# Patient Record
Sex: MALE | Race: BLACK OR AFRICAN AMERICAN | HISPANIC OR LATINO | Employment: OTHER | URBAN - METROPOLITAN AREA
[De-identification: names, ages, dates, MRNs, and addresses within clinical notes are randomized per-mention and may not be internally consistent; named-entity substitution may affect disease eponyms.]

---

## 2017-01-01 ENCOUNTER — GENERIC CONVERSION - ENCOUNTER (OUTPATIENT)
Dept: OTHER | Facility: OTHER | Age: 57
End: 2017-01-01

## 2017-01-25 ENCOUNTER — ALLSCRIPTS OFFICE VISIT (OUTPATIENT)
Dept: WOUND CARE | Facility: HOSPITAL | Age: 57
End: 2017-01-25
Payer: MEDICARE

## 2017-01-25 ENCOUNTER — GENERIC CONVERSION - ENCOUNTER (OUTPATIENT)
Dept: OTHER | Facility: OTHER | Age: 57
End: 2017-01-25

## 2017-01-25 PROCEDURE — 97598 DBRDMT OPN WND ADDL 20CM/<: CPT | Performed by: PREVENTIVE MEDICINE

## 2017-01-25 PROCEDURE — 97597 DBRDMT OPN WND 1ST 20 CM/<: CPT | Performed by: PREVENTIVE MEDICINE

## 2017-02-22 ENCOUNTER — ALLSCRIPTS OFFICE VISIT (OUTPATIENT)
Dept: WOUND CARE | Facility: HOSPITAL | Age: 57
End: 2017-02-22
Payer: MEDICARE

## 2017-02-22 PROCEDURE — 97598 DBRDMT OPN WND ADDL 20CM/<: CPT | Performed by: PREVENTIVE MEDICINE

## 2017-02-22 PROCEDURE — 97597 DBRDMT OPN WND 1ST 20 CM/<: CPT | Performed by: PREVENTIVE MEDICINE

## 2017-02-23 ENCOUNTER — ALLSCRIPTS OFFICE VISIT (OUTPATIENT)
Dept: OTHER | Facility: OTHER | Age: 57
End: 2017-02-23

## 2017-02-26 ENCOUNTER — GENERIC CONVERSION - ENCOUNTER (OUTPATIENT)
Dept: OTHER | Facility: OTHER | Age: 57
End: 2017-02-26

## 2017-03-22 ENCOUNTER — ALLSCRIPTS OFFICE VISIT (OUTPATIENT)
Dept: WOUND CARE | Facility: HOSPITAL | Age: 57
End: 2017-03-22
Payer: MEDICARE

## 2017-03-22 PROCEDURE — 99213 OFFICE O/P EST LOW 20 MIN: CPT | Performed by: PREVENTIVE MEDICINE

## 2017-03-22 PROCEDURE — 97598 DBRDMT OPN WND ADDL 20CM/<: CPT | Performed by: PREVENTIVE MEDICINE

## 2017-03-22 PROCEDURE — 97597 DBRDMT OPN WND 1ST 20 CM/<: CPT | Performed by: PREVENTIVE MEDICINE

## 2017-03-23 ENCOUNTER — APPOINTMENT (OUTPATIENT)
Dept: LAB | Facility: CLINIC | Age: 57
End: 2017-03-23
Payer: MEDICARE

## 2017-03-23 ENCOUNTER — TRANSCRIBE ORDERS (OUTPATIENT)
Dept: LAB | Facility: CLINIC | Age: 57
End: 2017-03-23

## 2017-03-23 ENCOUNTER — ALLSCRIPTS OFFICE VISIT (OUTPATIENT)
Dept: OTHER | Facility: OTHER | Age: 57
End: 2017-03-23

## 2017-03-23 DIAGNOSIS — E11.9 TYPE 2 DIABETES MELLITUS WITHOUT COMPLICATIONS (HCC): ICD-10-CM

## 2017-03-23 DIAGNOSIS — E78.5 HYPERLIPIDEMIA: ICD-10-CM

## 2017-03-23 LAB
25(OH)D3 SERPL-MCNC: 30 NG/ML (ref 30–100)
ALBUMIN SERPL BCP-MCNC: 2.6 G/DL (ref 3.5–5)
ALP SERPL-CCNC: 102 U/L (ref 46–116)
ALT SERPL W P-5'-P-CCNC: 13 U/L (ref 12–78)
ANION GAP SERPL CALCULATED.3IONS-SCNC: 6 MMOL/L (ref 4–13)
AST SERPL W P-5'-P-CCNC: 6 U/L (ref 5–45)
BASOPHILS # BLD AUTO: 0.06 THOUSANDS/ΜL (ref 0–0.1)
BASOPHILS NFR BLD AUTO: 1 % (ref 0–1)
BILIRUB SERPL-MCNC: 0.44 MG/DL (ref 0.2–1)
BUN SERPL-MCNC: 13 MG/DL (ref 5–25)
CALCIUM SERPL-MCNC: 9.1 MG/DL (ref 8.3–10.1)
CHLORIDE SERPL-SCNC: 107 MMOL/L (ref 100–108)
CHOLEST SERPL-MCNC: 147 MG/DL (ref 50–200)
CO2 SERPL-SCNC: 27 MMOL/L (ref 21–32)
CREAT SERPL-MCNC: 0.43 MG/DL (ref 0.6–1.3)
EOSINOPHIL # BLD AUTO: 0.35 THOUSAND/ΜL (ref 0–0.61)
EOSINOPHIL NFR BLD AUTO: 4 % (ref 0–6)
ERYTHROCYTE [DISTWIDTH] IN BLOOD BY AUTOMATED COUNT: 19 % (ref 11.6–15.1)
EST. AVERAGE GLUCOSE BLD GHB EST-MCNC: 103 MG/DL
GFR SERPL CREATININE-BSD FRML MDRD: >60 ML/MIN/1.73SQ M
GLUCOSE P FAST SERPL-MCNC: 74 MG/DL (ref 65–99)
HBA1C MFR BLD: 5.2 % (ref 4.2–6.3)
HCT VFR BLD AUTO: 37.8 % (ref 36.5–49.3)
HDLC SERPL-MCNC: 43 MG/DL (ref 40–60)
HGB BLD-MCNC: 11.8 G/DL (ref 12–17)
LDLC SERPL CALC-MCNC: 85 MG/DL (ref 0–100)
LYMPHOCYTES # BLD AUTO: 1.7 THOUSANDS/ΜL (ref 0.6–4.47)
LYMPHOCYTES NFR BLD AUTO: 20 % (ref 14–44)
MCH RBC QN AUTO: 25.8 PG (ref 26.8–34.3)
MCHC RBC AUTO-ENTMCNC: 31.2 G/DL (ref 31.4–37.4)
MCV RBC AUTO: 83 FL (ref 82–98)
MONOCYTES # BLD AUTO: 0.6 THOUSAND/ΜL (ref 0.17–1.22)
MONOCYTES NFR BLD AUTO: 7 % (ref 4–12)
NEUTROPHILS # BLD AUTO: 5.7 THOUSANDS/ΜL (ref 1.85–7.62)
NEUTS SEG NFR BLD AUTO: 68 % (ref 43–75)
NRBC BLD AUTO-RTO: 0 /100 WBCS
PLATELET # BLD AUTO: 413 THOUSANDS/UL (ref 149–390)
PMV BLD AUTO: 10.1 FL (ref 8.9–12.7)
POTASSIUM SERPL-SCNC: 3.8 MMOL/L (ref 3.5–5.3)
PROT SERPL-MCNC: 8.2 G/DL (ref 6.4–8.2)
RBC # BLD AUTO: 4.57 MILLION/UL (ref 3.88–5.62)
SODIUM SERPL-SCNC: 140 MMOL/L (ref 136–145)
TRIGL SERPL-MCNC: 96 MG/DL
TSH SERPL DL<=0.05 MIU/L-ACNC: 0.7 UIU/ML (ref 0.36–3.74)
WBC # BLD AUTO: 8.42 THOUSAND/UL (ref 4.31–10.16)

## 2017-03-23 PROCEDURE — 80061 LIPID PANEL: CPT

## 2017-03-23 PROCEDURE — 85025 COMPLETE CBC W/AUTO DIFF WBC: CPT

## 2017-03-23 PROCEDURE — 82306 VITAMIN D 25 HYDROXY: CPT

## 2017-03-23 PROCEDURE — 36415 COLL VENOUS BLD VENIPUNCTURE: CPT

## 2017-03-23 PROCEDURE — 83036 HEMOGLOBIN GLYCOSYLATED A1C: CPT

## 2017-03-23 PROCEDURE — 80053 COMPREHEN METABOLIC PANEL: CPT

## 2017-03-23 PROCEDURE — 84443 ASSAY THYROID STIM HORMONE: CPT

## 2017-03-24 ENCOUNTER — LAB REQUISITION (OUTPATIENT)
Dept: LAB | Facility: HOSPITAL | Age: 57
End: 2017-03-24
Payer: MEDICARE

## 2017-03-24 DIAGNOSIS — E78.5 HYPERLIPIDEMIA: ICD-10-CM

## 2017-03-24 LAB
CREAT UR-MCNC: 64.1 MG/DL
MICROALBUMIN UR-MCNC: 91.2 MG/L (ref 0–20)
MICROALBUMIN/CREAT 24H UR: 142 MG/G CREATININE (ref 0–30)

## 2017-03-24 PROCEDURE — 82043 UR ALBUMIN QUANTITATIVE: CPT | Performed by: FAMILY MEDICINE

## 2017-03-24 PROCEDURE — 82570 ASSAY OF URINE CREATININE: CPT | Performed by: FAMILY MEDICINE

## 2017-04-19 ENCOUNTER — ALLSCRIPTS OFFICE VISIT (OUTPATIENT)
Dept: OTHER | Facility: OTHER | Age: 57
End: 2017-04-19

## 2017-04-19 ENCOUNTER — ALLSCRIPTS OFFICE VISIT (OUTPATIENT)
Dept: WOUND CARE | Facility: HOSPITAL | Age: 57
End: 2017-04-19
Payer: MEDICARE

## 2017-04-19 PROCEDURE — 97598 DBRDMT OPN WND ADDL 20CM/<: CPT | Performed by: PREVENTIVE MEDICINE

## 2017-04-19 PROCEDURE — 97597 DBRDMT OPN WND 1ST 20 CM/<: CPT | Performed by: PREVENTIVE MEDICINE

## 2017-04-30 ENCOUNTER — ANESTHESIA EVENT (OUTPATIENT)
Dept: PERIOP | Facility: HOSPITAL | Age: 57
DRG: 463 | End: 2017-04-30
Payer: MEDICARE

## 2017-05-01 ENCOUNTER — ANESTHESIA (OUTPATIENT)
Dept: PERIOP | Facility: HOSPITAL | Age: 57
DRG: 463 | End: 2017-05-01
Payer: MEDICARE

## 2017-05-01 ENCOUNTER — HOSPITAL ENCOUNTER (INPATIENT)
Facility: HOSPITAL | Age: 57
LOS: 6 days | Discharge: HOME HEALTH | DRG: 463 | End: 2017-05-08
Attending: SURGERY | Admitting: SURGERY
Payer: MEDICARE

## 2017-05-01 DIAGNOSIS — L98.499 NON-PRESSURE CHRONIC ULCER OF SKIN OF OTHER SITES WITH UNSPECIFIED SEVERITY (HCC): ICD-10-CM

## 2017-05-01 LAB
BACTERIA UR QL AUTO: ABNORMAL /HPF
BILIRUB UR QL STRIP: NEGATIVE
CLARITY UR: ABNORMAL
COLOR UR: YELLOW
GLUCOSE SERPL-MCNC: 151 MG/DL (ref 65–140)
GLUCOSE SERPL-MCNC: 82 MG/DL (ref 65–140)
GLUCOSE SERPL-MCNC: 90 MG/DL (ref 65–140)
GLUCOSE UR STRIP-MCNC: NEGATIVE MG/DL
HGB UR QL STRIP.AUTO: ABNORMAL
KETONES UR STRIP-MCNC: NEGATIVE MG/DL
LEUKOCYTE ESTERASE UR QL STRIP: ABNORMAL
NITRITE UR QL STRIP: NEGATIVE
NON-SQ EPI CELLS URNS QL MICRO: ABNORMAL /HPF
PH UR STRIP.AUTO: 6 [PH] (ref 4.5–8)
PLATELET # BLD AUTO: 367 THOUSANDS/UL (ref 149–390)
PMV BLD AUTO: 9.9 FL (ref 8.9–12.7)
PROT UR STRIP-MCNC: ABNORMAL MG/DL
RBC #/AREA URNS AUTO: ABNORMAL /HPF
SP GR UR STRIP.AUTO: 1.02 (ref 1–1.03)
UROBILINOGEN UR QL STRIP.AUTO: 0.2 E.U./DL
WBC #/AREA URNS AUTO: ABNORMAL /HPF

## 2017-05-01 PROCEDURE — 85049 AUTOMATED PLATELET COUNT: CPT | Performed by: SURGERY

## 2017-05-01 PROCEDURE — 87086 URINE CULTURE/COLONY COUNT: CPT | Performed by: SURGERY

## 2017-05-01 PROCEDURE — 0JXM0ZC TRANSFER LEFT UPPER LEG SUBCUTANEOUS TISSUE AND FASCIA WITH SKIN, SUBCUTANEOUS TISSUE AND FASCIA, OPEN APPROACH: ICD-10-PCS | Performed by: SURGERY

## 2017-05-01 PROCEDURE — 82948 REAGENT STRIP/BLOOD GLUCOSE: CPT

## 2017-05-01 PROCEDURE — 0KBN0ZZ EXCISION OF RIGHT HIP MUSCLE, OPEN APPROACH: ICD-10-PCS | Performed by: SURGERY

## 2017-05-01 PROCEDURE — 88304 TISSUE EXAM BY PATHOLOGIST: CPT | Performed by: SURGERY

## 2017-05-01 PROCEDURE — 0JBM0ZZ EXCISION OF LEFT UPPER LEG SUBCUTANEOUS TISSUE AND FASCIA, OPEN APPROACH: ICD-10-PCS | Performed by: SURGERY

## 2017-05-01 PROCEDURE — 81001 URINALYSIS AUTO W/SCOPE: CPT | Performed by: SURGERY

## 2017-05-01 PROCEDURE — 0YW907Z REVISION OF AUTOLOGOUS TISSUE SUBSTITUTE IN RIGHT LOWER EXTREMITY, OPEN APPROACH: ICD-10-PCS | Performed by: SURGERY

## 2017-05-01 RX ORDER — MAGNESIUM HYDROXIDE 1200 MG/15ML
LIQUID ORAL AS NEEDED
Status: DISCONTINUED | OUTPATIENT
Start: 2017-05-01 | End: 2017-05-01 | Stop reason: HOSPADM

## 2017-05-01 RX ORDER — PROPOFOL 10 MG/ML
INJECTION, EMULSION INTRAVENOUS AS NEEDED
Status: DISCONTINUED | OUTPATIENT
Start: 2017-05-01 | End: 2017-05-01 | Stop reason: SURG

## 2017-05-01 RX ORDER — ALBUMIN, HUMAN INJ 5% 5 %
12.5 SOLUTION INTRAVENOUS ONCE
Status: COMPLETED | OUTPATIENT
Start: 2017-05-01 | End: 2017-05-01

## 2017-05-01 RX ORDER — SENNOSIDES 8.6 MG
1 TABLET ORAL DAILY
Status: DISCONTINUED | OUTPATIENT
Start: 2017-05-02 | End: 2017-05-08 | Stop reason: HOSPADM

## 2017-05-01 RX ORDER — ASPIRIN 81 MG/1
81 TABLET, CHEWABLE ORAL DAILY
Status: DISCONTINUED | OUTPATIENT
Start: 2017-05-02 | End: 2017-05-08 | Stop reason: HOSPADM

## 2017-05-01 RX ORDER — ONDANSETRON 2 MG/ML
4 INJECTION INTRAMUSCULAR; INTRAVENOUS EVERY 4 HOURS PRN
Status: DISCONTINUED | OUTPATIENT
Start: 2017-05-01 | End: 2017-05-08 | Stop reason: HOSPADM

## 2017-05-01 RX ORDER — ACETAMINOPHEN 325 MG/1
975 TABLET ORAL EVERY 6 HOURS SCHEDULED
Status: DISCONTINUED | OUTPATIENT
Start: 2017-05-01 | End: 2017-05-08 | Stop reason: HOSPADM

## 2017-05-01 RX ORDER — ROCURONIUM BROMIDE 10 MG/ML
INJECTION, SOLUTION INTRAVENOUS AS NEEDED
Status: DISCONTINUED | OUTPATIENT
Start: 2017-05-01 | End: 2017-05-01 | Stop reason: SURG

## 2017-05-01 RX ORDER — PANTOPRAZOLE SODIUM 20 MG/1
20 TABLET, DELAYED RELEASE ORAL
Status: DISCONTINUED | OUTPATIENT
Start: 2017-05-02 | End: 2017-05-08 | Stop reason: HOSPADM

## 2017-05-01 RX ORDER — MIDAZOLAM HYDROCHLORIDE 1 MG/ML
INJECTION INTRAMUSCULAR; INTRAVENOUS AS NEEDED
Status: DISCONTINUED | OUTPATIENT
Start: 2017-05-01 | End: 2017-05-01 | Stop reason: SURG

## 2017-05-01 RX ORDER — ONDANSETRON 2 MG/ML
4 INJECTION INTRAMUSCULAR; INTRAVENOUS ONCE AS NEEDED
Status: DISCONTINUED | OUTPATIENT
Start: 2017-05-01 | End: 2017-05-01 | Stop reason: HOSPADM

## 2017-05-01 RX ORDER — FENTANYL CITRATE 50 UG/ML
INJECTION, SOLUTION INTRAMUSCULAR; INTRAVENOUS AS NEEDED
Status: DISCONTINUED | OUTPATIENT
Start: 2017-05-01 | End: 2017-05-01 | Stop reason: SURG

## 2017-05-01 RX ORDER — OXYCODONE HYDROCHLORIDE 10 MG/1
10 TABLET ORAL EVERY 4 HOURS PRN
Status: DISCONTINUED | OUTPATIENT
Start: 2017-05-01 | End: 2017-05-08 | Stop reason: HOSPADM

## 2017-05-01 RX ORDER — SODIUM CHLORIDE 9 MG/ML
100 INJECTION, SOLUTION INTRAVENOUS CONTINUOUS
Status: DISCONTINUED | OUTPATIENT
Start: 2017-05-01 | End: 2017-05-02

## 2017-05-01 RX ORDER — FERROUS SULFATE 325(65) MG
325 TABLET ORAL 2 TIMES DAILY
Status: DISCONTINUED | OUTPATIENT
Start: 2017-05-01 | End: 2017-05-08 | Stop reason: HOSPADM

## 2017-05-01 RX ORDER — HEPARIN SODIUM 5000 [USP'U]/ML
5000 INJECTION, SOLUTION INTRAVENOUS; SUBCUTANEOUS EVERY 8 HOURS SCHEDULED
Status: DISCONTINUED | OUTPATIENT
Start: 2017-05-01 | End: 2017-05-08 | Stop reason: HOSPADM

## 2017-05-01 RX ORDER — DOCUSATE SODIUM 100 MG/1
100 CAPSULE, LIQUID FILLED ORAL 2 TIMES DAILY
Status: DISCONTINUED | OUTPATIENT
Start: 2017-05-01 | End: 2017-05-08 | Stop reason: HOSPADM

## 2017-05-01 RX ORDER — ONDANSETRON 2 MG/ML
INJECTION INTRAMUSCULAR; INTRAVENOUS AS NEEDED
Status: DISCONTINUED | OUTPATIENT
Start: 2017-05-01 | End: 2017-05-01 | Stop reason: SURG

## 2017-05-01 RX ORDER — SODIUM CHLORIDE, SODIUM LACTATE, POTASSIUM CHLORIDE, CALCIUM CHLORIDE 600; 310; 30; 20 MG/100ML; MG/100ML; MG/100ML; MG/100ML
100 INJECTION, SOLUTION INTRAVENOUS CONTINUOUS
Status: DISCONTINUED | OUTPATIENT
Start: 2017-05-01 | End: 2017-05-01

## 2017-05-01 RX ORDER — LIDOCAINE HYDROCHLORIDE 10 MG/ML
INJECTION, SOLUTION INFILTRATION; PERINEURAL AS NEEDED
Status: DISCONTINUED | OUTPATIENT
Start: 2017-05-01 | End: 2017-05-01 | Stop reason: SURG

## 2017-05-01 RX ORDER — OXYCODONE HYDROCHLORIDE 5 MG/1
5 TABLET ORAL
Status: DISCONTINUED | OUTPATIENT
Start: 2017-05-01 | End: 2017-05-08 | Stop reason: HOSPADM

## 2017-05-01 RX ORDER — OXYCODONE HYDROCHLORIDE 5 MG/1
5 TABLET ORAL EVERY 4 HOURS PRN
Status: DISCONTINUED | OUTPATIENT
Start: 2017-05-01 | End: 2017-05-08 | Stop reason: HOSPADM

## 2017-05-01 RX ORDER — FENTANYL CITRATE/PF 50 MCG/ML
25 SYRINGE (ML) INJECTION
Status: DISCONTINUED | OUTPATIENT
Start: 2017-05-01 | End: 2017-05-01 | Stop reason: HOSPADM

## 2017-05-01 RX ADMIN — ACETAMINOPHEN 975 MG: 325 TABLET, FILM COATED ORAL at 18:18

## 2017-05-01 RX ADMIN — HYDROMORPHONE HYDROCHLORIDE 0.5 MG: 1 INJECTION, SOLUTION INTRAMUSCULAR; INTRAVENOUS; SUBCUTANEOUS at 18:19

## 2017-05-01 RX ADMIN — LIDOCAINE HYDROCHLORIDE 50 MG: 10 INJECTION, SOLUTION INFILTRATION; PERINEURAL at 09:47

## 2017-05-01 RX ADMIN — OXYCODONE HYDROCHLORIDE 5 MG: 5 TABLET ORAL at 23:40

## 2017-05-01 RX ADMIN — ACETAMINOPHEN 975 MG: 325 TABLET, FILM COATED ORAL at 23:40

## 2017-05-01 RX ADMIN — METOPROLOL TARTRATE 1 MG: 5 INJECTION, SOLUTION INTRAVENOUS at 12:46

## 2017-05-01 RX ADMIN — FENTANYL CITRATE 50 MCG: 50 INJECTION, SOLUTION INTRAMUSCULAR; INTRAVENOUS at 14:04

## 2017-05-01 RX ADMIN — ALBUMIN HUMAN 12.5 G: 0.05 INJECTION, SOLUTION INTRAVENOUS at 15:45

## 2017-05-01 RX ADMIN — PROPOFOL 200 MG: 10 INJECTION, EMULSION INTRAVENOUS at 09:47

## 2017-05-01 RX ADMIN — OXYCODONE HYDROCHLORIDE 5 MG: 5 TABLET ORAL at 19:55

## 2017-05-01 RX ADMIN — CEFAZOLIN SODIUM 1000 MG: 1 SOLUTION INTRAVENOUS at 21:32

## 2017-05-01 RX ADMIN — FENTANYL CITRATE 50 MCG: 50 INJECTION, SOLUTION INTRAMUSCULAR; INTRAVENOUS at 12:51

## 2017-05-01 RX ADMIN — ONDANSETRON 4 MG: 2 INJECTION INTRAMUSCULAR; INTRAVENOUS at 14:31

## 2017-05-01 RX ADMIN — CEFAZOLIN SODIUM 1000 MG: 1 SOLUTION INTRAVENOUS at 10:12

## 2017-05-01 RX ADMIN — PHENYLEPHRINE HYDROCHLORIDE 50 MCG/MIN: 10 INJECTION INTRAVENOUS at 09:50

## 2017-05-01 RX ADMIN — SODIUM CHLORIDE, SODIUM LACTATE, POTASSIUM CHLORIDE, AND CALCIUM CHLORIDE: .6; .31; .03; .02 INJECTION, SOLUTION INTRAVENOUS at 12:41

## 2017-05-01 RX ADMIN — ROCURONIUM BROMIDE 30 MG: 10 INJECTION, SOLUTION INTRAVENOUS at 09:47

## 2017-05-01 RX ADMIN — FENTANYL CITRATE 50 MCG: 50 INJECTION, SOLUTION INTRAMUSCULAR; INTRAVENOUS at 12:14

## 2017-05-01 RX ADMIN — SODIUM CHLORIDE, SODIUM LACTATE, POTASSIUM CHLORIDE, AND CALCIUM CHLORIDE 100 ML/HR: .6; .31; .03; .02 INJECTION, SOLUTION INTRAVENOUS at 08:18

## 2017-05-01 RX ADMIN — FENTANYL CITRATE 50 MCG: 50 INJECTION, SOLUTION INTRAMUSCULAR; INTRAVENOUS at 11:40

## 2017-05-01 RX ADMIN — MIDAZOLAM HYDROCHLORIDE 2 MG: 1 INJECTION, SOLUTION INTRAMUSCULAR; INTRAVENOUS at 09:40

## 2017-05-01 RX ADMIN — SODIUM CHLORIDE 100 ML/HR: 0.9 INJECTION, SOLUTION INTRAVENOUS at 18:19

## 2017-05-01 RX ADMIN — FENTANYL CITRATE 50 MCG: 50 INJECTION, SOLUTION INTRAMUSCULAR; INTRAVENOUS at 09:43

## 2017-05-01 RX ADMIN — HEPARIN SODIUM 5000 UNITS: 5000 INJECTION, SOLUTION INTRAVENOUS; SUBCUTANEOUS at 21:31

## 2017-05-01 RX ADMIN — DOCUSATE SODIUM 100 MG: 100 CAPSULE, LIQUID FILLED ORAL at 18:19

## 2017-05-01 RX ADMIN — SODIUM CHLORIDE, SODIUM LACTATE, POTASSIUM CHLORIDE, AND CALCIUM CHLORIDE 100 ML/HR: .6; .31; .03; .02 INJECTION, SOLUTION INTRAVENOUS at 16:40

## 2017-05-01 RX ADMIN — FERROUS SULFATE TAB 325 MG (65 MG ELEMENTAL FE) 325 MG: 325 (65 FE) TAB at 18:19

## 2017-05-02 LAB
BACTERIA UR CULT: NORMAL
GLUCOSE SERPL-MCNC: 156 MG/DL (ref 65–140)
GLUCOSE SERPL-MCNC: 80 MG/DL (ref 65–140)
GLUCOSE SERPL-MCNC: 91 MG/DL (ref 65–140)
GLUCOSE SERPL-MCNC: 99 MG/DL (ref 65–140)

## 2017-05-02 PROCEDURE — 82948 REAGENT STRIP/BLOOD GLUCOSE: CPT

## 2017-05-02 RX ADMIN — HEPARIN SODIUM 5000 UNITS: 5000 INJECTION, SOLUTION INTRAVENOUS; SUBCUTANEOUS at 21:07

## 2017-05-02 RX ADMIN — OXYCODONE HYDROCHLORIDE 5 MG: 5 TABLET ORAL at 04:17

## 2017-05-02 RX ADMIN — ACETAMINOPHEN 975 MG: 325 TABLET, FILM COATED ORAL at 11:47

## 2017-05-02 RX ADMIN — OXYCODONE HYDROCHLORIDE 5 MG: 5 TABLET ORAL at 17:33

## 2017-05-02 RX ADMIN — PANTOPRAZOLE SODIUM 20 MG: 20 TABLET, DELAYED RELEASE ORAL at 05:10

## 2017-05-02 RX ADMIN — FERROUS SULFATE TAB 325 MG (65 MG ELEMENTAL FE) 325 MG: 325 (65 FE) TAB at 08:04

## 2017-05-02 RX ADMIN — SODIUM CHLORIDE 100 ML/HR: 0.9 INJECTION, SOLUTION INTRAVENOUS at 04:19

## 2017-05-02 RX ADMIN — SENNOSIDES 8.6 MG: 8.6 TABLET, FILM COATED ORAL at 08:04

## 2017-05-02 RX ADMIN — CEFAZOLIN SODIUM 1000 MG: 1 SOLUTION INTRAVENOUS at 10:48

## 2017-05-02 RX ADMIN — HEPARIN SODIUM 5000 UNITS: 5000 INJECTION, SOLUTION INTRAVENOUS; SUBCUTANEOUS at 14:00

## 2017-05-02 RX ADMIN — ACETAMINOPHEN 975 MG: 325 TABLET, FILM COATED ORAL at 05:09

## 2017-05-02 RX ADMIN — CEFAZOLIN SODIUM 1000 MG: 1 SOLUTION INTRAVENOUS at 21:07

## 2017-05-02 RX ADMIN — OXYCODONE HYDROCHLORIDE 5 MG: 5 TABLET ORAL at 11:47

## 2017-05-02 RX ADMIN — OXYCODONE HYDROCHLORIDE 5 MG: 5 TABLET ORAL at 20:58

## 2017-05-02 RX ADMIN — ASPIRIN 81 MG: 81 TABLET, CHEWABLE ORAL at 08:04

## 2017-05-02 RX ADMIN — DOCUSATE SODIUM 100 MG: 100 CAPSULE, LIQUID FILLED ORAL at 08:04

## 2017-05-02 RX ADMIN — FERROUS SULFATE TAB 325 MG (65 MG ELEMENTAL FE) 325 MG: 325 (65 FE) TAB at 17:33

## 2017-05-02 RX ADMIN — ACETAMINOPHEN 975 MG: 325 TABLET, FILM COATED ORAL at 17:32

## 2017-05-02 RX ADMIN — DOCUSATE SODIUM 100 MG: 100 CAPSULE, LIQUID FILLED ORAL at 17:33

## 2017-05-02 RX ADMIN — OXYCODONE HYDROCHLORIDE 5 MG: 5 TABLET ORAL at 08:04

## 2017-05-02 RX ADMIN — HEPARIN SODIUM 5000 UNITS: 5000 INJECTION, SOLUTION INTRAVENOUS; SUBCUTANEOUS at 05:10

## 2017-05-03 LAB
GLUCOSE SERPL-MCNC: 117 MG/DL (ref 65–140)
GLUCOSE SERPL-MCNC: 200 MG/DL (ref 65–140)
GLUCOSE SERPL-MCNC: 79 MG/DL (ref 65–140)
GLUCOSE SERPL-MCNC: 85 MG/DL (ref 65–140)

## 2017-05-03 PROCEDURE — 82948 REAGENT STRIP/BLOOD GLUCOSE: CPT

## 2017-05-03 RX ADMIN — FERROUS SULFATE TAB 325 MG (65 MG ELEMENTAL FE) 325 MG: 325 (65 FE) TAB at 17:42

## 2017-05-03 RX ADMIN — FERROUS SULFATE TAB 325 MG (65 MG ELEMENTAL FE) 325 MG: 325 (65 FE) TAB at 09:03

## 2017-05-03 RX ADMIN — OXYCODONE HYDROCHLORIDE 5 MG: 5 TABLET ORAL at 23:27

## 2017-05-03 RX ADMIN — OXYCODONE HYDROCHLORIDE 5 MG: 5 TABLET ORAL at 09:03

## 2017-05-03 RX ADMIN — ACETAMINOPHEN 975 MG: 325 TABLET, FILM COATED ORAL at 17:41

## 2017-05-03 RX ADMIN — HEPARIN SODIUM 5000 UNITS: 5000 INJECTION, SOLUTION INTRAVENOUS; SUBCUTANEOUS at 14:21

## 2017-05-03 RX ADMIN — ACETAMINOPHEN 975 MG: 325 TABLET, FILM COATED ORAL at 23:27

## 2017-05-03 RX ADMIN — HYDROMORPHONE HYDROCHLORIDE 0.5 MG: 1 INJECTION, SOLUTION INTRAMUSCULAR; INTRAVENOUS; SUBCUTANEOUS at 07:46

## 2017-05-03 RX ADMIN — OXYCODONE HYDROCHLORIDE 10 MG: 10 TABLET ORAL at 20:15

## 2017-05-03 RX ADMIN — HEPARIN SODIUM 5000 UNITS: 5000 INJECTION, SOLUTION INTRAVENOUS; SUBCUTANEOUS at 05:58

## 2017-05-03 RX ADMIN — HYDROMORPHONE HYDROCHLORIDE 0.5 MG: 1 INJECTION, SOLUTION INTRAMUSCULAR; INTRAVENOUS; SUBCUTANEOUS at 21:34

## 2017-05-03 RX ADMIN — OXYCODONE HYDROCHLORIDE 5 MG: 5 TABLET ORAL at 04:49

## 2017-05-03 RX ADMIN — SENNOSIDES 8.6 MG: 8.6 TABLET, FILM COATED ORAL at 09:02

## 2017-05-03 RX ADMIN — PANTOPRAZOLE SODIUM 20 MG: 20 TABLET, DELAYED RELEASE ORAL at 05:59

## 2017-05-03 RX ADMIN — OXYCODONE HYDROCHLORIDE 5 MG: 5 TABLET ORAL at 11:36

## 2017-05-03 RX ADMIN — ACETAMINOPHEN 975 MG: 325 TABLET, FILM COATED ORAL at 11:37

## 2017-05-03 RX ADMIN — ACETAMINOPHEN 975 MG: 325 TABLET, FILM COATED ORAL at 05:58

## 2017-05-03 RX ADMIN — DOCUSATE SODIUM 100 MG: 100 CAPSULE, LIQUID FILLED ORAL at 17:41

## 2017-05-03 RX ADMIN — ASPIRIN 81 MG: 81 TABLET, CHEWABLE ORAL at 09:02

## 2017-05-03 RX ADMIN — DOCUSATE SODIUM 100 MG: 100 CAPSULE, LIQUID FILLED ORAL at 09:02

## 2017-05-03 RX ADMIN — OXYCODONE HYDROCHLORIDE 5 MG: 5 TABLET ORAL at 17:42

## 2017-05-03 RX ADMIN — CEFAZOLIN SODIUM 1000 MG: 1 SOLUTION INTRAVENOUS at 10:14

## 2017-05-03 RX ADMIN — OXYCODONE HYDROCHLORIDE 5 MG: 5 TABLET ORAL at 20:08

## 2017-05-03 RX ADMIN — ACETAMINOPHEN 975 MG: 325 TABLET, FILM COATED ORAL at 00:34

## 2017-05-03 RX ADMIN — HEPARIN SODIUM 5000 UNITS: 5000 INJECTION, SOLUTION INTRAVENOUS; SUBCUTANEOUS at 21:34

## 2017-05-03 RX ADMIN — OXYCODONE HYDROCHLORIDE 5 MG: 5 TABLET ORAL at 00:33

## 2017-05-04 LAB
GLUCOSE SERPL-MCNC: 111 MG/DL (ref 65–140)
GLUCOSE SERPL-MCNC: 120 MG/DL (ref 65–140)
GLUCOSE SERPL-MCNC: 51 MG/DL (ref 65–140)
GLUCOSE SERPL-MCNC: 90 MG/DL (ref 65–140)

## 2017-05-04 PROCEDURE — 82948 REAGENT STRIP/BLOOD GLUCOSE: CPT

## 2017-05-04 RX ADMIN — HEPARIN SODIUM 5000 UNITS: 5000 INJECTION, SOLUTION INTRAVENOUS; SUBCUTANEOUS at 05:42

## 2017-05-04 RX ADMIN — PANTOPRAZOLE SODIUM 20 MG: 20 TABLET, DELAYED RELEASE ORAL at 05:43

## 2017-05-04 RX ADMIN — OXYCODONE HYDROCHLORIDE 5 MG: 5 TABLET ORAL at 23:47

## 2017-05-04 RX ADMIN — HYDROMORPHONE HYDROCHLORIDE 0.5 MG: 1 INJECTION, SOLUTION INTRAMUSCULAR; INTRAVENOUS; SUBCUTANEOUS at 12:11

## 2017-05-04 RX ADMIN — OXYCODONE HYDROCHLORIDE 5 MG: 5 TABLET ORAL at 15:31

## 2017-05-04 RX ADMIN — OXYCODONE HYDROCHLORIDE 5 MG: 5 TABLET ORAL at 12:07

## 2017-05-04 RX ADMIN — OXYCODONE HYDROCHLORIDE 5 MG: 5 TABLET ORAL at 05:42

## 2017-05-04 RX ADMIN — OXYCODONE HYDROCHLORIDE 5 MG: 5 TABLET ORAL at 20:04

## 2017-05-04 RX ADMIN — OXYCODONE HYDROCHLORIDE 5 MG: 5 TABLET ORAL at 07:39

## 2017-05-04 RX ADMIN — HEPARIN SODIUM 5000 UNITS: 5000 INJECTION, SOLUTION INTRAVENOUS; SUBCUTANEOUS at 15:31

## 2017-05-04 RX ADMIN — ACETAMINOPHEN 975 MG: 325 TABLET, FILM COATED ORAL at 12:07

## 2017-05-04 RX ADMIN — FERROUS SULFATE TAB 325 MG (65 MG ELEMENTAL FE) 325 MG: 325 (65 FE) TAB at 09:02

## 2017-05-04 RX ADMIN — OXYCODONE HYDROCHLORIDE 10 MG: 10 TABLET ORAL at 21:44

## 2017-05-04 RX ADMIN — FERROUS SULFATE TAB 325 MG (65 MG ELEMENTAL FE) 325 MG: 325 (65 FE) TAB at 17:42

## 2017-05-04 RX ADMIN — ACETAMINOPHEN 975 MG: 325 TABLET, FILM COATED ORAL at 17:42

## 2017-05-04 RX ADMIN — ACETAMINOPHEN 975 MG: 325 TABLET, FILM COATED ORAL at 23:47

## 2017-05-04 RX ADMIN — SENNOSIDES 8.6 MG: 8.6 TABLET, FILM COATED ORAL at 09:02

## 2017-05-04 RX ADMIN — DOCUSATE SODIUM 100 MG: 100 CAPSULE, LIQUID FILLED ORAL at 17:42

## 2017-05-04 RX ADMIN — HEPARIN SODIUM 5000 UNITS: 5000 INJECTION, SOLUTION INTRAVENOUS; SUBCUTANEOUS at 21:45

## 2017-05-04 RX ADMIN — DOCUSATE SODIUM 100 MG: 100 CAPSULE, LIQUID FILLED ORAL at 09:02

## 2017-05-04 RX ADMIN — ASPIRIN 81 MG: 81 TABLET, CHEWABLE ORAL at 09:02

## 2017-05-05 LAB
GLUCOSE SERPL-MCNC: 125 MG/DL (ref 65–140)
GLUCOSE SERPL-MCNC: 128 MG/DL (ref 65–140)
GLUCOSE SERPL-MCNC: 80 MG/DL (ref 65–140)
GLUCOSE SERPL-MCNC: 96 MG/DL (ref 65–140)

## 2017-05-05 PROCEDURE — 82948 REAGENT STRIP/BLOOD GLUCOSE: CPT

## 2017-05-05 RX ADMIN — HYDROMORPHONE HYDROCHLORIDE 0.5 MG: 1 INJECTION, SOLUTION INTRAMUSCULAR; INTRAVENOUS; SUBCUTANEOUS at 05:54

## 2017-05-05 RX ADMIN — OXYCODONE HYDROCHLORIDE 5 MG: 5 TABLET ORAL at 08:03

## 2017-05-05 RX ADMIN — HEPARIN SODIUM 5000 UNITS: 5000 INJECTION, SOLUTION INTRAVENOUS; SUBCUTANEOUS at 06:00

## 2017-05-05 RX ADMIN — OXYCODONE HYDROCHLORIDE 5 MG: 5 TABLET ORAL at 12:06

## 2017-05-05 RX ADMIN — FERROUS SULFATE TAB 325 MG (65 MG ELEMENTAL FE) 325 MG: 325 (65 FE) TAB at 08:04

## 2017-05-05 RX ADMIN — OXYCODONE HYDROCHLORIDE 5 MG: 5 TABLET ORAL at 16:06

## 2017-05-05 RX ADMIN — ACETAMINOPHEN 975 MG: 325 TABLET, FILM COATED ORAL at 05:52

## 2017-05-05 RX ADMIN — OXYCODONE HYDROCHLORIDE 5 MG: 5 TABLET ORAL at 04:14

## 2017-05-05 RX ADMIN — HEPARIN SODIUM 5000 UNITS: 5000 INJECTION, SOLUTION INTRAVENOUS; SUBCUTANEOUS at 16:06

## 2017-05-05 RX ADMIN — ACETAMINOPHEN 975 MG: 325 TABLET, FILM COATED ORAL at 17:44

## 2017-05-05 RX ADMIN — ACETAMINOPHEN 975 MG: 325 TABLET, FILM COATED ORAL at 12:12

## 2017-05-05 RX ADMIN — FERROUS SULFATE TAB 325 MG (65 MG ELEMENTAL FE) 325 MG: 325 (65 FE) TAB at 17:44

## 2017-05-05 RX ADMIN — DOCUSATE SODIUM 100 MG: 100 CAPSULE, LIQUID FILLED ORAL at 08:04

## 2017-05-05 RX ADMIN — HEPARIN SODIUM 5000 UNITS: 5000 INJECTION, SOLUTION INTRAVENOUS; SUBCUTANEOUS at 21:11

## 2017-05-05 RX ADMIN — ASPIRIN 81 MG: 81 TABLET, CHEWABLE ORAL at 08:03

## 2017-05-05 RX ADMIN — OXYCODONE HYDROCHLORIDE 5 MG: 5 TABLET ORAL at 21:11

## 2017-05-05 RX ADMIN — SENNOSIDES 8.6 MG: 8.6 TABLET, FILM COATED ORAL at 08:04

## 2017-05-05 RX ADMIN — PANTOPRAZOLE SODIUM 20 MG: 20 TABLET, DELAYED RELEASE ORAL at 05:53

## 2017-05-05 RX ADMIN — DOCUSATE SODIUM 100 MG: 100 CAPSULE, LIQUID FILLED ORAL at 17:45

## 2017-05-06 LAB
GLUCOSE SERPL-MCNC: 108 MG/DL (ref 65–140)
GLUCOSE SERPL-MCNC: 84 MG/DL (ref 65–140)
GLUCOSE SERPL-MCNC: 93 MG/DL (ref 65–140)
GLUCOSE SERPL-MCNC: 97 MG/DL (ref 65–140)

## 2017-05-06 PROCEDURE — 82948 REAGENT STRIP/BLOOD GLUCOSE: CPT

## 2017-05-06 RX ADMIN — OXYCODONE HYDROCHLORIDE 5 MG: 5 TABLET ORAL at 16:54

## 2017-05-06 RX ADMIN — ASPIRIN 81 MG: 81 TABLET, CHEWABLE ORAL at 08:08

## 2017-05-06 RX ADMIN — HEPARIN SODIUM 5000 UNITS: 5000 INJECTION, SOLUTION INTRAVENOUS; SUBCUTANEOUS at 21:29

## 2017-05-06 RX ADMIN — HYDROMORPHONE HYDROCHLORIDE 0.5 MG: 1 INJECTION, SOLUTION INTRAMUSCULAR; INTRAVENOUS; SUBCUTANEOUS at 09:19

## 2017-05-06 RX ADMIN — HEPARIN SODIUM 5000 UNITS: 5000 INJECTION, SOLUTION INTRAVENOUS; SUBCUTANEOUS at 15:09

## 2017-05-06 RX ADMIN — ACETAMINOPHEN 975 MG: 325 TABLET, FILM COATED ORAL at 01:24

## 2017-05-06 RX ADMIN — OXYCODONE HYDROCHLORIDE 5 MG: 5 TABLET ORAL at 05:43

## 2017-05-06 RX ADMIN — DOCUSATE SODIUM 100 MG: 100 CAPSULE, LIQUID FILLED ORAL at 16:54

## 2017-05-06 RX ADMIN — OXYCODONE HYDROCHLORIDE 5 MG: 5 TABLET ORAL at 21:29

## 2017-05-06 RX ADMIN — SENNOSIDES 8.6 MG: 8.6 TABLET, FILM COATED ORAL at 08:08

## 2017-05-06 RX ADMIN — DOCUSATE SODIUM 100 MG: 100 CAPSULE, LIQUID FILLED ORAL at 08:08

## 2017-05-06 RX ADMIN — OXYCODONE HYDROCHLORIDE 5 MG: 5 TABLET ORAL at 08:09

## 2017-05-06 RX ADMIN — OXYCODONE HYDROCHLORIDE 5 MG: 5 TABLET ORAL at 11:57

## 2017-05-06 RX ADMIN — HYDROMORPHONE HYDROCHLORIDE 0.5 MG: 1 INJECTION, SOLUTION INTRAMUSCULAR; INTRAVENOUS; SUBCUTANEOUS at 15:10

## 2017-05-06 RX ADMIN — OXYCODONE HYDROCHLORIDE 5 MG: 5 TABLET ORAL at 01:24

## 2017-05-06 RX ADMIN — PANTOPRAZOLE SODIUM 20 MG: 20 TABLET, DELAYED RELEASE ORAL at 05:43

## 2017-05-06 RX ADMIN — HEPARIN SODIUM 5000 UNITS: 5000 INJECTION, SOLUTION INTRAVENOUS; SUBCUTANEOUS at 05:43

## 2017-05-07 LAB
GLUCOSE SERPL-MCNC: 108 MG/DL (ref 65–140)
GLUCOSE SERPL-MCNC: 110 MG/DL (ref 65–140)
GLUCOSE SERPL-MCNC: 156 MG/DL (ref 65–140)
GLUCOSE SERPL-MCNC: 69 MG/DL (ref 65–140)

## 2017-05-07 PROCEDURE — 82948 REAGENT STRIP/BLOOD GLUCOSE: CPT

## 2017-05-07 RX ADMIN — DOCUSATE SODIUM 100 MG: 100 CAPSULE, LIQUID FILLED ORAL at 08:24

## 2017-05-07 RX ADMIN — OXYCODONE HYDROCHLORIDE 5 MG: 5 TABLET ORAL at 08:24

## 2017-05-07 RX ADMIN — OXYCODONE HYDROCHLORIDE 5 MG: 5 TABLET ORAL at 00:01

## 2017-05-07 RX ADMIN — ACETAMINOPHEN 975 MG: 325 TABLET, FILM COATED ORAL at 00:01

## 2017-05-07 RX ADMIN — ACETAMINOPHEN 975 MG: 325 TABLET, FILM COATED ORAL at 17:54

## 2017-05-07 RX ADMIN — HYDROMORPHONE HYDROCHLORIDE 0.5 MG: 1 INJECTION, SOLUTION INTRAMUSCULAR; INTRAVENOUS; SUBCUTANEOUS at 16:39

## 2017-05-07 RX ADMIN — ACETAMINOPHEN 975 MG: 325 TABLET, FILM COATED ORAL at 05:19

## 2017-05-07 RX ADMIN — OXYCODONE HYDROCHLORIDE 5 MG: 5 TABLET ORAL at 21:49

## 2017-05-07 RX ADMIN — PANTOPRAZOLE SODIUM 20 MG: 20 TABLET, DELAYED RELEASE ORAL at 05:20

## 2017-05-07 RX ADMIN — ASPIRIN 81 MG: 81 TABLET, CHEWABLE ORAL at 08:24

## 2017-05-07 RX ADMIN — OXYCODONE HYDROCHLORIDE 5 MG: 5 TABLET ORAL at 05:19

## 2017-05-07 RX ADMIN — HEPARIN SODIUM 5000 UNITS: 5000 INJECTION, SOLUTION INTRAVENOUS; SUBCUTANEOUS at 13:30

## 2017-05-07 RX ADMIN — OXYCODONE HYDROCHLORIDE 5 MG: 5 TABLET ORAL at 13:30

## 2017-05-07 RX ADMIN — DOCUSATE SODIUM 100 MG: 100 CAPSULE, LIQUID FILLED ORAL at 16:33

## 2017-05-07 RX ADMIN — OXYCODONE HYDROCHLORIDE 5 MG: 5 TABLET ORAL at 17:54

## 2017-05-07 RX ADMIN — HYDROMORPHONE HYDROCHLORIDE 0.5 MG: 1 INJECTION, SOLUTION INTRAMUSCULAR; INTRAVENOUS; SUBCUTANEOUS at 23:27

## 2017-05-07 RX ADMIN — SENNOSIDES 8.6 MG: 8.6 TABLET, FILM COATED ORAL at 08:24

## 2017-05-07 RX ADMIN — HEPARIN SODIUM 5000 UNITS: 5000 INJECTION, SOLUTION INTRAVENOUS; SUBCUTANEOUS at 05:20

## 2017-05-07 RX ADMIN — HYDROMORPHONE HYDROCHLORIDE 0.5 MG: 1 INJECTION, SOLUTION INTRAMUSCULAR; INTRAVENOUS; SUBCUTANEOUS at 11:53

## 2017-05-07 RX ADMIN — ACETAMINOPHEN 975 MG: 325 TABLET, FILM COATED ORAL at 13:30

## 2017-05-07 RX ADMIN — HEPARIN SODIUM 5000 UNITS: 5000 INJECTION, SOLUTION INTRAVENOUS; SUBCUTANEOUS at 21:49

## 2017-05-08 VITALS
WEIGHT: 169.97 LBS | RESPIRATION RATE: 18 BRPM | BODY MASS INDEX: 26.68 KG/M2 | DIASTOLIC BLOOD PRESSURE: 66 MMHG | TEMPERATURE: 98.4 F | OXYGEN SATURATION: 99 % | HEART RATE: 75 BPM | SYSTOLIC BLOOD PRESSURE: 101 MMHG | HEIGHT: 67 IN

## 2017-05-08 LAB
GLUCOSE SERPL-MCNC: 72 MG/DL (ref 65–140)
GLUCOSE SERPL-MCNC: 95 MG/DL (ref 65–140)
GLUCOSE SERPL-MCNC: 97 MG/DL (ref 65–140)

## 2017-05-08 PROCEDURE — 82948 REAGENT STRIP/BLOOD GLUCOSE: CPT

## 2017-05-08 RX ADMIN — HYDROMORPHONE HYDROCHLORIDE 0.5 MG: 1 INJECTION, SOLUTION INTRAMUSCULAR; INTRAVENOUS; SUBCUTANEOUS at 16:36

## 2017-05-08 RX ADMIN — SENNOSIDES 8.6 MG: 8.6 TABLET, FILM COATED ORAL at 08:41

## 2017-05-08 RX ADMIN — DOCUSATE SODIUM 100 MG: 100 CAPSULE, LIQUID FILLED ORAL at 08:41

## 2017-05-08 RX ADMIN — HEPARIN SODIUM 5000 UNITS: 5000 INJECTION, SOLUTION INTRAVENOUS; SUBCUTANEOUS at 13:08

## 2017-05-08 RX ADMIN — OXYCODONE HYDROCHLORIDE 10 MG: 10 TABLET ORAL at 12:12

## 2017-05-08 RX ADMIN — ASPIRIN 81 MG: 81 TABLET, CHEWABLE ORAL at 08:41

## 2017-05-08 RX ADMIN — ACETAMINOPHEN 975 MG: 325 TABLET, FILM COATED ORAL at 05:42

## 2017-05-08 RX ADMIN — HYDROMORPHONE HYDROCHLORIDE 0.5 MG: 1 INJECTION, SOLUTION INTRAMUSCULAR; INTRAVENOUS; SUBCUTANEOUS at 08:42

## 2017-05-08 RX ADMIN — OXYCODONE HYDROCHLORIDE 5 MG: 5 TABLET ORAL at 12:13

## 2017-05-08 RX ADMIN — OXYCODONE HYDROCHLORIDE 5 MG: 5 TABLET ORAL at 05:41

## 2017-05-08 RX ADMIN — OXYCODONE HYDROCHLORIDE 5 MG: 5 TABLET ORAL at 08:41

## 2017-05-08 RX ADMIN — PANTOPRAZOLE SODIUM 20 MG: 20 TABLET, DELAYED RELEASE ORAL at 05:43

## 2017-05-09 ENCOUNTER — GENERIC CONVERSION - ENCOUNTER (OUTPATIENT)
Dept: OTHER | Facility: OTHER | Age: 57
End: 2017-05-09

## 2017-05-23 ENCOUNTER — GENERIC CONVERSION - ENCOUNTER (OUTPATIENT)
Dept: OTHER | Facility: OTHER | Age: 57
End: 2017-05-23

## 2017-05-30 ENCOUNTER — GENERIC CONVERSION - ENCOUNTER (OUTPATIENT)
Dept: OTHER | Facility: OTHER | Age: 57
End: 2017-05-30

## 2017-06-06 ENCOUNTER — ALLSCRIPTS OFFICE VISIT (OUTPATIENT)
Dept: OTHER | Facility: OTHER | Age: 57
End: 2017-06-06

## 2017-06-07 ENCOUNTER — ALLSCRIPTS OFFICE VISIT (OUTPATIENT)
Dept: WOUND CARE | Facility: HOSPITAL | Age: 57
End: 2017-06-07
Payer: MEDICARE

## 2017-06-07 PROCEDURE — 97597 DBRDMT OPN WND 1ST 20 CM/<: CPT | Performed by: PREVENTIVE MEDICINE

## 2017-06-07 PROCEDURE — 97598 DBRDMT OPN WND ADDL 20CM/<: CPT | Performed by: PREVENTIVE MEDICINE

## 2017-06-20 ENCOUNTER — APPOINTMENT (OUTPATIENT)
Dept: LAB | Facility: HOSPITAL | Age: 57
End: 2017-06-20
Payer: MEDICARE

## 2017-06-20 DIAGNOSIS — R30.0 DYSURIA: ICD-10-CM

## 2017-06-20 LAB
BACTERIA UR QL AUTO: ABNORMAL /HPF
BILIRUB UR QL STRIP: NEGATIVE
CLARITY UR: ABNORMAL
COLOR UR: ABNORMAL
GLUCOSE UR STRIP-MCNC: NEGATIVE MG/DL
HGB UR QL STRIP.AUTO: ABNORMAL
HYALINE CASTS #/AREA URNS LPF: ABNORMAL /LPF
KETONES UR STRIP-MCNC: NEGATIVE MG/DL
LEUKOCYTE ESTERASE UR QL STRIP: ABNORMAL
NITRITE UR QL STRIP: NEGATIVE
NON-SQ EPI CELLS URNS QL MICRO: ABNORMAL /HPF
PH UR STRIP.AUTO: 6 [PH] (ref 4.5–8)
PROT UR STRIP-MCNC: ABNORMAL MG/DL
RBC #/AREA URNS AUTO: ABNORMAL /HPF
SP GR UR STRIP.AUTO: 1.02 (ref 1–1.03)
UROBILINOGEN UR QL STRIP.AUTO: 1 E.U./DL
WBC #/AREA URNS AUTO: ABNORMAL /HPF

## 2017-06-20 PROCEDURE — 87086 URINE CULTURE/COLONY COUNT: CPT

## 2017-06-20 PROCEDURE — 81001 URINALYSIS AUTO W/SCOPE: CPT

## 2017-06-21 ENCOUNTER — ALLSCRIPTS OFFICE VISIT (OUTPATIENT)
Dept: WOUND CARE | Facility: HOSPITAL | Age: 57
End: 2017-06-21
Payer: MEDICARE

## 2017-06-21 LAB — BACTERIA UR CULT: NORMAL

## 2017-06-28 ENCOUNTER — APPOINTMENT (OUTPATIENT)
Dept: WOUND CARE | Facility: HOSPITAL | Age: 57
End: 2017-06-28
Payer: MEDICARE

## 2017-06-28 PROCEDURE — 97597 DBRDMT OPN WND 1ST 20 CM/<: CPT | Performed by: PREVENTIVE MEDICINE

## 2017-06-28 PROCEDURE — 97605 NEG PRS WND THER DME<=50SQCM: CPT | Performed by: PREVENTIVE MEDICINE

## 2017-06-28 PROCEDURE — 97598 DBRDMT OPN WND ADDL 20CM/<: CPT | Performed by: PREVENTIVE MEDICINE

## 2017-07-19 ENCOUNTER — APPOINTMENT (OUTPATIENT)
Dept: WOUND CARE | Facility: HOSPITAL | Age: 57
End: 2017-07-19
Payer: MEDICARE

## 2017-07-19 PROCEDURE — 97597 DBRDMT OPN WND 1ST 20 CM/<: CPT | Performed by: PREVENTIVE MEDICINE

## 2017-07-19 PROCEDURE — 97598 DBRDMT OPN WND ADDL 20CM/<: CPT | Performed by: PREVENTIVE MEDICINE

## 2017-07-19 PROCEDURE — 97605 NEG PRS WND THER DME<=50SQCM: CPT | Performed by: PREVENTIVE MEDICINE

## 2017-07-28 ENCOUNTER — APPOINTMENT (OUTPATIENT)
Dept: LAB | Facility: CLINIC | Age: 57
End: 2017-07-28
Payer: MEDICARE

## 2017-07-28 ENCOUNTER — TRANSCRIBE ORDERS (OUTPATIENT)
Dept: LAB | Facility: CLINIC | Age: 57
End: 2017-07-28

## 2017-07-28 DIAGNOSIS — N39.0 URINARY TRACT INFECTION: ICD-10-CM

## 2017-07-28 DIAGNOSIS — N31.9 NEUROMUSCULAR DYSFUNCTION OF BLADDER: ICD-10-CM

## 2017-07-28 DIAGNOSIS — E11.9 TYPE 2 DIABETES MELLITUS WITHOUT COMPLICATIONS (HCC): ICD-10-CM

## 2017-07-28 DIAGNOSIS — D64.9 ANEMIA: ICD-10-CM

## 2017-07-28 DIAGNOSIS — I10 ESSENTIAL (PRIMARY) HYPERTENSION: ICD-10-CM

## 2017-07-28 DIAGNOSIS — E78.5 HYPERLIPIDEMIA: ICD-10-CM

## 2017-07-28 LAB
BACTERIA UR QL AUTO: ABNORMAL /HPF
BILIRUB UR QL STRIP: NEGATIVE
CLARITY UR: ABNORMAL
COLOR UR: YELLOW
GLUCOSE UR STRIP-MCNC: NEGATIVE MG/DL
HGB UR QL STRIP.AUTO: ABNORMAL
HYALINE CASTS #/AREA URNS LPF: ABNORMAL /LPF
KETONES UR STRIP-MCNC: NEGATIVE MG/DL
LEUKOCYTE ESTERASE UR QL STRIP: ABNORMAL
NITRITE UR QL STRIP: POSITIVE
NON-SQ EPI CELLS URNS QL MICRO: ABNORMAL /HPF
PH UR STRIP.AUTO: 6 [PH] (ref 4.5–8)
PROT UR STRIP-MCNC: ABNORMAL MG/DL
RBC #/AREA URNS AUTO: ABNORMAL /HPF
SP GR UR STRIP.AUTO: 1.01 (ref 1–1.03)
UROBILINOGEN UR QL STRIP.AUTO: 1 E.U./DL
WBC #/AREA URNS AUTO: ABNORMAL /HPF

## 2017-07-28 PROCEDURE — 87086 URINE CULTURE/COLONY COUNT: CPT

## 2017-07-28 PROCEDURE — 87186 SC STD MICRODIL/AGAR DIL: CPT

## 2017-07-28 PROCEDURE — 81001 URINALYSIS AUTO W/SCOPE: CPT

## 2017-07-28 PROCEDURE — 87077 CULTURE AEROBIC IDENTIFY: CPT

## 2017-07-30 LAB — BACTERIA UR CULT: NORMAL

## 2017-07-31 ENCOUNTER — GENERIC CONVERSION - ENCOUNTER (OUTPATIENT)
Dept: OTHER | Facility: OTHER | Age: 57
End: 2017-07-31

## 2017-08-02 ENCOUNTER — HOSPITAL ENCOUNTER (OUTPATIENT)
Dept: RADIOLOGY | Facility: HOSPITAL | Age: 57
Discharge: HOME | End: 2017-08-02
Attending: PREVENTIVE MEDICINE
Payer: MEDICARE

## 2017-08-02 ENCOUNTER — TRANSCRIBE ORDERS (OUTPATIENT)
Dept: ADMINISTRATIVE | Facility: HOSPITAL | Age: 57
End: 2017-08-02

## 2017-08-02 ENCOUNTER — APPOINTMENT (OUTPATIENT)
Dept: WOUND CARE | Facility: HOSPITAL | Age: 57
End: 2017-08-02
Payer: MEDICARE

## 2017-08-02 DIAGNOSIS — L89.893: Primary | ICD-10-CM

## 2017-08-02 DIAGNOSIS — L89.893: ICD-10-CM

## 2017-08-02 PROCEDURE — 73562 X-RAY EXAM OF KNEE 3: CPT

## 2017-08-02 PROCEDURE — 99214 OFFICE O/P EST MOD 30 MIN: CPT | Performed by: PREVENTIVE MEDICINE

## 2017-08-02 PROCEDURE — 97597 DBRDMT OPN WND 1ST 20 CM/<: CPT | Performed by: PREVENTIVE MEDICINE

## 2017-08-03 ENCOUNTER — GENERIC CONVERSION - ENCOUNTER (OUTPATIENT)
Dept: OTHER | Facility: OTHER | Age: 57
End: 2017-08-03

## 2017-08-03 ENCOUNTER — ALLSCRIPTS OFFICE VISIT (OUTPATIENT)
Dept: OTHER | Facility: OTHER | Age: 57
End: 2017-08-03

## 2017-08-09 ENCOUNTER — APPOINTMENT (OUTPATIENT)
Dept: LAB | Facility: HOSPITAL | Age: 57
End: 2017-08-09
Payer: MEDICARE

## 2017-08-09 ENCOUNTER — HOSPITAL ENCOUNTER (OUTPATIENT)
Dept: RADIOLOGY | Facility: HOSPITAL | Age: 57
Discharge: HOME | End: 2017-08-09
Attending: PREVENTIVE MEDICINE
Payer: MEDICARE

## 2017-08-09 ENCOUNTER — APPOINTMENT (OUTPATIENT)
Dept: LAB | Facility: HOSPITAL | Age: 57
End: 2017-08-09
Attending: PREVENTIVE MEDICINE
Payer: MEDICARE

## 2017-08-09 ENCOUNTER — APPOINTMENT (OUTPATIENT)
Dept: WOUND CARE | Facility: HOSPITAL | Age: 57
End: 2017-08-09
Payer: MEDICARE

## 2017-08-09 ENCOUNTER — TRANSCRIBE ORDERS (OUTPATIENT)
Dept: ADMINISTRATIVE | Facility: HOSPITAL | Age: 57
End: 2017-08-09

## 2017-08-09 DIAGNOSIS — I10 ESSENTIAL (PRIMARY) HYPERTENSION: ICD-10-CM

## 2017-08-09 DIAGNOSIS — L89.324 STAGE IV PRESSURE ULCER OF LEFT BUTTOCK (HCC): Primary | ICD-10-CM

## 2017-08-09 DIAGNOSIS — E11.8 TYPE 2 DIABETES MELLITUS WITH COMPLICATION, WITHOUT LONG-TERM CURRENT USE OF INSULIN (HCC): ICD-10-CM

## 2017-08-09 DIAGNOSIS — E78.5 HYPERLIPIDEMIA: ICD-10-CM

## 2017-08-09 DIAGNOSIS — L89.324 STAGE IV PRESSURE ULCER OF LEFT BUTTOCK (HCC): ICD-10-CM

## 2017-08-09 DIAGNOSIS — E11.9 TYPE 2 DIABETES MELLITUS WITHOUT COMPLICATIONS (HCC): ICD-10-CM

## 2017-08-09 DIAGNOSIS — D64.9 ANEMIA: ICD-10-CM

## 2017-08-09 LAB
25(OH)D3 SERPL-MCNC: 28.5 NG/ML (ref 30–100)
ALBUMIN SERPL BCP-MCNC: 2.9 G/DL (ref 3.5–5)
ALP SERPL-CCNC: 105 U/L (ref 46–116)
ALT SERPL W P-5'-P-CCNC: 17 U/L (ref 12–78)
ANION GAP SERPL CALCULATED.3IONS-SCNC: 9 MMOL/L (ref 4–13)
AST SERPL W P-5'-P-CCNC: 16 U/L (ref 5–45)
BASOPHILS # BLD AUTO: 0.08 THOUSANDS/ΜL (ref 0–0.1)
BASOPHILS NFR BLD AUTO: 1 % (ref 0–1)
BILIRUB SERPL-MCNC: 0.12 MG/DL (ref 0.2–1)
BUN SERPL-MCNC: 15 MG/DL (ref 5–25)
CALCIUM SERPL-MCNC: 9 MG/DL (ref 8.3–10.1)
CHLORIDE SERPL-SCNC: 102 MMOL/L (ref 100–108)
CHOLEST SERPL-MCNC: 145 MG/DL (ref 50–200)
CO2 SERPL-SCNC: 27 MMOL/L (ref 21–32)
CREAT SERPL-MCNC: 0.67 MG/DL (ref 0.6–1.3)
EOSINOPHIL # BLD AUTO: 0.25 THOUSAND/ΜL (ref 0–0.61)
EOSINOPHIL NFR BLD AUTO: 4 % (ref 0–6)
ERYTHROCYTE [DISTWIDTH] IN BLOOD BY AUTOMATED COUNT: 18.8 % (ref 11.6–15.1)
EST. AVERAGE GLUCOSE BLD GHB EST-MCNC: 111 MG/DL
GFR SERPL CREATININE-BSD FRML MDRD: 107 ML/MIN/1.73SQ M
GLUCOSE P FAST SERPL-MCNC: 70 MG/DL (ref 65–99)
HBA1C MFR BLD: 5.5 % (ref 4.2–6.3)
HCT VFR BLD AUTO: 39.5 % (ref 36.5–49.3)
HDLC SERPL-MCNC: 33 MG/DL (ref 40–60)
HGB BLD-MCNC: 12 G/DL (ref 12–17)
LDLC SERPL CALC-MCNC: 73 MG/DL (ref 0–100)
LYMPHOCYTES # BLD AUTO: 2.35 THOUSANDS/ΜL (ref 0.6–4.47)
LYMPHOCYTES NFR BLD AUTO: 42 % (ref 14–44)
MCH RBC QN AUTO: 24.9 PG (ref 26.8–34.3)
MCHC RBC AUTO-ENTMCNC: 30.4 G/DL (ref 31.4–37.4)
MCV RBC AUTO: 82 FL (ref 82–98)
MONOCYTES # BLD AUTO: 0.26 THOUSAND/ΜL (ref 0.17–1.22)
MONOCYTES NFR BLD AUTO: 5 % (ref 4–12)
NEUTROPHILS # BLD AUTO: 2.69 THOUSANDS/ΜL (ref 1.85–7.62)
NEUTS SEG NFR BLD AUTO: 48 % (ref 43–75)
PLATELET # BLD AUTO: 478 THOUSANDS/UL (ref 149–390)
PMV BLD AUTO: 9.7 FL (ref 8.9–12.7)
POTASSIUM SERPL-SCNC: 3.8 MMOL/L (ref 3.5–5.3)
PREALB SERPL-MCNC: 35.2 MG/DL (ref 18–40)
PROT SERPL-MCNC: 9.3 G/DL (ref 6.4–8.2)
RBC # BLD AUTO: 4.81 MILLION/UL (ref 3.88–5.62)
SODIUM SERPL-SCNC: 138 MMOL/L (ref 136–145)
TRIGL SERPL-MCNC: 197 MG/DL
TSH SERPL DL<=0.05 MIU/L-ACNC: 1.66 UIU/ML (ref 0.36–3.74)
WBC # BLD AUTO: 5.63 THOUSAND/UL (ref 4.31–10.16)

## 2017-08-09 PROCEDURE — 83036 HEMOGLOBIN GLYCOSYLATED A1C: CPT

## 2017-08-09 PROCEDURE — 84443 ASSAY THYROID STIM HORMONE: CPT

## 2017-08-09 PROCEDURE — 97597 DBRDMT OPN WND 1ST 20 CM/<: CPT | Performed by: PREVENTIVE MEDICINE

## 2017-08-09 PROCEDURE — 99213 OFFICE O/P EST LOW 20 MIN: CPT | Performed by: PREVENTIVE MEDICINE

## 2017-08-09 PROCEDURE — 11042 DBRDMT SUBQ TIS 1ST 20SQCM/<: CPT | Performed by: PREVENTIVE MEDICINE

## 2017-08-09 PROCEDURE — 80053 COMPREHEN METABOLIC PANEL: CPT

## 2017-08-09 PROCEDURE — 82306 VITAMIN D 25 HYDROXY: CPT

## 2017-08-09 PROCEDURE — 11045 DBRDMT SUBQ TISS EACH ADDL: CPT | Performed by: PREVENTIVE MEDICINE

## 2017-08-09 PROCEDURE — 72170 X-RAY EXAM OF PELVIS: CPT

## 2017-08-09 PROCEDURE — 36415 COLL VENOUS BLD VENIPUNCTURE: CPT

## 2017-08-09 PROCEDURE — 84134 ASSAY OF PREALBUMIN: CPT

## 2017-08-09 PROCEDURE — 80061 LIPID PANEL: CPT

## 2017-08-09 PROCEDURE — 85025 COMPLETE CBC W/AUTO DIFF WBC: CPT

## 2017-08-15 ENCOUNTER — LAB REQUISITION (OUTPATIENT)
Dept: LAB | Facility: HOSPITAL | Age: 57
End: 2017-08-15
Payer: MEDICARE

## 2017-08-15 DIAGNOSIS — E78.5 HYPERLIPIDEMIA: ICD-10-CM

## 2017-08-15 DIAGNOSIS — E11.9 TYPE 2 DIABETES MELLITUS WITHOUT COMPLICATIONS (HCC): ICD-10-CM

## 2017-08-15 DIAGNOSIS — Z93.3 COLOSTOMY STATUS (HCC): ICD-10-CM

## 2017-08-15 DIAGNOSIS — I10 ESSENTIAL (PRIMARY) HYPERTENSION: ICD-10-CM

## 2017-08-15 DIAGNOSIS — N31.9 NEUROMUSCULAR DYSFUNCTION OF BLADDER: ICD-10-CM

## 2017-08-15 DIAGNOSIS — D64.9 ANEMIA: ICD-10-CM

## 2017-08-15 LAB
CREAT UR-MCNC: 71.5 MG/DL
MICROALBUMIN UR-MCNC: 44.4 MG/L (ref 0–20)
MICROALBUMIN/CREAT 24H UR: 62 MG/G CREATININE (ref 0–30)

## 2017-08-15 PROCEDURE — 82043 UR ALBUMIN QUANTITATIVE: CPT | Performed by: FAMILY MEDICINE

## 2017-08-15 PROCEDURE — 82570 ASSAY OF URINE CREATININE: CPT | Performed by: FAMILY MEDICINE

## 2017-08-16 ENCOUNTER — APPOINTMENT (OUTPATIENT)
Dept: WOUND CARE | Facility: HOSPITAL | Age: 57
End: 2017-08-16
Payer: MEDICARE

## 2017-08-16 PROCEDURE — 11042 DBRDMT SUBQ TIS 1ST 20SQCM/<: CPT | Performed by: PREVENTIVE MEDICINE

## 2017-08-16 PROCEDURE — 11045 DBRDMT SUBQ TISS EACH ADDL: CPT | Performed by: PREVENTIVE MEDICINE

## 2017-08-16 PROCEDURE — 97597 DBRDMT OPN WND 1ST 20 CM/<: CPT | Performed by: PREVENTIVE MEDICINE

## 2017-08-23 ENCOUNTER — ALLSCRIPTS OFFICE VISIT (OUTPATIENT)
Dept: OTHER | Facility: OTHER | Age: 57
End: 2017-08-23

## 2017-09-06 ENCOUNTER — APPOINTMENT (OUTPATIENT)
Dept: WOUND CARE | Facility: HOSPITAL | Age: 57
End: 2017-09-06
Payer: MEDICARE

## 2017-09-06 PROCEDURE — 11042 DBRDMT SUBQ TIS 1ST 20SQCM/<: CPT | Performed by: PREVENTIVE MEDICINE

## 2017-09-06 PROCEDURE — 97597 DBRDMT OPN WND 1ST 20 CM/<: CPT | Performed by: PREVENTIVE MEDICINE

## 2017-09-20 ENCOUNTER — APPOINTMENT (OUTPATIENT)
Dept: WOUND CARE | Facility: HOSPITAL | Age: 57
End: 2017-09-20
Payer: MEDICARE

## 2017-09-20 ENCOUNTER — APPOINTMENT (OUTPATIENT)
Dept: LAB | Facility: HOSPITAL | Age: 57
End: 2017-09-20
Attending: SURGERY
Payer: MEDICARE

## 2017-09-20 ENCOUNTER — TRANSCRIBE ORDERS (OUTPATIENT)
Dept: ADMINISTRATIVE | Facility: HOSPITAL | Age: 57
End: 2017-09-20

## 2017-09-20 DIAGNOSIS — L98.499 ISCHEMIC ULCER, WITH UNSPECIFIED SEVERITY (HCC): ICD-10-CM

## 2017-09-20 DIAGNOSIS — L98.499 ISCHEMIC ULCER, WITH UNSPECIFIED SEVERITY (HCC): Primary | ICD-10-CM

## 2017-09-20 LAB
ANION GAP SERPL CALCULATED.3IONS-SCNC: 7 MMOL/L (ref 4–13)
BASOPHILS # BLD AUTO: 0.04 THOUSANDS/ΜL (ref 0–0.1)
BASOPHILS NFR BLD AUTO: 1 % (ref 0–1)
BUN SERPL-MCNC: 16 MG/DL (ref 5–25)
CALCIUM SERPL-MCNC: 9.4 MG/DL (ref 8.3–10.1)
CHLORIDE SERPL-SCNC: 103 MMOL/L (ref 100–108)
CO2 SERPL-SCNC: 28 MMOL/L (ref 21–32)
CREAT SERPL-MCNC: 0.48 MG/DL (ref 0.6–1.3)
EOSINOPHIL # BLD AUTO: 0.2 THOUSAND/ΜL (ref 0–0.61)
EOSINOPHIL NFR BLD AUTO: 3 % (ref 0–6)
ERYTHROCYTE [DISTWIDTH] IN BLOOD BY AUTOMATED COUNT: 17.8 % (ref 11.6–15.1)
GFR SERPL CREATININE-BSD FRML MDRD: 123 ML/MIN/1.73SQ M
GLUCOSE P FAST SERPL-MCNC: 80 MG/DL (ref 65–99)
HCT VFR BLD AUTO: 37.8 % (ref 36.5–49.3)
HGB BLD-MCNC: 11.9 G/DL (ref 12–17)
LYMPHOCYTES # BLD AUTO: 2.08 THOUSANDS/ΜL (ref 0.6–4.47)
LYMPHOCYTES NFR BLD AUTO: 27 % (ref 14–44)
MCH RBC QN AUTO: 26.3 PG (ref 26.8–34.3)
MCHC RBC AUTO-ENTMCNC: 31.5 G/DL (ref 31.4–37.4)
MCV RBC AUTO: 84 FL (ref 82–98)
MONOCYTES # BLD AUTO: 0.56 THOUSAND/ΜL (ref 0.17–1.22)
MONOCYTES NFR BLD AUTO: 7 % (ref 4–12)
NEUTROPHILS # BLD AUTO: 4.8 THOUSANDS/ΜL (ref 1.85–7.62)
NEUTS SEG NFR BLD AUTO: 62 % (ref 43–75)
NRBC BLD AUTO-RTO: 0 /100 WBCS
PLATELET # BLD AUTO: 373 THOUSANDS/UL (ref 149–390)
PMV BLD AUTO: 9.9 FL (ref 8.9–12.7)
POTASSIUM SERPL-SCNC: 3.6 MMOL/L (ref 3.5–5.3)
RBC # BLD AUTO: 4.52 MILLION/UL (ref 3.88–5.62)
SODIUM SERPL-SCNC: 138 MMOL/L (ref 136–145)
WBC # BLD AUTO: 7.68 THOUSAND/UL (ref 4.31–10.16)

## 2017-09-20 PROCEDURE — 80048 BASIC METABOLIC PNL TOTAL CA: CPT

## 2017-09-20 PROCEDURE — 36415 COLL VENOUS BLD VENIPUNCTURE: CPT

## 2017-09-20 PROCEDURE — 97605 NEG PRS WND THER DME<=50SQCM: CPT | Performed by: PREVENTIVE MEDICINE

## 2017-09-20 PROCEDURE — 97597 DBRDMT OPN WND 1ST 20 CM/<: CPT | Performed by: PREVENTIVE MEDICINE

## 2017-09-20 PROCEDURE — 11042 DBRDMT SUBQ TIS 1ST 20SQCM/<: CPT | Performed by: PREVENTIVE MEDICINE

## 2017-09-20 PROCEDURE — 85025 COMPLETE CBC W/AUTO DIFF WBC: CPT

## 2017-09-20 PROCEDURE — 11045 DBRDMT SUBQ TISS EACH ADDL: CPT | Performed by: PREVENTIVE MEDICINE

## 2017-09-21 ENCOUNTER — ANESTHESIA EVENT (OUTPATIENT)
Dept: PERIOP | Facility: HOSPITAL | Age: 57
DRG: 981 | End: 2017-09-21
Payer: MEDICARE

## 2017-09-26 NOTE — H&P
H&P Exam - General Surgery   Christopher Farooqcat 65 y o  male MRN: [id number]  Unit/Bed#:  Encounter: [id number]    Assessment/Plan     Assessment:  72-year-old male status post re-advancement of right anterior thigh flap and left medial wide advancement flap closure of left ischial wound  He had a partial dehiscence and necrosis of the flap of his left ischial  This was debrided in the office 2 weeks ago and reclosed  The wound has unfortunately dehisced again  It measures approximately 3 cm  This was dressed this with alginate  There are also some superficial wounds along the incision of the flap which I've also dressed with alginate  The right anterior thigh flap has healed well with the exception of the very distal aspect in the sacral region which has  Dr Magaña Rajesh had superficially debrided this and also dressed it with alginate  Unfortunately, the wounds were not able to be closed with wound care  Plan:  Advancement flap    History of Present Illness   HPI:  Tyler Brumfield is a 65 y o  male who presents status post re-advancement of right anterior thigh flap and left medial wide advancement flap closure of left ischial wound  He had a partial dehiscence and necrosis of the flap of his left ischial  This was debrided in the office 2 weeks ago and reclosed  The wound has unfortunately dehisced again  It measures approximately 3 cm  The wounds have not responded sufficiently with wound care,and he is now scheduled for advancement flap  Review of Systems   Constitutional: Negative for activity change  HENT: Negative for congestion  Respiratory: Negative for shortness of breath  Cardiovascular: Negative for chest pain  Skin: Positive for wound  Neurological: Positive for numbness  All other systems reviewed and are negative        Historical Information   Past Medical History:   Diagnosis Date    Anemia     Blind     Colostomy in place     Diabetes mellitus     GERD (gastroesophageal reflux disease)     History of diabetes mellitus     History of osteomyelitis     Hx of leg amputation     Hyperlipidemia     Neurogenic bladder     Paralysis     Spinal cord cysts     Ulcer of sacral region      Past Surgical History:   Procedure Laterality Date    AMPUTATION      BLADDER SURGERY      COLON SURGERY      COLOSTOMY      LEG AMPUTATION      DC ADJ TISS XFER SCALP,EXTREM 10 1-30 SQCM Left 5/1/2017    Procedure: POSTERIOR THIGH V-Y ADMANCEMENT;  Surgeon: Heather Mak MD;  Location: [facility];  Service: Plastics    DC MUSCLE-SKIN FLAP,TRUNK Left 5/1/2017    Procedure: FLAP CLOSURE LEFT ISCHIAL WOUND and "RIGHT" ISCHIAL FLAP ADVANCEMENT * DEBRIDEMENT, VAC PLACEMENT ;  Surgeon: Heather Mak MD;  Location: [facility];  Service: Plastics    SPINE SURGERY       Social History   History   Alcohol Use No     History   Drug Use No     History   Smoking Status    Former Smoker   Smokeless Tobacco    Never Used     Family History: non-contributory    Meds/Allergies   all medications and allergies reviewed  No Known Allergies    Objective   First Vitals:        Current Vitals:        No intake or output data in the 24 hours ending 09/26/17 1944    Invasive Devices     Drain            Colostomy Descending/sigmoid LLQ 97864 days    Closed/Suction Drain Right;Posterior Buttock Bulb 15 Fr  148 days    Negative Pressure Wound Therapy (V A C ) Other (Comment) Posterior; Lower 148 days                Physical Exam   Constitutional: He is oriented to person, place, and time  Neck: No JVD present  No tracheal deviation present  Cardiovascular: Regular rhythm  Pulmonary/Chest: No respiratory distress  Neurological: He is alert and oriented to person, place, and time  Skin:   Status post re-advancement of right anterior thigh flap and left medial wide advancement flap closure of left ischial wound   He had a partial dehiscence and necrosis of the flap of his left ischial  This was debrided in the office 2 weeks ago and reclosed  The wound has unfortunately dehisced again  Lab Results:   Lab Results   Component Value Date    WBC 7 68 09/20/2017    HGB 11 9 (L) 09/20/2017    HCT 37 8 09/20/2017     09/20/2017    CHOL 145 08/09/2017    TRIG 197 (H) 08/09/2017    HDL 33 (L) 08/09/2017    ALT 17 08/09/2017    AST 16 08/09/2017     09/20/2017    K 3 6 09/20/2017     09/20/2017    CREATININE 0 48 (L) 09/20/2017    BUN 16 09/20/2017    CO2 28 09/20/2017    INR 1 15 12/03/2016    GLUF 80 09/20/2017    HGBA1C 5 5 08/09/2017     Code Status: Full  Advance Directive and Living Will:      Power of :    POLST:      Counseling / Coordination of Care  Total floor / unit time spent today  minutes  Greater than 50% of total time was spent with the patient and / or family counseling and / or coordination of care  A description of the counseling / coordination of care: Meg Landa

## 2017-09-27 ENCOUNTER — ANESTHESIA (OUTPATIENT)
Dept: PERIOP | Facility: HOSPITAL | Age: 57
DRG: 981 | End: 2017-09-27
Payer: MEDICARE

## 2017-09-27 ENCOUNTER — HOSPITAL ENCOUNTER (INPATIENT)
Facility: HOSPITAL | Age: 57
LOS: 7 days | Discharge: HOME HEALTH | DRG: 981 | End: 2017-10-04
Attending: SURGERY | Admitting: SURGERY
Payer: MEDICARE

## 2017-09-27 DIAGNOSIS — L89.323: Primary | Chronic | ICD-10-CM

## 2017-09-27 DIAGNOSIS — S71.009A: ICD-10-CM

## 2017-09-27 DIAGNOSIS — L98.499 NON-PRESSURE CHRONIC ULCER OF SKIN OF OTHER SITES WITH UNSPECIFIED SEVERITY (HCC): ICD-10-CM

## 2017-09-27 DIAGNOSIS — S71.109A: ICD-10-CM

## 2017-09-27 LAB
ABO GROUP BLD: NORMAL
ATRIAL RATE: 103 BPM
BLD GP AB SCN SERPL QL: NEGATIVE
GLUCOSE SERPL-MCNC: 116 MG/DL (ref 65–140)
HCT VFR BLD AUTO: 34.5 % (ref 36.5–49.3)
HGB BLD-MCNC: 11 G/DL (ref 12–17)
P AXIS: 50 DEGREES
PR INTERVAL: 142 MS
QRS AXIS: 28 DEGREES
QRSD INTERVAL: 74 MS
QT INTERVAL: 330 MS
QTC INTERVAL: 432 MS
RH BLD: POSITIVE
SPECIMEN EXPIRATION DATE: NORMAL
T WAVE AXIS: 35 DEGREES
VENTRICULAR RATE: 103 BPM

## 2017-09-27 PROCEDURE — 0KXP0Z0 TRANSFER LEFT HIP MUSCLE WITH SKIN, OPEN APPROACH: ICD-10-PCS | Performed by: SURGERY

## 2017-09-27 PROCEDURE — 85014 HEMATOCRIT: CPT | Performed by: ANESTHESIOLOGY

## 2017-09-27 PROCEDURE — 82948 REAGENT STRIP/BLOOD GLUCOSE: CPT

## 2017-09-27 PROCEDURE — 86850 RBC ANTIBODY SCREEN: CPT | Performed by: ANESTHESIOLOGY

## 2017-09-27 PROCEDURE — 88304 TISSUE EXAM BY PATHOLOGIST: CPT | Performed by: SURGERY

## 2017-09-27 PROCEDURE — 93005 ELECTROCARDIOGRAM TRACING: CPT | Performed by: STUDENT IN AN ORGANIZED HEALTH CARE EDUCATION/TRAINING PROGRAM

## 2017-09-27 PROCEDURE — 86901 BLOOD TYPING SEROLOGIC RH(D): CPT | Performed by: ANESTHESIOLOGY

## 2017-09-27 PROCEDURE — 85018 HEMOGLOBIN: CPT | Performed by: ANESTHESIOLOGY

## 2017-09-27 PROCEDURE — 86900 BLOOD TYPING SEROLOGIC ABO: CPT | Performed by: ANESTHESIOLOGY

## 2017-09-27 RX ORDER — OXYCODONE HCL 20 MG/1
40 TABLET, FILM COATED, EXTENDED RELEASE ORAL EVERY 12 HOURS
Status: DISCONTINUED | OUTPATIENT
Start: 2017-09-27 | End: 2017-10-04 | Stop reason: HOSPADM

## 2017-09-27 RX ORDER — PROPOFOL 10 MG/ML
INJECTION, EMULSION INTRAVENOUS AS NEEDED
Status: DISCONTINUED | OUTPATIENT
Start: 2017-09-27 | End: 2017-09-27 | Stop reason: SURG

## 2017-09-27 RX ORDER — MEPERIDINE HYDROCHLORIDE 25 MG/ML
25 INJECTION INTRAMUSCULAR; INTRAVENOUS; SUBCUTANEOUS AS NEEDED
Status: DISCONTINUED | OUTPATIENT
Start: 2017-09-27 | End: 2017-09-27 | Stop reason: HOSPADM

## 2017-09-27 RX ORDER — ASPIRIN 81 MG/1
81 TABLET, CHEWABLE ORAL DAILY
Status: DISCONTINUED | OUTPATIENT
Start: 2017-09-28 | End: 2017-10-04 | Stop reason: HOSPADM

## 2017-09-27 RX ORDER — SODIUM CHLORIDE 9 MG/ML
75 INJECTION, SOLUTION INTRAVENOUS CONTINUOUS
Status: DISCONTINUED | OUTPATIENT
Start: 2017-09-27 | End: 2017-09-28

## 2017-09-27 RX ORDER — DOCUSATE SODIUM 100 MG/1
100 CAPSULE, LIQUID FILLED ORAL 2 TIMES DAILY
Status: DISCONTINUED | OUTPATIENT
Start: 2017-09-27 | End: 2017-10-04 | Stop reason: HOSPADM

## 2017-09-27 RX ORDER — ONDANSETRON 2 MG/ML
INJECTION INTRAMUSCULAR; INTRAVENOUS AS NEEDED
Status: DISCONTINUED | OUTPATIENT
Start: 2017-09-27 | End: 2017-09-27 | Stop reason: SURG

## 2017-09-27 RX ORDER — OXYCODONE HYDROCHLORIDE 10 MG/1
10 TABLET ORAL EVERY 4 HOURS PRN
Status: DISCONTINUED | OUTPATIENT
Start: 2017-09-27 | End: 2017-10-04 | Stop reason: HOSPADM

## 2017-09-27 RX ORDER — MAGNESIUM HYDROXIDE 1200 MG/15ML
LIQUID ORAL AS NEEDED
Status: DISCONTINUED | OUTPATIENT
Start: 2017-09-27 | End: 2017-09-27 | Stop reason: HOSPADM

## 2017-09-27 RX ORDER — SODIUM CHLORIDE, SODIUM LACTATE, POTASSIUM CHLORIDE, CALCIUM CHLORIDE 600; 310; 30; 20 MG/100ML; MG/100ML; MG/100ML; MG/100ML
125 INJECTION, SOLUTION INTRAVENOUS CONTINUOUS
Status: DISCONTINUED | OUTPATIENT
Start: 2017-09-27 | End: 2017-09-28

## 2017-09-27 RX ORDER — ACETAMINOPHEN 325 MG/1
975 TABLET ORAL EVERY 6 HOURS SCHEDULED
Status: DISCONTINUED | OUTPATIENT
Start: 2017-09-27 | End: 2017-10-02

## 2017-09-27 RX ORDER — MIDAZOLAM HYDROCHLORIDE 1 MG/ML
INJECTION INTRAMUSCULAR; INTRAVENOUS AS NEEDED
Status: DISCONTINUED | OUTPATIENT
Start: 2017-09-27 | End: 2017-09-27 | Stop reason: SURG

## 2017-09-27 RX ORDER — ONDANSETRON 2 MG/ML
4 INJECTION INTRAMUSCULAR; INTRAVENOUS EVERY 6 HOURS PRN
Status: DISCONTINUED | OUTPATIENT
Start: 2017-09-27 | End: 2017-10-04 | Stop reason: HOSPADM

## 2017-09-27 RX ORDER — GLYCOPYRROLATE 0.2 MG/ML
INJECTION INTRAMUSCULAR; INTRAVENOUS AS NEEDED
Status: DISCONTINUED | OUTPATIENT
Start: 2017-09-27 | End: 2017-09-27 | Stop reason: SURG

## 2017-09-27 RX ORDER — CEFAZOLIN SODIUM 1 G/3ML
INJECTION, POWDER, FOR SOLUTION INTRAMUSCULAR; INTRAVENOUS AS NEEDED
Status: DISCONTINUED | OUTPATIENT
Start: 2017-09-27 | End: 2017-09-27 | Stop reason: SURG

## 2017-09-27 RX ORDER — OXYCODONE HYDROCHLORIDE 5 MG/1
5 TABLET ORAL EVERY 4 HOURS PRN
Status: DISCONTINUED | OUTPATIENT
Start: 2017-09-27 | End: 2017-10-04 | Stop reason: HOSPADM

## 2017-09-27 RX ORDER — FENTANYL CITRATE 50 UG/ML
INJECTION, SOLUTION INTRAMUSCULAR; INTRAVENOUS AS NEEDED
Status: DISCONTINUED | OUTPATIENT
Start: 2017-09-27 | End: 2017-09-27 | Stop reason: SURG

## 2017-09-27 RX ORDER — ROCURONIUM BROMIDE 10 MG/ML
INJECTION, SOLUTION INTRAVENOUS AS NEEDED
Status: DISCONTINUED | OUTPATIENT
Start: 2017-09-27 | End: 2017-09-27 | Stop reason: SURG

## 2017-09-27 RX ORDER — SENNOSIDES 8.6 MG
1 TABLET ORAL DAILY
Status: DISCONTINUED | OUTPATIENT
Start: 2017-09-28 | End: 2017-10-04 | Stop reason: HOSPADM

## 2017-09-27 RX ORDER — GINSENG 100 MG
CAPSULE ORAL AS NEEDED
Status: DISCONTINUED | OUTPATIENT
Start: 2017-09-27 | End: 2017-09-27 | Stop reason: HOSPADM

## 2017-09-27 RX ADMIN — SODIUM CHLORIDE, SODIUM LACTATE, POTASSIUM CHLORIDE, AND CALCIUM CHLORIDE 1000 ML: .6; .31; .03; .02 INJECTION, SOLUTION INTRAVENOUS at 14:46

## 2017-09-27 RX ADMIN — SODIUM CHLORIDE 75 ML/HR: 0.9 INJECTION, SOLUTION INTRAVENOUS at 12:57

## 2017-09-27 RX ADMIN — ROCURONIUM BROMIDE 60 MG: 10 INJECTION, SOLUTION INTRAVENOUS at 08:39

## 2017-09-27 RX ADMIN — OXYCODONE HYDROCHLORIDE 10 MG: 10 TABLET ORAL at 23:44

## 2017-09-27 RX ADMIN — GLYCOPYRROLATE 0.4 MG: 0.2 INJECTION, SOLUTION INTRAMUSCULAR; INTRAVENOUS at 12:20

## 2017-09-27 RX ADMIN — FENTANYL CITRATE 50 MCG: 50 INJECTION, SOLUTION INTRAMUSCULAR; INTRAVENOUS at 09:07

## 2017-09-27 RX ADMIN — DEXAMETHASONE SODIUM PHOSPHATE 10 MG: 10 INJECTION, SOLUTION INTRAMUSCULAR; INTRAVENOUS at 09:09

## 2017-09-27 RX ADMIN — NEOSTIGMINE METHYLSULFATE 2 MG: 1 INJECTION, SOLUTION INTRAMUSCULAR; INTRAVENOUS; SUBCUTANEOUS at 12:20

## 2017-09-27 RX ADMIN — ACETAMINOPHEN 975 MG: 325 TABLET, FILM COATED ORAL at 17:59

## 2017-09-27 RX ADMIN — HYDROMORPHONE HYDROCHLORIDE 0.5 MG: 1 INJECTION, SOLUTION INTRAMUSCULAR; INTRAVENOUS; SUBCUTANEOUS at 21:02

## 2017-09-27 RX ADMIN — HYDROMORPHONE HYDROCHLORIDE 0.5 MG: 1 INJECTION, SOLUTION INTRAMUSCULAR; INTRAVENOUS; SUBCUTANEOUS at 11:59

## 2017-09-27 RX ADMIN — ONDANSETRON 4 MG: 2 INJECTION INTRAMUSCULAR; INTRAVENOUS at 11:25

## 2017-09-27 RX ADMIN — FENTANYL CITRATE 50 MCG: 50 INJECTION, SOLUTION INTRAMUSCULAR; INTRAVENOUS at 10:29

## 2017-09-27 RX ADMIN — OXYCODONE HYDROCHLORIDE 40 MG: 20 TABLET, FILM COATED, EXTENDED RELEASE ORAL at 18:07

## 2017-09-27 RX ADMIN — CEFAZOLIN SODIUM 1000 MG: 1 SOLUTION INTRAVENOUS at 18:00

## 2017-09-27 RX ADMIN — ACETAMINOPHEN 975 MG: 325 TABLET, FILM COATED ORAL at 23:44

## 2017-09-27 RX ADMIN — MIDAZOLAM HYDROCHLORIDE 2 MG: 1 INJECTION, SOLUTION INTRAMUSCULAR; INTRAVENOUS at 08:27

## 2017-09-27 RX ADMIN — DOCUSATE SODIUM 100 MG: 100 CAPSULE, LIQUID FILLED ORAL at 18:00

## 2017-09-27 RX ADMIN — OXYCODONE HYDROCHLORIDE 10 MG: 10 TABLET ORAL at 17:59

## 2017-09-27 RX ADMIN — SODIUM CHLORIDE 75 ML/HR: 0.9 INJECTION, SOLUTION INTRAVENOUS at 16:50

## 2017-09-27 RX ADMIN — ROCURONIUM BROMIDE 20 MG: 10 INJECTION, SOLUTION INTRAVENOUS at 10:29

## 2017-09-27 RX ADMIN — SODIUM CHLORIDE, SODIUM LACTATE, POTASSIUM CHLORIDE, AND CALCIUM CHLORIDE: .6; .31; .03; .02 INJECTION, SOLUTION INTRAVENOUS at 11:03

## 2017-09-27 RX ADMIN — PROPOFOL 150 MG: 10 INJECTION, EMULSION INTRAVENOUS at 08:38

## 2017-09-27 RX ADMIN — LIDOCAINE HYDROCHLORIDE 3 ML: 20 INJECTION, SOLUTION INTRAVENOUS at 08:37

## 2017-09-27 RX ADMIN — CEFAZOLIN SODIUM 2000 MG: 1 INJECTION, POWDER, FOR SOLUTION INTRAMUSCULAR; INTRAVENOUS at 09:05

## 2017-09-27 RX ADMIN — SODIUM CHLORIDE, SODIUM LACTATE, POTASSIUM CHLORIDE, AND CALCIUM CHLORIDE: .6; .31; .03; .02 INJECTION, SOLUTION INTRAVENOUS at 08:14
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
## 2017-09-28 LAB
ANION GAP SERPL CALCULATED.3IONS-SCNC: 7 MMOL/L (ref 4–13)
BASOPHILS # BLD AUTO: 0.03 THOUSANDS/ΜL (ref 0–0.1)
BASOPHILS NFR BLD AUTO: 0 % (ref 0–1)
BUN SERPL-MCNC: 11 MG/DL (ref 5–25)
CALCIUM SERPL-MCNC: 8.3 MG/DL (ref 8.3–10.1)
CHLORIDE SERPL-SCNC: 109 MMOL/L (ref 100–108)
CO2 SERPL-SCNC: 25 MMOL/L (ref 21–32)
CREAT SERPL-MCNC: 0.42 MG/DL (ref 0.6–1.3)
EOSINOPHIL # BLD AUTO: 0.03 THOUSAND/ΜL (ref 0–0.61)
EOSINOPHIL NFR BLD AUTO: 0 % (ref 0–6)
ERYTHROCYTE [DISTWIDTH] IN BLOOD BY AUTOMATED COUNT: 18.2 % (ref 11.6–15.1)
GFR SERPL CREATININE-BSD FRML MDRD: 130 ML/MIN/1.73SQ M
GLUCOSE SERPL-MCNC: 85 MG/DL (ref 65–140)
HCT VFR BLD AUTO: 33.6 % (ref 36.5–49.3)
HGB BLD-MCNC: 10.6 G/DL (ref 12–17)
LYMPHOCYTES # BLD AUTO: 2.76 THOUSANDS/ΜL (ref 0.6–4.47)
LYMPHOCYTES NFR BLD AUTO: 26 % (ref 14–44)
MCH RBC QN AUTO: 26.2 PG (ref 26.8–34.3)
MCHC RBC AUTO-ENTMCNC: 31.5 G/DL (ref 31.4–37.4)
MCV RBC AUTO: 83 FL (ref 82–98)
MONOCYTES # BLD AUTO: 0.99 THOUSAND/ΜL (ref 0.17–1.22)
MONOCYTES NFR BLD AUTO: 9 % (ref 4–12)
NEUTROPHILS # BLD AUTO: 6.93 THOUSANDS/ΜL (ref 1.85–7.62)
NEUTS SEG NFR BLD AUTO: 65 % (ref 43–75)
NRBC BLD AUTO-RTO: 0 /100 WBCS
PLATELET # BLD AUTO: 510 THOUSANDS/UL (ref 149–390)
PMV BLD AUTO: 9.9 FL (ref 8.9–12.7)
POTASSIUM SERPL-SCNC: 3.4 MMOL/L (ref 3.5–5.3)
RBC # BLD AUTO: 4.04 MILLION/UL (ref 3.88–5.62)
SODIUM SERPL-SCNC: 141 MMOL/L (ref 136–145)
WBC # BLD AUTO: 10.77 THOUSAND/UL (ref 4.31–10.16)

## 2017-09-28 PROCEDURE — 85025 COMPLETE CBC W/AUTO DIFF WBC: CPT | Performed by: SURGERY

## 2017-09-28 PROCEDURE — 80048 BASIC METABOLIC PNL TOTAL CA: CPT | Performed by: SURGERY

## 2017-09-28 RX ORDER — POTASSIUM CHLORIDE 14.9 MG/ML
20 INJECTION INTRAVENOUS ONCE
Status: COMPLETED | OUTPATIENT
Start: 2017-09-28 | End: 2017-09-28

## 2017-09-28 RX ADMIN — ACETAMINOPHEN 975 MG: 325 TABLET, FILM COATED ORAL at 23:45

## 2017-09-28 RX ADMIN — CEFAZOLIN SODIUM 1000 MG: 1 SOLUTION INTRAVENOUS at 01:08

## 2017-09-28 RX ADMIN — ACETAMINOPHEN 975 MG: 325 TABLET, FILM COATED ORAL at 11:18

## 2017-09-28 RX ADMIN — ACETAMINOPHEN 975 MG: 325 TABLET, FILM COATED ORAL at 06:13

## 2017-09-28 RX ADMIN — HYDROMORPHONE HYDROCHLORIDE 0.5 MG: 1 INJECTION, SOLUTION INTRAMUSCULAR; INTRAVENOUS; SUBCUTANEOUS at 08:09

## 2017-09-28 RX ADMIN — ASPIRIN 81 MG 81 MG: 81 TABLET ORAL at 08:09

## 2017-09-28 RX ADMIN — ENOXAPARIN SODIUM 40 MG: 40 INJECTION SUBCUTANEOUS at 08:09

## 2017-09-28 RX ADMIN — POTASSIUM CHLORIDE 20 MEQ: 200 INJECTION, SOLUTION INTRAVENOUS at 09:55

## 2017-09-28 RX ADMIN — DOCUSATE SODIUM 100 MG: 100 CAPSULE, LIQUID FILLED ORAL at 08:09

## 2017-09-28 RX ADMIN — HYDROMORPHONE HYDROCHLORIDE 0.5 MG: 1 INJECTION, SOLUTION INTRAMUSCULAR; INTRAVENOUS; SUBCUTANEOUS at 13:16

## 2017-09-28 RX ADMIN — ACETAMINOPHEN 975 MG: 325 TABLET, FILM COATED ORAL at 17:53

## 2017-09-28 RX ADMIN — CEFAZOLIN SODIUM 1000 MG: 1 SOLUTION INTRAVENOUS at 17:53

## 2017-09-28 RX ADMIN — CEFAZOLIN SODIUM 1000 MG: 1 SOLUTION INTRAVENOUS at 08:09

## 2017-09-28 RX ADMIN — OXYCODONE HYDROCHLORIDE 10 MG: 10 TABLET ORAL at 15:44

## 2017-09-28 RX ADMIN — OXYCODONE HYDROCHLORIDE 40 MG: 20 TABLET, FILM COATED, EXTENDED RELEASE ORAL at 17:54

## 2017-09-28 RX ADMIN — DOCUSATE SODIUM 100 MG: 100 CAPSULE, LIQUID FILLED ORAL at 17:54

## 2017-09-28 RX ADMIN — SODIUM CHLORIDE 75 ML/HR: 0.9 INJECTION, SOLUTION INTRAVENOUS at 06:43

## 2017-09-28 RX ADMIN — HYDROMORPHONE HYDROCHLORIDE 0.5 MG: 1 INJECTION, SOLUTION INTRAMUSCULAR; INTRAVENOUS; SUBCUTANEOUS at 22:00

## 2017-09-28 RX ADMIN — HYDROMORPHONE HYDROCHLORIDE 0.5 MG: 1 INJECTION, SOLUTION INTRAMUSCULAR; INTRAVENOUS; SUBCUTANEOUS at 01:07

## 2017-09-28 RX ADMIN — OXYCODONE HYDROCHLORIDE 40 MG: 20 TABLET, FILM COATED, EXTENDED RELEASE ORAL at 06:13

## 2017-09-28 RX ADMIN — OXYCODONE HYDROCHLORIDE 10 MG: 10 TABLET ORAL at 20:48

## 2017-09-28 RX ADMIN — SENNOSIDES 8.6 MG: 8.6 TABLET, FILM COATED ORAL at 08:09
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
## 2017-09-28 NOTE — CASE MANAGEMENT
Initial Clinical Review    Age/Sex: 65 y o  male admitted on 9/27 for elective surgery - OR    Surgery Date: 9/27    Procedure: S/P gluteal myocutaneous rotational flap, posterior thigh v to y advancement- wound 5 x 2 5 x 8 (Left Chest)    Anesthesia: General    Admission Orders: Date/Time/Statement: Inpatient 9/27/17 @ 200 Med Surg     Orders Placed This Encounter   Procedures    Inpatient Admission     Standing Status:   Standing     Number of Occurrences:   1     Order Specific Question:   Admitting Physician     Answer:   Calvin Fletcher [[id number]]     Order Specific Question:   Level of Care     Answer:   Med Surg [16]     Order Specific Question:   Estimated length of stay     Answer:   More than 2 Midnights     Order Specific Question:   Certification     Answer:   I certify that inpatient services are medically necessary for this patient for a duration of greater than two midnights  See H&P and MD Progress Notes for additional information about the patient's course of treatment  Vital Signs: /72   Pulse 87   Temp 98 8 °F (37 1 °C) (Oral)   Resp 18   Ht 5' 7" (1 702 m)   Wt 78 5 kg (173 lb)   SpO2 95%   BMI 27 10 kg/m²     Diet:        Diet Orders            Start     Ordered    09/27/17 1311  Dietary nutrition supplements  Once     Question Answer Comment   Select Supplement: Brave Bolus    Select Supplement: Glucerna-Chocolate    Frequency Breakfast, Lunch, Dinner, HS        09/27/17 1310    09/27/17 1309  Diet Zeus/CHO Controlled;  Cons Carb 3: 22-2400Kcals  Diet effective now     Question Answer Comment   Diet Type Zeus/CHO Controlled    Zeus/CHO Controlled Cons Carb 3: 22-2400Kcals        09/27/17 1309        Mobility: Bed rest    DVT Prophylaxis: Sequential compression device    Scheduled Meds:  acetaminophen 975 mg Oral Q6H WALTER   aspirin 81 mg Oral Daily   cefazolin 1,000 mg Intravenous Q8H   docusate sodium 100 mg Oral BID   enoxaparin 40 mg Subcutaneous Daily   oxyCODONE 40 mg Oral Q12H potassium chloride 20 mEq Intravenous Once   senna 1 tablet Oral Daily     Continuous Infusions:    sodium chloride 0 9 % infusion  Rate: 75 mL/hr Freq: Continuous Route: IV    PRN Meds:  HYDROmorphone Iv x3    ondansetron    oxyCODONE po x2

## 2017-09-28 NOTE — PROGRESS NOTES
Asked patient about removing dangelo catheter  Pt refused, states he does not want it taken out at this time  Will try again later

## 2017-09-28 NOTE — PROGRESS NOTES
Progress Note - General Surgery   Kike Arguello 65 y o  male MRN: [id number]  Unit/Bed#: [facility] 908-01 Encounter: [id number]    Assessment:  65 M with left ischial wound and sacral wound who underwent ischial wound rotation flap advancement and sacral wound closure on 9/27  Plan:  Continue diet  DC IVF  Dressings will be changed POD 3  FU labs  Paincontrol PRN  FU Drain output  SCDs, Lovenox for DVT Ppx    Subjective/Objective     Subjective: Patient has no complaints and pain well controlled  No N/V/fevers/sweats/chills  He has passed gas and has output in colostomy  Objective:    Blood pressure 117/72, pulse 87, temperature 98 8 °F (37 1 °C), temperature source Oral, resp  rate 18, height 5' 7" (1 702 m), weight 78 5 kg (173 lb), SpO2 95 %  ,Body mass index is 27 1 kg/m²  Intake/Output Summary (Last 24 hours) at 09/28/17 0934  Last data filed at 09/28/17 0843   Gross per 24 hour   Intake             3530 ml   Output             1315 ml   Net             2215 ml       Invasive Devices     Peripheral Intravenous Line            Peripheral IV 09/27/17 Left Arm 1 day          Drain            Colostomy Descending/sigmoid LLQ 54189 days    Negative Pressure Wound Therapy (V A C ) Other (Comment) Posterior; Lower 149 days    Urethral Catheter Latex 16 Fr  1 day    Closed/Suction Drain Left;Posterior; Other (Comment) Buttock Bulb 15 Fr  less than 1 day                Physical Exam:   General: NAD  Heart: No audible murmurs  Lungs: No audible wheezing  Abdomen: Soft, NTND  Ext: Dressings in place with no drainage seen      Results from last 7 days  Lab Units 09/28/17  0457 09/27/17  1151   WBC Thousand/uL 10 77*  --    HEMOGLOBIN g/dL 10 6* 11 0*   HEMATOCRIT % 33 6* 34 5*   PLATELETS Thousands/uL 510*  --        Results from last 7 days  Lab Units 09/28/17  0457   SODIUM mmol/L 141   POTASSIUM mmol/L 3 4*   CHLORIDE mmol/L 109*   CO2 mmol/L 25   BUN mg/dL 11   CREATININE mg/dL 0 42*   GLUCOSE RANDOM mg/dL 85 CALCIUM mg/dL 8 3

## 2017-09-28 NOTE — PROGRESS NOTES
Post Op Note    S: Doing well, pain controlled, tolerating PO intake    O:   Vitals:    09/27/17 1910   BP: 117/60   Pulse: 60   Resp: 18   Temp: 99 °F (37 2 °C)   SpO2: 95%     I/O       09/26 0701 - 09/27 0700 09/27 0701 - 09/28 0700    P  O   480    I V  (mL/kg)  2000 (25 5)    Total Intake(mL/kg)  2480 (31 6)    Urine (mL/kg/hr)  300 (0 2)    Drains  30 (0)    Stool  0 (0)    Blood  500 (0 4)    Total Output   830    Net   +1650               NAD  CV RRR, HDS  Pulm CTAB, normal effort  Abdomen soft, NTND  Wound dressings c/d/i    A/P  65 y o  male s/p gluteal myocutaneous rotational flap, posterior thigh V to Y advancement    Initially hypotensive post op, this improved w/ fluids, patient is now stable    Plan for dressings down POD3  PRN pain control  PT/OT  Pulm torosey Ascencio MD

## 2017-09-29 LAB
BASOPHILS # BLD AUTO: 0.05 THOUSANDS/ΜL (ref 0–0.1)
BASOPHILS NFR BLD AUTO: 1 % (ref 0–1)
EOSINOPHIL # BLD AUTO: 0.29 THOUSAND/ΜL (ref 0–0.61)
EOSINOPHIL NFR BLD AUTO: 4 % (ref 0–6)
ERYTHROCYTE [DISTWIDTH] IN BLOOD BY AUTOMATED COUNT: 18.5 % (ref 11.6–15.1)
HCT VFR BLD AUTO: 30.8 % (ref 36.5–49.3)
HGB BLD-MCNC: 9.7 G/DL (ref 12–17)
LYMPHOCYTES # BLD AUTO: 2.35 THOUSANDS/ΜL (ref 0.6–4.47)
LYMPHOCYTES NFR BLD AUTO: 29 % (ref 14–44)
MCH RBC QN AUTO: 26.3 PG (ref 26.8–34.3)
MCHC RBC AUTO-ENTMCNC: 31.5 G/DL (ref 31.4–37.4)
MCV RBC AUTO: 84 FL (ref 82–98)
MONOCYTES # BLD AUTO: 0.51 THOUSAND/ΜL (ref 0.17–1.22)
MONOCYTES NFR BLD AUTO: 6 % (ref 4–12)
NEUTROPHILS # BLD AUTO: 4.81 THOUSANDS/ΜL (ref 1.85–7.62)
NEUTS SEG NFR BLD AUTO: 60 % (ref 43–75)
NRBC BLD AUTO-RTO: 0 /100 WBCS
PLATELET # BLD AUTO: 363 THOUSANDS/UL (ref 149–390)
PMV BLD AUTO: 10.1 FL (ref 8.9–12.7)
RBC # BLD AUTO: 3.69 MILLION/UL (ref 3.88–5.62)
WBC # BLD AUTO: 8.02 THOUSAND/UL (ref 4.31–10.16)

## 2017-09-29 PROCEDURE — 85025 COMPLETE CBC W/AUTO DIFF WBC: CPT | Performed by: SURGERY

## 2017-09-29 RX ADMIN — OXYCODONE HYDROCHLORIDE 10 MG: 10 TABLET ORAL at 01:36

## 2017-09-29 RX ADMIN — SENNOSIDES 8.6 MG: 8.6 TABLET, FILM COATED ORAL at 08:40

## 2017-09-29 RX ADMIN — CEFAZOLIN SODIUM 1000 MG: 1 SOLUTION INTRAVENOUS at 08:44

## 2017-09-29 RX ADMIN — ACETAMINOPHEN 975 MG: 325 TABLET, FILM COATED ORAL at 17:54

## 2017-09-29 RX ADMIN — OXYCODONE HYDROCHLORIDE 10 MG: 10 TABLET ORAL at 05:42

## 2017-09-29 RX ADMIN — OXYCODONE HYDROCHLORIDE 10 MG: 10 TABLET ORAL at 16:23

## 2017-09-29 RX ADMIN — CEFAZOLIN SODIUM 1000 MG: 1 SOLUTION INTRAVENOUS at 17:55

## 2017-09-29 RX ADMIN — HYDROMORPHONE HYDROCHLORIDE 0.5 MG: 1 INJECTION, SOLUTION INTRAMUSCULAR; INTRAVENOUS; SUBCUTANEOUS at 07:47

## 2017-09-29 RX ADMIN — ACETAMINOPHEN 975 MG: 325 TABLET, FILM COATED ORAL at 05:41

## 2017-09-29 RX ADMIN — DOCUSATE SODIUM 100 MG: 100 CAPSULE, LIQUID FILLED ORAL at 08:40

## 2017-09-29 RX ADMIN — HYDROMORPHONE HYDROCHLORIDE 0.5 MG: 1 INJECTION, SOLUTION INTRAMUSCULAR; INTRAVENOUS; SUBCUTANEOUS at 18:07

## 2017-09-29 RX ADMIN — ACETAMINOPHEN 975 MG: 325 TABLET, FILM COATED ORAL at 11:45

## 2017-09-29 RX ADMIN — CEFAZOLIN SODIUM 1000 MG: 1 SOLUTION INTRAVENOUS at 01:38

## 2017-09-29 RX ADMIN — ENOXAPARIN SODIUM 40 MG: 40 INJECTION SUBCUTANEOUS at 08:40

## 2017-09-29 RX ADMIN — OXYCODONE HYDROCHLORIDE 10 MG: 10 TABLET ORAL at 22:41

## 2017-09-29 RX ADMIN — ASPIRIN 81 MG 81 MG: 81 TABLET ORAL at 08:40

## 2017-09-29 RX ADMIN — OXYCODONE HYDROCHLORIDE 40 MG: 20 TABLET, FILM COATED, EXTENDED RELEASE ORAL at 17:54

## 2017-09-29 RX ADMIN — OXYCODONE HYDROCHLORIDE 40 MG: 20 TABLET, FILM COATED, EXTENDED RELEASE ORAL at 05:40

## 2017-09-29 RX ADMIN — DOCUSATE SODIUM 100 MG: 100 CAPSULE, LIQUID FILLED ORAL at 17:54

## 2017-09-29 NOTE — PROGRESS NOTES
Notified Roberto Rodriguez MD about patient's low blood pressures  He said to continue to monitor blood pressures and new new orders at this time  Will continue to monitor patient

## 2017-09-29 NOTE — PLAN OF CARE

## 2017-09-29 NOTE — PROGRESS NOTES
Progress Note - General Surgery   Allen Isaac Moscat 65 y o  male MRN: [id number]  Unit/Bed#: [facility] 908-01 Encounter: [id number]    Assessment:  65 M with left ischial wound and sacral wound who underwent ischial wound rotation flap advancement and sacral wound closure on 9/27  Plan:  Dressings will be changed POD 3 (9/30)  Daily dressing changes my nurses with maxorb to knee  Continue diet  FU labs  Pain control PRN  FU Drain output  SCDs, Lovenox for DVT Ppx    Subjective/Objective     Subjective: Patient has no complaints and pain well controlled  No acute events overnight  No N/V/fevers/sweats/chills  He has passed gas and has output in colostomy  Objective:    Blood pressure 91/59, pulse 97, temperature 98 6 °F (37 °C), temperature source Oral, resp  rate 19, height 5' 7" (1 702 m), weight 78 5 kg (173 lb), SpO2 98 %  ,Body mass index is 27 1 kg/m²  Intake/Output Summary (Last 24 hours) at 09/29/17 0611  Last data filed at 09/29/17 0545   Gross per 24 hour   Intake          2531 25 ml   Output             1770 ml   Net           761 25 ml       Invasive Devices     Peripheral Intravenous Line            Peripheral IV 09/27/17 Left Arm 1 day          Drain            Colostomy Descending/sigmoid LLQ 51773 days    Negative Pressure Wound Therapy (V A C ) Other (Comment) Posterior; Lower 150 days    Closed/Suction Drain Left;Posterior; Other (Comment) Buttock Bulb 15 Fr  1 day                Physical Exam:   General: NAD  Heart: No audible murmurs  Lungs: No audible wheezing  Abdomen: Soft, NTND, Colostomy site CDI  Ext: Dressings in place with no drainage seen      Results from last 7 days  Lab Units 09/28/17  0457 09/27/17  1151   WBC Thousand/uL 10 77*  --    HEMOGLOBIN g/dL 10 6* 11 0*   HEMATOCRIT % 33 6* 34 5*   PLATELETS Thousands/uL 510*  --        Results from last 7 days  Lab Units 09/28/17  0457   SODIUM mmol/L 141   POTASSIUM mmol/L 3 4*   CHLORIDE mmol/L 109*   CO2 mmol/L 25   BUN mg/dL 11 CREATININE mg/dL 0 42*   GLUCOSE RANDOM mg/dL 85   CALCIUM mg/dL 8 3

## 2017-09-30 RX ADMIN — OXYCODONE HYDROCHLORIDE 40 MG: 20 TABLET, FILM COATED, EXTENDED RELEASE ORAL at 19:12

## 2017-09-30 RX ADMIN — CEFAZOLIN SODIUM 1000 MG: 1 SOLUTION INTRAVENOUS at 19:13

## 2017-09-30 RX ADMIN — ACETAMINOPHEN 975 MG: 325 TABLET, FILM COATED ORAL at 00:57

## 2017-09-30 RX ADMIN — ASPIRIN 81 MG 81 MG: 81 TABLET ORAL at 08:54

## 2017-09-30 RX ADMIN — SENNOSIDES 8.6 MG: 8.6 TABLET, FILM COATED ORAL at 08:54

## 2017-09-30 RX ADMIN — OXYCODONE HYDROCHLORIDE 10 MG: 10 TABLET ORAL at 13:58

## 2017-09-30 RX ADMIN — HYDROMORPHONE HYDROCHLORIDE 0.5 MG: 1 INJECTION, SOLUTION INTRAMUSCULAR; INTRAVENOUS; SUBCUTANEOUS at 08:54

## 2017-09-30 RX ADMIN — CEFAZOLIN SODIUM 1000 MG: 1 SOLUTION INTRAVENOUS at 01:03

## 2017-09-30 RX ADMIN — HYDROMORPHONE HYDROCHLORIDE 0.5 MG: 1 INJECTION, SOLUTION INTRAMUSCULAR; INTRAVENOUS; SUBCUTANEOUS at 16:48

## 2017-09-30 RX ADMIN — HYDROMORPHONE HYDROCHLORIDE 0.5 MG: 1 INJECTION, SOLUTION INTRAMUSCULAR; INTRAVENOUS; SUBCUTANEOUS at 00:56

## 2017-09-30 RX ADMIN — HYDROMORPHONE HYDROCHLORIDE 0.5 MG: 1 INJECTION, SOLUTION INTRAMUSCULAR; INTRAVENOUS; SUBCUTANEOUS at 21:59

## 2017-09-30 RX ADMIN — OXYCODONE HYDROCHLORIDE 40 MG: 20 TABLET, FILM COATED, EXTENDED RELEASE ORAL at 05:21

## 2017-09-30 RX ADMIN — ACETAMINOPHEN 975 MG: 325 TABLET, FILM COATED ORAL at 11:12

## 2017-09-30 RX ADMIN — OXYCODONE HYDROCHLORIDE 10 MG: 10 TABLET ORAL at 05:22

## 2017-09-30 RX ADMIN — ENOXAPARIN SODIUM 40 MG: 40 INJECTION SUBCUTANEOUS at 08:55

## 2017-09-30 RX ADMIN — CEFAZOLIN SODIUM 1000 MG: 1 SOLUTION INTRAVENOUS at 08:55

## 2017-09-30 RX ADMIN — ACETAMINOPHEN 975 MG: 325 TABLET, FILM COATED ORAL at 19:14

## 2017-09-30 RX ADMIN — ACETAMINOPHEN 975 MG: 325 TABLET, FILM COATED ORAL at 05:21

## 2017-09-30 RX ADMIN — DOCUSATE SODIUM 100 MG: 100 CAPSULE, LIQUID FILLED ORAL at 08:54

## 2017-09-30 RX ADMIN — DOCUSATE SODIUM 100 MG: 100 CAPSULE, LIQUID FILLED ORAL at 19:11

## 2017-09-30 NOTE — PROGRESS NOTES
Progress Note - General Surgery   Jesus Morales Moscat 65 y o  male MRN: [id number]  [facility] Encounter: [id number]    Assessment:  65 M with left ischial wound and sacral wound who underwent ischial wound rotation flap advancement and sacral wound closure on 9/27  Plan:  Dressing change today  Daily dressing changes my nurses with maxorb to knee  Diet as tolerated  FU Drain output  SCDs, Lovenox for DVT Ppx    Subjective/Objective     Subjective: Denies complaint    Objective:    Blood pressure 130/79, pulse 63, temperature 98 7 °F (37 1 °C), temperature source Oral, resp  rate 16, height 5' 7" (1 702 m), weight 78 5 kg (173 lb), SpO2 98 %  ,Body mass index is 27 1 kg/m²  Intake/Output Summary (Last 24 hours) at 09/30/17 0659  Last data filed at 09/30/17 2428   Gross per 24 hour   Intake             1470 ml   Output             1480 ml   Net              -10 ml       Invasive Devices     Peripheral Intravenous Line            Peripheral IV 09/27/17 Left Arm 2 days          Drain            Colostomy Descending/sigmoid LLQ 67598 days    Negative Pressure Wound Therapy (V A C ) Other (Comment) Posterior; Lower 151 days    Closed/Suction Drain Left;Posterior; Other (Comment) Buttock Bulb 15 Fr  2 days                Physical Exam:   General: NAD  Heart: No audible murmurs  Lungs: No audible wheezing  Abdomen: Soft, NTND, Colostomy site CDI  Ext: Dressings in place with no drainage seen      Results from last 7 days  Lab Units 09/29/17  0447 09/28/17  0457 09/27/17  1151   WBC Thousand/uL 8 02 10 77*  --    HEMOGLOBIN g/dL 9 7* 10 6* 11 0*   HEMATOCRIT % 30 8* 33 6* 34 5*   PLATELETS Thousands/uL 363 510*  --        Results from last 7 days  Lab Units 09/28/17  0457   SODIUM mmol/L 141   POTASSIUM mmol/L 3 4*   CHLORIDE mmol/L 109*   CO2 mmol/L 25   BUN mg/dL 11   CREATININE mg/dL 0 42*   GLUCOSE RANDOM mg/dL 85   CALCIUM mg/dL 8 3

## 2017-09-30 NOTE — PLAN OF CARE

## 2017-09-30 NOTE — PROGRESS NOTES
Colostomy bag emptyed by nurse assistant  Stool hard  Cleaned and rinsed by nurse  Reapplied  Dressing around reinforced    New set up ordered from [address]

## 2017-10-01 RX ORDER — GINSENG 100 MG
1 CAPSULE ORAL ONCE
Status: COMPLETED | OUTPATIENT
Start: 2017-10-01 | End: 2017-10-01

## 2017-10-01 RX ADMIN — ACETAMINOPHEN 975 MG: 325 TABLET, FILM COATED ORAL at 12:24

## 2017-10-01 RX ADMIN — CEFAZOLIN SODIUM 1000 MG: 1 SOLUTION INTRAVENOUS at 00:33

## 2017-10-01 RX ADMIN — ACETAMINOPHEN 975 MG: 325 TABLET, FILM COATED ORAL at 17:30

## 2017-10-01 RX ADMIN — ENOXAPARIN SODIUM 40 MG: 40 INJECTION SUBCUTANEOUS at 09:09

## 2017-10-01 RX ADMIN — OXYCODONE HYDROCHLORIDE 40 MG: 20 TABLET, FILM COATED, EXTENDED RELEASE ORAL at 06:15

## 2017-10-01 RX ADMIN — DOCUSATE SODIUM 100 MG: 100 CAPSULE, LIQUID FILLED ORAL at 09:09

## 2017-10-01 RX ADMIN — CEFAZOLIN SODIUM 1000 MG: 1 SOLUTION INTRAVENOUS at 17:31

## 2017-10-01 RX ADMIN — HYDROMORPHONE HYDROCHLORIDE 0.5 MG: 1 INJECTION, SOLUTION INTRAMUSCULAR; INTRAVENOUS; SUBCUTANEOUS at 01:55

## 2017-10-01 RX ADMIN — OXYCODONE HYDROCHLORIDE 40 MG: 20 TABLET, FILM COATED, EXTENDED RELEASE ORAL at 17:31

## 2017-10-01 RX ADMIN — OXYCODONE HYDROCHLORIDE 10 MG: 10 TABLET ORAL at 00:32

## 2017-10-01 RX ADMIN — SENNOSIDES 8.6 MG: 8.6 TABLET, FILM COATED ORAL at 09:09

## 2017-10-01 RX ADMIN — CEFAZOLIN SODIUM 1000 MG: 1 SOLUTION INTRAVENOUS at 09:09

## 2017-10-01 RX ADMIN — DOCUSATE SODIUM 100 MG: 100 CAPSULE, LIQUID FILLED ORAL at 17:30

## 2017-10-01 RX ADMIN — BACITRACIN ZINC 1 LARGE APPLICATION: 500 OINTMENT TOPICAL at 19:14

## 2017-10-01 RX ADMIN — ACETAMINOPHEN 975 MG: 325 TABLET, FILM COATED ORAL at 06:15

## 2017-10-01 RX ADMIN — OXYCODONE HYDROCHLORIDE 10 MG: 10 TABLET ORAL at 20:35

## 2017-10-01 RX ADMIN — HYDROMORPHONE HYDROCHLORIDE 0.5 MG: 1 INJECTION, SOLUTION INTRAMUSCULAR; INTRAVENOUS; SUBCUTANEOUS at 12:32

## 2017-10-01 RX ADMIN — HYDROMORPHONE HYDROCHLORIDE 0.5 MG: 1 INJECTION, SOLUTION INTRAMUSCULAR; INTRAVENOUS; SUBCUTANEOUS at 09:09

## 2017-10-01 RX ADMIN — ASPIRIN 81 MG 81 MG: 81 TABLET ORAL at 09:10

## 2017-10-01 RX ADMIN — HYDROMORPHONE HYDROCHLORIDE 0.5 MG: 1 INJECTION, SOLUTION INTRAMUSCULAR; INTRAVENOUS; SUBCUTANEOUS at 18:41

## 2017-10-01 RX ADMIN — HYDROMORPHONE HYDROCHLORIDE 0.5 MG: 1 INJECTION, SOLUTION INTRAMUSCULAR; INTRAVENOUS; SUBCUTANEOUS at 22:04

## 2017-10-01 NOTE — PROGRESS NOTES
Progress Note - General Surgery   Gabriela Arguello 65 y o  male MRN: [id number]  Unit/Bed#: [facility] 908-01 Encounter: [id number]    Assessment:  65 M with left ischial wound and sacral wound who underwent ischial wound rotation flap advancement and sacral wound closure on 9/27  Plan:  - dressing removal today  - bacitracin at bedside  - analgesia  - L knee dressing changes QD per nursing  - DVT ppx -- lovenox    Subjective/Objective     Subjective: Patient denies pain this AM     Objective:     Vitals: Blood pressure 98/58, pulse 85, temperature 98 4 °F (36 9 °C), temperature source Oral, resp  rate 18, height 5' 7" (1 702 m), weight 78 5 kg (173 lb), SpO2 97 %  ,Body mass index is 27 1 kg/m²  I/O       09/29 0701 - 09/30 0700 09/30 0701 - 10/01 0700    P  O  1420 630    IV Piggyback 50     Total Intake(mL/kg) 1470 (18 7) 630 (8)    Urine (mL/kg/hr) 1400 (0 7) 1850 (1)    Drains 80 (0) 85 (0)    Stool 0 (0) 0 (0)    Total Output 1480 1935    Net -10 -1305          Unmeasured Urine Occurrence  2 x          Physical Exam:  GEN: NAD  HEENT: MMM  CV: RRR  Lung: Normal effort  Ab: Soft, NT/ND  Extrem: L hip dressing C/D/I  Neuro:  A+Ox3    Lab, Imaging and other studies: CBC with diff: No results found for: WBC, HGB, HCT, MCV, PLT, ADJUSTEDWBC, MCH, MCHC, RDW, MPV, NRBC, BMP/CMP: No results found for: NA, K, CL, CO2, ANIONGAP, BUN, CREATININE, GLUCOSE, CALCIUM, AST, ALT, ALKPHOS, PROT, ALBUMIN, BILITOT, EGFR, Magnesium: No components found for: MAG  VTE Pharmacologic Prophylaxis: Enoxaparin (Lovenox)  VTE Mechanical Prophylaxis: sequential compression device

## 2017-10-02 LAB — PREALB SERPL-MCNC: 22.4 MG/DL (ref 18–40)

## 2017-10-02 PROCEDURE — 84134 ASSAY OF PREALBUMIN: CPT | Performed by: SURGERY

## 2017-10-02 RX ORDER — ACETAMINOPHEN 325 MG/1
650 TABLET ORAL EVERY 6 HOURS SCHEDULED
Status: DISCONTINUED | OUTPATIENT
Start: 2017-10-03 | End: 2017-10-04 | Stop reason: HOSPADM

## 2017-10-02 RX ADMIN — HYDROMORPHONE HYDROCHLORIDE 0.5 MG: 1 INJECTION, SOLUTION INTRAMUSCULAR; INTRAVENOUS; SUBCUTANEOUS at 23:15

## 2017-10-02 RX ADMIN — HYDROMORPHONE HYDROCHLORIDE 0.5 MG: 1 INJECTION, SOLUTION INTRAMUSCULAR; INTRAVENOUS; SUBCUTANEOUS at 08:54

## 2017-10-02 RX ADMIN — OXYCODONE HYDROCHLORIDE 40 MG: 20 TABLET, FILM COATED, EXTENDED RELEASE ORAL at 17:08

## 2017-10-02 RX ADMIN — HYDROMORPHONE HYDROCHLORIDE 0.5 MG: 1 INJECTION, SOLUTION INTRAMUSCULAR; INTRAVENOUS; SUBCUTANEOUS at 18:51

## 2017-10-02 RX ADMIN — OXYCODONE HYDROCHLORIDE 10 MG: 10 TABLET ORAL at 12:45

## 2017-10-02 RX ADMIN — SENNOSIDES 8.6 MG: 8.6 TABLET, FILM COATED ORAL at 08:54

## 2017-10-02 RX ADMIN — ACETAMINOPHEN 975 MG: 325 TABLET, FILM COATED ORAL at 06:21

## 2017-10-02 RX ADMIN — OXYCODONE HYDROCHLORIDE 40 MG: 20 TABLET, FILM COATED, EXTENDED RELEASE ORAL at 06:21

## 2017-10-02 RX ADMIN — ENOXAPARIN SODIUM 40 MG: 40 INJECTION SUBCUTANEOUS at 08:54

## 2017-10-02 RX ADMIN — HYDROMORPHONE HYDROCHLORIDE 0.5 MG: 1 INJECTION, SOLUTION INTRAMUSCULAR; INTRAVENOUS; SUBCUTANEOUS at 02:26

## 2017-10-02 RX ADMIN — CEFAZOLIN SODIUM 1000 MG: 1 SOLUTION INTRAVENOUS at 01:03

## 2017-10-02 RX ADMIN — CEFAZOLIN SODIUM 1000 MG: 1 SOLUTION INTRAVENOUS at 08:54

## 2017-10-02 RX ADMIN — HYDROMORPHONE HYDROCHLORIDE 0.5 MG: 1 INJECTION, SOLUTION INTRAMUSCULAR; INTRAVENOUS; SUBCUTANEOUS at 13:41

## 2017-10-02 RX ADMIN — OXYCODONE HYDROCHLORIDE 10 MG: 10 TABLET ORAL at 17:09

## 2017-10-02 RX ADMIN — ASPIRIN 81 MG 81 MG: 81 TABLET ORAL at 08:54

## 2017-10-02 RX ADMIN — ACETAMINOPHEN 975 MG: 325 TABLET, FILM COATED ORAL at 12:45

## 2017-10-02 RX ADMIN — ACETAMINOPHEN 975 MG: 325 TABLET, FILM COATED ORAL at 01:03

## 2017-10-02 RX ADMIN — DOCUSATE SODIUM 100 MG: 100 CAPSULE, LIQUID FILLED ORAL at 17:09

## 2017-10-02 RX ADMIN — OXYCODONE HYDROCHLORIDE 10 MG: 10 TABLET ORAL at 01:02

## 2017-10-02 RX ADMIN — DOCUSATE SODIUM 100 MG: 100 CAPSULE, LIQUID FILLED ORAL at 08:54

## 2017-10-02 RX ADMIN — CEFAZOLIN SODIUM 1000 MG: 1 SOLUTION INTRAVENOUS at 17:08

## 2017-10-02 NOTE — PROGRESS NOTES
L posterior thigh and sacral dressings taken down at bedside  L thigh sutures C/D/I without drainage  Sacral wound slightly macerated and moist  Wounds redressed with bacitracin, xeroform, 4x4s, and ioban  [facility]argav [facility]Con[facility] Surgery  [id number]

## 2017-10-02 NOTE — CASE MANAGEMENT
Continued Stay Review    Date: 10/1    Vital Signs: /63   Pulse 76   Temp 98 2 °F (36 8 °C) (Oral)   Resp 18   Ht 5' 7" (1 702 m)   Wt 78 5 kg (173 lb)   SpO2 98%   BMI 27 10 kg/m²     Medications:   Scheduled Meds:   acetaminophen 975 mg Oral Q6H WALTER   aspirin 81 mg Oral Daily   cefazolin 1,000 mg Intravenous Q8H   docusate sodium 100 mg Oral BID   enoxaparin 40 mg Subcutaneous Daily   oxyCODONE 40 mg Oral Q12H   senna 1 tablet Oral Daily     Continuous Infusions:    PRN Meds:   HYDROmorphone    ondansetron    oxyCODONE    Abnormal Labs/Diagnostic Results:    No new    Age/Sex: 65 y o  male     Assessment/Plan:   Patient Active Problem List   Diagnosis    E  coli UTI (urinary tract infection)    Stage IV pressure ulcer of sacral region    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, stage 3    Stage IV pressure ulcer of left heel    Cyst of joint of shoulder    Anemia    Paraplegic spinal paralysis    Sinus tachycardia    GERD (gastroesophageal reflux disease)    History of osteomyelitis    History of diabetes mellitus       Assessment:  65 M with left ischial wound and sacral wound who underwent ischial wound rotation flap advancement and sacral wound closure on 9/27      Plan:  Dressing change yesterday - will do dressing changes q2days   Daily dressing changes by nurses with maxorb to knee  Diet as tolerated  Continue to monitor BALTAZAR Drain output  SCDs, Lovenox for DVT Ppx  PT/OT   Follow up with CM on dispo plan       Discharge Plan: Home when medically cleared

## 2017-10-02 NOTE — PROGRESS NOTES
Progress Note - Plastic Surgery   Marshall Arguello 65 y o  male MRN: [id number]  Unit/Bed#: [facility] 908-01 Encounter: [id number]    Assessment and Plan:  Patient Active Problem List   Diagnosis    E  coli UTI (urinary tract infection)    Stage IV pressure ulcer of sacral region    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, stage 3    Stage IV pressure ulcer of left heel    Cyst of joint of shoulder    Anemia    Paraplegic spinal paralysis    Sinus tachycardia    GERD (gastroesophageal reflux disease)    History of osteomyelitis    History of diabetes mellitus       Assessment:  65 M with left ischial wound and sacral wound who underwent ischial wound rotation flap advancement and sacral wound closure on 9/27      Plan:  Dressing change yesterday - will do dressing changes q2days   Daily dressing changes by nurses with maxorb to knee  Diet as tolerated  Continue to monitor BALTAZAR Drain output  SCDs, Lovenox for DVT Ppx  PT/OT   Follow up with CM on dispo plan    Subjective: No complaints  Pt states his pain is well controlled  Dressing was changed yesterday at bedside without difficulties - see image / note 10/1/17  Drain with 75cc over past 24hours (from 85cc)    Objective:      Vitals   Vitals:    10/01/17 0734 10/01/17 0820 10/01/17 1525 10/02/17 0011   BP: 94/58  102/56 104/63   Pulse: 71  102 80   Resp: 16  18 18   Temp: 98 3 °F (36 8 °C)  99 1 °F (37 3 °C) 98 7 °F (37 1 °C)   TempSrc:   Oral    SpO2: 94% 95% 95% 96%   Weight:       Height:         Temp  Min: 97 8 °F (36 6 °C)  Max: 100 5 °F (38 1 °C)  IBW: 66 1 kg   Body mass index is 27 1 kg/m²  I/O   I/O       09/30 0701 - 10/01 0700 10/01 0701 - 10/02 0700    P  O  630 1200    IV Piggyback 150 100    Total Intake(mL/kg) 780 (9 9) 1300 (16 6)    Urine (mL/kg/hr) 1850 (1) 3000 (1 6)    Drains 85 (0) 50 (0)    Stool 0 (0) 0 (0)    Total Output 1935 3050    Net -1155 -1750          Unmeasured Urine Occurrence 2 x           Intake/Output Summary (Last 24 hours) at 10/02/17 4309  Last data filed at 10/01/17 2250   Gross per 24 hour   Intake             1450 ml   Output             3050 ml   Net            -1600 ml       Invasive  Devices  Invasive Devices     Peripheral Intravenous Line            Peripheral IV 09/27/17 Left Arm 4 days          Drain            Colostomy Descending/sigmoid LLQ 80447 days    Closed/Suction Drain Left;Posterior; Other (Comment) Buttock Bulb 15 Fr  4 days                Active medications  Scheduled Meds:  acetaminophen 975 mg Oral Q6H WALTER   aspirin 81 mg Oral Daily   cefazolin 1,000 mg Intravenous Q8H   docusate sodium 100 mg Oral BID   enoxaparin 40 mg Subcutaneous Daily   oxyCODONE 40 mg Oral Q12H   senna 1 tablet Oral Daily     Continuous Infusions:     PRN Meds:     HYDROmorphone 0 5 mg Q2H PRN   ondansetron 4 mg Q6H PRN   oxyCODONE 10 mg Q4H PRN   oxyCODONE 5 mg Q4H PRN       Physical Exam: /63   Pulse 80   Temp 98 7 °F (37 1 °C)   Resp 18   Ht 5' 7" (1 702 m)   Wt 78 5 kg (173 lb)   SpO2 96%   BMI 27 10 kg/m²     Physical Exam:  General Appearance: Alert, cooperative, no distress, appears stated age  Lungs: Clear to auscultation bilaterally, respirations unlablored   Heart: Regular rate and rhythm, S1 and S2 normal, no murmur, rub or gallop  Abdomen: Soft, non-tender, non distended, bowel sounds active in all four quadrants,   No masses or organomegaly  Sacral region - large wound presenting CDI with Ioban dressing - no drainage noted     Laboratory and Diagnostics:    Results from last 7 days  Lab Units 09/29/17  0447 09/28/17  0457 09/27/17  1151   WBC Thousand/uL 8 02 10 77*  --    HEMOGLOBIN g/dL 9 7* 10 6* 11 0*   HEMATOCRIT % 30 8* 33 6* 34 5*   PLATELETS Thousands/uL 363 510*  --    NEUTROS PCT % 60 65  --    MONOS PCT % 6 9  --        Results from last 7 days  Lab Units 09/28/17  0457   SODIUM mmol/L 141   POTASSIUM mmol/L 3 4*   CHLORIDE mmol/L 109*   CO2 mmol/L 25   BUN mg/dL 11   CREATININE mg/dL 0 42* CALCIUM mg/dL 8 3   GLUCOSE RANDOM mg/dL 85         Lab Results   Component Value Date    PHOS 2 1 (L) 10/17/2016    PHOS 3 5 01/05/2014          No results found for: TROPONINT  ABG:  Lab Results   Component Value Date    PHART 7 439 10/17/2016    EAA2KBY 31 7 (L) 10/17/2016    PO2ART 65 4 (L) 10/17/2016    PRU4PRY 21 0 (L) 10/17/2016    BEART -2 8 10/17/2016    SOURCE Line, Arterial 10/17/2016       Blood Culture:   Lab Results   Component Value Date    BLOODCX No Growth After 5 Days  12/03/2016    BLOODCX No Growth After 5 Days  12/03/2016    BLOODCX No Growth After 5 Days  10/15/2016    BLOODCX No Growth After 5 Days   10/15/2016     Urine Culture:   Lab Results   Component Value Date    URINECX >100,000 cfu/ml Escherichia coli 07/28/2017    URINECX <10,000 cfu/ml Mixed Contaminants X2 06/20/2017    URINECX <10,000 cfu/ml Gram Negative Jackson 05/01/2017    URINECX >100,000 cfu/ml Klebsiella pneumoniae 11/23/2016    URINECX >100,000 cfu/ml Escherichia coli 10/15/2016    URINECX >100,000 cfu/ml Escherichia coli 10/15/2016       VTE Pharmacologic Prophylaxis: Enoxaparin (Lovenox)  VTE Mechanical Prophylaxis: sequential compression device

## 2017-10-03 RX ADMIN — DOCUSATE SODIUM 100 MG: 100 CAPSULE, LIQUID FILLED ORAL at 17:36

## 2017-10-03 RX ADMIN — OXYCODONE HYDROCHLORIDE 10 MG: 10 TABLET ORAL at 20:50

## 2017-10-03 RX ADMIN — ACETAMINOPHEN 650 MG: 325 TABLET, FILM COATED ORAL at 06:43

## 2017-10-03 RX ADMIN — DOCUSATE SODIUM 100 MG: 100 CAPSULE, LIQUID FILLED ORAL at 08:03

## 2017-10-03 RX ADMIN — OXYCODONE HYDROCHLORIDE 40 MG: 20 TABLET, FILM COATED, EXTENDED RELEASE ORAL at 16:49

## 2017-10-03 RX ADMIN — HYDROMORPHONE HYDROCHLORIDE 0.5 MG: 1 INJECTION, SOLUTION INTRAMUSCULAR; INTRAVENOUS; SUBCUTANEOUS at 08:03

## 2017-10-03 RX ADMIN — OXYCODONE HYDROCHLORIDE 10 MG: 10 TABLET ORAL at 16:48

## 2017-10-03 RX ADMIN — CEFAZOLIN SODIUM 1000 MG: 1 SOLUTION INTRAVENOUS at 09:44

## 2017-10-03 RX ADMIN — CEFAZOLIN SODIUM 1000 MG: 1 SOLUTION INTRAVENOUS at 16:49

## 2017-10-03 RX ADMIN — CEFAZOLIN SODIUM 1000 MG: 1 SOLUTION INTRAVENOUS at 00:42

## 2017-10-03 RX ADMIN — HYDROMORPHONE HYDROCHLORIDE 0.5 MG: 1 INJECTION, SOLUTION INTRAMUSCULAR; INTRAVENOUS; SUBCUTANEOUS at 12:26

## 2017-10-03 RX ADMIN — ACETAMINOPHEN 650 MG: 325 TABLET, FILM COATED ORAL at 00:42

## 2017-10-03 RX ADMIN — HYDROMORPHONE HYDROCHLORIDE 0.5 MG: 1 INJECTION, SOLUTION INTRAMUSCULAR; INTRAVENOUS; SUBCUTANEOUS at 22:45

## 2017-10-03 RX ADMIN — ASPIRIN 81 MG 81 MG: 81 TABLET ORAL at 08:03

## 2017-10-03 RX ADMIN — HYDROMORPHONE HYDROCHLORIDE 0.5 MG: 1 INJECTION, SOLUTION INTRAMUSCULAR; INTRAVENOUS; SUBCUTANEOUS at 17:35

## 2017-10-03 RX ADMIN — OXYCODONE HYDROCHLORIDE 40 MG: 20 TABLET, FILM COATED, EXTENDED RELEASE ORAL at 06:42

## 2017-10-03 RX ADMIN — ACETAMINOPHEN 650 MG: 325 TABLET, FILM COATED ORAL at 17:36

## 2017-10-03 RX ADMIN — ENOXAPARIN SODIUM 40 MG: 40 INJECTION SUBCUTANEOUS at 08:03

## 2017-10-03 RX ADMIN — ACETAMINOPHEN 650 MG: 325 TABLET, FILM COATED ORAL at 12:26

## 2017-10-03 RX ADMIN — OXYCODONE HYDROCHLORIDE 10 MG: 10 TABLET ORAL at 00:42

## 2017-10-03 RX ADMIN — SENNOSIDES 8.6 MG: 8.6 TABLET, FILM COATED ORAL at 08:03

## 2017-10-03 NOTE — PROGRESS NOTES
Clinical documentation called to question a stage 4 ulcer on patient's heel  Patient does not currently have a stage 4 ulcer  Evidence of old ulcer that has healed

## 2017-10-03 NOTE — PHYSICAL THERAPY NOTE
Physical Therapy Cancellation Note:  Pt has orders for PT evaluation   Pt also has bedrest orders and is s/p sacral wound rotation advancement falp and complex closure surgery 9/27/17  Placed a call to Dr Candace Lieberman re: activity orders, rec's for appropriate activities  Left a message on Plastic Surgeons' phone  RN also in the process of contacting [facility] for recommendations  Plan to complete PT eval once MD's recommendations have been received      Lawyer Pacheco, PT

## 2017-10-03 NOTE — OCCUPATIONAL THERAPY NOTE
Occupational Therapy         Patient Name: David Steward  MZTFO'R Date: 10/3/2017      OT ORDERS RECEIVED AND CHART REVIEWED - PT WITH BASELINE PARAPLEGIA - REQUIRES TOTAL ASSIST AT BASELINE FOR ALL SELF CARE - [address] 16HRS/DAY TO ASSIST WITH SAME - NO ACUTE OT NEEDS INDICATED AT THIS TIME - D/C FROM [facility]    Jason Simmonds, OT

## 2017-10-03 NOTE — PROGRESS NOTES
Progress Note - Plastic Surgery   Maribell Anali Moscat 65 y o  male MRN: [id number]  Unit/Bed#: [facility] 908-01 Encounter: [id number]    Assessment:  65 M with ischial and sacral wound, s/p rotation advancement flap and complex closure    Plan:  Dressing change today  Continue Iglesia Riggs  PT/OT    Subjective/Objective     Subjective: No acute events  Denies fevers/chills  Objective:    Blood pressure 128/75, pulse 55, temperature 99 2 °F (37 3 °C), temperature source Oral, resp  rate 18, height 5' 7" (1 702 m), weight 78 5 kg (173 lb), SpO2 98 %  ,Body mass index is 27 1 kg/m²  Intake/Output Summary (Last 24 hours) at 10/03/17 0655  Last data filed at 10/03/17 4982   Gross per 24 hour   Intake             1820 ml   Output             3276 ml   Net            -1456 ml       Invasive Devices     Peripheral Intravenous Line            Peripheral IV 10/02/17 Right Antecubital less than 1 day          Drain            Colostomy Descending/sigmoid LLQ 78741 days    Closed/Suction Drain Left;Posterior; Other (Comment) Buttock Bulb 15 Fr  5 days                Physical Exam:   General: NAD, AAOx3  CV: RRR +S1/S2  Chest: breath sounds bilaterally  Abdomen: soft, NT ND  Left sacreal /ischial wound dressing c/d/i  Extremities: atraumatic, no edema        Results from last 7 days  Lab Units 09/29/17  0447 09/28/17  0457 09/27/17  1151   WBC Thousand/uL 8 02 10 77*  --    HEMOGLOBIN g/dL 9 7* 10 6* 11 0*   HEMATOCRIT % 30 8* 33 6* 34 5*   PLATELETS Thousands/uL 363 510*  --        Results from last 7 days  Lab Units 09/28/17  0457   SODIUM mmol/L 141   POTASSIUM mmol/L 3 4*   CHLORIDE mmol/L 109*   CO2 mmol/L 25   BUN mg/dL 11   CREATININE mg/dL 0 42*   GLUCOSE RANDOM mg/dL 85   CALCIUM mg/dL 8 3

## 2017-10-04 VITALS
RESPIRATION RATE: 16 BRPM | DIASTOLIC BLOOD PRESSURE: 59 MMHG | SYSTOLIC BLOOD PRESSURE: 101 MMHG | BODY MASS INDEX: 27.15 KG/M2 | HEART RATE: 90 BPM | OXYGEN SATURATION: 96 % | HEIGHT: 67 IN | TEMPERATURE: 98.5 F | WEIGHT: 173 LBS

## 2017-10-04 RX ORDER — OXYCODONE HYDROCHLORIDE 5 MG/1
5 TABLET ORAL EVERY 4 HOURS PRN
Qty: 15 TABLET | Refills: 0
Start: 2017-10-04 | End: 2019-03-19 | Stop reason: ALTCHOICE

## 2017-10-04 RX ADMIN — ACETAMINOPHEN 650 MG: 325 TABLET, FILM COATED ORAL at 05:48

## 2017-10-04 RX ADMIN — HYDROMORPHONE HYDROCHLORIDE 0.5 MG: 1 INJECTION, SOLUTION INTRAMUSCULAR; INTRAVENOUS; SUBCUTANEOUS at 08:49

## 2017-10-04 RX ADMIN — CEFAZOLIN SODIUM 1000 MG: 1 SOLUTION INTRAVENOUS at 08:49

## 2017-10-04 RX ADMIN — OXYCODONE HYDROCHLORIDE 40 MG: 20 TABLET, FILM COATED, EXTENDED RELEASE ORAL at 05:48

## 2017-10-04 RX ADMIN — CEFAZOLIN SODIUM 1000 MG: 1 SOLUTION INTRAVENOUS at 00:31

## 2017-10-04 RX ADMIN — ASPIRIN 81 MG 81 MG: 81 TABLET ORAL at 08:14

## 2017-10-04 RX ADMIN — ENOXAPARIN SODIUM 40 MG: 40 INJECTION SUBCUTANEOUS at 08:14

## 2017-10-04 RX ADMIN — ACETAMINOPHEN 650 MG: 325 TABLET, FILM COATED ORAL at 00:31

## 2017-10-04 RX ADMIN — OXYCODONE HYDROCHLORIDE 10 MG: 10 TABLET ORAL at 00:34

## 2017-10-04 RX ADMIN — ACETAMINOPHEN 650 MG: 325 TABLET, FILM COATED ORAL at 11:24

## 2017-10-04 RX ADMIN — SENNOSIDES 8.6 MG: 8.6 TABLET, FILM COATED ORAL at 08:14

## 2017-10-04 RX ADMIN — DOCUSATE SODIUM 100 MG: 100 CAPSULE, LIQUID FILLED ORAL at 08:14

## 2017-10-04 RX ADMIN — HYDROMORPHONE HYDROCHLORIDE 0.5 MG: 1 INJECTION, SOLUTION INTRAMUSCULAR; INTRAVENOUS; SUBCUTANEOUS at 11:24

## 2017-10-04 RX ADMIN — OXYCODONE HYDROCHLORIDE 10 MG: 10 TABLET ORAL at 07:40

## 2017-10-04 NOTE — SOCIAL WORK
Per review of chart, Pt is medically ready for D/C today and will require [facility] for wound care  Pt is current with [facility], Referral sent for resumption of services  Pt has 16hrs of home health aide care daily, Pt will contact agencies to resume services  Pt will require BLS transportation, scheduled with [facility] at 4:30PM to Pt's home  Pt has automatic W/c at bedside that will need to be transported to Pt's home separately  Per Roger Rodriguez with [facility]  services, Elisa Castorena can provide the transport service tomorrow 10/05  CM will contact Judith Carranza in AM to confirm transportation time  RN and CC aware

## 2017-10-04 NOTE — PROGRESS NOTES
Progress Note - Plastic Surgery   Rneo Shane Arguello 65 y o  male MRN: [id number]  Unit/Bed#: [facility] 908-01 Encounter: [id number]    Assessment:  65 M with ischial and sacral wounds, s/p rotation flap and closure    Plan:  Dressings changed yesterday, will change every other day with bacitracin/xeroform/4x4/ioban  Continue BALTAZAR drain  Lovenox  CM to set up [facility] for home dressing changes/drain care  Dispo planning    Subjective/Objective     Subjective: No acute events  Feels well  Slept well  Tolerating diet  Voiding on own, ostomy functioning  Objective:    Blood pressure 106/62, pulse 78, temperature 99 2 °F (37 3 °C), temperature source Oral, resp  rate 16, height 5' 7" (1 702 m), weight 78 5 kg (173 lb), SpO2 97 %  ,Body mass index is 27 1 kg/m²  Intake/Output Summary (Last 24 hours) at 10/04/17 0539  Last data filed at 10/04/17 0037   Gross per 24 hour   Intake              805 ml   Output             2140 ml   Net            -1335 ml       Invasive Devices     Peripheral Intravenous Line            Peripheral IV 10/02/17 Right Antecubital 1 day          Drain            Colostomy Descending/sigmoid LLQ 09151 days    Closed/Suction Drain Left;Posterior; Other (Comment) Buttock Bulb 15 Fr  6 days                Physical Exam:   General: NAD, AAOx3  CV: RRR +S1/S2  Chest: breath sounds bilaterally  Abdomen: soft, NT ND  Ostomy in place with brown stool output  Extremities: Sacral/ischial wounds dressed with xeroform/4x4/ioban, dressings c/d/i          Results from last 7 days  Lab Units 09/29/17  0447 09/28/17  0457 09/27/17  1151   WBC Thousand/uL 8 02 10 77*  --    HEMOGLOBIN g/dL 9 7* 10 6* 11 0*   HEMATOCRIT % 30 8* 33 6* 34 5*   PLATELETS Thousands/uL 363 510*  --        Results from last 7 days  Lab Units 09/28/17  0457   SODIUM mmol/L 141   POTASSIUM mmol/L 3 4*   CHLORIDE mmol/L 109*   CO2 mmol/L 25   BUN mg/dL 11   CREATININE mg/dL 0 42*   GLUCOSE RANDOM mg/dL 85   CALCIUM mg/dL 8 3

## 2017-10-04 NOTE — DISCHARGE INSTRUCTIONS
Wound Care Instructions:    Dressing change to be completed every other day by visiting nursing   Bacitracin, Xeroform, 4x4 and cover with Ranelle Sanes  Patient to be on bedrest,     Keep BALTAZAR, record output every other day   Please bring this list of outputs to your next follow up appointment     Follow up in office with Dr Hank Lechuga in 2 weeks

## 2017-10-05 ENCOUNTER — GENERIC CONVERSION - ENCOUNTER (OUTPATIENT)
Dept: OTHER | Facility: OTHER | Age: 57
End: 2017-10-05

## 2017-10-05 NOTE — SOCIAL WORK
10/05/2017 12:10PM DENIS delivered Pt's W/c to [address] [place] [address] for [employer] services to  and deliver to Pt's home
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
## 2017-10-18 ENCOUNTER — GENERIC CONVERSION - ENCOUNTER (OUTPATIENT)
Dept: OTHER | Facility: OTHER | Age: 57
End: 2017-10-18

## 2017-10-23 ENCOUNTER — GENERIC CONVERSION - ENCOUNTER (OUTPATIENT)
Dept: OTHER | Facility: OTHER | Age: 57
End: 2017-10-23

## 2017-10-25 ENCOUNTER — ALLSCRIPTS OFFICE VISIT (OUTPATIENT)
Dept: OTHER | Facility: OTHER | Age: 57
End: 2017-10-25

## 2017-10-26 NOTE — PROGRESS NOTES
Assessment  1  Decubitus ulcer of left ischium, stage 4 (058 37,545 58) (L89 324)   2  Decubitus ulcer of sacral region, stage 4 (849 97,445 81) (Z43 582)    Discussion/Summary  Discussion Summary:   60-year-old male with left ischial and sacral wounds presents for evaluation for closure  I discussed the risks, benefits and alternatives of redo advancement of the anterior thigh flap, re-advancement of the left posterior thigh flap and left gluteal rotation flap  I discussed risks of bleeding, infection, scar and dehiscence  He understands this and wishes to proceed  I personally obtained his informed consent  Chief Complaint  Chief Complaint Free Text Note Form: recurrent left ischial and sacral wounds      History of Present Illness  HPI: 60-year-old quadriplegic male status post right hip disarticulation with anterior thigh flap and rotational flap closure of left ischial wound who subsequently developed ischial and sacral ulcers that was closed with VY advancement flap from the left posterior thigh and advancement of the anterior thigh flap  Unfortunately these wounds have recurred  He presents today for reevaluation for surgery  Review of Systems  Complete-Male:   Constitutional: No fever or chills, feels well, no tiredness, no recent weight gain or weight loss  Eyes: No complaints of eye pain, no red eyes, no discharge from eyes, no itchy eyes  ENT: no complaints of earache, no hearing loss, no nosebleeds, no nasal discharge, no sore throat, no hoarseness  Cardiovascular: No complaints of slow heart rate, no fast heart rate, no chest pain, no palpitations, no leg claudication, no lower extremity  Respiratory: No complaints of shortness of breath, no wheezing, no cough, no SOB on exertion, no orthopnea or PND  Gastrointestinal: No complaints of abdominal pain, no constipation, no nausea or vomiting, no diarrhea or bloody stools     Genitourinary: No complaints of dysuria, no incontinence, no hesitancy, no nocturia, no genital lesion, no testicular pain  Musculoskeletal: No complaints of arthralgia, no myalgias, no joint swelling or stiffness, no limb pain or swelling  Integumentary: No complaints of skin rash or skin lesions, no itching, no skin wound, no dry skin  Neurological: as noted in HPI  Psychiatric: Is not suicidal, no sleep disturbances, no anxiety or depression, no change in personality, no emotional problems  Endocrine: No complaints of proptosis, no hot flashes, no muscle weakness, no erectile dysfunction, no deepening of the voice, no feelings of weakness  Hematologic/Lymphatic: No complaints of swollen glands, no swollen glands in the neck, does not bleed easily, no easy bruising  ROS Reviewed:   ROS reviewed  Active Problems  1  Anemia (285 9) (D64 9)   2  Benign essential hypertension (401 1) (I10)   3  Cellulitis of right lower extremity (682 6) (L03 115)   4  Cellulitis of upper arm (682 3) (L03 119)   5  Chronic ulcer of sacral region (707 8) (L98 429)   6  Colon cancer screening (V76 51) (Z12 11)   7  Constipation (564 00) (K59 00)   8  Decubitus ulcer of left heel, stage 3 (102 85,761 33) (S75 772)   9  Decubitus ulcer of left heel, unstageable (707 07,707 25) (L89 620)   10  Decubitus ulcer of left ischium, stage 4 (859 37,274 74) (L89 324)   11  Decubitus ulcer of left knee, stage 3 (707 09,707 23) (F50 896)   12  Decubitus ulcer of sacral region, stage 4 (707 03,707 24) (L89 154)   13  Dehiscence of surgical wound (998 32) (T81 31XA)   14  Depression screening (V79 0) (Z13 89)   15  Diabetic eye exam (V72 0,250 00) (E11 9,Z01 00)   16  DM type 2 (diabetes mellitus, type 2) (250 00) (E11 9)   17  Dyslipidemia (272 4) (E78 5)   18  Dysuria (788 1) (R30 0)   19  Edema (782 3) (R60 9)   20  Encounter for diabetic foot exam (250 00) (E11 9)   21  Mass of right upper extremity (782 2) (R22 31)   22  Need for immunization against influenza (V04 81) (Z23)   23   Need for prophylactic vaccination and inoculation against influenza (V04 81) (Z23)   24  Need for Tdap vaccination (V06 1) (Z23)   25  Neurogenic bladder (596 54) (N31 9)   26  Open wound of hip and thigh (890 0) (S71 009A,S71 109A)   27  Open wound of left knee without complication (912 9) (H34 118V)   28  Open wound of right upper arm, initial encounter (880 03) (S41 101A)   29  Other insomnia (780 52) (G47 09)   30  Pain in joint of right shoulder (719 41) (M25 511)   31  Paraplegic spinal paralysis (344 1) (G82 20)   32  Recurrent UTI (urinary tract infection) (599 0) (N39 0)   33  Screening for depression (V79 0) (Z13 89)   34  Status post flap graft (V45 89) (Z98 890)   35  Thrombocytosis (238 71) (D47 3)   36  Unspecified open wound, left ankle, sequela (906 1) (S91 002S)   37  Urinary tract infection (599 0) (N39 0)   38  Visit for pre-operative examination (V72 84) (K07 023)    Past Medical History  1  History of Clostridium difficile infection (V12 09) (Z86 19)   2  Denied: History of drug abuse   3  History of fever (V13 89) (Z87 898)   4  Denied: History of mental disorder   5  History of Meningitis (V12 42)   6  History of Poorly controlled type 2 diabetes mellitus (250 00) (E11 65)    Surgical History  1  History of Amputation At Hip   2  History of Bladder Surgery   3  History of Lower Back Surgery  Surgical History Reviewed: The surgical history was reviewed and updated today  Family History  Mother    1  Denied: Family history of Drug abuse   2  No family history of mental disorder   3  Patient's father is  (V19 8) (Z80 80)   4  Patient's mother is  (V24 11) (Z80 80)  Father    5  Denied: Family history of Drug abuse   6  No family history of mental disorder   7  Patient's father is  (V19 8) (Z80 80)   6  Patient's mother is  (V24 11) (Z80 80)  Family History    9  Family history of No Significant Family History  Family History Reviewed:    The family history was reviewed and updated today  Social History   · Being A Social Drinker   · Former smoker (M32 22) (E33 549)   · Native Language South Korean   · No drug use   · [employer]   · Social history reviewed, unchanged  Social History Reviewed: The social history was reviewed and updated today  The social history was reviewed and is unchanged  Current Meds   1  Accu-Chek FastClix Lancets Miscellaneous; TEST TWICE DAILY AS DIRECTED; Therapy: 22KEO0579 to ([phone] [phone])  Requested for: 79ZJX4375; Last   Rx:08Nov2016 Ordered   2  Accu-Chek SmartView In [employer]; test twice daily; Therapy: 20AVA2020 to (96 [phone])  Requested for: 37ZSV6476; Last   Rx:07Nov2016 Ordered   3  Aspirin EC Low Dose 81 MG Oral Tablet Delayed Release; take 1 tablet by mouth every   day; Therapy: 96OAC1524 to (Evaluate:67Hpy2662)  Requested for: 23Mar2017; Last   Rx:23Mar2017 Ordered   4  Cephalexin 500 MG Oral Capsule; TAKE 1 CAPSULE 3 TIMES DAILY; Therapy: 58NPC0347 to (Evaluate:30Jun2017)  Requested for: 35SAH7969; Last   FL:83HFK9894 Ordered   5  Disposable Underpads 30x36 Miscellaneous; change upto 8 times a day; Therapy: 49OKK5013 to (Last Rx:17May2017)  Requested for: 36ILK8820 Ordered   6  Ferrous Sulfate 325 (65 Fe) MG Oral Tablet; TAKE 1 TABLET 2 TIMES DAILY AFTER   MEALS; Therapy: 51IJT5906 to (Evaluate:53Vhu5124)  Requested for: 28Gec4561; Last   Rx:08Nov2016 Ordered   7  Gauze Pads 4X4 Pad; use to cover wound change daily; Therapy: 19HSP6311 to (Last Rx:00Tag3959)  Requested for: 28SWC1940 Ordered   8  Glucerna Oral Liquid; drink 3 cans per day; Therapy: 46PYU3724 to (Last Rx:17May2017)  Requested for: 76Noa2952 Ordered   9  Glucerna Oral Liquid; drink 3 cans per day; Therapy: 30WXW4073 to (Last Rx:05Jan2017)  Requested for: 69FJA4110 Ordered   10  Ibuprofen 800 MG Oral Tablet; TAKE ONE TABLET BY MOUTH EVERY DAY AS NEEDED;     Therapy: 36LBE3171 to (Evaluate:93Fjo0607)  Requested for: 97JKK8702; Last    Rx:14Uio0346 Ordered   11  Lidocaine HCl GEL; 2%gel applied to wound/s prior to debridement  at the [place] [address] for pain; Therapy: (Melania Reyes) to Recorded   12  Morphine Sulfate ER 60 MG Oral Tablet Extended Release; take 1 po every 12 hours; Therapy: 87UOT8132 to (Last Rx:08Nov2016) Ordered   13  Omeprazole 20 MG Oral Capsule Delayed Release; TAKE ONE CAPSULE BY MOUTH    EVERY NIGHT AT BEDTIME; Therapy: 40AOH3399 to (Evaluate:59Axv1485)  Requested for: 23Mar2017; Last    Rx:23Mar2017 Ordered   14  OxyCODONE HCl - 20 MG Oral Tablet; Take 1 pill every 6 hours if needed for pain; Therapy: 73TAE8701 to (Evaluate:09Sep2017); Last Rx:11Aug2017 Ordered   15  Premier Urostomy 2-1/2 Pouch Miscellaneous; USE AS DIRECTED; Therapy: 99PPQ9101 to (Last Rx:13May2016)  Requested for: 55ABQ4440 Ordered   16  Premium Skin Barrier Miscellaneous; USE AS DIRECTED; Therapy: 64JTN8710 to (Last Rx:13May2016)  Requested for: 24WEM8977 Ordered   17  Santyl 250 UNIT/GM External Ointment; APPLY TO AFFECTED AREA(S) ONCE DAILY AS    DIRECTED; Therapy: 50Gvh2327 to (Last Yetta Del Angel)  Requested for: 16DBW0262 Ordered   18  Skin Prep Wipes Miscellaneous; USE AS DIRECTED; Therapy: 23HIQ6921 to (Last Rx:13May2016)  Requested for: 37OVF0746 Ordered   19  SM Rolled Gauze 3x4 1yd Miscellaneous; USE AS DIRECTED; Therapy: 88QDE6441 to (Last Rx:13May2016)  Requested for: 79JBZ6959 Ordered   20  Surgical Dressing 5X9 Pad; USE AS DIRECTED; Therapy: 67BIN4950 to (Last Rx:13May2016)  Requested for: 13KLE0802 Ordered   21  Tegaderm + Pad 2x2-3/4 Miscellaneous; USE AS DIRECTED; Therapy: 44EMA1376 to (Last Rx:13May2016)  Requested for: 05CRU2432 Ordered   22  Underpads Extra Large Miscellaneous; change twice daily; Therapy: 11SWQ1699 to (Last Rx:07Jun2017)  Requested for: 07Jun2017 Ordered   23  Underpads Large MISC; 4 pads per month;     Therapy: 80CGG9839 to (Last OU:10UTB5602)  Requested for: 58PMY9123 Ordered   24  Underpads Miscellaneous; change twice weekly; Therapy: 41CBA7065 to (Last Rx:84Imw9594)  Requested for: 22EKP7707 Ordered   25  Vitamin C 500 MG Oral Tablet; take 1 tablet by mouth twice daily; Therapy: 42DUM4614 to (Evaluate:21Mar2017)  Requested for: 56FRC6895; Last    Rx:57Vqa9702 Ordered   26  Vitamin D3 5000 UNIT Oral Tablet; 1 Tab daily; Therapy: 41BLB8616 to (Last Rx:57Gje7347)  Requested for: 15TWR8432 Ordered  Medication List Reviewed: The medication list was reviewed and updated today  Allergies  1  No Known Drug Allergies    Vitals  Vital Signs    Recorded: 84Pcf8082 10:42AM   BP Comments unobtainable   Height Unobtainable Yes   Weight Unobtainable Yes     Physical Exam    Constitutional   General appearance: No acute distress, well appearing and well nourished  Eyes   Conjunctiva and lids: No swelling, erythema, or discharge  Ears, Nose, Mouth, and Throat   External inspection of ears and nose: Normal     Pulmonary   Auscultation of lungs: Clear to auscultation, equal breath sounds bilaterally, no wheezes, no rales, no rhonci  Cardiovascular   Auscultation of heart: Normal rate and rhythm, normal S1 and S2, without murmurs  Skin 4 x 5 cm x 3 cm deep ischial wound, stage IV  2 x 3 cm sacral wound, stage IV          Future Appointments    Date/Time Provider Specialty Site   11/10/2017 01:00 PM Jackye Hashimoto, M [facility]     Signatures   Electronically signed by : GIUSEPPE Montaño ; Oct 25 2017 11:45AM EST                       (Author)

## 2017-10-26 NOTE — PROGRESS NOTES
Post-Op  Post Op [facility]: 71-year-old quadriplegic male status post advancement of his posterior thigh flap for a sacral wound and re-advancement of his left gluteal rotation flap and left posterior thigh flap for his ischial wound  He has been on bedrest for the past 3 weeks  His incisions have healed well  There is a small area of superficial skin slough at the superior aspect of the gluteal rotation flap as well as the inferior medial portion of his gluteal rotational flap  I have gently debrided these areas and there is punctate bleeding underneath  I have applied a Dermagran dressing to both  He has some dehiscence once again at the sacrum  There is a 2 x 2 cm wound therapy  there is no fibrinous exudate in this region  I have placed an alginate dressing  I will see him back in 2 weeks to reevaluate  Active Problems   1  Anemia (285 9) (D64 9)   2  Benign essential hypertension (401 1) (I10)   3  Cellulitis of right lower extremity (682 6) (L03 115)   4  Cellulitis of upper arm (682 3) (L03 119)   5  Chronic ulcer of sacral region (707 8) (L98 429)   6  Colon cancer screening (V76 51) (Z12 11)   7  Constipation (564 00) (K59 00)   8  Decubitus ulcer of left heel, stage 3 (397 66,171 76) (O54 220)   9  Decubitus ulcer of left heel, unstageable (707 07,707 25) (L89 620)   10  Decubitus ulcer of left knee, stage 3 (707 09,707 23) (L89 893)   11  Dehiscence of surgical wound (998 32) (T81 31XA)   12  Depression screening (V79 0) (Z13 89)   13  Diabetic eye exam (V72 0,250 00) (E11 9,Z01 00)   14  DM type 2 (diabetes mellitus, type 2) (250 00) (E11 9)   15  Dyslipidemia (272 4) (E78 5)   16  Dysuria (788 1) (R30 0)   17  Edema (782 3) (R60 9)   18  Encounter for diabetic foot exam (250 00) (E11 9)   19  Mass of right upper extremity (782 2) (R22 31)   20  Need for immunization against influenza (V04 81) (Z23)   21  Need for prophylactic vaccination and inoculation against influenza (V04 81) (Z23)   22   Need for Tdap vaccination (V06 1) (Z23)   23  Neurogenic bladder (596 54) (N31 9)   24  Open wound of hip and thigh (890 0) (S71 009A,S71 109A)   25  Open wound of left knee without complication (818 6) (S09 499J)   26  Open wound of right upper arm, initial encounter (880 03) (S41 101A)   27  Other insomnia (780 52) (G47 09)   28  Pain in joint of right shoulder (719 41) (M25 511)   29  Paraplegic spinal paralysis (344 1) (G82 20)   30  Recurrent UTI (urinary tract infection) (599 0) (N39 0)   31  Screening for depression (V79 0) (Z13 89)   32  Status post flap graft (V45 89) (Z98 890)   33  Thrombocytosis (238 71) (D47 3)   34  Unspecified open wound, left ankle, sequela (906 1) (S91 002S)   35  Urinary tract infection (599 0) (N39 0)   36  Visit for pre-operative examination (V72 84) (Z01 818)    Decubitus ulcer of left ischium, stage 4 (707 05) (D30 931)       Decubitus ulcer of sacral region, stage 4 (707 03) (V21 222)          Social History   · Being A Social Drinker   · Former smoker (V15 82) (G79 360)   · Native Language Bolivian   · No drug use   · [employer]   · Social history reviewed, unchanged    Current Meds   1  Accu-Chek FastClix Lancets Miscellaneous; TEST TWICE DAILY AS DIRECTED; Therapy: 51QLW3717 to [phone])  Requested for: 35RMP5886; Last   Rx:08Nov2016 Ordered   2  Accu-Chek SmartView In [employer]; test twice daily; Therapy: 96PNZ6486 to [phone])  Requested for: 05RHK9575; Last   Rx:07Nov2016 Ordered   3  Aspirin EC Low Dose 81 MG Oral Tablet Delayed Release; take 1 tablet by mouth every   day; Therapy: 90WZD7069 to (Evaluate:16Xji9941)  Requested for: 23Mar2017; Last   Rx:23Mar2017 Ordered   4  Cephalexin 500 MG Oral Capsule; TAKE 1 CAPSULE 3 TIMES DAILY; Therapy: 21CGL3334 to (Evaluate:30Jun2017)  Requested for: 50IDR6137; Last   RL:35LAH2617 Ordered   5  Disposable Underpads 30x36 Miscellaneous; change upto 8 times a day;    Therapy: 73BHT7196 to (Last DV:35IHK8139)  Requested for: 10VHN4202 Ordered   6  Ferrous Sulfate 325 (65 Fe) MG Oral Tablet; TAKE 1 TABLET 2 TIMES DAILY AFTER   MEALS; Therapy: 67BSF5829 to (Evaluate:70Xxy3297)  Requested for: 03Aug2017; Last   Rx:08Nov2016 Ordered   7  Gauze Pads 4X4 Pad; use to cover wound change daily; Therapy: 57WMF3165 to (Last Rx:90Iqa1353)  Requested for: 83JUJ4484 Ordered   8  Glucerna Oral Liquid; drink 3 cans per day; Therapy: 18DMC1454 to (Last Rx:17May2017)  Requested for: 60Kxk6650 Ordered   9  Glucerna Oral Liquid; drink 3 cans per day; Therapy: 53IRW3740 to (Last Rx:05Jan2017)  Requested for: 18BHO1951 Ordered   10  Ibuprofen 800 MG Oral Tablet; TAKE ONE TABLET BY MOUTH EVERY DAY AS NEEDED; Therapy: 14XCB9377 to (Lucille Hughes)  Requested for: 25Sep2017; Last    Rx:25Sep2017 Ordered   11  Lidocaine HCl GEL; 2%gel applied to wound/s prior to debridement  at the [place] [address] for pain; Therapy: ([phone]) to Recorded   12  Morphine Sulfate ER 60 MG Oral Tablet Extended Release; take 1 po every 12 hours; Therapy: 88KHD8508 to (Last Rx:08Nov2016) Ordered   13  Omeprazole 20 MG Oral Capsule Delayed Release; TAKE ONE CAPSULE BY MOUTH    EVERY NIGHT AT BEDTIME; Therapy: 33FSB5800 to (Evaluate:19Sep2017)  Requested for: 23Mar2017; Last    Rx:23Mar2017 Ordered   14  OxyCODONE HCl - 20 MG Oral Tablet; Take 1 pill every 6 hours if needed for pain; Therapy: 82ZGT5758 to (Evaluate:12Nov2017); Last Rx:13Oct2017 Ordered   15  Premier Urostomy 2-1/2 Pouch Miscellaneous; USE AS DIRECTED; Therapy: 64FYI3473 to (Last Rx:13May2016)  Requested for: 09GZX4091 Ordered   16  Premium Skin Barrier Miscellaneous; USE AS DIRECTED; Therapy: 84DVK9226 to (Last Rx:13May2016)  Requested for: 38EJU5987 Ordered   17  Santyl 250 UNIT/GM External Ointment; APPLY TO AFFECTED AREA(S) ONCE DAILY AS    DIRECTED; Therapy: 67Hvp0726 to (Last [facility])  Requested for: 10IDT3710 Ordered   18  Skin Prep Wipes Miscellaneous; USE AS DIRECTED; Therapy: 91ZBK6561 to (Last Rx:13May2016)  Requested for: 84WFF6224 Ordered   19  SM Rolled Gauze 3x4 1yd Miscellaneous; USE AS DIRECTED; Therapy: 92UKF7247 to (Last Rx:13May2016)  Requested for: 14TDU1204 Ordered   20  Surgical Dressing 5X9 Pad; USE AS DIRECTED; Therapy: 59QMU1268 to (Last Rx:13May2016)  Requested for: 11UDU0485 Ordered   21  Tegaderm + Pad 2x2-3/4 Miscellaneous; USE AS DIRECTED; Therapy: 56HLE5566 to (Last Rx:13May2016)  Requested for: 25EYT0242 Ordered   22  Underpads Extra Large Miscellaneous; change twice daily; Therapy: 07PRL1760 to (Last Rx:07Jun2017)  Requested for: 35Tvo1814 Ordered   23  Underpads Large MISC; 4 pads per month; Therapy: 78CTA3440 to (Last IJ:77NFY5793)  Requested for: 59AZS9004 Ordered   24  Underpads Miscellaneous; change twice weekly; Therapy: 61WRV1184 to (Last Rx:17May2017)  Requested for: 51POP7599 Ordered   25  Vitamin C 500 MG Oral Tablet; take 1 tablet by mouth twice daily; Therapy: 65IFE6509 to (Evaluate:21Mar2017)  Requested for: 79TSF7628; Last    Rx:02Obl6212 Ordered   26  Vitamin D3 5000 UNIT Oral Tablet; 1 Tab daily; Therapy: 48WWQ6194 to (Last Rx:22Sep2016)  Requested for: 01VNE0792 Ordered    Allergies  1   No Known Drug Allergies    Vitals   Recorded: 14GKA1503 10:18AM   BP Comments unobtainable   Height Unobtainable Yes   Weight Unobtainable Yes     Future Appointments    Date/Time Provider Specialty Site   11/10/2017 01:00 PM GIUSEPPE [place]     Signatures   Electronically signed by : GIUSEPPE Dominguez ; Oct 25 2017 11:50AM EST                       (Author)

## 2017-10-31 ENCOUNTER — GENERIC CONVERSION - ENCOUNTER (OUTPATIENT)
Dept: OTHER | Facility: OTHER | Age: 57
End: 2017-10-31

## 2017-11-02 ENCOUNTER — GENERIC CONVERSION - ENCOUNTER (OUTPATIENT)
Dept: OTHER | Facility: OTHER | Age: 57
End: 2017-11-02

## 2017-11-06 ENCOUNTER — GENERIC CONVERSION - ENCOUNTER (OUTPATIENT)
Dept: OTHER | Facility: OTHER | Age: 57
End: 2017-11-06

## 2017-11-08 ENCOUNTER — GENERIC CONVERSION - ENCOUNTER (OUTPATIENT)
Dept: OTHER | Facility: OTHER | Age: 57
End: 2017-11-08

## 2017-11-10 ENCOUNTER — GENERIC CONVERSION - ENCOUNTER (OUTPATIENT)
Dept: OTHER | Facility: OTHER | Age: 57
End: 2017-11-10

## 2017-11-10 ENCOUNTER — ALLSCRIPTS OFFICE VISIT (OUTPATIENT)
Dept: OTHER | Facility: OTHER | Age: 57
End: 2017-11-10

## 2017-11-10 DIAGNOSIS — Z98.890 OTHER SPECIFIED POSTPROCEDURAL STATES: ICD-10-CM

## 2017-11-10 DIAGNOSIS — L89.324 STAGE IV PRESSURE ULCER OF LEFT BUTTOCK (HCC): ICD-10-CM

## 2017-11-10 DIAGNOSIS — L89.154 STAGE IV PRESSURE ULCER OF SACRAL REGION (HCC): ICD-10-CM

## 2017-11-10 LAB — HBA1C MFR BLD HPLC: 5 %

## 2017-11-13 ENCOUNTER — GENERIC CONVERSION - ENCOUNTER (OUTPATIENT)
Dept: OTHER | Facility: OTHER | Age: 57
End: 2017-11-13

## 2017-11-30 ENCOUNTER — APPOINTMENT (OUTPATIENT)
Dept: WOUND CARE | Facility: HOSPITAL | Age: 57
End: 2017-11-30
Payer: MEDICARE

## 2017-11-30 PROCEDURE — 99213 OFFICE O/P EST LOW 20 MIN: CPT | Performed by: FAMILY MEDICINE

## 2017-11-30 PROCEDURE — 11042 DBRDMT SUBQ TIS 1ST 20SQCM/<: CPT | Performed by: FAMILY MEDICINE

## 2017-12-06 ENCOUNTER — GENERIC CONVERSION - ENCOUNTER (OUTPATIENT)
Dept: OTHER | Facility: OTHER | Age: 57
End: 2017-12-06

## 2017-12-06 ENCOUNTER — HOSPITAL ENCOUNTER (OUTPATIENT)
Dept: RADIOLOGY | Facility: HOSPITAL | Age: 57
Discharge: HOME | End: 2017-12-06
Attending: FAMILY MEDICINE
Payer: MEDICARE

## 2017-12-06 ENCOUNTER — TRANSCRIBE ORDERS (OUTPATIENT)
Dept: ADMINISTRATIVE | Facility: HOSPITAL | Age: 57
End: 2017-12-06

## 2017-12-06 DIAGNOSIS — L89.893 DECUBITUS ULCER OF LEFT FOOT, STAGE 3 (HCC): ICD-10-CM

## 2017-12-06 DIAGNOSIS — L89.893 DECUBITUS ULCER OF LEFT FOOT, STAGE 3 (HCC): Primary | ICD-10-CM

## 2017-12-06 PROCEDURE — 73560 X-RAY EXAM OF KNEE 1 OR 2: CPT

## 2017-12-07 ENCOUNTER — APPOINTMENT (OUTPATIENT)
Dept: WOUND CARE | Facility: HOSPITAL | Age: 57
End: 2017-12-07
Payer: MEDICARE

## 2017-12-07 ENCOUNTER — GENERIC CONVERSION - ENCOUNTER (OUTPATIENT)
Dept: OTHER | Facility: OTHER | Age: 57
End: 2017-12-07

## 2017-12-07 PROCEDURE — 11045 DBRDMT SUBQ TISS EACH ADDL: CPT | Performed by: FAMILY MEDICINE

## 2017-12-07 PROCEDURE — 11043 DBRDMT MUSC&/FSCA 1ST 20/<: CPT | Performed by: FAMILY MEDICINE

## 2017-12-07 PROCEDURE — 99214 OFFICE O/P EST MOD 30 MIN: CPT | Performed by: FAMILY MEDICINE

## 2017-12-07 PROCEDURE — 11042 DBRDMT SUBQ TIS 1ST 20SQCM/<: CPT | Performed by: FAMILY MEDICINE

## 2017-12-21 ENCOUNTER — APPOINTMENT (OUTPATIENT)
Dept: WOUND CARE | Facility: HOSPITAL | Age: 57
End: 2017-12-21
Payer: MEDICARE

## 2017-12-21 PROCEDURE — 11042 DBRDMT SUBQ TIS 1ST 20SQCM/<: CPT | Performed by: FAMILY MEDICINE

## 2018-01-10 ENCOUNTER — APPOINTMENT (OUTPATIENT)
Dept: WOUND CARE | Facility: HOSPITAL | Age: 58
End: 2018-01-10
Payer: MEDICARE

## 2018-01-10 PROCEDURE — 99214 OFFICE O/P EST MOD 30 MIN: CPT | Performed by: FAMILY MEDICINE

## 2018-01-10 NOTE — MISCELLANEOUS
Dear Ashley Burger,  The Physicians and staff of [facility]  wish to remind and invite you to take full advantage of the new preventative service offered by Medicare AT NO COST TO YOU! The name of the service is the [address]    This new service is NOT to replace  any routine of scheduled visits for acute illness, chronic disease management or office check-ups  **The Annual Wellness Visit is NOT a physical**  These new free to you annual visits are designed to address any  PREVENTATIVE SERVICES that you may require and DESERVE as well as identify any other specific risk factors that may exist relating to your overall health and well-being  Additionally, you will receive a [address] to address any needed  preventative services or health services developed around your own specific needs  At the time of this visit you may receive referrals for any needed services in regards to specific problems, social/medical concerns or health risk factors  You are entitled  to the free Annual Wellness Visit every 12 months with no out of pocket cost to you for the services performed in our office  If you participate with Medicare Advantage Plan (Medicare Blue, [employer], [employer] or other [employer]) you ARE eligible for this service  CALL TODAY AND SCHEDULE YOUR APPOINTMENT!  [phone]       Sincerely,            Corine Dorinda,    [address] y personal [facility] sydnee recordarle e invitarle a aprovechar al jayme el nuevo servicio preventivo ofrecido  por Medicare SIN COSTO ALGUNO PARA USTED  [place] del servicio es la "[place]"  Dominga nuevo servicio NO debe reemplazar ninguna rutina de visitas programadas por enfermedad graves, manejo de enfermedades  crónicas o chequeos de oficina      ** La visita anual de bienestar NO es un examen físico **    Estas nuevas visitas anuales "gratuitas para usted"  están diseñadas para atender cualquier SERVICIO PREVENTATIVO que usted pueda requerir y [employer] identificar otros factores de riesgo específicos que puedan existir relacionados con shen sophie y bienestar general   [place]  un PLAN DE ACCIÓN DE SOPHIE PERSONALIZADA para atender cualquier servicio preventivo o servicios de sophie necesarios desarrollados en torno a jaime propias necesidades específicas  En el momento de esta visita, puede recibir referencias para cualquier  servicio necesario en relación con problemas específicos, preocupaciones sociales / médicas o factores de riesgo para la sophie  Usted tiene derecho a la Visita Anual de Bienestar gratuita cada 12 meses sin ningún costo de bolsillo para los  servicios que se realizan en nuestra oficina  Si participa con el Plan Medicare Advantage (Medicare Blue, [employer], [employer] u otros planes de [employer]),  es elegible para ion servicio  ALEXANDER PEREZ PARA PROGRAMAR SHEN LEA! !  [phone]       Sinceramente,

## 2018-01-11 NOTE — RESULT NOTES
Verified Results  (1) MICROALBUMIN CREATININE RATIO, RANDOM URINE 20Nzi1374 04:42PM Jose Alfredo Minus     Test Name Result Flag Reference   MICROALBUMIN/ CREAT R 123 mg/g creatinine H 0-30   MICROALBUMIN,URINE 114 0 mg/L H 0 0-20 0   CREATININE URINE 92 7 mg/dL         Discussion/Summary   please let pt know his urine shows he is spilling protein in his urine, which should not be happening  Once he is better from this last infection he had we will need to repeat and recheck his kidney   Nothing else for him to do at this point      Signatures   Electronically signed by : GIUSEPPE Ellison ; Jan 1 2017  7:52PM EST                       (Author)

## 2018-01-11 NOTE — MISCELLANEOUS
Message   Date: 02 Nov 2017 8:37 AM EST, Recorded By: David Morales For: Acosta Vasquez: Denisha Howe, Care Giver   Phone: [phone] (Work)   Reason: Care Coordination   Denisha Howe with [facility] calling to report greenish tinged discharge on dressing over sacral wound  States drainage also has foul "pseudomonas smell"  Denies that patient has fever, body aches/chills, or any other signs or symptoms of infection  Explained that greenish tint is most likely from calcium alginate  Requesting that they be able to switch to calcium alginate with silver  an reinforce dressing with ABD to help with drainage  Verbal ok given to switch to calcium alginate with silver and reinforce dressing as needed  Will discuss with Dr Payton Waldron and call back with any new or additional orders  Active Problems    1  Anemia (285 9) (D64 9)   2  Benign essential hypertension (401 1) (I10)   3  Cellulitis of right lower extremity (682 6) (L03 115)   4  Cellulitis of upper arm (682 3) (L03 119)   5  Chronic ulcer of sacral region (707 8) (L98 429)   6  Colon cancer screening (V76 51) (Z12 11)   7  Constipation (564 00) (K59 00)   8  Decubitus ulcer of left heel, stage 3 (382 95,634 21) (F57 711)   9  Decubitus ulcer of left heel, unstageable (707 07,707 25) (L89 620)   10  Decubitus ulcer of left ischium, stage 4 (148 51,034 83) (L89 324)   11  Decubitus ulcer of left knee, stage 3 (707 09,707 23) (G57 307)   12  Decubitus ulcer of sacral region, stage 4 (707 03,707 24) (L89 154)   13  Dehiscence of surgical wound (998 32) (T81 31XA)   14  Depression screening (V79 0) (Z13 89)   15  Diabetic eye exam (V72 0,250 00) (E11 9,Z01 00)   16  DM type 2 (diabetes mellitus, type 2) (250 00) (E11 9)   17  Dyslipidemia (272 4) (E78 5)   18  Dysuria (788 1) (R30 0)   19  Edema (782 3) (R60 9)   20  Encounter for diabetic foot exam (250 00) (E11 9)   21  Mass of right upper extremity (782 2) (R22 31)   22   Need for immunization against influenza (V04 81) (Z23)   23  Need for prophylactic vaccination and inoculation against influenza (V04 81) (Z23)   24  Need for Tdap vaccination (V06 1) (Z23)   25  Neurogenic bladder (596 54) (N31 9)   26  Open wound of hip and thigh (890 0) (S71 009A,S71 109A)   27  Open wound of left knee without complication (626 3) (K97 458F)   28  Open wound of right upper arm, initial encounter (880 03) (S41 101A)   29  Other insomnia (780 52) (G47 09)   30  Pain in joint of right shoulder (719 41) (M25 511)   31  Paraplegic spinal paralysis (344 1) (G82 20)   32  Recurrent UTI (urinary tract infection) (599 0) (N39 0)   33  Screening for depression (V79 0) (Z13 89)   34  Status post flap graft (V45 89) (Z98 890)   35  Thrombocytosis (238 71) (D47 3)   36  Unspecified open wound, left ankle, sequela (906 1) (S91 002S)   37  Urinary tract infection (599 0) (N39 0)   38  Visit for pre-operative examination (V72 84) (Z01 818)    Current Meds   1  Accu-Chek FastClix Lancets Miscellaneous; TEST TWICE DAILY AS DIRECTED; Therapy: 06HEE8991 to ([phone])  Requested for: 06NJD3345; Last   Rx:08Nov2016 Ordered   2  Accu-Chek SmartView In [employer]; test twice daily; Therapy: 58PSB9857 to ([phone])  Requested for: 23LWV7944; Last   Rx:07Nov2016 Ordered   3  Aspirin EC Low Dose 81 MG Oral Tablet Delayed Release; take 1 tablet by mouth every   day; Therapy: 06XWS4414 to (Evaluate:41Fer6245)  Requested for: 23Mar2017; Last   Rx:23Mar2017 Ordered   4  Cephalexin 500 MG Oral Capsule (Keflex); TAKE 1 CAPSULE 3 TIMES DAILY; Therapy: 13RTH9344 to (Evaluate:30Jun2017)  Requested for: 11DJR0477; Last   KT:67SFP2204 Ordered   5  Disposable Underpads 30"x36" Miscellaneous; change upto 8 times a day; Therapy: 15QNB8870 to (Last Rx:25Ian0632)  Requested for: 35BSN8330 Ordered   6  Ferrous Sulfate 325 (65 Fe) MG Oral Tablet; TAKE 1 TABLET 2 TIMES DAILY AFTER   MEALS;    Therapy: 56XOF8779 to (Evaluate:03Fno0218) Requested for: 58Qfv3637; Last   Rx:08Nov2016 Ordered   7  Gauze Pads 4"X4" Pad; use to cover wound change daily; Therapy: 79PFV1558 to (Last Rx:54Vug9127)  Requested for: 49BYN8906 Ordered   8  Glucerna Oral Liquid; drink 3 cans per day; Therapy: 51MRH9353 to (Last Rx:17May2017)  Requested for: 81Axj8814 Ordered   9  Glucerna Oral Liquid; drink 3 cans per day; Therapy: 76EAO2197 to (Last Rx:05Jan2017)  Requested for: 89DBP2028 Ordered   10  Ibuprofen 800 MG Oral Tablet; TAKE ONE TABLET BY MOUTH EVERY DAY AS    NEEDED; Therapy: 98TZI2224 to (Marcelina Warren)  Requested for: 60Ulp1744; Last    Rx:25Sep2017 Ordered   11  Lidocaine HCl GEL; 2%gel applied to wound/s prior to debridement  at the [address] [place] [address] for pain; Therapy: (Topher Bernal) to Recorded   12  Morphine Sulfate ER 60 MG Oral Tablet Extended Release (MS Contin); take 1 po every    12 hours; Therapy: 93HMB4600 to (Last Rx:08Nov2016) Ordered   13  Omeprazole 20 MG Oral Capsule Delayed Release; TAKE ONE CAPSULE BY MOUTH    EVERY NIGHT AT BEDTIME; Therapy: 55BXD6050 to (Evaluate:27Vqw1896)  Requested for: 23Mar2017; Last    Rx:23Mar2017 Ordered   14  OxyCODONE HCl - 20 MG Oral Tablet; Take 1 pill every 6 hours if needed for pain; Therapy: 42DWG9907 to (Evaluate:12Nov2017); Last Rx:83Hrg3420 Ordered   15  Premier Urostomy 2-1/2" Pouch Miscellaneous; USE AS DIRECTED; Therapy: 36IFG3645 to (Last Rx:13May2016)  Requested for: 02XXS0870 Ordered   16  Premium Skin Barrier Miscellaneous; USE AS DIRECTED; Therapy: 00CDD8171 to (Last Rx:13May2016)  Requested for: 29UQC8912 Ordered   17  Santyl 250 UNIT/GM External Ointment; APPLY TO AFFECTED AREA(S) ONCE DAILY    AS DIRECTED; Therapy: 08Otz6206 to (Last Steff Garcia)  Requested for: 09PGX2937 Ordered   18  Skin Prep Wipes Miscellaneous; USE AS DIRECTED; Therapy: 32VIT5038 to (Last Rx:42Znj1034)  Requested for: 36NRQ8109 Ordered   19  SM Rolled Gauze 3"x4  1yd Miscellaneous; USE AS DIRECTED; Therapy: 29CRN6206 to (Last Rx:13May2016)  Requested for: 88VXK3356 Ordered   20  Surgical Dressing 5"X9" Pad; USE AS DIRECTED; Therapy: 78QBX8209 to (Last Rx:13May2016)  Requested for: 75HIV5980 Ordered   21  Tegaderm + Pad 2"x2-3/4" Miscellaneous; USE AS DIRECTED; Therapy: 26ESH7599 to (Last Rx:13May2016)  Requested for: 63KXA5899 Ordered   22  Underpads Extra Large Miscellaneous; change twice daily; Therapy: 44IUP9636 to (Last Rx:07Jun2017)  Requested for: 07Jun2017 Ordered   23  Underpads Large MISC; 4 pads per month; Therapy: 48UYH2869 to (Last SE:23HVK2931)  Requested for: 38AQU5296 Ordered   24  Underpads Miscellaneous; change twice weekly; Therapy: 67PYP7622 to (Last Rx:17May2017)  Requested for: 80WBB8994 Ordered   25  Vitamin C 500 MG Oral Tablet; take 1 tablet by mouth twice daily; Therapy: 99MKZ9023 to (Evaluate:21Mar2017)  Requested for: 83TMU9563; Last    Rx:11Pqu1527 Ordered   26  Vitamin D3 5000 UNIT Oral Tablet; 1 Tab daily; Therapy: 56FEO7462 to (Last Rx:22Sep2016)  Requested for: 34JAD3291 Ordered    Allergies    1   No Known Drug Allergies    Signatures   Electronically signed by : Ramin Rosas RN; Nov 2 2017  8:43AM EST                       (Author)

## 2018-01-11 NOTE — MISCELLANEOUS
Message     Date: 30 May 2017 1:52 PM EST, Recorded By: Brian Whitman   Calling For: Zeinab Reveal: Deion   Phone: [phone] (Work)   Reason: Care Coordination   Deion with  [facility] calling to "make sure Dr Jeanie Jacobsen is aware of surgical wound dehiscense"  Robbie Shaffer made aware that wound was addressed with Molly Smith RN with [facility] on Friday  Per Dr Jeanie Jacobsen, they are to continue with calcium alginate dressings daily to wound  Verbalized understanding  Active Problems    1  Anemia (285 9) (D64 9)   2  Benign essential hypertension (401 1) (I10)   3  Cellulitis of right lower extremity (682 6) (L03 115)   4  Cellulitis of upper arm (682 3) (L03 119)   5  Chronic ulcer of sacral region (707 8) (L98 499)   6  Colon cancer screening (V76 51) (Z12 11)   7  Constipation (564 00) (K59 00)   8  Decubitus ulcer of left heel, stage 3 (569 97,047 78) (H57 496)   9  Decubitus ulcer of left heel, unstageable (707 07,707 25) (L89 620)   10  Decubitus ulcer of left ischium, stage 4 (878 30,572 10) (L89 324)   11  Decubitus ulcer of sacral region, stage 4 (707 03,707 24) (L89 154)   12  Depression screening (V79 0) (Z13 89)   13  Diabetic eye exam (V72 0,250 00) (E11 9,Z01 00)   14  DM type 2 (diabetes mellitus, type 2) (250 00) (E11 9)   15  Dyslipidemia (272 4) (E78 5)   16  Edema (782 3) (R60 9)   17  Encounter for diabetic foot exam (250 00) (E11 9)   18  Mass of right upper extremity (782 2) (R22 31)   19  Need for immunization against influenza (V04 81) (Z23)   20  Need for prophylactic vaccination and inoculation against influenza (V04 81) (Z23)   21  Need for Tdap vaccination (V06 1) (Z23)   22  Neurogenic bladder (596 54) (N31 9)   23  Open wound of hip and thigh (890 0) (S71 009A,S71 109A)   24  Open wound of left knee without complication (542 5) (P91 289D)   25  Open wound of right upper arm, initial encounter (880 03) (S41 101A)   26  Other insomnia (780 52) (G47 09)   27   Pain in joint of right shoulder (319 41) (M25 511)   28  Paraplegic spinal paralysis (344 1) (G82 20)   29  Screening for depression (V79 0) (Z13 89)   30  Thrombocytosis (238 71) (D47 3)   31  Unspecified open wound, left ankle, sequela (906 1) (S91 002S)   32  Urinary tract infection (599 0) (N39 0)   33  Visit for pre-operative examination (V72 84) (Z01 818)    Current Meds   1  Accu-Chek FastClix Lancets Miscellaneous; TEST TWICE DAILY AS DIRECTED; Therapy: 53LBB7059 to [phone]  Requested for: 85ZGN1423; Last   Rx:08Nov2016 Ordered   2  Accu-Chek SmartView In [employer]; test twice daily; Therapy: 06DQN4324 to [phone]  Requested for: 55MTN0786; Last   Rx:07Nov2016 Ordered   3  Aspirin EC Low Dose 81 MG Oral Tablet Delayed Release; take 1 tablet by mouth every   day; Therapy: 29XBO1569 to (Evaluate:97Pys7658)  Requested for: 23Mar2017; Last   Rx:23Mar2017 Ordered   4  Disposable Underpads 30"x36" Miscellaneous; change upto 8 times a day; Therapy: 71ODY6325 to (Last Rx:17May2017)  Requested for: 08VBW8396 Ordered   5  Ferrous Sulfate 325 (65 Fe) MG Oral Tablet; TAKE 1 TABLET 2 TIMES DAILY AFTER   MEALS; Therapy: 11ZYF4447 to (Evaluate:02Ycu1756)  Requested for: 41ZMT8700; Last   Rx:08Nov2016 Ordered   6  Gauze Pads 4"X4" Pad; use to cover wound change daily; Therapy: 78CMI7945 to (Last Rx:88Jwk3288)  Requested for: 03RAV7747 Ordered   7  Glucerna Oral Liquid; drink 3 cans per day; Therapy: 87SAJ1258 to (Last Rx:17May2017)  Requested for: 58FHV5252 Ordered   8  Glucerna Oral Liquid; drink 3 cans per day; Therapy: 90VKH1887 to (Last Rx:05Jan2017)  Requested for: 64ORJ4752 Ordered   9  Ibuprofen 800 MG Oral Tablet; TAKE 1 TABLET BY MOUTH EVERY DAY AS NEEDED; Therapy: 39FSC0120 to (Evaluate:25Apr2017)  Requested for: 46TZD0146; Last   Rx:79Ksz0036 Ordered   10  Lidocaine HCl GEL; 2%gel applied to wound/s prior to debridement  at the [place] [address] for pain;     Therapy: (Giat Velasco) to Recorded   11  Morphine Sulfate ER 60 MG Oral Tablet Extended Release (MS Contin); take 1 po every    12 hours; Therapy: 52TGT3915 to (Last Rx:08Nov2016) Ordered   12  Omeprazole 20 MG Oral Capsule Delayed Release; TAKE ONE CAPSULE BY MOUTH    EVERY NIGHT AT BEDTIME; Therapy: 12MDW6146 to (Evaluate:19Sep2017)  Requested for: 23Mar2017; Last    Rx:23Mar2017 Ordered   13  OxyCODONE HCl - 20 MG Oral Tablet; Take 1 pill every 6 hours if needed for pain; Therapy: 80OSM4280 to (Evaluate:09Jun2017); Last Rx:37Rdw2438 Ordered   14  Premier Urostomy 2-1/2" Pouch Miscellaneous; USE AS DIRECTED; Therapy: 14LUH7043 to (Last Rx:13May2016)  Requested for: 87HRP5657 Ordered   15  Premium Skin Barrier Miscellaneous; USE AS DIRECTED; Therapy: 79UNF2400 to (Last Rx:13May2016)  Requested for: 15ZOW0469 Ordered   16  Santyl 250 UNIT/GM External Ointment; APPLY TO AFFECTED AREA(S) ONCE DAILY    AS DIRECTED; Therapy: 34Ari1459 to (Last Sabas Al)  Requested for: 00DUP4751 Ordered   17  Skin Prep Wipes Miscellaneous; USE AS DIRECTED; Therapy: 49SHN4404 to (Last Rx:13May2016)  Requested for: 50VAN3445 Ordered   18  SM Rolled Gauze 3"x4 1yd Miscellaneous; USE AS DIRECTED; Therapy: 66DQH1243 to (Last Rx:13May2016)  Requested for: 54FUQ1615 Ordered   19  Surgical Dressing 5"X9" Pad; USE AS DIRECTED; Therapy: 52AWX5040 to (Last Rx:13May2016)  Requested for: 09MPZ1967 Ordered   20  Tegaderm + Pad 2"x2-3/4" Miscellaneous; USE AS DIRECTED; Therapy: 73VJG4014 to (Last Rx:13May2016)  Requested for: 48NWA4367 Ordered   21  Underpads Large MISC; 4 pads per month; Therapy: 17FUG0886 to (Last YB:25KCL2976)  Requested for: 41LHB3991 Ordered   22  Underpads Miscellaneous; change twice weekly; Therapy: 03IEB0269 to (Last Rx:40Xap7649)  Requested for: 91BAT1115 Ordered   23  Vitamin C 500 MG Oral Tablet; take 1 tablet by mouth twice daily;     Therapy: 48YSC3548 to (Evaluate:21Mar2017)  Requested for: 74SOZ6610; Last Rx: 98NON5046 Ordered   24  Vitamin D3 5000 UNIT Oral Tablet; 1 Tab daily; Therapy: 12KEL0484 to (Last Rx:23Pic9967)  Requested for: 74GYW8102 Ordered    Allergies    1   No Known Drug Allergies    Signatures   Electronically signed by : Zackery Greene RN; May 30 2017  1:59PM EST                       (Author)

## 2018-01-12 VITALS — BODY MASS INDEX: 28.25 KG/M2 | HEIGHT: 67 IN | WEIGHT: 180 LBS

## 2018-01-12 NOTE — MISCELLANEOUS
Message   Date: 18 Oct 2017 3:12 PM EST, Recorded By: Hermelindo Davila For: Mei Miguel Angel: Simarosy Sangeetha, Care Giver   Phone: [phone]   Visitin Nurse, Tram Blankenship called and stated that patient had would the were open and needed to be packed  Tram Blankenship stated that the wound in the sacral area measures 2cm width, 1 5cm length, 1 5cm depth  She also stated that there is an area with the sutures are keep the sided of the incision together, but all other areas look to be intact  Spoke with Dr Braulio Linares and he stated to pack it with calcium alginate  Informed Nicol and she stated understanding of using calcium alginate to pack the wound  Will schedule patient for Wed  10/25/2017 @ 9 am for appointment  Active Problems    1  Anemia (285 9) (D64 9)   2  Benign essential hypertension (401 1) (I10)   3  Cellulitis of right lower extremity (682 6) (L03 115)   4  Cellulitis of upper arm (682 3) (L03 119)   5  Chronic ulcer of sacral region (707 8) (L98 429)   6  Colon cancer screening (V76 51) (Z12 11)   7  Constipation (564 00) (K59 00)   8  Decubitus ulcer of left heel, stage 3 (343 21,796 68) (N09 173)   9  Decubitus ulcer of left heel, unstageable (707 07,707 25) (L89 620)   10  Decubitus ulcer of left ischium, stage 4 (359 78,888 63) (L89 324)   11  Decubitus ulcer of left knee, stage 3 (707 09,707 23) (B60 915)   12  Decubitus ulcer of sacral region, stage 4 (707 03,707 24) (L89 154)   13  Dehiscence of surgical wound (998 32) (T81 31XA)   14  Depression screening (V79 0) (Z13 89)   15  Diabetic eye exam (V72 0,250 00) (E11 9,Z01 00)   16  DM type 2 (diabetes mellitus, type 2) (250 00) (E11 9)   17  Dyslipidemia (272 4) (E78 5)   18  Dysuria (788 1) (R30 0)   19  Edema (782 3) (R60 9)   20  Encounter for diabetic foot exam (250 00) (E11 9)   21  Mass of right upper extremity (782 2) (R22 31)   22  Need for immunization against influenza (V04 81) (Z23)   23   Need for prophylactic vaccination and inoculation against influenza (V04 81) (Z23)   24  Need for Tdap vaccination (V06 1) (Z23)   25  Neurogenic bladder (596 54) (N31 9)   26  Open wound of hip and thigh (890 0) (S71 009A,S71 109A)   27  Open wound of left knee without complication (686 4) (E39 822K)   28  Open wound of right upper arm, initial encounter (880 03) (S41 101A)   29  Other insomnia (780 52) (G47 09)   30  Pain in joint of right shoulder (719 41) (M25 511)   31  Paraplegic spinal paralysis (344 1) (G82 20)   32  Recurrent UTI (urinary tract infection) (599 0) (N39 0)   33  Screening for depression (V79 0) (Z13 89)   34  Status post flap graft (V45 89) (Z98 890)   35  Thrombocytosis (238 71) (D47 3)   36  Unspecified open wound, left ankle, sequela (906 1) (S91 002S)   37  Urinary tract infection (599 0) (N39 0)   38  Visit for pre-operative examination (V72 84) (Z01 818)    Current Meds   1  Accu-Chek FastClix Lancets Miscellaneous; TEST TWICE DAILY AS DIRECTED; Therapy: 60UHQ4077 to (22 829038)  Requested for: 64KAZ0247; Last   Rx:08Nov2016 Ordered   2  Accu-Chek SmartView In [employer]; test twice daily; Therapy: 52JSZ1142 to (22 829038)  Requested for: 04ALY4938; Last   Rx:07Nov2016 Ordered   3  Aspirin EC Low Dose 81 MG Oral Tablet Delayed Release; take 1 tablet by mouth every   day; Therapy: 82TDN9700 to (Evaluate:06Rht8053)  Requested for: 23Mar2017; Last   Rx:23Mar2017 Ordered   4  Cephalexin 500 MG Oral Capsule (Keflex); TAKE 1 CAPSULE 3 TIMES DAILY; Therapy: 46QWT6588 to (Evaluate:30Jun2017)  Requested for: 48YIZ8850; Last   RW:90ETL7123 Ordered   5  Disposable Underpads 30"x36" Miscellaneous; change upto 8 times a day; Therapy: 15OYS5403 to (Last Rx:03Nvs4846)  Requested for: 31XZB2477 Ordered   6  Ferrous Sulfate 325 (65 Fe) MG Oral Tablet; TAKE 1 TABLET 2 TIMES DAILY AFTER   MEALS; Therapy: 32UQA1640 to (Evaluate:66Twp4316)  Requested for: 72Fid7358; Last   Rx:24Jao7656 Ordered   7   Gauze Pads 4"X4" Pad; use to cover wound change daily; Therapy: 76NTB5484 to (Last Rx:73Abh7084)  Requested for: 80UNI3912 Ordered   8  Glucerna Oral Liquid; drink 3 cans per day; Therapy: 21ZEX1055 to (Last Rx:17May2017)  Requested for: 24Sac9321 Ordered   9  Glucerna Oral Liquid; drink 3 cans per day; Therapy: 79TBA6022 to (Last Rx:05Jan2017)  Requested for: 34NDY5364 Ordered   10  Ibuprofen 800 MG Oral Tablet; TAKE ONE TABLET BY MOUTH EVERY DAY AS    NEEDED; Therapy: 42INY4131 to (Carnella Chroman)  Requested for: 96Niw4969; Last    Rx:34Wwv3235 Ordered   11  Lidocaine HCl GEL; 2%gel applied to wound/s prior to debridement  at the [place] [address] for pain; Therapy: (Adri Moran) to Recorded   12  Morphine Sulfate ER 60 MG Oral Tablet Extended Release (MS Contin); take 1 po every    12 hours; Therapy: 60ZSS7895 to (Last Rx:08Nov2016) Ordered   13  Omeprazole 20 MG Oral Capsule Delayed Release; TAKE ONE CAPSULE BY MOUTH    EVERY NIGHT AT BEDTIME; Therapy: 03MMA5113 to (Evaluate:19Sep2017)  Requested for: 23Mar2017; Last    Rx:23Mar2017 Ordered   14  OxyCODONE HCl - 20 MG Oral Tablet; Take 1 pill every 6 hours if needed for pain; Therapy: 15TQK8042 to (Evaluate:12Nov2017); Last Rx:52Sdt6353 Ordered   15  Premier Urostomy 2-1/2" Pouch Miscellaneous; USE AS DIRECTED; Therapy: 53ASP8397 to (Last Rx:13May2016)  Requested for: 93RNN0647 Ordered   16  Premium Skin Barrier Miscellaneous; USE AS DIRECTED; Therapy: 17HZM9618 to (Last Rx:13May2016)  Requested for: 68SXV3210 Ordered   17  Santyl 250 UNIT/GM External Ointment; APPLY TO AFFECTED AREA(S) ONCE DAILY    AS DIRECTED; Therapy: 12Ziq6512 to (Last Susi Krbrenton)  Requested for: 94XXY7031 Ordered   18  Skin Prep Wipes Miscellaneous; USE AS DIRECTED; Therapy: 36STN4632 to (Last Rx:13May2016)  Requested for: 79SBR2513 Ordered   19  SM Rolled Gauze 3"x4 1yd Miscellaneous; USE AS DIRECTED;     Therapy: 20TSS3539 to (Last Rx:13May2016) Requested for: 21HAF3851 Ordered   20  Surgical Dressing 5"X9" Pad; USE AS DIRECTED; Therapy: 75OZM3583 to (Last Rx:13May2016)  Requested for: 47TIJ6283 Ordered   21  Tegaderm + Pad 2"x2-3/4" Miscellaneous; USE AS DIRECTED; Therapy: 45VHX9047 to (Last Rx:13May2016)  Requested for: 66EKB3970 Ordered   22  Underpads Extra Large Miscellaneous; change twice daily; Therapy: 53BPF9681 to (Last Rx:07Jun2017)  Requested for: 07Jun2017 Ordered   23  Underpads Large MISC; 4 pads per month; Therapy: 22WTB4241 to (Last [place]:08VDG3107)  Requested for: 07ETX0723 Ordered   24  Underpads Miscellaneous; change twice weekly; Therapy: 18IQR5109 to (Last Rx:23Kyc4537)  Requested for: 01OID7841 Ordered   25  Vitamin C 500 MG Oral Tablet; take 1 tablet by mouth twice daily; Therapy: 94THD8780 to (Evaluate:21Mar2017)  Requested for: 10HIJ2638; Last    Rx:87Owj0165 Ordered   26  Vitamin D3 5000 UNIT Oral Tablet; 1 Tab daily; Therapy: 71HLK8547 to (Last Rx:35Qyj7992)  Requested for: 48YBM3623 Ordered    Allergies    1   No Known Drug Allergies    Signatures   Electronically signed by : Reena Meza, ; Oct 18 2017  3:18PM EST                       (Author)

## 2018-01-12 NOTE — RESULT NOTES
Verified Results  (1) CBC/PLT/DIFF 70Vcf8496 11:38AM Fritz Sheffield   TW Order Number: [id number]     Test Name Result Flag Reference   WBC COUNT 9 62 Thousand/uL  4 31-10 16   RBC COUNT 3 47 Million/uL L 3 88-5 62   HEMOGLOBIN 8 6 g/dL L 12 0-17 0   HEMATOCRIT 29 0 % L 36 5-49 3   MCV 84 fL  82-98   MCH 24 8 pg L 26 8-34 3   MCHC 29 7 g/dL L 31 4-37 4   RDW 16 9 % H 11 6-15 1   MPV 9 4 fL  8 9-12 7   PLATELET COUNT 344 Thousands/uL H 149-390   nRBC AUTOMATED 0 /100 WBCs     NEUTROPHILS RELATIVE PERCENT 74 %  43-75   LYMPHOCYTES RELATIVE PERCENT 17 %  14-44   MONOCYTES RELATIVE PERCENT 7 %  4-12   EOSINOPHILS RELATIVE PERCENT 2 %  0-6   BASOPHILS RELATIVE PERCENT 0 %  0-1   NEUTROPHILS ABSOLUTE COUNT 7 13 Thousands/?L  1 85-7 62   LYMPHOCYTES ABSOLUTE COUNT 1 61 Thousands/?L  0 60-4 47   MONOCYTES ABSOLUTE COUNT 0 64 Thousand/?L  0 17-1 22   EOSINOPHILS ABSOLUTE COUNT 0 17 Thousand/?L  0 00-0 61   BASOPHILS ABSOLUTE COUNT 0 04 Thousands/?L  0 00-0 10   - Patient Instructions: This bloodwork is non-fasting  Please drink two glasses of water morning of bloodwork  - Patient Instructions: This bloodwork is non-fasting  Please drink two glasses of water morning of bloodwork  Discussion/Summary   please let pt know his BW shows his hemoglobin is low, it dropped from 12 5 to 8 6 in 2 months  His platelets, a type of cell in his blood is high at 656 should be no more than 400  I am concerned about these findings  His hemoglobin, is very low  Has he had a colonoscopy done ever? How is he feeling, is he more tired than usual, any SOB or CP, if so please go to ED  Also would like him to have an xray of his R arm, and repeat BW on Monday        Signatures   Electronically signed by : GIUSEPPE Dodson ; Sep 30 2016  3:31PM EST                       (Author)

## 2018-01-12 NOTE — MISCELLANEOUS
Discussion/Summary  Discussion Summary:   Due to miscommunication about scheduled patient did not come, and thus was not seen  History of Present Illness  TCM Communication [facility]: The patient is being contacted for follow-up after hospitalization  He was hospitalized at [facility]  The date of admission: 10/15/2016, date of discharge: 10/22/2016  Diagnosis: E  COLI UTI, STAGE IV SACRAL PRESSURE ULCER  He was discharged with home health services, [facility]  Medications were not reviewed today  He scheduled a follow up appointment  Follow-up appointments with other specialists: PCP F/U APPT 11/01/16 10AM  The patient is currently asymptomatic  Communication performed and completed by Miriam Lobato   HPI: ADVISED PT TO BRING UPDATED MED LIST WITH HIM TO APPT      Active Problems    1  Anemia (285 9) (D64 9)   2  Benign essential hypertension (401 1) (I10)   3  Cellulitis of right lower extremity (682 6) (L03 115)   4  Cellulitis of upper arm (682 3) (L03 119)   5  Chronic ulcer of sacral region (707 8) (L98 499)   6  Colon cancer screening (V76 51) (Z12 11)   7  Constipation (564 00) (K59 00)   8  Decubitus ulcer of left heel, stage 3 (984 91,646 29) (T92 016)   9  Decubitus ulcer of left ischium, stage 4 (046 35,641 88) (L89 324)   10  Decubitus ulcer of sacral region, stage 4 (707 03,707 24) (L89 154)   11  Diabetic eye exam (V72 0,250 00) (E11 9,Z01 00)   12  DM type 2 (diabetes mellitus, type 2) (250 00) (E11 9)   13  Dyslipidemia (272 4) (E78 5)   14  Edema (782 3) (R60 9)   15  Encounter for diabetic foot exam (250 00) (E11 9)   16  Need for immunization against influenza (V04 81) (Z23)   17  Need for prophylactic vaccination and inoculation against influenza (V04 81) (Z23)   18  Need for Tdap vaccination (V06 1) (Z23)   19  Neurogenic bladder (596 54) (N31 9)   20  Open wound of hip and thigh (890 0) (S71 009A,S71 109A)   21  Open wound of right upper arm, initial encounter (880 03) (S41 101A)   22   Other insomnia (780 52) (G47 09)   23  Paraplegic spinal paralysis (344 1) (G82 20)   24  Screening for depression (V79 0) (Z13 89)   25  Thrombocytosis (238 71) (D47 3)   26  Urinary tract infection (599 0) (N39 0)    Past Medical History    1  History of Clostridium difficile infection (V12 09) (Z86 19)   2  Denied: History of drug abuse   3  History of fever (V13 89) (Z87 898)   4  Denied: History of mental disorder   5  History of Meningitis (V12 42)   6  History of Poorly controlled type 2 diabetes mellitus (250 00) (E11 65)    Surgical History    1  History of Amputation At Hip   2  History of Bladder Surgery   3  History of Lower Back Surgery    Family History  Mother    1  Denied: Family history of Drug abuse   2  No family history of mental disorder   3  Patient's father is  (V19 8) (Z80 80)   4  Patient's mother is  (V24 11) (Z80 80)  Father    5  Denied: Family history of Drug abuse   6  No family history of mental disorder   7  Patient's father is  (V19 8) (Z80 80)   6  Patient's mother is  (V24 11) (Z80 80)  Family History    9  Family history of No Significant Family History    Social History    · Being A Social Drinker   · Former smoker (N31 63) (Z57 916)   · Native Language Turkmen   · No drug use   · [facility]   · Social history reviewed, unchanged    Current Meds   1  Aspirin EC Low Dose 81 MG Oral Tablet Delayed Release; take 1 tablet by mouth every   day; Therapy: 25JKP6171 to (Evaluate:41Don5868)  Requested for: 2016; Last   Rx:2016 Ordered   2  Cephalexin 500 MG Oral Capsule; TAKE 1 CAPSULE 4 TIMES DAILY UNTIL GONE;   Therapy: 59QIX0537 to (Evaluate:2016)  Requested for: 59NFJ4938; Last   Rx:70Sle7205 Ordered   3  Disposable Underpads 30"x36" Miscellaneous; change upto 8 times a day; Therapy: 29USK1358 to (Last Rx:34Jxy8388)  Requested for: 90WVD5883 Ordered   4  Gauze Pads 4"X4" Pad; use to cover wound change daily;    Therapy: 52KXV1633 to (Last Rx: 56DPF4556)  Requested for: 76BWT1208 Ordered   5  Glucerna Oral Liquid; 1 can prn daily when no appetite; Therapy: 82ZZR7500 to (Last Rx:11May2015)  Requested for: 52ZMT8338 Ordered   6  Ibuprofen 800 MG Oral Tablet; TAKE 1 TABLET BY MOUTH EVERY DAY AS NEEDED; Therapy: 13YEZ3244 to (96 746180)  Requested for: 48Ihq9413; Last   Rx:37Znx7170 Ordered   7  Iron Supplement 325 (65 Fe) MG TABS; TAKE 1 TABLET TWICE DAILY WITH MEALS; Therapy: 63HJX3864 to (Evaluate:18May2016)  Requested for: 13HAO8963; Last   Rx:19Jan2016 Ordered   8  Medipore Surgical 2"x2yd TAPE; USE AS DIRECTED; Therapy: 19AUZ7392 to (Last Rx:92Nmr3516)  Requested for: 98WGI6552 Ordered   9  Omeprazole 20 MG Oral Capsule Delayed Release; TAKE ONE CAPSULE BY MOUTH   EVERY NIGHT AT BEDTIME; Therapy: 14HBB2422 to (Evaluate:21Mar2017)  Requested for: 96NNP6361; Last   Rx:94Qmz8430 Ordered   10  OxyCODONE HCl - 30 MG Oral Tablet; take 1 tablet every twelve hours; Therapy: 79WSZ0959 to (Evaluate:15Oct2016); Last Rx:79Hpw3995 Ordered   11  Premier Urostomy 2-1/2" Pouch Miscellaneous; USE AS DIRECTED; Therapy: 90GNJ1529 to (Last Rx:13May2016)  Requested for: 76BOL9968 Ordered   12  Premium Skin Barrier Miscellaneous; USE AS DIRECTED; Therapy: 50YEE0297 to (Last Rx:13May2016)  Requested for: 76XXD1513 Ordered   13  Santyl 250 UNIT/GM External Ointment; APPLY TO AFFECTED AREA(S) ONCE DAILY AS    DIRECTED; Therapy: 83Hop4587 to (Last Tj Slimmer)  Requested for: 80NUE5111 Ordered   14  Skin Prep Wipes Miscellaneous; USE AS DIRECTED; Therapy: 10NER4854 to (Last Rx:13May2016)  Requested for: 57DIZ2883 Ordered   15  SM Rolled Gauze 3"x4 1yd Miscellaneous; USE AS DIRECTED; Therapy: 77VJT8843 to (Last Rx:13May2016)  Requested for: 12EJG4530 Ordered   16  Surgical Dressing 5"X9" Pad; USE AS DIRECTED; Therapy: 65GYW2304 to (Last Rx:13May2016)  Requested for: 67YLD2478 Ordered   17   Tegaderm + Pad 2"x2-3/4" Miscellaneous; USE AS DIRECTED; Therapy: 79MKX0861 to (Last Rx:90Oee3083)  Requested for: 86GXA5593 Ordered   18  Underpads Large Miscellaneous; 4 pads per month; Therapy: 39COK3864 to (Last VT:51TTQ1915)  Requested for: 45UHH1696 Ordered   19  Vitamin C 500 MG Oral Tablet; take 1 tablet by mouth twice daily; Therapy: 42YKR9656 to (Evaluate:21Mar2017)  Requested for: 38FWH0124; Last    Rx:89Spz5862 Ordered   20  Vitamin D3 5000 UNIT Oral Tablet; 1 Tab daily; Therapy: 78XBU3521 to (Last Rx:09Jkd3001)  Requested for: 33YQS2139 Ordered   21  Zolpidem Tartrate 5 MG Oral Tablet; TAKE 1 TABLET AT BEDTIME AS NEEDED; Therapy: 90YHI6646 to (Evaluate:18Apr2016); Last Rx:19Jan2016 Ordered    Allergies    1  No Known Drug Allergies    Future Appointments    Date/Time Provider Specialty Site   11/16/2016 10:00 AM Sapphire Cardenas, [address] [facility]   11/08/2016 10:00 AM GIUSEPPE Anand   [address] [place] [address]     Signatures   Electronically signed by : GIUSEPPE Mac ; Nov 2 2016  2:34PM EST                       (Author)

## 2018-01-13 VITALS
SYSTOLIC BLOOD PRESSURE: 116 MMHG | DIASTOLIC BLOOD PRESSURE: 70 MMHG | HEART RATE: 99 BPM | RESPIRATION RATE: 20 BRPM | TEMPERATURE: 96.8 F | OXYGEN SATURATION: 100 %

## 2018-01-13 VITALS
HEIGHT: 67 IN | DIASTOLIC BLOOD PRESSURE: 80 MMHG | SYSTOLIC BLOOD PRESSURE: 110 MMHG | BODY MASS INDEX: 28.25 KG/M2 | HEART RATE: 84 BPM | TEMPERATURE: 97.7 F | RESPIRATION RATE: 16 BRPM | WEIGHT: 180 LBS

## 2018-01-13 NOTE — MISCELLANEOUS
Message   Date: 09 May 2017 1:52 PM EST, Recorded By: REMOTV Party For: Beatriz Cardozo   Caller: Ruth Olivera, Care Giver   Phone: [phone] (Work)   Reason: Care Coordination   [facility] hospitals calling to follow up on Santyl order in [facility] from yesterday  States patient does not have any Santyl left from previous wound but wound has healed  Also following up to make sure pt is scheduled for 2 week POONAM appt with Dr Chun Dorado  Aware that our  will be calling patient to schedule  Active Problems    1  Anemia (285 9) (D64 9)   2  Benign essential hypertension (401 1) (I10)   3  Cellulitis of right lower extremity (682 6) (L03 115)   4  Cellulitis of upper arm (682 3) (L03 119)   5  Chronic ulcer of sacral region (707 8) (L98 499)   6  Colon cancer screening (V76 51) (Z12 11)   7  Constipation (564 00) (K59 00)   8  Decubitus ulcer of left heel, stage 3 (475 05,858 98) (F50 848)   9  Decubitus ulcer of left heel, unstageable (707 07,707 25) (L89 620)   10  Decubitus ulcer of left ischium, stage 4 (109 36,092 49) (L89 324)   11  Decubitus ulcer of sacral region, stage 4 (707 03,707 24) (L89 154)   12  Depression screening (V79 0) (Z13 89)   13  Diabetic eye exam (V72 0,250 00) (E11 9,Z01 00)   14  DM type 2 (diabetes mellitus, type 2) (250 00) (E11 9)   15  Dyslipidemia (272 4) (E78 5)   16  Edema (782 3) (R60 9)   17  Encounter for diabetic foot exam (250 00) (E11 9)   18  Mass of right upper extremity (782 2) (R22 31)   19  Need for immunization against influenza (V04 81) (Z23)   20  Need for prophylactic vaccination and inoculation against influenza (V04 81) (Z23)   21  Need for Tdap vaccination (V06 1) (Z23)   22  Neurogenic bladder (596 54) (N31 9)   23  Open wound of hip and thigh (890 0) (S71 009A,S71 109A)   24  Open wound of left knee without complication (076 5) (H08 506N)   25  Open wound of right upper arm, initial encounter (880 03) (S41 101A)   26   Other insomnia (780 52) (G47 09)   27  Pain in joint of right shoulder (719 41) (M25 511)   28  Paraplegic spinal paralysis (344 1) (G82 20)   29  Screening for depression (V79 0) (Z13 89)   30  Thrombocytosis (238 71) (D47 3)   31  Unspecified open wound, left ankle, sequela (906 1) (S91 002S)   32  Urinary tract infection (599 0) (N39 0)   33  Visit for pre-operative examination (V72 84) (Z01 818)    Current Meds   1  Accu-Chek FastClix Lancets Miscellaneous; TEST TWICE DAILY AS DIRECTED; Therapy: 82EJD5521 to [phone]  Requested for: 88JQR5098; Last   Rx:08Nov2016 Ordered   2  Accu-Chek SmartView In [employer]; test twice daily; Therapy: 58BSZ7497 to [phone]  Requested for: 94DRZ5959; Last   Rx:07Nov2016 Ordered   3  Aspirin EC Low Dose 81 MG Oral Tablet Delayed Release; take 1 tablet by mouth every   day; Therapy: 80JVF1494 to (Evaluate:12Kit0352)  Requested for: 23Mar2017; Last   Rx:23Mar2017 Ordered   4  Disposable Underpads 30"x36" Miscellaneous; change upto 8 times a day; Therapy: 21HIK9956 to (Last Rx:95Kxy4841)  Requested for: 27FSS1381 Ordered   5  Ferrous Sulfate 325 (65 Fe) MG Oral Tablet; TAKE 1 TABLET 2 TIMES DAILY AFTER   MEALS; Therapy: 45GGY8530 to (Evaluate:99Xsb7842)  Requested for: 50PJU9308; Last   Rx:08Nov2016 Ordered   6  Gauze Pads 4"X4" Pad; use to cover wound change daily; Therapy: 26KZR2078 to (Last Rx:71Dwa0575)  Requested for: 75RBG4024 Ordered   7  Glucerna Oral Liquid; 1 can prn daily when no appetite; Therapy: 45ILV5096 to (Last PS:09VTE6076)  Requested for: 50PFW0102 Ordered   8  Glucerna Oral Liquid; drink 3 cans per day; Therapy: 58YRJ3768 to (Last Rx:05Jan2017)  Requested for: 61NDW0529 Ordered   9  Ibuprofen 800 MG Oral Tablet; TAKE 1 TABLET BY MOUTH EVERY DAY AS NEEDED; Therapy: 21YUZ4043 to (Evaluate:25Apr2017)  Requested for: 35EFC6779; Last   Rx:57Zea4723 Ordered   10   Lidocaine HCl GEL; 2%gel applied to wound/s prior [facility]; Therapy: (Delroy Lopez) to Recorded   11  Morphine Sulfate ER 60 MG Oral Tablet Extended Release (MS Contin); take 1 po every    12 hours; Therapy: 39TBI9714 to (Last Rx:08Nov2016) Ordered   12  Omeprazole 20 MG Oral Capsule Delayed Release; TAKE ONE CAPSULE BY MOUTH    EVERY NIGHT AT BEDTIME; Therapy: 96APS8890 to (Evaluate:65Oka7920)  Requested for: 23Mar2017; Last    Rx:23Mar2017 Ordered   13  OxyCODONE HCl - 20 MG Oral Tablet; Take 1 pill every 6 hours if needed for pain; Therapy: 79AJB3478 to (Evaluate:22Apr2017); Last Rx:23Mar2017 Ordered   14  Premier Urostomy 2-1/2" Pouch Miscellaneous; USE AS DIRECTED; Therapy: 41QPB0235 to (Last Rx:13May2016)  Requested for: 02ERQ8523 Ordered   15  Premium Skin Barrier Miscellaneous; USE AS DIRECTED; Therapy: 11KDM7463 to (Last Rx:13May2016)  Requested for: 59WRB6557 Ordered   16  Santyl 250 UNIT/GM External Ointment; APPLY TO AFFECTED AREA(S) ONCE DAILY    AS DIRECTED; Therapy: 87Nmh9191 to (Last Acquanetta Alek)  Requested for: 00UYY1736 Ordered   17  Skin Prep Wipes Miscellaneous; USE AS DIRECTED; Therapy: 96BTO4736 to (Last Rx:13May2016)  Requested for: 60KUU9942 Ordered   18  SM Rolled Gauze 3"x4 1yd Miscellaneous; USE AS DIRECTED; Therapy: 99MHX9638 to (Last Rx:13May2016)  Requested for: 82HFA8262 Ordered   19  Surgical Dressing 5"X9" Pad; USE AS DIRECTED; Therapy: 88WMZ9941 to (Last Rx:13May2016)  Requested for: 61UMI4121 Ordered   20  Tegaderm + Pad 2"x2-3/4" Miscellaneous; USE AS DIRECTED; Therapy: 62NQC4967 to (Last Rx:13May2016)  Requested for: 24BVN8277 Ordered   21  Underpads Large Miscellaneous; 4 pads per month; Therapy: 51BZC5736 to (Last KN:47ZNT5732)  Requested for: 53REO4616 Ordered   22  Vitamin C 500 MG Oral Tablet; take 1 tablet by mouth twice daily; Therapy: 13ENC2062 to (Evaluate:21Mar2017)  Requested for: 15PCP9780; Last    Rx:51Wpq8832 Ordered   23   Vitamin D3 5000 UNIT Oral Tablet; 1 Tab daily; Therapy: 42YTF2666 to (Last Rx:11Shi7155)  Requested for: 87GOV1140 Ordered    Allergies    1   No Known Drug Allergies    Signatures   Electronically signed by : Vern Danielle RN; May  9 2017  2:01PM EST                       (Author)

## 2018-01-13 NOTE — RESULT NOTES
Discussion/Summary   please let pt know his urine shows he has bacteria in the urine  Sent in Nitrofurantoin for him for 5 days   Do NOT repeat urine unless he has symptoms; fever, chills, change in color of urine     Verified Results  (1) URINE MICROSCOPIC 34Jbz6854 10:49AM Azucena Lemus    Order Number: [id number]     Test Name Result Flag Reference   CLINICAL REPORT (Report)     Test:        Urine culture  Specimen Type:   Urine  Specimen Date:   7/28/2017 10:49 AM  Result Date:    7/30/2017 10:39 AM  Result Status:   Final result  Resulting Lab:   [facility] [address] [address]            Tel: [phone]      CULTURE                                       ------------------                                   >100,000 cfu/ml Escherichia coli      SUSCEPTIBILITY                                   ------------------                                                       Escherichia coli  METHOD                 BALBINA  -------------------------------------  --------------------------  AMPICILLIN ($$)             >16 00 ug/ml Resistant  AMPICILLIN + SULBACTAM ($)       16/8 ug/ml  Intermediate  AZTREONAM ($$$)             <=8 ug/ml   Susceptible  CEFAZOLIN ($)              <=8 00 ug/ml Susceptible  CIPROFLOXACIN ($)            >2 00 ug/ml  Resistant  GENTAMICIN ($$)             <=4 ug/ml   Susceptible  LEVOFLOXACIN ($)            >4 00 ug/ml  Resistant  NITROFURANTOIN             <=32 ug/ml  Susceptible  PIPERACILLIN + TAZOBACTAM ($$$)     <=16 ug/ml  Susceptible  TETRACYCLINE              >8 ug/ml   Resistant  TOBRAMYCIN ($)             >8 ug/ml   Resistant  TRIMETHOPRIM + SULFAMETHOXAZOLE ($$$)  >2/38 ug/ml  Resistant       Signatures   Electronically signed by : GIUSEPPE Cox ; Jul 31 2017  4:30PM EST                       (Author)

## 2018-01-13 NOTE — MISCELLANEOUS
History of Present Illness  TCM Communication [facility]: The patient is being contacted for follow-up after hospitalization  He was hospitalized at [address]  The date of admission: 09/27/17, date of discharge: 10/04/17  Diagnosis: PROCEDURE GLUTEAL MYOCUTANEOUS ROATTIONAL FLAP, POSTERIOR THIGH  He was discharged to home  Medications reviewed and updated today  He refused a follow up appointment due to PT MUST HAVE REST IN BED  Follow-up appointments with other specialists: TCMPR/NC  The patient is currently asymptomatic  Counseling was provided to the patient  Communication performed and completed by Julianne Acosta CMA      Active Problems    1  Anemia (285 9) (D64 9)   2  Benign essential hypertension (401 1) (I10)   3  Cellulitis of right lower extremity (682 6) (L03 115)   4  Cellulitis of upper arm (682 3) (L03 119)   5  Chronic ulcer of sacral region (707 8) (L98 429)   6  Colon cancer screening (V76 51) (Z12 11)   7  Constipation (564 00) (K59 00)   8  Decubitus ulcer of left heel, stage 3 (801 19,277 23) (Z90 210)   9  Decubitus ulcer of left heel, unstageable (707 07,707 25) (L89 620)   10  Decubitus ulcer of left ischium, stage 4 (696 43,921 89) (L89 324)   11  Decubitus ulcer of left knee, stage 3 (707 09,707 23) (D50 565)   12  Decubitus ulcer of sacral region, stage 4 (707 03,707 24) (L89 154)   13  Dehiscence of surgical wound (998 32) (T81 31XA)   14  Depression screening (V79 0) (Z13 89)   15  Diabetic eye exam (V72 0,250 00) (E11 9,Z01 00)   16  DM type 2 (diabetes mellitus, type 2) (250 00) (E11 9)   17  Dyslipidemia (272 4) (E78 5)   18  Dysuria (788 1) (R30 0)   19  Edema (782 3) (R60 9)   20  Encounter for diabetic foot exam (250 00) (E11 9)   21  Mass of right upper extremity (782 2) (R22 31)   22  Need for immunization against influenza (V04 81) (Z23)   23  Need for prophylactic vaccination and inoculation against influenza (V04 81) (Z23)   24  Need for Tdap vaccination (V06 1) (Z23)   25  Neurogenic bladder (596 54) (N31 9)   26  Open wound of hip and thigh (890 0) (S71 009A,S71 109A)   27  Open wound of left knee without complication (695 9) (X93 750R)   28  Open wound of right upper arm, initial encounter (880 03) (S41 101A)   29  Other insomnia (780 52) (G47 09)   30  Pain in joint of right shoulder (719 41) (M25 511)   31  Paraplegic spinal paralysis (344 1) (G82 20)   32  Recurrent UTI (urinary tract infection) (599 0) (N39 0)   33  Screening for depression (V79 0) (Z13 89)   34  Status post flap graft (V45 89) (Z98 890)   35  Thrombocytosis (238 71) (D47 3)   36  Unspecified open wound, left ankle, sequela (906 1) (S91 002S)   37  Urinary tract infection (599 0) (N39 0)   38  Visit for pre-operative examination (V72 84) (J77 593)    Past Medical History    1  History of Clostridium difficile infection (V12 09) (Z86 19)   2  Denied: History of drug abuse   3  History of fever (V13 89) (Z87 898)   4  Denied: History of mental disorder   5  History of Meningitis (V12 42)   6  History of Poorly controlled type 2 diabetes mellitus (250 00) (E11 65)    Surgical History    1  History of Amputation At Hip   2  History of Bladder Surgery   3  History of Lower Back Surgery    Family History  Mother    1  Denied: Family history of Drug abuse   2  No family history of mental disorder   3  Patient's father is  (V19 8) (Z80 80)   4  Patient's mother is  (V24 11) (Z80 80)  Father    5  Denied: Family history of Drug abuse   6  No family history of mental disorder   7  Patient's father is  (V19 8) (Z80 80)   6  Patient's mother is  (V24 11) (Z80 80)  Family History    9  Family history of No Significant Family History    Social History    · Being A Social Drinker   · Former smoker (G20 41) (I27 225)   · Native Language Italian   · No drug use   · [employer]   · Social history reviewed, unchanged    Current Meds   1   Accu-Chek FastClix Lancets Miscellaneous; TEST TWICE DAILY AS DIRECTED; Therapy: 01YEA9889 to (21 )  Requested for: 62NHT0723; Last   Rx:08Nov2016 Ordered   2  Accu-Chek SmartView In [employer]; test twice daily; Therapy: 57RID0858 to (21 )  Requested for: 52MLA8401; Last   Rx:07Nov2016 Ordered   3  Aspirin EC Low Dose 81 MG Oral Tablet Delayed Release; take 1 tablet by mouth every   day; Therapy: 84SWZ6039 to (Evaluate:47Oxt1564)  Requested for: 23Mar2017; Last   Rx:23Mar2017 Ordered   4  Cephalexin 500 MG Oral Capsule; TAKE 1 CAPSULE 3 TIMES DAILY; Therapy: 96LUC4010 to (Evaluate:30Jun2017)  Requested for: 14IEV1644; Last   AI:06COM1264 Ordered   5  Disposable Underpads 30"x36" Miscellaneous; change upto 8 times a day; Therapy: 07OXF0650 to (Last Rx:17May2017)  Requested for: 55SEP4301 Ordered   6  Ferrous Sulfate 325 (65 Fe) MG Oral Tablet; TAKE 1 TABLET 2 TIMES DAILY AFTER   MEALS; Therapy: 31VXL1106 to (Evaluate:52Fjy3562)  Requested for: 30Nlu5750; Last   Rx:08Nov2016 Ordered   7  Gauze Pads 4"X4" Pad; use to cover wound change daily; Therapy: 14NUY3090 to (Last Rx:75Tbb9818)  Requested for: 74USM0434 Ordered   8  Glucerna Oral Liquid; drink 3 cans per day; Therapy: 88CSI1385 to (Last Rx:17May2017)  Requested for: 71Jlb4747 Ordered   9  Glucerna Oral Liquid; drink 3 cans per day; Therapy: 29RPY0828 to (Last Rx:05Jan2017)  Requested for: 02HJN6948 Ordered   10  Ibuprofen 800 MG Oral Tablet; TAKE ONE TABLET BY MOUTH EVERY DAY AS NEEDED; Therapy: 29RFN1032 to (Erven Halim)  Requested for: 82Umw4714; Last    Rx:25Sep2017 Ordered   11  Lidocaine HCl GEL; 2%gel applied to wound/s prior to debridement  at the [place] [address] for pain; Therapy: (Marie English) to Recorded   12  Morphine Sulfate ER 60 MG Oral Tablet Extended Release; take 1 po every 12 hours; Therapy: 42HMY6868 to (Last Rx:08Nov2016) Ordered   13   Omeprazole 20 MG Oral Capsule Delayed Release; TAKE ONE CAPSULE BY MOUTH    EVERY NIGHT AT BEDTIME; Therapy: 88YCE2670 to (Evaluate:19Sep2017)  Requested for: 23Mar2017; Last    Rx:23Mar2017 Ordered   14  OxyCODONE HCl - 20 MG Oral Tablet; Take 1 pill every 6 hours if needed for pain; Therapy: 54ZKM9160 to (Evaluate:06Oct2017); Last Rx:03Pmr4702 Ordered   15  Premier Urostomy 2-1/2" Pouch Miscellaneous; USE AS DIRECTED; Therapy: 63NBF6414 to (Last Rx:13May2016)  Requested for: 10IQP0565 Ordered   16  Premium Skin Barrier Miscellaneous; USE AS DIRECTED; Therapy: 51ZVO7082 to (Last Rx:13May2016)  Requested for: 22JAQ2084 Ordered   17  Santyl 250 UNIT/GM External Ointment; APPLY TO AFFECTED AREA(S) ONCE DAILY AS    DIRECTED; Therapy: 42Awr8762 to (Last Everardo Maria Del Carmen)  Requested for: 10OJV4319 Ordered   18  Skin Prep Wipes Miscellaneous; USE AS DIRECTED; Therapy: 82FHQ5993 to (Last Rx:13May2016)  Requested for: 21PHN6502 Ordered   19  SM Rolled Gauze 3"x4 1yd Miscellaneous; USE AS DIRECTED; Therapy: 06DTD5556 to (Last Rx:13May2016)  Requested for: 91FSL4806 Ordered   20  Surgical Dressing 5"X9" Pad; USE AS DIRECTED; Therapy: 03IRI2150 to (Last Rx:13May2016)  Requested for: 29JAG5705 Ordered   21  Tegaderm + Pad 2"x2-3/4" Miscellaneous; USE AS DIRECTED; Therapy: 09MCI2154 to (Last Rx:13May2016)  Requested for: 20HPG8729 Ordered   22  Underpads Extra Large Miscellaneous; change twice daily; Therapy: 93QLZ8986 to (Last Rx:07Jun2017)  Requested for: 07Jun2017 Ordered   23  Underpads Large MISC; 4 pads per month; Therapy: 08IYI4665 to (Last DC:24XLH2797)  Requested for: 21DHL3353 Ordered   24  Underpads Miscellaneous; change twice weekly; Therapy: 58AMI3370 to (Last Rx:17May2017)  Requested for: 84RAQ8258 Ordered   25  Vitamin C 500 MG Oral Tablet; take 1 tablet by mouth twice daily; Therapy: 99XIB2449 to (Evaluate:21Mar2017)  Requested for: 50PFC2735; Last    Rx:42Ksv8839 Ordered   26  Vitamin D3 5000 UNIT Oral Tablet; 1 Tab daily;     Therapy: 72WKO4500 to (Last Rx: 85WOB5345)  Requested for: 63GMT9141 Ordered    Allergies    1  No Known Drug Allergies    Message   Recorded as Task   Date: 10/04/2017 04:27 PM, Created By: System   Task Name: Hospital POONAM   Assigned To: [facility],CLINICAL TEAM   Regarding Patient: Min Freitas, Status: Active   Comment:    System - 04 Oct 2017 4:27 PM     Patient discharged from hospital   Patient Name: Sim [place]  Patient YOB: 1960  Discharge Date: 10/4/2017  Facility: [facility] - 05 Oct 2017 7:21 AM     TASK REASSIGNED: Previously Assigned To Latha Corona - 05 Oct 2017 11:35 AM     TASK REASSIGNED: Previously Assigned To [facility],CLINICAL TEAM  I spoke with pt and pt states has to rest in bed x 3 weeks, can't leave the house  Pt is doing fine, pt will call us back when he is ready for f/u    TCMPR/NC     Signatures   Electronically signed by : Meaghan Weaver, ; Oct  5 2017 11:37AM EST                       (Author)    Electronically signed by : GIUSEPPE Chinchilla ; Oct  5 2017  1:07PM EST

## 2018-01-14 VITALS
HEIGHT: 67 IN | HEART RATE: 68 BPM | WEIGHT: 180 LBS | BODY MASS INDEX: 28.25 KG/M2 | TEMPERATURE: 96.7 F | SYSTOLIC BLOOD PRESSURE: 100 MMHG | DIASTOLIC BLOOD PRESSURE: 64 MMHG | RESPIRATION RATE: 18 BRPM

## 2018-01-14 VITALS
BODY MASS INDEX: 28.25 KG/M2 | HEART RATE: 80 BPM | WEIGHT: 180 LBS | SYSTOLIC BLOOD PRESSURE: 100 MMHG | RESPIRATION RATE: 16 BRPM | HEIGHT: 67 IN | DIASTOLIC BLOOD PRESSURE: 80 MMHG | TEMPERATURE: 98.9 F

## 2018-01-14 VITALS
TEMPERATURE: 98 F | WEIGHT: 180 LBS | DIASTOLIC BLOOD PRESSURE: 78 MMHG | HEART RATE: 78 BPM | BODY MASS INDEX: 20.83 KG/M2 | RESPIRATION RATE: 20 BRPM | HEIGHT: 78 IN | SYSTOLIC BLOOD PRESSURE: 118 MMHG

## 2018-01-14 VITALS
TEMPERATURE: 96 F | SYSTOLIC BLOOD PRESSURE: 102 MMHG | DIASTOLIC BLOOD PRESSURE: 68 MMHG | OXYGEN SATURATION: 99 % | HEART RATE: 94 BPM | RESPIRATION RATE: 18 BRPM

## 2018-01-14 VITALS
TEMPERATURE: 97 F | DIASTOLIC BLOOD PRESSURE: 70 MMHG | HEART RATE: 99 BPM | RESPIRATION RATE: 18 BRPM | SYSTOLIC BLOOD PRESSURE: 110 MMHG | OXYGEN SATURATION: 100 %

## 2018-01-14 NOTE — MISCELLANEOUS
Re: Letter of medical necessity    To whom it may concern,    Narcisa Matute utilizes his power wheelchair as his primary means of mobility and depends on it for all his MRADLs  Elis Oakes still uses his wheelchair and  it requires repairs  Attached please find the list of replacement parts that Rikki's power wheelchair requires  The repairs will require 26 units of labor to remove the old parts and to install the new parts, and to perform diagnosis and  safety inspections  All these repairs are necessary to keep the wheelchair safe and operational for Elis Oakes  As the treating physician following Rikki's care I have read and agree with the information in the attached report  I concur that  the mentioned items are necessary  Electronically signed Jake GONZALES    Jan 26 2017  3:37PM EST

## 2018-01-15 NOTE — PROGRESS NOTES
Assessment    1  Other insomnia (780 52) (G47 09)   2  History of Amputation At Hip   3  Chronic ulcer of sacral region (707 8) (L98 499)   4  Neurogenic bladder (596 54) (N31 9)    Plan  Colon cancer screening    · 1 - Zafar DIAZ, Kristy Mas  (Gastroenterology) Physician Referral  Consult  Status: Unauthorized -  Requires Signature  Requested for: 43BOA8005  Care Summary provided  : Yes   · COLONOSCOPY; Status:Voided;   DM type 2 (diabetes mellitus, type 2)    · Iron Supplement 325 (65 Fe) MG Oral Tablet; TAKE 1 TABLET TWICE DAILY  WITH MEALS   · *VB - Eye Exam; Status:Unauthorized - Requires Signature; Requested QYI:45ZQH7655;    · *VB-Foot Exam; Status:Unauthorized - Requires Signature; Requested ITN:[id number]; Health Maintenance    · Omeprazole 20 MG Oral Capsule Delayed Release; TAKE ONE CAPSULE BY  MOUTH EVERY NIGHT AT BEDTIME   · Vitamin D3 5000 UNIT Oral Tablet; 1 Tab daily  Open wound of hip and thigh    · Vitamin C 500 MG Oral Tablet; take 1 tablet by mouth twice daily  Other insomnia    · Zolpidem Tartrate 5 MG Oral Tablet; TAKE 1 TABLET AT BEDTIME AS NEEDED  Paraplegic spinal paralysis    · Ibuprofen 800 MG Oral Tablet; TAKE 1 TABLET BY MOUTH EVERY DAY AS  NEEDED   · Oxycodone-Acetaminophen 5-325 MG Oral Tablet (Percocet); TAKE 1 TABLET  EVERY 8 HOURS AS NEEDED    Chief Complaint  Follow up from [facility] in [facility]      History of Present Illness  54 yo  patient, paraplegic, with long standing history of non healing ulcer and osteomyelitis recently inpatient at [facility] in [place], s/p R leg amputation at the hip as per patient to treat the osteomyelitis  No records available at the time of encounter  Patient states he is receiving wound care from a wound care nurse at home from orders from [address]  [facility]  He self caths every 5 hours  He checks his blood sugar at home and states fasting blood sugar is around 60, so he has stopped using insulin       Pain Assessment the patient states they do not have pain  Abuse And Domestic Violence Screen   Domestic violence screen not done today  Reason DV Screen not done: language barrier    Depression And Suicide Screen  Suicide screen not done today  Reason suicide screen not done: language barrier  Review of Systems    Constitutional: No fever or chills, feels well, no tiredness, no recent weight gain or weight loss  Eyes: No complaints of eye pain, no red eyes, no discharge from eyes, no itchy eyes  ENT: no complaints of earache, no hearing loss, no nosebleeds, no nasal discharge, no sore throat, no hoarseness  Cardiovascular: No complaints of slow heart rate, no fast heart rate, no chest pain, no palpitations, no leg claudication, no lower extremity  Respiratory: No complaints of shortness of breath, no wheezing, no cough, no SOB on exertion, no orthopnea or PND  Gastrointestinal: No complaints of abdominal pain, no constipation, no nausea or vomiting, no diarrhea or bloody stools  Genitourinary: No complaints of dysuria, no incontinence, no hesitancy, no nocturia, no genital lesion, no testicular pain  Musculoskeletal: limb pain  Integumentary: as noted in HPI  Neurological: No compliants of headache, no confusion, no convulsions, no numbness or tingling, no dizziness or fainting, no limb weakness, no difficulty walking  Psychiatric: sleep disturbances and depression, but not suicidal, no anxiety, no personality change and no emotional problems  Endocrine: No complaints of proptosis, no hot flashes, no muscle weakness, no erectile dysfunction, no deepening of the voice, no feelings of weakness  Active Problems    1  Benign essential hypertension (401 1) (I10)   2  Clostridium difficile infection (041 84) (B96 89)   3  Colon cancer screening (V76 51) (Z12 11)   4  Constipation (564 00) (K59 00)   5  DM type 2 (diabetes mellitus, type 2) (250 00) (E11 9)   6  Dyslipidemia (272 4) (E78 5)   7  Edema (782 3) (R60 9)   8  Fever (780 60) (R50 9)   9  Need for prophylactic vaccination and inoculation against influenza (V04 81) (Z23)   10  Open wound of hip and thigh (890 0) (S71 009A,S71 109A)   11  Paraplegic spinal paralysis (344 1) (G82 20)   12  Poorly controlled type 2 diabetes mellitus (250 00) (E11 65)   13  Urinary tract infection (599 0) (N39 0)    Past Medical History    1  History of Meningitis (V12 42)    Surgical History    1  History of Amputation At Hip   2  History of Bladder Surgery   3  History of Lower Back Surgery    Family History    1  Family history of No Significant Family History    Social History    · Being A Social Drinker   · Former smoker (C21 50) (T00 717)   · Native Language Monegasque   · Never A Smoker    Current Meds   1  Accu-Chek FastClix Lancets Miscellaneous; TEST TWICE DAILY AS DIRECTED; Therapy: 23XXZ5800 to (Dirk Jana)  Requested for: 69OBU8905; Last   Rx:08Oct2015 Ordered   2  Accu-Chek SmartView In [employer]; test twice daily; Therapy: 00ROP2221 to (Dirk Jana)  Requested for: 30XZD8563; Last   Rx:08Oct2015 Ordered   3  Blood Glucose Test In Vitro Strip; TEST THREE TIMES DAILY AS DIRECTED; Therapy: 88DYR3690 to (Last Rx:75Ajp4184)  Requested for: 08YHV3220 Ordered   4  Glucerna Oral Liquid; 1 can prn daily when no appetite; Therapy: 32KZI0018 to (Last Rx:36Utl1851)  Requested for: 29PDD1360 Ordered   5  Hydrochlorothiazide 25 MG Oral Tablet; take 0 5 tablet daily; Therapy: 31CVG7086 to (Evaluate:21Apr2015)  Requested for: 77CRX3868; Last   Rx:62Foh0835 Ordered   6  Ibuprofen 800 MG Oral Tablet; TAKE 1 TABLET BY MOUTH EVERY DAY AS NEEDED; Therapy: 32VGR9046 to (Evaluate:17Jan2016)  Requested for: 82LKZ6408; Last   Rx:53Eag9326 Ordered   7  Iron Supplement 325 (65 Fe) MG Oral Tablet; TAKE 1 TABLET TWICE DAILY WITH   MEALS; Therapy: 44HZO7722 to (Evaluate:36Cgl5283)  Requested for: 62ABP2762; Last   Rx:65Pof5023 Ordered   8   Lancets Miscellaneous; Test 3 times daily; Therapy: 29DIC9553 to (Last Rx:77Yjq8314)  Requested for: 42WOZ4183 Ordered   9  Lantus SoloStar 100 UNIT/ML Subcutaneous Solution Pen-injector; INJECT 25 UNITS   SUBCUTANOUSLY EVERY 12 HOURS; Therapy: 98TQE9749 to (Evaluate:17Mar2016)  Requested for: 70LNM4521; Last   Rx:77Xxb1183 Ordered   10  Omeprazole 20 MG Oral Capsule Delayed Release; TAKE ONE CAPSULE BY MOUTH    EVERY NIGHT AT BEDTIME; Therapy: 71VXJ5705 to (Evaluate:20Jan2016)  Requested for: 17Ptd1015; Last    Rx:54Lvb5637 Ordered   11  Oxycodone-Acetaminophen 5-325 MG Oral Tablet; TAKE 1 TABLET EVERY 8 HOURS AS    NEEDED; Therapy: 86Icb3432 to (Evaluate:12Jgv8708); Last GM:88ZZJ0416 Ordered   12  Vitamin C 500 MG Oral Tablet; take 1 tablet by mouth twice daily; Therapy: 47EXG8145 to (Evaluate:14Nov2015)  Requested for: 00BQK9414; Last    Rx:34Dmx6584 Ordered   13  Vitamin D3 5000 UNIT Oral Tablet; 1 Tab daily; Therapy: 91TRE3840 to (Last Rx:07Lrn2267)  Requested for: 81MSZ6695 Ordered    Allergies    1  No Known Drug Allergies    Vitals  Vital Signs [Data Includes: Current Encounter]    Recorded: 46HTX4579 10:51AM   Temperature 98 6 F    Heart Rate 84    Respiration 18    Systolic 123    Diastolic 72    Patient Refused Height No No   Patient Refused Weight No No   Height Unobtainable Yes    Weight Unobtainable Yes      Physical Exam    Constitutional   General appearance: No acute distress, well appearing and well nourished  Eyes   Conjunctiva and lids: Abnormal     Pupils and irises: Abnormal     Pulmonary   Respiratory effort: No increased work of breathing or signs of respiratory distress  Auscultation of lungs: Clear to auscultation, equal breath sounds bilaterally, no wheezes, no rales, no rhonci  Cardiovascular   Auscultation of heart: Normal rate and rhythm, normal S1 and S2, without murmurs  Abdomen   Abdomen: Non-tender, no masses      Lymphatic   Palpation of lymph nodes in neck: No lymphadenopathy  Musculoskeletal   Gait and station: Abnormal   Gait evaluation demonstrated non-weight bearing bilaterally  Digits and nails: Normal without clubbing or cyanosis      Psychiatric   Orientation to person, place and time: Normal     Mood and affect: Normal          Signatures   Electronically signed by : GIUSEPPE Sánchez ; Jan 19 2016  3:03PM EST                       (Author)

## 2018-01-15 NOTE — MISCELLANEOUS
Provider Comments  Provider Comments:   Pt didn't show to his appointment        Signatures   Electronically signed by : GIUSEPPE Irene ; Dec 19 2016  1:47PM EST

## 2018-01-15 NOTE — MISCELLANEOUS
Message     Date: 13 Nov 2017 8:45 AM EST, Recorded By: Shun Moreland For: Bam File   Phone: [phone], [phone] Rehab to follow up pressure mapping and spoke with Emily Campoverdeers, she stated the Therapists schedule the appointment and she would call me back tomorrow(11/14/17)  ([phone])      Called [facility]'s [facility] and spoke with Mariangel Soares to state that patient's wound care has stayed the same  ([phone])        Active Problems    1  Anemia (285 9) (D64 9)   2  Benign essential hypertension (401 1) (I10)   3  Cellulitis of right lower extremity (682 6) (L03 115)   4  Cellulitis of upper arm (682 3) (L03 119)   5  Chronic ulcer of sacral region (707 8) (L98 429)   6  Colon cancer screening (V76 51) (Z12 11)   7  Constipation (564 00) (K59 00)   8  Decubitus ulcer of left heel, stage 3 (756 63,437 06) (W91 813)   9  Decubitus ulcer of left heel, unstageable (707 07,707 25) (L89 620)   10  Decubitus ulcer of left ischium, stage 4 (323 45,484 90) (L89 324)   11  Decubitus ulcer of left knee, stage 3 (707 09,707 23) (U77 032)   12  Decubitus ulcer of sacral region, stage 4 (707 03,707 24) (L89 154)   13  Dehiscence of surgical wound (998 32) (T81 31XA)   14  Depression screening (V79 0) (Z13 89)   15  Diabetic eye exam (V72 0,250 00) (E11 9,Z01 00)   16  DM type 2 (diabetes mellitus, type 2) (250 00) (E11 9)   17  Dyslipidemia (272 4) (E78 5)   18  Dysuria (788 1) (R30 0)   19  Edema (782 3) (R60 9)   20  Encounter for diabetic foot exam (250 00) (E11 9)   21  Mass of right upper extremity (782 2) (R22 31)   22  Need for immunization against influenza (V04 81) (Z23)   23  Need for prophylactic vaccination and inoculation against influenza (V04 81) (Z23)   24  Need for Tdap vaccination (V06 1) (Z23)   25  Neurogenic bladder (596 54) (N31 9)   26  Open wound of hip and thigh (890 0) (S71 009A,S71 109A)   27  Open wound of left knee without complication (774 0) (J60 241U)   28   Open wound of right upper arm, initial encounter (880 03) (S41 101A)   29  Other insomnia (780 52) (G47 09)   30  Pain in joint of right shoulder (719 41) (M25 511)   31  Paraplegic spinal paralysis (344 1) (G82 20)   32  Recurrent UTI (urinary tract infection) (599 0) (N39 0)   33  Screening for depression (V79 0) (Z13 89)   34  Status post flap graft (V45 89) (Z98 890)   35  Thrombocytosis (238 71) (D47 3)   36  Unspecified open wound, left ankle, sequela (906 1) (S91 002S)   37  Urinary tract infection (599 0) (N39 0)   38  Visit for pre-operative examination (V72 84) (Z01 818)    Current Meds   1  Accu-Chek FastClix Lancets Miscellaneous; TEST TWICE DAILY AS DIRECTED; Therapy: 77XVM3350 to (01 72 64 30 83)  Requested for: 99ZIF6227; Last   Rx:08Nov2016 Ordered   2  Accu-Chek SmartView In [employer]; test twice daily; Therapy: 97ZUJ2484 to (01 72 64 30 83)  Requested for: 97NNC1299; Last   Rx:07Nov2016 Ordered   3  Aspirin EC Low Dose 81 MG Oral Tablet Delayed Release; take 1 tablet by mouth every   day; Therapy: 72SSM5105 to (Evaluate:67Cyb2150)  Requested for: 23Mar2017; Last   Rx:23Mar2017 Ordered   4  Cephalexin 500 MG Oral Capsule (Keflex); TAKE 1 CAPSULE 3 TIMES DAILY; Therapy: 58AOH5676 to (Evaluate:30Jun2017)  Requested for: 38MYB6832; Last   RE:70ABV4987 Ordered   5  Disposable Underpads 30"x36" Miscellaneous; change upto 8 times a day; Therapy: 98POO8971 to (Last Rx:71Uwk6162)  Requested for: 80JNX1909 Ordered   6  Ferrous Sulfate 325 (65 Fe) MG Oral Tablet; TAKE 1 TABLET 2 TIMES DAILY AFTER   MEALS; Therapy: 10VNX0306 to (Evaluate:16Gfv9576)  Requested for: 07Quo7071; Last   Rx:08Nov2016 Ordered   7  Gauze Pads 4"X4" Pad; use to cover wound change daily; Therapy: 27KRX9987 to (Last Rx:57Fjt1045)  Requested for: 78WMQ2650 Ordered   8  Glucerna Oral Liquid; drink 3 cans per day; Therapy: 43DUH1663 to (Last Rx:17Tkk1272)  Requested for: 99Bmg4062 Ordered   9   Glucerna Oral Liquid; drink 3 cans per day; Therapy: 98RFU7649 to (Last Rx:05Jan2017)  Requested for: 05RBI4622 Ordered   10  Ibuprofen 800 MG Oral Tablet; TAKE ONE TABLET BY MOUTH EVERY DAY AS    NEEDED; Therapy: 69RNP9904 to (Radha Ochoa)  Requested for: 78Spz4196; Last    Rx:25Sep2017 Ordered   11  Lidocaine HCl GEL; 2%gel applied to wound/s prior to debridement  at the [place] [address] for pain; Therapy: (Edgard Davalos) to Recorded   12  Morphine Sulfate ER 60 MG Oral Tablet Extended Release (MS Contin); take 1 po every    12 hours; Therapy: 39HNF8263 to (Last Rx:08Nov2016) Ordered   13  Omeprazole 20 MG Oral Capsule Delayed Release; TAKE ONE CAPSULE BY MOUTH    EVERY NIGHT AT BEDTIME; Therapy: 42GUF3281 to (Evaluate:19Sep2017)  Requested for: 23Mar2017; Last    Rx:23Mar2017 Ordered   14  OxyCODONE HCl - 20 MG Oral Tablet; Take 1 pill every 6 hours if needed for pain; Therapy: 55JFU0249 to (Evaluate:85Qkh2564); Last Rx:10Nov2017 Ordered   15  Premier Urostomy 2-1/2" Pouch Miscellaneous; USE AS DIRECTED; Therapy: 89FMP0064 to (Last Rx:13May2016)  Requested for: 74LXE8930 Ordered   16  Premium Skin Barrier Miscellaneous; USE AS DIRECTED; Therapy: 50OWM9687 to (Last Rx:13May2016)  Requested for: 48NJQ3704 Ordered   17  Santyl 250 UNIT/GM External Ointment; APPLY TO AFFECTED AREA(S) ONCE DAILY    AS DIRECTED; Therapy: 76Qqe1644 to (Lane Isaac)  Requested for: 58KUT5403 Ordered   18  Skin Prep Wipes Miscellaneous; USE AS DIRECTED; Therapy: 44OCF1459 to (Last Rx:13May2016)  Requested for: 77IPE7432 Ordered   19  SM Rolled Gauze 3"x4 1yd Miscellaneous; USE AS DIRECTED; Therapy: 94FNE0462 to (Last Rx:13May2016)  Requested for: 46LDQ8040 Ordered   20  Surgical Dressing 5"X9" Pad; USE AS DIRECTED; Therapy: 03MSI7853 to (Last Rx:13May2016)  Requested for: 86UZM7981 Ordered   21  Tegaderm + Pad 2"x2-3/4" Miscellaneous; USE AS DIRECTED;     Therapy: 56MPP2786 to (Last Rx:13May2016)  Requested for: 25XQN2795 Ordered   22  Underpads Extra Large Miscellaneous; change twice daily; Therapy: 13YMA6953 to (Last Rx:07Jun2017)  Requested for: 07Jun2017 Ordered   23  Underpads Large MISC; 4 pads per month; Therapy: 75BRD6299 to (Last TW:95QGT7024)  Requested for: 16XYR9013 Ordered   24  Underpads Miscellaneous; change twice weekly; Therapy: 39JOF5773 to (Last Rx:47Sxa0378)  Requested for: 19BWS2083 Ordered   25  Vitamin C 500 MG Oral Tablet; take 1 tablet by mouth twice daily; Therapy: 22IKG8831 to (Evaluate:21Mar2017)  Requested for: 53SGV9147; Last    Rx:20Zyt8823 Ordered   26  Vitamin D3 5000 UNIT Oral Tablet; 1 Tab daily; Therapy: 18CRE4799 to (Last Rx:63Zdj1100)  Requested for: 53VAV4872 Ordered    Allergies    1   No Known Drug Allergies    Signatures   Electronically signed by : Rema Mccoy, ; Nov 13 2017  8:50AM EST                       (Author)

## 2018-01-15 NOTE — MISCELLANEOUS
History of Present Illness  TCM Communication [facility]: The patient is being contacted for follow-up after hospitalization  He was hospitalized at [facility]  The date of admission: 5/01/17, date of discharge: 5/08/17  Diagnosis: NON PRESSURE CHRONIC ULCER OF SKIN SURGERY  He was discharged to home, with home health services, VNA  Medications were not reviewed today  He did not schedule a follow up appointment  He refused a follow up appointment due to POST OP D/C INSTRUCTIONS  The patient is currently asymptomatic  Communication performed and completed by Lelo Webber   HPI: CALLED PT FOR [facility] APPT  PT STATED THAT HE WILL NOT BE MAKING AN APPOINTMENT UNTIL AFTER APPROX  3 WEEKS  STATES THAT BECAUSE LOCATION OF SX BEING ON HIP HE CANNOT SIT UP, AND WAS TOLD BEDREST FOR 3 WEEKS POST OP  PT HAS NO QUESTIONS, CONCERNS, OR NEEDS AT THIS MOMENT  WAS ADVISED TO CALL US SHOULD HE NEED ANYTHING  Active Problems    1  Anemia (285 9) (D64 9)   2  Benign essential hypertension (401 1) (I10)   3  Cellulitis of right lower extremity (682 6) (L03 115)   4  Cellulitis of upper arm (682 3) (L03 119)   5  Chronic ulcer of sacral region (707 8) (L98 499)   6  Colon cancer screening (V76 51) (Z12 11)   7  Constipation (564 00) (K59 00)   8  Decubitus ulcer of left heel, stage 3 (874 65,506 41) (G23 294)   9  Decubitus ulcer of left heel, unstageable (707 07,707 25) (L89 620)   10  Decubitus ulcer of left ischium, stage 4 (059 82,795 62) (L89 324)   11  Decubitus ulcer of sacral region, stage 4 (707 03,707 24) (L89 154)   12  Depression screening (V79 0) (Z13 89)   13  Diabetic eye exam (V72 0,250 00) (E11 9,Z01 00)   14  DM type 2 (diabetes mellitus, type 2) (250 00) (E11 9)   15  Dyslipidemia (272 4) (E78 5)   16  Edema (782 3) (R60 9)   17  Encounter for diabetic foot exam (250 00) (E11 9)   18  Mass of right upper extremity (782 2) (R22 31)   19  Need for immunization against influenza (V04 81) (Z23)   20   Need for prophylactic vaccination and inoculation against influenza (V04 81) (Z23)   21  Need for Tdap vaccination (V06 1) (Z23)   22  Neurogenic bladder (596 54) (N31 9)   23  Open wound of hip and thigh (890 0) (S71 009A,S71 109A)   24  Open wound of left knee without complication (026 6) (Y99 949H)   25  Open wound of right upper arm, initial encounter (880 03) (S41 101A)   26  Other insomnia (780 52) (G47 09)   27  Pain in joint of right shoulder (719 41) (M25 511)   28  Paraplegic spinal paralysis (344 1) (G82 20)   29  Screening for depression (V79 0) (Z13 89)   30  Thrombocytosis (238 71) (D47 3)   31  Unspecified open wound, left ankle, sequela (906 1) (S91 002S)   32  Urinary tract infection (599 0) (N39 0)   33  Visit for pre-operative examination (V72 84) (A34 430)    Past Medical History    1  History of Clostridium difficile infection (V12 09) (Z86 19)   2  Denied: History of drug abuse   3  History of fever (V13 89) (Z87 898)   4  Denied: History of mental disorder   5  History of Meningitis (V12 42)   6  History of Poorly controlled type 2 diabetes mellitus (250 00) (E11 65)    Surgical History    1  History of Amputation At Hip   2  History of Bladder Surgery   3  History of Lower Back Surgery    Family History  Mother    1  Denied: Family history of Drug abuse   2  No family history of mental disorder   3  Patient's father is  (V19 8) (Z80 80)   4  Patient's mother is  (V24 11) (Z80 80)  Father    5  Denied: Family history of Drug abuse   6  No family history of mental disorder   7  Patient's father is  (V19 8) (Z80 80)   6  Patient's mother is  (V24 11) (Z80 80)  Family History    9  Family history of No Significant Family History    Social History    · Being A Social Drinker   · Former smoker (Q56 62) (A30 786)   · Native Language Icelandic   · No drug use   · [employer]   · Social history reviewed, unchanged    Current Meds   1   Accu-Chek FastClix Lancets Miscellaneous; TEST TWICE DAILY AS DIRECTED; Therapy: 76NHH9676 to (77 873 135)  Requested for: 90TFG5093; Last   Rx:08Nov2016 Ordered   2  Accu-Chek SmartView In [employer]; test twice daily; Therapy: 23UVH3470 to (77 873 135)  Requested for: 52VHO6607; Last   Rx:07Nov2016 Ordered   3  Aspirin EC Low Dose 81 MG Oral Tablet Delayed Release; take 1 tablet by mouth every   day; Therapy: 07SDU0043 to (Evaluate:23Pnc2715)  Requested for: 23Mar2017; Last   Rx:23Mar2017 Ordered   4  Disposable Underpads 30"x36" Miscellaneous; change upto 8 times a day; Therapy: 64EBM1616 to (Last Rx:72Erl4146)  Requested for: 36VJO4424 Ordered   5  Ferrous Sulfate 325 (65 Fe) MG Oral Tablet; TAKE 1 TABLET 2 TIMES DAILY AFTER   MEALS; Therapy: 52CRL6728 to (Evaluate:09Yqb5738)  Requested for: 91UEK2684; Last   Rx:08Nov2016 Ordered   6  Gauze Pads 4"X4" Pad; use to cover wound change daily; Therapy: 61WZT0216 to (Last Rx:65Fqa0456)  Requested for: 34MUF0011 Ordered   7  Glucerna Oral Liquid; 1 can prn daily when no appetite; Therapy: 84AAV4596 to (Last PW:39CUX7639)  Requested for: 24RDT1569 Ordered   8  Glucerna Oral Liquid; drink 3 cans per day; Therapy: 61SSO6126 to (Last Rx:05Jan2017)  Requested for: 28AIJ4900 Ordered   9  Ibuprofen 800 MG Oral Tablet; TAKE 1 TABLET BY MOUTH EVERY DAY AS NEEDED; Therapy: 51OQZ1472 to (Evaluate:25Apr2017)  Requested for: 61OCM5801; Last   Rx:34Ose6079 Ordered   10  Lidocaine HCl GEL; 2%gel applied to wound/s prior to debridement  at the [place] [address] for pain; Therapy: (Gina Reyes) to Recorded   11  Morphine Sulfate ER 60 MG Oral Tablet Extended Release (MS Contin); take 1 po every    12 hours; Therapy: 34QEW5632 to (Last Rx:08Nov2016) Ordered   12  Omeprazole 20 MG Oral Capsule Delayed Release; TAKE ONE CAPSULE BY MOUTH    EVERY NIGHT AT BEDTIME; Therapy: 71OND7323 to (Evaluate:74Zap2907)  Requested for: 23Mar2017; Last    Rx:23Mar2017 Ordered   13  OxyCODONE HCl - 20 MG Oral Tablet; Take 1 pill every 6 hours if needed for pain; Therapy: 11DQX9099 to (Evaluate:22Apr2017); Last Rx:23Mar2017 Ordered   14  Premier Urostomy 2-1/2" Pouch Miscellaneous; USE AS DIRECTED; Therapy: 72POI7904 to (Last Rx:13May2016)  Requested for: 50YPZ6689 Ordered   15  Premium Skin Barrier Miscellaneous; USE AS DIRECTED; Therapy: 46HPI2204 to (Last Rx:13May2016)  Requested for: 24WDL5173 Ordered   16  Santyl 250 UNIT/GM External Ointment; APPLY TO AFFECTED AREA(S) ONCE DAILY AS    DIRECTED; Therapy: 70Jlb1361 to ([facility]  Requested for: 45DBR0175 Ordered   17  Skin Prep Wipes Miscellaneous; USE AS DIRECTED; Therapy: 09LFF6670 to (Last Rx:13May2016)  Requested for: 38AON9549 Ordered   18  SM Rolled Gauze 3"x4 1yd Miscellaneous; USE AS DIRECTED; Therapy: 66MSP3174 to (Last Rx:13May2016)  Requested for: 75VMQ1295 Ordered   19  Surgical Dressing 5"X9" Pad; USE AS DIRECTED; Therapy: 86KAU2482 to (Last Rx:13May2016)  Requested for: 21LPM3455 Ordered   20  Tegaderm + Pad 2"x2-3/4" Miscellaneous; USE AS DIRECTED; Therapy: 23NVZ8042 to (Last Rx:13May2016)  Requested for: 16YIS1698 Ordered   21  Underpads Large Miscellaneous; 4 pads per month; Therapy: 07XDK6508 to (Last YX:76XQJ8176)  Requested for: 57HPE0382 Ordered   22  Vitamin C 500 MG Oral Tablet; take 1 tablet by mouth twice daily; Therapy: 93XAS8598 to (Evaluate:21Mar2017)  Requested for: 34YPR4101; Last    Rx:76Hez2402 Ordered   23  Vitamin D3 5000 UNIT Oral Tablet; 1 Tab daily; Therapy: 94IEE0523 to (Last Rx:22Sep2016)  Requested for: 63FGC1601 Ordered    Allergies    1   No Known Drug Allergies    Signatures   Electronically signed by : GIUSEPPE Webster ; May  9 2017  4:04PM EST

## 2018-01-15 NOTE — MISCELLANEOUS
Message  We have attempted to get in contact with the patient for several days and have been unsuccessful  We sent the patient a letter to contact our office at his earliest convenience for his lab results  [id number]      Active Problems    1  Benign essential hypertension (401 1) (I10)   2  Chronic ulcer of sacral region (707 8) (L98 499)   3  Colon cancer screening (V76 51) (Z12 11)   4  Constipation (564 00) (K59 00)   5  Diabetic eye exam (V72 0,250 00) (E11 9,Z01 00)   6  DM type 2 (diabetes mellitus, type 2) (250 00) (E11 9)   7  Dyslipidemia (272 4) (E78 5)   8  Edema (782 3) (R60 9)   9  Encounter for diabetic foot exam (250 00) (E11 9)   10  Need for immunization against influenza (V04 81) (Z23)   11  Need for prophylactic vaccination and inoculation against influenza (V04 81) (Z23)   12  Need for Tdap vaccination (V06 1) (Z23)   13  Neurogenic bladder (596 54) (N31 9)   14  Open wound of hip and thigh (890 0) (S71 009A,S71 109A)   15  Other insomnia (780 52) (G47 09)   16  Paraplegic spinal paralysis (344 1) (G82 20)   17  Screening for depression (V79 0) (Z13 89)   18  Urinary tract infection (599 0) (N39 0)    Current Meds   1  Accu-Chek FastClix Lancets Miscellaneous; TEST TWICE DAILY AS DIRECTED; Therapy: 31YWG4563 to (Conchita Appiah)  Requested for: 48AIG1278; Last   Rx:08Oct2015 Ordered   2  Accu-Chek SmartView In [employer]; test twice daily; Therapy: 31PWQ4781 to (Conchita Appiah)  Requested for: 32TKN4343; Last   Rx:08Oct2015 Ordered   3  Aspirin EC Low Dose 81 MG Oral Tablet Delayed Release; take 1 tablet by mouth every   day; Therapy: 38HSR1507 to (Evaluate:81Yso1446)  Requested for: 14NEA9781; Last   Rx:29Jun2016 Ordered   4  Blood Glucose Test In Vitro Strip; TEST THREE TIMES DAILY AS DIRECTED; Therapy: 30TBH1681 to (Last Rx:24Wng7399)  Requested for: 90GVR9149 Ordered   5  Disposable Underpads 30"x36" Miscellaneous; change upto 8 times a day;    Therapy: 04WTX1295 to (Last Rx: 09VEW5580)  Requested for: 55EVQ3913 Ordered   6  Gauze Pads 4"X4" Pad; use to cover wound change daily; Therapy: 86VME4617 to (Last Rx:77Yzn8495)  Requested for: 44CLF9298 Ordered   7  Glucerna Oral Liquid; 1 can prn daily when no appetite; Therapy: 46OSB1311 to (Last Rx:57Wgf2903)  Requested for: 21DYM4112 Ordered   8  Ibuprofen 800 MG Oral Tablet; TAKE ONE TABLET BY MOUTH EVERY DAY AS   NEEDED; Therapy: 00AWH9178 to (Evaluate:91Ztu6556)  Requested for: 83YVX3517; Last   WX:04ZMS9839 Ordered   9  Iron Supplement 325 (65 Fe) MG Oral Tablet; TAKE 1 TABLET TWICE DAILY WITH   MEALS; Therapy: 15UFL9793 to (Evaluate:56Nbs4738)  Requested for: 61OMC0328; Last   Rx:19Jan2016 Ordered   10  Lancets Miscellaneous; Test 3 times daily; Therapy: 99EGX3533 to (Last Rx:87Tgq5940)  Requested for: 29FFX6253 Ordered   11  Lantus SoloStar 100 UNIT/ML Subcutaneous Solution Pen-injector; INJECT 25 UNITS    SUBCUTANOUSLY EVERY 12 HOURS; Therapy: 71GOH6730 to (Evaluate:17Mar2016)  Requested for: 95CAJ5045; Last    Rx:87Tee8353 Ordered   12  Medipore Surgical 2"x2yd TAPE; USE AS DIRECTED; Therapy: 05DSS8779 to (Last Rx:90Dwe2715)  Requested for: 12OJB0046 Ordered   13  Omeprazole 20 MG Oral Capsule Delayed Release; TAKE ONE CAPSULE BY MOUTH    EVERY NIGHT AT BEDTIME; Therapy: 48WSM9806 to (Evaluate:00Icr0164)  Requested for: 17HFW8071; Last    Rx:19Jan2016 Ordered   14  OxyCODONE HCl - 30 MG Oral Tablet; take 1 tablet every twelve hours; Therapy: 67RII1133 to (Evaluate:24Ksl6421); Last Rx:75Ypu6295 Ordered   15  Oxycodone-Acetaminophen 5-325 MG Oral Tablet; TAKE 1 TABLET EVERY 8 HOURS    AS NEEDED; Therapy: 22Dhz5342 to (Evaluate:19Kgb3014); Last Rx:19Jan2016 Ordered   16  Premier Urostomy 2-1/2" Pouch Miscellaneous; USE AS DIRECTED; Therapy: 76UXN7704 to (Last Rx:78Qkg3435)  Requested for: 91BRP4645 Ordered   17  Premium Skin Barrier Miscellaneous; USE AS DIRECTED;     Therapy: 69LRG2235 to (Last XJ:15PVQ3302)  Requested for: 10PAZ2116 Ordered   18  Skin Prep Wipes Miscellaneous; USE AS DIRECTED; Therapy: 51IGM9568 to (Last Rx:13May2016)  Requested for: 39KUH5199 Ordered   19  SM Rolled Gauze 3"x4 1yd Miscellaneous; USE AS DIRECTED; Therapy: 12BNT5434 to (Last Rx:13May2016)  Requested for: 06SLP5539 Ordered   20  Surgical Dressing 5"X9" Pad; USE AS DIRECTED; Therapy: 63EGA0713 to (Last Rx:13May2016)  Requested for: 03CGO9808 Ordered   21  Tegaderm + Pad 2"x2-3/4" Miscellaneous; USE AS DIRECTED; Therapy: 00QZL0762 to (Last Rx:13May2016)  Requested for: 45DCX7027 Ordered   22  TraZODone HCl - 50 MG Oral Tablet; take 1 tablet by mouth every NIGHT at bedtime if    needed; Therapy: 74TUC7127 to (Evaluate:11Jun2016)  Requested for: 39RZZ1481; Last    Rx:12May2016 Ordered   23  Underpads Large Miscellaneous; 4 pads per month; Therapy: 81WLY0991 to (Last QD:53DYY6360)  Requested for: 29WQE4965 Ordered   24  Vitamin C 500 MG Oral Tablet; take 1 tablet by mouth twice daily; Therapy: 57JFV0341 to (Evaluate:12Srq3010)  Requested for: 29QNX4451; Last    Rx:19Jan2016 Ordered   25  Vitamin D3 5000 UNIT Oral Tablet; 1 Tab daily; Therapy: 24QND8490 to (Last Rx:19Jan2016)  Requested for: 81GDR0497 Ordered   26  Zolpidem Tartrate 5 MG Oral Tablet; TAKE 1 TABLET AT BEDTIME AS NEEDED; Therapy: 69JNH6930 to (Evaluate:18Apr2016); Last Rx:19Jan2016 Ordered    Allergies    1   No Known Drug Allergies    Signatures   Electronically signed by : GIUSEPPE Jackson ; Aug  8 2016  1:57PM EST                       (Author)

## 2018-01-16 NOTE — RESULT NOTES
Verified Results  (1) CBC/PLT/DIFF 12Oct2016 10:04AM Brigette ELLER Order Number: [id number]     Test Name Result Flag Reference   WBC COUNT 8 47 Thousand/uL  4 31-10 16   RBC COUNT 3 61 Million/uL L 3 88-5 62   HEMOGLOBIN 8 5 g/dL L 12 0-17 0   HEMATOCRIT 29 5 % L 36 5-49 3   MCV 82 fL  82-98   MCH 23 5 pg L 26 8-34 3   MCHC 28 8 g/dL L 31 4-37 4   RDW 16 7 % H 11 6-15 1   MPV 9 7 fL  8 9-12 7   PLATELET COUNT 907 Thousands/uL H 149-390   nRBC AUTOMATED 0 /100 WBCs     NEUTROPHILS RELATIVE PERCENT 70 %  43-75   LYMPHOCYTES RELATIVE PERCENT 18 %  14-44   MONOCYTES RELATIVE PERCENT 10 %  4-12   EOSINOPHILS RELATIVE PERCENT 1 %  0-6   BASOPHILS RELATIVE PERCENT 1 %  0-1   NEUTROPHILS ABSOLUTE COUNT 5 91 Thousands/?L  1 85-7 62   LYMPHOCYTES ABSOLUTE COUNT 1 56 Thousands/?L  0 60-4 47   MONOCYTES ABSOLUTE COUNT 0 81 Thousand/?L  0 17-1 22   EOSINOPHILS ABSOLUTE COUNT 0 09 Thousand/?L  0 00-0 61   BASOPHILS ABSOLUTE COUNT 0 06 Thousands/?L  0 00-0 10   - Patient Instructions: This bloodwork is non-fasting  Please drink two glasses of water morning of bloodwork  - Patient Instructions: This bloodwork is non-fasting  Please drink two glasses of water morning of bloodwork         Discussion/Summary   please let pt know BW shows he is very anemic and platelets are high would like him to follow up with the blood specialist      Signatures   Electronically signed by : GIUSEPPE Black ; Oct 13 2016  4:44PM EST                       (Author)

## 2018-01-16 NOTE — RESULT NOTES
Verified Results  (1) URINALYSIS w URINE C/S REFLEX (will reflex a microscopy if leukocytes, occult blood, or nitrites are not within normal limits) 64BBU4920 04:38PM Purnima Haq    Order Number: [id number]     Test Name Result Flag Reference   COLOR Dk Yellow     CLARITY Turbid     PH UA 5 5  4 5-8 0   LEUKOCYTE ESTERASE UA Large A Negative   NITRITE UA Negative  Negative   PROTEIN UA 30 (1+) mg/dl A Negative   GLUCOSE UA Negative mg/dl  Negative   KETONES UA Negative mg/dl  Negative   UROBILINOGEN UA 1 0 E U /dl  0 2, 1 0 E U /dl   BILIRUBIN UA Negative  Negative   BLOOD UA Moderate A Negative   SPECIFIC GRAVITY UA 1 014  1 003-1 030   BACTERIA Innumerable /hpf A None Seen, Occasional   EPITHELIAL CELLS None Seen /hpf  None Seen, Occasional   HYALINE CASTS 10-25 /lpf A None Seen   RBC UA 2-4 /hpf A None Seen   WBC UA Innumerable /hpf A None Seen   COLOR Dk Yellow     CLARITY Turbid     PH UA 5 5  4 5-8 0   LEUKOCYTE ESTERASE UA Large A Negative   NITRITE UA Negative  Negative   PROTEIN UA 30 (1+) mg/dl A Negative   GLUCOSE UA Negative mg/dl  Negative   KETONES UA Negative mg/dl  Negative   UROBILINOGEN UA 1 0 E U /dl  0 2, 1 0 E U /dl   BILIRUBIN UA Negative  Negative   BLOOD UA Moderate A Negative   SPECIFIC GRAVITY UA 1 014  1 003-1 030   BACTERIA Innumerable /hpf A None Seen, Occasional   EPITHELIAL CELLS None Seen /hpf  None Seen, Occasional   HYALINE CASTS 10-25 /lpf A None Seen   RBC UA 2-4 /hpf A None Seen   WBC UA Innumerable /hpf A None Seen                   Discussion/Summary   please let pt know urine shows he has infection   Sent in CIprofloxacin 250 mg BID for 5 days     Signatures   Electronically signed by : GIUSEPPE Perez ; Nov 29 2016  1:42PM EST                       (Author)

## 2018-01-17 NOTE — RESULT NOTES
Verified Results  * XR HUMERUS RIGHT 87YOA1653 12:08PM Stacy Silva   TW Order Number: [id number]     Test Name Result Flag Reference   XR HUMERUS RIGHT (Report)     RIGHT HUMERUS     INDICATION: Right arm pain, swelling, erythema     COMPARISON: 9/14/2016     VIEWS: 2; 2 images     FINDINGS:     There is persistent dislocation of the right glenohumeral joint  Positioning is limited by the patient's clinical condition, however with suspicion of potential subacutely fractured glenoid  The acromioclavicular joint is intact     No lytic or blastic lesions are seen  Diffuse soft tissue swelling  There is no soft tissue air  IMPRESSION:     Persistent dislocation of the right glenohumeral joint with suspicion of subacute glenoid fracture        ##sigslh##sigslh       Workstation performed: [id number]     Signed by:   Jax Hughes MD   10/13/16       Signatures   Electronically signed by : GIUSEPPE Briseno ; Oct 14 2016  2:15PM EST                       (Author)

## 2018-01-18 NOTE — MISCELLANEOUS
Message   Date: 06 Nov 2017 1:32 PM EST, Recorded By: Mazin Person   Calling For: Karlie Fent: Mariella Miranda, Other   Phone: [phone] (Work)   Reason: Nigel Lanier, [facility], reports that patient has a [id number] area of separation on left buttock with tunneling and undermining noted  Next home care visit 11/8/17  Next visit in office Friday 11/10/17  Mariella Miranda made aware that Dr Leona Yang will evaluate patient at [facility] on Friday but at this time no new orders  Nicol verbalized understanding  Active Problems    1  Anemia (285 9) (D64 9)   2  Benign essential hypertension (401 1) (I10)   3  Cellulitis of right lower extremity (682 6) (L03 115)   4  Cellulitis of upper arm (682 3) (L03 119)   5  Chronic ulcer of sacral region (707 8) (L98 429)   6  Colon cancer screening (V76 51) (Z12 11)   7  Constipation (564 00) (K59 00)   8  Decubitus ulcer of left heel, stage 3 (084 46,587 07) (U48 077)   9  Decubitus ulcer of left heel, unstageable (707 07,707 25) (L89 620)   10  Decubitus ulcer of left ischium, stage 4 (236 09,842 81) (L89 324)   11  Decubitus ulcer of left knee, stage 3 (707 09,707 23) (B44 069)   12  Decubitus ulcer of sacral region, stage 4 (707 03,707 24) (L89 154)   13  Dehiscence of surgical wound (998 32) (T81 31XA)   14  Depression screening (V79 0) (Z13 89)   15  Diabetic eye exam (V72 0,250 00) (E11 9,Z01 00)   16  DM type 2 (diabetes mellitus, type 2) (250 00) (E11 9)   17  Dyslipidemia (272 4) (E78 5)   18  Dysuria (788 1) (R30 0)   19  Edema (782 3) (R60 9)   20  Encounter for diabetic foot exam (250 00) (E11 9)   21  Mass of right upper extremity (782 2) (R22 31)   22  Need for immunization against influenza (V04 81) (Z23)   23  Need for prophylactic vaccination and inoculation against influenza (V04 81) (Z23)   24  Need for Tdap vaccination (V06 1) (Z23)   25   Neurogenic bladder (596 54) (N31 9)   26  Open wound of hip and thigh (890 0) (S71 009A,S71 109A)   27  Open wound of left knee without complication (212 5) (A08 164O)   28  Open wound of right upper arm, initial encounter (880 03) (S41 101A)   29  Other insomnia (780 52) (G47 09)   30  Pain in joint of right shoulder (719 41) (M25 511)   31  Paraplegic spinal paralysis (344 1) (G82 20)   32  Recurrent UTI (urinary tract infection) (599 0) (N39 0)   33  Screening for depression (V79 0) (Z13 89)   34  Status post flap graft (V45 89) (Z98 890)   35  Thrombocytosis (238 71) (D47 3)   36  Unspecified open wound, left ankle, sequela (906 1) (S91 002S)   37  Urinary tract infection (599 0) (N39 0)   38  Visit for pre-operative examination (V72 84) (Z01 818)    Current Meds   1  Accu-Chek FastClix Lancets Miscellaneous; TEST TWICE DAILY AS DIRECTED; Therapy: 10FZZ3473 to (03 17 74 30 53)  Requested for: 82VUM4132; Last   Rx:08Nov2016 Ordered   2  Accu-Chek SmartView In [employer]; test twice daily; Therapy: 48HBZ3784 to (03 17 74 30 53)  Requested for: 89YNB3493; Last   Rx:07Nov2016 Ordered   3  Aspirin EC Low Dose 81 MG Oral Tablet Delayed Release; take 1 tablet by mouth every   day; Therapy: 84OMY1452 to (Evaluate:55Ete4852)  Requested for: 23Mar2017; Last   Rx:23Mar2017 Ordered   4  Cephalexin 500 MG Oral Capsule (Keflex); TAKE 1 CAPSULE 3 TIMES DAILY; Therapy: 96UZR7923 to (Evaluate:30Jun2017)  Requested for: 21VIU0232; Last   UL:50QFN3139 Ordered   5  Disposable Underpads 30"x36" Miscellaneous; change upto 8 times a day; Therapy: 99SDG0074 to (Last Rx:35Zng6194)  Requested for: 55SEP9756 Ordered   6  Ferrous Sulfate 325 (65 Fe) MG Oral Tablet; TAKE 1 TABLET 2 TIMES DAILY AFTER   MEALS; Therapy: 60QHQ4955 to (Evaluate:31Qnw5894)  Requested for: 35Xin0366; Last   Rx:08Nov2016 Ordered   7  Gauze Pads 4"X4" Pad; use to cover wound change daily; Therapy: 39DXM0643 to (Last Rx:01Ehe5509)  Requested for: 52LCI5508 Ordered   8  Glucerna Oral Liquid; drink 3 cans per day;    Therapy: 05CND2780 to (Last Rx:17May2017)  Requested for: 47Sko8818 Ordered   9  Glucerna Oral Liquid; drink 3 cans per day; Therapy: 93CCG8224 to (Last Rx:05Jan2017)  Requested for: 56QUQ9213 Ordered   10  Ibuprofen 800 MG Oral Tablet; TAKE ONE TABLET BY MOUTH EVERY DAY AS    NEEDED; Therapy: 71AXV0172 to (Deon Castro)  Requested for: 37Kqx0944; Last    Rx:87Cro3164 Ordered   11  Lidocaine HCl GEL; 2%gel applied to wound/s prior to debridement  at the [place] [address] for pain; Therapy: (Chelsey Moya) to Recorded   12  Morphine Sulfate ER 60 MG Oral Tablet Extended Release (MS Contin); take 1 po every    12 hours; Therapy: 75NXR2798 to (Last Rx:08Nov2016) Ordered   13  Omeprazole 20 MG Oral Capsule Delayed Release; TAKE ONE CAPSULE BY MOUTH    EVERY NIGHT AT BEDTIME; Therapy: 24BBD9483 to (Evaluate:19Sep2017)  Requested for: 23Mar2017; Last    Rx:23Mar2017 Ordered   14  OxyCODONE HCl - 20 MG Oral Tablet; Take 1 pill every 6 hours if needed for pain; Therapy: 81TQY3531 to (Evaluate:12Nov2017); Last Rx:71Igk8846 Ordered   15  Premier Urostomy 2-1/2" Pouch Miscellaneous; USE AS DIRECTED; Therapy: 61JHT4821 to (Last Rx:13May2016)  Requested for: 94EDJ0862 Ordered   16  Premium Skin Barrier Miscellaneous; USE AS DIRECTED; Therapy: 65FRX1579 to (Last Rx:13May2016)  Requested for: 29SPE9063 Ordered   17  Santyl 250 UNIT/GM External Ointment; APPLY TO AFFECTED AREA(S) ONCE DAILY    AS DIRECTED; Therapy: 25Cru4442 to (Last Lalo Moe)  Requested for: 34MNT5140 Ordered   18  Skin Prep Wipes Miscellaneous; USE AS DIRECTED; Therapy: 34HEH3594 to (Last Rx:13May2016)  Requested for: 63TNS5345 Ordered   19  SM Rolled Gauze 3"x4 1yd Miscellaneous; USE AS DIRECTED; Therapy: 41LOR4952 to (Last Rx:13May2016)  Requested for: 48HGR6062 Ordered   20  Surgical Dressing 5"X9" Pad; USE AS DIRECTED; Therapy: 01CEZ3140 to (Last Rx:12Zpc4254)  Requested for: 70JUO7931 Ordered   21   Tegaderm + Pad 2"x2-3/4" Miscellaneous; USE AS DIRECTED; Therapy: 80UEH0942 to (Last Rx:38Pqa0058)  Requested for: 47ELG9292 Ordered   22  Underpads Extra Large Miscellaneous; change twice daily; Therapy: 10ZWC5840 to (Last Rx:07Jun2017)  Requested for: 07Jun2017 Ordered   23  Underpads Large MISC; 4 pads per month; Therapy: 47JVF9683 to (Last II:78ESX6805)  Requested for: 52WSF3541 Ordered   24  Underpads Miscellaneous; change twice weekly; Therapy: 85WGE8935 to (Last Rx:17May2017)  Requested for: 80JOZ8376 Ordered   25  Vitamin C 500 MG Oral Tablet; take 1 tablet by mouth twice daily; Therapy: 96PUB2309 to (Evaluate:21Mar2017)  Requested for: 31MUH7974; Last    Rx:47Dcz3732 Ordered   26  Vitamin D3 5000 UNIT Oral Tablet; 1 Tab daily; Therapy: 30FRQ3807 to (Last Rx:62Pty4433)  Requested for: 08AQK9728 Ordered    Allergies    1   No Known Drug Allergies    Signatures   Electronically signed by : Agusto Cornelius RN; Nov 6 2017  1:35PM EST                       (Author)

## 2018-01-18 NOTE — MISCELLANEOUS
Message  She has been having dysuria  As per Dr Alejandro Arriola disorder he came in to do a urinalysis  His was positive for leukocytes  I'm would have him start on Keflex and if there is no change in 3 days needs to return here for a visit        Plan  Urinary tract infection    · Cephalexin 500 MG Oral Capsule (Keflex); TAKE 1 CAPSULE 3 TIMES DAILY    Signatures   Electronically signed by : JOSE Dominguez; Jun 23 2017  1:08PM EST                       (Author)

## 2018-01-22 VITALS
DIASTOLIC BLOOD PRESSURE: 60 MMHG | TEMPERATURE: 97.5 F | SYSTOLIC BLOOD PRESSURE: 102 MMHG | RESPIRATION RATE: 20 BRPM | OXYGEN SATURATION: 100 % | HEART RATE: 79 BPM

## 2018-01-23 NOTE — MISCELLANEOUS
Message     Date: 07 Dec 2017 11:45 AM EST, Recorded By: Meme Lowery For: Hadley : Reyes Libman, Other   Reason: Care Coordination   Received call from Reyes Libman with wound care center  Reports that patient has increased drainage from sacral wound including 11 cm of tunneling at 1 o'clock and is wondering if we would like to see patient sooner than scheduled 1/9/18 appt  Discussed above with Dr Corazon Em, who states there is nothing more we can do from a plastic/reconstructive standpoint and does not feel a sooner appt is necessary  Information reviewed with Reyes Libman who verbalized understanding  Active Problems    1  Anemia (285 9) (D64 9)   2  Benign essential hypertension (401 1) (I10)   3  Cellulitis of right lower extremity (682 6) (L03 115)   4  Cellulitis of upper arm (682 3) (L03 119)   5  Chronic ulcer of sacral region (707 8) (L98 429)   6  Colon cancer screening (V76 51) (Z12 11)   7  Constipation (564 00) (K59 00)   8  Decubitus ulcer of left heel, stage 3 (524 61,280 97) (C90 771)   9  Decubitus ulcer of left heel, unstageable (707 07,707 25) (L89 620)   10  Decubitus ulcer of left ischium, stage 4 (624 12,232 39) (L89 324)   11  Decubitus ulcer of left knee, stage 3 (707 09,707 23) (Q63 979)   12  Decubitus ulcer of sacral region, stage 4 (707 03,707 24) (L89 154)   13  Dehiscence of surgical wound (998 32) (T81 31XA)   14  Depression screening (V79 0) (Z13 89)   15  Diabetic eye exam (V72 0,250 00) (E11 9,Z01 00)   16  DM type 2 (diabetes mellitus, type 2) (250 00) (E11 9)   17  Dyslipidemia (272 4) (E78 5)   18  Dysuria (788 1) (R30 0)   19  Edema (782 3) (R60 9)   20  Encounter for diabetic foot exam (250 00) (E11 9)   21  Mass of right upper extremity (782 2) (R22 31)   22  Need for immunization against influenza (V04 81) (Z23)   23  Need for prophylactic vaccination and inoculation against influenza (V04 81) (Z23)   24  Need for Tdap vaccination (V06 1) (Z23)   25  Neurogenic bladder (596 54) (N31 9)   26  Open wound of hip and thigh (890 0) (S71 009A,S71 109A)   27  Open wound of left knee without complication (993 1) (T96 612O)   28  Open wound of right upper arm, initial encounter (880 03) (S41 101A)   29  Other insomnia (780 52) (G47 09)   30  Pain in joint of right shoulder (719 41) (M25 511)   31  Paraplegic spinal paralysis (344 1) (G82 20)   32  Recurrent UTI (urinary tract infection) (599 0) (N39 0)   33  Screening for depression (V79 0) (Z13 89)   34  Status post flap graft (V45 89) (Z98 890)   35  Thrombocytosis (238 71) (D47 3)   36  Unspecified open wound, left ankle, sequela (906 1) (S91 002S)   37  Urinary tract infection (599 0) (N39 0)   38  Visit for pre-operative examination (V72 84) (Z01 818)    Current Meds   1  Accu-Chek FastClix Lancets Miscellaneous; TEST TWICE DAILY AS DIRECTED; Therapy: 30GKW9923 to (Evaluate:65Lnb8202)  Requested for: 78HXH0327; Last   Rx:16Nov2017 Ordered   2  Accu-Chek SmartView In [employer]; test twice daily; Therapy: 61UNU9015 to (Evaluate:21Hzs1423)  Requested for: 73FLI5465; Last   Rx:16Nov2017 Ordered   3  Aspirin EC Low Dose 81 MG Oral Tablet Delayed Release; take 1 tablet by mouth every   day; [facility]WKU1438 [facility]  Requested for: 16TPJ6615; Last   Rx:30Nov2017 Ordered   4  Cephalexin 500 MG Oral Capsule (Keflex); TAKE 1 CAPSULE 3 TIMES DAILY; Therapy: 69PEI2444 to (Evaluate:30Jun2017)  Requested for: 12JOA4909; Last   RJ:05ZAZ3664 Ordered   5  Disposable Underpads 30"x36" Miscellaneous; change upto 8 times a day; Therapy: 18NZS0143 to (Last Rx:03Nnv0355)  Requested for: 18MJZ0487 Ordered   6  Ferrous Sulfate 325 (65 Fe) MG Oral Tablet; TAKE 1 TABLET 2 TIMES DAILY AFTER   MEALS; Therapy: 35WKL4437 to (Evaluate:16Jfn5144)  Requested for: 24Wco2779; Last   Rx:08Nov2016 Ordered   7  Gauze Pads 4"X4" Pad; use to cover wound change daily;    Therapy: 92FOQ7653 to (Last Rx:55Gco0797)  Requested for: 50DGW7558 Ordered   8  Glucerna Oral Liquid; drink 3 cans per day; Therapy: 69GZA5700 to (Last Rx:17May2017)  Requested for: 86Psg7611 Ordered   9  Glucerna Oral Liquid; drink 3 cans per day; Therapy: 84KFH6065 to (Last Rx:05Jan2017)  Requested for: 16IQI6577 Ordered   10  Ibuprofen 800 MG Oral Tablet; TAKE ONE TABLET BY MOUTH EVERY DAY AS    NEEDED; Therapy: 65AIJ3085 to ([phone])  Requested for: 18PAK6702; Last    Rx:30Nov2017 Ordered   11  Lidocaine HCl GEL; 2%gel applied to wound/s prior to debridement  at the [place] [address] for pain; Therapy: (Aurora Heller) to Recorded   12  Morphine Sulfate ER 60 MG Oral Tablet Extended Release (MS Contin); take 1 po every    12 hours; Therapy: 31JXO5272 to (Last Rx:08Nov2016) Ordered   13  Omeprazole 20 MG Oral Capsule Delayed Release; TAKE ONE CAPSULE BY MOUTH    EVERY NIGHT AT BEDTIME; Therapy: 37MMU8033 to (Evaluate:18Nlj5675)  Requested for: 23Mar2017; Last    Rx:23Mar2017 Ordered   14  OxyCODONE HCl - 20 MG Oral Tablet; Take 1 pill every 6 hours if needed for pain; Therapy: 40MFW5259 to (Evaluate:20Ggo5880); Last Rx:10Nov2017 Ordered   15  Premier Urostomy 2-1/2" Pouch Miscellaneous; USE AS DIRECTED; Therapy: 10CID3465 to (Last Rx:13May2016)  Requested for: 89YGG9923 Ordered   16  Premium Skin Barrier Miscellaneous; USE AS DIRECTED; Therapy: 11TOC4932 to (Last Rx:13May2016)  Requested for: 00IKW7469 Ordered   17  Santyl 250 UNIT/GM External Ointment; APPLY TO AFFECTED AREA(S) ONCE DAILY    AS DIRECTED; Therapy: 48Hhs2651 to (Last Apolinar Boyle)  Requested for: 07XJL5952 Ordered   18  Skin Prep Wipes Miscellaneous; USE AS DIRECTED; Therapy: 48GOT4289 to (Last Rx:13May2016)  Requested for: 97ZNM6908 Ordered   19  SM Rolled Gauze 3"x4 1yd Miscellaneous; USE AS DIRECTED; Therapy: 52NNW0821 to (Last Rx:89Uhs5478)  Requested for: 33CCW8025 Ordered   20  Surgical Dressing 5"X9" Pad; USE AS DIRECTED;     Therapy: 87LVG5294 to (Last Rx:13May2016)  Requested for: 75SAG1855 Ordered   21  Tegaderm + Pad 2"x2-3/4" Miscellaneous; USE AS DIRECTED; Therapy: 32RWI9158 to (Last Rx:45Oat7658)  Requested for: 58AXW5300 Ordered   22  Underpads Extra Large Miscellaneous; change twice daily; Therapy: 47OGO1057 to (Last Rx:07Jun2017)  Requested for: 07Jun2017 Ordered   23  Underpads Large MISC; 4 pads per month; Therapy: 41EMT9251 to (Last KS:30IMW1690)  Requested for: 26UGU6501 Ordered   24  Underpads Miscellaneous; change twice weekly; Therapy: 38ZXE9402 to (Last Rx:17May2017)  Requested for: 73BNO9137 Ordered   25  Vitamin C 500 MG Oral Tablet; take 1 tablet by mouth twice daily; Therapy: 81ZXF6486 to (Evaluate:21Mar2017)  Requested for: 54LOF2287; Last    Rx:05Iqd7167 Ordered   26  Vitamin D3 5000 UNIT Oral Tablet; 1 Tab daily; Therapy: 36WGM0880 to (Last Rx:97Efx0411)  Requested for: 30ZNH1047 Ordered    Allergies    1   No Known Drug Allergies    Signatures   Electronically signed by : Salvador Reyes RN; Dec  7 2017 11:50AM EST                       (Author)

## 2018-01-24 ENCOUNTER — APPOINTMENT (OUTPATIENT)
Dept: WOUND CARE | Facility: HOSPITAL | Age: 58
End: 2018-01-24
Payer: MEDICARE

## 2018-01-24 PROCEDURE — 99214 OFFICE O/P EST MOD 30 MIN: CPT | Performed by: FAMILY MEDICINE

## 2018-01-24 NOTE — PROGRESS NOTES
Assessment    1  Decubitus ulcer of left ischium, stage 4 (438 49,847 42) (L89 324)   2  Decubitus ulcer of sacral region, stage 4 (707 03,707 24) (L89 154)   3  Decubitus ulcer of left heel, unstageable (566 52,188 80) (L89 620)   4  Unspecified open wound, left ankle, sequela (906 1) (S91 002S)    Plan  Decubitus ulcer of left ischium, stage 4, Decubitus ulcer of sacral region, stage 4    · * XR HIPS BILATERAL 2 VW W PELVIS IF PERFORMED; Status:Active; Requested  for:26Vmo7422;    Perform:[facility]; Order Comments:r/o osteo; KGT:[id number];SBNTUZI; For:Decubitus ulcer of left ischium, stage 4, Decubitus ulcer of sacral region, stage 4; Ordered By: Yrn Matson;   · * XR SACRUM AND COCCYX; Status:Active; Requested for:64Gsu9977;    Perform:[facility]; MYI:[id number];OFEQXBR; For:Decubitus ulcer of left ischium, stage 4, Decubitus ulcer of sacral region, stage 4; Ordered By: Yrn Matson;   · 1 - Cas DIAZ, [facility] Physician Referral  Consult Only:  the expectation is that the referring provider will communicate back to the patient on  treatment options  Evaluation and Treatment: the expectation is that the referred to  provider will communicate back to the patient on treatment options  Status: Active   Requested for: 15Yvu3014   Ordered; For: Decubitus ulcer of left ischium, stage 4, Decubitus ulcer of sacral region, stage 4; Ordered By: Yrn Matson Performed:  Due: 35NUK3527  Care Summary provided  : Yes   · Decubitus Bed; Status:Complete;   Done: 07IBF5252   Perform:Not Applicable; Order Comments:needs group 3 mattress; GONSALO:[id number];WTCPKKM; For:Decubitus ulcer of left ischium, stage 4, Decubitus ulcer of sacral region, stage 4; Ordered By: Yrn Matson; Wound Care Orders/Instructions    Wound Identification and Instructions   Wound #1: Left Ischium    Wound Care Instructions  Discussed with Patient/Caregiver  Shane Monroy     Dressing Type: Hydrogel  Wash with mild soap and water, normal saline, wound cleanser  As specified, use: NSS  Apply 4% Topical Lidocaine anesthetic solution PRN to wound/ulcer prior to debridement for pain control  Apply specified dressing to wound base/bed  Secondary dressing apply: Gauze, juan, ABD  Secure with: Tape, medfix  Dressing change frequency: Daily, and top dressing PRN  Pressure redistribution: Group II mattress, w/c cushion  Comments/Other:   coat juan with hydrogel and loosely pack into wound    cover with gauze and ABDs  Pt given orders to have xrays performed to hips bilaterally, sacrum and coccyx  Pt given orders to see a plastic surgeon to discuss if a flap can be performed  Pt also given ordered for a decubitus bed  Wound #2: sacrum    Wound Care Instructions  Discussed with Patient/Caregiver  Dressing Type: Hydrogel  Wash with mild soap and water, normal saline, wound cleanser  As specified, use: NSS  Apply 4% Topical Lidocaine anesthetic solution PRN to wound/ulcer prior to debridement for pain control  Apply specified dressing to wound base/bed  Secondary dressing apply: Gauze, ABD, juan  Secure with: Tape, medfix  Dressing change frequency: Daily, and PRN  Pressure redistribution: Group II mattress, no pressure to sacrum  Comments/Other:   coat juan with hydrogel and loosely pack into wound    cover with gauze and ABDs   Wound #3: L Heel   Wound Care Instructions  Discussed with Patient/Caregiver  Shane Monroy  Dressing Type: Collagenase (Santyl)  Wash with mild soap and water, normal saline, wound cleanser  As specified, use: NSS  Apply 4% Topical Lidocaine anesthetic solution PRN to wound/ulcer prior to debridement for pain control  Apply specified dressing to wound base/bed     Secondary dressing apply: Meplex border  Secure with: Juan  Dressing change frequency: Daily  Offloading: Surgical shoeto-- L heel; no pressure to L heel   Wound #4: R arm- healed 9/28/16   Wound Care Instructions  Discussed with Patient/Caregiver  Comments/Other:   wound is healed no dressing needed   Wound #5: L med ankle   Wound Care Instructions  Discussed with Patient/Caregiver  Shane Eliana  Wash with mild soap and water, normal saline, wound cleanser  Apply 4% Topical Lidocaine anesthetic solution PRN to wound/ulcer prior to debridement for pain control  Apply specified dressing to wound base/bed  Secondary dressing apply: Meplex border  Comments/Other:   Pt given orders to have xrays performed to hips bilaterally, sacrum and coccyx  Pt given orders to see a plastic surgeon to discuss if a flap can be performed  Pt also given ordered for a decubitus bed  Wound Goals  Wound Goals:   Healing Goals:   Guarded wound healing potential due to lack of wound care adherence and comorbidities  Wound depth will decrease by 25%   Patient will achieve appropriate pressure redistribution for wound prevention       Discussion/Summary    Has extensive wounds to ischium/sacrum although very clean  recommend group 3 mattress  The patient is currently on a Group 2, pressure relieving support surface and their wound is continuing to deteriorate  The air fluidized bed and wound therapy discussed with the patient and is in agreement  Nutrition has been optimized over the course of wound treatment  A regular re-positioning schedule has been followed to alleviate pressure on the affected areas  This patient must be placed on an air fluidized bed to begin healing and prevent new wounds from developing  In the absence of an air-fluidized bed, the patient would require institutionalization  patient interested in seeing plastics for any further surgical options  L heel/medial ankle wounds - no signs of infection  recommend prevalon boots and foam dressing to both wounds  f/u 4 weeks  Chief Complaint  Follow up visit for patient at [facility] for L ischium, sacrum  L heel and R arm wounds  History of Present Illness    Wound Identification HPI   Wound #1: L Ischium    Wound #2: Sacrum (2 areas)    Wound #3: L Heel    Wound #4: R arm (2 areas- upper and antecubital)          The patient came to Wound Care via wheelchair  The patient is being seen for a follow-up with MD at the [address] [place] [address]  The patient's identification was verified  A secondary verification process was completed  Orientation: oriented to person, oriented to place and oriented to time  Blood Glucose:  Not applicable  8/24/16 Patient arrives in motorized chair and self transfers to exam bed  Patient has been treated for the past year by Angelia robertson plastic surgeon at [facility]   L heel probes to bone    Tissue red at ischial and sacral wound  Patient is a paraplegic T 5,T6 with partial sensation and use of both hands,Skin grafting done 9/15  Sacral wound probes to bone Patient is a right BKA  Wet to dry dressings to sacral and ischial wounds   A and D ointment to L heel  New ROHO cushion and drinks Glucerna 1to 2 x daily  9-14-16 Arrived today with drerssings intact to sacrum, ischium and L heel wounds  Presents with new wounds (2) on the R arm (upper and antecubital)  There are no dressings in place to the arm wounds but upper wound is draining clear fluid  Patient reports that R arm wounds began about 2 weeks ago after he heard a "cracking" in the bone  The arm swelled and he felt discomfort in the bone  The wounds opened up about 1 week ago  The upper arm is erythematous, painful and edematous  He reports feeling like he has had "fever" off an on at home but has not checked temperature  Denies chills but reports "sweating" on occasion  Temp today tympanically is 99 1   9-28-16 Patient arrived today with dressings intact to L heel, L ischium and sacral wounds  The wounds on the R are are now healed and covered with dry eipthlium  The upper arm remains erythematous, however   The patient was seen in the ED following his last visit here for the R arm pain and erythema  He reports the R shoulder was reduced and injected  He was instructed to see how it is "in a month" and if not OK, then to go to orthopedics  He followed up with his PCP for the R upper arm swelling and erythema and was started on cephalexin, which he will complete today  He offers no complaints today   12/28/16 Pt arrives in wheelchair with dressing on L ischium, and sacrum, no dressing on L heel  History of Falling: Yes = 25   Secondary Diagnosis: Yes = 15   Ambulatory Aid None, Bedrest, Nurse Assist = 0   No = 0   Gait: Impaired = 20 -- Short steps with shuffle, may have difficulty arising from chair, head down, significantly impaired balance, requiring furniture, support person, or walking aid to walk  Mental Status: Oriented to own ability = 0   Total Score:     > 45 = High Risk         Fredi Scale:   Sensory Perception: Slightly limited = 3   Moisture: Occasionally moist = 3   Activity: Walks occasionally = 3   Mobility: Slightly limited = 3   Nutrition: Adequate = 3   Friction and Shear: Potential problem = 2   Total Fredi Score: 17       The most recent fall occurred Denies falls since 12/2/16  Number of falls in the last year were 0  Provider Wound Care HPI: f/u for multiple wounds  no new issues  has home care  is on "air mattress"  has cushion on wheelchair  is interested in seeing plastics for possible flap         Pain Assessment   the patient states they have pain  The pain is located in the sacrum area  (on a scale of 0 to 10, the patient rates the pain at 8 )   Abuse And Domestic Violence Screen    Yes, the patient is safe at home  The patient states no one is hurting them  Depression And Suicide Screen  No, the patient has not had thoughts of hurting themself  No, the patient has not felt depressed in the past 7 days  Prefered Language is  [place]  Primary Language is  Albanian  Readiness To Learn: Receptive  Barriers To Learning: none  Preferred Learning: verbal   Education Completed: treatment/procedure   Teaching Method: verbal   Person Taught: patient   Evaluation Of Learning: verbalized/demonstrated understanding      Review of Systems    Constitutional: no fever or chills, feels well, no tiredness, no recent weight loss or weight gain  Cardiovascular: no complaints of slow or fast heart rate, no chest pain, no palpitations, no leg claudication or lower extremity edema  Respiratory: no complaints of shortness of breath, no wheezing or cough, no dyspnea on exertion, no orthopnea or PND  Active Problems    1  Anemia (285 9) (D64 9)   2  Benign essential hypertension (401 1) (I10)   3  Cellulitis of right lower extremity (682 6) (L03 115)   4  Cellulitis of upper arm (682 3) (L03 119)   5  Chronic ulcer of sacral region (707 8) (L98 499)   6  Colon cancer screening (V76 51) (Z12 11)   7  Constipation (564 00) (K59 00)   8  Decubitus ulcer of left heel, stage 3 (161 38,239 35) (M31 695)   9  Decubitus ulcer of left ischium, stage 4 (760 63,273 42) (L89 324)   10  Decubitus ulcer of sacral region, stage 4 (707 03,707 24) (L89 154)   11  Diabetic eye exam (V72 0,250 00) (E11 9,Z01 00)   12  DM type 2 (diabetes mellitus, type 2) (250 00) (E11 9)   13  Dyslipidemia (272 4) (E78 5)   14  Edema (782 3) (R60 9)   15  Encounter for diabetic foot exam (250 00) (E11 9)   16  Mass of right upper extremity (782 2) (R22 31)   17  Need for immunization against influenza (V04 81) (Z23)   18  Need for prophylactic vaccination and inoculation against influenza (V04 81) (Z23)   19  Need for Tdap vaccination (V06 1) (Z23)   20  Neurogenic bladder (596 54) (N31 9)   21  Open wound of hip and thigh (890 0) (S71 009A,S71 109A)   22  Open wound of right upper arm, initial encounter (880 03) (S41 101A)   23  Other insomnia (780 52) (G47 09)   24  Pain in joint of right shoulder (719 41) (M25 511)   25   Paraplegic spinal paralysis (344  1) (G82 20)   26  Screening for depression (V79 0) (Z13 89)   27  Thrombocytosis (238 71) (D47 3)   28  Urinary tract infection (599 0) (N39 0)    Past Medical History    1  History of Clostridium difficile infection (V12 09) (Z86 19)   2  Denied: History of drug abuse   3  History of fever (V13 89) (Z87 898)   4  Denied: History of mental disorder   5  History of Meningitis (V12 42)   6  History of Poorly controlled type 2 diabetes mellitus (250 00) (E11 65)    The active problems and past medical history were reviewed and updated today  Surgical History    1  History of Amputation At Hip   2  History of Bladder Surgery   3  History of Lower Back Surgery    The surgical history was reviewed and updated today  Family History    1  Denied: Family history of Drug abuse   2  No family history of mental disorder   3  Patient's father is  (V19 8) (Z80 80)   4  Patient's mother is  (V19 8) (Z80 80)    5  Denied: Family history of Drug abuse   6  No family history of mental disorder   7  Patient's father is  (V19 8) (Z80 80)   6  Patient's mother is  (V19 8) (Z80 80)    5  Family history of No Significant Family History    Social History    · Being A Social Drinker   · Former smoker (P04 89) (T17 206)   · Native Language Ukrainian   · No drug use   [place]   · Social history reviewed, unchanged  The social history was reviewed and updated today  The social history was reviewed and is unchanged  Current Meds   1  Accu-Chek FastClix Lancets Miscellaneous; TEST TWICE DAILY AS DIRECTED; Therapy: 06CDM8279 to [phone]  Requested for: 96DLI2597; Last   Rx:2016 Ordered   2  Accu-Chek SmartView In [employer]; test twice daily; Therapy: 55PWA8078 to [phone]  Requested for: 18CEF5952; Last   Rx:2016 Ordered   3  Aspirin EC Low Dose 81 MG Oral Tablet Delayed Release; take 1 tablet by mouth every   day;    Therapy: 23RVX6120 to (Evaluate:72Toq0795)  Requested for: 55BZV5726; Last   Rx:29Jun2016 Ordered   4  Cephalexin 500 MG Oral Capsule; TAKE 1 CAPSULE 4 TIMES DAILY UNTIL GONE;   Therapy: 28EVJ1573 to (Evaluate:09Oct2016)  Requested for: 02GMW1996; Last   Rx:29Sep2016 Ordered   5  Ciprofloxacin HCl - 250 MG Oral Tablet; take 1 tablet every twelve hours; Therapy: 62WKU1226 to (Evaluate:13Tly3491)  Requested for: 52BSK3620; Last   Rx:29Nov2016 Ordered   6  Disposable Underpads 30"x36" Miscellaneous; change upto 8 times a day; Therapy: 81LJL3781 to (Last Rx:73Jqc8960)  Requested for: 19LPE5272 Ordered   7  Ferrous Sulfate 325 (65 Fe) MG Oral Tablet; TAKE 1 TABLET 2 TIMES DAILY AFTER   MEALS; Therapy: 35PZX2378 to (Evaluate:72Qfe4083)  Requested for: 62DEI4882; Last   Rx:08Nov2016 Ordered   8  Gauze Pads 4"X4" Pad; use to cover wound change daily; Therapy: 23CME4943 to (Last Rx:30Ugm7206)  Requested for: 93AFD4345 Ordered   9  Glucerna Oral Liquid; 1 can prn daily when no appetite; Therapy: 76TLS4841 to (Last Rx:87Hpa1403)  Requested for: 81HRW6962 Ordered   10  Ibuprofen 800 MG Oral Tablet; TAKE 1 TABLET BY MOUTH EVERY DAY AS NEEDED; Therapy: 89GEH0023 to (Nirav Cordova)  Requested for: 27Xdx7344; Last    Rx:18Hxe3267 Ordered   11  Medipore Surgical 2"x2yd TAPE; USE AS DIRECTED; Therapy: 39VII3828 to (Last Rx:96Wkt3962)  Requested for: 84FJO4756 Ordered   12  Morphine Sulfate ER 60 MG Oral Tablet Extended Release; take 1 po every 12 hours; Therapy: 76IRE5893 to (Last Rx:08Nov2016) Ordered   13  Omeprazole 20 MG Oral Capsule Delayed Release; TAKE ONE CAPSULE BY MOUTH    EVERY NIGHT AT BEDTIME; Therapy: 76OKN5882 to (Evaluate:21Mar2017)  Requested for: 08FNN3765; Last    Rx:67Vqy0447 Ordered   14  OxyCODONE HCl - 20 MG Oral Tablet; Take 1 pill every 6 hours if needed for pain; Therapy: 26SRS3415 to (Evaluate:14Jan2017); Last Rx:43Aca1437 Ordered   15   Premier Urostomy 2-1/2" Pouch Miscellaneous; USE AS DIRECTED; Therapy: 65QQD3618 to (Last Rx:13May2016)  Requested for: 90CHR0969 Ordered   16  Premium Skin Barrier Miscellaneous; USE AS DIRECTED; Therapy: 61VDK8104 to (Last Rx:13May2016)  Requested for: 64LBY7251 Ordered   17  Santyl 250 UNIT/GM External Ointment; APPLY TO AFFECTED AREA(S) ONCE DAILY AS    DIRECTED; Therapy: 52Fva2919 to (Last Georgiamatty Britney)  Requested for: 55DVM7972 Ordered   18  Skin Prep Wipes Miscellaneous; USE AS DIRECTED; Therapy: 19YUN3833 to (Last Rx:13May2016)  Requested for: 53OQG8077 Ordered   19  SM Rolled Gauze 3"x4 1yd Miscellaneous; USE AS DIRECTED; Therapy: 36LDN7937 to (Last Rx:13May2016)  Requested for: 99TAI4799 Ordered   20  Surgical Dressing 5"X9" Pad; USE AS DIRECTED; Therapy: 87ZSP2961 to (Last Rx:13May2016)  Requested for: 83AFJ2256 Ordered   21  Tegaderm + Pad 2"x2-3/4" Miscellaneous; USE AS DIRECTED; Therapy: 85KYH4036 to (Last Rx:13May2016)  Requested for: 91SAL1290 Ordered   22  Underpads Large Miscellaneous; 4 pads per month; Therapy: 56FEP6666 to (Last NS:80GKF0534)  Requested for: 78RTY1392 Ordered   23  Vitamin C 500 MG Oral Tablet; take 1 tablet by mouth twice daily; Therapy: 37KWI9141 to (Evaluate:21Mar2017)  Requested for: 26ARA7887; Last    Rx:37Tgp1963 Ordered   24  Vitamin D3 5000 UNIT Oral Tablet; 1 Tab daily; Therapy: 36XUN4601 to (Last Rx:68Ukk8666)  Requested for: 33DPZ5992 Ordered   25  Zolpidem Tartrate 5 MG Oral Tablet; TAKE 1 TABLET AT BEDTIME AS NEEDED; Therapy: 90BWA3109 to (Evaluate:18Apr2016); Last Rx:19Jan2016 Ordered    The medication list was reviewed and updated today  Allergies    1  No Known Drug Allergies    Vitals  Vital Signs    Recorded: 27Ssb2586 09:08AM   Temperature 98 4 F   Heart Rate 108   Respiration 16   Systolic 460   Diastolic 68   Height 5 ft 7 in   Weight 180 lb    BMI Calculated 28 19   BSA Calculated 1 93     Physical Exam    Wound #1 Assessment wound #1 Location:, L ischium  Wound Status: not healed  Pressure Ulcer Grade: Stage IV  Length: 5 5cm x Width: 6 5cm x Depth: 2 5cm   Total: 35 75sq cm   Wound Volume: 89 375cm3       Undermining 3 3cm from 6-3 o'clock to     probes to bone   Tissue type: Subcutaneous and probes to bone   Color of Wound: Yellow - 5% and Pink - 95%   Exudate Amount: Large   Exudate Type: Serosangiunous   Odor: None   Exudate Color: pink   Wound Edges: Intact and detached   Periwound Skin Condition: Intact, Hemosiderin pigmentation, scar tissue   Comments: wound probes to bone, undermining is almost circumferential (6-3 o"clock)  Physician/Provider Wound #1 Exam   I agree with the nursing assessment and documentation  Wound #2 Assessment wound #2 Location:, Sacrum, Care for this wound started on 8/24/16  Wound Status: not healed  Length: 16cm x Width: 6 5cm x Depth: 5 2cm   Total: 104sq cm   Wound Volume: 540 8cm3   Tunneling 2 6cm at 6 o'clock          Tissue type: Subcutaneous and probes to bone   Color of Wound: Yellow - 5%   Exudate Amount: Large   Exudate Type: Serous   Odor: None   Exudate Color: pink   Wound Edges: Intact, Rolled (epibolized) and irregular   Periwound Skin Condition: Intact   Comments: (two areas)  Physician/Provider Wound #2 Exam   I agree with the nursing assessment and documentation  Wound #3 Assessment wound #3 Location:, L heel  Care for this wound started on 8/24/16   Wound Status: not healed  PressureUlcer Grade: Unstageable  Length: 1 4cm x Width: 1 5cm x Depth: 0cm   Total: 2 1sq cm   Wound Volume: 0cm3           Tissue type: Granulation, Slough and thickened edges   Color of Wound: Yellow - and Black - 100%   Exudate Amount: None   Odor: None   Exudate Color: Yellow   Wound Edges: Intact and thickened   Periwound Skin Condition: Intact, Hemosiderin pigmentation, Scaly   Comments: wound has improved  Physician/Provider Wound #3 Exam   I agree with the nursing assessment and documentation         Wound #5 Assessment wound #5 Location:, L med ankle, Care for this wound started on 12/28/16  Wound Status: not healed  Length: 0 4cm x Width: 0 8cm x Depth: 0 1cm   Total: 0 32sq cm   Wound Volume: 0 032cm3           Tissue type: Slough   Color of Wound: Yellow - 10% and Pink - 90%   Exudate Amount: Scant   Exudate Type: Serosangiunous   Exudate Color: Jolena Irish   Wound Edges: Intact   Periwound Skin Condition: Intact            Physician/Provider Wound #5 Exam   I agree with the nursing assessment and documentation  Abbey Jerome 1: Wound Nursing Care Plan   Problem: multiple pressure ulcers   Impaired Tissue Integrity related to:   Knowledge Deficit Related To:   Risk for Infection related to open wound:   Imbalanced Nutrition, less than body requirements related to inadequate intake and/or disease process:   Impaired Mobility related to: paraplegia   Self Care Deficit related to wound dressing changes:   Ineffective Adherence to the Plan of Care related to:   Goals   Patient will demonstrate and verbalize knowledge of their disease process and management:  Patient will verbalize signs and symptoms of infection and when to notify physician or [facility]:    Patient will verbalize knowledge of and demonstrate adherence to interventions to achieve optimal nutrition required for wound healing:  Patient or caregiver will demonstrate ability to perform wound dressing changes:  Patient will maintain or achieve adequate hydration:  Other Goals: patient will achieve optimal offloading and positioning for adequate pressure redistribution for all wounds  Wound Nursing Care Interventions:   Provide moist wound healing:  Evaluate current medication regime for medications which may slow/impede wound healing:  Implement offloading measures (turn & reposition schedule/method, ortho shoes/boots, floating heels, crutches, specialty surfaces:     Teach and evaluate effectiveness of offloading measures:  Teach patient and/or family about disease process and management methods:  Assess patient's nutrition on each wound care visit (including protein and fluid intake): Yola Bush Assess mobility and how it may affect wound healing:  Teach patient and caregiver how to change wound dressing:  Assess for patient compliance to established plan of care: Yola Bush Other:  Care plan initiated 8/24/16, reviewed and updated 9-14-16      Procedure      Wound #1: L ischium     Nurse Dressing Change:   Wound #1  The wound located on the L ischium  Applied 4% topical Lidocaine solution to wound/ulcer prior to debridement for pain control  Wound care rendered as per Physician/Advanced Practitioner order/plan  Order sent to Home Care  Return to [address] [place] [address] 4 weeks  Comments:    Removal Devitalized Tissue: Prior to the procedure, the patient was identified using two identifiers, the general consent was signed, the proper site of procedure was identified, and a time out was taken  Due to the presence of senescent cells and/or biofilm in the wound, a medical decision was made to perform a selective debridement  Anesthesia: Local anesthesia with 4% topical lidocaine was utilized prior to the procedure for pain control  A curette was utilized to debride non-viable tissue  The non-viable tissue was removed  There was no bleeding  The wound measurements remained unchanged  The total surface area that was selectively debrided for the above site was 35sq cm  Codes:   83254 Removal Of Devitalized Tissue First 20 sq cm or less   81548 Removal Of Devitalized Tissue Each Additional 20 sq cm Or Part Thereof - # of Units 1  Wound #2: Sacrum     Nurse Dressing Change:   Wound #2 The wound located on the Sacrum (two areas)  Applied 4% topical Lidocaine solution to wound/ulcer prior to debridement for pain control  Wound care rendered as per Physician/Advanced Practitioner order/plan      Order sent to Home Care  Return to [address] [place] [address] 4 weeks  Comments:    Removal Devitalized Tissue: Prior to the procedure, the patient was identified using two identifiers, the general consent was signed, the proper site of procedure was identified, and a time out was taken  Due to the presence of senescent cells and/or biofilm in the wound, a medical decision was made to perform a selective debridement  Anesthesia: Local anesthesia with 4% topical lidocaine was utilized prior to the procedure for pain control  A curette was utilized to debride non-viable tissue  The non-viable tissue was removed  There was no bleeding  The wound measurements remained unchanged  The total surface area that was selectively debrided for the above site was 104sq cm  Codes:   56428 Removal Of Devitalized Tissue First 20 sq cm or less   [address] Removal Of Devitalized Tissue Each Additional 20 sq cm Or Part Thereof - # of Units 5  Wound #3: L heel     Nurse Dressing Change:   Wound #3 The wound located on the L heel  Applied 4% topical Lidocaine solution to wound/ulcer prior to debridement for pain control  Wound care rendered as per Physician/Advanced Practitioner order/plan  Order sent to Home Care  Return to [address] [place] [address] 4 weeks  Comments:   Wound #5: L med ankle     Nurse Dressing Change:   Wound #5 The wound located on the L med ankle  Applied 4% topical Lidocaine solution to wound/ulcer prior to debridement for pain control  Wound care rendered as per Physician/Advanced Practitioner order/plan  Order sent to Home Care  Return to [address] [place] [address] 4 weeks    Comments:      Future Appointments    Date/Time Provider Specialty Site   01/25/2017 09:30 AM Kanu Amato DO [facility]     Signatures   Electronically signed by : Yumiko Win DO; Dec 28 2016 12:06PM EST                       (Author)    Electronically signed by : Yumiko Win DO; Dec 29 2016 8:55AM EST                       (Author)    Electronically signed by : Jason Garza DO; Jan 4 2017  1:03PM EST                       (Author)

## 2018-02-02 DIAGNOSIS — M54.50 CHRONIC LOW BACK PAIN WITHOUT SCIATICA, UNSPECIFIED BACK PAIN LATERALITY: Primary | ICD-10-CM

## 2018-02-02 DIAGNOSIS — G89.29 CHRONIC LOW BACK PAIN WITHOUT SCIATICA, UNSPECIFIED BACK PAIN LATERALITY: Primary | ICD-10-CM

## 2018-02-02 RX ORDER — IBUPROFEN 800 MG/1
TABLET ORAL
Qty: 60 TABLET | Refills: 0 | Status: SHIPPED | OUTPATIENT
Start: 2018-02-02 | End: 2018-04-03 | Stop reason: SDUPTHER

## 2018-02-06 ENCOUNTER — TELEPHONE (OUTPATIENT)
Dept: FAMILY MEDICINE CLINIC | Facility: CLINIC | Age: 58
End: 2018-02-06

## 2018-02-07 DIAGNOSIS — G82.20 PARAPLEGIC SPINAL PARALYSIS (HCC): Primary | ICD-10-CM

## 2018-02-07 RX ORDER — OXYCODONE HYDROCHLORIDE 20 MG/1
20 TABLET ORAL EVERY 6 HOURS PRN
Qty: 120 TABLET | Refills: 0 | Status: SHIPPED | OUTPATIENT
Start: 2018-02-07 | End: 2018-03-08 | Stop reason: SDUPTHER

## 2018-03-08 DIAGNOSIS — G82.20 PARAPLEGIC SPINAL PARALYSIS (HCC): ICD-10-CM

## 2018-03-08 RX ORDER — OXYCODONE HYDROCHLORIDE 20 MG/1
20 TABLET ORAL EVERY 6 HOURS PRN
Qty: 120 TABLET | Refills: 0 | Status: SHIPPED | OUTPATIENT
Start: 2018-03-08 | End: 2018-04-23 | Stop reason: SDUPTHER

## 2018-03-13 ENCOUNTER — TELEPHONE (OUTPATIENT)
Dept: FAMILY MEDICINE CLINIC | Facility: CLINIC | Age: 58
End: 2018-03-13

## 2018-03-23 ENCOUNTER — TELEPHONE (OUTPATIENT)
Dept: FAMILY MEDICINE CLINIC | Facility: CLINIC | Age: 58
End: 2018-03-23

## 2018-04-03 DIAGNOSIS — G89.29 CHRONIC LOW BACK PAIN WITHOUT SCIATICA, UNSPECIFIED BACK PAIN LATERALITY: ICD-10-CM

## 2018-04-03 DIAGNOSIS — M54.50 CHRONIC LOW BACK PAIN WITHOUT SCIATICA, UNSPECIFIED BACK PAIN LATERALITY: ICD-10-CM

## 2018-04-03 RX ORDER — IBUPROFEN 800 MG/1
TABLET ORAL
Qty: 60 TABLET | Refills: 0 | Status: SHIPPED | OUTPATIENT
Start: 2018-04-03 | End: 2018-05-24 | Stop reason: SDUPTHER

## 2018-04-10 ENCOUNTER — TELEPHONE (OUTPATIENT)
Dept: FAMILY MEDICINE CLINIC | Facility: CLINIC | Age: 58
End: 2018-04-10

## 2018-04-10 NOTE — TELEPHONE ENCOUNTER
I called patient and let him know that he is past due for his f/u pain med  Pt is aware of that , Pt also states has issues with [facility], He went for an evaluation 2 weeks ago and waiting for their approval, pt would liike to come but can't for now  Are you able to help the patient?

## 2018-04-19 ENCOUNTER — APPOINTMENT (OUTPATIENT)
Dept: WOUND CARE | Facility: HOSPITAL | Age: 58
End: 2018-04-19
Payer: MEDICARE

## 2018-04-19 PROCEDURE — 11042 DBRDMT SUBQ TIS 1ST 20SQCM/<: CPT | Performed by: FAMILY MEDICINE

## 2018-04-19 PROCEDURE — 99214 OFFICE O/P EST MOD 30 MIN: CPT | Performed by: FAMILY MEDICINE

## 2018-04-20 ENCOUNTER — TELEPHONE (OUTPATIENT)
Dept: FAMILY MEDICINE CLINIC | Facility: CLINIC | Age: 58
End: 2018-04-20

## 2018-04-20 NOTE — TELEPHONE ENCOUNTER
visiting nurses is calling stating pt had a elevated heart rate of 120 and 90/56 blood pressure    She is concerns since this isn't the first time its been elevated      4/16/18  112/60 blood pressure heart rate 74  4/14/18   106/80 blood pressure heart rate 120    Pt will be here Monday with you at 10am

## 2018-04-23 ENCOUNTER — OFFICE VISIT (OUTPATIENT)
Dept: FAMILY MEDICINE CLINIC | Facility: CLINIC | Age: 58
End: 2018-04-23
Payer: MEDICARE

## 2018-04-23 ENCOUNTER — TRANSCRIBE ORDERS (OUTPATIENT)
Dept: LAB | Facility: CLINIC | Age: 58
End: 2018-04-23

## 2018-04-23 VITALS
DIASTOLIC BLOOD PRESSURE: 68 MMHG | OXYGEN SATURATION: 99 % | RESPIRATION RATE: 16 BRPM | SYSTOLIC BLOOD PRESSURE: 106 MMHG | HEART RATE: 99 BPM | TEMPERATURE: 98.9 F

## 2018-04-23 DIAGNOSIS — F41.9 ANXIETY: ICD-10-CM

## 2018-04-23 DIAGNOSIS — G82.20 PARAPLEGIC SPINAL PARALYSIS (HCC): ICD-10-CM

## 2018-04-23 DIAGNOSIS — Z86.39 HISTORY OF DIABETES MELLITUS: ICD-10-CM

## 2018-04-23 DIAGNOSIS — D64.9 ANEMIA, UNSPECIFIED TYPE: ICD-10-CM

## 2018-04-23 DIAGNOSIS — Z12.11 COLON CANCER SCREENING: ICD-10-CM

## 2018-04-23 DIAGNOSIS — R30.0 DYSURIA: ICD-10-CM

## 2018-04-23 LAB
CREAT UR-MCNC: 76.8 MG/DL
MICROALBUMIN UR-MCNC: 43.3 MG/L (ref 0–20)
MICROALBUMIN/CREAT 24H UR: 56 MG/G CREATININE (ref 0–30)
SL AMB  POCT GLUCOSE, UA: NEGATIVE
SL AMB LEUKOCYTE ESTERASE,UA: ABNORMAL
SL AMB POCT BILIRUBIN,UA: NEGATIVE
SL AMB POCT BLOOD,UA: ABNORMAL
SL AMB POCT CLARITY,UA: ABNORMAL
SL AMB POCT COLOR,UA: ABNORMAL
SL AMB POCT KETONES,UA: NEGATIVE
SL AMB POCT NITRITE,UA: POSITIVE
SL AMB POCT PH,UA: 5
SL AMB POCT SPECIFIC GRAVITY,UA: 1.01
SL AMB POCT URINE PROTEIN: NEGATIVE
SL AMB POCT UROBILINOGEN: 0.2

## 2018-04-23 PROCEDURE — 36415 COLL VENOUS BLD VENIPUNCTURE: CPT | Performed by: FAMILY MEDICINE

## 2018-04-23 PROCEDURE — 87086 URINE CULTURE/COLONY COUNT: CPT | Performed by: FAMILY MEDICINE

## 2018-04-23 PROCEDURE — 82570 ASSAY OF URINE CREATININE: CPT | Performed by: FAMILY MEDICINE

## 2018-04-23 PROCEDURE — 82043 UR ALBUMIN QUANTITATIVE: CPT | Performed by: FAMILY MEDICINE

## 2018-04-23 PROCEDURE — 87186 SC STD MICRODIL/AGAR DIL: CPT | Performed by: FAMILY MEDICINE

## 2018-04-23 PROCEDURE — 81002 URINALYSIS NONAUTO W/O SCOPE: CPT | Performed by: FAMILY MEDICINE

## 2018-04-23 PROCEDURE — 87077 CULTURE AEROBIC IDENTIFY: CPT | Performed by: FAMILY MEDICINE

## 2018-04-23 PROCEDURE — 99213 OFFICE O/P EST LOW 20 MIN: CPT | Performed by: FAMILY MEDICINE

## 2018-04-23 RX ORDER — OXYCODONE HYDROCHLORIDE 20 MG/1
20 TABLET ORAL EVERY 6 HOURS PRN
Qty: 120 TABLET | Refills: 0 | Status: SHIPPED | OUTPATIENT
Start: 2018-04-23 | End: 2018-05-25 | Stop reason: SDUPTHER

## 2018-04-23 NOTE — PROGRESS NOTES
Assessment/Plan:    Blood work was to be done on site today however phlebotomist was unable to draw patient's blood, and she cancelled all orders  Will try to set patient up for home draw   Reviewed narcotic contract with patient  Will start to wean down patient on Oxycodone      Problem List Items Addressed This Visit        Nervous and Auditory    Paraplegic spinal paralysis (HCC)    Relevant Medications    oxyCODONE (ROXICODONE) 20 MG TABS       Other    Anemia    Relevant Orders    Microalbumin / creatinine urine ratio    Comprehensive metabolic panel    CBC and differential    TSH, 3rd generation with T4 reflex    History of diabetes mellitus    Relevant Orders    Microalbumin / creatinine urine ratio    HEMOGLOBIN A1C W/ EAG ESTIMATION    Anxiety    Relevant Orders    Microalbumin / creatinine urine ratio    TSH, 3rd generation with T4 reflex      Other Visit Diagnoses     Colon cancer screening        Relevant Orders    Ambulatory Referral to GI Endoscopy    Dysuria        Relevant Orders    POCT urine dip (Completed)    Microalbumin / creatinine urine ratio    Urine culture            Subjective:      Patient ID: Fidel Campo is a 63 y o  male  81-year-old quadriplegic male status post right hip disarticulation status post multiple plastic surgeries to help with closure of reoccurring wounds  He appears to be healing well  His main concern today is refill of his narcotic medication  He ran out of pain medications over 2 weeks ago, received a partial fill from wound care  LBP increased while off medication   Concerned with foul odor to his urine   Denies any new symptoms            The following portions of the patient's history were reviewed and updated as appropriate:   He  has a past medical history of Anemia; Blind; Colostomy in place [facility]); Diabetes mellitus ([facility] ); GERD (gastroesophageal reflux disease); History of diabetes mellitus;  History of osteomyelitis; leg amputation ([facility] ); Hyperlipidemia; Neurogenic bladder; Paralysis ([facility] ); Spinal cord cysts; and Ulcer of sacral region [facility])  He   Patient Active Problem List    Diagnosis Date Noted    Anxiety 04/23/2018    GERD (gastroesophageal reflux disease)     History of osteomyelitis     History of diabetes mellitus     Cyst of joint of shoulder 10/20/2016    Anemia 10/20/2016    Paraplegic spinal paralysis ([facility] ) 10/20/2016    Sinus tachycardia 10/20/2016    E  coli UTI (urinary tract infection) 10/18/2016    Stage IV pressure ulcer of sacral region ([facility] ) 10/15/2016    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, stage 3 ([facility] ) 10/15/2016    Stage IV pressure ulcer of left heel ([facility] ) 10/15/2016     He  has a past surgical history that includes Spine surgery; Leg amputation; Bladder surgery; Colostomy; Amputation; pr adj tiss xfer scalp,extrem 10 1-30 sqcm (Left, 5/1/2017); pr muscle-skin flap,trunk (Left, 5/1/2017); Colon surgery; and pr muscle-skin flap,trunk (Left, 9/27/2017)  His family history is not on file  He  reports that he has quit smoking  He has never used smokeless tobacco  He reports that he does not drink alcohol or use drugs  Current Outpatient Prescriptions   Medication Sig Dispense Refill    aspirin 81 MG tablet Take 81 mg by mouth daily      collagenase (SANTYL) ointment Apply 1 application topically daily      ibuprofen (MOTRIN) 800 mg tablet TAKE ONE TABLET BY MOUTH EVERY DAY AS NEEDED 60 tablet 0    oxyCODONE (ROXICODONE) 20 MG TABS Take 1 tablet (20 mg total) by mouth every 6 (six) hours as needed for moderate pain Max Daily Amount: 80 mg 120 tablet 0    oxyCODONE (ROXICODONE) 5 mg immediate release tablet Take 1 tablet by mouth every 4 (four) hours as needed for moderate pain for up to 15 doses Earliest Fill Date: 10/4/17 Max Daily Amount: 30 mg 15 tablet 0     No current facility-administered medications for this visit        Current Outpatient Prescriptions on File Prior to Visit   Medication Sig  aspirin 81 MG tablet Take 81 mg by mouth daily    collagenase (SANTYL) ointment Apply 1 application topically daily    ibuprofen (MOTRIN) 800 mg tablet TAKE ONE TABLET BY MOUTH EVERY DAY AS NEEDED    oxyCODONE (ROXICODONE) 5 mg immediate release tablet Take 1 tablet by mouth every 4 (four) hours as needed for moderate pain for up to 15 doses Earliest Fill Date: 10/4/17 Max Daily Amount: 30 mg    [DISCONTINUED] oxyCODONE (ROXICODONE) 20 MG TABS Take 1 tablet (20 mg total) by mouth every 6 (six) hours as needed for moderate pain Earliest Fill Date: 3/8/18 Max Daily Amount: 80 mg     No current facility-administered medications on file prior to visit       Review of Systems   Constitutional: Negative  HENT: Negative  Eyes: Negative  Respiratory: Negative  Cardiovascular: Negative  Gastrointestinal: Negative  Genitourinary: Negative  Musculoskeletal: Positive for arthralgias, back pain and joint swelling  Skin: Positive for wound  Psychiatric/Behavioral: Negative  All other systems reviewed and are negative  Objective:      /68 (BP Location: Left arm, Patient Position: Sitting, Cuff Size: Standard)   Pulse 99   Temp 98 9 °F (37 2 °C) (Tympanic)   Resp 16   SpO2 99%          Physical Exam   Constitutional: He is oriented to person, place, and time  He appears well-developed  HENT:   Head: Normocephalic  Right Ear: External ear normal    Left Ear: External ear normal    Nose: Nose normal    Mouth/Throat: Oropharynx is clear and moist    Eyes: Conjunctivae and EOM are normal  Pupils are equal, round, and reactive to light  Neck: Normal range of motion  Neck supple  No thyromegaly present  Cardiovascular: Normal rate, regular rhythm and normal heart sounds  Pulmonary/Chest: Effort normal and breath sounds normal    Abdominal: Soft  There is no tenderness  There is no rebound and no guarding     Neurological: He is alert and oriented to person, place, and time  He has normal reflexes  Psychiatric: He has a normal mood and affect  Nursing note and vitals reviewed

## 2018-04-25 DIAGNOSIS — N39.0 URINARY TRACT INFECTION ASSOCIATED WITH CATHETERIZATION OF URINARY TRACT, UNSPECIFIED INDWELLING URINARY CATHETER TYPE, INITIAL ENCOUNTER (HCC): Primary | ICD-10-CM

## 2018-04-25 DIAGNOSIS — T83.511A URINARY TRACT INFECTION ASSOCIATED WITH CATHETERIZATION OF URINARY TRACT, UNSPECIFIED INDWELLING URINARY CATHETER TYPE, INITIAL ENCOUNTER (HCC): Primary | ICD-10-CM

## 2018-04-25 LAB — BACTERIA UR CULT: ABNORMAL

## 2018-04-25 RX ORDER — CIPROFLOXACIN 250 MG/1
250 TABLET, FILM COATED ORAL EVERY 12 HOURS SCHEDULED
Qty: 14 TABLET | Refills: 0 | Status: SHIPPED | OUTPATIENT
Start: 2018-04-25 | End: 2018-05-02

## 2018-04-26 ENCOUNTER — APPOINTMENT (OUTPATIENT)
Dept: WOUND CARE | Facility: HOSPITAL | Age: 58
End: 2018-04-26
Payer: MEDICARE

## 2018-04-26 PROCEDURE — 11042 DBRDMT SUBQ TIS 1ST 20SQCM/<: CPT | Performed by: PREVENTIVE MEDICINE

## 2018-04-26 PROCEDURE — 11045 DBRDMT SUBQ TISS EACH ADDL: CPT | Performed by: PREVENTIVE MEDICINE

## 2018-05-02 ENCOUNTER — APPOINTMENT (OUTPATIENT)
Dept: WOUND CARE | Facility: HOSPITAL | Age: 58
End: 2018-05-02
Payer: MEDICARE

## 2018-05-02 PROCEDURE — 11042 DBRDMT SUBQ TIS 1ST 20SQCM/<: CPT | Performed by: FAMILY MEDICINE

## 2018-05-04 ENCOUNTER — TELEPHONE (OUTPATIENT)
Dept: FAMILY MEDICINE CLINIC | Facility: CLINIC | Age: 58
End: 2018-05-04

## 2018-05-07 LAB — HBA1C MFR BLD HPLC: 5.7 %

## 2018-05-09 ENCOUNTER — APPOINTMENT (OUTPATIENT)
Dept: WOUND CARE | Facility: HOSPITAL | Age: 58
End: 2018-05-09
Payer: MEDICARE

## 2018-05-09 ENCOUNTER — TRANSCRIBE ORDERS (OUTPATIENT)
Dept: WOUND CARE | Facility: HOSPITAL | Age: 58
End: 2018-05-09

## 2018-05-09 DIAGNOSIS — L89.324 DECUBITUS ULCER OF LEFT ISCHIAL AREA, STAGE IV (HCC): Primary | ICD-10-CM

## 2018-05-09 PROCEDURE — 11045 DBRDMT SUBQ TISS EACH ADDL: CPT | Performed by: FAMILY MEDICINE

## 2018-05-09 PROCEDURE — 87205 SMEAR GRAM STAIN: CPT | Performed by: FAMILY MEDICINE

## 2018-05-09 PROCEDURE — 87147 CULTURE TYPE IMMUNOLOGIC: CPT | Performed by: FAMILY MEDICINE

## 2018-05-09 PROCEDURE — 87070 CULTURE OTHR SPECIMN AEROBIC: CPT | Performed by: FAMILY MEDICINE

## 2018-05-09 PROCEDURE — 11042 DBRDMT SUBQ TIS 1ST 20SQCM/<: CPT | Performed by: FAMILY MEDICINE

## 2018-05-09 PROCEDURE — 87186 SC STD MICRODIL/AGAR DIL: CPT | Performed by: FAMILY MEDICINE

## 2018-05-12 LAB
BACTERIA WND AEROBE CULT: ABNORMAL
GRAM STN SPEC: ABNORMAL

## 2018-05-14 ENCOUNTER — APPOINTMENT (OUTPATIENT)
Dept: WOUND CARE | Facility: HOSPITAL | Age: 58
End: 2018-05-14
Payer: MEDICARE

## 2018-05-14 DIAGNOSIS — L89.154 STAGE IV PRESSURE ULCER OF SACRAL REGION (HCC): Chronic | ICD-10-CM

## 2018-05-14 DIAGNOSIS — G82.20 PARAPLEGIC SPINAL PARALYSIS (HCC): ICD-10-CM

## 2018-05-14 DIAGNOSIS — L89.323: Chronic | ICD-10-CM

## 2018-05-14 DIAGNOSIS — L89.624 STAGE IV PRESSURE ULCER OF LEFT HEEL (HCC): Chronic | ICD-10-CM

## 2018-05-14 DIAGNOSIS — L89.320: Primary | ICD-10-CM

## 2018-05-14 PROCEDURE — 99212 OFFICE O/P EST SF 10 MIN: CPT

## 2018-05-15 NOTE — PROGRESS NOTES
H&P Exam - Wound Care   Reno Arguello 63 y o  male MRN: [id number]  Unit/Bed#:  Encounter: [id number]    Assessment/Plan     Assessment:  ***  Plan:  ***    History of Present Illness   HPI:  Fidel Campo is a 63 y o  male who presents with a wound located at left ischium  Patient has history of quadriplegia with prolonged history of left ischium ulcer recurrences despite multiple surgery interventions  Patient had a right hip disarticulation and left ischium ulcer reconstruction with gluteal rotational flap performed at \[facility]\"" by Dr Perry Reyes, which unfortunately had a recurrence as well  Patient was evaluated by Plastic surgery at [facility] with Dr Wade Henning, local wound care was provided until surgery was attempted with  left V-Y posterior thigh flap and re-advancement of his previous gluteal rotational flap as well on 5/01/2017  Patient did well initially, discharge home with bedrest at his own [place] bed  Patient then developed superficial dehiscence and re-advancement of previous flap were performed on 9/27/2017  He went to [facility] for seat mapping and is been followed at the wound center for local wound care  Patient over the last several months lost transportation and has missed several wound care appointments, PCP and also not seen at plastic surgery office  During last several visits at Wound center he manifest that was putting a lot of pressure while transferring due to old mattress and local wound care was started with [facility]  Today, we are asked to evaluate and provide recommendation given that progress has plateaus       Review of Systems   Constitutional: Negative  HENT: Negative  Eyes: Negative  Genitourinary: Positive for difficulty urinating  Skin: Positive for wound         Historical Information   Past Medical History:   Diagnosis Date    Anemia     Blind     r eye    [facility] )     Diabetes mellitus ([facility] )     Poorly controlled type 2; Last Assessed:  3/18/14    GERD (gastroesophageal reflux disease)     History of diabetes mellitus     History of osteomyelitis     Hx of leg amputation (HCC)     r high upper leg    Hyperlipidemia     Neurogenic bladder     Paralysis (HCC)     Spinal cord cysts     Ulcer of sacral region [facility])      Past Surgical History:   Procedure Laterality Date    AMPUTATION      At hip; Last Assessed:  1/19/16    BLADDER SURGERY      COLON SURGERY      llq ostomy pouch    COLOSTOMY      LEG AMPUTATION      WA ADJ TISS XFER SCALP,EXTREM 10 1-30 SQCM Left 5/1/2017    Procedure: POSTERIOR THIGH V-Y ADMANCEMENT;  Surgeon: Son Bustillo MD;  Location: [facility];  Service: Plastics    WA MUSCLE-SKIN FLAP,TRUNK Left 5/1/2017    Procedure: FLAP CLOSURE LEFT ISCHIAL WOUND and "RIGHT" ISCHIAL FLAP ADVANCEMENT * DEBRIDEMENT, [place] ;  Surgeon: Son Bustillo MD;  Location[facility];  Service: Plastics    WA MUSCLE-SKIN FLAP,TRUNK Left 9/27/2017    Procedure: gluteal myocutaneous rotational flap, posterior thigh v to y advancement- wound 5 x 2 5 x 8;  Surgeon: Son uBstillo MD;  Location: [facility];  Service: Plastics    SPINE SURGERY      Lower back     Social History   History   Alcohol Use No     Comment: Per [employer]:  Social drinker (Onset date:  11/10/17)     History   Drug Use No     History   Smoking Status    Former Smoker   Smokeless Tobacco    Never Used     Comment: Onset date:  11/10/17     Family History: {ALEX PEOPLES FAM HISTORY SMARTLIST:479087136}    Meds/Allergies   { RICHELLE SSPX:[id number]}  No Known Allergies    Objective   Vitals: There were no vitals taken for this visit  Wounds:     Pressure Ulcer 10/15/16 Sacrum Mid packing in place; no reddness noted around wound; no puruent drainage noted;  Pt states visiting nurses manage wound (Active)       Pressure Ulcer 10/16/16 Sacrum Proximal Beefy red Stage 3  (Active)       Incision 05/01/17 Buttocks Left (Active)       Other Ulcers 09/27/17 Knee Left dsd in place (Active)           Physical Exam    Lab Results: {Results Review Statement:92671}  Imaging: {Results Review Statement:73213}  EKG, Pathology, and Other Studies: {Results Review Statement:44906}    Code Status: [unfilled]  Advance Directive and Living Will:      Power of :    POLST:      Counseling / Coordination of Care  Total floor / unit time spent today *** minutes  Greater than 50% of total time was spent with the patient and / or family counseling and / or coordination of care  A description of the counseling / coordination of care: ***

## 2018-05-18 ENCOUNTER — TELEPHONE (OUTPATIENT)
Dept: FAMILY MEDICINE CLINIC | Facility: CLINIC | Age: 58
End: 2018-05-18

## 2018-05-18 DIAGNOSIS — N31.9 NEUROGENIC DYSFUNCTION OF THE URINARY BLADDER: Primary | ICD-10-CM

## 2018-05-18 DIAGNOSIS — G82.20 PARAPLEGIA (HCC): ICD-10-CM

## 2018-05-18 DIAGNOSIS — L89.324 STAGE IV PRESSURE ULCER OF LEFT BUTTOCK (HCC): ICD-10-CM

## 2018-05-18 DIAGNOSIS — E11.9 TYPE 2 DIABETES MELLITUS WITHOUT COMPLICATION, WITHOUT LONG-TERM CURRENT USE OF INSULIN (HCC): ICD-10-CM

## 2018-05-18 RX ORDER — NUT.TX.IMPAIRED DIGESTIVE FXN 0.035-1/ML
1 LIQUID (ML) ORAL 3 TIMES DAILY
Qty: 237 ML | Refills: 11 | Status: SHIPPED | OUTPATIENT
Start: 2018-05-18 | End: 2019-09-25 | Stop reason: HOSPADM

## 2018-05-18 RX ORDER — MEDICAL SUPPLY, MISCELLANEOUS
EACH MISCELLANEOUS
Qty: 2 EACH | Refills: 11 | Status: SHIPPED | OUTPATIENT
Start: 2018-05-18 | End: 2019-05-22 | Stop reason: SDUPTHER

## 2018-05-23 ENCOUNTER — TELEPHONE (OUTPATIENT)
Dept: PSYCHIATRY | Facility: CLINIC | Age: 58
End: 2018-05-23

## 2018-05-24 DIAGNOSIS — K21.9 GASTROESOPHAGEAL REFLUX DISEASE WITHOUT ESOPHAGITIS: Primary | ICD-10-CM

## 2018-05-24 DIAGNOSIS — M54.50 CHRONIC LOW BACK PAIN WITHOUT SCIATICA, UNSPECIFIED BACK PAIN LATERALITY: ICD-10-CM

## 2018-05-24 DIAGNOSIS — G89.29 CHRONIC LOW BACK PAIN WITHOUT SCIATICA, UNSPECIFIED BACK PAIN LATERALITY: ICD-10-CM

## 2018-05-24 RX ORDER — OMEPRAZOLE 20 MG/1
CAPSULE, DELAYED RELEASE ORAL
Qty: 90 CAPSULE | Refills: 0 | Status: SHIPPED | OUTPATIENT
Start: 2018-05-24 | End: 2018-08-10 | Stop reason: SDUPTHER

## 2018-05-24 RX ORDER — IBUPROFEN 800 MG/1
TABLET ORAL
Qty: 60 TABLET | Refills: 0 | Status: SHIPPED | OUTPATIENT
Start: 2018-05-24 | End: 2018-08-10 | Stop reason: SDUPTHER

## 2018-05-25 DIAGNOSIS — G82.20 PARAPLEGIC SPINAL PARALYSIS (HCC): ICD-10-CM

## 2018-05-25 RX ORDER — OXYCODONE HYDROCHLORIDE 20 MG/1
20 TABLET ORAL EVERY 6 HOURS PRN
Qty: 120 TABLET | Refills: 0 | Status: SHIPPED | OUTPATIENT
Start: 2018-05-25 | End: 2018-07-03 | Stop reason: SDUPTHER

## 2018-05-31 ENCOUNTER — APPOINTMENT (OUTPATIENT)
Dept: WOUND CARE | Facility: HOSPITAL | Age: 58
End: 2018-05-31
Payer: MEDICARE

## 2018-05-31 PROCEDURE — 11042 DBRDMT SUBQ TIS 1ST 20SQCM/<: CPT | Performed by: FAMILY MEDICINE

## 2018-05-31 PROCEDURE — 11045 DBRDMT SUBQ TISS EACH ADDL: CPT | Performed by: FAMILY MEDICINE

## 2018-06-08 ENCOUNTER — APPOINTMENT (OUTPATIENT)
Dept: WOUND CARE | Facility: HOSPITAL | Age: 58
End: 2018-06-08
Payer: MEDICARE

## 2018-06-08 PROCEDURE — 99214 OFFICE O/P EST MOD 30 MIN: CPT | Performed by: FAMILY MEDICINE

## 2018-06-08 PROCEDURE — 11042 DBRDMT SUBQ TIS 1ST 20SQCM/<: CPT | Performed by: FAMILY MEDICINE

## 2018-06-11 ENCOUNTER — TELEPHONE (OUTPATIENT)
Dept: PLASTIC SURGERY | Facility: CLINIC | Age: 58
End: 2018-06-11

## 2018-06-11 DIAGNOSIS — F41.9 ANXIETY: Primary | ICD-10-CM

## 2018-06-11 RX ORDER — LORAZEPAM 1 MG/1
1 TABLET ORAL DAILY
Qty: 1 TABLET | Refills: 0 | Status: ON HOLD | OUTPATIENT
Start: 2018-06-11 | End: 2019-09-15 | Stop reason: ALTCHOICE

## 2018-06-11 NOTE — TELEPHONE ENCOUNTER
Nicol with [facility] calling to request sedation be ordered for MRI  Patient states he is claustrophobic so has not yet completed ordered MRI  Requests that prescription be sent to [address] [place] [address] [address]          Dr Ishaan Sanchez - please advise  Thanks!

## 2018-06-11 NOTE — TELEPHONE ENCOUNTER
Verbally discussed with Dr Delilah Jeans who recommends Ativan 1 mg 30 minutes prior to procedure  Script printed and available for  at [place] office  Reviewed this with Faye Askew, visiting nurse, who verbalized understanding  States she will address with patient and wife and help figure out a plan to  prescription

## 2018-06-14 ENCOUNTER — APPOINTMENT (OUTPATIENT)
Dept: WOUND CARE | Facility: HOSPITAL | Age: 58
End: 2018-06-14
Payer: MEDICARE

## 2018-06-14 PROCEDURE — 11042 DBRDMT SUBQ TIS 1ST 20SQCM/<: CPT | Performed by: FAMILY MEDICINE

## 2018-07-03 DIAGNOSIS — G82.20 PARAPLEGIC SPINAL PARALYSIS (HCC): ICD-10-CM

## 2018-07-06 RX ORDER — OXYCODONE HYDROCHLORIDE 20 MG/1
20 TABLET ORAL EVERY 8 HOURS PRN
Qty: 90 TABLET | Refills: 0 | Status: SHIPPED | OUTPATIENT
Start: 2018-07-06 | End: 2018-08-06 | Stop reason: SDUPTHER

## 2018-07-18 ENCOUNTER — OFFICE VISIT (OUTPATIENT)
Dept: FAMILY MEDICINE CLINIC | Facility: CLINIC | Age: 58
End: 2018-07-18
Payer: MEDICARE

## 2018-07-18 VITALS
DIASTOLIC BLOOD PRESSURE: 72 MMHG | TEMPERATURE: 97.6 F | RESPIRATION RATE: 18 BRPM | OXYGEN SATURATION: 100 % | SYSTOLIC BLOOD PRESSURE: 116 MMHG | HEART RATE: 68 BPM

## 2018-07-18 DIAGNOSIS — Z12.11 SCREENING FOR COLON CANCER: Primary | ICD-10-CM

## 2018-07-18 DIAGNOSIS — E11.9 TYPE 2 DIABETES MELLITUS WITHOUT COMPLICATION, WITHOUT LONG-TERM CURRENT USE OF INSULIN (HCC): ICD-10-CM

## 2018-07-18 DIAGNOSIS — G82.20 PARAPLEGIC SPINAL PARALYSIS (HCC): ICD-10-CM

## 2018-07-18 PROBLEM — S88.911A AMPUTATED RIGHT LEG (HCC): Status: ACTIVE | Noted: 2018-07-18

## 2018-07-18 PROCEDURE — 99213 OFFICE O/P EST LOW 20 MIN: CPT | Performed by: FAMILY MEDICINE

## 2018-07-18 NOTE — PROGRESS NOTES
Done  Sent to [address] [place] [address],  [address] Assessment/Plan:    Will continue to wean off/decrease narcotics over the next 6 months        Problem List Items Addressed This Visit        Endocrine    DM type 2 (diabetes mellitus, type 2) ([facility] )       Nervous and Auditory    Paraplegic spinal paralysis ([facility] )      Other Visit Diagnoses     Screening for colon cancer    -  Primary            Subjective:      Patient ID: Jabari Almonte is a 63 y o  male  64 yo  male with well controlled DM, HTN, paraplegic spinal paralysis, recurrent UTI, here today for follow up  States he feels he is doing well  States he has realized he is becoming dependent on his oxycodone and has started weaning himself off it  He is requesting a letter for Ángel Sue that states he should be allowed to travel both with or without a            The following portions of the patient's history were reviewed and updated as appropriate:   He  has a past medical history of Anemia; Blind; Colostomy in place [facility]); Diabetes mellitus ([facility] ); GERD (gastroesophageal reflux disease); History of diabetes mellitus; History of osteomyelitis; leg amputation ([facility] ); Hyperlipidemia; Neurogenic bladder; Paralysis ([facility] ); Spinal cord cysts; and Ulcer of sacral region [facility])    He   Patient Active Problem List    Diagnosis Date Noted    Amputated right leg ([facility] ) 07/18/2018    Anxiety 04/23/2018    GERD (gastroesophageal reflux disease)     History of osteomyelitis     History of diabetes mellitus     Cyst of joint of shoulder 10/20/2016    Anemia 10/20/2016    Paraplegic spinal paralysis ([facility] ) 10/20/2016    Sinus tachycardia 10/20/2016    E  coli UTI (urinary tract infection) 10/18/2016    Stage IV pressure ulcer of sacral region ([address] ) 10/15/2016    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, stage 3 ([address] ) 10/15/2016    Stage IV pressure ulcer of left heel ([address] ) 10/15/2016    DM type 2 (diabetes mellitus, type 2) ([address] ) 03/07/2014    Benign essential hypertension 07/31/2013     He  has a past surgical history that includes Spine surgery; Leg amputation; Bladder surgery; Colostomy; Amputation; pr adj tiss xfer scalp,extrem 10 1-30 sqcm (Left, 5/1/2017); pr muscle-skin flap,trunk (Left, 5/1/2017); Colon surgery; and pr muscle-skin flap,trunk (Left, 9/27/2017)  His family history is not on file  He  reports that he has quit smoking  He has never used smokeless tobacco  He reports that he does not drink alcohol or use drugs  Current Outpatient Prescriptions   Medication Sig Dispense Refill    aspirin 81 MG tablet Take 81 mg by mouth daily      collagenase (SANTYL) ointment Apply 1 application topically daily      Disposable Gloves (LATEX GLOVES LARGE) MISC Use as needed up to 3 times a day dispo 2 boxes 2 each 11    ibuprofen (MOTRIN) 800 mg tablet TAKE ONE TABLET BY MOUTH EVERY DAY AS NEEDED 60 tablet 0    Incontinence Supply Disposable (SUNBEAM INCONTINENT UNDER PAD) MISC Dispense 4 reusable under pads 4 each 11    Incontinence Supply Disposable MISC by Does not apply route 2 (two) times a day 60 each 11    LORazepam (ATIVAN) 1 mg tablet Take 1 tablet (1 mg total) by mouth daily 30 minutes prior to MRI 1 tablet 0    Nutritional Supplements (GLUCERNA 1 0 JOHANN/CARBSTEADY) LIQD Take 1 Can by mouth 3 (three) times a day Dispense 90 cans per month 237 mL 11    omeprazole (PriLOSEC) 20 mg delayed release capsule TAKE 1 CAPSULE BY MOUTH EVERY NIGHT AT BEDTIME 90 capsule 0    oxyCODONE (ROXICODONE) 20 MG TABS Take 1 tablet (20 mg total) by mouth every 8 (eight) hours as needed for moderate pain Max Daily Amount: 60 mg 90 tablet 0    oxyCODONE (ROXICODONE) 5 mg immediate release tablet Take 1 tablet by mouth every 4 (four) hours as needed for moderate pain for up to 15 doses Earliest Fill Date: 10/4/17 Max Daily Amount: 30 mg 15 tablet 0     No current facility-administered medications for this visit        Current Outpatient Prescriptions on File Prior to Visit   Medication Sig    aspirin 81 MG tablet Take 81 mg by mouth daily    collagenase (SANTYL) ointment Apply 1 application topically daily    Disposable Gloves (LATEX GLOVES LARGE) MISC Use as needed up to 3 times a day dispo 2 boxes    ibuprofen (MOTRIN) 800 mg tablet TAKE ONE TABLET BY MOUTH EVERY DAY AS NEEDED    Incontinence Supply Disposable (SUNBEAM INCONTINENT UNDER PAD) MISC Dispense 4 reusable under pads    Incontinence Supply Disposable MISC by Does not apply route 2 (two) times a day    LORazepam (ATIVAN) 1 mg tablet Take 1 tablet (1 mg total) by mouth daily 30 minutes prior to MRI    Nutritional Supplements (GLUCERNA 1 0 JOHANN/CARBSTEADY) LIQD Take 1 Can by mouth 3 (three) times a day Dispense 90 cans per month    omeprazole (PriLOSEC) 20 mg delayed release capsule TAKE 1 CAPSULE BY MOUTH EVERY NIGHT AT BEDTIME    oxyCODONE (ROXICODONE) 20 MG TABS Take 1 tablet (20 mg total) by mouth every 8 (eight) hours as needed for moderate pain Max Daily Amount: 60 mg    oxyCODONE (ROXICODONE) 5 mg immediate release tablet Take 1 tablet by mouth every 4 (four) hours as needed for moderate pain for up to 15 doses Earliest Fill Date: 10/4/17 Max Daily Amount: 30 mg     No current facility-administered medications on file prior to visit       Review of Systems   Musculoskeletal: Positive for back pain  All other systems reviewed and are negative  Objective:      /72 (BP Location: Left arm, Patient Position: Sitting, Cuff Size: Standard)   Pulse 68   Temp 97 6 °F (36 4 °C) (Tympanic)   Resp 18   SpO2 100%          Physical Exam   Constitutional: He is oriented to person, place, and time  He appears well-developed and well-nourished  HENT:   Head: Normocephalic  Right Ear: External ear normal    Left Ear: External ear normal    Nose: Nose normal    Mouth/Throat: Oropharynx is clear and moist    Eyes: Conjunctivae and EOM are normal  Pupils are equal, round, and reactive to light  Neck: Normal range of motion  Neck supple  No thyromegaly present  Cardiovascular: Normal rate, regular rhythm and normal heart sounds  Pulmonary/Chest: Effort normal and breath sounds normal    Abdominal: Soft  There is no tenderness  There is no rebound and no guarding  Neurological: He is alert and oriented to person, place, and time  He has normal reflexes  Skin: Skin is dry  Psychiatric: He has a normal mood and affect  Nursing note and vitals reviewed

## 2018-07-25 ENCOUNTER — APPOINTMENT (OUTPATIENT)
Dept: WOUND CARE | Facility: HOSPITAL | Age: 58
End: 2018-07-25
Payer: MEDICARE

## 2018-07-25 PROCEDURE — 11043 DBRDMT MUSC&/FSCA 1ST 20/<: CPT | Performed by: FAMILY MEDICINE

## 2018-07-25 PROCEDURE — 11042 DBRDMT SUBQ TIS 1ST 20SQCM/<: CPT | Performed by: FAMILY MEDICINE

## 2018-07-25 PROCEDURE — 99214 OFFICE O/P EST MOD 30 MIN: CPT | Performed by: FAMILY MEDICINE

## 2018-07-25 PROCEDURE — 97597 DBRDMT OPN WND 1ST 20 CM/<: CPT | Performed by: FAMILY MEDICINE

## 2018-07-25 PROCEDURE — 11046 DBRDMT MUSC&/FSCA EA ADDL: CPT | Performed by: FAMILY MEDICINE

## 2018-08-03 ENCOUNTER — TELEPHONE (OUTPATIENT)
Dept: FAMILY MEDICINE CLINIC | Facility: CLINIC | Age: 58
End: 2018-08-03

## 2018-08-03 NOTE — TELEPHONE ENCOUNTER
Pt states he left a message on the med line on Monday for a refill on oxy    I don't see a task for it        Requesting refill for oxy 20mg once daily     Pharmacy correct on file     [facility] [address]

## 2018-08-06 DIAGNOSIS — G82.20 PARAPLEGIC SPINAL PARALYSIS (HCC): ICD-10-CM

## 2018-08-07 RX ORDER — OXYCODONE HYDROCHLORIDE 20 MG/1
20 TABLET ORAL EVERY 8 HOURS PRN
Qty: 90 TABLET | Refills: 0 | Status: SHIPPED | OUTPATIENT
Start: 2018-08-07 | End: 2018-09-05 | Stop reason: SDUPTHER

## 2018-08-10 DIAGNOSIS — G89.29 CHRONIC LOW BACK PAIN WITHOUT SCIATICA, UNSPECIFIED BACK PAIN LATERALITY: ICD-10-CM

## 2018-08-10 DIAGNOSIS — K21.9 GASTROESOPHAGEAL REFLUX DISEASE WITHOUT ESOPHAGITIS: ICD-10-CM

## 2018-08-10 DIAGNOSIS — M54.50 CHRONIC LOW BACK PAIN WITHOUT SCIATICA, UNSPECIFIED BACK PAIN LATERALITY: ICD-10-CM

## 2018-08-13 RX ORDER — IBUPROFEN 800 MG/1
TABLET ORAL
Qty: 60 TABLET | Refills: 0 | Status: SHIPPED | OUTPATIENT
Start: 2018-08-13 | End: 2018-11-13 | Stop reason: SDUPTHER

## 2018-08-13 RX ORDER — OMEPRAZOLE 20 MG/1
CAPSULE, DELAYED RELEASE ORAL
Qty: 90 CAPSULE | Refills: 0 | Status: SHIPPED | OUTPATIENT
Start: 2018-08-13 | End: 2019-01-15 | Stop reason: SDUPTHER

## 2018-08-23 ENCOUNTER — APPOINTMENT (OUTPATIENT)
Dept: WOUND CARE | Facility: HOSPITAL | Age: 58
End: 2018-08-23
Payer: MEDICARE

## 2018-08-23 PROCEDURE — 11042 DBRDMT SUBQ TIS 1ST 20SQCM/<: CPT | Performed by: FAMILY MEDICINE

## 2018-08-23 PROCEDURE — 11045 DBRDMT SUBQ TISS EACH ADDL: CPT | Performed by: FAMILY MEDICINE

## 2018-09-04 DIAGNOSIS — E11.9 TYPE 2 DIABETES MELLITUS WITHOUT COMPLICATION, WITHOUT LONG-TERM CURRENT USE OF INSULIN (HCC): Primary | ICD-10-CM

## 2018-09-04 RX ORDER — BLOOD SUGAR DIAGNOSTIC
STRIP MISCELLANEOUS
Qty: 100 EACH | Refills: 1 | Status: SHIPPED | OUTPATIENT
Start: 2018-09-04 | End: 2019-01-18 | Stop reason: SDUPTHER

## 2018-09-04 RX ORDER — LANCETS
EACH MISCELLANEOUS
Qty: 102 EACH | Refills: 0 | Status: SHIPPED | OUTPATIENT
Start: 2018-09-04 | End: 2018-11-13 | Stop reason: SDUPTHER

## 2018-09-05 DIAGNOSIS — G82.20 PARAPLEGIC SPINAL PARALYSIS (HCC): ICD-10-CM

## 2018-09-06 RX ORDER — OXYCODONE HYDROCHLORIDE 20 MG/1
20 TABLET ORAL EVERY 8 HOURS PRN
Qty: 90 TABLET | Refills: 0 | Status: SHIPPED | OUTPATIENT
Start: 2018-09-06 | End: 2018-10-09 | Stop reason: SDUPTHER

## 2018-09-18 ENCOUNTER — TELEPHONE (OUTPATIENT)
Dept: FAMILY MEDICINE CLINIC | Facility: CLINIC | Age: 58
End: 2018-09-18

## 2018-09-18 NOTE — TELEPHONE ENCOUNTER
Pt stated he has diarrhea and feels his intestines are swollen/inflamed   (no fever, no other symptoms)

## 2018-09-18 NOTE — TELEPHONE ENCOUNTER
What are his symptoms  Why does he not feel well  What is he feeling  I need more information in order to make a decision

## 2018-09-20 ENCOUNTER — APPOINTMENT (OUTPATIENT)
Dept: WOUND CARE | Facility: HOSPITAL | Age: 58
End: 2018-09-20
Payer: MEDICARE

## 2018-09-20 PROCEDURE — 11042 DBRDMT SUBQ TIS 1ST 20SQCM/<: CPT | Performed by: FAMILY MEDICINE

## 2018-09-20 PROCEDURE — 99214 OFFICE O/P EST MOD 30 MIN: CPT | Performed by: FAMILY MEDICINE

## 2018-09-20 NOTE — TELEPHONE ENCOUNTER
Pt  Calling tomorrow for same day for symptoms of diarrhea & stomach swollen with GAVIOTA Krishnamurthy- Yadi Giles -same day is taken)

## 2018-09-21 ENCOUNTER — TRANSCRIBE ORDERS (OUTPATIENT)
Dept: LAB | Facility: CLINIC | Age: 58
End: 2018-09-21

## 2018-09-21 ENCOUNTER — OFFICE VISIT (OUTPATIENT)
Dept: FAMILY MEDICINE CLINIC | Facility: CLINIC | Age: 58
End: 2018-09-21
Payer: MEDICARE

## 2018-09-21 VITALS
DIASTOLIC BLOOD PRESSURE: 70 MMHG | HEART RATE: 75 BPM | OXYGEN SATURATION: 97 % | SYSTOLIC BLOOD PRESSURE: 108 MMHG | RESPIRATION RATE: 18 BRPM | TEMPERATURE: 97.7 F

## 2018-09-21 DIAGNOSIS — I10 BENIGN ESSENTIAL HYPERTENSION: Primary | ICD-10-CM

## 2018-09-21 DIAGNOSIS — Z86.39 HISTORY OF DIABETES MELLITUS: ICD-10-CM

## 2018-09-21 DIAGNOSIS — R19.7 DIARRHEA, UNSPECIFIED TYPE: ICD-10-CM

## 2018-09-21 PROCEDURE — 99213 OFFICE O/P EST LOW 20 MIN: CPT | Performed by: FAMILY MEDICINE

## 2018-09-21 NOTE — ASSESSMENT & PLAN NOTE
Since it is improving on its own and seems to be self limited, will hold off on stool studies  BRAT diet  Increase fluids   If not resolved within 7 days will send out for culture, C diff, WBCs

## 2018-09-21 NOTE — PROGRESS NOTES
Assessment/Plan:    Diarrhea  Since it is improving on its own and seems to be self limited, will hold off on stool studies  BRAT diet  Increase fluids   If not resolved within 7 days will send out for culture, C diff, WBCs     Unable to have BW done today as phlebotomist was not able to find a vein  He will return next week for BW      Problem List Items Addressed This Visit        Cardiovascular and Mediastinum    Benign essential hypertension - Primary    Relevant Orders    Lipid panel       Other    History of diabetes mellitus    Relevant Orders    Lipid panel    Diarrhea     Since it is improving on its own and seems to be self limited, will hold off on stool studies  BRAT diet  Increase fluids   If not resolved within 7 days will send out for culture, C diff, WBCs                  Subjective:      Patient ID: Jabari Almonte is a 63 y o  male  62 yo  male paraplegic with history of C diff infection multiple times over the last 2 years complains of 3 day history of watery diarrhea  Patient states he has been having 6 to8 watery BMs a day for 3 days  However this am had a solid BM  Denies fever, chills, bloating, abdominal pain  The following portions of the patient's history were reviewed and updated as appropriate:   He  has a past medical history of Anemia; Blind; Colostomy in place [facility]); Diabetes mellitus ([facility] ); GERD (gastroesophageal reflux disease); History of diabetes mellitus; History of osteomyelitis; leg amputation ([facility] ); Hyperlipidemia; Neurogenic bladder; Paralysis ([facility] ); Spinal cord cysts; and Ulcer of sacral region [facility])    He   Patient Active Problem List    Diagnosis Date Noted    Diarrhea 09/21/2018    Amputated right leg ([facility] ) 07/18/2018    Anxiety 04/23/2018    GERD (gastroesophageal reflux disease)     History of osteomyelitis     History of diabetes mellitus     Cyst of joint of shoulder 10/20/2016    Anemia 10/20/2016    Paraplegic spinal paralysis ([facility] ) 10/20/2016    Sinus tachycardia 10/20/2016    E  coli UTI (urinary tract infection) 10/18/2016    Stage IV pressure ulcer of sacral region ([facility] ) 10/15/2016    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, stage 3 ([facility] ) 10/15/2016    Stage IV [facility] of left heel ([facility] ) 10/15/2016    DM type 2 (diabetes mellitus, type 2) ([place] [address] ) 03/07/2014    Benign essential hypertension 07/31/2013     He  has a past surgical history that includes Spine surgery; Leg amputation; Bladder surgery; Colostomy; Amputation; pr adj tiss xfer scalp,extrem 10 1-30 sqcm (Left, 5/1/2017); pr muscle-skin flap,trunk (Left, 5/1/2017); Colon surgery; and pr muscle-skin flap,trunk (Left, 9/27/2017)  His family history is not on file  He  reports that he has quit smoking  He has never used smokeless tobacco  He reports that he does not drink alcohol or use drugs    Current Outpatient Prescriptions   Medication Sig Dispense Refill    ACCU-CHEK FASTCLIX LANCETS MISC TEST TWICE DAILY AS DIRECTED 102 each 0    ACCU-CHEK SMARTVIEW test strip TEST TWICE DAILY 100 each 1    aspirin 81 MG tablet Take 81 mg by mouth daily      collagenase (SANTYL) ointment Apply 1 application topically daily      Disposable Gloves (LATEX GLOVES LARGE) MISC Use as needed up to 3 times a day dispo 2 boxes 2 each 11    ibuprofen (MOTRIN) 800 mg tablet TAKE ONE TABLET BY MOUTH EVERY DAY AS NEEDED 60 tablet 0    Incontinence Supply Disposable (SUNBEAM INCONTINENT UNDER PAD) MISC Dispense 4 reusable under pads 4 each 11    Incontinence Supply Disposable MISC by Does not apply route 2 (two) times a day 60 each 11    LORazepam (ATIVAN) 1 mg tablet Take 1 tablet (1 mg total) by mouth daily 30 minutes prior to MRI 1 tablet 0    Nutritional Supplements (GLUCERNA 1 0 JOHANN/CARBSTEADY) LIQD Take 1 Can by mouth 3 (three) times a day Dispense 90 cans per month 237 mL 11    omeprazole (PriLOSEC) 20 mg delayed release capsule TAKE 1 CAPSULE BY MOUTH EVERY NIGHT AT BEDTIME 90 capsule 0    oxyCODONE (ROXICODONE) 20 MG TABS Take 1 tablet (20 mg total) by mouth every 8 (eight) hours as needed for moderate pain Max Daily Amount: 60 mg 90 tablet 0    oxyCODONE (ROXICODONE) 5 mg immediate release tablet Take 1 tablet by mouth every 4 (four) hours as needed for moderate pain for up to 15 doses Earliest Fill Date: 10/4/17 Max Daily Amount: 30 mg 15 tablet 0     No current facility-administered medications for this visit  Current Outpatient Prescriptions on File Prior to Visit   Medication Sig    ACCU-CHEK FASTCLIX LANCETS MISC TEST TWICE DAILY AS DIRECTED    ACCU-CHEK SMARTVIEW test strip TEST TWICE DAILY    aspirin 81 MG tablet Take 81 mg by mouth daily    collagenase (SANTYL) ointment Apply 1 application topically daily    Disposable Gloves (LATEX GLOVES LARGE) MISC Use as needed up to 3 times a day dispo 2 boxes    ibuprofen (MOTRIN) 800 mg tablet TAKE ONE TABLET BY MOUTH EVERY DAY AS NEEDED    Incontinence Supply Disposable (SUNBEAM INCONTINENT UNDER PAD) MISC Dispense 4 reusable under pads    Incontinence Supply Disposable MISC by Does not apply route 2 (two) times a day    LORazepam (ATIVAN) 1 mg tablet Take 1 tablet (1 mg total) by mouth daily 30 minutes prior to MRI    Nutritional Supplements (GLUCERNA 1 0 JOHANN/CARBSTEADY) LIQD Take 1 Can by mouth 3 (three) times a day Dispense 90 cans per month    omeprazole (PriLOSEC) 20 mg delayed release capsule TAKE 1 CAPSULE BY MOUTH EVERY NIGHT AT BEDTIME    oxyCODONE (ROXICODONE) 20 MG TABS Take 1 tablet (20 mg total) by mouth every 8 (eight) hours as needed for moderate pain Max Daily Amount: 60 mg    oxyCODONE (ROXICODONE) 5 mg immediate release tablet Take 1 tablet by mouth every 4 (four) hours as needed for moderate pain for up to 15 doses Earliest Fill Date: 10/4/17 Max Daily Amount: 30 mg     No current facility-administered medications on file prior to visit           Review of Systems   Constitutional: Negative for fatigue and fever  Gastrointestinal: Positive for diarrhea  Negative for nausea and vomiting  All other systems reviewed and are negative  Objective:      /70 (BP Location: Left arm, Patient Position: Sitting, Cuff Size: Standard)   Pulse 75   Temp 97 7 °F (36 5 °C) (Tympanic)   Resp 18   SpO2 97%          Physical Exam   Constitutional: He is oriented to person, place, and time  He appears well-developed  HENT:   Head: Normocephalic  Right Ear: External ear normal    Left Ear: External ear normal    Nose: Nose normal    Mouth/Throat: Oropharynx is clear and moist    Eyes: Conjunctivae and EOM are normal  Pupils are equal, round, and reactive to light  Neck: Normal range of motion  Neck supple  No thyromegaly present  Cardiovascular: Normal rate, regular rhythm and normal heart sounds  Pulmonary/Chest: Effort normal and breath sounds normal    Abdominal: Soft  There is no tenderness  There is no rebound and no guarding  Musculoskeletal: Normal range of motion  Neurological: He is alert and oriented to person, place, and time  He has normal reflexes  Skin: Skin is dry  Psychiatric: He has a normal mood and affect  Nursing note and vitals reviewed

## 2018-09-25 ENCOUNTER — TRANSCRIBE ORDERS (OUTPATIENT)
Dept: ADMINISTRATIVE | Facility: HOSPITAL | Age: 58
End: 2018-09-25

## 2018-09-25 ENCOUNTER — APPOINTMENT (OUTPATIENT)
Dept: LAB | Facility: HOSPITAL | Age: 58
End: 2018-09-25
Attending: FAMILY MEDICINE
Payer: MEDICARE

## 2018-09-25 ENCOUNTER — HOSPITAL ENCOUNTER (OUTPATIENT)
Dept: RADIOLOGY | Facility: HOSPITAL | Age: 58
Discharge: HOME | End: 2018-09-25
Attending: FAMILY MEDICINE
Payer: MEDICARE

## 2018-09-25 DIAGNOSIS — I10 ESSENTIAL HYPERTENSION, BENIGN: Primary | ICD-10-CM

## 2018-09-25 DIAGNOSIS — L89.324 STAGE IV PRESSURE ULCER OF LEFT BUTTOCK (HCC): Primary | ICD-10-CM

## 2018-09-25 DIAGNOSIS — Z86.39 HISTORY OF DIABETES MELLITUS: ICD-10-CM

## 2018-09-25 DIAGNOSIS — I10 ESSENTIAL HYPERTENSION, BENIGN: ICD-10-CM

## 2018-09-25 DIAGNOSIS — L89.324 STAGE IV PRESSURE ULCER OF LEFT BUTTOCK (HCC): ICD-10-CM

## 2018-09-25 LAB
ALBUMIN SERPL BCP-MCNC: 2.2 G/DL (ref 3.5–5)
ALP SERPL-CCNC: 78 U/L (ref 46–116)
ALT SERPL W P-5'-P-CCNC: 25 U/L (ref 12–78)
ANION GAP SERPL CALCULATED.3IONS-SCNC: 6 MMOL/L (ref 4–13)
AST SERPL W P-5'-P-CCNC: 19 U/L (ref 5–45)
BASOPHILS # BLD AUTO: 0.07 THOUSANDS/ΜL (ref 0–0.1)
BASOPHILS NFR BLD AUTO: 1 % (ref 0–1)
BILIRUB SERPL-MCNC: 0.13 MG/DL (ref 0.2–1)
BUN SERPL-MCNC: 13 MG/DL (ref 5–25)
CALCIUM SERPL-MCNC: 8.8 MG/DL (ref 8.3–10.1)
CHLORIDE SERPL-SCNC: 105 MMOL/L (ref 100–108)
CHOLEST SERPL-MCNC: 144 MG/DL (ref 50–200)
CO2 SERPL-SCNC: 29 MMOL/L (ref 21–32)
CREAT SERPL-MCNC: 0.5 MG/DL (ref 0.6–1.3)
EOSINOPHIL # BLD AUTO: 0.09 THOUSAND/ΜL (ref 0–0.61)
EOSINOPHIL NFR BLD AUTO: 1 % (ref 0–6)
ERYTHROCYTE [DISTWIDTH] IN BLOOD BY AUTOMATED COUNT: 16.5 % (ref 11.6–15.1)
EST. AVERAGE GLUCOSE BLD GHB EST-MCNC: 114 MG/DL
GFR SERPL CREATININE-BSD FRML MDRD: 120 ML/MIN/1.73SQ M
GLUCOSE P FAST SERPL-MCNC: 76 MG/DL (ref 65–99)
HBA1C MFR BLD: 5.6 % (ref 4.2–6.3)
HCT VFR BLD AUTO: 35.1 % (ref 36.5–49.3)
HDLC SERPL-MCNC: 37 MG/DL (ref 40–60)
HGB BLD-MCNC: 10.7 G/DL (ref 12–17)
IMM GRANULOCYTES # BLD AUTO: 0.02 THOUSAND/UL (ref 0–0.2)
IMM GRANULOCYTES NFR BLD AUTO: 0 % (ref 0–2)
LDLC SERPL CALC-MCNC: 87 MG/DL (ref 0–100)
LYMPHOCYTES # BLD AUTO: 1.7 THOUSANDS/ΜL (ref 0.6–4.47)
LYMPHOCYTES NFR BLD AUTO: 20 % (ref 14–44)
MCH RBC QN AUTO: 25.2 PG (ref 26.8–34.3)
MCHC RBC AUTO-ENTMCNC: 30.5 G/DL (ref 31.4–37.4)
MCV RBC AUTO: 83 FL (ref 82–98)
MONOCYTES # BLD AUTO: 0.52 THOUSAND/ΜL (ref 0.17–1.22)
MONOCYTES NFR BLD AUTO: 6 % (ref 4–12)
NEUTROPHILS # BLD AUTO: 6.13 THOUSANDS/ΜL (ref 1.85–7.62)
NEUTS SEG NFR BLD AUTO: 72 % (ref 43–75)
NONHDLC SERPL-MCNC: 107 MG/DL
NRBC BLD AUTO-RTO: 0 /100 WBCS
PLATELET # BLD AUTO: 466 THOUSANDS/UL (ref 149–390)
PMV BLD AUTO: 9.4 FL (ref 8.9–12.7)
POTASSIUM SERPL-SCNC: 3.7 MMOL/L (ref 3.5–5.3)
PROT SERPL-MCNC: 8.2 G/DL (ref 6.4–8.2)
RBC # BLD AUTO: 4.24 MILLION/UL (ref 3.88–5.62)
SODIUM SERPL-SCNC: 140 MMOL/L (ref 136–145)
TRIGL SERPL-MCNC: 100 MG/DL
TSH SERPL DL<=0.05 MIU/L-ACNC: 1.95 UIU/ML (ref 0.36–3.74)
WBC # BLD AUTO: 8.53 THOUSAND/UL (ref 4.31–10.16)

## 2018-09-25 PROCEDURE — 80061 LIPID PANEL: CPT

## 2018-09-25 PROCEDURE — 83036 HEMOGLOBIN GLYCOSYLATED A1C: CPT

## 2018-09-25 PROCEDURE — 84443 ASSAY THYROID STIM HORMONE: CPT

## 2018-09-25 PROCEDURE — 80053 COMPREHEN METABOLIC PANEL: CPT

## 2018-09-25 PROCEDURE — 72220 X-RAY EXAM SACRUM TAILBONE: CPT

## 2018-09-25 PROCEDURE — 36415 COLL VENOUS BLD VENIPUNCTURE: CPT

## 2018-09-25 PROCEDURE — 85025 COMPLETE CBC W/AUTO DIFF WBC: CPT

## 2018-10-09 DIAGNOSIS — G82.20 PARAPLEGIC SPINAL PARALYSIS (HCC): ICD-10-CM

## 2018-10-09 RX ORDER — OXYCODONE HYDROCHLORIDE 20 MG/1
20 TABLET ORAL EVERY 8 HOURS PRN
Qty: 90 TABLET | Refills: 0 | Status: SHIPPED | OUTPATIENT
Start: 2018-10-09 | End: 2018-11-14 | Stop reason: SDUPTHER

## 2018-10-11 ENCOUNTER — APPOINTMENT (OUTPATIENT)
Dept: WOUND CARE | Facility: HOSPITAL | Age: 58
End: 2018-10-11
Payer: MEDICARE

## 2018-10-11 PROCEDURE — 11045 DBRDMT SUBQ TISS EACH ADDL: CPT | Performed by: FAMILY MEDICINE

## 2018-10-11 PROCEDURE — 11042 DBRDMT SUBQ TIS 1ST 20SQCM/<: CPT | Performed by: FAMILY MEDICINE

## 2018-10-22 ENCOUNTER — APPOINTMENT (OUTPATIENT)
Dept: WOUND CARE | Facility: HOSPITAL | Age: 58
End: 2018-10-22
Payer: MEDICARE

## 2018-10-22 PROCEDURE — 11042 DBRDMT SUBQ TIS 1ST 20SQCM/<: CPT | Performed by: SURGERY

## 2018-10-30 ENCOUNTER — OFFICE VISIT (OUTPATIENT)
Dept: INFECTIOUS DISEASES | Facility: CLINIC | Age: 58
End: 2018-10-30
Payer: MEDICARE

## 2018-10-30 VITALS
TEMPERATURE: 97.9 F | BODY MASS INDEX: 27.1 KG/M2 | SYSTOLIC BLOOD PRESSURE: 98 MMHG | HEIGHT: 67 IN | DIASTOLIC BLOOD PRESSURE: 54 MMHG

## 2018-10-30 DIAGNOSIS — Z86.19 HISTORY OF CLOSTRIDIUM DIFFICILE COLITIS: ICD-10-CM

## 2018-10-30 DIAGNOSIS — L89.154 STAGE IV PRESSURE ULCER OF SACRAL REGION (HCC): Primary | Chronic | ICD-10-CM

## 2018-10-30 DIAGNOSIS — G82.20 PARAPLEGIC SPINAL PARALYSIS (HCC): ICD-10-CM

## 2018-10-30 PROCEDURE — 99204 OFFICE O/P NEW MOD 45 MIN: CPT | Performed by: INTERNAL MEDICINE

## 2018-10-30 NOTE — PROGRESS NOTES
Consultation - Infectious Disease   Fara Preciado Moscat 63 y o  male MRN: [id number]  Unit/Bed#:  Encounter: [id number]    History and physical performed with a     IMPRESSION & RECOMMENDATIONS:   1    Stage IV sacral ulcer: On exam patient's would is clearly deep but does not appear acutely infected  Patient has had previous closure of this wound which has failed given ongoing issues of pressure and offloading given his difficulty home situation  Otherwise the patient is not systemically ill   -at this time there is no role for ongoing or suppressive antibiotic therapy in the setting of his chronic wound unless there is plan for closure   -there are currently no studies that show a proven benefit for chronic suppressive therapy in this situation   -additionally given the patient's documented history of prior C diff would avoid antibiotics unless absolutely necessary  -patient is planned to be seen by plastics this month  -would recommend continued aggressive wound care  -would re-evaluate the patient's home situation for additional risk factor modification  -if there is a plan for potential closure, would discussed bone biopsy and culture at that time  Despite bone being exposed the patient may not in fact have osteomyelitis hence bone biopsy would be helpful as it would change duration of antibiotic therapy after potential closure  (Reference:Osteomyelitis complicating sacral pressure ulcers: whether or not to treat with antibiotic therapy  Clin Infect Dis  2018 Jul 7  doi: 10 [id number]        2   Prior history of C diff:   History noted in the patient's PCPs note from 09/21/2018  No formal testing noted on results review   -again as above would avoid antibiotics unless absolutely necessary   -additional testing for C diff only based on patient's symptoms as per his primary  3   Paraplegia: wheelchair bound attempts off loading at home       The above plan was discussed in detail with the patient  Who understands is in agreement about using antibiotics judiciously and particularly if there is a plan for closure  Patient can follow up otherwise with the ID clinic as needed  HISTORY OF PRESENT ILLNESS:  Reason for Consult: sacral wound     HPI: Sarah Harding is a 63y o  year old male with past medical history significant for anemia, blindness, prior colostomy, diabetes, GERD, right leg amputation, neurogenic bladder and paraplegia with chronic wounds  Wound care visit notes reviewed  The patient has had multiple procedures in the past for potential closure of the sacral wound  Most recent procedure noted in the chart is from October of last year where he had really advancement flap and posterior thigh flap  The patient ultimately suffered reopening of these wounds due to ongoing pressure  He was recommended by plastics to have MRI but has been unable to get it to issues with claustrophobia  He continues to follow with wound care closely  The concern at this time is for poor closure of his sacral defect and reopening with recent x-ray concerning for ongoing osteomyelitis and role for antibiotic therapy  Patient currently reports that he has visiting nurse 3 times a week  There has been no reports him about progression or worsening of the wounds  Wounds on his knees are healing  He reports that at home he cares for himself has been trying to offload pressure on the sacral ulcer  He reports though that with transfers pressure persist and now he is on a [employer] lift  He denies having any other care or support at home  Currently he denies having any fevers, chills, nausea, vomiting, chest pain, shortness of breath  He denies having ongoing or increasing pain at the ulcer site  He reports that he is planned to be seen by plastics November  His vitals are otherwise stable he has no significant complaints at this time    We are consulted to discuss the role of potential antibiotic therapy given his poorly closed wound  REVIEW OF SYSTEMS:  A complete 12 point system-based review of systems is otherwise negative  PAST MEDICAL HISTORY:  Past Medical History:   Diagnosis Date    Anemia     Blind     r eye    [facility] ([facility] )     Diabetes mellitus ([place] [address] )     Poorly controlled type 2; Last Assessed:  3/18/14    GERD (gastroesophageal reflux disease)     History of diabetes mellitus     History of osteomyelitis     [facility]     r high upper leg    Hyperlipidemia     Neurogenic bladder     Paralysis ([facility] )     Spinal cord cysts     Ulcer of sacral region [facility])      Past Surgical History:   Procedure Laterality Date    AMPUTATION      At hip; Last Assessed:  1/19/16    BLADDER SURGERY      COLON SURGERY      llq ostomy pouch    COLOSTOMY      LEG AMPUTATION      OR ADJ TISS XFER SCALP,EXTREM 10 1-30 SQCM Left 5/1/2017    Procedure: POSTERIOR THIGH V-Y ADMANCEMENT;  Surgeon: Keli Lovett MD;  [facility];  Service: Plastics    OR MUSCLE-SKIN FLAP,TRUNK Left 5/1/2017    Procedure: FLAP CLOSURE LEFT ISCHIAL WOUND and "RIGHT" ISCHIAL FLAP ADVANCEMENT * DEBRIDEMENT, [place] ;  Surgeon: Keli Lovett MD;  [facility];  Service: Plastics    OR MUSCLE-SKIN FLAP,TRUNK Left 9/27/2017    Procedure: gluteal myocutaneous rotational flap, posterior thigh v to y advancement- wound 5 x 2 5 x 8;  Surgeon: Keli Lovett MD;  [facility];  Service: Plastics    SPINE SURGERY      Lower back       FAMILY HISTORY:  Non-contributory    SOCIAL HISTORY:  Social History   History   Alcohol Use No     Comment: Per Allscripts:  Social drinker (Onset date:  11/10/17)     History   Drug Use No     History   Smoking Status    Former Smoker   Smokeless Tobacco    Never Used     Comment: Onset date:  11/10/17       ALLERGIES:  No Known Allergies    MEDICATIONS:  All current active medications have been reviewed      PHYSICAL EXAM:  Vitals: 10/30/18 0859   BP: 98/54   Temp: 97 9 °F (36 6 °C)   Height: 5' 7" (1 702 m)         General Appearance:  Appearing well, nontoxic, and in no distress   Head:  Normocephalic, without obvious abnormality, atraumatic   Eyes:  Conjunctiva pink and sclera anicteric, both eyes   Nose: Nares normal, mucosa normal, no drainage   Throat: Oropharynx moist without lesions   Neck: Supple, symmetrical, no adenopathy, no tenderness/mass/nodules   Back:   Bandaging opened on the patient's large sacral defect and packing removed  There is no induration or erythema around the tissues no fluctuance noted  There is no foul smell coming from the  The inner aspect of the wound itself is pink with good granulation tissue  Multiple tracts noted within the wound itself  Lungs:   Clear to auscultation bilaterally, respirations unlabored   Chest Wall:  No tenderness or deformity   Heart:  RRR; no murmur, rub or gallop   Abdomen:   Soft, non-tender, non-distended, positive bowel sounds, ostomy in place  Extremities: No cyanosis, clubbing or edema in the left lower extremity   Skin: No rashes or lesions  No draining wounds noted  Additional wounds noted along the left knee which all have epithelial tissue along the and no drainage  Lymph nodes: Cervical, supraclavicular nodes normal   Neurologic: Alert and oriented times 3, patient is able to freely move his arms against gravity  LABS, IMAGING, & OTHER STUDIES:  Lab Results:  I have personally reviewed pertinent labs    Lab Results   Component Value Date     09/25/2018    K 3 7 09/25/2018     09/25/2018    CO2 29 09/25/2018    ANIONGAP 8 02/17/2014    BUN 13 09/25/2018    CREATININE 0 50 (L) 09/25/2018    GLUCOSE 92 02/17/2014    GLUF 76 09/25/2018    CALCIUM 8 8 09/25/2018    AST 19 09/25/2018    ALT 25 09/25/2018    ALKPHOS 78 09/25/2018    PROT 8 0 02/17/2014    BILITOT 0 3 02/17/2014    EGFR 120 09/25/2018     Lab Results   Component Value Date    WBC 8 53 09/25/2018    HGB 10 7 (L) 09/25/2018    HCT 35 1 (L) 09/25/2018    MCV 83 09/25/2018     (H) 09/25/2018   No results found for: SEDRATE      Imaging Studies:   I have personally reviewed pertinent imaging study reports and images in PACS  Other Studies:   I have personally reviewed pertinent reports

## 2018-11-12 ENCOUNTER — APPOINTMENT (OUTPATIENT)
Dept: WOUND CARE | Facility: HOSPITAL | Age: 58
End: 2018-11-12
Payer: MEDICARE

## 2018-11-12 DIAGNOSIS — S31.000A WOUND OF SACRAL REGION, INITIAL ENCOUNTER: Primary | ICD-10-CM

## 2018-11-12 PROBLEM — L89.324 DECUBITUS ULCER OF ISCHIUM, LEFT, STAGE IV (HCC): Status: ACTIVE | Noted: 2018-11-12

## 2018-11-12 PROCEDURE — 99214 OFFICE O/P EST MOD 30 MIN: CPT

## 2018-11-13 DIAGNOSIS — E11.9 TYPE 2 DIABETES MELLITUS WITHOUT COMPLICATION, WITHOUT LONG-TERM CURRENT USE OF INSULIN (HCC): ICD-10-CM

## 2018-11-13 DIAGNOSIS — M54.50 CHRONIC LOW BACK PAIN WITHOUT SCIATICA, UNSPECIFIED BACK PAIN LATERALITY: ICD-10-CM

## 2018-11-13 DIAGNOSIS — K21.9 GASTROESOPHAGEAL REFLUX DISEASE WITHOUT ESOPHAGITIS: ICD-10-CM

## 2018-11-13 DIAGNOSIS — G89.29 CHRONIC LOW BACK PAIN WITHOUT SCIATICA, UNSPECIFIED BACK PAIN LATERALITY: ICD-10-CM

## 2018-11-14 DIAGNOSIS — G82.20 PARAPLEGIC SPINAL PARALYSIS (HCC): ICD-10-CM

## 2018-11-14 RX ORDER — LANCETS
EACH MISCELLANEOUS
Qty: 102 EACH | Refills: 0 | Status: SHIPPED | OUTPATIENT
Start: 2018-11-14 | End: 2019-01-18 | Stop reason: SDUPTHER

## 2018-11-14 RX ORDER — OMEPRAZOLE 20 MG/1
CAPSULE, DELAYED RELEASE ORAL
Qty: 90 CAPSULE | Refills: 0 | Status: SHIPPED | OUTPATIENT
Start: 2018-11-14 | End: 2019-04-23

## 2018-11-14 RX ORDER — OXYCODONE HYDROCHLORIDE 20 MG/1
20 TABLET ORAL EVERY 8 HOURS PRN
Qty: 90 TABLET | Refills: 0 | Status: SHIPPED | OUTPATIENT
Start: 2018-11-14 | End: 2018-12-18 | Stop reason: SDUPTHER

## 2018-11-14 RX ORDER — IBUPROFEN 800 MG/1
TABLET ORAL
Qty: 60 TABLET | Refills: 0 | Status: SHIPPED | OUTPATIENT
Start: 2018-11-14 | End: 2019-01-15 | Stop reason: SDUPTHER

## 2018-11-19 ENCOUNTER — APPOINTMENT (OUTPATIENT)
Dept: WOUND CARE | Facility: HOSPITAL | Age: 58
End: 2018-11-19
Payer: MEDICARE

## 2018-11-19 PROCEDURE — 99214 OFFICE O/P EST MOD 30 MIN: CPT | Performed by: FAMILY MEDICINE

## 2018-11-28 ENCOUNTER — TELEPHONE (OUTPATIENT)
Dept: FAMILY MEDICINE CLINIC | Facility: CLINIC | Age: 58
End: 2018-11-28

## 2018-12-18 ENCOUNTER — TELEPHONE (OUTPATIENT)
Dept: FAMILY MEDICINE CLINIC | Facility: CLINIC | Age: 58
End: 2018-12-18

## 2018-12-18 DIAGNOSIS — G82.20 PARAPLEGIC SPINAL PARALYSIS (HCC): ICD-10-CM

## 2018-12-18 RX ORDER — OXYCODONE HYDROCHLORIDE 20 MG/1
20 TABLET ORAL EVERY 8 HOURS PRN
Qty: 90 TABLET | Refills: 0 | Status: SHIPPED | OUTPATIENT
Start: 2018-12-18 | End: 2019-01-18 | Stop reason: SDUPTHER

## 2018-12-21 ENCOUNTER — OFFICE VISIT (OUTPATIENT)
Dept: PLASTIC SURGERY | Facility: CLINIC | Age: 58
End: 2018-12-21
Payer: MEDICARE

## 2018-12-21 VITALS — HEIGHT: 67 IN | WEIGHT: 185 LBS | BODY MASS INDEX: 29.03 KG/M2

## 2018-12-21 DIAGNOSIS — S88.911A AMPUTATED RIGHT LEG (HCC): Primary | ICD-10-CM

## 2018-12-21 DIAGNOSIS — Z87.39 HISTORY OF OSTEOMYELITIS: ICD-10-CM

## 2018-12-21 DIAGNOSIS — Z86.39 HISTORY OF DIABETES MELLITUS: ICD-10-CM

## 2018-12-21 DIAGNOSIS — L89.324 DECUBITUS ULCER OF ISCHIUM, LEFT, STAGE IV (HCC): ICD-10-CM

## 2018-12-21 PROCEDURE — 99214 OFFICE O/P EST MOD 30 MIN: CPT | Performed by: PLASTIC SURGERY

## 2018-12-21 NOTE — PROGRESS NOTES
Plastic & Reconstructive Surgery  Office Visit      Sarita Linn Moscat 63 y o  male   MRN: [id number]  Unit/Bed#:    Encounter: [id number]      Assessment:   Bolivar Villalobos is a 63 y o  male well known to the plastic surgery service, following up w/ chronic L ischium pressure ulcer, x2 prior failed attempts     Patient Active Problem List   Diagnosis    E  coli UTI (urinary tract infection)    Stage IV pressure ulcer of sacral region ([address] )    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, stage 3 (HCC)    Stage IV pressure ulcer of left heel (HCC)    Cyst of joint of shoulder    Anemia    Paraplegic spinal paralysis ([address] )    Sinus tachycardia    GERD (gastroesophageal reflux disease)    History of osteomyelitis    History of diabetes mellitus    Anxiety    Amputated right leg ([address] )    Benign essential hypertension    DM type 2 (diabetes mellitus, type 2) ([address] )    Diarrhea    Decubitus ulcer of ischium, left, stage IV ([address] )     Plan:   1  Mr Amanda Eaton returns today in follow up for L ischial wound-he is undergoing daily dressing changes   Long discussion once again regarding his overall options for this challenging problem which included   · Palliative wound care: continue local wound care at the center with the understanding that healing by secondary intention will not happen and he is at risk for sepsis and infection of this space   · Re-advancement of the V-Y posterior thigh flaps, together with biceps femoris and potential TFL flap as well for obliteration of dead space, otherwise recurrence is certain, which he is interested in exploring further   · Hip disarticulation, amputation and anterior thigh flap as he had done to the contralateral extremity-this is essentially our last resort option given this would destabalize his pelvis to a degree and impact his overall ability to mobilize   2   We discussed his overall poor state of nutrition, last albumin was only 2 2 and per pt this is despite 3x daily glucernas and eggs, would like to have him f/u w/ nutrition consult in order to optimize his intake and improve his overall state, at his current level impaired wound healing is certain   3  Once again discussed the need for CT imaging in order to ascertain extent of cavity, he will attempt to schedule this  4  We will plan to see him back in 2 months hopefully w/ imaging in order to further pursue operative plans      Subjective/Objective   Chief Complaint: L ischial wound    Subjective:   Pt reports intermittent soreness around the area with dressing changes, otherwise denies any recent infections, hospitalizations, or illness   He reports that he drinks x3 glucerna daily, and eats eggs for breakfast   He has never seen a nutritionist for a nutrition plan     HPI:  Mr Bakari Salinas  Is a 63years old male with history of quadriplegic who presents for evaluation of left ischium wound  Patient has had this wound for many years and is recently s/p left posterior thigh V-Y advancement flap and right ischial flap in 5/01/2017 by Dr Ely Fajardo and unfortunately sustained a heating pad burn and wound dehiscence that require surgery on 9/27/2017 with gluteal myocutaneous rotational flap, posterior V-Y re-advancement  Therafter patient did well but developed recurrence of wound and is here for evaluation  Patient currently lays on his stomach mostly to avoid pressure to the area  He was ordered a MRI on prior encounter to evaluate for osteomyelitis extend but he is claustrophobic, and was therefore ordered a CT but he has not obtained this  Of notice, patient had prior decubitus ulcers on his right trunk for which underwent multiple operations, including a hip disarticulation, amputation and anterior thigh flap at outside facility  Height 5' 7" (1 702 m), weight 83 9 kg (185 lb)  ,Body mass index is 28 98 kg/m²    Ht 5' 7" (1 702 m)   Wt 83 9 kg (185 lb)   BMI 28 98 kg/m²       Invasive Devices     Drain Colostomy Descending/sigmoid LLQ -- days    Closed/Suction Drain Left;Posterior; Other (Comment) Buttock Bulb 15 Fr  449 days              Review of Systems   HENT: Negative  Eyes: Positive for visual disturbance  Respiratory: Negative  Cardiovascular: Negative  Gastrointestinal: Negative  Endocrine: Negative  Genitourinary: Positive for difficulty urinating  Musculoskeletal: Positive for gait problem  Skin: Positive for wound  Neurological: Positive for weakness  Hematological: Negative  Psychiatric/Behavioral: Negative  Physical Exam:  Constitutional: He appears well-developed  HENT:   Head: Normocephalic and atraumatic  Cardiovascular: Normal rate  Pulmonary/Chest: Effort normal    Abdominal: Soft     Ostomy viable and functional     Musculoskeletal:   S/p right lower extremity amputation    L ischial wound w/ significant undermining in the cephalad direction, clean granulation bed, some fibrinous exudate, but no vianca drainage   Overall appears slightly smaller vs prior visit   Lab, Imaging and other studies:Last albumin 2 2 indicative of malnutrition     Gary Raya [id number]  Plastic & Reconstructive Surgery   [phone]  12/21/2018 II 10:13 AM

## 2019-01-04 ENCOUNTER — APPOINTMENT (OUTPATIENT)
Dept: WOUND CARE | Facility: HOSPITAL | Age: 59
End: 2019-01-04
Payer: MEDICARE

## 2019-01-04 PROCEDURE — 99213 OFFICE O/P EST LOW 20 MIN: CPT | Performed by: FAMILY MEDICINE

## 2019-01-10 ENCOUNTER — TELEPHONE (OUTPATIENT)
Dept: FAMILY MEDICINE CLINIC | Facility: CLINIC | Age: 59
End: 2019-01-10

## 2019-01-10 DIAGNOSIS — G82.20 PARAPLEGIC SPINAL PARALYSIS (HCC): ICD-10-CM

## 2019-01-10 RX ORDER — OXYCODONE HYDROCHLORIDE 20 MG/1
20 TABLET ORAL EVERY 8 HOURS PRN
Qty: 90 TABLET | Refills: 0 | Status: CANCELLED | OUTPATIENT
Start: 2019-01-10

## 2019-01-15 ENCOUNTER — OFFICE VISIT (OUTPATIENT)
Dept: FAMILY MEDICINE CLINIC | Facility: CLINIC | Age: 59
End: 2019-01-15

## 2019-01-15 ENCOUNTER — APPOINTMENT (OUTPATIENT)
Dept: LAB | Facility: CLINIC | Age: 59
End: 2019-01-15
Payer: MEDICARE

## 2019-01-15 ENCOUNTER — TRANSCRIBE ORDERS (OUTPATIENT)
Dept: LAB | Facility: CLINIC | Age: 59
End: 2019-01-15

## 2019-01-15 VITALS
DIASTOLIC BLOOD PRESSURE: 68 MMHG | TEMPERATURE: 96 F | RESPIRATION RATE: 18 BRPM | SYSTOLIC BLOOD PRESSURE: 110 MMHG | OXYGEN SATURATION: 98 % | HEART RATE: 75 BPM

## 2019-01-15 DIAGNOSIS — Z12.11 COLON CANCER SCREENING: ICD-10-CM

## 2019-01-15 DIAGNOSIS — F11.90 OPIOID USE: ICD-10-CM

## 2019-01-15 DIAGNOSIS — F11.90 CHRONIC NARCOTIC USE: ICD-10-CM

## 2019-01-15 DIAGNOSIS — M54.50 CHRONIC LOW BACK PAIN WITHOUT SCIATICA, UNSPECIFIED BACK PAIN LATERALITY: Primary | ICD-10-CM

## 2019-01-15 DIAGNOSIS — G82.20 PARAPLEGIC SPINAL PARALYSIS (HCC): ICD-10-CM

## 2019-01-15 DIAGNOSIS — G89.29 CHRONIC LOW BACK PAIN WITHOUT SCIATICA, UNSPECIFIED BACK PAIN LATERALITY: Primary | ICD-10-CM

## 2019-01-15 DIAGNOSIS — K21.9 GASTROESOPHAGEAL REFLUX DISEASE WITHOUT ESOPHAGITIS: ICD-10-CM

## 2019-01-15 PROCEDURE — 80307 DRUG TEST PRSMV CHEM ANLYZR: CPT

## 2019-01-15 PROCEDURE — 80365 DRUG SCREENING OXYCODONE: CPT

## 2019-01-15 PROCEDURE — 36415 COLL VENOUS BLD VENIPUNCTURE: CPT

## 2019-01-15 PROCEDURE — 99214 OFFICE O/P EST MOD 30 MIN: CPT | Performed by: FAMILY MEDICINE

## 2019-01-15 RX ORDER — OMEPRAZOLE 20 MG/1
20 CAPSULE, DELAYED RELEASE ORAL
Qty: 90 CAPSULE | Refills: 1 | Status: SHIPPED | OUTPATIENT
Start: 2019-01-15 | End: 2019-04-23

## 2019-01-15 RX ORDER — IBUPROFEN 800 MG/1
800 TABLET ORAL DAILY PRN
Qty: 60 TABLET | Refills: 0 | Status: SHIPPED | OUTPATIENT
Start: 2019-01-15 | End: 2019-02-19 | Stop reason: SDUPTHER

## 2019-01-15 NOTE — PROGRESS NOTES
Assessment/Plan:    Paraplegic spinal paralysis ([facility] )  Needs new updated motorized wheelchair     Opioid use  Patient self caths so unable to give urine sample  Blood drug screen done today  Dicussed potentially addicting effects of narcotics  Discussed Naloxone  Reviewed narcotic contract today     Oxycodone is being used for sacral ulcer, LBP as well phantom limb pain          Problem List Items Addressed This Visit        Digestive    GERD (gastroesophageal reflux disease)    Relevant Medications    omeprazole (PriLOSEC) 20 mg delayed release capsule       Nervous and Auditory    Paraplegic spinal paralysis ([facility] )     Needs new updated motorized wheelchair             Other    Opioid use     Patient self caths so unable to give urine sample  Blood drug screen done today  Dicussed potentially addicting effects of narcotics  Discussed Naloxone  Reviewed narcotic contract today     Oxycodone is being used for sacral ulcer, LBP as well phantom limb pain          Relevant Orders    Drug screen panel 1, whole blood      Other Visit Diagnoses     Chronic low back pain without sciatica, unspecified back pain laterality    -  Primary    Relevant Medications    ibuprofen (MOTRIN) 800 mg tablet    Colon cancer screening        Relevant Orders    Ambulatory referral to Gastroenterology    Chronic narcotic use        Relevant Orders    Drug screen panel 1, whole blood            Subjective:      Patient ID: Magdadalia Rodriguez is a 63 y o  male  62 yo  male with paraplegic spinal paralysis with R leg disarticulation at the hip here today for wheelchair evaluation  He has been using a motorized wheel chair for several years given his paraplegic spinal paralysis and requires a new motorized wheelchair, given Medicare requirements he must come in for an evaluation in order to obtain it  Patient is completely dependent on his motorized wheel chair to be able to move both inside his home and outside   He is unable to use a crutch, cane or manual wheelchair given R elbow surgery and R arm and hand weakness  Patient requires the motorized chair in order to complete activities of daily living and be independent  He is able to sit upright in an appropriate motorized wheelchair and maneuver it on his own  He has been doing so for over 5 years  He requires Oxycodone to control his pain  Last Oxycodone was last night   Denies illegal drug use            The following portions of the patient's history were reviewed and updated as appropriate:   He  has a past medical history of Anemia; Blind; Colostomy in place [facility]); Diabetes mellitus ([facility] ); GERD (gastroesophageal reflux disease); History of diabetes mellitus; History of osteomyelitis; leg amputation ([facility] ); Hyperlipidemia; Neurogenic bladder; Paralysis ([place] [address] ); Spinal cord cysts; and Ulcer of sacral region [facility])  He   Patient Active Problem List    Diagnosis Date Noted    Opioid use 01/15/2019    Decubitus ulcer of ischium, left, stage IV ([facility] ) 11/12/2018    Diarrhea 09/21/2018    [facility] leg ([facility] ) 07/18/2018    Anxiety 04/23/2018    GERD (gastroesophageal reflux disease)     History of osteomyelitis     History of diabetes mellitus     Cyst of joint of shoulder 10/20/2016    Anemia 10/20/2016    Paraplegic spinal paralysis ([facility] ) 10/20/2016    Sinus tachycardia 10/20/2016    E  coli UTI (urinary tract infection) 10/18/2016    Stage IV pressure ulcer of sacral region ([facility] ) 10/15/2016    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, stage 3 ([facility] ) 10/15/2016    Stage IV pressure ulcer of left heel ([facility] ) 10/15/2016    DM type 2 (diabetes mellitus, type 2) ([facility] ) 03/07/2014    Benign essential hypertension 07/31/2013     He  has a past surgical history that includes Spine surgery; Leg amputation; Bladder surgery; Colostomy; Amputation; pr adj tiss xfer scalp,extrem 10 1-30 sqcm (Left, 5/1/2017); pr muscle-skin flap,trunk (Left, 5/1/2017);  Colon surgery; and pr muscle-skin flap,trunk (Left, 9/27/2017)  His family history includes No Known Problems in his father and mother  He  reports that he has quit smoking  He has never used smokeless tobacco  He reports that he does not drink alcohol or use drugs  Current Outpatient Prescriptions   Medication Sig Dispense Refill    ACCU-CHEK FASTCLIX LANCETS MISC TEST TWICE DAILY AS DIRECTED 102 each 0    ACCU-CHEK SMARTVIEW test strip TEST TWICE DAILY 100 each 1    aspirin 81 MG tablet Take 81 mg by mouth daily      collagenase (SANTYL) ointment Apply 1 application topically daily      Disposable Gloves (LATEX GLOVES LARGE) MISC Use as needed up to 3 times a day dispo 2 boxes 2 each 11    ibuprofen (MOTRIN) 800 mg tablet Take 1 tablet (800 mg total) by mouth daily as needed for moderate pain 60 tablet 0    Incontinence Supply Disposable (SUNBEAM INCONTINENT UNDER PAD) MISC Dispense 4 reusable under pads 4 each 11    Incontinence Supply Disposable MISC by Does not apply route 2 (two) times a day 60 each 11    LORazepam (ATIVAN) 1 mg tablet Take 1 tablet (1 mg total) by mouth daily 30 minutes prior to MRI 1 tablet 0    Nutritional Supplements (GLUCERNA 1 0 JOHANN/CARBSTEADY) LIQD Take 1 Can by mouth 3 (three) times a day Dispense 90 cans per month 237 mL 11    omeprazole (PriLOSEC) 20 mg delayed release capsule TAKE 1 CAPSULE BY MOUTH EVERY NIGHT AT BEDTIME 90 capsule 0    omeprazole (PriLOSEC) 20 mg delayed release capsule Take 1 capsule (20 mg total) by mouth daily at bedtime 90 capsule 1    oxyCODONE (ROXICODONE) 20 MG TABS Take 1 tablet (20 mg total) by mouth every 8 (eight) hours as needed for moderate pain Max Daily Amount: 60 mg 90 tablet 0    oxyCODONE (ROXICODONE) 5 mg immediate release tablet Take 1 tablet by mouth every 4 (four) hours as needed for moderate pain for up to 15 doses Earliest Fill Date: 10/4/17 Max Daily Amount: 30 mg 15 tablet 0     No current facility-administered medications for this visit  Current Outpatient Prescriptions on File Prior to Visit   Medication Sig    ACCU-CHEK FASTCLIX LANCETS MISC TEST TWICE DAILY AS DIRECTED    ACCU-CHEK SMARTVIEW test strip TEST TWICE DAILY    aspirin 81 MG tablet Take 81 mg by mouth daily    collagenase (SANTYL) ointment Apply 1 application topically daily    Disposable Gloves (LATEX GLOVES LARGE) MISC Use as needed up to 3 times a day dispo 2 boxes    Incontinence Supply Disposable (SUNBEAM INCONTINENT UNDER PAD) MISC Dispense 4 reusable under pads    Incontinence Supply Disposable MISC by Does not apply route 2 (two) times a day    LORazepam (ATIVAN) 1 mg tablet Take 1 tablet (1 mg total) by mouth daily 30 minutes prior to MRI    Nutritional Supplements (GLUCERNA 1 0 JOHANN/CARBSTEADY) LIQD Take 1 Can by mouth 3 (three) times a day Dispense 90 cans per month    omeprazole (PriLOSEC) 20 mg delayed release capsule TAKE 1 CAPSULE BY MOUTH EVERY NIGHT AT BEDTIME    oxyCODONE (ROXICODONE) 20 MG TABS Take 1 tablet (20 mg total) by mouth every 8 (eight) hours as needed for moderate pain Max Daily Amount: 60 mg    oxyCODONE (ROXICODONE) 5 mg immediate release tablet Take 1 tablet by mouth every 4 (four) hours as needed for moderate pain for up to 15 doses Earliest Fill Date: 10/4/17 Max Daily Amount: 30 mg    [DISCONTINUED] ibuprofen (MOTRIN) 800 mg tablet TAKE ONE TABLET BY MOUTH EVERY DAY AS NEEDED    [DISCONTINUED] omeprazole (PriLOSEC) 20 mg delayed release capsule TAKE 1 CAPSULE BY MOUTH EVERY NIGHT AT BEDTIME     No current facility-administered medications on file prior to visit       Review of Systems   Musculoskeletal: Positive for arthralgias and back pain  Complains of stiffness of L hand    Skin: Positive for wound  All other systems reviewed and are negative          Objective:      /68 (BP Location: Left arm, Patient Position: Sitting, Cuff Size: Large)   Pulse 75   Temp (!) 96 °F (35 6 °C) (Tympanic)   Resp 18   SpO2 98% Physical Exam   Constitutional: He is oriented to person, place, and time  He appears well-developed  HENT:   Head: Normocephalic  Right Ear: External ear normal    Left Ear: External ear normal    Nose: Nose normal    Mouth/Throat: Oropharynx is clear and moist    Eyes: Pupils are equal, round, and reactive to light  Conjunctivae and EOM are normal    Neck: Normal range of motion  Neck supple  No thyromegaly present  Cardiovascular: Normal rate, regular rhythm and normal heart sounds  Pulmonary/Chest: Effort normal and breath sounds normal    Abdominal: Soft  There is no tenderness  There is no rebound and no guarding  Musculoskeletal: Normal range of motion  He exhibits tenderness and deformity  Neurological: He is alert and oriented to person, place, and time  Skin: Skin is dry  Psychiatric: He has a normal mood and affect  Nursing note and vitals reviewed

## 2019-01-15 NOTE — ASSESSMENT & PLAN NOTE
Patient self caths so unable to give urine sample   Blood drug screen done today  Dicussed potentially addicting effects of narcotics  Discussed Naloxone  Reviewed narcotic contract today     Oxycodone is being used for sacral ulcer, LBP as well phantom limb pain

## 2019-01-18 ENCOUNTER — TELEPHONE (OUTPATIENT)
Dept: FAMILY MEDICINE CLINIC | Facility: CLINIC | Age: 59
End: 2019-01-18

## 2019-01-18 DIAGNOSIS — E11.9 TYPE 2 DIABETES MELLITUS WITHOUT COMPLICATION, WITHOUT LONG-TERM CURRENT USE OF INSULIN (HCC): ICD-10-CM

## 2019-01-18 DIAGNOSIS — G82.20 PARAPLEGIC SPINAL PARALYSIS (HCC): ICD-10-CM

## 2019-01-18 RX ORDER — LANCETS
EACH MISCELLANEOUS 2 TIMES DAILY
Qty: 102 EACH | Refills: 5 | Status: SHIPPED | OUTPATIENT
Start: 2019-01-18 | End: 2019-10-08 | Stop reason: SDUPTHER

## 2019-01-18 RX ORDER — OXYCODONE HYDROCHLORIDE 20 MG/1
20 TABLET ORAL EVERY 8 HOURS PRN
Qty: 90 TABLET | Refills: 0 | Status: SHIPPED | OUTPATIENT
Start: 2019-01-18 | End: 2019-02-18 | Stop reason: SDUPTHER

## 2019-01-23 LAB
AMPHETAMINES SERPL QL SCN: NEGATIVE NG/ML
BARBITURATES SERPL QL SCN: NEGATIVE UG/ML
BENZODIAZ SPEC QL: NEGATIVE NG/ML
CANNABINOIDS SERPL QL SCN: NEGATIVE NG/ML
COCAINE+BZE SERPL QL SCN: NEGATIVE NG/ML
OPIATES SERPL QL SCN: NEGATIVE NG/ML
OXYCODONE UR CFM-MCNC: POSITIVE NG/ML
OXYCODONE+OXYMORPHONE SERPLBLD QL SCN: ABNORMAL NG/ML
OXYMORPHONE FREE SERPL CFM-MCNC: NEGATIVE NG/ML
PCP SERPL QL SCN: NEGATIVE NG/ML
SL AMB OXYCODONE: 18.9 NG/ML

## 2019-02-15 ENCOUNTER — TELEPHONE (OUTPATIENT)
Dept: FAMILY MEDICINE CLINIC | Facility: CLINIC | Age: 59
End: 2019-02-15

## 2019-02-18 DIAGNOSIS — G82.20 PARAPLEGIC SPINAL PARALYSIS (HCC): ICD-10-CM

## 2019-02-19 DIAGNOSIS — M54.50 CHRONIC LOW BACK PAIN WITHOUT SCIATICA, UNSPECIFIED BACK PAIN LATERALITY: ICD-10-CM

## 2019-02-19 DIAGNOSIS — G89.29 CHRONIC LOW BACK PAIN WITHOUT SCIATICA, UNSPECIFIED BACK PAIN LATERALITY: ICD-10-CM

## 2019-02-19 RX ORDER — IBUPROFEN 800 MG/1
TABLET ORAL
Qty: 60 TABLET | Refills: 0 | Status: SHIPPED | OUTPATIENT
Start: 2019-02-19 | End: 2019-05-17 | Stop reason: SDUPTHER

## 2019-02-19 RX ORDER — OXYCODONE HYDROCHLORIDE 20 MG/1
20 TABLET ORAL EVERY 8 HOURS PRN
Qty: 90 TABLET | Refills: 0 | Status: SHIPPED | OUTPATIENT
Start: 2019-02-19 | End: 2019-03-19 | Stop reason: SDUPTHER

## 2019-03-04 ENCOUNTER — TELEPHONE (OUTPATIENT)
Dept: FAMILY MEDICINE CLINIC | Facility: CLINIC | Age: 59
End: 2019-03-04

## 2019-03-04 NOTE — TELEPHONE ENCOUNTER
Ana stated she faxed over an prescription for detail for wheel chair repair  She wants to know if you received such document

## 2019-03-06 ENCOUNTER — APPOINTMENT (OUTPATIENT)
Dept: WOUND CARE | Facility: HOSPITAL | Age: 59
End: 2019-03-06
Payer: MEDICARE

## 2019-03-06 PROCEDURE — 99213 OFFICE O/P EST LOW 20 MIN: CPT | Performed by: FAMILY MEDICINE

## 2019-03-13 ENCOUNTER — APPOINTMENT (OUTPATIENT)
Dept: LAB | Facility: CLINIC | Age: 59
End: 2019-03-13
Payer: MEDICARE

## 2019-03-13 ENCOUNTER — TRANSCRIBE ORDERS (OUTPATIENT)
Dept: LAB | Facility: CLINIC | Age: 59
End: 2019-03-13

## 2019-03-13 DIAGNOSIS — I10 BENIGN ESSENTIAL HYPERTENSION: Primary | ICD-10-CM

## 2019-03-13 DIAGNOSIS — I10 BENIGN ESSENTIAL HYPERTENSION: ICD-10-CM

## 2019-03-13 DIAGNOSIS — N39.0 E. COLI UTI (URINARY TRACT INFECTION): ICD-10-CM

## 2019-03-13 DIAGNOSIS — B96.20 E. COLI UTI (URINARY TRACT INFECTION): ICD-10-CM

## 2019-03-13 LAB
BACTERIA UR QL AUTO: ABNORMAL /HPF
BILIRUB UR QL STRIP: NEGATIVE
CLARITY UR: ABNORMAL
COLOR UR: YELLOW
CREAT UR-MCNC: 57 MG/DL
GLUCOSE UR STRIP-MCNC: NEGATIVE MG/DL
HGB UR QL STRIP.AUTO: ABNORMAL
KETONES UR STRIP-MCNC: NEGATIVE MG/DL
LEUKOCYTE ESTERASE UR QL STRIP: ABNORMAL
MICROALBUMIN UR-MCNC: 136 MG/L (ref 0–20)
MICROALBUMIN/CREAT 24H UR: 239 MG/G CREATININE (ref 0–30)
MUCOUS THREADS UR QL AUTO: ABNORMAL
NITRITE UR QL STRIP: NEGATIVE
NON-SQ EPI CELLS URNS QL MICRO: ABNORMAL /HPF
OTHER CASTS: ABNORMAL
PH UR STRIP.AUTO: 6 [PH]
PROT UR STRIP-MCNC: ABNORMAL MG/DL
RBC #/AREA URNS AUTO: ABNORMAL /HPF
SP GR UR STRIP.AUTO: 1.02 (ref 1–1.03)
UROBILINOGEN UR QL STRIP.AUTO: 0.2 E.U./DL
WBC #/AREA URNS AUTO: ABNORMAL /HPF

## 2019-03-13 PROCEDURE — 82043 UR ALBUMIN QUANTITATIVE: CPT

## 2019-03-13 PROCEDURE — 81001 URINALYSIS AUTO W/SCOPE: CPT | Performed by: FAMILY MEDICINE

## 2019-03-13 PROCEDURE — 87186 SC STD MICRODIL/AGAR DIL: CPT | Performed by: FAMILY MEDICINE

## 2019-03-13 PROCEDURE — 82570 ASSAY OF URINE CREATININE: CPT

## 2019-03-13 PROCEDURE — 87077 CULTURE AEROBIC IDENTIFY: CPT | Performed by: FAMILY MEDICINE

## 2019-03-13 PROCEDURE — 87086 URINE CULTURE/COLONY COUNT: CPT | Performed by: FAMILY MEDICINE

## 2019-03-15 LAB — BACTERIA UR CULT: ABNORMAL

## 2019-03-19 DIAGNOSIS — G82.20 PARAPLEGIC SPINAL PARALYSIS (HCC): ICD-10-CM

## 2019-03-19 RX ORDER — OXYCODONE HYDROCHLORIDE 20 MG/1
20 TABLET ORAL EVERY 8 HOURS PRN
Qty: 90 TABLET | Refills: 0 | Status: SHIPPED | OUTPATIENT
Start: 2019-03-19 | End: 2019-04-23 | Stop reason: SDUPTHER

## 2019-03-21 DIAGNOSIS — N39.0 URINARY TRACT INFECTION ASSOCIATED WITH CATHETERIZATION OF URINARY TRACT, UNSPECIFIED INDWELLING URINARY CATHETER TYPE, INITIAL ENCOUNTER (HCC): Primary | ICD-10-CM

## 2019-03-21 DIAGNOSIS — T83.511A URINARY TRACT INFECTION ASSOCIATED WITH CATHETERIZATION OF URINARY TRACT, UNSPECIFIED INDWELLING URINARY CATHETER TYPE, INITIAL ENCOUNTER (HCC): Primary | ICD-10-CM

## 2019-03-21 RX ORDER — CIPROFLOXACIN 500 MG/1
500 TABLET, FILM COATED ORAL EVERY 12 HOURS SCHEDULED
Qty: 20 TABLET | Refills: 0 | Status: SHIPPED | OUTPATIENT
Start: 2019-03-21 | End: 2019-03-28 | Stop reason: SDUPTHER

## 2019-03-28 ENCOUNTER — TELEPHONE (OUTPATIENT)
Dept: FAMILY MEDICINE CLINIC | Facility: CLINIC | Age: 59
End: 2019-03-28

## 2019-03-28 DIAGNOSIS — T83.511A URINARY TRACT INFECTION ASSOCIATED WITH CATHETERIZATION OF URINARY TRACT, UNSPECIFIED INDWELLING URINARY CATHETER TYPE, INITIAL ENCOUNTER (HCC): ICD-10-CM

## 2019-03-28 DIAGNOSIS — N39.0 URINARY TRACT INFECTION ASSOCIATED WITH CATHETERIZATION OF URINARY TRACT, UNSPECIFIED INDWELLING URINARY CATHETER TYPE, INITIAL ENCOUNTER (HCC): ICD-10-CM

## 2019-03-28 RX ORDER — CIPROFLOXACIN 500 MG/1
500 TABLET, FILM COATED ORAL EVERY 12 HOURS SCHEDULED
Qty: 20 TABLET | Refills: 0 | Status: SHIPPED | OUTPATIENT
Start: 2019-03-28 | End: 2019-04-07

## 2019-03-28 NOTE — TELEPHONE ENCOUNTER
Patient called stating he got antibiotics Cipro 500 mg and he went to open the container in the bathroom and he dropped them in toilet  He needs a new prescription   He is aware that he is going to have to pay for them  Please send it to [facility]

## 2019-04-03 ENCOUNTER — APPOINTMENT (OUTPATIENT)
Dept: WOUND CARE | Facility: HOSPITAL | Age: 59
End: 2019-04-03
Payer: MEDICARE

## 2019-04-03 PROCEDURE — 11042 DBRDMT SUBQ TIS 1ST 20SQCM/<: CPT | Performed by: FAMILY MEDICINE

## 2019-04-18 ENCOUNTER — OFFICE VISIT (OUTPATIENT)
Dept: PLASTIC SURGERY | Facility: CLINIC | Age: 59
End: 2019-04-18
Payer: MEDICARE

## 2019-04-18 VITALS — BODY MASS INDEX: 30.61 KG/M2 | HEIGHT: 67 IN | WEIGHT: 195 LBS

## 2019-04-18 DIAGNOSIS — L89.154 STAGE IV PRESSURE ULCER OF SACRAL REGION (HCC): Primary | Chronic | ICD-10-CM

## 2019-04-18 PROCEDURE — 99213 OFFICE O/P EST LOW 20 MIN: CPT | Performed by: PLASTIC SURGERY

## 2019-04-23 ENCOUNTER — CONSULT (OUTPATIENT)
Dept: GASTROENTEROLOGY | Facility: MEDICAL CENTER | Age: 59
End: 2019-04-23
Payer: MEDICARE

## 2019-04-23 ENCOUNTER — TELEPHONE (OUTPATIENT)
Dept: FAMILY MEDICINE CLINIC | Facility: CLINIC | Age: 59
End: 2019-04-23

## 2019-04-23 VITALS — TEMPERATURE: 97.8 F | HEART RATE: 88 BPM | SYSTOLIC BLOOD PRESSURE: 106 MMHG | DIASTOLIC BLOOD PRESSURE: 60 MMHG

## 2019-04-23 DIAGNOSIS — G82.20 PARAPLEGIC SPINAL PARALYSIS (HCC): ICD-10-CM

## 2019-04-23 DIAGNOSIS — K21.9 GASTROESOPHAGEAL REFLUX DISEASE, ESOPHAGITIS PRESENCE NOT SPECIFIED: ICD-10-CM

## 2019-04-23 DIAGNOSIS — R10.13 DYSPEPSIA: ICD-10-CM

## 2019-04-23 DIAGNOSIS — Z93.3 COLOSTOMY IN PLACE (HCC): ICD-10-CM

## 2019-04-23 DIAGNOSIS — R10.13 EPIGASTRIC PAIN: ICD-10-CM

## 2019-04-23 DIAGNOSIS — Z12.11 COLON CANCER SCREENING: ICD-10-CM

## 2019-04-23 DIAGNOSIS — K21.9 GASTROESOPHAGEAL REFLUX DISEASE, ESOPHAGITIS PRESENCE NOT SPECIFIED: Primary | ICD-10-CM

## 2019-04-23 PROCEDURE — 99204 OFFICE O/P NEW MOD 45 MIN: CPT | Performed by: INTERNAL MEDICINE

## 2019-04-23 RX ORDER — ZINC OXIDE 13 %
1 CREAM (GRAM) TOPICAL DAILY
Qty: 30 CAPSULE | Refills: 0 | Status: SHIPPED | OUTPATIENT
Start: 2019-04-23 | End: 2019-05-23

## 2019-04-23 RX ORDER — SODIUM, POTASSIUM,MAG SULFATES 17.5-3.13G
1 SOLUTION, RECONSTITUTED, ORAL ORAL ONCE
Qty: 2 BOTTLE | Refills: 0 | Status: SHIPPED | OUTPATIENT
Start: 2019-04-23 | End: 2019-09-25 | Stop reason: HOSPADM

## 2019-04-23 RX ORDER — OMEPRAZOLE 40 MG/1
40 CAPSULE, DELAYED RELEASE ORAL DAILY
Qty: 30 CAPSULE | Refills: 2 | Status: SHIPPED | OUTPATIENT
Start: 2019-04-23 | End: 2019-04-23 | Stop reason: SDUPTHER

## 2019-04-23 RX ORDER — OMEPRAZOLE 40 MG/1
CAPSULE, DELAYED RELEASE ORAL
Qty: 90 CAPSULE | Refills: 2 | Status: ON HOLD | OUTPATIENT
Start: 2019-04-23 | End: 2019-09-15 | Stop reason: ALTCHOICE

## 2019-04-24 RX ORDER — OXYCODONE HYDROCHLORIDE 20 MG/1
20 TABLET ORAL EVERY 8 HOURS PRN
Qty: 90 TABLET | Refills: 0 | Status: SHIPPED | OUTPATIENT
Start: 2019-04-24 | End: 2019-05-17 | Stop reason: SDUPTHER

## 2019-04-26 ENCOUNTER — HOSPITAL ENCOUNTER (OUTPATIENT)
Dept: CT IMAGING | Facility: HOSPITAL | Age: 59
Discharge: HOME | End: 2019-04-26
Attending: PLASTIC SURGERY
Payer: MEDICARE

## 2019-04-26 DIAGNOSIS — S31.000A WOUND OF SACRAL REGION, INITIAL ENCOUNTER: ICD-10-CM

## 2019-04-26 PROCEDURE — 72193 CT PELVIS W/DYE: CPT

## 2019-04-26 RX ADMIN — IOHEXOL 100 ML: 350 INJECTION, SOLUTION INTRAVENOUS at 12:15

## 2019-04-29 ENCOUNTER — HOSPITAL ENCOUNTER (OUTPATIENT)
Dept: ULTRASOUND IMAGING | Facility: HOSPITAL | Age: 59
Discharge: HOME | End: 2019-04-29
Attending: INTERNAL MEDICINE
Payer: MEDICARE

## 2019-04-29 DIAGNOSIS — R10.13 EPIGASTRIC PAIN: ICD-10-CM

## 2019-04-29 PROCEDURE — 76705 ECHO EXAM OF ABDOMEN: CPT

## 2019-05-06 ENCOUNTER — OFFICE VISIT (OUTPATIENT)
Dept: FAMILY MEDICINE CLINIC | Facility: CLINIC | Age: 59
End: 2019-05-06

## 2019-05-06 ENCOUNTER — APPOINTMENT (OUTPATIENT)
Dept: LAB | Facility: CLINIC | Age: 59
End: 2019-05-06
Payer: MEDICARE

## 2019-05-06 VITALS
TEMPERATURE: 97.5 F | HEART RATE: 63 BPM | DIASTOLIC BLOOD PRESSURE: 70 MMHG | SYSTOLIC BLOOD PRESSURE: 110 MMHG | RESPIRATION RATE: 18 BRPM | OXYGEN SATURATION: 98 %

## 2019-05-06 DIAGNOSIS — B96.20 E. COLI UTI (URINARY TRACT INFECTION): ICD-10-CM

## 2019-05-06 DIAGNOSIS — G82.20 PARAPLEGIC SPINAL PARALYSIS (HCC): ICD-10-CM

## 2019-05-06 DIAGNOSIS — N39.0 E. COLI UTI (URINARY TRACT INFECTION): ICD-10-CM

## 2019-05-06 DIAGNOSIS — E11.49 CONTROLLED TYPE 2 DIABETES MELLITUS WITH OTHER NEUROLOGIC COMPLICATION, WITHOUT LONG-TERM CURRENT USE OF INSULIN (HCC): Primary | ICD-10-CM

## 2019-05-06 DIAGNOSIS — D64.9 ANEMIA, UNSPECIFIED TYPE: ICD-10-CM

## 2019-05-06 DIAGNOSIS — I10 BENIGN ESSENTIAL HYPERTENSION: ICD-10-CM

## 2019-05-06 DIAGNOSIS — E11.9 CONTROLLED TYPE 2 DIABETES MELLITUS WITHOUT COMPLICATION, WITHOUT LONG-TERM CURRENT USE OF INSULIN (HCC): ICD-10-CM

## 2019-05-06 DIAGNOSIS — S88.911A AMPUTATED RIGHT LEG (HCC): ICD-10-CM

## 2019-05-06 PROBLEM — M86.9 OSTEOMYELITIS OF PELVIC REGION (HCC): Status: ACTIVE | Noted: 2019-05-06

## 2019-05-06 PROBLEM — K55.069 MESENTERIC THROMBOSIS (HCC): Status: ACTIVE | Noted: 2019-05-06

## 2019-05-06 LAB
ALBUMIN SERPL BCP-MCNC: 3 G/DL (ref 3.5–5)
ALP SERPL-CCNC: 77 U/L (ref 46–116)
ALT SERPL W P-5'-P-CCNC: 11 U/L (ref 12–78)
ANION GAP SERPL CALCULATED.3IONS-SCNC: 4 MMOL/L (ref 4–13)
AST SERPL W P-5'-P-CCNC: 9 U/L (ref 5–45)
BACTERIA UR QL AUTO: ABNORMAL /HPF
BASOPHILS # BLD AUTO: 0.05 THOUSANDS/ΜL (ref 0–0.1)
BASOPHILS NFR BLD AUTO: 1 % (ref 0–1)
BILIRUB SERPL-MCNC: 0.37 MG/DL (ref 0.2–1)
BILIRUB UR QL STRIP: NEGATIVE
BUN SERPL-MCNC: 14 MG/DL (ref 5–25)
CALCIUM SERPL-MCNC: 8.6 MG/DL (ref 8.3–10.1)
CHLORIDE SERPL-SCNC: 106 MMOL/L (ref 100–108)
CHOLEST SERPL-MCNC: 161 MG/DL (ref 50–200)
CLARITY UR: ABNORMAL
CO2 SERPL-SCNC: 28 MMOL/L (ref 21–32)
COLOR UR: YELLOW
CREAT SERPL-MCNC: 0.48 MG/DL (ref 0.6–1.3)
CREAT UR-MCNC: 81.4 MG/DL
EOSINOPHIL # BLD AUTO: 0.16 THOUSAND/ΜL (ref 0–0.61)
EOSINOPHIL NFR BLD AUTO: 3 % (ref 0–6)
ERYTHROCYTE [DISTWIDTH] IN BLOOD BY AUTOMATED COUNT: 16.8 % (ref 11.6–15.1)
EST. AVERAGE GLUCOSE BLD GHB EST-MCNC: 103 MG/DL
GFR SERPL CREATININE-BSD FRML MDRD: 121 ML/MIN/1.73SQ M
GLUCOSE P FAST SERPL-MCNC: 67 MG/DL (ref 65–99)
GLUCOSE UR STRIP-MCNC: NEGATIVE MG/DL
HBA1C MFR BLD: 5.2 % (ref 4.2–6.3)
HCT VFR BLD AUTO: 41 % (ref 36.5–49.3)
HDLC SERPL-MCNC: 40 MG/DL (ref 40–60)
HGB BLD-MCNC: 12.2 G/DL (ref 12–17)
HGB UR QL STRIP.AUTO: ABNORMAL
IMM GRANULOCYTES # BLD AUTO: 0.02 THOUSAND/UL (ref 0–0.2)
IMM GRANULOCYTES NFR BLD AUTO: 0 % (ref 0–2)
KETONES UR STRIP-MCNC: NEGATIVE MG/DL
LDLC SERPL CALC-MCNC: 99 MG/DL (ref 0–100)
LEUKOCYTE ESTERASE UR QL STRIP: ABNORMAL
LYMPHOCYTES # BLD AUTO: 1.62 THOUSANDS/ΜL (ref 0.6–4.47)
LYMPHOCYTES NFR BLD AUTO: 27 % (ref 14–44)
MCH RBC QN AUTO: 25 PG (ref 26.8–34.3)
MCHC RBC AUTO-ENTMCNC: 29.8 G/DL (ref 31.4–37.4)
MCV RBC AUTO: 84 FL (ref 82–98)
MICROALBUMIN UR-MCNC: 64 MG/L (ref 0–20)
MICROALBUMIN/CREAT 24H UR: 79 MG/G CREATININE (ref 0–30)
MONOCYTES # BLD AUTO: 0.41 THOUSAND/ΜL (ref 0.17–1.22)
MONOCYTES NFR BLD AUTO: 7 % (ref 4–12)
NEUTROPHILS # BLD AUTO: 3.77 THOUSANDS/ΜL (ref 1.85–7.62)
NEUTS SEG NFR BLD AUTO: 62 % (ref 43–75)
NITRITE UR QL STRIP: POSITIVE
NON-SQ EPI CELLS URNS QL MICRO: ABNORMAL /HPF
NONHDLC SERPL-MCNC: 121 MG/DL
NRBC BLD AUTO-RTO: 0 /100 WBCS
PH UR STRIP.AUTO: 6 [PH]
PLATELET # BLD AUTO: 342 THOUSANDS/UL (ref 149–390)
PMV BLD AUTO: 10.8 FL (ref 8.9–12.7)
POTASSIUM SERPL-SCNC: 4.1 MMOL/L (ref 3.5–5.3)
PROT SERPL-MCNC: 8.1 G/DL (ref 6.4–8.2)
PROT UR STRIP-MCNC: ABNORMAL MG/DL
RBC # BLD AUTO: 4.88 MILLION/UL (ref 3.88–5.62)
RBC #/AREA URNS AUTO: ABNORMAL /HPF
SL AMB POCT HEMOGLOBIN AIC: 5.3 (ref ?–6.5)
SODIUM SERPL-SCNC: 138 MMOL/L (ref 136–145)
SP GR UR STRIP.AUTO: 1.02 (ref 1–1.03)
TRIGL SERPL-MCNC: 111 MG/DL
TSH SERPL DL<=0.05 MIU/L-ACNC: 1.02 UIU/ML (ref 0.36–3.74)
UROBILINOGEN UR QL STRIP.AUTO: 0.2 E.U./DL
WBC # BLD AUTO: 6.03 THOUSAND/UL (ref 4.31–10.16)
WBC #/AREA URNS AUTO: ABNORMAL /HPF

## 2019-05-06 PROCEDURE — 85025 COMPLETE CBC W/AUTO DIFF WBC: CPT | Performed by: FAMILY MEDICINE

## 2019-05-06 PROCEDURE — 87086 URINE CULTURE/COLONY COUNT: CPT | Performed by: FAMILY MEDICINE

## 2019-05-06 PROCEDURE — 84443 ASSAY THYROID STIM HORMONE: CPT | Performed by: FAMILY MEDICINE

## 2019-05-06 PROCEDURE — 80061 LIPID PANEL: CPT | Performed by: FAMILY MEDICINE

## 2019-05-06 PROCEDURE — 36415 COLL VENOUS BLD VENIPUNCTURE: CPT | Performed by: FAMILY MEDICINE

## 2019-05-06 PROCEDURE — 83036 HEMOGLOBIN GLYCOSYLATED A1C: CPT | Performed by: FAMILY MEDICINE

## 2019-05-06 PROCEDURE — 81001 URINALYSIS AUTO W/SCOPE: CPT | Performed by: FAMILY MEDICINE

## 2019-05-06 PROCEDURE — 80053 COMPREHEN METABOLIC PANEL: CPT | Performed by: FAMILY MEDICINE

## 2019-05-06 PROCEDURE — 82043 UR ALBUMIN QUANTITATIVE: CPT

## 2019-05-06 PROCEDURE — 82570 ASSAY OF URINE CREATININE: CPT

## 2019-05-06 PROCEDURE — 99213 OFFICE O/P EST LOW 20 MIN: CPT | Performed by: FAMILY MEDICINE

## 2019-05-06 PROCEDURE — 87077 CULTURE AEROBIC IDENTIFY: CPT | Performed by: FAMILY MEDICINE

## 2019-05-06 PROCEDURE — 87186 SC STD MICRODIL/AGAR DIL: CPT | Performed by: FAMILY MEDICINE

## 2019-05-07 DIAGNOSIS — T83.511A URINARY TRACT INFECTION ASSOCIATED WITH CATHETERIZATION OF URINARY TRACT, UNSPECIFIED INDWELLING URINARY CATHETER TYPE, INITIAL ENCOUNTER (HCC): Primary | ICD-10-CM

## 2019-05-07 DIAGNOSIS — N39.0 URINARY TRACT INFECTION ASSOCIATED WITH CATHETERIZATION OF URINARY TRACT, UNSPECIFIED INDWELLING URINARY CATHETER TYPE, INITIAL ENCOUNTER (HCC): Primary | ICD-10-CM

## 2019-05-07 RX ORDER — CIPROFLOXACIN 250 MG/1
250 TABLET, FILM COATED ORAL EVERY 12 HOURS SCHEDULED
Qty: 14 TABLET | Refills: 0 | Status: SHIPPED | OUTPATIENT
Start: 2019-05-07 | End: 2019-05-14

## 2019-05-08 ENCOUNTER — APPOINTMENT (OUTPATIENT)
Dept: WOUND CARE | Facility: HOSPITAL | Age: 59
End: 2019-05-08
Payer: MEDICARE

## 2019-05-08 LAB — BACTERIA UR CULT: ABNORMAL

## 2019-05-08 PROCEDURE — 11042 DBRDMT SUBQ TIS 1ST 20SQCM/<: CPT | Performed by: FAMILY MEDICINE

## 2019-05-09 DIAGNOSIS — N39.0 URINARY TRACT INFECTION ASSOCIATED WITH CATHETERIZATION OF URINARY TRACT, UNSPECIFIED INDWELLING URINARY CATHETER TYPE, INITIAL ENCOUNTER (HCC): Primary | ICD-10-CM

## 2019-05-09 DIAGNOSIS — T83.511A URINARY TRACT INFECTION ASSOCIATED WITH CATHETERIZATION OF URINARY TRACT, UNSPECIFIED INDWELLING URINARY CATHETER TYPE, INITIAL ENCOUNTER (HCC): Primary | ICD-10-CM

## 2019-05-09 RX ORDER — NITROFURANTOIN 25; 75 MG/1; MG/1
100 CAPSULE ORAL 2 TIMES DAILY
Qty: 10 CAPSULE | Refills: 0 | Status: SHIPPED | OUTPATIENT
Start: 2019-05-09 | End: 2019-05-14

## 2019-05-15 ENCOUNTER — TELEPHONE (OUTPATIENT)
Dept: UROLOGY | Facility: AMBULATORY SURGERY CENTER | Age: 59
End: 2019-05-15

## 2019-05-17 DIAGNOSIS — G82.20 PARAPLEGIC SPINAL PARALYSIS (HCC): ICD-10-CM

## 2019-05-17 DIAGNOSIS — G89.29 CHRONIC LOW BACK PAIN WITHOUT SCIATICA, UNSPECIFIED BACK PAIN LATERALITY: ICD-10-CM

## 2019-05-17 DIAGNOSIS — M54.50 CHRONIC LOW BACK PAIN WITHOUT SCIATICA, UNSPECIFIED BACK PAIN LATERALITY: ICD-10-CM

## 2019-05-17 RX ORDER — IBUPROFEN 800 MG/1
TABLET ORAL
Qty: 60 TABLET | Refills: 0 | Status: SHIPPED | OUTPATIENT
Start: 2019-05-17 | End: 2019-07-27 | Stop reason: SDUPTHER

## 2019-05-20 ENCOUNTER — TELEPHONE (OUTPATIENT)
Dept: FAMILY MEDICINE CLINIC | Facility: CLINIC | Age: 59
End: 2019-05-20

## 2019-05-22 DIAGNOSIS — G82.20 PARAPLEGIA (HCC): ICD-10-CM

## 2019-05-22 DIAGNOSIS — E11.9 TYPE 2 DIABETES MELLITUS WITHOUT COMPLICATION, WITHOUT LONG-TERM CURRENT USE OF INSULIN (HCC): ICD-10-CM

## 2019-05-22 DIAGNOSIS — L89.324 STAGE IV PRESSURE ULCER OF LEFT BUTTOCK (HCC): ICD-10-CM

## 2019-05-22 DIAGNOSIS — N31.9 NEUROGENIC DYSFUNCTION OF THE URINARY BLADDER: ICD-10-CM

## 2019-05-22 RX ORDER — MEDICAL SUPPLY, MISCELLANEOUS
EACH MISCELLANEOUS
Qty: 2 EACH | Refills: 11 | Status: SHIPPED | OUTPATIENT
Start: 2019-05-22 | End: 2020-02-10 | Stop reason: SDUPTHER

## 2019-05-23 RX ORDER — OXYCODONE HYDROCHLORIDE 20 MG/1
20 TABLET ORAL EVERY 8 HOURS PRN
Qty: 90 TABLET | Refills: 0 | Status: SHIPPED | OUTPATIENT
Start: 2019-05-23 | End: 2019-06-21 | Stop reason: SDUPTHER

## 2019-05-31 DIAGNOSIS — L89.324 STAGE IV PRESSURE ULCER OF LEFT BUTTOCK (HCC): ICD-10-CM

## 2019-05-31 DIAGNOSIS — N31.9 NEUROGENIC DYSFUNCTION OF THE URINARY BLADDER: ICD-10-CM

## 2019-05-31 DIAGNOSIS — G82.20 PARAPLEGIA (HCC): ICD-10-CM

## 2019-06-03 ENCOUNTER — OFFICE VISIT (OUTPATIENT)
Dept: UROLOGY | Facility: MEDICAL CENTER | Age: 59
End: 2019-06-03
Payer: MEDICARE

## 2019-06-03 VITALS
BODY MASS INDEX: 29.03 KG/M2 | DIASTOLIC BLOOD PRESSURE: 84 MMHG | WEIGHT: 185 LBS | HEIGHT: 67 IN | HEART RATE: 57 BPM | SYSTOLIC BLOOD PRESSURE: 128 MMHG

## 2019-06-03 DIAGNOSIS — N31.9 NEUROGENIC BLADDER: ICD-10-CM

## 2019-06-03 DIAGNOSIS — Z12.5 ENCOUNTER FOR PROSTATE CANCER SCREENING: ICD-10-CM

## 2019-06-03 DIAGNOSIS — N28.1 RENAL CYST: Primary | ICD-10-CM

## 2019-06-03 PROCEDURE — 99204 OFFICE O/P NEW MOD 45 MIN: CPT | Performed by: UROLOGY

## 2019-06-12 ENCOUNTER — APPOINTMENT (OUTPATIENT)
Dept: WOUND CARE | Facility: HOSPITAL | Age: 59
End: 2019-06-12
Payer: MEDICARE

## 2019-06-12 PROCEDURE — 11042 DBRDMT SUBQ TIS 1ST 20SQCM/<: CPT | Performed by: FAMILY MEDICINE

## 2019-06-21 DIAGNOSIS — G82.20 PARAPLEGIC SPINAL PARALYSIS (HCC): ICD-10-CM

## 2019-06-24 RX ORDER — OXYCODONE HYDROCHLORIDE 20 MG/1
20 TABLET ORAL EVERY 8 HOURS PRN
Qty: 90 TABLET | Refills: 0 | Status: SHIPPED | OUTPATIENT
Start: 2019-06-24 | End: 2019-07-23 | Stop reason: SDUPTHER

## 2019-07-10 ENCOUNTER — APPOINTMENT (OUTPATIENT)
Dept: WOUND CARE | Facility: HOSPITAL | Age: 59
End: 2019-07-10
Payer: MEDICARE

## 2019-07-10 PROCEDURE — 11042 DBRDMT SUBQ TIS 1ST 20SQCM/<: CPT | Performed by: FAMILY MEDICINE

## 2019-07-14 DIAGNOSIS — G89.29 CHRONIC LOW BACK PAIN WITHOUT SCIATICA, UNSPECIFIED BACK PAIN LATERALITY: ICD-10-CM

## 2019-07-14 DIAGNOSIS — M54.50 CHRONIC LOW BACK PAIN WITHOUT SCIATICA, UNSPECIFIED BACK PAIN LATERALITY: ICD-10-CM

## 2019-07-15 RX ORDER — IBUPROFEN 800 MG/1
TABLET ORAL
Qty: 60 TABLET | Refills: 0 | OUTPATIENT
Start: 2019-07-15

## 2019-07-16 ENCOUNTER — OFFICE VISIT (OUTPATIENT)
Dept: FAMILY MEDICINE CLINIC | Facility: CLINIC | Age: 59
End: 2019-07-16

## 2019-07-16 VITALS
OXYGEN SATURATION: 96 % | DIASTOLIC BLOOD PRESSURE: 62 MMHG | RESPIRATION RATE: 18 BRPM | TEMPERATURE: 98 F | SYSTOLIC BLOOD PRESSURE: 110 MMHG | HEART RATE: 77 BPM

## 2019-07-16 DIAGNOSIS — K21.9 GASTROESOPHAGEAL REFLUX DISEASE, ESOPHAGITIS PRESENCE NOT SPECIFIED: ICD-10-CM

## 2019-07-16 DIAGNOSIS — G82.20 PARAPLEGIC SPINAL PARALYSIS (HCC): Primary | ICD-10-CM

## 2019-07-16 DIAGNOSIS — E11.9 CONTROLLED TYPE 2 DIABETES MELLITUS WITHOUT COMPLICATION, WITHOUT LONG-TERM CURRENT USE OF INSULIN (HCC): ICD-10-CM

## 2019-07-16 PROCEDURE — 99213 OFFICE O/P EST LOW 20 MIN: CPT | Performed by: FAMILY MEDICINE

## 2019-07-16 NOTE — PROGRESS NOTES
Assessment/Plan:    I did not appreciate any mass on exam, discussed it could probably be dependent edema due to the way he sits on his chair and holds his arm  Patient will try to shift arm position during the day and observe if mass improves      BW ordered to be done prior to next visit   Problem List Items Addressed This Visit        Digestive    GERD (gastroesophageal reflux disease)       Endocrine    Diabetes mellitus type II, controlled ([facility] )     Lab Results   Component Value Date    HGBA1C 5 2 05/06/2019       No results for input(s): POCGLU in the last 72 hours  Blood Sugar Average: Last 72 hrs:  Continue low carb diet             Nervous and Auditory    Paraplegic spinal paralysis (HCC) - Primary            Subjective:      Patient ID: Oxana Luther is a 63 y o  male  64 yo  male with paraplegic spinal paralysis with R leg disarticulation at the hip, well controlled DM, HTN, with neurogenic bladder and recurrent E  Coli UTIs, here today for follow up  Patient is doing well, complains of a lump on his R underarm  Non painful, improves when he lies in bed  Sacral ulcer is followed by plastic surgeon and is improving  Patient seen and evaluated by Urology for renal cyst seen on CT scan  Patient is concerned about sludge in gall bladder, but denies RUQ pain, nause, change in appetite  The following portions of the patient's history were reviewed and updated as appropriate: He  has a past medical history of Anemia, Blind, Chronic cystitis, Colostomy in place [facility]), Detrusor sphincter dyssynergia, Diabetes mellitus ([facility] ), Erectile dysfunction, Frequency of urination, GERD (gastroesophageal reflux disease), History of diabetes mellitus, History of osteomyelitis, leg amputation ([facility] ), Hyperlipidemia, Hypertension, Hypospadias, Incomplete bladder emptying, Neurogenic bladder, Paralysis ([facility] ), Paraplegia ([facility] ), Spinal cord cysts, Ulcer of sacral region [facility]), and Urge incontinence    He Patient Active Problem List    Diagnosis Date Noted    Osteomyelitis of pelvic region [facility]) 05/06/2019    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    Colostomy in place [facility]) 04/23/2019    Colon cancer screening 04/23/2019    Dyspepsia 04/23/2019    Epigastric pain 04/23/2019    Opioid use 01/15/2019    Decubitus ulcer of ischium, left, stage IV ([facility] ) 11/12/2018    Diarrhea 09/21/2018    Amputated right leg ([facility] ) 07/18/2018    Anxiety 04/23/2018    GERD (gastroesophageal reflux disease)     History of osteomyelitis     History of diabetes mellitus     Cyst of joint of shoulder 10/20/2016    Anemia 10/20/2016    Paraplegic spinal paralysis ([facility] ) 10/20/2016    Sinus tachycardia 10/20/2016    E  coli UTI (urinary tract infection) 10/18/2016    Stage IV pressure ulcer of sacral region ([facility] ) 10/15/2016    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, stage 3 ([facility] ) 10/15/2016    Stage IV pressure ulcer of left heel ([facility] ) 10/15/2016    Diabetes mellitus type II, controlled ([facility] ) 03/07/2014    Benign essential hypertension 07/31/2013     He  has a past surgical history that includes Spine surgery; Leg amputation; Bladder surgery; Colostomy; Amputation; pr adj tiss xfer scalp,extrem 10 1-30 sqcm (Left, 5/1/2017); pr muscle-skin flap,trunk (Left, 5/1/2017); Colon surgery; pr muscle-skin flap,trunk (Left, 9/27/2017); Complex cystometrogram (2014); Uroflowmetry simple / complex (2014); Cystoscopy (2014); Penile prosthesis implant (2011); and Meatotomy  His family history includes No Known Problems in his father and mother  He  reports that he quit smoking about 32 years ago  He has a 5 00 pack-year smoking history  He has never used smokeless tobacco  He reports that he does not drink alcohol or use drugs    Current Outpatient Medications   Medication Sig Dispense Refill    ACCU-CHEK FASTCLIX LANCETS MISC by Other route 2 (two) times a day Test as directed 102 each 5    aspirin 81 MG tablet Take 81 mg by mouth daily      collagenase (SANTYL) ointment Apply 1 application topically daily      Disposable Gloves (LATEX GLOVES LARGE) MISC Use as needed up to 3 times a day dispo 2 boxes 2 each 11    glucose blood (ACCU-CHEK SMARTVIEW) test strip 1 each by Other route 2 (two) times a day Test 100 each 3    ibuprofen (MOTRIN) 800 mg tablet TAKE 1 TABLET(800 MG) BY MOUTH DAILY AS NEEDED FOR MODERATE PAIN 60 tablet 0    Incontinence Supply Disposable (SUNBEAM INCONTINENT UNDER PAD) MISC Use 1 per week, dispense 4 reusable underpads 4 each 11    Incontinence Supply Disposable MISC by Does not apply route 2 (two) times a day 60 each 11    Nutritional Supplements (GLUCERNA 1 0 JOHANN/CARBSTEADY) LIQD Take 1 Can by mouth 3 (three) times a day Dispense 90 cans per month 237 mL 11    omeprazole (PriLOSEC) 40 MG capsule TAKE 1 CAPSULE(40 MG) BY MOUTH DAILY 90 capsule 2    oxyCODONE (ROXICODONE) 20 MG TABS Take 1 tablet (20 mg total) by mouth every 8 (eight) hours as needed for moderate painMax Daily Amount: 60 mg 90 tablet 0    LORazepam (ATIVAN) 1 mg tablet Take 1 tablet (1 mg total) by mouth daily 30 minutes prior to MRI (Patient not taking: Reported on 5/6/2019) 1 tablet 0    SUPREP BOWEL PREP KIT 17 5-3 13-1 6 GM/177ML SOLN Take 1 kit by mouth once for 1 dose Please follow instructions as per the office  2 Bottle 0     No current facility-administered medications for this visit        Current Outpatient Medications on File Prior to Visit   Medication Sig    ACCU-CHEK FASTCLIX LANCETS MISC by Other route 2 (two) times a day Test as directed    aspirin 81 MG tablet Take 81 mg by mouth daily    collagenase (SANTYL) ointment Apply 1 application topically daily    Disposable Gloves (LATEX GLOVES LARGE) MISC Use as needed up to 3 times a day dispo 2 boxes    glucose blood (ACCU-CHEK SMARTVIEW) test strip 1 each by Other route 2 (two) times a day Test    ibuprofen (MOTRIN) 800 mg tablet TAKE 1 TABLET(800 MG) BY MOUTH DAILY AS NEEDED FOR MODERATE PAIN    Incontinence Supply Disposable (SUNBEAM INCONTINENT UNDER PAD) MISC Use 1 per week, dispense 4 reusable underpads    Incontinence Supply Disposable MISC by Does not apply route 2 (two) times a day    Nutritional Supplements (GLUCERNA 1 0 JOHANN/CARBSTEADY) LIQD Take 1 Can by mouth 3 (three) times a day Dispense 90 cans per month    omeprazole (PriLOSEC) 40 MG capsule TAKE 1 CAPSULE(40 MG) BY MOUTH DAILY    oxyCODONE (ROXICODONE) 20 MG TABS Take 1 tablet (20 mg total) by mouth every 8 (eight) hours as needed for moderate painMax Daily Amount: 60 mg    LORazepam (ATIVAN) 1 mg tablet Take 1 tablet (1 mg total) by mouth daily 30 minutes prior to MRI (Patient not taking: Reported on 5/6/2019)    SUPREP BOWEL PREP KIT 17 5-3 13-1 6 GM/177ML SOLN Take 1 kit by mouth once for 1 dose Please follow instructions as per the office  No current facility-administered medications on file prior to visit       Review of Systems   Musculoskeletal: Positive for arthralgias and back pain  All other systems reviewed and are negative  Objective:      /62 (BP Location: Left arm, Patient Position: Sitting, Cuff Size: Standard)   Pulse 77   Temp 98 °F (36 7 °C) (Temporal)   Resp 18   SpO2 96%          Physical Exam   Constitutional: He is oriented to person, place, and time  He appears well-developed  HENT:   Head: Normocephalic  Right Ear: External ear normal    Left Ear: External ear normal    Nose: Nose normal    Mouth/Throat: Oropharynx is clear and moist    Eyes: Pupils are equal, round, and reactive to light  Conjunctivae and EOM are normal    Neck: Normal range of motion  Neck supple  No thyromegaly present  Cardiovascular: Normal rate, regular rhythm and normal heart sounds  Pulmonary/Chest: Effort normal and breath sounds normal    Abdominal: Soft  There is no tenderness  There is no rebound and no guarding     Neurological: He is alert and oriented to person, place, and time  He has normal reflexes  Skin: Skin is dry  Psychiatric: He has a normal mood and affect  Nursing note and vitals reviewed

## 2019-07-16 NOTE — ASSESSMENT & PLAN NOTE
Lab Results   Component Value Date    HGBA1C 5 2 05/06/2019       No results for input(s): POCGLU in the last 72 hours      Blood Sugar Average: Last 72 hrs:  Continue low carb diet

## 2019-07-23 ENCOUNTER — TELEPHONE (OUTPATIENT)
Dept: FAMILY MEDICINE CLINIC | Facility: CLINIC | Age: 59
End: 2019-07-23

## 2019-07-23 DIAGNOSIS — G82.20 PARAPLEGIC SPINAL PARALYSIS (HCC): ICD-10-CM

## 2019-07-24 RX ORDER — OXYCODONE HYDROCHLORIDE 20 MG/1
20 TABLET ORAL EVERY 8 HOURS PRN
Qty: 90 TABLET | Refills: 0 | Status: SHIPPED | OUTPATIENT
Start: 2019-07-24 | End: 2019-09-03 | Stop reason: SDUPTHER

## 2019-07-25 DIAGNOSIS — E11.9 TYPE 2 DIABETES MELLITUS WITHOUT COMPLICATION, WITHOUT LONG-TERM CURRENT USE OF INSULIN (HCC): ICD-10-CM

## 2019-07-25 RX ORDER — BLOOD SUGAR DIAGNOSTIC
STRIP MISCELLANEOUS
Qty: 100 EACH | Refills: 3 | Status: SHIPPED | OUTPATIENT
Start: 2019-07-25 | End: 2019-12-27 | Stop reason: SDUPTHER

## 2019-07-27 DIAGNOSIS — M54.50 CHRONIC LOW BACK PAIN WITHOUT SCIATICA, UNSPECIFIED BACK PAIN LATERALITY: ICD-10-CM

## 2019-07-27 DIAGNOSIS — G89.29 CHRONIC LOW BACK PAIN WITHOUT SCIATICA, UNSPECIFIED BACK PAIN LATERALITY: ICD-10-CM

## 2019-07-29 RX ORDER — IBUPROFEN 800 MG/1
TABLET ORAL
Qty: 60 TABLET | Refills: 0 | Status: SHIPPED | OUTPATIENT
Start: 2019-07-29 | End: 2019-09-25 | Stop reason: HOSPADM

## 2019-07-29 RX ORDER — ASPIRIN 81 MG
TABLET, DELAYED RELEASE (ENTERIC COATED) ORAL
Qty: 30 TABLET | Refills: 0 | Status: SHIPPED | OUTPATIENT
Start: 2019-07-29 | End: 2019-10-04 | Stop reason: SDUPTHER

## 2019-08-07 ENCOUNTER — APPOINTMENT (OUTPATIENT)
Dept: WOUND CARE | Facility: HOSPITAL | Age: 59
End: 2019-08-07
Payer: MEDICARE

## 2019-08-07 PROCEDURE — 11042 DBRDMT SUBQ TIS 1ST 20SQCM/<: CPT | Performed by: FAMILY MEDICINE

## 2019-08-13 DIAGNOSIS — K21.9 GASTROESOPHAGEAL REFLUX DISEASE WITHOUT ESOPHAGITIS: ICD-10-CM

## 2019-08-13 RX ORDER — OMEPRAZOLE 20 MG/1
CAPSULE, DELAYED RELEASE ORAL
Qty: 90 CAPSULE | Refills: 0 | OUTPATIENT
Start: 2019-08-13

## 2019-08-21 ENCOUNTER — APPOINTMENT (OUTPATIENT)
Dept: LAB | Facility: CLINIC | Age: 59
End: 2019-08-21
Payer: MEDICARE

## 2019-08-21 ENCOUNTER — TELEPHONE (OUTPATIENT)
Dept: FAMILY MEDICINE CLINIC | Facility: CLINIC | Age: 59
End: 2019-08-21

## 2019-08-21 ENCOUNTER — TRANSCRIBE ORDERS (OUTPATIENT)
Dept: LAB | Facility: CLINIC | Age: 59
End: 2019-08-21

## 2019-08-21 DIAGNOSIS — T83.511A URINARY TRACT INFECTION ASSOCIATED WITH CATHETERIZATION OF URINARY TRACT, UNSPECIFIED INDWELLING URINARY CATHETER TYPE, INITIAL ENCOUNTER (HCC): Primary | ICD-10-CM

## 2019-08-21 DIAGNOSIS — N39.0 URINARY TRACT INFECTION ASSOCIATED WITH CATHETERIZATION OF URINARY TRACT, UNSPECIFIED INDWELLING URINARY CATHETER TYPE, INITIAL ENCOUNTER (HCC): Primary | ICD-10-CM

## 2019-08-21 LAB
BACTERIA UR QL AUTO: ABNORMAL /HPF
BILIRUB UR QL STRIP: ABNORMAL
CLARITY UR: ABNORMAL
COLOR UR: ABNORMAL
GLUCOSE UR STRIP-MCNC: NEGATIVE MG/DL
HGB UR QL STRIP.AUTO: NEGATIVE
KETONES UR STRIP-MCNC: NEGATIVE MG/DL
LEUKOCYTE ESTERASE UR QL STRIP: NEGATIVE
MUCOUS THREADS UR QL AUTO: ABNORMAL
NITRITE UR QL STRIP: NEGATIVE
NON-SQ EPI CELLS URNS QL MICRO: ABNORMAL /HPF
PH UR STRIP.AUTO: 6 [PH]
PROT UR STRIP-MCNC: ABNORMAL MG/DL
RBC #/AREA URNS AUTO: ABNORMAL /HPF
SP GR UR STRIP.AUTO: 1.02 (ref 1–1.03)
UROBILINOGEN UR QL STRIP.AUTO: 1 E.U./DL
WBC #/AREA URNS AUTO: ABNORMAL /HPF

## 2019-08-21 PROCEDURE — 81001 URINALYSIS AUTO W/SCOPE: CPT | Performed by: FAMILY MEDICINE

## 2019-08-21 NOTE — TELEPHONE ENCOUNTER
Patient called stating that if someone can please call him once the results are in from his urine because he is experiencing fevers  Please advice patient

## 2019-08-21 NOTE — TELEPHONE ENCOUNTER
The results from the urine are NOT reported, and will not be reported till much later today or tomorrow   Outpatient labs are not as fast as hospital?ED labs which are done and reported within a few hours  If he is having fevers should go to ED

## 2019-08-23 ENCOUNTER — APPOINTMENT (EMERGENCY)
Dept: CT IMAGING | Facility: HOSPITAL | Age: 59
DRG: 871 | End: 2019-08-23
Payer: MEDICARE

## 2019-08-23 ENCOUNTER — TELEPHONE (OUTPATIENT)
Dept: FAMILY MEDICINE CLINIC | Facility: CLINIC | Age: 59
End: 2019-08-23

## 2019-08-23 ENCOUNTER — HOSPITAL ENCOUNTER (INPATIENT)
Facility: HOSPITAL | Age: 59
LOS: 6 days | Discharge: HOME HEALTH | DRG: 871 | End: 2019-08-29
Attending: EMERGENCY MEDICINE | Admitting: HOSPITALIST
Payer: MEDICARE

## 2019-08-23 DIAGNOSIS — A41.9 SEPSIS DUE TO URINARY TRACT INFECTION (HCC): ICD-10-CM

## 2019-08-23 DIAGNOSIS — N49.3 FOURNIER'S GANGRENE IN MALE: ICD-10-CM

## 2019-08-23 DIAGNOSIS — N39.0 UTI (URINARY TRACT INFECTION): ICD-10-CM

## 2019-08-23 DIAGNOSIS — N31.9 NEUROGENIC BLADDER: Primary | ICD-10-CM

## 2019-08-23 DIAGNOSIS — S31.000A WOUND OF SACRAL REGION, INITIAL ENCOUNTER: ICD-10-CM

## 2019-08-23 DIAGNOSIS — L89.154 STAGE IV PRESSURE ULCER OF SACRAL REGION (HCC): Chronic | ICD-10-CM

## 2019-08-23 DIAGNOSIS — N39.0 COMPLICATED URINARY TRACT INFECTION: ICD-10-CM

## 2019-08-23 DIAGNOSIS — N39.0 SEPSIS DUE TO URINARY TRACT INFECTION (HCC): ICD-10-CM

## 2019-08-23 PROBLEM — I95.9 SEPSIS WITH HYPOTENSION (HCC): Status: ACTIVE | Noted: 2019-08-23

## 2019-08-23 LAB
ALBUMIN SERPL BCP-MCNC: 2.2 G/DL (ref 3.5–5)
ALP SERPL-CCNC: 166 U/L (ref 46–116)
ALT SERPL W P-5'-P-CCNC: 20 U/L (ref 12–78)
ANION GAP SERPL CALCULATED.3IONS-SCNC: 12 MMOL/L (ref 4–13)
ANION GAP SERPL CALCULATED.3IONS-SCNC: 12 MMOL/L (ref 4–13)
ANISOCYTOSIS BLD QL SMEAR: PRESENT
APTT PPP: 42 SECONDS (ref 23–37)
AST SERPL W P-5'-P-CCNC: 32 U/L (ref 5–45)
BACTERIA UR QL AUTO: ABNORMAL /HPF
BASOPHILS # BLD MANUAL: 0 THOUSAND/UL (ref 0–0.1)
BASOPHILS NFR MAR MANUAL: 0 % (ref 0–1)
BILIRUB SERPL-MCNC: 0.75 MG/DL (ref 0.2–1)
BILIRUB UR QL STRIP: ABNORMAL
BUN SERPL-MCNC: 6 MG/DL (ref 5–25)
BUN SERPL-MCNC: 8 MG/DL (ref 5–25)
CALCIUM SERPL-MCNC: 8 MG/DL (ref 8.3–10.1)
CALCIUM SERPL-MCNC: 8.6 MG/DL (ref 8.3–10.1)
CHLORIDE SERPL-SCNC: 104 MMOL/L (ref 100–108)
CHLORIDE SERPL-SCNC: 94 MMOL/L (ref 100–108)
CLARITY UR: ABNORMAL
CO2 SERPL-SCNC: 22 MMOL/L (ref 21–32)
CO2 SERPL-SCNC: 24 MMOL/L (ref 21–32)
COLOR UR: YELLOW
CREAT SERPL-MCNC: 0.56 MG/DL (ref 0.6–1.3)
CREAT SERPL-MCNC: 0.7 MG/DL (ref 0.6–1.3)
EOSINOPHIL # BLD MANUAL: 0 THOUSAND/UL (ref 0–0.4)
EOSINOPHIL NFR BLD MANUAL: 0 % (ref 0–6)
ERYTHROCYTE [DISTWIDTH] IN BLOOD BY AUTOMATED COUNT: 17.2 % (ref 11.6–15.1)
GFR SERPL CREATININE-BSD FRML MDRD: 104 ML/MIN/1.73SQ M
GFR SERPL CREATININE-BSD FRML MDRD: 114 ML/MIN/1.73SQ M
GLUCOSE SERPL-MCNC: 67 MG/DL (ref 65–140)
GLUCOSE SERPL-MCNC: 79 MG/DL (ref 65–140)
GLUCOSE UR STRIP-MCNC: NEGATIVE MG/DL
HCT VFR BLD AUTO: 33.6 % (ref 36.5–49.3)
HGB BLD-MCNC: 10.1 G/DL (ref 12–17)
HGB UR QL STRIP.AUTO: ABNORMAL
INR PPP: 1.22 (ref 0.84–1.19)
KETONES UR STRIP-MCNC: ABNORMAL MG/DL
LACTATE SERPL-SCNC: 1.3 MMOL/L (ref 0.5–2)
LACTATE SERPL-SCNC: 2.4 MMOL/L (ref 0.5–2)
LEUKOCYTE ESTERASE UR QL STRIP: ABNORMAL
LYMPHOCYTES # BLD AUTO: 0.37 THOUSAND/UL (ref 0.6–4.47)
LYMPHOCYTES # BLD AUTO: 6 % (ref 14–44)
MCH RBC QN AUTO: 24.5 PG (ref 26.8–34.3)
MCHC RBC AUTO-ENTMCNC: 30.1 G/DL (ref 31.4–37.4)
MCV RBC AUTO: 81 FL (ref 82–98)
MONOCYTES # BLD AUTO: 0.06 THOUSAND/UL (ref 0–1.22)
MONOCYTES NFR BLD: 1 % (ref 4–12)
NEUTROPHILS # BLD MANUAL: 5.75 THOUSAND/UL (ref 1.85–7.62)
NEUTS BAND NFR BLD MANUAL: 6 % (ref 0–8)
NEUTS SEG NFR BLD AUTO: 87 % (ref 43–75)
NITRITE UR QL STRIP: POSITIVE
NON-SQ EPI CELLS URNS QL MICRO: ABNORMAL /HPF
NRBC BLD AUTO-RTO: 0 /100 WBCS
PH UR STRIP.AUTO: 6 [PH]
PLATELET # BLD AUTO: 283 THOUSANDS/UL (ref 149–390)
PLATELET BLD QL SMEAR: ADEQUATE
PMV BLD AUTO: 9.5 FL (ref 8.9–12.7)
POTASSIUM SERPL-SCNC: 3 MMOL/L (ref 3.5–5.3)
POTASSIUM SERPL-SCNC: 3.1 MMOL/L (ref 3.5–5.3)
PROT SERPL-MCNC: 8.1 G/DL (ref 6.4–8.2)
PROT UR STRIP-MCNC: ABNORMAL MG/DL
PROTHROMBIN TIME: 15.6 SECONDS (ref 11.6–14.5)
RBC # BLD AUTO: 4.13 MILLION/UL (ref 3.88–5.62)
RBC #/AREA URNS AUTO: ABNORMAL /HPF
RBC MORPH BLD: PRESENT
SODIUM SERPL-SCNC: 130 MMOL/L (ref 136–145)
SODIUM SERPL-SCNC: 138 MMOL/L (ref 136–145)
SP GR UR STRIP.AUTO: 1.01 (ref 1–1.03)
TOTAL CELLS COUNTED SPEC: 100
UROBILINOGEN UR QL STRIP.AUTO: 1 E.U./DL
WBC # BLD AUTO: 6.18 THOUSAND/UL (ref 4.31–10.16)
WBC #/AREA URNS AUTO: ABNORMAL /HPF

## 2019-08-23 PROCEDURE — 87186 SC STD MICRODIL/AGAR DIL: CPT | Performed by: EMERGENCY MEDICINE

## 2019-08-23 PROCEDURE — 80048 BASIC METABOLIC PNL TOTAL CA: CPT | Performed by: PHYSICIAN ASSISTANT

## 2019-08-23 PROCEDURE — 51703 INSERT BLADDER CATH COMPLEX: CPT | Performed by: PHYSICIAN ASSISTANT

## 2019-08-23 PROCEDURE — 85610 PROTHROMBIN TIME: CPT | Performed by: EMERGENCY MEDICINE

## 2019-08-23 PROCEDURE — 87077 CULTURE AEROBIC IDENTIFY: CPT | Performed by: EMERGENCY MEDICINE

## 2019-08-23 PROCEDURE — 85007 BL SMEAR W/DIFF WBC COUNT: CPT | Performed by: EMERGENCY MEDICINE

## 2019-08-23 PROCEDURE — 99222 1ST HOSP IP/OBS MODERATE 55: CPT | Performed by: UROLOGY

## 2019-08-23 PROCEDURE — 87040 BLOOD CULTURE FOR BACTERIA: CPT | Performed by: EMERGENCY MEDICINE

## 2019-08-23 PROCEDURE — 85027 COMPLETE CBC AUTOMATED: CPT | Performed by: EMERGENCY MEDICINE

## 2019-08-23 PROCEDURE — 80053 COMPREHEN METABOLIC PANEL: CPT | Performed by: EMERGENCY MEDICINE

## 2019-08-23 PROCEDURE — 99285 EMERGENCY DEPT VISIT HI MDM: CPT | Performed by: PHYSICIAN ASSISTANT

## 2019-08-23 PROCEDURE — 96365 THER/PROPH/DIAG IV INF INIT: CPT

## 2019-08-23 PROCEDURE — 99285 EMERGENCY DEPT VISIT HI MDM: CPT

## 2019-08-23 PROCEDURE — 94762 N-INVAS EAR/PLS OXIMTRY CONT: CPT

## 2019-08-23 PROCEDURE — 36415 COLL VENOUS BLD VENIPUNCTURE: CPT

## 2019-08-23 PROCEDURE — 83605 ASSAY OF LACTIC ACID: CPT | Performed by: EMERGENCY MEDICINE

## 2019-08-23 PROCEDURE — 74177 CT ABD & PELVIS W/CONTRAST: CPT

## 2019-08-23 PROCEDURE — 81001 URINALYSIS AUTO W/SCOPE: CPT | Performed by: EMERGENCY MEDICINE

## 2019-08-23 PROCEDURE — 99223 1ST HOSP IP/OBS HIGH 75: CPT | Performed by: INTERNAL MEDICINE

## 2019-08-23 PROCEDURE — 85730 THROMBOPLASTIN TIME PARTIAL: CPT | Performed by: EMERGENCY MEDICINE

## 2019-08-23 PROCEDURE — NC001 PR NO CHARGE: Performed by: PHYSICIAN ASSISTANT

## 2019-08-23 PROCEDURE — 87086 URINE CULTURE/COLONY COUNT: CPT | Performed by: EMERGENCY MEDICINE

## 2019-08-23 PROCEDURE — 96361 HYDRATE IV INFUSION ADD-ON: CPT

## 2019-08-23 PROCEDURE — 0T9B70Z DRAINAGE OF BLADDER WITH DRAINAGE DEVICE, VIA NATURAL OR ARTIFICIAL OPENING: ICD-10-PCS | Performed by: UROLOGY

## 2019-08-23 RX ORDER — PANTOPRAZOLE SODIUM 40 MG/1
40 INJECTION, POWDER, FOR SOLUTION INTRAVENOUS
Status: DISCONTINUED | OUTPATIENT
Start: 2019-08-24 | End: 2019-08-25

## 2019-08-23 RX ORDER — POTASSIUM CHLORIDE AND SODIUM CHLORIDE 900; 300 MG/100ML; MG/100ML
75 INJECTION, SOLUTION INTRAVENOUS CONTINUOUS
Status: DISCONTINUED | OUTPATIENT
Start: 2019-08-23 | End: 2019-08-25

## 2019-08-23 RX ORDER — POTASSIUM CHLORIDE 14.9 MG/ML
20 INJECTION INTRAVENOUS ONCE
Status: DISCONTINUED | OUTPATIENT
Start: 2019-08-24 | End: 2019-08-23

## 2019-08-23 RX ORDER — ACETAMINOPHEN 325 MG/1
650 TABLET ORAL ONCE
Status: COMPLETED | OUTPATIENT
Start: 2019-08-23 | End: 2019-08-23

## 2019-08-23 RX ORDER — OXYCODONE HYDROCHLORIDE 10 MG/1
20 TABLET ORAL EVERY 8 HOURS PRN
Status: DISCONTINUED | OUTPATIENT
Start: 2019-08-23 | End: 2019-08-29 | Stop reason: HOSPADM

## 2019-08-23 RX ORDER — SODIUM CHLORIDE 9 MG/ML
75 INJECTION, SOLUTION INTRAVENOUS CONTINUOUS
Status: DISCONTINUED | OUTPATIENT
Start: 2019-08-23 | End: 2019-08-23

## 2019-08-23 RX ORDER — POTASSIUM CHLORIDE 20 MEQ/1
20 TABLET, EXTENDED RELEASE ORAL ONCE
Status: COMPLETED | OUTPATIENT
Start: 2019-08-23 | End: 2019-08-23

## 2019-08-23 RX ORDER — POTASSIUM CHLORIDE 14.9 MG/ML
20 INJECTION INTRAVENOUS ONCE
Status: DISCONTINUED | OUTPATIENT
Start: 2019-08-23 | End: 2019-08-23

## 2019-08-23 RX ADMIN — POTASSIUM CHLORIDE AND SODIUM CHLORIDE 75 ML/HR: 900; 300 INJECTION, SOLUTION INTRAVENOUS at 23:16

## 2019-08-23 RX ADMIN — ACETAMINOPHEN 650 MG: 325 TABLET ORAL at 16:35

## 2019-08-23 RX ADMIN — PIPERACILLIN SODIUM AND TAZOBACTAM SODIUM 3.38 G: 36; 4.5 INJECTION, POWDER, FOR SOLUTION INTRAVENOUS at 19:15

## 2019-08-23 RX ADMIN — IOHEXOL 100 ML: 350 INJECTION, SOLUTION INTRAVENOUS at 16:12

## 2019-08-23 RX ADMIN — SODIUM CHLORIDE 1000 ML: 0.9 INJECTION, SOLUTION INTRAVENOUS at 15:06

## 2019-08-23 RX ADMIN — SODIUM CHLORIDE 1000 ML: 0.9 INJECTION, SOLUTION INTRAVENOUS at 17:04

## 2019-08-23 RX ADMIN — VANCOMYCIN HYDROCHLORIDE 1250 MG: 5 INJECTION, POWDER, LYOPHILIZED, FOR SOLUTION INTRAVENOUS at 20:57

## 2019-08-23 RX ADMIN — CEFTRIAXONE SODIUM 2000 MG: 2 INJECTION, POWDER, FOR SOLUTION INTRAMUSCULAR; INTRAVENOUS at 13:47

## 2019-08-23 RX ADMIN — PIPERACILLIN SODIUM AND TAZOBACTAM SODIUM 3.38 G: 36; 4.5 INJECTION, POWDER, FOR SOLUTION INTRAVENOUS at 23:14

## 2019-08-23 RX ADMIN — POTASSIUM CHLORIDE 20 MEQ: 1500 TABLET, EXTENDED RELEASE ORAL at 22:32

## 2019-08-23 RX ADMIN — SODIUM CHLORIDE 1000 ML: 0.9 INJECTION, SOLUTION INTRAVENOUS at 13:00

## 2019-08-23 NOTE — ED PROVIDER NOTES
History  Chief Complaint   Patient presents with    Fever - 9 weeks to 74 years     states with fever x2 days highes 103 last medicated self with 1000 mg of tylenol at 11 this am  Pt straight caths at home last cathed at 8 pm states this am was getting resistance unabel to cath self  Patient presents to the emergency department fevers for the past 2 days of 102 at home  This morning he was unable to self cath himself  He has a neurogenic bladder and he always self catheterization  History of having UTIs  He states yesterday his urine seemed fine  He also has a sacral ulcer and the visiting nurse changed the dressing for him this morning before he came in  He has no chest pain or difficulty breathing no abdominal pain or GI upset  Patient has a colostomy states the stool coming from it has been normal   Patient denies any new rashes  However is later noted patient had a is a large sacral wound to the left buttocks wound was evaluated patient had it re-dressed this morning by visiting nurse  He states he has had it for years  - no pain, no CP or SOB, no abdominal pain or testicular pain  Prior to Admission Medications   Prescriptions Last Dose Informant Patient Reported? Taking?    ACCU-CHEK FASTCLIX LANCETS MISC  Self No No   Sig: by Other route 2 (two) times a day Test as directed   ACCU-CHEK SMARTVIEW test strip   No No   Sig: TEST TWICE DAILY   ASPIRIN LOW DOSE 81 MG EC tablet   No No   Sig: TAKE 1 TABLET BY MOUTH EVERY DAY   Disposable Gloves (LATEX GLOVES LARGE) MISC  Self No No   Sig: Use as needed up to 3 times a day dispo 2 boxes   Incontinence Supply Disposable (SUNBEAM INCONTINENT UNDER PAD) MISC  Self No No   Sig: Use 1 per week, dispense 4 reusable underpads   Incontinence Supply Disposable MISC  Self No No   Sig: by Does not apply route 2 (two) times a day   LORazepam (ATIVAN) 1 mg tablet  Self No No   Sig: Take 1 tablet (1 mg total) by mouth daily 30 minutes prior to MRI Patient not taking: Reported on 5/6/2019   Nutritional Supplements (GLUCERNA 1 0 JOHANN/CARBSTEADY) LIQD  Self No No   Sig: Take 1 Can by mouth 3 (three) times a day Dispense 90 cans per month   SUPREP BOWEL PREP KIT 17 5-3 13-1 6 GM/177ML SOLN   No No   Sig: Take 1 kit by mouth once for 1 dose Please follow instructions as per the office     collagenase (SANTYL) ointment  Self Yes No   Sig: Apply 1 application topically daily   ibuprofen (MOTRIN) 800 mg tablet   No No   Sig: TAKE 1 TABLET(800 MG) BY MOUTH DAILY AS NEEDED FOR MODERATE PAIN   omeprazole (PriLOSEC) 40 MG capsule  Self No No   Sig: TAKE 1 CAPSULE(40 MG) BY MOUTH DAILY   oxyCODONE (ROXICODONE) 20 MG TABS   No No   Sig: Take 1 tablet (20 mg total) by mouth every 8 (eight) hours as needed for moderate painMax Daily Amount: 60 mg      Facility-Administered Medications: None       Past Medical History:   Diagnosis Date    Anemia     Blind     r eye    Chronic cystitis     Colostomy in place [facility])     Detrusor sphincter dyssynergia     Diabetes mellitus ([facility] [address] )     Poorly controlled type 2; Last Assessed:  3/18/14    Erectile dysfunction     Frequency of urination     GERD (gastroesophageal reflux disease)     History of diabetes mellitus     History of osteomyelitis     Hx of leg amputation (HCC)     r high upper leg    Hyperlipidemia     Hypertension     Hypospadias     Incomplete bladder emptying     Neurogenic bladder     Paralysis (HCC)     Paraplegia (HCC)     Spinal cord cysts     Ulcer of sacral region ([address] )     Urge incontinence        Past Surgical History:   Procedure Laterality Date    AMPUTATION      At hip; Last Assessed:  1/19/16    BLADDER SURGERY      COLON SURGERY      llq ostomy pouch    COLOSTOMY      COMPLEX CYSTOMETROGRAM  2014    CYSTOSCOPY  2014    LEG AMPUTATION      MEATOTOMY      PENILE PROSTHESIS IMPLANT  2011    NJ ADJ TISS XFER SCALP,EXTREM 10 1-30 SQCM Left 5/1/2017    Procedure: POSTERIOR THIGH V-Y ADMANCEMENT;  Surgeon: Merlinda Lieu, MD;  [facility];  Service: Plastics    IN MUSCLE-SKIN FLAP,TRUNK Left 2017    Procedure: FLAP CLOSURE LEFT ISCHIAL WOUND and "RIGHT" ISCHIAL FLAP ADVANCEMENT * DEBRIDEMENT, VAC PLACEMENT ;  Surgeon: Merlinda Lieu, MD;  [facility];  Service: Plastics    IN MUSCLE-SKIN FLAP,TRUNK Left 2017    Procedure: gluteal myocutaneous rotational flap, posterior thigh v to y advancement- wound 5 x 2 5 x 8;  Surgeon: Merlinda Lieu, MD;  [facility];  Service: Plastics    SPINE SURGERY      Lower back    UROFLOWMETRY SIMPLE / COMPLEX         Family History   Problem Relation Age of Onset    No Known Problems Mother     No Known Problems Father      I have reviewed and agree with the history as documented  Social History     Tobacco Use    Smoking status: Former Smoker     Packs/day: 0 50     Years: 10 00     Pack years: 5 00     Last attempt to quit:      Years since quittin 6    Smokeless tobacco: Never Used    Tobacco comment: Onset date:  11/10/17   Substance Use Topics    Alcohol use: Never     Frequency: Never     Comment: Per [employer]:  Social drinker (Onset date:  11/10/17)    Drug use: No        Review of Systems   All other systems reviewed and are negative  Physical Exam  Physical Exam   Constitutional: He is oriented to person, place, and time  He appears well-developed and well-nourished  HENT:   Head: Normocephalic and atraumatic  Right Ear: Tympanic membrane, external ear and ear canal normal    Left Ear: Tympanic membrane, external ear and ear canal normal    Mouth/Throat: Oropharynx is clear and moist    Eyes: Conjunctivae and EOM are normal    Neck: Normal range of motion  Neck supple  Cardiovascular: Normal rate, regular rhythm, normal heart sounds and intact distal pulses  Pulmonary/Chest: Effort normal and breath sounds normal    Abdominal: Soft   Bowel sounds are normal    Genitourinary: Right testis shows no swelling and no tenderness  Left testis shows no swelling and no tenderness  Genitourinary Comments: Patient has blood at the meatus from attempting to self cath  No cellulitic changs or tenderness to scrotum or penis  Musculoskeletal:   Amputee - right leg abscent  Multiple back surgerioes/deformity - chronic to back  Lymphadenopathy:     He has no cervical adenopathy  Neurological: He is alert and oriented to person, place, and time  He exhibits normal muscle tone  Coordination normal    Skin: Skin is warm  Left sacral ulceration - very large with packing in place - lots of foul smelling drainage coming from area  Psychiatric: He has a normal mood and affect  His behavior is normal    Nursing note and vitals reviewed        Vital Signs  ED Triage Vitals   Temperature Pulse Respirations Blood Pressure SpO2   08/23/19 1152 08/23/19 1152 08/23/19 1152 08/23/19 1152 08/23/19 1152   100 2 °F (37 9 °C) (!) 121 18 104/58 99 %      Temp Source Heart Rate Source Patient Position - Orthostatic VS BP Location FiO2 (%)   08/23/19 1152 08/23/19 1152 08/23/19 1152 08/23/19 1152 --   Oral Monitor Lying Left arm       Pain Score       08/23/19 1655       No Pain           Vitals:    08/24/19 2343 08/25/19 0330 08/25/19 0500 08/25/19 0744   BP: 101/56 112/69  124/57   Pulse: 72 65 65 88   Patient Position - Orthostatic VS: Lying Lying  Lying         Visual Acuity  Visual Acuity      Most Recent Value   L Pupil Size (mm)  3   L Pupil Shape  Round   R Pupil Shape  Other (Comment) [Cloudy]          ED Medications  Medications   oxyCODONE (ROXICODONE) immediate release tablet 20 mg (20 mg Oral Given 8/24/19 2022)   pantoprazole (PROTONIX) injection 40 mg (40 mg Intravenous Given 8/24/19 0821)   sodium chloride 0 9 % with KCl 40 mEq/L infusion (premix) (75 mL/hr Intravenous New Bag 8/25/19 0528)   enoxaparin (LOVENOX) subcutaneous injection 40 mg (40 mg Subcutaneous Given 8/24/19 1152)   cefTRIAXone (ROCEPHIN) 1,000 mg in dextrose 5 % 50 mL IVPB (1,000 mg Intravenous New Bag 8/24/19 1359)   vancomycin (VANCOCIN) oral solution 125 mg (125 mg Oral Given 8/24/19 2023)   acetaminophen (TYLENOL) tablet 650 mg (has no administration in time range)   morphine (PF) 4 mg/mL injection 4 mg (has no administration in time range)   sodium chloride 0 9 % bolus 1,000 mL (0 mL Intravenous Stopped 8/23/19 1658)     Followed by   sodium chloride 0 9 % bolus 1,000 mL (0 mL Intravenous Stopped 8/23/19 1658)     Followed by   sodium chloride 0 9 % bolus 1,000 mL (0 mL Intravenous Stopped 8/23/19 2310)   ceftriaxone (ROCEPHIN) 2 g/50 mL in dextrose IVPB (0 mg Intravenous Stopped 8/23/19 1439)   iohexol (OMNIPAQUE) 350 MG/ML injection (MULTI-DOSE) 100 mL (100 mL Intravenous Given 8/23/19 1612)   acetaminophen (TYLENOL) tablet 650 mg (650 mg Oral Given 8/23/19 1635)   potassium chloride (K-DUR,KLOR-CON) CR tablet 20 mEq (20 mEq Oral Given 8/23/19 2232)       Diagnostic Studies  Results Reviewed     Procedure Component Value Units Date/Time    Blood culture #1 [[id number]] Collected:  08/23/19 1226    Lab Status:  Preliminary result Specimen:  Blood from Arm, Left Updated:  08/24/19 1901     Blood Culture No Growth at 24 hrs  Blood culture #2 [[id number]] Collected:  08/23/19 1211    Lab Status:  Preliminary result Specimen:  Blood from Arm, Left Updated:  08/24/19 1901     Blood Culture No Growth at 24 hrs  Urine culture [[id number]] Collected:  08/23/19 1450    Lab Status:  Preliminary result Specimen:  Urine, Indwelling Solomon Catheter Updated:  08/24/19 1900     Urine Culture Culture results to follow      Urine Microscopic [[id number]]  (Abnormal) Collected:  08/23/19 1450    Lab Status:  Final result Specimen:  Urine, Indwelling Solomon Catheter Updated:  08/23/19 1547     RBC, UA       Field obscured, unable to enumerate     /hpf     WBC, UA       Field obscured, unable to enumerate     /hpf     Epithelial Cells       Vanessa Khan obscured, unable to enumerate     /hpf     Bacteria, UA       Field obscured, unable to enumerate     /hpf    UA w Reflex to Microscopic w Reflex to Culture [[id number]]  (Abnormal) Collected:  08/23/19 1450    Lab Status:  Final result Specimen:  Urine, Indwelling Solomon Catheter Updated:  08/23/19 1541     Color, UA Yellow     Clarity, UA Slightly Cloudy     Specific [place], UA 1 010     pH, UA 6 0     Leukocytes, UA Large     Nitrite, UA Positive     Protein, UA 30 (1+) mg/dl      Glucose, UA Negative mg/dl      Ketones, UA 15 (1+) mg/dl      Urobilinogen, UA 1 0 E U /dl      Bilirubin, UA Interference- unable to analyze     Blood, UA Large    Lactic acid x2 [[id number]]  (Normal) Collected:  08/23/19 1445    Lab Status:  Final result Specimen:  Blood from Arm, Left Updated:  08/23/19 1509     LACTIC ACID 1 3 mmol/L     Narrative:       Result may be elevated if tourniquet was used during collection  CBC and differential [[id number]]  (Abnormal) Collected:  08/23/19 1211    Lab Status:  Final result Specimen:  Blood from Arm, Left Updated:  08/23/19 1259     WBC 6 18 Thousand/uL      RBC 4 13 Million/uL      Hemoglobin 10 1 g/dL      Hematocrit 33 6 %      MCV 81 fL      MCH 24 5 pg      MCHC 30 1 g/dL      RDW 17 2 %      MPV 9 5 fL      Platelets 339 Thousands/uL      nRBC 0 /100 WBCs     Narrative: This is an appended report  These results have been appended to a previously verified report  Lactic acid x2 [[id number]]  (Abnormal) Collected:  08/23/19 1211    Lab Status:  Final result Specimen:  Blood from Arm, Left Updated:  08/23/19 1257     LACTIC ACID 2 4 mmol/L     Narrative:       Result may be elevated if tourniquet was used during collection      Comprehensive metabolic panel [[id number]]  (Abnormal) Collected:  08/23/19 1211    Lab Status:  Final result Specimen:  Blood from Arm, Left Updated:  08/23/19 1255     Sodium 130 mmol/L      Potassium 3 1 mmol/L      Chloride 94 mmol/L      CO2 24 mmol/L      ANION GAP 12 mmol/L      BUN 8 mg/dL      Creatinine 0 70 mg/dL      Glucose 79 mg/dL      Calcium 8 6 mg/dL      AST 32 U/L      ALT 20 U/L      Alkaline Phosphatase 166 U/L      Total Protein 8 1 g/dL      Albumin 2 2 g/dL      Total Bilirubin 0 75 mg/dL      eGFR 104 ml/min/1 73sq m     Narrative:       [place] guidelines for Chronic Kidney Disease (CKD):     Stage 1 with normal or high GFR (GFR > 90 mL/min/1 73 square meters)    Stage 2 Mild CKD (GFR = 60-89 mL/min/1 73 square meters)    Stage 3A Moderate CKD (GFR = 45-59 mL/min/1 73 square meters)    Stage 3B Moderate CKD (GFR = 30-44 mL/min/1 73 square meters)    Stage 4 Severe CKD (GFR = 15-29 mL/min/1 73 square meters)    Stage 5 End Stage CKD (GFR <15 mL/min/1 73 square meters)  Note: GFR calculation is accurate only with a steady state creatinine    Protime-INR [[id number]]  (Abnormal) Collected:  08/23/19 1211    Lab Status:  Final result Specimen:  Blood from Arm, Left Updated:  08/23/19 1243     Protime 15 6 seconds      INR 1 22    APTT [[id number]]  (Abnormal) Collected:  08/23/19 1211    Lab Status:  Final result Specimen:  Blood from Arm, Left Updated:  08/23/19 1243     PTT 42 seconds                  CT abdomen pelvis with contrast   Final Result by Luis Del Cid MD (08/23 1731)         1  Multiple foci of soft tissue gas in the penis, perineum and scrotal region with soft tissue gas extending through the left perirectal space to the upper pelvis to the level of the sigmoid colon  The findings are suspicious for 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  2   There is soft tissue thickening with several foci of air adjacent to the penile implant, suggestive of infection of the penile implant        3  Multiple sites of sacral and ischial decubitus ulcers with chronic osteomyelitis of the ischial, pubic bones, and sacrum, similar to the prior exam          4   Small volume perihepatic ascites  5   Moderate gallbladder distention  No calcified gallstones  I personally discussed this study with Dr Janessa Lewis  on 8/23/2019 at 5:29 PM                Workstation performed: [id number]                    Procedures  Procedures       ED Course  ED Course as of Aug 25 0823   Fri Aug 23, 2019   1342 Spoke w gaye pac w urology - coming to see pt        0 Spoke with ED from Critical care - agree w sepsis eval and tx  We are also going to further eval the sacral wound - appears very deep will CT      1553 Urology able to get dangelo cath                      Initial Sepsis Screening     [address] Name 08/23/19 1315                Is the patient's history suggestive of a new or worsening infection?         Suspected source of infection  urinary tract infection  -AH        Are two or more of the following signs & symptoms of infection both present and new to the patient?         Indicate SIRS criteria  Hyperthemia > 38 3C (100 9F)  -AH        If the answer is yes to both questions, suspicion of sepsis is present          If severe sepsis is present AND tissue hypoperfusion perists in the hour after fluid resuscitation or lactate > 4, the patient meets criteria for SEPTIC SHOCK          Are any of the following organ dysfunction criteria present within 6 hours of suspected infection and SIRS criteria that are NOT considered to be chronic conditions?         Organ dysfunction  Lactate > 2 0 mmol/L  -AH        Date of presentation of severe sepsis          Time of presentation of severe sepsis          Tissue hypoperfusion persists in the hour after crystalloid fluid administration, evidenced, by either:          Was hypotension present within one hour of the conclusion of crystalloid fluid administration?   No  -AH        Date of presentation of septic shock          Time of presentation of septic shock            User Key  (r) = Recorded By, (t) = Taken By, (c) = Cosigned By    [address] Name Provider Type     Stephanie Johnson PA-C Physician Assistant           Default Flowsheet Data (last 720 hours)      Sepsis Reassess     Row Name 08/23/19 1525                   Repeat Volume Status and Tissue Perfusion Assessment Performed    Repeat Volume Status and Tissue Perfusion Assessment Performed  Yes  -           Volume Status and Tissue Perfusion Post Fluid Resuscitation * Must Document All *    Vital Signs Reviewed (HR, RR, BP, T)  Yes  -AH        Shock Index Reviewed          Arterial Oxygen Saturation Reviewed (POx, SaO2 or SpO2)          Cardio  Regular rate and rhythm  -        Pulmonary  Normal effort  -AH        Capillary Refill  Brisk  -        Peripheral Pulses  Radial  -        Peripheral Pulse  +3  -AH        Skin  Warm  -AH        Urine output assessed  Adequate  -AH           *OR*   Intensive Monitoring- Must Document One of the Following Four *:    Vital Signs Reviewed          * Central Venous Pressure (CVP or RAP)          * Central Venous Oxygen (SVO2, ScvO2 or Oxygen saturation via central catheter)          * Bedside Cardiovascular US in IVC diameter and % collapse          * Passive Leg Raise OR Crystalloid Challenge            User Key  (r) = Recorded By, (t) = Taken By, (c) = Cosigned By    Initials Name Provider Type     Stephanie Johnson PAHarisC Physician Assistant                MDM  Number of Diagnoses or Management Options  Sepsis due to urinary tract infection ([facility] [address] ): new and requires workup  UTI (urinary tract infection): new and requires workup  Wound of sacral region, initial encounter: established and worsening     Amount and/or Complexity of Data Reviewed  Clinical lab tests: reviewed  Tests in the radiology section of CPT®: (Signed out awaiting CT )  Review and summarize past medical records: yes  Discuss the patient with other providers: yes Eugenia Valdez [facility] - Urology, Ed - Critical care    Signed out to Demetrius Vergara)    Risk of Complications, Morbidity, and/or Mortality  General comments: Admitted to SLIM   Signed out to DR Demetrius Vergara      Patient Progress  Patient progress: stable      Disposition  Final diagnoses:   UTI (urinary tract infection)   Sepsis due to urinary tract infection ([facility] [address] )   Wound of sacral region, initial encounter     Time reflects when diagnosis was documented in both MDM as applicable and the Disposition within this note     Time User Action Codes Description Comment    8/23/2019  3:03 PM Renita Sermons Add [N31 9] Neurogenic bladder     8/23/2019  3:03 PM Rayna Britney [N31 9] Neurogenic bladder     8/23/2019  4:12 PM Stephanie Mulligan Add [N39 0] UTI (urinary tract infection)     8/23/2019  4:13 PM Stephanie Mulligan Add [A41 9,  N39 0] Sepsis due to urinary tract infection ([facility] [address] )     8/23/2019  4:14 PM Stephanie Mulligan Add [S31 000A] Wound of sacral region, initial encounter     8/23/2019  5:57 PM Myrl Dural Add [G79 295] Stage IV pressure ulcer of sacral region ([facility] [address] )     8/23/2019  5:57 PM Myrl Dural Modify [H38 322] Stage IV pressure ulcer of sacral region ([facility] [address] )     8/23/2019 10:16 PM Krystian Quiles 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       ED Disposition     ED Disposition Condition Date/Time Comment    Admit Stable Fri Aug 23, 2019  4:11 PM Case was discussed with  Juanita Fritz  and the patient's admission status was agreed to be admission to the service of Dr Juanita Fritz           Follow-up Information    None         Current Discharge Medication List      CONTINUE these medications which have NOT CHANGED    Details   ACCU-CHEK FASTCLIX LANCETS MISC by Other route 2 (two) times a day Test as directed  Qty: 102 each, Refills: 5    Associated Diagnoses: Type 2 diabetes mellitus without complication, without long-term current use of insulin ([facility])      ACCU-CHEK SMARTVIEW test strip TEST TWICE DAILY  Qty: 100 each, Refills: 3    Associated Diagnoses: Type 2 diabetes mellitus without complication, without long-term current use of insulin ([facility])      ASPIRIN LOW DOSE 81 MG EC tablet TAKE 1 TABLET BY MOUTH EVERY DAY  Qty: 30 tablet, Refills: 0    Associated Diagnoses: Chronic low back pain without sciatica, unspecified back pain laterality      collagenase (SANTYL) ointment Apply 1 application topically daily      Disposable Gloves (LATEX GLOVES LARGE) MISC Use as needed up to 3 times a day dispo 2 boxes  Qty: 2 each, Refills: 11    Associated Diagnoses: Neurogenic dysfunction of the urinary bladder; Paraplegia ([address] ); Stage IV pressure ulcer of left buttock ([address] ); Type 2 diabetes mellitus without complication, without long-term current use of insulin ([facility])      ibuprofen (MOTRIN) 800 mg tablet TAKE 1 TABLET(800 MG) BY MOUTH DAILY AS NEEDED FOR MODERATE PAIN  Qty: 60 tablet, Refills: 0    Associated Diagnoses: Chronic low back pain without sciatica, unspecified back pain laterality      !! Incontinence Supply Disposable (SUNBEAM INCONTINENT UNDER PAD) MISC Use 1 per week, dispense 4 reusable underpads  Qty: 4 each, Refills: 11    Associated Diagnoses: Neurogenic dysfunction of the urinary bladder; Paraplegia ([address] ); Stage IV pressure ulcer of left buttock ([address] )      !! Incontinence Supply Disposable MISC by Does not apply route 2 (two) times a day  Qty: 60 each, Refills: 11    Associated Diagnoses: Neurogenic dysfunction of the urinary bladder; Paraplegia ([address] ); Stage IV pressure ulcer of left buttock (HCC)      LORazepam (ATIVAN) 1 mg tablet Take 1 tablet (1 mg total) by mouth daily 30 minutes prior to MRI  Qty: 1 tablet, Refills: 0    Comments: To be given 30 minutes prior to MRI  Associated Diagnoses: Anxiety      Nutritional Supplements (GLUCERNA 1 0 JOHANN/CARBSTEADY) LIQD Take 1 Can by mouth 3 (three) times a day Dispense 90 cans per month  Qty: 237 mL, Refills: 11    Associated Diagnoses: Neurogenic dysfunction of the urinary bladder; Paraplegia ([address] ); Stage IV pressure ulcer of left buttock ([address] );  Type 2 diabetes mellitus without complication, without long-term current use of insulin (HCC)      omeprazole (PriLOSEC) 40 MG capsule TAKE 1 CAPSULE(40 MG) BY MOUTH DAILY  Qty: 90 capsule, Refills: 2    Comments: **Patient requests 90 days supply**  Associated Diagnoses: Gastroesophageal reflux disease, esophagitis presence not specified      oxyCODONE (ROXICODONE) 20 MG TABS Take 1 tablet (20 mg total) by mouth every 8 (eight) hours as needed for moderate painMax Daily Amount: 60 mg  Qty: 90 tablet, Refills: 0    Comments: Continuation of therapy, do not fill before 3/22/2019  Associated Diagnoses: Paraplegic spinal paralysis ([facility])      SUPREP BOWEL PREP KIT 17 5-3 13-1 6 GM/177ML SOLN Take 1 kit by mouth once for 1 dose Please follow instructions as per the office  Qty: 2 Bottle, Refills: 0    Associated Diagnoses: Colon cancer screening       !! - Potential duplicate medications found  Please discuss with provider  No discharge procedures on file      ED Provider  Electronically Signed by           Radha Stanton PA-C  08/25/19 4521       Stephanie Mulligan PA-C  08/25/19 5677

## 2019-08-23 NOTE — ASSESSMENT & PLAN NOTE
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

## 2019-08-23 NOTE — H&P
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 versus large stage IV decubitus ulcer with chronic osteomyelitis  · Continue 3rd L of normal saline bolus  · Start Zosyn and vancomycin  · Discussed with Urology regarding critical findings on CT imaging  And they will come to evaluate the patient soon and will take him to the operating room  · Follow-up culture result  · Transfer to step-down unit  Discussed with critical care team   If he continues to deteriorate he will be transferred to critical care  · Consult Infectious Disease    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  Assessment & Plan  · Maintain NPO status  · Anticipate urgent surgery tonight by Urology  · Start empiric vancomycin and Zosyn    Opioid use  Assessment & Plan  · PD MP website reviewed  Medications confirmed  GERD (gastroesophageal reflux disease)  Assessment & Plan  · Switch omeprazole to IV Protonix while NPO  Stage IV pressure ulcer of sacral region [facility])  Assessment & Plan  · Present on admission  · With chronic osteomyelitis  · Await surgical intervention tonight  · Consult wound care  · If more debridement is required, he may need acute care surgery  VTE Prophylaxis:  The patient will be going urgently to the OR so will hold prophylaxis at this time   Code Status:  Full code  POLST: There is no POLST form on file for this patient (pre-hospital)  Discussion with family:  Tried to call wife and left a voicemail    Anticipated Length of Stay:  Patient will be admitted on an Inpatient basis with an anticipated length of stay of  At least 2 midnights     Justification for Hospital Stay: sepsis    Total Time for Visit, including Counseling / Coordination of Care: 45 minutes  Greater than 50% of this total time spent on direct patient counseling and coordination of care  Chief Complaint:   "could not pee"    History of Present Illness:    Colletta Bossier is a 63 y o  male who presents with fever and difficulty urinating  He has the known history of neurogenic bladder and paraplegia secondary to spinal cysts  He has a complicated urologic history which included placement of a penile implant many years ago which eroded into the corpora cavernosum  He also has a large stage IV sacral decubitus ulcer with chronic osteomyelitis  He usually self catheterizes at home 3 times daily  He came to the hospital today due to fever and difficulty urinating  He was unable to successfully catheterized himself  When he did it he clifford blood instead of urine  He also reported having fever of 101-102 at home  Due to his history of spinal injury he has baseline paralysis with deformities of the limbs including right lower extremity amputation  He is unable to walk and is bed-bound  He received 2 L of IV fluid in the ER and was started on empiric antibiotics  A CT of the abdomen was done prior to transfer to the medical floor  Upon arrival to this floor, CT of the abdomen and pelvis was read by Radiology which revealed deep soft tissue infection of the perineal area suspicious for Vega gangrene  At the time of my examination he had no pain or discomfort, however his blood pressure went down to 85/54 while going through his 3rd L of IV fluid  Review of Systems:    Review of Systems   Unable to perform ROS: Acuity of condition   Constitutional: Positive for chills and fever  Respiratory: Negative  Cardiovascular: Negative  Gastrointestinal: Negative  Genitourinary: Positive for difficulty urinating  All other systems reviewed and are negative        Past Medical and Surgical History:     Past Medical History: Diagnosis Date    Anemia     Blind     r eye    Chronic cystitis     Colostomy in place [facility])     Detrusor sphincter dyssynergia     Diabetes mellitus ([facility] )     Poorly controlled type 2; Last Assessed:  3/18/14    Erectile dysfunction     Frequency of urination     GERD (gastroesophageal reflux disease)     History of diabetes mellitus     History of osteomyelitis     Hx of leg amputation (HCC)     r high upper leg    Hyperlipidemia     Hypertension     Hypospadias     Incomplete bladder emptying     Neurogenic bladder     Paralysis (HCC)     [facility]aplegia ([facility])     Spinal cord cysts     Ulcer of sacral region ([facility] )     Urge incontinence        Past Surgical History:   Procedure Laterality Date    AMPUTATION      At hip; Last Assessed:  1/19/16    BLADDER SURGERY      COLON SURGERY      llq ostomy pouch    COLOSTOMY      COMPLEX CYSTOMETROGRAM  2014    CYSTOSCOPY  2014    LEG AMPUTATION      MEATOTOMY      PENILE PROSTHESIS IMPLANT  2011    SD ADJ TISS XFER SCALP,EXTREM 10 1-30 SQCM Left 5/1/2017    Procedure: POSTERIOR THIGH V-Y ADMANCEMENT;  Surgeon: Heather Mak MD;  [facility];  Service: Plastics    SD MUSCLE-SKIN FLAP,TRUNK Left 5/1/2017    Procedure: FLAP CLOSURE LEFT ISCHIAL WOUND and "RIGHT" ISCHIAL FLAP ADVANCEMENT * DEBRIDEMENT, VAC PLACEMENT ;  Surgeon: Heather Mak MD;  Location: [facility];  Service: Plastics    SD MUSCLE-SKIN FLAP,TRUNK Left 9/27/2017    Procedure: gluteal myocutaneous rotational flap, posterior thigh v to y advancement- wound 5 x 2 5 x 8;  Surgeon: Heather Mak MD;  Location: [facility];  Service: Plastics    SPINE SURGERY      Lower back    UROFLOWMETRY SIMPLE / COMPLEX  2014       Meds/Allergies:    Prior to Admission medications    Medication Sig Start Date End Date Taking?  Authorizing Provider   ACCU-CHEK FASTCLIX LANCETS MISC by Other route 2 (two) times a day Test as directed 1/18/19   Telly Williamson MD   ACCU-CHEK SMARTVIEW test strip TEST TWICE DAILY 7/25/19   Jesús German MD   ASPIRIN LOW DOSE 81 MG EC tablet TAKE 1 TABLET BY MOUTH EVERY DAY 7/29/19   Jesús German MD   collagenase (SANTYL) ointment Apply 1 application topically daily    Historical Provider, MD   Disposable Gloves (LATEX GLOVES LARGE) MISC Use as needed up to 3 times a day dispo 2 boxes 5/22/19   Jesús German MD   ibuprofen (MOTRIN) 800 mg tablet TAKE 1 TABLET(800 MG) BY MOUTH DAILY AS NEEDED FOR MODERATE PAIN 7/29/19   Jesús German MD   Incontinence Supply Disposable (SUNBEAM INCONTINENT UNDER PAD) MISC Use 1 per week, dispense 4 reusable underpads 5/31/19   Jesús German MD   Incontinence Supply Disposable MISC by Does not apply route 2 (two) times a day 5/22/19   Jesús German MD   LORazepam (ATIVAN) 1 mg tablet Take 1 tablet (1 mg total) by mouth daily 30 minutes prior to MRI  Patient not taking: Reported on 5/6/2019 6/11/18 6/11/19  Arlin Bradley MD   Nutritional Supplements (GLUCERNA 1 0 JOHANN/CARBSTEADY) LIQD Take 1 Can by mouth 3 (three) times a day Dispense 90 cans per month 5/18/18   Jesús German MD   omeprazole (PriLOSEC) 40 MG capsule TAKE 1 CAPSULE(40 MG) BY MOUTH DAILY 4/23/19   Jorge uDrham MD   oxyCODONE (ROXICODONE) 20 MG TABS Take 1 tablet (20 mg total) by mouth every 8 (eight) hours as needed for moderate painMax Daily Amount: 60 mg 7/24/19   Jesús German MD   SUPREP BOWEL PREP KIT 17 5-3 13-1 6 GM/177ML SOLN Take 1 kit by mouth once for 1 dose Please follow instructions as per the office  4/23/19 4/23/19  Jorge Durham MD   aspirin 81 MG tablet Take 81 mg by mouth daily  8/23/19  Historical Provider, MD     I have reviewed home medications with a medical source (PCP, Pharmacy, other)  Allergies: No Known Allergies    Social History:     Marital Status: /Civil Union   Occupation:  None  Patient Pre-hospital Living Situation:   This is a family  Patient Pre-hospital Level of Mobility:  Bed-bound  Patient Pre-hospital Diet Restrictions:  None  Substance Use History:   Social History     Substance and Sexual Activity   Alcohol Use No    Comment: Per Allscripts:  Social drinker (Onset date:  11/10/17)     Social History     Tobacco Use   Smoking Status Former Smoker    Packs/day: 0 50    Years: 10 00    Pack years: 5 00    Last attempt to quit: Raul Phalen Years since quittin 6   Smokeless Tobacco Never Used   Tobacco Comment    Onset date:  11/10/17     Social History     Substance and Sexual Activity   Drug Use No       Family History:    Family History   Problem Relation Age of Onset    No Known Problems Mother     No Known Problems Father     Patient could not recall any family history of disease in his parents  Physical Exam:     Vitals:   Blood Pressure: (!) 85/54 (19 174)  Pulse: (!) 116 (19 174)  Temperature: (!) 100 8 °F (38 2 °C) (19)  Temp Source: Tympanic (19)  Respirations: 22 (19)  Weight - Scale: 83 9 kg (184 lb 15 5 oz) (19 1152)  SpO2: 96 % (19 174)    Physical Exam   Constitutional: No distress  HENT:   Head: Normocephalic and atraumatic  Eyes: No scleral icterus  Right eye corneal opacity   Neck: No JVD present  Cardiovascular:   Tachycardic   Pulmonary/Chest: Effort normal  No stridor  No respiratory distress  He has no wheezes  Abdominal:   Left lower quadrant colostomy   Genitourinary:   Genitourinary Comments: Scrotal edema  Hypogastric edema  Hematuria  Solomon in place   Musculoskeletal: He exhibits deformity  Status post right lower extremity amputation  Contracture deformity of the arms   Neurological: He is alert  Skin: Skin is warm and dry  He is not diaphoretic  Psychiatric: He has a normal mood and affect  Vitals reviewed  Additional Data:     Lab Results: I have personally reviewed pertinent reports        Results from last 7 days   Lab Units 19  1211   WBC Thousand/uL 6 18   HEMOGLOBIN g/dL 10 1*   HEMATOCRIT % 33 6*   PLATELETS Thousands/uL 283   BANDS PCT % 6   LYMPHO PCT % 6*   MONO PCT % 1*   EOS PCT % 0     Results from last 7 days   Lab Units 08/23/19  1211   SODIUM mmol/L 130*   POTASSIUM mmol/L 3 1*   CHLORIDE mmol/L 94*   CO2 mmol/L 24   BUN mg/dL 8   CREATININE mg/dL 0 70   ANION GAP mmol/L 12   CALCIUM mg/dL 8 6   ALBUMIN g/dL 2 2*   TOTAL BILIRUBIN mg/dL 0 75   ALK PHOS U/L 166*   ALT U/L 20   AST U/L 32   GLUCOSE RANDOM mg/dL 79     Results from last 7 days   Lab Units 08/23/19  1211   INR  1 22*             Results from last 7 days   Lab Units 08/23/19  1445 08/23/19  1211   LACTIC ACID mmol/L 1 3 2 4*       Imaging: I have personally reviewed pertinent reports   , I have personally reviewed pertinent films in PACS and I have personally reviewed pertinent films in PACS with a Radiologist     CT abdomen pelvis with contrast   Final Result by John Mendez MD (08/23 1731)         1  Multiple foci of soft tissue gas in the penis, perineum and scrotal region with soft tissue gas extending through the left perirectal space to the upper pelvis to the level of the sigmoid colon  The findings are suspicious for 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  2   There is soft tissue thickening with several foci of air adjacent to the penile implant, suggestive of infection of the penile implant  3  Multiple sites of sacral and ischial decubitus ulcers with chronic osteomyelitis of the ischial, pubic bones, and sacrum, similar to the prior exam          4   Small volume perihepatic ascites  5   Moderate gallbladder distention  No calcified gallstones  I personally discussed this study with Dr Velna Goodell   on 8/23/2019 at 5:29 PM                Workstation performed: [id number]             EKG, Pathology, and Other Studies Reviewed on Admission:   · EKG:  No recent EKG    Allscripts / Epic Records Reviewed: Yes     ** Please Note: This note has been constructed using a voice recognition system   **

## 2019-08-23 NOTE — PROCEDURES
2019    Geovany James [facility]  1960  [id number]    Diagnosis  Chief Complaint     Fever - 9 weeks to 74 years          Pre-operative Diagnosis: NGB with inability to CIC at home, RN unable to place, Fever with UA + for UTI at home  Post-operative Diagnosis: same    Time Out: Verbal timeout performed, patient confirmed name, , procedure  No laterality applicable  Consent: Patient was agreeable and gave verbal consent before start of procedure  Procedure: Dangelo Catheter Placement  Bladder catheterization  Date/Time: 2019 3:00 PM  Performed by: Sumaya Bermudez PA-C  Authorized by: Sumaya Bermudez PA-C     Consent:     Consent obtained:  Verbal    Consent given by:  Patient    Risks discussed:  False passage, infection, pain, incomplete procedure and urethral injury  Universal protocol:     Procedure explained and questions answered to patient or proxy's satisfaction: yes      Patient identity confirmed:  Verbally with patient  Pre-procedure details:     Procedure purpose:  Therapeutic    Preparation: Patient was prepped and draped in usual sterile fashion    Procedure details:     Bladder irrigation: yes      Catheter insertion:  Indwelling    Approach: natural orifice      Catheter type:  Coude    Catheter size:  18 Fr    Number of attempts:  3    Successful placement: yes      Urine characteristics:  Dark, cloudy and yellow    Provider performed due to: Altered anatomy, complicated insertion and nurse unable to complete    Altered anatomy details: hypospadius   Post-procedure details:     Patient tolerance of procedure: Tolerated well, no immediate complications        Patient placed in the supine position  Area prepped and draped with Betadine in the normal sterile fashion  Sterile lubricating Jelly was introduced into the urethral meatus for lubrication  22Fr 3-way dangelo attempted insertion without success   Significan resistance and clot/blood upon removal  Jelly was re-introduced into the meatus  18F Coude dangelo was inserted to the hub with minimal resistance  Cloudy yellow urine urine returned and 10 mL was placed in the balloon  Dangelo drained 1400 cc of cloudy yellow/yareli urine  Patient tolerated the procedure well  Attached to drainage bag  Complications:  None; patient tolerated the procedure well  Condition: stable      Plan  Maintain dangelo to gravity until 8/30/2019  May return to CIC 3x daily at that time       Sherry Ellsworth PA-C

## 2019-08-23 NOTE — ASSESSMENT & PLAN NOTE
· Maintain NPO status  · Anticipate urgent surgery tonight by Urology  · Start empiric vancomycin and Zosyn

## 2019-08-23 NOTE — CONSULTS
Consult - Urology   David Steward 1960, 63 y o  male MRN: [id number]    Unit/Bed#: ED 24 Encounter: [id number]    Neurogenic bladder  Assessment & Plan  With history of ileal cystoplasty augmentation in 2014  Maintained at home on CIC C3 times daily  Had difficulty catheter rising at home  RN and ER unable to place catheter  Moderately difficult Dangelo placement with 18 [employer] [place] coude  Given suspicion of false passage on multiple attempts to place catheter, recommend maintaining dangelo to gravity drainage for 7 days  Do not remove before 8/30/2019, not nursing managed  Treatment of UTI by primary team      Bedside rounds performed with RN  Discussed with Dr Susan Dickey  Urology will sign off but remain available for any further inpatient needs  Please feel free to call with questions or concerns  Subjective/Objective     Subjective: This 52-year-old male has a complex medical history and a complex  history  He is status post penile prosthesis placement 3+ years ago  He then had erosion of the posterior aspect of 1 of the cylinders secondary to decubitus ulcer  He was taken to the operating room with partial removal of the prosthesis through the decubitus ulcer, and the remainder of the prosthesis was preserved  Is currently not functioning  He also has a history of neurogenic bladder secondary to spinal cyst in 1989 causing paraplegia  He has a history of small contracted noncompliant bladder with ileal cysto plasty augmentation of the bladder in 2014 by Dr Val Barrientos  He is maintained on home on clean intermittent catheterization  He caths himself 3 times daily  He was able to perform straight catheterization yesterday without issue  This morning, he was unable and got blood return  Came to the ER because he was experiencing fevers  In the ER, multiple attempts were made by nursing to place catheter without success    Urology was relative to difficult Dangelo placement in this  complex male  He reports no blood in his urine yesterday when he was able to catheterize without issue  Review of Systems   Constitutional: Positive for fever  Negative for activity change and appetite change  HENT: Negative for congestion and ear pain  Eyes: Negative for pain  Respiratory: Negative for cough and shortness of breath  Cardiovascular: Negative for chest pain and palpitations  Gastrointestinal: Negative for abdominal distention, abdominal pain, blood in stool, constipation, diarrhea and nausea  Genitourinary: Positive for hematuria (Blood return on attempted by RN for placement of urethral Solomon)  Negative for difficulty urinating and dysuria  Musculoskeletal: Negative for arthralgias and myalgias  Skin: Negative for rash  Allergic/Immunologic: Negative for immunocompromised state  Neurological: Negative for dizziness and headaches  Hematological: Negative for adenopathy  Does not bruise/bleed easily  Psychiatric/Behavioral: Negative for agitation  The patient is not nervous/anxious  Objective:  Vitals: Blood pressure 134/81, pulse (!) 120, temperature 100 2 °F (37 9 °C), temperature source Oral, resp  rate 20, weight 83 9 kg (184 lb 15 5 oz), SpO2 96 %  ,Body mass index is 28 97 kg/m²  No intake or output data in the 24 hours ending 08/23/19 1458    Invasive Devices     Peripheral Intravenous Line            Peripheral IV 08/23/19 Left Antecubital less than 1 day          Drain            Colostomy Descending/sigmoid LLQ -- days    Closed/Suction Drain Left;Posterior; Other (Comment) Buttock Bulb 15 Fr  695 days    Urethral Catheter Coude less than 1 day                Physical Exam   Constitutional: He is oriented to person, place, and time  He appears well-developed and well-nourished  He is cooperative  He does not appear ill  No distress     60-year-old male with upper extremities there is scars and right lower extremity status post hip disarticulation resting comfortably in bed in the emergency department  HENT:   Head: Normocephalic and atraumatic  Moist mucous membranes  Eyes:   Right eye with visible cataract   Neck: Normal range of motion  Neck supple  No tracheal deviation present  Cardiovascular: Regular rhythm and normal heart sounds  No murmur heard  Tachycardic   Pulmonary/Chest: Effort normal and breath sounds normal  No respiratory distress  He has no wheezes  Good airflow bilaterally on deep inspiration  Abdominal: Soft  Bowel sounds are normal  He exhibits no distension and no mass  There is no tenderness  Ostomy with soft liquid stool in the bag  Midline laparotomy scar noted  Abdomen rotund  Deep palpation limited secondary to body habitus  Genitourinary: Uncircumcised  Genitourinary Comments: Significant hypospadias possibly secondary to chronic catheter erosion  Neurological: He is alert and oriented to person, place, and time  Skin: Skin is warm and dry  No rash noted  He is not diaphoretic  No erythema  No pallor  Psychiatric: He has a normal mood and affect  His behavior is normal  Judgment and thought content normal    Nursing note and vitals reviewed        History:    Past Medical History:   Diagnosis Date    Anemia     Blind     r eye    Chronic cystitis     Colostomy in place [facility])     Detrusor sphincter dyssynergia     Diabetes mellitus ([facility] [address] )     Poorly controlled type 2; Last Assessed:  3/18/14    Erectile dysfunction     Frequency of urination     GERD (gastroesophageal reflux disease)     History of diabetes mellitus     History of osteomyelitis     Hx of leg [facility]putation [facility])     r high upper leg    Hyperlipidemia     Hypertension     Hypospadias     Incomplete bladder emptying     Neurogenic bladder     Paralysis (HCC)     [facility]aplegia ([facility])     Spinal cord cysts     Ulcer of sacral region [facility])     Urge incontinence      Past Surgical History:   Procedure Laterality Date    AMPUTATION      At hip; Last Assessed:  16    BLADDER SURGERY      COLON SURGERY      llq ostomy pouch    COLOSTOMY      COMPLEX CYSTOMETROGRAM  2014    CYSTOSCOPY  2014    LEG AMPUTATION      MEATOTOMY      PENILE PROSTHESIS IMPLANT      MA ADJ TISS XFER SCALP,EXTREM 10 1-30 SQCM Left 2017    Procedure: POSTERIOR THIGH V-Y ADMANCEMENT;  Surgeon: Shade Espinosa MD;  [facility];  Service: Plastics    MA MUSCLE-SKIN FLAP,TRUNK Left 2017    Procedure: FLAP CLOSURE LEFT ISCHIAL WOUND and "RIGHT" ISCHIAL FLAP ADVANCEMENT * DEBRIDEMENT, VAC PLACEMENT ;  Surgeon: Shade Espinosa MD;  Location[facility];  Service: Plastics    MA MUSCLE-SKIN FLAP,TRUNK Left 2017    Procedure: gluteal myocutaneous rotational flap, posterior thigh v to y advancement- wound 5 x 2 5 x 8;  Surgeon: Shade Espinosa MD;  [facility];  Service: Plastics    SPINE SURGERY      Lower back    UROFLOWMETRY SIMPLE / COMPLEX       Family History   Problem Relation Age of Onset    No Known Problems Mother     No Known Problems Father      Social History     Socioeconomic History    Marital status: /Civil Union     Spouse name: None    Number of children: None    Years of education: None    Highest education level: None   Occupational History    None   Social Needs    Financial resource strain: None    Food insecurity:     Worry: None     Inability: None    Transportation needs:     Medical: None     Non-medical: None   Tobacco Use    Smoking status: Former Smoker     Packs/day: 0 50     Years: 10 00     Pack years: 5 00     Last attempt to quit: [address] [place] [address]     Years since quittin 6    Smokeless tobacco: Never Used    Tobacco comment: Onset date:  11/10/17   Substance and Sexual Activity    Alcohol use: No     Comment: Per Allscripts:  Social drinker (Onset date:  11/10/17)    Drug use: No    Sexual activity: None   Lifestyle    Physical activity:     Days per week: None     Minutes per session: None    Stress: None   Relationships    Social connections:     Talks on phone: None     Gets together: None     Attends Yazdanism service: None     Active member of club or organization: None     Attends meetings of clubs or organizations: None     Relationship status: None    Intimate partner violence:     Fear of current or ex partner: None     Emotionally abused: None     Physically abused: None     Forced sexual activity: None   Other Topics Concern    None   Social History Narrative    Native language Mongolian    Mandaeism    Social history reviewed, unchanged     Labs:  Recent Labs     08/23/19  1211   WBC 6 18     Recent Labs     08/23/19  1211   HGB 10 1*       Recent Labs     08/23/19  1211   CREATININE 0 70       Microbiology:  Urinalysis performed outpatient by Family Medicine 2 days ago 8/21/2019 with RBC, innumerable WBC, innumerable bacteria      Walt Barrios PA-C  Date: 8/23/2019 Time: 2:58 PM

## 2019-08-23 NOTE — ED NOTES
Multiple attempts at dangelo placement by self and Lisa Hacking unsuccessful Stephanie Gordon made aware        Kasia Almonte RN  08/23/19 0419

## 2019-08-23 NOTE — ED NOTES
Urology at bedside unable to obtain lactic acid at this time        Stephen Mcknight, RN  08/23/19 7331

## 2019-08-23 NOTE — PROGRESS NOTES
Dr Mae Zuniga made aware of patients BP 85/54, Pulse 116, and Temp 100 8   Per Dr Mae Zuniga continue with fluid bolus and keep patient NPO

## 2019-08-23 NOTE — TELEPHONE ENCOUNTER
Patients visiting nurse Aaron Bush is calling regarding pt urine from 08/21/19 she states they saw the results in eoic and the urine is Innumerable which is a sign of infection  She states pt is still having blood in his urine with fever she would like a call back asap [phone]

## 2019-08-23 NOTE — ASSESSMENT & PLAN NOTE
With history of ileal cystoplasty augmentation in 2014  Maintained at home on CIC C3 times daily  Had difficulty catheterizing at home  RN and ER unable to place catheter  Bedside Dangelo placement with 18 Hebrew coude by attending 8/23/19  Ventral penile erosion noted and chronic, no actively infected or inflamed appearing tissue about the penis scrotum or perineum  Given suspicion of false passage on multiple attempts to place catheter, recommend maintaining dangelo to gravity drainage for 7 days  Do not remove before 8/30/2019, not nursing managed  At that point patient may resume CIC  Clinically improving, afebrile, leukocytosis improving  Treatment of UTI by primary team, appreciate ID input for antibiotic coverage- current coverage de-escalated to cefazolin with plans for PO transition today

## 2019-08-23 NOTE — ASSESSMENT & PLAN NOTE
· Present on admission  · With chronic osteomyelitis  · Await surgical intervention tonight  · Consult wound care  · If more debridement is required, he may need acute care surgery

## 2019-08-24 PROBLEM — T83.61XD: Status: ACTIVE | Noted: 2019-08-23

## 2019-08-24 LAB
ANION GAP SERPL CALCULATED.3IONS-SCNC: 15 MMOL/L (ref 4–13)
BASOPHILS # BLD AUTO: 0.04 THOUSANDS/ΜL (ref 0–0.1)
BASOPHILS NFR BLD AUTO: 1 % (ref 0–1)
BUN SERPL-MCNC: 6 MG/DL (ref 5–25)
CALCIUM SERPL-MCNC: 8.3 MG/DL (ref 8.3–10.1)
CHLORIDE SERPL-SCNC: 107 MMOL/L (ref 100–108)
CO2 SERPL-SCNC: 18 MMOL/L (ref 21–32)
CREAT SERPL-MCNC: 0.51 MG/DL (ref 0.6–1.3)
EOSINOPHIL # BLD AUTO: 0.01 THOUSAND/ΜL (ref 0–0.61)
EOSINOPHIL NFR BLD AUTO: 0 % (ref 0–6)
ERYTHROCYTE [DISTWIDTH] IN BLOOD BY AUTOMATED COUNT: 17.6 % (ref 11.6–15.1)
GFR SERPL CREATININE-BSD FRML MDRD: 118 ML/MIN/1.73SQ M
GLUCOSE SERPL-MCNC: 128 MG/DL (ref 65–140)
GLUCOSE SERPL-MCNC: 48 MG/DL (ref 65–140)
GLUCOSE SERPL-MCNC: 95 MG/DL (ref 65–140)
HCT VFR BLD AUTO: 29.6 % (ref 36.5–49.3)
HGB BLD-MCNC: 8.8 G/DL (ref 12–17)
IMM GRANULOCYTES # BLD AUTO: 0.03 THOUSAND/UL (ref 0–0.2)
IMM GRANULOCYTES NFR BLD AUTO: 0 % (ref 0–2)
LYMPHOCYTES # BLD AUTO: 0.76 THOUSANDS/ΜL (ref 0.6–4.47)
LYMPHOCYTES NFR BLD AUTO: 9 % (ref 14–44)
MCH RBC QN AUTO: 24.4 PG (ref 26.8–34.3)
MCHC RBC AUTO-ENTMCNC: 29.7 G/DL (ref 31.4–37.4)
MCV RBC AUTO: 82 FL (ref 82–98)
MONOCYTES # BLD AUTO: 0.63 THOUSAND/ΜL (ref 0.17–1.22)
MONOCYTES NFR BLD AUTO: 8 % (ref 4–12)
NEUTROPHILS # BLD AUTO: 6.81 THOUSANDS/ΜL (ref 1.85–7.62)
NEUTS SEG NFR BLD AUTO: 82 % (ref 43–75)
NRBC BLD AUTO-RTO: 0 /100 WBCS
PLATELET # BLD AUTO: 285 THOUSANDS/UL (ref 149–390)
PMV BLD AUTO: 10.3 FL (ref 8.9–12.7)
POTASSIUM SERPL-SCNC: 3.5 MMOL/L (ref 3.5–5.3)
RBC # BLD AUTO: 3.61 MILLION/UL (ref 3.88–5.62)
SODIUM SERPL-SCNC: 140 MMOL/L (ref 136–145)
WBC # BLD AUTO: 8.28 THOUSAND/UL (ref 4.31–10.16)

## 2019-08-24 PROCEDURE — C9113 INJ PANTOPRAZOLE SODIUM, VIA: HCPCS | Performed by: INTERNAL MEDICINE

## 2019-08-24 PROCEDURE — 99223 1ST HOSP IP/OBS HIGH 75: CPT | Performed by: INTERNAL MEDICINE

## 2019-08-24 PROCEDURE — NC001 PR NO CHARGE: Performed by: UROLOGY

## 2019-08-24 PROCEDURE — 99232 SBSQ HOSP IP/OBS MODERATE 35: CPT | Performed by: INTERNAL MEDICINE

## 2019-08-24 PROCEDURE — 80048 BASIC METABOLIC PNL TOTAL CA: CPT | Performed by: INTERNAL MEDICINE

## 2019-08-24 PROCEDURE — 99222 1ST HOSP IP/OBS MODERATE 55: CPT | Performed by: SURGERY

## 2019-08-24 PROCEDURE — 85025 COMPLETE CBC W/AUTO DIFF WBC: CPT | Performed by: INTERNAL MEDICINE

## 2019-08-24 PROCEDURE — 99232 SBSQ HOSP IP/OBS MODERATE 35: CPT | Performed by: UROLOGY

## 2019-08-24 PROCEDURE — 82948 REAGENT STRIP/BLOOD GLUCOSE: CPT

## 2019-08-24 RX ORDER — MORPHINE SULFATE 4 MG/ML
4 INJECTION, SOLUTION INTRAMUSCULAR; INTRAVENOUS EVERY 4 HOURS PRN
Status: DISCONTINUED | OUTPATIENT
Start: 2019-08-24 | End: 2019-08-29 | Stop reason: HOSPADM

## 2019-08-24 RX ORDER — ACETAMINOPHEN 325 MG/1
650 TABLET ORAL EVERY 6 HOURS PRN
Status: DISCONTINUED | OUTPATIENT
Start: 2019-08-24 | End: 2019-08-29 | Stop reason: HOSPADM

## 2019-08-24 RX ADMIN — VANCOMYCIN HYDROCHLORIDE 125 MG: 5 INJECTION, POWDER, LYOPHILIZED, FOR SOLUTION INTRAVENOUS at 13:59

## 2019-08-24 RX ADMIN — VANCOMYCIN HYDROCHLORIDE 125 MG: 5 INJECTION, POWDER, LYOPHILIZED, FOR SOLUTION INTRAVENOUS at 20:23

## 2019-08-24 RX ADMIN — PIPERACILLIN SODIUM AND TAZOBACTAM SODIUM 3.38 G: 36; 4.5 INJECTION, POWDER, FOR SOLUTION INTRAVENOUS at 11:52

## 2019-08-24 RX ADMIN — PANTOPRAZOLE SODIUM 40 MG: 40 INJECTION, POWDER, FOR SOLUTION INTRAVENOUS at 08:21

## 2019-08-24 RX ADMIN — CEFTRIAXONE 1000 MG: 1 INJECTION, POWDER, FOR SOLUTION INTRAMUSCULAR; INTRAVENOUS at 13:59

## 2019-08-24 RX ADMIN — ENOXAPARIN SODIUM 40 MG: 40 INJECTION SUBCUTANEOUS at 11:52

## 2019-08-24 RX ADMIN — OXYCODONE HYDROCHLORIDE 20 MG: 10 TABLET ORAL at 20:22

## 2019-08-24 RX ADMIN — PIPERACILLIN SODIUM AND TAZOBACTAM SODIUM 3.38 G: 36; 4.5 INJECTION, POWDER, FOR SOLUTION INTRAVENOUS at 05:08

## 2019-08-24 RX ADMIN — POTASSIUM CHLORIDE AND SODIUM CHLORIDE 75 ML/HR: 900; 300 INJECTION, SOLUTION INTRAVENOUS at 14:32

## 2019-08-24 RX ADMIN — VANCOMYCIN HYDROCHLORIDE 1250 MG: 5 INJECTION, POWDER, LYOPHILIZED, FOR SOLUTION INTRAVENOUS at 05:57

## 2019-08-24 NOTE — CONSULTS
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  Examination is clinically without evidence of gangrenous changes  The large sacral decubitus ulcer probes to bone and extends cephalad as well as inferiorly and I believe that the air in the tissues is chronic due to this large open wound and communicate to all the spaces  I agree with Urology as planned to monitor the patient clinically is a believe his presentation is more related to UTI then to his chronic wounds  He will likely need a prosthetic removed but again will defer to Urology  With regards to the large stage IV sacral decubitus ulcer the wound itself is clean with minimal drainage and no evidence of active infection  Will continue with daily packing while in the hospital         History of Present Illness     HPI:  Sherwin Zhang is a 63 y o  male who presents with fever and difficulty with urination  He has a history of paraplegia with neurogenic bladder the often self-catheterize at home  Is unable to catheterize and have fevers came for evaluation  He has a long history of a chronic stage IV decubitus ulcer with chronic osteomyelitis       Inpatient consult to Acute Care Surgery  Consult performed by: Diego Brown MD  Consult ordered by: Asher Nunez MD          Review of Systems   Constitutional: Positive for chills and fever  Negative for unexpected weight change  HENT: Negative for congestion, sore throat and trouble swallowing  Eyes: Negative for discharge and itching  Respiratory: Negative for cough, shortness of breath and wheezing  Cardiovascular: Negative for chest pain and leg swelling  Gastrointestinal: Negative for abdominal distention and abdominal pain  Endocrine: Negative for cold intolerance and heat intolerance  Genitourinary: Positive for difficulty urinating and hematuria  Musculoskeletal: Positive for gait problem  Negative for arthralgias  Paraplegia   Skin: Positive for wound  Negative for color change  Neurological: Positive for weakness and numbness  Negative for dizziness and headaches  Paraplegia   Psychiatric/Behavioral: Negative for agitation and confusion  The patient is not nervous/anxious          Historical Information   Past Medical History:   Diagnosis Date    Anemia     Blind     r eye    Chronic cystitis     Colostomy in place [facility])     Detrusor sphincter dyssynergia     Diabetes mellitus ([facility] [address] )     Poorly controlled type 2; Last Assessed:  3/18/14    Erectile dysfunction     Frequency of urination     GERD (gastroesophageal reflux disease)     History of diabetes mellitus     History of osteomyelitis     Hx of leg amputation (HCC)     r high upper leg    Hyperlipidemia     Hypertension     Hypospadias     Incomplete bladder emptying     Neurogenic bladder     Paralysis (HCC)     Paraplegia (HCC)     Spinal cord cysts     Ulcer of sacral region ([facility] [address] )     Urge incontinence      Past Surgical History:   Procedure Laterality Date    AMPUTATION      At hip; Last Assessed:  1/19/16    BLADDER SURGERY      COLON SURGERY      llq ostomy pouch    COLOSTOMY      COMPLEX CYSTOMETROGRAM  2014    CYSTOSCOPY  2014    LEG AMPUTATION      MEATOTOMY      PENILE PROSTHESIS IMPLANT  2011    OK ADJ TISS XFER SCALP,EXTREM 10 1-30 SQCM Left 5/1/2017    Procedure: POSTERIOR THIGH V-Y ADMANCEMENT; Surgeon: Frankie Galeano MD;  [facility];  Service: Plastics    CT MUSCLE-SKIN FLAP,TRUNK Left 2017    Procedure: FLAP CLOSURE LEFT ISCHIAL WOUND and "RIGHT" ISCHIAL FLAP ADVANCEMENT * DEBRIDEMENT, VAC PLACEMENT ;  Surgeon: Frankie Galeano MD;  [facility];  Service: Plastics    CT MUSCLE-SKIN FLAP,TRUNK Left 2017    Procedure: gluteal myocutaneous rotational flap, posterior thigh v to y advancement- wound 5 x 2 5 x 8;  Surgeon: Frankie Galeano MD;  [facility];  Service: Plastics    SPINE SURGERY      Lower back    UROFLOWMETRY SIMPLE / COMPLEX       Social History   Social History     Substance and Sexual Activity   Alcohol Use Never    Frequency: Never    Comment: Per [employer]:  Social drinker (Onset date:  11/10/17)     Social History     Substance and Sexual Activity   Drug Use No     Social History     Tobacco Use   Smoking Status Former Smoker    Packs/day: 0 50    Years: 10 00    Pack years: 5 00    Last attempt to quit:     Years since quittin 6   Smokeless Tobacco Never Used   Tobacco Comment    Onset date:  11/10/17     Family History: non-contributory    Meds/Allergies   all current active meds have been reviewed  No Known Allergies    Objective   First Vitals:   Blood Pressure: 104/58 (19 1152)  Pulse: (!) 121 (19 1152)  Temperature: 100 2 °F (37 9 °C) (19 1152)  Temp Source: Oral (19 1152)  Respirations: 18 (19 1152)  Height: 5' 4" (162 6 cm) (19 2305)  Weight - Scale: 83 9 kg (184 lb 15 5 oz) (19 1152)  SpO2: 99 % (19 1152)    Current Vitals:   Blood Pressure: 120/64 (19 0750)  Pulse: 91 (19 0750)  Temperature: 99 4 °F (37 4 °C) (19 0750)  Temp Source: Temporal (19 0750)  Respirations: 18 (19 0750)  Height: 5' 4" (162 6 cm) (19 2305)  Weight - Scale: 83 9 kg (184 lb 15 5 oz) (19 1152)  SpO2: 100 % (19 0750)      Intake/Output Summary (Last 24 hours) at 8/24/2019 1000  Last data filed at 8/24/2019 2477  Gross per 24 hour   Intake 2490 ml   Output 4000 ml   Net -1510 ml       Invasive Devices     Peripheral Intravenous Line            Peripheral IV 08/23/19 Left Antecubital less than 1 day    Peripheral IV 08/23/19 Left Forearm less than 1 day          Drain            Colostomy Descending/sigmoid LLQ -- days    Urethral Catheter Coude less than 1 day                Physical Exam   Constitutional: He is oriented to person, place, and time  He appears well-developed and well-nourished  HENT:   Head: Normocephalic and atraumatic  Neck: Normal range of motion  Neck supple  Cardiovascular: Normal rate and regular rhythm  Pulmonary/Chest: Effort normal and breath sounds normal    Abdominal: Soft  He exhibits no distension  There is no tenderness  Ostomy   Musculoskeletal: He exhibits deformity  Paraplegia  Right leg amputation   Neurological: He is alert and oriented to person, place, and time  Paraplegia   Skin: Skin is warm and dry  Large stage IV decubitus ulcer on the left sacrum  The area has undermining cephalad as well as an caudal   There is obvious bone exposed with connections deep into the pelvis  There is no purulent drainage  The there is mostly good healthy granulation tissue  Psychiatric: He has a normal mood and affect  His behavior is normal        Lab Results: I have personally reviewed pertinent lab results  Imaging: I have personally reviewed pertinent films in PACS  EKG, Pathology, and Other Studies: I have personally reviewed pertinent reports

## 2019-08-24 NOTE — PROGRESS NOTES
UROLOGY PROGRESS NOTE   Patient Identifiers: Kathy Carlos (MRN [id number])  Date of Service: 8/24/2019    Subjective:     24 HR EVENTS:   CT scan performed yesterday demonstrated subcutaneous gas in the perirectal and perineal region as well as around components of his penile prosthesis  I was contacted by Internal Medicine to further evaluate for possible Vega's gangrene or other infection  Patient has  no complaints  Objective:     VITALS:    Vitals:    08/24/19 0750   BP: 120/64   Pulse: 91   Resp: 18   Temp: 99 4 °F (37 4 °C)   SpO2: 100%       INS & OUTS:  I/O last 24 hours:   In: 5182 [I V :440; IV Piggyback:2050]  Out: 3400 [PNWVM:0731]    LABS:  Lab Results   Component Value Date    HGB 8 8 (L) 08/24/2019    HCT 29 6 (L) 08/24/2019    WBC 8 28 08/24/2019     08/24/2019   ]    Lab Results   Component Value Date     02/17/2014    K 3 5 08/24/2019     08/24/2019    CO2 18 (L) 08/24/2019    BUN 6 08/24/2019    CREATININE 0 51 (L) 08/24/2019    CALCIUM 8 3 08/24/2019    GLUCOSE 92 02/17/2014   ]    INPATIENT MEDS:    Current Facility-Administered Medications:     oxyCODONE (ROXICODONE) immediate release tablet 20 mg, 20 mg, Oral, Q8H PRN, Cynthia Toledo MD    pantoprazole (PROTONIX) injection 40 mg, 40 mg, Intravenous, Q24H [address], Cynthia Toledo MD    piperacillin-tazobactam (ZOSYN) 3 375 g in sodium chloride 0 9 % 50 mL IVPB, 3 375 g, Intravenous, Q6H, Cynthia Toledo MD, Last Rate: 100 mL/hr at 08/24/19 0508, 3 375 g at 08/24/19 0508    sodium chloride 0 9 % with KCl 40 mEq/L infusion (premix), 75 mL/hr, Intravenous, Continuous, Odalis Walker PA-C, Last Rate: 75 mL/hr at 08/23/19 2316, 75 mL/hr at 08/23/19 2316    vancomycin (VANCOCIN) 1,250 mg in sodium chloride 0 9 % 250 mL IVPB, 15 mg/kg, Intravenous, Q12H, Cynthia Toledo MD, Last Rate: 166 7 mL/hr at 08/24/19 0557, 1,250 mg at 08/24/19 0557      Physical Exam:     GEN: alert and oriented x 3    RESP: breathing comfortably with no accessory muscle use    ABD: soft, non-tender, non-distended   There is no significant swelling, erythema, or necrosis of the scrotum or penis  There is no crepitus on palpation  It is nontender but the patient does not have normal sensation  His Solomon catheter is in place and draining clear yellow urine  Assessment:   Nonfunctional penile prosthesis with surrounding gas  Likely UTI  Plan:     The patient's tachycardia and fevers have improved with antibiotics and fluids  His exam is very under well min  There is no concern for 4 needs gangrene at this time  Blood in urine cultures are pending  Continue broad-spectrum antibiotics  Will cancel his surgery for today as I do not think he needs any urgent explant of his prosthetic  He may need eventual removal of this but this can be done in a less urgent fashion once the patient has been optimized for surgery  If the patient gets worse clinically then we would proceed with more urgent removal and debridement  Please do not hesitate to contact me with any questions  We will continue to follow

## 2019-08-24 NOTE — PROGRESS NOTES
Progress Note - Sidonie Mask 1960, 63 y o  male MRN: [id number]    Unit/Bed#: E4 -01 Encounter: [id number]    Primary Care Provider: Carl Jiménez MD   Date and time admitted to hospital: 8/23/2019 11:55 AM        * Sepsis with hypotension ([facility] [address] )  Assessment & Plan  · Secondary to deep tissue infection of the perineal area related to infected penile implant versus large stage IV decubitus ulcer with chronic osteomyelitis  · Discussed with urology Dr Robert Cisse felt that this was not Vega's gangrene and surgery is canceled for now  If he deteriorates, then the implant needs to be removed sooner  · Continue Zosyn and vancomycin  · Follow-up culture result  · Consult Infectious Disease  · Maintain on level 2 step down     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, subsequent encounter  Assessment & Plan  · Re-evaluation by urology today and felt that this was not developing Vega's gangrene  · Restart diet  · Continue empiric vancomycin and Zosyn  · Implant removal by urology if does not improve    Opioid use  Assessment & Plan  · PD [facility] website reviewed  Medications confirmed  GERD (gastroesophageal reflux disease)  Assessment & Plan  Switch Protonix to p o  Stage IV pressure ulcer of sacral region [facility])  Assessment & Plan  · Present on admission  · With chronic osteomyelitis  · Consult General surgery/wound care team for ongoing wound care  · Reposition Q 2  VTE Pharmacologic Prophylaxis:   Pharmacologic: Enoxaparin (Lovenox)  Mechanical VTE Prophylaxis in Place: No    Patient Centered Rounds: I have performed bedside rounds with nursing staff today  Discussions with Specialists or Other Care Team Provider:  Urology    Education and Discussions with Family / Patient:  Spoke briefly with his wife who is Maori-speaking  We will talk more when she visits him to the hospital using a     Time Spent for Care: 25 minutes    More than 50% of total time spent on counseling and coordination of care as described above  Current Length of Stay: 1 day(s)    Current Patient Status: Inpatient   Certification Statement: The patient will continue to require additional inpatient hospital stay due to Sepsis    Discharge Plan:  Patient will be here for the weekend    Code Status: Level 1 - Full Code      Subjective:   Patient denies any pain or discomfort  He has no sensation in the pelvic area  Fever has subsided    Objective:     Vitals:   Temp (24hrs), Av 9 °F (37 7 °C), Min:98 2 °F (36 8 °C), Max:101 °F (38 3 °C)    Temp:  [98 2 °F (36 8 °C)-101 °F (38 3 °C)] 99 4 °F (37 4 °C)  HR:  [] 91  Resp:  [17-22] 18  BP: ()/(47-81) 120/64  SpO2:  [95 %-100 %] 100 %  Body mass index is 31 75 kg/m²  Input and Output Summary (last 24 hours): Intake/Output Summary (Last 24 hours) at 2019 0955  Last data filed at 2019 5239  Gross per 24 hour   Intake 2490 ml   Output 4000 ml   Net -1510 ml       Physical Exam:     Physical Exam   Constitutional: No distress  HENT:   Head: Normocephalic and atraumatic  Eyes: No scleral icterus  Right corneal opacity   Neck: No JVD present  Cardiovascular: Normal rate  Exam reveals no gallop and no friction rub  No murmur heard  Pulmonary/Chest: Effort normal  No stridor  No respiratory distress  He has no wheezes  Abdominal: Soft  Genitourinary:   Genitourinary Comments: Scrotal edema  Hypogastric edema  Solomon catheter in place     Musculoskeletal: He exhibits deformity  Large stage IV sacral ulcer  Right lower extremity amputation  Contracture deformity right arm   Neurological: He is alert  Skin: Skin is warm and dry  He is not diaphoretic  Psychiatric: He has a normal mood and affect   His behavior is normal        Additional Data:     Labs:    Results from last 7 days   Lab Units 19  0434 19  1211   WBC Thousand/uL 8 28 6 18   HEMOGLOBIN g/dL 8 8* 10 1*   HEMATOCRIT % 29 6* 33 6*   PLATELETS Thousands/uL 285 283   BANDS PCT %  --  6   NEUTROS PCT % 82*  --    LYMPHS PCT % 9*  --    LYMPHO PCT %  --  6*   MONOS PCT % 8  --    MONO PCT %  --  1*   EOS PCT % 0 0     Results from last 7 days   Lab Units 08/24/19  0434  08/23/19  1211   SODIUM mmol/L 140   < > 130*   POTASSIUM mmol/L 3 5   < > 3 1*   CHLORIDE mmol/L 107   < > 94*   CO2 mmol/L 18*   < > 24   BUN mg/dL 6   < > 8   CREATININE mg/dL 0 51*   < > 0 70   ANION GAP mmol/L 15*   < > 12   CALCIUM mg/dL 8 3   < > 8 6   ALBUMIN g/dL  --   --  2 2*   TOTAL BILIRUBIN mg/dL  --   --  0 75   ALK PHOS U/L  --   --  166*   ALT U/L  --   --  20   AST U/L  --   --  32   GLUCOSE RANDOM mg/dL 48*   < > 79    < > = values in this interval not displayed  Results from last 7 days   Lab Units 08/23/19  1211   INR  1 22*     Results from last 7 days   Lab Units 08/24/19  0857   POC GLUCOSE mg/dl 95         Results from last 7 days   Lab Units 08/23/19  1445 08/23/19  1211   LACTIC ACID mmol/L 1 3 2 4*           * I Have Reviewed All Lab Data Listed Above  * Additional Pertinent Lab Tests Reviewed:  All Labs Within Last 24 Hours Reviewed    Imaging:    Imaging Reports Reviewed Today Include:  CT abdomen and pelvis  Imaging Personally Reviewed by Myself Includes:  None    Recent Cultures (last 7 days):           Last 24 Hours Medication List:     Current Facility-Administered Medications:  enoxaparin 40 mg Subcutaneous Q24H [facility] Sara Ruiz MD    oxyCODONE 20 mg Oral Q8H PRN Sara Ruiz MD    pantoprazole 40 mg Intravenous Q24H [facility] Sara Ruiz MD    piperacillin-tazobactam 3 375 g Intravenous Q6H Sara Ruiz MD Last Rate: 3 375 g (08/24/19 0508)   sodium chloride 0 9 % with KCl 40 mEq/L 75 mL/hr Intravenous Continuous Odalis Walker PA-C Last Rate: 75 mL/hr (08/23/19 4510)   vancomycin 15 mg/kg Intravenous Q12H Sara Ruiz MD Last Rate: 1,250 mg (08/24/19 8581)        Today, Patient Was Seen By: Sara Ruiz MD    ** Please Note: Dictation voice to text software may have been used in the creation of this document   **

## 2019-08-24 NOTE — ASSESSMENT & PLAN NOTE
· Secondary to deep tissue infection of the perineal area related to infected penile implant versus large stage IV decubitus ulcer with chronic osteomyelitis  · Discussed with urology Dr Pola Randolph felt that this was not Vega's gangrene and surgery is canceled for now  If he deteriorates, then the implant needs to be removed sooner    · Continue Zosyn and vancomycin  · Follow-up culture result  · Consult Infectious Disease  · Maintain on level 2 step down

## 2019-08-24 NOTE — CONSULTS
UROLOGY CONSULTATION NOTE     Patient Identifiers: Kellie Briscoe (MRN [id number])  Service Requesting Consultation:  Medicine  Service Providing Consultation:  Urology, Aram Ibarra MD    Date of Service: 8/24/2019  Inpatient consult to Urology  Consult performed by: Aram Ibarra MD  Consult ordered by: Rafy Gan MD          Please see other urology notes written on this admission

## 2019-08-24 NOTE — ASSESSMENT & PLAN NOTE
· Re-evaluation by urology today and felt that this was not developing Vega's gangrene  · Restart diet  · Continue empiric vancomycin and Zosyn  · Implant removal by urology if does not improve

## 2019-08-24 NOTE — PLAN OF CARE
Problem: Potential for Falls  Goal: Patient will remain free of falls  Description  INTERVENTIONS:  - Assess patient frequently for physical needs  -  Identify cognitive and physical deficits and behaviors that affect risk of falls  -  [place] fall precautions as indicated by assessment   - Educate patient/family on patient safety including physical limitations  - Instruct patient to call for assistance with activity based on assessment  - Modify environment to reduce risk of injury  - Consider OT/PT consult to assist with strengthening/mobility  Outcome: Progressing     Problem: Prexisting or High Potential for Compromised Skin Integrity  Goal: Skin integrity is maintained or improved  Description  INTERVENTIONS:  - Identify patients at risk for skin breakdown  - Assess and monitor skin integrity  - Assess and monitor nutrition and hydration status  - Monitor labs   - Assess for incontinence   - Turn and reposition patient  - Assist with mobility/ambulation  - Relieve pressure over bony prominences  - Avoid friction and shearing  - Provide appropriate hygiene as needed including keeping skin clean and dry  - Evaluate need for skin moisturizer/barrier cream  - Collaborate with interdisciplinary team   - Patient/family teaching  - Consider wound care consult   Outcome: Progressing     Problem: Nutrition/Hydration-ADULT  Goal: Nutrient/Hydration intake appropriate for improving, restoring or maintaining nutritional needs  Description  Monitor and assess patient's nutrition/hydration status for malnutrition  Collaborate with interdisciplinary team and initiate plan and interventions as ordered  Monitor patient's weight and dietary intake as ordered or per policy  Utilize nutrition screening tool and intervene as necessary  Determine patient's food preferences and provide high-protein, high-caloric foods as appropriate       INTERVENTIONS:  - Monitor oral intake, urinary output, labs, and treatment plans  - Assess nutrition and hydration status and recommend course of action  - Evaluate amount of meals eaten  - Assist patient with eating if necessary   - Allow adequate time for meals  - Recommend/ encourage appropriate diets, oral nutritional supplements, and vitamin/mineral supplements  - Order, calculate, and assess calorie counts as needed  - Recommend, monitor, and adjust tube feedings and TPN/PPN based on assessed needs  - Assess need for intravenous fluids  - Provide specific nutrition/hydration education as appropriate  - Include patient/family/caregiver in decisions related to nutrition  Outcome: Progressing     Problem: PAIN - ADULT  Goal: Verbalizes/displays adequate comfort level or baseline comfort level  Description  Interventions:  - Encourage patient to monitor pain and request assistance  - Assess pain using appropriate pain scale  - Administer analgesics based on type and severity of pain and evaluate response  - Implement non-pharmacological measures as appropriate and evaluate response  - Consider cultural and social influences on pain and pain management  - Notify physician/advanced practitioner if interventions unsuccessful or patient reports new pain  Outcome: Progressing     Problem: INFECTION - ADULT  Goal: Absence or prevention of progression during hospitalization  Description  INTERVENTIONS:  - Assess and monitor for signs and symptoms of infection  - Monitor lab/diagnostic results  - Monitor all insertion sites, i e  indwelling lines, tubes, and drains  - [place] appropriate cooling/warming therapies per order  - Administer medications as ordered  - Instruct and encourage patient and family to use good hand hygiene technique  - Identify and instruct in appropriate isolation precautions for identified infection/condition   Outcome: Progressing     Problem: SAFETY ADULT  Goal: Maintain or return to baseline ADL function  Description  INTERVENTIONS:  -  Assess patient's ability to carry out ADLs; assess patient's baseline for ADL function and identify physical deficits which impact ability to perform ADLs (bathing, care of mouth/teeth, toileting, grooming, dressing, etc )  - Assess/evaluate cause of self-care deficits   - Assess range of motion  - Assess patient's mobility; develop plan if impaired  - Assess patient's need for assistive devices and provide as appropriate  - Encourage maximum independence but intervene and supervise when necessary  - Involve family in performance of ADLs  - Assess for home care needs following discharge   - Consider OT consult to assist with ADL evaluation and planning for discharge  - Provide patient education as appropriate  Outcome: Progressing  Goal: Maintain or return mobility status to optimal level  Description  INTERVENTIONS:  - Assess patient's baseline mobility status (ambulation, transfers, stairs, etc )    - Identify cognitive and physical deficits and behaviors that affect mobility  - Identify mobility aids required to assist with transfers and/or ambulation (gait belt, sit-to-stand, lift, walker, cane, etc )  - [place] fall precautions as indicated by assessment  - Record patient progress and toleration of activity level on Mobility SBAR; progress patient to next Phase/Stage  - Instruct patient to call for assistance with activity based on assessment  - Consider rehabilitation consult to assist with strengthening/weightbearing, etc   Outcome: Progressing     Problem: DISCHARGE PLANNING  Goal: Discharge to home or other facility with appropriate resources  Description  INTERVENTIONS:  - Identify barriers to discharge w/patient and caregiver  - Arrange for needed discharge resources and transportation as appropriate  - Identify discharge learning needs (meds, wound care, etc )  - Refer to Case Management Department for coordinating discharge planning if the patient needs post-hospital services based on physician/advanced practitioner order or complex needs related to functional status, cognitive ability, or social support system   Outcome: Progressing     Problem: Knowledge Deficit  Goal: Patient/family/caregiver demonstrates understanding of disease process, treatment plan, medications, and discharge instructions  Description  Complete learning assessment and assess knowledge base    Interventions:  - Provide teaching at level of understanding  - Provide teaching via preferred learning methods  Outcome: Progressing     Problem: CARDIOVASCULAR - ADULT  Goal: Maintains optimal cardiac output and hemodynamic stability  Description  INTERVENTIONS:  - Monitor I/O, vital signs and rhythm  - Monitor for S/S and trends of decreased cardiac output  - Administer and titrate ordered vasoactive medications to optimize hemodynamic stability  - Assess quality of pulses, skin color and temperature  - Assess for signs of decreased coronary artery perfusion  - Instruct patient to report change in severity of symptoms  Outcome: Progressing  Goal: Absence of cardiac dysrhythmias or at baseline rhythm  Description  INTERVENTIONS:  - Continuous cardiac monitoring, vital signs, obtain 12 lead EKG if ordered  - Administer antiarrhythmic and heart rate control medications as ordered  - Monitor electrolytes and administer replacement therapy as ordered  Outcome: Progressing

## 2019-08-24 NOTE — ASSESSMENT & PLAN NOTE
· Present on admission  · With chronic osteomyelitis  · Consult General surgery/wound care team for ongoing wound care    · Reposition Q 2

## 2019-08-24 NOTE — CONSULTS
Consultation - Infectious Disease   Katty Farooqcat 63 y o  male MRN: [id number]  Unit/Bed#: E4 -01 Encounter: [id number]      IMPRESSION & RECOMMENDATIONS:   1  Sepsis, POA  Patient presented on admission with hypotension along with tachycardia and low-grade fever  This is likely due to problem 2  Extensive surgical evaluations noted  CT imaging noted  Prior culture data reviewed  Current cultures pending  Continue antibiotics as below  Continue to trend fever curve/vitals   Repeat CBC/chemistry tomorrow  Additional care as per primary  Additional interventions pending clinical course    2  Complicated urinary tract infection  Patient likely has complicated urinary tract infection given history of difficulty with straight catheterization  He is now status post Solomon catheter placement  Cultures pending  Prior cultures reviewed  Imaging without findings of pyelonephritis or obstruction  Narrow patient to ceftriaxone  Continue to trend fever curve/vitals  Repeat CBC/chemistry tomorrow  Follow up pending culture data  Solomon catheter as per Urology  Additional care as per primary  Will likely treat for total of 5 days pending culture data    3  Abnormal CT of the pelvis  Patient noted to have an abnormal CT mentioning air within the soft tissues within the perineum and potentially involving his penile prosthesis  His exam is not consistent with necrotizing infection  Urology and surgical evaluations appreciated  No current issues clinically at penile prosthesis  Continue antibiotics as above  Recommend evaluation as outpatient for prosthesis removed  Ongoing follow-up by urology  Additional care as per primary    4  Chronic sacral ulcers and penile implant  Ulcerations possibly contributed to abnormal CT imaging  Surgical evaluation without signs of infection at ulcer sites  Urology evaluations with the signs of infection at prosthesis  Patient otherwise clinically improving    Continue local wound care as per surgery  Evaluation for prosthesis removal as outpatient  Maintain contact isolation--prior MRSA isolation  Recommend wound care/surgical follow-up as outpatient  Ongoing follow-up by urology  Additional care as per primary    5  Paraplegia  Additional supportive care as per primary      6  History of C diff  Previously documented testing in our system  This however was documented on the patient's previous primary care visits  Currently no changes stool output ostomy  Will start on vancomycin prophylaxis  Monitor stool output into ostomy  Continue to trend fever curve/vitals  Additional care as per primary    Above plan discussed in detail with the patient  Primary service updated of the above plan  ID consult service will continue to follow  HISTORY OF PRESENT ILLNESS:  Reason for Consult:  [place] Gangrene, UTI, sepsis     HPI: Leandro Jain is a 63y o  year old male with complicated past medical history which includes paraplegia, colostomy, poorly controlled diabetes, history of osteomyelitis and leg amputation, erectile dysfunction with penile implant and chronic stage IV ulcer in the sacral with chronic osteomyelitis  Patient presented to the hospital this time with fever and difficulty with urination  Patient has had previous complications with his penile implant  Patient noted blood when he was attempting to self-catheterize at home and then reported fevers to 101 and 102  Patient was started on IV antibiotics  CT was done concern from the CT was for a deep perineal infection concerning for gangrene  Patient was noted to be hypotensive at that time  Was also noted with low-grade fever and tachycardia  He was without leukocytosis  Creatinine unremarkable  Lactate at 2 4  Soft tissue thickening was noted with foci of air adjacent to the penile implant on CT again suggestive of infection  Imaging also commented on chronic osteomyelitis of the sacral wound  Gallbladder was also noted to be distended but there was no stones  Patient was started on broad-spectrum antibiotics with vancomycin and Zosyn  Urology was consulted to evaluate the patient immediately  Wound care was also consulted as well as acute care surgery  Patient was evaluated by Urology on admission as well as again today  Their exam was not concerning for necrotizing infection progressive infection at the penile implant  The implant itself is nonfunctional   Patient was not recommended for removal on last further complications occur  Suspicion at this time is for urinary tract infection leading his presentation  Similar exam findings by acute care surgery and the belief is that the patient has air in his tissues is likely due to his chronic/large ulcerations that are in communication with this site  The wound reportedly is clean minimal drainage and without signs of active infection  Patient with low-grade fever this morning  White blood cell count 8 2  Blood and urine cultures are pending  No new imaging overnight  Patient's other vitals have improved and are stable  Chemistry is stable and CBC with differential is unremarkable  UA noted to be grossly abnormal   Chart reviewed and patient was seen by myself an outpatient in October  Recent and prior urinary cultures reviewed with relatively susceptible E coli  Wound cultures in the past with MRSA and group B strep  We are consulted at this time for further assistance in management given this patient's presentation of sepsis and improvement on antibiotic therapy  On evaluation, patient is a very pleasant 28-year-old gentleman  He is able to provide adequate history  He reports to me that he last successfully self catheterized on Thursday  Then at Friday at 4:30 a m  In the morning he was unable to drain urine  Again he attempted drainage at 8 o'clock in the morning and was unsuccessful    After that he started to developed fevers and presented to the hospital   Patient has not had difficulty with catheter rising in the past   He also noted that when he attempted to catheterize he noted blood  He has difficulty in terms of describing pain and he has he has no sensation  He denies seeing any changes at his wounds including on dressing changes which she performs with his wife at home along with home health aide  He had no other symptoms to report prior to this  He is aware of the nonfunctional status of his implant in the need for removal   He otherwise denies having any other prosthetic material in his body and denies any allergies to antibiotics  He does recall his previous history of C diff but denies any changes in his ostomy output at this time  He has no other acute issues to report  REVIEW OF SYSTEMS:  A complete 12 point system-based review of systems is negative other than that noted in the HPI      PAST MEDICAL HISTORY:  Past Medical History:   Diagnosis Date    Anemia     Blind     r eye    Chronic cystitis     Col[facility] in place [facility])     Detrusor sphincter dyssynergia     Diabetes mellitus ([facility] )     Poorly controlled type 2; Last Assessed:  3/18/14    Erectile dysfunction     Frequency of urination     GERD (gastroesophageal reflux disease)     History of diabetes mellitus     History of osteomyelitis     Hx of leg amputation (HCC)     r high upper leg    Hyperlipidemia     Hypertension     Hypospadias     Incomplete bladder emptying     Neurogenic bladder     Paralysis ([address] )     [facility]     Spinal cord cysts     Ulcer of sacral region ([facility] )     Urge incontinence      Past Surgical History:   Procedure Laterality Date    AMPUTATION      At hip; Last Assessed:  1/19/16    BLADDER SURGERY      COLON SURGERY      llq ostomy pouch    COLOSTOMY      COMPLEX CYSTOMETROGRAM  2014    CYSTOSCOPY  2014    LEG AMPUTATION      MEATOTOMY      PENILE PROSTHESIS IMPLANT  2011    NC ADJ TISS Karina Win SCALP,EXTREM 10 1-30 SQCM Left 2017    Procedure: POSTERIOR THIGH V-Y ADMANCEMENT;  Surgeon: Nikos Vu MD;  [facility];  Service: Plastics    IN MUSCLE-SKIN FLAP,TRUNK Left 2017    Procedure: FLAP CLOSURE LEFT ISCHIAL WOUND and "RIGHT" ISCHIAL FLAP ADVANCEMENT * DEBRIDEMENT, VAC PLACEMENT ;  Surgeon: Nikos Vu MD;  [facility];  Service: Plastics    IN MUSCLE-SKIN FLAP,TRUNK Left 2017    Procedure: gluteal myocutaneous rotational flap, posterior thigh v to y advancement- wound 5 x 2 5 x 8;  Surgeon: Nikos Vu MD;  [facility];  Service: Plastics    SPINE SURGERY      Lower back    UROFLOWMETRY SIMPLE / COMPLEX         FAMILY HISTORY:  Non-contributory    SOCIAL HISTORY:  Social History   Social History     Substance and Sexual Activity   Alcohol Use Never    Frequency: Never    Comment: Per [employer]:  Social drinker (Onset date:  11/10/17)     Social History     Substance and Sexual Activity   Drug Use No     Social History     Tobacco Use   Smoking Status Former Smoker    Packs/day: 0 50    Years: 10 00    Pack years: 5 00    Last attempt to quit: [employer] Years since quittin 6   Smokeless Tobacco Never Used   Tobacco Comment    Onset date:  11/10/17       ALLERGIES:  No Known Allergies    MEDICATIONS:  All current active medications have been reviewed    PHYSICAL EXAM:  Temp:  [98 2 °F (36 8 °C)-101 °F (38 3 °C)] 100 3 °F (37 9 °C)  HR:  [] 68  Resp:  [17-22] 18  BP: ()/(47-81) 107/55  SpO2:  [95 %-100 %] 100 %  Temp (24hrs), Av 9 °F (37 7 °C), Min:98 2 °F (36 8 °C), Max:101 °F (38 3 °C)  Current: Temperature: 100 3 °F (37 9 °C)    Intake/Output Summary (Last 24 hours) at 2019 1154  Last data filed at 2019 4719  Gross per 24 hour   Intake 2490 ml   Output 4000 ml   Net -1510 ml       General Appearance:  Chronically ill-appearing, nontoxic, and in no distress   Head:  Normocephalic, without obvious abnormality, atraumatic Eyes:  Conjunctiva pink and sclera anicteric, both eyes; patient with corneal opacity in the right eye, chronic  Nose: Nares normal, mucosa normal, no drainage   Throat: Oropharynx moist without lesions; poor dentition noted   Neck: Supple, symmetrical, no adenopathy, no tenderness/mass/nodules   Back:   Symmetric, no curvature, ROM normal, unable to assess for spinal tenderness or CVA tenderness as patient has poor sensation throughout his body  Lungs:   Clear to auscultation bilaterally, respirations unlabored on room air   Chest Wall:  No tenderness or deformity   Heart:  RRR; no murmur, rub or gallop   Abdomen:   Soft, discomfort elicited, non-distended, positive bowel sounds; ostomy in place draining brown pasty stool   Extremities: No cyanosis, clubbing or edema; patient has had amputation on the right leg  Skin: Patient has large sacral ulceration which has some murky brown drainage which is chronic on the left sacrum  Wound bed itself appears without any acute signs of infection  There is no pain or crepitus appreciated on palpation of the perineum  No changes noted to the penis or the scrotum  Lymph nodes: Cervical, supraclavicular nodes normal   Neurologic: Alert and oriented times 3, patient is able to freely move his upper extremities  He has no range of motion in his lower extremities  LABS, IMAGING, & OTHER STUDIES:  Lab Results:  I have personally reviewed pertinent labs    Results from last 7 days   Lab Units 08/24/19  0434 08/23/19  1211   WBC Thousand/uL 8 28 6 18   HEMOGLOBIN g/dL 8 8* 10 1*   PLATELETS Thousands/uL 285 283     Results from last 7 days   Lab Units 08/24/19  0434  08/23/19  1211   POTASSIUM mmol/L 3 5   < > 3 1*   CHLORIDE mmol/L 107   < > 94*   CO2 mmol/L 18*   < > 24   BUN mg/dL 6   < > 8   CREATININE mg/dL 0 51*   < > 0 70   EGFR ml/min/1 73sq m 118   < > 104   CALCIUM mg/dL 8 3   < > 8 6   AST U/L  --   --  32   ALT U/L  --   --  20   ALK PHOS U/L  --   -- 166*    < > = values in this interval not displayed  Imaging Studies:   I have personally reviewed pertinent imaging study reports and images in PACS  Other Studies:   I have personally reviewed pertinent reports

## 2019-08-25 PROBLEM — Z86.19 HISTORY OF CLOSTRIDIUM DIFFICILE COLITIS: Status: ACTIVE | Noted: 2019-08-25

## 2019-08-25 LAB
ANION GAP SERPL CALCULATED.3IONS-SCNC: 9 MMOL/L (ref 4–13)
BACTERIA UR CULT: ABNORMAL
BASOPHILS # BLD AUTO: 0.04 THOUSANDS/ΜL (ref 0–0.1)
BASOPHILS NFR BLD AUTO: 0 % (ref 0–1)
BUN SERPL-MCNC: 7 MG/DL (ref 5–25)
CALCIUM SERPL-MCNC: 8.8 MG/DL (ref 8.3–10.1)
CHLORIDE SERPL-SCNC: 109 MMOL/L (ref 100–108)
CO2 SERPL-SCNC: 22 MMOL/L (ref 21–32)
CREAT SERPL-MCNC: 0.44 MG/DL (ref 0.6–1.3)
EOSINOPHIL # BLD AUTO: 0.06 THOUSAND/ΜL (ref 0–0.61)
EOSINOPHIL NFR BLD AUTO: 1 % (ref 0–6)
ERYTHROCYTE [DISTWIDTH] IN BLOOD BY AUTOMATED COUNT: 18 % (ref 11.6–15.1)
GFR SERPL CREATININE-BSD FRML MDRD: 126 ML/MIN/1.73SQ M
GLUCOSE SERPL-MCNC: 90 MG/DL (ref 65–140)
HCT VFR BLD AUTO: 28.6 % (ref 36.5–49.3)
HGB BLD-MCNC: 8.9 G/DL (ref 12–17)
IMM GRANULOCYTES # BLD AUTO: 0.05 THOUSAND/UL (ref 0–0.2)
IMM GRANULOCYTES NFR BLD AUTO: 1 % (ref 0–2)
LYMPHOCYTES # BLD AUTO: 1.25 THOUSANDS/ΜL (ref 0.6–4.47)
LYMPHOCYTES NFR BLD AUTO: 12 % (ref 14–44)
MCH RBC QN AUTO: 25.1 PG (ref 26.8–34.3)
MCHC RBC AUTO-ENTMCNC: 31.1 G/DL (ref 31.4–37.4)
MCV RBC AUTO: 81 FL (ref 82–98)
MONOCYTES # BLD AUTO: 0.79 THOUSAND/ΜL (ref 0.17–1.22)
MONOCYTES NFR BLD AUTO: 7 % (ref 4–12)
NEUTROPHILS # BLD AUTO: 8.55 THOUSANDS/ΜL (ref 1.85–7.62)
NEUTS SEG NFR BLD AUTO: 79 % (ref 43–75)
NRBC BLD AUTO-RTO: 0 /100 WBCS
PLATELET # BLD AUTO: 327 THOUSANDS/UL (ref 149–390)
PMV BLD AUTO: 9.8 FL (ref 8.9–12.7)
POTASSIUM SERPL-SCNC: 3.6 MMOL/L (ref 3.5–5.3)
RBC # BLD AUTO: 3.54 MILLION/UL (ref 3.88–5.62)
SODIUM SERPL-SCNC: 140 MMOL/L (ref 136–145)
WBC # BLD AUTO: 10.74 THOUSAND/UL (ref 4.31–10.16)

## 2019-08-25 PROCEDURE — 99233 SBSQ HOSP IP/OBS HIGH 50: CPT | Performed by: INTERNAL MEDICINE

## 2019-08-25 PROCEDURE — 99232 SBSQ HOSP IP/OBS MODERATE 35: CPT | Performed by: INTERNAL MEDICINE

## 2019-08-25 PROCEDURE — 85025 COMPLETE CBC W/AUTO DIFF WBC: CPT | Performed by: INTERNAL MEDICINE

## 2019-08-25 PROCEDURE — NC001 PR NO CHARGE: Performed by: UROLOGY

## 2019-08-25 PROCEDURE — 80048 BASIC METABOLIC PNL TOTAL CA: CPT | Performed by: INTERNAL MEDICINE

## 2019-08-25 RX ORDER — PANTOPRAZOLE SODIUM 40 MG/1
40 TABLET, DELAYED RELEASE ORAL
Status: DISCONTINUED | OUTPATIENT
Start: 2019-08-25 | End: 2019-08-29 | Stop reason: HOSPADM

## 2019-08-25 RX ADMIN — OXYCODONE HYDROCHLORIDE 20 MG: 10 TABLET ORAL at 17:09

## 2019-08-25 RX ADMIN — POTASSIUM CHLORIDE AND SODIUM CHLORIDE 75 ML/HR: 900; 300 INJECTION, SOLUTION INTRAVENOUS at 05:28

## 2019-08-25 RX ADMIN — VANCOMYCIN HYDROCHLORIDE 125 MG: 5 INJECTION, POWDER, LYOPHILIZED, FOR SOLUTION INTRAVENOUS at 09:53

## 2019-08-25 RX ADMIN — VANCOMYCIN HYDROCHLORIDE 125 MG: 5 INJECTION, POWDER, LYOPHILIZED, FOR SOLUTION INTRAVENOUS at 20:27

## 2019-08-25 RX ADMIN — CEFTRIAXONE 1000 MG: 1 INJECTION, POWDER, FOR SOLUTION INTRAMUSCULAR; INTRAVENOUS at 13:21

## 2019-08-25 RX ADMIN — ENOXAPARIN SODIUM 40 MG: 40 INJECTION SUBCUTANEOUS at 09:53

## 2019-08-25 RX ADMIN — PANTOPRAZOLE SODIUM 40 MG: 40 TABLET, DELAYED RELEASE ORAL at 09:53

## 2019-08-25 NOTE — ASSESSMENT & PLAN NOTE
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

## 2019-08-25 NOTE — ASSESSMENT & PLAN NOTE
· Present on admission  · With chronic osteomyelitis    · Daily packing and wound care per General surgery  · Reposition Q 2

## 2019-08-25 NOTE — SEPSIS NOTE
Sepsis Note   Maribell Briones Moscat 63 y o  male MRN: [id number]  Unit/Bed#: E4 -01 Encounter: [id number]      qSOFA     Row Name 08/25/19 0550 08/25/19 0500 08/25/19 0330 08/24/19 2343 08/24/19 2243    Altered mental status GCS < 15  0  0          Respiratory Rate > / =22    0  0  1  0    Systolic BP < / =446      0  0      Q Sofa Score  0  0  0  1  1    Row Name 08/24/19 2052 08/24/19 2020 08/24/19 1630 08/24/19 1142 08/24/19 0750    Altered mental status GCS < 15    0          Respiratory Rate > / =22  0    0  0  0    Systolic BP < / =403  1    0  0  0    Q Sofa Score  1  0  0  0  0    Row Name 08/24/19 0340 08/24/19 0029 08/23/19 2305 08/23/19 2100 08/23/19 2044    Altered mental status GCS < 15  0  0    0      Respiratory Rate > / =22      0    0    Systolic BP < / =143      1    1    Q Sofa Score    1  1  1  1    Row Name 08/23/19 1747 08/23/19 1655 08/23/19 1624 08/23/19 1545 08/23/19 1515    Altered mental status GCS < 15              Respiratory Rate > / =22  1  1  0  0  0    Systolic BP < / =945  1  0  0  1  1    Q Sofa Score  2  1  0  1  1    Row Name 08/23/19 1445 08/23/19 1415 08/23/19 1345 08/23/19 1315 08/23/19 1245    Altered mental status GCS < 15              Respiratory Rate > / =22  0  1  0  0  0    Systolic BP < / =782  0  1  1  0  0    Q Sofa Score  0  2  1  0  0    Row Name 08/23/19 1152                Altered mental status GCS < 15          Respiratory Rate > / =90  0        Systolic BP < / =203  0        Q Sofa Score  0            Initial Sepsis Screening     Row Name 08/23/19 1315                Is the patient's history suggestive of a new or worsening infection?         Suspected source of infection  urinary tract infection  -AH        Are two or more of the following signs & symptoms of infection both present and new to the patient?           Indicate SIRS criteria  Hyperthemia > 38 3C (100 9F)  -AH        If the answer is yes to both questions, suspicion of sepsis is present          If severe sepsis is present AND tissue hypoperfusion perists in the hour after fluid resuscitation or lactate > 4, the patient meets criteria for SEPTIC SHOCK          Are any of the following organ dysfunction criteria present within 6 hours of suspected infection and SIRS criteria that are NOT considered to be chronic conditions?         Organ dysfunction  Lactate > 2 0 mmol/L  -        Date of presentation of severe sepsis          Time of presentation of severe sepsis          Tissue hypoperfusion persists in the hour after crystalloid fluid administration, evidenced, by either:          Was hypotension present within one hour of the conclusion of crystalloid fluid administration?   No  -AH        Date of presentation of septic shock          Time of presentation of septic shock            User Key  (r) = Recorded By, (t) = Taken By, (c) = Cosigned By    Initials Name Provider Type     Stephanie Vickers PA-C Physician Assistant               Default Flowsheet Data (last 720 hours)      Sepsis Reassess     Row Name 08/23/19 1523                   Repeat Volume Status and Tissue Perfusion Assessment Performed    Repeat Volume Status and Tissue Perfusion Assessment Performed  Yes  -AH           Volume Status and Tissue Perfusion Post Fluid Resuscitation * Must Document All *    Vital Signs Reviewed (HR, RR, BP, T)  Yes  -AH        Shock Index Reviewed          Arterial Oxygen Saturation Reviewed (POx, SaO2 or SpO2)          Cardio  Regular rate and rhythm  -AH        Pulmonary  Normal effort  -AH        Capillary Refill  Brisk  -        Peripheral Pulses  Radial  -        Peripheral Pulse  +3  -AH        Skin  Warm  -AH        Urine output assessed  Adequate  -           *OR*   Intensive Monitoring- Must Document One of the Following Four *:    Vital Signs Reviewed          * Central Venous Pressure (CVP or RAP)          * Central Venous Oxygen (SVO2, ScvO2 or Oxygen saturation via central catheter)          * Bedside Cardiovascular US in IVC diameter and % collapse          * Passive Leg Raise OR Crystalloid Challenge            User Key  (r) = Recorded By, (t) = Taken By, (c) = Cosigned By    Initials Name Provider Type    CHRISSY Mccord, PAHarisC Physician Assistant

## 2019-08-25 NOTE — NURSING NOTE
Paged on call urology ref to catheter not draining  Bladder scanned patient for 856  MARYCHUY DIAZ aware   Waiting for call back

## 2019-08-25 NOTE — SEPSIS NOTE
Sepsis Note   Lula Srivastava Moscat 63 y o  male MRN: [id number]  Unit/Bed#: E4 -01 Encounter: [id number]      qSOFA     Row Name 08/25/19 0550 08/25/19 0500 08/25/19 0330 08/24/19 2343 08/24/19 2243    Altered mental status GCS < 15  0  0          Respiratory Rate > / =22    0  0  1  0    Systolic BP < / =378      0  0      Q Sofa Score  0  0  0  1  1    Row Name 08/24/19 2052 08/24/19 2020 08/24/19 1630 08/24/19 1142 08/24/19 0750    Altered mental status GCS < 15    0          Respiratory Rate > / =22  0    0  0  0    Systolic BP < / =639  1    0  0  0    Q Sofa Score  1  0  0  0  0    Row Name 08/24/19 0340 08/24/19 0029 08/23/19 2305 08/23/19 2100 08/23/19 2044    Altered mental status GCS < 15  0  0    0      Respiratory Rate > / =22      0    0    Systolic BP < / =199      1    1    Q Sofa Score    1  1  1  1    Row Name 08/23/19 1747 08/23/19 1655 08/23/19 1624 08/23/19 1545 08/23/19 1515    Altered mental status GCS < 15              Respiratory Rate > / =22  1  1  0  0  0    Systolic BP < / =202  1  0  0  1  1    Q Sofa Score  2  1  0  1  1    Row Name 08/23/19 1445 08/23/19 1415 08/23/19 1345 08/23/19 1315 08/23/19 1245    Altered mental status GCS < 15              Respiratory Rate > / =22  0  1  0  0  0    Systolic BP < / =474  0  1  1  0  0    Q Sofa Score  0  2  1  0  0    Row Name 08/23/19 1152                Altered mental status GCS < 15          Respiratory Rate > / =26  0        Systolic BP < / =713  0        Q Sofa Score  0            Initial Sepsis Screening     Row Name 08/23/19 1315                Is the patient's history suggestive of a new or worsening infection?         Suspected source of infection  urinary tract infection  -AH        Are two or more of the following signs & symptoms of infection both present and new to the patient?           Indicate SIRS criteria  Hyperthemia > 38 3C (100 9F)  -AH        If the answer is yes to both questions, suspicion of sepsis is present          If severe sepsis is present AND tissue hypoperfusion perists in the hour after fluid resuscitation or lactate > 4, the patient meets criteria for SEPTIC SHOCK          Are any of the following organ dysfunction criteria present within 6 hours of suspected infection and SIRS criteria that are NOT considered to be chronic conditions?         Organ dysfunction  Lactate > 2 0 mmol/L  -        Date of presentation of severe sepsis          Time of presentation of severe sepsis          Tissue hypoperfusion persists in the hour after crystalloid fluid administration, evidenced, by either:          Was hypotension present within one hour of the conclusion of crystalloid fluid administration?   No  -        Date of presentation of septic shock          Time of presentation of septic shock            User Key  (r) = Recorded By, (t) = Taken By, (c) = Cosigned By    [address] Name Provider Type     Stephanie Gonsales PA-C Physician Assistant

## 2019-08-25 NOTE — PROGRESS NOTES
Progress Note - Infectious Disease   Gabriela Cloud Moscat 63 y o  male MRN: [id number]  Unit/Bed#: E4 -01 Encounter: [id number]      Impression/Plan:  1  Sepsis, POA  Patient presented on admission with hypotension along with tachycardia and low-grade fever  This is likely due to problem 2  Extensive surgical evaluations noted  CT imaging noted  Prior culture data reviewed  Current cultures pending  Continue antibiotics as below  Continue to trend fever curve/vitals   Repeat CBC/chemistry tomorrow  Additional care as per primary  Additional interventions pending clinical course     2  Complicated urinary tract infection  Patient likely has complicated urinary tract infection given history of difficulty with straight catheterization  He is now status post Solomon catheter placement  Cultures pending  Prior cultures reviewed  Imaging without findings of pyelonephritis or obstruction  Patient noted with low-grade fevers overnight now he is no longer having any urine output through catheter, suspect obstruction leading to low-grade fever despite antibiotics  Continue ceftriaxone  Re-evaluation by Urology  Continue to trend fever curve/vitals  Repeat CBC/chemistry tomorrow  Follow up pending culture data  Additional care as per primary  Will likely treat for total of 5 days pending culture data  Low threshold for repeat imaging with CT     3  Abnormal CT of the pelvis  Patient noted to have an abnormal CT mentioning air within the soft tissues within the perineum and potentially involving his penile prosthesis  His exam is not consistent with necrotizing infection  Urology and surgical evaluations appreciated  No current issues clinically at penile prosthesis  Continue antibiotics as above  Prosthesis removal as outpatient  Ongoing follow-up by urology  Additional care as per primary     4  Chronic sacral ulcers and penile implant  Ulcerations possibly contributed to abnormal CT imaging    Surgical evaluation without signs of infection at ulcer sites  Urology evaluations with the signs of infection at prosthesis  Continue local wound care as per surgery  Maintain contact isolation--prior MRSA isolation  Surgical follow-up as outpatient  Additional care as per primary     5  Paraplegia  Additional supportive care as per primary      6  History of C diff  Previously documented testing in our system  This however was documented on the patient's previous primary care visits  Currently no changes to stool output ostomy  Continue oral vancomycin prophylaxis  Monitor stool output into ostomy  Continue to trend fever curve/vitals  Additional care as per primary     Above plan discussed in detail with the patient and with nursing      ID consult service will continue to follow  Antibiotics:  Ceftriaxone 2  Oral vancomycin 2  Total antibiotic 3    24 hours events:  Patient's white blood cell count increased to 10 7  Noted to have high fever yesterday and then no further fevers overnight  Urine cultures are pending  No new imaging  Patient's other vitals are stable  Patient's other labs are stable  No other acute events noted overnight on chart review    Subjective:  Patient currently denies having any nausea, vomiting, chest pain or shortness of breath he is no longer feeling any subjective fevers  He does feel abdominal distention  On review with nursing the patient seems to have leakage of urine around his Solomon catheter and is essentially not been putting out urine in his Solomon catheter possibly since early this morning  Bladder scan with 900 cc of urine  Urology has been paged  Patient again feels no pain due to his underlying neurologic deficits      Objective:  Vitals:  Temp:  [98 4 °F (36 9 °C)-101 3 °F (38 5 °C)] 99 7 °F (37 6 °C)  HR:  [] 68  Resp:  [16-22] 18  BP: ()/(56-69) 121/61  SpO2:  [94 %-98 %] 97 %  Temp (24hrs), Av 9 °F (37 7 °C), Min:98 4 °F (36 9 °C), Max:101 3 °F (38 5 °C)  Current: Temperature: 99 7 °F (37 6 °C)    Physical Exam:   General Appearance:  Alert, interactive, nontoxic, no acute distress  Patient is pleasant and able to provide history  Throat: Oropharynx moist without lesions  Poor dentition noted  Lungs:   Clear to auscultation bilaterally; no wheezes, rhonchi or rales; respirations unlabored on room air   Heart:  RRR; no murmur, rub or gallop   Abdomen:   Soft, non-tender, distended, positive bowel sounds  Ostomy bag with liquid stools  Solomon catheter bag with 0 urine     Extremities: No clubbing, cyanosis or edema   Skin: No new rashes or lesions  No New draining wounds noted  Sacral wounds are currently packed without any acute changes  Patient does seem to have urine soaked bed sheets and leakage around his Solomon catheter  Labs, Imaging, & Other studies:   All pertinent labs and imaging studies were personally reviewed  Results from last 7 days   Lab Units 08/25/19  0519 08/24/19  0434 08/23/19  1211   WBC Thousand/uL 10 74* 8 28 6 18   HEMOGLOBIN g/dL 8 9* 8 8* 10 1*   PLATELETS Thousands/uL 327 285 283     Results from last 7 days   Lab Units 08/25/19  0519  08/23/19  1211   POTASSIUM mmol/L 3 6   < > 3 1*   CHLORIDE mmol/L 109*   < > 94*   CO2 mmol/L 22   < > 24   BUN mg/dL 7   < > 8   CREATININE mg/dL 0 44*   < > 0 70   EGFR ml/min/1 73sq m 126   < > 104   CALCIUM mg/dL 8 8   < > 8 6   AST U/L  --   --  32   ALT U/L  --   --  20   ALK PHOS U/L  --   --  166*    < > = values in this interval not displayed  Results from last 7 days   Lab Units 08/23/19  1450 08/23/19  1226 08/23/19  1211   BLOOD CULTURE   --  No Growth at 24 hrs  No Growth at 24 hrs  URINE CULTURE  Culture results to follow    --   --

## 2019-08-25 NOTE — ASSESSMENT & PLAN NOTE
· Related to urinary retention/neurogenic bladder/penile implant    · Continue ceftriaxone  · Urology recommendations reviewed  · No plan for implant removal at this time unless he deteriorates

## 2019-08-25 NOTE — PROGRESS NOTES
UROLOGY PROGRESS NOTE   Patient Identifiers: Kathy Carlos (MRN [id number])  Date of Service: 8/25/2019    Subjective:     24 HR EVENTS:   Fever last night  catheter was not draining this morning  I asked the nursing staff to flush it and they immediately had 700 mL of urine return  Patient has  no complaints  Objective:     VITALS:    Vitals:    08/25/19 1538   BP: 144/86   Pulse: 70   Resp: 18   Temp: 100 2 °F (37 9 °C)   SpO2: 97%       INS & OUTS:  I/O last 24 hours:   In: 1823 8 [I V :1823 8]  Out: 2650 [Urine:1950; Stool:700]    LABS:  Lab Results   Component Value Date    HGB 8 9 (L) 08/25/2019    HCT 28 6 (L) 08/25/2019    WBC 10 74 (H) 08/25/2019     08/25/2019   ]    Lab Results   Component Value Date     02/17/2014    K 3 6 08/25/2019     (H) 08/25/2019    CO2 22 08/25/2019    BUN 7 08/25/2019    CREATININE 0 44 (L) 08/25/2019    CALCIUM 8 8 08/25/2019    GLUCOSE 92 02/17/2014   ]    INPATIENT MEDS:    Current Facility-Administered Medications:     acetaminophen (TYLENOL) tablet 650 mg, 650 mg, Oral, Q6H PRN, Odalis Walker PA-C    cefTRIAXone (ROCEPHIN) 1,000 mg in dextrose 5 % 50 mL IVPB, 1,000 mg, Intravenous, Q24H, Ele Alicia MD, Last Rate: 100 mL/hr at 08/25/19 1321, 1,000 mg at 08/25/19 1321    enoxaparin (LOVENOX) subcutaneous injection 40 mg, 40 mg, Subcutaneous, Q24H [address], Cynthia Toledo MD, 40 mg at 08/25/19 0953    morphine (PF) 4 mg/mL injection 4 mg, 4 mg, Intravenous, Q4H PRN, Tonya Walker PA-C    oxyCODONE (ROXICODONE) immediate release tablet 20 mg, 20 mg, Oral, Q8H PRN, Cynthia Toledo MD, 20 mg at 08/24/19 2022    pantoprazole (PROTONIX) EC tablet 40 mg, 40 mg, Oral, Early Morning, Cynthia Toledo MD, 40 mg at 08/25/19 0953    vancomycin (VANCOCIN) oral solution 125 mg, 125 mg, Oral, Q12H [address], Ele Alicia MD, 125 mg at 08/25/19 0953      Physical Exam:     GEN: alert and oriented x 3    RESP: breathing comfortably with no accessory muscle use    ABD: soft, non-tender, non-distended   There is no significant swelling, erythema, or necrosis of the scrotum or penis  There is no crepitus on palpation  It is nontender but the patient does not have normal sensation  His Solomon catheter is in place and draining clear yellow urine  Assessment:     :   Nonfunctional penile prosthesis with surrounding gas  Likely UTI  Plan:     His urine culture looks like it will grow something  Continue antibiotics  I discussed with the nursing staff that he does produce mucus from his bladder and that he will need regular flushing of his catheter  Order placed  We will continue to follow

## 2019-08-25 NOTE — ASSESSMENT & PLAN NOTE
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

## 2019-08-25 NOTE — PLAN OF CARE
Problem: Potential for Falls  Goal: Patient will remain free of falls  Description  INTERVENTIONS:  - Assess patient frequently for physical needs  -  Identify cognitive and physical deficits and behaviors that affect risk of falls  -  [place] fall precautions as indicated by assessment   - Educate patient/family on patient safety including physical limitations  - Instruct patient to call for assistance with activity based on assessment  - Modify environment to reduce risk of injury  - Consider OT/PT consult to assist with strengthening/mobility  Outcome: Progressing     Problem: Prexisting or High Potential for Compromised Skin Integrity  Goal: Skin integrity is maintained or improved  Description  INTERVENTIONS:  - Identify patients at risk for skin breakdown  - Assess and monitor skin integrity  - Assess and monitor nutrition and hydration status  - Monitor labs   - Assess for incontinence   - Turn and reposition patient  - Assist with mobility/ambulation  - Relieve pressure over bony prominences  - Avoid friction and shearing  - Provide appropriate hygiene as needed including keeping skin clean and dry  - Evaluate need for skin moisturizer/barrier cream  - Collaborate with interdisciplinary team   - Patient/family teaching  - Consider wound care consult   Outcome: Progressing     Problem: Nutrition/Hydration-ADULT  Goal: Nutrient/Hydration intake appropriate for improving, restoring or maintaining nutritional needs  Description  Monitor and assess patient's nutrition/hydration status for malnutrition  Collaborate with interdisciplinary team and initiate plan and interventions as ordered  Monitor patient's weight and dietary intake as ordered or per policy  Utilize nutrition screening tool and intervene as necessary  Determine patient's food preferences and provide high-protein, high-caloric foods as appropriate       INTERVENTIONS:  - Monitor oral intake, urinary output, labs, and treatment plans  - Assess nutrition and hydration status and recommend course of action  - Evaluate amount of meals eaten  - Assist patient with eating if necessary   - Allow adequate time for meals  - Recommend/ encourage appropriate diets, oral nutritional supplements, and vitamin/mineral supplements  - Order, calculate, and assess calorie counts as needed  - Recommend, monitor, and adjust tube feedings and TPN/PPN based on assessed needs  - Assess need for intravenous fluids  - Provide specific nutrition/hydration education as appropriate  - Include patient/family/caregiver in decisions related to nutrition  Outcome: Progressing     Problem: PAIN - ADULT  Goal: Verbalizes/displays adequate comfort level or baseline comfort level  Description  Interventions:  - Encourage patient to monitor pain and request assistance  - Assess pain using appropriate pain scale  - Administer analgesics based on type and severity of pain and evaluate response  - Implement non-pharmacological measures as appropriate and evaluate response  - Consider cultural and social influences on pain and pain management  - Notify physician/advanced practitioner if interventions unsuccessful or patient reports new pain  Outcome: Progressing     Problem: INFECTION - ADULT  Goal: Absence or prevention of progression during hospitalization  Description  INTERVENTIONS:  - Assess and monitor for signs and symptoms of infection  - Monitor lab/diagnostic results  - Monitor all insertion sites, i e  indwelling lines, tubes, and drains  - Raymond appropriate cooling/warming therapies per order  - Administer medications as ordered  - Instruct and encourage patient and family to use good hand hygiene technique  - Identify and instruct in appropriate isolation precautions for identified infection/condition   Outcome: Progressing     Problem: SAFETY ADULT  Goal: Maintain or return to baseline ADL function  Description  INTERVENTIONS:  -  Assess patient's ability to carry out ADLs; assess patient's baseline for ADL function and identify physical deficits which impact ability to perform ADLs (bathing, care of mouth/teeth, toileting, grooming, dressing, etc )  - Assess/evaluate cause of self-care deficits   - Assess range of motion  - Assess patient's mobility; develop plan if impaired  - Assess patient's need for assistive devices and provide as appropriate  - Encourage maximum independence but intervene and supervise when necessary  - Involve family in performance of ADLs  - Assess for home care needs following discharge   - Consider OT consult to assist with ADL evaluation and planning for discharge  - Provide patient education as appropriate  Outcome: Progressing  Goal: Maintain or return mobility status to optimal level  Description  INTERVENTIONS:  - Assess patient's baseline mobility status (ambulation, transfers, stairs, etc )    - Identify cognitive and physical deficits and behaviors that affect mobility  - Identify mobility aids required to assist with transfers and/or ambulation (gait belt, sit-to-stand, lift, walker, cane, etc )  - [place] fall precautions as indicated by assessment  - Record patient progress and toleration of activity level on Mobility SBAR; progress patient to next Phase/Stage  - Instruct patient to call for assistance with activity based on assessment  - Consider rehabilitation consult to assist with strengthening/weightbearing, etc   Outcome: Progressing     Problem: DISCHARGE PLANNING  Goal: Discharge to home or other facility with appropriate resources  Description  INTERVENTIONS:  - Identify barriers to discharge w/patient and caregiver  - Arrange for needed discharge resources and transportation as appropriate  - Identify discharge learning needs (meds, wound care, etc )  - Refer to Case Management Department for coordinating discharge planning if the patient needs post-hospital services based on physician/advanced practitioner order or complex needs related to functional status, cognitive ability, or social support system   Outcome: Progressing     Problem: Knowledge Deficit  Goal: Patient/family/caregiver demonstrates understanding of disease process, treatment plan, medications, and discharge instructions  Description  Complete learning assessment and assess knowledge base    Interventions:  - Provide teaching at level of understanding  - Provide teaching via preferred learning methods  Outcome: Progressing     Problem: CARDIOVASCULAR - ADULT  Goal: Maintains optimal cardiac output and hemodynamic stability  Description  INTERVENTIONS:  - Monitor I/O, vital signs and rhythm  - Monitor for S/S and trends of decreased cardiac output  - Administer and titrate ordered vasoactive medications to optimize hemodynamic stability  - Assess quality of pulses, skin color and temperature  - Assess for signs of decreased coronary artery perfusion  - Instruct patient to report change in severity of symptoms  Outcome: Progressing  Goal: Absence of cardiac dysrhythmias or at baseline rhythm  Description  INTERVENTIONS:  - Continuous cardiac monitoring, vital signs, obtain 12 lead EKG if ordered  - Administer antiarrhythmic and heart rate control medications as ordered  - Monitor electrolytes and administer replacement therapy as ordered  Outcome: Progressing     Problem: GASTROINTESTINAL - ADULT  Goal: Maintains adequate nutritional intake  Description  INTERVENTIONS:  - Monitor percentage of each meal consumed  - Identify factors contributing to decreased intake, treat as appropriate  - Assist with meals as needed  - Monitor I&O, weight, and lab values if indicated  - Obtain nutrition services referral as needed  Outcome: Progressing  Goal: Establish and maintain optimal ostomy function  Description  INTERVENTIONS:  - Assess bowel function  - Encourage oral fluids to ensure adequate hydration  - Administer IV fluids if ordered to ensure adequate hydration   - Administer ordered medications as needed  - Encourage mobilization and activity  - Nutrition services referral to assist patient with appropriate food choices  - Assess stoma site  - Consider wound care consult   Outcome: Progressing     Problem: GENITOURINARY - ADULT  Goal: Maintains or returns to baseline urinary function  Description  INTERVENTIONS:  - Assess urinary function  - Encourage oral fluids to ensure adequate hydration if ordered  - Administer IV fluids as ordered to ensure adequate hydration  - Administer ordered medications as needed     Outcome: Progressing  Goal: Absence of urinary retention  Description  INTERVENTIONS:  - Assess patient's ability to void and empty bladder  - Monitor I/O  - Bladder scan as needed  - Discuss with physician/AP medications to alleviate retention as needed  - Discuss catheterization for long term situations as appropriate   Outcome: Progressing  Goal: Urinary catheter remains patent  Description  INTERVENTIONS:  - Assess patency of urinary catheter  - Follow guidelines for intermittent irrigation of non-functioning urinary catheter   Outcome: Progressing     Problem: METABOLIC, FLUID AND ELECTROLYTES - ADULT  Goal: Electrolytes maintained within normal limits  Description  INTERVENTIONS:  - Monitor labs and assess patient for signs and symptoms of electrolyte imbalances  - Administer electrolyte replacement as ordered  - Monitor response to electrolyte replacements, including repeat lab results as appropriate  - Instruct patient on fluid and nutrition as appropriate  Outcome: Progressing  Goal: Fluid balance maintained  Description  INTERVENTIONS:  - Monitor labs   - Monitor I/O and WT  - Instruct patient on fluid and nutrition as appropriate  - Assess for signs & symptoms of volume excess or deficit  Outcome: Progressing     Problem: SKIN/TISSUE INTEGRITY - ADULT  Goal: Skin integrity remains intact  Description  INTERVENTIONS  - Identify patients at risk for skin breakdown  - Assess and monitor skin integrity  - Assess and monitor nutrition and hydration status  - Monitor labs (i e  albumin)  - Assess for incontinence   - Turn and reposition patient  - Assist with mobility/ambulation  - Relieve pressure over bony prominences  - Avoid friction and shearing  - Provide appropriate hygiene as needed including keeping skin clean and dry  - Evaluate need for skin moisturizer/barrier cream  - Collaborate with interdisciplinary team (i e  Nutrition, Rehabilitation, etc )   - Patient/family teaching  Outcome: Progressing  Goal: Incision(s), wounds(s) or drain site(s) healing without S/S of infection  Description  INTERVENTIONS  - Assess and document risk factors for skin impairment   - Assess and document dressing, incision, wound bed, drain sites and surrounding tissue  - Consider nutrition services referral as needed  - Oral mucous membranes remain intact  - Provide patient/ family education  Outcome: Progressing  Goal: Oral mucous membranes remain intact  Description  INTERVENTIONS  - Assess oral mucosa and hygiene practices  - Implement preventative oral hygiene regimen  - Implement oral medicated treatments as ordered  - Initiate Nutrition services referral as needed  Outcome: Progressing     Problem: HEMATOLOGIC - ADULT  Goal: Maintains hematologic stability  Description  INTERVENTIONS  - Assess for signs and symptoms of bleeding or hemorrhage  - Monitor labs  - Administer supportive blood products/factors as ordered and appropriate  Outcome: Progressing     Problem: MUSCULOSKELETAL - ADULT  Goal: Maintain or return mobility to safest level of function  Description  INTERVENTIONS:  - Assess patient's ability to carry out ADLs; assess patient's baseline for ADL function and identify physical deficits which impact ability to perform ADLs (bathing, care of mouth/teeth, toileting, grooming, dressing, etc )  - Assess/evaluate cause of self-care deficits   - Assess range of motion  - Assess patient's mobility  - Assess patient's need for assistive devices and provide as appropriate  - Encourage maximum independence but intervene and supervise when necessary  - Involve family in performance of ADLs  - Assess for home care needs following discharge   - Consider OT consult to assist with ADL evaluation and planning for discharge  - Provide patient education as appropriate  Outcome: Progressing  Goal: Maintain proper alignment of affected body part  Description  INTERVENTIONS:  - Support, maintain and protect limb and body alignment  - Provide patient/ family with appropriate education  Outcome: Progressing

## 2019-08-25 NOTE — PROGRESS NOTES
Progress Note - Oxana Luther 1960, 63 y o  male MRN: [id number]    Unit/Bed#: E4 -01 Encounter: [id number]    Primary Care Provider: Danielle Rivera MD   Date and time admitted to hospital: 8/23/2019 11:55 AM        * Sepsis with hypotension 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, subsequent encounter  Assessment & Plan  · 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 after thorough evaluation by Urology/surgery/id  · Continue ceftriaxone  · No plan for implant removal at this time unless he deteriorates  · Solomon catheter placed    Complicated urinary tract infection  Assessment & Plan  · Related to urinary retention/neurogenic bladder/penile implant  · Continue ceftriaxone  · Urology recommendations reviewed  · No plan for implant removal at this time unless he deteriorates    Opioid use  Assessment & Plan  · PD [facility] website reviewed  Medications confirmed  GERD (gastroesophageal reflux disease)  Assessment & Plan  Switch Protonix to p o  Stage IV pressure ulcer of sacral region [facility])  Assessment & Plan  · Present on admission  · With chronic osteomyelitis  · Daily packing and wound care per General surgery  · Reposition Q 2    History of C  Difficile Infection  Assessment and Plan  On prophylactic C   Diff    VTE Pharmacologic Prophylaxis:   Pharmacologic: Enoxaparin (Lovenox)  Mechanical VTE Prophylaxis in Place: No    Patient Centered Rounds: I have performed bedside rounds with nursing staff today     Time Spent for Care: 20 minutes  More than 50% of total time spent on counseling and coordination of care as described above  Current Length of Stay: 2 day(s)    Current Patient Status: Inpatient   Certification Statement: The patient will continue to require additional inpatient hospital stay due to Complicated UTI with sepsis on admission    Discharge Plan: To be determined  Consult PT OT    Code Status: Level 1 - Full Code      Subjective:   Patient denies any pain or discomfort  He is very pleasant  No events overnight    Objective:     Vitals:   Temp (24hrs), Av °F (37 8 °C), Min:98 4 °F (36 9 °C), Max:101 3 °F (38 5 °C)    Temp:  [98 4 °F (36 9 °C)-101 3 °F (38 5 °C)] 100 1 °F (37 8 °C)  HR:  [] 88  Resp:  [16-22] 18  BP: ()/(55-69) 124/57  SpO2:  [94 %-100 %] 94 %  Body mass index is 31 75 kg/m²  Input and Output Summary (last 24 hours): Intake/Output Summary (Last 24 hours) at 2019 0919  Last data filed at 2019 2478  Gross per 24 hour   Intake 1823 75 ml   Output 1300 ml   Net 523 75 ml       Physical Exam:     Physical Exam   Constitutional: He appears well-developed  No distress  HENT:   Head: Normocephalic and atraumatic  Eyes: No scleral icterus  Right corneal opacity   Neck: No JVD present  Cardiovascular: Normal rate  Exam reveals no gallop and no friction rub  No murmur heard  Pulmonary/Chest: Effort normal  No stridor  No respiratory distress  He has no wheezes  Abdominal:   Left lower quadrant colostomy  Solomon catheter in place   Genitourinary:   Genitourinary Comments: Decreased edema of the penis and scrotum   Musculoskeletal: He exhibits deformity  Right lower extremity amputation  Contracture deformity of the upper extremity  Large stage IV sacral decubitus ulcer with packing present on admission   Skin: Skin is warm  He is not diaphoretic  Psychiatric: He has a normal mood and affect         Additional Data: Labs:    Results from last 7 days   Lab Units 08/25/19  0519  08/23/19  1211   WBC Thousand/uL 10 74*   < > 6 18   HEMOGLOBIN g/dL 8 9*   < > 10 1*   HEMATOCRIT % 28 6*   < > 33 6*   PLATELETS Thousands/uL 327   < > 283   BANDS PCT %  --   --  6   NEUTROS PCT % 79*   < >  --    LYMPHS PCT % 12*   < >  --    LYMPHO PCT %  --   --  6*   MONOS PCT % 7   < >  --    MONO PCT %  --   --  1*   EOS PCT % 1   < > 0    < > = values in this interval not displayed  Results from last 7 days   Lab Units 08/25/19  0519  08/23/19  1211   SODIUM mmol/L 140   < > 130*   POTASSIUM mmol/L 3 6   < > 3 1*   CHLORIDE mmol/L 109*   < > 94*   CO2 mmol/L 22   < > 24   BUN mg/dL 7   < > 8   CREATININE mg/dL 0 44*   < > 0 70   ANION GAP mmol/L 9   < > 12   CALCIUM mg/dL 8 8   < > 8 6   ALBUMIN g/dL  --   --  2 2*   TOTAL BILIRUBIN mg/dL  --   --  0 75   ALK PHOS U/L  --   --  166*   ALT U/L  --   --  20   AST U/L  --   --  32   GLUCOSE RANDOM mg/dL 90   < > 79    < > = values in this interval not displayed  Results from last 7 days   Lab Units 08/23/19  1211   INR  1 22*     Results from last 7 days   Lab Units 08/24/19  1705 08/24/19  0857   POC GLUCOSE mg/dl 128 95         Results from last 7 days   Lab Units 08/23/19  1445 08/23/19  1211   LACTIC ACID mmol/L 1 3 2 4*           * I Have Reviewed All Lab Data Listed Above  * Additional Pertinent Lab Tests Reviewed: All Labs Within Last 24 Hours Reviewed    Imaging:    Imaging Reports Reviewed Today Include:  None      Recent Cultures (last 7 days):     Results from last 7 days   Lab Units 08/23/19  1450 08/23/19  1226 08/23/19  1211   BLOOD CULTURE   --  No Growth at 24 hrs  No Growth at 24 hrs  URINE CULTURE  Culture results to follow    --   --        Last 24 Hours Medication List:     Current Facility-Administered Medications:  acetaminophen 650 mg Oral Q6H PRN Odalis Smudde, PA-C    cefTRIAXone 1,000 mg Intravenous Q24H Cyndie De La Fuente MD Last Rate: 1,000 mg (08/24/19 3656) enoxaparin 40 mg Subcutaneous Q24H [address] Flip Mora MD    morphine injection 4 mg Intravenous Q4H PRN Ann Dubon PA-C    oxyCODONE 20 mg Oral Q8H PRN Flip Mora MD    pantoprazole 40 mg Intravenous Q24H [address] Flip Mora MD    sodium chloride 0 9 % with KCl 40 mEq/L 75 mL/hr Intravenous Continuous Odalis Walker PA-C Last Rate: 75 mL/hr (08/25/19 0528)   vancomycin 125 mg Oral Q12H Sinai Cruz MD         Today, Patient Was Seen By: Flip Mora MD    ** Please Note: Dictation voice to text software may have been used in the creation of this document   **

## 2019-08-26 LAB
ANION GAP SERPL CALCULATED.3IONS-SCNC: 8 MMOL/L (ref 4–13)
BASOPHILS # BLD AUTO: 0.04 THOUSANDS/ΜL (ref 0–0.1)
BASOPHILS NFR BLD AUTO: 0 % (ref 0–1)
BUN SERPL-MCNC: 8 MG/DL (ref 5–25)
CALCIUM SERPL-MCNC: 8.9 MG/DL (ref 8.3–10.1)
CHLORIDE SERPL-SCNC: 105 MMOL/L (ref 100–108)
CO2 SERPL-SCNC: 24 MMOL/L (ref 21–32)
CREAT SERPL-MCNC: 0.52 MG/DL (ref 0.6–1.3)
EOSINOPHIL # BLD AUTO: 0.12 THOUSAND/ΜL (ref 0–0.61)
EOSINOPHIL NFR BLD AUTO: 1 % (ref 0–6)
ERYTHROCYTE [DISTWIDTH] IN BLOOD BY AUTOMATED COUNT: 17.9 % (ref 11.6–15.1)
GFR SERPL CREATININE-BSD FRML MDRD: 118 ML/MIN/1.73SQ M
GLUCOSE SERPL-MCNC: 88 MG/DL (ref 65–140)
HCT VFR BLD AUTO: 29.2 % (ref 36.5–49.3)
HGB BLD-MCNC: 9.2 G/DL (ref 12–17)
IMM GRANULOCYTES # BLD AUTO: 0.08 THOUSAND/UL (ref 0–0.2)
IMM GRANULOCYTES NFR BLD AUTO: 1 % (ref 0–2)
LYMPHOCYTES # BLD AUTO: 1.72 THOUSANDS/ΜL (ref 0.6–4.47)
LYMPHOCYTES NFR BLD AUTO: 17 % (ref 14–44)
MCH RBC QN AUTO: 25.1 PG (ref 26.8–34.3)
MCHC RBC AUTO-ENTMCNC: 31.5 G/DL (ref 31.4–37.4)
MCV RBC AUTO: 80 FL (ref 82–98)
MONOCYTES # BLD AUTO: 0.63 THOUSAND/ΜL (ref 0.17–1.22)
MONOCYTES NFR BLD AUTO: 6 % (ref 4–12)
NEUTROPHILS # BLD AUTO: 7.48 THOUSANDS/ΜL (ref 1.85–7.62)
NEUTS SEG NFR BLD AUTO: 75 % (ref 43–75)
NRBC BLD AUTO-RTO: 0 /100 WBCS
PLATELET # BLD AUTO: 385 THOUSANDS/UL (ref 149–390)
PMV BLD AUTO: 9.8 FL (ref 8.9–12.7)
POTASSIUM SERPL-SCNC: 3 MMOL/L (ref 3.5–5.3)
RBC # BLD AUTO: 3.66 MILLION/UL (ref 3.88–5.62)
SODIUM SERPL-SCNC: 137 MMOL/L (ref 136–145)
WBC # BLD AUTO: 10.07 THOUSAND/UL (ref 4.31–10.16)

## 2019-08-26 PROCEDURE — 99232 SBSQ HOSP IP/OBS MODERATE 35: CPT | Performed by: PHYSICIAN ASSISTANT

## 2019-08-26 PROCEDURE — 99233 SBSQ HOSP IP/OBS HIGH 50: CPT | Performed by: INTERNAL MEDICINE

## 2019-08-26 PROCEDURE — 80048 BASIC METABOLIC PNL TOTAL CA: CPT | Performed by: INTERNAL MEDICINE

## 2019-08-26 PROCEDURE — 99232 SBSQ HOSP IP/OBS MODERATE 35: CPT | Performed by: HOSPITALIST

## 2019-08-26 PROCEDURE — 85025 COMPLETE CBC W/AUTO DIFF WBC: CPT | Performed by: INTERNAL MEDICINE

## 2019-08-26 RX ORDER — POTASSIUM CHLORIDE 20 MEQ/1
40 TABLET, EXTENDED RELEASE ORAL 2 TIMES DAILY
Status: COMPLETED | OUTPATIENT
Start: 2019-08-26 | End: 2019-08-28

## 2019-08-26 RX ORDER — CEFAZOLIN SODIUM 2 G/50ML
2000 SOLUTION INTRAVENOUS EVERY 8 HOURS
Status: DISCONTINUED | OUTPATIENT
Start: 2019-08-26 | End: 2019-08-28

## 2019-08-26 RX ADMIN — VANCOMYCIN HYDROCHLORIDE 125 MG: 5 INJECTION, POWDER, LYOPHILIZED, FOR SOLUTION INTRAVENOUS at 20:33

## 2019-08-26 RX ADMIN — CEFAZOLIN SODIUM 2000 MG: 2 SOLUTION INTRAVENOUS at 09:14

## 2019-08-26 RX ADMIN — OXYCODONE HYDROCHLORIDE 20 MG: 10 TABLET ORAL at 09:32

## 2019-08-26 RX ADMIN — CEFAZOLIN SODIUM 2000 MG: 2 SOLUTION INTRAVENOUS at 18:01

## 2019-08-26 RX ADMIN — OXYCODONE HYDROCHLORIDE 20 MG: 10 TABLET ORAL at 19:54

## 2019-08-26 RX ADMIN — PANTOPRAZOLE SODIUM 40 MG: 40 TABLET, DELAYED RELEASE ORAL at 06:00

## 2019-08-26 RX ADMIN — ENOXAPARIN SODIUM 40 MG: 40 INJECTION SUBCUTANEOUS at 09:12

## 2019-08-26 RX ADMIN — POTASSIUM CHLORIDE 40 MEQ: 1500 TABLET, EXTENDED RELEASE ORAL at 18:01

## 2019-08-26 RX ADMIN — VANCOMYCIN HYDROCHLORIDE 125 MG: 5 INJECTION, POWDER, LYOPHILIZED, FOR SOLUTION INTRAVENOUS at 09:11

## 2019-08-26 NOTE — CONSULTS
Consult Note - Wound   [place] Ch[place] Farooqcat 63 y o  male MRN: [id number]  Unit/Bed#: E4 -01 Encounter: [id number]      History and Present Illness:  63year old male presented to the hospital with fever and inability to self catheterize  Patient's history significant for neurogenic bladder, paraplegia, colostomy, sacral wound with chronic osteomyelitis, right high AKA  Patient states he has visiting nurses three times per week for wound care  He has motorized wheelchair with roho cushion and air mattress  Assessment Findings:   Patient seen per physician consult  Denies pain  Able to turn in bed independently for assessment  Nutrition team following, supplements ordered  Patient's colostomy leaking at time of assessment with stool contamination of sacral wound  1 piece leaking pouching system removed, replaced with 2 piece 2 3/4 moldable convatec pouching system per patient's request (so he can release gas as needed)  Scar tissue to midline abdomen  Colostomy stoma with pink, round, moist, budded stoma  Peristomal skin and mucocutaneous junction intact (hair clipped prior to pouch application)  Brown/green loose effluent from stoma  Right buttock intact  Left heel intact  Skin folds intact  Scrotum/penis tender--urology following  1   Left lateral buttock partial thickness wounds x 3 (pressure injury vs from tape removal) present on admission--patient states he is aware of open areas to left lateral buttocks, states "they come and go from tape removal "  However, wounds are round/oval and resemble pressure injury more than skin tear from tape  2   Present on admission stage 4 pressure injury to left medial buttocks--wound bed with pale pink, shiny wound bed  Probes to bone  Tunneling and undermining present  Difficult to assess drainage due to stool contamination from colostomy leakage  Wound cleansed/irrigated thoroughly with normal saline  [place]by-wound fragile, hyperpigmented        No evidence of wound infection  See flowsheet for wound details  Plan:   1-Apply Allevyn Life foam dressing to left heel for prevention  Lupillo with P   Peel back at least daily for skin assessment and re-apply  Change dressing every 3 days and PRN  2-Elevate left heel to offload pressure  3-EHOB offloading cushion in chair when out of bed  4-Moisturize skin daily with skin nourishing cream   5-Turn/reposition q2h or when medically stable for pressure re-distribution on skin--use positioning wedges  6-P500 low air-loss mattress  7-Left medial buttock/sacral wound--saline wet-to-dry daily per surgical team   8-Left lateral buttock wounds--cleanse soap and water, pat dry  Apply Allevyn Life foam dressing to left lateral buttock wounds  Lupillo with T   Change dressing every other day and PRN with soilage  9-Ostomy care--empty pouch when 1/3 full  Check pouch every 2-4 hours for gas, release as needed  Use 2 piece 2 3/4 (blue label) pouching system  10-Wound care team will follow  Plan of care reviewed with primary RN  Patient should be discharged with [facility] for wound care      Wound 08/23/19 Pressure Injury Buttocks Medial;Left (Active)   Wound Image   8/26/2019 11:31 AM   Wound Description Pink;Clean;Pale 8/26/2019 11:31 AM   Staging Stage IV 8/26/2019 11:31 AM   [place]helby-wound Assessment Hyperpigmented 8/26/2019 11:31 AM   Wound Length (cm) 7 2 cm 8/26/2019 11:31 AM   Wound Width (cm) 3 cm 8/26/2019 11:31 AM   Wound Depth (cm) 2 4 8/26/2019 11:31 AM   Calculated Wound Area (cm^2) 21 6 cm^2 8/26/2019 11:31 AM   Calculated Wound Volume (cm^3) 51 84 cm^3 8/26/2019 11:31 AM   Tunneling 6 5 cm 8/26/2019 11:31 AM   Tunneling in depth located at 11-12 o'clock 8/26/2019 11:31 AM   Undermining is depth extending from from 9-4 o'clock 8/26/2019 11:31 AM   Drainage Amount DISHA 8/26/2019  9:33 AM   Drainage Description DISHA 8/26/2019  9:33 AM   Non-staged Wound Description Full thickness 8/26/2019 11:31 AM   Treatments Cleansed;Irrigation with NSS;Site care 8/26/2019 11:31 AM   Dressing ABD;Calcium Alginate;Gauze 8/26/2019 11:31 AM   Wound packed? Yes 8/25/2019  9:01 PM   Packing- # removed 1 8/26/2019 11:31 AM   Packing- # inserted 0 8/26/2019 11:31 AM   Dressing Changed Changed 8/26/2019 11:31 AM   Patient Tolerance Tolerated well 8/26/2019 11:31 AM   Dressing Status Clean;Dry; Intact 8/26/2019 11:31 AM       Wound 08/26/19 Pressure Injury Buttocks Left;Proximal;Lateral (Active)   Wound Image   8/26/2019 11:35 AM   Wound Description Clean;Pale;Pink 8/26/2019 11:35 AM   Staging Stage II 8/26/2019 11:35 AM   Shelby-wound Assessment Fragile;Erythema 8/26/2019 11:35 AM   Wound Length (cm) 0 8 cm 8/26/2019 11:35 AM   Wound Width (cm) 2 cm 8/26/2019 11:35 AM   Wound Depth (cm) 0 1 8/26/2019 11:35 AM   Calculated Wound Area (cm^2) 1 6 cm^2 8/26/2019 11:35 AM   Calculated Wound Volume (cm^3) 0 16 cm^3 8/26/2019 11:35 AM   Tunneling 0 cm 8/26/2019 11:35 AM   Undermining 0 8/26/2019 11:35 AM   Drainage Amount Scant 8/26/2019 11:35 AM   Drainage Description Serosanguineous 8/26/2019 11:35 AM   Non-staged Wound Description Partial thickness 8/26/2019 11:35 AM   Treatments Cleansed 8/26/2019 11:35 AM   Dressing Foam, Silicon (eg  Allevyn, etc) 8/26/2019 11:35 AM   Dressing Changed New 8/26/2019 11:35 AM   Patient Tolerance Tolerated well 8/26/2019 11:35 AM   Dressing Status Clean;Dry; Intact 8/26/2019 11:35 AM       Wound 08/26/19 Pressure Injury Buttocks Left;Mid;Lateral (Active)   Wound Description Pale;Pink 8/26/2019 11:35 AM   Staging Stage II 8/26/2019 11:35 AM   Shelby-wound Assessment Fragile;Erythema 8/26/2019 11:35 AM   Wound Length (cm) 0 3 cm 8/26/2019 11:35 AM   Wound Width (cm) 0 3 cm 8/26/2019 11:35 AM   Wound Depth (cm) 0 1 8/26/2019 11:35 AM   Calculated Wound Area (cm^2) 0 09 cm^2 8/26/2019 11:35 AM   Calculated Wound Volume (cm^3) 0 01 cm^3 8/26/2019 11:35 AM   Tunneling 0 cm 8/26/2019 11:35 AM   Undermining 0 8/26/2019 11:35 AM Drainage Amount None 8/26/2019 11:35 AM   Non-staged Wound Description Partial thickness 8/26/2019 11:35 AM   Treatments Cleansed 8/26/2019 11:35 AM   Dressing Foam, Silicon (eg  Allevyn, etc) 8/26/2019 11:35 AM   Dressing Changed New 8/26/2019 11:35 AM   Patient Tolerance Tolerated well 8/26/2019 11:35 AM   Dressing Status Clean;Dry; Intact 8/26/2019 11:35 AM       Wound 08/26/19 Pressure Injury Buttocks Left;Distal;Lateral (Active)   Wound Description Pale;Pink 8/26/2019 11:35 AM   Staging Stage II 8/26/2019 11:35 AM   Shelby-wound Assessment Erythema;Fragile 8/26/2019 11:35 AM   Wound Length (cm) 0 5 cm 8/26/2019 11:35 AM   Wound Width (cm) 1 5 cm 8/26/2019 11:35 AM   Wound Depth (cm) 0 1 8/26/2019 11:35 AM   Calculated Wound Area (cm^2) 0 75 cm^2 8/26/2019 11:35 AM   Calculated Wound Volume (cm^3) 0 08 cm^3 8/26/2019 11:35 AM   Tunneling 0 cm 8/26/2019 11:35 AM   Undermining 0 8/26/2019 11:35 AM   Drainage Amount None 8/26/2019 11:35 AM   Non-staged Wound Description Partial thickness 8/26/2019 11:35 AM   Treatments Cleansed 8/26/2019 11:35 AM   Dressing Foam, Silicon (eg  Allevyn, etc) 8/26/2019 11:35 AM   Dressing Changed New 8/26/2019 11:35 AM   Patient Tolerance Tolerated well 8/26/2019 11:35 AM   Dressing Status Clean;Dry; Intact 8/26/2019 11:35 AM     Coral CLINTONN, RN, Leonel Ruiz

## 2019-08-26 NOTE — PLAN OF CARE
Problem: Potential for Falls  Goal: Patient will remain free of falls  Description  INTERVENTIONS:  - Assess patient frequently for physical needs  -  Identify cognitive and physical deficits and behaviors that affect risk of falls  -  [place] fall precautions as indicated by assessment   - Educate patient/family on patient safety including physical limitations  - Instruct patient to call for assistance with activity based on assessment  - Modify environment to reduce risk of injury  - Consider OT/PT consult to assist with strengthening/mobility  Outcome: Progressing     Problem: Prexisting or High Potential for Compromised Skin Integrity  Goal: Skin integrity is maintained or improved  Description  INTERVENTIONS:  - Identify patients at risk for skin breakdown  - Assess and monitor skin integrity  - Assess and monitor nutrition and hydration status  - Monitor labs   - Assess for incontinence   - Turn and reposition patient  - Assist with mobility/ambulation  - Relieve pressure over bony prominences  - Avoid friction and shearing  - Provide appropriate hygiene as needed including keeping skin clean and dry  - Evaluate need for skin moisturizer/barrier cream  - Collaborate with interdisciplinary team   - Patient/family teaching  - Consider wound care consult   Outcome: Progressing     Problem: Nutrition/Hydration-ADULT  Goal: Nutrient/Hydration intake appropriate for improving, restoring or maintaining nutritional needs  Description  Monitor and assess patient's nutrition/hydration status for malnutrition  Collaborate with interdisciplinary team and initiate plan and interventions as ordered  Monitor patient's weight and dietary intake as ordered or per policy  Utilize nutrition screening tool and intervene as necessary  Determine patient's food preferences and provide high-protein, high-caloric foods as appropriate       INTERVENTIONS:  - Monitor oral intake, urinary output, labs, and treatment plans  - Assess nutrition and hydration status and recommend course of action  - Evaluate amount of meals eaten  - Assist patient with eating if necessary   - Allow adequate time for meals  - Recommend/ encourage appropriate diets, oral nutritional supplements, and vitamin/mineral supplements  - Order, calculate, and assess calorie counts as needed  - Recommend, monitor, and adjust tube feedings and TPN/PPN based on assessed needs  - Assess need for intravenous fluids  - Provide specific nutrition/hydration education as appropriate  - Include patient/family/caregiver in decisions related to nutrition  Outcome: Progressing     Problem: PAIN - ADULT  Goal: Verbalizes/displays adequate comfort level or baseline comfort level  Description  Interventions:  - Encourage patient to monitor pain and request assistance  - Assess pain using appropriate pain scale  - Administer analgesics based on type and severity of pain and evaluate response  - Implement non-pharmacological measures as appropriate and evaluate response  - Consider cultural and social influences on pain and pain management  - Notify physician/advanced practitioner if interventions unsuccessful or patient reports new pain  Outcome: Progressing     Problem: INFECTION - ADULT  Goal: Absence or prevention of progression during hospitalization  Description  INTERVENTIONS:  - Assess and monitor for signs and symptoms of infection  - Monitor lab/diagnostic results  - Monitor all insertion sites, i e  indwelling lines, tubes, and drains  - [place] appropriate cooling/warming therapies per order  - Administer medications as ordered  - Instruct and encourage patient and family to use good hand hygiene technique  - Identify and instruct in appropriate isolation precautions for identified infection/condition   Outcome: Progressing     Problem: SAFETY ADULT  Goal: Maintain or return to baseline ADL function  Description  INTERVENTIONS:  -  Assess patient's ability to carry out ADLs; assess patient's baseline for ADL function and identify physical deficits which impact ability to perform ADLs (bathing, care of mouth/teeth, toileting, grooming, dressing, etc )  - Assess/evaluate cause of self-care deficits   - Assess range of motion  - Assess patient's mobility; develop plan if impaired  - Assess patient's need for assistive devices and provide as appropriate  - Encourage maximum independence but intervene and supervise when necessary  - Involve family in performance of ADLs  - Assess for home care needs following discharge   - Consider OT consult to assist with ADL evaluation and planning for discharge  - Provide patient education as appropriate  Outcome: Progressing  Goal: Maintain or return mobility status to optimal level  Description  INTERVENTIONS:  - Assess patient's baseline mobility status (ambulation, transfers, stairs, etc )    - Identify cognitive and physical deficits and behaviors that affect mobility  - Identify mobility aids required to assist with transfers and/or ambulation (gait belt, sit-to-stand, lift, walker, cane, etc )  - [place] fall precautions as indicated by assessment  - Record patient progress and toleration of activity level on Mobility SBAR; progress patient to next Phase/Stage  - Instruct patient to call for assistance with activity based on assessment  - Consider rehabilitation consult to assist with strengthening/weightbearing, etc   Outcome: Progressing     Problem: DISCHARGE PLANNING  Goal: Discharge to home or other facility with appropriate resources  Description  INTERVENTIONS:  - Identify barriers to discharge w/patient and caregiver  - Arrange for needed discharge resources and transportation as appropriate  - Identify discharge learning needs (meds, wound care, etc )  - Refer to Case Management Department for coordinating discharge planning if the patient needs post-hospital services based on physician/advanced practitioner order or complex needs related to functional status, cognitive ability, or social support system   Outcome: Progressing     Problem: Knowledge Deficit  Goal: Patient/family/caregiver demonstrates understanding of disease process, treatment plan, medications, and discharge instructions  Description  Complete learning assessment and assess knowledge base    Interventions:  - Provide teaching at level of understanding  - Provide teaching via preferred learning methods  Outcome: Progressing     Problem: CARDIOVASCULAR - ADULT  Goal: Maintains optimal cardiac output and hemodynamic stability  Description  INTERVENTIONS:  - Monitor I/O, vital signs and rhythm  - Monitor for S/S and trends of decreased cardiac output  - Administer and titrate ordered vasoactive medications to optimize hemodynamic stability  - Assess quality of pulses, skin color and temperature  - Assess for signs of decreased coronary artery perfusion  - Instruct patient to report change in severity of symptoms  Outcome: Progressing  Goal: Absence of cardiac dysrhythmias or at baseline rhythm  Description  INTERVENTIONS:  - Continuous cardiac monitoring, vital signs, obtain 12 lead EKG if ordered  - Administer antiarrhythmic and heart rate control medications as ordered  - Monitor electrolytes and administer replacement therapy as ordered  Outcome: Progressing     Problem: GASTROINTESTINAL - ADULT  Goal: Maintains adequate nutritional intake  Description  INTERVENTIONS:  - Monitor percentage of each meal consumed  - Identify factors contributing to decreased intake, treat as appropriate  - Assist with meals as needed  - Monitor I&O, weight, and lab values if indicated  - Obtain nutrition services referral as needed  Outcome: Progressing  Goal: Establish and maintain optimal ostomy function  Description  INTERVENTIONS:  - Assess bowel function  - Encourage oral fluids to ensure adequate hydration  - Administer IV fluids if ordered to ensure adequate hydration   - Administer ordered medications as needed  - Encourage mobilization and activity  - Nutrition services referral to assist patient with appropriate food choices  - Assess stoma site  - Consider wound care consult   Outcome: Progressing     Problem: GENITOURINARY - ADULT  Goal: Maintains or returns to baseline urinary function  Description  INTERVENTIONS:  - Assess urinary function  - Encourage oral fluids to ensure adequate hydration if ordered  - Administer IV fluids as ordered to ensure adequate hydration  - Administer ordered medications as needed     Outcome: Progressing  Goal: Absence of urinary retention  Description  INTERVENTIONS:  - Assess patient's ability to void and empty bladder  - Monitor I/O  - Bladder scan as needed  - Discuss with physician/AP medications to alleviate retention as needed  - Discuss catheterization for long term situations as appropriate   Outcome: Progressing  Goal: Urinary catheter remains patent  Description  INTERVENTIONS:  - Assess patency of urinary catheter  - Follow guidelines for intermittent irrigation of non-functioning urinary catheter   Outcome: Progressing     Problem: METABOLIC, FLUID AND ELECTROLYTES - ADULT  Goal: Electrolytes maintained within normal limits  Description  INTERVENTIONS:  - Monitor labs and assess patient for signs and symptoms of electrolyte imbalances  - Administer electrolyte replacement as ordered  - Monitor response to electrolyte replacements, including repeat lab results as appropriate  - Instruct patient on fluid and nutrition as appropriate  Outcome: Progressing  Goal: Fluid balance maintained  Description  INTERVENTIONS:  - Monitor labs   - Monitor I/O and WT  - Instruct patient on fluid and nutrition as appropriate  - Assess for signs & symptoms of volume excess or deficit  Outcome: Progressing     Problem: SKIN/TISSUE INTEGRITY - ADULT  Goal: Skin integrity remains intact  Description  INTERVENTIONS  - Identify patients at risk for skin breakdown  - Assess and monitor skin integrity  - Assess and monitor nutrition and hydration status  - Monitor labs (i e  albumin)  - Assess for incontinence   - Turn and reposition patient  - Assist with mobility/ambulation  - Relieve pressure over bony prominences  - Avoid friction and shearing  - Provide appropriate hygiene as needed including keeping skin clean and dry  - Evaluate need for skin moisturizer/barrier cream  - Collaborate with interdisciplinary team (i e  Nutrition, Rehabilitation, etc )   - Patient/family teaching  Outcome: Progressing  Goal: Incision(s), wounds(s) or drain site(s) healing without S/S of infection  Description  INTERVENTIONS  - Assess and document risk factors for skin impairment   - Assess and document dressing, incision, wound bed, drain sites and surrounding tissue  - Consider nutrition services referral as needed  - Oral mucous membranes remain intact  - Provide patient/ family education  Outcome: Progressing  Goal: Oral mucous membranes remain intact  Description  INTERVENTIONS  - Assess oral mucosa and hygiene practices  - Implement preventative oral hygiene regimen  - Implement oral medicated treatments as ordered  - Initiate Nutrition services referral as needed  Outcome: Progressing     Problem: HEMATOLOGIC - ADULT  Goal: Maintains hematologic stability  Description  INTERVENTIONS  - Assess for signs and symptoms of bleeding or hemorrhage  - Monitor labs  - Administer supportive blood products/factors as ordered and appropriate  Outcome: Progressing     Problem: MUSCULOSKELETAL - ADULT  Goal: Maintain or return mobility to safest level of function  Description  INTERVENTIONS:  - Assess patient's ability to carry out ADLs; assess patient's baseline for ADL function and identify physical deficits which impact ability to perform ADLs (bathing, care of mouth/teeth, toileting, grooming, dressing, etc )  - Assess/evaluate cause of self-care deficits   - Assess range of motion  - Assess patient's mobility  - Assess patient's need for assistive devices and provide as appropriate  - Encourage maximum independence but intervene and supervise when necessary  - Involve family in performance of ADLs  - Assess for home care needs following discharge   - Consider OT consult to assist with ADL evaluation and planning for discharge  - Provide patient education as appropriate  Outcome: Progressing  Goal: Maintain proper alignment of affected body part  Description  INTERVENTIONS:  - Support, maintain and protect limb and body alignment  - Provide patient/ family with appropriate education  Outcome: Progressing

## 2019-08-26 NOTE — DISCHARGE INSTR - OTHER ORDERS
Wound Care:  1-Left lateral partial thickness buttock wounds--cleanse soap and water, pat dry  Apply Allevyn Life foam dressing to left lateral buttock wounds  Change dressing three times per week and PRN with soilage  2-Elevate left heel to offload pressure  3-Moisturize skin daily with skin nourishing cream   4-Turn/reposition every 2 hours for pressure re-distribution on skin--use positioning wedges  5-Low air-loss mattress

## 2019-08-26 NOTE — NURSING NOTE
Colostomy bag exploded  Site cleaned, bag changed  Catheter flushed w/ 60ml sterile water per order  60ml put in, 60ml foggy white liquid with white strands returned  Stool present on IV dressing  Dressing changed  Wounds cleaned and dressed by wound care

## 2019-08-26 NOTE — PROGRESS NOTES
Progress Note - Infectious Disease   Adam Zhang Moscat 63 y o  male MRN: [id number]  Unit/Bed#: E4 -01 Encounter: [id number]      Impression/Plan:  1  Sepsis  POA   Hypotension, tachycardia, and low-grade fever  Likely secondary to complicated E coli urinary tract infection  Blood cultures are negative after 48 hours  Fortunately the patient is clinically stable and nontoxic  Today he is afebrile and his WBC count is trending down   -antibiotics as below  -monitor CBC and BMP  -follow up blood cultures  -monitor vitals  -supportive care     2  Complicated E coli urinary tract infection   Patient with history of difficulty with straight catheterization  Uri Cote is now status post Solomon catheter placement  He regularly produces bladder mucus and must have the Solomon catheter flushed regularly to ensure it continues to drain  Fortunately there is no evidence of pyelonephritis on his imaging  He has been receiving IV ceftriaxone and is tolerating without difficulty  Given his final culture results I will deescalate the patient to IV cefazolin today   -stop IV ceftriaxone  -start IV cefazolin, 2 g q 8 hours  -anticipate transition to oral antibiotic regimen tomorrow if patient continues to clinically improve  -monitor CBC and BMP  -monitor vitals  -serial Solomon exams and care  -routine Solomon flushing per Urology  -continue close follow-up with Urology     3  Abnormal CT of the pelvis   Patient noted to have an abnormal CT showing air within the soft tissues within the perineum and potentially involving his penile prosthesis   His exam is not consistent with necrotizing infection  Patient has clinically improved since placement of Solomon and initiation of antibiotics   Urology and surgical evaluations appreciated   -antibiotics as above  -recommend eventual penile prosthesis removal  -continue close follow-up with Urology     4   Chronic sacral/buttock ulcers  Surgical evaluation without signs of infection at ulcer sites  He is now following closely with wound management   -serial sacral and buttock exams  -continue close follow-up with wound management     5  Paraplegia   Additional supportive care as per primary     6  History of C diff   Patient will require PO vancomycin prophylaxis while on additional antibiotics above   -continue p o  vancomycin prophylaxis  -monitor GI symptoms  -monitor stool output in ostomy pouch    Antibiotics:  Ceftriaxone - stop  Cefazolin 1  PO vancomycin 3  Antibiotics 4    Subjective:  Patient reports he is feeling well today  States his Solomon catheter has been much better since it is being flushed on a regular basis  He has no suprapubic or penile pain today  He denies fever, chills, sweats; no nausea, abdominal pain, or vomiting; reports he has seen a slight increase in his stool output in the past 24 hours; no cough, shortness of breath, or chest pain  No new symptoms  Objective:  Vitals:  Temp:  [97 8 °F (36 6 °C)-100 2 °F (37 9 °C)] 97 8 °F (36 6 °C)  HR:  [] 101  Resp:  [18] 18  BP: ()/(65-86) 104/66  SpO2:  [95 %-97 %] 97 %  Temp (24hrs), Av 8 °F (37 1 °C), Min:97 8 °F (36 6 °C), Max:100 2 °F (37 9 °C)  Current: Temperature: 97 8 °F (36 6 °C)    Physical Exam:   General Appearance:  Alert, interactive, nontoxic, no acute distress  Throat: Oropharynx moist without lesions  Poor dentition  Lungs:   Clear to auscultation bilaterally; no wheezes, rhonchi or rales; respirations unlabored   Heart:  RRR; no murmur, rub or gallop   Abdomen:   Soft, non-tender, non-distended, positive bowel sounds throughout  Ostomy pouch intact, stool is liquid and brown  Genitourinary: Fully intact, urine output is small, yellow, no sediment   Extremities: No clubbing or cyanosis, no edema   Skin: No new rashes or lesions noted on exposed skin  Foam dressings intact to the L buttock  Dressing intact to the sacrum has no breakthrough drainage    Foam dressing intact on patient's left heel  Labs, Imaging, & Other studies:   All pertinent labs and imaging studies were personally reviewed  Results from last 7 days   Lab Units 08/26/19  0540 08/25/19  0519 08/24/19  0434   WBC Thousand/uL 10 07 10 74* 8 28   HEMOGLOBIN g/dL 9 2* 8 9* 8 8*   PLATELETS Thousands/uL 385 327 285     Results from last 7 days   Lab Units 08/26/19  0540  08/23/19  1211   POTASSIUM mmol/L 3 0*   < > 3 1*   CHLORIDE mmol/L 105   < > 94*   CO2 mmol/L 24   < > 24   BUN mg/dL 8   < > 8   CREATININE mg/dL 0 52*   < > 0 70   EGFR ml/min/1 73sq m 118   < > 104   CALCIUM mg/dL 8 9   < > 8 6   AST U/L  --   --  32   ALT U/L  --   --  20   ALK PHOS U/L  --   --  166*    < > = values in this interval not displayed  Results from last 7 days   Lab Units 08/23/19  1450 08/23/19  1226 08/23/19  1211   BLOOD CULTURE   --  No Growth at 48 hrs  No Growth at 48 hrs     URINE CULTURE  30,000-39,000 cfu/ml Escherichia coli*  --   --

## 2019-08-26 NOTE — PLAN OF CARE
Problem: Potential for Falls  Goal: Patient will remain free of falls  Description  INTERVENTIONS:  - Assess patient frequently for physical needs  -  Identify cognitive and physical deficits and behaviors that affect risk of falls  -  [place] fall precautions as indicated by assessment   - Educate patient/family on patient safety including physical limitations  - Instruct patient to call for assistance with activity based on assessment  - Modify environment to reduce risk of injury  - Consider OT/PT consult to assist with strengthening/mobility  Outcome: Progressing     Problem: Prexisting or High Potential for Compromised Skin Integrity  Goal: Skin integrity is maintained or improved  Description  INTERVENTIONS:  - Identify patients at risk for skin breakdown  - Assess and monitor skin integrity  - Assess and monitor nutrition and hydration status  - Monitor labs   - Assess for incontinence   - Turn and reposition patient  - Assist with mobility/ambulation  - Relieve pressure over bony prominences  - Avoid friction and shearing  - Provide appropriate hygiene as needed including keeping skin clean and dry  - Evaluate need for skin moisturizer/barrier cream  - Collaborate with interdisciplinary team   - Patient/family teaching  - Consider wound care consult   Outcome: Progressing     Problem: Nutrition/Hydration-ADULT  Goal: Nutrient/Hydration intake appropriate for improving, restoring or maintaining nutritional needs  Description  Monitor and assess patient's nutrition/hydration status for malnutrition  Collaborate with interdisciplinary team and initiate plan and interventions as ordered  Monitor patient's weight and dietary intake as ordered or per policy  Utilize nutrition screening tool and intervene as necessary  Determine patient's food preferences and provide high-protein, high-caloric foods as appropriate       INTERVENTIONS:  - Monitor oral intake, urinary output, labs, and treatment plans  - Assess nutrition and hydration status and recommend course of action  - Evaluate amount of meals eaten  - Assist patient with eating if necessary   - Allow adequate time for meals  - Recommend/ encourage appropriate diets, oral nutritional supplements, and vitamin/mineral supplements  - Order, calculate, and assess calorie counts as needed  - Recommend, monitor, and adjust tube feedings and TPN/PPN based on assessed needs  - Assess need for intravenous fluids  - Provide specific nutrition/hydration education as appropriate  - Include patient/family/caregiver in decisions related to nutrition  Outcome: Progressing     Problem: PAIN - ADULT  Goal: Verbalizes/displays adequate comfort level or baseline comfort level  Description  Interventions:  - Encourage patient to monitor pain and request assistance  - Assess pain using appropriate pain scale  - Administer analgesics based on type and severity of pain and evaluate response  - Implement non-pharmacological measures as appropriate and evaluate response  - Consider cultural and social influences on pain and pain management  - Notify physician/advanced practitioner if interventions unsuccessful or patient reports new pain  Outcome: Progressing     Problem: INFECTION - ADULT  Goal: Absence or prevention of progression during hospitalization  Description  INTERVENTIONS:  - Assess and monitor for signs and symptoms of infection  - Monitor lab/diagnostic results  - Monitor all insertion sites, i e  indwelling lines, tubes, and drains  - [place] appropriate cooling/warming therapies per order  - Administer medications as ordered  - Instruct and encourage patient and family to use good hand hygiene technique  - Identify and instruct in appropriate isolation precautions for identified infection/condition   Outcome: Progressing     Problem: SAFETY ADULT  Goal: Maintain or return to baseline ADL function  Description  INTERVENTIONS:  -  Assess patient's ability to carry out ADLs; assess patient's baseline for ADL function and identify physical deficits which impact ability to perform ADLs (bathing, care of mouth/teeth, toileting, grooming, dressing, etc )  - Assess/evaluate cause of self-care deficits   - Assess range of motion  - Assess patient's mobility; develop plan if impaired  - Assess patient's need for assistive devices and provide as appropriate  - Encourage maximum independence but intervene and supervise when necessary  - Involve family in performance of ADLs  - Assess for home care needs following discharge   - Consider OT consult to assist with ADL evaluation and planning for discharge  - Provide patient education as appropriate  Outcome: Progressing  Goal: Maintain or return mobility status to optimal level  Description  INTERVENTIONS:  - Assess patient's baseline mobility status (ambulation, transfers, stairs, etc )    - Identify cognitive and physical deficits and behaviors that affect mobility  - Identify mobility aids required to assist with transfers and/or ambulation (gait belt, sit-to-stand, lift, walker, cane, etc )  - [place] fall precautions as indicated by assessment  - Record patient progress and toleration of activity level on Mobility SBAR; progress patient to next Phase/Stage  - Instruct patient to call for assistance with activity based on assessment  - Consider rehabilitation consult to assist with strengthening/weightbearing, etc   Outcome: Progressing     Problem: DISCHARGE PLANNING  Goal: Discharge to home or other facility with appropriate resources  Description  INTERVENTIONS:  - Identify barriers to discharge w/patient and caregiver  - Arrange for needed discharge resources and transportation as appropriate  - Identify discharge learning needs (meds, wound care, etc )  - Refer to Case Management Department for coordinating discharge planning if the patient needs post-hospital services based on physician/advanced practitioner order or complex needs related to functional status, cognitive ability, or social support system   Outcome: Progressing     Problem: Knowledge Deficit  Goal: Patient/family/caregiver demonstrates understanding of disease process, treatment plan, medications, and discharge instructions  Description  Complete learning assessment and assess knowledge base    Interventions:  - Provide teaching at level of understanding  - Provide teaching via preferred learning methods  Outcome: Progressing     Problem: CARDIOVASCULAR - ADULT  Goal: Maintains optimal cardiac output and hemodynamic stability  Description  INTERVENTIONS:  - Monitor I/O, vital signs and rhythm  - Monitor for S/S and trends of decreased cardiac output  - Administer and titrate ordered vasoactive medications to optimize hemodynamic stability  - Assess quality of pulses, skin color and temperature  - Assess for signs of decreased coronary artery perfusion  - Instruct patient to report change in severity of symptoms  Outcome: Progressing  Goal: Absence of cardiac dysrhythmias or at baseline rhythm  Description  INTERVENTIONS:  - Continuous cardiac monitoring, vital signs, obtain 12 lead EKG if ordered  - Administer antiarrhythmic and heart rate control medications as ordered  - Monitor electrolytes and administer replacement therapy as ordered  Outcome: Progressing     Problem: GASTROINTESTINAL - ADULT  Goal: Maintains adequate nutritional intake  Description  INTERVENTIONS:  - Monitor percentage of each meal consumed  - Identify factors contributing to decreased intake, treat as appropriate  - Assist with meals as needed  - Monitor I&O, weight, and lab values if indicated  - Obtain nutrition services referral as needed  Outcome: Progressing  Goal: Establish and maintain optimal ostomy function  Description  INTERVENTIONS:  - Assess bowel function  - Encourage oral fluids to ensure adequate hydration  - Administer IV fluids if ordered to ensure adequate hydration   - Administer ordered medications as needed  - Encourage mobilization and activity  - Nutrition services referral to assist patient with appropriate food choices  - Assess stoma site  - Consider wound care consult   Outcome: Progressing     Problem: GENITOURINARY - ADULT  Goal: Maintains or returns to baseline urinary function  Description  INTERVENTIONS:  - Assess urinary function  - Encourage oral fluids to ensure adequate hydration if ordered  - Administer IV fluids as ordered to ensure adequate hydration  - Administer ordered medications as needed     Outcome: Progressing  Goal: Absence of urinary retention  Description  INTERVENTIONS:  - Assess patient's ability to void and empty bladder  - Monitor I/O  - Bladder scan as needed  - Discuss with physician/AP medications to alleviate retention as needed  - Discuss catheterization for long term situations as appropriate   Outcome: Progressing  Goal: Urinary catheter remains patent  Description  INTERVENTIONS:  - Assess patency of urinary catheter  - Follow guidelines for intermittent irrigation of non-functioning urinary catheter   Outcome: Progressing     Problem: METABOLIC, FLUID AND ELECTROLYTES - ADULT  Goal: Electrolytes maintained within normal limits  Description  INTERVENTIONS:  - Monitor labs and assess patient for signs and symptoms of electrolyte imbalances  - Administer electrolyte replacement as ordered  - Monitor response to electrolyte replacements, including repeat lab results as appropriate  - Instruct patient on fluid and nutrition as appropriate  Outcome: Progressing  Goal: Fluid balance maintained  Description  INTERVENTIONS:  - Monitor labs   - Monitor I/O and WT  - Instruct patient on fluid and nutrition as appropriate  - Assess for signs & symptoms of volume excess or deficit  Outcome: Progressing     Problem: SKIN/TISSUE INTEGRITY - ADULT  Goal: Skin integrity remains intact  Description  INTERVENTIONS  - Identify patients at risk for skin breakdown  - Assess and monitor skin integrity  - Assess and monitor nutrition and hydration status  - Monitor labs (i e  albumin)  - Assess for incontinence   - Turn and reposition patient  - Assist with mobility/ambulation  - Relieve pressure over bony prominences  - Avoid friction and shearing  - Provide appropriate hygiene as needed including keeping skin clean and dry  - Evaluate need for skin moisturizer/barrier cream  - Collaborate with interdisciplinary team (i e  Nutrition, Rehabilitation, etc )   - Patient/family teaching  Outcome: Progressing  Goal: Incision(s), wounds(s) or drain site(s) healing without S/S of infection  Description  INTERVENTIONS  - Assess and document risk factors for skin impairment   - Assess and document dressing, incision, wound bed, drain sites and surrounding tissue  - Consider nutrition services referral as needed  - Oral mucous membranes remain intact  - Provide patient/ family education  Outcome: Progressing  Goal: Oral mucous membranes remain intact  Description  INTERVENTIONS  - Assess oral mucosa and hygiene practices  - Implement preventative oral hygiene regimen  - Implement oral medicated treatments as ordered  - Initiate Nutrition services referral as needed  Outcome: Progressing     Problem: HEMATOLOGIC - ADULT  Goal: Maintains hematologic stability  Description  INTERVENTIONS  - Assess for signs and symptoms of bleeding or hemorrhage  - Monitor labs  - Administer supportive blood products/factors as ordered and appropriate  Outcome: Progressing     Problem: MUSCULOSKELETAL - ADULT  Goal: Maintain or return mobility to safest level of function  Description  INTERVENTIONS:  - Assess patient's ability to carry out ADLs; assess patient's baseline for ADL function and identify physical deficits which impact ability to perform ADLs (bathing, care of mouth/teeth, toileting, grooming, dressing, etc )  - Assess/evaluate cause of self-care deficits   - Assess range of motion  - Assess patient's mobility  - Assess patient's need for assistive devices and provide as appropriate  - Encourage maximum independence but intervene and supervise when necessary  - Involve family in performance of ADLs  - Assess for home care needs following discharge   - Consider OT consult to assist with ADL evaluation and planning for discharge  - Provide patient education as appropriate  Outcome: Progressing  Goal: Maintain proper alignment of affected body part  Description  INTERVENTIONS:  - Support, maintain and protect limb and body alignment  - Provide patient/ family with appropriate education  Outcome: Progressing

## 2019-08-26 NOTE — PROGRESS NOTES
Progress Note Domenica Blackwell 1960, 63 y o  male MRN: [id number]    Unit/Bed#: E4 -01 Encounter: [id number]    Primary Care Provider: Carl Jiménez MD   Date and time admitted to hospital: 2019 11:55 AM        History of Clostridium difficile colitis  Assessment & Plan  On prophylactic vancomycin    * Sepsis with hypotension [facility])  Assessment & Plan  Due to E  Coli UTI  Defer abx to ID, likely changing to po soon          Subjective:   Feels better  No fever/chills  No abdominal pain      Objective:     Vitals:   Temp (24hrs), Av 1 °F (36 7 °C), Min:97 8 °F (36 6 °C), Max:98 3 °F (36 8 °C)    Temp:  [97 8 °F (36 6 °C)-98 3 °F (36 8 °C)] 98 2 °F (36 8 °C)  HR:  [] 99  Resp:  [18] 18  BP: ()/(62-66) 107/62  SpO2:  [95 %-99 %] 99 %  Body mass index is 31 75 kg/m²  Input and Output Summary (last 24 hours): Intake/Output Summary (Last 24 hours) at 2019 1617  Last data filed at 2019 1516  Gross per 24 hour   Intake 120 ml   Output 3315 ml   Net -3195 ml       Physical Exam:     Physical Exam   HENT:   Head: Normocephalic and atraumatic  Eyes: Pupils are equal, round, and reactive to light  EOM are normal    Cardiovascular: Normal rate and regular rhythm  Exam reveals no gallop and no friction rub  No murmur heard  Pulmonary/Chest: Effort normal and breath sounds normal  He has no wheezes  He has no rales  Abdominal: Soft  Bowel sounds are normal  There is no tenderness  Musculoskeletal: He exhibits no edema  Nursing note and vitals reviewed              Additional Data:     Labs:    Results from last 7 days   Lab Units 19  0540   WBC Thousand/uL 10 07   HEMOGLOBIN g/dL 9 2*   HEMATOCRIT % 29 2*   PLATELETS Thousands/uL 385   NEUTROS PCT % 75   LYMPHS PCT % 17   MONOS PCT % 6   EOS PCT % 1     Results from last 7 days   Lab Units 19  0540  19  1211   POTASSIUM mmol/L 3 0*   < > 3 1*   CHLORIDE mmol/L 105   < > 94*   CO2 mmol/L 24   < > 24   BUN mg/dL 8   < > 8   CREATININE mg/dL 0 52*   < > 0 70   CALCIUM mg/dL 8 9   < > 8 6   ALK PHOS U/L  --   --  166*   ALT U/L  --   --  20   AST U/L  --   --  32    < > = values in this interval not displayed  Results from last 7 days   Lab Units 08/23/19  1211   INR  1 22*     Results from last 7 days   Lab Units 08/24/19  1705 08/24/19  0857   POC GLUCOSE mg/dl 128 95               * I Have Reviewed All Lab Data     Recent Cultures (last 7 days):     Results from last 7 days   Lab Units 08/23/19  1450 08/23/19  1226 08/23/19  1211   BLOOD CULTURE   --  No Growth at 48 hrs  No Growth at 48 hrs  URINE CULTURE  30,000-39,000 cfu/ml Escherichia coli*  --   --          Last 24 Hours Medication List:     Current Facility-Administered Medications:  acetaminophen 650 mg Oral Q6H PRN Odalis Walker PA-C    cefazolin 2,000 mg Intravenous KALPANA [place] Last Rate: 2,000 mg (08/26/19 0914)   enoxaparin 40 mg Subcutaneous Q24H [address] Swathi Thomas MD    morphine injection 4 mg Intravenous Q4H PRN Natalia Summers PA-C    oxyCODONE 20 mg Oral Q8H PRN Swathi Thomas MD    pantoprazole 40 mg Oral Early Morning Swathi Thomas MD    vancomycin 125 mg Oral Q12H [address] Becky Lloyd MD          VTE Pharmacologic Prophylaxis:   Pharmacologic: Enoxaparin (Lovenox)      Current Length of Stay: 3 day(s)    Current Patient Status: Inpatient       Discharge Plan: hopefully discharge in 1=2 days    Code Status: Level 1 - Full Code           Today, Patient Was Seen By: Corin David DO    ** Please Note: Dictation voice to text software may have been used in the creation of this document   **

## 2019-08-26 NOTE — PHYSICIAN ADVISOR
Current patient class: Inpatient  The patient is currently on Hospital Day: 4 at [facility]        The patient is  documented to require at least a 2nd midnight in the hospital  As such the patient is  expected to satisfy the 2 midnight benchmark and is therefore eligible for inpatient admission  After review of the relevant documentation, labs, vital signs and test results, the patient is appropriate for INPATIENT ADMISSION  Admission to the hospital as an inpatient is a complex decision making process which requires the practitioner to consider the patients presenting complaint, history and physical examination and all relevant testing  With this in mind, in this case, the patient was deemed appropriate for INPATIENT ADMISSION  After review of the documentation and testing available at the time of the admission I concur with this clinical determination of medical necessity  Rationale is as follows:     The patient is a 63 yrs Male who presented to the ED at 8/23/2019 11:55 AM with a chief complaint of Fever - 9 weeks to 74 years (states with fever x2 days highes 103 last medicated self with 1000 mg of tylenol at 11 this am  Pt straight caths at home last cathed at 8 pm states this am was getting resistance unabel to cath self  )  Patient admitted for sepsis with hypotension sec to deep tissue infection of the perineal area and 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 large stage IV decubitis ulcer -CT scan showed SQ gas in perirectal and perineal region - he was treated with IV fluids and IV antibiotics , made NPO and seen in consultation by urology and felt to have nonfunctional penile prosthesis with surrounding gas likely UTI - IV antibiotics continued for complicated UTI   Patient spiked a temp 101 4 on 8/24 in the evening - also noted to have leukocytosis - IV antibiotics continued   He is appropriate for inpatient admission   The patients vitals on arrival were ED Triage Vitals Temperature Pulse Respirations Blood Pressure SpO2   08/23/19 1152 08/23/19 1152 08/23/19 1152 08/23/19 1152 08/23/19 1152   100 2 °F (37 9 °C) (!) 121 18 104/58 99 %      Temp Source Heart Rate Source Patient Position - Orthostatic VS BP Location FiO2 (%)   08/23/19 1152 08/23/19 1152 08/23/19 1152 08/23/19 1152 --   Oral Monitor Lying Left arm       Pain Score       08/23/19 1655       No Pain           Past Medical History:   Diagnosis Date    Anemia     Blind     r eye    Chronic cystitis     Col[facility] in place [facility])     Detrusor sphincter dyssynergia     Diabetes mellitus ([facility] [address] )     Poorly controlled type 2; Last Assessed:  3/18/14    Erectile dysfunction     Frequency of urination     GERD (gastroesophageal reflux disease)     History of diabetes mellitus     History of osteomyelitis     Hx of leg amputation (HCC)     r high upper leg    Hyperlipidemia     Hypertension     Hypospadias     Incomplete bladder emptying     Neurogenic bladder     [facility])     [facility]aplegia ([facility])     Spinal cord cysts     Ulcer of sacral region ([facility] [address] )     Urge incontinence      Past Surgical History:   Procedure Laterality Date    AMPUTATION      At hip; Last Assessed:  1/19/16    BLADDER SURGERY      COLON SURGERY      llq ostomy pouch    COLOSTOMY      COMPLEX CYSTOMETROGRAM  2014    CYSTOSCOPY  2014    LEG AMPUTATION      MEATOTOMY      PENILE PROSTHESIS IMPLANT  2011    AR ADJ TISS XFER SCALP,EXTREM 10 1-30 SQCM Left 5/1/2017    Procedure: POSTERIOR THIGH V-Y ADMANCEMENT;  Surgeon: Cong Tse MD;  [facility];  Service: Plastics    AR MUSCLE-SKIN FLAP,TRUNK Left 5/1/2017    Procedure: FLAP CLOSURE LEFT ISCHIAL WOUND and "RIGHT" ISCHIAL FLAP ADVANCEMENT * [place]ID[place], [place] ;  Surgeon: Cong Tse MD;  [facility];  Service: Plastics    AR MUSCLE-SKIN FLAP,TRUNK Left 9/27/2017    Procedure: gluteal myocutaneous rotational flap, posterior thigh v to y advancement- wound 5 x 2 5 x 8;  Surgeon: Marshall Jaimes MD;  [facility];  Service: Plastics    SPINE SURGERY      Lower back    UROFLOWMETRY SIMPLE / COMPLEX  2014       The patient was admitted to the hospital at  [address] PM on 8/23/19 for the following diagnosis:  UTI (urinary tract infection) [N39 0]  Neurogenic bladder [N31 9]  Fever [R50 9]  Wound of sacral region, initial encounter [S31 000A]  Sepsis due to urinary tract infection ([address] ) [A41 9, N39 0]    Consults have been placed to:   IP CONSULT TO UROLOGY  IP CONSULT TO UROLOGY  IP CONSULT TO INFECTIOUS DISEASES  IP CONSULT TO WOUND CARE  IP CONSULT TO ACUTE CARE SURGERY    Vitals:    08/25/19 1538 08/25/19 2332 08/26/19 0700 08/26/19 1516   BP: 144/86 96/65 104/66 107/62   BP Location: Left arm Left arm Left arm Left arm   Pulse: 70 (!) 111 101 99   Resp: 18 18 18 18   Temp: 100 2 °F (37 9 °C) 98 3 °F (36 8 °C) 97 8 °F (36 6 °C) 98 2 °F (36 8 °C)   TempSrc: Tympanic Temporal Tympanic Temporal   SpO2: 97% 95% 97% 99%   Weight:       Height:           Most recent labs:    Recent Labs     08/26/19  0540   WBC 10 07   HGB 9 2*   HCT 29 2*      K 3 0*   CALCIUM 8 9   BUN 8   CREATININE 0 52*       Scheduled Meds:  Current Facility-Administered Medications:  acetaminophen 650 mg Oral Q6H PRN Odalis Walker PA-C    cefazolin 2,000 mg Intravenous Q8H [place]NA El Last Rate: 2,000 mg (08/26/19 0914)   enoxaparin 40 mg Subcutaneous Q24H [facility] Mik Carrillo MD    morphine injection 4 mg Intravenous Q4H PRN Tena Johnston PA-C    oxyCODONE 20 mg Oral Q8H PRN Mik Carrillo MD    pantoprazole 40 mg Oral Early Morning Mik Carrillo MD    potassium chloride 40 mEq Oral BID Andrew Cain DO    vancomycin 125 mg Oral Q12H [facility] Yamilex Barkley MD      Continuous Infusions:   PRN Meds:   acetaminophen    morphine injection    oxyCODONE    Surgical procedures (if appropriate):  Procedure(s):  REMOVAL PROSTHESIS PENILE

## 2019-08-26 NOTE — PROGRESS NOTES
Progress Note - Urology  David Steward 1960, 63 y o  male MRN: [id number]    Unit/Bed#: E4 -01 Encounter: [id number]    Neurogenic bladder  Assessment & Plan  With history of ileal cystoplasty augmentation in 2014  Maintained at home on CIC C3 times daily  Had difficulty catheterizing at home  RN and ER unable to place catheter  Bedside Dangelo placement with 18 Romansh coude by attending 8/23/19  Ventral penile erosion noted and chronic, no actively infected or inflamed appearing tissue about the penis scrotum or perineum  Given suspicion of false passage on multiple attempts to place catheter, recommend maintaining dangelo to gravity drainage for 7 days  Do not remove before 8/30/2019, not nursing managed  At that point patient may resume CIC  Office follow-up to discuss future IPP explant  Clinically improving, afebrile, leukocytosis improving  Treatment of UTI by primary team, appreciate ID input for antibiotic coverage- current coverage de-escalated to cefazolin today with plans for PO transition tomorrow  Bedside rounds performed with  Dalila Moore primary RN and Olvin Gaston wound care RN  Discussed with Dr Joel Situ  Urology will continue to follow  Subjective:   HPI:  Feeling good today reports feeling better each day he has been here  He is afebrile  He has chronic pain in his backside, which he states is unchanged from his daily baseline pain  He denies any pain in the penis or scrotum  He is tolerating Dangelo catheter well  He has no spasm or discomfort related to the catheter  Plan for tentative Dangelo catheter removal 08/30/2019, which point he will resume CIC    Ceftriaxone IV de-escalated to Cefazolin today (complicated UTI)  Vancomycin PO (c diff proph)    Review of Systems   Constitutional: Negative  Negative for activity change, appetite change, chills, fever and unexpected weight change  HENT: Negative  Respiratory: Negative  Negative for shortness of breath  Cardiovascular: Negative  Negative for chest pain  Gastrointestinal: Negative for abdominal pain, diarrhea, nausea and vomiting  Endocrine: Negative  Genitourinary: Positive for difficulty urinating (CIC at baseline)  Negative for decreased urine volume, dysuria, flank pain, frequency, hematuria and urgency  Musculoskeletal: Positive for back pain and gait problem (right AKA)  Skin: Positive for wound (large sacral decubitus wound)  Allergic/Immunologic: Negative  Hematological: Negative for adenopathy  Does not bruise/bleed easily  Objective:  Nursing Rounds: no overnight issues, Solomon draining well, flushing q shift to prevent mucous buildup; afebrile  Vitals: Blood pressure 104/66, pulse 101, temperature 97 8 °F (36 6 °C), temperature source Tympanic, resp  rate 18, height 5' 4" (1 626 m), weight 83 9 kg (184 lb 15 5 oz), SpO2 97 %  ,Body mass index is 31 75 kg/m²  Intake/Output Summary (Last 24 hours) at 8/26/2019 1418  Last data filed at 8/26/2019 1201  Gross per 24 hour   Intake 120 ml   Output 2515 ml   Net -2395 ml     Invasive Devices     Peripheral Intravenous Line            Peripheral IV 08/23/19 Left Antecubital 3 days    Peripheral IV 08/23/19 Left Forearm 2 days          Drain            Colostomy Descending/sigmoid LLQ -- days    Urethral Catheter Coude 2 days                Physical Exam   Constitutional: He is oriented to person, place, and time  He appears well-developed and well-nourished  No distress  HENT:   Head: Normocephalic and atraumatic  Cardiovascular: Normal rate, regular rhythm and normal heart sounds  No murmur heard  Pulmonary/Chest: Effort normal and breath sounds normal    Abdominal: Soft  Bowel sounds are normal  He exhibits no distension  There is no tenderness  Genitourinary:   Genitourinary Comments: Solomon catheter per urethra draining clear yellow urine  Ventral penile urethral erosion- chronic  IPP pump in left hemiscrotum   No edema or erythema of the penis scrotum or perineum  Genitalia nontender  Musculoskeletal: He exhibits no edema  Right AKA   Neurological: He is alert and oriented to person, place, and time  Skin: Skin is warm and dry  Capillary refill takes less than 2 seconds  He is not diaphoretic  Large advanced stage sacral decubitus wound without purulence or cellulitis   Nursing note and vitals reviewed        History:    Past Medical History:   Diagnosis Date    Anemia     Blind     r eye    Chronic cystitis     Colostomy in place [facility])     Detrusor sphincter dyssynergia     Diabetes mellitus ([facility] [address] )     Poorly controlled type 2; Last Assessed:  3/18/14    Erectile dysfunction     Frequency of urination     GERD (gastroesophageal reflux disease)     History of diabetes mellitus     History of osteomyelitis     Hx of leg amputation (HCC)     r high upper leg    Hyperlipidemia     Hypertension     Hypospadias     Incomplete bladder emptying     Neurogenic bladder     Paralysis (HCC)     [facility])     Spinal cord cysts     [facility]     Urge incontinence      Past Surgical History:   Procedure Laterality Date    AMPUTATION      At hip; Last Assessed:  1/19/16    BLADDER SURGERY      COLON SURGERY      llq ostomy pouch    COLOSTOMY      COMPLEX CYSTOMETROGRAM  2014    CYSTOSCOPY  2014    LEG AMPUTATION      MEATOTOMY      PENILE PROSTHESIS IMPLANT  2011    NV ADJ TISS XFER SCALP,EXTREM 10 1-30 SQCM Left 5/1/2017    Procedure: POSTERIOR THIGH V-Y ADMANCEMENT;  Surgeon: Zaid Bennett MD;  [facility];  Service: Plastics    NV MUSCLE-SKIN FLAP,TRUNK Left 5/1/2017    Procedure: FLAP CLOSURE LEFT ISCHIAL WOUND and "RIGHT" ISCHIAL FLAP ADVANCEMENT * [place]IDEMENT, [place] ;  Surgeon: Zaid Bennett MD;  [facility];  Service: Plastics    NV MUSCLE-SKIN FLAP,TRUNK Left 9/27/2017    Procedure: gluteal myocutaneous rotational flap, posterior thigh v to y advancement- wound 5 x 2 5 x 8;  Surgeon: Shade Espinosa MD;  [facility];  Service: Plastics    SPINE SURGERY      Lower back    UROFLOWMETRY SIMPLE / COMPLEX       Family History   Problem Relation Age of Onset    No Known Problems Mother     No Known Problems Father      Social History     Socioeconomic History    Marital status: /Civil Union     Spouse name: None    Number of children: None    Years of education: None    Highest education level: None   Occupational History    None   Social Needs    Financial resource strain: None    Food insecurity:     Worry: None     Inability: None    Transportation needs:     Medical: None     Non-medical: None   Tobacco Use    Smoking status: Former Smoker     Packs/day: 0 50     Years: 10 00     Pack years: 5 00     Last attempt to quit:      Years since quittin 6    Smokeless tobacco: Never Used    Tobacco comment: Onset date:  11/10/17   Substance and Sexual Activity    Alcohol use: Never     Frequency: Never     Comment: Per [employer]:  Social drinker (Onset date:  11/10/17)    Drug use: No    Sexual activity: None   Lifestyle    Physical activity:     Days per week: None     Minutes per session: None    Stress: None   Relationships    Social connections:     Talks on phone: None     Gets together: None     Attends Religion service: None     Active member of club or organization: None     Attends meetings of clubs or organizations: None     Relationship status: None    Intimate partner violence:     Fear of current or ex partner: None     Emotionally abused: None     Physically abused: None     Forced sexual activity: None   Other Topics Concern    None   Social History Narrative    [facility] language [address] [place] [address] Dr Ontiveros    Social history reviewed, unchanged         Labs:  Recent Labs     19  0434 19  0519 19  0540   WBC 8 28 10 74* 10 07       Recent Labs     19  0434 19  0519 08/26/19  0540   HGB 8 8* 8 9* 9 2*     Recent Labs     08/24/19  0434 08/25/19  0519 08/26/19  0540   HCT 29 6* 28 6* 29 2*     Recent Labs     08/23/19  2136 08/24/19  0434 08/25/19  0519 08/26/19  0540   CREATININE 0 56* 0 51* 0 44* 0 52*           Urine culture [[id number]] (Abnormal)  Collected: 08/23/19 1450   Lab Status: Final result Specimen: Urine, Indwelling Solomon Catheter Updated: 08/25/19 5910    Urine Culture 30,000-39,000 cfu/ml Escherichia coli   Susceptibility     Escherichia coli (1)     Antibiotic Interpretation Microscan Method Status    Ampicillin ($$) Susceptible <=8 00 ug/ml [place] Final    Aztreonam ($$$)  Susceptible <=4 ug/ml [place] Final    Cefazolin ($) Susceptible <=2 00 ug/ml [place] Final    Ciprofloxacin ($)  Resistant >2 00 ug/ml [place] Final    Gentamicin ($$) Susceptible <=1 ug/ml [place] Final    Levofloxacin ($) Resistant >4 00 ug/ml [place]BINA Final    Nitrofurantoin Susceptible <=32 ug/ml [place] Final    Tetracycline Resistant >8 ug/ml [place]BINA Final    Tobramycin ($) Susceptible <=1 ug/ml [place] Final    Trimethoprim + Sulfamethoxazole ($$$) Susceptible <=2/38 ug/ml BALBINA Final            Blood culture 2/2 no growth at 48 hrs      Perla Browne PA-C  Date: 8/26/2019 Time: 2:18 PM

## 2019-08-26 NOTE — UTILIZATION REVIEW
Initial Clinical Review    Admission: Date/Time/Statement: Inpatient Admission Orders (From admission, onward)     Ordered        08/23/19 1612  Inpatient Admission (expected length of stay for this patient Order details is greater than two midnights)  Once                   Orders Placed This Encounter   Procedures    Inpatient Admission (expected length of stay for this patient Order details is greater than two midnights)     Standing Status:   Standing     Number of Occurrences:   1     Order Specific Question:   Admitting Physician     Answer:   Peter Carlos [[id number]]     Order Specific Question:   Level of Care     Answer:   Med Surg [[id number]]     Order Specific Question:   Estimated length of stay     Answer:   More than 2 Midnights     Order Specific Question:   Certification     Answer:   I certify that inpatient services are medically necessary for this patient for a duration of greater than two midnights  See H&P and MD Progress Notes for additional information about the patient's course of treatment  ED Arrival Information     Expected Arrival Acuity Means of Arrival Escorted By Service Admission Type    - 8/23/2019 11:48 Emergent Ambulance [facility] ([facility]) Hospitalist Emergency    Arrival Complaint    fever        Chief Complaint   Patient presents with    Fever - 9 weeks to 74 years     states with fever x2 days highes 103 last medicated self with 1000 mg of tylenol at 11 this am  Pt straight caths at home last cathed at 8 pm states this am was getting resistance unabel to cath self  Assessment/Plan: 62 yo male presents to ED from home via EMS with fevers x 2 days  He has a neurogenic bladder, self caths; hx of UTI's  Unable to self cath this am  Also with a large Sacral wound left buttocks followed by VN's  very large with packing in place - lots of foul smelling drainage coming from area  He received 2 L of IV fluid in the ER and was started on empiric antibiotics    A CT of the abdomen was done prior to transfer to the medical floor  Upon arrival to this floor, CT of the abdomen and pelvis was read by Radiology which revealed deep soft tissue infection of the perineal area suspicious for Vega gangrene  Admitted to IP:  Sepsis with hypotension ([address] )  Assessment & Plan  · Secondary to deep tissue infection of the perineal area and suspected Vega gangrene versus large stage IV decubitus ulcer with chronic osteomyelitis  · Continue 3rd L of normal saline bolus  · Start Zosyn and vancomycin  · Discussed with Urology regarding critical findings on CT imaging  And they will come to evaluate the patient soon and will take him to the operating room  · Follow-up culture result  · Transfer to step-down unit  Discussed with critical care team   If he continues to deteriorate he will be transferred to critical care  · Consult Infectious Disease     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  Assessment & Plan  · Maintain NPO status  · Anticipate urgent surgery tonight by Urology  · Start empiric vancomycin and Zosyn     Opioid use  Assessment & Plan  · PD MP website reviewed  Medications confirmed      GERD (gastroesophageal reflux disease)  Assessment & Plan  · Switch omeprazole to IV Protonix while NPO      Stage IV pressure ulcer of sacral region [facility])  Assessment & Plan  · Present on admission  · With chronic osteomyelitis  · Await surgical intervention tonight  · Consult wound care  · If more debridement is required, he may need acute care surgery      Anticipated Length of Stay:  Patient will be admitted on an Inpatient basis with an anticipated length of stay of  At least 2 midnights  Justification for Hospital Stay: sepsis    Per ID: Sepsis  With hypotension  Likely due to Complicated UTI  Imaging without findings of pyelonephritis or obstruction  Chronic sacral ulcers and penile implant  No current issues clinically at penile prosthesis  Surgical evaluation without signs of infection at ulcer sites       ED Triage Vitals   Temperature Pulse Respirations Blood Pressure SpO2   08/23/19 1152 08/23/19 1152 08/23/19 1152 08/23/19 1152 08/23/19 1152   100 2 °F (37 9 °C) (!) 121 18 104/58 99 %      Temp Source Heart Rate Source Patient Position - Orthostatic VS BP Location FiO2 (%)   08/23/19 1152 08/23/19 1152 08/23/19 1152 08/23/19 1152 --   Oral Monitor Lying Left arm       Pain Score       08/23/19 1655       No Pain        Wt Readings from Last 1 Encounters:   08/23/19 83 9 kg (184 lb 15 5 oz)     Additional Vital Signs:   08/24/19 2052  101 3 °F (38 5 °C)Abnormal   120Abnormal   18  80/56Abnormal   98 %  None (Room air)  Lying   08/24/19 1630  100 6 °F (38 1 °C)Abnormal   66  18  118/56  98 %  None (Room air)  Lying   08/24/19 1142  100 3 °F (37 9 °C)  68  18  107/55  100 %  None (Room air)  Lying       Pertinent Labs/Diagnostic Test Results:   Results from last 7 days   Lab Units 08/26/19  0540 08/25/19  0519 08/24/19  0434 08/23/19  1211   WBC Thousand/uL 10 07 10 74* 8 28 6 18   HEMOGLOBIN g/dL 9 2* 8 9* 8 8* 10 1*   HEMATOCRIT % 29 2* 28 6* 29 6* 33 6*   PLATELETS Thousands/uL 385 327 285 283   NEUTROS ABS Thousands/µL 7 48 8 55* 6 81  --    BANDS PCT %  --   --   --  6     Results from last 7 days   Lab Units 08/26/19  0540 08/25/19  0519 08/24/19  0434 08/23/19  2136 08/23/19  1211   SODIUM mmol/L 137 140 140 138 130*   POTASSIUM mmol/L 3 0* 3 6 3 5 3 0* 3 1*   CHLORIDE mmol/L 105 109* 107 104 94*   CO2 mmol/L 24 22 18* 22 24   ANION GAP mmol/L 8 9 15* 12 12   BUN mg/dL 8 7 6 6 8   CREATININE mg/dL 0 52* 0 44* 0 51* 0 56* 0 70   EGFR ml/min/1 73sq m 118 126 118 114 104   CALCIUM mg/dL 8 9 8 8 8 3 8 0* 8 6     Results from last 7 days   Lab Units 08/23/19  1211   AST U/L 32   ALT U/L 20   ALK PHOS U/L 166*   TOTAL PROTEIN g/dL 8 1   ALBUMIN g/dL 2 2*   TOTAL BILIRUBIN mg/dL 0 75     Results from last 7 days   Lab Units 08/24/19  1705 08/24/19  0857   POC GLUCOSE mg/dl 128 95     Results from last 7 days   Lab Units 08/26/19  0540 08/25/19  0519 08/24/19  0434 08/23/19  2136 08/23/19  1211   GLUCOSE RANDOM mg/dL 88 90 48* 67 79     Results from last 7 days   Lab Units 08/23/19  1211   PROTIME seconds 15 6*   INR  1 22*   PTT seconds 42*     Results from last 7 days   Lab Units 08/23/19  1445 08/23/19  1211   LACTIC ACID mmol/L 1 3 2 4*     Results from last 7 days   Lab Units 08/23/19  1450 08/21/19  1003   CLARITY UA  Slightly Cloudy Turbid   COLOR UA  Yellow Orange   SPEC GRAV UA  1 010 1 019   PH UA  6 0 6 0   GLUCOSE UA mg/dl Negative Negative   KETONES UA mg/dl 15 (1+)* Negative   BLOOD UA  Large* Negative   PROTEIN UA mg/dl 30 (1+)* 30 (1+)*   NITRITE UA  Positive* Negative   BILIRUBIN UA  Interference- unable to analyze* Small*   UROBILINOGEN UA E U /dl 1 0 1 0   LEUKOCYTES UA  Large* Negative   WBC UA /hpf Field obscured, unable to enumerate* Innumerable*   RBC UA /hpf Field obscured, unable to enumerateExpress Scripts obscured, unable to enumerate*   BACTERIA UA /hpf Field obscured, unable to enumerate* Innumerable*   EPITHELIAL CELLS WET PREP /hpf Field obscured, unable to enumerate* Field obscured, unable to enumerate*   MUCUS THREADS   --  Innumerable*     Results from last 7 days   Lab Units 08/23/19  1450 08/23/19  1226 08/23/19  1211   BLOOD CULTURE   --  No Growth at 48 hrs  No Growth at 48 hrs  URINE CULTURE  30,000-39,000 cfu/ml Escherichia coli*  --   --      8/23 CT A&P:   1   Multiple foci of soft tissue gas in the penis, perineum and scrotal region with soft tissue gas extending through the left perirectal space to the upper pelvis to the level of the sigmoid colon   The findings are suspicious for deep pelvic and perineal soft tissue infection with gas-forming organisms/Vega's gangrene  2  Elidia Paddock is soft tissue thickening with several foci of air adjacent to the penile implant, suggestive of infection of the penile implant    3  Multiple sites of sacral and ischial decubitus ulcers with chronic osteomyelitis of the ischial, pubic bones, and sacrum, similar to the prior exam   4   Small volume perihepatic ascites  5   Moderate gallbladder distention   No calcified gallstones      ED Treatment:   Medication Administration from 08/23/2019 1148 to 08/23/2019 1732       Date/Time Order Dose Route Action     08/23/2019 1300 sodium chloride 0 9 % bolus 1,000 mL 1,000 mL Intravenous New Bag     08/23/2019 1506 sodium chloride 0 9 % bolus 1,000 mL 1,000 mL Intravenous New Bag     08/23/2019 1704 sodium chloride 0 9 % bolus 1,000 mL 1,000 mL Intravenous New Bag     08/23/2019 1347 ceftriaxone (ROCEPHIN) 2 g/50 mL in dextrose IVPB 2,000 mg Intravenous New Bag     08/23/2019 1612 iohexol (OMNIPAQUE) 350 MG/ML injection (MULTI-DOSE) 100 mL 100 mL Intravenous Given     08/23/2019 1635 acetaminophen (TYLENOL) tablet 650 mg 650 mg Oral Given        Past Medical History:   Diagnosis Date    Anemia     Blind     r eye    Chronic cystitis     Colostomy in place [facility])     Detrusor sphincter dyssynergia     Diabetes mellitus ([address] )     Poorly controlled type 2; Last Assessed:  3/18/14    Erectile dysfunction     Frequency of urination     GERD (gastroesophageal reflux disease)     History of diabetes mellitus     History of osteomyelitis     Hx of leg amputation (HCC)     r high upper leg    Hyperlipidemia     Hypertension     Hypospadias     Incomplete bladder emptying     Neurogenic bladder     Paralysis (HCC)     Paraplegia (HCC)     Spinal cord cysts     Ulcer of sacral region ([address] )     Urge incontinence      Present on Admission:   Stage IV pressure ulcer of sacral region ([address] )   GERD (gastroesophageal reflux disease)   Opioid use      Admitting Diagnosis: UTI (urinary tract infection) [N39 0]  Neurogenic bladder [N31 9]  Fever [R50 9]  Wound of sacral region, initial encounter [S31 000A]  Sepsis due to urinary tract infection ([address] ) [A41 9, N39 0]  Age/Sex: 63 y o  male  Admission Orders:    Current Facility-Administered Medications:  acetaminophen 650 mg Oral Q6H PRN    cefazolin 2,000 mg Intravenous Q8H   Started 8/26   enoxaparin 40 mg Subcutaneous Q24H WALTER    morphine injection 4 mg Intravenous Q4H PRN    oxyCODONE 20 mg Oral Q8H PRN    pantoprazole 40 mg Oral Early Morning    vancomycin 125 mg Oral Q12H [address]  started po 8/24     IVF's @ 75 / hr  Rocephin IV qd  Zosyn IV q 6h  Vanco IV q 12h        IP CONSULT TO UROLOGY  IP CONSULT TO INFECTIOUS DISEASES  IP CONSULT TO WOUND CARE  IP CONSULT TO ACUTE CARE SURGERY    Network Utilization Review Department  Phone: [phone]; Fax [phone]  [email or web address]  ATTENTION: Please call with any questions or concerns to [phone]  and carefully listen to the prompts so that you are directed to the right person  Send all requests for admission clinical reviews, approved or denied determinations and any other requests to fax [phone]   All voicemails are confidential

## 2019-08-27 LAB
ANION GAP SERPL CALCULATED.3IONS-SCNC: 10 MMOL/L (ref 4–13)
BASOPHILS # BLD AUTO: 0.05 THOUSANDS/ΜL (ref 0–0.1)
BASOPHILS NFR BLD AUTO: 1 % (ref 0–1)
BUN SERPL-MCNC: 11 MG/DL (ref 5–25)
CALCIUM SERPL-MCNC: 9.2 MG/DL (ref 8.3–10.1)
CHLORIDE SERPL-SCNC: 105 MMOL/L (ref 100–108)
CO2 SERPL-SCNC: 26 MMOL/L (ref 21–32)
CREAT SERPL-MCNC: 0.56 MG/DL (ref 0.6–1.3)
EOSINOPHIL # BLD AUTO: 0.21 THOUSAND/ΜL (ref 0–0.61)
EOSINOPHIL NFR BLD AUTO: 2 % (ref 0–6)
ERYTHROCYTE [DISTWIDTH] IN BLOOD BY AUTOMATED COUNT: 17.8 % (ref 11.6–15.1)
GFR SERPL CREATININE-BSD FRML MDRD: 114 ML/MIN/1.73SQ M
GLUCOSE SERPL-MCNC: 100 MG/DL (ref 65–140)
HCT VFR BLD AUTO: 32.7 % (ref 36.5–49.3)
HGB BLD-MCNC: 10 G/DL (ref 12–17)
IMM GRANULOCYTES # BLD AUTO: 0.07 THOUSAND/UL (ref 0–0.2)
IMM GRANULOCYTES NFR BLD AUTO: 1 % (ref 0–2)
LYMPHOCYTES # BLD AUTO: 1.86 THOUSANDS/ΜL (ref 0.6–4.47)
LYMPHOCYTES NFR BLD AUTO: 20 % (ref 14–44)
MAGNESIUM SERPL-MCNC: 1.7 MG/DL (ref 1.6–2.6)
MCH RBC QN AUTO: 24.6 PG (ref 26.8–34.3)
MCHC RBC AUTO-ENTMCNC: 30.6 G/DL (ref 31.4–37.4)
MCV RBC AUTO: 80 FL (ref 82–98)
MONOCYTES # BLD AUTO: 0.54 THOUSAND/ΜL (ref 0.17–1.22)
MONOCYTES NFR BLD AUTO: 6 % (ref 4–12)
NEUTROPHILS # BLD AUTO: 6.46 THOUSANDS/ΜL (ref 1.85–7.62)
NEUTS SEG NFR BLD AUTO: 70 % (ref 43–75)
NRBC BLD AUTO-RTO: 0 /100 WBCS
PLATELET # BLD AUTO: 479 THOUSANDS/UL (ref 149–390)
PMV BLD AUTO: 9.6 FL (ref 8.9–12.7)
POTASSIUM SERPL-SCNC: 3.2 MMOL/L (ref 3.5–5.3)
RBC # BLD AUTO: 4.07 MILLION/UL (ref 3.88–5.62)
SODIUM SERPL-SCNC: 141 MMOL/L (ref 136–145)
WBC # BLD AUTO: 9.19 THOUSAND/UL (ref 4.31–10.16)

## 2019-08-27 PROCEDURE — 99233 SBSQ HOSP IP/OBS HIGH 50: CPT | Performed by: PHYSICIAN ASSISTANT

## 2019-08-27 PROCEDURE — 99232 SBSQ HOSP IP/OBS MODERATE 35: CPT | Performed by: HOSPITALIST

## 2019-08-27 PROCEDURE — 85025 COMPLETE CBC W/AUTO DIFF WBC: CPT | Performed by: HOSPITALIST

## 2019-08-27 PROCEDURE — 83735 ASSAY OF MAGNESIUM: CPT | Performed by: HOSPITALIST

## 2019-08-27 PROCEDURE — 80048 BASIC METABOLIC PNL TOTAL CA: CPT | Performed by: HOSPITALIST

## 2019-08-27 PROCEDURE — 99232 SBSQ HOSP IP/OBS MODERATE 35: CPT | Performed by: INTERNAL MEDICINE

## 2019-08-27 RX ORDER — POTASSIUM CHLORIDE 20 MEQ/1
40 TABLET, EXTENDED RELEASE ORAL ONCE
Status: COMPLETED | OUTPATIENT
Start: 2019-08-27 | End: 2019-08-27

## 2019-08-27 RX ADMIN — VANCOMYCIN HYDROCHLORIDE 125 MG: 5 INJECTION, POWDER, LYOPHILIZED, FOR SOLUTION INTRAVENOUS at 11:13

## 2019-08-27 RX ADMIN — POTASSIUM CHLORIDE 40 MEQ: 1500 TABLET, EXTENDED RELEASE ORAL at 11:12

## 2019-08-27 RX ADMIN — POTASSIUM CHLORIDE 40 MEQ: 1500 TABLET, EXTENDED RELEASE ORAL at 14:09

## 2019-08-27 RX ADMIN — MORPHINE SULFATE 4 MG: 4 INJECTION INTRAVENOUS at 18:40

## 2019-08-27 RX ADMIN — VANCOMYCIN HYDROCHLORIDE 125 MG: 5 INJECTION, POWDER, LYOPHILIZED, FOR SOLUTION INTRAVENOUS at 20:15

## 2019-08-27 RX ADMIN — OXYCODONE HYDROCHLORIDE 20 MG: 10 TABLET ORAL at 20:14

## 2019-08-27 RX ADMIN — CEFAZOLIN SODIUM 2000 MG: 2 SOLUTION INTRAVENOUS at 02:06

## 2019-08-27 RX ADMIN — PANTOPRAZOLE SODIUM 40 MG: 40 TABLET, DELAYED RELEASE ORAL at 05:18

## 2019-08-27 RX ADMIN — OXYCODONE HYDROCHLORIDE 20 MG: 10 TABLET ORAL at 11:12

## 2019-08-27 RX ADMIN — CEFAZOLIN SODIUM 2000 MG: 2 SOLUTION INTRAVENOUS at 17:56

## 2019-08-27 RX ADMIN — POTASSIUM CHLORIDE 40 MEQ: 1500 TABLET, EXTENDED RELEASE ORAL at 17:55

## 2019-08-27 RX ADMIN — CEFAZOLIN SODIUM 2000 MG: 2 SOLUTION INTRAVENOUS at 11:14

## 2019-08-27 RX ADMIN — ENOXAPARIN SODIUM 40 MG: 40 INJECTION SUBCUTANEOUS at 11:13

## 2019-08-27 NOTE — ASSESSMENT & PLAN NOTE
IPP in place, nonfunctioning  I again reviewed with him today that given his sacral decubitus ulcer, he is at extremely high risk for erosion and infection of this prosthesis  We reviewed that if this becomes truly infected, he could become septic and have a life-threatening illness secondary to that  Fortunately he has improved with IV antibiotics this hospitalization but moving forward may have more complications  I offered him removal of penile prosthesis during this hospitalization, once transition to p o  Antibiotics  He today again refused, stating at this time he is not wanting to have surgery  We reviewed the risks of not explanting the prosthetic  He clearly verbalized understanding of this  I discussed with Dr Yonatan España of [facility]  He will continue to discuss with patient throughout hospitalization

## 2019-08-27 NOTE — NURSING NOTE
Dr Nilda Brown made aware of patient's potassium level of 3 2  40mEq administered as previously ordered

## 2019-08-27 NOTE — ASSESSMENT & PLAN NOTE
· Related to urinary retention/neurogenic bladder/penile implant    · Continue ceftriaxone  · Spoke with urology, recommend removal of nonfunctioning penile prosthesis, but patient is currently refusing Primary Defect Length In Cm (Final Defect Size - Required For Flaps/Grafts): 1.6

## 2019-08-27 NOTE — PROGRESS NOTES
Progress Note - Urology  Colletta Bossier 1960, 63 y o  male MRN: [id number]    Unit/Bed#: E4 -01 Encounter: [id number]    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, subsequent encounter  Assessment & Plan  IPP in place, nonfunctioning  I reviewed with him today pat, given his sacral decubitus ulcer, he is at extremely high risk for erosion and infection of this prosthesis  We reviewed that if this becomes truly infected, he could become septic and have a life-threatening illness secondary to that  I offered him removal of penile prosthesis during this hospitalization, once transition to p o  Antibiotics  He refused, stating at this time he is not wanting to have surgery  We reviewed the risks of not explanting the prosthetic  He clearly verbalized understanding of this  I discussed with Dr Roberto Escobar of [address] [place] [address]  He will continue to discuss with patient throughout hospitalization  Neurogenic bladder  Assessment & Plan  With history of ileal cystoplasty augmentation in 2014  Maintained at home on CIC C3 times daily  Had difficulty catheterizing at home  RN and ER unable to place catheter  Bedside Dangelo placement with 18 Turkmen coude by attending 8/23/19  Ventral penile erosion noted and chronic, no actively infected or inflamed appearing tissue about the penis scrotum or perineum  Given suspicion of false passage on multiple attempts to place catheter, recommend maintaining dangelo to gravity drainage for 7 days  Do not remove before 8/30/2019, not nursing managed  At that point patient may resume CIC  Clinically improving, afebrile, leukocytosis improving  Treatment of UTI by primary team, appreciate ID input for antibiotic coverage- current coverage de-escalated to cefazolin with plans for PO transition today  Bedside rounds performed with RN  Discussed with Dr Roberto Escobar and Dr Lavern Ang   Urology remains available to perform explant when patient agreeable  Will continue to follow  Subjective/Objective     Subjective:   Griselda Seal feels well  No complaints relative to the dangelo catheter  No hematuria  No nausea or vomiting  Feeling and eating well  Offers no other complaints this morning  Review of Systems   Constitutional: Negative for activity change and appetite change  HENT: Negative for congestion and ear pain  Eyes: Negative for pain  Respiratory: Negative for cough and shortness of breath  Cardiovascular: Negative for chest pain and palpitations  Gastrointestinal: Negative for abdominal distention, abdominal pain, blood in stool, constipation, diarrhea and nausea  Genitourinary: Negative for difficulty urinating and dysuria  Musculoskeletal: Negative for arthralgias and myalgias  Skin: Negative for rash  Allergic/Immunologic: Negative for immunocompromised state  Neurological: Negative for dizziness and headaches  Hematological: Negative for adenopathy  Does not bruise/bleed easily  Psychiatric/Behavioral: Negative for agitation  The patient is not nervous/anxious  Objective:  Vitals: Blood pressure 120/72, pulse 98, temperature 99 4 °F (37 4 °C), temperature source Temporal, resp  rate 18, height 5' 4" (1 626 m), weight 83 9 kg (184 lb 15 5 oz), SpO2 94 %  ,Body mass index is 31 75 kg/m²  Intake/Output Summary (Last 24 hours) at 8/27/2019 1048  Last data filed at 8/27/2019 0534  Gross per 24 hour   Intake    Output 2225 ml   Net -2225 ml     Invasive Devices     Peripheral Intravenous Line            Peripheral IV 08/26/19 Left;Ventral (anterior) Hand less than 1 day          Drain            Colostomy Descending/sigmoid LLQ -- days    Urethral Catheter Coude 3 days                Physical Exam   Constitutional: He is oriented to person, place, and time  He appears well-developed and well-nourished  He is cooperative  He does not appear ill  No distress  63year old male, alert and oriented      HENT: Head: Normocephalic and atraumatic  Moist mucous membranes  Eyes: Conjunctivae and EOM are normal    Neck: Normal range of motion  Neck supple  No tracheal deviation present  Cardiovascular: Normal rate, regular rhythm and normal heart sounds  No murmur heard  Pulmonary/Chest: Effort normal and breath sounds normal  No respiratory distress  He has no wheezes  Good airflow bilaterally on deep inspiration  Abdominal: Soft  Bowel sounds are normal  He exhibits no distension and no mass  There is no tenderness  Abdomen, soft, left sided ostomy pink and viable with gas and stool in bag  Genitourinary:   Genitourinary Comments: Severe hypospadius erosion, dangelo draining clear yellow urine  Penile implant in place, nonfunctioning  Musculoskeletal:   R leg s/p disarticulation at hip    Neurological: He is alert and oriented to person, place, and time  Skin: He is not diaphoretic  Nursing note and vitals reviewed        History:    Past Medical History:   Diagnosis Date    Anemia     Blind     r eye    Chronic cystitis     Colostomy in place [facility])     Detrusor sphincter dyssynergia     Diabetes mellitus [address] )     Poorly controlled type 2; Last Assessed:  3/18/14    Erectile dysfunction     Frequency of urination     GERD (gastroesophageal reflux disease)     History of diabetes mellitus     History of osteomyelitis     Hx of leg amputation ([facility])     r high upper leg    Hyperlipidemia     Hypertension     Hypospadias     Incomplete bladder emptying     Neurogenic bladder     Paralysis ([facility])     Paraplegia ([facility])     Spinal cord cysts     Ulcer of sacral region ([address] )     Urge incontinence      Past Surgical History:   Procedure Laterality Date    AMPUTATION      At hip; Last Assessed:  1/19/16    BLADDER SURGERY      COLON SURGERY      llq ostomy pouch    COLOSTOMY      COMPLEX CYSTOMETROGRAM  2014    CYSTOSCOPY  2014    LEG AMPUTATION      MEATOTOMY      PENILE PROSTHESIS IMPLANT      VA ADJ TISS XFER SCALP,EXTREM 10 1-30 SQCM Left 2017    Procedure: POSTERIOR THIGH V-Y ADMANCEMENT;  Surgeon: Gurinder Goldman MD;  [facility];  Service: Plastics    VA MUSCLE-SKIN FLAP,TRUNK Left 2017    Procedure: FLAP CLOSURE LEFT ISCHIAL WOUND and "RIGHT" ISCHIAL FLAP ADVANCEMENT * DEBRIDEMENT, VAC PLACEMENT ;  Surgeon: Gurinder Goldman MD;  [facility];  Service: Plastics    VA MUSCLE-SKIN FLAP,TRUNK Left 2017    Procedure: gluteal myocutaneous rotational flap, posterior thigh v to y advancement- wound 5 x 2 5 x 8;  Surgeon: Gurinder Goldman MD;  [facility];  Service: Plastics    SPINE SURGERY      Lower back    UROFLOWMETRY SIMPLE / COMPLEX       Family History   Problem Relation Age of Onset    No Known Problems Mother     No Known Problems Father      Social History     Socioeconomic History    Marital status: /Civil Union     Spouse name: None    Number of children: None    Years of education: None    Highest education level: None   Occupational History    None   Social Needs    Financial resource strain: None    Food insecurity:     Worry: None     Inability: None    Transportation needs:     Medical: None     Non-medical: None   Tobacco Use    Smoking status: Former Smoker     Packs/day: 0 50     Years: 10 00     Pack years: 5 00     Last attempt to quit:      Years since quittin 6    Smokeless tobacco: Never Used    Tobacco comment: Onset date:  11/10/17   Substance and Sexual Activity    Alcohol use: Never     Frequency: Never     Comment: Per [employer]:  Social drinker (Onset date:  11/10/17)    Drug use: No    Sexual activity: None   Lifestyle    Physical activity:     Days per week: None     Minutes per session: None    Stress: None   Relationships    Social connections:     Talks on phone: None     Gets together: None     Attends Yarsani service: None     Active member of club or organization: None Attends meetings of clubs or organizations: None     Relationship status: None    Intimate partner violence:     Fear of current or ex partner: None     Emotionally abused: None     Physically abused: None     Forced sexual activity: None   Other Topics Concern    None   Social History Narrative    Native language Hungarian    Buddhism    Social history reviewed, unchanged       Labs:  Recent Labs     08/25/19  0519 08/26/19  0540 08/27/19  0558   WBC 10 74* 10 07 9 19     Recent Labs     08/25/19  0519 08/26/19  0540 08/27/19  0558   HGB 8 9* 9 2* 10 0*       Recent Labs     08/25/19  0519 08/26/19  0540 08/27/19  0558   CREATININE 0 44* 0 52* 0 56*     Walt Barrios PA-C  Date: 8/27/2019 Time: 10:48 AM

## 2019-08-27 NOTE — SOCIAL WORK
Met with pt to complete assessment and explained role  PCP is Dr Amando Belrtan  Pt lives in [address] with spouse in apartment with no steps to enter  Pt has [facility] 16 hours a day to assist with ADLs and pt uses electric w/c to get around  Pt has hx of leg amputation (higher upper leg), paralysis and paraplegia  DME:  Hospital bed, electric w/c, and shower chair  Pt is open with [facility] for RN and referral made back to them  [facility] for 16 hours a day to assist with ADLs  Pt uses [facility]  No hx of MH or drugs/alcohol   is spouse Shanna  Pt will need BLS and medical necessity form completed when discharge

## 2019-08-27 NOTE — PROGRESS NOTES
Progress Note - Infectious Disease   Larwencandrew Model Moscat 63 y o  male MRN: [id number]  Unit/Bed#: E4 -01 Encounter: [id number]      Impression/Plan:  1  Sepsis  POA   Hypotension, tachycardia, and low-grade fever  Likely secondary to complicated E coli urinary tract infection  Blood cultures are negative after 72 hours  Fortunately the patient is clinically stable and nontoxic  Today he is afebrile and his WBC count is trending down   -antibiotics as below  -monitor CBC and BMP  -follow up blood cultures  -monitor vitals  -supportive care     2  Complicated E coli urinary tract infection   Patient with history of difficulty with straight catheterization  [facility] is now status post Solomon catheter placement  He regularly produces bladder mucus and must have the Solomon catheter flushed regularly to ensure it continues to drain  Fortunately there is no evidence of pyelonephritis on his imaging  He was de-escalated to IV cefazolin and has tolerated without difficulty  Given patient's continued clinical improvement I recommend transitioning him to an oral antibiotic regimen today   -stop IV cefazolin  -transition to PO Keflex, 500mg q6 hours through 09/05/2019 for total 14 days of antibiotic treatment  -monitor CBC and BMP  -monitor vitals  -serial Solomon exams and care  -routine Solomon flushing per Urology  -continue close follow-up with Urology     3  Abnormal CT of the pelvis   Patient noted to have an abnormal CT showing air within the soft tissues within the perineum and potentially involving his penile prosthesis   His exam is not consistent with necrotizing infection  Patient has clinically improved since placement of Solomon and initiation of antibiotics   Urology and surgical evaluations appreciated  Urology is considering removing the penile prosthesis this week   -antibiotics as above  -recommend eventual penile prosthesis removal  -continue close follow-up with Urology     4   Chronic sacral/buttock ulcers  Surgical evaluation without signs of infection at ulcer sites  He is now following closely with wound management   -serial sacral and buttock exams  -continue close follow-up with wound management     5  Paraplegia   Additional supportive care as per primary     6  History of C diff   Patient will require PO vancomycin prophylaxis while on additional antibiotics above   -continue PO vancomycin prophylaxis through 2019 which is 72 hours after completion of antibiotics  -monitor GI symptoms  -monitor stool output in ostomy pouch    Discussed above plan in detail with Urology Mike BACA  Antibiotics:  Cefazolin - stop  PO Vancomycin 4  PO Keflex 1  Antibiotics 5    Subjective:  Patient reports he is feeling much better today  He has no acute complaints other than ongoing chronic pain in his buttock/sacral wounds  Has no concerns this Solomon catheter  He has no fever, chills, sweats, shakes; no nausea, vomiting, abdominal pain, diarrhea; no cough, shortness of breath, or chest pain  No new symptoms  Objective:  Vitals:  Temp:  [98 2 °F (36 8 °C)-99 5 °F (37 5 °C)] 99 4 °F (37 4 °C)  HR:  [98-99] 98  Resp:  [17-18] 18  BP: (107-120)/(62-72) 120/72  SpO2:  [94 %-99 %] 94 %  Temp (24hrs), Av °F (37 2 °C), Min:98 2 °F (36 8 °C), Max:99 5 °F (37 5 °C)  Current: Temperature: 99 4 °F (37 4 °C)    Physical Exam:   General Appearance:  Alert, interactive, nontoxic, no acute distress  Throat: Oropharynx moist without lesions  Poor dentition  Lungs:   Clear to auscultation bilaterally; no wheezes, rhonchi or rales; respirations unlabored   Heart:  RRR; no murmur, rub or gallop   Abdomen:   Soft, non-tender, non-distended, positive bowel sounds  Ostomy intact, stool is liquid and brown  Extremities: No clubbing or cyanosis, no edema   Skin: No new rashes, lesions, or draining wounds on exposed skin  Did not roll patient to assess his sacral/buttock dressings today       Labs, Imaging, & Other studies:   All pertinent labs and imaging studies were personally reviewed  Results from last 7 days   Lab Units 08/27/19  0558 08/26/19  0540 08/25/19  0519   WBC Thousand/uL 9 19 10 07 10 74*   HEMOGLOBIN g/dL 10 0* 9 2* 8 9*   PLATELETS Thousands/uL 479* 385 327     Results from last 7 days   Lab Units 08/27/19  0558  08/23/19  1211   POTASSIUM mmol/L 3 2*   < > 3 1*   CHLORIDE mmol/L 105   < > 94*   CO2 mmol/L 26   < > 24   BUN mg/dL 11   < > 8   CREATININE mg/dL 0 56*   < > 0 70   EGFR ml/min/1 73sq m 114   < > 104   CALCIUM mg/dL 9 2   < > 8 6   AST U/L  --   --  32   ALT U/L  --   --  20   ALK PHOS U/L  --   --  166*    < > = values in this interval not displayed  Results from last 7 days   Lab Units 08/23/19  1450 08/23/19  1226 08/23/19  1211   BLOOD CULTURE   --  No Growth at 72 hrs  No Growth at 72 hrs     URINE CULTURE  30,000-39,000 cfu/ml Escherichia coli*  --   --

## 2019-08-27 NOTE — PROGRESS NOTES
Progress Note Monty Naik 1960, 63 y o  male MRN: [id number]    Unit/Bed#: E4 -01 Encounter: [id number]    Primary Care Provider: Luis Elliott MD   Date and time admitted to hospital: 2019 11:55 AM        * Sepsis with hypotension [facility])  Assessment & Plan  Due to E  Coli UTI  Defer abx to ID, likely changing to po soon    Complicated urinary tract infection  Assessment & Plan  · Related to urinary retention/neurogenic bladder/penile implant  · Continue ceftriaxone  · Spoke with urology, recommend removal of nonfunctioning penile prosthesis, but patient is currently refusing    Stage IV pressure ulcer of sacral region [facility])  Assessment & Plan  · Present on admission  · With chronic osteomyelitis  · Daily packing and wound care per General surgery  · Reposition Q 2          Subjective:   Feels better today  No fever or chills  No abdominal pain  Objective:     Vitals:   Temp (24hrs), Av °F (37 2 °C), Min:98 2 °F (36 8 °C), Max:99 5 °F (37 5 °C)    Temp:  [98 2 °F (36 8 °C)-99 5 °F (37 5 °C)] 99 4 °F (37 4 °C)  HR:  [98-99] 98  Resp:  [17-18] 18  BP: (107-120)/(62-72) 120/72  SpO2:  [94 %-99 %] 94 %  Body mass index is 31 75 kg/m²  Input and Output Summary (last 24 hours): Intake/Output Summary (Last 24 hours) at 2019 1157  Last data filed at 2019 1030  Gross per 24 hour   Intake    Output 2100 ml   Net -2100 ml       Physical Exam:     Physical Exam   HENT:   Head: Normocephalic and atraumatic  Eyes: Pupils are equal, round, and reactive to light  EOM are normal    Cardiovascular: Normal rate and regular rhythm  Exam reveals no gallop and no friction rub  No murmur heard  Pulmonary/Chest: Effort normal and breath sounds normal  He has no wheezes  He has no rales  Abdominal: Soft  Bowel sounds are normal  There is no tenderness  Musculoskeletal: He exhibits no edema  Nursing note and vitals reviewed              Additional Data:     Labs:    Results from last 7 days   Lab Units 08/27/19  0558   WBC Thousand/uL 9 19   HEMOGLOBIN g/dL 10 0*   HEMATOCRIT % 32 7*   PLATELETS Thousands/uL 479*   NEUTROS PCT % 70   LYMPHS PCT % 20   MONOS PCT % 6   EOS PCT % 2     Results from last 7 days   Lab Units 08/27/19  0558  08/23/19  1211   POTASSIUM mmol/L 3 2*   < > 3 1*   CHLORIDE mmol/L 105   < > 94*   CO2 mmol/L 26   < > 24   BUN mg/dL 11   < > 8   CREATININE mg/dL 0 56*   < > 0 70   CALCIUM mg/dL 9 2   < > 8 6   ALK PHOS U/L  --   --  166*   ALT U/L  --   --  20   AST U/L  --   --  32    < > = values in this interval not displayed  Results from last 7 days   Lab Units 08/23/19  1211   INR  1 22*     Results from last 7 days   Lab Units 08/24/19  1705 08/24/19  0857   POC GLUCOSE mg/dl 128 95               * I Have Reviewed All Lab Data     Recent Cultures (last 7 days):     Results from last 7 days   Lab Units 08/23/19  1450 08/23/19  1226 08/23/19  1211   BLOOD CULTURE   --  No Growth at 72 hrs  No Growth at 72 hrs  URINE CULTURE  30,000-39,000 cfu/ml Escherichia coli*  --   --          Last 24 Hours Medication List:     Current Facility-Administered Medications:  acetaminophen 650 mg Oral Q6H PRN Odalis Walker PA-C    cefazolin 2,000 mg Intravenous KALPANA Triana Last Rate: 2,000 mg (08/27/19 1114)   enoxaparin 40 mg Subcutaneous Q24H [address] Jayde Jane MD    morphine injection 4 mg Intravenous Q4H PRN Cheyenne Jerome PA-C    oxyCODONE 20 mg Oral Q8H PRN Jayde Jane MD    pantoprazole 40 mg Oral Early Morning Jayde Jane MD    potassium chloride 40 mEq Oral BID Andrew Cain,     vancomycin 125 mg Oral Q12H [address] Anika Mane MD          VTE Pharmacologic Prophylaxis:   Pharmacologic: Enoxaparin (Lovenox)      Current Length of Stay: 4 day(s)    Current Patient Status: Inpatient       Discharge Plan:       Code Status: Level 1 - Full Code           Today, Patient Was Seen By: Marianne Garcia DO    ** Please Note: Dictation voice to text software may have been used in the creation of this document   **

## 2019-08-27 NOTE — PLAN OF CARE
Problem: Potential for Falls  Goal: Patient will remain free of falls  Description  INTERVENTIONS:  - Assess patient frequently for physical needs  -  Identify cognitive and physical deficits and behaviors that affect risk of falls  -  [place] fall precautions as indicated by assessment   - Educate patient/family on patient safety including physical limitations  - Instruct patient to call for assistance with activity based on assessment  - Modify environment to reduce risk of injury  - Consider OT/PT consult to assist with strengthening/mobility  Outcome: Progressing     Problem: Prexisting or High Potential for Compromised Skin Integrity  Goal: Skin integrity is maintained or improved  Description  INTERVENTIONS:  - Identify patients at risk for skin breakdown  - Assess and monitor skin integrity  - Assess and monitor nutrition and hydration status  - Monitor labs   - Assess for incontinence   - Turn and reposition patient  - Assist with mobility/ambulation  - Relieve pressure over bony prominences  - Avoid friction and shearing  - Provide appropriate hygiene as needed including keeping skin clean and dry  - Evaluate need for skin moisturizer/barrier cream  - Collaborate with interdisciplinary team   - Patient/family teaching  - Consider wound care consult   Outcome: Progressing     Problem: Nutrition/Hydration-ADULT  Goal: Nutrient/Hydration intake appropriate for improving, restoring or maintaining nutritional needs  Description  Monitor and assess patient's nutrition/hydration status for malnutrition  Collaborate with interdisciplinary team and initiate plan and interventions as ordered  Monitor patient's weight and dietary intake as ordered or per policy  Utilize nutrition screening tool and intervene as necessary  Determine patient's food preferences and provide high-protein, high-caloric foods as appropriate       INTERVENTIONS:  - Monitor oral intake, urinary output, labs, and treatment plans  - Assess nutrition and hydration status and recommend course of action  - Evaluate amount of meals eaten  - Assist patient with eating if necessary   - Allow adequate time for meals  - Recommend/ encourage appropriate diets, oral nutritional supplements, and vitamin/mineral supplements  - Order, calculate, and assess calorie counts as needed  - Recommend, monitor, and adjust tube feedings and TPN/PPN based on assessed needs  - Assess need for intravenous fluids  - Provide specific nutrition/hydration education as appropriate  - Include patient/family/caregiver in decisions related to nutrition  Outcome: Progressing     Problem: PAIN - ADULT  Goal: Verbalizes/displays adequate comfort level or baseline comfort level  Description  Interventions:  - Encourage patient to monitor pain and request assistance  - Assess pain using appropriate pain scale  - Administer analgesics based on type and severity of pain and evaluate response  - Implement non-pharmacological measures as appropriate and evaluate response  - Consider cultural and social influences on pain and pain management  - Notify physician/advanced practitioner if interventions unsuccessful or patient reports new pain  Outcome: Progressing     Problem: INFECTION - ADULT  Goal: Absence or prevention of progression during hospitalization  Description  INTERVENTIONS:  - Assess and monitor for signs and symptoms of infection  - Monitor lab/diagnostic results  - Monitor all insertion sites, i e  indwelling lines, tubes, and drains  - [place] appropriate cooling/warming therapies per order  - Administer medications as ordered  - Instruct and encourage patient and family to use good hand hygiene technique  - Identify and instruct in appropriate isolation precautions for identified infection/condition   Outcome: Progressing     Problem: SAFETY ADULT  Goal: Maintain or return to baseline ADL function  Description  INTERVENTIONS:  -  Assess patient's ability to carry out ADLs; assess patient's baseline for ADL function and identify physical deficits which impact ability to perform ADLs (bathing, care of mouth/teeth, toileting, grooming, dressing, etc )  - Assess/evaluate cause of self-care deficits   - Assess range of motion  - Assess patient's mobility; develop plan if impaired  - Assess patient's need for assistive devices and provide as appropriate  - Encourage maximum independence but intervene and supervise when necessary  - Involve family in performance of ADLs  - Assess for home care needs following discharge   - Consider OT consult to assist with ADL evaluation and planning for discharge  - Provide patient education as appropriate  Outcome: Progressing  Goal: Maintain or return mobility status to optimal level  Description  INTERVENTIONS:  - Assess patient's baseline mobility status (ambulation, transfers, stairs, etc )    - Identify cognitive and physical deficits and behaviors that affect mobility  - Identify mobility aids required to assist with transfers and/or ambulation (gait belt, sit-to-stand, lift, walker, cane, etc )  - [place] fall precautions as indicated by assessment  - Record patient progress and toleration of activity level on Mobility SBAR; progress patient to next Phase/Stage  - Instruct patient to call for assistance with activity based on assessment  - Consider rehabilitation consult to assist with strengthening/weightbearing, etc   Outcome: Progressing     Problem: DISCHARGE PLANNING  Goal: Discharge to home or other facility with appropriate resources  Description  INTERVENTIONS:  - Identify barriers to discharge w/patient and caregiver  - Arrange for needed discharge resources and transportation as appropriate  - Identify discharge learning needs (meds, wound care, etc )  - Refer to Case Management Department for coordinating discharge planning if the patient needs post-hospital services based on physician/advanced practitioner order or complex needs related to functional status, cognitive ability, or social support system   Outcome: Progressing     Problem: Knowledge Deficit  Goal: Patient/family/caregiver demonstrates understanding of disease process, treatment plan, medications, and discharge instructions  Description  Complete learning assessment and assess knowledge base    Interventions:  - Provide teaching at level of understanding  - Provide teaching via preferred learning methods  Outcome: Progressing     Problem: CARDIOVASCULAR - ADULT  Goal: Maintains optimal cardiac output and hemodynamic stability  Description  INTERVENTIONS:  - Monitor I/O, vital signs and rhythm  - Monitor for S/S and trends of decreased cardiac output  - Administer and titrate ordered vasoactive medications to optimize hemodynamic stability  - Assess quality of pulses, skin color and temperature  - Assess for signs of decreased coronary artery perfusion  - Instruct patient to report change in severity of symptoms  Outcome: Progressing  Goal: Absence of cardiac dysrhythmias or at baseline rhythm  Description  INTERVENTIONS:  - Continuous cardiac monitoring, vital signs, obtain 12 lead EKG if ordered  - Administer antiarrhythmic and heart rate control medications as ordered  - Monitor electrolytes and administer replacement therapy as ordered  Outcome: Progressing     Problem: GASTROINTESTINAL - ADULT  Goal: Maintains adequate nutritional intake  Description  INTERVENTIONS:  - Monitor percentage of each meal consumed  - Identify factors contributing to decreased intake, treat as appropriate  - Assist with meals as needed  - Monitor I&O, weight, and lab values if indicated  - Obtain nutrition services referral as needed  Outcome: Progressing  Goal: Establish and maintain optimal ostomy function  Description  INTERVENTIONS:  - Assess bowel function  - Encourage oral fluids to ensure adequate hydration  - Administer IV fluids if ordered to ensure adequate hydration   - Administer ordered medications as needed  - Encourage mobilization and activity  - Nutrition services referral to assist patient with appropriate food choices  - Assess stoma site  - Consider wound care consult   Outcome: Progressing     Problem: GENITOURINARY - ADULT  Goal: Maintains or returns to baseline urinary function  Description  INTERVENTIONS:  - Assess urinary function  - Encourage oral fluids to ensure adequate hydration if ordered  - Administer IV fluids as ordered to ensure adequate hydration  - Administer ordered medications as needed     Outcome: Progressing  Goal: Absence of urinary retention  Description  INTERVENTIONS:  - Assess patient's ability to void and empty bladder  - Monitor I/O  - Bladder scan as needed  - Discuss with physician/AP medications to alleviate retention as needed  - Discuss catheterization for long term situations as appropriate   Outcome: Progressing  Goal: Urinary catheter remains patent  Description  INTERVENTIONS:  - Assess patency of urinary catheter  - Follow guidelines for intermittent irrigation of non-functioning urinary catheter   Outcome: Progressing     Problem: METABOLIC, FLUID AND ELECTROLYTES - ADULT  Goal: Electrolytes maintained within normal limits  Description  INTERVENTIONS:  - Monitor labs and assess patient for signs and symptoms of electrolyte imbalances  - Administer electrolyte replacement as ordered  - Monitor response to electrolyte replacements, including repeat lab results as appropriate  - Instruct patient on fluid and nutrition as appropriate  Outcome: Progressing  Goal: Fluid balance maintained  Description  INTERVENTIONS:  - Monitor labs   - Monitor I/O and WT  - Instruct patient on fluid and nutrition as appropriate  - Assess for signs & symptoms of volume excess or deficit  Outcome: Progressing     Problem: SKIN/TISSUE INTEGRITY - ADULT  Goal: Skin integrity remains intact  Description  INTERVENTIONS  - Identify patients at risk for skin breakdown  - Assess and monitor skin integrity  - Assess and monitor nutrition and hydration status  - Monitor labs (i e  albumin)  - Assess for incontinence   - Turn and reposition patient  - Assist with mobility/ambulation  - Relieve pressure over bony prominences  - Avoid friction and shearing  - Provide appropriate hygiene as needed including keeping skin clean and dry  - Evaluate need for skin moisturizer/barrier cream  - Collaborate with interdisciplinary team (i e  Nutrition, Rehabilitation, etc )   - Patient/family teaching  Outcome: Progressing  Goal: Incision(s), wounds(s) or drain site(s) healing without S/S of infection  Description  INTERVENTIONS  - Assess and document risk factors for skin impairment   - Assess and document dressing, incision, wound bed, drain sites and surrounding tissue  - Consider nutrition services referral as needed  - Oral mucous membranes remain intact  - Provide patient/ family education  Outcome: Progressing  Goal: Oral mucous membranes remain intact  Description  INTERVENTIONS  - Assess oral mucosa and hygiene practices  - Implement preventative oral hygiene regimen  - Implement oral medicated treatments as ordered  - Initiate Nutrition services referral as needed  Outcome: Progressing     Problem: HEMATOLOGIC - ADULT  Goal: Maintains hematologic stability  Description  INTERVENTIONS  - Assess for signs and symptoms of bleeding or hemorrhage  - Monitor labs  - Administer supportive blood products/factors as ordered and appropriate  Outcome: Progressing     Problem: MUSCULOSKELETAL - ADULT  Goal: Maintain or return mobility to safest level of function  Description  INTERVENTIONS:  - Assess patient's ability to carry out ADLs; assess patient's baseline for ADL function and identify physical deficits which impact ability to perform ADLs (bathing, care of mouth/teeth, toileting, grooming, dressing, etc )  - Assess/evaluate cause of self-care deficits   - Assess range of motion  - Assess patient's mobility  - Assess patient's need for assistive devices and provide as appropriate  - Encourage maximum independence but intervene and supervise when necessary  - Involve family in performance of ADLs  - Assess for home care needs following discharge   - Consider OT consult to assist with ADL evaluation and planning for discharge  - Provide patient education as appropriate  Outcome: Progressing  Goal: Maintain proper alignment of affected body part  Description  INTERVENTIONS:  - Support, maintain and protect limb and body alignment  - Provide patient/ family with appropriate education  Outcome: Progressing

## 2019-08-28 LAB
ANION GAP SERPL CALCULATED.3IONS-SCNC: 9 MMOL/L (ref 4–13)
BACTERIA BLD CULT: NORMAL
BACTERIA BLD CULT: NORMAL
BUN SERPL-MCNC: 19 MG/DL (ref 5–25)
CALCIUM SERPL-MCNC: 9.6 MG/DL (ref 8.3–10.1)
CHLORIDE SERPL-SCNC: 104 MMOL/L (ref 100–108)
CO2 SERPL-SCNC: 26 MMOL/L (ref 21–32)
CREAT SERPL-MCNC: 0.56 MG/DL (ref 0.6–1.3)
ERYTHROCYTE [DISTWIDTH] IN BLOOD BY AUTOMATED COUNT: 17.9 % (ref 11.6–15.1)
GFR SERPL CREATININE-BSD FRML MDRD: 114 ML/MIN/1.73SQ M
GLUCOSE SERPL-MCNC: 93 MG/DL (ref 65–140)
HCT VFR BLD AUTO: 32.8 % (ref 36.5–49.3)
HGB BLD-MCNC: 9.9 G/DL (ref 12–17)
MAGNESIUM SERPL-MCNC: 1.8 MG/DL (ref 1.6–2.6)
MCH RBC QN AUTO: 24.4 PG (ref 26.8–34.3)
MCHC RBC AUTO-ENTMCNC: 30.2 G/DL (ref 31.4–37.4)
MCV RBC AUTO: 81 FL (ref 82–98)
PLATELET # BLD AUTO: 590 THOUSANDS/UL (ref 149–390)
PMV BLD AUTO: 9.9 FL (ref 8.9–12.7)
POTASSIUM SERPL-SCNC: 4.1 MMOL/L (ref 3.5–5.3)
RBC # BLD AUTO: 4.06 MILLION/UL (ref 3.88–5.62)
SODIUM SERPL-SCNC: 139 MMOL/L (ref 136–145)
WBC # BLD AUTO: 13.49 THOUSAND/UL (ref 4.31–10.16)

## 2019-08-28 PROCEDURE — 99232 SBSQ HOSP IP/OBS MODERATE 35: CPT | Performed by: PHYSICIAN ASSISTANT

## 2019-08-28 PROCEDURE — 85027 COMPLETE CBC AUTOMATED: CPT | Performed by: HOSPITALIST

## 2019-08-28 PROCEDURE — 80048 BASIC METABOLIC PNL TOTAL CA: CPT | Performed by: HOSPITALIST

## 2019-08-28 PROCEDURE — 99232 SBSQ HOSP IP/OBS MODERATE 35: CPT | Performed by: INTERNAL MEDICINE

## 2019-08-28 PROCEDURE — 83735 ASSAY OF MAGNESIUM: CPT | Performed by: HOSPITALIST

## 2019-08-28 PROCEDURE — 99232 SBSQ HOSP IP/OBS MODERATE 35: CPT | Performed by: HOSPITALIST

## 2019-08-28 RX ORDER — CEPHALEXIN 500 MG/1
500 CAPSULE ORAL EVERY 6 HOURS SCHEDULED
Status: DISCONTINUED | OUTPATIENT
Start: 2019-08-28 | End: 2019-08-29 | Stop reason: HOSPADM

## 2019-08-28 RX ADMIN — OXYCODONE HYDROCHLORIDE 20 MG: 10 TABLET ORAL at 06:05

## 2019-08-28 RX ADMIN — CEFAZOLIN SODIUM 2000 MG: 2 SOLUTION INTRAVENOUS at 01:58

## 2019-08-28 RX ADMIN — CEPHALEXIN 500 MG: 500 CAPSULE ORAL at 11:34

## 2019-08-28 RX ADMIN — VANCOMYCIN HYDROCHLORIDE 125 MG: 5 INJECTION, POWDER, LYOPHILIZED, FOR SOLUTION INTRAVENOUS at 11:34

## 2019-08-28 RX ADMIN — OXYCODONE HYDROCHLORIDE 20 MG: 10 TABLET ORAL at 14:53

## 2019-08-28 RX ADMIN — CEPHALEXIN 500 MG: 500 CAPSULE ORAL at 17:33

## 2019-08-28 RX ADMIN — CEPHALEXIN 500 MG: 500 CAPSULE ORAL at 23:02

## 2019-08-28 RX ADMIN — VANCOMYCIN HYDROCHLORIDE 125 MG: 5 INJECTION, POWDER, LYOPHILIZED, FOR SOLUTION INTRAVENOUS at 21:53

## 2019-08-28 RX ADMIN — SODIUM CHLORIDE 1000 ML: 0.9 INJECTION, SOLUTION INTRAVENOUS at 17:04

## 2019-08-28 RX ADMIN — POTASSIUM CHLORIDE 40 MEQ: 1500 TABLET, EXTENDED RELEASE ORAL at 11:34

## 2019-08-28 RX ADMIN — PANTOPRAZOLE SODIUM 40 MG: 40 TABLET, DELAYED RELEASE ORAL at 06:05

## 2019-08-28 RX ADMIN — OXYCODONE HYDROCHLORIDE 20 MG: 10 TABLET ORAL at 23:03

## 2019-08-28 RX ADMIN — ENOXAPARIN SODIUM 40 MG: 40 INJECTION SUBCUTANEOUS at 11:34

## 2019-08-28 NOTE — SOCIAL WORK
Pt was going to be discharge home with [facility] today, but discharge canc  CM completed the medical necessity form, but will need date updated and transport set up once pt is ready  RN reports BMAT level is 3  Patient plan of care for post acute placement discussed with multidisciplinary team and  leadership  MD is planning on discharging pt tomorrow  SLETS will  pt at 1030 am  MD and RN aware of  time  Tc to spouse to give  time and pt answer the phone and he will tell his spouse the  time as she only speaks [place]  The medical necessity form dates were changed to 8/29/19 and copy was put in binder to be given to GEOVANNI

## 2019-08-28 NOTE — PROGRESS NOTES
Progress Note - Infectious Disease   Patience Gordillo Moscat 63 y o  male MRN: [id number]  Unit/Bed#: E4 -01 Encounter: [id number]      Impression/Plan:  1  Sepsis  POA   Hypotension, tachycardia, and low-grade fever   Likely secondary to complicated E coli urinary tract infection   Blood cultures are negative after four days  Fortunately the patient is clinically stable and nontoxic   Today he is afebrile but his WBC count has increased slightly  Will recheck CBC tomorrow am   -antibiotics as below  -check CBC and monitor BMP tomorrow a m   -follow up blood cultures  -monitor vitals  -supportive care     2  Complicated E coli urinary tract infection   Patient with history of difficulty with straight catheterization  Jose Robin is now status post Solomon catheter placement   He regularly produces bladder mucus and must have the Solomon catheter flushed regularly to ensure it continues to drain   Fortunately there is no evidence of pyelonephritis on his imaging  He was transitioned to p o  Keflex and has been tolerating without difficulty  -continue  PO Keflex through 09/05/2019 for a total 14 days of antibiotic treatment  -monitor CBC and BMP  -monitor vitals  -serial Solomon exams and care  -routine Solomon flushing per Urology  -continue close follow-up with Urology     3  Abnormal CT of the pelvis   Patient noted to have an abnormal CT showing air within the soft tissues within the perineum and potentially involving his penile prosthesis   His exam is not consistent with necrotizing infection   Patient has clinically improved since placement of Solomon and initiation of antibiotics   Urology and surgical evaluations appreciated  Urology is considering removing the penile prosthesis this week   -antibiotics as above  -recommend eventual penile prosthesis removal  -continue close follow-up with Urology     4  Chronic sacral/buttock ulcers  Surgical evaluation without signs of infection at ulcer sites    He has ongoing mild pain in and around the wounds  [facility] is following closely with wound management   -serial sacral and buttock exams  -continue close follow-up with wound management     5  Paraplegia   Additional supportive care as per primary     6  History of C diff   Patient will require PO vancomycin prophylaxis while on additional antibiotics above  No abdominal pain or cramping today   -continue PO vancomycin prophylaxis through 2019 which is 72 hours after completion of antibiotics  -monitor GI symptoms  -monitor stool output in ostomy pouch    Antibiotics:  Keflex 2  Antibiotics 6    Subjective:  Patient reports he is feeling well today  States this is the best he has felt in a long time  He has no acute complaints  He denies fever, chills, sweats, shakes; no nausea, vomiting, abdominal pain; no cough, shortness of breath, or chest pain  No new symptoms  No concerns with his ostomy pouch  No concerns with his Solomon catheter  Objective:  Vitals:  Temp:  [97 2 °F (36 2 °C)-99 2 °F (37 3 °C)] 97 2 °F (36 2 °C)  HR:  [101-110] 110  Resp:  [18] 18  BP: (100-124)/(60-78) 100/60  SpO2:  [95 %-99 %] 95 %  Temp (24hrs), Av 5 °F (36 9 °C), Min:97 2 °F (36 2 °C), Max:99 2 °F (37 3 °C)  Current: Temperature: (!) 97 2 °F (36 2 °C)    Physical Exam:   General Appearance:  Alert, interactive, nontoxic, no acute distress  Throat: Oropharynx moist without lesions  Poor dentition  Lungs:   Clear to auscultation bilaterally; no wheezes, rhonchi or rales; respirations unlabored   Heart:  Tachycardic; no murmur, rub or gallop   Abdomen:   Soft, non-tender, non-distended, positive bowel sounds  Ostomy is intact, stool is liquid and brown  Extremities: No clubbing or cyanosis, no edema   Skin: No new rashes, lesions, or draining wounds on exposed skin  Did not roll patient to assess his sacral/buttock dressings today as he had recently been repositioned       Labs, Imaging, & Other studies:   All pertinent labs and imaging studies were personally reviewed  Results from last 7 days   Lab Units 08/28/19  0506 08/27/19  0558 08/26/19  0540   WBC Thousand/uL 13 49* 9 19 10 07   HEMOGLOBIN g/dL 9 9* 10 0* 9 2*   PLATELETS Thousands/uL 590* 479* 385     Results from last 7 days   Lab Units 08/28/19  0506  08/23/19  1211   POTASSIUM mmol/L 4 1   < > 3 1*   CHLORIDE mmol/L 104   < > 94*   CO2 mmol/L 26   < > 24   BUN mg/dL 19   < > 8   CREATININE mg/dL 0 56*   < > 0 70   EGFR ml/min/1 73sq m 114   < > 104   CALCIUM mg/dL 9 6   < > 8 6   AST U/L  --   --  32   ALT U/L  --   --  20   ALK PHOS U/L  --   --  166*    < > = values in this interval not displayed  Results from last 7 days   Lab Units 08/23/19  1450 08/23/19  1226 08/23/19  1211   BLOOD CULTURE   --  No Growth After 4 Days  No Growth After 4 Days     URINE CULTURE  30,000-39,000 cfu/ml Escherichia coli*  --   --

## 2019-08-28 NOTE — NURSING NOTE
Pt's vitals taken, per automatic BP, SBP was in 80s and HR was 130s  Dr Ayanna Klein made aware are manually vitals taken  NSS bolus ordered  Vitals taken manually  /60,   Dr Ayanna Klein made aware  Bolus to still be given per Dr Ayanna Klein

## 2019-08-28 NOTE — PLAN OF CARE
Problem: Potential for Falls  Goal: Patient will remain free of falls  Description  INTERVENTIONS:  - Assess patient frequently for physical needs  -  Identify cognitive and physical deficits and behaviors that affect risk of falls  -  [place] fall precautions as indicated by assessment   - Educate patient/family on patient safety including physical limitations  - Instruct patient to call for assistance with activity based on assessment  - Modify environment to reduce risk of injury  - Consider OT/PT consult to assist with strengthening/mobility  Outcome: Progressing     Problem: Prexisting or High Potential for Compromised Skin Integrity  Goal: Skin integrity is maintained or improved  Description  INTERVENTIONS:  - Identify patients at risk for skin breakdown  - Assess and monitor skin integrity  - Assess and monitor nutrition and hydration status  - Monitor labs   - Assess for incontinence   - Turn and reposition patient  - Assist with mobility/ambulation  - Relieve pressure over bony prominences  - Avoid friction and shearing  - Provide appropriate hygiene as needed including keeping skin clean and dry  - Evaluate need for skin moisturizer/barrier cream  - Collaborate with interdisciplinary team   - Patient/family teaching  - Consider wound care consult   Outcome: Progressing     Problem: Nutrition/Hydration-ADULT  Goal: Nutrient/Hydration intake appropriate for improving, restoring or maintaining nutritional needs  Description  Monitor and assess patient's nutrition/hydration status for malnutrition  Collaborate with interdisciplinary team and initiate plan and interventions as ordered  Monitor patient's weight and dietary intake as ordered or per policy  Utilize nutrition screening tool and intervene as necessary  Determine patient's food preferences and provide high-protein, high-caloric foods as appropriate       INTERVENTIONS:  - Monitor oral intake, urinary output, labs, and treatment plans  - Assess nutrition and hydration status and recommend course of action  - Evaluate amount of meals eaten  - Assist patient with eating if necessary   - Allow adequate time for meals  - Recommend/ encourage appropriate diets, oral nutritional supplements, and vitamin/mineral supplements  - Order, calculate, and assess calorie counts as needed  - Recommend, monitor, and adjust tube feedings and TPN/PPN based on assessed needs  - Assess need for intravenous fluids  - Provide specific nutrition/hydration education as appropriate  - Include patient/family/caregiver in decisions related to nutrition  Outcome: Progressing     Problem: PAIN - ADULT  Goal: Verbalizes/displays adequate comfort level or baseline comfort level  Description  Interventions:  - Encourage patient to monitor pain and request assistance  - Assess pain using appropriate pain scale  - Administer analgesics based on type and severity of pain and evaluate response  - Implement non-pharmacological measures as appropriate and evaluate response  - Consider cultural and social influences on pain and pain management  - Notify physician/advanced practitioner if interventions unsuccessful or patient reports new pain  Outcome: Progressing     Problem: INFECTION - ADULT  Goal: Absence or prevention of progression during hospitalization  Description  INTERVENTIONS:  - Assess and monitor for signs and symptoms of infection  - Monitor lab/diagnostic results  - Monitor all insertion sites, i e  indwelling lines, tubes, and drains  - [place] appropriate cooling/warming therapies per order  - Administer medications as ordered  - Instruct and encourage patient and family to use good hand hygiene technique  - Identify and instruct in appropriate isolation precautions for identified infection/condition   Outcome: Progressing     Problem: SAFETY ADULT  Goal: Maintain or return to baseline ADL function  Description  INTERVENTIONS:  -  Assess patient's ability to carry out ADLs; assess patient's baseline for ADL function and identify physical deficits which impact ability to perform ADLs (bathing, care of mouth/teeth, toileting, grooming, dressing, etc )  - Assess/evaluate cause of self-care deficits   - Assess range of motion  - Assess patient's mobility; develop plan if impaired  - Assess patient's need for assistive devices and provide as appropriate  - Encourage maximum independence but intervene and supervise when necessary  - Involve family in performance of ADLs  - Assess for home care needs following discharge   - Consider OT consult to assist with ADL evaluation and planning for discharge  - Provide patient education as appropriate  Outcome: Progressing  Goal: Maintain or return mobility status to optimal level  Description  INTERVENTIONS:  - Assess patient's baseline mobility status (ambulation, transfers, stairs, etc )    - Identify cognitive and physical deficits and behaviors that affect mobility  - Identify mobility aids required to assist with transfers and/or ambulation (gait belt, sit-to-stand, lift, walker, cane, etc )  - [place] fall precautions as indicated by assessment  - Record patient progress and toleration of activity level on Mobility SBAR; progress patient to next Phase/Stage  - Instruct patient to call for assistance with activity based on assessment  - Consider rehabilitation consult to assist with strengthening/weightbearing, etc   Outcome: Progressing     Problem: DISCHARGE PLANNING  Goal: Discharge to home or other facility with appropriate resources  Description  INTERVENTIONS:  - Identify barriers to discharge w/patient and caregiver  - Arrange for needed discharge resources and transportation as appropriate  - Identify discharge learning needs (meds, wound care, etc )  - Refer to Case Management Department for coordinating discharge planning if the patient needs post-hospital services based on physician/advanced practitioner order or complex needs related to functional status, cognitive ability, or social support system   Outcome: Progressing     Problem: Knowledge Deficit  Goal: Patient/family/caregiver demonstrates understanding of disease process, treatment plan, medications, and discharge instructions  Description  Complete learning assessment and assess knowledge base    Interventions:  - Provide teaching at level of understanding  - Provide teaching via preferred learning methods  Outcome: Progressing     Problem: CARDIOVASCULAR - ADULT  Goal: Maintains optimal cardiac output and hemodynamic stability  Description  INTERVENTIONS:  - Monitor I/O, vital signs and rhythm  - Monitor for S/S and trends of decreased cardiac output  - Administer and titrate ordered vasoactive medications to optimize hemodynamic stability  - Assess quality of pulses, skin color and temperature  - Assess for signs of decreased coronary artery perfusion  - Instruct patient to report change in severity of symptoms  Outcome: Progressing  Goal: Absence of cardiac dysrhythmias or at baseline rhythm  Description  INTERVENTIONS:  - Continuous cardiac monitoring, vital signs, obtain 12 lead EKG if ordered  - Administer antiarrhythmic and heart rate control medications as ordered  - Monitor electrolytes and administer replacement therapy as ordered  Outcome: Progressing     Problem: GASTROINTESTINAL - ADULT  Goal: Maintains adequate nutritional intake  Description  INTERVENTIONS:  - Monitor percentage of each meal consumed  - Identify factors contributing to decreased intake, treat as appropriate  - Assist with meals as needed  - Monitor I&O, weight, and lab values if indicated  - Obtain nutrition services referral as needed  Outcome: Progressing  Goal: Establish and maintain optimal ostomy function  Description  INTERVENTIONS:  - Assess bowel function  - Encourage oral fluids to ensure adequate hydration  - Administer IV fluids if ordered to ensure adequate hydration   - Administer ordered medications as needed  - Encourage mobilization and activity  - Nutrition services referral to assist patient with appropriate food choices  - Assess stoma site  - Consider wound care consult   Outcome: Progressing     Problem: GENITOURINARY - ADULT  Goal: Maintains or returns to baseline urinary function  Description  INTERVENTIONS:  - Assess urinary function  - Encourage oral fluids to ensure adequate hydration if ordered  - Administer IV fluids as ordered to ensure adequate hydration  - Administer ordered medications as needed     Outcome: Progressing  Goal: Absence of urinary retention  Description  INTERVENTIONS:  - Assess patient's ability to void and empty bladder  - Monitor I/O  - Bladder scan as needed  - Discuss with physician/AP medications to alleviate retention as needed  - Discuss catheterization for long term situations as appropriate   Outcome: Progressing  Goal: Urinary catheter remains patent  Description  INTERVENTIONS:  - Assess patency of urinary catheter  - Follow guidelines for intermittent irrigation of non-functioning urinary catheter   Outcome: Progressing     Problem: METABOLIC, FLUID AND ELECTROLYTES - ADULT  Goal: Electrolytes maintained within normal limits  Description  INTERVENTIONS:  - Monitor labs and assess patient for signs and symptoms of electrolyte imbalances  - Administer electrolyte replacement as ordered  - Monitor response to electrolyte replacements, including repeat lab results as appropriate  - Instruct patient on fluid and nutrition as appropriate  Outcome: Progressing  Goal: Fluid balance maintained  Description  INTERVENTIONS:  - Monitor labs   - Monitor I/O and WT  - Instruct patient on fluid and nutrition as appropriate  - Assess for signs & symptoms of volume excess or deficit  Outcome: Progressing     Problem: SKIN/TISSUE INTEGRITY - ADULT  Goal: Skin integrity remains intact  Description  INTERVENTIONS  - Identify patients at risk for skin breakdown  - Assess and monitor skin integrity  - Assess and monitor nutrition and hydration status  - Monitor labs (i e  albumin)  - Assess for incontinence   - Turn and reposition patient  - Assist with mobility/ambulation  - Relieve pressure over bony prominences  - Avoid friction and shearing  - Provide appropriate hygiene as needed including keeping skin clean and dry  - Evaluate need for skin moisturizer/barrier cream  - Collaborate with interdisciplinary team (i e  Nutrition, Rehabilitation, etc )   - Patient/family teaching  Outcome: Progressing  Goal: Incision(s), wounds(s) or drain site(s) healing without S/S of infection  Description  INTERVENTIONS  - Assess and document risk factors for skin impairment   - Assess and document dressing, incision, wound bed, drain sites and surrounding tissue  - Consider nutrition services referral as needed  - Oral mucous membranes remain intact  - Provide patient/ family education  Outcome: Progressing  Goal: Oral mucous membranes remain intact  Description  INTERVENTIONS  - Assess oral mucosa and hygiene practices  - Implement preventative oral hygiene regimen  - Implement oral medicated treatments as ordered  - Initiate Nutrition services referral as needed  Outcome: Progressing     Problem: HEMATOLOGIC - ADULT  Goal: Maintains hematologic stability  Description  INTERVENTIONS  - Assess for signs and symptoms of bleeding or hemorrhage  - Monitor labs  - Administer supportive blood products/factors as ordered and appropriate  Outcome: Progressing     Problem: MUSCULOSKELETAL - ADULT  Goal: Maintain or return mobility to safest level of function  Description  INTERVENTIONS:  - Assess patient's ability to carry out ADLs; assess patient's baseline for ADL function and identify physical deficits which impact ability to perform ADLs (bathing, care of mouth/teeth, toileting, grooming, dressing, etc )  - Assess/evaluate cause of self-care deficits   - Assess range of motion  - Assess patient's mobility  - Assess patient's need for assistive devices and provide as appropriate  - Encourage maximum independence but intervene and supervise when necessary  - Involve family in performance of ADLs  - Assess for home care needs following discharge   - Consider OT consult to assist with ADL evaluation and planning for discharge  - Provide patient education as appropriate  Outcome: Progressing  Goal: Maintain proper alignment of affected body part  Description  INTERVENTIONS:  - Support, maintain and protect limb and body alignment  - Provide patient/ family with appropriate education  Outcome: Progressing

## 2019-08-28 NOTE — PROGRESS NOTES
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, subsequent encounter  Assessment & Plan  IPP in place, nonfunctioning  I again reviewed with him today that given his sacral decubitus ulcer, he is at extremely high risk for erosion and infection of this prosthesis  We reviewed that if this becomes truly infected, he could become septic and have a life-threatening illness secondary to that  Fortunately he has improved with IV antibiotics this hospitalization but moving forward may have more complications  I offered him removal of penile prosthesis during this hospitalization, once transition to p o  Antibiotics  He today again refused, stating at this time he is not wanting to have surgery  We reviewed the risks of not explanting the prosthetic  He clearly verbalized understanding of this  I discussed with Dr Nilda Brown of [address]  He will continue to discuss with patient throughout hospitalization  Neurogenic bladder  Assessment & Plan  With history of ileal cystoplasty augmentation in 2014  Maintained at home on CIC C3 times daily  Had difficulty catheterizing at home  RN and ER unable to place catheter  Bedside Dangelo placement with [address] [place] coude by attending 8/23/19  Ventral penile erosion noted and chronic, no actively infected or inflamed appearing tissue about the penis scrotum or perineum  Given suspicion of false passage on multiple attempts to place catheter, recommend maintaining dangelo to gravity drainage for 7 days  Do not remove before 8/30/2019, not nursing managed  At that point patient may resume CIC  Clinically improving, afebrile, leukocytosis improving  Treatment of UTI by primary team, appreciate ID input for antibiotic coverage- current coverage de-escalated to cefazolin with plans for PO transition today      Bedside rounds performed with [facility] wound care  Discussed with Dr Christofer Huber  Urology will continue to follow  Subjective:   HPI:  Feeling good today  He has no current complaints aside from his chronic back pain  No discomfort in the groin  Tolerating Solomon catheter okay  He feels comfortable resuming CIC on Friday after Solomon removal   He again declines multiple offers for surgical explantation of his IPP  I have encouraged him to continue to consider this weather inpatient or outpatient, will continue to follow him  Review of Systems   Constitutional: Negative  Respiratory: Negative  Cardiovascular: Negative  Genitourinary: Positive for difficulty urinating  Negative for decreased urine volume, dysuria, flank pain, frequency, hematuria and urgency  Musculoskeletal: Positive for back pain and gait problem  Skin: Positive for wound  Objective:  Nursing Rounds: No overnight issues  Solomon draining clear yellow  Wound care unchanged  Vitals: Blood pressure 100/60, pulse (!) 110, temperature (!) 97 2 °F (36 2 °C), temperature source Tympanic, resp  rate 18, height 5' 4" (1 626 m), weight 83 9 kg (184 lb 15 5 oz), SpO2 95 %  ,Body mass index is 31 75 kg/m²  Intake/Output Summary (Last 24 hours) at 8/28/2019 1404  Last data filed at 8/28/2019 0036  Gross per 24 hour   Intake    Output 1900 ml   Net -1900 ml     Invasive Devices     Peripheral Intravenous Line            Peripheral IV 08/26/19 Left;Ventral (anterior) Hand 1 day          Drain            Colostomy Descending/sigmoid LLQ -- days    Urethral Catheter Coude 4 days                Physical Exam   Constitutional: He is oriented to person, place, and time  He appears well-developed and well-nourished  No distress  HENT:   Head: Normocephalic and atraumatic  Cardiovascular: Normal rate, regular rhythm and normal heart sounds  No murmur heard  Pulmonary/Chest: Effort normal and breath sounds normal    Abdominal: Soft   Bowel sounds are normal  He exhibits no distension  There is no tenderness  Genitourinary:   Genitourinary Comments: Penis circumcised, ventral penile erosion noted, Solomon catheter per urethra draining clear yellow urine  IPP pump in left hemiscrotum  No erythema edema, crepitus or tenderness to the penis, scrotum, perineal area   Musculoskeletal: He exhibits no edema  Right AKA   Neurological: He is alert and oriented to person, place, and time  Skin: Skin is warm  Capillary refill takes less than 2 seconds  He is not diaphoretic  Large/deep sacral decubitus ulcer- wound is dressed   Nursing note and vitals reviewed        History:    Past Medical History:   Diagnosis Date    Anemia     Blind     r eye    Chronic cystitis     Colostomy in place [facility])     Detrusor sphincter dyssynergia     Diabetes mellitus ([facility] )     Poorly controlled type 2; Last Assessed:  3/18/14    Erectile dysfunction     Frequency of urination     GERD (gastroesophageal reflux disease)     History of diabetes mellitus     History of osteomyelitis     Hx of leg amputation [facility])     r high upper leg    Hyperlipidemia     Hypertension     Hypospadias     Incomplete bladder emptying     Neurogenic bladder     Paralysis ([facility])     Paraplegia ([facility])     Spinal cord cysts     Ulcer of sacral region ([facility] )     Urge incontinence      Past Surgical History:   Procedure Laterality Date    AMPUTATION      At hip; Last Assessed:  1/19/16    BLADDER SURGERY      COLON SURGERY      llq ostomy pouch    COLOSTOMY      COMPLEX CYSTOMETROGRAM  2014    CYSTOSCOPY  2014    LEG AMPUTATION      MEATOTOMY      PENILE PROSTHESIS IMPLANT  2011    ND ADJ TISS XFER SCALP,EXTREM 10 1-30 SQCM Left 5/1/2017    Procedure: POSTERIOR THIGH V-Y ADMANCEMENT;  Surgeon: Merlinda Lieu, MD;  [facility];  Service: Plastics    ND MUSCLE-SKIN FLAP,TRUNK Left 5/1/2017    Procedure: FLAP CLOSURE LEFT ISCHIAL WOUND and "RIGHT" ISCHIAL FLAP ADVANCEMENT * DEBRIDEMENT, [place] ;  Surgeon: Kirsty Cat MD;  [facility];  Service: Plastics    AL MUSCLE-SKIN FLAP,TRUNK Left 2017    Procedure: gluteal myocutaneous rotational flap, posterior thigh v to y advancement- wound 5 x 2 5 x 8;  Surgeon: Kirsty Cat MD;  [facility];  Service: Plastics    SPINE SURGERY      Lower back    UROFLOWMETRY SIMPLE / COMPLEX       Family History   Problem Relation Age of Onset    No Known Problems Mother     No Known Problems Father      Social History     Socioeconomic History    Marital status: /Civil Union     Spouse name: None    Number of children: None    Years of education: None    Highest education level: None   Occupational History    None   Social Needs    Financial resource strain: None    Food insecurity:     Worry: None     Inability: None    Transportation needs:     Medical: None     Non-medical: None   Tobacco Use    Smoking status: Former Smoker     Packs/day: 0 50     Years: 10 00     Pack years: 5 00     Last attempt to quit: [address] [place] [address]     Years since quittin 6    Smokeless tobacco: Never Used    Tobacco comment: Onset date:  11/10/17   Substance and Sexual Activity    Alcohol use: Never     Frequency: Never     Comment: Per [employer]:  Social drinker (Onset date:  11/10/17)    Drug use: No    Sexual activity: None   Lifestyle    Physical activity:     Days per week: None     Minutes per session: None    Stress: None   Relationships    Social connections:     Talks on phone: None     Gets together: None     Attends Jainism service: None     Active member of club or organization: None     Attends meetings of clubs or organizations: None     Relationship status: None    Intimate partner violence:     Fear of current or ex partner: None     Emotionally abused: None     Physically abused: None     Forced sexual activity: None   Other Topics Concern    None   Social History Narrative    Native language [address] [facility] history reviewed, unchanged       Labs:  Recent Labs     08/26/19  0540 08/27/19  0558 08/28/19  0506   WBC 10 07 9 19 13 49*       Recent Labs     08/26/19  0540 08/27/19  0558 08/28/19  0506   HGB 9 2* 10 0* 9 9*     Recent Labs     08/26/19  0540 08/27/19  0558 08/28/19  0506   HCT 29 2* 32 7* 32 8*     Recent Labs     08/26/19  0540 08/27/19  0558 08/28/19  0506   CREATININE 0 52* 0 56* 0 56*         Tj Segundo PA-C  Date: 8/28/2019 Time: 2:04 PM

## 2019-08-28 NOTE — PROGRESS NOTES
Progress Note Jonatan Up 1960, 63 y o  male MRN: [id number]    Unit/Bed#: E4 -01 Encounter: [id number]    Primary Care Provider: Nigel Plascencia MD   Date and time admitted to hospital: 2019 11:55 AM        * Sepsis with hypotension [facility])  Assessment & Plan  Due to E  Coli UTI  Changing to oral abx    Complicated urinary tract infection  Assessment & Plan  Change to oral abx          Subjective:   Feels ok, but his blood pressure is running low  No light headedness  No fever or chills  Objective:     Vitals:   Temp (24hrs), Av 3 °F (36 8 °C), Min:97 2 °F (36 2 °C), Max:99 1 °F (37 3 °C)    Temp:  [97 2 °F (36 2 °C)-99 1 °F (37 3 °C)] 98 5 °F (36 9 °C)  HR:  [106-110] 106  Resp:  [18] 18  BP: (100-106)/(60-70) 102/60  SpO2:  [95 %-99 %] 96 %  Body mass index is 31 75 kg/m²  Input and Output Summary (last 24 hours): Intake/Output Summary (Last 24 hours) at 2019 1645  Last data filed at 2019 0036  Gross per 24 hour   Intake    Output 900 ml   Net -900 ml       Physical Exam:     Physical Exam   HENT:   Head: Normocephalic and atraumatic  Eyes: Pupils are equal, round, and reactive to light  EOM are normal    Cardiovascular: Normal rate and regular rhythm  Exam reveals no gallop and no friction rub  No murmur heard  Pulmonary/Chest: Effort normal and breath sounds normal  He has no wheezes  He has no rales  Abdominal: Soft  Bowel sounds are normal  There is no tenderness  Musculoskeletal: He exhibits no edema  Nursing note and vitals reviewed              Additional Data:     Labs:    Results from last 7 days   Lab Units 19  0506 19  0558   WBC Thousand/uL 13 49* 9 19   HEMOGLOBIN g/dL 9 9* 10 0*   HEMATOCRIT % 32 8* 32 7*   PLATELETS Thousands/uL 590* 479*   NEUTROS PCT %  --  70   LYMPHS PCT %  --  20   MONOS PCT %  --  6   EOS PCT %  --  2     Results from last 7 days   Lab Units 19  0506  19  1211   POTASSIUM mmol/L 4 1   < > 3 1*   CHLORIDE mmol/L 104   < > 94*   CO2 mmol/L 26   < > 24   BUN mg/dL 19   < > 8   CREATININE mg/dL 0 56*   < > 0 70   CALCIUM mg/dL 9 6   < > 8 6   ALK PHOS U/L  --   --  166*   ALT U/L  --   --  20   AST U/L  --   --  32    < > = values in this interval not displayed  Results from last 7 days   Lab Units 08/23/19  1211   INR  1 22*     Results from last 7 days   Lab Units 08/24/19  1705 08/24/19  0857   POC GLUCOSE mg/dl 128 95               * I Have Reviewed All Lab Data     Recent Cultures (last 7 days):     Results from last 7 days   Lab Units 08/23/19  1450 08/23/19  1226 08/23/19  1211   BLOOD CULTURE   --  No Growth After 4 Days  No Growth After 4 Days  URINE CULTURE  30,000-39,000 cfu/ml Escherichia coli*  --   --          Last 24 Hours Medication List:     Current Facility-Administered Medications:  acetaminophen 650 mg Oral Q6H PRN Debbi Nascimento PA-C   cephalexin 500 mg Oral Q6H [facility] HOME Malathi Villalobos MD   enoxaparin 40 mg Subcutaneous Q24H [facility] Jena Engle MD   morphine injection 4 mg Intravenous Q4H PRN Debbi Nascimento PA-C   oxyCODONE 20 mg Oral Q8H PRN Jena Engle MD   pantoprazole 40 mg Oral Early Morning Jena Engle MD   sodium chloride 1,000 mL Intravenous Once Andrew Cain DO   vancomycin 125 mg Oral Q12H [facility] Shellie Banda MD         VTE Pharmacologic Prophylaxis:   Pharmacologic: Enoxaparin (Lovenox)      Current Length of Stay: 5 day(s)    Current Patient Status: Inpatient       Discharge Plan: home tomorrow  Setting up transport    Code Status: Level 1 - Full Code           Today, Patient Was Seen By: Lizzie Eller DO    ** Please Note: Dictation voice to text software may have been used in the creation of this document   **

## 2019-08-28 NOTE — TRANSPORTATION MEDICAL NECESSITY
Section I - General Information    Name of Patient: Edith Figueroa                 : 1960    Medicare #: [id number]  Transport Date: 19 (PCS is valid for round trips on this date and for all repetitive trips in the 60-day range as noted below )  Origin: [address]                                                         Destination:  [address] [place] [address] [address], [address] [place]  Is the pt's stay covered under Medicare Part A (PPS/DRG)   [x]     Closest appropriate facility? If no, why is transport to more distant facility required? Yes  If hospice pt, is this transport related to pt's terminal illness? NA       Section II - Medical Necessity Questionnaire  Ambulance transportation is medically necessary only if other means of transport are contraindicated or would be potentially harmful to the patient  To meet this requirement, the patient must either be "bed confined" or suffer from a condition such that transport by means other than ambulance is contraindicated by the patient's condition  The following questions must be answered by the medical professional signing below for this form to be valid:    1)  Describe the MEDICAL CONDITION (physical and/or mental) of this patient AT [address] [place] [address] that requires the patient to be transported in an ambulance and why transport by other means is contraindicated by the patient's condition: Patient has stage 4 pressure ulcer of sacral region, deep tissue infection of the perineal area, paraplegia secondary to spinal cysts, paralysis with deformities of the limbs including right lower extremity amputation, MRSA, pain is 6 out of 10, and he is unable to walk  2) Is the patient "bed confined" as defined below?      No   To be "be confined" the patient must satisfy all three of the following conditions: (1) unable to get up from bed without Assistance; AND (2) unable to ambulate; AND (3) unable to sit in a chair or wheelchair  3) Can this patient safely be transported by car or wheelchair van (i e , seated during transport without a medical attendant or monitoring)? No    4) In addition to completing questions 1-3 above, please check any of the following conditions that apply*:   *Note: supporting documentation for any boxes checked must be maintained in the patient's medical records  If hosp-hosp transfer, describe services needed at 2nd facility not available at 1st facility? Moderate/severe pain on movement   Medical attendant required   Special handling/isolation/infection control precautions required   Unable to tolerate seated position for time needed to transport   Unable to sit in a chair or wheelchair due to decubitus ulcers or other wounds       Section III - Signature of Physician or Healthcare Professional  I certify that the above information is true and correct based on my evaluation of this patient, and represent that the patient requires transport by ambulance and that other forms of transport are contraindicated  I understand that this information will be used by the Centers for Medicare and Medicaid Services (CMS) to support the determination of medical necessity for ambulance services, and I represent that I have personal knowledge of the patient's condition at time of transport  []  If this box is checked, I also certify that the patient is physically or mentally incapable of signing the ambulance service's claim and that the institution with which I am affiliated has furnished care, services, or assistance to the patient  My signature below is made on behalf of the patient pursuant to 42 CFR §424 36(b)(4)  In accordance with 42 CFR §424 37, the specific reason(s) that the patient is physically or mentally incapable of signing the claim form is as follows        Signature of Physician* or Healthcare Professional___CEHT Luo, MILY___________________________________________________________  Signature Date 8/29/19 (For scheduled repetitive transports, this form is not valid for transports performed more than 60 days after this date)    Printed Name & Credentials of Physician or Healthcare Professional (MD, DO, RN, etc )____Susan Cheril Mortimer, MSW, MILY____________________________  *Form must be signed by patient's attending physician for scheduled, repetitive transports   For non-repetitive, unscheduled ambulance transports, if unable to obtain the signature of the attending physician, any of the following may sign (choose appropriate option below)  [] Physician Assistant []  Clinical Nurse Specialist []  Registered Nurse  []  Nurse Practitioner  [x] Discharge Planner

## 2019-08-29 ENCOUNTER — TELEPHONE (OUTPATIENT)
Dept: OTHER | Facility: HOSPITAL | Age: 59
End: 2019-08-29

## 2019-08-29 VITALS
RESPIRATION RATE: 18 BRPM | SYSTOLIC BLOOD PRESSURE: 124 MMHG | BODY MASS INDEX: 31.58 KG/M2 | HEIGHT: 64 IN | OXYGEN SATURATION: 98 % | DIASTOLIC BLOOD PRESSURE: 74 MMHG | WEIGHT: 184.97 LBS | HEART RATE: 109 BPM | TEMPERATURE: 99 F

## 2019-08-29 LAB
ANION GAP SERPL CALCULATED.3IONS-SCNC: 9 MMOL/L (ref 4–13)
BASOPHILS # BLD AUTO: 0.06 THOUSANDS/ΜL (ref 0–0.1)
BASOPHILS NFR BLD AUTO: 0 % (ref 0–1)
BUN SERPL-MCNC: 16 MG/DL (ref 5–25)
CALCIUM SERPL-MCNC: 9.8 MG/DL (ref 8.3–10.1)
CHLORIDE SERPL-SCNC: 103 MMOL/L (ref 100–108)
CO2 SERPL-SCNC: 27 MMOL/L (ref 21–32)
CREAT SERPL-MCNC: 0.65 MG/DL (ref 0.6–1.3)
EOSINOPHIL # BLD AUTO: 0.41 THOUSAND/ΜL (ref 0–0.61)
EOSINOPHIL NFR BLD AUTO: 3 % (ref 0–6)
ERYTHROCYTE [DISTWIDTH] IN BLOOD BY AUTOMATED COUNT: 18.5 % (ref 11.6–15.1)
GFR SERPL CREATININE-BSD FRML MDRD: 107 ML/MIN/1.73SQ M
GLUCOSE SERPL-MCNC: 113 MG/DL (ref 65–140)
HCT VFR BLD AUTO: 39.8 % (ref 36.5–49.3)
HGB BLD-MCNC: 12 G/DL (ref 12–17)
IMM GRANULOCYTES # BLD AUTO: 0.15 THOUSAND/UL (ref 0–0.2)
IMM GRANULOCYTES NFR BLD AUTO: 1 % (ref 0–2)
LYMPHOCYTES # BLD AUTO: 2.15 THOUSANDS/ΜL (ref 0.6–4.47)
LYMPHOCYTES NFR BLD AUTO: 15 % (ref 14–44)
MCH RBC QN AUTO: 24.5 PG (ref 26.8–34.3)
MCHC RBC AUTO-ENTMCNC: 30.2 G/DL (ref 31.4–37.4)
MCV RBC AUTO: 81 FL (ref 82–98)
MONOCYTES # BLD AUTO: 0.61 THOUSAND/ΜL (ref 0.17–1.22)
MONOCYTES NFR BLD AUTO: 4 % (ref 4–12)
NEUTROPHILS # BLD AUTO: 11.47 THOUSANDS/ΜL (ref 1.85–7.62)
NEUTS SEG NFR BLD AUTO: 77 % (ref 43–75)
NRBC BLD AUTO-RTO: 0 /100 WBCS
PLATELET # BLD AUTO: 756 THOUSANDS/UL (ref 149–390)
PMV BLD AUTO: 9.4 FL (ref 8.9–12.7)
POTASSIUM SERPL-SCNC: 4.1 MMOL/L (ref 3.5–5.3)
RBC # BLD AUTO: 4.89 MILLION/UL (ref 3.88–5.62)
SODIUM SERPL-SCNC: 139 MMOL/L (ref 136–145)
WBC # BLD AUTO: 14.85 THOUSAND/UL (ref 4.31–10.16)

## 2019-08-29 PROCEDURE — 99232 SBSQ HOSP IP/OBS MODERATE 35: CPT | Performed by: INTERNAL MEDICINE

## 2019-08-29 PROCEDURE — 99239 HOSP IP/OBS DSCHRG MGMT >30: CPT | Performed by: HOSPITALIST

## 2019-08-29 PROCEDURE — 85025 COMPLETE CBC W/AUTO DIFF WBC: CPT | Performed by: NURSE PRACTITIONER

## 2019-08-29 PROCEDURE — 80048 BASIC METABOLIC PNL TOTAL CA: CPT | Performed by: HOSPITALIST

## 2019-08-29 RX ORDER — CEPHALEXIN 500 MG/1
500 CAPSULE ORAL EVERY 6 HOURS SCHEDULED
Qty: 32 CAPSULE | Refills: 0 | Status: SHIPPED | OUTPATIENT
Start: 2019-08-29 | End: 2019-09-06

## 2019-08-29 RX ADMIN — OXYCODONE HYDROCHLORIDE 20 MG: 10 TABLET ORAL at 07:16

## 2019-08-29 RX ADMIN — CEPHALEXIN 500 MG: 500 CAPSULE ORAL at 05:29

## 2019-08-29 RX ADMIN — PANTOPRAZOLE SODIUM 40 MG: 40 TABLET, DELAYED RELEASE ORAL at 05:29

## 2019-08-29 RX ADMIN — VANCOMYCIN HYDROCHLORIDE 125 MG: 5 INJECTION, POWDER, LYOPHILIZED, FOR SOLUTION INTRAVENOUS at 09:36

## 2019-08-29 NOTE — ASSESSMENT & PLAN NOTE
Due to E   Coli UTI  Will send home on 8 more days of po Keflex to complete a 14 day antibiotic course  I would like PCP to check a CBC in a week to make sure that the WBC is normalizing

## 2019-08-29 NOTE — ASSESSMENT & PLAN NOTE
Patient refused to have this surgically removed by urology   He understands the ongoing risk of infection  He will go home with a dangelo catheter and follow up with urology in the office

## 2019-08-29 NOTE — PLAN OF CARE
Problem: Potential for Falls  Goal: Patient will remain free of falls  Description  INTERVENTIONS:  - Assess patient frequently for physical needs  -  Identify cognitive and physical deficits and behaviors that affect risk of falls  -  [place] fall precautions as indicated by assessment   - Educate patient/family on patient safety including physical limitations  - Instruct patient to call for assistance with activity based on assessment  - Modify environment to reduce risk of injury  - Consider OT/PT consult to assist with strengthening/mobility  Outcome: Adequate for Discharge     Problem: Prexisting or High Potential for Compromised Skin Integrity  Goal: Skin integrity is maintained or improved  Description  INTERVENTIONS:  - Identify patients at risk for skin breakdown  - Assess and monitor skin integrity  - Assess and monitor nutrition and hydration status  - Monitor labs   - Assess for incontinence   - Turn and reposition patient  - Assist with mobility/ambulation  - Relieve pressure over bony prominences  - Avoid friction and shearing  - Provide appropriate hygiene as needed including keeping skin clean and dry  - Evaluate need for skin moisturizer/barrier cream  - Collaborate with interdisciplinary team   - Patient/family teaching  - Consider wound care consult   Outcome: Adequate for Discharge     Problem: Nutrition/Hydration-ADULT  Goal: Nutrient/Hydration intake appropriate for improving, restoring or maintaining nutritional needs  Description  Monitor and assess patient's nutrition/hydration status for malnutrition  Collaborate with interdisciplinary team and initiate plan and interventions as ordered  Monitor patient's weight and dietary intake as ordered or per policy  Utilize nutrition screening tool and intervene as necessary  Determine patient's food preferences and provide high-protein, high-caloric foods as appropriate       INTERVENTIONS:  - Monitor oral intake, urinary output, labs, and treatment plans  - Assess nutrition and hydration status and recommend course of action  - Evaluate amount of meals eaten  - Assist patient with eating if necessary   - Allow adequate time for meals  - Recommend/ encourage appropriate diets, oral nutritional supplements, and vitamin/mineral supplements  - Order, calculate, and assess calorie counts as needed  - Recommend, monitor, and adjust tube feedings and TPN/PPN based on assessed needs  - Assess need for intravenous fluids  - Provide specific nutrition/hydration education as appropriate  - Include patient/family/caregiver in decisions related to nutrition  Outcome: Adequate for Discharge     Problem: PAIN - ADULT  Goal: Verbalizes/displays adequate comfort level or baseline comfort level  Description  Interventions:  - Encourage patient to monitor pain and request assistance  - Assess pain using appropriate pain scale  - Administer analgesics based on type and severity of pain and evaluate response  - Implement non-pharmacological measures as appropriate and evaluate response  - Consider cultural and social influences on pain and pain management  - Notify physician/advanced practitioner if interventions unsuccessful or patient reports new pain  Outcome: Adequate for Discharge     Problem: INFECTION - ADULT  Goal: Absence or prevention of progression during hospitalization  Description  INTERVENTIONS:  - Assess and monitor for signs and symptoms of infection  - Monitor lab/diagnostic results  - Monitor all insertion sites, i e  indwelling lines, tubes, and drains  - [place] appropriate cooling/warming therapies per order  - Administer medications as ordered  - Instruct and encourage patient and family to use good hand hygiene technique  - Identify and instruct in appropriate isolation precautions for identified infection/condition   Outcome: Adequate for Discharge     Problem: SAFETY ADULT  Goal: Maintain or return to baseline ADL function  Description  INTERVENTIONS:  -  Assess patient's ability to carry out ADLs; assess patient's baseline for ADL function and identify physical deficits which impact ability to perform ADLs (bathing, care of mouth/teeth, toileting, grooming, dressing, etc )  - Assess/evaluate cause of self-care deficits   - Assess range of motion  - Assess patient's mobility; develop plan if impaired  - Assess patient's need for assistive devices and provide as appropriate  - Encourage maximum independence but intervene and supervise when necessary  - Involve family in performance of ADLs  - Assess for home care needs following discharge   - Consider OT consult to assist with ADL evaluation and planning for discharge  - Provide patient education as appropriate  Outcome: Adequate for Discharge  Goal: Maintain or return mobility status to optimal level  Description  INTERVENTIONS:  - Assess patient's baseline mobility status (ambulation, transfers, stairs, etc )    - Identify cognitive and physical deficits and behaviors that affect mobility  - Identify mobility aids required to assist with transfers and/or ambulation (gait belt, sit-to-stand, lift, walker, cane, etc )  - [place] fall precautions as indicated by assessment  - Record patient progress and toleration of activity level on Mobility SBAR; progress patient to next Phase/Stage  - Instruct patient to call for assistance with activity based on assessment  - Consider rehabilitation consult to assist with strengthening/weightbearing, etc   Outcome: Adequate for Discharge     Problem: DISCHARGE PLANNING  Goal: Discharge to home or other facility with appropriate resources  Description  INTERVENTIONS:  - Identify barriers to discharge w/patient and caregiver  - Arrange for needed discharge resources and transportation as appropriate  - Identify discharge learning needs (meds, wound care, etc )  - Refer to Case Management Department for coordinating discharge planning if the patient needs post-hospital services based on physician/advanced practitioner order or complex needs related to functional status, cognitive ability, or social support system   Outcome: Adequate for Discharge     Problem: Knowledge Deficit  Goal: Patient/family/caregiver demonstrates understanding of disease process, treatment plan, medications, and discharge instructions  Description  Complete learning assessment and assess knowledge base    Interventions:  - Provide teaching at level of understanding  - Provide teaching via preferred learning methods  Outcome: Adequate for Discharge     Problem: CARDIOVASCULAR - ADULT  Goal: Maintains optimal cardiac output and hemodynamic stability  Description  INTERVENTIONS:  - Monitor I/O, vital signs and rhythm  - Monitor for S/S and trends of decreased cardiac output  - Administer and titrate ordered vasoactive medications to optimize hemodynamic stability  - Assess quality of pulses, skin color and temperature  - Assess for signs of decreased coronary artery perfusion  - Instruct patient to report change in severity of symptoms  Outcome: Adequate for Discharge  Goal: Absence of cardiac dysrhythmias or at baseline rhythm  Description  INTERVENTIONS:  - Continuous cardiac monitoring, vital signs, obtain 12 lead EKG if ordered  - Administer antiarrhythmic and heart rate control medications as ordered  - Monitor electrolytes and administer replacement therapy as ordered  Outcome: Adequate for Discharge     Problem: GASTROINTESTINAL - ADULT  Goal: Maintains adequate nutritional intake  Description  INTERVENTIONS:  - Monitor percentage of each meal consumed  - Identify factors contributing to decreased intake, treat as appropriate  - Assist with meals as needed  - Monitor I&O, weight, and lab values if indicated  - Obtain nutrition services referral as needed  Outcome: Adequate for Discharge  Goal: Establish and maintain optimal ostomy function  Description  INTERVENTIONS:  - Assess bowel function  - Encourage oral fluids to ensure adequate hydration  - Administer IV fluids if ordered to ensure adequate hydration   - Administer ordered medications as needed  - Encourage mobilization and activity  - Nutrition services referral to assist patient with appropriate food choices  - Assess stoma site  - Consider wound care consult   Outcome: Adequate for Discharge     Problem: GENITOURINARY - ADULT  Goal: Maintains or returns to baseline urinary function  Description  INTERVENTIONS:  - Assess urinary function  - Encourage oral fluids to ensure adequate hydration if ordered  - Administer IV fluids as ordered to ensure adequate hydration  - Administer ordered medications as needed     Outcome: Adequate for Discharge  Goal: Absence of urinary retention  Description  INTERVENTIONS:  - Assess patient's ability to void and empty bladder  - Monitor I/O  - Bladder scan as needed  - Discuss with physician/AP medications to alleviate retention as needed  - Discuss catheterization for long term situations as appropriate   Outcome: Adequate for Discharge  Goal: Urinary catheter remains patent  Description  INTERVENTIONS:  - Assess patency of urinary catheter  - Follow guidelines for intermittent irrigation of non-functioning urinary catheter   Outcome: Adequate for Discharge     Problem: METABOLIC, FLUID AND ELECTROLYTES - ADULT  Goal: Electrolytes maintained within normal limits  Description  INTERVENTIONS:  - Monitor labs and assess patient for signs and symptoms of electrolyte imbalances  - Administer electrolyte replacement as ordered  - Monitor response to electrolyte replacements, including repeat lab results as appropriate  - Instruct patient on fluid and nutrition as appropriate  Outcome: Adequate for Discharge  Goal: Fluid balance maintained  Description  INTERVENTIONS:  - Monitor labs   - Monitor I/O and WT  - Instruct patient on fluid and nutrition as appropriate  - Assess for signs & symptoms of volume excess or deficit  Outcome: Adequate for Discharge     Problem: SKIN/TISSUE INTEGRITY - ADULT  Goal: Skin integrity remains intact  Description  INTERVENTIONS  - Identify patients at risk for skin breakdown  - Assess and monitor skin integrity  - Assess and monitor nutrition and hydration status  - Monitor labs (i e  albumin)  - Assess for incontinence   - Turn and reposition patient  - Assist with mobility/ambulation  - Relieve pressure over bony prominences  - Avoid friction and shearing  - Provide appropriate hygiene as needed including keeping skin clean and dry  - Evaluate need for skin moisturizer/barrier cream  - Collaborate with interdisciplinary team (i e  Nutrition, Rehabilitation, etc )   - Patient/family teaching  Outcome: Adequate for Discharge  Goal: Incision(s), wounds(s) or drain site(s) healing without S/S of infection  Description  INTERVENTIONS  - Assess and document risk factors for skin impairment   - Assess and document dressing, incision, wound bed, drain sites and surrounding tissue  - Consider nutrition services referral as needed  - Oral mucous membranes remain intact  - Provide patient/ family education  Outcome: Adequate for Discharge  Goal: Oral mucous membranes remain intact  Description  INTERVENTIONS  - Assess oral mucosa and hygiene practices  - Implement preventative oral hygiene regimen  - Implement oral medicated treatments as ordered  - Initiate Nutrition services referral as needed  Outcome: Adequate for Discharge     Problem: HEMATOLOGIC - ADULT  Goal: Maintains hematologic stability  Description  INTERVENTIONS  - Assess for signs and symptoms of bleeding or hemorrhage  - Monitor labs  - Administer supportive blood products/factors as ordered and appropriate  Outcome: Adequate for Discharge     Problem: MUSCULOSKELETAL - ADULT  Goal: Maintain or return mobility to safest level of function  Description  INTERVENTIONS:  - Assess patient's ability to carry out ADLs; assess patient's baseline for ADL function and identify physical deficits which impact ability to perform ADLs (bathing, care of mouth/teeth, toileting, grooming, dressing, etc )  - Assess/evaluate cause of self-care deficits   - Assess range of motion  - Assess patient's mobility  - Assess patient's need for assistive devices and provide as appropriate  - Encourage maximum independence but intervene and supervise when necessary  - Involve family in performance of ADLs  - Assess for home care needs following discharge   - Consider OT consult to assist with ADL evaluation and planning for discharge  - Provide patient education as appropriate  Outcome: Adequate for Discharge  Goal: Maintain proper alignment of affected body part  Description  INTERVENTIONS:  - Support, maintain and protect limb and body alignment  - Provide patient/ family with appropriate education  Outcome: Adequate for Discharge

## 2019-08-29 NOTE — TELEPHONE ENCOUNTER
Seen in consultation for difficult Solomon through normally does clean intermittent catheterization 3 times daily at home  Solomon placed in the ER on 8/23/2019  Please schedule for Solomon removal in the office late next week  We should also schedule him with an appointment with Dr Deirdre Browning to discuss removal of his penile implant, given his significant sacral decubitus ulcer      Sherry Ellsworth PA-C  Date: 8/29/2019 Time: 9:20 AM

## 2019-08-29 NOTE — TELEPHONE ENCOUNTER
Left detailed message on VM,  appt made for Thursday, 9/5/19 @ 8:30 with nurse to remove dangelo catheter  Asked him to call back and confirm appt    He also needs an appt with Dr Rigoberto Puentes to discuss implant removal

## 2019-08-29 NOTE — DISCHARGE SUMMARY
Discharge- Prateek Flint Moscat 1960, 63 y o  male MRN: [id number]    Unit/Bed#: E4 -01 Encounter: [id number]    Primary Care Provider: Sherley Esparza MD   Date and time admitted to hospital: 8/23/2019 11:55 AM        * Sepsis with hypotension [facility])  Assessment & Plan  Due to E  Coli UTI  Will send home on 8 more days of po Keflex to complete a 14 day antibiotic course  I would like PCP to check a CBC in a week to make sure that the WBC is normalizing      Complicated urinary tract infection  Assessment & Plan  Change to oral abx    History of Clostridium difficile colitis  Assessment & Plan  On prophylactic vancomycin    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, subsequent encounter  Assessment & Plan  Patient refused to have this surgically removed by urology   He understands the ongoing risk of infection  He will go home with a dangelo catheter and follow up with urology in the office      Discharging Physician / Practitioner: Flaca Arroyo DO  PCP: Sherley Esparza MD  Admission Date:   Admission Orders (From admission, onward)     Ordered        08/23/19 1612  Inpatient Admission (expected length of stay for this patient Order details is greater than two midnights)  Once                   Discharge Date: 08/29/19    Resolved Problems  Date Reviewed: 8/29/2019    None            Consultations During Hospital Stay:  · Urology  · ID      ·       Reason for Admission: difficulty [facility] Course: Margoth Blancas is a 63 y o  male patient who originally presented to the hospital on 8/23/2019 due to difficulty urinating  He was found to have sepsis with hypotension due to an E  Coli UTI  He was seen by urology and ID  He steadily improved with antbiotics  Urology recommended removal of his remaining penile prosthesis as is unonfunctional and will be an ongoing area that is at risk for infection  However, the patient adimantly refuses     He will go home with a chronic dangelo catheter and 8 more days of oral Keflex to complete a 14 day antibiotic course  He will follow up with urology in the office  His WBC is mildly elevated on the day of discharge  However, he feels well and is afebrile  I will send him home, but I would like his PCP to check a CBC in a week to make sure that the WBC has normalized  Please see above list of diagnoses and related plan for additional information  Condition at Discharge: good       Discharge Day Visit / Exam:     Subjective:  Feels well  No fever  No abdominal pain  He is anxious to go home  Vitals: Blood Pressure: 124/74 (08/29/19 0824)  Pulse: (!) 109 (08/29/19 0824)  Temperature: 99 °F (37 2 °C) (08/29/19 0824)  Temp Source: Tympanic (08/29/19 0824)  Respirations: 18 (08/29/19 0824)  Height: 5' 4" (162 6 cm) (08/23/19 2305)  Weight - Scale: 83 9 kg (184 lb 15 5 oz) (08/23/19 1152)  SpO2: 98 % (08/29/19 0824)    Exam:     Physical Exam   HENT:   Head: Normocephalic and atraumatic  Eyes: Pupils are equal, round, and reactive to light  EOM are normal    Cardiovascular: Normal rate and regular rhythm  Exam reveals no gallop and no friction rub  No murmur heard  Pulmonary/Chest: Effort normal and breath sounds normal  He has no wheezes  He has no rales  Abdominal: Soft  Bowel sounds are normal  There is no tenderness  Musculoskeletal: He exhibits no edema  Nursing note and vitals reviewed            Discharge instructions/Information to patient and family:   See after visit summary for information provided to patient and family  Provisions for Follow-Up Care:  See after visit summary for information related to follow-up care and any pertinent home health orders  Disposition:     Home       Discharge Statement:  I spent 41 minutes discharging the patient  This time was spent on the day of discharge  I had direct contact with the patient on the day of discharge   Greater than 50% of the total time was spent examining patient, answering all patient questions, arranging and discussing plan of care with patient as well as directly providing post-discharge instructions  Additional time then spent on discharge activities  Discharge Medications:  See after visit summary for reconciled discharge medications provided to patient and family        ** Please Note: This note has been constructed using a voice recognition system **

## 2019-08-29 NOTE — PROGRESS NOTES
Progress Note - Infectious Disease   Allen Isaac Moscat 63 y o  male MRN: [id number]  Unit/Bed#: E4 -01 Encounter: [id number]      Impression/Recommendations:  1  Sepsis, POA, likely secondary to UTI   Patient is clinically much improved   He remains hemodynamically stable   Admission blood cultures are negative so far  Antibiotic plan as in below  Follow-up on blood cultures        2  Complicated E coli UTI   Patient is clinically improved   He will need a more prolonged course of antibiotic, given presence of Solomon catheter  Continue p o  Keflex  Treat x 14 days total, another 7 days      3  Abnormal pelvic CT, showing air in perineal soft tissue   Clinically, patient does not have perineal cellulitis or abscess  4  Leukocytosis  Patient has no fever  He is clinically well  There is concern regarding infected penile prosthesis  Patient has refused removal of prosthesis  If WBC remains elevated, patient may need reimaging of pelvis as an outpatient and prosthesis may need to be removed  This can be followed as an outpatient      Discussed with patient in detail regarding the above plan  Discussed with Dr Yanni Giles from [facility] for discharge from ID viewpoint  Patient to follow-up with PCP after discharge  Antibiotics:  Keflex  Antibiotic # 7    Subjective:  Patient feels well  No abdominal pain  No urinary symptoms  Temperature stays down  No chills  He is tolerating antibiotic well  No nausea, vomiting or diarrhea  Objective:  Vitals:  Temp:  [98 4 °F (36 9 °C)-99 °F (37 2 °C)] 99 °F (37 2 °C)  HR:  [] 109  Resp:  [18] 18  BP: (102-124)/(60-76) 124/74  SpO2:  [96 %-99 %] 98 %  Temp (24hrs), Av 6 °F (37 °C), Min:98 4 °F (36 9 °C), Max:99 °F (37 2 °C)  Current: Temperature: 99 °F (37 2 °C)    Physical Exam:     General: Awake, alert, cooperative, no distress  Neck:  Supple  No mass  No lymphadenopathy     Lungs: Expansion symmetric, no rales, no wheezing, respirations unlabored  Heart:  Regular rate and rhythm, S1 and S2 normal, no murmur  Abdomen: Soft, nondistended, non-tender, bowel sounds active all four quadrants,        no masses, no organomegaly  Extremities: No edema  No erythema/warmth  No ulcer  Nontender to palpation  Skin:  No rash  Invasive Devices     Drain            Colostomy Descending/sigmoid LLQ -- days    Urethral Catheter Coude 5 days                Labs studies:   I have personally reviewed pertinent labs  Results from last 7 days   Lab Units 08/29/19  0526 08/28/19  0506 08/27/19  0558  08/23/19  1211   POTASSIUM mmol/L 4 1 4 1 3 2*   < > 3 1*   CHLORIDE mmol/L 103 104 105   < > 94*   CO2 mmol/L 27 26 26   < > 24   BUN mg/dL 16 19 11   < > 8   CREATININE mg/dL 0 65 0 56* 0 56*   < > 0 70   EGFR ml/min/1 73sq m 107 114 114   < > 104   CALCIUM mg/dL 9 8 9 6 9 2   < > 8 6   AST U/L  --   --   --   --  32   ALT U/L  --   --   --   --  20   ALK PHOS U/L  --   --   --   --  166*    < > = values in this interval not displayed  Results from last 7 days   Lab Units 08/29/19  0526 08/28/19  0506 08/27/19  0558   WBC Thousand/uL 14 85* 13 49* 9 19   HEMOGLOBIN g/dL 12 0 9 9* 10 0*   PLATELETS Thousands/uL 756* 590* 479*     Results from last 7 days   Lab Units 08/23/19  1450 08/23/19  1226 08/23/19  1211   BLOOD CULTURE   --  No Growth After 5 Days  No Growth After 5 Days  URINE CULTURE  30,000-39,000 cfu/ml Escherichia coli*  --   --        Imaging Studies:   I have personally reviewed pertinent imaging study reports and images in PACS  EKG, Pathology, and Other Studies:   I have personally reviewed pertinent reports

## 2019-08-30 ENCOUNTER — TRANSITIONAL CARE MANAGEMENT (OUTPATIENT)
Dept: FAMILY MEDICINE CLINIC | Facility: CLINIC | Age: 59
End: 2019-08-30

## 2019-09-03 DIAGNOSIS — G82.20 PARAPLEGIC SPINAL PARALYSIS (HCC): ICD-10-CM

## 2019-09-04 ENCOUNTER — APPOINTMENT (OUTPATIENT)
Dept: WOUND CARE | Facility: HOSPITAL | Age: 59
End: 2019-09-04
Payer: MEDICARE

## 2019-09-04 PROCEDURE — 11042 DBRDMT SUBQ TIS 1ST 20SQCM/<: CPT

## 2019-09-04 RX ORDER — OXYCODONE HYDROCHLORIDE 20 MG/1
20 TABLET ORAL EVERY 8 HOURS PRN
Qty: 90 TABLET | Refills: 0 | Status: ON HOLD | OUTPATIENT
Start: 2019-09-04 | End: 2019-09-25 | Stop reason: SDUPTHER

## 2019-09-05 ENCOUNTER — TELEPHONE (OUTPATIENT)
Dept: FAMILY MEDICINE CLINIC | Facility: CLINIC | Age: 59
End: 2019-09-05

## 2019-09-05 ENCOUNTER — CLINICAL SUPPORT (OUTPATIENT)
Dept: UROLOGY | Facility: MEDICAL CENTER | Age: 59
End: 2019-09-05
Payer: MEDICARE

## 2019-09-05 VITALS — TEMPERATURE: 97.9 F | HEART RATE: 85 BPM | BODY MASS INDEX: 31.58 KG/M2 | WEIGHT: 185 LBS | HEIGHT: 64 IN

## 2019-09-05 DIAGNOSIS — R33.9 RETENTION OF URINE: Primary | ICD-10-CM

## 2019-09-05 PROCEDURE — 99211 OFF/OP EST MAY X REQ PHY/QHP: CPT

## 2019-09-05 NOTE — PROGRESS NOTES
Pt in office for catheter removal  Catheter removed without difficulty  Pt knows resume self-catheterizations  He would like to discuss removal penile implant with Dr Spencer Alston, so appt given for that in 3 weeks in office

## 2019-09-06 NOTE — TELEPHONE ENCOUNTER
[address] calling after leaving messages since the beginning of the week  They need some sort documentation faxed to them with the patient's diagnosis or reason as to why he is taking Oxycodone   Fax number

## 2019-09-06 NOTE — TELEPHONE ENCOUNTER
Please be aware that what is actually required is a prior auth  As per new insurance guidelines narcotic modifications are requiring prior auth, not just a note and or treatment plans, this process takes a few days to complete   Nadia can you please start prior auth for this patient

## 2019-09-06 NOTE — TELEPHONE ENCOUNTER
This is the third patient from the same [facility]  Prior [facility] is not needed  New policy with [employer]  Sent them a letter with the information they need and nothing more

## 2019-09-09 NOTE — TELEPHONE ENCOUNTER
Patient called and said pharmacy did not receive anything  I called the pharmacy and they also said they did not receive anything  Would you please be able to refax?

## 2019-09-13 ENCOUNTER — OFFICE VISIT (OUTPATIENT)
Dept: FAMILY MEDICINE CLINIC | Facility: CLINIC | Age: 59
End: 2019-09-13

## 2019-09-13 VITALS
TEMPERATURE: 98.7 F | HEART RATE: 106 BPM | SYSTOLIC BLOOD PRESSURE: 100 MMHG | DIASTOLIC BLOOD PRESSURE: 70 MMHG | RESPIRATION RATE: 18 BRPM

## 2019-09-13 DIAGNOSIS — I95.9 SEPSIS WITH HYPOTENSION (HCC): Primary | ICD-10-CM

## 2019-09-13 DIAGNOSIS — A41.9 SEPSIS WITH HYPOTENSION (HCC): Primary | ICD-10-CM

## 2019-09-13 DIAGNOSIS — N39.0 URINARY TRACT INFECTION ASSOCIATED WITH CATHETERIZATION OF URINARY TRACT, UNSPECIFIED INDWELLING URINARY CATHETER TYPE, INITIAL ENCOUNTER (HCC): ICD-10-CM

## 2019-09-13 DIAGNOSIS — T83.511A URINARY TRACT INFECTION ASSOCIATED WITH CATHETERIZATION OF URINARY TRACT, UNSPECIFIED INDWELLING URINARY CATHETER TYPE, INITIAL ENCOUNTER (HCC): ICD-10-CM

## 2019-09-13 LAB
SL AMB  POCT GLUCOSE, UA: ABNORMAL
SL AMB LEUKOCYTE ESTERASE,UA: ABNORMAL
SL AMB POCT BILIRUBIN,UA: ABNORMAL
SL AMB POCT BLOOD,UA: ABNORMAL
SL AMB POCT CLARITY,UA: ABNORMAL
SL AMB POCT COLOR,UA: ABNORMAL
SL AMB POCT KETONES,UA: ABNORMAL
SL AMB POCT NITRITE,UA: ABNORMAL
SL AMB POCT PH,UA: 6
SL AMB POCT SPECIFIC GRAVITY,UA: 1.01
SL AMB POCT URINE PROTEIN: 30
SL AMB POCT UROBILINOGEN: NEGATIVE

## 2019-09-13 PROCEDURE — 87181 SC STD AGAR DILUTION PER AGT: CPT | Performed by: FAMILY MEDICINE

## 2019-09-13 PROCEDURE — 87077 CULTURE AEROBIC IDENTIFY: CPT | Performed by: FAMILY MEDICINE

## 2019-09-13 PROCEDURE — 87086 URINE CULTURE/COLONY COUNT: CPT | Performed by: FAMILY MEDICINE

## 2019-09-13 PROCEDURE — 81002 URINALYSIS NONAUTO W/O SCOPE: CPT | Performed by: FAMILY MEDICINE

## 2019-09-13 PROCEDURE — 87186 SC STD MICRODIL/AGAR DIL: CPT | Performed by: FAMILY MEDICINE

## 2019-09-13 PROCEDURE — 99495 TRANSJ CARE MGMT MOD F2F 14D: CPT | Performed by: FAMILY MEDICINE

## 2019-09-13 NOTE — PROGRESS NOTES
Assessment/Plan:     Sepsis with hypotension ([facility] )  I discussed with patient at length I am concerned he may still have an infection given the results of his urine and the fact he feels tired and is tachycardic   Patient states he would like to await results of blood work prior to proceeding with any treatment  Discussed with patient if he develops fever, chills, increased fatigue, nausea should go straight to ED          Problem List Items Addressed This Visit        Other    Sepsis with hypotension ([facility] ) - Primary     I discussed with patient at length I am concerned he may still have an infection given the results of his urine and the fact he feels tired and is tachycardic   Patient states he would like to await results of blood work prior to proceeding with any treatment  Discussed with patient if he develops fever, chills, increased fatigue, nausea should go straight to ED          Relevant Orders    CBC and differential      Other Visit Diagnoses     Urinary tract infection associated with catheterization of urinary tract, unspecified indwelling urinary catheter type, initial encounter ([facility] )        Relevant Orders    POCT urine dip (Completed)    Urine culture    CBC and differential           Subjective:     Patient ID: Tyler Brumfield is a 63 y o  male  62 yo  male patient admitted to [place] [address] on 8/23/2019 due to difficulty urinating,  found to have sepsis with hypotension due to an E  Coli UTI  He was seen by urology and ID  He improved with IV antbiotics  He was seen and evaluated by ID and Urology while inpatient  Urology recommended removal of his remaining penile prosthesis since it is unonfunctional and is at risk for infection  The patient refused  He was discharged home with a chronic dangelo catheter and 8 days of oral Keflex to complete a 14 day antibiotic course  He is here today for a transition of care   Patient states he finished his course of antibiotic, Keflex, and was doing well for 3 to 4 days  Started feeling increasingly tired and with decreased appetite  Denies nausea, vomiting, fever, chills  Reports night sweats last night  Brings with him a urine specimen from earlier this am         [facility] [address]        Review of Systems   Constitutional: Positive for appetite change, diaphoresis and fatigue  Negative for fever  All other systems reviewed and are negative  Objective:     Physical Exam   Constitutional: He is oriented to person, place, and time  He appears well-developed  HENT:   Head: Normocephalic  Right Ear: External ear normal    Left Ear: External ear normal    Nose: Nose normal    Mouth/Throat: Oropharynx is clear and moist    Eyes: Pupils are equal, round, and reactive to light  Conjunctivae and EOM are normal    Neck: Normal range of motion  Neck supple  No thyromegaly present  Cardiovascular: Normal rate, regular rhythm and normal heart sounds  Pulmonary/Chest: Effort normal and breath sounds normal    Abdominal: Soft  There is no tenderness  There is no rebound and no guarding  Musculoskeletal: Normal range of motion  Neurological: He is alert and oriented to person, place, and time  He has normal reflexes  Skin: Skin is dry  Psychiatric: He has a normal mood and affect  Nursing note and vitals reviewed  Vitals:    09/13/19 1029   BP: 100/70   BP Location: Left arm   Patient Position: Sitting   Cuff Size: Adult   Pulse: (!) 106   Resp: 18   Temp: 98 7 °F (37 1 °C)   TempSrc: Temporal       Transitional Care Management Review:  Krista Espinosa is a 63 y o  male here for TCM follow up       During the TCM phone call patient stated:    TCM Call (since 8/13/2019)     Date and time call was made  8/30/2019 11:29 AM    Hospital care reviewed  Records reviewed    Patient was hospitialized at  [facility]     Date of Admission  08/23/19    Date of discharge  08/29/19    Diagnosis  sepsis with hypotension    Disposition  Home Were the patients medications reviewed and updated  Yes    Current Symptoms  None      TCM Call (since 8/13/2019)     Post hospital issues  None    Scheduled for follow up?   Yes    Did you obtain your prescribed medications  Yes    I have advised the patient to call PCP with any new or worsening symptoms  SHELL GAY    Living Arrangements  Family members    Counseling  Patient    Comments  D/C on 8/29/2019 from [facility] for Sepsis with Hypotension          Mariah Opitz, MD

## 2019-09-13 NOTE — ASSESSMENT & PLAN NOTE
I discussed with patient at length I am concerned he may still have an infection given the results of his urine and the fact he feels tired and is tachycardic   Patient states he would like to await results of blood work prior to proceeding with any treatment  Discussed with patient if he develops fever, chills, increased fatigue, nausea should go straight to ED

## 2019-09-14 ENCOUNTER — APPOINTMENT (EMERGENCY)
Dept: RADIOLOGY | Facility: HOSPITAL | Age: 59
DRG: 871 | End: 2019-09-14
Payer: MEDICARE

## 2019-09-14 ENCOUNTER — HOSPITAL ENCOUNTER (INPATIENT)
Facility: HOSPITAL | Age: 59
LOS: 2 days | DRG: 871 | End: 2019-09-16
Attending: EMERGENCY MEDICINE | Admitting: FAMILY MEDICINE
Payer: MEDICARE

## 2019-09-14 ENCOUNTER — APPOINTMENT (EMERGENCY)
Dept: CT IMAGING | Facility: HOSPITAL | Age: 59
DRG: 871 | End: 2019-09-14
Payer: MEDICARE

## 2019-09-14 DIAGNOSIS — A41.9 SEPSIS (HCC): Primary | ICD-10-CM

## 2019-09-14 DIAGNOSIS — T83.61XD INFECTION AND INFLAMMATORY REACTION DUE TO IMPLANTED PENILE PROSTHESIS, SUBSEQUENT ENCOUNTER: ICD-10-CM

## 2019-09-14 DIAGNOSIS — L89.323: Chronic | ICD-10-CM

## 2019-09-14 DIAGNOSIS — N31.9 NEUROGENIC BLADDER: ICD-10-CM

## 2019-09-14 DIAGNOSIS — N49.3: ICD-10-CM

## 2019-09-14 DIAGNOSIS — N39.0 COMPLICATED URINARY TRACT INFECTION: ICD-10-CM

## 2019-09-14 DIAGNOSIS — L89.154 STAGE IV PRESSURE ULCER OF SACRAL REGION (HCC): Chronic | ICD-10-CM

## 2019-09-14 DIAGNOSIS — N39.0 UTI (URINARY TRACT INFECTION): ICD-10-CM

## 2019-09-14 PROBLEM — E87.5 HYPERKALEMIA: Status: ACTIVE | Noted: 2019-09-14

## 2019-09-14 LAB
ALBUMIN SERPL BCP-MCNC: 3.3 G/DL (ref 3–5.2)
ALP SERPL-CCNC: 95 U/L (ref 43–122)
ALT SERPL W P-5'-P-CCNC: 8 U/L (ref 9–52)
ANION GAP SERPL CALCULATED.3IONS-SCNC: 8 MMOL/L (ref 5–14)
ANISOCYTOSIS BLD QL SMEAR: PRESENT
APTT PPP: 27 SECONDS (ref 25–32)
AST SERPL W P-5'-P-CCNC: 19 U/L (ref 17–59)
BACTERIA UR QL AUTO: ABNORMAL /HPF
BASOPHILS # BLD AUTO: 0.1 THOUSANDS/ΜL (ref 0–0.1)
BASOPHILS NFR BLD AUTO: 1 % (ref 0–1)
BILIRUB SERPL-MCNC: 0.4 MG/DL
BILIRUB UR QL STRIP: NEGATIVE
BUN SERPL-MCNC: 18 MG/DL (ref 5–25)
CALCIUM SERPL-MCNC: 8.4 MG/DL (ref 8.4–10.2)
CHLORIDE SERPL-SCNC: 98 MMOL/L (ref 97–108)
CLARITY UR: ABNORMAL
CO2 SERPL-SCNC: 27 MMOL/L (ref 22–30)
COLOR UR: YELLOW
CREAT SERPL-MCNC: 0.5 MG/DL (ref 0.7–1.5)
EOSINOPHIL # BLD AUTO: 0.1 THOUSAND/ΜL (ref 0–0.4)
EOSINOPHIL NFR BLD AUTO: 1 % (ref 0–6)
ERYTHROCYTE [DISTWIDTH] IN BLOOD BY AUTOMATED COUNT: 17 %
GFR SERPL CREATININE-BSD FRML MDRD: 119 ML/MIN/1.73SQ M
GLUCOSE SERPL-MCNC: 105 MG/DL (ref 70–99)
GLUCOSE UR STRIP-MCNC: NEGATIVE MG/DL
HCT VFR BLD AUTO: 27 % (ref 41–53)
HGB BLD-MCNC: 8.7 G/DL (ref 13.5–17.5)
HGB UR QL STRIP.AUTO: 250
HYPERCHROMIA BLD QL SMEAR: PRESENT
INR PPP: 1.15 (ref 0.91–1.09)
KETONES UR STRIP-MCNC: NEGATIVE MG/DL
LACTATE SERPL-SCNC: 0.8 MMOL/L (ref 0.7–2)
LEUKOCYTE ESTERASE UR QL STRIP: 500
LG PLATELETS BLD QL SMEAR: PRESENT
LYMPHOCYTES # BLD AUTO: 1.1 THOUSANDS/ΜL (ref 0.5–4)
LYMPHOCYTES NFR BLD AUTO: 8 % (ref 25–45)
MCH RBC QN AUTO: 23.8 PG (ref 26–34)
MCHC RBC AUTO-ENTMCNC: 32 G/DL (ref 31–36)
MCV RBC AUTO: 74 FL (ref 80–100)
MONOCYTES # BLD AUTO: 1.1 THOUSAND/ΜL (ref 0.2–0.9)
MONOCYTES NFR BLD AUTO: 8 % (ref 1–10)
NEUTROPHILS # BLD AUTO: 11.5 THOUSANDS/ΜL (ref 1.8–7.8)
NEUTS SEG NFR BLD AUTO: 83 % (ref 45–65)
NITRITE UR QL STRIP: NEGATIVE
NON-SQ EPI CELLS URNS QL MICRO: ABNORMAL /HPF
PH UR STRIP.AUTO: 6 [PH]
PLATELET # BLD AUTO: 436 THOUSANDS/UL (ref 150–450)
PLATELET BLD QL SMEAR: ABNORMAL
PMV BLD AUTO: 7.9 FL (ref 8.9–12.7)
POTASSIUM SERPL-SCNC: 3.3 MMOL/L (ref 3.6–5)
PROT SERPL-MCNC: 8.2 G/DL (ref 5.9–8.4)
PROT UR STRIP-MCNC: ABNORMAL MG/DL
PROTHROMBIN TIME: 12.6 SECONDS (ref 9.8–12)
RBC # BLD AUTO: 3.65 MILLION/UL (ref 4.5–5.9)
RBC #/AREA URNS AUTO: ABNORMAL /HPF
RBC MORPH BLD: ABNORMAL
SODIUM SERPL-SCNC: 133 MMOL/L (ref 137–147)
SP GR UR STRIP.AUTO: 1.01 (ref 1–1.04)
UROBILINOGEN UA: NEGATIVE MG/DL
WBC # BLD AUTO: 13.9 THOUSAND/UL (ref 4.5–11)
WBC #/AREA URNS AUTO: ABNORMAL /HPF

## 2019-09-14 PROCEDURE — 36556 INSERT NON-TUNNEL CV CATH: CPT | Performed by: NURSE PRACTITIONER

## 2019-09-14 PROCEDURE — 96365 THER/PROPH/DIAG IV INF INIT: CPT

## 2019-09-14 PROCEDURE — 87186 SC STD MICRODIL/AGAR DIL: CPT | Performed by: PHYSICIAN ASSISTANT

## 2019-09-14 PROCEDURE — 36415 COLL VENOUS BLD VENIPUNCTURE: CPT | Performed by: PHYSICIAN ASSISTANT

## 2019-09-14 PROCEDURE — 85025 COMPLETE CBC W/AUTO DIFF WBC: CPT | Performed by: PHYSICIAN ASSISTANT

## 2019-09-14 PROCEDURE — 83605 ASSAY OF LACTIC ACID: CPT | Performed by: PHYSICIAN ASSISTANT

## 2019-09-14 PROCEDURE — 02HV33Z INSERTION OF INFUSION DEVICE INTO SUPERIOR VENA CAVA, PERCUTANEOUS APPROACH: ICD-10-PCS | Performed by: EMERGENCY MEDICINE

## 2019-09-14 PROCEDURE — 74176 CT ABD & PELVIS W/O CONTRAST: CPT

## 2019-09-14 PROCEDURE — 96374 THER/PROPH/DIAG INJ IV PUSH: CPT

## 2019-09-14 PROCEDURE — 83540 ASSAY OF IRON: CPT | Performed by: NURSE PRACTITIONER

## 2019-09-14 PROCEDURE — 93005 ELECTROCARDIOGRAM TRACING: CPT

## 2019-09-14 PROCEDURE — 83550 IRON BINDING TEST: CPT | Performed by: NURSE PRACTITIONER

## 2019-09-14 PROCEDURE — 87147 CULTURE TYPE IMMUNOLOGIC: CPT | Performed by: PHYSICIAN ASSISTANT

## 2019-09-14 PROCEDURE — 96361 HYDRATE IV INFUSION ADD-ON: CPT

## 2019-09-14 PROCEDURE — 87086 URINE CULTURE/COLONY COUNT: CPT | Performed by: PHYSICIAN ASSISTANT

## 2019-09-14 PROCEDURE — 85610 PROTHROMBIN TIME: CPT | Performed by: PHYSICIAN ASSISTANT

## 2019-09-14 PROCEDURE — 81001 URINALYSIS AUTO W/SCOPE: CPT | Performed by: PHYSICIAN ASSISTANT

## 2019-09-14 PROCEDURE — 99284 EMERGENCY DEPT VISIT MOD MDM: CPT | Performed by: PHYSICIAN ASSISTANT

## 2019-09-14 PROCEDURE — 81003 URINALYSIS AUTO W/O SCOPE: CPT | Performed by: PHYSICIAN ASSISTANT

## 2019-09-14 PROCEDURE — 99291 CRITICAL CARE FIRST HOUR: CPT

## 2019-09-14 PROCEDURE — 87077 CULTURE AEROBIC IDENTIFY: CPT | Performed by: PHYSICIAN ASSISTANT

## 2019-09-14 PROCEDURE — 80053 COMPREHEN METABOLIC PANEL: CPT | Performed by: PHYSICIAN ASSISTANT

## 2019-09-14 PROCEDURE — 82728 ASSAY OF FERRITIN: CPT | Performed by: NURSE PRACTITIONER

## 2019-09-14 PROCEDURE — 71045 X-RAY EXAM CHEST 1 VIEW: CPT

## 2019-09-14 PROCEDURE — 84145 PROCALCITONIN (PCT): CPT | Performed by: PHYSICIAN ASSISTANT

## 2019-09-14 PROCEDURE — 87040 BLOOD CULTURE FOR BACTERIA: CPT | Performed by: PHYSICIAN ASSISTANT

## 2019-09-14 PROCEDURE — 85730 THROMBOPLASTIN TIME PARTIAL: CPT | Performed by: PHYSICIAN ASSISTANT

## 2019-09-14 RX ORDER — FENTANYL CITRATE 50 UG/ML
50 INJECTION, SOLUTION INTRAMUSCULAR; INTRAVENOUS ONCE
Status: DISCONTINUED | OUTPATIENT
Start: 2019-09-14 | End: 2019-09-14

## 2019-09-14 RX ORDER — IBUPROFEN 400 MG/1
800 TABLET ORAL ONCE
Status: COMPLETED | OUTPATIENT
Start: 2019-09-14 | End: 2019-09-14

## 2019-09-14 RX ORDER — OXYCODONE HYDROCHLORIDE 5 MG/1
20 TABLET ORAL EVERY 8 HOURS PRN
Status: DISCONTINUED | OUTPATIENT
Start: 2019-09-14 | End: 2019-09-16 | Stop reason: HOSPADM

## 2019-09-14 RX ORDER — NUT.TX.IMPAIRED DIGESTIVE FXN 0.035-1/ML
1 LIQUID (ML) ORAL 3 TIMES DAILY
Status: DISCONTINUED | OUTPATIENT
Start: 2019-09-15 | End: 2019-09-15

## 2019-09-14 RX ORDER — PANTOPRAZOLE SODIUM 40 MG/1
40 TABLET, DELAYED RELEASE ORAL
Status: DISCONTINUED | OUTPATIENT
Start: 2019-09-15 | End: 2019-09-15

## 2019-09-14 RX ORDER — ASPIRIN 81 MG/1
81 TABLET ORAL DAILY
Status: DISCONTINUED | OUTPATIENT
Start: 2019-09-15 | End: 2019-09-16 | Stop reason: HOSPADM

## 2019-09-14 RX ORDER — POTASSIUM CHLORIDE 20 MEQ/1
40 TABLET, EXTENDED RELEASE ORAL ONCE
Status: COMPLETED | OUTPATIENT
Start: 2019-09-15 | End: 2019-09-15

## 2019-09-14 RX ORDER — CEFEPIME HYDROCHLORIDE 2 G/50ML
2000 INJECTION, SOLUTION INTRAVENOUS EVERY 12 HOURS
Status: DISCONTINUED | OUTPATIENT
Start: 2019-09-15 | End: 2019-09-15

## 2019-09-14 RX ORDER — SODIUM CHLORIDE 9 MG/ML
1000 INJECTION, SOLUTION INTRAVENOUS CONTINUOUS
Status: DISCONTINUED | OUTPATIENT
Start: 2019-09-14 | End: 2019-09-15

## 2019-09-14 RX ORDER — CEFEPIME HYDROCHLORIDE 2 G/50ML
2000 INJECTION, SOLUTION INTRAVENOUS ONCE
Status: COMPLETED | OUTPATIENT
Start: 2019-09-14 | End: 2019-09-14

## 2019-09-14 RX ORDER — VANCOMYCIN HYDROCHLORIDE 1 G/200ML
15 INJECTION, SOLUTION INTRAVENOUS ONCE
Status: COMPLETED | OUTPATIENT
Start: 2019-09-14 | End: 2019-09-15

## 2019-09-14 RX ORDER — SODIUM CHLORIDE, SODIUM LACTATE, POTASSIUM CHLORIDE, CALCIUM CHLORIDE 600; 310; 30; 20 MG/100ML; MG/100ML; MG/100ML; MG/100ML
75 INJECTION, SOLUTION INTRAVENOUS CONTINUOUS
Status: DISCONTINUED | OUTPATIENT
Start: 2019-09-15 | End: 2019-09-15

## 2019-09-14 RX ORDER — FENTANYL CITRATE 50 UG/ML
25 INJECTION, SOLUTION INTRAMUSCULAR; INTRAVENOUS ONCE
Status: COMPLETED | OUTPATIENT
Start: 2019-09-14 | End: 2019-09-14

## 2019-09-14 RX ADMIN — CEFEPIME HYDROCHLORIDE 2000 MG: 2 INJECTION, SOLUTION INTRAVENOUS at 22:06

## 2019-09-14 RX ADMIN — VANCOMYCIN HYDROCHLORIDE 1000 MG: 1 INJECTION, SOLUTION INTRAVENOUS at 23:12

## 2019-09-14 RX ADMIN — FENTANYL CITRATE 25 MCG: 50 INJECTION INTRAMUSCULAR; INTRAVENOUS at 21:39

## 2019-09-14 RX ADMIN — SODIUM CHLORIDE 1000 ML/HR: 0.9 INJECTION, SOLUTION INTRAVENOUS at 18:51

## 2019-09-14 RX ADMIN — IBUPROFEN 800 MG: 400 TABLET ORAL at 18:41

## 2019-09-14 NOTE — ED NOTES
Attempt times for IV unsuccessful     Carmelita Thomson RN  09/14/19 401 [facility], RN  09/14/19 8089

## 2019-09-15 LAB
ANION GAP SERPL CALCULATED.3IONS-SCNC: 6 MMOL/L (ref 5–14)
BUN SERPL-MCNC: 13 MG/DL (ref 5–25)
CALCIUM SERPL-MCNC: 8.5 MG/DL (ref 8.4–10.2)
CHLORIDE SERPL-SCNC: 101 MMOL/L (ref 97–108)
CO2 SERPL-SCNC: 28 MMOL/L (ref 22–30)
CREAT SERPL-MCNC: 0.42 MG/DL (ref 0.7–1.5)
ERYTHROCYTE [DISTWIDTH] IN BLOOD BY AUTOMATED COUNT: 16.7 %
FERRITIN SERPL-MCNC: 278 NG/ML (ref 8–388)
FOLATE SERPL-MCNC: 3.4 NG/ML (ref 3.1–17.5)
GFR SERPL CREATININE-BSD FRML MDRD: 128 ML/MIN/1.73SQ M
GLUCOSE SERPL-MCNC: 157 MG/DL (ref 65–140)
GLUCOSE SERPL-MCNC: 95 MG/DL (ref 65–140)
GLUCOSE SERPL-MCNC: 96 MG/DL (ref 70–99)
GLUCOSE SERPL-MCNC: 97 MG/DL (ref 65–140)
HCT VFR BLD AUTO: 25.8 % (ref 41–53)
HEMOCCULT STL QL: NEGATIVE
HEMOCCULT STL QL: NEGATIVE
HGB BLD-MCNC: 8.3 G/DL (ref 13.5–17.5)
IRON SATN MFR SERPL: 8 %
IRON SERPL-MCNC: 15 UG/DL (ref 65–175)
MCH RBC QN AUTO: 24.4 PG (ref 26–34)
MCHC RBC AUTO-ENTMCNC: 32.1 G/DL (ref 31–36)
MCV RBC AUTO: 76 FL (ref 80–100)
PLATELET # BLD AUTO: 400 THOUSANDS/UL (ref 150–450)
PMV BLD AUTO: 7.8 FL (ref 8.9–12.7)
POTASSIUM SERPL-SCNC: 3.6 MMOL/L (ref 3.6–5)
PROCALCITONIN SERPL-MCNC: 0.72 NG/ML
PROCALCITONIN SERPL-MCNC: 0.76 NG/ML
RBC # BLD AUTO: 3.4 MILLION/UL (ref 4.5–5.9)
SODIUM SERPL-SCNC: 135 MMOL/L (ref 137–147)
TIBC SERPL-MCNC: 186 UG/DL (ref 250–450)
VIT B12 SERPL-MCNC: 527 PG/ML (ref 100–900)
WBC # BLD AUTO: 10.3 THOUSAND/UL (ref 4.5–11)

## 2019-09-15 PROCEDURE — 82948 REAGENT STRIP/BLOOD GLUCOSE: CPT

## 2019-09-15 PROCEDURE — 99223 1ST HOSP IP/OBS HIGH 75: CPT | Performed by: NURSE PRACTITIONER

## 2019-09-15 PROCEDURE — NC001 PR NO CHARGE: Performed by: FAMILY MEDICINE

## 2019-09-15 PROCEDURE — 82607 VITAMIN B-12: CPT | Performed by: FAMILY MEDICINE

## 2019-09-15 PROCEDURE — 85027 COMPLETE CBC AUTOMATED: CPT | Performed by: NURSE PRACTITIONER

## 2019-09-15 PROCEDURE — 80048 BASIC METABOLIC PNL TOTAL CA: CPT | Performed by: NURSE PRACTITIONER

## 2019-09-15 PROCEDURE — 82272 OCCULT BLD FECES 1-3 TESTS: CPT | Performed by: NURSE PRACTITIONER

## 2019-09-15 PROCEDURE — 82272 OCCULT BLD FECES 1-3 TESTS: CPT | Performed by: FAMILY MEDICINE

## 2019-09-15 PROCEDURE — 82746 ASSAY OF FOLIC ACID SERUM: CPT | Performed by: FAMILY MEDICINE

## 2019-09-15 PROCEDURE — 84145 PROCALCITONIN (PCT): CPT | Performed by: NURSE PRACTITIONER

## 2019-09-15 RX ORDER — LANOLIN ALCOHOL/MO/W.PET/CERES
400 CREAM (GRAM) TOPICAL DAILY
Status: DISCONTINUED | OUTPATIENT
Start: 2019-09-15 | End: 2019-09-15

## 2019-09-15 RX ORDER — SODIUM CHLORIDE 9 MG/ML
75 INJECTION, SOLUTION INTRAVENOUS CONTINUOUS
Status: DISCONTINUED | OUTPATIENT
Start: 2019-09-15 | End: 2019-09-15

## 2019-09-15 RX ORDER — FERROUS SULFATE 325(65) MG
325 TABLET ORAL
Status: DISCONTINUED | OUTPATIENT
Start: 2019-09-15 | End: 2019-09-16 | Stop reason: HOSPADM

## 2019-09-15 RX ORDER — CEFEPIME HYDROCHLORIDE 1 G/50ML
1000 INJECTION, SOLUTION INTRAVENOUS EVERY 12 HOURS
Status: DISCONTINUED | OUTPATIENT
Start: 2019-09-15 | End: 2019-09-15

## 2019-09-15 RX ORDER — FOLIC ACID 1 MG/1
500 TABLET ORAL DAILY
Status: DISCONTINUED | OUTPATIENT
Start: 2019-09-16 | End: 2019-09-16 | Stop reason: HOSPADM

## 2019-09-15 RX ORDER — SACCHAROMYCES BOULARDII 250 MG
250 CAPSULE ORAL 2 TIMES DAILY
Status: DISCONTINUED | OUTPATIENT
Start: 2019-09-15 | End: 2019-09-16 | Stop reason: HOSPADM

## 2019-09-15 RX ORDER — CEFEPIME HYDROCHLORIDE 2 G/50ML
2000 INJECTION, SOLUTION INTRAVENOUS EVERY 12 HOURS
Status: DISCONTINUED | OUTPATIENT
Start: 2019-09-15 | End: 2019-09-16

## 2019-09-15 RX ORDER — ASCORBIC ACID 500 MG
500 TABLET ORAL
Status: DISCONTINUED | OUTPATIENT
Start: 2019-09-15 | End: 2019-09-16 | Stop reason: HOSPADM

## 2019-09-15 RX ADMIN — ENOXAPARIN SODIUM 40 MG: 40 INJECTION SUBCUTANEOUS at 09:29

## 2019-09-15 RX ADMIN — Medication 250 MG: at 12:30

## 2019-09-15 RX ADMIN — SODIUM CHLORIDE, SODIUM LACTATE, POTASSIUM CHLORIDE, AND CALCIUM CHLORIDE 125 ML/HR: .6; .31; .03; .02 INJECTION, SOLUTION INTRAVENOUS at 01:06

## 2019-09-15 RX ADMIN — CEFEPIME HYDROCHLORIDE 2000 MG: 2 INJECTION, SOLUTION INTRAVENOUS at 20:00

## 2019-09-15 RX ADMIN — POTASSIUM CHLORIDE 40 MEQ: 20 TABLET, EXTENDED RELEASE ORAL at 01:00

## 2019-09-15 RX ADMIN — OXYCODONE HYDROCHLORIDE AND ACETAMINOPHEN 500 MG: 500 TABLET ORAL at 09:32

## 2019-09-15 RX ADMIN — SODIUM CHLORIDE 75 ML/HR: 0.9 INJECTION, SOLUTION INTRAVENOUS at 09:26

## 2019-09-15 RX ADMIN — OXYCODONE HYDROCHLORIDE 20 MG: 5 TABLET ORAL at 09:30

## 2019-09-15 RX ADMIN — Medication 250 MG: at 17:29

## 2019-09-15 RX ADMIN — OXYCODONE HYDROCHLORIDE 20 MG: 5 TABLET ORAL at 17:30

## 2019-09-15 RX ADMIN — ASPIRIN 81 MG: 81 TABLET ORAL at 09:30

## 2019-09-15 RX ADMIN — FERROUS SULFATE TAB 325 MG (65 MG ELEMENTAL FE) 325 MG: 325 (65 FE) TAB at 09:32

## 2019-09-15 NOTE — PROCEDURES
Central Line Insertion  Date/Time: 9/14/2019 9:46 PM  Performed by: KALPANA Godinez  Authorized by: KALPANA Godinez     Patient location:  Bedside  Consent:     Consent obtained:  Written    Consent given by:  Patient    Risks discussed:  Arterial puncture, bleeding, incorrect placement, infection, nerve damage and pneumothorax    Alternatives discussed:  Delayed treatment, no treatment, alternative treatment and observation  Universal protocol:     Immediately prior to procedure, a time out was called: yes      Patient identity confirmed:  Verbally with patient  Pre-procedure details:     Hand hygiene: Hand hygiene performed prior to insertion      Sterile barrier technique: All elements of maximal sterile technique followed      Skin preparation:  2% chlorhexidine    Skin preparation agent: Skin preparation agent completely dried prior to procedure    Indications:     Central line indications: medications requiring central line and no peripheral vascular access    Anesthesia (see MAR for exact dosages): Anesthesia method:  Local infiltration    Local anesthetic:  Lidocaine 1% w/o epi  Procedure details:     Location:  Right internal jugular    Vessel type: vein      Laterality:  Right    Approach: percutaneous technique used      Patient position:  Trendelenburg    Catheter type:  Triple lumen 16cm    Catheter size:  7 5 Fr    Landmarks identified: yes      Ultrasound guidance: yes      Sterile ultrasound techniques: Sterile gel and sterile probe covers were used      Number of attempts:  1    Successful placement: yes      Vessel of catheter tip end:  Right SVC  Post-procedure details:     Post-procedure:  Dressing applied and line sutured    Assessment:  Blood return through all ports and free fluid flow    Patient tolerance of procedure:   Tolerated well, no immediate complications

## 2019-09-15 NOTE — ASSESSMENT & PLAN NOTE
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

## 2019-09-15 NOTE — PROGRESS NOTES
Acceptance/Transfer Note - [facility] [address]    Assessment and plan:    Patient Active Problem List   Diagnosis    Sepsis ([facility] )    Complicated urinary tract infection    Stage IV pressure ulcer of sacral region ([facility] )    Stage IV pressure ulcer of left heel (HCC)    Cyst of joint of shoulder    Anemia    Paraplegic spinal paralysis ([facility] )    Sinus tachycardia    GERD (gastroesophageal reflux disease)    History of osteomyelitis    Anxiety    Amputated right leg (HCC)    Benign essential hypertension    Diabetes mellitus type II, controlled ([facility] )    Diarrhea    Decubitus ulcer of ischium, left, stage IV (HCC)    Opioid use    Colostomy in place [facility])    Colon cancer screening    Dyspepsia    Epigastric pain    Osteomyelitis of pelvic region ([facility] )    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[facility])    Neurogenic bladder    Sepsis with hypotension ([facility] )    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, subsequent encounter    History of Clostridium difficile colitis    Hyperkalemia     Sepsis has resolved, afebrile and vital signs have normalized, decreased cefepime to 1 g b i d  and will consider discontinuing antibiotics given that his UA is not suggestive of urinary origin  No renal involvement noted on the most recent laboratories and has been tolerating p o  intake very well, will discontinue IV hydration  Patient was also on vancomycin p o  for C diff prophylaxis, will discontinue vancomycin p o   Diabetes mellitus has been diet controlled and with the fact of no noted hyperglycemic episodes on laboratory or fingersticks, will discontinue the insulin sliding scale, will continue with diabetic diet  Patient has also noted that he would like to see his regular urologist and will decline any procedure recommended by urology if seen in the hospital, therefore will cancel the Urology consult    Patient is less likely to be hypertensive as his blood pressure ranges from about 100-120/60-80 which has been consistent during his office visits as well  Net urine output of -580 cc of urine since admission, averaging about 1 cc/kilogram per hour  Hemoglobin has been stable since admission, previous iron panel about 2 years ago showed a significantly depressed iron levels  Patient's hemoglobin levels fluctuates tremendously but tend to normalize between 8 5-9 5  Asymptomatic and no reported signs of active bleeding and negative Hemoccult therefore most likely secondary to iron deficiency versus anemia of chronic disease  Will order vitamin B12 and folate levels  Will place the patient on iron supplements with vitamin-C and will consider the addition of a multivitamin tablet  Sacral decubitus ulcer stable, wound care consult in place  Physical Exam:  Blood Pressure: 99/62 (09/15/19 0733)  Pulse: 89 (09/15/19 0733)  Temperature: 98 1 °F (36 7 °C) (09/15/19 0733)  Temp Source: Temporal (09/15/19 0733)  Respirations: 16 (09/15/19 0733)  Weight - Scale: 68 5 kg (151 lb) (09/14/19 1816)  SpO2: 100 % (09/15/19 0733)      Physical Exam   Constitutional: He is oriented to person, place, and time  He appears well-developed and well-nourished  No distress  HENT:   Head: Normocephalic and atraumatic  Nose: Nose normal    A right eye corneal clouding suggestive of cataract   Eyes: Pupils are equal, round, and reactive to light  Conjunctivae are normal    Neck: Normal range of motion  Neck supple  Cardiovascular: Normal rate, regular rhythm and normal heart sounds  Exam reveals no gallop and no friction rub  No murmur heard  Pulmonary/Chest: Effort normal and breath sounds normal  No respiratory distress  He has no wheezes  He has no rales  Abdominal: Soft  Bowel sounds are normal  There is no tenderness     Obese abdomen with noted multiple abdominal scars  Left lower quadrant ostomy bag, intact with no surrounding erythema or leakage   Genitourinary:   Genitourinary Comments: No penile discharge noted at  Indwelling dangelo catheter noted with no noted erythema  Slightly swollen scrotum but no tenderness warmth or erythema, no foul odor  Noted urine in the Dangelo catheter bag the did not seem cloudy   Musculoskeletal: Normal range of motion  1+ pitting edema on the left lower extremity  A right BKA with intact stump   Neurological: He is alert and oriented to person, place, and time  Tetraplegia, left upper extremity full range of motion and grossly intact strength   Skin: Skin is warm and dry  He is not diaphoretic  No erythema  A large sacral ulcer with noted tunneling, no noted purulent discharge or surrounding erythema       Transfer Summary:  Sheree England is a 63 y o  male with recurrent urinary tract infections, chronic Dangelo catheter, penile implant, and who was also paraplegic who was admitted for sepsis  The patient was seen in the clinic prior to his admission for hospitalization follow-up  At that time, the patient reported feeling fatigued and tired and difficulty passing his Dangelo catheter  In the office, he was found to be hypotensive and tachycardic but afebrile  On admission, the patient was tachycardic, febrile with a T-max of 102 4 and was found to have leukocytosis  At the emergency department and upon admission, the patient was given 1 dose Vanco, was started on cefepime 2 g twice daily and vancomycin p o  for C diff prophylaxis  He denied any diarrhea or GI symptoms aside from abdominal pain and distension  Patient reports significant improvement since admission  He denied any feeling of warmth, chills, shortness of breath, chest pain, nausea, abdominal pain, feeling of penile burning or discomfort  He did note that his scrotum is not swollen        Alexx Marmolejo MD  09/15/19  7:42 AM

## 2019-09-15 NOTE — SEPSIS NOTE
Sepsis Note   Ruben Castro Moscat 63 y o  male MRN: [id number]  Unit/Bed#: ED 03 Encounter: [id number]      qSOFA     Row Name 09/14/19 1944 09/14/19 1816             Altered mental status GCS < 15  0         Respiratory Rate > / =22    0       Systolic BP < / =097    0       Q Sofa Score  0  0           Initial Sepsis Screening     Row Name 09/14/19 2142                Is the patient's history suggestive of a new or worsening infection? (!) Yes (Proceed)  -AW        Suspected source of infection  implant or prosthesis infection;urinary tract infection  -AW        Are two or more of the following signs & symptoms of infection both present and new to the patient? (!) Yes (Proceed)  -AW        Indicate SIRS criteria  Hyperthemia > 38 3C (100 9F); Tachycardia > 90 bpm  -AW        If the answer is yes to both questions, suspicion of sepsis is present          If severe sepsis is present AND tissue hypoperfusion perists in the hour after fluid resuscitation or lactate > 4, the patient meets criteria for SEPTIC SHOCK          Are any of the following organ dysfunction criteria present within 6 hours of suspected infection and SIRS criteria that are NOT considered to be chronic conditions? (!) Yes  -AW        Organ dysfunction          Date of presentation of severe sepsis  09/14/19  -AW        Time of presentation of severe sepsis  1900  -AW        Tissue hypoperfusion persists in the hour after crystalloid fluid administration, evidenced, by either:          Was hypotension present within one hour of the conclusion of crystalloid fluid administration?         Date of presentation of septic shock  09/14/19  -AW        Time of presentation of septic shock  1900  -AW          User Key  (r) = Recorded By, (t) = Taken By, (c) = Cosigned By    [address] Name Provider Type    AW Joel Rai PA-C Physician Assistant            Labs and intervention were delayed as patient refused IJ Central access   We attempted peripheral access and were unsuccessful  Patient was finally agreeable to IJ central access  BC x2 obtained from central line  Awaiting labs  Attending visualized patient and was aware of scrotal swelling and ulcerations  Patient reports no pain and this is chronic

## 2019-09-15 NOTE — NURSING NOTE
Patient medicated for pain on his sacrum  Turns self from side to side  Alert and oriented x4  Lungs clear bilaterally but diminished  Belly soft with + BS  Colostomy intact  Solomon draining but may require additional flushes  Will monitor  Vitals stable  Call light within reach

## 2019-09-15 NOTE — PLAN OF CARE
Problem: Prexisting or High Potential for Compromised Skin Integrity  Goal: Skin integrity is maintained or improved  Description  INTERVENTIONS:  - Identify patients at risk for skin breakdown  - Assess and monitor skin integrity  - Assess and monitor nutrition and hydration status  - Monitor labs   - Assess for incontinence   - Turn and reposition patient  - Assist with mobility/ambulation  - Relieve pressure over bony prominences  - Avoid friction and shearing  - Provide appropriate hygiene as needed including keeping skin clean and dry  - Evaluate need for skin moisturizer/barrier cream  - Collaborate with interdisciplinary team   - Patient/family teaching  - Consider wound care consult   Outcome: Progressing     Problem: Nutrition/Hydration-ADULT  Goal: Nutrient/Hydration intake appropriate for improving, restoring or maintaining nutritional needs  Description  Monitor and assess patient's nutrition/hydration status for malnutrition  Collaborate with interdisciplinary team and initiate plan and interventions as ordered  Monitor patient's weight and dietary intake as ordered or per policy  Utilize nutrition screening tool and intervene as necessary  Determine patient's food preferences and provide high-protein, high-caloric foods as appropriate       INTERVENTIONS:  - Monitor oral intake, urinary output, labs, and treatment plans  - Assess nutrition and hydration status and recommend course of action  - Evaluate amount of meals eaten  - Assist patient with eating if necessary   - Allow adequate time for meals  - Recommend/ encourage appropriate diets, oral nutritional supplements, and vitamin/mineral supplements  - Order, calculate, and assess calorie counts as needed  - Recommend, monitor, and adjust tube feedings and TPN/PPN based on assessed needs  - Assess need for intravenous fluids  - Provide specific nutrition/hydration education as appropriate  - Include patient/family/caregiver in decisions related to nutrition  Outcome: Progressing     Problem: PAIN - ADULT  Goal: Verbalizes/displays adequate comfort level or baseline comfort level  Description  Interventions:  - Encourage patient to monitor pain and request assistance  - Assess pain using appropriate pain scale  - Administer analgesics based on type and severity of pain and evaluate response  - Implement non-pharmacological measures as appropriate and evaluate response  - Consider cultural and social influences on pain and pain management  - Notify physician/advanced practitioner if interventions unsuccessful or patient reports new pain  Outcome: Progressing     Problem: INFECTION - ADULT  Goal: Absence or prevention of progression during hospitalization  Description  INTERVENTIONS:  - Assess and monitor for signs and symptoms of infection  - Monitor lab/diagnostic results  - Monitor all insertion sites, i e  indwelling lines, tubes, and drains  - Monitor endotracheal if appropriate and nasal secretions for changes in amount and color  - [place] appropriate cooling/warming therapies per order  - Administer medications as ordered  - Instruct and encourage patient and family to use good hand hygiene technique  - Identify and instruct in appropriate isolation precautions for identified infection/condition  Outcome: Progressing  Goal: Absence of fever/infection during neutropenic period  Description  INTERVENTIONS:  - Monitor WBC    Outcome: Progressing     Problem: SAFETY ADULT  Goal: Patient will remain free of falls  Description  INTERVENTIONS:  - Assess patient frequently for physical needs  -  Identify cognitive and physical deficits and behaviors that affect risk of falls    -  [place] fall precautions as indicated by assessment   - Educate patient/family on patient safety including physical limitations  - Instruct patient to call for assistance with activity based on assessment  - Modify environment to reduce risk of injury  - Consider OT/PT consult to assist with strengthening/mobility  Outcome: Progressing  Goal: Maintain or return to baseline ADL function  Description  INTERVENTIONS:  -  Assess patient's ability to carry out ADLs; assess patient's baseline for ADL function and identify physical deficits which impact ability to perform ADLs (bathing, care of mouth/teeth, toileting, grooming, dressing, etc )  - Assess/evaluate cause of self-care deficits   - Assess range of motion  - Assess patient's mobility; develop plan if impaired  - Assess patient's need for assistive devices and provide as appropriate  - Encourage maximum independence but intervene and supervise when necessary  - Involve family in performance of ADLs  - Assess for home care needs following discharge   - Consider OT consult to assist with ADL evaluation and planning for discharge  - Provide patient education as appropriate  Outcome: Progressing  Goal: Maintain or return mobility status to optimal level  Description  INTERVENTIONS:  - Assess patient's baseline mobility status (ambulation, transfers, stairs, etc )    - Identify cognitive and physical deficits and behaviors that affect mobility  - Identify mobility aids required to assist with transfers and/or ambulation (gait belt, sit-to-stand, lift, walker, cane, etc )  - Spring fall precautions as indicated by assessment  - Record patient progress and toleration of activity level on Mobility SBAR; progress patient to next Phase/Stage  - Instruct patient to call for assistance with activity based on assessment  - Consider rehabilitation consult to assist with strengthening/weightbearing, etc   Outcome: Progressing     Problem: DISCHARGE PLANNING  Goal: Discharge to home or other facility with appropriate resources  Description  INTERVENTIONS:  - Identify barriers to discharge w/patient and caregiver  - Arrange for needed discharge resources and transportation as appropriate  - Identify discharge learning needs (meds, wound care, etc )  - Arrange for interpretive services to assist at discharge as needed  - Refer to Case Management Department for coordinating discharge planning if the patient needs post-hospital services based on physician/advanced practitioner order or complex needs related to functional status, cognitive ability, or social support system  Outcome: Progressing     Problem: Knowledge Deficit  Goal: Patient/family/caregiver demonstrates understanding of disease process, treatment plan, medications, and discharge instructions  Description  Complete learning assessment and assess knowledge base    Interventions:  - Provide teaching at level of understanding  - Provide teaching via preferred learning methods  Outcome: Progressing

## 2019-09-15 NOTE — NURSING NOTE
Pt received to room 505 from ed via stretcher  Pt transferred to bed by staff due to pt being paraplegic and having right AKA  Pt is aox3 pleasant and cooperative  C/o pain 8/10 on bottom  Pt has stg IV wound to buttocks  Dressing changed and wound charted  Pt receives wound care at home  Pt refusing to take insulin, states he does not take it at home and does not want it here, NP aware  Pt has right upper extremity weakness, can lift up but no  strength  +2 radial pulse  Left lower extremity paralysis +1 pedal pulse, right lower extremity amputated at high hip, +2 femoral pulse  Pt has colostomy bag, no redness or irritation  Pt has dangelo, draining cloudy yellow urine  Pt oriented to room, call bell within reach, will continue to monitor

## 2019-09-15 NOTE — ASSESSMENT & PLAN NOTE
Lab Results   Component Value Date    HGBA1C 5 2 05/06/2019       No results for input(s): POCGLU in the last 72 hours      Blood Sugar Average: Last 72 hrs:     Carb controlled diet  Accuchecks QAC and HS with SSI coverage

## 2019-09-15 NOTE — NURSING NOTE
Flushed dangelo with 20 mls of sterile saline and returned yellow/yareli urine which was initially slow to start but quickly released and towards the end white mucous threads drained  Patient tolerated well   No pain r/t no sensation below the waist

## 2019-09-15 NOTE — ASSESSMENT & PLAN NOTE
POA  Patient reports history of since 2015  With chronic osteomyelitis  Wound nurse consult  Local wound care

## 2019-09-15 NOTE — H&P
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  He was evaluated by general surgery and urology at that time  Patient was recommended to have penile implant removed urgently but patient refused  He plans to have it removed by his Urologist, Dr Ricky Johnson he returns from vacation in about 3 weeks  Patient given cefepime and vancomycin in the ED  Will continue cefepime and discuss need for additional vancomycin   Will re-consult urology for recommendations   Blood cultures, urine cultures sent        Complicated urinary tract infection  Assessment & Plan  Assessment/Plan as above  Urine culture pending    Hyperkalemia  Assessment & Plan  Will replete with Kdur 40 meq  Continue to monitor and replenish as necessary    History of Clostridium difficile colitis  Assessment & Plan  Will place on vancomycin prophylactically  Monitor for diarrhea      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 [address] )  Assessment & Plan  Lab Results   Component Value Date    HGBA1C 5 2 05/06/2019       No results for input(s): POCGLU in the last 72 hours      Blood Sugar Average: Last 72 hrs:     Carb controlled diet  Accuchecks QAC and HS with SSI coverage    Benign essential hypertension  Assessment & Plan  Normotensive  Does not appear to be on meds as an outpatient    Paraplegic spinal paralysis [facility])  Assessment & Plan  Secondary to spinal cysts     Anemia  Assessment & Plan  Microcytic anemia with 3 gm drop since 8/29  Will hemoccult stool  Check iron panel  Transfuse for hemoglobin <7    Stage IV pressure ulcer of sacral region [facility])  Assessment & Plan  POA  Patient reports history of since 2015  With chronic osteomyelitis  Wound nurse consult  Local wound care      VTE Prophylaxis: Enoxaparin (Lovenox)  / sequential compression device   Code Status: Full code  POLST: POLST is not applicable to this patient  Discussion with family: patient reports, it is not necessary, they already know    Anticipated Length of Stay:  Patient will be admitted on an Inpatient basis with an anticipated length of stay of  Greater than 2 midnights  Justification for Hospital Stay: work up, monitor and treat sepsis    Total Time for Visit, including Counseling / Coordination of Care: 45 minutes  Greater than 50% of this total time spent on direct patient counseling and coordination of care  Chief Complaint:   Unable to pass dangelo catheter for urine drainage    History of Present Illness:    Zachery Ramey is a 63 y o  male who presents to the ED due to the inability to pass a dangelo catheter through his penis as he usually does 3x a day  Patient reports the last time he was hospitalized in August, it was because he was unable to pass the catheter due to urosepsis, +UTI (+ecoli)  He completed a 14 day course of antibiotics, it completed on 9/6  He went to his PCP on 9/13 because he states he was not feeling well  A urine culture was obtained at that time  On patient's last admission, his penile implant was infected and suggested to be surgically removed  Patient refused during that hospitalization and states that he will see his urologist, Dr Vear Favre, in about 3 weeks when he returns from vacation   He plans to have it removed by him  An abd/pelvic CT scan on this admission reveals a small amount of fluid with the left perirectal/mesorectal space, likely residual to prior infection, without significant evidence of a deep pelvic and perineal soft tissue infection as seen on prior exam  Patient will be admitted for sepsis with broad spectrum antibiotics and evaluated by urology with the hope of expediting his urologic consultation with Dr Tere Couch  Review of Systems:    Review of Systems   Constitutional: Positive for fever  HENT: Negative  Eyes: Negative  Respiratory: Negative  Cardiovascular: Positive for leg swelling  Gastrointestinal: Positive for abdominal distention and abdominal pain  Endocrine: Negative  Genitourinary: Positive for difficulty urinating, penile swelling and scrotal swelling  Unable to pass dangelo catheter   Musculoskeletal: Positive for back pain  Paraplegia   Skin: Positive for wound  Large sacral decub with tunneling   Allergic/Immunologic: Negative  Neurological: Negative  Hematological: Negative  Psychiatric/Behavioral: Negative          Past Medical and Surgical History:     Past Medical History:   Diagnosis Date    Anemia     Blind     r eye    Chronic cystitis     Colostomy in place [facility])     Detrusor sphincter dyssynergia     Diabetes mellitus ([facility] [address] )     Poorly controlled type 2; Last Assessed:  3/18/14    Erectile dysfunction     Frequency of urination     GERD (gastroesophageal reflux disease)     History of diabetes mellitus     History of osteomyelitis     Hx of leg amputation (HCC)     r high upper leg    Hyperlipidemia     Hypertension     Hypospadias     Incomplete bladder emptying     Neurogenic bladder     Paralysis (HCC)     Paraplegia (HCC)     Spinal cord cysts     Ulcer of sacral region ([address] )     Urge incontinence        Past Surgical History:   Procedure Laterality Date    AMPUTATION      At hip; Last Assessed: 1/19/16    BLADDER SURGERY      COLON SURGERY      llq ostomy pouch    COLOSTOMY      COMPLEX CYSTOMETROGRAM  2014    CYSTOSCOPY  2014    LEG AMPUTATION      MEATOTOMY      PENILE PROSTHESIS IMPLANT  2011    OR ADJ TISS XFER SCALP,EXTREM 10 1-30 SQCM Left 5/1/2017    Procedure: POSTERIOR THIGH V-Y ADMANCEMENT;  Surgeon: Minoo Marcelino MD;  [facility];  Service: Plastics    OR MUSCLE-SKIN FLAP,TRUNK Left 5/1/2017    Procedure: FLAP CLOSURE LEFT ISCHIAL WOUND and "RIGHT" ISCHIAL FLAP ADVANCEMENT * DEBRIDEMENT, VAC PLACEMENT ;  Surgeon: Minoo Marcelino MD;  [facility];  Service: Plastics    OR MUSCLE-SKIN FLAP,TRUNK Left 9/27/2017    Procedure: gluteal myocutaneous rotational flap, posterior thigh v to y advancement- wound 5 x 2 5 x 8;  Surgeon: Minoo Marcelino MD;  [facility];  Service: Plastics    SPINE SURGERY      Lower back    UROFLOWMETRY SIMPLE / COMPLEX  2014       Meds/Allergies:    Prior to Admission medications    Medication Sig Start Date End Date Taking?  Authorizing Provider   ACCU-CHEK FASTCLIX LANCETS MISC by Other route 2 (two) times a day Test as directed 1/18/19  Yes Zarina Seo MD   ACCU-CHEK SMARTVIEW test strip TEST TWICE DAILY 7/25/19  Yes Zarina Seo MD   ASPIRIN LOW DOSE 81 MG EC tablet TAKE 1 TABLET BY MOUTH EVERY DAY 7/29/19  Yes Zarina Seo MD   Disposable Gloves (LATEX GLOVES LARGE) MISC Use as needed up to 3 times a day dispo 2 boxes 5/22/19  Yes Zarina Seo MD   ibuprofen (MOTRIN) 800 mg tablet TAKE 1 TABLET(800 MG) BY MOUTH DAILY AS NEEDED FOR MODERATE PAIN 7/29/19  Yes Zarina Seo MD   Incontinence Supply Disposable (SUNBEAM INCONTINENT UNDER PAD) MISC Use 1 per week, dispense 4 reusable underpads 5/31/19  Yes Zarina Seo MD   Incontinence Supply Disposable MISC by Does not apply route 2 (two) times a day 5/22/19  Yes Zarina Seo MD   Nutritional Supplements (GLUCERNA 1 0 JOHANN/CARBSTEADY) LIQD Take 1 Can by mouth 3 (three) times a day Dispense 90 cans per month 18  Yes Nigel Plascencia MD   oxyCODONE (ROXICODONE) 20 MG TABS Take 1 tablet (20 mg total) by mouth every 8 (eight) hours as needed for moderate painMax Daily Amount: 60 mg 19  Yes Nigel Plascencia MD   SUPREP BOWEL PREP KIT 17 5-3 13-1 6 GM/177ML SOLN Take 1 kit by mouth once for 1 dose Please follow instructions as per the office  19  Radha Morgan MD   collagenase (SANTYL) ointment Apply 1 application topically daily  9/15/19  Historical Provider, MD   LORazepam (ATIVAN) 1 mg tablet Take 1 tablet (1 mg total) by mouth daily 30 minutes prior to MRI  Patient not taking: Reported on 2019 6/11/18 9/15/19  Aureliano Torres MD   omeprazole (PriLOSEC) 40 MG capsule TAKE 1 CAPSULE(40 MG) BY MOUTH DAILY 4/23/19 9/15/19  Radha Morgan MD     I have reviewed home medications with patient personally      Allergies: No Known Allergies    Social History:     Marital Status: /Civil Union   Occupation: disabled  Patient Pre-hospital Living Situation: home  Patient Pre-hospital Level of Mobility: paraplegic  Patient Pre-hospital Diet Restrictions: none  Substance Use History:   Social History     Substance and Sexual Activity   Alcohol Use Never    Frequency: Never    Comment: Per Allscripts:  Social drinker (Onset date:  11/10/17)     Social History     Tobacco Use   Smoking Status Former Smoker    Packs/day: 0 50    Years: 10 00    Pack years: 5 00    Last attempt to quit: Floresita Hargrove Years since quittin 7   Smokeless Tobacco Never Used   Tobacco Comment    Onset date:  11/10/17     Social History     Substance and Sexual Activity   Drug Use No       Family History:    Family History   Problem Relation Age of Onset    No Known Problems Mother     No Known Problems Father        Physical Exam:     Vitals:   Blood Pressure: 161/67 (19 2300)  Pulse: 100 (19 2300)  Temperature: 98 9 °F (37 2 °C) (190)  Temp Source: Temporal (19 2300)  Respirations: 20 (09/14/19 2300)  Weight - Scale: 68 5 kg (151 lb) (09/14/19 1816)  SpO2: 97 % (09/14/19 2300)    Physical Exam   Constitutional: He is oriented to person, place, and time  Poor physical health with multiple scars and wounds, pleasant affect, in control of his care   HENT:   Head: Normocephalic and atraumatic  Right Ear: External ear normal    Left Ear: External ear normal    Nose: Nose normal    Mouth/Throat: Oropharynx is clear and moist    Eyes:   Right eye with calcification, blind   Neck: Normal range of motion  Neck supple  No JVD present  No tracheal deviation present  No thyromegaly present  Cardiovascular: Normal rate, regular rhythm, normal heart sounds and intact distal pulses  Exam reveals no gallop and no friction rub  No murmur heard  Pulmonary/Chest: Effort normal and breath sounds normal  No stridor  No respiratory distress  He has no wheezes  He has no rales  He exhibits no tenderness  Abdominal: Soft  Bowel sounds are normal  He exhibits distension  He exhibits no mass  There is tenderness  There is no rebound and no guarding  Genitourinary:   Genitourinary Comments: Excoriation, edema of scrotum with purulent drainage from penis   Musculoskeletal: He exhibits edema and deformity  He exhibits no tenderness  Left arm with full ROM, right arm with significant scarring, deformity, right AKA, limited ROM, paraplegia  Left leg +2 pitting edema   Lymphadenopathy:     He has no cervical adenopathy  Neurological: He is alert and oriented to person, place, and time  No cranial nerve deficit  Skin: Skin is warm and dry  Capillary refill takes 2 to 3 seconds  Large sacral decub with tunneling   Psychiatric: He has a normal mood and affect  His behavior is normal  Judgment and thought content normal    Nursing note and vitals reviewed  Additional Data:     Lab Results: I have personally reviewed pertinent reports        Results from last 7 days   Lab Units 09/14/19  2201   WBC Thousand/uL 13 90*   HEMOGLOBIN g/dL 8 7*   HEMATOCRIT % 27 0*   PLATELETS Thousands/uL 436   NEUTROS PCT % 83*   LYMPHS PCT % 8*   MONOS PCT % 8   EOS PCT % 1     Results from last 7 days   Lab Units 09/14/19  2201   SODIUM mmol/L 133*   POTASSIUM mmol/L 3 3*   CHLORIDE mmol/L 98   CO2 mmol/L 27   BUN mg/dL 18   CREATININE mg/dL 0 50*   ANION GAP mmol/L 8   CALCIUM mg/dL 8 4   ALBUMIN g/dL 3 3   TOTAL BILIRUBIN mg/dL 0 40   ALK PHOS U/L 95   ALT U/L 8*   AST U/L 19   GLUCOSE RANDOM mg/dL 105*     Results from last 7 days   Lab Units 09/14/19  2201   INR  1 15*     Results from last 7 days   Lab Units 09/15/19  0114   POC GLUCOSE mg/dl 157*         Results from last 7 days   Lab Units 09/14/19  2201   LACTIC ACID mmol/L 0 8       Imaging: I have personally reviewed pertinent reports  CT abdomen pelvis wo contrast   Final Result by Krissy Ernandez MD (09/14 2338)      Unremarkable left lower quadrant colostomy  No evidence of obstruction      Small amount of fluid within the left perirectal/mesorectal space, likely residual to prior infection, without significant evidence of a deep pelvic and perineal soft tissue infection as seen on prior exam of 8/23/2019  Residual focus of air seen along    the right inferior pubic rami, which may be ulcer related               Workstation performed: [id number]         XR chest portable - 1 view    (Results Pending)       EKG, Pathology, and Other Studies Reviewed on Admission:   · EKG: Sinus tachycardia    Allscripts / Epic Records Reviewed: Yes     ** Please Note: This note has been constructed using a voice recognition system   **

## 2019-09-15 NOTE — DISCHARGE SUMMARY
Discharge Summary - Scooter Wolf    Patient Information: Rafy Cabrera 63 y o  male MRN: [id number]  Unit/Bed#: 5T -01 Encounter: [id number]    Discharging Physician / Practitioner: Elio Herndon    PCP: Thor Siu MD  Admission Date:   Admission Orders (From admission, onward)     Ordered        09/14/19 2258  Inpatient Admission (expected length of stay for this patient Order details is greater than two midnights)  Once                   Discharge Date:09/16/2019  Reason for Admission:   Sepsis of unknown source    Discharge Diagnoses:     Principal Problem:    Sepsis ([address] )  Active Problems:    Complicated urinary tract infection    Stage IV pressure ulcer of sacral region ([address] )    Anemia    Paraplegic spinal paralysis ([address] )    Benign essential hypertension    Diabetes mellitus type II, controlled ([address] )    Opioid use    Colostomy in place ([address] )    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, subsequent encounter    History of Clostridium difficile colitis    Hyperkalemia  Resolved Problems:    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, stage 3 ([address] )      Consultations During Hospital Stay:  · Infectious disease    Procedures Performed:   · none    Significant Findings / Test Results:   · none    Incidental Findings:   · none    Test Results Pending at Discharge (will require follow up):   · none     Outpatient Tests Requested:  · none    Complications: none    Hospital Course: Rafy Cabrera is a 63 y o  male patient with multiple complex medical illnesses most pertinent are recurrent complex UTIs, chronic Solomon catheter usage, and penile implant who originally presented to the hospital on 9/14/2019 due to sepsis with the possible sources of urinary origin  Urinalysis on admission was negative for nitrites but positive leukocyte estrase with no complains of urinary symptoms    The patient was given 1 dose of Vanco as well as 2 g of cefepime which was later continued to just 2 g of cefepime twice daily  Patient was also placed on p o  vancomycin for C diff prophylaxis  All antibiotics were discontinued given the less likelihood of a UTI and no current source of infection  The patient clinical picture was also improving with no noted fever  However his procalcitonin was elevated at 0 72  Of note there was a question of possible complicating infection in the penile prosthesis  Patient a was evaluated by both Urology and surgery and was not felt to have a necrotizing infection at this site  He was treated for urinary tract infection due to obstruction  with oral Keflex for 14 day course which he would have completed on 09/04  The patient was ultimately recommended for penile implant removal as an outpatient  He also reports that after his discharge he was seen by Urology as an outpatient and was plan for elective removal of his penile implant after his urologist returns from vacation  He reports that when he was discharged from [facility] he was sent home with a catheter however he had that catheter removed on 09/06  He reports that on 09/14 he attempted to straight catheterize himself at home and was not able to access his bladder for drainage and was reaching resistance  Patient was seen by ID today who recommends  Antibiotic coverage with ceftriaxone and Urology follow up as outpatient  Patient reports that he plans to have prosthesis removed by his urologist  Pe    Patient was examined today, no acute distress,  Denies any pain, fever, chills, chest pain and SOB   Patient would like to follow up with Urology as outpatient      Condition at Discharge: good     Discharge Day Visit / Exam:     Vitals: Blood Pressure: 99/62 (09/15/19 0733)  Pulse: 89 (09/15/19 0733)  Temperature: 98 1 °F (36 7 °C) (09/15/19 0733)  Temp Source: Temporal (09/15/19 0733)  Respirations: 16 (09/15/19 0733)  Weight - Scale: 68 5 kg (151 lb) (09/14/19 1816)  SpO2: 100 % (09/15/19 0996)  Exam:   Physical Exam   Constitutional: No distress  HENT:   Head: Normocephalic and atraumatic  Eyes: Pupils are equal, round, and reactive to light  Blindness in right eye   Neck: Normal range of motion  Cardiovascular: Normal rate and regular rhythm  No murmur heard  Pulmonary/Chest: Effort normal and breath sounds normal  No respiratory distress  He has no wheezes  Abdominal: Soft  Bowel sounds are normal    Colostomy bag in place   Genitourinary: No penile tenderness  Genitourinary Comments: Purulent Discharge coming from prosthesis  Scrotum edema   Musculoskeletal: He exhibits deformity  Left arm with full ROM, Right arm with scarring and some deformity  Right AKA  Limited ROM  Paraplegic   Skin: Skin is warm  Capillary refill takes less than 2 seconds  Large sacral decubitus ulcer   Psychiatric: He has a normal mood and affect  Discussion with Family: none    Discharge instructions/Information to patient and family:   See after visit summary for information provided to patient and family        Discharge Medications:    ACCU-CHEK SMARTVIEW test strip   Generic drug: glucose blood   TEST TWICE DAILY   Refills: 3           ASPIRIN LOW DOSE 81 mg EC tablet   Generic drug: aspirin   TAKE 1 TABLET BY MOUTH EVERY DAY   Refills: 0   Next Dose Due: 9/17 0900          GLUCERNA 1 0 JOHANN/CARBSTEADY Liqd   For: dispense 90 cans per month   Take 1 Can by mouth 3 (three) times a day Dispense 90 cans per month   Refills: 11          ibuprofen 800 mg tablet   Commonly known as: MOTRIN   TAKE 1 TABLET(800 MG) BY MOUTH DAILY AS NEEDED FOR MODERATE PAIN   Refills: 0          oxyCODONE 20 MG Tabs   Commonly known as: ROXICODONE   Take 1 tablet (20 mg total) by mouth every 8 (eight) hours as needed for moderate painMax Daily Amount: 60 mg   Refills: 0   Doctor's comments: Continuation of therapy, do not fill before 3/22/2019   Next Dose Due: Every 8 hours as needed for moderate pain SUPREP BOWEL PREP KIT 17 5-3 13-1 6 GM/177ML Soln   Generic drug: Na Sulfate-K Sulfate-Mg Sulf   Take 1 kit by mouth once for 1 dose Please follow instructions as per the office  Refills: 0                             Provisions for Follow-Up Care:  See after visit summary for information related to follow-up care and any pertinent home health orders  Disposition:     Transferred to [facility]    For Discharges to [facility] SNF:   · Not Applicable to this Patient - Not Applicable to this Patient    Planned Readmission: none     Discharge Statement:  I spent 30 minutes discharging the patient  This time was spent on the day of discharge  I had direct contact with the patient on the day of discharge  Greater than 50% of the total time was spent examining patient, answering all patient questions, arranging and discussing plan of care with patient as well as directly providing post-discharge instructions  Additional time then spent on discharge activities      ** Please Note: This note has been constructed using a voice recognition system **    William Bush MD  9/16/2019  11:35

## 2019-09-15 NOTE — SEPSIS NOTE
Sepsis Note   Valentino Harvest Moscat 63 y o  male MRN: [id number]  Unit/Bed#: 5T -01 Encounter: [id number]      qSOFA     Row Name 09/14/19 2300 09/14/19 2230 09/14/19 2215 09/14/19 2200 09/14/19 2148    Altered mental status GCS < 15              Respiratory Rate > / =22  0  0  0  0  0    Systolic BP < / =695  0      0  1    Q Sofa Score  0  0  0  0  1    Row Name 09/14/19 2145 09/14/19 2130 09/14/19 2115 09/14/19 2100 09/14/19 2045    Altered mental status GCS < 15              Respiratory Rate > / =22  0  0  0  0  1    Systolic BP < / =161              Q Sofa Score  0  0  0  0  1    Row Name 09/14/19 1944 09/14/19 1816             Altered mental status GCS < 15  0         Respiratory Rate > / =22    0       Systolic BP < / =905    0       Q Sofa Score  0  0           Initial Sepsis Screening     Row Name 09/14/19 2142                Is the patient's history suggestive of a new or worsening infection? (!) Yes (Proceed)  -AW        Suspected source of infection  implant or prosthesis infection;urinary tract infection  -AW        Are two or more of the following signs & symptoms of infection both present and new to the patient? (!) Yes (Proceed)  -AW        Indicate SIRS criteria  Hyperthemia > 38 3C (100 9F); Tachycardia > 90 bpm  -AW        If the answer is yes to both questions, suspicion of sepsis is present          If severe sepsis is present AND tissue hypoperfusion perists in the hour after fluid resuscitation or lactate > 4, the patient meets criteria for SEPTIC SHOCK          Are any of the following organ dysfunction criteria present within 6 hours of suspected infection and SIRS criteria that are NOT considered to be chronic conditions?   (!) Yes  -AW        Organ dysfunction          Date of presentation of severe sepsis  09/14/19  -AW        Time of presentation of severe sepsis  1900  -AW        Tissue hypoperfusion persists in the hour after crystalloid fluid administration, evidenced, by either:          Was hypotension present within one hour of the conclusion of crystalloid fluid administration?         Date of presentation of septic shock  09/14/19  -AW        Time of presentation of septic shock  1900  -AW          User Key  (r) = Recorded By, (t) = Taken By, (c) = Cosigned By    Initials Name Provider Type    AW Marice Phalen, PA-C Physician Assistant              Delay in cultures sent due to inability to access a line  Central line placed and all cultures sent at that time, from [facility]

## 2019-09-15 NOTE — ED PROVIDER NOTES
History  Chief Complaint   Patient presents with    Fever - 9 weeks to 76 years     brought in by EMS - has had a fever since wedensday - was seen by FMD yesterday and was to have blood work done -  did not get blood drawn - took a urine sample with him  - was told he has infection in urine - did not want medication till tests returned - continues with fever - at 1700 had difficullt straight cathing self         This is a 60-year-old male with an extensive past medical history of right-sided amputation, hypertension, dyslipidemia, neurogenic bladder, blindness of the right eye, status post colostomy, sacral ulcer, status post penile prosthesis implant with 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, and chronic cystitis who presents today for a fever that started today  The patient reports he went to his primary care provider yesterday and was told to get blood work  When he presented to the lab to get blood work they could not get any specimen  Patient reports he has had a fever for the past day  He reports no chest pain or shortness of breath or cough  He denies any abdominal pain  He reports he could not straight cath himself today  The patient has an extensive history of a chronic penile prosthesis implant with known infection  He straight caths himself 3 times daily  He reports he has not been able to straight cath himself since noon today  He reports abdominal distension, but no significant pain  He has a colostomy bag in place and reports that the stool has been normal   There has been no blood  He has not taken Motrin or Tylenol today for his symptoms  Patient was admitted in May of 2019 and seen by Urology and General surgery  He has chronic scrotal edema and swelling and erythema with multiple ulcers  Per General surgery, he does not have Vega gangrene despite gas noted on CT in May of 2019   The patient reports that he has no pain in his scrotum and reports that his swelling and ulcers are chronic  Per Urology, patient is refusing explant of his penile implant  He is aware of the consequences of this  Attempted to get peripheral access in the patient  Patient refused IJ central line initially  After multiple attempts for multiple providers and nursing staff to get peripheral access, the patient was agreeable to IJ central line  Critical Care Medicine performed this procedure in the emergency department  Blood cultures were obtained from the central line  Prior to Admission Medications   Prescriptions Last Dose Informant Patient Reported? Taking? ACCU-CHEK FASTCLIX LANCETS [facility] 9/15/2019 at Unknown time Self No Yes   Sig: by Other route 2 (two) times a day Test as directed   ACCU-CHEK SMARTVIEW test strip 9/15/2019 at Unknown time  No Yes   Sig: TEST TWICE DAILY   ASPIRIN LOW DOSE 81 MG EC tablet 9/15/2019 at Unknown time  No Yes   Sig: TAKE 1 TABLET BY MOUTH EVERY DAY   Disposable Gloves (LATEX GLOVES LARGE) MISC 9/15/2019 at Unknown time Self No Yes   Sig: Use as needed up to 3 times a day dispo 2 boxes   Incontinence Supply Disposable (SUNBEAM INCONTINENT UNDER PAD) MISC 9/15/2019 at Unknown time Self No Yes   Sig: Use 1 per week, dispense 4 reusable underpads   Incontinence Supply Disposable MISC 9/15/2019 at Unknown time Self No Yes   Sig: by Does not apply route 2 (two) times a day   Nutritional Supplements (GLUCERNA 1 0 JOHANN/CARBSTEADY) LIQD 9/15/2019 at Unknown time Self No Yes   Sig: Take 1 Can by mouth 3 (three) times a day Dispense 90 cans per month   SUPREP BOWEL PREP KIT 17 5-3 13-1 6 GM/177ML SOLN   No No   Sig: Take 1 kit by mouth once for 1 dose Please follow instructions as per the office     ibuprofen (MOTRIN) 800 mg tablet 9/14/2019 at Unknown time  No Yes   Sig: TAKE 1 TABLET(800 MG) BY MOUTH DAILY AS NEEDED FOR MODERATE PAIN   oxyCODONE (ROXICODONE) 20 MG TABS 9/15/2019 at Unknown time  No Yes   Sig: Take 1 tablet (20 mg total) by mouth every 8 (eight) hours as needed for moderate painMax Daily Amount: 60 mg      Facility-Administered Medications: None       Past Medical History:   Diagnosis Date    Anemia     Blind     r eye    Chronic cystitis     Colostomy in place [facility])     Detrusor sphincter dyssynergia     Diabetes mellitus ([facility] [address] )     Poorly controlled type 2; Last Assessed:  3/18/14    Erectile dysfunction     Frequency of urination     GERD (gastroesophageal reflux disease)     History of diabetes mellitus     History of osteomyelitis     Hx of leg amputation (HCC)     r high upper leg    Hyperlipidemia     Hypertension     Hypospadias     Incomplete bladder emptying     Neurogenic bladder     Paralysis (HCC)     [facility])     Spinal cord cysts     Ulcer of sacral region ([facility] [address] )     Urge incontinence        Past Surgical History:   Procedure Laterality Date    AMPUTATION      At hip; Last Assessed:  1/19/16    BLADDER SURGERY      COLON SURGERY      llq ostomy pouch    COLOSTOMY      COMPLEX CYSTOMETROGRAM  2014    CYSTOSCOPY  2014    LEG AMPUTATION      MEATOTOMY      PENILE PROSTHESIS IMPLANT  2011    NV ADJ TISS XFER SCALP,EXTREM 10 1-30 SQCM Left 5/1/2017    Procedure: POSTERIOR THIGH V-Y ADMANCEMENT;  Surgeon: Cassidy Chun MD;  [facility];  Service: Plastics    NV MUSCLE-SKIN FLAP,TRUNK Left 5/1/2017    Procedure: FLAP CLOSURE LEFT ISCHIAL WOUND and "RIGHT" ISCHIAL FLAP ADVANCEMENT * DEBRIDEMENT, [place] ;  Surgeon: Cassidy Chun MD;  [facility];  Service: Plastics    NV MUSCLE-SKIN FLAP,TRUNK Left 9/27/2017    Procedure: gluteal myocutaneous rotational flap, posterior thigh v to y advancement- wound 5 x 2 5 x 8;  Surgeon: Cassidy Chun MD;  [facility];  Service: Plastics    SPINE SURGERY      Lower back    UROFLOWMETRY SIMPLE / COMPLEX  2014       Family History   Problem Relation Age of Onset    No Known Problems Mother     No Known Problems Father      I have reviewed and agree with the history as documented  Social History     Tobacco Use    Smoking status: Former Smoker     Packs/day: 0 50     Years: 10 00     Pack years: 5 00     Last attempt to quit:      Years since quittin 7    Smokeless tobacco: Never Used    Tobacco comment: Onset date:  11/10/17   Substance Use Topics    Alcohol use: Never     Frequency: Never     Comment: Per [employer]:  Social drinker (Onset date:  11/10/17)    Drug use: No        Review of Systems   Constitutional: Positive for fever  HENT: Negative  Eyes: Negative  Respiratory: Negative  Cardiovascular: Negative  Gastrointestinal: Positive for abdominal distention  Endocrine: Negative  Genitourinary: Positive for decreased urine volume, difficulty urinating, penile swelling and scrotal swelling  Negative for testicular pain and urgency  Musculoskeletal: Negative  Allergic/Immunologic: Negative  Neurological: Negative  Hematological: Negative  Psychiatric/Behavioral: Negative  Physical Exam  Physical Exam   Constitutional: He is oriented to person, place, and time  No distress  Patient appears chronically ill  HENT:   Head: Normocephalic and atraumatic  Eyes: Pupils are equal, round, and reactive to light  Patient is blind in right eye  Neck: Normal range of motion  Cardiovascular: Normal rate  No murmur heard  Patient is tachycardic  Patient has right leg amputation to hip  Pulmonary/Chest: Effort normal and breath sounds normal    Abdominal: Soft  He exhibits distension  There is no tenderness  There is no guarding  Colostomy bag in place  No gross abnormality or erythema surrounding site  Genitourinary:   Genitourinary Comments: Noted scrotal swelling with multiple ulcers present on scrotum  Noted posterior penis erosion as described by Urology in previous notes  Per patient, this is chronic and unchanged    Patient had Solomon placed in the emergency department  Neurological: He is alert and oriented to person, place, and time  Skin: Skin is warm  Capillary refill takes 2 to 3 seconds  He is not diaphoretic  Psychiatric: He has a normal mood and affect   His behavior is normal  Judgment and thought content normal        Vital Signs  ED Triage Vitals   Temperature Pulse Respirations Blood Pressure SpO2   09/14/19 1816 09/14/19 1816 09/14/19 1816 09/14/19 1816 09/14/19 1816   (!) 102 4 °F (39 1 °C) (!) 120 16 117/89 100 %      Temp Source Heart Rate Source Patient Position - Orthostatic VS BP Location FiO2 (%)   09/14/19 1816 09/14/19 1816 09/14/19 1816 09/14/19 1816 --   Tympanic Monitor Sitting Left arm       Pain Score       09/14/19 1841       No Pain           Vitals:    09/14/19 2215 09/14/19 2230 09/14/19 2300 09/15/19 0733   BP:   161/67 99/62   Pulse: 103 91 100 89   Patient Position - Orthostatic VS: Lying  Lying Lying         Visual Acuity      ED Medications  Medications   aspirin (ECOTRIN LOW STRENGTH) EC tablet 81 mg (81 mg Oral Given 9/15/19 0930)   oxyCODONE (ROXICODONE) IR tablet 20 mg (20 mg Oral Given 9/15/19 0930)   enoxaparin (LOVENOX) subcutaneous injection 40 mg (40 mg Subcutaneous Given 9/15/19 0929)   ferrous sulfate tablet 325 mg (325 mg Oral Given 9/15/19 0932)   ascorbic acid (VITAMIN C) tablet 500 mg (500 mg Oral Given 9/15/19 0932)   saccharomyces boulardii (FLORASTOR) capsule 250 mg (250 mg Oral Given 9/15/19 1230)   cefepime (MAXIPIME) IVPB (premix) 2,000 mg (0 mg Intravenous Stopped 9/14/19 2236)   ibuprofen (MOTRIN) tablet 800 mg (800 mg Oral Given 9/14/19 1841)   fentanyl citrate (PF) 100 MCG/2ML 25 mcg (25 mcg Intravenous Given 9/14/19 2139)   vancomycin (VANCOCIN) IVPB (premix) 1,000 mg (0 mg Intravenous Stopped 9/15/19 0118)   potassium chloride (K-DUR,KLOR-CON) CR tablet 40 mEq (40 mEq Oral Given 9/15/19 0100)       Diagnostic Studies  Results Reviewed     Procedure Component Value Units Date/Time    Procalcitonin [[id number]]  (Abnormal) Collected:  09/14/19 2201    Lab Status:  Final result Specimen:  Blood from Line, Venous Updated:  09/15/19 1404     Procalcitonin 0 76 ng/ml     Iron Saturation % [[id number]]  (Abnormal) Collected:  09/14/19 2201    Lab Status:  Final result Specimen:  Blood from Central Venous Line Updated:  09/15/19 1327     Iron Saturation 8 %      TIBC 186 ug/dL      Iron 15 ug/dL     Ferritin [[id number]]  (Normal) Collected:  09/14/19 2201    Lab Status:  Final result Specimen:  Blood from Central Venous Line Updated:  09/15/19 1327     Ferritin 278 ng/mL     Comprehensive metabolic panel [[id number]]  (Abnormal) Collected:  09/14/19 2201    Lab Status:  Final result Specimen:  Blood from Central Venous Line Updated:  09/14/19 2229     Sodium 133 mmol/L      Potassium 3 3 mmol/L      Chloride 98 mmol/L      CO2 27 mmol/L      ANION GAP 8 mmol/L      BUN 18 mg/dL      Creatinine 0 50 mg/dL      Glucose 105 mg/dL      Calcium 8 4 mg/dL      AST 19 U/L      ALT 8 U/L      Alkaline Phosphatase 95 U/L      Total Protein 8 2 g/dL      Albumin 3 3 g/dL      Total Bilirubin 0 40 mg/dL      eGFR 119 ml/min/1 73sq m     Narrative:       [place] guidelines for Chronic Kidney Disease (CKD):     Stage 1 with normal or high GFR (GFR > 90 mL/min/1 73 square meters)    Stage 2 Mild CKD (GFR = 60-89 mL/min/1 73 square meters)    Stage 3A Moderate CKD (GFR = 45-59 mL/min/1 73 square meters)    Stage 3B Moderate CKD (GFR = 30-44 mL/min/1 73 square meters)    Stage 4 Severe CKD (GFR = 15-29 mL/min/1 73 square meters)    Stage 5 End Stage CKD (GFR <15 mL/min/1 73 square meters)  Note: GFR calculation is accurate only with a steady state creatinine    Protime-INR [[id number]]  (Abnormal) Collected:  09/14/19 2201    Lab Status:  Final result Specimen:  Blood from Line, Venous Updated:  09/14/19 2227     Protime 12 6 seconds      INR 1 15    APTT [[id number]]  (Normal) Collected:  09/14/19 2201 Lab Status:  Final result Specimen:  Blood from Line, Venous Updated:  09/14/19 2227     PTT 27 seconds     Lactic acid x2 [[id number]]  (Normal) Collected:  09/14/19 2201    Lab Status:  Final result Specimen:  Blood from Central Venous Line Updated:  09/14/19 2225     LACTIC ACID 0 8 mmol/L     Narrative:       Result may be elevated if tourniquet was used during collection  CBC and differential [[id number]]  (Abnormal) Collected:  09/14/19 2201    Lab Status:  Final result Specimen:  Blood from Central Venous Line Updated:  09/14/19 2216     WBC 13 90 Thousand/uL      RBC 3 65 Million/uL      Hemoglobin 8 7 g/dL      Hematocrit 27 0 %      MCV 74 fL      MCH 23 8 pg      MCHC 32 0 g/dL      RDW 17 0 %      MPV 7 9 fL      Platelets 600 Thousands/uL      Neutrophils Relative 83 %      Lymphocytes Relative 8 %      Monocytes Relative 8 %      Eosinophils Relative 1 %      Basophils Relative 1 %      Neutrophils Absolute 11 50 Thousands/µL      Lymphocytes Absolute 1 10 Thousands/µL      Monocytes Absolute 1 10 Thousand/µL      Eosinophils Absolute 0 10 Thousand/µL      Basophils Absolute 0 10 Thousands/µL     Blood culture #2 [[id number]] Collected:  09/14/19 2202    Lab Status: In process Specimen:  Blood from Central Venous Line Updated:  09/14/19 2212    Blood culture #1 [[id number]] Collected:  09/14/19 2201    Lab Status: In process Specimen:  Blood from Central Venous Line Updated:  09/14/19 2211    Lactic acid x2 [[id number]] Collected:  09/14/19 2204    Lab Status:  No result Specimen:  Blood from Central Venous Line     Urine Microscopic [[id number]]  (Abnormal) Collected:  09/14/19 1943    Lab Status:  Final result Specimen:  Urine, Indwelling Solomon Catheter Updated:  09/14/19 1957     RBC, UA 2-4 /hpf      WBC, UA Innumerable /hpf      Epithelial Cells Occasional /hpf      Bacteria, UA Innumerable /hpf     Urine culture [[id number]] Collected:  09/14/19 1943    Lab Status:   In process Specimen:  Urine, Indwelling Solomon Catheter Updated:  09/14/19 1957    UA w Reflex to Microscopic w Reflex to Culture [[id number]]  (Abnormal) Collected:  09/14/19 1943    Lab Status:  Final result Specimen:  Urine, Indwelling Solomon Catheter Updated:  09/14/19 1949     Color, UA Yellow     Clarity, UA Cloudy     Specific [place], UA 1 010     pH, UA 6 0     Leukocytes,  0     Nitrite, UA Negative     Protein, UA 30 (1+) mg/dl      Glucose, UA Negative mg/dl      Ketones, UA Negative mg/dl      Bilirubin, UA Negative     Blood,  0     UROBILINOGEN UA Negative mg/dL                  CT abdomen pelvis wo contrast   Final Result by Sugar Marroquin MD (09/14 8269)      Unremarkable left lower quadrant colostomy  No evidence of obstruction      Small amount of fluid within the left perirectal/mesorectal space, likely residual to prior infection, without significant evidence of a deep pelvic and perineal soft tissue infection as seen on prior exam of 8/23/2019  Residual focus of air seen along    the right inferior pubic rami, which may be ulcer related               Workstation performed: [id number]         XR chest portable - 1 view   Final Result by Corinne Hatch MD (09/15 1342)      No acute cardiopulmonary disease  Workstation performed: [id number]                    Procedures  ECG 12 Lead Documentation Only  Date/Time: 9/14/2019 10:40 PM  Performed by: Charlee Ladd PA-C  Authorized by: Charlee Ladd PA-C     Indications / Diagnosis:  Tachycardia  Patient location:  ED  Interpretation:     Interpretation: normal    Rate:     ECG rate:  125    ECG rate assessment: tachycardic    Rhythm:     Rhythm: sinus rhythm    Ectopy:     Ectopy: none    QRS:     QRS axis:  Normal           ED Course  ED Course as of Sep 15 1503   Sat Sep 14, 2019   1918 Difficult to get peripheral access      2042 Patient refusing Central line  Attempting peripheral access         2141 After multiple attempts for a peripheral line, patient finally agreeable to central IJ line  BC x 2 obtained from CL given patient has poor peripheral vascularity and access  Initial Sepsis Screening     Row Name 09/14/19 2142                Is the patient's history suggestive of a new or worsening infection? (!) Yes (Proceed)  -AW        Suspected source of infection  implant or prosthesis infection;urinary tract infection  -AW        Are two or more of the following signs & symptoms of infection both present and new to the patient? (!) Yes (Proceed)  -AW        Indicate SIRS criteria  Hyperthemia > 38 3C (100 9F); Tachycardia > 90 bpm  -AW        If the answer is yes to both questions, suspicion of sepsis is present          If severe sepsis is present AND tissue hypoperfusion perists in the hour after fluid resuscitation or lactate > 4, the patient meets criteria for SEPTIC SHOCK          Are any of the following organ dysfunction criteria present within 6 hours of suspected infection and SIRS criteria that are NOT considered to be chronic conditions? (!) Yes  -AW        Organ dysfunction          Date of presentation of severe sepsis  09/14/19  -AW        Time of presentation of severe sepsis  1900  -AW        Tissue hypoperfusion persists in the hour after crystalloid fluid administration, evidenced, by either:          Was hypotension present within one hour of the conclusion of crystalloid fluid administration?           Date of presentation of septic shock  09/14/19  -AW        Time of presentation of septic shock  1900  -AW          User Key  (r) = Recorded By, (t) = Taken By, (c) = Cosigned By    Initials Name Provider Type    RALF Swenson PA-C Physician Assistant           Default Flowsheet Data (last 720 hours)      Sepsis Reassess     Row Name 09/15/19 0104                   Repeat Volume Status and Tissue Perfusion Assessment Performed    Repeat Volume Status and Tissue Perfusion Assessment Performed   Volume Status and Tissue Perfusion Post Fluid Resuscitation * Must Document All *    Vital Signs Reviewed (HR, RR, BP, T)  Yes  -EJ        Shock Index Reviewed  Yes  -EJ        Arterial Oxygen Saturation Reviewed (POx, SaO2 or SpO2)  Yes (comment %)  -EJ        Cardio  Normal S1/S2; Regular rate and rhythm; No murmor; No rub or gallop  -EJ        Pulmonary  Normal effort;Clear to auscultation  -EJ        Capillary Refill  Brisk  -EJ        Peripheral Pulses  Radial  -EJ        Peripheral Pulse  +2  -EJ        Skin  Warm;Dry  -EJ        Urine output assessed  Adequate  -EJ           *OR*   Intensive Monitoring- Must Document One of the Following Four *:    Vital Signs Reviewed          * Central Venous Pressure (CVP or RAP)          * Central Venous Oxygen (SVO2, ScvO2 or Oxygen saturation via central catheter)          * Bedside Cardiovascular US in IVC diameter and % collapse          * Passive Leg Raise OR Crystalloid Challenge            User Key  (r) = Recorded By, (t) = Taken By, (c) = Cosigned By    Initials Name Provider Type    EJ Valentina , [address]  Nurse Practitioner                MDM  Number of Diagnoses or Management Options  Vega's disease:   Sepsis [facility]):   UTI (urinary tract infection):   Diagnosis management comments: This is a 64-year-old male with multiple chronic medical issues  IV access peripherally was difficult  Initially, patient refused central line was agreeable when no peripheral access could be obtained  This prolonged obtaining labs and starting IV antibiotics  The patient was not in any acute distress and was fully competent and able to give consent for his care  Patient was admitted to the ICU under the service of Dr Neri Allred  Given the patient's reluctance for invasive surgical procedures, consider palliative medicine consult        Disposition  Final diagnoses:   Sepsis ([address] )   UTI (urinary tract infection)   Vega's disease     Time reflects when diagnosis was documented in both MDM as applicable and the Disposition within this note     Time User Action Codes Description Comment    9/14/2019 10:53 PM Cloretta Rodrigo R Add [A41 9] Sepsis ([facility] [address] )     9/14/2019 10:53 PM Cloretta Rodrigo R Add [N39 0] UTI (urinary tract infection)     9/14/2019 10:53 PM Cloretta Rodrigo R Add [N49 3] Vega's disease     9/14/2019 11:43 PM Weston List Modify [A41 9] Sepsis ([facility] [address] )     9/14/2019 11:43 PM Weston List Add [R85 0] Complicated urinary tract infection     9/14/2019 11:43 PM Weston List Modify [A41 9] Sepsis ([facility] [address] )     9/14/2019 11:44 PM Weston List Modify [A41 9] Sepsis ([facility] [address] )     9/14/2019 11:44 PM Weston List Modify [A41 9] Sepsis ([facility] [address] )     9/14/2019 11:44 PM Weston List Add [L89 154] Stage IV pressure ulcer of sacral region ([facility] [address] )     9/14/2019 11:44 PM Weston List Modify [A41 9] Sepsis ([facility] [address] )     9/14/2019 11:44 PM Weston List Modify [E17 614] Stage IV pressure ulcer of sacral region ([facility] [address] )     9/14/2019 11:44 PM Weston List Modify [A41 9] Sepsis ([facility] [address] )     9/14/2019 11:44 PM Weston List Modify [A41 9] Sepsis ([facility] [address] )     9/14/2019 11:44 PM Weston List Add [W91 331] 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, stage 3 ([facility] [address] )     9/14/2019 11:44 PM Weston List Modify [A41 9] Sepsis ([facility] [address] )     9/14/2019 11:44 PM Weston List Modify [P69 711] 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, stage 3 ([facility] [address] )     9/14/2019 11:44 PM Weston List Modify [A41 9] Sepsis ([facility] [address] )     9/14/2019 11:44 PM Pablo List Modify [A41 9] Sepsis [facility])       ED Disposition     ED Disposition Condition Date/Time Comment    Admit Stable Sat Sep 14, 2019 10:53 PM Case was discussed with Giuseppe Campbell and the patient's admission status was agreed to be Admission Status: inpatient status to the service of Dr Golden Johnson          Follow-up Information    None         Current Discharge Medication List      CONTINUE these medications which have NOT CHANGED    Details   ACCU-CHEK FASTCLIX LANCETS MISC by Other route 2 (two) times a day Test as directed  Qty: 102 each, Refills: 5    Associated Diagnoses: Type 2 diabetes mellitus without complication, without long-term current use of insulin ([facility])      ACCU-CHEK SMARTVIEW test strip TEST TWICE DAILY  Qty: 100 each, Refills: 3    Associated Diagnoses: Type 2 diabetes mellitus without complication, without long-term current use of insulin ([facility])      ASPIRIN LOW DOSE 81 MG EC tablet TAKE 1 TABLET BY MOUTH EVERY DAY  Qty: 30 tablet, Refills: 0    Associated Diagnoses: Chronic low back pain without sciatica, unspecified back pain laterality      Disposable Gloves (LATEX GLOVES LARGE) MISC Use as needed up to 3 times a day dispo 2 boxes  Qty: 2 each, Refills: 11    Associated Diagnoses: Neurogenic dysfunction of the urinary bladder; Paraplegia ([address] ); Stage IV pressure ulcer of left buttock ([address] ); Type 2 diabetes mellitus without complication, without long-term current use of insulin ([facility])      ibuprofen (MOTRIN) 800 mg tablet TAKE 1 TABLET(800 MG) BY MOUTH DAILY AS NEEDED FOR MODERATE PAIN  Qty: 60 tablet, Refills: 0    Associated Diagnoses: Chronic low back pain without sciatica, unspecified back pain laterality      !! Incontinence Supply Disposable (SUNBEAM INCONTINENT UNDER PAD) MISC Use 1 per week, dispense 4 reusable underpads  Qty: 4 each, Refills: 11    Associated Diagnoses: Neurogenic dysfunction of the urinary bladder; Paraplegia ([address] ); Stage IV pressure ulcer of left buttock ([address] )      !! Incontinence Supply Disposable MISC by Does not apply route 2 (two) times a day  Qty: 60 each, Refills: 11    Associated Diagnoses: Neurogenic dysfunction of the urinary bladder; Paraplegia ([address] ); Stage IV pressure ulcer of left buttock ([facility])      Nutritional Supplements (GLUCERNA 1 0 JOHANN/CARBSTEADY) LIQD Take 1 Can by mouth 3 (three) times a day Dispense 90 cans per month  Qty: 237 mL, Refills: 11    Associated Diagnoses: Neurogenic dysfunction of the urinary bladder; Paraplegia ([address] );  Stage IV pressure ulcer of left buttock ([facility] [address] ); Type 2 diabetes mellitus without complication, without long-term current use of insulin (HCC)      oxyCODONE (ROXICODONE) 20 MG TABS Take 1 tablet (20 mg total) by mouth every 8 (eight) hours as needed for moderate painMax Daily Amount: 60 mg  Qty: 90 tablet, Refills: 0    Comments: Continuation of therapy, do not fill before 3/22/2019  Associated Diagnoses: Paraplegic spinal paralysis (HCC)      SUPREP BOWEL PREP KIT 17 5-3 13-1 6 GM/177ML SOLN Take 1 kit by mouth once for 1 dose Please follow instructions as per the office  Qty: 2 Bottle, Refills: 0    Associated Diagnoses: Colon cancer screening       !! - Potential duplicate medications found  Please discuss with provider  No discharge procedures on file      ED Provider  Electronically Signed by           Bernardino Gross PA-C  09/15/19 0149

## 2019-09-15 NOTE — ED ATTENDING ATTESTATION
Jeff Seay MD, saw and evaluated the patient  I have discussed the patient with the resident/non-physician practitioner and agree with the non-physician practitioner's findings, Plan of Care, and MDM as documented in the non-physician practitioner's note, except where noted  All available labs and Radiology studies were reviewed  I was present for key portions of any procedure(s) performed by the non-physician practitioner and I was immediately available to provide assistance  At this point I agree with the current assessment done in the Emergency Department      Critical Care Time  Procedures

## 2019-09-15 NOTE — QUICK NOTE
Spoke with Dr Steve Trevino from [facility] team who recommended to continue with Cefepime 2g BID given that patient was septic, and the elevated number of WBC seen on UA  UC and BC are currently pending  Previous UC showed 30k-39k E coli that were resistant to flouroquinolone and tetracyclines only but susceptible to all other antibiotics  ID also advised highly to have urology see patient for intervention sooner than later as patient's urologist is currently on vacation and is not available for about 2 weeks  Currently patient is afebrile but tachycardic and BP of 120/80, no reported complaints by the patient or RN at the moment       Maykel Moon MD  09/15/19  7:02 PM

## 2019-09-15 NOTE — ASSESSMENT & PLAN NOTE
Microcytic anemia with 3 gm drop since 8/29  Will hemoccult stool  Check iron panel  Transfuse for hemoglobin <7

## 2019-09-15 NOTE — NURSING NOTE
Colostomy appliance changed to our equipment to be able to empty and measure stool  Stoma is pink with no visible breakdown  Stoma cleaned and skin prepped prior to putting on the colostomy  Old dressing removed from sacrum wound and new dressing, wet to dry applied   Patient tolerated well

## 2019-09-16 ENCOUNTER — HOSPITAL ENCOUNTER (INPATIENT)
Facility: HOSPITAL | Age: 59
LOS: 9 days | Discharge: NURSING, REHAB OR LONG-TERM CARE | DRG: 871 | End: 2019-09-25
Attending: INTERNAL MEDICINE | Admitting: INTERNAL MEDICINE
Payer: MEDICARE

## 2019-09-16 ENCOUNTER — TRANSITIONAL CARE MANAGEMENT (OUTPATIENT)
Dept: FAMILY MEDICINE CLINIC | Facility: CLINIC | Age: 59
End: 2019-09-16

## 2019-09-16 VITALS
BODY MASS INDEX: 25.92 KG/M2 | RESPIRATION RATE: 18 BRPM | OXYGEN SATURATION: 99 % | DIASTOLIC BLOOD PRESSURE: 67 MMHG | HEART RATE: 98 BPM | SYSTOLIC BLOOD PRESSURE: 111 MMHG | TEMPERATURE: 98.6 F | WEIGHT: 151 LBS

## 2019-09-16 DIAGNOSIS — L89.323: Chronic | ICD-10-CM

## 2019-09-16 DIAGNOSIS — A41.9 SEPSIS (HCC): ICD-10-CM

## 2019-09-16 DIAGNOSIS — L02.91 ABSCESS: ICD-10-CM

## 2019-09-16 DIAGNOSIS — Z86.19 HISTORY OF CLOSTRIDIUM DIFFICILE COLITIS: ICD-10-CM

## 2019-09-16 DIAGNOSIS — D64.9 ANEMIA: ICD-10-CM

## 2019-09-16 DIAGNOSIS — L89.154 STAGE IV PRESSURE ULCER OF SACRAL REGION (HCC): Primary | Chronic | ICD-10-CM

## 2019-09-16 DIAGNOSIS — T83.61XD: ICD-10-CM

## 2019-09-16 DIAGNOSIS — T83.61XD INFECTION AND INFLAMMATORY REACTION DUE TO IMPLANTED PENILE PROSTHESIS, SUBSEQUENT ENCOUNTER: ICD-10-CM

## 2019-09-16 DIAGNOSIS — N31.9 NEUROGENIC BLADDER: ICD-10-CM

## 2019-09-16 DIAGNOSIS — G82.20 PARAPLEGIC SPINAL PARALYSIS (HCC): ICD-10-CM

## 2019-09-16 DIAGNOSIS — N39.0 UTI (URINARY TRACT INFECTION): ICD-10-CM

## 2019-09-16 PROBLEM — R78.81 BACTEREMIA: Status: ACTIVE | Noted: 2019-09-16

## 2019-09-16 PROBLEM — T83.61XA INFECTED PENILE IMPLANT (HCC): Status: ACTIVE | Noted: 2019-09-16

## 2019-09-16 LAB
ANION GAP SERPL CALCULATED.3IONS-SCNC: 6 MMOL/L (ref 5–14)
BASOPHILS # BLD AUTO: 0.1 THOUSANDS/ΜL (ref 0–0.1)
BASOPHILS NFR BLD AUTO: 1 % (ref 0–1)
BUN SERPL-MCNC: 8 MG/DL (ref 5–25)
CALCIUM SERPL-MCNC: 8.4 MG/DL (ref 8.4–10.2)
CHLORIDE SERPL-SCNC: 100 MMOL/L (ref 97–108)
CO2 SERPL-SCNC: 28 MMOL/L (ref 22–30)
CREAT SERPL-MCNC: 0.42 MG/DL (ref 0.7–1.5)
EOSINOPHIL # BLD AUTO: 0.1 THOUSAND/ΜL (ref 0–0.4)
EOSINOPHIL NFR BLD AUTO: 1 % (ref 0–6)
ERYTHROCYTE [DISTWIDTH] IN BLOOD BY AUTOMATED COUNT: 16.8 %
GFR SERPL CREATININE-BSD FRML MDRD: 128 ML/MIN/1.73SQ M
GLUCOSE SERPL-MCNC: 91 MG/DL (ref 70–99)
HCT VFR BLD AUTO: 24.8 % (ref 41–53)
HGB BLD-MCNC: 7.9 G/DL (ref 13.5–17.5)
LYMPHOCYTES # BLD AUTO: 1.4 THOUSANDS/ΜL (ref 0.5–4)
LYMPHOCYTES NFR BLD AUTO: 15 % (ref 25–45)
MCH RBC QN AUTO: 23.9 PG (ref 26–34)
MCHC RBC AUTO-ENTMCNC: 31.7 G/DL (ref 31–36)
MCV RBC AUTO: 75 FL (ref 80–100)
MONOCYTES # BLD AUTO: 1 THOUSAND/ΜL (ref 0.2–0.9)
MONOCYTES NFR BLD AUTO: 11 % (ref 1–10)
NEUTROPHILS # BLD AUTO: 6.9 THOUSANDS/ΜL (ref 1.8–7.8)
NEUTS SEG NFR BLD AUTO: 73 % (ref 45–65)
PLATELET # BLD AUTO: 419 THOUSANDS/UL (ref 150–450)
PMV BLD AUTO: 7.9 FL (ref 8.9–12.7)
POTASSIUM SERPL-SCNC: 3.5 MMOL/L (ref 3.6–5)
RBC # BLD AUTO: 3.3 MILLION/UL (ref 4.5–5.9)
SODIUM SERPL-SCNC: 134 MMOL/L (ref 137–147)
WBC # BLD AUTO: 9.4 THOUSAND/UL (ref 4.5–11)

## 2019-09-16 PROCEDURE — 99238 HOSP IP/OBS DSCHRG MGMT 30/<: CPT | Performed by: FAMILY MEDICINE

## 2019-09-16 PROCEDURE — 99223 1ST HOSP IP/OBS HIGH 75: CPT | Performed by: INTERNAL MEDICINE

## 2019-09-16 PROCEDURE — 80048 BASIC METABOLIC PNL TOTAL CA: CPT | Performed by: FAMILY MEDICINE

## 2019-09-16 PROCEDURE — 87040 BLOOD CULTURE FOR BACTERIA: CPT | Performed by: INTERNAL MEDICINE

## 2019-09-16 PROCEDURE — 99222 1ST HOSP IP/OBS MODERATE 55: CPT | Performed by: UROLOGY

## 2019-09-16 PROCEDURE — 85025 COMPLETE CBC W/AUTO DIFF WBC: CPT | Performed by: FAMILY MEDICINE

## 2019-09-16 RX ORDER — CEFTRIAXONE 1 G/50ML
1000 INJECTION, SOLUTION INTRAVENOUS EVERY 24 HOURS
Status: DISCONTINUED | OUTPATIENT
Start: 2019-09-16 | End: 2019-09-16 | Stop reason: HOSPADM

## 2019-09-16 RX ORDER — ASCORBIC ACID 500 MG
500 TABLET ORAL
Status: CANCELLED | OUTPATIENT
Start: 2019-09-17

## 2019-09-16 RX ORDER — ASCORBIC ACID 500 MG
500 TABLET ORAL
Status: DISCONTINUED | OUTPATIENT
Start: 2019-09-17 | End: 2019-09-25 | Stop reason: HOSPADM

## 2019-09-16 RX ORDER — FOLIC ACID 1 MG/1
500 TABLET ORAL DAILY
Status: DISCONTINUED | OUTPATIENT
Start: 2019-09-17 | End: 2019-09-25 | Stop reason: HOSPADM

## 2019-09-16 RX ORDER — FERROUS SULFATE 325(65) MG
325 TABLET ORAL
Status: DISCONTINUED | OUTPATIENT
Start: 2019-09-17 | End: 2019-09-21

## 2019-09-16 RX ORDER — SACCHAROMYCES BOULARDII 250 MG
250 CAPSULE ORAL 2 TIMES DAILY
Status: CANCELLED | OUTPATIENT
Start: 2019-09-16

## 2019-09-16 RX ORDER — ASPIRIN 81 MG/1
81 TABLET ORAL DAILY
Status: CANCELLED | OUTPATIENT
Start: 2019-09-17

## 2019-09-16 RX ORDER — OXYCODONE HYDROCHLORIDE 10 MG/1
20 TABLET ORAL EVERY 8 HOURS PRN
Status: DISCONTINUED | OUTPATIENT
Start: 2019-09-16 | End: 2019-09-25 | Stop reason: HOSPADM

## 2019-09-16 RX ORDER — NITROFURANTOIN 25; 75 MG/1; MG/1
100 CAPSULE ORAL 2 TIMES DAILY WITH MEALS
Status: DISCONTINUED | OUTPATIENT
Start: 2019-09-16 | End: 2019-09-18

## 2019-09-16 RX ORDER — ASPIRIN 81 MG/1
81 TABLET ORAL DAILY
Status: DISCONTINUED | OUTPATIENT
Start: 2019-09-17 | End: 2019-09-25 | Stop reason: HOSPADM

## 2019-09-16 RX ORDER — SACCHAROMYCES BOULARDII 250 MG
250 CAPSULE ORAL 2 TIMES DAILY
Status: DISCONTINUED | OUTPATIENT
Start: 2019-09-16 | End: 2019-09-25 | Stop reason: HOSPADM

## 2019-09-16 RX ORDER — FOLIC ACID 1 MG/1
500 TABLET ORAL DAILY
Status: CANCELLED | OUTPATIENT
Start: 2019-09-17

## 2019-09-16 RX ORDER — OXYCODONE HYDROCHLORIDE 5 MG/1
20 TABLET ORAL EVERY 8 HOURS PRN
Status: CANCELLED | OUTPATIENT
Start: 2019-09-16

## 2019-09-16 RX ORDER — FERROUS SULFATE 325(65) MG
325 TABLET ORAL
Status: CANCELLED | OUTPATIENT
Start: 2019-09-17

## 2019-09-16 RX ORDER — POTASSIUM CHLORIDE 20 MEQ/1
20 TABLET, EXTENDED RELEASE ORAL ONCE
Status: COMPLETED | OUTPATIENT
Start: 2019-09-16 | End: 2019-09-16

## 2019-09-16 RX ORDER — CEFTRIAXONE 1 G/50ML
1000 INJECTION, SOLUTION INTRAVENOUS EVERY 24 HOURS
Status: CANCELLED | OUTPATIENT
Start: 2019-09-16

## 2019-09-16 RX ADMIN — Medication 125 MG: at 22:10

## 2019-09-16 RX ADMIN — ASPIRIN 81 MG: 81 TABLET ORAL at 08:29

## 2019-09-16 RX ADMIN — ENOXAPARIN SODIUM 40 MG: 40 INJECTION SUBCUTANEOUS at 08:31

## 2019-09-16 RX ADMIN — OXYCODONE HYDROCHLORIDE 20 MG: 5 TABLET ORAL at 01:25

## 2019-09-16 RX ADMIN — Medication 250 MG: at 08:29

## 2019-09-16 RX ADMIN — Medication 250 MG: at 17:48

## 2019-09-16 RX ADMIN — FERROUS SULFATE TAB 325 MG (65 MG ELEMENTAL FE) 325 MG: 325 (65 FE) TAB at 08:29

## 2019-09-16 RX ADMIN — NITROFURANTOIN (MONOHYDRATE/MACROCRYSTALS) 100 MG: 75; 25 CAPSULE ORAL at 17:48

## 2019-09-16 RX ADMIN — ALTEPLASE 2 MG: 2.2 INJECTION, POWDER, LYOPHILIZED, FOR SOLUTION INTRAVENOUS at 18:07

## 2019-09-16 RX ADMIN — CEFEPIME HYDROCHLORIDE 2000 MG: 2 INJECTION, SOLUTION INTRAVENOUS at 08:35

## 2019-09-16 RX ADMIN — POTASSIUM CHLORIDE 20 MEQ: 20 TABLET, EXTENDED RELEASE ORAL at 08:30

## 2019-09-16 RX ADMIN — OXYCODONE HYDROCHLORIDE AND ACETAMINOPHEN 500 MG: 500 TABLET ORAL at 08:30

## 2019-09-16 RX ADMIN — OXYCODONE HYDROCHLORIDE 20 MG: 10 TABLET ORAL at 17:53

## 2019-09-16 RX ADMIN — OXYCODONE HYDROCHLORIDE 20 MG: 5 TABLET ORAL at 09:36

## 2019-09-16 RX ADMIN — FOLIC ACID 500 MCG: 1 TABLET ORAL at 08:29

## 2019-09-16 NOTE — ASSESSMENT & PLAN NOTE
Patient has a nonfunctional penile prosthesis  Possible infection of the penile prosthesis  Evaluated by Urology on the previous admission 08/23/2019, it was recommended for it to be removed, patient refused, wanted to do it as outpatient  CT abd 9/14/19 "Small amount of fluid within the left perirectal/mesorectal space, likely residual to prior infection, without significant evidence of a deep pelvic and perineal soft tissue infection as seen on prior exam of 8/23/2019  Residual focus of air seen along   the right inferior pubic rami, which may be ulcer related"  Urology consult placed  Evaluated by Infectious Disease  Continue current antibiotics

## 2019-09-16 NOTE — ASSESSMENT & PLAN NOTE
Patient with a history of C diff    Per ID started on oral vancomycin prophylaxis  Monitor ostomy output

## 2019-09-16 NOTE — CONSULTS
Consult Note - Wound   Maribell Anali Moscat 63 y o  male MRN: [id number]  Unit/Bed#: E2 -01 Encounter: [id number]      History and Present Illness:  60-year-old male with an extensive past medical history of right-sided amputation, paraplegia, neurogenic bladder, blindness of the right eye, status post colostomy, sacral ulcer with chronic osteomyelitis, status post penile prosthesis implant with chronic infection who presented to the hospital with fever and inability to self catheterize  Assessment Findings:   Patient seen per physician consult  Denies pain  Able to turn independently in bed for assessment  Solomon catheter in place  Scar tissue to midline abdomen, left medial malleolus, left heel, left knee, left posterior thigh, and bilateral buttocks  Urethral erosion with cloudy yellow drainage via urethra noted--urology following  Colostomy one piece pouch coming off on assessment, soft brown stool and flatus noted in pouch--pouching system changed using 2 piece 2 3/4 Convatec pouching system per patient request   Stoma is pink, round, well budded  Shelby-stomal skin and mucocutaneous junction is intact  1   Present on admission partial thickness wound to left mid lateral buttock (pressure injury vs MARSI)  2   Present on admission stage 2 pressure injury to right distal buttock  3   Present on admission MASD with scattered partial thickness open areas to anterior and posterior scrotum  4   Present on admission stage 4 pressure injury to left distal buttock--wound bed pink, glossy, smooth  Probes to bone  Tunneling present at 12 and 4 o'clock  No odor appreciated  No evidence of acute wound infection  See flowsheet for wound details  Patient follows at the [facility] wound center  Plan:   1-Prevalon boot to left heel  2-Hydraguard lotion to left heel and scrotum BID and PRN  3-EHOB offloading cushion in chair when out of bed    4-Moisturize skin daily with skin nourishing cream   5-Turn/reposition q2h or when medically stable for pressure re-distribution on skin--use positioning wedges  6-P500 low air-loss mattress  7-Left distal buttock stage 4--cleanse with normal saline  3m cavilon no sting skin barrier film to gaby-wound skin  Pack wound with 2 inch jagjit coated in Silvasorb gel (ensure tunnels at 12 and 4 o'clock are loosely packed)  Cover with ABD  Change dressing daily and PRN with soilage  8-Left lateral buttock and right distal buttock wounds--cleanse soap and water, pat dry  Apply Allevyn Life foam dressings (large sacral to left covering open area and scar tissue & small shamrock to right distal buttock wound)  Lupillo with T   Change dressing every other day and PRN  9-Ostomy care--empty pouch when 1/3 full  Check pouch every 2-4 hours for gas, release as needed  Use 2 piece 2 3/4 (blue label) pouching system  10-Wound care team will follow      Plan of care reviewed with primary RN      Patient should be discharged with [facility] for wound care and follow-up at wound center  Wound 08/26/19 Pressure Injury Buttocks Left;Mid;Lateral (Active)   Wound Image   9/16/2019  5:07 PM   Wound Description Pink;Epithelialization 9/16/2019  5:07 PM   Staging Stage II 9/16/2019  5:07 PM   Gaby-wound Assessment Fragile;Erythema 9/16/2019  5:07 PM   Wound Length (cm) 0 5 cm 9/16/2019  5:07 PM   Wound Width (cm) 1 cm 9/16/2019  5:07 PM   Wound Depth (cm) 0 1 9/16/2019  5:07 PM   Calculated Wound Area (cm^2) 0 5 cm^2 9/16/2019  5:07 PM   Calculated Wound Volume (cm^3) 0 05 cm^3 9/16/2019  5:07 PM   Change in Wound Size % -400 9/16/2019  5:07 PM   Tunneling 0 cm 9/16/2019  5:07 PM   Undermining 0 9/16/2019  5:07 PM   Drainage Amount None 9/16/2019  5:07 PM   Non-staged Wound Description Partial thickness 9/16/2019  5:07 PM   Treatments Cleansed 9/16/2019  5:07 PM   Dressing Foam, Silicon (eg   Allevyn, etc) 9/16/2019  5:07 PM   Dressing Changed New 9/16/2019  5:07 PM   Patient Tolerance Tolerated well 9/16/2019  5:07 PM   Dressing Status Dry; Intact; Clean 9/16/2019  5:07 PM       Wound 08/26/19 Pressure Injury Buttocks Left;Distal;Lateral (Active)   Wound Image   9/16/2019  4:55 PM   Wound Description Pink;Pale 9/16/2019  4:55 PM   Staging Stage IV 9/16/2019  4:55 PM   Shelby-wound Assessment Erythema 9/16/2019  4:55 PM   Wound Length (cm) 7 6 cm 9/16/2019  4:55 PM   Wound Width (cm) 3 7 cm 9/16/2019  4:55 PM   Wound Depth (cm) 3 1 9/16/2019  4:55 PM   Calculated Wound Area (cm^2) 28 12 cm^2 9/16/2019  4:55 PM   Calculated Wound Volume (cm^3) 87 17 cm^3 9/16/2019  4:55 PM   Change in Wound Size % -[id number] 5 9/16/2019  4:55 PM   Tunneling 0 cm 9/16/2019  4:55 PM   Undermining 0 9/16/2019  4:55 PM   Undermining is depth extending from 12:00 9/15/2019 12:33 AM   Drainage Amount Moderate 9/16/2019  4:55 PM   Drainage Description Serosanguineous; Yellow 9/16/2019  4:55 PM   Non-staged Wound Description Full thickness 9/16/2019  4:55 PM   Treatments Cleansed;Irrigation with NSS 9/16/2019  4:55 PM   Dressing Packings;Silvasorb gel;Dry dressing 9/16/2019  4:55 PM   Wound packed? Yes 9/16/2019  4:55 PM   Packing- # removed 3 9/16/2019  4:55 PM   Packing- # inserted 1 9/16/2019  4:55 PM   Dressing Changed Changed 9/16/2019  4:55 PM   Patient Tolerance Tolerated well 9/16/2019  4:55 PM   Dressing Status Clean;Dry; Intact 9/16/2019  4:55 PM       Wound 09/16/19 Buttocks Right;Distal (Active)   Wound Image   9/16/2019  4:58 PM   Wound Description Beefy red 9/16/2019  4:58 PM   Staging Stage II 9/16/2019  4:58 PM   Shelby-wound Assessment Erythema; Intact 9/16/2019  4:58 PM   Wound Length (cm) 3 cm 9/16/2019  4:58 PM   Wound Width (cm) 1 cm 9/16/2019  4:58 PM   Wound Depth (cm) 0 1 9/16/2019  4:58 PM   Calculated Wound Area (cm^2) 3 cm^2 9/16/2019  4:58 PM   Calculated Wound Volume (cm^3) 0 3 cm^3 9/16/2019  4:58 PM   Tunneling 0 cm 9/16/2019  4:58 PM   Undermining 0 9/16/2019  4:58 PM   Drainage Amount Scant 9/16/2019  4:58 PM   Drainage Description Serosanguineous 9/16/2019  4:58 PM   Non-staged Wound Description Partial thickness 9/16/2019  4:58 PM   Treatments Cleansed 9/16/2019  4:58 PM   Dressing Foam, Silicon (eg  Allevyn, etc) 9/16/2019  4:58 PM   Dressing Changed New 9/16/2019  4:58 PM   Patient Tolerance Tolerated well 9/16/2019  4:58 PM   Dressing Status Clean;Dry; Intact 9/16/2019  4:58 PM       Wound 09/16/19 Moisture associated skin damage Scrotum Posterior (Active)   Wound Image   9/16/2019  4:59 PM   Wound Description [place] 9/16/2019  4:59 PM   Shelby-wound Assessment Erythema 9/16/2019  4:59 PM   Wound Length (cm) 4 7 cm 9/16/2019  4:59 PM   Wound Width (cm) 2 cm 9/16/2019  4:59 PM   Wound Depth (cm) 0 1 9/16/2019  4:59 PM   Calculated Wound Area (cm^2) 9 4 cm^2 9/16/2019  4:59 PM   Calculated Wound Volume (cm^3) 0 94 cm^3 9/16/2019  4:59 PM   Tunneling 0 cm 9/16/2019  4:59 PM   Undermining 0 9/16/2019  4:59 PM   Drainage Amount Scant 9/16/2019  4:59 PM   Drainage Description Serous 9/16/2019  4:59 PM   Non-staged Wound Description Partial thickness 9/16/2019  4:59 PM   Treatments Cleansed 9/16/2019  4:59 PM   Dressing Moisture barrier 9/16/2019  4:59 PM   Patient Tolerance Tolerated well 9/16/2019  4:59 PM       Wound 09/16/19 Moisture associated skin damage Scrotum Anterior (Active)   Wound Image   9/16/2019  5:15 PM   Wound Description Pink 9/16/2019  5:15 PM   Shelby-wound Assessment Erythema 9/16/2019  5:15 PM   Wound Length (cm) 4 cm 9/16/2019  5:15 PM   Wound Width (cm) 8 5 cm 9/16/2019  5:15 PM   Wound Depth (cm) 0 1 9/16/2019  5:15 PM   Calculated Wound Area (cm^2) 34 cm^2 9/16/2019  5:15 PM   Calculated Wound Volume (cm^3) 3 4 cm^3 9/16/2019  5:15 PM   Tunneling 0 cm 9/16/2019  5:15 PM   Undermining 0 9/16/2019  5:15 PM   Drainage Amount Scant 9/16/2019  5:15 PM   Drainage Description Serous 9/16/2019  5:15 PM   Non-staged Wound Description Partial thickness 9/16/2019  5:15 PM   Treatments Cleansed 9/16/2019  5:15 PM   Dressing Moisture barrier 9/16/2019  5:15 PM   Patient Tolerance Tolerated well 9/16/2019  5:15 PM     Lucas CLINTONN, RN, [employer]

## 2019-09-16 NOTE — ASSESSMENT & PLAN NOTE
Eventually this prosthesis will need to be removed  However at this point, on imaging and exam it appears not to be grossly infected but rather to be communication from the urinary tract from obliterated urethra with loss of domain communicating with this decubitus ulcer  For now, will divert urine via suprapubic catheter and follow clinical course  Plan for outpatient follow-up (scheduled 10/8)  with Dr Ramin Dwyer for discussion of explant at that time  No inpatient surgical plans at this time

## 2019-09-16 NOTE — CONSULTS
Consultation - Infectious Disease   Cynthia Sauer Moscat 63 y o  male MRN: [id number]  Unit/Bed#: 5T -01 Encounter: [id number]      IMPRESSION & RECOMMENDATIONS:   1  Sepsis, POA  Patient presented on admission with high fever along with leukocytosis  This is likely due to problem 2 and issues with self catheterization  Patient clinically improving  Cultures pending  CT imaging without acute findings  Continue antibiotics as below  Continue to trend fever curve/vitals  Repeat CBC/chemistry tomorrow  Repeat blood cultures today  Additional care as per primary    2  Complicated urinary tract infection  Patient likely developed lower urinary tract infection due to recent issues with self catheterization and likely retention  No acute changes noted on CT of the abdomen  This is unfortunately a 2nd episode for the patient  Prior cultures reviewed  Will narrow patient is ceftriaxone  Continue to trend fever curve/vitals  Repeat labs tomorrow  Urology evaluation pending  Additional care as per primary  Would consider maintenance of Solomon catheter chronically    3  Gram-positive bacteremia  One of 2 blood culture sets noted to be positive with gram-positive cocci in clusters  Patient reports that there was difficulty in obtaining blood cultures on admission  He otherwise remains hemodynamically stable  Continue antibiotics as above  Will repeat blood cultures today  Will follow up pending culture data  Will interventions pending clinical course    4  Chronic sacral ulcers and penile implant  Patient noted with chronic sacral ulcerations which appear stable  He continues with local wound care daily  He also has a nonfunctional penile implant in place  Patient was recommended for non emergent removal of this device as an outpatient  Would recommend wound care consult while admitted  Follow-up with Urology as outpatient  Additional care as per primary    5  Paraplegia  6  History of C diff    Patient noted with prior history of C diff which is not documented in our system  He denies having any increased to his ostomy output  Start on oral vancomycin prophylaxis  Monitor ostomy output  Serial abdominal exam  Additional care as per primary    Above plan discussed in detail with the patient and with primary service resident  ID consult service will continue to follow  HISTORY OF PRESENT ILLNESS:  Reason for Consult:  Urinary tract infection, penile prosthesis    HPI: Lino Oquendo is a 63y o  year old male with past medical history complicated by paraplegia, chronic colostomy, poorly controlled diabetes, history of osteomyelitis and leg amputation as well as erectile dysfunction with penile implant and chronic stage IV ulcer in the sacrum with chronic osteomyelitis  The patient is known to me from his last admission at [facility] on 08/24  At that time he was admitted for difficulty with straight catheterization which led to progressive retention along with high fever  Patient was started on broad-spectrum antibiotics  There is a question of possible complicating infection in the penile prosthesis  Patient at that time was evaluated by both Urology and surgery and was not felt to have a necrotizing infection at this site  He was primarily treated for urinary tract infection due to obstruction  He was ultimately transition to oral Keflex and plan for 14 day course which he would have completed on 09/04  The patient was ultimately recommended for penile implant removal as an outpatient  Cultures were sent and the patient was continued on cefepime  No other acute events were noted overnight on chart review  Patient remains afebrile and without leukocytosis  Urine cultures with E coli  Blood cultures with 1 of 2 sets with gram-positive cocci in clusters  CT of the abdomen without acute signs of infection compared to prior image from 8/23  Patient's other vitals are stable    Patient's other labs are unremarkable  Prior urine culture reviewed  We are consulted at this time for further assistance in management given this patient's presentation of sepsis on admission and concern for underlying urinary pathology  On evaluation, the patient is very pleasant  He reports that after his discharge he was seen by Urology as an outpatient and was plan for elective removal of his penile implant after his urologist returns from vacation  The patient reports that he was recommended for elective removal and non urgent surgery  He reports that when he was discharged from [facility] he was sent home with a catheter however he had that catheter removed on 09/06  He reports that on 09/14 he attempted to straight catheterize himself at home and was not able to access his bladder for drainage and was reaching resistance  After multiple attempts he began to develop fever and he presented to the emergency department for further evaluation  He reports that he continues with daily wound changes with visiting nurse and there have been no acute issues at his wound site  He is hopeful that he will not have to maintain catheter however he is aware that this is the 2nd time that this has happened  He has a no other new pain or symptoms to report  He has no other new issues at this time  REVIEW OF SYSTEMS:  A complete 12 point system-based review of systems is negative other than that noted in the HPI      PAST MEDICAL HISTORY:  Past Medical History:   Diagnosis Date    Anemia     Blind     r eye    Chronic cystitis     Col[facility])     Detrusor sphincter dyssynergia     Diabetes mellitus ([facility] [address] )     Poorly controlled type 2; Last Assessed:  3/18/14    Erectile dysfunction     Frequency of urination     GERD (gastroesophageal reflux disease)     History of diabetes mellitus     History of osteomyelitis     Hx of leg amputation (HCC)     r high upper leg    Hyperlipidemia     Hypertension     Hypospadias     Incomplete bladder emptying     Neurogenic bladder     Paralysis (HCC)     Paraplegia (HCC)     Spinal cord cysts     Ulcer of sacral region [facility])     Urge incontinence      Past Surgical History:   Procedure Laterality Date    AMPUTATION      At hip; Last Assessed:  16    BLADDER SURGERY      COLON SURGERY      llq ostomy pouch    COLOSTOMY      COMPLEX CYSTOMETROGRAM  2014    CYSTOSCOPY      LEG AMPUTATION      MEATOTOMY      PENILE PROSTHESIS IMPLANT      AR ADJ TISS XFER SCALP,EXTREM 10 1-30 SQCM Left 2017    Procedure: POSTERIOR THIGH V-Y ADMANCEMENT;  Surgeon: Wilfrid Thomson MD;  [facility];  Service: Plastics    AR MUSCLE-SKIN FLAP,TRUNK Left 2017    Procedure: FLAP CLOSURE LEFT ISCHIAL WOUND and "RIGHT" ISCHIAL FLAP ADVANCEMENT * DEBRIDEMENT, VAC PLACEMENT ;  Surgeon: Wilfrid Thomson MD;  [facility]: [facility];  Service: Plastics    AR MUSCLE-SKIN FLAP,TRUNK Left 2017    Procedure: gluteal myocutaneous rotational flap, posterior thigh v to y advancement- wound 5 x 2 5 x 8;  Surgeon: Wilfrid Thomson MD;  [facility];  Service: Plastics    SPINE SURGERY      Lower back    UROFLOWMETRY SIMPLE / COMPLEX         FAMILY HISTORY:  Non-contributory    SOCIAL HISTORY:  Social History   Social History     Substance and Sexual Activity   Alcohol Use Never    Frequency: Never    Comment: Per [employer]:  Social drinker (Onset date:  11/10/17)     Social History     Substance and Sexual Activity   Drug Use No     Social History     Tobacco Use   Smoking Status Former Smoker    Packs/day: 0 50    Years: 10 00    Pack years: 5 00    Last attempt to quit: Lucas Malave Years since quittin 7   Smokeless Tobacco Never Used   Tobacco Comment    Onset date:  11/10/17       ALLERGIES:  No Known Allergies    MEDICATIONS:  All current active medications have been reviewed    PHYSICAL EXAM:  Temp:  [98 6 °F (37 °C)-100 °F (37 8 °C)] 98 6 °F (37 °C)  HR: [] 98  Resp:  [18-20] 18  BP: ()/(56-80) 111/67  SpO2:  [99 %] 99 %  Temp (24hrs), Av 2 °F (37 3 °C), Min:98 6 °F (37 °C), Max:100 °F (37 8 °C)  Current: Temperature: 98 6 °F (37 °C)    Intake/Output Summary (Last 24 hours) at 2019 0842  Last data filed at 2019 7329  Gross per 24 hour   Intake 2190 42 ml   Output 1650 ml   Net 540 42 ml       General Appearance:  Appearing well, nontoxic, and in no distress; patient is  bed-bound but is pleasant and able to provide history   Head:  Normocephalic, without obvious abnormality, atraumatic   Eyes:  Conjunctiva pink and sclera anicteric, both eyes   Nose: Nares normal, mucosa normal, no drainage   Throat: Oropharynx moist without lesions; poor dentition noted   Neck: Supple, symmetrical, no adenopathy, no tenderness/mass/nodules   Back:   Symmetric, no curvature, ROM normal, no CVA tenderness; no spinal or paraspinal muscle tenderness to palpation  Patient's sacral wound evaluated and it is currently packed at this time and the dressings are soaked with some urine  Patient also seems to have some urinary leakage around his Solomon catheter  Lungs:   Clear to auscultation bilaterally, respirations unlabored   Chest Wall:  No tenderness or deformity   Heart:  RRR; no murmur, rub or gallop   Abdomen:   Soft, non-tender, non-distended, positive bowel sounds; no suprapubic discomfort on exam    Extremities: No cyanosis, clubbing or edema at the stump sites   Skin: No rashes or lesions otherwise  No draining wounds noted otherwise  Lymph nodes: Cervical, supraclavicular nodes normal   Neurologic: Alert and oriented times 3, patient has free range of motion of his upper extremities and is able to maneuver himself in bed  LABS, IMAGING, & OTHER STUDIES:  Lab Results:  I have personally reviewed pertinent labs    Results from last 7 days   Lab Units 19  0458 09/15/19  0505 19  2201   WBC Thousand/uL 9 40 10 30 13 90*   HEMOGLOBIN g/dL 7 9* 8 3* 8 7*   PLATELETS Thousands/uL 419 400 436     Results from last 7 days   Lab Units 09/16/19  0458  09/14/19  2201   POTASSIUM mmol/L 3 5*   < > 3 3*   CHLORIDE mmol/L 100   < > 98   CO2 mmol/L 28   < > 27   BUN mg/dL 8   < > 18   CREATININE mg/dL 0 42*   < > 0 50*   EGFR ml/min/1 73sq m 128   < > 119   CALCIUM mg/dL 8 4   < > 8 4   AST U/L  --   --  19   ALT U/L  --   --  8*   ALK PHOS U/L  --   --  95    < > = values in this interval not displayed  Results from last 7 days   Lab Units 09/14/19  2202 09/13/19  1107   GRAM STAIN RESULT  Gram positive cocci in clusters*  --    URINE CULTURE   --  >100,000 cfu/ml Escherichia coli*       Imaging Studies:   I have personally reviewed pertinent imaging study reports and images in PACS  Other Studies:   I have personally reviewed pertinent reports

## 2019-09-16 NOTE — H&P
H&P- Jesus Morales Moscat 1960, 63 y o  male MRN: [id number]    Unit/Bed#: E2 -01 Encounter: [id number]    Primary Care Provider: Amando Beltran MD   Date and time admitted to hospital: 9/16/2019 11:44 AM        * Complicated urinary tract infection  Assessment & Plan  Possible urinary tract infection secondary to neurogenic bladder/urinary retention/self catheterizations   Urine culture from 09/13/2019 growing E coli ESBL  Repeat urine culture from 09/14/2019 showed mixed contaminants  Evaluated by Infectious Disease, appreciate recommendations  Patient was transferred from [facility] on Ceftriaxone and Vanco   ID changed ceftriaxone to Macrobid today  Patient has a Solomon, urine is clean      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[facility])  Assessment & Plan  Patient has a nonfunctional penile prosthesis  Possible infection of the penile prosthesis  Evaluated by Urology on the previous admission 08/23/2019, it was recommended for it to be removed, patient refused, wanted to do it as outpatient  CT abd 9/14/19 "Small amount of fluid within the left perirectal/mesorectal space, likely residual to prior infection, without significant evidence of a deep pelvic and perineal soft tissue infection as seen on prior exam of 8/23/2019  Residual focus of air seen along   the right inferior pubic rami, which may be ulcer related"  Urology consult placed  Evaluated by Infectious Disease  Continue current antibiotics  Sepsis [facility])  Assessment & Plan  09/14/2019 patient was admitted to [facility] for sepsis with elevated white blood cell count, temperature of 102F  Received fluid resuscitation and antibiotics  Continue antibiotics per ID  Patient is afebrile with white blood cell count 13 9-->9 4  Monitor vitals    Patient was admitted 08/23/2019 with sepsis secondary to complicated UTI  Urine culture positive for E coli ESBL there was a concern for Fourniere's gangrene and infection of the penile implant  Gangrene was ruled out  Patient was recommended to have penile implant removed which he refused  Bacteremia  Assessment & Plan  One set of blood cultures taken 09/14/2019 growing GPC in clusters  Repeat blood cultures today    Paraplegic spinal paralysis [facility])  Assessment & Plan  Secondary to spinal cyst  Frequent repositioning      History of Clostridium difficile colitis  Assessment & Plan  Patient with a history of C diff  Per ID started on oral vancomycin prophylaxis  Monitor ostomy output    Stage IV pressure ulcer of sacral region [facility])  Assessment & Plan  Wound care consult placed    Colostomy in place [facility])  Assessment & Plan  Secondary to chronic sacral ulcers    Neurogenic bladder  Assessment & Plan  Solomon in place    Opioid use  Assessment & Plan  PDMP reviewed  Continue Oxy 20 mg q 8h p r n  Diabetes mellitus type II, controlled [facility])  Assessment & Plan  Lab Results   Component Value Date    HGBA1C 5 2 05/06/2019       Recent Labs     09/15/19  0114 09/15/19  0603 09/15/19  1132   POCGLU 157* 97 95       Blood Sugar Average: Last 72 hrs:   diet controlled  Last hemoglobin A1c 05/06/2019 was 5 2  Continue to monitor    Anemia  Assessment & Plan  Baseline around 8 5-9  Hemoglobin today 7 9  No signs of active bleed  Continue to monitor, transfuse if less than 7  Continue ferrous sulfate and vitamin-C      VTE Prophylaxis: Enoxaparin (Lovenox)  / sequential compression device   Code Status: Full code  POLST: There is no POLST form on file for this patient (pre-hospital)  Discussion with family: no family at bedside     Anticipated Length of Stay:  Patient will be admitted on an Inpatient basis with an anticipated length of stay of   2 midnights  Justification for Hospital Stay: possible need for penile implant removal, on IV antibiotics     Total Time for Visit, including Counseling / Coordination of Care: 45 minutes    Greater than 50% of this total time spent on direct patient counseling and coordination of care      Chief Complaint:   Patient unable to do self catheterization     History of Present Illness:     Kelly Warner is a 63 y o  male who presents as a transfer from [facility] for urology evaluation  Patient is a 41-year-old male with a past medical history of paraplegia secondary to spinal cysts, chronic sacral ulcer, status post colostomy due to sacral ulcer, neurogenic bladder, history of  osteoyelitis with right above the knee amputation, history of ED s/p penile implant who presented to [facility] because he was unable to self cath, started feeling chills 2 days prior to presentation  He was diagnosed with sepsis due to complicated UTI versus penile implant infection, received fluid resuscitation and IV antibiotics, evaluated by ID  Due to possible penile implant infection he was transferred to [facility] for urology evaluation  Patient is currently afebrile, his white blood cell count improved 13 9-->9 4  Patient has a Solomon  Urine culture from 09/13/2019 growing E coli ESBL  Blood cultures from 09/14/2019 1 set growing GPC in clusters  Transfered on ceftriaxone and vancomycin per ID  Patient was admitted 08/23/2019 for sepsis  He was unable to self cath at that time too  There was a possibility of Vega gangrene on the CT  He was evaluated by Urology and surgery and Vega gangrene was ruled out  He was treated for complicated urinary tract infection  He received 14 days of antibiotics, last dose 09/04/2019  Patient was recommended penile implant to be removed, he refused it on that admission  He wanted to do it as outpatient  Sent home with a Solomon which was removed 09/06/2019  He was seen by Urology as outpatient, he was supposed to have an elective removal after his urologist is back from vacation        Review of Systems:     Review of Systems   Constitutional: Negative for activity change, appetite change, chills, fatigue and fever  HENT: Negative for congestion, ear pain, facial swelling, hearing loss, rhinorrhea, sinus pain and sore throat  Eyes: Negative for pain, discharge and redness  Hx of right vision loss   Respiratory: Negative for apnea, cough, chest tightness, shortness of breath and wheezing  Cardiovascular: Negative for chest pain, palpitations and leg swelling  Gastrointestinal: Negative for abdominal distention, abdominal pain, blood in stool, diarrhea, nausea and vomiting  Colostomy   Genitourinary:        Self cathing   Musculoskeletal: Negative for arthralgias and back pain  Skin: Negative for pallor and rash  Sacral wound   Neurological: Negative for dizziness, seizures, syncope, facial asymmetry, light-headedness, numbness and headaches     Psychiatric/Behavioral: Negative for agitation, behavioral problems and confusion          Past Medical and Surgical History:      Medical History        Past Medical History:   Diagnosis Date    Anemia      Blind       r eye    Chronic cystitis      Colostomy in place [facility])      Detrusor sphincter dyssynergia      Diabetes mellitus ([facility] [address] )       Poorly controlled type 2; Last Assessed:  3/18/14    Erectile dysfunction      Frequency of urination      GERD (gastroesophageal reflux disease)      History of diabetes mellitus      History of osteomyelitis      Hx of leg amputation (HCC)       r high upper leg    Hyperlipidemia      Hypertension      Hypospadias      Incomplete bladder emptying      Neurogenic bladder      Paralysis (HCC)      Paraplegia (HCC)      Spinal cord cysts      Ulcer of sacral region ([facility] [address] )      Urge incontinence              Surgical History         Past Surgical History:   Procedure Laterality Date    AMPUTATION         At hip; Last Assessed:  1/19/16    BLADDER SURGERY        COLON SURGERY         llq ostomy pouch    COLOSTOMY        COMPLEX CYSTOMETROGRAM   2014    CYSTOSCOPY   2014    LEG AMPUTATION      MEATOTOMY        PENILE PROSTHESIS IMPLANT   2011    FL ADJ TISS XFER SCALP,EXTREM 10 1-30 SQCM Left 5/1/2017     Procedure: POSTERIOR THIGH V-Y ADMANCEMENT;  Surgeon: Jaylene Dubois MD;  [facility]: [facility];  Service: Plastics    FL MUSCLE-SKIN FLAP,TRUNK Left 5/1/2017     Procedure: FLAP CLOSURE LEFT ISCHIAL WOUND and "RIGHT" ISCHIAL FLAP ADVANCEMENT * DEBRIDEMENT, VAC PLACEMENT ;  Surgeon: Jaylene Dubois MD;  [facility];  Service: Plastics    FL MUSCLE-SKIN FLAP,TRUNK Left 9/27/2017     Procedure: gluteal myocutaneous rotational flap, posterior thigh v to y advancement- wound 5 x 2 5 x 8;  Surgeon: Jaylene Dubois MD;  [facility];  Service: Plastics    SPINE SURGERY         Lower back    UROFLOWMETRY SIMPLE / COMPLEX   2014            Meds/Allergies:             Prior to Admission medications    Medication Sig Start Date End Date Taking?  Authorizing Provider   ACCU-CHEK FASTCLIX LANCETS MISC by Other route 2 (two) times a day Test as directed 1/18/19     Lawrence Cortes MD   ACCU-CHEK SMARTVIEW test strip TEST TWICE DAILY 7/25/19     Lawrence Cortes MD   ASPIRIN LOW DOSE 81 MG EC tablet TAKE 1 TABLET BY MOUTH EVERY DAY 7/29/19     Lawrence Cortes MD   Disposable Gloves (LATEX GLOVES LARGE) MISC Use as needed up to 3 times a day dispo 2 boxes 5/22/19     Lawrence Cortes MD   ibuprofen (MOTRIN) 800 mg tablet TAKE 1 TABLET(800 MG) BY MOUTH DAILY AS NEEDED FOR MODERATE PAIN 7/29/19     Lawrence Cortes MD   Incontinence Supply Disposable (SUNBEAM INCONTINENT UNDER PAD) MISC Use 1 per week, dispense 4 reusable underpads 5/31/19     Lawrence Cortes MD   Incontinence Supply Disposable MISC by Does not apply route 2 (two) times a day 5/22/19     Lawrence Cortes MD   Nutritional Supplements (GLUCERNA 1 0 JOHANN/CARBSTEADY) LIQD Take 1 Can by mouth 3 (three) times a day Dispense 90 cans per month 5/18/18     Lawrence Cortes MD   oxyCODONE (ROXICODONE) 20 MG TABS Take 1 tablet (20 mg total) by mouth every 8 (eight) hours as needed for moderate painMax Daily Amount: 60 mg 19     Zeina Vickers MD   SUPREP BOWEL PREP KIT 17 5-3 13-1 6 GM/177ML SOLN Take 1 kit by mouth once for 1 dose Please follow instructions as per the office  19   Nickie Vallejo MD      I have reviewed home medications with patient personally      Allergies: No Known Allergies     Social History:     Marital Status: /Civil Union   Occupation:  Unemployed  Patient Pre-hospital Living Situation: living alone with aids helping him  Patient Pre-hospital Level of Mobility: bed bound  Patient Pre-hospital Diet Restrictions: carb consistent diet  Substance Use History:   Social History           Substance and Sexual Activity   Alcohol Use Never    Frequency: Never     Comment: Per Allscripts:  Social drinker (Onset date:  11/10/17)      Social History           Tobacco Use   Smoking Status Former Smoker    Packs/day: 0 50    Years: 10 00    Pack years: 5 00    Last attempt to quit: [place] Years since quittin 7   Smokeless Tobacco Never Used   Tobacco Comment     Onset date:  11/10/17      Social History          Substance and Sexual Activity   Drug Use No         Family History:           Family History   Problem Relation Age of Onset    No Known Problems Mother      No Known Problems Father           Physical Exam:      Vitals:   Blood Pressure: (!) 86/63 (19 1500)  Pulse: 96 (19 1500)  Temperature: 100 2 °F (37 9 °C) (19 1500)  Temp Source: Tympanic (19 1500)  Respirations: 18 (19 1500)  Height: 5' 7" (170 2 cm) (19 1145)  Weight - Scale: 68 3 kg (150 lb 9 2 oz) (19 1145)  SpO2: 96 % (19 1500)     Physical Exam   Constitutional: He is oriented to person, place, and time  He appears well-nourished  Seems chronically ill but in no acute distress   HENT:   Head: Normocephalic and atraumatic  Eyes:   Right corneal defect   Neck: Normal range of motion  Neck supple  Cardiovascular: Normal rate, regular rhythm and normal heart sounds  Exam reveals no gallop and no friction rub  No murmur heard  Pulmonary/Chest: Effort normal and breath sounds normal  No respiratory distress  He has no wheezes  Abdominal: Soft  Bowel sounds are normal  He exhibits no distension and no mass  There is no tenderness  There is no guarding  Colostomy in place, patent   Genitourinary:   Genitourinary Comments: Solomon in place  Scrotum with superficial wounds   Musculoskeletal:   Right above the knee amputation   Neurological: He is alert and oriented to person, place, and time  No cranial nerve deficit  Skin: Skin is warm and dry     Right IJ line  Mid line old surgical scar on his back  Multiple old surgical scars on the sacral area  Open wound on the sacral area   Psychiatric: He has a normal mood and affect             Additional Data:      Lab Results: I have personally reviewed pertinent reports             Results from last 7 days   Lab Units 09/16/19  0458   WBC Thousand/uL 9 40   HEMOGLOBIN g/dL 7 9*   HEMATOCRIT % 24 8*   PLATELETS Thousands/uL 419   NEUTROS PCT % 73*   LYMPHS PCT % 15*   MONOS PCT % 11*   EOS PCT % 1             Results from last 7 days   Lab Units 09/16/19  0458   09/14/19  2201   SODIUM mmol/L 134*   < > 133*   POTASSIUM mmol/L 3 5*   < > 3 3*   CHLORIDE mmol/L 100   < > 98   CO2 mmol/L 28   < > 27   BUN mg/dL 8   < > 18   CREATININE mg/dL 0 42*   < > 0 50*   ANION GAP mmol/L 6   < > 8   CALCIUM mg/dL 8 4   < > 8 4   ALBUMIN g/dL  --   --  3 3   TOTAL BILIRUBIN mg/dL  --   --  0 40   ALK PHOS U/L  --   --  95   ALT U/L  --   --  8*   AST U/L  --   --  19   GLUCOSE RANDOM mg/dL 91   < > 105*    < > = values in this interval not displayed            Results from last 7 days   Lab Units 09/14/19  2201   INR   1 15*             Results from last 7 days   Lab Units 09/15/19  1132 09/15/19  0603 09/15/19  0114   POC GLUCOSE mg/dl 95 97 157*                Results from last 7 days   Lab Units 09/15/19  0505 09/14/19  2201   LACTIC ACID mmol/L  --  0 8   PROCALCITONIN ng/ml 0 72* 0 76*         Imaging: I have personally reviewed pertinent reports        No orders to display         EKG, Pathology, and Other Studies Reviewed on Admission:   · EKG:      Allscripts / Epic Records Reviewed: Yes      ** Please Note: This note has been constructed using a voice recognition system   **

## 2019-09-16 NOTE — ASSESSMENT & PLAN NOTE
Baseline around 8 5-9  Hemoglobin today 7 9  No signs of active bleed  Continue to monitor, transfuse if less than 7  Continue ferrous sulfate and vitamin-C

## 2019-09-16 NOTE — UTILIZATION REVIEW
Initial Clinical Review    Admission: Date/Time/Statement: Inpatient Admission Orders (From admission, onward)     Ordered        09/14/19 2258  Inpatient Admission (expected length of stay for this patient Order details is greater than two midnights)  Once                   Orders Placed This Encounter   Procedures    Inpatient Admission (expected length of stay for this patient Order details is greater than two midnights)     Standing Status:   Standing     Number of Occurrences:   1     Order Specific Question:   Admitting Physician     Answer:   Gary Carter [[id number]]     Order Specific Question:   Level of Care     Answer:   Med Surg [16]     Order Specific Question:   Estimated length of stay     Answer:   More than 2 Midnights     Order Specific Question:   Certification     Answer:   I certify that inpatient services are medically necessary for this patient for a duration of greater than two midnights  See H&P and MD Progress Notes for additional information about the patient's course of treatment  ED Arrival Information     Expected Arrival Acuity Means of Arrival Escorted By Service Admission Type    - 9/14/2019 18:08 Urgent [address] [facility] ([facility]) Hospitalist Urgent    Arrival Complaint    Urinary Retention; Fever        Chief Complaint   Patient presents with    Fever - 9 weeks to 75 years     brought in by EMS - has had a fever since wedensday - was seen by FMD yesterday and was to have blood work done -  did not get blood drawn - took a urine sample with him  - was told he has infection in urine - did not want medication till tests returned - continues with fever - at 1700 had difficullt straight cathing self         Assessment/Plan: 63 y o  male who presents to the ED due to the inability to pass a dangelo catheter through his penis as he usually does 3x a day   Patient reports the last time he was hospitalized in August, it was because he was unable to pass the catheter due to urosepsis, +UTI (+ecoli)  He completed a 14 day course of antibiotics, it completed on 9/6  He went to his PCP on 9/13 because he states he was not feeling well  A urine culture was obtained at that time  On patient's last admission, his penile implant was infected and suggested to be surgically removed  Patient refused during that hospitalization and states that he will see his urologist, Dr Hyun Schmitt, in about 3 weeks when he returns from vacation  He plans to have it removed by him  An abd/pelvic CT scan on this admission reveals a small amount of fluid with the left perirectal/mesorectal space, likely residual to prior infection, without significant evidence of a deep pelvic and perineal soft tissue infection as seen on prior exam  Patient will be admitted for sepsis with broad spectrum antibiotics and evaluated by urology with the hope of expediting his urologic consultation with Dr Hyun Schmitt  Sepsis [facility])  Assessment & Plan  Patient admitted with temp-102, elevated WBC, +UTI  Patient reports he completed 14 day course of antibiotics last Friday, 9/6  He was admitted on 8/23 to [facility] with sepsis, complicated UTI, +Ecoli with 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  He was evaluated by general surgery and urology at that time  Patient was recommended to have penile implant removed urgently but patient refused  He plans to have it removed by his Urologist, Dr Mary Dasilva he returns from vacation in about 3 weeks     Patient given cefepime and vancomycin in the ED  Will continue cefepime and discuss need for additional vancomycin   Will re-consult urology for recommendations     Blood cultures, urine cultures sent        Complicated urinary tract infection  Assessment & Plan  Assessment/Plan as above  Urine culture pending     Hyperkalemia  Assessment & Plan  Will replete with Kdur 40 meq  Continue to monitor and replenish as necessary     History of Clostridium difficile colitis  Assessment & Plan  Will place on vancomycin prophylactically  Monitor for diarrhea    Patient will be admitted on an Inpatient basis with an anticipated length of stay of  Greater than 2 midnights  Justification for Hospital Stay: work up, monitor and treat sepsis    9/16 per ID:   1  Sepsis, POA  Patient presented on admission with high fever along with leukocytosis  This is likely due to problem 2 and issues with self catheterization  Patient clinically improving  Cultures pending  CT imaging without acute findings  Continue antibiotics as below  Continue to trend fever curve/vitals  Repeat CBC/chemistry tomorrow  Repeat blood cultures today  Additional care as per primary     2  Complicated urinary tract infection  Patient likely developed lower urinary tract infection due to recent issues with self catheterization and likely retention  No acute changes noted on CT of the abdomen  This is unfortunately a 2nd episode for the patient  Prior cultures reviewed  Will narrow patient is ceftriaxone  Continue to trend fever curve/vitals  Repeat labs tomorrow  Urology evaluation pending  Additional care as per primary  Would consider maintenance of Solomon catheter chronically     3  Gram-positive bacteremia  One of 2 blood culture sets noted to be positive with gram-positive cocci in clusters  Patient reports that there was difficulty in obtaining blood cultures on admission  He otherwise remains hemodynamically stable  Continue antibiotics as above  Will repeat blood cultures today  Will follow up pending culture data  Will interventions pending clinical course     4  Chronic sacral ulcers and penile implant  Patient noted with chronic sacral ulcerations which appear stable  He continues with local wound care daily  He also has a nonfunctional penile implant in place  Patient was recommended for non emergent removal of this device as an outpatient    Would recommend wound care consult while admitted  Follow-up with Urology as outpatient  Additional care as per primary     5  Paraplegia       6  History of C diff  Patient noted with prior history of C diff which is not documented in our system  He denies having any increased to his ostomy output    Start on oral vancomycin prophylaxis  Monitor ostomy output  Serial abdominal exam  Additional care as per primary     ED Triage Vitals   Temperature Pulse Respirations Blood Pressure SpO2   09/14/19 1816 09/14/19 1816 09/14/19 1816 09/14/19 1816 09/14/19 1816   (!) 102 4 °F (39 1 °C) (!) 120 16 117/89 100 %      Temp Source Heart Rate Source Patient Position - Orthostatic VS BP Location FiO2 (%)   09/14/19 1816 09/14/19 1816 09/14/19 1816 09/14/19 1816 --   Tympanic Monitor Sitting Left arm       Pain Score       09/14/19 1841       No Pain        Wt Readings from Last 1 Encounters:   09/14/19 68 5 kg (151 lb)     Additional Vital Signs:   Date/Time  Temp  Pulse  Resp  BP  SpO2  O2 Device  Patient Position - Orthostatic VS   09/16/19 0726  98 6 °F (37 °C)  98  18  111/67  99 %  None (Room air)  Lying   09/16/19 0000        97/56         09/15/19 2354  99 °F (37 2 °C)  110Abnormal   19  100/56  99 %    Lying   09/15/19 1515  100 °F (37 8 °C)  112Abnormal   20  120/80  99 %  None (Room air)  Lying   09/15/19 0733  98 1 °F (36 7 °C)  89  16  99/62  100 %  None (Room air)  Lying   09/14/19 2300  98 9 °F (37 2 °C)  100  20  161/67  97 %  Nasal cannula  Lying   09/14/19 2240  100 4 °F (38 °C)               09/14/19 2230    91  19    97 %       09/14/19 2215    103  21    97 %  Nasal cannula  Lying   09/14/19 2200    95  17  101/62  96 %       09/14/19 2148    101  20  98/56  97 %    Lying   09/14/19 2145    103  18    90 %       09/14/19 2130    122Abnormal   11Abnormal     96 %       09/14/19 2115    118Abnormal   17    94 %       09/14/19 2100    129Abnormal   17    93 %       09/14/19 2045    123Abnormal   22    98 %      09/14/19 1945            None (Room air)         Pertinent Labs/Diagnostic Test Results:     CT abdomen pelvis wo contrast   Final Result by Krissy Ernandez MD (09/14 2338)   Unremarkable left lower quadrant colostomy  No evidence of obstruction   Small amount of fluid within the left perirectal/mesorectal space, likely residual to prior infection, without significant evidence of a deep pelvic and perineal soft tissue infection as seen on prior exam of 8/23/2019  Residual focus of air seen along    the right inferior pubic rami, which may be ulcer related   XR chest portable - 1 view   Final Result by Mark Johnson MD (09/15 1342)   No acute cardiopulmonary disease           Results from last 7 days   Lab Units 09/16/19  0458 09/15/19  0505 09/14/19  2201   WBC Thousand/uL 9 40 10 30 13 90*   HEMOGLOBIN g/dL 7 9* 8 3* 8 7*   HEMATOCRIT % 24 8* 25 8* 27 0*   PLATELETS Thousands/uL 419 400 436   NEUTROS ABS Thousands/µL 6 90  --  11 50*         Results from last 7 days   Lab Units 09/16/19  0458 09/15/19  0505 09/14/19  2201   SODIUM mmol/L 134* 135* 133*   POTASSIUM mmol/L 3 5* 3 6 3 3*   CHLORIDE mmol/L 100 101 98   CO2 mmol/L 28 28 27   ANION GAP mmol/L 6 6 8   BUN mg/dL 8 13 18   CREATININE mg/dL 0 42* 0 42* 0 50*   EGFR ml/min/1 73sq m 128 128 119   CALCIUM mg/dL 8 4 8 5 8 4     Results from last 7 days   Lab Units 09/14/19  2201   AST U/L 19   ALT U/L 8*   ALK PHOS U/L 95   TOTAL PROTEIN g/dL 8 2   ALBUMIN g/dL 3 3   TOTAL BILIRUBIN mg/dL 0 40     Results from last 7 days   Lab Units 09/15/19  1132 09/15/19  0603 09/15/19  0114   POC GLUCOSE mg/dl 95 97 157*     Results from last 7 days   Lab Units 09/16/19  0458 09/15/19  0505 09/14/19  2201   GLUCOSE RANDOM mg/dL 91 96 105*     Results from last 7 days   Lab Units 09/14/19  2201   PROTIME seconds 12 6*   INR  1 15*   PTT seconds 27         Results from last 7 days   Lab Units 09/15/19  0505 09/14/19  2201   PROCALCITONIN ng/ml 0 72* 0 76*     Results from last 7 days   Lab Units 09/14/19  2201   LACTIC ACID mmol/L 0 8                 Results from last 7 days   Lab Units 09/14/19  2201   FERRITIN ng/mL 278                     Results from last 7 days   Lab Units 09/14/19  1943 09/13/19  1104   CLARITY UA  Cloudy* cloudy   COLOR UA  Yellow dark yellow   SPEC GRAV UA  1 010  --    PH UA  6 0  --    GLUCOSE UA mg/dl Negative neg   KETONES UA mg/dl Negative neg   BLOOD UA  250 0* moderate   PROTEIN UA mg/dl 30 (1+)* 30   NITRITE UA  Negative +   BILIRUBIN UA  Negative  --    BILIRUBIN UA POC   --  neg   UROBILINOGEN UA mg/dL Negative negative   LEUKOCYTES UA  500 0* ++   WBC UA /hpf Innumerable*  --    RBC UA /hpf 2-4*  --    BACTERIA UA /hpf Innumerable*  --    EPITHELIAL CELLS WET PREP /hpf Occasional  --      Results from last 7 days   Lab Units 09/14/19 2202 09/13/19  1107   GRAM STAIN RESULT  Gram positive cocci in clusters*  --    URINE CULTURE   --  >100,000 cfu/ml Escherichia coli*     ED Treatment:   Medication Administration from 09/14/2019 1808 to 09/14/2019 2340       Date/Time Order Dose Route Action Action by Comments     09/14/2019 1851 sodium chloride 0 9 % infusion 1,000 mL/hr Intravenous New Bag Bishop Liza RN      09/14/2019 2206 cefepime (MAXIPIME) IVPB (premix) 2,000 mg 2,000 mg Intravenous New Bag Amalia Neri RN      09/14/2019 1841 ibuprofen (MOTRIN) tablet 800 mg 800 mg Oral Given Bishop Liza RN      09/14/2019 2139 fentanyl citrate (PF) 100 MCG/2ML 25 mcg 25 mcg Intravenous Given Amalia Neri RN      09/14/2019 2312 vancomycin (VANCOCIN) IVPB (premix) 1,000 mg 1,000 mg Intravenous New Bag Amalia Neri RN         Past Medical History:   Diagnosis Date    Anemia     Blind     r eye    Chronic cystitis     Col[facility] in place [facility])     Detrusor sphincter dyssynergia     Diabetes mellitus ([address] )     Poorly controlled type 2; Last Assessed:  3/18/14    Erectile dysfunction     Frequency of urination     GERD (gastroesophageal reflux disease)     History of diabetes mellitus     History of osteomyelitis     Hx of leg amputation (HCC)     r high upper leg    Hyperlipidemia     Hypertension     Hypospadias     Incomplete bladder emptying     Neurogenic bladder     Paralysis (HCC)     Paraplegia (HCC)     Spinal cord cysts     Ulcer of sacral region ([facility] [address] )     Urge incontinence      Present on Admission:   Stage IV pressure ulcer of sacral region ([facility] [address] )   (Resolved) 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, stage 3 (HCC)   Complicated urinary tract infection   Benign essential hypertension   Diabetes mellitus type II, controlled ([facility] [address] )   Anemia   Paraplegic spinal paralysis ([facility] [address] )   Opioid use   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, subsequent encounter   (Resolved) Sepsis ([facility] [address] )   Hyperkalemia      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]  UTI (urinary tract infection) [N39 0]  Fever in adult [R50 9]  Sepsis ([facility] [address] ) [A41 9]  Age/Sex: 63 y o  male  Admission Orders:    Current Facility-Administered Medications:  ascorbic acid 500 mg Oral Daily With Breakfast Pearson Collet, MD   aspirin 81 mg Oral Daily KALPANA Lala   cefTRIAXone 1,000 mg Intravenous Q24H Liberty Rincon MD   enoxaparin 40 mg Subcutaneous Daily KALPANA Lala   ferrous sulfate 325 mg Oral Daily With Breakfast Pearson Collet, MD   folic acid 600 mcg Oral Daily Pearson Collet, MD   oxyCODONE 20 mg Oral Q8H PRN KALPANA Lala   saccharomyces boulardii 250 mg Oral BID Pearson Collet, MD   vancomycin 125 mg Oral Q12H [address] Liberty Rnicon MD     SCD's  Blood & urine cultures  Contact isolation-mrsa    IP CONSULT TO WOUND CARE  IP CONSULT TO INFECTIOUS DISEASES  IP CONSULT TO UROLOGY    Network Utilization Review Department  Phone: [phone]; Fax [phone]  [email or web address]  ATTENTION: Please call with any questions or concerns to [phone]  and carefully listen to the prompts so that you are directed to the right person  Send all requests for admission clinical reviews, approved or denied determinations and any other requests to fax [phone]   All voicemails are confidential

## 2019-09-16 NOTE — ASSESSMENT & PLAN NOTE
09/14/2019 patient was admitted to [facility] for sepsis with elevated white blood cell count, temperature of 102F  Received fluid resuscitation and antibiotics  Continue antibiotics per ID  Patient is afebrile with white blood cell count 13 9-->9 4  Monitor vitals    Patient was admitted 08/23/2019 with sepsis secondary to complicated UTI  Urine culture positive for E coli ESBL there was a concern for Fourniere's gangrene and infection of the penile implant  Gangrene was ruled out  Patient was recommended to have penile implant removed which he refused

## 2019-09-16 NOTE — NURSING NOTE
Pt having little output from dangelo  Flushed with 20 of NSS, about 50 ml of urine drained, sediment as well  Bladder scanned pt @ 24 ml  No distention  Will continue to monitor

## 2019-09-16 NOTE — ASSESSMENT & PLAN NOTE
Possible urinary tract infection secondary to neurogenic bladder/urinary retention/self catheterizations   Urine culture from 09/13/2019 growing E coli ESBL  Repeat urine culture from 09/14/2019 showed mixed contaminants  Evaluated by Infectious Disease, appreciate recommendations  Patient was transferred from [facility] on Ceftriaxone and Romeo XIE changed ceftriaxone to Macrobid today    Patient has a Solomon, urine is clean

## 2019-09-16 NOTE — ASSESSMENT & PLAN NOTE
Neurogenic bladder with bladder outlet obstruction, prior CIC at home, s/p Solomon catheter insertion now with discontinuous urethra, now s/p SPT insertion on 9/20/19  Postoperative day 3 status post IR placement of suprapubic catheter, [address]  Draining well with clear yellow urine overnight  Current coverage with Zosyn and vancomycin per Infectious Disease with oral vancomycin for C diff prophylaxis

## 2019-09-16 NOTE — CONSULTS
Consult - Urology   Margoth Blancas 1960, 63 y o  male MRN: [id number]    Unit/Bed#: E2 -01 Encounter: [id number]    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[facility])  Assessment & Plan  Currently implant in place, nonfunctioning  At high risk for a Maerlindata Buggy an active infection, secondary to decubitus ulcer and recurrent UTI  I did offer him explant of this prosthesis during this hospitalization, but I do not feel it is actively infected or life-threatening at this time, he continues to wish to see Dr Elizabeth Holman as an outpatient with scheduled elective removal of the implant  Will continue with serial exams  Will defer to wound Care for topical care of the scrotum  If any evidence of ulceration or clinical change in exam, or if his sepsis from UTI does not improve with appropriate antibiotic coverage, plan would be for explant of this prosthesis during this hospitalization  Reviewed that with the patient  * Complicated urinary tract infection  Assessment & Plan  Neurogenic bladder with bladder outlet obstruction, CIC at home  Difficulty passing catheter from CIC a home several days before presenting to the hospital   Solomon catheter decompression  Current coverage ceftriaxone  Gram-positive bacteremia  Infectious Disease following  Currently decompressed with Solomon catheter, recommend maintaining for now  Possibly long-term or indefinitely  Definitely until can be seen as an outpatient with plan for penile prosthesis explant at an interval     I did also offer him suprapubic catheter, but he is comfortable with the Solomon catheter, as such we should maintain Solomon catheter to gravity drainage upon discharge and through visit with Dr Elizabeth Holman  Consider exchanging Solomon catheter prior to discharge  Bedside rounds performed with ALFREDO Orantes  Discussed with Dr Tye Allen, [address] [place] [address] with ID  Subjective/Objective     Subjective:    This medically complex [id number] has a complicated  history, he is known to Dr Susan Dickey  He had inflatable penile prosthesis placed many years ago, subsequently he developed erosion of the posterior aspect of 1 of the cylinders from decubitus ulcer from his paraplegia, he was taken to the operating room and part of his prosthesis was removed, a continue to function but is now not functioning anymore  He has a history of neurogenic bladder from a spinal cyst in 1989 causing paraplegia, he has a history of a small contracted noncompliant bladder with history of ileal cystoplasty augmentation of the bladder in 2014 by Dr Val Barrientos, typically when he is well he undergoes clean intermittent catheterization at home without issue  He was recently hospitalized in late August, less than a month ago, with urinary tract infection after he had difficulty straight catheterizes himself at home  At that time, Urology place this catheter  He is currently admitted with sepsis secondary to complicated UTI  He reports that at home, he had his Solomon catheter removed from his prior hospitalization on 9/6/2019, and began doing CIC a home per his normal schedule  Two days ago he developed difficulty to perform CIC, and subsequently developed fevers and proceeded to the ER for evaluation  He continues to report that his penile prosthesis nonfunctioning  He was scheduled to see Dr Susan Dickey as an outpatient on 10/8/2019, at which point he was to discuss elective removal of the prosthesis  This was offered to him on prior hospitalization, but he did not want surgery at that time  Solomon catheter was placed earlier this hospitalization  He denies any pre-hospital hematuria, frequency  He reports resistance and inability to pass the catheter  He does have a sacral decubitus ulcer for which he is receiving regular wound care  Urology is asked to see him in the presence of this complicated UTI in combination with indwelling prosthesis       He has a history of C difficile colitis, on oral vancomycin per Infectious Disease  Infectious Disease following  He did have Gram-positive bacteremia on 1 of 2 blood cultures, with repeat blood cultures pending  Current antibiotic coverage with ceftriaxone  Currently he complains of sacral pain  Otherwise offers no complaints  Solomon catheter was placed previously during this admission is currently patent for yareli urine  Wound care consultation is pending for recommendations regarding sacral wound  Review of Systems   Constitutional: Positive for fatigue and fever  Negative for activity change and appetite change  HENT: Negative for congestion and ear pain  Eyes: Negative for pain  Respiratory: Negative for cough and shortness of breath  Cardiovascular: Negative for chest pain and palpitations  Gastrointestinal: Negative for abdominal distention, abdominal pain, blood in stool, constipation, diarrhea and nausea  Genitourinary: Negative for difficulty urinating, dysuria, flank pain, hematuria, penile pain, penile swelling, scrotal swelling and testicular pain  Musculoskeletal: Negative for arthralgias and myalgias  Skin: Negative for rash  Allergic/Immunologic: Negative for immunocompromised state  Neurological: Negative for dizziness and headaches  Hematological: Negative for adenopathy  Does not bruise/bleed easily  Psychiatric/Behavioral: Negative for agitation  The patient is not nervous/anxious  Objective:  Vitals: Blood pressure (!) 86/63, pulse 96, temperature 100 2 °F (37 9 °C), temperature source Tympanic, resp  rate 18, height 5' 7" (1 702 m), weight 68 3 kg (150 lb 9 2 oz), SpO2 96 %  ,Body mass index is 23 58 kg/m²      No intake or output data in the 24 hours ending 09/16/19 1556    Invasive Devices     Central Venous Catheter Line            CVC Central Lines 09/14/19 Triple Right Subclavian 1 day          Drain            Colostomy Descending/sigmoid LLQ -- days    Urethral Catheter Non-latex 16 Fr  1 day                Physical Exam   Constitutional: He is oriented to person, place, and time  He is cooperative  He does not appear ill  No distress  80-year-old male with right-sided cataract, resting comfortably in bed  No acute distress  Chatting with family member at the bedside  HENT:   Head: Normocephalic and atraumatic  Moist mucous membranes  Eyes: Conjunctivae and EOM are normal    Neck: Normal range of motion  Neck supple  No tracheal deviation present  Cardiovascular: Normal rate, regular rhythm and normal heart sounds  No murmur heard  Pulmonary/Chest: Effort normal and breath sounds normal  No respiratory distress  He has no wheezes  Good airflow bilaterally on deep inspiration  Abdominal: Soft  Bowel sounds are normal  He exhibits no distension and no mass  There is no tenderness  Abdomen obese, soft and benign  Ostomy intact draining soft brown stool  Genitourinary:   Genitourinary Comments:  evaluation reveals Solomon catheter pain for yareli urine at this time  No penile abnormalities or erosion noted  IPP pump palpable in the scrotum, with some superficial scrotal ulceration/pink skin  There is no part of the pump which is currently eroding or visible  There is no crepitus, cellulitis or drainage  No concern for active infection of this implant at this time  Musculoskeletal:   Status post amputation of the right lower extremity  Neurological: He is alert and oriented to person, place, and time  Skin: Skin is warm and dry  No rash noted  He is not diaphoretic  No erythema  No pallor  Psychiatric: He has a normal mood and affect  His behavior is normal  Judgment and thought content normal    Nursing note and vitals reviewed        History:    Past Medical History:   Diagnosis Date    Anemia     Blind     r eye    Chronic cystitis     Col[facility] in place [facility])     Detrusor sphincter dyssynergia     Diabetes mellitus ([facility] [address] )     Poorly controlled type 2; Last Assessed:  3/18/14    Erectile dysfunction     Frequency of urination     GERD (gastroesophageal reflux disease)     History of diabetes mellitus     History of osteomyelitis     Hx of leg amputation (HCC)     r high upper leg    Hyperlipidemia     Hypertension     Hypospadias     Incomplete bladder emptying     Neurogenic bladder     Paralysis (HCC)     Paraplegia (HCC)     Spinal cord cysts     Ulcer of sacral region [facility])     Urge incontinence      Past Surgical History:   Procedure Laterality Date    AMPUTATION      At hip; Last Assessed:  1/19/16    BLADDER SURGERY      COLON SURGERY      llq ostomy pouch    COLOSTOMY      COMPLEX CYSTOMETROGRAM  2014    CYSTOSCOPY  2014    LEG AMPUTATION      MEATOTOMY      PENILE PROSTHESIS IMPLANT  2011    OR ADJ TISS XFER SCALP,EXTREM 10 1-30 SQCM Left 5/1/2017    Procedure: POSTERIOR THIGH V-Y ADMANCEMENT;  Surgeon: Johnny Cannon MD;  [facility];  Service: Plastics    OR MUSCLE-SKIN FLAP,TRUNK Left 5/1/2017    Procedure: FLAP CLOSURE LEFT ISCHIAL WOUND and "RIGHT" ISCHIAL FLAP ADVANCEMENT * DEBRIDEMENT, [place] ;  Surgeon: Johnny Cannon MD;  [facility];  Service: Plastics    OR MUSCLE-SKIN FLAP,TRUNK Left 9/27/2017    Procedure: gluteal myocutaneous rotational flap, posterior thigh v to y advancement- wound 5 x 2 5 x 8;  Surgeon: Johnny Cannon MD;  [facility];  Service: Plastics    SPINE SURGERY      Lower back    UROFLOWMETRY SIMPLE / COMPLEX  2014     Family History   Problem Relation Age of Onset    No Known Problems Mother     No Known Problems Father      Social History     Socioeconomic History    Marital status: /Civil Union     Spouse name: Not on file    Number of children: Not on file    Years of education: Not on file    Highest education level: Not on file   Occupational History    Not on file   Social Needs    Financial resource strain: Not on file    Food insecurity:     Worry: Not on file     Inability: Not on file    Transportation needs:     Medical: Not on file     Non-medical: Not on file   Tobacco Use    Smoking status: Former Smoker     Packs/day: 0 50     Years: 10 00     Pack years: 5 00     Last attempt to quit:      Years since quittin 7    Smokeless tobacco: Never Used    Tobacco comment: Onset date:  11/10/17   Substance and Sexual Activity    Alcohol use: Never     Frequency: Never     Comment: Per [employer]:  Social drinker (Onset date:  11/10/17)    Drug use: No    Sexual activity: Not on file   Lifestyle    Physical activity:     Days per week: Not on file     Minutes per session: Not on file    Stress: Not on file   Relationships    Social connections:     Talks on phone: Not on file     Gets together: Not on file     Attends Scientologist service: Not on file     Active member of club or organization: Not on file     Attends meetings of clubs or organizations: Not on file     Relationship status: Not on file    Intimate partner violence:     Fear of current or ex partner: Not on file     Emotionally abused: Not on file     Physically abused: Not on file     Forced sexual activity: Not on file   Other Topics Concern    Not on file   Social History Narrative    [address]    Social history reviewed, unchanged     Imaging:  CT the abdomen pelvis with contrast on 2019,  Impression:       Unremarkable left lower quadrant colostomy   No evidence of obstruction    Small amount of fluid within the left perirectal/mesorectal space, likely residual to prior infection, without significant evidence of a deep pelvic and perineal soft tissue infection as seen on prior exam of 2019   Residual focus of air seen along   the right inferior pubic rami, which may be ulcer related  Penile implant in place  Imaging reviewed - both report and images personally reviewed       Labs:  Recent Labs     19  2201 09/15/19  7621 09/16/19  0458   WBC 13 90* 10 30 9 40     Recent Labs     09/14/19  2201 09/15/19  0505 09/16/19  0458   HGB 8 7* 8 3* 7 9*       Recent Labs     09/14/19  2201 09/15/19  0505 09/16/19  0458   CREATININE 0 50* 0 42* 0 42*     Microbiology:  Urine culture 9/13/2019 with ESBL, sensitivities reviewed  Gram-positive cocci in cultures on blood culture 9/14/2019, repeat cultures pending      Mike Real PA-C  Date: 9/16/2019 Time: 3:56 PM

## 2019-09-16 NOTE — ASSESSMENT & PLAN NOTE
09/14/2019 patient was admitted to [facility] for sepsis with elevated white blood cell count, temperature of 102F  Received fluid resuscitation and antibiotics  Continue antibiotics per ID  Patient is afebrile with white blood cell count 13 9-->9 4  Monitor vitals    Patient was admitted 08/23/2019 with sepsis secondary to complicated UTI  Urine culture positive for E coli ESBL there was a concern for Fourniere's gangrene and infection of the penile implant  Gangrene was ruled out  Patient was recommended to have penile implant removed which he refused

## 2019-09-16 NOTE — ASSESSMENT & PLAN NOTE
Lab Results   Component Value Date    HGBA1C 5 2 05/06/2019       Recent Labs     09/15/19  0114 09/15/19  0603 09/15/19  1132   POCGLU 157* 97 95       Blood Sugar Average: Last 72 hrs:   diet controlled  Last hemoglobin A1c 05/06/2019 was 5 2  Continue to monitor

## 2019-09-16 NOTE — NURSING NOTE
Pt discharged  Attempted report to number provided, awaiting return call back  Pt transported via stretcher with BLS to [address] [place] [address]

## 2019-09-16 NOTE — ASSESSMENT & PLAN NOTE
Possible urinary tract infection secondary to neurogenic bladder/urinary retention/self catheterizations   Urine culture from 09/13/2019 growing E coli ESBL  Repeat urine culture from 09/14/2019 showed mixed contaminants  Evaluated by Infectious Disease, appreciate recommendations  Patient was transferred from [facility] on Ceftriaxone and Romeo XIE changed ceftriaxone to Macrobid today    Patient has a Solomon, urine is clean

## 2019-09-16 NOTE — NURSING NOTE
Pt resting in bed comfortably, no signs of distress  IJ dressing to right subclavian is CDI  Colostomy in place, draining loose brown stool  Solomon draining clear yellow urine  Pts dressing to sacrum is CDI  All needs within reach, will continue to monitor

## 2019-09-16 NOTE — QUICK NOTE
Patient was seen for formal infectious disease consult this morning while admitted to [facility]  He has since been transferred to [facility] for ongoing care  Patient's urine culture from 9/13/19 was positive for ESBL e coli  Will change patient's ABX regimen from Ceftriaxone to Macrobid  New urine culture was obtained on 9/14/19 but resulted with mixed contaminants  I have spoken to the lab and requested they further speciate sample  Patient initial blood cultures are showing GPC in one set  Will need to get repeat blood cultures now  Will hold off completing formal ID re-consult for now as patient already had formal consult at [facility] this am  Patient will be reassessed by ID tomorrow morning  Discussed above plan with [facility] attending, Dr Yadi Porter  We are pleased to continue on the care team for this patient

## 2019-09-16 NOTE — DISCHARGE INSTR - OTHER ORDERS
Wound Care:  1-Prevalon boot to offload pressure on left heel  2-Hydraguard lotion to left heel and scrotum twice per day  3-Offloading cushion in chair when out of bed  4-Moisturize skin daily with lotion  5-Turn/reposition every 2 hours for pressure re-distribution on skin--use positioning wedges  6-Low air-loss mattress  7-Left distal buttock stage 4--cleanse with normal saline  3m cavilon no sting skin barrier film to gaby-wound skin  Pack wound with mesalt packing ribbon  Tape end of packing to gaby-wound skin  Terra Alek over mesal packing and cover with Allevyn Life foam dressing  Change dressing daily and PRN with soilage  8-Right distal buttock--cleanse with soap and water, pat dry  Apply Triad paste daily and PRN with soilage  Follow-up at wound center--[phone]

## 2019-09-16 NOTE — PROGRESS NOTES
Progress Note - Angelica Cabrera 1960, 63 y o  male MRN: [id number]    Unit/Bed#: E2 -01 Encounter: [id number]    Primary Care Provider: Tiara Wood MD   Date and time admitted to hospital: 9/16/2019 11:44 AM        * Complicated urinary tract infection  Assessment & Plan  Possible urinary tract infection secondary to neurogenic bladder/urinary retention/self catheterizations   Urine culture from 09/13/2019 growing E coli ESBL  Repeat urine culture from 09/14/2019 showed mixed contaminants  Evaluated by Infectious Disease, appreciate recommendations  Patient was transferred from [address] on Ceftriaxone and Vanco   ID changed ceftriaxone to Macrobid today  Patient has a Solomon, urine is clean      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[facility])  Assessment & Plan  Patient has a nonfunctional penile prosthesis  Possible infection of the penile prosthesis  Evaluated by Urology on the previous admission 08/23/2019, it was recommended for it to be removed, patient refused, wanted to do it as outpatient  CT abd 9/14/19 "Small amount of fluid within the left perirectal/mesorectal space, likely residual to prior infection, without significant evidence of a deep pelvic and perineal soft tissue infection as seen on prior exam of 8/23/2019  Residual focus of air seen along   the right inferior pubic rami, which may be ulcer related"  Urology consult placed  Evaluated by Infectious Disease  Continue current antibiotics  Sepsis [facility])  Assessment & Plan  09/14/2019 patient was admitted to [address] for sepsis with elevated white blood cell count, temperature of 102F  Received fluid resuscitation and antibiotics  Continue antibiotics per ID  Patient is afebrile with white blood cell count 13 9-->9 4  Monitor vitals    Patient was admitted 08/23/2019 with sepsis secondary to complicated UTI    Urine culture positive for E coli ESBL there was a concern for Fourniere's gangrene and infection of the penile implant  Gangrene was ruled out  Patient was recommended to have penile implant removed which he refused  Bacteremia  Assessment & Plan  One set of blood cultures taken 09/14/2019 growing GPC in clusters  Repeat blood cultures today    Paraplegic spinal paralysis [facility])  Assessment & Plan  Secondary to spinal cyst  Frequent repositioning      History of Clostridium difficile colitis  Assessment & Plan  Patient with a history of C diff  Per ID started on oral vancomycin prophylaxis  Monitor ostomy output    Stage IV pressure ulcer of sacral region [facility])  Assessment & Plan  Wound care consult placed    Colostomy in place [facility])  Assessment & Plan  Secondary to chronic sacral ulcers    Neurogenic bladder  Assessment & Plan  Solomon in place    Opioid use  Assessment & Plan  PDMP reviewed  Continue Oxy 20 mg q 8h p r n  Diabetes mellitus type II, controlled [facility])  Assessment & Plan  Lab Results   Component Value Date    HGBA1C 5 2 05/06/2019       Recent Labs     09/15/19  0114 09/15/19  0603 09/15/19  1132   POCGLU 157* 97 95       Blood Sugar Average: Last 72 hrs:   diet controlled  Last hemoglobin A1c 05/06/2019 was 5 2  Continue to monitor    Anemia  Assessment & Plan  Baseline around 8 5-9  Hemoglobin today 7 9  No signs of active bleed  Continue to monitor, transfuse if less than 7  Continue ferrous sulfate and vitamin-C    VTE Prophylaxis: Enoxaparin (Lovenox)  / sequential compression device   Code Status: Full code  POLST: There is no POLST form on file for this patient (pre-hospital)  Discussion with family: no family at bedside    Anticipated Length of Stay:  Patient will be admitted on an Inpatient basis with an anticipated length of stay of   2 midnights  Justification for Hospital Stay: possible need for penile implant removal, on IV antibiotics    Total Time for Visit, including Counseling / Coordination of Care: 45 minutes    Greater than 50% of this total time spent on direct patient counseling and coordination of care  Chief Complaint:   Patient unable to do self catheterization    History of Present Illness:    Lemuel Guillaume is a 63 y o  male who presents as a transfer from [facility] for urology evaluation  Patient is a 44-year-old male with a past medical history of paraplegia secondary to spinal cysts, chronic sacral ulcer, status post colostomy due to sacral ulcer, neurogenic bladder, history of  osteoyelitis with right above the knee amputation, history of ED s/p penile implant who presented to [address] [place] [address]  because he was unable to self cath, started feeling chills 2 days prior to presentation  He was diagnosed with sepsis due to complicated UTI versus penile implant infection, received fluid resuscitation and IV antibiotics, evaluated by ID  Due to possible penile implant infection he was transferred to [facility] for urology evaluation  Patient is currently afebrile, his white blood cell count improved 13 9-->9 4  Patient has a Solomon  Urine culture from 09/13/2019 growing E coli ESBL  Blood cultures from 09/14/2019 1 set growing GPC in clusters  Transfered on ceftriaxone and vancomycin per ID  Patient was admitted 08/23/2019 for sepsis  He was unable to self cath at that time too  There was a possibility of Vega gangrene on the CT  He was evaluated by Urology and surgery and Vega gangrene was ruled out  He was treated for complicated urinary tract infection  He received 14 days of antibiotics, last dose 09/04/2019  Patient was recommended penile implant to be removed, he refused it on that admission  He wanted to do it as outpatient  Sent home with a Solomon which was removed 09/06/2019  He was seen by Urology as outpatient, he was supposed to have an elective removal after his urologist is back from vacation  Review of Systems:    Review of Systems   Constitutional: Negative for activity change, appetite change, chills, fatigue and fever  HENT: Negative for congestion, ear pain, facial swelling, hearing loss, rhinorrhea, sinus pain and sore throat  Eyes: Negative for pain, discharge and redness  Hx of right vision loss   Respiratory: Negative for apnea, cough, chest tightness, shortness of breath and wheezing  Cardiovascular: Negative for chest pain, palpitations and leg swelling  Gastrointestinal: Negative for abdominal distention, abdominal pain, blood in stool, diarrhea, nausea and vomiting  Colostomy   Genitourinary:        Self cathing   Musculoskeletal: Negative for arthralgias and back pain  Skin: Negative for pallor and rash  Sacral wound   Neurological: Negative for dizziness, seizures, syncope, facial asymmetry, light-headedness, numbness and headaches  Psychiatric/Behavioral: Negative for agitation, behavioral problems and confusion         Past Medical and Surgical History:     Past Medical History:   Diagnosis Date    Anemia     Blind     r eye    Chronic cystitis     Col[facility] in [facility])     Detrusor sphincter dyssynergia     Diabetes mellitus ([facility] [address] )     Poorly controlled type 2; Last Assessed:  3/18/14    Erectile dysfunction     Frequency of urination     GERD (gastroesophageal reflux disease)     History of diabetes mellitus     History of osteomyelitis     Hx of leg amputation (HCC)     r high upper leg    Hyperlipidemia     Hypertension     Hypospadias     Incomplete bladder emptying     Neurogenic bladder     Paralysis ([address] )     Paraplegia (HCC)     Spinal cord cysts     Ulcer of sacral region ([facility] [address] )     Urge incontinence        Past Surgical History:   Procedure Laterality Date    AMPUTATION      At hip; Last Assessed:  1/19/16    BLADDER SURGERY      COLON SURGERY      llq ostomy pouch    COLOSTOMY      COMPLEX CYSTOMETROGRAM  2014    CYSTOSCOPY  2014    LEG AMPUTATION      MEATOTOMY      PENILE PROSTHESIS IMPLANT  2011    CO ADJ TISS XFER SCALP,EXTREM 10 1-30 SQCM Left 5/1/2017    Procedure: POSTERIOR THIGH V-Y ADMANCEMENT;  Surgeon: Zaid Bennett MD;  [facility];  Service: Plastics    NH MUSCLE-SKIN FLAP,TRUNK Left 5/1/2017    Procedure: FLAP CLOSURE LEFT ISCHIAL WOUND and "RIGHT" ISCHIAL FLAP ADVANCEMENT * DEBRIDEMENT, VAC PLACEMENT ;  Surgeon: Zaid Bennett MD;  [facility];  Service: Plastics    NH MUSCLE-SKIN FLAP,TRUNK Left 9/27/2017    Procedure: gluteal myocutaneous rotational flap, posterior thigh v to y advancement- wound 5 x 2 5 x 8;  Surgeon: Zaid Bennett MD;  [facility];  Service: Plastics    SPINE SURGERY      Lower back    UROFLOWMETRY SIMPLE / COMPLEX  2014       Meds/Allergies:    Prior to Admission medications    Medication Sig Start Date End Date Taking?  Authorizing Provider   ACCU-CHEK FASTCLIX LANCETS MISC by Other route 2 (two) times a day Test as directed 1/18/19   Lamar Ayala MD   ACCU-CHEJUAN SMARTVIEW test strip TEST TWICE DAILY 7/25/19   Lamar Ayala MD   ASPIRIN LOW DOSE 81 MG EC tablet TAKE 1 TABLET BY MOUTH EVERY DAY 7/29/19   Lamar Ayala MD   Disposable Gloves (LATEX GLOVES LARGE) MISC Use as needed up to 3 times a day dispo 2 boxes 5/22/19   Lamar Ayala MD   ibuprofen (MOTRIN) 800 mg tablet TAKE 1 TABLET(800 MG) BY MOUTH DAILY AS NEEDED FOR MODERATE PAIN 7/29/19   Lamar Ayala MD   Incontinence Supply Disposable (SUNBEAM INCONTINENT UNDER PAD) MISC Use 1 per week, dispense 4 reusable underpads 5/31/19   Lamar Ayala MD   Incontinence Supply Disposable MISC by Does not apply route 2 (two) times a day 5/22/19   Lamar Ayala MD   Nutritional Supplements (GLUCERNA 1 0 JOHANN/CARBSTEADY) LIQD Take 1 Can by mouth 3 (three) times a day Dispense 90 cans per month 5/18/18   Lamar Ayala MD   oxyCODONE (ROXICODONE) 20 MG TABS Take 1 tablet (20 mg total) by mouth every 8 (eight) hours as needed for moderate painMax Daily Amount: 60 mg 9/4/19   Lamar Ayala MD   SUPREP BOWEL PREP KIT 17 5-3 13-1 6 GM/177ML SOLN Take 1 kit by mouth once for 1 dose Please follow instructions as per the office  19  Mike De La Rosa MD     I have reviewed home medications with patient personally  Allergies: No Known Allergies    Social History:     Marital Status: /Civil Union   Occupation:  Unemployed  Patient Pre-hospital Living Situation: living alone with aids helping him  Patient Pre-hospital Level of Mobility: bed bound  Patient Pre-hospital Diet Restrictions: carb consistent diet  Substance Use History:   Social History     Substance and Sexual Activity   Alcohol Use Never    Frequency: Never    Comment: Per Allscripts:  Social drinker (Onset date:  11/10/17)     Social History     Tobacco Use   Smoking Status Former Smoker    Packs/day: 0 50    Years: 10 00    Pack years: 5 00    Last attempt to quit: F[place]ad Lora Years since quittin 7   Smokeless Tobacco Never Used   Tobacco Comment    Onset date:  11/10/17     Social History     Substance and Sexual Activity   Drug Use No       Family History:    Family History   Problem Relation Age of Onset    No Known Problems Mother     No Known Problems Father        Physical Exam:     Vitals:   Blood Pressure: (!) 86/63 (19 1500)  Pulse: 96 (19 1500)  Temperature: 100 2 °F (37 9 °C) (19 1500)  Temp Source: Tympanic (19 1500)  Respirations: 18 (19 1500)  Height: 5' 7" (170 2 cm) (19 1145)  Weight - Scale: 68 3 kg (150 lb 9 2 oz) (19 1145)  SpO2: 96 % (19 1500)    Physical Exam   Constitutional: He is oriented to person, place, and time  He appears well-nourished  Seems chronically ill but in no acute distress   HENT:   Head: Normocephalic and atraumatic  Eyes:   Right corneal defect   Neck: Normal range of motion  Neck supple  Cardiovascular: Normal rate, regular rhythm and normal heart sounds  Exam reveals no gallop and no friction rub  No murmur heard    Pulmonary/Chest: Effort normal and breath sounds normal  No respiratory distress  He has no wheezes  Abdominal: Soft  Bowel sounds are normal  He exhibits no distension and no mass  There is no tenderness  There is no guarding  Colostomy in place, patent   Genitourinary:   Genitourinary Comments: Solomon in place  Scrotum with superficial wounds   Musculoskeletal:   Right above the knee amputation   Neurological: He is alert and oriented to person, place, and time  No cranial nerve deficit  Skin: Skin is warm and dry  Right IJ line  Mid line old surgical scar on his back  Multiple old surgical scars on the sacral area  Open wound on the sacral area   Psychiatric: He has a normal mood and affect  Additional Data:     Lab Results: I have personally reviewed pertinent reports  Results from last 7 days   Lab Units 09/16/19  0458   WBC Thousand/uL 9 40   HEMOGLOBIN g/dL 7 9*   HEMATOCRIT % 24 8*   PLATELETS Thousands/uL 419   NEUTROS PCT % 73*   LYMPHS PCT % 15*   MONOS PCT % 11*   EOS PCT % 1     Results from last 7 days   Lab Units 09/16/19  0458  09/14/19  2201   SODIUM mmol/L 134*   < > 133*   POTASSIUM mmol/L 3 5*   < > 3 3*   CHLORIDE mmol/L 100   < > 98   CO2 mmol/L 28   < > 27   BUN mg/dL 8   < > 18   CREATININE mg/dL 0 42*   < > 0 50*   ANION GAP mmol/L 6   < > 8   CALCIUM mg/dL 8 4   < > 8 4   ALBUMIN g/dL  --   --  3 3   TOTAL BILIRUBIN mg/dL  --   --  0 40   ALK PHOS U/L  --   --  95   ALT U/L  --   --  8*   AST U/L  --   --  19   GLUCOSE RANDOM mg/dL 91   < > 105*    < > = values in this interval not displayed  Results from last 7 days   Lab Units 09/14/19  2201   INR  1 15*     Results from last 7 days   Lab Units 09/15/19  1132 09/15/19  0603 09/15/19  0114   POC GLUCOSE mg/dl 95 97 157*         Results from last 7 days   Lab Units 09/15/19  0505 09/14/19  2201   LACTIC ACID mmol/L  --  0 8   PROCALCITONIN ng/ml 0 72* 0 76*       Imaging: I have personally reviewed pertinent reports        No orders to display       EKG, Pathology, and Other Studies Reviewed on Admission:   · EKG:     Allscripts / Epic Records Reviewed: Yes     ** Please Note: This note has been constructed using a voice recognition system   **

## 2019-09-17 ENCOUNTER — APPOINTMENT (INPATIENT)
Dept: CT IMAGING | Facility: HOSPITAL | Age: 59
DRG: 871 | End: 2019-09-17
Payer: MEDICARE

## 2019-09-17 PROBLEM — L89.302 STAGE II PRESSURE ULCER OF BUTTOCK (HCC): Status: ACTIVE | Noted: 2019-09-17

## 2019-09-17 LAB
ALBUMIN SERPL BCP-MCNC: 1.6 G/DL (ref 3.5–5)
ALP SERPL-CCNC: 70 U/L (ref 46–116)
ALT SERPL W P-5'-P-CCNC: 18 U/L (ref 12–78)
ANION GAP SERPL CALCULATED.3IONS-SCNC: 8 MMOL/L (ref 4–13)
AST SERPL W P-5'-P-CCNC: 21 U/L (ref 5–45)
ATRIAL RATE: 125 BPM
BACTERIA UR CULT: ABNORMAL
BASOPHILS # BLD AUTO: 0.03 THOUSANDS/ΜL (ref 0–0.1)
BASOPHILS NFR BLD AUTO: 0 % (ref 0–1)
BILIRUB SERPL-MCNC: 0.21 MG/DL (ref 0.2–1)
BUN SERPL-MCNC: 7 MG/DL (ref 5–25)
CALCIUM SERPL-MCNC: 9 MG/DL (ref 8.3–10.1)
CHLORIDE SERPL-SCNC: 101 MMOL/L (ref 100–108)
CO2 SERPL-SCNC: 28 MMOL/L (ref 21–32)
CREAT SERPL-MCNC: 0.51 MG/DL (ref 0.6–1.3)
EOSINOPHIL # BLD AUTO: 0.16 THOUSAND/ΜL (ref 0–0.61)
EOSINOPHIL NFR BLD AUTO: 2 % (ref 0–6)
ERYTHROCYTE [DISTWIDTH] IN BLOOD BY AUTOMATED COUNT: 16.4 % (ref 11.6–15.1)
GFR SERPL CREATININE-BSD FRML MDRD: 118 ML/MIN/1.73SQ M
GLUCOSE SERPL-MCNC: 87 MG/DL (ref 65–140)
HCT VFR BLD AUTO: 26.1 % (ref 36.5–49.3)
HGB BLD-MCNC: 7.6 G/DL (ref 12–17)
IMM GRANULOCYTES # BLD AUTO: 0.03 THOUSAND/UL (ref 0–0.2)
IMM GRANULOCYTES NFR BLD AUTO: 0 % (ref 0–2)
LYMPHOCYTES # BLD AUTO: 1.55 THOUSANDS/ΜL (ref 0.6–4.47)
LYMPHOCYTES NFR BLD AUTO: 21 % (ref 14–44)
MCH RBC QN AUTO: 23.5 PG (ref 26.8–34.3)
MCHC RBC AUTO-ENTMCNC: 29.1 G/DL (ref 31.4–37.4)
MCV RBC AUTO: 81 FL (ref 82–98)
MONOCYTES # BLD AUTO: 0.64 THOUSAND/ΜL (ref 0.17–1.22)
MONOCYTES NFR BLD AUTO: 9 % (ref 4–12)
NEUTROPHILS # BLD AUTO: 5.08 THOUSANDS/ΜL (ref 1.85–7.62)
NEUTS SEG NFR BLD AUTO: 68 % (ref 43–75)
NRBC BLD AUTO-RTO: 0 /100 WBCS
P AXIS: 59 DEGREES
PLATELET # BLD AUTO: 412 THOUSANDS/UL (ref 149–390)
PMV BLD AUTO: 9.7 FL (ref 8.9–12.7)
POTASSIUM SERPL-SCNC: 3.6 MMOL/L (ref 3.5–5.3)
PR INTERVAL: 142 MS
PROCALCITONIN SERPL-MCNC: 0.22 NG/ML
PROT SERPL-MCNC: 8 G/DL (ref 6.4–8.2)
QRS AXIS: 28 DEGREES
QRSD INTERVAL: 74 MS
QT INTERVAL: 306 MS
QTC INTERVAL: 441 MS
RBC # BLD AUTO: 3.24 MILLION/UL (ref 3.88–5.62)
SODIUM SERPL-SCNC: 137 MMOL/L (ref 136–145)
T WAVE AXIS: 58 DEGREES
VENTRICULAR RATE: 125 BPM
WBC # BLD AUTO: 7.49 THOUSAND/UL (ref 4.31–10.16)

## 2019-09-17 PROCEDURE — 99232 SBSQ HOSP IP/OBS MODERATE 35: CPT | Performed by: UROLOGY

## 2019-09-17 PROCEDURE — 80053 COMPREHEN METABOLIC PANEL: CPT | Performed by: INTERNAL MEDICINE

## 2019-09-17 PROCEDURE — 87040 BLOOD CULTURE FOR BACTERIA: CPT | Performed by: INTERNAL MEDICINE

## 2019-09-17 PROCEDURE — 84145 PROCALCITONIN (PCT): CPT | Performed by: INTERNAL MEDICINE

## 2019-09-17 PROCEDURE — 99233 SBSQ HOSP IP/OBS HIGH 50: CPT | Performed by: INTERNAL MEDICINE

## 2019-09-17 PROCEDURE — 74177 CT ABD & PELVIS W/CONTRAST: CPT

## 2019-09-17 PROCEDURE — 85025 COMPLETE CBC W/AUTO DIFF WBC: CPT | Performed by: INTERNAL MEDICINE

## 2019-09-17 PROCEDURE — 93010 ELECTROCARDIOGRAM REPORT: CPT | Performed by: INTERNAL MEDICINE

## 2019-09-17 PROCEDURE — 99223 1ST HOSP IP/OBS HIGH 75: CPT | Performed by: INTERNAL MEDICINE

## 2019-09-17 RX ORDER — ACETAMINOPHEN 325 MG/1
650 TABLET ORAL EVERY 6 HOURS PRN
Status: DISCONTINUED | OUTPATIENT
Start: 2019-09-17 | End: 2019-09-25 | Stop reason: HOSPADM

## 2019-09-17 RX ADMIN — NITROFURANTOIN (MONOHYDRATE/MACROCRYSTALS) 100 MG: 75; 25 CAPSULE ORAL at 09:44

## 2019-09-17 RX ADMIN — OXYCODONE HYDROCHLORIDE 20 MG: 10 TABLET ORAL at 19:52

## 2019-09-17 RX ADMIN — NITROFURANTOIN (MONOHYDRATE/MACROCRYSTALS) 100 MG: 75; 25 CAPSULE ORAL at 17:56

## 2019-09-17 RX ADMIN — IOHEXOL 100 ML: 350 INJECTION, SOLUTION INTRAVENOUS at 16:33

## 2019-09-17 RX ADMIN — IOHEXOL 50 ML: 240 INJECTION, SOLUTION INTRATHECAL; INTRAVASCULAR; INTRAVENOUS; ORAL at 16:34

## 2019-09-17 RX ADMIN — FERROUS SULFATE TAB 325 MG (65 MG ELEMENTAL FE) 325 MG: 325 (65 FE) TAB at 09:45

## 2019-09-17 RX ADMIN — Medication 125 MG: at 09:46

## 2019-09-17 RX ADMIN — ASPIRIN 81 MG: 81 TABLET, COATED ORAL at 09:45

## 2019-09-17 RX ADMIN — OXYCODONE HYDROCHLORIDE 20 MG: 10 TABLET ORAL at 00:25

## 2019-09-17 RX ADMIN — Medication 250 MG: at 09:44

## 2019-09-17 RX ADMIN — ACETAMINOPHEN 650 MG: 325 TABLET ORAL at 02:01

## 2019-09-17 RX ADMIN — ENOXAPARIN SODIUM 40 MG: 40 INJECTION SUBCUTANEOUS at 09:45

## 2019-09-17 RX ADMIN — Medication 250 MG: at 17:56

## 2019-09-17 RX ADMIN — FOLIC ACID 500 MCG: 1 TABLET ORAL at 09:45

## 2019-09-17 RX ADMIN — OXYCODONE HYDROCHLORIDE AND ACETAMINOPHEN 500 MG: 500 TABLET ORAL at 09:45

## 2019-09-17 RX ADMIN — OXYCODONE HYDROCHLORIDE 20 MG: 10 TABLET ORAL at 09:46

## 2019-09-17 NOTE — CONSULTS
Consultation - Infectious Disease   Shoaib Vazquez Moscat 63 y o  male MRN: [id number]  Unit/Bed#: E2 -01 Encounter: [id number]    IMPRESSION & RECOMMENDATIONS:   1  Sepsis  POA  Fever, tachycardia, leukocytosis  Likely secondary to complicated urinary tract infection in the setting of patient with urinary retention  Urine culture is preliminarily polymicrobial showing oxidase positive Gram-negative rods, Gram-negative enteric like rods, and Staph aureus  A previous recent urine culture showed E coli ESBL  CT of the abdomen and pelvis showed no acute findings with patient's kidneys  There was a small fluid collection in the perirectal space without signs of deeper infection  There was also air along the right inferior pubic rami likely related to his chronic buttock wound  Blood cultures showed coag-negative Staphylococcus in one set which is likely representative of a skin contaminant and not in acute bacteremia  Repeat blood cultures are pending  Fortunately the patient has remained clinically stable and nontoxic  Today he is afebrile and his WBC count has normalized  -antibiotics as below  -monitor CBC and BMP  -follow up blood cultures  -follow up repeat blood cultures  -follow up urine culture  -monitor vitals  -his care     2  Complicated urinary tract infection  Likely related to patient having difficulty with self catheterization  No acute changes to the kidneys noted on CT of the abdomen  Also consider secondary to penile prosthesis which will likely need to be removed  Patient is now status post placement of a Solomon catheter  He has had good urine output this morning  I suspect patient would benefit from placement of a suprapubic catheter as his retention issues are recurring  He is following closely with Urology  Patient was transition from IV ceftriaxone to [address] yesterday  He is tolerating without difficulty    Will continue for now while we await final urine culture results   -continue p o  Macrobid  -monitor CBC and BMP  -follow up urine culture  -monitor vitals  -monitor urine output  -serial Solomon catheter care and exams  -continue close follow-up with Urology     3  Coag-negative Staphylococcus bacteremia  Showing in one set of his initial blood cultures  I suspect this is representative of skin contamination and not in active bacteremia  Patient is clinically stable without fever or leukocytosis  Repeat blood cultures are pending   -no indication for antibiotics for this issue  -monitor CBC and BMP  -follow up repeat blood cultures  -monitor vitals     4  Chronic sacral ulcer  Chronically probes to bone  The majority of the wound bed is pink and moist   Today there is a significant amount of foul-smelling tan mucus-like drainage coming from 6-7 o'clock  Wound palpated to something firm in that same region for ID attending  Wound was repacked with SilvaSorb gel on fluffed gauze and covered with an ABD  Unclear if this area communicates to the penile implant  Discussed with Urology, patient will need reimaging of the region, recommend CT with contrast   He is following with wound management  -recommend CT with contrast of the abdomen/pelvis  -serial wound exams  -local wound care per wound management team  -continue close follow-up with wound management     5  Penile implant  It is unlikely that this is a functioning implant at this time  It has been recommended that patient consider having it removed  He is agreeable but would prefer waiting for his outpatient urologist to return from vacation  Patient now with significant pus in his chronic buttock wound  It is unclear if this is communicating to the implant  He will go for a CT of the abdomen/pelvis  If a communication exists the penile implant will need to be removed  -follow-up CT of the abdomen/pelvis  -continue inpatient Urology follow-up  -continue outpatient follow-up with Urology    6   Scrotal excoriations  Patient with multiple superficial balta excoriations on the base of the scrotum as well as on long the penis  These areas have a base of pink fibrous tissue and are not weeping  Patient unable to feel pain due to his paraplegia  Scrotum has no palpable fluctuance  He is following closely with wound management   -serial scrotum exams  -continue close follow-up with wound management    7  Paraplegia  -supportive care     8  History of C diff  Patient noted with prior history of C diff which is not documented in our system  He denies having any increased to his ostomy output  He has no ongoing abdominal pain or cramping  He is receiving PO Vancomycin for prophylaxis and is tolerating without difficulty  -continue PO vancomycin prophylaxis  -monitor abdominal symptoms  -monitor stool output in ostomy bag    Thank you for the consult  We will continue to follow along  Above plan was discussed in detail with patient at the bedside and with his family who was on speaker on his cell phone  Above plan was discussed in detail with urology Renetta BACA  HISTORY OF PRESENT ILLNESS:  Reason for Consult: Sepsis, UTI, stage IV pressure ulcer of the sacrum, infection and inflammatory response to penile prosthesis  HPI: Kyleigh Au is a 63y o  year old male who presented to the [address] [place] [address] on 09/14/2019 after he was unable to perform self catheterization  Patient states he was meeting resistance as he attempted the catheterization  He also was developing fevers at home  Upon arrival to the ED the patient's temperature was 102 4° with a heart rate of 120  His white count was 13 9  Lactic acid was 0 8  The patient's creatinine was 0 5 with a GFR of 119  A procalcitonin was checked and was found to be elevated at 0 76  He was taken for chest x-ray which was negative for acute infectious cardiopulmonary findings    He was taken for a CT of the abdomen and pelvis which showed no abnormality with the kidneys and no evidence of hydronephrosis  He did have a small fluid collection in the perirectal space but no signs of deeper infection  There was also air along the right inferior pubic rami which was likely due to his chronic buttock wound  Patient was given a dose of cefepime and vancomycin  He was placed on prophylactic PO vancomycin due to his history of C diff  He was admitted for additional medical management  During his admission at [facility] the patient was seen by infectious disease provider, Dr Nicolás Hi   The patient showed [address] in one set of his initial blood cultures  She transition his antibiotic to IV ceftriaxone  Urology was consulted on the patient and requested he be transferred to [facility] for additional assessment  The patient arrived at [place] [address]  on 09/16/2019  His initial urine culture from 09/13/2019 finalized as E coli ESBL in his antibiotic regimen was transitioned to [address]  A repeat urine culture that showed mixed alonso was sent back to lab for further speciation  Final results are pending  Fortunately he has been clinically stable and nontoxic  Today he is afebrile and his white blood cell count has normalized  He is now having significant tan discharge from his buttock wound  We have been asked for formal consult for sepsis, UTI, stage IV pressure ulcer of the sacrum, and inflection/inflammatory response to penile prosthesis      The patient has a past medical and surgical history significant for spinal cord cyst, neurogenic bladder, osteomyelitis, anemia, urge incontinence, blindness, cystitis, erectile dysfunction, paralyses, sacral pressure ulcer, hyperlipidemia, hypertension, type 2 diabetes mellitus, GERD, urinary frequency, detrusor sphincter dyssynergia, colostomy, right BKA, hypospadias, incomplete bladder emptying, neurogenic bladder, former smoker, uroflowmetry, cystoscopy, colon surgery, bladder surgery, penile prosthesis implant, low back surgery, flap closure for nonhealing wound, and meatotomy  The patient has no known drug allergies  REVIEW OF SYSTEMS:  Patient reports he is feeling well today  States this morning he has good urine output in his Solomon  He is not having any pain in his wounds but states he normally does not feel that region  He denies nausea, vomiting, abdominal pain  States he is having normal output in his ostomy bag  No cough, shortness of breath, or chest pain  He denies headache and dizziness  A complete 12 point system-based review of systems is otherwise negative      PAST MEDICAL HISTORY:  Past Medical History:   Diagnosis Date    Anemia     Blind     r eye    Chronic cystitis     Colostomy in place [facility])     Detrusor sphincter dyssynergia     Diabetes mellitus ([facility] [address] )     Poorly controlled type 2; Last Assessed:  3/18/14    Erectile dysfunction     Frequency of urination     GERD (gastroesophageal reflux disease)     History of diabetes mellitus     History of osteomyelitis     Hx of leg amputation (HCC)     r high upper leg    Hyperlipidemia     Hypertension     Hypospadias     Incomplete bladder emptying     Neurogenic bladder     [facility])     Paraplegia ([facility])     Spinal cord cysts     Ulcer of sacral region ([facility] [address] )     Urge incontinence      Past Surgical History:   Procedure Laterality Date    AMPUTATION      At hip; Last Assessed:  1/19/16    BLADDER SURGERY      COLON SURGERY      llq ostomy pouch    COLOSTOMY      COMPLEX CYSTOMETROGRAM  2014    CYSTOSCOPY  2014    LEG AMPUTATION      MEATOTOMY      PENILE PROSTHESIS IMPLANT  2011    OR ADJ TISS XFER SCALP,EXTREM 10 1-30 SQCM Left 5/1/2017    Procedure: POSTERIOR THIGH V-Y ADMANCEMENT;  Surgeon: Zaid Bennett MD;  [facility];  Service: Plastics    OR MUSCLE-SKIN FLAP,TRUNK Left 5/1/2017    Procedure: FLAP CLOSURE LEFT ISCHIAL WOUND and "RIGHT" ISCHIAL FLAP ADVANCEMENT * DEBRIDEMENT, VAC PLACEMENT ;  Surgeon: Rose Vicnent MD;  [facility];  Service: Plastics    VT MUSCLE-SKIN FLAP,TRUNK Left 2017    Procedure: gluteal myocutaneous rotational flap, posterior thigh v to y advancement- wound 5 x 2 5 x 8;  Surgeon: Rose Vincent MD;  [facility];  Service: Plastics    SPINE SURGERY      Lower back    UROFLOWMETRY SIMPLE / COMPLEX       FAMILY HISTORY:  Non-contributory    SOCIAL HISTORY:  Social History   Social History     Substance and Sexual Activity   Alcohol Use Never    Frequency: Never    Comment: Per [employer]:  Social drinker (Onset date:  11/10/17)     Social History     Substance and Sexual Activity   Drug Use No     Social History     Tobacco Use   Smoking Status Former Smoker    Packs/day: 0 50    Years: 10 00    Pack years: 5 00    Last attempt to quit:     Years since quittin 7   Smokeless Tobacco Never Used   Tobacco Comment    Onset date:  11/10/17     ALLERGIES:  No Known Allergies    MEDICATIONS:  All current active medications have been reviewed  ANTIBIOTICS:  Macrobid 2  Antibiotics 4    PHYSICAL EXAM:  Temp:  [97 °F (36 1 °C)-100 9 °F (38 3 °C)] 97 °F (36 1 °C)  HR:  [] 101  Resp:  [16-18] 18  BP: ()/(53-73) 100/67  SpO2:  [95 %-98 %] 97 %  Temp (24hrs), Av 8 °F (37 1 °C), Min:97 °F (36 1 °C), Max:100 9 °F (38 3 °C)  Current: Temperature: (!) 97 °F (36 1 °C)    Intake/Output Summary (Last 24 hours) at 2019 0811  Last data filed at 2019 0701  Gross per 24 hour   Intake 0 ml   Output 650 ml   Net -650 ml     General Appearance:  Appearing well, nontoxic, and in no distress   Patient appears chronically debilitated   Head:  Normocephalic, without obvious abnormality, atraumatic   Eyes:  Conjunctiva pink and sclera anicteric, both eyes   Nose: Nares normal, mucosa normal, no drainage   Throat: Oropharynx moist without lesions   Neck: Supple, symmetrical, no adenopathy, no tenderness/mass/nodules   Back:   Symmetric; No CVA tenderness; Buttock draining has significant tan pus accumulating and is leaking through edge of dressing, wound is mostly pink and edges are rolled, tunneling noted in several directions but seems to probe to firm tissue (unclear if this is bone or the penile prosthesis)   Lungs:   Clear to auscultation bilaterally, respirations unlabored   Chest Wall:  No tenderness or deformity   Heart:  Tachycardic; no murmur, rub or gallop   Abdomen:   Soft, non-tender, non-distended, positive bowel sounds throughout; Ostomy pouch intact, stool is soft brown   Genitourinary: Solomon catheter intact, urine output is yellow   Extremities: No cyanosis or clubbing; R BKA, stump healed   Skin: No rashes or lesions  Lymph nodes: Cervical, supraclavicular nodes normal   Neurologic: Alert and oriented times 3, follows commands, symmetric facial movement     LABS, IMAGING, & OTHER STUDIES:  Lab Results:  I have personally reviewed pertinent labs  Results from last 7 days   Lab Units 09/17/19  0456 09/16/19  0458 09/15/19  0505   WBC Thousand/uL 7 49 9 40 10 30   HEMOGLOBIN g/dL 7 6* 7 9* 8 3*   PLATELETS Thousands/uL 412* 419 400     Results from last 7 days   Lab Units 09/17/19  0456  09/14/19  2201   POTASSIUM mmol/L 3 6   < > 3 3*   CHLORIDE mmol/L 101   < > 98   CO2 mmol/L 28   < > 27   BUN mg/dL 7   < > 18   CREATININE mg/dL 0 51*   < > 0 50*   EGFR ml/min/1 73sq m 118   < > 119   CALCIUM mg/dL 9 0   < > 8 4   AST U/L 21  --  19   ALT U/L 18  --  8*   ALK PHOS U/L 70  --  95    < > = values in this interval not displayed  Results from last 7 days   Lab Units 09/14/19  2202 09/14/19  2201 09/14/19  1943 09/13/19  1107   BLOOD CULTURE   --  No Growth at 24 hrs   --   --    Spurger Heller STAIN RESULT  Gram positive cocci in clusters*  --   --   --    URINE CULTURE   --   --  Culture results to follow   >100,000 cfu/ml Escherichia coli ESBL*     Imaging Studies:   No imaging to review so far during this hospitalization      Other Studies:   Blood cultures are pending

## 2019-09-17 NOTE — PLAN OF CARE
Problem: Potential for Falls  Goal: Patient will remain free of falls  Description  INTERVENTIONS:  - Assess patient frequently for physical needs  -  Identify cognitive and physical deficits and behaviors that affect risk of falls  -  [place] fall precautions as indicated by assessment   - Educate patient/family on patient safety including physical limitations  - Instruct patient to call for assistance with activity based on assessment  - Modify environment to reduce risk of injury  - Consider OT/PT consult to assist with strengthening/mobility  Outcome: Progressing     Problem: Prexisting or High Potential for Compromised Skin Integrity  Goal: Skin integrity is maintained or improved  Description  INTERVENTIONS:  - Identify patients at risk for skin breakdown  - Assess and monitor skin integrity  - Assess and monitor nutrition and hydration status  - Monitor labs   - Assess for incontinence   - Turn and reposition patient  - Assist with mobility/ambulation  - Relieve pressure over bony prominences  - Avoid friction and shearing  - Provide appropriate hygiene as needed including keeping skin clean and dry  - Evaluate need for skin moisturizer/barrier cream  - Collaborate with interdisciplinary team   - Patient/family teaching  - Consider wound care consult   Outcome: Progressing     Problem: Nutrition/Hydration-ADULT  Goal: Nutrient/Hydration intake appropriate for improving, restoring or maintaining nutritional needs  Description  Monitor and assess patient's nutrition/hydration status for malnutrition  Collaborate with interdisciplinary team and initiate plan and interventions as ordered  Monitor patient's weight and dietary intake as ordered or per policy  Utilize nutrition screening tool and intervene as necessary  Determine patient's food preferences and provide high-protein, high-caloric foods as appropriate       INTERVENTIONS:  - Monitor oral intake, urinary output, labs, and treatment plans  - Assess nutrition and hydration status and recommend course of action  - Evaluate amount of meals eaten  - Assist patient with eating if necessary   - Allow adequate time for meals  - Recommend/ encourage appropriate diets, oral nutritional supplements, and vitamin/mineral supplements  - Order, calculate, and assess calorie counts as needed  - Recommend, monitor, and adjust tube feedings and TPN/PPN based on assessed needs  - Assess need for intravenous fluids  - Provide specific nutrition/hydration education as appropriate  - Include patient/family/caregiver in decisions related to nutrition  Outcome: Progressing

## 2019-09-17 NOTE — ASSESSMENT & PLAN NOTE
Baseline around 8 5-9  Hemoglobin today 7 6  No signs of active bleed  Continue to monitor, transfuse if less than 7  Continue ferrous sulfate and vitamin-C

## 2019-09-17 NOTE — ASSESSMENT & PLAN NOTE
One set of blood cultures taken 09/14/2019 growing GPC in clusters  Repeated blood cultures 9/17 pending

## 2019-09-17 NOTE — ASSESSMENT & PLAN NOTE
Possible urinary tract infection secondary to neurogenic bladder/urinary retention/self catheterizations/bladder outlet obstruction   Urine culture from 09/13/2019 growing E coli ESBL  Repeat urine culture from 09/14/2019 showed mixed contaminants  Evaluated by Infectious Disease, appreciate recommendations  Patient was transferred from [facility] on Ceftriaxone  ID changed ceftriaxone to [facility] 9/16/19  Patient has a Solomon, urine is clean  Per urology, plan to keep the Solomon at least until he is evaluated by his Urologist Kings Wagner as outpatient if he does not wish to remove his implant earlier

## 2019-09-17 NOTE — UTILIZATION REVIEW
Continued Stay Review    Date:    9/17/2019                          Current Patient Class:    INPATIENT  Current Level of Care:   Med  Surg    HPI:59 y o  male initially admitted on      [address]   9/14/2019    TRANSFERRED TO [facility]  9/16/2019     Assessment/Plan: * Complicated urinary tract infection  Assessment & Plan  Possible urinary tract infection secondary to neurogenic bladder/urinary retention/self catheterizations   Urine culture from 09/13/2019 growing E coli ESBL  Repeat urine culture from 09/14/2019 showed mixed contaminants  Evaluated by Infectious Disease, appreciate recommendations  Patient was transferred from [facility] on Ceftriaxone and Vanco   ID changed ceftriaxone to Macrobid today  Patient has a Solomon, urine is clean        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[facility])  Assessment & Plan  Patient has a nonfunctional penile prosthesis  Possible infection of the penile prosthesis  Evaluated by Urology on the previous admission 08/23/2019, it was recommended for it to be removed, patient refused, wanted to do it as outpatient  CT abd 9/14/19 "Small amount of fluid within the left perirectal/mesorectal space, likely residual to prior infection, without significant evidence of a deep pelvic and perineal soft tissue infection as seen on prior exam of 8/23/2019   Residual focus of air seen along   the right inferior pubic rami, which may be ulcer related"  Urology consult placed  Evaluated by Infectious Disease  Continue current antibiotics      Sepsis [facility])  Assessment & Plan  09/14/2019 patient was admitted to [facility] for sepsis with elevated white blood cell count, temperature of 102F  Received fluid resuscitation and antibiotics  Continue antibiotics per ID  Patient is afebrile with white blood cell count 13 9-->9 4  Monitor vitals  Patient was admitted 08/23/2019 with sepsis secondary to complicated UTI    Urine culture positive for E coli ESBL there was a concern for Fourniere's gangrene and infection of the penile implant  Gangrene was ruled out  Patient was recommended to have penile implant removed which he refused      Bacteremia  Assessment & Plan  One set of blood cultures taken 09/14/2019 growing GPC in clusters  Repeat blood cultures today     Paraplegic spinal paralysis [facility])  Assessment & Plan  Secondary to spinal cyst  Frequent repositioning        History of Clostridium difficile colitis  Assessment & Plan  Patient with a history of C diff  Per ID started on oral vancomycin prophylaxis  Monitor ostomy output     Stage IV pressure ulcer of sacral region [facility])  Assessment & Plan  Wound care consult placed     Colostomy in place [facility])  Assessment & Plan  Secondary to chronic sacral ulcers     Neurogenic bladder  Assessment & Plan  Oslomon in place     Opioid use  Assessment & Plan  PDMP reviewed  Continue Oxy 20 mg q 8h p r n      Diabetes mellitus type II, controlled ([facility] [address] )  Anemia  Assessment & Plan  Baseline around 8 5-9  Hemoglobin today 7 9  No signs of active bleed  Continue to monitor, transfuse if less than 7  Continue ferrous sulfate and vitamin-C        VTE Prophylaxis: Enoxaparin (Lovenox)  / sequential compression device   Code Status: Full code  POLST: There is no POLST form on file for this patient (pre-hospital)  Discussion with family: no family at bedside     Anticipated Length of Stay: Vinny Lazaro will be admitted on an Inpatient basis with an anticipated length of stay of   2 midnights    Justification for Hospital Stay: possible need for penile implant removal, on IV antibiotics              Rikki Arguello is a 63 y  o  male who presents as a transfer from [facility] for urology evaluation   Patient is a 81-year-old male with a past medical history of paraplegia secondary to spinal cysts, chronic sacral ulcer, status post colostomy due to sacral ulcer, neurogenic bladder, 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, history of ED s/p penile implant who presented to [facility] because he was unable to self cath, started feeling chills 2 days prior to presentation  [facility] was diagnosed with sepsis due to complicated UTI versus penile implant infection, received fluid resuscitation and IV antibiotics, evaluated by ID   Due to possible penile implant infection he was transferred to [facility] for urology evaluation  Patient is currently afebrile, his white blood cell count improved 13 9-->9  4   Patient has a Solomon   Urine culture from 09/13/2019 growing E coli ESBL   Blood cultures from 09/14/2019 1 set growing GPC in clusters   Transfered on ceftriaxone and vancomycin per ID    Patient was admitted 08/23/2019 for sepsis   He was unable to self cath at that time too  Ruiz Schroeder a possibility of Vega gangrene on the [facility] was evaluated by Urology and surgery and Vega gangrene was ruled out  [facility] was treated for complicated urinary tract infection   He received 14 days of antibiotics, last dose 09/04/2019  Arnold Molina was recommended penile implant to be removed, he refused it on that admission  [facility] wanted to do it as outpatient   Sent home with a Solomon which was removed 09/06/2019  He was seen by Urology as outpatient, he was supposed to have an elective removal after his urologist is back from vacation  Per  Urology  Consult    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[facility])  Assessment & Plan  Currently implant in place, nonfunctioning  At high risk for a Vinayak Shelby an active infection, secondary to decubitus ulcer and recurrent UTI  I did offer him explant of this prosthesis during this hospitalization, but I do not feel it is actively infected or life-threatening at this time, he continues to wish to see Dr Mine Santamaria as an outpatient with scheduled elective removal of the implant      Will continue with serial exams  Will defer to wound Care for topical care of the scrotum    If any evidence of ulceration or clinical change in exam, or if his sepsis from UTI does not improve with appropriate antibiotic coverage, plan would be for explant of this prosthesis during this hospitalization  Reviewed that with the patient      * Complicated urinary tract infection  Assessment & Plan  Neurogenic bladder with bladder outlet obstruction, CIC at home  Difficulty passing catheter from CIC a home several days before presenting to the hospital   Solomon catheter decompression  Current coverage ceftriaxone  Gram-positive bacteremia  Infectious Disease following  Per  ID  Consult:     Patient was seen for formal infectious disease consult this morning while admitted to [address] [place] [address]   He has since been transferred to [facility] for ongoing care  Patient's urine culture from 9/13/19 was positive for ESBL e coli  Will change patient's ABX regimen from Ceftriaxone to Macrobid  New urine culture was obtained on 9/14/19 but resulted with mixed contaminants  I have spoken to the lab and requested they further speciate sample  Patient initial blood cultures are showing GPC in one set  Will need to get repeat blood cultures now  Will hold off completing formal ID re-consult for now as patient already had formal consult at [facility] this am  Patient will be reassessed by ID tomorrow morning  Discussed above plan with [facility] attending, Dr Benton Clarity  We are pleased to continue on the care team for this patient      Pertinent Labs/Diagnostic Results:   Results from last 7 days   Lab Units 09/17/19  0456 09/16/19  0458 09/15/19  0505 09/14/19  2201   WBC Thousand/uL 7 49 9 40 10 30 13 90*   HEMOGLOBIN g/dL 7 6* 7 9* 8 3* 8 7*   HEMATOCRIT % 26 1* 24 8* 25 8* 27 0*   PLATELETS Thousands/uL 412* 419 400 436   NEUTROS ABS Thousands/µL 5 08 6 90  --  11 50*         Results from last 7 days   Lab Units 09/17/19  0456 09/16/19  0458 09/15/19  0505 09/14/19  2201   SODIUM mmol/L 137 134* 135* 133*   POTASSIUM mmol/L 3 6 3 5* 3 6 3 3*   CHLORIDE mmol/L 101 100 101 98   CO2 mmol/L 28 28 28 27   ANION GAP mmol/L 8 6 6 8   BUN mg/dL 7 8 13 18   CREATININE mg/dL 0 51* 0 42* 0 42* 0 50*   EGFR ml/min/1 73sq m 118 128 128 119   CALCIUM mg/dL 9 0 8 4 8 5 8 4     Results from last 7 days   Lab Units 09/17/19  0456 09/14/19  2201   AST U/L 21 19   ALT U/L 18 8*   ALK PHOS U/L 70 95   TOTAL PROTEIN g/dL 8 0 8 2   ALBUMIN g/dL 1 6* 3 3   TOTAL BILIRUBIN mg/dL 0 21 0 40     Results from last 7 days   Lab Units 09/15/19  1132 09/15/19  0603 09/15/19  0114   POC GLUCOSE mg/dl 95 97 157*     Results from last 7 days   Lab Units 09/17/19  0456 09/16/19  0458 09/15/19  0505 09/14/19  2201   GLUCOSE RANDOM mg/dL 87 91 96 105*           Results from last 7 days   Lab Units 09/14/19  2201   PROTIME seconds 12 6*   INR  1 15*   PTT seconds 27         Results from last 7 days   Lab Units 09/17/19  0456 09/15/19  0505 09/14/19  2201   PROCALCITONIN ng/ml 0 22 0 72* 0 76*     Results from last 7 days   Lab Units 09/14/19  2201   LACTIC ACID mmol/L 0 8                 Results from last 7 days   Lab Units 09/14/19  2201   FERRITIN ng/mL 278           Results from last 7 days   Lab Units 09/14/19  1943 09/13/19  1104   CLARITY UA  Cloudy* cloudy   COLOR UA  Yellow dark yellow   SPEC GRAV UA  1 010  --    PH UA  6 0  --    GLUCOSE UA mg/dl Negative neg   KETONES UA mg/dl Negative neg   BLOOD UA  250 0* moderate   PROTEIN UA mg/dl 30 (1+)* 30   NITRITE UA  Negative +   BILIRUBIN UA  Negative  --    BILIRUBIN UA POC   --  neg   UROBILINOGEN UA mg/dL Negative negative   LEUKOCYTES UA  500 0* ++   WBC UA /hpf Innumerable*  --    RBC UA /hpf 2-4*  --    BACTERIA UA /hpf Innumerable*  --    EPITHELIAL CELLS WET PREP /hpf Occasional  --            Results from last 7 days   Lab Units 09/14/19 2202 09/14/19  2201 09/14/19 1943 09/13/19  1107   BLOOD CULTURE   --  No Growth at 24 hrs   --   --    [place] STAIN RESULT  Gram positive cocci in clusters*  --   --   --    URINE CULTURE   --   --  20,000-29,000 cfu/ml Oxidase Positive gram negative jackson*  10,000-19,000 cfu/ml Gram Negative Jackson Enteric Like*  <10,000 cfu/ml Staphylococcus aureus*  10,000-19,000 cfu/ml Gamma Hemolytic Streptococcus >100,000 cfu/ml Escherichia coli ESBL*               Vital Signs:   09/17/19 1136  97 9 °F (36 6 °C)  105  17  101/63  100 %    Lying   09/17/19 0800  97 °F (36 1 °C)Abnormal   101  18  100/67  97 %  None (Room air)  Lying   09/17/19 0330  98 °F (36 7 °C)  118Abnormal   18  101/58  95 %  None (Room air)  Lying   09/17/19 0137  98 3 °F (36 8 °C)  104  18  96/53  96 %  None (Room air)  Lying   09/16/19 2300  100 9 °F (38 3 °C)Abnormal   114Abnormal   16  104/73  97 %  None (Room air)  Lying         Medications:   Scheduled Meds:   Current Facility-Administered Medications:  acetaminophen 650 mg Oral Q6H PRN Odalis Walker PA-C   ascorbic acid 500 mg Oral Daily With Breakfast Lord Flip MD   aspirin 81 mg Oral Daily Maira Lazo MD   enoxaparin 40 mg Subcutaneous Daily Maira Lazo MD   ferrous sulfate 325 mg Oral Daily With Breakfast Lord Flip MD   folic acid 125 mcg Oral Daily Lord Flip MD   nitrofurantoin 100 mg Oral BID With Meals KALPANA Rodriges   oxyCODONE 20 mg Oral Q8H PRN Lord Flip MD   saccharomyces boulardii 250 mg Oral BID Lord Flip MD   vancomycin 125 mg Oral Q12H [address] Lord Flip MD       Discharge Plan: home    Network Utilization Review Department  Phone: [phone]; Fax [phone]  [email or web address]  ATTENTION: Please call with any questions or concerns to [phone]  and carefully listen to the prompts so that you are directed to the right person  Send all requests for admission clinical reviews, approved or denied determinations and any other requests to fax [phone]   All voicemails are confidential

## 2019-09-17 NOTE — ASSESSMENT & PLAN NOTE
Stage II pressure ulcer on the right distal buttock, present on admission  Frequent repositioning  Wound Care on board

## 2019-09-17 NOTE — ASSESSMENT & PLAN NOTE
Sepsis--RESOLVED  09/14/2019 patient was admitted to [facility] for sepsis with elevated white blood cell count, temperature of 102F  Received fluid resuscitation and antibiotics  Patient is afebrile with white blood cell count 13 9-->9 4-->7 49  Continue antibiotics per ID  Monitor vitals    Patient was admitted 08/23/2019 with sepsis secondary to complicated UTI  Urine culture positive for E coli ESBL there was a concern for Fourniere's gangrene and infection of the penile implant  Gangrene was ruled out  Patient was recommended to have penile implant removed which he refused

## 2019-09-17 NOTE — PROGRESS NOTES
Progress Note - Abimbola Vasquez 1960, 63 y o  male MRN: [id number]    Unit/Bed#: E2 -01 Encounter: [id number]    Primary Care Provider: Kyler Desai MD   Date and time admitted to hospital: 9/16/2019 11:44 AM        * Complicated urinary tract infection  Assessment & Plan  Possible urinary tract infection secondary to neurogenic bladder/urinary retention/self catheterizations/bladder outlet obstruction   Urine culture from 09/13/2019 growing E coli ESBL  Repeat urine culture from 09/14/2019 showed mixed contaminants  Evaluated by Infectious Disease, appreciate recommendations  Patient was transferred from [facility] on Ceftriaxone  ID changed ceftriaxone to [facility] 9/16/19  Patient has a Solomon, urine is clean  Per urology, plan to keep the Solomon at least until he is evaluated by his Urologist You Proctor as outpatient if he does not wish to remove his implant earlier  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[facility])  Assessment & Plan  Patient has a nonfunctional penile prosthesis  Possible infection of the penile prosthesis  Evaluated by Urology on the previous admission 08/23/2019, it was recommended for it to be removed, patient refused, wanted to do it as outpatient  CT abd 9/14/19 "Small amount of fluid within the left perirectal/mesorectal space, likely residual to prior infection, without significant evidence of a deep pelvic and perineal soft tissue infection as seen on prior exam of 8/23/2019  Residual focus of air seen along   the right inferior pubic rami, which may be ulcer related"  Evaluated by Urology and ID, apreciate recommendations  Scrotal ultrasound today to evaluate for infection, abscess around the implant  Patient is afebrile, WBC 7 4    Sepsis [facility])  Assessment & Plan  Sepsis--RESOLVED  09/14/2019 patient was admitted to [facility] for sepsis with elevated white blood cell count, temperature of 102F  Received fluid resuscitation and antibiotics    Patient is afebrile with white blood cell count 13 9-->9 4-->7 49  Continue antibiotics per ID  Monitor vitals    Patient was admitted 08/23/2019 with sepsis secondary to complicated UTI  Urine culture positive for E coli ESBL there was a concern for Fourniere's gangrene and infection of the penile implant  Gangrene was ruled out  Patient was recommended to have penile implant removed which he refused  Bacteremia  Assessment & Plan  One set of blood cultures taken 09/14/2019 growing GPC in clusters  Repeated blood cultures 9/17 pending    Paraplegic spinal paralysis [facility])  Assessment & Plan  Secondary to spinal cyst  Frequent repositioning      History of Clostridium difficile colitis  Assessment & Plan  Patient with a history of C diff  Per ID started on oral vancomycin prophylaxis  Monitor ostomy output    Stage IV pressure ulcer of sacral region [facility])  Assessment & Plan  Stage 4 pressure ulcer on the left distal buttock  Frequent repositioning  Wound Care on board    Colostomy in place [facility])  Assessment & Plan  Secondary to chronic sacral ulcers    Neurogenic bladder  Assessment & Plan  Solomon in place    Stage II pressure ulcer of buttock  Assessment & Plan  Stage II pressure ulcer on the right distal buttock, present on admission  Frequent repositioning  Wound Care on board    Opioid use  Assessment & Plan  PDMP reviewed  Continue Oxy 20 mg q 8h p r n      Diabetes mellitus type II, controlled [facility])  Assessment & Plan  Lab Results   Component Value Date    HGBA1C 5 2 05/06/2019       Recent Labs     09/15/19  0114 09/15/19  0603 09/15/19  1132   POCGLU 157* 97 95       Blood Sugar Average: Last 72 hrs:   diet controlled  Last hemoglobin A1c 05/06/2019 was 5 2  Continue to monitor    Anemia  Assessment & Plan  Baseline around 8 5-9  Hemoglobin today 7 6  No signs of active bleed  Continue to monitor, transfuse if less than 7  Continue ferrous sulfate and vitamin-C        VTE Pharmacologic Prophylaxis:   Pharmacologic: Enoxaparin (Lovenox)  Mechanical VTE Prophylaxis in Place: Yes    Patient Centered Rounds: I have performed bedside rounds with nursing staff today  Discussions with Specialists or Other Care Team Provider: Urology    Education and Discussions with Family / Patient: patient    Time Spent for Care: 30 minutes  More than 50% of total time spent on counseling and coordination of care as described above  Current Length of Stay: 1 day(s)    Current Patient Status: Inpatient   Certification Statement: The patient will continue to require additional inpatient hospital stay due to IV antibiotics and in further evaluation for penile implant infection    Discharge Plan:  Keep inpatient    Code Status: Level 1 - Full Code      Subjective:   Patient was seen and evaluated bedside this morning with nursing staff  Patient feeling well, denies any complaints  Objective:     Vitals:   Temp (24hrs), Av 7 °F (37 1 °C), Min:97 °F (36 1 °C), Max:100 9 °F (38 3 °C)    Temp:  [97 °F (36 1 °C)-100 9 °F (38 3 °C)] 97 9 °F (36 6 °C)  HR:  [] 105  Resp:  [16-18] 17  BP: ()/(53-73) 101/63  SpO2:  [95 %-100 %] 100 %  Body mass index is 23 58 kg/m²  Input and Output Summary (last 24 hours): Intake/Output Summary (Last 24 hours) at 2019 1314  Last data filed at 2019 0701  Gross per 24 hour   Intake 0 ml   Output 650 ml   Net -650 ml       Physical Exam:     Physical Exam   Constitutional: He is oriented to person, place, and time  No distress  Seems chronically ill   HENT:   Head: Normocephalic and atraumatic  Eyes:   Blind on the right eye   Neck: Normal range of motion  Neck supple  Right IJ   Cardiovascular: Normal rate, regular rhythm and normal heart sounds  Exam reveals no gallop and no friction rub  No murmur heard  Pulmonary/Chest: Breath sounds normal  No respiratory distress  He has no wheezes  Abdominal: Soft  Bowel sounds are normal  He exhibits no distension   There is no tenderness  Colostomy in place and patent   Genitourinary:   Genitourinary Comments: Solomon in place, superficial ulcerations on the scrotum   Musculoskeletal:   Right above-the-knee amputation   Neurological: He is alert and oriented to person, place, and time  No cranial nerve deficit  Skin: Skin is warm and dry  Midline back old surgical scar  Multiple old surgical scars in the sacrum and buttock area  Surgical scar in the right arm  Pressure ulcer in the left and right buttock   Psychiatric: He has a normal mood and affect  Additional Data:     Labs:    Results from last 7 days   Lab Units 09/17/19  0456   WBC Thousand/uL 7 49   HEMOGLOBIN g/dL 7 6*   HEMATOCRIT % 26 1*   PLATELETS Thousands/uL 412*   NEUTROS PCT % 68   LYMPHS PCT % 21   MONOS PCT % 9   EOS PCT % 2     Results from last 7 days   Lab Units 09/17/19  0456   SODIUM mmol/L 137   POTASSIUM mmol/L 3 6   CHLORIDE mmol/L 101   CO2 mmol/L 28   BUN mg/dL 7   CREATININE mg/dL 0 51*   ANION GAP mmol/L 8   CALCIUM mg/dL 9 0   ALBUMIN g/dL 1 6*   TOTAL BILIRUBIN mg/dL 0 21   ALK PHOS U/L 70   ALT U/L 18   AST U/L 21   GLUCOSE RANDOM mg/dL 87     Results from last 7 days   Lab Units 09/14/19  2201   INR  1 15*     Results from last 7 days   Lab Units 09/15/19  1132 09/15/19  0603 09/15/19  0114   POC GLUCOSE mg/dl 95 97 157*         Results from last 7 days   Lab Units 09/17/19  0456 09/15/19  0505 09/14/19  2201   LACTIC ACID mmol/L  --   --  0 8   PROCALCITONIN ng/ml 0 22 0 72* 0 76*           * I Have Reviewed All Lab Data Listed Above  * Additional Pertinent Lab Tests Reviewed: Olivia 66 Admission Reviewed    Imaging:    Imaging Reports Reviewed Today Include: all  Imaging Personally Reviewed by Myself Includes:  none    Recent Cultures (last 7 days):     Results from last 7 days   Lab Units 09/14/19 2202 09/14/19 2201 09/14/19  1943 09/13/19  1107   BLOOD CULTURE  Staphylococcus coagulase negative* No Growth at 24 hrs  --   --    GRAM STAIN RESULT  Gram positive cocci in clusters*  --   --   --    URINE CULTURE   --   --  20,000-29,000 cfu/ml Oxidase Positive gram negative jackson*  10,000-19,000 cfu/ml Gram Negative Jackson Enteric Like*  <10,000 cfu/ml Staphylococcus aureus*  10,000-19,000 cfu/ml Gamma Hemolytic Streptococcus >100,000 cfu/ml Escherichia coli ESBL*       Last 24 Hours Medication List:     Current Facility-Administered Medications:  acetaminophen 650 mg Oral Q6H PRN Odalis Walker PA-C   ascorbic acid 500 mg Oral Daily With Breakfast Raymundo Solano MD   aspirin 81 mg Oral Daily Maira Lazo MD   enoxaparin 40 mg Subcutaneous Daily Raymundo Solano MD   ferrous sulfate 325 mg Oral Daily With Breakfast Raymundo Solano MD   folic acid 916 mcg Oral Daily Raymundo Solano MD   nitrofurantoin 100 mg Oral BID With Meals KALPANA Rodriges   oxyCODONE 20 mg Oral Q8H PRN Raymundo Solano MD   saccharomyces boulardii 250 mg Oral BID Raymundo Solano MD   vancomycin 125 mg Oral Q12H William Camacho MD        Today, Patient Was Seen By: Raymundo Solano MD    ** Please Note: Dictation voice to text software may have been used in the creation of this document   **

## 2019-09-17 NOTE — ASSESSMENT & PLAN NOTE
Patient has a nonfunctional penile prosthesis  Possible infection of the penile prosthesis  Evaluated by Urology on the previous admission 08/23/2019, it was recommended for it to be removed, patient refused, wanted to do it as outpatient  CT abd 9/14/19 "Small amount of fluid within the left perirectal/mesorectal space, likely residual to prior infection, without significant evidence of a deep pelvic and perineal soft tissue infection as seen on prior exam of 8/23/2019  Residual focus of air seen along   the right inferior pubic rami, which may be ulcer related"  Evaluated by Urology and ID, apreciate recommendations  Scrotal ultrasound today to evaluate for infection, abscess around the implant    Patient is afebrile, WBC 7 4

## 2019-09-17 NOTE — ASSESSMENT & PLAN NOTE
Stage 4 pressure ulcer on the left distal buttock  Frequent repositioning  Wound Care on board normal...

## 2019-09-17 NOTE — PROGRESS NOTES
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)  Assessment & Plan  Currently implant in place, nonfunctioning  At high risk for erosion and an active infection, secondary to decubitus ulcer and recurrent UTI  I did offer him explant of this prosthesis during this hospitalization, but I do not feel it is actively infected or life-threatening at this time, he continues to wish to see Dr Dandre Cordova as an outpatient with scheduled elective removal of the implant  Today on exam, copious purulence noted  Discussed with ID - who examined and probed his sacral decubitus ulcer and found copious foul smelling purulence draining and probing deep to ?bone  In light of the above exam findings, recommend CT abd/pelvis for evaluation of fluid collection/abscess/sacral osteomyelitis and evaluation for infection of penile prosthesis  Continue to recommend explant to the patient - as I am very concerned that if this is a deep abscess, it could be communicating with and infecting his implant  * Complicated urinary tract infection  Assessment & Plan  Neurogenic bladder with bladder outlet obstruction, CIC at home  Difficulty passing catheter from CIC a home several days before presenting to the hospital   Solomon catheter decompression  Current coverage ceftriaxone  Gram-positive bacteremia  Infectious Disease following  Currently decompressed with Solomon catheter, recommend maintaining for now  Possibly long-term or indefinitely  Definitely until can be seen as an outpatient with plan for penile prosthesis explant at an interval     I did also offer him suprapubic catheter, but he is comfortable with the Solomon catheter, as such we should maintain Solomon catheter to gravity drainage upon discharge and through visit with Dr Dandre Cordova    Consider exchanging Solomon catheter prior to discharge  Bedside rounds performed with ALFREDO Chau  Discussed with [facility] with ID, Dr Ruby Preciado, Dr Beatriz Donnelly  Subjective/Objective     Subjective:   Arabella Archer feels well today, reports feeling much better than yesterday  Denies any problems with the Solomon catheter  Continues to complain of pain in his sacrum  Review of Systems   Constitutional: Negative for activity change and appetite change  HENT: Negative for congestion and ear pain  Eyes: Negative for pain  Respiratory: Negative for cough and shortness of breath  Cardiovascular: Negative for chest pain and palpitations  Gastrointestinal: Negative for abdominal distention, abdominal pain, blood in stool, constipation, diarrhea and nausea  Genitourinary: Negative for difficulty urinating, dysuria, flank pain, hematuria, penile pain, penile swelling, scrotal swelling and testicular pain  Musculoskeletal: Positive for back pain (Lower back/buttock/sacral pain)  Negative for arthralgias and myalgias  Skin: Negative for rash  Allergic/Immunologic: Negative for immunocompromised state  Neurological: Negative for dizziness and headaches  Hematological: Negative for adenopathy  Does not bruise/bleed easily  Psychiatric/Behavioral: Negative for agitation  The patient is not nervous/anxious  Objective:  Vitals: Blood pressure 101/63, pulse 105, temperature 97 9 °F (36 6 °C), temperature source Tympanic, resp  rate 17, height 5' 7" (1 702 m), weight 68 3 kg (150 lb 9 2 oz), SpO2 100 %  ,Body mass index is 23 58 kg/m²      Intake/Output Summary (Last 24 hours) at 9/17/2019 1401  Last data filed at 9/17/2019 0701  Gross per 24 hour   Intake 0 ml   Output 650 ml   Net -650 ml     Invasive Devices     Central Venous Catheter Line            CVC Central Lines 09/14/19 Triple Right Subclavian 2 days          Drain            Colostomy Descending/sigmoid LLQ -- days    Urethral Catheter Non-latex 16 Fr  2 days Physical Exam   Constitutional: He appears well-developed and well-nourished  He is cooperative  He does not appear ill  No distress  HENT:   Head: Normocephalic and atraumatic  Moist mucous membranes  Eyes: Conjunctivae and EOM are normal    Neck: Normal range of motion  Neck supple  No tracheal deviation present  Cardiovascular: Normal rate, regular rhythm and normal heart sounds  No murmur heard  Pulmonary/Chest: Effort normal and breath sounds normal  No respiratory distress  He has no wheezes  Good airflow bilaterally on deep inspiration  Abdominal: Soft  Bowel sounds are normal  He exhibits no distension and no mass  There is no tenderness  Genitourinary:   Genitourinary Comments: Scrotum found to be sitting in a pool of purulent draiange, still with superficial redness and wounds/skin breakdown on scrotum  Penile pump palpable without evidence of ulceration  I believe this purulence is likely from his sacral decubitus ulcer, but could be from scrotum or from urethra - less likely urethra, as urine is clear yellow  Sacral wounds found to have saturated dressings upon rolling patient  Musculoskeletal: He exhibits no edema  Right lower extremity status post disarticulation surgery   Skin: No rash noted  He is not diaphoretic  No erythema  No pallor  Marysue Jose Elias is garcia purulence foul-smelling drainage noted underneath scrotum   Psychiatric: He has a normal mood and affect  His behavior is normal  Judgment and thought content normal    Nursing note and vitals reviewed        History:    Past Medical History:   Diagnosis Date    Anemia     Blind     r eye    Chronic cystitis     Colostomy in place [facility])     Detrusor sphincter dyssynergia     Diabetes mellitus ([facility] [address] )     Poorly controlled type 2; Last Assessed:  3/18/14    Erectile dysfunction     Frequency of urination     GERD (gastroesophageal reflux disease)     History of diabetes mellitus     History of osteomyelitis  Hx of leg amputation (HCC)     r high upper leg    Hyperlipidemia     Hypertension     Hypospadias     Incomplete bladder emptying     Neurogenic bladder     Paralysis (HCC)     Paraplegia (HCC)     Spinal cord cysts     Ulcer of sacral region [facility])     Urge incontinence      Past Surgical History:   Procedure Laterality Date    AMPUTATION      At hip; Last Assessed:  1/19/16    BLADDER SURGERY      COLON SURGERY      llq ostomy pouch    COLOSTOMY      COMPLEX CYSTOMETROGRAM  2014    CYSTOSCOPY  2014    LEG AMPUTATION      MEATOTOMY      PENILE PROSTHESIS IMPLANT  2011    MA ADJ TISS XFER SCALP,EXTREM 10 1-30 SQCM Left 5/1/2017    Procedure: POSTERIOR THIGH V-Y ADMANCEMENT;  Surgeon: Lisa Ho MD;  [facility];  Service: Plastics    MA MUSCLE-SKIN FLAP,TRUNK Left 5/1/2017    Procedure: FLAP CLOSURE LEFT ISCHIAL WOUND and "RIGHT" ISCHIAL FLAP ADVANCEMENT * DEBRIDEMENT, [place] ;  Surgeon: Lisa Ho MD;  [facility];  Service: Plastics    MA MUSCLE-SKIN FLAP,TRUNK Left 9/27/2017    Procedure: gluteal myocutaneous rotational flap, posterior thigh v to y advancement- wound 5 x 2 5 x 8;  Surgeon: Lisa Ho MD;  [facility];  Service: Plastics    SPINE SURGERY      Lower back    UROFLOWMETRY SIMPLE / COMPLEX  2014     Family History   Problem Relation Age of Onset    No Known Problems Mother     No Known Problems Father      Social History     Socioeconomic History    Marital status: /Civil Union     Spouse name: Not on file    Number of children: Not on file    Years of education: Not on file    Highest education level: Not on file   Occupational History    Not on file   Social Needs    Financial resource strain: Not on file    Food insecurity:     Worry: Not on file     Inability: Not on file    Transportation needs:     Medical: Not on file     Non-medical: Not on file   Tobacco Use    Smoking status: Former Smoker     Packs/day: 0 50 Years: 10 00     Pack years: 5 00     Last attempt to quit:      Years since quittin 7    Smokeless tobacco: Never Used    Tobacco comment: Onset date:  11/10/17   Substance and Sexual Activity    Alcohol use: Never     Frequency: Never     Comment: Per [employer]:  Social drinker (Onset date:  11/10/17)    Drug use: No    Sexual activity: Not on file   Lifestyle    Physical activity:     Days per week: Not on file     Minutes per session: Not on file    Stress: Not on file   Relationships    Social connections:     Talks on phone: Not on file     Gets together: Not on file     Attends Orthodoxy service: Not on file     Active member of club or organization: Not on file     Attends meetings of clubs or organizations: Not on file     Relationship status: Not on file    Intimate partner violence:     Fear of current or ex partner: Not on file     Emotionally abused: Not on file     Physically abused: Not on file     Forced sexual activity: Not on file   Other Topics Concern    Not on file   Social History Narrative    [facility] language [address] [place] [address] Dr Ontiveros    Social history reviewed, unchanged       Labs:  Recent Labs     09/14/19  2201 09/15/19  0505 19  0458 19  0456   WBC 13 90* 10 30 9 40 7 49     Recent Labs     09/14/19  2201 09/15/19  0505 19  0458 19  0456   HGB 8 7* 8 3* 7 9* 7 6*       Recent Labs     09/14/19  2201 09/15/19  0505 19  0458 19  0456   CREATININE 0 50* 0 42* 0 42* 0 51*     Oksana Ladd PA-C  Date: 2019 Time: 2:01 PM

## 2019-09-18 PROBLEM — A41.9 SEPSIS (HCC): Status: RESOLVED | Noted: 2019-09-16 | Resolved: 2019-09-18

## 2019-09-18 PROBLEM — L89.323 DECUBITUS ULCER OF LEFT PERINEAL ISCHIAL REGION, STAGE 3 (HCC): Status: ACTIVE | Noted: 2019-09-16

## 2019-09-18 PROBLEM — L02.91 ABSCESS: Status: ACTIVE | Noted: 2019-09-18

## 2019-09-18 LAB
ANION GAP SERPL CALCULATED.3IONS-SCNC: 8 MMOL/L (ref 4–13)
BACTERIA BLD CULT: ABNORMAL
BACTERIA UR CULT: ABNORMAL
BASOPHILS # BLD AUTO: 0.04 THOUSANDS/ΜL (ref 0–0.1)
BASOPHILS NFR BLD AUTO: 0 % (ref 0–1)
BUN SERPL-MCNC: 9 MG/DL (ref 5–25)
CALCIUM SERPL-MCNC: 9.3 MG/DL (ref 8.3–10.1)
CHLORIDE SERPL-SCNC: 102 MMOL/L (ref 100–108)
CO2 SERPL-SCNC: 30 MMOL/L (ref 21–32)
CREAT SERPL-MCNC: 0.46 MG/DL (ref 0.6–1.3)
EOSINOPHIL # BLD AUTO: 0.21 THOUSAND/ΜL (ref 0–0.61)
EOSINOPHIL NFR BLD AUTO: 2 % (ref 0–6)
ERYTHROCYTE [DISTWIDTH] IN BLOOD BY AUTOMATED COUNT: 16.5 % (ref 11.6–15.1)
GFR SERPL CREATININE-BSD FRML MDRD: 124 ML/MIN/1.73SQ M
GLUCOSE SERPL-MCNC: 87 MG/DL (ref 65–140)
GRAM STN SPEC: ABNORMAL
HCT VFR BLD AUTO: 29.1 % (ref 36.5–49.3)
HCV AB SER QL: NORMAL
HGB BLD-MCNC: 8.6 G/DL (ref 12–17)
IMM GRANULOCYTES # BLD AUTO: 0.04 THOUSAND/UL (ref 0–0.2)
IMM GRANULOCYTES NFR BLD AUTO: 0 % (ref 0–2)
LYMPHOCYTES # BLD AUTO: 1.61 THOUSANDS/ΜL (ref 0.6–4.47)
LYMPHOCYTES NFR BLD AUTO: 18 % (ref 14–44)
MCH RBC QN AUTO: 23.7 PG (ref 26.8–34.3)
MCHC RBC AUTO-ENTMCNC: 29.6 G/DL (ref 31.4–37.4)
MCV RBC AUTO: 80 FL (ref 82–98)
MONOCYTES # BLD AUTO: 0.62 THOUSAND/ΜL (ref 0.17–1.22)
MONOCYTES NFR BLD AUTO: 7 % (ref 4–12)
NEUTROPHILS # BLD AUTO: 6.6 THOUSANDS/ΜL (ref 1.85–7.62)
NEUTS SEG NFR BLD AUTO: 73 % (ref 43–75)
NRBC BLD AUTO-RTO: 0 /100 WBCS
PLATELET # BLD AUTO: 499 THOUSANDS/UL (ref 149–390)
PMV BLD AUTO: 9.7 FL (ref 8.9–12.7)
POTASSIUM SERPL-SCNC: 3.6 MMOL/L (ref 3.5–5.3)
RBC # BLD AUTO: 3.63 MILLION/UL (ref 3.88–5.62)
SODIUM SERPL-SCNC: 140 MMOL/L (ref 136–145)
WBC # BLD AUTO: 9.12 THOUSAND/UL (ref 4.31–10.16)

## 2019-09-18 PROCEDURE — 80048 BASIC METABOLIC PNL TOTAL CA: CPT | Performed by: INTERNAL MEDICINE

## 2019-09-18 PROCEDURE — 85025 COMPLETE CBC W/AUTO DIFF WBC: CPT | Performed by: INTERNAL MEDICINE

## 2019-09-18 PROCEDURE — 86803 HEPATITIS C AB TEST: CPT | Performed by: INTERNAL MEDICINE

## 2019-09-18 PROCEDURE — NC001 PR NO CHARGE: Performed by: PHYSICIAN ASSISTANT

## 2019-09-18 PROCEDURE — 99233 SBSQ HOSP IP/OBS HIGH 50: CPT | Performed by: INTERNAL MEDICINE

## 2019-09-18 PROCEDURE — 0T9B70Z DRAINAGE OF BLADDER WITH DRAINAGE DEVICE, VIA NATURAL OR ARTIFICIAL OPENING: ICD-10-PCS | Performed by: INTERNAL MEDICINE

## 2019-09-18 PROCEDURE — 99232 SBSQ HOSP IP/OBS MODERATE 35: CPT | Performed by: UROLOGY

## 2019-09-18 PROCEDURE — 99222 1ST HOSP IP/OBS MODERATE 55: CPT | Performed by: PHYSICIAN ASSISTANT

## 2019-09-18 RX ORDER — VANCOMYCIN HYDROCHLORIDE 1 G/200ML
15 INJECTION, SOLUTION INTRAVENOUS EVERY 12 HOURS
Status: DISCONTINUED | OUTPATIENT
Start: 2019-09-18 | End: 2019-09-18 | Stop reason: DRUGHIGH

## 2019-09-18 RX ORDER — POTASSIUM CHLORIDE 20 MEQ/1
20 TABLET, EXTENDED RELEASE ORAL ONCE
Status: COMPLETED | OUTPATIENT
Start: 2019-09-18 | End: 2019-09-18

## 2019-09-18 RX ADMIN — POTASSIUM CHLORIDE 20 MEQ: 1500 TABLET, EXTENDED RELEASE ORAL at 09:38

## 2019-09-18 RX ADMIN — PIPERACILLIN AND TAZOBACTAM 4.5 G: 4; .5 INJECTION, POWDER, LYOPHILIZED, FOR SOLUTION INTRAVENOUS at 20:46

## 2019-09-18 RX ADMIN — Medication 250 MG: at 17:08

## 2019-09-18 RX ADMIN — ASPIRIN 81 MG: 81 TABLET, COATED ORAL at 09:38

## 2019-09-18 RX ADMIN — ENOXAPARIN SODIUM 40 MG: 40 INJECTION SUBCUTANEOUS at 09:38

## 2019-09-18 RX ADMIN — OXYCODONE HYDROCHLORIDE AND ACETAMINOPHEN 500 MG: 500 TABLET ORAL at 09:38

## 2019-09-18 RX ADMIN — Medication 125 MG: at 00:00

## 2019-09-18 RX ADMIN — ACETAMINOPHEN 650 MG: 325 TABLET ORAL at 00:57

## 2019-09-18 RX ADMIN — OXYCODONE HYDROCHLORIDE 20 MG: 10 TABLET ORAL at 06:24

## 2019-09-18 RX ADMIN — Medication 125 MG: at 20:40

## 2019-09-18 RX ADMIN — OXYCODONE HYDROCHLORIDE 20 MG: 10 TABLET ORAL at 17:25

## 2019-09-18 RX ADMIN — FERROUS SULFATE TAB 325 MG (65 MG ELEMENTAL FE) 325 MG: 325 (65 FE) TAB at 09:38

## 2019-09-18 RX ADMIN — FOLIC ACID 500 MCG: 1 TABLET ORAL at 09:39

## 2019-09-18 RX ADMIN — VANCOMYCIN HYDROCHLORIDE 1250 MG: 5 INJECTION, POWDER, LYOPHILIZED, FOR SOLUTION INTRAVENOUS at 13:17

## 2019-09-18 RX ADMIN — Medication 250 MG: at 09:38

## 2019-09-18 RX ADMIN — NITROFURANTOIN (MONOHYDRATE/MACROCRYSTALS) 100 MG: 75; 25 CAPSULE ORAL at 09:38

## 2019-09-18 RX ADMIN — Medication 125 MG: at 09:47

## 2019-09-18 NOTE — PROGRESS NOTES
Progress Note - Infectious Disease   Anna Farooqcat 63 y o  male MRN: [id number]  Unit/Bed#: E2 -01 Encounter: [id number]      Impression/Plan:  1  Sepsis  POA  Fever, tachycardia, leukocytosis  Likely secondary to complicated urinary tract infection in the setting of patient with urinary retention  Urine culture unfortunately showed fairly resistant polymicrobial results of Pseudomonas aeruginosa, E coli ESBL, MRSA, Enterococcus faecalis, and alpha hemolytic Streptococcus  Kidneys did not appear abnormal on patient's initial CT scan  Now patient with increased discharge from his chronic sacral ulcer  New CT of the abdomen and pelvis was concerning for a peritoneal abscess  There is also concern that his decubitus ulcer is extending anteriorly into the perineum and urethra around his penile prosthesis  Patient antibiotic coverage will be broadened  I suspect he will require surgical debridement of the wound, drainage of the abscess, and immediate removal of his penile prosthesis  Without source control the patient's bacterial infection will likely continue to progress and he may develop additional antibiotic resistance   -antibiotics as below  -check CBCD and BMP tomorrow  -follow up repeat blood cultures  -monitor vitals  -supportive care     2  Complicated urinary tract infection  Likely related to patient having difficulty with self catheterization  Also consider possible communication of his sacral wound and urethra  New CT imaging was concerning for an abscess and extending ulceration towards the perineum and urethra  Urine culture unfortunately showed fairly resistant polymicrobial results of Pseudomonas aeruginosa, E coli ESBL, MRSA, Enterococcus faecalis, and alpha hemolytic Streptococcus  Patient is now status post placement of a Solomon catheter but given new imaging I recommend he pursue a suprapubic tube  Patient has been tolerating PO Macrobid without difficulty    However, given his final urine culture findings we will need to broaden his antibiotic coverage now   -stop Macrobid  -start IV Zosyn  -start IV vancomycin  -consult pharmacy for guidance on vancomycin dosage  -check CBCD and BMP tomorrow  -monitor vitals  -monitor urine output  -serial Solomon catheter care and exams  -continue close follow-up with Urology     3  Coag-negative Staphylococcus bacteremia  Showing in one set of his initial blood cultures  I suspect this is representative of skin contamination and not in active bacteremia  Patient is clinically stable without fever or leukocytosis  Repeat blood cultures are negative after 24 hours   -no indication for antibiotics for this issue  -monitor CBC and BMP  -follow up repeat blood cultures  -monitor vitals     4  Chronic sacral ulcer  Chronically probes to bone  Now with development of an abscess and concern that wound may be communicating with perineum and urethra  Concern penile implant may be a source of bacteria as well  Patient will likely need debridement of his wound and drainage of the abscess  I recommend removing the penile implant at this time as his bacterial infection will likely progress if there is not adequate source control  A general surgery consult is pending   -serial wound exams  -local wound care per wound management team  -continue close follow-up with wound management  -follow-up general surgery consult     5  Penile implant  Implant is not functioning  It may be contributing to his aggressive bacterial infection above  I recommend removing the penile implant as soon as possible as he will likely have ongoing bacterial infections if there is not adequate source control   -continue inpatient Urology follow-up  -continue outpatient follow-up with Urology     6  Scrotal excoriations  Patient with multiple superficial erythematous excoriations on the base of the scrotum as well as along the penis    These areas have a base of pink fibrous tissue and are not weeping  Patient unable to feel pain due to his paraplegia  Scrotum has no palpable fluctuance  He is following closely with wound management   -serial scrotum exams  -continue close follow-up with wound management     7  Paraplegia    -supportive care      8  History of C diff   Patient noted with prior history of C diff which is not documented in our system   He denies having any increased to his ostomy output  He has no ongoing abdominal pain or cramping  He is receiving PO Vancomycin for prophylaxis and is tolerating without difficulty  -continue PO vancomycin prophylaxis  -monitor abdominal symptoms  -monitor stool output in ostomy bag    Above plan was discussed in detail with patient at the bedside  Above plan was discussed in detail with Urology Karrie BACA  Above plan was discussed in detail with surgical Negin BACA  Above plan was discussed in detail with wound care RN, Lucas Fisher  Antibiotics:  Macrobid - stop  Vancomycin 1  Zosyn 1  Antibiotics 5    Subjective:  Patient tells me that he is continuing to feel well  He has no acute complaints  Denies pain in and around his Solomon catheter  He does tell me that he notices urine leaking from the tip of the penis at times  He has no pain in his sacral wound and reports his dressings have remained intact overnight  He denies fever, chills, sweats, shakes; no nausea, vomiting, abdominal pain; no concerns with his ostomy pouch; no cough, shortness of breath, or chest pain  No new symptoms  Objective:  Vitals:  Temp:  [97 8 °F (36 6 °C)-100 8 °F (38 2 °C)] 98 6 °F (37 °C)  HR:  [] 86  Resp:  [18] 18  BP: ()/(61-77) 112/70  SpO2:  [95 %-100 %] 95 %  Temp (24hrs), Av 8 °F (37 1 °C), Min:97 8 °F (36 6 °C), Max:100 8 °F (38 2 °C)  Current: Temperature: 98 6 °F (37 °C)    Physical Exam:   General Appearance:  Alert, interactive, nontoxic, no acute distress  Patient appears chronically debilitated     Throat: Oropharynx moist without lesions  Lungs:   Clear to auscultation bilaterally; no wheezes, rhonchi or rales; respirations unlabored   Heart:  RRR; no murmur, rub or gallop   Abdomen:   Soft, non-tender, non-distended, positive bowel sounds; ostomy pouch intact, stool output is thin and brown     Back: Did not roll patient to examine as wound management was about to perform full assessment and dressing change   Genitourinary: Solomon catheter intact, urine output is yellow without sediment, no drainage noted tip of penis; patient's scrotum and penis have scattered open ulcerations with pink fibrous base, no noted purulence or palpable fluctuance   Extremities: No clubbing or cyanosis; no LLE edema; R BKA stump healed  Skin: No new rashes or lesions noted on exposed skin  Labs, Imaging, & Other studies:   All pertinent labs and imaging studies were personally reviewed  Results from last 7 days   Lab Units 09/18/19  0615 09/17/19  0456 09/16/19  0458   WBC Thousand/uL 9 12 7 49 9 40   HEMOGLOBIN g/dL 8 6* 7 6* 7 9*   PLATELETS Thousands/uL 499* 412* 419     Results from last 7 days   Lab Units 09/18/19  0615 09/17/19  0456  09/14/19  2201   POTASSIUM mmol/L 3 6 3 6   < > 3 3*   CHLORIDE mmol/L 102 101   < > 98   CO2 mmol/L 30 28   < > 27   BUN mg/dL 9 7   < > 18   CREATININE mg/dL 0 46* 0 51*   < > 0 50*   EGFR ml/min/1 73sq m 124 118   < > 119   CALCIUM mg/dL 9 3 9 0   < > 8 4   AST U/L  --  21  --  19   ALT U/L  --  18  --  8*   ALK PHOS U/L  --  70  --  95    < > = values in this interval not displayed  Results from last 7 days   Lab Units 09/17/19  0459 09/17/19  0349 09/16/19  1000 09/14/19  2202 09/14/19  2201 09/14/19  1943 09/13/19  1107   BLOOD CULTURE  No Growth at 24 hrs  No Growth at 24 hrs  No Growth at 24 hrs   Staphylococcus coagulase negative* No Growth at 48 hrs   --   --    GRAM STAIN RESULT   --   --   --  Gram positive cocci in clusters*  --   --   --    URINE CULTURE   --   --   --   -- --  20,000-29,000 cfu/ml Pseudomonas aeruginosa*  10,000-19,000 cfu/ml Escherichia coli ESBL*  <10,000 cfu/ml Methicillin Resistant Staphylococcus aureus*  10,000-19,000 cfu/ml Enterococcus faecalis*  40,000-49,000 cfu/ml Alpha Hemolytic Streptococcus NOT Enterococcus* >100,000 cfu/ml Escherichia coli ESBL*     IMAGING:  CT ABDOMEN PELVIS W CONTRAST 9/17/19 - I personally reviewed this study which was concerning for a fluid abscess to the left of the distal sigmoid colon near the rectum  There is a new perineal fluid collection with air in the perineum near the urethra  Unclear if this communicates with his penile implant  His large decubital ulcer is now extending anteriorly  Chronic osteomyelitis of the left pubic bone and right acetabulum is stable

## 2019-09-18 NOTE — ASSESSMENT & PLAN NOTE
Baseline around 8 5-9  Hemoglobin today 8 6  No signs of active bleed  Continue to monitor, transfuse if less than 7  Continue ferrous sulfate and vitamin-C

## 2019-09-18 NOTE — ASSESSMENT & PLAN NOTE
Possible urinary tract infection secondary to neurogenic bladder/urinary retention/self catheterizations/bladder outlet obstruction   Urine culture from 09/13/2019 growing E coli ESBL  Repeat urine culture from 09/14/2019 coagulase negative Staph  Evaluated by Infectious Disease, appreciate recommendations  Patient was transferred from [facility] on Ceftriaxone  ID changed ceftriaxone to [facility] 9/16/19  Due to new finding of abscess, antibiotics changed to Vancomycin and Zosyn    Continue Solomon

## 2019-09-18 NOTE — ASSESSMENT & PLAN NOTE
CT showed "Left-sided decubitus ulcer extending to the left pubic bone/fascia and into the peritoneum unchanged in size from the prior studies  There is an abscess adjacent to the peritoneal component measuring 4 8 x 2 4 x 4 8 cm "  General surgery on board, appreciate the recommendations  Plan for possible debridement and I&D  Also implant removal per Urology  Per ID broaden antibiotics to Vanc and Zosyn  White blood cell count 9 212  Past 24 hours he spiked fever once to 100 8F    Continue to monitor

## 2019-09-18 NOTE — PLAN OF CARE
Problem: Potential for Falls  Goal: Patient will remain free of falls  Description  INTERVENTIONS:  - Assess patient frequently for physical needs  -  Identify cognitive and physical deficits and behaviors that affect risk of falls  -  [place] fall precautions as indicated by assessment   - Educate patient/family on patient safety including physical limitations  - Instruct patient to call for assistance with activity based on assessment  - Modify environment to reduce risk of injury  - Consider OT/PT consult to assist with strengthening/mobility  Outcome: Progressing     Problem: Prexisting or High Potential for Compromised Skin Integrity  Goal: Skin integrity is maintained or improved  Description  INTERVENTIONS:  - Identify patients at risk for skin breakdown  - Assess and monitor skin integrity  - Assess and monitor nutrition and hydration status  - Monitor labs   - Assess for incontinence   - Turn and reposition patient  - Assist with mobility/ambulation  - Relieve pressure over bony prominences  - Avoid friction and shearing  - Provide appropriate hygiene as needed including keeping skin clean and dry  - Evaluate need for skin moisturizer/barrier cream  - Collaborate with interdisciplinary team   - Patient/family teaching  - Consider wound care consult   Outcome: Progressing     Problem: Nutrition/Hydration-ADULT  Goal: Nutrient/Hydration intake appropriate for improving, restoring or maintaining nutritional needs  Description  Monitor and assess patient's nutrition/hydration status for malnutrition  Collaborate with interdisciplinary team and initiate plan and interventions as ordered  Monitor patient's weight and dietary intake as ordered or per policy  Utilize nutrition screening tool and intervene as necessary  Determine patient's food preferences and provide high-protein, high-caloric foods as appropriate       INTERVENTIONS:  - Monitor oral intake, urinary output, labs, and treatment plans  - Assess nutrition and hydration status and recommend course of action  - Evaluate amount of meals eaten  - Assist patient with eating if necessary   - Allow adequate time for meals  - Recommend/ encourage appropriate diets, oral nutritional supplements, and vitamin/mineral supplements  - Order, calculate, and assess calorie counts as needed  - Recommend, monitor, and adjust tube feedings and TPN/PPN based on assessed needs  - Assess need for intravenous fluids  - Provide specific nutrition/hydration education as appropriate  - Include patient/family/caregiver in decisions related to nutrition  Outcome: Progressing

## 2019-09-18 NOTE — PROGRESS NOTES
Progress Note - Leandro Asa 1960, 63 y o  male MRN: [id number]    Unit/Bed#: E2 -01 Encounter: [id number]    Primary Care Provider: Bette Garcia MD   Date and time admitted to hospital: 9/16/2019 11:44 AM        * Complicated urinary tract infection  Assessment & Plan  Possible urinary tract infection secondary to neurogenic bladder/urinary retention/self catheterizations/bladder outlet obstruction   Urine culture from 09/13/2019 growing E coli ESBL  Repeat urine culture from 09/14/2019 coagulase negative Staph  Evaluated by Infectious Disease, appreciate recommendations  Patient was transferred from [facility] on Ceftriaxone  ID changed ceftriaxone to [address] 9/16/19  Due to new finding of abscess, antibiotics changed to Vancomycin and Zosyn  Continue Solomon      Abscess  Assessment & Plan  CT showed "Left-sided decubitus ulcer extending to the left pubic bone/fascia and into the peritoneum unchanged in size from the prior studies  There is an abscess adjacent to the peritoneal component measuring 4 8 x 2 4 x 4 8 cm "  General surgery on board, appreciate the recommendations  Plan for possible debridement and I&D  Also implant removal per Urology  Per ID broaden antibiotics to Vanc and Zosyn  White blood cell count 9 212  Past 24 hours he spiked fever once to 100 8F  Continue to monitor       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[facility])  Assessment & Plan  Patient has a nonfunctional penile prosthesis  Possible infection of the penile prosthesis  Evaluated by Urology on the previous admission 08/23/2019, it was recommended for it to be removed, patient refused, wanted to do it as outpatient  CT abd 9/14/19 "Small amount of fluid within the left perirectal/mesorectal space, likely residual to prior infection, without significant evidence of a deep pelvic and perineal soft tissue infection as seen on prior exam of 8/23/2019    Residual focus of air seen along the right inferior pubic rami, which may be ulcer related"  Repeat CT yesterday showed an abscess which is in close proximity to the penile implant  Plan is to be removed during this admission  Continue Vanc and Zosyn    Bacteremia  Assessment & Plan  One set of blood cultures taken 09/14/2019 growing coagulase-negative Staph, most likely contaminate  Repeated blood cultures 9/17 negative so far    Paraplegic spinal paralysis [facility])  Assessment & Plan  Secondary to spinal cyst  Frequent repositioning      History of Clostridium difficile colitis  Assessment & Plan  Patient with a history of C diff  Per ID continue oral vancomycin prophylaxis  Monitor ostomy output    Stage IV pressure ulcer of sacral region [facility])  Assessment & Plan  Stage 4 pressure ulcer on the left distal buttock  The packing saturated with purulent material  Frequent repositioning  Wound Care on board    Colostomy in place [facility])  Assessment & Plan  Secondary to chronic sacral ulcers    Neurogenic bladder  Assessment & Plan  Solomon in place    Stage II pressure ulcer of buttock  Assessment & Plan  Stage II pressure ulcer on the right distal buttock, present on admission  Frequent repositioning  Wound Care on board    Opioid use  Assessment & Plan  PDMP reviewed  Continue Oxy 20 mg q 8h p r n  Diabetes mellitus type II, controlled [facility])  Assessment & Plan  Lab Results   Component Value Date    HGBA1C 5 2 05/06/2019       No results for input(s): POCGLU in the last 72 hours      Blood Sugar Average: Last 72 hrs:   diet controlled  Last hemoglobin A1c 05/06/2019 was 5 2  Continue to monitor    Anemia  Assessment & Plan  Baseline around 8 5-9  Hemoglobin today 8 6  No signs of active bleed  Continue to monitor, transfuse if less than 7  Continue ferrous sulfate and vitamin-C      VTE Pharmacologic Prophylaxis:   Pharmacologic: Enoxaparin (Lovenox)  Mechanical VTE Prophylaxis in Place: Yes    Patient Centered Rounds: I have performed bedside rounds with nursing staff today     Discussions with Specialists or Other Care Team Provider: Urology, Surgery, ID    Education and Discussions with Family / Patient: patient    Time Spent for Care: 40  More than 50% of total time spent on counseling and coordination of care as described above  Current Length of Stay: 2 day(s)    Current Patient Status: Inpatient   Certification Statement: The patient will continue to require additional inpatient hospital stay due to pending debridement I and D per surgery and implant removal per Urology    Discharge Plan:  Keep inpatient    Code Status: Level 1 - Full Code      Subjective:   Patient was seen and examined this morning at bedside  He is feeling fine, has no concerns  Discussed the abscess finding on a CT  Patient agreed for surgical intervention if indicated and also agreed to remove the penile implant on this admission  Patient has no complaints  Objective:     Vitals:   Temp (24hrs), Av 8 °F (37 1 °C), Min:97 8 °F (36 6 °C), Max:100 8 °F (38 2 °C)    Temp:  [97 8 °F (36 6 °C)-100 8 °F (38 2 °C)] 98 6 °F (37 °C)  HR:  [] 86  Resp:  [18] 18  BP: ()/(61-77) 112/70  SpO2:  [95 %-100 %] 95 %  Body mass index is 23 58 kg/m²  Input and Output Summary (last 24 hours): Intake/Output Summary (Last 24 hours) at 2019 1440  Last data filed at 2019 0705  Gross per 24 hour   Intake    Output 2250 ml   Net -2250 ml       Physical Exam:     Physical Exam   Constitutional: He is oriented to person, place, and time  Seems chronically ill, no acute distress   HENT:   Head: Normocephalic and atraumatic  Eyes:   Blind in the right eye   Neck: Normal range of motion  Cardiovascular: Normal rate, regular rhythm and normal heart sounds  Exam reveals no gallop and no friction rub  No murmur heard  Pulmonary/Chest: Effort normal and breath sounds normal  No respiratory distress  He has no wheezes  Abdominal: Soft   Bowel sounds are normal  He exhibits no distension  There is no tenderness  There is no rebound and no guarding  Colostomy in place   Genitourinary:   Genitourinary Comments: Solomon in place   Musculoskeletal:   Right above-the-knee amputation   Neurological: He is alert and oriented to person, place, and time  Skin:   Multiple old scars in the sacral and buttock area as, midline vertebral scar  Right IJ  Sacral wound   Psychiatric: He has a normal mood and affect  Additional Data:     Labs:    Results from last 7 days   Lab Units 09/18/19  0615   WBC Thousand/uL 9 12   HEMOGLOBIN g/dL 8 6*   HEMATOCRIT % 29 1*   PLATELETS Thousands/uL 499*   NEUTROS PCT % 73   LYMPHS PCT % 18   MONOS PCT % 7   EOS PCT % 2     Results from last 7 days   Lab Units 09/18/19  0615 09/17/19  0456   SODIUM mmol/L 140 137   POTASSIUM mmol/L 3 6 3 6   CHLORIDE mmol/L 102 101   CO2 mmol/L 30 28   BUN mg/dL 9 7   CREATININE mg/dL 0 46* 0 51*   ANION GAP mmol/L 8 8   CALCIUM mg/dL 9 3 9 0   ALBUMIN g/dL  --  1 6*   TOTAL BILIRUBIN mg/dL  --  0 21   ALK PHOS U/L  --  70   ALT U/L  --  18   AST U/L  --  21   GLUCOSE RANDOM mg/dL 87 87     Results from last 7 days   Lab Units 09/14/19  2201   INR  1 15*     Results from last 7 days   Lab Units 09/15/19  1132 09/15/19  0603 09/15/19  0114   POC GLUCOSE mg/dl 95 97 157*         Results from last 7 days   Lab Units 09/17/19  0456 09/15/19  0505 09/14/19  2201   LACTIC ACID mmol/L  --   --  0 8   PROCALCITONIN ng/ml 0 22 0 72* 0 76*           * I Have Reviewed All Lab Data Listed Above  * Additional Pertinent Lab Tests Reviewed: Olivia 67 Admission Reviewed    Imaging:    Imaging Reports Reviewed Today Include: all  Imaging Personally Reviewed by Myself Includes:  none    Recent Cultures (last 7 days):     Results from last 7 days   Lab Units 09/17/19  0459 09/17/19  0349 09/16/19  1000 09/14/19  2202 09/14/19  2201 09/14/19  1943 09/13/19  1107   BLOOD CULTURE  No Growth at 24 hrs  No Growth at 24 hrs   No Growth at 24 hrs  Staphylococcus coagulase negative* No Growth at 72 hrs   --   --    GRAM STAIN RESULT   --   --   --  Gram positive cocci in clusters*  --   --   --    URINE CULTURE   --   --   --   --   --  20,000-29,000 cfu/ml Pseudomonas aeruginosa*  10,000-19,000 cfu/ml Escherichia coli ESBL*  <10,000 cfu/ml Methicillin Resistant Staphylococcus aureus*  10,000-19,000 cfu/ml Enterococcus faecalis*  40,000-49,000 cfu/ml Alpha Hemolytic Streptococcus NOT Enterococcus* >100,000 cfu/ml Escherichia coli ESBL*       Last 24 Hours Medication List:     Current Facility-Administered Medications:  acetaminophen 650 mg Oral Q6H PRN Odalis Walker PA-C    ascorbic acid 500 mg Oral Daily With Breakfast Geovanny Miller MD    aspirin 81 mg Oral Daily Maira Lazo MD    enoxaparin 40 mg Subcutaneous Daily Maira Lazo MD    ferrous sulfate 325 mg Oral Daily With Breakfast Geovanny Miller MD    folic acid 676 mcg Oral Daily Maira Lazo MD    oxyCODONE 20 mg Oral Q8H PRN Maira Lazo MD    piperacillin-tazobactam 4 5 g Intravenous Q6H Jennyfer Ball MD Last Rate: 4 5 g (09/18/19 1327)   saccharomyces boulardii 250 mg Oral BID Geovanny Miller MD    vancomycin 20 mg/kg Intravenous Q12H Jennyfer Ball MD Last Rate: 1,250 mg (09/18/19 1317)   vancomycin 125 mg Oral Q12H Cat aBker MD         Today, Patient Was Seen By: Geovanny Miller MD    ** Please Note: Dictation voice to text software may have been used in the creation of this document   **

## 2019-09-18 NOTE — ASSESSMENT & PLAN NOTE
Patient has a nonfunctional penile prosthesis  Possible infection of the penile prosthesis  Evaluated by Urology on the previous admission 08/23/2019, it was recommended for it to be removed, patient refused, wanted to do it as outpatient  CT abd 9/14/19 "Small amount of fluid within the left perirectal/mesorectal space, likely residual to prior infection, without significant evidence of a deep pelvic and perineal soft tissue infection as seen on prior exam of 8/23/2019  Residual focus of air seen along the right inferior pubic rami, which may be ulcer related"  Repeat CT yesterday showed an abscess which is in close proximity to the penile implant  Plan is to be removed during this admission    Chela Vivar

## 2019-09-18 NOTE — CONSULTS
Consultation - General Surgery   Kadeem Lloyd Moscat 63 y o  male MRN: [id number]  Unit/Bed#: E2 -01 Encounter: [id number]    Assessment/Plan     Assessment:  63year old male with hx of chronic sacral wounds, nonfunctioning penile prosthesis presenting with sepsis secondary to complicated UTI vs infected penile implant vs abscess     Plan:    1  Chronic wounds  -Pt is known to wound center for hx of chronic sacral wounds  -Nursing and other providers noting significant, copious amounts of purulent drainage from wound  -Pt is stable, he has no leukocytosis and remains afebrile  -CT scan obtained showing left-sided decubitus ulcer extending to the left pubic bone/fascia and into the peritoneum with an abscess adjacent to the peritoneal component measuring 4 8 x 2 4 x 4 8 cm  -On exam, the wound has been packed with kerlix  The kerlix is saturated with purulent material  The wound was probed and there is a tract at the superior and inferior aspects  There is a new tract noted medial/deep that was probed and deloculated  Purulent material was present in this new pocket  -Nursing was concerned for possible urine draining from the sacral wound  She reports that the underpad was saturated with "yellow-clear" drainage and when she removed the packing a copious amount of serous appearing liquid drained from the wound after flushing the dangelo which was not draining  This was investigated with Jean Velasquez PA-C of urology  The dangelo was flushed with roughly 180cc of saline  On exam there was no evidence of urine/saline draining from the wound    -Pt will likely require some form of debridement vs local wound care with packing   This will be discussed with Dr Artie Mosqueda this afternoon    -The pt would benefit from explantation of the penile implant   -Fluid collection near the sigmoid colon unlikely to be an abscess and therefore likely does not require intervention, however if drainage is required will consult IR as pt is diverted and has an ostomy in place  This was reviewed with radiology   -Continue abx per infectious dz- pt growing multiple highly resistant organisms  -Continue local wound care/packing daily   -This was discussed with urology, ID, wound care and SLIM     History of Present Illness     HPI:  Elmer Gregorio is a 63 y o  male who presents with chronic sacral wounds  He initially presented to [facility] with sepsis  He was transferred to [facility] for continued medical management  He has a hx of chronic sacral wounds that have undergone multiple procedures in the past  He reports that he has some pain to the area  He has a chronic indwelling dangelo catheter as well  He states that there has been increased drainage from the wounds over the past week  Inpatient consult to Acute Care Surgery  Consult performed by: Mike Toledo PA-C  Consult ordered by: Axel Mayo MD          Review of Systems   Constitutional: Negative for activity change, chills and fever  HENT: Negative  Eyes: Negative  Respiratory: Negative for cough, chest tightness and shortness of breath  Cardiovascular: Negative for chest pain, palpitations and leg swelling  Gastrointestinal: Negative for abdominal pain, nausea and vomiting  Ostomy    Endocrine: Negative  Genitourinary: Negative for flank pain and hematuria  Urinary catheter in place   Musculoskeletal: Negative  Skin: Positive for wound  Chronic sacral wounds    Allergic/Immunologic: Negative  Neurological: Negative  Hematological: Negative  Psychiatric/Behavioral: Negative          Historical Information   Past Medical History:   Diagnosis Date    Anemia     Blind     r eye    Chronic cystitis     Colostomy in place [facility])     Detrusor sphincter dyssynergia     Diabetes mellitus ([facility] [address] )     Poorly controlled type 2; Last Assessed:  3/18/14    Erectile dysfunction     Frequency of urination     GERD (gastroesophageal reflux disease)     History of diabetes mellitus     History of osteomyelitis     Hx of leg amputation (HCC)     r high upper leg    Hyperlipidemia     Hypertension     Hypospadias     Incomplete bladder emptying     Neurogenic bladder     Paralysis (HCC)     Paraplegia (HCC)     Spinal cord cysts     Ulcer of sacral region [facility])     Urge incontinence      Past Surgical History:   Procedure Laterality Date    AMPUTATION      At hip; Last Assessed:  16    BLADDER SURGERY      COLON SURGERY      llq ostomy pouch    COLOSTOMY      COMPLEX CYSTOMETROGRAM  2014    CYSTOSCOPY  2014    LEG AMPUTATION      MEATOTOMY      PENILE PROSTHESIS IMPLANT  2011    OR ADJ TISS XFER SCALP,EXTREM 10 1-30 SQCM Left 2017    Procedure: POSTERIOR THIGH V-Y ADMANCEMENT;  Surgeon: Arlin Webb MD;  [facility]: [facility];  Service: Plastics    OR MUSCLE-SKIN FLAP,TRUNK Left 2017    Procedure: FLAP CLOSURE LEFT ISCHIAL WOUND and "RIGHT" ISCHIAL FLAP ADVANCEMENT * DEBRIDEMENT, VAC PLACEMENT ;  Surgeon: Arlin Webb MD;  [facility];  Service: Plastics    OR MUSCLE-SKIN FLAP,TRUNK Left 2017    Procedure: gluteal myocutaneous rotational flap, posterior thigh v to y advancement- wound 5 x 2 5 x 8;  Surgeon: Arlin Webb MD;  [facility];  Service: Plastics    SPINE SURGERY      Lower back    UROFLOWMETRY SIMPLE / COMPLEX       Social History   Social History     Substance and Sexual Activity   Alcohol Use Never    Frequency: Never    Comment: Per [employer]:  Social drinker (Onset date:  11/10/17)     Social History     Substance and Sexual Activity   Drug Use No     Social History     Tobacco Use   Smoking Status Former Smoker    Packs/day: 0 50    Years: 10 00    Pack years: 5 00    Last attempt to quit: [place] Years since quittin 7   Smokeless Tobacco Never Used   Tobacco Comment    Onset date:  11/10/17     Family History:   Family History   Problem Relation Age of Onset    No Known Problems Mother     No Known Problems Father        Meds/Allergies   current meds:   Current Facility-Administered Medications   Medication Dose Route Frequency    acetaminophen (TYLENOL) tablet 650 mg  650 mg Oral Q6H PRN    ascorbic acid (VITAMIN C) tablet 500 mg  500 mg Oral Daily With Breakfast    aspirin (ECOTRIN LOW STRENGTH) EC tablet 81 mg  81 mg Oral Daily    enoxaparin (LOVENOX) subcutaneous injection 40 mg  40 mg Subcutaneous Daily    ferrous sulfate tablet 325 mg  325 mg Oral Daily With Breakfast    folic acid (FOLVITE) tablet 500 mcg  500 mcg Oral Daily    oxyCODONE (ROXICODONE) immediate release tablet 20 mg  20 mg Oral Q8H PRN    piperacillin-tazobactam (ZOSYN) 4 5 g in sodium chloride 0 9 % 100 mL IVPB  4 5 g Intravenous Q6H    saccharomyces boulardii (FLORASTOR) capsule 250 mg  250 mg Oral BID    vancomycin (VANCOCIN) 1,250 mg in sodium chloride 0 9 % 250 mL IVPB  20 mg/kg Intravenous Q12H    vancomycin (VANCOCIN) oral solution 125 mg  125 mg Oral Q12H [address]     No Known Allergies    Objective   First Vitals:   Blood Pressure: 99/58 (09/16/19 1145)  Pulse: 104 (09/16/19 1145)  Temperature: 98 6 °F (37 °C) (09/16/19 1145)  Temp Source: Tympanic (09/16/19 1145)  Respirations: 18 (09/16/19 1145)  Height: 5' 7" (170 2 cm) (09/16/19 1145)  Weight - Scale: 68 3 kg (150 lb 9 2 oz) (09/16/19 1145)  SpO2: 98 % (09/16/19 1145)    Current Vitals:   Blood Pressure: 112/70 (09/18/19 0705)  Pulse: 86 (09/18/19 0705)  Temperature: 98 6 °F (37 °C) (09/18/19 0705)  Temp Source: Tympanic (09/18/19 0001)  Respirations: 18 (09/18/19 0705)  Height: 5' 7" (170 2 cm) (09/16/19 1145)  Weight - Scale: 68 3 kg (150 lb 9 2 oz) (09/16/19 1145)  SpO2: 95 % (09/18/19 0705)      Intake/Output Summary (Last 24 hours) at 9/18/2019 1229  Last data filed at 9/18/2019 0705  Gross per 24 hour   Intake    Output 2250 ml   Net -2250 ml       Invasive Devices     Central Venous Catheter Line            CVC Central Lines 09/14/19 Triple Right Subclavian 3 days          Drain            Colostomy Descending/sigmoid LLQ -- days    Urethral Catheter Non-latex 16 Fr  3 days                Physical Exam   Constitutional: He is oriented to person, place, and time  He appears well-developed and well-nourished  No distress  HENT:   Head: Normocephalic and atraumatic  Eyes: EOM are normal    Neck: Normal range of motion  Neck supple  Cardiovascular: Normal rate, regular rhythm and normal heart sounds  Pulmonary/Chest: Effort normal and breath sounds normal  No respiratory distress  Abdominal: Soft  Bowel sounds are normal  He exhibits no distension  There is no tenderness  Colostomy healthy, functional   Genitourinary:   Genitourinary Comments: Catheter in place    Neurological: He is alert and oriented to person, place, and time  Skin: He is not diaphoretic  There are multiple sacral ulcers noted  Of importance, there is a large open wound to the left aspect of the gluteal cleft  Packing was removed which revealed purulent, foul smelling drainage  There are multiple areas of tracts and pockets  There is a tract superiorly, inferiorly, and a new tract deep/medial of the wound  When probed this resulted in purulent drainage  Lab Results:   CBC:   Lab Results   Component Value Date    WBC 9 12 09/18/2019    HGB 8 6 (L) 09/18/2019    HCT 29 1 (L) 09/18/2019    MCV 80 (L) 09/18/2019     (H) 09/18/2019    MCH 23 7 (L) 09/18/2019    MCHC 29 6 (L) 09/18/2019    RDW 16 5 (H) 09/18/2019    MPV 9 7 09/18/2019    NRBC 0 09/18/2019   , CMP:   Lab Results   Component Value Date    SODIUM 140 09/18/2019    K 3 6 09/18/2019     09/18/2019    CO2 30 09/18/2019    BUN 9 09/18/2019    CREATININE 0 46 (L) 09/18/2019    CALCIUM 9 3 09/18/2019    EGFR 124 09/18/2019     Imaging: I have personally reviewed pertinent reports  EKG, Pathology, and Other Studies: I have personally reviewed pertinent reports

## 2019-09-18 NOTE — ASSESSMENT & PLAN NOTE
One set of blood cultures taken 09/14/2019 growing coagulase-negative Staph, most likely contaminate  Repeated blood cultures 9/17 negative so far

## 2019-09-18 NOTE — ASSESSMENT & PLAN NOTE
Lab Results   Component Value Date    HGBA1C 5 2 05/06/2019       No results for input(s): POCGLU in the last 72 hours      Blood Sugar Average: Last 72 hrs:   diet controlled  Last hemoglobin A1c 05/06/2019 was 5 2  Continue to monitor

## 2019-09-18 NOTE — ASSESSMENT & PLAN NOTE
Stage 4 pressure ulcer on the left distal buttock  The packing saturated with purulent material  Frequent repositioning  Wound Care on board

## 2019-09-18 NOTE — PROGRESS NOTES
Progress Note - Urology  Oxana Luther 1960, 63 y o  male MRN: [id number]    Unit/Bed#: E2 -01 Encounter: [id number]    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([address] )  Assessment & Plan  Continue to recommend explant as above to the patient - as I am very concerned that if this is a deep abscess, it could be communicating with and infecting his implant  * Complicated urinary tract infection  Assessment & Plan  Neurogenic bladder with bladder outlet obstruction, CIC at home  Difficulty passing catheter from CIC a home several days before presenting to the hospital   Solomon catheter decompression  Urine culture with Pseudomonas, ESBL, MRSA, Enterococcus and alpha hemolytic strep  Blood culture was Staph coagulase negative, Gram-positive cocci in pairs  Repeat blood cultures with no growth at 24 hours  Current coverage with Zosyn and vancomycin per Infectious Disease with oral vancomycin for C diff prophylaxis  Currently decompressed with Solomon catheter, recommend maintaining for now  Possibly long-term or indefinitely  Definitely until can be seen as an outpatient with plan for penile prosthesis explant at an interval     Plan:  General surgical consultation pending  I did discuss with Particia Foots with General Surgery  Once timing and specifics of general surgical intervention are determined, will discuss with Dr Omar Correa the possibility of removing penile implant at the same time  Concern based on perineal collection and abscess with vianca purulence draining from sacral decubitus ulcer and decubitus ulcer extending to include urethra on imaging that implant is infected and patient will not recover without explant  Bedside rounds performed with ALFREDO Chau  Discussed with Dr Lucila Hernandez of ID, Dr Jasson Seo of [facility] Ardon RN from [facility] , Talia Ledezma PA-C of Gen Surg  Subjective/Objective     Subjective:   Flakito Kaye feels well today    He reports overnight he did have some drainage of urine leaking from his bladder as opposed to into the urethral Solomon  Discussed with ALFREDO Chau caring for the patient, who notes that upon moving him from side to side and changing his dressing in his decubitus ulcer, there was a large gush of clear fluid , possibly urine, maybe 250 cc  Review of Systems   Constitutional: Negative for activity change and appetite change  HENT: Negative for congestion and ear pain  Eyes: Negative for pain  Respiratory: Negative for cough and shortness of breath  Cardiovascular: Negative for chest pain and palpitations  Gastrointestinal: Negative for abdominal distention, abdominal pain, blood in stool, constipation, diarrhea and nausea  Genitourinary: Negative for difficulty urinating, dysuria, flank pain, hematuria, penile pain, penile swelling and scrotal swelling  Musculoskeletal: Negative for arthralgias and myalgias  Skin: Negative for rash  Allergic/Immunologic: Negative for immunocompromised state  Neurological: Negative for dizziness and headaches  Hematological: Negative for adenopathy  Does not bruise/bleed easily  Psychiatric/Behavioral: Negative for agitation  The patient is not nervous/anxious  Objective:  Vitals: Blood pressure 112/70, pulse 86, temperature 98 6 °F (37 °C), resp  rate 18, height 5' 7" (1 702 m), weight 68 3 kg (150 lb 9 2 oz), SpO2 95 %  ,Body mass index is 23 58 kg/m²  Intake/Output Summary (Last 24 hours) at 9/18/2019 1249  Last data filed at 9/18/2019 0705  Gross per 24 hour   Intake    Output 2250 ml   Net -2250 ml     Invasive Devices     Central Venous Catheter Line            CVC Central Lines 09/14/19 Triple Right Subclavian 3 days          Drain            Colostomy Descending/sigmoid LLQ -- days    Urethral Catheter Non-latex 16 Fr  3 days                Physical Exam   Constitutional: He is oriented to person, place, and time  He appears well-developed and well-nourished   He is cooperative  He does not appear ill  No distress  60-year-old male, right eye with cataract  Pleasant, chatty  Appears comfortable  No acute distress  HENT:   Head: Normocephalic and atraumatic  Moist mucous membranes  Eyes: Conjunctivae and EOM are normal    Neck: Normal range of motion  Neck supple  No tracheal deviation present  Cardiovascular: Normal rate, regular rhythm and normal heart sounds  No murmur heard  Pulmonary/Chest: Effort normal and breath sounds normal  No respiratory distress  He has no wheezes  Good airflow bilaterally on deep inspiration  Abdominal: Soft  Bowel sounds are normal  He exhibits no distension and no mass  There is no tenderness  Abdomen obese with multiple surgical scars, left lower quadrant bowel ostomy draining soft stool  No suprapubic tenderness  Genitourinary:   Genitourinary Comments: Scrotum still with superficial wounds and pink skin breakdown  Penile implant still in place without any visible ridge in from the scrotum  Musculoskeletal: Normal range of motion  He exhibits no edema  Neurological: He is alert and oriented to person, place, and time  Skin: He is not diaphoretic  Given concern for possible urinary communication with sacral decubitus ulcer, packing was removed, patient was rolled on his side and bladder was instilled with 240 cc sterile water  There was some purulence draining from sacral decubitus ulcer, probing deep in 3 directions  See wound care note for in-depth description of wound  There was not obvious urine draining from the sacral decubitus ulcer  Psychiatric: He has a normal mood and affect  His behavior is normal  Judgment and thought content normal    Nursing note and vitals reviewed        History:    Past Medical History:   Diagnosis Date    Anemia     Blind     r eye    Chronic cystitis     Col[facility] in place [facility])     Detrusor sphincter dyssynergia     Diabetes mellitus ([facility] [address] )     Poorly controlled type 2; Last Assessed:  3/18/14    Erectile dysfunction     Frequency of urination     GERD (gastroesophageal reflux disease)     History of diabetes mellitus     History of osteomyelitis     Hx of leg amputation (HCC)     r high upper leg    Hyperlipidemia     Hypertension     Hypospadias     Incomplete bladder emptying     Neurogenic bladder     Paralysis (HCC)     Paraplegia (HCC)     Spinal cord cysts     Ulcer of sacral region [address] [address] )     Urge incontinence      Past Surgical History:   Procedure Laterality Date    AMPUTATION      At hip; Last Assessed:  1/19/16    BLADDER SURGERY      COLON SURGERY      llq ostomy pouch    COLOSTOMY      COMPLEX CYSTOMETROGRAM  2014    CYSTOSCOPY  2014    LEG AMPUTATION      MEATOTOMY      PENILE PROSTHESIS IMPLANT  2011    DE ADJ TISS XFER SCALP,EXTREM 10 1-30 SQCM Left 5/1/2017    Procedure: POSTERIOR THIGH V-Y ADMANCEMENT;  Surgeon: Cassidy Chun MD;  [facility];  Service: Plastics    DE MUSCLE-SKIN FLAP,TRUNK Left 5/1/2017    Procedure: FLAP CLOSURE LEFT ISCHIAL WOUND and "RIGHT" ISCHIAL FLAP ADVANCEMENT * DEBRIDEMENT, Hansel Belcher ;  Surgeon: Cassidy Chun MD;  [facility];  Service: Plastics    DE MUSCLE-SKIN FLAP,TRUNK Left 9/27/2017    Procedure: gluteal myocutaneous rotational flap, posterior thigh v to y advancement- wound 5 x 2 5 x 8;  Surgeon: Cassidy Chun MD;  [facility];  Service: Plastics    SPINE SURGERY      Lower back    UROFLOWMETRY SIMPLE / COMPLEX  2014     Family History   Problem Relation Age of Onset    No Known Problems Mother     No Known Problems Father      Social History     Socioeconomic History    Marital status: /Civil Union     Spouse name: Not on file    Number of children: Not on file    Years of education: Not on file    Highest education level: Not on file   Occupational History    Not on file   Social Needs    Financial resource strain: Not on file    Food insecurity: Worry: Not on file     Inability: Not on file    Transportation needs:     Medical: Not on file     Non-medical: Not on file   Tobacco Use    Smoking status: Former Smoker     Packs/day: 0 50     Years: 10 00     Pack years: 5 00     Last attempt to quit:      Years since quittin 7    Smokeless tobacco: Never Used    Tobacco comment: Onset date:  11/10/17   Substance and Sexual Activity    Alcohol use: Never     Frequency: Never     Comment: Per [employer]:  Social drinker (Onset date:  11/10/17)    Drug use: No    Sexual activity: Not on file   Lifestyle    Physical activity:     Days per week: Not on file     Minutes per session: Not on file    Stress: Not on file   Relationships    Social connections:     Talks on phone: Not on file     Gets together: Not on file     Attends Bahai service: Not on file     Active member of club or organization: Not on file     Attends meetings of clubs or organizations: Not on file     Relationship status: Not on file    Intimate partner violence:     Fear of current or ex partner: Not on file     Emotionally abused: Not on file     Physically abused: Not on file     Forced sexual activity: Not on file   Other Topics Concern    Not on file   Social History Narrative    [address] [place]    Social history reviewed, unchanged       Labs:  Recent Labs     19  0458 19  0456 19  0615   WBC 9 40 7 49 9 12     Recent Labs     19  0458 19  0456 19  0615   HGB 7 9* 7 6* 8 6*       Recent Labs     19  0458 19  0456 19  0615   CREATININE 0 42* 0 51* 0 46*     Imaging:   CT abd/pelvis 2019   The ulcer extends anteriorly into the perineum and surrounds urethra  There is a perineal collection with extensive internal air measuring 4 8 x 2 4 x 4 8 cm in keeping with an abscess    Impression:       Left-sided decubitus ulcer extending to the left pubic bone/fascia and into the peritoneum unchanged in size from the prior studies  There is an abscess adjacent to the peritoneal component measuring 4 8 x 2 4 x 4 8 cm  Collection adjacent to the distal sigmoid/rectum, previously communicated with the decubitus ulcer on the 8/23/2019 study and is in keeping with an abscess  Dimensions of 4 4 x 2 9 cm  Extensive chronic osteomyelitis of portions of the left pubic bone, right pubic bone and right acetabulum unchanged from the prior studies       Jean Velasquez PA-C  Date: 9/18/2019 Time: 12:49 PM

## 2019-09-18 NOTE — ASSESSMENT & PLAN NOTE
Patient with a history of C diff    Per ID continue oral vancomycin prophylaxis  Monitor ostomy output

## 2019-09-18 NOTE — WOUND OSTOMY CARE
Progress Note - Wound   Ginger Hazel Moscat 63 y o  male MRN: [id number]  Unit/Bed#: E2 -01 Encounter: [id number]        Assessment:   Patient seen along with surgery and urology PAs for focused assessment of left distal buttock wound--nursing team concerned that left distal buttock wound had significant drainage output during assessment this AM (approximately 200+ ml)  Wound unpacked  New area of tunneling noted extending medially towards rectum  Alva, purulent drainage noted in this same area that was not present on previous assessment 9/16/19  Wound bed continues to be pink, shiny with visible muscle and probing to bone with tunneling at 12 and 4 o'clock  It is likely large drainage output associated with spontaneous abscess rupture  There was no increased drainage in wound bed when dangelo catheter was flushed by urology PA  Wound re-dressed with mesalt packing, Maxorb, and foam dressing  Patient tolerated well  Denies pain prior, during, and post assessment  Patient for possible wound debridement and washout this afternoon  CT of abdomen/pelvis on 9/17 showing chronic osteomyelitis and abscess  Plan:   1-Prevalon boot to left heel  2-Hydraguard lotion to left heel and anterior scrotum BID and PRN  3-EHOB offloading cushion in chair when out of bed  4-Moisturize skin daily with skin nourishing cream   5-Turn/reposition q2h or when medically stable for pressure re-distribution on skin--use positioning wedges    6-P500 low air-loss mattress  7-Left distal buttock stage 4--cleanse with normal saline  3m cavilon no sting skin barrier film to gaby-wound skin  Pack wound with mesalt packing ribbon  Tape end of packing to gaby-wound skin  Valla Rounds over mesal packing and cover with Allevyn Life foam dressing  Change dressing daily and PRN with soilage  8-Left lateral buttock--cleanse soap and water, pat dry   Apply Allevyn Life foam dressing (large sacral)    Lupillo with T   Change dressing every 3 days and PRN  9-Right distal buttock and posterior scrotum--cleanse with soap and water, pat dry  Apply Triad paste daily and PRN with soilage    10-Ostomy care--empty pouch when 1/3 full   Check pouch every 2-4 hours for gas, release as needed   Use 2 piece 2 3/4 (blue label) pouching system     11-Wound care team will follow      Plan of care reviewed with primary RN      Patient should be discharged with [facility] for wound care and follow-up at wound center

## 2019-09-19 ENCOUNTER — APPOINTMENT (INPATIENT)
Dept: RADIOLOGY | Facility: HOSPITAL | Age: 59
DRG: 871 | End: 2019-09-19
Payer: MEDICARE

## 2019-09-19 ENCOUNTER — APPOINTMENT (INPATIENT)
Dept: CT IMAGING | Facility: HOSPITAL | Age: 59
DRG: 871 | End: 2019-09-19
Payer: MEDICARE

## 2019-09-19 LAB
ABO GROUP BLD: NORMAL
ANION GAP SERPL CALCULATED.3IONS-SCNC: 8 MMOL/L (ref 4–13)
BASOPHILS # BLD AUTO: 0.04 THOUSANDS/ΜL (ref 0–0.1)
BASOPHILS NFR BLD AUTO: 0 % (ref 0–1)
BLD GP AB SCN SERPL QL: NEGATIVE
BUN SERPL-MCNC: 7 MG/DL (ref 5–25)
CALCIUM SERPL-MCNC: 8.6 MG/DL (ref 8.3–10.1)
CHLORIDE SERPL-SCNC: 103 MMOL/L (ref 100–108)
CO2 SERPL-SCNC: 28 MMOL/L (ref 21–32)
CREAT SERPL-MCNC: 0.48 MG/DL (ref 0.6–1.3)
EOSINOPHIL # BLD AUTO: 0.28 THOUSAND/ΜL (ref 0–0.61)
EOSINOPHIL NFR BLD AUTO: 3 % (ref 0–6)
ERYTHROCYTE [DISTWIDTH] IN BLOOD BY AUTOMATED COUNT: 16.7 % (ref 11.6–15.1)
GFR SERPL CREATININE-BSD FRML MDRD: 121 ML/MIN/1.73SQ M
GLUCOSE SERPL-MCNC: 84 MG/DL (ref 65–140)
HCT VFR BLD AUTO: 26.1 % (ref 36.5–49.3)
HGB BLD-MCNC: 7.6 G/DL (ref 12–17)
IMM GRANULOCYTES # BLD AUTO: 0.04 THOUSAND/UL (ref 0–0.2)
IMM GRANULOCYTES NFR BLD AUTO: 0 % (ref 0–2)
LYMPHOCYTES # BLD AUTO: 1.21 THOUSANDS/ΜL (ref 0.6–4.47)
LYMPHOCYTES NFR BLD AUTO: 14 % (ref 14–44)
MCH RBC QN AUTO: 23.5 PG (ref 26.8–34.3)
MCHC RBC AUTO-ENTMCNC: 29.1 G/DL (ref 31.4–37.4)
MCV RBC AUTO: 81 FL (ref 82–98)
MONOCYTES # BLD AUTO: 0.57 THOUSAND/ΜL (ref 0.17–1.22)
MONOCYTES NFR BLD AUTO: 6 % (ref 4–12)
NEUTROPHILS # BLD AUTO: 6.84 THOUSANDS/ΜL (ref 1.85–7.62)
NEUTS SEG NFR BLD AUTO: 77 % (ref 43–75)
NRBC BLD AUTO-RTO: 0 /100 WBCS
PLATELET # BLD AUTO: 497 THOUSANDS/UL (ref 149–390)
PMV BLD AUTO: 9.4 FL (ref 8.9–12.7)
POTASSIUM SERPL-SCNC: 3.5 MMOL/L (ref 3.5–5.3)
RBC # BLD AUTO: 3.23 MILLION/UL (ref 3.88–5.62)
RH BLD: POSITIVE
SODIUM SERPL-SCNC: 139 MMOL/L (ref 136–145)
SPECIMEN EXPIRATION DATE: NORMAL
WBC # BLD AUTO: 8.98 THOUSAND/UL (ref 4.31–10.16)

## 2019-09-19 PROCEDURE — 85025 COMPLETE CBC W/AUTO DIFF WBC: CPT | Performed by: INTERNAL MEDICINE

## 2019-09-19 PROCEDURE — 99233 SBSQ HOSP IP/OBS HIGH 50: CPT | Performed by: INTERNAL MEDICINE

## 2019-09-19 PROCEDURE — 0T9B30Z DRAINAGE OF BLADDER WITH DRAINAGE DEVICE, PERCUTANEOUS APPROACH: ICD-10-PCS | Performed by: RADIOLOGY

## 2019-09-19 PROCEDURE — 86900 BLOOD TYPING SEROLOGIC ABO: CPT | Performed by: ANESTHESIOLOGY

## 2019-09-19 PROCEDURE — 72194 CT PELVIS W/O & W/DYE: CPT

## 2019-09-19 PROCEDURE — C1725 CATH, TRANSLUMIN NON-LASER: HCPCS

## 2019-09-19 PROCEDURE — 99231 SBSQ HOSP IP/OBS SF/LOW 25: CPT | Performed by: SURGERY

## 2019-09-19 PROCEDURE — 74430 CONTRAST X-RAY BLADDER: CPT

## 2019-09-19 PROCEDURE — 99232 SBSQ HOSP IP/OBS MODERATE 35: CPT | Performed by: UROLOGY

## 2019-09-19 PROCEDURE — 99153 MOD SED SAME PHYS/QHP EA: CPT

## 2019-09-19 PROCEDURE — 51102 DRAIN BL W/CATH INSERTION: CPT | Performed by: RADIOLOGY

## 2019-09-19 PROCEDURE — 99152 MOD SED SAME PHYS/QHP 5/>YRS: CPT

## 2019-09-19 PROCEDURE — 02HV33Z INSERTION OF INFUSION DEVICE INTO SUPERIOR VENA CAVA, PERCUTANEOUS APPROACH: ICD-10-PCS | Performed by: RADIOLOGY

## 2019-09-19 PROCEDURE — 86901 BLOOD TYPING SEROLOGIC RH(D): CPT | Performed by: ANESTHESIOLOGY

## 2019-09-19 PROCEDURE — 51600 INJECTION FOR BLADDER X-RAY: CPT

## 2019-09-19 PROCEDURE — BT1B1ZZ FLUOROSCOPY OF BLADDER AND URETHRA USING LOW OSMOLAR CONTRAST: ICD-10-PCS | Performed by: RADIOLOGY

## 2019-09-19 PROCEDURE — 76942 ECHO GUIDE FOR BIOPSY: CPT | Performed by: RADIOLOGY

## 2019-09-19 PROCEDURE — 86850 RBC ANTIBODY SCREEN: CPT | Performed by: ANESTHESIOLOGY

## 2019-09-19 PROCEDURE — 80048 BASIC METABOLIC PNL TOTAL CA: CPT | Performed by: INTERNAL MEDICINE

## 2019-09-19 PROCEDURE — 99152 MOD SED SAME PHYS/QHP 5/>YRS: CPT | Performed by: RADIOLOGY

## 2019-09-19 RX ORDER — FENTANYL CITRATE 50 UG/ML
INJECTION, SOLUTION INTRAMUSCULAR; INTRAVENOUS CODE/TRAUMA/SEDATION MEDICATION
Status: COMPLETED | OUTPATIENT
Start: 2019-09-19 | End: 2019-09-19

## 2019-09-19 RX ORDER — MIDAZOLAM HYDROCHLORIDE 1 MG/ML
INJECTION INTRAMUSCULAR; INTRAVENOUS CODE/TRAUMA/SEDATION MEDICATION
Status: COMPLETED | OUTPATIENT
Start: 2019-09-19 | End: 2019-09-19

## 2019-09-19 RX ADMIN — FENTANYL CITRATE 25 MCG: 50 INJECTION, SOLUTION INTRAMUSCULAR; INTRAVENOUS at 16:47

## 2019-09-19 RX ADMIN — Medication 125 MG: at 20:37

## 2019-09-19 RX ADMIN — Medication 125 MG: at 09:07

## 2019-09-19 RX ADMIN — PIPERACILLIN AND TAZOBACTAM 4.5 G: 4; .5 INJECTION, POWDER, LYOPHILIZED, FOR SOLUTION INTRAVENOUS at 05:34

## 2019-09-19 RX ADMIN — FENTANYL CITRATE 25 MCG: 50 INJECTION, SOLUTION INTRAMUSCULAR; INTRAVENOUS at 16:40

## 2019-09-19 RX ADMIN — PIPERACILLIN AND TAZOBACTAM 4.5 G: 4; .5 INJECTION, POWDER, LYOPHILIZED, FOR SOLUTION INTRAVENOUS at 15:17

## 2019-09-19 RX ADMIN — OXYCODONE HYDROCHLORIDE 20 MG: 10 TABLET ORAL at 05:33

## 2019-09-19 RX ADMIN — MIDAZOLAM 0.5 MG: 1 INJECTION INTRAMUSCULAR; INTRAVENOUS at 16:40

## 2019-09-19 RX ADMIN — MIDAZOLAM 0.5 MG: 1 INJECTION INTRAMUSCULAR; INTRAVENOUS at 16:47

## 2019-09-19 RX ADMIN — VANCOMYCIN HYDROCHLORIDE 1250 MG: 5 INJECTION, POWDER, LYOPHILIZED, FOR SOLUTION INTRAVENOUS at 13:11

## 2019-09-19 RX ADMIN — VANCOMYCIN HYDROCHLORIDE 1250 MG: 5 INJECTION, POWDER, LYOPHILIZED, FOR SOLUTION INTRAVENOUS at 00:49

## 2019-09-19 RX ADMIN — FENTANYL CITRATE 50 MCG: 50 INJECTION, SOLUTION INTRAMUSCULAR; INTRAVENOUS at 16:31

## 2019-09-19 RX ADMIN — Medication 250 MG: at 17:58

## 2019-09-19 RX ADMIN — PIPERACILLIN AND TAZOBACTAM 4.5 G: 4; .5 INJECTION, POWDER, LYOPHILIZED, FOR SOLUTION INTRAVENOUS at 20:37

## 2019-09-19 RX ADMIN — OXYCODONE HYDROCHLORIDE 20 MG: 10 TABLET ORAL at 22:17

## 2019-09-19 RX ADMIN — IOHEXOL 20 ML: 300 INJECTION, SOLUTION INTRAVENOUS at 17:48

## 2019-09-19 RX ADMIN — MIDAZOLAM 1 MG: 1 INJECTION INTRAMUSCULAR; INTRAVENOUS at 16:31

## 2019-09-19 RX ADMIN — PIPERACILLIN AND TAZOBACTAM 4.5 G: 4; .5 INJECTION, POWDER, LYOPHILIZED, FOR SOLUTION INTRAVENOUS at 09:07

## 2019-09-19 NOTE — PROGRESS NOTES
Progress Note - Skeeter Gitelman 1960, 63 y o  male MRN: [id number]    Unit/Bed#: E2 -01 Encounter: [id number]    Primary Care Provider: Param Gordillo MD   Date and time admitted to hospital: 9/16/2019 11:44 AM        * Complicated urinary tract infection  Assessment & Plan  Urinary tract infection secondary to neurogenic bladder/urinary retention/self catheterizations/bladder outlet obstruction   Urine culture from 09/13/2019 growing E coli ESBL  Repeat urine culture from 09/14/2019 coagulase negative Staph  Evaluated by Infectious Disease, appreciate recommendations  Patient was transferred from [facility] on Ceftriaxone  ID changed ceftriaxone to [address] 9/16/19  Due to new finding of abscess, antibiotics changed to Vancomycin and Zosyn  Abscess  Assessment & Plan  CT 9/17/19 showed "Left-sided decubitus ulcer extending to the left pubic bone/fascia and into the peritoneum unchanged in size from the prior studies  There is an abscess adjacent to the peritoneal component measuring 4 8 x 2 4 x 4 8 cm "  General surgery on board, appreciate the recommendations  Wound was evaluated by them and a Solomon was visible through the sacral wound  The abscess is most likely draining  Per surgery there is not much necrotic tissue for debridement  CT cystogram showed no urine extravasation  Suprapubic catheter placed  Continue vanc and Zosyn       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[facility])  Assessment & Plan  Patient has a nonfunctional penile prosthesis  Possible infection of the penile prosthesis  Evaluated by Urology on the previous admission 08/23/2019, it was recommended for it to be removed, patient refused, wanted to do it as outpatient  CT abd 9/14/19 "Small amount of fluid within the left perirectal/mesorectal space, likely residual to prior infection, without significant evidence of a deep pelvic and perineal soft tissue infection as seen on prior exam of 8/23/2019    Residual focus of air seen along the right inferior pubic rami, which may be ulcer related"  Possible removal of the implant during this admission  Bacteremia  Assessment & Plan  One set of blood cultures taken 09/14/2019 growing coagulase-negative Staph, most likely contaminate  Repeated blood cultures 9/17 negative so far    Paraplegic spinal paralysis [facility])  Assessment & Plan  Secondary to spinal cyst  Frequent repositioning      History of Clostridium difficile colitis  Assessment & Plan  Patient with a history of C diff  Per ID continue oral vancomycin prophylaxis  Monitor ostomy output    Stage IV pressure ulcer of sacral region [facility])  Assessment & Plan  Stage 4 pressure ulcer on the left distal buttock  Frequent repositioning  Wound Care on board  See plan above    Colostomy in place [facility])  Assessment & Plan  Secondary to chronic sacral ulcers    Neurogenic bladder  Assessment & Plan  Suprapubic catheter placed today    Stage II pressure ulcer of buttock  Assessment & Plan  Stage II pressure ulcer on the right distal buttock, present on admission  Frequent repositioning  Wound Care on board    Opioid use  Assessment & Plan  PDMP reviewed  Continue Oxy 20 mg q 8h p r n  Diabetes mellitus type II, controlled [facility])  Assessment & Plan  Lab Results   Component Value Date    HGBA1C 5 2 05/06/2019       No results for input(s): POCGLU in the last 72 hours  Blood Sugar Average: Last 72 hrs:   diet controlled  Last hemoglobin A1c 05/06/2019 was 5 2  Continue to monitor    Anemia  Assessment & Plan  Baseline around 8 5-9  Hemoglobin today 7 6  No signs of active bleed  Continue to monitor, transfuse if less than 7  Continue ferrous sulfate and vitamin-C        VTE Pharmacologic Prophylaxis:   Pharmacologic: Enoxaparin (Lovenox)  Mechanical VTE Prophylaxis in Place: Yes    Patient Centered Rounds: I have performed bedside rounds with nursing staff today      Discussions with Specialists or Other Care Team Provider: Urology, ID, Surgery    Education and Discussions with Family / Patient: patient    Time Spent for Care: 30 minutes  More than 50% of total time spent on counseling and coordination of care as described above  Current Length of Stay: 3 day(s)    Current Patient Status: Inpatient   Certification Statement: The patient will continue to require additional inpatient hospital stay due to pending possible surgery    Discharge Plan: keep inpatient    Code Status: Level 1 - Full Code      Subjective:   Patient was seen and examined this morning at bedside  He is feeling well, having no issues  Objective:     Vitals:   Temp (24hrs), Av °F (37 2 °C), Min:98 5 °F (36 9 °C), Max:99 9 °F (37 7 °C)    Temp:  [98 5 °F (36 9 °C)-99 9 °F (37 7 °C)] 98 7 °F (37 1 °C)  HR:  [] 97  Resp:  [16-20] 16  BP: ()/(52-87) 124/73  SpO2:  [96 %-100 %] 98 %  Body mass index is 23 58 kg/m²  Input and Output Summary (last 24 hours): Intake/Output Summary (Last 24 hours) at 2019 1742  Last data filed at 2019 1522  Gross per 24 hour   Intake    Output 1825 ml   Net -1825 ml       Physical Exam:     Physical Exam   Constitutional: He is oriented to person, place, and time  Patient seems chronically ill but in no acute distress   HENT:   Head: Normocephalic and atraumatic  Eyes:   Blind in the right eye   Neck: Normal range of motion  Cardiovascular: Normal rate, regular rhythm and normal heart sounds  Exam reveals no gallop and no friction rub  No murmur heard  Pulmonary/Chest: Effort normal and breath sounds normal  No respiratory distress  He has no wheezes  Abdominal: Soft  Bowel sounds are normal  He exhibits no distension  There is no tenderness  Colostomy in place   Genitourinary:   Genitourinary Comments: Suprapubic cath   Musculoskeletal:   Right above the knee amputation   Neurological: He is alert and oriented to person, place, and time  No cranial nerve deficit     Skin:   Sacral ulcer Psychiatric: He has a normal mood and affect  Additional Data:     Labs:    Results from last 7 days   Lab Units 09/19/19  0627   WBC Thousand/uL 8 98   HEMOGLOBIN g/dL 7 6*   HEMATOCRIT % 26 1*   PLATELETS Thousands/uL 497*   NEUTROS PCT % 77*   LYMPHS PCT % 14   MONOS PCT % 6   EOS PCT % 3     Results from last 7 days   Lab Units 09/19/19  0627  09/17/19  0456   SODIUM mmol/L 139   < > 137   POTASSIUM mmol/L 3 5   < > 3 6   CHLORIDE mmol/L 103   < > 101   CO2 mmol/L 28   < > 28   BUN mg/dL 7   < > 7   CREATININE mg/dL 0 48*   < > 0 51*   ANION GAP mmol/L 8   < > 8   CALCIUM mg/dL 8 6   < > 9 0   ALBUMIN g/dL  --   --  1 6*   TOTAL BILIRUBIN mg/dL  --   --  0 21   ALK PHOS U/L  --   --  70   ALT U/L  --   --  18   AST U/L  --   --  21   GLUCOSE RANDOM mg/dL 84   < > 87    < > = values in this interval not displayed  Results from last 7 days   Lab Units 09/14/19  2201   INR  1 15*     Results from last 7 days   Lab Units 09/15/19  1132 09/15/19  0603 09/15/19  0114   POC GLUCOSE mg/dl 95 97 157*         Results from last 7 days   Lab Units 09/17/19  0456 09/15/19  0505 09/14/19  2201   LACTIC ACID mmol/L  --   --  0 8   PROCALCITONIN ng/ml 0 22 0 72* 0 76*           * I Have Reviewed All Lab Data Listed Above  * Additional Pertinent Lab Tests Reviewed: Olivia 66 Admission Reviewed    Imaging:    Imaging Reports Reviewed Today Include: all  Imaging Personally Reviewed by Myself Includes:      Recent Cultures (last 7 days):     Results from last 7 days   Lab Units 09/17/19  0459 09/17/19  0349 09/16/19  1000 09/14/19  2202 09/14/19  2201 09/14/19  1943 09/13/19  1107   BLOOD CULTURE  No Growth at 48 hrs  No Growth at 48 hrs  No Growth at 72 hrs  Staphylococcus coagulase negative* No Growth After 4 Days    --   --    GRAM STAIN RESULT   --   --   --  Gram positive cocci in clusters*  --   --   --    URINE CULTURE   --   --   --   --   --  20,000-29,000 cfu/ml Pseudomonas aeruginosa* 10,000-19,000 cfu/ml Escherichia coli ESBL*  <10,000 cfu/ml Methicillin Resistant Staphylococcus aureus*  10,000-19,000 cfu/ml Enterococcus faecalis*  40,000-49,000 cfu/ml Alpha Hemolytic Streptococcus NOT Enterococcus* >100,000 cfu/ml Escherichia coli ESBL*       Last 24 Hours Medication List:     Current Facility-Administered Medications:  acetaminophen 650 mg Oral Q6H PRN Odalis Walker PA-C    ascorbic acid 500 mg Oral Daily With Breakfast Norman Hall MD    aspirin 81 mg Oral Daily Maira Lazo MD    enoxaparin 40 mg Subcutaneous Daily Maira Lazo MD    ferrous sulfate 325 mg Oral Daily With Breakfast Norman Hall MD    folic acid 972 mcg Oral Daily Maira Lazo MD    iothalamate meglumine 15 mL Bladder Instillation Once in imaging Hans Grubbs PA-C    oxyCODONE 20 mg Oral Q8H PRN Norman Hall MD    piperacillin-tazobactam 4 5 g Intravenous Q6H Samara Munoz MD Last Rate: 4 5 g (09/19/19 1517)   saccharomyces boulardii 250 mg Oral BID Norman Hall MD    vancomycin 20 mg/kg Intravenous Q12H Samara Munoz MD Last Rate: Stopped (09/19/19 1500)   vancomycin 125 mg Oral Q12H Jeffry Latham MD         Today, Patient Was Seen By: Norman Hall MD    ** Please Note: Dictation voice to text software may have been used in the creation of this document   **

## 2019-09-19 NOTE — ASSESSMENT & PLAN NOTE
Patient has a nonfunctional penile prosthesis  Possible infection of the penile prosthesis  Evaluated by Urology on the previous admission 08/23/2019, it was recommended for it to be removed, patient refused, wanted to do it as outpatient  CT abd 9/14/19 "Small amount of fluid within the left perirectal/mesorectal space, likely residual to prior infection, without significant evidence of a deep pelvic and perineal soft tissue infection as seen on prior exam of 8/23/2019  Residual focus of air seen along the right inferior pubic rami, which may be ulcer related"  Possible removal of the implant during this admission

## 2019-09-19 NOTE — BRIEF OP NOTE (RAD/CATH)
Suprapubic catheter Procedure Note    PATIENT NAME: Elmer Gregorio  : 1960  MRN: [id number]     Pre-op Diagnosis:   1  Stage IV pressure ulcer of sacral region ([address] )    2  Sepsis ([address] )    3  UTI (urinary tract infection)    4  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, subsequent encounter    5  Neurogenic bladder    6  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, stage 3 ([address] )    7  Abscess    8  Infection of penile implant, subsequent encounter      Post-op Diagnosis:   1  Stage IV pressure ulcer of sacral region ([address] )    2  Sepsis ([address] )    3  UTI (urinary tract infection)    4  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, subsequent encounter    5  Neurogenic bladder    6  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, stage 3 ([address] )    7  Abscess    8  Infection of penile implant, subsequent encounter        Surgeon:   Sarah Schmitt MD  Assistants:     No qualified resident was available, Resident is only observing    Estimated Blood Loss: none  Findings: 16 Fr suprapubic catheter placed with ultrasound and fluoroscopic guidance      Specimens: none    Complications:  none    Anesthesia: Conscious sedation and Local    Sarah Schmitt MD     Date: 2019  Time: 4:55 PM

## 2019-09-19 NOTE — PROGRESS NOTES
Vancomycin IV Pharmacy-to-Dose Consultation    Nichol Gay is a 63 y o  male who is currently receiving Vancomycin IV with management by the Pharmacy Consult service  Assessment/Plan:  The patient was reviewed  Renal function is stable and no signs or symptoms of nephrotoxicity and/or infusion reactions were documented in the chart  Based on todays assessment, continue current vancomycin (day # 2) dosing of 1250 mg every 12 hours, with a plan for trough to be drawn at 1300 on 9/20/19  We will continue to follow the patients culture results and clinical progress daily      Apple Tineo, Pharmacist

## 2019-09-19 NOTE — PROGRESS NOTES
Progress Note - Urology  [facility] 1960, 63 y o  male MRN: [id number]    Unit/Bed#: E2 -01 Encounter: [id number]    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[facility])  Assessment & Plan  Eventually this prosthesis will need to be removed  However at this point, on imaging and exam it appears not to be grossly infected but rather to be communication from the urinary tract from obliterated urethra with loss of domain communicating with this decubitus ulcer  For now, will divert urine and follow clinical course  If does not improve, we will revisit explanting the prosthesis during this hospitalization  If he stabilizes, we will plan for outpatient follow-up with Dr Margarita Damian for explant at that time  * Complicated urinary tract infection  Assessment & Plan  Neurogenic bladder with bladder outlet obstruction, CIC at home  Difficulty passing catheter from CIC a home several days before presenting to the hospital   Solomon catheter decompression, however on palpation deep into the sacral decubitus ulcer, urethral Solomon is involved  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  Distal where the penile implant is is intact, and urethral Solomon is intact in the bladder  However, upon removal of this Solomon catheter repeat catheterization would result in Solomon catheter coming out into sacral decubitus ulcer  In an attempt to divert all urine away from draining into this decubitus ulcer, IR consultation is requested for suprapubic catheter placement  Urine culture with Pseudomonas, ESBL, MRSA, Enterococcus and alpha hemolytic strep  Blood culture was Staph coagulase negative, Gram-positive cocci in pairs  Repeat blood cultures with no growth  Current coverage with Zosyn and vancomycin per Infectious Disease with oral vancomycin for C diff prophylaxis  Currently decompressed with Solomon catheter, recommend maintaining for now  Possibly long-term or indefinitely    Definitely until can be seen as an outpatient with plan for penile prosthesis explant at an interval     Plan:  General surgical consultation pending  I did discuss with Korin Kirkland with General Surgery  Once timing and specifics of general surgical intervention are determined, will discuss with Dr Connie Douglass the possibility of removing penile implant at the same time  Concern based on perineal collection and abscess with vianca purulence draining from sacral decubitus ulcer and decubitus ulcer extending to include urethra on imaging that implant is infected and patient will not recover without explant  Bedside rounds performed with Ana Gray RN  Discussed with Dr Connie Douglass, who examined patient with me, Dr Ashley Brownlee, Dr Tressa Tafoya and Dr Shawn Del Cid of ID  Subjective/Objective     Subjective:   Lisa Berkowitz continues to feel about the same  He is hungry today  No nausea or vomiting  Continues to complain of some back pain, which is chronic and normal for him  Does not have much sensation therefore tolerate packing and wound care changes as well as repeat exams well  Solomon catheter continues to drain clear yellow urine  Review of Systems   Constitutional: Negative for activity change and appetite change  HENT: Negative for congestion and ear pain  Eyes: Negative for pain  Respiratory: Negative for cough and shortness of breath  Cardiovascular: Negative for chest pain and palpitations  Gastrointestinal: Negative for abdominal distention, abdominal pain, blood in stool, constipation, diarrhea and nausea  Genitourinary: Negative for difficulty urinating, dysuria and hematuria  Musculoskeletal: Negative for arthralgias and myalgias  Skin: Negative for rash  Allergic/Immunologic: Negative for immunocompromised state  Neurological: Negative for dizziness and headaches  Hematological: Negative for adenopathy  Does not bruise/bleed easily  Psychiatric/Behavioral: Negative for agitation   The patient is not nervous/anxious  Objective:  Vitals: Blood pressure 91/52, pulse (!) 107, temperature 98 5 °F (36 9 °C), temperature source Tympanic, resp  rate 18, height 5' 7" (1 702 m), weight 68 3 kg (150 lb 9 2 oz), SpO2 98 %  ,Body mass index is 23 58 kg/m²  Intake/Output Summary (Last 24 hours) at 9/19/2019 1429  Last data filed at 9/19/2019 1311  Gross per 24 hour   Intake 288 95 ml   Output 3300 ml   Net -3011 05 ml     Invasive Devices     Central Venous Catheter Line            CVC Central Lines 09/14/19 Triple Right Subclavian 4 days          Drain            Colostomy Descending/sigmoid LLQ -- days    Urethral Catheter Non-latex 16 Fr  4 days                Physical Exam   Constitutional: He is oriented to person, place, and time  He appears well-developed and well-nourished  He is cooperative  He does not appear ill  No distress  60-year-old male, right eye with cataract  No acute distress  Resting comfortably in bed  Pleasant, chatty  Able to participate in exam   HENT:   Head: Normocephalic and atraumatic  Moist mucous membranes  Eyes: Conjunctivae and EOM are normal    Neck: Normal range of motion  Neck supple  No tracheal deviation present  Cardiovascular: Normal rate, regular rhythm and normal heart sounds  No murmur heard  Pulmonary/Chest: Effort normal and breath sounds normal  No respiratory distress  He has no wheezes  Good airflow bilaterally on deep inspiration  Abdominal: Soft  Bowel sounds are normal  He exhibits no distension and no mass  There is no tenderness  Abdomen with distention, multiple midline scars, left-sided diverting ostomy with pink viable stoma, soft brown stool  Genitourinary:   Genitourinary Comments: Urethral Solomon draining clear yellow urine without significant hematuria      Sacral decubitus ulcer is examined in conjunction with Dr Small Cancer after speaking with Dr Mitra Schulz and re-examining, I urethral Solomon is palpable and can be manipulated through deep in this sacral decubitus ulcer  Probes deep to bone in multiple directions   Musculoskeletal: Normal range of motion  He exhibits no edema  Neurological: He is alert and oriented to person, place, and time  Skin: Skin is warm and dry  No rash noted  He is not diaphoretic  No erythema  No pallor  Psychiatric: He has a normal mood and affect  His behavior is normal  Judgment and thought content normal    Nursing note and vitals reviewed        History:    Past Medical History:   Diagnosis Date    Anemia     Blind     r eye    Chronic cystitis     Col[facility] in place [facility])     Detrusor sphincter dyssynergia     Diabetes mellitus ([facility] [address] )     Poorly controlled type 2; Last Assessed:  3/18/14    Erectile dysfunction     Frequency of urination     GERD (gastroesophageal reflux disease)     History of diabetes mellitus     History of osteomyelitis     Hx of leg amputation (HCC)     r high upper leg    Hyperlipidemia     Hypertension     Hypospadias     Incomplete bladder emptying     Neurogenic bladder     Paralysis (HCC)     Paraplegia (HCC)     Spinal cord cysts     [facility]     Urge incontinence      Past Surgical History:   Procedure Laterality Date    AMPUTATION      At hip; Last Assessed:  1/19/16    BLADDER SURGERY      COLON SURGERY      llq ostomy pouch    COLOSTOMY      COMPLEX CYSTOMETROGRAM  2014    CYSTOSCOPY  2014    LEG AMPUTATION      MEATOTOMY      PENILE PROSTHESIS IMPLANT  2011    PA ADJ TISS XFER SCALP,EXTREM 10 1-30 SQCM Left 5/1/2017    Procedure: POSTERIOR THIGH V-Y ADMANCEMENT;  Surgeon: Son Bustillo MD;  [facility];  Service: Plastics    PA MUSCLE-SKIN FLAP,TRUNK Left 5/1/2017    Procedure: FLAP CLOSURE LEFT ISCHIAL WOUND and "RIGHT" ISCHIAL FLAP ADVANCEMENT * DEBRIDEMENT, [place] ;  Surgeon: Son Bustillo MD;  Location[facility];  Service: Plastics    PA MUSCLE-SKIN FLAP,TRUNK Left 9/27/2017    Procedure: gluteal myocutaneous rotational flap, posterior thigh v to y advancement- wound 5 x 2 5 x 8;  Surgeon: Cong Tse MD;  [facility];  Service: Plastics    SPINE SURGERY      Lower back    UROFLOWMETRY SIMPLE / COMPLEX       Family History   Problem Relation Age of Onset    No Known Problems Mother     No Known Problems Father      Social History     Socioeconomic History    Marital status: /Civil Union     Spouse name: Not on file    Number of children: Not on file    Years of education: Not on file    Highest education level: Not on file   Occupational History    Not on file   Social Needs    Financial resource strain: Not on file    Food insecurity:     Worry: Not on file     Inability: Not on file    Transportation needs:     Medical: Not on file     Non-medical: Not on file   Tobacco Use    Smoking status: Former Smoker     Packs/day: 0 50     Years: 10 00     Pack years: 5 00     Last attempt to quit:      Years since quittin 7    Smokeless tobacco: Never Used    Tobacco comment: Onset date:  11/10/17   Substance and Sexual Activity    Alcohol use: Never     Frequency: Never     Comment: Per [employer]:  Social drinker (Onset date:  11/10/17)    Drug use: No    Sexual activity: Not on file   Lifestyle    Physical activity:     Days per week: Not on file     Minutes per session: Not on file    Stress: Not on file   Relationships    Social connections:     Talks on phone: Not on file     Gets together: Not on file     Attends Mosque service: Not on file     Active member of club or organization: Not on file     Attends meetings of clubs or organizations: Not on file     Relationship status: Not on file    Intimate partner violence:     Fear of current or ex partner: Not on file     Emotionally abused: Not on file     Physically abused: Not on file     Forced sexual activity: Not on file   Other Topics Concern    Not on file   Social History Narrative    Native language [address] [facility] history reviewed, unchanged       Labs:  Recent Labs     09/17/19  0456 09/18/19  0615 09/19/19  0627   WBC 7 49 9 12 8 98     Recent Labs     09/17/19  0456 09/18/19  0615 09/19/19  0627   HGB 7 6* 8 6* 7 6*       Recent Labs     09/17/19  0456 09/18/19  0615 09/19/19  0285   CREATININE 0 51* 0 46* 0 48*     Imaging:   Ct cystogram reviewed with Dr Jose Lane - formal radiology read pending  Urethral Solomon with contrast in the bladder, no active extravasation of contrast into the decubitus ulcer  Penile implant cylinders are visualized, without active extravasation around the area      Jean Velasquez PA-C  Date: 9/19/2019 Time: 2:29 PM

## 2019-09-19 NOTE — ASSESSMENT & PLAN NOTE
Urinary tract infection secondary to neurogenic bladder/urinary retention/self catheterizations/bladder outlet obstruction   Urine culture from 09/13/2019 growing E coli ESBL  Repeat urine culture from 09/14/2019 coagulase negative Staph  Evaluated by Infectious Disease, appreciate recommendations  Patient was [facility] from [facility] on Ceftri[facility]one  ID changed ceftriaxone to [address] 9/16/19  Due to new finding of abscess, antibiotics changed to Vancomycin and Zosyn

## 2019-09-19 NOTE — ASSESSMENT & PLAN NOTE
Stage 4 pressure ulcer on the left distal buttock  Frequent repositioning  Wound Care on board  See plan above

## 2019-09-19 NOTE — PROGRESS NOTES
Progress Note - Infectious Disease   [place] Shane Arguello 63 y o  male MRN: [id number]  Unit/Bed#: E2 -01 Encounter: [id number]      ADDENDUM 10:57am - I was notified that during general surgery exam the patient's Solomon catheter was present within the decubitus ulcer  He will require urinary diversion, likely a suprapubic catheter  General surgery and Urology continue to discuss surgical options  Impression/Plan:  1  Sepsis  POA  Fever, tachycardia, leukocytosis   Likely secondary to complicated urinary tract infection in the setting of patient with urinary retention   Urine culture unfortunately showed fairly resistant polymicrobial results of Pseudomonas aeruginosa, E coli ESBL, MRSA, Enterococcus faecalis, and alpha hemolytic Streptococcus  Kidneys did not appear abnormal on patient's initial CT scan  Now patient with increased discharge from his chronic decubitus ulcerations  New CT of the abdomen and pelvis was concerning for a peritoneal abscess  There is also concern that decubitus ulcer is extending anteriorly into the perineum and urethra around his penile prosthesis  I suspect patient would benefit from surgical debridement of the wound, drainage of the abscess, and immediate removal of his penile prosthesis  Without source control the patient's bacterial infection will likely continue to progress and he may develop additional antibiotic resistance   -antibiotics as below  -check CBCD and BMP tomorrow  -monitor vitals  -supportive care  -continue follow-up with general surgery  -continue follow-up with urology     2  Complicated urinary tract infection  Likely related to patient having difficulty with self catheterization  Also consider possible communication of his decubitus ulcers and urethra  Also consider penile implant as possible source  New CT imaging was concerning for an abscess and extending ulceration towards the perineum and urethra    Urine culture unfortunately showed fairly resistant polymicrobial results of Pseudomonas aeruginosa, E coli ESBL, MRSA, Enterococcus faecalis, and alpha hemolytic Streptococcus  Patient is now status post placement of a Solomon catheter  He may benefit from pursuing a suprapubic tube  Given updated culture results his antibiotic coverage was broadened yesterday  He is tolerating IV Zosyn and IV vancomycin without difficulty  -continue IV Zosyn  -continue IV vancomycin  -continue pharmacy consult for guidance on vancomycin dosage  -check CBCD and BMP tomorrow  -monitor vitals  -monitor urine output  -serial Solomon catheter care and exams  -continue close follow-up with urology     3  Coag-negative Staphylococcus bacteremia  Showing in one set of his initial blood cultures   I suspect this is representative of skin contamination and not in active bacteremia   Patient is clinically stable without fever or leukocytosis   Repeat blood cultures are negative after 48 hours   -no indication for antibiotics for this issue  -monitor CBC and BMP  -follow up repeat blood cultures  -monitor vitals     4  Chronic decubitus ulcers  POA  Now with development of an abscess and concern that wound may be communicating with perineum and urethra  Concern penile implant may be a source of bacteria as well  Patient will likely need debridement of his wound and drainage of the abscess  I recommend removing the penile implant at this time as his bacterial infection will likely progress if there is not adequate source control  He is following closely with General surgery and urology who are discussing surgical options   -serial wound exams  -local wound care per wound management team  -continue close follow-up with wound management  -continue close follow-up with general surgery  -continue close follow-up with urology     5  Penile implant  Implant is not functioning  It may be contributing to his aggressive bacterial infection above    I recommend removing the penile implant as soon as possible as he will likely have ongoing bacterial infections if there is not adequate source control   -continue close follow-up with urology     6  Scrotal excoriations   Patient with multiple superficial erythematous excoriations on the base of the scrotum as well as along the penis   These areas have a base of pink fibrous tissue and are not weeping  Patient unable to feel pain due to his paraplegia   Scrotum has no palpable fluctuance  He is following closely with wound management   -serial scrotum exams  -continue close follow-up with wound management     7  Paraplegia    -supportive care      8  History of C diff   Patient noted with prior history of C diff which is not documented in our system   He denies having any increased to his ostomy output   He has no ongoing abdominal pain or cramping   He is receiving PO Vancomycin for prophylaxis and is tolerating without difficulty  -continue PO vancomycin prophylaxis  -monitor abdominal symptoms  -monitor stool output in ostomy bag    Above plan was discussed in detail with patient at the bedside  Above plan was discussed in detail with Urology Tawnya BACA  Above plan was discussed in detail with [facility] attending, Dr Herbie Norris  Antibiotics:  Zosyn 2  Vancomycin 2  PO vancomycin 4  Antibiotics 6    Subjective:  Patient reports he is feeling fine today  He has no acute complaints  He denies fever, chills, sweats, shakes; no nausea, vomiting, abdominal pain; he has no concerns with his Solomon catheter but reports it continues to leak urine; he has no concerns with his ostomy pouch and believe his stool looks normal; no cough, shortness of breath, or chest pain  No new symptoms      Objective:  Vitals:  Temp:  [98 5 °F (36 9 °C)-100 4 °F (38 °C)] 98 5 °F (36 9 °C)  HR:  [] 107  Resp:  [16-20] 18  BP: ()/(52-74) 91/52  SpO2:  [98 %-99 %] 98 %  Temp (24hrs), Av 6 °F (37 6 °C), Min:98 5 °F (36 9 °C), Max:100 4 °F (38 °C)  Current: Temperature: 98 5 °F (36 9 °C)    Physical Exam:   General Appearance:  Alert, interactive, no acute distress  Patient appears chronically debilitated  Throat: Oropharynx moist without lesions  Lungs:   Clear to auscultation bilaterally; no wheezes, rhonchi or rales; respirations unlabored   Heart:  RRR; no murmur, rub or gallop   Abdomen:   Soft, non-tender, non-distended, positive bowel sounds  Ostomy intact, stool output is than in brown  Back: Allyven foam intact over sacrum/buttock excoriations, did not remove; packing in buttock ulcer is moist with yellow output, did not remove  Genitourinary: Solomon catheter intact, urine output is yellow, I did see leaking urine around the tip of the penis and on his bed pad   Extremities: No clubbing or cyanosis, no LLE edema; right BKA stump healed  Skin: No new rashes, lesions, or draining wounds noted on exposed skin  Labs, Imaging, & Other studies:   All pertinent labs and imaging studies were personally reviewed  Results from last 7 days   Lab Units 09/19/19  0627 09/18/19  0615 09/17/19  0456   WBC Thousand/uL 8 98 9 12 7 49   HEMOGLOBIN g/dL 7 6* 8 6* 7 6*   PLATELETS Thousands/uL 497* 499* 412*     Results from last 7 days   Lab Units 09/19/19  0627 09/17/19 0456  09/14/19  2201   POTASSIUM mmol/L 3 5   < > 3 6   < > 3 3*   CHLORIDE mmol/L 103   < > 101   < > 98   CO2 mmol/L 28   < > 28   < > 27   BUN mg/dL 7   < > 7   < > 18   CREATININE mg/dL 0 48*   < > 0 51*   < > 0 50*   EGFR ml/min/1 73sq m 121   < > 118   < > 119   CALCIUM mg/dL 8 6   < > 9 0   < > 8 4   AST U/L  --   --  21  --  19   ALT U/L  --   --  18  --  8*   ALK PHOS U/L  --   --  70  --  95    < > = values in this interval not displayed  Results from last 7 days   Lab Units 09/17/19  0459 09/17/19  0349 09/16/19  1000 09/14/19  2202 09/14/19  2201 09/14/19  1943 09/13/19  1107   BLOOD CULTURE  No Growth at 48 hrs  No Growth at 48 hrs  No Growth at 48 hrs   Staphylococcus coagulase negative* No Growth at 72 hrs   --   --    GRAM STAIN RESULT   --   --   --  Gram positive cocci in clusters*  --   --   --    URINE CULTURE   --   --   --   --   --  20,000-29,000 cfu/ml Pseudomonas aeruginosa*  10,000-19,000 cfu/ml Escherichia coli ESBL*  <10,000 cfu/ml Methicillin Resistant Staphylococcus aureus*  10,000-19,000 cfu/ml Enterococcus faecalis*  40,000-49,000 cfu/ml Alpha Hemolytic Streptococcus NOT Enterococcus* >100,000 cfu/ml Escherichia coli ESBL*

## 2019-09-20 LAB
ANION GAP SERPL CALCULATED.3IONS-SCNC: 7 MMOL/L (ref 4–13)
BACTERIA BLD CULT: NORMAL
BASOPHILS # BLD AUTO: 0.04 THOUSANDS/ΜL (ref 0–0.1)
BASOPHILS NFR BLD AUTO: 1 % (ref 0–1)
BUN SERPL-MCNC: 8 MG/DL (ref 5–25)
CALCIUM SERPL-MCNC: 8.5 MG/DL (ref 8.3–10.1)
CHLORIDE SERPL-SCNC: 105 MMOL/L (ref 100–108)
CO2 SERPL-SCNC: 28 MMOL/L (ref 21–32)
CREAT SERPL-MCNC: 0.52 MG/DL (ref 0.6–1.3)
EOSINOPHIL # BLD AUTO: 0.32 THOUSAND/ΜL (ref 0–0.61)
EOSINOPHIL NFR BLD AUTO: 5 % (ref 0–6)
ERYTHROCYTE [DISTWIDTH] IN BLOOD BY AUTOMATED COUNT: 16.8 % (ref 11.6–15.1)
GFR SERPL CREATININE-BSD FRML MDRD: 118 ML/MIN/1.73SQ M
GLUCOSE SERPL-MCNC: 72 MG/DL (ref 65–140)
HCT VFR BLD AUTO: 26.2 % (ref 36.5–49.3)
HGB BLD-MCNC: 7.6 G/DL (ref 12–17)
IMM GRANULOCYTES # BLD AUTO: 0.03 THOUSAND/UL (ref 0–0.2)
IMM GRANULOCYTES NFR BLD AUTO: 0 % (ref 0–2)
LYMPHOCYTES # BLD AUTO: 1.2 THOUSANDS/ΜL (ref 0.6–4.47)
LYMPHOCYTES NFR BLD AUTO: 18 % (ref 14–44)
MCH RBC QN AUTO: 23.8 PG (ref 26.8–34.3)
MCHC RBC AUTO-ENTMCNC: 29 G/DL (ref 31.4–37.4)
MCV RBC AUTO: 82 FL (ref 82–98)
MONOCYTES # BLD AUTO: 0.41 THOUSAND/ΜL (ref 0.17–1.22)
MONOCYTES NFR BLD AUTO: 6 % (ref 4–12)
NEUTROPHILS # BLD AUTO: 4.68 THOUSANDS/ΜL (ref 1.85–7.62)
NEUTS SEG NFR BLD AUTO: 70 % (ref 43–75)
NRBC BLD AUTO-RTO: 0 /100 WBCS
PLATELET # BLD AUTO: 540 THOUSANDS/UL (ref 149–390)
PMV BLD AUTO: 9.6 FL (ref 8.9–12.7)
POTASSIUM SERPL-SCNC: 3.5 MMOL/L (ref 3.5–5.3)
RBC # BLD AUTO: 3.2 MILLION/UL (ref 3.88–5.62)
SODIUM SERPL-SCNC: 140 MMOL/L (ref 136–145)
VANCOMYCIN TROUGH SERPL-MCNC: 13.7 UG/ML (ref 10–20)
WBC # BLD AUTO: 6.68 THOUSAND/UL (ref 4.31–10.16)

## 2019-09-20 PROCEDURE — 80202 ASSAY OF VANCOMYCIN: CPT | Performed by: INTERNAL MEDICINE

## 2019-09-20 PROCEDURE — 99233 SBSQ HOSP IP/OBS HIGH 50: CPT | Performed by: INTERNAL MEDICINE

## 2019-09-20 PROCEDURE — 99232 SBSQ HOSP IP/OBS MODERATE 35: CPT | Performed by: PHYSICIAN ASSISTANT

## 2019-09-20 PROCEDURE — 80048 BASIC METABOLIC PNL TOTAL CA: CPT | Performed by: NURSE PRACTITIONER

## 2019-09-20 PROCEDURE — 85025 COMPLETE CBC W/AUTO DIFF WBC: CPT | Performed by: NURSE PRACTITIONER

## 2019-09-20 RX ADMIN — Medication 250 MG: at 18:08

## 2019-09-20 RX ADMIN — VANCOMYCIN HYDROCHLORIDE 1250 MG: 5 INJECTION, POWDER, LYOPHILIZED, FOR SOLUTION INTRAVENOUS at 13:06

## 2019-09-20 RX ADMIN — OXYCODONE HYDROCHLORIDE AND ACETAMINOPHEN 500 MG: 500 TABLET ORAL at 08:11

## 2019-09-20 RX ADMIN — PIPERACILLIN AND TAZOBACTAM 4.5 G: 4; .5 INJECTION, POWDER, LYOPHILIZED, FOR SOLUTION INTRAVENOUS at 21:59

## 2019-09-20 RX ADMIN — ASPIRIN 81 MG: 81 TABLET, COATED ORAL at 09:02

## 2019-09-20 RX ADMIN — PIPERACILLIN AND TAZOBACTAM 4.5 G: 4; .5 INJECTION, POWDER, LYOPHILIZED, FOR SOLUTION INTRAVENOUS at 09:03

## 2019-09-20 RX ADMIN — FERROUS SULFATE TAB 325 MG (65 MG ELEMENTAL FE) 325 MG: 325 (65 FE) TAB at 08:11

## 2019-09-20 RX ADMIN — ENOXAPARIN SODIUM 40 MG: 40 INJECTION SUBCUTANEOUS at 09:02

## 2019-09-20 RX ADMIN — VANCOMYCIN HYDROCHLORIDE 1250 MG: 5 INJECTION, POWDER, LYOPHILIZED, FOR SOLUTION INTRAVENOUS at 00:38

## 2019-09-20 RX ADMIN — OXYCODONE HYDROCHLORIDE 20 MG: 10 TABLET ORAL at 23:35

## 2019-09-20 RX ADMIN — VANCOMYCIN HYDROCHLORIDE 1500 MG: 5 INJECTION, POWDER, LYOPHILIZED, FOR SOLUTION INTRAVENOUS at 23:38

## 2019-09-20 RX ADMIN — OXYCODONE HYDROCHLORIDE 20 MG: 10 TABLET ORAL at 08:17

## 2019-09-20 RX ADMIN — Medication 250 MG: at 09:02

## 2019-09-20 RX ADMIN — PIPERACILLIN AND TAZOBACTAM 4.5 G: 4; .5 INJECTION, POWDER, LYOPHILIZED, FOR SOLUTION INTRAVENOUS at 03:24

## 2019-09-20 RX ADMIN — Medication 125 MG: at 09:02

## 2019-09-20 RX ADMIN — OXYCODONE HYDROCHLORIDE 20 MG: 10 TABLET ORAL at 15:43

## 2019-09-20 RX ADMIN — PIPERACILLIN AND TAZOBACTAM 4.5 G: 4; .5 INJECTION, POWDER, LYOPHILIZED, FOR SOLUTION INTRAVENOUS at 15:44

## 2019-09-20 RX ADMIN — FOLIC ACID 500 MCG: 1 TABLET ORAL at 09:01

## 2019-09-20 RX ADMIN — Medication 125 MG: at 21:59

## 2019-09-20 NOTE — PROGRESS NOTES
Vancomycin Assessment    Carolina Saint is a 63 y o  male who is currently receiving vancomycin 1250 mg IV every 12 hours for osteomyelitis  Relevant clinical data and objective history reviewed:  Creatinine   Date Value Ref Range Status   09/20/2019 0 52 (L) 0 60 - 1 30 mg/dL Final     Comment:     Standardized to IDMS reference method   09/19/2019 0 48 (L) 0 60 - 1 30 mg/dL Final     Comment:     Standardized to IDMS reference method   09/18/2019 0 46 (L) 0 60 - 1 30 mg/dL Final     Comment:     Standardized to IDMS reference method   02/17/2014 0 59 (L) 0 60 - 1 30 mg/dL Final     Comment:     Standardized to IDMS reference method   01/08/2014 0 44 (L) 0 5 - 1 5 mg/dL Final     Comment:     Standardized to IDMS reference method   01/07/2014 0 61 0 60 - 1 30 mg/dL Final     Comment:     Standardized to IDMS reference method     /72 (BP Location: Left arm)   Pulse 76   Temp (!) 97 3 °F (36 3 °C) (Tympanic)   Resp 15   Ht 5' 7" (1 702 m)   Wt 68 3 kg (150 lb 9 2 oz)   SpO2 95%   BMI 23 58 kg/m²   I/O last 3 completed shifts:  In: -   Out: 3250 [Urine:3000; Stool:250]  Lab Results   Component Value Date/Time    BUN 8 09/20/2019 05:51 AM    BUN 17 02/17/2014 10:08 AM    WBC 6 68 09/20/2019 05:51 AM    WBC 4 78 02/17/2014 10:08 AM    HGB 7 6 (L) 09/20/2019 05:51 AM    HGB 14 5 02/17/2014 10:08 AM    HCT 26 2 (L) 09/20/2019 05:51 AM    HCT 45 8 02/17/2014 10:08 AM    MCV 82 09/20/2019 05:51 AM    MCV 90 02/17/2014 10:08 AM     (H) 09/20/2019 05:51 AM     02/17/2014 10:08 AM     Temp Readings from Last 3 Encounters:   09/20/19 (!) 97 3 °F (36 3 °C) (Tympanic)   09/16/19 98 6 °F (37 °C) (Temporal)   09/13/19 98 7 °F (37 1 °C) (Temporal)     Vancomycin Days of Therapy: 3    Assessment/Plan  The patient is currently on vancomycin utilizing scheduled dosing   The patient is receiving 1250 mg IV every 12 hours with the most recent vancomycin level being at steady-state and sub-therapeutic (13 7) based on a goal of 15-20 (appropriate for most indications); therefore, after clinical evaluation will be changed to 1500 mg IV every 12 hours   Pharmacy will continue to follow closely for s/sx of nephrotoxicity, infusion reactions and appropriateness of therapy  BMP and CBC will be ordered per protocol  Plan for trough as patient approaches steady state, prior to the 4th  dose at approximately 1030 on 9/22/2019  Pharmacy will continue to follow the patients culture results and clinical progress daily      Ese Parks, Pharmacist

## 2019-09-20 NOTE — PROGRESS NOTES
Progress Note - Urology  Oxana Luther 1960, 63 y o  male MRN: [id number]    Unit/Bed#: E2 -01 Encounter: [id number]    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[facility])  Assessment & Plan  Eventually this prosthesis will need to be removed  However at this point, on imaging and exam it appears not to be grossly infected but rather to be communication from the urinary tract from obliterated urethra with loss of domain communicating with this decubitus ulcer  For now, will divert urine and follow clinical course  If does not improve, we will revisit explanting the prosthesis during this hospitalization  If he stabilizes, we will plan for outpatient follow-up with Dr Oscar Gibbs for explant at that time  * Complicated urinary tract infection  Assessment & Plan  Neurogenic bladder with bladder outlet obstruction, CIC at home  Difficulty passing catheter from CIC a home several days before presenting to the hospital   Solomon catheter decompression, however on palpation deep into the sacral decubitus ulcer, urethral Solomon is involved  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  Distal where the penile implant is is intact, and urethral Solomon is intact in the bladder  However, upon removal of this Solomon catheter repeat catheterization would result in Solomon catheter coming out into sacral decubitus ulcer  Postoperative day 1 status post IR placement of suprapubic catheter, [address] [place]  Draining well with clear yellow urine overnight  Current coverage with Zosyn and vancomycin per Infectious Disease with oral vancomycin for C diff prophylaxis  I did discuss today with Flakito Kaye that this suprapubic tube is likely for life, given his loss of domain in his urethra  It is unlikely he will be able to go back to CIC or even chronic urethral Solomon in the future  Subjective/Objective     Subjective:   Flakito Frankrivka feels well today  No issues with the SPT overnight    He about the same offers no new complaints  Review of Systems   Constitutional: Negative for activity change and appetite change  HENT: Negative for congestion and ear pain  Eyes: Negative for pain  Respiratory: Negative for cough and shortness of breath  Cardiovascular: Negative for chest pain and palpitations  Gastrointestinal: Negative for abdominal distention, abdominal pain, blood in stool, constipation, diarrhea and nausea  Genitourinary: Negative for difficulty urinating and dysuria  Musculoskeletal: Negative for arthralgias and myalgias  Skin: Negative for rash  Allergic/Immunologic: Negative for immunocompromised state  Neurological: Negative for dizziness and headaches  Hematological: Negative for adenopathy  Does not bruise/bleed easily  Psychiatric/Behavioral: Negative for agitation  The patient is not nervous/anxious  Objective:  Vitals: Blood pressure 109/72, pulse 76, temperature (!) 97 3 °F (36 3 °C), temperature source Tympanic, resp  rate 15, height 5' 7" (1 702 m), weight 68 3 kg (150 lb 9 2 oz), SpO2 95 %  ,Body mass index is 23 58 kg/m²  Intake/Output Summary (Last 24 hours) at 9/20/2019 1312  Last data filed at 9/20/2019 0801  Gross per 24 hour   Intake    Output 1850 ml   Net -1850 ml     Invasive Devices     Central Venous Catheter Line            CVC Central Lines 09/14/19 Triple Right Subclavian 5 days          Drain            Colostomy Descending/sigmoid LLQ -- days    Suprapubic Catheter Non-latex 16 Fr  less than 1 day                Physical Exam   Constitutional: He is oriented to person, place, and time  He appears well-developed and well-nourished  He is cooperative  He does not appear ill  No distress  Pleasant 81-year-old paraplegic male with right cataract  HENT:   Head: Normocephalic and atraumatic  Moist mucous membranes  Eyes: Conjunctivae and EOM are normal    Neck: Normal range of motion  Neck supple  No tracheal deviation present     Cardiovascular: Normal rate, regular rhythm and normal heart sounds  No murmur heard  Pulmonary/Chest: Effort normal and breath sounds normal  No respiratory distress  He has no wheezes  Good airflow bilaterally on deep inspiration  Abdominal: Soft  Bowel sounds are normal  He exhibits no distension and no mass  There is no tenderness  Abdomen distended, ostomy pink and viable with soft liquid stool in the bag  Genitourinary:   Genitourinary Comments: Suprapubic catheter draining clear yellow urine   Musculoskeletal: Normal range of motion  He exhibits no edema  Neurological: He is alert and oriented to person, place, and time  Skin: No rash noted  He is not diaphoretic  No erythema  No pallor  Sacral dressings left intact   Psychiatric: He has a normal mood and affect  His behavior is normal  Judgment and thought content normal    Nursing note and vitals reviewed        History:    Past Medical History:   Diagnosis Date    Anemia     Blind     r eye    Chronic cystitis     Colostomy in place [facility])     Detrusor sphincter dyssynergia     Diabetes mellitus ([facility] [address] )     Poorly controlled type 2; Last Assessed:  3/18/14    Erectile dysfunction     Frequency of urination     GERD (gastroesophageal reflux disease)     History of diabetes mellitus     History of osteomyelitis     Hx of leg amputation (HCC)     r high upper leg    Hyperlipidemia     Hypertension     Hypospadias     Incomplete bladder emptying     Neurogenic bladder     Paralysis ([address] )     [facility]aplegia ([facility])     Spinal cord cysts     Ulcer of sacral region ([facility] [address] )     Urge incontinence      Past Surgical History:   Procedure Laterality Date    AMPUTATION      At hip; Last Assessed:  1/19/16    BLADDER SURGERY      COLON SURGERY      llq ostomy pouch    COLOSTOMY      COMPLEX CYSTOMETROGRAM  2014    CT CYSTOGRAM  9/19/2019    CYSTOSCOPY  2014    LEG AMPUTATION      MEATOTOMY      PENILE PROSTHESIS IMPLANT  2011    CA ADJ TODD Humphries SCALP,EXTREM 10 1-30 SQCM Left 2017    Procedure: POSTERIOR THIGH V-Y ADMANCEMENT;  Surgeon: Cassidy Chun MD;  [facility];  Service: Plastics    OH MUSCLE-SKIN FLAP,TRUNK Left 2017    Procedure: FLAP CLOSURE LEFT ISCHIAL WOUND and "RIGHT" ISCHIAL FLAP ADVANCEMENT * DEBRIDEMENT, VAC PLACEMENT ;  Surgeon: Cassidy Chun MD;  [facility];  Service: Plastics    OH MUSCLE-SKIN FLAP,TRUNK Left 2017    Procedure: gluteal myocutaneous rotational flap, posterior thigh v to y advancement- wound 5 x 2 5 x 8;  Surgeon: Cassidy Chun MD;  [facility];  Service: Plastics    SPINE SURGERY      Lower back    UROFLOWMETRY SIMPLE / COMPLEX       Family History   Problem Relation Age of Onset    No Known Problems Mother     No Known Problems Father      Social History     Socioeconomic History    Marital status: /Civil Union     Spouse name: Not on file    Number of children: Not on file    Years of education: Not on file    Highest education level: Not on file   Occupational History    Not on file   Social Needs    Financial resource strain: Not on file    Food insecurity:     Worry: Not on file     Inability: Not on file    Transportation needs:     Medical: Not on file     Non-medical: Not on file   Tobacco Use    Smoking status: Former Smoker     Packs/day: 0 50     Years: 10 00     Pack years: 5 00     Last attempt to quit:      Years since quittin 7    Smokeless tobacco: Never Used    Tobacco comment: Onset date:  11/10/17   Substance and Sexual Activity    Alcohol use: Never     Frequency: Never     Comment: Per [employer]:  Social drinker (Onset date:  11/10/17)    Drug use: No    Sexual activity: Not on file   Lifestyle    Physical activity:     Days per week: Not on file     Minutes per session: Not on file    Stress: Not on file   Relationships    Social connections:     Talks on phone: Not on file     Gets together: Not on file     Attends Shinto service: Not on file     Active member of club or organization: Not on file     Attends meetings of clubs or organizations: Not on file     Relationship status: Not on file    Intimate partner violence:     Fear of current or ex partner: Not on file     Emotionally abused: Not on file     Physically abused: Not on file     Forced sexual activity: Not on file   Other Topics Concern    Not on file   Social History Narrative    [place] [address] [place] [address] Dr Ontiveros    Social history reviewed, unchanged       Labs:  Recent Labs     09/18/19  0615 09/19/19  0627 09/20/19  0551   WBC 9 12 8 98 6 68     Recent Labs     09/18/19  0615 09/19/19  0627 09/20/19  0551   HGB 8 6* 7 6* 7 6*       Recent Labs     09/18/19  0615 09/19/19  0627 09/20/19  0551   CREATININE 0 46* 0 48* 0 52*         Jean Velasquez PA-C  Date: 9/20/2019 Time: 1:12 PM

## 2019-09-20 NOTE — PROGRESS NOTES
Progress Note - Infectious Disease   Kalina Carmona Moscat 63 y o  male MRN: [id number]  Unit/Bed#: E2 -01 Encounter: [id number]    Impression/Plan:  1  Sepsis  POA  Fever, tachycardia, leukocytosis   Likely secondary to complicated urinary tract infection in the setting of patient with urinary retention  [place] culture unfortunately showed fairly resistant polymicrobial results of Pseudomonas aeruginosa, E coli ESBL, MRSA, Enterococcus faecalis, and alpha hemolytic Streptococcus   Kidneys did not appear abnormal on patient's initial CT scan   Now patient with increased discharge from his chronic decubitus ulcerations   New CT of the abdomen and pelvis was concerning for a peritoneal abscess   There is also concern that decubitus ulcer is extending anteriorly into the perineum and urethra around his penile prosthesis  Solomon catheter has eroded out of urethra  I suspect patient would benefit from surgical debridement of the wound, drainage of the abscess, and immediate removal of his penile prosthesis   Without source control the patient's bacterial infection will likely continue to progress and he may develop additional antibiotic resistance   -antibiotics as below  -check CBCD and BMP tomorrow  -monitor vitals  -supportive care  -continue follow-up with general surgery  -continue follow-up with urology     2  Complicated urinary tract infection  Likely related to patient having difficulty with self catheterization   Also consider possible communication of his decubitus ulcers and urethra  Solomon has eroded through the urethra  Also consider penile implant as possible source  Urine culture unfortunately showed fairly resistant polymicrobial results of Pseudomonas aeruginosa, E coli ESBL, MRSA, Enterococcus faecalis, and alpha hemolytic Streptococcus  Patient is now status post urinary diversion with a suprapubic tube   Concern that he still doesn't have adequate source control given his penile implant, urethral erosion, and perineal abscess  He is tolerating IV Zosyn and IV vancomycin without difficulty  Will need to continue IV antibiotics as he has no oral options   -continue IV Zosyn  -continue IV vancomycin  -continue pharmacy consult for guidance on vancomycin dosage  -check CBCD and BMP tomorrow  -monitor vitals  -monitor urine output  -serial Solomon catheter care and exams  -continue close follow-up with urology     3  Coag-negative Staphylococcus bacteremia  Showing in one set of his initial blood cultures  I suspect this is representative of skin contamination and not in active bacteremia   Patient is clinically stable without fever or leukocytosis   Repeat blood cultures are negative after 48 hours   -no indication for antibiotics for this issue  -monitor CBC and BMP  -follow up repeat blood cultures  -monitor vitals     4  Chronic decubitus ulcers  POA  Now with development of an abscess and concern that wound may be communicating with perineum and urethra   Concern penile implant may be a source of bacteria as well  Patient will likely need debridement of his wound and drainage of the abscess   I recommend removing the penile implant at this time as his bacterial infection will likely progress if there is not adequate source control   He is following closely with General surgery and urology who are discussing surgical options   -serial wound exams  -local wound care per wound management team  -continue close follow-up with wound management  -continue close follow-up with general surgery  -continue close follow-up with urology     5  Penile implant   Implant is not functioning  Elin Brown may be contributing to his aggressive bacterial infection above   I recommend removing the penile implant as soon as possible as he will likely have ongoing bacterial infections if there is not adequate source control   -continue close follow-up with urology     6  Scrotal excoriations   Patient with multiple superficial erythematous excoriations on the base of the scrotum as well as along the penis   These areas have a base of pink fibrous tissue and are not weeping  Patient unable to feel pain due to his paraplegia   Scrotum has no palpable fluctuance  He is following closely with wound management   -serial scrotum exams  -continue close follow-up with wound management     7  Paraplegia    -supportive care      8  History of C diff   Patient noted with prior history of C diff which is not documented in our system   He denies having any increased to his ostomy output   He has no ongoing abdominal pain or cramping   He is receiving PO Vancomycin for prophylaxis and is tolerating without difficulty  -continue PO vancomycin prophylaxis  -monitor abdominal symptoms  -monitor stool output in ostomy bag    Above plan was discussed in detail with patient bedside  Above plan was discussed in detail with Urology Corrina BACA  Antibiotics:  Zosyn 3  Vancomycin 3  PO vancomycin 5  Antibiotics 7    Subjective:  Patient tells me he is feeling fine today  Has no new symptoms to report  He denies fever, chills, sweats, shakes; no nausea, vomiting, abdominal pain; no cough, shortness of breath, or chest pain  No concerns with his new suprapubic tube  He denies pain in his buttocks sacral wounds  Objective:  Vitals:  Temp:  [97 3 °F (36 3 °C)-99 °F (37 2 °C)] 97 3 °F (36 3 °C)  HR:  [57-97] 76  Resp:  [15-20] 15  BP: (106-146)/(61-87) 109/72  SpO2:  [95 %-100 %] 95 %  Temp (24hrs), Av 3 °F (36 8 °C), Min:97 3 °F (36 3 °C), Max:99 °F (37 2 °C)  Current: Temperature: (!) 97 3 °F (36 3 °C)    Physical Exam:   General Appearance:  Alert, interactive, nontoxic, no acute distress  Patient appears chronically debilitated  Throat: Oropharynx moist without lesions      Lungs:   Clear to auscultation bilaterally; no wheezes, rhonchi or rales; respirations unlabored   Heart:  RRR; no murmur, rub or gallop   Abdomen: Soft, non-tender, non-distended, positive bowel sounds  Ostomy pouch remains, stool output is thin and brown   (see assessment suprapubic catheter below)   Genitourinary: Suprapubic catheter now intact, site has no leakage or spreading erythema; extensive scrotal excoriation continues but there is no active bleeding/drainage   Extremities: No clubbing or cyanosis, no LLE edema; R stump healed   Skin: No new rashes, lesions, or draining wounds noted on exposed skin  Allyvn foam and buttock wound packing intact, did not remove to assess further     Labs, Imaging, & Other studies:   All pertinent labs and imaging studies were personally reviewed  Results from last 7 days   Lab Units 09/20/19  0551 09/19/19  0627 09/18/19  0615   WBC Thousand/uL 6 68 8 98 9 12   HEMOGLOBIN g/dL 7 6* 7 6* 8 6*   PLATELETS Thousands/uL 540* 497* 499*     Results from last 7 days   Lab Units 09/20/19  0551  09/17/19  0456  09/14/19  2201   POTASSIUM mmol/L 3 5   < > 3 6   < > 3 3*   CHLORIDE mmol/L 105   < > 101   < > 98   CO2 mmol/L 28   < > 28   < > 27   BUN mg/dL 8   < > 7   < > 18   CREATININE mg/dL 0 52*   < > 0 51*   < > 0 50*   EGFR ml/min/1 73sq m 118   < > 118   < > 119   CALCIUM mg/dL 8 5   < > 9 0   < > 8 4   AST U/L  --   --  21  --  19   ALT U/L  --   --  18  --  8*   ALK PHOS U/L  --   --  70  --  95    < > = values in this interval not displayed  Results from last 7 days   Lab Units 09/17/19  0459 09/17/19  0349 09/16/19  1000 09/14/19  2202 09/14/19  2201 09/14/19  1943   BLOOD CULTURE  No Growth at 72 hrs  No Growth at 72 hrs  No Growth at 72 hrs  Staphylococcus coagulase negative* No Growth After 4 Days    --    GRAM STAIN RESULT   --   --   --  Gram positive cocci in clusters*  --   --    URINE CULTURE   --   --   --   --   --  20,000-29,000 cfu/ml Pseudomonas aeruginosa*  10,000-19,000 cfu/ml Escherichia coli ESBL*  <10,000 cfu/ml Methicillin Resistant Staphylococcus aureus*  10,000-19,000 cfu/ml Enterococcus faecalis*  40,000-49,000 cfu/ml Alpha Hemolytic Streptococcus NOT Enterococcus*

## 2019-09-20 NOTE — ASSESSMENT & PLAN NOTE
CT 9/17/19 showed "Left-sided decubitus ulcer extending to the left pubic bone/fascia and into the peritoneum unchanged in size from the prior studies  There is an abscess adjacent to the peritoneal component measuring 4 8 x 2 4 x 4 8 cm "  General surgery on board, appreciate the recommendations  Wound was evaluated by them and a Solomon was visible through the sacral wound  The abscess is most likely draining  Per surgery there is not much necrotic tissue for debridement  CT cystogram showed no urine extravasation  Suprapubic catheter placed by IR 9/19/19     Continue vanc and Zosyn

## 2019-09-20 NOTE — ASSESSMENT & PLAN NOTE
Urinary tract infection secondary to neurogenic bladder/urinary retention/self catheterizations/bladder outlet obstruction   Urine culture from 09/13/2019 growing E coli ESBL  Repeat urine culture from 09/14/2019 coagulase negative Staph  Evaluated by Infectious Disease, appreciate recommendations  Patient was transferred from [address] on Ceftriaxone  ID changed ceftriaxone to [facility] 9/16/19  Due to new finding of abscess, antibiotics changed to Vancomycin and Zosyn

## 2019-09-20 NOTE — PROGRESS NOTES
Progress Note - Rafy Blare 1960, 63 y o  male MRN: [id number]    Unit/Bed#: E2 -01 Encounter: [id number]    Primary Care Provider: Thor Siu MD   Date and time admitted to hospital: 9/16/2019 11:44 AM        * Complicated urinary tract infection  Assessment & Plan  Urinary tract infection secondary to neurogenic bladder/urinary retention/self catheterizations/bladder outlet obstruction   Urine culture from 09/13/2019 growing E coli ESBL  Repeat urine culture from 09/14/2019 coagulase negative Staph  Evaluated by Infectious Disease, appreciate recommendations  Patient was transferred from [facility] on Ceftriaxone  ID changed ceftriaxone to [facility] 9/16/19  Due to new finding of abscess, antibiotics changed to Vancomycin and Zosyn  Abscess  Assessment & Plan  CT 9/17/19 showed "Left-sided decubitus ulcer extending to the left pubic bone/fascia and into the peritoneum unchanged in size from the prior studies  There is an abscess adjacent to the peritoneal component measuring 4 8 x 2 4 x 4 8 cm "  General surgery on board, appreciate the recommendations  Wound was evaluated by them and a Solomon was visible through the sacral wound  The abscess is most likely draining  Per surgery there is not much necrotic tissue for debridement  CT cystogram showed no urine extravasation  Suprapubic catheter placed by IR 9/19/19  Continue vanc and Zosyn       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[facility])  Assessment & Plan  Patient has a nonfunctional penile prosthesis  Possible infection of the penile prosthesis  Evaluated by Urology on the previous admission 08/23/2019, it was recommended for it to be removed, patient refused, wanted to do it as outpatient  CT abd 9/14/19 "Small amount of fluid within the left perirectal/mesorectal space, likely residual to prior infection, without significant evidence of a deep pelvic and perineal soft tissue infection as seen on prior exam of 8/23/2019    Residual focus of air seen along the right inferior pubic rami, which may be ulcer related"  Possible removal of the implant during this admission  Bacteremia  Assessment & Plan  One set of blood cultures taken 09/14/2019 growing coagulase-negative Staph, most likely contaminate  Repeated blood cultures 9/17 negative so far    Paraplegic spinal paralysis [facility])  Assessment & Plan  Secondary to spinal cyst  Frequent repositioning      History of Clostridium difficile colitis  Assessment & Plan  Patient with a history of C diff  Per ID continue oral vancomycin prophylaxis  Monitor ostomy output    Stage IV pressure ulcer of sacral region [facility])  Assessment & Plan  Stage 4 pressure ulcer on the left distal buttock  Frequent repositioning  Wound Care on board  See plan above    Colostomy in place [facility])  Assessment & Plan  Secondary to chronic sacral ulcers    Neurogenic bladder  Assessment & Plan  Suprapubic catheter placed 9/19/19    Stage II pressure ulcer of buttock  Assessment & Plan  Stage II pressure ulcer on the right distal buttock, present on admission  Frequent repositioning  Wound Care on board    Opioid use  Assessment & Plan  PDMP reviewed  Continue Oxy 20 mg q 8h p r n  Diabetes mellitus type II, controlled [facility])  Assessment & Plan  Lab Results   Component Value Date    HGBA1C 5 2 05/06/2019       No results for input(s): POCGLU in the last 72 hours  Blood Sugar Average: Last 72 hrs:   diet controlled  Last hemoglobin A1c 05/06/2019 was 5 2  Continue to monitor    Anemia  Assessment & Plan  Baseline around 8 5-9  Hemoglobin today 7 6  No signs of active bleed  Continue to monitor, transfuse if less than 7  Continue ferrous sulfate and vitamin-C        VTE Pharmacologic Prophylaxis:   Pharmacologic: Enoxaparin (Lovenox)  Mechanical VTE Prophylaxis in Place: No    Patient Centered Rounds: I have performed bedside rounds with nursing staff today      Discussions with Specialists or Other Care Team Provider: Urology, Surgery, ID    Education and Discussions with Family / Patient: patient    Time Spent for Care: 30 minutes  More than 50% of total time spent on counseling and coordination of care as described above  Current Length of Stay: 4 day(s)    Current Patient Status: Inpatient   Certification Statement: The patient will continue to require additional inpatient hospital stay due to Pending decision about debridement of the sacral wound, penile implant removal     Discharge Plan: keep inpatient    Code Status: Level 1 - Full Code      Subjective:   Patient was seen and examined this morning at bedside  With nursing staff  He denies any issues  Objective:     Vitals:   Temp (24hrs), Av 2 °F (36 8 °C), Min:97 3 °F (36 3 °C), Max:99 °F (37 2 °C)    Temp:  [97 3 °F (36 3 °C)-99 °F (37 2 °C)] 97 3 °F (36 3 °C)  HR:  [57-97] 76  Resp:  [15-20] 15  BP: (109-146)/(67-87) 109/72  SpO2:  [95 %-100 %] 95 %  Body mass index is 23 58 kg/m²  Input and Output Summary (last 24 hours): Intake/Output Summary (Last 24 hours) at 2019 1555  Last data filed at 2019 0801  Gross per 24 hour   Intake    Output 1725 ml   Net -1725 ml       Physical Exam:     Physical Exam   Constitutional: He is oriented to person, place, and time  Looks chronically ill, no acute distress   HENT:   Head: Normocephalic and atraumatic  Blind in the right eye   Neck: Normal range of motion  Cardiovascular: Normal rate, regular rhythm and normal heart sounds  Exam reveals no gallop and no friction rub  No murmur heard  Pulmonary/Chest: Effort normal and breath sounds normal  No respiratory distress  He has no wheezes  Abdominal: Soft  Bowel sounds are normal    Colostomy and suprapubic catheter in place   Musculoskeletal:   Right above-the-knee amputation   Neurological: He is alert and oriented to person, place, and time  No cranial nerve deficit  Skin:   Right IJ   Psychiatric: He has a normal mood and affect  Additional Data:     Labs:    Results from last 7 days   Lab Units 09/20/19  0551   WBC Thousand/uL 6 68   HEMOGLOBIN g/dL 7 6*   HEMATOCRIT % 26 2*   PLATELETS Thousands/uL 540*   NEUTROS PCT % 70   LYMPHS PCT % 18   MONOS PCT % 6   EOS PCT % 5     Results from last 7 days   Lab Units 09/20/19  0551  09/17/19  0456   SODIUM mmol/L 140   < > 137   POTASSIUM mmol/L 3 5   < > 3 6   CHLORIDE mmol/L 105   < > 101   CO2 mmol/L 28   < > 28   BUN mg/dL 8   < > 7   CREATININE mg/dL 0 52*   < > 0 51*   ANION GAP mmol/L 7   < > 8   CALCIUM mg/dL 8 5   < > 9 0   ALBUMIN g/dL  --   --  1 6*   TOTAL BILIRUBIN mg/dL  --   --  0 21   ALK PHOS U/L  --   --  70   ALT U/L  --   --  18   AST U/L  --   --  21   GLUCOSE RANDOM mg/dL 72   < > 87    < > = values in this interval not displayed  Results from last 7 days   Lab Units 09/14/19  2201   INR  1 15*     Results from last 7 days   Lab Units 09/15/19  1132 09/15/19  0603 09/15/19  0114   POC GLUCOSE mg/dl 95 97 157*         Results from last 7 days   Lab Units 09/17/19  0456 09/15/19  0505 09/14/19  2201   LACTIC ACID mmol/L  --   --  0 8   PROCALCITONIN ng/ml 0 22 0 72* 0 76*           * I Have Reviewed All Lab Data Listed Above  * Additional Pertinent Lab Tests Reviewed: Olivia 66 Admission Reviewed    Imaging:    Imaging Reports Reviewed Today Include: all  Imaging Personally Reviewed by Myself Includes:      Recent Cultures (last 7 days):     Results from last 7 days   Lab Units 09/17/19  0459 09/17/19  0349 09/16/19  1000 09/14/19  2202 09/14/19  2201 09/14/19  1943   BLOOD CULTURE  No Growth at 72 hrs  No Growth at 72 hrs  No Growth After 4 Days  Staphylococcus coagulase negative* No Growth After 5 Days    --    GRAM STAIN RESULT   --   --   --  Gram positive cocci in clusters*  --   --    URINE CULTURE   --   --   --   --   --  20,000-29,000 cfu/ml Pseudomonas aeruginosa*  10,000-19,000 cfu/ml Escherichia coli ESBL*  <10,000 cfu/ml Methicillin Resistant Staphylococcus aureus*  10,000-19,000 cfu/ml Enterococcus faecalis*  40,000-49,000 cfu/ml Alpha Hemolytic Streptococcus NOT Enterococcus*       Last 24 Hours Medication List:     Current Facility-Administered Medications:  acetaminophen 650 mg Oral Q6H PRN Odalis Walker PA-C    ascorbic acid 500 mg Oral Daily With Breakfast Shelton Roper MD    aspirin 81 mg Oral Daily Shelton Roper MD    enoxaparin 40 mg Subcutaneous Daily Shelton Roper MD    ferrous sulfate 325 mg Oral Daily With Breakfast Shelton Roper MD    folic acid 533 mcg Oral Daily Shelton Roper MD    iothalamate meglumine 15 mL Bladder Instillation Once in imaging Oksanamarcelo Ladd PA-C    oxyCODONE 20 mg Oral Q8H PRN Shelton Roper MD    piperacillin-tazobactam 4 5 g Intravenous Q6H Ele Alicia MD Last Rate: 4 5 g (09/20/19 1544)   saccharomyces boulardii 250 mg Oral BID Shelton Roper MD    vancomycin 1,500 mg Intravenous Q12H Ele Alicia MD    vancomycin 125 mg Oral Q12H Kenisha Kay MD         Today, Patient Was Seen By: Shelton Roper MD    ** Please Note: Dictation voice to text software may have been used in the creation of this document   **

## 2019-09-21 LAB
ANION GAP SERPL CALCULATED.3IONS-SCNC: 8 MMOL/L (ref 4–13)
BACTERIA BLD CULT: NORMAL
BASOPHILS # BLD AUTO: 0.04 THOUSANDS/ΜL (ref 0–0.1)
BASOPHILS NFR BLD AUTO: 1 % (ref 0–1)
BUN SERPL-MCNC: 12 MG/DL (ref 5–25)
CALCIUM SERPL-MCNC: 8.8 MG/DL (ref 8.3–10.1)
CHLORIDE SERPL-SCNC: 105 MMOL/L (ref 100–108)
CO2 SERPL-SCNC: 28 MMOL/L (ref 21–32)
CREAT SERPL-MCNC: 0.56 MG/DL (ref 0.6–1.3)
EOSINOPHIL # BLD AUTO: 0.37 THOUSAND/ΜL (ref 0–0.61)
EOSINOPHIL NFR BLD AUTO: 5 % (ref 0–6)
ERYTHROCYTE [DISTWIDTH] IN BLOOD BY AUTOMATED COUNT: 16.9 % (ref 11.6–15.1)
GFR SERPL CREATININE-BSD FRML MDRD: 114 ML/MIN/1.73SQ M
GLUCOSE SERPL-MCNC: 76 MG/DL (ref 65–140)
HCT VFR BLD AUTO: 26.7 % (ref 36.5–49.3)
HGB BLD-MCNC: 7.7 G/DL (ref 12–17)
IMM GRANULOCYTES # BLD AUTO: 0.02 THOUSAND/UL (ref 0–0.2)
IMM GRANULOCYTES NFR BLD AUTO: 0 % (ref 0–2)
LYMPHOCYTES # BLD AUTO: 1.66 THOUSANDS/ΜL (ref 0.6–4.47)
LYMPHOCYTES NFR BLD AUTO: 23 % (ref 14–44)
MCH RBC QN AUTO: 23.5 PG (ref 26.8–34.3)
MCHC RBC AUTO-ENTMCNC: 28.8 G/DL (ref 31.4–37.4)
MCV RBC AUTO: 82 FL (ref 82–98)
MONOCYTES # BLD AUTO: 0.47 THOUSAND/ΜL (ref 0.17–1.22)
MONOCYTES NFR BLD AUTO: 7 % (ref 4–12)
NEUTROPHILS # BLD AUTO: 4.68 THOUSANDS/ΜL (ref 1.85–7.62)
NEUTS SEG NFR BLD AUTO: 64 % (ref 43–75)
NRBC BLD AUTO-RTO: 0 /100 WBCS
PLATELET # BLD AUTO: 550 THOUSANDS/UL (ref 149–390)
PMV BLD AUTO: 9.6 FL (ref 8.9–12.7)
POTASSIUM SERPL-SCNC: 3.6 MMOL/L (ref 3.5–5.3)
RBC # BLD AUTO: 3.27 MILLION/UL (ref 3.88–5.62)
SODIUM SERPL-SCNC: 141 MMOL/L (ref 136–145)
WBC # BLD AUTO: 7.24 THOUSAND/UL (ref 4.31–10.16)

## 2019-09-21 PROCEDURE — 99232 SBSQ HOSP IP/OBS MODERATE 35: CPT | Performed by: SURGERY

## 2019-09-21 PROCEDURE — 85025 COMPLETE CBC W/AUTO DIFF WBC: CPT | Performed by: INTERNAL MEDICINE

## 2019-09-21 PROCEDURE — 99231 SBSQ HOSP IP/OBS SF/LOW 25: CPT | Performed by: UROLOGY

## 2019-09-21 PROCEDURE — 99232 SBSQ HOSP IP/OBS MODERATE 35: CPT | Performed by: INTERNAL MEDICINE

## 2019-09-21 PROCEDURE — 80048 BASIC METABOLIC PNL TOTAL CA: CPT | Performed by: INTERNAL MEDICINE

## 2019-09-21 PROCEDURE — 99233 SBSQ HOSP IP/OBS HIGH 50: CPT | Performed by: INTERNAL MEDICINE

## 2019-09-21 RX ORDER — DOXYCYCLINE HYCLATE 100 MG/1
100 CAPSULE ORAL EVERY 12 HOURS SCHEDULED
Status: DISCONTINUED | OUTPATIENT
Start: 2019-09-21 | End: 2019-09-25 | Stop reason: HOSPADM

## 2019-09-21 RX ADMIN — OXYCODONE HYDROCHLORIDE AND ACETAMINOPHEN 500 MG: 500 TABLET ORAL at 08:47

## 2019-09-21 RX ADMIN — ALTEPLASE 2 MG: 2.2 INJECTION, POWDER, LYOPHILIZED, FOR SOLUTION INTRAVENOUS at 14:02

## 2019-09-21 RX ADMIN — FERROUS SULFATE TAB 325 MG (65 MG ELEMENTAL FE) 325 MG: 325 (65 FE) TAB at 08:46

## 2019-09-21 RX ADMIN — FOLIC ACID 500 MCG: 1 TABLET ORAL at 08:47

## 2019-09-21 RX ADMIN — PIPERACILLIN AND TAZOBACTAM 4.5 G: 4; .5 INJECTION, POWDER, LYOPHILIZED, FOR SOLUTION INTRAVENOUS at 08:51

## 2019-09-21 RX ADMIN — Medication 250 MG: at 08:48

## 2019-09-21 RX ADMIN — OXYCODONE HYDROCHLORIDE 20 MG: 10 TABLET ORAL at 16:20

## 2019-09-21 RX ADMIN — OXYCODONE HYDROCHLORIDE 20 MG: 10 TABLET ORAL at 08:41

## 2019-09-21 RX ADMIN — VANCOMYCIN HYDROCHLORIDE 1500 MG: 5 INJECTION, POWDER, LYOPHILIZED, FOR SOLUTION INTRAVENOUS at 13:52

## 2019-09-21 RX ADMIN — DOXYCYCLINE 100 MG: 100 CAPSULE ORAL at 22:06

## 2019-09-21 RX ADMIN — MEROPENEM 1000 MG: 1 INJECTION, POWDER, FOR SOLUTION INTRAVENOUS at 22:06

## 2019-09-21 RX ADMIN — ASPIRIN 81 MG: 81 TABLET, COATED ORAL at 08:47

## 2019-09-21 RX ADMIN — MEROPENEM 1000 MG: 1 INJECTION, POWDER, FOR SOLUTION INTRAVENOUS at 16:25

## 2019-09-21 RX ADMIN — ALTEPLASE 2 MG: 2.2 INJECTION, POWDER, LYOPHILIZED, FOR SOLUTION INTRAVENOUS at 14:29

## 2019-09-21 RX ADMIN — DOXYCYCLINE 100 MG: 100 CAPSULE ORAL at 14:44

## 2019-09-21 RX ADMIN — Medication 125 MG: at 08:50

## 2019-09-21 RX ADMIN — ENOXAPARIN SODIUM 40 MG: 40 INJECTION SUBCUTANEOUS at 09:08

## 2019-09-21 RX ADMIN — PIPERACILLIN AND TAZOBACTAM 4.5 G: 4; .5 INJECTION, POWDER, LYOPHILIZED, FOR SOLUTION INTRAVENOUS at 03:48

## 2019-09-21 RX ADMIN — Medication 125 MG: at 22:06

## 2019-09-21 RX ADMIN — Medication 250 MG: at 17:35

## 2019-09-21 NOTE — PROGRESS NOTES
Progress Note Cristian Heller 1960, 63 y o  male MRN: [id number]    Unit/Bed#: E2 -01 Encounter: [id number]    Primary Care Provider: Zarina Seo MD   Date and time admitted to hospital: 9/16/2019 11:44 AM        Stage IV pressure ulcer of sacral region [facility])  Assessment & Plan  Stage 4 pressure ulcer on the left distal buttock  Evaluated by surgery and urology today  Per surgery, wound has no evidence of necrotic tissue or loculated collections, there was some drainage  Will most likely need plastic surgery for flap reconstruction  No urgent surgical intervention needed at this time  Suprapubic catheter in place  Wound was also examined by Urology  There is no connection from wounds to penile implant  Continue wound care  No indication for removal of prosthesis at present  Evaluated by ID  Suspicion remains high for penile implant involvement  Since there is no plan for any surgical intervention at this point, will treat it as complicated wound infection  Changed antibiotics to doxycycline and meropenem  for total of 2 weeks  Will need a PICC line  Frequent repositioning  Wound Care on board      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[facility])  Assessment & Plan  Patient has a nonfunctional penile prosthesis  Evaluated by Urology on the previous admission 08/23/2019, it was recommended for it to be removed, patient refused, wanted to do it as outpatient  At this point Urology does not think there is infection of the penile implant and no surgery planned at this point    * Complicated urinary tract infection  Assessment & Plan  Urinary tract infection secondary to neurogenic bladder/urinary retention/self catheterizations/bladder outlet obstruction   Urine culture from 09/13/2019 growing E coli ESBL  Repeat urine culture from 09/14/2019 coagulase negative Staph  Evaluated by Infectious Disease, appreciate recommendations  Patient was transferred from [facility] on Ceftriaxone    ID changed ceftriaxone to [facility] 9/16/19  Switched to Vancomycin and Zosyn  Today antibiotics changed to Doxy and Meropenem for treatment of complicated wound infection  Bacteremia  Assessment & Plan  One set of blood cultures taken 09/14/2019 growing coagulase-negative Staph, most likely contaminate  Repeated blood cultures 9/17 negative so far    Paraplegic spinal paralysis [facility])  Assessment & Plan  Secondary to spinal cyst  Frequent repositioning      History of Clostridium difficile colitis  Assessment & Plan  Patient with a history of C diff  Per ID continue oral vancomycin prophylaxis  Monitor ostomy output    Colostomy in place [facility])  Assessment & Plan  Secondary to chronic sacral ulcers    Neurogenic bladder  Assessment & Plan  Suprapubic catheter placed 9/19/19    Stage II pressure ulcer of buttock  Assessment & Plan  Stage II pressure ulcer on the right distal buttock, present on admission  Frequent repositioning  Wound Care on board    Opioid use  Assessment & Plan  PDMP reviewed  Continue Oxy 20 mg q 8h p r n  Diabetes mellitus type II, controlled [facility])  Assessment & Plan  Lab Results   Component Value Date    HGBA1C 5 2 05/06/2019       No results for input(s): POCGLU in the last 72 hours  Blood Sugar Average: Last 72 hrs:   diet controlled  Last hemoglobin A1c 05/06/2019 was 5 2  Continue to monitor    Anemia  Assessment & Plan  Baseline around 8 5-9  Hemoglobin today 7 7  No signs of active bleed  Continue to monitor, transfuse if less than 7  Continue ferrous sulfate and vitamin-C      VTE Pharmacologic Prophylaxis:   Pharmacologic: Enoxaparin (Lovenox)  Mechanical VTE Prophylaxis in Place: No    Patient Centered Rounds: I have performed bedside rounds with nursing staff today  Discussions with Specialists or Other Care Team Provider: ID    Education and Discussions with Family / Patient: patient    Time Spent for Care: 30 minutes    More than 50% of total time spent on counseling and coordination of care as described above  Current Length of Stay: 5 day(s)    Current Patient Status: Inpatient   Certification Statement: The patient will continue to require additional inpatient hospital stay due to Pending decision about wound treatment    Discharge Plan: keep inpatient    Code Status: Level 1 - Full Code      Subjective:   Patient was seen and evaluated at bedside this morning  He is feeling well, has no complain    Objective:     Vitals:   Temp (24hrs), Av 4 °F (36 9 °C), Min:97 9 °F (36 6 °C), Max:98 9 °F (37 2 °C)    Temp:  [97 9 °F (36 6 °C)-98 9 °F (37 2 °C)] 98 9 °F (37 2 °C)  HR:  [65-83] 83  Resp:  [18] 18  BP: (105-129)/(69-87) 105/69  SpO2:  [97 %-99 %] 99 %  Body mass index is 23 58 kg/m²  Input and Output Summary (last 24 hours): Intake/Output Summary (Last 24 hours) at 2019 1829  Last data filed at 2019 1812  Gross per 24 hour   Intake    Output 3300 ml   Net -3300 ml       Physical Exam:     Physical Exam   Constitutional: He is oriented to person, place, and time  Chronically ill, no acute distress   HENT:   Head: Normocephalic and atraumatic  Eyes:   Blind on the right dye   Neck: Normal range of motion  Cardiovascular: Normal rate, regular rhythm and normal heart sounds  Exam reveals no gallop and no friction rub  No murmur heard  Pulmonary/Chest: Effort normal and breath sounds normal  No respiratory distress  He has no wheezes  Abdominal: Soft  Bowel sounds are normal  He exhibits no distension  Suprapubic cath and colostomy in place   Musculoskeletal: Normal range of motion  Neurological: He is alert and oriented to person, place, and time  No cranial nerve deficit  Skin:   Multiple a healed surgical scars in the sacral area, sacral wound, packed   Psychiatric: He has a normal mood and affect         Additional Data:     Labs:    Results from last 7 days   Lab Units 19  0619   WBC Thousand/uL 7 24   HEMOGLOBIN g/dL 7 7* HEMATOCRIT % 26 7*   PLATELETS Thousands/uL 550*   NEUTROS PCT % 64   LYMPHS PCT % 23   MONOS PCT % 7   EOS PCT % 5     Results from last 7 days   Lab Units 09/21/19  0619  09/17/19  0456   SODIUM mmol/L 141   < > 137   POTASSIUM mmol/L 3 6   < > 3 6   CHLORIDE mmol/L 105   < > 101   CO2 mmol/L 28   < > 28   BUN mg/dL 12   < > 7   CREATININE mg/dL 0 56*   < > 0 51*   ANION GAP mmol/L 8   < > 8   CALCIUM mg/dL 8 8   < > 9 0   ALBUMIN g/dL  --   --  1 6*   TOTAL BILIRUBIN mg/dL  --   --  0 21   ALK PHOS U/L  --   --  70   ALT U/L  --   --  18   AST U/L  --   --  21   GLUCOSE RANDOM mg/dL 76   < > 87    < > = values in this interval not displayed  Results from last 7 days   Lab Units 09/14/19  2201   INR  1 15*     Results from last 7 days   Lab Units 09/15/19  1132 09/15/19  0603 09/15/19  0114   POC GLUCOSE mg/dl 95 97 157*         Results from last 7 days   Lab Units 09/17/19  0456 09/15/19  0505 09/14/19  2201   LACTIC ACID mmol/L  --   --  0 8   PROCALCITONIN ng/ml 0 22 0 72* 0 76*           * I Have Reviewed All Lab Data Listed Above  * Additional Pertinent Lab Tests Reviewed: Olivia 66 Admission Reviewed    Imaging:    Imaging Reports Reviewed Today Include: all  Imaging Personally Reviewed by Myself Includes:      Recent Cultures (last 7 days):     Results from last 7 days   Lab Units 09/17/19  0459 09/17/19  0349 09/16/19  1000 09/14/19  2202 09/14/19  2201 09/14/19  1943   BLOOD CULTURE  No Growth After 4 Days  No Growth After 4 Days  No Growth After 5 Days  Staphylococcus coagulase negative* No Growth After 5 Days    --    GRAM STAIN RESULT   --   --   --  Gram positive cocci in clusters*  --   --    URINE CULTURE   --   --   --   --   --  20,000-29,000 cfu/ml Pseudomonas aeruginosa*  10,000-19,000 cfu/ml Escherichia coli ESBL*  <10,000 cfu/ml Methicillin Resistant Staphylococcus aureus*  10,000-19,000 cfu/ml Enterococcus faecalis*  40,000-49,000 cfu/ml Alpha Hemolytic Streptococcus NOT Enterococcus*       Last 24 Hours Medication List:     Current Facility-Administered Medications:  acetaminophen 650 mg Oral Q6H PRN Odalis Walker PA-C    ascorbic acid 500 mg Oral Daily With Breakfast Mir Benoit MD    aspirin 81 mg Oral Daily Mir Benoit MD    doxycycline hyclate 100 mg Oral Q12H [facility] Yamilex Barkley MD    enoxaparin 40 mg Subcutaneous Daily Mir Benoit MD    folic acid 981 mcg Oral Daily Mir Benoit MD    iothalamate meglumine 15 mL Bladder Instillation Once in imaging Geronimo Shaver PA-C    meropenem 1,000 mg Intravenous Q8H Yamilex Barkley MD Last Rate: 1,000 mg (09/21/19 1625)   oxyCODONE 20 mg Oral Q8H PRN Mir Benoit MD    saccharomyces boulardii 250 mg Oral BID Mir Benoit MD    vancomycin 125 mg Oral Q12H Narciso Holden MD         Today, Patient Was Seen By: Mir Benoit MD    ** Please Note: Dictation voice to text software may have been used in the creation of this document   **

## 2019-09-21 NOTE — ASSESSMENT & PLAN NOTE
Baseline around 8 5-9  Hemoglobin today 7 7  No signs of active bleed  Continue to monitor, transfuse if less than 7  Continue ferrous sulfate and vitamin-C

## 2019-09-21 NOTE — ASSESSMENT & PLAN NOTE
Patient has a nonfunctional penile prosthesis  Evaluated by Urology on the previous admission 08/23/2019, it was recommended for it to be removed, patient refused, wanted to do it as outpatient    At this point Urology does not think there is infection of the penile implant and no surgery planned at this point

## 2019-09-21 NOTE — PROGRESS NOTES
Wound examined and probed with Dr Yuval Duran, general surgery, see her note  No indication of any connection from wounds to implant  Solomon catheter is out, urethral will likely retracted stricture off  Continue wound care  No indication for removal of prosthesis at present

## 2019-09-21 NOTE — ASSESSMENT & PLAN NOTE
Urinary tract infection secondary to neurogenic bladder/urinary retention/self catheterizations/bladder outlet obstruction   Urine culture from 09/13/2019 growing E coli ESBL  Repeat urine culture from 09/14/2019 coagulase negative Staph  Evaluated by Infectious Disease, appreciate recommendations  Patient was transferred from [facility] on Ceftriaxone  ID changed ceftriaxone to [facility] 9/16/19  Switched to Vancomycin and Zosyn  Today antibiotics changed to Doxy and Meropenem for treatment of complicated wound infection

## 2019-09-21 NOTE — ASSESSMENT & PLAN NOTE
Stage 4 pressure ulcer on the left distal buttock  Evaluated by surgery and urology today  Per surgery, wound has no evidence of necrotic tissue or loculated collections, there was some drainage  Will most likely need plastic surgery for flap reconstruction  No urgent surgical intervention needed at this time  Suprapubic catheter in place  Wound was also examined by Urology  There is no connection from wounds to penile implant  Continue wound care  No indication for removal of prosthesis at present  Evaluated by ID  Suspicion remains high for penile implant involvement  Since there is no plan for any surgical intervention at this point, will treat it as complicated wound infection  Changed antibiotics to doxycycline and meropenem  for total of 2 weeks    Will need a PICC line  Frequent repositioning  Wound Care on board

## 2019-09-21 NOTE — CONSULTS
Vancomycin Monitoring    Demographics    Rikki Mayer   Assessment    Vancomycin has been d/c by [address] d/ced  Thanks!      Lexi Mc, MauriceD

## 2019-09-21 NOTE — PROGRESS NOTES
Progress Note - General Surgery   Remington Cisneros Moscat 63 y o  male MRN: [id number]  Unit/Bed#: E2 -01 Encounter: [id number]    Assessment:  Sacral wound    Plan:  Wound has no evidence of necrotic tissue or loculated collections, though there is some drainage  There is epithelialization of the entire pocket  He will likely need plastic surgery for flap reconstruction  He was evaluated with Dr Ryan Poe of urology  No urgent surgical intervention warranted at this time  Subjective/Objective     Subjective: No complaints  Objective:     Blood pressure 119/76, pulse 65, temperature 97 9 °F (36 6 °C), temperature source Tympanic, resp  rate 18, height 5' 7" (1 702 m), weight 68 3 kg (150 lb 9 2 oz), SpO2 97 %  ,Body mass index is 23 58 kg/m²  Intake/Output Summary (Last 24 hours) at 9/21/2019 0930  Last data filed at 9/21/2019 0617  Gross per 24 hour   Intake    Output 2050 ml   Net -2050 ml       Invasive Devices     Central Venous Catheter Line            CVC Central Lines 09/14/19 Triple Right Subclavian 6 days          Drain            Colostomy Descending/sigmoid LLQ -- days    Suprapubic Catheter Non-latex 16 Fr  1 day                Physical Exam:    General: No acute distress  HEENT: atraumatic, normocephalic, no adenopathy  Pulm: No respiratory distress  Cv: Regular rate and rhythm  Abd: Soft, nontender, nondistended  Colostomy present  Sacral wound extensive, undermines at least 10 cm superiorly and deep to sacral bone  Undermines laterally several centimeters  No necrotic tissue but extensive epithelialization of the cavity  There is some tan drainage        Lab, Imaging and other studies:  I have personally reviewed pertinent lab results    , CBC:   Lab Results   Component Value Date    WBC 7 24 09/21/2019    HGB 7 7 (L) 09/21/2019    HCT 26 7 (L) 09/21/2019    MCV 82 09/21/2019     (H) 09/21/2019    MCH 23 5 (L) 09/21/2019    MCHC 28 8 (L) 09/21/2019    RDW 16 9 (H) 09/21/2019    MPV 9 6 09/21/2019    NRBC 0 09/21/2019   , CMP:   Lab Results   Component Value Date    SODIUM 141 09/21/2019    K 3 6 09/21/2019     09/21/2019    CO2 28 09/21/2019    BUN 12 09/21/2019    CREATININE 0 56 (L) 09/21/2019    CALCIUM 8 8 09/21/2019    EGFR 114 09/21/2019     VTE Pharmacologic Prophylaxis: Enoxaparin (Lovenox)  VTE Mechanical Prophylaxis: sequential compression device

## 2019-09-22 LAB
ALBUMIN SERPL BCP-MCNC: 1.8 G/DL (ref 3.5–5)
ALP SERPL-CCNC: 59 U/L (ref 46–116)
ALT SERPL W P-5'-P-CCNC: 18 U/L (ref 12–78)
ANION GAP SERPL CALCULATED.3IONS-SCNC: 7 MMOL/L (ref 4–13)
AST SERPL W P-5'-P-CCNC: 18 U/L (ref 5–45)
BACTERIA BLD CULT: NORMAL
BACTERIA BLD CULT: NORMAL
BASOPHILS # BLD AUTO: 0.05 THOUSANDS/ΜL (ref 0–0.1)
BASOPHILS NFR BLD AUTO: 1 % (ref 0–1)
BILIRUB SERPL-MCNC: 0.17 MG/DL (ref 0.2–1)
BUN SERPL-MCNC: 14 MG/DL (ref 5–25)
CALCIUM SERPL-MCNC: 8.9 MG/DL (ref 8.3–10.1)
CHLORIDE SERPL-SCNC: 105 MMOL/L (ref 100–108)
CO2 SERPL-SCNC: 28 MMOL/L (ref 21–32)
CREAT SERPL-MCNC: 0.51 MG/DL (ref 0.6–1.3)
EOSINOPHIL # BLD AUTO: 0.38 THOUSAND/ΜL (ref 0–0.61)
EOSINOPHIL NFR BLD AUTO: 5 % (ref 0–6)
ERYTHROCYTE [DISTWIDTH] IN BLOOD BY AUTOMATED COUNT: 17 % (ref 11.6–15.1)
GFR SERPL CREATININE-BSD FRML MDRD: 118 ML/MIN/1.73SQ M
GLUCOSE SERPL-MCNC: 81 MG/DL (ref 65–140)
HCT VFR BLD AUTO: 27.9 % (ref 36.5–49.3)
HGB BLD-MCNC: 8 G/DL (ref 12–17)
IMM GRANULOCYTES # BLD AUTO: 0.04 THOUSAND/UL (ref 0–0.2)
IMM GRANULOCYTES NFR BLD AUTO: 1 % (ref 0–2)
LYMPHOCYTES # BLD AUTO: 1.76 THOUSANDS/ΜL (ref 0.6–4.47)
LYMPHOCYTES NFR BLD AUTO: 22 % (ref 14–44)
MCH RBC QN AUTO: 23.5 PG (ref 26.8–34.3)
MCHC RBC AUTO-ENTMCNC: 28.7 G/DL (ref 31.4–37.4)
MCV RBC AUTO: 82 FL (ref 82–98)
MONOCYTES # BLD AUTO: 0.44 THOUSAND/ΜL (ref 0.17–1.22)
MONOCYTES NFR BLD AUTO: 5 % (ref 4–12)
NEUTROPHILS # BLD AUTO: 5.44 THOUSANDS/ΜL (ref 1.85–7.62)
NEUTS SEG NFR BLD AUTO: 66 % (ref 43–75)
NRBC BLD AUTO-RTO: 0 /100 WBCS
PLATELET # BLD AUTO: 550 THOUSANDS/UL (ref 149–390)
PMV BLD AUTO: 9.3 FL (ref 8.9–12.7)
POTASSIUM SERPL-SCNC: 3.7 MMOL/L (ref 3.5–5.3)
PROT SERPL-MCNC: 8.2 G/DL (ref 6.4–8.2)
RBC # BLD AUTO: 3.4 MILLION/UL (ref 3.88–5.62)
SODIUM SERPL-SCNC: 140 MMOL/L (ref 136–145)
WBC # BLD AUTO: 8.11 THOUSAND/UL (ref 4.31–10.16)

## 2019-09-22 PROCEDURE — 99233 SBSQ HOSP IP/OBS HIGH 50: CPT | Performed by: INTERNAL MEDICINE

## 2019-09-22 PROCEDURE — 85025 COMPLETE CBC W/AUTO DIFF WBC: CPT | Performed by: INTERNAL MEDICINE

## 2019-09-22 PROCEDURE — 80053 COMPREHEN METABOLIC PANEL: CPT | Performed by: INTERNAL MEDICINE

## 2019-09-22 PROCEDURE — 99232 SBSQ HOSP IP/OBS MODERATE 35: CPT | Performed by: INTERNAL MEDICINE

## 2019-09-22 RX ADMIN — DOXYCYCLINE 100 MG: 100 CAPSULE ORAL at 21:51

## 2019-09-22 RX ADMIN — MEROPENEM 1000 MG: 1 INJECTION, POWDER, FOR SOLUTION INTRAVENOUS at 22:33

## 2019-09-22 RX ADMIN — OXYCODONE HYDROCHLORIDE 20 MG: 10 TABLET ORAL at 16:50

## 2019-09-22 RX ADMIN — MEROPENEM 1000 MG: 1 INJECTION, POWDER, FOR SOLUTION INTRAVENOUS at 16:51

## 2019-09-22 RX ADMIN — Medication 125 MG: at 21:52

## 2019-09-22 RX ADMIN — OXYCODONE HYDROCHLORIDE AND ACETAMINOPHEN 500 MG: 500 TABLET ORAL at 09:31

## 2019-09-22 RX ADMIN — DOXYCYCLINE 100 MG: 100 CAPSULE ORAL at 09:30

## 2019-09-22 RX ADMIN — OXYCODONE HYDROCHLORIDE 20 MG: 10 TABLET ORAL at 05:43

## 2019-09-22 RX ADMIN — Medication 250 MG: at 18:38

## 2019-09-22 RX ADMIN — MEROPENEM 1000 MG: 1 INJECTION, POWDER, FOR SOLUTION INTRAVENOUS at 06:44

## 2019-09-22 RX ADMIN — FOLIC ACID 500 MCG: 1 TABLET ORAL at 09:36

## 2019-09-22 RX ADMIN — Medication 250 MG: at 09:32

## 2019-09-22 RX ADMIN — Medication 125 MG: at 09:32

## 2019-09-22 RX ADMIN — ENOXAPARIN SODIUM 40 MG: 40 INJECTION SUBCUTANEOUS at 09:33

## 2019-09-22 RX ADMIN — ASPIRIN 81 MG: 81 TABLET, COATED ORAL at 09:30

## 2019-09-22 NOTE — PROGRESS NOTES
Progress Note - Infectious Disease   Mahnaz Valadez Moscat 63 y o  male MRN: [id number]  Unit/Bed#: E2 -01 Encounter: [id number]      Impression/Plan:  1  Sepsis, POA  Patient presented with fever along with tachycardia and leukocytosis  This was initially thought to be due to a complicated urinary tract infection however multifactorial given problems 2 and 3  Patient remains clinically stable  Multiple surgical evaluations as below  Continue antibiotics as below  Continue to trend fever curve/vitals   Repeat CBC/chemistry tomorrow  Ongoing follow-up by wound care, general surgery and Urology  Additional care as per primary     2  Perineal abscess communicating with sacral ulcer and likely involving penile prosthesis  Repeat imaging was indicative of abscess which seems to continue to spontaneously drain from the patient's sacral ulcer  He has been evaluated by 3 different surgical attendings and his wound bed is not felt to be acutely infected and so he is not recommended for surgical debridement  My suspicion remains high for involvement of the patient's penile prosthesis given problem 3  At this time will plan to treat this patient has a complicated wound infection given the ongoing and spontaneous drainage providing some level of source control  Current culture data reviewed  Continue meropenem with doxycycline  Continue to trend fever curve/vitals while admitted  Repeat labs tomorrow  Ongoing follow-up by wound care, General surgery and Urology  Will plan to treat for a total of 2 weeks of therapy through 10/1  Patient will require a PICC line  He will need close follow-up with both Urology and General surgery  Additional supportive care as per primary  Patient to confirm follow-up date and time with his urologist     3  Urethral erosion  Patient over the course of this admission was found to have urethral erosion his Solomon catheter was palpable through his sacral ulcer     He is now status post suprapubic catheter placement  Given this finding my suspicion again is for involvement of the patient's penile prosthesis given its proximity to this defect  Patient has now been evaluated by 2 separate urology attendings and no surgical interventions are recommended at this time and it is not felt that his prosthesis is involved in this process  Unfortunately if it is the patient is at very high risk of relapse and significant comorbidity  Detailed discussion with the patient below  Plan for antibiotics as above  Continue to trend fever curve/vitals  Repeat labs tomorrow  Ongoing follow-up and interventions as per Urology  Additional care as per primary      4  Positive blood culture--likely contaminant  Repeat cultures remain without growth  Antibiotics as above  Repeat cultures if patient spikes fever again  Additional care as per primary     5  Chronic sacral ulcer  Patient with chronic sacral decubitus along with osteomyelitis  As above has been evaluated by surgery as well as wound care  Would continue local wound care as per their recommendations  Additional care as per primary service  6  Scrotal excoriations  This is likely related to the above along with patient remaining bed-bound  Would continue local wound care as per wound care recommendation  Additional care otherwise as per primary      7  Paraplegia     8  History of C diff  Patient has reported history of C diff  He is not having significant change in his ostomy output at this time  Will maintain on C diff prophylaxis  Plan to continue for 72 hours after broad-spectrum antibiotic therapy  Additional care as per primary     9  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  Patient unfortunately has isolated multiple different organisms from his urine and he has now developed urethral erosion as above  Suspect that these organisms are likely involved in his perineal process    Antibiotics as above  Maintain contact isolation while admitted  Additional care as per primary      Detailed discussion about the above plan as documented below with the patient  ID consult service will continue to follow closely  Antibiotics:  Meropenem/doxy 2  Total antibiotics days directed towards wound 5    24 hour events:  No acute events noted overnight on chart review  Patient remains afebrile  He remains without leukocytosis  Other labs are unremarkable  No new culture data  No new imaging  Patient's other vitals are stable    Subjective:  Patient seen and examined at bedside with [address]   Patient denies having any nausea, vomiting, chest pain or shortness of breath  Does report some mild cramping sensation in his lower lower groin  Has no other new complaints at this time  I had a detailed discussion of the above plan with the patient using  services  We reviewed his clinical course  Reviewed his current antibiotics, side effect, risk of recurrent C diff  We also reviewed my reasoning for his current antibiotic duration my concern for his relapse if his prosthesis is involved  We also reviewed current surgical opinions  The patient's questions were answered as well  We also discussed that the mainstay of treatment if his prosthesis is in fact infected is not antibiotics alone and that he would require surgical intervention  We also discussed the drug resistant nature of the organisms he has present and limited antibiotic options  Lastly we discussed the need for PICC line home infusion of antibiotics  The patient does mention that he is plan to follow up with his regular urologist soon and that he can confirm the appointment for me  He is also willing to stay on antibiotics until he is seen by his own urologist   We will plan to discuss this option further tomorrow      Objective:  Vitals:  Temp:  [97 2 °F (36 2 °C)-98 9 °F (37 2 °C)] 97 2 °F (36 2 °C)  HR:  [] 108  Resp:  [18] 18  BP: (100-119)/(67-84) 100/67  SpO2:  [97 %-99 %] 97 %  Temp (24hrs), Av 2 °F (36 8 °C), Min:97 2 °F (36 2 °C), Max:98 9 °F (37 2 °C)  Current: Temperature: (!) 97 2 °F (36 2 °C)    Physical Exam:   General Appearance:  Alert, interactive, nontoxic, no acute distress  Throat: Oropharynx moist without lesions  Lungs:   Clear to auscultation bilaterally; no wheezes, rhonchi or rales; respirations unlabored on room air   Heart:  RRR; no murmur, rub or gallop   Abdomen:   Soft, non-tender, non-distended, positive bowel sounds  Suprapubic catheter without acute issue  Extremities: No clubbing, cyanosis or edema   Skin: No new rashes or lesions  No new draining wounds noted  No further purulent drainage noted around the patient's penis  The scrotum remains enlarged there is no obvious fluctuance and the excoriations persist   On examination of the patient's sacral wound the dressings are completely saturated and there is pus noted on the bandages along with foul smell  Labs, Imaging, & Other studies:   All pertinent labs and imaging studies were personally reviewed  Results from last 7 days   Lab Units 19  0554 19  0619 19  0551   WBC Thousand/uL 8 11 7 24 6 68   HEMOGLOBIN g/dL 8 0* 7 7* 7 6*   PLATELETS Thousands/uL 550* 550* 540*     Results from last 7 days   Lab Units 19  0554  19  0456   POTASSIUM mmol/L 3 7   < > 3 6   CHLORIDE mmol/L 105   < > 101   CO2 mmol/L 28   < > 28   BUN mg/dL 14   < > 7   CREATININE mg/dL 0 51*   < > 0 51*   EGFR ml/min/1 73sq m 118   < > 118   CALCIUM mg/dL 8 9   < > 9 0   AST U/L 18  --  21   ALT U/L 18  --  18   ALK PHOS U/L 59  --  70    < > = values in this interval not displayed  Results from last 7 days   Lab Units 19  0459 19  0349 19  1000   BLOOD CULTURE  No Growth After 5 Days  No Growth After 5 Days  No Growth After 5 Days

## 2019-09-22 NOTE — ASSESSMENT & PLAN NOTE
Baseline around 8 5-9  Hemoglobin today 8 0  No signs of active bleed  Continue to monitor, transfuse if less than 7  Continue ferrous sulfate and vitamin-C

## 2019-09-22 NOTE — ASSESSMENT & PLAN NOTE
Patient was admitted to fully  There was suspicion for urethral erosion    CT revealed no fistulous tract or active extravasation from the bladder  Suprapubic catheter placed 9/19/19

## 2019-09-22 NOTE — ASSESSMENT & PLAN NOTE
Urinary tract infection secondary to neurogenic bladder/urinary retention/self catheterizations/bladder outlet obstruction   Urine culture from 09/13/2019 growing E coli ESBL  Repeat urine culture from 09/14/2019 coagulase negative Staph  Evaluated by Infectious Disease, appreciate recommendations  On antibiotics

## 2019-09-22 NOTE — PROGRESS NOTES
Progress Note Art Bond 1960, 63 y o  male MRN: [id number]    Unit/Bed#: E2 -01 Encounter: [id number]    Primary Care Provider: Evie Arredondo MD   Date and time admitted to hospital: 9/16/2019 11:44 AM        Stage IV pressure ulcer of sacral region [facility])  Assessment & Plan  Stage 4 pressure ulcer on the left distal buttock  Evaluated by surgery, urology and ID  Per surgery, wound has no evidence of necrotic tissue or loculated collections, there was some drainage  Will most likely need plastic surgery for flap reconstruction  No urgent surgical intervention needed at this time  Suprapubic catheter in place  Wound was also examined by Urology  There is no connection from wounds to penile implant  Continue wound care  No indication for removal of prosthesis at present  Evaluated by ID  Suspicion remains high for penile implant involvement  Since there is no plan for any surgical intervention at this point, will treat it as complicated wound infection  Changed antibiotics to doxycycline and meropenem  for total of 2 weeks  Will need a PICC line  Frequent repositioning  Wound Care on board      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[facility])  Assessment & Plan  Patient has a nonfunctional penile prosthesis  Evaluated by Urology on the previous admission 08/23/2019, it was recommended for it to be removed, patient refused, wanted to do it as outpatient    At this point Urology does not think there is infection of the penile implant and no surgery planned at this point    * Complicated urinary tract infection  Assessment & Plan  Urinary tract infection secondary to neurogenic bladder/urinary retention/self catheterizations/bladder outlet obstruction   Urine culture from 09/13/2019 growing E coli ESBL  Repeat urine culture from 09/14/2019 coagulase negative Staph  Evaluated by Infectious Disease, appreciate recommendations  On antibiotics      Bacteremia  Assessment & Plan  One set of blood cultures taken 09/14/2019 growing coagulase-negative Staph, most likely contaminate  Repeated blood cultures 9/17 negative so far    Paraplegic spinal paralysis [facility])  Assessment & Plan  Secondary to spinal cyst  Frequent repositioning      History of Clostridium difficile colitis  Assessment & Plan  Patient with a history of C diff  Per ID continue oral vancomycin prophylaxis  Monitor ostomy output    Colostomy in place [facility])  Assessment & Plan  Secondary to chronic sacral ulcers    Neurogenic bladder  Assessment & Plan  Patient was admitted to [facility]  There was suspicion for urethral erosion  CT revealed no fistulous tract or active extravasation from the bladder  Suprapubic catheter placed 9/19/19    Stage II pressure ulcer of buttock  Assessment & Plan  Stage II pressure ulcer on the right distal buttock, present on admission  Frequent repositioning  Wound Care on board    Opioid use  Assessment & Plan  PDMP reviewed  Continue Oxy 20 mg q 8h p r n  Diabetes mellitus type II, controlled [facility])  Assessment & Plan  Lab Results   Component Value Date    HGBA1C 5 2 05/06/2019       No results for input(s): POCGLU in the last 72 hours  Blood Sugar Average: Last 72 hrs:   diet controlled  Last hemoglobin A1c 05/06/2019 was 5 2  Continue to monitor    Anemia  Assessment & Plan  Baseline around 8 5-9  Hemoglobin today 8 0  No signs of active bleed  Continue to monitor, transfuse if less than 7  Continue ferrous sulfate and vitamin-C        VTE Pharmacologic Prophylaxis:   Pharmacologic: Enoxaparin (Lovenox)  Mechanical VTE Prophylaxis in Place: No    Patient Centered Rounds: I have performed bedside rounds with nursing staff today  Discussions with Specialists or Other Care Team Provider: ID    Education and Discussions with Family / Patient: patient    Time Spent for Care: 30 minutes  More than 50% of total time spent on counseling and coordination of care as described above      Current Length of Stay: 6 day(s)    Current Patient Status: Inpatient   Certification Statement: The patient will continue to require additional inpatient hospital stay due to Further workup evaluation if he needs surgery    Discharge Plan: keep inpatient    Code Status: Level 1 - Full Code      Subjective:   Patient was seen and evaluated bedside this morning, he is feeling well, offers no complaints  Objective:     Vitals:   Temp (24hrs), Av 2 °F (36 8 °C), Min:97 2 °F (36 2 °C), Max:98 9 °F (37 2 °C)    Temp:  [97 2 °F (36 2 °C)-98 9 °F (37 2 °C)] 97 2 °F (36 2 °C)  HR:  [] 108  Resp:  [18] 18  BP: (100-119)/(67-84) 100/67  SpO2:  [97 %-99 %] 97 %  Body mass index is 23 58 kg/m²  Input and Output Summary (last 24 hours): Intake/Output Summary (Last 24 hours) at 2019 1427  Last data filed at 2019 0601  Gross per 24 hour   Intake    Output 1825 ml   Net -1825 ml       Physical Exam:     Physical Exam   Constitutional: He is oriented to person, place, and time  Since chronically ill, no acute distress   HENT:   Blind in the right eye   Eyes: EOM are normal    Neck: Normal range of motion  Cardiovascular: Normal rate, regular rhythm and normal heart sounds  Exam reveals no gallop and no friction rub  No murmur heard  Pulmonary/Chest: Effort normal and breath sounds normal  No respiratory distress  He has no wheezes  Abdominal: Soft  Bowel sounds are normal  He exhibits no distension  There is no tenderness  Suprapubic cath in place, colostomy in place patent   Musculoskeletal:   Right above-the-knee amputation   Neurological: He is alert and oriented to person, place, and time  No cranial nerve deficit  Skin:   Multiple old surgical scars in the sacrum and back  Right IJ   Psychiatric: He has a normal mood and affect         Additional Data:     Labs:    Results from last 7 days   Lab Units 19  0554   WBC Thousand/uL 8 11   HEMOGLOBIN g/dL 8 0*   HEMATOCRIT % 27 9*   PLATELETS Thousands/uL 550*   NEUTROS PCT % 66   LYMPHS PCT % 22   MONOS PCT % 5   EOS PCT % 5     Results from last 7 days   Lab Units 09/22/19  0554   SODIUM mmol/L 140   POTASSIUM mmol/L 3 7   CHLORIDE mmol/L 105   CO2 mmol/L 28   BUN mg/dL 14   CREATININE mg/dL 0 51*   ANION GAP mmol/L 7   CALCIUM mg/dL 8 9   ALBUMIN g/dL 1 8*   TOTAL BILIRUBIN mg/dL 0 17*   ALK PHOS U/L 59   ALT U/L 18   AST U/L 18   GLUCOSE RANDOM mg/dL 81                 Results from last 7 days   Lab Units 09/17/19  0456   PROCALCITONIN ng/ml 0 22           * I Have Reviewed All Lab Data Listed Above  * Additional Pertinent Lab Tests Reviewed: Olivia 67 Admission Reviewed    Imaging:    Imaging Reports Reviewed Today Include: all  Imaging Personally Reviewed by Myself Includes:      Recent Cultures (last 7 days):     Results from last 7 days   Lab Units 09/17/19  0459 09/17/19  0349 09/16/19  1000   BLOOD CULTURE  No Growth After 5 Days  No Growth After 5 Days  No Growth After 5 Days  Last 24 Hours Medication List:     Current Facility-Administered Medications:  acetaminophen 650 mg Oral Q6H PRN Odalis Walker PA-C    ascorbic acid 500 mg Oral Daily With Breakfast Axel Mayo MD    aspirin 81 mg Oral Daily Axel Mayo MD    doxycycline hyclate 100 mg Oral Q12H [address] Missy Rousseau MD    enoxaparin 40 mg Subcutaneous Daily Axel Mayo MD    folic acid 801 mcg Oral Daily Axel Mayo MD    iothalamate meglumine 15 mL Bladder Instillation Once in imaging Kady Giles PA-C    meropenem 1,000 mg Intravenous Q8H Missy Rousseau MD Last Rate: 1,000 mg (09/22/19 0644)   oxyCODONE 20 mg Oral Q8H PRN Axel Mayo MD    saccharomyces boulardii 250 mg Oral BID Axel Mayo MD    vancomycin 125 mg Oral Q12H Anselmo Brennan MD         Today, Patient Was Seen By: Axel Mayo MD    ** Please Note: Dictation voice to text software may have been used in the creation of this document   **

## 2019-09-22 NOTE — PLAN OF CARE
Problem: Potential for Falls  Goal: Patient will remain free of falls  Description  INTERVENTIONS:  - Assess patient frequently for physical needs  -  Identify cognitive and physical deficits and behaviors that affect risk of falls  -  [place] fall precautions as indicated by assessment   - Educate patient/family on patient safety including physical limitations  - Instruct patient to call for assistance with activity based on assessment  - Modify environment to reduce risk of injury  - Consider OT/PT consult to assist with strengthening/mobility  Outcome: Progressing     Problem: Prexisting or High Potential for Compromised Skin Integrity  Goal: Skin integrity is maintained or improved  Description  INTERVENTIONS:  - Identify patients at risk for skin breakdown  - Assess and monitor skin integrity  - Assess and monitor nutrition and hydration status  - Monitor labs   - Turn and reposition patient  - Assist with mobility/ambulation  - Relieve pressure over bony prominences  - Avoid friction and shearing  - Provide appropriate hygiene as needed including keeping skin clean and dry  - Evaluate need for skin moisturizer/barrier cream  - Collaborate with interdisciplinary team   - Patient/family teaching  - Consider wound care consult    Outcome: Progressing     Problem: Nutrition/Hydration-ADULT  Goal: Nutrient/Hydration intake appropriate for improving, restoring or maintaining nutritional needs  Description  Monitor and assess patient's nutrition/hydration status for malnutrition  Collaborate with interdisciplinary team and initiate plan and interventions as ordered  Monitor patient's weight and dietary intake as ordered or per policy  Utilize nutrition screening tool and intervene as necessary  Determine patient's food preferences and provide high-protein, high-caloric foods as appropriate       INTERVENTIONS:  - Monitor oral intake, urinary output, labs, and treatment plans  - Assess nutrition and hydration status and recommend course of action  - Evaluate amount of meals eaten  - Assist patient with eating if necessary   - Allow adequate time for meals  - Recommend/ encourage appropriate diets, oral nutritional supplements, and vitamin/mineral supplements  - Order, calculate, and assess calorie counts as needed  - Recommend, monitor, and adjust tube feedings and TPN/PPN based on assessed needs  - Assess need for intravenous fluids  - Provide specific nutrition/hydration education as appropriate  - Include patient/family/caregiver in decisions related to nutrition  Outcome: Progressing

## 2019-09-22 NOTE — ASSESSMENT & PLAN NOTE
Stage 4 pressure ulcer on the left distal buttock  Evaluated by surgery, urology and ID  Per surgery, wound has no evidence of necrotic tissue or loculated collections, there was some drainage  Will most likely need plastic surgery for flap reconstruction  No urgent surgical intervention needed at this time  Suprapubic catheter in place  Wound was also examined by Urology  There is no connection from wounds to penile implant  Continue wound care  No indication for removal of prosthesis at present  Evaluated by ID  Suspicion remains high for penile implant involvement  Since there is no plan for any surgical intervention at this point, will treat it as complicated wound infection  Changed antibiotics to doxycycline and meropenem  for total of 2 weeks    Will need a PICC line  Frequent repositioning  Wound Care on board

## 2019-09-23 ENCOUNTER — APPOINTMENT (INPATIENT)
Dept: RADIOLOGY | Facility: HOSPITAL | Age: 59
DRG: 871 | End: 2019-09-23
Payer: MEDICARE

## 2019-09-23 ENCOUNTER — APPOINTMENT (INPATIENT)
Dept: RADIOLOGY | Facility: HOSPITAL | Age: 59
DRG: 871 | End: 2019-09-23
Attending: INTERNAL MEDICINE
Payer: MEDICARE

## 2019-09-23 DIAGNOSIS — L89.323: Primary | ICD-10-CM

## 2019-09-23 LAB
ANION GAP SERPL CALCULATED.3IONS-SCNC: 7 MMOL/L (ref 4–13)
BASOPHILS # BLD AUTO: 0.09 THOUSANDS/ΜL (ref 0–0.1)
BASOPHILS NFR BLD AUTO: 1 % (ref 0–1)
BUN SERPL-MCNC: 17 MG/DL (ref 5–25)
CALCIUM SERPL-MCNC: 9.2 MG/DL (ref 8.3–10.1)
CHLORIDE SERPL-SCNC: 103 MMOL/L (ref 100–108)
CO2 SERPL-SCNC: 28 MMOL/L (ref 21–32)
CREAT SERPL-MCNC: 0.54 MG/DL (ref 0.6–1.3)
EOSINOPHIL # BLD AUTO: 0.37 THOUSAND/ΜL (ref 0–0.61)
EOSINOPHIL NFR BLD AUTO: 5 % (ref 0–6)
ERYTHROCYTE [DISTWIDTH] IN BLOOD BY AUTOMATED COUNT: 17.2 % (ref 11.6–15.1)
GFR SERPL CREATININE-BSD FRML MDRD: 116 ML/MIN/1.73SQ M
GLUCOSE SERPL-MCNC: 79 MG/DL (ref 65–140)
HCT VFR BLD AUTO: 29.4 % (ref 36.5–49.3)
HGB BLD-MCNC: 8.6 G/DL (ref 12–17)
IMM GRANULOCYTES # BLD AUTO: 0.03 THOUSAND/UL (ref 0–0.2)
IMM GRANULOCYTES NFR BLD AUTO: 0 % (ref 0–2)
LYMPHOCYTES # BLD AUTO: 2.11 THOUSANDS/ΜL (ref 0.6–4.47)
LYMPHOCYTES NFR BLD AUTO: 26 % (ref 14–44)
MAGNESIUM SERPL-MCNC: 2 MG/DL (ref 1.6–2.6)
MCH RBC QN AUTO: 23.9 PG (ref 26.8–34.3)
MCHC RBC AUTO-ENTMCNC: 29.3 G/DL (ref 31.4–37.4)
MCV RBC AUTO: 82 FL (ref 82–98)
MONOCYTES # BLD AUTO: 0.42 THOUSAND/ΜL (ref 0.17–1.22)
MONOCYTES NFR BLD AUTO: 5 % (ref 4–12)
NEUTROPHILS # BLD AUTO: 5.17 THOUSANDS/ΜL (ref 1.85–7.62)
NEUTS SEG NFR BLD AUTO: 63 % (ref 43–75)
NRBC BLD AUTO-RTO: 0 /100 WBCS
PLATELET # BLD AUTO: 570 THOUSANDS/UL (ref 149–390)
PMV BLD AUTO: 8.9 FL (ref 8.9–12.7)
POTASSIUM SERPL-SCNC: 3.7 MMOL/L (ref 3.5–5.3)
RBC # BLD AUTO: 3.6 MILLION/UL (ref 3.88–5.62)
SODIUM SERPL-SCNC: 138 MMOL/L (ref 136–145)
WBC # BLD AUTO: 8.19 THOUSAND/UL (ref 4.31–10.16)

## 2019-09-23 PROCEDURE — 85025 COMPLETE CBC W/AUTO DIFF WBC: CPT | Performed by: INTERNAL MEDICINE

## 2019-09-23 PROCEDURE — 36569 INSJ PICC 5 YR+ W/O IMAGING: CPT

## 2019-09-23 PROCEDURE — 80048 BASIC METABOLIC PNL TOTAL CA: CPT | Performed by: INTERNAL MEDICINE

## 2019-09-23 PROCEDURE — 05HM33Z INSERTION OF INFUSION DEVICE INTO RIGHT INTERNAL JUGULAR VEIN, PERCUTANEOUS APPROACH: ICD-10-PCS | Performed by: INTERNAL MEDICINE

## 2019-09-23 PROCEDURE — C1751 CATH, INF, PER/CENT/MIDLINE: HCPCS

## 2019-09-23 PROCEDURE — 36597 REPOSITION VENOUS CATHETER: CPT

## 2019-09-23 PROCEDURE — 99232 SBSQ HOSP IP/OBS MODERATE 35: CPT | Performed by: PHYSICIAN ASSISTANT

## 2019-09-23 PROCEDURE — 99233 SBSQ HOSP IP/OBS HIGH 50: CPT | Performed by: INTERNAL MEDICINE

## 2019-09-23 PROCEDURE — 83735 ASSAY OF MAGNESIUM: CPT | Performed by: INTERNAL MEDICINE

## 2019-09-23 PROCEDURE — 71045 X-RAY EXAM CHEST 1 VIEW: CPT

## 2019-09-23 PROCEDURE — 99232 SBSQ HOSP IP/OBS MODERATE 35: CPT | Performed by: INTERNAL MEDICINE

## 2019-09-23 PROCEDURE — NC001 PR NO CHARGE: Performed by: RADIOLOGY

## 2019-09-23 RX ADMIN — MEROPENEM 1000 MG: 1 INJECTION, POWDER, FOR SOLUTION INTRAVENOUS at 06:26

## 2019-09-23 RX ADMIN — ASPIRIN 81 MG: 81 TABLET, COATED ORAL at 08:26

## 2019-09-23 RX ADMIN — OXYCODONE HYDROCHLORIDE 20 MG: 10 TABLET ORAL at 08:34

## 2019-09-23 RX ADMIN — ENOXAPARIN SODIUM 40 MG: 40 INJECTION SUBCUTANEOUS at 08:26

## 2019-09-23 RX ADMIN — Medication 250 MG: at 18:16

## 2019-09-23 RX ADMIN — FOLIC ACID 500 MCG: 1 TABLET ORAL at 08:25

## 2019-09-23 RX ADMIN — DOXYCYCLINE 100 MG: 100 CAPSULE ORAL at 21:59

## 2019-09-23 RX ADMIN — OXYCODONE HYDROCHLORIDE AND ACETAMINOPHEN 500 MG: 500 TABLET ORAL at 08:26

## 2019-09-23 RX ADMIN — DOXYCYCLINE 100 MG: 100 CAPSULE ORAL at 08:25

## 2019-09-23 RX ADMIN — Medication 250 MG: at 08:25

## 2019-09-23 RX ADMIN — MEROPENEM 1000 MG: 1 INJECTION, POWDER, FOR SOLUTION INTRAVENOUS at 23:48

## 2019-09-23 RX ADMIN — OXYCODONE HYDROCHLORIDE 20 MG: 10 TABLET ORAL at 00:53

## 2019-09-23 RX ADMIN — Medication 125 MG: at 21:59

## 2019-09-23 RX ADMIN — OXYCODONE HYDROCHLORIDE 20 MG: 10 TABLET ORAL at 16:40

## 2019-09-23 RX ADMIN — MEROPENEM 1000 MG: 1 INJECTION, POWDER, FOR SOLUTION INTRAVENOUS at 16:41

## 2019-09-23 RX ADMIN — Medication 125 MG: at 08:26

## 2019-09-23 NOTE — PROGRESS NOTES
Progress Note - Urology  Whitney Givens 1960, 63 y o  male MRN: [id number]    Unit/Bed#: E2 -01 Encounter: [id number]    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[facility])  Assessment & Plan  Eventually this prosthesis will need to be removed  However at this point, on imaging and exam it appears not to be grossly infected but rather to be communication from the urinary tract from obliterated urethra with loss of domain communicating with this decubitus ulcer  For now, will divert urine via suprapubic catheter and follow clinical course  Plan for outpatient follow-up (scheduled 10/8)  with Dr Eulogio Velásquez for discussion of explant at that time  No inpatient surgical plans at this time  * Complicated urinary tract infection  Assessment & Plan  Neurogenic bladder with bladder outlet obstruction, prior CIC at home, s/p Solomon catheter insertion now with discontinuous urethra, now s/p SPT insertion on 9/20/19  Postoperative day 3 status post IR placement of suprapubic catheter, [address] [place]  Draining well with clear yellow urine overnight  Current coverage with Zosyn and vancomycin per Infectious Disease with oral vancomycin for C diff prophylaxis  Bedside rounds performed with RN  Discussed with Dr Nicky Juan    Urology will sign off, but remain available for further inpatient questions  Please do not hesitate to contact us with questions or concerns  Subjective:   HPI:  Feeling well today  No fevers or chills  No issues with the suprapubic tube  He does not mind this  He is anticipating an office visit with us on October 8th  There are plans for IV antibiotics administered via PICC line roughly through that time  Review of Systems   Constitutional: Negative  Negative for chills and fever  Respiratory: Negative  Negative for shortness of breath  Cardiovascular: Negative  Negative for chest pain  Genitourinary: Positive for difficulty urinating (NGB)   Negative for decreased urine volume, dysuria, flank pain, frequency, hematuria and urgency  Musculoskeletal: Positive for gait problem (right AKA)  Skin: Positive for wound (sacral decubitus)  Objective:  Nursing Rounds: afebrile, no issues with SPT  Vitals: Blood pressure 105/65, pulse (!) 109, temperature 98 6 °F (37 °C), temperature source Temporal, resp  rate 18, height 5' 7" (1 702 m), weight 68 3 kg (150 lb 9 2 oz), SpO2 97 %  ,Body mass index is 23 58 kg/m²  Intake/Output Summary (Last 24 hours) at 9/23/2019 1205  Last data filed at 9/23/2019 1031  Gross per 24 hour   Intake    Output 3275 ml   Net -3275 ml     Invasive Devices     Central Venous Catheter Line            CVC Central Lines 09/14/19 Triple Right Subclavian 8 days          Drain            Colostomy Descending/sigmoid LLQ -- days    Suprapubic Catheter Non-latex 16 Fr  3 days                Physical Exam   Constitutional: He is oriented to person, place, and time  He appears well-developed and well-nourished  HENT:   Head: Normocephalic and atraumatic  Cardiovascular: Normal rate, regular rhythm and normal heart sounds  No murmur heard  Pulmonary/Chest: Effort normal and breath sounds normal    Abdominal: Soft  Bowel sounds are normal    llq ostomy with yellow brown stool  Suprapubic cystostomy catheter draining clear yellow urine   Genitourinary:   Genitourinary Comments: Uncircumcised penis easily retracted foreskin, ventral urethral erosion proximal shaft; scrotal skin firm, dry, without erythema or crepitus;  exam without focal tenderness   Musculoskeletal: Normal range of motion  He exhibits no edema  Neurological: He is alert and oriented to person, place, and time  Skin: Skin is warm and dry  Capillary refill takes less than 2 seconds  No pallor  Nursing note and vitals reviewed        History:    Past Medical History:   Diagnosis Date    Anemia     Blind     r eye    Chronic cystitis     Colostomy in place [facility])     Detrusor sphincter dyssynergia     Diabetes mellitus ([facility] [address] )     Poorly controlled type 2; Last Assessed:  3/18/14    Erectile dysfunction     Frequency of urination     GERD (gastroesophageal reflux disease)     History of diabetes mellitus     History of osteomyelitis     Hx of leg amputation (HCC)     r high upper leg    Hyperlipidemia     Hypertension     Hypospadias     Incomplete bladder emptying     Neurogenic bladder     Paralysis (HCC)     Paraplegia (HCC)     Spinal cord cysts     Ulcer of sacral region [facility])     Urge incontinence      Past Surgical History:   Procedure Laterality Date    AMPUTATION      At hip; Last Assessed:  1/19/16    BLADDER SURGERY      COLON SURGERY      llq ostomy pouch    COLOSTOMY      COMPLEX CYSTOMETROGRAM  2014    CT CYSTOGRAM  9/19/2019    CYSTOSCOPY  2014    IR SUPRAPUBIC TUBE  9/19/2019    LEG AMPUTATION      MEATOTOMY      PENILE PROSTHESIS IMPLANT  2011    WY ADJ TISS XFER SCALP,EXTREM 10 1-30 SQCM Left 5/1/2017    Procedure: POSTERIOR THIGH V-Y ADMANCEMENT;  Surgeon: Minoo Marcelino MD;  [facility];  Service: Plastics    WY MUSCLE-SKIN FLAP,TRUNK Left 5/1/2017    Procedure: FLAP CLOSURE LEFT ISCHIAL WOUND and "RIGHT" ISCHIAL FLAP ADVANCEMENT * DEBR[place]EMENT, [place] ;  Surgeon: Minoo Marcelino MD;  [facility];  Service: Plastics    WY MUSCLE-SKIN FLAP,TRUNK Left 9/27/2017    Procedure: gluteal myocutaneous rotational flap, posterior thigh v to y advancement- wound 5 x 2 5 x 8;  Surgeon: Minoo Marcelino MD;  [facility];  Service: Plastics    SPINE SURGERY      Lower back    UROFLOWMETRY SIMPLE / COMPLEX  2014     Family History   Problem Relation Age of Onset    No Known Problems Mother     No Known Problems Father      Social History     Socioeconomic History    Marital status: /Civil Union     Spouse name: Not on file    Number of children: Not on file    Years of education: Not on file    Highest education level: Not on file   Occupational History    Not on file   Social Needs    Financial resource strain: Not on file    Food insecurity:     Worry: Not on file     Inability: Not on file    Transportation needs:     Medical: Not on file     Non-medical: Not on file   Tobacco Use    Smoking status: Former Smoker     Packs/day: 0 50     Years: 10 00     Pack years: 5 00     Last attempt to quit:      Years since quittin 7    Smokeless tobacco: Never Used    Tobacco comment: Onset date:  11/10/17   Substance and Sexual Activity    Alcohol use: Never     Frequency: Never     Comment: Per [employer]:  Social drinker (Onset date:  11/10/17)    Drug use: No    Sexual activity: Not on file   Lifestyle    Physical activity:     Days per week: Not on file     Minutes per session: Not on file    Stress: Not on file   Relationships    Social connections:     Talks on phone: Not on file     Gets together: Not on file     Attends [employer] service: Not on file     Active member of club or organization: Not on file     Attends meetings of clubs or organizations: Not on file     Relationship status: Not on file    Intimate partner violence:     Fear of current or ex partner: Not on file     Emotionally abused: Not on file     Physically abused: Not on file     Forced sexual activity: Not on file   Other Topics Concern    Not on file   Social History Narrative    [facility] language [address] [place] [address] Dr Ontiveros    Social history reviewed, unchanged       Labs:  Recent Labs     19  0619 19  0554 19  0638   WBC 7 24 8 11 8 19       Recent Labs     19  0619 19  0554 19  0638   HGB 7 7* 8 0* 8 6*     Recent Labs     19  0619 19  0554 19  0638   HCT 26 7* 27 9* 29 4*     Recent Labs     19  0619 19  0554 19  0051   CREATININE 0 56* 0 51* 0 54*     Urine colonization Pseudomonas, ESBL E coli, MRSA  Blood culture one of to Staph coag-negative, repeat x3 cultures no growth at five lydia Land PA-C  Date: 9/23/2019 Time: 12:05 PM

## 2019-09-23 NOTE — PROGRESS NOTES
[facility] Internal Medicine Progress Note  Patient: Abimbola Vasquez 63 y o  male   MRN: [id number]  PCP: Kyler Desai MD  Unit/Bed#: E2 [place]01 Encounter: [id number]  Date Of Visit: 09/23/19      Assessment/plan  1  Stage IV pressure ulcer of the sacral region- evaluated by surgery, urology and ID due to perineal abscess that is communicating with sacral ulcer and likely penile prosthesis involvement- per surgery wound has no evidence of necrotic tissue or loculate collections  It did spontaneously drain  Pt will likely need plastic surgery for flap reconstruction but no urgent surgical intervention at this time  Urology stated that there was no connection from wound to penile implant  No indication for removal of prosthesis  However ID is highly suspicious for penile implant involvement  Pt will be treated as if this is a complicated wound infection  Antibiotics will consist of doxycycline and meropenem for total of 2 weeks (through 10/1/19)  Picc will be placed  2  Sepsis due to perineal abscess0 please see above    3  Colonization with esbl e coli, mrsa, and pseudomonas of bladder- antibiotics due to number 1    4  H/o c diff- continue c diff prophylaxis for 72 hours after broad spectrum antibiotic therapy    5  Positive blood culture- likely contaminant  Repeat blood cultures without growth  6  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      7  Neurogenic bladder- s/p suprapubic catheter    8  Acute/chronic normocytic anemia- likely due to chronic disease  Continue current treatment  H/h is stable  Subjective:   Pt seen and examined  Pt denies any problems today  Pain is controlled  No f/c no cp no sob no n/v/d no abd pain  Pt signed consent for picc yesterday  He is still agreeable to this  Objective:     Vitals: Blood pressure 105/65, pulse (!) 109, temperature 98 6 °F (37 °C), temperature source Temporal, resp   rate 18, height 5' 7" (1 702 m), weight 68 3 kg (150 lb 9 2 oz), SpO2 97 % ,Body mass index is 23 58 kg/m²  Lab, Imaging and other studies:  Results from last 7 days   Lab Units 09/23/19  0638   WBC Thousand/uL 8 19   HEMOGLOBIN g/dL 8 6*   HEMATOCRIT % 29 4*   PLATELETS Thousands/uL 570*     Results from last 7 days   Lab Units 09/23/19  0638 09/22/19  0554   POTASSIUM mmol/L 3 7 3 7   CHLORIDE mmol/L 103 105   CO2 mmol/L 28 28   BUN mg/dL 17 14   CREATININE mg/dL 0 54* 0 51*   CALCIUM mg/dL 9 2 8 9   ALK PHOS U/L  --  59   ALT U/L  --  18   AST U/L  --  18         Lab Results   Component Value Date    BLOODCX No Growth After 5 Days  09/17/2019    BLOODCX No Growth After 5 Days  09/17/2019    BLOODCX No Growth After 5 Days   09/16/2019    URINECX 20,000-29,000 cfu/ml Pseudomonas aeruginosa (A) 09/14/2019    URINECX 10,000-19,000 cfu/ml Escherichia coli ESBL (A) 09/14/2019    URINECX (A) 09/14/2019     <10,000 cfu/ml Methicillin Resistant Staphylococcus aureus    URINECX 10,000-19,000 cfu/ml Enterococcus faecalis (A) 09/14/2019    URINECX (A) 09/14/2019     40,000-49,000 cfu/ml Alpha Hemolytic Streptococcus NOT Enterococcus    WOUNDCULT (A) 05/09/2018     1+ Growth of Methicillin Resistant Staphylococcus aureus    WOUNDCULT 1+ Growth of Beta Hemolytic Streptococcus Group B (A) 05/09/2018    WOUNDCULT 1+ Growth of  05/09/2018     Scheduled Meds:   Current Facility-Administered Medications:  acetaminophen 650 mg Oral Q6H PRN Odalis Walker PA-C    ascorbic acid 500 mg Oral Daily With Breakfast Flakita Marrufo MD    aspirin 81 mg Oral Daily Flakita Marrufo MD    doxycycline hyclate 100 mg Oral Q12H [address] Trang Cotton MD    enoxaparin 40 mg Subcutaneous Daily Flakita Marrufo MD    folic acid 508 mcg Oral Daily Flakita Marrufo MD    iothalamate meglumine 15 mL Bladder Instillation Once in imaging Renetta Gates PA-C    meropenem 1,000 mg Intravenous Q8H Trang Cotton MD Last Rate: 1,000 mg (09/23/19 0626)   oxyCODONE 20 mg Oral Q8H PRN Flakita Marrufo MD    saccharomyces boulardii 250 mg Oral BID Luis Allan MD    vancomycin 125 mg Oral Q12H [facility] Luis Allan MD      Continuous Infusions:    PRN Meds:   acetaminophen    iothalamate meglumine    oxyCODONE      Physical exam:  Physical Exam  General appearance: alert and oriented, in no acute distress  Head: Normocephalic, without obvious abnormality, atraumatic  Eyes: conjunctivae/corneas clear  PERRL, EOM's intact  Fundi benign    Neck: no adenopathy, no carotid bruit, no JVD, supple, symmetrical, trachea midline and thyroid not enlarged, symmetric, no tenderness/mass/nodules  Lungs: clear to auscultation bilaterally  Heart: regular rate and rhythm, S1, S2 normal, no murmur, click, rub or gallop  Abdomen: soft, non-tender; bowel sounds normal; no masses,  no organomegaly  Extremities: extremities normal, warm and well-perfused; no cyanosis, clubbing, or edema  Neurologic: Mental status: Alert, oriented, thought content appropriate      VTE Pharmacologic Prophylaxis: Enoxaparin (Lovenox)  VTE Mechanical Prophylaxis: sequential compression device    Counseling / Coordination of Care  Total floor / unit time spent today 20 minutes    Current Length of Stay: 7 day(s)    Current Patient Status: Inpatient       Code Status: Level 1 - Full Code

## 2019-09-23 NOTE — PLAN OF CARE
Problem: Potential for Falls  Goal: Patient will remain free of falls  Description  INTERVENTIONS:  - Assess patient frequently for physical needs  -  Identify cognitive and physical deficits and behaviors that affect risk of falls  -  [place] fall precautions as indicated by assessment   - Educate patient/family on patient safety including physical limitations  - Instruct patient to call for assistance with activity based on assessment  - Modify environment to reduce risk of injury  - Consider OT/PT consult to assist with strengthening/mobility  Outcome: Progressing     Problem: Prexisting or High Potential for Compromised Skin Integrity  Goal: Skin integrity is maintained or improved  Description  INTERVENTIONS:  - Identify patients at risk for skin breakdown  - Assess and monitor skin integrity  - Assess and monitor nutrition and hydration status  - Monitor labs   - Turn and reposition patient  - Assist with mobility/ambulation  - Relieve pressure over bony prominences  - Avoid friction and shearing  - Provide appropriate hygiene as needed including keeping skin clean and dry  - Evaluate need for skin moisturizer/barrier cream  - Collaborate with interdisciplinary team   - Patient/family teaching  - Consider wound care consult    Outcome: Progressing     Problem: Nutrition/Hydration-ADULT  Goal: Nutrient/Hydration intake appropriate for improving, restoring or maintaining nutritional needs  Description  Monitor and assess patient's nutrition/hydration status for malnutrition  Collaborate with interdisciplinary team and initiate plan and interventions as ordered  Monitor patient's weight and dietary intake as ordered or per policy  Utilize nutrition screening tool and intervene as necessary  Determine patient's food preferences and provide high-protein, high-caloric foods as appropriate       INTERVENTIONS:  - Monitor oral intake, urinary output, labs, and treatment plans  - Assess nutrition and hydration status and recommend course of action  - Evaluate amount of meals eaten  - Assist patient with eating if necessary   - Allow adequate time for meals  - Recommend/ encourage appropriate diets, oral nutritional supplements, and vitamin/mineral supplements  - Order, calculate, and assess calorie counts as needed  - Recommend, monitor, and adjust tube feedings and TPN/PPN based on assessed needs  - Assess need for intravenous fluids  - Provide specific nutrition/hydration education as appropriate  - Include patient/family/caregiver in decisions related to nutrition  Outcome: Progressing

## 2019-09-23 NOTE — PROCEDURES
Insert PICC line  Date/Time: 9/23/2019 12:37 PM  Performed by: Ange Slater RN  Authorized by: Ernesto Henning DO     Patient location:  Bedside  Consent:     Consent obtained:  Written    Consent given by:  Patient    Procedural risks discussed: consent obtained by physician  [place] protocol:     Procedure explained and questions answered to patient or proxy's satisfaction: yes      Relevant documents present and verified: yes      Test results available and properly labeled: yes      Radiology Images displayed and confirmed  If images not available, report reviewed: yes      Required blood products, implants, devices, and special equipment available: yes      Site/side marked: yes      Immediately prior to procedure, a time out was called: yes      Patient identity confirmed:  Verbally with patient, arm band, provided demographic data and hospital-assigned identification number  Pre-procedure details:     Hand hygiene: Hand hygiene performed prior to insertion      Sterile barrier technique: All elements of maximal sterile technique followed      Skin preparation:  ChloraPrep    Skin preparation agent: Skin preparation agent completely dried prior to procedure    Indications:     PICC line indications: long term antibiotics    Anesthesia (see MAR for exact dosages):      Anesthesia method:  Local infiltration (3ml)    Local anesthetic:  Lidocaine 1% w/o epi  Procedure details:     Location:  [place]    Vessel type: vein      Laterality:  Left    Site selection rationale:  LROM RUE    Approach: percutaneous technique used      Patient position:  Flat    Procedural supplies:  Double lumen    Catheter size:  5 Fr    Landmarks identified: yes      Ultrasound guidance: yes      Sterile ultrasound techniques: Sterile gel and sterile probe covers were used      Number of attempts:  1    Successful placement: yes      Vessel of catheter tip end:  Chest Xray needed to confirm placement    Total catheter length (cm):  44    Catheter out on skin (cm):  0    Max flow rate:  999ml/hr    Arm circumference:  35cm  Post-procedure details:     Post-procedure:  Dressing applied and securement device placed    Assessment:  Blood return through all ports, free fluid flow and placement verification pending x-ray result    Post-procedure complications: none      Patient tolerance of procedure:   Tolerated well, no immediate complications  Comments:      [place] wire used for placement  BC negative 5 days, ID okay for picc placement  Lot#[id number] 2020-07-31  cxr picc coiled, Dr Kimmy Doe notified and IR consult ordered for repositioning, IR RN Fadi Lara notified, Primary Rn notified of above and pink stickers placed on leonard ports

## 2019-09-23 NOTE — PROGRESS NOTES
Progress Note - Infectious Disease   Yajaira Tello Moscat 63 y o  male MRN: [id number]  Unit/Bed#: E2 -01 Encounter: [id number]      Impression/Plan:  1  Sepsis  POA   Fever, tachycardia and leukocytosis   This was initially thought to be due to a complicated urinary tract infection however this is likely multifactorial given problems 2 and 3  Initial blood cultures were likely contaminated with coag-negative Staph coccus  All repeat blood cultures were negative  Fortunately the patient remains clinically stable  Today he is afebrile with a normal WBC count   -antibiotics as below  -monitor CBC and BMP  -monitor vitals  -supportive care     2  Perineal abscess communicating with sacral ulcer  Likely involving penile prosthesis   Repeat imaging was indicative of abscess which seems to continue to spontaneously drain from the patient's sacral ulcer  Alexei Lowry has been evaluated by 3 different surgical attendings and his wound bed is not felt to be acutely infected and therefore  he is not recommended for surgical debridement  I continue to suspect involvement of the patient's penile prosthesis given problem 3  At this time we will plan to treat this patient as a complicated wound infection given the ongoing and spontaneous drainage to provide some level of source control  He is receiving meropenem and doxycycline and is tolerating without difficulty  -continue IV meropenem through 10/01/2019 for a total of 14 days of antibiotic treatment  -continue p o   Doxycycline through 10/01/2019 for total 14 days of antibiotic treatment  -monitor CBC and BMP  -monitor vitals  -serial wound exam  -continue close follow-up with wound management  -continue close follow-up with Urology  -continue close follow-up with General surgery  -recommend outpatient follow-up with Urology and General surgery shortly after discharge  -okay to place PICC line  -patient should complete weekly CBCD and BMP while on IV antibiotics  -PICC line needs to be removed after final dose of IV antibiotics on 10/01/2019     3  Urethral erosion   Patient over the course of this admission was found to have urethral erosion  His Solomon catheter was palpable through his sacral ulcer    He is now status post suprapubic catheter placement  Given this finding I suspect involvement of the patient's penile prosthesis given its proximity to this defect  Patient has now been evaluated by 2 separate urology attendings and no surgical interventions are recommended at this time  They do not feel that his prosthesis is involved in this process   Unfortunately, if it is, the patient is at very high risk of relapse and significant comorbidity   -antibiotics as above  -monitor CBC and BMP  -monitor vitals  -continue follow-up with Urology     4  Coag-negative Staphylococcus bacteremia  Showing in one set of his initial blood cultures  I suspect this is representative of skin contamination and not in active bacteremia   Patient is clinically stable without fever or leukocytosis   Repeat blood cultures are negative after five days  -no indication for antibiotics for this issue  -monitor CBC and BMP  -monitor vitals     5  Chronic sacral ulcer   Patient with chronic sacral decubitus along with osteomyelitis   As above has been evaluated by surgery as well as wound care   Would continue local wound care as per their recommendations  Additional care as per primary service      6  Scrotal excoriations   This is likely related to the above along with patient remaining bed-bound   Would continue local wound care as per wound care recommendation  Additional care otherwise as per primary      7  Paraplegia     8  History of C diff   He has been receiving PO vancomycin prophylaxis and has been tolerating without difficulty   He is not having significant change in his ostomy output at this time    No abdominal complaints   -continue PO Vancomycin prophylaxis through 10/04/2019 which 72 hours after completion of antibiotic treatment above  -monitor stool output  -monitor abdominal symptoms     9  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   Patient unfortunately has isolated multiple different organisms from his urine and he has now developed urethral erosion as above   Suspect that these organisms are likely involved in his perineal process   -antibiotics as above    Above plan was discussed in detail with the patient at the bedside  Antibiotics:  Meropenem 3  Doxycycline 3  PO vancomycin 8  Antibiotics 10    Subjective:  Patient has no new complaints today  States he is unable to feel the wounds/scrotal excoriations  He tells me that the last time his dressing changed was yesterday  He has no complaints about his suprapubic tube today  He denies fever, chills, sweats, shakes; no nausea, vomiting, abdominal pain; no concerns with his ostomy pouch; no cough, shortness of breath, or chest pain  No new symptoms  Objective:  Vitals:  Temp:  [97 8 °F (36 6 °C)-99 °F (37 2 °C)] 98 6 °F (37 °C)  HR:  [] 109  Resp:  [18] 18  BP: (105-114)/(65-79) 105/65  SpO2:  [96 %-97 %] 97 %  Temp (24hrs), Av 5 °F (36 9 °C), Min:97 8 °F (36 6 °C), Max:99 °F (37 2 °C)  Current: Temperature: 98 6 °F (37 °C)    Physical Exam:   General Appearance:  Alert, interactive, nontoxic, no acute distress  Patient appears chronically ill and debilitated  Throat: Oropharynx moist without lesions  Lungs:   Clear to auscultation bilaterally; no wheezes, rhonchi or rales; respirations unlabored   Heart:  RRR; no murmur, rub or gallop   Abdomen:   Soft, non-tender, non-distended, positive bowel sounds  Ostomy pouch is intact, stool output is than brown  Suprapubic tube intact, no spreading erythema or leakage at site   Extremities: No clubbing or cyanosis, no LLE edema; R stump healed  Skin: No new rashes, lesions, or draining wounds noted on exposed skin    Extensive scrotal excoriations but no active bleeding or drainage; Allyven foam clean and intact over upper sacrum/buttocks, did not remove; sacral wound packing was moist and intact, I did not remove to examine further     Labs, Imaging, & Other studies:   All pertinent labs and imaging studies were personally reviewed  Results from last 7 days   Lab Units 09/23/19  0638 09/22/19  0554 09/21/19  0619   WBC Thousand/uL 8 19 8 11 7 24   HEMOGLOBIN g/dL 8 6* 8 0* 7 7*   PLATELETS Thousands/uL 570* 550* 550*     Results from last 7 days   Lab Units 09/23/19  0638 09/22/19  0554  09/17/19  0456   POTASSIUM mmol/L 3 7 3 7   < > 3 6   CHLORIDE mmol/L 103 105   < > 101   CO2 mmol/L 28 28   < > 28   BUN mg/dL 17 14   < > 7   CREATININE mg/dL 0 54* 0 51*   < > 0 51*   EGFR ml/min/1 73sq m 116 118   < > 118   CALCIUM mg/dL 9 2 8 9   < > 9 0   AST U/L  --  18  --  21   ALT U/L  --  18  --  18   ALK PHOS U/L  --  59  --  70    < > = values in this interval not displayed  Results from last 7 days   Lab Units 09/17/19  0459 09/17/19  0349   BLOOD CULTURE  No Growth After 5 Days  No Growth After 5 Days

## 2019-09-23 NOTE — SOCIAL WORK
CM met with pt at bedside to discuss discharge needs  Pt lives in an apartment with his spouse  ADL's are completed independently  Pt uses an electric w/c to get around  Pt has hx of leg amputation, paralysis and paraplegia  Pt also owns a hospital bed and a shower chair  PCP identified as Dr Kyler Desai  Pt uses [facility] for Rx needs  Pt was recently discharged with [facility] for SN  Pt also has a [facility] 16 hours a day to assist with ADL's  CM discussed IV abx with pt who would like to go home  Pt reports that he feels comfortable administering them himself  HHA will likely be able to learn and assist as well  CM will await for a script to run through insurance  CM advised pt if there is a high co-pay we can look into a SNF  Pt verbalized understanding; CM following for discharge needs

## 2019-09-23 NOTE — WOUND OSTOMY CARE
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 which has been removed  Scar tissue to midline abdomen, left medial malleolus, left heel, left knee, left posterior thigh, and bilateral buttocks  Established colostomy for stool diversion, stoma pink  Pouching system intact  1   Present on admission partial thickness wound to left mid lateral buttock (pressure injury vs MARSI)--RESOLVED  2  Present on admission stage 2 pressure injury to right distal buttock  3   Present on admission MASD with scattered partial thickness open areas to anterior and posterior scrotum--RESOLVED  4  Present on admission stage 4 pressure injury to left distal buttock--wound bed pink, glossy, smooth  Probes to bone, muscle visible  Tunneling present at 12 and 4 o'clock  Undermining from 6-12 o'clock  Gaby-wound macerated  No odor appreciated  Large amount of serosanguinous drainage       No evidence of acute wound infection  See flowsheet for wound details  Patient [facility] at the [facility]  Plan:   1-Prevalon boot to left heel  2-Hydraguard lotion to left heel and scrotum BID and PRN  3-EHOB offloading cushion in chair when out of bed  4-Moisturize skin daily with skin nourishing cream   5-Turn/reposition q2h or when medically stable for pressure re-distribution on skin--use positioning wedges    6-P500 low air-loss mattress  7-Left distal buttock stage 4--cleanse with normal saline   Sharingforce cavilon no sting skin barrier film to gaby-wound skin   Pack wound with mesalt packing ribbon  Tape end of packing to gaby-wound skin  McCarley Halo over mesal packing and cover with Allevyn Life foam dressing    Change dressing daily and PRN with soilage  8-Right distal buttock--cleanse with soap and water, pat dry  Apply Triad paste daily and PRN with soilage  9-Ostomy care--empty pouch when 1/3 full   Check pouch every 2-4 hours for gas, release as needed   Use 2 piece 2 3/4 (blue label) pouching system     10-Wound care team will follow      Plan of care reviewed with primary RN      Patient should be discharged with [facility] for wound care and follow-up at wound center  Wound 08/26/19 Pressure Injury Buttocks Left;Distal;Lateral (Active)   Wound Image   9/23/2019  1:47 PM   Wound Description Pink 9/23/2019  1:47 PM   Staging Stage IV 9/23/2019  1:47 PM   Shelby-wound Assessment Erythema;Fragile; Maceration 9/23/2019  1:47 PM   Wound Length (cm) 7 6 cm 9/23/2019  1:47 PM   Wound Width (cm) 3 5 cm 9/23/2019  1:47 PM   Wound Depth (cm) 3 9/23/2019  1:47 PM   Calculated Wound Area (cm^2) 26 6 cm^2 9/23/2019  1:47 PM   Calculated Wound Volume (cm^3) 79 8 cm^3 9/23/2019  1:47 PM   Change in Wound Size % -[id number] 9/23/2019  1:47 PM   Tunneling in depth located at 8 2 cm at 12 o'clock & 6 3 cm  at 4 o'clock 9/16/2019  4:55 PM   Undermining 0 9/16/2019  4:55 PM   Undermining is depth extending from 12:00 9/15/2019 12:33 AM   Drainage Amount Large 9/23/2019  1:47 PM   Drainage Description Serosanguineous 9/23/2019  1:47 PM   Non-staged Wound Description Full thickness 9/23/2019  1:47 PM   Treatments Cleansed;Irrigation with NSS;Site care 9/23/2019  1:47 PM   Dressing Mesalt;Calcium Alginate; Foam, Silicon (eg  Allevyn, etc) 9/23/2019  1:47 PM   Wound packed? Yes 9/23/2019  1:47 PM   Packing- # removed 1 9/23/2019  1:47 PM   Packing- # inserted 1 9/23/2019  1:47 PM   Dressing Changed Changed 9/23/2019  1:47 PM   Patient Tolerance Tolerated well 9/23/2019  1:47 PM   Dressing Status Clean;Dry; Intact 9/23/2019  1:47 PM       Wound 09/16/19 Buttocks Right;Distal (Active)   Wound Image   9/23/2019  1:51 PM   Wound Description Pink 9/23/2019  1:51 PM   Staging Stage II 9/23/2019  1:51 PM   Shelby-wound Assessment Erythema; Intact 9/23/2019  1:51 PM   Wound Length (cm) 3 cm 9/23/2019  1:51 PM   Wound Width (cm) 1 2 cm 9/23/2019  1:51 PM   Wound Depth (cm) 0 1 9/23/2019  1:51 PM   Calculated Wound Area (cm^2) 3 6 cm^2 9/23/2019  1:51 PM   Calculated Wound Volume (cm^3) 0 36 cm^3 9/23/2019  1:51 PM   Change in Wound Size % -20 9/23/2019  1:51 PM   Tunneling 0 cm 9/23/2019  1:51 PM   Undermining 0 9/23/2019  1:51 PM   Drainage Amount None 9/23/2019  1:51 PM   Drainage Description Bloody 9/22/2019  8:00 PM   Non-staged Wound Description Partial thickness 9/23/2019  1:51 PM   Treatments Cleansed 9/23/2019  1:51 PM   Dressing Other (Comment) 9/23/2019  1:51 PM   Dressing Changed Changed 9/23/2019  1:51 PM   Patient Tolerance Tolerated well 9/23/2019  1:51 PM   Dressing Status Intact 9/23/2019  8:00 AM     [address], RN, [facility]

## 2019-09-24 VITALS
TEMPERATURE: 98 F | HEART RATE: 81 BPM | SYSTOLIC BLOOD PRESSURE: 101 MMHG | RESPIRATION RATE: 18 BRPM | OXYGEN SATURATION: 94 % | BODY MASS INDEX: 23.63 KG/M2 | DIASTOLIC BLOOD PRESSURE: 76 MMHG | WEIGHT: 150.57 LBS | HEIGHT: 67 IN

## 2019-09-24 LAB
ERYTHROCYTE [DISTWIDTH] IN BLOOD BY AUTOMATED COUNT: 17.9 % (ref 11.6–15.1)
HCT VFR BLD AUTO: 29.9 % (ref 36.5–49.3)
HGB BLD-MCNC: 8.6 G/DL (ref 12–17)
MCH RBC QN AUTO: 23.6 PG (ref 26.8–34.3)
MCHC RBC AUTO-ENTMCNC: 28.8 G/DL (ref 31.4–37.4)
MCV RBC AUTO: 82 FL (ref 82–98)
PLATELET # BLD AUTO: 564 THOUSANDS/UL (ref 149–390)
PMV BLD AUTO: 9.3 FL (ref 8.9–12.7)
RBC # BLD AUTO: 3.64 MILLION/UL (ref 3.88–5.62)
WBC # BLD AUTO: 9.87 THOUSAND/UL (ref 4.31–10.16)

## 2019-09-24 PROCEDURE — 85027 COMPLETE CBC AUTOMATED: CPT | Performed by: INTERNAL MEDICINE

## 2019-09-24 PROCEDURE — 99232 SBSQ HOSP IP/OBS MODERATE 35: CPT | Performed by: INTERNAL MEDICINE

## 2019-09-24 PROCEDURE — 99233 SBSQ HOSP IP/OBS HIGH 50: CPT | Performed by: INTERNAL MEDICINE

## 2019-09-24 RX ADMIN — MEROPENEM 1000 MG: 1 INJECTION, POWDER, FOR SOLUTION INTRAVENOUS at 06:52

## 2019-09-24 RX ADMIN — DOXYCYCLINE 100 MG: 100 CAPSULE ORAL at 20:49

## 2019-09-24 RX ADMIN — OXYCODONE HYDROCHLORIDE AND ACETAMINOPHEN 500 MG: 500 TABLET ORAL at 08:22

## 2019-09-24 RX ADMIN — OXYCODONE HYDROCHLORIDE 20 MG: 10 TABLET ORAL at 09:38

## 2019-09-24 RX ADMIN — Medication 125 MG: at 20:49

## 2019-09-24 RX ADMIN — Medication 250 MG: at 18:24

## 2019-09-24 RX ADMIN — MEROPENEM 1000 MG: 1 INJECTION, POWDER, FOR SOLUTION INTRAVENOUS at 14:32

## 2019-09-24 RX ADMIN — OXYCODONE HYDROCHLORIDE 20 MG: 10 TABLET ORAL at 21:15

## 2019-09-24 RX ADMIN — ASPIRIN 81 MG: 81 TABLET, COATED ORAL at 08:21

## 2019-09-24 RX ADMIN — ENOXAPARIN SODIUM 40 MG: 40 INJECTION SUBCUTANEOUS at 08:21

## 2019-09-24 RX ADMIN — MEROPENEM 1000 MG: 1 INJECTION, POWDER, FOR SOLUTION INTRAVENOUS at 23:07

## 2019-09-24 RX ADMIN — Medication 125 MG: at 08:21

## 2019-09-24 RX ADMIN — DOXYCYCLINE 100 MG: 100 CAPSULE ORAL at 08:22

## 2019-09-24 RX ADMIN — FOLIC ACID 500 MCG: 1 TABLET ORAL at 08:21

## 2019-09-24 RX ADMIN — Medication 250 MG: at 08:21

## 2019-09-24 RX ADMIN — OXYCODONE HYDROCHLORIDE 20 MG: 10 TABLET ORAL at 00:29

## 2019-09-24 NOTE — PROGRESS NOTES
Progress Note - Infectious Disease   Allen Isaac Moscat 63 y o  male MRN: [id number]  Unit/Bed#: E2 -01 Encounter: [id number]      Impression/Plan:  1  Sepsis  POA   Fever, tachycardia and leukocytosis   This was initially thought to be due to a complicated urinary tract infection however this is likely multifactorial given problems 2 and 3  Initial blood cultures were likely contaminated with coag-negative Staph coccus  All repeat blood cultures were negative  Fortunately the patient remains clinically stable  His WBC count has normalized  -antibiotics as below  -monitor CBC and BMP  -monitor vitals  -supportive care     2  Perineal abscess communicating with sacral/buttock ulcer  Likely involving penile prosthesis   Repeat imaging was indicative of abscess which seems to continue to spontaneously drain from the patient's sacral ulcer   He has been evaluated by 3 different surgical attendings and his wound bed is not felt to be acutely infected and therefore  he is not recommended for surgical debridement  I continue to suspect involvement of the patient's penile prosthesis given problem 3  At this time we will plan to treat this patient as a complicated wound infection given the ongoing and spontaneous drainage to provide some level of source control  He is receiving meropenem and doxycycline and is tolerating without difficulty    -continue IV meropenem through 10/01/2019 for a total of 14 days of antibiotic treatment  -continue PO Doxycycline through 10/01/2019 for total 14 days of antibiotic treatment  -monitor CBC and BMP  -monitor vitals  -serial wound exam  -continue close follow-up with wound management  -continue close follow-up with Urology  -continue close follow-up with General surgery  -recommend outpatient follow-up with Urology and General surgery shortly after discharge  -patient should complete weekly CBCD and BMP while on IV antibiotics  -Meropenem script dropped off with case management  -PICC line needs to be removed after final dose of IV antibiotics on 10/01/2019     3  Urethral erosion   Patient over the course of this admission was found to have urethral erosion  His Solomon catheter was palpable through his sacral ulcer    He is now status post suprapubic catheter placement   Given this finding I suspect involvement of the patient's penile prosthesis given its proximity to this defect  Patient has now been evaluated by 2 separate urology attendings and no surgical interventions are recommended at this time  They do not feel that his prosthesis is involved in this process   Unfortunately, if it is, the patient is at very high risk of relapse and significant comorbidity   -antibiotics as above  -monitor CBC and BMP  -monitor vitals  -continue follow-up with Urology     4  Coag-negative Staphylococcus bacteremia  Showing in one set of his initial blood cultures  I suspect this is representative of skin contamination and not in active bacteremia   Patient is clinically stable without fever or leukocytosis   Repeat blood cultures are negative after five days  -no indication for antibiotics for this issue  -monitor CBC and BMP  -monitor vitals     5  Chronic sacral ulcer   Patient with chronic sacral decubitus along with osteomyelitis   As above has been evaluated by surgery as well as wound care   Would continue local wound care as per their recommendations  Additional care as per primary service      6  Scrotal excoriations   This is likely related to the above along with patient remaining bed-bound   Would continue local wound care as per wound care recommendation  Additional care otherwise as per primary      7  Paraplegia     8  History of C diff   He has been receiving PO vancomycin prophylaxis and has been tolerating without difficulty   He is not having significant change in his ostomy output at this time    No abdominal complaints   -continue PO Vancomycin prophylaxis through 10/04/2019 which 72 hours after completion of antibiotic treatment above  -monitor stool output  -monitor abdominal symptoms     9  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   Patient unfortunately has isolated multiple different organisms from his urine and he has now developed urethral erosion as above   Suspect that these organisms are likely involved in his perineal process   -antibiotics as above    Patient is stable for discharge from ID standpoint  Above plan was discussed in detail with the patient at the bedside  Script provided to , Meño Dacosta  Above plan was discussed in detail with [facility] attending, Dr Keiry Zuniga  Antibiotics:  Meropenem 4  Doxycycline 4  PO Vancomycin 9  Antibiotics 11    Subjective:  Patient has no acute complaints today  He has no concerns with his new left PICC  He is hoping to leave the hospital soon  He reports that he met with the wound care nurse yesterday and his sacrum/buttock dressing has been intact ever since  He denies fever, chills, sweats, shakes; no nausea, vomiting, abdominal pain; no concerns with his suprapubic catheter; no concerns with his ostomy pouch; no cough, shortness of breath, or chest pain  No new symptoms  Objective:  Vitals:  Temp:  [98 1 °F (36 7 °C)-98 6 °F (37 °C)] 98 1 °F (36 7 °C)  HR:  [78-99] 99  Resp:  [18] 18  BP: (86-97)/(68-75) 86/68  SpO2:  [97 %-99 %] 99 %  Temp (24hrs), Av 4 °F (36 9 °C), Min:98 1 °F (36 7 °C), Max:98 6 °F (37 °C)  Current: Temperature: 98 1 °F (36 7 °C)    Physical Exam:   General Appearance:  Alert, interactive, nontoxic, no acute distress  Patient appearing chronically ill and debilitated  Throat: Oropharynx moist without lesions  Lungs:   Clear to auscultation bilaterally; no wheezes, rhonchi or rales; respirations unlabored   Heart:  RRR; no murmur, rub or gallop   Abdomen:   Soft, non-tender, non-distended, positive bowel sounds    Ostomy pouch intact, stool output is thin and brown; suprapubic catheter intact, no leakage or spreading erythema from site   Extremities: No clubbing or cyanosis, no edema in LLE; R stump healed; L upper arm PICC intact, dressing is dry, no spreading erythema from site; wearing L off-loading boot   Skin: No new rashes or lesions noted on exposed skin    Sacral/buttock packing and overlying dressing saturated, I did not removed for exam; scattered scrotal excoriations without active bleeding or drainage     Labs, Imaging, & Other studies:   All pertinent labs and imaging studies were personally reviewed  Results from last 7 days   Lab Units 09/24/19  0653 09/23/19  0638 09/22/19  0554   WBC Thousand/uL 9 87 8 19 8 11   HEMOGLOBIN g/dL 8 6* 8 6* 8 0*   PLATELETS Thousands/uL 564* 570* 550*     Results from last 7 days   Lab Units 09/23/19  0638 09/22/19  0554   POTASSIUM mmol/L 3 7 3 7   CHLORIDE mmol/L 103 105   CO2 mmol/L 28 28   BUN mg/dL 17 14   CREATININE mg/dL 0 54* 0 51*   EGFR ml/min/1 73sq m 116 118   CALCIUM mg/dL 9 2 8 9   AST U/L  --  18   ALT U/L  --  18   ALK PHOS U/L  --  59

## 2019-09-24 NOTE — PROGRESS NOTES
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- per surgery wound has no evidence of necrotic tissue or loculate collections  It did spontaneously drain  Pt will likely need plastic surgery for flap reconstruction but no urgent surgical intervention at this time  Urology stated that there was no connection from wound to penile implant  No indication for removal of prosthesis at this time but he will removal of the implant eventually  He will follow up with urology on 10/8 with Dr Anjana Swain  ID is highly suspicious for penile implant involvement  Pt will be treated as if this is a complicated wound infection  Antibiotics will consist of doxycycline and meropenem for total of 2 weeks (through 10/1/19)  Picc has been placed  He will need weekly cbcd and bmp while on antibiotics  Will d/c iJ central line as pt now has picc  2  Sepsis due to perineal abscess- please see above     3  Colonization with esbl e coli, mrsa, and pseudomonas of bladder- antibiotics as described in number 1     4  H/o c diff- continue c diff prophylaxis for 72 hours after broad spectrum antibiotic therapy     5  Positive blood culture- likely contaminant  Repeat blood cultures without growth       6  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       7  Neurogenic bladder- s/p suprapubic catheter     8  Acute/chronic normocytic anemia- likely due to chronic disease  Continue current treatment  H/h is stable       Subjective:   Pt seen and examined  Pt doing well  He is asking if IJ central line can be removed  He had picc placed yesterday  No f/c no cp no sob no n/v/d no abd pain  Objective:     Vitals: Blood pressure 98/63, pulse 103, temperature 98 3 °F (36 8 °C), temperature source Tympanic, resp  rate 16, height 5' 7" (1 702 m), weight 68 3 kg (150 lb 9 2 oz), SpO2 98 %  ,Body mass index is 23 58 kg/m²  Lab, Imaging and other studies:  Results from last 7 days   Lab Units 09/24/19  0653   WBC Thousand/uL 9 87   HEMOGLOBIN g/dL 8 6*   HEMATOCRIT % 29 9*   PLATELETS Thousands/uL 564*     Results from last 7 days   Lab Units 09/23/19  0638 09/22/19  0554   POTASSIUM mmol/L 3 7 3 7   CHLORIDE mmol/L 103 105   CO2 mmol/L 28 28   BUN mg/dL 17 14   CREATININE mg/dL 0 54* 0 51*   CALCIUM mg/dL 9 2 8 9   ALK PHOS U/L  --  59   ALT U/L  --  18   AST U/L  --  18         Lab Results   Component Value Date    BLOODCX No Growth After 5 Days  09/17/2019    BLOODCX No Growth After 5 Days  09/17/2019    BLOODCX No Growth After 5 Days   09/16/2019    URINECX 20,000-29,000 cfu/ml Pseudomonas aeruginosa (A) 09/14/2019    URINECX 10,000-19,000 cfu/ml Escherichia coli ESBL (A) 09/14/2019    URINECX (A) 09/14/2019     <10,000 cfu/ml Methicillin Resistant Staphylococcus aureus    URINECX 10,000-19,000 cfu/ml Enterococcus faecalis (A) 09/14/2019    URINECX (A) 09/14/2019     40,000-49,000 cfu/ml Alpha Hemolytic Streptococcus NOT Enterococcus    WOUNDCULT (A) 05/09/2018     1+ Growth of Methicillin Resistant Staphylococcus aureus    WOUNDCULT 1+ Growth of Beta Hemolytic Streptococcus Group B (A) 05/09/2018    WOUNDCULT 1+ Growth of  05/09/2018     Scheduled Meds:   Current Facility-Administered Medications:  acetaminophen 650 mg Oral Q6H PRN Odalis Walker PA-C    ascorbic acid 500 mg Oral Daily With Breakfast Axel Mayo MD    aspirin 81 mg Oral Daily Axel Mayo MD    doxycycline hyclate 100 mg Oral Q12H [address] Missy Rousseau MD    enoxaparin 40 mg Subcutaneous Daily Axel Mayo MD    folic acid 132 mcg Oral Daily Maira Lazo MD    iothalamate meglumine 15 mL Bladder Instillation Once in imaging Moses Wade PA-C    meropenem 1,000 mg Intravenous Q8H Adrianne Valencia MD Last Rate: 1,000 mg (09/24/19 3018)   oxyCODONE 20 mg Oral Q8H PRN Pamela Ferreira MD    saccharomyces boulardii 250 mg Oral BID Pamela Ferreira MD    vancomycin 125 mg Oral Q12H [address] Pamela Ferreira MD      Continuous Infusions:    PRN Meds:   acetaminophen    iothalamate meglumine    oxyCODONE      Physical exam:  Physical Exam  General appearance: alert and oriented, in no acute distress  Head: Normocephalic, without obvious abnormality, atraumatic  Eyes: conjunctivae/corneas clear  PERRL, EOM's intact  Fundi benign    Neck: no adenopathy, no carotid bruit, no JVD, supple, symmetrical, trachea midline and thyroid not enlarged, symmetric, no tenderness/mass/nodules  Lungs: clear to auscultation bilaterally  Heart: regular rate and rhythm, S1, S2 normal, no murmur, click, rub or gallop  Abdomen: soft, non-tender; bowel sounds normal; no masses,  no organomegaly  Extremities: extremities normal, warm and well-perfused; no cyanosis, clubbing, or edema  Pulses: 2+ and symmetric  Skin: not assessed  Neurologic: Mental status: Alert, oriented, thought content appropriate      VTE Pharmacologic Prophylaxis: Enoxaparin (Lovenox)  VTE Mechanical Prophylaxis: sequential compression device    Counseling / Coordination of Care  Total floor / unit time spent today 20 minutes    Current Length of Stay: 8 day(s)    Current Patient Status: Inpatient       Code Status: Level 1 - Full Code

## 2019-09-24 NOTE — PLAN OF CARE
Problem: Potential for Falls  Goal: Patient will remain free of falls  Description  INTERVENTIONS:  - Assess patient frequently for physical needs  -  Identify cognitive and physical deficits and behaviors that affect risk of falls  -  [place] fall precautions as indicated by assessment   - Educate patient/family on patient safety including physical limitations  - Instruct patient to call for assistance with activity based on assessment  - Modify environment to reduce risk of injury  - Consider OT/PT consult to assist with strengthening/mobility  Outcome: Progressing     Problem: Prexisting or High Potential for Compromised Skin Integrity  Goal: Skin integrity is maintained or improved  Description  INTERVENTIONS:  - Identify patients at risk for skin breakdown  - Assess and monitor skin integrity  - Assess and monitor nutrition and hydration status  - Monitor labs   - Turn and reposition patient  - Assist with mobility/ambulation  - Relieve pressure over bony prominences  - Avoid friction and shearing  - Provide appropriate hygiene as needed including keeping skin clean and dry  - Evaluate need for skin moisturizer/barrier cream  - Collaborate with interdisciplinary team   - Patient/family teaching  - Consider wound care consult    Outcome: Progressing     Problem: Nutrition/Hydration-ADULT  Goal: Nutrient/Hydration intake appropriate for improving, restoring or maintaining nutritional needs  Description  Monitor and assess patient's nutrition/hydration status for malnutrition  Collaborate with interdisciplinary team and initiate plan and interventions as ordered  Monitor patient's weight and dietary intake as ordered or per policy  Utilize nutrition screening tool and intervene as necessary  Determine patient's food preferences and provide high-protein, high-caloric foods as appropriate       INTERVENTIONS:  - Monitor oral intake, urinary output, labs, and treatment plans  - Assess nutrition and hydration status and recommend course of action  - Evaluate amount of meals eaten  - Assist patient with eating if necessary   - Allow adequate time for meals  - Recommend/ encourage appropriate diets, oral nutritional supplements, and vitamin/mineral supplements  - Order, calculate, and assess calorie counts as needed  - Recommend, monitor, and adjust tube feedings and TPN/PPN based on assessed needs  - Assess need for intravenous fluids  - Provide specific nutrition/hydration education as appropriate  - Include patient/family/caregiver in decisions related to nutrition  Outcome: Progressing

## 2019-09-25 DIAGNOSIS — G89.29 CHRONIC LOW BACK PAIN WITHOUT SCIATICA, UNSPECIFIED BACK PAIN LATERALITY: ICD-10-CM

## 2019-09-25 DIAGNOSIS — M54.50 CHRONIC LOW BACK PAIN WITHOUT SCIATICA, UNSPECIFIED BACK PAIN LATERALITY: ICD-10-CM

## 2019-09-25 PROBLEM — R78.81 BACTEREMIA: Status: RESOLVED | Noted: 2019-09-16 | Resolved: 2019-09-25

## 2019-09-25 PROCEDURE — 99239 HOSP IP/OBS DSCHRG MGMT >30: CPT | Performed by: INTERNAL MEDICINE

## 2019-09-25 PROCEDURE — 99233 SBSQ HOSP IP/OBS HIGH 50: CPT | Performed by: INTERNAL MEDICINE

## 2019-09-25 RX ORDER — SACCHAROMYCES BOULARDII 250 MG
250 CAPSULE ORAL 2 TIMES DAILY
Status: CANCELLED | OUTPATIENT
Start: 2019-09-25

## 2019-09-25 RX ORDER — FOLIC ACID 1 MG/1
500 TABLET ORAL DAILY
Status: CANCELLED | OUTPATIENT
Start: 2019-09-26

## 2019-09-25 RX ORDER — FOLIC ACID 1 MG/1
500 TABLET ORAL DAILY
Refills: 0
Start: 2019-09-26 | End: 2019-11-20

## 2019-09-25 RX ORDER — ASCORBIC ACID 500 MG
500 TABLET ORAL
Refills: 0
Start: 2019-09-26 | End: 2019-11-20

## 2019-09-25 RX ORDER — ASPIRIN 81 MG/1
81 TABLET ORAL DAILY
Status: CANCELLED | OUTPATIENT
Start: 2019-09-26

## 2019-09-25 RX ORDER — ACETAMINOPHEN 325 MG/1
650 TABLET ORAL EVERY 6 HOURS PRN
Status: CANCELLED | OUTPATIENT
Start: 2019-09-25

## 2019-09-25 RX ORDER — SACCHAROMYCES BOULARDII 250 MG
250 CAPSULE ORAL 2 TIMES DAILY
Refills: 0
Start: 2019-09-25 | End: 2019-11-20

## 2019-09-25 RX ORDER — ACETAMINOPHEN 325 MG/1
650 TABLET ORAL EVERY 6 HOURS PRN
Qty: 30 TABLET | Refills: 0
Start: 2019-09-25

## 2019-09-25 RX ORDER — ASCORBIC ACID 500 MG
500 TABLET ORAL
Status: CANCELLED | OUTPATIENT
Start: 2019-09-26

## 2019-09-25 RX ORDER — OXYCODONE HYDROCHLORIDE 10 MG/1
20 TABLET ORAL EVERY 8 HOURS PRN
Status: CANCELLED | OUTPATIENT
Start: 2019-09-25

## 2019-09-25 RX ORDER — DOXYCYCLINE HYCLATE 100 MG/1
100 CAPSULE ORAL EVERY 12 HOURS SCHEDULED
Refills: 0
Start: 2019-09-25 | End: 2019-10-01

## 2019-09-25 RX ORDER — OXYCODONE HYDROCHLORIDE 20 MG/1
20 TABLET ORAL EVERY 8 HOURS PRN
Qty: 10 TABLET | Refills: 0 | Status: SHIPPED | OUTPATIENT
Start: 2019-09-25 | End: 2019-10-05

## 2019-09-25 RX ORDER — DOXYCYCLINE HYCLATE 100 MG/1
100 CAPSULE ORAL EVERY 12 HOURS SCHEDULED
Status: CANCELLED | OUTPATIENT
Start: 2019-09-25

## 2019-09-25 RX ADMIN — Medication 250 MG: at 08:19

## 2019-09-25 RX ADMIN — FOLIC ACID 500 MCG: 1 TABLET ORAL at 08:19

## 2019-09-25 RX ADMIN — DOXYCYCLINE 100 MG: 100 CAPSULE ORAL at 08:22

## 2019-09-25 RX ADMIN — OXYCODONE HYDROCHLORIDE 20 MG: 10 TABLET ORAL at 05:47

## 2019-09-25 RX ADMIN — OXYCODONE HYDROCHLORIDE AND ACETAMINOPHEN 500 MG: 500 TABLET ORAL at 08:20

## 2019-09-25 RX ADMIN — ASPIRIN 81 MG: 81 TABLET, COATED ORAL at 08:22

## 2019-09-25 RX ADMIN — Medication 125 MG: at 08:22

## 2019-09-25 RX ADMIN — MEROPENEM 1000 MG: 1 INJECTION, POWDER, FOR SOLUTION INTRAVENOUS at 06:34

## 2019-09-25 RX ADMIN — MEROPENEM 1000 MG: 1 INJECTION, POWDER, FOR SOLUTION INTRAVENOUS at 14:07

## 2019-09-25 RX ADMIN — OXYCODONE HYDROCHLORIDE 20 MG: 10 TABLET ORAL at 14:10

## 2019-09-25 RX ADMIN — ENOXAPARIN SODIUM 40 MG: 40 INJECTION SUBCUTANEOUS at 08:19

## 2019-09-25 NOTE — PROGRESS NOTES
Progress Note - Infectious Disease   Adam Zhang Moscat 63 y o  male MRN: [id number]  Unit/Bed#: E2 -01 Encounter: [id number]      Impression/Plan:  1  Sepsis  POA   Fever, tachycardia and leukocytosis   This was initially thought to be due to a complicated urinary tract infection however this is likely multifactorial given problems 2 and 3  Initial blood cultures were likely contaminated with coag-negative Staph coccus   All repeat blood cultures were negative   Fortunately the patient remains clinically stable  His WBC count has normalized  -antibiotics as below  -monitor CBC and BMP  -monitor vitals  -supportive care     2  Perineal abscess communicating with sacral/buttock ulcer  Likely involving penile prosthesis   Repeat imaging was indicative of abscess which seems to continue to spontaneously drain from the patient's sacral ulcer  Uri Cote has been evaluated by 3 different surgical attendings and his wound bed is not felt to be acutely infected and therefore  he is not recommended for surgical debridement   I continue to suspect involvement of the patient's penile prosthesis given problem 3  At this time we will plan to treat this patient as a complicated wound infection given the ongoing and spontaneous drainage to provide some level of source control   He is receiving meropenem and doxycycline and is tolerating without difficulty    -continue IV meropenem through 10/01/2019 for a total of 14 days of antibiotic treatment  -continue PO Doxycycline through 10/01/2019 for total 14 days of antibiotic treatment  -monitor CBC and BMP  -monitor vitals  -serial wound exam  -continue close follow-up with wound management  -continue close follow-up with Urology  -continue close follow-up with General surgery  -recommend outpatient follow-up with Urology and General surgery shortly after discharge  -patient should complete weekly CBCD and BMP while on IV antibiotics  -PICC line needs to be removed after final dose of IV antibiotics on 10/01/2019     3  Urethral erosion   Patient over the course of this admission was found to have urethral erosion  His Solomon catheter was palpable through his sacral ulcer    He is now status post suprapubic catheter placement   Given this finding I suspect involvement of the patient's penile prosthesis given its proximity to this defect  Patient has now been evaluated by 2 separate urology attendings and no surgical interventions are recommended at this time  They do not feel that his prosthesis is involved in this process   Unfortunately, if it is, the patient is at very high risk of relapse and significant comorbidity   -antibiotics as above  -monitor CBC and BMP  -monitor vitals  -continue follow-up with Urology     4  Coag-negative Staphylococcus bacteremia  Showing in one set of his initial blood cultures  I suspect this is representative of skin contamination and not in active bacteremia   Patient is clinically stable without fever or leukocytosis   Repeat blood cultures were negative after five days  -no indication for antibiotics for this issue  -monitor CBC and BMP  -monitor vitals     5  Chronic sacral ulcer   Patient with chronic sacral decubitus along with osteomyelitis   As above has been evaluated by surgery as well as wound care   Would continue local wound care as per their recommendations  Additional care as per primary service      6  Scrotal excoriations   This is likely related to the above along with patient remaining bed-bound   Would continue local wound care PRN  Additional care otherwise as per primary      7  Paraplegia      8   History of C diff   He has been receiving PO vancomycin prophylaxis and has been tolerating without difficulty   He is not having significant change in his ostomy output at this time   No abdominal complaints   -continue PO Vancomycin prophylaxis through 10/04/2019 which 72 hours after completion of antibiotic treatment above  -monitor stool output  -monitor abdominal symptoms     9  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   Patient unfortunately has isolated multiple different organisms from his urine and he has now developed urethral erosion as above   Suspect that these organisms are likely involved in his perineal process   -antibiotics as above    Home antibiotic therapy (meropenem) was not covered by patient's insurance  He is working closely with case management to find a facility which will accept him for both wound care as well as to continue his antibiotic treatment  The patient is stable for discharge once a facility is secured  Above plan was discussed in detail with patient at the bedside  Above plan was discussed in detail with , Meño Dacosta  Antibiotics:  Meropenem 5  Doxycycline 5  PO Vancomycin 10  Antibiotics 12    Subjective:  Patient has no acute complaints  States he has been doing well this morning  He is disappointed that his insurance will not allow him to go home on the IV antibiotic but understands that he needs to finish the recommended treatment course  He has no concerns with his buttock/sacral wound today  He has no concerns with his suprapubic tube  He has no concerns with his ostomy pouch  He has no concerns with his PICC  He no fever, chills, sweats, shakes; no nausea, vomiting, cough, shortness of breath, or chest pain  Objective:  Vitals:  Temp:  [98 °F (36 7 °C)-98 8 °F (37 1 °C)] 98 °F (36 7 °C)  HR:  [] 81  Resp:  [18] 18  BP: ()/(63-76) 101/76  SpO2:  [94 %] 94 %  Temp (24hrs), Av 4 °F (36 9 °C), Min:98 °F (36 7 °C), Max:98 8 °F (37 1 °C)  Current: Temperature: 98 °F (36 7 °C)    Physical Exam:   General Appearance:  Alert, interactive, nontoxic, no acute distress  Patient appears chronically ill and debilitated  Throat: Oropharynx moist without lesions      Lungs:   Clear to auscultation bilaterally; no wheezes, rhonchi or rales; respirations unlabored   Heart:  RRR; no murmur, rub or gallop   Abdomen:   Soft, non-tender, non-distended, positive bowel sounds  Ostomy intact, stool output is small, thin, brown; suprapubic catheter intact, urine output is yellow without sediment, no leakage or spreading erythema from insertion site     Extremities: No clubbing or cyanosis, no left lower extremity edema; right stump healed; left upper arm PICC intact, no spreading erythema from site   Skin: No new rashes, lesions, or draining wounds noted on exposed skin    I did not have patient role for exam of his buttock/sacral wound today     Labs, Imaging, & Other studies:   All pertinent labs and imaging studies were personally reviewed  Results from last 7 days   Lab Units 09/24/19  0653 09/23/19  0638 09/22/19  0554   WBC Thousand/uL 9 87 8 19 8 11   HEMOGLOBIN g/dL 8 6* 8 6* 8 0*   PLATELETS Thousands/uL 564* 570* 550*     Results from last 7 days   Lab Units 09/23/19  0638 09/22/19  0554   POTASSIUM mmol/L 3 7 3 7   CHLORIDE mmol/L 103 105   CO2 mmol/L 28 28   BUN mg/dL 17 14   CREATININE mg/dL 0 54* 0 51*   EGFR ml/min/1 73sq m 116 118   CALCIUM mg/dL 9 2 8 9   AST U/L  --  18   ALT U/L  --  18   ALK PHOS U/L  --  59

## 2019-09-25 NOTE — DISCHARGE INSTRUCTIONS
DISCONTINUE PICC LINE AFTER FINAL DOSE OF IV ANTIBIOTICS ON 10/1/19  Weekly CBCD and BMP while on IV antibiotics   ---------------------------------------------------------------------------------------------------------------------------------------------------------------------------------    Suprapubic Cystostomy     WHAT YOU NEED TO KNOW:   Suprapubic cystostomy is surgery to create a stoma (opening) through your abdomen into your bladder  This opening is where a catheter is inserted to drain urine  You may need a cystostomy if your urine flow is blocked  DISCHARGE INSTRUCTIONS:   Resume your normal diet  Small sips of flat soda will help with mild nausea  Follow up with your healthcare provider as directed: You may need to return to have your suprapubic catheter changed or removed  Write down your questions so you remember to ask them during your visits  Empty your urine drainage bag: Empty your urine drainage bag when it is ½ to ? full, or every 8 hours  If you have a smaller leg bag, empty it every 3 to 4 hours  Do the following when you empty your urine drainage bag:  · Hold the urine bag over a toilet or large container  · Remove the drain spout from its sleeve at the bottom of the urine bag  Do not touch the tip of the drain spout  Open the slide valve on the spout  · Let the urine flow out of the urine bag into the toilet or container  Do not let the drainage tube touch anything  · Clean the end of the drain spout with alcohol when the bag is empty  Ask which cleaning solution is best to use  · Close the slide valve and put the drain spout into its sleeve at the bottom of the urine bag  Write down how much urine was in your bag if you were asked to keep a record  Prevent an infection:   · Clean the stoma and skin around it daily  Wash your hands before and after cystostomy care  Put on a new pair of clean medical gloves  · Change your urine bag or clean reusable bags   Ask how often you need to change or clean your urine drainage bag  You may need to change your reusable bag at least once a week  · Keep the bag below your waist  This will prevent urine from flowing back into your bladder and causing an infection or other problems  Also, keep the tube free of kinks so the urine will drain properly  Do not pull on the catheter  This can cause pain and bleeding and may cause the catheter to come out  Contact Interventional Radiology at [phone] Vivi PATIENTS: Contact Interventional Radiology at [phone]) Damir Alexandra PATIENTS: Contact Interventional Radiology at [phone]) if any of the following occur:  · You have a fever or chills  · Persistent nausea or vomiting  · You have severe pain  · You have a burning pain in your stoma  · Your stoma is red, swollen, or draining pus  · You have blood in your urine  · You have questions or concerns about your condition or care  Seek care immediately or call 911 if:   · No urine is draining into the urine bag  Wound care  Wound Care: (*Triad paste available on uru 2 in their ostomy drawer)  1-Prevalon boot to left heel  2-Hydraguard lotion to left heel and anterior scrotum BID and PRN  3-EHOB offloading cushion in chair when out of bed  4-Moisturize skin daily with skin nourishing cream   5-Turn/reposition q2h or when medically stable for pressure re-distribution on skin--use positioning wedges    6-P500 low air-loss mattress  7-Left distal buttock stage 4--cleanse with normal saline   3m cavilon no sting skin barrier film to gaby-wound skin   Pack wound with mesalt packing ribbon  Tape end of packing to gaby-wound skin   Lay Maxorb over mesal packing and cover with Allevyn Life foam dressing   Change dressing daily and PRN with soilage  8-Left lateral buttock--cleanse soap and water, pat dry   Apply Allevyn Life foam dressing (large sacral)   Lupillo with T   Change dressing every 3 days and PRN    9-Right distal buttock and posterior scrotum--cleanse with soap and water, pat dry   Apply Triad paste daily and PRN with soilage    10-Ostomy care--empty pouch when 1/3 full   Check pouch every 2-4 hours for gas, release as needed   Use 2 piece 2 3/4 (blue label) pouching system

## 2019-09-25 NOTE — SOCIAL WORK
CM had submitted a referral to [address] [place] [address] for pt is requiring IV abx as well as intensive wound care  [facility] is able to accept today  DENIS met with pt to discuss this option and after going over the benefits on this rehab pt decided it would be in his best interest to go  Pt stable for discharge today  DENIS arranged a BLS transport with [facility] for 1600       Numbers for report:   Phone: [phone]  Fax: [phone]

## 2019-09-25 NOTE — NURSING NOTE
Pt discharged at this time to [facility]  Report called in to Antwan Wheat  PICC d/c w pt for IV antibiotics  There are no further questions at this time

## 2019-09-25 NOTE — DISCHARGE SUMMARY
Discharge Summary - [facility]    Patient Information: Alisa Arguello 63 y o  male MRN: [id number]  Unit/Bed#: E2 -01 Encounter: [id number]    Discharging Physician / Practitioner: Josefina South DO  PCP: Kyler Desai MD  Admission Date: 9/16/2019  Discharge Date: 09/25/19    Disposition:     ltach at [facility]    Reason for Admission: sepsis     Discharge Diagnoses:     Principal Problem (Resolved): Complicated urinary tract infection  Active Problems:    Stage IV pressure ulcer of sacral region ([facility] [address] )    Anemia    Paraplegic spinal paralysis ([facility] [address] )    Diabetes mellitus type II, controlled ([facility] [address] )    Opioid use    Colostomy in place [facility])    Neurogenic bladder    History of Clostridium difficile colitis    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([facility] [address] )    Stage II pressure ulcer of buttock    Abscess    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, stage 3 ([facility] [address] )  Resolved Problems:    Bacteremia    Sepsis ([facility] [address] )      Consultations During Hospital Stay:  · Urology  · ID  · gen surgery    Procedures Performed:     None    Significant Findings / Test Results:   Ct Abdomen Pelvis Wo Contrast  Result Date: 9/14/2019  Impression: Unremarkable left lower quadrant colostomy  No evidence of obstruction Small amount of fluid within the left perirectal/mesorectal space, likely residual to prior infection, without significant evidence of a deep pelvic and perineal soft tissue infection as seen on prior exam of 8/23/2019  Residual focus of air seen along the right inferior pubic rami, which may be ulcer related     Xr Chest Portable - 1 View  Result Date: 9/15/2019  Impression: No acute cardiopulmonary disease  Ct Abdomen Pelvis W Contrast  Result Date: 9/17/2019  Impression: Left-sided decubitus ulcer extending to the left pubic bone/fascia and into the peritoneum unchanged in size from the prior studies  There is an abscess adjacent to the peritoneal component measuring 4 8 x 2 4 x 4 8 cm   Collection adjacent to the distal sigmoid/rectum, previously communicated with the decubitus ulcer on the 8/23/2019 study and is in keeping with an abscess  Dimensions of 4 4 x 2 9 cm  Extensive chronic osteomyelitis of portions of the left pubic bone, right pubic bone and right acetabulum unchanged from the prior studies  The study was marked in [facility] for immediate notification  Ct Cystogram  Result Date: 9/19/2019  Impression: 1  No fistulous tract or active extravasation from the bladder  2   Stable decubitus ulcer overlying the left ischium extending to the perineum and around the urethra/penile prosthesis  Stable underlying chronic osteomyelitis  Incidental Findings:   · none    Test Results Pending at Discharge (will require follow up): · None    Outpatient Tests Requested:  Cbc, bmp weekly while on antibiotics    Hospital Course: Sheree England is a 63 y o  male patient who originally presented to the hospital on 9/16/2019 due to stage IV pressure ulcer of the sacral region  He was evaluated by surgery, urology and ID due to sepsis from perineal abscess that is communicating with sacral ulcer and likely penile prosthesis involvement  Surgery reported that the wound has no evidence of necrotic tissue or loculate collections  It did spontaneously drain  Pt will likely need plastic surgery for flap reconstruction after acute infection is treated but no urgent surgical intervention at this time  Urology stated that there was no connection from wound to penile implant  No indication for removal of prosthesis at this time but he will eventually need removal of the implant  He will follow up with urology on 10/8 with Dr Cesario Yu  ID is highly suspicious for penile implant involvement  Pt will be treated as if this is a complicated wound infection  Antibiotics will consist of doxycycline and meropenem for total of 2 weeks (through 10/1/19)  Picc has been placed   He will need weekly cbcd and bmp while on antibiotics      He does have colonization with esbl e coli, mrsa, and pseudomonas of bladder  Pt has a H/o c diff and he will  continue c diff prophylaxis for 72 hours after broad spectrum antibiotic therapy   Pt had Positive blood culture which is likely contaminant  Repeat blood cultures were  without growth      He has a history of 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 and also had neurogenic bladder in which he is s/p suprapubic catheter  He was discharged to [facility]    Discharge Day Visit / Exam:     * Please refer to separate progress note for these details *    Discharge instructions/Information to patient and family:   See after visit summary for information provided to patient and family  Provisions for Follow-Up Care:  See after visit summary for information related to follow-up care and any pertinent home health orders  Discharge Statement:  I spent 37 minutes discharging the patient  This time was spent on the day of discharge  I had direct contact with the patient on the day of discharge  Greater than 50% of the total time was spent examining patient, answering all patient questions, arranging and discussing plan of care with patient as well as directly providing post-discharge instructions  Additional time then spent on discharge activities  Discharge Medications:  See after visit summary for reconciled discharge medications provided to patient and family

## 2019-09-25 NOTE — PROGRESS NOTES
[facility] Progress Note  Patient: Aline Alejandro 63 y o  male   MRN: [id number]  PCP: Gurinder Arambula MD  Unit/Bed#: E2 MS Culp-01 Encounter: [id number]  Date Of Visit: 09/25/19      Assessment/plan  1  Stage IV pressure ulcer of the sacral region- evaluated by surgery, urology and ID due to perineal abscess that is communicating with sacral ulcer and likely penile prosthesis involvement- per surgery wound has no evidence of necrotic tissue or loculate collections  It did spontaneously drain  Pt will likely need plastic surgery for flap reconstruction after acute infection is trated but no urgent surgical intervention at this time  Urology stated that there was no connection from wound to penile implant  No indication for removal of prosthesis at this time but he will eventually need removal of the implant  He will follow up with urology on 10/8 with Dr Parvin Marcelino  ID is highly suspicious for penile implant involvement  Pt will be treated as if this is a complicated wound infection  Antibiotics will consist of doxycycline and meropenem for total of 2 weeks (through 10/1/19)  Picc has been placed  He will need weekly cbcd and bmp while on antibiotics       2  Sepsis due to perineal abscess- please see above     3  Colonization with esbl e coli, mrsa, and pseudomonas of bladder- antibiotics as described in number 1     4  H/o c diff- continue c diff prophylaxis for 72 hours after broad spectrum antibiotic therapy     5  Positive blood culture- likely contaminant  Repeat blood cultures without growth       6  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       7  Neurogenic bladder- s/p suprapubic catheter     8  Acute/chronic normocytic anemia- likely due to chronic disease  Continue current treatment  H/h is stable      dispo- continue IV antibiotics until 10/1     Subjective:   Pt seen and examined  Pt denies f/c no cp no sob no n/v/d no abd pain   He states he feels a little better since IJ central line was removed  Objective:     Vitals: Blood pressure 101/76, pulse 81, temperature 98 °F (36 7 °C), temperature source Tympanic, resp  rate 18, height 5' 7" (1 702 m), weight 68 3 kg (150 lb 9 2 oz), SpO2 94 %  ,Body mass index is 23 58 kg/m²  Lab, Imaging and other studies:  Results from last 7 days   Lab Units 09/24/19  0653   WBC Thousand/uL 9 87   HEMOGLOBIN g/dL 8 6*   HEMATOCRIT % 29 9*   PLATELETS Thousands/uL 564*     Results from last 7 days   Lab Units 09/23/19  0638 09/22/19  0554   POTASSIUM mmol/L 3 7 3 7   CHLORIDE mmol/L 103 105   CO2 mmol/L 28 28   BUN mg/dL 17 14   CREATININE mg/dL 0 54* 0 51*   CALCIUM mg/dL 9 2 8 9   ALK PHOS U/L  --  59   ALT U/L  --  18   AST U/L  --  18         Lab Results   Component Value Date    BLOODCX No Growth After 5 Days  09/17/2019    BLOODCX No Growth After 5 Days  09/17/2019    BLOODCX No Growth After 5 Days   09/16/2019    URINECX 20,000-29,000 cfu/ml Pseudomonas aeruginosa (A) 09/14/2019    URINECX 10,000-19,000 cfu/ml Escherichia coli ESBL (A) 09/14/2019    URINECX (A) 09/14/2019     <10,000 cfu/ml Methicillin Resistant Staphylococcus aureus    URINECX 10,000-19,000 cfu/ml Enterococcus faecalis (A) 09/14/2019    URINECX (A) 09/14/2019     40,000-49,000 cfu/ml Alpha Hemolytic Streptococcus NOT Enterococcus    WOUNDCULT (A) 05/09/2018     1+ Growth of Methicillin Resistant Staphylococcus aureus    WOUNDCULT 1+ Growth of Beta Hemolytic Streptococcus Group B (A) 05/09/2018    WOUNDCULT 1+ Growth of  05/09/2018     Scheduled Meds:   Current Facility-Administered Medications:  acetaminophen 650 mg Oral Q6H PRN Odalis Walker PA-C    ascorbic acid 500 mg Oral Daily With Breakfast Jackeline Verduzco MD    aspirin 81 mg Oral Daily Jackeline Verduzco MD    doxycycline hyclate 100 mg Oral Q12H [address] Krupa Garcia MD    enoxaparin 40 mg Subcutaneous Daily Jackeline Verduzco MD    folic acid 836 mcg Oral Daily Jackeline Verduzco MD    iothalamate meglumine 15 mL Bladder Instillation Once in imaging Dominic Hubbard PA-C    meropenem 1,000 mg Intravenous Q8H Janessa Larkin MD Last Rate: 1,000 mg (09/25/19 3302)   oxyCODONE 20 mg Oral Q8H PRN Bhavin Middleton MD    saccharomyces boulardii 250 mg Oral BID Bhavin Middleton MD    vancomycin 125 mg Oral Q12H [address] Bhavin Middleton MD      Continuous Infusions:    PRN Meds:   acetaminophen    iothalamate meglumine    oxyCODONE      Physical exam:  Physical Exam   Constitutional: He is oriented to person, place, and time  No distress  HENT:   Head: Normocephalic and atraumatic  Eyes: Pupils are equal, round, and reactive to light  EOM are normal    Neck: Normal range of motion  Neck supple  Cardiovascular: Normal rate, regular rhythm and normal heart sounds  Exam reveals no friction rub  No murmur heard  Pulmonary/Chest: Effort normal and breath sounds normal  No stridor  No respiratory distress  He has no wheezes  Abdominal: Soft  Bowel sounds are normal  He exhibits no distension  There is no tenderness  There is no guarding  Musculoskeletal: Normal range of motion  Neurological: He is alert and oriented to person, place, and time  Skin: He is not diaphoretic       VTE Pharmacologic Prophylaxis: Enoxaparin (Lovenox)  VTE Mechanical Prophylaxis: sequential compression device    Counseling / Coordination of Care  Total floor / unit time spent today 20 minutes    Current Length of Stay: 9 day(s)    Current Patient Status: Inpatient       Code Status: Level 1 - Full Code

## 2019-09-26 RX ORDER — IBUPROFEN 800 MG/1
TABLET ORAL
Qty: 60 TABLET | Refills: 0 | OUTPATIENT
Start: 2019-09-26

## 2019-10-04 DIAGNOSIS — K21.9 GASTROESOPHAGEAL REFLUX DISEASE WITHOUT ESOPHAGITIS: ICD-10-CM

## 2019-10-04 DIAGNOSIS — G89.29 CHRONIC LOW BACK PAIN WITHOUT SCIATICA, UNSPECIFIED BACK PAIN LATERALITY: ICD-10-CM

## 2019-10-04 DIAGNOSIS — M54.50 CHRONIC LOW BACK PAIN WITHOUT SCIATICA, UNSPECIFIED BACK PAIN LATERALITY: ICD-10-CM

## 2019-10-04 RX ORDER — ASPIRIN 81 MG
TABLET, DELAYED RELEASE (ENTERIC COATED) ORAL
Qty: 30 TABLET | Refills: 0 | Status: SHIPPED | OUTPATIENT
Start: 2019-10-04 | End: 2020-01-31

## 2019-10-04 RX ORDER — IBUPROFEN 800 MG/1
TABLET ORAL
Qty: 60 TABLET | Refills: 0 | OUTPATIENT
Start: 2019-10-04

## 2019-10-04 RX ORDER — OMEPRAZOLE 20 MG/1
CAPSULE, DELAYED RELEASE ORAL
Qty: 90 CAPSULE | Refills: 0 | Status: SHIPPED | OUTPATIENT
Start: 2019-10-04 | End: 2019-12-27 | Stop reason: SDUPTHER

## 2019-10-08 ENCOUNTER — OFFICE VISIT (OUTPATIENT)
Dept: UROLOGY | Facility: MEDICAL CENTER | Age: 59
End: 2019-10-08
Payer: MEDICARE

## 2019-10-08 VITALS
DIASTOLIC BLOOD PRESSURE: 78 MMHG | SYSTOLIC BLOOD PRESSURE: 116 MMHG | BODY MASS INDEX: 23.54 KG/M2 | WEIGHT: 150 LBS | HEIGHT: 67 IN | HEART RATE: 70 BPM

## 2019-10-08 DIAGNOSIS — E11.9 TYPE 2 DIABETES MELLITUS WITHOUT COMPLICATION, WITHOUT LONG-TERM CURRENT USE OF INSULIN (HCC): ICD-10-CM

## 2019-10-08 DIAGNOSIS — T83.490A MALFUNCTION OF PENILE PROSTHESIS, INITIAL ENCOUNTER (HCC): Primary | ICD-10-CM

## 2019-10-08 PROCEDURE — 99214 OFFICE O/P EST MOD 30 MIN: CPT | Performed by: UROLOGY

## 2019-10-08 RX ORDER — OXYCODONE HCL 20 MG/1
20 TABLET, FILM COATED, EXTENDED RELEASE ORAL EVERY 12 HOURS SCHEDULED
COMMUNITY
End: 2019-10-10 | Stop reason: SDUPTHER

## 2019-10-08 RX ORDER — LANCETS
EACH MISCELLANEOUS
Qty: 102 EACH | Refills: 0 | Status: SHIPPED | OUTPATIENT
Start: 2019-10-08 | End: 2019-11-27 | Stop reason: SDUPTHER

## 2019-10-08 NOTE — PROGRESS NOTES
H&P Exam - Urology   Chino Arguello 63 y o  male MRN: [id number]  Unit/Bed#:  Encounter: [id number]    Assessment/Plan     Assessment:  Nonfunctioning penile prosthesis  Plan:  Explantation of all components of 3 part inflatable penile prosthesis    History of Present Illness   HPI:  Fortunato Rodriguez is a 63 y o  male who presents with a nonfunctioning penile prosthesis  The patient is well known to me with a longstanding history of a spinal cord cyst resulting in paraplegia  The patient initially was seen by me for treatment of erectile dysfunction and ultimately underwent implantation of a 3 part inflatable penile prosthesis  The prosthesis was utilized by the patient for many years without difficulty  Unfortunately because of the patient's debilitation he developed a sacral decubitus ulcer which ultimately eroded into the proximal right penile corpora  This resulted in explantation of the patient's right penile implant cylinder and associated rear tip extender with the pump left cylinder and rear tip extender as well as his reservoir being maintained in a functional status with the patient continuing to be sexually active with that  Recently the patient was admitted to [facility] with sepsis and at that time examination of the penile implant failed to reveal any erosion into the implant however the implant now is nonfunctional and very close to impending erosion  From a urologic standpoint the patient eroded into his urethra which now is obliterated with scar tissue with urinary drainage being achieved through the use of a suprapubic cystostomy tube  The patient now presents for explantation of all of the remaining components of his 3 part inflatable penile prosthesis  He is aware of the possibility of infection need for drainage possible need for future additional surgeries  Review of Systems   Constitutional: Positive for activity change, fatigue and unexpected weight change  Eyes: Positive for visual disturbance  Respiratory: Negative  Cardiovascular: Negative  Gastrointestinal: Negative  Colostomy   Genitourinary:        SP tube   Musculoskeletal: Negative  Neurological: Negative  Psychiatric/Behavioral: Negative          Historical Information   Past Medical History:   Diagnosis Date    Anemia     Blind     r eye    Chronic cystitis     Col[facility])     Detrusor sphincter dyssynergia     Diabetes mellitus ([facility] [address] )     Poorly controlled type 2; Last Assessed:  3/18/14    Erectile dysfunction     Frequency of urination     GERD (gastroesophageal reflux disease)     History of diabetes mellitus     History of osteomyelitis     Hx of leg amputation (HCC)     r high upper leg    Hyperlipidemia     Hypertension     Hypospadias     Incomplete bladder emptying     Neurogenic bladder     Paralysis (HCC)     [facility]     Spinal cord cysts     Ulcer of sacral region ([facility] [address] )     Urge incontinence      Past Surgical History:   Procedure Laterality Date    AMPUTATION      At hip; Last Assessed:  1/19/16    BLADDER SURGERY      COLON SURGERY      llq ostomy pouch    COLOSTOMY      COMPLEX CYSTOMETROGRAM  2014    CT CYSTOGRAM  9/19/2019    CYSTOSCOPY  2014    IR PICC REPO  9/23/2019    IR SUPRAPUBIC TUBE  9/19/2019    LEG AMPUTATION      MEATOTOMY      PENILE PROSTHESIS IMPLANT  2011    VA ADJ TISS XFER SCALP,EXTREM 10 1-30 SQCM Left 5/1/2017    Procedure: POSTERIOR THIGH V-Y ADMANCEMENT;  Surgeon: Stanislaw Kingsley MD;  [facility]: [facility];  Service: Plastics    VA MUSCLE-SKIN FLAP,TRUNK Left 5/1/2017    Procedure: FLAP CLOSURE LEFT ISCHIAL WOUND and "RIGHT" ISCHIAL FLAP ADVANCEMENT * DEBRIDEMENT, VAC PLACEMENT ;  Surgeon: Stanislaw Kingsley MD;  Location: [facility];  Service: Plastics    VA MUSCLE-SKIN FLAP,TRUNK Left 9/27/2017    Procedure: gluteal myocutaneous rotational flap, posterior thigh v to y advancement- wound 5 x 2 5 x 8; Surgeon: Cy Castelan MD;  Location: [facility];  Service: Plastics    SPINE SURGERY      Lower back    UROFLOWMETRY SIMPLE / COMPLEX       Social History   Social History     Substance and Sexual Activity   Alcohol Use Never    Frequency: Never    Comment: Per [employer]:  Social drinker (Onset date:  11/10/17)     Social History     Substance and Sexual Activity   Drug Use No     Social History     Tobacco Use   Smoking Status Former Smoker    Packs/day: 0 50    Years: 10 00    Pack years: 5 00    Last attempt to quit: Angelica Vega Years since quittin 7   Smokeless Tobacco Never Used   Tobacco Comment    Onset date:  11/10/17     Family History:   Family History   Problem Relation Age of Onset    No Known Problems Mother     No Known Problems Father        Meds/Allergies   all medications and allergies reviewed  No Known Allergies    Objective   Vitals: Blood pressure 116/78, pulse 70, height 5' 7" (1 702 m), weight 68 kg (150 lb)  [unfilled]    Invasive Devices     Peripherally Inserted Central Catheter Line            PICC Line  Left Basilic 14 days          Drain            Colostomy Descending/sigmoid LLQ -- days    Suprapubic Catheter Non-latex 16 Fr  18 days                Physical Exam   Constitutional: He is oriented to person, place, and time  No distress  HENT:   Head: Normocephalic  Blind right eye   Neck: Neck supple  Cardiovascular: Normal rate, regular rhythm and normal heart sounds  Pulmonary/Chest: Effort normal and breath sounds normal  No respiratory distress  Abdominal: Soft  Bowel sounds are normal  He exhibits no distension  There is no tenderness  There is no guarding  Left-sided colostomy with an SP tube in place   Genitourinary:   Genitourinary Comments: Penis with widely patent hypospadiac eroded meatus  Left penile cylinder in place and deflated  Scrotum normal with scrotal pump easily palpable     Neurological: He is alert and oriented to person, place, and time  Skin: He is not diaphoretic  Psychiatric: He has a normal mood and affect  His behavior is normal  Judgment and thought content normal    Vitals reviewed  Lab Results: I have personally reviewed pertinent reports  Imaging: I have personally reviewed pertinent reports  EKG, Pathology, and Other Studies: I have personally reviewed pertinent films in PACS  VTE Prophylaxis: Sequential compression device (Venodyne)  and Heparin    Code Status: [unfilled]  Advance Directive and Living Will:      Power of :    POLST:      Counseling / Coordination of Care  Total floor / unit time spent today 35 minutes  Greater than 50% of total time was spent with the patient and / or family counseling and / or coordination of care  A description of the counseling / coordination of care: Jayleen Press

## 2019-10-08 NOTE — TELEPHONE ENCOUNTER
I spoke to the patient and scheduled his IPP Explant with Dr Sandro Ellis at [facility] for 12/13/19  No sooner availability  Per Dr Sandro Ellis, patient does not have an active infection and his procedure would be fine in December

## 2019-10-08 NOTE — H&P
Dana Smith MD   Physician   Urology   Progress Notes   Sign at close encounter   Encounter Date:  10/8/2019            Progress Notes     Expand All Collapse All      Show:Clear all  [x]Manual[x]Template[]Copied    Added by:  [x]Cuong Ardon MD    []Emi for details  H&P Exam - Urology   Bakari Arguello 63 y o  male MRN: [id number]  Unit/Bed#:  Encounter: [id number]     Assessment/Plan      Assessment:  Nonfunctioning penile prosthesis  Plan:  Explantation of all components of 3 part inflatable penile prosthesis     History of Present Illness         HPI:  Veronica Vidales is a 63 y o  male who presents with a nonfunctioning penile prosthesis  The patient is well known to me with a longstanding history of a spinal cord cyst resulting in paraplegia  The patient initially was seen by me for treatment of erectile dysfunction and ultimately underwent implantation of a 3 part inflatable penile prosthesis  The prosthesis was utilized by the patient for many years without difficulty  Unfortunately because of the patient's debilitation he developed a sacral decubitus ulcer which ultimately eroded into the proximal right penile corpora  This resulted in explantation of the patient's right penile implant cylinder and associated rear tip extender with the pump left cylinder and rear tip extender as well as his reservoir being maintained in a functional status with the patient continuing to be sexually active with that  Recently the patient was admitted to [facility] with sepsis and at that time examination of the penile implant failed to reveal any erosion into the implant however the implant now is nonfunctional and very close to impending erosion  From a urologic standpoint the patient eroded into his urethra which now is obliterated with scar tissue with urinary drainage being achieved through the use of a suprapubic cystostomy tube    The patient now presents for explantation of all of the remaining components of his 3 part inflatable penile prosthesis  He is aware of the possibility of infection need for drainage possible need for future additional surgeries      Review of Systems   Constitutional: Positive for activity change, fatigue and unexpected weight change  Eyes: Positive for visual disturbance  Respiratory: Negative  Cardiovascular: Negative  Gastrointestinal: Negative  Colostomy   Genitourinary:        SP tube   Musculoskeletal: Negative  Neurological: Negative      Psychiatric/Behavioral: Negative           Historical Information         Medical History        Past Medical History:   Diagnosis Date    Anemia      Blind       r eye    Chronic cystitis      Colostomy in place [facility])      Detrusor sphincter dyssynergia      Diabetes mellitus ([facility] [address] )       Poorly controlled type 2; Last Assessed:  3/18/14    Erectile dysfunction      Frequency of urination      GERD (gastroesophageal reflux disease)      History of diabetes mellitus      History of osteomyelitis      Hx of leg amputation (HCC)       r high upper leg    Hyperlipidemia      Hypertension      Hypospadias      Incomplete bladder emptying      Neurogenic bladder      Paralysis (HCC)      Paraplegia (HCC)      Spinal cord cysts      Ulcer of sacral region [facility])      Urge incontinence           Surgical History         Past Surgical History:   Procedure Laterality Date    AMPUTATION         At hip; Last Assessed:  1/19/16    BLADDER SURGERY        COLON SURGERY         llq ostomy pouch    COLOSTOMY        COMPLEX CYSTOMETROGRAM   2014    CT CYSTOGRAM   9/19/2019    CYSTOSCOPY   2014    IR PICC REPO   9/23/2019    IR SUPRAPUBIC TUBE   9/19/2019    LEG AMPUTATION        MEATOTOMY        PENILE PROSTHESIS IMPLANT   2011    NM ADJ TISS XFER SCALP,EXTREM 10 1-30 SQCM Left 5/1/2017     Procedure: POSTERIOR THIGH V-Y ADMANCEMENT;  Surgeon: Janet Wooten MD; Location: [facility];  Service: Plastics    DC MUSCLE-SKIN FLAP,TRUNK Left 2017     Procedure: FLAP CLOSURE LEFT ISCHIAL WOUND and "RIGHT" ISCHIAL FLAP ADVANCEMENT * DEBRIDEMENT, VAC PLACEMENT ;  Surgeon: Francesca Ocampo MD;  [facility];  Service: Plastics    DC MUSCLE-SKIN FLAP,TRUNK Left 2017     Procedure: gluteal myocutaneous rotational flap, posterior thigh v to y advancement- wound 5 x 2 5 x 8;  Surgeon: Francesca Ocampo MD;  [facility];  Service: Plastics    SPINE SURGERY         Lower back    UROFLOWMETRY SIMPLE / COMPLEX            Social History         Social History           Substance and Sexual Activity   Alcohol Use Never    Frequency: Never     Comment: Per [employer]:  Social drinker (Onset date:  11/10/17)      Social History          Substance and Sexual Activity   Drug Use No      Social History           Tobacco Use   Smoking Status Former Smoker    Packs/day: 0 50    Years: 10 00    Pack years: 5 00    Last attempt to quit: Eulalia Kaylah Years since quittin 7   Smokeless Tobacco Never Used   Tobacco Comment     Onset date:  11/10/17      Family History:         Family History   Problem Relation Age of Onset    No Known Problems Mother      No Known Problems Father           Meds/Allergies   all medications and allergies reviewed  No Known Allergies     Objective   Vitals: Blood pressure 116/78, pulse 70, height 5' 7" (1 702 m), weight 68 kg (150 lb)     [unfilled]         Invasive Devices            Peripherally Inserted Central Catheter Line                     PICC Line  Left Basilic 14 days                   Drain                     Colostomy Descending/sigmoid LLQ -- days      Suprapubic Catheter Non-latex 16 Fr  18 days                     Physical Exam   Constitutional: He is oriented to person, place, and time  No distress  HENT:   Head: Normocephalic  Blind right eye   Neck: Neck supple     Cardiovascular: Normal rate, regular rhythm and normal heart sounds  Pulmonary/Chest: Effort normal and breath sounds normal  No respiratory distress  Abdominal: Soft  Bowel sounds are normal  He exhibits no distension  There is no tenderness  There is no guarding  Left-sided colostomy with an SP tube in place   Genitourinary:   Genitourinary Comments: Penis with widely patent hypospadiac eroded meatus  Left penile cylinder in place and deflated  Scrotum normal with scrotal pump easily palpable  Neurological: He is alert and oriented to person, place, and time  Skin: He is not diaphoretic  Psychiatric: He has a normal mood and affect  His behavior is normal  Judgment and thought content normal    Vitals reviewed         Lab Results: I have personally reviewed pertinent reports  Imaging: I have personally reviewed pertinent reports  EKG, Pathology, and Other Studies: I have personally reviewed pertinent films in PACS  VTE Prophylaxis: Sequential compression device (Venodyne)  and Heparin     Code Status: [unfilled]  Advance Directive and Living Will:      Power of :    POLST:       Counseling / Coordination of Care  Total floor / unit time spent today 35 minutes  Greater than 50% of total time was spent with the patient and / or family counseling and / or coordination of care  A description of the counseling / coordination of care: Hilda Acharya

## 2019-10-09 PROBLEM — T83.490A MALFUNCTION OF PENILE PROSTHESIS (HCC): Status: ACTIVE | Noted: 2019-10-09

## 2019-10-09 RX ORDER — CIPROFLOXACIN 500 MG/1
500 TABLET, FILM COATED ORAL EVERY 12 HOURS SCHEDULED
Qty: 6 TABLET | Refills: 0 | Status: SHIPPED | OUTPATIENT
Start: 2019-10-09 | End: 2019-10-12

## 2019-10-09 NOTE — TELEPHONE ENCOUNTER
-instructions given verbally and mailed  -patient aware to stop any potentially blood thinning meds 7 days prior  -patient aware PAT's are due approx   10 days prior  -patient aware to start ABX 3 days prior  -no clearance ordered  -no auth required

## 2019-10-10 ENCOUNTER — TELEPHONE (OUTPATIENT)
Dept: UROLOGY | Facility: MEDICAL CENTER | Age: 59
End: 2019-10-10

## 2019-10-10 DIAGNOSIS — G82.20 PARAPLEGIC SPINAL PARALYSIS (HCC): Primary | ICD-10-CM

## 2019-10-10 RX ORDER — OXYCODONE HCL 20 MG/1
20 TABLET, FILM COATED, EXTENDED RELEASE ORAL EVERY 12 HOURS SCHEDULED
Qty: 60 TABLET | Refills: 0 | Status: SHIPPED | OUTPATIENT
Start: 2019-10-10 | End: 2019-11-05 | Stop reason: HOSPADM

## 2019-10-10 NOTE — TELEPHONE ENCOUNTER
Spoke to Shabnam  SP tube was place in IR on 9/19  Questioning at what point it was changed and if they are to resume changes  If so orders to be faxed to [phone]  She believes it may have abad changed at [facility]  Pt does not remember  She will see if their office can review records from [facility] to see if it indicates SP tube change and call office back

## 2019-10-17 ENCOUNTER — APPOINTMENT (OUTPATIENT)
Dept: WOUND CARE | Facility: HOSPITAL | Age: 59
End: 2019-10-17
Payer: MEDICARE

## 2019-10-17 PROCEDURE — 99213 OFFICE O/P EST LOW 20 MIN: CPT

## 2019-10-17 PROCEDURE — 11042 DBRDMT SUBQ TIS 1ST 20SQCM/<: CPT

## 2019-10-17 PROCEDURE — 97597 DBRDMT OPN WND 1ST 20 CM/<: CPT

## 2019-10-18 ENCOUNTER — TELEPHONE (OUTPATIENT)
Dept: FAMILY MEDICINE CLINIC | Facility: CLINIC | Age: 59
End: 2019-10-18

## 2019-10-18 NOTE — TELEPHONE ENCOUNTER
Pt is having issues with medication and insurance, stating insurance has tried contacting us  He also states he's been trying to resolve for about two weeks now  Please have someone from clinical who is Romansh speaking reach out to pt

## 2019-10-21 ENCOUNTER — TELEPHONE (OUTPATIENT)
Dept: FAMILY MEDICINE CLINIC | Facility: CLINIC | Age: 59
End: 2019-10-21

## 2019-10-28 ENCOUNTER — APPOINTMENT (EMERGENCY)
Dept: CT IMAGING | Facility: HOSPITAL | Age: 59
DRG: 987 | End: 2019-10-28
Payer: MEDICARE

## 2019-10-28 ENCOUNTER — APPOINTMENT (EMERGENCY)
Dept: RADIOLOGY | Facility: HOSPITAL | Age: 59
DRG: 987 | End: 2019-10-28
Payer: MEDICARE

## 2019-10-28 ENCOUNTER — HOSPITAL ENCOUNTER (INPATIENT)
Facility: HOSPITAL | Age: 59
LOS: 8 days | Discharge: HOME HEALTH | DRG: 987 | End: 2019-11-05
Attending: EMERGENCY MEDICINE | Admitting: SURGERY
Payer: MEDICARE

## 2019-10-28 DIAGNOSIS — R50.9 FEVER: Primary | ICD-10-CM

## 2019-10-28 DIAGNOSIS — L89.624 STAGE IV PRESSURE ULCER OF LEFT HEEL (HCC): Chronic | ICD-10-CM

## 2019-10-28 DIAGNOSIS — T83.61XD INFECTION AND INFLAMMATORY REACTION DUE TO IMPLANTED PENILE PROSTHESIS, SUBSEQUENT ENCOUNTER: ICD-10-CM

## 2019-10-28 DIAGNOSIS — K21.9 GASTROESOPHAGEAL REFLUX DISEASE WITHOUT ESOPHAGITIS: ICD-10-CM

## 2019-10-28 DIAGNOSIS — T83.490D MALFUNCTION OF PENILE PROSTHESIS, SUBSEQUENT ENCOUNTER: ICD-10-CM

## 2019-10-28 DIAGNOSIS — E11.22 CONTROLLED TYPE 2 DIABETES MELLITUS WITH STAGE 1 CHRONIC KIDNEY DISEASE, UNSPECIFIED WHETHER LONG TERM INSULIN USE (HCC): ICD-10-CM

## 2019-10-28 DIAGNOSIS — K56.609 BOWEL OBSTRUCTION (HCC): ICD-10-CM

## 2019-10-28 DIAGNOSIS — I10 BENIGN ESSENTIAL HYPERTENSION: ICD-10-CM

## 2019-10-28 DIAGNOSIS — N39.0 UTI (URINARY TRACT INFECTION): ICD-10-CM

## 2019-10-28 DIAGNOSIS — I95.9 HYPOTENSION: ICD-10-CM

## 2019-10-28 DIAGNOSIS — F11.90 CHRONIC, CONTINUOUS USE OF OPIOIDS: Chronic | ICD-10-CM

## 2019-10-28 DIAGNOSIS — N18.1 CONTROLLED TYPE 2 DIABETES MELLITUS WITH STAGE 1 CHRONIC KIDNEY DISEASE, UNSPECIFIED WHETHER LONG TERM INSULIN USE (HCC): ICD-10-CM

## 2019-10-28 DIAGNOSIS — Z86.19 HISTORY OF CLOSTRIDIUM DIFFICILE COLITIS: ICD-10-CM

## 2019-10-28 DIAGNOSIS — A41.9 SEPSIS (HCC): ICD-10-CM

## 2019-10-28 DIAGNOSIS — L89.324 DECUBITUS ULCER OF ISCHIUM, LEFT, STAGE IV (HCC): ICD-10-CM

## 2019-10-28 LAB
ALBUMIN SERPL BCP-MCNC: 2.1 G/DL (ref 3.5–5)
ALBUMIN SERPL BCP-MCNC: 2.1 G/DL (ref 3.5–5)
ALP SERPL-CCNC: 80 U/L (ref 46–116)
ALP SERPL-CCNC: 86 U/L (ref 46–116)
ALT SERPL W P-5'-P-CCNC: 13 U/L (ref 12–78)
ALT SERPL W P-5'-P-CCNC: 14 U/L (ref 12–78)
AMORPH URATE CRY URNS QL MICRO: ABNORMAL /HPF
ANION GAP SERPL CALCULATED.3IONS-SCNC: 10 MMOL/L (ref 4–13)
AST SERPL W P-5'-P-CCNC: 51 U/L (ref 5–45)
AST SERPL W P-5'-P-CCNC: 54 U/L (ref 5–45)
ATRIAL RATE: 135 BPM
BACTERIA UR QL AUTO: ABNORMAL /HPF
BASOPHILS # BLD AUTO: 0.05 THOUSANDS/ΜL (ref 0–0.1)
BASOPHILS NFR BLD AUTO: 0 % (ref 0–1)
BILIRUB DIRECT SERPL-MCNC: 0.39 MG/DL (ref 0–0.2)
BILIRUB SERPL-MCNC: 0.87 MG/DL (ref 0.2–1)
BILIRUB SERPL-MCNC: 0.89 MG/DL (ref 0.2–1)
BILIRUB UR QL STRIP: ABNORMAL
BUN SERPL-MCNC: 23 MG/DL (ref 5–25)
CALCIUM SERPL-MCNC: 9 MG/DL (ref 8.3–10.1)
CHLORIDE SERPL-SCNC: 98 MMOL/L (ref 100–108)
CLARITY UR: ABNORMAL
CO2 SERPL-SCNC: 24 MMOL/L (ref 21–32)
COLOR UR: YELLOW
CREAT SERPL-MCNC: 0.86 MG/DL (ref 0.6–1.3)
EOSINOPHIL # BLD AUTO: 0 THOUSAND/ΜL (ref 0–0.61)
EOSINOPHIL NFR BLD AUTO: 0 % (ref 0–6)
ERYTHROCYTE [DISTWIDTH] IN BLOOD BY AUTOMATED COUNT: 18.7 % (ref 11.6–15.1)
GFR SERPL CREATININE-BSD FRML MDRD: 96 ML/MIN/1.73SQ M
GLUCOSE SERPL-MCNC: 106 MG/DL (ref 65–140)
GLUCOSE SERPL-MCNC: 82 MG/DL (ref 65–140)
GLUCOSE UR STRIP-MCNC: NEGATIVE MG/DL
HCT VFR BLD AUTO: 35.5 % (ref 36.5–49.3)
HGB BLD-MCNC: 10.7 G/DL (ref 12–17)
HGB UR QL STRIP.AUTO: ABNORMAL
IMM GRANULOCYTES # BLD AUTO: 0.1 THOUSAND/UL (ref 0–0.2)
IMM GRANULOCYTES NFR BLD AUTO: 1 % (ref 0–2)
KETONES UR STRIP-MCNC: NEGATIVE MG/DL
LACTATE SERPL-SCNC: 0.9 MMOL/L (ref 0.5–2)
LEUKOCYTE ESTERASE UR QL STRIP: ABNORMAL
LIPASE SERPL-CCNC: 36 U/L (ref 73–393)
LYMPHOCYTES # BLD AUTO: 1.15 THOUSANDS/ΜL (ref 0.6–4.47)
LYMPHOCYTES NFR BLD AUTO: 7 % (ref 14–44)
MCH RBC QN AUTO: 23.7 PG (ref 26.8–34.3)
MCHC RBC AUTO-ENTMCNC: 30.1 G/DL (ref 31.4–37.4)
MCV RBC AUTO: 79 FL (ref 82–98)
MONOCYTES # BLD AUTO: 0.51 THOUSAND/ΜL (ref 0.17–1.22)
MONOCYTES NFR BLD AUTO: 3 % (ref 4–12)
NEUTROPHILS # BLD AUTO: 14.58 THOUSANDS/ΜL (ref 1.85–7.62)
NEUTS SEG NFR BLD AUTO: 89 % (ref 43–75)
NITRITE UR QL STRIP: POSITIVE
NON-SQ EPI CELLS URNS QL MICRO: ABNORMAL /HPF
NRBC BLD AUTO-RTO: 0 /100 WBCS
OTHER STN SPEC: ABNORMAL
P AXIS: 74 DEGREES
PH UR STRIP.AUTO: 5.5 [PH]
PLATELET # BLD AUTO: 479 THOUSANDS/UL (ref 149–390)
PMV BLD AUTO: 10.1 FL (ref 8.9–12.7)
POTASSIUM SERPL-SCNC: 3.5 MMOL/L (ref 3.5–5.3)
PR INTERVAL: 132 MS
PROT SERPL-MCNC: 8.5 G/DL (ref 6.4–8.2)
PROT SERPL-MCNC: 8.5 G/DL (ref 6.4–8.2)
PROT UR STRIP-MCNC: ABNORMAL MG/DL
QRS AXIS: 42 DEGREES
QRSD INTERVAL: 74 MS
QT INTERVAL: 274 MS
QTC INTERVAL: 411 MS
RBC # BLD AUTO: 4.51 MILLION/UL (ref 3.88–5.62)
RBC #/AREA URNS AUTO: ABNORMAL /HPF
SODIUM SERPL-SCNC: 132 MMOL/L (ref 136–145)
SP GR UR STRIP.AUTO: 1.02 (ref 1–1.03)
T WAVE AXIS: 47 DEGREES
TROPONIN I SERPL-MCNC: <0.02 NG/ML
UROBILINOGEN UR QL STRIP.AUTO: 1 E.U./DL
VENTRICULAR RATE: 135 BPM
WBC # BLD AUTO: 16.39 THOUSAND/UL (ref 4.31–10.16)
WBC #/AREA URNS AUTO: ABNORMAL /HPF

## 2019-10-28 PROCEDURE — 74177 CT ABD & PELVIS W/CONTRAST: CPT

## 2019-10-28 PROCEDURE — G0008 ADMIN INFLUENZA VIRUS VAC: HCPCS | Performed by: SURGERY

## 2019-10-28 PROCEDURE — 83605 ASSAY OF LACTIC ACID: CPT | Performed by: EMERGENCY MEDICINE

## 2019-10-28 PROCEDURE — 96361 HYDRATE IV INFUSION ADD-ON: CPT

## 2019-10-28 PROCEDURE — 93005 ELECTROCARDIOGRAM TRACING: CPT

## 2019-10-28 PROCEDURE — 81001 URINALYSIS AUTO W/SCOPE: CPT | Performed by: EMERGENCY MEDICINE

## 2019-10-28 PROCEDURE — 99285 EMERGENCY DEPT VISIT HI MDM: CPT | Performed by: EMERGENCY MEDICINE

## 2019-10-28 PROCEDURE — 99285 EMERGENCY DEPT VISIT HI MDM: CPT

## 2019-10-28 PROCEDURE — 96374 THER/PROPH/DIAG INJ IV PUSH: CPT

## 2019-10-28 PROCEDURE — 82948 REAGENT STRIP/BLOOD GLUCOSE: CPT

## 2019-10-28 PROCEDURE — 87077 CULTURE AEROBIC IDENTIFY: CPT | Performed by: EMERGENCY MEDICINE

## 2019-10-28 PROCEDURE — 99223 1ST HOSP IP/OBS HIGH 75: CPT | Performed by: INTERNAL MEDICINE

## 2019-10-28 PROCEDURE — 84484 ASSAY OF TROPONIN QUANT: CPT | Performed by: EMERGENCY MEDICINE

## 2019-10-28 PROCEDURE — 87186 SC STD MICRODIL/AGAR DIL: CPT | Performed by: EMERGENCY MEDICINE

## 2019-10-28 PROCEDURE — 87086 URINE CULTURE/COLONY COUNT: CPT | Performed by: EMERGENCY MEDICINE

## 2019-10-28 PROCEDURE — 80053 COMPREHEN METABOLIC PANEL: CPT | Performed by: EMERGENCY MEDICINE

## 2019-10-28 PROCEDURE — 96365 THER/PROPH/DIAG IV INF INIT: CPT

## 2019-10-28 PROCEDURE — 80076 HEPATIC FUNCTION PANEL: CPT | Performed by: EMERGENCY MEDICINE

## 2019-10-28 PROCEDURE — 36415 COLL VENOUS BLD VENIPUNCTURE: CPT | Performed by: EMERGENCY MEDICINE

## 2019-10-28 PROCEDURE — 90682 RIV4 VACC RECOMBINANT DNA IM: CPT | Performed by: SURGERY

## 2019-10-28 PROCEDURE — 99221 1ST HOSP IP/OBS SF/LOW 40: CPT | Performed by: SURGERY

## 2019-10-28 PROCEDURE — 85025 COMPLETE CBC W/AUTO DIFF WBC: CPT | Performed by: EMERGENCY MEDICINE

## 2019-10-28 PROCEDURE — 96375 TX/PRO/DX INJ NEW DRUG ADDON: CPT

## 2019-10-28 PROCEDURE — 87040 BLOOD CULTURE FOR BACTERIA: CPT | Performed by: EMERGENCY MEDICINE

## 2019-10-28 PROCEDURE — 87181 SC STD AGAR DILUTION PER AGT: CPT | Performed by: EMERGENCY MEDICINE

## 2019-10-28 PROCEDURE — 71045 X-RAY EXAM CHEST 1 VIEW: CPT

## 2019-10-28 PROCEDURE — 83690 ASSAY OF LIPASE: CPT | Performed by: EMERGENCY MEDICINE

## 2019-10-28 PROCEDURE — 99221 1ST HOSP IP/OBS SF/LOW 40: CPT | Performed by: INTERNAL MEDICINE

## 2019-10-28 PROCEDURE — 93010 ELECTROCARDIOGRAM REPORT: CPT

## 2019-10-28 RX ORDER — ACETAMINOPHEN 325 MG/1
975 TABLET ORAL ONCE
Status: COMPLETED | OUTPATIENT
Start: 2019-10-28 | End: 2019-10-28

## 2019-10-28 RX ORDER — DOPAMINE HYDROCHLORIDE 160 MG/100ML
1-20 INJECTION, SOLUTION INTRAVENOUS CONTINUOUS
Status: DISCONTINUED | OUTPATIENT
Start: 2019-10-28 | End: 2019-10-28

## 2019-10-28 RX ORDER — HYDROMORPHONE HCL/PF 1 MG/ML
0.5 SYRINGE (ML) INJECTION EVERY 4 HOURS PRN
Status: DISCONTINUED | OUTPATIENT
Start: 2019-10-28 | End: 2019-10-29

## 2019-10-28 RX ORDER — ACETAMINOPHEN 325 MG/1
650 TABLET ORAL EVERY 6 HOURS PRN
Status: DISCONTINUED | OUTPATIENT
Start: 2019-10-28 | End: 2019-11-05 | Stop reason: HOSPADM

## 2019-10-28 RX ORDER — ONDANSETRON 2 MG/ML
4 INJECTION INTRAMUSCULAR; INTRAVENOUS EVERY 6 HOURS PRN
Status: DISCONTINUED | OUTPATIENT
Start: 2019-10-28 | End: 2019-11-05 | Stop reason: HOSPADM

## 2019-10-28 RX ORDER — MORPHINE SULFATE 4 MG/ML
4 INJECTION, SOLUTION INTRAMUSCULAR; INTRAVENOUS ONCE
Status: COMPLETED | OUTPATIENT
Start: 2019-10-28 | End: 2019-10-28

## 2019-10-28 RX ORDER — KETOROLAC TROMETHAMINE 30 MG/ML
15 INJECTION, SOLUTION INTRAMUSCULAR; INTRAVENOUS ONCE
Status: COMPLETED | OUTPATIENT
Start: 2019-10-28 | End: 2019-10-28

## 2019-10-28 RX ORDER — HEPARIN SODIUM 5000 [USP'U]/ML
5000 INJECTION, SOLUTION INTRAVENOUS; SUBCUTANEOUS EVERY 8 HOURS SCHEDULED
Status: DISPENSED | OUTPATIENT
Start: 2019-10-28 | End: 2019-10-31

## 2019-10-28 RX ORDER — ONDANSETRON 2 MG/ML
4 INJECTION INTRAMUSCULAR; INTRAVENOUS ONCE
Status: COMPLETED | OUTPATIENT
Start: 2019-10-28 | End: 2019-10-28

## 2019-10-28 RX ORDER — SODIUM CHLORIDE, SODIUM LACTATE, POTASSIUM CHLORIDE, CALCIUM CHLORIDE 600; 310; 30; 20 MG/100ML; MG/100ML; MG/100ML; MG/100ML
75 INJECTION, SOLUTION INTRAVENOUS CONTINUOUS
Status: DISCONTINUED | OUTPATIENT
Start: 2019-10-28 | End: 2019-11-02

## 2019-10-28 RX ADMIN — SODIUM CHLORIDE 1000 ML: 0.9 INJECTION, SOLUTION INTRAVENOUS at 06:10

## 2019-10-28 RX ADMIN — HEPARIN SODIUM 5000 UNITS: 5000 INJECTION INTRAVENOUS; SUBCUTANEOUS at 21:41

## 2019-10-28 RX ADMIN — MORPHINE SULFATE 4 MG: 4 INJECTION INTRAVENOUS at 04:09

## 2019-10-28 RX ADMIN — MORPHINE SULFATE 2 MG: 2 INJECTION, SOLUTION INTRAMUSCULAR; INTRAVENOUS at 17:11

## 2019-10-28 RX ADMIN — SODIUM CHLORIDE, SODIUM LACTATE, POTASSIUM CHLORIDE, AND CALCIUM CHLORIDE 125 ML/HR: .6; .31; .03; .02 INJECTION, SOLUTION INTRAVENOUS at 11:11

## 2019-10-28 RX ADMIN — IOHEXOL 100 ML: 350 INJECTION, SOLUTION INTRAVENOUS at 07:47

## 2019-10-28 RX ADMIN — SODIUM CHLORIDE, SODIUM LACTATE, POTASSIUM CHLORIDE, AND CALCIUM CHLORIDE 125 ML/HR: .6; .31; .03; .02 INJECTION, SOLUTION INTRAVENOUS at 19:53

## 2019-10-28 RX ADMIN — SODIUM CHLORIDE 1000 ML: 0.9 INJECTION, SOLUTION INTRAVENOUS at 04:50

## 2019-10-28 RX ADMIN — ACETAMINOPHEN 975 MG: 325 TABLET ORAL at 04:09

## 2019-10-28 RX ADMIN — VANCOMYCIN HYDROCHLORIDE 125 MG: 500 INJECTION, POWDER, LYOPHILIZED, FOR SOLUTION INTRAVENOUS at 15:33

## 2019-10-28 RX ADMIN — PIPERACILLIN SODIUM,TAZOBACTAM SODIUM 3.38 G: 3; .375 INJECTION, POWDER, FOR SOLUTION INTRAVENOUS at 16:58

## 2019-10-28 RX ADMIN — ONDANSETRON 4 MG: 2 INJECTION INTRAMUSCULAR; INTRAVENOUS at 04:09

## 2019-10-28 RX ADMIN — MORPHINE SULFATE 2 MG: 2 INJECTION, SOLUTION INTRAMUSCULAR; INTRAVENOUS at 23:16

## 2019-10-28 RX ADMIN — SODIUM CHLORIDE 1100 ML: 0.9 INJECTION, SOLUTION INTRAVENOUS at 04:50

## 2019-10-28 RX ADMIN — PIPERACILLIN SODIUM,TAZOBACTAM SODIUM 3.38 G: 3; .375 INJECTION, POWDER, FOR SOLUTION INTRAVENOUS at 05:15

## 2019-10-28 RX ADMIN — KETOROLAC TROMETHAMINE 15 MG: 30 INJECTION, SOLUTION INTRAMUSCULAR at 05:14

## 2019-10-28 RX ADMIN — PIPERACILLIN SODIUM,TAZOBACTAM SODIUM 3.38 G: 3; .375 INJECTION, POWDER, FOR SOLUTION INTRAVENOUS at 21:41

## 2019-10-28 RX ADMIN — INFLUENZA A VIRUS A/BRISBANE/02/2018 (H1N1) RECOMBINANT HEMAGGLUTININ ANTIGEN, INFLUENZA A VIRUS A/KANSAS/14/2017 (H3N2) RECOMBINANT HEMAGGLUTININ ANTIGEN, INFLUENZA B VIRUS B/PHUKET/3073/2013 RECOMBINANT HEMAGGLUTININ ANTIGEN, AND INFLUENZA B VIRUS B/MARYLAND/15/2016 RECOMBINANT HEMAGGLUTININ ANTIGEN 0.5 ML: 45; 45; 45; 45 INJECTION INTRAMUSCULAR at 11:11

## 2019-10-28 RX ADMIN — ACETAMINOPHEN 650 MG: 325 TABLET ORAL at 23:38

## 2019-10-28 RX ADMIN — HYDROMORPHONE HYDROCHLORIDE 0.5 MG: 1 INJECTION, SOLUTION INTRAMUSCULAR; INTRAVENOUS; SUBCUTANEOUS at 13:47

## 2019-10-28 RX ADMIN — HYDROMORPHONE HYDROCHLORIDE 0.5 MG: 1 INJECTION, SOLUTION INTRAMUSCULAR; INTRAVENOUS; SUBCUTANEOUS at 20:05

## 2019-10-28 RX ADMIN — VANCOMYCIN HYDROCHLORIDE 125 MG: 500 INJECTION, POWDER, LYOPHILIZED, FOR SOLUTION INTRAVENOUS at 21:41

## 2019-10-28 NOTE — SEPSIS NOTE
Sepsis Note   Ethyl Fuelling Moscat 63 y o  male MRN: [id number]  Unit/Bed#: ED 16 Encounter: [id number]      qSOFA     Row Name 10/28/19 0452 10/28/19 0341             Altered mental status GCS < 15           Respiratory Rate > / =22  0  0       Systolic BP < / =366  1  1       Q Sofa Score  1  1           Initial Sepsis Screening     Row Name 10/28/19 0458 10/28/19 0457 10/28/19 0400          Is the patient's history suggestive of a new or worsening infection?     (!) Yes (Proceed)  -ML      Suspected source of infection      urinary tract infection  -ML      Are two or more of the following signs & symptoms of infection both present and new to the patient?     (!) Yes (Proceed)  -ML      Indicate SIRS criteria  Tachypnea > 20 resp per min;Leukocytosis (WBC > 13428 IJL)  -ML  Leukocytosis (WBC > 44331 IJL); Tachycardia > 90 bpm  -ML  Tachycardia > 90 bpm  -ML      If the answer is yes to both questions, suspicion of sepsis is present            If severe sepsis is present AND tissue hypoperfusion perists in the hour after fluid resuscitation or lactate > 4, the patient meets criteria for SEPTIC SHOCK            Are any of the following organ dysfunction criteria present within 6 hours of suspected infection and SIRS criteria that are NOT considered to be chronic conditions?   (!) Yes  -ML        Organ dysfunction  SBP decrease > 40 mmHg from baseline;SBP < 90 mmHg  -ML          Date of presentation of severe sepsis            Time of presentation of severe sepsis            Tissue hypoperfusion persists in the hour after crystalloid fluid administration, evidenced, by either:  SBP < 90 mm/Hg ( ___ mm/Hg in comment field)  -ML          Was hypotension present within one hour of the conclusion of crystalloid fluid administration?   Yes  -ML          Date of presentation of septic shock            Time of presentation of septic shock              User Key  (r) = Recorded By, (t) = Taken By, (c) = Cosigned By    [address] Name Provider Fernando Sever, MD Physician

## 2019-10-28 NOTE — ASSESSMENT & PLAN NOTE
Lab Results   Component Value Date    HGBA1C 5 2 05/06/2019       Recent Labs     10/28/19  1350   POCGLU 82       Blood Sugar Average: Last 72 hrs:  (P) 82     Check blood glucose 4 times daily  Corrective insulin

## 2019-10-28 NOTE — ASSESSMENT & PLAN NOTE
Hemoglobin stable and looks at baseline  Iron studies previously done and showed probably iron-deficiency anemia  No report of EGD or colonoscopy in the past  Transfuse for hemoglobin less than 7

## 2019-10-28 NOTE — CONSULTS
Consult Note - Wound   Emma Hazel Moscat 63 y o  male MRN: [id number]  Unit/Bed#: [place] 2 -01 Encounter: [id number]      History and Present Illness:  63year old male presented to the hospital with no ostomy output since Saturday  CT scan consistent with bowel obstruction  Patient's history significant for right AKA, blindness of right eye, diverting colostomy,neurogenic bladder, suprapubic catheter, paraplegia, chronic osteomyelitis, penile prosthesis implant with chronic infection  Assessment Findings:   Patient seen per physician consult  Denies pain  Able to turn independently for assessment  Urinary catheter in place  Scar tissue to midline abdomen, left medial malleolus, left heel, left knee, left posterior thigh, and bilateral buttocks  Colostomy with pouching system intact  1   Present on admission stage 4 pressure injury to left distal buttock/ischium--wound bed clean with 100% pink tissue  Tunneling and undermining present  2   Present on admission traumatic wound with dry black eschar to left medial knee  3   Present on admission traumatic wound to left posterior thigh  4   Present on admission traumatic wound vs stage 2 pressure injury to left mid buttock--wound oval over area of scar tissue  5   Present on admission stage 2 pressure injury to right distal buttock  See flowsheet and media for wound details  Plan:   1-Hydraguard lotion to left heel BID and PRN  2-Elevate left heel to offload pressure  3-EHOB offloading cushion in chair when out of bed  4-Moisturize skin daily with skin nourishing cream   5-Turn/reposition q2h or when medically stable for pressure re-distribution on skin--use positioning wedges  6-P500 low air-loss mattress  7-Left distal buttock/ischial wound--cleanse with normal saline  3M Cavilon skin barrier film to gaby-wound skin  Moisten jagjit packing with Silvasorb gel (smear gel onto jagjit) and pack wound loosely    Cover with 4x4 and ABD   Secure with tape  8-Apply Allevyn Life foam dressings to left knee, buttock, and posterior thigh wounds  Lupillo with T   Change dressing every 3 days and PRN  9-Wound care team to follow  Plan of care reviewed with primary RN  Discussed wound dressing options with surgical PA--we do not have AMD kerlix packing the patient is using as outpatient  Patient should be discharged with VNA and follow-up at wound center  Wound 08/26/19 Pressure Injury Buttocks Left;Distal;Lateral (Active)   Wound Image   10/28/2019 11:04 AM   Wound Description Pink 10/28/2019 11:04 AM   Staging Stage IV 10/28/2019 11:04 AM   Shelby-wound Assessment Erythema; Intact 10/28/2019 11:04 AM   Wound Length (cm) 6 cm 10/28/2019 11:04 AM   Wound Width (cm) 3 cm 10/28/2019 11:04 AM   Wound Depth (cm) 2 2 10/28/2019 11:04 AM   Calculated Wound Area (cm^2) 18 cm^2 10/28/2019 11:04 AM   Calculated Wound Volume (cm^3) 39 6 cm^3 10/28/2019 11:04 AM   Change in Wound Size % -[id number] 10/28/2019 11:04 AM   Tunneling in depth located at 6 cm @ 3 oclock & 7 5 cm @ 12 oclock 10/28/2019 11:04 AM   Undermining is depth extending from 8-4 oclock 10/28/2019 11:04 AM   Drainage Amount Small 10/28/2019 11:04 AM   Drainage Description Serosanguineous 10/28/2019 11:04 AM   Non-staged Wound Description Full thickness 10/28/2019 11:04 AM   Treatments Cleansed;Irrigation with NSS;Site care 10/28/2019 11:04 AM   Dressing Packings;Dry dressing 10/28/2019 11:04 AM   Wound packed? Yes 10/28/2019 11:04 AM   Packing- # inserted 1 10/28/2019 11:04 AM   Dressing Changed Changed 10/28/2019 11:04 AM   Patient Tolerance Tolerated well 10/28/2019 11:04 AM   Dressing Status Clean;Dry; Intact 10/28/2019 11:04 AM       Wound 09/16/19 Pressure Injury Buttocks Right;Distal (Active)   Wound Image   10/28/2019 10:58 AM   Wound Description Pink 10/28/2019 10:58 AM   Staging Stage II 10/28/2019 10:58 AM   Shelby-wound Assessment Erythema; Intact 10/28/2019 10:58 AM   Wound Length (cm) 0 6 cm 10/28/2019 10:58 AM   Wound Width (cm) 1 cm 10/28/2019 10:58 AM   Wound Depth (cm) 0 1 10/28/2019 10:58 AM   Calculated Wound Area (cm^2) 0 6 cm^2 10/28/2019 10:58 AM   Calculated Wound Volume (cm^3) 0 06 cm^3 10/28/2019 10:58 AM   Change in Wound Size % 80 10/28/2019 10:58 AM   Drainage Amount Scant 10/28/2019 10:58 AM   Drainage Description Serosanguineous 10/28/2019 10:58 AM   Non-staged Wound Description Partial thickness 10/28/2019 10:58 AM   Treatments Cleansed 10/28/2019 10:58 AM   Dressing Protective barrier 10/28/2019 10:58 AM   Dressing Changed New 10/28/2019 10:58 AM   Patient Tolerance Tolerated well 10/28/2019 10:58 AM       Wound 10/28/19 Traumatic Knee Left (Active)   Wound Image   10/28/2019 11:18 AM   Wound Description Dry;Black 10/28/2019 11:18 AM   Shelby-wound Assessment Pink; Intact 10/28/2019 11:18 AM   Wound Length (cm) 1 5 cm 10/28/2019 11:18 AM   Wound Width (cm) 1 5 cm 10/28/2019 11:18 AM   Wound Depth (cm) 0 10/28/2019 11:18 AM   Calculated Wound Area (cm^2) 2 25 cm^2 10/28/2019 11:18 AM   Calculated Wound Volume (cm^3) 0 cm^3 10/28/2019 11:18 AM   Drainage Amount None 10/28/2019 11:18 AM   Non-staged Wound Description Not applicable 78/94/1338 52:06 AM   Treatments Cleansed 10/28/2019 11:18 AM   Dressing Foam, Silicon (eg  Allevyn, etc) 10/28/2019 11:18 AM   Dressing Changed New 10/28/2019 11:18 AM   Patient Tolerance Tolerated well 10/28/2019 11:18 AM   Dressing Status Clean;Dry; Intact 10/28/2019 11:18 AM       Wound 10/28/19 Pressure Injury Buttocks Left;Mid (Active)   Wound Image   10/28/2019 11:09 AM   Wound Description Beefy red 10/28/2019 11:09 AM   Staging Stage II 10/28/2019 11:09 AM   Shelby-wound Assessment Pink; Intact 10/28/2019 11:09 AM   Wound Length (cm) 0 5 cm 10/28/2019 11:09 AM   Wound Width (cm) 2 5 cm 10/28/2019 11:09 AM   Wound Depth (cm) 0 1 10/28/2019 11:09 AM   Calculated Wound Area (cm^2) 1 25 cm^2 10/28/2019 11:09 AM   Calculated Wound Volume (cm^3) 0 13 cm^3 10/28/2019 11:09 AM   Drainage Amount Scant 10/28/2019 11:09 AM   Drainage Description Serosanguineous 10/28/2019 11:09 AM   Non-staged Wound Description Partial thickness 10/28/2019 11:09 AM   Treatments Cleansed 10/28/2019 11:09 AM   Dressing Foam, Silicon (eg  Allevyn, etc) 10/28/2019 11:09 AM   Dressing Changed New 10/28/2019 11:09 AM   Patient Tolerance Tolerated well 10/28/2019 11:09 AM   Dressing Status Clean; Intact;Dry 10/28/2019 11:09 AM       Wound 10/28/19 Traumatic Thigh Left;Posterior (Active)   Wound Image   10/28/2019 11:10 AM   Wound Description Beefy red 10/28/2019 11:10 AM   Shelby-wound Assessment Clean;Dry; Intact 10/28/2019 11:10 AM   Wound Length (cm) 0 5 cm 10/28/2019 11:10 AM   Wound Width (cm) 1 5 cm 10/28/2019 11:10 AM   Wound Depth (cm) 0 1 10/28/2019 11:10 AM   Calculated Wound Area (cm^2) 0 75 cm^2 10/28/2019 11:10 AM   Calculated Wound Volume (cm^3) 0 08 cm^3 10/28/2019 11:10 AM   Drainage Amount Scant 10/28/2019 11:10 AM   Drainage Description Bloody 10/28/2019 11:10 AM   Non-staged Wound Description Partial thickness 10/28/2019 11:10 AM   Treatments Cleansed 10/28/2019 11:10 AM   Dressing Foam, Silicon (eg  Allevyn, etc) 10/28/2019 11:10 AM   Dressing Changed New 10/28/2019 11:10 AM   Patient Tolerance Tolerated well 10/28/2019 11:10 AM   Dressing Status Clean;Dry; Intact 10/28/2019 11:10 AM     Jean Claude Corbin BSN, RN, [facility]

## 2019-10-28 NOTE — ED NOTES
Flushed suprapubic catheter  No resistance noted  Urine returned          Cory Guerrero RN  10/28/19 6266

## 2019-10-28 NOTE — ED PROVIDER NOTES
History  Chief Complaint   Patient presents with    Fever - 9 weeks to 74 years     Patient presents via EMS complaining of fevers, abdominal pain, "wound to left knee", "dark colored urine"  Symptoms since Saturday  Pt  With a h/o paraplegic spinal paralysis (no feeling after T5/T6 , for >20years from a spinal cyst)  presents with a  fever that started today  He also has some epigastric abd  Pain that started yest   Pt   Has a chronic indwelling dangelo and the urine appears darker than usual   He is not eating or drinking much in the past day  +nausea, no vomiting or diarrhea  Prior to Admission Medications   Prescriptions Last Dose Informant Patient Reported? Taking?    ACCU-CHEK FASTCLIX LANCETS MISC   No No   Sig: USE TWICE DAILY   ACCU-CHEK SMARTVIEW test strip  Self No No   Sig: TEST TWICE DAILY   ASPIRIN LOW DOSE 81 MG EC tablet Past Week at Unknown time Self No Yes   Sig: TAKE 1 TABLET BY MOUTH EVERY DAY   Disposable Gloves (LATEX GLOVES LARGE) MISC  Self No No   Sig: Use as needed up to 3 times a day dispo 2 boxes   Incontinence Supply Disposable (SUNBEAM INCONTINENT UNDER PAD) MISC  Self No No   Sig: Use 1 per week, dispense 4 reusable underpads   Incontinence Supply Disposable MISC  Self No No   Sig: by Does not apply route 2 (two) times a day   acetaminophen (TYLENOL) 325 mg tablet Not Taking at Unknown time Self No No   Sig: Take 2 tablets (650 mg total) by mouth every 6 (six) hours as needed for mild pain, headaches or fever   Patient not taking: Reported on 10/28/2019   ascorbic acid (VITAMIN C) 500 MG tablet Not Taking at Unknown time Self No No   Sig: Take 1 tablet (500 mg total) by mouth daily with breakfast   Patient not taking: Reported on 10/28/2019   enoxaparin (LOVENOX) 40 mg/0 4 mL Not Taking at Unknown time Self No No   Sig: Inject 0 4 mL (40 mg total) under the skin daily   Patient not taking: Reported on 58/07/3900   folic acid (FOLVITE) 1 mg tablet Not Taking at Unknown time Self No No   Sig: Take 0 5 tablets (500 mcg total) by mouth daily   Patient not taking: Reported on 10/28/2019   omeprazole (PriLOSEC) 20 mg delayed release capsule Not Taking at Unknown time Self No No   Sig: TAKE 1 CAPSULE(20 MG) BY MOUTH DAILY AT BEDTIME   Patient not taking: Reported on 10/28/2019   oxyCODONE (OxyCONTIN) 20 mg 12 hr tablet Past Week at Unknown time  No Yes   Sig: Take 1 tablet (20 mg total) by mouth every 12 (twelve) hoursMax Daily Amount: 40 mg   saccharomyces boulardii (FLORASTOR) 250 mg capsule Not Taking at Unknown time Self No No   Sig: Take 1 capsule (250 mg total) by mouth 2 (two) times a day   Patient not taking: Reported on 10/28/2019      Facility-Administered Medications: None       Past Medical History:   Diagnosis Date    Anemia     Blind     r eye    Chronic cystitis     Col[facility] in place [facility])     Detrusor sphincter dyssynergia     Diabetes mellitus ([address] )     Poorly controlled type 2; Last Assessed:  3/18/14    Erectile dysfunction     Frequency of urination     GERD (gastroesophageal reflux disease)     History of diabetes mellitus     History of osteomyelitis     Hx of leg amputation (HCC)     r high upper leg    Hyperlipidemia     Hypertension     Hypospadias     Incomplete bladder emptying     Neurogenic bladder     Paralysis ([address] )     Paraplegia ([address] )     Spinal cord cysts     Ulcer of sacral region ([address] )     Urge incontinence        Past Surgical History:   Procedure Laterality Date    AMPUTATION      At hip; Last Assessed:  1/19/16    BLADDER SURGERY      COLON SURGERY      llq ostomy pouch    COLOSTOMY      COMPLEX CYSTOMETROGRAM  2014    CT CYSTOGRAM  9/19/2019    CYSTOSCOPY  2014    IR PICC REPO  9/23/2019    IR SUPRAPUBIC TUBE  9/19/2019    LEG AMPUTATION      MEATOTOMY      PENILE PROSTHESIS  REMOVAL N/A 11/1/2019    Procedure: REMOVAL PROSTHESIS PENILE;  Surgeon: Calista Desai MD;  Location: [facility];  Service: Urology  PENILE PROSTHESIS IMPLANT      MN ADJ TISS XFER SCALP,EXTREM 10 1-30 SQCM Left 2017    Procedure: POSTERIOR THIGH V-Y ADMANCEMENT;  Surgeon: Francesca Ocampo MD;  [facility];  Service: Plastics    MN MUSCLE-SKIN FLAP,TRUNK Left 2017    Procedure: FLAP CLOSURE LEFT ISCHIAL WOUND and "RIGHT" ISCHIAL FLAP ADVANCEMENT * DEBRIDEMENT, VAC PLACEMENT ;  Surgeon: Francesca Ocampo MD;  [facility];  Service: Plastics    MN MUSCLE-SKIN FLAP,TRUNK Left 2017    Procedure: gluteal myocutaneous rotational flap, posterior thigh v to y advancement- wound 5 x 2 5 x 8;  Surgeon: Francesca Ocampo MD;  [facility];  Service: Plastics    SPINE SURGERY      Lower back    UROFLOWMETRY SIMPLE / COMPLEX         Family History   Problem Relation Age of Onset    No Known Problems Mother     No Known Problems Father      I have reviewed and agree with the history as documented  Social History     Tobacco Use    Smoking status: Former Smoker     Packs/day: 0 50     Years: 10 00     Pack years: 5 00     Last attempt to quit:      Years since quittin 8    Smokeless tobacco: Never Used    Tobacco comment: Onset date:  11/10/17   Substance Use Topics    Alcohol use: Never     Frequency: Never     Comment: Per [employer]:  Social drinker (Onset date:  11/10/17)    Drug use: No        Review of Systems   Constitutional: Negative for appetite change, fatigue and fever  HENT: Negative for rhinorrhea and sore throat  Respiratory: Negative for cough, shortness of breath and wheezing  Cardiovascular: Negative for chest pain and leg swelling  Gastrointestinal: Positive for abdominal pain and nausea  Negative for diarrhea and vomiting  Genitourinary: Negative for flank pain  Musculoskeletal: Negative for back pain and neck pain  Skin: Negative for rash  Neurological: Negative for syncope and headaches     Psychiatric/Behavioral:        Mood normal       Physical Exam  Physical Exam   Constitutional: He is oriented to person, place, and time  He appears well-developed and well-nourished  HENT:   Head: Normocephalic and atraumatic  Neck: Normal range of motion  Neck supple  Cardiovascular: Normal rate and regular rhythm  Pulmonary/Chest: Effort normal and breath sounds normal  No respiratory distress  Abdominal: Soft  There is no tenderness  Epigastric abd  Tenderness, no r/g   Musculoskeletal: Normal range of motion  Neurological: He is alert and oriented to person, place, and time  Skin: Skin is warm and dry  Nursing note and vitals reviewed        Vital Signs  ED Triage Vitals   Temperature Pulse Respirations Blood Pressure SpO2   10/28/19 0343 10/28/19 0341 10/28/19 0341 10/28/19 0341 10/28/19 0341   100 2 °F (37 9 °C) (!) 137 20 100/67 95 %      Temp Source Heart Rate Source Patient Position - Orthostatic VS BP Location FiO2 (%)   10/28/19 0343 10/28/19 0341 10/28/19 0341 10/28/19 0341 --   Oral Monitor Lying Right arm       Pain Score       10/28/19 0341       Worst Possible Pain           Vitals:    11/04/19 0709 11/04/19 1536 11/04/19 2317 11/05/19 0749   BP: 125/89 112/77 111/80 134/84   Pulse: 75 85 99 67   Patient Position - Orthostatic VS:             Visual Acuity      ED Medications  Medications   heparin (porcine) subcutaneous injection 5,000 Units (5,000 Units Subcutaneous Given 10/31/19 2205)   sodium chloride 0 9 % bolus 1,000 mL (0 mL Intravenous Stopped 10/28/19 0512)   ondansetron (ZOFRAN) injection 4 mg (4 mg Intravenous Given 10/28/19 0409)   morphine (PF) 4 mg/mL injection 4 mg (4 mg Intravenous Given 10/28/19 0409)   acetaminophen (TYLENOL) tablet 975 mg (975 mg Oral Given 10/28/19 0409)   sodium chloride 0 9 % bolus 1,100 mL (0 mL Intravenous Stopped 10/28/19 0529)   piperacillin-tazobactam (ZOSYN) 3 375 g in sodium chloride 0 9 % 50 mL IVPB (0 g Intravenous Stopped 10/28/19 0556)   ketorolac (TORADOL) injection 15 mg (15 mg Intravenous Given 10/28/19 0514)   sodium chloride 0 9 % bolus 1,000 mL (0 mL Intravenous Stopped 10/28/19 0735)   iohexol (OMNIPAQUE) 350 MG/ML injection (SINGLE-DOSE) 100 mL (100 mL Intravenous Given 10/28/19 0747)   influenza vaccine, recombinant, quadrivalent (FLUBLOK) IM injection 0 5 mL (0 5 mL Intramuscular Given 10/28/19 1111)   potassium chloride 20 mEq IVPB (premix) (0 mEq Intravenous Stopped 10/29/19 1752)   acetaminophen (TYLENOL) tablet 325 mg (325 mg Oral Given 10/30/19 0323)   potassium chloride 20 mEq IVPB (premix) (20 mEq Intravenous New Bag 10/30/19 1330)   potassium chloride (K-DUR,KLOR-CON) CR tablet 40 mEq (40 mEq Oral Given 10/30/19 1012)   ertapenem (INVanz) 1,000 mg in sodium chloride 0 9 % 50 mL IVPB (0 mg Intravenous Stopped 11/5/19 1152)   potassium chloride (K-DUR,KLOR-CON) CR tablet 40 mEq (40 mEq Oral Given 11/2/19 1224)       Diagnostic Studies  Results Reviewed     Procedure Component Value Units Date/Time    Blood culture #1 [[id number]] Collected:  10/28/19 0400    Lab Status:  Final result Specimen:  Blood from Hand, Left Updated:  11/02/19 0801     Blood Culture No Growth After 5 Days  Blood culture #2 [[id number]] Collected:  10/28/19 0406    Lab Status:  Final result Specimen:  Blood from Arm, Left Updated:  11/02/19 0801     Blood Culture No Growth After 5 Days      Urine culture [[id number]]  (Abnormal)  (Susceptibility) Collected:  10/28/19 0608    Lab Status:  Final result Specimen:  Urine, Suprapubic catheter Updated:  10/31/19 0843     Urine Culture >100,000 cfu/ml Escherichia coli ESBL      10,000-19,000 cfu/ml Pseudomonas aeruginosa      <10,000 cfu/ml Enterococcus species    Susceptibility     Escherichia coli ESBL (1)     Antibiotic Interpretation Microscan Method Status    ZID Performed  Yes  BALBINA Final    Amikacin ($$) Susceptible <=16 ug/ml BALBINA Final    Amoxicillin + Clavulanate Resistant >16/8 ug/ml BALBINA Final    Ampicillin ($$) Resistant >16 00 ug/ml BALBINA Final    Ampicillin + Sulbactam ($) Resistant >16/8 ug/ml BALBINA Final    Aztreonam ($$$)  Resistant >16 ug/ml BALBINA Final    Cefazolin ($) Resistant >16 00 ug/ml BALBINA Final    Cefepime ($) Resistant >16 00 ug/ml BALBINA Final    Cefotaxime ($) Resistant >32 00 ug/ml BALBINA Final    Ceftazidime ($$) Resistant >16 ug/ml BALBINA Final    Ceftriaxone ($$) Resistant >32 00 ug/ml BALBINA Final    Cefuroxime ($$) Resistant >16 ug/ml BALBINA Final    Ciprofloxacin ($)  Resistant >2 00 ug/ml BALBINA Final    Ertapenem ($$$) Susceptible <=0 5 ug/ml BALBINA Final    Gentamicin ($$) Susceptible 2 ug/ml BALBINA Final    Levofloxacin ($) Resistant >4 00 ug/ml BALBINA Final    Nitrofurantoin Susceptible <=32 ug/ml BALBINA Final    Piperacillin + Tazobactam ($$$) Intermediate 32 ug/ml BALBINA Final    Tetracycline Resistant >8 ug/ml BALBINA Final    Tobramycin ($) Resistant >8 ug/ml BALBINA Final    Trimethoprim + Sulfamethoxazole ($$$) Resistant >2/38 ug/ml BALBINA Final    Fosfomycin   Susceptible 0 500 ug/ml BALBINA Final          Pseudomonas aeruginosa (3)     Antibiotic Interpretation Microscan Method Status    ZID Performed  Yes  BALBINA Final    Aztreonam ($$$)  Resistant >16 ug/ml BALBINA Final    Cefepime ($) Susceptible 8 00 ug/ml BALBINA Final    Ceftazidime ($$) Susceptible 4 ug/ml BALBINA Final    Ciprofloxacin ($)  Susceptible <=1 00 ug/ml BALBINA Final    Gentamicin ($$) Susceptible 2 ug/ml BALBINA Final    Imipenem Susceptible 2 ug/ml BALBINA Final    Levofloxacin ($) Intermediate 4 00 ug/ml BALBINA Final    Meropenem ($$) Susceptible <=1 00 ug/ml BALBINA Final    Piperacillin + Tazobactam ($$$) Susceptible 16 ug/ml BALBINA Final    Tobramycin ($) Susceptible <=1 ug/ml BALBINA Final                   Comprehensive metabolic panel [[id number]]  (Abnormal) Collected:  10/28/19 0451    Lab Status:  Final result Specimen:  Blood from Arm, Left Updated:  10/28/19 0710     Sodium 132 mmol/L      Potassium 3 5 mmol/L      Chloride 98 mmol/L      CO2 24 mmol/L      ANION GAP 10 mmol/L      BUN 23 mg/dL      Creatinine 0 86 mg/dL      Glucose 106 mg/dL Calcium 9 0 mg/dL      AST 54 U/L      ALT 13 U/L      Alkaline Phosphatase 80 U/L      Total Protein 8 5 g/dL      Albumin 2 1 g/dL      Total Bilirubin 0 87 mg/dL      eGFR 96 ml/min/1 73sq m     Narrative:       [place] guidelines for Chronic Kidney Disease (CKD):     Stage 1 with normal or high GFR (GFR > 90 mL/min/1 73 square meters)    Stage 2 Mild CKD (GFR = 60-89 mL/min/1 73 square meters)    Stage 3A Moderate CKD (GFR = 45-59 mL/min/1 73 square meters)    Stage 3B Moderate CKD (GFR = 30-44 mL/min/1 73 square meters)    Stage 4 Severe CKD (GFR = 15-29 mL/min/1 73 square meters)    Stage 5 End Stage CKD (GFR <15 mL/min/1 73 square meters)  Note: GFR calculation is accurate only with a steady state creatinine    Urine Microscopic [[id number]]  (Abnormal) Collected:  10/28/19 0608    Lab Status:  Final result Specimen:  Urine, Suprapubic catheter Updated:  10/28/19 0703     RBC, UA None Seen /hpf      WBC, UA Innumerable /hpf      Epithelial Cells None Seen /hpf      Bacteria, UA Moderate /hpf      AMORPH URATES       Field obscured, unable to enumerate     /hpf     OTHER OBSERVATIONS WBCs Clumped    UA w Reflex to Microscopic w Reflex to Culture [[id number]]  (Abnormal) Collected:  10/28/19 0608    Lab Status:  Final result Specimen:  Urine, Suprapubic catheter Updated:  10/28/19 0702     Color, UA Yellow     Clarity, UA Cloudy     Specific Gravity, UA 1 025     pH, UA 5 5     Leukocytes, UA Moderate     Nitrite, UA Positive     Protein,  (2+) mg/dl      Glucose, UA Negative mg/dl      Ketones, UA Negative mg/dl      Urobilinogen, UA 1 0 E U /dl      Bilirubin, UA Small     Blood, UA Large    Hepatic function panel [[id number]]  (Abnormal) Collected:  10/28/19 0451    Lab Status:  Final result Specimen:  Blood from Arm, Left Updated:  10/28/19 0529     Total Bilirubin 0 89 mg/dL      Bilirubin, Direct 0 39 mg/dL      Alkaline Phosphatase 86 U/L      AST 51 U/L      ALT 14 U/L      Total Protein 8 5 g/dL      Albumin 2 1 g/dL     Lipase [[id number]]  (Abnormal) Collected:  10/28/19 0400    Lab Status:  Final result Specimen:  Blood from Hand, Left Updated:  10/28/19 0438     Lipase 36 u/L     Troponin I [[id number]]  (Normal) Collected:  10/28/19 0400    Lab Status:  Final result Specimen:  Blood from Hand, Left Updated:  10/28/19 0431     Troponin I <0 02 ng/mL     Lactic acid, plasma x2 [[id number]]  (Normal) Collected:  10/28/19 0400    Lab Status:  Final result Specimen:  Blood from Hand, Left Updated:  10/28/19 0431     LACTIC ACID 0 9 mmol/L     Narrative:       Result may be elevated if tourniquet was used during collection  CBC and differential [[id number]]  (Abnormal) Collected:  10/28/19 0400    Lab Status:  Final result Specimen:  Blood from Hand, Left Updated:  10/28/19 0413     WBC 16 39 Thousand/uL      RBC 4 51 Million/uL      Hemoglobin 10 7 g/dL      Hematocrit 35 5 %      MCV 79 fL      MCH 23 7 pg      MCHC 30 1 g/dL      RDW 18 7 %      MPV 10 1 fL      Platelets 024 Thousands/uL      nRBC 0 /100 WBCs      Neutrophils Relative 89 %      Immat GRANS % 1 %      Lymphocytes Relative 7 %      Monocytes Relative 3 %      Eosinophils Relative 0 %      Basophils Relative 0 %      Neutrophils Absolute 14 58 Thousands/µL      Immature Grans Absolute 0 10 Thousand/uL      Lymphocytes Absolute 1 15 Thousands/µL      Monocytes Absolute 0 51 Thousand/µL      Eosinophils Absolute 0 00 Thousand/µL      Basophils Absolute 0 05 Thousands/µL                  XR abdomen obstruction series   Final Result by Haim Vinson MD (10/29 1412)      There is still significant dilatation of small bowel loops, and retention of some of the contrast agent within these bowel loops  However, the presence of contrast within nondistended colon suggests the findings are related to either a high-grade    partial small bowel obstruction, or demonstrating early improvement of small bowel obstruction  There is no free air         Workstation performed: [id number]         CT abdomen pelvis with contrast   Final Result by Pleasant Goodpasture, DO (10/28 9292)      There is a Solomon catheter within a mostly decompressed bladder  Portions of the bladder wall didn't appear thickened  Correlate with urinalysis  Small amount of air within the bladder is nonspecific and may be iatrogenic  Markedly dilated proximal and mid jejunum with air-fluid levels consistent with bowel obstruction  There is an abrupt transition of jejunum within the left upper quadrant seen on series 2 images 51 through 57   Wall thickening within this portion of the    jejunum  The obstruction is of unclear etiology and may be related to adhesions  Mid to distal small bowel demonstrates mild fluid distention is mostly decompressed  Mild abdominal and pelvic ascites  2 discrete fluid collections are identified within the abdomen, one anteriorly on the right seen on series 2 image 51 and one within the right paramedian pelvis on image 69  Both of these are also well seen on coronal    imaging, images 37 and 75  They do not demonstrate peripheral wall enhancement at this time  There is a large decubitus ulcer identified within the left posterior perineum with chronic osteomyelitis of the underlying bony pelvis  The ulceration now results in partial exposure of the posterior aspect of the penile implant, a new finding compared    to the prior examination  The study was marked in [facility] for immediate notification  Workstation performed: [id number]         XR chest 1 view portable   Final Result by Alban Marquez MD (10/28 2200)      No acute cardiopulmonary disease              Workstation performed: [id number]                    Procedures  ECG 12 Lead Documentation Only  Date/Time: 10/28/2019 3:45 AM  Performed by: Franky Manning MD  Authorized by: Franky Manning MD     Rate:     ECG rate:  135    ECG rate assessment: tachycardic    Rhythm:     Rhythm: sinus tachycardia    ST segments:     ST segments:  Non-specific           ED Course                   Initial Sepsis Screening     Row Name 10/28/19 0458 10/28/19 0457 10/28/19 0400          Is the patient's history suggestive of a new or worsening infection?     (!) Yes (Proceed)  -ML      Suspected source of infection      urinary tract infection  -ML      Are two or more of the following signs & symptoms of infection both present and new to the patient?     (!) Yes (Proceed)  -ML      Indicate SIRS criteria  Tachypnea > 20 resp per min;Leukocytosis (WBC > 95548 IJL)  -ML  Leukocytosis (WBC > 38661 IJL); Tachycardia > 90 bpm  -ML  Tachycardia > 90 bpm  -ML      If the answer is yes to both questions, suspicion of sepsis is present            If severe sepsis is present AND tissue hypoperfusion perists in the hour after fluid resuscitation or lactate > 4, the patient meets criteria for SEPTIC SHOCK            Are any of the following organ dysfunction criteria present within 6 hours of suspected infection and SIRS criteria that are NOT considered to be chronic conditions?   (!) Yes  -ML        Organ dysfunction  SBP decrease > 40 mmHg from baseline;SBP < 90 mmHg  -ML          Date of presentation of severe sepsis            Time of presentation of severe sepsis            Tissue hypoperfusion persists in the hour after crystalloid fluid administration, evidenced, by either:  SBP < 90 mm/Hg ( ___ mm/Hg in comment field)  -ML          Was hypotension present within one hour of the conclusion of crystalloid fluid administration?   Yes  -ML          Date of presentation of septic shock            Time of presentation of septic shock              User Key  (r) = Recorded By, (t) = Taken By, (c) = Cosigned By    [address] Name Provider Augustine Do MD Physician                  MDM  Number of Diagnoses or Management Options  Bowel obstruction [facility]):   Fever:   Hypotension:   Malfunction of penile prosthesis, subsequent encounter:   Sepsis [facility]):   UTI (urinary tract infection):      Amount and/or Complexity of Data Reviewed  Clinical lab tests: ordered and reviewed  Tests in the radiology section of CPT®: ordered and reviewed    Risk of Complications, Morbidity, and/or Mortality  Presenting problems: high  General comments: Aneglia Landrum from ICU evaluated the pt  And said he can go to stepdown under hospitalist's service  sbp is always around 100  Suspect uti - will cover with zosyn (urine clx susceptible to zosyn last time)           Disposition  Final diagnoses:   Fever   Sepsis ([facility] )   UTI (urinary tract infection)   Bowel obstruction (HCC)   Hypotension   Malfunction of penile prosthesis, subsequent encounter     Time reflects when diagnosis was documented in both MDM as applicable and the Disposition within this note     Time User Action Codes Description Comment    10/28/2019  6:29 AM Olivia Hawkins R Add [R50 9] Fever     10/28/2019  6:30 AM Olivia Hawkins R Add [A41 9] Sepsis ([facility] )     10/28/2019  9:13 AM Joylene Emms Add [N39 0] UTI (urinary tract infection)     10/28/2019  9:13 AM Joylene Emms Add [K56 766] Bowel obstruction ([facility] )     10/28/2019 10:02 AM Joylene Emms Add [I95 9] Hypotension     10/28/2019 10:49 AM Karthik Katrina Add [K21 9] Gastroesophageal reflux disease without esophagitis     10/28/2019 10:49 AM Karthik Katrina Add [I10] Benign essential hypertension     10/28/2019 10:49 AM Karthik Katrina Add [E11 22,  N18 1] Controlled type 2 diabetes mellitus with stage 1 chronic kidney disease, unspecified whether long term insulin use ([place] [address] )     10/28/2019 10:50 AM Karthik Katrina Add [L89 154] Stage IV pressure ulcer of sacral region ([facility] )     10/28/2019 10:50 AM Karthik Katrina Modify [L89 154] Stage IV pressure ulcer of sacral region ([facility] )     10/28/2019 10:50 AM Karthik Katrina Add [U22 442] Stage IV pressure ulcer of left heel ([facility] )     10/28/2019 10:50 AM Krystle Spire Modify [Z65 613] Stage IV pressure ulcer of left heel ([address] )     10/28/2019 10:50 AM Krystle Spire Add [B89 025] Decubitus ulcer of ischium, left, stage IV ([address] )     10/28/2019 10:50 AM Krystle Spire Remove [L89 154] Stage IV pressure ulcer of sacral region ([facility] )     10/28/2019 10:51 AM Krystle Spire Add [T83 61XD] 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, subsequent encounter     10/30/2019  8:53 AM Yessica, [address] [place] [address] [L92 387K] Malfunction of penile prosthesis, subsequent encounter     11/1/2019  4:53 PM Tugade Romar Modify [N39 0] UTI (urinary tract infection)     11/1/2019  4:53 PM TugadePlacidoar Modify [T83 61XD] 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, subsequent encounter     11/1/2019  4:53 PM Tugade Romar Modify [T83 490D] Malfunction of penile prosthesis, subsequent encounter     11/5/2019 10:07 AM PrechtelRadhas Modify [N39 0] UTI (urinary tract infection)     11/5/2019 10:07 AM PrecRadha de la cruz Masters Modify [T83 61XD] 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, subsequent encounter     11/5/2019 10:07 AM PrecRadha de la cruzs Modify [T83 490D] Malfunction of penile prosthesis, subsequent encounter     11/5/2019 10:07 AM PrecRadha de la cruzs Add [F11 90] Chronic, continuous use of opioids     11/5/2019 10:07 AM PrecRadha de la cruzs Add [Z86 19] History of Clostridium difficile colitis       ED Disposition     ED Disposition Condition Date/Time Comment    Admit Stable Mon Oct 28, 2019  5:53 PM Case was discussed with Dr Uzair Zabala and the patient's admission status was agreed to be inpatient          MD Documentation      Most Recent Value   Transported by (Company and Unit #)  [employer]      RN Documentation      Most Recent Value   Transported by [employer] and Unit #)  [employer]      Follow-up Information     Follow up With Specialties Details Why Aga  [facility]  Follow up They will call with date/time of next visit  You are receiving a nurse to perform wound care [address] [place] [address]  [phone]            Discharge Medication List as of 11/5/2019 11:41 AM      START taking these medications    Details   oxyCODONE (ROXICODONE) 20 MG TABS Take 1 tablet (20 mg total) by mouth 3 (three) times a day for 10 daysMax Daily Amount: 60 mg, Starting Tue 11/5/2019, Until Fri 11/15/2019, Normal      vancomycin (VANCOCIN) 50mg/mL SOLN Take 2 5 mL (125 mg total) by mouth every 12 (twelve) hours for 3 days, Starting Tue 11/5/2019, Until Fri 11/8/2019, Print         CONTINUE these medications which have NOT CHANGED    Details   ASPIRIN LOW DOSE 81 MG EC tablet TAKE 1 TABLET BY MOUTH EVERY DAY, Normal      ACCU-CHEK FASTCLIX LANCETS MISC USE TWICE DAILY, Normal      ACCU-CHEK SMARTVIEW test strip TEST TWICE DAILY, Normal      acetaminophen (TYLENOL) 325 mg tablet Take 2 tablets (650 mg total) by mouth every 6 (six) hours as needed for mild pain, headaches or fever, Starting Wed 9/25/2019, No Print      ascorbic acid (VITAMIN C) 500 MG tablet Take 1 tablet (500 mg total) by mouth daily with breakfast, Starting Thu 9/26/2019, No Print      Disposable Gloves (LATEX GLOVES LARGE) MISC Use as needed up to 3 times a day dispo 2 boxes, Normal      folic acid (FOLVITE) 1 mg tablet Take 0 5 tablets (500 mcg total) by mouth daily, Starting Thu 9/26/2019, No Print      !! Incontinence Supply Disposable (SUNBEAM INCONTINENT UNDER PAD) MISC Use 1 per week, dispense 4 reusable underpads, Normal      !!  Incontinence Supply Disposable MISC by Does not apply route 2 (two) times a day, Starting Wed 5/22/2019, Normal      omeprazole (PriLOSEC) 20 mg delayed release capsule TAKE 1 CAPSULE(20 MG) BY MOUTH DAILY AT BEDTIME, Normal      saccharomyces boulardii (FLORASTOR) 250 mg capsule Take 1 capsule (250 mg total) by mouth 2 (two) times a day, Starting Wed 9/25/2019, No Print       !! - Potential duplicate medications found  Please discuss with provider        STOP taking these medications       oxyCODONE (OxyCONTIN) 20 mg 12 hr tablet Comments:   Reason for Stopping:         enoxaparin (LOVENOX) 40 mg/0 4 mL Comments:   Reason for Stopping:             Outpatient Discharge Orders   Discharge Diet       ED Provider  Electronically Signed by           Williams Waldron MD  11/06/19 7685

## 2019-10-28 NOTE — ED NOTES
Patient transported to [facility] 05, [address]  10/28/19 7532

## 2019-10-28 NOTE — PROGRESS NOTES
Patient stating that dilaudid IV does not work for pain; only 20 mg oxycodone PO effective for pain, which is on patient's home medication regimen  RN attempted to contact Gen-Surg provider to ask whether patient OK to receive PO medications, as well as SLIM  Awaiting responses      Darinel Liu, RN 10/28/2019 3:21 PM

## 2019-10-28 NOTE — CONSULTS
Consult- Anne Coburn Moscat 1960, 63 y o  male MRN: [id number]    Unit/Bed#: [place] -01 Encounter: [id number]    Primary Care Provider: Bev Denise MD   Date and time admitted to hospital: 10/28/2019  3:48 AM      Inpatient consult to Internal Medicine  Consult performed by: Chary Palmer MD  Consult ordered by: Merlinda Noble, PA-C          Sepsis [facility])  Assessment & Plan  Sepsis probably secondary to UTI, penile prosthesis infection, sacral soft tissue infection  continue broad-spectrum IV antibiotics  Follow ID recommendation      * SBO (small bowel obstruction) ([facility] [address] )  Assessment & Plan  Patient presented with abdominal pain, distention and absent colostomy output for the last few days  Imaging suggestive of small-bowel obstruction  Currently on conservative management as per surgery team  Continue to follow    Diabetes mellitus type II, controlled [facility])  Assessment & Plan  Lab Results   Component Value Date    HGBA1C 5 2 05/06/2019       Recent Labs     10/28/19  1350   POCGLU 82       Blood Sugar Average: Last 72 hrs:  (P) 82     Check blood glucose 4 times daily  Corrective insulin    Anemia  Assessment & Plan  Hemoglobin stable and looks at baseline  Iron studies previously done and showed probably iron-deficiency anemia  No report of EGD or colonoscopy in the past  Transfuse for hemoglobin less than 7    VTE Prophylaxis: Sequential compression device (Venodyne)   / sequential compression device     Counseling / Coordination of Care Time: 45 minutes  Greater than 50% of total time spent on patient counseling and coordination of care  Collaboration of Care: Were Recommendations Directly Discussed with Primary Treatment Team? - No     History of Present Illness:    Bijal Mckinley is a 63 y o  male who is originally admitted to the surgical service due to suspected distal obstruction  We are consulted for management of chronic illness    Patient admitted after he presented with abdominal pain and distention with associated absence colostomy output for the last few days  Patient was found to have low-grade fever tachycardia and borderline blood pressure in ED for which sepsis was considered and management started  Past medical history significant for stage 5 sacral ulcer, left hip disarticulation, historical diabetes mellitus, anemia, paraplegia, GERD  Review of Systems:    Review of Systems   Constitutional: Negative for chills and fever  HENT: Negative for congestion and sore throat  Eyes: Negative for pain and discharge  Respiratory: Negative for cough, wheezing and stridor  Cardiovascular: Negative for chest pain and palpitations  Gastrointestinal: Positive for abdominal distention, abdominal pain and constipation  Negative for blood in stool, nausea and vomiting  Endocrine: Negative for cold intolerance and heat intolerance  Genitourinary: Negative for dysuria, frequency and hematuria  Musculoskeletal: Positive for back pain  Skin: Negative for pallor and rash  Neurological: Negative for dizziness, syncope and numbness  Psychiatric/Behavioral: Negative for agitation, confusion and hallucinations           Past Medical and Surgical History:     Past Medical History:   Diagnosis Date    Anemia     Blind     r eye    Chronic cystitis     Colostomy in place [facility])     Detrusor sphincter dyssynergia     Diabetes mellitus ([facility] [address] )     Poorly controlled type 2; Last Assessed:  3/18/14    Erectile dysfunction     Frequency of urination     GERD (gastroesophageal reflux disease)     History of diabetes mellitus     History of osteomyelitis     Hx of leg amputation (HCC)     r high upper leg    Hyperlipidemia     Hypertension     Hypospadias     Incomplete bladder emptying     Neurogenic bladder     Paralysis (HCC)     Paraplegia (HCC)     Spinal cord cysts     Ulcer of sacral region ([address] )     Urge incontinence        Past Surgical History:   Procedure Laterality Date    AMPUTATION      At hip; Last Assessed:  16    BLADDER SURGERY      COLON SURGERY      llq ostomy pouch    COLOSTOMY      COMPLEX CYSTOMETROGRAM      CT CYSTOGRAM  2019    CYSTOSCOPY      IR PICC REPO  2019    IR SUPRAPUBIC TUBE  2019    LEG AMPUTATION      MEATOTOMY      PENILE PROSTHESIS IMPLANT      IL ADJ TISS XFER SCALP,EXTREM 10 1-30 SQCM Left 2017    Procedure: POSTERIOR THIGH V-Y ADMANCEMENT;  Surgeon: Jessica Hutchins MD;  [facility];  Service: Plastics    IL MUSCLE-SKIN FLAP,TRUNK Left 2017    Procedure: FLAP CLOSURE LEFT ISCHIAL WOUND and "RIGHT" ISCHIAL FLAP ADVANCEMENT * DEBRIDEMENT, VAC PLACEMENT ;  Surgeon: Jessica Hutchins MD;  [facility];  Service: Plastics    IL MUSCLE-SKIN FLAP,TRUNK Left 2017    Procedure: gluteal myocutaneous rotational flap, posterior thigh v to y advancement- wound 5 x 2 5 x 8;  Surgeon: Jessica Hutchins MD;  [facility];  Service: Plastics    SPINE SURGERY      Lower back    UROFLOWMETRY SIMPLE / COMPLEX         Meds/Allergies:    all medications and allergies reviewed    Allergies: No Known Allergies    Social History:     Marital Status: /Civil Union    Substance Use History:   Social History     Substance and Sexual Activity   Alcohol Use Never    Frequency: Never    Comment: Per [employer]:  Social drinker (Onset date:  11/10/17)     Social History     Tobacco Use   Smoking Status Former Smoker    Packs/day: 0 50    Years: 10 00    Pack years: 5 00    Last attempt to quit:     Years since quittin 8   Smokeless Tobacco Never Used   Tobacco Comment    Onset date:  11/10/17     Social History     Substance and Sexual Activity   Drug Use No       Family History:    non-contributory    Physical Exam:     Vitals:   Blood Pressure: 93/55 (10/28/19 1546)  Pulse: 89 (10/28/19 1546)  Temperature: 97 9 °F (36 6 °C) (10/28/19 1546)  Temp Source: Oral (10/28/19 5072)  Respirations: 16 (10/28/19 1546)  Weight - Scale: 66 7 kg (147 lb 0 8 oz) (10/28/19 0343)  SpO2: 99 % (10/28/19 1546)    General appearance: alert, appears stated age and cooperative  Head: Normocephalic, without obvious abnormality, atraumatic  Lungs: clear to auscultation bilaterally  Heart: regular rate and rhythm  Abdomen: Abdomen is distended, mild diffuse tenderness, no rebound tenderness, no output in the colostomy bag, no gaby:  Stomach irritation or discharge  Back: Stage 5 sacral ulcer  Extremities: Left this articulated hip, no peripheral edema  Neurologic: Mental status: alertness: alert    Additional Data:     Lab Results: I have personally reviewed pertinent reports  Results from last 7 days   Lab Units 10/28/19  0400   WBC Thousand/uL 16 39*   HEMOGLOBIN g/dL 10 7*   HEMATOCRIT % 35 5*   PLATELETS Thousands/uL 479*   NEUTROS PCT % 89*   LYMPHS PCT % 7*   MONOS PCT % 3*   EOS PCT % 0     Results from last 7 days   Lab Units 10/28/19  0451   SODIUM mmol/L 132*   POTASSIUM mmol/L 3 5   CHLORIDE mmol/L 98*   CO2 mmol/L 24   BUN mg/dL 23   CREATININE mg/dL 0 86   ANION GAP mmol/L 10   CALCIUM mg/dL 9 0   ALBUMIN g/dL 2 1*  2 1*   TOTAL BILIRUBIN mg/dL 0 87  0 89   ALK PHOS U/L 80  86   ALT U/L 13  14   AST U/L 54*  51*   GLUCOSE RANDOM mg/dL 106         Results from last 7 days   Lab Units 10/28/19  0400   TROPONIN I ng/mL <0 02     Lab Results   Component Value Date/Time    HGBA1C 5 2 05/06/2019 10:11 AM    HGBA1C 5 3 05/06/2019 08:57 AM    HGBA1C 5 6 09/25/2018 10:17 AM    HGBA1C 5 7 05/04/2018    HGBA1C 5 0 11/10/2017 11:03 AM    HGBA1C 5 5 08/09/2017 10:09 AM    HGBA1C 5 6 07/19/2016 09:20 AM    HGBA1C 12 3 (H) 01/06/2014 04:53 AM     Results from last 7 days   Lab Units 10/28/19  1350   POC GLUCOSE mg/dl 82     Results from last 7 days   Lab Units 10/28/19  0400   LACTIC ACID mmol/L 0 9       Imaging: I have personally reviewed pertinent reports        CT abdomen pelvis with contrast Final Result by Rachele Cruz DO (10/28 9501)      There is a Solomon catheter within a mostly decompressed bladder  Portions of the bladder wall didn't appear thickened  Correlate with urinalysis  Small amount of air within the bladder is nonspecific and may be iatrogenic  Markedly dilated proximal and mid jejunum with air-fluid levels consistent with bowel obstruction  There is an abrupt transition of jejunum within the left upper quadrant seen on series 2 images 51 through 57   Wall thickening within this portion of the    jejunum  The obstruction is of unclear etiology and may be related to adhesions  Mid to distal small bowel demonstrates mild fluid distention is mostly decompressed  Mild abdominal and pelvic ascites  2 discrete fluid collections are identified within the abdomen, one anteriorly on the right seen on series 2 image 51 and one within the right paramedian pelvis on image 69  Both of these are also well seen on coronal    imaging, images 37 and 75  They do not demonstrate peripheral wall enhancement at this time  There is a large decubitus ulcer identified within the left posterior perineum with chronic osteomyelitis of the underlying bony pelvis  The ulceration now results in partial exposure of the posterior aspect of the penile implant, a new finding compared    to the prior examination  The study was marked in [facility] for immediate notification  Workstation performed: [id number]         XR chest 1 view portable   Final Result by Serenity Jacobs MD (10/28 1566)      No acute cardiopulmonary disease  Workstation performed: [id number]             EKG, Pathology, and Other Studies Reviewed on Admission:   · EKG:  Normal EKG    ** Please Note: This note has been constructed using a voice recognition system   **

## 2019-10-28 NOTE — ASSESSMENT & PLAN NOTE
Patient presented with abdominal pain, distention and absent colostomy output for the last few days  Imaging suggestive of small-bowel obstruction  Currently on conservative management as per surgery team  Continue to follow

## 2019-10-28 NOTE — ED CARE HANDOFF
Emergency Department Sign Out Note        Sign out and transfer of care from Dr Castillo Figures  See Separate Emergency Department note  The patient, Eric Nunez, was evaluated by the previous provider for Fever, SIRS/Sepsis  Workup Completed:  Labs, CT A/P     ED Course / Workup Pending (followup):  Please see note below  ED Course as of Oct 28 1755   Mon Oct 28, 2019   0752 WBC(!): 16 39   0753 Leukocytes, UA(!): Moderate   0753 Nitrite, UA(!): Positive   0753 WBC, UA(!): Innumerable   0753 Patient received in sign-out for follow-up of workup results including labs, CT scan abdomen pelvis; presentation consistent with UTI as patient history of UTIs in the past; has been running low blood pressures, responding to intravenous fluid; previous provider spoke to Leann Sellers, ICU nurse practitioner, was deemed stable for slim admission  Patient had abdominal pain, CT A/P pending  Bacteria, UA(!): Moderate   0904 CT shows bowel obstruction  Discussed with Dr Melanie Pelayo, will see CT and call back  1001 Patient evaluated by Dr Melanie Pelayo in ER, would be admitted under General Surgery  1005 Blood Pressure: 97/56   1005 Pulse: 92   1005 Respirations: 17   1005 Vitals reviewed, improved     SpO2: 95 %     Procedures  MDM    Disposition  Final diagnoses:   Fever   Sepsis ([facility] [address] )   UTI (urinary tract infection)   Bowel obstruction (HCC)   Hypotension     Time reflects when diagnosis was documented in both MDM as applicable and the Disposition within this note     Time User Action Codes Description Comment    10/28/2019  6:29 AM Nimesh Hawkins Regulus R Add [R50 9] Fever     10/28/2019  6:30 AM Nimesh Hawkins Regulus R Add [A41 9] Sepsis ([facility] [address] )     10/28/2019  9:13 AM Malika Ripa Add [N39 0] UTI (urinary tract infection)     10/28/2019  9:13 AM Malika Ripa Add [O27 850] Bowel obstruction ([facility] [address] )     10/28/2019 10:02 AM Malika Ripa Add [I95 9] Hypotension     10/28/2019 10:49 AM Jennifer Hoskins Add [K21 9] Gastroesophageal reflux disease without esophagitis     10/28/2019 10:49 AM Jennifer Ok Add [I10] Benign essential hypertension     10/28/2019 10:49 AM Jennifer Ok Add [E11 22,  N18 1] Controlled type 2 diabetes mellitus with stage 1 chronic kidney disease, unspecified whether long term insulin use ([facility] [address] )     10/28/2019 10:50 AM Jennifer Ok Add [L89 154] Stage IV pressure ulcer of sacral region ([facility] [address] )     10/28/2019 10:50 AM Jennifer Ok Modify [L89 154] Stage IV pressure ulcer of sacral region ([facility] [address] )     10/28/2019 10:50 AM Jennifer Ok Add [I87 424] Stage IV pressure ulcer of left heel ([facility] [address] )     10/28/2019 10:50 AM Jennifer Ok Modify [K28 878] Stage IV pressure ulcer of left heel ([facility] [address] )     10/28/2019 10:50 AM Jennifer Ok Add [O82 681] Decubitus ulcer of ischium, left, stage IV ([facility] [address] )     10/28/2019 10:50 AM Jennifer Ok Remove [L89 154] Stage IV pressure ulcer of sacral region ([facility] [address] )     10/28/2019 10:51 AM Jennifer Ok Add [T83 61XD] 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, subsequent encounter       ED Disposition     ED Disposition Condition Date/Time Comment    Admit Stable Mon Oct 28, 2019  5:53 PM Case was discussed with Dr Russ Manriquez and the patient's admission status was agreed to be inpatient          Follow-up Information    None       Current Discharge Medication List      CONTINUE these medications which have NOT CHANGED    Details   ASPIRIN LOW DOSE 81 MG EC tablet TAKE 1 TABLET BY MOUTH EVERY DAY  Qty: 30 tablet, Refills: 0    Associated Diagnoses: Chronic low back pain without sciatica, unspecified back pain laterality      oxyCODONE (OxyCONTIN) 20 mg 12 hr tablet Take 1 tablet (20 mg total) by mouth every 12 (twelve) hoursMax Daily Amount: 40 mg  Qty: 60 tablet, Refills: 0    Associated Diagnoses: Paraplegic spinal paralysis (HCC)      ACCU-CHEK FASTCLIX LANCETS MISC USE TWICE DAILY  Qty: 102 each, Refills: 0    Associated Diagnoses: Type 2 diabetes mellitus without complication, without long-term current use of insulin ([facility])      ACCU-CHEK SMARTVIEW test strip TEST TWICE DAILY  Qty: 100 each, Refills: 3    Associated Diagnoses: Type 2 diabetes mellitus without complication, without long-term current use of insulin ([facility])      acetaminophen (TYLENOL) 325 mg tablet Take 2 tablets (650 mg total) by mouth every 6 (six) hours as needed for mild pain, headaches or fever  Qty: 30 tablet, Refills: 0    Associated Diagnoses: Stage IV pressure ulcer of sacral region ([facility])      ascorbic acid (VITAMIN C) 500 MG tablet Take 1 tablet (500 mg total) by mouth daily with breakfast  Refills: 0    Associated Diagnoses: Anemia      Disposable Gloves (LATEX GLOVES LARGE) MISC Use as needed up to 3 times a day dispo 2 boxes  Qty: 2 each, Refills: 11    Associated Diagnoses: Neurogenic dysfunction of the urinary bladder; Paraplegia ([address] ); Stage IV pressure ulcer of left buttock ([address] ); Type 2 diabetes mellitus without complication, without long-term current use of insulin ([facility])      enoxaparin (LOVENOX) 40 mg/0 4 mL Inject 0 4 mL (40 mg total) under the skin daily  Refills: 0    Associated Diagnoses: Paraplegic spinal paralysis ([facility])      folic acid (FOLVITE) 1 mg tablet Take 0 5 tablets (500 mcg total) by mouth daily  Refills: 0    Associated Diagnoses: Anemia      !! Incontinence Supply Disposable (SUNBEAM INCONTINENT UNDER PAD) MISC Use 1 per week, dispense 4 reusable underpads  Qty: 4 each, Refills: 11    Associated Diagnoses: Neurogenic dysfunction of the urinary bladder; Paraplegia ([address] ); Stage IV pressure ulcer of left buttock ([address] )      !! Incontinence Supply Disposable MISC by Does not apply route 2 (two) times a day  Qty: 60 each, Refills: 11    Associated Diagnoses: Neurogenic dysfunction of the urinary bladder; Paraplegia ([address] );  Stage IV pressure ulcer of left buttock (HCC)      omeprazole (PriLOSEC) 20 mg delayed release capsule TAKE 1 CAPSULE(20 MG) BY MOUTH DAILY AT BEDTIME  Qty: 90 capsule, Refills: 0    Associated Diagnoses: Gastroesophageal reflux disease without esophagitis      saccharomyces boulardii (FLORASTOR) 250 mg capsule Take 1 capsule (250 mg total) by mouth 2 (two) times a day  Refills: 0    Associated Diagnoses: History of Clostridium difficile colitis       ! ! - Potential duplicate medications found  Please discuss with provider  No discharge procedures on file         ED Provider  Electronically Signed by     Dany Castillo MD  10/28/19 [phone]

## 2019-10-28 NOTE — ED NOTES
Called lab again  Informed lab CMP needs to be resulted as soon as possible        Uziel Jama RN  10/28/19 0296

## 2019-10-28 NOTE — PLAN OF CARE
Problem: Potential for Falls  Goal: Patient will remain free of falls  Description  INTERVENTIONS:  - Assess patient frequently for physical needs  -  Identify cognitive and physical deficits and behaviors that affect risk of falls  -  [place] fall precautions as indicated by assessment   - Educate patient/family on patient safety including physical limitations  - Instruct patient to call for assistance with activity based on assessment  - Modify environment to reduce risk of injury  - Consider OT/PT consult to assist with strengthening/mobility  Outcome: Progressing     Problem: Prexisting or High Potential for Compromised Skin Integrity  Goal: Skin integrity is maintained or improved  Description  INTERVENTIONS:  - Identify patients at risk for skin breakdown  - Assess and monitor skin integrity  - Assess and monitor nutrition and hydration status  - Monitor labs   - Assess for incontinence   - Turn and reposition patient  - Assist with mobility/ambulation  - Relieve pressure over bony prominences  - Avoid friction and shearing  - Provide appropriate hygiene as needed including keeping skin clean and dry  - Evaluate need for skin moisturizer/barrier cream  - Collaborate with interdisciplinary team   - Patient/family teaching  - Consider wound care consult   Outcome: Progressing     Problem: Nutrition/Hydration-ADULT  Goal: Nutrient/Hydration intake appropriate for improving, restoring or maintaining nutritional needs  Description  Monitor and assess patient's nutrition/hydration status for malnutrition  Collaborate with interdisciplinary team and initiate plan and interventions as ordered  Monitor patient's weight and dietary intake as ordered or per policy  Utilize nutrition screening tool and intervene as necessary  Determine patient's food preferences and provide high-protein, high-caloric foods as appropriate       INTERVENTIONS:  - Monitor oral intake, urinary output, labs, and treatment plans  - Assess nutrition and hydration status and recommend course of action  - Evaluate amount of meals eaten  - Assist patient with eating if necessary   - Allow adequate time for meals  - Recommend/ encourage appropriate diets, oral nutritional supplements, and vitamin/mineral supplements  - Order, calculate, and assess calorie counts as needed  - Recommend, monitor, and adjust tube feedings and TPN/PPN based on assessed needs  - Assess need for intravenous fluids  - Provide specific nutrition/hydration education as appropriate  - Include patient/family/caregiver in decisions related to nutrition  Outcome: Progressing     Problem: PAIN - ADULT  Goal: Verbalizes/displays adequate comfort level or baseline comfort level  Description  Interventions:  - Encourage patient to monitor pain and request assistance  - Assess pain using appropriate pain scale  - Administer analgesics based on type and severity of pain and evaluate response  - Implement non-pharmacological measures as appropriate and evaluate response  - Consider cultural and social influences on pain and pain management  - Notify physician/advanced practitioner if interventions unsuccessful or patient reports new pain  Outcome: Progressing     Problem: INFECTION - ADULT  Goal: Absence or prevention of progression during hospitalization  Description  INTERVENTIONS:  - Assess and monitor for signs and symptoms of infection  - Monitor lab/diagnostic results  - Monitor all insertion sites, i e  indwelling lines, tubes, and drains  - Monitor endotracheal if appropriate and nasal secretions for changes in amount and color  - [place] appropriate cooling/warming therapies per order  - Administer medications as ordered  - Instruct and encourage patient and family to use good hand hygiene technique  - Identify and instruct in appropriate isolation precautions for identified infection/condition  Outcome: Progressing     Problem: SAFETY ADULT  Goal: Maintain or return to baseline ADL function  Description  INTERVENTIONS:  -  Assess patient's ability to carry out ADLs; assess patient's baseline for ADL function and identify physical deficits which impact ability to perform ADLs (bathing, care of mouth/teeth, toileting, grooming, dressing, etc )  - Assess/evaluate cause of self-care deficits   - Assess range of motion  - Assess patient's mobility; develop plan if impaired  - Assess patient's need for assistive devices and provide as appropriate  - Encourage maximum independence but intervene and supervise when necessary  - Involve family in performance of ADLs  - Assess for home care needs following discharge   - Consider OT consult to assist with ADL evaluation and planning for discharge  - Provide patient education as appropriate  Outcome: Progressing  Goal: Maintain or return mobility status to optimal level  Description  INTERVENTIONS:  - Assess patient's baseline mobility status (ambulation, transfers, stairs, etc )    - Identify cognitive and physical deficits and behaviors that affect mobility  - Identify mobility aids required to assist with transfers and/or ambulation (gait belt, sit-to-stand, lift, walker, cane, etc )  - [place] fall precautions as indicated by assessment  - Record patient progress and toleration of activity level on Mobility SBAR; progress patient to next Phase/Stage  - Instruct patient to call for assistance with activity based on assessment  - Consider rehabilitation consult to assist with strengthening/weightbearing, etc   Outcome: Progressing     Problem: DISCHARGE PLANNING  Goal: Discharge to home or other facility with appropriate resources  Description  INTERVENTIONS:  - Identify barriers to discharge w/patient and caregiver  - Arrange for needed discharge resources and transportation as appropriate  - Identify discharge learning needs (meds, wound care, etc )  - Arrange for interpretive services to assist at discharge as needed  - Refer to Case Management Department for coordinating discharge planning if the patient needs post-hospital services based on physician/advanced practitioner order or complex needs related to functional status, cognitive ability, or social support system  Outcome: Progressing     Problem: Knowledge Deficit  Goal: Patient/family/caregiver demonstrates understanding of disease process, treatment plan, medications, and discharge instructions  Description  Complete learning assessment and assess knowledge base    Interventions:  - Provide teaching at level of understanding  - Provide teaching via preferred learning methods  Outcome: Progressing     Problem: GASTROINTESTINAL - ADULT  Goal: Minimal or absence of nausea and/or vomiting  Description  INTERVENTIONS:  - Administer IV fluids if ordered to ensure adequate hydration  - Maintain NPO status until nausea and vomiting are resolved  - Nasogastric tube if ordered  - Administer ordered antiemetic medications as needed  - Provide nonpharmacologic comfort measures as appropriate  - Advance diet as tolerated, if ordered  - Consider nutrition services referral to assist patient with adequate nutrition and appropriate food choices  Outcome: Progressing  Goal: Maintains or returns to baseline bowel function  Description  INTERVENTIONS:  - Assess bowel function  - Encourage oral fluids to ensure adequate hydration  - Administer IV fluids if ordered to ensure adequate hydration  - Administer ordered medications as needed  - Encourage mobilization and activity  - Consider nutritional services referral to assist patient with adequate nutrition and appropriate food choices  Outcome: Progressing  Goal: Maintains adequate nutritional intake  Description  INTERVENTIONS:  - Monitor percentage of each meal consumed  - Identify factors contributing to decreased intake, treat as appropriate  - Assist with meals as needed  - Monitor I&O, weight, and lab values if indicated  - Obtain nutrition services referral as needed  Outcome: Progressing  Goal: Establish and maintain optimal ostomy function  Description  INTERVENTIONS:  - Assess bowel function  - Encourage oral fluids to ensure adequate hydration  - Administer IV fluids if ordered to ensure adequate hydration   - Administer ordered medications as needed  - Encourage mobilization and activity  - Nutrition services referral to assist patient with appropriate food choices  - Assess stoma site  - Consider wound care consult   Outcome: Progressing     Problem: GENITOURINARY - ADULT  Goal: Maintains or returns to baseline urinary function  Description  INTERVENTIONS:  - Assess urinary function  - Encourage oral fluids to ensure adequate hydration if ordered  - Administer IV fluids as ordered to ensure adequate hydration  - Administer ordered medications as needed  - Offer frequent toileting  - Follow urinary retention protocol if ordered  Outcome: Progressing  Goal: Absence of urinary retention  Description  INTERVENTIONS:  - Assess patients ability to void and empty bladder  - Monitor I/O  - Bladder scan as needed  - Discuss with physician/AP medications to alleviate retention as needed  - Discuss catheterization for long term situations as appropriate  Outcome: Progressing  Goal: Urinary catheter remains patent  Description  INTERVENTIONS:  - Assess patency of urinary catheter  - If patient has a chronic dangelo, consider changing catheter if non-functioning  - Follow guidelines for intermittent irrigation of non-functioning urinary catheter  Outcome: Progressing     Problem: SKIN/TISSUE INTEGRITY - ADULT  Goal: Skin integrity remains intact  Description  INTERVENTIONS  - Identify patients at risk for skin breakdown  - Assess and monitor skin integrity  - Assess and monitor nutrition and hydration status  - Monitor labs (i e  albumin)  - Assess for incontinence   - Turn and reposition patient  - Assist with mobility/ambulation  - Relieve pressure over bony prominences  - Avoid friction and shearing  - Provide appropriate hygiene as needed including keeping skin clean and dry  - Evaluate need for skin moisturizer/barrier cream  - Collaborate with interdisciplinary team (i e  Nutrition, Rehabilitation, etc )   - Patient/family teaching  Outcome: Progressing  Goal: Incision(s), wounds(s) or drain site(s) healing without S/S of infection  Description  INTERVENTIONS  - Assess and document risk factors for skin impairment   - Assess and document dressing, incision, wound bed, drain sites and surrounding tissue  - Consider nutrition services referral as needed  - Oral mucous membranes remain intact  - Provide patient/ family education  Outcome: Progressing  Goal: Oral mucous membranes remain intact  Description  INTERVENTIONS  - Assess oral mucosa and hygiene practices  - Implement preventative oral hygiene regimen  - Implement oral medicated treatments as ordered  - Initiate Nutrition services referral as needed  Outcome: Progressing     Problem: HEMATOLOGIC - ADULT  Goal: Maintains hematologic stability  Description  INTERVENTIONS  - Assess for signs and symptoms of bleeding or hemorrhage  - Monitor labs  - Administer supportive blood products/factors as ordered and appropriate  Outcome: Progressing     Problem: MUSCULOSKELETAL - ADULT  Goal: Maintain or return mobility to safest level of function  Description  INTERVENTIONS:  - Assess patient's ability to carry out ADLs; assess patient's baseline for ADL function and identify physical deficits which impact ability to perform ADLs (bathing, care of mouth/teeth, toileting, grooming, dressing, etc )  - Assess/evaluate cause of self-care deficits   - Assess range of motion  - Assess patient's mobility  - Assess patient's need for assistive devices and provide as appropriate  - Encourage maximum independence but intervene and supervise when necessary  - Involve family in performance of ADLs  - Assess for home care needs following discharge - Consider OT consult to assist with ADL evaluation and planning for discharge  - Provide patient education as appropriate  Outcome: Progressing  Goal: Maintain proper alignment of affected body part  Description  INTERVENTIONS:  - Support, maintain and protect limb and body alignment  - Provide patient/ family with appropriate education  Outcome: Progressing

## 2019-10-28 NOTE — ASSESSMENT & PLAN NOTE
Sepsis probably secondary to UTI, penile prosthesis infection, sacral soft tissue infection  continue broad-spectrum IV antibiotics  Follow ID recommendation

## 2019-10-28 NOTE — H&P
History and Physical - General Surgery   Casimiro Chucho Arguello 63 y o  male MRN: [id number]  Unit/Bed#: [place] [id number] Encounter: [id number]    Assessment/Plan   64 yo male with significant medical and surgical history including paraplegia, neurogenic bladder, stage IV sacral ulcers, chronic osteomyelitis of pelvis, DM type 2, HTN, HLD, h/o UTIs with resistant microbes now with suprapubic catheter, right hip disarticulation, diverting colostomy p/w epigastric abdominal pain, nausea, subjective fevers, and no ostomy output since Saturday    CT findings significant for markedly dilated proximal and mid jejunum with air-fluid levels consistent with bowel obstruction and transition point  SBO vs ileus 2/2 UTI/pelvic infection    Patient with 100 7 fever early this am, has been afebrile since  BPs are hypotensive-low normotensive, which remain low despite multiple 1000mL boluses  Leukocytosis 16 39 with left shift  Hgb stable, 10 7  WBCs and bacteria seen on UA, sent for culture    Ostomy bag with no fecal or gaseous output, bowel sounds absent x4, belly is distended and tympanic, non tender, no peritoneal signs    Admit patient to Dr Diamond Lozano service and attempt conservative management at this time  Bowel rest, NPO, IVF  Analgesics and anti-emetics PRN  Place NGT if patient begins vomiting  Will consider CT with PO contrast and delayed imaging in the morning  Consult SLIM for management of blood pressures and other medical comorbidities  Consult wound care for their recommendations  Consult infectious disease for appropriate ABX recommendations      HPI:  Urbano Neal is a 63 y o  male who presents with significant medical and surgical history including paraplegia, neurogenic bladder, stage IV sacral ulcers, chronic osteomyelitis of pelvis, diabetes mellitus type 2, hypertension, history of UTIs with resistant microbes now with suprapubic catheter, right hip disarticulation, diverting colostomy, h/o SBO    Patient presents today epigastric abdominal pain with associated nausea without vomiting and decrease in ostomy output  Patient states the pain began Saturday when he ran out of his oxycodone solution  That day he also noted no stool or gaseous output in his ostomy bag  Since then he has continued to have this pain and nausea, he denies any vomiting  He also has no appetite, only planning to have eaten some soup over the last 2 days  He also endorses subjective fevers yesterday and this morning, no other associated symptoms  Patient with complex urological history including infected penile implantation 2/2 stage IV ischial ulcers, recurrent UTIs with suprapubic catheter now with UTI again and h/o MRSA, ESBL, and Pseudomonas  On exam, ostomy bag is devoid of fecal material or much gaseous output  Belly is obese, soft, not very tender to palpation, but is quite distended and tympanic  Will attempt conservative management at this time        Review of Systems  Review of Systems - History obtained from the patient  General ROS: positive for subjective fevers; negative for - chills  ENT ROS: negative for - hearing change or visual changes  Hematological and Lymphatic ROS: negative for - bleeding problems or bruising  Respiratory ROS: no cough, shortness of breath, or wheezing  Cardiovascular ROS: no chest pain or dyspnea on exertion  Gastrointestinal ROS: see above  Genito-Urinary ROS: see above     Historical Information   Past Medical History:   Diagnosis Date    Anemia     Blind     r eye    Chronic cystitis     Colostomy in place [facility])     Detrusor sphincter dyssynergia     Diabetes mellitus ([facility] [address] )     Poorly controlled type 2; Last Assessed:  3/18/14    Erectile dysfunction     Frequency of urination     GERD (gastroesophageal reflux disease)     History of diabetes mellitus     History of osteomyelitis     Hx of leg amputation (HCC)     r high upper leg    Hyperlipidemia     Hypertension     Hypospadias  Incomplete bladder emptying     Neurogenic bladder     Paralysis (HCC)     Paraplegia (HCC)     Spinal cord cysts     Ulcer of sacral region [facility])     Urge incontinence      Past Surgical History:   Procedure Laterality Date    AMPUTATION      At hip; Last Assessed:  16    BLADDER SURGERY      COLON SURGERY      llq ostomy pouch    COLOSTOMY      COMPLEX CYSTOMETROGRAM      CT CYSTOGRAM  2019    CYSTOSCOPY      IR PICC REPO  2019    IR SUPRAPUBIC TUBE  2019    LEG AMPUTATION      MEATOTOMY      PENILE PROSTHESIS IMPLANT      AK ADJ TISS XFER SCALP,EXTREM 10 1-30 SQCM Left 2017    Procedure: POSTERIOR THIGH V-Y ADMANCEMENT;  Surgeon: Pretty Maciel MD;  [facility];  Service: Plastics    AK MUSCLE-SKIN FLAP,TRUNK Left 2017    Procedure: FLAP CLOSURE LEFT ISCHIAL WOUND and "RIGHT" ISCHIAL FLAP ADVANCEMENT * DEBRIDEMENT, VAC PLACEMENT ;  Surgeon: Pretty Maciel MD;  [facility];  Service: Plastics    AK MUSCLE-SKIN FLAP,TRUNK Left 2017    Procedure: gluteal myocutaneous rotational flap, posterior thigh v to y advancement- wound 5 x 2 5 x 8;  Surgeon: Pretty Maciel MD;  [facility];  Service: Plastics    SPINE SURGERY      Lower back    UROFLOWMETRY SIMPLE / COMPLEX       Social History   Social History     Substance and Sexual Activity   Alcohol Use Never    Frequency: Never    Comment: Per [employer]:  Social drinker (Onset date:  11/10/17)     Social History     Substance and Sexual Activity   Drug Use No     Social History     Tobacco Use   Smoking Status Former Smoker    Packs/day: 0 50    Years: 10 00    Pack years: 5 00    Last attempt to quit: Jacky Walker Years since quittin 8   Smokeless Tobacco Never Used   Tobacco Comment    Onset date:  11/10/17     Family History:   Family History   Problem Relation Age of Onset    No Known Problems Mother     No Known Problems Father        Meds/Allergies   PTA meds: Prior to Admission Medications   Prescriptions Last Dose Informant Patient Reported? Taking?    ACCU-CHEK FASTCLIX LANCETS MISC   No No   Sig: USE TWICE DAILY   ACCU-CHEK SMARTVIEW test strip  Self No No   Sig: TEST TWICE DAILY   ASPIRIN LOW DOSE 81 MG EC tablet Past Week at Unknown time Self No Yes   Sig: TAKE 1 TABLET BY MOUTH EVERY DAY   Disposable Gloves (LATEX GLOVES LARGE) MISC  Self No No   Sig: Use as needed up to 3 times a day dispo 2 boxes   Incontinence Supply Disposable (SUNBEAM INCONTINENT UNDER PAD) MISC  Self No No   Sig: Use 1 per week, dispense 4 reusable underpads   Incontinence Supply Disposable MISC  Self No No   Sig: by Does not apply route 2 (two) times a day   acetaminophen (TYLENOL) 325 mg tablet Not Taking at Unknown time Self No No   Sig: Take 2 tablets (650 mg total) by mouth every 6 (six) hours as needed for mild pain, headaches or fever   Patient not taking: Reported on 10/28/2019   ascorbic acid (VITAMIN C) 500 MG tablet Not Taking at Unknown time Self No No   Sig: Take 1 tablet (500 mg total) by mouth daily with breakfast   Patient not taking: Reported on 10/28/2019   enoxaparin (LOVENOX) 40 mg/0 4 mL Not Taking at Unknown time Self No No   Sig: Inject 0 4 mL (40 mg total) under the skin daily   Patient not taking: Reported on 27/85/5732   folic acid (FOLVITE) 1 mg tablet Not Taking at Unknown time Self No No   Sig: Take 0 5 tablets (500 mcg total) by mouth daily   Patient not taking: Reported on 10/28/2019   omeprazole (PriLOSEC) 20 mg delayed release capsule Not Taking at Unknown time Self No No   Sig: TAKE 1 CAPSULE(20 MG) BY MOUTH DAILY AT BEDTIME   Patient not taking: Reported on 10/28/2019   oxyCODONE (OxyCONTIN) 20 mg 12 hr tablet Past Week at Unknown time  No Yes   Sig: Take 1 tablet (20 mg total) by mouth every 12 (twelve) hoursMax Daily Amount: 40 mg   saccharomyces boulardii (FLORASTOR) 250 mg capsule Not Taking at Unknown time Self No No   Sig: Take 1 capsule (250 mg total) by mouth 2 (two) times a day   Patient not taking: Reported on 10/28/2019      Facility-Administered Medications: None     No Known Allergies    Objective   First Vitals:   Blood Pressure: 100/67 (10/28/19 0341)  Pulse: (!) 137 (10/28/19 0341)  Temperature: 100 2 °F (37 9 °C) (10/28/19 0343)  Temp Source: Oral (10/28/19 0343)  Respirations: 20 (10/28/19 0341)  Weight - Scale: 66 7 kg (147 lb 0 8 oz) (10/28/19 0343)  SpO2: 95 % (10/28/19 0341)    Current Vitals:   Blood Pressure: (!) 80/50 (10/28/19 1047)  Pulse: 102 (10/28/19 1047)  Temperature: 98 1 °F (36 7 °C) (10/28/19 1047)  Temp Source: Oral (10/28/19 0707)  Respirations: 18 (10/28/19 1047)  Weight - Scale: 66 7 kg (147 lb 0 8 oz) (10/28/19 0343)  SpO2: 96 % (10/28/19 1047)      Intake/Output Summary (Last 24 hours) at 10/28/2019 1057  Last data filed at 10/28/2019 0735  Gross per 24 hour   Intake 3150 ml   Output    Net 3150 ml       Invasive Devices     Peripheral Intravenous Line            Peripheral IV 10/28/19 Left Hand less than 1 day    Peripheral IV 10/28/19 Right;Distal Forearm less than 1 day          Drain            Colostomy Descending/sigmoid LLQ -- days    Suprapubic Catheter Non-latex 16 Fr  38 days                Physical Exam  BP (!) 80/50   Pulse 102   Temp 98 1 °F (36 7 °C)   Resp 18   Wt 66 7 kg (147 lb 0 8 oz)   SpO2 96%   BMI 23 03 kg/m²   General appearance: alert and oriented, in no acute distress  Head: Normocephalic, without obvious abnormality, atraumatic  Eyes: cloudy right cornea; otherwise sclera anicteric  Neck: supple, symmetrical, trachea midline  Lungs: clear to auscultation bilaterally  Heart: regular rate and rhythm, S1, S2 normal, no murmur, click, rub or gallop  Abdomen: obese, well-healed scars from prior surgery noted, no fecal or gaseous output in ostomy bag, ostomy appears pink and healthy; bowel sounds absent; non-tender, but quite distended and tympanic  Male genitalia: suprapubic catheter present  Extremities: s/p right hip disarticulation    Lab Results:   I have personally reviewed pertinent lab results  , CBC:   Lab Results   Component Value Date    WBC 16 39 (H) 10/28/2019    HGB 10 7 (L) 10/28/2019    HCT 35 5 (L) 10/28/2019    MCV 79 (L) 10/28/2019     (H) 10/28/2019    MCH 23 7 (L) 10/28/2019    MCHC 30 1 (L) 10/28/2019    RDW 18 7 (H) 10/28/2019    MPV 10 1 10/28/2019    NRBC 0 10/28/2019   , CMP:   Lab Results   Component Value Date    SODIUM 132 (L) 10/28/2019    K 3 5 10/28/2019    CL 98 (L) 10/28/2019    CO2 24 10/28/2019    BUN 23 10/28/2019    CREATININE 0 86 10/28/2019    CALCIUM 9 0 10/28/2019    AST 51 (H) 10/28/2019    AST 54 (H) 10/28/2019    ALT 14 10/28/2019    ALT 13 10/28/2019    ALKPHOS 86 10/28/2019    ALKPHOS 80 10/28/2019    EGFR 96 10/28/2019     Imaging: I have personally reviewed pertinent films in PACS  Procedure: Xr Chest 1 View Portable    Result Date: 10/28/2019  Narrative: CHEST INDICATION:   fever, weak  COMPARISON:  9/23/2019 EXAM PERFORMED/VIEWS:  XR CHEST PORTABLE FINDINGS: Cardiomediastinal silhouette appears unremarkable  The lungs are clear  No pneumothorax or pleural effusion  Stable osseous structures  Chronic dislocation right shoulder  Impression: No acute cardiopulmonary disease  Workstation performed: [id number]     Procedure: Ct Abdomen Pelvis With Contrast    Result Date: 10/28/2019  Narrative: CT ABDOMEN AND PELVIS WITH IV CONTRAST INDICATION:   abd  pain, fever, nausea  COMPARISON:  9/19/2019 TECHNIQUE:  CT examination of the abdomen and pelvis was performed  Axial, sagittal, and coronal 2D reformatted images were created from the source data and submitted for interpretation  Radiation dose length product (DLP) for this visit:  437 mGy-cm     This examination, like all CT scans performed in the [facility], was performed utilizing techniques to minimize radiation dose exposure, including the use of iterative reconstruction and automated exposure control  IV Contrast:  100 mL of iohexol (OMNIPAQUE) Enteric Contrast:  Enteric contrast was not administered  FINDINGS: ABDOMEN LOWER CHEST:  Mild basilar atelectatic change  LIVER/BILIARY TREE:  Unremarkable  GALLBLADDER:  No calcified gallstones  No pericholecystic inflammatory change  SPLEEN:  Unremarkable  PANCREAS:  Unremarkable  ADRENAL GLANDS:  Unremarkable  KIDNEYS/URETERS:  No hydronephrosis or nephrolithiasis  Bilateral renal cysts are noted  STOMACH AND BOWEL:  Limited without oral contrast   There is some fluid layering within the dependent portion of the stomach  Normal duodenum  There is marked dilatation of the proximal jejunum within the upper abdomen with abrupt transition seen in the left mid abdomen, series 2 image 54  This portion of the jejunum where there is abrupt transition demonstrates circumferential thickening of the bowel wall  There is fluid distention of the mid to distal small bowel loops  Cecum is located within the midline of the mid abdomen  Mild fluid distention of the right colon without dilatation  Transverse colon and left colon are mostly decompressed  The left colon terminates within a left paramedian abdominal wall ostomy  The visualized sigmoid colon within the pelvis is unremarkable  Possible mass within the rectum incompletely evaluated on this examination  The appearance of the rectum is unchanged  APPENDIX:  No findings to suggest appendicitis  ABDOMINOPELVIC CAVITY:  There is no pneumoperitoneum  No pathologic adenopathy or discrete soft tissue mass identified  Small collections of fluid are scattered within the abdomen  There is a small collection anterior to the right colon within the right lower quadrant on series 2 image 51 measuring 4 7 x 1 4 cm  There is a small collection of fluid within the right paramedian pelvis on series 2 image 69 measuring 4 6 x 2 3 cm    Both of these are new compared to the prior examination  Small amount of ascites is present within the left pericolic gutter  VESSELS:  Unremarkable for patient's age  PELVIS REPRODUCTIVE ORGANS:  Penile implant present  The balloon within the anterior inferior pelvis is decompressed  URINARY BLADDER:  Suprapubic catheter within a mostly decompressed bladder, limiting evaluation  Portions of the lateral do demonstrate wall thickening  Small amount of air within the bladder is nonspecific and may be iatrogenic  ABDOMINAL WALL/INGUINAL REGIONS:  As described above there is a left paramedian lower abdominal/pelvic colostomy  Small fat-containing umbilical and periumbilical hernia  Inguinal regions are unremarkable  There is a large decubitus ulcer again present within the posterior inferior pelvis and perineum overlying the issue of the pelvis  A portion of the penile prosthesis posteriorly appears exposed at the level of the decubitus ulcer  This finding is new and indicates progression of ulceration compared to the prior examination  OSSEOUS STRUCTURES:  Chronic osteomyelitis is seen involving the bony pelvis including the left inferior pubic ramus, ischium and right aspect of the pelvis and hip  These findings appear unchanged  Impression: There is a Solomon catheter within a mostly decompressed bladder  Portions of the bladder wall didn't appear thickened  Correlate with urinalysis  Small amount of air within the bladder is nonspecific and may be iatrogenic  Markedly dilated proximal and mid jejunum with air-fluid levels consistent with bowel obstruction  There is an abrupt transition of jejunum within the left upper quadrant seen on series 2 images 51 through 57   Wall thickening within this portion of the  jejunum  The obstruction is of unclear etiology and may be related to adhesions  Mid to distal small bowel demonstrates mild fluid distention is mostly decompressed  Mild abdominal and pelvic ascites    2 discrete fluid collections are identified within the abdomen, one anteriorly on the right seen on series 2 image 51 and one within the right paramedian pelvis on image 69  Both of these are also well seen on coronal  imaging, images 37 and 75  They do not demonstrate peripheral wall enhancement at this time  There is a large decubitus ulcer identified within the left posterior perineum with chronic osteomyelitis of the underlying bony pelvis  The ulceration now results in partial exposure of the posterior aspect of the penile implant, a new finding compared  to the prior examination  The study was marked in [facility] for immediate notification   Workstation performed: Frankie Martinez PA-C   10/28/2019

## 2019-10-28 NOTE — PROGRESS NOTES
Patient not ordered home medications or insulin coverage/blood glucose checks  Dr Bull Farooq aware  Awaiting order placements  Patient resting in bed with call bell within reach  Will continue to monitor      Kay Montes RN 10/28/2019 1:17 PM

## 2019-10-28 NOTE — DISCHARGE INSTR - OTHER ORDERS
Wound Care:  Left Distal Buttock--Cleanse/Irrigate wound with Normal Saline  Please ensure the tunnel at 12 oclock and at 4 oclock are irrigated well with NSS  Apply primary dressing: -AMD Kerlix packing  Apply secondary dressing: - 4x4s, ABD and optilock  Secure with: - medfix tape  Daily dressing change & prn  - top dressing PRN ([facility] to do M-W-F, patient and family to do remaining days)  Avoid pressure at wound site - all wounds  Wheelchair Cushion - ROHO cushion  Do Not Sit for Long Periods of Time - 1 hr max for meals only, otherwise in bed and turn side to side at least every 3 hours or more frequently  Off Loading Mattress - air mattress  Left mid buttock and right distal buttock--cleanse with normal saline, pat dry  Apply silvasorb gel, 2x2, and secure with medfix tape  Change dressing daily and PRN with soilage  Follow-up at wound center--[phone]      Suprapubic Catheter Care:  [facility] to change tubing this week (Thursday or Friday) and then every 4-6 weeks

## 2019-10-29 ENCOUNTER — APPOINTMENT (INPATIENT)
Dept: RADIOLOGY | Facility: HOSPITAL | Age: 59
DRG: 987 | End: 2019-10-29
Payer: MEDICARE

## 2019-10-29 PROBLEM — E87.6 HYPOKALEMIA: Status: ACTIVE | Noted: 2019-10-29

## 2019-10-29 LAB
ALBUMIN SERPL BCP-MCNC: 1.6 G/DL (ref 3.5–5)
ALP SERPL-CCNC: 63 U/L (ref 46–116)
ALT SERPL W P-5'-P-CCNC: 7 U/L (ref 12–78)
ANION GAP SERPL CALCULATED.3IONS-SCNC: 12 MMOL/L (ref 4–13)
AST SERPL W P-5'-P-CCNC: 25 U/L (ref 5–45)
BASOPHILS # BLD AUTO: 0.02 THOUSANDS/ΜL (ref 0–0.1)
BASOPHILS NFR BLD AUTO: 0 % (ref 0–1)
BILIRUB SERPL-MCNC: 0.71 MG/DL (ref 0.2–1)
BUN SERPL-MCNC: 14 MG/DL (ref 5–25)
CALCIUM SERPL-MCNC: 8.4 MG/DL (ref 8.3–10.1)
CHLORIDE SERPL-SCNC: 104 MMOL/L (ref 100–108)
CO2 SERPL-SCNC: 20 MMOL/L (ref 21–32)
CREAT SERPL-MCNC: 0.57 MG/DL (ref 0.6–1.3)
EOSINOPHIL # BLD AUTO: 0.02 THOUSAND/ΜL (ref 0–0.61)
EOSINOPHIL NFR BLD AUTO: 0 % (ref 0–6)
ERYTHROCYTE [DISTWIDTH] IN BLOOD BY AUTOMATED COUNT: 18.7 % (ref 11.6–15.1)
GFR SERPL CREATININE-BSD FRML MDRD: 113 ML/MIN/1.73SQ M
GLUCOSE SERPL-MCNC: 64 MG/DL (ref 65–140)
HCT VFR BLD AUTO: 27.8 % (ref 36.5–49.3)
HGB BLD-MCNC: 8.1 G/DL (ref 12–17)
IMM GRANULOCYTES # BLD AUTO: 0.02 THOUSAND/UL (ref 0–0.2)
IMM GRANULOCYTES NFR BLD AUTO: 0 % (ref 0–2)
LYMPHOCYTES # BLD AUTO: 0.58 THOUSANDS/ΜL (ref 0.6–4.47)
LYMPHOCYTES NFR BLD AUTO: 8 % (ref 14–44)
MCH RBC QN AUTO: 23.9 PG (ref 26.8–34.3)
MCHC RBC AUTO-ENTMCNC: 29.1 G/DL (ref 31.4–37.4)
MCV RBC AUTO: 82 FL (ref 82–98)
MONOCYTES # BLD AUTO: 0.4 THOUSAND/ΜL (ref 0.17–1.22)
MONOCYTES NFR BLD AUTO: 6 % (ref 4–12)
NEUTROPHILS # BLD AUTO: 5.85 THOUSANDS/ΜL (ref 1.85–7.62)
NEUTS SEG NFR BLD AUTO: 86 % (ref 43–75)
NRBC BLD AUTO-RTO: 0 /100 WBCS
PLATELET # BLD AUTO: 334 THOUSANDS/UL (ref 149–390)
PMV BLD AUTO: 9.8 FL (ref 8.9–12.7)
POTASSIUM SERPL-SCNC: 3.2 MMOL/L (ref 3.5–5.3)
PROT SERPL-MCNC: 6.8 G/DL (ref 6.4–8.2)
RBC # BLD AUTO: 3.39 MILLION/UL (ref 3.88–5.62)
SODIUM SERPL-SCNC: 136 MMOL/L (ref 136–145)
WBC # BLD AUTO: 6.89 THOUSAND/UL (ref 4.31–10.16)

## 2019-10-29 PROCEDURE — 99232 SBSQ HOSP IP/OBS MODERATE 35: CPT | Performed by: SURGERY

## 2019-10-29 PROCEDURE — 74022 RADEX COMPL AQT ABD SERIES: CPT

## 2019-10-29 PROCEDURE — 85025 COMPLETE CBC W/AUTO DIFF WBC: CPT | Performed by: PHYSICIAN ASSISTANT

## 2019-10-29 PROCEDURE — 99232 SBSQ HOSP IP/OBS MODERATE 35: CPT | Performed by: PHYSICIAN ASSISTANT

## 2019-10-29 PROCEDURE — 99233 SBSQ HOSP IP/OBS HIGH 50: CPT | Performed by: INTERNAL MEDICINE

## 2019-10-29 PROCEDURE — 80053 COMPREHEN METABOLIC PANEL: CPT | Performed by: PHYSICIAN ASSISTANT

## 2019-10-29 RX ORDER — PANTOPRAZOLE SODIUM 20 MG/1
20 TABLET, DELAYED RELEASE ORAL
Status: DISCONTINUED | OUTPATIENT
Start: 2019-10-30 | End: 2019-11-05 | Stop reason: HOSPADM

## 2019-10-29 RX ORDER — POTASSIUM CHLORIDE 14.9 MG/ML
20 INJECTION INTRAVENOUS ONCE
Status: COMPLETED | OUTPATIENT
Start: 2019-10-29 | End: 2019-10-29

## 2019-10-29 RX ORDER — HYDROMORPHONE HCL/PF 1 MG/ML
0.5 SYRINGE (ML) INJECTION EVERY 4 HOURS PRN
Status: DISCONTINUED | OUTPATIENT
Start: 2019-10-29 | End: 2019-11-05 | Stop reason: HOSPADM

## 2019-10-29 RX ORDER — OXYCODONE HCL 20 MG/1
20 TABLET, FILM COATED, EXTENDED RELEASE ORAL EVERY 12 HOURS SCHEDULED
Status: DISCONTINUED | OUTPATIENT
Start: 2019-10-29 | End: 2019-10-30

## 2019-10-29 RX ORDER — OXYCODONE HCL 20 MG/1
20 TABLET, FILM COATED, EXTENDED RELEASE ORAL EVERY 12 HOURS SCHEDULED
Status: DISCONTINUED | OUTPATIENT
Start: 2019-10-29 | End: 2019-10-29

## 2019-10-29 RX ADMIN — HEPARIN SODIUM 5000 UNITS: 5000 INJECTION INTRAVENOUS; SUBCUTANEOUS at 21:47

## 2019-10-29 RX ADMIN — MORPHINE SULFATE 2 MG: 2 INJECTION, SOLUTION INTRAMUSCULAR; INTRAVENOUS at 12:50

## 2019-10-29 RX ADMIN — OXYCODONE HYDROCHLORIDE 20 MG: 20 TABLET, FILM COATED, EXTENDED RELEASE ORAL at 18:05

## 2019-10-29 RX ADMIN — VANCOMYCIN HYDROCHLORIDE 125 MG: 500 INJECTION, POWDER, LYOPHILIZED, FOR SOLUTION INTRAVENOUS at 21:47

## 2019-10-29 RX ADMIN — MORPHINE SULFATE 2 MG: 2 INJECTION, SOLUTION INTRAMUSCULAR; INTRAVENOUS at 09:25

## 2019-10-29 RX ADMIN — PIPERACILLIN SODIUM,TAZOBACTAM SODIUM 3.38 G: 3; .375 INJECTION, POWDER, FOR SOLUTION INTRAVENOUS at 22:57

## 2019-10-29 RX ADMIN — HYDROMORPHONE HYDROCHLORIDE 0.5 MG: 1 INJECTION, SOLUTION INTRAMUSCULAR; INTRAVENOUS; SUBCUTANEOUS at 01:43

## 2019-10-29 RX ADMIN — ACETAMINOPHEN 650 MG: 325 TABLET ORAL at 22:57

## 2019-10-29 RX ADMIN — HYDROMORPHONE HYDROCHLORIDE 0.5 MG: 1 INJECTION, SOLUTION INTRAMUSCULAR; INTRAVENOUS; SUBCUTANEOUS at 15:02

## 2019-10-29 RX ADMIN — MORPHINE SULFATE 2 MG: 2 INJECTION, SOLUTION INTRAMUSCULAR; INTRAVENOUS at 05:51

## 2019-10-29 RX ADMIN — SODIUM CHLORIDE, SODIUM LACTATE, POTASSIUM CHLORIDE, AND CALCIUM CHLORIDE 125 ML/HR: .6; .31; .03; .02 INJECTION, SOLUTION INTRAVENOUS at 17:46

## 2019-10-29 RX ADMIN — POTASSIUM CHLORIDE 20 MEQ: 14.9 INJECTION, SOLUTION INTRAVENOUS at 09:24

## 2019-10-29 RX ADMIN — PIPERACILLIN SODIUM,TAZOBACTAM SODIUM 3.38 G: 3; .375 INJECTION, POWDER, FOR SOLUTION INTRAVENOUS at 17:46

## 2019-10-29 RX ADMIN — PIPERACILLIN SODIUM,TAZOBACTAM SODIUM 3.38 G: 3; .375 INJECTION, POWDER, FOR SOLUTION INTRAVENOUS at 04:21

## 2019-10-29 RX ADMIN — HEPARIN SODIUM 5000 UNITS: 5000 INJECTION INTRAVENOUS; SUBCUTANEOUS at 14:58

## 2019-10-29 RX ADMIN — SODIUM CHLORIDE, SODIUM LACTATE, POTASSIUM CHLORIDE, AND CALCIUM CHLORIDE 125 ML/HR: .6; .31; .03; .02 INJECTION, SOLUTION INTRAVENOUS at 04:21

## 2019-10-29 RX ADMIN — VANCOMYCIN HYDROCHLORIDE 125 MG: 500 INJECTION, POWDER, LYOPHILIZED, FOR SOLUTION INTRAVENOUS at 09:25

## 2019-10-29 RX ADMIN — HEPARIN SODIUM 5000 UNITS: 5000 INJECTION INTRAVENOUS; SUBCUTANEOUS at 05:50

## 2019-10-29 RX ADMIN — PIPERACILLIN SODIUM,TAZOBACTAM SODIUM 3.38 G: 3; .375 INJECTION, POWDER, FOR SOLUTION INTRAVENOUS at 11:52

## 2019-10-29 NOTE — PLAN OF CARE
Problem: Potential for Falls  Goal: Patient will remain free of falls  Description  INTERVENTIONS:  - Assess patient frequently for physical needs  -  Identify cognitive and physical deficits and behaviors that affect risk of falls  -  [place] fall precautions as indicated by assessment   - Educate patient/family on patient safety including physical limitations  - Instruct patient to call for assistance with activity based on assessment  - Modify environment to reduce risk of injury  - Consider OT/PT consult to assist with strengthening/mobility  Outcome: Progressing     Problem: Prexisting or High Potential for Compromised Skin Integrity  Goal: Skin integrity is maintained or improved  Description  INTERVENTIONS:  - Identify patients at risk for skin breakdown  - Assess and monitor skin integrity  - Assess and monitor nutrition and hydration status  - Monitor labs   - Assess for incontinence   - Turn and reposition patient  - Assist with mobility/ambulation  - Relieve pressure over bony prominences  - Avoid friction and shearing  - Provide appropriate hygiene as needed including keeping skin clean and dry  - Evaluate need for skin moisturizer/barrier cream  - Collaborate with interdisciplinary team   - Patient/family teaching  - Consider wound care consult   Outcome: Progressing     Problem: Nutrition/Hydration-ADULT  Goal: Nutrient/Hydration intake appropriate for improving, restoring or maintaining nutritional needs  Description  Monitor and assess patient's nutrition/hydration status for malnutrition  Collaborate with interdisciplinary team and initiate plan and interventions as ordered  Monitor patient's weight and dietary intake as ordered or per policy  Utilize nutrition screening tool and intervene as necessary  Determine patient's food preferences and provide high-protein, high-caloric foods as appropriate       INTERVENTIONS:  - Monitor oral intake, urinary output, labs, and treatment plans  - Assess nutrition and hydration status and recommend course of action  - Evaluate amount of meals eaten  - Assist patient with eating if necessary   - Allow adequate time for meals  - Recommend/ encourage appropriate diets, oral nutritional supplements, and vitamin/mineral supplements  - Order, calculate, and assess calorie counts as needed  - Recommend, monitor, and adjust tube feedings and TPN/PPN based on assessed needs  - Assess need for intravenous fluids  - Provide specific nutrition/hydration education as appropriate  - Include patient/family/caregiver in decisions related to nutrition  Outcome: Progressing     Problem: PAIN - ADULT  Goal: Verbalizes/displays adequate comfort level or baseline comfort level  Description  Interventions:  - Encourage patient to monitor pain and request assistance  - Assess pain using appropriate pain scale  - Administer analgesics based on type and severity of pain and evaluate response  - Implement non-pharmacological measures as appropriate and evaluate response  - Consider cultural and social influences on pain and pain management  - Notify physician/advanced practitioner if interventions unsuccessful or patient reports new pain  Outcome: Progressing     Problem: INFECTION - ADULT  Goal: Absence or prevention of progression during hospitalization  Description  INTERVENTIONS:  - Assess and monitor for signs and symptoms of infection  - Monitor lab/diagnostic results  - Monitor all insertion sites, i e  indwelling lines, tubes, and drains  - Monitor endotracheal if appropriate and nasal secretions for changes in amount and color  - [place] appropriate cooling/warming therapies per order  - Administer medications as ordered  - Instruct and encourage patient and family to use good hand hygiene technique  - Identify and instruct in appropriate isolation precautions for identified infection/condition  Outcome: Progressing     Problem: SAFETY ADULT  Goal: Maintain or return to baseline ADL function  Description  INTERVENTIONS:  -  Assess patient's ability to carry out ADLs; assess patient's baseline for ADL function and identify physical deficits which impact ability to perform ADLs (bathing, care of mouth/teeth, toileting, grooming, dressing, etc )  - Assess/evaluate cause of self-care deficits   - Assess range of motion  - Assess patient's mobility; develop plan if impaired  - Assess patient's need for assistive devices and provide as appropriate  - Encourage maximum independence but intervene and supervise when necessary  - Involve family in performance of ADLs  - Assess for home care needs following discharge   - Consider OT consult to assist with ADL evaluation and planning for discharge  - Provide patient education as appropriate  Outcome: Progressing  Goal: Maintain or return mobility status to optimal level  Description  INTERVENTIONS:  - Assess patient's baseline mobility status (ambulation, transfers, stairs, etc )    - Identify cognitive and physical deficits and behaviors that affect mobility  - Identify mobility aids required to assist with transfers and/or ambulation (gait belt, sit-to-stand, lift, walker, cane, etc )  - [place] fall precautions as indicated by assessment  - Record patient progress and toleration of activity level on Mobility SBAR; progress patient to next Phase/Stage  - Instruct patient to call for assistance with activity based on assessment  - Consider rehabilitation consult to assist with strengthening/weightbearing, etc   Outcome: Progressing     Problem: DISCHARGE PLANNING  Goal: Discharge to home or other facility with appropriate resources  Description  INTERVENTIONS:  - Identify barriers to discharge w/patient and caregiver  - Arrange for needed discharge resources and transportation as appropriate  - Identify discharge learning needs (meds, wound care, etc )  - Arrange for interpretive services to assist at discharge as needed  - Refer to Case Management Department for coordinating discharge planning if the patient needs post-hospital services based on physician/advanced practitioner order or complex needs related to functional status, cognitive ability, or social support system  Outcome: Progressing     Problem: Knowledge Deficit  Goal: Patient/family/caregiver demonstrates understanding of disease process, treatment plan, medications, and discharge instructions  Description  Complete learning assessment and assess knowledge base    Interventions:  - Provide teaching at level of understanding  - Provide teaching via preferred learning methods  Outcome: Progressing     Problem: GASTROINTESTINAL - ADULT  Goal: Minimal or absence of nausea and/or vomiting  Description  INTERVENTIONS:  - Administer IV fluids if ordered to ensure adequate hydration  - Maintain NPO status until nausea and vomiting are resolved  - Nasogastric tube if ordered  - Administer ordered antiemetic medications as needed  - Provide nonpharmacologic comfort measures as appropriate  - Advance diet as tolerated, if ordered  - Consider nutrition services referral to assist patient with adequate nutrition and appropriate food choices  Outcome: Progressing  Goal: Maintains or returns to baseline bowel function  Description  INTERVENTIONS:  - Assess bowel function  - Encourage oral fluids to ensure adequate hydration  - Administer IV fluids if ordered to ensure adequate hydration  - Administer ordered medications as needed  - Encourage mobilization and activity  - Consider nutritional services referral to assist patient with adequate nutrition and appropriate food choices  Outcome: Progressing  Goal: Maintains adequate nutritional intake  Description  INTERVENTIONS:  - Monitor percentage of each meal consumed  - Identify factors contributing to decreased intake, treat as appropriate  - Assist with meals as needed  - Monitor I&O, weight, and lab values if indicated  - Obtain nutrition services referral as needed  Outcome: Progressing  Goal: Establish and maintain optimal ostomy function  Description  INTERVENTIONS:  - Assess bowel function  - Encourage oral fluids to ensure adequate hydration  - Administer IV fluids if ordered to ensure adequate hydration   - Administer ordered medications as needed  - Encourage mobilization and activity  - Nutrition services referral to assist patient with appropriate food choices  - Assess stoma site  - Consider wound care consult   Outcome: Progressing     Problem: GENITOURINARY - ADULT  Goal: Maintains or returns to baseline urinary function  Description  INTERVENTIONS:  - Assess urinary function  - Encourage oral fluids to ensure adequate hydration if ordered  - Administer IV fluids as ordered to ensure adequate hydration  - Administer ordered medications as needed  - Offer frequent toileting  - Follow urinary retention protocol if ordered  Outcome: Progressing  Goal: Absence of urinary retention  Description  INTERVENTIONS:  - Assess patients ability to void and empty bladder  - Monitor I/O  - Bladder scan as needed  - Discuss with physician/AP medications to alleviate retention as needed  - Discuss catheterization for long term situations as appropriate  Outcome: Progressing  Goal: Urinary catheter remains patent  Description  INTERVENTIONS:  - Assess patency of urinary catheter  - If patient has a chronic dangelo, consider changing catheter if non-functioning  - Follow guidelines for intermittent irrigation of non-functioning urinary catheter  Outcome: Progressing     Problem: SKIN/TISSUE INTEGRITY - ADULT  Goal: Skin integrity remains intact  Description  INTERVENTIONS  - Identify patients at risk for skin breakdown  - Assess and monitor skin integrity  - Assess and monitor nutrition and hydration status  - Monitor labs (i e  albumin)  - Assess for incontinence   - Turn and reposition patient  - Assist with mobility/ambulation  - Relieve pressure over bony prominences  - Avoid friction and shearing  - Provide appropriate hygiene as needed including keeping skin clean and dry  - Evaluate need for skin moisturizer/barrier cream  - Collaborate with interdisciplinary team (i e  Nutrition, Rehabilitation, etc )   - Patient/family teaching  Outcome: Progressing  Goal: Incision(s), wounds(s) or drain site(s) healing without S/S of infection  Description  INTERVENTIONS  - Assess and document risk factors for skin impairment   - Assess and document dressing, incision, wound bed, drain sites and surrounding tissue  - Consider nutrition services referral as needed  - Oral mucous membranes remain intact  - Provide patient/ family education  Outcome: Progressing  Goal: Oral mucous membranes remain intact  Description  INTERVENTIONS  - Assess oral mucosa and hygiene practices  - Implement preventative oral hygiene regimen  - Implement oral medicated treatments as ordered  - Initiate Nutrition services referral as needed  Outcome: Progressing     Problem: HEMATOLOGIC - ADULT  Goal: Maintains hematologic stability  Description  INTERVENTIONS  - Assess for signs and symptoms of bleeding or hemorrhage  - Monitor labs  - Administer supportive blood products/factors as ordered and appropriate  Outcome: Progressing     Problem: MUSCULOSKELETAL - ADULT  Goal: Maintain or return mobility to safest level of function  Description  INTERVENTIONS:  - Assess patient's ability to carry out ADLs; assess patient's baseline for ADL function and identify physical deficits which impact ability to perform ADLs (bathing, care of mouth/teeth, toileting, grooming, dressing, etc )  - Assess/evaluate cause of self-care deficits   - Assess range of motion  - Assess patient's mobility  - Assess patient's need for assistive devices and provide as appropriate  - Encourage maximum independence but intervene and supervise when necessary  - Involve family in performance of ADLs  - Assess for home care needs following discharge - Consider OT consult to assist with ADL evaluation and planning for discharge  - Provide patient education as appropriate  Outcome: Progressing  Goal: Maintain proper alignment of affected body part  Description  INTERVENTIONS:  - Support, maintain and protect limb and body alignment  - Provide patient/ family with appropriate education  Outcome: Progressing

## 2019-10-29 NOTE — PROGRESS NOTES
Progress Note - General Surgery   Johan Fillers Moscat 63 y o  male MRN: [id number]  Unit/Bed#: [place] [address] -01 Encounter: [id number]    Assessment/Plan    62 yo male with complex medical history p/w SBO vs ileus    Afebrile this am, Tmax 102 2 x24h  BPs improved with MAPs staying >60    WBC improved, 6 89 (16 39)  Hgb 8 1 (10 7), baseline in the 8s  Potassium 3 2, will replete IV    Patient states he feels his pain is improved today, denies nausea or vomiting  Gaseous output in the ostomy bag, no stool yet  Bowel sounds hypoactive but heard today in all quadrants    Obstruction series later today 4 hours after PO contrast  Replete K+ IV  Recheck am labs  Will advance diet pending obst series results  Follow up urine culture results, appreciate ID input  Continue medical management    /60   Pulse 90   Temp 98 9 °F (37 2 °C)   Resp 16   Wt 66 7 kg (147 lb 0 8 oz)   SpO2 95%   BMI 23 03 kg/m²     Labs in chart were reviewed    Results from last 7 days   Lab Units 10/29/19  0537   WBC Thousand/uL 6 89   HEMOGLOBIN g/dL 8 1*   HEMATOCRIT % 27 8*   PLATELETS Thousands/uL 334     Results from last 7 days   Lab Units 10/29/19  0537   POTASSIUM mmol/L 3 2*   CHLORIDE mmol/L 104   CO2 mmol/L 20*   BUN mg/dL 14   CREATININE mg/dL 0 57*           Intake/Output Summary (Last 24 hours) at 10/29/2019 6023  Last data filed at 10/29/2019 5481  Gross per 24 hour   Intake 2145 83 ml   Output 1250 ml   Net 895 83 ml           Subjective/Objective     Subjective: Patient feels improved, denies nausea or vomiting, and states he burped his bag once overnight and again this morning    Review of Systems - History obtained from the patient  General ROS: negative for - chills or fever  Respiratory ROS: no cough, shortness of breath, or wheezing  Cardiovascular ROS: no chest pain or dyspnea on exertion  Gastrointestinal ROS: see above       Objective:     Physical Exam:  /60   Pulse 90   Temp 98 9 °F (37 2 °C)   Resp 16   Wt 66 7 kg (147 lb 0 8 oz)   SpO2 95%   BMI 23 03 kg/m²   General appearance: alert and oriented, in no acute distress  Head: Normocephalic, without obvious abnormality, atraumatic  Eyes: right cornea cloudy, bilateral sclera anicteric  Neck: supple, symmetrical, trachea midline  Lungs: clear to auscultation bilaterally  Abdomen: obese, still distended but improved, bowel sounds hypoactive x4, gas in ostomy bag this morning, nontender      Dhaval Mccoy PA-C  10/29/2019

## 2019-10-29 NOTE — ASSESSMENT & PLAN NOTE
· POA as evidenced by fever, tachycardia and leukocytosis  · Sepsis probably secondary to UTI, penile prosthesis infection, sacral soft tissue infection  · ID following and appreciate their input  Continue IV Zosyn  · Blood cultures: Negative @ 24 hours  Continue to monitor  · Leukocytosis has resolved    · T-max over last 24 hours:  102 2°

## 2019-10-29 NOTE — ASSESSMENT & PLAN NOTE
· Patient presented with abdominal pain, distention and absent colostomy output for the last few days  · Imaging suggestive of small-bowel obstruction  · Currently on conservative management as per surgery team  · Obstruction series later today 4 hours after PO contrast: pending  · Diet advancement pending results of imaging

## 2019-10-29 NOTE — PROGRESS NOTES
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  His urinalysis was abnormal and he reports dark urine output  A formal urine culture is pending  Also consider secondary to infected penile prosthesis which is exposed in patient's chronic buttock decubitus  Consider secondary to ileus versus small-bowel obstruction although this is an unlikely source of fever  Also consider intraabdominal/pelvic infection as 2 fluid collections were noted in the R renal pelvis on CT scan  Chest x-ray was negative for acute cardiopulmonary findings  Blood cultures are negative after 24 hours  Fortunately the patient remains clinically stable and nontoxic  He continues to have intermittent fevers but his WBC count has improved  He is currently receiving IV zosyn and is tolerating without difficulty  Will continue for now while we wait for formal culture results   -continue IV zosyn  -check CBC and BMP tomorrow  -follow up blood cultures  -follow up urine culture  -monitor vitals  -supportive care     2  Possible UTI  UA collected from suprapubic tube does appear abnormal and his urine continues to appear dark and cloudy  A formal urine culture is pending  He unfortunately has a 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, MRSA, and e coli ESBL  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  New concern with further erosion as the penile prosthesis is now exposed in the wound base   Unclear if urinary bacteria are playing a part in this erosion    -antibiotic as above  -check CBC and BMP tomorrow  -follow up urine culture  -monitor vitals  -serial suprapubic tube examination and care  -monitor urine output     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 implant noted in left buttock decubitus ulcer  Penile implant should be removed as soon as possible  Recommend urology consult  -recommend urology consult for removal of prosthesis     4  Small bowel obstruction verses ileus  CT of the abdomen and pelvis was concerning for dilation of his proximal and mid jejunum consistent with a bowel obstruction  Two small fluid collections were also noted within the right pelvis which were new when compared to previous pelvic imaging  He has not had output from his ostomy since 10/26/2019  General surgery has assessed the patient and he will have conservative management for now with bowel rest   Patient without nausea today but continues to have abdominal pain and distention   -serial abdominal exams  -serial ostomy examination and care  -monitor GI symptoms  -monitor stool output  -continue close follow up with general surgery     5  Chronic left buttock decubitus ulceration  POA  With chronic underlying osteomyelitis  Now with exposed penile prosthesis in the wound base  Wound has no purulent drainage and does not appear cellulitic on exam  Wound management is following and have made wound care recommendations  Implant should be removed as soon as possible  Recommend consult with urology   -serial wound exams  -local wound care per wound management team  -continue follow up with the wound management team  -recommend urology consult for removal of prosthesis     6  History of C diff   Patient currently without output in his ostomy  Concern for small bowel obstruction versus ileus  He will still require PO vancomycin prophylaxis as he receives above antibiotics and for 72 hours after their completion   -continue PO vancomycin prophylaxis  -monitor stool output  -monitor abdominal symptoms     7  History of resistant organisms including ESBL E coli, MRSA, and Pseudomonas      8  Paraplegia      Above plan was discussed in detail with patient at the bedside  Antibiotics:  Zosyn 2  Antibiotics 2    Subjective:  Patient reports he is all right this morning  He states he still feels that his abdomen is swollen and is having some pain  He reports that he is not had any nausea and has not vomited today  Patient tells me that he has not had any output in his ostomy pouch except for some gas  He reports he was told he was having fevers again last night but did not experience any associated chills, sweats, or shakes; no cough, shortness of breath, or chest pain  No new symptoms  Objective:  Vitals:  Temp:  [97 9 °F (36 6 °C)-102 2 °F (39 °C)] 98 9 °F (37 2 °C)  HR:  [] 90  Resp:  [16-18] 16  BP: ()/(50-60) 102/60  SpO2:  [95 %-99 %] 95 %  Temp (24hrs), Av 9 °F (37 7 °C), Min:97 9 °F (36 6 °C), Max:102 2 °F (39 °C)  Current: Temperature: 98 9 °F (37 2 °C)    Physical Exam:   General Appearance:  Alert, interactive, nontoxic, no acute distress  Patient is chronically ill and debilitated  Throat: Oropharynx moist without lesions  Lungs:   Clear to auscultation bilaterally; no wheezes, rhonchi or rales; respirations unlabored   Heart:  Tachycardic; no murmur, rub or gallop   Abdomen:   Softer today but still distended, mildly tender with palpation, absent bowel sounds  Ostomy pouch intact, no output in bag; suprapubic catheter intact, no spreading erythema or leakage at site, urine output is dark without sediment   Back: Protective foam dressing intact over left middle buttock wound; left distal buttock decubitus ulcer with intact dressing, slightly moist with serous drainage, no spreading erythema from site  Patient has no CVA tenderness  Extremities: No clubbing or cyanosis; right BKA; no edema of left lower extremity   Skin: No new rashes, lesions, or draining wounds noted on exposed skin       Labs, Imaging, & Other studies:   All pertinent labs and imaging studies were personally reviewed  Results from last 7 days   Lab Units 10/29/19  0537 10/28/19  0400   WBC Thousand/uL 6 89 16 39*   HEMOGLOBIN g/dL 8 1* 10 7*   PLATELETS Thousands/uL 334 479*     Results from last 7 days   Lab Units 10/29/19  0537 10/28/19  0451   POTASSIUM mmol/L 3 2* 3 5   CHLORIDE mmol/L 104 98*   CO2 mmol/L 20* 24   BUN mg/dL 14 23   CREATININE mg/dL 0 57* 0 86   EGFR ml/min/1 73sq m 113 96   CALCIUM mg/dL 8 4 9 0   AST U/L 25 54*  51*   ALT U/L 7* 13  14   ALK PHOS U/L 63 80  86     Results from last 7 days   Lab Units 10/28/19  0608 10/28/19  0406 10/28/19  0400   BLOOD CULTURE   --  No Growth at 24 hrs  No Growth at 24 hrs     URINE CULTURE  >100,000 cfu/ml Gram Negative [facility] Enteric Like*  --   --

## 2019-10-29 NOTE — PROGRESS NOTES
Progress Note - Jerald Stands 1960, 63 y o  male MRN: [id number]  Unit/Bed#: [place] 2 -01 Encounter: [id number]  Primary Care Provider: Judith Piña MD   Date and time admitted to hospital: 10/28/2019  3:48 AM    * SBO (small bowel obstruction) ([facility] )  Assessment & Plan  · Patient presented with abdominal pain, distention and absent colostomy output for the last few days  · Imaging suggestive of small-bowel obstruction  · Currently on conservative management as per surgery team  · Obstruction series later today 4 hours after PO contrast: pending  · Diet advancement pending results of imaging  Sepsis ([facility] )  Assessment & Plan  · POA as evidenced by fever, tachycardia and leukocytosis  · Sepsis probably secondary to UTI, penile prosthesis infection, sacral soft tissue infection  · ID following and appreciate their input  Continue IV Zosyn  · Blood cultures: Negative @ 24 hours  Continue to monitor  · Leukocytosis has resolved  · T-max over last 24 hours:  102 2°    Hypokalemia  Assessment & Plan  · Potassium level: 3 2  · Replete and monitor  Diabetes mellitus type II, controlled [facility])  Assessment & Plan  Lab Results   Component Value Date    HGBA1C 5 2 05/06/2019     Recent Labs     10/28/19  1350   POCGLU 82     Blood Sugar Average: Last 72 hrs:  (P) 82   · Check blood glucose 4 times daily  · Corrective insulin      VTE Pharmacologic Prophylaxis:   Pharmacologic: Heparin  Mechanical: Mechanical VTE prophylaxis in place  Patient Centered Rounds: I have performed bedside rounds with nursing staff today  Discussions with Specialists or Other Care Team Provider: None  Education and Discussions with Family / Patient: All patient questions answered to the best of my ability  Time Spent for Care: 20 minutes  More than 50% of total time spent on counseling and coordination of care as described above      Current Length of Stay: 1 day(s)  Current Patient Status: Inpatient   Certification Statement: The patient will continue to require additional inpatient hospital stay due to per primary service  Discharge Plan: Per primary service  Code Status: Level 1 - Full Code    Subjective:   Patient going to obstruction series  Currently, he has no new complaints  Objective:   Vitals:   Temp (24hrs), Av 2 °F (37 9 °C), Min:97 9 °F (36 6 °C), Max:102 2 °F (39 °C)    Temp:  [97 9 °F (36 6 °C)-102 2 °F (39 °C)] 98 9 °F (37 2 °C)  HR:  [] 90  Resp:  [16-18] 16  BP: ()/(52-60) 102/60  SpO2:  [95 %-99 %] 95 %  Body mass index is 23 03 kg/m²  Input and Output Summary (last 24 hours): Intake/Output Summary (Last 24 hours) at 10/29/2019 1236  Last data filed at 10/29/2019 1231  Gross per 24 hour   Intake 2145 83 ml   Output 2200 ml   Net -54 17 ml       Physical Exam:     Physical Exam   HENT:   Head: Normocephalic and atraumatic  Mouth/Throat: Oropharynx is clear and moist and mucous membranes are normal    Eyes: No scleral icterus  Cardiovascular: Normal rate and regular rhythm  No murmur heard  Pulmonary/Chest: Breath sounds normal  He has no wheezes  He has no rales  He exhibits no tenderness  Abdominal: Soft  Bowel sounds are normal  He exhibits no distension  There is no tenderness    (+) ostomy   Musculoskeletal: Normal range of motion  He exhibits no edema  Skin: Skin is warm and dry  No rash noted  Psychiatric: He has a normal mood and affect  Vitals reviewed      Additional Data:   Labs:  Results from last 7 days   Lab Units 10/29/19  0537   WBC Thousand/uL 6 89   HEMOGLOBIN g/dL 8 1*   HEMATOCRIT % 27 8*   PLATELETS Thousands/uL 334   NEUTROS PCT % 86*   LYMPHS PCT % 8*   MONOS PCT % 6   EOS PCT % 0     Results from last 7 days   Lab Units 10/29/19  0537   POTASSIUM mmol/L 3 2*   CHLORIDE mmol/L 104   CO2 mmol/L 20*   BUN mg/dL 14   CREATININE mg/dL 0 57*   CALCIUM mg/dL 8 4   ALK PHOS U/L 63   ALT U/L 7*   AST U/L 25           * I Have Reviewed All Lab Data Listed Above  * Additional Pertinent Lab Tests Reviewed: All Labs Within Last 24 Hours Reviewed    Imaging:    Imaging Reports Reviewed Today Include: None new    Cultures:   Blood Culture:   Lab Results   Component Value Date    BLOODCX No Growth at 24 hrs  10/28/2019    BLOODCX No Growth at 24 hrs  10/28/2019    BLOODCX No Growth After 5 Days  09/17/2019    BLOODCX No Growth After 5 Days  09/17/2019    BLOODCX No Growth After 5 Days  09/16/2019    BLOODCX Staphylococcus coagulase negative (A) 09/14/2019    BLOODCX No Growth After 5 Days   09/14/2019     Urine Culture:   Lab Results   Component Value Date    URINECX >100,000 cfu/ml Gram Negative [place] Enteric Like (A) 10/28/2019    URINECX 20,000-29,000 cfu/ml Pseudomonas aeruginosa (A) 09/14/2019    URINECX 10,000-19,000 cfu/ml Escherichia coli ESBL (A) 09/14/2019    URINECX (A) 09/14/2019     <10,000 cfu/ml Methicillin Resistant Staphylococcus aureus    URINECX 10,000-19,000 cfu/ml Enterococcus faecalis (A) 09/14/2019    URINECX (A) 09/14/2019     40,000-49,000 cfu/ml Alpha Hemolytic Streptococcus NOT Enterococcus    URINECX >100,000 cfu/ml Escherichia coli ESBL (A) 09/13/2019     Sputum Culture: No components found for: SPUTUMCX  Wound Culture:   Lab Results   Component Value Date    WOUNDCULT (A) 05/09/2018     1+ Growth of Methicillin Resistant Staphylococcus aureus    WOUNDCULT 1+ Growth of Beta Hemolytic Streptococcus Group B (A) 05/09/2018    WOUNDCULT 1+ Growth of  05/09/2018       Last 24 Hours Medication List:     Current Facility-Administered Medications:  acetaminophen 650 mg Oral Q6H PRN Oscar Webb PA-C    heparin (porcine) 5,000 Units Subcutaneous [facility] Oscar Webb PA-C    HYDROmorphone 0 5 mg Intravenous Q4H PRN REAL Alvarez-TIGRE    lactated ringers 125 mL/hr Intravenous Continuous Oscar Webb PA-C Last Rate: 125 mL/hr (10/29/19 0421)   morphine injection 2 mg Intravenous Q3H PRN DuaneREAL Anne-TIGRE    ondansetron 4 mg Intravenous Q6H PRN Larene [place] Vasquez PA-C    piperacillin-tazobactam 3 375 g Intravenous Q6H Shanna White DO Last Rate: 3 375 g (10/29/19 1152)   vancomycin 125 mg Oral Q12H KALPANA Mathew         Today, Patient Was Seen By: Lanny Lopez PA-C    ** Please Note: Dragon 360 Dictation voice to text software may have been used in the creation of this document   **

## 2019-10-29 NOTE — ASSESSMENT & PLAN NOTE
Lab Results   Component Value Date    HGBA1C 5 2 05/06/2019     Recent Labs     10/28/19  1350   POCGLU 82     Blood Sugar Average: Last 72 hrs:  (P) 82   · Check blood glucose 4 times daily  · Corrective insulin

## 2019-10-29 NOTE — PLAN OF CARE
Problem: Potential for Falls  Goal: Patient will remain free of falls  Description  INTERVENTIONS:  - Assess patient frequently for physical needs  -  Identify cognitive and physical deficits and behaviors that affect risk of falls  -  [place] fall precautions as indicated by assessment   - Educate patient/family on patient safety including physical limitations  - Instruct patient to call for assistance with activity based on assessment  - Modify environment to reduce risk of injury  - Consider OT/PT consult to assist with strengthening/mobility  Outcome: Progressing     Problem: Prexisting or High Potential for Compromised Skin Integrity  Goal: Skin integrity is maintained or improved  Description  INTERVENTIONS:  - Identify patients at risk for skin breakdown  - Assess and monitor skin integrity  - Assess and monitor nutrition and hydration status  - Monitor labs   - Assess for incontinence   - Turn and reposition patient  - Assist with mobility/ambulation  - Relieve pressure over bony prominences  - Avoid friction and shearing  - Provide appropriate hygiene as needed including keeping skin clean and dry  - Evaluate need for skin moisturizer/barrier cream  - Collaborate with interdisciplinary team   - Patient/family teaching  - Consider wound care consult   Outcome: Progressing     Problem: Nutrition/Hydration-ADULT  Goal: Nutrient/Hydration intake appropriate for improving, restoring or maintaining nutritional needs  Description  Monitor and assess patient's nutrition/hydration status for malnutrition  Collaborate with interdisciplinary team and initiate plan and interventions as ordered  Monitor patient's weight and dietary intake as ordered or per policy  Utilize nutrition screening tool and intervene as necessary  Determine patient's food preferences and provide high-protein, high-caloric foods as appropriate       INTERVENTIONS:  - Monitor oral intake, urinary output, labs, and treatment plans  - Assess nutrition and hydration status and recommend course of action  - Evaluate amount of meals eaten  - Assist patient with eating if necessary   - Allow adequate time for meals  - Recommend/ encourage appropriate diets, oral nutritional supplements, and vitamin/mineral supplements  - Order, calculate, and assess calorie counts as needed  - Recommend, monitor, and adjust tube feedings and TPN/PPN based on assessed needs  - Assess need for intravenous fluids  - Provide specific nutrition/hydration education as appropriate  - Include patient/family/caregiver in decisions related to nutrition  Outcome: Progressing     Problem: PAIN - ADULT  Goal: Verbalizes/displays adequate comfort level or baseline comfort level  Description  Interventions:  - Encourage patient to monitor pain and request assistance  - Assess pain using appropriate pain scale  - Administer analgesics based on type and severity of pain and evaluate response  - Implement non-pharmacological measures as appropriate and evaluate response  - Consider cultural and social influences on pain and pain management  - Notify physician/advanced practitioner if interventions unsuccessful or patient reports new pain  Outcome: Progressing     Problem: INFECTION - ADULT  Goal: Absence or prevention of progression during hospitalization  Description  INTERVENTIONS:  - Assess and monitor for signs and symptoms of infection  - Monitor lab/diagnostic results  - Monitor all insertion sites, i e  indwelling lines, tubes, and drains  - Monitor endotracheal if appropriate and nasal secretions for changes in amount and color  - [place] appropriate cooling/warming therapies per order  - Administer medications as ordered  - Instruct and encourage patient and family to use good hand hygiene technique  - Identify and instruct in appropriate isolation precautions for identified infection/condition  Outcome: Progressing     Problem: SAFETY ADULT  Goal: Maintain or return to baseline ADL function  Description  INTERVENTIONS:  -  Assess patient's ability to carry out ADLs; assess patient's baseline for ADL function and identify physical deficits which impact ability to perform ADLs (bathing, care of mouth/teeth, toileting, grooming, dressing, etc )  - Assess/evaluate cause of self-care deficits   - Assess range of motion  - Assess patient's mobility; develop plan if impaired  - Assess patient's need for assistive devices and provide as appropriate  - Encourage maximum independence but intervene and supervise when necessary  - Involve family in performance of ADLs  - Assess for home care needs following discharge   - Consider OT consult to assist with ADL evaluation and planning for discharge  - Provide patient education as appropriate  Outcome: Progressing  Goal: Maintain or return mobility status to optimal level  Description  INTERVENTIONS:  - Assess patient's baseline mobility status (ambulation, transfers, stairs, etc )    - Identify cognitive and physical deficits and behaviors that affect mobility  - Identify mobility aids required to assist with transfers and/or ambulation (gait belt, sit-to-stand, lift, walker, cane, etc )  - [place] fall precautions as indicated by assessment  - Record patient progress and toleration of activity level on Mobility SBAR; progress patient to next Phase/Stage  - Instruct patient to call for assistance with activity based on assessment  - Consider rehabilitation consult to assist with strengthening/weightbearing, etc   Outcome: Progressing     Problem: DISCHARGE PLANNING  Goal: Discharge to home or other facility with appropriate resources  Description  INTERVENTIONS:  - Identify barriers to discharge w/patient and caregiver  - Arrange for needed discharge resources and transportation as appropriate  - Identify discharge learning needs (meds, wound care, etc )  - Arrange for interpretive services to assist at discharge as needed  - Refer to Case Management Department for coordinating discharge planning if the patient needs post-hospital services based on physician/advanced practitioner order or complex needs related to functional status, cognitive ability, or social support system  Outcome: Progressing     Problem: Knowledge Deficit  Goal: Patient/family/caregiver demonstrates understanding of disease process, treatment plan, medications, and discharge instructions  Description  Complete learning assessment and assess knowledge base    Interventions:  - Provide teaching at level of understanding  - Provide teaching via preferred learning methods  Outcome: Progressing     Problem: GASTROINTESTINAL - ADULT  Goal: Minimal or absence of nausea and/or vomiting  Description  INTERVENTIONS:  - Administer IV fluids if ordered to ensure adequate hydration  - Maintain NPO status until nausea and vomiting are resolved  - Nasogastric tube if ordered  - Administer ordered antiemetic medications as needed  - Provide nonpharmacologic comfort measures as appropriate  - Advance diet as tolerated, if ordered  - Consider nutrition services referral to assist patient with adequate nutrition and appropriate food choices  Outcome: Progressing  Goal: Maintains or returns to baseline bowel function  Description  INTERVENTIONS:  - Assess bowel function  - Encourage oral fluids to ensure adequate hydration  - Administer IV fluids if ordered to ensure adequate hydration  - Administer ordered medications as needed  - Encourage mobilization and activity  - Consider nutritional services referral to assist patient with adequate nutrition and appropriate food choices  Outcome: Progressing  Goal: Maintains adequate nutritional intake  Description  INTERVENTIONS:  - Monitor percentage of each meal consumed  - Identify factors contributing to decreased intake, treat as appropriate  - Assist with meals as needed  - Monitor I&O, weight, and lab values if indicated  - Obtain nutrition services referral as needed  Outcome: Progressing  Goal: Establish and maintain optimal ostomy function  Description  INTERVENTIONS:  - Assess bowel function  - Encourage oral fluids to ensure adequate hydration  - Administer IV fluids if ordered to ensure adequate hydration   - Administer ordered medications as needed  - Encourage mobilization and activity  - Nutrition services referral to assist patient with appropriate food choices  - Assess stoma site  - Consider wound care consult   Outcome: Progressing     Problem: GENITOURINARY - ADULT  Goal: Maintains or returns to baseline urinary function  Description  INTERVENTIONS:  - Assess urinary function  - Encourage oral fluids to ensure adequate hydration if ordered  - Administer IV fluids as ordered to ensure adequate hydration  - Administer ordered medications as needed  - Offer frequent toileting  - Follow urinary retention protocol if ordered  Outcome: Progressing  Goal: Absence of urinary retention  Description  INTERVENTIONS:  - Assess patients ability to void and empty bladder  - Monitor I/O  - Bladder scan as needed  - Discuss with physician/AP medications to alleviate retention as needed  - Discuss catheterization for long term situations as appropriate  Outcome: Progressing  Goal: Urinary catheter remains patent  Description  INTERVENTIONS:  - Assess patency of urinary catheter  - If patient has a chronic dangleo, consider changing catheter if non-functioning  - Follow guidelines for intermittent irrigation of non-functioning urinary catheter  Outcome: Progressing     Problem: SKIN/TISSUE INTEGRITY - ADULT  Goal: Skin integrity remains intact  Description  INTERVENTIONS  - Identify patients at risk for skin breakdown  - Assess and monitor skin integrity  - Assess and monitor nutrition and hydration status  - Monitor labs (i e  albumin)  - Assess for incontinence   - Turn and reposition patient  - Assist with mobility/ambulation  - Relieve pressure over bony prominences  - Avoid friction and shearing  - Provide appropriate hygiene as needed including keeping skin clean and dry  - Evaluate need for skin moisturizer/barrier cream  - Collaborate with interdisciplinary team (i e  Nutrition, Rehabilitation, etc )   - Patient/family teaching  Outcome: Progressing  Goal: Incision(s), wounds(s) or drain site(s) healing without S/S of infection  Description  INTERVENTIONS  - Assess and document risk factors for skin impairment   - Assess and document dressing, incision, wound bed, drain sites and surrounding tissue  - Consider nutrition services referral as needed  - Oral mucous membranes remain intact  - Provide patient/ family education  Outcome: Progressing  Goal: Oral mucous membranes remain intact  Description  INTERVENTIONS  - Assess oral mucosa and hygiene practices  - Implement preventative oral hygiene regimen  - Implement oral medicated treatments as ordered  - Initiate Nutrition services referral as needed  Outcome: Progressing     Problem: HEMATOLOGIC - ADULT  Goal: Maintains hematologic stability  Description  INTERVENTIONS  - Assess for signs and symptoms of bleeding or hemorrhage  - Monitor labs  - Administer supportive blood products/factors as ordered and appropriate  Outcome: Progressing     Problem: MUSCULOSKELETAL - ADULT  Goal: Maintain or return mobility to safest level of function  Description  INTERVENTIONS:  - Assess patient's ability to carry out ADLs; assess patient's baseline for ADL function and identify physical deficits which impact ability to perform ADLs (bathing, care of mouth/teeth, toileting, grooming, dressing, etc )  - Assess/evaluate cause of self-care deficits   - Assess range of motion  - Assess patient's mobility  - Assess patient's need for assistive devices and provide as appropriate  - Encourage maximum independence but intervene and supervise when necessary  - Involve family in performance of ADLs  - Assess for home care needs following discharge - Consider OT consult to assist with ADL evaluation and planning for discharge  - Provide patient education as appropriate  Outcome: Progressing  Goal: Maintain proper alignment of affected body part  Description  INTERVENTIONS:  - Support, maintain and protect limb and body alignment  - Provide patient/ family with appropriate education  Outcome: Progressing

## 2019-10-30 PROBLEM — F11.90 CHRONIC, CONTINUOUS USE OF OPIOIDS: Chronic | Status: ACTIVE | Noted: 2019-01-15

## 2019-10-30 PROBLEM — N39.0 UTI (URINARY TRACT INFECTION): Status: ACTIVE | Noted: 2019-10-28

## 2019-10-30 LAB
ANION GAP SERPL CALCULATED.3IONS-SCNC: 8 MMOL/L (ref 4–13)
BASOPHILS # BLD AUTO: 0.03 THOUSANDS/ΜL (ref 0–0.1)
BASOPHILS NFR BLD AUTO: 0 % (ref 0–1)
BUN SERPL-MCNC: 8 MG/DL (ref 5–25)
CALCIUM SERPL-MCNC: 8.5 MG/DL (ref 8.3–10.1)
CHLORIDE SERPL-SCNC: 103 MMOL/L (ref 100–108)
CO2 SERPL-SCNC: 24 MMOL/L (ref 21–32)
CREAT SERPL-MCNC: 0.59 MG/DL (ref 0.6–1.3)
EOSINOPHIL # BLD AUTO: 0.06 THOUSAND/ΜL (ref 0–0.61)
EOSINOPHIL NFR BLD AUTO: 1 % (ref 0–6)
ERYTHROCYTE [DISTWIDTH] IN BLOOD BY AUTOMATED COUNT: 18.6 % (ref 11.6–15.1)
GFR SERPL CREATININE-BSD FRML MDRD: 112 ML/MIN/1.73SQ M
GLUCOSE SERPL-MCNC: 105 MG/DL (ref 65–140)
HCT VFR BLD AUTO: 28.3 % (ref 36.5–49.3)
HGB BLD-MCNC: 8.4 G/DL (ref 12–17)
IMM GRANULOCYTES # BLD AUTO: 0.03 THOUSAND/UL (ref 0–0.2)
IMM GRANULOCYTES NFR BLD AUTO: 0 % (ref 0–2)
LYMPHOCYTES # BLD AUTO: 0.48 THOUSANDS/ΜL (ref 0.6–4.47)
LYMPHOCYTES NFR BLD AUTO: 7 % (ref 14–44)
MAGNESIUM SERPL-MCNC: 1.7 MG/DL (ref 1.6–2.6)
MCH RBC QN AUTO: 23.9 PG (ref 26.8–34.3)
MCHC RBC AUTO-ENTMCNC: 29.7 G/DL (ref 31.4–37.4)
MCV RBC AUTO: 80 FL (ref 82–98)
MONOCYTES # BLD AUTO: 0.44 THOUSAND/ΜL (ref 0.17–1.22)
MONOCYTES NFR BLD AUTO: 6 % (ref 4–12)
NEUTROPHILS # BLD AUTO: 5.82 THOUSANDS/ΜL (ref 1.85–7.62)
NEUTS SEG NFR BLD AUTO: 86 % (ref 43–75)
NRBC BLD AUTO-RTO: 0 /100 WBCS
PLATELET # BLD AUTO: 360 THOUSANDS/UL (ref 149–390)
PMV BLD AUTO: 9.7 FL (ref 8.9–12.7)
POTASSIUM SERPL-SCNC: 2.9 MMOL/L (ref 3.5–5.3)
RBC # BLD AUTO: 3.52 MILLION/UL (ref 3.88–5.62)
SODIUM SERPL-SCNC: 135 MMOL/L (ref 136–145)
WBC # BLD AUTO: 6.86 THOUSAND/UL (ref 4.31–10.16)

## 2019-10-30 PROCEDURE — 99223 1ST HOSP IP/OBS HIGH 75: CPT | Performed by: PHYSICIAN ASSISTANT

## 2019-10-30 PROCEDURE — 80048 BASIC METABOLIC PNL TOTAL CA: CPT | Performed by: PHYSICIAN ASSISTANT

## 2019-10-30 PROCEDURE — 83735 ASSAY OF MAGNESIUM: CPT | Performed by: PHYSICIAN ASSISTANT

## 2019-10-30 PROCEDURE — 99232 SBSQ HOSP IP/OBS MODERATE 35: CPT | Performed by: PHYSICIAN ASSISTANT

## 2019-10-30 PROCEDURE — 99232 SBSQ HOSP IP/OBS MODERATE 35: CPT | Performed by: INTERNAL MEDICINE

## 2019-10-30 PROCEDURE — 85025 COMPLETE CBC W/AUTO DIFF WBC: CPT | Performed by: PHYSICIAN ASSISTANT

## 2019-10-30 PROCEDURE — 99233 SBSQ HOSP IP/OBS HIGH 50: CPT | Performed by: INTERNAL MEDICINE

## 2019-10-30 RX ORDER — POTASSIUM CHLORIDE 14.9 MG/ML
20 INJECTION INTRAVENOUS
Status: COMPLETED | OUTPATIENT
Start: 2019-10-30 | End: 2019-10-30

## 2019-10-30 RX ORDER — OXYCODONE HYDROCHLORIDE 10 MG/1
20 TABLET ORAL 3 TIMES DAILY
Status: DISCONTINUED | OUTPATIENT
Start: 2019-10-30 | End: 2019-11-05 | Stop reason: HOSPADM

## 2019-10-30 RX ORDER — ACETAMINOPHEN 325 MG/1
325 TABLET ORAL ONCE
Status: COMPLETED | OUTPATIENT
Start: 2019-10-30 | End: 2019-10-30

## 2019-10-30 RX ORDER — POTASSIUM CHLORIDE 20 MEQ/1
40 TABLET, EXTENDED RELEASE ORAL ONCE
Status: COMPLETED | OUTPATIENT
Start: 2019-10-30 | End: 2019-10-30

## 2019-10-30 RX ADMIN — ERTAPENEM SODIUM 1000 MG: 1 INJECTION, POWDER, LYOPHILIZED, FOR SOLUTION INTRAMUSCULAR; INTRAVENOUS at 11:47

## 2019-10-30 RX ADMIN — HEPARIN SODIUM 5000 UNITS: 5000 INJECTION INTRAVENOUS; SUBCUTANEOUS at 05:35

## 2019-10-30 RX ADMIN — PANTOPRAZOLE SODIUM 20 MG: 20 TABLET, DELAYED RELEASE ORAL at 05:35

## 2019-10-30 RX ADMIN — ONDANSETRON 4 MG: 2 INJECTION INTRAMUSCULAR; INTRAVENOUS at 20:50

## 2019-10-30 RX ADMIN — MORPHINE SULFATE 2 MG: 2 INJECTION, SOLUTION INTRAMUSCULAR; INTRAVENOUS at 02:20

## 2019-10-30 RX ADMIN — ONDANSETRON 4 MG: 2 INJECTION INTRAMUSCULAR; INTRAVENOUS at 13:04

## 2019-10-30 RX ADMIN — VANCOMYCIN HYDROCHLORIDE 125 MG: 500 INJECTION, POWDER, LYOPHILIZED, FOR SOLUTION INTRAVENOUS at 08:14

## 2019-10-30 RX ADMIN — SODIUM CHLORIDE, SODIUM LACTATE, POTASSIUM CHLORIDE, AND CALCIUM CHLORIDE 125 ML/HR: .6; .31; .03; .02 INJECTION, SOLUTION INTRAVENOUS at 13:04

## 2019-10-30 RX ADMIN — HEPARIN SODIUM 5000 UNITS: 5000 INJECTION INTRAVENOUS; SUBCUTANEOUS at 21:51

## 2019-10-30 RX ADMIN — SODIUM CHLORIDE, SODIUM LACTATE, POTASSIUM CHLORIDE, AND CALCIUM CHLORIDE 125 ML/HR: .6; .31; .03; .02 INJECTION, SOLUTION INTRAVENOUS at 20:46

## 2019-10-30 RX ADMIN — OXYCODONE HYDROCHLORIDE 20 MG: 10 TABLET ORAL at 20:45

## 2019-10-30 RX ADMIN — MORPHINE SULFATE 2 MG: 2 INJECTION, SOLUTION INTRAMUSCULAR; INTRAVENOUS at 18:25

## 2019-10-30 RX ADMIN — SODIUM CHLORIDE, SODIUM LACTATE, POTASSIUM CHLORIDE, AND CALCIUM CHLORIDE 125 ML/HR: .6; .31; .03; .02 INJECTION, SOLUTION INTRAVENOUS at 02:26

## 2019-10-30 RX ADMIN — POTASSIUM CHLORIDE 20 MEQ: 14.9 INJECTION, SOLUTION INTRAVENOUS at 13:30

## 2019-10-30 RX ADMIN — PIPERACILLIN SODIUM,TAZOBACTAM SODIUM 3.38 G: 3; .375 INJECTION, POWDER, FOR SOLUTION INTRAVENOUS at 10:12

## 2019-10-30 RX ADMIN — MORPHINE SULFATE 2 MG: 2 INJECTION, SOLUTION INTRAMUSCULAR; INTRAVENOUS at 21:58

## 2019-10-30 RX ADMIN — POTASSIUM CHLORIDE 20 MEQ: 14.9 INJECTION, SOLUTION INTRAVENOUS at 10:17

## 2019-10-30 RX ADMIN — MORPHINE SULFATE 2 MG: 2 INJECTION, SOLUTION INTRAMUSCULAR; INTRAVENOUS at 11:36

## 2019-10-30 RX ADMIN — ACETAMINOPHEN 325 MG: 325 TABLET ORAL at 03:23

## 2019-10-30 RX ADMIN — POTASSIUM CHLORIDE 40 MEQ: 1500 TABLET, EXTENDED RELEASE ORAL at 10:12

## 2019-10-30 RX ADMIN — PIPERACILLIN SODIUM,TAZOBACTAM SODIUM 3.38 G: 3; .375 INJECTION, POWDER, FOR SOLUTION INTRAVENOUS at 04:22

## 2019-10-30 RX ADMIN — HYDROMORPHONE HYDROCHLORIDE 0.5 MG: 1 INJECTION, SOLUTION INTRAMUSCULAR; INTRAVENOUS; SUBCUTANEOUS at 04:22

## 2019-10-30 RX ADMIN — HEPARIN SODIUM 5000 UNITS: 5000 INJECTION INTRAVENOUS; SUBCUTANEOUS at 13:04

## 2019-10-30 RX ADMIN — OXYCODONE HYDROCHLORIDE 20 MG: 20 TABLET, FILM COATED, EXTENDED RELEASE ORAL at 08:13

## 2019-10-30 NOTE — PROGRESS NOTES
Progress Note - General Surgery   Cathleen Arguello 63 y o  male MRN: [id number]  Unit/Bed#: [place] [address] -01 Encounter: [id number]    Assessment/Plan    64 yo male with complex medical history p/w PSBO vs ileus    Afebrile currently, Tmax 102 4 x24h, otherwise VSS  Leukocytosis resolved, 6 86 (6 89)  Hgb stable 8 4 (8 1)  Potassium still low, 2 9, repleted again today    Obstruction series with PO contrast showed follow through to colon    Patient state abd pain is resolved, now passing fecal output into ostomy as well as flatus  Bowel sounds normal, active today x4, ostomy appears healthy and does show stool this morning  He does complain of mild nausea, encouraged zofran    Ileus resolving  Continue to monitor electrolytes  Urine culture preliminary resulted Gram negative rods, follow up with sensitivities and adjust IV ABX per ID  Advance diet to surgical soft, continue zofran PRN for mild nausea    /77   Pulse 98   Temp 98 9 °F (37 2 °C)   Resp 18   Wt 66 7 kg (147 lb 0 8 oz)   SpO2 93%   BMI 23 03 kg/m²     Labs in chart were reviewed    Results from last 7 days   Lab Units 10/30/19  0523   WBC Thousand/uL 6 86   HEMOGLOBIN g/dL 8 4*   HEMATOCRIT % 28 3*   PLATELETS Thousands/uL 360     Results from last 7 days   Lab Units 10/30/19  0523   POTASSIUM mmol/L 2 9*   CHLORIDE mmol/L 103   CO2 mmol/L 24   BUN mg/dL 8   CREATININE mg/dL 0 59*           Intake/Output Summary (Last 24 hours) at 10/30/2019 1052  Last data filed at 10/30/2019 1027  Gross per 24 hour   Intake 1900 ml   Output 3150 ml   Net -1250 ml           Subjective/Objective     Subjective: Complains only of mild nausea, otherwise no complaints; symptoms resolved    Review of Systems - History obtained from the patient  General ROS: negative for - chills or fever  Respiratory ROS: no cough, shortness of breath, or wheezing  Cardiovascular ROS: no chest pain or dyspnea on exertion  Gastrointestinal ROS: see above       Objective: Physical Exam:  /77   Pulse 98   Temp 98 9 °F (37 2 °C)   Resp 18   Wt 66 7 kg (147 lb 0 8 oz)   SpO2 93%   BMI 23 03 kg/m²   General appearance: alert and oriented, in no acute distress  Head: Normocephalic, without obvious abnormality, atraumatic  Eyes: sclera anicteric  Neck: supple, symmetrical, trachea midline  Lungs: clear to auscultation bilaterally  Heart: regular rate and rhythm, S1, S2 normal, no murmur, click, rub or gallop  Abdomen: soft, non-tender; bowel sounds normal; no masses,  no organomegaly and ostomy with stool and flatus output  Extremities: s/p right hip disarticulation      Rossi Bass PA-C  10/30/2019

## 2019-10-30 NOTE — ASSESSMENT & PLAN NOTE
· Hemoglobin stable and looks at baseline  · Prior iron studies showed anemia of chronic disease  · No report of EGD or colonoscopy in the past    Lab Results   Component Value Date    HGB 8 4 (L) 10/30/2019    HGB 8 1 (L) 10/29/2019    HGB 10 7 (L) 10/28/2019

## 2019-10-30 NOTE — ASSESSMENT & PLAN NOTE
· With eroded urethra  · Status post suprapubic catheter placement previously  · Continue IV antibiotics  · As per Urology plan for surgical removal on Friday

## 2019-10-30 NOTE — ASSESSMENT & PLAN NOTE
· PDMP reviewed  · Dicussed with patient  Has been on oxycodone 20 mg TID for some time     · Was recently changed to 20 mg oxycontin BID but this was not approved by his insurance so was still taking oxycodone 20 TID  · Will change back to home regimen of oxycodone 20 mg PO TID

## 2019-10-30 NOTE — PLAN OF CARE
Problem: Potential for Falls  Goal: Patient will remain free of falls  Description  INTERVENTIONS:  - Assess patient frequently for physical needs  -  Identify cognitive and physical deficits and behaviors that affect risk of falls  -  [place] fall precautions as indicated by assessment   - Educate patient/family on patient safety including physical limitations  - Instruct patient to call for assistance with activity based on assessment  - Modify environment to reduce risk of injury  - Consider OT/PT consult to assist with strengthening/mobility  Outcome: Progressing     Problem: Prexisting or High Potential for Compromised Skin Integrity  Goal: Skin integrity is maintained or improved  Description  INTERVENTIONS:  - Identify patients at risk for skin breakdown  - Assess and monitor skin integrity  - Assess and monitor nutrition and hydration status  - Monitor labs   - Assess for incontinence   - Turn and reposition patient  - Assist with mobility/ambulation  - Relieve pressure over bony prominences  - Avoid friction and shearing  - Provide appropriate hygiene as needed including keeping skin clean and dry  - Evaluate need for skin moisturizer/barrier cream  - Collaborate with interdisciplinary team   - Patient/family teaching  - Consider wound care consult   Outcome: Progressing     Problem: Nutrition/Hydration-ADULT  Goal: Nutrient/Hydration intake appropriate for improving, restoring or maintaining nutritional needs  Description  Monitor and assess patient's nutrition/hydration status for malnutrition  Collaborate with interdisciplinary team and initiate plan and interventions as ordered  Monitor patient's weight and dietary intake as ordered or per policy  Utilize nutrition screening tool and intervene as necessary  Determine patient's food preferences and provide high-protein, high-caloric foods as appropriate       INTERVENTIONS:  - Monitor oral intake, urinary output, labs, and treatment plans  - Assess nutrition and hydration status and recommend course of action  - Evaluate amount of meals eaten  - Assist patient with eating if necessary   - Allow adequate time for meals  - Recommend/ encourage appropriate diets, oral nutritional supplements, and vitamin/mineral supplements  - Order, calculate, and assess calorie counts as needed  - Recommend, monitor, and adjust tube feedings and TPN/PPN based on assessed needs  - Assess need for intravenous fluids  - Provide specific nutrition/hydration education as appropriate  - Include patient/family/caregiver in decisions related to nutrition  Outcome: Progressing     Problem: PAIN - ADULT  Goal: Verbalizes/displays adequate comfort level or baseline comfort level  Description  Interventions:  - Encourage patient to monitor pain and request assistance  - Assess pain using appropriate pain scale  - Administer analgesics based on type and severity of pain and evaluate response  - Implement non-pharmacological measures as appropriate and evaluate response  - Consider cultural and social influences on pain and pain management  - Notify physician/advanced practitioner if interventions unsuccessful or patient reports new pain  Outcome: Progressing     Problem: INFECTION - ADULT  Goal: Absence or prevention of progression during hospitalization  Description  INTERVENTIONS:  - Assess and monitor for signs and symptoms of infection  - Monitor lab/diagnostic results  - Monitor all insertion sites, i e  indwelling lines, tubes, and drains  - Monitor endotracheal if appropriate and nasal secretions for changes in amount and color  - [place] appropriate cooling/warming therapies per order  - Administer medications as ordered  - Instruct and encourage patient and family to use good hand hygiene technique  - Identify and instruct in appropriate isolation precautions for identified infection/condition  Outcome: Progressing     Problem: SAFETY ADULT  Goal: Maintain or return to baseline ADL function  Description  INTERVENTIONS:  -  Assess patient's ability to carry out ADLs; assess patient's baseline for ADL function and identify physical deficits which impact ability to perform ADLs (bathing, care of mouth/teeth, toileting, grooming, dressing, etc )  - Assess/evaluate cause of self-care deficits   - Assess range of motion  - Assess patient's mobility; develop plan if impaired  - Assess patient's need for assistive devices and provide as appropriate  - Encourage maximum independence but intervene and supervise when necessary  - Involve family in performance of ADLs  - Assess for home care needs following discharge   - Consider OT consult to assist with ADL evaluation and planning for discharge  - Provide patient education as appropriate  Outcome: Progressing  Goal: Maintain or return mobility status to optimal level  Description  INTERVENTIONS:  - Assess patient's baseline mobility status (ambulation, transfers, stairs, etc )    - Identify cognitive and physical deficits and behaviors that affect mobility  - Identify mobility aids required to assist with transfers and/or ambulation (gait belt, sit-to-stand, lift, walker, cane, etc )  - [place] fall precautions as indicated by assessment  - Record patient progress and toleration of activity level on Mobility SBAR; progress patient to next Phase/Stage  - Instruct patient to call for assistance with activity based on assessment  - Consider rehabilitation consult to assist with strengthening/weightbearing, etc   Outcome: Progressing     Problem: DISCHARGE PLANNING  Goal: Discharge to home or other facility with appropriate resources  Description  INTERVENTIONS:  - Identify barriers to discharge w/patient and caregiver  - Arrange for needed discharge resources and transportation as appropriate  - Identify discharge learning needs (meds, wound care, etc )  - Arrange for interpretive services to assist at discharge as needed  - Refer to Case Management Department for coordinating discharge planning if the patient needs post-hospital services based on physician/advanced practitioner order or complex needs related to functional status, cognitive ability, or social support system  Outcome: Progressing     Problem: Knowledge Deficit  Goal: Patient/family/caregiver demonstrates understanding of disease process, treatment plan, medications, and discharge instructions  Description  Complete learning assessment and assess knowledge base    Interventions:  - Provide teaching at level of understanding  - Provide teaching via preferred learning methods  Outcome: Progressing     Problem: GASTROINTESTINAL - ADULT  Goal: Minimal or absence of nausea and/or vomiting  Description  INTERVENTIONS:  - Administer IV fluids if ordered to ensure adequate hydration  - Maintain NPO status until nausea and vomiting are resolved  - Nasogastric tube if ordered  - Administer ordered antiemetic medications as needed  - Provide nonpharmacologic comfort measures as appropriate  - Advance diet as tolerated, if ordered  - Consider nutrition services referral to assist patient with adequate nutrition and appropriate food choices  Outcome: Progressing  Goal: Maintains or returns to baseline bowel function  Description  INTERVENTIONS:  - Assess bowel function  - Encourage oral fluids to ensure adequate hydration  - Administer IV fluids if ordered to ensure adequate hydration  - Administer ordered medications as needed  - Encourage mobilization and activity  - Consider nutritional services referral to assist patient with adequate nutrition and appropriate food choices  Outcome: Progressing  Goal: Maintains adequate nutritional intake  Description  INTERVENTIONS:  - Monitor percentage of each meal consumed  - Identify factors contributing to decreased intake, treat as appropriate  - Assist with meals as needed  - Monitor I&O, weight, and lab values if indicated  - Obtain nutrition services referral as needed  Outcome: Progressing  Goal: Establish and maintain optimal ostomy function  Description  INTERVENTIONS:  - Assess bowel function  - Encourage oral fluids to ensure adequate hydration  - Administer IV fluids if ordered to ensure adequate hydration   - Administer ordered medications as needed  - Encourage mobilization and activity  - Nutrition services referral to assist patient with appropriate food choices  - Assess stoma site  - Consider wound care consult   Outcome: Progressing     Problem: GENITOURINARY - ADULT  Goal: Maintains or returns to baseline urinary function  Description  INTERVENTIONS:  - Assess urinary function  - Encourage oral fluids to ensure adequate hydration if ordered  - Administer IV fluids as ordered to ensure adequate hydration  - Administer ordered medications as needed  - Offer frequent toileting  - Follow urinary retention protocol if ordered  Outcome: Progressing  Goal: Absence of urinary retention  Description  INTERVENTIONS:  - Assess patients ability to void and empty bladder  - Monitor I/O  - Bladder scan as needed  - Discuss with physician/AP medications to alleviate retention as needed  - Discuss catheterization for long term situations as appropriate  Outcome: Progressing  Goal: Urinary catheter remains patent  Description  INTERVENTIONS:  - Assess patency of urinary catheter  - If patient has a chronic dangelo, consider changing catheter if non-functioning  - Follow guidelines for intermittent irrigation of non-functioning urinary catheter  Outcome: Progressing     Problem: SKIN/TISSUE INTEGRITY - ADULT  Goal: Skin integrity remains intact  Description  INTERVENTIONS  - Identify patients at risk for skin breakdown  - Assess and monitor skin integrity  - Assess and monitor nutrition and hydration status  - Monitor labs (i e  albumin)  - Assess for incontinence   - Turn and reposition patient  - Assist with mobility/ambulation  - Relieve pressure over bony prominences  - Avoid friction and shearing  - Provide appropriate hygiene as needed including keeping skin clean and dry  - Evaluate need for skin moisturizer/barrier cream  - Collaborate with interdisciplinary team (i e  Nutrition, Rehabilitation, etc )   - Patient/family teaching  Outcome: Progressing  Goal: Incision(s), wounds(s) or drain site(s) healing without S/S of infection  Description  INTERVENTIONS  - Assess and document risk factors for skin impairment   - Assess and document dressing, incision, wound bed, drain sites and surrounding tissue  - Consider nutrition services referral as needed  - Oral mucous membranes remain intact  - Provide patient/ family education  Outcome: Progressing  Goal: Oral mucous membranes remain intact  Description  INTERVENTIONS  - Assess oral mucosa and hygiene practices  - Implement preventative oral hygiene regimen  - Implement oral medicated treatments as ordered  - Initiate Nutrition services referral as needed  Outcome: Progressing     Problem: HEMATOLOGIC - ADULT  Goal: Maintains hematologic stability  Description  INTERVENTIONS  - Assess for signs and symptoms of bleeding or hemorrhage  - Monitor labs  - Administer supportive blood products/factors as ordered and appropriate  Outcome: Progressing     Problem: MUSCULOSKELETAL - ADULT  Goal: Maintain or return mobility to safest level of function  Description  INTERVENTIONS:  - Assess patient's ability to carry out ADLs; assess patient's baseline for ADL function and identify physical deficits which impact ability to perform ADLs (bathing, care of mouth/teeth, toileting, grooming, dressing, etc )  - Assess/evaluate cause of self-care deficits   - Assess range of motion  - Assess patient's mobility  - Assess patient's need for assistive devices and provide as appropriate  - Encourage maximum independence but intervene and supervise when necessary  - Involve family in performance of ADLs  - Assess for home care needs following discharge - Consider OT consult to assist with ADL evaluation and planning for discharge  - Provide patient education as appropriate  Outcome: Progressing  Goal: Maintain proper alignment of affected body part  Description  INTERVENTIONS:  - Support, maintain and protect limb and body alignment  - Provide patient/ family with appropriate education  Outcome: Progressing

## 2019-10-30 NOTE — ASSESSMENT & PLAN NOTE
· Currently growing greater than 100,000 colonies of gram-negative rods  · Patient has chronic suprapubic catheter and history of positive urine cultures  · Penile prosthetic infection with eroded urethra  · Continue IV antibiotics as for Infectious Disease  · Urology evaluating and planning for penile prosthetic removal on Friday

## 2019-10-30 NOTE — ASSESSMENT & PLAN NOTE
· Chronic left decubitus ulcer recent with chronic osteomyelitis  · No evidence of acute infection  · P 500 bed ordered

## 2019-10-30 NOTE — PLAN OF CARE
Problem: Potential for Falls  Goal: Patient will remain free of falls  Description  INTERVENTIONS:  - Assess patient frequently for physical needs  -  Identify cognitive and physical deficits and behaviors that affect risk of falls  -  [place] fall precautions as indicated by assessment   - Educate patient/family on patient safety including physical limitations  - Instruct patient to call for assistance with activity based on assessment  - Modify environment to reduce risk of injury  - Consider OT/PT consult to assist with strengthening/mobility  Outcome: Progressing     Problem: Prexisting or High Potential for Compromised Skin Integrity  Goal: Skin integrity is maintained or improved  Description  INTERVENTIONS:  - Identify patients at risk for skin breakdown  - Assess and monitor skin integrity  - Assess and monitor nutrition and hydration status  - Monitor labs   - Assess for incontinence   - Turn and reposition patient  - Assist with mobility/ambulation  - Relieve pressure over bony prominences  - Avoid friction and shearing  - Provide appropriate hygiene as needed including keeping skin clean and dry  - Evaluate need for skin moisturizer/barrier cream  - Collaborate with interdisciplinary team   - Patient/family teaching  - Consider wound care consult   Outcome: Progressing     Problem: Nutrition/Hydration-ADULT  Goal: Nutrient/Hydration intake appropriate for improving, restoring or maintaining nutritional needs  Description  Monitor and assess patient's nutrition/hydration status for malnutrition  Collaborate with interdisciplinary team and initiate plan and interventions as ordered  Monitor patient's weight and dietary intake as ordered or per policy  Utilize nutrition screening tool and intervene as necessary  Determine patient's food preferences and provide high-protein, high-caloric foods as appropriate       INTERVENTIONS:  - Monitor oral intake, urinary output, labs, and treatment plans  - Assess nutrition and hydration status and recommend course of action  - Evaluate amount of meals eaten  - Assist patient with eating if necessary   - Allow adequate time for meals  - Recommend/ encourage appropriate diets, oral nutritional supplements, and vitamin/mineral supplements  - Order, calculate, and assess calorie counts as needed  - Recommend, monitor, and adjust tube feedings and TPN/PPN based on assessed needs  - Assess need for intravenous fluids  - Provide specific nutrition/hydration education as appropriate  - Include patient/family/caregiver in decisions related to nutrition  Outcome: Progressing     Problem: PAIN - ADULT  Goal: Verbalizes/displays adequate comfort level or baseline comfort level  Description  Interventions:  - Encourage patient to monitor pain and request assistance  - Assess pain using appropriate pain scale  - Administer analgesics based on type and severity of pain and evaluate response  - Implement non-pharmacological measures as appropriate and evaluate response  - Consider cultural and social influences on pain and pain management  - Notify physician/advanced practitioner if interventions unsuccessful or patient reports new pain  Outcome: Progressing     Problem: INFECTION - ADULT  Goal: Absence or prevention of progression during hospitalization  Description  INTERVENTIONS:  - Assess and monitor for signs and symptoms of infection  - Monitor lab/diagnostic results  - Monitor all insertion sites, i e  indwelling lines, tubes, and drains  - Monitor endotracheal if appropriate and nasal secretions for changes in amount and color  - [place] appropriate cooling/warming therapies per order  - Administer medications as ordered  - Instruct and encourage patient and family to use good hand hygiene technique  - Identify and instruct in appropriate isolation precautions for identified infection/condition  Outcome: Progressing     Problem: SAFETY ADULT  Goal: Maintain or return to baseline ADL function  Description  INTERVENTIONS:  -  Assess patient's ability to carry out ADLs; assess patient's baseline for ADL function and identify physical deficits which impact ability to perform ADLs (bathing, care of mouth/teeth, toileting, grooming, dressing, etc )  - Assess/evaluate cause of self-care deficits   - Assess range of motion  - Assess patient's mobility; develop plan if impaired  - Assess patient's need for assistive devices and provide as appropriate  - Encourage maximum independence but intervene and supervise when necessary  - Involve family in performance of ADLs  - Assess for home care needs following discharge   - Consider OT consult to assist with ADL evaluation and planning for discharge  - Provide patient education as appropriate  Outcome: Progressing  Goal: Maintain or return mobility status to optimal level  Description  INTERVENTIONS:  - Assess patient's baseline mobility status (ambulation, transfers, stairs, etc )    - Identify cognitive and physical deficits and behaviors that affect mobility  - Identify mobility aids required to assist with transfers and/or ambulation (gait belt, sit-to-stand, lift, walker, cane, etc )  - [place] fall precautions as indicated by assessment  - Record patient progress and toleration of activity level on Mobility SBAR; progress patient to next Phase/Stage  - Instruct patient to call for assistance with activity based on assessment  - Consider rehabilitation consult to assist with strengthening/weightbearing, etc   Outcome: Progressing     Problem: DISCHARGE PLANNING  Goal: Discharge to home or other facility with appropriate resources  Description  INTERVENTIONS:  - Identify barriers to discharge w/patient and caregiver  - Arrange for needed discharge resources and transportation as appropriate  - Identify discharge learning needs (meds, wound care, etc )  - Arrange for interpretive services to assist at discharge as needed  - Refer to Case Management Department for coordinating discharge planning if the patient needs post-hospital services based on physician/advanced practitioner order or complex needs related to functional status, cognitive ability, or social support system  Outcome: Progressing     Problem: Knowledge Deficit  Goal: Patient/family/caregiver demonstrates understanding of disease process, treatment plan, medications, and discharge instructions  Description  Complete learning assessment and assess knowledge base    Interventions:  - Provide teaching at level of understanding  - Provide teaching via preferred learning methods  Outcome: Progressing     Problem: GASTROINTESTINAL - ADULT  Goal: Minimal or absence of nausea and/or vomiting  Description  INTERVENTIONS:  - Administer IV fluids if ordered to ensure adequate hydration  - Maintain NPO status until nausea and vomiting are resolved  - Nasogastric tube if ordered  - Administer ordered antiemetic medications as needed  - Provide nonpharmacologic comfort measures as appropriate  - Advance diet as tolerated, if ordered  - Consider nutrition services referral to assist patient with adequate nutrition and appropriate food choices  Outcome: Progressing  Goal: Maintains or returns to baseline bowel function  Description  INTERVENTIONS:  - Assess bowel function  - Encourage oral fluids to ensure adequate hydration  - Administer IV fluids if ordered to ensure adequate hydration  - Administer ordered medications as needed  - Encourage mobilization and activity  - Consider nutritional services referral to assist patient with adequate nutrition and appropriate food choices  Outcome: Progressing  Goal: Maintains adequate nutritional intake  Description  INTERVENTIONS:  - Monitor percentage of each meal consumed  - Identify factors contributing to decreased intake, treat as appropriate  - Assist with meals as needed  - Monitor I&O, weight, and lab values if indicated  - Obtain nutrition services referral as needed  Outcome: Progressing  Goal: Establish and maintain optimal ostomy function  Description  INTERVENTIONS:  - Assess bowel function  - Encourage oral fluids to ensure adequate hydration  - Administer IV fluids if ordered to ensure adequate hydration   - Administer ordered medications as needed  - Encourage mobilization and activity  - Nutrition services referral to assist patient with appropriate food choices  - Assess stoma site  - Consider wound care consult   Outcome: Progressing     Problem: GENITOURINARY - ADULT  Goal: Maintains or returns to baseline urinary function  Description  INTERVENTIONS:  - Assess urinary function  - Encourage oral fluids to ensure adequate hydration if ordered  - Administer IV fluids as ordered to ensure adequate hydration  - Administer ordered medications as needed  - Offer frequent toileting  - Follow urinary retention protocol if ordered  Outcome: Progressing  Goal: Absence of urinary retention  Description  INTERVENTIONS:  - Assess patients ability to void and empty bladder  - Monitor I/O  - Bladder scan as needed  - Discuss with physician/AP medications to alleviate retention as needed  - Discuss catheterization for long term situations as appropriate  Outcome: Progressing  Goal: Urinary catheter remains patent  Description  INTERVENTIONS:  - Assess patency of urinary catheter  - If patient has a chronic dangelo, consider changing catheter if non-functioning  - Follow guidelines for intermittent irrigation of non-functioning urinary catheter  Outcome: Progressing     Problem: SKIN/TISSUE INTEGRITY - ADULT  Goal: Skin integrity remains intact  Description  INTERVENTIONS  - Identify patients at risk for skin breakdown  - Assess and monitor skin integrity  - Assess and monitor nutrition and hydration status  - Monitor labs (i e  albumin)  - Assess for incontinence   - Turn and reposition patient  - Assist with mobility/ambulation  - Relieve pressure over bony prominences  - Avoid friction and shearing  - Provide appropriate hygiene as needed including keeping skin clean and dry  - Evaluate need for skin moisturizer/barrier cream  - Collaborate with interdisciplinary team (i e  Nutrition, Rehabilitation, etc )   - Patient/family teaching  Outcome: Progressing  Goal: Incision(s), wounds(s) or drain site(s) healing without S/S of infection  Description  INTERVENTIONS  - Assess and document risk factors for skin impairment   - Assess and document dressing, incision, wound bed, drain sites and surrounding tissue  - Consider nutrition services referral as needed  - Oral mucous membranes remain intact  - Provide patient/ family education  Outcome: Progressing  Goal: Oral mucous membranes remain intact  Description  INTERVENTIONS  - Assess oral mucosa and hygiene practices  - Implement preventative oral hygiene regimen  - Implement oral medicated treatments as ordered  - Initiate Nutrition services referral as needed  Outcome: Progressing     Problem: HEMATOLOGIC - ADULT  Goal: Maintains hematologic stability  Description  INTERVENTIONS  - Assess for signs and symptoms of bleeding or hemorrhage  - Monitor labs  - Administer supportive blood products/factors as ordered and appropriate  Outcome: Progressing     Problem: MUSCULOSKELETAL - ADULT  Goal: Maintain or return mobility to safest level of function  Description  INTERVENTIONS:  - Assess patient's ability to carry out ADLs; assess patient's baseline for ADL function and identify physical deficits which impact ability to perform ADLs (bathing, care of mouth/teeth, toileting, grooming, dressing, etc )  - Assess/evaluate cause of self-care deficits   - Assess range of motion  - Assess patient's mobility  - Assess patient's need for assistive devices and provide as appropriate  - Encourage maximum independence but intervene and supervise when necessary  - Involve family in performance of ADLs  - Assess for home care needs following discharge - Consider OT consult to assist with ADL evaluation and planning for discharge  - Provide patient education as appropriate  Outcome: Progressing  Goal: Maintain proper alignment of affected body part  Description  INTERVENTIONS:  - Support, maintain and protect limb and body alignment  - Provide patient/ family with appropriate education  Outcome: Progressing

## 2019-10-30 NOTE — ASSESSMENT & PLAN NOTE
· Patient presented with abdominal pain, distention and absent colostomy output for the last few days  · Imaging suggestive of small-bowel obstruction  · Currently on conservative management as per surgery team  · Obstruction series showed possible improvement  · Was started on full liquid diet  · Large amount of liquid stool in colostomy bag  · Advance diet as per primary service

## 2019-10-30 NOTE — PROGRESS NOTES
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  Consider secondary to ileus versus small-bowel obstruction although this is an unlikely source of fever  Also consider intraabdominal/pelvic infection as 2 fluid collections were noted in the R renal pelvis on CT scan  Chest x-ray was negative for acute cardiopulmonary findings   Blood cultures are negative after 48 hours   Fortunately the patient remains clinically stable and nontoxic  He continues to have intermittent fevers but his WBC count has normalized  He is currently receiving IV zosyn and is tolerating without difficulty  Due to ongoing fevers we will move him to Ertapenem today while we await final culture results   -stop IV zosyn  -start IV ertapenem, 1g q24 hours  -check CBC and BMP tomorrow  -follow up blood cultures  -follow up urine culture  -monitor vitals  -supportive care     2  Possible UTI  UA collected from suprapubic tube does appear abnormal and his urine continues to appear dark and cloudy  Formal urine culture is preliminarily showing Gram-negative enteric like rods  He unfortunately has a 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, MRSA, and e coli ESBL  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   New concern with further erosion as the penile prosthesis is now exposed in the wound base  Unclear if urinary bacteria are playing a part in this erosion    -antibiotic as above  -check CBC and BMP tomorrow  -follow up urine culture  -monitor vitals  -serial suprapubic tube examination and care  -monitor urine output     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 implant noted in left buttock decubitus ulcer  Penile implant should be removed as soon as possible  Urology is now following the patient closely  He is tentatively scheduled for removal on Friday, 11/01/2019   -continue close follow-up with Urology     4  Small bowel obstruction verses ileus  CT of the abdomen and pelvis was concerning for dilation of his proximal and mid jejunum consistent with a bowel obstruction  Two small fluid collections were also noted within the right pelvis which were new when compared to previous pelvic imaging   He had not had output from his ostomy since 10/26/2019 but started seeing stool yesterday, 10/29/2019  While patient does have some increased nausea this morning his abdomen is softer and less distended  He continues to follow closely with General surgery   -serial abdominal exams  -serial ostomy examination and care  -monitor GI symptoms  -monitor stool output  -continue close follow up with general surgery     5  Chronic left buttock decubitus ulceration   POA   With chronic underlying osteomyelitis   Now with exposed penile prosthesis in the wound base  Wound has had no purulent drainage and has not appeared cellulitic on exam  Wound management is following and have made wound care recommendations  Implant is scheduled for tentative removal on 11/01/2019    -serial wound exams  -local wound care per wound management team  -continue follow up with the wound management team     6  History of C diff   Patient currently without output in his ostomy   Concern for small bowel obstruction versus ileus   He will still require PO vancomycin prophylaxis as he receives above antibiotics and for 72 hours after their completion   -continue PO vancomycin prophylaxis  -monitor stool output  -monitor abdominal symptoms     7  History of resistant organisms including ESBL E coli, MRSA, and Pseudomonas      8  Paraplegia  Above plan was discussed in detail with patient at the bedside  Above plan was discussed in detail with Esthela PERRIN PA-C  Antibiotics:  Zosyn - stop  Ertapenem 1  Antibiotics 3    Subjective:  Patient reports he is having some increased nausea this morning  States they brought him is breakfast tray but he is not yet ready to eat anything as he is afraid of throwing up  He is pleased that his abdomen feels softer today and he reports he noticed stool starting to come out of his ostomy yesterday afternoon  He is having some middle upper abdominal pain which he feels is slightly worse than yesterday but not as bad as when he was feeling at home  He reports he had fevers overnight but they were not accompanied by any symptoms, denies chills, sweats, shakes; no cough, shortness of breath, or chest pain  No concerns with his suprapubic catheter  No new symptoms  Objective:  Vitals:  Temp:  [98 9 °F (37 2 °C)-102 4 °F (39 1 °C)] 98 9 °F (37 2 °C)  HR:  [] 98  Resp:  [16-18] 18  BP: ()/(68-77) 123/77  SpO2:  [92 %-99 %] 93 %  Temp (24hrs), Av 7 °F (38 2 °C), Min:98 9 °F (37 2 °C), Max:102 4 °F (39 1 °C)  Current: Temperature: 98 9 °F (37 2 °C)    Physical Exam:   General Appearance:  Alert, interactive, nontoxic, no acute distress  Patient is chronically ill and debilitated  Throat: Oropharynx moist without lesions      Lungs:   Clear to auscultation bilaterally; no wheezes, rhonchi or rales; respirations unlabored   Heart:  Tachycardic; no murmur, rub or gallop   Abdomen:   Distended but fairly soft, tender over epigastric region with palpation, hypoactive bowel sounds; ostomy pouch intact with some gas in bag, now with small mushy brown stool output; suprapubic tube intact, no spreading erythema or leakage at site, urine output is dark yellow without sediment   Back: Did not roll patient to examine his buttock wounds as he was very nauseous during my visit   Extremities: No clubbing or cyanosis, no left lower extremity edema; patient has right BKA   Skin: No new rashes, lesions, or draining wounds noted on exposed skin  Labs, Imaging, & Other studies:   All pertinent labs and imaging studies were personally reviewed  Results from last 7 days   Lab Units 10/30/19  0523 10/29/19  0537 10/28/19  0400   WBC Thousand/uL 6 86 6 89 16 39*   HEMOGLOBIN g/dL 8 4* 8 1* 10 7*   PLATELETS Thousands/uL 360 334 479*     Results from last 7 days   Lab Units 10/30/19  0523 10/29/19  0537 10/28/19  0451   POTASSIUM mmol/L 2 9* 3 2* 3 5   CHLORIDE mmol/L 103 104 98*   CO2 mmol/L 24 20* 24   BUN mg/dL 8 14 23   CREATININE mg/dL 0 59* 0 57* 0 86   EGFR ml/min/1 73sq m 112 113 96   CALCIUM mg/dL 8 5 8 4 9 0   AST U/L  --  25 54*  51*   ALT U/L  --  7* 13  14   ALK PHOS U/L  --  63 80  86     Results from last 7 days   Lab Units 10/28/19  0608 10/28/19  0406 10/28/19  0400   BLOOD CULTURE   --  No Growth at 48 hrs  No Growth at 48 hrs     URINE CULTURE  >100,000 cfu/ml Gram Negative [place] Enteric Like*  --   --

## 2019-10-30 NOTE — H&P (VIEW-ONLY)
Consult - Urology   Bruce Alvarez 1960, 63 y o  male MRN: [id number]    [facility] [address] [id number] Encounter: [id number]    Bedside rounds performed with RN  Discussed with Dr Mulu Gregory and plan  64-year-old male known to our service for neurogenic bladder (spinal paraplegia), previously performing CIC, with recurrent urinary tract infections ultimately requiring Solomon catheter drainage, followed by urethral failure now with suprapubic catheter since this summer  Patient also has a remote history of implanted penile prosthesis for ED with previous erosion of left cylinder, explanted a few years ago  Now with large sacral decubitus ulcer and visible portion of remaining penile prosthesis cylinder  The implant has been nonfunctional for about 2 years per patient  He has been counseled on several occasions and offered patient explantation of the device  though he has refused on previous admissions as he clinically improved with IV antibiotics in the past  He is slated for outpatient procedure with his urologist Dr Jc Rodriguez for explantation next month  Patient initially admitted 10/28/19 for low grade fever tachycardia and possible SBO/ileus as well as positive urinalysis  Given recurrence of his sepsis suspected urinary source and ongoing risk with exposed implant as nidus for infection, we plan to expedite his explantation during this hospitalization  He was febrile overnight Monday and Tuesday without other vital sign derangement  He remains on IV antibiotics directed by ID (vancomycin/zosyn)  Leukocytosis is resolved  Renal function is low at baseline  Operation is slated to occur at this campus Friday November 1st with Dr Jc Rodriguez  Review of Systems   Constitutional: Negative  Negative for chills and fever  Respiratory: Negative for cough and shortness of breath  Cardiovascular: Negative for chest pain     Gastrointestinal: Positive for abdominal distention and abdominal pain  Negative for vomiting  Genitourinary: Positive for difficulty urinating  Negative for decreased urine volume, dysuria, flank pain, frequency, hematuria and urgency  Musculoskeletal: Positive for gait problem  Skin: Positive for wound  Objective:  Vitals: Blood pressure 123/77, pulse 98, temperature 98 9 °F (37 2 °C), resp  rate 18, weight 66 7 kg (147 lb 0 8 oz), SpO2 93 %  ,Body mass index is 23 03 kg/m²  Intake/Output Summary (Last 24 hours) at 10/30/2019 0854  Last data filed at 10/30/2019 0604  Gross per 24 hour   Intake 1900 ml   Output 2800 ml   Net -900 ml       Invasive Devices     Peripheral Intravenous Line            Peripheral IV 10/28/19 Left Hand 2 days    Peripheral IV 10/28/19 Right;Distal Forearm 2 days          Drain            Colostomy Descending/sigmoid LLQ -- days    Suprapubic Catheter Non-latex 16 Fr  40 days                Physical Exam   Constitutional: He is oriented to person, place, and time  Chronically ill-appearing sitting upright in bed, conversive, pleasant no acute distress   HENT:   Head: Normocephalic and atraumatic  Cardiovascular: Normal rate, regular rhythm and normal heart sounds  No murmur heard  Pulmonary/Chest: Effort normal and breath sounds normal  He has no wheezes  He has no rales  Abdominal: Soft  Bowel sounds are normal  He exhibits no distension  There is no tenderness  Left lower quadrant ostomy with liquid output and gas   Genitourinary:   Genitourinary Comments: Suprapubic catheter without gaby wound redness or drainage  Christina urine draining, clear without clots  Penis with ventral erosion, scrotum and contents normal   Examination of left sacral wound without cellulitis or drainage, packed dry; in deepest portion of wound, visible portion of white silicone penile implant  No leakage of urine from sacral wound  Musculoskeletal:   aka   Neurological: He is alert and oriented to person, place, and time   Gait normal    Skin: Skin is warm and dry  Capillary refill takes less than 2 seconds  No pallor  Psychiatric: He has a normal mood and affect  His speech is normal and behavior is normal    Nursing note and vitals reviewed        History:    Past Medical History:   Diagnosis Date    Anemia     Blind     r eye    Chronic cystitis     [facility] in [facility])     Detrusor sphincter dyssynergia     Diabetes mellitus ([facility] [address] )     Poorly controlled type 2; Last Assessed:  3/18/14    Erectile dysfunction     Frequency of urination     GERD (gastroesophageal reflux disease)     History of diabetes mellitus     History of osteomyelitis     Hx of leg amputation ([facility])     r high upper leg    Hyperlipidemia     Hypertension     Hypospadias     Incomplete bladder emptying     Neurogenic bladder     Paralysis (HCC)     Paraplegia ([facility])     Spinal cord cysts     Ulcer of sacral region ([facility] [address] )     Urge incontinence      Past Surgical History:   Procedure Laterality Date    AMPUTATION      At hip; Last Assessed:  1/19/16    BLADDER SURGERY      COLON SURGERY      llq ostomy pouch    COLOSTOMY      COMPLEX CYSTOMETROGRAM  2014    CT CYSTOGRAM  9/19/2019    CYSTOSCOPY  2014    IR PICC REPO  9/23/2019    IR SUPRAPUBIC TUBE  9/19/2019    LEG AMPUTATION      MEATOTOMY      PENILE PROSTHESIS IMPLANT  2011    ND ADJ TISS XFER SCALP,EXTREM 10 1-30 SQCM Left 5/1/2017    Procedure: POSTERIOR THIGH V-Y ADMANCEMENT;  Surgeon: Antwan Saldana MD;  [facility];  Service: Plastics    ND MUSCLE-SKIN FLAP,TRUNK Left 5/1/2017    Procedure: FLAP CLOSURE LEFT ISCHIAL WOUND and "RIGHT" ISCHIAL FLAP ADVANCEMENT * DEBRIDEMENT, VAC PLACEMENT ;  Surgeon: Antwan Saldana MD;  [facility];  Service: Plastics    ND MUSCLE-SKIN FLAP,TRUNK Left 9/27/2017    Procedure: gluteal myocutaneous rotational flap, posterior thigh v to y advancement- wound 5 x 2 5 x 8;  Surgeon: Antwan Saldana MD;  [facility];  Service: Plastics    SPINE SURGERY      Lower back    UROFLOWMETRY SIMPLE / COMPLEX       Family History   Problem Relation Age of Onset    No Known Problems Mother     No Known Problems Father      Social History     Socioeconomic History    Marital status: /Civil Union     Spouse name: None    Number of children: None    Years of education: None    Highest education level: None   Occupational History    None   Social Needs    Financial resource strain: None    Food insecurity:     Worry: None     Inability: None    Transportation needs:     Medical: None     Non-medical: None   Tobacco Use    Smoking status: Former Smoker     Packs/day: 0 50     Years: 10 00     Pack years: 5 00     Last attempt to quit: Tiff Funez     Years since quittin 8    Smokeless tobacco: Never Used    Tobacco comment: Onset date:  11/10/17   Substance and Sexual Activity    Alcohol use: Never     Frequency: Never     Comment: Per [employer]:  Social drinker (Onset date:  11/10/17)    Drug use: No    Sexual activity: None   Lifestyle    Physical activity:     Days per week: None     Minutes per session: None    Stress: None   Relationships    Social connections:     Talks on phone: None     Gets together: None     Attends [employer] service: None     Active member of club or organization: None     Attends meetings of clubs or organizations: None     Relationship status: None    Intimate partner violence:     Fear of current or ex partner: None     Emotionally abused: None     Physically abused: None     Forced sexual activity: None   Other Topics Concern    None   Social History Narrative    Native language German    Mosque    Social history reviewed, unchanged       Imaging:    CT abdomen pelvis with contrast [[id number]] Collected: 10/28/19 0838   Order Status: Completed Updated: 10/28/19 0855   Narrative:     CT ABDOMEN AND PELVIS WITH IV CONTRAST    INDICATION:   abd  pain, fever, nausea      COMPARISON:  9/19/2019    TECHNIQUE:  CT examination of the abdomen and pelvis was performed  Axial, sagittal, and coronal 2D reformatted images were created from the source data and submitted for interpretation  Radiation dose length product (DLP) for this visit: 69 687 56 60 mGy-cm    This examination, like all CT scans performed in the [address] [place] [address], was performed utilizing techniques to minimize radiation dose exposure, including the use of iterative   reconstruction and automated exposure control  IV Contrast:  100 mL of iohexol (OMNIPAQUE)  Enteric Contrast:  Enteric contrast was not administered  FINDINGS:    ABDOMEN    LOWER CHEST:  Mild basilar atelectatic change  LIVER/BILIARY TREE:  Unremarkable  GALLBLADDER:  No calcified gallstones  No pericholecystic inflammatory change  SPLEEN:  Unremarkable  PANCREAS:  Unremarkable  ADRENAL GLANDS:  Unremarkable  KIDNEYS/URETERS:  No hydronephrosis or nephrolithiasis   Bilateral renal cysts are noted  STOMACH AND BOWEL:  Limited without oral contrast   There is some fluid layering within the dependent portion of the stomach   Normal duodenum  Robbert Louder is marked dilatation of the proximal jejunum within the upper abdomen with abrupt transition seen in   the left mid abdomen, series 2 image 54   This portion of the jejunum where there is abrupt transition demonstrates circumferential thickening of the bowel wall   There is fluid distention of the mid to distal small bowel loops   Cecum is located within   the midline of the mid abdomen   Mild fluid distention of the right colon without dilatation   Transverse colon and left colon are mostly decompressed   The left colon terminates within a left paramedian abdominal wall ostomy  The visualized sigmoid colon within the pelvis is unremarkable   Possible mass within the rectum incompletely evaluated on this examination   The appearance of the rectum is unchanged      APPENDIX:  No findings to suggest appendicitis  ABDOMINOPELVIC CAVITY: Cyril Hernandez is no pneumoperitoneum   No pathologic adenopathy or discrete soft tissue mass identified   Small collections of fluid are scattered within the abdomen  Cyril Mary is a small collection anterior to the right colon within the   right lower quadrant on series 2 image 51 measuring 4 7 x 1 4 cm   There is a small collection of fluid within the right paramedian pelvis on series 2 image 69 measuring 4 6 x 2 3 cm   Both of these are new compared to the prior examination   Small   amount of ascites is present within the left pericolic gutter  VESSELS:  Unremarkable for patient's age  PELVIS    REPRODUCTIVE ORGANS:  Penile implant present   The balloon within the anterior inferior pelvis is decompressed  URINARY BLADDER:  Suprapubic catheter within a mostly decompressed bladder, limiting evaluation   Portions of the lateral do demonstrate wall thickening   Small amount of air within the bladder is nonspecific and may be iatrogenic  ABDOMINAL WALL/INGUINAL REGIONS:  As described above there is a left paramedian lower abdominal/pelvic colostomy   Small fat-containing umbilical and periumbilical hernia   Inguinal regions are unremarkable  There is a large decubitus ulcer again present within the posterior inferior pelvis and perineum overlying the issue of the pelvis   A portion of the penile prosthesis posteriorly appears exposed at the level of the decubitus ulcer   This finding is new   and indicates progression of ulceration compared to the prior examination  OSSEOUS STRUCTURES:  Chronic osteomyelitis is seen involving the bony pelvis including the left inferior pubic ramus, ischium and right aspect of the pelvis and hip   These findings appear unchanged     Impression:       There is a Solomon catheter within a mostly decompressed bladder   Portions of the bladder wall didn't appear thickened   Correlate with urinalysis   Small amount of air within the bladder is nonspecific and may be iatrogenic  Markedly dilated proximal and mid jejunum with air-fluid levels consistent with bowel obstruction  Buzzy Mad is an abrupt transition of jejunum within the left upper quadrant seen on series 2 images 51 through 57   Wall thickening within this portion of the   jejunum   The obstruction is of unclear etiology and may be related to adhesions   Mid to distal small bowel demonstrates mild fluid distention is mostly decompressed  Mild abdominal and pelvic ascites   2 discrete fluid collections are identified within the abdomen, one anteriorly on the right seen on series 2 image 51 and one within the right paramedian pelvis on image 69   Both of these are also well seen on coronal   imaging, images 37 and 75   They do not demonstrate peripheral wall enhancement at this time  There is a large decubitus ulcer identified within the left posterior perineum with chronic osteomyelitis of the underlying bony pelvis   The ulceration now results in partial exposure of the posterior aspect of the penile implant, a new finding compared   to the prior examination  Imaging reviewed - both report and images personally reviewed       Labs:  Recent Labs     10/28/19  0400 10/29/19  0537 10/30/19  0523   WBC 16 39* 6 89 6 86     Recent Labs     10/28/19  0400 10/29/19  0537 10/30/19  0523   HGB 10 7* 8 1* 8 4*       Recent Labs     10/28/19  0451 10/29/19  0537 10/30/19  0523   CREATININE 0 86 0 57* 0 59*       Microbiology:  Urine culture >100k gram-negative balta enteric like  Blood culture 1- no growth at 48 hours  Blood culture 2- no growth at 48 hours    Sharan Arriaga PA-C  Date: 10/30/2019 Time: 8:54 AM

## 2019-10-30 NOTE — CONSULTS
Consult - Urology   Nirmala Carter 1960, 63 y o  male MRN: [id number]    Unit/Bed#: [id number] Encounter: [id number]    Bedside rounds performed with RN  Discussed with Dr Jefferson Bowie and plan  61-year-old male known to our service for neurogenic bladder (spinal paraplegia), previously performing CIC, with recurrent urinary tract infections ultimately requiring Solomon catheter drainage, followed by urethral failure now with suprapubic catheter since this summer  Patient also has a remote history of implanted penile prosthesis for ED with previous erosion of left cylinder, explanted a few years ago  Now with large sacral decubitus ulcer and visible portion of remaining penile prosthesis cylinder  The implant has been nonfunctional for about 2 years per patient  He has been counseled on several occasions and offered patient explantation of the device  though he has refused on previous admissions as he clinically improved with IV antibiotics in the past  He is slated for outpatient procedure with his urologist Dr Smitha Smith for explantation next month  Patient initially admitted 10/28/19 for low grade fever tachycardia and possible SBO/ileus as well as positive urinalysis  Given recurrence of his sepsis suspected urinary source and ongoing risk with exposed implant as nidus for infection, we plan to expedite his explantation during this hospitalization  He was febrile overnight Monday and Tuesday without other vital sign derangement  He remains on IV antibiotics directed by ID (vancomycin/zosyn)  Leukocytosis is resolved  Renal function is low at baseline  Operation is slated to occur at this campus Friday November 1st with Dr Smitha Smith  Review of Systems   Constitutional: Negative  Negative for chills and fever  Respiratory: Negative for cough and shortness of breath  Cardiovascular: Negative for chest pain     Gastrointestinal: Positive for abdominal distention and abdominal pain  Negative for vomiting  Genitourinary: Positive for difficulty urinating  Negative for decreased urine volume, dysuria, flank pain, frequency, hematuria and urgency  Musculoskeletal: Positive for gait problem  Skin: Positive for wound  Objective:  Vitals: Blood pressure 123/77, pulse 98, temperature 98 9 °F (37 2 °C), resp  rate 18, weight 66 7 kg (147 lb 0 8 oz), SpO2 93 %  ,Body mass index is 23 03 kg/m²  Intake/Output Summary (Last 24 hours) at 10/30/2019 0854  Last data filed at 10/30/2019 0604  Gross per 24 hour   Intake 1900 ml   Output 2800 ml   Net -900 ml       Invasive Devices     Peripheral Intravenous Line            Peripheral IV 10/28/19 Left Hand 2 days    Peripheral IV 10/28/19 Right;Distal Forearm 2 days          Drain            Colostomy Descending/sigmoid LLQ -- days    Suprapubic Catheter Non-latex 16 Fr  40 days                Physical Exam   Constitutional: He is oriented to person, place, and time  Chronically ill-appearing sitting upright in bed, conversive, pleasant no acute distress   HENT:   Head: Normocephalic and atraumatic  Cardiovascular: Normal rate, regular rhythm and normal heart sounds  No murmur heard  Pulmonary/Chest: Effort normal and breath sounds normal  He has no wheezes  He has no rales  Abdominal: Soft  Bowel sounds are normal  He exhibits no distension  There is no tenderness  Left lower quadrant ostomy with liquid output and gas   Genitourinary:   Genitourinary Comments: Suprapubic catheter without gaby wound redness or drainage  Christina urine draining, clear without clots  Penis with ventral erosion, scrotum and contents normal   Examination of left sacral wound without cellulitis or drainage, packed dry; in deepest portion of wound, visible portion of white silicone penile implant  No leakage of urine from sacral wound  Musculoskeletal:   aka   Neurological: He is alert and oriented to person, place, and time   Gait normal    Skin: Skin is warm and dry  Capillary refill takes less than 2 seconds  No pallor  Psychiatric: He has a normal mood and affect  His speech is normal and behavior is normal    Nursing note and vitals reviewed        History:    Past Medical History:   Diagnosis Date    Anemia     Blind     r eye    Chronic cystitis     Colostomy in place [facility])     Detrusor sphincter dyssynergia     Diabetes mellitus ([facility] [place] [address] )     Poorly controlled type 2; Last Assessed:  3/18/14    Erectile dysfunction     Frequency of urination     GERD (gastroesophageal reflux disease)     History of diabetes mellitus     History of osteomyelitis     Hx of leg amputation ([facility])     r high upper leg    Hyperlipidemia     Hypertension     Hypospadias     Incomplete bladder emptying     Neurogenic bladder     Paralysis ([facility])     Paraplegia ([facility])     Spinal cord cysts     Ulcer of sacral region ([facility] [place] [address] )     Urge incontinence      Past Surgical History:   Procedure Laterality Date    AMPUTATION      At hip; Last Assessed:  1/19/16    BLADDER SURGERY      COLON SURGERY      llq ostomy pouch    COLOSTOMY      COMPLEX CYSTOMETROGRAM  2014    CT CYSTOGRAM  9/19/2019    CYSTOSCOPY  2014    IR PICC REPO  9/23/2019    IR SUPRAPUBIC TUBE  9/19/2019    LEG AMPUTATION      MEATOTOMY      PENILE PROSTHESIS IMPLANT  2011    KS ADJ TISS XFER SCALP,EXTREM 10 1-30 SQCM Left 5/1/2017    Procedure: POSTERIOR THIGH V-Y ADMANCEMENT;  Surgeon: Noel Douglass MD;  [facility];  Service: Plastics    KS MUSCLE-SKIN FLAP,TRUNK Left 5/1/2017    Procedure: FLAP CLOSURE LEFT ISCHIAL WOUND and "RIGHT" ISCHIAL FLAP ADVANCEMENT * DEBRIDEMENT, VAC PLACEMENT ;  Surgeon: Noel Douglass MD;  [facility];  Service: Plastics    KS MUSCLE-SKIN FLAP,TRUNK Left 9/27/2017    Procedure: gluteal myocutaneous rotational flap, posterior thigh v to y advancement- wound 5 x 2 5 x 8;  Surgeon: Noel Douglass MD;  [facility];  Service: Plastics    SPINE SURGERY      Lower back    UROFLOWMETRY SIMPLE / COMPLEX       Family History   Problem Relation Age of Onset    No Known Problems Mother     No Known Problems Father      Social History     Socioeconomic History    Marital status: /Civil Union     Spouse name: None    Number of children: None    Years of education: None    Highest education level: None   Occupational History    None   Social Needs    Financial resource strain: None    Food insecurity:     Worry: None     Inability: None    Transportation needs:     Medical: None     Non-medical: None   Tobacco Use    Smoking status: Former Smoker     Packs/day: 0 50     Years: 10 00     Pack years: 5 00     Last attempt to quit: 26     Years since quittin 8    Smokeless tobacco: Never Used    Tobacco comment: Onset date:  11/10/17   Substance and Sexual Activity    Alcohol use: Never     Frequency: Never     Comment: Per [employer]:  Social drinker (Onset date:  11/10/17)    Drug use: No    Sexual activity: None   Lifestyle    Physical activity:     Days per week: None     Minutes per session: None    Stress: None   Relationships    Social connections:     Talks on phone: None     Gets together: None     Attends Scientologist service: None     Active member of club or organization: None     Attends meetings of clubs or organizations: None     Relationship status: None    Intimate partner violence:     Fear of current or ex partner: None     Emotionally abused: None     Physically abused: None     Forced sexual activity: None   Other Topics Concern    None   Social History Narrative    Native language Kiswahili    Islam    Social history reviewed, unchanged       Imaging:    CT abdomen pelvis with contrast [[id number]] Collected: 10/28/19 0838   Order Status: Completed Updated: 10/28/19 0855   Narrative:     CT ABDOMEN AND PELVIS WITH IV CONTRAST    INDICATION:   abd  pain, fever, nausea      COMPARISON:  9/19/2019    TECHNIQUE:  CT examination of the abdomen and pelvis was performed  Axial, sagittal, and coronal 2D reformatted images were created from the source data and submitted for interpretation  Radiation dose length product (DLP) for this visit: 69 687 56 60 mGy-cm    This examination, like all CT scans performed in the [place], was performed utilizing techniques to minimize radiation dose exposure, including the use of iterative   reconstruction and automated exposure control  IV Contrast:  100 mL of iohexol (OMNIPAQUE)  Enteric Contrast:  Enteric contrast was not administered  FINDINGS:    ABDOMEN    LOWER CHEST:  Mild basilar atelectatic change  LIVER/BILIARY TREE:  Unremarkable  GALLBLADDER:  No calcified gallstones  No pericholecystic inflammatory change  SPLEEN:  Unremarkable  PANCREAS:  Unremarkable  ADRENAL GLANDS:  Unremarkable  KIDNEYS/URETERS:  No hydronephrosis or nephrolithiasis   Bilateral renal cysts are noted  STOMACH AND BOWEL:  Limited without oral contrast   There is some fluid layering within the dependent portion of the stomach   Normal duodenum  Asha Shaw is marked dilatation of the proximal jejunum within the upper abdomen with abrupt transition seen in   the left mid abdomen, series 2 image 54   This portion of the jejunum where there is abrupt transition demonstrates circumferential thickening of the bowel wall   There is fluid distention of the mid to distal small bowel loops   Cecum is located within   the midline of the mid abdomen   Mild fluid distention of the right colon without dilatation   Transverse colon and left colon are mostly decompressed   The left colon terminates within a left paramedian abdominal wall ostomy  The visualized sigmoid colon within the pelvis is unremarkable   Possible mass within the rectum incompletely evaluated on this examination   The appearance of the rectum is unchanged      APPENDIX:  No findings to suggest appendicitis  ABDOMINOPELVIC CAVITY: Milly Rogers is no pneumoperitoneum   No pathologic adenopathy or discrete soft tissue mass identified   Small collections of fluid are scattered within the abdomen  Millymauricio Rogers is a small collection anterior to the right colon within the   right lower quadrant on series 2 image 51 measuring 4 7 x 1 4 cm   There is a small collection of fluid within the right paramedian pelvis on series 2 image 69 measuring 4 6 x 2 3 cm   Both of these are new compared to the prior examination   Small   amount of ascites is present within the left pericolic gutter  VESSELS:  Unremarkable for patient's age  PELVIS    REPRODUCTIVE ORGANS:  Penile implant present   The balloon within the anterior inferior pelvis is decompressed  URINARY BLADDER:  Suprapubic catheter within a mostly decompressed bladder, limiting evaluation   Portions of the lateral do demonstrate wall thickening   Small amount of air within the bladder is nonspecific and may be iatrogenic  ABDOMINAL WALL/INGUINAL REGIONS:  As described above there is a left paramedian lower abdominal/pelvic colostomy   Small fat-containing umbilical and periumbilical hernia   Inguinal regions are unremarkable  There is a large decubitus ulcer again present within the posterior inferior pelvis and perineum overlying the issue of the pelvis   A portion of the penile prosthesis posteriorly appears exposed at the level of the decubitus ulcer   This finding is new   and indicates progression of ulceration compared to the prior examination  OSSEOUS STRUCTURES:  Chronic osteomyelitis is seen involving the bony pelvis including the left inferior pubic ramus, ischium and right aspect of the pelvis and hip   These findings appear unchanged     Impression:       There is a Solomon catheter within a mostly decompressed bladder   Portions of the bladder wall didn't appear thickened   Correlate with urinalysis   Small amount of air within the bladder is nonspecific and may be iatrogenic  Markedly dilated proximal and mid jejunum with air-fluid levels consistent with bowel obstruction  Cathren Juncos is an abrupt transition of jejunum within the left upper quadrant seen on series 2 images 51 through 57   Wall thickening within this portion of the   jejunum   The obstruction is of unclear etiology and may be related to adhesions   Mid to distal small bowel demonstrates mild fluid distention is mostly decompressed  Mild abdominal and pelvic ascites   2 discrete fluid collections are identified within the abdomen, one anteriorly on the right seen on series 2 image 51 and one within the right paramedian pelvis on image 69   Both of these are also well seen on coronal   imaging, images 37 and 75   They do not demonstrate peripheral wall enhancement at this time  There is a large decubitus ulcer identified within the left posterior perineum with chronic osteomyelitis of the underlying bony pelvis   The ulceration now results in partial exposure of the posterior aspect of the penile implant, a new finding compared   to the prior examination  Imaging reviewed - both report and images personally reviewed       Labs:  Recent Labs     10/28/19  0400 10/29/19  0537 10/30/19  0523   WBC 16 39* 6 89 6 86     Recent Labs     10/28/19  0400 10/29/19  0537 10/30/19  0523   HGB 10 7* 8 1* 8 4*       Recent Labs     10/28/19  0451 10/29/19  0537 10/30/19  0523   CREATININE 0 86 0 57* 0 59*       Microbiology:  Urine culture >100k gram-negative balta enteric like  Blood culture 1- no growth at 48 hours  Blood culture 2- no growth at 48 hours    Roberto Hubbard PA-C  Date: 10/30/2019 Time: 8:54 AM

## 2019-10-30 NOTE — TELEPHONE ENCOUNTER
Pt is currently admitted in the hospital but I was trying to be proactive with this prior auth  I did not see it in either fax bin or under "media" in [place] so I contacted pharmacy to see if they can tell if a prior auth was received  Spoke with Sp Waddell / [facility] pharmacy tech and she advised me they cannot check because the Rx sent on 10/10/19 is no longer in their system and is no longer valid because pt paid out of pocket for 6 pills  She advsied me they would need a new Rx and new prior auth would have to be submitted for it  Dr Vasly Gramajo, I just wanted to give you a heads up

## 2019-10-30 NOTE — SOCIAL WORK
Pt admitted with SBO/Ileus (now resolving) as well as Sepsis 2' possible UTI (has SPC) and infected penile implant (to be removed this admission)  Pt lives alone in a 1st floor apartment where he has HHA's 16hrs/day as pt has a h/o 20+ years of spinal paralysis  He says his wife works in [place] and comes home on weekends  He is independent with bed mobility, transfers to W/C along with ability to maneuver it  He has HHA's who assist with bathing, house cleaning, food preparation, driving to appointments, shopping, laundry  He currently has [facility] in the home ([facility]) for wound care- referral back as requested  Denies MH hx, D&A, legal issues nor having a POA-declined information- but recognizes his wife  Williams Burgess as his healthcare representative  PCP is Dr Roselia Tello and he uses [facility] for prescriptions-voiced a will to utilize meds to beds however would not have the co-pay (if there is one) available in the hospital- would like to check cost if any prior to d/c  He typically uses [facility] to get to appointments or HHA's will transport  Will need BLS transportation on d/c due to Stage IV hip and sacral ulcers, being paraplegic and not having specialized wheelchair available in hospital   No further questions/concerns voiced at this time  Will continue to follow to assist with d/c POC

## 2019-10-30 NOTE — ASSESSMENT & PLAN NOTE
Lab Results   Component Value Date    HGBA1C 5 2 05/06/2019     Recent Labs     10/28/19  1350   POCGLU 82     Blood Sugar Average: Last 72 hrs:  (P) 82   · Patient with a history of diabetes, not on any oral insulin agents at home with an A1c of 5 2 previously  · No longer in prediabetic range with most recent A1C  · Repeat A1C     · Stop SSI insulin and accuchecks given this level of A1C

## 2019-10-30 NOTE — ASSESSMENT & PLAN NOTE
· POA as evidenced by fever, tachycardia and leukocytosis  · Sepsis probably secondary to UTI, penile prosthesis infection  · No evidence of sacral soft tissue infection  Patient is noted to have chronic left buttocks decubitus ulceration with chronic osteomyelitis  · ID following and appreciate their input  Continue IV Zosyn  · Blood cultures:  Currently negative  · Leukocytosis has resolved    · T-max over last 24 hours:  102 4°

## 2019-10-30 NOTE — PROGRESS NOTES
[facility]  Progress Note Mell Suarez 1960, 63 y o  male MRN: [id number]  Unit/Bed#: [place] 68 2 -01 Encounter: [id number]  Primary Care Provider: Luna Iglesias MD   Date and time admitted to hospital: 10/28/2019  3:48 AM    * SBO (small bowel obstruction) ([facility] )  Assessment & Plan  · Patient presented with abdominal pain, distention and absent colostomy output for the last few days  · Imaging suggestive of small-bowel obstruction  · Currently on conservative management as per surgery team  · Obstruction series showed possible improvement  · Was started on full liquid diet  · Large amount of liquid stool in colostomy bag  · Advance diet as per primary service    Sepsis ([place] [id number] )  Assessment & Plan  · POA as evidenced by fever, tachycardia and leukocytosis  · Sepsis probably secondary to UTI, penile prosthesis infection  · No evidence of sacral soft tissue infection  Patient is noted to have chronic left buttocks decubitus ulceration with chronic osteomyelitis  · ID following and appreciate their input  Continue IV Zosyn  · Blood cultures:  Currently negative  · Leukocytosis has resolved  · T-max over last 24 hours:  102 4°    UTI (urinary tract infection) secondary to suprapubic catheter and penile impact infection  Assessment & Plan  · Currently growing greater than 100,000 colonies of gram-negative rods  · Patient has chronic suprapubic catheter and history of positive urine cultures  · Penile prosthetic infection with eroded urethra  · Continue IV antibiotics as for Infectious Disease  · Urology evaluating and planning for penile prosthetic removal on Friday      Malfunction and infection of penile prosthesis ([place] [address] )  Assessment & Plan  · With eroded urethra  · Status post suprapubic catheter placement previously  · Continue IV antibiotics  · As per Urology plan for surgical removal on Friday    Decubitus ulcer of ischium, left, stage IV (HCC)  Assessment & Plan  · Chronic left decubitus ulcer recent with chronic osteomyelitis  · No evidence of acute infection  · P 500 bed ordered    Hypokalemia  Assessment & Plan  · Potassium level: 2 9  · Telemetry ordered  · Will give 40 mEq p  O  X1 and 40 meq IV x1  · Check magnesium   · Likely from IVF and stool output    History of Clostridium difficile colitis  Assessment & Plan  · On prophylactic oral vancomycin  · Ostomy with loose stool    Diabetes mellitus type II, controlled ([facility] )  Assessment & Plan  Lab Results   Component Value Date    HGBA1C 5 2 05/06/2019     Recent Labs     10/28/19  1350   POCGLU 82     Blood Sugar Average: Last 72 hrs:  (P) 82   · Patient with a history of diabetes, not on any oral insulin agents at home with an A1c of 5 2 previously  · No longer in prediabetic range with most recent A1C  · Repeat A1C  · Stop SSI insulin and accuchecks given this level of A1C    Chronic, continuous use of opioids  Assessment & Plan  · PDMP reviewed  · Dicussed with patient  Has been on oxycodone 20 mg TID for some time  · Was recently changed to 20 mg oxycontin BID but this was not approved by his insurance so was still taking oxycodone 20 TID  · Will change back to home regimen of oxycodone 20 mg PO TID    Anemia  Assessment & Plan  · Hemoglobin stable and looks at baseline  · Prior iron studies showed anemia of chronic disease  · No report of EGD or colonoscopy in the past    Lab Results   Component Value Date    HGB 8 4 (L) 10/30/2019    HGB 8 1 (L) 10/29/2019    HGB 10 7 (L) 10/28/2019         VTE Pharmacologic Prophylaxis:   Pharmacologic: Heparin  Mechanical VTE Prophylaxis in Place: Yes    Patient Centered Rounds: I have performed bedside rounds with nursing staff today  Discussions with Specialists or Other Care Team Provider: nursing    Education and Discussions with Family / Patient: patient    Time Spent for Care: 30 minutes    More than 50% of total time spent on counseling and coordination of care as described above     Current Length of Stay: 2 day(s)    Current Patient Status: Inpatient   Discharge Plan: not stable for d/c at this time    Code Status: Level 1 - Full Code      Subjective:   Feels okay- having large amount of stool output in colostomy bag  Notes nausea  No chest pain or SOB  Objective:     Vitals:   Temp (24hrs), Av 7 °F (38 2 °C), Min:98 9 °F (37 2 °C), Max:102 4 °F (39 1 °C)    Temp:  [98 9 °F (37 2 °C)-102 4 °F (39 1 °C)] 98 9 °F (37 2 °C)  HR:  [] 98  Resp:  [16-18] 18  BP: ()/(68-77) 123/77  SpO2:  [92 %-99 %] 93 %  Body mass index is 23 03 kg/m²  Input and Output Summary (last 24 hours): Intake/Output Summary (Last 24 hours) at 10/30/2019 1039  Last data filed at 10/30/2019 1027  Gross per 24 hour   Intake 1900 ml   Output 3150 ml   Net -1250 ml       Physical Exam:     Physical Exam   Constitutional: No distress  HENT:   Head: Normocephalic and atraumatic  Eyes: No scleral icterus  Cardiovascular: Normal rate, regular rhythm, normal heart sounds and intact distal pulses  No murmur heard  Pulmonary/Chest: Effort normal and breath sounds normal  No accessory muscle usage  No respiratory distress  He has no wheezes  He has no rales  Abdominal: Soft  Bowel sounds are normal  He exhibits no distension  There is no tenderness  There is no rigidity, no rebound and no guarding  Abdominal distention, colostomy in place with loose stool output  Hypoactive BS   Musculoskeletal: He exhibits no edema  Right arm contracted   Neurological: He is alert  Right eye cloudy    Skin: Skin is warm and dry  No rash noted  He is not diaphoretic  No erythema  Psychiatric: He has a normal mood and affect  His behavior is normal    Nursing note and vitals reviewed      Additional Data:     Labs:    Results from last 7 days   Lab Units 10/30/19  0523   WBC Thousand/uL 6 86   HEMOGLOBIN g/dL 8 4*   HEMATOCRIT % 28 3*   PLATELETS Thousands/uL 360   NEUTROS PCT % 86*   LYMPHS PCT % 7*   MONOS PCT % 6   EOS PCT % 1     Results from last 7 days   Lab Units 10/30/19  0523 10/29/19  0537   SODIUM mmol/L 135* 136   POTASSIUM mmol/L 2 9* 3 2*   CHLORIDE mmol/L 103 104   CO2 mmol/L 24 20*   BUN mg/dL 8 14   CREATININE mg/dL 0 59* 0 57*   ANION GAP mmol/L 8 12   CALCIUM mg/dL 8 5 8 4   ALBUMIN g/dL  --  1 6*   TOTAL BILIRUBIN mg/dL  --  0 71   ALK PHOS U/L  --  63   ALT U/L  --  7*   AST U/L  --  25   GLUCOSE RANDOM mg/dL 105 64*         Results from last 7 days   Lab Units 10/28/19  1350   POC GLUCOSE mg/dl 82         Results from last 7 days   Lab Units 10/28/19  0400   LACTIC ACID mmol/L 0 9     * I Have Reviewed All Lab Data Listed Above  * Additional Pertinent Lab Tests Reviewed: All Labs Within Last 24 Hours Reviewed    Imaging:    Imaging Reports Reviewed Today Include: obstruction series  Imaging Personally Reviewed by Myself Includes:  none    Recent Cultures (last 7 days):     Results from last 7 days   Lab Units 10/28/19  0608 10/28/19  0406 10/28/19  0400   BLOOD CULTURE   --  No Growth at 48 hrs  No Growth at 48 hrs     URINE CULTURE  >100,000 cfu/ml Gram Negative [facility]*  --   --        Last 24 Hours Medication List:     Current Facility-Administered Medications:  acetaminophen 650 mg Oral Q6H PRN Ebb PHUONG Salazar    heparin (porcine) 5,000 Units Subcutaneous [facility] Paco Salazar    HYDROmorphone 0 5 mg Intravenous Q4H PRN Kimberly Palmer MD    lactated ringers 125 mL/hr Intravenous Continuous Ebb PHUONG Salazar Last Rate: 125 mL/hr (10/30/19 0226)   morphine injection 2 mg Intravenous Q3H PRN Joe Montgomery PA-C    ondansetron 4 mg Intravenous Q6H PRN Ebpeter Salazar PA-C    oxyCODONE 20 mg Oral TID Anil Quinones PA-C    pantoprazole 20 mg Oral Early Morning Kimberly Palmer MD    piperacillin-tazobactam 3 375 g Intravenous Q6H Shanna White DO Last Rate: 3 375 g (10/30/19 1012)   potassium chloride 20 mEq Intravenous Q2H Patricia Quinones PA-C Last Rate: 20 mEq (10/30/19 1017) vancomycin 125 mg Oral Q12H KALPANA Mathew         Today, Patient Was Seen By: Kemar Stevens PA-C    ** Please Note: Dictation voice to text software may have been used in the creation of this document   **

## 2019-10-30 NOTE — ASSESSMENT & PLAN NOTE
· Potassium level: 2 9  · Telemetry ordered  · Will give 40 mEq p   O  X1 and 40 meq IV x1  · Check magnesium   · Likely from IVF and stool output

## 2019-10-31 ENCOUNTER — ANESTHESIA EVENT (INPATIENT)
Dept: PERIOP | Facility: HOSPITAL | Age: 59
DRG: 987 | End: 2019-10-31
Payer: MEDICARE

## 2019-10-31 LAB
ANION GAP SERPL CALCULATED.3IONS-SCNC: 9 MMOL/L (ref 4–13)
BACTERIA UR CULT: ABNORMAL
BUN SERPL-MCNC: 7 MG/DL (ref 5–25)
CALCIUM SERPL-MCNC: 8.5 MG/DL (ref 8.3–10.1)
CHLORIDE SERPL-SCNC: 102 MMOL/L (ref 100–108)
CO2 SERPL-SCNC: 25 MMOL/L (ref 21–32)
CREAT SERPL-MCNC: 0.45 MG/DL (ref 0.6–1.3)
GFR SERPL CREATININE-BSD FRML MDRD: 125 ML/MIN/1.73SQ M
GLUCOSE SERPL-MCNC: 59 MG/DL (ref 65–140)
MAGNESIUM SERPL-MCNC: 1.7 MG/DL (ref 1.6–2.6)
POTASSIUM SERPL-SCNC: 3.7 MMOL/L (ref 3.5–5.3)
SODIUM SERPL-SCNC: 136 MMOL/L (ref 136–145)

## 2019-10-31 PROCEDURE — 99233 SBSQ HOSP IP/OBS HIGH 50: CPT | Performed by: INTERNAL MEDICINE

## 2019-10-31 PROCEDURE — 80048 BASIC METABOLIC PNL TOTAL CA: CPT | Performed by: PHYSICIAN ASSISTANT

## 2019-10-31 PROCEDURE — 99232 SBSQ HOSP IP/OBS MODERATE 35: CPT | Performed by: INTERNAL MEDICINE

## 2019-10-31 PROCEDURE — 83735 ASSAY OF MAGNESIUM: CPT | Performed by: PHYSICIAN ASSISTANT

## 2019-10-31 RX ADMIN — HEPARIN SODIUM 5000 UNITS: 5000 INJECTION INTRAVENOUS; SUBCUTANEOUS at 05:50

## 2019-10-31 RX ADMIN — HEPARIN SODIUM 5000 UNITS: 5000 INJECTION INTRAVENOUS; SUBCUTANEOUS at 14:16

## 2019-10-31 RX ADMIN — HEPARIN SODIUM 5000 UNITS: 5000 INJECTION INTRAVENOUS; SUBCUTANEOUS at 22:05

## 2019-10-31 RX ADMIN — OXYCODONE HYDROCHLORIDE 20 MG: 10 TABLET ORAL at 09:11

## 2019-10-31 RX ADMIN — SODIUM CHLORIDE, SODIUM LACTATE, POTASSIUM CHLORIDE, AND CALCIUM CHLORIDE 125 ML/HR: .6; .31; .03; .02 INJECTION, SOLUTION INTRAVENOUS at 13:08

## 2019-10-31 RX ADMIN — VANCOMYCIN HYDROCHLORIDE 125 MG: 500 INJECTION, POWDER, LYOPHILIZED, FOR SOLUTION INTRAVENOUS at 09:11

## 2019-10-31 RX ADMIN — VANCOMYCIN HYDROCHLORIDE 125 MG: 500 INJECTION, POWDER, LYOPHILIZED, FOR SOLUTION INTRAVENOUS at 22:05

## 2019-10-31 RX ADMIN — PANTOPRAZOLE SODIUM 20 MG: 20 TABLET, DELAYED RELEASE ORAL at 05:50

## 2019-10-31 RX ADMIN — OXYCODONE HYDROCHLORIDE 20 MG: 10 TABLET ORAL at 22:04

## 2019-10-31 RX ADMIN — OXYCODONE HYDROCHLORIDE 20 MG: 10 TABLET ORAL at 15:51

## 2019-10-31 RX ADMIN — MORPHINE SULFATE 2 MG: 2 INJECTION, SOLUTION INTRAMUSCULAR; INTRAVENOUS at 05:50

## 2019-10-31 RX ADMIN — SODIUM CHLORIDE, SODIUM LACTATE, POTASSIUM CHLORIDE, AND CALCIUM CHLORIDE 125 ML/HR: .6; .31; .03; .02 INJECTION, SOLUTION INTRAVENOUS at 04:20

## 2019-10-31 RX ADMIN — ERTAPENEM SODIUM 1000 MG: 1 INJECTION, POWDER, LYOPHILIZED, FOR SOLUTION INTRAMUSCULAR; INTRAVENOUS at 12:30

## 2019-10-31 NOTE — ASSESSMENT & PLAN NOTE
· Sepsis most likely secondary to UTI, infected penile prosthesis  · Continue with broad-spectrum and antibiotics as per ID, continue gentle hydration  · Fever free more than 24 hours now  · Follow-up urine/blood culture  · Patient undergo pain and prosthesis eval in OR tomorrow

## 2019-10-31 NOTE — PROGRESS NOTES
Progress Note - Infectious Disease   Henry Farooqcat 63 y o  male MRN: [id number]  Unit/Bed#: [place] [address] -01 Encounter: [id number]      Impression/Plan:  1  Sepsis   POA   Fever, tachycardia, and leukocytosis   Unclear etiology  Consider secondary to UTI   Patient has a suprapubic catheter due to urethral erosion  His formal urine culture showed E coli ESBL  Also consider secondary to infected penile prosthesis which is exposed in patient's chronic buttock decubitus  Consider secondary to ileus versus small-bowel obstruction although this is an unlikely source of fever  Also consider intraabdominal/pelvic infection as 2 fluid collections were noted in the R renal pelvis on CT scan  Chest x-ray was negative for acute cardiopulmonary findings   Blood cultures are negative after 72 hours   Fortunately the patient remains clinically stable and nontoxic   His fever curve has trended down  WBC count has normalized  The patient is currently receiving IV Ertapenem and is tolerating without difficulty  -continue IV ertapenem  -monitor CBC and BMP  -follow up blood cultures  -monitor vitals  -supportive care     2  Possible ESBL E coli UTI  UA collected from suprapubic tube did appear abnormal  He unfortunately has a 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, MRSA, and e coli ESBL  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  New concern with further erosion as the penile prosthesis is now exposed in the wound base  Unclear if urinary bacteria are playing a part in this erosion    -antibiotic as above  -monitor CBC and BMP  -monitor vitals  -serial suprapubic tube examination and care  -monitor urine output     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 implant noted in left buttock decubitus ulcer  Penile implant should be removed as soon as possible  Urology is following the patient closely    He is tentatively scheduled for implant removal on Friday, 11/01/2019   -continue close follow-up with Urology     4  Small bowel obstruction verses ileus  CT of the abdomen and pelvis was concerning for dilation of his proximal and mid jejunum consistent with a bowel obstruction  Two small fluid collections were also noted within the right pelvis which were new when compared to previous pelvic imaging   He he reported no output from his ostomy since 10/26/2019 but began seeing stool output again on 10/29/2019  He continues to follow closely with General surgery   -serial abdominal exams  -serial ostomy examination and care  -monitor GI symptoms  -monitor stool output  -continue close follow up with general surgery     5  Chronic left buttock decubitus ulceration   POA   With chronic underlying osteomyelitis   Now with exposed penile prosthesis in the wound base  Wound has had no purulent drainage and has not appeared cellulitic on exam  Wound management is following and have made wound care recommendations  Implant is scheduled for tentative removal on 11/01/2019    -serial wound exams  -local wound care per wound management team  -continue follow up with the wound management team     6  History of C diff   Patient currently without output in his ostomy   Concern for small bowel obstruction versus ileus  He will still require PO vancomycin prophylaxis as he receives above antibiotics and for 72 hours after their completion   -continue PO vancomycin prophylaxis  -monitor stool output  -monitor abdominal symptoms     7  History of resistant organisms including ESBL E coli, MRSA, and Pseudomonas      8  Paraplegia  Above plan was discussed in detail with patient at the bedside  Antibiotics:  Ertapenem 2  Antibiotics 4    Subjective:  Patient reports he is feeling well today  States he has less abdominal pain this morning and has no nausea at all  States he did have some breakfast and tolerated it without difficulty    Is pleased to have ongoing stool output in his ostomy pouch  Also reports he is less gassy  He denies fever, chills, sweats, shakes; no cough, shortness of breath, or chest pain  No new symptoms  Objective:  Vitals:  Temp:  [98 2 °F (36 8 °C)-98 9 °F (37 2 °C)] 98 3 °F (36 8 °C)  HR:  [62-79] 62  Resp:  [18-20] 18  BP: (112-118)/(65-77) 117/67  SpO2:  [95 %-97 %] 97 %  Temp (24hrs), Av 5 °F (36 9 °C), Min:98 2 °F (36 8 °C), Max:98 9 °F (37 2 °C)  Current: Temperature: 98 3 °F (36 8 °C)    Physical Exam:   General Appearance:  Alert, interactive, nontoxic, no acute distress  Patient is chronically ill and debilitated  Throat: Oropharynx moist without lesions  Lungs:   Clear to auscultation bilaterally; no wheezes, rhonchi or rales; respirations unlabored   Heart:  RRR; no murmur, rub or gallop   Abdomen:   Mildly distended but soft, mild tenderness with palpation of upper epigastric region, positive bowel sounds; ostomy pouch intact with small mushy brown stool output; suprapubic tube intact, no spreading erythema or leakage at site, urine output is farooq yellow without sediment    Back: Intact dressings to patient's left middle buttock and left distal buttock wounds, are all dry and intact without breakthrough drainage   Extremities: No clubbing or cyanosis; right BKA; no LLE edema   Skin: No new rashes, lesions, or draining wounds noted on exposed skin       Labs, Imaging, & Other studies:   All pertinent labs and imaging studies were personally reviewed  Results from last 7 days   Lab Units 10/30/19  0523 10/29/19  0537 10/28/19  0400   WBC Thousand/uL 6 86 6 89 16 39*   HEMOGLOBIN g/dL 8 4* 8 1* 10 7*   PLATELETS Thousands/uL 360 334 479*     Results from last 7 days   Lab Units 10/31/19  0544  10/29/19  0537 10/28/19  0451   POTASSIUM mmol/L 3 7   < > 3 2* 3 5   CHLORIDE mmol/L 102   < > 104 98*   CO2 mmol/L 25   < > 20* 24   BUN mg/dL 7   < > 14 23   CREATININE mg/dL 0 45*   < > 0 57* 0 86   EGFR ml/min/1 73sq m 125   < > 113 96   CALCIUM mg/dL 8 5   < > 8 4 9 0   AST U/L  --   --  25 54*  51*   ALT U/L  --   --  7* 13  14   ALK PHOS U/L  --   --  63 80  86    < > = values in this interval not displayed  Results from last 7 days   Lab Units 10/28/19  0608 10/28/19  0406 10/28/19  0400   BLOOD CULTURE   --  No Growth at 48 hrs  No Growth at 48 hrs     URINE CULTURE  >100,000 cfu/ml Escherichia coli ESBL*  10,000-19,000 cfu/ml Pseudomonas aeruginosa*  <10,000 cfu/ml Enterococcus species*  --   --

## 2019-10-31 NOTE — ANESTHESIA PREPROCEDURE EVALUATION
Review of Systems/Medical History  Patient summary reviewed  Chart reviewed      Cardiovascular  Exercise tolerance (METS): <4,  Hyperlipidemia, Hypertension controlled,    Pulmonary       GI/Hepatic    GERD well controlled,             Endo/Other  Diabetes poorly controlled type 2 Oral agent,      GYN       Hematology  Anemia ,     Musculoskeletal  Negative musculoskeletal ROS   Comment: paraplegic      Neurology  Negative neurology ROS   Motor deficit , paraplegia,    Psychology   Anxiety,              Physical Exam    Airway    Mallampati score: III  TM Distance: >3 FB  Neck ROM: full     Dental       Cardiovascular  Rhythm: regular, Rate: normal, Cardiovascular exam normal    Pulmonary  Pulmonary exam normal Breath sounds clear to auscultation,     Other Findings        Anesthesia Plan  ASA Score- 3     Anesthesia Type- general with ASA Monitors  Additional Monitors:   Airway Plan: LMA  Plan Factors- Patient instructed to abstain from smoking on day of procedure  Patient did not smoke on day of surgery  Induction- intravenous  Postoperative Plan-     Informed Consent- Anesthetic plan and risks discussed with patient

## 2019-10-31 NOTE — PLAN OF CARE
Problem: Potential for Falls  Goal: Patient will remain free of falls  Description  INTERVENTIONS:  - Assess patient frequently for physical needs  -  Identify cognitive and physical deficits and behaviors that affect risk of falls  -  [place] fall precautions as indicated by assessment   - Educate patient/family on patient safety including physical limitations  - Instruct patient to call for assistance with activity based on assessment  - Modify environment to reduce risk of injury  - Consider OT/PT consult to assist with strengthening/mobility  Outcome: Progressing     Problem: Prexisting or High Potential for Compromised Skin Integrity  Goal: Skin integrity is maintained or improved  Description  INTERVENTIONS:  - Identify patients at risk for skin breakdown  - Assess and monitor skin integrity  - Assess and monitor nutrition and hydration status  - Monitor labs   - Assess for incontinence   - Turn and reposition patient  - Assist with mobility/ambulation  - Relieve pressure over bony prominences  - Avoid friction and shearing  - Provide appropriate hygiene as needed including keeping skin clean and dry  - Evaluate need for skin moisturizer/barrier cream  - Collaborate with interdisciplinary team   - Patient/family teaching  - Consider wound care consult   Outcome: Progressing     Problem: Nutrition/Hydration-ADULT  Goal: Nutrient/Hydration intake appropriate for improving, restoring or maintaining nutritional needs  Description  Monitor and assess patient's nutrition/hydration status for malnutrition  Collaborate with interdisciplinary team and initiate plan and interventions as ordered  Monitor patient's weight and dietary intake as ordered or per policy  Utilize nutrition screening tool and intervene as necessary  Determine patient's food preferences and provide high-protein, high-caloric foods as appropriate       INTERVENTIONS:  - Monitor oral intake, urinary output, labs, and treatment plans  - Assess nutrition and hydration status and recommend course of action  - Evaluate amount of meals eaten  - Assist patient with eating if necessary   - Allow adequate time for meals  - Recommend/ encourage appropriate diets, oral nutritional supplements, and vitamin/mineral supplements  - Order, calculate, and assess calorie counts as needed  - Recommend, monitor, and adjust tube feedings and TPN/PPN based on assessed needs  - Assess need for intravenous fluids  - Provide specific nutrition/hydration education as appropriate  - Include patient/family/caregiver in decisions related to nutrition  Outcome: Progressing     Problem: PAIN - ADULT  Goal: Verbalizes/displays adequate comfort level or baseline comfort level  Description  Interventions:  - Encourage patient to monitor pain and request assistance  - Assess pain using appropriate pain scale  - Administer analgesics based on type and severity of pain and evaluate response  - Implement non-pharmacological measures as appropriate and evaluate response  - Consider cultural and social influences on pain and pain management  - Notify physician/advanced practitioner if interventions unsuccessful or patient reports new pain  Outcome: Progressing     Problem: INFECTION - ADULT  Goal: Absence or prevention of progression during hospitalization  Description  INTERVENTIONS:  - Assess and monitor for signs and symptoms of infection  - Monitor lab/diagnostic results  - Monitor all insertion sites, i e  indwelling lines, tubes, and drains  - Monitor endotracheal if appropriate and nasal secretions for changes in amount and color  - [place] appropriate cooling/warming therapies per order  - Administer medications as ordered  - Instruct and encourage patient and family to use good hand hygiene technique  - Identify and instruct in appropriate isolation precautions for identified infection/condition  Outcome: Progressing     Problem: SAFETY ADULT  Goal: Maintain or return to baseline ADL function  Description  INTERVENTIONS:  -  Assess patient's ability to carry out ADLs; assess patient's baseline for ADL function and identify physical deficits which impact ability to perform ADLs (bathing, care of mouth/teeth, toileting, grooming, dressing, etc )  - Assess/evaluate cause of self-care deficits   - Assess range of motion  - Assess patient's mobility; develop plan if impaired  - Assess patient's need for assistive devices and provide as appropriate  - Encourage maximum independence but intervene and supervise when necessary  - Involve family in performance of ADLs  - Assess for home care needs following discharge   - Consider OT consult to assist with ADL evaluation and planning for discharge  - Provide patient education as appropriate  Outcome: Progressing  Goal: Maintain or return mobility status to optimal level  Description  INTERVENTIONS:  - Assess patient's baseline mobility status (ambulation, transfers, stairs, etc )    - Identify cognitive and physical deficits and behaviors that affect mobility  - Identify mobility aids required to assist with transfers and/or ambulation (gait belt, sit-to-stand, lift, walker, cane, etc )  - [place] fall precautions as indicated by assessment  - Record patient progress and toleration of activity level on Mobility SBAR; progress patient to next Phase/Stage  - Instruct patient to call for assistance with activity based on assessment  - Consider rehabilitation consult to assist with strengthening/weightbearing, etc   Outcome: Progressing     Problem: DISCHARGE PLANNING  Goal: Discharge to home or other facility with appropriate resources  Description  INTERVENTIONS:  - Identify barriers to discharge w/patient and caregiver  - Arrange for needed discharge resources and transportation as appropriate  - Identify discharge learning needs (meds, wound care, etc )  - Arrange for interpretive services to assist at discharge as needed  - Refer to Case Management Department for coordinating discharge planning if the patient needs post-hospital services based on physician/advanced practitioner order or complex needs related to functional status, cognitive ability, or social support system  Outcome: Progressing     Problem: Knowledge Deficit  Goal: Patient/family/caregiver demonstrates understanding of disease process, treatment plan, medications, and discharge instructions  Description  Complete learning assessment and assess knowledge base    Interventions:  - Provide teaching at level of understanding  - Provide teaching via preferred learning methods  Outcome: Progressing     Problem: GASTROINTESTINAL - ADULT  Goal: Minimal or absence of nausea and/or vomiting  Description  INTERVENTIONS:  - Administer IV fluids if ordered to ensure adequate hydration  - Maintain NPO status until nausea and vomiting are resolved  - Nasogastric tube if ordered  - Administer ordered antiemetic medications as needed  - Provide nonpharmacologic comfort measures as appropriate  - Advance diet as tolerated, if ordered  - Consider nutrition services referral to assist patient with adequate nutrition and appropriate food choices  Outcome: Progressing  Goal: Maintains or returns to baseline bowel function  Description  INTERVENTIONS:  - Assess bowel function  - Encourage oral fluids to ensure adequate hydration  - Administer IV fluids if ordered to ensure adequate hydration  - Administer ordered medications as needed  - Encourage mobilization and activity  - Consider nutritional services referral to assist patient with adequate nutrition and appropriate food choices  Outcome: Progressing  Goal: Maintains adequate nutritional intake  Description  INTERVENTIONS:  - Monitor percentage of each meal consumed  - Identify factors contributing to decreased intake, treat as appropriate  - Assist with meals as needed  - Monitor I&O, weight, and lab values if indicated  - Obtain nutrition services referral as needed  Outcome: Progressing  Goal: Establish and maintain optimal ostomy function  Description  INTERVENTIONS:  - Assess bowel function  - Encourage oral fluids to ensure adequate hydration  - Administer IV fluids if ordered to ensure adequate hydration   - Administer ordered medications as needed  - Encourage mobilization and activity  - Nutrition services referral to assist patient with appropriate food choices  - Assess stoma site  - Consider wound care consult   Outcome: Progressing     Problem: GENITOURINARY - ADULT  Goal: Maintains or returns to baseline urinary function  Description  INTERVENTIONS:  - Assess urinary function  - Encourage oral fluids to ensure adequate hydration if ordered  - Administer IV fluids as ordered to ensure adequate hydration  - Administer ordered medications as needed  - Offer frequent toileting  - Follow urinary retention protocol if ordered  Outcome: Progressing  Goal: Absence of urinary retention  Description  INTERVENTIONS:  - Assess patients ability to void and empty bladder  - Monitor I/O  - Bladder scan as needed  - Discuss with physician/AP medications to alleviate retention as needed  - Discuss catheterization for long term situations as appropriate  Outcome: Progressing  Goal: Urinary catheter remains patent  Description  INTERVENTIONS:  - Assess patency of urinary catheter  - If patient has a chronic dangelo, consider changing catheter if non-functioning  - Follow guidelines for intermittent irrigation of non-functioning urinary catheter  Outcome: Progressing     Problem: SKIN/TISSUE INTEGRITY - ADULT  Goal: Skin integrity remains intact  Description  INTERVENTIONS  - Identify patients at risk for skin breakdown  - Assess and monitor skin integrity  - Assess and monitor nutrition and hydration status  - Monitor labs (i e  albumin)  - Assess for incontinence   - Turn and reposition patient  - Assist with mobility/ambulation  - Relieve pressure over bony prominences  - Avoid friction and shearing  - Provide appropriate hygiene as needed including keeping skin clean and dry  - Evaluate need for skin moisturizer/barrier cream  - Collaborate with interdisciplinary team (i e  Nutrition, Rehabilitation, etc )   - Patient/family teaching  Outcome: Progressing  Goal: Incision(s), wounds(s) or drain site(s) healing without S/S of infection  Description  INTERVENTIONS  - Assess and document risk factors for skin impairment   - Assess and document dressing, incision, wound bed, drain sites and surrounding tissue  - Consider nutrition services referral as needed  - Oral mucous membranes remain intact  - Provide patient/ family education  Outcome: Progressing  Goal: Oral mucous membranes remain intact  Description  INTERVENTIONS  - Assess oral mucosa and hygiene practices  - Implement preventative oral hygiene regimen  - Implement oral medicated treatments as ordered  - Initiate Nutrition services referral as needed  Outcome: Progressing     Problem: HEMATOLOGIC - ADULT  Goal: Maintains hematologic stability  Description  INTERVENTIONS  - Assess for signs and symptoms of bleeding or hemorrhage  - Monitor labs  - Administer supportive blood products/factors as ordered and appropriate  Outcome: Progressing     Problem: MUSCULOSKELETAL - ADULT  Goal: Maintain or return mobility to safest level of function  Description  INTERVENTIONS:  - Assess patient's ability to carry out ADLs; assess patient's baseline for ADL function and identify physical deficits which impact ability to perform ADLs (bathing, care of mouth/teeth, toileting, grooming, dressing, etc )  - Assess/evaluate cause of self-care deficits   - Assess range of motion  - Assess patient's mobility  - Assess patient's need for assistive devices and provide as appropriate  - Encourage maximum independence but intervene and supervise when necessary  - Involve family in performance of ADLs  - Assess for home care needs following discharge - Consider OT consult to assist with ADL evaluation and planning for discharge  - Provide patient education as appropriate  Outcome: Progressing  Goal: Maintain proper alignment of affected body part  Description  INTERVENTIONS:  - Support, maintain and protect limb and body alignment  - Provide patient/ family with appropriate education  Outcome: Progressing

## 2019-10-31 NOTE — ASSESSMENT & PLAN NOTE
Lab Results   Component Value Date    HGBA1C 5 2 05/06/2019     No results for input(s): POCGLU in the last 72 hours    Blood Sugar Average: Last 72 hrs:  (P) 82   · Check blood glucose 4 times daily as corrective insulin

## 2019-10-31 NOTE — ASSESSMENT & PLAN NOTE
· Chronic left decubitus ulcer recent with chronic osteomyelitis  · No evidence of acute infection  · Follow with wound care management plan and recommendation

## 2019-10-31 NOTE — ASSESSMENT & PLAN NOTE
· Hemoglobin stable and looks at baseline      Lab Results   Component Value Date    HGB 8 4 (L) 10/30/2019    HGB 8 1 (L) 10/29/2019    HGB 10 7 (L) 10/28/2019

## 2019-11-01 ENCOUNTER — ANESTHESIA (INPATIENT)
Dept: PERIOP | Facility: HOSPITAL | Age: 59
DRG: 987 | End: 2019-11-01
Payer: MEDICARE

## 2019-11-01 LAB
ANION GAP SERPL CALCULATED.3IONS-SCNC: 5 MMOL/L (ref 4–13)
BASOPHILS # BLD AUTO: 0.03 THOUSANDS/ΜL (ref 0–0.1)
BASOPHILS NFR BLD AUTO: 0 % (ref 0–1)
BUN SERPL-MCNC: 7 MG/DL (ref 5–25)
CALCIUM SERPL-MCNC: 8.5 MG/DL (ref 8.3–10.1)
CHLORIDE SERPL-SCNC: 103 MMOL/L (ref 100–108)
CO2 SERPL-SCNC: 29 MMOL/L (ref 21–32)
CREAT SERPL-MCNC: 0.4 MG/DL (ref 0.6–1.3)
EOSINOPHIL # BLD AUTO: 0.38 THOUSAND/ΜL (ref 0–0.61)
EOSINOPHIL NFR BLD AUTO: 5 % (ref 0–6)
ERYTHROCYTE [DISTWIDTH] IN BLOOD BY AUTOMATED COUNT: 18.8 % (ref 11.6–15.1)
GFR SERPL CREATININE-BSD FRML MDRD: 131 ML/MIN/1.73SQ M
GLUCOSE SERPL-MCNC: 73 MG/DL (ref 65–140)
GLUCOSE SERPL-MCNC: 86 MG/DL (ref 65–140)
GLUCOSE SERPL-MCNC: 87 MG/DL (ref 65–140)
GLUCOSE SERPL-MCNC: 92 MG/DL (ref 65–140)
HCT VFR BLD AUTO: 28.5 % (ref 36.5–49.3)
HGB BLD-MCNC: 8.3 G/DL (ref 12–17)
IMM GRANULOCYTES # BLD AUTO: 0.04 THOUSAND/UL (ref 0–0.2)
IMM GRANULOCYTES NFR BLD AUTO: 1 % (ref 0–2)
LYMPHOCYTES # BLD AUTO: 1.37 THOUSANDS/ΜL (ref 0.6–4.47)
LYMPHOCYTES NFR BLD AUTO: 19 % (ref 14–44)
MCH RBC QN AUTO: 23.6 PG (ref 26.8–34.3)
MCHC RBC AUTO-ENTMCNC: 29.1 G/DL (ref 31.4–37.4)
MCV RBC AUTO: 81 FL (ref 82–98)
MONOCYTES # BLD AUTO: 0.6 THOUSAND/ΜL (ref 0.17–1.22)
MONOCYTES NFR BLD AUTO: 9 % (ref 4–12)
NEUTROPHILS # BLD AUTO: 4.65 THOUSANDS/ΜL (ref 1.85–7.62)
NEUTS SEG NFR BLD AUTO: 66 % (ref 43–75)
NRBC BLD AUTO-RTO: 0 /100 WBCS
PLATELET # BLD AUTO: 387 THOUSANDS/UL (ref 149–390)
PMV BLD AUTO: 10.2 FL (ref 8.9–12.7)
POTASSIUM SERPL-SCNC: 3.2 MMOL/L (ref 3.5–5.3)
RBC # BLD AUTO: 3.52 MILLION/UL (ref 3.88–5.62)
SODIUM SERPL-SCNC: 137 MMOL/L (ref 136–145)
WBC # BLD AUTO: 7.07 THOUSAND/UL (ref 4.31–10.16)

## 2019-11-01 PROCEDURE — 87205 SMEAR GRAM STAIN: CPT | Performed by: UROLOGY

## 2019-11-01 PROCEDURE — 99233 SBSQ HOSP IP/OBS HIGH 50: CPT | Performed by: INTERNAL MEDICINE

## 2019-11-01 PROCEDURE — NC001 PR NO CHARGE: Performed by: PHYSICIAN ASSISTANT

## 2019-11-01 PROCEDURE — 82948 REAGENT STRIP/BLOOD GLUCOSE: CPT

## 2019-11-01 PROCEDURE — 54406 REMOVE MUTI-COMP PENIS PROS: CPT | Performed by: UROLOGY

## 2019-11-01 PROCEDURE — 0VPS0YZ REMOVAL OF OTHER DEVICE FROM PENIS, OPEN APPROACH: ICD-10-PCS | Performed by: UROLOGY

## 2019-11-01 PROCEDURE — 85025 COMPLETE CBC W/AUTO DIFF WBC: CPT | Performed by: INTERNAL MEDICINE

## 2019-11-01 PROCEDURE — 99232 SBSQ HOSP IP/OBS MODERATE 35: CPT | Performed by: INTERNAL MEDICINE

## 2019-11-01 PROCEDURE — 87176 TISSUE HOMOGENIZATION CULTR: CPT | Performed by: UROLOGY

## 2019-11-01 PROCEDURE — 87070 CULTURE OTHR SPECIMN AEROBIC: CPT | Performed by: UROLOGY

## 2019-11-01 PROCEDURE — 0VP80YZ REMOVAL OF OTHER DEVICE FROM SCROTUM AND TUNICA VAGINALIS, OPEN APPROACH: ICD-10-PCS | Performed by: UROLOGY

## 2019-11-01 PROCEDURE — 87075 CULTR BACTERIA EXCEPT BLOOD: CPT | Performed by: UROLOGY

## 2019-11-01 PROCEDURE — 80048 BASIC METABOLIC PNL TOTAL CA: CPT | Performed by: INTERNAL MEDICINE

## 2019-11-01 RX ORDER — SODIUM CHLORIDE 9 MG/ML
INJECTION, SOLUTION INTRAVENOUS AS NEEDED
Status: DISCONTINUED | OUTPATIENT
Start: 2019-11-01 | End: 2019-11-01 | Stop reason: HOSPADM

## 2019-11-01 RX ORDER — ONDANSETRON 2 MG/ML
4 INJECTION INTRAMUSCULAR; INTRAVENOUS ONCE AS NEEDED
Status: DISCONTINUED | OUTPATIENT
Start: 2019-11-01 | End: 2019-11-01 | Stop reason: HOSPADM

## 2019-11-01 RX ORDER — PROPOFOL 10 MG/ML
INJECTION, EMULSION INTRAVENOUS AS NEEDED
Status: DISCONTINUED | OUTPATIENT
Start: 2019-11-01 | End: 2019-11-01 | Stop reason: SURG

## 2019-11-01 RX ORDER — HEPARIN SODIUM 5000 [USP'U]/ML
5000 INJECTION, SOLUTION INTRAVENOUS; SUBCUTANEOUS EVERY 8 HOURS SCHEDULED
Status: DISCONTINUED | OUTPATIENT
Start: 2019-11-01 | End: 2019-11-05 | Stop reason: HOSPADM

## 2019-11-01 RX ORDER — ROCURONIUM BROMIDE 10 MG/ML
INJECTION, SOLUTION INTRAVENOUS AS NEEDED
Status: DISCONTINUED | OUTPATIENT
Start: 2019-11-01 | End: 2019-11-01 | Stop reason: SURG

## 2019-11-01 RX ORDER — FENTANYL CITRATE 50 UG/ML
INJECTION, SOLUTION INTRAMUSCULAR; INTRAVENOUS AS NEEDED
Status: DISCONTINUED | OUTPATIENT
Start: 2019-11-01 | End: 2019-11-01 | Stop reason: SURG

## 2019-11-01 RX ORDER — FENTANYL CITRATE/PF 50 MCG/ML
50 SYRINGE (ML) INJECTION
Status: DISCONTINUED | OUTPATIENT
Start: 2019-11-01 | End: 2019-11-01 | Stop reason: HOSPADM

## 2019-11-01 RX ORDER — NEOSTIGMINE METHYLSULFATE 1 MG/ML
INJECTION INTRAVENOUS AS NEEDED
Status: DISCONTINUED | OUTPATIENT
Start: 2019-11-01 | End: 2019-11-01 | Stop reason: SURG

## 2019-11-01 RX ORDER — GLYCOPYRROLATE 0.2 MG/ML
INJECTION INTRAMUSCULAR; INTRAVENOUS AS NEEDED
Status: DISCONTINUED | OUTPATIENT
Start: 2019-11-01 | End: 2019-11-01 | Stop reason: SURG

## 2019-11-01 RX ORDER — MIDAZOLAM HYDROCHLORIDE 2 MG/2ML
INJECTION, SOLUTION INTRAMUSCULAR; INTRAVENOUS AS NEEDED
Status: DISCONTINUED | OUTPATIENT
Start: 2019-11-01 | End: 2019-11-01 | Stop reason: SURG

## 2019-11-01 RX ADMIN — GLYCOPYRROLATE 0.4 MG: 0.2 INJECTION INTRAMUSCULAR; INTRAVENOUS at 16:42

## 2019-11-01 RX ADMIN — PHENYLEPHRINE HYDROCHLORIDE 50 MCG: 10 INJECTION INTRAVENOUS at 16:36

## 2019-11-01 RX ADMIN — ACETAMINOPHEN 650 MG: 325 TABLET ORAL at 23:24

## 2019-11-01 RX ADMIN — PHENYLEPHRINE HYDROCHLORIDE 50 MCG: 10 INJECTION INTRAVENOUS at 16:02

## 2019-11-01 RX ADMIN — VANCOMYCIN HYDROCHLORIDE 125 MG: 500 INJECTION, POWDER, LYOPHILIZED, FOR SOLUTION INTRAVENOUS at 09:20

## 2019-11-01 RX ADMIN — NEOSTIGMINE METHYLSULFATE 3 MG: 1 INJECTION, SOLUTION INTRAVENOUS at 16:42

## 2019-11-01 RX ADMIN — ERTAPENEM SODIUM 1000 MG: 1 INJECTION, POWDER, LYOPHILIZED, FOR SOLUTION INTRAMUSCULAR; INTRAVENOUS at 12:00

## 2019-11-01 RX ADMIN — OXYCODONE HYDROCHLORIDE 20 MG: 10 TABLET ORAL at 21:36

## 2019-11-01 RX ADMIN — PHENYLEPHRINE HYDROCHLORIDE 50 MCG: 10 INJECTION INTRAVENOUS at 16:13

## 2019-11-01 RX ADMIN — LIDOCAINE HYDROCHLORIDE 100 MG: 20 INJECTION, SOLUTION INTRAVENOUS at 15:29

## 2019-11-01 RX ADMIN — MORPHINE SULFATE 2 MG: 2 INJECTION, SOLUTION INTRAMUSCULAR; INTRAVENOUS at 03:27

## 2019-11-01 RX ADMIN — PROPOFOL 200 MG: 10 INJECTION, EMULSION INTRAVENOUS at 15:29

## 2019-11-01 RX ADMIN — ROCURONIUM BROMIDE 20 MG: 50 INJECTION, SOLUTION INTRAVENOUS at 16:18

## 2019-11-01 RX ADMIN — VANCOMYCIN HYDROCHLORIDE 125 MG: 500 INJECTION, POWDER, LYOPHILIZED, FOR SOLUTION INTRAVENOUS at 21:36

## 2019-11-01 RX ADMIN — HEPARIN SODIUM 5000 UNITS: 5000 INJECTION INTRAVENOUS; SUBCUTANEOUS at 21:37

## 2019-11-01 RX ADMIN — ONDANSETRON 4 MG: 2 INJECTION INTRAMUSCULAR; INTRAVENOUS at 15:45

## 2019-11-01 RX ADMIN — SODIUM CHLORIDE, SODIUM LACTATE, POTASSIUM CHLORIDE, AND CALCIUM CHLORIDE 75 ML/HR: .6; .31; .03; .02 INJECTION, SOLUTION INTRAVENOUS at 02:31

## 2019-11-01 RX ADMIN — FENTANYL CITRATE 25 MCG: 50 INJECTION, SOLUTION INTRAMUSCULAR; INTRAVENOUS at 16:42

## 2019-11-01 RX ADMIN — OXYCODONE HYDROCHLORIDE 20 MG: 10 TABLET ORAL at 09:20

## 2019-11-01 RX ADMIN — PHENYLEPHRINE HYDROCHLORIDE 50 MCG: 10 INJECTION INTRAVENOUS at 16:07

## 2019-11-01 RX ADMIN — PHENYLEPHRINE HYDROCHLORIDE 50 MCG: 10 INJECTION INTRAVENOUS at 15:48

## 2019-11-01 RX ADMIN — PHENYLEPHRINE HYDROCHLORIDE 50 MCG: 10 INJECTION INTRAVENOUS at 16:28

## 2019-11-01 RX ADMIN — MIDAZOLAM 2 MG: 1 INJECTION INTRAMUSCULAR; INTRAVENOUS at 15:29

## 2019-11-01 RX ADMIN — ROCURONIUM BROMIDE 30 MG: 50 INJECTION, SOLUTION INTRAVENOUS at 15:29

## 2019-11-01 RX ADMIN — PHENYLEPHRINE HYDROCHLORIDE 100 MCG: 10 INJECTION INTRAVENOUS at 15:40

## 2019-11-01 RX ADMIN — FENTANYL CITRATE 50 MCG: 50 INJECTION, SOLUTION INTRAMUSCULAR; INTRAVENOUS at 16:39

## 2019-11-01 RX ADMIN — FENTANYL CITRATE 100 MCG: 50 INJECTION, SOLUTION INTRAMUSCULAR; INTRAVENOUS at 15:29

## 2019-11-01 RX ADMIN — PANTOPRAZOLE SODIUM 20 MG: 20 TABLET, DELAYED RELEASE ORAL at 05:17

## 2019-11-01 RX ADMIN — FENTANYL CITRATE 25 MCG: 50 INJECTION, SOLUTION INTRAMUSCULAR; INTRAVENOUS at 16:19

## 2019-11-01 RX ADMIN — SODIUM CHLORIDE, SODIUM LACTATE, POTASSIUM CHLORIDE, AND CALCIUM CHLORIDE 75 ML/HR: .6; .31; .03; .02 INJECTION, SOLUTION INTRAVENOUS at 17:28

## 2019-11-01 RX ADMIN — MORPHINE SULFATE 2 MG: 2 INJECTION, SOLUTION INTRAMUSCULAR; INTRAVENOUS at 19:54

## 2019-11-01 NOTE — OP NOTE
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, subsequent encounter [T83 61XD]  Malfunction of penile prosthesis, subsequent encounter [T83 490D]    Post-Op Diagnosis Codes:     * UTI (urinary tract infection) [N39 0]     * 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, subsequent encounter [T83 61XD]     * Malfunction of penile prosthesis, subsequent encounter [T83 490D]    Procedure(s) (LRB):  REMOVAL PROSTHESIS PENILE (N/A)    Specimen(s):  ID Type Source Tests Collected by Time Destination   A : wound culture Tissue Wound ANAEROBIC CULTURE AND GRAM STAIN, CULTURE, TISSUE AND GRAM STAIN Koffi Paige MD 2019 [id number]        Estimated Blood Loss:   Minimal    Drains:  Closed/Suction Drain Anterior Other (Comment) Bulb 19 Fr  (Active)   Number of days: 0       Colostomy Descending/sigmoid LLQ (Active)   Stomal Appliance 2 piece 2019 12:08 PM   Stoma Assessment [place] 2019 12:08 PM   Stoma Shape Round 2019 12:08 PM   Peristomal Assessment Clean; Intact 2019 12:08 PM   Treatment Placement checked; Site care 2019 12:08 PM   Output (mL) 125 mL 2019 12:08 PM   Number of days:        Suprapubic Catheter Non-latex 16 Fr  (Active)   Site Assessment Clean; Intact 2019 12:08 PM   Dressing Status Open to air 2019 12:08 PM   Dressing Type Split gauze 2019 12:53 AM   Collection Container Standard drainage bag 2019 12:08 PM   Suprapubic Irrigation Intake (mL) 60 mL 10/31/2019  4:12 AM   Output (mL) 1300 mL 2019 12:20 PM   Number of days: 43       Anesthesia Type:   Choice    Operative Indications:  UTI (urinary tract infection) [N39 0]  Proximal penile cylinder and rear tip extender in left corpora erosion through sacral decubitus ulcer  Malfunction of penile prosthesis, subsequent encounter [T83 490D]       Operative Findings:  See operative note    Complications:   None    Procedure and Technique:  The patient and I spoke at the bedside in detail concerning the operative procedure  The patient was then taken to the operating room placed under general anesthesia supine on the operating table  Prior to prepping and draping the patient the patient was rolled on his right side exposing the left-sided sacral decubitus ulcer and the packing in that ulcer was removed exposing the rear tip extender and proximal portion of a solitary left penile implant cylinder  A sponge stick was placed on the proximal portion of the cylinder and rear tip extender so that this could be removed once tubing to the cylinder was cut trans scrotal E  The patient was then rolled back in the supine position prepped and draped usual sterile fashion  Using a 15 scalpel blade a median raphae a longitudinal penoscrotal incision was made using a 15 scalpel blade  Electric cautery was used to cut down to the penile implant pump within the scrotum  There was no evidence of purulence or inflammatory reaction  Aerobic and anaerobic cultures were obtained  Using the electric cautery the capsule around the pump was incised allowing the pump to be delivered from the incision  Tubing from the pump to the left cylinder was tracked and dissected free using the electric cautery and at the level of the corpora was divided sharply  At this point Dr Arlet Saha was able to pull the were tip extender and then the cylinder out through the contaminated sacral decubitus ulcer cavity avoiding the need to pull the cylinder back into the scrotal compartment avoiding contamination with bacteria    Once the cylinder rear tip extender were removed the tubing leading from the scrotal pump to a penile implant reservoir was tracked and dissected using the electric cautery until the reservoir was removed in its entirety  There was no evidence purulence or infection related to the penile implant reservoir  The incision and all spaces were vigorously irrigated with bacitracin and gentamicin a suction Sanjay-Monahan drain placed through a separate stab incision on the left side of the scrotum and affixed to the skin using 3 O chromic suture  Dartos fascia was closed using running 2 0 Vicryl suture with knots buried and skin was closed using 4 0 Monocryl subcuticular suture  History grow was placed on the scrotal incision  The patient was extubated and transferred to the recovery room in stable condition    All needle sponge and instrument counts were reported to me as being correct  I was present for the entire procedure and A qualified resident physician was not available    Patient Disposition:  PACU     SIGNATURE: Richard Pettit MD  DATE: November 1, 2019  TIME: 5:02 PM

## 2019-11-01 NOTE — ASSESSMENT & PLAN NOTE
3 Days Post-Op  Procedure(s):  REMOVAL PROSTHESIS PENILE  Surgeon(s):  MD Susy Cochran PA-C Gearline Merchant, MD  11/1/2019    Doing great  Drain removed at the bedside today  OK to discharge home from Urology standpoint  Outpatient follow up

## 2019-11-01 NOTE — INTERVAL H&P NOTE
H&P reviewed  After examining the patient I find no changes in the patients condition since the H&P had been written  /80   Pulse 71   Temp 98 3 °F (36 8 °C)   Resp 16   Ht 5' 7" (1 702 m)   Wt 66 7 kg (147 lb 0 8 oz)   SpO2 96%   BMI 23 03 kg/m²       Vitals:    11/01/19 0800   BP: 131/80   Pulse: 71   Resp: 16   Temp: 98 3 °F (36 8 °C)   SpO2: 96%

## 2019-11-01 NOTE — TREATMENT PLAN
Treatment Plan - Urology   Frankie Nicole Moscat 63 y o  male MRN: [id number]  Unit/Bed#: [facility] 68 2 -01 Encounter: [id number]    Treatment Plan:  Plan to proceed to the Operating Room tomorrow for explantation of IPP implant with Dr Jc Becker Flair is NPO p midnight in preparation  We will plan to hold his heparin after midnight  I spoke with Marcelina Aragon RN caring for patient at this time and verbally confirmed NPO status and holding of heparin as above       Jenny Hernandez PA-C  Date: 10/31/2019 Time: 8:25 PM

## 2019-11-01 NOTE — PLAN OF CARE
Problem: Potential for Falls  Goal: Patient will remain free of falls  Description  INTERVENTIONS:  - Assess patient frequently for physical needs  -  Identify cognitive and physical deficits and behaviors that affect risk of falls  -  [place] fall precautions as indicated by assessment   - Educate patient/family on patient safety including physical limitations  - Instruct patient to call for assistance with activity based on assessment  - Modify environment to reduce risk of injury  - Consider OT/PT consult to assist with strengthening/mobility  Outcome: Progressing     Problem: Prexisting or High Potential for Compromised Skin Integrity  Goal: Skin integrity is maintained or improved  Description  INTERVENTIONS:  - Identify patients at risk for skin breakdown  - Assess and monitor skin integrity  - Assess and monitor nutrition and hydration status  - Monitor labs   - Assess for incontinence   - Turn and reposition patient  - Assist with mobility/ambulation  - Relieve pressure over bony prominences  - Avoid friction and shearing  - Provide appropriate hygiene as needed including keeping skin clean and dry  - Evaluate need for skin moisturizer/barrier cream  - Collaborate with interdisciplinary team   - Patient/family teaching  - Consider wound care consult   Outcome: Progressing     Problem: Nutrition/Hydration-ADULT  Goal: Nutrient/Hydration intake appropriate for improving, restoring or maintaining nutritional needs  Description  Monitor and assess patient's nutrition/hydration status for malnutrition  Collaborate with interdisciplinary team and initiate plan and interventions as ordered  Monitor patient's weight and dietary intake as ordered or per policy  Utilize nutrition screening tool and intervene as necessary  Determine patient's food preferences and provide high-protein, high-caloric foods as appropriate       INTERVENTIONS:  - Monitor oral intake, urinary output, labs, and treatment plans  - Assess nutrition and hydration status and recommend course of action  - Evaluate amount of meals eaten  - Assist patient with eating if necessary   - Allow adequate time for meals  - Recommend/ encourage appropriate diets, oral nutritional supplements, and vitamin/mineral supplements  - Order, calculate, and assess calorie counts as needed  - Recommend, monitor, and adjust tube feedings and TPN/PPN based on assessed needs  - Assess need for intravenous fluids  - Provide specific nutrition/hydration education as appropriate  - Include patient/family/caregiver in decisions related to nutrition  Outcome: Progressing     Problem: PAIN - ADULT  Goal: Verbalizes/displays adequate comfort level or baseline comfort level  Description  Interventions:  - Encourage patient to monitor pain and request assistance  - Assess pain using appropriate pain scale  - Administer analgesics based on type and severity of pain and evaluate response  - Implement non-pharmacological measures as appropriate and evaluate response  - Consider cultural and social influences on pain and pain management  - Notify physician/advanced practitioner if interventions unsuccessful or patient reports new pain  Outcome: Progressing     Problem: INFECTION - ADULT  Goal: Absence or prevention of progression during hospitalization  Description  INTERVENTIONS:  - Assess and monitor for signs and symptoms of infection  - Monitor lab/diagnostic results  - Monitor all insertion sites, i e  indwelling lines, tubes, and drains  - Monitor endotracheal if appropriate and nasal secretions for changes in amount and color  - [place] appropriate cooling/warming therapies per order  - Administer medications as ordered  - Instruct and encourage patient and family to use good hand hygiene technique  - Identify and instruct in appropriate isolation precautions for identified infection/condition  Outcome: Progressing     Problem: SAFETY ADULT  Goal: Maintain or return to baseline ADL function  Description  INTERVENTIONS:  -  Assess patient's ability to carry out ADLs; assess patient's baseline for ADL function and identify physical deficits which impact ability to perform ADLs (bathing, care of mouth/teeth, toileting, grooming, dressing, etc )  - Assess/evaluate cause of self-care deficits   - Assess range of motion  - Assess patient's mobility; develop plan if impaired  - Assess patient's need for assistive devices and provide as appropriate  - Encourage maximum independence but intervene and supervise when necessary  - Involve family in performance of ADLs  - Assess for home care needs following discharge   - Consider OT consult to assist with ADL evaluation and planning for discharge  - Provide patient education as appropriate  Outcome: Progressing  Goal: Maintain or return mobility status to optimal level  Description  INTERVENTIONS:  - Assess patient's baseline mobility status (ambulation, transfers, stairs, etc )    - Identify cognitive and physical deficits and behaviors that affect mobility  - Identify mobility aids required to assist with transfers and/or ambulation (gait belt, sit-to-stand, lift, walker, cane, etc )  - [place] fall precautions as indicated by assessment  - Record patient progress and toleration of activity level on Mobility SBAR; progress patient to next Phase/Stage  - Instruct patient to call for assistance with activity based on assessment  - Consider rehabilitation consult to assist with strengthening/weightbearing, etc   Outcome: Progressing     Problem: DISCHARGE PLANNING  Goal: Discharge to home or other facility with appropriate resources  Description  INTERVENTIONS:  - Identify barriers to discharge w/patient and caregiver  - Arrange for needed discharge resources and transportation as appropriate  - Identify discharge learning needs (meds, wound care, etc )  - Arrange for interpretive services to assist at discharge as needed  - Refer to Case Management Department for coordinating discharge planning if the patient needs post-hospital services based on physician/advanced practitioner order or complex needs related to functional status, cognitive ability, or social support system  Outcome: Progressing     Problem: Knowledge Deficit  Goal: Patient/family/caregiver demonstrates understanding of disease process, treatment plan, medications, and discharge instructions  Description  Complete learning assessment and assess knowledge base    Interventions:  - Provide teaching at level of understanding  - Provide teaching via preferred learning methods  Outcome: Progressing     Problem: GASTROINTESTINAL - ADULT  Goal: Minimal or absence of nausea and/or vomiting  Description  INTERVENTIONS:  - Administer IV fluids if ordered to ensure adequate hydration  - Maintain NPO status until nausea and vomiting are resolved  - Nasogastric tube if ordered  - Administer ordered antiemetic medications as needed  - Provide nonpharmacologic comfort measures as appropriate  - Advance diet as tolerated, if ordered  - Consider nutrition services referral to assist patient with adequate nutrition and appropriate food choices  Outcome: Progressing  Goal: Maintains or returns to baseline bowel function  Description  INTERVENTIONS:  - Assess bowel function  - Encourage oral fluids to ensure adequate hydration  - Administer IV fluids if ordered to ensure adequate hydration  - Administer ordered medications as needed  - Encourage mobilization and activity  - Consider nutritional services referral to assist patient with adequate nutrition and appropriate food choices  Outcome: Progressing  Goal: Maintains adequate nutritional intake  Description  INTERVENTIONS:  - Monitor percentage of each meal consumed  - Identify factors contributing to decreased intake, treat as appropriate  - Assist with meals as needed  - Monitor I&O, weight, and lab values if indicated  - Obtain nutrition services referral as needed  Outcome: Progressing  Goal: Establish and maintain optimal ostomy function  Description  INTERVENTIONS:  - Assess bowel function  - Encourage oral fluids to ensure adequate hydration  - Administer IV fluids if ordered to ensure adequate hydration   - Administer ordered medications as needed  - Encourage mobilization and activity  - Nutrition services referral to assist patient with appropriate food choices  - Assess stoma site  - Consider wound care consult   Outcome: Progressing     Problem: GENITOURINARY - ADULT  Goal: Maintains or returns to baseline urinary function  Description  INTERVENTIONS:  - Assess urinary function  - Encourage oral fluids to ensure adequate hydration if ordered  - Administer IV fluids as ordered to ensure adequate hydration  - Administer ordered medications as needed  - Offer frequent toileting  - Follow urinary retention protocol if ordered  Outcome: Progressing  Goal: Absence of urinary retention  Description  INTERVENTIONS:  - Assess patients ability to void and empty bladder  - Monitor I/O  - Bladder scan as needed  - Discuss with physician/AP medications to alleviate retention as needed  - Discuss catheterization for long term situations as appropriate  Outcome: Progressing  Goal: Urinary catheter remains patent  Description  INTERVENTIONS:  - Assess patency of urinary catheter  - If patient has a chronic dangelo, consider changing catheter if non-functioning  - Follow guidelines for intermittent irrigation of non-functioning urinary catheter  Outcome: Progressing     Problem: SKIN/TISSUE INTEGRITY - ADULT  Goal: Skin integrity remains intact  Description  INTERVENTIONS  - Identify patients at risk for skin breakdown  - Assess and monitor skin integrity  - Assess and monitor nutrition and hydration status  - Monitor labs (i e  albumin)  - Assess for incontinence   - Turn and reposition patient  - Assist with mobility/ambulation  - Relieve pressure over bony prominences  - Avoid friction and shearing  - Provide appropriate hygiene as needed including keeping skin clean and dry  - Evaluate need for skin moisturizer/barrier cream  - Collaborate with interdisciplinary team (i e  Nutrition, Rehabilitation, etc )   - Patient/family teaching  Outcome: Progressing  Goal: Incision(s), wounds(s) or drain site(s) healing without S/S of infection  Description  INTERVENTIONS  - Assess and document risk factors for skin impairment   - Assess and document dressing, incision, wound bed, drain sites and surrounding tissue  - Consider nutrition services referral as needed  - Oral mucous membranes remain intact  - Provide patient/ family education  Outcome: Progressing  Goal: Oral mucous membranes remain intact  Description  INTERVENTIONS  - Assess oral mucosa and hygiene practices  - Implement preventative oral hygiene regimen  - Implement oral medicated treatments as ordered  - Initiate Nutrition services referral as needed  Outcome: Progressing     Problem: HEMATOLOGIC - ADULT  Goal: Maintains hematologic stability  Description  INTERVENTIONS  - Assess for signs and symptoms of bleeding or hemorrhage  - Monitor labs  - Administer supportive blood products/factors as ordered and appropriate  Outcome: Progressing     Problem: MUSCULOSKELETAL - ADULT  Goal: Maintain or return mobility to safest level of function  Description  INTERVENTIONS:  - Assess patient's ability to carry out ADLs; assess patient's baseline for ADL function and identify physical deficits which impact ability to perform ADLs (bathing, care of mouth/teeth, toileting, grooming, dressing, etc )  - Assess/evaluate cause of self-care deficits   - Assess range of motion  - Assess patient's mobility  - Assess patient's need for assistive devices and provide as appropriate  - Encourage maximum independence but intervene and supervise when necessary  - Involve family in performance of ADLs  - Assess for home care needs following discharge - Consider OT consult to assist with ADL evaluation and planning for discharge  - Provide patient education as appropriate  Outcome: Progressing  Goal: Maintain proper alignment of affected body part  Description  INTERVENTIONS:  - Support, maintain and protect limb and body alignment  - Provide patient/ family with appropriate education  Outcome: Progressing

## 2019-11-01 NOTE — PROGRESS NOTES
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 in patient's chronic buttock decubitus  Consider secondary to ileus versus small-bowel obstruction although this is an unlikely source of fever  Also consider intra-abdominal/pelvic infection as 2 fluid collections were noted in the R renal pelvis on CT scan  Chest x-ray was negative for acute cardiopulmonary findings   Blood cultures are negative after four days   Fortunately the patient remains clinically stable and nontoxic   His fever curve has trended down  WBC count has normalized  The patient is currently receiving IV Ertapenem and is tolerating without difficulty  -continue IV ertapenem through 11/05/2019 for total of seven days of appropriate antibiotic treatment  -monitor CBC and BMP  -follow up blood cultures  -monitor vitals  -supportive care     2  Possible ESBL E coli UTI  UA collected from suprapubic tube did appear abnormal  He unfortunately has a 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, MRSA, and e coli ESBL  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  New concern with further erosion as the penile prosthesis is now exposed in the wound base  Unclear if urinary bacteria are playing a part in this erosion    -antibiotic as above  -monitor CBC and BMP  -monitor vitals  -serial suprapubic tube examination and care  -monitor urine output     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 implant noted in left buttock decubitus ulcer   Urology is following the patient closely   He is scheduled for explant of the prosthesis later today   -continue close follow-up with Urology     4  Small bowel obstruction verses ileus  CT of the abdomen and pelvis was concerning for dilation of his proximal and mid jejunum consistent with a bowel obstruction  Two small fluid collections were also noted within the right pelvis which were new when compared to previous pelvic imaging   He he reported no output from his ostomy since 10/26/2019 but began seeing stool output again on 10/29/2019  Yunior Guillaume continues to follow closely with General surgery   -serial abdominal exams  -serial ostomy examination and care  -monitor GI symptoms  -monitor stool output  -continue close follow up with general surgery     5  Chronic left buttock decubitus ulceration   POA   With chronic underlying osteomyelitis   Now with exposed penile prosthesis in the wound base  Wound has had no purulent drainage and has not appeared cellulitic on exam  Wound management is following and have made wound care recommendations  Implant is scheduled for removal this afternoon    -serial wound exams  -local wound care per wound management team  -continue follow up with the wound management team     6  History of C diff   Patient currently without output in his ostomy   Concern for small bowel obstruction versus ileus  He will still require PO vancomycin prophylaxis as he receives above antibiotics and for 72 hours after their completion   -continue PO vancomycin prophylaxis through 11/08/2019  -monitor stool output  -monitor abdominal symptoms     7  History of resistant organisms including ESBL E coli, MRSA, and Pseudomonas      8  Paraplegia  Above plan was discussed in detail with patient at the bedside  Above plan was discussed in detail with [facility] attending, Dr Deandre Karimi  We will to continue to follow along with the patient  He will be formally reassessed by the Infectious Disease team on Monday, 11/04/2019  Please call sooner with questions      Antibiotics:  Ertapenem 3  Antibiotics 5    Subjective:  Patient reports he is doing well today  Is still having some pain in his lower abdomen but states it is the same as yesterday  Is still seen good stool output in the ostomy pouch  Was previously tolerating PO intake without difficulty but is now NPO for surgery  He has no fever, chills, sweats, shakes; no nausea or vomiting; no cough, shortness of breath, or chest pain  No new symptoms  Objective:  Vitals:  Temp:  [97 8 °F (36 6 °C)-99 5 °F (37 5 °C)] 98 3 °F (36 8 °C)  HR:  [71-89] 71  Resp:  [16-18] 16  BP: (112-131)/(75-90) 131/80  SpO2:  [91 %-97 %] 96 %  Temp (24hrs), Av 5 °F (36 9 °C), Min:97 8 °F (36 6 °C), Max:99 5 °F (37 5 °C)  Current: Temperature: 98 3 °F (36 8 °C)    Physical Exam:   General Appearance:  Alert, interactive, nontoxic, no acute distress  Throat: Oropharynx moist without lesions  Lungs:   Clear to auscultation bilaterally; respirations unlabored   Heart:  RRR; no murmur, rub or gallop   Abdomen:   Soft, mildly distended; hypoactive bowel sounds  Ostomy pouch with small soft stool output  Suprapubic catheter intact, urine output is light yellow with small white sediment   Back: Did not roll patient and remove dressing to his buttock ulcers as he is going down to the OR   Extremities: No clubbing, cyanosis, or edema of left lower extremity  Right BKA  Skin: No new rashes, lesions, or draining wounds noted on exposed skin       Labs, Imaging, & Other studies:   All pertinent labs and imaging studies were personally reviewed  Results from last 7 days   Lab Units 19  0504 10/30/19  0523 10/29/19  0537   WBC Thousand/uL 7 07 6 86 6 89   HEMOGLOBIN g/dL 8 3* 8 4* 8 1*   PLATELETS Thousands/uL 387 360 334     Results from last 7 days   Lab Units 19  0504  10/29/19  0537 10/28/19  0451   POTASSIUM mmol/L 3 2*   < > 3 2* 3 5   CHLORIDE mmol/L 103   < > 104 98*   CO2 mmol/L 29   < > 20* 24   BUN mg/dL 7   < > 14 23   CREATININE mg/dL 0 40*   < > 0 57* 0 86   EGFR ml/min/1 73sq m 131   < > 113 96   CALCIUM mg/dL 8 5   < > 8 4 9 0   AST U/L  --   --  25 54*  51*   ALT U/L  --   --  7* 13  14   ALK PHOS U/L  --   --  63 80  86    < > = values in this interval not displayed  Results from last 7 days   Lab Units 10/28/19  0608 10/28/19  0406 10/28/19  0400   BLOOD CULTURE   --  No Growth After 4 Days  No Growth After 4 Days     URINE CULTURE  >100,000 cfu/ml Escherichia coli ESBL*  10,000-19,000 cfu/ml Pseudomonas aeruginosa*  <10,000 cfu/ml Enterococcus species*  --   --

## 2019-11-01 NOTE — ASSESSMENT & PLAN NOTE
Lab Results   Component Value Date    HGBA1C 5 2 05/06/2019     Recent Labs     11/01/19  0519 11/01/19  1215 11/01/19  1719   POCGLU 87 92 73     Blood Sugar Average: Last 72 hrs:  (P) 84   · Chest blood glucose 1 times daily

## 2019-11-01 NOTE — PLAN OF CARE
Problem: Potential for Falls  Goal: Patient will remain free of falls  Description  INTERVENTIONS:  - Assess patient frequently for physical needs  -  Identify cognitive and physical deficits and behaviors that affect risk of falls  -  [place] fall precautions as indicated by assessment   - Educate patient/family on patient safety including physical limitations  - Instruct patient to call for assistance with activity based on assessment  - Modify environment to reduce risk of injury  - Consider OT/PT consult to assist with strengthening/mobility  Outcome: Progressing     Problem: Prexisting or High Potential for Compromised Skin Integrity  Goal: Skin integrity is maintained or improved  Description  INTERVENTIONS:  - Identify patients at risk for skin breakdown  - Assess and monitor skin integrity  - Assess and monitor nutrition and hydration status  - Monitor labs   - Assess for incontinence   - Turn and reposition patient  - Assist with mobility/ambulation  - Relieve pressure over bony prominences  - Avoid friction and shearing  - Provide appropriate hygiene as needed including keeping skin clean and dry  - Evaluate need for skin moisturizer/barrier cream  - Collaborate with interdisciplinary team   - Patient/family teaching  - Consider wound care consult   Outcome: Progressing     Problem: Nutrition/Hydration-ADULT  Goal: Nutrient/Hydration intake appropriate for improving, restoring or maintaining nutritional needs  Description  Monitor and assess patient's nutrition/hydration status for malnutrition  Collaborate with interdisciplinary team and initiate plan and interventions as ordered  Monitor patient's weight and dietary intake as ordered or per policy  Utilize nutrition screening tool and intervene as necessary  Determine patient's food preferences and provide high-protein, high-caloric foods as appropriate       INTERVENTIONS:  - Monitor oral intake, urinary output, labs, and treatment plans  - Assess nutrition and hydration status and recommend course of action  - Evaluate amount of meals eaten  - Assist patient with eating if necessary   - Allow adequate time for meals  - Recommend/ encourage appropriate diets, oral nutritional supplements, and vitamin/mineral supplements  - Order, calculate, and assess calorie counts as needed  - Recommend, monitor, and adjust tube feedings and TPN/PPN based on assessed needs  - Assess need for intravenous fluids  - Provide specific nutrition/hydration education as appropriate  - Include patient/family/caregiver in decisions related to nutrition  Outcome: Progressing     Problem: PAIN - ADULT  Goal: Verbalizes/displays adequate comfort level or baseline comfort level  Description  Interventions:  - Encourage patient to monitor pain and request assistance  - Assess pain using appropriate pain scale  - Administer analgesics based on type and severity of pain and evaluate response  - Implement non-pharmacological measures as appropriate and evaluate response  - Consider cultural and social influences on pain and pain management  - Notify physician/advanced practitioner if interventions unsuccessful or patient reports new pain  Outcome: Progressing     Problem: INFECTION - ADULT  Goal: Absence or prevention of progression during hospitalization  Description  INTERVENTIONS:  - Assess and monitor for signs and symptoms of infection  - Monitor lab/diagnostic results  - Monitor all insertion sites, i e  indwelling lines, tubes, and drains  - Monitor endotracheal if appropriate and nasal secretions for changes in amount and color  - [place] appropriate cooling/warming therapies per order  - Administer medications as ordered  - Instruct and encourage patient and family to use good hand hygiene technique  - Identify and instruct in appropriate isolation precautions for identified infection/condition  Outcome: Progressing     Problem: SAFETY ADULT  Goal: Maintain or return to baseline ADL function  Description  INTERVENTIONS:  -  Assess patient's ability to carry out ADLs; assess patient's baseline for ADL function and identify physical deficits which impact ability to perform ADLs (bathing, care of mouth/teeth, toileting, grooming, dressing, etc )  - Assess/evaluate cause of self-care deficits   - Assess range of motion  - Assess patient's mobility; develop plan if impaired  - Assess patient's need for assistive devices and provide as appropriate  - Encourage maximum independence but intervene and supervise when necessary  - Involve family in performance of ADLs  - Assess for home care needs following discharge   - Consider OT consult to assist with ADL evaluation and planning for discharge  - Provide patient education as appropriate  Outcome: Progressing  Goal: Maintain or return mobility status to optimal level  Description  INTERVENTIONS:  - Assess patient's baseline mobility status (ambulation, transfers, stairs, etc )    - Identify cognitive and physical deficits and behaviors that affect mobility  - Identify mobility aids required to assist with transfers and/or ambulation (gait belt, sit-to-stand, lift, walker, cane, etc )  - [place] fall precautions as indicated by assessment  - Record patient progress and toleration of activity level on Mobility SBAR; progress patient to next Phase/Stage  - Instruct patient to call for assistance with activity based on assessment  - Consider rehabilitation consult to assist with strengthening/weightbearing, etc   Outcome: Progressing     Problem: DISCHARGE PLANNING  Goal: Discharge to home or other facility with appropriate resources  Description  INTERVENTIONS:  - Identify barriers to discharge w/patient and caregiver  - Arrange for needed discharge resources and transportation as appropriate  - Identify discharge learning needs (meds, wound care, etc )  - Arrange for interpretive services to assist at discharge as needed  - Refer to Case Management Department for coordinating discharge planning if the patient needs post-hospital services based on physician/advanced practitioner order or complex needs related to functional status, cognitive ability, or social support system  Outcome: Progressing     Problem: Knowledge Deficit  Goal: Patient/family/caregiver demonstrates understanding of disease process, treatment plan, medications, and discharge instructions  Description  Complete learning assessment and assess knowledge base    Interventions:  - Provide teaching at level of understanding  - Provide teaching via preferred learning methods  Outcome: Progressing     Problem: GASTROINTESTINAL - ADULT  Goal: Minimal or absence of nausea and/or vomiting  Description  INTERVENTIONS:  - Administer IV fluids if ordered to ensure adequate hydration  - Maintain NPO status until nausea and vomiting are resolved  - Nasogastric tube if ordered  - Administer ordered antiemetic medications as needed  - Provide nonpharmacologic comfort measures as appropriate  - Advance diet as tolerated, if ordered  - Consider nutrition services referral to assist patient with adequate nutrition and appropriate food choices  Outcome: Progressing  Goal: Maintains or returns to baseline bowel function  Description  INTERVENTIONS:  - Assess bowel function  - Encourage oral fluids to ensure adequate hydration  - Administer IV fluids if ordered to ensure adequate hydration  - Administer ordered medications as needed  - Encourage mobilization and activity  - Consider nutritional services referral to assist patient with adequate nutrition and appropriate food choices  Outcome: Progressing  Goal: Maintains adequate nutritional intake  Description  INTERVENTIONS:  - Monitor percentage of each meal consumed  - Identify factors contributing to decreased intake, treat as appropriate  - Assist with meals as needed  - Monitor I&O, weight, and lab values if indicated  - Obtain nutrition services referral as needed  Outcome: Progressing  Goal: Establish and maintain optimal ostomy function  Description  INTERVENTIONS:  - Assess bowel function  - Encourage oral fluids to ensure adequate hydration  - Administer IV fluids if ordered to ensure adequate hydration   - Administer ordered medications as needed  - Encourage mobilization and activity  - Nutrition services referral to assist patient with appropriate food choices  - Assess stoma site  - Consider wound care consult   Outcome: Progressing     Problem: GENITOURINARY - ADULT  Goal: Maintains or returns to baseline urinary function  Description  INTERVENTIONS:  - Assess urinary function  - Encourage oral fluids to ensure adequate hydration if ordered  - Administer IV fluids as ordered to ensure adequate hydration  - Administer ordered medications as needed  - Offer frequent toileting  - Follow urinary retention protocol if ordered  Outcome: Progressing  Goal: Absence of urinary retention  Description  INTERVENTIONS:  - Assess patients ability to void and empty bladder  - Monitor I/O  - Bladder scan as needed  - Discuss with physician/AP medications to alleviate retention as needed  - Discuss catheterization for long term situations as appropriate  Outcome: Progressing  Goal: Urinary catheter remains patent  Description  INTERVENTIONS:  - Assess patency of urinary catheter  - If patient has a chronic dangelo, consider changing catheter if non-functioning  - Follow guidelines for intermittent irrigation of non-functioning urinary catheter  Outcome: Progressing     Problem: SKIN/TISSUE INTEGRITY - ADULT  Goal: Skin integrity remains intact  Description  INTERVENTIONS  - Identify patients at risk for skin breakdown  - Assess and monitor skin integrity  - Assess and monitor nutrition and hydration status  - Monitor labs (i e  albumin)  - Assess for incontinence   - Turn and reposition patient  - Assist with mobility/ambulation  - Relieve pressure over bony prominences  - Avoid friction and shearing  - Provide appropriate hygiene as needed including keeping skin clean and dry  - Evaluate need for skin moisturizer/barrier cream  - Collaborate with interdisciplinary team (i e  Nutrition, Rehabilitation, etc )   - Patient/family teaching  Outcome: Progressing  Goal: Incision(s), wounds(s) or drain site(s) healing without S/S of infection  Description  INTERVENTIONS  - Assess and document risk factors for skin impairment   - Assess and document dressing, incision, wound bed, drain sites and surrounding tissue  - Consider nutrition services referral as needed  - Oral mucous membranes remain intact  - Provide patient/ family education  Outcome: Progressing  Goal: Oral mucous membranes remain intact  Description  INTERVENTIONS  - Assess oral mucosa and hygiene practices  - Implement preventative oral hygiene regimen  - Implement oral medicated treatments as ordered  - Initiate Nutrition services referral as needed  Outcome: Progressing     Problem: HEMATOLOGIC - ADULT  Goal: Maintains hematologic stability  Description  INTERVENTIONS  - Assess for signs and symptoms of bleeding or hemorrhage  - Monitor labs  - Administer supportive blood products/factors as ordered and appropriate  Outcome: Progressing     Problem: MUSCULOSKELETAL - ADULT  Goal: Maintain or return mobility to safest level of function  Description  INTERVENTIONS:  - Assess patient's ability to carry out ADLs; assess patient's baseline for ADL function and identify physical deficits which impact ability to perform ADLs (bathing, care of mouth/teeth, toileting, grooming, dressing, etc )  - Assess/evaluate cause of self-care deficits   - Assess range of motion  - Assess patient's mobility  - Assess patient's need for assistive devices and provide as appropriate  - Encourage maximum independence but intervene and supervise when necessary  - Involve family in performance of ADLs  - Assess for home care needs following discharge - Consider OT consult to assist with ADL evaluation and planning for discharge  - Provide patient education as appropriate  Outcome: Progressing  Goal: Maintain proper alignment of affected body part  Description  INTERVENTIONS:  - Support, maintain and protect limb and body alignment  - Provide patient/ family with appropriate education  Outcome: Progressing

## 2019-11-01 NOTE — ANESTHESIA POSTPROCEDURE EVALUATION
Post-Op Assessment Note    CV Status:  Stable  Pain Score: 0    Pain management: adequate     Mental Status:  Alert and awake   Hydration Status:  Euvolemic   PONV Controlled:  Controlled   Airway Patency:  Patent   Post Op Vitals Reviewed: Yes      Staff: Anesthesiologist, CRNA           /71 (11/01/19 1730)    Temp 98 2 °F (36 8 °C) (11/01/19 1730)    Pulse 90 (11/01/19 1730)   Resp 12 (11/01/19 1730)    SpO2 97 % (11/01/19 1730)

## 2019-11-01 NOTE — PROGRESS NOTES
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, subsequent encounter  Assessment & Plan  With erosion and penile implant palpable through his sacral decubitus ulcer  Heparin was stopped at midnight  NPO since midnight  Proceed to the operating room today for explant of penile and scrotal portion of penile implant with attempt for reservoir removal with Dr Wanda Zamarripa this afternoon  Bedside rounds performed with RN  Discussed with Dr Jose R Fields  Subjective/Objective     Subjective:   CC: "I'm ready to get this implant out"  HPI:  Gina Crandall feels well today is red and had this implant removed  Suprapubic catheter has been draining well  ROS:  Review of Systems   Constitutional: Negative for activity change and appetite change  HENT: Negative for congestion and ear pain  Eyes: Negative for pain  Respiratory: Negative for cough and shortness of breath  Cardiovascular: Negative for chest pain and palpitations  Gastrointestinal: Negative for abdominal distention, abdominal pain, blood in stool, constipation, diarrhea and nausea  Genitourinary: Negative for difficulty urinating and dysuria  Musculoskeletal: Negative for arthralgias and myalgias  Skin: Negative for rash  Allergic/Immunologic: Negative for immunocompromised state  Neurological: Negative for dizziness and headaches  Hematological: Negative for adenopathy  Does not bruise/bleed easily  Psychiatric/Behavioral: Negative for agitation  The patient is not nervous/anxious  Objective:  Vitals: Blood pressure 131/80, pulse 71, temperature 98 3 °F (36 8 °C), resp  rate 16, height 5' 7" (1 702 m), weight 66 7 kg (147 lb 0 8 oz), SpO2 96 %  ,Body mass index is 23 03 kg/m²      Intake/Output Summary (Last 24 hours) at 11/1/2019 1130  Last data filed at 11/1/2019 0500  Gross per 24 hour   Intake 960 ml   Output 1350 ml   Net -390 ml     Invasive Devices     Peripheral Intravenous Line            Peripheral IV 10/30/19 Left Forearm 1 day          Drain            Colostomy Descending/sigmoid LLQ -- days    Suprapubic Catheter Non-latex 16 Fr  42 days                Physical Exam   Constitutional: He is oriented to person, place, and time  He appears well-developed and well-nourished  He is cooperative  He does not appear ill  No distress  41-year-old male with bilateral cataracts no acute distress, resting comfortably in bed  HENT:   Head: Normocephalic and atraumatic  Moist mucous membranes  Eyes: Conjunctivae and EOM are normal    Neck: Normal range of motion  Neck supple  No tracheal deviation present  Cardiovascular: Normal rate, regular rhythm and normal heart sounds  No murmur heard  Pulmonary/Chest: Effort normal and breath sounds normal  No respiratory distress  He has no wheezes  Good airflow bilaterally on deep inspiration  Abdominal: Soft  Bowel sounds are normal  He exhibits no distension and no mass  There is no tenderness  Genitourinary:   Genitourinary Comments: Suprapubic catheter draining clear yellow urine  Hypospadias  Musculoskeletal: Normal range of motion  He exhibits deformity (Right lower extremity status post hip disarticulation)  He exhibits no edema  Neurological: He is alert and oriented to person, place, and time  Skin: Skin is warm and dry  No rash noted  He is not diaphoretic  No erythema  No pallor  Psychiatric: He has a normal mood and affect  His behavior is normal  Judgment and thought content normal    Nursing note and vitals reviewed        History:    Past Medical History:   Diagnosis Date    Anemia     Blind     r eye    Chronic cystitis     Colostomy in place [facility])     Detrusor sphincter dyssynergia     [facility]     Poorly controlled type 2; Last Assessed:  3/18/14    Erectile dysfunction     Frequency of urination     GERD (gastroesophageal reflux disease)     History of diabetes mellitus     History of osteomyelitis     Hx of leg amputation (HCC)     r high upper leg    Hyperlipidemia     Hypertension     Hypospadias     Incomplete bladder emptying     Neurogenic bladder     Paralysis (HCC)     Paraplegia (HCC)     Spinal cord cysts     Ulcer of sacral region [facility])     Urge incontinence      Past Surgical History:   Procedure Laterality Date    AMPUTATION      At hip; Last Assessed:  1/19/16    BLADDER SURGERY      COLON SURGERY      llq ostomy pouch    COLOSTOMY      COMPLEX CYSTOMETROGRAM  2014    CT CYSTOGRAM  9/19/2019    CYSTOSCOPY  2014    IR PICC REPO  9/23/2019    IR SUPRAPUBIC TUBE  9/19/2019    LEG AMPUTATION      MEATOTOMY      PENILE PROSTHESIS IMPLANT  2011    NH ADJ TISS XFER SCALP,EXTREM 10 1-30 SQCM Left 5/1/2017    Procedure: POSTERIOR THIGH V-Y ADMANCEMENT;  Surgeon: Jon Smart MD;  [facility];  Service: Plastics    NH MUSCLE-SKIN FLAP,TRUNK Left 5/1/2017    Procedure: FLAP CLOSURE LEFT ISCHIAL WOUND and "RIGHT" ISCHIAL FLAP ADVANCEMENT * DEBRIDEMENT, [place] ;  Surgeon: Jon Smart MD;  [facility];  Service: Plastics    NH MUSCLE-SKIN FLAP,TRUNK Left 9/27/2017    Procedure: gluteal myocutaneous rotational flap, posterior thigh v to y advancement- wound 5 x 2 5 x 8;  Surgeon: Jon Smart MD;  [facility];  Service: Plastics    SPINE SURGERY      Lower back    UROFLOWMETRY SIMPLE / COMPLEX  2014     Family History   Problem Relation Age of Onset    No Known Problems Mother     No Known Problems Father      Social History     Socioeconomic History    Marital status: /Civil Union     Spouse name: None    Number of children: None    Years of education: None    Highest education level: None   Occupational History    None   Social Needs    Financial resource strain: None    Food insecurity: Worry: None     Inability: None    Transportation needs:     Medical: None     Non-medical: None   Tobacco Use    Smoking status: Former Smoker     Packs/day: 0 50     Years: 10 00     Pack years: 5 00     Last attempt to quit:      Years since quittin 8    Smokeless tobacco: Never Used    Tobacco comment: Onset date:  11/10/17   Substance and Sexual Activity    Alcohol use: Never     Frequency: Never     Comment: Per [employer]:  Social drinker (Onset date:  11/10/17)    Drug use: No    Sexual activity: None   Lifestyle    Physical activity:     Days per week: None     Minutes per session: None    Stress: None   Relationships    Social connections:     Talks on phone: None     Gets together: None     Attends Cheondoism service: None     Active member of club or organization: None     Attends meetings of clubs or organizations: None     Relationship status: None    Intimate partner violence:     Fear of current or ex partner: None     Emotionally abused: None     Physically abused: None     Forced sexual activity: None   Other Topics Concern    None   Social History Narrative    Native language [place]    Buddhism    Social history reviewed, unchanged       Labs:  Results from last 7 days   Lab Units 19  0504 10/30/19  0523 10/29/19  0537   WBC Thousand/uL 7 07 6 86 6 89   HEMOGLOBIN g/dL 8 3* 8 4* 8 1*   PLATELETS Thousands/uL 387 360 334     Results from last 7 days   Lab Units 19  0504 10/31/19  0544 10/30/19  0523 10/29/19  0537 10/28/19  0451   SODIUM mmol/L 137 136 135* 136 132*   POTASSIUM mmol/L 3 2* 3 7 2 9* 3 2* 3 5   CHLORIDE mmol/L 103 102 103 104 98*   CO2 mmol/L 29 25 24 20* 24   BUN mg/dL 7 7 8 14 23   CREATININE mg/dL 0 40* 0 45* 0 59* 0 57* 0 86   EGFR ml/min/1 73sq m 131 125 112 113 96   CALCIUM mg/dL 8 5 8 5 8 5 8 4 9 0   AST U/L  --   --   --  25 54*  51*   ALT U/L  --   --   --  7* 13  14   ALK PHOS U/L  --   --   --  63 80  86     Results from last 7 days   Lab Units 10/28/19  0608 10/28/19  0406 10/28/19  0400   BLOOD CULTURE   --  No Growth After 4 Days  No Growth After 4 Days     URINE CULTURE  >100,000 cfu/ml Escherichia coli ESBL*  10,000-19,000 cfu/ml Pseudomonas aeruginosa*  <10,000 cfu/ml Enterococcus species*  --   --            Geovani Tang PA-C  Date: 11/1/2019 Time: 11:30 AM

## 2019-11-01 NOTE — ASSESSMENT & PLAN NOTE
· Sepsis most likely secondary to UTI, infected penile prosthesis  · Continue with broad-spectrum and antibiotics as per ID, continue gentle hydration  · Fever free more than 24 hours now  · Follow-up urine/blood culture  · Status post penile prosthesis removal today

## 2019-11-01 NOTE — ASSESSMENT & PLAN NOTE
· Hemoglobin stable and looks at baseline      Lab Results   Component Value Date    HGB 8 3 (L) 11/01/2019    HGB 8 4 (L) 10/30/2019    HGB 8 1 (L) 10/29/2019

## 2019-11-01 NOTE — INTERIM OP NOTE
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, subsequent encounter [T83 61XD]  Malfunction of penile prosthesis, subsequent encounter [T83 490D]    Post-Op Diagnosis Codes:     * UTI (urinary tract infection) [N39 0]     * 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, subsequent encounter [T83 61XD]     * Malfunction of penile prosthesis, subsequent encounter [T83 490D]    Procedure(s) (LRB):  REMOVAL PROSTHESIS PENILE (N/A)    Surgeon(s) and Role:     * Marivel Rosa MD - Primary     * Kiel San PA-C - Assisting     * Jamee Huber MD - Assisting    Specimens:  ID Type Source Tests Collected by Time Destination   A : wound culture Tissue Wound ANAEROBIC CULTURE AND GRAM STAIN, CULTURE, TISSUE AND GRAM STAIN Marivel Rosa MD 2019 [id number]        Estimated Blood Loss:   Minimal    Anesthesia Type:   Choice     Findings:    Solitary left penile implant cylinder and rear tip extender eroded into sacral decubitus ulcer  No sign of gross intrascrotal or suprapubic infection    Complications:   None    SIGNATURE: Marivel Rosa MD   DATE: 2019   TIME: 5:01 PM

## 2019-11-01 NOTE — PROGRESS NOTES
Progress Note - Harlan Machuca Moscat 1960, 63 y o  male MRN: [id number]    Unit/Bed#: [facility] [id number] Encounter: [id number]    Primary Care Provider: Edith Valenzuela MD   Date and time admitted to hospital: 10/28/2019  3:48 AM        Sepsis [facility])  Assessment & Plan  · Sepsis most likely secondary to UTI, infected penile prosthesis  · Continue with broad-spectrum and antibiotics as per ID, continue gentle hydration  · Fever free more than 24 hours now  · Follow-up urine/blood culture  · Status post penile prosthesis removal today    * SBO (small bowel obstruction) ([facility] [address] )  Assessment & Plan  · SOB improved now  · Continue to monitor    Diabetes mellitus type II, controlled [facility])  Assessment & Plan  Lab Results   Component Value Date    HGBA1C 5 2 05/06/2019     Recent Labs     11/01/19  0519 11/01/19  1215 11/01/19  1719   POCGLU 87 92 73     Blood Sugar Average: Last 72 hrs:  (P) 84   · Chest blood glucose 1 times daily    Decubitus ulcer of ischium, left, stage IV (HCC)  Assessment & Plan  · Chronic left decubitus ulcer recent with chronic osteomyelitis  · No evidence of acute infection  · Follow with wound care management plan and recommendation    Anemia  Assessment & Plan  · Hemoglobin stable and looks at baseline      Lab Results   Component Value Date    HGB 8 3 (L) 11/01/2019    HGB 8 4 (L) 10/30/2019    HGB 8 1 (L) 10/29/2019           VTE Pharmacologic Prophylaxis:   Pharmacologic: Heparin    Patient Centered Rounds: I have performed bedside rounds with nursing staff today    Discussions with Specialists or Other Care Team Provider:     Education and Discussions with Family / Patient:     Current Length of Stay: 4 day(s)    Current Patient Status: Inpatient   Certification Statement: The patient will continue to require additional inpatient hospital stay due to IV antibiotics for sepsis    Discharge Plan:  11/05/2019    Code Status: Level 1 - Full Code      Subjective:   No complaint    Objective: Vitals:   Temp (24hrs), Av 4 °F (36 9 °C), Min:97 8 °F (36 6 °C), Max:99 5 °F (37 5 °C)    Temp:  [97 8 °F (36 6 °C)-99 5 °F (37 5 °C)] 97 8 °F (36 6 °C)  HR:  [63-98] 63  Resp:  [12-21] 12  BP: (102-131)/(70-80) 111/72  SpO2:  [91 %-99 %] 99 %  Body mass index is 23 03 kg/m²  Input and Output Summary (last 24 hours): Intake/Output Summary (Last 24 hours) at 2019 1759  Last data filed at 2019 1719  Gross per 24 hour   Intake 960 ml   Output 3235 ml   Net -2275 ml       Physical Exam:     General appearance: alert  Head: Normocephalic, without obvious abnormality, atraumatic  Lungs: clear to auscultation bilaterally  Heart: regular rate and rhythm  Abdomen: soft, non-tender, positive bowel sounds   Back: negative  Extremities: extremities atraumatic, no cyanosis or edema  Neurologic: Alert and oriented X 3, normal strength and tone  Normal symmetric reflexes  Normal coordination and gait    Additional Data:     Labs:    Results from last 7 days   Lab Units 19  0504   WBC Thousand/uL 7 07   HEMOGLOBIN g/dL 8 3*   HEMATOCRIT % 28 5*   PLATELETS Thousands/uL 387   NEUTROS PCT % 66   LYMPHS PCT % 19   MONOS PCT % 9   EOS PCT % 5     Results from last 7 days   Lab Units 19  0504  10/29/19  0537   SODIUM mmol/L 137   < > 136   POTASSIUM mmol/L 3 2*   < > 3 2*   CHLORIDE mmol/L 103   < > 104   CO2 mmol/L 29   < > 20*   BUN mg/dL 7   < > 14   CREATININE mg/dL 0 40*   < > 0 57*   ANION GAP mmol/L 5   < > 12   CALCIUM mg/dL 8 5   < > 8 4   ALBUMIN g/dL  --   --  1 6*   TOTAL BILIRUBIN mg/dL  --   --  0 71   ALK PHOS U/L  --   --  63   ALT U/L  --   --  7*   AST U/L  --   --  25   GLUCOSE RANDOM mg/dL 86   < > 64*    < > = values in this interval not displayed           Results from last 7 days   Lab Units 19  1719 19  1215 19  0519 10/28/19  1350   POC GLUCOSE mg/dl 73 92 87 82         Results from last 7 days   Lab Units 10/28/19  0400   LACTIC ACID mmol/L 0 9 * I Have Reviewed All Lab Data Listed Above  * Additional Pertinent Lab Tests Reviewed: [address] 67 Admission Reviewed    Imaging:    Imaging Reports Reviewed Today Include: images reviewed    Recent Cultures (last 7 days):     Results from last 7 days   Lab Units 10/28/19  0608 10/28/19  0406 10/28/19  0400   BLOOD CULTURE   --  No Growth After 4 Days  No Growth After 4 Days  URINE CULTURE  >100,000 cfu/ml Escherichia coli ESBL*  10,000-19,000 cfu/ml Pseudomonas aeruginosa*  <10,000 cfu/ml Enterococcus species*  --   --        Last 24 Hours Medication List:     Current Facility-Administered Medications:  [MAR Hold] acetaminophen 650 mg Oral Q6H PRN Maricarmen Delcid PA-C    [facility] Hold] ertapenem 1,000 mg Intravenous Q24H KALPANA Paniagua Last Rate: 1,000 mg (11/01/19 1200)   [MAR Hold] HYDROmorphone 0 5 mg Intravenous Q4H PRN Sue Nelson MD    lactated ringers 75 mL/hr Intravenous Continuous Stefan Rosa MD Last Rate: 75 mL/hr (11/01/19 1728)   [MAR Hold] morphine injection 2 mg Intravenous Q3H PRN Brenton Cristobal PA-C    [facility] Hold] ondansetron 4 mg Intravenous Q6H PRN Maricarmen Delcid PA-C    [facility] Hold] oxyCODONE 20 mg Oral TID Ifeanyi Jerome PA-C    [MAR Hold] pantoprazole 20 mg Oral Early Morning Sue Nelson MD    [facility] Hold] vancomycin 125 mg Oral Q12H KALPANA Mathew         Today, Patient Was Seen By: Stefan Rosa MD    ** Please Note: Dictation voice to text software may have been used in the creation of this document   **

## 2019-11-02 LAB
ANION GAP SERPL CALCULATED.3IONS-SCNC: 2 MMOL/L (ref 4–13)
ANION GAP SERPL CALCULATED.3IONS-SCNC: 6 MMOL/L (ref 4–13)
BACTERIA BLD CULT: NORMAL
BACTERIA BLD CULT: NORMAL
BASOPHILS # BLD AUTO: 0.02 THOUSANDS/ΜL (ref 0–0.1)
BASOPHILS NFR BLD AUTO: 0 % (ref 0–1)
BUN SERPL-MCNC: 4 MG/DL (ref 5–25)
BUN SERPL-MCNC: 6 MG/DL (ref 5–25)
CALCIUM SERPL-MCNC: 8.3 MG/DL (ref 8.3–10.1)
CALCIUM SERPL-MCNC: 8.4 MG/DL (ref 8.3–10.1)
CHLORIDE SERPL-SCNC: 104 MMOL/L (ref 100–108)
CHLORIDE SERPL-SCNC: 87 MMOL/L (ref 100–108)
CO2 SERPL-SCNC: 28 MMOL/L (ref 21–32)
CO2 SERPL-SCNC: 29 MMOL/L (ref 21–32)
CREAT SERPL-MCNC: 0.56 MG/DL (ref 0.6–1.3)
CREAT SERPL-MCNC: 0.56 MG/DL (ref 0.6–1.3)
EOSINOPHIL # BLD AUTO: 0.22 THOUSAND/ΜL (ref 0–0.61)
EOSINOPHIL NFR BLD AUTO: 3 % (ref 0–6)
ERYTHROCYTE [DISTWIDTH] IN BLOOD BY AUTOMATED COUNT: 18.9 % (ref 11.6–15.1)
GFR SERPL CREATININE-BSD FRML MDRD: 114 ML/MIN/1.73SQ M
GFR SERPL CREATININE-BSD FRML MDRD: 114 ML/MIN/1.73SQ M
GLUCOSE SERPL-MCNC: 100 MG/DL (ref 65–140)
GLUCOSE SERPL-MCNC: 93 MG/DL (ref 65–140)
HCT VFR BLD AUTO: 31.4 % (ref 36.5–49.3)
HGB BLD-MCNC: 9.1 G/DL (ref 12–17)
IMM GRANULOCYTES # BLD AUTO: 0.03 THOUSAND/UL (ref 0–0.2)
IMM GRANULOCYTES NFR BLD AUTO: 0 % (ref 0–2)
LYMPHOCYTES # BLD AUTO: 1.23 THOUSANDS/ΜL (ref 0.6–4.47)
LYMPHOCYTES NFR BLD AUTO: 17 % (ref 14–44)
MCH RBC QN AUTO: 23.5 PG (ref 26.8–34.3)
MCHC RBC AUTO-ENTMCNC: 29 G/DL (ref 31.4–37.4)
MCV RBC AUTO: 81 FL (ref 82–98)
MONOCYTES # BLD AUTO: 0.48 THOUSAND/ΜL (ref 0.17–1.22)
MONOCYTES NFR BLD AUTO: 7 % (ref 4–12)
NEUTROPHILS # BLD AUTO: 5.11 THOUSANDS/ΜL (ref 1.85–7.62)
NEUTS SEG NFR BLD AUTO: 73 % (ref 43–75)
NRBC BLD AUTO-RTO: 0 /100 WBCS
PLATELET # BLD AUTO: 456 THOUSANDS/UL (ref 149–390)
PMV BLD AUTO: 10.1 FL (ref 8.9–12.7)
POTASSIUM SERPL-SCNC: 2.8 MMOL/L (ref 3.5–5.3)
POTASSIUM SERPL-SCNC: 3.1 MMOL/L (ref 3.5–5.3)
RBC # BLD AUTO: 3.88 MILLION/UL (ref 3.88–5.62)
SODIUM SERPL-SCNC: 118 MMOL/L (ref 136–145)
SODIUM SERPL-SCNC: 138 MMOL/L (ref 136–145)
WBC # BLD AUTO: 7.09 THOUSAND/UL (ref 4.31–10.16)

## 2019-11-02 PROCEDURE — 85025 COMPLETE CBC W/AUTO DIFF WBC: CPT | Performed by: INTERNAL MEDICINE

## 2019-11-02 PROCEDURE — 80048 BASIC METABOLIC PNL TOTAL CA: CPT | Performed by: INTERNAL MEDICINE

## 2019-11-02 PROCEDURE — 99232 SBSQ HOSP IP/OBS MODERATE 35: CPT | Performed by: INTERNAL MEDICINE

## 2019-11-02 PROCEDURE — 99024 POSTOP FOLLOW-UP VISIT: CPT | Performed by: UROLOGY

## 2019-11-02 RX ORDER — SODIUM CHLORIDE AND POTASSIUM CHLORIDE .9; .15 G/100ML; G/100ML
75 SOLUTION INTRAVENOUS CONTINUOUS
Status: DISCONTINUED | OUTPATIENT
Start: 2019-11-02 | End: 2019-11-03

## 2019-11-02 RX ORDER — POTASSIUM CHLORIDE 20 MEQ/1
40 TABLET, EXTENDED RELEASE ORAL ONCE
Status: COMPLETED | OUTPATIENT
Start: 2019-11-02 | End: 2019-11-02

## 2019-11-02 RX ADMIN — SODIUM CHLORIDE AND POTASSIUM CHLORIDE 75 ML/HR: .9; .15 SOLUTION INTRAVENOUS at 12:24

## 2019-11-02 RX ADMIN — HEPARIN SODIUM 5000 UNITS: 5000 INJECTION INTRAVENOUS; SUBCUTANEOUS at 21:19

## 2019-11-02 RX ADMIN — OXYCODONE HYDROCHLORIDE 20 MG: 10 TABLET ORAL at 21:19

## 2019-11-02 RX ADMIN — OXYCODONE HYDROCHLORIDE 20 MG: 10 TABLET ORAL at 08:35

## 2019-11-02 RX ADMIN — HEPARIN SODIUM 5000 UNITS: 5000 INJECTION INTRAVENOUS; SUBCUTANEOUS at 14:21

## 2019-11-02 RX ADMIN — HYDROMORPHONE HYDROCHLORIDE 0.5 MG: 1 INJECTION, SOLUTION INTRAMUSCULAR; INTRAVENOUS; SUBCUTANEOUS at 00:15

## 2019-11-02 RX ADMIN — POTASSIUM CHLORIDE 40 MEQ: 1500 TABLET, EXTENDED RELEASE ORAL at 12:24

## 2019-11-02 RX ADMIN — SODIUM CHLORIDE, SODIUM LACTATE, POTASSIUM CHLORIDE, AND CALCIUM CHLORIDE 75 ML/HR: .6; .31; .03; .02 INJECTION, SOLUTION INTRAVENOUS at 08:37

## 2019-11-02 RX ADMIN — OXYCODONE HYDROCHLORIDE 20 MG: 10 TABLET ORAL at 16:35

## 2019-11-02 RX ADMIN — VANCOMYCIN HYDROCHLORIDE 125 MG: 500 INJECTION, POWDER, LYOPHILIZED, FOR SOLUTION INTRAVENOUS at 08:35

## 2019-11-02 RX ADMIN — HEPARIN SODIUM 5000 UNITS: 5000 INJECTION INTRAVENOUS; SUBCUTANEOUS at 06:20

## 2019-11-02 RX ADMIN — ERTAPENEM SODIUM 1000 MG: 1 INJECTION, POWDER, LYOPHILIZED, FOR SOLUTION INTRAMUSCULAR; INTRAVENOUS at 11:34

## 2019-11-02 RX ADMIN — PANTOPRAZOLE SODIUM 20 MG: 20 TABLET, DELAYED RELEASE ORAL at 06:20

## 2019-11-02 RX ADMIN — VANCOMYCIN HYDROCHLORIDE 125 MG: 500 INJECTION, POWDER, LYOPHILIZED, FOR SOLUTION INTRAVENOUS at 21:19

## 2019-11-02 NOTE — PROGRESS NOTES
Subjective:  No acute events overnight, patient has no complaints, interviewed this morning in his native language of [place]  States that he feels better today  Objective:  T-max of 101 7 degrees, T current of an 9 4, otherwise his vital signs are stable and reassuring apart from some transient tachycardia  No apparent distress, alert and oriented  Abdomen is soft, appropriately tender, colostomy is functioning  Suprapubic catheter is in place, some expected swelling in the suprapubic region, drain is serosanguineous, output from the drain is 25 milliliters  Assessment:  63year-old gentleman postop day 1  Status post removal of infected penile prosthesis, doing well from a urologic perspective      Plan:  Continue multi-disciplinary management of this patient, follow-up cultures, continue drain at this time, potential drain removal prior to discharge  Urology following

## 2019-11-02 NOTE — PLAN OF CARE
Problem: Potential for Falls  Goal: Patient will remain free of falls  Description  INTERVENTIONS:  - Assess patient frequently for physical needs  -  Identify cognitive and physical deficits and behaviors that affect risk of falls  -  [place] fall precautions as indicated by assessment   - Educate patient/family on patient safety including physical limitations  - Instruct patient to call for assistance with activity based on assessment  - Modify environment to reduce risk of injury  - Consider OT/PT consult to assist with strengthening/mobility  Outcome: Progressing     Problem: Prexisting or High Potential for Compromised Skin Integrity  Goal: Skin integrity is maintained or improved  Description  INTERVENTIONS:  - Identify patients at risk for skin breakdown  - Assess and monitor skin integrity  - Assess and monitor nutrition and hydration status  - Monitor labs   - Assess for incontinence   - Turn and reposition patient  - Assist with mobility/ambulation  - Relieve pressure over bony prominences  - Avoid friction and shearing  - Provide appropriate hygiene as needed including keeping skin clean and dry  - Evaluate need for skin moisturizer/barrier cream  - Collaborate with interdisciplinary team   - Patient/family teaching  - Consider wound care consult   Outcome: Progressing     Problem: Nutrition/Hydration-ADULT  Goal: Nutrient/Hydration intake appropriate for improving, restoring or maintaining nutritional needs  Description  Monitor and assess patient's nutrition/hydration status for malnutrition  Collaborate with interdisciplinary team and initiate plan and interventions as ordered  Monitor patient's weight and dietary intake as ordered or per policy  Utilize nutrition screening tool and intervene as necessary  Determine patient's food preferences and provide high-protein, high-caloric foods as appropriate       INTERVENTIONS:  - Monitor oral intake, urinary output, labs, and treatment plans  - Assess nutrition and hydration status and recommend course of action  - Evaluate amount of meals eaten  - Assist patient with eating if necessary   - Allow adequate time for meals  - Recommend/ encourage appropriate diets, oral nutritional supplements, and vitamin/mineral supplements  - Order, calculate, and assess calorie counts as needed  - Recommend, monitor, and adjust tube feedings and TPN/PPN based on assessed needs  - Assess need for intravenous fluids  - Provide specific nutrition/hydration education as appropriate  - Include patient/family/caregiver in decisions related to nutrition  Outcome: Progressing     Problem: PAIN - ADULT  Goal: Verbalizes/displays adequate comfort level or baseline comfort level  Description  Interventions:  - Encourage patient to monitor pain and request assistance  - Assess pain using appropriate pain scale  - Administer analgesics based on type and severity of pain and evaluate response  - Implement non-pharmacological measures as appropriate and evaluate response  - Consider cultural and social influences on pain and pain management  - Notify physician/advanced practitioner if interventions unsuccessful or patient reports new pain  Outcome: Progressing     Problem: INFECTION - ADULT  Goal: Absence or prevention of progression during hospitalization  Description  INTERVENTIONS:  - Assess and monitor for signs and symptoms of infection  - Monitor lab/diagnostic results  - Monitor all insertion sites, i e  indwelling lines, tubes, and drains  - Monitor endotracheal if appropriate and nasal secretions for changes in amount and color  - [place] appropriate cooling/warming therapies per order  - Administer medications as ordered  - Instruct and encourage patient and family to use good hand hygiene technique  - Identify and instruct in appropriate isolation precautions for identified infection/condition  Outcome: Progressing     Problem: SAFETY ADULT  Goal: Maintain or return to baseline ADL function  Description  INTERVENTIONS:  -  Assess patient's ability to carry out ADLs; assess patient's baseline for ADL function and identify physical deficits which impact ability to perform ADLs (bathing, care of mouth/teeth, toileting, grooming, dressing, etc )  - Assess/evaluate cause of self-care deficits   - Assess range of motion  - Assess patient's mobility; develop plan if impaired  - Assess patient's need for assistive devices and provide as appropriate  - Encourage maximum independence but intervene and supervise when necessary  - Involve family in performance of ADLs  - Assess for home care needs following discharge   - Consider OT consult to assist with ADL evaluation and planning for discharge  - Provide patient education as appropriate  Outcome: Progressing  Goal: Maintain or return mobility status to optimal level  Description  INTERVENTIONS:  - Assess patient's baseline mobility status (ambulation, transfers, stairs, etc )    - Identify cognitive and physical deficits and behaviors that affect mobility  - Identify mobility aids required to assist with transfers and/or ambulation (gait belt, sit-to-stand, lift, walker, cane, etc )  - [place] fall precautions as indicated by assessment  - Record patient progress and toleration of activity level on Mobility SBAR; progress patient to next Phase/Stage  - Instruct patient to call for assistance with activity based on assessment  - Consider rehabilitation consult to assist with strengthening/weightbearing, etc   Outcome: Progressing     Problem: DISCHARGE PLANNING  Goal: Discharge to home or other facility with appropriate resources  Description  INTERVENTIONS:  - Identify barriers to discharge w/patient and caregiver  - Arrange for needed discharge resources and transportation as appropriate  - Identify discharge learning needs (meds, wound care, etc )  - Arrange for interpretive services to assist at discharge as needed  - Refer to Case Management Department for coordinating discharge planning if the patient needs post-hospital services based on physician/advanced practitioner order or complex needs related to functional status, cognitive ability, or social support system  Outcome: Progressing     Problem: Knowledge Deficit  Goal: Patient/family/caregiver demonstrates understanding of disease process, treatment plan, medications, and discharge instructions  Description  Complete learning assessment and assess knowledge base    Interventions:  - Provide teaching at level of understanding  - Provide teaching via preferred learning methods  Outcome: Progressing     Problem: GASTROINTESTINAL - ADULT  Goal: Minimal or absence of nausea and/or vomiting  Description  INTERVENTIONS:  - Administer IV fluids if ordered to ensure adequate hydration  - Maintain NPO status until nausea and vomiting are resolved  - Nasogastric tube if ordered  - Administer ordered antiemetic medications as needed  - Provide nonpharmacologic comfort measures as appropriate  - Advance diet as tolerated, if ordered  - Consider nutrition services referral to assist patient with adequate nutrition and appropriate food choices  Outcome: Progressing  Goal: Maintains or returns to baseline bowel function  Description  INTERVENTIONS:  - Assess bowel function  - Encourage oral fluids to ensure adequate hydration  - Administer IV fluids if ordered to ensure adequate hydration  - Administer ordered medications as needed  - Encourage mobilization and activity  - Consider nutritional services referral to assist patient with adequate nutrition and appropriate food choices  Outcome: Progressing  Goal: Maintains adequate nutritional intake  Description  INTERVENTIONS:  - Monitor percentage of each meal consumed  - Identify factors contributing to decreased intake, treat as appropriate  - Assist with meals as needed  - Monitor I&O, weight, and lab values if indicated  - Obtain nutrition services referral as needed  Outcome: Progressing  Goal: Establish and maintain optimal ostomy function  Description  INTERVENTIONS:  - Assess bowel function  - Encourage oral fluids to ensure adequate hydration  - Administer IV fluids if ordered to ensure adequate hydration   - Administer ordered medications as needed  - Encourage mobilization and activity  - Nutrition services referral to assist patient with appropriate food choices  - Assess stoma site  - Consider wound care consult   Outcome: Progressing     Problem: GENITOURINARY - ADULT  Goal: Maintains or returns to baseline urinary function  Description  INTERVENTIONS:  - Assess urinary function  - Encourage oral fluids to ensure adequate hydration if ordered  - Administer IV fluids as ordered to ensure adequate hydration  - Administer ordered medications as needed  - Offer frequent toileting  - Follow urinary retention protocol if ordered  Outcome: Progressing  Goal: Absence of urinary retention  Description  INTERVENTIONS:  - Assess patients ability to void and empty bladder  - Monitor I/O  - Bladder scan as needed  - Discuss with physician/AP medications to alleviate retention as needed  - Discuss catheterization for long term situations as appropriate  Outcome: Progressing  Goal: Urinary catheter remains patent  Description  INTERVENTIONS:  - Assess patency of urinary catheter  - If patient has a chronic dangelo, consider changing catheter if non-functioning  - Follow guidelines for intermittent irrigation of non-functioning urinary catheter  Outcome: Progressing     Problem: SKIN/TISSUE INTEGRITY - ADULT  Goal: Skin integrity remains intact  Description  INTERVENTIONS  - Identify patients at risk for skin breakdown  - Assess and monitor skin integrity  - Assess and monitor nutrition and hydration status  - Monitor labs (i e  albumin)  - Assess for incontinence   - Turn and reposition patient  - Assist with mobility/ambulation  - Relieve pressure over bony prominences  - Avoid friction and shearing  - Provide appropriate hygiene as needed including keeping skin clean and dry  - Evaluate need for skin moisturizer/barrier cream  - Collaborate with interdisciplinary team (i e  Nutrition, Rehabilitation, etc )   - Patient/family teaching  Outcome: Progressing  Goal: Incision(s), wounds(s) or drain site(s) healing without S/S of infection  Description  INTERVENTIONS  - Assess and document risk factors for skin impairment   - Assess and document dressing, incision, wound bed, drain sites and surrounding tissue  - Consider nutrition services referral as needed  - Oral mucous membranes remain intact  - Provide patient/ family education  Outcome: Progressing  Goal: Oral mucous membranes remain intact  Description  INTERVENTIONS  - Assess oral mucosa and hygiene practices  - Implement preventative oral hygiene regimen  - Implement oral medicated treatments as ordered  - Initiate Nutrition services referral as needed  Outcome: Progressing     Problem: HEMATOLOGIC - ADULT  Goal: Maintains hematologic stability  Description  INTERVENTIONS  - Assess for signs and symptoms of bleeding or hemorrhage  - Monitor labs  - Administer supportive blood products/factors as ordered and appropriate  Outcome: Progressing     Problem: MUSCULOSKELETAL - ADULT  Goal: Maintain or return mobility to safest level of function  Description  INTERVENTIONS:  - Assess patient's ability to carry out ADLs; assess patient's baseline for ADL function and identify physical deficits which impact ability to perform ADLs (bathing, care of mouth/teeth, toileting, grooming, dressing, etc )  - Assess/evaluate cause of self-care deficits   - Assess range of motion  - Assess patient's mobility  - Assess patient's need for assistive devices and provide as appropriate  - Encourage maximum independence but intervene and supervise when necessary  - Involve family in performance of ADLs  - Assess for home care needs following discharge - Consider OT consult to assist with ADL evaluation and planning for discharge  - Provide patient education as appropriate  Outcome: Progressing  Goal: Maintain proper alignment of affected body part  Description  INTERVENTIONS:  - Support, maintain and protect limb and body alignment  - Provide patient/ family with appropriate education  Outcome: Progressing

## 2019-11-02 NOTE — PLAN OF CARE
Problem: Potential for Falls  Goal: Patient will remain free of falls  Description  INTERVENTIONS:  - Assess patient frequently for physical needs  -  Identify cognitive and physical deficits and behaviors that affect risk of falls  -  [place] fall precautions as indicated by assessment   - Educate patient/family on patient safety including physical limitations  - Instruct patient to call for assistance with activity based on assessment  - Modify environment to reduce risk of injury  - Consider OT/PT consult to assist with strengthening/mobility  Outcome: Progressing     Problem: Prexisting or High Potential for Compromised Skin Integrity  Goal: Skin integrity is maintained or improved  Description  INTERVENTIONS:  - Identify patients at risk for skin breakdown  - Assess and monitor skin integrity  - Assess and monitor nutrition and hydration status  - Monitor labs   - Assess for incontinence   - Turn and reposition patient  - Assist with mobility/ambulation  - Relieve pressure over bony prominences  - Avoid friction and shearing  - Provide appropriate hygiene as needed including keeping skin clean and dry  - Evaluate need for skin moisturizer/barrier cream  - Collaborate with interdisciplinary team   - Patient/family teaching  - Consider wound care consult   Outcome: Progressing     Problem: Nutrition/Hydration-ADULT  Goal: Nutrient/Hydration intake appropriate for improving, restoring or maintaining nutritional needs  Description  Monitor and assess patient's nutrition/hydration status for malnutrition  Collaborate with interdisciplinary team and initiate plan and interventions as ordered  Monitor patient's weight and dietary intake as ordered or per policy  Utilize nutrition screening tool and intervene as necessary  Determine patient's food preferences and provide high-protein, high-caloric foods as appropriate       INTERVENTIONS:  - Monitor oral intake, urinary output, labs, and treatment plans  - Assess nutrition and hydration status and recommend course of action  - Evaluate amount of meals eaten  - Assist patient with eating if necessary   - Allow adequate time for meals  - Recommend/ encourage appropriate diets, oral nutritional supplements, and vitamin/mineral supplements  - Order, calculate, and assess calorie counts as needed  - Recommend, monitor, and adjust tube feedings and TPN/PPN based on assessed needs  - Assess need for intravenous fluids  - Provide specific nutrition/hydration education as appropriate  - Include patient/family/caregiver in decisions related to nutrition  Outcome: Progressing     Problem: Nutrition/Hydration-ADULT  Goal: Nutrient/Hydration intake appropriate for improving, restoring or maintaining nutritional needs  Description  Monitor and assess patient's nutrition/hydration status for malnutrition  Collaborate with interdisciplinary team and initiate plan and interventions as ordered  Monitor patient's weight and dietary intake as ordered or per policy  Utilize nutrition screening tool and intervene as necessary  Determine patient's food preferences and provide high-protein, high-caloric foods as appropriate       INTERVENTIONS:  - Monitor oral intake, urinary output, labs, and treatment plans  - Assess nutrition and hydration status and recommend course of action  - Evaluate amount of meals eaten  - Assist patient with eating if necessary   - Allow adequate time for meals  - Recommend/ encourage appropriate diets, oral nutritional supplements, and vitamin/mineral supplements  - Order, calculate, and assess calorie counts as needed  - Recommend, monitor, and adjust tube feedings and TPN/PPN based on assessed needs  - Assess need for intravenous fluids  - Provide specific nutrition/hydration education as appropriate  - Include patient/family/caregiver in decisions related to nutrition  Outcome: Progressing     Problem: PAIN - ADULT  Goal: Verbalizes/displays adequate comfort level or baseline comfort level  Description  Interventions:  - Encourage patient to monitor pain and request assistance  - Assess pain using appropriate pain scale  - Administer analgesics based on type and severity of pain and evaluate response  - Implement non-pharmacological measures as appropriate and evaluate response  - Consider cultural and social influences on pain and pain management  - Notify physician/advanced practitioner if interventions unsuccessful or patient reports new pain  Outcome: Progressing     Problem: INFECTION - ADULT  Goal: Absence or prevention of progression during hospitalization  Description  INTERVENTIONS:  - Assess and monitor for signs and symptoms of infection  - Monitor lab/diagnostic results  - Monitor all insertion sites, i e  indwelling lines, tubes, and drains  - Monitor endotracheal if appropriate and nasal secretions for changes in amount and color  - [place] appropriate cooling/warming therapies per order  - Administer medications as ordered  - Instruct and encourage patient and family to use good hand hygiene technique  - Identify and instruct in appropriate isolation precautions for identified infection/condition  Outcome: Progressing     Problem: SAFETY ADULT  Goal: Maintain or return to baseline ADL function  Description  INTERVENTIONS:  -  Assess patient's ability to carry out ADLs; assess patient's baseline for ADL function and identify physical deficits which impact ability to perform ADLs (bathing, care of mouth/teeth, toileting, grooming, dressing, etc )  - Assess/evaluate cause of self-care deficits   - Assess range of motion  - Assess patient's mobility; develop plan if impaired  - Assess patient's need for assistive devices and provide as appropriate  - Encourage maximum independence but intervene and supervise when necessary  - Involve family in performance of ADLs  - Assess for home care needs following discharge   - Consider OT consult to assist with ADL evaluation and planning for discharge  - Provide patient education as appropriate  Outcome: Progressing  Goal: Maintain or return mobility status to optimal level  Description  INTERVENTIONS:  - Assess patient's baseline mobility status (ambulation, transfers, stairs, etc )    - Identify cognitive and physical deficits and behaviors that affect mobility  - Identify mobility aids required to assist with transfers and/or ambulation (gait belt, sit-to-stand, lift, walker, cane, etc )  - [place] fall precautions as indicated by assessment  - Record patient progress and toleration of activity level on Mobility SBAR; progress patient to next Phase/Stage  - Instruct patient to call for assistance with activity based on assessment  - Consider rehabilitation consult to assist with strengthening/weightbearing, etc   Outcome: Progressing     Problem: DISCHARGE PLANNING  Goal: Discharge to home or other facility with appropriate resources  Description  INTERVENTIONS:  - Identify barriers to discharge w/patient and caregiver  - Arrange for needed discharge resources and transportation as appropriate  - Identify discharge learning needs (meds, wound care, etc )  - Arrange for interpretive services to assist at discharge as needed  - Refer to Case Management Department for coordinating discharge planning if the patient needs post-hospital services based on physician/advanced practitioner order or complex needs related to functional status, cognitive ability, or social support system  Outcome: Progressing     Problem: Knowledge Deficit  Goal: Patient/family/caregiver demonstrates understanding of disease process, treatment plan, medications, and discharge instructions  Description  Complete learning assessment and assess knowledge base    Interventions:  - Provide teaching at level of understanding  - Provide teaching via preferred learning methods  Outcome: Progressing     Problem: GASTROINTESTINAL - ADULT  Goal: Minimal or absence of nausea and/or vomiting  Description  INTERVENTIONS:  - Administer IV fluids if ordered to ensure adequate hydration  - Maintain NPO status until nausea and vomiting are resolved  - Nasogastric tube if ordered  - Administer ordered antiemetic medications as needed  - Provide nonpharmacologic comfort measures as appropriate  - Advance diet as tolerated, if ordered  - Consider nutrition services referral to assist patient with adequate nutrition and appropriate food choices  Outcome: Progressing  Goal: Maintains or returns to baseline bowel function  Description  INTERVENTIONS:  - Assess bowel function  - Encourage oral fluids to ensure adequate hydration  - Administer IV fluids if ordered to ensure adequate hydration  - Administer ordered medications as needed  - Encourage mobilization and activity  - Consider nutritional services referral to assist patient with adequate nutrition and appropriate food choices  Outcome: Progressing  Goal: Maintains adequate nutritional intake  Description  INTERVENTIONS:  - Monitor percentage of each meal consumed  - Identify factors contributing to decreased intake, treat as appropriate  - Assist with meals as needed  - Monitor I&O, weight, and lab values if indicated  - Obtain nutrition services referral as needed  Outcome: Progressing  Goal: Establish and maintain optimal ostomy function  Description  INTERVENTIONS:  - Assess bowel function  - Encourage oral fluids to ensure adequate hydration  - Administer IV fluids if ordered to ensure adequate hydration   - Administer ordered medications as needed  - Encourage mobilization and activity  - Nutrition services referral to assist patient with appropriate food choices  - Assess stoma site  - Consider wound care consult   Outcome: Progressing     Problem: GENITOURINARY - ADULT  Goal: Maintains or returns to baseline urinary function  Description  INTERVENTIONS:  - Assess urinary function  - Encourage oral fluids to ensure adequate hydration if ordered  - Administer IV fluids as ordered to ensure adequate hydration  - Administer ordered medications as needed  - Offer frequent toileting  - Follow urinary retention protocol if ordered  Outcome: Progressing  Goal: Absence of urinary retention  Description  INTERVENTIONS:  - Assess patients ability to void and empty bladder  - Monitor I/O  - Bladder scan as needed  - Discuss with physician/AP medications to alleviate retention as needed  - Discuss catheterization for long term situations as appropriate  Outcome: Progressing  Goal: Urinary catheter remains patent  Description  INTERVENTIONS:  - Assess patency of urinary catheter  - If patient has a chronic dangelo, consider changing catheter if non-functioning  - Follow guidelines for intermittent irrigation of non-functioning urinary catheter  Outcome: Progressing     Problem: SKIN/TISSUE INTEGRITY - ADULT  Goal: Skin integrity remains intact  Description  INTERVENTIONS  - Identify patients at risk for skin breakdown  - Assess and monitor skin integrity  - Assess and monitor nutrition and hydration status  - Monitor labs (i e  albumin)  - Assess for incontinence   - Turn and reposition patient  - Assist with mobility/ambulation  - Relieve pressure over bony prominences  - Avoid friction and shearing  - Provide appropriate hygiene as needed including keeping skin clean and dry  - Evaluate need for skin moisturizer/barrier cream  - Collaborate with interdisciplinary team (i e  Nutrition, Rehabilitation, etc )   - Patient/family teaching  Outcome: Progressing  Goal: Incision(s), wounds(s) or drain site(s) healing without S/S of infection  Description  INTERVENTIONS  - Assess and document risk factors for skin impairment   - Assess and document dressing, incision, wound bed, drain sites and surrounding tissue  - Consider nutrition services referral as needed  - Oral mucous membranes remain intact  - Provide patient/ family education  Outcome: Progressing  Goal: Oral mucous membranes remain intact  Description  INTERVENTIONS  - Assess oral mucosa and hygiene practices  - Implement preventative oral hygiene regimen  - Implement oral medicated treatments as ordered  - Initiate Nutrition services referral as needed  Outcome: Progressing     Problem: MUSCULOSKELETAL - ADULT  Goal: Maintain or return mobility to safest level of function  Description  INTERVENTIONS:  - Assess patient's ability to carry out ADLs; assess patient's baseline for ADL function and identify physical deficits which impact ability to perform ADLs (bathing, care of mouth/teeth, toileting, grooming, dressing, etc )  - Assess/evaluate cause of self-care deficits   - Assess range of motion  - Assess patient's mobility  - Assess patient's need for assistive devices and provide as appropriate  - Encourage maximum independence but intervene and supervise when necessary  - Involve family in performance of ADLs  - Assess for home care needs following discharge   - Consider OT consult to assist with ADL evaluation and planning for discharge  - Provide patient education as appropriate  Outcome: Progressing  Goal: Maintain proper alignment of affected body part  Description  INTERVENTIONS:  - Support, maintain and protect limb and body alignment  - Provide patient/ family with appropriate education  Outcome: Progressing     Problem: HEMATOLOGIC - ADULT  Goal: Maintains hematologic stability  Description  INTERVENTIONS  - Assess for signs and symptoms of bleeding or hemorrhage  - Monitor labs  - Administer supportive blood products/factors as ordered and appropriate  Outcome: Progressing

## 2019-11-02 NOTE — PROGRESS NOTES
Progress Note - Cathleen Welsh [place]oscat 1960, 63 y o  male MRN: [id number]    Unit/Bed#: [facility] 2 -01 Encounter: [id number]    Primary Care Provider: Lor Beltran MD   Date and time admitted to hospital: 10/28/2019  3:48 AM        Sepsis [facility])  Assessment & Plan  · Sepsis most likely secondary to UTI,  infected penile prosthesis  · Sepsis now improving  · Continue with broad-spectrum and antibiotics as per ID, continue gentle hydration  · Two episodes of postop fever, continue same management and monitor vital signs  · Follow-up urine/blood culture  · Status post penile prosthesis removal    * SBO (small bowel obstruction) ([address] )  Assessment & Plan  · SOB improved now  · Continue to monitor    Diabetes mellitus type II, controlled [facility])  Assessment & Plan  Lab Results   Component Value Date    HGBA1C 5 2 05/06/2019     Recent Labs     11/01/19  0519 11/01/19  1215 11/01/19  1719   POCGLU 87 92 73     Blood Sugar Average: Last 72 hrs:  (P) 84   · Chest blood glucose 1 times daily    Hypokalemia  Assessment & Plan  · Potassium supplemented    Decubitus ulcer of ischium, left, stage IV (HCC)  Assessment & Plan  · Chronic left decubitus ulcer recent with chronic osteomyelitis  · No evidence of acute infection  · Follow with wound care management plan and recommendation    Anemia  Assessment & Plan  · Hemoglobin stable and looks at baseline      Lab Results   Component Value Date    HGB 9 1 (L) 11/02/2019    HGB 8 3 (L) 11/01/2019    HGB 8 4 (L) 10/30/2019         VTE Pharmacologic Prophylaxis:   Pharmacologic: Heparin    Patient Centered Rounds: I have performed bedside rounds with nursing staff today    Discussions with Specialists or Other Care Team Provider:     Education and Discussions with Family / Patient:     Current Length of Stay: 5 day(s)    Current Patient Status: Inpatient   Certification Statement: The patient will continue to require additional inpatient hospital stay due to Sepsis  for IV antibiotics    Discharge Plan:  On 2019    Code Status: Level 1 - Full Code      Subjective:   No complaints  No events overnight  Objective:     Vitals:   Temp (24hrs), Av 2 °F (37 3 °C), Min:97 8 °F (36 6 °C), Max:101 7 °F (38 7 °C)    Temp:  [97 8 °F (36 6 °C)-101 7 °F (38 7 °C)] 98 1 °F (36 7 °C)  HR:  [] 95  Resp:  [12-21] 18  BP: (102-113)/(67-77) 109/77  SpO2:  [93 %-99 %] 96 %  Body mass index is 23 03 kg/m²  Input and Output Summary (last 24 hours): Intake/Output Summary (Last 24 hours) at 2019 1531  Last data filed at 2019 1021  Gross per 24 hour   Intake 1917 5 ml   Output 1525 ml   Net 392 5 ml       Physical Exam:     General appearance: alert  Head: Normocephalic, without obvious abnormality, atraumatic  Lungs: clear to auscultation bilaterally  Heart: regular rate and rhythm  Abdomen: soft, non-tender, positive bowel sounds   Back: negative  Extremities: No extremity edema  Neurologic: Grossly normal    Additional Data:     Labs:    Results from last 7 days   Lab Units 19  0405   WBC Thousand/uL 7 09   HEMOGLOBIN g/dL 9 1*   HEMATOCRIT % 31 4*   PLATELETS Thousands/uL 456*   NEUTROS PCT % 73   LYMPHS PCT % 17   MONOS PCT % 7   EOS PCT % 3     Results from last 7 days   Lab Units 19  0954  10/29/19  0537   SODIUM mmol/L 138   < > 136   POTASSIUM mmol/L 3 1*   < > 3 2*   CHLORIDE mmol/L 104   < > 104   CO2 mmol/L 28   < > 20*   BUN mg/dL 6   < > 14   CREATININE mg/dL 0 56*   < > 0 57*   ANION GAP mmol/L 6   < > 12   CALCIUM mg/dL 8 4   < > 8 4   ALBUMIN g/dL  --   --  1 6*   TOTAL BILIRUBIN mg/dL  --   --  0 71   ALK PHOS U/L  --   --  63   ALT U/L  --   --  7*   AST U/L  --   --  25   GLUCOSE RANDOM mg/dL 100   < > 64*    < > = values in this interval not displayed           Results from last 7 days   Lab Units 19  1719 19  1215 19  0519 10/28/19  1350   POC GLUCOSE mg/dl 73 92 87 82         Results from last 7 days   Lab Units 10/28/19  0400   LACTIC ACID mmol/L 0 9           * I Have Reviewed All Lab Data Listed Above  * Additional Pertinent Lab Tests Reviewed: [address] Admission Reviewed    Imaging:    Imaging Reports Reviewed Today Include: images reviewed    Recent Cultures (last 7 days):     Results from last 7 days   Lab Units 11/01/19  1651 10/28/19  0608 10/28/19  0406 10/28/19  0400   BLOOD CULTURE   --   --  No Growth After 5 Days  No Growth After 5 Days  GRAM STAIN RESULT  No Polys or Bacteria seen  --   --   --    URINE CULTURE   --  >100,000 cfu/ml Escherichia coli ESBL*  10,000-19,000 cfu/ml Pseudomonas aeruginosa*  <10,000 cfu/ml Enterococcus species*  --   --        Last 24 Hours Medication List:     Current Facility-Administered Medications:  acetaminophen 650 mg Oral Q6H PRN Elina Rutherford PA-C    ertapenem 1,000 mg Intravenous Q24H Elina Rutherford PA-C Last Rate: 1,000 mg (11/02/19 1134)   heparin (porcine) 5,000 Units Subcutaneous Q8H [address] Marsha Vila PA-C    HYDROmorphone 0 5 mg Intravenous Q4H PRN Elina Rutherford PA-C    morphine injection 2 mg Intravenous Q3H PRN Elina Rutherford PA-C    ondansetron 4 mg Intravenous Q6H PRN Elina Rutherford PA-C    oxyCODONE 20 mg Oral TID Elina Rutherford PA-C    pantoprazole 20 mg Oral Early Morning Marsha Vila PA-C    sodium chloride 0 9 % with KCl 20 mEq/L 75 mL/hr Intravenous Continuous J Luis Oconnell MD Last Rate: 75 mL/hr (11/02/19 1224)   vancomycin 125 mg Oral Q12H [address] Elina Rutherford PA-C         Today, Patient Was Seen By: J Luis Oconnell MD    ** Please Note: Dictation voice to text software may have been used in the creation of this document   **

## 2019-11-02 NOTE — ASSESSMENT & PLAN NOTE
· Hemoglobin stable and looks at baseline      Lab Results   Component Value Date    HGB 9 1 (L) 11/02/2019    HGB 8 3 (L) 11/01/2019    HGB 8 4 (L) 10/30/2019

## 2019-11-02 NOTE — PLAN OF CARE
Problem: Potential for Falls  Goal: Patient will remain free of falls  Description  INTERVENTIONS:  - Assess patient frequently for physical needs  -  Identify cognitive and physical deficits and behaviors that affect risk of falls  -  [place] fall precautions as indicated by assessment   - Educate patient/family on patient safety including physical limitations  - Instruct patient to call for assistance with activity based on assessment  - Modify environment to reduce risk of injury  - Consider OT/PT consult to assist with strengthening/mobility  Outcome: Progressing     Problem: Prexisting or High Potential for Compromised Skin Integrity  Goal: Skin integrity is maintained or improved  Description  INTERVENTIONS:  - Identify patients at risk for skin breakdown  - Assess and monitor skin integrity  - Assess and monitor nutrition and hydration status  - Monitor labs   - Assess for incontinence   - Turn and reposition patient  - Assist with mobility/ambulation  - Relieve pressure over bony prominences  - Avoid friction and shearing  - Provide appropriate hygiene as needed including keeping skin clean and dry  - Evaluate need for skin moisturizer/barrier cream  - Collaborate with interdisciplinary team   - Patient/family teaching  - Consider wound care consult   Outcome: Progressing     Problem: Nutrition/Hydration-ADULT  Goal: Nutrient/Hydration intake appropriate for improving, restoring or maintaining nutritional needs  Description  Monitor and assess patient's nutrition/hydration status for malnutrition  Collaborate with interdisciplinary team and initiate plan and interventions as ordered  Monitor patient's weight and dietary intake as ordered or per policy  Utilize nutrition screening tool and intervene as necessary  Determine patient's food preferences and provide high-protein, high-caloric foods as appropriate       INTERVENTIONS:  - Monitor oral intake, urinary output, labs, and treatment plans  - Assess nutrition and hydration status and recommend course of action  - Evaluate amount of meals eaten  - Assist patient with eating if necessary   - Allow adequate time for meals  - Recommend/ encourage appropriate diets, oral nutritional supplements, and vitamin/mineral supplements  - Order, calculate, and assess calorie counts as needed  - Recommend, monitor, and adjust tube feedings and TPN/PPN based on assessed needs  - Assess need for intravenous fluids  - Provide specific nutrition/hydration education as appropriate  - Include patient/family/caregiver in decisions related to nutrition  Outcome: Progressing     Problem: PAIN - ADULT  Goal: Verbalizes/displays adequate comfort level or baseline comfort level  Description  Interventions:  - Encourage patient to monitor pain and request assistance  - Assess pain using appropriate pain scale  - Administer analgesics based on type and severity of pain and evaluate response  - Implement non-pharmacological measures as appropriate and evaluate response  - Consider cultural and social influences on pain and pain management  - Notify physician/advanced practitioner if interventions unsuccessful or patient reports new pain  Outcome: Progressing     Problem: INFECTION - ADULT  Goal: Absence or prevention of progression during hospitalization  Description  INTERVENTIONS:  - Assess and monitor for signs and symptoms of infection  - Monitor lab/diagnostic results  - Monitor all insertion sites, i e  indwelling lines, tubes, and drains  - Monitor endotracheal if appropriate and nasal secretions for changes in amount and color  - [place] appropriate cooling/warming therapies per order  - Administer medications as ordered  - Instruct and encourage patient and family to use good hand hygiene technique  - Identify and instruct in appropriate isolation precautions for identified infection/condition  Outcome: Progressing     Problem: SAFETY ADULT  Goal: Maintain or return to baseline ADL function  Description  INTERVENTIONS:  -  Assess patient's ability to carry out ADLs; assess patient's baseline for ADL function and identify physical deficits which impact ability to perform ADLs (bathing, care of mouth/teeth, toileting, grooming, dressing, etc )  - Assess/evaluate cause of self-care deficits   - Assess range of motion  - Assess patient's mobility; develop plan if impaired  - Assess patient's need for assistive devices and provide as appropriate  - Encourage maximum independence but intervene and supervise when necessary  - Involve family in performance of ADLs  - Assess for home care needs following discharge   - Consider OT consult to assist with ADL evaluation and planning for discharge  - Provide patient education as appropriate  Outcome: Progressing  Goal: Maintain or return mobility status to optimal level  Description  INTERVENTIONS:  - Assess patient's baseline mobility status (ambulation, transfers, stairs, etc )    - Identify cognitive and physical deficits and behaviors that affect mobility  - Identify mobility aids required to assist with transfers and/or ambulation (gait belt, sit-to-stand, lift, walker, cane, etc )  - [place] fall precautions as indicated by assessment  - Record patient progress and toleration of activity level on Mobility SBAR; progress patient to next Phase/Stage  - Instruct patient to call for assistance with activity based on assessment  - Consider rehabilitation consult to assist with strengthening/weightbearing, etc   Outcome: Progressing     Problem: DISCHARGE PLANNING  Goal: Discharge to home or other facility with appropriate resources  Description  INTERVENTIONS:  - Identify barriers to discharge w/patient and caregiver  - Arrange for needed discharge resources and transportation as appropriate  - Identify discharge learning needs (meds, wound care, etc )  - Arrange for interpretive services to assist at discharge as needed  - Refer to Case Management Department for coordinating discharge planning if the patient needs post-hospital services based on physician/advanced practitioner order or complex needs related to functional status, cognitive ability, or social support system  Outcome: Progressing     Problem: Knowledge Deficit  Goal: Patient/family/caregiver demonstrates understanding of disease process, treatment plan, medications, and discharge instructions  Description  Complete learning assessment and assess knowledge base    Interventions:  - Provide teaching at level of understanding  - Provide teaching via preferred learning methods  Outcome: Progressing     Problem: GASTROINTESTINAL - ADULT  Goal: Minimal or absence of nausea and/or vomiting  Description  INTERVENTIONS:  - Administer IV fluids if ordered to ensure adequate hydration  - Maintain NPO status until nausea and vomiting are resolved  - Nasogastric tube if ordered  - Administer ordered antiemetic medications as needed  - Provide nonpharmacologic comfort measures as appropriate  - Advance diet as tolerated, if ordered  - Consider nutrition services referral to assist patient with adequate nutrition and appropriate food choices  Outcome: Progressing  Goal: Maintains or returns to baseline bowel function  Description  INTERVENTIONS:  - Assess bowel function  - Encourage oral fluids to ensure adequate hydration  - Administer IV fluids if ordered to ensure adequate hydration  - Administer ordered medications as needed  - Encourage mobilization and activity  - Consider nutritional services referral to assist patient with adequate nutrition and appropriate food choices  Outcome: Progressing  Goal: Maintains adequate nutritional intake  Description  INTERVENTIONS:  - Monitor percentage of each meal consumed  - Identify factors contributing to decreased intake, treat as appropriate  - Assist with meals as needed  - Monitor I&O, weight, and lab values if indicated  - Obtain nutrition services referral as needed  Outcome: Progressing  Goal: Establish and maintain optimal ostomy function  Description  INTERVENTIONS:  - Assess bowel function  - Encourage oral fluids to ensure adequate hydration  - Administer IV fluids if ordered to ensure adequate hydration   - Administer ordered medications as needed  - Encourage mobilization and activity  - Nutrition services referral to assist patient with appropriate food choices  - Assess stoma site  - Consider wound care consult   Outcome: Progressing     Problem: GENITOURINARY - ADULT  Goal: Maintains or returns to baseline urinary function  Description  INTERVENTIONS:  - Assess urinary function  - Encourage oral fluids to ensure adequate hydration if ordered  - Administer IV fluids as ordered to ensure adequate hydration  - Administer ordered medications as needed  - Offer frequent toileting  - Follow urinary retention protocol if ordered  Outcome: Progressing  Goal: Absence of urinary retention  Description  INTERVENTIONS:  - Assess patients ability to void and empty bladder  - Monitor I/O  - Bladder scan as needed  - Discuss with physician/AP medications to alleviate retention as needed  - Discuss catheterization for long term situations as appropriate  Outcome: Progressing  Goal: Urinary catheter remains patent  Description  INTERVENTIONS:  - Assess patency of urinary catheter  - If patient has a chronic dangelo, consider changing catheter if non-functioning  - Follow guidelines for intermittent irrigation of non-functioning urinary catheter  Outcome: Progressing     Problem: SKIN/TISSUE INTEGRITY - ADULT  Goal: Skin integrity remains intact  Description  INTERVENTIONS  - Identify patients at risk for skin breakdown  - Assess and monitor skin integrity  - Assess and monitor nutrition and hydration status  - Monitor labs (i e  albumin)  - Assess for incontinence   - Turn and reposition patient  - Assist with mobility/ambulation  - Relieve pressure over bony prominences  - Avoid friction and shearing  - Provide appropriate hygiene as needed including keeping skin clean and dry  - Evaluate need for skin moisturizer/barrier cream  - Collaborate with interdisciplinary team (i e  Nutrition, Rehabilitation, etc )   - Patient/family teaching  Outcome: Progressing  Goal: Incision(s), wounds(s) or drain site(s) healing without S/S of infection  Description  INTERVENTIONS  - Assess and document risk factors for skin impairment   - Assess and document dressing, incision, wound bed, drain sites and surrounding tissue  - Consider nutrition services referral as needed  - Oral mucous membranes remain intact  - Provide patient/ family education  Outcome: Progressing  Goal: Oral mucous membranes remain intact  Description  INTERVENTIONS  - Assess oral mucosa and hygiene practices  - Implement preventative oral hygiene regimen  - Implement oral medicated treatments as ordered  - Initiate Nutrition services referral as needed  Outcome: Progressing     Problem: HEMATOLOGIC - ADULT  Goal: Maintains hematologic stability  Description  INTERVENTIONS  - Assess for signs and symptoms of bleeding or hemorrhage  - Monitor labs  - Administer supportive blood products/factors as ordered and appropriate  Outcome: Progressing     Problem: MUSCULOSKELETAL - ADULT  Goal: Maintain or return mobility to safest level of function  Description  INTERVENTIONS:  - Assess patient's ability to carry out ADLs; assess patient's baseline for ADL function and identify physical deficits which impact ability to perform ADLs (bathing, care of mouth/teeth, toileting, grooming, dressing, etc )  - Assess/evaluate cause of self-care deficits   - Assess range of motion  - Assess patient's mobility  - Assess patient's need for assistive devices and provide as appropriate  - Encourage maximum independence but intervene and supervise when necessary  - Involve family in performance of ADLs  - Assess for home care needs following discharge - Consider OT consult to assist with ADL evaluation and planning for discharge  - Provide patient education as appropriate  Outcome: Progressing  Goal: Maintain proper alignment of affected body part  Description  INTERVENTIONS:  - Support, maintain and protect limb and body alignment  - Provide patient/ family with appropriate education  Outcome: Progressing

## 2019-11-03 LAB
ANION GAP SERPL CALCULATED.3IONS-SCNC: 6 MMOL/L (ref 4–13)
BACTERIA SPEC ANAEROBE CULT: NORMAL
BACTERIA TISS AEROBE CULT: NORMAL
BASOPHILS # BLD AUTO: 0.03 THOUSANDS/ΜL (ref 0–0.1)
BASOPHILS NFR BLD AUTO: 0 % (ref 0–1)
BUN SERPL-MCNC: 12 MG/DL (ref 5–25)
CALCIUM SERPL-MCNC: 8.4 MG/DL (ref 8.3–10.1)
CHLORIDE SERPL-SCNC: 106 MMOL/L (ref 100–108)
CO2 SERPL-SCNC: 28 MMOL/L (ref 21–32)
CREAT SERPL-MCNC: 0.48 MG/DL (ref 0.6–1.3)
EOSINOPHIL # BLD AUTO: 0.3 THOUSAND/ΜL (ref 0–0.61)
EOSINOPHIL NFR BLD AUTO: 4 % (ref 0–6)
ERYTHROCYTE [DISTWIDTH] IN BLOOD BY AUTOMATED COUNT: 19.4 % (ref 11.6–15.1)
GFR SERPL CREATININE-BSD FRML MDRD: 121 ML/MIN/1.73SQ M
GLUCOSE SERPL-MCNC: 72 MG/DL (ref 65–140)
GRAM STN SPEC: NORMAL
HCT VFR BLD AUTO: 30 % (ref 36.5–49.3)
HGB BLD-MCNC: 8.7 G/DL (ref 12–17)
IMM GRANULOCYTES # BLD AUTO: 0.04 THOUSAND/UL (ref 0–0.2)
IMM GRANULOCYTES NFR BLD AUTO: 1 % (ref 0–2)
LYMPHOCYTES # BLD AUTO: 1.87 THOUSANDS/ΜL (ref 0.6–4.47)
LYMPHOCYTES NFR BLD AUTO: 23 % (ref 14–44)
MCH RBC QN AUTO: 23.5 PG (ref 26.8–34.3)
MCHC RBC AUTO-ENTMCNC: 29 G/DL (ref 31.4–37.4)
MCV RBC AUTO: 81 FL (ref 82–98)
MONOCYTES # BLD AUTO: 0.56 THOUSAND/ΜL (ref 0.17–1.22)
MONOCYTES NFR BLD AUTO: 7 % (ref 4–12)
NEUTROPHILS # BLD AUTO: 5.22 THOUSANDS/ΜL (ref 1.85–7.62)
NEUTS SEG NFR BLD AUTO: 65 % (ref 43–75)
NRBC BLD AUTO-RTO: 0 /100 WBCS
PLATELET # BLD AUTO: 456 THOUSANDS/UL (ref 149–390)
PMV BLD AUTO: 9.6 FL (ref 8.9–12.7)
POTASSIUM SERPL-SCNC: 3.7 MMOL/L (ref 3.5–5.3)
RBC # BLD AUTO: 3.71 MILLION/UL (ref 3.88–5.62)
SODIUM SERPL-SCNC: 140 MMOL/L (ref 136–145)
WBC # BLD AUTO: 8.02 THOUSAND/UL (ref 4.31–10.16)

## 2019-11-03 PROCEDURE — 80048 BASIC METABOLIC PNL TOTAL CA: CPT | Performed by: INTERNAL MEDICINE

## 2019-11-03 PROCEDURE — 99232 SBSQ HOSP IP/OBS MODERATE 35: CPT | Performed by: INTERNAL MEDICINE

## 2019-11-03 PROCEDURE — 85025 COMPLETE CBC W/AUTO DIFF WBC: CPT | Performed by: INTERNAL MEDICINE

## 2019-11-03 PROCEDURE — 99024 POSTOP FOLLOW-UP VISIT: CPT | Performed by: UROLOGY

## 2019-11-03 RX ADMIN — VANCOMYCIN HYDROCHLORIDE 125 MG: 500 INJECTION, POWDER, LYOPHILIZED, FOR SOLUTION INTRAVENOUS at 09:30

## 2019-11-03 RX ADMIN — ERTAPENEM SODIUM 1000 MG: 1 INJECTION, POWDER, LYOPHILIZED, FOR SOLUTION INTRAMUSCULAR; INTRAVENOUS at 12:09

## 2019-11-03 RX ADMIN — OXYCODONE HYDROCHLORIDE 20 MG: 10 TABLET ORAL at 15:06

## 2019-11-03 RX ADMIN — HEPARIN SODIUM 5000 UNITS: 5000 INJECTION INTRAVENOUS; SUBCUTANEOUS at 21:48

## 2019-11-03 RX ADMIN — HYDROMORPHONE HYDROCHLORIDE 0.5 MG: 1 INJECTION, SOLUTION INTRAMUSCULAR; INTRAVENOUS; SUBCUTANEOUS at 00:59

## 2019-11-03 RX ADMIN — HEPARIN SODIUM 5000 UNITS: 5000 INJECTION INTRAVENOUS; SUBCUTANEOUS at 05:14

## 2019-11-03 RX ADMIN — VANCOMYCIN HYDROCHLORIDE 125 MG: 500 INJECTION, POWDER, LYOPHILIZED, FOR SOLUTION INTRAVENOUS at 21:48

## 2019-11-03 RX ADMIN — SODIUM CHLORIDE AND POTASSIUM CHLORIDE 75 ML/HR: .9; .15 SOLUTION INTRAVENOUS at 01:43

## 2019-11-03 RX ADMIN — OXYCODONE HYDROCHLORIDE 20 MG: 10 TABLET ORAL at 21:48

## 2019-11-03 RX ADMIN — PANTOPRAZOLE SODIUM 20 MG: 20 TABLET, DELAYED RELEASE ORAL at 05:14

## 2019-11-03 RX ADMIN — OXYCODONE HYDROCHLORIDE 20 MG: 10 TABLET ORAL at 09:29

## 2019-11-03 RX ADMIN — HEPARIN SODIUM 5000 UNITS: 5000 INJECTION INTRAVENOUS; SUBCUTANEOUS at 14:41

## 2019-11-03 NOTE — PROGRESS NOTES
Progress Note - Greg Decker 1960, 63 y o  male MRN: [id number]    Unit/Bed#: [facility] 2 -01 Encounter: [id number]    Primary Care Provider: Ruben New MD   Date and time admitted to hospital: 10/28/2019  3:48 AM        Sepsis [facility])  Assessment & Plan  · Sepsis most likely secondary to UTI, infected penile prosthesis  · Sepsis now improved, status post penile prosthesis removal  · Urology evaluation appreciated; urology team planned to remove drain tomorrow and okay to DC  · As per ID last day antibiotics is 11/05/2019; we will keep patient until finishes antibiotics  · Two episodes of postop fever otherwise stable, continue same antibiotic and monitor vital signs  · Follow-up tissue culture    * SBO (small bowel obstruction) ([facility] [address] )  Assessment & Plan  · SOB improved now  · Continue to monitor    Diabetes mellitus type II, controlled [facility])  Assessment & Plan  Lab Results   Component Value Date    HGBA1C 5 2 05/06/2019     Recent Labs     11/01/19  0519 11/01/19  1215 11/01/19  1719   POCGLU 87 92 73     Blood Sugar Average: Last 72 hrs:  (P) 84   · Chest blood glucose 1 times daily    History of Clostridium difficile colitis  Assessment & Plan  Continue prophylactic oral vancomycin through 11/8/19      Decubitus ulcer of ischium, left, stage IV (HCC)  Assessment & Plan  · Chronic left decubitus ulcer recent with chronic osteomyelitis  · No evidence of acute infection  · Follow with wound care management plan and recommendation    Anemia  Assessment & Plan  · Hemoglobin stable and looks at baseline      Lab Results   Component Value Date    HGB 8 7 (L) 11/03/2019    HGB 9 1 (L) 11/02/2019    HGB 8 3 (L) 11/01/2019           VTE Pharmacologic Prophylaxis:   Pharmacologic: Heparin    Patient Centered Rounds: I have performed bedside rounds with nursing staff today    Discussions with Specialists or Other Care Team Provider:     Education and Discussions with Family / Patient:     Current Length of Stay: 6 day(s)    Current Patient Status: Inpatient   Certification Statement: The patient will continue to require additional inpatient hospital stay due to IV antibiotics for perineal infection    Discharge Plan:  On 2019    Code Status: Level 1 - Full Code      Subjective:   No complaints  No events overnight  Objective:     Vitals:   Temp (24hrs), Av 1 °F (37 3 °C), Min:98 5 °F (36 9 °C), Max:99 9 °F (37 7 °C)    Temp:  [98 5 °F (36 9 °C)-99 9 °F (37 7 °C)] 98 5 °F (36 9 °C)  HR:  [] 84  Resp:  [20-22] 20  BP: (120-125)/(75-83) 123/79  SpO2:  [92 %-97 %] 97 %  Body mass index is 23 03 kg/m²  Input and Output Summary (last 24 hours):        Intake/Output Summary (Last 24 hours) at 11/3/2019 1534  Last data filed at 11/3/2019 1233  Gross per 24 hour   Intake 998 75 ml   Output 2385 ml   Net -1386 25 ml       Physical Exam:     General appearance: alert  Head: Normocephalic, without obvious abnormality, atraumatic  Lungs: clear to auscultation bilaterally  Heart: regular rate and rhythm  Abdomen: soft, non-tender, positive bowel sounds   Back: Chronic left buttock ulcer, no surrounding erythema or discharge  Extremities: Extremity edema  Neurologic: Grossly normal    Additional Data:     Labs:    Results from last 7 days   Lab Units 19  0536   WBC Thousand/uL 8 02   HEMOGLOBIN g/dL 8 7*   HEMATOCRIT % 30 0*   PLATELETS Thousands/uL 456*   NEUTROS PCT % 65   LYMPHS PCT % 23   MONOS PCT % 7   EOS PCT % 4     Results from last 7 days   Lab Units 19  0536  10/29/19  0537   SODIUM mmol/L 140   < > 136   POTASSIUM mmol/L 3 7   < > 3 2*   CHLORIDE mmol/L 106   < > 104   CO2 mmol/L 28   < > 20*   BUN mg/dL 12   < > 14   CREATININE mg/dL 0 48*   < > 0 57*   ANION GAP mmol/L 6   < > 12   CALCIUM mg/dL 8 4   < > 8 4   ALBUMIN g/dL  --   --  1 6*   TOTAL BILIRUBIN mg/dL  --   --  0 71   ALK PHOS U/L  --   --  63   ALT U/L  --   --  7*   AST U/L  --   --  25   GLUCOSE RANDOM mg/dL 72   < > 64* < > = values in this interval not displayed  Results from last 7 days   Lab Units 11/01/19  1719 11/01/19  1215 11/01/19  0519 10/28/19  1350   POC GLUCOSE mg/dl 73 92 87 82         Results from last 7 days   Lab Units 10/28/19  0400   LACTIC ACID mmol/L 0 9           * I Have Reviewed All Lab Data Listed Above  * Additional Pertinent Lab Tests Reviewed: [address] Admission Reviewed    Imaging:    Imaging Reports Reviewed Today Include: images reviewed    Recent Cultures (last 7 days):     Results from last 7 days   Lab Units 11/01/19  1651 10/28/19  0608 10/28/19  0406 10/28/19  0400   BLOOD CULTURE   --   --  No Growth After 5 Days  No Growth After 5 Days  GRAM STAIN RESULT  No Polys or Bacteria seen  --   --   --    URINE CULTURE   --  >100,000 cfu/ml Escherichia coli ESBL*  10,000-19,000 cfu/ml Pseudomonas aeruginosa*  <10,000 cfu/ml Enterococcus species*  --   --        Last 24 Hours Medication List:     Current Facility-Administered Medications:  acetaminophen 650 mg Oral Q6H PRN Crystal Dolphin, PA-C    ertapenem 1,000 mg Intravenous Q24H Crystal Stuartn, PA-C Last Rate: 1,000 mg (11/03/19 1209)   heparin (porcine) 5,000 Units Subcutaneous Q8H [address] Marsha Vila PA-C    HYDROmorphone 0 5 mg Intravenous Q4H PRN Crystal Dolphin, PA-C    morphine injection 2 mg Intravenous Q3H PRN Crystal Dolphin, PA-C    ondansetron 4 mg Intravenous Q6H PRN Crystal Dolphin, PA-C    oxyCODONE 20 mg Oral TID Crystal Dolphin, PA-C    pantoprazole 20 mg Oral Early Morning Crystal Dolphin, PA-C    vancomycin 125 mg Oral Q12H [address] Crystal Tony PA-C         Today, Patient Was Seen By: Crystal Ramos MD    ** Please Note: Dictation voice to text software may have been used in the creation of this document   **

## 2019-11-03 NOTE — PROGRESS NOTES
UROLOGY PROGRESS NOTE   Patient Identifiers: Christiano Hollis (MRN [id number])  Date of Service: 11/3/2019        Assessment:   61-year-old gentleman postoperative day 2  Status post removal of penile prosthesis, doing well, drain output is decreasing, he has no complaints of uncontrolled pain today  Plan:   Agree with discharge to home tomorrow  Drain can be removed prior to discharge tomorrow, we will see the patient tomorrow for drain removal, he will then follow up with us in the office    Subjective:     24 HR EVENTS:   no significant events  Patient has  no complaints        Objective:     VITALS:    Vitals:    11/03/19 0722   BP: 125/83   Pulse: 71   Resp: 20   Temp: 99 °F (37 2 °C)   SpO2: 95%       INS & OUTS:    Reviewed pertinent values I's/O's      LABS:  Lab Results   Component Value Date    HGB 8 7 (L) 11/03/2019    HCT 30 0 (L) 11/03/2019    WBC 8 02 11/03/2019     (H) 11/03/2019   ]    Lab Results   Component Value Date     02/17/2014    K 3 7 11/03/2019     11/03/2019    CO2 28 11/03/2019    BUN 12 11/03/2019    CREATININE 0 48 (L) 11/03/2019    CALCIUM 8 4 11/03/2019    GLUCOSE 92 02/17/2014   ]    INPATIENT MEDS:    Current Facility-Administered Medications:     acetaminophen (TYLENOL) tablet 650 mg, 650 mg, Oral, Q6H PRN, Ranjit Campos PA-C, 650 mg at 11/01/19 2324    ertapenem (INVanz) 1,000 mg in sodium chloride 0 9 % 50 mL IVPB, 1,000 mg, Intravenous, Q24H, Ranjit Campos PA-C, Last Rate: 100 mL/hr at 11/03/19 1209, 1,000 mg at 11/03/19 1209    heparin (porcine) subcutaneous injection 5,000 Units, 5,000 Units, Subcutaneous, Q8H [address], Ranjit Campos PA-C, 5,000 Units at 11/03/19 0514    HYDROmorphone (DILAUDID) injection 0 5 mg, 0 5 mg, Intravenous, Q4H PRN, Ranjit Campos PA-C, 0 5 mg at 11/03/19 0059    morphine injection 2 mg, 2 mg, Intravenous, Q3H PRN, Ranjit Campos PA-C, 2 mg at 11/01/19 1954    ondansetron (ZOFRAN) injection 4 mg, 4 mg, Intravenous, Q6H PRN, Elina Rutherford PA-C, 4 mg at 11/01/19 1545    oxyCODONE (ROXICODONE) immediate release tablet 20 mg, 20 mg, Oral, TID, Elina Rutherford PA-C, 20 mg at 11/03/19 0929    pantoprazole (PROTONIX) EC tablet 20 mg, 20 mg, Oral, Early Morning, Marsha Vila PA-C, 20 mg at 11/03/19 0514    sodium chloride 0 9 % with KCl 20 mEq/L infusion (premix), 75 mL/hr, Intravenous, Continuous, J Luis Oconnell MD, Last Rate: 75 mL/hr at 11/03/19 0143, 75 mL/hr at 11/03/19 0143    vancomycin (VANCOCIN) oral solution 125 mg, 125 mg, Oral, Q12H [address], Elina Rutherford PA-C, 125 mg at 11/03/19 0930      Physical Exam:   /83   Pulse 71   Temp 99 °F (37 2 °C)   Resp 20   Ht 5' 7" (1 702 m)   Wt 66 7 kg (147 lb 0 8 oz)   SpO2 95%   BMI 23 03 kg/m²   GEN: alert and oriented x 3    RESP: breathing comfortably with no accessory muscle use    CARDIAC: peripheral pulses present    ABD: soft, non-tender, non-distended   INCISION: clean, dry, intact   EXT: Lower extremity amputation is present, left lower extremity is normal   DRAINS: serosanguineous  NEURO: cranial nerves 2-12 intact, no sensory deficits and no motor deficits   PSYCHIATRIC: normal mood and affect and normal train of thought    GENITOURINARY:no bladder tenderness    Scrotum is swollen, drain serosanguineous, colostomy is in place      JAY: in place draining clear yellow urine        RADIOLOGY:   No new films for my review

## 2019-11-03 NOTE — PLAN OF CARE
Problem: Potential for Falls  Goal: Patient will remain free of falls  Description  INTERVENTIONS:  - Assess patient frequently for physical needs  -  Identify cognitive and physical deficits and behaviors that affect risk of falls  -  [place] fall precautions as indicated by assessment   - Educate patient/family on patient safety including physical limitations  - Instruct patient to call for assistance with activity based on assessment  - Modify environment to reduce risk of injury  - Consider OT/PT consult to assist with strengthening/mobility  Outcome: Progressing     Problem: Prexisting or High Potential for Compromised Skin Integrity  Goal: Skin integrity is maintained or improved  Description  INTERVENTIONS:  - Identify patients at risk for skin breakdown  - Assess and monitor skin integrity  - Assess and monitor nutrition and hydration status  - Monitor labs   - Assess for incontinence   - Turn and reposition patient  - Assist with mobility/ambulation  - Relieve pressure over bony prominences  - Avoid friction and shearing  - Provide appropriate hygiene as needed including keeping skin clean and dry  - Evaluate need for skin moisturizer/barrier cream  - Collaborate with interdisciplinary team   - Patient/family teaching  - Consider wound care consult   Outcome: Progressing     Problem: Nutrition/Hydration-ADULT  Goal: Nutrient/Hydration intake appropriate for improving, restoring or maintaining nutritional needs  Description  Monitor and assess patient's nutrition/hydration status for malnutrition  Collaborate with interdisciplinary team and initiate plan and interventions as ordered  Monitor patient's weight and dietary intake as ordered or per policy  Utilize nutrition screening tool and intervene as necessary  Determine patient's food preferences and provide high-protein, high-caloric foods as appropriate       INTERVENTIONS:  - Monitor oral intake, urinary output, labs, and treatment plans  - Assess nutrition and hydration status and recommend course of action  - Evaluate amount of meals eaten  - Assist patient with eating if necessary   - Allow adequate time for meals  - Recommend/ encourage appropriate diets, oral nutritional supplements, and vitamin/mineral supplements  - Order, calculate, and assess calorie counts as needed  - Recommend, monitor, and adjust tube feedings and TPN/PPN based on assessed needs  - Assess need for intravenous fluids  - Provide specific nutrition/hydration education as appropriate  - Include patient/family/caregiver in decisions related to nutrition  Outcome: Progressing     Problem: PAIN - ADULT  Goal: Verbalizes/displays adequate comfort level or baseline comfort level  Description  Interventions:  - Encourage patient to monitor pain and request assistance  - Assess pain using appropriate pain scale  - Administer analgesics based on type and severity of pain and evaluate response  - Implement non-pharmacological measures as appropriate and evaluate response  - Consider cultural and social influences on pain and pain management  - Notify physician/advanced practitioner if interventions unsuccessful or patient reports new pain  Outcome: Progressing     Problem: INFECTION - ADULT  Goal: Absence or prevention of progression during hospitalization  Description  INTERVENTIONS:  - Assess and monitor for signs and symptoms of infection  - Monitor lab/diagnostic results  - Monitor all insertion sites, i e  indwelling lines, tubes, and drains  - Monitor endotracheal if appropriate and nasal secretions for changes in amount and color  - [place] appropriate cooling/warming therapies per order  - Administer medications as ordered  - Instruct and encourage patient and family to use good hand hygiene technique  - Identify and instruct in appropriate isolation precautions for identified infection/condition  Outcome: Progressing     Problem: SAFETY ADULT  Goal: Maintain or return to baseline ADL function  Description  INTERVENTIONS:  -  Assess patient's ability to carry out ADLs; assess patient's baseline for ADL function and identify physical deficits which impact ability to perform ADLs (bathing, care of mouth/teeth, toileting, grooming, dressing, etc )  - Assess/evaluate cause of self-care deficits   - Assess range of motion  - Assess patient's mobility; develop plan if impaired  - Assess patient's need for assistive devices and provide as appropriate  - Encourage maximum independence but intervene and supervise when necessary  - Involve family in performance of ADLs  - Assess for home care needs following discharge   - Consider OT consult to assist with ADL evaluation and planning for discharge  - Provide patient education as appropriate  Outcome: Progressing  Goal: Maintain or return mobility status to optimal level  Description  INTERVENTIONS:  - Assess patient's baseline mobility status (ambulation, transfers, stairs, etc )    - Identify cognitive and physical deficits and behaviors that affect mobility  - Identify mobility aids required to assist with transfers and/or ambulation (gait belt, sit-to-stand, lift, walker, cane, etc )  - [place] fall precautions as indicated by assessment  - Record patient progress and toleration of activity level on Mobility SBAR; progress patient to next Phase/Stage  - Instruct patient to call for assistance with activity based on assessment  - Consider rehabilitation consult to assist with strengthening/weightbearing, etc   Outcome: Progressing     Problem: DISCHARGE PLANNING  Goal: Discharge to home or other facility with appropriate resources  Description  INTERVENTIONS:  - Identify barriers to discharge w/patient and caregiver  - Arrange for needed discharge resources and transportation as appropriate  - Identify discharge learning needs (meds, wound care, etc )  - Arrange for interpretive services to assist at discharge as needed  - Refer to Case Management Department for coordinating discharge planning if the patient needs post-hospital services based on physician/advanced practitioner order or complex needs related to functional status, cognitive ability, or social support system  Outcome: Progressing     Problem: Knowledge Deficit  Goal: Patient/family/caregiver demonstrates understanding of disease process, treatment plan, medications, and discharge instructions  Description  Complete learning assessment and assess knowledge base    Interventions:  - Provide teaching at level of understanding  - Provide teaching via preferred learning methods  Outcome: Progressing     Problem: GASTROINTESTINAL - ADULT  Goal: Minimal or absence of nausea and/or vomiting  Description  INTERVENTIONS:  - Administer IV fluids if ordered to ensure adequate hydration  - Maintain NPO status until nausea and vomiting are resolved  - Nasogastric tube if ordered  - Administer ordered antiemetic medications as needed  - Provide nonpharmacologic comfort measures as appropriate  - Advance diet as tolerated, if ordered  - Consider nutrition services referral to assist patient with adequate nutrition and appropriate food choices  Outcome: Progressing  Goal: Maintains or returns to baseline bowel function  Description  INTERVENTIONS:  - Assess bowel function  - Encourage oral fluids to ensure adequate hydration  - Administer IV fluids if ordered to ensure adequate hydration  - Administer ordered medications as needed  - Encourage mobilization and activity  - Consider nutritional services referral to assist patient with adequate nutrition and appropriate food choices  Outcome: Progressing  Goal: Maintains adequate nutritional intake  Description  INTERVENTIONS:  - Monitor percentage of each meal consumed  - Identify factors contributing to decreased intake, treat as appropriate  - Assist with meals as needed  - Monitor I&O, weight, and lab values if indicated  - Obtain nutrition services referral as needed  Outcome: Progressing  Goal: Establish and maintain optimal ostomy function  Description  INTERVENTIONS:  - Assess bowel function  - Encourage oral fluids to ensure adequate hydration  - Administer IV fluids if ordered to ensure adequate hydration   - Administer ordered medications as needed  - Encourage mobilization and activity  - Nutrition services referral to assist patient with appropriate food choices  - Assess stoma site  - Consider wound care consult   Outcome: Progressing     Problem: GENITOURINARY - ADULT  Goal: Maintains or returns to baseline urinary function  Description  INTERVENTIONS:  - Assess urinary function  - Encourage oral fluids to ensure adequate hydration if ordered  - Administer IV fluids as ordered to ensure adequate hydration  - Administer ordered medications as needed  - Offer frequent toileting  - Follow urinary retention protocol if ordered  Outcome: Progressing  Goal: Absence of urinary retention  Description  INTERVENTIONS:  - Assess patients ability to void and empty bladder  - Monitor I/O  - Bladder scan as needed  - Discuss with physician/AP medications to alleviate retention as needed  - Discuss catheterization for long term situations as appropriate  Outcome: Progressing  Goal: Urinary catheter remains patent  Description  INTERVENTIONS:  - Assess patency of urinary catheter  - If patient has a chronic dangelo, consider changing catheter if non-functioning  - Follow guidelines for intermittent irrigation of non-functioning urinary catheter  Outcome: Progressing     Problem: SKIN/TISSUE INTEGRITY - ADULT  Goal: Skin integrity remains intact  Description  INTERVENTIONS  - Identify patients at risk for skin breakdown  - Assess and monitor skin integrity  - Assess and monitor nutrition and hydration status  - Monitor labs (i e  albumin)  - Assess for incontinence   - Turn and reposition patient  - Assist with mobility/ambulation  - Relieve pressure over bony prominences  - Avoid friction and shearing  - Provide appropriate hygiene as needed including keeping skin clean and dry  - Evaluate need for skin moisturizer/barrier cream  - Collaborate with interdisciplinary team (i e  Nutrition, Rehabilitation, etc )   - Patient/family teaching  Outcome: Progressing  Goal: Incision(s), wounds(s) or drain site(s) healing without S/S of infection  Description  INTERVENTIONS  - Assess and document risk factors for skin impairment   - Assess and document dressing, incision, wound bed, drain sites and surrounding tissue  - Consider nutrition services referral as needed  - Oral mucous membranes remain intact  - Provide patient/ family education  Outcome: Progressing  Goal: Oral mucous membranes remain intact  Description  INTERVENTIONS  - Assess oral mucosa and hygiene practices  - Implement preventative oral hygiene regimen  - Implement oral medicated treatments as ordered  - Initiate Nutrition services referral as needed  Outcome: Progressing     Problem: HEMATOLOGIC - ADULT  Goal: Maintains hematologic stability  Description  INTERVENTIONS  - Assess for signs and symptoms of bleeding or hemorrhage  - Monitor labs  - Administer supportive blood products/factors as ordered and appropriate  Outcome: Progressing     Problem: MUSCULOSKELETAL - ADULT  Goal: Maintain or return mobility to safest level of function  Description  INTERVENTIONS:  - Assess patient's ability to carry out ADLs; assess patient's baseline for ADL function and identify physical deficits which impact ability to perform ADLs (bathing, care of mouth/teeth, toileting, grooming, dressing, etc )  - Assess/evaluate cause of self-care deficits   - Assess range of motion  - Assess patient's mobility  - Assess patient's need for assistive devices and provide as appropriate  - Encourage maximum independence but intervene and supervise when necessary  - Involve family in performance of ADLs  - Assess for home care needs following discharge - Consider OT consult to assist with ADL evaluation and planning for discharge  - Provide patient education as appropriate  Outcome: Progressing  Goal: Maintain proper alignment of affected body part  Description  INTERVENTIONS:  - Support, maintain and protect limb and body alignment  - Provide patient/ family with appropriate education  Outcome: Progressing

## 2019-11-03 NOTE — ASSESSMENT & PLAN NOTE
· Sepsis most likely secondary to UTI, infected penile prosthesis  · Sepsis now improving, status post penile prosthesis removal  · Urology evaluation appreciated; urology team planned to remove drain tomorrow and okay to DC  · As per ID last day antibiotics is 11/05/2019; we will keep patient until finishes antibiotics    · Two episodes of postop fever otherwise stable, continue same antibiotic and monitor vital signs  · Follow-up tissue culture

## 2019-11-03 NOTE — ASSESSMENT & PLAN NOTE
· Hemoglobin stable and looks at baseline      Lab Results   Component Value Date    HGB 8 7 (L) 11/03/2019    HGB 9 1 (L) 11/02/2019    HGB 8 3 (L) 11/01/2019

## 2019-11-03 NOTE — PROGRESS NOTES
Obtained responsibility of patient care at 1500  Patient assessment in agreement with previous RN assessment  Wound care and suprapubic catheter care completed by nursing students  Patient resting in bed with call bell within reach  Will continue to monitor      Manohar Domingo RN 11/3/2019 4:00 PM

## 2019-11-04 ENCOUNTER — TELEPHONE (OUTPATIENT)
Dept: OTHER | Facility: HOSPITAL | Age: 59
End: 2019-11-04

## 2019-11-04 PROBLEM — T83.61XD: Status: RESOLVED | Noted: 2019-08-23 | Resolved: 2019-11-04

## 2019-11-04 LAB
ANION GAP SERPL CALCULATED.3IONS-SCNC: 6 MMOL/L (ref 4–13)
BASOPHILS # BLD AUTO: 0.03 THOUSANDS/ΜL (ref 0–0.1)
BASOPHILS NFR BLD AUTO: 0 % (ref 0–1)
BUN SERPL-MCNC: 15 MG/DL (ref 5–25)
CALCIUM SERPL-MCNC: 8.4 MG/DL (ref 8.3–10.1)
CHLORIDE SERPL-SCNC: 102 MMOL/L (ref 100–108)
CO2 SERPL-SCNC: 28 MMOL/L (ref 21–32)
CREAT SERPL-MCNC: 0.48 MG/DL (ref 0.6–1.3)
EOSINOPHIL # BLD AUTO: 0.48 THOUSAND/ΜL (ref 0–0.61)
EOSINOPHIL NFR BLD AUTO: 6 % (ref 0–6)
ERYTHROCYTE [DISTWIDTH] IN BLOOD BY AUTOMATED COUNT: 19.3 % (ref 11.6–15.1)
GFR SERPL CREATININE-BSD FRML MDRD: 121 ML/MIN/1.73SQ M
GLUCOSE SERPL-MCNC: 77 MG/DL (ref 65–140)
HCT VFR BLD AUTO: 29.7 % (ref 36.5–49.3)
HGB BLD-MCNC: 8.6 G/DL (ref 12–17)
IMM GRANULOCYTES # BLD AUTO: 0.04 THOUSAND/UL (ref 0–0.2)
IMM GRANULOCYTES NFR BLD AUTO: 1 % (ref 0–2)
LYMPHOCYTES # BLD AUTO: 1.79 THOUSANDS/ΜL (ref 0.6–4.47)
LYMPHOCYTES NFR BLD AUTO: 22 % (ref 14–44)
MCH RBC QN AUTO: 23.2 PG (ref 26.8–34.3)
MCHC RBC AUTO-ENTMCNC: 29 G/DL (ref 31.4–37.4)
MCV RBC AUTO: 80 FL (ref 82–98)
MONOCYTES # BLD AUTO: 0.56 THOUSAND/ΜL (ref 0.17–1.22)
MONOCYTES NFR BLD AUTO: 7 % (ref 4–12)
NEUTROPHILS # BLD AUTO: 5.3 THOUSANDS/ΜL (ref 1.85–7.62)
NEUTS SEG NFR BLD AUTO: 64 % (ref 43–75)
NRBC BLD AUTO-RTO: 0 /100 WBCS
PLATELET # BLD AUTO: 445 THOUSANDS/UL (ref 149–390)
PMV BLD AUTO: 9.7 FL (ref 8.9–12.7)
POTASSIUM SERPL-SCNC: 3.6 MMOL/L (ref 3.5–5.3)
RBC # BLD AUTO: 3.7 MILLION/UL (ref 3.88–5.62)
SODIUM SERPL-SCNC: 136 MMOL/L (ref 136–145)
WBC # BLD AUTO: 8.2 THOUSAND/UL (ref 4.31–10.16)

## 2019-11-04 PROCEDURE — 99232 SBSQ HOSP IP/OBS MODERATE 35: CPT | Performed by: HOSPITALIST

## 2019-11-04 PROCEDURE — 99233 SBSQ HOSP IP/OBS HIGH 50: CPT | Performed by: INTERNAL MEDICINE

## 2019-11-04 PROCEDURE — 99024 POSTOP FOLLOW-UP VISIT: CPT | Performed by: PHYSICIAN ASSISTANT

## 2019-11-04 PROCEDURE — 80048 BASIC METABOLIC PNL TOTAL CA: CPT | Performed by: INTERNAL MEDICINE

## 2019-11-04 PROCEDURE — 85025 COMPLETE CBC W/AUTO DIFF WBC: CPT | Performed by: INTERNAL MEDICINE

## 2019-11-04 RX ADMIN — VANCOMYCIN HYDROCHLORIDE 125 MG: 500 INJECTION, POWDER, LYOPHILIZED, FOR SOLUTION INTRAVENOUS at 08:25

## 2019-11-04 RX ADMIN — HEPARIN SODIUM 5000 UNITS: 5000 INJECTION INTRAVENOUS; SUBCUTANEOUS at 05:21

## 2019-11-04 RX ADMIN — OXYCODONE HYDROCHLORIDE 20 MG: 10 TABLET ORAL at 15:05

## 2019-11-04 RX ADMIN — OXYCODONE HYDROCHLORIDE 20 MG: 10 TABLET ORAL at 08:25

## 2019-11-04 RX ADMIN — OXYCODONE HYDROCHLORIDE 20 MG: 10 TABLET ORAL at 21:30

## 2019-11-04 RX ADMIN — HYDROMORPHONE HYDROCHLORIDE 0.5 MG: 1 INJECTION, SOLUTION INTRAMUSCULAR; INTRAVENOUS; SUBCUTANEOUS at 01:49

## 2019-11-04 RX ADMIN — PANTOPRAZOLE SODIUM 20 MG: 20 TABLET, DELAYED RELEASE ORAL at 05:22

## 2019-11-04 RX ADMIN — HEPARIN SODIUM 5000 UNITS: 5000 INJECTION INTRAVENOUS; SUBCUTANEOUS at 21:30

## 2019-11-04 RX ADMIN — HEPARIN SODIUM 5000 UNITS: 5000 INJECTION INTRAVENOUS; SUBCUTANEOUS at 14:58

## 2019-11-04 RX ADMIN — VANCOMYCIN HYDROCHLORIDE 125 MG: 500 INJECTION, POWDER, LYOPHILIZED, FOR SOLUTION INTRAVENOUS at 21:30

## 2019-11-04 RX ADMIN — ERTAPENEM SODIUM 1000 MG: 1 INJECTION, POWDER, LYOPHILIZED, FOR SOLUTION INTRAMUSCULAR; INTRAVENOUS at 11:09

## 2019-11-04 NOTE — PROGRESS NOTES
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, subsequent encounter  Assessment & Plan  3 Days Post-Op  Procedure(s):  REMOVAL PROSTHESIS PENILE  Surgeon(s):  MD Jannie Charles PA-C Maylene Leiter, MD  11/1/2019    Doing great  Drain removed at the bedside today  OK to discharge home from Urology standpoint  Outpatient follow up  Bedside rounds performed with RN  Discussed with Dr Tristian Cleveland of [facility]  Urology will sign off but remain available for any further inpatient needs  Please feel free to call with questions or concerns  Subjective/Objective     Subjective:   CC: "I'm better and ready to go home"  HPI:  Rozina Aguilar is looking forward to being home and sleeping in his own bed after this long hospitalization  Reporting minimal surgical site pain  ROS:  Review of Systems   Constitutional: Negative for activity change and appetite change  HENT: Negative for congestion and ear pain  Eyes: Negative for pain  Respiratory: Negative for cough and shortness of breath  Cardiovascular: Negative for chest pain and palpitations  Gastrointestinal: Negative for abdominal distention, abdominal pain, blood in stool, constipation, diarrhea and nausea  Genitourinary: Negative for difficulty urinating, dysuria, flank pain and hematuria  Musculoskeletal: Negative for arthralgias and myalgias  Skin: Negative for rash  Allergic/Immunologic: Negative for immunocompromised state  Neurological: Negative for dizziness and headaches  Hematological: Negative for adenopathy  Does not bruise/bleed easily  Psychiatric/Behavioral: Negative for agitation  The patient is not nervous/anxious  Objective:  Vitals: Blood pressure 125/89, pulse 75, temperature 98 1 °F (36 7 °C), resp   rate 17, height 5' 7" (1 702 m), weight 66 7 kg (147 lb 0 8 oz), SpO2 98 %  ,Body mass index is 23 03 kg/m²  Intake/Output Summary (Last 24 hours) at 11/4/2019 1520  Last data filed at 11/4/2019 1506  Gross per 24 hour   Intake 1080 ml   Output 1710 ml   Net -630 ml     Invasive Devices     Peripheral Intravenous Line            Peripheral IV 11/02/19 Left;Proximal;Ventral (anterior) Forearm 2 days          Drain            Colostomy Descending/sigmoid LLQ -- days    Suprapubic Catheter Non-latex 16 Fr  45 days    Closed/Suction Drain Anterior Other (Comment) Bulb 19 Fr  2 days                Physical Exam   Constitutional: He is oriented to person, place, and time  He is cooperative  He does not appear ill  No distress  63year old male resting comfortably in bed  HENT:   Head: Normocephalic and atraumatic  Moist mucous membranes  Eyes: Conjunctivae and EOM are normal    Neck: Normal range of motion  Neck supple  No tracheal deviation present  Cardiovascular: Normal rate, regular rhythm and normal heart sounds  No murmur heard  Pulmonary/Chest: Effort normal and breath sounds normal  No respiratory distress  He has no wheezes  Good airflow bilaterally on deep inspiration  Abdominal: Soft  Bowel sounds are normal  He exhibits no distension and no mass  There is no tenderness  Obese with midline incision, L sided ostomy, pink and viable with gas and stool in the bag  SPT draining clear yellow urine  Genitourinary:   Genitourinary Comments: Some dry skin flaking off his scrotum  Midline incision with skin glue in place  L scrotal BALTAZAR drain removed without event  DSD applied  Patient tolerated this well  Musculoskeletal: Normal range of motion  He exhibits deformity (Right lower extremity status post right hip disarticulation)  He exhibits no edema  Neurological: He is alert and oriented to person, place, and time  Skin: Skin is warm and dry  No rash noted  He is not diaphoretic  No erythema  No pallor     Psychiatric: He has a normal mood and affect  His behavior is normal  Judgment and thought content normal    Nursing note and vitals reviewed        History:    Past Medical History:   Diagnosis Date    Anemia     Blind     r eye    Chronic cystitis     Colostomy in place [facility])     Detrusor sphincter dyssynergia     Diabetes mellitus ([facility] )     Poorly controlled type 2; Last Assessed:  3/18/14    Erectile dysfunction     Frequency of urination     GERD (gastroesophageal reflux disease)     History of diabetes mellitus     History of osteomyelitis     Hx [facility] leg [facility]     r high upper leg    Hyperlipidemia     Hypertension     Hypospadias     Incomplete bladder emptying     Neurogenic bladder     Paralysis ([facility] )     Paraplegia ([facility])     Spinal cord cysts     Ulcer of sacral region ([facility] )     Urge incontinence      Past Surgical History:   Procedure Laterality Date    AMPUTATION      At hip; Last Assessed:  1/19/16    BLADDER SURGERY      COLON SURGERY      llq ostomy pouch    COLOSTOMY      COMPLEX CYSTOMETROGRAM  2014    CT CYSTOGRAM  9/19/2019    CYSTOSCOPY  2014    IR PICC REPO  9/23/2019    IR SUPRAPUBIC TUBE  9/19/2019    LEG AMPUTATION      MEATOTOMY      PENILE PROSTHESIS  REMOVAL N/A 11/1/2019    Procedure: REMOVAL PROSTHESIS PENILE;  Surgeon: Matt Moore MD;  Location: [facility];  Service: Urology    PENILE PROSTHESIS IMPLANT  2011    MN ADJ TISS XFER SCALP,EXTREM 10 1-30 SQCM Left 5/1/2017    Procedure: POSTERIOR THIGH V-Y ADMANCEMENT;  Surgeon: Jennifer Love MD;  [facility];  Service: Plastics    MN MUSCLE-SKIN FLAP,TRUNK Left 5/1/2017    Procedure: FLAP CLOSURE LEFT ISCHIAL WOUND and "RIGHT" ISCHIAL FLAP ADVANCEMENT * DEBRIDEMENT, VAC PLACEMENT ;  Surgeon: Jennifer Love MD;  [facility];  Service: Plastics    MN MUSCLE-SKIN FLAP,TRUNK Left 9/27/2017    Procedure: gluteal myocutaneous rotational flap, posterior thigh v to y advancement- wound 5 x 2 5 x 8; Surgeon: Pretty Maciel MD;  [facility];  Service: Plastics    SPINE SURGERY      Lower back    UROFLOWMETRY SIMPLE / COMPLEX  2014     Family History   Problem Relation Age of Onset    No Known Problems Mother     No Known Problems Father      Social History     Socioeconomic History    Marital status: /Civil Union     Spouse name: None    Number of children: None    Years of education: None    Highest education level: None   Occupational History    None   Social Needs    Financial resource strain: None    Food insecurity:     Worry: None     Inability: None    Transportation needs:     Medical: None     Non-medical: None   Tobacco Use    Smoking status: Former Smoker     Packs/day: 0 50     Years: 10 00     Pack years: 5 00     Last attempt to quit: [facility]     Years since quittin 8    Smokeless tobacco: Never Used    Tobacco comment: Onset date:  11/10/17   Substance and Sexual Activity    Alcohol use: Never     Frequency: Never     Comment: Per [employer]:  Social drinker (Onset date:  11/10/17)    Drug use: No    Sexual activity: None   Lifestyle    Physical activity:     Days per week: None     Minutes per session: None    Stress: None   Relationships    Social connections:     Talks on phone: None     Gets together: None     Attends Church service: None     Active member of club or organization: None     Attends meetings of clubs or organizations: None     Relationship status: None    Intimate partner violence:     Fear of current or ex partner: None     Emotionally abused: None     Physically abused: None     Forced sexual activity: None   Other Topics Concern    None   Social History Narrative    [place] [address] [place] [address] Dr Ontiveros    Social history reviewed, unchanged       Labs:  Results from last 7 days   Lab Units 19  0618 19  0536 19  0405   WBC Thousand/uL 8 20 8 02 7 09   HEMOGLOBIN g/dL 8 6* 8 7* 9 1*   PLATELETS Thousands/uL 445* 456* 456*     Results from last 7 days   Lab Units 11/04/19  0618 11/03/19  0536 11/02/19  0954  10/29/19  0537   SODIUM mmol/L 136 140 138   < > 136   POTASSIUM mmol/L 3 6 3 7 3 1*   < > 3 2*   CHLORIDE mmol/L 102 106 104   < > 104   CO2 mmol/L 28 28 28   < > 20*   BUN mg/dL 15 12 6   < > 14   CREATININE mg/dL 0 48* 0 48* 0 56*   < > 0 57*   EGFR ml/min/1 73sq m 121 121 114   < > 113   CALCIUM mg/dL 8 4 8 4 8 4   < > 8 4   AST U/L  --   --   --   --  25   ALT U/L  --   --   --   --  7*   ALK PHOS U/L  --   --   --   --  63    < > = values in this interval not displayed       Results from last 7 days   Lab Units 11/01/19  1651   GRAM STAIN RESULT  No Polys or Bacteria seen           Sj Ng PA-C  Date: 11/4/2019 Time: 3:20 PM

## 2019-11-04 NOTE — TELEPHONE ENCOUNTER
Patient is status post explant of IPP on 11/1/2019 by Dr Philip Dubois  Please schedule him for follow-up in 2 weeks to see Dr Philip Dubois or advanced practitioner for incision check  We can also cancel his appointment with Dee on 12/17/2019, as that with his postoperative visit from planned explant surgery in December      Mariana García PA-C  Date: 11/4/2019 Time: 3:22 PM

## 2019-11-04 NOTE — WOUND OSTOMY CARE
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  Incision to midline scrotum dry, approximated, open to air  Scar tissue to bilateral buttocks, sacrum, left knee, left heel, left posterior thigh, and abdomen  Colostomy with pouching system intact--patient denies any colostomy issues  1   Present on admission stage 4 pressure injury to left distal buttock/ischium--wound bed clean with 100% pink tissue  Tunneling and undermining present  2   Present on admission traumatic wound with dry black eschar to left medial knee-eschar has lifted, wound bed 100% pink with epithelialization  3   Present on admission traumatic wound to left posterior thigh--RESOLVED  4  Present on admission traumatic wound vs stage 2 pressure injury to left mid buttock--wound oval over area of scar tissue  Healing  5   Present on admission stage 2 pressure injury to right distal buttock--healing      See flowsheet and media for wound details  Plan:   1-Hydraguard lotion to left heel BID and PRN  2-Elevate left heel to offload pressure  3-EHOB offloading cushion in chair when out of bed  4-Moisturize skin daily with skin nourishing cream   5-Turn/reposition q2h or when medically stable for pressure re-distribution on skin--use positioning wedges  6-P500 low air-loss mattress  7-Left distal buttock/ischial wound--cleanse with normal saline  3M Cavilon skin barrier film to gaby-wound skin  Moisten jagjit packing with Silvasorb gel (smear gel onto jagjit) and pack wound loosely  Cover with 4x4 and ABD  Secure with tape  8-Apply Allevyn Life foam dressings to left knee, buttock, and posterior thigh wounds    Lupillo with T   Change dressing every 3 days and PRN  9-Wound care team to follow      Plan of care reviewed with primary RN      Patient should be discharged with VNA and follow-up at wound center  Wound 08/26/19 Pressure Injury Buttocks Left;Distal;Lateral (Active)   Wound Image   11/4/2019 10:12 AM   Wound Description Pink 11/4/2019 10:12 AM   Staging Stage IV 11/4/2019 10:12 AM   Shelby-wound Assessment Maceration;Erythema 11/4/2019 10:12 AM   Wound Length (cm) 5 6 cm 11/4/2019 10:12 AM   Wound Width (cm) 3 cm 11/4/2019 10:12 AM   Wound Depth (cm) 2 2 11/4/2019 10:12 AM   Calculated Wound Area (cm^2) 16 8 cm^2 11/4/2019 10:12 AM   Calculated Wound Volume (cm^3) 36 96 cm^3 11/4/2019 10:12 AM   Change in Wound Size % -39185 11/4/2019 10:12 AM   Tunneling in depth located at 7 cm at 12 o'clock, 5 cm at 3 o'clock 11/4/2019 10:12 AM   Undermining is depth extending from 8-4 o'clock (deepest 4 5 cm at 9 o'clock)  11/4/2019 10:12 AM   Drainage Amount Moderate 11/4/2019 10:12 AM   Drainage Description Serosanguineous 11/4/2019 10:12 AM   Non-staged Wound Description Full thickness 11/4/2019 10:12 AM   Treatments Cleansed;Irrigation with NSS 11/4/2019 10:12 AM   Dressing Hydrogel;Dry dressing;Packings 11/4/2019 10:12 AM   Wound packed? Yes 11/4/2019 10:12 AM   Packing- # removed 1 11/4/2019 10:12 AM   Packing- # inserted 1 11/4/2019 10:12 AM   Dressing Changed Changed 11/4/2019 10:12 AM   Patient Tolerance Tolerated well 11/4/2019 10:12 AM   Dressing Status Clean;Dry; Intact 11/4/2019 10:12 AM       Wound 09/16/19 Pressure Injury Buttocks Right;Distal (Active)   Wound Image   11/4/2019 10:14 AM   Wound Description Pink 11/4/2019 10:14 AM   Staging Stage II 11/4/2019 10:14 AM   Shelby-wound Assessment Maceration;Pink;Fragile 11/4/2019 10:14 AM   Wound Length (cm) 1 cm 11/4/2019 10:14 AM   Wound Width (cm) 0 2 cm 11/4/2019 10:14 AM   Wound Depth (cm) 0 1 11/4/2019 10:14 AM   Calculated Wound Area (cm^2) 0 2 cm^2 11/4/2019 10:14 AM   Calculated Wound Volume (cm^3) 0 02 cm^3 11/4/2019 10:14 AM   Change in Wound Size % 93 33 11/4/2019 10:14 AM   Drainage Amount Scant 11/4/2019 10:14 AM   Drainage Description Serosanguineous 11/4/2019 10:14 AM   Non-staged Wound Description Partial thickness 11/4/2019 10:14 AM   Treatments Cleansed 11/4/2019 10:14 AM   Dressing Protective barrier 11/4/2019 10:14 AM   Dressing Changed New 10/28/2019 10:58 AM   Patient Tolerance Tolerated well 11/4/2019 10:14 AM   Dressing Status Clean;Dry; Intact 11/4/2019 10:14 AM       Wound 10/28/19 Traumatic Knee Left (Active)   Wound Image   11/4/2019 10:28 AM   Wound Description Pink 11/4/2019 10:28 AM   Staging Stage II 11/3/2019 12:50 AM   Shelby-wound Assessment Clean;Dry; Intact 11/4/2019 10:28 AM   Wound Length (cm) 2 cm 11/4/2019 10:28 AM   Wound Width (cm) 2 cm 11/4/2019 10:28 AM   Wound Depth (cm) 0 1 11/4/2019 10:28 AM   Calculated Wound Area (cm^2) 4 cm^2 11/4/2019 10:28 AM   Calculated Wound Volume (cm^3) 0 4 cm^3 11/4/2019 10:28 AM   Drainage Amount Small 11/4/2019 10:28 AM   Drainage Description Serosanguineous 11/4/2019 10:28 AM   Non-staged Wound Description Partial thickness 11/4/2019 10:28 AM   Treatments Cleansed 11/4/2019 10:28 AM   Dressing Foam, Silicon (eg  Allevyn, etc) 11/4/2019 10:28 AM   Dressing Changed Changed 11/4/2019 10:28 AM   Patient Tolerance Tolerated well 11/4/2019 10:28 AM   Dressing Status Clean; Intact;Dry 11/4/2019 10:28 AM       Wound 10/28/19 Pressure Injury Buttocks Left;Mid (Active)   Wound Image   11/4/2019 10:15 AM   Wound Description Pink 11/4/2019 10:15 AM   Staging Stage II 11/4/2019 10:15 AM   Shelby-wound Assessment Erythema 11/4/2019 10:15 AM   Wound Length (cm) 0 5 cm 11/4/2019 10:15 AM   Wound Width (cm) 1 2 cm 11/4/2019 10:15 AM   Wound Depth (cm) 0 1 11/4/2019 10:15 AM   Calculated Wound Area (cm^2) 0 6 cm^2 11/4/2019 10:15 AM   Calculated Wound Volume (cm^3) 0 06 cm^3 11/4/2019 10:15 AM   Change in Wound Size % 53 85 11/4/2019 10:15 AM Drainage Amount Scant 11/4/2019 10:15 AM   Drainage Description Serosanguineous 11/4/2019 10:15 AM   Non-staged Wound Description Partial thickness 11/4/2019 10:15 AM   Treatments Cleansed 11/4/2019 10:15 AM   Dressing Foam, Silicon (eg  Allevyn, etc) 11/4/2019 10:15 AM   Dressing Changed Changed 11/4/2019 10:15 AM   Patient Tolerance Tolerated well 11/4/2019 10:15 AM   Dressing Status Clean;Dry; Intact 11/4/2019 10:15 AM       Wound 11/01/19 Incision Other (Comment) N/A (Active)   Wound Description Clean;Dry; Intact 11/4/2019 10:16 AM   Shelby-wound Assessment Clean;Dry; Intact 11/4/2019 10:16 AM   Closure Approximated;Open to air 11/4/2019 10:16 AM   Drainage Amount None 11/4/2019 10:16 AM   Dressing Open to air 11/4/2019 10:16 AM     Haja Menjivar BSN, RN, [employer]

## 2019-11-04 NOTE — SOCIAL WORK
Informed pt's last dose of Ertepenem at 1200 on 11/5 with anticipated d/c thereafter as pt is medically stable- BLS transport arranged for pt to return home 11/5 at 2pm   Attending/RN/Pt made aware of same  No further questions/concerns at this time  No barriers to d/c identified however will continue to follow should any arise

## 2019-11-04 NOTE — ASSESSMENT & PLAN NOTE
· Sepsis most likely secondary to UTI, infected penile prosthesis  · Sepsis now improving, status post penile prosthesis removal  · Last day of IV abx tomorrow  · Patient can go home tomorrow

## 2019-11-04 NOTE — PROGRESS NOTES
Progress Note - Infectious Disease   Leila 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  Also consider secondary to infected penile prosthesis which was exposed in patient's chronic buttock decubitus  He is now status post removal of the penile prosthesis  Also consider secondary to ileus versus small-bowel obstruction although this is an unlikely source of fever   Blood cultures are negative after 5 days   Fortunately the patient remains clinically stable and nontoxic   His fever curve has trended down   WBC count has normalized   The patient is currently receiving IV Ertapenem and is tolerating without difficulty  -continue IV ertapenem through 11/05/2019 for total of seven days of appropriate antibiotic treatment  -monitor CBC and BMP  -monitor vitals  -supportive care     2  Possible ESBL E coli UTI  UA collected from suprapubic tube did appear abnormal  He unfortunately has a 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, MRSA, and e coli ESBL  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  New concern with further erosion as his penile prosthesis was exposed in the wound base  It is now removed  Unclear if urinary bacteria are playing a part in this erosion    -antibiotic as above  -monitor CBC and BMP  -monitor vitals  -serial suprapubic tube examination and care  -monitor urine output     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 implant noted in left buttock decubitus ulcer  Urology has now removed the implant as well as the reservoir   -continue close follow-up with Urology     4  Small bowel obstruction verses ileus   CT of the abdomen and pelvis was concerning for dilation of his proximal and mid jejunum consistent with a bowel obstruction  Two small fluid collections were also noted within the right pelvis which were new when compared to previous pelvic imaging   He he reported no output from his ostomy since 10/26/2019 but began seeing stool output again on 10/29/2019  Today his abdominal exam is benign and he continues without acute stool output  He is tolerating PO intake without difficulty  Tamir Hart continues to follow closely with General surgery   -serial abdominal exams  -serial ostomy examination and care  -monitor GI symptoms  -monitor stool output  -continue close follow up with general surgery     5  Chronic left buttock decubitus ulceration   POA   With chronic underlying osteomyelitis  And new finding of exposed penile prosthesis in the wound base  Prosthesis has been removed  Wound has had no purulent drainage and has not appeared cellulitic on exam  Wound management is following and have made wound care recommendations   -serial wound exams  -local wound care per wound management team  -continue follow up with the wound management team     6  History of C diff   Patient currently without output in his ostomy   Concern for small bowel obstruction versus ileus  He will still require PO vancomycin prophylaxis as he receives above antibiotics and for 72 hours after their completion   -continue PO vancomycin prophylaxis through 11/08/2019  -monitor stool output  -monitor abdominal symptoms     7  History of resistant organisms including ESBL E coli, MRSA, and Pseudomonas      8  Paraplegia  Above plan was discussed in detail with patient at the bedside  Above plan was discussed in detail with [facility] attending, Dr Milton Jacobson  Antibiotics:  Ertapenem 6  PO Vancomycin 8  Antibiotics 8  Postop 3    Subjective:  Patient reports he is feeling great today    States he feels immensely relieved that his implant is out and is hoping that moving forward will no longer have problems with recurrent infections  He is eager to go home  He has no fever, chills, sweats, shakes; no nausea, vomiting, or abdominal pain; reports he is tolerating PO intake without difficulty and feels that his appetite has improved; no cough, shortness of breath, or chest pain  No new symptoms  Objective:  Vitals:  Temp:  [98 1 °F (36 7 °C)-98 7 °F (37 1 °C)] 98 1 °F (36 7 °C)  HR:  [] 75  Resp:  [17-22] 17  BP: (110-125)/(78-89) 125/89  SpO2:  [95 %-98 %] 98 %  Temp (24hrs), Av 4 °F (36 9 °C), Min:98 1 °F (36 7 °C), Max:98 7 °F (37 1 °C)  Current: Temperature: 98 1 °F (36 7 °C)    Physical Exam:   General Appearance:  Alert, interactive, nontoxic, no acute distress  Throat: Oropharynx moist without lesions  Lungs:   Clear to auscultation bilaterally; no wheezes, rhonchi or rales; respirations unlabored   Heart:  RRR; no murmur, rub or gallop   Abdomen:   Soft, non-tender, non-distended, positive bowel sounds  Ostomy pouch intact with soft brown stool; suprapubic catheter intact, no leakage or spreading erythema at site, urine output is light yellow without sediment     Back: Fluffed gauze packing intact to patient's left distal uttock wound, slightly saturated with serous output, no spreading erythema from site; left upper buttock with intact Allevyn foam    Extremities: No clubbing or cyanosis; no left lower extremity edema; right BKA   Skin: No new rashes, lesions, or draining wounds noted on exposed skin       Labs, Imaging, & Other studies:   All pertinent labs and imaging studies were personally reviewed  Results from last 7 days   Lab Units 19  0536 19  0405   WBC Thousand/uL 8 20 8 02 7 09   HEMOGLOBIN g/dL 8 6* 8 7* 9 1*   PLATELETS Thousands/uL 445* 456* 456*     Results from last 7 days   Lab Units 19  0618  10/29/19  0537   POTASSIUM mmol/L 3 6   < > 3 2*   CHLORIDE mmol/L 102   < > 104   CO2 mmol/L 28   < > 20*   BUN mg/dL 15   < > 14   CREATININE mg/dL 0 48*   < > 0 57* EGFR ml/min/1 73sq m 121   < > 113   CALCIUM mg/dL 8 4   < > 8 4   AST U/L  --   --  25   ALT U/L  --   --  7*   ALK PHOS U/L  --   --  63    < > = values in this interval not displayed       Results from last 7 days   Lab Units 11/01/19  5706   GRAM STAIN RESULT  No Polys or Bacteria seen

## 2019-11-04 NOTE — PROGRESS NOTES
Progress Note - Tavo Patel Moscat 1960, 63 y o  male MRN: [id number]    Unit/Bed#: [place] -01 Encounter: [id number]    Primary Care Provider: Regina Doyle MD   Date and time admitted to hospital: 10/28/2019  3:48 AM        Sepsis [facility])  Assessment & Plan  · Sepsis most likely secondary to UTI, infected penile prosthesis  · Sepsis now improving, status post penile prosthesis removal  · Last day of IV abx tomorrow  · Patient can go home tomorrow    * SBO (small bowel obstruction) ([facility] [address] )  Assessment & Plan  This has resolved  No abd pain          Subjective:   Doing well  No complaints  No abd pain  No fever  Eating well      Objective:     Vitals:   Temp (24hrs), Av 4 °F (36 9 °C), Min:98 1 °F (36 7 °C), Max:98 7 °F (37 1 °C)    Temp:  [98 1 °F (36 7 °C)-98 7 °F (37 1 °C)] 98 1 °F (36 7 °C)  HR:  [] 75  Resp:  [17-22] 17  BP: (110-125)/(78-89) 125/89  SpO2:  [95 %-98 %] 98 %  Body mass index is 23 03 kg/m²  Input and Output Summary (last 24 hours): Intake/Output Summary (Last 24 hours) at 2019 1359  Last data filed at 2019 0501  Gross per 24 hour   Intake    Output 1710 ml   Net -1710 ml       Physical Exam:     Physical Exam   HENT:   Head: Normocephalic and atraumatic  Cardiovascular: Normal rate and regular rhythm  Exam reveals no gallop and no friction rub  No murmur heard  Pulmonary/Chest: Effort normal and breath sounds normal  He has no wheezes  He has no rales  Abdominal: Soft  Bowel sounds are normal  There is no tenderness  Musculoskeletal: He exhibits no edema  Nursing note and vitals reviewed              Additional Data:     Labs:    Results from last 7 days   Lab Units 19  0618   WBC Thousand/uL 8 20   HEMOGLOBIN g/dL 8 6*   HEMATOCRIT % 29 7*   PLATELETS Thousands/uL 445*   NEUTROS PCT % 64   LYMPHS PCT % 22   MONOS PCT % 7   EOS PCT % 6     Results from last 7 days   Lab Units 19  0618  10/29/19  0537   POTASSIUM mmol/L 3 6   < > 3 2* CHLORIDE mmol/L 102   < > 104   CO2 mmol/L 28   < > 20*   BUN mg/dL 15   < > 14   CREATININE mg/dL 0 48*   < > 0 57*   CALCIUM mg/dL 8 4   < > 8 4   ALK PHOS U/L  --   --  63   ALT U/L  --   --  7*   AST U/L  --   --  25    < > = values in this interval not displayed  Results from last 7 days   Lab Units 11/01/19  1719 11/01/19  1215 11/01/19  0519   POC GLUCOSE mg/dl 73 92 87               * I Have Reviewed All Lab Data     Recent Cultures (last 7 days):     Results from last 7 days   Lab Units 11/01/19  1651   GRAM STAIN RESULT  No Polys or Bacteria seen         Last 24 Hours Medication List:     Current Facility-Administered Medications:  acetaminophen 650 mg Oral Q6H PRN [facility], PA-TIGRE    ertapenem 1,000 mg Intravenous Q24H [facility], PA-TIGRE Last Rate: 1,000 mg (11/04/19 1109)   heparin (porcine) 5,000 Units Subcutaneous Q8H [facility] Marsha Vila PA-C    HYDROmorphone 0 5 mg Intravenous Q4H PRN [facility], PA-TIGRE    morphine injection 2 mg Intravenous Q3H PRN [facility], PA-TIGRE    ondansetron 4 mg Intravenous Q6H PRN [facility], PA-TIGRE    oxyCODONE 20 mg Oral TID [facility], PA-TIGRE    pantoprazole 20 mg Oral Early Morning Marsha PHUONG Vila    vancomycin 125 mg Oral Q12H [facility]          VTE Pharmacologic Prophylaxis:   Pharmacologic: Heparin      Current Length of Stay: 7 day(s)    Current Patient Status: Inpatient       Discharge Plan: home tomorrow    Code Status: Level 1 - Full Code           Today, Patient Was Seen By: William Paget, DO    ** Please Note: Dictation voice to text software may have been used in the creation of this document   **

## 2019-11-04 NOTE — PLAN OF CARE
Problem: Potential for Falls  Goal: Patient will remain free of falls  Description  INTERVENTIONS:  - Assess patient frequently for physical needs  -  Identify cognitive and physical deficits and behaviors that affect risk of falls  -  [place] fall precautions as indicated by assessment   - Educate patient/family on patient safety including physical limitations  - Instruct patient to call for assistance with activity based on assessment  - Modify environment to reduce risk of injury  - Consider OT/PT consult to assist with strengthening/mobility  Outcome: Progressing     Problem: Prexisting or High Potential for Compromised Skin Integrity  Goal: Skin integrity is maintained or improved  Description  INTERVENTIONS:  - Identify patients at risk for skin breakdown  - Assess and monitor skin integrity  - Assess and monitor nutrition and hydration status  - Monitor labs   - Assess for incontinence   - Turn and reposition patient  - Assist with mobility/ambulation  - Relieve pressure over bony prominences  - Avoid friction and shearing  - Provide appropriate hygiene as needed including keeping skin clean and dry  - Evaluate need for skin moisturizer/barrier cream  - Collaborate with interdisciplinary team   - Patient/family teaching  - Consider wound care consult   Outcome: Progressing     Problem: Nutrition/Hydration-ADULT  Goal: Nutrient/Hydration intake appropriate for improving, restoring or maintaining nutritional needs  Description  Monitor and assess patient's nutrition/hydration status for malnutrition  Collaborate with interdisciplinary team and initiate plan and interventions as ordered  Monitor patient's weight and dietary intake as ordered or per policy  Utilize nutrition screening tool and intervene as necessary  Determine patient's food preferences and provide high-protein, high-caloric foods as appropriate       INTERVENTIONS:  - Monitor oral intake, urinary output, labs, and treatment plans  - Assess nutrition and hydration status and recommend course of action  - Evaluate amount of meals eaten  - Assist patient with eating if necessary   - Allow adequate time for meals  - Recommend/ encourage appropriate diets, oral nutritional supplements, and vitamin/mineral supplements  - Order, calculate, and assess calorie counts as needed  - Recommend, monitor, and adjust tube feedings and TPN/PPN based on assessed needs  - Assess need for intravenous fluids  - Provide specific nutrition/hydration education as appropriate  - Include patient/family/caregiver in decisions related to nutrition  Outcome: Progressing     Problem: PAIN - ADULT  Goal: Verbalizes/displays adequate comfort level or baseline comfort level  Description  Interventions:  - Encourage patient to monitor pain and request assistance  - Assess pain using appropriate pain scale  - Administer analgesics based on type and severity of pain and evaluate response  - Implement non-pharmacological measures as appropriate and evaluate response  - Consider cultural and social influences on pain and pain management  - Notify physician/advanced practitioner if interventions unsuccessful or patient reports new pain  Outcome: Progressing     Problem: INFECTION - ADULT  Goal: Absence or prevention of progression during hospitalization  Description  INTERVENTIONS:  - Assess and monitor for signs and symptoms of infection  - Monitor lab/diagnostic results  - Monitor all insertion sites, i e  indwelling lines, tubes, and drains  - Monitor endotracheal if appropriate and nasal secretions for changes in amount and color  - [place] appropriate cooling/warming therapies per order  - Administer medications as ordered  - Instruct and encourage patient and family to use good hand hygiene technique  - Identify and instruct in appropriate isolation precautions for identified infection/condition  Outcome: Progressing     Problem: SAFETY ADULT  Goal: Maintain or return to baseline ADL function  Description  INTERVENTIONS:  -  Assess patient's ability to carry out ADLs; assess patient's baseline for ADL function and identify physical deficits which impact ability to perform ADLs (bathing, care of mouth/teeth, toileting, grooming, dressing, etc )  - Assess/evaluate cause of self-care deficits   - Assess range of motion  - Assess patient's mobility; develop plan if impaired  - Assess patient's need for assistive devices and provide as appropriate  - Encourage maximum independence but intervene and supervise when necessary  - Involve family in performance of ADLs  - Assess for home care needs following discharge   - Consider OT consult to assist with ADL evaluation and planning for discharge  - Provide patient education as appropriate  Outcome: Progressing  Goal: Maintain or return mobility status to optimal level  Description  INTERVENTIONS:  - Assess patient's baseline mobility status (ambulation, transfers, stairs, etc )    - Identify cognitive and physical deficits and behaviors that affect mobility  - Identify mobility aids required to assist with transfers and/or ambulation (gait belt, sit-to-stand, lift, walker, cane, etc )  - [place] fall precautions as indicated by assessment  - Record patient progress and toleration of activity level on Mobility SBAR; progress patient to next Phase/Stage  - Instruct patient to call for assistance with activity based on assessment  - Consider rehabilitation consult to assist with strengthening/weightbearing, etc   Outcome: Progressing     Problem: DISCHARGE PLANNING  Goal: Discharge to home or other facility with appropriate resources  Description  INTERVENTIONS:  - Identify barriers to discharge w/patient and caregiver  - Arrange for needed discharge resources and transportation as appropriate  - Identify discharge learning needs (meds, wound care, etc )  - Arrange for interpretive services to assist at discharge as needed  - Refer to Case Management Department for coordinating discharge planning if the patient needs post-hospital services based on physician/advanced practitioner order or complex needs related to functional status, cognitive ability, or social support system  Outcome: Progressing     Problem: Knowledge Deficit  Goal: Patient/family/caregiver demonstrates understanding of disease process, treatment plan, medications, and discharge instructions  Description  Complete learning assessment and assess knowledge base    Interventions:  - Provide teaching at level of understanding  - Provide teaching via preferred learning methods  Outcome: Progressing     Problem: GASTROINTESTINAL - ADULT  Goal: Minimal or absence of nausea and/or vomiting  Description  INTERVENTIONS:  - Administer IV fluids if ordered to ensure adequate hydration  - Maintain NPO status until nausea and vomiting are resolved  - Nasogastric tube if ordered  - Administer ordered antiemetic medications as needed  - Provide nonpharmacologic comfort measures as appropriate  - Advance diet as tolerated, if ordered  - Consider nutrition services referral to assist patient with adequate nutrition and appropriate food choices  Outcome: Progressing  Goal: Maintains or returns to baseline bowel function  Description  INTERVENTIONS:  - Assess bowel function  - Encourage oral fluids to ensure adequate hydration  - Administer IV fluids if ordered to ensure adequate hydration  - Administer ordered medications as needed  - Encourage mobilization and activity  - Consider nutritional services referral to assist patient with adequate nutrition and appropriate food choices  Outcome: Progressing  Goal: Maintains adequate nutritional intake  Description  INTERVENTIONS:  - Monitor percentage of each meal consumed  - Identify factors contributing to decreased intake, treat as appropriate  - Assist with meals as needed  - Monitor I&O, weight, and lab values if indicated  - Obtain nutrition services referral as needed  Outcome: Progressing  Goal: Establish and maintain optimal ostomy function  Description  INTERVENTIONS:  - Assess bowel function  - Encourage oral fluids to ensure adequate hydration  - Administer IV fluids if ordered to ensure adequate hydration   - Administer ordered medications as needed  - Encourage mobilization and activity  - Nutrition services referral to assist patient with appropriate food choices  - Assess stoma site  - Consider wound care consult   Outcome: Progressing     Problem: GENITOURINARY - ADULT  Goal: Maintains or returns to baseline urinary function  Description  INTERVENTIONS:  - Assess urinary function  - Encourage oral fluids to ensure adequate hydration if ordered  - Administer IV fluids as ordered to ensure adequate hydration  - Administer ordered medications as needed  - Offer frequent toileting  - Follow urinary retention protocol if ordered  Outcome: Progressing  Goal: Absence of urinary retention  Description  INTERVENTIONS:  - Assess patients ability to void and empty bladder  - Monitor I/O  - Bladder scan as needed  - Discuss with physician/AP medications to alleviate retention as needed  - Discuss catheterization for long term situations as appropriate  Outcome: Progressing  Goal: Urinary catheter remains patent  Description  INTERVENTIONS:  - Assess patency of urinary catheter  - If patient has a chronic dangelo, consider changing catheter if non-functioning  - Follow guidelines for intermittent irrigation of non-functioning urinary catheter  Outcome: Progressing     Problem: SKIN/TISSUE INTEGRITY - ADULT  Goal: Skin integrity remains intact  Description  INTERVENTIONS  - Identify patients at risk for skin breakdown  - Assess and monitor skin integrity  - Assess and monitor nutrition and hydration status  - Monitor labs (i e  albumin)  - Assess for incontinence   - Turn and reposition patient  - Assist with mobility/ambulation  - Relieve pressure over bony prominences  - Avoid friction and shearing  - Provide appropriate hygiene as needed including keeping skin clean and dry  - Evaluate need for skin moisturizer/barrier cream  - Collaborate with interdisciplinary team (i e  Nutrition, Rehabilitation, etc )   - Patient/family teaching  Outcome: Progressing  Goal: Incision(s), wounds(s) or drain site(s) healing without S/S of infection  Description  INTERVENTIONS  - Assess and document risk factors for skin impairment   - Assess and document dressing, incision, wound bed, drain sites and surrounding tissue  - Consider nutrition services referral as needed  - Oral mucous membranes remain intact  - Provide patient/ family education  Outcome: Progressing  Goal: Oral mucous membranes remain intact  Description  INTERVENTIONS  - Assess oral mucosa and hygiene practices  - Implement preventative oral hygiene regimen  - Implement oral medicated treatments as ordered  - Initiate Nutrition services referral as needed  Outcome: Progressing     Problem: HEMATOLOGIC - ADULT  Goal: Maintains hematologic stability  Description  INTERVENTIONS  - Assess for signs and symptoms of bleeding or hemorrhage  - Monitor labs  - Administer supportive blood products/factors as ordered and appropriate  Outcome: Progressing     Problem: MUSCULOSKELETAL - ADULT  Goal: Maintain or return mobility to safest level of function  Description  INTERVENTIONS:  - Assess patient's ability to carry out ADLs; assess patient's baseline for ADL function and identify physical deficits which impact ability to perform ADLs (bathing, care of mouth/teeth, toileting, grooming, dressing, etc )  - Assess/evaluate cause of self-care deficits   - Assess range of motion  - Assess patient's mobility  - Assess patient's need for assistive devices and provide as appropriate  - Encourage maximum independence but intervene and supervise when necessary  - Involve family in performance of ADLs  - Assess for home care needs following discharge - Consider OT consult to assist with ADL evaluation and planning for discharge  - Provide patient education as appropriate  Outcome: Progressing  Goal: Maintain proper alignment of affected body part  Description  INTERVENTIONS:  - Support, maintain and protect limb and body alignment  - Provide patient/ family with appropriate education  Outcome: Progressing

## 2019-11-04 NOTE — PLAN OF CARE
Problem: Potential for Falls  Goal: Patient will remain free of falls  Description  INTERVENTIONS:  - Assess patient frequently for physical needs  -  Identify cognitive and physical deficits and behaviors that affect risk of falls  -  [place] fall precautions as indicated by assessment   - Educate patient/family on patient safety including physical limitations  - Instruct patient to call for assistance with activity based on assessment  - Modify environment to reduce risk of injury  - Consider OT/PT consult to assist with strengthening/mobility  Outcome: Progressing     Problem: Prexisting or High Potential for Compromised Skin Integrity  Goal: Skin integrity is maintained or improved  Description  INTERVENTIONS:  - Identify patients at risk for skin breakdown  - Assess and monitor skin integrity  - Assess and monitor nutrition and hydration status  - Monitor labs   - Assess for incontinence   - Turn and reposition patient  - Assist with mobility/ambulation  - Relieve pressure over bony prominences  - Avoid friction and shearing  - Provide appropriate hygiene as needed including keeping skin clean and dry  - Evaluate need for skin moisturizer/barrier cream  - Collaborate with interdisciplinary team   - Patient/family teaching  - Consider wound care consult   Outcome: Progressing     Problem: Nutrition/Hydration-ADULT  Goal: Nutrient/Hydration intake appropriate for improving, restoring or maintaining nutritional needs  Description  Monitor and assess patient's nutrition/hydration status for malnutrition  Collaborate with interdisciplinary team and initiate plan and interventions as ordered  Monitor patient's weight and dietary intake as ordered or per policy  Utilize nutrition screening tool and intervene as necessary  Determine patient's food preferences and provide high-protein, high-caloric foods as appropriate       INTERVENTIONS:  - Monitor oral intake, urinary output, labs, and treatment plans  - Assess nutrition and hydration status and recommend course of action  - Evaluate amount of meals eaten  - Assist patient with eating if necessary   - Allow adequate time for meals  - Recommend/ encourage appropriate diets, oral nutritional supplements, and vitamin/mineral supplements  - Order, calculate, and assess calorie counts as needed  - Recommend, monitor, and adjust tube feedings and TPN/PPN based on assessed needs  - Assess need for intravenous fluids  - Provide specific nutrition/hydration education as appropriate  - Include patient/family/caregiver in decisions related to nutrition  Outcome: Progressing     Problem: PAIN - ADULT  Goal: Verbalizes/displays adequate comfort level or baseline comfort level  Description  Interventions:  - Encourage patient to monitor pain and request assistance  - Assess pain using appropriate pain scale  - Administer analgesics based on type and severity of pain and evaluate response  - Implement non-pharmacological measures as appropriate and evaluate response  - Consider cultural and social influences on pain and pain management  - Notify physician/advanced practitioner if interventions unsuccessful or patient reports new pain  Outcome: Progressing     Problem: INFECTION - ADULT  Goal: Absence or prevention of progression during hospitalization  Description  INTERVENTIONS:  - Assess and monitor for signs and symptoms of infection  - Monitor lab/diagnostic results  - Monitor all insertion sites, i e  indwelling lines, tubes, and drains  - Monitor endotracheal if appropriate and nasal secretions for changes in amount and color  - [place] appropriate cooling/warming therapies per order  - Administer medications as ordered  - Instruct and encourage patient and family to use good hand hygiene technique  - Identify and instruct in appropriate isolation precautions for identified infection/condition  Outcome: Progressing     Problem: SAFETY ADULT  Goal: Maintain or return to baseline ADL function  Description  INTERVENTIONS:  -  Assess patient's ability to carry out ADLs; assess patient's baseline for ADL function and identify physical deficits which impact ability to perform ADLs (bathing, care of mouth/teeth, toileting, grooming, dressing, etc )  - Assess/evaluate cause of self-care deficits   - Assess range of motion  - Assess patient's mobility; develop plan if impaired  - Assess patient's need for assistive devices and provide as appropriate  - Encourage maximum independence but intervene and supervise when necessary  - Involve family in performance of ADLs  - Assess for home care needs following discharge   - Consider OT consult to assist with ADL evaluation and planning for discharge  - Provide patient education as appropriate  Outcome: Progressing  Goal: Maintain or return mobility status to optimal level  Description  INTERVENTIONS:  - Assess patient's baseline mobility status (ambulation, transfers, stairs, etc )    - Identify cognitive and physical deficits and behaviors that affect mobility  - Identify mobility aids required to assist with transfers and/or ambulation (gait belt, sit-to-stand, lift, walker, cane, etc )  - [place] fall precautions as indicated by assessment  - Record patient progress and toleration of activity level on Mobility SBAR; progress patient to next Phase/Stage  - Instruct patient to call for assistance with activity based on assessment  - Consider rehabilitation consult to assist with strengthening/weightbearing, etc   Outcome: Progressing     Problem: DISCHARGE PLANNING  Goal: Discharge to home or other facility with appropriate resources  Description  INTERVENTIONS:  - Identify barriers to discharge w/patient and caregiver  - Arrange for needed discharge resources and transportation as appropriate  - Identify discharge learning needs (meds, wound care, etc )  - Arrange for interpretive services to assist at discharge as needed  - Refer to Case Management Department for coordinating discharge planning if the patient needs post-hospital services based on physician/advanced practitioner order or complex needs related to functional status, cognitive ability, or social support system  Outcome: Progressing     Problem: Knowledge Deficit  Goal: Patient/family/caregiver demonstrates understanding of disease process, treatment plan, medications, and discharge instructions  Description  Complete learning assessment and assess knowledge base    Interventions:  - Provide teaching at level of understanding  - Provide teaching via preferred learning methods  Outcome: Progressing     Problem: GASTROINTESTINAL - ADULT  Goal: Minimal or absence of nausea and/or vomiting  Description  INTERVENTIONS:  - Administer IV fluids if ordered to ensure adequate hydration  - Maintain NPO status until nausea and vomiting are resolved  - Nasogastric tube if ordered  - Administer ordered antiemetic medications as needed  - Provide nonpharmacologic comfort measures as appropriate  - Advance diet as tolerated, if ordered  - Consider nutrition services referral to assist patient with adequate nutrition and appropriate food choices  Outcome: Progressing  Goal: Maintains or returns to baseline bowel function  Description  INTERVENTIONS:  - Assess bowel function  - Encourage oral fluids to ensure adequate hydration  - Administer IV fluids if ordered to ensure adequate hydration  - Administer ordered medications as needed  - Encourage mobilization and activity  - Consider nutritional services referral to assist patient with adequate nutrition and appropriate food choices  Outcome: Progressing  Goal: Maintains adequate nutritional intake  Description  INTERVENTIONS:  - Monitor percentage of each meal consumed  - Identify factors contributing to decreased intake, treat as appropriate  - Assist with meals as needed  - Monitor I&O, weight, and lab values if indicated  - Obtain nutrition services referral as needed  Outcome: Progressing  Goal: Establish and maintain optimal ostomy function  Description  INTERVENTIONS:  - Assess bowel function  - Encourage oral fluids to ensure adequate hydration  - Administer IV fluids if ordered to ensure adequate hydration   - Administer ordered medications as needed  - Encourage mobilization and activity  - Nutrition services referral to assist patient with appropriate food choices  - Assess stoma site  - Consider wound care consult   Outcome: Progressing     Problem: GENITOURINARY - ADULT  Goal: Maintains or returns to baseline urinary function  Description  INTERVENTIONS:  - Assess urinary function  - Encourage oral fluids to ensure adequate hydration if ordered  - Administer IV fluids as ordered to ensure adequate hydration  - Administer ordered medications as needed  - Offer frequent toileting  - Follow urinary retention protocol if ordered  Outcome: Progressing  Goal: Absence of urinary retention  Description  INTERVENTIONS:  - Assess patients ability to void and empty bladder  - Monitor I/O  - Bladder scan as needed  - Discuss with physician/AP medications to alleviate retention as needed  - Discuss catheterization for long term situations as appropriate  Outcome: Progressing  Goal: Urinary catheter remains patent  Description  INTERVENTIONS:  - Assess patency of urinary catheter  - If patient has a chronic dangelo, consider changing catheter if non-functioning  - Follow guidelines for intermittent irrigation of non-functioning urinary catheter  Outcome: Progressing     Problem: SKIN/TISSUE INTEGRITY - ADULT  Goal: Skin integrity remains intact  Description  INTERVENTIONS  - Identify patients at risk for skin breakdown  - Assess and monitor skin integrity  - Assess and monitor nutrition and hydration status  - Monitor labs (i e  albumin)  - Assess for incontinence   - Turn and reposition patient  - Assist with mobility/ambulation  - Relieve pressure over bony prominences  - Avoid friction and shearing  - Provide appropriate hygiene as needed including keeping skin clean and dry  - Evaluate need for skin moisturizer/barrier cream  - Collaborate with interdisciplinary team (i e  Nutrition, Rehabilitation, etc )   - Patient/family teaching  Outcome: Progressing  Goal: Incision(s), wounds(s) or drain site(s) healing without S/S of infection  Description  INTERVENTIONS  - Assess and document risk factors for skin impairment   - Assess and document dressing, incision, wound bed, drain sites and surrounding tissue  - Consider nutrition services referral as needed  - Oral mucous membranes remain intact  - Provide patient/ family education  Outcome: Progressing  Goal: Oral mucous membranes remain intact  Description  INTERVENTIONS  - Assess oral mucosa and hygiene practices  - Implement preventative oral hygiene regimen  - Implement oral medicated treatments as ordered  - Initiate Nutrition services referral as needed  Outcome: Progressing     Problem: HEMATOLOGIC - ADULT  Goal: Maintains hematologic stability  Description  INTERVENTIONS  - Assess for signs and symptoms of bleeding or hemorrhage  - Monitor labs  - Administer supportive blood products/factors as ordered and appropriate  Outcome: Progressing     Problem: MUSCULOSKELETAL - ADULT  Goal: Maintain or return mobility to safest level of function  Description  INTERVENTIONS:  - Assess patient's ability to carry out ADLs; assess patient's baseline for ADL function and identify physical deficits which impact ability to perform ADLs (bathing, care of mouth/teeth, toileting, grooming, dressing, etc )  - Assess/evaluate cause of self-care deficits   - Assess range of motion  - Assess patient's mobility  - Assess patient's need for assistive devices and provide as appropriate  - Encourage maximum independence but intervene and supervise when necessary  - Involve family in performance of ADLs  - Assess for home care needs following discharge - Consider OT consult to assist with ADL evaluation and planning for discharge  - Provide patient education as appropriate  Outcome: Progressing  Goal: Maintain proper alignment of affected body part  Description  INTERVENTIONS:  - Support, maintain and protect limb and body alignment  - Provide patient/ family with appropriate education  Outcome: Progressing

## 2019-11-05 VITALS
SYSTOLIC BLOOD PRESSURE: 134 MMHG | HEIGHT: 67 IN | TEMPERATURE: 98.1 F | BODY MASS INDEX: 23.08 KG/M2 | OXYGEN SATURATION: 98 % | HEART RATE: 67 BPM | WEIGHT: 147.05 LBS | RESPIRATION RATE: 18 BRPM | DIASTOLIC BLOOD PRESSURE: 84 MMHG

## 2019-11-05 PROCEDURE — 99239 HOSP IP/OBS DSCHRG MGMT >30: CPT | Performed by: HOSPITALIST

## 2019-11-05 PROCEDURE — 99233 SBSQ HOSP IP/OBS HIGH 50: CPT | Performed by: INTERNAL MEDICINE

## 2019-11-05 RX ORDER — OXYCODONE HYDROCHLORIDE 20 MG/1
20 TABLET ORAL 3 TIMES DAILY
Qty: 15 TABLET | Refills: 0 | Status: SHIPPED | OUTPATIENT
Start: 2019-11-05 | End: 2019-11-07 | Stop reason: SDUPTHER

## 2019-11-05 RX ADMIN — OXYCODONE HYDROCHLORIDE 20 MG: 10 TABLET ORAL at 09:16

## 2019-11-05 RX ADMIN — VANCOMYCIN HYDROCHLORIDE 125 MG: 500 INJECTION, POWDER, LYOPHILIZED, FOR SOLUTION INTRAVENOUS at 09:16

## 2019-11-05 RX ADMIN — HEPARIN SODIUM 5000 UNITS: 5000 INJECTION INTRAVENOUS; SUBCUTANEOUS at 05:14

## 2019-11-05 RX ADMIN — PANTOPRAZOLE SODIUM 20 MG: 20 TABLET, DELAYED RELEASE ORAL at 05:14

## 2019-11-05 RX ADMIN — ERTAPENEM SODIUM 1000 MG: 1 INJECTION, POWDER, LYOPHILIZED, FOR SOLUTION INTRAMUSCULAR; INTRAVENOUS at 11:22

## 2019-11-05 NOTE — ASSESSMENT & PLAN NOTE
· With eroded urethra  · Status post suprapubic catheter placement previously  · Prosthesis removed this admission by urology

## 2019-11-05 NOTE — NURSING NOTE
Pt  D/C home  D/C instructions reviewed with pt, verbalized understanding  Pt  IV removed  Pt  Sent with paper work, scripts, and all personal items  Pt  Transported home via stretcher by [facility]

## 2019-11-05 NOTE — PLAN OF CARE
Problem: Potential for Falls  Goal: Patient will remain free of falls  Description  INTERVENTIONS:  - Assess patient frequently for physical needs  -  Identify cognitive and physical deficits and behaviors that affect risk of falls  -  [place] fall precautions as indicated by assessment   - Educate patient/family on patient safety including physical limitations  - Instruct patient to call for assistance with activity based on assessment  - Modify environment to reduce risk of injury  - Consider OT/PT consult to assist with strengthening/mobility  Outcome: Progressing     Problem: Prexisting or High Potential for Compromised Skin Integrity  Goal: Skin integrity is maintained or improved  Description  INTERVENTIONS:  - Identify patients at risk for skin breakdown  - Assess and monitor skin integrity  - Assess and monitor nutrition and hydration status  - Monitor labs   - Assess for incontinence   - Turn and reposition patient  - Assist with mobility/ambulation  - Relieve pressure over bony prominences  - Avoid friction and shearing  - Provide appropriate hygiene as needed including keeping skin clean and dry  - Evaluate need for skin moisturizer/barrier cream  - Collaborate with interdisciplinary team   - Patient/family teaching  - Consider wound care consult   Outcome: Progressing     Problem: Nutrition/Hydration-ADULT  Goal: Nutrient/Hydration intake appropriate for improving, restoring or maintaining nutritional needs  Description  Monitor and assess patient's nutrition/hydration status for malnutrition  Collaborate with interdisciplinary team and initiate plan and interventions as ordered  Monitor patient's weight and dietary intake as ordered or per policy  Utilize nutrition screening tool and intervene as necessary  Determine patient's food preferences and provide high-protein, high-caloric foods as appropriate       INTERVENTIONS:  - Monitor oral intake, urinary output, labs, and treatment plans  - Assess nutrition and hydration status and recommend course of action  - Evaluate amount of meals eaten  - Assist patient with eating if necessary   - Allow adequate time for meals  - Recommend/ encourage appropriate diets, oral nutritional supplements, and vitamin/mineral supplements  - Order, calculate, and assess calorie counts as needed  - Recommend, monitor, and adjust tube feedings and TPN/PPN based on assessed needs  - Assess need for intravenous fluids  - Provide specific nutrition/hydration education as appropriate  - Include patient/family/caregiver in decisions related to nutrition  Outcome: Progressing     Problem: PAIN - ADULT  Goal: Verbalizes/displays adequate comfort level or baseline comfort level  Description  Interventions:  - Encourage patient to monitor pain and request assistance  - Assess pain using appropriate pain scale  - Administer analgesics based on type and severity of pain and evaluate response  - Implement non-pharmacological measures as appropriate and evaluate response  - Consider cultural and social influences on pain and pain management  - Notify physician/advanced practitioner if interventions unsuccessful or patient reports new pain  Outcome: Progressing     Problem: INFECTION - ADULT  Goal: Absence or prevention of progression during hospitalization  Description  INTERVENTIONS:  - Assess and monitor for signs and symptoms of infection  - Monitor lab/diagnostic results  - Monitor all insertion sites, i e  indwelling lines, tubes, and drains  - Monitor endotracheal if appropriate and nasal secretions for changes in amount and color  - [place] appropriate cooling/warming therapies per order  - Administer medications as ordered  - Instruct and encourage patient and family to use good hand hygiene technique  - Identify and instruct in appropriate isolation precautions for identified infection/condition  Outcome: Progressing     Problem: SAFETY ADULT  Goal: Maintain or return to baseline ADL function  Description  INTERVENTIONS:  -  Assess patient's ability to carry out ADLs; assess patient's baseline for ADL function and identify physical deficits which impact ability to perform ADLs (bathing, care of mouth/teeth, toileting, grooming, dressing, etc )  - Assess/evaluate cause of self-care deficits   - Assess range of motion  - Assess patient's mobility; develop plan if impaired  - Assess patient's need for assistive devices and provide as appropriate  - Encourage maximum independence but intervene and supervise when necessary  - Involve family in performance of ADLs  - Assess for home care needs following discharge   - Consider OT consult to assist with ADL evaluation and planning for discharge  - Provide patient education as appropriate  Outcome: Progressing  Goal: Maintain or return mobility status to optimal level  Description  INTERVENTIONS:  - Assess patient's baseline mobility status (ambulation, transfers, stairs, etc )    - Identify cognitive and physical deficits and behaviors that affect mobility  - Identify mobility aids required to assist with transfers and/or ambulation (gait belt, sit-to-stand, lift, walker, cane, etc )  - [place] fall precautions as indicated by assessment  - Record patient progress and toleration of activity level on Mobility SBAR; progress patient to next Phase/Stage  - Instruct patient to call for assistance with activity based on assessment  - Consider rehabilitation consult to assist with strengthening/weightbearing, etc   Outcome: Progressing     Problem: DISCHARGE PLANNING  Goal: Discharge to home or other facility with appropriate resources  Description  INTERVENTIONS:  - Identify barriers to discharge w/patient and caregiver  - Arrange for needed discharge resources and transportation as appropriate  - Identify discharge learning needs (meds, wound care, etc )  - Arrange for interpretive services to assist at discharge as needed  - Refer to Case Management Department for coordinating discharge planning if the patient needs post-hospital services based on physician/advanced practitioner order or complex needs related to functional status, cognitive ability, or social support system  Outcome: Progressing     Problem: Knowledge Deficit  Goal: Patient/family/caregiver demonstrates understanding of disease process, treatment plan, medications, and discharge instructions  Description  Complete learning assessment and assess knowledge base    Interventions:  - Provide teaching at level of understanding  - Provide teaching via preferred learning methods  Outcome: Progressing     Problem: GASTROINTESTINAL - ADULT  Goal: Minimal or absence of nausea and/or vomiting  Description  INTERVENTIONS:  - Administer IV fluids if ordered to ensure adequate hydration  - Maintain NPO status until nausea and vomiting are resolved  - Nasogastric tube if ordered  - Administer ordered antiemetic medications as needed  - Provide nonpharmacologic comfort measures as appropriate  - Advance diet as tolerated, if ordered  - Consider nutrition services referral to assist patient with adequate nutrition and appropriate food choices  Outcome: Progressing  Goal: Maintains or returns to baseline bowel function  Description  INTERVENTIONS:  - Assess bowel function  - Encourage oral fluids to ensure adequate hydration  - Administer IV fluids if ordered to ensure adequate hydration  - Administer ordered medications as needed  - Encourage mobilization and activity  - Consider nutritional services referral to assist patient with adequate nutrition and appropriate food choices  Outcome: Progressing  Goal: Maintains adequate nutritional intake  Description  INTERVENTIONS:  - Monitor percentage of each meal consumed  - Identify factors contributing to decreased intake, treat as appropriate  - Assist with meals as needed  - Monitor I&O, weight, and lab values if indicated  - Obtain nutrition services referral as needed  Outcome: Progressing  Goal: Establish and maintain optimal ostomy function  Description  INTERVENTIONS:  - Assess bowel function  - Encourage oral fluids to ensure adequate hydration  - Administer IV fluids if ordered to ensure adequate hydration   - Administer ordered medications as needed  - Encourage mobilization and activity  - Nutrition services referral to assist patient with appropriate food choices  - Assess stoma site  - Consider wound care consult   Outcome: Progressing     Problem: GENITOURINARY - ADULT  Goal: Maintains or returns to baseline urinary function  Description  INTERVENTIONS:  - Assess urinary function  - Encourage oral fluids to ensure adequate hydration if ordered  - Administer IV fluids as ordered to ensure adequate hydration  - Administer ordered medications as needed  - Offer frequent toileting  - Follow urinary retention protocol if ordered  Outcome: Progressing  Goal: Absence of urinary retention  Description  INTERVENTIONS:  - Assess patients ability to void and empty bladder  - Monitor I/O  - Bladder scan as needed  - Discuss with physician/AP medications to alleviate retention as needed  - Discuss catheterization for long term situations as appropriate  Outcome: Progressing  Goal: Urinary catheter remains patent  Description  INTERVENTIONS:  - Assess patency of urinary catheter  - If patient has a chronic dangelo, consider changing catheter if non-functioning  - Follow guidelines for intermittent irrigation of non-functioning urinary catheter  Outcome: Progressing     Problem: SKIN/TISSUE INTEGRITY - ADULT  Goal: Skin integrity remains intact  Description  INTERVENTIONS  - Identify patients at risk for skin breakdown  - Assess and monitor skin integrity  - Assess and monitor nutrition and hydration status  - Monitor labs (i e  albumin)  - Assess for incontinence   - Turn and reposition patient  - Assist with mobility/ambulation  - Relieve pressure over bony prominences  - Avoid friction and shearing  - Provide appropriate hygiene as needed including keeping skin clean and dry  - Evaluate need for skin moisturizer/barrier cream  - Collaborate with interdisciplinary team (i e  Nutrition, Rehabilitation, etc )   - Patient/family teaching  Outcome: Progressing  Goal: Incision(s), wounds(s) or drain site(s) healing without S/S of infection  Description  INTERVENTIONS  - Assess and document risk factors for skin impairment   - Assess and document dressing, incision, wound bed, drain sites and surrounding tissue  - Consider nutrition services referral as needed  - Oral mucous membranes remain intact  - Provide patient/ family education  Outcome: Progressing  Goal: Oral mucous membranes remain intact  Description  INTERVENTIONS  - Assess oral mucosa and hygiene practices  - Implement preventative oral hygiene regimen  - Implement oral medicated treatments as ordered  - Initiate Nutrition services referral as needed  Outcome: Progressing     Problem: HEMATOLOGIC - ADULT  Goal: Maintains hematologic stability  Description  INTERVENTIONS  - Assess for signs and symptoms of bleeding or hemorrhage  - Monitor labs  - Administer supportive blood products/factors as ordered and appropriate  Outcome: Progressing     Problem: MUSCULOSKELETAL - ADULT  Goal: Maintain or return mobility to safest level of function  Description  INTERVENTIONS:  - Assess patient's ability to carry out ADLs; assess patient's baseline for ADL function and identify physical deficits which impact ability to perform ADLs (bathing, care of mouth/teeth, toileting, grooming, dressing, etc )  - Assess/evaluate cause of self-care deficits   - Assess range of motion  - Assess patient's mobility  - Assess patient's need for assistive devices and provide as appropriate  - Encourage maximum independence but intervene and supervise when necessary  - Involve family in performance of ADLs  - Assess for home care needs following discharge - Consider OT consult to assist with ADL evaluation and planning for discharge  - Provide patient education as appropriate  Outcome: Progressing  Goal: Maintain proper alignment of affected body part  Description  INTERVENTIONS:  - Support, maintain and protect limb and body alignment  - Provide patient/ family with appropriate education  Outcome: Progressing

## 2019-11-05 NOTE — PROGRESS NOTES
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  He is now status post removal of the penile prosthesis  Also consider secondary to ileus versus small-bowel obstruction although this is an unlikely source of fever   Blood cultures were negative after 5 days   Fortunately the patient remains clinically stable and nontoxic   His fever curve has trended down   WBC count has normalized   The patient will complete his course of IV Ertapenem today   -complete IV Ertapenem today, 11/05/2019, for total of seven days of appropriate antibiotic treatment  -monitor CBC and BMP  -monitor vitals  -supportive care     2  Possible ESBL E coli UTI  UA collected from suprapubic tube did appear abnormal  He unfortunately has a 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, MRSA, and e coli ESBL  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  New concern with further erosion as his penile prosthesis was exposed in the wound base  It is now removed  Unclear if urinary bacteria were playing a part in this erosion    -antibiotic as above  -monitor CBC and BMP  -monitor vitals  -serial suprapubic tube examination and care  -monitor urine output     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 implant noted in left buttock decubitus ulcer  Urology has now removed the implant as well as the reservoir   -continue close follow-up with Urology     4  Small bowel obstruction verses ileus   CT of the abdomen and pelvis was concerning for dilation of his proximal and mid jejunum consistent with a bowel obstruction  Two small fluid collections were also noted within the right pelvis which were new when compared to previous pelvic imaging   He he reported no output from his ostomy since 10/26/2019 but began seeing stool output again on 10/29/2019  Today his abdominal exam is benign and he continues without acute stool output  He is tolerating PO intake without difficulty  [facility] continues to follow closely with General surgery   -serial abdominal exams  -serial ostomy examination and care  -monitor GI symptoms  -monitor stool output  -continue close follow up with general surgery     5  Chronic left buttock decubitus ulceration   POA   With chronic underlying osteomyelitis  And new finding of exposed penile prosthesis in the wound base  Prosthesis has been removed  Wound has had no purulent drainage and has not appeared cellulitic on exam  Wound management is following and have made wound care recommendations   -serial wound exams  -local wound care per wound management team  -continue follow up with the wound management team     6  History of C diff   Patient currently without output in his ostomy   Concern for small bowel obstruction versus ileus  He will still require PO vancomycin prophylaxis as he receives above antibiotics and for 72 hours after their completion   -continue PO vancomycin prophylaxis through 11/08/2019 which is 72 hours after completion of antibiotic above  -monitor stool output  -monitor abdominal symptoms     7  History of resistant organisms including ESBL E coli, MRSA, and Pseudomonas      8  Paraplegia  Patient is stable to discharge from ID standpoint after his final dose of IV Ertapenem at 11:30  Above plan was discussed in detail with patient at the bedside  Antibiotics:  Ertapenem 7  PO vancomycin 9  Antibiotics 9  Postop 4    Subjective:  Patient reports he is feeling well today  Is very eager to go home    Expresses his thinks to his entire medical team and feels he has received excellent care during his hospitalization  He has no acute complaints  He has no concerns with his buttock wound dressings  He denies fever, chills, sweats, shakes; no nausea, vomiting, abdominal pain; reports good output of soft brown stool in his ostomy pouch; no cough, shortness of breath, or chest pain  No new symptoms  Objective:  Vitals:  Temp:  [98 1 °F (36 7 °C)-98 6 °F (37 °C)] 98 1 °F (36 7 °C)  HR:  [67-99] 67  Resp:  [18] 18  BP: (111-134)/(77-84) 134/84  SpO2:  [95 %-98 %] 98 %  Temp (24hrs), Av 4 °F (36 9 °C), Min:98 1 °F (36 7 °C), Max:98 6 °F (37 °C)  Current: Temperature: 98 1 °F (36 7 °C)    Physical Exam:   General Appearance:  Alert, interactive, nontoxic, no acute distress  Throat: Oropharynx moist without lesions  Lungs:   Clear to auscultation bilaterally; no wheezes, rhonchi or rales; respirations unlabored   Heart:  RRR; no murmur, rub or gallop   Abdomen:   Soft, non-tender, non-distended, positive bowel sounds  Ostomy intact with soft brown stool; suprapubic catheter intact, no leakage or spreading erythema from site, urine output is light yellow without sediment    Back: Left distal buttock wound packed with fluffed gauze, no spreading erythema from site, small amount of serous drainage on packing; left medial buttock wound covered with Allevyn foam, no spreading erythema from site, no leakage outer bandage   Extremities: No clubbing or cyanosis; right BKA; left lower extremity without edema or erythema   Skin: No new rashes, lesions, or draining wounds noted on exposed skin       Labs, Imaging, & Other studies:   All pertinent labs and imaging studies were personally reviewed  Results from last 7 days   Lab Units 19  0618 19  0536 19  0405   WBC Thousand/uL 8 20 8 02 7 09   HEMOGLOBIN g/dL 8 6* 8 7* 9 1*   PLATELETS Thousands/uL 445* 456* 456*     Results from last 7 days   Lab Units 19  0618   POTASSIUM mmol/L 3 6   CHLORIDE mmol/L 102 CO2 mmol/L 28   BUN mg/dL 15   CREATININE mg/dL 0 48*   EGFR ml/min/1 73sq m 121   CALCIUM mg/dL 8 4     Results from last 7 days   Lab Units 11/01/19  1651   GRAM STAIN RESULT  No Polys or Bacteria seen

## 2019-11-05 NOTE — PLAN OF CARE
Problem: Potential for Falls  Goal: Patient will remain free of falls  Description  INTERVENTIONS:  - Assess patient frequently for physical needs  -  Identify cognitive and physical deficits and behaviors that affect risk of falls  -  [place] fall precautions as indicated by assessment   - Educate patient/family on patient safety including physical limitations  - Instruct patient to call for assistance with activity based on assessment  - Modify environment to reduce risk of injury  - Consider OT/PT consult to assist with strengthening/mobility  11/5/2019 1251 by Deanna Steve RN  Outcome: Adequate for Discharge  11/5/2019 0735 by Deanna Steve RN  Outcome: Progressing     Problem: Prexisting or High Potential for Compromised Skin Integrity  Goal: Skin integrity is maintained or improved  Description  INTERVENTIONS:  - Identify patients at risk for skin breakdown  - Assess and monitor skin integrity  - Assess and monitor nutrition and hydration status  - Monitor labs   - Assess for incontinence   - Turn and reposition patient  - Assist with mobility/ambulation  - Relieve pressure over bony prominences  - Avoid friction and shearing  - Provide appropriate hygiene as needed including keeping skin clean and dry  - Evaluate need for skin moisturizer/barrier cream  - Collaborate with interdisciplinary team   - Patient/family teaching  - Consider wound care consult   11/5/2019 1251 by Deanna Steve RN  Outcome: Adequate for Discharge  11/5/2019 0735 by Deanna Steve RN  Outcome: Progressing     Problem: Nutrition/Hydration-ADULT  Goal: Nutrient/Hydration intake appropriate for improving, restoring or maintaining nutritional needs  Description  Monitor and assess patient's nutrition/hydration status for malnutrition  Collaborate with interdisciplinary team and initiate plan and interventions as ordered  Monitor patient's weight and dietary intake as ordered or per policy   Utilize nutrition screening tool and intervene as necessary  Determine patient's food preferences and provide high-protein, high-caloric foods as appropriate       INTERVENTIONS:  - Monitor oral intake, urinary output, labs, and treatment plans  - Assess nutrition and hydration status and recommend course of action  - Evaluate amount of meals eaten  - Assist patient with eating if necessary   - Allow adequate time for meals  - Recommend/ encourage appropriate diets, oral nutritional supplements, and vitamin/mineral supplements  - Order, calculate, and assess calorie counts as needed  - Recommend, monitor, and adjust tube feedings and TPN/PPN based on assessed needs  - Assess need for intravenous fluids  - Provide specific nutrition/hydration education as appropriate  - Include patient/family/caregiver in decisions related to nutrition  11/5/2019 1251 by Kristal Huggins RN  Outcome: Adequate for Discharge  11/5/2019 0735 by Kristal Huggins RN  Outcome: Progressing     Problem: PAIN - ADULT  Goal: Verbalizes/displays adequate comfort level or baseline comfort level  Description  Interventions:  - Encourage patient to monitor pain and request assistance  - Assess pain using appropriate pain scale  - Administer analgesics based on type and severity of pain and evaluate response  - Implement non-pharmacological measures as appropriate and evaluate response  - Consider cultural and social influences on pain and pain management  - Notify physician/advanced practitioner if interventions unsuccessful or patient reports new pain  11/5/2019 1251 by Kristal Huggins RN  Outcome: Adequate for Discharge  11/5/2019 0735 by Kristal Huggins RN  Outcome: Progressing     Problem: INFECTION - ADULT  Goal: Absence or prevention of progression during hospitalization  Description  INTERVENTIONS:  - Assess and monitor for signs and symptoms of infection  - Monitor lab/diagnostic results  - Monitor all insertion sites, i e  indwelling lines, tubes, and drains  - Monitor endotracheal if appropriate and nasal secretions for changes in amount and color  - [place] appropriate cooling/warming therapies per order  - Administer medications as ordered  - Instruct and encourage patient and family to use good hand hygiene technique  - Identify and instruct in appropriate isolation precautions for identified infection/condition  11/5/2019 1251 by Rocky Connor RN  Outcome: Adequate for Discharge  11/5/2019 0735 by Rocky Connor RN  Outcome: Progressing     Problem: SAFETY ADULT  Goal: Maintain or return to baseline ADL function  Description  INTERVENTIONS:  -  Assess patient's ability to carry out ADLs; assess patient's baseline for ADL function and identify physical deficits which impact ability to perform ADLs (bathing, care of mouth/teeth, toileting, grooming, dressing, etc )  - Assess/evaluate cause of self-care deficits   - Assess range of motion  - Assess patient's mobility; develop plan if impaired  - Assess patient's need for assistive devices and provide as appropriate  - Encourage maximum independence but intervene and supervise when necessary  - Involve family in performance of ADLs  - Assess for home care needs following discharge   - Consider OT consult to assist with ADL evaluation and planning for discharge  - Provide patient education as appropriate  11/5/2019 1251 by Rocky Connor RN  Outcome: Adequate for Discharge  11/5/2019 0735 by Rocky Connor RN  Outcome: Progressing  Goal: Maintain or return mobility status to optimal level  Description  INTERVENTIONS:  - Assess patient's baseline mobility status (ambulation, transfers, stairs, etc )    - Identify cognitive and physical deficits and behaviors that affect mobility  - Identify mobility aids required to assist with transfers and/or ambulation (gait belt, sit-to-stand, lift, walker, cane, etc )  - [place] fall precautions as indicated by assessment  - Record patient progress and toleration of activity level on Mobility SBAR; progress patient to next Phase/Stage  - Instruct patient to call for assistance with activity based on assessment  - Consider rehabilitation consult to assist with strengthening/weightbearing, etc   11/5/2019 1251 by Maxwell Blackman RN  Outcome: Adequate for Discharge  11/5/2019 0735 by Maxwell Blackman RN  Outcome: Progressing     Problem: DISCHARGE PLANNING  Goal: Discharge to home or other facility with appropriate resources  Description  INTERVENTIONS:  - Identify barriers to discharge w/patient and caregiver  - Arrange for needed discharge resources and transportation as appropriate  - Identify discharge learning needs (meds, wound care, etc )  - Arrange for interpretive services to assist at discharge as needed  - Refer to Case Management Department for coordinating discharge planning if the patient needs post-hospital services based on physician/advanced practitioner order or complex needs related to functional status, cognitive ability, or social support system  11/5/2019 1251 by Maxwell Blackman RN  Outcome: Adequate for Discharge  11/5/2019 0735 by Maxwell Blackman RN  Outcome: Progressing     Problem: Knowledge Deficit  Goal: Patient/family/caregiver demonstrates understanding of disease process, treatment plan, medications, and discharge instructions  Description  Complete learning assessment and assess knowledge base    Interventions:  - Provide teaching at level of understanding  - Provide teaching via preferred learning methods  11/5/2019 1251 by Maxwell Blackman RN  Outcome: Adequate for Discharge  11/5/2019 0735 by Maxwell Blackman RN  Outcome: Progressing     Problem: GASTROINTESTINAL - ADULT  Goal: Minimal or absence of nausea and/or vomiting  Description  INTERVENTIONS:  - Administer IV fluids if ordered to ensure adequate hydration  - Maintain NPO status until nausea and vomiting are resolved  - Nasogastric tube if ordered  - Administer ordered antiemetic medications as needed  - Provide nonpharmacologic comfort measures as appropriate  - Advance diet as tolerated, if ordered  - Consider nutrition services referral to assist patient with adequate nutrition and appropriate food choices  11/5/2019 1251 by Nohemy Hope RN  Outcome: Adequate for Discharge  11/5/2019 0735 by Nohemy Hope RN  Outcome: Progressing  Goal: Maintains or returns to baseline bowel function  Description  INTERVENTIONS:  - Assess bowel function  - Encourage oral fluids to ensure adequate hydration  - Administer IV fluids if ordered to ensure adequate hydration  - Administer ordered medications as needed  - Encourage mobilization and activity  - Consider nutritional services referral to assist patient with adequate nutrition and appropriate food choices  11/5/2019 1251 by Nohemy Hope RN  Outcome: Adequate for Discharge  11/5/2019 0735 by Nohemy Hope RN  Outcome: Progressing  Goal: Maintains adequate nutritional intake  Description  INTERVENTIONS:  - Monitor percentage of each meal consumed  - Identify factors contributing to decreased intake, treat as appropriate  - Assist with meals as needed  - Monitor I&O, weight, and lab values if indicated  - Obtain nutrition services referral as needed  11/5/2019 1251 by Nohemy Hope RN  Outcome: Adequate for Discharge  11/5/2019 0735 by Nohemy Hope RN  Outcome: Progressing  Goal: Establish and maintain optimal ostomy function  Description  INTERVENTIONS:  - Assess bowel function  - Encourage oral fluids to ensure adequate hydration  - Administer IV fluids if ordered to ensure adequate hydration   - Administer ordered medications as needed  - Encourage mobilization and activity  - Nutrition services referral to assist patient with appropriate food choices  - Assess stoma site  - Consider wound care consult   11/5/2019 1251 by Nohemy Hope RN  Outcome: Adequate for Discharge  11/5/2019 0735 by Nohemy Hope RN  Outcome: Progressing     Problem: GENITOURINARY - ADULT  Goal: Maintains or returns to baseline urinary function  Description  INTERVENTIONS:  - Assess urinary function  - Encourage oral fluids to ensure adequate hydration if ordered  - Administer IV fluids as ordered to ensure adequate hydration  - Administer ordered medications as needed  - Offer frequent toileting  - Follow urinary retention protocol if ordered  11/5/2019 1251 by Misael Barber RN  Outcome: Adequate for Discharge  11/5/2019 0735 by Misael Barber RN  Outcome: Progressing  Goal: Absence of urinary retention  Description  INTERVENTIONS:  - Assess patients ability to void and empty bladder  - Monitor I/O  - Bladder scan as needed  - Discuss with physician/AP medications to alleviate retention as needed  - Discuss catheterization for long term situations as appropriate  11/5/2019 1251 by Misael Barber RN  Outcome: Adequate for Discharge  11/5/2019 0735 by Misael Barber RN  Outcome: Progressing  Goal: Urinary catheter remains patent  Description  INTERVENTIONS:  - Assess patency of urinary catheter  - If patient has a chronic dangelo, consider changing catheter if non-functioning  - Follow guidelines for intermittent irrigation of non-functioning urinary catheter  11/5/2019 1251 by Misael Barber RN  Outcome: Adequate for Discharge  11/5/2019 0735 by Misael Barber RN  Outcome: Progressing     Problem: SKIN/TISSUE INTEGRITY - ADULT  Goal: Skin integrity remains intact  Description  INTERVENTIONS  - Identify patients at risk for skin breakdown  - Assess and monitor skin integrity  - Assess and monitor nutrition and hydration status  - Monitor labs (i e  albumin)  - Assess for incontinence   - Turn and reposition patient  - Assist with mobility/ambulation  - Relieve pressure over bony prominences  - Avoid friction and shearing  - Provide appropriate hygiene as needed including keeping skin clean and dry  - Evaluate need for skin moisturizer/barrier cream  - Collaborate with interdisciplinary team (i e  Nutrition, Rehabilitation, etc )   - Patient/family teaching  11/5/2019 1251 by Tarah Burger RN  Outcome: Adequate for Discharge  11/5/2019 0735 by Tarah Burger RN  Outcome: Progressing  Goal: Incision(s), wounds(s) or drain site(s) healing without S/S of infection  Description  INTERVENTIONS  - Assess and document risk factors for skin impairment   - Assess and document dressing, incision, wound bed, drain sites and surrounding tissue  - Consider nutrition services referral as needed  - Oral mucous membranes remain intact  - Provide patient/ family education  11/5/2019 1251 by Tarah Burger RN  Outcome: Adequate for Discharge  11/5/2019 0735 by Tarah Burger RN  Outcome: Progressing  Goal: Oral mucous membranes remain intact  Description  INTERVENTIONS  - Assess oral mucosa and hygiene practices  - Implement preventative oral hygiene regimen  - Implement oral medicated treatments as ordered  - Initiate Nutrition services referral as needed  11/5/2019 1251 by Tarah Burger RN  Outcome: Adequate for Discharge  11/5/2019 0735 by Tarah Burger RN  Outcome: Progressing     Problem: HEMATOLOGIC - ADULT  Goal: Maintains hematologic stability  Description  INTERVENTIONS  - Assess for signs and symptoms of bleeding or hemorrhage  - Monitor labs  - Administer supportive blood products/factors as ordered and appropriate  11/5/2019 1251 by Tarah Burger RN  Outcome: Adequate for Discharge  11/5/2019 0735 by Tarah Burger RN  Outcome: Progressing     Problem: MUSCULOSKELETAL - ADULT  Goal: Maintain or return mobility to safest level of function  Description  INTERVENTIONS:  - Assess patient's ability to carry out ADLs; assess patient's baseline for ADL function and identify physical deficits which impact ability to perform ADLs (bathing, care of mouth/teeth, toileting, grooming, dressing, etc )  - Assess/evaluate cause of self-care deficits   - Assess range of motion  - Assess patient's mobility  - Assess patient's need for assistive devices and provide as appropriate  - Encourage maximum independence but intervene and supervise when necessary  - Involve family in performance of ADLs  - Assess for home care needs following discharge   - Consider OT consult to assist with ADL evaluation and planning for discharge  - Provide patient education as appropriate  11/5/2019 1251 by Nikole Jaramillo RN  Outcome: Adequate for Discharge  11/5/2019 0735 by Nikole Jaramillo RN  Outcome: Progressing  Goal: Maintain proper alignment of affected body part  Description  INTERVENTIONS:  - Support, maintain and protect limb and body alignment  - Provide patient/ family with appropriate education  11/5/2019 1251 by Nikole Jaramillo RN  Outcome: Adequate for Discharge  11/5/2019 0735 by Nikole Jaramillo RN  Outcome: Progressing

## 2019-11-05 NOTE — PLAN OF CARE
Problem: Potential for Falls  Goal: Patient will remain free of falls  Description  INTERVENTIONS:  - Assess patient frequently for physical needs  -  Identify cognitive and physical deficits and behaviors that affect risk of falls  -  [place] fall precautions as indicated by assessment   - Educate patient/family on patient safety including physical limitations  - Instruct patient to call for assistance with activity based on assessment  - Modify environment to reduce risk of injury  - Consider OT/PT consult to assist with strengthening/mobility  11/5/2019 1352 by Priscilla Simon RN  Outcome: Completed  11/5/2019 1251 by Priscilla Simon RN  Outcome: Adequate for Discharge  11/5/2019 0735 by Priscilla Simon RN  Outcome: Progressing     Problem: Prexisting or High Potential for Compromised Skin Integrity  Goal: Skin integrity is maintained or improved  Description  INTERVENTIONS:  - Identify patients at risk for skin breakdown  - Assess and monitor skin integrity  - Assess and monitor nutrition and hydration status  - Monitor labs   - Assess for incontinence   - Turn and reposition patient  - Assist with mobility/ambulation  - Relieve pressure over bony prominences  - Avoid friction and shearing  - Provide appropriate hygiene as needed including keeping skin clean and dry  - Evaluate need for skin moisturizer/barrier cream  - Collaborate with interdisciplinary team   - Patient/family teaching  - Consider wound care consult   11/5/2019 1352 by Priscilla Simon RN  Outcome: Completed  11/5/2019 1251 by Priscilla Simon RN  Outcome: Adequate for Discharge  11/5/2019 0735 by Priscilla Simon RN  Outcome: Progressing     Problem: Nutrition/Hydration-ADULT  Goal: Nutrient/Hydration intake appropriate for improving, restoring or maintaining nutritional needs  Description  Monitor and assess patient's nutrition/hydration status for malnutrition   Collaborate with interdisciplinary team and initiate plan and interventions as ordered  Monitor patient's weight and dietary intake as ordered or per policy  Utilize nutrition screening tool and intervene as necessary  Determine patient's food preferences and provide high-protein, high-caloric foods as appropriate       INTERVENTIONS:  - Monitor oral intake, urinary output, labs, and treatment plans  - Assess nutrition and hydration status and recommend course of action  - Evaluate amount of meals eaten  - Assist patient with eating if necessary   - Allow adequate time for meals  - Recommend/ encourage appropriate diets, oral nutritional supplements, and vitamin/mineral supplements  - Order, calculate, and assess calorie counts as needed  - Recommend, monitor, and adjust tube feedings and TPN/PPN based on assessed needs  - Assess need for intravenous fluids  - Provide specific nutrition/hydration education as appropriate  - Include patient/family/caregiver in decisions related to nutrition  11/5/2019 1352 by Ann Almanzar RN  Outcome: Completed  11/5/2019 1251 by Ann Almanzar RN  Outcome: Adequate for Discharge  11/5/2019 0735 by Ann Almanzar RN  Outcome: Progressing     Problem: PAIN - ADULT  Goal: Verbalizes/displays adequate comfort level or baseline comfort level  Description  Interventions:  - Encourage patient to monitor pain and request assistance  - Assess pain using appropriate pain scale  - Administer analgesics based on type and severity of pain and evaluate response  - Implement non-pharmacological measures as appropriate and evaluate response  - Consider cultural and social influences on pain and pain management  - Notify physician/advanced practitioner if interventions unsuccessful or patient reports new pain  11/5/2019 1352 by Ann Almanzar RN  Outcome: Completed  11/5/2019 1251 by Ann Almanzar RN  Outcome: Adequate for Discharge  11/5/2019 0735 by Ann Almanzar RN  Outcome: Progressing     Problem: INFECTION - ADULT  Goal: Absence or prevention of progression during hospitalization  Description  INTERVENTIONS:  - Assess and monitor for signs and symptoms of infection  - Monitor lab/diagnostic results  - Monitor all insertion sites, i e  indwelling lines, tubes, and drains  - Monitor endotracheal if appropriate and nasal secretions for changes in amount and color  - [place] appropriate cooling/warming therapies per order  - Administer medications as ordered  - Instruct and encourage patient and family to use good hand hygiene technique  - Identify and instruct in appropriate isolation precautions for identified infection/condition  11/5/2019 1352 by Primitivo Edgar RN  Outcome: Completed  11/5/2019 1251 by Primitivo Edgar RN  Outcome: Adequate for Discharge  11/5/2019 0735 by Primitivo Edgar RN  Outcome: Progressing     Problem: SAFETY ADULT  Goal: Maintain or return to baseline ADL function  Description  INTERVENTIONS:  -  Assess patient's ability to carry out ADLs; assess patient's baseline for ADL function and identify physical deficits which impact ability to perform ADLs (bathing, care of mouth/teeth, toileting, grooming, dressing, etc )  - Assess/evaluate cause of self-care deficits   - Assess range of motion  - Assess patient's mobility; develop plan if impaired  - Assess patient's need for assistive devices and provide as appropriate  - Encourage maximum independence but intervene and supervise when necessary  - Involve family in performance of ADLs  - Assess for home care needs following discharge   - Consider OT consult to assist with ADL evaluation and planning for discharge  - Provide patient education as appropriate  11/5/2019 1352 by Primitivo Edgar RN  Outcome: Completed  11/5/2019 1251 by Primitivo Edgar RN  Outcome: Adequate for Discharge  11/5/2019 0735 by Primitivo Edgar RN  Outcome: Progressing  Goal: Maintain or return mobility status to optimal level  Description  INTERVENTIONS:  - Assess patient's baseline mobility status (ambulation, transfers, stairs, etc )    - Identify cognitive and physical deficits and behaviors that affect mobility  - Identify mobility aids required to assist with transfers and/or ambulation (gait belt, sit-to-stand, lift, walker, cane, etc )  - [place] fall precautions as indicated by assessment  - Record patient progress and toleration of activity level on Mobility SBAR; progress patient to next Phase/Stage  - Instruct patient to call for assistance with activity based on assessment  - Consider rehabilitation consult to assist with strengthening/weightbearing, etc   11/5/2019 1352 by Susana Smallwood RN  Outcome: Completed  11/5/2019 1251 by Susana Smallwood RN  Outcome: Adequate for Discharge  11/5/2019 0735 by Susana Smallwood RN  Outcome: Progressing     Problem: DISCHARGE PLANNING  Goal: Discharge to home or other facility with appropriate resources  Description  INTERVENTIONS:  - Identify barriers to discharge w/patient and caregiver  - Arrange for needed discharge resources and transportation as appropriate  - Identify discharge learning needs (meds, wound care, etc )  - Arrange for interpretive services to assist at discharge as needed  - Refer to Case Management Department for coordinating discharge planning if the patient needs post-hospital services based on physician/advanced practitioner order or complex needs related to functional status, cognitive ability, or social support system  11/5/2019 1352 by Susana Smallwood RN  Outcome: Completed  11/5/2019 1251 by Susana Smallwood RN  Outcome: Adequate for Discharge  11/5/2019 0735 by Susana Smallwood RN  Outcome: Progressing     Problem: Knowledge Deficit  Goal: Patient/family/caregiver demonstrates understanding of disease process, treatment plan, medications, and discharge instructions  Description  Complete learning assessment and assess knowledge base    Interventions:  - Provide teaching at level of understanding  - Provide teaching via preferred learning methods  11/5/2019 1352 by Darnell Roberson RN  Outcome: Completed  11/5/2019 1251 by Darnell Roberson RN  Outcome: Adequate for Discharge  11/5/2019 0735 by Darnell Roberson RN  Outcome: Progressing     Problem: GASTROINTESTINAL - ADULT  Goal: Minimal or absence of nausea and/or vomiting  Description  INTERVENTIONS:  - Administer IV fluids if ordered to ensure adequate hydration  - Maintain NPO status until nausea and vomiting are resolved  - Nasogastric tube if ordered  - Administer ordered antiemetic medications as needed  - Provide nonpharmacologic comfort measures as appropriate  - Advance diet as tolerated, if ordered  - Consider nutrition services referral to assist patient with adequate nutrition and appropriate food choices  11/5/2019 1352 by Darnell Roberson RN  Outcome: Completed  11/5/2019 1251 by Darnell Roberson RN  Outcome: Adequate for Discharge  11/5/2019 0735 by Darnell Roberson RN  Outcome: Progressing  Goal: Maintains or returns to baseline bowel function  Description  INTERVENTIONS:  - Assess bowel function  - Encourage oral fluids to ensure adequate hydration  - Administer IV fluids if ordered to ensure adequate hydration  - Administer ordered medications as needed  - Encourage mobilization and activity  - Consider nutritional services referral to assist patient with adequate nutrition and appropriate food choices  11/5/2019 1352 by Darnell Roberson RN  Outcome: Completed  11/5/2019 1251 by Darnell Roberson RN  Outcome: Adequate for Discharge  11/5/2019 0735 by Darnell Roberson RN  Outcome: Progressing  Goal: Maintains adequate nutritional intake  Description  INTERVENTIONS:  - Monitor percentage of each meal consumed  - Identify factors contributing to decreased intake, treat as appropriate  - Assist with meals as needed  - Monitor I&O, weight, and lab values if indicated  - Obtain nutrition services referral as needed  11/5/2019 1352 by Juno Hobbs RN  Outcome: Completed  11/5/2019 1251 by Juno Hobbs RN  Outcome: Adequate for Discharge  11/5/2019 0735 by Juno Hobbs RN  Outcome: Progressing  Goal: Establish and maintain optimal ostomy function  Description  INTERVENTIONS:  - Assess bowel function  - Encourage oral fluids to ensure adequate hydration  - Administer IV fluids if ordered to ensure adequate hydration   - Administer ordered medications as needed  - Encourage mobilization and activity  - Nutrition services referral to assist patient with appropriate food choices  - Assess stoma site  - Consider wound care consult   11/5/2019 1352 by Juno Hobbs RN  Outcome: Completed  11/5/2019 1251 by Juno Hobbs RN  Outcome: Adequate for Discharge  11/5/2019 0735 by Juno Hobbs RN  Outcome: Progressing     Problem: GENITOURINARY - ADULT  Goal: Maintains or returns to baseline urinary function  Description  INTERVENTIONS:  - Assess urinary function  - Encourage oral fluids to ensure adequate hydration if ordered  - Administer IV fluids as ordered to ensure adequate hydration  - Administer ordered medications as needed  - Offer frequent toileting  - Follow urinary retention protocol if ordered  11/5/2019 1352 by Juno Hobbs RN  Outcome: Completed  11/5/2019 1251 by Juno Hobbs RN  Outcome: Adequate for Discharge  11/5/2019 0735 by Juno Hobbs RN  Outcome: Progressing  Goal: Absence of urinary retention  Description  INTERVENTIONS:  - Assess patients ability to void and empty bladder  - Monitor I/O  - Bladder scan as needed  - Discuss with physician/AP medications to alleviate retention as needed  - Discuss catheterization for long term situations as appropriate  11/5/2019 1352 by Juno Hobbs RN  Outcome: Completed  11/5/2019 1251 by Juno Hobbs RN  Outcome: Adequate for Discharge  11/5/2019 0735 by Juno Hobbs RN  Outcome: Progressing  Goal: Urinary catheter remains patent  Description  INTERVENTIONS:  - Assess patency of urinary catheter  - If patient has a chronic dangelo, consider changing catheter if non-functioning  - Follow guidelines for intermittent irrigation of non-functioning urinary catheter  11/5/2019 1352 by aYnique Rojas RN  Outcome: Completed  11/5/2019 1251 by Yanique Rojas RN  Outcome: Adequate for Discharge  11/5/2019 0735 by Yanique Rojas RN  Outcome: Progressing     Problem: SKIN/TISSUE INTEGRITY - ADULT  Goal: Skin integrity remains intact  Description  INTERVENTIONS  - Identify patients at risk for skin breakdown  - Assess and monitor skin integrity  - Assess and monitor nutrition and hydration status  - Monitor labs (i e  albumin)  - Assess for incontinence   - Turn and reposition patient  - Assist with mobility/ambulation  - Relieve pressure over bony prominences  - Avoid friction and shearing  - Provide appropriate hygiene as needed including keeping skin clean and dry  - Evaluate need for skin moisturizer/barrier cream  - Collaborate with interdisciplinary team (i e  Nutrition, Rehabilitation, etc )   - Patient/family teaching  11/5/2019 1352 by Yanique Rojas RN  Outcome: Completed  11/5/2019 1251 by Yanique Rojas RN  Outcome: Adequate for Discharge  11/5/2019 0735 by Yanique Rojas RN  Outcome: Progressing  Goal: Incision(s), wounds(s) or drain site(s) healing without S/S of infection  Description  INTERVENTIONS  - Assess and document risk factors for skin impairment   - Assess and document dressing, incision, wound bed, drain sites and surrounding tissue  - Consider nutrition services referral as needed  - Oral mucous membranes remain intact  - Provide patient/ family education  11/5/2019 1352 by Yanique Rojas RN  Outcome: Completed  11/5/2019 1251 by Yanique Rojas RN  Outcome: Adequate for Discharge  11/5/2019 0735 by aYnique Rojas RN  Outcome: Progressing  Goal: Oral mucous membranes remain intact  Description  INTERVENTIONS  - Assess oral mucosa and hygiene practices  - Implement preventative oral hygiene regimen  - Implement oral medicated treatments as ordered  - Initiate Nutrition services referral as needed  11/5/2019 1352 by Sonido Osorio RN  Outcome: Completed  11/5/2019 1251 by Sonido Osorio RN  Outcome: Adequate for Discharge  11/5/2019 0735 by Sonido Osorio RN  Outcome: Progressing     Problem: HEMATOLOGIC - ADULT  Goal: Maintains hematologic stability  Description  INTERVENTIONS  - Assess for signs and symptoms of bleeding or hemorrhage  - Monitor labs  - Administer supportive blood products/factors as ordered and appropriate  11/5/2019 1352 by Sonido Osorio RN  Outcome: Completed  11/5/2019 1251 by Sonido Osorio RN  Outcome: Adequate for Discharge  11/5/2019 0735 by Sonido Osorio RN  Outcome: Progressing     Problem: MUSCULOSKELETAL - ADULT  Goal: Maintain or return mobility to safest level of function  Description  INTERVENTIONS:  - Assess patient's ability to carry out ADLs; assess patient's baseline for ADL function and identify physical deficits which impact ability to perform ADLs (bathing, care of mouth/teeth, toileting, grooming, dressing, etc )  - Assess/evaluate cause of self-care deficits   - Assess range of motion  - Assess patient's mobility  - Assess patient's need for assistive devices and provide as appropriate  - Encourage maximum independence but intervene and supervise when necessary  - Involve family in performance of ADLs  - Assess for home care needs following discharge   - Consider OT consult to assist with ADL evaluation and planning for discharge  - Provide patient education as appropriate  11/5/2019 1352 by Sonido Osorio RN  Outcome: Completed  11/5/2019 1251 by Sonido Osorio RN  Outcome: Adequate for Discharge  11/5/2019 0735 by Sonido Osorio RN  Outcome: Progressing  Goal: Maintain proper alignment of affected body part  Description  INTERVENTIONS:  - Support, maintain and protect limb and body alignment  - Provide patient/ family with appropriate education  11/5/2019 1352 by Osiris Fairchild RN  Outcome: Completed  11/5/2019 1251 by Osiris Fairchild RN  Outcome: Adequate for Discharge  11/5/2019 0735 by Osiris Fairchild, RN  Outcome: Progressing

## 2019-11-05 NOTE — DISCHARGE SUMMARY
Discharge- Pili Garza Moscat 1960, 63 y o  male MRN: [id number]    [facility] [id number] Encounter: [id number]    Primary Care Provider: Yoseph Daugherty MD   Date and time admitted to hospital: 10/28/2019  3:48 AM        Sepsis [facility])  Assessment & Plan  · Sepsis most likely secondary to UTI, infected penile prosthesis  · Sepsis now improving, status post penile prosthesis removal  · Completed course of IV invanz    * SBO (small bowel obstruction) ([address] )  Assessment & Plan  This has resolved  No abd pain    UTI (urinary tract infection)  Assessment & Plan  · Completed course of invanz    Hypokalemia  Assessment & Plan  · repleted    Malfunction of penile prosthesis ([facility] [address] )  Assessment & Plan  · With eroded urethra  · Status post suprapubic catheter placement previously  · Prosthesis removed this admission by urology    History of Clostridium difficile colitis  Assessment & Plan  Continue prophylactic oral vancomycin through 11/8/19  Chronic, continuous use of opioids  Assessment & Plan  · PDMP reviewed  · Dicussed with patient  Has been on oxycodone 20 mg TID for some time  · Was recently changed to 20 mg oxycontin BID but this was not approved by his insurance so was still taking oxycodone 20 TID  · Will change back to home regimen of oxycodone 20 mg PO TID    Decubitus ulcer of ischium, left, stage IV (HCC)  Assessment & Plan  · Chronic left decubitus ulcer recent with chronic osteomyelitis  · No evidence of acute infection  · Follow with wound care management plan and recommendation    Diabetes mellitus type II, controlled [facility])  Assessment & Plan  Lab Results   Component Value Date    HGBA1C 5 2 05/06/2019     No results for input(s): POCGLU in the last 72 hours    Blood Sugar Average: Last 72 hrs:     · Well controlled      Discharging Physician / Practitioner: Ligia Gomez DO  PCP: Yoseph Daugherty MD  Admission Date:   Admission Orders (From admission, onward)     Ordered        10/28/19 1005  Inpatient Admission  Once                   Discharge Date: 11/05/19    Resolved Problems  Date Reviewed: 11/5/2019          Resolved    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, subsequent encounter 11/4/2019     Resolved by  Nieves De La O PA-C            Consultations During Hospital Stay:  · Urology  · Infectious disease      Procedures Performed:     · Penile prosthesis removal      Reason for Admission: abdominal pain      Hospital Course: Jamie Nice is a 63 y o  male patient who originally presented to the hospital on 10/28/2019 due to abdominal pain  He was found to have small bowel obstruction  This was treated conservatively and resolved on its own  Patient however was also found to have sepsis from a UTI and an infected penile prosthesis  The prosthesis was removed by urology  The patient completed a course of IV Invanz per ID  Patient is ready for discharge to SNF  Please see above list of diagnoses and related plan for additional information  Condition at Discharge: good       Discharge Day Visit / Exam:     Subjective:  Feels well  No abominal pain  Eating and drinking well  Vitals: Blood Pressure: 134/84 (11/05/19 0749)  Pulse: 67 (11/05/19 0749)  Temperature: 98 1 °F (36 7 °C) (11/05/19 0749)  Temp Source: Oral (11/01/19 0330)  Respirations: 18 (11/05/19 0749)  Height: 5' 7" (170 2 cm) (10/31/19 1344)  Weight - Scale: 66 7 kg (147 lb 0 8 oz) (10/28/19 0343)  SpO2: 98 % (11/05/19 0749)    Exam:     Physical Exam   HENT:   Head: Normocephalic and atraumatic  Cardiovascular: Normal rate and regular rhythm  Exam reveals no gallop and no friction rub  No murmur heard  Pulmonary/Chest: Effort normal and breath sounds normal  He has no wheezes  He has no rales  Abdominal: Soft  Bowel sounds are normal  There is no tenderness  Musculoskeletal: He exhibits no edema  Nursing note and vitals reviewed                Discharge instructions/Information to patient and family:   See after visit summary for information provided to patient and family  Provisions for Follow-Up Care:  See after visit summary for information related to follow-up care and any pertinent home health orders  Disposition:     Other: SNF       Discharge Statement:  I spent 45 minutes discharging the patient  This time was spent on the day of discharge  I had direct contact with the patient on the day of discharge  Greater than 50% of the total time was spent examining patient, answering all patient questions, arranging and discussing plan of care with patient as well as directly providing post-discharge instructions  Additional time then spent on discharge activities  Discharge Medications:  See after visit summary for reconciled discharge medications provided to patient and family        ** Please Note: This note has been constructed using a voice recognition system **

## 2019-11-05 NOTE — PLAN OF CARE
Problem: Potential for Falls  Goal: Patient will remain free of falls  Description  INTERVENTIONS:  - Assess patient frequently for physical needs  -  Identify cognitive and physical deficits and behaviors that affect risk of falls  -  [place] fall precautions as indicated by assessment   - Educate patient/family on patient safety including physical limitations  - Instruct patient to call for assistance with activity based on assessment  - Modify environment to reduce risk of injury  - Consider OT/PT consult to assist with strengthening/mobility  Outcome: Progressing     Problem: Prexisting or High Potential for Compromised Skin Integrity  Goal: Skin integrity is maintained or improved  Description  INTERVENTIONS:  - Identify patients at risk for skin breakdown  - Assess and monitor skin integrity  - Assess and monitor nutrition and hydration status  - Monitor labs   - Assess for incontinence   - Turn and reposition patient  - Assist with mobility/ambulation  - Relieve pressure over bony prominences  - Avoid friction and shearing  - Provide appropriate hygiene as needed including keeping skin clean and dry  - Evaluate need for skin moisturizer/barrier cream  - Collaborate with interdisciplinary team   - Patient/family teaching  - Consider wound care consult   Outcome: Progressing     Problem: Nutrition/Hydration-ADULT  Goal: Nutrient/Hydration intake appropriate for improving, restoring or maintaining nutritional needs  Description  Monitor and assess patient's nutrition/hydration status for malnutrition  Collaborate with interdisciplinary team and initiate plan and interventions as ordered  Monitor patient's weight and dietary intake as ordered or per policy  Utilize nutrition screening tool and intervene as necessary  Determine patient's food preferences and provide high-protein, high-caloric foods as appropriate       INTERVENTIONS:  - Monitor oral intake, urinary output, labs, and treatment plans  - Assess nutrition and hydration status and recommend course of action  - Evaluate amount of meals eaten  - Assist patient with eating if necessary   - Allow adequate time for meals  - Recommend/ encourage appropriate diets, oral nutritional supplements, and vitamin/mineral supplements  - Order, calculate, and assess calorie counts as needed  - Recommend, monitor, and adjust tube feedings and TPN/PPN based on assessed needs  - Assess need for intravenous fluids  - Provide specific nutrition/hydration education as appropriate  - Include patient/family/caregiver in decisions related to nutrition  Outcome: Progressing     Problem: PAIN - ADULT  Goal: Verbalizes/displays adequate comfort level or baseline comfort level  Description  Interventions:  - Encourage patient to monitor pain and request assistance  - Assess pain using appropriate pain scale  - Administer analgesics based on type and severity of pain and evaluate response  - Implement non-pharmacological measures as appropriate and evaluate response  - Consider cultural and social influences on pain and pain management  - Notify physician/advanced practitioner if interventions unsuccessful or patient reports new pain  Outcome: Progressing     Problem: INFECTION - ADULT  Goal: Absence or prevention of progression during hospitalization  Description  INTERVENTIONS:  - Assess and monitor for signs and symptoms of infection  - Monitor lab/diagnostic results  - Monitor all insertion sites, i e  indwelling lines, tubes, and drains  - Monitor endotracheal if appropriate and nasal secretions for changes in amount and color  - Success appropriate cooling/warming therapies per order  - Administer medications as ordered  - Instruct and encourage patient and family to use good hand hygiene technique  - Identify and instruct in appropriate isolation precautions for identified infection/condition  Outcome: Progressing     Problem: SAFETY ADULT  Goal: Maintain or return to baseline ADL function  Description  INTERVENTIONS:  -  Assess patient's ability to carry out ADLs; assess patient's baseline for ADL function and identify physical deficits which impact ability to perform ADLs (bathing, care of mouth/teeth, toileting, grooming, dressing, etc )  - Assess/evaluate cause of self-care deficits   - Assess range of motion  - Assess patient's mobility; develop plan if impaired  - Assess patient's need for assistive devices and provide as appropriate  - Encourage maximum independence but intervene and supervise when necessary  - Involve family in performance of ADLs  - Assess for home care needs following discharge   - Consider OT consult to assist with ADL evaluation and planning for discharge  - Provide patient education as appropriate  Outcome: Progressing  Goal: Maintain or return mobility status to optimal level  Description  INTERVENTIONS:  - Assess patient's baseline mobility status (ambulation, transfers, stairs, etc )    - Identify cognitive and physical deficits and behaviors that affect mobility  - Identify mobility aids required to assist with transfers and/or ambulation (gait belt, sit-to-stand, lift, walker, cane, etc )  - [place] fall precautions as indicated by assessment  - Record patient progress and toleration of activity level on Mobility SBAR; progress patient to next Phase/Stage  - Instruct patient to call for assistance with activity based on assessment  - Consider rehabilitation consult to assist with strengthening/weightbearing, etc   Outcome: Progressing     Problem: DISCHARGE PLANNING  Goal: Discharge to home or other facility with appropriate resources  Description  INTERVENTIONS:  - Identify barriers to discharge w/patient and caregiver  - Arrange for needed discharge resources and transportation as appropriate  - Identify discharge learning needs (meds, wound care, etc )  - Arrange for interpretive services to assist at discharge as needed  - Refer to Case Management Department for coordinating discharge planning if the patient needs post-hospital services based on physician/advanced practitioner order or complex needs related to functional status, cognitive ability, or social support system  Outcome: Progressing     Problem: Knowledge Deficit  Goal: Patient/family/caregiver demonstrates understanding of disease process, treatment plan, medications, and discharge instructions  Description  Complete learning assessment and assess knowledge base    Interventions:  - Provide teaching at level of understanding  - Provide teaching via preferred learning methods  Outcome: Progressing     Problem: GASTROINTESTINAL - ADULT  Goal: Minimal or absence of nausea and/or vomiting  Description  INTERVENTIONS:  - Administer IV fluids if ordered to ensure adequate hydration  - Maintain NPO status until nausea and vomiting are resolved  - Nasogastric tube if ordered  - Administer ordered antiemetic medications as needed  - Provide nonpharmacologic comfort measures as appropriate  - Advance diet as tolerated, if ordered  - Consider nutrition services referral to assist patient with adequate nutrition and appropriate food choices  Outcome: Progressing  Goal: Maintains or returns to baseline bowel function  Description  INTERVENTIONS:  - Assess bowel function  - Encourage oral fluids to ensure adequate hydration  - Administer IV fluids if ordered to ensure adequate hydration  - Administer ordered medications as needed  - Encourage mobilization and activity  - Consider nutritional services referral to assist patient with adequate nutrition and appropriate food choices  Outcome: Progressing  Goal: Maintains adequate nutritional intake  Description  INTERVENTIONS:  - Monitor percentage of each meal consumed  - Identify factors contributing to decreased intake, treat as appropriate  - Assist with meals as needed  - Monitor I&O, weight, and lab values if indicated  - Obtain nutrition services referral as needed  Outcome: Progressing  Goal: Establish and maintain optimal ostomy function  Description  INTERVENTIONS:  - Assess bowel function  - Encourage oral fluids to ensure adequate hydration  - Administer IV fluids if ordered to ensure adequate hydration   - Administer ordered medications as needed  - Encourage mobilization and activity  - Nutrition services referral to assist patient with appropriate food choices  - Assess stoma site  - Consider wound care consult   Outcome: Progressing     Problem: GENITOURINARY - ADULT  Goal: Maintains or returns to baseline urinary function  Description  INTERVENTIONS:  - Assess urinary function  - Encourage oral fluids to ensure adequate hydration if ordered  - Administer IV fluids as ordered to ensure adequate hydration  - Administer ordered medications as needed  - Offer frequent toileting  - Follow urinary retention protocol if ordered  Outcome: Progressing  Goal: Absence of urinary retention  Description  INTERVENTIONS:  - Assess patients ability to void and empty bladder  - Monitor I/O  - Bladder scan as needed  - Discuss with physician/AP medications to alleviate retention as needed  - Discuss catheterization for long term situations as appropriate  Outcome: Progressing  Goal: Urinary catheter remains patent  Description  INTERVENTIONS:  - Assess patency of urinary catheter  - If patient has a chronic dangelo, consider changing catheter if non-functioning  - Follow guidelines for intermittent irrigation of non-functioning urinary catheter  Outcome: Progressing     Problem: SKIN/TISSUE INTEGRITY - ADULT  Goal: Skin integrity remains intact  Description  INTERVENTIONS  - Identify patients at risk for skin breakdown  - Assess and monitor skin integrity  - Assess and monitor nutrition and hydration status  - Monitor labs (i e  albumin)  - Assess for incontinence   - Turn and reposition patient  - Assist with mobility/ambulation  - Relieve pressure over bony prominences  - Avoid friction and shearing  - Provide appropriate hygiene as needed including keeping skin clean and dry  - Evaluate need for skin moisturizer/barrier cream  - Collaborate with interdisciplinary team (i e  Nutrition, Rehabilitation, etc )   - Patient/family teaching  Outcome: Progressing  Goal: Incision(s), wounds(s) or drain site(s) healing without S/S of infection  Description  INTERVENTIONS  - Assess and document risk factors for skin impairment   - Assess and document dressing, incision, wound bed, drain sites and surrounding tissue  - Consider nutrition services referral as needed  - Oral mucous membranes remain intact  - Provide patient/ family education  Outcome: Progressing  Goal: Oral mucous membranes remain intact  Description  INTERVENTIONS  - Assess oral mucosa and hygiene practices  - Implement preventative oral hygiene regimen  - Implement oral medicated treatments as ordered  - Initiate Nutrition services referral as needed  Outcome: Progressing     Problem: HEMATOLOGIC - ADULT  Goal: Maintains hematologic stability  Description  INTERVENTIONS  - Assess for signs and symptoms of bleeding or hemorrhage  - Monitor labs  - Administer supportive blood products/factors as ordered and appropriate  Outcome: Progressing     Problem: MUSCULOSKELETAL - ADULT  Goal: Maintain or return mobility to safest level of function  Description  INTERVENTIONS:  - Assess patient's ability to carry out ADLs; assess patient's baseline for ADL function and identify physical deficits which impact ability to perform ADLs (bathing, care of mouth/teeth, toileting, grooming, dressing, etc )  - Assess/evaluate cause of self-care deficits   - Assess range of motion  - Assess patient's mobility  - Assess patient's need for assistive devices and provide as appropriate  - Encourage maximum independence but intervene and supervise when necessary  - Involve family in performance of ADLs  - Assess for home care needs following discharge - Consider OT consult to assist with ADL evaluation and planning for discharge  - Provide patient education as appropriate  Outcome: Progressing  Goal: Maintain proper alignment of affected body part  Description  INTERVENTIONS:  - Support, maintain and protect limb and body alignment  - Provide patient/ family with appropriate education  Outcome: Progressing

## 2019-11-05 NOTE — ASSESSMENT & PLAN NOTE
Lab Results   Component Value Date    HGBA1C 5 2 05/06/2019     No results for input(s): POCGLU in the last 72 hours    Blood Sugar Average: Last 72 hrs:     · Well controlled

## 2019-11-05 NOTE — ASSESSMENT & PLAN NOTE
· Sepsis most likely secondary to UTI, infected penile prosthesis  · Sepsis now improving, status post penile prosthesis removal  · Completed course of IV invanz

## 2019-11-06 ENCOUNTER — TRANSITIONAL CARE MANAGEMENT (OUTPATIENT)
Dept: FAMILY MEDICINE CLINIC | Facility: CLINIC | Age: 59
End: 2019-11-06

## 2019-11-07 ENCOUNTER — TELEPHONE (OUTPATIENT)
Dept: FAMILY MEDICINE CLINIC | Facility: CLINIC | Age: 59
End: 2019-11-07

## 2019-11-07 DIAGNOSIS — F11.90 CHRONIC, CONTINUOUS USE OF OPIOIDS: Chronic | ICD-10-CM

## 2019-11-07 RX ORDER — OXYCODONE HYDROCHLORIDE 20 MG/1
20 TABLET ORAL 3 TIMES DAILY
Qty: 90 TABLET | Refills: 0 | Status: SHIPPED | OUTPATIENT
Start: 2019-11-07 | End: 2019-12-04 | Stop reason: SDUPTHER

## 2019-11-07 NOTE — TELEPHONE ENCOUNTER
[facility] calling to clarify that pt's catheter was changed 11/01/19 time of procedure   [phone]

## 2019-11-07 NOTE — TELEPHONE ENCOUNTER
Visiting nurse left message on nurse line, pt was only given 15 tabs of oxy at discharge, TCM is scheduled 11/13

## 2019-11-07 NOTE — TELEPHONE ENCOUNTER
Sent script to pharmacy with note not to be filles till 11/10/19 which is 5 days after his discharge

## 2019-11-09 NOTE — PROGRESS NOTES
Vancomycin Assessment    Skeeter Gitelman is a 63 y o  male who is currently receiving vancomycin 1250mg IV every 12 hours for osteomyelitis  Relevant clinical data and objective history reviewed:  Creatinine   Date Value Ref Range Status   09/18/2019 0 46 (L) 0 60 - 1 30 mg/dL Final     Comment:     Standardized to IDMS reference method   09/17/2019 0 51 (L) 0 60 - 1 30 mg/dL Final     Comment:     Standardized to IDMS reference method   09/16/2019 0 42 (L) 0 70 - 1 50 mg/dL Final     Comment:     Standardized to IDMS reference method   02/17/2014 0 59 (L) 0 60 - 1 30 mg/dL Final     Comment:     Standardized to IDMS reference method   01/08/2014 0 44 (L) 0 5 - 1 5 mg/dL Final     Comment:     Standardized to IDMS reference method   01/07/2014 0 61 0 60 - 1 30 mg/dL Final     Comment:     Standardized to IDMS reference method     /74 (BP Location: Left arm)   Pulse 100   Temp 99 6 °F (37 6 °C) (Tympanic)   Resp 16   Ht 5' 7" (1 702 m)   Wt 68 3 kg (150 lb 9 2 oz)   SpO2 99%   BMI 23 58 kg/m²   I/O last 3 completed shifts: In: 0   Out: 2575 [Urine:1800; Stool:775]  Lab Results   Component Value Date/Time    BUN 9 09/18/2019 06:15 AM    BUN 17 02/17/2014 10:08 AM    WBC 9 12 09/18/2019 06:15 AM    WBC 4 78 02/17/2014 10:08 AM    HGB 8 6 (L) 09/18/2019 06:15 AM    HGB 14 5 02/17/2014 10:08 AM    HCT 29 1 (L) 09/18/2019 06:15 AM    HCT 45 8 02/17/2014 10:08 AM    MCV 80 (L) 09/18/2019 06:15 AM    MCV 90 02/17/2014 10:08 AM     (H) 09/18/2019 06:15 AM     02/17/2014 10:08 AM     Temp Readings from Last 3 Encounters:   09/18/19 99 6 °F (37 6 °C) (Tympanic)   09/16/19 98 6 °F (37 °C) (Temporal)   09/13/19 98 7 °F (37 1 °C) (Temporal)     Vancomycin Days of Therapy: 1    Assessment/Plan  The patient is currently on vancomycin utilizing scheduled dosing  The patient is receiving 1250mg IV every 12 hours and is clinically appropriate and dose will be continued   Pharmacy will continue to follow closely for s/sx of nephrotoxicity, infusion reactions and appropriateness of therapy  BMP and CBC will be ordered per protocol  Plan for trough as patient approaches steady state, prior to the 5th dose at approximately 1300 on 9/20/19  Pharmacy will continue to follow the patients culture results and clinical progress daily      Wilmer Chacon, Pharmacist Urine cultures show Providencia, continue Ceftriaxone

## 2019-11-13 ENCOUNTER — OFFICE VISIT (OUTPATIENT)
Dept: FAMILY MEDICINE CLINIC | Facility: CLINIC | Age: 59
End: 2019-11-13

## 2019-11-13 VITALS
RESPIRATION RATE: 18 BRPM | HEART RATE: 70 BPM | OXYGEN SATURATION: 98 % | DIASTOLIC BLOOD PRESSURE: 70 MMHG | SYSTOLIC BLOOD PRESSURE: 110 MMHG | TEMPERATURE: 97.6 F

## 2019-11-13 DIAGNOSIS — T83.490D MALFUNCTION OF PENILE PROSTHESIS, SUBSEQUENT ENCOUNTER: ICD-10-CM

## 2019-11-13 DIAGNOSIS — T83.61XD: Primary | ICD-10-CM

## 2019-11-13 PROBLEM — T83.490A MALFUNCTION OF PENILE PROSTHESIS (HCC): Status: RESOLVED | Noted: 2019-10-09 | Resolved: 2019-11-13

## 2019-11-13 PROBLEM — T83.61XA INFECTED PENILE IMPLANT (HCC): Status: RESOLVED | Noted: 2019-09-16 | Resolved: 2019-11-13

## 2019-11-13 PROCEDURE — 99495 TRANSJ CARE MGMT MOD F2F 14D: CPT | Performed by: FAMILY MEDICINE

## 2019-11-13 NOTE — PROGRESS NOTES
Assessment/Plan:     SBO (small bowel obstruction) (HCC)  Resolved    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(HCC)  Resolved  Implant removed        Diagnoses and all orders for this visit:    Infection of penile implant, subsequent encounter    Malfunction of penile prosthesis, subsequent encounter         Subjective:     Patient ID: Eric Nunez is a 63 y o  male  63 y o  male  patient with paraplegic spinal paralysis, presented to the hospital on 10/28/2019 due to abdominal pain  He was found to have small bowel obstruction  He was treated conservatively and resolved on its own  Patient however was also found to have sepsis from a UTI and an infected penile prosthesis  The prosthesis was removed by urology and a suprapubic catheter was placed, while he ws inpatient  He completed a course of IV Invanz per ID, and was discharged to SNF in stable condition  He is here today for follow up after his discharge from SNF  States he is doing well  No new complains  Is concerned because of issues with insurance not covering his narcotics  Review of Systems   All other systems reviewed and are negative  Objective:     Physical Exam   Constitutional: He is oriented to person, place, and time  He appears well-developed  HENT:   Head: Normocephalic  Right Ear: External ear normal    Left Ear: External ear normal    Nose: Nose normal    Mouth/Throat: Oropharynx is clear and moist    Eyes: Pupils are equal, round, and reactive to light  Conjunctivae and EOM are normal    Neck: Normal range of motion  Neck supple  No thyromegaly present  Cardiovascular: Normal rate, regular rhythm and normal heart sounds  Pulmonary/Chest: Effort normal and breath sounds normal    Abdominal: Soft  There is no tenderness  There is no rebound and no guarding  Neurological: He is alert and oriented to person, place, and time  He has normal reflexes  Skin: Skin is dry  Psychiatric: He has a normal mood and affect  Nursing note and vitals reviewed  Vitals:    11/13/19 1043   BP: 110/70   BP Location: Left arm   Patient Position: Sitting   Cuff Size: Standard   Pulse: 70   Resp: 18   Temp: 97 6 °F (36 4 °C)   TempSrc: Temporal   SpO2: 98%       Transitional Care Management Review:  Eric Nunez is a 63 y o  male here for TCM follow up  During the TCM phone call patient stated:    TCM Call (since 10/13/2019)     Date and time call was made  11/7/2019  9:43 AM    Hospital care reviewed  Records reviewed    Patient was hospitialized at  [place] [id number]    Date of Admission  10/28/19    Date of discharge  11/05/19    Diagnosis  SBO (small bowel obstruction)    Disposition  Home    Were the patients medications reviewed and updated  No    Current Symptoms  None      TCM Call (since 10/13/2019)     Post hospital issues  None    Should patient be enrolled in anticoag monitoring? No    Scheduled for follow up?   Yes    Did you obtain your prescribed medications  Yes    Do you need help managing your prescriptions or medications  No    Is transportation to your appointment needed  No    I have advised the patient to call PCP with any new or worsening symptoms  Adiel Sparrow MD

## 2019-11-14 ENCOUNTER — APPOINTMENT (OUTPATIENT)
Dept: WOUND CARE | Facility: HOSPITAL | Age: 59
End: 2019-11-14
Payer: MEDICARE

## 2019-11-14 PROCEDURE — 11044 DBRDMT BONE 1ST 20 SQ CM/<: CPT

## 2019-11-14 PROCEDURE — 99213 OFFICE O/P EST LOW 20 MIN: CPT

## 2019-11-14 PROCEDURE — 11042 DBRDMT SUBQ TIS 1ST 20SQCM/<: CPT

## 2019-11-18 PROBLEM — N39.0 UTI (URINARY TRACT INFECTION): Status: RESOLVED | Noted: 2019-10-28 | Resolved: 2019-11-18

## 2019-11-18 PROBLEM — N52.8 OTHER MALE ERECTILE DYSFUNCTION: Status: ACTIVE | Noted: 2019-11-18

## 2019-11-18 PROBLEM — N28.1 RENAL CYST: Status: ACTIVE | Noted: 2019-11-18

## 2019-11-18 PROBLEM — L02.91 ABSCESS: Status: RESOLVED | Noted: 2019-09-18 | Resolved: 2019-11-18

## 2019-11-18 PROBLEM — Z12.5 PROSTATE CANCER SCREENING: Status: ACTIVE | Noted: 2019-11-18

## 2019-11-18 NOTE — PROGRESS NOTES
11/20/2019      Chief Complaint   Patient presents with    Wound Check       Assessment and Plan    63 y o  male managed by Dr Brad Lanier    1  Possible complex right upper pole renal cyst  - due for re-imaging in December     2  Neurogenic bladder status post augmentation cystoplasty in 2014 now requiring SPT  - maintain SPT to gravity drainage  - continue with every 6 week changes, will ensure VNA does so     3  Penile prosthesis malfunction s/p explantation 11/1/19  - patient healing well    4  Prostate cancer screening  - PSA due now    FU 1 month with U/S and PSA prior       History of Present Illness  Fortunato Rodriguez is a 63 y o  male here for follow up evaluation of a penile explant secondary to a nonfunctioning implant  The patient state he is healing well with no complications  He is a parapalegic secondary to a spinal cyst  He did undergo an augmentation cystoplasty in 2014 and was [address] following  Unfortunately his urethra had become scarred secondary to erosion of his penile prosthetic  He now has a SPT in place  His is having no trouble with his SPT, this was last changed 11/1/19  He also follows with us for a complex renal cyst   Is due for an U/S 12/2020 for this  He is also due for a PSA, none obtained in the EHR thus far  Review of Systems   Constitutional: Negative for activity change, chills and fever  Gastrointestinal: Negative for abdominal distention and abdominal pain  Musculoskeletal: Negative for back pain and gait problem  Psychiatric/Behavioral: Negative for behavioral problems and confusion         Past Medical History  Past Medical History:   Diagnosis Date    Anemia     Blind     r eye    Chronic cystitis     [facility] place [facility])     Detrusor sphincter dyssynergia     Diabetes mellitus ([facility] [address] )     Poorly controlled type 2; Last Assessed:  3/18/14    Erectile dysfunction     Frequency of urination     GERD (gastroesophageal reflux disease)     History of diabetes mellitus     History of osteomyelitis     Hx of leg amputation (HCC)     r high upper leg    Hyperlipidemia     Hypertension     Hypospadias     Incomplete bladder emptying     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([address] ) 2019    Neurogenic bladder     Paralysis (HCC)     Paraplegia (HCC)     Spinal cord cysts     Ulcer of sacral region [facility])     Urge incontinence        Past Social History  Past Surgical History:   Procedure Laterality Date    AMPUTATION      At hip; Last Assessed:  16    BLADDER SURGERY      COLON SURGERY      llq ostomy pouch    COLOSTOMY      COMPLEX CYSTOMETROGRAM      CT CYSTOGRAM  2019    CYSTOSCOPY      IR PICC REPO  2019    IR SUPRAPUBIC TUBE  2019    LEG AMPUTATION      MEATOTOMY      PENILE PROSTHESIS  REMOVAL N/A 2019    Procedure: REMOVAL PROSTHESIS PENILE;  Surgeon: Cam Tinsley MD;  [facility];  Service: Urology    PENILE PROSTHESIS IMPLANT      VT ADJ TISS XFER SCALP,EXTREM 10 1-30 SQCM Left 2017    Procedure: POSTERIOR THIGH V-Y ADMANCEMENT;  Surgeon: Darryl Ormond, MD;  [facility];  Service: Plastics    VT MUSCLE-SKIN FLAP,TRUNK Left 2017    Procedure: FLAP CLOSURE LEFT ISCHIAL WOUND and "RIGHT" ISCHIAL FLAP ADVANCEMENT * DEBRIDEMENT, VAC PLACEMENT ;  Surgeon: Darryl Ormond, MD;  [facility];  Service: Plastics    VT MUSCLE-SKIN FLAP,TRUNK Left 2017    Procedure: gluteal myocutaneous rotational flap, posterior thigh v to y advancement- wound 5 x 2 5 x 8;  Surgeon: Darryl Ormond, MD;  [facility];  Service: Plastics    SPINE SURGERY      Lower back    UROFLOWMETRY SIMPLE / COMPLEX       Social History     Tobacco Use   Smoking Status Former Smoker    Packs/day: 0 50    Years: 10 00    Pack years: 5 00    Last attempt to quit: Natanael Youngblood Years since quittin 9   Smokeless Tobacco Never Used   Tobacco Comment    Onset date:  11/10/17       Past Family History  Family History   Problem Relation Age of Onset    No Known Problems Mother     No Known Problems Father        Past Social history  Social History     Socioeconomic History    Marital status: /Civil Union     Spouse name: Not on file    Number of children: Not on file    Years of education: Not on file    Highest education level: Not on file   Occupational History    Not on file   Social Needs    Financial resource strain: Not on file    Food insecurity:     Worry: Not on file     Inability: Not on file    Transportation needs:     Medical: Not on file     Non-medical: Not on file   Tobacco Use    Smoking status: Former Smoker     Packs/day: 0 50     Years: 10 00     Pack years: 5 00     Last attempt to quit:      Years since quittin 9    Smokeless tobacco: Never Used    Tobacco comment: Onset date:  11/10/17   Substance and Sexual Activity    Alcohol use: Never     Frequency: Never     Comment: Per [employer]:  Social drinker (Onset date:  11/10/17)    Drug use: No    Sexual activity: Not on file   Lifestyle    Physical activity:     Days per week: Not on file     Minutes per session: Not on file    Stress: Not on file   Relationships    Social connections:     Talks on phone: Not on file     Gets together: Not on file     Attends Congregational service: Not on file     Active member of club or organization: Not on file     Attends meetings of clubs or organizations: Not on file     Relationship status: Not on file    Intimate partner violence:     Fear of current or ex partner: Not on file     Emotionally abused: Not on file     Physically abused: Not on file     Forced sexual activity: Not on file   Other Topics Concern    Not on file   Social History Narrative    [place] [address] [place] [address] Dr Ontiveros    Social history reviewed, unchanged       Current Medications  Current Outpatient Medications   Medication Sig Dispense Refill    ACCU-CHEK FASTCLIX LANCETS MISC USE TWICE DAILY 102 each 0    ACCU-CHEK SMARTVIEW test strip TEST TWICE DAILY 100 each 3    acetaminophen (TYLENOL) 325 mg tablet Take 2 tablets (650 mg total) by mouth every 6 (six) hours as needed for mild pain, headaches or fever 30 tablet 0    ASPIRIN LOW DOSE 81 MG EC tablet TAKE 1 TABLET BY MOUTH EVERY DAY 30 tablet 0    Disposable Gloves (LATEX GLOVES LARGE) MISC Use as needed up to 3 times a day dispo 2 boxes 2 each 11    Incontinence Supply Disposable (SUNBEAM INCONTINENT UNDER PAD) MISC Use 1 per week, dispense 4 reusable underpads 4 each 11    Incontinence Supply Disposable MISC by Does not apply route 2 (two) times a day 60 each 11    omeprazole (PriLOSEC) 20 mg delayed release capsule TAKE 1 CAPSULE(20 MG) BY MOUTH DAILY AT BEDTIME (Patient taking differently: 40 mg daily ) 90 capsule 0    oxyCODONE (ROXICODONE) 20 MG TABS Take 1 tablet (20 mg total) by mouth 3 (three) times a dayMax Daily Amount: 60 mg 90 tablet 0     No current facility-administered medications for this visit  Allergies  No Known Allergies      The following portions of the patient's history were reviewed and updated as appropriate: allergies, current medications, past medical history, past social history, past surgical history and problem list       Vitals  Vitals:    11/20/19 0920   BP: 132/84   BP Location: Left arm   Patient Position: Sitting   Cuff Size: Standard   Pulse: 61           Physical Exam  Constitutional   General appearance: Patient is seated and in no acute distress, well appearing and well nourished  Head and Face   Head and face: Normal     Eyes   Conjunctiva and lids: No erythema, swelling or discharge  Ears, Nose, Mouth, and Throat   Hearing: Normal     Pulmonary   Respiratory effort: No increased work of breathing or signs of respiratory distress  Cardiovascular   Examination of extremities for edema and/or varicosities: Normal     Abdomen   Abdomen: Non-tender, no masses   Colostomy healthy pink and suprapubic tube insertion clean and dry  Genitourinary   Penis uncircumcised, phallus normal, meatus patent  Testicles descended into scrotum bilaterally without masses nor tenderness  Musculoskeletal   Gait and station: Wheel chair  Skin   Skin and subcutaneous tissue: Warm, dry, and intact  No visible lesions or rashes  Psychiatric   Judgment and insight: Normal  Recent and remote memory:  Normal  Mood and affect: Normal        Results  No results found for this or any previous visit (from the past 1 hour(s))  ]  No results found for: PSA  Lab Results   Component Value Date    GLUCOSE 92 02/17/2014    CALCIUM 8 4 11/04/2019     02/17/2014    K 3 6 11/04/2019    CO2 28 11/04/2019     11/04/2019    BUN 15 11/04/2019    CREATININE 0 48 (L) 11/04/2019     Lab Results   Component Value Date    WBC 8 20 11/04/2019    HGB 8 6 (L) 11/04/2019    HCT 29 7 (L) 11/04/2019    MCV 80 (L) 11/04/2019     (H) 11/04/2019           Orders  Orders Placed This Encounter   Procedures    US kidney and bladder     Standing Status:   Future     Standing Expiration Date:   11/20/2023     Scheduling Instructions:      "Prep required if being scheduled in conjunction with other studies, refer to those examination's Preps first before scheduling  All patients for US Kidney and Bladder they must drink 24 oz of water 60 minutes before your scheduled appointment time  This test requires you to have a FULL bladder  Please do not urinate before your test             Please bring your physician order, insurance cards, a form of photo ID and a list of your medications with you  Arrive 15 minutes prior to your appointment time in order to register  If you need to have lab work or a urinalysis, please do this AFTER your ultrasound  "            To schedule this appointment, please contact Central Scheduling at [phone]       Order Specific Question:   Reason for Exam:     Answer:   cyst    PSA, Total Screen This is a patient instruction: This test is non-fasting  Please drink two glasses of water morning of bloodwork          Standing Status:   Future     Standing Expiration Date:   5/20/2021

## 2019-11-20 ENCOUNTER — OFFICE VISIT (OUTPATIENT)
Dept: UROLOGY | Facility: MEDICAL CENTER | Age: 59
End: 2019-11-20
Payer: MEDICARE

## 2019-11-20 VITALS — HEART RATE: 61 BPM | DIASTOLIC BLOOD PRESSURE: 84 MMHG | SYSTOLIC BLOOD PRESSURE: 132 MMHG

## 2019-11-20 DIAGNOSIS — N52.8 OTHER MALE ERECTILE DYSFUNCTION: ICD-10-CM

## 2019-11-20 DIAGNOSIS — N31.9 NEUROGENIC BLADDER: ICD-10-CM

## 2019-11-20 DIAGNOSIS — N28.1 RENAL CYST: Primary | ICD-10-CM

## 2019-11-20 DIAGNOSIS — Z12.5 PROSTATE CANCER SCREENING: ICD-10-CM

## 2019-11-20 PROCEDURE — 99213 OFFICE O/P EST LOW 20 MIN: CPT | Performed by: PHYSICIAN ASSISTANT

## 2019-11-27 DIAGNOSIS — E11.9 TYPE 2 DIABETES MELLITUS WITHOUT COMPLICATION, WITHOUT LONG-TERM CURRENT USE OF INSULIN (HCC): ICD-10-CM

## 2019-11-27 RX ORDER — LANCETS
EACH MISCELLANEOUS
Qty: 102 EACH | Refills: 0 | Status: SHIPPED | OUTPATIENT
Start: 2019-11-27 | End: 2019-12-27 | Stop reason: SDUPTHER

## 2019-11-29 DIAGNOSIS — G89.29 CHRONIC LOW BACK PAIN WITHOUT SCIATICA, UNSPECIFIED BACK PAIN LATERALITY: ICD-10-CM

## 2019-11-29 DIAGNOSIS — M54.50 CHRONIC LOW BACK PAIN WITHOUT SCIATICA, UNSPECIFIED BACK PAIN LATERALITY: ICD-10-CM

## 2019-11-30 RX ORDER — IBUPROFEN 800 MG/1
TABLET ORAL
Qty: 60 TABLET | Refills: 0 | OUTPATIENT
Start: 2019-11-30

## 2019-12-04 DIAGNOSIS — F11.90 CHRONIC, CONTINUOUS USE OF OPIOIDS: Chronic | ICD-10-CM

## 2019-12-05 ENCOUNTER — HOSPITAL ENCOUNTER (OUTPATIENT)
Dept: ULTRASOUND IMAGING | Facility: HOSPITAL | Age: 59
Discharge: HOME | End: 2019-12-05
Attending: UROLOGY
Payer: MEDICARE

## 2019-12-05 ENCOUNTER — APPOINTMENT (OUTPATIENT)
Dept: LAB | Facility: HOSPITAL | Age: 59
End: 2019-12-05
Payer: MEDICARE

## 2019-12-05 DIAGNOSIS — N28.1 RENAL CYST: ICD-10-CM

## 2019-12-05 DIAGNOSIS — N31.9 NEUROGENIC BLADDER: ICD-10-CM

## 2019-12-05 DIAGNOSIS — Z12.5 PROSTATE CANCER SCREENING: ICD-10-CM

## 2019-12-05 LAB — PSA SERPL-MCNC: 4.4 NG/ML (ref 0–4)

## 2019-12-05 PROCEDURE — 36415 COLL VENOUS BLD VENIPUNCTURE: CPT

## 2019-12-05 PROCEDURE — G0103 PSA SCREENING: HCPCS

## 2019-12-05 PROCEDURE — 76770 US EXAM ABDO BACK WALL COMP: CPT

## 2019-12-06 RX ORDER — OXYCODONE HYDROCHLORIDE 20 MG/1
20 TABLET ORAL 3 TIMES DAILY
Qty: 90 TABLET | Refills: 0 | Status: SHIPPED | OUTPATIENT
Start: 2019-12-06 | End: 2020-01-06 | Stop reason: SDUPTHER

## 2019-12-12 ENCOUNTER — APPOINTMENT (OUTPATIENT)
Dept: WOUND CARE | Facility: HOSPITAL | Age: 59
End: 2019-12-12
Payer: MEDICARE

## 2019-12-12 PROCEDURE — 11042 DBRDMT SUBQ TIS 1ST 20SQCM/<: CPT

## 2019-12-27 DIAGNOSIS — E11.9 TYPE 2 DIABETES MELLITUS WITHOUT COMPLICATION, WITHOUT LONG-TERM CURRENT USE OF INSULIN (HCC): ICD-10-CM

## 2019-12-27 DIAGNOSIS — K21.9 GASTROESOPHAGEAL REFLUX DISEASE WITHOUT ESOPHAGITIS: ICD-10-CM

## 2019-12-27 DIAGNOSIS — M54.50 CHRONIC LOW BACK PAIN WITHOUT SCIATICA, UNSPECIFIED BACK PAIN LATERALITY: ICD-10-CM

## 2019-12-27 DIAGNOSIS — G89.29 CHRONIC LOW BACK PAIN WITHOUT SCIATICA, UNSPECIFIED BACK PAIN LATERALITY: ICD-10-CM

## 2019-12-30 RX ORDER — IBUPROFEN 800 MG/1
TABLET ORAL
Qty: 60 TABLET | Refills: 0 | Status: SHIPPED | OUTPATIENT
Start: 2019-12-30 | End: 2020-05-12

## 2019-12-30 RX ORDER — LANCETS
EACH MISCELLANEOUS
Qty: 102 EACH | Refills: 0 | Status: SHIPPED | OUTPATIENT
Start: 2019-12-30 | End: 2021-05-13 | Stop reason: SDUPTHER

## 2019-12-30 RX ORDER — BLOOD SUGAR DIAGNOSTIC
STRIP MISCELLANEOUS
Qty: 100 EACH | Refills: 0 | Status: SHIPPED | OUTPATIENT
Start: 2019-12-30 | End: 2021-05-13 | Stop reason: ALTCHOICE

## 2019-12-30 RX ORDER — OMEPRAZOLE 20 MG/1
20 CAPSULE, DELAYED RELEASE ORAL DAILY
Qty: 90 CAPSULE | Refills: 0 | Status: SHIPPED | OUTPATIENT
Start: 2019-12-30 | End: 2022-03-02

## 2020-01-03 ENCOUNTER — TELEPHONE (OUTPATIENT)
Dept: FAMILY MEDICINE CLINIC | Facility: CLINIC | Age: 60
End: 2020-01-03

## 2020-01-06 ENCOUNTER — TELEPHONE (OUTPATIENT)
Dept: FAMILY MEDICINE CLINIC | Facility: CLINIC | Age: 60
End: 2020-01-06

## 2020-01-06 DIAGNOSIS — K21.9 GASTROESOPHAGEAL REFLUX DISEASE, ESOPHAGITIS PRESENCE NOT SPECIFIED: ICD-10-CM

## 2020-01-06 DIAGNOSIS — F11.90 CHRONIC, CONTINUOUS USE OF OPIOIDS: Chronic | ICD-10-CM

## 2020-01-06 RX ORDER — OXYCODONE HYDROCHLORIDE 20 MG/1
20 TABLET ORAL 3 TIMES DAILY
Qty: 90 TABLET | Refills: 0 | Status: SHIPPED | OUTPATIENT
Start: 2020-01-06 | End: 2020-02-05 | Stop reason: SDUPTHER

## 2020-01-06 RX ORDER — OMEPRAZOLE 40 MG/1
CAPSULE, DELAYED RELEASE ORAL
Qty: 90 CAPSULE | Refills: 0 | Status: SHIPPED | OUTPATIENT
Start: 2020-01-06 | End: 2020-04-06

## 2020-01-28 ENCOUNTER — APPOINTMENT (OUTPATIENT)
Dept: WOUND CARE | Facility: HOSPITAL | Age: 60
End: 2020-01-28
Payer: MEDICARE

## 2020-01-28 PROCEDURE — 11042 DBRDMT SUBQ TIS 1ST 20SQCM/<: CPT

## 2020-01-28 PROCEDURE — 99213 OFFICE O/P EST LOW 20 MIN: CPT

## 2020-01-31 ENCOUNTER — TELEPHONE (OUTPATIENT)
Dept: FAMILY MEDICINE CLINIC | Facility: CLINIC | Age: 60
End: 2020-01-31

## 2020-01-31 DIAGNOSIS — M54.50 CHRONIC LOW BACK PAIN WITHOUT SCIATICA, UNSPECIFIED BACK PAIN LATERALITY: ICD-10-CM

## 2020-01-31 DIAGNOSIS — G89.29 CHRONIC LOW BACK PAIN WITHOUT SCIATICA, UNSPECIFIED BACK PAIN LATERALITY: ICD-10-CM

## 2020-01-31 RX ORDER — ASPIRIN 81 MG/1
TABLET, COATED ORAL
Qty: 30 TABLET | Refills: 0 | Status: SHIPPED | OUTPATIENT
Start: 2020-01-31 | End: 2020-05-12

## 2020-01-31 NOTE — TELEPHONE ENCOUNTER
Patient called for refills on   He is aware that is due on 02/05 he just wants to make sure it in the system    oxyCODONE (ROXICODONE) 20 MG TABS

## 2020-02-05 ENCOUNTER — TELEPHONE (OUTPATIENT)
Dept: FAMILY MEDICINE CLINIC | Facility: CLINIC | Age: 60
End: 2020-02-05

## 2020-02-05 DIAGNOSIS — F11.90 CHRONIC, CONTINUOUS USE OF OPIOIDS: Chronic | ICD-10-CM

## 2020-02-05 RX ORDER — OXYCODONE HYDROCHLORIDE 20 MG/1
20 TABLET ORAL 3 TIMES DAILY
Qty: 90 TABLET | Refills: 0 | Status: SHIPPED | OUTPATIENT
Start: 2020-02-05 | End: 2020-03-05 | Stop reason: SDUPTHER

## 2020-02-05 NOTE — TELEPHONE ENCOUNTER
Patient called for refill on Oxycodone as well as some medical supplies such as gloves and bed pads ect  He wasn't sure of the rest of the supplies needed and mentioned a change needing to be made in order for insurance to cover it  Please reach out to pt to clarify

## 2020-02-10 ENCOUNTER — TELEPHONE (OUTPATIENT)
Dept: FAMILY MEDICINE CLINIC | Facility: CLINIC | Age: 60
End: 2020-02-10

## 2020-02-10 DIAGNOSIS — L89.324 STAGE IV PRESSURE ULCER OF LEFT BUTTOCK (HCC): ICD-10-CM

## 2020-02-10 DIAGNOSIS — G82.20 PARAPLEGIA (HCC): ICD-10-CM

## 2020-02-10 DIAGNOSIS — E11.9 TYPE 2 DIABETES MELLITUS WITHOUT COMPLICATION, WITHOUT LONG-TERM CURRENT USE OF INSULIN (HCC): ICD-10-CM

## 2020-02-10 DIAGNOSIS — N31.9 NEUROGENIC DYSFUNCTION OF THE URINARY BLADDER: ICD-10-CM

## 2020-02-10 RX ORDER — MEDICAL SUPPLY, MISCELLANEOUS
EACH MISCELLANEOUS
Qty: 2 EACH | Refills: 11 | Status: SHIPPED | OUTPATIENT
Start: 2020-02-10 | End: 2020-02-26 | Stop reason: SDUPTHER

## 2020-02-10 NOTE — TELEPHONE ENCOUNTER
[facility] is out of network for pt's secondary insurance which is [employer] and is the insurance that will cover the costs, per Radha Marcus / [facility]  [employer] will be the DME company to fax Rx to @ [phone]  Pt is also asking for Rx for cloth underpads  He said he also used to get these filled at [facility]  Can you please order this if appropriate  The Rx's need to be faxed manually  Please let me know when the orders are signed and printed so I may fax them  Thanks so much

## 2020-02-21 ENCOUNTER — OFFICE VISIT (OUTPATIENT)
Dept: UROLOGY | Facility: MEDICAL CENTER | Age: 60
End: 2020-02-21
Payer: MEDICARE

## 2020-02-21 VITALS — HEART RATE: 68 BPM | DIASTOLIC BLOOD PRESSURE: 82 MMHG | SYSTOLIC BLOOD PRESSURE: 122 MMHG

## 2020-02-21 DIAGNOSIS — R97.20 ELEVATED PSA: Primary | ICD-10-CM

## 2020-02-21 PROCEDURE — 1036F TOBACCO NON-USER: CPT | Performed by: UROLOGY

## 2020-02-21 PROCEDURE — 3074F SYST BP LT 130 MM HG: CPT | Performed by: UROLOGY

## 2020-02-21 PROCEDURE — 3079F DIAST BP 80-89 MM HG: CPT | Performed by: UROLOGY

## 2020-02-21 PROCEDURE — 99214 OFFICE O/P EST MOD 30 MIN: CPT | Performed by: UROLOGY

## 2020-02-21 NOTE — PROGRESS NOTES
[address] [facility]  [facility]  [address] [facility]  [place], [address] [place] [address]  [phone]  www  [facility]  org      NAME: Rikki Arguello  AGE: 62 y o  SEX: male  : 1960   MRN: [id number]    DATE: 2020  TIME: 11:12 AM    Assessment and Plan :    Neurogenic bladder:  My recommendation at this time is to continue with the suprapubic tube as he is dry and he recently had IPP explantation with sepsis- I think we need to give his body a break and we will do monthly suprapubic tube changes  Visiting nurses do the suprapubic tube changes  He wishes to resume self catheterization and I think we can readdress this issue in about 3 months when I see him back with a PSA  For right now I just want him to heal and try to keep him out of the hospital     Infected IPP:  Explanted as below  Elevated PSA:  Recheck in 3 months  Chief Complaint   No chief complaint on file  History of Present Illness   58-year-old man with paraplegia and neurogenic bladder status post augmentation cystoplasty by me years ago  He had subsequently undergone penile prosthesis by Dr Johnson Erb  He underwent explantation of the prosthesis 2019 due to infection and erosion  He has a suprapubic tube in and he is inquiring about resuming self catheterization and removal of the suprapubic tube  He is doing very well now  Elevated PSA:  PSA was 4 4 as of 2019  He would be a formidable candidate for prostatectomy or even radiation if he is found to have prostate cancer  We will recheck this in the near future      The following portions of the patient's history were reviewed and updated as appropriate: allergies, current medications, past family history, past medical history, past social history, past surgical history and problem list   Past Medical History:   Diagnosis Date    Anemia     Blind     r eye    Chronic cystitis     [facility] in [facility] ([facility] [address] )     Detrusor sphincter dyssynergia     Diabetes mellitus ([facility] [address] )     Poorly controlled type 2; Last Assessed:  3/18/14    Erectile dysfunction     Frequency of urination     GERD (gastroesophageal reflux disease)     History of diabetes mellitus     History of osteomyelitis     Hx of leg amputation (HCC)     r high upper leg    Hyperlipidemia     Hypertension     Hypospadias     Incomplete bladder emptying     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([facility] [address] ) 9/16/2019    Neurogenic bladder     Paralysis ([facility] )     Paraplegia ([facility])     Spinal cord cysts     Ulcer of sacral region ([facility] [address] )     Urge incontinence      Past Surgical History:   Procedure Laterality Date    AMPUTATION      At hip; Last Assessed:  1/19/16    BLADDER SURGERY      COLON SURGERY      llq ostomy pouch    COLOSTOMY      COMPLEX CYSTOMETROGRAM  2014    CT CYSTOGRAM  9/19/2019    CYSTOSCOPY  2014    IR PICC REPO  9/23/2019    IR SUPRAPUBIC TUBE  9/19/2019    LEG AMPUTATION      MEATOTOMY      PENILE PROSTHESIS  REMOVAL N/A 11/1/2019    Procedure: REMOVAL PROSTHESIS PENILE;  Surgeon: Lizbeth Sawant MD;  [facility];  Service: Urology    PENILE PROSTHESIS IMPLANT  2011    IL ADJ TISS XFER SCALP,EXTREM 10 1-30 SQCM Left 5/1/2017    Procedure: POSTERIOR THIGH V-Y ADMANCEMENT;  Surgeon: Francesca Ocampo MD;  [facility];  Service: Plastics    IL MUSCLE-SKIN FLAP,TRUNK Left 5/1/2017    Procedure: FLAP CLOSURE LEFT ISCHIAL WOUND and "RIGHT" ISCHIAL FLAP ADVANCEMENT * DEBRIDEMENT, VAC PLACEMENT ;  Surgeon: Francesca Ocampo MD;  [facility];  Service: Plastics    IL MUSCLE-SKIN FLAP,TRUNK Left 9/27/2017    Procedure: gluteal myocutaneous rotational flap, posterior thigh v to y advancement- wound 5 x 2 5 x 8;  Surgeon: Francesca Ocampo MD;  [facility];  Service: Plastics    SPINE SURGERY      Lower back    UROFLOWMETRY SIMPLE / COMPLEX  2014     shoulder  Review of Systems   Review of Systems   Constitutional: Negative for fever  Genitourinary: Negative for penile pain and scrotal swelling  Has suprapubic tube in place       Active Problem List     Patient Active Problem List   Diagnosis    Stage IV pressure ulcer of sacral region ([facility] [address] )    Stage IV pressure ulcer of left heel (HCC)    Cyst of joint of shoulder    Anemia    Paraplegic spinal paralysis ([facility] [address] )    Sinus tachycardia    GERD (gastroesophageal reflux disease)    History of osteomyelitis    Anxiety    Amputated right leg (HCC)    Benign essential hypertension    Diabetes mellitus type II, controlled ([facility] [address] )    Diarrhea    Decubitus ulcer of ischium, left, stage IV (HCC)    Chronic, continuous use of opioids    Colostomy in place [facility])    Colon cancer screening    Dyspepsia    Epigastric pain    Osteomyelitis of pelvic region ([facility] [address] )    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    Neurogenic bladder    Sepsis with hypotension ([facility] )    History of Clostridium difficile colitis    Hyperkalemia    Stage II pressure ulcer of buttock (HCC)    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, stage 3 (HCC)    S[facility] bowel obstruction) ([facility])    Sepsis ([facility])    Hypokalemia    Renal cyst    Other male erectile dysfunction    Prostate cancer screening       Objective   /82 (BP Location: Left arm, Patient Position: Sitting, Cuff Size: Adult)   Pulse 68     Physical Exam   Constitutional: He is oriented to person, place, and time  He appears well-developed and well-nourished  HENT:   Head: Normocephalic and atraumatic  Eyes: EOM are normal    Neck: Normal range of motion  Pulmonary/Chest: Effort normal    Abdominal: Soft  He exhibits no distension and no mass  There is no tenderness  There is no rebound and no guarding  Suprapubic tube in place, colostomy   Genitourinary:   Genitourinary Comments: Uncircumcised phallus, with iatrogenic hypospadias and some erythematous changes due to irritation    Testicles are descended bilaterally and within normal limits  Musculoskeletal:   Paraplegic and wheelchair  Right upper extremity compromised  Neurological: He is alert and oriented to person, place, and time  Skin: Skin is warm and dry  Psychiatric: He has a normal mood and affect   His behavior is normal  Judgment and thought content normal            Current Medications     Current Outpatient Medications:     ACCU-CHEK FASTCLIX LANCETS MISC, USE TWICE DAILY, Disp: 102 each, Rfl: 0    ACCU-CHEK SMARTVIEW test strip, TEST TWICE DAILY, Disp: 100 each, Rfl: 0    acetaminophen (TYLENOL) 325 mg tablet, Take 2 tablets (650 mg total) by mouth every 6 (six) hours as needed for mild pain, headaches or fever, Disp: 30 tablet, Rfl: 0    ASPIRIN LOW DOSE 81 MG EC tablet, TAKE 1 TABLET BY MOUTH EVERY DAY, Disp: 30 tablet, Rfl: 0    Disposable Gloves (LATEX GLOVES LARGE) MISC, Use as needed up to 3 times a day dispo 2 boxes, Disp: 2 each, Rfl: 11    ibuprofen (MOTRIN) 800 mg tablet, TAKE 1 TABLET(800 MG) BY MOUTH DAILY AS NEEDED FOR MODERATE PAIN, Disp: 60 tablet, Rfl: 0    Incontinence Supply Disposable (SUNBEAM INCONTINENT UNDER PAD) MISC, Use 1 per week, dispense 4 reusable underpads, Disp: 4 each, Rfl: 11    Incontinence Supply Disposable MISC, by Does not apply route 2 (two) times a day, Disp: 60 each, Rfl: 11    omeprazole (PriLOSEC) 20 mg delayed release capsule, Take 1 capsule (20 mg total) by mouth daily, Disp: 90 capsule, Rfl: 0    omeprazole (PriLOSEC) 40 MG capsule, TAKE 1 CAPSULE(40 MG) BY MOUTH DAILY, Disp: 90 capsule, Rfl: 0    oxyCODONE (ROXICODONE) 20 MG TABS, Take 1 tablet (20 mg total) by mouth 3 (three) times a dayMax Daily Amount: 60 mg, Disp: 90 tablet, Rfl: 0        Gee Blair MD

## 2020-02-25 ENCOUNTER — TELEPHONE (OUTPATIENT)
Dept: FAMILY MEDICINE CLINIC | Facility: CLINIC | Age: 60
End: 2020-02-25

## 2020-02-25 NOTE — TELEPHONE ENCOUNTER
Pt called to advise [employer] will not cover incontinence pads or latex gloves   I called pharmacy, they will not cover the request or delivery with Medicare  Patient also has medical assistance  He is going to call his insurance and call us back to let us know what DME supplier he will be using

## 2020-02-26 ENCOUNTER — TELEPHONE (OUTPATIENT)
Dept: FAMILY MEDICINE CLINIC | Facility: CLINIC | Age: 60
End: 2020-02-26

## 2020-02-26 ENCOUNTER — OFFICE VISIT (OUTPATIENT)
Dept: FAMILY MEDICINE CLINIC | Facility: CLINIC | Age: 60
End: 2020-02-26

## 2020-02-26 VITALS
RESPIRATION RATE: 18 BRPM | HEART RATE: 66 BPM | DIASTOLIC BLOOD PRESSURE: 74 MMHG | TEMPERATURE: 97.5 F | SYSTOLIC BLOOD PRESSURE: 116 MMHG | OXYGEN SATURATION: 98 %

## 2020-02-26 DIAGNOSIS — E11.9 TYPE 2 DIABETES MELLITUS WITHOUT COMPLICATION, WITHOUT LONG-TERM CURRENT USE OF INSULIN (HCC): ICD-10-CM

## 2020-02-26 DIAGNOSIS — N31.9 NEUROGENIC BLADDER: ICD-10-CM

## 2020-02-26 DIAGNOSIS — G82.20 PARAPLEGIA (HCC): ICD-10-CM

## 2020-02-26 DIAGNOSIS — G82.20 PARAPLEGIC SPINAL PARALYSIS (HCC): ICD-10-CM

## 2020-02-26 DIAGNOSIS — S88.911A AMPUTATED RIGHT LEG (HCC): Primary | ICD-10-CM

## 2020-02-26 DIAGNOSIS — N31.9 NEUROGENIC DYSFUNCTION OF THE URINARY BLADDER: ICD-10-CM

## 2020-02-26 DIAGNOSIS — L89.324 STAGE IV PRESSURE ULCER OF LEFT BUTTOCK (HCC): ICD-10-CM

## 2020-02-26 PROCEDURE — 3074F SYST BP LT 130 MM HG: CPT | Performed by: FAMILY MEDICINE

## 2020-02-26 PROCEDURE — 3078F DIAST BP <80 MM HG: CPT | Performed by: FAMILY MEDICINE

## 2020-02-26 PROCEDURE — 1036F TOBACCO NON-USER: CPT | Performed by: FAMILY MEDICINE

## 2020-02-26 PROCEDURE — 99213 OFFICE O/P EST LOW 20 MIN: CPT | Performed by: FAMILY MEDICINE

## 2020-02-26 RX ORDER — MEDICAL SUPPLY, MISCELLANEOUS
EACH MISCELLANEOUS
Qty: 2 EACH | Refills: 11 | Status: SHIPPED | OUTPATIENT
Start: 2020-02-26

## 2020-02-26 NOTE — ASSESSMENT & PLAN NOTE
Neurogenic bladder status post augmentation cystoplasty in 2014  As per Urology he is to continue with the suprapubic tube as he is dry and he recently had IPP explantation with sepsis   monthly suprapubic tube changes  Visiting nurses do the suprapubic tube changes

## 2020-02-26 NOTE — PROGRESS NOTES
Assessment/Plan:    Amputated right leg [facility])  Patient is s/p disarticulation at the hip  No new wounds on surgical site  Denies change in pain  Oxycodone as needed     Neurogenic bladder  Neurogenic bladder status post augmentation cystoplasty in 2014  As per Urology he is to continue with the suprapubic tube as he is dry and he recently had IPP explantation with sepsis   monthly suprapubic tube changes  Visiting nurses do the suprapubic tube changes      Paraplegic spinal paralysis ([address] )  Uses motorized wheelchair   Frequent repositioning at home        Diagnoses and all orders for this visit:    Amputated right leg (HCC)    Neurogenic dysfunction of the urinary bladder  -     Incontinence Supply Disposable MISC; by Does not apply route 2 (two) times a day  -     Incontinence Supply Disposable (SUNBEAM INCONTINENT UNDER PAD) MISC; Use 1 per week, dispense 4 reusable underpads  -     Disposable Gloves (LATEX GLOVES LARGE) MISC; Use as needed up to 3 times a day dispo 2 boxes    Paraplegia ([facility])  -     Incontinence Supply Disposable MISC; by Does not apply route 2 (two) times a day  -     Incontinence Supply Disposable (SUNBEAM INCONTINENT UNDER PAD) MISC; Use 1 per week, dispense 4 reusable underpads  -     Disposable Gloves (LATEX GLOVES LARGE) MISC; Use as needed up to 3 times a day dispo 2 boxes    Stage IV pressure ulcer of left buttock ([facility])  -     Incontinence Supply Disposable MISC; by Does not apply route 2 (two) times a day  -     Incontinence Supply Disposable (SUNBEAM INCONTINENT UNDER PAD) MISC; Use 1 per week, dispense 4 reusable underpads  -     Disposable Gloves (LATEX GLOVES LARGE) MISC; Use as needed up to 3 times a day dispo 2 boxes    Type 2 diabetes mellitus without complication, without long-term current use of insulin ([facility])  -     Glucometer  -     Disposable Gloves (LATEX GLOVES LARGE) MISC; Use as needed up to 3 times a day dispo 2 boxes    Neurogenic bladder    Paraplegic spinal paralysis [facility])          Subjective:      Patient ID: Fortunato Rodriguez is a 62 y o  male  63 yo  male with multiple medical comorbidities including paraplegic spinal paralysis with R leg disarticulation at the hip, left ischium wound which required skin flap  He had subsequently undergone penile prosthesis by Dr Charles Prasad  He underwent explantation of the prosthesis 11/4/2019 due to infection and erosion  Currently doing well  His main concern is being able to restart self catheterization at some point but is aware and agrees with Urology that he needs to give his body a chance to heal  After multiple recurrent inpatient stays he has not been back inpatient since he underwent explantation of the prosthesis 11/4/2019       The following portions of the patient's history were reviewed and updated as appropriate: He  has a past medical history of Anemia, Blind, Chronic cystitis, Colostomy in place [facility]), Detrusor sphincter dyssynergia, Diabetes mellitus ([address] ), Erectile dysfunction, Frequency of urination, GERD (gastroesophageal reflux disease), History of diabetes mellitus, History of osteomyelitis, leg amputation ([address] ), Hyperlipidemia, Hypertension, Hypospadias, Incomplete bladder emptying, 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([address] ) (9/16/2019), Neurogenic bladder, Paralysis ([address] ), Paraplegia ([address] ), Spinal cord cysts, Ulcer of sacral region [facility]), and Urge incontinence    He   Patient Active Problem List    Diagnosis Date Noted    Renal cyst 11/18/2019    Other male erectile dysfunction 11/18/2019    Prostate cancer screening 11/18/2019    Hypokalemia 10/29/2019    SBO (small bowel obstruction) ([address] ) 10/28/2019    Sepsis ([address] ) 10/28/2019    Stage II pressure ulcer of buttock ([address] ) 09/17/2019    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, stage 3 ([address] ) 09/16/2019    Hyperkalemia 09/14/2019    History of Clostridium difficile colitis 08/25/2019    Neurogenic bladder 08/23/2019    Sepsis with hypotension ([place] [address] ) 08/23/2019    Osteomyelitis of pelvic region [facility]) 05/06/2019    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    Colostomy in place [facility]) 04/23/2019    Colon cancer screening 04/23/2019    Dyspepsia 04/23/2019    Epigastric pain 04/23/2019    Chronic, continuous use of opioids 01/15/2019    Decubitus ulcer of ischium, left, stage IV ([place] [address] ) 11/12/2018    Diarrhea 09/21/2018    Amputated right leg ([place] [address] ) 07/18/2018    Anxiety 04/23/2018    GERD (gastroesophageal reflux disease)     History of osteomyelitis     Cyst of joint of shoulder 10/20/2016    Anemia 10/20/2016    Paraplegic spinal paralysis ([place] [address] ) 10/20/2016    Sinus tachycardia 10/20/2016    Stage IV pressure ulcer of sacral region ([place] [address] ) 10/15/2016    Stage IV pressure ulcer of left heel ([place] [address] ) 10/15/2016    Diabetes mellitus type II, controlled ([place] [address] ) 03/07/2014    Benign essential hypertension 07/31/2013     He  has a past surgical history that includes Spine surgery; Leg amputation; Bladder surgery; Colostomy; Amputation; pr adj tiss xfer scalp,extrem 10 1-30 sqcm (Left, 5/1/2017); pr muscle-skin flap,trunk (Left, 5/1/2017); Colon surgery; pr muscle-skin flap,trunk (Left, 9/27/2017); Complex cystometrogram (2014); Uroflowmetry simple / complex (2014); Cystoscopy (2014); Penile prosthesis implant (2011); Meatotomy; CT cystogram (9/19/2019); IR suprapubic tube (9/19/2019); IR PICC repo (9/23/2019); and Penile prosthesis removal (N/A, 11/1/2019)  His family history includes No Known Problems in his father and mother  He  reports that he quit smoking about 33 years ago  He has a 5 00 pack-year smoking history  He has never used smokeless tobacco  He reports that he does not drink alcohol or use drugs    Current Outpatient Medications   Medication Sig Dispense Refill    acetaminophen (TYLENOL) 325 mg tablet Take 2 tablets (650 mg total) by mouth every 6 (six) hours as needed for mild pain, headaches or fever 30 tablet 0    ASPIRIN LOW DOSE 81 MG EC tablet TAKE 1 TABLET BY MOUTH EVERY DAY 30 tablet 0    ibuprofen (MOTRIN) 800 mg tablet TAKE 1 TABLET(800 MG) BY MOUTH DAILY AS NEEDED FOR MODERATE PAIN 60 tablet 0    omeprazole (PriLOSEC) 40 MG capsule TAKE 1 CAPSULE(40 MG) BY MOUTH DAILY 90 capsule 0    oxyCODONE (ROXICODONE) 20 MG TABS Take 1 tablet (20 mg total) by mouth 3 (three) times a dayMax Daily Amount: 60 mg 90 tablet 0    ACCU-CHEK FASTCLIX LANCETS MISC USE TWICE DAILY (Patient not taking: Reported on 2/26/2020) 102 each 0    ACCU-CHEK SMARTVIEW test strip TEST TWICE DAILY (Patient not taking: Reported on 2/26/2020) 100 each 0    Disposable Gloves (LATEX GLOVES LARGE) MISC Use as needed up to 3 times a day dispo 2 boxes 2 each 11    Incontinence Supply Disposable (SUNBEAM INCONTINENT UNDER PAD) MISC Use 1 per week, dispense 4 reusable underpads 4 each 11    Incontinence Supply Disposable MISC by Does not apply route 2 (two) times a day 60 each 11    omeprazole (PriLOSEC) 20 mg delayed release capsule Take 1 capsule (20 mg total) by mouth daily (Patient not taking: Reported on 2/26/2020) 90 capsule 0     No current facility-administered medications for this visit        Current Outpatient Medications on File Prior to Visit   Medication Sig    acetaminophen (TYLENOL) 325 mg tablet Take 2 tablets (650 mg total) by mouth every 6 (six) hours as needed for mild pain, headaches or fever    ASPIRIN LOW DOSE 81 MG EC tablet TAKE 1 TABLET BY MOUTH EVERY DAY    ibuprofen (MOTRIN) 800 mg tablet TAKE 1 TABLET(800 MG) BY MOUTH DAILY AS NEEDED FOR MODERATE PAIN    omeprazole (PriLOSEC) 40 MG capsule TAKE 1 CAPSULE(40 MG) BY MOUTH DAILY    oxyCODONE (ROXICODONE) 20 MG TABS Take 1 tablet (20 mg total) by mouth 3 (three) times a dayMax Daily Amount: 60 mg    ACCU-CHEK FASTCLIX LANCETS MISC USE TWICE DAILY (Patient not taking: Reported on 2/26/2020)    ACCU-CHEK SMARTVIEW test strip TEST TWICE DAILY (Patient not taking: Reported on 2/26/2020)    omeprazole (PriLOSEC) 20 mg delayed release capsule Take 1 capsule (20 mg total) by mouth daily (Patient not taking: Reported on 2/26/2020)    [DISCONTINUED] Disposable Gloves (LATEX GLOVES LARGE) MISC Use as needed up to 3 times a day dispo 2 boxes (Patient not taking: Reported on 2/26/2020)    [DISCONTINUED] Incontinence Supply Disposable (SUNBEAM INCONTINENT UNDER PAD) MISC Use 1 per week, dispense 4 reusable underpads (Patient not taking: Reported on 2/26/2020)    [DISCONTINUED] Incontinence Supply Disposable MISC by Does not apply route 2 (two) times a day (Patient not taking: Reported on 2/26/2020)     No current facility-administered medications on file prior to visit       Review of Systems   Musculoskeletal: Positive for arthralgias and back pain  All other systems reviewed and are negative  Objective:      /74 (BP Location: Left arm, Patient Position: Sitting, Cuff Size: Standard)   Pulse 66   Temp 97 5 °F (36 4 °C) (Temporal)   Resp 18   SpO2 98%          Physical Exam   Constitutional: He appears well-developed  HENT:   Head: Normocephalic  Right Ear: External ear normal    Left Ear: External ear normal    Nose: Nose normal    Mouth/Throat: Oropharynx is clear and moist    Eyes: Pupils are equal, round, and reactive to light  Conjunctivae and EOM are normal    Neck: Normal range of motion  Neck supple  No thyromegaly present  Cardiovascular: Normal rate, regular rhythm and normal heart sounds  Pulmonary/Chest: Effort normal and breath sounds normal    Abdominal: Soft  There is no tenderness  There is no rebound and no guarding  Neurological: He has normal reflexes  Skin: Skin is dry  Psychiatric: He has a normal mood and affect  Nursing note and vitals reviewed

## 2020-02-26 NOTE — TELEPHONE ENCOUNTER
Faxed the script to [facility] supply for Incotinence supply,under pad,gloves  Confirmation was receive

## 2020-02-26 NOTE — ASSESSMENT & PLAN NOTE
Patient is s/p disarticulation at the hip  No new wounds on surgical site  Denies change in pain  Oxycodone as needed

## 2020-02-26 NOTE — TELEPHONE ENCOUNTER
Confirmed with Dr Jona Thomas that this was faxed manually  She confirmed Lester Agrawal / Harshad Dunn Assistant faxed Rx to [facility] and she received transmittal response "ok"  [address] [employer] and spoke with Ines Hazel who confirmed they received Rx  They will begin the process of opening account and account should be opened by tomorrow  Dr Joann Wall did advise me that one of the Rx is not specific as to what the incontinence supply actually is, it just says twice a day  He said most likely, that Rx will have to be fixed and faxed to them once again  Can you please review the Rx that says incontinence supply without mention of what the supply actually is and advise? Thanks so much

## 2020-02-27 ENCOUNTER — APPOINTMENT (OUTPATIENT)
Dept: WOUND CARE | Facility: HOSPITAL | Age: 60
End: 2020-02-27
Payer: MEDICARE

## 2020-02-27 PROCEDURE — 99212 OFFICE O/P EST SF 10 MIN: CPT

## 2020-02-27 PROCEDURE — 97597 DBRDMT OPN WND 1ST 20 CM/<: CPT

## 2020-03-04 ENCOUNTER — TELEPHONE (OUTPATIENT)
Dept: FAMILY MEDICINE CLINIC | Facility: CLINIC | Age: 60
End: 2020-03-04

## 2020-03-05 DIAGNOSIS — F11.90 CHRONIC, CONTINUOUS USE OF OPIOIDS: Chronic | ICD-10-CM

## 2020-03-05 RX ORDER — OXYCODONE HYDROCHLORIDE 20 MG/1
20 TABLET ORAL 3 TIMES DAILY
Qty: 90 TABLET | Refills: 0 | Status: SHIPPED | OUTPATIENT
Start: 2020-03-05 | End: 2020-04-03 | Stop reason: SDUPTHER

## 2020-04-03 DIAGNOSIS — F11.90 CHRONIC, CONTINUOUS USE OF OPIOIDS: Chronic | ICD-10-CM

## 2020-04-03 RX ORDER — OXYCODONE HYDROCHLORIDE 20 MG/1
20 TABLET ORAL 3 TIMES DAILY
Qty: 90 TABLET | Refills: 0 | Status: SHIPPED | OUTPATIENT
Start: 2020-04-03 | End: 2020-05-04 | Stop reason: SDUPTHER

## 2020-04-04 DIAGNOSIS — K21.9 GASTROESOPHAGEAL REFLUX DISEASE, ESOPHAGITIS PRESENCE NOT SPECIFIED: ICD-10-CM

## 2020-04-06 RX ORDER — OMEPRAZOLE 40 MG/1
CAPSULE, DELAYED RELEASE ORAL
Qty: 90 CAPSULE | Refills: 0 | Status: SHIPPED | OUTPATIENT
Start: 2020-04-06 | End: 2020-07-09

## 2020-04-21 ENCOUNTER — TELEPHONE (OUTPATIENT)
Dept: FAMILY MEDICINE CLINIC | Facility: CLINIC | Age: 60
End: 2020-04-21

## 2020-05-04 DIAGNOSIS — F11.90 CHRONIC, CONTINUOUS USE OF OPIOIDS: Chronic | ICD-10-CM

## 2020-05-04 RX ORDER — OXYCODONE HYDROCHLORIDE 20 MG/1
20 TABLET ORAL 3 TIMES DAILY
Qty: 90 TABLET | Refills: 0 | Status: SHIPPED | OUTPATIENT
Start: 2020-05-04 | End: 2020-06-03 | Stop reason: SDUPTHER

## 2020-05-12 DIAGNOSIS — G89.29 CHRONIC LOW BACK PAIN WITHOUT SCIATICA, UNSPECIFIED BACK PAIN LATERALITY: ICD-10-CM

## 2020-05-12 DIAGNOSIS — M54.50 CHRONIC LOW BACK PAIN WITHOUT SCIATICA, UNSPECIFIED BACK PAIN LATERALITY: ICD-10-CM

## 2020-05-12 RX ORDER — IBUPROFEN 800 MG/1
TABLET ORAL
Qty: 60 TABLET | Refills: 0 | Status: SHIPPED | OUTPATIENT
Start: 2020-05-12 | End: 2020-07-09

## 2020-05-12 RX ORDER — ASPIRIN 81 MG/1
TABLET, COATED ORAL
Qty: 30 TABLET | Refills: 0 | Status: SHIPPED | OUTPATIENT
Start: 2020-05-12 | End: 2021-12-02 | Stop reason: SDUPTHER

## 2020-05-15 ENCOUNTER — TELEPHONE (OUTPATIENT)
Dept: FAMILY MEDICINE CLINIC | Facility: CLINIC | Age: 60
End: 2020-05-15

## 2020-05-20 ENCOUNTER — TELEPHONE (OUTPATIENT)
Dept: FAMILY MEDICINE CLINIC | Facility: CLINIC | Age: 60
End: 2020-05-20

## 2020-05-22 ENCOUNTER — TELEPHONE (OUTPATIENT)
Dept: FAMILY MEDICINE CLINIC | Facility: CLINIC | Age: 60
End: 2020-05-22

## 2020-05-22 DIAGNOSIS — E11.9 TYPE 2 DIABETES MELLITUS WITHOUT COMPLICATION, WITHOUT LONG-TERM CURRENT USE OF INSULIN (HCC): Primary | ICD-10-CM

## 2020-05-22 DIAGNOSIS — E11.9 TYPE 2 DIABETES MELLITUS WITHOUT COMPLICATION, WITHOUT LONG-TERM CURRENT USE OF INSULIN (HCC): ICD-10-CM

## 2020-05-22 RX ORDER — BLOOD-GLUCOSE METER
EACH MISCELLANEOUS DAILY
Qty: 1 KIT | Refills: 0 | Status: SHIPPED | OUTPATIENT
Start: 2020-05-22 | End: 2021-05-13 | Stop reason: ALTCHOICE

## 2020-05-22 RX ORDER — LANCETS
EACH MISCELLANEOUS DAILY
Qty: 100 EACH | Refills: 2 | Status: SHIPPED | OUTPATIENT
Start: 2020-05-22 | End: 2020-05-27

## 2020-05-26 ENCOUNTER — TELEPHONE (OUTPATIENT)
Dept: UROLOGY | Facility: MEDICAL CENTER | Age: 60
End: 2020-05-26

## 2020-05-26 ENCOUNTER — TELEPHONE (OUTPATIENT)
Dept: FAMILY MEDICINE CLINIC | Facility: CLINIC | Age: 60
End: 2020-05-26

## 2020-05-27 RX ORDER — LANCETS
EACH MISCELLANEOUS
Qty: 306 EACH | Refills: 2 | Status: SHIPPED | OUTPATIENT
Start: 2020-05-27 | End: 2021-05-24 | Stop reason: SDUPTHER

## 2020-05-28 DIAGNOSIS — E11.9 TYPE 2 DIABETES MELLITUS WITHOUT COMPLICATION, WITHOUT LONG-TERM CURRENT USE OF INSULIN (HCC): Primary | ICD-10-CM

## 2020-05-28 RX ORDER — LANCETS 33 GAUGE
EACH MISCELLANEOUS DAILY
Qty: 1 EACH | Refills: 0 | Status: SHIPPED | OUTPATIENT
Start: 2020-05-28 | End: 2021-02-18 | Stop reason: SDUPTHER

## 2020-05-28 RX ORDER — BLOOD-GLUCOSE METER
EACH MISCELLANEOUS DAILY
Qty: 100 EACH | Refills: 3 | Status: SHIPPED | OUTPATIENT
Start: 2020-05-28

## 2020-06-02 ENCOUNTER — APPOINTMENT (OUTPATIENT)
Dept: LAB | Facility: HOSPITAL | Age: 60
End: 2020-06-02
Attending: UROLOGY
Payer: MEDICARE

## 2020-06-02 DIAGNOSIS — R97.20 ELEVATED PSA: ICD-10-CM

## 2020-06-02 LAB — PSA SERPL-MCNC: 4.4 NG/ML (ref 0–4)

## 2020-06-02 PROCEDURE — 84153 ASSAY OF PSA TOTAL: CPT

## 2020-06-02 PROCEDURE — 36415 COLL VENOUS BLD VENIPUNCTURE: CPT

## 2020-06-03 DIAGNOSIS — F11.90 CHRONIC, CONTINUOUS USE OF OPIOIDS: Chronic | ICD-10-CM

## 2020-06-03 RX ORDER — OXYCODONE HYDROCHLORIDE 20 MG/1
20 TABLET ORAL 3 TIMES DAILY
Qty: 90 TABLET | Refills: 0 | Status: SHIPPED | OUTPATIENT
Start: 2020-06-03 | End: 2020-07-02 | Stop reason: SDUPTHER

## 2020-06-08 ENCOUNTER — TELEPHONE (OUTPATIENT)
Dept: FAMILY MEDICINE CLINIC | Facility: CLINIC | Age: 60
End: 2020-06-08

## 2020-06-08 RX ORDER — HYOSCYAMINE SULFATE 16 OZ
SOLUTION MISCELLANEOUS
COMMUNITY
Start: 2020-05-22 | End: 2021-06-24

## 2020-06-11 ENCOUNTER — APPOINTMENT (OUTPATIENT)
Dept: WOUND CARE | Facility: HOSPITAL | Age: 60
End: 2020-06-11
Payer: MEDICARE

## 2020-06-11 PROCEDURE — 99213 OFFICE O/P EST LOW 20 MIN: CPT

## 2020-06-17 ENCOUNTER — TELEPHONE (OUTPATIENT)
Dept: FAMILY MEDICINE CLINIC | Facility: CLINIC | Age: 60
End: 2020-06-17

## 2020-07-02 DIAGNOSIS — F11.90 CHRONIC, CONTINUOUS USE OF OPIOIDS: Chronic | ICD-10-CM

## 2020-07-07 ENCOUNTER — TELEPHONE (OUTPATIENT)
Dept: FAMILY MEDICINE CLINIC | Facility: CLINIC | Age: 60
End: 2020-07-07

## 2020-07-07 RX ORDER — OXYCODONE HYDROCHLORIDE 20 MG/1
20 TABLET ORAL 3 TIMES DAILY
Qty: 90 TABLET | Refills: 0 | Status: SHIPPED | OUTPATIENT
Start: 2020-07-07 | End: 2020-08-03 | Stop reason: SDUPTHER

## 2020-07-07 NOTE — TELEPHONE ENCOUNTER
Pt is requesting update on medication   oxyCODONE (ROXICODONE) 20 MG TABS ()   Pt states that he has a few day calling for the same reason

## 2020-07-08 DIAGNOSIS — K21.9 GASTROESOPHAGEAL REFLUX DISEASE, ESOPHAGITIS PRESENCE NOT SPECIFIED: ICD-10-CM

## 2020-07-08 DIAGNOSIS — G89.29 CHRONIC LOW BACK PAIN WITHOUT SCIATICA, UNSPECIFIED BACK PAIN LATERALITY: ICD-10-CM

## 2020-07-08 DIAGNOSIS — M54.50 CHRONIC LOW BACK PAIN WITHOUT SCIATICA, UNSPECIFIED BACK PAIN LATERALITY: ICD-10-CM

## 2020-07-09 ENCOUNTER — APPOINTMENT (OUTPATIENT)
Dept: WOUND CARE | Facility: HOSPITAL | Age: 60
End: 2020-07-09
Payer: MEDICARE

## 2020-07-09 PROCEDURE — 99213 OFFICE O/P EST LOW 20 MIN: CPT

## 2020-07-09 RX ORDER — IBUPROFEN 800 MG/1
TABLET ORAL
Qty: 60 TABLET | Refills: 0 | Status: SHIPPED | OUTPATIENT
Start: 2020-07-09 | End: 2020-09-04

## 2020-07-09 RX ORDER — OMEPRAZOLE 40 MG/1
CAPSULE, DELAYED RELEASE ORAL
Qty: 90 CAPSULE | Refills: 0 | Status: SHIPPED | OUTPATIENT
Start: 2020-07-09 | End: 2021-02-18 | Stop reason: SDUPTHER

## 2020-07-22 ENCOUNTER — TELEPHONE (OUTPATIENT)
Dept: FAMILY MEDICINE CLINIC | Facility: CLINIC | Age: 60
End: 2020-07-22

## 2020-07-22 ENCOUNTER — TELEMEDICINE (OUTPATIENT)
Dept: FAMILY MEDICINE CLINIC | Facility: CLINIC | Age: 60
End: 2020-07-22

## 2020-07-22 VITALS — SYSTOLIC BLOOD PRESSURE: 126 MMHG | DIASTOLIC BLOOD PRESSURE: 82 MMHG | TEMPERATURE: 97.9 F | HEART RATE: 78 BPM

## 2020-07-22 DIAGNOSIS — S88.911A AMPUTATED RIGHT LEG (HCC): ICD-10-CM

## 2020-07-22 DIAGNOSIS — E46 PROTEIN MALNUTRITION (HCC): Primary | ICD-10-CM

## 2020-07-22 PROCEDURE — G2025 DIS SITE TELE SVCS RHC/FQHC: HCPCS | Performed by: FAMILY MEDICINE

## 2020-07-22 NOTE — TELEPHONE ENCOUNTER
Prosthetic order, demo sheet, and office notes faxed to [facility] ([phone])  Office to contact patient for appointment  Patient also given office number to call if wait becomes too long to hear from clinic

## 2020-07-22 NOTE — PROGRESS NOTES
Virtual Brief Visit    Assessment/Plan:    Problem List Items Addressed This Visit     None      Visit Diagnoses     Protein malnutrition ([address] )    -  Primary    Relevant Medications    Nutritional Supplements ([address]) LIQD                Reason for visit is   Chief Complaint   Patient presents with    Diabetes     f/u dm         Encounter provider Adelita Goldberg, MD    Provider located at [address] [place] [address]  [address] [address] [phone]    Recent Visits  No visits were found meeting these conditions  Showing recent visits within past 7 days and meeting all other requirements     Today's Visits  Date Type Provider Dept   07/22/20 Teena Jaimes MD  [facility]   Showing today's visits and meeting all other requirements     Future Appointments  No visits were found meeting these conditions  Showing future appointments within next 150 days and meeting all other requirements        After connecting through telephone, the patient was identified by name and date of birth  Toney Ramírez was informed that this is a telemedicine visit and that the visit is being conducted through telephone  My office door was closed  No one else was in the room  He acknowledged consent and understanding of privacy and security of the platform  The patient has agreed to participate and understands he can discontinue the visit at any time  Patient is aware this is a billable service  Jackson Branch is a 62 y o  male   61 yo  male with multiple medical comorbidities including paraplegic spinal paralysis with R leg disarticulation at the hip, left ischium wound which required skin flap  He had subsequently undergone penile prosthesis by Dr Selina Garcia  He underwent explantation of the prosthesis 11/4/2019 due to infection and erosion  Currently doing well   His main concern is being able to restart self catheterization at some point but is aware and agrees with Urology that he needs to give his body a chance to heal  After multiple recurrent inpatient stays he has not been back inpatient since he underwent explantation of the prosthesis 11/4/2019  He is requesting a refill of Glucerna and would like to be referred for a prosthesis for his R leg          Past Medical History:   Diagnosis Date    Anemia     Blind     r eye    Chronic cystitis     Colostomy in place [facility])     Detrusor sphincter dyssynergia     Diabetes mellitus ([facility] )     Poorly controlled type 2; Last Assessed:  3/18/14    Erectile dysfunction     Frequency of urination     GERD (gastroesophageal reflux disease)     History of diabetes mellitus     History of osteomyelitis     Hx of leg amputation ([facility])     r high upper leg    Hyperlipidemia     Hypertension     Hypospadias     Incomplete bladder emptying     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([facility] ) 9/16/2019    Neurogenic bladder     Paralysis ([facility] )     Paraplegia ([facility])     Spinal cord cysts     Ulcer of sacral region ([facility] )     Urge incontinence        Past Surgical History:   Procedure Laterality Date    AMPUTATION      At hip; Last Assessed:  1/19/16    BLADDER SURGERY      COLON SURGERY      llq ostomy pouch    COLOSTOMY      COMPLEX CYSTOMETROGRAM  2014    CT CYSTOGRAM  9/19/2019    CYSTOSCOPY  2014    IR PICC REPO  9/23/2019    IR SUPRAPUBIC TUBE  9/19/2019    LEG AMPUTATION      MEATOTOMY      PENILE PROSTHESIS  REMOVAL N/A 11/1/2019    Procedure: REMOVAL PROSTHESIS PENILE;  Surgeon: Marnie Dorado MD;  Location: [facility];  Service: Urology    PENILE PROSTHESIS IMPLANT  2011    DC ADJ TISS XFER SCALP,EXTREM 10 1-30 SQCM Left 5/1/2017    Procedure: POSTERIOR THIGH V-Y ADMANCEMENT;  Surgeon: Kimo King MD;  [facility];  Service: Plastics    DC MUSCLE-SKIN FLAP,TRUNK Left 5/1/2017    Procedure: FLAP CLOSURE LEFT ISCHIAL WOUND and "RIGHT" ISCHIAL FLAP ADVANCEMENT * DEBRIDEMENT, [place] ;  Surgeon: Abimael Velasquez MD;  [facility];  Service: Plastics    NH MUSCLE-SKIN FLAP,TRUNK Left 9/27/2017    Procedure: gluteal myocutaneous rotational flap, posterior thigh v to y advancement- wound 5 x 2 5 x 8;  Surgeon: Abimael Velasquez MD;  [facility];  Service: Plastics    SPINE SURGERY      Lower back    UROFLOWMETRY SIMPLE / COMPLEX  2014       Current Outpatient Medications   Medication Sig Dispense Refill    Accu-Chek FastClix Lancets MISC USE AS DIRECTED 306 each 2    Blood Glucose Monitoring Suppl (ACCU-CHEK OSEI SMARTVIEW) w/Device KIT by Does not apply route daily 1 kit 0    Blood Glucose Monitoring Suppl (ONE TOUCH ULTRA 2) w/Device KIT by Does not apply route daily 100 each 3    Disposable Gloves (LATEX GLOVES LARGE) MISC Use as needed up to 3 times a day dispo 2 boxes 2 each 11    glucose blood (OneTouch Ultra) test strip 1 each by Other route daily Test daily 100 each 3    ibuprofen (MOTRIN) 800 mg tablet TAKE 1 TABLET(800 MG) BY MOUTH DAILY AS NEEDED FOR MODERATE PAIN 60 tablet 0    Incontinence Supply Disposable (SUNBEAM INCONTINENT UNDER PAD) MISC Use 1 per week, dispense 4 reusable underpads 4 each 11    Incontinence Supply Disposable MISC by Does not apply route 2 (two) times a day 60 each 11    omeprazole (PriLOSEC) 40 MG capsule TAKE 1 CAPSULE(40 MG) BY MOUTH DAILY 90 capsule 0    OneTouch Delica Lancets 39E MISC by Does not apply route daily 1 each 0    oxyCODONE (ROXICODONE) 20 MG TABS Take 1 tablet (20 mg total) by mouth 3 (three) times a dayMax Daily Amount: 60 mg 90 tablet 0    ACCU-CHEK FASTCLIX LANCETS MISC USE TWICE DAILY (Patient not taking: Reported on 2/26/2020) 102 each 0    ACCU-CHEK SMARTVIEW test strip TEST TWICE DAILY (Patient not taking: Reported on 2/26/2020) 100 each 0    acetaminophen (TYLENOL) 325 mg tablet Take 2 tablets (650 mg total) by mouth every 6 (six) hours as needed for mild pain, headaches or fever (Patient not taking: Reported on 7/22/2020) 30 tablet 0    ASPIRIN LOW DOSE 81 MG EC tablet TAKE 1 TABLET BY MOUTH EVERY DAY (Patient not taking: Reported on 7/22/2020) 30 tablet 0    Nutritional Supplements (GLUCERNA SHAKE) LIQD Take 3 Cans by mouth 3 (three) times a day 90 Can 11    omeprazole (PriLOSEC) 20 mg delayed release capsule Take 1 capsule (20 mg total) by mouth daily (Patient not taking: Reported on 2/26/2020) 90 capsule 0    SODIUM HYPOCHLORITE 0 25 percent USE UTD       No current facility-administered medications for this visit  No Known Allergies    Review of Systems   Musculoskeletal: Positive for arthralgias and back pain  All other systems reviewed and are negative  Vitals:    07/22/20 0949   BP: 126/82   BP Location: Right arm   Patient Position: Sitting   Pulse: 78   Temp: 97 9 °F (36 6 °C)         I spent 17 minutes directly with the patient during this visit    VIRTUAL VISIT [address] [place] [address] acknowledges that he has consented to an online visit or consultation  He understands that the online visit is based solely on information provided by him, and that, in the absence of a face-to-face physical evaluation by the physician, the diagnosis he receives is both limited and provisional in terms of accuracy and completeness  This is not intended to replace a full medical face-to-face evaluation by the physician  Michael Arguello understands and accepts these terms  It was my intent to perform this visit via video technology but the patient was not able to do a video connection so the visit was completed via audio telephone only

## 2020-07-30 ENCOUNTER — OFFICE VISIT (OUTPATIENT)
Dept: UROLOGY | Facility: MEDICAL CENTER | Age: 60
End: 2020-07-30
Payer: MEDICARE

## 2020-07-30 VITALS — HEART RATE: 62 BPM | DIASTOLIC BLOOD PRESSURE: 82 MMHG | SYSTOLIC BLOOD PRESSURE: 132 MMHG

## 2020-07-30 DIAGNOSIS — R97.20 ELEVATED PSA: Primary | ICD-10-CM

## 2020-07-30 PROCEDURE — 1036F TOBACCO NON-USER: CPT | Performed by: UROLOGY

## 2020-07-30 PROCEDURE — 99213 OFFICE O/P EST LOW 20 MIN: CPT | Performed by: UROLOGY

## 2020-07-30 PROCEDURE — 3079F DIAST BP 80-89 MM HG: CPT | Performed by: UROLOGY

## 2020-07-30 PROCEDURE — 3075F SYST BP GE 130 - 139MM HG: CPT | Performed by: UROLOGY

## 2020-07-30 NOTE — PROGRESS NOTES
[address] [facility]  [facility]  [address] [facility]  [place], [address] [place] [address]  [phone]  www  [facility]  org      NAME: Rikki Arguello  AGE: 62 y o  SEX: male  : 1960   MRN: [id number]    DATE: 2020  TIME: 11:08 AM    Assessment and Plan :    Elevated PSA:  PSA is stable  He cannot tolerate an MRI due to anxiety  However I feel that his PSA may be elevated due to the presence of the suprapubic tube and possible chronic inflammation as much as it could be cancer  He still has a healing wound perineally and in the sacrum so I wish to avoid a biopsy until this is completely healed up  Recheck PSA in 6 months  Neurogenic bladder with suprapubic tube status post bladder augmentation: We will wait for him to resume self-catheterizations and continue with suprapubic tube until his wounds have completely closed  I am afraid any interventions that we do now may cause him to wind up in the hospital again  Chief Complaint     Chief Complaint   Patient presents with    Elevated PSA       History of Present Illness   Follow-up elevated PSA:  PSA is 4 4 as of 2020 and was exactly the same at 4 4 2019  Neurogenic bladder status post bladder augmentation by me in the years past - he has a suprapubic tube in and is status post explantation of penile prosthesis due to infection  He has been out of the hospital now for the longest time in memory  He still has a sacral decubitus which has been a major problem along with the explantation of the penile prosthesis and he was inquiring about having removed the suprapubic tube and resuming self catheterization        The following portions of the patient's history were reviewed and updated as appropriate: allergies, current medications, past family history, past medical history, past social history, past surgical history and problem list   Past Medical History:   Diagnosis Date    Anemia  Blind     r eye    Chronic cystitis     Colostomy in place [facility])     Detrusor sphincter dyssynergia     Diabetes mellitus ([facility] )     Poorly controlled type 2; Last Assessed:  3/18/14    Erectile dysfunction     Frequency of urination     GERD (gastroesophageal reflux disease)     History of diabetes mellitus     History of osteomyelitis     Hx of leg amputation (HCC)     r high upper leg    Hyperlipidemia     Hypertension     Hypospadias     Incomplete bladder emptying     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([facility] ) 9/16/2019    Neurogenic bladder     Paralysis ([facility] )     [facility]aplegia ([facility])     Spinal cord cysts     Ulcer of sacral region ([facility] )     Urge incontinence      Past Surgical History:   Procedure Laterality Date    AMPUTATION      At hip; Last Assessed:  1/19/16    BLADDER SURGERY      COLON SURGERY      llq ostomy pouch    COLOSTOMY      COMPLEX CYSTOMETROGRAM  2014    CT CYSTOGRAM  9/19/2019    CYSTOSCOPY  2014    IR PICC REPO  9/23/2019    IR SUPRAPUBIC TUBE  9/19/2019    LEG AMPUTATION      MEATOTOMY      PENILE PROSTHESIS  REMOVAL N/A 11/1/2019    Procedure: REMOVAL PROSTHESIS PENILE;  Surgeon: Ludwin Henderson MD;  Location: [facility];  Service: Urology    PENILE PROSTHESIS IMPLANT  2011    AL ADJ TISS XFER SCALP,EXTREM 10 1-30 SQCM Left 5/1/2017    Procedure: POSTERIOR THIGH V-Y ADMANCEMENT;  Surgeon: Yajaira Long MD;  [facility];  Service: Plastics    AL MUSCLE-SKIN FLAP,TRUNK Left 5/1/2017    Procedure: FLAP CLOSURE LEFT ISCHIAL WOUND and "RIGHT" ISCHIAL FLAP ADVANCEMENT * DEBRIDEMENT, VAC PLACEMENT ;  Surgeon: Yajaira Long MD;  [facility];  Service: Plastics    AL MUSCLE-SKIN FLAP,TRUNK Left 9/27/2017    Procedure: gluteal myocutaneous rotational flap, posterior thigh v to y advancement- wound 5 x 2 5 x 8;  Surgeon: Yajaira Long MD;  [facility];  Service: Plastics    SPINE SURGERY      Lower back    UROFLOWMETRY SIMPLE / COMPLEX  2014 shoulder  Review of Systems   Review of Systems   Constitutional: Negative for fever  Gastrointestinal:        Has colostomy   Genitourinary:        Suprapubic tube       Active Problem List     Patient Active Problem List   Diagnosis    [facility]    Stage IV pressure ulcer [facility] left heel ([facility])    Cyst of joint of shoulder    Anemia    Paraplegic spinal paralysis [facility]    Sinus tachycardia    GERD (gastroesophageal reflux disease)    History of osteomyelitis    Anxiety    Amputated right leg ([facility])    Benign essential hypertension    Diabetes mellitus type II, controlled ([facility] )    Diarrhea    Decubitus ulcer of ischium, left, stage IV (HCC)    Chronic, continuous use of opioids    Colostomy in place [facility])    Colon cancer screening    Dyspepsia    Epigastric pain    Osteomyelitis of pelvic region ([facility] )    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[address] )    Neurogenic bladder    Sepsis with hypotension ([facility])    History of Clostridium difficile colitis    Hyperkalemia    Stage II pressure ulcer of buttock (HCC)    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, stage 3 ([facility])    SBO (small bowel obstruction) ([facility])    Sepsis ([facility])    Hypokalemia    Renal cyst    Other male erectile dysfunction    Prostate cancer screening       Objective   /82   Pulse 62     Physical Exam   Constitutional: He is oriented to person, place, and time  He appears well-developed and well-nourished  HENT:   Head: Normocephalic and atraumatic  Pulmonary/Chest: Effort normal    Abdominal: Soft  Colostomy, SP tube in place   Genitourinary: Penis normal    Genitourinary Comments: Normal uncircumcised phallus, testicles are descended bilaterally, no sign of infection   Musculoskeletal:   Wheelchair bound, paraplegia   Neurological: He is alert and oriented to person, place, and time  Psychiatric: He has a normal mood and affect   His behavior is normal  Judgment and thought content normal            Current Medications     Current Outpatient Medications:     Accu-Chek FastClix Lancets MISC, USE AS DIRECTED, Disp: 306 each, Rfl: 2    Blood Glucose Monitoring Suppl (ACCU-CHEK OSEI SMARTVIEW) w/Device KIT, by Does not apply route daily, Disp: 1 kit, Rfl: 0    Blood Glucose Monitoring Suppl (ONE TOUCH ULTRA 2) w/Device KIT, by Does not apply route daily, Disp: 100 each, Rfl: 3    Disposable Gloves (LATEX GLOVES LARGE) MISC, Use as needed up to 3 times a day dispo 2 boxes, Disp: 2 each, Rfl: 11    glucose blood (OneTouch Ultra) test strip, 1 each by Other route daily Test daily, Disp: 100 each, Rfl: 3    ibuprofen (MOTRIN) 800 mg tablet, TAKE 1 TABLET(800 MG) BY MOUTH DAILY AS NEEDED FOR MODERATE PAIN, Disp: 60 tablet, Rfl: 0    Incontinence Supply Disposable (SUNBEAM INCONTINENT UNDER PAD) MISC, Use 1 per week, dispense 4 reusable underpads, Disp: 4 each, Rfl: 11    Incontinence Supply Disposable MISC, by Does not apply route 2 (two) times a day, Disp: 60 each, Rfl: 11    Nutritional Supplements (GLUCERNA SHAKE) LIQD, Take 3 Cans by mouth 3 (three) times a day, Disp: 90 Can, Rfl: 11    omeprazole (PriLOSEC) 40 MG capsule, TAKE 1 CAPSULE(40 MG) BY MOUTH DAILY, Disp: 90 capsule, Rfl: 0    OneTouch Delica Lancets 69P MISC, by Does not apply route daily, Disp: 1 each, Rfl: 0    oxyCODONE (ROXICODONE) 20 MG TABS, Take 1 tablet (20 mg total) by mouth 3 (three) times a dayMax Daily Amount: 60 mg, Disp: 90 tablet, Rfl: 0    SODIUM HYPOCHLORITE 0 25 percent, USE UTD, Disp: , Rfl:     ACCU-CHEK FASTCLIX LANCETS MISC, USE TWICE DAILY (Patient not taking: Reported on 2/26/2020), Disp: 102 each, Rfl: 0    ACCU-CHEK SMARTVIEW test strip, TEST TWICE DAILY (Patient not taking: Reported on 2/26/2020), Disp: 100 each, Rfl: 0    acetaminophen (TYLENOL) 325 mg tablet, Take 2 tablets (650 mg total) by mouth every 6 (six) hours as needed for mild pain, headaches or fever (Patient not taking: Reported on 7/22/2020), Disp: 30 tablet, Rfl: 0    ASPIRIN LOW DOSE 81 MG EC tablet, TAKE 1 TABLET BY MOUTH EVERY DAY (Patient not taking: Reported on 7/22/2020), Disp: 30 tablet, Rfl: 0    omeprazole (PriLOSEC) 20 mg delayed release capsule, Take 1 capsule (20 mg total) by mouth daily (Patient not taking: Reported on 2/26/2020), Disp: 90 capsule, Rfl: 0        Joelle Morgan MD

## 2020-08-03 DIAGNOSIS — F11.90 CHRONIC, CONTINUOUS USE OF OPIOIDS: Chronic | ICD-10-CM

## 2020-08-05 DIAGNOSIS — F11.90 CHRONIC, CONTINUOUS USE OF OPIOIDS: Chronic | ICD-10-CM

## 2020-08-06 RX ORDER — OXYCODONE HYDROCHLORIDE 20 MG/1
20 TABLET ORAL 3 TIMES DAILY
Qty: 90 TABLET | Refills: 0 | OUTPATIENT
Start: 2020-08-06 | End: 2020-09-05

## 2020-08-06 RX ORDER — OXYCODONE HYDROCHLORIDE 20 MG/1
20 TABLET ORAL 3 TIMES DAILY
Qty: 90 TABLET | Refills: 0 | Status: SHIPPED | OUTPATIENT
Start: 2020-08-06 | End: 2020-09-04 | Stop reason: SDUPTHER

## 2020-08-26 ENCOUNTER — OFFICE VISIT (OUTPATIENT)
Dept: WOUND CARE | Facility: HOSPITAL | Age: 60
End: 2020-08-26
Payer: MEDICARE

## 2020-08-26 VITALS
TEMPERATURE: 97.1 F | RESPIRATION RATE: 18 BRPM | SYSTOLIC BLOOD PRESSURE: 100 MMHG | DIASTOLIC BLOOD PRESSURE: 70 MMHG | HEART RATE: 75 BPM

## 2020-08-26 DIAGNOSIS — L89.154 STAGE IV PRESSURE ULCER OF SACRAL REGION (HCC): Primary | Chronic | ICD-10-CM

## 2020-08-26 DIAGNOSIS — L89.223 STAGE III PRESSURE ULCER OF LEFT HIP (HCC): ICD-10-CM

## 2020-08-26 PROCEDURE — 99213 OFFICE O/P EST LOW 20 MIN: CPT | Performed by: FAMILY MEDICINE

## 2020-08-26 NOTE — PATIENT INSTRUCTIONS
Orders Placed This Encounter   Procedures    Wound cleansing and dressings     Left buttock    WOUND CLEANSING & DRESSINGS  Cleanse/Irrigate wound with Normal Saline  - Please ensure the tunnel at 12 oclock and at 4 oclock are irrigated well with NSS  Cleanse gaby-wound skin with pH balanced soap and water  Apply primary dressing:  Pack with saline moistened gauze  IF wound starts to look worse resume dakins packing    Apply secondary dressing: - 4x4s, ABD  Secure with: - medfix tape  Change dressing Daily & prn  Wound Left Hip    WOUND CLEANSING & DRESSINGS  Cleanse/Irrigate wound with Normal Saline  Do not get dressing wet  Apply primary dressing: - dermagran gauze  Apply secondary dressing: - 4x4 gauze and ABD  Secure with: - medfix tape  Change dressing Daily & prn     HOME CARE  Continue home care services/skilled nursing - 3 x per week  Open, draining complex wounds    Additional Orders:    OFF-LOADING  Avoid pressure at wound site  - all wounds, including healed L medial knee  Wheelchair Cushion  - ROHO cushion  Do Not Sit for Long Periods of Time  - 1 hr max for meals only, otherwise in bed and turn side to side at least every 3 hours or more frequently  Off Loading Mattress - air mattress    FOLLOW-UP APPOINTMENTS  Return Appointment in: - 4 weeks  Other: - please notify wound center of surgery date when you get it    MISC/ADDITIONAL ORDERS  Continue all home medications unless otherwise instructed  Increase dietary Protein intake - Increase your protein with food: chicken, beef, beans, cottage cheese, nuts    May try Ryan to help increase protein, sample protein supplements were given to you today, you can look up this product on [address]         This treatment was performed in the office today     Standing Status:   Future     Standing Expiration Date:   8/26/2021

## 2020-08-26 NOTE — PROGRESS NOTES
Patient ID: Jacqualin Schirmer is a 62 y o  male Date of Birth 1960       Chief Complaint   Patient presents with    Follow Up Wound Care Visit       Allergies  Patient has no known allergies  Assessment & Plan:  1  Stage IV pressure ulcer of sacral region ([address] )  -     Wound cleansing and dressings; Future    2  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)  -     Wound cleansing and dressings; Future       Improving stage III pressure ulcer of the left hip  Clean and granular  The stage IV pressure ulcer of the sacrum is minimally improved but undermining and depth is still significant  No signs of infection  Minimal fibrin  No debridement  Subjective:   Patient returns to the wound center for further evaluation of his stage IV pressure ulcer of the sacrum and stage III pressure ulcer of his hip  He is palliative care  Patient is paraplegic  Current treatment includes saline dressings  Dermagran to the hip ulcer  He has all the offloading devices  He denies any fever, chills or cough  Nothing else is change for him  His pain is unchanged  Is neuropathic        The following portions of the patient's history were reviewed and updated as appropriate:   Patient Active Problem List   Diagnosis    Stage IV pressure ulcer of sacral region ([address] )    Stage IV pressure ulcer of left heel ([address] )    Cyst of joint of shoulder    Anemia    Paraplegic spinal paralysis ([address] )    Sinus tachycardia    GERD (gastroesophageal reflux disease)    History of osteomyelitis    Anxiety    Amputated right leg (HCC)    Benign essential hypertension    Diabetes mellitus type II, controlled ([address] )    Diarrhea    Decubitus ulcer of ischium, left, stage IV (HCC)    Chronic, continuous use of opioids    Colostomy in place [facility])    Colon cancer screening    Dyspepsia    Epigastric pain    Osteomyelitis of pelvic region ([address] )    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[address] )    Neurogenic bladder    [facility] hypotension [facility]    History of Clostridium difficile colitis    Hyperkalemia    Stage II pressure ulcer of buttock ([facility])    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, stage 3 (HCC)    SBO (small bowel obstruction) ([facility] )    Sepsis ([facility] )    Hypokalemia    Renal cyst    Other male erectile dysfunction    Prostate cancer screening     Past Medical History:   Diagnosis Date    Anemia     Blind     r eye    Chronic cystitis     Col[facility] in place [facility])     Detrusor sphincter dyssynergia     Diabetes mellitus ([facility] )     Poorly controlled type 2; Last Assessed:  3/18/14    Erectile dysfunction     Frequency of urination     GERD (gastroesophageal reflux disease)     History of diabetes mellitus     History of osteomyelitis     Hx of leg amputation ([facility])     r high upper leg    Hyperlipidemia     Hypertension     Hypospadias     Incomplete bladder emptying     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([facility] ) 9/16/2019    Neurogenic bladder     Paralysis ([facility]     Paraplegia ([facility])     Spinal cord cysts     Ulcer of sacral region ([facility] )     Urge incontinence      Past Surgical History:   Procedure Laterality Date    AMPUTATION      At hip; Last Assessed:  1/19/16    BLADDER SURGERY      COLON SURGERY      llq ostomy pouch    COLOSTOMY      COMPLEX CYSTOMETROGRAM  2014    CT CYSTOGRAM  9/19/2019    CYSTOSCOPY  2014    IR PICC LINE REPOSITION  9/23/2019    IR SUPRAPUBIC CATHETER PLACEMENT  9/19/2019    LEG AMPUTATION      MEATOTOMY      PENILE PROSTHESIS  REMOVAL N/A 11/1/2019    Procedure: REMOVAL PROSTHESIS PENILE;  Surgeon: Robin Elaine MD;  Location: [facility];  Service: Urology    PENILE PROSTHESIS IMPLANT  2011    IA ADJ TISS XFER SCALP,EXTREM 10 1-30 SQCM Left 5/1/2017    Procedure: POSTERIOR THIGH V-Y ADMANCEMENT;  Surgeon: Gloria Jovel MD;  [facility];  Service: Plastics    IA MUSCLE-SKIN FLAP,TRUNK Left 5/1/2017    Procedure: FLAP CLOSURE LEFT ISCHIAL WOUND and "RIGHT" ISCHIAL FLAP ADVANCEMENT * DEBRIDEMENT, [place] ;  Surgeon: Michelle Sparrow MD;  [facility];  Service: Plastics    [place] MUSCLE-SKIN FLAP,TRUNK Left 2017    Procedure: gluteal myocutaneous rotational flap, posterior thigh v to y advancement- wound 5 x 2 5 x 8;  Surgeon: Michelle Sparrow MD;  [facility];  Service: Plastics    SPINE SURGERY      Lower back    UROFLOWMETRY SIMPLE / COMPLEX       Social History     Socioeconomic History    Marital status: /Civil Union     Spouse name: None    Number of children: None    Years of education: None    Highest education level: None   Occupational History    None   Social Needs    Financial resource strain: None    Food insecurity     Worry: None     Inability: None    Transportation needs     Medical: None     Non-medical: None   Tobacco Use    Smoking status: Former Smoker     Packs/day: 0 50     Years: 10 00     Pack years: 5 00     Last attempt to quit: [address] [place] [address]     Years since quittin 6    Smokeless tobacco: Never Used    Tobacco comment: Onset date:  11/10/17   Substance and Sexual Activity    Alcohol use: Yes     Frequency: 2-4 times a month     Comment: Per [employer]:  Social drinker (Onset date:  11/10/17)    Drug use: No    Sexual activity: None   Lifestyle    Physical activity     Days per week: None     Minutes per session: None    Stress: None   Relationships    Social connections     Talks on phone: None     Gets together: None     Attends Christian service: None     Active member of club or organization: None     Attends meetings of clubs or organizations: None     Relationship status: None    Intimate partner violence     Fear of current or ex partner: None     Emotionally abused: None     Physically abused: None     Forced sexual activity: None   Other Topics Concern    None   Social History Narrative    [facility] language [address] [place] [address] Dr Ontiveros    Social history reviewed, unchanged        Current Outpatient Medications:     ACCU-CHEK FASTCLIX LANCETS MISC, USE TWICE DAILY (Patient not taking: Reported on 2/26/2020), Disp: 102 each, Rfl: 0    Accu-Chek FastClix Lancets MISC, USE AS DIRECTED, Disp: 306 each, Rfl: 2    ACCU-CHEK SMARTVIEW test strip, TEST TWICE DAILY (Patient not taking: Reported on 2/26/2020), Disp: 100 each, Rfl: 0    acetaminophen (TYLENOL) 325 mg tablet, Take 2 tablets (650 mg total) by mouth every 6 (six) hours as needed for mild pain, headaches or fever (Patient not taking: Reported on 7/22/2020), Disp: 30 tablet, Rfl: 0    ASPIRIN LOW DOSE 81 MG EC tablet, TAKE 1 TABLET BY MOUTH EVERY DAY (Patient not taking: Reported on 7/22/2020), Disp: 30 tablet, Rfl: 0    Blood Glucose Monitoring Suppl (ACCU-CHEK OSEI SMARTVIEW) w/Device KIT, by Does not apply route daily, Disp: 1 kit, Rfl: 0    Blood Glucose Monitoring Suppl (ONE TOUCH ULTRA 2) w/Device KIT, by Does not apply route daily, Disp: 100 each, Rfl: 3    Disposable Gloves (LATEX GLOVES LARGE) MISC, Use as needed up to 3 times a day dispo 2 boxes, Disp: 2 each, Rfl: 11    glucose blood (OneTouch Ultra) test strip, 1 each by Other route daily Test daily, Disp: 100 each, Rfl: 3    ibuprofen (MOTRIN) 800 mg tablet, TAKE 1 TABLET(800 MG) BY MOUTH DAILY AS NEEDED FOR MODERATE PAIN, Disp: 60 tablet, Rfl: 0    Incontinence Supply Disposable (SUNBEAM INCONTINENT UNDER PAD) MISC, Use 1 per week, dispense 4 reusable underpads, Disp: 4 each, Rfl: 11    Incontinence Supply Disposable MISC, by Does not apply route 2 (two) times a day, Disp: 60 each, Rfl: 11    Nutritional Supplements (GLUCERNA SHAKE) LIQD, Take 3 Cans by mouth 3 (three) times a day, Disp: 90 Can, Rfl: 11    omeprazole (PriLOSEC) 20 mg delayed release capsule, Take 1 capsule (20 mg total) by mouth daily (Patient not taking: Reported on 2/26/2020), Disp: 90 capsule, Rfl: 0    omeprazole (PriLOSEC) 40 MG capsule, TAKE 1 CAPSULE(40 MG) BY MOUTH DAILY, Disp: 90 capsule, Rfl: 0   OneTouch Delica Lancets 36M MISC, by Does not apply route daily, Disp: 1 each, Rfl: 0    oxyCODONE (ROXICODONE) 20 MG TABS, Take 1 tablet (20 mg total) by mouth 3 (three) times a dayMax Daily Amount: 60 mg, Disp: 90 tablet, Rfl: 0    SODIUM HYPOCHLORITE 0 25 percent, USE UTD, Disp: , Rfl:     Review of Systems   Constitutional: Negative for activity change, appetite change, chills, fatigue and fever  HENT: Negative for congestion, hearing loss and postnasal drip  Eyes: Negative for visual disturbance  Respiratory: Negative for cough and shortness of breath  Cardiovascular: Negative for chest pain and leg swelling  Gastrointestinal: Negative for abdominal pain, constipation, diarrhea and nausea  Endocrine: Negative  Genitourinary: Negative for dysuria and urgency  Musculoskeletal: Positive for gait problem (Nonambulatory)  Skin: Positive for wound  Negative for rash  Neurological: Positive for weakness (Paraplegic)  Paraplegia and neuropathy   Hematological: Does not bruise/bleed easily  Psychiatric/Behavioral: Negative  Objective:  /70 (BP Location: Left arm, Patient Position: Prone)   Pulse 75   Temp (!) 97 1 °F (36 2 °C) (Tympanic)   Resp 18     Physical Exam  Vitals signs and nursing note reviewed  Constitutional:       Appearance: Normal appearance  He is underweight  HENT:      Head: Normocephalic and atraumatic  Skin:     Findings: Wound present  Neurological:      Mental Status: He is alert and oriented to person, place, and time  Gait: Gait abnormal (Nonambulatory)  Comments: Paraplegic with above knee amputation on the right   Psychiatric:         Mood and Affect: Mood and affect normal          Cognition and Memory: Cognition normal                  Wound 08/26/20 Buttocks Left (Active)   Wound Description Granulation tissue;Slough; Other (Comment) (75 granulation/20 slough/5 bone) 08/26/20 1103   Pressure Injury Stage Stage 4 08/26/20 1103   Shelby-wound Assessment Intact 08/26/20 1103   Wound Length (cm) 6 cm 08/26/20 1103   Wound Width (cm) 2 cm 08/26/20 1103   Wound Depth (cm) 2 4 cm 08/26/20 1103   Wound Surface Area (cm^2) 12 cm^2 08/26/20 1103   Wound Volume (cm^3) 28 8 cm^3 08/26/20 1103   Calculated Wound Volume (cm^3) 28 8 cm^3 08/26/20 1103   Tunneling 4 5 cm 08/26/20 1103   Tunneling in depth located at 6 o clock 08/26/20 1103   Undermining 6 4 08/26/20 1103   Undermining is depth extending from 7-12, deepest at 12 08/26/20 1103   Drainage Amount Large 08/26/20 1103   Drainage Description Serosanguineous 08/26/20 1103   Non-staged Wound Description Full thickness 08/26/20 1103   Treatments Cleansed 08/26/20 1103   Dressing Changed Changed 08/26/20 1103   Patient Tolerance Tolerated well 08/26/20 1103   Dressing Status Clean;Dry; Intact 08/26/20 1103       Wound 08/26/20 Hip Left (Active)   Wound Description Granulation tissue;Slough (50/50) 08/26/20 1105   Pressure Injury Stage Stage 3 08/26/20 1105   Shelby-wound Assessment Intact 08/26/20 1105   Wound Length (cm) 1 5 cm 08/26/20 1105   Wound Width (cm) 1 5 cm 08/26/20 1105   Wound Depth (cm) 0 1 cm 08/26/20 1105   Wound Surface Area (cm^2) 2 25 cm^2 08/26/20 1105   Wound Volume (cm^3) 0 23 cm^3 08/26/20 1105   Calculated Wound Volume (cm^3) 0 23 cm^3 08/26/20 1105   Drainage Amount Moderate 08/26/20 1105   Drainage Description Serosanguineous 08/26/20 1105   Non-staged Wound Description Full thickness 08/26/20 1105   Treatments Cleansed 08/26/20 1105   Dressing Changed Changed 08/26/20 1105   Patient Tolerance Tolerated well 08/26/20 1105   Dressing Status Clean;Dry; Intact 08/26/20 1105       Procedures           Wound Instructions:  Orders Placed This Encounter   Procedures    Wound cleansing and dressings     Left buttock    WOUND CLEANSING & DRESSINGS  Cleanse/Irrigate wound with Normal Saline  - Please ensure the tunnel at 12 oclock and at 4 oclock are irrigated well with NSS  Cleanse gaby-wound skin with pH balanced soap and water  Apply primary dressing:  Pack with saline moistened gauze  IF wound starts to look worse resume dakins packing    Apply secondary dressing: - 4x4s, ABD  Secure with: - medfix tape  Change dressing Daily & prn  Wound Left Hip    WOUND CLEANSING & DRESSINGS  Cleanse/Irrigate wound with Normal Saline  Do not get dressing wet  Apply primary dressing: - dermagran gauze  Apply secondary dressing: - 4x4 gauze and ABD  Secure with: - medfix tape  Change dressing Daily & prn     HOME CARE  Continue home care services/skilled nursing - 3 x per week  Open, draining complex wounds    Additional Orders:    OFF-LOADING  Avoid pressure at wound site  - all wounds, including healed L medial knee  Wheelchair Cushion  - ROHO cushion  Do Not Sit for Long Periods of Time  - 1 hr max for meals only, otherwise in bed and turn side to side at least every 3 hours or more frequently  Off Loading Mattress - air mattress    FOLLOW-UP APPOINTMENTS  Return Appointment in: - 4 weeks  Other: - please notify wound center of surgery date when you get it    MISC/ADDITIONAL ORDERS  Continue all home medications unless otherwise instructed  Increase dietary Protein intake - Increase your protein with food: chicken, beef, beans, cottage cheese, nuts    May try Ryan to help increase protein, sample protein supplements were given to you today, you can look up this product on [employer]         This treatment was performed in the office today     Standing Status:   Future     Standing Expiration Date:   8/26/2021         Yanet Jade MD

## 2020-09-04 DIAGNOSIS — G89.29 CHRONIC LOW BACK PAIN WITHOUT SCIATICA, UNSPECIFIED BACK PAIN LATERALITY: ICD-10-CM

## 2020-09-04 DIAGNOSIS — M54.50 CHRONIC LOW BACK PAIN WITHOUT SCIATICA, UNSPECIFIED BACK PAIN LATERALITY: ICD-10-CM

## 2020-09-04 DIAGNOSIS — F11.90 CHRONIC, CONTINUOUS USE OF OPIOIDS: Chronic | ICD-10-CM

## 2020-09-04 RX ORDER — IBUPROFEN 800 MG/1
TABLET ORAL
Qty: 60 TABLET | Refills: 0 | Status: SHIPPED | OUTPATIENT
Start: 2020-09-04 | End: 2020-11-02

## 2020-09-04 RX ORDER — OXYCODONE HYDROCHLORIDE 20 MG/1
20 TABLET ORAL 3 TIMES DAILY
Qty: 90 TABLET | Refills: 0 | Status: SHIPPED | OUTPATIENT
Start: 2020-09-04 | End: 2020-10-01 | Stop reason: SDUPTHER

## 2020-09-04 NOTE — TELEPHONE ENCOUNTER
Patient requesting Oxycodone, stated he left messages on refill line and hasn't heard back and does not want to go through the extended weekend without it

## 2020-09-30 ENCOUNTER — TELEPHONE (OUTPATIENT)
Dept: FAMILY MEDICINE CLINIC | Facility: CLINIC | Age: 60
End: 2020-09-30

## 2020-10-01 ENCOUNTER — OFFICE VISIT (OUTPATIENT)
Dept: WOUND CARE | Facility: HOSPITAL | Age: 60
End: 2020-10-01
Payer: MEDICARE

## 2020-10-01 VITALS
TEMPERATURE: 97.5 F | SYSTOLIC BLOOD PRESSURE: 128 MMHG | RESPIRATION RATE: 20 BRPM | DIASTOLIC BLOOD PRESSURE: 82 MMHG | HEART RATE: 72 BPM

## 2020-10-01 DIAGNOSIS — L89.44 PRESSURE INJURY OF CONTIGUOUS REGION INVOLVING BACK AND LEFT BUTTOCK, STAGE 4 (HCC): ICD-10-CM

## 2020-10-01 DIAGNOSIS — L89.223 STAGE III PRESSURE ULCER OF LEFT HIP (HCC): Primary | ICD-10-CM

## 2020-10-01 DIAGNOSIS — F11.90 CHRONIC, CONTINUOUS USE OF OPIOIDS: Chronic | ICD-10-CM

## 2020-10-01 PROCEDURE — 11042 DBRDMT SUBQ TIS 1ST 20SQCM/<: CPT | Performed by: FAMILY MEDICINE

## 2020-10-01 PROCEDURE — 97597 DBRDMT OPN WND 1ST 20 CM/<: CPT | Performed by: FAMILY MEDICINE

## 2020-10-01 PROCEDURE — NC001 PR NO CHARGE: Performed by: FAMILY MEDICINE

## 2020-10-01 RX ORDER — OXYCODONE HYDROCHLORIDE 20 MG/1
20 TABLET ORAL 3 TIMES DAILY
Qty: 90 TABLET | Refills: 0 | Status: SHIPPED | OUTPATIENT
Start: 2020-10-01 | End: 2020-10-31

## 2020-10-06 ENCOUNTER — TELEPHONE (OUTPATIENT)
Dept: FAMILY MEDICINE CLINIC | Facility: CLINIC | Age: 60
End: 2020-10-06

## 2020-10-08 ENCOUNTER — TELEPHONE (OUTPATIENT)
Dept: FAMILY MEDICINE CLINIC | Facility: CLINIC | Age: 60
End: 2020-10-08

## 2020-10-13 ENCOUNTER — TELEPHONE (OUTPATIENT)
Dept: FAMILY MEDICINE CLINIC | Facility: CLINIC | Age: 60
End: 2020-10-13

## 2020-10-15 DIAGNOSIS — E46 PROTEIN MALNUTRITION (HCC): ICD-10-CM

## 2020-11-01 DIAGNOSIS — M54.50 CHRONIC LOW BACK PAIN WITHOUT SCIATICA, UNSPECIFIED BACK PAIN LATERALITY: ICD-10-CM

## 2020-11-01 DIAGNOSIS — G89.29 CHRONIC LOW BACK PAIN WITHOUT SCIATICA, UNSPECIFIED BACK PAIN LATERALITY: ICD-10-CM

## 2020-11-02 ENCOUNTER — TELEPHONE (OUTPATIENT)
Dept: FAMILY MEDICINE CLINIC | Facility: CLINIC | Age: 60
End: 2020-11-02

## 2020-11-02 DIAGNOSIS — L89.324 STAGE IV PRESSURE ULCER OF LEFT BUTTOCK (HCC): ICD-10-CM

## 2020-11-02 DIAGNOSIS — M54.50 CHRONIC LOW BACK PAIN WITHOUT SCIATICA, UNSPECIFIED BACK PAIN LATERALITY: Primary | ICD-10-CM

## 2020-11-02 DIAGNOSIS — G89.29 CHRONIC LOW BACK PAIN WITHOUT SCIATICA, UNSPECIFIED BACK PAIN LATERALITY: Primary | ICD-10-CM

## 2020-11-02 RX ORDER — OXYCODONE HYDROCHLORIDE 20 MG/1
20 TABLET ORAL 3 TIMES DAILY PRN
Qty: 90 TABLET | Refills: 0 | Status: SHIPPED | OUTPATIENT
Start: 2020-11-02 | End: 2020-12-02 | Stop reason: SDUPTHER

## 2020-11-02 RX ORDER — IBUPROFEN 800 MG/1
TABLET ORAL
Qty: 60 TABLET | Refills: 0 | Status: SHIPPED | OUTPATIENT
Start: 2020-11-02 | End: 2021-01-29

## 2020-11-04 ENCOUNTER — TELEPHONE (OUTPATIENT)
Dept: FAMILY MEDICINE CLINIC | Facility: CLINIC | Age: 60
End: 2020-11-04

## 2020-11-05 ENCOUNTER — OFFICE VISIT (OUTPATIENT)
Dept: WOUND CARE | Facility: HOSPITAL | Age: 60
End: 2020-11-05
Payer: MEDICARE

## 2020-11-05 VITALS
RESPIRATION RATE: 18 BRPM | HEART RATE: 80 BPM | SYSTOLIC BLOOD PRESSURE: 112 MMHG | TEMPERATURE: 96.2 F | DIASTOLIC BLOOD PRESSURE: 64 MMHG

## 2020-11-05 DIAGNOSIS — L89.154 STAGE IV PRESSURE ULCER OF SACRAL REGION (HCC): Primary | ICD-10-CM

## 2020-11-05 DIAGNOSIS — L89.223 STAGE III PRESSURE ULCER OF LEFT HIP (HCC): ICD-10-CM

## 2020-11-05 DIAGNOSIS — L89.44 PRESSURE INJURY OF CONTIGUOUS REGION INVOLVING BACK AND LEFT BUTTOCK, STAGE 4 (HCC): ICD-10-CM

## 2020-11-05 PROCEDURE — 99213 OFFICE O/P EST LOW 20 MIN: CPT | Performed by: FAMILY MEDICINE

## 2020-11-05 PROCEDURE — 97597 DBRDMT OPN WND 1ST 20 CM/<: CPT | Performed by: FAMILY MEDICINE

## 2020-11-05 RX ORDER — LIDOCAINE HYDROCHLORIDE 40 MG/ML
5 SOLUTION TOPICAL ONCE
Status: COMPLETED | OUTPATIENT
Start: 2020-11-05 | End: 2020-11-05

## 2020-11-05 RX ADMIN — LIDOCAINE HYDROCHLORIDE 5 ML: 40 SOLUTION TOPICAL at 11:12

## 2020-11-20 ENCOUNTER — TELEPHONE (OUTPATIENT)
Dept: FAMILY MEDICINE CLINIC | Facility: CLINIC | Age: 60
End: 2020-11-20

## 2020-12-02 DIAGNOSIS — L89.324 STAGE IV PRESSURE ULCER OF LEFT BUTTOCK (HCC): ICD-10-CM

## 2020-12-02 DIAGNOSIS — G89.29 CHRONIC LOW BACK PAIN WITHOUT SCIATICA, UNSPECIFIED BACK PAIN LATERALITY: ICD-10-CM

## 2020-12-02 DIAGNOSIS — M54.50 CHRONIC LOW BACK PAIN WITHOUT SCIATICA, UNSPECIFIED BACK PAIN LATERALITY: ICD-10-CM

## 2020-12-02 RX ORDER — OXYCODONE HYDROCHLORIDE 20 MG/1
20 TABLET ORAL 3 TIMES DAILY PRN
Qty: 90 TABLET | Refills: 0 | Status: SHIPPED | OUTPATIENT
Start: 2020-12-02 | End: 2021-01-04 | Stop reason: SDUPTHER

## 2021-01-01 NOTE — TELEPHONE ENCOUNTER
Pt has appt with you on 10/16/18 but says he doesn't feel well and would like to have blood work done  There are scripts for A1C (5/4/18) & CBC, TSH,  in chart dated: 4/23/18     Does he need new scripts? Statement Selected

## 2021-01-04 DIAGNOSIS — M54.50 CHRONIC LOW BACK PAIN WITHOUT SCIATICA, UNSPECIFIED BACK PAIN LATERALITY: ICD-10-CM

## 2021-01-04 DIAGNOSIS — F11.90 CHRONIC NARCOTIC USE: Primary | ICD-10-CM

## 2021-01-04 DIAGNOSIS — L89.324 STAGE IV PRESSURE ULCER OF LEFT BUTTOCK (HCC): ICD-10-CM

## 2021-01-04 DIAGNOSIS — G89.29 CHRONIC LOW BACK PAIN WITHOUT SCIATICA, UNSPECIFIED BACK PAIN LATERALITY: ICD-10-CM

## 2021-01-05 RX ORDER — OXYCODONE HYDROCHLORIDE 20 MG/1
20 TABLET ORAL 3 TIMES DAILY PRN
Qty: 90 TABLET | Refills: 0 | Status: SHIPPED | OUTPATIENT
Start: 2021-01-05 | End: 2021-02-01 | Stop reason: SDUPTHER

## 2021-01-05 RX ORDER — NALOXONE HYDROCHLORIDE 4 MG/.1ML
SPRAY NASAL
Qty: 1 EACH | Refills: 1 | Status: SHIPPED | OUTPATIENT
Start: 2021-01-05

## 2021-01-06 ENCOUNTER — TELEPHONE (OUTPATIENT)
Dept: FAMILY MEDICINE CLINIC | Facility: CLINIC | Age: 61
End: 2021-01-06

## 2021-01-07 NOTE — TELEPHONE ENCOUNTER
Done
Faxed 01/07/2021
SIGNATURES NEEDED FOR national seating & mobility  FORM RECEIVED VIA FAX  WILL BE PLACED IN YOUR BIN AT ASSIGNED DELIVERY TIMES 
normal (ped)...

## 2021-01-21 ENCOUNTER — OFFICE VISIT (OUTPATIENT)
Dept: WOUND CARE | Facility: HOSPITAL | Age: 61
End: 2021-01-21
Payer: MEDICARE

## 2021-01-21 VITALS
DIASTOLIC BLOOD PRESSURE: 66 MMHG | SYSTOLIC BLOOD PRESSURE: 118 MMHG | HEART RATE: 80 BPM | TEMPERATURE: 97 F | RESPIRATION RATE: 20 BRPM

## 2021-01-21 DIAGNOSIS — L89.223 STAGE III PRESSURE ULCER OF LEFT HIP (HCC): ICD-10-CM

## 2021-01-21 DIAGNOSIS — L89.44 PRESSURE INJURY OF CONTIGUOUS REGION INVOLVING BACK AND LEFT BUTTOCK, STAGE 4 (HCC): Primary | ICD-10-CM

## 2021-01-21 PROCEDURE — 99213 OFFICE O/P EST LOW 20 MIN: CPT | Performed by: FAMILY MEDICINE

## 2021-01-21 PROCEDURE — 97597 DBRDMT OPN WND 1ST 20 CM/<: CPT | Performed by: FAMILY MEDICINE

## 2021-01-21 RX ORDER — LIDOCAINE HYDROCHLORIDE 40 MG/ML
5 SOLUTION TOPICAL ONCE
Status: COMPLETED | OUTPATIENT
Start: 2021-01-21 | End: 2021-01-21

## 2021-01-21 RX ADMIN — LIDOCAINE HYDROCHLORIDE 5 ML: 40 SOLUTION TOPICAL at 12:39

## 2021-01-21 NOTE — PATIENT INSTRUCTIONS
Wound cleansing and dressings           Left buttock  Cleanse/Irrigate wound with Normal Saline  - Please ensure the tunnel at 7-12 oclock and at 4-5 oclock are irrigated well with NSS  Cleanse gaby-wound skin with pH balanced soap and water  Apply primary dressing:  Pack with saline moistened gauze  IF wound starts to look worse resume dakins packing     Apply secondary dressing: - 4x4s, optilock, then ABD pad  Secure with: - medfix tape  Change dressing every other day and prn if soiled       Wound Left Hip   Cleanse/Irrigate wound with Normal Saline  Do not get dressing wet  Apply primary dressing: - dermagran gauze  Apply secondary dressing: - 4x4 gauze, optilock, then ABD pad  Secure with: - medfix tape  Change dressing every other day and prn if soiled       HOME CARE  Continue home care services/skilled nursing - 3 x per week  Open, draining complex wounds     Additional Orders:     OFF-LOADING  Avoid pressure at wound site  - all wounds, including healed L medial knee  Wheelchair Cushion  - ROHO cushion  Do Not Sit for Long Periods of Time  - 1 hr max for meals only, otherwise in bed and turn side to side at least every 3 hours or more frequently  Resume Off Loading Mattress - air mattress  Reposition in regular bed for now at least every 1-2 hours while awake       FOLLOW-UP APPOINTMENTS  Return Appointment in: - 4 weeks     MISC/ADDITIONAL ORDERS  Continue all home medications unless otherwise instructed  Increase dietary Protein intake - Increase your protein with food: chicken, beef, beans, cottage cheese, nuts    Continue Ryan to help increase protein

## 2021-01-21 NOTE — PROGRESS NOTES
Patient ID: Rubina Garcia is a 62 y o  male Date of Birth 1960       Chief Complaint   Patient presents with    Follow Up Wound Care Visit     Left hip and left buttock wound       Allergies:  Patient has no known allergies  Diagnosis:   Diagnosis ICD-10-CM Associated Orders   1  Pressure injury of contiguous region involving back and left buttock, stage 4 ([facility])  L89 44 lidocaine (XYLOCAINE) 4 % topical solution 5 mL     Wound cleansing and dressings   2  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 ([facility])  L89 223 lidocaine (XYLOCAINE) 4 % topical solution 5 mL     Wound cleansing and dressings     Debridement        Assessment & Plan:  No significant changes in stage IV pressure ulcer of the left buttock  Stage III pressure ulcer of the right hip is slightly improved  Selectively debrided today  Patient needs to see plastic surgery to try to get this closed if at all possible  Subjective:   8/26/20: Patient returns to the wound center for further evaluation of his stage IV pressure ulcer of the sacrum and stage III pressure ulcer of his hip  He is palliative care  Patient is paraplegic  Current treatment includes saline dressings  Dermagran to the hip ulcer  He has all the offloading devices  He denies any fever, chills or cough  Nothing else is change for him  His pain is unchanged  Is neuropathic     10/1/20: Followup stage IV pressure ulcer of the sacrum and stage III of the left hip  Nothing much is changed  He has no fever, pain, chills  Continues with saline packing to the stage IV and Dermagran to the stage III  No cough  11/5/20: Followup stage IV pressure ulcer of the sacrum and stage III pressure ulcer of the left hip  Stage IV pressure ulcer of the left buttock also  Essentially, nothing is changed  He still has not made an appointment with plastic surgery and wants to wait tail after the 1st of the year    Current wound care includes saline gauze packing and Dermagran to the hip   He denies any fever, chills or cough  1/21/21: Followup stage IV pressure ulcer of the sacrum/left buttock  Also stage III of the left hip  Patient states that he gave up his air mattress and now is in a regular bed  He feels that he can offload just as well  Did not want to use the air mattress any longer  He states that he needs a referral to plastic surgery  Saline packing is being used to the large ulcer and Dermagran to the left hip  He denies any fever, chills or cough            The following portions of the patient's history were reviewed and updated as appropriate:   Patient Active Problem List   Diagnosis    Stage IV pressure ulcer of sacral region ([address] )    Stage IV pressure ulcer of left heel ([address] )    Cyst of joint of shoulder    Anemia    Paraplegic spinal paralysis ([address] )    Sinus tachycardia    GERD (gastroesophageal reflux disease)    History of osteomyelitis    Anxiety    Amputated right leg (HCC)    Benign essential hypertension    Diabetes mellitus type II, controlled ([address] )    Diarrhea    Decubitus ulcer of ischium, left, stage IV (HCC)    Chronic, continuous use of opioids    Colostomy in place [facility])    Colon cancer screening    Dyspepsia    Epigastric pain    Osteomyelitis of pelvic region ([address] )    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[address] )    Neurogenic bladder    Sepsis with hypotension ([address] )    History of Clostridium difficile colitis    Hyperkalemia    Stage II pressure ulcer of buttock ([address] )    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, stage 3 ([address] )    SBO (small bowel obstruction) ([address] )    Sepsis ([address] )    Hypokalemia    Renal cyst    Other male erectile dysfunction    Prostate cancer screening     Past Medical History:   Diagnosis Date    Anemia     Blind     r eye    Chronic cystitis     Colostomy in place [facility])     Detrusor sphincter dyssynergia     Diabetes mellitus ([address] )     Poorly controlled type 2; Last Assessed:  3/18/14    Erectile dysfunction     Frequency of urination     GERD (gastroesophageal reflux disease)     History of diabetes mellitus     History of osteomyelitis     Hx of leg amputation (HCC)     r high upper leg    Hyperlipidemia     Hypertension     Hypospadias     Incomplete bladder emptying     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([facility] ) 9/16/2019    Neurogenic bladder     Paralysis ([facility] )     Paraplegia (HCC)     Spinal cord cysts     Ulcer of sacral region [facility])     Urge incontinence      Past Surgical History:   Procedure Laterality Date    AMPUTATION      At hip; Last Assessed:  1/19/16    BLADDER SURGERY      COLON SURGERY      llq ostomy pouch    COLOSTOMY      COMPLEX CYSTOMETROGRAM  2014    CT CYSTOGRAM  9/19/2019    CYSTOSCOPY  2014    IR PICC LINE REPOSITION  9/23/2019    IR SUPRAPUBIC CATHETER PLACEMENT  9/19/2019    LEG AMPUTATION      MEATOTOMY      PENILE PROSTHESIS  REMOVAL N/A 11/1/2019    Procedure: REMOVAL PROSTHESIS PENILE;  Surgeon: Tarik Buitrago MD;  Location: [facility];  Service: Urology    PENILE PROSTHESIS IMPLANT  2011    WI ADJ TISS XFER SCALP,EXTREM 10 1-30 SQCM Left 5/1/2017    Procedure: POSTERIOR THIGH V-Y ADMANCEMENT;  Surgeon: Lincoln Ochoa MD;  [facility];  Service: Plastics    WI MUSCLE-SKIN FLAP,TRUNK Left 5/1/2017    Procedure: FLAP CLOSURE LEFT ISCHIAL WOUND and "RIGHT" ISCHIAL FLAP ADVANCEMENT * Will Plater ;  Surgeon: Lincoln Ochoa MD;  [facility];  Service: Plastics    WI MUSCLE-SKIN FLAP,TRUNK Left 9/27/2017    Procedure: gluteal myocutaneous rotational flap, posterior thigh v to y advancement- wound 5 x 2 5 x 8;  Surgeon: Lincoln Ochoa MD;  [facility];  Service: Plastics    SPINE SURGERY      Lower back    UROFLOWMETRY SIMPLE / COMPLEX  2014     Family History   Problem Relation Age of Onset    No Known Problems Mother     No Known Problems Father      Social History     Socioeconomic History    Marital status: /Civil [employer]     Spouse name: None    Number of children: None    Years of education: None    Highest education level: None   Occupational History    None   Social Needs    Financial resource strain: None    Food insecurity     Worry: None     Inability: None    Transportation needs     Medical: None     Non-medical: None   Tobacco Use    Smoking status: Former Smoker     Packs/day: 0 50     Years: 10 00     Pack years: 5 00     Quit date:      Years since quittin 0    Smokeless tobacco: Never Used    Tobacco comment: Onset date:  11/10/17   Substance and Sexual Activity    Alcohol use: Yes     Frequency: 2-4 times a month     Comment: Per [employer]:  Social drinker (Onset date:  11/10/17)    Drug use: No    Sexual activity: None   Lifestyle    Physical activity     Days per week: None     Minutes per session: None    Stress: None   Relationships    Social connections     Talks on phone: None     Gets together: None     Attends Congregational service: None     Active member of club or organization: None     Attends meetings of clubs or organizations: None     Relationship status: None    Intimate partner violence     Fear of current or ex partner: None     Emotionally abused: None     Physically abused: None     Forced sexual activity: None   Other Topics Concern    None   Social History Narrative    Native language Urdu    [employer]    Social history reviewed, unchanged       Current Outpatient Medications:     ACCU-CHEK FASTCLIX LANCETS MISC, USE TWICE DAILY (Patient not taking: Reported on 2020), Disp: 102 each, Rfl: 0    Accu-Chek FastClix Lancets MISC, USE AS DIRECTED, Disp: 306 each, Rfl: 2    ACCU-CHEK SMARTVIEW test strip, TEST TWICE DAILY (Patient not taking: Reported on 2020), Disp: 100 each, Rfl: 0    acetaminophen (TYLENOL) 325 mg tablet, Take 2 tablets (650 mg total) by mouth every 6 (six) hours as needed for mild pain, headaches or fever (Patient not taking: Reported on 7/22/2020), Disp: 30 tablet, Rfl: 0    ASPIRIN LOW DOSE 81 MG EC tablet, TAKE 1 TABLET BY MOUTH EVERY DAY (Patient not taking: Reported on 7/22/2020), Disp: 30 tablet, Rfl: 0    Blood Glucose Monitoring Suppl (ACCU-CHEK OSEI SMARTVIEW) w/Device KIT, by Does not apply route daily, Disp: 1 kit, Rfl: 0    Blood Glucose Monitoring Suppl (ONE TOUCH ULTRA 2) w/Device KIT, by Does not apply route daily, Disp: 100 each, Rfl: 3    Disposable Gloves (LATEX GLOVES LARGE) MISC, Use as needed up to 3 times a day dispo 2 boxes, Disp: 2 each, Rfl: 11    glucose blood (OneTouch Ultra) test strip, 1 each by Other route daily Test daily, Disp: 100 each, Rfl: 3    ibuprofen (MOTRIN) 800 mg tablet, TAKE 1 TABLET(800 MG) BY MOUTH DAILY AS NEEDED FOR MODERATE PAIN, Disp: 60 tablet, Rfl: 0    Incontinence Supply Disposable (SUNBEAM INCONTINENT UNDER PAD) MISC, Use 1 per week, dispense 4 reusable underpads, Disp: 4 each, Rfl: 11    Incontinence Supply Disposable MISC, by Does not apply route 2 (two) times a day, Disp: 60 each, Rfl: 11    naloxone (NARCAN) 4 mg/0 1 mL nasal spray, Administer 1 spray into a nostril   If no response after 2-3 minutes, give another dose in the other nostril using a new spray , Disp: 1 each, Rfl: 1    Nutritional Supplements (GLUCERNA SHAKE) LIQD, Take 3 Cans by mouth 3 (three) times a day, Disp: 90 Can, Rfl: 11    omeprazole (PriLOSEC) 20 mg delayed release capsule, Take 1 capsule (20 mg total) by mouth daily (Patient not taking: Reported on 2/26/2020), Disp: 90 capsule, Rfl: 0    omeprazole (PriLOSEC) 40 MG capsule, TAKE 1 CAPSULE(40 MG) BY MOUTH DAILY, Disp: 90 capsule, Rfl: 0    OneTouch Delica Lancets 67F MISC, by Does not apply route daily, Disp: 1 each, Rfl: 0    oxyCODONE (ROXICODONE) 20 MG TABS, Take 1 tablet (20 mg total) by mouth 3 (three) times a day as needed for moderate painMax Daily Amount: 60 mg, Disp: 90 tablet, Rfl: 0    SODIUM HYPOCHLORITE 0 25 percent, USE UTD, Disp: , Rfl:     Current Facility-Administered Medications:     lidocaine (XYLOCAINE) 4 % topical solution 5 mL, 5 mL, Topical, Once, Ana Restrepo MD    Review of Systems   Constitutional: Negative for activity change, appetite change, chills, fatigue and fever  HENT: Negative for congestion, hearing loss and postnasal drip  Eyes: Negative for visual disturbance  Respiratory: Negative for cough and shortness of breath  Cardiovascular: Negative for chest pain and leg swelling  Gastrointestinal: Negative for abdominal pain, constipation, diarrhea and nausea  Endocrine: Negative  Genitourinary: Negative for dysuria and urgency  Musculoskeletal: Positive for gait problem (Nonambulatory)  Skin: Positive for wound (Left hip and sacrum)  Negative for rash  Neurological: Positive for weakness (Paraplegic)  Paraplegia and neuropathy   Hematological: Does not bruise/bleed easily  Psychiatric/Behavioral: Negative  Objective:  /66   Pulse 80   Temp (!) 97 °F (36 1 °C)   Resp 20   Pain Score:   8     Physical Exam  Vitals signs and nursing note reviewed  Constitutional:       Appearance: Normal appearance  He is well-developed and normal weight  HENT:      Head: Normocephalic and atraumatic  Skin:     General: Skin is warm and dry  Findings: Wound present  Comments: Sacral/left buttock ulcer has clean, granular tissue but with significant undermining and some tunneling  No significant change  The left hip ulcer is slightly improved with fibrin and slough in the base  Neurological:      Mental Status: He is alert and oriented to person, place, and time  Psychiatric:         Attention and Perception: Attention normal          Mood and Affect: Mood and affect normal          Behavior: Behavior is cooperative           Cognition and Memory: Cognition normal          Wound 08/26/20 Buttocks Left (Active)   Wound Image   01/21/21 1031   Wound Description Epithelialization;Granulation tissue 01/21/21 1050   Pressure Injury Stage 4 01/21/21 1050   Shelby-wound Assessment Scar Tissue; Intact 01/21/21 1050   Wound Length (cm) 4 cm 01/21/21 1050   Wound Width (cm) 1 5 cm 01/21/21 1050   Wound Depth (cm) 1 5 cm 01/21/21 1050   Wound Surface Area (cm^2) 6 cm^2 01/21/21 1050   Wound Volume (cm^3) 9 cm^3 01/21/21 1050   Calculated Wound Volume (cm^3) 9 cm^3 01/21/21 1050   Change in Wound Size % 68 75 01/21/21 1050   Tunneling 4 5 cm 08/26/20 1103   Tunneling in depth located at 3 5cm 5 OCLOCK 01/21/21 1050   Undermining 5 5 01/21/21 1050   Undermining is depth extending from 4-5 and 7-12  5 5cm at 12 01/21/21 1050   Drainage Amount Moderate 01/21/21 1050   Drainage Description Serosanguineous 01/21/21 1050   Non-staged Wound Description Full thickness 01/21/21 1050   Treatments Cleansed 01/21/21 1050   Wound packed? Yes 01/21/21 1050   Packing- # removed 1 11/05/20 1036   Dressing Changed Changed 01/21/21 1050   Patient Tolerance Tolerated well 01/21/21 1050   Dressing Status Removed 01/21/21 1050       Wound 08/26/20 Hip Left (Active)   Wound Image   01/21/21 1119   Wound Description Granulation tissue;Slough 01/21/21 1052   Pressure Injury Stage 3 01/21/21 1052   Shelby-wound Assessment Scar Tissue; Intact 01/21/21 1052   Wound Length (cm) 1 3 cm 01/21/21 1052   Wound Width (cm) 2 3 cm 01/21/21 1052   Wound Depth (cm) 0 3 cm 01/21/21 1052   Wound Surface Area (cm^2) 2 99 cm^2 01/21/21 1052   Wound Volume (cm^3) 0 9 cm^3 01/21/21 1052   Calculated Wound Volume (cm^3) 0 9 cm^3 01/21/21 1052   Change in Wound Size % -291 3 01/21/21 1052   Drainage Amount Moderate 01/21/21 1052   Drainage Description Sanguineous 01/21/21 1052   Non-staged Wound Description Full thickness 01/21/21 1052   Treatments Cleansed 01/21/21 1052   Dressing Changed Changed 11/05/20 1038   Patient Tolerance Tolerated well 01/21/21 1052   Dressing Status Removed 01/21/21 1052 Debridement   Wound 08/26/20 Hip Left    Universal Protocol:  Consent: Verbal consent obtained  Written consent obtained  Consent given by: patient  Time out: Immediately prior to procedure a "time out" was called to verify the correct patient, procedure, equipment, support staff and site/side marked as required  Patient identity confirmed: verbally with patient      Performed by: physician  Debridement type: selective  Pain control: lidocaine 4%  Post-debridement measurements  Length (cm): 1 3  Width (cm): 2 3  Depth (cm): 0 3  Percent debrided: 100%  Surface Area (cm^2): 2 99  Area debrided (cm^2): 2 99  Volume (cm^3): 0 9  Devitalized tissue debrided: fibrin and slough  Instrument(s) utilized: curette  Bleeding: small  Hemostasis obtained with: pressure  Procedural pain (0-10): 0  Post-procedural pain: 0   Response to treatment: procedure was tolerated well                   Wound Instructions:  Orders Placed This Encounter   Procedures    Wound cleansing and dressings     Wound cleansing and dressings           Left buttock  Cleanse/Irrigate wound with Normal Saline  - Please ensure the tunnel at 7-12 oclock and at 4-5 oclock are irrigated well with NSS  Cleanse gaby-wound skin with pH balanced soap and water  Apply primary dressing:  Pack with saline moistened gauze  IF wound starts to look worse resume dakins packing     Apply secondary dressing: - 4x4s, optilock, then ABD pad  Secure with: - medfix tape  Change dressing every other day and prn if soiled       Wound Left Hip   Cleanse/Irrigate wound with Normal Saline  Do not get dressing wet  Apply primary dressing: - dermagran gauze  Apply secondary dressing: - 4x4 gauze, optilock, then ABD pad  Secure with: - medfix tape  Change dressing every other day and prn if soiled       HOME CARE  Continue home care services/skilled nursing - 3 x per week  Open, draining complex wounds     Additional Orders:     OFF-LOADING  Avoid pressure at wound site   - all wounds, including healed L medial knee  Wheelchair Cushion  - ROHO cushion  Do Not Sit for Long Periods of Time  - 1 hr max for meals only, otherwise in bed and turn side to side at least every 3 hours or more frequently  Resume Off Loading Mattress - air mattress  Reposition in regular bed for now at least every 1-2 hours while awake       FOLLOW-UP APPOINTMENTS  Return Appointment in: - 4 weeks     MISC/ADDITIONAL ORDERS  Continue all home medications unless otherwise instructed  Increase dietary Protein intake - Increase your protein with food: chicken, beef, beans, cottage cheese, nuts  Continue Ryan to help increase protein     Standing Status:   Future     Standing Expiration Date:   1/21/2022    Debridement     This order was created via procedure documentation         Gustabo Michaels MD, CHT, CWS       Portions of the record may have been created with voice recognition software  Occasional wrong word or "sound alike" substitutions may have occurred due to the inherent limitations of voice recognition software  Read the chart carefully and recognize, using context, where substitutions have occurred

## 2021-01-21 NOTE — LETTER
January 21, 2021     MD Varun [address]    Patient: Pratibha Lane   YOB: 1960   Date of Visit: 1/21/2021       Dear Dr Perla Olvera:    I am referring this patient to you for consideration of closure of his stage IV of the buttock and stage III pressure injury and hip  He will be calling year office  Thank you    Sincerely,        Ana Restrepo MD, CWS        CC: No Recipients  Ana Restrepo MD  1/21/2021 12:32 PM  Incomplete  Patient ID: Pratibha Lane is a 62 y o  male Date of Birth 1960       Chief Complaint   Patient presents with    Follow Up Wound Care Visit     Left hip and left buttock wound       Allergies:  Patient has no known allergies  Diagnosis:   Diagnosis ICD-10-CM Associated Orders   1  Pressure injury of contiguous region involving back and left buttock, stage 4 ([facility])  L89 44 lidocaine (XYLOCAINE) 4 % topical solution 5 mL     Wound cleansing and dressings   2  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 ([facility])  L89 223 lidocaine (XYLOCAINE) 4 % topical solution 5 mL     Wound cleansing and dressings     Debridement        Assessment & Plan:  No significant changes in stage IV pressure ulcer of the left buttock  Stage III pressure ulcer of the right hip is slightly improved  Selectively debrided today  Patient needs to see plastic surgery to try to get this closed if at all possible  Subjective:   8/26/20: Patient returns to the wound center for further evaluation of his stage IV pressure ulcer of the sacrum and stage III pressure ulcer of his hip  He is palliative care  Patient is paraplegic  Current treatment includes saline dressings  Dermagran to the hip ulcer  He has all the offloading devices  He denies any fever, chills or cough  Nothing else is change for him  His pain is unchanged  Is neuropathic     10/1/20: Followup stage IV pressure ulcer of the sacrum and stage III of the left hip  Nothing much is changed  He has no fever, pain, chills  Continues with saline packing to the stage IV and Dermagran to the stage III  No cough  11/5/20: Followup stage IV pressure ulcer of the sacrum and stage III pressure ulcer of the left hip  Stage IV pressure ulcer of the left buttock also  Essentially, nothing is changed  He still has not made an appointment with plastic surgery and wants to wait tail after the 1st of the year  Current wound care includes saline gauze packing and Dermagran to the hip  He denies any fever, chills or cough  1/21/21: Followup stage IV pressure ulcer of the sacrum/left buttock  Also stage III of the left hip  Patient states that he gave up his air mattress and now is in a regular bed  He feels that he can offload just as well  Did not want to use the air mattress any longer  He states that he needs a referral to plastic surgery  Saline packing is being used to the large ulcer and Dermagran to the left hip  He denies any fever, chills or cough            The following portions of the patient's history were reviewed and updated as appropriate:   Patient Active Problem List   Diagnosis    Stage IV pressure ulcer of sacral region ([address] )    Stage IV pressure ulcer of left heel ([address] )    Cyst of joint of shoulder    Anemia    Paraplegic spinal paralysis ([address] )    Sinus tachycardia    GERD (gastroesophageal reflux disease)    History of osteomyelitis    Anxiety    Amputated right leg (HCC)    Benign essential hypertension    Diabetes mellitus type II, controlled ([address] )    Diarrhea    Decubitus ulcer of ischium, left, stage IV (HCC)    Chronic, continuous use of opioids    [facility] place [facility])    Colon cancer screening    Dyspepsia    Epigastric pain    Osteomyelitis of pelvic region ([address] )    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)    SBO (small bowel obstruction) ([facility])    Sepsis ([address] )    Hypokalemia    Renal cyst    Other male erectile dysfunction    Prostate cancer screening     Past Medical History:   Diagnosis Date    Anemia     Blind     r eye    Chronic cystitis     Colostomy in place [facility])     Detrusor sphincter dyssynergia     Diabetes mellitus ([facility] )     Poorly controlled type 2; Last Assessed:  3/18/14    Erectile dysfunction     Frequency of urination     GERD (gastroesophageal reflux disease)     History of diabetes mellitus     History of osteomyelitis     Hx of leg amputation ([facility])     r high upper leg    Hyperlipidemia     Hypertension     Hypospadias     Incomplete bladder emptying     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([facility] ) 9/16/2019    Neurogenic bladder     Paralysis ([facility] )     Paraplegia ([facility])     Spinal cord cysts     Ulcer of sacral region ([facility] )     Urge incontinence      Past Surgical History:   Procedure Laterality Date    AMPUTATION      At hip; Last Assessed:  1/19/16    BLADDER SURGERY      COLON SURGERY      llq ostomy pouch    COLOSTOMY      COMPLEX CYSTOMETROGRAM  2014    CT CYSTOGRAM  9/19/2019    CYSTOSCOPY  2014    IR PICC LINE REPOSITION  9/23/2019    IR SUPRAPUBIC CATHETER PLACEMENT  9/19/2019    LEG AMPUTATION      MEATOTOMY      PENILE PROSTHESIS  REMOVAL N/A 11/1/2019    Procedure: REMOVAL PROSTHESIS PENILE;  Surgeon: Sadie Ballesteros MD;  Location: [facility];  Service: Urology    PENILE PROSTHESIS IMPLANT  2011    VT ADJ TISS XFER SCALP,EXTREM 10 1-30 SQCM Left 5/1/2017    Procedure: POSTERIOR THIGH V-Y ADMANCEMENT;  Surgeon: Cy Castelan MD;  [facility];  Service: Plastics    VT MUSCLE-SKIN FLAP,TRUNK Left 5/1/2017    Procedure: FLAP CLOSURE LEFT ISCHIAL WOUND and "RIGHT" ISCHIAL FLAP ADVANCEMENT * DEBRIDEMENT, [place] ;  Surgeon: Cy Castelan MD; [facility];  Service: Plastics    DE MUSCLE-SKIN FLAP,TRUNK Left 2017    Procedure: gluteal myocutaneous rotational flap, posterior thigh v to y advancement- wound 5 x 2 5 x 8;  Surgeon: Daquan Zamora MD;  [facility];  Service: Plastics    SPINE SURGERY      Lower back    UROFLOWMETRY SIMPLE / COMPLEX       Family History   Problem Relation Age of Onset    No Known Problems Mother     No Known Problems Father      Social History     Socioeconomic History    Marital status: /Civil Union     Spouse name: None    Number of children: None    Years of education: None    Highest education level: None   Occupational History    None   Social Needs    Financial resource strain: None    Food insecurity     Worry: None     Inability: None    Transportation needs     Medical: None     Non-medical: None   Tobacco Use    Smoking status: Former Smoker     Packs/day: 0 50     Years: 10 00     Pack years: 5 00     Quit date:      Years since quittin 0    Smokeless tobacco: Never Used    Tobacco comment: Onset date:  11/10/17   Substance and Sexual Activity    Alcohol use: Yes     Frequency: 2-4 times a month     Comment: Per [employer]:  Social drinker (Onset date:  11/10/17)    Drug use: No    Sexual activity: None   Lifestyle    Physical activity     Days per week: None     Minutes per session: None    Stress: None   Relationships    Social connections     Talks on phone: None     Gets together: None     Attends Nondenominational service: None     Active member of club or organization: None     Attends meetings of clubs or organizations: None     Relationship status: None    Intimate partner violence     Fear of current or ex partner: None     Emotionally abused: None     Physically abused: None     Forced sexual activity: None   Other Topics Concern    None   Social History Narrative    [facility] language [address] [place] [address] Dr Ontiveros    Social history reviewed, unchanged Current Outpatient Medications:     ACCU-CHEK FASTCLIX LANCETS MISC, USE TWICE DAILY (Patient not taking: Reported on 2/26/2020), Disp: 102 each, Rfl: 0    Accu-Chek FastClix Lancets MISC, USE AS DIRECTED, Disp: 306 each, Rfl: 2    ACCU-CHEK SMARTVIEW test strip, TEST TWICE DAILY (Patient not taking: Reported on 2/26/2020), Disp: 100 each, Rfl: 0    acetaminophen (TYLENOL) 325 mg tablet, Take 2 tablets (650 mg total) by mouth every 6 (six) hours as needed for mild pain, headaches or fever (Patient not taking: Reported on 7/22/2020), Disp: 30 tablet, Rfl: 0    ASPIRIN LOW DOSE 81 MG EC tablet, TAKE 1 TABLET BY MOUTH EVERY DAY (Patient not taking: Reported on 7/22/2020), Disp: 30 tablet, Rfl: 0    Blood Glucose Monitoring Suppl (ACCU-CHEK OSEI SMARTVIEW) w/Device KIT, by Does not apply route daily, Disp: 1 kit, Rfl: 0    Blood Glucose Monitoring Suppl (ONE TOUCH ULTRA 2) w/Device KIT, by Does not apply route daily, Disp: 100 each, Rfl: 3    Disposable Gloves (LATEX GLOVES LARGE) MISC, Use as needed up to 3 times a day dispo 2 boxes, Disp: 2 each, Rfl: 11    glucose blood (OneTouch Ultra) test strip, 1 each by Other route daily Test daily, Disp: 100 each, Rfl: 3    ibuprofen (MOTRIN) 800 mg tablet, TAKE 1 TABLET(800 MG) BY MOUTH DAILY AS NEEDED FOR MODERATE PAIN, Disp: 60 tablet, Rfl: 0    Incontinence Supply Disposable (SUNBEAM INCONTINENT UNDER PAD) MISC, Use 1 per week, dispense 4 reusable underpads, Disp: 4 each, Rfl: 11    Incontinence Supply Disposable MISC, by Does not apply route 2 (two) times a day, Disp: 60 each, Rfl: 11    naloxone (NARCAN) 4 mg/0 1 mL nasal spray, Administer 1 spray into a nostril   If no response after 2-3 minutes, give another dose in the other nostril using a new spray , Disp: 1 each, Rfl: 1    Nutritional Supplements (GLUCERNA SHAKE) LIQD, Take 3 Cans by mouth 3 (three) times a day, Disp: 90 Can, Rfl: 11    omeprazole (PriLOSEC) 20 mg delayed release capsule, Take 1 capsule (20 mg total) by mouth daily (Patient not taking: Reported on 2/26/2020), Disp: 90 capsule, Rfl: 0    omeprazole (PriLOSEC) 40 MG capsule, TAKE 1 CAPSULE(40 MG) BY MOUTH DAILY, Disp: 90 capsule, Rfl: 0    OneTouch Delica Lancets 13T MISC, by Does not apply route daily, Disp: 1 each, Rfl: 0    oxyCODONE (ROXICODONE) 20 MG TABS, Take 1 tablet (20 mg total) by mouth 3 (three) times a day as needed for moderate painMax Daily Amount: 60 mg, Disp: 90 tablet, Rfl: 0    SODIUM HYPOCHLORITE 0 25 percent, USE UTD, Disp: , Rfl:     Current Facility-Administered Medications:     lidocaine (XYLOCAINE) 4 % topical solution 5 mL, 5 mL, Topical, Once, Iva Rodriguez MD    Review of Systems   Constitutional: Negative for activity change, appetite change, chills, fatigue and fever  HENT: Negative for congestion, hearing loss and postnasal drip  Eyes: Negative for visual disturbance  Respiratory: Negative for cough and shortness of breath  Cardiovascular: Negative for chest pain and leg swelling  Gastrointestinal: Negative for abdominal pain, constipation, diarrhea and nausea  Endocrine: Negative  Genitourinary: Negative for dysuria and urgency  Musculoskeletal: Positive for gait problem (Nonambulatory)  Skin: Positive for wound (Left hip and sacrum)  Negative for rash  Neurological: Positive for weakness (Paraplegic)  Paraplegia and neuropathy   Hematological: Does not bruise/bleed easily  Psychiatric/Behavioral: Negative  Objective:  /66   Pulse 80   Temp (!) 97 °F (36 1 °C)   Resp 20   Pain Score:   8     Physical Exam  Vitals signs and nursing note reviewed  Constitutional:       Appearance: Normal appearance  He is well-developed and normal weight  HENT:      Head: Normocephalic and atraumatic  Skin:     General: Skin is warm and dry  Findings: Wound present               Comments: Sacral/left buttock ulcer has clean, granular tissue but with significant undermining and some tunneling  No significant change  The left hip ulcer is slightly improved with fibrin and slough in the base  Neurological:      Mental Status: He is alert and oriented to person, place, and time  Psychiatric:         Attention and Perception: Attention normal          Mood and Affect: Mood and affect normal          Behavior: Behavior is cooperative  Cognition and Memory: Cognition normal          Wound 08/26/20 Buttocks Left (Active)   Wound Image   01/21/21 1031   Wound Description Epithelialization;Granulation tissue 01/21/21 1050   Pressure Injury Stage 4 01/21/21 1050   Shelby-wound Assessment Scar Tissue; Intact 01/21/21 1050   Wound Length (cm) 4 cm 01/21/21 1050   Wound Width (cm) 1 5 cm 01/21/21 1050   Wound Depth (cm) 1 5 cm 01/21/21 1050   Wound Surface Area (cm^2) 6 cm^2 01/21/21 1050   Wound Volume (cm^3) 9 cm^3 01/21/21 1050   Calculated Wound Volume (cm^3) 9 cm^3 01/21/21 1050   Change in Wound Size % 68 75 01/21/21 1050   Tunneling 4 5 cm 08/26/20 1103   Tunneling in depth located at 3 5cm 5 OCLOCK 01/21/21 1050   Undermining 5 5 01/21/21 1050   Undermining is depth extending from 4-5 and 7-12  5 5cm at 12 01/21/21 1050   Drainage Amount Moderate 01/21/21 1050   Drainage Description Serosanguineous 01/21/21 1050   Non-staged Wound Description Full thickness 01/21/21 1050   Treatments Cleansed 01/21/21 1050   Wound packed? Yes 01/21/21 1050   Packing- # removed 1 11/05/20 1036   Dressing Changed Changed 01/21/21 1050   Patient Tolerance Tolerated well 01/21/21 1050   Dressing Status Removed 01/21/21 1050       Wound 08/26/20 Hip Left (Active)   Wound Image   01/21/21 1119   Wound Description Granulation tissue;Slough 01/21/21 1052   Pressure Injury Stage 3 01/21/21 1052   Shelby-wound Assessment Scar Tissue; Intact 01/21/21 1052   Wound Length (cm) 1 3 cm 01/21/21 1052   Wound Width (cm) 2 3 cm 01/21/21 1052   Wound Depth (cm) 0 3 cm 01/21/21 1052   Wound Surface Area (cm^2) 2 99 cm^2 01/21/21 1052   Wound Volume (cm^3) 0 9 cm^3 01/21/21 1052   Calculated Wound Volume (cm^3) 0 9 cm^3 01/21/21 1052   Change in Wound Size % -291 3 01/21/21 1052   Drainage Amount Moderate 01/21/21 1052   Drainage Description Sanguineous 01/21/21 1052   Non-staged Wound Description Full thickness 01/21/21 1052   Treatments Cleansed 01/21/21 1052   Dressing Changed Changed 11/05/20 1038   Patient Tolerance Tolerated well 01/21/21 1052   Dressing Status Removed 01/21/21 1052                            Debridement   Wound 08/26/20 Hip Left    Universal Protocol:  Consent: Verbal consent obtained  Written consent obtained  Consent given by: patient  Time out: Immediately prior to procedure a "time out" was called to verify the correct patient, procedure, equipment, support staff and site/side marked as required  Patient identity confirmed: verbally with patient      Performed by: physician  Debridement type: selective  Pain control: lidocaine 4%  Post-debridement measurements  Length (cm): 1 3  Width (cm): 2 3  Depth (cm): 0 3  Percent debrided: 100%  Surface Area (cm^2): 2 99  Area debrided (cm^2): 2 99  Volume (cm^3): 0 9  Devitalized tissue debrided: fibrin and slough  Instrument(s) utilized: curette  Bleeding: small  Hemostasis obtained with: pressure  Procedural pain (0-10): 0  Post-procedural pain: 0   Response to treatment: procedure was tolerated well                   Wound Instructions:  Orders Placed This Encounter   Procedures    Wound cleansing and dressings     Wound cleansing and dressings           Left buttock  Cleanse/Irrigate wound with Normal Saline  - Please ensure the tunnel at 7-12 oclock and at 4-5 oclock are irrigated well with NSS  Cleanse gaby-wound skin with pH balanced soap and water  Apply primary dressing:  Pack with saline moistened gauze   IF wound starts to look worse resume dakins packing     Apply secondary dressing: - 4x4s, optilock, then ABD pad  Secure with: - medfix tape  Change dressing every other day and prn if soiled       Wound Left Hip   Cleanse/Irrigate wound with Normal Saline  Do not get dressing wet  Apply primary dressing: - dermagran gauze  Apply secondary dressing: - 4x4 gauze, optilock, then ABD pad  Secure with: - medfix tape  Change dressing every other day and prn if soiled       HOME CARE  Continue home care services/skilled nursing - 3 x per week  Open, draining complex wounds     Additional Orders:     OFF-LOADING  Avoid pressure at wound site  - all wounds, including healed L medial knee  Wheelchair Cushion  - ROHO cushion  Do Not Sit for Long Periods of Time  - 1 hr max for meals only, otherwise in bed and turn side to side at least every 3 hours or more frequently  Resume Off Loading Mattress - air mattress  Reposition in regular bed for now at least every 1-2 hours while awake       FOLLOW-UP APPOINTMENTS  Return Appointment in: - 4 weeks     MISC/ADDITIONAL ORDERS  Continue all home medications unless otherwise instructed  Increase dietary Protein intake - Increase your protein with food: chicken, beef, beans, cottage cheese, nuts  Continue Ryan to help increase protein     Standing Status:   Future     Standing Expiration Date:   1/21/2022    Debridement     This order was created via procedure documentation         Jewell Louise MD, CHT, CWS       Portions of the record may have been created with voice recognition software  Occasional wrong word or "sound alike" substitutions may have occurred due to the inherent limitations of voice recognition software  Read the chart carefully and recognize, using context, where substitutions have occurred

## 2021-01-25 ENCOUNTER — OFFICE VISIT (OUTPATIENT)
Dept: FAMILY MEDICINE CLINIC | Facility: CLINIC | Age: 61
End: 2021-01-25

## 2021-01-25 VITALS
DIASTOLIC BLOOD PRESSURE: 82 MMHG | RESPIRATION RATE: 18 BRPM | OXYGEN SATURATION: 98 % | HEART RATE: 50 BPM | TEMPERATURE: 98.2 F | SYSTOLIC BLOOD PRESSURE: 130 MMHG

## 2021-01-25 DIAGNOSIS — E11.9 TYPE 2 DIABETES MELLITUS WITHOUT COMPLICATION, WITHOUT LONG-TERM CURRENT USE OF INSULIN (HCC): Primary | ICD-10-CM

## 2021-01-25 DIAGNOSIS — G82.20 PARAPLEGIC SPINAL PARALYSIS (HCC): ICD-10-CM

## 2021-01-25 LAB — SL AMB POCT HEMOGLOBIN AIC: 5.6 (ref ?–6.5)

## 2021-01-25 PROCEDURE — 83036 HEMOGLOBIN GLYCOSYLATED A1C: CPT | Performed by: FAMILY MEDICINE

## 2021-01-25 PROCEDURE — 99214 OFFICE O/P EST MOD 30 MIN: CPT | Performed by: FAMILY MEDICINE

## 2021-01-25 NOTE — PROGRESS NOTES
Assessment/Plan:    No problem-specific Assessment & Plan notes found for this encounter  Diagnoses and all orders for this visit:    Type 2 diabetes mellitus without complication, without long-term current use of insulin ([facility])  -     Microalbumin / creatinine urine ratio  -     POCT hemoglobin A1c  -     CBC and differential; Future  -     Comprehensive metabolic panel; Future  -     Lipid panel; Future  -     TSH, 3rd generation with Free T4 reflex; Future    Paraplegic spinal paralysis [facility])        Patient requested increase on Oxycodone dose, however we discussed I do not feel comfortable increasing his dose at this time  Offered referral to Pain Management, but patient refuses  States he believes that once his open wounds heal his pain will improve as well   Subjective:      Patient ID: Bessie Rivas is a 62 y o  male  63 yo  male with multiple medical comorbidities including paraplegic spinal paralysis with R leg disarticulation at the hip, left ischium wound which required skin flap  He had subsequently undergone penile prosthesis by Dr Castillo Shallow  He underwent explantation of the prosthesis 11/4/2019 due to infection and erosion  Currently doing well  However states pain is 6-7/10 despite Oxycodone 20 mg TID  Pain is on perineal area and lower back  Quality of pain unchanged         The following portions of the patient's history were reviewed and updated as appropriate:   He  has a past medical history of Anemia, Blind, Chronic cystitis, Colostomy in place [facility]), Detrusor sphincter dyssynergia, Diabetes mellitus ([facility] [address] ), Erectile dysfunction, Frequency of urination, GERD (gastroesophageal reflux disease), History of diabetes mellitus, History of osteomyelitis, leg amputation ([facility] [address] ), Hyperlipidemia, Hypertension, Hypospadias, Incomplete bladder emptying, 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([facility] [address] ) (9/16/2019), Neurogenic bladder, Paralysis ([facility] [address] ), Paraplegia ([address] ), Spinal cord cysts, Ulcer of sacral region [facility]), and Urge incontinence  He   Patient Active Problem List    Diagnosis Date Noted    Renal cyst 11/18/2019    Other male erectile dysfunction 11/18/2019    Prostate cancer screening 11/18/2019    Hypokalemia 10/29/2019    SBO (small bowel obstruction) ([facility] [address] ) 10/28/2019    Sepsis ([facility] [address] ) 10/28/2019    Stage II pressure ulcer of buttock ([address] ) 09/17/2019    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, stage 3 ([address] ) 09/16/2019    Hyperkalemia 09/14/2019    History of Clostridium difficile colitis 08/25/2019    Neurogenic bladder 08/23/2019    Sepsis with hypotension ([address] ) 08/23/2019    Osteomyelitis of pelvic region ([facility] [address] ) 05/06/2019    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) 04/23/2019    Colon cancer screening 04/23/2019    Dyspepsia 04/23/2019    Epigastric pain 04/23/2019    Chronic, continuous use of opioids 01/15/2019    Decubitus ulcer of ischium, left, stage IV ([facility] [address] ) 11/12/2018    Diarrhea 09/21/2018    Amputated right leg ([address] ) 07/18/2018    Anxiety 04/23/2018    GERD (gastroesophageal reflux disease)     History of osteomyelitis     Cyst of joint of shoulder 10/20/2016    Anemia 10/20/2016    Paraplegic spinal paralysis ([facility] [address] ) 10/20/2016    Sinus tachycardia 10/20/2016    Stage IV pressure ulcer of sacral region ([facility] [address] ) 10/15/2016    Stage IV pressure ulcer of left heel ([facility] [address] ) 10/15/2016    Diabetes mellitus type II, controlled ([facility] [address] ) 03/07/2014    Benign essential hypertension 07/31/2013     He  has a past surgical history that includes Spine surgery; Leg amputation; Bladder surgery; Colostomy; Amputation; pr adj tiss xfer scalp,extrem 10 1-30 sqcm (Left, 5/1/2017); pr muscle-skin flap,trunk (Left, 5/1/2017); Colon surgery; pr muscle-skin flap,trunk (Left, 9/27/2017); Complex cystometrogram (2014); Uroflowmetry simple / complex (2014); Cystoscopy (2014); Penile prosthesis implant (2011); Meatotomy; CT cystogram (9/19/2019);  IR suprapubic catheter placement (9/19/2019); IR PICC line reposition (9/23/2019); and Penile prosthesis removal (N/A, 11/1/2019)  His family history includes No Known Problems in his father and mother  He  reports that he quit smoking about 34 years ago  He has a 5 00 pack-year smoking history  He has never used smokeless tobacco  He reports current alcohol use  He reports that he does not use drugs  Current Outpatient Medications   Medication Sig Dispense Refill    ACCU-CHEK FASTCLIX LANCETS MISC USE TWICE DAILY (Patient not taking: Reported on 2/26/2020) 102 each 0    Accu-Chek FastClix Lancets MISC USE AS DIRECTED 306 each 2    ACCU-CHEK SMARTVIEW test strip TEST TWICE DAILY (Patient not taking: Reported on 2/26/2020) 100 each 0    acetaminophen (TYLENOL) 325 mg tablet Take 2 tablets (650 mg total) by mouth every 6 (six) hours as needed for mild pain, headaches or fever (Patient not taking: Reported on 7/22/2020) 30 tablet 0    ASPIRIN LOW DOSE 81 MG EC tablet TAKE 1 TABLET BY MOUTH EVERY DAY (Patient not taking: Reported on 7/22/2020) 30 tablet 0    Blood Glucose Monitoring Suppl (ACCU-CHEK OSEI SMARTVIEW) w/Device KIT by Does not apply route daily 1 kit 0    Blood Glucose Monitoring Suppl (ONE TOUCH ULTRA 2) w/Device KIT by Does not apply route daily 100 each 3    Disposable Gloves (LATEX GLOVES LARGE) MISC Use as needed up to 3 times a day dispo 2 boxes 2 each 11    glucose blood (OneTouch Ultra) test strip 1 each by Other route daily Test daily 100 each 3    ibuprofen (MOTRIN) 800 mg tablet TAKE 1 TABLET(800 MG) BY MOUTH DAILY AS NEEDED FOR MODERATE PAIN 60 tablet 0    Incontinence Supply Disposable (SUNBEAM INCONTINENT UNDER PAD) MISC Use 1 per week, dispense 4 reusable underpads 4 each 11    Incontinence Supply Disposable MISC by Does not apply route 2 (two) times a day 60 each 11    naloxone (NARCAN) 4 mg/0 1 mL nasal spray Administer 1 spray into a nostril   If no response after 2-3 minutes, give another dose in the other nostril using a new spray  1 each 1    Nutritional Supplements (GLUCERNA SHAKE) LIQD Take 3 Cans by mouth 3 (three) times a day 90 Can 11    omeprazole (PriLOSEC) 20 mg delayed release capsule Take 1 capsule (20 mg total) by mouth daily (Patient not taking: Reported on 2/26/2020) 90 capsule 0    omeprazole (PriLOSEC) 40 MG capsule TAKE 1 CAPSULE(40 MG) BY MOUTH DAILY 90 capsule 0    OneTouch Delica Lancets 45T MISC by Does not apply route daily 1 each 0    oxyCODONE (ROXICODONE) 20 MG TABS Take 1 tablet (20 mg total) by mouth 3 (three) times a day as needed for moderate painMax Daily Amount: 60 mg 90 tablet 0    SODIUM HYPOCHLORITE 0 25 percent USE UTD       No current facility-administered medications for this visit        Current Outpatient Medications on File Prior to Visit   Medication Sig    ACCU-CHEK FASTCLIX LANCETS MISC USE TWICE DAILY (Patient not taking: Reported on 2/26/2020)    Accu-Chek FastClix Lancets MISC USE AS DIRECTED    ACCU-CHEK SMARTVIEW test strip TEST TWICE DAILY (Patient not taking: Reported on 2/26/2020)    acetaminophen (TYLENOL) 325 mg tablet Take 2 tablets (650 mg total) by mouth every 6 (six) hours as needed for mild pain, headaches or fever (Patient not taking: Reported on 7/22/2020)    ASPIRIN LOW DOSE 81 MG EC tablet TAKE 1 TABLET BY MOUTH EVERY DAY (Patient not taking: Reported on 7/22/2020)    Blood Glucose Monitoring Suppl (ACCU-CHEK OSEI SMARTVIEW) w/Device KIT by Does not apply route daily    Blood Glucose Monitoring Suppl (ONE TOUCH ULTRA 2) w/Device KIT by Does not apply route daily    Disposable Gloves (LATEX GLOVES LARGE) MISC Use as needed up to 3 times a day dispo 2 boxes    glucose blood (OneTouch Ultra) test strip 1 each by Other route daily Test daily    ibuprofen (MOTRIN) 800 mg tablet TAKE 1 TABLET(800 MG) BY MOUTH DAILY AS NEEDED FOR MODERATE PAIN    Incontinence Supply Disposable (SUNBEAM INCONTINENT UNDER PAD) MISC Use 1 per week, dispense 4 reusable underpads    Incontinence Supply Disposable MISC by Does not apply route 2 (two) times a day    naloxone (NARCAN) 4 mg/0 1 mL nasal spray Administer 1 spray into a nostril  If no response after 2-3 minutes, give another dose in the other nostril using a new spray   Nutritional Supplements (GLUCERNA SHAKE) LIQD Take 3 Cans by mouth 3 (three) times a day    omeprazole (PriLOSEC) 20 mg delayed release capsule Take 1 capsule (20 mg total) by mouth daily (Patient not taking: Reported on 2/26/2020)    omeprazole (PriLOSEC) 40 MG capsule TAKE 1 CAPSULE(40 MG) BY MOUTH DAILY    OneTouch Delica Lancets 68W MISC by Does not apply route daily    oxyCODONE (ROXICODONE) 20 MG TABS Take 1 tablet (20 mg total) by mouth 3 (three) times a day as needed for moderate painMax Daily Amount: 60 mg    SODIUM HYPOCHLORITE 0 25 percent USE UTD     No current facility-administered medications on file prior to visit       Review of Systems   Musculoskeletal: Positive for back pain and gait problem (paraplegic)  Psychiatric/Behavioral: Positive for sleep disturbance  All other systems reviewed and are negative  Objective:      /82 (BP Location: Left arm, Patient Position: Sitting, Cuff Size: Adult)   Pulse (!) 50   Temp 98 2 °F (36 8 °C)   Resp 18   SpO2 98%          Physical Exam  Vitals signs and nursing note reviewed  Constitutional:       Appearance: He is well-developed  HENT:      Head: Normocephalic  Right Ear: External ear normal       Left Ear: External ear normal       Nose: Nose normal    Eyes:      Conjunctiva/sclera: Conjunctivae normal       Pupils: Pupils are equal, round, and reactive to light  Neck:      Musculoskeletal: Normal range of motion and neck supple  Thyroid: No thyromegaly  Cardiovascular:      Rate and Rhythm: Regular rhythm  Bradycardia present  Heart sounds: Normal heart sounds     Pulmonary:      Effort: Pulmonary effort is normal       Breath sounds: Normal breath sounds  Abdominal:      Palpations: Abdomen is soft  Tenderness: There is no abdominal tenderness  There is no guarding or rebound  Skin:     General: Skin is dry  Neurological:      Mental Status: He is alert and oriented to person, place, and time  Deep Tendon Reflexes: Reflexes are normal and symmetric

## 2021-01-29 DIAGNOSIS — M54.50 CHRONIC LOW BACK PAIN WITHOUT SCIATICA, UNSPECIFIED BACK PAIN LATERALITY: ICD-10-CM

## 2021-01-29 DIAGNOSIS — G89.29 CHRONIC LOW BACK PAIN WITHOUT SCIATICA, UNSPECIFIED BACK PAIN LATERALITY: ICD-10-CM

## 2021-01-29 RX ORDER — IBUPROFEN 800 MG/1
TABLET ORAL
Qty: 60 TABLET | Refills: 0 | Status: SHIPPED | OUTPATIENT
Start: 2021-01-29 | End: 2021-02-18 | Stop reason: SDUPTHER

## 2021-02-01 DIAGNOSIS — G89.29 CHRONIC LOW BACK PAIN WITHOUT SCIATICA, UNSPECIFIED BACK PAIN LATERALITY: ICD-10-CM

## 2021-02-01 DIAGNOSIS — M54.50 CHRONIC LOW BACK PAIN WITHOUT SCIATICA, UNSPECIFIED BACK PAIN LATERALITY: ICD-10-CM

## 2021-02-01 DIAGNOSIS — L89.324 STAGE IV PRESSURE ULCER OF LEFT BUTTOCK (HCC): ICD-10-CM

## 2021-02-03 RX ORDER — OXYCODONE HYDROCHLORIDE 20 MG/1
20 TABLET ORAL 3 TIMES DAILY PRN
Qty: 90 TABLET | Refills: 0 | Status: SHIPPED | OUTPATIENT
Start: 2021-02-03 | End: 2021-03-02 | Stop reason: SDUPTHER

## 2021-02-11 ENCOUNTER — TRANSCRIBE ORDERS (OUTPATIENT)
Dept: ADMINISTRATIVE | Facility: HOSPITAL | Age: 61
End: 2021-02-11

## 2021-02-11 ENCOUNTER — LAB (OUTPATIENT)
Dept: LAB | Facility: HOSPITAL | Age: 61
End: 2021-02-11
Attending: UROLOGY
Payer: MEDICARE

## 2021-02-11 ENCOUNTER — APPOINTMENT (OUTPATIENT)
Dept: LAB | Facility: HOSPITAL | Age: 61
End: 2021-02-11
Attending: UROLOGY
Payer: MEDICARE

## 2021-02-11 DIAGNOSIS — R97.20 ELEVATED PSA: ICD-10-CM

## 2021-02-11 DIAGNOSIS — R97.20 ELEVATED PSA: Primary | ICD-10-CM

## 2021-02-11 DIAGNOSIS — E11.9 TYPE 2 DIABETES MELLITUS WITHOUT COMPLICATION, WITHOUT LONG-TERM CURRENT USE OF INSULIN (HCC): ICD-10-CM

## 2021-02-11 LAB
ALBUMIN SERPL BCP-MCNC: 2.9 G/DL (ref 3.5–5)
ALP SERPL-CCNC: 95 U/L (ref 46–116)
ALT SERPL W P-5'-P-CCNC: 14 U/L (ref 12–78)
ANION GAP SERPL CALCULATED.3IONS-SCNC: 6 MMOL/L (ref 4–13)
AST SERPL W P-5'-P-CCNC: 12 U/L (ref 5–45)
BASOPHILS # BLD AUTO: 0.05 THOUSANDS/ΜL (ref 0–0.1)
BASOPHILS NFR BLD AUTO: 1 % (ref 0–1)
BILIRUB SERPL-MCNC: 0.11 MG/DL (ref 0.2–1)
BUN SERPL-MCNC: 14 MG/DL (ref 5–25)
CALCIUM ALBUM COR SERPL-MCNC: 10 MG/DL (ref 8.3–10.1)
CALCIUM SERPL-MCNC: 9.1 MG/DL (ref 8.3–10.1)
CHLORIDE SERPL-SCNC: 103 MMOL/L (ref 100–108)
CHOLEST SERPL-MCNC: 137 MG/DL (ref 50–200)
CO2 SERPL-SCNC: 27 MMOL/L (ref 21–32)
CREAT SERPL-MCNC: 0.53 MG/DL (ref 0.6–1.3)
CREAT UR-MCNC: 66.6 MG/DL
EOSINOPHIL # BLD AUTO: 0.14 THOUSAND/ΜL (ref 0–0.61)
EOSINOPHIL NFR BLD AUTO: 2 % (ref 0–6)
ERYTHROCYTE [DISTWIDTH] IN BLOOD BY AUTOMATED COUNT: 19.7 % (ref 11.6–15.1)
GFR SERPL CREATININE-BSD FRML MDRD: 115 ML/MIN/1.73SQ M
GLUCOSE P FAST SERPL-MCNC: 85 MG/DL (ref 65–99)
HCT VFR BLD AUTO: 40 % (ref 36.5–49.3)
HDLC SERPL-MCNC: 39 MG/DL
HGB BLD-MCNC: 11.9 G/DL (ref 12–17)
IMM GRANULOCYTES # BLD AUTO: 0.02 THOUSAND/UL (ref 0–0.2)
IMM GRANULOCYTES NFR BLD AUTO: 0 % (ref 0–2)
LDLC SERPL CALC-MCNC: 83 MG/DL (ref 0–100)
LYMPHOCYTES # BLD AUTO: 1.56 THOUSANDS/ΜL (ref 0.6–4.47)
LYMPHOCYTES NFR BLD AUTO: 26 % (ref 14–44)
MCH RBC QN AUTO: 23.1 PG (ref 26.8–34.3)
MCHC RBC AUTO-ENTMCNC: 29.8 G/DL (ref 31.4–37.4)
MCV RBC AUTO: 78 FL (ref 82–98)
MICROALBUMIN UR-MCNC: 80.2 MG/L (ref 0–20)
MICROALBUMIN/CREAT 24H UR: 120 MG/G CREATININE (ref 0–30)
MONOCYTES # BLD AUTO: 0.41 THOUSAND/ΜL (ref 0.17–1.22)
MONOCYTES NFR BLD AUTO: 7 % (ref 4–12)
NEUTROPHILS # BLD AUTO: 3.72 THOUSANDS/ΜL (ref 1.85–7.62)
NEUTS SEG NFR BLD AUTO: 64 % (ref 43–75)
NONHDLC SERPL-MCNC: 98 MG/DL
NRBC BLD AUTO-RTO: 0 /100 WBCS
PLATELET # BLD AUTO: 374 THOUSANDS/UL (ref 149–390)
PMV BLD AUTO: 10.6 FL (ref 8.9–12.7)
POTASSIUM SERPL-SCNC: 3.8 MMOL/L (ref 3.5–5.3)
PROT SERPL-MCNC: 9.1 G/DL (ref 6.4–8.2)
PSA SERPL-MCNC: 3.4 NG/ML (ref 0–4)
RBC # BLD AUTO: 5.15 MILLION/UL (ref 3.88–5.62)
SODIUM SERPL-SCNC: 136 MMOL/L (ref 136–145)
TRIGL SERPL-MCNC: 74 MG/DL
TSH SERPL DL<=0.05 MIU/L-ACNC: 1.17 UIU/ML (ref 0.36–3.74)
WBC # BLD AUTO: 5.9 THOUSAND/UL (ref 4.31–10.16)

## 2021-02-11 PROCEDURE — 80061 LIPID PANEL: CPT

## 2021-02-11 PROCEDURE — 82570 ASSAY OF URINE CREATININE: CPT | Performed by: FAMILY MEDICINE

## 2021-02-11 PROCEDURE — 85025 COMPLETE CBC W/AUTO DIFF WBC: CPT

## 2021-02-11 PROCEDURE — 82043 UR ALBUMIN QUANTITATIVE: CPT | Performed by: FAMILY MEDICINE

## 2021-02-11 PROCEDURE — 80053 COMPREHEN METABOLIC PANEL: CPT

## 2021-02-11 PROCEDURE — 36415 COLL VENOUS BLD VENIPUNCTURE: CPT

## 2021-02-11 PROCEDURE — 84153 ASSAY OF PSA TOTAL: CPT

## 2021-02-11 PROCEDURE — 84443 ASSAY THYROID STIM HORMONE: CPT

## 2021-02-18 DIAGNOSIS — K21.9 GASTROESOPHAGEAL REFLUX DISEASE: ICD-10-CM

## 2021-02-18 DIAGNOSIS — G89.29 CHRONIC LOW BACK PAIN WITHOUT SCIATICA, UNSPECIFIED BACK PAIN LATERALITY: ICD-10-CM

## 2021-02-18 DIAGNOSIS — M54.50 CHRONIC LOW BACK PAIN WITHOUT SCIATICA, UNSPECIFIED BACK PAIN LATERALITY: ICD-10-CM

## 2021-02-18 DIAGNOSIS — E11.9 TYPE 2 DIABETES MELLITUS WITHOUT COMPLICATION, WITHOUT LONG-TERM CURRENT USE OF INSULIN (HCC): ICD-10-CM

## 2021-02-18 RX ORDER — LANCETS 33 GAUGE
EACH MISCELLANEOUS DAILY
Qty: 1 EACH | Refills: 0 | Status: SHIPPED | OUTPATIENT
Start: 2021-02-18 | End: 2021-04-02 | Stop reason: SDUPTHER

## 2021-02-18 RX ORDER — BLOOD SUGAR DIAGNOSTIC
1 STRIP MISCELLANEOUS DAILY
Qty: 100 EACH | Refills: 0 | Status: SHIPPED | OUTPATIENT
Start: 2021-02-18 | End: 2021-05-13 | Stop reason: ALTCHOICE

## 2021-02-18 RX ORDER — IBUPROFEN 800 MG/1
800 TABLET ORAL EVERY 8 HOURS PRN
Qty: 60 TABLET | Refills: 0 | Status: SHIPPED | OUTPATIENT
Start: 2021-02-18 | End: 2021-05-20

## 2021-02-18 RX ORDER — OMEPRAZOLE 40 MG/1
40 CAPSULE, DELAYED RELEASE ORAL DAILY
Qty: 90 CAPSULE | Refills: 0 | Status: SHIPPED | OUTPATIENT
Start: 2021-02-18 | End: 2021-05-20

## 2021-02-22 ENCOUNTER — TELEPHONE (OUTPATIENT)
Dept: FAMILY MEDICINE CLINIC | Facility: CLINIC | Age: 61
End: 2021-02-22

## 2021-02-22 NOTE — TELEPHONE ENCOUNTER
SIGNATURES NEEDED FOR diabetic supply order FORM RECEIVED VIA FAX  WILL BE PLACED IN YOUR BIN AT ASSIGNED DELIVERY TIMES    * medicare part b detailed written order

## 2021-02-23 ENCOUNTER — OFFICE VISIT (OUTPATIENT)
Dept: UROLOGY | Facility: MEDICAL CENTER | Age: 61
End: 2021-02-23
Payer: MEDICARE

## 2021-02-23 DIAGNOSIS — N31.9 NEUROGENIC BLADDER: ICD-10-CM

## 2021-02-23 DIAGNOSIS — R97.20 ELEVATED PSA: Primary | ICD-10-CM

## 2021-02-23 PROCEDURE — 99213 OFFICE O/P EST LOW 20 MIN: CPT | Performed by: UROLOGY

## 2021-02-23 NOTE — PROGRESS NOTES
[address] [facility]  [facility]  [address] [facility], [address] [place] [address]  [phone]  www  [facility]  org      NAME: Rikki Arguello  AGE: 62 y o  SEX: male  : 1960   MRN: [id number]    DATE: 2021  TIME: 9:21 AM    Assessment and Plan:    Neurogenic bladder, now managed with suprapubic tube  Status post augmentation  He is dry  Check renal ultrasound in 1 year  No recent infections  He is on no urologic medications  Elevated PSA now normalized:  Recheck PSA in 1 year  Chief Complaint   No chief complaint on file  History of Present Illness   Neurogenic bladder due to spinal cord injury and paraplegia with history of bladder augmentation and appendical vesicostomy by me, and history of penile prosthesis placement which had to be explanted  He is now managed with an indwelling suprapubic tube change monthly and he is doing well with this  No recent infections  He looks the best I have seen him in quite a while  No recent hospitalizations  The suprapubic tube is changed by visiting nurse  Elevated PSA normalized:  PSA is down to 3 2 as of 2021, and was 4 4 2020    He has had this problem intermittently for years and underwent biopsy by Dr Lee Felix in the past     The following portions of the patient's history were reviewed and updated as appropriate: allergies, current medications, past family history, past medical history, past social history, past surgical history and problem list   Past Medical History:   Diagnosis Date    Anemia     Blind     r eye    Chronic cystitis     Colostomy in place [facility])     Detrusor sphincter dyssynergia     Diabetes mellitus ([facility] [address] )     Poorly controlled type 2; Last Assessed:  3/18/14    Erectile dysfunction     Frequency of urination     GERD (gastroesophageal reflux disease)     History of diabetes mellitus     History of osteomyelitis     Hx of leg amputation ([address] ) r high upper leg    Hyperlipidemia     Hypertension     Hypospadias     Incomplete bladder emptying     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([address] ) 9/16/2019    Neurogenic bladder     Paralysis (HCC)     Paraplegia (HCC)     Spinal cord cysts     Ulcer of sacral region [facility])     Urge incontinence      Past Surgical History:   Procedure Laterality Date    AMPUTATION      At hip; Last Assessed:  1/19/16    BLADDER SURGERY      COLON SURGERY      llq ostomy pouch    COLOSTOMY      COMPLEX CYSTOMETROGRAM  2014    CT CYSTOGRAM  9/19/2019    CYSTOSCOPY  2014    IR PICC LINE REPOSITION  9/23/2019    IR SUPRAPUBIC CATHETER PLACEMENT  9/19/2019    LEG AMPUTATION      MEATOTOMY      PENILE PROSTHESIS  REMOVAL N/A 11/1/2019    Procedure: REMOVAL PROSTHESIS PENILE;  Surgeon: Orland Osgood, MD;  Location: [facility];  Service: Urology    PENILE PROSTHESIS IMPLANT  2011    TX ADJ TISS XFER SCALP,EXTREM 10 1-30 SQCM Left 5/1/2017    Procedure: POSTERIOR THIGH V-Y ADMANCEMENT;  Surgeon: Yvette Sheehan MD;  [facility];  Service: Plastics    TX MUSCLE-SKIN FLAP,TRUNK Left 5/1/2017    Procedure: FLAP CLOSURE LEFT ISCHIAL WOUND and "RIGHT" ISCHIAL FLAP ADVANCEMENT * DEBRIDEMENT, VAC PLACEMENT ;  Surgeon: Yvette Sheehan MD;  [facility];  Service: Plastics    TX MUSCLE-SKIN FLAP,TRUNK Left 9/27/2017    Procedure: gluteal myocutaneous rotational flap, posterior thigh v to y advancement- wound 5 x 2 5 x 8;  Surgeon: Yvette Sheehan MD;  [facility];  Service: Plastics    SPINE SURGERY      Lower back    UROFLOWMETRY SIMPLE / COMPLEX  2014     shoulder  Review of Systems   Review of Systems   Constitutional: Negative for fever  Respiratory: Negative for shortness of breath  Cardiovascular: Negative for chest pain     Genitourinary:        As per HPI       Active Problem List     Patient Active Problem List   Diagnosis    Stage IV pressure ulcer of sacral region ([address] )    Stage IV pressure ulcer of left heel ([facility] [address] )    Cyst of joint of shoulder    Anemia    [facility]    Sinus tachycardia    GERD (gastroesophageal reflux disease)    History of osteomyelitis    Anxiety    [facility] right leg ([facility])    Benign essential hypertension    Diabetes mellitus type II, controlled [facility]    Diarrhea    Decubitus ulcer of ischium, left, stage IV ([facility])    Chronic, continuous use of opioids    Colostomy in place [facility])    Colon cancer screening    Dyspepsia    Epigastric pain    Osteomyelitis of pelvic region [facility])    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[facility])    Neurogenic bladder    Sepsis with hypotension ([facility])    History of Clostridium difficile colitis    Hyperkalemia    Stage II pressure ulcer of buttock ([facility])    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, stage 3 ([facility])    SBO (small bowel obstruction) ([facility])    Sepsis ([facility])    Hypokalemia    Renal cyst    Other male erectile dysfunction    Prostate cancer screening       Objective   There were no vitals taken for this visit  Physical Exam  Constitutional:       Appearance: Normal appearance  HENT:      Head: Normocephalic and atraumatic  Eyes:      Extraocular Movements: Extraocular movements intact  Neck:      Musculoskeletal: Normal range of motion  Pulmonary:      Effort: Pulmonary effort is normal    Abdominal:      Palpations: Abdomen is soft  Comments: Colostomy in place  Suprapubic tube in place  No sign of infection  Former vesicostomy site has healed over  Genitourinary:     Penis: Normal     Skin:     Coloration: Skin is not jaundiced  Neurological:      General: No focal deficit present  Mental Status: He is alert and oriented to person, place, and time  Psychiatric:         Mood and Affect: Mood normal          Behavior: Behavior normal          Thought Content:  Thought content normal          Judgment: Judgment normal              Current Medications     Current Outpatient Medications:     ACCU-CHEK FASTCLIX LANCETS MISC, USE TWICE DAILY, Disp: 102 each, Rfl: 0    Accu-Chek FastClix Lancets MISC, USE AS DIRECTED, Disp: 306 each, Rfl: 2    ACCU-CHEK SMARTVIEW test strip, TEST TWICE DAILY, Disp: 100 each, Rfl: 0    acetaminophen (TYLENOL) 325 mg tablet, Take 2 tablets (650 mg total) by mouth every 6 (six) hours as needed for mild pain, headaches or fever, Disp: 30 tablet, Rfl: 0    Blood Glucose Monitoring Suppl (ACCU-CHEK OSEI SMARTVIEW) w/Device KIT, by Does not apply route daily, Disp: 1 kit, Rfl: 0    Blood Glucose Monitoring Suppl (ONE TOUCH ULTRA 2) w/Device KIT, by Does not apply route daily, Disp: 100 each, Rfl: 3    Disposable Gloves (LATEX GLOVES LARGE) MISC, Use as needed up to 3 times a day dispo 2 boxes, Disp: 2 each, Rfl: 11    glucose blood (OneTouch Ultra) test strip, Use 1 each daily Test daily, Disp: 100 each, Rfl: 0    ibuprofen (MOTRIN) 800 mg tablet, Take 1 tablet (800 mg total) by mouth every 8 (eight) hours as needed for mild pain, Disp: 60 tablet, Rfl: 0    Incontinence Supply Disposable (SUNBEAM INCONTINENT UNDER PAD) MISC, Use 1 per week, dispense 4 reusable underpads, Disp: 4 each, Rfl: 11    Incontinence Supply Disposable MISC, by Does not apply route 2 (two) times a day, Disp: 60 each, Rfl: 11    Nutritional Supplements (GLUCERNA SHAKE) LIQD, Take 3 Cans by mouth 3 (three) times a day, Disp: 90 Can, Rfl: 11    omeprazole (PriLOSEC) 40 MG capsule, Take 1 capsule (40 mg total) by mouth daily, Disp: 90 capsule, Rfl: 0    OneTouch Delica Lancets 11T MISC, Use daily, Disp: 1 each, Rfl: 0    oxyCODONE (ROXICODONE) 20 MG TABS, Take 1 tablet (20 mg total) by mouth 3 (three) times a day as needed for moderate painMax Daily Amount: 60 mg, Disp: 90 tablet, Rfl: 0    ASPIRIN LOW DOSE 81 MG EC tablet, TAKE 1 TABLET BY MOUTH EVERY DAY (Patient not taking: Reported on 7/22/2020), Disp: 30 tablet, Rfl: 0    naloxone (NARCAN) 4 mg/0 1 mL nasal spray, Administer 1 spray into a nostril  If no response after 2-3 minutes, give another dose in the other nostril using a new spray   (Patient not taking: Reported on 2/23/2021), Disp: 1 each, Rfl: 1    omeprazole (PriLOSEC) 20 mg delayed release capsule, Take 1 capsule (20 mg total) by mouth daily (Patient not taking: Reported on 2/26/2020), Disp: 90 capsule, Rfl: 0    SODIUM HYPOCHLORITE 0 25 percent, USE UTD, Disp: , Rfl:         Tony Oliveira MD

## 2021-03-02 DIAGNOSIS — L89.324 STAGE IV PRESSURE ULCER OF LEFT BUTTOCK (HCC): ICD-10-CM

## 2021-03-02 DIAGNOSIS — M54.50 CHRONIC LOW BACK PAIN WITHOUT SCIATICA, UNSPECIFIED BACK PAIN LATERALITY: ICD-10-CM

## 2021-03-02 DIAGNOSIS — G89.29 CHRONIC LOW BACK PAIN WITHOUT SCIATICA, UNSPECIFIED BACK PAIN LATERALITY: ICD-10-CM

## 2021-03-03 ENCOUNTER — OFFICE VISIT (OUTPATIENT)
Dept: WOUND CARE | Facility: HOSPITAL | Age: 61
End: 2021-03-03
Payer: MEDICARE

## 2021-03-03 VITALS
SYSTOLIC BLOOD PRESSURE: 140 MMHG | HEART RATE: 60 BPM | TEMPERATURE: 97.5 F | RESPIRATION RATE: 18 BRPM | DIASTOLIC BLOOD PRESSURE: 90 MMHG

## 2021-03-03 DIAGNOSIS — L89.223 STAGE III PRESSURE ULCER OF LEFT HIP (HCC): ICD-10-CM

## 2021-03-03 DIAGNOSIS — L89.44 PRESSURE INJURY OF CONTIGUOUS REGION INVOLVING BACK AND LEFT BUTTOCK, STAGE 4 (HCC): Primary | ICD-10-CM

## 2021-03-03 PROCEDURE — 99213 OFFICE O/P EST LOW 20 MIN: CPT | Performed by: FAMILY MEDICINE

## 2021-03-03 PROCEDURE — 97597 DBRDMT OPN WND 1ST 20 CM/<: CPT | Performed by: FAMILY MEDICINE

## 2021-03-03 RX ORDER — OXYCODONE HYDROCHLORIDE 20 MG/1
20 TABLET ORAL 3 TIMES DAILY PRN
Qty: 90 TABLET | Refills: 0 | Status: SHIPPED | OUTPATIENT
Start: 2021-03-03 | End: 2021-04-05 | Stop reason: SDUPTHER

## 2021-03-03 RX ORDER — LIDOCAINE HYDROCHLORIDE 40 MG/ML
5 SOLUTION TOPICAL ONCE
Status: COMPLETED | OUTPATIENT
Start: 2021-03-03 | End: 2021-03-03

## 2021-03-03 RX ADMIN — LIDOCAINE HYDROCHLORIDE 5 ML: 40 SOLUTION TOPICAL at 11:18

## 2021-03-03 NOTE — PROGRESS NOTES
Patient ID: Jerald Shahid is a 62 y o  male Date of Birth 1960       Chief Complaint   Patient presents with    Follow Up Wound Care Visit     Left buttock and L hip wounds  Patient reports he has appointment with plastice next week       Allergies:  Patient has no known allergies  Diagnosis:   Diagnosis ICD-10-CM Associated Orders   1  Pressure injury of contiguous region involving back and left buttock, stage 4 ([facility])  L89 44 lidocaine (XYLOCAINE) 4 % topical solution 5 mL     Wound cleansing and dressings   2  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 ([facility])  L89 223 lidocaine (XYLOCAINE) 4 % topical solution 5 mL     Wound cleansing and dressings     Debridement        Assessment :  Stage III pressure ulcer of the left hip  Slightly improved  Stage IV pressure ulcer of the left buttock     Plan:  Selective debridement of the left hip ulcer  Will use bordered foam   See orders  Continued offload  The patient reportedly has an appointment with plastic surgery  Subjective:   8/26/20: Patient returns to the wound center for further evaluation of his stage IV pressure ulcer of the sacrum and stage III pressure ulcer of his hip  He is palliative care  Patient is paraplegic  Current treatment includes saline dressings  Dermagran to the hip ulcer  He has all the offloading devices  He denies any fever, chills or cough  Nothing else is change for him  His pain is unchanged  Is neuropathic     10/1/20: Followup stage IV pressure ulcer of the sacrum and stage III of the left hip  Nothing much is changed  He has no fever, pain, chills  Continues with saline packing to the stage IV and Dermagran to the stage III  No cough  11/5/20: Followup stage IV pressure ulcer of the sacrum and stage III pressure ulcer of the left hip  Stage IV pressure ulcer of the left buttock also  Essentially, nothing is changed    He still has not made an appointment with plastic surgery and wants to wait tail after the 1st of the year  Current wound care includes saline gauze packing and Dermagran to the hip  He denies any fever, chills or cough  1/21/21: Followup stage IV pressure ulcer of the sacrum/left buttock  Also stage III of the left hip  Patient states that he gave up his air mattress and now is in a regular bed  He feels that he can offload just as well  Did not want to use the air mattress any longer  He states that he needs a referral to plastic surgery  Saline packing is being used to the large ulcer and Dermagran to the left hip  He denies any fever, chills or cough  3/3/21: Followup stage IV pressure ulcer of the left buttock and stage III of the left hip  No changes  He has appointment plastic surgery next week  No longer has any specialty mattress as per his choice  He states nothing else is new  Denies any fever, chills or cough            The following portions of the patient's history were reviewed and updated as appropriate:   Patient Active Problem List   Diagnosis    Stage IV pressure ulcer of sacral region ([address] )    Stage IV pressure ulcer of left heel (HCC)    Cyst of joint of shoulder    Anemia    Paraplegic spinal paralysis ([address] )    Sinus tachycardia    GERD (gastroesophageal reflux disease)    History of osteomyelitis    Anxiety    Amputated right leg (HCC)    Benign essential hypertension    Diabetes mellitus type II, controlled ([address] )    Diarrhea    Decubitus ulcer of ischium, left, stage IV (HCC)    Chronic, continuous use of opioids    Colostomy in place [facility])    Colon cancer screening    Dyspepsia    Epigastric pain    Osteomyelitis of pelvic region ([address] )    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[address] )    Neurogenic bladder    Sepsis with hypotension ([address] )    History of Clostridium difficile colitis    Hyperkalemia    Stage II pressure ulcer of buttock ([address] )    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, stage 3 ([address] )    SBO (small bowel obstruction) ([address] )    Sepsis ([facility]    Hypokalemia    Renal cyst    Other male erectile dysfunction    Prostate cancer screening     Past Medical History:   Diagnosis Date    Anemia     Blind     r eye    Chronic cystitis     Colostomy in place [facility])     Detrusor sphincter dyssynergia     Diabetes mellitus ([facility] )     Poorly controlled type 2; Last Assessed:  3/18/14    Erectile dysfunction     Frequency of urination     GERD (gastroesophageal reflux disease)     History of diabetes mellitus     History of osteomyelitis     Hx of leg amputation (HCC)     r high upper leg    Hyperlipidemia     Hypertension     Hypospadias     Incomplete bladder emptying     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([facility] ) 9/16/2019    Neurogenic bladder     Paralysis ([facility] )     [facility]legia ([facility])     Spinal cord cysts     Ulcer of sacral region ([facility] )     Urge incontinence      Past Surgical History:   Procedure Laterality Date    AMPUTATION      At hip; Last Assessed:  1/19/16    BLADDER SURGERY      COLON SURGERY      llq ostomy pouch    COLOSTOMY      COMPLEX CYSTOMETROGRAM  2014    CT CYSTOGRAM  9/19/2019    CYSTOSCOPY  2014    IR PICC LINE REPOSITION  9/23/2019    IR SUPRAPUBIC CATHETER PLACEMENT  9/19/2019    LEG AMPUTATION      MEATOTOMY      PENILE PROSTHESIS  REMOVAL N/A 11/1/2019    Procedure: REMOVAL PROSTHESIS PENILE;  Surgeon: Sue Peguero MD;  Location: [facility];  Service: Urology    PENILE PROSTHESIS IMPLANT  2011    NM ADJ TISS XFER SCALP,EXTREM 10 1-30 SQCM Left 5/1/2017    Procedure: POSTERIOR THIGH V-Y ADMANCEMENT;  Surgeon: Pretty Maciel MD;  [facility];  Service: Plastics    NM MUSCLE-SKIN FLAP,TRUNK Left 5/1/2017    Procedure: FLAP CLOSURE LEFT ISCHIAL WOUND and "RIGHT" ISCHIAL FLAP ADVANCEMENT * DEBRIDEMENT, [place] ;  Surgeon: Pretty Maciel MD;  [facility];  Service: Plastics    NM MUSCLE-SKIN FLAP,TRUNK Left 9/27/2017    Procedure: gluteal myocutaneous rotational flap, posterior thigh v to y advancement- wound 5 x 2 5 x 8;  Surgeon: Rosina Espinosa MD;  [facility];  Service: Plastics    SPINE SURGERY      Lower back    UROFLOWMETRY SIMPLE / COMPLEX       Family History   Problem Relation Age of Onset    No Known Problems Mother     No Known Problems Father      Social History     Socioeconomic History    Marital status: /Civil Union     Spouse name: None    Number of children: None    Years of education: None    Highest education level: None   Occupational History    None   Social Needs    Financial resource strain: None    Food insecurity     Worry: None     Inability: None    Transportation needs     Medical: None     Non-medical: None   Tobacco Use    Smoking status: Former Smoker     Packs/day: 0 50     Years: 10 00     Pack years: 5 00     Quit date:      Years since quittin 1    Smokeless tobacco: Never Used    Tobacco comment: Onset date:  11/10/17   Substance and Sexual Activity    Alcohol use: Yes     Frequency: 2-4 times a month     Comment: Per [employer]:  Social drinker (Onset date:  11/10/17)    Drug use: No    Sexual activity: None   Lifestyle    Physical activity     Days per week: None     Minutes per session: None    Stress: None   Relationships    Social connections     Talks on phone: None     Gets together: None     Attends Hindu service: None     Active member of club or organization: None     Attends meetings of clubs or organizations: None     Relationship status: None    Intimate partner violence     Fear of current or ex partner: None     Emotionally abused: None     Physically abused: None     Forced sexual activity: None   Other Topics Concern    None   Social History Narrative    Native language Nepali    [employer]    Social history reviewed, unchanged       Current Outpatient Medications:     ACCU-CHEK FASTCLIX LANCETS MISC, USE TWICE DAILY, Disp: 102 each, Rfl: 0    Accu-Chek FastClix Lancets MISC, USE AS DIRECTED, Disp: 306 each, Rfl: 2    ACCU-CHEK SMARTVIEW test strip, TEST TWICE DAILY, Disp: 100 each, Rfl: 0    acetaminophen (TYLENOL) 325 mg tablet, Take 2 tablets (650 mg total) by mouth every 6 (six) hours as needed for mild pain, headaches or fever, Disp: 30 tablet, Rfl: 0    ASPIRIN LOW DOSE 81 MG EC tablet, TAKE 1 TABLET BY MOUTH EVERY DAY (Patient not taking: Reported on 7/22/2020), Disp: 30 tablet, Rfl: 0    Blood Glucose Monitoring Suppl (ACCU-CHEK OSEI SMARTVIEW) w/Device KIT, by Does not apply route daily, Disp: 1 kit, Rfl: 0    Blood Glucose Monitoring Suppl (ONE TOUCH ULTRA 2) w/Device KIT, by Does not apply route daily, Disp: 100 each, Rfl: 3    Disposable Gloves (LATEX GLOVES LARGE) MISC, Use as needed up to 3 times a day dispo 2 boxes, Disp: 2 each, Rfl: 11    glucose blood (OneTouch Ultra) test strip, Use 1 each daily Test daily, Disp: 100 each, Rfl: 0    ibuprofen (MOTRIN) 800 mg tablet, Take 1 tablet (800 mg total) by mouth every 8 (eight) hours as needed for mild pain, Disp: 60 tablet, Rfl: 0    Incontinence Supply Disposable (SUNBEAM INCONTINENT UNDER PAD) MISC, Use 1 per week, dispense 4 reusable underpads, Disp: 4 each, Rfl: 11    Incontinence Supply Disposable MISC, by Does not apply route 2 (two) times a day, Disp: 60 each, Rfl: 11    naloxone (NARCAN) 4 mg/0 1 mL nasal spray, Administer 1 spray into a nostril  If no response after 2-3 minutes, give another dose in the other nostril using a new spray   (Patient not taking: Reported on 2/23/2021), Disp: 1 each, Rfl: 1    Nutritional Supplements (GLUCERNA SHAKE) LIQD, Take 3 Cans by mouth 3 (three) times a day, Disp: 90 Can, Rfl: 11    omeprazole (PriLOSEC) 20 mg delayed release capsule, Take 1 capsule (20 mg total) by mouth daily (Patient not taking: Reported on 2/26/2020), Disp: 90 capsule, Rfl: 0    omeprazole (PriLOSEC) 40 MG capsule, Take 1 capsule (40 mg total) by mouth daily, Disp: 90 capsule, Rfl: 0    OneTouch Ulices Bi Lancets 33G MISC, Use daily, Disp: 1 each, Rfl: 0    oxyCODONE (ROXICODONE) 20 MG TABS, Take 1 tablet (20 mg total) by mouth 3 (three) times a day as needed for moderate painMax Daily Amount: 60 mg, Disp: 90 tablet, Rfl: 0    SODIUM HYPOCHLORITE 0 25 percent, USE UTD, Disp: , Rfl:   No current facility-administered medications for this visit  Review of Systems   Constitutional: Negative for activity change, appetite change, chills, fatigue and fever  HENT: Negative for congestion, hearing loss and postnasal drip  Eyes: Negative for visual disturbance  Respiratory: Negative for cough and shortness of breath  Cardiovascular: Negative for chest pain and leg swelling  Gastrointestinal: Negative for abdominal pain, constipation, diarrhea and nausea  Endocrine: Negative  Genitourinary: Negative for dysuria and urgency  Musculoskeletal: Positive for gait problem (Nonambulatory)  Skin: Positive for wound (Left hip and sacrum)  Negative for rash  Neurological: Positive for weakness (Paraplegic)  Paraplegia and neuropathy   Hematological: Does not bruise/bleed easily  Psychiatric/Behavioral: Negative  Objective:  /90   Pulse 60   Temp 97 5 °F (36 4 °C)   Resp 18   Pain Score: 0-No pain     Physical Exam  Vitals signs and nursing note reviewed  Constitutional:       Appearance: Normal appearance  He is well-developed and normal weight  HENT:      Head: Normocephalic and atraumatic  Skin:     General: Skin is warm and dry  Findings: Wound present  Comments: Left buttock ulcer has clean, granulation tissue  No significant change  The left hip ulcer is slightly improved with callous buildup at the edges  Base is mostly granular with some fibrin  Neurological:      Mental Status: He is alert and oriented to person, place, and time     Psychiatric:         Attention and Perception: Attention normal          Mood and Affect: Mood and affect normal  Behavior: Behavior is cooperative  Cognition and Memory: Cognition normal          Wound 08/26/20 Buttocks Left (Active)   Wound Image   03/03/21 1021   Wound Description Epithelialization;Granulation tissue;Probes to bone 03/03/21 1034   Pressure Injury Stage 4 01/21/21 1050   [place]-wound Assessment Scar Tissue; Intact 03/03/21 1034   Wound Length (cm) 4 8 cm 03/03/21 1034   Wound Width (cm) 1 5 cm 03/03/21 1034   Wound Depth (cm) 2 7 cm 03/03/21 1034   Wound Surface Area (cm^2) 7 2 cm^2 03/03/21 1034   Wound Volume (cm^3) 19 44 cm^3 03/03/21 1034   Calculated Wound Volume (cm^3) 19 44 cm^3 03/03/21 1034   Change in Wound Size % 32 5 03/03/21 1034   Tunneling 4 5 cm 08/26/20 1103   Tunneling in depth located at 3 5cm 5 OCLOCK 01/21/21 1050   Undermining 6 3 03/03/21 1034   Undermining is depth extending from 9-12- 6 3 cm and 3-6= 6 3 @ 4 03/03/21 1034   Drainage Amount Moderate 03/03/21 1034   Drainage Description Serosanguineous 03/03/21 1034   Non-staged Wound Description Full thickness 03/03/21 1034   Treatments Cleansed 01/21/21 1050   Wound packed? Yes 01/21/21 1050   Packing- # removed 1 11/05/20 1036   Dressing Changed Changed 03/03/21 1034   Patient Tolerance Tolerated well 01/21/21 1050   Dressing Status Removed 01/21/21 1050       Wound 08/26/20 Hip Left (Active)   Wound Image   03/03/21 1026   Wound Description Granulation tissue; Epithelialization 03/03/21 1035   Pressure Injury Stage 3 01/21/21 1052   S[place]by-wound Assessment Scar Tissue; Intact 03/03/21 1035   Wound Length (cm) 0 5 cm 03/03/21 1035   Wound Width (cm) 2 3 cm 03/03/21 1035   Wound Depth (cm) 0 3 cm 03/03/21 1035   Wound Surface Area (cm^2) 1 15 cm^2 03/03/21 1035   Wound Volume (cm^3) 0 35 cm^3 03/03/21 1035   Calculated Wound Volume (cm^3) 0 35 cm^3 03/03/21 1035   Change in Wound Size % -52 17 03/03/21 1035   Drainage Amount Small 03/03/21 1035   Drainage Description Sanguineous 03/03/21 1035   Non-staged Wound Description Full thickness 03/03/21 1035   Treatments Cleansed 01/21/21 1052   Dressing Changed Changed 03/03/21 1035   Patient Tolerance Tolerated well 01/21/21 1052   Dressing Status Removed 01/21/21 1052          Debridement   Wound 08/26/20 Hip Left    Universal Protocol:  Consent: Verbal consent obtained  Written consent obtained  Consent given by: patient  Time out: Immediately prior to procedure a "time out" was called to verify the correct patient, procedure, equipment, support staff and site/side marked as required  Patient identity confirmed: verbally with patient      Performed by: physician  Debridement type: selective  Pain control: lidocaine 4%  Post-debridement measurements  Length (cm): 0 6  Width (cm): 2 5  Depth (cm): 0 3  Percent debrided: 100%  Surface Area (cm^2): 1 5  Area debrided (cm^2): 1 5  Volume (cm^3): 0 45  Devitalized tissue debrided: callus and fibrin  Instrument(s) utilized: curette  Bleeding: small  Hemostasis obtained with: pressure  Procedural pain (0-10): insensate  Post-procedural pain: insensate   Response to treatment: procedure was tolerated well                   Wound Instructions:  Orders Placed This Encounter   Procedures    Wound cleansing and dressings     Wound cleansing and dressings      Left buttock  Cleanse/Irrigate wound with Normal Saline  - Please ensure the tunnel at 7-12 oclock and at 4-5 oclock are irrigated well with NSS  Cleanse gaby-wound skin with pH balanced soap and water  Apply primary dressing:  Loosely pack with saline moistened gauze  IF wound starts to look worse resume dakins packing     Apply secondary dressing: - 4x4s then ABD pad  Secure with: - medfix tape  Change dressing every day and as needed if soiled        Wound Left Hip   Cleanse/Irrigate wound with Normal Saline  Do not get dressing wet    Apply primary dressing: -bordered foam  Cover with ABD from above dressing  Secure with: - medfix tape  Change dressing 3 x per week and as needed if soiled       Skin tears due to tape:  Cleanse with normal saline  Skin prep to periwound skin  Tegaderm to cover open areas  Leave in place until Tegaderm falls off on its own and then replace if area still open    [id number] [facility],[address] home care services/skilled nursing - 3 x per week (family to do in between)       Additional Orders:     OFF-LOADING  Avoid pressure at wound site  - all wounds, including healed L medial knee  Wheelchair Cushion  - ROHO cushion  Do Not Sit for Long Periods of Time  - 1 hr max for meals only, otherwise in bed and turn side to side at least every 3 hours or more frequently  Reposition in regular bed for now at least every 1-2 hours while awake       FOLLOW-UP APPOINTMENTS  Return Appointment in: - 4 weeks      MISC/ADDITIONAL ORDERS  Continue all home medications unless otherwise instructed  Increase dietary Protein intake - Increase your protein with food such as meats, fish, chicken, beef, beans, cottage cheese, nuts, eggs, cheese, peanut butter, yogurt  Continue Ryan     Today's wpund treatment note:  Wounds cleansed with normal saline and redressed as ordered above     Standing Status:   Future     Standing Expiration Date:   3/3/2022    Debridement     This order was created via procedure documentation       Iva Rodriguez MD, CHT, CWS       Portions of the record may have been created with voice recognition software  Occasional wrong word or "sound alike" substitutions may have occurred due to the inherent limitations of voice recognition software  Read the chart carefully and recognize, using context, where substitutions have occurred

## 2021-03-03 NOTE — PATIENT INSTRUCTIONS
Orders Placed This Encounter   Procedures    Wound cleansing and dressings     Wound cleansing and dressings      Left buttock  Cleanse/Irrigate wound with Normal Saline  - Please ensure the tunnel at 7-12 oclock and at 4-5 oclock are irrigated well with NSS  Cleanse gaby-wound skin with pH balanced soap and water  Apply primary dressing:  Loosely pack with saline moistened gauze  IF wound starts to look worse resume dakins packing     Apply secondary dressing: - 4x4s then ABD pad  Secure with: - medfix tape  Change dressing every day and as needed if soiled        Wound Left Hip   Cleanse/Irrigate wound with Normal Saline  Do not get dressing wet  Apply primary dressing: -bordered foam  Cover with ABD from above dressing  Secure with: - medfix tape  Change dressing 3 x per week and as needed if soiled       Skin tears due to tape:  Cleanse with normal saline  Skin prep to periwound skin  Tegaderm to cover open areas  Leave in place until Tegaderm falls off on its own and then replace if area still open    [address] [facility],[address] home care services/skilled nursing - 3 x per week (family to do in between)       Additional Orders:     OFF-LOADING  Avoid pressure at wound site  - all wounds, including healed L medial knee  Wheelchair Cushion  - ROHO cushion  Do Not Sit for Long Periods of Time  - 1 hr max for meals only, otherwise in bed and turn side to side at least every 3 hours or more frequently  Reposition in regular bed for now at least every 1-2 hours while awake       FOLLOW-UP APPOINTMENTS  Return Appointment in: - 4 weeks      MISC/ADDITIONAL ORDERS  Continue all home medications unless otherwise instructed  Increase dietary Protein intake - Increase your protein with food such as meats, fish, chicken, beef, beans, cottage cheese, nuts, eggs, cheese, peanut butter, yogurt    Continue Ryan     Today's wpund treatment note:  Wounds cleansed with normal saline and redressed as ordered above     Standing Status:   Future     Standing Expiration Date:   3/3/2022

## 2021-03-08 ENCOUNTER — TELEPHONE (OUTPATIENT)
Dept: FAMILY MEDICINE CLINIC | Facility: CLINIC | Age: 61
End: 2021-03-08

## 2021-03-08 NOTE — TELEPHONE ENCOUNTER
SIGNATURES NEEDED FOR [employer]  FORM RECEIVED VIA FAX  WILL BE PLACED IN YOUR BIN AT ASSIGNED DELIVERY TIMES      Coy Brian

## 2021-03-10 DIAGNOSIS — Z23 ENCOUNTER FOR IMMUNIZATION: ICD-10-CM

## 2021-03-11 ENCOUNTER — TELEPHONE (OUTPATIENT)
Dept: FAMILY MEDICINE CLINIC | Facility: CLINIC | Age: 61
End: 2021-03-11

## 2021-03-11 NOTE — TELEPHONE ENCOUNTER
SIGNATURES NEEDED FOR [employer] ( large underpads, small underpads, gloves, protective underpad) FORM RECEIVED VIA FAX  WILL BE PLACED IN YOUR BIN AT ASSIGNED DELIVERY TIMES         [employer] refax signed script

## 2021-04-02 DIAGNOSIS — E11.9 TYPE 2 DIABETES MELLITUS WITHOUT COMPLICATION, WITHOUT LONG-TERM CURRENT USE OF INSULIN (HCC): ICD-10-CM

## 2021-04-02 RX ORDER — LANCETS 33 GAUGE
EACH MISCELLANEOUS DAILY
Qty: 1 EACH | Refills: 0 | Status: SHIPPED | OUTPATIENT
Start: 2021-04-02 | End: 2021-05-13 | Stop reason: ALTCHOICE

## 2021-04-05 DIAGNOSIS — L89.324 STAGE IV PRESSURE ULCER OF LEFT BUTTOCK (HCC): ICD-10-CM

## 2021-04-05 DIAGNOSIS — G89.29 CHRONIC LOW BACK PAIN WITHOUT SCIATICA, UNSPECIFIED BACK PAIN LATERALITY: ICD-10-CM

## 2021-04-05 DIAGNOSIS — M54.50 CHRONIC LOW BACK PAIN WITHOUT SCIATICA, UNSPECIFIED BACK PAIN LATERALITY: ICD-10-CM

## 2021-04-05 RX ORDER — OXYCODONE HYDROCHLORIDE 20 MG/1
20 TABLET ORAL 3 TIMES DAILY PRN
Qty: 90 TABLET | Refills: 0 | Status: SHIPPED | OUTPATIENT
Start: 2021-04-05 | End: 2021-05-03 | Stop reason: SDUPTHER

## 2021-04-05 NOTE — TELEPHONE ENCOUNTER
Patient is calling to get his oxyCODONE (ROXICODONE) 20 MG TABS  Refilled   He said he is completely out  States he called Thursday to get it refilled but I don't see anything for this medication      Thank you,

## 2021-04-07 ENCOUNTER — TELEPHONE (OUTPATIENT)
Dept: FAMILY MEDICINE CLINIC | Facility: CLINIC | Age: 61
End: 2021-04-07

## 2021-04-07 NOTE — TELEPHONE ENCOUNTER
SIGNATURES NEEDED FOR [employer] FORM RECEIVED VIA FAX  WILL BE PLACED IN YOUR BIN AT ASSIGNED DELIVERY TIMES      [de-identified]    Large underpads  Small underpads  Gloves  Protective underpads

## 2021-04-08 ENCOUNTER — OFFICE VISIT (OUTPATIENT)
Dept: WOUND CARE | Facility: HOSPITAL | Age: 61
End: 2021-04-08
Payer: MEDICARE

## 2021-04-08 VITALS
SYSTOLIC BLOOD PRESSURE: 162 MMHG | RESPIRATION RATE: 18 BRPM | TEMPERATURE: 97.6 F | DIASTOLIC BLOOD PRESSURE: 86 MMHG | HEART RATE: 66 BPM

## 2021-04-08 DIAGNOSIS — L89.44 PRESSURE INJURY OF CONTIGUOUS REGION INVOLVING BACK AND LEFT BUTTOCK, STAGE 4 (HCC): Primary | ICD-10-CM

## 2021-04-08 DIAGNOSIS — L89.223 STAGE III PRESSURE ULCER OF LEFT HIP (HCC): ICD-10-CM

## 2021-04-08 DIAGNOSIS — G82.20 PARAPLEGIA (HCC): ICD-10-CM

## 2021-04-08 PROCEDURE — 11042 DBRDMT SUBQ TIS 1ST 20SQCM/<: CPT | Performed by: FAMILY MEDICINE

## 2021-04-08 PROCEDURE — 99213 OFFICE O/P EST LOW 20 MIN: CPT | Performed by: FAMILY MEDICINE

## 2021-04-08 NOTE — PATIENT INSTRUCTIONS
Orders Placed This Encounter   Procedures    Wound cleansing and dressings     Wound cleansing and dressings       Wound cleansing and dressings             Left buttock  Cleanse/Irrigate wound with Normal Saline  - Please ensure the tunnel at 7-12 oclock and at 4-5 oclock are irrigated well with NSS  Cleanse gaby-wound skin with pH balanced soap and water  Apply primary dressing:  Loosely pack with saline moistened gauze  IF wound starts to look worse resume dakins packing     Apply secondary dressing: - 4x4s then ABD pad  Secure with: - medfix tape  Change dressing every day and as needed if soiled        Wound Left Hip   Cleanse/Irrigate wound with Normal Saline  Do not get dressing wet  Apply primary dressing: -bordered foam  Cover with ABD from above dressing  Secure with: - medfix tape  Change dressing 3 x per week and as needed if soiled       Skin tears due to tape:  Cleanse with normal saline  Skin prep to periwound skin  Tegaderm to cover open areas  Leave in place until Tegaderm falls off on its own and then replace if area still open  [address] home care services/skilled nursing - 3 x per week (family to do in between)        Additional Orders:     OFF-LOADING  Avoid pressure at wound site  - all wounds, including healed L medial knee  Wheelchair Cushion  - ROHO cushion  Do Not Sit for Long Periods of Time  - 1 hr max for meals only, otherwise in bed and turn side to side at least every 3 hours or more frequently  Reposition in regular bed for now at least every 1-2 hours while awake       FOLLOW-UP APPOINTMENTS  Return Appointment in: - with plastics on May 10th at the [facility] in [place] pending results of plastics appointment      MISC/ADDITIONAL ORDERS  Continue all home medications unless otherwise instructed    Increase dietary Protein intake - Increase your protein with food such as meats, fish, chicken, beef, beans, cottage cheese, nuts, eggs, cheese, peanut butter, yogurt    Continue Ryan      Today's wpund treatment note:  Wounds cleansed with normal saline and redressed as ordered above     Standing Status:   Future     Standing Expiration Date:   4/8/2022

## 2021-04-08 NOTE — PROGRESS NOTES
Patient ID: Ana Paula Coronado is a 62 y o  male Date of Birth 1960       Chief Complaint   Patient presents with    Follow Up Wound Care Visit     multiple pressure injuries       Allergies:  Patient has no known allergies  Diagnosis:   Diagnosis ICD-10-CM Associated Orders   1  Pressure injury of contiguous region involving back and left buttock, stage 4 ([facility])  L89 44 Wound cleansing and dressings   2  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 ([facility])  U71 824 Wound cleansing and dressings     Debridement   3  Paraplegia ([facility])  G82 20         Assessment :  Stage IV pressure ulcer of the left buttock and stage III of the left hip  No significant change in the left buttock  The ulcer is clean and granular with tunnel that does not probe to bone  Significant undermining persists  Plan:  Surgical debridement of the left hip ulcer  Continue with saline packing wet to dry on the buttock ulcer  Patient has appointment in May with plastic surgery and depending on what happens with this, whether he will return or not  Subjective:   8/26/20: Patient returns to the wound center for further evaluation of his stage IV pressure ulcer of the sacrum and stage III pressure ulcer of his hip  He is palliative care  Patient is paraplegic  Current treatment includes saline dressings  Dermagran to the hip ulcer  He has all the offloading devices  He denies any fever, chills or cough  Nothing else is change for him  His pain is unchanged  Is neuropathic     10/1/20: Followup stage IV pressure ulcer of the sacrum and stage III of the left hip  Nothing much is changed  He has no fever, pain, chills  Continues with saline packing to the stage IV and Dermagran to the stage III  No cough  11/5/20: Followup stage IV pressure ulcer of the sacrum and stage III pressure ulcer of the left hip  Stage IV pressure ulcer of the left buttock also  Essentially, nothing is changed    He still has not made an appointment with plastic surgery and wants to wait tail after the 1st of the year  Current wound care includes saline gauze packing and Dermagran to the hip  He denies any fever, chills or cough  1/21/21: Followup stage IV pressure ulcer of the sacrum/left buttock  Also stage III of the left hip  Patient states that he gave up his air mattress and now is in a regular bed  He feels that he can offload just as well  Did not want to use the air mattress any longer  He states that he needs a referral to plastic surgery  Saline packing is being used to the large ulcer and Dermagran to the left hip  He denies any fever, chills or cough  3/3/21: Followup stage IV pressure ulcer of the left buttock and stage III of the left hip  No changes  He has appointment plastic surgery next week  No longer has any specialty mattress as per his choice  He states nothing else is new  Denies any fever, chills or cough  4/8/21:  Followup stage IV pressure ulcer of the left buttock and stage III of the left hip  He offers no complaints and denies any pain  He has an appointment with plastic surgery next month  For some reason did not make his appointment last month  Denies any fever, chills or cough            The following portions of the patient's history were reviewed and updated as appropriate:   Patient Active Problem List   Diagnosis    Stage IV pressure ulcer of sacral region ([address] )    Stage IV pressure ulcer of left heel ([address] )    Cyst of joint of shoulder    Anemia    Paraplegic spinal paralysis ([address] )    Sinus tachycardia    GERD (gastroesophageal reflux disease)    History of osteomyelitis    Anxiety    Amputated right leg (HCC)    Benign essential hypertension    Diabetes mellitus type II, controlled ([address] )    Diarrhea    Decubitus ulcer of ischium, left, stage IV (HCC)    Chronic, continuous use of opioids    Colostomy in place 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[facility]    Neurogenic bladder    Sepsis with hypotension ([facility] )    History of Clostridium difficile colitis    Hyperkalemia    Stage II pressure ulcer of buttock ([facility])    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, stage 3 ([facility])    SBO (small bowel obstruction) ([facility] )    Sepsis ([facility] )    Hypokalemia    Renal cyst    Other male erectile dysfunction    Prostate cancer screening     Past Medical History:   Diagnosis Date    Anemia     Blind     r eye    Chronic cystitis     Colostomy in place [facility])     Detrusor sphincter dyssynergia     Diabetes mellitus ([facility] )     Poorly controlled type 2; Last Assessed:  3/18/14    Erectile dysfunction     Frequency of urination     GERD (gastroesophageal reflux disease)     History of diabetes mellitus     History of osteomyelitis     Hx of leg amputation ([facility])     r high upper leg    Hyperlipidemia     Hypertension     Hypospadias     Incomplete bladder emptying     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([facility] ) 9/16/2019    Neurogenic bladder     Paralysis ([facility] )     Paraplegia ([facility])     Spinal cord cysts     Ulcer of sacral region ([facility] )     Urge incontinence      Past Surgical History:   Procedure Laterality Date    AMPUTATION      At hip; Last Assessed:  1/19/16    BLADDER SURGERY      COLON SURGERY      llq ostomy pouch    COLOSTOMY      COMPLEX CYSTOMETROGRAM  2014    CT CYSTOGRAM  9/19/2019    CYSTOSCOPY  2014    IR PICC REPOSITION  9/23/2019    IR SUPRAPUBIC CATHETER PLACEMENT  9/19/2019    LEG AMPUTATION      MEATOTOMY      PENILE PROSTHESIS  REMOVAL N/A 11/1/2019    Procedure: REMOVAL PROSTHESIS PENILE;  Surgeon: Kevon Burciaga MD;  [facility]: [facility];  Service: Urology    PENILE PROSTHESIS IMPLANT  2011    IL ADJ TISS XFER SCALP,EXTREM 10 1-30 SQCM Left 5/1/2017    Procedure: POSTERIOR THIGH V-Y ADMANCEMENT;  Surgeon: Danni Campbell MD;  [facility];  Service: Plastics  NC MUSCLE-SKIN FLAP,TRUNK Left 2017    Procedure: FLAP CLOSURE LEFT ISCHIAL WOUND and "RIGHT" ISCHIAL FLAP ADVANCEMENT * DEBRIDEMENT, VAC PLACEMENT ;  Surgeon: Gloria Jovel MD;  [facility];  Service: Plastics    NC MUSCLE-SKIN FLAP,TRUNK Left 2017    Procedure: gluteal myocutaneous rotational flap, posterior thigh v to y advancement- wound 5 x 2 5 x 8;  Surgeon: Gloria Jovel MD;  [facility];  Service: Plastics    SPINE SURGERY      Lower back    UROFLOWMETRY SIMPLE / COMPLEX       Family History   Problem Relation Age of Onset    No Known Problems Mother     No Known Problems Father      Social History     Socioeconomic History    Marital status: /Civil Union     Spouse name: Not on file    Number of children: Not on file    Years of education: Not on file    Highest education level: Not on file   Occupational History    Not on file   Social Needs    Financial resource strain: Not on file    Food insecurity     Worry: Not on file     Inability: Not on file   [employer] needs     Medical: Not on file     Non-medical: Not on file   Tobacco Use    Smoking status: Former Smoker     Packs/day: 0 50     Years: 10 00     Pack years: 5 00     Quit date:      Years since quittin 2    Smokeless tobacco: Never Used    Tobacco comment: Onset date:  11/10/17   Substance and Sexual Activity    Alcohol use: Yes     Frequency: 2-4 times a month     Comment: Per [employer]:  Social drinker (Onset date:  11/10/17)   Rosalina Thorpe Drug use: No    Sexual activity: Not on file   Lifestyle    Physical activity     Days per week: Not on file     Minutes per session: Not on file    Stress: Not on file   Relationships    Social connections     Talks on phone: Not on file     Gets together: Not on file     Attends Synagogue service: Not on file     Active member of club or organization: Not on file     Attends meetings of clubs or organizations: Not on file     Relationship status: Not on file    Intimate partner violence     Fear of current or ex partner: Not on file     Emotionally abused: Not on file     Physically abused: Not on file     Forced sexual activity: Not on file   Other Topics Concern    Not on file   Social History Narrative    Native language [place]    [employer]    Social history reviewed, unchanged       Current Outpatient Medications:     ACCU-CHEK FASTCLIX LANCETS MISC, USE TWICE DAILY, Disp: 102 each, Rfl: 0    Accu-Chek FastClix Lancets MISC, USE AS DIRECTED, Disp: 306 each, Rfl: 2    ACCU-CHEK SMARTVIEW test strip, TEST TWICE DAILY, Disp: 100 each, Rfl: 0    acetaminophen (TYLENOL) 325 mg tablet, Take 2 tablets (650 mg total) by mouth every 6 (six) hours as needed for mild pain, headaches or fever, Disp: 30 tablet, Rfl: 0    ASPIRIN LOW DOSE 81 MG EC tablet, TAKE 1 TABLET BY MOUTH EVERY DAY (Patient not taking: Reported on 7/22/2020), Disp: 30 tablet, Rfl: 0    Blood Glucose Monitoring Suppl (ACCU-CHEK OSEI SMARTVIEW) w/Device KIT, by Does not apply route daily, Disp: 1 kit, Rfl: 0    Blood Glucose Monitoring Suppl (ONE TOUCH ULTRA 2) w/Device KIT, by Does not apply route daily, Disp: 100 each, Rfl: 3    Disposable Gloves (LATEX GLOVES LARGE) MISC, Use as needed up to 3 times a day dispo 2 boxes, Disp: 2 each, Rfl: 11    glucose blood (OneTouch Ultra) test strip, Use 1 each daily Test daily, Disp: 100 each, Rfl: 0    ibuprofen (MOTRIN) 800 mg tablet, Take 1 tablet (800 mg total) by mouth every 8 (eight) hours as needed for mild pain, Disp: 60 tablet, Rfl: 0    Incontinence Supply Disposable (SUNBEAM INCONTINENT UNDER PAD) MISC, Use 1 per week, dispense 4 reusable underpads, Disp: 4 each, Rfl: 11    Incontinence Supply Disposable MISC, by Does not apply route 2 (two) times a day, Disp: 60 each, Rfl: 11    naloxone (NARCAN) 4 mg/0 1 mL nasal spray, Administer 1 spray into a nostril   If no response after 2-3 minutes, give another dose in the other nostril using a new spray  (Patient not taking: Reported on 2/23/2021), Disp: 1 each, Rfl: 1    Nutritional Supplements (GLUCERNA SHAKE) LIQD, Take 3 Cans by mouth 3 (three) times a day, Disp: 90 Can, Rfl: 11    omeprazole (PriLOSEC) 20 mg delayed release capsule, Take 1 capsule (20 mg total) by mouth daily (Patient not taking: Reported on 2/26/2020), Disp: 90 capsule, Rfl: 0    omeprazole (PriLOSEC) 40 MG capsule, Take 1 capsule (40 mg total) by mouth daily, Disp: 90 capsule, Rfl: 0    OneTouch Delica Lancets 76Y MISC, Use daily, Disp: 1 each, Rfl: 0    oxyCODONE (ROXICODONE) 20 MG TABS, Take 1 tablet (20 mg total) by mouth 3 (three) times a day as needed for moderate painMax Daily Amount: 60 mg, Disp: 90 tablet, Rfl: 0    SODIUM HYPOCHLORITE 0 25 percent, USE UTD, Disp: , Rfl:     Review of Systems   Constitutional: Negative for activity change, appetite change, chills, fatigue and fever  HENT: Negative for congestion, hearing loss and postnasal drip  Eyes: Negative for visual disturbance  Respiratory: Negative for cough and shortness of breath  Cardiovascular: Negative for chest pain and leg swelling  Gastrointestinal: Negative for abdominal pain, constipation, diarrhea and nausea  Endocrine: Negative  Genitourinary: Negative for dysuria and urgency  Musculoskeletal: Positive for gait problem (Nonambulatory)  Skin: Positive for wound (Left hip and sacrum)  Negative for rash  Neurological: Positive for weakness (Paraplegic)  Paraplegia and neuropathy   Hematological: Does not bruise/bleed easily  Psychiatric/Behavioral: Negative  Objective:  /86   Pulse 66   Temp 97 6 °F (36 4 °C)   Resp 18   Pain Score:   8     Physical Exam  Vitals signs and nursing note reviewed  Constitutional:       Appearance: Normal appearance  He is well-developed and normal weight  HENT:      Head: Normocephalic and atraumatic  Skin:     General: Skin is warm and dry  Findings: Wound present  Comments: Left buttock ulcer has clean, granulation tissue  No significant change  Tunnel is unchanged no probe to bone  Left hip ulcer with devitalized tissue at the edges and some necrotic tissue in the base  Neurological:      Mental Status: He is alert and oriented to person, place, and time  Psychiatric:         Attention and Perception: Attention normal          Mood and Affect: Mood and affect normal          Behavior: Behavior is cooperative  Cognition and Memory: Cognition normal                      Wound 08/26/20 Hip Left (Active)   Wound Image Images linked 04/08/21 1017       Debridement   Wound 08/26/20 Hip Left    Universal Protocol:  Consent: Verbal consent obtained  Written consent obtained  Consent given by: patient  Time out: Immediately prior to procedure a "time out" was called to verify the correct patient, procedure, equipment, support staff and site/side marked as required    Patient understanding: patient states understanding of the procedure being performed  Patient identity confirmed: verbally with patient      Performed by: physician  Debridement type: surgical  Level of debridement: subcutaneous tissue  Pain control: lidocaine 4%  Post-debridement measurements  Length (cm): 0 7  Width (cm): 1 9  Depth (cm): 0 2  Percent debrided: 100%  Surface Area (cm^2): 1 33  Area debrided (cm^2): 1 33  Volume (cm^3): 0 27  Tissue and other material debrided: dermis, epidermis, subcutaneous tissue and Devitalized epidermis and dermis  Devitalized tissue debrided: fibrin, necrotic debris and Devitalized epidermis and dermis  Instrument(s) utilized: curette  Bleeding: medium  Hemostasis obtained with: pressure  Procedural pain (0-10): 0  Post-procedural pain: 0   Response to treatment: procedure was tolerated well                       Wound Instructions:  Orders Placed This Encounter   Procedures    Wound cleansing and dressings     Wound cleansing and dressings       Wound cleansing and dressings             Left buttock  Cleanse/Irrigate wound with Normal Saline  - Please ensure the tunnel at 7-12 oclock and at 4-5 oclock are irrigated well with NSS  Cleanse gaby-wound skin with pH balanced soap and water  Apply primary dressing:  Loosely pack with saline moistened gauze  IF wound starts to look worse resume dakins packing     Apply secondary dressing: - 4x4s then ABD pad  Secure with: - medfix tape  Change dressing every day and as needed if soiled        Wound Left Hip   Cleanse/Irrigate wound with Normal Saline  Do not get dressing wet  Apply primary dressing: -bordered foam  Cover with ABD from above dressing  Secure with: - medfix tape  Change dressing 3 x per week and as needed if soiled       Skin tears due to tape:  Cleanse with normal saline  Skin prep to periwound skin  Tegaderm to cover open areas  Leave in place until Tegaderm falls off on its own and then replace if area still open  [address] home care services/skilled nursing - 3 x per week (family to do in between)        Additional Orders:     OFF-LOADING  Avoid pressure at wound site  - all wounds, including healed L medial knee  Wheelchair Cushion  - ROHO cushion  Do Not Sit for Long Periods of Time  - 1 hr max for meals only, otherwise in bed and turn side to side at least every 3 hours or more frequently  Reposition in regular bed for now at least every 1-2 hours while awake       FOLLOW-UP APPOINTMENTS  Return Appointment in: - with plastics on May 10th at the [facility] in [place] pending results of plastics appointment      MISC/ADDITIONAL ORDERS  Continue all home medications unless otherwise instructed  Increase dietary Protein intake - Increase your protein with food such as meats, fish, chicken, beef, beans, cottage cheese, nuts, eggs, cheese, peanut butter, yogurt    Continue Ryan      Today's wpund treatment note:  Wounds cleansed with normal saline and redressed as ordered above     Standing Status:   Future     Standing Expiration Date:   4/8/2022    Debridement     This order was created via procedure documentation       Jeffry Argueta MD, CHT, CWS       Portions of the record may have been created with voice recognition software  Occasional wrong word or "sound alike" substitutions may have occurred due to the inherent limitations of voice recognition software  Read the chart carefully and recognize, using context, where substitutions have occurred

## 2021-04-12 NOTE — TELEPHONE ENCOUNTER
[employer] called stating that form is not clear and needs to be resigned, instructions are listed on cover page  SIGNATURES NEEDED FOR [employer] and original RX FORM RECEIVED VIA FAX  WILL BE PLACED IN YOUR BIN AT ASSIGNED DELIVERY TIMES  Patient c/o of dizziness, vomiting today along with right knee pain X 2 weeks. Denies chest pain.

## 2021-05-03 DIAGNOSIS — L89.324 STAGE IV PRESSURE ULCER OF LEFT BUTTOCK (HCC): ICD-10-CM

## 2021-05-03 DIAGNOSIS — G89.29 CHRONIC LOW BACK PAIN WITHOUT SCIATICA, UNSPECIFIED BACK PAIN LATERALITY: ICD-10-CM

## 2021-05-03 DIAGNOSIS — M54.50 CHRONIC LOW BACK PAIN WITHOUT SCIATICA, UNSPECIFIED BACK PAIN LATERALITY: ICD-10-CM

## 2021-05-03 RX ORDER — OXYCODONE HYDROCHLORIDE 20 MG/1
20 TABLET ORAL 3 TIMES DAILY PRN
Qty: 90 TABLET | Refills: 0 | Status: SHIPPED | OUTPATIENT
Start: 2021-05-03 | End: 2021-05-24 | Stop reason: SDUPTHER

## 2021-05-10 ENCOUNTER — OFFICE VISIT (OUTPATIENT)
Dept: WOUND CARE | Facility: HOSPITAL | Age: 61
End: 2021-05-10
Payer: MEDICARE

## 2021-05-10 VITALS
RESPIRATION RATE: 16 BRPM | SYSTOLIC BLOOD PRESSURE: 132 MMHG | HEART RATE: 60 BPM | TEMPERATURE: 96.4 F | DIASTOLIC BLOOD PRESSURE: 74 MMHG

## 2021-05-10 DIAGNOSIS — L89.154 STAGE IV PRESSURE ULCER OF SACRAL REGION (HCC): ICD-10-CM

## 2021-05-10 DIAGNOSIS — L89.223 STAGE III PRESSURE ULCER OF LEFT HIP (HCC): Primary | ICD-10-CM

## 2021-05-10 PROCEDURE — 99214 OFFICE O/P EST MOD 30 MIN: CPT | Performed by: PLASTIC SURGERY

## 2021-05-10 PROCEDURE — 99213 OFFICE O/P EST LOW 20 MIN: CPT | Performed by: PLASTIC SURGERY

## 2021-05-10 NOTE — PATIENT INSTRUCTIONS
Orders Placed This Encounter   Procedures    Wound cleansing and dressings     Left buttock  Cleanse/Irrigate wound with Normal Saline  - Please ensure the tunnel at 11 oclock and Undermining at 3-12 oclock are  irrigated well with NSS  Cleanse gaby-wound skin with pH balanced soap and water  Apply primary dressing: Loosely pack with saline moistened gauze  IF wound starts to look worse resume  dakins packing  Apply secondary dressing: - 4x4s then ABD pad  Secure with: - medfix tape  Change dressing every day and as needed if soiled  Wound Left Hip  Cleanse/Irrigate wound with Normal Saline  Do not get dressing wet  Apply primary dressing: -bordered foam  Cover with ABD from above dressing  Secure with: - medfix tape  Change dressing 3 x per week and as needed if soiled   Skin tears due to tape:  Cleanse with normal saline  Skin prep to periwound skin  Tegaderm to cover open areas  Leave in place until Tegaderm falls off on its own and then replace if area still open    [address] [facility] home care services/skilled nursing - 3 x per week (family to do in between)    OFF-LOADING  Avoid pressure at wound site  - all wounds, including healed L medial knee  Wheelchair Cushion  - ROHO cushion  Do Not Sit for Long Periods of Time  - 1 hr max for meals only, otherwise in bed and turn side to side at least  every 3 hours or more frequently  Reposition in regular bed for now at least every 1-2 hours while awake      Plan for Flap with Dr Kai Fallon in the near future, his office will be in contact with you regarding pre op scheduling/visit    Follow up with Dr Simon Farfan in 4 weeks     Standing Status:   Future     Standing Expiration Date:   5/10/2022

## 2021-05-10 NOTE — PROGRESS NOTES
Wound Care Consult     Visit Date: 5/10/2021      Patient Name: Kristal Bautista  MRN: [id number]           YOB: 1960     Reason for Consult: pressure ulcer        Wound History:    Mr Darlene Dominguez a 63years old male with history of quadriplegic who presents for evaluation of left ischium wound  Patient has had this wound for many years and is recently s/p left posterior thigh V-Y advancement flap and right ischial flap in 5/01/2017 by Dr Rhea Neville and unfortunately sustained a heating pad burn and wound dehiscence that require surgery on 9/27/2017 with gluteal myocutaneous rotational flap, posterior V-Y re-advancement  Therafter patient did well but developed recurrence of wound and is here for evaluation  Patient currently lays on his stomach mostly to avoid pressure to the area  He was ordered a MRI on prior encounter to evaluate for osteomyelitis extend but he is claustrophobic, and was therefore ordered a CT but he has not obtained this    Of notice, patient had prior decubitus ulcers on his right trunk for which underwent multiple operations, including a hip disarticulation, amputation and anterior thigh flap at outside facility       Since our last visit patient manifest is doing well, no recent hospitalization  He is eating more calories and protein and has put on some weight  Otherwise no concerns  He would like to pursue options for wound reconstruction  Pertinent Labs: Albumin   Date Value Ref Range Status   02/11/2021 2 9 (L) 3 5 - 5 0 g/dL Final   02/17/2014 4 0 3 5 - 5 0 g/dL Final       Wound Assessment:  Wound 08/26/20 Buttocks Left (Active)   Wound Image   05/10/21 0919   Wound Description Epithelialization;Granulation tissue;Probes to bone;Slough 05/10/21 0939   Pressure Injury Stage 4 05/10/21 0939   Shelby-wound Assessment Scar Tissue; Intact 05/10/21 0939   Wound Length (cm) 5 cm 05/10/21 0939   Wound Width (cm) 1 5 cm 05/10/21 0939   Wound Depth (cm) 1 6 cm 05/10/21 6741   Wound Surface Area (cm^2) 7 5 cm^2 05/10/21 0939   Wound Volume (cm^3) 12 cm^3 05/10/21 0939   Calculated Wound Volume (cm^3) 12 cm^3 05/10/21 0939   Change in Wound Size % 58 33 05/10/21 0939   Tunneling 5 cm 05/10/21 0939   Tunneling in depth located at 11 o'clock 05/10/21 0939   Undermining 4 5 05/10/21 0939   Undermining is depth extending from 3-12 oclock 05/10/21 0939   Drainage Amount Large 05/10/21 0939   Drainage Description Serosanguineous 05/10/21 0939   Non-staged Wound Description Full thickness 05/10/21 0939   Treatments Cleansed 04/08/21 1000   Wound packed? Yes 01/21/21 1050   Packing- # removed 1 11/05/20 1036   Dressing Changed Changed 04/08/21 1000   Patient Tolerance Tolerated well 04/08/21 1000   Dressing Status Intact 05/10/21 0939       Wound 08/26/20 Hip Left (Active)   Wound Image   05/10/21 0918   Wound Description Granulation tissue;Slough; Epithelialization 05/10/21 0937   Pressure Injury Stage 3 05/10/21 0937   Shelby-wound Assessment Scar Tissue; Intact 05/10/21 0937   Wound Length (cm) 1 1 cm 05/10/21 0937   Wound Width (cm) 2 4 cm 05/10/21 0937   Wound Depth (cm) 0 1 cm 05/10/21 0937   Wound Surface Area (cm^2) 2 64 cm^2 05/10/21 0937   Wound Volume (cm^3) 0 26 cm^3 05/10/21 0937   Calculated Wound Volume (cm^3) 0 26 cm^3 05/10/21 0937   Change in Wound Size % -13 04 05/10/21 0937   Undermining 0 3 05/10/21 0937   Undermining is depth extending from 5-8 05/10/21 0937   Drainage Amount Moderate 05/10/21 0937   Drainage Description Serosanguineous 05/10/21 0937   Non-staged Wound Description Full thickness 05/10/21 0937   Treatments Cleansed 04/08/21 0959   Dressing Changed Changed 04/08/21 0959   Patient Tolerance Tolerated well 01/21/21 1052   Dressing Status Intact 05/10/21 0960       Wound Team Summary Assessment:     Left ischium with significant improvement in wound condition, with contracture, beefy red granulation tissue, no evidence of superficial infection   Healthy surrounding skin  Wound Team Plan:     I discussed that I felt that his wounds have significantly improved since last evaluation likely multifactorial but see like increase in protein intake and good wound care has allow significant contracture  Patient manifest that he would like to explore surgical options since it will improve his quality of like and dependency of daily wound care and multiple office visits  I discussed excision of the ulcer, ostectomy with reconstruction by re-advancing previous posterior thigh flap with the patient  I discussed that this could be performed as inpatient with 7 days hospital stay with 3 months of bedrest on clinitron bed  I discussed risks including but not limited to: bleeding, infection, hematoma, seroma, wound breakdown, scar, need for further surgery, deformity, pain, numbness, weakness, asymmetry, injury to structures, and medical complications  I discussed that the scar involved would be larger than the maximal dimension of the lesion itself  The patient would like to proceed and therefore we will coordinate a surgery date      Vaishali Up MD   [facility]   [address]   [address]   [facility], [address] [facility]   Office: Denys Cox MD  5/10/2021  11:05 AM

## 2021-05-13 DIAGNOSIS — E11.9 TYPE 2 DIABETES MELLITUS WITHOUT COMPLICATION, WITHOUT LONG-TERM CURRENT USE OF INSULIN (HCC): Primary | ICD-10-CM

## 2021-05-13 RX ORDER — BLOOD SUGAR DIAGNOSTIC
1 STRIP MISCELLANEOUS DAILY
Qty: 50 EACH | Refills: 0 | Status: SHIPPED | OUTPATIENT
Start: 2021-05-13 | End: 2021-05-24 | Stop reason: SDUPTHER

## 2021-05-13 NOTE — TELEPHONE ENCOUNTER
PT STATED HAD TO BY A NEW GLUCOMETER  (ACCUE CHECK GUIDE PT NEEDS SUPPLIES THAT WILL FIT THE GLUCOMETER   PLEASE ADVICE

## 2021-05-14 ENCOUNTER — TELEPHONE (OUTPATIENT)
Dept: FAMILY MEDICINE CLINIC | Facility: CLINIC | Age: 61
End: 2021-05-14

## 2021-05-14 NOTE — TELEPHONE ENCOUNTER
SIGNATURES NEEDED FOR [facility] written order FORM RECEIVED VIA FAX  WILL BE PLACED IN FORM BIN FOR MA PICKUP        Accu-chek Guide Test Strip

## 2021-05-14 NOTE — TELEPHONE ENCOUNTER
Form picked up by clinical 05/14/21  Patient will be notified within 5 business days of completion of forms/if we are unable to complete

## 2021-05-18 ENCOUNTER — IMMUNIZATIONS (OUTPATIENT)
Dept: FAMILY MEDICINE CLINIC | Facility: HOSPITAL | Age: 61
End: 2021-05-18

## 2021-05-18 DIAGNOSIS — Z23 ENCOUNTER FOR IMMUNIZATION: Primary | ICD-10-CM

## 2021-05-18 PROCEDURE — 91301 SARS-COV-2 / COVID-19 MRNA VACCINE (MODERNA) 100 MCG: CPT

## 2021-05-18 PROCEDURE — 0011A SARS-COV-2 / COVID-19 MRNA VACCINE (MODERNA) 100 MCG: CPT

## 2021-05-19 DIAGNOSIS — G89.29 CHRONIC LOW BACK PAIN WITHOUT SCIATICA, UNSPECIFIED BACK PAIN LATERALITY: ICD-10-CM

## 2021-05-19 DIAGNOSIS — M54.50 CHRONIC LOW BACK PAIN WITHOUT SCIATICA, UNSPECIFIED BACK PAIN LATERALITY: ICD-10-CM

## 2021-05-19 DIAGNOSIS — K21.9 GASTROESOPHAGEAL REFLUX DISEASE: ICD-10-CM

## 2021-05-20 RX ORDER — OMEPRAZOLE 40 MG/1
CAPSULE, DELAYED RELEASE ORAL
Qty: 90 CAPSULE | Refills: 0 | Status: SHIPPED | OUTPATIENT
Start: 2021-05-20 | End: 2021-10-11

## 2021-05-20 RX ORDER — IBUPROFEN 800 MG/1
TABLET ORAL
Qty: 60 TABLET | Refills: 0 | Status: SHIPPED | OUTPATIENT
Start: 2021-05-20 | End: 2021-07-26

## 2021-05-24 DIAGNOSIS — M54.50 CHRONIC LOW BACK PAIN WITHOUT SCIATICA, UNSPECIFIED BACK PAIN LATERALITY: ICD-10-CM

## 2021-05-24 DIAGNOSIS — G89.29 CHRONIC LOW BACK PAIN WITHOUT SCIATICA, UNSPECIFIED BACK PAIN LATERALITY: ICD-10-CM

## 2021-05-24 DIAGNOSIS — E11.9 TYPE 2 DIABETES MELLITUS WITHOUT COMPLICATION, WITHOUT LONG-TERM CURRENT USE OF INSULIN (HCC): ICD-10-CM

## 2021-05-24 DIAGNOSIS — L89.324 STAGE IV PRESSURE ULCER OF LEFT BUTTOCK (HCC): ICD-10-CM

## 2021-05-26 ENCOUNTER — TELEPHONE (OUTPATIENT)
Dept: FAMILY MEDICINE CLINIC | Facility: CLINIC | Age: 61
End: 2021-05-26

## 2021-05-28 RX ORDER — LANCETS
EACH MISCELLANEOUS DAILY
Qty: 306 EACH | Refills: 2 | Status: SHIPPED | OUTPATIENT
Start: 2021-05-28 | End: 2021-07-01 | Stop reason: SDUPTHER

## 2021-05-28 RX ORDER — OXYCODONE HYDROCHLORIDE 20 MG/1
20 TABLET ORAL 3 TIMES DAILY PRN
Qty: 90 TABLET | Refills: 0 | Status: SHIPPED | OUTPATIENT
Start: 2021-05-28 | End: 2021-07-01 | Stop reason: SDUPTHER

## 2021-05-28 RX ORDER — BLOOD SUGAR DIAGNOSTIC
1 STRIP MISCELLANEOUS DAILY
Qty: 50 EACH | Refills: 0 | Status: SHIPPED | OUTPATIENT
Start: 2021-05-28 | End: 2021-09-29 | Stop reason: SDUPTHER

## 2021-06-09 ENCOUNTER — OFFICE VISIT (OUTPATIENT)
Dept: WOUND CARE | Facility: HOSPITAL | Age: 61
End: 2021-06-09
Payer: MEDICARE

## 2021-06-09 VITALS
DIASTOLIC BLOOD PRESSURE: 96 MMHG | TEMPERATURE: 97.5 F | SYSTOLIC BLOOD PRESSURE: 156 MMHG | RESPIRATION RATE: 18 BRPM | HEART RATE: 60 BPM

## 2021-06-09 DIAGNOSIS — L89.223 STAGE III PRESSURE ULCER OF LEFT HIP (HCC): Primary | ICD-10-CM

## 2021-06-09 DIAGNOSIS — E55.9 VITAMIN D DEFICIENCY: ICD-10-CM

## 2021-06-09 DIAGNOSIS — L89.154 STAGE IV PRESSURE ULCER OF SACRAL REGION (HCC): ICD-10-CM

## 2021-06-09 PROCEDURE — 11042 DBRDMT SUBQ TIS 1ST 20SQCM/<: CPT | Performed by: FAMILY MEDICINE

## 2021-06-09 RX ORDER — LIDOCAINE HYDROCHLORIDE 40 MG/ML
5 SOLUTION TOPICAL ONCE
Status: COMPLETED | OUTPATIENT
Start: 2021-06-09 | End: 2021-06-09

## 2021-06-09 RX ADMIN — LIDOCAINE HYDROCHLORIDE 5 ML: 40 SOLUTION TOPICAL at 10:59

## 2021-06-09 NOTE — PATIENT INSTRUCTIONS
Orders Placed This Encounter   Procedures    Wound cleansing and dressings     Wound cleansing and dressings       Left buttock  Cleanse/Irrigate wound with Normal Saline  - Please ensure the tunnel at 11 oclock and Undermining at 3-12 oclock are  irrigated well with NSS  Cleanse gaby-wound skin with pH balanced soap and water  Apply skin prep to periwound  Apply primary dressing: Loosely pack with dakins moistened gauze X 7 days  Then resume saline moistened gauze and if  The wound looks worse or has an odor then apply dakins moistened packing for 7 days  Apply secondary dressing: - 4x4s then ABD pad  Secure with: - medfix tape  Change dressing every day and as needed if soiled      Wound Left Hip  Cleanse/Irrigate wound with Normal Saline  Do not get dressing wet  Apply skin prep to periwound   Apply primary dressing: -hydrocolloid  Cover with ABD from above dressing  Secure with: - medfix tape  Change dressing 2 x per week and as needed if soiled or displaced          HOME CARE  Continue home care services/skilled nursing - 3 x per week (family to do in between)     OFF-LOADING  Avoid pressure at wound site  - all wounds, including healed L medial knee  Wheelchair Cushion  - ROHO cushion  Do Not Sit for Long Periods of Time   - 1 hr max for meals only, otherwise in bed and turn side to side at least  every 3 hours or more frequently  Reposition in regular bed for now at least every 1-2 hours while awake      Make sure you get your blood work, preferably before your next appointment      Follow up with Dr Lacie Sanchez in 4 weeks     Standing Status:   Future     Standing Expiration Date:   6/9/2022

## 2021-06-09 NOTE — PROGRESS NOTES
Patient ID: Gabriella Rodriguez is a 62 y o  male Date of Birth 1960       Chief Complaint   Patient presents with    Follow Up Wound Care Visit     buttocks wound        Allergies:  Patient has no known allergies  Diagnosis:   Diagnosis ICD-10-CM Associated Orders   1  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 ([facility])  F82 129 Wound cleansing and dressings     lidocaine (XYLOCAINE) 4 % topical solution 5 mL     Debridement   2  Stage IV pressure ulcer of sacral region ([facility])  L89 154 Wound cleansing and dressings     lidocaine (XYLOCAINE) 4 % topical solution 5 mL   3  Vitamin D deficiency  E55 9 Vitamin D 25 hydroxy        Assessment :   Stage III pressure ulcer of the left hip  Stage IV pressure ulcer of the sacral region  No significant changes  Has plastic surgery scheduled for August      Plan:   Selective debridement of pressure injury of the left hip  Hydrocolloid  Continue packing the sacrum  Patient is having preop labwork drawn via plastics, I added a vitamin-D level  Subjective:   8/26/20: Patient returns to the wound center for further evaluation of his stage IV pressure ulcer of the sacrum and stage III pressure ulcer of his hip  He is palliative care  Patient is paraplegic  Current treatment includes saline dressings  Dermagran to the hip ulcer  He has all the offloading devices  He denies any fever, chills or cough  Nothing else is change for him  His pain is unchanged  Is neuropathic     10/1/20: Followup stage IV pressure ulcer of the sacrum and stage III of the left hip  Nothing much is changed  He has no fever, pain, chills  Continues with saline packing to the stage IV and Dermagran to the stage III  No cough  11/5/20: Followup stage IV pressure ulcer of the sacrum and stage III pressure ulcer of the left hip  Stage IV pressure ulcer of the left buttock also  Essentially, nothing is changed    He still has not made an appointment with plastic surgery and wants to wait tail after the 1st of the year  Current wound care includes saline gauze packing and Dermagran to the hip  He denies any fever, chills or cough  1/21/21: Followup stage IV pressure ulcer of the sacrum/left buttock  Also stage III of the left hip  Patient states that he gave up his air mattress and now is in a regular bed  He feels that he can offload just as well  Did not want to use the air mattress any longer  He states that he needs a referral to plastic surgery  Saline packing is being used to the large ulcer and Dermagran to the left hip  He denies any fever, chills or cough  3/3/21: Followup stage IV pressure ulcer of the left buttock and stage III of the left hip  No changes  He has appointment plastic surgery next week  No longer has any specialty mattress as per his choice  He states nothing else is new  Denies any fever, chills or cough  4/8/21:  Followup stage IV pressure ulcer of the left buttock and stage III of the left hip  He offers no complaints and denies any pain  He has an appointment with plastic surgery next month  For some reason did not make his appointment last month  Denies any fever, chills or cough  6/9/21: Followup stage IV pressure injury of the left buttock and stage III of the left hip  No new complaints  He does have plastic surgery scheduled for August   No fever or chills  Nothing else has changed          The following portions of the patient's history were reviewed and updated as appropriate:   Patient Active Problem List   Diagnosis    Stage IV pressure ulcer of sacral region ([address] )    Stage IV pressure ulcer of left heel ([address] )    Cyst of joint of shoulder    Anemia    Paraplegic spinal paralysis ([address] )    Sinus tachycardia    GERD (gastroesophageal reflux disease)    History of osteomyelitis    Anxiety    Amputated right leg (HCC)    Benign essential hypertension    Diabetes mellitus type II, controlled ([address] )    Diarrhea    Decubitus ulcer of ischium, left, stage IV (HCC)    Chronic, continuous use of opioids    Colostomy in place [facility])    Colon cancer screening    Dyspepsia    Epigastric pain    Osteomyelitis of pelvic region [facility])    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HCC)    Neurogenic bladder    Sepsis with hypotension (HCC)    History of Clostridium difficile colitis    Hyperkalemia    Stage II pressure ulcer of buttock (HCC)    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, stage 3 (HCC)    SBO [facility] bowel obstruction) ([facility]    Sepsis ([facility]    Hypokalemia    Renal cyst    Other male erectile dysfunction    Prostate cancer screening     Past Medical History:   Diagnosis Date    Anemia     Blind     r eye    Chronic cystitis     Colostomy in place [facility])     Detrusor sphincter dyssynergia     Diabetes mellitus ([address] )     Poorly controlled type 2; Last Assessed:  3/18/14    Erectile dysfunction     Frequency of urination     GERD (gastroesophageal reflux disease)     History of diabetes mellitus     History of osteomyelitis     Hx of leg amputation ([facility])     r high upper leg    Hyperlipidemia     Hypertension     Hypospadias     Incomplete bladder emptying     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([address] ) 9/16/2019    Neurogenic bladder     Paralysis ([address] )     Paraplegia (HCC)     Spinal cord cysts     Ulcer of sacral region ([address] )     Urge incontinence      Past Surgical History:   Procedure Laterality Date    AMPUTATION      At hip; Last Assessed:  1/19/16    BLADDER SURGERY      COLON SURGERY      llq ostomy pouch    COLOSTOMY      COMPLEX CYSTOMETROGRAM  2014    CT CYSTOGRAM  9/19/2019    CYSTOSCOPY  2014    IR PICC REPOSITION  9/23/2019    IR SUPRAPUBIC CATHETER PLACEMENT  9/19/2019    LEG AMPUTATION      MEATOTOMY      PENILE PROSTHESIS  REMOVAL N/A 11/1/2019    Procedure: REMOVAL PROSTHESIS PENILE;  Surgeon: Iwona Coats MD;  Location: [facility];  Service: Urology    PENILE PROSTHESIS IMPLANT  2011    WY ADJ TISS XFER SCALP,EXTREM 10 1-30 SQCM Left 2017    Procedure: POSTERIOR THIGH V-Y ADMANCEMENT;  Surgeon: Mary Maria MD;  [facility];  Service: Plastics    WY MUSCLE-SKIN FLAP,TRUNK Left 2017    Procedure: FLAP CLOSURE LEFT ISCHIAL WOUND and "RIGHT" ISCHIAL FLAP ADVANCEMENT * DEBRIDEMENT, VAC PLACEMENT ;  Surgeon: Mary Maria MD;  [facility];  Service: Plastics    WY MUSCLE-SKIN FLAP,TRUNK Left 2017    Procedure: gluteal myocutaneous rotational flap, posterior thigh v to y advancement- wound 5 x 2 5 x 8;  Surgeon: Mary Maria MD;  [facility];  Service: Plastics    SPINE SURGERY      Lower back    UROFLOWMETRY SIMPLE / COMPLEX       Family History   Problem Relation Age of Onset    No Known Problems Mother     No Known Problems Father      Social History     Socioeconomic History    Marital status: /Civil Union     Spouse name: None    Number of children: None    Years of education: None    Highest education level: None   Occupational History    None   Social Needs    Financial resource strain: None    Food insecurity     Worry: None     Inability: None    Transportation needs     Medical: None     Non-medical: None   Tobacco Use    Smoking status: Former Smoker     Packs/day: 0 50     Years: 10 00     Pack years: 5 00     Quit date:      Years since quittin 4    Smokeless tobacco: Never Used    Tobacco comment: Onset date:  11/10/17   Substance and Sexual Activity    Alcohol use: Yes     Frequency: 2-4 times a month     Comment: Per [employer]:  Social drinker (Onset date:  11/10/17)    Drug use: No    Sexual activity: None   Lifestyle    Physical activity     Days per week: None     Minutes per session: None    Stress: None   Relationships    Social connections     Talks on phone: None     Gets together: None     Attends Confucianism service: None     Active member of club or organization: None     Attends meetings of clubs or organizations: None     Relationship status: None    Intimate partner violence     Fear of current or ex partner: None     Emotionally abused: None     Physically abused: None     Forced sexual activity: None   Other Topics Concern    None   Social History Narrative    Native language Serbian    [employer]    Social history reviewed, unchanged       Current Outpatient Medications:     Accu-Chek FastClix Lancets MISC, Inject under the skin daily Use as directed, Disp: 306 each, Rfl: 2    acetaminophen (TYLENOL) 325 mg tablet, Take 2 tablets (650 mg total) by mouth every 6 (six) hours as needed for mild pain, headaches or fever, Disp: 30 tablet, Rfl: 0    ASPIRIN LOW DOSE 81 MG EC tablet, TAKE 1 TABLET BY MOUTH EVERY DAY (Patient not taking: Reported on 7/22/2020), Disp: 30 tablet, Rfl: 0    Blood Glucose Monitoring Suppl (ONE TOUCH ULTRA 2) w/Device KIT, by Does not apply route daily, Disp: 100 each, Rfl: 3    Disposable Gloves (LATEX GLOVES LARGE) MISC, Use as needed up to 3 times a day dispo 2 boxes, Disp: 2 each, Rfl: 11    glucose blood (Accu-Chek Guide) test strip, Use 1 each daily Use as instructed, Disp: 50 each, Rfl: 0    ibuprofen (MOTRIN) 800 mg tablet, take 1 tablet by mouth every 8 hours if needed for MODERATE PAIN ( PAIN SCALE 4-6 ), Disp: 60 tablet, Rfl: 0    Incontinence Supply Disposable (SUNBEAM INCONTINENT UNDER PAD) MISC, Use 1 per week, dispense 4 reusable underpads, Disp: 4 each, Rfl: 11    Incontinence Supply Disposable MISC, by Does not apply route 2 (two) times a day, Disp: 60 each, Rfl: 11    naloxone (NARCAN) 4 mg/0 1 mL nasal spray, Administer 1 spray into a nostril  If no response after 2-3 minutes, give another dose in the other nostril using a new spray   (Patient not taking: Reported on 2/23/2021), Disp: 1 each, Rfl: 1    Nutritional Supplements (GLUCERNA SHAKE) LIQD, Take 3 Cans by mouth 3 (three) times a day, Disp: 90 Can, Rfl: 11    omeprazole (PriLOSEC) 20 mg delayed release capsule, Take 1 capsule (20 mg total) by mouth daily (Patient not taking: Reported on 2/26/2020), Disp: 90 capsule, Rfl: 0    omeprazole (PriLOSEC) 40 MG capsule, take 1 capsule by mouth once daily, Disp: 90 capsule, Rfl: 0    oxyCODONE (ROXICODONE) 20 MG TABS, Take 1 tablet (20 mg total) by mouth 3 (three) times a day as needed for moderate painMax Daily Amount: 60 mg, Disp: 90 tablet, Rfl: 0    SODIUM HYPOCHLORITE 0 25 percent, USE UTD, Disp: , Rfl:   No current facility-administered medications for this visit  Review of Systems   Constitutional: Negative for activity change, appetite change, chills, fatigue and fever  HENT: Negative for congestion, hearing loss and postnasal drip  Eyes: Negative for visual disturbance  Respiratory: Negative for cough and shortness of breath  Cardiovascular: Negative for chest pain and leg swelling  Gastrointestinal: Negative for abdominal pain, constipation, diarrhea and nausea  Endocrine: Negative  Genitourinary: Negative for dysuria and urgency  Musculoskeletal: Positive for gait problem (Nonambulatory)  Skin: Positive for wound (Left hip and sacrum)  Negative for rash  Neurological: Positive for weakness (Paraplegic)  Paraplegia and neuropathy   Hematological: Does not bruise/bleed easily  Psychiatric/Behavioral: Negative  Objective:  /96   Pulse 60   Temp 97 5 °F (36 4 °C)   Resp 18   Pain Score:   5     Physical Exam  Vitals signs and nursing note reviewed  Constitutional:       Appearance: Normal appearance  He is well-developed and normal weight  HENT:      Head: Normocephalic and atraumatic  Skin:     General: Skin is warm and dry  Findings: Wound present  Comments: Left buttock ulcer has clean, granulation tissue  No significant change  Tunnel is unchanged no probe to bone  Left hip ulcer with slough in the base  No surrounding erythema  No malodor     Neurological: Mental Status: He is alert and oriented to person, place, and time  Psychiatric:         Attention and Perception: Attention normal          Mood and Affect: Mood and affect normal          Behavior: Behavior is cooperative  Cognition and Memory: Cognition normal            Wound 08/26/20 Buttocks Left (Active)   Wound Image   06/09/21 1029   Wound Description Epithelialization;Granulation tissue;Probes to bone;Slough 06/09/21 1031   Pressure Injury Stage 4 06/09/21 1031   Shelby-wound Assessment Scar Tissue; Maceration 06/09/21 1031   Wound Length (cm) 3 cm 06/09/21 1031   Wound Width (cm) 2 cm 06/09/21 1031   Wound Depth (cm) 1 cm 06/09/21 1031   Wound Surface Area (cm^2) 6 cm^2 06/09/21 1031   Wound Volume (cm^3) 6 cm^3 06/09/21 1031   Calculated Wound Volume (cm^3) 6 cm^3 06/09/21 1031   Change in Wound Size % 79 17 06/09/21 1031   Tunneling 5 cm 05/10/21 0939   Tunneling in depth located at 4 cm @ 6 o clock, 5 cm at 9 o clock 06/09/21 1031   Undermining 4 06/09/21 1031   Undermining is depth extending from 3-12 06/09/21 1031   Drainage Amount Large 06/09/21 1031   Drainage Description Serosanguineous; Foul smelling 06/09/21 1031   Non-staged Wound Description Full thickness 06/09/21 1031   Treatments Cleansed 06/09/21 1031   Wound packed? Yes 01/21/21 1050   Packing- # removed 1 11/05/20 1036   Dressing Changed Changed 06/09/21 1031   Patient Tolerance Tolerated well 06/09/21 1031   Dressing Status Removed 06/09/21 1031       Wound 08/26/20 Hip Left (Active)   Wound Image       06/09/21 1055   Wound Description Granulation tissue;Slough; Epithelialization 05/10/21 0937   Pressure Injury Stage 3 05/10/21 0937   Shelby-wound Assessment Scar Tissue; Intact 05/10/21 0937   Wound Length (cm) 1 1 cm 05/10/21 0937   Wound Width (cm) 2 4 cm 05/10/21 0937   Wound Depth (cm) 0 1 cm 05/10/21 0937   Wound Surface Area (cm^2) 2 64 cm^2 05/10/21 0937   Wound Volume (cm^3) 0 26 cm^3 05/10/21 0937   Calculated Wound Volume (cm^3) 0 26 cm^3 05/10/21 0937   Change in Wound Size % -13 04 05/10/21 0937   Undermining 0 3 05/10/21 0937   Undermining is depth extending from 5-8 05/10/21 0937   Drainage Amount Moderate 05/10/21 0937   Drainage Description Serosanguineous 05/10/21 0937   Non-staged Wound Description Full thickness 05/10/21 0937   Treatments Cleansed 04/08/21 0959   Dressing Changed Changed 04/08/21 0959   Patient Tolerance Tolerated well 01/21/21 1052   Dressing Status Intact 05/10/21 0937            Debridement   Wound 08/26/20 Hip Left    Universal Protocol:  Consent: Verbal consent obtained  Written consent obtained  Consent given by: patient  Time out: Immediately prior to procedure a "time out" was called to verify the correct patient, procedure, equipment, support staff and site/side marked as required    Patient understanding: patient states understanding of the procedure being performed  Patient identity confirmed: verbally with patient      Performed by: physician  Debridement type: surgical  Level of debridement: subcutaneous tissue  Pain control: lidocaine 4%  Post-debridement measurements  Length (cm): 1 4  Width (cm): 2 6  Depth (cm): 0 2  Percent debrided: 100%  Surface Area (cm^2): 3 64  Area debrided (cm^2): 3 64  Volume (cm^3): 0 73  Tissue and other material debrided: dermis, subcutaneous tissue and Fibrotic  Devitalized tissue debrided: necrotic debris, slough and Fibrotic  Instrument(s) utilized: curette  Bleeding: medium  Hemostasis obtained with: pressure  Procedural pain (0-10): 0  Post-procedural pain: 0   Response to treatment: procedure was tolerated well                   Wound Instructions:  Orders Placed This Encounter   Procedures    Wound cleansing and dressings     Wound cleansing and dressings       Left buttock  Cleanse/Irrigate wound with Normal Saline  - Please ensure the tunnel at 11 oclock and Undermining at 3-12 oclock are  irrigated well with NSS  Cleanse gaby-wound skin with pH balanced soap and water  Apply skin prep to periwound  Apply primary dressing: Loosely pack with dakins moistened gauze X 7 days  Then resume saline moistened gauze and if  The wound looks worse or has an odor then apply dakins moistened packing for 7 days  Apply secondary dressing: - 4x4s then ABD pad  Secure with: - medfix tape  Change dressing every day and as needed if soiled      Wound Left Hip  Cleanse/Irrigate wound with Normal Saline  Do not get dressing wet  Apply skin prep to periwound   Apply primary dressing: -hydrocolloid  Cover with ABD from above dressing  Secure with: - medfix tape  Change dressing 2 x per week and as needed if soiled or displaced          HOME CARE  Continue home care services/skilled nursing - 3 x per week (family to do in between)     OFF-LOADING  Avoid pressure at wound site  - all wounds, including healed L medial knee  Wheelchair Cushion  - ROHO cushion  Do Not Sit for Long Periods of Time  - 1 hr max for meals only, otherwise in bed and turn side to side at least  every 3 hours or more frequently  Reposition in regular bed for now at least every 1-2 hours while awake      Make sure you get your blood work, preferably before your next appointment      Follow up with Dr Toan Canales in 4 weeks     Standing Status:   Future     Standing Expiration Date:   6/9/2022    Debridement     This order was created via procedure documentation    Vitamin D 25 hydroxy     Standing Status:   Future     Standing Expiration Date:   6/9/2022        Josse Lynch MD, CHT, CWS       Portions of the record may have been created with voice recognition software  Occasional wrong word or "sound alike" substitutions may have occurred due to the inherent limitations of voice recognition software  Read the chart carefully and recognize, using context, where substitutions have occurred

## 2021-06-15 ENCOUNTER — IMMUNIZATIONS (OUTPATIENT)
Dept: FAMILY MEDICINE CLINIC | Facility: HOSPITAL | Age: 61
End: 2021-06-15

## 2021-06-15 DIAGNOSIS — Z23 ENCOUNTER FOR IMMUNIZATION: Primary | ICD-10-CM

## 2021-06-15 PROCEDURE — 91301 SARS-COV-2 / COVID-19 MRNA VACCINE (MODERNA) 100 MCG: CPT

## 2021-06-15 PROCEDURE — 0012A SARS-COV-2 / COVID-19 MRNA VACCINE (MODERNA) 100 MCG: CPT

## 2021-07-01 DIAGNOSIS — E11.9 TYPE 2 DIABETES MELLITUS WITHOUT COMPLICATION, WITHOUT LONG-TERM CURRENT USE OF INSULIN (HCC): ICD-10-CM

## 2021-07-01 DIAGNOSIS — L89.324 STAGE IV PRESSURE ULCER OF LEFT BUTTOCK (HCC): ICD-10-CM

## 2021-07-01 DIAGNOSIS — G89.29 CHRONIC LOW BACK PAIN WITHOUT SCIATICA, UNSPECIFIED BACK PAIN LATERALITY: ICD-10-CM

## 2021-07-01 DIAGNOSIS — M54.50 CHRONIC LOW BACK PAIN WITHOUT SCIATICA, UNSPECIFIED BACK PAIN LATERALITY: ICD-10-CM

## 2021-07-02 RX ORDER — OXYCODONE HYDROCHLORIDE 20 MG/1
20 TABLET ORAL 3 TIMES DAILY PRN
Qty: 90 TABLET | Refills: 0 | Status: SHIPPED | OUTPATIENT
Start: 2021-07-02 | End: 2021-08-02 | Stop reason: SDUPTHER

## 2021-07-02 RX ORDER — LANCETS
EACH MISCELLANEOUS DAILY
Qty: 306 EACH | Refills: 0 | Status: SHIPPED | OUTPATIENT
Start: 2021-07-02 | End: 2021-07-02 | Stop reason: SDUPTHER

## 2021-07-15 ENCOUNTER — OFFICE VISIT (OUTPATIENT)
Dept: PLASTIC SURGERY | Facility: CLINIC | Age: 61
End: 2021-07-15

## 2021-07-15 VITALS
TEMPERATURE: 97.2 F | WEIGHT: 175 LBS | HEIGHT: 67 IN | SYSTOLIC BLOOD PRESSURE: 122 MMHG | HEART RATE: 64 BPM | DIASTOLIC BLOOD PRESSURE: 83 MMHG | BODY MASS INDEX: 27.47 KG/M2

## 2021-07-15 DIAGNOSIS — L89.324 DECUBITUS ULCER OF ISCHIUM, LEFT, STAGE IV (HCC): Primary | ICD-10-CM

## 2021-07-15 PROCEDURE — PREOP: Performed by: PLASTIC SURGERY

## 2021-07-15 NOTE — PROGRESS NOTES
Assessment/Plan   Diagnoses and all orders for this visit:    Decubitus ulcer of ischium, left, stage IV ([facility] [address] )      During today's  30 minute visit, all of which was dedicated to counseling, I reviewed the anticipated preoperative, intraoperative, and postoperative courses  I informed him that the nurses will contact the patient the day prior to surgery in order to discuss orientation and logistical information  I will then see him the day of surgery where I will answer any last minute questions and perform my preoperative markings  We will then proceed to the operating room for an anticipated 3 hour procedure under general anesthesia, specifically a  Left ischium ulcer excision, ostectomy, reconstruction with posterior thigh flap     For each procedure, I reviewed the incisions/scars, duration, recovery time, postoperative restrictions, and follow-up  When applicable, I discussed hospitalization, dressing changes, drains, and donor site morbidity  All risks, benefits, and options, including but not limited to, pain, scarring, bleeding, infection, asymmetry, contour deformity, damage to adjacent nerves, vessels, and organs, hematoma, seroma, paresthesias, anesthesia (temporary versus permanent), skin necrosis, fat necrosis, flap necrosis, wound dehiscence with need for healing by secondary intention and postoperative dressing changes, hypertrophic and/or keloid scar formation, deep venous thrombosis, pulmonary embolism, recurrence, and need for revision and/or reoperation were fully explained to the patient  All questions were answered  Once full understanding of the anticipated procedure was verified, informed consent was obtained  It was emphasized to the patient that postoperatively, it is mandated to employ a high-protein, low salt diet, take deep breaths in order to avoid atelectasis, and ambulate at least 5 times a day in order to avoid deep venous thrombosis and pulmonary embolism      At the conclusion of our conversation, the patient wished to proceed with surgery  [address]   [address] [facility], [address]    Office: [phone]      Subjective   Patient ID: Nehal Valente is a 62 y o  male  Nehal Valente is being seen today for preoperative evaluation  Since last visit, there has not been any changes in the patient's overall health status and/or surgical plan to report  Patient has a history of paraplegia  Patient continues to with diet supplementation  They present preoperatively for planned: Left ischium ulcer excision, ostectomy, reconstruction with posterior thigh flap     Scheduled for: August 2021    In conjunction with: n/a     The following history of N/V post operative: none    Nicotine status: none    Vitals:    07/15/21 1022   BP: 122/83   Pulse: 64   Temp: (!) 97 2 °F (36 2 °C)     Mr Dick Ureña a 63years old male with history of quadriplegic who presents for evaluation of left ischium wound  Patient has had this wound for many years and is recently s/p left posterior thigh V-Y advancement flap and right ischial flap in 5/01/2017 by Dr Candace Evans and unfortunately sustained a heating pad burn and wound dehiscence that require surgery on 9/27/2017 with gluteal myocutaneous rotational flap, posterior V-Y re-advancement  Therafter patient did well but developed recurrence of wound and is here for evaluation  Patient currently lays on his stomach mostly to avoid pressure to the area   He was ordered a MRI on prior encounter to evaluate for osteomyelitis extend but he is claustrophobic, and was therefore ordered a CT but he has not obtained this    Of notice, patient had prior decubitus ulcers on his right trunk for which underwent multiple operations, including a hip disarticulation, amputation and anterior thigh flap at outside facility       Since our last visit patient manifest is doing well, no recent hospitalization  He is eating more calories and protein and has put on some weight  Otherwise no concerns  He would like to pursue options for wound reconstruction           The following portions of the patient's history were reviewed and updated as appropriate: allergies, current medications, past family history, past medical history, past social history, past surgical history and problem list     Review of Systems    Objective   Physical Exam   Constitutional  He appears well-nourished  Cardiovascular: Normal rate  Pulmonary/Chest  Effort normal      Psychiatric  He has a normal mood and affect  His behavior is normal      Abdomen and Hips  Soft  Extremities    Legs:        Legs:        Back and Buttocks         Back:          Body mass index is 27 41 kg/m²

## 2021-07-20 ENCOUNTER — TELEPHONE (OUTPATIENT)
Dept: FAMILY MEDICINE CLINIC | Facility: CLINIC | Age: 61
End: 2021-07-20

## 2021-07-20 NOTE — TELEPHONE ENCOUNTER
Pt left a message on the nurse line stating the pharmacy advised him that a form was faxed to our office they need it more information in order to cover the lancets

## 2021-07-26 DIAGNOSIS — G89.29 CHRONIC LOW BACK PAIN WITHOUT SCIATICA, UNSPECIFIED BACK PAIN LATERALITY: ICD-10-CM

## 2021-07-26 DIAGNOSIS — M54.50 CHRONIC LOW BACK PAIN WITHOUT SCIATICA, UNSPECIFIED BACK PAIN LATERALITY: ICD-10-CM

## 2021-07-26 RX ORDER — IBUPROFEN 800 MG/1
TABLET ORAL
Qty: 60 TABLET | Refills: 0 | Status: SHIPPED | OUTPATIENT
Start: 2021-07-26 | End: 2021-10-12

## 2021-07-27 ENCOUNTER — TELEPHONE (OUTPATIENT)
Dept: FAMILY MEDICINE CLINIC | Facility: CLINIC | Age: 61
End: 2021-07-27

## 2021-07-29 ENCOUNTER — OFFICE VISIT (OUTPATIENT)
Dept: WOUND CARE | Facility: HOSPITAL | Age: 61
End: 2021-07-29
Payer: MEDICARE

## 2021-07-29 VITALS
DIASTOLIC BLOOD PRESSURE: 72 MMHG | RESPIRATION RATE: 18 BRPM | TEMPERATURE: 98.4 F | SYSTOLIC BLOOD PRESSURE: 100 MMHG | HEART RATE: 82 BPM

## 2021-07-29 DIAGNOSIS — L89.154 STAGE IV PRESSURE ULCER OF SACRAL REGION (HCC): Primary | ICD-10-CM

## 2021-07-29 DIAGNOSIS — L89.133 PRESSURE ULCER OF RIGHT LOWER BACK, STAGE 3 (HCC): ICD-10-CM

## 2021-07-29 DIAGNOSIS — L89.223 STAGE III PRESSURE ULCER OF LEFT HIP (HCC): ICD-10-CM

## 2021-07-29 PROCEDURE — 11042 DBRDMT SUBQ TIS 1ST 20SQCM/<: CPT | Performed by: FAMILY MEDICINE

## 2021-07-29 PROCEDURE — 99213 OFFICE O/P EST LOW 20 MIN: CPT | Performed by: FAMILY MEDICINE

## 2021-07-29 RX ORDER — LIDOCAINE HYDROCHLORIDE 40 MG/ML
5 SOLUTION TOPICAL ONCE
Status: COMPLETED | OUTPATIENT
Start: 2021-07-29 | End: 2021-07-29

## 2021-07-29 RX ADMIN — LIDOCAINE HYDROCHLORIDE 5 ML: 40 SOLUTION TOPICAL at 11:41

## 2021-07-29 NOTE — PROGRESS NOTES
Patient ID: Kenisha Dunaway is a 62 y o  male Date of Birth 1960       Chief Complaint   Patient presents with    Follow Up Wound Care Visit     Left buttock and hip wounds  Patient presents today with new pressure ulcer on the R back  He reports it has been present for approx 2 weeks and feel it is a result of "something in the chair"  He is currently scheduled flap surgery next week  Allergies:  Patient has no known allergies  Diagnosis:   Diagnosis ICD-10-CM Associated Orders   1  Stage IV pressure ulcer of sacral region [facility])  L89 154 Wound cleansing and dressings   2  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 ([facility])  Y16 115 Wound cleansing and dressings   3  Pressure ulcer of right lower back, stage 3 ([facility])  L89 133 lidocaine (XYLOCAINE) 4 % topical solution 5 mL     Wound cleansing and dressings     Debridement        Assessment :   Stage IV left ischial/sacral pressure injury  Scheduled for flap closure on 8/11/21  Stage III pressure injury the left hip  Both ulcers are clean and mostly granular  New pressure injury stage III of the right lower back  Believes that may be related to something in his wheelchair  He removed it  Plan:   Surgery [id number]  He will have reconstruction with posterior thigh flap  However, he now has a stage III pressure injury of the right lower back/flank  Surgically debrided today  [employer] gregor  Would be helpful if this could be excised and closed during surgery  He also has the chronic left hip pressure injury  Do not know if this will be included in the planned reconstruction  See orders  Subjective:   8/26/20: Patient returns to the wound center for further evaluation of his stage IV pressure ulcer of the sacrum and stage III pressure ulcer of his hip  He is palliative care  Patient is paraplegic  Current treatment includes saline dressings  Dermagran to the hip ulcer  He has all the offloading devices  He denies any fever, chills or cough  Nothing else is change for him  His pain is unchanged  Is neuropathic     10/1/20: Followup stage IV pressure ulcer of the sacrum and stage III of the left hip  Nothing much is changed  He has no fever, pain, chills  Continues with saline packing to the stage IV and Dermagran to the stage III  No cough  11/5/20: Followup stage IV pressure ulcer of the sacrum and stage III pressure ulcer of the left hip  Stage IV pressure ulcer of the left buttock also  Essentially, nothing is changed  He still has not made an appointment with plastic surgery and wants to wait tail after the 1st of the year  Current wound care includes saline gauze packing and Dermagran to the hip  He denies any fever, chills or cough  1/21/21: Followup stage IV pressure ulcer of the sacrum/left buttock  Also stage III of the left hip  Patient states that he gave up his air mattress and now is in a regular bed  He feels that he can offload just as well  Did not want to use the air mattress any longer  He states that he needs a referral to plastic surgery  Saline packing is being used to the large ulcer and Dermagran to the left hip  He denies any fever, chills or cough  3/3/21: Followup stage IV pressure ulcer of the left buttock and stage III of the left hip  No changes  He has appointment plastic surgery next week  No longer has any specialty mattress as per his choice  He states nothing else is new  Denies any fever, chills or cough  4/8/21:  Followup stage IV pressure ulcer of the left buttock and stage III of the left hip  He offers no complaints and denies any pain  He has an appointment with plastic surgery next month  For some reason did not make his appointment last month  Denies any fever, chills or cough  6/9/21: Followup stage IV pressure injury of the left buttock and stage III of the left hip  No new complaints  He does have plastic surgery scheduled for August   No fever or chills    Nothing else has changed  7/29/21: Followup stage IV pressure injury of the left ischium and stage III of the left hip  Patient states that he now has a new pressure injury of the right lower back/flank that he has noticed for about two weeks  He believes that it may been something in his wheelchair causing pressure  He removed but he thought was causing the problem  Saline packing has been used by nursing  No other new complaints other than the new ulcer  No fever or chills          The following portions of the patient's history were reviewed and updated as appropriate:   Patient Active Problem List   Diagnosis    Stage IV pressure ulcer of sacral region ([address] )    Stage IV pressure ulcer of left heel ([address] )    Cyst of joint of shoulder    Anemia    Paraplegic spinal paralysis ([address] )    Sinus tachycardia    GERD (gastroesophageal reflux disease)    History of osteomyelitis    Anxiety    Amputated right leg (HCC)    Benign essential hypertension    Diabetes mellitus type II, controlled ([address] )    Diarrhea    Decubitus ulcer of ischium, left, stage IV (HCC)    Chronic, continuous use of opioids    Colostomy in place [facility])    Colon cancer screening    Dyspepsia    Epigastric pain    Osteomyelitis of pelvic region ([address] )    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[address] )    Neurogenic bladder    Sepsis with hypotension ([address] )    History of Clostridium difficile colitis    Hyperkalemia    Stage II pressure ulcer of buttock ([address] )    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, stage 3 ([address] )    SBO (small bowel obstruction) ([address] )    Sepsis ([address] )    Hypokalemia    Renal cyst    Other male erectile dysfunction    Prostate cancer screening     Past Medical History:   Diagnosis Date    Anemia     Blind     r eye    Chronic cystitis     Col[facility] in place [facility])     Detrusor sphincter dyssynergia     Diabetes mellitus ([address] )     Poorly controlled type 2; Last Assessed:  3/18/14    Erectile dysfunction     Frequency of urination     GERD (gastroesophageal reflux disease)     History of diabetes mellitus     History of osteomyelitis     Hx of leg amputation (HCC)     r high upper leg    Hyperlipidemia     Hypertension     Hypospadias     Incomplete bladder emptying     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([facility] [address] ) 9/16/2019    Neurogenic bladder     Paralysis ([facility] [address] )     Paraplegia (HCC)     Spinal cord cysts     Ulcer of sacral region [facility])     Urge incontinence      Past Surgical History:   Procedure Laterality Date    AMPUTATION      At hip; Last Assessed:  1/19/16    BLADDER SURGERY      COLON SURGERY      llq ostomy pouch    COLOSTOMY      COMPLEX CYSTOMETROGRAM  2014    CT CYSTOGRAM  9/19/2019    CYSTOSCOPY  2014    IR PICC REPOSITION  9/23/2019    IR SUPRAPUBIC CATHETER PLACEMENT  9/19/2019    LEG AMPUTATION      MEATOTOMY      PENILE PROSTHESIS  REMOVAL N/A 11/1/2019    Procedure: REMOVAL PROSTHESIS PENILE;  Surgeon: Vince Huber MD;  Location: [facility];  Service: Urology    PENILE PROSTHESIS IMPLANT  2011    HI ADJ TISS XFER SCALP,EXTREM 10 1-30 SQCM Left 5/1/2017    Procedure: POSTERIOR THIGH V-Y ADMANCEMENT;  Surgeon: Wilberto Farmer MD;  [facility];  Service: Plastics    HI MUSCLE-SKIN FLAP,TRUNK Left 5/1/2017    Procedure: FLAP CLOSURE LEFT ISCHIAL WOUND and "RIGHT" ISCHIAL FLAP ADVANCEMENT * [place] [place] ;  Surgeon: Wilberto Farmer MD;  [facility];  Service: Plastics    HI MUSCLE-SKIN FLAP,TRUNK Left 9/27/2017    Procedure: gluteal myocutaneous rotational flap, posterior thigh v to y advancement- wound 5 x 2 5 x 8;  Surgeon: Wilberto Farmer MD;  [facility];  Service: Plastics    SPINE SURGERY      Lower back    UROFLOWMETRY SIMPLE / COMPLEX  2014     Family History   Problem Relation Age of Onset    No Known Problems Mother     No Known Problems Father      Social History     Socioeconomic History    Marital status: /Civil Union     Spouse name: None    Number of children: None    Years of education: None    Highest education level: None   Occupational History    None   Tobacco Use    Smoking status: Former Smoker     Packs/day: 0 50     Years: 10 00     Pack years: 5 00     Quit date:      Years since quittin 5    Smokeless tobacco: Never Used    Tobacco comment: Onset date:  11/10/17   Vaping Use    Vaping Use: Never used   Substance and Sexual Activity    Alcohol use: Yes     Comment: Per [employer]:  Social drinker (Onset date:  11/10/17)    Drug use: No    Sexual activity: None   Other Topics Concern    None   Social History Narrative    Native language Croatian    [employer]    Social history reviewed, unchanged     Social Determinants of Health     Financial Resource Strain:     Difficulty of Paying Living Expenses:    Food Insecurity:     Worried About Running Out of Food in the Last Year:     [address] in the Last Year:    Transportation Needs:     Lack of Transportation (Medical):      Lack of Transportation (Non-Medical):    Physical Activity:     Days of Exercise per Week:     Minutes of Exercise per Session:    Stress:     Feeling of Stress :    Social Connections:     Frequency of Communication with Friends and Family:     Frequency of Social Gatherings with Friends and Family:     Attends Mosque Services:     Active Member of Clubs or Organizations:     Attends Club or Organization Meetings:     Marital Status:    Intimate Partner Violence:     Fear of Current or Ex-Partner:     Emotionally Abused:     Physically Abused:     Sexually Abused:        Current Outpatient Medications:     Accu-Chek FastClix Lancets MISC, Inject under the skin daily Use as directed, Disp: 306 each, Rfl: 0    acetaminophen (TYLENOL) 325 mg tablet, Take 2 tablets (650 mg total) by mouth every 6 (six) hours as needed for mild pain, headaches or fever, Disp: 30 tablet, Rfl: 0    ASPIRIN LOW DOSE 81 MG EC tablet, TAKE 1 TABLET BY MOUTH EVERY DAY (Patient not taking: Reported on 7/22/2020), Disp: 30 tablet, Rfl: 0    Blood Glucose Monitoring Suppl (ONE TOUCH ULTRA 2) w/Device KIT, by Does not apply route daily, Disp: 100 each, Rfl: 3    Disposable Gloves (LATEX GLOVES LARGE) MISC, Use as needed up to 3 times a day dispo 2 boxes, Disp: 2 each, Rfl: 11    glucose blood (Accu-Chek Guide) test strip, Use 1 each daily Use as instructed, Disp: 50 each, Rfl: 0    ibuprofen (MOTRIN) 800 mg tablet, take 1 tablet by mouth every 8 hours if needed for MODERATE PAIN ( PAIN SCALE 4-6 ), Disp: 60 tablet, Rfl: 0    Incontinence Supply Disposable (SUNBEAM INCONTINENT UNDER PAD) MISC, Use 1 per week, dispense 4 reusable underpads, Disp: 4 each, Rfl: 11    Incontinence Supply Disposable MISC, by Does not apply route 2 (two) times a day, Disp: 60 each, Rfl: 11    naloxone (NARCAN) 4 mg/0 1 mL nasal spray, Administer 1 spray into a nostril  If no response after 2-3 minutes, give another dose in the other nostril using a new spray  (Patient not taking: Reported on 2/23/2021), Disp: 1 each, Rfl: 1    Nutritional Supplements (GLUCERNA SHAKE) LIQD, Take 3 Cans by mouth 3 (three) times a day, Disp: 90 Can, Rfl: 11    omeprazole (PriLOSEC) 20 mg delayed release capsule, Take 1 capsule (20 mg total) by mouth daily (Patient not taking: Reported on 2/26/2020), Disp: 90 capsule, Rfl: 0    omeprazole (PriLOSEC) 40 MG capsule, take 1 capsule by mouth once daily, Disp: 90 capsule, Rfl: 0    oxyCODONE (ROXICODONE) 20 MG TABS, Take 1 tablet (20 mg total) by mouth 3 (three) times a day as needed for moderate painMax Daily Amount: 60 mg, Disp: 90 tablet, Rfl: 0    sodium hypochlorite (DAKIN'S HALF-STRENGTH) external solution, Soak gauze and pack into wounds with each dressing change as directed , Disp: 473 mL, Rfl: 1  No current facility-administered medications for this visit      Review of Systems   Constitutional: Negative for activity change, appetite change, chills, fatigue and fever  HENT: Negative for congestion, hearing loss and postnasal drip  Eyes: Negative for visual disturbance  Respiratory: Negative for cough and shortness of breath  Cardiovascular: Negative for chest pain and leg swelling  Gastrointestinal: Negative for abdominal pain, constipation, diarrhea and nausea  Endocrine: Negative  Genitourinary: Negative for dysuria and urgency  Musculoskeletal: Positive for gait problem (Nonambulatory)  Skin: Positive for wound (Left hip and sacrum and new ulcer right flank)  Negative for rash  Neurological: Positive for weakness (Paraplegic)  Paraplegia and neuropathy   Hematological: Does not bruise/bleed easily  Psychiatric/Behavioral: Negative  Objective:  /72   Pulse 82   Temp 98 4 °F (36 9 °C)   Resp 18   Pain Score: 0-No pain     Physical Exam  Vitals and nursing note reviewed  Constitutional:       Appearance: Normal appearance  He is well-developed and normal weight  HENT:      Head: Normocephalic and atraumatic  Skin:     General: Skin is warm and dry  Findings: Wound present  Comments: Left buttock ulcer has clean, granulation tissue  No significant change  Tunnel is unchanged no probe to bone  Left hip ulcer with granulation tissue in the base     No surrounding erythema  No malodor  Ulceration on the right flank/lower back with central necrotic tissue  No malodor and minimal surrounding erythema  Neurological:      Mental Status: He is alert and oriented to person, place, and time  Psychiatric:         Attention and Perception: Attention normal          Mood and Affect: Mood and affect normal          Behavior: Behavior is cooperative  Cognition and Memory: Cognition normal                      Wound 08/26/20 Hip Left (Active)   Wound Description Granulation tissue;Slough; Epithelialization 07/29/21 1111   Pressure Injury Stage Stage 3 07/29/21 1111   Shelby-wound Assessment Scar Tissue; Intact 07/29/21 1111   Wound Length (cm) 2 cm 07/29/21 1111   Wound Width (cm) 2 3 cm 07/29/21 1111   Wound Depth (cm) 0 2 cm 07/29/21 1111   Wound Surface Area (cm^2) 4 6 cm^2 07/29/21 1111   Wound Volume (cm^3) 0 92 cm^3 07/29/21 1111   Calculated Wound Volume (cm^3) 0 92 cm^3 07/29/21 1111   Change in Wound Size % -300 07/29/21 1111   Drainage Amount Moderate 07/29/21 1111   Drainage Description Serosanguineous 07/29/21 1111   Non-staged Wound Description Full thickness 07/29/21 1111       Wound 07/29/21 Pressure Injury Back Right; Lower; Lateral (Active)   Wound Image Images linked 07/29/21 1125   Wound Description Brown 07/29/21 1112       Debridement   Wound 07/29/21 Pressure Injury Back Right; Lower; Lateral    Universal Protocol:  Consent: Verbal consent obtained  Written consent obtained  Consent given by: patient  Time out: Immediately prior to procedure a "time out" was called to verify the correct patient, procedure, equipment, support staff and site/side marked as required    Patient understanding: patient states understanding of the procedure being performed  Patient identity confirmed: verbally with patient      Performed by: physician  Debridement type: surgical  Level of debridement: subcutaneous tissue  Pain control: lidocaine 4%  Post-debridement measurements  Length (cm): 3 5  Width (cm): 3 1  Depth (cm): 2 2  Percent debrided: 100%  Surface Area (cm^2): 10 85  Area debrided (cm^2): 10 85  Volume (cm^3): 23 87  Tissue and other material debrided: adipose, dermis and subcutaneous tissue  Devitalized tissue debrided: necrotic debris and slough  Instrument(s) utilized: curette, blade and forceps  Bleeding: medium  Hemostasis obtained with: pressure  Procedural pain (0-10): insensate  Post-procedural pain: insensate   Response to treatment: procedure was tolerated well  Debridement Comments: Large amount of necrotic adipose and subcutaneous tissue excised  The area was then curetted to fresh in all of the subcutaneous tissue  Lavaged with Prophase  Wound Instructions:  Orders Placed This Encounter   Procedures    Wound cleansing and dressings     Wound cleansing and dressings          Left buttock:  Cleanse/Irrigate wound with Prophase today in wound center and then resume Normal Saline with next dressing change  Please ensure the tunnel at 11 oclock and Undermining at 3-12 oclock are  irrigated well with NSS  Cleanse gaby-wound skin with pH balanced soap and water  Apply skin prep to periwound  Apply primary dressing: Loosely pack with dakins moistened gauze until surgery  If surgery is postponed or cancelled, please resume saline moistened gauze until next visit  If wound looks worse or has an odor then switch to dakins moistened packing for 7 days and then back to saline  Apply secondary dressing: - 4x4s then ABD pad  Secure with: - medfix tape  Change dressing every day and as needed if soiled      Wound Left Hip  Cleanse/Irrigate wound with prophase today in wound center only and resume Normal Saline with next dressing change  Apply skin prep to periwound   Apply primary dressing: -hydrocolloid  Cover with ABD from above dressing  Secure with: - medfix tape  Change dressing 2 x per week and as needed if soiled or displaced       R lateral back:  Cleanse today with Prophase in wound center only  Get pack wound with aquacel AG ribbon (small piece sent with patient) and tape tail to skin  Cover with gauze, 1/2 ABD  Secure with medfix or other hypoallergenic tape  Change dressing every other day     HOME CARE  Continue home care services/skilled nursing - 3 x per week (family to do in between)     OFF-LOADING  Avoid pressure at wound site  - all wounds, including right back  Wheelchair Cushion  - ROHO cushion  Do Not Sit for Long Periods of Time   - 1 hr max for meals only, otherwise in bed and turn side to side at least  every 3 hours or more frequently  Reposition in regular bed for now at least every 1-2 hours while awake      Make sure you get your blood work this week   Please notify plastic surgery office that you have a new wound on your back     Follow up with Dr Chester Brumfield in 4 weeks unless being followed by plastic surgery    Today's Wound treatment note: All wounds cleansed with prophase  Redressed as ordered above     Standing Status:   Future     Standing Expiration Date:   7/29/2022    Debridement     This order was created via procedure documentation       Dana Lantigua MD, CHT, CWS       Portions of the record may have been created with voice recognition software  Occasional wrong word or "sound alike" substitutions may have occurred due to the inherent limitations of voice recognition software  Read the chart carefully and recognize, using context, where substitutions have occurred

## 2021-07-29 NOTE — PATIENT INSTRUCTIONS
Orders Placed This Encounter   Procedures    Wound cleansing and dressings     Wound cleansing and dressings          Left buttock:  Cleanse/Irrigate wound with Prophase today in wound center and then resume Normal Saline with next dressing change  Please ensure the tunnel at 11 oclock and Undermining at 3-12 oclock are  irrigated well with NSS  Cleanse gaby-wound skin with pH balanced soap and water  Apply skin prep to periwound  Apply primary dressing: Loosely pack with dakins moistened gauze until surgery  If surgery is postponed or cancelled, please resume saline moistened gauze until next visit  If wound looks worse or has an odor then switch to dakins moistened packing for 7 days and then back to saline  Apply secondary dressing: - 4x4s then ABD pad  Secure with: - medfix tape  Change dressing every day and as needed if soiled      Wound Left Hip  Cleanse/Irrigate wound with prophase today in wound center only and resume Normal Saline with next dressing change  Apply skin prep to periwound   Apply primary dressing: -hydrocolloid  Cover with ABD from above dressing  Secure with: - medfix tape  Change dressing 2 x per week and as needed if soiled or displaced       R lateral back:  Cleanse today with Prophase in wound center only  Get pack wound with aquacel AG ribbon (small piece sent with patient) and tape tail to skin  Cover with gauze, 1/2 ABD  Secure with medfix or other hypoallergenic tape  Change dressing every other day     HOME CARE  Continue home care services/skilled nursing - 3 x per week (family to do in between)     OFF-LOADING  Avoid pressure at wound site  - all wounds, including right back  Wheelchair Cushion  - ROHO cushion  Do Not Sit for Long Periods of Time   - 1 hr max for meals only, otherwise in bed and turn side to side at least  every 3 hours or more frequently  Reposition in regular bed for now at least every 1-2 hours while awake      Make sure you get your blood work this week Please notify plastic surgery office that you have a new wound on your back     Follow up with Dr Kinza Grissom in 4 weeks unless being followed by plastic surgery    Today's Wound treatment note:   All wounds cleansed with prophase  Redressed as ordered above     Standing Status:   Future     Standing Expiration Date:   7/29/2022

## 2021-07-30 RX ORDER — SODIUM HYPOCHLORITE 2.5 MG/ML
SOLUTION TOPICAL
Qty: 473 ML | Refills: 1 | Status: SHIPPED | OUTPATIENT
Start: 2021-07-30

## 2021-08-02 DIAGNOSIS — L89.324 STAGE IV PRESSURE ULCER OF LEFT BUTTOCK (HCC): ICD-10-CM

## 2021-08-02 DIAGNOSIS — M54.50 CHRONIC LOW BACK PAIN WITHOUT SCIATICA, UNSPECIFIED BACK PAIN LATERALITY: ICD-10-CM

## 2021-08-02 DIAGNOSIS — G89.29 CHRONIC LOW BACK PAIN WITHOUT SCIATICA, UNSPECIFIED BACK PAIN LATERALITY: ICD-10-CM

## 2021-08-02 RX ORDER — OXYCODONE HYDROCHLORIDE 20 MG/1
20 TABLET ORAL 3 TIMES DAILY PRN
Qty: 90 TABLET | Refills: 0 | Status: SHIPPED | OUTPATIENT
Start: 2021-08-02 | End: 2021-08-31 | Stop reason: SDUPTHER

## 2021-08-03 ENCOUNTER — TELEPHONE (OUTPATIENT)
Dept: FAMILY MEDICINE CLINIC | Facility: CLINIC | Age: 61
End: 2021-08-03

## 2021-08-03 NOTE — TELEPHONE ENCOUNTER
Pt left a voicemail yesterday requesting oxycodone refill  The Dr Aravind Araiza refilled yesterday

## 2021-08-13 ENCOUNTER — TELEPHONE (OUTPATIENT)
Dept: FAMILY MEDICINE CLINIC | Facility: CLINIC | Age: 61
End: 2021-08-13

## 2021-08-16 ENCOUNTER — TELEPHONE (OUTPATIENT)
Dept: FAMILY MEDICINE CLINIC | Facility: CLINIC | Age: 61
End: 2021-08-16

## 2021-08-16 NOTE — TELEPHONE ENCOUNTER
Marquise Sherman called to advise [facility] faxed over paperwork that needs to be filled out by PCP   Please advise

## 2021-08-31 DIAGNOSIS — G89.29 CHRONIC LOW BACK PAIN WITHOUT SCIATICA, UNSPECIFIED BACK PAIN LATERALITY: ICD-10-CM

## 2021-08-31 DIAGNOSIS — M54.50 CHRONIC LOW BACK PAIN WITHOUT SCIATICA, UNSPECIFIED BACK PAIN LATERALITY: ICD-10-CM

## 2021-08-31 DIAGNOSIS — L89.324 STAGE IV PRESSURE ULCER OF LEFT BUTTOCK (HCC): ICD-10-CM

## 2021-09-01 RX ORDER — OXYCODONE HYDROCHLORIDE 20 MG/1
20 TABLET ORAL 3 TIMES DAILY PRN
Qty: 90 TABLET | Refills: 0 | Status: SHIPPED | OUTPATIENT
Start: 2021-09-01 | End: 2021-09-29 | Stop reason: SDUPTHER

## 2021-09-02 ENCOUNTER — OFFICE VISIT (OUTPATIENT)
Dept: WOUND CARE | Facility: HOSPITAL | Age: 61
End: 2021-09-02
Payer: MEDICARE

## 2021-09-02 VITALS
SYSTOLIC BLOOD PRESSURE: 116 MMHG | DIASTOLIC BLOOD PRESSURE: 78 MMHG | HEART RATE: 72 BPM | TEMPERATURE: 97.1 F | RESPIRATION RATE: 18 BRPM

## 2021-09-02 DIAGNOSIS — L89.133 PRESSURE ULCER OF RIGHT LOWER BACK, STAGE 3 (HCC): ICD-10-CM

## 2021-09-02 DIAGNOSIS — G82.20 PARAPLEGIA (HCC): ICD-10-CM

## 2021-09-02 DIAGNOSIS — L89.223 STAGE III PRESSURE ULCER OF LEFT HIP (HCC): ICD-10-CM

## 2021-09-02 DIAGNOSIS — L89.154 STAGE IV PRESSURE ULCER OF SACRAL REGION (HCC): Primary | ICD-10-CM

## 2021-09-02 DIAGNOSIS — A49.02 INFECTION OF WOUND DUE TO METHICILLIN RESISTANT STAPHYLOCOCCUS AUREUS (MRSA): ICD-10-CM

## 2021-09-02 PROCEDURE — 11042 DBRDMT SUBQ TIS 1ST 20SQCM/<: CPT | Performed by: FAMILY MEDICINE

## 2021-09-02 PROCEDURE — 87205 SMEAR GRAM STAIN: CPT | Performed by: FAMILY MEDICINE

## 2021-09-02 PROCEDURE — 87077 CULTURE AEROBIC IDENTIFY: CPT | Performed by: FAMILY MEDICINE

## 2021-09-02 PROCEDURE — 87186 SC STD MICRODIL/AGAR DIL: CPT | Performed by: FAMILY MEDICINE

## 2021-09-02 PROCEDURE — 99214 OFFICE O/P EST MOD 30 MIN: CPT | Performed by: FAMILY MEDICINE

## 2021-09-02 PROCEDURE — 87070 CULTURE OTHR SPECIMN AEROBIC: CPT | Performed by: FAMILY MEDICINE

## 2021-09-02 RX ORDER — LIDOCAINE HYDROCHLORIDE 40 MG/ML
5 SOLUTION TOPICAL ONCE
Status: COMPLETED | OUTPATIENT
Start: 2021-09-02 | End: 2021-09-02

## 2021-09-02 RX ADMIN — LIDOCAINE HYDROCHLORIDE 5 ML: 40 SOLUTION TOPICAL at 11:20

## 2021-09-02 NOTE — PROGRESS NOTES
Patient ID: Nita Roberson is a 62 y o  male Date of Birth 1960       Chief Complaint   Patient presents with    Follow Up Wound Care Visit     Patient here for follow up for wounds to sacral and buttock areas       Allergies:  Patient has no known allergies  Diagnosis:   Diagnosis ICD-10-CM Associated Orders   1  Stage IV pressure ulcer of sacral region ([facility])  L89 154 lidocaine (XYLOCAINE) 4 % topical solution 5 mL     Wound cleansing and dressings   2  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 ([facility])  L89 223 lidocaine (XYLOCAINE) 4 % topical solution 5 mL     Wound cleansing and dressings   3  Pressure ulcer of right lower back, stage 3 ([facility])  L89 133 lidocaine (XYLOCAINE) 4 % topical solution 5 mL     Wound cleansing and dressings     Debridement   4  Paraplegia ([address] )  G82 20          Assessment :   Plastic surgery was cancelled because of pressure injury of the right lateral back  The back pressure injury is deeper with tunneling  Other pressure injuries of the sacrum and hip were unchanged  No signs of infection  Plan:   Right lateral back was surgically debrided in culture obtained post debridement  Will use Mesalt packing  Continue with Dakin's packing to other ulcers  Continued offload  Followup in two weeks  Subjective:   8/26/20: Patient returns to the wound center for further evaluation of his stage IV pressure ulcer of the sacrum and stage III pressure ulcer of his hip  He is palliative care  Patient is paraplegic  Current treatment includes saline dressings  Dermagran to the hip ulcer  He has all the offloading devices  He denies any fever, chills or cough  Nothing else is change for him  His pain is unchanged  Is neuropathic     10/1/20: Followup stage IV pressure ulcer of the sacrum and stage III of the left hip  Nothing much is changed  He has no fever, pain, chills  Continues with saline packing to the stage IV and Dermagran to the stage III  No cough        11/5/20: Followup stage IV pressure ulcer of the sacrum and stage III pressure ulcer of the left hip  Stage IV pressure ulcer of the left buttock also  Essentially, nothing is changed  He still has not made an appointment with plastic surgery and wants to wait tail after the 1st of the year  Current wound care includes saline gauze packing and Dermagran to the hip  He denies any fever, chills or cough  1/21/21: Followup stage IV pressure ulcer of the sacrum/left buttock  Also stage III of the left hip  Patient states that he gave up his air mattress and now is in a regular bed  He feels that he can offload just as well  Did not want to use the air mattress any longer  He states that he needs a referral to plastic surgery  Saline packing is being used to the large ulcer and Dermagran to the left hip  He denies any fever, chills or cough  3/3/21: Followup stage IV pressure ulcer of the left buttock and stage III of the left hip  No changes  He has appointment plastic surgery next week  No longer has any specialty mattress as per his choice  He states nothing else is new  Denies any fever, chills or cough  4/8/21:  Followup stage IV pressure ulcer of the left buttock and stage III of the left hip  He offers no complaints and denies any pain  He has an appointment with plastic surgery next month  For some reason did not make his appointment last month  Denies any fever, chills or cough  6/9/21: Followup stage IV pressure injury of the left buttock and stage III of the left hip  No new complaints  He does have plastic surgery scheduled for August   No fever or chills  Nothing else has changed  7/29/21: Followup stage IV pressure injury of the left ischium and stage III of the left hip  Patient states that he now has a new pressure injury of the right lower back/flank that he has noticed for about two weeks  He believes that it may been something in his wheelchair causing pressure    He removed but he thought was causing the problem  Saline packing has been used by nursing  No other new complaints other than the new ulcer  No fever or chills  9/2/21: Followup stage IV pressure injury of the left ischium and stage III of the left hip  At last visit, the patient had a new pressure injury, stage III of the right lateral back  His plastic surgery for flap closure was cancelled because of the right back pressure injury  The patient has no other new complaints  Denies any fever or chills          The following portions of the patient's history were reviewed and updated as appropriate:   Patient Active Problem List   Diagnosis    Stage IV pressure ulcer of sacral region ([address] )    Stage IV pressure ulcer of left heel ([address] )    Cyst of joint of shoulder    Anemia    Paraplegic spinal paralysis ([address] )    Sinus tachycardia    GERD (gastroesophageal reflux disease)    History of osteomyelitis    Anxiety    Amputated right leg (HCC)    Benign essential hypertension    Diabetes mellitus type II, controlled ([address] )    Diarrhea    Decubitus ulcer of ischium, left, stage IV (HCC)    Chronic, continuous use of opioids    Colostomy in place [facility])    Colon cancer screening    Dyspepsia    Epigastric pain    Osteomyelitis of pelvic region ([address] )    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[address] )    Neurogenic bladder    Sepsis with hypotension ([address] )    History of Clostridium difficile colitis    Hyperkalemia    Stage II pressure ulcer of buttock ([address] )    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, stage 3 ([address] )    SBO (small bowel obstruction) ([address] )    Sepsis ([address] )    Hypokalemia    Renal cyst    Other male erectile dysfunction    Prostate cancer screening     Past Medical History:   Diagnosis Date    Anemia     Blind     r eye    Chronic cystitis     Colostomy in place [facility])     Detrusor sphincter dyssynergia     Diabetes mellitus ([address] )     Poorly controlled type 2; Last Assessed:  3/18/14  Erectile dysfunction     Frequency of urination     GERD (gastroesophageal reflux disease)     History of diabetes mellitus     History of osteomyelitis     Hx of leg amputation (HCC)     r high upper leg    Hyperlipidemia     Hypertension     Hypospadias     Incomplete bladder emptying     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 ) 9/16/2019    Neurogenic bladder     Paralysis ([facility] )     Paraplegia (HCC)     Spinal cord cysts     Ulcer of sacral region [facility])     Urge incontinence      Past Surgical History:   Procedure Laterality Date    AMPUTATION      At hip; Last Assessed:  1/19/16    BLADDER SURGERY      COLON SURGERY      llq ostomy pouch    COLOSTOMY      COMPLEX CYSTOMETROGRAM  2014    CT CYSTOGRAM  9/19/2019    CYSTOSCOPY  2014    IR PICC REPOSITION  9/23/2019    IR SUPRAPUBIC CATHETER PLACEMENT  9/19/2019    LEG AMPUTATION      MEATOTOMY      PENILE PROSTHESIS  REMOVAL N/A 11/1/2019    Procedure: REMOVAL PROSTHESIS PENILE;  Surgeon: Aida Harley MD;  Location: [facility];  Service: Urology    PENILE PROSTHESIS IMPLANT  2011    NV ADJ TISS XFER SCALP,EXTREM 10 1-30 SQCM Left 5/1/2017    Procedure: POSTERIOR THIGH V-Y ADMANCEMENT;  Surgeon: Don Maddox MD;  [facility];  Service: Plastics    NV MUSCLE-SKIN FLAP,TRUNK Left 5/1/2017    Procedure: FLAP CLOSURE LEFT ISCHIAL WOUND and "RIGHT" ISCHIAL FLAP ADVANCEMENT * Vear Matt ;  Surgeon: Don Maddox MD;  [facility];  Service: Plastics    NV MUSCLE-SKIN FLAP,TRUNK Left 9/27/2017    Procedure: gluteal myocutaneous rotational flap, posterior thigh v to y advancement- wound 5 x 2 5 x 8;  Surgeon: Don Maddox MD;  [facility];  Service: Plastics    SPINE SURGERY      Lower back    UROFLOWMETRY SIMPLE / COMPLEX  2014     Family History   Problem Relation Age of Onset    No Known Problems Mother     No Known Problems Father      Social History     Socioeconomic History    Marital status: /Civil [employer]     Spouse name: None    Number of children: None    Years of education: None    Highest education level: None   Occupational History    None   Tobacco Use    Smoking status: Former Smoker     Packs/day: 0 50     Years: 10 00     Pack years: 5 00     Quit date:      Years since quittin 6    Smokeless tobacco: Never Used    Tobacco comment: Onset date:  11/10/17   Vaping Use    Vaping Use: Never used   Substance and Sexual Activity    Alcohol use: Yes     Comment: Per [employer]:  Social drinker (Onset date:  11/10/17)    Drug use: No    Sexual activity: None   Other Topics Concern    None   Social History Narrative    [place] language [address] [place] [address] Dr Ontiveros    Social history reviewed, unchanged     Social Determinants of Health     Financial Resource Strain:     Difficulty of Paying Living Expenses:    Food Insecurity:     Worried About Running Out of Food in the Last Year:     [address] in the Last Year:    Transportation Needs:     Lack of Transportation (Medical):      Lack of Transportation (Non-Medical):    Physical Activity:     Days of Exercise per Week:     Minutes of Exercise per Session:    Stress:     Feeling of Stress :    Social Connections:     Frequency of Communication with Friends and Family:     Frequency of Social Gatherings with Friends and Family:     Attends Mandaen Services:     Active Member of Clubs or Organizations:     Attends Club or Organization Meetings:     Marital Status:    Intimate Partner Violence:     Fear of Current or Ex-Partner:     Emotionally Abused:     Physically Abused:     Sexually Abused:        Current Outpatient Medications:     Accu-Chek FastClix Lancets MISC, Inject under the skin daily, Disp: 100 each, Rfl: 0    acetaminophen (TYLENOL) 325 mg tablet, Take 2 tablets (650 mg total) by mouth every 6 (six) hours as needed for mild pain, headaches or fever, Disp: 30 tablet, Rfl: 0    ASPIRIN LOW DOSE 81 MG EC tablet, TAKE 1 TABLET BY MOUTH EVERY DAY (Patient not taking: Reported on 7/22/2020), Disp: 30 tablet, Rfl: 0    Blood Glucose Monitoring Suppl (ONE TOUCH ULTRA 2) w/Device KIT, by Does not apply route daily, Disp: 100 each, Rfl: 3    Disposable Gloves (LATEX GLOVES LARGE) MISC, Use as needed up to 3 times a day dispo 2 boxes, Disp: 2 each, Rfl: 11    glucose blood (Accu-Chek Guide) test strip, Use 1 each daily Use as instructed, Disp: 50 each, Rfl: 0    ibuprofen (MOTRIN) 800 mg tablet, take 1 tablet by mouth every 8 hours if needed for MODERATE PAIN ( PAIN SCALE 4-6 ), Disp: 60 tablet, Rfl: 0    Incontinence Supply Disposable (SUNBEAM INCONTINENT UNDER PAD) MISC, Use 1 per week, dispense 4 reusable underpads, Disp: 4 each, Rfl: 11    Incontinence Supply Disposable MISC, by Does not apply route 2 (two) times a day, Disp: 60 each, Rfl: 11    naloxone (NARCAN) 4 mg/0 1 mL nasal spray, Administer 1 spray into a nostril  If no response after 2-3 minutes, give another dose in the other nostril using a new spray  (Patient not taking: Reported on 2/23/2021), Disp: 1 each, Rfl: 1    Nutritional Supplements (GLUCERNA SHAKE) LIQD, Take 3 Cans by mouth 3 (three) times a day, Disp: 90 Can, Rfl: 11    omeprazole (PriLOSEC) 20 mg delayed release capsule, Take 1 capsule (20 mg total) by mouth daily (Patient not taking: Reported on 2/26/2020), Disp: 90 capsule, Rfl: 0    omeprazole (PriLOSEC) 40 MG capsule, take 1 capsule by mouth once daily, Disp: 90 capsule, Rfl: 0    oxyCODONE (ROXICODONE) 20 MG TABS, Take 1 tablet (20 mg total) by mouth 3 (three) times a day as needed for moderate painMax Daily Amount: 60 mg, Disp: 90 tablet, Rfl: 0    sodium hypochlorite (DAKIN'S HALF-STRENGTH) external solution, Soak gauze and pack into wounds with each dressing change as directed , Disp: 473 mL, Rfl: 1  No current facility-administered medications for this visit      Review of Systems   Constitutional: Negative for activity change, appetite change, chills, fatigue and fever  HENT: Negative for congestion, hearing loss and postnasal drip  Eyes: Negative for visual disturbance  Respiratory: Negative for cough and shortness of breath  Cardiovascular: Negative for chest pain and leg swelling  Gastrointestinal: Negative for abdominal pain, constipation, diarrhea and nausea  Endocrine: Negative  Genitourinary: Negative for dysuria and urgency  Musculoskeletal: Positive for gait problem (Nonambulatory)  Skin: Positive for wound (Left hip and sacrum and ulcer right flank)  Negative for rash  Neurological: Positive for weakness (Paraplegic)  Paraplegia and neuropathy   Hematological: Does not bruise/bleed easily  Psychiatric/Behavioral: Negative  Objective:  /78   Pulse 72   Temp (!) 97 1 °F (36 2 °C)   Resp 18   Pain Score:   8     Physical Exam  Vitals and nursing note reviewed  Constitutional:       Appearance: Normal appearance  He is well-developed and normal weight  HENT:      Head: Normocephalic and atraumatic  Skin:     General: Skin is warm and dry  Findings: Wound present  Comments: Left buttock ulcer has clean, granulation tissue  No significant change  Tunnel is unchanged no probe to bone  Left hip ulcer with granulation tissue in the base     No surrounding erythema  Tunneling  No malodor  Ulceration on the right flank/lower back appears to tunnel more than at last visit  Mostly granulation tissue  No malodor  No erythema  No probe to bone  Neurological:      Mental Status: He is alert and oriented to person, place, and time  Psychiatric:         Attention and Perception: Attention normal          Mood and Affect: Mood and affect normal          Behavior: Behavior is cooperative  Cognition and Memory: Cognition normal               Debridement   Wound 07/29/21 Pressure Injury Back Right; Lower; Lateral    Universal Protocol:  Consent: Verbal consent obtained  Written consent obtained  Consent given by: patient  Time out: Immediately prior to procedure a "time out" was called to verify the correct patient, procedure, equipment, support staff and site/side marked as required  Patient understanding: patient states understanding of the procedure being performed  Patient identity confirmed: verbally with patient      Performed by: physician  Debridement type: surgical  Level of debridement: subcutaneous tissue  Pain control: lidocaine 4%  Post-debridement measurements  Length (cm): 2 1  Width (cm): 2 1  Depth (cm): 4 5  Percent debrided: 100%  Surface Area (cm^2): 4 41  Area debrided (cm^2): 4 41  Volume (cm^3): 19 85  Tissue and other material debrided: dermis and subcutaneous tissue  Devitalized tissue debrided: fibrin  Instrument(s) utilized: curette  Bleeding: medium  Hemostasis obtained with: pressure  Procedural pain (0-10): 0  Post-procedural pain: 0   Response to treatment: procedure was tolerated well  Debridement Comments: Post debridement, saline was used for flashing followed by culture being obtained  Wound 08/26/20 Buttocks Left (Active)   Wound Image   09/02/21 1056   Wound Description Epithelialization;Granulation tissue;Slough 09/02/21 1056   Pressure Injury Stage 4 09/02/21 1056   Shelby-wound Assessment Scar Tissue; Maceration 09/02/21 1056   Wound Length (cm) 4 cm 09/02/21 1056   Wound Width (cm) 1 5 cm 09/02/21 1056   Wound Depth (cm) 1 5 cm 09/02/21 1056   Wound Surface Area (cm^2) 6 cm^2 09/02/21 1056   Wound Volume (cm^3) 9 cm^3 09/02/21 1056   Calculated Wound Volume (cm^3) 9 cm^3 09/02/21 1056   Change in Wound Size % 68 75 09/02/21 1056   Tunneling 5 cm 05/10/21 0939   Tunneling in depth located at 9 oclock 5 6cm,  6 oclock 4cm 09/02/21 1056   Undermining 5 6 07/29/21 1103   Undermining is depth extending from 9-12 and 3-6 07/29/21 1103   Drainage Amount Moderate 09/02/21 1056   Drainage Description Serous 09/02/21 1056   Non-staged Wound Description Full thickness 09/02/21 1056   Treatments Cleansed 06/09/21 1031   Wound packed? Yes 01/21/21 1050   Packing- # removed 1 11/05/20 1036   Dressing Changed Changed 06/09/21 1031   Patient Tolerance Tolerated well 06/09/21 1031   Dressing Status Removed 06/09/21 1031       Wound 08/26/20 Hip Left (Active)   Wound Image   09/02/21 1059   Wound Description Granulation tissue;Slough; Epithelialization 09/02/21 1059   Pressure Injury Stage 3 09/02/21 1059   Shelby-wound Assessment Scar Tissue; Intact 09/02/21 1059   Wound Length (cm) 2 5 cm 09/02/21 1059   Wound Width (cm) 3 5 cm 09/02/21 1059   Wound Depth (cm) 0 2 cm 09/02/21 1059   Wound Surface Area (cm^2) 8 75 cm^2 09/02/21 1059   Wound Volume (cm^3) 1 75 cm^3 09/02/21 1059   Calculated Wound Volume (cm^3) 1 75 cm^3 09/02/21 1059   Change in Wound Size % -660 87 09/02/21 1059   Undermining 0 3 05/10/21 0937   Undermining is depth extending from 5-8 05/10/21 0937   Drainage Amount Moderate 09/02/21 1059   Drainage Description Serosanguineous 09/02/21 1059   Non-staged Wound Description Full thickness 09/02/21 1059   Treatments Cleansed 04/08/21 0959   Dressing Changed Changed 04/08/21 0959   Patient Tolerance Tolerated well 01/21/21 1052   Dressing Status Intact 05/10/21 0937       Wound 07/29/21 Pressure Injury Back Right; Lower; Lateral (Active)   Wound Image   09/02/21 1053   Wound Description Epithelialization;Granulation tissue;Slough 09/02/21 1100   Pressure Injury Stage 3 09/02/21 1100   Shelby-wound Assessment Intact 09/02/21 1100   Wound Length (cm) 2 1 cm 09/02/21 1100   Wound Width (cm) 2 1 cm 09/02/21 1100   Wound Depth (cm) 2 1 cm 09/02/21 1100   Wound Surface Area (cm^2) 4 41 cm^2 09/02/21 1100   Wound Volume (cm^3) 9 261 cm^3 09/02/21 1100   Calculated Wound Volume (cm^3) 9 26 cm^3 09/02/21 1100   Change in Wound Size % 46 66 09/02/21 1100   Tunneling 4 5 cm 09/02/21 1100   Tunneling in depth located at 12 oclock 09/02/21 1100   Drainage Amount Moderate 09/02/21 1100   Drainage Description Serous 09/02/21 1100   Non-staged Wound Description Full thickness 09/02/21 1100                               Wound Instructions:  Orders Placed This Encounter   Procedures    Wound cleansing and dressings     Wound cleansing and dressings          Left buttock:  Cleanse/Irrigate wound with Prophase today in wound center and then resume Normal Saline with next dressing change  Please ensure the tunnel at 11 oclock and Undermining at 3-12 oclock are  irrigated well with NSS  Cleanse gaby-wound skin with pH balanced soap and water  Apply skin prep to periwound  Apply primary dressing: Loosely pack with dakins moistened gauze   Apply secondary dressing: - 4x4s then ABD pad  Secure with: - medfix tape  Change dressing every day and as needed if soiled      Wound Left Hip  Cleanse/Irrigate wound with prophase today in wound center only and resume Normal Saline with next dressing change  Apply skin prep to periwound   Apply primary dressing: -hydrocolloid  Cover with ABD from above dressing  Secure with: - medfix tape  Change dressing 2 x per week and as needed if soiled or displaced       R lateral back:  Cleanse today with Prophase in wound center only  Get pack wound with mesalt and tape tail to skin  Cover with gauze, 1/2 ABD  Secure with medfix or other hypoallergenic tape  Change dressing every other day     HOME CARE  Continue home care services/skilled nursing - 3 x per week (family to do in between)     OFF-LOADING  Avoid pressure at wound site  - all wounds, including right back  Wheelchair Cushion  - ROHO cushion  Do Not Sit for Long Periods of Time   - 1 hr max for meals only, otherwise in bed and turn side to side at least  every 3 hours or more frequently  Reposition in regular bed for now at least every 1-2 hours while awake      Make sure you get your blood work this week   Please notify plastic surgery office that you have a new wound on your back     Follow up with Dr Leonardo Calderon in 4 weeks unless being followed by plastic surgery     Today's Wound treatment note: All wounds cleansed with prophase  Redressed as ordered above     Standing Status:   Future     Standing Expiration Date:   9/2/2022    Debridement     This order was created via procedure documentation       Ebonie Pereira MD, CHT, CWS       Portions of the record may have been created with voice recognition software  Occasional wrong word or "sound alike" substitutions may have occurred due to the inherent limitations of voice recognition software  Read the chart carefully and recognize, using context, where substitutions have occurred

## 2021-09-02 NOTE — PATIENT INSTRUCTIONS
Orders Placed This Encounter   Procedures    Wound cleansing and dressings     Wound cleansing and dressings          Left buttock:  Cleanse/Irrigate wound with Prophase today in wound center and then resume Normal Saline with next dressing change  Please ensure the tunnel at 11 oclock and Undermining at 3-12 oclock are  irrigated well with NSS  Cleanse gaby-wound skin with pH balanced soap and water  Apply skin prep to periwound  Apply primary dressing: Loosely pack with dakins moistened gauze   Apply secondary dressing: - 4x4s then ABD pad  Secure with: - medfix tape  Change dressing every day and as needed if soiled      Wound Left Hip  Cleanse/Irrigate wound with prophase today in wound center only and resume Normal Saline with next dressing change  Apply skin prep to periwound   Apply primary dressing: -hydrocolloid  Cover with ABD from above dressing  Secure with: - medfix tape  Change dressing 2 x per week and as needed if soiled or displaced       R lateral back:  Cleanse today with Prophase in wound center only  Loosley pack wound with mesalt and tape tail to skin  Cover with gauze, 1/2 ABD  Secure with medfix or other hypoallergenic tape  Change dressing every other day     HOME CARE  Continue home care services/skilled nursing - 3 x per week (family to do in between)     OFF-LOADING  Avoid pressure at wound site  - all wounds, including right back  Wheelchair Cushion  - ROHO cushion  Do Not Sit for Long Periods of Time  - 1 hr max for meals only, otherwise in bed and turn side to side at least  every 3 hours or more frequently  Reposition in regular bed for now at least every 1-2 hours while awake      Make sure you get your blood work this week   Please notify plastic surgery office that you have a new wound on your back     Follow up with Dr Day Doing in 4 weeks unless being followed by plastic surgery     Today's Wound treatment note:   All wounds cleansed with prophase  Redressed as ordered above Standing Status:   Future     Standing Expiration Date:   9/2/2022

## 2021-09-05 LAB
BACTERIA WND AEROBE CULT: ABNORMAL
BACTERIA WND AEROBE CULT: ABNORMAL
GRAM STN SPEC: ABNORMAL
GRAM STN SPEC: ABNORMAL

## 2021-09-07 ENCOUNTER — TELEPHONE (OUTPATIENT)
Dept: WOUND CARE | Facility: HOSPITAL | Age: 61
End: 2021-09-07

## 2021-09-07 RX ORDER — SULFAMETHOXAZOLE AND TRIMETHOPRIM 800; 160 MG/1; MG/1
1 TABLET ORAL EVERY 12 HOURS SCHEDULED
Qty: 20 TABLET | Refills: 0 | Status: SHIPPED | OUTPATIENT
Start: 2021-09-07 | End: 2021-09-17

## 2021-09-07 NOTE — TELEPHONE ENCOUNTER
Call placed to patient to let him know that his culture results were in and that MD sent an antibiotic to pharmacy for him to take  Patient reports that he will pick it up later today

## 2021-09-29 ENCOUNTER — OFFICE VISIT (OUTPATIENT)
Dept: FAMILY MEDICINE CLINIC | Facility: CLINIC | Age: 61
End: 2021-09-29

## 2021-09-29 VITALS
RESPIRATION RATE: 18 BRPM | TEMPERATURE: 97.4 F | SYSTOLIC BLOOD PRESSURE: 104 MMHG | OXYGEN SATURATION: 95 % | DIASTOLIC BLOOD PRESSURE: 78 MMHG | HEART RATE: 55 BPM

## 2021-09-29 DIAGNOSIS — G89.29 CHRONIC LOW BACK PAIN WITHOUT SCIATICA, UNSPECIFIED BACK PAIN LATERALITY: ICD-10-CM

## 2021-09-29 DIAGNOSIS — E11.9 TYPE 2 DIABETES MELLITUS WITHOUT COMPLICATION, WITHOUT LONG-TERM CURRENT USE OF INSULIN (HCC): Primary | ICD-10-CM

## 2021-09-29 DIAGNOSIS — L89.324 STAGE IV PRESSURE ULCER OF LEFT BUTTOCK (HCC): ICD-10-CM

## 2021-09-29 DIAGNOSIS — Z93.3 COLOSTOMY IN PLACE (HCC): ICD-10-CM

## 2021-09-29 DIAGNOSIS — M54.50 CHRONIC LOW BACK PAIN WITHOUT SCIATICA, UNSPECIFIED BACK PAIN LATERALITY: ICD-10-CM

## 2021-09-29 PROBLEM — E11.22 TYPE 2 DIABETES MELLITUS WITH CHRONIC KIDNEY DISEASE, WITHOUT LONG-TERM CURRENT USE OF INSULIN, UNSPECIFIED CKD STAGE (HCC): Status: ACTIVE | Noted: 2021-09-29

## 2021-09-29 LAB — SL AMB POCT HEMOGLOBIN AIC: 5.2 (ref ?–6.5)

## 2021-09-29 PROCEDURE — 3078F DIAST BP <80 MM HG: CPT | Performed by: FAMILY MEDICINE

## 2021-09-29 PROCEDURE — 99213 OFFICE O/P EST LOW 20 MIN: CPT | Performed by: FAMILY MEDICINE

## 2021-09-29 PROCEDURE — 83036 HEMOGLOBIN GLYCOSYLATED A1C: CPT | Performed by: FAMILY MEDICINE

## 2021-09-29 PROCEDURE — 3074F SYST BP LT 130 MM HG: CPT | Performed by: FAMILY MEDICINE

## 2021-09-29 RX ORDER — BLOOD SUGAR DIAGNOSTIC
1 STRIP MISCELLANEOUS DAILY
Qty: 100 EACH | Refills: 6 | Status: SHIPPED | OUTPATIENT
Start: 2021-09-29 | End: 2021-10-27 | Stop reason: SDUPTHER

## 2021-09-29 RX ORDER — OXYCODONE HYDROCHLORIDE 20 MG/1
20 TABLET ORAL 3 TIMES DAILY PRN
Qty: 90 TABLET | Refills: 0 | Status: SHIPPED | OUTPATIENT
Start: 2021-09-29 | End: 2021-10-27 | Stop reason: SDUPTHER

## 2021-09-29 NOTE — PROGRESS NOTES
Assessment/Plan:    No problem-specific Assessment & Plan notes found for this encounter  Diagnoses and all orders for this visit:    Type 2 diabetes mellitus without complication, without long-term current use of insulin ([facility])  -     POCT hemoglobin A1c  -     glucose blood (Accu-Chek Guide) test strip; Use 1 each daily Use as instructed    Chronic low back pain without sciatica, unspecified back pain laterality  -     oxyCODONE (ROXICODONE) 20 MG TABS; Take 1 tablet (20 mg total) by mouth 3 (three) times a day as needed for moderate painMax Daily Amount: 60 mg    Stage IV pressure ulcer of left buttock (HCC)  -     oxyCODONE (ROXICODONE) 20 MG TABS; Take 1 tablet (20 mg total) by mouth 3 (three) times a day as needed for moderate painMax Daily Amount: 60 mg    Colostomy in place [facility])          Subjective:      Patient ID: Leandro Jain is a 62 y o  male  63 yo  male with multiple medical comorbidities including paraplegic spinal paralysis with R leg disarticulation at the hip, left ischium wound which required skin flap  He had subsequently undergone penile prosthesis by Dr Gorge Aggarwal  He underwent explantation of the prosthesis 11/4/2019 due to infection and erosion  Also has Neurogenic bladder due to spinal cord injury and paraplegia with history of bladder augmentation and appendical vesicostomy  He is now managed with an indwelling suprapubic tube change monthly and he is doing well with this  No recent infections  Doing well today  Patient concerned because insurance will not cover Glucerna          The following portions of the patient's history were reviewed and updated as appropriate:   He  has a past medical history of Anemia, Blind, Chronic cystitis, Colostomy in place [facility]), Detrusor sphincter dyssynergia, Diabetes mellitus ([address] ), Erectile dysfunction, Frequency of urination, GERD (gastroesophageal reflux disease), History of diabetes mellitus, History of osteomyelitis, leg amputation ([facility] [address] ), Hyperlipidemia, Hypertension, Hypospadias, Incomplete bladder emptying, 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([facility] [address] ) (9/16/2019), Neurogenic bladder, Paralysis ([address] ), Paraplegia ([address] ), Spinal cord cysts, Ulcer of sacral region [facility]), and Urge incontinence  He   Patient Active Problem List    Diagnosis Date Noted    Type 2 diabetes mellitus without complication, without long-term current use of insulin ([address] ) 09/29/2021    Renal cyst 11/18/2019    Other male erectile dysfunction 11/18/2019    Prostate cancer screening 11/18/2019    Hypokalemia 10/29/2019    SBO (small bowel obstruction) ([address] ) 10/28/2019    Sepsis ([facility] [address] ) 10/28/2019    Stage II pressure ulcer of buttock ([address] ) 09/17/2019    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, stage 3 ([address] ) 09/16/2019    Hyperkalemia 09/14/2019    History of Clostridium difficile colitis 08/25/2019    Neurogenic bladder 08/23/2019    Sepsis with hypotension ([address] ) 08/23/2019    Osteomyelitis of pelvic region ([facility] [address] ) 05/06/2019    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    Colostomy in place [facility]) 04/23/2019    Colon cancer screening 04/23/2019    Dyspepsia 04/23/2019    Epigastric pain 04/23/2019    Chronic, continuous use of opioids 01/15/2019    Decubitus ulcer of ischium, left, stage IV ([address] ) 11/12/2018    Diarrhea 09/21/2018    Amputated right leg ([address] ) 07/18/2018    Anxiety 04/23/2018    GERD (gastroesophageal reflux disease)     History of osteomyelitis     Cyst of joint of shoulder 10/20/2016    Anemia 10/20/2016    Paraplegic spinal paralysis ([address] ) 10/20/2016    Sinus tachycardia 10/20/2016    Stage IV pressure ulcer of sacral region ([address] ) 10/15/2016    Stage IV pressure ulcer of left heel ([address] ) 10/15/2016    Diabetes mellitus type II, controlled ([address] ) 03/07/2014    Benign essential hypertension 07/31/2013     He  has a past surgical history that includes Spine surgery; Leg amputation; Bladder surgery; Colostomy;  Amputation; pr adj tiss xfer scalp,extrem 10 1-30 sqcm (Left, 5/1/2017); pr muscle-skin flap,trunk (Left, 5/1/2017); Colon surgery; pr muscle-skin flap,trunk (Left, 9/27/2017); Complex cystometrogram (2014); Uroflowmetry simple / complex (2014); Cystoscopy (2014); Penile prosthesis implant (2011); Meatotomy; CT cystogram (9/19/2019); IR suprapubic catheter placement (9/19/2019); IR PICC reposition (9/23/2019); and Penile prosthesis removal (N/A, 11/1/2019)  His family history includes No Known Problems in his father and mother  He  reports that he quit smoking about 34 years ago  He has a 5 00 pack-year smoking history  He has never used smokeless tobacco  He reports current alcohol use  He reports that he does not use drugs    Current Outpatient Medications   Medication Sig Dispense Refill    Accu-Chek FastClix Lancets MISC Inject under the skin daily 100 each 0    acetaminophen (TYLENOL) 325 mg tablet Take 2 tablets (650 mg total) by mouth every 6 (six) hours as needed for mild pain, headaches or fever 30 tablet 0    Disposable Gloves (LATEX GLOVES LARGE) MISC Use as needed up to 3 times a day dispo 2 boxes 2 each 11    glucose blood (Accu-Chek Guide) test strip Use 1 each daily Use as instructed 100 each 6    ibuprofen (MOTRIN) 800 mg tablet take 1 tablet by mouth every 8 hours if needed for MODERATE PAIN ( PAIN SCALE 4-6 ) 60 tablet 0    Incontinence Supply Disposable (SUNBEAM INCONTINENT UNDER PAD) MISC Use 1 per week, dispense 4 reusable underpads 4 each 11    Incontinence Supply Disposable MISC by Does not apply route 2 (two) times a day 60 each 11    Nutritional Supplements (GLUCERNA SHAKE) LIQD Take 3 Cans by mouth 3 (three) times a day 90 Can 11    omeprazole (PriLOSEC) 40 MG capsule take 1 capsule by mouth once daily 90 capsule 0    oxyCODONE (ROXICODONE) 20 MG TABS Take 1 tablet (20 mg total) by mouth 3 (three) times a day as needed for moderate painMax Daily Amount: 60 mg 90 tablet 0    sodium hypochlorite (DAKIN'S HALF-STRENGTH) external solution Soak gauze and pack into wounds with each dressing change as directed  473 mL 1    ASPIRIN LOW DOSE 81 MG EC tablet TAKE 1 TABLET BY MOUTH EVERY DAY (Patient not taking: Reported on 7/22/2020) 30 tablet 0    Blood Glucose Monitoring Suppl (ONE TOUCH ULTRA 2) w/Device KIT by Does not apply route daily 100 each 3    naloxone (NARCAN) 4 mg/0 1 mL nasal spray Administer 1 spray into a nostril  If no response after 2-3 minutes, give another dose in the other nostril using a new spray  (Patient not taking: Reported on 2/23/2021) 1 each 1    omeprazole (PriLOSEC) 20 mg delayed release capsule Take 1 capsule (20 mg total) by mouth daily (Patient not taking: Reported on 2/26/2020) 90 capsule 0     No current facility-administered medications for this visit  Current Outpatient Medications on File Prior to Visit   Medication Sig    Accu-Chek FastClix Lancets MISC Inject under the skin daily    acetaminophen (TYLENOL) 325 mg tablet Take 2 tablets (650 mg total) by mouth every 6 (six) hours as needed for mild pain, headaches or fever    Disposable Gloves (LATEX GLOVES LARGE) MISC Use as needed up to 3 times a day dispo 2 boxes    ibuprofen (MOTRIN) 800 mg tablet take 1 tablet by mouth every 8 hours if needed for MODERATE PAIN ( PAIN SCALE 4-6 )    Incontinence Supply Disposable (SUNBEAM INCONTINENT UNDER PAD) MISC Use 1 per week, dispense 4 reusable underpads    Incontinence Supply Disposable MISC by Does not apply route 2 (two) times a day    Nutritional Supplements (GLUCERNA SHAKE) LIQD Take 3 Cans by mouth 3 (three) times a day    omeprazole (PriLOSEC) 40 MG capsule take 1 capsule by mouth once daily    sodium hypochlorite (DAKIN'S HALF-STRENGTH) external solution Soak gauze and pack into wounds with each dressing change as directed      [DISCONTINUED] glucose blood (Accu-Chek Guide) test strip Use 1 each daily Use as instructed    [DISCONTINUED] oxyCODONE (ROXICODONE) 20 MG TABS Take 1 tablet (20 mg total) by mouth 3 (three) times a day as needed for moderate painMax Daily Amount: 60 mg    ASPIRIN LOW DOSE 81 MG EC tablet TAKE 1 TABLET BY MOUTH EVERY DAY (Patient not taking: Reported on 7/22/2020)    Blood Glucose Monitoring Suppl (ONE TOUCH ULTRA 2) w/Device KIT by Does not apply route daily    naloxone (NARCAN) 4 mg/0 1 mL nasal spray Administer 1 spray into a nostril  If no response after 2-3 minutes, give another dose in the other nostril using a new spray  (Patient not taking: Reported on 2/23/2021)    omeprazole (PriLOSEC) 20 mg delayed release capsule Take 1 capsule (20 mg total) by mouth daily (Patient not taking: Reported on 2/26/2020)     No current facility-administered medications on file prior to visit       Review of Systems   Musculoskeletal: Positive for arthralgias and back pain  Negative for gait problem (uses elctric wheelchair given spinal paralysis )  All other systems reviewed and are negative  Objective:      /78 (BP Location: Left arm, Patient Position: Sitting, Cuff Size: Standard)   Pulse 55   Temp (!) 97 4 °F (36 3 °C) (Temporal)   Resp 18   SpO2 95%          Physical Exam  Vitals and nursing note reviewed  Constitutional:       Appearance: Normal appearance  HENT:      Head: Normocephalic and atraumatic  Right Ear: Tympanic membrane, ear canal and external ear normal       Left Ear: Tympanic membrane, ear canal and external ear normal       Nose: Nose normal       Mouth/Throat:      Mouth: Mucous membranes are dry  Pharynx: Oropharynx is clear  Cardiovascular:      Rate and Rhythm: Normal rate and regular rhythm  Heart sounds: Normal heart sounds  Pulmonary:      Effort: Pulmonary effort is normal       Breath sounds: Normal breath sounds  Abdominal:      General: Bowel sounds are normal       Comments: Colostomy in place   Skin:     General: Skin is warm     Neurological:      Mental Status: He is alert  Mental status is at baseline

## 2021-10-07 ENCOUNTER — OFFICE VISIT (OUTPATIENT)
Dept: WOUND CARE | Facility: HOSPITAL | Age: 61
End: 2021-10-07
Payer: MEDICARE

## 2021-10-07 VITALS
TEMPERATURE: 96.8 F | RESPIRATION RATE: 18 BRPM | HEART RATE: 72 BPM | DIASTOLIC BLOOD PRESSURE: 82 MMHG | SYSTOLIC BLOOD PRESSURE: 128 MMHG

## 2021-10-07 DIAGNOSIS — L89.154 STAGE IV PRESSURE ULCER OF SACRAL REGION (HCC): Primary | ICD-10-CM

## 2021-10-07 DIAGNOSIS — L89.133 PRESSURE ULCER OF RIGHT LOWER BACK, STAGE 3 (HCC): ICD-10-CM

## 2021-10-07 DIAGNOSIS — L89.223 STAGE III PRESSURE ULCER OF LEFT HIP (HCC): ICD-10-CM

## 2021-10-07 DIAGNOSIS — A49.02 INFECTION OF WOUND DUE TO METHICILLIN RESISTANT STAPHYLOCOCCUS AUREUS (MRSA): ICD-10-CM

## 2021-10-07 PROCEDURE — 99214 OFFICE O/P EST MOD 30 MIN: CPT | Performed by: FAMILY MEDICINE

## 2021-10-07 PROCEDURE — 87186 SC STD MICRODIL/AGAR DIL: CPT | Performed by: FAMILY MEDICINE

## 2021-10-07 PROCEDURE — 87070 CULTURE OTHR SPECIMN AEROBIC: CPT | Performed by: FAMILY MEDICINE

## 2021-10-07 PROCEDURE — 87205 SMEAR GRAM STAIN: CPT | Performed by: FAMILY MEDICINE

## 2021-10-07 PROCEDURE — 99213 OFFICE O/P EST LOW 20 MIN: CPT | Performed by: FAMILY MEDICINE

## 2021-10-07 RX ORDER — LIDOCAINE HYDROCHLORIDE 40 MG/ML
5 SOLUTION TOPICAL ONCE
Status: COMPLETED | OUTPATIENT
Start: 2021-10-07 | End: 2021-10-07

## 2021-10-07 RX ADMIN — LIDOCAINE HYDROCHLORIDE 5 ML: 40 SOLUTION TOPICAL at 10:22

## 2021-10-08 ENCOUNTER — TELEPHONE (OUTPATIENT)
Dept: FAMILY MEDICINE CLINIC | Facility: CLINIC | Age: 61
End: 2021-10-08

## 2021-10-09 LAB
BACTERIA WND AEROBE CULT: ABNORMAL
GRAM STN SPEC: ABNORMAL
GRAM STN SPEC: ABNORMAL

## 2021-10-11 DIAGNOSIS — M54.50 CHRONIC LOW BACK PAIN WITHOUT SCIATICA, UNSPECIFIED BACK PAIN LATERALITY: ICD-10-CM

## 2021-10-11 DIAGNOSIS — G89.29 CHRONIC LOW BACK PAIN WITHOUT SCIATICA, UNSPECIFIED BACK PAIN LATERALITY: ICD-10-CM

## 2021-10-11 DIAGNOSIS — K21.9 GASTROESOPHAGEAL REFLUX DISEASE: ICD-10-CM

## 2021-10-11 RX ORDER — SULFAMETHOXAZOLE AND TRIMETHOPRIM 800; 160 MG/1; MG/1
1 TABLET ORAL EVERY 12 HOURS SCHEDULED
Qty: 20 TABLET | Refills: 0 | Status: SHIPPED | OUTPATIENT
Start: 2021-10-11 | End: 2021-10-21

## 2021-10-11 RX ORDER — OMEPRAZOLE 40 MG/1
CAPSULE, DELAYED RELEASE ORAL
Qty: 90 CAPSULE | Refills: 0 | Status: SHIPPED | OUTPATIENT
Start: 2021-10-11 | End: 2021-12-02 | Stop reason: SDUPTHER

## 2021-10-12 RX ORDER — IBUPROFEN 800 MG/1
TABLET ORAL
Qty: 60 TABLET | Refills: 0 | Status: SHIPPED | OUTPATIENT
Start: 2021-10-12 | End: 2021-12-02 | Stop reason: SDUPTHER

## 2021-10-19 ENCOUNTER — LAB (OUTPATIENT)
Dept: LAB | Facility: HOSPITAL | Age: 61
End: 2021-10-19
Attending: FAMILY MEDICINE
Payer: MEDICARE

## 2021-10-19 DIAGNOSIS — E55.9 VITAMIN D DEFICIENCY: ICD-10-CM

## 2021-10-19 DIAGNOSIS — L89.133 PRESSURE ULCER OF RIGHT LOWER BACK, STAGE 3 (HCC): ICD-10-CM

## 2021-10-19 LAB
25(OH)D3 SERPL-MCNC: 18.5 NG/ML (ref 30–100)
ANION GAP SERPL CALCULATED.3IONS-SCNC: 10 MMOL/L (ref 4–13)
BUN SERPL-MCNC: 20 MG/DL (ref 5–25)
CALCIUM SERPL-MCNC: 8.8 MG/DL (ref 8.3–10.1)
CHLORIDE SERPL-SCNC: 105 MMOL/L (ref 100–108)
CO2 SERPL-SCNC: 22 MMOL/L (ref 21–32)
CREAT SERPL-MCNC: 0.67 MG/DL (ref 0.6–1.3)
GFR SERPL CREATININE-BSD FRML MDRD: 121 ML/MIN/1.73SQ M
GLUCOSE P FAST SERPL-MCNC: 72 MG/DL (ref 65–99)
POTASSIUM SERPL-SCNC: 4.1 MMOL/L (ref 3.5–5.3)
SODIUM SERPL-SCNC: 137 MMOL/L (ref 136–145)

## 2021-10-19 PROCEDURE — 80048 BASIC METABOLIC PNL TOTAL CA: CPT

## 2021-10-19 PROCEDURE — 82306 VITAMIN D 25 HYDROXY: CPT

## 2021-10-19 PROCEDURE — 36415 COLL VENOUS BLD VENIPUNCTURE: CPT

## 2021-10-20 DIAGNOSIS — E55.9 VITAMIN D DEFICIENCY: Primary | ICD-10-CM

## 2021-10-20 RX ORDER — ERGOCALCIFEROL 1.25 MG/1
50000 CAPSULE ORAL WEEKLY
Qty: 12 CAPSULE | Refills: 1 | Status: SHIPPED | OUTPATIENT
Start: 2021-10-20

## 2021-10-27 ENCOUNTER — HOSPITAL ENCOUNTER (OUTPATIENT)
Dept: CT IMAGING | Facility: HOSPITAL | Age: 61
Discharge: HOME | End: 2021-10-27
Attending: FAMILY MEDICINE

## 2021-10-27 DIAGNOSIS — L89.133 PRESSURE ULCER OF RIGHT LOWER BACK, STAGE 3 (HCC): ICD-10-CM

## 2021-11-05 ENCOUNTER — OFFICE VISIT (OUTPATIENT)
Dept: WOUND CARE | Facility: HOSPITAL | Age: 61
End: 2021-11-05
Payer: MEDICARE

## 2021-11-05 VITALS
SYSTOLIC BLOOD PRESSURE: 106 MMHG | TEMPERATURE: 99.1 F | RESPIRATION RATE: 18 BRPM | HEART RATE: 60 BPM | DIASTOLIC BLOOD PRESSURE: 60 MMHG

## 2021-11-05 DIAGNOSIS — L89.154 STAGE IV PRESSURE ULCER OF SACRAL REGION (HCC): ICD-10-CM

## 2021-11-05 DIAGNOSIS — L89.223 STAGE III PRESSURE ULCER OF LEFT HIP (HCC): ICD-10-CM

## 2021-11-05 DIAGNOSIS — L89.133 PRESSURE ULCER OF RIGHT LOWER BACK, STAGE 3 (HCC): Primary | ICD-10-CM

## 2021-11-05 PROCEDURE — 99213 OFFICE O/P EST LOW 20 MIN: CPT | Performed by: SURGERY

## 2021-11-05 PROCEDURE — 10140 I&D HMTMA SEROMA/FLUID COLLJ: CPT | Performed by: SURGERY

## 2021-11-12 ENCOUNTER — OFFICE VISIT (OUTPATIENT)
Dept: WOUND CARE | Facility: HOSPITAL | Age: 61
End: 2021-11-12
Payer: MEDICARE

## 2021-11-12 VITALS
TEMPERATURE: 96.9 F | SYSTOLIC BLOOD PRESSURE: 140 MMHG | HEART RATE: 60 BPM | RESPIRATION RATE: 12 BRPM | DIASTOLIC BLOOD PRESSURE: 88 MMHG

## 2021-11-12 DIAGNOSIS — L89.223 STAGE III PRESSURE ULCER OF LEFT HIP (HCC): Primary | ICD-10-CM

## 2021-11-12 DIAGNOSIS — L89.44 PRESSURE INJURY OF CONTIGUOUS REGION INVOLVING BACK AND LEFT BUTTOCK, STAGE 4 (HCC): ICD-10-CM

## 2021-11-12 DIAGNOSIS — L89.154 STAGE IV PRESSURE ULCER OF SACRAL REGION (HCC): ICD-10-CM

## 2021-11-12 DIAGNOSIS — L89.133 PRESSURE ULCER OF RIGHT LOWER BACK, STAGE 3 (HCC): ICD-10-CM

## 2021-11-12 PROCEDURE — 11042 DBRDMT SUBQ TIS 1ST 20SQCM/<: CPT | Performed by: SURGERY

## 2021-11-12 PROCEDURE — 11043 DBRDMT MUSC&/FSCA 1ST 20/<: CPT | Performed by: SURGERY

## 2021-11-12 RX ORDER — LIDOCAINE HYDROCHLORIDE 40 MG/ML
5 SOLUTION TOPICAL ONCE
Status: COMPLETED | OUTPATIENT
Start: 2021-11-12 | End: 2021-11-12

## 2021-11-12 RX ADMIN — LIDOCAINE HYDROCHLORIDE 5 ML: 40 SOLUTION TOPICAL at 12:08

## 2021-11-19 PROBLEM — L89.44 PRESSURE INJURY OF CONTIGUOUS REGION INVOLVING BACK AND LEFT BUTTOCK, STAGE 4 (HCC): Status: ACTIVE | Noted: 2021-11-19

## 2021-12-02 DIAGNOSIS — G89.29 CHRONIC LOW BACK PAIN WITHOUT SCIATICA, UNSPECIFIED BACK PAIN LATERALITY: ICD-10-CM

## 2021-12-02 DIAGNOSIS — E11.9 TYPE 2 DIABETES MELLITUS WITHOUT COMPLICATION, WITHOUT LONG-TERM CURRENT USE OF INSULIN (HCC): ICD-10-CM

## 2021-12-02 DIAGNOSIS — K21.9 GASTROESOPHAGEAL REFLUX DISEASE: ICD-10-CM

## 2021-12-02 DIAGNOSIS — L89.324 STAGE IV PRESSURE ULCER OF LEFT BUTTOCK (HCC): ICD-10-CM

## 2021-12-02 DIAGNOSIS — M54.50 CHRONIC LOW BACK PAIN WITHOUT SCIATICA, UNSPECIFIED BACK PAIN LATERALITY: ICD-10-CM

## 2021-12-03 RX ORDER — LANCETS
EACH MISCELLANEOUS DAILY
Qty: 100 EACH | Refills: 0 | Status: SHIPPED | OUTPATIENT
Start: 2021-12-03 | End: 2022-03-02 | Stop reason: SDUPTHER

## 2021-12-06 RX ORDER — OXYCODONE HYDROCHLORIDE 20 MG/1
20 TABLET ORAL 3 TIMES DAILY PRN
Qty: 90 TABLET | Refills: 0 | Status: SHIPPED | OUTPATIENT
Start: 2021-12-06 | End: 2021-12-29 | Stop reason: SDUPTHER

## 2021-12-06 RX ORDER — ASPIRIN 81 MG/1
81 TABLET ORAL DAILY
Qty: 30 TABLET | Refills: 0 | Status: SHIPPED | OUTPATIENT
Start: 2021-12-06 | End: 2022-01-31 | Stop reason: SDUPTHER

## 2021-12-06 RX ORDER — OMEPRAZOLE 40 MG/1
40 CAPSULE, DELAYED RELEASE ORAL DAILY
Qty: 90 CAPSULE | Refills: 0 | Status: SHIPPED | OUTPATIENT
Start: 2021-12-06 | End: 2022-03-02 | Stop reason: SDUPTHER

## 2021-12-06 RX ORDER — BLOOD SUGAR DIAGNOSTIC
1 STRIP MISCELLANEOUS DAILY
Qty: 100 EACH | Refills: 0 | Status: SHIPPED | OUTPATIENT
Start: 2021-12-06 | End: 2022-01-31 | Stop reason: SDUPTHER

## 2021-12-06 RX ORDER — IBUPROFEN 800 MG/1
800 TABLET ORAL EVERY 8 HOURS PRN
Qty: 60 TABLET | Refills: 0 | Status: SHIPPED | OUTPATIENT
Start: 2021-12-06 | End: 2022-01-31 | Stop reason: SDUPTHER

## 2021-12-10 ENCOUNTER — TELEPHONE (OUTPATIENT)
Dept: GASTROENTEROLOGY | Facility: MEDICAL CENTER | Age: 61
End: 2021-12-10

## 2021-12-20 DIAGNOSIS — L89.44 PRESSURE INJURY OF CONTIGUOUS REGION INVOLVING BACK AND LEFT BUTTOCK, STAGE 4 (HCC): ICD-10-CM

## 2021-12-20 DIAGNOSIS — L89.223 STAGE III PRESSURE ULCER OF LEFT HIP (HCC): ICD-10-CM

## 2021-12-20 DIAGNOSIS — L89.154 STAGE IV PRESSURE ULCER OF SACRAL REGION (HCC): ICD-10-CM

## 2021-12-20 DIAGNOSIS — L89.133 PRESSURE ULCER OF RIGHT LOWER BACK, STAGE 3 (HCC): Primary | ICD-10-CM

## 2022-01-25 ENCOUNTER — TELEPHONE (OUTPATIENT)
Dept: OTHER | Facility: OTHER | Age: 62
End: 2022-01-25

## 2022-01-25 NOTE — TELEPHONE ENCOUNTER
Contacted and spoke with visiting nurse Yoselin Doan  States she was unable to pull back any fluid from balloon port on SPT catheter tubing in order to remove catheter for routine change  She attempted to inflate balloon further with a small amount of normal saline, however it just leaked out of the tubing  She cut the catheter plug, expecting the fluid to drain out, however nothing came out  She attempted to remove catheter, however does not feel comfortable pulling any harder on the catheter to attempt to remove it  States urine is still draining into catheter bag  Requesting advice on how to proceed as patient is unable to drive due to his condition and has to arrange transport through [employer] for any appointments

## 2022-01-25 NOTE — TELEPHONE ENCOUNTER
Please call Teena Camacho from [address] [facility]  She is with a patient and she can't get the balloon  to deflate in his catheter  She needs to speak to someone ASAP

## 2022-01-25 NOTE — TELEPHONE ENCOUNTER
Rut Ku from [facility] called me back and stated that she just hung up with the patient and his SPT fell out  She is on her way back over to his house to assess and place SPT tube  She will call back with update  Rut Ku called back after leaving patient's house and informed me that SPT tube was inserted without difficulty and she was able to flush the new tube without concern  Rut Ku would like to cancel appointment for tomorrow and she will contact the office if any further assistance is needed moving forward

## 2022-01-25 NOTE — TELEPHONE ENCOUNTER
Call placed to [facility] and spoke with her  Informed her of the recommendations of the [facility] at this time  Pt is unable to get to the office until tomorrow at the earliest because he relies on public transportation to get him to and from places  Pt is tentatively scheduled for tomorrow at 9:45am with Dr Romel Hi for difficult SPT change and removal    [facility] will be calling the patient to confirm and will call the office back if patient is not able to keep this appointment  Will await her return call back to confirm appointment

## 2022-01-31 DIAGNOSIS — L89.324 STAGE IV PRESSURE ULCER OF LEFT BUTTOCK (HCC): ICD-10-CM

## 2022-01-31 DIAGNOSIS — M54.50 CHRONIC LOW BACK PAIN WITHOUT SCIATICA, UNSPECIFIED BACK PAIN LATERALITY: ICD-10-CM

## 2022-01-31 DIAGNOSIS — G89.29 CHRONIC LOW BACK PAIN WITHOUT SCIATICA, UNSPECIFIED BACK PAIN LATERALITY: ICD-10-CM

## 2022-01-31 DIAGNOSIS — E11.9 TYPE 2 DIABETES MELLITUS WITHOUT COMPLICATION, WITHOUT LONG-TERM CURRENT USE OF INSULIN (HCC): ICD-10-CM

## 2022-02-01 RX ORDER — BLOOD SUGAR DIAGNOSTIC
1 STRIP MISCELLANEOUS DAILY
Qty: 100 EACH | Refills: 0 | Status: SHIPPED | OUTPATIENT
Start: 2022-02-01 | End: 2022-03-02 | Stop reason: SDUPTHER

## 2022-02-01 RX ORDER — IBUPROFEN 800 MG/1
800 TABLET ORAL EVERY 8 HOURS PRN
Qty: 60 TABLET | Refills: 0 | Status: SHIPPED | OUTPATIENT
Start: 2022-02-01 | End: 2022-03-02 | Stop reason: SDUPTHER

## 2022-02-01 RX ORDER — OXYCODONE HYDROCHLORIDE 20 MG/1
20 TABLET ORAL 3 TIMES DAILY PRN
Qty: 90 TABLET | Refills: 0 | Status: SHIPPED | OUTPATIENT
Start: 2022-02-01 | End: 2022-03-02 | Stop reason: SDUPTHER

## 2022-02-01 RX ORDER — ASPIRIN 81 MG/1
81 TABLET ORAL DAILY
Qty: 30 TABLET | Refills: 0 | Status: SHIPPED | OUTPATIENT
Start: 2022-02-01 | End: 2022-03-02 | Stop reason: SDUPTHER

## 2022-02-01 NOTE — TELEPHONE ENCOUNTER
Pt managed by [address] calling with questions regarding sp/ tube cystostomy and up coming surgery  [de-identified] : rash [[email or web address]] : WARREN is here with complaint of of gradual onset of red, pruritic localized rash that comes and goes for the past week on his trunk, flexor surface of arms and legs and scalp .He has been fussier than usual, rubbing at his scalp and created a raised area of redness from scratching.  \par Mom is using a non fragrant moisturizer, Dreft detergent and no fabric softener, hypoallergenic soap. \par He sleeps well 4-8 hours at night but cat naps in day ~ 30 min.\par Warren has a history of struggling with milk protein sensitivity (better on Nutramigen and Breast MILK (Mom is Soy and Dairy free) but growing well and no further blood in stool. Last GI was in December. He feeds well 4-5oz every 3-4 hours but seems hungry. (he will spit up if mom increased ounces).\par No fatigue, headache, fever, decreased appetite, n/v/d, or cold symptoms. He had COIVD 12/28/21.\par

## 2022-02-02 ENCOUNTER — TELEPHONE (OUTPATIENT)
Dept: FAMILY MEDICINE CLINIC | Facility: CLINIC | Age: 62
End: 2022-02-02

## 2022-02-02 NOTE — TELEPHONE ENCOUNTER
accu check guide test strip  form received by fax on 2/2/22  to be completed by PCP  Copy made and placed in PCP folder  Forms to be delivered to PCP mailbox at assigned time

## 2022-02-04 ENCOUNTER — OFFICE VISIT (OUTPATIENT)
Dept: WOUND CARE | Facility: CLINIC | Age: 62
End: 2022-02-04
Payer: MEDICARE

## 2022-02-04 VITALS
HEART RATE: 72 BPM | SYSTOLIC BLOOD PRESSURE: 106 MMHG | DIASTOLIC BLOOD PRESSURE: 76 MMHG | RESPIRATION RATE: 18 BRPM | TEMPERATURE: 99 F

## 2022-02-04 DIAGNOSIS — L89.323: ICD-10-CM

## 2022-02-04 DIAGNOSIS — L89.133 PRESSURE ULCER OF RIGHT LOWER BACK, STAGE 3 (HCC): Primary | ICD-10-CM

## 2022-02-04 DIAGNOSIS — L89.154 STAGE IV PRESSURE ULCER OF SACRAL REGION (HCC): ICD-10-CM

## 2022-02-04 DIAGNOSIS — G82.20 PARAPLEGIC SPINAL PARALYSIS (HCC): ICD-10-CM

## 2022-02-04 DIAGNOSIS — L89.223 STAGE III PRESSURE ULCER OF LEFT HIP (HCC): ICD-10-CM

## 2022-02-04 DIAGNOSIS — L89.324 DECUBITUS ULCER OF ISCHIUM, LEFT, STAGE IV (HCC): ICD-10-CM

## 2022-02-04 PROCEDURE — 11042 DBRDMT SUBQ TIS 1ST 20SQCM/<: CPT | Performed by: SURGERY

## 2022-02-04 PROCEDURE — 99213 OFFICE O/P EST LOW 20 MIN: CPT | Performed by: SURGERY

## 2022-02-04 RX ORDER — LIDOCAINE HYDROCHLORIDE 40 MG/ML
5 SOLUTION TOPICAL ONCE
Status: COMPLETED | OUTPATIENT
Start: 2022-02-04 | End: 2022-02-04

## 2022-02-04 RX ADMIN — LIDOCAINE HYDROCHLORIDE 5 ML: 40 SOLUTION TOPICAL at 11:39

## 2022-02-04 NOTE — ASSESSMENT & PLAN NOTE
The opening does have some undermining of the skin in the perianal region but healthy granulation tissue

## 2022-02-04 NOTE — ASSESSMENT & PLAN NOTE
The wound has a small opening but still tracked significant amount of depth up along the ribcage    Continue packing

## 2022-02-04 NOTE — PATIENT INSTRUCTIONS
Orders Placed This Encounter   Procedures    Wound cleansing and dressings     Wound cleansing and dressings          DO NOT put tape on any skin           Left buttock:  Cleanse/Irrigate wound with Normal Saline with next dressing change  Cleanse gaby-wound skin with pH balanced soap and water  Apply skin prep to periwound  Apply primary dressing: Aquacel AG rope  Cover with allevyn life  Change dressing three times a week     Wound Left Hip  Cleanse/Irrigate wound with prophase today in wound center only and resume Normal Saline with next dressing change  Apply skin prep to periwound   Apply primary dressing: -allevyn life foam to hip wound and skin tear on hip  Change three times a week      R lateral back:  Cleanse with normal saline  Get pack wound with 1/2" AMD packing and tape tail to skin  Apply skin prep to periwound  Cover with allevyn border foam   Change three times a week or as needed for drainage      Perinium:  Skin prep to periwound  Cover with allevyn life  Change three times a week      HOME CARE  Continue home care services/skilled nursing - 3 x per week (family to do in between), daily visits for one week to pack right back wound      OFF-LOADING  Avoid pressure at wound site  - all wounds, including right back  Wheelchair Cushion  - ROHO cushion  Do Not Sit for Long Periods of Time  - 1 hr max for meals only, otherwise in bed and turn side to side at least  every 3 hours or more frequently  Reposition in regular bed for now at least every 1-2 hours while awake      Make sure you get your blood work this week      Follow up in 4 weeks         Today's Wound treatment note: All wounds cleansed with normal saline    Redressed as ordered above     Standing Status:   Future     Standing Expiration Date:   2/4/2023

## 2022-02-04 NOTE — PROGRESS NOTES
Patient ID: Scot Kent is a 65 y o  male Date of Birth 1960     Chief Complaint  Chief Complaint   Patient presents with    Follow Up Wound Care Visit     multiple pressure wounds        Allergies  Patient has no known allergies  Assessment:    Decubitus ulcer of ischium, left, stage IV (HCC)  The wounds are about the same with some undermining  The bone is covered with a thin layer  Continue the same care    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, stage 3 ([address] )  The opening does have some undermining of the skin in the perianal region but healthy granulation tissue    Pressure ulcer of right lower back, stage 3 ([address] )  The wound has a small opening but still tracked significant amount of depth up along the ribcage  Continue packing       Diagnoses and all orders for this visit:    Pressure ulcer of right lower back, stage 3 (HCC)  -     Wound cleansing and dressings; Future  -     lidocaine (XYLOCAINE) 4 % topical solution 5 mL    Stage IV pressure ulcer of sacral region ([facility])  -     Wound cleansing and dressings; Future  -     lidocaine (XYLOCAINE) 4 % topical solution 5 mL    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 ([facility])  -     Wound cleansing and dressings; Future  -     lidocaine (XYLOCAINE) 4 % topical solution 5 mL    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, stage 3 (HCC)    Decubitus ulcer of ischium, left, stage IV [facility])    Paraplegic spinal paralysis ([address] )    Other orders  -     Debridement  -     Debridement              Debridement   Wound 08/26/20 Hip Left    Universal Protocol:  Consent given by: patient  Time out: Immediately prior to procedure a "time out" was called to verify the correct patient, procedure, equipment, support staff and site/side marked as required      Performed by: physician  Debridement type: surgical  Level of debridement: subcutaneous tissue  Pain control: lidocaine 4%  Post-debridement measurements  Length (cm): 1 9  Width (cm): 3  Depth (cm): 0 2  Percent debrided: 100%  Surface Area (cm^2): 5 7  Area debrided (cm^2): 5 7  Volume (cm^3): 1 14  Tissue and other material debrided: subcutaneous tissue  Devitalized tissue debrided: biofilm and slough  Instrument(s) utilized: curette  Procedural pain (0-10): insensate  Post-procedural pain: insensate   Response to treatment: procedure was tolerated well    Debridement   Wound 08/26/20 Buttocks Left    Universal Protocol:  Consent given by: patient  Time out: Immediately prior to procedure a "time out" was called to verify the correct patient, procedure, equipment, support staff and site/side marked as required      Performed by: physician  Debridement type: surgical  Level of debridement: subcutaneous tissue  Pain control: lidocaine 4%  Post-debridement measurements  Length (cm): 5 5  Width (cm): 2  Depth (cm): 1 3  Percent debrided: 100%  Surface Area (cm^2): 11  Area debrided (cm^2): 11  Volume (cm^3): 14 3  Tissue and other material debrided: subcutaneous tissue  Devitalized tissue debrided: biofilm and slough  Instrument(s) utilized: curette  Procedural pain (0-10): insensate  Post-procedural pain: insensate   Response to treatment: procedure was tolerated well          Plan:     Wound 08/26/20 Buttocks Left (Active)   Wound Image Images linked 02/04/22 1143   Wound Description Epithelialization;Granulation tissue;Slough 02/04/22 1112   Shelby-wound Assessment Scar Tissue 02/04/22 1112   Wound Length (cm) 5 5 cm 02/04/22 1112   Wound Width (cm) 2 cm 02/04/22 1112   Wound Depth (cm) 1 2 cm 02/04/22 1112   Wound Surface Area (cm^2) 11 cm^2 02/04/22 1112   Wound Volume (cm^3) 13 2 cm^3 02/04/22 1112   Calculated Wound Volume (cm^3) 13 2 cm^3 02/04/22 1112   Change in Wound Size % 54 17 02/04/22 1112   Tunneling in depth located at 5 4 @4, 2 5 @ 9 02/04/22 1112   Undermining 5 5 02/04/22 1112   Undermining is depth extending from 2-10 02/04/22 1112   Drainage Amount Large 02/04/22 1112   Drainage Description Serous 02/04/22 1112   Non-staged Wound Description Full thickness 02/04/22 1112   Treatments Cleansed 02/04/22 1112       Wound 08/26/20 Hip Left (Active)   Wound Image Images linked 02/04/22 1131   Wound Description Pink;Yellow 02/04/22 1111   Shelby-wound Assessment Hyperpigmented;Scar Tissue; Excoriated 02/04/22 1111   Wound Length (cm) 1 9 cm 02/04/22 1111   Wound Width (cm) 3 cm 02/04/22 1111   Wound Depth (cm) 0 1 cm 02/04/22 1111   Wound Surface Area (cm^2) 5 7 cm^2 02/04/22 1111   Wound Volume (cm^3) 0 57 cm^3 02/04/22 1111   Calculated Wound Volume (cm^3) 0 57 cm^3 02/04/22 1111   Change in Wound Size % -147 83 02/04/22 1111   Drainage Amount Moderate 02/04/22 1111   Drainage Description Serosanguineous 02/04/22 1111   Non-staged Wound Description Full thickness 02/04/22 1111   Treatments Cleansed 02/04/22 1111   Dressing Changed Changed 02/04/22 1111   Patient Tolerance Tolerated well 02/04/22 1111   Dressing Status Removed 02/04/22 1111       Wound 07/29/21 Pressure Injury Back Right; Lower; Lateral (Active)   Wound Image Images linked 02/04/22 1145   Wound Description Pink;Yellow 02/04/22 1111   Shelby-wound Assessment Hyperpigmented; Intact;Scar Tissue 02/04/22 1111   Wound Length (cm) 2 cm 02/04/22 1111   Wound Width (cm) 1 cm 02/04/22 1111   Wound Depth (cm) 2 cm 02/04/22 1111   Wound Surface Area (cm^2) 2 cm^2 02/04/22 1111   Wound Volume (cm^3) 4 cm^3 02/04/22 1111   Calculated Wound Volume (cm^3) 4 cm^3 02/04/22 1111   Change in Wound Size % 76 96 02/04/22 1111   Tunneling in depth located at 7 5 02/04/22 1111   Drainage Amount Large 02/04/22 1111   Drainage Description Serous 02/04/22 1111   Non-staged Wound Description Full thickness 02/04/22 1111   Treatments Cleansed 02/04/22 1111       Wound 11/05/21 Pressure Injury Perineum (Active)   Wound Image Images linked 02/04/22 1144   Wound Description Pink;Epithelialization 02/04/22 1115   Pressure Injury Stage 3 02/04/22 1115   Shelby-wound Assessment Intact;Scar Tissue 02/04/22 1115   Wound Length (cm) 2 cm 02/04/22 1115   Wound Width (cm) 1 4 cm 02/04/22 1115   Wound Depth (cm) 1 cm 02/04/22 1115   Wound Surface Area (cm^2) 2 8 cm^2 02/04/22 1115   Wound Volume (cm^3) 2 8 cm^3 02/04/22 1115   Calculated Wound Volume (cm^3) 2 8 cm^3 02/04/22 1115   Change in Wound Size % -2700 02/04/22 1115   Undermining 3 02/04/22 1115   Undermining is depth extending from 7-2 02/04/22 1115       Wound 08/26/20 Buttocks Left (Active)   Date First Assessed/Time First Assessed: 08/26/20 1056   Primary Wound Type: Pressure Injury  Location: Buttocks  Wound Location Orientation: Left       Wound 08/26/20 Hip Left (Active)   Date First Assessed/Time First Assessed: 08/26/20 1057   Primary Wound Type: Pressure Injury  Location: Hip  Wound Location Orientation: Left       Wound 07/29/21 Pressure Injury Back Right; Lower; Lateral (Active)   Date First Assessed: 07/29/21   Primary Wound Type: Pressure Injury  Location: Back  Wound Location Orientation: Right; Lower; Lateral       Wound 11/05/21 Pressure Injury Perineum (Active)   Date First Assessed/Time First Assessed: 11/05/21 1059   Primary Wound Type: Pressure Injury  Location: Perineum       [REMOVED] Wound 08/26/19 Buttocks Left;Distal;Lateral (Removed)   Resolved Date/Resolved Time: 08/26/20 1056  Date First Assessed/Time First Assessed: 08/26/19 1130   Pre-Existing Wound: Yes  Location: Buttocks  Wound Location Orientation: Left;Distal;Lateral  Wound Description (Comments): Deep ischial wound       [REMOVED] Wound 09/16/19 Buttocks Right;Distal (Removed)   Resolved Date/Resolved Time: 08/26/20 1055  Date First Assessed/Time First Assessed: 09/16/19 1657   Pre-Existing Wound: Yes  Location: Buttocks  Wound Location Orientation: Right;Distal       [REMOVED] Wound 10/28/19 Knee Left (Removed)   Resolved Date/Resolved Time: 08/26/20 1055  Date First Assessed/Time First Assessed: 10/28/19 0657   Pre-Existing Wound: Yes  Location: Knee Wound Location Orientation: Left  Wound Description (Comments): round black  Dressing Status: Open to air       [REMOVED] Wound 10/28/19 Buttocks Left;Mid (Removed)   Resolved Date/Resolved Time: 08/26/20 1056  Date First Assessed/Time First Assessed: 10/28/19 1108   Pre-Existing Wound: Yes  Location: Buttocks  Wound Location Orientation: Left;Mid  Wound Description (Comments): Stage 2 vs traumatic injury       [REMOVED] Wound 11/01/19 Other (Comment) N/A (Removed)   Resolved Date/Resolved Time: 08/26/20 1055  Date First Assessed/Time First Assessed: 11/01/19 1640   Location: Other (Comment)  Wound Location Orientation: N/A  Wound Description (Comments): scrotum dressed with exofin       Subjective:        Mr Zachary Garcia is a 61-year-old gentleman with paraplegia and history of multiple pressure wounds with 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  He had been rescheduled for a debridement and flap closure by plastics in August but developed a new wound on his right mid to lower back chest wall  This wound probes deep and has been closed on the outside but the tract has not been improving  He had a CT scan that shows a deep tracking to the muscle but no fistulization to the internal thoracic cavity  02/04/2022 he returns now for re-evaluation  He still has had visiting nurses but has not been seen in the [address] [facility] since November  He denies any change or complaint      The following portions of the patient's history were reviewed and updated as appropriate: allergies, current medications, past family history, past medical history, past social history, past surgical history and problem list     Review of Systems   Constitutional: Negative for chills and fever  HENT: Negative for ear pain and sore throat  Eyes: Negative for pain and visual disturbance  Respiratory: Negative for cough and shortness of breath  Cardiovascular: Negative for chest pain and palpitations     Gastrointestinal: Negative for abdominal pain and vomiting  Skin: Negative for color change and rash  Neurological: Positive for weakness and numbness  Psychiatric/Behavioral: Negative for agitation and behavioral problems  All other systems reviewed and are negative  Objective:       Wound 08/26/20 Buttocks Left (Active)   Wound Image Images linked 02/04/22 1143   Wound Description Epithelialization;Granulation tissue;Slough 02/04/22 1112   Shelby-wound Assessment Scar Tissue 02/04/22 1112   Wound Length (cm) 5 5 cm 02/04/22 1112   Wound Width (cm) 2 cm 02/04/22 1112   Wound Depth (cm) 1 2 cm 02/04/22 1112   Wound Surface Area (cm^2) 11 cm^2 02/04/22 1112   Wound Volume (cm^3) 13 2 cm^3 02/04/22 1112   Calculated Wound Volume (cm^3) 13 2 cm^3 02/04/22 1112   Change in Wound Size % 54 17 02/04/22 1112   Tunneling in depth located at 5 4 @4, 2 5 @ 9 02/04/22 1112   Undermining 5 5 02/04/22 1112   Undermining is depth extending from 2-10 02/04/22 1112   Drainage Amount Large 02/04/22 1112   Drainage Description Serous 02/04/22 1112   Non-staged Wound Description Full thickness 02/04/22 1112   Treatments Cleansed 02/04/22 1112       Wound 08/26/20 Hip Left (Active)   Wound Image Images linked 02/04/22 1131   Wound Description Pink;Yellow 02/04/22 1111   Shelby-wound Assessment Hyperpigmented;Scar Tissue; Excoriated 02/04/22 1111   Wound Length (cm) 1 9 cm 02/04/22 1111   Wound Width (cm) 3 cm 02/04/22 1111   Wound Depth (cm) 0 1 cm 02/04/22 1111   Wound Surface Area (cm^2) 5 7 cm^2 02/04/22 1111   Wound Volume (cm^3) 0 57 cm^3 02/04/22 1111   Calculated Wound Volume (cm^3) 0 57 cm^3 02/04/22 1111   Change in Wound Size % -147 83 02/04/22 1111   Drainage Amount Moderate 02/04/22 1111   Drainage Description Serosanguineous 02/04/22 1111   Non-staged Wound Description Full thickness 02/04/22 1111   Treatments Cleansed 02/04/22 1111   Dressing Changed Changed 02/04/22 1111   Patient Tolerance Tolerated well 02/04/22 1111 Dressing Status Removed 02/04/22 1111       Wound 07/29/21 Pressure Injury Back Right; Lower; Lateral (Active)   Wound Image Images linked 02/04/22 1145   Wound Description Pink;Yellow 02/04/22 1111   Shelby-wound Assessment Hyperpigmented; Intact;Scar Tissue 02/04/22 1111   Wound Length (cm) 2 cm 02/04/22 1111   Wound Width (cm) 1 cm 02/04/22 1111   Wound Depth (cm) 2 cm 02/04/22 1111   Wound Surface Area (cm^2) 2 cm^2 02/04/22 1111   Wound Volume (cm^3) 4 cm^3 02/04/22 1111   Calculated Wound Volume (cm^3) 4 cm^3 02/04/22 1111   Change in Wound Size % 76 96 02/04/22 1111   Tunneling in depth located at 7 5 02/04/22 1111   Drainage Amount Large 02/04/22 1111   Drainage Description Serous 02/04/22 1111   Non-staged Wound Description Full thickness 02/04/22 1111   Treatments Cleansed 02/04/22 1111       Wound 11/05/21 Pressure Injury Perineum (Active)   Wound Image Images linked 02/04/22 1144   Wound Description Pink;Epithelialization 02/04/22 1115   Pressure Injury Stage 3 02/04/22 1115   Shelby-wound Assessment Intact;Scar Tissue 02/04/22 1115   Wound Length (cm) 2 cm 02/04/22 1115   Wound Width (cm) 1 4 cm 02/04/22 1115   Wound Depth (cm) 1 cm 02/04/22 1115   Wound Surface Area (cm^2) 2 8 cm^2 02/04/22 1115   Wound Volume (cm^3) 2 8 cm^3 02/04/22 1115   Calculated Wound Volume (cm^3) 2 8 cm^3 02/04/22 1115   Change in Wound Size % -2700 02/04/22 1115   Undermining 3 02/04/22 1115   Undermining is depth extending from 7-2 02/04/22 1115       /76   Pulse 72   Temp 99 °F (37 2 °C)   Resp 18     Physical Exam  Vitals and nursing note reviewed  Exam conducted with a chaperone present  Constitutional:       Appearance: Normal appearance  Cardiovascular:      Rate and Rhythm: Normal rate and regular rhythm  Pulmonary:      Effort: Pulmonary effort is normal       Breath sounds: Normal breath sounds  Abdominal:      General: There is no distension  Palpations: Abdomen is soft  There is no mass  Tenderness: There is no abdominal tenderness  Comments: Ostomy   Musculoskeletal:      Comments: Paraplegia  Right hip disarticulation and removal   Skin:     General: Skin is warm and dry  Comments: See complete wound assessment   Neurological:      Mental Status: He is alert  Psychiatric:         Mood and Affect: Mood normal          Behavior: Behavior normal            Wound Instructions:  Orders Placed This Encounter   Procedures    Wound cleansing and dressings     Wound cleansing and dressings          DO NOT put tape on any skin           Left buttock:  Cleanse/Irrigate wound with Normal Saline with next dressing change  Cleanse gaby-wound skin with pH balanced soap and water  Apply skin prep to periwound  Apply primary dressing: Aquacel AG rope  Cover with allevyn life  Change dressing three times a week     Wound Left Hip  Cleanse/Irrigate wound with prophase today in wound center only and resume Normal Saline with next dressing change  Apply skin prep to periwound   Apply primary dressing: -allevyn life foam to hip wound and skin tear on hip  Change three times a week      R lateral back:  Cleanse with normal saline  Alisonley pack wound with 1/2" AMD packing and tape tail to skin  Apply skin prep to periwound  Cover with allevyn border foam   Change three times a week or as needed for drainage      Perinium:  Skin prep to periwound  Cover with allevyn life  Change three times a week      HOME CARE  Continue home care services/skilled nursing - 3 x per week (family to do in between), daily visits for one week to pack right back wound      OFF-LOADING  Avoid pressure at wound site  - all wounds, including right back  Wheelchair Cushion  - ROHO cushion  Do Not Sit for Long Periods of Time   - 1 hr max for meals only, otherwise in bed and turn side to side at least  every 3 hours or more frequently  Reposition in regular bed for now at least every 1-2 hours while awake      Make sure you get your blood work this week      Follow up in 4 weeks         Today's Wound treatment note: All wounds cleansed with normal saline  Redressed as ordered above     Standing Status:   Future     Standing Expiration Date:   2/4/2023    Debridement     This order was created via procedure documentation    Debridement     This order was created via procedure documentation        Diagnosis ICD-10-CM Associated Orders   1  Pressure ulcer of right lower back, stage 3 ([facility])  L89 133 Wound cleansing and dressings     lidocaine (XYLOCAINE) 4 % topical solution 5 mL   2  Stage IV pressure ulcer of sacral region ([facility])  L89 154 Wound cleansing and dressings     lidocaine (XYLOCAINE) 4 % topical solution 5 mL   3  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 ([facility])  M63 557 Wound cleansing and dressings     lidocaine (XYLOCAINE) 4 % topical solution 5 mL   4  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, stage 3 ([address] )  L89 323    5  Decubitus ulcer of ischium, left, stage IV ([facility])  L89 324    6   Paraplegic spinal paralysis ([address] )  G82 20

## 2022-02-04 NOTE — ASSESSMENT & PLAN NOTE
The wounds are about the same with some undermining  The bone is covered with a thin layer    Continue the same care

## 2022-02-08 ENCOUNTER — OFFICE VISIT (OUTPATIENT)
Dept: FAMILY MEDICINE CLINIC | Facility: CLINIC | Age: 62
End: 2022-02-08

## 2022-02-08 VITALS
HEART RATE: 62 BPM | RESPIRATION RATE: 18 BRPM | SYSTOLIC BLOOD PRESSURE: 162 MMHG | TEMPERATURE: 97.8 F | DIASTOLIC BLOOD PRESSURE: 100 MMHG | OXYGEN SATURATION: 99 %

## 2022-02-08 DIAGNOSIS — E11.9 TYPE 2 DIABETES MELLITUS WITHOUT COMPLICATION, WITHOUT LONG-TERM CURRENT USE OF INSULIN (HCC): Primary | ICD-10-CM

## 2022-02-08 PROCEDURE — 3080F DIAST BP >= 90 MM HG: CPT | Performed by: FAMILY MEDICINE

## 2022-02-08 PROCEDURE — 3077F SYST BP >= 140 MM HG: CPT | Performed by: FAMILY MEDICINE

## 2022-02-08 PROCEDURE — 99214 OFFICE O/P EST MOD 30 MIN: CPT | Performed by: FAMILY MEDICINE

## 2022-02-08 RX ORDER — LANCETS
EACH MISCELLANEOUS DAILY
Qty: 100 EACH | Refills: 0 | OUTPATIENT
Start: 2022-02-08

## 2022-02-08 NOTE — PROGRESS NOTES
Assessment/Plan:    No problem-specific Assessment & Plan notes found for this encounter  Diagnoses and all orders for this visit:    Type 2 diabetes mellitus without complication, without long-term current use of insulin ([facility])  -     CBC and differential; Future  -     Comprehensive metabolic panel; Future  -     Lipid panel; Future  -     TSH, 3rd generation with Free T4 reflex; Future  -     Microalbumin / creatinine urine ratio; Future          Subjective:      Patient ID: Maddie Angulo is a 65 y o  male  64 yo  male with multiple medical comorbidities including paraplegic spinal paralysis with R leg disarticulation at the hip, left ischium wound which required skin flap  He had subsequently undergone penile prosthesis by Dr Robert Anaya  He underwent explantation of the prosthesis 11/4/2019 due to infection and erosion  Also has Neurogenic bladder due to spinal cord injury and paraplegia with history of bladder augmentation and appendical vesicostomy  He is now managed with an indwelling suprapubic tube change monthly and he is doing well with this  No recent infections  Doing well today      The following portions of the patient's history were reviewed and updated as appropriate:   He  has a past medical history of Anemia, Blind, Chronic cystitis, Colostomy in place [facility]), Detrusor sphincter dyssynergia, Diabetes mellitus ([facility] [address] ), Erectile dysfunction, Frequency of urination, GERD (gastroesophageal reflux disease), History of diabetes mellitus, History of osteomyelitis, leg amputation ([facility] [address] ), Hyperlipidemia, Hypertension, Hypospadias, Incomplete bladder emptying, 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([facility] [address] ) (9/16/2019), Neurogenic bladder, Paralysis ([facility] [address] ), Paraplegia ([facility] [address] ), Spinal cord cysts, Ulcer of sacral region [facility]), and Urge incontinence    He   Patient Active Problem List    Diagnosis Date Noted    Pressure injury of contiguous region involving back and left buttock, stage 4 ([address] ) 11/19/2021    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 ([address] ) 11/05/2021    Pressure ulcer of right lower back, stage 3 ([address] ) 11/05/2021    Type 2 diabetes mellitus without complication, without long-term current use of insulin ([address] ) 09/29/2021    Renal cyst 11/18/2019    Other male erectile dysfunction 11/18/2019    Prostate cancer screening 11/18/2019    Hypokalemia 10/29/2019    SBO (small bowel obstruction) ([address] ) 10/28/2019    Sepsis ([address] ) 10/28/2019    Stage II pressure ulcer of buttock ([address] ) 09/17/2019    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, stage 3 ([address] ) 09/16/2019    Hyperkalemia 09/14/2019    History of Clostridium difficile colitis 08/25/2019    Neurogenic bladder 08/23/2019    Sepsis with hypotension ([address] ) 08/23/2019    Osteomyelitis of pelvic region ([address] ) 05/06/2019    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    Colostomy in place [facility]) 04/23/2019    Colon cancer screening 04/23/2019    Dyspepsia 04/23/2019    Epigastric pain 04/23/2019    Chronic, continuous use of opioids 01/15/2019    Decubitus ulcer of ischium, left, stage IV ([address] ) 11/12/2018    Diarrhea 09/21/2018    Amputated right leg ([address] ) 07/18/2018    Anxiety 04/23/2018    GERD (gastroesophageal reflux disease)     History of osteomyelitis     Cyst of joint of shoulder 10/20/2016    Anemia 10/20/2016    Paraplegic spinal paralysis ([address] ) 10/20/2016    Sinus tachycardia 10/20/2016    Stage IV pressure ulcer of sacral region ([address] ) 10/15/2016    Stage IV pressure ulcer of left heel ([address] ) 10/15/2016    Diabetes mellitus type II, controlled ([address] ) 03/07/2014    Benign essential hypertension 07/31/2013     He  has a past surgical history that includes Spine surgery; Leg amputation; Bladder surgery; Colostomy; Amputation; pr adj tiss xfer scalp,extrem 10 1-30 sqcm (Left, 5/1/2017); pr muscle-skin flap,trunk (Left, 5/1/2017); Colon surgery; pr muscle-skin flap,trunk (Left, 9/27/2017);  Complex cystometrogram (2014); Uroflowmetry simple / complex (2014); Cystoscopy (2014); Penile prosthesis implant (2011); Meatotomy; CT cystogram (9/19/2019); IR suprapubic catheter placement (9/19/2019); IR PICC reposition (9/23/2019); and Penile prosthesis removal (N/A, 11/1/2019)  His family history includes No Known Problems in his father and mother  He  reports that he quit smoking about 35 years ago  He has a 5 00 pack-year smoking history  He has never used smokeless tobacco  He reports current alcohol use  He reports that he does not use drugs  Current Outpatient Medications   Medication Sig Dispense Refill    Accu-Chek FastClix Lancets MISC Inject under the skin daily 100 each 0    acetaminophen (TYLENOL) 325 mg tablet Take 2 tablets (650 mg total) by mouth every 6 (six) hours as needed for mild pain, headaches or fever 30 tablet 0    aspirin (Aspirin Low Dose) 81 mg EC tablet Take 1 tablet (81 mg total) by mouth daily 30 tablet 0    Blood Glucose Monitoring Suppl (ONE TOUCH ULTRA 2) w/Device KIT by Does not apply route daily 100 each 3    Disposable Gloves (LATEX GLOVES LARGE) MISC Use as needed up to 3 times a day dispo 2 boxes 2 each 11    ergocalciferol (VITAMIN D2) 50,000 units Take 1 capsule (50,000 Units total) by mouth once a week 12 capsule 1    glucose blood (Accu-Chek Guide) test strip Use 1 each daily Use as instructed 100 each 0    ibuprofen (MOTRIN) 800 mg tablet Take 1 tablet (800 mg total) by mouth every 8 (eight) hours as needed for mild pain 60 tablet 0    Incontinence Supply Disposable (SUNBEAM INCONTINENT UNDER PAD) MISC Use 1 per week, dispense 4 reusable underpads 4 each 11    Incontinence Supply Disposable MISC by Does not apply route 2 (two) times a day 60 each 11    naloxone (NARCAN) 4 mg/0 1 mL nasal spray Administer 1 spray into a nostril  If no response after 2-3 minutes, give another dose in the other nostril using a new spray   (Patient not taking: Reported on 2/23/2021) 1 each 1    Nutritional Supplements (GLUCERNA SHAKE) LIQD Take 3 Cans by mouth 3 (three) times a day 90 Can 11    omeprazole (PriLOSEC) 20 mg delayed release capsule Take 1 capsule (20 mg total) by mouth daily (Patient not taking: Reported on 2/26/2020) 90 capsule 0    omeprazole (PriLOSEC) 40 MG capsule Take 1 capsule (40 mg total) by mouth daily 90 capsule 0    oxyCODONE (ROXICODONE) 20 MG TABS Take 1 tablet (20 mg total) by mouth 3 (three) times a day as needed for moderate pain For ongoing pain Max Daily Amount: 60 mg 90 tablet 0    sodium hypochlorite (DAKIN'S HALF-STRENGTH) external solution Soak gauze and pack into wounds with each dressing change as directed  473 mL 1     No current facility-administered medications for this visit  Current Outpatient Medications on File Prior to Visit   Medication Sig    Accu-Chek FastClix Lancets MISC Inject under the skin daily    acetaminophen (TYLENOL) 325 mg tablet Take 2 tablets (650 mg total) by mouth every 6 (six) hours as needed for mild pain, headaches or fever    aspirin (Aspirin Low Dose) 81 mg EC tablet Take 1 tablet (81 mg total) by mouth daily    Blood Glucose Monitoring Suppl (ONE TOUCH ULTRA 2) w/Device KIT by Does not apply route daily    Disposable Gloves (LATEX GLOVES LARGE) MISC Use as needed up to 3 times a day dispo 2 boxes    ergocalciferol (VITAMIN D2) 50,000 units Take 1 capsule (50,000 Units total) by mouth once a week    glucose blood (Accu-Chek Guide) test strip Use 1 each daily Use as instructed    ibuprofen (MOTRIN) 800 mg tablet Take 1 tablet (800 mg total) by mouth every 8 (eight) hours as needed for mild pain    Incontinence Supply Disposable (SUNBEAM INCONTINENT UNDER PAD) MISC Use 1 per week, dispense 4 reusable underpads    Incontinence Supply Disposable MISC by Does not apply route 2 (two) times a day    naloxone (NARCAN) 4 mg/0 1 mL nasal spray Administer 1 spray into a nostril   If no response after 2-3 minutes, give another dose in the other nostril using a new spray  (Patient not taking: Reported on 2/23/2021)    Nutritional Supplements (GLUCERNA SHAKE) LIQD Take 3 Cans by mouth 3 (three) times a day    omeprazole (PriLOSEC) 20 mg delayed release capsule Take 1 capsule (20 mg total) by mouth daily (Patient not taking: Reported on 2/26/2020)    omeprazole (PriLOSEC) 40 MG capsule Take 1 capsule (40 mg total) by mouth daily    oxyCODONE (ROXICODONE) 20 MG TABS Take 1 tablet (20 mg total) by mouth 3 (three) times a day as needed for moderate pain For ongoing pain Max Daily Amount: 60 mg    sodium hypochlorite (DAKIN'S HALF-STRENGTH) external solution Soak gauze and pack into wounds with each dressing change as directed  No current facility-administered medications on file prior to visit       Review of Systems   Musculoskeletal: Positive for back pain and gait problem  Skin: Positive for wound  All other systems reviewed and are negative  Objective:      /100 (BP Location: Left arm, Patient Position: Sitting, Cuff Size: Adult)   Pulse 62   Temp 97 8 °F (36 6 °C) (Temporal)   Resp 18   SpO2 99%          Physical Exam  Vitals and nursing note reviewed  Constitutional:       Appearance: He is well-developed  HENT:      Head: Normocephalic  Right Ear: External ear normal       Left Ear: External ear normal       Nose: Nose normal    Eyes:      Conjunctiva/sclera: Conjunctivae normal       Pupils: Pupils are equal, round, and reactive to light  Neck:      Thyroid: No thyromegaly  Cardiovascular:      Rate and Rhythm: Normal rate and regular rhythm  Heart sounds: Normal heart sounds  Pulmonary:      Effort: Pulmonary effort is normal       Breath sounds: Normal breath sounds  Abdominal:      Palpations: Abdomen is soft  Tenderness: There is no abdominal tenderness  There is no guarding or rebound  Musculoskeletal:         General: Normal range of motion        Cervical back: Normal range of motion and neck supple  Skin:     General: Skin is dry  Findings: Lesion present  Neurological:      Mental Status: He is alert and oriented to person, place, and time  Deep Tendon Reflexes: Reflexes are normal and symmetric

## 2022-02-24 NOTE — PROGRESS NOTES
Progress Note - Tavo Patel Moscat 1960, 63 y o  male MRN: [id number]    Unit/Bed#: [place] [address] -01 Encounter: [id number]    Primary Care Provider: Regina Doyle MD   Date and time admitted to hospital: 10/28/2019  3:48 AM        Sepsis [facility])  Assessment & Plan  · Sepsis most likely secondary to UTI, infected penile prosthesis  · Continue with broad-spectrum and antibiotics as per ID, continue gentle hydration  · Fever free more than 24 hours now  · Follow-up urine/blood culture  · Patient undergo pain and prosthesis eval in OR tomorrow    * SBO (small bowel obstruction) ([facility] [address] )  Assessment & Plan  · SOB improved now  · Continue to monitor    Diabetes mellitus type II, controlled ([facility] )  Assessment & Plan  Lab Results   Component Value Date    HGBA1C 5 2 05/06/2019     No results for input(s): POCGLU in the last 72 hours    Blood Sugar Average: Last 72 hrs:  (P) 82   · Check blood glucose 4 times daily as corrective insulin    History of Clostridium difficile colitis  Assessment & Plan  · On prophylactic oral vancomycin    Decubitus ulcer of ischium, left, stage IV ([facility])  Assessment & Plan  · Chronic left decubitus ulcer recent with chronic osteomyelitis  · No evidence of acute infection  · Follow with wound care management plan and recommendation    Anemia  Assessment & Plan  · Hemoglobin stable and looks at baseline      Lab Results   Component Value Date    HGB 8 4 (L) 10/30/2019    HGB 8 1 (L) 10/29/2019    HGB 10 7 (L) 10/28/2019           VTE Pharmacologic Prophylaxis:   Pharmacologic: Heparin    Patient Centered Rounds: I have performed bedside rounds with nursing staff today    Discussions with Specialists or Other Care Team Provider:     Education and Discussions with Family / Patient:     Current Length of Stay: 3 day(s)    Current Patient Status: Inpatient   Certification Statement: The patient will continue to require additional inpatient hospital stay due to Sepsis requiring IV Pt understand lab result.   antibiotics    Discharge Plan: In 3-4 days    Code Status: Level 1 - Full Code      Subjective:   No complaints  Patient says he improves very well  Objective:     Vitals:   Temp (24hrs), Av 3 °F (36 8 °C), Min:97 8 °F (36 6 °C), Max:98 9 °F (37 2 °C)    Temp:  [97 8 °F (36 6 °C)-98 9 °F (37 2 °C)] 97 8 °F (36 6 °C)  HR:  [62-81] 81  Resp:  [17-20] 17  BP: (112-130)/(65-90) 130/90  SpO2:  [95 %-97 %] 96 %  Body mass index is 23 03 kg/m²  Input and Output Summary (last 24 hours): Intake/Output Summary (Last 24 hours) at 10/31/2019 1710  Last data filed at 10/31/2019 1100  Gross per 24 hour   Intake 1903 75 ml   Output 1525 ml   Net 378 75 ml       Physical Exam:     General appearance: alert  Head: Normocephalic, without obvious abnormality, atraumatic  Lungs: clear to auscultation bilaterally  Heart: regular rate and rhythm  Abdomen: soft, non-tender, positive bowel sounds   Back: negative  Extremities: Left hip disarticulation  Neurologic: Mental status: alertness: alert    Additional Data:     Labs:    Results from last 7 days   Lab Units 10/30/19  0523   WBC Thousand/uL 6 86   HEMOGLOBIN g/dL 8 4*   HEMATOCRIT % 28 3*   PLATELETS Thousands/uL 360   NEUTROS PCT % 86*   LYMPHS PCT % 7*   MONOS PCT % 6   EOS PCT % 1     Results from last 7 days   Lab Units 10/31/19  0544  10/29/19  0537   SODIUM mmol/L 136   < > 136   POTASSIUM mmol/L 3 7   < > 3 2*   CHLORIDE mmol/L 102   < > 104   CO2 mmol/L 25   < > 20*   BUN mg/dL 7   < > 14   CREATININE mg/dL 0 45*   < > 0 57*   ANION GAP mmol/L 9   < > 12   CALCIUM mg/dL 8 5   < > 8 4   ALBUMIN g/dL  --   --  1 6*   TOTAL BILIRUBIN mg/dL  --   --  0 71   ALK PHOS U/L  --   --  63   ALT U/L  --   --  7*   AST U/L  --   --  25   GLUCOSE RANDOM mg/dL 59*   < > 64*    < > = values in this interval not displayed           Results from last 7 days   Lab Units 10/28/19  1350   POC GLUCOSE mg/dl 82         Results from last 7 days   Lab Units 10/28/19  0400   LACTIC ACID mmol/L 0 9           * I Have Reviewed All Lab Data Listed Above  * Additional Pertinent Lab Tests Reviewed: [address] Admission Reviewed    Imaging:    Imaging Reports Reviewed Today Include: images reviewed    Recent Cultures (last 7 days):     Results from last 7 days   Lab Units 10/28/19  0608 10/28/19  0406 10/28/19  0400   BLOOD CULTURE   --  No Growth at 72 hrs  No Growth at 72 hrs  URINE CULTURE  >100,000 cfu/ml Escherichia coli ESBL*  10,000-19,000 cfu/ml Pseudomonas aeruginosa*  <10,000 cfu/ml Enterococcus species*  --   --        Last 24 Hours Medication List:     Current Facility-Administered Medications:  acetaminophen 650 mg Oral Q6H PRN Mary Kay Tiskilwa, PA-C    ertapenem 1,000 mg Intravenous Q24H KALPANA Long Last Rate: 1,000 mg (10/31/19 1230)   heparin (porcine) 5,000 Units Subcutaneous [facility] Mary Kay Tiskilwa, PA-C    HYDROmorphone 0 5 mg Intravenous Q4H PRN Aguila Line, MD    lactated ringers 125 mL/hr Intravenous Continuous Mary Kay Tiskilwa, PA-C Last Rate: 125 mL/hr (10/31/19 1308)   morphine injection 2 mg Intravenous Q3H PRN REAL Trejo-TIGRE    ondansetron 4 mg Intravenous Q6H PRN Mary Kay Tiskilwa, PA-C    oxyCODONE 20 mg Oral TID Maria Fernanda Quinones PA-C    pantoprazole 20 mg Oral Early Morning Aguila Line, MD    vancomycin 125 mg Oral Q12H [address] KALPANA Long         Today, Patient Was Seen By: Ivana Rm MD    ** Please Note: Dictation voice to text software may have been used in the creation of this document   **

## 2022-03-02 DIAGNOSIS — E11.9 TYPE 2 DIABETES MELLITUS WITHOUT COMPLICATION, WITHOUT LONG-TERM CURRENT USE OF INSULIN (HCC): ICD-10-CM

## 2022-03-02 RX ORDER — LANCETS
EACH MISCELLANEOUS DAILY
Qty: 100 EACH | Refills: 0 | Status: SHIPPED | OUTPATIENT
Start: 2022-03-02 | End: 2022-05-03 | Stop reason: SDUPTHER

## 2022-03-10 ENCOUNTER — HOSPITAL ENCOUNTER (OUTPATIENT)
Dept: ULTRASOUND IMAGING | Facility: HOSPITAL | Age: 62
Discharge: HOME | End: 2022-03-10
Attending: UROLOGY
Payer: MEDICARE

## 2022-03-10 ENCOUNTER — APPOINTMENT (OUTPATIENT)
Dept: LAB | Facility: HOSPITAL | Age: 62
End: 2022-03-10
Attending: UROLOGY
Payer: MEDICARE

## 2022-03-10 DIAGNOSIS — N31.9 NEUROGENIC BLADDER: ICD-10-CM

## 2022-03-10 DIAGNOSIS — R97.20 ELEVATED PSA: ICD-10-CM

## 2022-03-10 DIAGNOSIS — E11.9 TYPE 2 DIABETES MELLITUS WITHOUT COMPLICATION, WITHOUT LONG-TERM CURRENT USE OF INSULIN (HCC): ICD-10-CM

## 2022-03-10 LAB
ALBUMIN SERPL BCP-MCNC: 2.7 G/DL (ref 3.5–5)
ALP SERPL-CCNC: 95 U/L (ref 46–116)
ALT SERPL W P-5'-P-CCNC: 18 U/L (ref 12–78)
ANION GAP SERPL CALCULATED.3IONS-SCNC: 9 MMOL/L (ref 4–13)
AST SERPL W P-5'-P-CCNC: 15 U/L (ref 5–45)
BASOPHILS # BLD AUTO: 0.08 THOUSANDS/ΜL (ref 0–0.1)
BASOPHILS NFR BLD AUTO: 1 % (ref 0–1)
BILIRUB SERPL-MCNC: 0.3 MG/DL (ref 0.2–1)
BUN SERPL-MCNC: 18 MG/DL (ref 5–25)
CALCIUM ALBUM COR SERPL-MCNC: 10.1 MG/DL (ref 8.3–10.1)
CALCIUM SERPL-MCNC: 9.1 MG/DL (ref 8.3–10.1)
CHLORIDE SERPL-SCNC: 103 MMOL/L (ref 100–108)
CHOLEST SERPL-MCNC: 154 MG/DL
CO2 SERPL-SCNC: 26 MMOL/L (ref 21–32)
CREAT SERPL-MCNC: 0.45 MG/DL (ref 0.6–1.3)
CREAT UR-MCNC: 86.7 MG/DL
EOSINOPHIL # BLD AUTO: 0.22 THOUSAND/ΜL (ref 0–0.61)
EOSINOPHIL NFR BLD AUTO: 3 % (ref 0–6)
ERYTHROCYTE [DISTWIDTH] IN BLOOD BY AUTOMATED COUNT: 20.3 % (ref 11.6–15.1)
GFR SERPL CREATININE-BSD FRML MDRD: 122 ML/MIN/1.73SQ M
GLUCOSE P FAST SERPL-MCNC: 75 MG/DL (ref 65–99)
HCT VFR BLD AUTO: 36.6 % (ref 36.5–49.3)
HDLC SERPL-MCNC: 45 MG/DL
HGB BLD-MCNC: 11.1 G/DL (ref 12–17)
IMM GRANULOCYTES # BLD AUTO: 0.03 THOUSAND/UL (ref 0–0.2)
IMM GRANULOCYTES NFR BLD AUTO: 0 % (ref 0–2)
LDLC SERPL CALC-MCNC: 91 MG/DL (ref 0–100)
LYMPHOCYTES # BLD AUTO: 1.41 THOUSANDS/ΜL (ref 0.6–4.47)
LYMPHOCYTES NFR BLD AUTO: 18 % (ref 14–44)
MCH RBC QN AUTO: 22.2 PG (ref 26.8–34.3)
MCHC RBC AUTO-ENTMCNC: 30.3 G/DL (ref 31.4–37.4)
MCV RBC AUTO: 73 FL (ref 82–98)
MICROALBUMIN UR-MCNC: 65.5 MG/L (ref 0–20)
MICROALBUMIN/CREAT 24H UR: 76 MG/G CREATININE (ref 0–30)
MONOCYTES # BLD AUTO: 0.66 THOUSAND/ΜL (ref 0.17–1.22)
MONOCYTES NFR BLD AUTO: 8 % (ref 4–12)
NEUTROPHILS # BLD AUTO: 5.5 THOUSANDS/ΜL (ref 1.85–7.62)
NEUTS SEG NFR BLD AUTO: 70 % (ref 43–75)
NONHDLC SERPL-MCNC: 109 MG/DL
NRBC BLD AUTO-RTO: 0 /100 WBCS
PLATELET # BLD AUTO: 319 THOUSANDS/UL (ref 149–390)
PMV BLD AUTO: 10.3 FL (ref 8.9–12.7)
POTASSIUM SERPL-SCNC: 4 MMOL/L (ref 3.5–5.3)
PROT SERPL-MCNC: 8.7 G/DL (ref 6.4–8.2)
PSA SERPL-MCNC: 3.2 NG/ML (ref 0–4)
RBC # BLD AUTO: 5 MILLION/UL (ref 3.88–5.62)
SODIUM SERPL-SCNC: 138 MMOL/L (ref 136–145)
TRIGL SERPL-MCNC: 88 MG/DL
TSH SERPL DL<=0.05 MIU/L-ACNC: 1.01 UIU/ML (ref 0.36–3.74)
WBC # BLD AUTO: 7.9 THOUSAND/UL (ref 4.31–10.16)

## 2022-03-10 PROCEDURE — 82043 UR ALBUMIN QUANTITATIVE: CPT

## 2022-03-10 PROCEDURE — 80061 LIPID PANEL: CPT

## 2022-03-10 PROCEDURE — 84153 ASSAY OF PSA TOTAL: CPT

## 2022-03-10 PROCEDURE — 84443 ASSAY THYROID STIM HORMONE: CPT

## 2022-03-10 PROCEDURE — 80053 COMPREHEN METABOLIC PANEL: CPT

## 2022-03-10 PROCEDURE — 82570 ASSAY OF URINE CREATININE: CPT

## 2022-03-10 PROCEDURE — 36415 COLL VENOUS BLD VENIPUNCTURE: CPT

## 2022-03-10 PROCEDURE — 76770 US EXAM ABDO BACK WALL COMP: CPT

## 2022-03-10 PROCEDURE — 85025 COMPLETE CBC W/AUTO DIFF WBC: CPT

## 2022-03-11 ENCOUNTER — OFFICE VISIT (OUTPATIENT)
Dept: WOUND CARE | Facility: CLINIC | Age: 62
End: 2022-03-11
Payer: MEDICARE

## 2022-03-11 VITALS
RESPIRATION RATE: 18 BRPM | DIASTOLIC BLOOD PRESSURE: 90 MMHG | SYSTOLIC BLOOD PRESSURE: 142 MMHG | TEMPERATURE: 97.4 F | HEART RATE: 68 BPM

## 2022-03-11 DIAGNOSIS — L89.323: Primary | ICD-10-CM

## 2022-03-11 DIAGNOSIS — L89.133 PRESSURE ULCER OF RIGHT LOWER BACK, STAGE 3 (HCC): ICD-10-CM

## 2022-03-11 DIAGNOSIS — L89.154 STAGE IV PRESSURE ULCER OF SACRAL REGION (HCC): ICD-10-CM

## 2022-03-11 DIAGNOSIS — L89.134 PRESSURE INJURY OF RIGHT LOWER BACK, STAGE 4 (HCC): ICD-10-CM

## 2022-03-11 DIAGNOSIS — L89.223 STAGE III PRESSURE ULCER OF LEFT HIP (HCC): ICD-10-CM

## 2022-03-11 PROCEDURE — 11042 DBRDMT SUBQ TIS 1ST 20SQCM/<: CPT | Performed by: SURGERY

## 2022-03-11 NOTE — ASSESSMENT & PLAN NOTE
The central wound on the buttock near the ischium it undermines towards the 9 o'clock direction underneath the stage III hip ulcer  It also connects to the perineal wound  Continue with packing the wound with Aquacel rope in either direction in the undermined areas    Cover the rest the wounds with alginate and foam

## 2022-03-11 NOTE — PATIENT INSTRUCTIONS
Orders Placed This Encounter   Procedures    Wound cleansing and dressings     Left buttock:  Cleanse/Irrigate wound with Normal Saline with next dressing change  Cleanse gaby-wound skin with pH balanced soap and water  Apply skin prep to periwound  Apply primary dressing: Aquacel AG rope  Cover with allevyn life  Change dressing three times a week or as needed for drainage      Wound Left Hip  Cleanse/Irrigate wound with prophase today in wound center only and resume Normal Saline with next dressing change  Apply skin prep to periwound   Apply primary dressing: -allevyn life foam to hip wound and skin tear on hip  Change three times a week or as needed for drainage      R lateral back:  Cleanse with normal saline  Apply primary dressing: Aquacel AG rope  Apply skin prep to periwound  Cover with allevyn border foam   Change three times a week or as needed for drainage      Perinium:  Skin prep to periwound  Cover with allevyn life  Change three times a week or as needed for drainage      HOME CARE  Continue home care services/skilled nursing - 3 x per week (family to do in between), daily visits for one week to pack right back wound      OFF-LOADING  Avoid pressure at wound site  - all wounds, including right back  Wheelchair Cushion  - ROHO cushion  Do Not Sit for Long Periods of Time  - 1 hr max for meals only, otherwise in bed and turn side to side at least  every 3 hours or more frequently  Reposition in regular bed for now at least every 1-2 hours while awake      Follow up in 4 weeks      Today's Wound treatment note: All wounds cleansed with normal saline    Redressed as ordered above     Standing Status:   Future     Standing Expiration Date:   3/11/2023

## 2022-03-11 NOTE — PROGRESS NOTES
Patient ID: Teresa Rodriguez is a 65 y o  male Date of Birth 1960     Chief Complaint  Chief Complaint   Patient presents with    Follow Up Wound Care Visit     Pressure ulcer of right lower back, stage 3 ([address] )       Allergies  Patient has no known allergies  Assessment:    Pressure injury of right lower back, stage 4 (HCC)  Wound still has significant depth and the rib is palpable at the base  The wound was not packed on arrival   Will switch to Aquacel Ag rope  Stage IV pressure ulcer of sacral region [facility])  The central wound on the buttock near the ischium it undermines towards the 9 o'clock direction underneath the stage III hip ulcer  It also connects to the perineal wound  Continue with packing the wound with Aquacel rope in either direction in the undermined areas  Cover the rest the wounds with alginate and foam        Diagnoses and all orders for this visit:    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, stage 3 (HCC)  -     Wound cleansing and dressings; Future    Pressure ulcer of right lower back, stage 3 (HCC)  -     Wound cleansing and dressings; Future    Stage IV pressure ulcer of sacral region ([address] )  -     Wound cleansing and dressings; Future    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)  -     Wound cleansing and dressings; Future    Pressure injury of right lower back, stage 4 ([address] )    Other orders  -     Debridement  -     Debridement              Debridement   Wound 08/26/20 Buttocks Left    Universal Protocol:  Consent given by: patient  Time out: Immediately prior to procedure a "time out" was called to verify the correct patient, procedure, equipment, support staff and site/side marked as required      Performed by: physician  Debridement type: surgical  Level of debridement: subcutaneous tissue  Pain control: lidocaine 4%  Post-debridement measurements  Length (cm): 5 5  Width (cm): 2 1  Depth (cm): 1 4  Percent debrided: 100%  Surface Area (cm^2): 11 55  Area debrided (cm^2): 11 55  Volume (cm^3): 16 17  Tissue and other material debrided: subcutaneous tissue  Devitalized tissue debrided: biofilm and slough  Instrument(s) utilized: curette  Procedural pain (0-10): insensate  Post-procedural pain: insensate   Response to treatment: procedure was tolerated well    Debridement   Wound 07/29/21 Pressure Injury Back Right; Lower; Lateral    Universal Protocol:  Consent given by: patient  Time out: Immediately prior to procedure a "time out" was called to verify the correct patient, procedure, equipment, support staff and site/side marked as required      Performed by: physician  Debridement type: surgical  Level of debridement: subcutaneous tissue  Pain control: lidocaine 4%  Post-debridement measurements  Length (cm): 2 5  Width (cm): 2 8  Depth (cm): 1 7  Percent debrided: 100%  Surface Area (cm^2): 7  Area debrided (cm^2): 7  Volume (cm^3): 11 9  Tissue and other material debrided: subcutaneous tissue  Devitalized tissue debrided: biofilm and slough  Instrument(s) utilized: curette  Procedural pain (0-10): insensate  Post-procedural pain: insensate   Response to treatment: procedure was tolerated well          Plan:     Wound 08/26/20 Buttocks Left (Active)   Wound Image Images linked 03/11/22 1027   Wound Description [place];Pale 03/11/22 0955   Shelby-wound Assessment Maceration;Scar Tissue 03/11/22 0955   Wound Length (cm) 5 5 cm 03/11/22 0955   Wound Width (cm) 2 cm 03/11/22 0955   Wound Depth (cm) 1 3 cm 03/11/22 0955   Wound Surface Area (cm^2) 11 cm^2 03/11/22 0955   Wound Volume (cm^3) 14 3 cm^3 03/11/22 0955   Calculated Wound Volume (cm^3) 14 3 cm^3 03/11/22 0955   Change in Wound Size % 50 35 03/11/22 0955   Undermining 6 1 (communicates with the left hip wound) 03/11/22 0955   Undermining is depth extending from 4-11 03/11/22 0955   Drainage Amount Large 03/11/22 0955   Drainage Description Serosanguineous 03/11/22 0955       Wound 08/26/20 Hip Left (Active)   Wound Image Images linked 03/11/22 0951   Wound Description Beefy red;Pink;Epithelialization (skin island) 03/11/22 1000   Shelby-wound Assessment Scar Tissue; Hyperpigmented 03/11/22 1000   Wound Length (cm) 1 9 cm 03/11/22 1000   Wound Width (cm) 2 9 cm 03/11/22 1000   Wound Depth (cm) 0 3 cm 03/11/22 1000   Wound Surface Area (cm^2) 5 51 cm^2 03/11/22 1000   Wound Volume (cm^3) 1  653 cm^3 03/11/22 1000   Calculated Wound Volume (cm^3) 1 65 cm^3 03/11/22 1000   Change in Wound Size % -617 39 03/11/22 1000   Undermining 0 5 03/11/22 1000   Undermining is depth extending from 3 7 03/11/22 1000   Drainage Amount Moderate 03/11/22 1000   Drainage Description Serosanguineous 03/11/22 1000       Wound 07/29/21 Pressure Injury Back Right; Lower; Lateral (Active)   Wound Image Images linked 03/11/22 1028   Wound Description Beefy red;Pink 03/11/22 1003   Shelby-wound Assessment Hyperpigmented; Intact 03/11/22 1003   Wound Length (cm) 2 5 cm 03/11/22 1003   Wound Width (cm) 2 8 cm 03/11/22 1003   Wound Depth (cm) 1 5 cm 03/11/22 1003   Wound Surface Area (cm^2) 7 cm^2 03/11/22 1003   Wound Volume (cm^3) 10 5 cm^3 03/11/22 1003   Calculated Wound Volume (cm^3) 10 5 cm^3 03/11/22 1003   Change in Wound Size % 39 52 03/11/22 1003   Tunneling in depth located at 6 4 at 11oclock 03/11/22 1003   Drainage Amount Large 03/11/22 1003   Drainage Description Serous 03/11/22 1003       Wound 11/05/21 Pressure Injury Perineum (Active)   Wound Image Images linked 03/11/22 0953   Wound Description [place] 03/11/22 1007   Shelby-wound Assessment Hyperpigmented;Scar Tissue 03/11/22 1007   Wound Length (cm) 2 cm 03/11/22 1007   Wound Width (cm) 1 8 cm 03/11/22 1007   Wound Depth (cm) 1 cm 03/11/22 1007   Wound Surface Area (cm^2) 3 6 cm^2 03/11/22 1007   Wound Volume (cm^3) 3 6 cm^3 03/11/22 1007   Calculated Wound Volume (cm^3) 3 6 cm^3 03/11/22 1007   Change in Wound Size % -3500 03/11/22 1007   Tunneling in depth located at 6 9 at 12 oclock, 3 3 at [facility] 03/11/22 1007   Undermining 2 03/11/22 1007   Undermining is depth extending from 12-12 03/11/22 1007   Drainage Amount Moderate 03/11/22 1007   Drainage Description Serous 03/11/22 1007       Wound 08/26/20 Buttocks Left (Active)   Date First Assessed/Time First Assessed: 08/26/20 1056   Primary Wound Type: Pressure Injury  Location: Buttocks  Wound Location Orientation: Left       Wound 08/26/20 Hip Left (Active)   Date First Assessed/Time First Assessed: 08/26/20 1057   Primary Wound Type: Pressure Injury  Location: Hip  Wound Location Orientation: Left       Wound 07/29/21 Pressure Injury Back Right; Lower; Lateral (Active)   Date First Assessed: 07/29/21   Primary Wound Type: Pressure Injury  Location: Back  Wound Location Orientation: Right; Lower; Lateral       Wound 11/05/21 Pressure Injury Perineum (Active)   Date First Assessed/Time First Assessed: 11/05/21 1059   Primary Wound Type: Pressure Injury  Location: Perineum       [REMOVED] Wound 08/26/19 Buttocks Left;Distal;Lateral (Removed)   Resolved Date/Resolved Time: 08/26/20 1056  Date First Assessed/Time First Assessed: 08/26/19 1130   Pre-Existing Wound: Yes  Location: Buttocks  Wound Location Orientation: Left;Distal;Lateral  Wound Description (Comments): Deep ischial wound       [REMOVED] Wound 09/16/19 Buttocks Right;Distal (Removed)   Resolved Date/Resolved Time: 08/26/20 1055  Date First Assessed/Time First Assessed: 09/16/19 1657   Pre-Existing Wound: Yes  Location: Buttocks  Wound Location Orientation: Right;Distal       [REMOVED] Wound 10/28/19 Knee Left (Removed)   Resolved Date/Resolved Time: 08/26/20 1055  Date First Assessed/Time First Assessed: 10/28/19 0657   Pre-Existing Wound: Yes  Location: Knee  Wound Location Orientation: Left  Wound Description (Comments): round black  Dressing Status: Open to air       [REMOVED] Wound 10/28/19 Buttocks Left;Mid (Removed)   Resolved Date/Resolved Time: 08/26/20 1056  Date First Assessed/Time First Assessed: 10/28/19 1108   Pre-Existing Wound: Yes  Location: Buttocks  Wound Location Orientation: Left;Mid  Wound Description (Comments): Stage 2 vs traumatic injury       [REMOVED] Wound 11/01/19 Other (Comment) N/A (Removed)   Resolved Date/Resolved Time: 08/26/20 1055  Date First Assessed/Time First Assessed: 11/01/19 1640   Location: Other (Comment)  Wound Location Orientation: N/A  Wound Description (Comments): scrotum dressed with exofin       Subjective:        Mr Byron Lopez is a 81-year-old gentleman with paraplegia and history of multiple pressure wounds with 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  He had been rescheduled for a debridement and flap closure by plastics in August but developed a new wound on his right mid to lower back chest wall  This wound probes deep and has been closed on the outside but the tract has not been improving  He had a CT scan that shows a deep tracking to the muscle but no fistulization to the internal thoracic cavity  02/04/2022 he returns now for re-evaluation  He still has had visiting nurses but has not been seen in the [address] [facility] since November  He denies any change or complaint    03/11/2022 no changes since been seen last   No new complaints  The following portions of the patient's history were reviewed and updated as appropriate: allergies, current medications, past family history, past medical history, past social history, past surgical history and problem list     Review of Systems   Constitutional: Negative for chills and fever  HENT: Negative for ear pain and sore throat  Eyes: Negative for pain and visual disturbance  Respiratory: Negative for cough and shortness of breath  Cardiovascular: Negative for chest pain and palpitations  Gastrointestinal: Negative for abdominal pain and vomiting  Skin: Negative for color change and rash  Neurological: Positive for weakness and numbness     Psychiatric/Behavioral: Negative for agitation and behavioral problems  All other systems reviewed and are negative  Objective:       Wound 08/26/20 Buttocks Left (Active)   Wound Image Images linked 03/11/22 1027   Wound Description [place];Pale 03/11/22 0955   Shelby-wound Assessment Maceration;Scar Tissue 03/11/22 0955   Wound Length (cm) 5 5 cm 03/11/22 0955   Wound Width (cm) 2 cm 03/11/22 0955   Wound Depth (cm) 1 3 cm 03/11/22 0955   Wound Surface Area (cm^2) 11 cm^2 03/11/22 0955   Wound Volume (cm^3) 14 3 cm^3 03/11/22 0955   Calculated Wound Volume (cm^3) 14 3 cm^3 03/11/22 0955   Change in Wound Size % 50 35 03/11/22 0955   Undermining 6 1 (communicates with the left hip wound) 03/11/22 0955   Undermining is depth extending from 4-11 03/11/22 0955   Drainage Amount Large 03/11/22 0955   Drainage Description Serosanguineous 03/11/22 0955       Wound 08/26/20 Hip Left (Active)   Wound Image Images linked 03/11/22 0951   Wound Description Beefy red;Pink;Epithelialization (skin island) 03/11/22 1000   Shelby-wound Assessment Scar Tissue; Hyperpigmented 03/11/22 1000   Wound Length (cm) 1 9 cm 03/11/22 1000   Wound Width (cm) 2 9 cm 03/11/22 1000   Wound Depth (cm) 0 3 cm 03/11/22 1000   Wound Surface Area (cm^2) 5 51 cm^2 03/11/22 1000   Wound Volume (cm^3) 1  653 cm^3 03/11/22 1000   Calculated Wound Volume (cm^3) 1 65 cm^3 03/11/22 1000   Change in Wound Size % -617 39 03/11/22 1000   Undermining 0 5 03/11/22 1000   Undermining is depth extending from 3 7 03/11/22 1000   Drainage Amount Moderate 03/11/22 1000   Drainage Description Serosanguineous 03/11/22 1000       Wound 07/29/21 Pressure Injury Back Right; Lower; Lateral (Active)   Wound Image Images linked 03/11/22 1028   Wound Description Beefy red;Pink 03/11/22 1003   Shelby-wound Assessment Hyperpigmented; Intact 03/11/22 1003   Wound Length (cm) 2 5 cm 03/11/22 1003   Wound Width (cm) 2 8 cm 03/11/22 1003   Wound Depth (cm) 1 5 cm 03/11/22 1003   Wound Surface Area (cm^2) 7 cm^2 03/11/22 1003   Wound Volume (cm^3) 10 5 cm^3 03/11/22 1003   Calculated Wound Volume (cm^3) 10 5 cm^3 03/11/22 1003   Change in Wound Size % 39 52 03/11/22 1003   Tunneling in depth located at 6 4 at 11oclock 03/11/22 1003   Drainage Amount Large 03/11/22 1003   Drainage Description Serous 03/11/22 1003       Wound 11/05/21 Pressure Injury Perineum (Active)   Wound Image Images linked 03/11/22 0953   Wound Description [place] 03/11/22 1007   Shelby-wound Assessment Hyperpigmented;Scar Tissue 03/11/22 1007   Wound Length (cm) 2 cm 03/11/22 1007   Wound Width (cm) 1 8 cm 03/11/22 1007   Wound Depth (cm) 1 cm 03/11/22 1007   Wound Surface Area (cm^2) 3 6 cm^2 03/11/22 1007   Wound Volume (cm^3) 3 6 cm^3 03/11/22 1007   Calculated Wound Volume (cm^3) 3 6 cm^3 03/11/22 1007   Change in Wound Size % -3500 03/11/22 1007   Tunneling in depth located at 6 9 at 12 [facility], 3 3 at [facility] 03/11/22 1007   Undermining 2 03/11/22 1007   Undermining is depth extending from 12-12 03/11/22 1007   Drainage Amount Moderate 03/11/22 1007   Drainage Description Serous 03/11/22 1007       /90   Pulse 68   Temp (!) 97 4 °F (36 3 °C)   Resp 18     Physical Exam  Vitals and nursing note reviewed  Exam conducted with a chaperone present  Constitutional:       Appearance: Normal appearance  Cardiovascular:      Rate and Rhythm: Normal rate and regular rhythm  Pulmonary:      Effort: Pulmonary effort is normal       Breath sounds: Normal breath sounds  Abdominal:      General: There is no distension  Palpations: Abdomen is soft  There is no mass  Tenderness: There is no abdominal tenderness  Comments: Ostomy   Musculoskeletal:      Comments: Paraplegia  Right hip disarticulation and removal   Skin:     General: Skin is warm and dry  Comments: See complete wound assessment   Neurological:      Mental Status: He is alert     Psychiatric:         Mood and Affect: Mood normal          Behavior: Behavior normal  Wound Instructions:  Orders Placed This Encounter   Procedures    Wound cleansing and dressings     Left buttock:  Cleanse/Irrigate wound with Normal Saline with next dressing change  Cleanse gaby-wound skin with pH balanced soap and water  Apply skin prep to periwound  Apply primary dressing: Aquacel AG rope  Cover with allevyn life  Change dressing three times a week or as needed for drainage      Wound Left Hip  Cleanse/Irrigate wound with prophase today in wound center only and resume Normal Saline with next dressing change  Apply skin prep to periwound   Apply primary dressing: -allevyn life foam to hip wound and skin tear on hip  Change three times a week or as needed for drainage      R lateral back:  Cleanse with normal saline  Apply primary dressing: Aquacel AG rope  Apply skin prep to periwound  Cover with allevyn border foam   Change three times a week or as needed for drainage      Perinium:  Skin prep to periwound  Cover with allevyn life  Change three times a week or as needed for drainage      HOME CARE  Continue home care services/skilled nursing - 3 x per week (family to do in between), daily visits for one week to pack right back wound      OFF-LOADING  Avoid pressure at wound site  - all wounds, including right back  Wheelchair Cushion  - ROHO cushion  Do Not Sit for Long Periods of Time  - 1 hr max for meals only, otherwise in bed and turn side to side at least  every 3 hours or more frequently  Reposition in regular bed for now at least every 1-2 hours while awake      Follow up in 4 weeks      Today's Wound treatment note: All wounds cleansed with normal saline  Redressed as ordered above     Standing Status:   Future     Standing Expiration Date:   3/11/2023    Debridement     This order was created via procedure documentation    Debridement     This order was created via procedure documentation        Diagnosis ICD-10-CM Associated Orders   1   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

## 2022-03-11 NOTE — ASSESSMENT & PLAN NOTE
Wound still has significant depth and the rib is palpable at the base  The wound was not packed on arrival   Will switch to Aquacel Ag ropandrew

## 2022-03-14 DIAGNOSIS — D64.9 ANEMIA, UNSPECIFIED TYPE: Primary | ICD-10-CM

## 2022-03-28 DIAGNOSIS — M54.50 CHRONIC LOW BACK PAIN WITHOUT SCIATICA, UNSPECIFIED BACK PAIN LATERALITY: ICD-10-CM

## 2022-03-28 DIAGNOSIS — L89.324 STAGE IV PRESSURE ULCER OF LEFT BUTTOCK (HCC): ICD-10-CM

## 2022-03-28 DIAGNOSIS — G89.29 CHRONIC LOW BACK PAIN WITHOUT SCIATICA, UNSPECIFIED BACK PAIN LATERALITY: ICD-10-CM

## 2022-03-29 ENCOUNTER — TELEPHONE (OUTPATIENT)
Dept: FAMILY MEDICINE CLINIC | Facility: CLINIC | Age: 62
End: 2022-03-29

## 2022-03-31 ENCOUNTER — TELEPHONE (OUTPATIENT)
Dept: FAMILY MEDICINE CLINIC | Facility: CLINIC | Age: 62
End: 2022-03-31

## 2022-03-31 RX ORDER — OXYCODONE HYDROCHLORIDE 20 MG/1
20 TABLET ORAL 3 TIMES DAILY PRN
Qty: 90 TABLET | Refills: 0 | Status: SHIPPED | OUTPATIENT
Start: 2022-03-31 | End: 2022-05-03 | Stop reason: SDUPTHER

## 2022-03-31 NOTE — TELEPHONE ENCOUNTER
Nsure not covered by insurance  Protein will not help with his anemia  Increaase protein will help with wound healing and his low protein level but as already stated above it is not covered by insurance

## 2022-03-31 NOTE — TELEPHONE ENCOUNTER
[employer]  form received on 03/31/2022  to be completed by PCP  Copy made and placed in PCP folder  Forms to be delivered to PCP mailbox at assigned time

## 2022-04-04 ENCOUNTER — TELEPHONE (OUTPATIENT)
Dept: FAMILY MEDICINE CLINIC | Facility: CLINIC | Age: 62
End: 2022-04-04

## 2022-04-04 NOTE — TELEPHONE ENCOUNTER
[employer] form received on 4-4-22  to be completed by PCP  Copy made and placed in PCP folder      Forms to be delivered to PCP mailbox at assigned time

## 2022-04-20 ENCOUNTER — TELEPHONE (OUTPATIENT)
Dept: LAB | Facility: HOSPITAL | Age: 62
End: 2022-04-20

## 2022-04-20 DIAGNOSIS — D64.9 ANEMIA, UNSPECIFIED TYPE: Primary | ICD-10-CM

## 2022-04-22 ENCOUNTER — OFFICE VISIT (OUTPATIENT)
Dept: WOUND CARE | Facility: CLINIC | Age: 62
End: 2022-04-22
Payer: MEDICARE

## 2022-04-22 VITALS
SYSTOLIC BLOOD PRESSURE: 144 MMHG | TEMPERATURE: 96.9 F | RESPIRATION RATE: 14 BRPM | HEART RATE: 58 BPM | DIASTOLIC BLOOD PRESSURE: 86 MMHG

## 2022-04-22 DIAGNOSIS — L89.324 LEFT PERINEAL ISCHIAL PRESSURE ULCER, STAGE IV (HCC): ICD-10-CM

## 2022-04-22 DIAGNOSIS — L89.133 PRESSURE ULCER OF RIGHT LOWER BACK, STAGE 3 (HCC): ICD-10-CM

## 2022-04-22 DIAGNOSIS — L89.134 PRESSURE INJURY OF RIGHT LOWER BACK, STAGE 4 (HCC): ICD-10-CM

## 2022-04-22 DIAGNOSIS — L89.154 STAGE IV PRESSURE ULCER OF SACRAL REGION (HCC): ICD-10-CM

## 2022-04-22 DIAGNOSIS — L89.323: ICD-10-CM

## 2022-04-22 DIAGNOSIS — L89.223 STAGE III PRESSURE ULCER OF LEFT HIP (HCC): Primary | ICD-10-CM

## 2022-04-22 PROCEDURE — 11045 DBRDMT SUBQ TISS EACH ADDL: CPT | Performed by: SURGERY

## 2022-04-22 PROCEDURE — 11042 DBRDMT SUBQ TIS 1ST 20SQCM/<: CPT | Performed by: SURGERY

## 2022-04-22 NOTE — PATIENT INSTRUCTIONS
Orders Placed This Encounter   Procedures    Wound cleansing and dressings     Wound cleansing and dressings       Left buttock:  Cleanse/Irrigate wound with Normal Saline with next dressing change  Cleanse gaby-wound skin with pH balanced soap and water  Apply skin prep to periwound  Apply primary dressing: Aquacel AG rope  Cover with allevyn life  Change dressing three times a week or as needed for drainage      Wound Left Hip  Cleanse/Irrigate wound with normal saline  Apply skin prep to periwound   Apply primary dressing: -allevyn life foam to hip wound and skin tear on hip  Change three times a week or as needed for drainage      R lateral back:  Cleanse with normal saline  Apply primary dressing: Aquacel AG rope  Apply skin prep to periwound  Cover with allevyn border foam   Change three times a week or as needed for drainage      Perinium:  Skin prep to periwound  Aquacel AG rope to wound bed  Extend into left buttock  Cover with allevyn life  Change three times a week or as needed for drainage      HOME CARE  Continue home care services/skilled nursing - 3 x per week (family to do in between), daily visits for one week to pack right back wound      OFF-LOADING  Avoid pressure at wound site  - all wounds, including right back  Wheelchair Cushion  - ROHO cushion  Do Not Sit for Long Periods of Time  - 1 hr max for meals only, otherwise in bed and turn side to side at least  every 3 hours or more frequently  Reposition in regular bed for now at least every 1-2 hours while awake      Follow up in 4 weeks      Today's Wound treatment note: All wounds cleansed with normal saline    Redressed as ordered above     Standing Status:   Future     Standing Expiration Date:   4/22/2023

## 2022-04-22 NOTE — CONSULTS
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  His urinalysis was abnormal and he reports dark urine output  A formal urine culture is pending  He has a history of resistant organisms including pseudomonas, MRSA, and e coli ESBL  Also consider secondary to infected penile prosthesis  Prosthesis is exposed in patient's chronic buttock decubitus  Wound itself does not appear cellulitic  Consider secondary to ileus versus small-bowel obstruction although this is an unlikely source of fever  Chest x-ray was negative for acute cardiopulmonary findings  Blood cultures are pending  Fortunately the patient remains clinically stable and nontoxic  He is currently receiving IV zosyn and is tolerating without difficulty  Will continue for now while we wait for formal culture results   -continue IV zosyn  -check CBC and BMP tomorrow  -follow up blood cultures  -follow up urine culture  -monitor vitals  -supportive care    2  Possible UTI  UA collected from suprapubic tube does appear abnormal and his urine continues to appear dark and cloudy  A formal urine culture is pending  He unfortunately has a 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, MRSA, and e coli ESBL  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  New concern with further erosion as the penile prosthesis is now exposed in the wound base  Unclear if urinary bacteria are playing a part in this erosion    -antibiotic as above  -check CBC and BMP tomorrow  -follow up urine culture  -monitor vitals  -serial suprapubic tube examination and care  -monitor urine output    3  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Exposed implant No answer, left VM.   noted in left buttock decubitus ulcer  Penile implant should be removed  Recommend urology consult  -recommend urology consult for removal of prosthesis    4  Small bowel obstruction verses ileus  I personally reviewed patient's CT of the abdomen and pelvis which was concerning for dilation of his proximal and mid jejunum consistent with a bowel obstruction  He has not had output from his ostomy since 10/26/2019  Two small fluid collections were also noted within the right pelvis which were new when compared to previous pelvic imaging  General surgery has assessed the patient and will have conservative management for now with bowel rest   -serial abdominal exams  -serial ostomy examination and care  -monitor GI symptoms  -monitor stool output  -continue close follow up with general surgery    5  Chronic left buttock decubitus ulceration  POA  With chronic underlying osteomyelitis  Now with exposed penile prosthesis in the wound base  Wound has no purulent drainage and does not appear cellulitic on exam  Wound management is following and have made wound care recommendations  Implant should be removed  Recommend consult with urology   -serial wound exams  -local wound care per wound management team  -continue follow up with the wound management team  -recommend urology consult for removal of prosthesis    6  History of C diff   Patient currently without output in his ostomy  Concern for small bowel obstruction versus ileus  However, he will still require PO vancomycin as he received Zosyn earlier today   -start PO vancomycin, 125mg q12 hours  -monitor stool output  -monitor abdominal symptoms    7  History of resistant organisms including ESBL E coli, MRSA, and Pseudomonas  8  Paraplegia  Thank you for the consult  We will continue to follow along  Above plan was discussed detail with patient and his family at the bedside    Above plan was discussed in detail with Urology PHUONG, Joe holloway notified Dr Jose R Fields  Above plan was discussed in detail with surgical Joce BACA  HISTORY OF PRESENT ILLNESS:  Reason for Consult:  UTI, infection and inflammatory reaction due to implanted penile prosthesis  HPI: Rubina Garcia is a 63y o  year old male who presented to the [facility] Emergency Department on 10/28/2019 with fever and abdominal pain  The patient reports that his pain started yesterday and has been getting progressively worse  States he has poor appetite and has not been eating or drinking much  He is currently feeling very nauseous  The patient states he has a colostomy and a suprapubic catheter  He feels his urine looks darker than usual   He also reported a chronic decubitus ulceration to his left perineum with underlying osteomyelitis  Upon arrival to the ED the patient's temperature was 100 2° with a heart rate of 137  His white blood cell count was elevated at 16 39  Lactic acid was 0 9  Creatinine was 0 86 with a GFR of 96  Urine sample was collected from patient's suprapubic tube and showed large blood, positive nitrates, moderate leukocytes, positive protein, small bilirubin, innumerable WBC, and moderate bacteria  The urine has been sent for formal culture  Blood cultures were sent  He was taken for chest x-ray which was negative for acute cardiopulmonary findings  He was then taken for a CT of the abdomen and pelvis which showed the patient's penile prosthesis protruding into his decubitus ulcer  There was also abdominal and pelvic ascites with concern for two fluid collections within the right pelvis  Patient had dilation of his proximal and mid jejunum likely representative of a small-bowel obstruction  The patient was started on IV Zosyn  He was admitted for additional medical management  Since his admission he has been assessed by general surgery  They recommend conservative treatment of the bowel this time    He has been made NPO and is receiving analgesics and antiemetics  Wound management has assessed the patient and has made wound care recommendations  We have been asked for formal consult for UTI and infection and inflammatory reaction due to implanted penile prosthesis  The patient has a past medical and surgical history significant for hyperlipidemia, detrusor sphincter dyssynergia, chronic cystitis, urge incontinence, paraplegia, cysts of the spinal cord, GERD, type 2 diabetes mellitus, osteomyelitis, R AKA, ED, anemia, right eye blindness, colostomy, urinary frequency, hypertension, hypospadias, neurogenic bladder, lower extremity paralysis, partial paraplegia; sacral ulcer, ischial ulcers, former smoker, multiple flap closure procedures for thigh wounds, penile prosthesis implant, meatotomy PICC placement and removal, complex cystometrogram, bladder surgery, cystoscopy, suprapubic tube creation, and spinal surgery of his lower back  He has no known drug allergies  REVIEW OF SYSTEMS:  Patient reports that he is having a lot of abdominal pain today  States he feels very bloated and full and has tenderness when people press over the middle of his stomach  He states the last time he saw stool in his ostomy pouch was Saturday, 10/26/2019  Reports he has only had gas since then  He has no concerns with his suprapubic catheter site but reports that his urine looks very dark  He reports that his appetite is poor today and that he has not been eating or drinking a lot over the last few days  He reports no new pain in his wound  States [facility] and has been told that his wound has been looking clean  He denies headache and dizziness  He denies cough, shortness of breath, chest pain, congestion, sore throat, runny nose  A complete 12 point system-based review of systems is otherwise negative      PAST MEDICAL HISTORY:  Past Medical History:   Diagnosis Date    Anemia  Blind     r eye    Chronic cystitis     Colostomy in place [facility])     Detrusor sphincter dyssynergia     Diabetes mellitus ([facility] [address] )     Poorly controlled type 2; Last Assessed:  3/18/14    Erectile dysfunction     Frequency of urination     GERD (gastroesophageal reflux disease)     History of diabetes mellitus     History of osteomyelitis     Hx of leg amputation (HCC)     r high upper leg    Hyperlipidemia     Hypertension     Hypospadias     Incomplete bladder emptying     Neurogenic bladder     Paralysis (HCC)     Paraplegia (HCC)     Spinal cord cysts     Ulcer of sacral region [facility])     Urge incontinence      Past Surgical History:   Procedure Laterality Date    AMPUTATION      At hip; Last Assessed:  1/19/16    BLADDER SURGERY      COLON SURGERY      llq ostomy pouch    COLOSTOMY      COMPLEX CYSTOMETROGRAM  2014    CT CYSTOGRAM  9/19/2019    CYSTOSCOPY  2014    IR PICC REPO  9/23/2019    IR SUPRAPUBIC TUBE  9/19/2019    LEG AMPUTATION      MEATOTOMY      PENILE PROSTHESIS IMPLANT  2011    FL ADJ TISS XFER SCALP,EXTREM 10 1-30 SQCM Left 5/1/2017    Procedure: POSTERIOR THIGH V-Y ADMANCEMENT;  Surgeon: Rosina Espinosa MD;  [facility]: [facility];  Service: Plastics    FL MUSCLE-SKIN FLAP,TRUNK Left 5/1/2017    Procedure: FLAP CLOSURE LEFT ISCHIAL WOUND and "RIGHT" ISCHIAL FLAP ADVANCEMENT * DEBRIDEMENT, [place] ;  Surgeon: Rosina Espinosa MD;  [facility];  Service: Plastics    FL MUSCLE-SKIN FLAP,TRUNK Left 9/27/2017    Procedure: gluteal myocutaneous rotational flap, posterior thigh v to y advancement- wound 5 x 2 5 x 8;  Surgeon: Rosina Espinosa MD;  [facility];  Service: Plastics    SPINE SURGERY      Lower back    UROFLOWMETRY SIMPLE / COMPLEX  2014     FAMILY HISTORY:  Non-contributory    SOCIAL HISTORY:  Social History   Social History     Substance and Sexual Activity   Alcohol Use Never    Frequency: Never    Comment: Per [employer]:  Social drinker (Onset date:  11/10/17)     Social History     Substance and Sexual Activity   Drug Use No     Social History     Tobacco Use   Smoking Status Former Smoker    Packs/day: 0 50    Years: 10 00    Pack years: 5 00    Last attempt to quit: Rodolfo Robertson Years since quittin 8   Smokeless Tobacco Never Used   Tobacco Comment    Onset date:  11/10/17     ALLERGIES:  No Known Allergies    MEDICATIONS:  All current active medications have been reviewed  ANTIBIOTICS:  Zosyn 1  Antibiotics 1    PHYSICAL EXAM:  Temp:  [98 1 °F (36 7 °C)-100 7 °F (38 2 °C)] 98 1 °F (36 7 °C)  HR:  [] 102  Resp:  [15-20] 18  BP: ()/(49-80) 90/60  SpO2:  [91 %-100 %] 96 %  Temp (24hrs), Av 4 °F (37 4 °C), Min:98 1 °F (36 7 °C), Max:100 7 °F (38 2 °C)  Current: Temperature: 98 1 °F (36 7 °C)    Intake/Output Summary (Last 24 hours) at 10/28/2019 1246  Last data filed at 10/28/2019 0735  Gross per 24 hour   Intake 3150 ml   Output    Net 3150 ml     General Appearance:  Appearing well, nontoxic, and in no distress  Patient is chronically ill and debilitated     Head:  Normocephalic, without obvious abnormality, atraumatic   Eyes:  Conjunctiva pink and sclera anicteric, both eyes; right eye partial deviation   Nose: Nares normal, mucosa normal, no drainage   Throat: Oropharynx moist without lesions   Neck: Supple, symmetrical, no adenopathy, no tenderness/mass/nodules   Back:   Symmetric, no CVA tenderness; left middle buttock wound with no active drainage, base is pink, edges are friable, no surrounding erythema, covered with an Alleyvn foam; left distal buttock pressure ulcer with exposed penile implant (white tubing) visible at base, wound tissue appears smooth and pink, no purulent discharge, edges are chronically rolled without spreading erythema, with fluffed gauze packing and overlying gauze and paper tape that of no obvious staining   Lungs:   Clear to auscultation bilaterally, respirations unlabored   Chest Wall:  No tenderness or deformity   Heart:  Tachycardic; no murmur, rub or gallop   Abdomen:   Distended and tympanic, tender with palpation over epigastric region, absent bowel sounds; Ostomy intact but pouch is empty; Suprapubic catheter intact   Extremities: No cyanosis or clubbing; R BKA; LLE without edema or erythema  Skin: No rashes or lesions  Lymph nodes: Cervical, supraclavicular nodes normal   Neurologic: Alert and oriented times 3, follows commands with upper extremities, equal hand grasps, symmetric facial movement     LABS, IMAGING, & OTHER STUDIES:  Lab Results:  I have personally reviewed pertinent labs  Results from last 7 days   Lab Units 10/28/19  0400   WBC Thousand/uL 16 39*   HEMOGLOBIN g/dL 10 7*   PLATELETS Thousands/uL 479*     Results from last 7 days   Lab Units 10/28/19  0451   POTASSIUM mmol/L 3 5   CHLORIDE mmol/L 98*   CO2 mmol/L 24   BUN mg/dL 23   CREATININE mg/dL 0 86   EGFR ml/min/1 73sq m 96   CALCIUM mg/dL 9 0   AST U/L 54*  51*   ALT U/L 13  14   ALK PHOS U/L 80  86     Imaging Studies:   I have personally reviewed pertinent imaging study reports and images in PACS  CT ABDOMEN PELVIS WITH CONTRAST 10/28/19 - I personally reviewed this study which showed dilation of the proximal and mid-jejunum with air consistent with bowel obstruction  Small amount of air is also noted in the bladder  Bladder is compressed with suprapubic catheter  Patient has abdominal and pelvic ascites  Concern for 2 fluid collections within the R pelvis  Patient's decubitus ulcer remains large with chronic underlying osteomyelitis  His penile prosthesis is now exposed within the wound  XR CHEST 1 VIEW PORTABLE 10/28/19 - I personally reviewed this study which showed no acute infectious cardiopulmonary findings      Other Studies:   Blood cultures are pending  Urine culture is pending

## 2022-04-22 NOTE — PROGRESS NOTES
Patient ID: Urbano Neal is a 65 y o  male Date of Birth 1960     Chief Complaint  Chief Complaint   Patient presents with    Follow Up Wound Care Visit     hip and buttock wounds       Allergies  Patient has no known allergies  Assessment:    Pressure injury of right lower back, stage 4 (HCC)  The opening started to close down slightly but still has significant depth  Will continue with packing  Left perineal ischial pressure ulcer, stage IV (HCC)  The left hip wound is about the same and has to be in the skin bridge in the center portion  The ischial wound and perineal wounds they all connected under the skin flap  The ischial wound tracks towards the perineum and beyond  It also tracks towards and underneath the hip ulcer  Will attempt to pack the ischial wound towards the hip and pack the perineal wound is well  Consider having re-evaluation by Plastic surgery as he had been scheduled for surgery last summer  Diagnoses and all orders for this visit:    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)  -     Wound cleansing and dressings; Future    Pressure ulcer of right lower back, stage 3 (HCC)  -     Wound cleansing and dressings; Future    Stage IV pressure ulcer of sacral region ([address] )  -     Wound cleansing and dressings; Future    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, stage 3 (HCC)  -     Wound cleansing and dressings; Future    Left perineal ischial pressure ulcer, stage IV (HCC)    Pressure injury of right lower back, stage 4 (HCC)    Other orders  -     Debridement  -     Debridement  -     Debridement  -     Debridement              Debridement   Wound 08/26/20 Buttocks Left    Universal Protocol:  Consent given by: patient  Time out: Immediately prior to procedure a "time out" was called to verify the correct patient, procedure, equipment, support staff and site/side marked as required      Performed by: physician  Debridement type: surgical  Level of debridement: subcutaneous tissue  Pain control: lidocaine 4%  Post-debridement measurements  Length (cm): 5  Width (cm): 2 7  Depth (cm): 1 5  Percent debrided: 100%  Surface Area (cm^2): 13 5  Area debrided (cm^2): 13 5  Volume (cm^3): 20 25  Tissue and other material debrided: subcutaneous tissue  Devitalized tissue debrided: biofilm and slough  Instrument(s) utilized: curette  Procedural pain (0-10): insensate  Post-procedural pain: insensate   Response to treatment: procedure was tolerated well    Debridement   Wound 08/26/20 Hip Left    Universal Protocol:  Consent given by: patient  Time out: Immediately prior to procedure a "time out" was called to verify the correct patient, procedure, equipment, support staff and site/side marked as required  Performed by: physician  Debridement type: surgical  Level of debridement: subcutaneous tissue  Pain control: lidocaine 4%  Post-debridement measurements  Length (cm): 2  Width (cm): 3 3  Depth (cm): 0 4  Percent debrided: 100%  Surface Area (cm^2): 6 6  Area debrided (cm^2): 6 6  Volume (cm^3): 2 64  Tissue and other material debrided: subcutaneous tissue  Devitalized tissue debrided: biofilm and slough  Instrument(s) utilized: curette  Procedural pain (0-10): insensate  Post-procedural pain: insensate   Response to treatment: procedure was tolerated well    Debridement   Wound 07/29/21 Pressure Injury Back Right; Lower; Lateral    Universal Protocol:  Consent given by: patient  Time out: Immediately prior to procedure a "time out" was called to verify the correct patient, procedure, equipment, support staff and site/side marked as required      Performed by: physician  Debridement type: surgical  Level of debridement: subcutaneous tissue  Pain control: lidocaine 4%  Post-debridement measurements  Length (cm): 1 5  Width (cm): 2 3  Depth (cm): 5  Percent debrided: 100%  Surface Area (cm^2): 3 45  Area debrided (cm^2): 3 45  Volume (cm^3): 17 25  Tissue and other material debrided: subcutaneous tissue  Devitalized tissue debrided: biofilm and slough  Instrument(s) utilized: curette  Procedural pain (0-10): insensate  Post-procedural pain: insensate   Response to treatment: procedure was tolerated well    Debridement   Wound 11/05/21 Pressure Injury Perineum    Universal Protocol:  Consent given by: patient  Time out: Immediately prior to procedure a "time out" was called to verify the correct patient, procedure, equipment, support staff and site/side marked as required  Performed by: physician  Debridement type: surgical  Level of debridement: subcutaneous tissue  Pain control: lidocaine 4%  Post-debridement measurements  Length (cm): 2  Width (cm): 2 8  Depth (cm): 1 3  Percent debrided: 100%  Surface Area (cm^2): 5 6  Area debrided (cm^2): 5 6  Volume (cm^3): 7 28  Tissue and other material debrided: subcutaneous tissue  Devitalized tissue debrided: biofilm and slough  Instrument(s) utilized: curette  Procedural pain (0-10): insensate  Post-procedural pain: insensate   Response to treatment: procedure was tolerated well          Plan:     Wound 08/26/20 Buttocks Left (Active)   Wound Image Images linked 04/22/22 1119   Wound Description Pink;Probes to bone 04/22/22 1044   Shelby-wound Assessment Maceration;Scar Tissue 04/22/22 1044   Wound Length (cm) 5 cm 04/22/22 1044   Wound Width (cm) 2 7 cm 04/22/22 1044   Wound Depth (cm) 1 4 cm 04/22/22 1044   Wound Surface Area (cm^2) 13 5 cm^2 04/22/22 1044   Wound Volume (cm^3) 18 9 cm^3 04/22/22 1044   Calculated Wound Volume (cm^3) 18 9 cm^3 04/22/22 1044   Change in Wound Size % 34 38 04/22/22 1044   Tunneling in depth located at 5 6 @ 10:00 04/22/22 1044   Undermining 1 4 04/22/22 1044   Undermining is depth extending from 3-11 04/22/22 1044   Drainage Amount Large 04/22/22 1044   Drainage Description Serous; Tan 04/22/22 1044   Non-staged Wound Description Full thickness 04/22/22 1044   Treatments Irrigation with NSS 04/22/22 1044       Wound 08/26/20 Hip Left (Active)   Wound Image Images linked 04/22/22 1119   Wound Description Beefy red;Pink 04/22/22 1045   Shelby-wound Assessment Hyperpigmented; Intact 04/22/22 1045   Wound Length (cm) 2 cm 04/22/22 1045   Wound Width (cm) 3 3 cm 04/22/22 1045   Wound Depth (cm) 0 3 cm 04/22/22 1045   Wound Surface Area (cm^2) 6 6 cm^2 04/22/22 1045   Wound Volume (cm^3) 1 98 cm^3 04/22/22 1045   Calculated Wound Volume (cm^3) 1 98 cm^3 04/22/22 1045   Change in Wound Size % -760 87 04/22/22 1045   Undermining 0 3 04/22/22 1045   Undermining is depth extending from 12-12 04/22/22 1045   Drainage Amount Moderate 04/22/22 1045   Drainage Description Serosanguineous; Alva 04/22/22 1045   Non-staged Wound Description Full thickness 04/22/22 1045   Treatments Irrigation with NSS 04/22/22 1045       Wound 07/29/21 Pressure Injury Back Right; Lower; Lateral (Active)   Wound Image Images linked 04/22/22 1120   Wound Description Pink;Slough 04/22/22 1047   Shelby-wound Assessment Intact 04/22/22 1047   Wound Length (cm) 1 5 cm 04/22/22 1047   Wound Width (cm) 2 3 cm 04/22/22 1047   Wound Depth (cm) 1 5 cm 04/22/22 1047   Wound Surface Area (cm^2) 3 45 cm^2 04/22/22 1047   Wound Volume (cm^3) 5 175 cm^3 04/22/22 1047   Calculated Wound Volume (cm^3) 5 18 cm^3 04/22/22 1047   Change in Wound Size % 70 16 04/22/22 1047   Tunneling in depth located at 5 9 @ 11:00 04/22/22 1047   Drainage Amount Copious 04/22/22 1047   Drainage Description Yellow 04/22/22 1047   Non-staged Wound Description Full thickness 04/22/22 1047   Treatments Irrigation with NSS 04/22/22 1047       Wound 11/05/21 Pressure Injury Perineum (Active)   Wound Image Images linked 04/22/22 1120   Wound Description Pink 04/22/22 1048   Shelby-wound Assessment Hyperpigmented;Scar Tissue; Intact 04/22/22 1048   Wound Length (cm) 2 cm 04/22/22 1048   Wound Width (cm) 2 8 cm 04/22/22 1048   Wound Depth (cm) 1 2 cm 04/22/22 1048   Wound Surface Area (cm^2) 5 6 cm^2 04/22/22 1048 Wound Volume (cm^3) 6 72 cm^3 04/22/22 1048   Calculated Wound Volume (cm^3) 6 72 cm^3 04/22/22 1048   Change in Wound Size % -6620 04/22/22 1048   Undermining 2 04/22/22 1048   Undermining is depth extending from 12-12 04/22/22 1048   Drainage Amount Large 04/22/22 1048   Drainage Description Yellow; Tan 04/22/22 1048   Non-staged Wound Description Full thickness 04/22/22 1048   Treatments Irrigation with NSS 04/22/22 1048       Wound 08/26/20 Buttocks Left (Active)   Date First Assessed/Time First Assessed: 08/26/20 1056   Primary Wound Type: Pressure Injury  Location: Buttocks  Wound Location Orientation: Left       Wound 08/26/20 Hip Left (Active)   Date First Assessed/Time First Assessed: 08/26/20 1057   Primary Wound Type: Pressure Injury  Location: Hip  Wound Location Orientation: Left       Wound 07/29/21 Pressure Injury Back Right; Lower; Lateral (Active)   Date First Assessed: 07/29/21   Primary Wound Type: Pressure Injury  Location: Back  Wound Location Orientation: Right; Lower; Lateral       Wound 11/05/21 Pressure Injury Perineum (Active)   Date First Assessed/Time First Assessed: 11/05/21 1059   Primary Wound Type: Pressure Injury  Location: Perineum       [REMOVED] Wound 08/26/19 Buttocks Left;Distal;Lateral (Removed)   Resolved Date/Resolved Time: 08/26/20 1056  Date First Assessed/Time First Assessed: 08/26/19 1130   Pre-Existing Wound: Yes  Location: Buttocks  Wound Location Orientation: Left;Distal;Lateral  Wound Description (Comments): Deep ischial wound       [REMOVED] Wound 09/16/19 Buttocks Right;Distal (Removed)   Resolved Date/Resolved Time: 08/26/20 1055  Date First Assessed/Time First Assessed: 09/16/19 1657   Pre-Existing Wound: Yes  Location: Buttocks  Wound Location Orientation: Right;Distal       [REMOVED] Wound 10/28/19 Knee Left (Removed)   Resolved Date/Resolved Time: 08/26/20 1055  Date First Assessed/Time First Assessed: 10/28/19 0657   Pre-Existing Wound: Yes  Location: Knee  Wound Location Orientation: Left  Wound Description (Comments): round black  Dressing Status: Open to air       [REMOVED] Wound 10/28/19 Buttocks Left;Mid (Removed)   Resolved Date/Resolved Time: 08/26/20 1056  Date First Assessed/Time First Assessed: 10/28/19 1108   Pre-Existing Wound: Yes  Location: Buttocks  Wound Location Orientation: Left;Mid  Wound Description (Comments): Stage 2 vs traumatic injury       [REMOVED] Wound 11/01/19 Other (Comment) N/A (Removed)   Resolved Date/Resolved Time: 08/26/20 1055  Date First Assessed/Time First Assessed: 11/01/19 1640   Location: Other (Comment)  Wound Location Orientation: N/A  Wound Description (Comments): scrotum dressed with exofin       Subjective:        Mr Monica England is a 36-year-old gentleman with paraplegia and history of multiple pressure wounds with 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  He had been rescheduled for a debridement and flap closure by plastics in August but developed a new wound on his right mid to lower back chest wall  This wound probes deep and has been closed on the outside but the tract has not been improving  He had a CT scan that shows a deep tracking to the muscle but no fistulization to the internal thoracic cavity  02/04/2022 he returns now for re-evaluation  He still has had visiting nurses but has not been seen in the [address] [facility] since November  He denies any change or complaint    03/11/2022 no changes since been seen last   No new complaints  04/22/2022 no issues  No changes  The following portions of the patient's history were reviewed and updated as appropriate: allergies, current medications, past family history, past medical history, past social history, past surgical history and problem list     Review of Systems   Constitutional: Negative for chills and fever  HENT: Negative for ear pain and sore throat  Eyes: Negative for pain and visual disturbance     Respiratory: Negative for cough and shortness of breath  Cardiovascular: Negative for chest pain and palpitations  Gastrointestinal: Negative for abdominal pain and vomiting  Skin: Negative for color change and rash  Neurological: Positive for weakness and numbness  Psychiatric/Behavioral: Negative for agitation and behavioral problems  All other systems reviewed and are negative  Objective:       Wound 08/26/20 Buttocks Left (Active)   Wound Image Images linked 04/22/22 1119   Wound Description Pink;Probes to bone 04/22/22 1044   Shelby-wound Assessment Maceration;Scar Tissue 04/22/22 1044   Wound Length (cm) 5 cm 04/22/22 1044   Wound Width (cm) 2 7 cm 04/22/22 1044   Wound Depth (cm) 1 4 cm 04/22/22 1044   Wound Surface Area (cm^2) 13 5 cm^2 04/22/22 1044   Wound Volume (cm^3) 18 9 cm^3 04/22/22 1044   Calculated Wound Volume (cm^3) 18 9 cm^3 04/22/22 1044   Change in Wound Size % 34 38 04/22/22 1044   Tunneling in depth located at 5 6 @ 10:00 04/22/22 1044   Undermining 1 4 04/22/22 1044   Undermining is depth extending from 3-11 04/22/22 1044   Drainage Amount Large 04/22/22 1044   Drainage Description Serous; Tan 04/22/22 1044   Non-staged Wound Description Full thickness 04/22/22 1044   Treatments Irrigation with NSS 04/22/22 1044       Wound 08/26/20 Hip Left (Active)   Wound Image Images linked 04/22/22 1119   Wound Description Beefy red;Pink 04/22/22 1045   Shelby-wound Assessment Hyperpigmented; Intact 04/22/22 1045   Wound Length (cm) 2 cm 04/22/22 1045   Wound Width (cm) 3 3 cm 04/22/22 1045   Wound Depth (cm) 0 3 cm 04/22/22 1045   Wound Surface Area (cm^2) 6 6 cm^2 04/22/22 1045   Wound Volume (cm^3) 1 98 cm^3 04/22/22 1045   Calculated Wound Volume (cm^3) 1 98 cm^3 04/22/22 1045   Change in Wound Size % -760 87 04/22/22 1045   Undermining 0 3 04/22/22 1045   Undermining is depth extending from 12-12 04/22/22 1045   Drainage Amount Moderate 04/22/22 1045   Drainage Description Serosanguineous; Alva 04/22/22 1045   Non-staged Wound Description Full thickness 04/22/22 1045   Treatments Irrigation with NSS 04/22/22 1045       Wound 07/29/21 Pressure Injury Back Right; Lower; Lateral (Active)   Wound Image Images linked 04/22/22 1120   Wound Description Pink;Slough 04/22/22 1047   Shelby-wound Assessment Intact 04/22/22 1047   Wound Length (cm) 1 5 cm 04/22/22 1047   Wound Width (cm) 2 3 cm 04/22/22 1047   Wound Depth (cm) 1 5 cm 04/22/22 1047   Wound Surface Area (cm^2) 3 45 cm^2 04/22/22 1047   Wound Volume (cm^3) 5 175 cm^3 04/22/22 1047   Calculated Wound Volume (cm^3) 5 18 cm^3 04/22/22 1047   Change in Wound Size % 70 16 04/22/22 1047   Tunneling in depth located at 5 9 @ 11:00 04/22/22 1047   Drainage Amount Copious 04/22/22 1047   Drainage Description Yellow 04/22/22 1047   Non-staged Wound Description Full thickness 04/22/22 1047   Treatments Irrigation with NSS 04/22/22 1047       Wound 11/05/21 Pressure Injury Perineum (Active)   Wound Image Images linked 04/22/22 1120   Wound Description Pink 04/22/22 1048   Shelby-wound Assessment Hyperpigmented;Scar Tissue; Intact 04/22/22 1048   Wound Length (cm) 2 cm 04/22/22 1048   Wound Width (cm) 2 8 cm 04/22/22 1048   Wound Depth (cm) 1 2 cm 04/22/22 1048   Wound Surface Area (cm^2) 5 6 cm^2 04/22/22 1048   Wound Volume (cm^3) 6 72 cm^3 04/22/22 1048   Calculated Wound Volume (cm^3) 6 72 cm^3 04/22/22 1048   Change in Wound Size % -6620 04/22/22 1048   Undermining 2 04/22/22 1048   Undermining is depth extending from 12-12 04/22/22 1048   Drainage Amount Large 04/22/22 1048   Drainage Description Yellow; Tan 04/22/22 1048   Non-staged Wound Description Full thickness 04/22/22 1048   Treatments Irrigation with NSS 04/22/22 1048       /86   Pulse 58   Temp (!) 96 9 °F (36 1 °C)   Resp 14     Physical Exam  Vitals and nursing note reviewed  Exam conducted with a chaperone present  Constitutional:       Appearance: Normal appearance     Cardiovascular:      Rate and Rhythm: Normal rate and regular rhythm  Pulmonary:      Effort: Pulmonary effort is normal       Breath sounds: Normal breath sounds  Abdominal:      General: There is no distension  Palpations: Abdomen is soft  There is no mass  Tenderness: There is no abdominal tenderness  Comments: Ostomy   Musculoskeletal:      Comments: Paraplegia  Right hip disarticulation and removal   Skin:     General: Skin is warm and dry  Comments: See complete wound assessment   Neurological:      Mental Status: He is alert  Psychiatric:         Mood and Affect: Mood normal          Behavior: Behavior normal            Wound Instructions:  Orders Placed This Encounter   Procedures    Wound cleansing and dressings     Wound cleansing and dressings       Left buttock:  Cleanse/Irrigate wound with Normal Saline with next dressing change  Cleanse gaby-wound skin with pH balanced soap and water  Apply skin prep to periwound  Apply primary dressing: Aquacel AG rope  Cover with allevyn life  Change dressing three times a week or as needed for drainage      Wound Left Hip  Cleanse/Irrigate wound with normal saline  Apply skin prep to periwound   Apply primary dressing: -allevyn life foam to hip wound and skin tear on hip  Change three times a week or as needed for drainage      R lateral back:  Cleanse with normal saline  Apply primary dressing: Aquacel AG rope  Apply skin prep to periwound  Cover with allevyn border foam   Change three times a week or as needed for drainage      Perinium:  Skin prep to periwound  Aquacel AG rope to wound bed  Extend into left buttock  Cover with allevyn life  Change three times a week or as needed for drainage      HOME CARE  Continue home care services/skilled nursing - 3 x per week (family to do in between), daily visits for one week to pack right back wound      OFF-LOADING  Avoid pressure at wound site   - all wounds, including right back  Wheelchair Cushion  - ROHO cushion  Do Not Sit for Long Periods of Time  - 1 hr max for meals only, otherwise in bed and turn side to side at least  every 3 hours or more frequently  Reposition in regular bed for now at least every 1-2 hours while awake      Follow up in 4 weeks      Today's Wound treatment note: All wounds cleansed with normal saline  Redressed as ordered above     Standing Status:   Future     Standing Expiration Date:   4/22/2023    Debridement     This order was created via procedure documentation    Debridement     This order was created via procedure documentation    Debridement     This order was created via procedure documentation    Debridement     This order was created via procedure documentation        Diagnosis ICD-10-CM Associated Orders   1  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 ([facility])  C50 322 Wound cleansing and dressings   2  Pressure ulcer of right lower back, stage 3 ([facility])  L89 133 Wound cleansing and dressings   3  Stage IV pressure ulcer of sacral region [facility])  L89 154 Wound cleansing and dressings   4  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, stage 3 ([facility])  L89 323 Wound cleansing and dressings   5  Left perineal ischial pressure ulcer, stage IV ([facility])  L89 324    6   Pressure injury of right lower back, stage 4 ([facility])  L89 134

## 2022-04-22 NOTE — ASSESSMENT & PLAN NOTE
The opening started to close down slightly but still has significant depth  Will continue with packing

## 2022-04-22 NOTE — ASSESSMENT & PLAN NOTE
The left hip wound is about the same and has to be in the skin bridge in the center portion  The ischial wound and perineal wounds they all connected under the skin flap  The ischial wound tracks towards the perineum and beyond  It also tracks towards and underneath the hip ulcer  Will attempt to pack the ischial wound towards the hip and pack the perineal wound is well  Consider having re-evaluation by Plastic surgery as he had been scheduled for surgery last summer

## 2022-05-02 ENCOUNTER — TRANSCRIBE ORDERS (OUTPATIENT)
Dept: LAB | Facility: HOSPITAL | Age: 62
End: 2022-05-02

## 2022-05-02 ENCOUNTER — TELEPHONE (OUTPATIENT)
Dept: FAMILY MEDICINE CLINIC | Facility: CLINIC | Age: 62
End: 2022-05-02

## 2022-05-02 DIAGNOSIS — L89.324 PRESSURE ULCER OF LEFT BUTTOCK, STAGE 4 (HCC): Primary | ICD-10-CM

## 2022-05-02 NOTE — TELEPHONE ENCOUNTER
Pt called requesting lab result from [facility] that he was order from the office  I called back requesting more information  pt states he is calling to the lab to check what is going on

## 2022-05-03 DIAGNOSIS — G89.29 CHRONIC LOW BACK PAIN WITHOUT SCIATICA, UNSPECIFIED BACK PAIN LATERALITY: ICD-10-CM

## 2022-05-03 DIAGNOSIS — E11.9 TYPE 2 DIABETES MELLITUS WITHOUT COMPLICATION, WITHOUT LONG-TERM CURRENT USE OF INSULIN (HCC): ICD-10-CM

## 2022-05-03 DIAGNOSIS — M54.50 CHRONIC LOW BACK PAIN WITHOUT SCIATICA, UNSPECIFIED BACK PAIN LATERALITY: ICD-10-CM

## 2022-05-03 DIAGNOSIS — E46 PROTEIN MALNUTRITION (HCC): ICD-10-CM

## 2022-05-03 DIAGNOSIS — L89.324 STAGE IV PRESSURE ULCER OF LEFT BUTTOCK (HCC): ICD-10-CM

## 2022-05-04 ENCOUNTER — TELEPHONE (OUTPATIENT)
Dept: FAMILY MEDICINE CLINIC | Facility: CLINIC | Age: 62
End: 2022-05-04

## 2022-05-04 RX ORDER — LANCETS
EACH MISCELLANEOUS DAILY
Qty: 100 EACH | Refills: 0 | Status: SHIPPED | OUTPATIENT
Start: 2022-05-04 | End: 2022-06-27 | Stop reason: SDUPTHER

## 2022-05-04 RX ORDER — IBUPROFEN 800 MG/1
800 TABLET ORAL EVERY 8 HOURS PRN
Qty: 60 TABLET | Refills: 0 | Status: SHIPPED | OUTPATIENT
Start: 2022-05-04 | End: 2022-06-27 | Stop reason: SDUPTHER

## 2022-05-04 RX ORDER — OXYCODONE HYDROCHLORIDE 20 MG/1
20 TABLET ORAL 3 TIMES DAILY PRN
Qty: 90 TABLET | Refills: 0 | Status: SHIPPED | OUTPATIENT
Start: 2022-05-04 | End: 2022-05-29 | Stop reason: SDUPTHER

## 2022-05-04 RX ORDER — BLOOD SUGAR DIAGNOSTIC
1 STRIP MISCELLANEOUS DAILY
Qty: 100 EACH | Refills: 0 | Status: SHIPPED | OUTPATIENT
Start: 2022-05-04 | End: 2022-06-27 | Stop reason: SDUPTHER

## 2022-05-04 RX ORDER — ASPIRIN 81 MG/1
81 TABLET ORAL DAILY
Qty: 90 TABLET | Refills: 0 | Status: SHIPPED | OUTPATIENT
Start: 2022-05-04 | End: 2022-06-27 | Stop reason: SDUPTHER

## 2022-05-04 NOTE — TELEPHONE ENCOUNTER
PCP SIGNATURE NEEDED FOR accu-check fastclix lancet drum FORM RECEIVED VIA FAX AND PLACED IN PCP FOLDER TO BE DELIVERED AT ASSIGNED TIMES

## 2022-05-13 ENCOUNTER — TELEPHONE (OUTPATIENT)
Dept: FAMILY MEDICINE CLINIC | Facility: CLINIC | Age: 62
End: 2022-05-13

## 2022-05-13 NOTE — TELEPHONE ENCOUNTER
[employer]  form received on 05/13/22  to be completed by PCP  Copy made and placed in PCP folder  Forms to be delivered to PCP mailbox at assigned time      Work Order # [id number]      NOTE: A blank copy has been made and placed at  copy bin, under the letter M

## 2022-05-24 ENCOUNTER — HOME HEALTH ADMISSION (OUTPATIENT)
Dept: HOME HEALTH SERVICES | Facility: HOME HEALTHCARE | Age: 62
End: 2022-05-24
Payer: MEDICARE

## 2022-05-25 ENCOUNTER — HOME CARE VISIT (OUTPATIENT)
Dept: HOME HEALTH SERVICES | Facility: HOME HEALTHCARE | Age: 62
End: 2022-05-25
Payer: MEDICARE

## 2022-05-25 PROCEDURE — 10330081 VN NO-PAY CLAIM PROCEDURE

## 2022-05-26 VITALS — DIASTOLIC BLOOD PRESSURE: 70 MMHG | SYSTOLIC BLOOD PRESSURE: 120 MMHG | TEMPERATURE: 97.6 F | HEART RATE: 58 BPM

## 2022-05-27 ENCOUNTER — OFFICE VISIT (OUTPATIENT)
Dept: WOUND CARE | Facility: CLINIC | Age: 62
End: 2022-05-27
Payer: MEDICARE

## 2022-05-27 ENCOUNTER — APPOINTMENT (OUTPATIENT)
Dept: LAB | Facility: CLINIC | Age: 62
End: 2022-05-27
Payer: MEDICARE

## 2022-05-27 ENCOUNTER — TELEPHONE (OUTPATIENT)
Dept: FAMILY MEDICINE CLINIC | Facility: CLINIC | Age: 62
End: 2022-05-27

## 2022-05-27 VITALS
RESPIRATION RATE: 18 BRPM | TEMPERATURE: 98.3 F | SYSTOLIC BLOOD PRESSURE: 128 MMHG | HEART RATE: 60 BPM | DIASTOLIC BLOOD PRESSURE: 84 MMHG

## 2022-05-27 DIAGNOSIS — E11.22 CONTROLLED TYPE 2 DIABETES MELLITUS WITH STAGE 1 CHRONIC KIDNEY DISEASE, UNSPECIFIED WHETHER LONG TERM INSULIN USE (HCC): ICD-10-CM

## 2022-05-27 DIAGNOSIS — L89.223 STAGE III PRESSURE ULCER OF LEFT HIP (HCC): ICD-10-CM

## 2022-05-27 DIAGNOSIS — D64.9 ANEMIA, UNSPECIFIED TYPE: ICD-10-CM

## 2022-05-27 DIAGNOSIS — L89.133 PRESSURE ULCER OF RIGHT LOWER BACK, STAGE 3 (HCC): ICD-10-CM

## 2022-05-27 DIAGNOSIS — N18.1 CONTROLLED TYPE 2 DIABETES MELLITUS WITH STAGE 1 CHRONIC KIDNEY DISEASE, UNSPECIFIED WHETHER LONG TERM INSULIN USE (HCC): ICD-10-CM

## 2022-05-27 DIAGNOSIS — S31.829A: Primary | ICD-10-CM

## 2022-05-27 DIAGNOSIS — L89.324 LEFT PERINEAL ISCHIAL PRESSURE ULCER, STAGE IV (HCC): ICD-10-CM

## 2022-05-27 DIAGNOSIS — G82.20 PARAPLEGIC SPINAL PARALYSIS (HCC): ICD-10-CM

## 2022-05-27 DIAGNOSIS — L89.324 DECUBITUS ULCER OF ISCHIUM, LEFT, STAGE IV (HCC): ICD-10-CM

## 2022-05-27 LAB — HEMOCCULT STL QL IA: POSITIVE

## 2022-05-27 PROCEDURE — 11042 DBRDMT SUBQ TIS 1ST 20SQCM/<: CPT | Performed by: SURGERY

## 2022-05-27 PROCEDURE — G0328 FECAL BLOOD SCRN IMMUNOASSAY: HCPCS

## 2022-05-27 NOTE — PATIENT INSTRUCTIONS
Orders Placed This Encounter   Procedures    Wound cleansing and dressings     Wound cleansing and dressings          Left buttock:  Cleanse/Irrigate wound with Normal Saline with next dressing change  Cleanse gaby-wound skin with pH balanced soap and water  Apply skin prep to periwound  Apply primary dressing: Aquacel AG rope  Cover with allevyn life  Change dressing three times a week or as needed for drainage  Wound Left Hip  Cleanse/Irrigate wound with normal saline  Apply skin prep to periwound   Apply primary dressing: -allevyn life foam to hip wound and skin tear on hip  Change three times a week or as needed for drainage  R lateral back:  Cleanse with normal saline  Apply primary dressing: Aquacel AG rope  Apply skin prep to periwound  Cover with allevyn border foam   Change three times a week or as needed for drainage  Perinium:  Skin prep to periwound  Aquacel AG rope to wound bed  Extend into left buttock  Cover with allevyn life  Change three times a week or as needed for drainage  Left Proximal Buttock Skintear:  Cleanse with normal saline solution  Apply dermagran to wound  Cover with 4x4 gauze  Secure with tape  Change dressing 3x/week and as needed for soilage/dislodgement     HOME CARE  Continue home care services/skilled nursing - 3 x per week (family to do in between), daily visits for one week to pack right back wound      OFF-LOADING  Avoid pressure at wound site  - all wounds, including right back  Wheelchair Cushion  - ROHO cushion  Do Not Sit for Long Periods of Time  - 1 hr max for meals only, otherwise in bed and turn side to side at least  every 3 hours or more frequently  Reposition in regular bed for now at least every 1-2 hours while awake  Follow up in 6 weeks  Today's Wound treatment note: All wounds cleansed with normal saline    Redressed as ordered above     Standing Status:   Future     Standing Expiration Date:   5/27/2023

## 2022-05-27 NOTE — PROGRESS NOTES
Patient ID: Edna Mena is a 65 y o  male Date of Birth 1960     Chief Complaint  Chief Complaint   Patient presents with    Follow Up Wound Care Visit     Stage 3 pressure ulcer of left hip ([address] )       Allergies  Patient has no known allergies  Assessment:    Pressure ulcer of right lower back, stage 3 (HCC)  The wound is about the same but still tracks significant amount  Continue with packing  Left perineal ischial pressure ulcer, stage IV (HCC)  The wounds around the perineum track towards the left ischium and buttock and 1 continue with wound underneath the skin bridge  Continue the same wound care  Discussed re-evaluation by Plastic surgery but he does not want to be in the hospital until he feels that COVID has waned more  I will see him back in 4-6 week       Diagnoses and all orders for this visit:    Open wound of buttock, left, initial encounter  -     Wound cleansing and dressings; Future    Pressure ulcer of right lower back, stage 3 (HCC)  -     Wound cleansing and dressings; Future  -     Debridement    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 ([facility])  -     Wound cleansing and dressings; Future    Left perineal ischial pressure ulcer, stage IV (HCC)  -     Wound cleansing and dressings; Future    Controlled type 2 diabetes mellitus with stage 1 chronic kidney disease, unspecified whether long term insulin use ([facility])    Paraplegic spinal paralysis ([facility])    Decubitus ulcer of ischium, left, stage IV ([address] )  -     Debridement              Debridement   Wound 08/26/20 Buttocks Left    Universal Protocol:  Consent given by: patient  Time out: Immediately prior to procedure a "time out" was called to verify the correct patient, procedure, equipment, support staff and site/side marked as required      Performed by: physician  Debridement type: surgical  Level of debridement: subcutaneous tissue  Pain control: lidocaine 4%  Post-debridement measurements  Length (cm): 4 3  Width (cm): 2 5  Depth (cm): 1 2  Percent debrided: 100%  Surface Area (cm^2): 10 75  Area debrided (cm^2): 10 75  Volume (cm^3): 12 9  Tissue and other material debrided: subcutaneous tissue  Devitalized tissue debrided: biofilm and slough  Instrument(s) utilized: curette  Procedural pain (0-10): insensate  Post-procedural pain: insensate   Response to treatment: procedure was tolerated well    Debridement   Wound 07/29/21 Pressure Injury Back Right; Lower; Lateral    Universal Protocol:  Consent given by: patient  Time out: Immediately prior to procedure a "time out" was called to verify the correct patient, procedure, equipment, support staff and site/side marked as required  Performed by: physician  Debridement type: surgical  Level of debridement: subcutaneous tissue  Pain control: lidocaine 4%  Post-debridement measurements  Length (cm): 2 1  Width (cm): 1 8  Depth (cm): 2  Percent debrided: 100%  Surface Area (cm^2): 3 78  Area debrided (cm^2): 3 78  Volume (cm^3): 7 56  Tissue and other material debrided: subcutaneous tissue  Devitalized tissue debrided: biofilm and slough  Instrument(s) utilized: curette  Procedural pain (0-10): insensate  Post-procedural pain: insensate   Response to treatment: procedure was tolerated well          Plan:     Wound 08/26/20 Buttocks Left (Active)   Wound Image Images linked 05/27/22 1026   Wound Description Pink;Probes to bone; Other (Comment) (communicates to the peineal wound) 05/27/22 0956   Shelby-wound Assessment Maceration;Scar Tissue 05/27/22 0956   Wound Length (cm) 4 3 cm 05/27/22 0956   Wound Width (cm) 2 5 cm 05/27/22 0956   Wound Depth (cm) 1 3 cm 05/27/22 0956   Wound Surface Area (cm^2) 10 75 cm^2 05/27/22 0956   Wound Volume (cm^3) 13 975 cm^3 05/27/22 0956   Calculated Wound Volume (cm^3) 13 98 cm^3 05/27/22 0956   Change in Wound Size % 51 46 05/27/22 0956   Drainage Amount Large 05/27/22 0956   Drainage Description Alva 05/27/22 0956   Patient Tolerance Tolerated well 05/27/22 0956 Dressing Status Removed 05/27/22 0956       Wound 08/26/20 Hip Left (Active)   Wound Image Images linked 05/27/22 1026   Wound Description Beefy red;Pink 05/27/22 0954   Shelby-wound Assessment Scar Tissue; Hyperpigmented 05/27/22 0954   Wound Length (cm) 2 1 cm 05/27/22 0954   Wound Width (cm) 1 cm 05/27/22 0954   Wound Depth (cm) 0 1 cm 05/27/22 0954   Wound Surface Area (cm^2) 2 1 cm^2 05/27/22 0954   Wound Volume (cm^3) 0 21 cm^3 05/27/22 0954   Calculated Wound Volume (cm^3) 0 21 cm^3 05/27/22 0954   Change in Wound Size % 8 7 05/27/22 0954   Drainage Amount Large 05/27/22 0954   Drainage Description Serosanguineous 05/27/22 0954   Patient Tolerance Tolerated well 05/27/22 0954   Dressing Status Removed 05/27/22 0954       Wound 07/29/21 Pressure Injury Back Right; Lower; Lateral (Active)   Wound Image Images linked 05/27/22 1023   Wound Description Pink 05/27/22 0950   Shelby-wound Assessment Maceration; Intact 05/27/22 0950   Wound Length (cm) 2 1 cm 05/27/22 0950   Wound Width (cm) 1 8 cm 05/27/22 0950   Wound Depth (cm) 1 6 cm 05/27/22 0950   Wound Surface Area (cm^2) 3 78 cm^2 05/27/22 0950   Wound Volume (cm^3) 6 048 cm^3 05/27/22 0950   Calculated Wound Volume (cm^3) 6 05 cm^3 05/27/22 0950   Change in Wound Size % 65 15 05/27/22 0950   Tunneling in depth located at 6 6 @ 11oclock 05/27/22 0950   Drainage Amount Copious 05/27/22 0950   Drainage Description Alva 05/27/22 0950   Patient Tolerance Tolerated well 05/27/22 0950   Dressing Status Removed 05/27/22 0950       Wound 11/05/21 Pressure Injury Perineum (Active)   Wound Image Images linked 05/27/22 1026   Wound Description Pink (comunnicates to left buttock wound at 9 oclock) 05/27/22 0959   Shelby-wound Assessment Scar Tissue; Hyperpigmented; Intact 05/27/22 0959   Wound Length (cm) 2 5 cm 05/27/22 0959   Wound Width (cm) 2 8 cm 05/27/22 0959   Wound Depth (cm) 1 4 cm 05/27/22 0959   Wound Surface Area (cm^2) 7 cm^2 05/27/22 0959   Wound Volume (cm^3) 9 8 cm^3 05/27/22 0959   Calculated Wound Volume (cm^3) 9 8 cm^3 05/27/22 0959   Change in Wound Size % -9700 05/27/22 0959   Tunneling in depth located at 8 4cm @ 12 oclock 05/27/22 0959   Drainage Amount Large 05/27/22 0959   Drainage Description Alva 05/27/22 0959   Patient Tolerance Tolerated well 05/27/22 0959   Dressing Status Removed 05/27/22 0959       Wound 05/27/22 Skin Tear Buttocks Left;Proximal (Active)   Wound Image Images linked 05/27/22 0947   Wound Description Pink 05/27/22 0954   Shelby-wound Assessment Scar Tissue 05/27/22 0954   Wound Length (cm) 0 7 cm 05/27/22 0954   Wound Width (cm) 0 5 cm 05/27/22 0954   Wound Depth (cm) 0 1 cm 05/27/22 0954   Wound Surface Area (cm^2) 0 35 cm^2 05/27/22 0954   Wound Volume (cm^3) 0 035 cm^3 05/27/22 0954   Calculated Wound Volume (cm^3) 0 04 cm^3 05/27/22 0954   Drainage Amount Scant 05/27/22 0954   Drainage Description Serous 05/27/22 0954   Patient Tolerance Tolerated well 05/27/22 0954   Dressing Status Removed 05/27/22 0954       Wound 08/26/20 Buttocks Left (Active)   Date First Assessed/Time First Assessed: 08/26/20 1056   Primary Wound Type: Pressure Injury  Location: Buttocks  Wound Location Orientation: Left       Wound 08/26/20 Hip Left (Active)   Date First Assessed/Time First Assessed: 08/26/20 1057   Primary Wound Type: Pressure Injury  Location: Hip  Wound Location Orientation: Left       Wound 07/29/21 Pressure Injury Back Right; Lower; Lateral (Active)   Date First Assessed: 07/29/21   Primary Wound Type: Pressure Injury  Location: Back  Wound Location Orientation: Right; Lower; Lateral       Wound 11/05/21 Pressure Injury Perineum (Active)   Date First Assessed/Time First Assessed: 11/05/21 1059   Primary Wound Type: Pressure Injury  Location: Perineum       Wound 05/27/22 Skin Tear Buttocks Left;Proximal (Active)   Date First Assessed/Time First Assessed: 05/27/22 0946   Primary Wound Type: Skin Tear  Location: Buttocks  Wound Location Orientation: Left;Proximal       [REMOVED] Wound 08/26/19 Buttocks Left;Distal;Lateral (Removed)   Resolved Date/Resolved Time: 08/26/20 1056  Date First Assessed/Time First Assessed: 08/26/19 1130   Pre-Existing Wound: Yes  Location: Buttocks  Wound Location Orientation: Left;Distal;Lateral  Wound Description (Comments): Deep ischial wound       [REMOVED] Wound 09/16/19 Buttocks Right;Distal (Removed)   Resolved Date/Resolved Time: 08/26/20 1055  Date First Assessed/Time First Assessed: 09/16/19 1657   Pre-Existing Wound: Yes  Location: Buttocks  Wound Location Orientation: Right;Distal       [REMOVED] Wound 10/28/19 Knee Left (Removed)   Resolved Date/Resolved Time: 08/26/20 1055  Date First Assessed/Time First Assessed: 10/28/19 0657   Pre-Existing Wound: Yes  Location: Knee  Wound Location Orientation: Left  Wound Description (Comments): round black  Dressing Status: Open to air       [REMOVED] Wound 10/28/19 Buttocks Left;Mid (Removed)   Resolved Date/Resolved Time: 08/26/20 1056  Date First Assessed/Time First Assessed: 10/28/19 1108   Pre-Existing Wound: Yes  Location: Buttocks  Wound Location Orientation: Left;Mid  Wound Description (Comments): Stage 2 vs traumatic injury       [REMOVED] Wound 11/01/19 Other (Comment) N/A (Removed)   Resolved Date/Resolved Time: 08/26/20 1055  Date First Assessed/Time First Assessed: 11/01/19 1640   Location: Other (Comment)  Wound Location Orientation: N/A  Wound Description (Comments): scrotum dressed with exofin       Subjective:        Mr Doris Villanueva is a 62-year-old gentleman with paraplegia and history of multiple pressure wounds with 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  He had been rescheduled for a debridement and flap closure by plastics in August but developed a new wound on his right mid to lower back chest wall  This wound probes deep and has been closed on the outside but the tract has not been improving    He had a CT scan that shows a deep tracking to the muscle but no fistulization to the internal thoracic cavity  02/04/2022 he returns now for re-evaluation  He still has had visiting nurses but has not been seen in the [address] [facility] since November  He denies any change or complaint    03/11/2022 no changes since been seen last   No new complaints  04/22/2022 no issues  No changes  05/27/2022  Doing well  Denies fevers or chills  No issues spoke about plastics a re-evaluation but he wants to wait for COVID to wane more and maybe next fall      The following portions of the patient's history were reviewed and updated as appropriate: allergies, current medications, past family history, past medical history, past social history, past surgical history and problem list     Review of Systems   Constitutional: Negative for chills and fever  HENT: Negative for ear pain and sore throat  Eyes: Negative for pain and visual disturbance  Respiratory: Negative for cough and shortness of breath  Cardiovascular: Negative for chest pain and palpitations  Gastrointestinal: Negative for abdominal pain and vomiting  Skin: Negative for color change and rash  Neurological: Positive for weakness and numbness  Psychiatric/Behavioral: Negative for agitation and behavioral problems  All other systems reviewed and are negative  Objective:       Wound 08/26/20 Buttocks Left (Active)   Wound Image Images linked 05/27/22 1026   Wound Description Pink;Probes to bone; Other (Comment) (communicates to the peineal wound) 05/27/22 0956   Shelby-wound Assessment Maceration;Scar Tissue 05/27/22 0956   Wound Length (cm) 4 3 cm 05/27/22 0956   Wound Width (cm) 2 5 cm 05/27/22 0956   Wound Depth (cm) 1 3 cm 05/27/22 0956   Wound Surface Area (cm^2) 10 75 cm^2 05/27/22 0956   Wound Volume (cm^3) 13 975 cm^3 05/27/22 0956   Calculated Wound Volume (cm^3) 13 98 cm^3 05/27/22 0956   Change in Wound Size % 51 46 05/27/22 0956   Drainage Amount Large 05/27/22 0956   Drainage Description Alva 05/27/22 0956   Patient Tolerance Tolerated well 05/27/22 0956   Dressing Status Removed 05/27/22 0956       Wound 08/26/20 Hip Left (Active)   Wound Image Images linked 05/27/22 1026   Wound Description Beefy red;Pink 05/27/22 0954   Shelby-wound Assessment Scar Tissue; Hyperpigmented 05/27/22 0954   Wound Length (cm) 2 1 cm 05/27/22 0954   Wound Width (cm) 1 cm 05/27/22 0954   Wound Depth (cm) 0 1 cm 05/27/22 0954   Wound Surface Area (cm^2) 2 1 cm^2 05/27/22 0954   Wound Volume (cm^3) 0 21 cm^3 05/27/22 0954   Calculated Wound Volume (cm^3) 0 21 cm^3 05/27/22 0954   Change in Wound Size % 8 7 05/27/22 0954   Drainage Amount Large 05/27/22 0954   Drainage Description Serosanguineous 05/27/22 0954   Patient Tolerance Tolerated well 05/27/22 0954   Dressing Status Removed 05/27/22 0954       Wound 07/29/21 Pressure Injury Back Right; Lower; Lateral (Active)   Wound Image Images linked 05/27/22 1023   Wound Description Pink 05/27/22 0950   Shelby-wound Assessment Maceration; Intact 05/27/22 0950   Wound Length (cm) 2 1 cm 05/27/22 0950   Wound Width (cm) 1 8 cm 05/27/22 0950   Wound Depth (cm) 1 6 cm 05/27/22 0950   Wound Surface Area (cm^2) 3 78 cm^2 05/27/22 0950   Wound Volume (cm^3) 6 048 cm^3 05/27/22 0950   Calculated Wound Volume (cm^3) 6 05 cm^3 05/27/22 0950   Change in Wound Size % 65 15 05/27/22 0950   Tunneling in depth located at 6 6 @ 11oclock 05/27/22 0950   Drainage Amount Copious 05/27/22 0950   Drainage Description Alva 05/27/22 0950   Patient Tolerance Tolerated well 05/27/22 0950   Dressing Status Removed 05/27/22 0950       Wound 11/05/21 Pressure Injury Perineum (Active)   Wound Image Images linked 05/27/22 1026   Wound Description Pink (comunnicates to left buttock wound at 9 oclock) 05/27/22 0959   Shelby-wound Assessment Scar Tissue; Hyperpigmented; Intact 05/27/22 0959   Wound Length (cm) 2 5 cm 05/27/22 0959   Wound Width (cm) 2 8 cm 05/27/22 0959   Wound Depth (cm) 1 4 cm 05/27/22 0959 Wound Surface Area (cm^2) 7 cm^2 05/27/22 0959   Wound Volume (cm^3) 9 8 cm^3 05/27/22 0959   Calculated Wound Volume (cm^3) 9 8 cm^3 05/27/22 0959   Change in Wound Size % -9700 05/27/22 0959   Tunneling in depth located at 8 4cm @ 12 oclock 05/27/22 0959   Drainage Amount Large 05/27/22 0959   Drainage Description Alva 05/27/22 0959   Patient Tolerance Tolerated well 05/27/22 0959   Dressing Status Removed 05/27/22 0959       Wound 05/27/22 Skin Tear Buttocks Left;Proximal (Active)   Wound Image Images linked 05/27/22 0947   Wound Description Pink 05/27/22 0954   Shelby-wound Assessment Scar Tissue 05/27/22 0954   Wound Length (cm) 0 7 cm 05/27/22 0954   Wound Width (cm) 0 5 cm 05/27/22 0954   Wound Depth (cm) 0 1 cm 05/27/22 0954   Wound Surface Area (cm^2) 0 35 cm^2 05/27/22 0954   Wound Volume (cm^3) 0 035 cm^3 05/27/22 0954   Calculated Wound Volume (cm^3) 0 04 cm^3 05/27/22 0954   Drainage Amount Scant 05/27/22 0954   Drainage Description Serous 05/27/22 0954   Patient Tolerance Tolerated well 05/27/22 0954   Dressing Status Removed 05/27/22 0954       /84   Pulse 60   Temp 98 3 °F (36 8 °C)   Resp 18     Physical Exam  Vitals and nursing note reviewed  Exam conducted with a chaperone present  Constitutional:       Appearance: Normal appearance  Cardiovascular:      Rate and Rhythm: Normal rate and regular rhythm  Pulmonary:      Effort: Pulmonary effort is normal       Breath sounds: Normal breath sounds  Abdominal:      General: There is no distension  Palpations: Abdomen is soft  There is no mass  Tenderness: There is no abdominal tenderness  Comments: Ostomy   Musculoskeletal:      Comments: Paraplegia  Right hip disarticulation and removal   Skin:     General: Skin is warm and dry  Comments: See complete wound assessment   Neurological:      Mental Status: He is alert     Psychiatric:         Mood and Affect: Mood normal          Behavior: Behavior normal  Wound Instructions:  Orders Placed This Encounter   Procedures    Wound cleansing and dressings     Wound cleansing and dressings          Left buttock:  Cleanse/Irrigate wound with Normal Saline with next dressing change  Cleanse gaby-wound skin with pH balanced soap and water  Apply skin prep to periwound  Apply primary dressing: Aquacel AG rope  Cover with allevyn life  Change dressing three times a week or as needed for drainage      Wound Left Hip  Cleanse/Irrigate wound with normal saline  Apply skin prep to periwound   Apply primary dressing: -allevyn life foam to hip wound and skin tear on hip  Change three times a week or as needed for drainage      R lateral back:  Cleanse with normal saline  Apply primary dressing: Aquacel AG rope  Apply skin prep to periwound  Cover with allevyn border foam   Change three times a week or as needed for drainage      Perinium:  Skin prep to periwound  Aquacel AG rope to wound bed  Extend into left buttock  Cover with allevyn life  Change three times a week or as needed for drainage  Left Proximal Buttock Skintear:  Cleanse with normal saline solution  Apply dermagran to wound  Cover with 4x4 gauze  Secure with tape  Change dressing 3x/week and as needed for soilage/dislodgement     HOME CARE  Continue home care services/skilled nursing - 3 x per week (family to do in between), daily visits for one week to pack right back wound      OFF-LOADING  Avoid pressure at wound site  - all wounds, including right back  Wheelchair Cushion  - ROHO cushion  Do Not Sit for Long Periods of Time  - 1 hr max for meals only, otherwise in bed and turn side to side at least  every 3 hours or more frequently  Reposition in regular bed for now at least every 1-2 hours while awake      Follow up in 6 weeks      Today's Wound treatment note: All wounds cleansed with normal saline    Redressed as ordered above     Standing Status:   Future     Standing Expiration Date: 5/27/2023    Debridement     This order was created via procedure documentation    Debridement     This order was created via procedure documentation        Diagnosis ICD-10-CM Associated Orders   1  Open wound of buttock, left, initial encounter  S31 829A Wound cleansing and dressings   2  Pressure ulcer of right lower back, stage 3 ([facility])  L89 133 Wound cleansing and dressings     Debridement   3  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 ([facility])  B04 511 Wound cleansing and dressings   4  Left perineal ischial pressure ulcer, stage IV ([facility])  L89 324 Wound cleansing and dressings   5  Controlled type 2 diabetes mellitus with stage 1 chronic kidney disease, unspecified whether long term insulin use ([facility])  E11 22     N18 1    6  Paraplegic spinal paralysis ([facility] [address] )  G82 20    7   Decubitus ulcer of ischium, left, stage IV ([facility])  L89 324 Debridement

## 2022-05-27 NOTE — ASSESSMENT & PLAN NOTE
The wounds around the perineum track towards the left ischium and buttock and 1 continue with wound underneath the skin bridge  Continue the same wound care  Discussed re-evaluation by Plastic surgery but he does not want to be in the hospital until he feels that COVID has waned more    I will see him back in 4-6 week

## 2022-05-29 DIAGNOSIS — L89.324 STAGE IV PRESSURE ULCER OF LEFT BUTTOCK (HCC): ICD-10-CM

## 2022-05-29 DIAGNOSIS — M54.50 CHRONIC LOW BACK PAIN WITHOUT SCIATICA, UNSPECIFIED BACK PAIN LATERALITY: ICD-10-CM

## 2022-05-29 DIAGNOSIS — G89.29 CHRONIC LOW BACK PAIN WITHOUT SCIATICA, UNSPECIFIED BACK PAIN LATERALITY: ICD-10-CM

## 2022-05-31 DIAGNOSIS — R19.5 HEME POSITIVE STOOL: Primary | ICD-10-CM

## 2022-06-01 ENCOUNTER — HOME CARE VISIT (OUTPATIENT)
Dept: HOME HEALTH SERVICES | Facility: HOME HEALTHCARE | Age: 62
End: 2022-06-01
Payer: MEDICARE

## 2022-06-01 VITALS
HEART RATE: 76 BPM | RESPIRATION RATE: 12 BRPM | TEMPERATURE: 96.9 F | SYSTOLIC BLOOD PRESSURE: 112 MMHG | OXYGEN SATURATION: 98 % | DIASTOLIC BLOOD PRESSURE: 74 MMHG

## 2022-06-01 PROCEDURE — 400014 VN F/U

## 2022-06-01 PROCEDURE — G0300 HHS/HOSPICE OF LPN EA 15 MIN: HCPCS

## 2022-06-02 PROCEDURE — 10330064

## 2022-06-02 PROCEDURE — 10330064 TAPE, RETENTION 2"X10YDS (1/BX24BX/CS)

## 2022-06-02 PROCEDURE — 10330064 PAD, ABD 5X9" STR LF (1/PK 20PK/BX) MGM1

## 2022-06-02 PROCEDURE — 10330064 SPONGE, GAUZE 8PLY N/S 4"X4" (200/PK 20P

## 2022-06-03 ENCOUNTER — HOME CARE VISIT (OUTPATIENT)
Dept: HOME HEALTH SERVICES | Facility: HOME HEALTHCARE | Age: 62
End: 2022-06-03
Payer: MEDICARE

## 2022-06-03 PROCEDURE — G0300 HHS/HOSPICE OF LPN EA 15 MIN: HCPCS

## 2022-06-03 RX ORDER — OXYCODONE HYDROCHLORIDE 20 MG/1
20 TABLET ORAL 3 TIMES DAILY PRN
Qty: 90 TABLET | Refills: 0 | Status: SHIPPED | OUTPATIENT
Start: 2022-06-03 | End: 2022-06-27 | Stop reason: SDUPTHER

## 2022-06-04 VITALS
TEMPERATURE: 97.4 F | OXYGEN SATURATION: 97 % | RESPIRATION RATE: 16 BRPM | DIASTOLIC BLOOD PRESSURE: 78 MMHG | HEART RATE: 56 BPM | SYSTOLIC BLOOD PRESSURE: 124 MMHG

## 2022-06-06 ENCOUNTER — HOME CARE VISIT (OUTPATIENT)
Dept: HOME HEALTH SERVICES | Facility: HOME HEALTHCARE | Age: 62
End: 2022-06-06
Payer: MEDICARE

## 2022-06-06 VITALS
OXYGEN SATURATION: 97 % | TEMPERATURE: 98.1 F | HEART RATE: 72 BPM | RESPIRATION RATE: 16 BRPM | SYSTOLIC BLOOD PRESSURE: 130 MMHG | DIASTOLIC BLOOD PRESSURE: 82 MMHG

## 2022-06-06 PROCEDURE — G0300 HHS/HOSPICE OF LPN EA 15 MIN: HCPCS

## 2022-06-08 ENCOUNTER — HOME CARE VISIT (OUTPATIENT)
Dept: HOME HEALTH SERVICES | Facility: HOME HEALTHCARE | Age: 62
End: 2022-06-08
Payer: MEDICARE

## 2022-06-08 VITALS
OXYGEN SATURATION: 99 % | RESPIRATION RATE: 20 BRPM | SYSTOLIC BLOOD PRESSURE: 94 MMHG | TEMPERATURE: 97.3 F | HEART RATE: 76 BPM | DIASTOLIC BLOOD PRESSURE: 66 MMHG

## 2022-06-08 PROCEDURE — G0300 HHS/HOSPICE OF LPN EA 15 MIN: HCPCS

## 2022-06-10 ENCOUNTER — HOME CARE VISIT (OUTPATIENT)
Dept: HOME HEALTH SERVICES | Facility: HOME HEALTHCARE | Age: 62
End: 2022-06-10
Payer: MEDICARE

## 2022-06-10 VITALS
HEART RATE: 84 BPM | SYSTOLIC BLOOD PRESSURE: 106 MMHG | DIASTOLIC BLOOD PRESSURE: 64 MMHG | OXYGEN SATURATION: 97 % | TEMPERATURE: 97.8 F | RESPIRATION RATE: 20 BRPM

## 2022-06-10 PROCEDURE — G0300 HHS/HOSPICE OF LPN EA 15 MIN: HCPCS

## 2022-06-13 ENCOUNTER — HOME CARE VISIT (OUTPATIENT)
Dept: HOME HEALTH SERVICES | Facility: HOME HEALTHCARE | Age: 62
End: 2022-06-13
Payer: MEDICARE

## 2022-06-13 PROCEDURE — G0300 HHS/HOSPICE OF LPN EA 15 MIN: HCPCS

## 2022-06-15 ENCOUNTER — HOME CARE VISIT (OUTPATIENT)
Dept: HOME HEALTH SERVICES | Facility: HOME HEALTHCARE | Age: 62
End: 2022-06-15
Payer: MEDICARE

## 2022-06-15 VITALS
TEMPERATURE: 98.6 F | HEART RATE: 72 BPM | SYSTOLIC BLOOD PRESSURE: 130 MMHG | SYSTOLIC BLOOD PRESSURE: 94 MMHG | RESPIRATION RATE: 20 BRPM | DIASTOLIC BLOOD PRESSURE: 74 MMHG | RESPIRATION RATE: 14 BRPM | HEART RATE: 104 BPM | TEMPERATURE: 98.6 F | OXYGEN SATURATION: 99 % | DIASTOLIC BLOOD PRESSURE: 84 MMHG | OXYGEN SATURATION: 96 %

## 2022-06-15 PROCEDURE — G0300 HHS/HOSPICE OF LPN EA 15 MIN: HCPCS

## 2022-06-16 PROCEDURE — 10330064 CATHETER, FOLEY 2WAY 5CC 16FR (10/BX)

## 2022-06-16 PROCEDURE — 10330064

## 2022-06-16 PROCEDURE — 10330064 SYRINGE, CATH TIP FLAT STR 60CC (50/CS)

## 2022-06-16 PROCEDURE — 10330064 DRESSING, HYDROGEL 4X4 (15/BX 4BX/CS)

## 2022-06-16 PROCEDURE — 10330064 PAD, ABD 5X9" STR LF (1/PK 20PK/BX) MGM1

## 2022-06-16 PROCEDURE — 10330064 CATH TRAY, FOLEY SYR 10CC (20/CS)

## 2022-06-16 PROCEDURE — 10330064 TAPE, RETENTION 2"X10YDS (1/BX24BX/CS)

## 2022-06-16 PROCEDURE — 10330064 SPONGE, GAUZE 8PLY N/S 4"X4" (200/PK 20P

## 2022-06-16 PROCEDURE — 10330064 FOAM, ADH SIL W/BORDER 4"X4" (10/BX 20BX

## 2022-06-16 PROCEDURE — 10330064 SYRINGE, LL 10CC (100/BX 12BX/CS)

## 2022-06-17 ENCOUNTER — TELEPHONE (OUTPATIENT)
Dept: HOME HEALTH SERVICES | Facility: HOME HEALTHCARE | Age: 62
End: 2022-06-17

## 2022-06-17 ENCOUNTER — TELEPHONE (OUTPATIENT)
Dept: FAMILY MEDICINE CLINIC | Facility: CLINIC | Age: 62
End: 2022-06-17

## 2022-06-17 NOTE — TELEPHONE ENCOUNTER
TC to patient related to rescheduling SN visit planned for today  Patient is agreeable with next vs on Monday 6/20  Instructed patient to call [facility] if a problem arises

## 2022-06-20 ENCOUNTER — HOME CARE VISIT (OUTPATIENT)
Dept: HOME HEALTH SERVICES | Facility: HOME HEALTHCARE | Age: 62
End: 2022-06-20
Payer: MEDICARE

## 2022-06-20 VITALS
OXYGEN SATURATION: 99 % | HEART RATE: 86 BPM | RESPIRATION RATE: 18 BRPM | SYSTOLIC BLOOD PRESSURE: 122 MMHG | TEMPERATURE: 98 F | DIASTOLIC BLOOD PRESSURE: 76 MMHG

## 2022-06-20 PROCEDURE — G0300 HHS/HOSPICE OF LPN EA 15 MIN: HCPCS

## 2022-06-22 ENCOUNTER — HOME CARE VISIT (OUTPATIENT)
Dept: HOME HEALTH SERVICES | Facility: HOME HEALTHCARE | Age: 62
End: 2022-06-22
Payer: MEDICARE

## 2022-06-22 VITALS
HEART RATE: 78 BPM | TEMPERATURE: 97.6 F | RESPIRATION RATE: 18 BRPM | OXYGEN SATURATION: 96 % | SYSTOLIC BLOOD PRESSURE: 120 MMHG | DIASTOLIC BLOOD PRESSURE: 80 MMHG

## 2022-06-22 PROCEDURE — G0299 HHS/HOSPICE OF RN EA 15 MIN: HCPCS

## 2022-06-24 ENCOUNTER — HOME CARE VISIT (OUTPATIENT)
Dept: HOME HEALTH SERVICES | Facility: HOME HEALTHCARE | Age: 62
End: 2022-06-24
Payer: MEDICARE

## 2022-06-24 PROCEDURE — G0299 HHS/HOSPICE OF RN EA 15 MIN: HCPCS

## 2022-06-25 VITALS
HEART RATE: 60 BPM | SYSTOLIC BLOOD PRESSURE: 128 MMHG | TEMPERATURE: 97.8 F | OXYGEN SATURATION: 99 % | RESPIRATION RATE: 18 BRPM | DIASTOLIC BLOOD PRESSURE: 70 MMHG

## 2022-06-27 ENCOUNTER — HOME CARE VISIT (OUTPATIENT)
Dept: HOME HEALTH SERVICES | Facility: HOME HEALTHCARE | Age: 62
End: 2022-06-27
Payer: MEDICARE

## 2022-06-27 VITALS
DIASTOLIC BLOOD PRESSURE: 68 MMHG | TEMPERATURE: 97.3 F | RESPIRATION RATE: 20 BRPM | OXYGEN SATURATION: 98 % | SYSTOLIC BLOOD PRESSURE: 100 MMHG | HEART RATE: 84 BPM

## 2022-06-27 DIAGNOSIS — E11.9 TYPE 2 DIABETES MELLITUS WITHOUT COMPLICATION, WITHOUT LONG-TERM CURRENT USE OF INSULIN (HCC): ICD-10-CM

## 2022-06-27 PROCEDURE — G0300 HHS/HOSPICE OF LPN EA 15 MIN: HCPCS

## 2022-06-27 PROCEDURE — 10330064 SPONGE, GAUZE 8PLY N/S 4"X4" (200/PK 20P

## 2022-06-27 PROCEDURE — 10330064 PAD, ABD 5X9" STR LF (1/PK 20PK/BX) MGM1

## 2022-06-27 PROCEDURE — 10330064

## 2022-06-27 PROCEDURE — 10330064 TAPE, RETENTION 2"X10YDS (1/BX24BX/CS)

## 2022-06-27 PROCEDURE — 10330064 FOAM, ADH SIL W/BORDER 4"X4" (10/BX 20BX

## 2022-06-27 NOTE — CASE COMMUNICATION
[facility] ab   Wound 1 x Full x Wound 2 x Full x   Gauze 4x4 N/S 200 4x4s per unit [id number] 2   ABD 5x9 [id number]   Tape   [facility] [id number] 2   Aquacel AG rope 10   Silicone Foam with border 4x4 NOT STOCKED [id number] 10

## 2022-06-28 RX ORDER — LANCETS
EACH MISCELLANEOUS DAILY
Qty: 100 EACH | Refills: 0 | Status: SHIPPED | OUTPATIENT
Start: 2022-06-28

## 2022-06-29 ENCOUNTER — HOME CARE VISIT (OUTPATIENT)
Dept: HOME HEALTH SERVICES | Facility: HOME HEALTHCARE | Age: 62
End: 2022-06-29
Payer: MEDICARE

## 2022-06-29 PROCEDURE — 400014 VN F/U

## 2022-06-29 PROCEDURE — G0299 HHS/HOSPICE OF RN EA 15 MIN: HCPCS

## 2022-06-30 DIAGNOSIS — R82.90 FOUL SMELLING URINE: Primary | ICD-10-CM

## 2022-07-01 ENCOUNTER — HOME CARE VISIT (OUTPATIENT)
Dept: HOME HEALTH SERVICES | Facility: HOME HEALTHCARE | Age: 62
End: 2022-07-01
Payer: MEDICARE

## 2022-07-01 ENCOUNTER — TELEPHONE (OUTPATIENT)
Dept: FAMILY MEDICINE CLINIC | Facility: CLINIC | Age: 62
End: 2022-07-01

## 2022-07-01 PROCEDURE — G0299 HHS/HOSPICE OF RN EA 15 MIN: HCPCS

## 2022-07-01 NOTE — TELEPHONE ENCOUNTER
MEDICARE PART B DETAILED WRITTEN ORDER / DM SUPPLIES form received on 07/01/22  to be completed by PCP  Copy made and placed in PCP folder  Forms to be delivered to PCP mailbox at assigned time        NOTE: a blank copy has been made and placed at  copy bin, under the letter M

## 2022-07-06 ENCOUNTER — HOME CARE VISIT (OUTPATIENT)
Dept: HOME HEALTH SERVICES | Facility: HOME HEALTHCARE | Age: 62
End: 2022-07-06
Payer: MEDICARE

## 2022-07-06 VITALS
HEART RATE: 66 BPM | TEMPERATURE: 97.4 F | SYSTOLIC BLOOD PRESSURE: 130 MMHG | OXYGEN SATURATION: 97 % | DIASTOLIC BLOOD PRESSURE: 80 MMHG | RESPIRATION RATE: 18 BRPM

## 2022-07-06 PROCEDURE — G0299 HHS/HOSPICE OF RN EA 15 MIN: HCPCS

## 2022-07-07 VITALS
HEART RATE: 78 BPM | TEMPERATURE: 98 F | SYSTOLIC BLOOD PRESSURE: 120 MMHG | DIASTOLIC BLOOD PRESSURE: 70 MMHG | OXYGEN SATURATION: 98 % | RESPIRATION RATE: 20 BRPM

## 2022-07-08 ENCOUNTER — HOME CARE VISIT (OUTPATIENT)
Dept: HOME HEALTH SERVICES | Facility: HOME HEALTHCARE | Age: 62
End: 2022-07-08
Payer: MEDICARE

## 2022-07-08 PROCEDURE — G0299 HHS/HOSPICE OF RN EA 15 MIN: HCPCS

## 2022-07-11 ENCOUNTER — HOME CARE VISIT (OUTPATIENT)
Dept: HOME HEALTH SERVICES | Facility: HOME HEALTHCARE | Age: 62
End: 2022-07-11
Payer: MEDICARE

## 2022-07-11 PROCEDURE — G0299 HHS/HOSPICE OF RN EA 15 MIN: HCPCS

## 2022-07-12 VITALS
SYSTOLIC BLOOD PRESSURE: 120 MMHG | RESPIRATION RATE: 18 BRPM | TEMPERATURE: 97.2 F | HEART RATE: 78 BPM | DIASTOLIC BLOOD PRESSURE: 70 MMHG | OXYGEN SATURATION: 98 %

## 2022-07-13 ENCOUNTER — HOME CARE VISIT (OUTPATIENT)
Dept: HOME HEALTH SERVICES | Facility: HOME HEALTHCARE | Age: 62
End: 2022-07-13
Payer: MEDICARE

## 2022-07-13 PROCEDURE — 10330064 DRESSING, HYDROCELLULAR ADH STR FOAM 4"X

## 2022-07-13 PROCEDURE — 10330064 TAPE, RETENTION 2"X10YDS (1/BX24BX/CS)

## 2022-07-13 PROCEDURE — G0299 HHS/HOSPICE OF RN EA 15 MIN: HCPCS

## 2022-07-13 PROCEDURE — 10330064 PAD, ABD 5X9" STR LF (1/PK 20PK/BX) MGM1

## 2022-07-13 PROCEDURE — 10330064 SPONGE, GAUZE 8PLY N/S 4"X4" (200/PK 20P

## 2022-07-13 PROCEDURE — 10330064 WIPE, SKIN GEL PROT DRSNG (50/BX)

## 2022-07-13 PROCEDURE — 10330064

## 2022-07-13 PROCEDURE — 10330064 GLOVE, EXAM VNYL MED N/S (100/BX 10BX/CS

## 2022-07-14 VITALS
HEART RATE: 76 BPM | TEMPERATURE: 97.1 F | OXYGEN SATURATION: 97 % | DIASTOLIC BLOOD PRESSURE: 80 MMHG | RESPIRATION RATE: 20 BRPM | SYSTOLIC BLOOD PRESSURE: 120 MMHG

## 2022-07-15 ENCOUNTER — HOME CARE VISIT (OUTPATIENT)
Dept: HOME HEALTH SERVICES | Facility: HOME HEALTHCARE | Age: 62
End: 2022-07-15
Payer: MEDICARE

## 2022-07-18 ENCOUNTER — HOME CARE VISIT (OUTPATIENT)
Dept: HOME HEALTH SERVICES | Facility: HOME HEALTHCARE | Age: 62
End: 2022-07-18
Payer: MEDICARE

## 2022-07-18 PROCEDURE — G0299 HHS/HOSPICE OF RN EA 15 MIN: HCPCS

## 2022-07-19 VITALS
OXYGEN SATURATION: 97 % | HEART RATE: 78 BPM | DIASTOLIC BLOOD PRESSURE: 70 MMHG | RESPIRATION RATE: 20 BRPM | SYSTOLIC BLOOD PRESSURE: 120 MMHG | TEMPERATURE: 97.4 F

## 2022-07-20 ENCOUNTER — HOME CARE VISIT (OUTPATIENT)
Dept: HOME HEALTH SERVICES | Facility: HOME HEALTHCARE | Age: 62
End: 2022-07-20
Payer: MEDICARE

## 2022-07-20 PROCEDURE — G0299 HHS/HOSPICE OF RN EA 15 MIN: HCPCS

## 2022-07-22 ENCOUNTER — OFFICE VISIT (OUTPATIENT)
Dept: WOUND CARE | Facility: CLINIC | Age: 62
End: 2022-07-22
Payer: MEDICARE

## 2022-07-22 VITALS
RESPIRATION RATE: 16 BRPM | DIASTOLIC BLOOD PRESSURE: 56 MMHG | SYSTOLIC BLOOD PRESSURE: 94 MMHG | HEART RATE: 72 BPM | TEMPERATURE: 97.9 F

## 2022-07-22 DIAGNOSIS — L89.324 LEFT PERINEAL ISCHIAL PRESSURE ULCER, STAGE IV (HCC): ICD-10-CM

## 2022-07-22 DIAGNOSIS — L89.223 STAGE III PRESSURE ULCER OF LEFT HIP (HCC): ICD-10-CM

## 2022-07-22 DIAGNOSIS — L89.133 PRESSURE ULCER OF RIGHT LOWER BACK, STAGE 3 (HCC): ICD-10-CM

## 2022-07-22 DIAGNOSIS — S31.829A: Primary | ICD-10-CM

## 2022-07-22 DIAGNOSIS — G82.20 PARAPLEGIC SPINAL PARALYSIS (HCC): ICD-10-CM

## 2022-07-22 PROCEDURE — 11042 DBRDMT SUBQ TIS 1ST 20SQCM/<: CPT | Performed by: SURGERY

## 2022-07-22 PROCEDURE — 11045 DBRDMT SUBQ TISS EACH ADDL: CPT | Performed by: SURGERY

## 2022-07-22 RX ORDER — LIDOCAINE 40 MG/G
CREAM TOPICAL ONCE
Status: COMPLETED | OUTPATIENT
Start: 2022-07-22 | End: 2022-07-22

## 2022-07-22 RX ADMIN — LIDOCAINE: 40 CREAM TOPICAL at 10:31

## 2022-07-22 NOTE — PATIENT INSTRUCTIONS
Orders Placed This Encounter   Procedures    Wound cleansing and dressings         Wound cleansing and dressings          Left buttock:  Cleanse/Irrigate wound with Normal Saline with next dressing change  Apply skin prep to periwound  Gently pack wound with mesalt rope  Tape tail  Cover with alginate  Cover with allevyn sacral bordered foam   Change dressing three times a week or as needed for drainage  Wound Left Hip  Cleanse/Irrigate wound with normal saline  Apply skin prep to periwound  Cover with alginate  Apply primary dressing: -allevyn bordered foam to hip wound and skin tear on hip  Change three times a week or as needed for drainage  R lateral back:  Cleanse with normal saline  Gently pack with mesalt rope  Tape the tail  Cover yellow/tan area with alginate  Cover with allevyn border foam   Change three times a week or as needed for drainage  Perinium:  Skin prep to periwound  Gently pack with mesalt rope  Extend into left buttock and Tape tail  Cover with allevyn life  Change three times a week or as needed for drainage  CONTACT [employer] to assess chair  You need to keep pressure off of right back wound  HOME CARE  Continue home care services/skilled nursing - 3 x per week (family to do in between), daily visits for one week to pack right back wound      OFF-LOADING  Avoid pressure at wound site  - all wounds, including right back  Wheelchair Cushion  - ROHO cushion  Do Not Sit for Long Periods of Time  - 1 hr max for meals only, otherwise in bed and turn side to side at least  every 3 hours or more frequently  Reposition in regular bed for now at least every 1-2 hours while awake  Follow up in 2 weeks  Today's Wound treatment note: Cleansed with NSS and dressed as above       Standing Status:   Future     Standing Expiration Date:   7/22/2023

## 2022-07-22 NOTE — PROGRESS NOTES
Patient ID: Vasquez Gomez is a 65 y o  male Date of Birth 1960     Chief Complaint  Chief Complaint   Patient presents with    Follow Up Wound Care Visit     Buttock, sacral and back wounds       Allergies  Patient has no known allergies  Assessment:    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)  The wound is slightly better with more skin growing over the inferior portion  Continue the same care    Open wound of buttock, left, initial encounter  This wound tracks under the hip wound and also tracks all the over to the perineum wound which then also tracks to 12 o'clock  The base was debrided with a curette  Will continue the same care  Stage IV pressure ulcer of right lower back (HCC)  There has been significant changes to the wound on his right back/chest wall  There was a large area with signs of ongoing pressure  There is a portion with thick black firm eschar and the portion with yellow tan fibrous tissue that was debrided  The wound probes underneath this area and there was a large cavity underneath the skin  Will attempt to use Mesalt packing  This looks like ongoing pressure possibly from the arm of his wheelchair  I really recommend he have his wheelchair re-evaluated for appropriate fit  Explained the importance of keeping off of the arm rest   I told with the pain increases or develops fevers chills or redness he needs to be seen urgently or in the emergency department  There is a high chance that lot of the skin will breakdown due to the pressure injury  Will continue to monitor and follow up in 2 weeks  Diagnoses and all orders for this visit:    Open wound of buttock, left, initial encounter  -     Wound cleansing and dressings; Future    Pressure ulcer of right lower back, stage 3 (HCC)  -     lidocaine (LMX) 4 % cream  -     Wound cleansing and dressings; Future    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)  -     Wound cleansing and dressings;  Future    Left perineal ischial pressure ulcer, stage IV ([facility])  -     Wound cleansing and dressings; Future    Paraplegic spinal paralysis ([address] )    Other orders  -     Debridement  -     Debridement              Debridement   Wound 08/26/20 Pressure Injury Buttocks Left    Universal Protocol:  Consent given by: patient  Time out: Immediately prior to procedure a "time out" was called to verify the correct patient, procedure, equipment, support staff and site/side marked as required  Performed by: physician  Debridement type: surgical  Level of debridement: subcutaneous tissue  Pain control: lidocaine 4%  Post-debridement measurements  Length (cm): 1 5  Width (cm): 5  Depth (cm): 2  Percent debrided: 100%  Surface Area (cm^2): 7 5  Area debrided (cm^2): 7 5  Volume (cm^3): 15  Tissue and other material debrided: subcutaneous tissue  Devitalized tissue debrided: biofilm and slough  Instrument(s) utilized: curette  Procedural pain (0-10): insensate  Post-procedural pain: insensate   Response to treatment: procedure was tolerated well    Debridement   Wound 07/29/21 Pressure Injury Back Right; Lower; Lateral    Universal Protocol:  Consent given by: patient  Time out: Immediately prior to procedure a "time out" was called to verify the correct patient, procedure, equipment, support staff and site/side marked as required      Performed by: physician  Debridement type: surgical  Level of debridement: subcutaneous tissue  Pain control: lidocaine 4%  Post-debridement measurements  Length (cm): 4 5  Width (cm): 8 5  Depth (cm): 1 6  Percent debrided: 50%  Surface Area (cm^2): 38 25  Area debrided (cm^2): 19 13  Volume (cm^3): 61 2  Tissue and other material debrided: subcutaneous tissue  Devitalized tissue debrided: biofilm and slough  Instrument(s) utilized: curette  Procedural pain (0-10): 2  Post-procedural pain: 1   Response to treatment: procedure was tolerated well          Plan:     Wound 08/26/20 Pressure Injury Buttocks Left (Active) Wound Image Images linked 07/22/22 0940   Wound Description Pink;Probes to bone 07/22/22 0944   Shelby-wound Assessment Maceration;Scar Tissue 07/22/22 0944   Wound Length (cm) 1 5 cm 07/22/22 0944   Wound Width (cm) 5 cm 07/22/22 0944   Wound Depth (cm) 1 9 cm 07/22/22 0944   Wound Surface Area (cm^2) 7 5 cm^2 07/22/22 0944   Wound Volume (cm^3) 14 25 cm^3 07/22/22 0944   Calculated Wound Volume (cm^3) 14 25 cm^3 07/22/22 0944   Change in Wound Size % 50 52 07/22/22 0944   Tunneling in depth located at 5 2 @ 10:00, 5 @ 3:00 connecting to perineal wound 07/22/22 0944   Undermining 2 6 07/22/22 0944   Undermining is depth extending from 7-9 07/22/22 0944   Drainage Amount Large 07/22/22 0944   Drainage Description Yellow 07/22/22 0944   Non-staged Wound Description Full thickness 07/22/22 0944   Treatments Irrigation with NSS 07/22/22 0944       Wound 08/26/20 Pressure Injury Hip Left (Active)   Wound Image Images linked 07/22/22 0941   Wound Description Pink;Pale;Epithelialization;Yellow (skin bridge) 07/22/22 0950   Shelby-wound Assessment Scar Tissue; Hyperpigmented 07/22/22 0950   Wound Length (cm) 0 8 cm 07/22/22 0950   Wound Width (cm) 0 5 cm 07/22/22 0950   Wound Depth (cm) 0 2 cm 07/22/22 0950   Wound Surface Area (cm^2) 0 4 cm^2 07/22/22 0950   Wound Volume (cm^3) 0 08 cm^3 07/22/22 0950   Calculated Wound Volume (cm^3) 0 08 cm^3 07/22/22 0950   Change in Wound Size % 65 22 07/22/22 0950   Undermining 0 4 07/22/22 0950   Undermining is depth extending from 1-4 07/22/22 0950   Drainage Amount Small 07/22/22 0950   Drainage Description Yellow 07/22/22 0950   Non-staged Wound Description Full thickness 07/22/22 0950   Treatments Irrigation with NSS 07/22/22 0950       Wound 07/29/21 Pressure Injury Back Right; Lower; Lateral (Active)   Wound Image Images linked 07/22/22 1018   Wound Description Necrotic;Slough;Pink;Probes to bone 07/22/22 1000   Shelby-wound Assessment Intact;Scar Tissue; Hyperpigmented 07/22/22 1000 Wound Length (cm) 4 5 cm 07/22/22 1000   Wound Width (cm) 8 5 cm 07/22/22 1000   Wound Depth (cm) 1 6 cm 07/22/22 1000   Wound Surface Area (cm^2) 38 25 cm^2 07/22/22 1000   Wound Volume (cm^3) 61 2 cm^3 07/22/22 1000   Calculated Wound Volume (cm^3) 61 2 cm^3 07/22/22 1000   Change in Wound Size % -252 53 07/22/22 1000   Tunneling in depth located at 7 @ 11:00 07/22/22 1000   Drainage Amount Copious 07/22/22 1000   Drainage Description Tan;Purulent; Foul smelling 07/22/22 1000   Non-staged Wound Description Full thickness 07/22/22 1000   Treatments Irrigation with NSS 07/22/22 1000       Wound 11/05/21 Pressure Injury Perineum (Active)   Wound Image Images linked 07/22/22 0941   Wound Description Pink 07/22/22 0954   Shelby-wound Assessment Scar Tissue; Hyperpigmented; Intact 07/22/22 0954   Wound Length (cm) 1 5 cm 07/22/22 0954   Wound Width (cm) 2 3 cm 07/22/22 0954   Wound Depth (cm) 2 1 cm 07/22/22 0954   Wound Surface Area (cm^2) 3 45 cm^2 07/22/22 0954   Wound Volume (cm^3) 7 245 cm^3 07/22/22 0954   Calculated Wound Volume (cm^3) 7 25 cm^3 07/22/22 0954   Change in Wound Size % -7150 07/22/22 0954   Tunneling in depth located at 9:00 connecting to left buttock,  3 @ 12:00 07/22/22 0954   Undermining 5 2 07/22/22 0954   Undermining is depth extending from 2-4 07/22/22 0954   Drainage Amount Large 07/22/22 0954   Drainage Description Yellow 07/22/22 0954   Non-staged Wound Description Full thickness 07/22/22 0954   Treatments Irrigation with NSS 07/22/22 0954       Wound 05/27/22 Skin Tear Buttocks Left;Proximal (Active)   Wound Image Images linked 07/22/22 1019   Wound Description Epithelialization 07/22/22 0943   Shelby-wound Assessment Scar Tissue 07/22/22 0943   Wound Length (cm) 0 cm 07/22/22 0943   Wound Width (cm) 0 cm 07/22/22 0943   Wound Depth (cm) 0 cm 07/22/22 0943   Wound Surface Area (cm^2) 0 cm^2 07/22/22 0943   Wound Volume (cm^3) 0 cm^3 07/22/22 0943   Calculated Wound Volume (cm^3) 0 cm^3 07/22/22 0943   Change in Wound Size % 100 07/22/22 0943   Drainage Amount None 07/22/22 0943       Wound 08/26/20 Pressure Injury Buttocks Left (Active)   Date First Assessed/Time First Assessed: 08/26/20 1056   Primary Wound Type: Pressure Injury  Location: Buttocks  Wound Location Orientation: Left       Wound 08/26/20 Pressure Injury Hip Left (Active)   Date First Assessed/Time First Assessed: 08/26/20 1057   Primary Wound Type: Pressure Injury  Location: Hip  Wound Location Orientation: Left       Wound 07/29/21 Pressure Injury Back Right; Lower; Lateral (Active)   Date First Assessed: 07/29/21   Primary Wound Type: Pressure Injury  Location: Back  Wound Location Orientation: Right; Lower; Lateral       Wound 11/05/21 Pressure Injury Perineum (Active)   Date First Assessed/Time First Assessed: 11/05/21 1059   Primary Wound Type: Pressure Injury  Location: Perineum       Wound 05/27/22 Skin Tear Buttocks Left;Proximal (Active)   Date First Assessed/Time First Assessed: 05/27/22 0946   Primary Wound Type: Skin Tear  Location: Buttocks  Wound Location Orientation: Left;Proximal  Wound Outcome: Healed       [REMOVED] Wound 08/26/19 Buttocks Left;Distal;Lateral (Removed)   Resolved Date/Resolved Time: 08/26/20 1056  Date First Assessed/Time First Assessed: 08/26/19 1130   Pre-Existing Wound: Yes  Location: Buttocks  Wound Location Orientation: Left;Distal;Lateral  Wound Description (Comments): Deep ischial wound       [REMOVED] Wound 09/16/19 Buttocks Right;Distal (Removed)   Resolved Date/Resolved Time: 08/26/20 1055  Date First Assessed/Time First Assessed: 09/16/19 1657   Pre-Existing Wound: Yes  Location: Buttocks  Wound Location Orientation: Right;Distal       [REMOVED] Wound 10/28/19 Knee Left (Removed)   Resolved Date/Resolved Time: 08/26/20 1055  Date First Assessed/Time First Assessed: 10/28/19 0657   Pre-Existing Wound: Yes  Location: Knee  Wound Location Orientation: Left  Wound Description (Comments): round black  Dressing Status: Open to air       [REMOVED] Wound 10/28/19 Buttocks Left;Mid (Removed)   Resolved Date/Resolved Time: 08/26/20 1056  Date First Assessed/Time First Assessed: 10/28/19 1108   Pre-Existing Wound: Yes  Location: Buttocks  Wound Location Orientation: Left;Mid  Wound Description (Comments): Stage 2 vs traumatic injury       [REMOVED] Wound 11/01/19 Other (Comment) N/A (Removed)   Resolved Date/Resolved Time: 08/26/20 1055  Date First Assessed/Time First Assessed: 11/01/19 1640   Location: Other (Comment)  Wound Location Orientation: N/A  Wound Description (Comments): scrotum dressed with exofin       Subjective:        Mr Daniel Romano is a 62-year-old gentleman with paraplegia and history of multiple pressure wounds with 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  He had been rescheduled for a debridement and flap closure by plastics in August but developed a new wound on his right mid to lower back chest wall  This wound probes deep and has been closed on the outside but the tract has not been improving  He had a CT scan that shows a deep tracking to the muscle but no fistulization to the internal thoracic cavity  02/04/2022 he returns now for re-evaluation  He still has had visiting nurses but has not been seen in the [facility] since November  He denies any change or complaint    03/11/2022 no changes since been seen last   No new complaints  04/22/2022 no issues  No changes  05/27/2022  Doing well  Denies fevers or chills  No issues spoke about plastics a re-evaluation but he wants to wait for COVID to wane more and maybe next fall    07/22/2022  He has not been seen here since May mostly due to transportation issues  He has significant more pain and drainage on his right back chest wall wound    Otherwise no changes      The following portions of the patient's history were reviewed and updated as appropriate: allergies, current medications, past family history, past medical history, past social history, past surgical history and problem list     Review of Systems   Constitutional: Negative for chills and fever  HENT: Negative for ear pain and sore throat  Eyes: Negative for pain and visual disturbance  Respiratory: Negative for cough and shortness of breath  Cardiovascular: Negative for chest pain and palpitations  Gastrointestinal: Negative for abdominal pain and vomiting  Skin: Negative for color change and rash  Neurological: Positive for weakness and numbness  Psychiatric/Behavioral: Negative for agitation and behavioral problems  All other systems reviewed and are negative  Objective:       Wound 08/26/20 Pressure Injury Buttocks Left (Active)   Wound Image Images linked 07/22/22 0940   Wound Description Pink;Probes to bone 07/22/22 0944   Shelby-wound Assessment Maceration;Scar Tissue 07/22/22 0944   Wound Length (cm) 1 5 cm 07/22/22 0944   Wound Width (cm) 5 cm 07/22/22 0944   Wound Depth (cm) 1 9 cm 07/22/22 0944   Wound Surface Area (cm^2) 7 5 cm^2 07/22/22 0944   Wound Volume (cm^3) 14 25 cm^3 07/22/22 0944   Calculated Wound Volume (cm^3) 14 25 cm^3 07/22/22 0944   Change in Wound Size % 50 52 07/22/22 0944   Tunneling in depth located at 5 2 @ 10:00, 5 @ 3:00 connecting to perineal wound 07/22/22 0944   Undermining 2 6 07/22/22 0944   Undermining is depth extending from 7-9 07/22/22 0944   Drainage Amount Large 07/22/22 0944   Drainage Description Yellow 07/22/22 0944   Non-staged Wound Description Full thickness 07/22/22 0944   Treatments Irrigation with NSS 07/22/22 0944       Wound 08/26/20 Pressure Injury Hip Left (Active)   Wound Image Images linked 07/22/22 0941   Wound Description Pink;Pale;Epithelialization;Yellow (skin bridge) 07/22/22 0950   Shelby-wound Assessment Scar Tissue; Hyperpigmented 07/22/22 0950   Wound Length (cm) 0 8 cm 07/22/22 0950   Wound Width (cm) 0 5 cm 07/22/22 0950   Wound Depth (cm) 0 2 cm 07/22/22 0950   Wound Surface Area (cm^2) 0 4 cm^2 07/22/22 0950   Wound Volume (cm^3) 0 08 cm^3 07/22/22 0950   Calculated Wound Volume (cm^3) 0 08 cm^3 07/22/22 0950   Change in Wound Size % 65 22 07/22/22 0950   Undermining 0 4 07/22/22 0950   Undermining is depth extending from 1-4 07/22/22 0950   Drainage Amount Small 07/22/22 0950   Drainage Description Yellow 07/22/22 0950   Non-staged Wound Description Full thickness 07/22/22 0950   Treatments Irrigation with NSS 07/22/22 0950       Wound 07/29/21 Pressure Injury Back Right; Lower; Lateral (Active)   Wound Image Images linked 07/22/22 1018   Wound Description Necrotic;Slough;Pink;Probes to bone 07/22/22 1000   Shelby-wound Assessment Intact;Scar Tissue; Hyperpigmented 07/22/22 1000   Wound Length (cm) 4 5 cm 07/22/22 1000   Wound Width (cm) 8 5 cm 07/22/22 1000   Wound Depth (cm) 1 6 cm 07/22/22 1000   Wound Surface Area (cm^2) 38 25 cm^2 07/22/22 1000   Wound Volume (cm^3) 61 2 cm^3 07/22/22 1000   Calculated Wound Volume (cm^3) 61 2 cm^3 07/22/22 1000   Change in Wound Size % -252 53 07/22/22 1000   Tunneling in depth located at 7 @ 11:00 07/22/22 1000   Drainage Amount Copious 07/22/22 1000   Drainage Description Tan;Purulent; Foul smelling 07/22/22 1000   Non-staged Wound Description Full thickness 07/22/22 1000   Treatments Irrigation with NSS 07/22/22 1000       Wound 11/05/21 Pressure Injury Perineum (Active)   Wound Image Images linked 07/22/22 0941   Wound Description Pink 07/22/22 0954   Shelby-wound Assessment Scar Tissue; Hyperpigmented; Intact 07/22/22 0954   Wound Length (cm) 1 5 cm 07/22/22 0954   Wound Width (cm) 2 3 cm 07/22/22 0954   Wound Depth (cm) 2 1 cm 07/22/22 0954   Wound Surface Area (cm^2) 3 45 cm^2 07/22/22 0954   Wound Volume (cm^3) 7 245 cm^3 07/22/22 0954   Calculated Wound Volume (cm^3) 7 25 cm^3 07/22/22 0954   Change in Wound Size % -7150 07/22/22 0954   Tunneling in depth located at 9:00 connecting to left buttock,  3 @ 12:00 07/22/22 0954   Undermining 5 2 07/22/22 0954   Undermining is depth extending from 2-4 07/22/22 0954   Drainage Amount Large 07/22/22 0954   Drainage Description Yellow 07/22/22 0954   Non-staged Wound Description Full thickness 07/22/22 0954   Treatments Irrigation with NSS 07/22/22 0954       Wound 05/27/22 Skin Tear Buttocks Left;Proximal (Active)   Wound Image Images linked 07/22/22 1019   Wound Description Epithelialization 07/22/22 0943   Shelby-wound Assessment Scar Tissue 07/22/22 0943   Wound Length (cm) 0 cm 07/22/22 0943   Wound Width (cm) 0 cm 07/22/22 0943   Wound Depth (cm) 0 cm 07/22/22 0943   Wound Surface Area (cm^2) 0 cm^2 07/22/22 0943   Wound Volume (cm^3) 0 cm^3 07/22/22 0943   Calculated Wound Volume (cm^3) 0 cm^3 07/22/22 0943   Change in Wound Size % 100 07/22/22 0943   Drainage Amount None 07/22/22 0943       BP 94/56   Pulse 72   Temp 97 9 °F (36 6 °C)   Resp 16     Physical Exam  Vitals and nursing note reviewed  Exam conducted with a chaperone present  Constitutional:       Appearance: Normal appearance  Cardiovascular:      Rate and Rhythm: Normal rate and regular rhythm  Pulmonary:      Effort: Pulmonary effort is normal       Breath sounds: Normal breath sounds  Abdominal:      General: There is no distension  Palpations: Abdomen is soft  There is no mass  Tenderness: There is no abdominal tenderness  Comments: Ostomy   Musculoskeletal:      Comments: Paraplegia  Right hip disarticulation and removal   Skin:     General: Skin is warm and dry  Comments: See complete wound assessment   Neurological:      Mental Status: He is alert  Psychiatric:         Mood and Affect: Mood normal          Behavior: Behavior normal            Wound Instructions:  Orders Placed This Encounter   Procedures    Wound cleansing and dressings         Wound cleansing and dressings          Left buttock:  Cleanse/Irrigate wound with Normal Saline with next dressing change      Apply skin prep to periwound  Gently pack wound with mesalt rope  Tape tail  Cover with alginate  Cover with allevyn sacral bordered foam   Change dressing three times a week or as needed for drainage      Wound Left Hip  Cleanse/Irrigate wound with normal saline  Apply skin prep to periwound  Cover with alginate  Apply primary dressing: -allevyn bordered foam to hip wound and skin tear on hip  Change three times a week or as needed for drainage      R lateral back:  Cleanse with normal saline  Gently pack with mesalt rope  Tape the tail  Cover yellow/tan area with alginate  Cover with allevyn border foam   Change three times a week or as needed for drainage      Perinium:  Skin prep to periwound  Gently pack with mesalt rope  Extend into left buttock and Tape tail  Cover with allevyn life  Change three times a week or as needed for drainage  CONTACT [employer] to assess chair  You need to keep pressure off of right back wound         HOME CARE  Continue home care services/skilled nursing - 3 x per week (family to do in between), daily visits for one week to pack right back wound      OFF-LOADING  Avoid pressure at wound site  - all wounds, including right back  Wheelchair Cushion  - ROHO cushion  Do Not Sit for Long Periods of Time  - 1 hr max for meals only, otherwise in bed and turn side to side at least  every 3 hours or more frequently  Reposition in regular bed for now at least every 1-2 hours while awake      Follow up in 2 weeks      Today's Wound treatment note: Cleansed with NSS and dressed as above  Standing Status:   Future     Standing Expiration Date:   7/22/2023    Debridement     This order was created via procedure documentation    Debridement     This order was created via procedure documentation        Diagnosis ICD-10-CM Associated Orders   1  Open wound of buttock, left, initial encounter  S31 829A Wound cleansing and dressings   2   Pressure ulcer of right lower back, stage 3 ([facility] [address] ) L89 133 lidocaine (LMX) 4 % cream     Wound cleansing and dressings   3  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)  Z72 402 Wound cleansing and dressings   4  Left perineal ischial pressure ulcer, stage IV ([facility])  L89 324 Wound cleansing and dressings   5   Paraplegic spinal paralysis ([facility] [address] )  G82 20

## 2022-07-22 NOTE — ASSESSMENT & PLAN NOTE
The wound is slightly better with more skin growing over the inferior portion    Continue the same care

## 2022-07-22 NOTE — ASSESSMENT & PLAN NOTE
There has been significant changes to the wound on his right back/chest wall  There was a large area with signs of ongoing pressure  There is a portion with thick black firm eschar and the portion with yellow tan fibrous tissue that was debrided  The wound probes underneath this area and there was a large cavity underneath the skin  Will attempt to use Mesalt packing  This looks like ongoing pressure possibly from the arm of his wheelchair  I really recommend he have his wheelchair re-evaluated for appropriate fit  Explained the importance of keeping off of the arm rest   I told with the pain increases or develops fevers chills or redness he needs to be seen urgently or in the emergency department  There is a high chance that lot of the skin will breakdown due to the pressure injury  Will continue to monitor and follow up in 2 weeks

## 2022-07-22 NOTE — ASSESSMENT & PLAN NOTE
This wound tracks under the hip wound and also tracks all the over to the perineum wound which then also tracks to 12 o'clock  The base was debrided with a curette  Will continue the same care

## 2022-07-23 VITALS
RESPIRATION RATE: 20 BRPM | DIASTOLIC BLOOD PRESSURE: 60 MMHG | HEART RATE: 82 BPM | OXYGEN SATURATION: 98 % | SYSTOLIC BLOOD PRESSURE: 118 MMHG | TEMPERATURE: 97.6 F

## 2022-07-25 ENCOUNTER — HOME CARE VISIT (OUTPATIENT)
Dept: HOME HEALTH SERVICES | Facility: HOME HEALTHCARE | Age: 62
End: 2022-07-25
Payer: MEDICARE

## 2022-07-25 VITALS
DIASTOLIC BLOOD PRESSURE: 62 MMHG | TEMPERATURE: 98.1 F | OXYGEN SATURATION: 98 % | HEART RATE: 96 BPM | SYSTOLIC BLOOD PRESSURE: 96 MMHG | RESPIRATION RATE: 16 BRPM

## 2022-07-25 PROCEDURE — G0300 HHS/HOSPICE OF LPN EA 15 MIN: HCPCS

## 2022-07-26 ENCOUNTER — HOME CARE VISIT (OUTPATIENT)
Dept: HOME HEALTH SERVICES | Facility: HOME HEALTHCARE | Age: 62
End: 2022-07-26
Payer: MEDICARE

## 2022-07-26 NOTE — CASE COMMUNICATION
Ship to x Pt  Home  Insurance [facility]     Wound 1 x      Full x       Partial   Wound 2 x      Full x       Partial   16fr 5cc cath [id number] 1   Syringe 60cc [id number] 2  Red rubber cath    2   10cc cath tray [id number] 1   Bard Flip Chan Leg bag med  1   Bard Flip chan Leg bag large  1   Gauze 4x4 N/S 200 4x4s per unit  [id number] 1   ABD 5x9 [id number]  Tape   Mefix 2inch [id number]  Mesalt ribbon ¾ x 39 [id number]  Silicone Foam with border 4x4 NOT STOCK ED [id number]    24  Silicone Foam sacral NOT STOCKED [id number]     9  Medium [id number] 1

## 2022-07-27 ENCOUNTER — HOME CARE VISIT (OUTPATIENT)
Dept: HOME HEALTH SERVICES | Facility: HOME HEALTHCARE | Age: 62
End: 2022-07-27
Payer: MEDICARE

## 2022-07-27 PROCEDURE — 10330064 DRESSING, MESALT DEBRIDER STR 3/4"X39" (

## 2022-07-27 PROCEDURE — 10330064 FOAM, ADH SIL W/BORDER 4"X4" (10/BX 20BX

## 2022-07-27 PROCEDURE — 10330064 CATHETER, FOLEY 2WAY 5CC 16FR (10/BX)

## 2022-07-27 PROCEDURE — 10330064 PAD, ABD 5X9" STR LF (1/PK 20PK/BX) MGM1

## 2022-07-27 PROCEDURE — 10330064 FOAM, ADH SIL W/BORDER SACRAL 7"X7" (10/

## 2022-07-27 PROCEDURE — 10330064 GLOVE, EXAM VNYL MED N/S (100/BX 10BX/CS

## 2022-07-27 PROCEDURE — 10330064 SPONGE, GAUZE 8PLY N/S 4"X4" (200/PK 20P

## 2022-07-27 PROCEDURE — 10330064

## 2022-07-27 PROCEDURE — G0299 HHS/HOSPICE OF RN EA 15 MIN: HCPCS

## 2022-07-27 PROCEDURE — 10330064 TAPE, RETENTION 2"X10YDS (1/BX24BX/CS)

## 2022-07-27 PROCEDURE — 10330064 SYRINGE, CATH TIP FLAT STR 60CC (50/CS)

## 2022-07-27 PROCEDURE — 10330064 CATH TRAY, FOLEY SYR 10CC (20/CS)

## 2022-07-28 VITALS
DIASTOLIC BLOOD PRESSURE: 70 MMHG | TEMPERATURE: 98 F | OXYGEN SATURATION: 97 % | SYSTOLIC BLOOD PRESSURE: 112 MMHG | HEART RATE: 109 BPM | RESPIRATION RATE: 16 BRPM

## 2022-07-29 ENCOUNTER — HOME CARE VISIT (OUTPATIENT)
Dept: HOME HEALTH SERVICES | Facility: HOME HEALTHCARE | Age: 62
End: 2022-07-29
Payer: MEDICARE

## 2022-07-29 VITALS
DIASTOLIC BLOOD PRESSURE: 68 MMHG | HEART RATE: 76 BPM | RESPIRATION RATE: 18 BRPM | OXYGEN SATURATION: 100 % | TEMPERATURE: 98 F | SYSTOLIC BLOOD PRESSURE: 128 MMHG

## 2022-07-29 PROCEDURE — 400014 VN F/U

## 2022-07-29 PROCEDURE — G0300 HHS/HOSPICE OF LPN EA 15 MIN: HCPCS

## 2022-08-01 ENCOUNTER — HOME CARE VISIT (OUTPATIENT)
Dept: HOME HEALTH SERVICES | Facility: HOME HEALTHCARE | Age: 62
End: 2022-08-01
Payer: MEDICARE

## 2022-08-01 PROCEDURE — G0299 HHS/HOSPICE OF RN EA 15 MIN: HCPCS

## 2022-08-03 ENCOUNTER — HOME CARE VISIT (OUTPATIENT)
Dept: HOME HEALTH SERVICES | Facility: HOME HEALTHCARE | Age: 62
End: 2022-08-03
Payer: MEDICARE

## 2022-08-03 VITALS
TEMPERATURE: 98.2 F | OXYGEN SATURATION: 97 % | HEART RATE: 72 BPM | SYSTOLIC BLOOD PRESSURE: 118 MMHG | RESPIRATION RATE: 18 BRPM | DIASTOLIC BLOOD PRESSURE: 70 MMHG

## 2022-08-03 PROCEDURE — G0299 HHS/HOSPICE OF RN EA 15 MIN: HCPCS

## 2022-08-05 ENCOUNTER — OFFICE VISIT (OUTPATIENT)
Dept: WOUND CARE | Facility: CLINIC | Age: 62
End: 2022-08-05
Payer: MEDICARE

## 2022-08-05 VITALS
DIASTOLIC BLOOD PRESSURE: 60 MMHG | TEMPERATURE: 96.9 F | SYSTOLIC BLOOD PRESSURE: 90 MMHG | HEART RATE: 68 BPM | RESPIRATION RATE: 18 BRPM

## 2022-08-05 VITALS
OXYGEN SATURATION: 97 % | HEART RATE: 76 BPM | SYSTOLIC BLOOD PRESSURE: 110 MMHG | TEMPERATURE: 97.9 F | DIASTOLIC BLOOD PRESSURE: 70 MMHG | RESPIRATION RATE: 18 BRPM

## 2022-08-05 DIAGNOSIS — S31.829A: ICD-10-CM

## 2022-08-05 DIAGNOSIS — L89.133 PRESSURE ULCER OF RIGHT LOWER BACK, STAGE 3 (HCC): Primary | ICD-10-CM

## 2022-08-05 DIAGNOSIS — L89.223 STAGE III PRESSURE ULCER OF LEFT HIP (HCC): ICD-10-CM

## 2022-08-05 DIAGNOSIS — L89.134 STAGE IV PRESSURE ULCER OF RIGHT LOWER BACK (HCC): ICD-10-CM

## 2022-08-05 DIAGNOSIS — G82.20 PARAPLEGIC SPINAL PARALYSIS (HCC): ICD-10-CM

## 2022-08-05 DIAGNOSIS — L89.324 LEFT PERINEAL ISCHIAL PRESSURE ULCER, STAGE IV (HCC): ICD-10-CM

## 2022-08-05 PROCEDURE — 10330064 WIPE, SKIN GEL PROT DRSNG (50/BX)

## 2022-08-05 PROCEDURE — 10330064 DRESSING, WND ALLEVYN LIFE SACRUM STR SM

## 2022-08-05 PROCEDURE — 10330064 PAD, ABD 5X9" STR LF (1/PK 20PK/BX) MGM1

## 2022-08-05 PROCEDURE — 99214 OFFICE O/P EST MOD 30 MIN: CPT | Performed by: SURGERY

## 2022-08-05 PROCEDURE — 10330064

## 2022-08-05 PROCEDURE — 10330064 TAPE, RETENTION 2"X10YDS (1/BX24BX/CS)

## 2022-08-05 PROCEDURE — 10330064 DRESSING, CALCIUM ALGINATE AG SHEET 4"X4

## 2022-08-05 PROCEDURE — 10330064 SPONGE, GAUZE 8PLY N/S 4"X4" (200/PK 20P

## 2022-08-05 PROCEDURE — 11042 DBRDMT SUBQ TIS 1ST 20SQCM/<: CPT | Performed by: SURGERY

## 2022-08-05 PROCEDURE — 10330064 DRESSING, MESALT DEBRIDER STR 3/4"X39" (

## 2022-08-05 PROCEDURE — 10330064 DRESSING, HYDROCELLULAR ADH STR FOAM 4"X

## 2022-08-05 NOTE — PROGRESS NOTES
Patient ID: Lonita Collet is a 65 y o  male Date of Birth 1960     Chief Complaint  Chief Complaint   Patient presents with    Follow Up Wound Care Visit     Open wound of buttock, left       Allergies  Patient has no known allergies  Assessment:    Stage IV pressure ulcer of right lower back (HCC)  The pressure wound on his right back along the ribs continues to deteriorate  The wound now opens and extends deep to the ribs into a large cavity full of brown tan purulent drainage  The superficial area is leathery and nonviable  The old opening connects to the large cavity as well  This point I believe this needs to be likely debrided to the operating room to take out all the necrotic tissue and create 1 open wound in order to access the deep space  May benefit from a VAC dressing in this area  I recommend he go to emergency department today and he states he is agreeable with this recommendation  Although no evidence of cellulitis nor see systemically ill I am concerned about the wound progressing and eventually needs more aggressive debridement that cannot be performed in the wound center as an outpatient basis  Diagnoses and all orders for this visit:    Pressure ulcer of right lower back, stage 3 (HCC)  -     Wound cleansing and dressings; Future    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)  -     Wound cleansing and dressings; Future    Left perineal ischial pressure ulcer, stage IV (HCC)  -     Wound cleansing and dressings; Future    Open wound of buttock, left, initial encounter  -     Wound cleansing and dressings; Future    Paraplegic spinal paralysis ([address] )    Stage IV pressure ulcer of right lower back ([address] )    Other orders  -     Debridement              Debridement   Wound 07/29/21 Pressure Injury Back Right; Lower; Lateral    Universal Protocol:  Consent given by: patient  Time out: Immediately prior to procedure a "time out" was called to verify the correct patient, procedure, equipment, support staff and site/side marked as required  Performed by: physician  Debridement type: surgical  Level of debridement: subcutaneous tissue  Pain control: lidocaine 4%  Post-debridement measurements  Length (cm): 5 2  Width (cm): 8 5  Depth (cm): 4 9  Percent debrided: 20%  Surface Area (cm^2): 44 2  Area debrided (cm^2): 8 84  Volume (cm^3): 216 58  Tissue and other material debrided: subcutaneous tissue  Devitalized tissue debrided: biofilm and slough  Instrument(s) utilized: curette  Procedural pain (0-10): 1  Post-procedural pain: 1   Response to treatment: procedure was tolerated well          Plan:     Wound 08/26/20 Pressure Injury Buttocks Left (Active)   Wound Image Images linked 08/05/22 0831   Wound Description Probes to bone;Pink;Granulation tissue 08/05/22 0836   Shelby-wound Assessment Maceration; Excoriated;Scar Tissue 08/05/22 0836   Wound Length (cm) 6 cm 08/05/22 0836   Wound Width (cm) 2 cm 08/05/22 0836   Wound Depth (cm) 1 9 cm 08/05/22 0836   Wound Surface Area (cm^2) 12 cm^2 08/05/22 0836   Wound Volume (cm^3) 22 8 cm^3 08/05/22 0836   Calculated Wound Volume (cm^3) 22 8 cm^3 08/05/22 0836   Change in Wound Size % 20 83 08/05/22 0836   Tunneling in depth located at communicates to perineal wound 08/05/22 0836   Undermining 4 1 08/05/22 0836   Undermining is depth extending from 7-12 08/05/22 0836   Drainage Amount Large 08/05/22 0836   Drainage Description Tan 08/05/22 0836   Patient Tolerance Tolerated well 08/05/22 0836   Dressing Status Removed 08/05/22 0836       Wound 08/26/20 Pressure Injury Hip Left (Active)   Wound Image Images linked 08/05/22 0830   Wound Description Epithelialization;Pink 08/05/22 0834   Shelby-wound Assessment Scar Tissue; Intact 08/05/22 0834   Wound Length (cm) 1 5 cm 08/05/22 0834   Wound Width (cm) 1 7 cm 08/05/22 0834   Wound Depth (cm) 0 2 cm 08/05/22 0834   Wound Surface Area (cm^2) 2 55 cm^2 08/05/22 0834   Wound Volume (cm^3) 0 51 cm^3 08/05/22 9597   Calculated Wound Volume (cm^3) 0 51 cm^3 08/05/22 0834   Change in Wound Size % -121 74 08/05/22 0834   Undermining 0 2 08/05/22 0834   Undermining is depth extending from 6-9 08/05/22 0834   Drainage Amount Large 08/05/22 0834   Drainage Description Tan 08/05/22 0834   Patient Tolerance Tolerated well 08/05/22 0834   Dressing Status Removed 08/05/22 0834       Wound 07/29/21 Pressure Injury Back Right; Lower; Lateral (Active)   Wound Image Images linked 08/05/22 0904   Wound Description Slough; Necrotic;Pink;Granulation tissue; Hypergranulation;Probes to bone 08/05/22 0846   Shelby-wound Assessment Scar Tissue; Intact 08/05/22 0846   Wound Length (cm) 4 9 cm 08/05/22 0846   Wound Width (cm) 8 5 cm 08/05/22 0846   Wound Depth (cm) 4 9 cm 08/05/22 0846   Wound Surface Area (cm^2) 41 65 cm^2 08/05/22 0846   Wound Volume (cm^3) 204 085 cm^3 08/05/22 0846   Calculated Wound Volume (cm^3) 204 09 cm^3 08/05/22 0846   Change in Wound Size % -1075 63 08/05/22 0846   Undermining 5 2 08/05/22 0846   Undermining is depth extending from 7-11 08/05/22 0846   Drainage Amount Copious 08/05/22 0846   Drainage Description Tan 08/05/22 0846   Patient Tolerance Tolerated well 08/05/22 0846   Dressing Status Removed 08/05/22 0846       Wound 11/05/21 Pressure Injury Perineum (Active)   Wound Image Images linked 08/05/22 0832   Wound Description Pink;Pale (1 cm skin bridge  2 wounds) 08/05/22 0840   Shelby-wound Assessment Intact;Scar Tissue 08/05/22 0840   Wound Length (cm) 1 5 cm 08/05/22 0840   Wound Width (cm) 2 5 cm 08/05/22 0840   Wound Depth (cm) 1 9 cm 08/05/22 0840   Wound Surface Area (cm^2) 3 75 cm^2 08/05/22 0840   Wound Volume (cm^3) 7 125 cm^3 08/05/22 0840   Calculated Wound Volume (cm^3) 7 13 cm^3 08/05/22 0840   Change in Wound Size % -7030 08/05/22 0840   Tunneling in depth located at communicates to left buttock, 5cm at 2 oclock 08/05/22 0840   Drainage Amount Large 08/05/22 0840   Drainage Description Kishan Tovar 08/05/22 0840   Patient Tolerance Tolerated well 08/05/22 0840   Dressing Status Removed 08/05/22 0840       [REMOVED] Wound 05/27/22 Skin Tear Buttocks Left;Proximal (Removed)   Wound Image Images linked 08/05/22 0833       Wound 08/26/20 Pressure Injury Buttocks Left (Active)   Date First Assessed/Time First Assessed: 08/26/20 1056   Primary Wound Type: Pressure Injury  Location: Buttocks  Wound Location Orientation: Left  Wound Outcome: Palliative       Wound 08/26/20 Pressure Injury Hip Left (Active)   Date First Assessed/Time First Assessed: 08/26/20 1057   Primary Wound Type: Pressure Injury  Location: Hip  Wound Location Orientation: Left  Wound Outcome: Palliative       Wound 07/29/21 Pressure Injury Back Right; Lower; Lateral (Active)   Date First Assessed: 07/29/21   Primary Wound Type: Pressure Injury  Location: Back  Wound Location Orientation: Right; Lower; Lateral  Wound Outcome: Palliative       Wound 11/05/21 Pressure Injury Perineum (Active)   Date First Assessed/Time First Assessed: 11/05/21 1059   Primary Wound Type: Pressure Injury  Location: Perineum  Wound Outcome: Palliative       [REMOVED] Wound 08/26/19 Buttocks Left;Distal;Lateral (Removed)   Resolved Date/Resolved Time: 08/26/20 1056  Date First Assessed/Time First Assessed: 08/26/19 1130   Pre-Existing Wound: Yes  Location: Buttocks  Wound Location Orientation: Left;Distal;Lateral  Wound Description (Comments): Deep ischial wound       [REMOVED] Wound 09/16/19 Buttocks Right;Distal (Removed)   Resolved Date/Resolved Time: 08/26/20 1055  Date First Assessed/Time First Assessed: 09/16/19 1657   Pre-Existing Wound: Yes  Location: Buttocks  Wound Location Orientation: Right;Distal       [REMOVED] Wound 10/28/19 Knee Left (Removed)   Resolved Date/Resolved Time: 08/26/20 1055  Date First Assessed/Time First Assessed: 10/28/19 0657   Pre-Existing Wound: Yes  Location: Knee  Wound Location Orientation: Left  Wound Description (Comments): round black Dressing Status: Open to air       [REMOVED] Wound 10/28/19 Buttocks Left;Mid (Removed)   Resolved Date/Resolved Time: 08/26/20 1056  Date First Assessed/Time First Assessed: 10/28/19 1108   Pre-Existing Wound: Yes  Location: Buttocks  Wound Location Orientation: Left;Mid  Wound Description (Comments): Stage 2 vs traumatic injury       [REMOVED] Wound 11/01/19 Other (Comment) N/A (Removed)   Resolved Date/Resolved Time: 08/26/20 1055  Date First Assessed/Time First Assessed: 11/01/19 1640   Location: Other (Comment)  Wound Location Orientation: N/A  Wound Description (Comments): scrotum dressed with exofin       [REMOVED] Wound 05/27/22 Skin Tear Buttocks Left;Proximal (Removed)   Resolved Date/Resolved Time: 08/05/22 0851  Date First Assessed/Time First Assessed: 05/27/22 0946   Primary Wound Type: Skin Tear  Location: Buttocks  Wound Location Orientation: Left;Proximal  Wound Outcome: Healed       Subjective:        Mr Patricia Bhatia is a 81-year-old gentleman with paraplegia and history of multiple pressure wounds with 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  He had been rescheduled for a debridement and flap closure by plastics in August but developed a new wound on his right mid to lower back chest wall  This wound probes deep and has been closed on the outside but the tract has not been improving  He had a CT scan that shows a deep tracking to the muscle but no fistulization to the internal thoracic cavity  02/04/2022 he returns now for re-evaluation  He still has had visiting nurses but has not been seen in the [address] [facility] since November  He denies any change or complaint    03/11/2022 no changes since been seen last   No new complaints  04/22/2022 no issues  No changes  05/27/2022  Doing well  Denies fevers or chills  No issues spoke about plastics a re-evaluation but he wants to wait for COVID to wane more and maybe next fall    07/22/2022    He has not been seen here since May mostly due to transportation issues  He has significant more pain and drainage on his right back chest wall wound  Otherwise no changes    08/05/2022 denies fevers or chills  Still in pain on the right side  The following portions of the patient's history were reviewed and updated as appropriate: allergies, current medications, past family history, past medical history, past social history, past surgical history and problem list     Review of Systems   Constitutional: Negative for chills and fever  HENT: Negative for ear pain and sore throat  Eyes: Negative for pain and visual disturbance  Respiratory: Negative for cough and shortness of breath  Cardiovascular: Negative for chest pain and palpitations  Gastrointestinal: Negative for abdominal pain and vomiting  Skin: Negative for color change and rash  Neurological: Positive for weakness and numbness  Psychiatric/Behavioral: Negative for agitation and behavioral problems  All other systems reviewed and are negative  Objective:       Wound 08/26/20 Pressure Injury Buttocks Left (Active)   Wound Image Images linked 08/05/22 0831   Wound Description Probes to bone;Pink;Granulation tissue 08/05/22 0836   Shelby-wound Assessment Maceration; Excoriated;Scar Tissue 08/05/22 0836   Wound Length (cm) 6 cm 08/05/22 0836   Wound Width (cm) 2 cm 08/05/22 0836   Wound Depth (cm) 1 9 cm 08/05/22 0836   Wound Surface Area (cm^2) 12 cm^2 08/05/22 0836   Wound Volume (cm^3) 22 8 cm^3 08/05/22 0836   Calculated Wound Volume (cm^3) 22 8 cm^3 08/05/22 0836   Change in Wound Size % 20 83 08/05/22 0836   Tunneling in depth located at communicates to perineal wound 08/05/22 0836   Undermining 4 1 08/05/22 0836   Undermining is depth extending from 7-12 08/05/22 0836   Drainage Amount Large 08/05/22 0836   Drainage Description Tan 08/05/22 0836   Patient Tolerance Tolerated well 08/05/22 0836   Dressing Status Removed 08/05/22 0836       Wound 08/26/20 Pressure Injury Hip Left (Active)   Wound Image Images linked 08/05/22 0830   Wound Description Epithelialization;Pink 08/05/22 0834   Shelby-wound Assessment Scar Tissue; Intact 08/05/22 0834   Wound Length (cm) 1 5 cm 08/05/22 0834   Wound Width (cm) 1 7 cm 08/05/22 0834   Wound Depth (cm) 0 2 cm 08/05/22 0834   Wound Surface Area (cm^2) 2 55 cm^2 08/05/22 0834   Wound Volume (cm^3) 0 51 cm^3 08/05/22 0834   Calculated Wound Volume (cm^3) 0 51 cm^3 08/05/22 0834   Change in Wound Size % -121 74 08/05/22 0834   Undermining 0 2 08/05/22 0834   Undermining is depth extending from 6-9 08/05/22 0834   Drainage Amount Large 08/05/22 0834   Drainage Description Tan 08/05/22 0834   Patient Tolerance Tolerated well 08/05/22 0834   Dressing Status Removed 08/05/22 0834       Wound 07/29/21 Pressure Injury Back Right; Lower; Lateral (Active)   Wound Image Images linked 08/05/22 0904   Wound Description Slough; Necrotic;Pink;Granulation tissue; Hypergranulation;Probes to bone 08/05/22 0846   Shelby-wound Assessment Scar Tissue; Intact 08/05/22 0846   Wound Length (cm) 4 9 cm 08/05/22 0846   Wound Width (cm) 8 5 cm 08/05/22 0846   Wound Depth (cm) 4 9 cm 08/05/22 0846   Wound Surface Area (cm^2) 41 65 cm^2 08/05/22 0846   Wound Volume (cm^3) 204 085 cm^3 08/05/22 0846   Calculated Wound Volume (cm^3) 204 09 cm^3 08/05/22 0846   Change in Wound Size % -1075 63 08/05/22 0846   Undermining 5 2 08/05/22 0846   Undermining is depth extending from 7-11 08/05/22 0846   Drainage Amount Copious 08/05/22 0846   Drainage Description Tan 08/05/22 0846   Patient Tolerance Tolerated well 08/05/22 0846   Dressing Status Removed 08/05/22 0846       Wound 11/05/21 Pressure Injury Perineum (Active)   Wound Image Images linked 08/05/22 0832   Wound Description Pink;Pale (1 cm skin bridge  2 wounds) 08/05/22 0840   Shelby-wound Assessment Intact;Scar Tissue 08/05/22 0840   Wound Length (cm) 1 5 cm 08/05/22 0840   Wound Width (cm) 2 5 cm 08/05/22 0840   Wound Depth (cm) 1 9 cm 08/05/22 0840   Wound Surface Area (cm^2) 3 75 cm^2 08/05/22 0840   Wound Volume (cm^3) 7 125 cm^3 08/05/22 0840   Calculated Wound Volume (cm^3) 7 13 cm^3 08/05/22 0840   Change in Wound Size % -7030 08/05/22 0840   Tunneling in depth located at communicates to left buttock, 5cm at 2 oclock 08/05/22 0840   Drainage Amount Large 08/05/22 0840   Drainage Description Tan 08/05/22 0840   Patient Tolerance Tolerated well 08/05/22 0840   Dressing Status Removed 08/05/22 0840       [REMOVED] Wound 05/27/22 Skin Tear Buttocks Left;Proximal (Removed)   Wound Image Images linked 08/05/22 0833       BP 90/60   Pulse 68   Temp (!) 96 9 °F (36 1 °C)   Resp 18     Physical Exam  Vitals and nursing note reviewed  Exam conducted with a chaperone present  Constitutional:       Appearance: Normal appearance  Cardiovascular:      Rate and Rhythm: Normal rate and regular rhythm  Pulmonary:      Effort: Pulmonary effort is normal       Breath sounds: Normal breath sounds  Abdominal:      General: There is no distension  Palpations: Abdomen is soft  There is no mass  Tenderness: There is no abdominal tenderness  Comments: Ostomy   Musculoskeletal:      Comments: Paraplegia  Right hip disarticulation and removal   Skin:     General: Skin is warm and dry  Comments: See complete wound assessment   Neurological:      Mental Status: He is alert  Psychiatric:         Mood and Affect: Mood normal          Behavior: Behavior normal            Wound Instructions:  Orders Placed This Encounter   Procedures    Wound cleansing and dressings     Wound cleansing and dressings          Left buttock:  Cleanse/Irrigate wound with Normal Saline with next dressing change  Apply skin prep to periwound  Gently pack wound with mesalt rope  Tape tail  Cover with alginate    Cover with allevyn sacral bordered foam   Change dressing three times a week or as needed for drainage      Wound Left Hip  Cleanse/Irrigate wound with normal saline  Apply skin prep to periwound  Cover with alginate  Apply primary dressing: -allevyn bordered foam to hip wound and skin tear on hip  Change three times a week or as needed for drainage      R lateral back:  STOP mesalt rope  Gently pack with dakin's moistened gauze  Tape the tail  Cover yellow/tan area with alginate  Cover with allevyn border foam   Change daily or as needed for drainage      Perinium:  Skin prep to periwound  Gently pack with mesalt rope  Extend into left buttock and Tape tail  Cover with allevyn life  Change three times a week or as needed for drainage      CONTACT [employer] to assess chair  You need to keep pressure off of right back wound          HOME CARE  Continue home care services/skilled nursing - 3 x per week (family to do in between), daily visits for one week to pack right back wound      OFF-LOADING  Avoid pressure at wound site  - all wounds, including right back  Wheelchair Cushion  - ROHO cushion  Do Not Sit for Long Periods of Time  - 1 hr max for meals only, otherwise in bed and turn side to side at least  every 3 hours or more frequently  Reposition in regular bed for now at least every 1-2 hours while awake      Patient is to go to the [facility]  for evaluation  Follow up after discharge from hospital      Today's Wound treatment note: Cleansed with NSS and dressed as above  Standing Status:   Future     Standing Expiration Date:   8/5/2023    Debridement     This order was created via procedure documentation        Diagnosis ICD-10-CM Associated Orders   1  Pressure ulcer of right lower back, stage 3 ([facility])  L89 133 Wound cleansing and dressings   2  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 ([facility])  E66 787 Wound cleansing and dressings   3  Left perineal ischial pressure ulcer, stage IV ([facility])  L89 324 Wound cleansing and dressings   4   Open wound of buttock, left, initial encounter  S31 189W Wound cleansing and dressings   5  Paraplegic spinal paralysis ([facility] )  G82 20    6   Stage IV pressure ulcer of right lower back ([facility] )  L89 134

## 2022-08-05 NOTE — PATIENT INSTRUCTIONS
Orders Placed This Encounter   Procedures    Wound cleansing and dressings     Wound cleansing and dressings          Left buttock:  Cleanse/Irrigate wound with Normal Saline with next dressing change  Apply skin prep to periwound  Gently pack wound with mesalt rope  Tape tail  Cover with alginate  Cover with allevyn sacral bordered foam   Change dressing three times a week or as needed for drainage  Wound Left Hip  Cleanse/Irrigate wound with normal saline  Apply skin prep to periwound  Cover with alginate  Apply primary dressing: -allevyn bordered foam to hip wound and skin tear on hip  Change three times a week or as needed for drainage  R lateral back:  STOP mesalt rope  Gently pack with dakin's moistened gauze  Tape the tail  Cover yellow/tan area with alginate  Cover with allevyn border foam   Change daily or as needed for drainage  Perinium:  Skin prep to periwound  Gently pack with mesalt rope  Extend into left buttock and Tape tail  Cover with allevyn life  Change three times a week or as needed for drainage  CONTACT [employer] to assess chair  You need to keep pressure off of right back wound  HOME CARE  Continue home care services/skilled nursing - 3 x per week (family to do in between), daily visits for one week to pack right back wound      OFF-LOADING  Avoid pressure at wound site  - all wounds, including right back  Wheelchair Cushion  - ROHO cushion  Do Not Sit for Long Periods of Time  - 1 hr max for meals only, otherwise in bed and turn side to side at least  every 3 hours or more frequently  Reposition in regular bed for now at least every 1-2 hours while awake  Patient is to go to the E [facility]  for evaluation  Follow up after discharge from hospital      Today's Wound treatment note: Cleansed with NSS and dressed as above       Standing Status:   Future     Standing Expiration Date:   8/5/2023 Future     Standing Expiration Date:   8/29/2023

## 2022-08-05 NOTE — ASSESSMENT & PLAN NOTE
The pressure wound on his right back along the ribs continues to deteriorate  The wound now opens and extends deep to the ribs into a large cavity full of brown tan purulent drainage  The superficial area is leathery and nonviable  The old opening connects to the large cavity as well  This point I believe this needs to be likely debrided to the operating room to take out all the necrotic tissue and create 1 open wound in order to access the deep space  May benefit from a VAC dressing in this area  I recommend he go to emergency department today and he states he is agreeable with this recommendation  Although no evidence of cellulitis nor see systemically ill I am concerned about the wound progressing and eventually needs more aggressive debridement that cannot be performed in the wound center as an outpatient basis

## 2022-08-08 ENCOUNTER — HOME CARE VISIT (OUTPATIENT)
Dept: HOME HEALTH SERVICES | Facility: HOME HEALTHCARE | Age: 62
End: 2022-08-08
Payer: MEDICARE

## 2022-08-08 PROCEDURE — G0299 HHS/HOSPICE OF RN EA 15 MIN: HCPCS

## 2022-08-10 ENCOUNTER — HOME CARE VISIT (OUTPATIENT)
Dept: HOME HEALTH SERVICES | Facility: HOME HEALTHCARE | Age: 62
End: 2022-08-10
Payer: MEDICARE

## 2022-08-10 PROCEDURE — G0299 HHS/HOSPICE OF RN EA 15 MIN: HCPCS

## 2022-08-11 VITALS
DIASTOLIC BLOOD PRESSURE: 68 MMHG | HEART RATE: 78 BPM | OXYGEN SATURATION: 98 % | RESPIRATION RATE: 18 BRPM | SYSTOLIC BLOOD PRESSURE: 118 MMHG | TEMPERATURE: 98 F

## 2022-08-12 ENCOUNTER — HOME CARE VISIT (OUTPATIENT)
Dept: HOME HEALTH SERVICES | Facility: HOME HEALTHCARE | Age: 62
End: 2022-08-12
Payer: MEDICARE

## 2022-08-12 VITALS
SYSTOLIC BLOOD PRESSURE: 100 MMHG | DIASTOLIC BLOOD PRESSURE: 72 MMHG | HEART RATE: 76 BPM | RESPIRATION RATE: 14 BRPM | TEMPERATURE: 97.1 F | OXYGEN SATURATION: 98 %

## 2022-08-12 PROCEDURE — G0300 HHS/HOSPICE OF LPN EA 15 MIN: HCPCS

## 2022-08-12 PROCEDURE — 10330064 CATH TRAY, FOLEY SYR 10CC (20/CS)

## 2022-08-12 PROCEDURE — 10330064

## 2022-08-12 PROCEDURE — 10330064 CATHETER, FOLEY 2WAY 5CC 16FR (10/BX)

## 2022-08-12 NOTE — CASE COMMUNICATION
Ship to [facility]  Home   Insurance  AB   Wound 1 x Full x  Wound 2 x Full x   Syringe 60cc [id number] 2   Red rubber cath 2   Gauze 4x4 N/S 200 4x4s per unit [id number] 1   ABD 5x9 [id number] 14   Tape   Mefix 2inch [id number] 2   Mesalt ribbon ¾ x 39 [id number] 4   Silicone Foam with border 4x4 NOT STOCKED [id number] 24   Silicone Foam sacral NOT STOCKED [id number] 9  Kerlix wrap guaze   6  100ml NSS___6

## 2022-08-12 NOTE — CASE COMMUNICATION
Ship to [facility]   Home   Insurance [facility] AB   16fr 5cc cath [id number] 2   10cc cath tray [id number] 2  Bard Flip Jayesh Leg bag med 2

## 2022-08-15 ENCOUNTER — HOME CARE VISIT (OUTPATIENT)
Dept: HOME HEALTH SERVICES | Facility: HOME HEALTHCARE | Age: 62
End: 2022-08-15
Payer: MEDICARE

## 2022-08-15 VITALS
RESPIRATION RATE: 20 BRPM | TEMPERATURE: 97.6 F | OXYGEN SATURATION: 97 % | DIASTOLIC BLOOD PRESSURE: 70 MMHG | SYSTOLIC BLOOD PRESSURE: 110 MMHG | HEART RATE: 78 BPM

## 2022-08-15 PROCEDURE — G0299 HHS/HOSPICE OF RN EA 15 MIN: HCPCS

## 2022-08-16 VITALS
RESPIRATION RATE: 18 BRPM | HEART RATE: 76 BPM | SYSTOLIC BLOOD PRESSURE: 118 MMHG | OXYGEN SATURATION: 97 % | DIASTOLIC BLOOD PRESSURE: 70 MMHG | TEMPERATURE: 96.9 F

## 2022-08-17 ENCOUNTER — HOME CARE VISIT (OUTPATIENT)
Dept: HOME HEALTH SERVICES | Facility: HOME HEALTHCARE | Age: 62
End: 2022-08-17
Payer: MEDICARE

## 2022-08-17 PROCEDURE — G0299 HHS/HOSPICE OF RN EA 15 MIN: HCPCS

## 2022-08-18 DIAGNOSIS — G89.29 CHRONIC LOW BACK PAIN WITHOUT SCIATICA, UNSPECIFIED BACK PAIN LATERALITY: ICD-10-CM

## 2022-08-18 DIAGNOSIS — M54.50 CHRONIC LOW BACK PAIN WITHOUT SCIATICA, UNSPECIFIED BACK PAIN LATERALITY: ICD-10-CM

## 2022-08-19 ENCOUNTER — HOME CARE VISIT (OUTPATIENT)
Dept: HOME HEALTH SERVICES | Facility: HOME HEALTHCARE | Age: 62
End: 2022-08-19
Payer: MEDICARE

## 2022-08-19 VITALS
SYSTOLIC BLOOD PRESSURE: 118 MMHG | HEART RATE: 68 BPM | OXYGEN SATURATION: 100 % | RESPIRATION RATE: 20 BRPM | TEMPERATURE: 98 F | DIASTOLIC BLOOD PRESSURE: 68 MMHG

## 2022-08-19 VITALS
HEART RATE: 76 BPM | SYSTOLIC BLOOD PRESSURE: 110 MMHG | TEMPERATURE: 97.5 F | RESPIRATION RATE: 18 BRPM | OXYGEN SATURATION: 97 % | DIASTOLIC BLOOD PRESSURE: 70 MMHG

## 2022-08-19 PROCEDURE — G0300 HHS/HOSPICE OF LPN EA 15 MIN: HCPCS

## 2022-08-19 NOTE — CASE COMMUNICATION
Ship to  home  Branch: [email or web address] 1 left buttock   left hip  perineum      Wound 2 right back torso   All items are ordered by the each unless otherwise noted  PLEASE DO NOT ORDER BY THE BOX  Request specific quantity needed  For Private Insurances order should be for a 2 week period    [employer]  Saline 100ml [id number]  qty 4    Cleansers  Sea Clens [id number] qty 1    Dry Dressings   (Nonsterile gauze not  covered by private insurance)  (Sterile gauze may be requested for insurance rather than nonsterile gauze)  Gauze 4x4 N/S 200 4x4s per unit  967999 qty 2   Gauze Kerlix (fluff roll) 4inch sterile 104964 qty 7  ABD 5x9 239835 qty 14    Tape  Tape   Mefix 2inch [id number]  qty 2      Calcium Alginates  (In place of Aquacel or Maxsorb)    Alginate with Silver 226634  rope   qty 7      Foam Dressings  Sub for Allevyn:  sacral size adhesive foam  qty 7              Adapt skin barrier no sting wipes 50 per box  [id number] qty 1 box

## 2022-08-22 ENCOUNTER — HOME CARE VISIT (OUTPATIENT)
Dept: HOME HEALTH SERVICES | Facility: HOME HEALTHCARE | Age: 62
End: 2022-08-22
Payer: MEDICARE

## 2022-08-22 PROCEDURE — G0299 HHS/HOSPICE OF RN EA 15 MIN: HCPCS

## 2022-08-22 RX ORDER — IBUPROFEN 800 MG/1
800 TABLET ORAL EVERY 8 HOURS PRN
Qty: 60 TABLET | Refills: 0 | Status: SHIPPED | OUTPATIENT
Start: 2022-08-22 | End: 2022-09-28 | Stop reason: SDUPTHER

## 2022-08-24 ENCOUNTER — HOME CARE VISIT (OUTPATIENT)
Dept: HOME HEALTH SERVICES | Facility: HOME HEALTHCARE | Age: 62
End: 2022-08-24
Payer: MEDICARE

## 2022-08-24 PROCEDURE — G0299 HHS/HOSPICE OF RN EA 15 MIN: HCPCS

## 2022-08-25 VITALS
DIASTOLIC BLOOD PRESSURE: 60 MMHG | HEART RATE: 78 BPM | SYSTOLIC BLOOD PRESSURE: 118 MMHG | TEMPERATURE: 97.4 F | DIASTOLIC BLOOD PRESSURE: 70 MMHG | OXYGEN SATURATION: 96 % | RESPIRATION RATE: 18 BRPM | SYSTOLIC BLOOD PRESSURE: 120 MMHG | HEART RATE: 82 BPM | OXYGEN SATURATION: 97 % | RESPIRATION RATE: 18 BRPM | TEMPERATURE: 98 F

## 2022-08-29 ENCOUNTER — HOME CARE VISIT (OUTPATIENT)
Dept: HOME HEALTH SERVICES | Facility: HOME HEALTHCARE | Age: 62
End: 2022-08-29
Payer: MEDICARE

## 2022-08-29 VITALS
SYSTOLIC BLOOD PRESSURE: 130 MMHG | HEART RATE: 60 BPM | DIASTOLIC BLOOD PRESSURE: 82 MMHG | RESPIRATION RATE: 20 BRPM | TEMPERATURE: 98 F | OXYGEN SATURATION: 97 %

## 2022-08-29 DIAGNOSIS — L89.324 STAGE IV PRESSURE ULCER OF LEFT BUTTOCK (HCC): ICD-10-CM

## 2022-08-29 DIAGNOSIS — M54.50 CHRONIC LOW BACK PAIN WITHOUT SCIATICA, UNSPECIFIED BACK PAIN LATERALITY: ICD-10-CM

## 2022-08-29 DIAGNOSIS — G89.29 CHRONIC LOW BACK PAIN WITHOUT SCIATICA, UNSPECIFIED BACK PAIN LATERALITY: ICD-10-CM

## 2022-08-29 PROCEDURE — 400014 VN F/U

## 2022-08-29 PROCEDURE — G0300 HHS/HOSPICE OF LPN EA 15 MIN: HCPCS

## 2022-08-29 NOTE — CASE COMMUNICATION
Ship to  Pt  Home  Insurance  ab     Wound 1 x      Full x  Wound 2 x      Full xx  16fr 5cc cath [id number] 1   Red rubber straight cath   1  Syringe 60cc [id number] 2   10cc cath tray 751555 1   Saline 100ml 140321   6   Gauze 4x4 N/S 200 4x4s per unit  050084 2   ABD 5x9 733670 20  Tape   Mefix 2inch [id number] 2  Mesalt ribbon ¾ x 39 [id number] 6  Silicone Foam with border 4x4 NOT STOCKED 335889 10  Allevyn life Foam sacral 6

## 2022-08-30 ENCOUNTER — TELEPHONE (OUTPATIENT)
Dept: WOUND CARE | Facility: CLINIC | Age: 62
End: 2022-08-30

## 2022-08-30 ENCOUNTER — HOME CARE VISIT (OUTPATIENT)
Dept: HOME HEALTH SERVICES | Facility: HOME HEALTHCARE | Age: 62
End: 2022-08-30
Payer: MEDICARE

## 2022-08-30 DIAGNOSIS — L89.154 STAGE IV PRESSURE ULCER OF SACRAL REGION (HCC): Primary | ICD-10-CM

## 2022-08-30 DIAGNOSIS — L89.134 STAGE IV PRESSURE ULCER OF RIGHT LOWER BACK (HCC): ICD-10-CM

## 2022-08-30 RX ORDER — SODIUM HYPOCHLORITE 2.5 MG/ML
1 SOLUTION TOPICAL ONCE
Qty: 473 ML | Refills: 0 | Status: SHIPPED | OUTPATIENT
Start: 2022-08-30 | End: 2022-08-30

## 2022-08-30 NOTE — TELEPHONE ENCOUNTER
Received call from Cira Fajardo from [facility] requesting an order for a  and script for [facility] for wound care  MD made aware  Returned Ryanne's call to update  No answer at this time  Message left

## 2022-08-31 ENCOUNTER — HOME CARE VISIT (OUTPATIENT)
Dept: HOME HEALTH SERVICES | Facility: HOME HEALTHCARE | Age: 62
End: 2022-08-31
Payer: MEDICARE

## 2022-08-31 PROCEDURE — G0299 HHS/HOSPICE OF RN EA 15 MIN: HCPCS

## 2022-08-31 RX ORDER — OXYCODONE HYDROCHLORIDE 20 MG/1
20 TABLET ORAL 3 TIMES DAILY PRN
Qty: 90 TABLET | Refills: 0 | Status: SHIPPED | OUTPATIENT
Start: 2022-08-31 | End: 2022-09-28 | Stop reason: SDUPTHER

## 2022-09-01 VITALS
SYSTOLIC BLOOD PRESSURE: 110 MMHG | TEMPERATURE: 98 F | OXYGEN SATURATION: 97 % | RESPIRATION RATE: 20 BRPM | HEART RATE: 82 BPM | DIASTOLIC BLOOD PRESSURE: 60 MMHG

## 2022-09-02 ENCOUNTER — CONSULT (OUTPATIENT)
Dept: GASTROENTEROLOGY | Facility: MEDICAL CENTER | Age: 62
End: 2022-09-02
Payer: MEDICARE

## 2022-09-02 ENCOUNTER — TELEPHONE (OUTPATIENT)
Dept: GASTROENTEROLOGY | Facility: MEDICAL CENTER | Age: 62
End: 2022-09-02

## 2022-09-02 ENCOUNTER — HOME CARE VISIT (OUTPATIENT)
Dept: HOME HEALTH SERVICES | Facility: HOME HEALTHCARE | Age: 62
End: 2022-09-02
Payer: MEDICARE

## 2022-09-02 VITALS — TEMPERATURE: 99.2 F | SYSTOLIC BLOOD PRESSURE: 110 MMHG | HEART RATE: 105 BPM | DIASTOLIC BLOOD PRESSURE: 76 MMHG

## 2022-09-02 DIAGNOSIS — R19.5 HEME POSITIVE STOOL: Primary | ICD-10-CM

## 2022-09-02 PROCEDURE — 99203 OFFICE O/P NEW LOW 30 MIN: CPT | Performed by: PHYSICIAN ASSISTANT

## 2022-09-02 NOTE — TELEPHONE ENCOUNTER
Colonoscopy    Scheduled date of 12/8/22 (as of today) 9/2/22  Physician performing Dr Joo Infante:  Location of procedure  [place]:   Bowel prep reviewed with patient: christine/dulcolax  Instructions reviewed with patient by: MA  Clearances:

## 2022-09-02 NOTE — PROGRESS NOTES
Assessment/Plan:     Diagnoses and all orders for this visit:    Heme positive stool  Pt was found to have heme positive stool  He denies blood in his stool but does occasionally see blood at the stoma  He was previously recommended to undergo a colonoscopy but it was never performed  I think it is reasonable to undergo the procedure & pt is agreeable  Despite no discharge from rectum may also consider evaluating this area as well  We'll see him back after to discuss all results  -     Ambulatory Referral to Gastroenterology  -     Colonoscopy; Future  -     polyethylene glycol (GOLYTELY) 4000 mL solution; Take 4,000 mL by mouth once for 1 dose          Subjective:      Patient ID: Bree Mcknight is a 65 y o  male  HPI     Pt was referred for a heme positive stool  He is a paraplegic, wheel chair bound, s/p colostomy, diabetes, chronic pressure ulcers  He states the specimen was taken from the ostomy  He denies blood in the stool  He does sometimes notice blood at his stoma especially if the stool is loose  He states it varies in consistence  He denies any discharge from rectum  His most recent hbg was in March & was 12  He was seen 3 years ago & was recommended to have a colonoscopy but it was never scheduled  He has never had a colonoscopy  He states he has occasional heartburn symptoms & uses Prilosec as needed      Patient Active Problem List   Diagnosis    Stage IV pressure ulcer of sacral region ([address] )    Stage IV pressure ulcer of left heel (HCC)    Cyst of joint of shoulder    Anemia    Paraplegic spinal paralysis ([address] )    Sinus tachycardia    GERD (gastroesophageal reflux disease)    History of osteomyelitis    Anxiety    Amputated right leg (HCC)    Benign essential hypertension    Diabetes mellitus type II, controlled ([address] )    Diarrhea    Decubitus ulcer of ischium, left, stage IV (HCC)    Chronic, continuous use of opioids    Colostomy in place [facility])    Colon cancer screening    Dyspepsia    Epigastric pain    Osteomyelitis of pelvic region [facility])    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HCC)    Neurogenic bladder    Sepsis with hypotension (HCC)    History of Clostridium difficile colitis    Hyperkalemia    Stage II pressure ulcer of buttock (HCC)    Left perineal ischial pressure ulcer, stage IV (HCC)    SBO (small bowel obstruction) ([facility]    Sepsis (HCC)    Hypokalemia    Renal cyst    Other male erectile dysfunction    Prostate cancer screening    Type 2 diabetes mellitus without complication, without long-term current use of insulin (HCC)    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)    Stage IV pressure ulcer of right lower back (HCC)    Pressure injury of contiguous region involving back and left buttock, stage 4 (HCC)    Open wound of buttock, left, initial encounter    Heme positive stool     No Known Allergies  Current Outpatient Medications on File Prior to Visit   Medication Sig    Accu-Chek FastClix Lancets MISC Inject under the skin daily    aspirin (Aspirin Low Dose) 81 mg EC tablet Take 1 tablet (81 mg total) by mouth daily    Blood Glucose Monitoring Suppl (ONE TOUCH ULTRA 2) w/Device KIT by Does not apply route daily    Disposable Gloves (LATEX GLOVES LARGE) MISC Use as needed up to 3 times a day dispo 2 boxes    glucose blood (Accu-Chek Guide) test strip Use 1 each daily Use as instructed    ibuprofen (MOTRIN) 800 mg tablet Take 1 tablet (800 mg total) by mouth every 8 (eight) hours as needed for mild pain    Incontinence Supply Disposable (SUNBEAM INCONTINENT UNDER PAD) MISC Use 1 per week, dispense 4 reusable underpads    Incontinence Supply Disposable MISC by Does not apply route 2 (two) times a day    omeprazole (PriLOSEC) 40 MG capsule Take 1 capsule (40 mg total) by mouth daily    oxyCODONE (ROXICODONE) 20 MG TABS Take 1 tablet (20 mg total) by mouth 3 (three) times a day as needed for moderate pain For ongoing pain Max Daily Amount: 60 mg    acetaminophen (TYLENOL) 325 mg tablet Take 2 tablets (650 mg total) by mouth every 6 (six) hours as needed for mild pain, headaches or fever (Patient not taking: Reported on 9/2/2022)    ergocalciferol (VITAMIN D2) 50,000 units Take 1 capsule (50,000 Units total) by mouth once a week (Patient not taking: Reported on 9/2/2022)    naloxone (NARCAN) 4 mg/0 1 mL nasal spray Administer 1 spray into a nostril  If no response after 2-3 minutes, give another dose in the other nostril using a new spray  (Patient not taking: Reported on 9/2/2022)    Nutritional Supplements (Glucerna Shake) LIQD Take 3 Cans by mouth 3 (three) times a day (Patient not taking: Reported on 9/2/2022)    sodium hypochlorite (DAKIN'S HALF-STRENGTH) external solution Soak gauze and pack into wounds with each dressing change as directed  (Patient not taking: Reported on 9/2/2022)     No current facility-administered medications on file prior to visit       Family History   Problem Relation Age of Onset    No Known Problems Mother     No Known Problems Father      Past Medical History:   Diagnosis Date    Anemia     Blind     r eye    Chronic cystitis     Col[facility] in place [facility])     Detrusor sphincter dyssynergia     Diabetes mellitus ([address] )     Poorly controlled type 2; Last Assessed:  3/18/14    Erectile dysfunction     Frequency of urination     GERD (gastroesophageal reflux disease)     History of diabetes mellitus     History of osteomyelitis     Hx [facility] leg [facility])     r high upper leg    Hyperlipidemia     Hypertension     Hypospadias     Incomplete bladder emptying     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([address] ) 9/16/2019    Neurogenic bladder     Paralysis ([address] )     Paraplegia (HCC)     Spinal cord cysts     Ulcer of sacral region ([address] )     Urge incontinence      Social History     Socioeconomic History    Marital status: /Civil Union     Spouse name: None    Number of children: None    Years of education: None  Highest education level: None   Occupational History    None   Tobacco Use    Smoking status: Former Smoker     Packs/day: 0 50     Years: 10 00     Pack years: 5 00     Quit date:      Years since quittin 6    Smokeless tobacco: Never Used    Tobacco comment: Onset date:  11/10/17   Vaping Use    Vaping Use: Never used   Substance and Sexual Activity    Alcohol use: Yes     Comment: Per All[employer]:  Social drinker (Onset date:  11/10/17)    Drug use: No    Sexual activity: None   Other Topics Concern    None   Social History Narrative    Native language Tongan    [employer]    Social history reviewed, unchanged     Social Determinants of Health     Financial Resource Strain: Low Risk     Difficulty of Paying Living Expenses: Not hard at all   Food Insecurity: No Food Insecurity    Worried About [address] in the Last Year: Never true    [address] in the Last Year: Never true   Transportation Needs: No Transportation Needs    Lack of Transportation (Medical): No    Lack of Transportation (Non-Medical):  No   Physical Activity: Not on file   Stress: Not on file   Social Connections: Not on file   Intimate Partner Violence: Not on file   Housing Stability: Not on file     Past Surgical History:   Procedure Laterality Date    AMPUTATION      At hip; Last Assessed:  16    BLADDER SURGERY      COLON SURGERY      llq ostomy pouch    COLOSTOMY      COMPLEX CYSTOMETROGRAM      CT CYSTOGRAM  2019    CYSTOSCOPY      IR PICC REPOSITION  2019    IR SUPRAPUBIC CATHETER PLACEMENT  2019    LEG AMPUTATION      MEATOTOMY      PENILE PROSTHESIS  REMOVAL N/A 2019    Procedure: REMOVAL PROSTHESIS PENILE;  Surgeon: Sheryle Chambers, MD;  Location: [facility];  Service: Urology    PENILE PROSTHESIS IMPLANT      KS ADJ TISS XFER SCALP,EXTREM 10 1-30 SQCM Left 2017    Procedure: POSTERIOR THIGH V-Y ADMANCEMENT;  Surgeon: Mir Marshall MD; [facility];  Service: Plastics    IL MUSCLE-SKIN FLAP,TRUNK Left 5/1/2017    Procedure: FLAP CLOSURE LEFT ISCHIAL WOUND and "RIGHT" ISCHIAL FLAP ADVANCEMENT * DEBRIDEMENT, VAC PLACEMENT ;  Surgeon: Danni Campbell MD;  [facility];  Service: Plastics    IL MUSCLE-SKIN FLAP,TRUNK Left 9/27/2017    Procedure: gluteal myocutaneous rotational flap, posterior thigh v to y advancement- wound 5 x 2 5 x 8;  Surgeon: Danni Campbell MD;  [facility];  Service: Plastics    SPINE SURGERY      Lower back    UROFLOWMETRY SIMPLE / COMPLEX  2014         Review of Systems   All other systems reviewed and are negative  Objective:      /76   Pulse 105   Temp 99 2 °F (37 3 °C) (Tympanic)          Physical Exam  Constitutional:       Appearance: Normal appearance  He is well-developed  HENT:      Head: Normocephalic and atraumatic  Eyes:      Conjunctiva/sclera: Conjunctivae normal    Cardiovascular:      Rate and Rhythm: Normal rate and regular rhythm  Pulmonary:      Effort: Pulmonary effort is normal       Breath sounds: Normal breath sounds  Abdominal:      General: Bowel sounds are normal  There is no distension  Palpations: Abdomen is soft  Tenderness: There is no abdominal tenderness  Comments: Colostomy with soft, brown loose, unable to see stoma   Musculoskeletal:      Cervical back: Normal range of motion  Comments: Wheel chair bound   Skin:     General: Skin is warm and dry  Neurological:      Mental Status: He is alert and oriented to person, place, and time     Psychiatric:         Mood and Affect: Mood normal          Behavior: Behavior normal

## 2022-09-05 ENCOUNTER — HOME CARE VISIT (OUTPATIENT)
Dept: HOME HEALTH SERVICES | Facility: HOME HEALTHCARE | Age: 62
End: 2022-09-05
Payer: MEDICARE

## 2022-09-05 PROCEDURE — G0299 HHS/HOSPICE OF RN EA 15 MIN: HCPCS

## 2022-09-06 ENCOUNTER — HOME CARE VISIT (OUTPATIENT)
Dept: HOME HEALTH SERVICES | Facility: HOME HEALTHCARE | Age: 62
End: 2022-09-06
Payer: MEDICARE

## 2022-09-06 PROCEDURE — G0155 HHCP-SVS OF CSW,EA 15 MIN: HCPCS

## 2022-09-07 ENCOUNTER — HOME CARE VISIT (OUTPATIENT)
Dept: HOME HEALTH SERVICES | Facility: HOME HEALTHCARE | Age: 62
End: 2022-09-07
Payer: MEDICARE

## 2022-09-07 VITALS
SYSTOLIC BLOOD PRESSURE: 110 MMHG | TEMPERATURE: 98.1 F | RESPIRATION RATE: 18 BRPM | OXYGEN SATURATION: 97 % | DIASTOLIC BLOOD PRESSURE: 70 MMHG | HEART RATE: 78 BPM

## 2022-09-07 VITALS
OXYGEN SATURATION: 96 % | HEART RATE: 88 BPM | RESPIRATION RATE: 22 BRPM | SYSTOLIC BLOOD PRESSURE: 132 MMHG | TEMPERATURE: 98.6 F | DIASTOLIC BLOOD PRESSURE: 72 MMHG

## 2022-09-07 PROCEDURE — G0300 HHS/HOSPICE OF LPN EA 15 MIN: HCPCS

## 2022-09-08 PROCEDURE — 10330064 SALINE, IRR SOL STR 100ML (48/CS) MGM37

## 2022-09-09 ENCOUNTER — HOME CARE VISIT (OUTPATIENT)
Dept: HOME HEALTH SERVICES | Facility: HOME HEALTHCARE | Age: 62
End: 2022-09-09
Payer: MEDICARE

## 2022-09-09 VITALS
SYSTOLIC BLOOD PRESSURE: 110 MMHG | HEART RATE: 92 BPM | TEMPERATURE: 97.2 F | OXYGEN SATURATION: 98 % | DIASTOLIC BLOOD PRESSURE: 78 MMHG | RESPIRATION RATE: 12 BRPM

## 2022-09-09 PROCEDURE — 10330064 TAPE, RETENTION 2"X10YDS (1/BX24BX/CS)

## 2022-09-09 PROCEDURE — 10330064 SALINE, IRR SOL STR 100ML (48/CS) MGM37

## 2022-09-09 PROCEDURE — 10330064 FOAM, ADH SIL W/BORDER 4"X4" (10/BX 20BX

## 2022-09-09 PROCEDURE — G0300 HHS/HOSPICE OF LPN EA 15 MIN: HCPCS

## 2022-09-09 PROCEDURE — 10330064 DRESSING, MESALT DEBRIDER STR 3/4"X39" (

## 2022-09-09 PROCEDURE — 10330064 SPONGE, GAUZE 8PLY N/S 4"X4" (200/PK 20P

## 2022-09-09 PROCEDURE — 10330064 DRESSING, WND ALLEVYN LIFE SACRUM STR SM

## 2022-09-09 PROCEDURE — 10330064 PAD, ABD 5X9" STR LF (1/PK 20PK/BX) MGM1

## 2022-09-09 PROCEDURE — 10330064 SYRINGE, CATH TIP FLAT STR 60CC (50/CS)

## 2022-09-09 PROCEDURE — 10330064

## 2022-09-09 NOTE — CASE COMMUNICATION
Ship to  Pt  Home   Insurance  ab   Wound 1 x Full x   Wound 2 x Full xx   Red rubber straight cath 1   Syringe 60cc [id number] 2   Saline 100ml 175995 3  Gauze 4x4 N/S 200 4x4s per unit 471509 2   ABD 5x9 793065 30   Tape   Mefix 2inch [id number] 2   Mesalt ribbon ¾ x 39 [id number] 4   Silicone Foam with border 4x4 NOT STOCKED 177953 10   Allevyn life Foam sacral 6

## 2022-09-10 ENCOUNTER — HOME CARE VISIT (OUTPATIENT)
Dept: HOME HEALTH SERVICES | Facility: HOME HEALTHCARE | Age: 62
End: 2022-09-10
Payer: MEDICARE

## 2022-09-11 PROCEDURE — 10330064 CLEANSER, WND SEA-CLEANS 6OZ  COLPLT

## 2022-09-12 ENCOUNTER — HOME CARE VISIT (OUTPATIENT)
Dept: HOME HEALTH SERVICES | Facility: HOME HEALTHCARE | Age: 62
End: 2022-09-12
Payer: MEDICARE

## 2022-09-12 PROCEDURE — G0299 HHS/HOSPICE OF RN EA 15 MIN: HCPCS

## 2022-09-13 VITALS
RESPIRATION RATE: 18 BRPM | TEMPERATURE: 97.6 F | OXYGEN SATURATION: 98 % | DIASTOLIC BLOOD PRESSURE: 70 MMHG | SYSTOLIC BLOOD PRESSURE: 110 MMHG | HEART RATE: 78 BPM

## 2022-09-14 ENCOUNTER — HOME CARE VISIT (OUTPATIENT)
Dept: HOME HEALTH SERVICES | Facility: HOME HEALTHCARE | Age: 62
End: 2022-09-14
Payer: MEDICARE

## 2022-09-14 PROCEDURE — G0299 HHS/HOSPICE OF RN EA 15 MIN: HCPCS

## 2022-09-15 VITALS
TEMPERATURE: 97.8 F | OXYGEN SATURATION: 98 % | DIASTOLIC BLOOD PRESSURE: 68 MMHG | HEART RATE: 77 BPM | SYSTOLIC BLOOD PRESSURE: 122 MMHG | RESPIRATION RATE: 18 BRPM

## 2022-09-16 ENCOUNTER — HOME CARE VISIT (OUTPATIENT)
Dept: HOME HEALTH SERVICES | Facility: HOME HEALTHCARE | Age: 62
End: 2022-09-16
Payer: MEDICARE

## 2022-09-16 NOTE — CASE COMMUNICATION
informed pt no sn available for visit today next visit scheduled is monday pt agreeable   This is to notify md visit pattern out of deviation

## 2022-09-19 ENCOUNTER — HOME CARE VISIT (OUTPATIENT)
Dept: HOME HEALTH SERVICES | Facility: HOME HEALTHCARE | Age: 62
End: 2022-09-19
Payer: MEDICARE

## 2022-09-19 PROCEDURE — G0299 HHS/HOSPICE OF RN EA 15 MIN: HCPCS

## 2022-09-20 VITALS
HEART RATE: 82 BPM | DIASTOLIC BLOOD PRESSURE: 70 MMHG | SYSTOLIC BLOOD PRESSURE: 110 MMHG | OXYGEN SATURATION: 97 % | RESPIRATION RATE: 18 BRPM | TEMPERATURE: 97.6 F

## 2022-09-21 ENCOUNTER — HOME CARE VISIT (OUTPATIENT)
Dept: HOME HEALTH SERVICES | Facility: HOME HEALTHCARE | Age: 62
End: 2022-09-21
Payer: MEDICARE

## 2022-09-21 PROCEDURE — G0299 HHS/HOSPICE OF RN EA 15 MIN: HCPCS

## 2022-09-23 ENCOUNTER — HOME CARE VISIT (OUTPATIENT)
Dept: HOME HEALTH SERVICES | Facility: HOME HEALTHCARE | Age: 62
End: 2022-09-23
Payer: MEDICARE

## 2022-09-23 NOTE — CASE COMMUNICATION
TC to the patient regarding snv  Informed patient the plan was for the sn to visit tomorrow for the wound care  Patient reports his wife will be available tomorrow for the wound care and she can do it then so he doesnt need a nursing visit until next week  Change in snv pattern due to same

## 2022-09-24 ENCOUNTER — TELEPHONE (OUTPATIENT)
Dept: FAMILY MEDICINE CLINIC | Facility: CLINIC | Age: 62
End: 2022-09-24

## 2022-09-24 NOTE — TELEPHONE ENCOUNTER
PCP SIGNATURE NEEDED FOR [employer] FORM RECEIVED VIA FAX AND PLACED IN PCP FOLDER TO BE DELIVERED AT ASSIGNED TIMES

## 2022-09-26 ENCOUNTER — HOME CARE VISIT (OUTPATIENT)
Dept: HOME HEALTH SERVICES | Facility: HOME HEALTHCARE | Age: 62
End: 2022-09-26
Payer: MEDICARE

## 2022-09-26 VITALS
SYSTOLIC BLOOD PRESSURE: 110 MMHG | HEART RATE: 78 BPM | OXYGEN SATURATION: 97 % | RESPIRATION RATE: 20 BRPM | DIASTOLIC BLOOD PRESSURE: 60 MMHG | TEMPERATURE: 97.8 F

## 2022-09-26 DIAGNOSIS — M54.50 CHRONIC LOW BACK PAIN WITHOUT SCIATICA, UNSPECIFIED BACK PAIN LATERALITY: ICD-10-CM

## 2022-09-26 DIAGNOSIS — G89.29 CHRONIC LOW BACK PAIN WITHOUT SCIATICA, UNSPECIFIED BACK PAIN LATERALITY: ICD-10-CM

## 2022-09-26 PROCEDURE — G0299 HHS/HOSPICE OF RN EA 15 MIN: HCPCS

## 2022-09-26 PROCEDURE — 400014 VN F/U

## 2022-09-26 RX ORDER — ACETAMINOPHEN/DIPHENHYDRAMINE 500MG-25MG
TABLET ORAL
Qty: 90 TABLET | Refills: 0 | Status: SHIPPED | OUTPATIENT
Start: 2022-09-26

## 2022-09-26 NOTE — CASE COMMUNICATION
Home Health need continues for: wound care   Primary diagnoses/co-morbidities/recent procedures in past 60 days that impact current episode: dm paralysis r leg amputee blind r eye  Current level of functional ability: transfer indep bed to motorized wheelchair  Homebound status and living arrangements: lives with wife unable to drive  Goals accomplished and/or measurable progress toward unmet goals in past 60 days: skin tear healed left  hip wound was getting smaller   Focus of care for next 60 days for each discipline ordered: wound care to stage 3 and 4 wounds r back/torso left buttock perineum   Skin integrity/wound status: non healing  Most recent fall risk: yes reports no falls  Plan of Care confirmed with dr Lakesha Aragon [facility] wound care center [place]  on 9 23 22  Patient/caregiver will verbalize understanding  of continued care and goals for the next recertifica tion period

## 2022-09-27 VITALS
RESPIRATION RATE: 18 BRPM | HEART RATE: 80 BPM | DIASTOLIC BLOOD PRESSURE: 70 MMHG | TEMPERATURE: 97.3 F | OXYGEN SATURATION: 97 % | SYSTOLIC BLOOD PRESSURE: 118 MMHG

## 2022-09-27 PROCEDURE — 10330064 SPONGE, GAUZE 8PLY N/S 4"X4" (200/PK 20P

## 2022-09-27 PROCEDURE — 10330064 CLEANSER, WND SEA-CLEANS 6OZ  COLPLT

## 2022-09-27 PROCEDURE — 10330064 TAPE, RETENTION 2"X10YDS (1/BX24BX/CS)

## 2022-09-27 PROCEDURE — 10330064

## 2022-09-27 PROCEDURE — 10330064 FOAM, ADH SIL W/BORDER SACRAL 7"X7" (10/

## 2022-09-27 PROCEDURE — 10330064 BANDAGE, GAUZE COTTON  6PLY STR 4"X4YDS

## 2022-09-27 PROCEDURE — 10330064 SALINE, IRR SOL STR 100ML (48/CS) MGM37

## 2022-09-27 PROCEDURE — 10330064 PAD, ABD 5X9" STR LF (1/PK 20PK/BX) MGM1

## 2022-09-27 NOTE — CASE COMMUNICATION
Ship to  [employer]: [employer] [facility]     Wound 1 left hip     Wound 2 left buttock                perineum        All items are ordered by the each unless otherwise noted  PLEASE DO NOT ORDER BY THE BOX  Request specific quantity needed  For Private Insurances order should be for a 2 week period      Saline 100ml 061568 qty 4    Cleansers  Sea Clens 981348 qty 1      Dry Dressings   (Nonsterile gauze not covered by priva te insurance)  (Sterile gauze may be requested for insurance rather than nonsterile gauze  Gauze 4x4 N/S 200 4x4s per unit  989804  qty 2   Gauze Kerlix (fluff roll) 4inch sterile 736880  qty 8  ABD 5x9 415533 qty 8    Tape   Mefix 2inch 6195058 qty 2  Mesalt 4x4 NOT STOCKED [id number] qty 6    Calcium Alginates  (In place of Aquacel or Maxsorb)    Alginate rope  687562  needs to have silver on it qty 8  Allevyn sacral size foam drsg qty 9

## 2022-09-28 ENCOUNTER — HOME CARE VISIT (OUTPATIENT)
Dept: HOME HEALTH SERVICES | Facility: HOME HEALTHCARE | Age: 62
End: 2022-09-28
Payer: MEDICARE

## 2022-09-28 DIAGNOSIS — K21.9 GASTROESOPHAGEAL REFLUX DISEASE: ICD-10-CM

## 2022-09-28 DIAGNOSIS — E11.9 TYPE 2 DIABETES MELLITUS WITHOUT COMPLICATION, WITHOUT LONG-TERM CURRENT USE OF INSULIN (HCC): ICD-10-CM

## 2022-09-28 DIAGNOSIS — G89.29 CHRONIC LOW BACK PAIN WITHOUT SCIATICA, UNSPECIFIED BACK PAIN LATERALITY: ICD-10-CM

## 2022-09-28 DIAGNOSIS — L89.324 STAGE IV PRESSURE ULCER OF LEFT BUTTOCK (HCC): ICD-10-CM

## 2022-09-28 DIAGNOSIS — M54.50 CHRONIC LOW BACK PAIN WITHOUT SCIATICA, UNSPECIFIED BACK PAIN LATERALITY: ICD-10-CM

## 2022-09-28 PROCEDURE — G0299 HHS/HOSPICE OF RN EA 15 MIN: HCPCS

## 2022-09-29 VITALS
RESPIRATION RATE: 18 BRPM | TEMPERATURE: 98 F | HEART RATE: 78 BPM | SYSTOLIC BLOOD PRESSURE: 110 MMHG | OXYGEN SATURATION: 97 % | DIASTOLIC BLOOD PRESSURE: 70 MMHG

## 2022-09-29 RX ORDER — OMEPRAZOLE 40 MG/1
40 CAPSULE, DELAYED RELEASE ORAL DAILY
Qty: 90 CAPSULE | Refills: 0 | Status: SHIPPED | OUTPATIENT
Start: 2022-09-29 | End: 2022-10-27 | Stop reason: SDUPTHER

## 2022-09-29 RX ORDER — ASPIRIN 81 MG/1
81 TABLET ORAL DAILY
Qty: 90 TABLET | Refills: 0 | OUTPATIENT
Start: 2022-09-29

## 2022-09-29 RX ORDER — BLOOD-GLUCOSE METER
EACH MISCELLANEOUS DAILY
Qty: 100 KIT | Refills: 0 | Status: SHIPPED | OUTPATIENT
Start: 2022-09-29

## 2022-09-29 RX ORDER — OXYCODONE HYDROCHLORIDE 20 MG/1
20 TABLET ORAL 3 TIMES DAILY PRN
Qty: 90 TABLET | Refills: 0 | Status: SHIPPED | OUTPATIENT
Start: 2022-09-29

## 2022-09-29 RX ORDER — IBUPROFEN 800 MG/1
800 TABLET ORAL EVERY 8 HOURS PRN
Qty: 60 TABLET | Refills: 0 | Status: SHIPPED | OUTPATIENT
Start: 2022-09-29

## 2022-09-30 ENCOUNTER — HOME CARE VISIT (OUTPATIENT)
Dept: HOME HEALTH SERVICES | Facility: HOME HEALTHCARE | Age: 62
End: 2022-09-30
Payer: MEDICARE

## 2022-09-30 PROCEDURE — G0299 HHS/HOSPICE OF RN EA 15 MIN: HCPCS

## 2022-10-03 ENCOUNTER — HOME CARE VISIT (OUTPATIENT)
Dept: HOME HEALTH SERVICES | Facility: HOME HEALTHCARE | Age: 62
End: 2022-10-03
Payer: MEDICARE

## 2022-10-03 VITALS
TEMPERATURE: 98 F | OXYGEN SATURATION: 97 % | DIASTOLIC BLOOD PRESSURE: 70 MMHG | SYSTOLIC BLOOD PRESSURE: 110 MMHG | RESPIRATION RATE: 20 BRPM | HEART RATE: 88 BPM

## 2022-10-03 PROCEDURE — G0299 HHS/HOSPICE OF RN EA 15 MIN: HCPCS

## 2022-10-05 ENCOUNTER — HOME CARE VISIT (OUTPATIENT)
Dept: HOME HEALTH SERVICES | Facility: HOME HEALTHCARE | Age: 62
End: 2022-10-05
Payer: MEDICARE

## 2022-10-05 VITALS
SYSTOLIC BLOOD PRESSURE: 110 MMHG | HEART RATE: 76 BPM | TEMPERATURE: 97.8 F | DIASTOLIC BLOOD PRESSURE: 70 MMHG | RESPIRATION RATE: 18 BRPM | OXYGEN SATURATION: 97 %

## 2022-10-05 PROCEDURE — G0299 HHS/HOSPICE OF RN EA 15 MIN: HCPCS

## 2022-10-06 PROCEDURE — 10330064 SYRINGE, CATH TIP FLAT STR 60CC (50/CS)

## 2022-10-06 PROCEDURE — 10330064 DRESSING, MESALT DEBRIDER STR 3/4"X39" (

## 2022-10-06 PROCEDURE — 10330064 CATHETER, FOLEY 2WAY 5CC 16FR (10/BX)

## 2022-10-06 PROCEDURE — 10330064 PAD, ABD 5X9" STR LF (1/PK 20PK/BX) MGM1

## 2022-10-06 PROCEDURE — 10330064 SALINE, IRR SOL STR 100ML (48/CS) MGM37

## 2022-10-06 PROCEDURE — 10330064 DRESSING, CALCIUM ALGINATE ROPE 3/4"X12"

## 2022-10-06 PROCEDURE — 10330064 FOAM, ADH SIL W/BORDER SACRAL 7"X7" (10/

## 2022-10-06 PROCEDURE — 10330064 WIPE, SKIN GEL PROT DRSNG (50/BX)

## 2022-10-06 PROCEDURE — 10330064 TAPE, RETENTION 2"X10YDS (1/BX24BX/CS)

## 2022-10-06 PROCEDURE — 10330064 BAG, URINE LEG STR 1000ML (48/CS)

## 2022-10-06 PROCEDURE — 10330064 BANDAGE, GAUZE COTTON  6PLY STR 4"X4YDS

## 2022-10-06 PROCEDURE — 10330064 SPONGE, GAUZE 8PLY N/S 4"X4" (200/PK 20P

## 2022-10-06 PROCEDURE — 10330064 CATH TRAY, FOLEY SYR 10CC (20/CS)

## 2022-10-06 NOTE — CASE COMMUNICATION
Ship to  [facility] [employer]: [employer]    Wound 1 left buttock      Wound 2 left hip               3  perineum              4   r back /torso       All items are ordered by the each unless otherwise noted  PLEASE DO NOT ORDER BY THE BOX  Request specific quantity needed  For Private Insurances order should be for a 2 week period    [employer]  16fr 5cc cath 634532 qty 1  Syringe 60cc 705236 qty 1    10cc cath tray 147 915 qty 1    Leg bag large 062597   bard   Saline 100ml 216498 2      Dry Dressings   (Nonsterile gauze not covered by private insurance)  (Sterile gauze may be requested for insurance rather than nonsterile gauze)  Gauze 4x4 N/S 200 4x4s per unit  544514 qty 2  Gauze Kerlix (fluff roll) 4inch sterile 127184 qty 5  ABD 5x9 210404 qty 21   Tape   Mefix 2inch 8779423 qty 2    Packing   Mesalt ribbon ¾ x 39 3970291 qty  7      Calcium Algin ates  (In place of Aquacel or Maxsorb)  Alginate rope  137072  qty 5 with silver  sacral allveyn boardered adhesive dtsg 7        Adapt skin barrier no sting wipes 50 per box  243350 qty 1 box

## 2022-10-07 ENCOUNTER — TELEPHONE (OUTPATIENT)
Dept: FAMILY MEDICINE CLINIC | Facility: CLINIC | Age: 62
End: 2022-10-07

## 2022-10-07 ENCOUNTER — HOME CARE VISIT (OUTPATIENT)
Dept: HOME HEALTH SERVICES | Facility: HOME HEALTHCARE | Age: 62
End: 2022-10-07
Payer: MEDICARE

## 2022-10-07 PROCEDURE — G0299 HHS/HOSPICE OF RN EA 15 MIN: HCPCS

## 2022-10-07 NOTE — TELEPHONE ENCOUNTER
10/07/22    PCP SIGNATURE NEEDED FOR MEDICARE PART BDETAILED WRITTEN ORDER FORM RECEIVED VIA FAX AND PLACED IN PCP FOLDER TO BE DELIVERED AT ASSIGNED TIMES        Med: ONETOUCH ULTRA GLUCOSE SYST

## 2022-10-10 ENCOUNTER — HOME CARE VISIT (OUTPATIENT)
Dept: HOME HEALTH SERVICES | Facility: HOME HEALTHCARE | Age: 62
End: 2022-10-10

## 2022-10-10 ENCOUNTER — HOME CARE VISIT (OUTPATIENT)
Dept: HOME HEALTH SERVICES | Facility: HOME HEALTHCARE | Age: 62
End: 2022-10-10
Payer: MEDICARE

## 2022-10-10 VITALS
HEART RATE: 84 BPM | RESPIRATION RATE: 20 BRPM | TEMPERATURE: 97.3 F | SYSTOLIC BLOOD PRESSURE: 110 MMHG | DIASTOLIC BLOOD PRESSURE: 70 MMHG | OXYGEN SATURATION: 98 %

## 2022-10-10 VITALS
TEMPERATURE: 98 F | RESPIRATION RATE: 16 BRPM | SYSTOLIC BLOOD PRESSURE: 124 MMHG | OXYGEN SATURATION: 97 % | HEART RATE: 68 BPM | DIASTOLIC BLOOD PRESSURE: 76 MMHG

## 2022-10-10 PROCEDURE — 10330064

## 2022-10-10 PROCEDURE — G0300 HHS/HOSPICE OF LPN EA 15 MIN: HCPCS

## 2022-10-10 NOTE — CASE COMMUNICATION
T c  to [facility] on [address] and per [facility] in pharmacy there are no current scripts or refills for Dakins  T c  to [facility] and left message notifying that the patient needs a script for Dakins sent to the pharmacy

## 2022-10-10 NOTE — CASE COMMUNICATION
To patient home  [facility]    Ostomy supplies ordered by erasto Webster Wafer Ref [id number]  [employer] [id number]

## 2022-10-11 ENCOUNTER — HOME CARE VISIT (OUTPATIENT)
Dept: HOME HEALTH SERVICES | Facility: HOME HEALTHCARE | Age: 62
End: 2022-10-11
Payer: MEDICARE

## 2022-10-11 DIAGNOSIS — L89.154 STAGE IV PRESSURE ULCER OF SACRAL REGION (HCC): ICD-10-CM

## 2022-10-11 RX ORDER — SODIUM HYPOCHLORITE 2.5 MG/ML
SOLUTION TOPICAL
Qty: 473 ML | Refills: 1 | Status: SHIPPED | OUTPATIENT
Start: 2022-10-11

## 2022-10-11 NOTE — CASE COMMUNICATION
Received return phonecall from Radha at [facility] stating they sent a script for Dakins to the pharmacy  Sn called patient and notified of same  He states they will pick it up later today

## 2022-10-12 ENCOUNTER — HOME CARE VISIT (OUTPATIENT)
Dept: HOME HEALTH SERVICES | Facility: HOME HEALTHCARE | Age: 62
End: 2022-10-12
Payer: MEDICARE

## 2022-10-12 ENCOUNTER — PATIENT OUTREACH (OUTPATIENT)
Dept: FAMILY MEDICINE CLINIC | Facility: CLINIC | Age: 62
End: 2022-10-12

## 2022-10-12 PROCEDURE — G0299 HHS/HOSPICE OF RN EA 15 MIN: HCPCS

## 2022-10-12 NOTE — PROGRESS NOTES
REINIER BARRIOS did receive IB message stating that Pt did call requesting a letter to send it to [employer]  After chart review REINIER BARRIOS did place a call to Pt to follow up  After chart review REINIER BARRIOS did place a call to Pt to follow up  REINIER BARRIOS introduced herself, her role and explained the purpose of this call  Also, REINIER BARRIOS assisted the Pt in [facility] [place]  Pt stated that he should pay $300 00 to [employer] but he does not have money for it  REINIER BARRIOS asked Pt if he did apply for [employer] or Denise Larve  Pt stated that he had [address] program in the past but they stopped the service, also, he applied for [employer], however, this service will start on November  REINIER BARRIOS informed Pt that she will complete the [employer] form, however, he should be aware of the provider should sign the form  REINIER BARRIOS will ask the provider to sign the form, if she is willing to sign it, REINIER BARRIOS will be able to fax the form to [employer]  REINIER BARRIOS asked Pt to please provide the PPL  Account number, to share if the account is under his name and the address is correct  Pt provided all the information and  REINIER BARRIOS confirmed everything is correct  Account number ([id number])  In addition, REINIER BARRIOS informed Pt that she will call him once the form is completed  Pt seems understanding and thankful for the REINIER  support  REINIER BARRIOS spoke with Renetta Saunders about Pt's PPL form, provided her the form, since she will ask the provider to sign it  Once the form is signed, Willie Cano will bring it to REINIER   REINIER BARRIOS is remain available for further assistance as needed

## 2022-10-12 NOTE — TELEPHONE ENCOUNTER
Pt qualifies, he has multiple items in the home that require electricity to operate (meg lift, electric wheelchair, etc)    Pt is paraplegic

## 2022-10-13 ENCOUNTER — PATIENT OUTREACH (OUTPATIENT)
Dept: FAMILY MEDICINE CLINIC | Facility: CLINIC | Age: 62
End: 2022-10-13

## 2022-10-13 NOTE — PROGRESS NOTES
REINIER BARRIOS did receive a VM from Pt and returned the call  Pt stated that he wants to confirm if the PPL form has been signed per provider, also, he called to [employer] and he was told that the shut off will be until 10/18/22  REINIER BARRIOS informed Pt that once the form is signed it will be fax to PP  Also, REINIER ABRRIOS will call him to inform that everything is completed  Pt seems understanding  Outgoing call  REINIER BARRIOS did place a call to Pt to inform that the PPL form has been signed per provider and faxed to PP successfully  Pt seems understanding and expressed gratitude to REINIER BARRIOS for her assistance  REINIER BARRIOS is remain available for further assistance as needed

## 2022-10-14 ENCOUNTER — HOME CARE VISIT (OUTPATIENT)
Dept: HOME HEALTH SERVICES | Facility: HOME HEALTHCARE | Age: 62
End: 2022-10-14
Payer: MEDICARE

## 2022-10-14 VITALS
RESPIRATION RATE: 18 BRPM | SYSTOLIC BLOOD PRESSURE: 110 MMHG | OXYGEN SATURATION: 96 % | DIASTOLIC BLOOD PRESSURE: 70 MMHG | HEART RATE: 78 BPM

## 2022-10-14 PROCEDURE — G0299 HHS/HOSPICE OF RN EA 15 MIN: HCPCS

## 2022-10-14 NOTE — TELEPHONE ENCOUNTER
I completed the form however patient last seen over 6 months ago and as per Medicare requirements and as it is clearly stated on the form patient must have been seen in the last 6 months

## 2022-10-17 ENCOUNTER — HOME CARE VISIT (OUTPATIENT)
Dept: HOME HEALTH SERVICES | Facility: HOME HEALTHCARE | Age: 62
End: 2022-10-17
Payer: MEDICARE

## 2022-10-17 VITALS
RESPIRATION RATE: 18 BRPM | DIASTOLIC BLOOD PRESSURE: 60 MMHG | OXYGEN SATURATION: 96 % | SYSTOLIC BLOOD PRESSURE: 118 MMHG | HEART RATE: 78 BPM | TEMPERATURE: 98 F

## 2022-10-17 VITALS
DIASTOLIC BLOOD PRESSURE: 70 MMHG | TEMPERATURE: 98 F | HEART RATE: 80 BPM | RESPIRATION RATE: 22 BRPM | SYSTOLIC BLOOD PRESSURE: 126 MMHG | OXYGEN SATURATION: 99 %

## 2022-10-17 PROCEDURE — G0300 HHS/HOSPICE OF LPN EA 15 MIN: HCPCS

## 2022-10-19 ENCOUNTER — HOME CARE VISIT (OUTPATIENT)
Dept: HOME HEALTH SERVICES | Facility: HOME HEALTHCARE | Age: 62
End: 2022-10-19
Payer: MEDICARE

## 2022-10-19 ENCOUNTER — HOME CARE VISIT (OUTPATIENT)
Dept: HOME HEALTH SERVICES | Facility: HOME HEALTHCARE | Age: 62
End: 2022-10-19

## 2022-10-19 ENCOUNTER — TELEPHONE (OUTPATIENT)
Dept: WOUND CARE | Facility: CLINIC | Age: 62
End: 2022-10-19

## 2022-10-19 VITALS
SYSTOLIC BLOOD PRESSURE: 112 MMHG | RESPIRATION RATE: 12 BRPM | HEART RATE: 72 BPM | TEMPERATURE: 95.9 F | DIASTOLIC BLOOD PRESSURE: 82 MMHG | OXYGEN SATURATION: 98 %

## 2022-10-19 PROCEDURE — 10330064 PAD, ABD 5X9" STR LF (1/PK 20PK/BX) MGM1

## 2022-10-19 PROCEDURE — 10330064 TAPE, RETENTION 2"X10YDS (1/BX24BX/CS)

## 2022-10-19 PROCEDURE — 10330064 FOAM, ADH SIL W/BORDER 4"X4" (10/BX 20BX

## 2022-10-19 PROCEDURE — 10330064 FOAM, ADH SIL W/BORDER SACRAL 7"X7" (10/

## 2022-10-19 PROCEDURE — 10330064 SPONGE, GAUZE 8PLY N/S 4"X4" (200/PK 20P

## 2022-10-19 PROCEDURE — G0300 HHS/HOSPICE OF LPN EA 15 MIN: HCPCS

## 2022-10-19 NOTE — CASE COMMUNICATION
Ship to  Pt  Home   Insurance  ab     Wound 1 x      Full x         Wound 2 x      Full x        Gauze 4x4 N/S 200 4x4s per unit  030237 2   ABD 5x9 920018 14  Tape   Mefix 2inch [id number] 2  Sub for Mepilex:  Silicone Foam with border 4x4 NOT STOCKED 882084 12  Silicone Foam sacral NOT STOCKED 638819 0

## 2022-10-19 NOTE — TELEPHONE ENCOUNTER
Received phone call from home care nurse, [facility]  Informed that Dakin's is out of stock at patient's pharmacy and need substitute order until Dakin's obtained  Per Dr Maame Harmon [facility] to substitute saline wet-to-dry until Dakin's obtained  Advised home care RN that patient has not been evaluated at outpatient wound care since 8/30/2022 and should schedule a follow up as soon as possible  Home care RN reported that  is involved in case because patient is having issues with transportation and they have also advised patient of need for evaluation at wound center

## 2022-10-21 ENCOUNTER — HOME CARE VISIT (OUTPATIENT)
Dept: HOME HEALTH SERVICES | Facility: HOME HEALTHCARE | Age: 62
End: 2022-10-21
Payer: MEDICARE

## 2022-10-21 PROCEDURE — G0299 HHS/HOSPICE OF RN EA 15 MIN: HCPCS

## 2022-10-24 ENCOUNTER — HOME CARE VISIT (OUTPATIENT)
Dept: HOME HEALTH SERVICES | Facility: HOME HEALTHCARE | Age: 62
End: 2022-10-24
Payer: MEDICARE

## 2022-10-24 VITALS
RESPIRATION RATE: 18 BRPM | DIASTOLIC BLOOD PRESSURE: 70 MMHG | HEART RATE: 76 BPM | OXYGEN SATURATION: 97 % | TEMPERATURE: 97.6 F | SYSTOLIC BLOOD PRESSURE: 120 MMHG

## 2022-10-24 PROCEDURE — G0299 HHS/HOSPICE OF RN EA 15 MIN: HCPCS

## 2022-10-26 ENCOUNTER — HOME CARE VISIT (OUTPATIENT)
Dept: HOME HEALTH SERVICES | Facility: HOME HEALTHCARE | Age: 62
End: 2022-10-26

## 2022-10-26 VITALS
TEMPERATURE: 97.9 F | OXYGEN SATURATION: 96 % | SYSTOLIC BLOOD PRESSURE: 110 MMHG | DIASTOLIC BLOOD PRESSURE: 60 MMHG | HEART RATE: 78 BPM | RESPIRATION RATE: 18 BRPM

## 2022-10-26 PROCEDURE — 10330064 CATH TRAY, FOLEY SYR 10CC (20/CS)

## 2022-10-26 PROCEDURE — 10330064 SYRINGE, LL 10CC (100/BX 12BX/CS)

## 2022-10-26 PROCEDURE — 400014 VN F/U

## 2022-10-26 PROCEDURE — 10330064 CATHETER, FOLEY 2WAY 5CC 16FR (10/BX)

## 2022-10-26 PROCEDURE — 10330064 SALINE, IRR SOL STR 100ML (48/CS) MGM37

## 2022-10-26 PROCEDURE — 10330064 BAG, URINE LEG STR 1000ML (48/CS)

## 2022-10-26 NOTE — CASE COMMUNICATION
Ship to    Home   Branch:[place]  Insurance medicare    Wound 1 left hip buttock    perineum   Wound 2 back/torso  All items are ordered by the each unless otherwise noted    PLEASE DO NOT ORDER BY THE BOX  Request specific quantity needed  For Private Insurances order should be for a 2 week period    [employer]  16fr 5cc cath 391959 qty 1  Syringe 10cc 1886459 qty 1    10cc cath tray 077984 qty 1  bolanos Leg bag large 074559 qty  2    Saline 100ml 650717 qty 2

## 2022-10-27 DIAGNOSIS — M54.50 CHRONIC LOW BACK PAIN WITHOUT SCIATICA, UNSPECIFIED BACK PAIN LATERALITY: ICD-10-CM

## 2022-10-27 DIAGNOSIS — G89.29 CHRONIC LOW BACK PAIN WITHOUT SCIATICA, UNSPECIFIED BACK PAIN LATERALITY: ICD-10-CM

## 2022-10-27 DIAGNOSIS — K21.9 GASTROESOPHAGEAL REFLUX DISEASE: ICD-10-CM

## 2022-10-27 DIAGNOSIS — L89.324 STAGE IV PRESSURE ULCER OF LEFT BUTTOCK (HCC): ICD-10-CM

## 2022-10-28 RX ORDER — OMEPRAZOLE 40 MG/1
40 CAPSULE, DELAYED RELEASE ORAL DAILY
Qty: 90 CAPSULE | Refills: 0 | Status: SHIPPED | OUTPATIENT
Start: 2022-10-28

## 2022-10-30 VITALS
DIASTOLIC BLOOD PRESSURE: 70 MMHG | SYSTOLIC BLOOD PRESSURE: 118 MMHG | TEMPERATURE: 97.6 F | OXYGEN SATURATION: 97 % | RESPIRATION RATE: 18 BRPM | HEART RATE: 78 BPM

## 2022-10-31 ENCOUNTER — HOME CARE VISIT (OUTPATIENT)
Dept: HOME HEALTH SERVICES | Facility: HOME HEALTHCARE | Age: 62
End: 2022-10-31

## 2022-10-31 VITALS
SYSTOLIC BLOOD PRESSURE: 122 MMHG | HEART RATE: 76 BPM | RESPIRATION RATE: 20 BRPM | DIASTOLIC BLOOD PRESSURE: 82 MMHG | OXYGEN SATURATION: 98 % | TEMPERATURE: 96.2 F

## 2022-10-31 RX ORDER — IBUPROFEN 800 MG/1
800 TABLET ORAL EVERY 8 HOURS PRN
Qty: 60 TABLET | Refills: 0 | Status: SHIPPED | OUTPATIENT
Start: 2022-10-31

## 2022-10-31 RX ORDER — ASPIRIN 81 MG/1
81 TABLET ORAL DAILY
Qty: 90 TABLET | Refills: 0 | Status: SHIPPED | OUTPATIENT
Start: 2022-10-31

## 2022-10-31 RX ORDER — OXYCODONE HYDROCHLORIDE 20 MG/1
20 TABLET ORAL 3 TIMES DAILY PRN
Qty: 90 TABLET | Refills: 0 | Status: SHIPPED | OUTPATIENT
Start: 2022-10-31

## 2022-11-02 ENCOUNTER — HOME CARE VISIT (OUTPATIENT)
Dept: HOME HEALTH SERVICES | Facility: HOME HEALTHCARE | Age: 62
End: 2022-11-02

## 2022-11-02 VITALS
HEART RATE: 78 BPM | OXYGEN SATURATION: 97 % | SYSTOLIC BLOOD PRESSURE: 120 MMHG | RESPIRATION RATE: 18 BRPM | TEMPERATURE: 97.5 F | DIASTOLIC BLOOD PRESSURE: 70 MMHG

## 2022-11-04 ENCOUNTER — HOME CARE VISIT (OUTPATIENT)
Dept: HOME HEALTH SERVICES | Facility: HOME HEALTHCARE | Age: 62
End: 2022-11-04

## 2022-11-04 VITALS
SYSTOLIC BLOOD PRESSURE: 104 MMHG | DIASTOLIC BLOOD PRESSURE: 70 MMHG | OXYGEN SATURATION: 98 % | TEMPERATURE: 98.1 F | HEART RATE: 84 BPM | RESPIRATION RATE: 12 BRPM

## 2022-11-04 PROCEDURE — 10330064 PAD, ABD 5X9" STR LF (1/PK 20PK/BX) MGM1

## 2022-11-04 PROCEDURE — 10330064 FOAM, ADH SIL W/BORDER 4"X4" (10/BX 20BX

## 2022-11-04 PROCEDURE — 10330064 SPONGE, GAUZE 8PLY N/S 4"X4" (200/PK 20P

## 2022-11-04 PROCEDURE — 10330064 TAPE, RETENTION 2"X10YDS (1/BX24BX/CS)

## 2022-11-04 PROCEDURE — 10330064 SYRINGE, CATH TIP FLAT STR 60CC (50/CS)

## 2022-11-04 PROCEDURE — 10330064 BANDAGE, GAUZE COTTON  6PLY STR 4"X4YDS

## 2022-11-04 PROCEDURE — 10330064 FOAM, ADH SIL W/BORDER SACRAL 7"X7" (10/

## 2022-11-04 PROCEDURE — 10330064

## 2022-11-04 PROCEDURE — 10330064 DRESSING, MESALT DEBRIDER STR 3/4"X39" (

## 2022-11-04 NOTE — CASE COMMUNICATION
Patient is going to [facility] on 11/7/22  No SN visit needed this day 
Strong peripheral pulses

## 2022-11-04 NOTE — CASE COMMUNICATION
Good morning, Dr Pal Mcpherson! I saw this patient today and upon removing the outer dressing from the L buttock/perineum wound, I removed:  -one piece of kerlix type guaze from the L buttock wound opening   -one piece of guaze from inbetween the L buttock and perineum wound  -one piece of guaze from the depth of the perineum wound  The patient confirmed that his wife is still performing the wound care on days SN does not visit  I jorge wed the patient that Mesalt packing is to be used for those wounds and only one piece is to be applied  I left a package of Mesalt packing out for wife's reference  I also reinforced that kerlix packing is no longer being used for the L buttock and perineum wounds  I thoroughly flushed the wounds out, used my flashlight, and finger swept the wound beds to confirm there was no other guaze visible (as well as able to tell) in wound  beds  Packed with one piece mesalt, taped tail, and documented on outer dressing  The patient follows up with you on Monday      Thank you,  Carlos Truong

## 2022-11-04 NOTE — CASE COMMUNICATION
Ship to [facility]  Home   Insurance  ab   Wound 1 x Full x   Wound 2 x Full x   Gauze 4x4 N/S 200 4x4s per unit 573117 2   ABD 5x9 636451 14   Tape   Mefix 2inch [id number] 2   Sub for Mepilex:   Silicone Foam with border 4x4 NOT STOCKED 014717 12   Silicone Foam sacral NOT STOCKED 228743 9  Syringe 60cc 043068 2   Red rubber catheter  2  Gauze Kerlix (fluff roll) 4inch sterile 345932 5  Mesalt ribbon ¾ x 39 1971903 7

## 2022-11-07 ENCOUNTER — HOME CARE VISIT (OUTPATIENT)
Dept: HOME HEALTH SERVICES | Facility: HOME HEALTHCARE | Age: 62
End: 2022-11-07

## 2022-11-08 VITALS
OXYGEN SATURATION: 96 % | RESPIRATION RATE: 18 BRPM | TEMPERATURE: 97.6 F | SYSTOLIC BLOOD PRESSURE: 110 MMHG | HEART RATE: 78 BPM | DIASTOLIC BLOOD PRESSURE: 70 MMHG

## 2022-11-08 PROCEDURE — 10330064

## 2022-11-09 ENCOUNTER — HOME CARE VISIT (OUTPATIENT)
Dept: HOME HEALTH SERVICES | Facility: HOME HEALTHCARE | Age: 62
End: 2022-11-09

## 2022-11-10 VITALS
SYSTOLIC BLOOD PRESSURE: 118 MMHG | OXYGEN SATURATION: 97 % | DIASTOLIC BLOOD PRESSURE: 68 MMHG | HEART RATE: 78 BPM | TEMPERATURE: 98 F | RESPIRATION RATE: 18 BRPM

## 2022-11-11 ENCOUNTER — HOME CARE VISIT (OUTPATIENT)
Dept: HOME HEALTH SERVICES | Facility: HOME HEALTHCARE | Age: 62
End: 2022-11-11

## 2022-11-11 ENCOUNTER — OFFICE VISIT (OUTPATIENT)
Dept: WOUND CARE | Facility: CLINIC | Age: 62
End: 2022-11-11

## 2022-11-11 VITALS
DIASTOLIC BLOOD PRESSURE: 80 MMHG | HEART RATE: 72 BPM | SYSTOLIC BLOOD PRESSURE: 112 MMHG | TEMPERATURE: 97.3 F | RESPIRATION RATE: 18 BRPM

## 2022-11-11 DIAGNOSIS — L89.223 STAGE III PRESSURE ULCER OF LEFT HIP (HCC): ICD-10-CM

## 2022-11-11 DIAGNOSIS — L89.134 STAGE IV PRESSURE ULCER OF RIGHT LOWER BACK (HCC): ICD-10-CM

## 2022-11-11 DIAGNOSIS — L89.154 STAGE IV PRESSURE ULCER OF SACRAL REGION (HCC): Primary | ICD-10-CM

## 2022-11-11 DIAGNOSIS — L89.324 LEFT PERINEAL ISCHIAL PRESSURE ULCER, STAGE IV (HCC): ICD-10-CM

## 2022-11-11 NOTE — PROGRESS NOTES
Patient ID: Faisal Moore is a 65 y o  male Date of Birth 1960       Chief Complaint   Patient presents with   • Follow Up Wound Care Visit     Stage IV pressure ulcer of sacral region [facility])       Allergies:  Patient has no known allergies  Diagnosis:      Diagnosis ICD-10-CM Associated Orders   1  Stage IV pressure ulcer of sacral region [facility])  L89 154 Wound cleansing and dressings   2  Stage IV pressure ulcer of right lower back ([facility])  L89 134 Wound cleansing and dressings   3  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 ([facility])  G72 946 Wound cleansing and dressings   4  Left perineal ischial pressure ulcer, stage IV ([facility])  L89 324 Wound cleansing and dressings     Tissue Exam     Debridement           Assessment & Plan:  Multiple stage IV pressure injuries as noted above  The left hip is improving  The right lower back still probes to bone but is not exposed  The sacral pressure injuries and the perineal communicate  There is a bridge of skin over the sacral that was excised surgically  Specimen sent to pathology  Wound care will consist of stopping the Dakin's packing  The right lower back will be packed with saline gauze and covered with ABD  All the other pressure injuries will be packed with silver alginate rope  See wound care orders  Follow up with Dr Tiffany Llamas  Subjective:   Mr Devora Castro is a 28-year-old gentleman with paraplegia and history of multiple pressure wounds with 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  He had been rescheduled for a debridement and flap closure by plastics in August but developed a new wound on his right mid to lower back chest wall  This wound probes deep and has been closed on the outside but the tract has not been improving  He had a CT scan that shows a deep tracking to the muscle but no fistulization to the internal thoracic cavity  02/04/2022 he returns now for re-evaluation    He still has had visiting nurses but has not been seen in the Wound Center since November  He denies any change or complaint    03/11/2022 no changes since been seen last   No new complaints  04/22/2022 no issues  No changes  05/27/2022  Doing well  Denies fevers or chills  No issues spoke about plastics a re-evaluation but he wants to wait for COVID to wane more and maybe next fall    07/22/2022  He has not been seen here since May mostly due to transportation issues  He has significant more pain and drainage on his right back chest wall wound  Otherwise no changes    08/05/2022 denies fevers or chills  Still in pain on the right side  11/11/22:  Patient returns to the wound center in follow-up of his multiple pressure injuries  He has not been here in a number of months  He has no new complaints  No pain, fever or chills        The following portions of the patient's history were reviewed and updated as appropriate:   Patient Active Problem List   Diagnosis   • Stage IV pressure ulcer of sacral region [facility])   • Stage IV pressure ulcer of left heel ([facility])   • Cyst of joint of shoulder   • Anemia   • Paraplegic spinal paralysis ([facility])   • Sinus tachycardia   • GERD (gastroesophageal reflux disease)   • History of osteomyelitis   • Anxiety   • Amputated right leg ([facility])   • Benign essential hypertension   • Diabetes mellitus type II, controlled ([facility] )   • Diarrhea   • Decubitus ulcer of ischium, left, stage IV ([facility])   • Chronic, continuous use of opioids   • Colostomy in place [facility])   • Colon cancer screening   • Dyspepsia   • Epigastric pain   • Osteomyelitis of pelvic region [facility])   • 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[facility] )   • Neurogenic bladder   • Sepsis with hypotension ([address] )   • History of Clostridium difficile colitis   • Hyperkalemia   • Stage II pressure ulcer of buttock ([address] )   • Left perineal ischial pressure ulcer, stage IV ([facility])   • SBO (small bowel obstruction) ([address] )   • Sepsis ([address] )   • Hypokalemia   • Renal cyst   • Other male erectile dysfunction   • Prostate cancer screening   • Type 2 diabetes mellitus without complication, without long-term current use of insulin ([facility])   • 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 ([facility])   • Stage IV pressure ulcer of right lower back ([facility])   • Pressure injury of contiguous region involving back and left buttock, stage 4 ([facility])   • Open wound of buttock, left, initial encounter   • Heme positive stool     Past Medical History:   Diagnosis Date   • Anemia    • Blind     r eye   • Chronic cystitis    • Colostomy in place [facility])    • Detrusor sphincter dyssynergia    • Diabetes mellitus ([facility] )     Poorly controlled type 2; Last Assessed:  3/18/14   • Erectile dysfunction    • Frequency of urination    • GERD (gastroesophageal reflux disease)    • History of diabetes mellitus    • History of osteomyelitis    • Hx of leg amputation ([facility])     r high upper leg   • Hyperlipidemia    • Hypertension    • Hypospadias    • Incomplete bladder emptying    • 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([facility] ) 9/16/2019   • Neurogenic bladder    • Paralysis ([facility] )    • Paraplegia ([facility])    • Spinal cord cysts    • Ulcer of sacral region [facility])    • Urge incontinence      Past Surgical History:   Procedure Laterality Date   • AMPUTATION      At hip; Last Assessed:  1/19/16   • BLADDER SURGERY     • COLON SURGERY      llq ostomy pouch   • COLOSTOMY     • COMPLEX CYSTOMETROGRAM  2014   • CT CYSTOGRAM  9/19/2019   • CYSTOSCOPY  2014   • IR PICC REPOSITION  9/23/2019   • IR SUPRAPUBIC CATHETER PLACEMENT  9/19/2019   • LEG AMPUTATION     • MEATOTOMY     • PENILE PROSTHESIS  REMOVAL N/A 11/1/2019    Procedure: REMOVAL PROSTHESIS PENILE;  Surgeon: Elsy Dasilva MD;  Location: [facility];  Service: Urology   • PENILE PROSTHESIS IMPLANT  2011   • [place] ADJ TISS XFER SCALP,EXTREM 10 1-30 SQCM Left 5/1/2017    Procedure: POSTERIOR THIGH V-Y ADMANCEMENT;  Surgeon: Jalil Sheldon MD;  Location: [facility];  Service: Plastics   • [place] MUSCLE-SKIN FLAP,TRUNK Left 5/1/2017    Procedure: FLAP CLOSURE LEFT ISCHIAL WOUND and "RIGHT" ISCHIAL FLAP ADVANCEMENT * DEBRIDEMENT, VAC PLACEMENT ;  Surgeon: Francesca Ocampo MD;  Location[facility];  Service: Plastics   • AR MUSCLE-SKIN FLAP,TRUNK Left 2017    Procedure: gluteal myocutaneous rotational flap, posterior thigh v to y advancement- wound 5 x 2 5 x 8;  Surgeon: Francesca Ocampo MD;  Location[facility];  Service: Plastics   • SPINE SURGERY      Lower back   • UROFLOWMETRY SIMPLE / COMPLEX       Family History   Problem Relation Age of Onset   • No Known Problems Mother    • No Known Problems Father       Social History     Socioeconomic History   • Marital status: /Civil Union     Spouse name: Not on file   • Number of children: Not on file   • Years of education: Not on file   • Highest education level: Not on file   Occupational History   • Not on file   Tobacco Use   • Smoking status: Former Smoker     Packs/day: 0 50     Years: 10 00     Pack years: 5 00     Quit date: 26     Years since quittin 8   • Smokeless tobacco: Never Used   • Tobacco comment: Onset date:  11/10/17   Vaping Use   • Vaping Use: Never used   Substance and Sexual Activity   • Alcohol use: Yes     Comment: Per [employer]:  Social drinker (Onset date:  11/10/17)   • Drug use: No   • Sexual activity: Not on file   Other Topics Concern   • Not on file   Social History Narrative    [address]    Social history reviewed, unchanged     Social Determinants of Health     Financial Resource Strain: Low Risk    • Difficulty of Paying Living Expenses: Not hard at all   Food Insecurity: No Food Insecurity   • Worried About [address] [employer] in the Last Year: Never true   • Ran Out of Food in the Last Year: Never true   Transportation Needs: No Transportation Needs   • Lack of Transportation (Medical): No   • Lack of Transportation (Non-Medical):  No   Physical Activity: Not on file   Stress: Not on file   Social Connections: Not on file Intimate Partner Violence: Not on file   Housing Stability: Not on file        Current Outpatient Medications:   •  Accu-Chek FastClix Lancets MISC, Inject under the skin daily, Disp: 100 each, Rfl: 0  •  acetaminophen (TYLENOL) 325 mg tablet, Take 2 tablets (650 mg total) by mouth every 6 (six) hours as needed for mild pain, headaches or fever (Patient not taking: Reported on 9/2/2022), Disp: 30 tablet, Rfl: 0  •  aspirin (RA Aspirin EC) 81 mg EC tablet, Take 1 tablet (81 mg total) by mouth daily, Disp: 90 tablet, Rfl: 0  •  Blood Glucose Monitoring Suppl (ONE TOUCH ULTRA 2) w/Device KIT, Use daily, Disp: 100 kit, Rfl: 0  •  Disposable Gloves (LATEX GLOVES LARGE) MISC, Use as needed up to 3 times a day dispo 2 boxes, Disp: 2 each, Rfl: 11  •  ergocalciferol (VITAMIN D2) 50,000 units, Take 1 capsule (50,000 Units total) by mouth once a week (Patient not taking: Reported on 9/2/2022), Disp: 12 capsule, Rfl: 1  •  glucose blood (Accu-Chek Guide) test strip, Use 1 each daily Use as instructed, Disp: 100 each, Rfl: 0  •  ibuprofen (MOTRIN) 800 mg tablet, Take 1 tablet (800 mg total) by mouth every 8 (eight) hours as needed for mild pain, Disp: 60 tablet, Rfl: 0  •  Incontinence Supply Disposable (SUNBEAM INCONTINENT UNDER PAD) MISC, Use 1 per week, dispense 4 reusable underpads, Disp: 4 each, Rfl: 11  •  Incontinence Supply Disposable MISC, by Does not apply route 2 (two) times a day, Disp: 60 each, Rfl: 11  •  naloxone (NARCAN) 4 mg/0 1 mL nasal spray, Administer 1 spray into a nostril  If no response after 2-3 minutes, give another dose in the other nostril using a new spray   (Patient not taking: Reported on 9/2/2022), Disp: 1 each, Rfl: 1  •  Nutritional Supplements (Glucerna Shake) LIQD, Take 3 Cans by mouth 3 (three) times a day (Patient not taking: Reported on 9/2/2022), Disp: 15391 mL, Rfl: 11  •  omeprazole (PriLOSEC) 40 MG capsule, Take 1 capsule (40 mg total) by mouth daily, Disp: 90 capsule, Rfl: 0  • oxyCODONE (ROXICODONE) 20 MG TABS, Take 1 tablet (20 mg total) by mouth 3 (three) times a day as needed for moderate pain For ongoing pain Max Daily Amount: 60 mg, Disp: 90 tablet, Rfl: 0  •  polyethylene glycol (GOLYTELY) 4000 mL solution, Take 4,000 mL by mouth once for 1 dose, Disp: 4000 mL, Rfl: 0  •  sodium hypochlorite (DAKIN'S HALF-STRENGTH) external solution, Soak gauze and pack into wounds with each dressing change as directed , Disp: 473 mL, Rfl: 1    Review of Systems   Constitutional: Negative for chills and fever  HENT: Negative for ear pain and sore throat  Eyes: Negative for pain and visual disturbance  Respiratory: Negative for cough and shortness of breath  Cardiovascular: Negative for chest pain and palpitations  Gastrointestinal: Negative for abdominal pain and vomiting  Skin: Positive for wound (Sacrum, perineum and hip)  Negative for color change and rash  Neurological: Positive for weakness and numbness  Psychiatric/Behavioral: Negative for agitation and behavioral problems  All other systems reviewed and are negative  Objective:  /80   Pulse 72   Temp (!) 97 3 °F (36 3 °C)   Resp 18   Pain Score: 0-No pain     Physical Exam  Vitals and nursing note reviewed  Exam conducted with a chaperone present  Constitutional:       Appearance: Normal appearance  Cardiovascular:      Rate and Rhythm: Normal rate  Pulmonary:      Effort: Pulmonary effort is normal    Abdominal:      Comments: Ostomy   Musculoskeletal:      Comments: Paraplegia  Right hip disarticulation and removal   Skin:     General: Skin is warm and dry  Findings: Wound present  No erythema  Comments: See complete wound assessment  Bridge of skin not attached to the base of the sacrum  No signs of infection in any of the pressure injuries  Neurological:      Mental Status: He is alert     Psychiatric:         Mood and Affect: Mood normal          Behavior: Behavior normal  Wound 11/05/21 Pressure Injury Perineum (Active)   Wound Image Images linked 11/11/22 1010       Debridement   Wound 11/05/21 Pressure Injury Perineum    Universal Protocol:  Consent: Verbal consent obtained  Written consent obtained  Consent given by: patient  Time out: Immediately prior to procedure a "time out" was called to verify the correct patient, procedure, equipment, support staff and site/side marked as required  Patient understanding: patient states understanding of the procedure being performed  Patient identity confirmed: verbally with patient      Performed by: physician  Debridement type: surgical  Level of debridement: subcutaneous tissue  Pain control: lidocaine 4%  Post-debridement measurements  Length (cm): 2  Width (cm): 0 5  Depth (cm): 0 2  Percent debrided: 100%  Surface Area (cm^2): 1  Area debrided (cm^2): 1  Volume (cm^3): 0 2  Tissue and other material debrided: dermis, epidermis, subcutaneous tissue and skin bridge  Devitalized tissue debrided: skin bridge  Instrument(s) utilized: blade and forceps  Bleeding: medium  Hemostasis obtained with: pressure and silver nitrate  Procedural pain (0-10): 0  Post-procedural pain: 0   Response to treatment: procedure was tolerated well  Debridement Comments: Excision of non adhered skin bridge  Wound Instructions:  Orders Placed This Encounter   Procedures   • Wound cleansing and dressings     Wound cleansing and dressings           Wound Left Hip  Cleanse/Irrigate wound with normal saline  Apply skin prep to periwound  Apply allevyn bordered foam to hip wound   Change three times a week or as needed for drainage      R lateral back:  Gently pack with saline moistened gauze  Tape the tail  Cover with ABD  Secure with tape  Change daily or as needed for drainage      Perineum and Left Buttock Wound:  Skin prep to periwound  Gently pack with aquacel ag rope  Extend into left buttock and Tape tail    Cover with allevyn life  Change three times a week or as needed for drainage      You need to keep pressure off of right back wound          HOME CARE  Continue home care services/skilled nursing - 3 x per week (family to do in between), daily visits for one week to pack right back wound   Silver nitrate used at visit to perineum wound  Area may appear gray/black for a few days with increased drainage      OFF-LOADING  Avoid pressure at wound site  - all wounds, including right back  Wheelchair Cushion  - ROHO cushion  Do Not Sit for Long Periods of Time  - 1 hr max for meals only, otherwise in bed and turn side to side at least  every 3 hours or more frequently  Reposition in regular bed for now at least every 1-2 hours while awake      Tissue sample sent to lab  Follow up in 4 weeks with Dr Geronimo Lantigua treatment note: Cleansed with NSS and dressed as above  Standing Status:   Future     Standing Expiration Date:   11/11/2023   • Debridement     This order was created via procedure documentation             Laura Babcock MD, MD, CHT, CWS    Portions of the record may have been created with voice recognition software  Occasional wrong word or "sound alike" substitutions may have occurred due to the inherent limitations of voice recognition software  Read the chart carefully and recognize, using context, where substitutions have occurred

## 2022-11-11 NOTE — PATIENT INSTRUCTIONS
Orders Placed This Encounter   Procedures    Wound cleansing and dressings     Wound cleansing and dressings           Wound Left Hip  Cleanse/Irrigate wound with normal saline  Apply skin prep to periwound  Apply allevyn bordered foam to hip wound   Change three times a week or as needed for drainage  R lateral back:  Gently pack with saline moistened gauze  Tape the tail  Cover with ABD  Secure with tape  Change daily or as needed for drainage  Perineum and Left Buttock Wound:  Skin prep to periwound  Gently pack with aquacel ag rope  Extend into left buttock and Tape tail  Cover with allevyn life  Change three times a week or as needed for drainage  You need to keep pressure off of right back wound  HOME CARE  Continue home care services/skilled nursing - 3 x per week (family to do in between), daily visits for one week to pack right back wound   Silver nitrate used at visit to perineum wound  Area may appear gray/black for a few days with increased drainage  OFF-LOADING  Avoid pressure at wound site  - all wounds, including right back  Wheelchair Cushion  - ROHO cushion  Do Not Sit for Long Periods of Time  - 1 hr max for meals only, otherwise in bed and turn side to side at least  every 3 hours or more frequently  Reposition in regular bed for now at least every 1-2 hours while awake  Tissue sample sent to lab  Follow up in 4 weeks with Dr Kuldeep Briones treatment note: Cleansed with NSS and dressed as above       Standing Status:   Future     Standing Expiration Date:   11/11/2023

## 2022-11-14 ENCOUNTER — HOME CARE VISIT (OUTPATIENT)
Dept: HOME HEALTH SERVICES | Facility: HOME HEALTHCARE | Age: 62
End: 2022-11-14

## 2022-11-15 VITALS
HEART RATE: 82 BPM | DIASTOLIC BLOOD PRESSURE: 60 MMHG | OXYGEN SATURATION: 97 % | TEMPERATURE: 97.8 F | SYSTOLIC BLOOD PRESSURE: 110 MMHG | RESPIRATION RATE: 18 BRPM

## 2022-11-15 PROCEDURE — 10330064 FOAM, ADH SIL W/BORDER SACRAL 7"X7" (10/

## 2022-11-15 PROCEDURE — 10330064 DRESSING, HYDROCELLULAR ADH STR FOAM 4"X

## 2022-11-15 PROCEDURE — 10330064 SPONGE, GAUZE 8PLY N/S 4"X4" (200/PK 20P

## 2022-11-15 PROCEDURE — 10330064 CLEANSER, WND SEA-CLEANS 6OZ  COLPLT

## 2022-11-15 PROCEDURE — 10330064 TAPE, RETENTION 2"X10YDS (1/BX24BX/CS)

## 2022-11-15 PROCEDURE — 10330064 PAD, ABD 5X9" STR LF (1/PK 20PK/BX) MGM1

## 2022-11-15 PROCEDURE — 10330064 DRESSING, CALCIUM ALGINATE ROPE 3/4"X12"

## 2022-11-15 PROCEDURE — 10330064 BANDAGE, GAUZE COTTON  6PLY STR 4"X4YDS

## 2022-11-16 ENCOUNTER — HOME CARE VISIT (OUTPATIENT)
Dept: HOME HEALTH SERVICES | Facility: HOME HEALTHCARE | Age: 62
End: 2022-11-16

## 2022-11-16 ENCOUNTER — TELEPHONE (OUTPATIENT)
Dept: WOUND CARE | Facility: CLINIC | Age: 62
End: 2022-11-16

## 2022-11-16 VITALS
OXYGEN SATURATION: 98 % | SYSTOLIC BLOOD PRESSURE: 92 MMHG | HEART RATE: 80 BPM | DIASTOLIC BLOOD PRESSURE: 64 MMHG | TEMPERATURE: 96.9 F | RESPIRATION RATE: 12 BRPM

## 2022-11-16 PROCEDURE — 10330064 SPONGE, EXCILON SPLIT DRN STR 4"X4" (2/P

## 2022-11-16 NOTE — TELEPHONE ENCOUNTER
Phone call received from Gt Rasheed, patient's visiting nurse, who reports his R back wound has "flourescent green" drainage today  Treatment was changed after last wound care visit from Dakin's moistened gauze to moist saline dressings  She is seeking advice  Per Dr Catalina Ram, stop saline dressings and resume Dakin's moistened gauze dressings daily until patient is re-evaluated at his next wound care visit

## 2022-11-17 ENCOUNTER — HOME CARE VISIT (OUTPATIENT)
Dept: HOME HEALTH SERVICES | Facility: HOME HEALTHCARE | Age: 62
End: 2022-11-17

## 2022-11-18 ENCOUNTER — HOME CARE VISIT (OUTPATIENT)
Dept: HOME HEALTH SERVICES | Facility: HOME HEALTHCARE | Age: 62
End: 2022-11-18

## 2022-11-18 VITALS
DIASTOLIC BLOOD PRESSURE: 70 MMHG | HEART RATE: 56 BPM | OXYGEN SATURATION: 98 % | TEMPERATURE: 97.3 F | SYSTOLIC BLOOD PRESSURE: 114 MMHG | RESPIRATION RATE: 16 BRPM

## 2022-11-21 ENCOUNTER — HOME CARE VISIT (OUTPATIENT)
Dept: HOME HEALTH SERVICES | Facility: HOME HEALTHCARE | Age: 62
End: 2022-11-21

## 2022-11-22 VITALS
RESPIRATION RATE: 18 BRPM | HEART RATE: 78 BPM | SYSTOLIC BLOOD PRESSURE: 110 MMHG | DIASTOLIC BLOOD PRESSURE: 60 MMHG | OXYGEN SATURATION: 97 % | TEMPERATURE: 97.8 F

## 2022-11-23 ENCOUNTER — HOME CARE VISIT (OUTPATIENT)
Dept: HOME HEALTH SERVICES | Facility: HOME HEALTHCARE | Age: 62
End: 2022-11-23

## 2022-11-23 VITALS
OXYGEN SATURATION: 96 % | RESPIRATION RATE: 18 BRPM | SYSTOLIC BLOOD PRESSURE: 100 MMHG | HEART RATE: 80 BPM | DIASTOLIC BLOOD PRESSURE: 60 MMHG | TEMPERATURE: 97.5 F

## 2022-11-25 ENCOUNTER — HOME CARE VISIT (OUTPATIENT)
Dept: HOME HEALTH SERVICES | Facility: HOME HEALTHCARE | Age: 62
End: 2022-11-25

## 2022-11-28 ENCOUNTER — HOME CARE VISIT (OUTPATIENT)
Dept: HOME HEALTH SERVICES | Facility: HOME HEALTHCARE | Age: 62
End: 2022-11-28

## 2022-11-28 VITALS
TEMPERATURE: 97.5 F | OXYGEN SATURATION: 97 % | HEART RATE: 78 BPM | RESPIRATION RATE: 18 BRPM | SYSTOLIC BLOOD PRESSURE: 110 MMHG | DIASTOLIC BLOOD PRESSURE: 68 MMHG

## 2022-11-28 DIAGNOSIS — G89.29 CHRONIC LOW BACK PAIN WITHOUT SCIATICA, UNSPECIFIED BACK PAIN LATERALITY: ICD-10-CM

## 2022-11-28 DIAGNOSIS — M54.50 CHRONIC LOW BACK PAIN WITHOUT SCIATICA, UNSPECIFIED BACK PAIN LATERALITY: ICD-10-CM

## 2022-11-28 DIAGNOSIS — L89.324 STAGE IV PRESSURE ULCER OF LEFT BUTTOCK (HCC): ICD-10-CM

## 2022-11-28 DIAGNOSIS — K21.9 GASTROESOPHAGEAL REFLUX DISEASE: ICD-10-CM

## 2022-11-28 PROCEDURE — 10330064 CLEANSER, WND SEA-CLEANS 6OZ  COLPLT

## 2022-11-28 PROCEDURE — 10330064 SPONGE, GAUZE 8PLY N/S 4"X4" (200/PK 20P

## 2022-11-28 PROCEDURE — 10330064 BANDAGE, GAUZE COTTON  6PLY STR 4"X4YDS

## 2022-11-28 PROCEDURE — 10330064 TAPE, RETENTION 2"X10YDS (1/BX24BX/CS)

## 2022-11-28 PROCEDURE — 10330064 PAD, ABD 5X9" STR LF (1/PK 20PK/BX) MGM1

## 2022-11-28 PROCEDURE — 10330064

## 2022-11-28 PROCEDURE — 10330064 DRESSING, HYDROCELLULAR ADH STR FOAM 4"X

## 2022-11-28 RX ORDER — OMEPRAZOLE 40 MG/1
40 CAPSULE, DELAYED RELEASE ORAL DAILY
Qty: 90 CAPSULE | Refills: 0 | Status: SHIPPED | OUTPATIENT
Start: 2022-11-28

## 2022-11-29 ENCOUNTER — TELEPHONE (OUTPATIENT)
Dept: FAMILY MEDICINE CLINIC | Facility: CLINIC | Age: 62
End: 2022-11-29

## 2022-11-29 RX ORDER — ASPIRIN 81 MG/1
81 TABLET ORAL DAILY
Qty: 90 TABLET | Refills: 0 | Status: SHIPPED | OUTPATIENT
Start: 2022-11-29

## 2022-11-29 RX ORDER — IBUPROFEN 800 MG/1
800 TABLET ORAL EVERY 8 HOURS PRN
Qty: 60 TABLET | Refills: 0 | Status: SHIPPED | OUTPATIENT
Start: 2022-11-29

## 2022-11-29 RX ORDER — OXYCODONE HYDROCHLORIDE 20 MG/1
20 TABLET ORAL 3 TIMES DAILY PRN
Qty: 90 TABLET | Refills: 0 | Status: SHIPPED | OUTPATIENT
Start: 2022-11-29

## 2022-11-29 NOTE — TELEPHONE ENCOUNTER
PCP SIGNATURE NEEDED FOR [employer] FORM RECEIVED VIA FAX AND PLACED IN PCP FOLDER TO BE DELIVERED AT ASSIGNED TIMES        Work order [id number]

## 2022-11-30 ENCOUNTER — HOME CARE VISIT (OUTPATIENT)
Dept: HOME HEALTH SERVICES | Facility: HOME HEALTHCARE | Age: 62
End: 2022-11-30

## 2022-11-30 VITALS
OXYGEN SATURATION: 98 % | DIASTOLIC BLOOD PRESSURE: 82 MMHG | HEART RATE: 76 BPM | TEMPERATURE: 97.3 F | RESPIRATION RATE: 12 BRPM | SYSTOLIC BLOOD PRESSURE: 120 MMHG

## 2022-12-01 VITALS
OXYGEN SATURATION: 96 % | DIASTOLIC BLOOD PRESSURE: 60 MMHG | HEART RATE: 70 BPM | TEMPERATURE: 97.3 F | SYSTOLIC BLOOD PRESSURE: 110 MMHG | RESPIRATION RATE: 18 BRPM

## 2022-12-02 ENCOUNTER — HOME CARE VISIT (OUTPATIENT)
Dept: HOME HEALTH SERVICES | Facility: HOME HEALTHCARE | Age: 62
End: 2022-12-02

## 2022-12-02 PROCEDURE — 10330064 SPONGE, GAUZE 8PLY N/S 4"X4" (200/PK 20P

## 2022-12-02 PROCEDURE — 10330064 BANDAGE, GAUZE COTTON  6PLY STR 4"X4YDS

## 2022-12-02 PROCEDURE — 10330064 TAPE, RETENTION 2"X10YDS (1/BX24BX/CS)

## 2022-12-02 PROCEDURE — 10330064 DRESSING, HYDROCELLULAR ADH STR FOAM 4"X

## 2022-12-02 PROCEDURE — 10330064 CLEANSER, WND SEA-CLEANS 6OZ  COLPLT

## 2022-12-02 PROCEDURE — 10330064 PAD, ABD 5X9" STR LF (1/PK 20PK/BX) MGM1

## 2022-12-03 VITALS
HEART RATE: 72 BPM | TEMPERATURE: 97.3 F | DIASTOLIC BLOOD PRESSURE: 70 MMHG | OXYGEN SATURATION: 97 % | SYSTOLIC BLOOD PRESSURE: 118 MMHG | RESPIRATION RATE: 18 BRPM

## 2022-12-05 ENCOUNTER — HOME CARE VISIT (OUTPATIENT)
Dept: HOME HEALTH SERVICES | Facility: HOME HEALTHCARE | Age: 62
End: 2022-12-05

## 2022-12-05 VITALS
DIASTOLIC BLOOD PRESSURE: 80 MMHG | SYSTOLIC BLOOD PRESSURE: 126 MMHG | OXYGEN SATURATION: 95 % | HEART RATE: 88 BPM | TEMPERATURE: 96.5 F | RESPIRATION RATE: 16 BRPM

## 2022-12-05 PROCEDURE — 10330064

## 2022-12-05 PROCEDURE — 10330064 CATH TRAY, FOLEY SYR 10CC (20/CS)

## 2022-12-05 PROCEDURE — 10330064 BANDAGE, GAUZE COTTON  6PLY STR 4"X4YDS

## 2022-12-05 PROCEDURE — 10330064 TAPE, RETENTION 2"X10YDS (1/BX24BX/CS)

## 2022-12-05 PROCEDURE — 10330064 SYRINGE, LL 10CC (100/BX 12BX/CS)

## 2022-12-05 PROCEDURE — 10330064 DRESSING, WND ALLEVYN LIFE SACRUM STR SM

## 2022-12-05 PROCEDURE — 10330064 FOAM, ADH SIL W/BORDER 4"X4" (10/BX 20BX

## 2022-12-05 PROCEDURE — 10330064 PAD, ABD 5X9" STR LF (1/PK 20PK/BX) MGM1

## 2022-12-05 PROCEDURE — 10330064 CATHETER, FOLEY 2WAY 5CC 16FR (10/BX)

## 2022-12-05 PROCEDURE — 10330064 SYRINGE, CATH TIP FLAT STR 60CC (50/CS)

## 2022-12-05 PROCEDURE — 10330064 SPONGE, GAUZE 8PLY N/S 4"X4" (200/PK 20P

## 2022-12-05 NOTE — CASE COMMUNICATION
Ship to x Pt  Home  Insurance  ab     Wound 1 x      Full x        Wound 2 x      Full x        All items are ordered by the each unless otherwise noted  PLEASE DO NOT ORDER BY THE BOX  Request specific quantity needed  For Private Insurances order should be for a 2 week period    [employer]  16fr 5cc cath [id number] 2   Red rubber straight catheter (for flushing wound tunnels) 2  Syringe 60cc [id number] 2   Syringe 10cc [id number] 1    10c c cath tray [id number] 2   Leg bag large 32 oz BARD flip chan valve 2  Gauze 4x4 N/S 200 4x4s per unit  [id number] 2   Gauze Kerlix (fluff roll) 4inch sterile [id number] 3  ABD 5x9 [id number] 14  Tape   Mefix 2inch [id number] 2  Aquacel AG rope 14    Silicone Foam with border 4x4 NOT STOCKED [id number] 7  Allevyn life bordered adhesive Foam sacral 7

## 2022-12-06 RX ORDER — SODIUM CHLORIDE 9 MG/ML
125 INJECTION, SOLUTION INTRAVENOUS CONTINUOUS
Status: CANCELLED | OUTPATIENT
Start: 2022-12-06

## 2022-12-07 ENCOUNTER — HOME CARE VISIT (OUTPATIENT)
Dept: HOME HEALTH SERVICES | Facility: HOME HEALTHCARE | Age: 62
End: 2022-12-07

## 2022-12-07 VITALS
HEART RATE: 72 BPM | RESPIRATION RATE: 22 BRPM | DIASTOLIC BLOOD PRESSURE: 68 MMHG | OXYGEN SATURATION: 97 % | TEMPERATURE: 98.2 F | SYSTOLIC BLOOD PRESSURE: 114 MMHG

## 2022-12-09 ENCOUNTER — HOME CARE VISIT (OUTPATIENT)
Dept: HOME HEALTH SERVICES | Facility: HOME HEALTHCARE | Age: 62
End: 2022-12-09

## 2022-12-09 VITALS
HEART RATE: 80 BPM | TEMPERATURE: 98 F | DIASTOLIC BLOOD PRESSURE: 60 MMHG | OXYGEN SATURATION: 100 % | RESPIRATION RATE: 18 BRPM | SYSTOLIC BLOOD PRESSURE: 120 MMHG

## 2022-12-12 ENCOUNTER — HOME CARE VISIT (OUTPATIENT)
Dept: HOME HEALTH SERVICES | Facility: HOME HEALTHCARE | Age: 62
End: 2022-12-12

## 2022-12-14 ENCOUNTER — TELEPHONE (OUTPATIENT)
Dept: GASTROENTEROLOGY | Facility: CLINIC | Age: 62
End: 2022-12-14

## 2022-12-14 ENCOUNTER — HOME CARE VISIT (OUTPATIENT)
Dept: HOME HEALTH SERVICES | Facility: HOME HEALTHCARE | Age: 62
End: 2022-12-14

## 2022-12-14 VITALS
DIASTOLIC BLOOD PRESSURE: 80 MMHG | TEMPERATURE: 96.6 F | SYSTOLIC BLOOD PRESSURE: 120 MMHG | HEART RATE: 52 BPM | RESPIRATION RATE: 16 BRPM

## 2022-12-14 DIAGNOSIS — R19.5 HEME POSITIVE STOOL: ICD-10-CM

## 2022-12-14 NOTE — CASE COMMUNICATION
Ship to  Pt   Home  Insurance  AB   Ostomy  include Brand and order number (ordered by the box)  NOT STOCKED  Darin     Pouches rEF [address]     1  Wafers REF  [id number]     4

## 2022-12-14 NOTE — TELEPHONE ENCOUNTER
Spoke to patient and rescheduled missed colonoscopy (pt states he has the flu) to next available  Scheduled for 1/30/2023 with Dr Hubert Eugene @ [facility]  Pt switched to Dr Hubert Eugene due to sooner procedure date  Dr Hubert Eugene,  Could you please sent Golytely again to his pharmacy on file? Thank you

## 2022-12-15 PROCEDURE — 10330064

## 2022-12-16 ENCOUNTER — HOME CARE VISIT (OUTPATIENT)
Dept: HOME HEALTH SERVICES | Facility: HOME HEALTHCARE | Age: 62
End: 2022-12-16

## 2022-12-18 VITALS
SYSTOLIC BLOOD PRESSURE: 118 MMHG | RESPIRATION RATE: 20 BRPM | HEART RATE: 84 BPM | DIASTOLIC BLOOD PRESSURE: 70 MMHG | OXYGEN SATURATION: 97 % | TEMPERATURE: 97.4 F

## 2022-12-19 ENCOUNTER — HOME CARE VISIT (OUTPATIENT)
Dept: HOME HEALTH SERVICES | Facility: HOME HEALTHCARE | Age: 62
End: 2022-12-19

## 2022-12-21 ENCOUNTER — HOME CARE VISIT (OUTPATIENT)
Dept: HOME HEALTH SERVICES | Facility: HOME HEALTHCARE | Age: 62
End: 2022-12-21

## 2022-12-21 VITALS
SYSTOLIC BLOOD PRESSURE: 110 MMHG | OXYGEN SATURATION: 97 % | RESPIRATION RATE: 22 BRPM | TEMPERATURE: 97.4 F | DIASTOLIC BLOOD PRESSURE: 60 MMHG | HEART RATE: 74 BPM

## 2022-12-21 VITALS
DIASTOLIC BLOOD PRESSURE: 66 MMHG | HEART RATE: 64 BPM | OXYGEN SATURATION: 97 % | RESPIRATION RATE: 20 BRPM | SYSTOLIC BLOOD PRESSURE: 118 MMHG | TEMPERATURE: 97.8 F

## 2022-12-23 ENCOUNTER — HOME CARE VISIT (OUTPATIENT)
Dept: HOME HEALTH SERVICES | Facility: HOME HEALTHCARE | Age: 62
End: 2022-12-23

## 2022-12-26 ENCOUNTER — HOME CARE VISIT (OUTPATIENT)
Dept: HOME HEALTH SERVICES | Facility: HOME HEALTHCARE | Age: 62
End: 2022-12-26

## 2022-12-27 VITALS
HEART RATE: 78 BPM | TEMPERATURE: 98 F | SYSTOLIC BLOOD PRESSURE: 116 MMHG | DIASTOLIC BLOOD PRESSURE: 70 MMHG | OXYGEN SATURATION: 98 % | RESPIRATION RATE: 18 BRPM

## 2022-12-28 ENCOUNTER — HOME CARE VISIT (OUTPATIENT)
Dept: HOME HEALTH SERVICES | Facility: HOME HEALTHCARE | Age: 62
End: 2022-12-28

## 2022-12-28 DIAGNOSIS — E11.9 TYPE 2 DIABETES MELLITUS WITHOUT COMPLICATION, WITHOUT LONG-TERM CURRENT USE OF INSULIN (HCC): ICD-10-CM

## 2022-12-28 DIAGNOSIS — G89.29 CHRONIC LOW BACK PAIN WITHOUT SCIATICA, UNSPECIFIED BACK PAIN LATERALITY: ICD-10-CM

## 2022-12-28 DIAGNOSIS — M54.50 CHRONIC LOW BACK PAIN WITHOUT SCIATICA, UNSPECIFIED BACK PAIN LATERALITY: ICD-10-CM

## 2022-12-28 DIAGNOSIS — L89.324 STAGE IV PRESSURE ULCER OF LEFT BUTTOCK (HCC): ICD-10-CM

## 2022-12-29 RX ORDER — IBUPROFEN 800 MG/1
800 TABLET ORAL EVERY 8 HOURS PRN
Qty: 60 TABLET | Refills: 0 | Status: SHIPPED | OUTPATIENT
Start: 2022-12-29

## 2022-12-29 RX ORDER — LANCETS
EACH MISCELLANEOUS DAILY
Qty: 100 EACH | Refills: 0 | Status: SHIPPED | OUTPATIENT
Start: 2022-12-29

## 2022-12-29 RX ORDER — OXYCODONE HYDROCHLORIDE 20 MG/1
20 TABLET ORAL 3 TIMES DAILY PRN
Qty: 90 TABLET | Refills: 0 | Status: SHIPPED | OUTPATIENT
Start: 2022-12-29

## 2022-12-29 RX ORDER — BLOOD SUGAR DIAGNOSTIC
1 STRIP MISCELLANEOUS DAILY
Qty: 100 EACH | Refills: 0 | Status: SHIPPED | OUTPATIENT
Start: 2022-12-29

## 2022-12-29 RX ORDER — ASPIRIN 81 MG/1
81 TABLET ORAL DAILY
Qty: 90 TABLET | Refills: 0 | Status: SHIPPED | OUTPATIENT
Start: 2022-12-29

## 2022-12-29 NOTE — CASE COMMUNICATION
This is to notify you of a deviation in the visit pattern this week, pt declined to be seen by nursing on monday due to the holiday

## 2022-12-30 ENCOUNTER — HOME CARE VISIT (OUTPATIENT)
Dept: HOME HEALTH SERVICES | Facility: HOME HEALTHCARE | Age: 62
End: 2022-12-30

## 2022-12-30 ENCOUNTER — TELEPHONE (OUTPATIENT)
Dept: FAMILY MEDICINE CLINIC | Facility: CLINIC | Age: 62
End: 2022-12-30

## 2022-12-30 ENCOUNTER — TELEPHONE (OUTPATIENT)
Dept: WOUND CARE | Facility: CLINIC | Age: 62
End: 2022-12-30

## 2022-12-30 NOTE — TELEPHONE ENCOUNTER
FLORENCIO called the wound center to change dressing frequency from three times a week to every other day, while his wife is on vacation  The patient's wife will be gone for three weeks  The nurse changed order in [address] for [facility] to review

## 2022-12-30 NOTE — TELEPHONE ENCOUNTER
PCP SIGNATURE NEEDED FOR [facility] FORM RECEIVED VIA FAX AND PLACED IN PCP FOLDER TO BE DELIVERED AT ASSIGNED TIMES

## 2022-12-30 NOTE — CASE COMMUNICATION
This is to notify you of a deviation in the visit pattern for week of 1/2/23   pt declined nursing vs on 1/2/23, no response needed
CHEST PAIN

## 2022-12-31 VITALS
SYSTOLIC BLOOD PRESSURE: 120 MMHG | TEMPERATURE: 97.1 F | OXYGEN SATURATION: 96 % | DIASTOLIC BLOOD PRESSURE: 60 MMHG | RESPIRATION RATE: 18 BRPM | HEART RATE: 78 BPM

## 2023-01-01 ENCOUNTER — HOME CARE VISIT (OUTPATIENT)
Dept: HOME HEALTH SERVICES | Facility: HOME HEALTHCARE | Age: 63
End: 2023-01-01

## 2023-01-02 ENCOUNTER — HOME CARE VISIT (OUTPATIENT)
Dept: HOME HEALTH SERVICES | Facility: HOME HEALTHCARE | Age: 63
End: 2023-01-02

## 2023-01-02 VITALS
SYSTOLIC BLOOD PRESSURE: 128 MMHG | HEART RATE: 77 BPM | TEMPERATURE: 96.8 F | OXYGEN SATURATION: 96 % | RESPIRATION RATE: 18 BRPM | DIASTOLIC BLOOD PRESSURE: 74 MMHG

## 2023-01-02 PROCEDURE — 10330064 WIPE, SKIN GEL PROT DRSNG (50/BX)

## 2023-01-02 PROCEDURE — 10330064 CLEANSER, WND SEA-CLEANS 6OZ  COLPLT

## 2023-01-02 PROCEDURE — 10330064 SYRINGE, LL 10CC (100/BX 12BX/CS)

## 2023-01-02 PROCEDURE — 10330064 TAPE, RETENTION 2"X10YDS (1/BX24BX/CS)

## 2023-01-02 PROCEDURE — 10330064 CATHETER, FOLEY 2WAY 5CC 16FR (10/BX)

## 2023-01-02 PROCEDURE — 10330064 SPONGE, GAUZE 8PLY N/S 4"X4" (200/PK 20P

## 2023-01-02 PROCEDURE — 10330064 PAD, ABD 5X9" STR LF (1/PK 20PK/BX) MGM1

## 2023-01-02 PROCEDURE — 10330064 CATH TRAY, FOLEY SYR 10CC (20/CS)

## 2023-01-02 PROCEDURE — 10330064 BANDAGE, GAUZE COTTON  6PLY STR 4"X4YDS

## 2023-01-02 PROCEDURE — 10330064 FOAM, ADH SIL W/BORDER SACRAL 7"X7" (10/

## 2023-01-02 PROCEDURE — 10330064

## 2023-01-03 ENCOUNTER — HOME CARE VISIT (OUTPATIENT)
Dept: HOME HEALTH SERVICES | Facility: HOME HEALTHCARE | Age: 63
End: 2023-01-03

## 2023-01-03 VITALS
TEMPERATURE: 96.9 F | HEART RATE: 59 BPM | OXYGEN SATURATION: 99 % | RESPIRATION RATE: 18 BRPM | SYSTOLIC BLOOD PRESSURE: 120 MMHG | DIASTOLIC BLOOD PRESSURE: 80 MMHG

## 2023-01-03 VITALS
TEMPERATURE: 97.3 F | SYSTOLIC BLOOD PRESSURE: 110 MMHG | HEART RATE: 76 BPM | OXYGEN SATURATION: 97 % | DIASTOLIC BLOOD PRESSURE: 70 MMHG | RESPIRATION RATE: 18 BRPM

## 2023-01-04 PROCEDURE — 10330064 PAD, ABD 5X9" STR LF (1/PK 20PK/BX) MGM1

## 2023-01-05 ENCOUNTER — HOME CARE VISIT (OUTPATIENT)
Dept: HOME HEALTH SERVICES | Facility: HOME HEALTHCARE | Age: 63
End: 2023-01-05

## 2023-01-07 ENCOUNTER — HOME CARE VISIT (OUTPATIENT)
Dept: HOME HEALTH SERVICES | Facility: HOME HEALTHCARE | Age: 63
End: 2023-01-07

## 2023-01-07 VITALS — TEMPERATURE: 97.3 F | SYSTOLIC BLOOD PRESSURE: 118 MMHG | HEART RATE: 84 BPM | DIASTOLIC BLOOD PRESSURE: 70 MMHG

## 2023-01-08 VITALS
OXYGEN SATURATION: 97 % | TEMPERATURE: 98 F | HEART RATE: 76 BPM | RESPIRATION RATE: 18 BRPM | SYSTOLIC BLOOD PRESSURE: 110 MMHG | DIASTOLIC BLOOD PRESSURE: 80 MMHG

## 2023-01-09 ENCOUNTER — HOME CARE VISIT (OUTPATIENT)
Dept: HOME HEALTH SERVICES | Facility: HOME HEALTHCARE | Age: 63
End: 2023-01-09

## 2023-01-09 VITALS
SYSTOLIC BLOOD PRESSURE: 116 MMHG | DIASTOLIC BLOOD PRESSURE: 74 MMHG | TEMPERATURE: 97.2 F | OXYGEN SATURATION: 99 % | HEART RATE: 64 BPM | RESPIRATION RATE: 16 BRPM

## 2023-01-10 ENCOUNTER — OFFICE VISIT (OUTPATIENT)
Dept: FAMILY MEDICINE CLINIC | Facility: CLINIC | Age: 63
End: 2023-01-10

## 2023-01-10 VITALS
DIASTOLIC BLOOD PRESSURE: 86 MMHG | SYSTOLIC BLOOD PRESSURE: 118 MMHG | RESPIRATION RATE: 14 BRPM | TEMPERATURE: 97.7 F | OXYGEN SATURATION: 97 % | HEART RATE: 60 BPM

## 2023-01-10 DIAGNOSIS — G82.20 PARAPLEGIC SPINAL PARALYSIS (HCC): ICD-10-CM

## 2023-01-10 DIAGNOSIS — L89.324 PRESSURE ULCER OF LEFT BUTTOCK, STAGE 4 (HCC): ICD-10-CM

## 2023-01-10 DIAGNOSIS — E11.9 TYPE 2 DIABETES MELLITUS WITHOUT COMPLICATION, WITHOUT LONG-TERM CURRENT USE OF INSULIN (HCC): Primary | ICD-10-CM

## 2023-01-10 DIAGNOSIS — Z93.3 COLOSTOMY IN PLACE (HCC): ICD-10-CM

## 2023-01-10 DIAGNOSIS — F11.20 CONTINUOUS OPIOID DEPENDENCE (HCC): ICD-10-CM

## 2023-01-10 LAB — SL AMB POCT HEMOGLOBIN AIC: 5.6 (ref ?–6.5)

## 2023-01-10 NOTE — PROGRESS NOTES
Name: Jacqueline Pereira      : 1960      MRN: [id number]  Encounter Provider: Goyo Grove MD  Encounter Date: 1/10/2023   Encounter department: [address] [place] [address]     1  Type 2 diabetes mellitus without complication, without long-term current use of insulin ([facility])  -     POCT hemoglobin A1c    2  Pressure ulcer of left buttock, stage 4 ([facility])    3  Paraplegic spinal paralysis ([facility])  Assessment & Plan:  Uses motorized wheelchair  Frequent repositioning at home       4  Colostomy in place [facility])  Assessment & Plan:  No issues      5  Continuous opioid dependence [facility])  Assessment & Plan:  PDMP reviewed  Last refill was 2022  Risks of Opioid medications reviewed with patient  Narcotic contract reviewed with patient  Patient signed and completed Opioid contract    Completed contract placed in   bin              Subjective      HPI  Review of Systems    Current Outpatient Medications on File Prior to Visit   Medication Sig   • Accu-Chek FastClix Lancets MISC Inject under the skin daily   • acetaminophen (TYLENOL) 325 mg tablet Take 2 tablets (650 mg total) by mouth every 6 (six) hours as needed for mild pain, headaches or fever (Patient not taking: Reported on 2022)   • aspirin (RA Aspirin EC) 81 mg EC tablet Take 1 tablet (81 mg total) by mouth daily   • Blood Glucose Monitoring Suppl (ONE TOUCH ULTRA 2) w/Device KIT Use daily   • Disposable Gloves (LATEX GLOVES LARGE) MISC Use as needed up to 3 times a day dispo 2 boxes   • ergocalciferol (VITAMIN D2) 50,000 units Take 1 capsule (50,000 Units total) by mouth once a week (Patient not taking: Reported on 2022)   • glucose blood (Accu-Chek Guide) test strip Use 1 each daily Use as instructed   • ibuprofen (MOTRIN) 800 mg tablet Take 1 tablet (800 mg total) by mouth every 8 (eight) hours as needed for mild pain   • Incontinence Supply Disposable (SUNBEAM INCONTINENT UNDER PAD) MISC Use 1 per week, dispense 4 reusable underpads   • Incontinence Supply Disposable MISC by Does not apply route 2 (two) times a day   • naloxone (NARCAN) 4 mg/0 1 mL nasal spray Administer 1 spray into a nostril  If no response after 2-3 minutes, give another dose in the other nostril using a new spray  (Patient not taking: Reported on 9/2/2022)   • Nutritional Supplements (Glucerna Shake) LIQD Take 3 Cans by mouth 3 (three) times a day (Patient not taking: Reported on 9/2/2022)   • omeprazole (PriLOSEC) 40 MG capsule Take 1 capsule (40 mg total) by mouth daily   • oxyCODONE (ROXICODONE) 20 MG TABS Take 1 tablet (20 mg total) by mouth 3 (three) times a day as needed for moderate pain For ongoing pain Max Daily Amount: 60 mg   • polyethylene glycol (GOLYTELY) 4000 mL solution Take 4,000 mL by mouth once for 1 dose   • sodium hypochlorite (DAKIN'S HALF-STRENGTH) external solution Soak gauze and pack into wounds with each dressing change as directed         Objective     /86 (BP Location: Left arm, Patient Position: Sitting, Cuff Size: Standard)   Pulse 60   Temp 97 7 °F (36 5 °C) (Temporal)   Resp 14   SpO2 97%     Physical Exam  Jersey Pabon MD

## 2023-01-10 NOTE — ASSESSMENT & PLAN NOTE
PDMP reviewed  Last refill was 12/29/2022  Risks of Opioid medications reviewed with patient  Narcotic contract reviewed with patient  Patient signed and completed Opioid contract    Completed contract placed in   bin

## 2023-01-11 ENCOUNTER — HOME CARE VISIT (OUTPATIENT)
Dept: HOME HEALTH SERVICES | Facility: HOME HEALTHCARE | Age: 63
End: 2023-01-11

## 2023-01-11 VITALS
OXYGEN SATURATION: 99 % | SYSTOLIC BLOOD PRESSURE: 112 MMHG | RESPIRATION RATE: 12 BRPM | HEART RATE: 60 BPM | DIASTOLIC BLOOD PRESSURE: 78 MMHG | TEMPERATURE: 97.4 F

## 2023-01-11 PROCEDURE — 10330064 CATHETER, FOLEY 2WAY 5CC 16FR (10/BX)

## 2023-01-11 PROCEDURE — 10330064

## 2023-01-11 PROCEDURE — 10330064 CATH TRAY, FOLEY SYR 10CC (20/CS)

## 2023-01-11 NOTE — CASE COMMUNICATION
Ship to  Pt   Home  Insurance  ab   [employer]  16fr 5cc cath [id number] 1   10cc cath tray [id number] 1   Leg bag large BARD flip chan valve 2

## 2023-01-13 ENCOUNTER — HOME CARE VISIT (OUTPATIENT)
Dept: HOME HEALTH SERVICES | Facility: HOME HEALTHCARE | Age: 63
End: 2023-01-13

## 2023-01-15 ENCOUNTER — HOME CARE VISIT (OUTPATIENT)
Dept: HOME HEALTH SERVICES | Facility: HOME HEALTHCARE | Age: 63
End: 2023-01-15

## 2023-01-15 VITALS
DIASTOLIC BLOOD PRESSURE: 68 MMHG | SYSTOLIC BLOOD PRESSURE: 120 MMHG | TEMPERATURE: 97.4 F | OXYGEN SATURATION: 97 % | HEART RATE: 78 BPM | RESPIRATION RATE: 18 BRPM

## 2023-01-15 VITALS
OXYGEN SATURATION: 97 % | SYSTOLIC BLOOD PRESSURE: 110 MMHG | HEART RATE: 76 BPM | DIASTOLIC BLOOD PRESSURE: 60 MMHG | TEMPERATURE: 97.3 F | RESPIRATION RATE: 18 BRPM

## 2023-01-16 DIAGNOSIS — R97.20 ELEVATED PSA: Primary | ICD-10-CM

## 2023-01-16 DIAGNOSIS — N31.9 NEUROGENIC BLADDER: ICD-10-CM

## 2023-01-16 PROCEDURE — 10330064 SPONGE, GAUZE 8PLY N/S 4"X4" (200/PK 20P

## 2023-01-17 ENCOUNTER — HOME CARE VISIT (OUTPATIENT)
Dept: HOME HEALTH SERVICES | Facility: HOME HEALTHCARE | Age: 63
End: 2023-01-17

## 2023-01-17 VITALS
TEMPERATURE: 97.3 F | SYSTOLIC BLOOD PRESSURE: 120 MMHG | OXYGEN SATURATION: 97 % | RESPIRATION RATE: 20 BRPM | HEART RATE: 72 BPM | DIASTOLIC BLOOD PRESSURE: 70 MMHG

## 2023-01-19 ENCOUNTER — HOME CARE VISIT (OUTPATIENT)
Dept: HOME HEALTH SERVICES | Facility: HOME HEALTHCARE | Age: 63
End: 2023-01-19

## 2023-01-19 VITALS
OXYGEN SATURATION: 98 % | HEART RATE: 68 BPM | DIASTOLIC BLOOD PRESSURE: 78 MMHG | RESPIRATION RATE: 12 BRPM | SYSTOLIC BLOOD PRESSURE: 100 MMHG | TEMPERATURE: 97.1 F

## 2023-01-21 ENCOUNTER — HOME CARE VISIT (OUTPATIENT)
Dept: HOME HEALTH SERVICES | Facility: HOME HEALTHCARE | Age: 63
End: 2023-01-21

## 2023-01-23 ENCOUNTER — HOME CARE VISIT (OUTPATIENT)
Dept: HOME HEALTH SERVICES | Facility: HOME HEALTHCARE | Age: 63
End: 2023-01-23

## 2023-01-23 VITALS
SYSTOLIC BLOOD PRESSURE: 110 MMHG | RESPIRATION RATE: 16 BRPM | TEMPERATURE: 97.9 F | HEART RATE: 64 BPM | OXYGEN SATURATION: 99 % | DIASTOLIC BLOOD PRESSURE: 72 MMHG

## 2023-01-23 PROCEDURE — 10330064 TAPE, RETENTION 2"X10YDS (1/BX24BX/CS)

## 2023-01-24 VITALS
OXYGEN SATURATION: 98 % | TEMPERATURE: 97.3 F | SYSTOLIC BLOOD PRESSURE: 116 MMHG | DIASTOLIC BLOOD PRESSURE: 70 MMHG | RESPIRATION RATE: 18 BRPM | HEART RATE: 76 BPM

## 2023-01-25 ENCOUNTER — HOME CARE VISIT (OUTPATIENT)
Dept: HOME HEALTH SERVICES | Facility: HOME HEALTHCARE | Age: 63
End: 2023-01-25

## 2023-01-25 VITALS
HEART RATE: 92 BPM | RESPIRATION RATE: 20 BRPM | OXYGEN SATURATION: 100 % | SYSTOLIC BLOOD PRESSURE: 90 MMHG | TEMPERATURE: 97.1 F | DIASTOLIC BLOOD PRESSURE: 60 MMHG

## 2023-01-26 DIAGNOSIS — E11.9 TYPE 2 DIABETES MELLITUS WITHOUT COMPLICATION, WITHOUT LONG-TERM CURRENT USE OF INSULIN (HCC): ICD-10-CM

## 2023-01-26 DIAGNOSIS — M54.50 CHRONIC LOW BACK PAIN WITHOUT SCIATICA, UNSPECIFIED BACK PAIN LATERALITY: ICD-10-CM

## 2023-01-26 DIAGNOSIS — G89.29 CHRONIC LOW BACK PAIN WITHOUT SCIATICA, UNSPECIFIED BACK PAIN LATERALITY: ICD-10-CM

## 2023-01-26 DIAGNOSIS — L89.324 STAGE IV PRESSURE ULCER OF LEFT BUTTOCK (HCC): ICD-10-CM

## 2023-01-26 DIAGNOSIS — K21.9 GASTROESOPHAGEAL REFLUX DISEASE: ICD-10-CM

## 2023-01-27 ENCOUNTER — HOME CARE VISIT (OUTPATIENT)
Dept: HOME HEALTH SERVICES | Facility: HOME HEALTHCARE | Age: 63
End: 2023-01-27

## 2023-01-27 RX ORDER — ASPIRIN 81 MG/1
81 TABLET ORAL DAILY
Qty: 90 TABLET | Refills: 0 | Status: SHIPPED | OUTPATIENT
Start: 2023-01-27

## 2023-01-27 RX ORDER — OXYCODONE HYDROCHLORIDE 20 MG/1
20 TABLET ORAL 3 TIMES DAILY PRN
Qty: 90 TABLET | Refills: 0 | Status: SHIPPED | OUTPATIENT
Start: 2023-01-27

## 2023-01-27 RX ORDER — BLOOD SUGAR DIAGNOSTIC
1 STRIP MISCELLANEOUS DAILY
Qty: 100 EACH | Refills: 0 | Status: SHIPPED | OUTPATIENT
Start: 2023-01-27

## 2023-01-27 RX ORDER — OMEPRAZOLE 40 MG/1
40 CAPSULE, DELAYED RELEASE ORAL DAILY
Qty: 90 CAPSULE | Refills: 0 | Status: SHIPPED | OUTPATIENT
Start: 2023-01-27

## 2023-01-27 RX ORDER — LANCETS
EACH MISCELLANEOUS DAILY
Qty: 100 EACH | Refills: 0 | Status: SHIPPED | OUTPATIENT
Start: 2023-01-27

## 2023-01-27 RX ORDER — IBUPROFEN 800 MG/1
800 TABLET ORAL EVERY 8 HOURS PRN
Qty: 60 TABLET | Refills: 0 | Status: SHIPPED | OUTPATIENT
Start: 2023-01-27

## 2023-01-28 RX ORDER — SODIUM CHLORIDE, SODIUM LACTATE, POTASSIUM CHLORIDE, CALCIUM CHLORIDE 600; 310; 30; 20 MG/100ML; MG/100ML; MG/100ML; MG/100ML
125 INJECTION, SOLUTION INTRAVENOUS CONTINUOUS
Status: CANCELLED | OUTPATIENT
Start: 2023-01-28

## 2023-01-30 ENCOUNTER — ANESTHESIA (OUTPATIENT)
Dept: GASTROENTEROLOGY | Facility: HOSPITAL | Age: 63
End: 2023-01-30

## 2023-01-30 ENCOUNTER — HOME CARE VISIT (OUTPATIENT)
Dept: HOME HEALTH SERVICES | Facility: HOME HEALTHCARE | Age: 63
End: 2023-01-30

## 2023-01-30 ENCOUNTER — ANESTHESIA EVENT (OUTPATIENT)
Dept: GASTROENTEROLOGY | Facility: HOSPITAL | Age: 63
End: 2023-01-30

## 2023-01-30 ENCOUNTER — HOSPITAL ENCOUNTER (OUTPATIENT)
Dept: GASTROENTEROLOGY | Facility: HOSPITAL | Age: 63
Setting detail: OUTPATIENT SURGERY
Discharge: HOME | End: 2023-01-30
Attending: INTERNAL MEDICINE | Admitting: INTERNAL MEDICINE

## 2023-01-30 VITALS
DIASTOLIC BLOOD PRESSURE: 70 MMHG | OXYGEN SATURATION: 97 % | HEART RATE: 87 BPM | RESPIRATION RATE: 18 BRPM | TEMPERATURE: 97.3 F | SYSTOLIC BLOOD PRESSURE: 120 MMHG

## 2023-01-30 VITALS
TEMPERATURE: 97.1 F | DIASTOLIC BLOOD PRESSURE: 75 MMHG | RESPIRATION RATE: 18 BRPM | HEART RATE: 77 BPM | SYSTOLIC BLOOD PRESSURE: 109 MMHG | OXYGEN SATURATION: 97 %

## 2023-01-30 DIAGNOSIS — R19.5 HEME POSITIVE STOOL: ICD-10-CM

## 2023-01-30 RX ORDER — PROPOFOL 10 MG/ML
INJECTION, EMULSION INTRAVENOUS AS NEEDED
Status: DISCONTINUED | OUTPATIENT
Start: 2023-01-30 | End: 2023-01-30

## 2023-01-30 RX ORDER — SODIUM CHLORIDE, SODIUM LACTATE, POTASSIUM CHLORIDE, CALCIUM CHLORIDE 600; 310; 30; 20 MG/100ML; MG/100ML; MG/100ML; MG/100ML
125 INJECTION, SOLUTION INTRAVENOUS CONTINUOUS
Status: DISCONTINUED | OUTPATIENT
Start: 2023-01-30 | End: 2023-02-03 | Stop reason: HOSPADM

## 2023-01-30 RX ORDER — SODIUM CHLORIDE 9 MG/ML
125 INJECTION, SOLUTION INTRAVENOUS CONTINUOUS
Status: DISCONTINUED | OUTPATIENT
Start: 2023-01-30 | End: 2023-02-03 | Stop reason: HOSPADM

## 2023-01-30 RX ADMIN — PROPOFOL 50 MG: 10 INJECTION, EMULSION INTRAVENOUS at 10:50

## 2023-01-30 RX ADMIN — PROPOFOL 50 MG: 10 INJECTION, EMULSION INTRAVENOUS at 10:40

## 2023-01-30 RX ADMIN — PROPOFOL 50 MG: 10 INJECTION, EMULSION INTRAVENOUS at 10:54

## 2023-01-30 RX ADMIN — PROPOFOL 50 MG: 10 INJECTION, EMULSION INTRAVENOUS at 10:43

## 2023-01-30 RX ADMIN — PROPOFOL 20 MG: 10 INJECTION, EMULSION INTRAVENOUS at 10:58

## 2023-01-30 RX ADMIN — SODIUM CHLORIDE, SODIUM LACTATE, POTASSIUM CHLORIDE, AND CALCIUM CHLORIDE 125 ML/HR: .6; .31; .03; .02 INJECTION, SOLUTION INTRAVENOUS at 10:01

## 2023-01-30 NOTE — CASE COMMUNICATION
Spoke with patient last night and confirmed SN visit time between 130pm and 230pm as he has a colonoscopy appt in the morning  Arrived to patient home and one of his friends was at the door and said he is not home and then he called the patient  The patient was on [place] bus to come home, but said it would be another 1 5 hours until he would be home  Sn offered to finish documentation in car and then call and see where he is and the tl ent declined and said we could plan for the next SN visit on Wednesday  Spouse is ind with wound care  Left message notifying  of same  Dr Marcela Tong, this is an FYI as we are orderd to have SN visits 3 x week with this patient and he will only be seen twice this week

## 2023-01-30 NOTE — ANESTHESIA PREPROCEDURE EVALUATION
Procedure:  COLONOSCOPY    Relevant Problems   CARDIO   (+) Benign essential hypertension   (+) 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HCC)      ENDO   (+) Diabetes mellitus type II, controlled (HCC)   (+) Type 2 diabetes mellitus without complication, without long-term current use of insulin (HCC)      GI/HEPATIC   (+) GERD (gastroesophageal reflux disease)   (+) SBO (small bowel obstruction) (HCC)      /RENAL   (+) Renal cyst      HEMATOLOGY   (+) Anemia      NEURO/PSYCH   (+) Anxiety   (+) Continuous opioid dependence (HCC)   (+) History of Clostridium difficile colitis   (+) History of osteomyelitis   (+) Paraplegic spinal paralysis (HCC)      Cardiovascular and Mediastinum   (+) Sepsis with hypotension (HCC)      Musculoskeletal and Integument   (+) Cyst of joint of shoulder      Other   (+) Amputated right leg (HCC)   (+) Colostomy in place ([address] )   (+) Decubitus ulcer of ischium, left, stage IV ([facility])   (+) Diarrhea   (+) Heme positive stool   (+) Left perineal ischial pressure ulcer, stage IV ([facility])   (+) Neurogenic bladder   (+) Open wound of buttock, left, initial encounter   (+) Osteomyelitis of pelvic region (HCC)   (+) Pressure injury of contiguous region involving back and left buttock, stage 4 ([facility])   (+) Sinus tachycardia   (+) 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)   (+) Stage IV pressure ulcer of left heel (HCC)   (+) Stage IV pressure ulcer of right lower back (HCC)   (+) Stage IV pressure ulcer of sacral region ([address] )      Sinus tachycardia  Left atrial abnormality  Nonspecific T wave abnormality  Abnormal ECG     Confirmed by Cecilia Capone ([id number]) on 10/28/2019 11:52:55 AM  Physical Exam    Airway    Mallampati score: II  TM Distance: >3 FB  Neck ROM: full     Dental       Cardiovascular      Pulmonary      Other Findings        Anesthesia Plan  ASA Score- 4     Anesthesia Type- IV sedation with anesthesia with ASA Monitors           Additional Monitors:   Airway Plan:           Plan Factors-Exercise tolerance (METS): <4 METS  Chart reviewed  EKG reviewed  Existing labs reviewed  Patient summary reviewed  Patient is not a current smoker  Patient did not smoke on day of surgery  Induction- intravenous  Postoperative Plan-     Informed Consent- Anesthetic plan and risks discussed with patient  I personally reviewed this patient with the CRNA  Discussed and agreed on the Anesthesia Plan with the CRNA  Makayla Pandya

## 2023-01-30 NOTE — H&P
History and Physical - [facility]  Henrique Alicia Moscat 59 y o  male MRN: [id number]                  HPI: Albert Macdonald is a 59y o  year old male who presents for heme positive stool, s/p colostomy  REVIEW OF SYSTEMS: Per the HPI, and otherwise unremarkable      Historical Information   Past Medical History:   Diagnosis Date   • Anemia    • Blind     r eye   • Chronic cystitis    • Colostomy in place [facility])    • Detrusor sphincter dyssynergia    • Diabetes mellitus ([facility] )     Poorly controlled type 2; Last Assessed:  3/18/14   • Erectile dysfunction    • Frequency of urination    • GERD (gastroesophageal reflux disease)    • History of diabetes mellitus    • History of osteomyelitis    • Hx of leg amputation ([facility])     r high upper leg   • Hyperlipidemia    • Hypertension    • Hypospadias    • Incomplete bladder emptying    • 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 ) 9/16/2019   • Neurogenic bladder    • Paralysis ([facility])    • Paraplegia ([facility])    • Spinal cord cysts    • Ulcer of sacral region [facility])    • Urge incontinence      Past Surgical History:   Procedure Laterality Date   • AMPUTATION      At hip; Last Assessed:  1/19/16   • BLADDER SURGERY     • COLON SURGERY      llq ostomy pouch   • COLOSTOMY     • COMPLEX CYSTOMETROGRAM  2014   • CT CYSTOGRAM  9/19/2019   • CYSTOSCOPY  2014   • IR PICC REPOSITION  9/23/2019   • IR SUPRAPUBIC CATHETER PLACEMENT  9/19/2019   • LEG AMPUTATION     • MEATOTOMY     • PENILE PROSTHESIS  REMOVAL N/A 11/1/2019    Procedure: REMOVAL PROSTHESIS PENILE;  Surgeon: Debra Bach MD;  Location: [facility];  Service: Urology   • PENILE PROSTHESIS IMPLANT  2011   • NC ADJT/REARRGMT SCALP/ARM/LEG 10 1-30 0 SQ CM Left 5/1/2017    Procedure: POSTERIOR THIGH V-Y ADMANCEMENT;  Surgeon: Nikko Diego MD;  Location:  MAIN OR;  Service: Plastics   • NC MUSC MYOCUTANEOUS/FASCIOCUTANEOUS FLAP TRUNK Left 5/1/2017    Procedure: FLAP CLOSURE LEFT ISCHIAL WOUND and "RIGHT" ISCHIAL FLAP ADVANCEMENT * DEBRIDEMENT, VAC PLACEMENT ;  Surgeon: Akbar White MD;  Location: [facility];  Service: Plastics   • [place] MUSC MYOCUTANEOUS/FASCIOCUTANEOUS FLAP TRUNK Left 2017    Procedure: gluteal myocutaneous rotational flap, posterior thigh v to y advancement- wound 5 x 2 5 x 8;  Surgeon: Akbar White MD;  Location: [facility];  Service: Plastics   • SPINE SURGERY      Lower back   • UROFLOWMETRY SIMPLE / COMPLEX       Social History   Social History     Substance and Sexual Activity   Alcohol Use Yes    Comment: Per [employer]:  Social drinker (Onset date:  11/10/17)     Social History     Substance and Sexual Activity   Drug Use No     Social History     Tobacco Use   Smoking Status Former   • Packs/day: 0 50   • Years: 10 00   • Pack years: 5 00   • Types: Cigarettes   • Quit date:    • Years since quittin 1   Smokeless Tobacco Never   Tobacco Comments    Onset date:  11/10/17     Family History   Problem Relation Age of Onset   • No Known Problems Mother    • No Known Problems Father        Meds/Allergies       Current Outpatient Medications:   •  Accu-Chek FastClix Lancets MISC  •  acetaminophen (TYLENOL) 325 mg tablet  •  aspirin (RA Aspirin EC) 81 mg EC tablet  •  Blood Glucose Monitoring Suppl (ONE TOUCH ULTRA 2) w/Device KIT  •  Disposable Gloves (LATEX GLOVES LARGE) MISC  •  ergocalciferol (VITAMIN D2) 50,000 units  •  glucose blood (Accu-Chek Guide) test strip  •  ibuprofen (MOTRIN) 800 mg tablet  •  Incontinence Supply Disposable (SUNBEAM INCONTINENT UNDER PAD) MISC  •  Incontinence Supply Disposable MISC  •  naloxone (NARCAN) 4 mg/0 1 mL nasal spray  •  Nutritional Supplements (Glucerna Shake) LIQD  •  omeprazole (PriLOSEC) 40 MG capsule  •  oxyCODONE (ROXICODONE) 20 MG TABS  •  polyethylene glycol (GOLYTELY) 4000 mL solution  •  sodium hypochlorite (DAKIN'S HALF-STRENGTH) external solution    Current Facility-Administered Medications:   •  lactated ringers infusion, 125 mL/hr, Intravenous, Continuous  •  sodium chloride 0 9 % infusion, 125 mL/hr, Intravenous, Continuous    No Known Allergies    Objective     There were no vitals taken for this visit  PHYSICAL EXAM    Gen: NAD  Head: NCAT  CV: RRR  CHEST: Clear  ABD: soft, NT/ND  EXT: no edema      ASSESSMENT/PLAN:  This is a 59y o  year old male here for colonoscopy, and he is stable and optimized for his procedure

## 2023-01-30 NOTE — ANESTHESIA POSTPROCEDURE EVALUATION
Post-Op Assessment Note    CV Status:  Stable  Pain Score: 0    Pain management: adequate     Mental Status:  Alert   Hydration Status:  Stable   PONV Controlled:  Controlled   Airway Patency:  Patent      Post Op Vitals Reviewed: Yes      Staff: CRNA         No notable events documented      BP   96/52   Temp      Pulse  83   Resp   20   SpO2   98

## 2023-01-30 NOTE — NURSING NOTE
Pt is awake,alert,tolerated diet, seen and instructed by Dr Enid Gregorio, written and verbal instructions given, pt verbalizes an understanding

## 2023-02-01 ENCOUNTER — HOME CARE VISIT (OUTPATIENT)
Dept: HOME HEALTH SERVICES | Facility: HOME HEALTHCARE | Age: 63
End: 2023-02-01

## 2023-02-01 VITALS
SYSTOLIC BLOOD PRESSURE: 112 MMHG | TEMPERATURE: 97.4 F | OXYGEN SATURATION: 96 % | DIASTOLIC BLOOD PRESSURE: 70 MMHG | HEART RATE: 72 BPM | RESPIRATION RATE: 16 BRPM

## 2023-02-01 PROCEDURE — 10330064 SPONGE, GAUZE 8PLY N/S 4"X4" (200/PK 20P

## 2023-02-01 PROCEDURE — 10330064 TAPE, RETENTION 2"X10YDS (1/BX24BX/CS)

## 2023-02-01 PROCEDURE — 10330064 PAD, ABD 5X9" STR LF (1/PK 20PK/BX) MGM1

## 2023-02-03 ENCOUNTER — HOME CARE VISIT (OUTPATIENT)
Dept: HOME HEALTH SERVICES | Facility: HOME HEALTHCARE | Age: 63
End: 2023-02-03

## 2023-02-04 VITALS
DIASTOLIC BLOOD PRESSURE: 70 MMHG | RESPIRATION RATE: 20 BRPM | HEART RATE: 70 BPM | OXYGEN SATURATION: 98 % | SYSTOLIC BLOOD PRESSURE: 110 MMHG | TEMPERATURE: 97.9 F

## 2023-02-06 ENCOUNTER — HOME CARE VISIT (OUTPATIENT)
Dept: HOME HEALTH SERVICES | Facility: HOME HEALTHCARE | Age: 63
End: 2023-02-06

## 2023-02-06 VITALS
TEMPERATURE: 98.4 F | RESPIRATION RATE: 22 BRPM | HEART RATE: 88 BPM | DIASTOLIC BLOOD PRESSURE: 78 MMHG | SYSTOLIC BLOOD PRESSURE: 122 MMHG | OXYGEN SATURATION: 99 %

## 2023-02-06 PROCEDURE — 10330064

## 2023-02-06 PROCEDURE — 10330064 SALINE, IRR SOL STR 100ML (48/CS) MGM37

## 2023-02-06 PROCEDURE — 10330064 SPONGE, GAUZE 8PLY N/S 4"X4" (200/PK 20P

## 2023-02-06 PROCEDURE — 10330064 PAD, ABD 5X9" STR LF (1/PK 20PK/BX) MGM1

## 2023-02-06 PROCEDURE — 10330064 BANDAGE, GAUZE COTTON  6PLY STR 4"X4YDS

## 2023-02-06 PROCEDURE — 10330064 CLEANSER, WND SEA-CLEANS 6OZ  COLPLT

## 2023-02-06 PROCEDURE — 10330064 DRESSING, HYDROCELLULAR ADH STR FOAM 4"X

## 2023-02-06 PROCEDURE — 10330064 TAPE, RETENTION 2"X10YDS (1/BX24BX/CS)

## 2023-02-08 ENCOUNTER — HOME CARE VISIT (OUTPATIENT)
Dept: HOME HEALTH SERVICES | Facility: HOME HEALTHCARE | Age: 63
End: 2023-02-08

## 2023-02-09 VITALS
DIASTOLIC BLOOD PRESSURE: 70 MMHG | HEART RATE: 80 BPM | TEMPERATURE: 97.3 F | RESPIRATION RATE: 20 BRPM | SYSTOLIC BLOOD PRESSURE: 110 MMHG | OXYGEN SATURATION: 98 %

## 2023-02-10 ENCOUNTER — HOME CARE VISIT (OUTPATIENT)
Dept: HOME HEALTH SERVICES | Facility: HOME HEALTHCARE | Age: 63
End: 2023-02-10

## 2023-02-10 VITALS
DIASTOLIC BLOOD PRESSURE: 76 MMHG | TEMPERATURE: 98 F | RESPIRATION RATE: 14 BRPM | HEART RATE: 92 BPM | SYSTOLIC BLOOD PRESSURE: 98 MMHG | OXYGEN SATURATION: 98 %

## 2023-02-11 NOTE — CASE COMMUNICATION
Ship to x Pt   Home  Insurance  ab     Wound 1 x      Full x      Wound 2 x      Full x      Sub for Mepilex:    Silicone Foam with border 4x4 NOT STOCKED [id number]   0

## 2023-02-12 PROCEDURE — 10330064 FOAM, ADH SIL W/BORDER 4"X4" (10/BX 20BX

## 2023-02-13 ENCOUNTER — HOME CARE VISIT (OUTPATIENT)
Dept: HOME HEALTH SERVICES | Facility: HOME HEALTHCARE | Age: 63
End: 2023-02-13

## 2023-02-13 ENCOUNTER — TELEPHONE (OUTPATIENT)
Dept: FAMILY MEDICINE CLINIC | Facility: CLINIC | Age: 63
End: 2023-02-13

## 2023-02-15 ENCOUNTER — HOME CARE VISIT (OUTPATIENT)
Dept: HOME HEALTH SERVICES | Facility: HOME HEALTHCARE | Age: 63
End: 2023-02-15

## 2023-02-15 VITALS
HEART RATE: 76 BPM | DIASTOLIC BLOOD PRESSURE: 68 MMHG | SYSTOLIC BLOOD PRESSURE: 118 MMHG | TEMPERATURE: 97.6 F | OXYGEN SATURATION: 97 % | RESPIRATION RATE: 18 BRPM

## 2023-02-15 VITALS
SYSTOLIC BLOOD PRESSURE: 124 MMHG | OXYGEN SATURATION: 98 % | DIASTOLIC BLOOD PRESSURE: 86 MMHG | TEMPERATURE: 97.9 F | HEART RATE: 100 BPM | RESPIRATION RATE: 20 BRPM

## 2023-02-15 NOTE — TELEPHONE ENCOUNTER
FAXED ON 02/15/23 TO [employer] at [phone]  FAX CONFIRMATION RECEIVED   SCANNED INTO CHART

## 2023-02-16 ENCOUNTER — OFFICE VISIT (OUTPATIENT)
Dept: WOUND CARE | Facility: CLINIC | Age: 63
End: 2023-02-16

## 2023-02-16 VITALS
SYSTOLIC BLOOD PRESSURE: 120 MMHG | DIASTOLIC BLOOD PRESSURE: 80 MMHG | RESPIRATION RATE: 18 BRPM | HEART RATE: 72 BPM | TEMPERATURE: 98 F

## 2023-02-16 DIAGNOSIS — L89.223 STAGE III PRESSURE ULCER OF LEFT HIP (HCC): ICD-10-CM

## 2023-02-16 DIAGNOSIS — L89.134 STAGE IV PRESSURE ULCER OF RIGHT LOWER BACK (HCC): ICD-10-CM

## 2023-02-16 DIAGNOSIS — N18.1 CONTROLLED TYPE 2 DIABETES MELLITUS WITH STAGE 1 CHRONIC KIDNEY DISEASE, UNSPECIFIED WHETHER LONG TERM INSULIN USE (HCC): ICD-10-CM

## 2023-02-16 DIAGNOSIS — E11.22 CONTROLLED TYPE 2 DIABETES MELLITUS WITH STAGE 1 CHRONIC KIDNEY DISEASE, UNSPECIFIED WHETHER LONG TERM INSULIN USE (HCC): ICD-10-CM

## 2023-02-16 DIAGNOSIS — L89.324 LEFT PERINEAL ISCHIAL PRESSURE ULCER, STAGE IV (HCC): ICD-10-CM

## 2023-02-16 DIAGNOSIS — L89.154 STAGE IV PRESSURE ULCER OF SACRAL REGION (HCC): Primary | ICD-10-CM

## 2023-02-16 DIAGNOSIS — G82.20 PARAPLEGIC SPINAL PARALYSIS (HCC): ICD-10-CM

## 2023-02-16 NOTE — PROGRESS NOTES
Patient ID: Tin Ludwig is a 59 y o  male Date of Birth 1960       Chief Complaint   Patient presents with   • Follow Up Wound Care Visit     Right lower back, sacral, and buttock wounds  Allergies:  Patient has no known allergies  Diagnosis:      Diagnosis ICD-10-CM Associated Orders   1  Stage IV pressure ulcer of sacral region [facility])  L89 154 Wound cleansing and dressings      2  Stage IV pressure ulcer of right lower back ([address] )  L89 134       3  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 ([facility])  E49 549 Debridement      4  Left perineal ischial pressure ulcer, stage IV ([facility] [address] )  L89 324       5  Paraplegic spinal paralysis ([facility] [address] )  G82 20       6  Controlled type 2 diabetes mellitus with stage 1 chronic kidney disease, unspecified whether long term insulin use ([facility] )  E11 22     N18 1               Assessment & Plan:  Multiple pressure injuries as listed above  Stage III pressure injury of the right back  It does probe to the rib but it is not exposed  No malodor  Aquacel Ag rope packing  Left hip stage III pressure injury  Surgical debridement  Aquacel Ag rope to cover the base followed by bordered foam   Stage IV pressure injury of the perineum with 2 open areas that communicate  Relatively  clean appearing  Pack with Aquacel Ag rope  Type 2 diabetes, diet controlled  A1c last month was 5 6  A1C results reviewed with the patient today  Paraplegia  Patient follow-up with Dr Brenda Zuniga to discuss possible operative debridement as per last note of August 2022  Subjective:   Mr Sarwat Hayward is a 72-year-old gentleman with paraplegia and history of multiple pressure wounds with 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  He had been rescheduled for a debridement and flap closure by plastics in August but developed a new wound on his right mid to lower back chest wall  This wound probes deep and has been closed on the outside but the tract has not been improving    He had a CT scan that shows a deep tracking to the muscle but no fistulization to the internal thoracic cavity  02/04/2022 he returns now for re-evaluation  He still has had visiting nurses but has not been seen in the [address] [facility] since November  He denies any change or complaint    03/11/2022 no changes since been seen last   No new complaints  04/22/2022 no issues  No changes  05/27/2022  Doing well  Denies fevers or chills  No issues spoke about plastics a re-evaluation but he wants to wait for COVID to wane more and maybe next fall    07/22/2022  He has not been seen here since May mostly due to transportation issues  He has significant more pain and drainage on his right back chest wall wound  Otherwise no changes    08/05/2022 denies fevers or chills  Still in pain on the right side  11/11/22:  Patient returns to the wound center in follow-up of his multiple pressure injuries  He has not been here in a number of months  He has no new complaints  No pain, fever or chills  2/16/2023: Patient returns the wound center for his multiple pressure injuries  Last visit was in November 2022  He has no new complaints  He denies any pain, fever or chills        The following portions of the patient's history were reviewed and updated as appropriate:   Patient Active Problem List   Diagnosis   • Stage IV pressure ulcer of sacral region [facility])   • Stage IV pressure ulcer of left heel ([facility] [address] )   • Cyst of joint of shoulder   • Anemia   • Paraplegic spinal paralysis (HCC)   • Sinus tachycardia   • GERD (gastroesophageal reflux disease)   • History of osteomyelitis   • Anxiety   • Amputated right leg ([facility])   • Benign essential hypertension   • Diabetes mellitus type II, controlled ([facility] [address] )   • Diarrhea   • Decubitus ulcer of ischium, left, stage IV (HCC)   • Continuous opioid dependence ([facility] [address] )   • Colostomy in place [facility])   • Colon cancer screening   • Dyspepsia   • Epigastric pain   • Osteomyelitis of pelvic region [facility])   • 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HCC)   • Neurogenic bladder   • Sepsis with hypotension (HCC)   • History of Clostridium difficile colitis   • Hyperkalemia   • Stage II pressure ulcer of buttock (HCC)   • Left perineal ischial pressure ulcer, stage IV (HCC)   • SBO (small bowel obstruction) (HCC)   • Sepsis (HCC)   • Hypokalemia   • Renal cyst   • Other male erectile dysfunction   • Prostate cancer screening   • Type 2 diabetes mellitus without complication, without long-term current use of insulin (HCC)   • 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)   • Stage IV pressure ulcer of right lower back (HCC)   • Pressure injury of contiguous region involving back and left buttock, stage 4 (HCC)   • Open wound of buttock, left, initial encounter   • Heme positive stool   • Pressure ulcer of left buttock, stage 4 (HCC)     Past Medical History:   Diagnosis Date   • Anemia    • Blind     r eye   • Chronic cystitis    • Colostomy in place [facility])    • Detrusor sphincter dyssynergia    • Diabetes mellitus ([facility] [address] )     Poorly controlled type 2; Last Assessed:  3/18/14   • Erectile dysfunction    • Frequency of urination    • GERD (gastroesophageal reflux disease)    • History of diabetes mellitus    • History of osteomyelitis    • Hx of leg amputation ([facility])     r high upper leg   • Hyperlipidemia    • Hypertension    • Hypospadias    • Incomplete bladder emptying    • 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([facility] ) 9/16/2019   • Neurogenic bladder    • Paralysis ([facility] [address] )    • Paraplegia ([facility])    • Spinal cord cysts    • Ulcer of sacral region [facility])    • Urge incontinence      Past Surgical History:   Procedure Laterality Date   • AMPUTATION      At hip; Last Assessed:  1/19/16   • BLADDER SURGERY     • COLON SURGERY      llq ostomy pouch   • COLOSTOMY     • COMPLEX CYSTOMETROGRAM  2014   • CT CYSTOGRAM  9/19/2019   • CYSTOSCOPY  2014   • IR PICC REPOSITION  9/23/2019   • IR SUPRAPUBIC CATHETER PLACEMENT  9/19/2019   • LEG AMPUTATION     • MEATOTOMY     • PENILE PROSTHESIS  REMOVAL N/A 2019    Procedure: REMOVAL PROSTHESIS PENILE;  Surgeon: Camilla Pulido MD;  Location: [facility];  Service: Urology   • PENILE PROSTHESIS IMPLANT     • UT ADJT/REARRGMT SCALP/ARM/LEG 10 1-30 0 SQ CM Left 2017    Procedure: POSTERIOR THIGH V-Y ADMANCEMENT;  Surgeon: Ruth Stoddard MD;  Location: [facility];  Service: Plastics   • UT MUSC MYOCUTANEOUS/FASCIOCUTANEOUS FLAP TRUNK Left 2017    Procedure: FLAP CLOSURE LEFT ISCHIAL WOUND and "RIGHT" ISCHIAL FLAP ADVANCEMENT * DEBRIDEMENT, [place] ;  Surgeon: Ruth Stoddard MD;  Location: [facility];  Service: Plastics   • UT MUSC MYOCUTANEOUS/FASCIOCUTANEOUS FLAP TRUNK Left 2017    Procedure: gluteal myocutaneous rotational flap, posterior thigh v to y advancement- wound 5 x 2 5 x 8;  Surgeon: Ruth Stoddard MD;  Location: [facility];  Service: Plastics   • SPINE SURGERY      Lower back   • UROFLOWMETRY SIMPLE / COMPLEX       Family History   Problem Relation Age of Onset   • No Known Problems Mother    • No Known Problems Father       Social History     Socioeconomic History   • Marital status: /Civil Union     Spouse name: None   • Number of children: None   • Years of education: None   • Highest education level: None   Occupational History   • None   Tobacco Use   • Smoking status: Former     Packs/day: 0 50     Years: 10 00     Pack years: 5 00     Types: Cigarettes     Quit date:      Years since quittin 1   • Smokeless tobacco: Never   • Tobacco comments:     Onset date:  11/10/17   Vaping Use   • Vaping Use: Never used   Substance and Sexual Activity   • Alcohol use: Yes     Comment: Per [employer]:  Social drinker (Onset date:  11/10/17)   • Drug use: No   • Sexual activity: None   Other Topics Concern   • None   Social History Narrative    [place] language [address] [place] [address] Dr Ontiveros    Social history reviewed, unchanged     Social Determinants of Health     Financial Resource Strain: Not on file Food Insecurity: Not on file   Transportation Needs: Not on file   Physical Activity: Not on file   Stress: Not on file   Social Connections: Not on file   Intimate Partner Violence: Not on file   Housing Stability: Not on file        Current Outpatient Medications:   •  Accu-Chek FastClix Lancets MISC, Inject under the skin daily, Disp: 100 each, Rfl: 0  •  acetaminophen (TYLENOL) 325 mg tablet, Take 2 tablets (650 mg total) by mouth every 6 (six) hours as needed for mild pain, headaches or fever (Patient not taking: Reported on 9/2/2022), Disp: 30 tablet, Rfl: 0  •  aspirin (RA Aspirin EC) 81 mg EC tablet, Take 1 tablet (81 mg total) by mouth daily, Disp: 90 tablet, Rfl: 0  •  Blood Glucose Monitoring Suppl (ONE TOUCH ULTRA 2) w/Device KIT, Use daily, Disp: 100 kit, Rfl: 0  •  Disposable Gloves (LATEX GLOVES LARGE) MISC, Use as needed up to 3 times a day dispo 2 boxes, Disp: 2 each, Rfl: 11  •  ergocalciferol (VITAMIN D2) 50,000 units, Take 1 capsule (50,000 Units total) by mouth once a week (Patient not taking: Reported on 9/2/2022), Disp: 12 capsule, Rfl: 1  •  glucose blood (Accu-Chek Guide) test strip, Use 1 each daily Use as instructed, Disp: 100 each, Rfl: 0  •  ibuprofen (MOTRIN) 800 mg tablet, Take 1 tablet (800 mg total) by mouth every 8 (eight) hours as needed for mild pain, Disp: 60 tablet, Rfl: 0  •  Incontinence Supply Disposable (SUNBEAM INCONTINENT UNDER PAD) MISC, Use 1 per week, dispense 4 reusable underpads, Disp: 4 each, Rfl: 11  •  Incontinence Supply Disposable MISC, by Does not apply route 2 (two) times a day, Disp: 60 each, Rfl: 11  •  naloxone (NARCAN) 4 mg/0 1 mL nasal spray, Administer 1 spray into a nostril  If no response after 2-3 minutes, give another dose in the other nostril using a new spray   (Patient not taking: Reported on 9/2/2022), Disp: 1 each, Rfl: 1  •  Nutritional Supplements (Glucerna Shake) LIQD, Take 3 Cans by mouth 3 (three) times a day (Patient not taking: Reported on 9/2/2022), Disp: 86554 mL, Rfl: 11  •  omeprazole (PriLOSEC) 40 MG capsule, Take 1 capsule (40 mg total) by mouth daily, Disp: 90 capsule, Rfl: 0  •  oxyCODONE (ROXICODONE) 20 MG TABS, Take 1 tablet (20 mg total) by mouth 3 (three) times a day as needed for moderate pain For ongoing pain Max Daily Amount: 60 mg, Disp: 90 tablet, Rfl: 0  •  sodium hypochlorite (DAKIN'S HALF-STRENGTH) external solution, Soak gauze and pack into wounds with each dressing change as directed , Disp: 473 mL, Rfl: 1    Review of Systems   Constitutional: Negative for chills and fever  HENT: Negative for ear pain and sore throat  Eyes: Negative for pain and visual disturbance  Respiratory: Negative for cough and shortness of breath  Cardiovascular: Negative for chest pain and palpitations  Gastrointestinal: Negative for abdominal pain and vomiting  Musculoskeletal: Positive for gait problem (Paraplegia)  Skin: Positive for wound (Sacrum, perineum and hip)  Negative for color change and rash  Neurological: Positive for weakness (Paraplegia) and numbness (Paraplegia)  Psychiatric/Behavioral: Negative for agitation and behavioral problems  All other systems reviewed and are negative  Objective:  /80   Pulse 72   Temp 98 °F (36 7 °C)   Resp 18   Pain Score:   8     Physical Exam  Vitals and nursing note reviewed  Exam conducted with a chaperone present  Constitutional:       Appearance: Normal appearance  Cardiovascular:      Rate and Rhythm: Normal rate  Pulmonary:      Effort: Pulmonary effort is normal    Abdominal:      Comments: Ostomy   Musculoskeletal:      Comments: Paraplegia  Right hip disarticulation and removal   Skin:     General: Skin is warm and dry  Findings: Wound present  No erythema  Comments: See complete wound assessment  Bridge of skin not attached to the base of the perineum  No signs of infection in any of the pressure injuries     Neurological:      Mental Status: He is alert  Comments: Paraplegic   Psychiatric:         Mood and Affect: Mood normal          Behavior: Behavior normal                                       Debridement   Wound 08/26/20 Pressure Injury Hip Left    Universal Protocol:  Consent: Verbal consent obtained  Written consent obtained  Consent given by: patient  Time out: Immediately prior to procedure a "time out" was called to verify the correct patient, procedure, equipment, support staff and site/side marked as required  Patient understanding: patient states understanding of the procedure being performed  Patient identity confirmed: verbally with patient      Performed by: physician  Debridement type: surgical  Level of debridement: subcutaneous tissue  Pain control: lidocaine 4%  Post-debridement measurements  Length (cm): 1  Width (cm): 1 5  Depth (cm): 0 2  Percent debrided: 100%  Surface Area (cm^2): 1 5  Area debrided (cm^2): 1 5  Volume (cm^3): 0 3  Tissue and other material debrided: dermis, epidermis and subcutaneous tissue  Devitalized tissue debrided: fibrin and necrotic debris  Instrument(s) utilized: curette  Bleeding: medium  Hemostasis obtained with: pressure  Procedural pain (0-10): insensate  Post-procedural pain: insensate   Response to treatment: procedure was tolerated well  Debridement Comments: Epiboly of the edges was entirely debrided with a curette  The actual depth was less postdebridement because of reducing the edges  The entire ulceration was debrided  Lab Results   Component Value Date    HGBA1C 5 6 01/10/2023       Wound Instructions:  Orders Placed This Encounter   Procedures   • Wound cleansing and dressings     Wound cleansing and dressings       Wound cleansing and dressings           Wound Left Hip  Cleanse/Irrigate wound with normal saline  Apply a piece of aquacel rope  to wound bed    Cover with allevyn bordered foam to hip wound   Change three times a week or as needed for drainage      R lateral back:  Gently pack with aquacel AG rope  DO NOT SUBSTITUE Tape the tail  Cover with ABD  Secure with tape  Change daily or as needed for drainage      Perineum and Left Buttock Wound:  Skin prep to periwound  Gently pack with aquacel ag rope  DO NOT SUBSTITUE Extend into left buttock and Tape tail  Cover with allevyn life  Change three times a week or as needed for drainage      You need to keep pressure off of right back wound          HOME CARE  Continue home care services/skilled nursing - 3 x per week (family to do in between), daily visits for one week to pack right back wound   Silver nitrate used at visit to perineum wound  Area may appear gray/black for a few days with increased drainage      OFF-LOADING  Avoid pressure at wound site  - all wounds, including right back  Wheelchair Cushion  - ROHO cushion  Do Not Sit for Long Periods of Time  - 1 hr max for meals only, otherwise in bed and turn side to side at least  every 3 hours or more frequently  Reposition in regular bed for now at least every 1-2 hours while awake      Tissue sample sent to lab  Follow up in 4 weeks with Dr Rayna Hernandez treatment note: Cleansed with NSS and dressed as above  Standing Status:   Future     Standing Expiration Date:   2/16/2024   • Debridement     This order was created via procedure documentation             Total time spent today:    Total time (face-to-face and non-face-to-face) spent on today's visit was 18 minutes  This includes preparation for the visits (i e  reviewing test results from date recent hospitalizations, ER/Urgent Care/primary care visits and recent consultant office visits), performance of a medically appropriate history and examination, and orders for medications or testing  Shay Silveira MD, CHT, CWS    Portions of the record may have been created with voice recognition software   Occasional wrong word or "sound alike" substitutions may have occurred due to the inherent limitations of voice recognition software  Read the chart carefully and recognize, using context, where substitutions have occurred

## 2023-02-16 NOTE — PATIENT INSTRUCTIONS
Orders Placed This Encounter   Procedures    Wound cleansing and dressings     Wound cleansing and dressings       Wound cleansing and dressings           Wound Left Hip  Cleanse/Irrigate wound with normal saline  Apply a piece of aquacel rope  to wound bed  Cover with allevyn bordered foam to hip wound   Change three times a week or as needed for drainage  R lateral back:  Gently pack with aquacel AG rope  DO NOT SUBSTITUE Tape the tail  Cover with ABD  Secure with tape  Change daily or as needed for drainage  Perineum and Left Buttock Wound:  Skin prep to periwound  Gently pack with aquacel ag rope  DO NOT SUBSTITUE Extend into left buttock and Tape tail  Cover with allevyn life  Change three times a week or as needed for drainage  You need to keep pressure off of right back wound  HOME CARE  Continue home care services/skilled nursing - 3 x per week (family to do in between), daily visits for one week to pack right back wound   Silver nitrate used at visit to perineum wound  Area may appear gray/black for a few days with increased drainage  OFF-LOADING  Avoid pressure at wound site  - all wounds, including right back  Wheelchair Cushion  - ROHO cushion  Do Not Sit for Long Periods of Time  - 1 hr max for meals only, otherwise in bed and turn side to side at least  every 3 hours or more frequently  Reposition in regular bed for now at least every 1-2 hours while awake  Tissue sample sent to lab  Follow up in 4 weeks with Dr Sp Eckert treatment note: Cleansed with NSS and dressed as above       Standing Status:   Future     Standing Expiration Date:   2/16/2024    Debridement     This order was created via procedure documentation

## 2023-02-17 ENCOUNTER — HOME CARE VISIT (OUTPATIENT)
Dept: HOME HEALTH SERVICES | Facility: HOME HEALTHCARE | Age: 63
End: 2023-02-17

## 2023-02-18 VITALS
DIASTOLIC BLOOD PRESSURE: 60 MMHG | SYSTOLIC BLOOD PRESSURE: 110 MMHG | HEART RATE: 76 BPM | OXYGEN SATURATION: 97 % | RESPIRATION RATE: 18 BRPM | TEMPERATURE: 97.8 F

## 2023-02-20 ENCOUNTER — HOME CARE VISIT (OUTPATIENT)
Dept: HOME HEALTH SERVICES | Facility: HOME HEALTHCARE | Age: 63
End: 2023-02-20

## 2023-02-20 VITALS
OXYGEN SATURATION: 99 % | RESPIRATION RATE: 22 BRPM | DIASTOLIC BLOOD PRESSURE: 68 MMHG | TEMPERATURE: 98 F | HEART RATE: 78 BPM | SYSTOLIC BLOOD PRESSURE: 142 MMHG

## 2023-02-20 PROCEDURE — 10330064 SPONGE, GAUZE 8PLY N/S 4"X4" (200/PK 20P

## 2023-02-20 PROCEDURE — 10330064 DRESSING, ALLEVYN BRDR LT 4X4"(10/BX 6BX

## 2023-02-20 PROCEDURE — 10330064 CLEANSER, WND SEA-CLEANS 6OZ  COLPLT

## 2023-02-20 PROCEDURE — 10330064 SALINE, IRR SOL STR 100ML (48/CS) MGM37

## 2023-02-20 PROCEDURE — 10330064

## 2023-02-20 PROCEDURE — 10330064 WIPE, SKIN GEL PROT DRSNG (50/BX)

## 2023-02-20 PROCEDURE — 10330064 TAPE, RETENTION 2"X10YDS (1/BX24BX/CS)

## 2023-02-20 PROCEDURE — 10330064 FOAM, ADH SIL W/BORDER SACRAL 7"X7" (10/

## 2023-02-20 PROCEDURE — 10330064 PAD, ABD 5X9" STR LF (1/PK 20PK/BX) MGM1

## 2023-02-21 PROCEDURE — 10330064 PAD, ABD 5X9" STR LF (1/PK 20PK/BX) MGM1

## 2023-02-22 ENCOUNTER — HOME CARE VISIT (OUTPATIENT)
Dept: HOME HEALTH SERVICES | Facility: HOME HEALTHCARE | Age: 63
End: 2023-02-22

## 2023-02-24 ENCOUNTER — HOME CARE VISIT (OUTPATIENT)
Dept: HOME HEALTH SERVICES | Facility: HOME HEALTHCARE | Age: 63
End: 2023-02-24

## 2023-02-24 DIAGNOSIS — G89.29 CHRONIC LOW BACK PAIN WITHOUT SCIATICA, UNSPECIFIED BACK PAIN LATERALITY: ICD-10-CM

## 2023-02-24 DIAGNOSIS — L89.324 STAGE IV PRESSURE ULCER OF LEFT BUTTOCK (HCC): ICD-10-CM

## 2023-02-24 DIAGNOSIS — M54.50 CHRONIC LOW BACK PAIN WITHOUT SCIATICA, UNSPECIFIED BACK PAIN LATERALITY: ICD-10-CM

## 2023-02-24 RX ORDER — OXYCODONE HYDROCHLORIDE 20 MG/1
20 TABLET ORAL 3 TIMES DAILY PRN
Qty: 90 TABLET | Refills: 0 | Status: SHIPPED | OUTPATIENT
Start: 2023-02-24

## 2023-02-27 ENCOUNTER — HOME CARE VISIT (OUTPATIENT)
Dept: HOME HEALTH SERVICES | Facility: HOME HEALTHCARE | Age: 63
End: 2023-02-27

## 2023-02-27 ENCOUNTER — TELEPHONE (OUTPATIENT)
Dept: WOUND CARE | Facility: CLINIC | Age: 63
End: 2023-02-27

## 2023-02-27 VITALS
OXYGEN SATURATION: 99 % | DIASTOLIC BLOOD PRESSURE: 78 MMHG | RESPIRATION RATE: 20 BRPM | TEMPERATURE: 97.8 F | HEART RATE: 92 BPM | SYSTOLIC BLOOD PRESSURE: 106 MMHG

## 2023-02-27 VITALS
OXYGEN SATURATION: 97 % | DIASTOLIC BLOOD PRESSURE: 60 MMHG | HEART RATE: 76 BPM | RESPIRATION RATE: 18 BRPM | TEMPERATURE: 97.4 F | SYSTOLIC BLOOD PRESSURE: 110 MMHG

## 2023-02-27 NOTE — TELEPHONE ENCOUNTER
Received call from [address] [facility] in regards to patient's drainage  Drainage has been blue-green in color  NP made aware recommending 5 minute Dakin's soak at dressing changes prior to applying new dressing  Nurse states patient has Dakins at home  No further questions/concerns

## 2023-02-28 PROCEDURE — 10330064

## 2023-02-28 NOTE — CASE COMMUNICATION
To patient home  [facility]    Aquacel AG rope____30    Darin ostomy pouches REF [id number]    2

## 2023-03-01 ENCOUNTER — HOME CARE VISIT (OUTPATIENT)
Dept: HOME HEALTH SERVICES | Facility: HOME HEALTHCARE | Age: 63
End: 2023-03-01

## 2023-03-03 ENCOUNTER — HOME CARE VISIT (OUTPATIENT)
Dept: HOME HEALTH SERVICES | Facility: HOME HEALTHCARE | Age: 63
End: 2023-03-03

## 2023-03-03 VITALS
HEART RATE: 76 BPM | SYSTOLIC BLOOD PRESSURE: 118 MMHG | DIASTOLIC BLOOD PRESSURE: 70 MMHG | RESPIRATION RATE: 18 BRPM | TEMPERATURE: 97.8 F | OXYGEN SATURATION: 97 %

## 2023-03-04 VITALS
HEART RATE: 77 BPM | SYSTOLIC BLOOD PRESSURE: 118 MMHG | RESPIRATION RATE: 18 BRPM | TEMPERATURE: 98.2 F | OXYGEN SATURATION: 98 % | DIASTOLIC BLOOD PRESSURE: 70 MMHG

## 2023-03-06 ENCOUNTER — HOME CARE VISIT (OUTPATIENT)
Dept: HOME HEALTH SERVICES | Facility: HOME HEALTHCARE | Age: 63
End: 2023-03-06

## 2023-03-06 VITALS
TEMPERATURE: 97.6 F | DIASTOLIC BLOOD PRESSURE: 70 MMHG | OXYGEN SATURATION: 98 % | HEART RATE: 76 BPM | RESPIRATION RATE: 16 BRPM | SYSTOLIC BLOOD PRESSURE: 106 MMHG

## 2023-03-06 DIAGNOSIS — T83.511D URINARY TRACT INFECTION ASSOCIATED WITH INDWELLING URETHRAL CATHETER, SUBSEQUENT ENCOUNTER: Primary | ICD-10-CM

## 2023-03-06 DIAGNOSIS — N39.0 URINARY TRACT INFECTION ASSOCIATED WITH INDWELLING URETHRAL CATHETER, SUBSEQUENT ENCOUNTER: Primary | ICD-10-CM

## 2023-03-06 PROCEDURE — 10330064

## 2023-03-06 RX ORDER — AMOXICILLIN AND CLAVULANATE POTASSIUM 875; 125 MG/1; MG/1
1 TABLET, FILM COATED ORAL EVERY 12 HOURS SCHEDULED
Qty: 20 TABLET | Refills: 0 | Status: SHIPPED | OUTPATIENT
Start: 2023-03-06 | End: 2023-03-16

## 2023-03-07 ENCOUNTER — TELEPHONE (OUTPATIENT)
Dept: UROLOGY | Facility: CLINIC | Age: 63
End: 2023-03-07

## 2023-03-07 ENCOUNTER — HOME CARE VISIT (OUTPATIENT)
Dept: HOME HEALTH SERVICES | Facility: HOME HEALTHCARE | Age: 63
End: 2023-03-07

## 2023-03-07 PROCEDURE — 10330064 SPONGE, GAUZE 8PLY N/S 4"X4" (200/PK 20P

## 2023-03-07 PROCEDURE — 10330064 SALINE, IRR SOL STR 100ML (48/CS) MGM37

## 2023-03-07 PROCEDURE — 10330064 TAPE, RETENTION 2"X10YDS (1/BX24BX/CS)

## 2023-03-07 PROCEDURE — 10330064 PAD, ABD 5X9" STR LF (1/PK 20PK/BX) MGM1

## 2023-03-07 NOTE — TELEPHONE ENCOUNTER
----- Message from Nura Mccabe MD sent at 3/7/2023  9:42 AM EST -----  Please have him set up for a virtual visit with me in the near future to discuss his PSA  Thank you

## 2023-03-07 NOTE — CASE COMMUNICATION
Ship to  Pt  Home  Insurance  AB     Wound 1 X      Full X       Wound 2 X      Full X       Saline 100ml [id number](Not covered by Private Ins)    Gauze 4x4 N/S 200 4x4s per unit  [id number] 2     ABD 5x9 [id number] 20    Tape   Mefix 2iformerly [facility] [id number] 2

## 2023-03-08 ENCOUNTER — HOME CARE VISIT (OUTPATIENT)
Dept: HOME HEALTH SERVICES | Facility: HOME HEALTHCARE | Age: 63
End: 2023-03-08

## 2023-03-10 ENCOUNTER — HOME CARE VISIT (OUTPATIENT)
Dept: HOME HEALTH SERVICES | Facility: HOME HEALTHCARE | Age: 63
End: 2023-03-10

## 2023-03-11 VITALS
OXYGEN SATURATION: 96 % | HEART RATE: 78 BPM | DIASTOLIC BLOOD PRESSURE: 60 MMHG | RESPIRATION RATE: 18 BRPM | SYSTOLIC BLOOD PRESSURE: 110 MMHG | TEMPERATURE: 97.2 F

## 2023-03-13 ENCOUNTER — HOME CARE VISIT (OUTPATIENT)
Dept: HOME HEALTH SERVICES | Facility: HOME HEALTHCARE | Age: 63
End: 2023-03-13

## 2023-03-13 VITALS
RESPIRATION RATE: 24 BRPM | HEART RATE: 88 BPM | DIASTOLIC BLOOD PRESSURE: 70 MMHG | SYSTOLIC BLOOD PRESSURE: 120 MMHG | OXYGEN SATURATION: 96 % | TEMPERATURE: 98 F

## 2023-03-13 PROCEDURE — 10330064 TAPE, RETENTION 2"X10YDS (1/BX24BX/CS)

## 2023-03-13 PROCEDURE — 10330064 PAD, ABD 5X9" STR LF (1/PK 20PK/BX) MGM1

## 2023-03-13 PROCEDURE — 10330064 FOAM, ADH SIL W/BORDER SACRAL 7"X7" (10/

## 2023-03-13 PROCEDURE — 10330064 SPONGE, GAUZE 8PLY N/S 4"X4" (200/PK 20P

## 2023-03-13 PROCEDURE — 10330064 DRESSING, HYDROCELLULAR ADH STR FOAM 4"X

## 2023-03-13 PROCEDURE — 10330064 SALINE, IRR SOL STR 100ML (48/CS) MGM37

## 2023-03-15 ENCOUNTER — HOME CARE VISIT (OUTPATIENT)
Dept: HOME HEALTH SERVICES | Facility: HOME HEALTHCARE | Age: 63
End: 2023-03-15

## 2023-03-17 ENCOUNTER — OFFICE VISIT (OUTPATIENT)
Dept: WOUND CARE | Facility: CLINIC | Age: 63
End: 2023-03-17

## 2023-03-17 ENCOUNTER — HOME CARE VISIT (OUTPATIENT)
Dept: HOME HEALTH SERVICES | Facility: HOME HEALTHCARE | Age: 63
End: 2023-03-17

## 2023-03-17 VITALS
SYSTOLIC BLOOD PRESSURE: 100 MMHG | DIASTOLIC BLOOD PRESSURE: 68 MMHG | TEMPERATURE: 97.5 F | HEART RATE: 68 BPM | RESPIRATION RATE: 16 BRPM

## 2023-03-17 DIAGNOSIS — L89.223 STAGE III PRESSURE ULCER OF LEFT HIP (HCC): ICD-10-CM

## 2023-03-17 DIAGNOSIS — L89.154 STAGE IV PRESSURE ULCER OF SACRAL REGION (HCC): Primary | ICD-10-CM

## 2023-03-17 DIAGNOSIS — L89.134 STAGE IV PRESSURE ULCER OF RIGHT LOWER BACK (HCC): ICD-10-CM

## 2023-03-17 DIAGNOSIS — L89.324 LEFT PERINEAL ISCHIAL PRESSURE ULCER, STAGE IV (HCC): ICD-10-CM

## 2023-03-17 NOTE — PATIENT INSTRUCTIONS
Orders Placed This Encounter   Procedures    Wound cleansing and dressings     Wounds Left Hip  Cleanse/Irrigate wound with normal saline  Apply a piece of aquacel rope to wound bed  Cover with allevyn bordered foam to hip wound   Change three times a week or as needed for drainage  R lateral back:  Gently pack with aquacel AG rope  DO NOT SUBSTITUE Tape the tail  Cover with ABD  Secure with tape  Change daily or as needed for drainage  Perineum and Left Buttock Wound:  Skin prep to periwound  Gently pack with aquacel ag rope  DO NOT SUBSTITUE Extend into left buttock and Tape tail  Cover with allevyn life  Change three times a week or as needed for drainage  You need to keep pressure off of right back wound  HOME CARE  Continue home care services/skilled nursing - 3 x per week (family to do in between), daily visits for one week to pack right back wound   Silver nitrate used at visit to perineum wound  Area may appear gray/black for a few days with increased drainage  OFF-LOADING  Avoid pressure at wound site  - all wounds, including right back  Wheelchair Cushion  - ROHO cushion  Do Not Sit for Long Periods of Time  - 1 hr max for meals only, otherwise in bed and turn side to side at least  every 3 hours or more frequently  Reposition in regular bed for now at least every 1-2 hours while awake  Tissue sample sent to lab  Follow up in 4 weeks with Dr Maria Teresa Fine treatment note: Cleansed with NSS and dressed as above       Standing Status:   Future     Standing Expiration Date:   3/17/2024

## 2023-03-19 VITALS
DIASTOLIC BLOOD PRESSURE: 68 MMHG | HEART RATE: 72 BPM | TEMPERATURE: 97.8 F | OXYGEN SATURATION: 97 % | RESPIRATION RATE: 18 BRPM | SYSTOLIC BLOOD PRESSURE: 118 MMHG

## 2023-03-20 ENCOUNTER — HOME CARE VISIT (OUTPATIENT)
Dept: HOME HEALTH SERVICES | Facility: HOME HEALTHCARE | Age: 63
End: 2023-03-20

## 2023-03-21 ENCOUNTER — HOME CARE VISIT (OUTPATIENT)
Dept: HOME HEALTH SERVICES | Facility: HOME HEALTHCARE | Age: 63
End: 2023-03-21

## 2023-03-22 ENCOUNTER — HOME CARE VISIT (OUTPATIENT)
Dept: HOME HEALTH SERVICES | Facility: HOME HEALTHCARE | Age: 63
End: 2023-03-22

## 2023-03-22 VITALS
HEART RATE: 72 BPM | SYSTOLIC BLOOD PRESSURE: 110 MMHG | DIASTOLIC BLOOD PRESSURE: 70 MMHG | RESPIRATION RATE: 20 BRPM | TEMPERATURE: 98.1 F | OXYGEN SATURATION: 98 %

## 2023-03-24 ENCOUNTER — HOME CARE VISIT (OUTPATIENT)
Dept: HOME HEALTH SERVICES | Facility: HOME HEALTHCARE | Age: 63
End: 2023-03-24

## 2023-03-24 DIAGNOSIS — G89.29 CHRONIC LOW BACK PAIN WITHOUT SCIATICA, UNSPECIFIED BACK PAIN LATERALITY: ICD-10-CM

## 2023-03-24 DIAGNOSIS — M54.50 CHRONIC LOW BACK PAIN WITHOUT SCIATICA, UNSPECIFIED BACK PAIN LATERALITY: ICD-10-CM

## 2023-03-24 DIAGNOSIS — L89.324 STAGE IV PRESSURE ULCER OF LEFT BUTTOCK (HCC): ICD-10-CM

## 2023-03-24 RX ORDER — OXYCODONE HYDROCHLORIDE 20 MG/1
20 TABLET ORAL 3 TIMES DAILY PRN
Qty: 90 TABLET | Refills: 0 | Status: SHIPPED | OUTPATIENT
Start: 2023-03-24

## 2023-03-24 NOTE — PROGRESS NOTES
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)  There is a new small area just beside the old hip pressure ulcer  Diagnoses and all orders for this visit:    Stage IV pressure ulcer of sacral region ([address] )  -     Wound cleansing and dressings; Future    Stage IV pressure ulcer of right lower back (HCC)  -     Wound cleansing and dressings; Future    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)  -     Wound cleansing and dressings; Future    Left perineal ischial pressure ulcer, stage IV (HCC)  -     Wound cleansing and dressings; Future    Other orders  -     Debridement  -     Debridement  -     Debridement              Debridement   Wound 08/26/20 Pressure Injury Buttocks Left    Universal Protocol:  Consent given by: patient  Time out: Immediately prior to procedure a "time out" was called to verify the correct patient, procedure, equipment, support staff and site/side marked as required      Performed by: physician  Debridement type: surgical  Level of debridement: subcutaneous tissue  Pain control: lidocaine 4%  Post-debridement measurements  Length (cm): 4  Width (cm): 2 6  Depth (cm): 1 8  Percent debrided: 100%  Surface Area (cm^2): 10 4  Area debrided (cm^2): 10 4  Volume (cm^3): 18 72  Tissue and other material debrided: subcutaneous tissue  Devitalized tissue debrided: biofilm and slough  Instrument(s) utilized: curette  Procedural pain (0-10): insensate  Post-procedural pain: insensate   Response to treatment: procedure was tolerated well    Debridement   Wound 11/05/21 Pressure Injury Perineum    Universal Protocol:  Consent given by: patient  Time out: Immediately prior to procedure a "time out" was called to verify the correct patient, procedure, equipment, support staff and site/side marked as required  Performed by: physician  Debridement type: surgical  Level of debridement: subcutaneous tissue  Pain control: lidocaine 4%  Post-debridement measurements  Length (cm): 1 4  Width (cm): 0 9  Depth (cm): 1 7  Percent debrided: 100%  Surface Area (cm^2): 1 26  Area debrided (cm^2): 1 26  Volume (cm^3): 2 14  Tissue and other material debrided: subcutaneous tissue  Devitalized tissue debrided: biofilm and slough  Instrument(s) utilized: curette  Procedural pain (0-10): insensate  Post-procedural pain: insensate   Response to treatment: procedure was tolerated well    Debridement   Wound 07/29/21 Pressure Injury Back Right; Lower; Lateral    Universal Protocol:  Consent given by: patient  Time out: Immediately prior to procedure a "time out" was called to verify the correct patient, procedure, equipment, support staff and site/side marked as required      Performed by: physician  Debridement type: surgical  Level of debridement: subcutaneous tissue  Pain control: lidocaine 4%  Post-debridement measurements  Length (cm): 1 5  Width (cm): 0 5  Depth (cm): 2 5  Percent debrided: 100%  Surface Area (cm^2): 0 75  Area debrided (cm^2): 0 75  Volume (cm^3): 1 88  Tissue and other material debrided: subcutaneous tissue  Devitalized tissue debrided: biofilm and slough  Instrument(s) utilized: curette  Procedural pain (0-10): insensate  Post-procedural pain: insensate   Response to treatment: procedure was tolerated well          Plan:     Wound 08/26/20 Pressure Injury Buttocks Left (Active)   Wound Image Images linked 03/17/23 1118   Wound Description [place] 03/17/23 1044   Shelby-wound Assessment Scar Tissue; Maceration 03/17/23 1044   Wound Length (cm) 4 cm 03/17/23 1044   Wound Width (cm) 2 6 cm 03/17/23 1044   Wound Depth (cm) 1 8 cm 03/17/23 1044   Wound Surface Area (cm^2) 10 4 cm^2 03/17/23 1044   Wound Volume (cm^3) 18 72 cm^3 03/17/23 1044   Calculated Wound Volume (cm^3) 18 72 cm^3 03/17/23 1044   Change in Wound Size % 35 03/17/23 1044   Tunneling in depth located at 11 oclock 4 4cm, 3 oclock tunnel communicates with perineum wound  03/17/23 1044   Undermining 5 03/17/23 1044   Undermining is depth extending from 1-11 oclock 03/17/23 1044   Drainage Amount Large 03/17/23 1044   Drainage Description Yellow; Tan 03/17/23 1044   Patient Tolerance Tolerated well 03/17/23 1044   Dressing Status Removed 03/17/23 1044       Wound 08/26/20 Pressure Injury Hip Left (Active)   Wound Image Images linked 03/17/23 1038   Wound Description Epithelialization;Pink 03/17/23 1051   Shelby-wound Assessment Scar Tissue; Intact 03/17/23 1051   Wound Length (cm) 0 9 cm 03/17/23 1051   Wound Width (cm) 1 cm 03/17/23 1051   Wound Depth (cm) 0 2 cm 03/17/23 1051   Wound Surface Area (cm^2) 0 9 cm^2 03/17/23 1051   Wound Volume (cm^3) 0 18 cm^3 03/17/23 1051   Calculated Wound Volume (cm^3) 0 18 cm^3 03/17/23 1051   Change in Wound Size % 21 74 03/17/23 1051   Undermining 0 2 03/17/23 1051   Undermining is depth extending from 6-1 03/17/23 1051   Drainage Amount Moderate 03/17/23 1051   Drainage Description Yellow; Tan 03/17/23 1051   Patient Tolerance Tolerated well 03/17/23 1051   Dressing Status Removed 03/17/23 1051       Wound 07/29/21 Pressure Injury Back Right; Lower; Lateral (Active)   Wound Image Images linked 03/17/23 1117   Wound Description [facility] 03/17/23 1041   Shelby-wound Assessment Scar Tissue; Intact 03/17/23 1041   Wound Length (cm) 1 5 cm 03/17/23 1041   Wound Width (cm) 2 5 cm 03/17/23 1041   Wound Depth (cm) 2 cm 03/17/23 1041   Wound Surface Area (cm^2) 3 75 cm^2 03/17/23 1041   Wound Volume (cm^3) 7 5 cm^3 03/17/23 1041   Calculated Wound Volume (cm^3) 7 5 cm^3 03/17/23 1041   Change in Wound Size % 56 8 03/17/23 1041   Tunneling 4 2 cm 03/17/23 1041   Undermining 2 3 03/17/23 1041   Undermining is depth extending from 8-3 03/17/23 1041   Drainage Amount Moderate 03/17/23 1041   Drainage Description Serous;Tan;Yellow 03/17/23 1041   Patient Tolerance Tolerated well 03/17/23 1041   Dressing Status Removed 03/17/23 1041       Wound 11/05/21 Pressure Injury Perineum (Active)   Wound Image Images linked 03/17/23 1118   Wound Length (cm) 1 4 cm 03/17/23 1048   Wound Width (cm) 0 9 cm 03/17/23 1048   Wound Depth (cm) 1 7 cm 03/17/23 1048   Wound Surface Area (cm^2) 1 26 cm^2 03/17/23 1048   Wound Volume (cm^3) 2 142 cm^3 03/17/23 1048   Calculated Wound Volume (cm^3) 2 14 cm^3 03/17/23 1048   Change in Wound Size % -2040 03/17/23 1048   Tunneling in depth located at 9 o'clock communicates with left buttock 03/17/23 1048   Undermining 4 1 03/17/23 1048   Undermining is depth extending from 1-10 03/17/23 1048   Drainage Amount Large 03/17/23 1048   Drainage Description Yellow; Tan 03/17/23 1048   Patient Tolerance Tolerated well 03/17/23 1048   Dressing Status Removed 03/17/23 1048       Wound 03/17/23 Pressure Injury Hip Left;Proximal (Active)   Wound Image Images linked 03/17/23 1040   Wound Description Pink;Epithelialization 03/17/23 1052   Shelby-wound Assessment Scar Tissue; Intact 03/17/23 1052   Wound Length (cm) 1 cm 03/17/23 1052   Wound Width (cm) 1 5 cm 03/17/23 1052   Wound Depth (cm) 0 1 cm 03/17/23 1052   Wound Surface Area (cm^2) 1 5 cm^2 03/17/23 1052   Wound Volume (cm^3) 0 15 cm^3 03/17/23 1052   Calculated Wound Volume (cm^3) 0 15 cm^3 03/17/23 1052   Drainage Amount Small 03/17/23 1052   Drainage Description Yellow 03/17/23 1052   Patient Tolerance Tolerated well 03/17/23 1052   Dressing Status Removed 03/17/23 1052       Wound 08/26/20 Pressure Injury Buttocks Left (Active) Date First Assessed/Time First Assessed: 08/26/20 1056   Primary Wound Type: Pressure Injury  Location: Buttocks  Wound Location Orientation: Left  Wound Outcome: Palliative       Wound 08/26/20 Pressure Injury Hip Left (Active)   Date First Assessed/Time First Assessed: 08/26/20 1057   Primary Wound Type: Pressure Injury  Location: Hip  Wound Location Orientation: Left  Wound Outcome: Palliative       Wound 07/29/21 Pressure Injury Back Right; Lower; Lateral (Active)   Date First Assessed: 07/29/21   Primary Wound Type: Pressure Injury  Location: Back  Wound Location Orientation: Right; Lower; Lateral  Wound Outcome: Palliative       Wound 11/05/21 Pressure Injury Perineum (Active)   Date First Assessed/Time First Assessed: 11/05/21 1059   Primary Wound Type: Pressure Injury  Location: Perineum  Wound Outcome: Palliative       Wound 03/17/23 Pressure Injury Hip Left;Proximal (Active)   Date First Assessed/Time First Assessed: 03/17/23 1039   Primary Wound Type: Pressure Injury  Location: Hip  Wound Location Orientation: Left;Proximal       [REMOVED] Wound 08/26/19 Buttocks Left;Distal;Lateral (Removed)   Resolved Date/Resolved Time: 08/26/20 1056  Date First Assessed/Time First Assessed: 08/26/19 1130   Pre-Existing Wound: Yes  Location: Buttocks  Wound Location Orientation: Left;Distal;Lateral  Wound Description (Comments): Deep ischial wound       [REMOVED] Wound 09/16/19 Buttocks Right;Distal (Removed)   Resolved Date/Resolved Time: 08/26/20 1055  Date First Assessed/Time First Assessed: 09/16/19 1657   Pre-Existing Wound: Yes  Location: Buttocks  Wound Location Orientation: Right;Distal       [REMOVED] Wound 10/28/19 Knee Left (Removed)   Resolved Date/Resolved Time: 08/26/20 1055  Date First Assessed/Time First Assessed: 10/28/19 0657   Pre-Existing Wound: Yes  Location: Knee  Wound Location Orientation: Left  Wound Description (Comments): round black  Dressing Status: Open to air       [REMOVED] Wound 10/28/19 Buttocks Left;Mid (Removed)   Resolved Date/Resolved Time: 08/26/20 1056  Date First Assessed/Time First Assessed: 10/28/19 1108   Pre-Existing Wound: Yes  Location: Buttocks  Wound Location Orientation: Left;Mid  Wound Description (Comments): Stage 2 vs traumatic injury       [REMOVED] Wound 11/01/19 Other (Comment) N/A (Removed)   Resolved Date/Resolved Time: 08/26/20 1055  Date First Assessed/Time First Assessed: 11/01/19 1640   Location: Other (Comment)  Wound Location Orientation: N/A  Wound Description (Comments): scrotum dressed with exofin       [REMOVED] Wound 05/27/22 Skin Tear Buttocks Left;Proximal (Removed)   Resolved Date/Resolved Time: 08/05/22 0851  Date First Assessed/Time First Assessed: 05/27/22 0946   Primary Wound Type: Skin Tear  Location: Buttocks  Wound Location Orientation: Left;Proximal  Wound Outcome: Healed       Subjective:        Mr Marianne Davis is a 52-year-old gentleman with paraplegia and history of multiple pressure wounds with 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  He had been rescheduled for a debridement and flap closure by plastics in August but developed a new wound on his right mid to lower back chest wall  This wound probes deep and has been closed on the outside but the tract has not been improving  He had a CT scan that shows a deep tracking to the muscle but no fistulization to the internal thoracic cavity  02/04/2022 he returns now for re-evaluation  He still has had visiting nurses but has not been seen in the [address] [facility] since November  He denies any change or complaint    03/11/2022 no changes since been seen last   No new complaints  04/22/2022 no issues  No changes  05/27/2022  Doing well  Denies fevers or chills  No issues spoke about plastics a re-evaluation but he wants to wait for COVID to wane more and maybe next fall 07/22/2022  He has not been seen here since May mostly due to transportation issues    He has significant more pain and drainage on his right back chest wall wound  Otherwise no changes    08/05/2022 denies fevers or chills  Still in pain on the right side  3/17/2023 he returns for evaluation  He was seen in the wound center by another provider month or 2 ago  The following portions of the patient's history were reviewed and updated as appropriate: allergies, current medications, past family history, past medical history, past social history, past surgical history and problem list     Review of Systems   Constitutional: Negative for chills and fever  HENT: Negative for ear pain and sore throat  Eyes: Negative for pain and visual disturbance  Respiratory: Negative for cough and shortness of breath  Cardiovascular: Negative for chest pain and palpitations  Gastrointestinal: Negative for abdominal pain and vomiting  Skin: Negative for color change and rash  Neurological: Positive for weakness and numbness  Psychiatric/Behavioral: Negative for agitation and behavioral problems  All other systems reviewed and are negative  Objective:       Wound 08/26/20 Pressure Injury Buttocks Left (Active)   Wound Image Images linked 03/17/23 1118   Wound Description Pink 03/17/23 1044   Shelby-wound Assessment Scar Tissue; Maceration 03/17/23 1044   Wound Length (cm) 4 cm 03/17/23 1044   Wound Width (cm) 2 6 cm 03/17/23 1044   Wound Depth (cm) 1 8 cm 03/17/23 1044   Wound Surface Area (cm^2) 10 4 cm^2 03/17/23 1044   Wound Volume (cm^3) 18 72 cm^3 03/17/23 1044   Calculated Wound Volume (cm^3) 18 72 cm^3 03/17/23 1044   Change in Wound Size % 35 03/17/23 1044   Tunneling in depth located at 11 oclock 4 4cm, 3 oclock tunnel communicates with perineum wound  03/17/23 1044   Undermining 5 03/17/23 1044   Undermining is depth extending from 1-11 oclock 03/17/23 1044   Drainage Amount Large 03/17/23 1044   Drainage Description Yellow; Tan 03/17/23 1044   Patient Tolerance Tolerated well 03/17/23 1044   Dressing Status Removed 03/17/23 1044       Wound 08/26/20 Pressure Injury Hip Left (Active)   Wound Image Images linked 03/17/23 1038   Wound Description Epithelialization;Pink 03/17/23 1051   Shelby-wound Assessment Scar Tissue; Intact 03/17/23 1051   Wound Length (cm) 0 9 cm 03/17/23 1051   Wound Width (cm) 1 cm 03/17/23 1051   Wound Depth (cm) 0 2 cm 03/17/23 1051   Wound Surface Area (cm^2) 0 9 cm^2 03/17/23 1051   Wound Volume (cm^3) 0 18 cm^3 03/17/23 1051   Calculated Wound Volume (cm^3) 0 18 cm^3 03/17/23 1051   Change in Wound Size % 21 74 03/17/23 1051   Undermining 0 2 03/17/23 1051   Undermining is depth extending from 6-1 03/17/23 1051   Drainage Amount Moderate 03/17/23 1051   Drainage Description Yellow; Tan 03/17/23 1051   Patient Tolerance Tolerated well 03/17/23 1051   Dressing Status Removed 03/17/23 1051       Wound 07/29/21 Pressure Injury Back Right; Lower; Lateral (Active)   Wound Image Images linked 03/17/23 1117   Wound Description [facility] 03/17/23 1041   Shelby-wound Assessment Scar Tissue; Intact 03/17/23 1041   Wound Length (cm) 1 5 cm 03/17/23 1041   Wound Width (cm) 2 5 cm 03/17/23 1041   Wound Depth (cm) 2 cm 03/17/23 1041   Wound Surface Area (cm^2) 3 75 cm^2 03/17/23 1041   Wound Volume (cm^3) 7 5 cm^3 03/17/23 1041   Calculated Wound Volume (cm^3) 7 5 cm^3 03/17/23 1041   Change in Wound Size % 56 8 03/17/23 1041   Tunneling 4 2 cm 03/17/23 1041   Undermining 2 3 03/17/23 1041   Undermining is depth extending from 8-3 03/17/23 1041   Drainage Amount Moderate 03/17/23 1041   Drainage Description Serous;Tan;Yellow 03/17/23 1041   Patient Tolerance Tolerated well 03/17/23 1041   Dressing Status Removed 03/17/23 1041       Wound 11/05/21 Pressure Injury Perineum (Active)   Wound Image Images linked 03/17/23 1118   Wound Length (cm) 1 4 cm 03/17/23 1048   Wound Width (cm) 0 9 cm 03/17/23 1048   Wound Depth (cm) 1 7 cm 03/17/23 1048   Wound Surface Area (cm^2) 1 26 cm^2 03/17/23 1048   Wound Volume (cm^3) 2 142 cm^3 03/17/23 1048   Calculated Wound Volume (cm^3) 2 14 cm^3 03/17/23 1048   Change in Wound Size % -2040 03/17/23 1048   Tunneling in depth located at 9 o'clock communicates with left buttock 03/17/23 1048   Undermining 4 1 03/17/23 1048   Undermining is depth extending from 1-10 03/17/23 1048   Drainage Amount Large 03/17/23 1048   Drainage Description Yellow; Tan 03/17/23 1048   Patient Tolerance Tolerated well 03/17/23 1048   Dressing Status Removed 03/17/23 1048       Wound 03/17/23 Pressure Injury Hip Left;Proximal (Active)   Wound Image Images linked 03/17/23 1040   Wound Description Pink;Epithelialization 03/17/23 1052   Shelby-wound Assessment Scar Tissue; Intact 03/17/23 1052   Wound Length (cm) 1 cm 03/17/23 1052   Wound Width (cm) 1 5 cm 03/17/23 1052   Wound Depth (cm) 0 1 cm 03/17/23 1052   Wound Surface Area (cm^2) 1 5 cm^2 03/17/23 1052   Wound Volume (cm^3) 0 15 cm^3 03/17/23 1052   Calculated Wound Volume (cm^3) 0 15 cm^3 03/17/23 1052   Drainage Amount Small 03/17/23 1052   Drainage Description Yellow 03/17/23 1052   Patient Tolerance Tolerated well 03/17/23 1052   Dressing Status Removed 03/17/23 1052       /68   Pulse 68   Temp 97 5 °F (36 4 °C) (Tympanic)   Resp 16     Physical Exam  Vitals and nursing note reviewed  Exam conducted with a chaperone present  Constitutional:       Appearance: Normal appearance  Cardiovascular:      Rate and Rhythm: Normal rate and regular rhythm  Pulmonary:      Effort: Pulmonary effort is normal       Breath sounds: Normal breath sounds  Abdominal:      General: There is no distension  Palpations: Abdomen is soft  There is no mass  Tenderness: There is no abdominal tenderness  Comments: Ostomy   Musculoskeletal:      Comments: Paraplegia  Right hip disarticulation and removal   Skin:     General: Skin is warm and dry  Comments: See complete wound assessment   Neurological:      Mental Status: He is alert     Psychiatric: Mood and Affect: Mood normal          Behavior: Behavior normal            Wound Instructions:  Orders Placed This Encounter   Procedures   • Wound cleansing and dressings     Wounds Left Hip  Cleanse/Irrigate wound with normal saline  Apply a piece of aquacel rope to wound bed  Cover with allevyn bordered foam to hip wound   Change three times a week or as needed for drainage      R lateral back:  Gently pack with aquacel AG rope  DO NOT SUBSTITUE Tape the tail  Cover with ABD  Secure with tape  Change daily or as needed for drainage      Perineum and Left Buttock Wound:  Skin prep to periwound  Gently pack with aquacel ag rope  DO NOT SUBSTITUE Extend into left buttock and Tape tail  Cover with allevyn life  Change three times a week or as needed for drainage      You need to keep pressure off of right back wound          HOME CARE  Continue home care services/skilled nursing - 3 x per week (family to do in between), daily visits for one week to pack right back wound   Silver nitrate used at visit to perineum wound  Area may appear gray/black for a few days with increased drainage      OFF-LOADING  Avoid pressure at wound site  - all wounds, including right back  Wheelchair Cushion  - ROHO cushion  Do Not Sit for Long Periods of Time  - 1 hr max for meals only, otherwise in bed and turn side to side at least  every 3 hours or more frequently  Reposition in regular bed for now at least every 1-2 hours while awake      Tissue sample sent to lab  Follow up in 4 weeks with Dr Cornell Alcala treatment note: Cleansed with NSS and dressed as above  Standing Status:   Future     Standing Expiration Date:   3/17/2024   • Debridement     This order was created via procedure documentation   • Debridement     This order was created via procedure documentation   • Debridement     This order was created via procedure documentation        Diagnosis ICD-10-CM Associated Orders   1   Stage IV pressure ulcer of sacral region [facility])  L89 154 Wound cleansing and dressings      2  Stage IV pressure ulcer of right lower back (HCC)  L89 134 Wound cleansing and dressings      3  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)  G47 327 Wound cleansing and dressings      4   Left perineal ischial pressure ulcer, stage IV (HCC)  L89 324 Wound cleansing and dressings

## 2023-03-24 NOTE — ASSESSMENT & PLAN NOTE
The wounds the buttock and the perineum still connect underneath  Base was debrided of slough  Continue the same dressings

## 2023-03-25 VITALS
DIASTOLIC BLOOD PRESSURE: 60 MMHG | TEMPERATURE: 97.6 F | OXYGEN SATURATION: 97 % | HEART RATE: 86 BPM | SYSTOLIC BLOOD PRESSURE: 110 MMHG | RESPIRATION RATE: 18 BRPM

## 2023-03-27 ENCOUNTER — HOME CARE VISIT (OUTPATIENT)
Dept: HOME HEALTH SERVICES | Facility: HOME HEALTHCARE | Age: 63
End: 2023-03-27

## 2023-03-27 VITALS
OXYGEN SATURATION: 97 % | RESPIRATION RATE: 18 BRPM | DIASTOLIC BLOOD PRESSURE: 60 MMHG | HEART RATE: 78 BPM | TEMPERATURE: 97.6 F | SYSTOLIC BLOOD PRESSURE: 110 MMHG

## 2023-03-29 ENCOUNTER — HOME CARE VISIT (OUTPATIENT)
Dept: HOME HEALTH SERVICES | Facility: HOME HEALTHCARE | Age: 63
End: 2023-03-29

## 2023-03-29 VITALS
OXYGEN SATURATION: 96 % | DIASTOLIC BLOOD PRESSURE: 60 MMHG | HEART RATE: 72 BPM | RESPIRATION RATE: 18 BRPM | TEMPERATURE: 97.3 F | SYSTOLIC BLOOD PRESSURE: 110 MMHG

## 2023-03-31 ENCOUNTER — HOME CARE VISIT (OUTPATIENT)
Dept: HOME HEALTH SERVICES | Facility: HOME HEALTHCARE | Age: 63
End: 2023-03-31

## 2023-03-31 ENCOUNTER — TELEMEDICINE (OUTPATIENT)
Dept: UROLOGY | Facility: CLINIC | Age: 63
End: 2023-03-31

## 2023-03-31 VITALS
DIASTOLIC BLOOD PRESSURE: 60 MMHG | SYSTOLIC BLOOD PRESSURE: 110 MMHG | HEART RATE: 78 BPM | OXYGEN SATURATION: 97 % | RESPIRATION RATE: 18 BRPM | TEMPERATURE: 98 F

## 2023-03-31 DIAGNOSIS — N31.9 NEUROGENIC BLADDER: ICD-10-CM

## 2023-03-31 DIAGNOSIS — R97.20 ELEVATED PSA: Primary | ICD-10-CM

## 2023-03-31 PROCEDURE — 10330064 CATH TRAY, FOLEY SYR 10CC (20/CS)

## 2023-03-31 PROCEDURE — 10330064 DRESSING, CALCIUM ALGINATE AG SHEET 4"X4

## 2023-03-31 PROCEDURE — 10330064

## 2023-03-31 PROCEDURE — 10330064 FOAM, ADH SIL W/BORDER SACRAL 7"X7" (10/

## 2023-03-31 PROCEDURE — 10330064 TAPE, RETENTION 2"X10YDS (1/BX24BX/CS)

## 2023-03-31 PROCEDURE — 10330064 CATHETER, FOLEY 2WAY 5CC 16FR (10/BX)

## 2023-03-31 PROCEDURE — 10330064 CLEANSER, WND SEA-CLEANS 6OZ  COLPLT

## 2023-03-31 PROCEDURE — 10330064 DRESSING, HYDROCELLULAR ADH STR FOAM 4"X

## 2023-03-31 PROCEDURE — 10330064 SYRINGE, LL 10CC (100/BX 12BX/CS)

## 2023-03-31 PROCEDURE — 10330064 PAD, ABD 5X9" STR LF (1/PK 20PK/BX) MGM1

## 2023-03-31 PROCEDURE — 10330064 SPONGE, GAUZE 8PLY N/S 4"X4" (200/PK 20P

## 2023-03-31 NOTE — PROGRESS NOTES
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  He is now managed with an indwelling suprapubic tube change monthly and he is doing well with this  No febrile infections  He looks the best I have seen him in quite a while  No recent hospitalizations  He is interested in going back to straight catheterization  Elevated PSA- s/p negative biopsy in past, runs in 4 range- 4 5 3/2/2023  Has hx of infected IPP, explanted  A/P - as above  Will set for cystoscopy to evaluate urethra to see if he can go back to straight cath  Problem List Items Addressed This Visit    None      Recent Visits  No visits were found meeting these conditions  Showing recent visits within past 7 days and meeting all other requirements  Today's Visits  Date Type Provider Dept   03/31/23 Telemedicine Rey Nieto MD [facility] [facility]   Showing today's visits and meeting all other requirements  Future Appointments  No visits were found meeting these conditions    Showing future appointments within next 150 days and meeting all other requirements         Visit Time    Visit Start Time: 2654  Visit Stop Time: 6891  Total Visit Duration: 14 minutes

## 2023-04-01 VITALS
TEMPERATURE: 97.1 F | RESPIRATION RATE: 18 BRPM | OXYGEN SATURATION: 98 % | HEART RATE: 78 BPM | DIASTOLIC BLOOD PRESSURE: 68 MMHG | SYSTOLIC BLOOD PRESSURE: 126 MMHG

## 2023-04-03 ENCOUNTER — HOME CARE VISIT (OUTPATIENT)
Dept: HOME HEALTH SERVICES | Facility: HOME HEALTHCARE | Age: 63
End: 2023-04-03

## 2023-04-05 VITALS
TEMPERATURE: 97.3 F | OXYGEN SATURATION: 98 % | HEART RATE: 76 BPM | SYSTOLIC BLOOD PRESSURE: 110 MMHG | RESPIRATION RATE: 18 BRPM | DIASTOLIC BLOOD PRESSURE: 60 MMHG

## 2023-04-05 PROCEDURE — 10330064

## 2023-04-05 PROCEDURE — 10330064 DRESSING, HYDROCELLULAR ADH STR FOAM 4"X

## 2023-04-11 PROCEDURE — 10330064

## 2023-04-11 PROCEDURE — 10330064 DRESSING, HYDROCELLULAR ADH STR FOAM 4"X

## 2023-04-11 PROCEDURE — 10330064 PAD, ABD 5X9" STR LF (1/PK 20PK/BX) MGM1

## 2023-04-17 PROCEDURE — 10330064 SYRINGE, LL 10CC (100/BX 12BX/CS)

## 2023-04-17 PROCEDURE — 10330064

## 2023-04-17 PROCEDURE — 10330064 CLEANSER, WND SEA-CLEANS 6OZ  COLPLT

## 2023-04-17 PROCEDURE — 10330064 CATHETER, FOLEY 2WAY 5CC 16FR (10/BX)

## 2023-04-17 PROCEDURE — 10330064 PAD, ABD 5X9" STR LF (1/PK 20PK/BX) MGM1

## 2023-04-17 PROCEDURE — 10330064 SPONGE, GAUZE 8PLY N/S 4"X4" (200/PK 20P

## 2023-04-17 PROCEDURE — 10330064 SALINE, IRR SOL STR 100ML (48/CS) MGM37

## 2023-04-17 PROCEDURE — 10330064 TAPE, RETENTION 2"X10YDS (1/BX24BX/CS)

## 2023-04-17 PROCEDURE — 10330064 CATH TRAY, FOLEY SYR 10CC (20/CS)

## 2023-04-17 PROCEDURE — 10330064 WIPE, SKIN GEL PROT DRSNG (50/BX)

## 2023-04-17 PROCEDURE — 10330064 DRESSING, ALLEVYN BRDR LT 4X4"(10/BX 6BX

## 2023-04-17 PROCEDURE — 10330064 SYRINGE, CATH TIP FLAT STR 60CC (50/CS)

## 2023-04-17 PROCEDURE — 10330064 FOAM, ADH SIL W/BORDER SACRAL 7"X7" (10/

## 2023-04-19 PROCEDURE — 10330064 CLEANSER, WND SEA-CLEANS 6OZ  COLPLT

## 2023-04-19 PROCEDURE — 10330064 TAPE, RETENTION 2"X10YDS (1/BX24BX/CS)

## 2023-04-19 PROCEDURE — 10330064 SPONGE, GAUZE 8PLY N/S 4"X4" (200/PK 20P

## 2023-04-19 PROCEDURE — 10330064 PAD, ABD 5X9" STR LF (1/PK 20PK/BX) MGM1

## 2023-04-19 PROCEDURE — 10330064 SALINE, IRR SOL STR 100ML (48/CS) MGM37

## 2023-04-23 DIAGNOSIS — L89.324 STAGE IV PRESSURE ULCER OF LEFT BUTTOCK (HCC): ICD-10-CM

## 2023-04-23 DIAGNOSIS — M54.50 CHRONIC LOW BACK PAIN WITHOUT SCIATICA, UNSPECIFIED BACK PAIN LATERALITY: ICD-10-CM

## 2023-04-23 DIAGNOSIS — G89.29 CHRONIC LOW BACK PAIN WITHOUT SCIATICA, UNSPECIFIED BACK PAIN LATERALITY: ICD-10-CM

## 2023-04-24 ENCOUNTER — HOME CARE VISIT (OUTPATIENT)
Dept: HOME HEALTH SERVICES | Facility: HOME HEALTHCARE | Age: 63
End: 2023-04-24

## 2023-04-24 VITALS
HEART RATE: 88 BPM | DIASTOLIC BLOOD PRESSURE: 78 MMHG | OXYGEN SATURATION: 97 % | SYSTOLIC BLOOD PRESSURE: 132 MMHG | TEMPERATURE: 97.8 F | RESPIRATION RATE: 22 BRPM

## 2023-04-25 RX ORDER — ASPIRIN 81 MG/1
81 TABLET ORAL DAILY
Qty: 90 TABLET | Refills: 0 | Status: SHIPPED | OUTPATIENT
Start: 2023-04-25

## 2023-04-25 RX ORDER — IBUPROFEN 800 MG/1
800 TABLET ORAL EVERY 8 HOURS PRN
Qty: 60 TABLET | Refills: 0 | Status: SHIPPED | OUTPATIENT
Start: 2023-04-25

## 2023-04-25 RX ORDER — OXYCODONE HYDROCHLORIDE 20 MG/1
20 TABLET ORAL 3 TIMES DAILY PRN
Qty: 90 TABLET | Refills: 0 | Status: SHIPPED | OUTPATIENT
Start: 2023-04-25

## 2023-04-26 ENCOUNTER — HOME CARE VISIT (OUTPATIENT)
Dept: HOME HEALTH SERVICES | Facility: HOME HEALTHCARE | Age: 63
End: 2023-04-26

## 2023-04-26 VITALS
SYSTOLIC BLOOD PRESSURE: 136 MMHG | DIASTOLIC BLOOD PRESSURE: 78 MMHG | OXYGEN SATURATION: 97 % | RESPIRATION RATE: 18 BRPM | TEMPERATURE: 97.3 F | HEART RATE: 68 BPM

## 2023-04-28 ENCOUNTER — HOME CARE VISIT (OUTPATIENT)
Dept: HOME HEALTH SERVICES | Facility: HOME HEALTHCARE | Age: 63
End: 2023-04-28

## 2023-04-28 VITALS
HEART RATE: 64 BPM | OXYGEN SATURATION: 97 % | TEMPERATURE: 97.8 F | RESPIRATION RATE: 18 BRPM | SYSTOLIC BLOOD PRESSURE: 120 MMHG | DIASTOLIC BLOOD PRESSURE: 60 MMHG

## 2023-05-01 ENCOUNTER — HOME CARE VISIT (OUTPATIENT)
Dept: HOME HEALTH SERVICES | Facility: HOME HEALTHCARE | Age: 63
End: 2023-05-01

## 2023-05-01 VITALS
HEART RATE: 90 BPM | RESPIRATION RATE: 18 BRPM | SYSTOLIC BLOOD PRESSURE: 112 MMHG | TEMPERATURE: 97.8 F | DIASTOLIC BLOOD PRESSURE: 68 MMHG | OXYGEN SATURATION: 98 %

## 2023-05-03 ENCOUNTER — HOME CARE VISIT (OUTPATIENT)
Dept: HOME HEALTH SERVICES | Facility: HOME HEALTHCARE | Age: 63
End: 2023-05-03

## 2023-05-04 ENCOUNTER — TELEPHONE (OUTPATIENT)
Dept: UROLOGY | Facility: MEDICAL CENTER | Age: 63
End: 2023-05-04

## 2023-05-04 NOTE — TELEPHONE ENCOUNTER
Patient cancelled 5/5 visit w/Adrianne - feels not needed  Please schedule patient accordingly per Dr Shon Singh

## 2023-05-05 ENCOUNTER — HOME CARE VISIT (OUTPATIENT)
Dept: HOME HEALTH SERVICES | Facility: HOME HEALTHCARE | Age: 63
End: 2023-05-05

## 2023-05-05 VITALS
HEART RATE: 82 BPM | RESPIRATION RATE: 18 BRPM | TEMPERATURE: 97.6 F | SYSTOLIC BLOOD PRESSURE: 110 MMHG | DIASTOLIC BLOOD PRESSURE: 60 MMHG | OXYGEN SATURATION: 98 %

## 2023-05-05 VITALS
HEART RATE: 62 BPM | RESPIRATION RATE: 20 BRPM | DIASTOLIC BLOOD PRESSURE: 60 MMHG | OXYGEN SATURATION: 95 % | SYSTOLIC BLOOD PRESSURE: 124 MMHG | TEMPERATURE: 97.2 F

## 2023-05-05 PROCEDURE — 10330064 TAPE, RETENTION 2"X10YDS (1/BX24BX/CS)

## 2023-05-05 PROCEDURE — 10330064 CLEANSER, WND SEA-CLEANS 6OZ  COLPLT

## 2023-05-05 PROCEDURE — 10330064 FOAM, ADH SIL W/BORDER SACRAL 7"X7" (10/

## 2023-05-05 PROCEDURE — 10330064 DRESSING, HYDROCELLULAR ADH STR FOAM 4"X

## 2023-05-05 PROCEDURE — 10330064 PAD, ABD 5X9" STR LF (1/PK 20PK/BX) MGM1

## 2023-05-05 PROCEDURE — 10330064 DRESSING, CALCIUM ALGINATE AG SHEET 4"X4

## 2023-05-05 PROCEDURE — 10330064 SPONGE, GAUZE 8PLY N/S 4"X4" (200/PK 20P

## 2023-05-05 PROCEDURE — 10330064 WIPE, SKIN GEL PROT DRSNG (50/BX)

## 2023-05-08 ENCOUNTER — HOME CARE VISIT (OUTPATIENT)
Dept: HOME HEALTH SERVICES | Facility: HOME HEALTHCARE | Age: 63
End: 2023-05-08

## 2023-05-10 ENCOUNTER — HOME CARE VISIT (OUTPATIENT)
Dept: HOME HEALTH SERVICES | Facility: HOME HEALTHCARE | Age: 63
End: 2023-05-10

## 2023-05-10 VITALS
SYSTOLIC BLOOD PRESSURE: 110 MMHG | TEMPERATURE: 98.2 F | DIASTOLIC BLOOD PRESSURE: 68 MMHG | HEART RATE: 78 BPM | RESPIRATION RATE: 22 BRPM | OXYGEN SATURATION: 99 %

## 2023-05-12 ENCOUNTER — HOME CARE VISIT (OUTPATIENT)
Dept: HOME HEALTH SERVICES | Facility: HOME HEALTHCARE | Age: 63
End: 2023-05-12

## 2023-05-15 ENCOUNTER — HOME CARE VISIT (OUTPATIENT)
Dept: HOME HEALTH SERVICES | Facility: HOME HEALTHCARE | Age: 63
End: 2023-05-15

## 2023-05-15 PROCEDURE — 10330064

## 2023-05-16 VITALS
OXYGEN SATURATION: 97 % | HEART RATE: 74 BPM | TEMPERATURE: 97.8 F | RESPIRATION RATE: 18 BRPM | DIASTOLIC BLOOD PRESSURE: 60 MMHG | SYSTOLIC BLOOD PRESSURE: 110 MMHG

## 2023-05-17 ENCOUNTER — HOME CARE VISIT (OUTPATIENT)
Dept: HOME HEALTH SERVICES | Facility: HOME HEALTHCARE | Age: 63
End: 2023-05-17

## 2023-05-17 VITALS
DIASTOLIC BLOOD PRESSURE: 70 MMHG | OXYGEN SATURATION: 96 % | TEMPERATURE: 97.7 F | HEART RATE: 78 BPM | SYSTOLIC BLOOD PRESSURE: 118 MMHG | RESPIRATION RATE: 20 BRPM

## 2023-05-17 PROCEDURE — 10330064

## 2023-05-18 VITALS
DIASTOLIC BLOOD PRESSURE: 72 MMHG | TEMPERATURE: 97.6 F | SYSTOLIC BLOOD PRESSURE: 126 MMHG | HEART RATE: 78 BPM | RESPIRATION RATE: 18 BRPM | OXYGEN SATURATION: 98 %

## 2023-05-19 ENCOUNTER — HOME CARE VISIT (OUTPATIENT)
Dept: HOME HEALTH SERVICES | Facility: HOME HEALTHCARE | Age: 63
End: 2023-05-19

## 2023-05-20 VITALS
DIASTOLIC BLOOD PRESSURE: 72 MMHG | OXYGEN SATURATION: 96 % | RESPIRATION RATE: 18 BRPM | TEMPERATURE: 96.9 F | SYSTOLIC BLOOD PRESSURE: 122 MMHG | HEART RATE: 72 BPM

## 2023-05-21 ENCOUNTER — HOME CARE VISIT (OUTPATIENT)
Dept: HOME HEALTH SERVICES | Facility: HOME HEALTHCARE | Age: 63
End: 2023-05-21

## 2023-05-21 NOTE — CASE COMMUNICATION
5 21 23  at 1930   pt called to say his suprapubic catheter is blocked    states he tried to flush but is unable    also states urine is coming out around catheter    asking for SN to visit tonight 5 21

## 2023-05-22 ENCOUNTER — HOME CARE VISIT (OUTPATIENT)
Dept: HOME HEALTH SERVICES | Facility: HOME HEALTHCARE | Age: 63
End: 2023-05-22

## 2023-05-22 DIAGNOSIS — G89.29 CHRONIC LOW BACK PAIN WITHOUT SCIATICA, UNSPECIFIED BACK PAIN LATERALITY: ICD-10-CM

## 2023-05-22 DIAGNOSIS — L89.324 STAGE IV PRESSURE ULCER OF LEFT BUTTOCK (HCC): ICD-10-CM

## 2023-05-22 DIAGNOSIS — M54.50 CHRONIC LOW BACK PAIN WITHOUT SCIATICA, UNSPECIFIED BACK PAIN LATERALITY: ICD-10-CM

## 2023-05-22 NOTE — HOME HEALTH
PRN vs made to remove blocked dangelo   pt having limited output since 7am and leaking urine into shorts   sn removed old dangelo and blockage palpable in tip  New 16fr dangelo inserted and moisture noted at insertion site  SN may suggest a 18fr for next dangelo change  Milky cloudy urine noted in leg bag

## 2023-05-23 VITALS
RESPIRATION RATE: 16 BRPM | TEMPERATURE: 98.2 F | HEART RATE: 78 BPM | SYSTOLIC BLOOD PRESSURE: 110 MMHG | DIASTOLIC BLOOD PRESSURE: 70 MMHG

## 2023-05-23 RX ORDER — ASPIRIN 81 MG/1
81 TABLET ORAL DAILY
Qty: 90 TABLET | Refills: 0 | Status: SHIPPED | OUTPATIENT
Start: 2023-05-23

## 2023-05-23 RX ORDER — IBUPROFEN 800 MG/1
800 TABLET ORAL EVERY 8 HOURS PRN
Qty: 60 TABLET | Refills: 0 | Status: SHIPPED | OUTPATIENT
Start: 2023-05-23

## 2023-05-23 RX ORDER — OXYCODONE HYDROCHLORIDE 20 MG/1
20 TABLET ORAL 3 TIMES DAILY PRN
Qty: 90 TABLET | Refills: 0 | Status: SHIPPED | OUTPATIENT
Start: 2023-05-23

## 2023-05-24 ENCOUNTER — TELEPHONE (OUTPATIENT)
Dept: FAMILY MEDICINE CLINIC | Facility: CLINIC | Age: 63
End: 2023-05-24

## 2023-05-24 ENCOUNTER — HOME CARE VISIT (OUTPATIENT)
Dept: HOME HEALTH SERVICES | Facility: HOME HEALTHCARE | Age: 63
End: 2023-05-24

## 2023-05-24 NOTE — TELEPHONE ENCOUNTER
the oxycodone 20 milligram tablets  These remain on a back order for us that we can't get any in no release date  We do have oxycodone 15 milligram and 30 milligram tablets in stock as an alternative  An alternative can be I sent resent

## 2023-05-25 VITALS
RESPIRATION RATE: 18 BRPM | OXYGEN SATURATION: 97 % | TEMPERATURE: 97 F | DIASTOLIC BLOOD PRESSURE: 70 MMHG | HEART RATE: 78 BPM | SYSTOLIC BLOOD PRESSURE: 118 MMHG

## 2023-05-25 DIAGNOSIS — G82.20 PARAPLEGIC SPINAL PARALYSIS (HCC): ICD-10-CM

## 2023-05-25 DIAGNOSIS — L89.324 STAGE IV PRESSURE ULCER OF LEFT BUTTOCK (HCC): ICD-10-CM

## 2023-05-25 DIAGNOSIS — G89.29 CHRONIC LOW BACK PAIN WITHOUT SCIATICA, UNSPECIFIED BACK PAIN LATERALITY: Primary | ICD-10-CM

## 2023-05-25 DIAGNOSIS — M54.50 CHRONIC LOW BACK PAIN WITHOUT SCIATICA, UNSPECIFIED BACK PAIN LATERALITY: Primary | ICD-10-CM

## 2023-05-25 PROCEDURE — 10330064 SYRINGE, CATH TIP FLAT STR 60CC (50/CS)

## 2023-05-25 PROCEDURE — 10330064 TAPE, RETENTION 2"X10YDS (1/BX24BX/CS)

## 2023-05-25 PROCEDURE — 10330064 SPONGE, GAUZE 8PLY N/S 4"X4" (200/PK 20P

## 2023-05-25 PROCEDURE — 10330064 SYRINGE, LL 10CC (100/BX 12BX/CS)

## 2023-05-25 PROCEDURE — 10330064 PAD, ABD 5X9" STR LF (1/PK 20PK/BX) MGM1

## 2023-05-25 PROCEDURE — 10330064 WIPE, SKIN GEL PROT DRSNG (50/BX)

## 2023-05-25 PROCEDURE — 10330064 CLEANSER, WND SEA-CLEANS 6OZ  COLPLT

## 2023-05-25 RX ORDER — OXYCODONE HYDROCHLORIDE 15 MG/1
15 TABLET ORAL EVERY 6 HOURS PRN
Qty: 120 TABLET | Refills: 0 | Status: SHIPPED | OUTPATIENT
Start: 2023-05-25 | End: 2023-06-22 | Stop reason: SDUPTHER

## 2023-05-26 ENCOUNTER — HOME CARE VISIT (OUTPATIENT)
Dept: HOME HEALTH SERVICES | Facility: HOME HEALTHCARE | Age: 63
End: 2023-05-26

## 2023-05-26 ENCOUNTER — TELEPHONE (OUTPATIENT)
Dept: HOME HEALTH SERVICES | Facility: HOME HEALTHCARE | Age: 63
End: 2023-05-26

## 2023-05-29 ENCOUNTER — HOME CARE VISIT (OUTPATIENT)
Dept: HOME HEALTH SERVICES | Facility: HOME HEALTHCARE | Age: 63
End: 2023-05-29

## 2023-05-30 VITALS
RESPIRATION RATE: 18 BRPM | OXYGEN SATURATION: 97 % | DIASTOLIC BLOOD PRESSURE: 70 MMHG | TEMPERATURE: 97.8 F | HEART RATE: 74 BPM | SYSTOLIC BLOOD PRESSURE: 110 MMHG

## 2023-05-31 ENCOUNTER — HOME CARE VISIT (OUTPATIENT)
Dept: HOME HEALTH SERVICES | Facility: HOME HEALTHCARE | Age: 63
End: 2023-05-31
Payer: MEDICARE

## 2023-05-31 PROCEDURE — G0299 HHS/HOSPICE OF RN EA 15 MIN: HCPCS

## 2023-06-02 ENCOUNTER — HOME CARE VISIT (OUTPATIENT)
Dept: HOME HEALTH SERVICES | Facility: HOME HEALTHCARE | Age: 63
End: 2023-06-02
Payer: MEDICARE

## 2023-06-02 ENCOUNTER — TELEPHONE (OUTPATIENT)
Dept: FAMILY MEDICINE CLINIC | Facility: CLINIC | Age: 63
End: 2023-06-02

## 2023-06-02 PROCEDURE — 10330064 WIPE, SKIN GEL PROT DRSNG (50/BX)

## 2023-06-02 PROCEDURE — G0299 HHS/HOSPICE OF RN EA 15 MIN: HCPCS

## 2023-06-02 PROCEDURE — 10330064 PAD, ABD 5X9" STR LF (1/PK 20PK/BX) MGM1

## 2023-06-02 PROCEDURE — 10330064 SPONGE, GAUZE 8PLY N/S 4"X4" (200/PK 20P

## 2023-06-02 PROCEDURE — 10330064 TAPE, RETENTION 2"X10YDS (1/BX24BX/CS)

## 2023-06-02 PROCEDURE — 10330064 CLEANSER, WND SEA-CLEANS 6OZ  COLPLT

## 2023-06-02 PROCEDURE — 10330064 FOAM, ADH SIL W/BORDER SACRAL 7"X7" (10/

## 2023-06-02 NOTE — TELEPHONE ENCOUNTER
The instructions were IF he had no improvement of his non formed bowel movements (diarrhea) than he was supposed to let us know so I could order stool test

## 2023-06-02 NOTE — TELEPHONE ENCOUNTER
Hi, this is Maribel Heller, one of the visiting nurses with [facility] calling regarding a mutual patient of Doctor aSrah Perez  Name  Rikki ROACH  My number is four, eight, four, two, four  One, five, four, seven four  I'm calling because the patient seems to be under the impression that the doctor wanted a stool specimen back when he last saw her in April  But I don't see any orders for one or what type of stool specimen  He does have a colostomy  And then there was a question of maybe him having an issue bacteria wise or something  So I'm just calling to find out if the doctor did indeed want to specimen and what kind she wants  Again  My number is four eight four, two four one five, four seven four  And the patient 's date of birth is one two [de-identified] one  And the last name is Fanta  PATTI ROACH 's first name is O C T A V I O  Alerickaht, thank you  Bye now

## 2023-06-04 VITALS
TEMPERATURE: 97.6 F | SYSTOLIC BLOOD PRESSURE: 110 MMHG | HEART RATE: 74 BPM | RESPIRATION RATE: 18 BRPM | DIASTOLIC BLOOD PRESSURE: 70 MMHG | OXYGEN SATURATION: 97 %

## 2023-06-04 VITALS
DIASTOLIC BLOOD PRESSURE: 60 MMHG | HEART RATE: 82 BPM | SYSTOLIC BLOOD PRESSURE: 110 MMHG | RESPIRATION RATE: 18 BRPM | OXYGEN SATURATION: 97 % | TEMPERATURE: 97.5 F

## 2023-06-05 ENCOUNTER — TELEPHONE (OUTPATIENT)
Dept: FAMILY MEDICINE CLINIC | Facility: CLINIC | Age: 63
End: 2023-06-05

## 2023-06-05 ENCOUNTER — HOME CARE VISIT (OUTPATIENT)
Dept: HOME HEALTH SERVICES | Facility: HOME HEALTHCARE | Age: 63
End: 2023-06-05
Payer: MEDICARE

## 2023-06-05 PROCEDURE — G0299 HHS/HOSPICE OF RN EA 15 MIN: HCPCS

## 2023-06-05 NOTE — TELEPHONE ENCOUNTER
Please see my message from 6/2/2023  Radha Vu did try to call her back and clarify but voice mail was full

## 2023-06-05 NOTE — TELEPHONE ENCOUNTER
Hi, this is Marry Mustafa, one of the visiting nurses with [facility]  I left a message again on Friday  I'm trying to get in touch with Doctor Paola Casas to clarify if a stool specimen is needed for her mutual patient  The name is Darian Ferguson,  Date of birth [birth year]  He had an appointment in April with her in which she mentioned to her that she wanted to obtain some type of stool specimen  I don't see any orders and I looked at the visit note  I don't see anything in there  So if someone could get back to me and let me know the patients inquiring my number is [phone]  Alright  Thank you  Bye now

## 2023-06-07 ENCOUNTER — HOME CARE VISIT (OUTPATIENT)
Dept: HOME HEALTH SERVICES | Facility: HOME HEALTHCARE | Age: 63
End: 2023-06-07
Payer: MEDICARE

## 2023-06-07 PROCEDURE — G0299 HHS/HOSPICE OF RN EA 15 MIN: HCPCS

## 2023-06-08 VITALS
HEART RATE: 100 BPM | OXYGEN SATURATION: 96 % | DIASTOLIC BLOOD PRESSURE: 60 MMHG | SYSTOLIC BLOOD PRESSURE: 110 MMHG | TEMPERATURE: 97.3 F | RESPIRATION RATE: 18 BRPM

## 2023-06-08 VITALS
TEMPERATURE: 97.3 F | OXYGEN SATURATION: 96 % | DIASTOLIC BLOOD PRESSURE: 72 MMHG | SYSTOLIC BLOOD PRESSURE: 110 MMHG | RESPIRATION RATE: 18 BRPM | HEART RATE: 78 BPM

## 2023-06-09 ENCOUNTER — TELEPHONE (OUTPATIENT)
Dept: HOME HEALTH SERVICES | Facility: HOME HEALTHCARE | Age: 63
End: 2023-06-09

## 2023-06-09 ENCOUNTER — OFFICE VISIT (OUTPATIENT)
Dept: WOUND CARE | Facility: CLINIC | Age: 63
End: 2023-06-09
Payer: MEDICARE

## 2023-06-09 ENCOUNTER — HOME CARE VISIT (OUTPATIENT)
Dept: HOME HEALTH SERVICES | Facility: HOME HEALTHCARE | Age: 63
End: 2023-06-09
Payer: MEDICARE

## 2023-06-09 VITALS
RESPIRATION RATE: 18 BRPM | TEMPERATURE: 99.3 F | DIASTOLIC BLOOD PRESSURE: 76 MMHG | HEART RATE: 88 BPM | SYSTOLIC BLOOD PRESSURE: 110 MMHG

## 2023-06-09 DIAGNOSIS — L89.324 LEFT PERINEAL ISCHIAL PRESSURE ULCER, STAGE IV (HCC): ICD-10-CM

## 2023-06-09 DIAGNOSIS — L89.134 STAGE IV PRESSURE ULCER OF RIGHT LOWER BACK (HCC): ICD-10-CM

## 2023-06-09 DIAGNOSIS — L89.223 STAGE III PRESSURE ULCER OF LEFT HIP (HCC): ICD-10-CM

## 2023-06-09 DIAGNOSIS — L89.154 STAGE IV PRESSURE ULCER OF SACRAL REGION (HCC): Primary | ICD-10-CM

## 2023-06-09 PROCEDURE — 11042 DBRDMT SUBQ TIS 1ST 20SQCM/<: CPT | Performed by: SURGERY

## 2023-06-09 RX ORDER — LIDOCAINE 40 MG/G
CREAM TOPICAL ONCE
Status: COMPLETED | OUTPATIENT
Start: 2023-06-09 | End: 2023-06-09

## 2023-06-09 RX ADMIN — LIDOCAINE: 40 CREAM TOPICAL at 09:50

## 2023-06-09 NOTE — ASSESSMENT & PLAN NOTE
The ischial wound connects to the perineal wound and has some undermining  The base is mostly granulated tissue  There is no bone exposed  Continue the same care

## 2023-06-09 NOTE — PATIENT INSTRUCTIONS
Orders Placed This Encounter   Procedures    Wound cleansing and dressings     Wounds Left Hip  Cleanse/Irrigate wound with normal saline  Apply a piece of aquacel rope to wound bed  Cover with allevyn bordered foam to hip wound   Change three times a week or as needed for drainage  R lateral back:  Gently pack with aquacel AG rope  DO NOT SUBSTITUE Tape the tail  Cover with ABD  Secure with tape  Change daily or as needed for drainage  Perineum and Left Buttock Wound:  Skin prep to periwound  Gently pack with aquacel ag rope  DO NOT SUBSTITUE Extend into left buttock and Tape tail  Cover with allevyn life  Change three times a week or as needed for drainage  You need to keep pressure off of right back wound  HOME CARE  Continue home care services/skilled nursing - 3 x per week (family to do in between), daily visits for one week to pack right back wound   Silver nitrate used at visit to perineum wound  Area may appear gray/black for a few days with increased drainage  OFF-LOADING  Avoid pressure at wound site  - all wounds, including right back  Wheelchair Cushion  - ROHO cushion  Do Not Sit for Long Periods of Time  - 1 hr max for meals only, otherwise in bed and turn side to side at least  every 3 hours or more frequently  Reposition in regular bed for now at least every 1-2 hours while awake  Follow up in 4 weeks with Dr Levar Frazier treatment note: Cleansed with NSS and dressed as above       Standing Status:   Future     Standing Expiration Date:   6/9/2024

## 2023-06-09 NOTE — PROGRESS NOTES
"Patient ID: Greg Smith is a 59 y o  male Date of Birth 1960     Chief Complaint  Chief Complaint   Patient presents with   • Follow Up Wound Care Visit     Patient is here for a follow up for pressure ulcers to the buttocks and left flank  Arrived with dressings intact  Allergies  Patient has no known allergies  Assessment:    Stage IV pressure ulcer of right lower back ([address] )  The wound was debrided and we will continue the same care  The long tunnel on the ribs has closed down    Left perineal ischial pressure ulcer, stage IV (HCC)  The ischial wound connects to the perineal wound and has some undermining  The base is mostly granulated tissue  There is no bone exposed  Continue the same care  Diagnoses and all orders for this visit:    Stage IV pressure ulcer of sacral region ([address] )  -     lidocaine (LMX) 4 % cream  -     Wound cleansing and dressings; Future    Stage IV pressure ulcer of right lower back (HCC)  -     lidocaine (LMX) 4 % cream  -     Wound cleansing and dressings; Future    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)  -     lidocaine (LMX) 4 % cream  -     Wound cleansing and dressings; Future    Left perineal ischial pressure ulcer, stage IV (HCC)  -     lidocaine (LMX) 4 % cream  -     Wound cleansing and dressings; Future    Other orders  -     Debridement              Debridement   Wound 07/29/21 Pressure Injury Back Right; Lower; Lateral    Universal Protocol:  Consent given by: patient  Time out: Immediately prior to procedure a \"time out\" was called to verify the correct patient, procedure, equipment, support staff and site/side marked as required      Performed by: physician  Debridement type: surgical  Level of debridement: subcutaneous tissue  Pain control: lidocaine 4%  Post-debridement measurements  Length (cm): 3 5  Width (cm): 1 1  Depth (cm): 3 8  Percent debrided: 100%  Surface Area (cm^2): 3 85  Area debrided (cm^2): 3 85  Volume (cm^3): 14 63  Tissue and " other material debrided: subcutaneous tissue  Devitalized tissue debrided: biofilm and slough  Instrument(s) utilized: curette  Procedural pain (0-10): insensate  Post-procedural pain: insensate   Response to treatment: procedure was tolerated well          Plan:     Wound 08/26/20 Pressure Injury Buttocks Left (Active)   Wound Image Images linked 06/09/23 0912   Wound Description Pink;Yellow 06/09/23 0921   Pressure Injury Stage 4 06/09/23 0921   Shelby-wound Assessment Scar Tissue; Maceration 06/09/23 0921   Wound Length (cm) 3 8 cm 06/09/23 0921   Wound Width (cm) 1 9 cm 06/09/23 0921   Wound Depth (cm) 1 2 cm 06/09/23 0921   Wound Surface Area (cm^2) 7 22 cm^2 06/09/23 0921   Wound Volume (cm^3) 8 664 cm^3 06/09/23 0921   Calculated Wound Volume (cm^3) 8 66 cm^3 06/09/23 0921   Change in Wound Size % 69 93 06/09/23 0921   Tunneling in depth located at wound communicates under a skin bridge at 3 oclock 06/09/23 0921   Undermining 4 3 06/09/23 0921   Undermining is depth extending from 3-11 06/09/23 0921   Drainage Amount Moderate 06/09/23 0921   Drainage Description Tan 06/09/23 0921       Wound 08/26/20 Pressure Injury Hip Left (Active)   Wound Image Images linked 06/09/23 0913   Wound Description Epithelialization;Pink 06/09/23 0918   Shelby-wound Assessment Scar Tissue; Intact; Maceration 06/09/23 0918   Wound Length (cm) 0 8 cm 06/09/23 0918   Wound Width (cm) 1 3 cm 06/09/23 0918   Wound Depth (cm) 0 2 cm 06/09/23 0918   Wound Surface Area (cm^2) 1 04 cm^2 06/09/23 0918   Wound Volume (cm^3) 0 208 cm^3 06/09/23 0918   Calculated Wound Volume (cm^3) 0 21 cm^3 06/09/23 0918   Change in Wound Size % 8 7 06/09/23 0918   Drainage Amount Small 06/09/23 0918   Drainage Description Serosanguineous 06/09/23 0918   Patient Tolerance Tolerated well 06/09/23 0918   Dressing Status Removed 06/09/23 0918       Wound 07/29/21 Pressure Injury Back Right; Lower; Lateral (Active)   Wound Image Images linked 06/09/23 0948   Wound Description Yellow;Pink 06/09/23 0926   Pressure Injury Stage 3 06/09/23 0926   Shelby-wound Assessment Scar Tissue; Maceration 06/09/23 0926   Wound Length (cm) 3 5 cm 06/09/23 0926   Wound Width (cm) 1 1 cm 06/09/23 0926   Wound Depth (cm) 3 8 cm 06/09/23 0926   Wound Surface Area (cm^2) 3 85 cm^2 06/09/23 0926   Wound Volume (cm^3) 14 63 cm^3 06/09/23 0926   Calculated Wound Volume (cm^3) 14 63 cm^3 06/09/23 0926   Change in Wound Size % 15 73 06/09/23 0926   Undermining 4 2 06/09/23 0926   Undermining is depth extending from 9-3 06/09/23 0926   Drainage Amount Moderate 06/09/23 0926   Drainage Description Tan 06/09/23 0926       Wound 11/05/21 Pressure Injury Perineum (Active)   Wound Image Images linked 06/09/23 0914   Wound Description Pink;Yellow 06/09/23 0924   Pressure Injury Stage 3 06/09/23 0924   Shelby-wound Assessment Intact;Scar Tissue 06/09/23 0924   Wound Length (cm) 1 8 cm 06/09/23 0924   Wound Width (cm) 1 2 cm 06/09/23 0924   Wound Depth (cm) 1 6 cm 06/09/23 0924   Wound Surface Area (cm^2) 2 16 cm^2 06/09/23 0924   Wound Volume (cm^3) 3  456 cm^3 06/09/23 0924   Calculated Wound Volume (cm^3) 3 46 cm^3 06/09/23 0924   Change in Wound Size % -3360 06/09/23 0924   Tunneling in depth located at wound communicates [facility] buttock wound at 9 oclock 06/09/23 0924   Undermining 4 1 06/09/23 0924   Undermining is depth extending from 1-8 06/09/23 0924   Drainage Amount Moderate 06/09/23 0924   Drainage Description Alva 06/09/23 0924       Wound 03/17/23 Pressure Injury Hip Left;Proximal (Active)   Wound Image Images linked 06/09/23 0913   Wound Description Pink;Epithelialization 06/09/23 0917   Shelby-wound Assessment Scar Tissue; Intact 06/09/23 0917   Wound Length (cm) 1 2 cm 06/09/23 0917   Wound Width (cm) 3 5 cm 06/09/23 0917   Wound Depth (cm) 0 1 cm 06/09/23 0917   Wound Surface Area (cm^2) 4 2 cm^2 06/09/23 0917   Wound Volume (cm^3) 0 42 cm^3 06/09/23 0917   Calculated Wound Volume (cm^3) 0 42 cm^3 06/09/23 9943   Change in Wound Size % -180 06/09/23 0917   Drainage Amount Small 06/09/23 0917   Drainage Description Serosanguineous 06/09/23 0917   Patient Tolerance Tolerated well 06/09/23 0917   Dressing Status Removed 06/09/23 0917       Wound 08/26/20 Pressure Injury Buttocks Left (Active)   Date First Assessed/Time First Assessed: 08/26/20 1056   Primary Wound Type: Pressure Injury  Location: Buttocks  Wound Location Orientation: Left  Wound Outcome: Palliative       Wound 08/26/20 Pressure Injury Hip Left (Active)   Date First Assessed/Time First Assessed: 08/26/20 1057   Primary Wound Type: Pressure Injury  Location: Hip  Wound Location Orientation: Left  Wound Outcome: Palliative       Wound 07/29/21 Pressure Injury Back Right; Lower; Lateral (Active)   Date First Assessed: 07/29/21   Primary Wound Type: Pressure Injury  Location: Back  Wound Location Orientation: Right; Lower; Lateral  Wound Outcome: Palliative       Wound 11/05/21 Pressure Injury Perineum (Active)   Date First Assessed/Time First Assessed: 11/05/21 1059   Primary Wound Type: Pressure Injury  Location: Perineum  Wound Outcome: Palliative       Wound 03/17/23 Pressure Injury Hip Left;Proximal (Active)   Date First Assessed/Time First Assessed: 03/17/23 1039   Primary Wound Type: Pressure Injury  Location: Hip  Wound Location Orientation: Left;Proximal       [REMOVED] Wound 08/26/19 Buttocks Left;Distal;Lateral (Removed)   Resolved Date/Resolved Time: 08/26/20 1056  Date First Assessed/Time First Assessed: 08/26/19 1130   Pre-Existing Wound: Yes  Location: Buttocks  Wound Location Orientation: Left;Distal;Lateral  Wound Description (Comments): Deep ischial wound       [REMOVED] Wound 09/16/19 Buttocks Right;Distal (Removed)   Resolved Date/Resolved Time: 08/26/20 1055  Date First Assessed/Time First Assessed: 09/16/19 1657   Pre-Existing Wound: Yes  Location: Buttocks  Wound Location Orientation: Right;Distal       [REMOVED] Wound 10/28/19 Knee Left (Removed)   Resolved Date/Resolved Time: 08/26/20 1055  Date First Assessed/Time First Assessed: 10/28/19 0657   Pre-Existing Wound: Yes  Location: Knee  Wound Location Orientation: Left  Wound Description (Comments): round black  Dressing Status: Open to air       [REMOVED] Wound 10/28/19 Buttocks Left;Mid (Removed)   Resolved Date/Resolved Time: 08/26/20 1056  Date First Assessed/Time First Assessed: 10/28/19 1108   Pre-Existing Wound: Yes  Location: Buttocks  Wound Location Orientation: Left;Mid  Wound Description (Comments): Stage 2 vs traumatic injury       [REMOVED] Wound 11/01/19 Other (Comment) N/A (Removed)   Resolved Date/Resolved Time: 08/26/20 1055  Date First Assessed/Time First Assessed: 11/01/19 1640   Location: Other (Comment)  Wound Location Orientation: N/A  Wound Description (Comments): scrotum dressed with exofin       [REMOVED] Wound 05/27/22 Skin Tear Buttocks Left;Proximal (Removed)   Resolved Date/Resolved Time: 08/05/22 0851  Date First Assessed/Time First Assessed: 05/27/22 0946   Primary Wound Type: Skin Tear  Location: Buttocks  Wound Location Orientation: Left;Proximal  Wound Outcome: Healed       Subjective:        Mr Paty Ferreira is a 60-year-old gentleman with paraplegia and history of multiple pressure wounds with 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  He had been rescheduled for a debridement and flap closure by plastics in August but developed a new wound on his right mid to lower back chest wall  This wound probes deep and has been closed on the outside but the tract has not been improving  He had a CT scan that shows a deep tracking to the muscle but no fistulization to the internal thoracic cavity  02/04/2022 he returns now for re-evaluation  He still has had visiting nurses but has not been seen in the [address] [facility] since November  He denies any change or complaint    03/11/2022 no changes since been seen last   No new complaints  04/22/2022 no issues    No changes  05/27/2022  Doing well  Denies fevers or chills  No issues spoke about plastics a re-evaluation but he wants to wait for COVID to wane more and maybe next fall    07/22/2022  He has not been seen here since May mostly due to transportation issues  He has significant more pain and drainage on his right back chest wall wound  Otherwise no changes    08/05/2022 denies fevers or chills  Still in pain on the right side  3/17/2023 he returns for evaluation  He was seen in the wound center by another provider month or 2 ago  6/9/2023  Here for long-term follow-up  No significant changes  The following portions of the patient's history were reviewed and updated as appropriate: allergies, current medications, past family history, past medical history, past social history, past surgical history and problem list     Review of Systems   Constitutional: Negative for chills and fever  HENT: Negative for ear pain and sore throat  Eyes: Negative for pain and visual disturbance  Respiratory: Negative for cough and shortness of breath  Cardiovascular: Negative for chest pain and palpitations  Gastrointestinal: Negative for abdominal pain and vomiting  Skin: Negative for color change and rash  Neurological: Positive for weakness and numbness  Psychiatric/Behavioral: Negative for agitation and behavioral problems  All other systems reviewed and are negative  Objective:       Wound 08/26/20 Pressure Injury Buttocks Left (Active)   Wound Image Images linked 06/09/23 0912   Wound Description Pink;Yellow 06/09/23 0921   Pressure Injury Stage 4 06/09/23 0921   Shelby-wound Assessment Scar Tissue; Maceration 06/09/23 0921   Wound Length (cm) 3 8 cm 06/09/23 0921   Wound Width (cm) 1 9 cm 06/09/23 0921   Wound Depth (cm) 1 2 cm 06/09/23 0921   Wound Surface Area (cm^2) 7 22 cm^2 06/09/23 0921   Wound Volume (cm^3) 8 664 cm^3 06/09/23 0921   Calculated Wound Volume (cm^3) 8 66 cm^3 06/09/23 0921   Change in Wound Size % 69 93 06/09/23 0921   Tunneling in depth located at wound communicates under a skin bridge at 3 oclock 06/09/23 0921   Undermining 4 3 06/09/23 0921   Undermining is depth extending from 3-11 06/09/23 0921   Drainage Amount Moderate 06/09/23 0921   Drainage Description Tan 06/09/23 0921       Wound 08/26/20 Pressure Injury Hip Left (Active)   Wound Image Images linked 06/09/23 0913   Wound Description Epithelialization;Pink 06/09/23 0918   Shelby-wound Assessment Scar Tissue; Intact; Maceration 06/09/23 0918   Wound Length (cm) 0 8 cm 06/09/23 0918   Wound Width (cm) 1 3 cm 06/09/23 0918   Wound Depth (cm) 0 2 cm 06/09/23 0918   Wound Surface Area (cm^2) 1 04 cm^2 06/09/23 0918   Wound Volume (cm^3) 0 208 cm^3 06/09/23 0918   Calculated Wound Volume (cm^3) 0 21 cm^3 06/09/23 0918   Change in Wound Size % 8 7 06/09/23 0918   Drainage Amount Small 06/09/23 0918   Drainage Description Serosanguineous 06/09/23 0918   Patient Tolerance Tolerated well 06/09/23 0918   Dressing Status Removed 06/09/23 0918       Wound 07/29/21 Pressure Injury Back Right; Lower; Lateral (Active)   Wound Image Images linked 06/09/23 0948   Wound Description Yellow;Pink 06/09/23 0926   Pressure Injury Stage 3 06/09/23 0926   Shelby-wound Assessment Scar Tissue; Maceration 06/09/23 0926   Wound Length (cm) 3 5 cm 06/09/23 0926   Wound Width (cm) 1 1 cm 06/09/23 0926   Wound Depth (cm) 3 8 cm 06/09/23 0926   Wound Surface Area (cm^2) 3 85 cm^2 06/09/23 0926   Wound Volume (cm^3) 14 63 cm^3 06/09/23 0926   Calculated Wound Volume (cm^3) 14 63 cm^3 06/09/23 0926   Change in Wound Size % 15 73 06/09/23 0926   Undermining 4 2 06/09/23 0926   Undermining is depth extending from 9-3 06/09/23 0926   Drainage Amount Moderate 06/09/23 0926   Drainage Description Tan 06/09/23 0926       Wound 11/05/21 Pressure Injury Perineum (Active)   Wound Image Images linked 06/09/23 0914   Wound Description Pink;Yellow 06/09/23 8533 Pressure Injury Stage 3 06/09/23 0924   Shelby-wound Assessment Intact;Scar Tissue 06/09/23 0924   Wound Length (cm) 1 8 cm 06/09/23 0924   Wound Width (cm) 1 2 cm 06/09/23 0924   Wound Depth (cm) 1 6 cm 06/09/23 0924   Wound Surface Area (cm^2) 2 16 cm^2 06/09/23 0924   Wound Volume (cm^3) 3  456 cm^3 06/09/23 0924   Calculated Wound Volume (cm^3) 3 46 cm^3 06/09/23 0924   Change in Wound Size % -3360 06/09/23 0924   Tunneling in depth located at wound communicates [facility] buttock wound at 9 oclock 06/09/23 0924   Undermining 4 1 06/09/23 0924   Undermining is depth extending from 1-8 06/09/23 0924   Drainage Amount Moderate 06/09/23 0924   Drainage Description Alva 06/09/23 0924       Wound 03/17/23 Pressure Injury Hip Left;Proximal (Active)   Wound Image Images linked 06/09/23 0913   Wound Description Pink;Epithelialization 06/09/23 0917   Shelby-wound Assessment Scar Tissue; Intact 06/09/23 0917   Wound Length (cm) 1 2 cm 06/09/23 0917   Wound Width (cm) 3 5 cm 06/09/23 0917   Wound Depth (cm) 0 1 cm 06/09/23 0917   Wound Surface Area (cm^2) 4 2 cm^2 06/09/23 0917   Wound Volume (cm^3) 0 42 cm^3 06/09/23 0917   Calculated Wound Volume (cm^3) 0 42 cm^3 06/09/23 0917   Change in Wound Size % -180 06/09/23 0917   Drainage Amount Small 06/09/23 0917   Drainage Description Serosanguineous 06/09/23 0917   Patient Tolerance Tolerated well 06/09/23 0917   Dressing Status Removed 06/09/23 0917       /76   Pulse 88   Temp 99 3 °F (37 4 °C)   Resp 18     Physical Exam  Vitals and nursing note reviewed  Exam conducted with a chaperone present  Constitutional:       Appearance: Normal appearance  Cardiovascular:      Rate and Rhythm: Normal rate and regular rhythm  Pulmonary:      Effort: Pulmonary effort is normal       Breath sounds: Normal breath sounds  Abdominal:      General: There is no distension  Palpations: Abdomen is soft  There is no mass  Tenderness: There is no abdominal tenderness  Comments: Ostomy   Musculoskeletal:      Comments: Paraplegia  Right hip disarticulation and removal   Skin:     General: Skin is warm and dry  Comments: See complete wound assessment   Neurological:      Mental Status: He is alert  Psychiatric:         Mood and Affect: Mood normal          Behavior: Behavior normal            Wound Instructions:  Orders Placed This Encounter   Procedures   • Wound cleansing and dressings     Wounds Left Hip  Cleanse/Irrigate wound with normal saline  Apply a piece of aquacel rope to wound bed  Cover with allevyn bordered foam to hip wound   Change three times a week or as needed for drainage      R lateral back:  Gently pack with aquacel AG rope  DO NOT SUBSTITUE Tape the tail  Cover with ABD  Secure with tape  Change daily or as needed for drainage      Perineum and Left Buttock Wound:  Skin prep to periwound  Gently pack with aquacel ag rope  DO NOT SUBSTITUE Extend into left buttock and Tape tail  Cover with allevyn life  Change three times a week or as needed for drainage      You need to keep pressure off of right back wound          HOME CARE  Continue home care services/skilled nursing - 3 x per week (family to do in between), daily visits for one week to pack right back wound   Silver nitrate used at visit to perineum wound  Area may appear gray/black for a few days with increased drainage      OFF-LOADING  Avoid pressure at wound site  - all wounds, including right back  Wheelchair Cushion  - ROHO cushion  Do Not Sit for Long Periods of Time  - 1 hr max for meals only, otherwise in bed and turn side to side at least  every 3 hours or more frequently  Reposition in regular bed for now at least every 1-2 hours while awake      Follow up in 4 weeks with Dr Steve Strong treatment note: Cleansed with NSS and dressed as above       Standing Status:   Future     Standing Expiration Date:   6/9/2024   • Debridement     This order was created via procedure documentation        Diagnosis ICD-10-CM Associated Orders   1  Stage IV pressure ulcer of sacral region (HCC)  L89 154 lidocaine (LMX) 4 % cream     Wound cleansing and dressings      2  Stage IV pressure ulcer of right lower back (HCC)  L89 134 lidocaine (LMX) 4 % cream     Wound cleansing and dressings      3  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)  L89 223 lidocaine (LMX) 4 % cream     Wound cleansing and dressings      4   Left perineal ischial pressure ulcer, stage IV (HCC)  L89 324 lidocaine (LMX) 4 % cream     Wound cleansing and dressings

## 2023-06-09 NOTE — ASSESSMENT & PLAN NOTE
The wound was debrided and we will continue the same care    The long tunnel on the ribs has closed down

## 2023-06-12 ENCOUNTER — HOME CARE VISIT (OUTPATIENT)
Dept: HOME HEALTH SERVICES | Facility: HOME HEALTHCARE | Age: 63
End: 2023-06-12
Payer: MEDICARE

## 2023-06-12 ENCOUNTER — TELEPHONE (OUTPATIENT)
Dept: HOME HEALTH SERVICES | Facility: HOME HEALTHCARE | Age: 63
End: 2023-06-12

## 2023-06-14 ENCOUNTER — HOME CARE VISIT (OUTPATIENT)
Dept: HOME HEALTH SERVICES | Facility: HOME HEALTHCARE | Age: 63
End: 2023-06-14
Payer: MEDICARE

## 2023-06-14 VITALS
TEMPERATURE: 98 F | HEART RATE: 78 BPM | RESPIRATION RATE: 22 BRPM | SYSTOLIC BLOOD PRESSURE: 112 MMHG | OXYGEN SATURATION: 99 % | DIASTOLIC BLOOD PRESSURE: 68 MMHG

## 2023-06-14 PROCEDURE — G0300 HHS/HOSPICE OF LPN EA 15 MIN: HCPCS

## 2023-06-15 PROCEDURE — 10330064 SALINE, IRR SOL STR 100ML (48/CS) MGM37

## 2023-06-15 PROCEDURE — 10330064 PAD, ABD 5X9" STR LF (1/PK 20PK/BX) MGM1

## 2023-06-16 ENCOUNTER — HOME CARE VISIT (OUTPATIENT)
Dept: HOME HEALTH SERVICES | Facility: HOME HEALTHCARE | Age: 63
End: 2023-06-16
Payer: MEDICARE

## 2023-06-16 PROCEDURE — G0299 HHS/HOSPICE OF RN EA 15 MIN: HCPCS

## 2023-06-19 ENCOUNTER — TELEPHONE (OUTPATIENT)
Dept: FAMILY MEDICINE CLINIC | Facility: CLINIC | Age: 63
End: 2023-06-19

## 2023-06-19 ENCOUNTER — HOME CARE VISIT (OUTPATIENT)
Dept: HOME HEALTH SERVICES | Facility: HOME HEALTHCARE | Age: 63
End: 2023-06-19
Payer: MEDICARE

## 2023-06-19 PROCEDURE — G0299 HHS/HOSPICE OF RN EA 15 MIN: HCPCS

## 2023-06-19 NOTE — TELEPHONE ENCOUNTER
PCP SIGNATURE NEEDED FOR [employer] FORM RECEIVED VIA FAX AND PLACED IN PCP FOLDER TO BE DELIVERED AT ASSIGNED TIMES      [address] [facility]    ACCT# [id number]

## 2023-06-21 ENCOUNTER — HOME CARE VISIT (OUTPATIENT)
Dept: HOME HEALTH SERVICES | Facility: HOME HEALTHCARE | Age: 63
End: 2023-06-21
Payer: MEDICARE

## 2023-06-21 VITALS
DIASTOLIC BLOOD PRESSURE: 70 MMHG | OXYGEN SATURATION: 97 % | HEART RATE: 72 BPM | TEMPERATURE: 97.5 F | RESPIRATION RATE: 18 BRPM | SYSTOLIC BLOOD PRESSURE: 110 MMHG

## 2023-06-21 PROCEDURE — 10330064 GLOVE, EXAM VNYL MED N/S (100/BX 10BX/CS

## 2023-06-21 PROCEDURE — G0299 HHS/HOSPICE OF RN EA 15 MIN: HCPCS

## 2023-06-21 NOTE — TELEPHONE ENCOUNTER
FAXED ON 06/21/23 TO [employer] / ACCT# [id number] at [phone]  FAX CONFIRMATION RECEIVED  Form scanned into pt chart

## 2023-06-22 DIAGNOSIS — L89.324 STAGE IV PRESSURE ULCER OF LEFT BUTTOCK (HCC): ICD-10-CM

## 2023-06-22 DIAGNOSIS — G89.29 CHRONIC LOW BACK PAIN WITHOUT SCIATICA, UNSPECIFIED BACK PAIN LATERALITY: ICD-10-CM

## 2023-06-22 DIAGNOSIS — M54.50 CHRONIC LOW BACK PAIN WITHOUT SCIATICA, UNSPECIFIED BACK PAIN LATERALITY: ICD-10-CM

## 2023-06-22 DIAGNOSIS — G82.20 PARAPLEGIC SPINAL PARALYSIS (HCC): ICD-10-CM

## 2023-06-23 ENCOUNTER — TELEPHONE (OUTPATIENT)
Dept: HOME HEALTH SERVICES | Facility: HOME HEALTHCARE | Age: 63
End: 2023-06-23

## 2023-06-23 ENCOUNTER — HOME CARE VISIT (OUTPATIENT)
Dept: HOME HEALTH SERVICES | Facility: HOME HEALTHCARE | Age: 63
End: 2023-06-23
Payer: MEDICARE

## 2023-06-23 VITALS
OXYGEN SATURATION: 96 % | RESPIRATION RATE: 20 BRPM | HEART RATE: 78 BPM | TEMPERATURE: 97.8 F | SYSTOLIC BLOOD PRESSURE: 100 MMHG | DIASTOLIC BLOOD PRESSURE: 60 MMHG

## 2023-06-23 NOTE — TELEPHONE ENCOUNTER
PT LVM ASKING THE STATUS OF HIS SUPPLIES, I CALLED BACK THE PT AND I ADVISED THE FORMS WAS FAXED AND THE CONFIRMATION IS RECEIVED, PT UNDERSTOOD

## 2023-06-26 ENCOUNTER — HOME CARE VISIT (OUTPATIENT)
Dept: HOME HEALTH SERVICES | Facility: HOME HEALTHCARE | Age: 63
End: 2023-06-26
Payer: MEDICARE

## 2023-06-26 DIAGNOSIS — F11.20 CONTINUOUS OPIOID DEPENDENCE (HCC): Primary | ICD-10-CM

## 2023-06-26 PROCEDURE — G0299 HHS/HOSPICE OF RN EA 15 MIN: HCPCS

## 2023-06-26 RX ORDER — NALOXONE HYDROCHLORIDE 4 MG/.1ML
SPRAY NASAL
Qty: 1 EACH | Refills: 1 | Status: SHIPPED | OUTPATIENT
Start: 2023-06-26 | End: 2024-06-25

## 2023-06-26 RX ORDER — OXYCODONE HYDROCHLORIDE 15 MG/1
15 TABLET ORAL EVERY 6 HOURS PRN
Qty: 120 TABLET | Refills: 0 | Status: SHIPPED | OUTPATIENT
Start: 2023-06-26

## 2023-06-28 ENCOUNTER — HOME CARE VISIT (OUTPATIENT)
Dept: HOME HEALTH SERVICES | Facility: HOME HEALTHCARE | Age: 63
End: 2023-06-28
Payer: MEDICARE

## 2023-06-28 VITALS
RESPIRATION RATE: 18 BRPM | HEART RATE: 76 BPM | TEMPERATURE: 97.4 F | OXYGEN SATURATION: 98 % | SYSTOLIC BLOOD PRESSURE: 110 MMHG | DIASTOLIC BLOOD PRESSURE: 60 MMHG

## 2023-06-28 PROCEDURE — G0299 HHS/HOSPICE OF RN EA 15 MIN: HCPCS

## 2023-06-30 ENCOUNTER — HOME CARE VISIT (OUTPATIENT)
Dept: HOME HEALTH SERVICES | Facility: HOME HEALTHCARE | Age: 63
End: 2023-06-30
Payer: MEDICARE

## 2023-06-30 PROCEDURE — G0299 HHS/HOSPICE OF RN EA 15 MIN: HCPCS

## 2023-07-03 ENCOUNTER — HOME CARE VISIT (OUTPATIENT)
Dept: HOME HEALTH SERVICES | Facility: HOME HEALTHCARE | Age: 63
End: 2023-07-03
Payer: MEDICARE

## 2023-07-03 VITALS
SYSTOLIC BLOOD PRESSURE: 100 MMHG | DIASTOLIC BLOOD PRESSURE: 60 MMHG | HEART RATE: 76 BPM | OXYGEN SATURATION: 97 % | RESPIRATION RATE: 18 BRPM | TEMPERATURE: 97.5 F

## 2023-07-03 VITALS
SYSTOLIC BLOOD PRESSURE: 110 MMHG | TEMPERATURE: 97.3 F | HEART RATE: 78 BPM | DIASTOLIC BLOOD PRESSURE: 60 MMHG | OXYGEN SATURATION: 97 % | RESPIRATION RATE: 18 BRPM

## 2023-07-03 PROCEDURE — 10330064 PAD, ABD 5X9" STR LF (1/PK 20PK/BX) MGM1

## 2023-07-03 PROCEDURE — 10330064 DRESSING, ALLEVYN BRDR LT 4X4"(10/BX 6BX

## 2023-07-03 PROCEDURE — 10330064 TAPE, RETENTION 2"X10YDS (1/BX24BX/CS)

## 2023-07-04 NOTE — CASE COMMUNICATION
Patient called SN prior SNV that he have MD appt today and wife to performed wound care instead.  Pt refused SNV today

## 2023-07-05 ENCOUNTER — HOME CARE VISIT (OUTPATIENT)
Dept: HOME HEALTH SERVICES | Facility: HOME HEALTHCARE | Age: 63
End: 2023-07-05
Payer: MEDICARE

## 2023-07-07 ENCOUNTER — HOME CARE VISIT (OUTPATIENT)
Dept: HOME HEALTH SERVICES | Facility: HOME HEALTHCARE | Age: 63
End: 2023-07-07
Payer: MEDICARE

## 2023-07-07 VITALS
RESPIRATION RATE: 20 BRPM | OXYGEN SATURATION: 99 % | HEART RATE: 80 BPM | SYSTOLIC BLOOD PRESSURE: 112 MMHG | TEMPERATURE: 97.3 F | DIASTOLIC BLOOD PRESSURE: 70 MMHG

## 2023-07-07 PROCEDURE — G0299 HHS/HOSPICE OF RN EA 15 MIN: HCPCS

## 2023-07-10 ENCOUNTER — HOME CARE VISIT (OUTPATIENT)
Dept: HOME HEALTH SERVICES | Facility: HOME HEALTHCARE | Age: 63
End: 2023-07-10
Payer: MEDICARE

## 2023-07-10 DIAGNOSIS — M54.50 CHRONIC LOW BACK PAIN WITHOUT SCIATICA, UNSPECIFIED BACK PAIN LATERALITY: ICD-10-CM

## 2023-07-10 DIAGNOSIS — G89.29 CHRONIC LOW BACK PAIN WITHOUT SCIATICA, UNSPECIFIED BACK PAIN LATERALITY: ICD-10-CM

## 2023-07-10 PROCEDURE — G0299 HHS/HOSPICE OF RN EA 15 MIN: HCPCS

## 2023-07-11 RX ORDER — IBUPROFEN 800 MG/1
TABLET ORAL
Qty: 60 TABLET | Refills: 0 | Status: SHIPPED | OUTPATIENT
Start: 2023-07-11

## 2023-07-12 ENCOUNTER — HOME CARE VISIT (OUTPATIENT)
Dept: HOME HEALTH SERVICES | Facility: HOME HEALTHCARE | Age: 63
End: 2023-07-12
Payer: MEDICARE

## 2023-07-12 VITALS
HEART RATE: 76 BPM | TEMPERATURE: 97.2 F | SYSTOLIC BLOOD PRESSURE: 110 MMHG | OXYGEN SATURATION: 97 % | DIASTOLIC BLOOD PRESSURE: 60 MMHG | RESPIRATION RATE: 18 BRPM

## 2023-07-12 VITALS
TEMPERATURE: 96.9 F | HEART RATE: 82 BPM | SYSTOLIC BLOOD PRESSURE: 106 MMHG | DIASTOLIC BLOOD PRESSURE: 70 MMHG | OXYGEN SATURATION: 99 %

## 2023-07-12 PROCEDURE — G0299 HHS/HOSPICE OF RN EA 15 MIN: HCPCS

## 2023-07-14 ENCOUNTER — HOME CARE VISIT (OUTPATIENT)
Dept: HOME HEALTH SERVICES | Facility: HOME HEALTHCARE | Age: 63
End: 2023-07-14
Payer: MEDICARE

## 2023-07-14 ENCOUNTER — OFFICE VISIT (OUTPATIENT)
Dept: WOUND CARE | Facility: CLINIC | Age: 63
End: 2023-07-14
Payer: MEDICARE

## 2023-07-14 DIAGNOSIS — L89.134 STAGE IV PRESSURE ULCER OF RIGHT LOWER BACK (HCC): ICD-10-CM

## 2023-07-14 DIAGNOSIS — L89.223 STAGE III PRESSURE ULCER OF LEFT HIP (HCC): ICD-10-CM

## 2023-07-14 DIAGNOSIS — E11.9 TYPE 2 DIABETES MELLITUS WITHOUT COMPLICATION, WITHOUT LONG-TERM CURRENT USE OF INSULIN (HCC): ICD-10-CM

## 2023-07-14 DIAGNOSIS — G82.20 PARAPLEGIC SPINAL PARALYSIS (HCC): ICD-10-CM

## 2023-07-14 DIAGNOSIS — L89.154 STAGE IV PRESSURE ULCER OF SACRAL REGION (HCC): Primary | ICD-10-CM

## 2023-07-14 DIAGNOSIS — L89.324 LEFT PERINEAL ISCHIAL PRESSURE ULCER, STAGE IV (HCC): ICD-10-CM

## 2023-07-14 PROCEDURE — 11042 DBRDMT SUBQ TIS 1ST 20SQCM/<: CPT | Performed by: SURGERY

## 2023-07-14 NOTE — ASSESSMENT & PLAN NOTE
All the wounds are about the same or slightly better. The left ischial buttock wound still undermines towards the perineal wound connects that wound and both together have a large undermining. The base is debrided with a curette. We will continue the same care.

## 2023-07-14 NOTE — PATIENT INSTRUCTIONS
Orders Placed This Encounter   Procedures    Wound cleansing and dressings     Wounds Left Hip   Cleanse/Irrigate wound with normal saline. Apply a piece of aquacel rope to wound bed. Cover with allevyn bordered foam to hip wound   Change three times a week or as needed for drainage. R lateral back:   Gently pack with aquacel AG rope. DO NOT SUBSTITUE Tape the tail. Cover with ABD   Secure with tape. Change daily or as needed for drainage. Perineum and Left Buttock Wound:   Skin prep to periwound. Gently pack with aquacel ag rope. DO NOT SUBSTITUE Extend into left buttock and Tape tail. Cover with allevyn life. Change three times a week or as needed for drainage. You need to keep pressure off of right back wound. HOME CARE   Continue home care services/skilled nursing - 3 x per week (family to do in between),  Silver nitrate used at visit to perineum wound. Area may appear gray/black for a few days with increased drainage. OFF-LOADING   Avoid pressure at wound site. - all wounds, including right back   Wheelchair Cushion. - ROHO cushion   Do Not Sit for Long Periods of Time. - 1 hr max for meals only, otherwise in bed and turn side to side at least   every 3 hours or more frequently   Reposition in regular bed for now at least every 1-2 hours while awake. Follow up in 4 weeks with Dr. Martha Murphy treatment note: Cleansed with NSS and dressed as above.      Standing Status:   Future     Standing Expiration Date:   7/14/2024

## 2023-07-14 NOTE — PROGRESS NOTES
Patient ID: Rosalynn Essex is a 59 y.o. male Date of Birth 1960     Chief Complaint  No chief complaint on file. Allergies  Patient has no known allergies. Assessment:    Left perineal ischial pressure ulcer, stage IV (HCC)  All the wounds are about the same or slightly better. The left ischial buttock wound still undermines towards the perineal wound connects that wound and both together have a large undermining. The base is debrided with a curette. We will continue the same care. Stage IV pressure ulcer of right lower back ([address])  The wounds about the same. All nonviable and slough was debrided. Continue the same care. 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)  -     Wound cleansing and dressings; Future    Left perineal ischial pressure ulcer, stage IV (HCC)  -     Wound cleansing and dressings; Future    Type 2 diabetes mellitus without complication, without long-term current use of insulin ([address])    Paraplegic spinal paralysis ([address])    Other orders  -     Debridement  -     Debridement              Debridement   Wound 08/26/20 Pressure Injury Buttocks Left    Universal Protocol:  Consent given by: patient  Time out: Immediately prior to procedure a "time out" was called to verify the correct patient, procedure, equipment, support staff and site/side marked as required.     Performed by: physician  Debridement type: surgical  Level of debridement: subcutaneous tissue  Pain control: lidocaine 4%  Post-debridement measurements  Length (cm): 1.9  Width (cm): 3  Depth (cm): 1.5  Percent debrided: 100%  Surface Area (cm^2): 5.7  Area debrided (cm^2): 5.7  Volume (cm^3): 8.55  Tissue and other material debrided: subcutaneous tissue  Devitalized tissue debrided: biofilm and slough  Instrument(s) utilized: curette  Procedural pain (0-10): insensate  Post-procedural pain: insensate   Response to treatment: procedure was tolerated well    Debridement   Wound 07/29/21 Pressure Injury Back Right; Lower; Lateral    Universal Protocol:  Consent given by: patient  Time out: Immediately prior to procedure a "time out" was called to verify the correct patient, procedure, equipment, support staff and site/side marked as required. Performed by: physician  Debridement type: surgical  Level of debridement: subcutaneous tissue  Pain control: lidocaine 4%  Post-debridement measurements  Length (cm): 1.2  Width (cm): 3.5  Depth (cm): 3  Percent debrided: 100%  Surface Area (cm^2): 4.2  Area debrided (cm^2): 4.2  Volume (cm^3): 12.6  Tissue and other material debrided: subcutaneous tissue  Devitalized tissue debrided: biofilm and slough  Instrument(s) utilized: curette  Procedural pain (0-10): insensate  Post-procedural pain: insensate   Response to treatment: procedure was tolerated well          Plan:     Wound 08/26/20 Pressure Injury Buttocks Left (Active)   Wound Image Images linked 07/14/23 1032   Wound Description Pink;Probes to bone 07/14/23 1007   Shelby-wound Assessment Scar Tissue; Maceration 07/14/23 1007   Wound Length (cm) 1.9 cm 07/14/23 1007   Wound Width (cm) 3 cm 07/14/23 1007   Wound Depth (cm) 1.4 cm 07/14/23 1007   Wound Surface Area (cm^2) 5.7 cm^2 07/14/23 1007   Wound Volume (cm^3) 7.98 cm^3 07/14/23 1007   Calculated Wound Volume (cm^3) 7.98 cm^3 07/14/23 1007   Change in Wound Size % 72.29 07/14/23 1007   Tunneling 4 cm 07/14/23 1007   Tunneling in depth located at 11 oc'clock tunnel, Wound communicates with perenium wound at 3 o'clock 07/14/23 1007   Undermining is depth extending from Wound communicates with perenium wound at 3 o'clock 07/14/23 1007   Drainage Amount Moderate 07/14/23 1007   Drainage Description Yellow; Tan 07/14/23 1007   Non-staged Wound Description Full thickness 07/14/23 1007   Treatments Irrigation with NSS 07/14/23 1007   Patient Tolerance Tolerated well 07/14/23 1007   Dressing Status Removed 07/14/23 1007       Wound 07/29/21 Pressure Injury Back Right; Lower; Lateral (Active)   Wound Image Images linked 07/14/23 1033   Wound Length (cm) 1.2 cm 07/14/23 0954   Wound Width (cm) 3.5 cm 07/14/23 0954   Wound Depth (cm) 3 cm 07/14/23 0954   Wound Surface Area (cm^2) 4.2 cm^2 07/14/23 0954   Wound Volume (cm^3) 12.6 cm^3 07/14/23 0954   Calculated Wound Volume (cm^3) 12.6 cm^3 07/14/23 0954   Change in Wound Size % 27.42 07/14/23 0954   Tunneling 4.1 cm 07/14/23 0954   Tunneling in depth located at 11 o'clock 07/14/23 0954   Undermining 0 07/14/23 0954   Undermining is depth extending from resolved 07/14/23 0954   Drainage Amount Large 07/14/23 0954   Drainage Description Alva;Yellow 07/14/23 0954   Non-staged Wound Description Full thickness 07/14/23 0954   Treatments Irrigation with NSS 07/14/23 0954   Patient Tolerance Tolerated well 07/14/23 0954   Dressing Status Removed 07/14/23 0954       Wound 11/05/21 Pressure Injury Perineum (Active)   Wound Image Images linked 07/14/23 1004   Wound Description Pink;Yellow 07/14/23 1004   Shelby-wound Assessment Intact;Scar Tissue 07/14/23 1004   Wound Length (cm) 1.8 cm 07/14/23 1004   Wound Width (cm) 1 cm 07/14/23 1004   Wound Depth (cm) 1.2 cm 07/14/23 1004   Wound Surface Area (cm^2) 1.8 cm^2 07/14/23 1004   Wound Volume (cm^3) 2.16 cm^3 07/14/23 1004   Calculated Wound Volume (cm^3) 2.16 cm^3 07/14/23 1004   Change in Wound Size % -2060 07/14/23 1004   Tunneling in depth located at Wound communicates to left buttock at 9 o'clock 07/14/23 1004   Undermining 4.3 07/14/23 1004   Undermining is depth extending from Circumfrential 12-12, deepesr 2 o'clock 07/14/23 1004   Drainage Amount Moderate 07/14/23 1004   Drainage Description Alva;Yellow 07/14/23 1004   Non-staged Wound Description Full thickness 07/14/23 1004   Treatments Irrigation with NSS 07/14/23 1004   Patient Tolerance Tolerated well 07/14/23 1004   Dressing Status Removed 07/14/23 1004       Wound 03/17/23 Pressure Injury Hip Lateral;Left;Proximal (Active)   Wound Image Images linked 07/14/23 0959   Wound Description Pink;Epithelialization; Beefy red 07/14/23 0959   Shelby-wound Assessment Scar Tissue; Intact 07/14/23 0959   Wound Length (cm) 0.9 cm 07/14/23 0959   Wound Width (cm) 3.5 cm 07/14/23 0959   Wound Depth (cm) 0.1 cm 07/14/23 0959   Wound Surface Area (cm^2) 3.15 cm^2 07/14/23 0959   Wound Volume (cm^3) 0.315 cm^3 07/14/23 0959   Calculated Wound Volume (cm^3) 0.32 cm^3 07/14/23 0959   Change in Wound Size % -113.33 07/14/23 0959   Drainage Amount Moderate 07/14/23 0959   Drainage Description Yellow; Tan 07/14/23 0959   Non-staged Wound Description Full thickness 07/14/23 0959   Treatments Irrigation with NSS 07/14/23 0959   Patient Tolerance Tolerated well 07/14/23 0959   Dressing Status Removed 07/14/23 0959       Wound 06/19/23 Pressure Injury Hip Left;Medial (Active)   Wound Image Images linked 07/14/23 0958   Wound Description Epithelialization;Pink 07/14/23 0959   Shelby-wound Assessment Maceration 07/14/23 0959   Wound Length (cm) 1 cm 07/14/23 0959   Wound Width (cm) 1.4 cm 07/14/23 0959   Wound Depth (cm) 0.1 cm 07/14/23 0959   Wound Surface Area (cm^2) 1.4 cm^2 07/14/23 0959   Wound Volume (cm^3) 0.14 cm^3 07/14/23 0959   Calculated Wound Volume (cm^3) 0.14 cm^3 07/14/23 0959   Change in Wound Size % -27.27 07/14/23 0959   Drainage Amount Moderate 07/14/23 0959   Drainage Description Alva;Yellow 07/14/23 0959       Wound 08/26/20 Pressure Injury Buttocks Left (Active)   Date First Assessed/Time First Assessed: 08/26/20 1056   Primary Wound Type: Pressure Injury  Location: Buttocks  Wound Location Orientation: Left  Wound Outcome: Palliative       Wound 08/26/20 Pressure Injury Hip Left (Active)   Date First Assessed/Time First Assessed: 08/26/20 1057   Primary Wound Type: Pressure Injury  Location: Hip  Wound Location Orientation: Left  Wound Outcome: Palliative       Wound 07/29/21 Pressure Injury Back Right; Lower; Lateral (Active)   Date First Assessed: 07/29/21   Primary Wound Type: Pressure Injury  Location: Back  Wound Location Orientation: Right; Lower; Lateral  Wound Outcome: Palliative       Wound 11/05/21 Pressure Injury Perineum (Active)   Date First Assessed/Time First Assessed: 11/05/21 1059   Primary Wound Type: Pressure Injury  Location: Perineum  Wound Outcome: Palliative       Wound 03/17/23 Pressure Injury Hip Lateral;Left;Proximal (Active)   Date First Assessed/Time First Assessed: 03/17/23 1039   Primary Wound Type: Pressure Injury  Location: Hip  Wound Location Orientation: Lateral;Left;Proximal       Wound 06/19/23 Pressure Injury Hip Left;Medial (Active)   Date First Assessed: 06/19/23   Present on Original Admission: No  Primary Wound Type: Pressure Injury  Location: Hip  Wound Location Orientation: Left;Medial  Wound Description (Comments): granulation  yellow serous drainage   Dressing Status: (c)        [REMOVED] Wound 08/26/19 Buttocks Left;Distal;Lateral (Removed)   Resolved Date/Resolved Time: 08/26/20 1056  Date First Assessed/Time First Assessed: 08/26/19 1130   Pre-Existing Wound: Yes  Location: Buttocks  Wound Location Orientation: Left;Distal;Lateral  Wound Description (Comments): Deep ischial wound       [REMOVED] Wound 09/16/19 Buttocks Right;Distal (Removed)   Resolved Date/Resolved Time: 08/26/20 1055  Date First Assessed/Time First Assessed: 09/16/19 1657   Pre-Existing Wound: Yes  Location: Buttocks  Wound Location Orientation: Right;Distal       [REMOVED] Wound 10/28/19 Knee Left (Removed)   Resolved Date/Resolved Time: 08/26/20 1055  Date First Assessed/Time First Assessed: 10/28/19 0657   Pre-Existing Wound: Yes  Location: Knee  Wound Location Orientation: Left  Wound Description (Comments): round black Dressing Status: Open to air       [REMOVED] Wound 10/28/19 Buttocks Left;Mid (Removed)   Resolved Date/Resolved Time: 08/26/20 1056  Date First Assessed/Time First Assessed: 10/28/19 1108   Pre-Existing Wound: Yes  Location: Buttocks  Wound Location Orientation: Left;Mid  Wound Description (Comments): Stage 2 vs traumatic injury       [REMOVED] Wound 11/01/19 Other (Comment) N/A (Removed)   Resolved Date/Resolved Time: 08/26/20 1055  Date First Assessed/Time First Assessed: 11/01/19 1640   Location: Other (Comment)  Wound Location Orientation: N/A  Wound Description (Comments): scrotum dressed with exofin       [REMOVED] Wound 05/27/22 Skin Tear Buttocks Left;Proximal (Removed)   Resolved Date/Resolved Time: 08/05/22 0851  Date First Assessed/Time First Assessed: 05/27/22 0946   Primary Wound Type: Skin Tear  Location: Buttocks  Wound Location Orientation: Left;Proximal  Wound Outcome: Healed       Subjective:      . Mr. Chantal Cordero is a 46-year-old gentleman with paraplegia and history of multiple pressure wounds with 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. He had been rescheduled for a debridement and flap closure by plastics in August but developed a new wound on his right mid to lower back chest wall. This wound probes deep and has been closed on the outside but the tract has not been improving. He had a CT scan that shows a deep tracking to the muscle but no fistulization to the internal thoracic cavity. 02/04/2022 he returns now for re-evaluation. He still has had visiting nurses but has not been seen in the  [facility] since November. He denies any change or complaint    03/11/2022 no changes since been seen last.  No new complaints. 04/22/2022 no issues. No changes. 05/27/2022. Doing well. Denies fevers or chills. No issues spoke about plastics a re-evaluation but he wants to wait for COVID to wane more and maybe next fall    07/22/2022.   He has not been seen here since May mostly due to transportation issues. He has significant more pain and drainage on his right back chest wall wound. Otherwise no changes    08/05/2022 denies fevers or chills. Still in pain on the right side. 3/17/2023 he returns for evaluation. He was seen in the wound center by another provider month or 2 ago. 6/9/2023. Here for long-term follow-up. No significant changes. 7/14/2023. No changes. Doing well. The following portions of the patient's history were reviewed and updated as appropriate: allergies, current medications, past family history, past medical history, past social history, past surgical history and problem list.    Review of Systems   Constitutional: Negative for chills and fever. HENT: Negative for ear pain and sore throat. Eyes: Negative for pain and visual disturbance. Respiratory: Negative for cough and shortness of breath. Cardiovascular: Negative for chest pain and palpitations. Gastrointestinal: Negative for abdominal pain and vomiting. Skin: Negative for color change and rash. Neurological: Positive for weakness and numbness. Psychiatric/Behavioral: Negative for agitation and behavioral problems. All other systems reviewed and are negative. Objective:       Wound 08/26/20 Pressure Injury Buttocks Left (Active)   Wound Image Images linked 07/14/23 1032   Wound Description Pink;Probes to bone 07/14/23 1007   Shelby-wound Assessment Scar Tissue; Maceration 07/14/23 1007   Wound Length (cm) 1.9 cm 07/14/23 1007   Wound Width (cm) 3 cm 07/14/23 1007   Wound Depth (cm) 1.4 cm 07/14/23 1007   Wound Surface Area (cm^2) 5.7 cm^2 07/14/23 1007   Wound Volume (cm^3) 7.98 cm^3 07/14/23 1007   Calculated Wound Volume (cm^3) 7.98 cm^3 07/14/23 1007   Change in Wound Size % 72.29 07/14/23 1007   Tunneling 4 cm 07/14/23 1007   Tunneling in depth located at 11 oc'clock tunnel, Wound communicates with perenium wound at 3 o'clock 07/14/23 1007   Undermining is depth extending from Wound communicates with perenium wound at 3 o'clock 07/14/23 1007   Drainage Amount Moderate 07/14/23 1007   Drainage Description Yellow; Tan 07/14/23 1007   Non-staged Wound Description Full thickness 07/14/23 1007   Treatments Irrigation with NSS 07/14/23 1007   Patient Tolerance Tolerated well 07/14/23 1007   Dressing Status Removed 07/14/23 1007       Wound 07/29/21 Pressure Injury Back Right; Lower; Lateral (Active)   Wound Image Images linked 07/14/23 1033   Wound Length (cm) 1.2 cm 07/14/23 0954   Wound Width (cm) 3.5 cm 07/14/23 0954   Wound Depth (cm) 3 cm 07/14/23 0954   Wound Surface Area (cm^2) 4.2 cm^2 07/14/23 0954   Wound Volume (cm^3) 12.6 cm^3 07/14/23 0954   Calculated Wound Volume (cm^3) 12.6 cm^3 07/14/23 0954   Change in Wound Size % 27.42 07/14/23 0954   Tunneling 4.1 cm 07/14/23 0954   Tunneling in depth located at 11 o'clock 07/14/23 0954   Undermining 0 07/14/23 0954   Undermining is depth extending from resolved 07/14/23 0954   Drainage Amount Large 07/14/23 0954   Drainage Description Alva;Yellow 07/14/23 0954   Non-staged Wound Description Full thickness 07/14/23 0954   Treatments Irrigation with NSS 07/14/23 0954   Patient Tolerance Tolerated well 07/14/23 0954   Dressing Status Removed 07/14/23 0954       Wound 11/05/21 Pressure Injury Perineum (Active)   Wound Image Images linked 07/14/23 1004   Wound Description Pink;Yellow 07/14/23 1004   Shelby-wound Assessment Intact;Scar Tissue 07/14/23 1004   Wound Length (cm) 1.8 cm 07/14/23 1004   Wound Width (cm) 1 cm 07/14/23 1004   Wound Depth (cm) 1.2 cm 07/14/23 1004   Wound Surface Area (cm^2) 1.8 cm^2 07/14/23 1004   Wound Volume (cm^3) 2.16 cm^3 07/14/23 1004   Calculated Wound Volume (cm^3) 2.16 cm^3 07/14/23 1004   Change in Wound Size % -2060 07/14/23 1004   Tunneling in depth located at Wound communicates to left buttock at 9 o'clock 07/14/23 1004   Undermining 4.3 07/14/23 1004   Undermining is depth extending from Circumfrential 12-12, deepesr 2 o'clock 07/14/23 1004   Drainage Amount Moderate 07/14/23 1004   Drainage Description Alva;Yellow 07/14/23 1004   Non-staged Wound Description Full thickness 07/14/23 1004   Treatments Irrigation with NSS 07/14/23 1004   Patient Tolerance Tolerated well 07/14/23 1004   Dressing Status Removed 07/14/23 1004       Wound 03/17/23 Pressure Injury Hip Lateral;Left;Proximal (Active)   Wound Image Images linked 07/14/23 0959   Wound Description Pink;Epithelialization; Beefy red 07/14/23 0959   Shelby-wound Assessment Scar Tissue; Intact 07/14/23 0959   Wound Length (cm) 0.9 cm 07/14/23 0959   Wound Width (cm) 3.5 cm 07/14/23 0959   Wound Depth (cm) 0.1 cm 07/14/23 0959   Wound Surface Area (cm^2) 3.15 cm^2 07/14/23 0959   Wound Volume (cm^3) 0.315 cm^3 07/14/23 0959   Calculated Wound Volume (cm^3) 0.32 cm^3 07/14/23 0959   Change in Wound Size % -113.33 07/14/23 0959   Drainage Amount Moderate 07/14/23 0959   Drainage Description Yellow; Tan 07/14/23 0959   Non-staged Wound Description Full thickness 07/14/23 0959   Treatments Irrigation with NSS 07/14/23 0959   Patient Tolerance Tolerated well 07/14/23 0959   Dressing Status Removed 07/14/23 0959       Wound 06/19/23 Pressure Injury Hip Left;Medial (Active)   Wound Image Images linked 07/14/23 0958   Wound Description Epithelialization;Pink 07/14/23 0959   Shelby-wound Assessment Maceration 07/14/23 0959   Wound Length (cm) 1 cm 07/14/23 0959   Wound Width (cm) 1.4 cm 07/14/23 0959   Wound Depth (cm) 0.1 cm 07/14/23 0959   Wound Surface Area (cm^2) 1.4 cm^2 07/14/23 0959   Wound Volume (cm^3) 0.14 cm^3 07/14/23 0959   Calculated Wound Volume (cm^3) 0.14 cm^3 07/14/23 0959   Change in Wound Size % -27.27 07/14/23 0959   Drainage Amount Moderate 07/14/23 0959   Drainage Description Alva;Yellow 07/14/23 0959       There were no vitals taken for this visit. Physical Exam  Vitals and nursing note reviewed. Exam conducted with a chaperone present. Constitutional:       Appearance: Normal appearance. Cardiovascular:      Rate and Rhythm: Normal rate and regular rhythm. Pulmonary:      Effort: Pulmonary effort is normal.      Breath sounds: Normal breath sounds. Abdominal:      General: There is no distension. Palpations: Abdomen is soft. There is no mass. Tenderness: There is no abdominal tenderness. Comments: Ostomy   Musculoskeletal:      Comments: Paraplegia. Right hip disarticulation and removal   Skin:     General: Skin is warm and dry. Comments: See complete wound assessment   Neurological:      Mental Status: He is alert. Psychiatric:         Mood and Affect: Mood normal.         Behavior: Behavior normal.           Wound Instructions:  Orders Placed This Encounter   Procedures   • Wound cleansing and dressings     Wounds Left Hip   Cleanse/Irrigate wound with normal saline. Apply a piece of aquacel rope to wound bed. Cover with allevyn bordered foam to hip wound   Change three times a week or as needed for drainage.       R lateral back:   Gently pack with aquacel AG rope.  DO NOT SUBSTITUE Tape the tail. Cover with ABD   Secure with tape. Change daily or as needed for drainage.       Perineum and Left Buttock Wound:   Skin prep to periwound. Gently pack with aquacel ag rope. DO NOT SUBSTITUE Extend into left buttock and Tape tail. Cover with allevyn life. Change three times a week or as needed for drainage.       You need to keep pressure off of right back wound.           HOME CARE   Continue home care services/skilled nursing - 3 x per week (family to do in between),  Silver nitrate used at visit to perineum wound. [place]pe [place] may appear garcia/black for a few days with increased drainage.       OFF-LOADING   Avoid pressure at wound site. - all wounds, including right back   Wheelchair Cushion. - ROHO cushion   Do Not Sit for Long Periods of Time.  - 1 hr max for meals only, otherwise in bed and turn side to side at least   every 3 hours or more frequently   Reposition in regular bed for now at least every 1-2 hours while awake.       Follow up in 4 weeks with Dr. Gerhard Hays treatment note: Cleansed with NSS and dressed as above. Standing Status:   Future     Standing Expiration Date:   7/14/2024   • Debridement     This order was created via procedure documentation   • Debridement     This order was created via procedure documentation        Diagnosis ICD-10-CM Associated Orders   1. Stage IV pressure ulcer of sacral region [facility])  L89.154 Wound cleansing and dressings      2. Stage IV pressure ulcer of right lower back ([facility])  L89.134 Wound cleansing and dressings      3. 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 ([facility])  Y14.960 Wound cleansing and dressings      4. Left perineal ischial pressure ulcer, stage IV ([facility])  L89.324 Wound cleansing and dressings      5. Type 2 diabetes mellitus without complication, without long-term current use of insulin ([facility])  E11.9       6.  Paraplegic spinal paralysis ([address])  G82.20

## 2023-07-17 ENCOUNTER — HOME CARE VISIT (OUTPATIENT)
Dept: HOME HEALTH SERVICES | Facility: HOME HEALTHCARE | Age: 63
End: 2023-07-17
Payer: MEDICARE

## 2023-07-17 PROCEDURE — G0299 HHS/HOSPICE OF RN EA 15 MIN: HCPCS

## 2023-07-18 ENCOUNTER — HOME CARE VISIT (OUTPATIENT)
Dept: HOME HEALTH SERVICES | Facility: HOME HEALTHCARE | Age: 63
End: 2023-07-18
Payer: MEDICARE

## 2023-07-19 ENCOUNTER — HOME CARE VISIT (OUTPATIENT)
Dept: HOME HEALTH SERVICES | Facility: HOME HEALTHCARE | Age: 63
End: 2023-07-19
Payer: MEDICARE

## 2023-07-19 PROCEDURE — G0299 HHS/HOSPICE OF RN EA 15 MIN: HCPCS

## 2023-07-20 ENCOUNTER — TELEPHONE (OUTPATIENT)
Dept: HOME HEALTH SERVICES | Facility: HOME HEALTHCARE | Age: 63
End: 2023-07-20

## 2023-07-20 DIAGNOSIS — M54.50 CHRONIC LOW BACK PAIN WITHOUT SCIATICA, UNSPECIFIED BACK PAIN LATERALITY: ICD-10-CM

## 2023-07-20 DIAGNOSIS — G82.20 PARAPLEGIC SPINAL PARALYSIS (HCC): ICD-10-CM

## 2023-07-20 DIAGNOSIS — G89.29 CHRONIC LOW BACK PAIN WITHOUT SCIATICA, UNSPECIFIED BACK PAIN LATERALITY: ICD-10-CM

## 2023-07-20 DIAGNOSIS — L89.324 STAGE IV PRESSURE ULCER OF LEFT BUTTOCK (HCC): ICD-10-CM

## 2023-07-21 ENCOUNTER — HOME CARE VISIT (OUTPATIENT)
Dept: HOME HEALTH SERVICES | Facility: HOME HEALTHCARE | Age: 63
End: 2023-07-21
Payer: MEDICARE

## 2023-07-21 VITALS
DIASTOLIC BLOOD PRESSURE: 60 MMHG | OXYGEN SATURATION: 97 % | RESPIRATION RATE: 18 BRPM | HEART RATE: 84 BPM | SYSTOLIC BLOOD PRESSURE: 100 MMHG | TEMPERATURE: 97.9 F

## 2023-07-24 ENCOUNTER — HOME CARE VISIT (OUTPATIENT)
Dept: HOME HEALTH SERVICES | Facility: HOME HEALTHCARE | Age: 63
End: 2023-07-24
Payer: MEDICARE

## 2023-07-24 PROCEDURE — G0299 HHS/HOSPICE OF RN EA 15 MIN: HCPCS

## 2023-07-24 PROCEDURE — 400014 VN F/U

## 2023-07-24 RX ORDER — OXYCODONE HYDROCHLORIDE 15 MG/1
15 TABLET ORAL EVERY 6 HOURS PRN
Qty: 120 TABLET | Refills: 0 | Status: SHIPPED | OUTPATIENT
Start: 2023-07-24

## 2023-07-25 ENCOUNTER — TELEPHONE (OUTPATIENT)
Dept: HOME HEALTH SERVICES | Facility: HOME HEALTHCARE | Age: 63
End: 2023-07-25

## 2023-07-25 VITALS
RESPIRATION RATE: 18 BRPM | DIASTOLIC BLOOD PRESSURE: 60 MMHG | SYSTOLIC BLOOD PRESSURE: 110 MMHG | HEART RATE: 78 BPM | OXYGEN SATURATION: 97 % | TEMPERATURE: 97.4 F

## 2023-07-26 ENCOUNTER — HOME CARE VISIT (OUTPATIENT)
Dept: HOME HEALTH SERVICES | Facility: HOME HEALTHCARE | Age: 63
End: 2023-07-26
Payer: MEDICARE

## 2023-07-26 VITALS
HEART RATE: 74 BPM | OXYGEN SATURATION: 97 % | SYSTOLIC BLOOD PRESSURE: 116 MMHG | TEMPERATURE: 97.1 F | DIASTOLIC BLOOD PRESSURE: 60 MMHG | RESPIRATION RATE: 18 BRPM

## 2023-07-26 PROCEDURE — 10330064 SPONGE, GAUZE 8PLY N/S 4"X4" (200/PK 20P

## 2023-07-26 PROCEDURE — 10330064 TAPE, RETENTION 2"X10YDS (1/BX24BX/CS)

## 2023-07-26 PROCEDURE — 10330064 SYRINGE, CATH TIP FLAT STR 60CC (50/CS)

## 2023-07-26 PROCEDURE — 10330064 FOAM, ADH SIL W/BORDER SACRAL 7"X7" (10/

## 2023-07-26 PROCEDURE — 10330064 SYRINGE, LL 10CC (100/BX 12BX/CS)

## 2023-07-26 PROCEDURE — 10330064 PAD, ABD 5X9" STR LF (1/PK 20PK/BX) MGM1

## 2023-07-26 PROCEDURE — 10330064 CATH TRAY, FOLEY SYR 10CC (20/CS)

## 2023-07-26 PROCEDURE — 10330064 BAG, URINE LEG STR 1000ML (48/CS)

## 2023-07-26 PROCEDURE — 10330064 DRESSING, HYDROCELLULAR ADH STR FOAM 4"X

## 2023-07-26 PROCEDURE — 10330064 CATHETER, FOLEY 2WAY 5CC 16FR (10/BX)

## 2023-07-26 PROCEDURE — 10330064

## 2023-07-28 ENCOUNTER — HOME CARE VISIT (OUTPATIENT)
Dept: HOME HEALTH SERVICES | Facility: HOME HEALTHCARE | Age: 63
End: 2023-07-28
Payer: MEDICARE

## 2023-07-28 PROCEDURE — G0299 HHS/HOSPICE OF RN EA 15 MIN: HCPCS

## 2023-07-31 ENCOUNTER — HOME CARE VISIT (OUTPATIENT)
Dept: HOME HEALTH SERVICES | Facility: HOME HEALTHCARE | Age: 63
End: 2023-07-31
Payer: MEDICARE

## 2023-07-31 PROCEDURE — G0299 HHS/HOSPICE OF RN EA 15 MIN: HCPCS

## 2023-08-01 VITALS
DIASTOLIC BLOOD PRESSURE: 70 MMHG | HEART RATE: 76 BPM | OXYGEN SATURATION: 98 % | TEMPERATURE: 97.4 F | RESPIRATION RATE: 18 BRPM | SYSTOLIC BLOOD PRESSURE: 118 MMHG

## 2023-08-02 ENCOUNTER — HOME CARE VISIT (OUTPATIENT)
Dept: HOME HEALTH SERVICES | Facility: HOME HEALTHCARE | Age: 63
End: 2023-08-02
Payer: MEDICARE

## 2023-08-02 VITALS
RESPIRATION RATE: 18 BRPM | OXYGEN SATURATION: 97 % | TEMPERATURE: 97.4 F | DIASTOLIC BLOOD PRESSURE: 70 MMHG | SYSTOLIC BLOOD PRESSURE: 120 MMHG | HEART RATE: 88 BPM

## 2023-08-02 PROCEDURE — G0299 HHS/HOSPICE OF RN EA 15 MIN: HCPCS

## 2023-08-04 ENCOUNTER — HOME CARE VISIT (OUTPATIENT)
Dept: HOME HEALTH SERVICES | Facility: HOME HEALTHCARE | Age: 63
End: 2023-08-04
Payer: MEDICARE

## 2023-08-04 VITALS
DIASTOLIC BLOOD PRESSURE: 68 MMHG | TEMPERATURE: 97.3 F | OXYGEN SATURATION: 97 % | HEART RATE: 68 BPM | SYSTOLIC BLOOD PRESSURE: 110 MMHG | RESPIRATION RATE: 18 BRPM

## 2023-08-04 PROCEDURE — G0299 HHS/HOSPICE OF RN EA 15 MIN: HCPCS

## 2023-08-07 ENCOUNTER — HOME CARE VISIT (OUTPATIENT)
Dept: HOME HEALTH SERVICES | Facility: HOME HEALTHCARE | Age: 63
End: 2023-08-07
Payer: MEDICARE

## 2023-08-07 VITALS
SYSTOLIC BLOOD PRESSURE: 110 MMHG | DIASTOLIC BLOOD PRESSURE: 70 MMHG | OXYGEN SATURATION: 97 % | TEMPERATURE: 97.2 F | RESPIRATION RATE: 18 BRPM | HEART RATE: 82 BPM

## 2023-08-07 PROCEDURE — G0299 HHS/HOSPICE OF RN EA 15 MIN: HCPCS

## 2023-08-08 DIAGNOSIS — G89.29 CHRONIC LOW BACK PAIN WITHOUT SCIATICA, UNSPECIFIED BACK PAIN LATERALITY: ICD-10-CM

## 2023-08-08 DIAGNOSIS — E46 PROTEIN MALNUTRITION (HCC): ICD-10-CM

## 2023-08-08 DIAGNOSIS — M54.50 CHRONIC LOW BACK PAIN WITHOUT SCIATICA, UNSPECIFIED BACK PAIN LATERALITY: ICD-10-CM

## 2023-08-08 DIAGNOSIS — E11.9 TYPE 2 DIABETES MELLITUS WITHOUT COMPLICATION, WITHOUT LONG-TERM CURRENT USE OF INSULIN (HCC): ICD-10-CM

## 2023-08-08 DIAGNOSIS — K21.9 GASTROESOPHAGEAL REFLUX DISEASE: ICD-10-CM

## 2023-08-08 RX ORDER — OMEPRAZOLE 40 MG/1
40 CAPSULE, DELAYED RELEASE ORAL DAILY
Qty: 90 CAPSULE | Refills: 0 | Status: SHIPPED | OUTPATIENT
Start: 2023-08-08

## 2023-08-09 ENCOUNTER — HOME CARE VISIT (OUTPATIENT)
Dept: HOME HEALTH SERVICES | Facility: HOME HEALTHCARE | Age: 63
End: 2023-08-09
Payer: MEDICARE

## 2023-08-09 PROCEDURE — G0299 HHS/HOSPICE OF RN EA 15 MIN: HCPCS

## 2023-08-10 VITALS
OXYGEN SATURATION: 97 % | DIASTOLIC BLOOD PRESSURE: 70 MMHG | HEART RATE: 80 BPM | TEMPERATURE: 97.4 F | SYSTOLIC BLOOD PRESSURE: 120 MMHG | RESPIRATION RATE: 18 BRPM

## 2023-08-10 PROCEDURE — 10330064 TAPE, RETENTION 2"X10YDS (1/BX24BX/CS)

## 2023-08-10 PROCEDURE — 10330064 SPONGE, GAUZE 8PLY N/S 4"X4" (200/PK 20P

## 2023-08-10 PROCEDURE — 10330064 DRESSING, HYDROCELLULAR ADH STR FOAM 4"X

## 2023-08-10 PROCEDURE — 10330064 FOAM, ADH SIL W/BORDER SACRAL 7"X7" (10/

## 2023-08-10 PROCEDURE — 10330064 PAD, ABD 5X9" STR LF (1/PK 20PK/BX) MGM1

## 2023-08-10 PROCEDURE — 10330064

## 2023-08-10 RX ORDER — LANCETS
EACH MISCELLANEOUS DAILY
Qty: 100 EACH | Refills: 0 | Status: SHIPPED | OUTPATIENT
Start: 2023-08-10

## 2023-08-10 RX ORDER — IBUPROFEN 800 MG/1
800 TABLET ORAL EVERY 8 HOURS PRN
Qty: 60 TABLET | Refills: 0 | Status: SHIPPED | OUTPATIENT
Start: 2023-08-10

## 2023-08-10 RX ORDER — BLOOD SUGAR DIAGNOSTIC
1 STRIP MISCELLANEOUS DAILY
Qty: 100 EACH | Refills: 0 | Status: SHIPPED | OUTPATIENT
Start: 2023-08-10

## 2023-08-10 RX ORDER — ASPIRIN 81 MG/1
81 TABLET ORAL DAILY
Qty: 90 TABLET | Refills: 0 | Status: SHIPPED | OUTPATIENT
Start: 2023-08-10

## 2023-08-11 ENCOUNTER — HOME CARE VISIT (OUTPATIENT)
Dept: HOME HEALTH SERVICES | Facility: HOME HEALTHCARE | Age: 63
End: 2023-08-11
Payer: MEDICARE

## 2023-08-13 VITALS
TEMPERATURE: 97.1 F | SYSTOLIC BLOOD PRESSURE: 112 MMHG | OXYGEN SATURATION: 97 % | DIASTOLIC BLOOD PRESSURE: 68 MMHG | HEART RATE: 82 BPM | RESPIRATION RATE: 18 BRPM

## 2023-08-14 ENCOUNTER — HOME CARE VISIT (OUTPATIENT)
Dept: HOME HEALTH SERVICES | Facility: HOME HEALTHCARE | Age: 63
End: 2023-08-14
Payer: MEDICARE

## 2023-08-14 PROCEDURE — G0299 HHS/HOSPICE OF RN EA 15 MIN: HCPCS

## 2023-08-15 VITALS
OXYGEN SATURATION: 97 % | HEART RATE: 78 BPM | RESPIRATION RATE: 18 BRPM | TEMPERATURE: 97.9 F | SYSTOLIC BLOOD PRESSURE: 122 MMHG | DIASTOLIC BLOOD PRESSURE: 72 MMHG

## 2023-08-16 ENCOUNTER — HOME CARE VISIT (OUTPATIENT)
Dept: HOME HEALTH SERVICES | Facility: HOME HEALTHCARE | Age: 63
End: 2023-08-16
Payer: MEDICARE

## 2023-08-16 PROCEDURE — G0299 HHS/HOSPICE OF RN EA 15 MIN: HCPCS

## 2023-08-17 ENCOUNTER — OFFICE VISIT (OUTPATIENT)
Dept: FAMILY MEDICINE CLINIC | Facility: CLINIC | Age: 63
End: 2023-08-17

## 2023-08-17 VITALS
SYSTOLIC BLOOD PRESSURE: 110 MMHG | OXYGEN SATURATION: 98 % | TEMPERATURE: 98.1 F | HEART RATE: 56 BPM | DIASTOLIC BLOOD PRESSURE: 70 MMHG | RESPIRATION RATE: 16 BRPM

## 2023-08-17 DIAGNOSIS — Z93.59 SUPRAPUBIC CATHETER (HCC): ICD-10-CM

## 2023-08-17 DIAGNOSIS — N31.9 NEUROGENIC BLADDER: ICD-10-CM

## 2023-08-17 DIAGNOSIS — R82.90 FOUL SMELLING URINE: Primary | ICD-10-CM

## 2023-08-17 NOTE — PROGRESS NOTES
Name: Crow Mccabe      : 1960      MRN: [id number]  Encounter Provider: Maria Luisa Valiente MD  Encounter Date: 2023   Encounter department: [address] [facility],[address]     1. Foul smelling urine  Assessment & Plan:  Concerning for possible infection in pt with suprapubic catheter. VS and physical otherwise appropriate. Will order UA for evaluation. Pt would like to get testing done at home given he is unable to give sample in office. Orders:  -     UA w Reflex to Microscopic w Reflex to Culture -Lab Collect; Future; Expected date: 2023    2. Neurogenic bladder  Assessment & Plan:  Continue with suprapubic catheter with monthly changes. Continue home nurse care and urology evaluations. 3. Suprapubic catheter (HCC)  -     UA w Reflex to Microscopic w Reflex to Culture -Lab Collect; Future; Expected date: 2023         Subjective      64yo male presenting to clinic for evaluation of strong smelling urine. Pt has catheter in place that has been in place for the past 4-5 years that is changed monthly. Has noted darkening of urine and stronger smell from urine bag. Also notes bag now changes color with urine to a dark purple. Does not endorse any changes in diet, medication, or fluid intake. Unknown if medical supplies have been changed in respect to . Denying fever, chills, hematuria, nausea, vomiting, or change in stool. Review of Systems   Constitutional: Negative for appetite change, chills and fever. Respiratory: Negative for cough and shortness of breath. Cardiovascular: Negative for chest pain. Gastrointestinal: Negative for abdominal pain, blood in stool, constipation, diarrhea, nausea and vomiting. Genitourinary: Negative for dysuria and hematuria.        Current Outpatient Medications on File Prior to Visit   Medication Sig   • omeprazole (PriLOSEC) 40 MG capsule Take 1 capsule (40 mg total) by mouth daily   • Accu-Chek FastClix Lancets MISC Inject under the skin daily   • acetaminophen (TYLENOL) 325 mg tablet Take 2 tablets (650 mg total) by mouth every 6 (six) hours as needed for mild pain, headaches or fever (Patient not taking: Reported on 9/2/2022)   • aspirin (RA Aspirin EC) 81 mg EC tablet Take 1 tablet (81 mg total) by mouth daily   • Blood Glucose Monitoring Suppl (ONE TOUCH ULTRA 2) w/Device KIT Use daily   • Disposable Gloves (LATEX GLOVES LARGE) MISC Use as needed up to 3 times a day dispo 2 boxes   • ergocalciferol (VITAMIN D2) 50,000 units Take 1 capsule (50,000 Units total) by mouth once a week (Patient not taking: Reported on 9/2/2022)   • glucose blood (Accu-Chek Guide) test strip Use 1 each daily Use as instructed   • ibuprofen (MOTRIN) 800 mg tablet Take 1 tablet (800 mg total) by mouth every 8 (eight) hours as needed for mild pain   • Incontinence Supply Disposable (SUNBEAM INCONTINENT UNDER PAD) MISC Use 1 per week, dispense 4 reusable underpads   • Incontinence Supply Disposable MISC by Does not apply route 2 (two) times a day   • naloxone (NARCAN) 4 mg/0.1 mL nasal spray Administer 1 spray into a nostril. If no response after 2-3 minutes, give another dose in the other nostril using a new spray. • Nutritional Supplements (Glucerna Shake) LIQD Take 3 Cans by mouth 3 (three) times a day   • oxyCODONE (Roxicodone) 15 mg immediate release tablet Take 1 tablet (15 mg total) by mouth every 6 (six) hours as needed for moderate pain Max Daily Amount: 60 mg   • saccharomyces boulardii (FLORASTOR) 250 mg capsule Take 1 capsule (250 mg total) by mouth 2 (two) times a day   • sodium hypochlorite (DAKIN'S HALF-STRENGTH) external solution Soak gauze and pack into wounds with each dressing change as directed.        Objective     /70 (BP Location: Left arm, Patient Position: Sitting, Cuff Size: Standard)   Pulse 56   Temp 98.1 °F (36.7 °C) (Temporal)   Resp 16   SpO2 98%     Physical Exam  Vitals (Pt in wheelchair) and nursing note reviewed. Constitutional:       General: He is not in acute distress. Appearance: He is not ill-appearing, toxic-appearing or diaphoretic. 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 Cardiovascular:      Rate and Rhythm: Normal rate and regular rhythm. Heart sounds: Normal heart sounds. Pulmonary:      Effort: Pulmonary effort is normal.      Breath sounds: Normal breath sounds. Abdominal:      General: There is no distension. Palpations: Abdomen is soft. Tenderness: There is no abdominal tenderness. There is no right CVA tenderness, left CVA tenderness, guarding or rebound. Skin:     General: Skin is warm. Capillary Refill: Capillary refill takes less than 2 seconds. Neurological:      Mental Status: He is alert.       Comments: Right arm contracture   Psychiatric:         Mood and Affect: Mood normal.         Behavior: Behavior normal.       Landon Jensen MD

## 2023-08-18 ENCOUNTER — HOME CARE VISIT (OUTPATIENT)
Dept: HOME HEALTH SERVICES | Facility: HOME HEALTHCARE | Age: 63
End: 2023-08-18
Payer: MEDICARE

## 2023-08-18 PROCEDURE — G0299 HHS/HOSPICE OF RN EA 15 MIN: HCPCS

## 2023-08-18 NOTE — ASSESSMENT & PLAN NOTE
Continue with suprapubic catheter with monthly changes. Continue home nurse care and urology evaluations.

## 2023-08-18 NOTE — ASSESSMENT & PLAN NOTE
Concerning for possible infection in pt with suprapubic catheter. VS and physical otherwise appropriate. Will order UA for evaluation. Pt would like to get testing done at home given he is unable to give sample in office.

## 2023-08-20 VITALS
DIASTOLIC BLOOD PRESSURE: 70 MMHG | SYSTOLIC BLOOD PRESSURE: 120 MMHG | HEART RATE: 78 BPM | OXYGEN SATURATION: 98 % | RESPIRATION RATE: 18 BRPM | TEMPERATURE: 97.5 F

## 2023-08-20 VITALS
OXYGEN SATURATION: 97 % | DIASTOLIC BLOOD PRESSURE: 70 MMHG | HEART RATE: 82 BPM | RESPIRATION RATE: 18 BRPM | TEMPERATURE: 97.9 F | SYSTOLIC BLOOD PRESSURE: 110 MMHG

## 2023-08-21 ENCOUNTER — HOME CARE VISIT (OUTPATIENT)
Dept: HOME HEALTH SERVICES | Facility: HOME HEALTHCARE | Age: 63
End: 2023-08-21
Payer: MEDICARE

## 2023-08-21 PROCEDURE — G0299 HHS/HOSPICE OF RN EA 15 MIN: HCPCS

## 2023-08-22 ENCOUNTER — APPOINTMENT (OUTPATIENT)
Dept: LAB | Facility: CLINIC | Age: 63
End: 2023-08-22
Payer: MEDICARE

## 2023-08-22 DIAGNOSIS — R82.90 FOUL SMELLING URINE: ICD-10-CM

## 2023-08-22 DIAGNOSIS — Z93.59 SUPRAPUBIC CATHETER (HCC): ICD-10-CM

## 2023-08-22 LAB
BACTERIA UR QL AUTO: ABNORMAL /HPF
BILIRUB UR QL STRIP: NEGATIVE
CLARITY UR: ABNORMAL
COLOR UR: ABNORMAL
GLUCOSE UR STRIP-MCNC: NEGATIVE MG/DL
HGB UR QL STRIP.AUTO: ABNORMAL
KETONES UR STRIP-MCNC: NEGATIVE MG/DL
LEUKOCYTE ESTERASE UR QL STRIP: ABNORMAL
MUCOUS THREADS UR QL AUTO: ABNORMAL
NITRITE UR QL STRIP: POSITIVE
NON-SQ EPI CELLS URNS QL MICRO: ABNORMAL /HPF
PH UR STRIP.AUTO: 7 [PH]
PROT UR STRIP-MCNC: ABNORMAL MG/DL
RBC #/AREA URNS AUTO: ABNORMAL /HPF
SP GR UR STRIP.AUTO: 1.01 (ref 1–1.03)
UROBILINOGEN UR STRIP-ACNC: <2 MG/DL
WBC #/AREA URNS AUTO: ABNORMAL /HPF

## 2023-08-22 PROCEDURE — 81001 URINALYSIS AUTO W/SCOPE: CPT

## 2023-08-22 PROCEDURE — 87086 URINE CULTURE/COLONY COUNT: CPT

## 2023-08-22 PROCEDURE — 87077 CULTURE AEROBIC IDENTIFY: CPT

## 2023-08-22 PROCEDURE — 87186 SC STD MICRODIL/AGAR DIL: CPT

## 2023-08-22 PROCEDURE — 87147 CULTURE TYPE IMMUNOLOGIC: CPT

## 2023-08-23 ENCOUNTER — HOME CARE VISIT (OUTPATIENT)
Dept: HOME HEALTH SERVICES | Facility: HOME HEALTHCARE | Age: 63
End: 2023-08-23
Payer: MEDICARE

## 2023-08-23 VITALS
RESPIRATION RATE: 18 BRPM | TEMPERATURE: 97.5 F | OXYGEN SATURATION: 97 % | DIASTOLIC BLOOD PRESSURE: 70 MMHG | HEART RATE: 76 BPM | SYSTOLIC BLOOD PRESSURE: 120 MMHG

## 2023-08-23 DIAGNOSIS — N39.0 COMPLICATED UTI (URINARY TRACT INFECTION): Primary | ICD-10-CM

## 2023-08-23 PROCEDURE — 10330064 PAD, ABD 5X9" STR LF (1/PK 20PK/BX) MGM1

## 2023-08-23 PROCEDURE — 10330064

## 2023-08-23 PROCEDURE — 10330064 DRESSING, HYDROCELLULAR ADH STR FOAM 4"X

## 2023-08-23 PROCEDURE — G0299 HHS/HOSPICE OF RN EA 15 MIN: HCPCS

## 2023-08-23 PROCEDURE — 10330064 TAPE, RETENTION 2"X10YDS (1/BX24BX/CS)

## 2023-08-23 PROCEDURE — 10330064 SYRINGE, LL 10CC (100/BX 12BX/CS)

## 2023-08-23 PROCEDURE — 10330064 SALINE, IRR SOL STR 100ML (48/CS) MGM37

## 2023-08-23 PROCEDURE — 10330064 SPONGE, GAUZE 8PLY N/S 4"X4" (200/PK 20P

## 2023-08-23 PROCEDURE — 10330064 SYRINGE, CATH TIP FLAT STR 60CC (50/CS)

## 2023-08-23 RX ORDER — CEFUROXIME AXETIL 500 MG/1
500 TABLET ORAL EVERY 12 HOURS SCHEDULED
Qty: 20 TABLET | Refills: 0 | Status: SHIPPED | OUTPATIENT
Start: 2023-08-23 | End: 2023-08-28 | Stop reason: ALTCHOICE

## 2023-08-23 NOTE — PROGRESS NOTES
Positive UA w/ microscopy. Have reviewed case with Attending and due to catheter will begin Cefuroxime 500mg BID for 10 day course.

## 2023-08-24 DIAGNOSIS — L89.324 STAGE IV PRESSURE ULCER OF LEFT BUTTOCK (HCC): ICD-10-CM

## 2023-08-24 DIAGNOSIS — G89.29 CHRONIC LOW BACK PAIN WITHOUT SCIATICA, UNSPECIFIED BACK PAIN LATERALITY: ICD-10-CM

## 2023-08-24 DIAGNOSIS — G82.20 PARAPLEGIC SPINAL PARALYSIS (HCC): ICD-10-CM

## 2023-08-24 DIAGNOSIS — M54.50 CHRONIC LOW BACK PAIN WITHOUT SCIATICA, UNSPECIFIED BACK PAIN LATERALITY: ICD-10-CM

## 2023-08-24 RX ORDER — OXYCODONE HYDROCHLORIDE 15 MG/1
15 TABLET ORAL EVERY 6 HOURS PRN
Qty: 120 TABLET | Refills: 0 | Status: SHIPPED | OUTPATIENT
Start: 2023-08-24

## 2023-08-25 ENCOUNTER — HOME CARE VISIT (OUTPATIENT)
Dept: HOME HEALTH SERVICES | Facility: HOME HEALTHCARE | Age: 63
End: 2023-08-25
Payer: MEDICARE

## 2023-08-25 ENCOUNTER — OFFICE VISIT (OUTPATIENT)
Dept: WOUND CARE | Facility: CLINIC | Age: 63
End: 2023-08-25
Payer: MEDICARE

## 2023-08-25 VITALS
HEART RATE: 58 BPM | SYSTOLIC BLOOD PRESSURE: 120 MMHG | TEMPERATURE: 96.4 F | DIASTOLIC BLOOD PRESSURE: 66 MMHG | RESPIRATION RATE: 16 BRPM

## 2023-08-25 DIAGNOSIS — L89.223 STAGE III PRESSURE ULCER OF LEFT HIP (HCC): ICD-10-CM

## 2023-08-25 DIAGNOSIS — L89.324 LEFT PERINEAL ISCHIAL PRESSURE ULCER, STAGE IV (HCC): ICD-10-CM

## 2023-08-25 DIAGNOSIS — G82.20 PARAPLEGIC SPINAL PARALYSIS (HCC): ICD-10-CM

## 2023-08-25 DIAGNOSIS — L89.154 STAGE IV PRESSURE ULCER OF SACRAL REGION (HCC): Primary | ICD-10-CM

## 2023-08-25 DIAGNOSIS — E11.9 TYPE 2 DIABETES MELLITUS WITHOUT COMPLICATION, WITHOUT LONG-TERM CURRENT USE OF INSULIN (HCC): ICD-10-CM

## 2023-08-25 DIAGNOSIS — L89.134 STAGE IV PRESSURE ULCER OF RIGHT LOWER BACK (HCC): ICD-10-CM

## 2023-08-25 PROCEDURE — 99214 OFFICE O/P EST MOD 30 MIN: CPT | Performed by: FAMILY MEDICINE

## 2023-08-25 RX ORDER — SODIUM HYPOCHLORITE 2.5 MG/ML
SOLUTION TOPICAL
Qty: 473 ML | Refills: 1 | Status: SHIPPED | OUTPATIENT
Start: 2023-08-25

## 2023-08-25 RX ORDER — LIDOCAINE HYDROCHLORIDE 40 MG/ML
5 SOLUTION TOPICAL ONCE
Status: COMPLETED | OUTPATIENT
Start: 2023-08-25 | End: 2023-08-25

## 2023-08-25 RX ADMIN — LIDOCAINE HYDROCHLORIDE 5 ML: 40 SOLUTION TOPICAL at 11:49

## 2023-08-25 NOTE — PROGRESS NOTES
Patient ID: Sánchez Gan is a 59 y.o. male Date of Birth 1960       Chief Complaint   Patient presents with   • Follow Up Wound Care Visit     Hip, buttock and back wounds       Allergies:  Patient has no known allergies. Diagnosis:      Diagnosis ICD-10-CM Associated Orders   1. Stage IV pressure ulcer of sacral region (Formerly [facility])  L89.154 sodium hypochlorite (DAKIN'S HALF-STRENGTH) external solution     CT chest abdomen pelvis w wo contrast     Comprehensive metabolic panel     Wound cleansing and dressings      2. Stage IV pressure ulcer of right lower back (Former[facility])  L89.134 lidocaine (XYLOCAINE) 4 % topical solution 5 mL     CT chest abdomen pelvis w wo contrast     Comprehensive metabolic panel     Wound cleansing and dressings      3. 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 (Former[facility])  Y23.575 CT chest abdomen pelvis w wo contrast     Comprehensive metabolic panel     Wound cleansing and dressings      4. Type 2 diabetes mellitus without complication, without long-term current use of insulin (Former[facility])  E11.9 CT chest abdomen pelvis w wo contrast     Comprehensive metabolic panel     Wound cleansing and dressings      5. Paraplegic spinal paralysis ([address])  G82.20 CT chest abdomen pelvis w wo contrast     Comprehensive metabolic panel     Wound cleansing and dressings      6. Left perineal ischial pressure ulcer, stage IV (Formerly [facility])  L89.324 CT chest abdomen pelvis w wo contrast     Comprehensive metabolic panel     Wound cleansing and dressings              Assessment & Plan:  Chronic pressure injuries: Stage IV ischial pressure ulcer, stage III pressure ulcer left hip and buttock - wounds remain relatively unchanged and are stable without signs of infection. Left ischial wound continues to communicate with perennial  Continue packing with aquacel ag rope. DO NOT SUBSTITUE Extend into left buttock and Tape tail. Cover with allevyn life.  Change three times a week or as needed for drainage  Stage IV pressure injury right lower back: Large amount of tan drainage, wound does have surrounding erythema but no lymphangitic streaking or malodor. Kelsi Tarango is reporting increasing pain to this area; denies fever or chills. Last imaging studies from 2021. Will repeat CT C/A/P with and without IV contrast; CMP ordered prescan for creatinine check  STOP aquacel AG rope for ten days. Apply calmoseptine to periwound, including red/yellow area around wound. Lightly pack wound with Dakin's soaked rolled gauze for ten days. Then go back to using Aquacel AG rope. Cover with ABD       Subjective:   Mr. Lizz Duarte is a 81-year-old gentleman with paraplegia and history of multiple pressure wounds with 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. He had been rescheduled for a debridement and flap closure by plastics in August but developed a new wound on his right mid to lower back chest wall. This wound probes deep and has been closed on the outside but the tract has not been improving. He had a CT scan that shows a deep tracking to the muscle but no fistulization to the internal thoracic cavity. 02/04/2022 he returns now for re-evaluation. He still has had visiting nurses but has not been seen in the [address] since November. He denies any change or complaint    03/11/2022 no changes since been seen last.  No new complaints. 04/22/2022 no issues. No changes. 05/27/2022. Doing well. Denies fevers or chills. No issues spoke about plastics a re-evaluation but he wants to wait for COVID to wane more and maybe next fall    07/22/2022. He has not been seen here since May mostly due to transportation issues. He has significant more pain and drainage on his right back chest wall wound. Otherwise no changes    08/05/2022 denies fevers or chills. Still in pain on the right side. 3/17/2023 he returns for evaluation. He was seen in the wound center by another provider month or 2 ago. 6/9/2023. Here for long-term follow-up.   No significant changes. 7/14/2023. No changes. Doing well. 8/25/2023: Rubi Harding presents today for follow-up of chronic wounds. He reports he has increasing pain directly at site of wound on right mid to lower chest wall/near right flank. Denies fever, chills, shortness of breath, chest pain.        The following portions of the patient's history were reviewed and updated as appropriate:   Patient Active Problem List   Diagnosis   • Stage IV pressure ulcer of sacral region [facility])   • Stage IV pressure ulcer of left heel (Formerly [facility])   • Cyst of joint of shoulder   • Anemia   • Paraplegic spinal paralysis ([facility])   • Sinus tachycardia   • GERD (gastroesophageal reflux disease)   • History of osteomyelitis   • Anxiety   • Amputated right leg ([facility])   • Benign essential hypertension   • Diabetes mellitus type II, controlled ([address])   • Diarrhea   • Decubitus ulcer of ischium, left, stage IV ([facility])   • Continuous opioid dependence ([address])   • Colostomy in place [facility])   • Colon cancer screening   • Dyspepsia   • Epigastric pain   • Osteomyelitis of pelvic region [facility])   • 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[facility])   • Neurogenic bladder   • Sepsis with hypotension ([address])   • History of Clostridium difficile colitis   • Hyperkalemia   • Stage II pressure ulcer of buttock ([address])   • Left perineal ischial pressure ulcer, stage IV ([facility])   • SBO (small bowel obstruction) ([facility])   • Sepsis ([address])   • Hypokalemia   • Renal cyst   • Other male erectile dysfunction   • Prostate cancer screening   • Type 2 diabetes mellitus without complication, without long-term current use of insulin ([facility])   • 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 ([facility])   • Stage IV pressure ulcer of right lower back ([facility])   • Pressure injury of contiguous region involving back and left buttock, stage 4 (HCC)   • Open wound of buttock, left, initial encounter   • Heme positive stool   • Pressure ulcer of left buttock, stage 4 (HCC)   • Foul smelling urine     Past Medical History:   Diagnosis Date   • Anemia    • Blind     r eye   • Chronic cystitis    • Colostomy in place [facility])    • Detrusor sphincter dyssynergia    • Diabetes mellitus ([address])     Poorly controlled type 2; Last Assessed:  3/18/14   • Erectile dysfunction    • Frequency of urination    • GERD (gastroesophageal reflux disease)    • History of diabetes mellitus    • History of osteomyelitis    • Hx of leg amputation ([facility])     r high upper leg   • Hyperlipidemia    • Hypertension    • Hypospadias    • Incomplete bladder emptying    • 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([address]) 9/16/2019   • Neurogenic bladder    • Paralysis ([address])    • Paraplegia ([facility])    • Spinal cord cysts    • Ulcer of sacral region [facility])    • Urge incontinence      Past Surgical History:   Procedure Laterality Date   • AMPUTATION      At hip; Last Assessed:  1/19/16   • BLADDER SURGERY     • COLON SURGERY      llq ostomy pouch   • COLOSTOMY     • COMPLEX CYSTOMETROGRAM  2014   • CT CYSTOGRAM  9/19/2019   • CYSTOSCOPY  2014   • FL CYSTOGRAM  1/5/2015   • FL CYSTOGRAM  11/4/2014   • FL CYSTOGRAM  10/24/2014   • IR PICC REPOSITION  9/23/2019   • IR SUPRAPUBIC CATHETER PLACEMENT  9/19/2019   • LEG AMPUTATION     • MEATOTOMY     • PENILE PROSTHESIS  REMOVAL N/A 11/1/2019    Procedure: REMOVAL PROSTHESIS PENILE;  Surgeon: Geovanny Claros MD;  Location: [facility];  Service: Urology   • PENILE PROSTHESIS IMPLANT  2011   • FL ADJT/REARRGMT SCALP/ARM/LEG 10.1-30.0 SQ CM Left 5/1/2017    Procedure: POSTERIOR THIGH V-Y ADMANCEMENT;  Surgeon: Slade Ross MD;  [facility]: [facility];  Service: Plastics   • FL MUSC MYOCUTANEOUS/FASCIOCUTANEOUS FLAP TRUNK Left 5/1/2017    Procedure: FLAP CLOSURE LEFT ISCHIAL WOUND and "RIGHT" ISCHIAL FLAP ADVANCEMENT * Idania Males ;  Surgeon: Slade Ross MD;  [facility];  Service: Plastics   • FL MUSC MYOCUTANEOUS/FASCIOCUTANEOUS FLAP TRUNK Left 9/27/2017    Procedure: gluteal myocutaneous rotational flap, posterior thigh v to y advancement- wound 5 x 2.5 x 8;  Surgeon: Sheila Corcoran MD;  Location: [facility];  Service: Plastics   • SPINE SURGERY      Lower back   • UROFLOWMETRY SIMPLE / COMPLEX       Family History   Problem Relation Age of Onset   • No Known Problems Mother    • No Known Problems Father       Social History     Socioeconomic History   • Marital status: /Civil Union     Spouse name: None   • Number of children: None   • Years of education: None   • Highest education level: None   Occupational History   • None   Tobacco Use   • Smoking status: Former     Packs/day: 0.50     Years: 10.00     Total pack years: 5.00     Types: Cigarettes     Quit date:      Years since quittin.6   • Smokeless tobacco: Never   • Tobacco comments:     Onset date:  11/10/17   Vaping Use   • Vaping Use: Never used   Substance and Sexual Activity   • Alcohol use: Yes     Comment: Per [employer]:  Social drinker (Onset date:  11/10/17)   • Drug use: No   • Sexual activity: None   Other Topics Concern   • None   Social History Narrative    Native language [address] [place] [address]    Social history reviewed, unchanged     Social Determinants of Health     Financial Resource Strain: Low Risk  (2023)    Overall Financial Resource Strain (CARDIA)    • Difficulty of Paying Living Expenses: Not very hard   Food Insecurity: No Food Insecurity (2023)    Hunger Vital Sign    • Worried About Running Out of Food in the Last Year: Never true    • Ran Out of Food in the Last Year: Never true   Transportation Needs: No Transportation Needs (2023)    PRAPARE - Transportation    • Lack of Transportation (Medical): No    • Lack of Transportation (Non-Medical):  No   Physical Activity: Not on file   Stress: Not on file   Social Connections: Not on file   Intimate Partner Violence: Not on file   Housing Stability: Not on file        Current Outpatient Medications:   •  omeprazole (PriLOSEC) 40 MG capsule, Take 1 capsule (40 mg total) by mouth daily, Disp: 90 capsule, Rfl: 0  •  sodium hypochlorite (DAKIN'S HALF-STRENGTH) external solution, Soak gauze and pack into wounds with each dressing change as directed., Disp: 473 mL, Rfl: 1  •  Accu-Chek FastClix Lancets MISC, Inject under the skin daily, Disp: 100 each, Rfl: 0  •  acetaminophen (TYLENOL) 325 mg tablet, Take 2 tablets (650 mg total) by mouth every 6 (six) hours as needed for mild pain, headaches or fever (Patient not taking: Reported on 9/2/2022), Disp: 30 tablet, Rfl: 0  •  aspirin (RA Aspirin EC) 81 mg EC tablet, Take 1 tablet (81 mg total) by mouth daily, Disp: 90 tablet, Rfl: 0  •  Blood Glucose Monitoring Suppl (ONE TOUCH ULTRA 2) w/Device KIT, Use daily, Disp: 100 kit, Rfl: 0  •  cefuroxime (CEFTIN) 500 mg tablet, Take 1 tablet (500 mg total) by mouth every 12 (twelve) hours for 10 days, Disp: 20 tablet, Rfl: 0  •  Disposable Gloves (LATEX GLOVES LARGE) MISC, Use as needed up to 3 times a day dispo 2 boxes, Disp: 2 each, Rfl: 11  •  ergocalciferol (VITAMIN D2) 50,000 units, Take 1 capsule (50,000 Units total) by mouth once a week (Patient not taking: Reported on 9/2/2022), Disp: 12 capsule, Rfl: 1  •  glucose blood (Accu-Chek Guide) test strip, Use 1 each daily Use as instructed, Disp: 100 each, Rfl: 0  •  ibuprofen (MOTRIN) 800 mg tablet, Take 1 tablet (800 mg total) by mouth every 8 (eight) hours as needed for mild pain, Disp: 60 tablet, Rfl: 0  •  Incontinence Supply Disposable (SUNBEAM INCONTINENT UNDER PAD) MISC, Use 1 per week, dispense 4 reusable underpads, Disp: 4 each, Rfl: 11  •  Incontinence Supply Disposable MISC, by Does not apply route 2 (two) times a day, Disp: 60 each, Rfl: 11  •  naloxone (NARCAN) 4 mg/0.1 mL nasal spray, Administer 1 spray into a nostril.  If no response after 2-3 minutes, give another dose in the other nostril using a new spray., Disp: 1 each, Rfl: 1  •  Nutritional Supplements (Glucerna Shake) LIQD, Take 3 Cans by mouth 3 (three) times a day, Disp: 63211 mL, Rfl: 0  •  oxyCODONE (Roxicodone) 15 mg immediate release tablet, Take 1 tablet (15 mg total) by mouth every 6 (six) hours as needed for moderate pain Max Daily Amount: 60 mg, Disp: 120 tablet, Rfl: 0  •  saccharomyces boulardii (FLORASTOR) 250 mg capsule, Take 1 capsule (250 mg total) by mouth 2 (two) times a day, Disp: 30 capsule, Rfl: 0  No current facility-administered medications for this visit. Review of Systems   Constitutional: Negative for chills and fever. HENT: Negative for ear pain and sore throat. Eyes: Negative for pain and visual disturbance. Respiratory: Negative for cough and shortness of breath. Cardiovascular: Negative for chest pain and palpitations. Gastrointestinal: Negative for abdominal pain and vomiting. Skin: Positive for wound. Negative for color change and rash. Chronic pressure injuries buttocks  Right lower chest wall / flank wound with increasing pain   Neurological: Positive for weakness and numbness. Psychiatric/Behavioral: Negative for agitation and behavioral problems. All other systems reviewed and are negative. Objective:  /66   Pulse 58   Temp (!) 96.4 °F (35.8 °C)   Resp 16   Pain Score:   8     Physical Exam  Vitals and nursing note reviewed. Exam conducted with a chaperone present. Constitutional:       General: He is not in acute distress. Appearance: He is not ill-appearing. Cardiovascular:      Rate and Rhythm: Normal rate. Pulses: Normal pulses. Pulmonary:      Effort: Pulmonary effort is normal.   Abdominal:      General: There is no distension. Palpations: Abdomen is soft. There is no mass. Tenderness: There is no abdominal tenderness. Comments: Ostomy   Musculoskeletal:      Comments: Paraplegia. Right hip disarticulation and removal   Skin:     General: Skin is warm and dry. Findings: Wound present.       Comments: Pressure injuries: left pressure injury hip, buttock, pressure injury perineum communicating with left buttock scar tissue and some surrounding maceration  As described above with no signs of infection, no malodor    Pressure injury right back/chest wall near right flank with large amount of tan drainage and surrounding erythema with maceration, no lymphangitic streaking 5.3 cm tunneling at 11:00. No malodor. See complete wound assessment   Neurological:      Mental Status: He is alert and oriented to person, place, and time. Psychiatric:         Mood and Affect: Mood normal.         Behavior: Behavior normal.             Wound 08/26/20 Pressure Injury Buttocks Left (Active)   Wound Image   08/25/23 1116   Wound Description Pink;Probes to bone 08/25/23 1120   Pressure Injury Stage 4 06/09/23 0921   Shelby-wound Assessment Scar Tissue; Intact; Maceration 08/25/23 1120   Wound Length (cm) 2.2 cm 08/25/23 1120   Wound Width (cm) 2 cm 08/25/23 1120   Wound Depth (cm) 1.5 cm 08/25/23 1120   Wound Surface Area (cm^2) 4.4 cm^2 08/25/23 1120   Wound Volume (cm^3) 6.6 cm^3 08/25/23 1120   Calculated Wound Volume (cm^3) 6.6 cm^3 08/25/23 1120   Change in Wound Size % 77.08 08/25/23 1120   Tunneling 4 cm 07/14/23 1007   Tunneling in depth located at communicates with perinial wound at 3:00 08/25/23 1120   Undermining 5 08/25/23 1120   Undermining is depth extending from 6-12, deepest at 11:00 08/25/23 1120   Drainage Amount Large 08/25/23 1120   Drainage Description Yellow; Tan 08/25/23 1120   Non-staged Wound Description Full thickness 08/25/23 1120   Treatments Irrigation with NSS 08/25/23 1120   Wound packed? Yes 02/16/23 1054   Packing- # removed 1 02/16/23 1054   Dressing Changed Changed 06/09/21 1031   Patient Tolerance Tolerated well 07/14/23 1007   Dressing Status Removed 07/14/23 1007       Wound 08/26/20 Pressure Injury Hip Left (Active)   Wound Image   08/25/23 1116   Wound Description Pink 08/25/23 1119   Pressure Injury Stage 3 11/05/21 1101   Shelby-wound Assessment Scar Tissue; Intact 08/25/23 1119   Wound Length (cm) 0.7 cm 08/25/23 1119   Wound Width (cm) 1.9 cm 08/25/23 1119   Wound Depth (cm) 0.1 cm 08/25/23 1119   Wound Surface Area (cm^2) 1.33 cm^2 08/25/23 1119   Wound Volume (cm^3) 0.133 cm^3 08/25/23 1119   Calculated Wound Volume (cm^3) 0.13 cm^3 08/25/23 1119   Change in Wound Size % 43.48 08/25/23 1119   Undermining 0.2 03/17/23 1051   Undermining is depth extending from 6-1 03/17/23 1051   Drainage Amount Small 08/25/23 1119   Drainage Description Serous; Yellow 08/25/23 1119   Non-staged Wound Description Full thickness 08/25/23 1119   Treatments Irrigation with NSS 08/25/23 1119   Dressing Changed Changed 02/04/22 1111   Patient Tolerance Tolerated well 06/09/23 0918   Dressing Status Removed 06/09/23 0918       Wound 07/29/21 Pressure Injury Back Right; Lower; Lateral (Active)   Wound Image   08/25/23 1117   Wound Description DISHA 08/25/23 1125   Pressure Injury Stage 3 06/09/23 0926   Shelby-wound Assessment Maceration;Erythema; Excoriated 08/25/23 1125   Wound Length (cm) 1.5 cm 08/25/23 1125   Wound Width (cm) 3.8 cm 08/25/23 1125   Wound Depth (cm) 3.5 cm 08/25/23 1125   Wound Surface Area (cm^2) 5.7 cm^2 08/25/23 1125   Wound Volume (cm^3) 19.95 cm^3 08/25/23 1125   Calculated Wound Volume (cm^3) 19.95 cm^3 08/25/23 1125   Change in Wound Size % -14.92 08/25/23 1125   Tunneling 4.1 cm 07/14/23 0954   Tunneling in depth located at 5.3 @ 11:00 08/25/23 1125   Undermining 0 07/14/23 0954   Undermining is depth extending from resolved 07/14/23 0954   Drainage Amount Large 08/25/23 1125   Drainage Description Tan 08/25/23 1125   Non-staged Wound Description Full thickness 08/25/23 1125   Treatments Irrigation with NSS 08/25/23 1125   Patient Tolerance Tolerated well 07/14/23 0954   Dressing Status Removed 07/14/23 0954       Wound 11/05/21 Pressure Injury Perineum (Active)   Wound Image   08/25/23 1117   Wound Description Pink;Yellow 07/14/23 1004   Pressure Injury Stage 3 06/09/23 0924 Shelby-wound Assessment Intact;Scar Tissue 08/25/23 1123   Wound Length (cm) 1.5 cm 08/25/23 1123   Wound Width (cm) 1.5 cm 08/25/23 1123   Wound Depth (cm) 1.8 cm 08/25/23 1123   Wound Surface Area (cm^2) 2.25 cm^2 08/25/23 1123   Wound Volume (cm^3) 4.05 cm^3 08/25/23 1123   Calculated Wound Volume (cm^3) 4.05 cm^3 08/25/23 1123   Change in Wound Size % -3950 08/25/23 1123   Tunneling 4 cm 11/12/21 1121   Tunneling in depth located at tunnel of 7.4 at 11:00, communicates with left buttuck wound at 9:00 08/25/23 1123   Undermining 4.5 08/25/23 1123   Undermining is depth extending from 11-6 08/25/23 1123   Drainage Amount Large 08/25/23 1123   Drainage Description Yellow; Tan 08/25/23 1123   Non-staged Wound Description Full thickness 08/25/23 1123   Treatments Irrigation with NSS 08/25/23 1123   Patient Tolerance Tolerated well 07/14/23 1004   Dressing Status Removed 07/14/23 1004       Wound 06/19/23 Pressure Injury Hip Left;Medial (Active)   Wound Image   08/25/23 1116   Wound Description Pink;Yellow;Epithelialization 08/25/23 1118   Shelby-wound Assessment Intact;Scar Tissue 08/25/23 1118   Wound Length (cm) 0.3 cm 08/25/23 1118   Wound Width (cm) 0.4 cm 08/25/23 1118   Wound Depth (cm) 0.1 cm 08/25/23 1118   Wound Surface Area (cm^2) 0.12 cm^2 08/25/23 1118   Wound Volume (cm^3) 0.012 cm^3 08/25/23 1118   Calculated Wound Volume (cm^3) 0.01 cm^3 08/25/23 1118   Change in Wound Size % 90.91 08/25/23 1118   Drainage Amount Scant 08/25/23 1118   Drainage Description Serous 08/25/23 1118   Treatments Irrigation with NSS 08/25/23 1118             Lab Results   Component Value Date    HGBA1C 5.6 01/10/2023       Wound Instructions:  Orders Placed This Encounter   Procedures   • Wound cleansing and dressings     Shower, No. Do not get dressings wet. Left hips wounds:  Cleanse/Irrigate wound with normal saline. Cover wounds with silicon bordered foam.       R lateral back:   STOP aquacel AG rope for ten days.    Apply calmoseptine to periwound, including red/yellow area around wound. Lightly pack wound with Dakin's soaked rolled gauze for ten days. Then go back to using Aquacel AG rope. Cover with ABD   Secure with paper tape of medfix tape only. Change daily or as needed for drainage.       Perineum and Left Buttock Wound:   Skin prep to periwound. Gently pack with aquacel ag rope, using one piece for each wound. Tape tail for each piece. DO NOT SUBSTITUE. Cover with allevyn life. Change three times a week or as needed for drainage.       You need to keep pressure off of right back wound.       Continue VNA three times a week for dressing changes, except on the day the patient goes to the wound center.       OFF-LOADING   Avoid pressure at wound site. - all wounds, including right back   Wheelchair Cushion. - ROHO cushion   Do Not Sit for Long Periods of Time. - 1 hr max for meals only, otherwise in bed and turn side to side at least   every 3 hours or more frequently   Reposition in regular bed for now at least every 1-2 hours while awake. CT scan of back and bloodwork ordered by Dr. Theodore Avelar. Script for Dakin's sent to your pharmacy.       Follow up in 4 weeks with Dr. Hardy Check treatment note: Cleansed with NSS and dressed as above. Standing Status:   Future     Standing Expiration Date:   8/25/2024   • CT chest abdomen pelvis w wo contrast     BMP ordered     Standing Status:   Future     Standing Expiration Date:   8/25/2027     Scheduling Instructions: For procedures with both oral (PO) and IV contrast:            The patient will need to drink barium for this test. Barium needs to be picked up in the registration area at least one day prior to your study. For out of network (non-[facility]) orders please bring your prescription when picking up oral contrast. For AM Appointments: Drink one bottle of barium before bed time the evening before your scheduled test.  Drink 1/2 of the second bottle one hour prior to your test. Please bring other 1/2 bottle with you to drink at the time of your study. For PM Appointments: Drink one bottle of barium before 9:00am on the day of your test. Drink 1/2 of the second bottle one hour prior to your test. Please bring other 1/2 bottle with you to drink at the time of your study. Nothing to eat 3 hours prior to your test. In addition to the barium, clear liquids are also permitted up until the time of the scan. Clear liquids includes water, black coffee or tea, apple juice or clear broth. If possible wear clothing without any metal in the abdomen area. Sweat suit,       sports bra or bra without underwire may eliminate the need to change. Please bring your insurance cards, a form of photo ID and a list of your medications with you. Arrive 15 minutes prior to your appointment time in order to register. On the day of your test, please bring any prior CT or MRI studies of this area with you that were not performed at a [facility]. For procedures with ONLY IV contrast:            Nothing to eat 3 hours prior to your test. Clear liquids are permitted up until the time of the scan. Clear liquids includes water, black coffee or tea, apple juice or clear broth. If possible wear clothing without any metal in the abdomen area. Sweat suit, sports bra or bra without underwire may eliminate the need to change. Please bring your insurance cards, a form of photo ID and a list of your medications with you. Arrive 15 minutes prior to your appointment time in order to register. On the day of your test, please bring any prior CT or MRI studies of this area with you that were not performed at a [facility]. To schedule this appointment, please contact Central Scheduling at [phone]. Order Specific Question:   What is the patient's sedation requirement?  If Medication for Claustrophobia is selected, order medication at this point. Answer:   No Sedation     Order Specific Question:   Did the patient ever have bariatric surgery? Answer:   No     Order Specific Question:   Contrast information:     Answer:   IV     Order Specific Question:   Did the patient ever have a reaction to x-ray dye? If yes, please verify the type of allergy and order the contrast allergy prep. Answer:   No     Order Specific Question:   Release to patient through Mychart     Answer:   Immediate     Order Specific Question:   Reason for Exam (FREE TEXT)     Answer:   increasing pain in chronic wound right flank toward rib cage. Chronic wound pelvis/buttocks   • Comprehensive metabolic panel     Standing Status:   Future     Standing Expiration Date:   8/25/2024     Order Specific Question: This patient has an active home care or hospice episode. Should this order be COMPLETED by [facility]? Answer:   No       -I, Karely Steve MD,  have acted as a scribe with the above documentation.   -Roberto Carlos Donaldson MD, CWS have seen and evaluated the above patient and have reviewed and agree with the above documentation. Stacy Mendoza MD, CHT, CWS    Portions of the record may have been created with voice recognition software. Occasional wrong word or "sound alike" substitutions may have occurred due to the inherent limitations of voice recognition software. Read the chart carefully and recognize, using context, where substitutions have occurred.

## 2023-08-25 NOTE — PATIENT INSTRUCTIONS
Orders Placed This Encounter   Procedures    Wound cleansing and dressings     Shower, No. Do not get dressings wet. Left hips wounds:  Cleanse/Irrigate wound with normal saline. Cover wounds with silicon bordered foam.       R lateral back:   STOP aquacel AG rope for ten days. Apply calmoseptine to periwound, including red/yellow area around wound. Lightly pack wound with Dakin's soaked rolled gauze for ten days. Then go back to using Aquacel AG rope. Cover with ABD   Secure with paper tape of medfix tape only. Change daily or as needed for drainage. Perineum and Left Buttock Wound:   Skin prep to periwound. Gently pack with aquacel ag rope, using one piece for each wound. Tape tail for each piece. DO NOT SUBSTITUE. Cover with allevyn life. Change three times a week or as needed for drainage. You need to keep pressure off of right back wound. Continue VNA three times a week for dressing changes, except on the day the patient goes to the wound center. OFF-LOADING   Avoid pressure at wound site. - all wounds, including right back   Wheelchair Cushion. - ROHO cushion   Do Not Sit for Long Periods of Time. - 1 hr max for meals only, otherwise in bed and turn side to side at least   every 3 hours or more frequently   Reposition in regular bed for now at least every 1-2 hours while awake. CT scan of back and bloodwork ordered by Dr. Lisa James. Script for Dakin's sent to your pharmacy. Follow up in 4 weeks with Dr. Drucie Hodgkins treatment note: Cleansed with NSS and dressed as above. Standing Status:   Future     Standing Expiration Date:   8/25/2024    CT chest abdomen pelvis w wo contrast     BMP ordered     Standing Status:   Future     Standing Expiration Date:   8/25/2027     Scheduling Instructions:       For procedures with both oral (PO) and IV contrast:            The patient will need to drink barium for this test. Barium needs to be picked up in the registration area at least one day prior to your study. For out of network (non-[facility]) orders please bring your prescription when picking up oral contrast. For AM Appointments: Drink one bottle of barium before bed time the evening before your scheduled test.  Drink 1/2 of the second bottle one hour prior to your test. Please bring other 1/2 bottle with you to drink at the time of your study. For PM Appointments: Drink one bottle of barium before 9:00am on the day of your test. Drink 1/2 of the second bottle one hour prior to your test. Please bring other 1/2 bottle with you to drink at the time of your study. Nothing to eat 3 hours prior to your test. In addition to the barium, clear liquids are also permitted up until the time of the scan. Clear liquids includes water, black coffee or tea, apple juice or clear broth. If possible wear clothing without any metal in the abdomen area. Sweat suit,       sports bra or bra without underwire may eliminate the need to change. Please bring your insurance cards, a form of photo ID and a list of your medications with you. Arrive 15 minutes prior to your appointment time in order to register. On the day of your test, please bring any prior CT or MRI studies of this area with you that were not performed at a [facility] facility. For procedures with ONLY IV contrast:            Nothing to eat 3 hours prior to your test. Clear liquids are permitted up until the time of the scan. Clear liquids includes water, black coffee or tea, apple juice or clear broth. If possible wear clothing without any metal in the abdomen area. Sweat suit, sports bra or bra without underwire may eliminate the need to change. Please bring your insurance cards, a form of photo ID and a list of your medications with you. Arrive 15 minutes prior to your appointment time in order to register.  On the day of your test, please bring any prior CT or MRI studies of this area with you that were not performed at a [facility]. To schedule this appointment, please contact Central Scheduling at [phone]. Order Specific Question:   What is the patient's sedation requirement? If Medication for Claustrophobia is selected, order medication at this point. Answer:   No Sedation     Order Specific Question:   Did the patient ever have bariatric surgery? Answer:   No     Order Specific Question:   Contrast information:     Answer:   IV     Order Specific Question:   Did the patient ever have a reaction to x-ray dye? If yes, please verify the type of allergy and order the contrast allergy prep. Answer:   No     Order Specific Question:   Release to patient through [facility]     Answer:   Immediate     Order Specific Question:   Reason for Exam (FREE TEXT)     Answer:   increasing pain in chronic wound right flank toward rib cage. Chronic wound pelvis/buttocks    Comprehensive metabolic panel     Standing Status:   Future     Standing Expiration Date:   8/25/2024     Order Specific Question: This patient has an active home care or hospice episode. Should this order be COMPLETED by [facility]? Answer:    No Hydroquinone Counseling:  Patient advised that medication may result in skin irritation, lightening (hypopigmentation), dryness, and burning.  In the event of skin irritation, the patient was advised to reduce the amount of the drug applied or use it less frequently.  Rarely, spots that are treated with hydroquinone can become darker (pseudoochronosis).  Should this occur, patient instructed to stop medication and call the office. The patient verbalized understanding of the proper use and possible adverse effects of hydroquinone.  All of the patient's questions and concerns were addressed.

## 2023-08-26 LAB
BACTERIA UR CULT: ABNORMAL

## 2023-08-28 ENCOUNTER — TELEPHONE (OUTPATIENT)
Dept: OTHER | Facility: HOSPITAL | Age: 63
End: 2023-08-28

## 2023-08-28 ENCOUNTER — HOME CARE VISIT (OUTPATIENT)
Dept: HOME HEALTH SERVICES | Facility: HOME HEALTHCARE | Age: 63
End: 2023-08-28
Payer: MEDICARE

## 2023-08-28 VITALS
OXYGEN SATURATION: 97 % | RESPIRATION RATE: 18 BRPM | DIASTOLIC BLOOD PRESSURE: 60 MMHG | SYSTOLIC BLOOD PRESSURE: 110 MMHG | TEMPERATURE: 97.5 F | HEART RATE: 84 BPM

## 2023-08-28 DIAGNOSIS — N39.0 COMPLICATED UTI (URINARY TRACT INFECTION): Primary | ICD-10-CM

## 2023-08-28 DIAGNOSIS — R82.90 FOUL SMELLING URINE: ICD-10-CM

## 2023-08-28 PROCEDURE — G0299 HHS/HOSPICE OF RN EA 15 MIN: HCPCS

## 2023-08-28 RX ORDER — LEVOFLOXACIN 750 MG/1
750 TABLET ORAL EVERY 24 HOURS
Qty: 5 TABLET | Refills: 0 | Status: SHIPPED | OUTPATIENT
Start: 2023-08-28 | End: 2023-09-02

## 2023-08-28 NOTE — PROGRESS NOTES
Will discontinue cefuroxime and begin levofloxacin 750mg daily for 5 days after reviewing UA sensitivities. Pt is to schedule appointment for follow up after completing abx therapy.

## 2023-08-28 NOTE — TELEPHONE ENCOUNTER
Spoke with  Alexandrea Obi regarding recent UA culture results. Given updated sensibilities will adjust his abx therapy. Will discontinue cefuroxime and begin levofloxacin 750mg daily for 5 days. All questions answered. Pt is to schedule appointment for follow up after completing abx therapy. Have reviewed this plan with Attending Dr. Victor Manuel Castro.

## 2023-08-29 ENCOUNTER — APPOINTMENT (OUTPATIENT)
Dept: LAB | Facility: CLINIC | Age: 63
End: 2023-08-29
Payer: MEDICARE

## 2023-08-29 DIAGNOSIS — G82.20 PARAPLEGIC SPINAL PARALYSIS (HCC): ICD-10-CM

## 2023-08-29 DIAGNOSIS — E11.9 TYPE 2 DIABETES MELLITUS WITHOUT COMPLICATION, WITHOUT LONG-TERM CURRENT USE OF INSULIN (HCC): ICD-10-CM

## 2023-08-29 DIAGNOSIS — L89.134 STAGE IV PRESSURE ULCER OF RIGHT LOWER BACK (HCC): ICD-10-CM

## 2023-08-29 DIAGNOSIS — L89.324 LEFT PERINEAL ISCHIAL PRESSURE ULCER, STAGE IV (HCC): ICD-10-CM

## 2023-08-29 DIAGNOSIS — L89.223 STAGE III PRESSURE ULCER OF LEFT HIP (HCC): ICD-10-CM

## 2023-08-29 DIAGNOSIS — L89.154 STAGE IV PRESSURE ULCER OF SACRAL REGION (HCC): ICD-10-CM

## 2023-08-29 LAB
ALBUMIN SERPL BCP-MCNC: 3.1 G/DL (ref 3.5–5)
ALP SERPL-CCNC: 71 U/L (ref 34–104)
ALT SERPL W P-5'-P-CCNC: 11 U/L (ref 7–52)
ANION GAP SERPL CALCULATED.3IONS-SCNC: 8 MMOL/L
AST SERPL W P-5'-P-CCNC: 15 U/L (ref 13–39)
BILIRUB SERPL-MCNC: 0.17 MG/DL (ref 0.2–1)
BUN SERPL-MCNC: 21 MG/DL (ref 5–25)
CALCIUM ALBUM COR SERPL-MCNC: 9.7 MG/DL (ref 8.3–10.1)
CALCIUM SERPL-MCNC: 9 MG/DL (ref 8.4–10.2)
CHLORIDE SERPL-SCNC: 107 MMOL/L (ref 96–108)
CO2 SERPL-SCNC: 23 MMOL/L (ref 21–32)
CREAT SERPL-MCNC: 0.43 MG/DL (ref 0.6–1.3)
GFR SERPL CREATININE-BSD FRML MDRD: 123 ML/MIN/1.73SQ M
GLUCOSE P FAST SERPL-MCNC: 62 MG/DL (ref 65–99)
POTASSIUM SERPL-SCNC: 3.8 MMOL/L (ref 3.5–5.3)
PROT SERPL-MCNC: 8.1 G/DL (ref 6.4–8.4)
SODIUM SERPL-SCNC: 138 MMOL/L (ref 135–147)

## 2023-08-29 PROCEDURE — 36415 COLL VENOUS BLD VENIPUNCTURE: CPT

## 2023-08-29 PROCEDURE — 80053 COMPREHEN METABOLIC PANEL: CPT

## 2023-08-30 ENCOUNTER — HOME CARE VISIT (OUTPATIENT)
Dept: HOME HEALTH SERVICES | Facility: HOME HEALTHCARE | Age: 63
End: 2023-08-30
Payer: MEDICARE

## 2023-08-30 VITALS
HEART RATE: 82 BPM | TEMPERATURE: 98.3 F | RESPIRATION RATE: 16 BRPM | OXYGEN SATURATION: 100 % | DIASTOLIC BLOOD PRESSURE: 70 MMHG | SYSTOLIC BLOOD PRESSURE: 120 MMHG

## 2023-08-30 PROCEDURE — G0300 HHS/HOSPICE OF LPN EA 15 MIN: HCPCS

## 2023-09-01 ENCOUNTER — HOME CARE VISIT (OUTPATIENT)
Dept: HOME HEALTH SERVICES | Facility: HOME HEALTHCARE | Age: 63
End: 2023-09-01
Payer: MEDICARE

## 2023-09-01 PROCEDURE — G0299 HHS/HOSPICE OF RN EA 15 MIN: HCPCS

## 2023-09-02 VITALS
DIASTOLIC BLOOD PRESSURE: 70 MMHG | SYSTOLIC BLOOD PRESSURE: 120 MMHG | TEMPERATURE: 97.9 F | HEART RATE: 78 BPM | OXYGEN SATURATION: 96 % | RESPIRATION RATE: 18 BRPM

## 2023-09-02 VITALS
TEMPERATURE: 97.2 F | HEART RATE: 86 BPM | DIASTOLIC BLOOD PRESSURE: 70 MMHG | RESPIRATION RATE: 18 BRPM | SYSTOLIC BLOOD PRESSURE: 110 MMHG | OXYGEN SATURATION: 97 %

## 2023-09-04 ENCOUNTER — HOME CARE VISIT (OUTPATIENT)
Dept: HOME HEALTH SERVICES | Facility: HOME HEALTHCARE | Age: 63
End: 2023-09-04
Payer: MEDICARE

## 2023-09-06 ENCOUNTER — HOME CARE VISIT (OUTPATIENT)
Dept: HOME HEALTH SERVICES | Facility: HOME HEALTHCARE | Age: 63
End: 2023-09-06
Payer: MEDICARE

## 2023-09-06 PROCEDURE — G0299 HHS/HOSPICE OF RN EA 15 MIN: HCPCS

## 2023-09-07 ENCOUNTER — HOSPITAL ENCOUNTER (OUTPATIENT)
Dept: CT IMAGING | Facility: HOSPITAL | Age: 63
Discharge: HOME | End: 2023-09-07
Attending: FAMILY MEDICINE
Payer: MEDICARE

## 2023-09-07 DIAGNOSIS — L89.223 STAGE III PRESSURE ULCER OF LEFT HIP (HCC): ICD-10-CM

## 2023-09-07 DIAGNOSIS — L89.154 STAGE IV PRESSURE ULCER OF SACRAL REGION (HCC): ICD-10-CM

## 2023-09-07 DIAGNOSIS — L89.134 STAGE IV PRESSURE ULCER OF RIGHT LOWER BACK (HCC): ICD-10-CM

## 2023-09-07 DIAGNOSIS — G82.20 PARAPLEGIC SPINAL PARALYSIS (HCC): ICD-10-CM

## 2023-09-07 DIAGNOSIS — E11.9 TYPE 2 DIABETES MELLITUS WITHOUT COMPLICATION, WITHOUT LONG-TERM CURRENT USE OF INSULIN (HCC): ICD-10-CM

## 2023-09-07 DIAGNOSIS — L89.324 LEFT PERINEAL ISCHIAL PRESSURE ULCER, STAGE IV (HCC): ICD-10-CM

## 2023-09-07 PROCEDURE — G1004 CDSM NDSC: HCPCS

## 2023-09-07 PROCEDURE — 74177 CT ABD & PELVIS W/CONTRAST: CPT

## 2023-09-07 PROCEDURE — 71260 CT THORAX DX C+: CPT

## 2023-09-07 RX ADMIN — IOHEXOL 100 ML: 350 INJECTION, SOLUTION INTRAVENOUS at 15:45

## 2023-09-08 ENCOUNTER — HOME CARE VISIT (OUTPATIENT)
Dept: HOME HEALTH SERVICES | Facility: HOME HEALTHCARE | Age: 63
End: 2023-09-08
Payer: MEDICARE

## 2023-09-08 VITALS
HEART RATE: 84 BPM | OXYGEN SATURATION: 97 % | SYSTOLIC BLOOD PRESSURE: 110 MMHG | DIASTOLIC BLOOD PRESSURE: 60 MMHG | RESPIRATION RATE: 18 BRPM

## 2023-09-08 PROCEDURE — G0299 HHS/HOSPICE OF RN EA 15 MIN: HCPCS

## 2023-09-11 ENCOUNTER — HOME CARE VISIT (OUTPATIENT)
Dept: HOME HEALTH SERVICES | Facility: HOME HEALTHCARE | Age: 63
End: 2023-09-11
Payer: MEDICARE

## 2023-09-11 VITALS
SYSTOLIC BLOOD PRESSURE: 120 MMHG | DIASTOLIC BLOOD PRESSURE: 70 MMHG | TEMPERATURE: 97.1 F | RESPIRATION RATE: 18 BRPM | HEART RATE: 70 BPM | OXYGEN SATURATION: 96 %

## 2023-09-11 PROCEDURE — G0299 HHS/HOSPICE OF RN EA 15 MIN: HCPCS

## 2023-09-11 PROCEDURE — 10330064 FOAM, ADH SIL W/BORDER SACRAL 7"X7" (10/

## 2023-09-13 ENCOUNTER — TELEPHONE (OUTPATIENT)
Dept: HOME HEALTH SERVICES | Facility: HOME HEALTHCARE | Age: 63
End: 2023-09-13

## 2023-09-13 ENCOUNTER — HOME CARE VISIT (OUTPATIENT)
Dept: HOME HEALTH SERVICES | Facility: HOME HEALTHCARE | Age: 63
End: 2023-09-13
Payer: MEDICARE

## 2023-09-15 ENCOUNTER — HOME CARE VISIT (OUTPATIENT)
Dept: HOME HEALTH SERVICES | Facility: HOME HEALTHCARE | Age: 63
End: 2023-09-15
Payer: MEDICARE

## 2023-09-15 PROCEDURE — G0299 HHS/HOSPICE OF RN EA 15 MIN: HCPCS

## 2023-09-17 VITALS
TEMPERATURE: 97.3 F | OXYGEN SATURATION: 97 % | RESPIRATION RATE: 18 BRPM | DIASTOLIC BLOOD PRESSURE: 70 MMHG | SYSTOLIC BLOOD PRESSURE: 110 MMHG | HEART RATE: 76 BPM

## 2023-09-18 ENCOUNTER — HOME CARE VISIT (OUTPATIENT)
Dept: HOME HEALTH SERVICES | Facility: HOME HEALTHCARE | Age: 63
End: 2023-09-18
Payer: MEDICARE

## 2023-09-18 PROCEDURE — G0299 HHS/HOSPICE OF RN EA 15 MIN: HCPCS

## 2023-09-20 ENCOUNTER — HOME CARE VISIT (OUTPATIENT)
Dept: HOME HEALTH SERVICES | Facility: HOME HEALTHCARE | Age: 63
End: 2023-09-20
Payer: MEDICARE

## 2023-09-20 ENCOUNTER — TELEPHONE (OUTPATIENT)
Dept: HOME HEALTH SERVICES | Facility: HOME HEALTHCARE | Age: 63
End: 2023-09-20

## 2023-09-20 VITALS
SYSTOLIC BLOOD PRESSURE: 110 MMHG | RESPIRATION RATE: 16 BRPM | OXYGEN SATURATION: 97 % | DIASTOLIC BLOOD PRESSURE: 68 MMHG | HEART RATE: 78 BPM | TEMPERATURE: 97.3 F

## 2023-09-21 DIAGNOSIS — M54.50 CHRONIC LOW BACK PAIN WITHOUT SCIATICA, UNSPECIFIED BACK PAIN LATERALITY: ICD-10-CM

## 2023-09-21 DIAGNOSIS — K21.9 GASTROESOPHAGEAL REFLUX DISEASE: ICD-10-CM

## 2023-09-21 DIAGNOSIS — L89.324 STAGE IV PRESSURE ULCER OF LEFT BUTTOCK (HCC): ICD-10-CM

## 2023-09-21 DIAGNOSIS — G89.29 CHRONIC LOW BACK PAIN WITHOUT SCIATICA, UNSPECIFIED BACK PAIN LATERALITY: ICD-10-CM

## 2023-09-21 DIAGNOSIS — G82.20 PARAPLEGIC SPINAL PARALYSIS (HCC): ICD-10-CM

## 2023-09-21 RX ORDER — OMEPRAZOLE 40 MG/1
40 CAPSULE, DELAYED RELEASE ORAL DAILY
Qty: 90 CAPSULE | Refills: 0 | Status: SHIPPED | OUTPATIENT
Start: 2023-09-21

## 2023-09-22 ENCOUNTER — HOME CARE VISIT (OUTPATIENT)
Dept: HOME HEALTH SERVICES | Facility: HOME HEALTHCARE | Age: 63
End: 2023-09-22
Payer: MEDICARE

## 2023-09-22 ENCOUNTER — OFFICE VISIT (OUTPATIENT)
Dept: WOUND CARE | Facility: CLINIC | Age: 63
End: 2023-09-22
Payer: MEDICARE

## 2023-09-22 VITALS
RESPIRATION RATE: 16 BRPM | SYSTOLIC BLOOD PRESSURE: 130 MMHG | TEMPERATURE: 95.6 F | HEART RATE: 64 BPM | DIASTOLIC BLOOD PRESSURE: 78 MMHG

## 2023-09-22 DIAGNOSIS — L89.324 LEFT PERINEAL ISCHIAL PRESSURE ULCER, STAGE IV (HCC): ICD-10-CM

## 2023-09-22 DIAGNOSIS — L89.134 STAGE IV PRESSURE ULCER OF RIGHT LOWER BACK (HCC): Primary | ICD-10-CM

## 2023-09-22 DIAGNOSIS — E11.9 TYPE 2 DIABETES MELLITUS WITHOUT COMPLICATION, WITHOUT LONG-TERM CURRENT USE OF INSULIN (HCC): ICD-10-CM

## 2023-09-22 DIAGNOSIS — L89.223 STAGE III PRESSURE ULCER OF LEFT HIP (HCC): ICD-10-CM

## 2023-09-22 DIAGNOSIS — L89.154 STAGE IV PRESSURE ULCER OF SACRAL REGION (HCC): Chronic | ICD-10-CM

## 2023-09-22 LAB
DME PARACHUTE DELIVERY DATE REQUESTED: NORMAL
DME PARACHUTE ITEM DESCRIPTION: NORMAL
DME PARACHUTE ORDER STATUS: NORMAL
DME PARACHUTE SUPPLIER NAME: NORMAL
DME PARACHUTE SUPPLIER PHONE: NORMAL

## 2023-09-22 PROCEDURE — 11045 DBRDMT SUBQ TISS EACH ADDL: CPT | Performed by: SURGERY

## 2023-09-22 PROCEDURE — 11042 DBRDMT SUBQ TIS 1ST 20SQCM/<: CPT | Performed by: SURGERY

## 2023-09-22 RX ORDER — OXYCODONE HYDROCHLORIDE 15 MG/1
15 TABLET ORAL EVERY 6 HOURS PRN
Qty: 120 TABLET | Refills: 0 | Status: SHIPPED | OUTPATIENT
Start: 2023-09-22

## 2023-09-22 RX ORDER — LIDOCAINE HYDROCHLORIDE 40 MG/ML
5 SOLUTION TOPICAL ONCE
Status: COMPLETED | OUTPATIENT
Start: 2023-09-22 | End: 2023-09-22

## 2023-09-22 RX ORDER — ASPIRIN 81 MG/1
81 TABLET ORAL DAILY
Qty: 90 TABLET | Refills: 0 | Status: SHIPPED | OUTPATIENT
Start: 2023-09-22

## 2023-09-22 RX ORDER — IBUPROFEN 800 MG/1
800 TABLET ORAL EVERY 8 HOURS PRN
Qty: 60 TABLET | Refills: 0 | Status: SHIPPED | OUTPATIENT
Start: 2023-09-22

## 2023-09-22 RX ADMIN — LIDOCAINE HYDROCHLORIDE 5 ML: 40 SOLUTION TOPICAL at 10:41

## 2023-09-22 NOTE — ASSESSMENT & PLAN NOTE
The back wound has increased significantly. There is evidence of pressure injury with some eschar around the edges. The wound is opened up. This area was debrided with a scalpel. This correlates to the CT scan finding. Due to its size now with some undermining we will order a VAC dressing.

## 2023-09-22 NOTE — PATIENT INSTRUCTIONS
Orders Placed This Encounter   Procedures    Wound cleansing and dressings         Shower, No. Do not get dressings wet. Left hips wound:  Cleanse/Irrigate wound with normal saline. Cover with silicon bordered foam.   Change three times a week. Wound Vac to be ordered by the wound center for the right back wound. When wound vac is received, follow instructions below. STOP Dakin's. Apply skin prep to all skin around wound and under drape. Apply alginate AG to the yellow/pink area around the wound. Cover with drape. Place black foam into wound bed, and bridge to abdomen. Cover with drape. Wound Vac to run at 125mmhg continuous. Change wound vac three times a week. R lateral back:   Apply calmoseptine to periwound, including red/yellow area around wound. Lightly pack wound with Dakin's soaked rolled gauze. Cover with ABD   Secure with paper tape of medfix tape only. Change daily or as needed for drainage. Perineum and Left Buttock Wounds:   Skin prep to periwound. Gently pack with Dakin's soaked gauze. Cover with ABD's. Change dressings daily. You need to keep pressure off of right back wound. Continue VNA three daily for dressing changes, except on the day the patient goes to the wound center. OFF-LOADING   Avoid pressure at wound site. - all wounds, including right back   Wheelchair Cushion. - ROHO cushion   Do Not Sit for Long Periods of Time. - 1 hr max for meals only, otherwise in bed and turn side to side at least   every 3 hours or more frequently   Reposition in regular bed for now at least every 1-2 hours while awake. Follow up at the wound center in 3 weeks. Today's Wound treatment note: Cleansed with NSS and dressed as above.      Standing Status:   Future     Standing Expiration Date:   9/22/2024

## 2023-09-22 NOTE — PROGRESS NOTES
Patient ID: Renee Walsh is a 59 y.o. male Date of Birth 1960     Chief Complaint  Chief Complaint   Patient presents with   • Follow Up Wound Care Visit     Buttock and back wounds       Allergies  Patient has no known allergies. Assessment:    Stage IV pressure ulcer of right lower back (HCC)  The back wound has increased significantly. There is evidence of pressure injury with some eschar around the edges. The wound is opened up. This area was debrided with a scalpel. This correlates to the CT scan finding. Due to its size now with some undermining we will order a VAC dressing. Stage IV pressure ulcer of sacral region [facility])  With the greenish discoloration to the dressings we will switch to Dakin's daily. Follow-up in 2 to 3 weeks       Diagnoses and all orders for this visit:    Stage IV pressure ulcer of right lower back (HCC)  -     lidocaine (XYLOCAINE) 4 % topical solution 5 mL  -     Wound cleansing and dressings; Future    Type 2 diabetes mellitus without complication, without long-term current use of insulin ([facility])  -     Wound cleansing and dressings; Future    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 ([facility])  -     Wound cleansing and dressings; Future    Left perineal ischial pressure ulcer, stage IV ([facility])  -     Wound cleansing and dressings; Future    Stage IV pressure ulcer of sacral region [facility])    Other orders  -     Debridement              Debridement   Wound 07/29/21 Pressure Injury Back Right; Lower; Lateral    Universal Protocol:  Consent given by: patient  Time out: Immediately prior to procedure a "time out" was called to verify the correct patient, procedure, equipment, support staff and site/side marked as required.     Performed by: physician  Debridement type: surgical  Level of debridement: subcutaneous tissue  Pain control: lidocaine 4%  Post-debridement measurements  Length (cm): 4.7  Width (cm): 9  Depth (cm): 4.5  Percent debrided: 50%  Surface Area (cm^2): 42.3  Area debrided (cm^2): 21.15  Volume (cm^3): 190.35  Tissue and other material debrided: subcutaneous tissue  Devitalized tissue debrided: biofilm and slough  Instrument(s) utilized: curette and blade  Procedural pain (0-10): 1  Post-procedural pain: 1   Response to treatment: procedure was tolerated well          Plan:     Wound 08/26/20 Pressure Injury Buttocks Left (Active)   Wound Image Images linked 09/22/23 0951   Wound Description Pink;Probes to bone;Yellow 09/22/23 0953   Shelby-wound Assessment Intact;Scar Tissue 09/22/23 0953   Wound Length (cm) 2.5 cm 09/22/23 0953   Wound Width (cm) 2 cm 09/22/23 0953   Wound Depth (cm) 1 cm 09/22/23 0953   Wound Surface Area (cm^2) 5 cm^2 09/22/23 0953   Wound Volume (cm^3) 5 cm^3 09/22/23 0953   Calculated Wound Volume (cm^3) 5 cm^3 09/22/23 0953   Change in Wound Size % 82.64 09/22/23 0953   Tunneling in depth located at communicates with perineal wound at 3:00 09/22/23 0953   Undermining 5 09/22/23 0953   Undermining is depth extending from 6-12 deepest at 11:00 09/22/23 0953   Drainage Amount Moderate 09/22/23 0953   Drainage Description Green 09/22/23 0953       Wound 08/26/20 Pressure Injury Hip Left (Active)   Wound Image Images linked 09/22/23 0951   Wound Description Epithelialization;Pink 09/22/23 0952   Shelby-wound Assessment Dry; Intact;Scaly 09/22/23 0952   Wound Length (cm) 0.1 cm 09/22/23 0952   Wound Width (cm) 0.1 cm 09/22/23 0952   Wound Depth (cm) 0.1 cm 09/22/23 0952   Wound Surface Area (cm^2) 0.01 cm^2 09/22/23 0952   Wound Volume (cm^3) 0.001 cm^3 09/22/23 0952   Calculated Wound Volume (cm^3) 0 cm^3 09/22/23 0952   Change in Wound Size % 100 09/22/23 0952   Drainage Amount Scant 09/22/23 0952   Drainage Description Serosanguineous 09/22/23 0952   Non-staged Wound Description Full thickness 09/22/23 0952   Treatments Irrigation with NSS 09/22/23 0952       Wound 07/29/21 Pressure Injury Back Right; Lower; Lateral (Active)   Wound Image Images linked 09/22/23 1045   Wound Description DISHA; Slough;Pink (unable to assess deepest part of wound) 09/22/23 1002   Shelby-wound Assessment Intact 09/22/23 1002   Wound Length (cm) 4.7 cm 09/22/23 1002   Wound Width (cm) 9 cm 09/22/23 1002   Wound Depth (cm) 4.5 cm 09/22/23 1002   Wound Surface Area (cm^2) 42.3 cm^2 09/22/23 1002   Wound Volume (cm^3) 190.35 cm^3 09/22/23 1002   Calculated Wound Volume (cm^3) 190.35 cm^3 09/22/23 1002   Change in Wound Size % -996.49 09/22/23 1002   Tunneling in depth located at 5.2 @ 11:00 09/22/23 1002   Drainage Amount Copious 09/22/23 1002   Drainage Description Tan;Foul smelling 09/22/23 1002   Non-staged Wound Description Full thickness 09/22/23 1002   Treatments Irrigation with NSS 09/22/23 1002       Wound 11/05/21 Pressure Injury Perineum (Active)   Wound Image Images linked 09/22/23 0951   Shelby-wound Assessment Intact;Scar Tissue 09/22/23 0956   Wound Length (cm) 1.5 cm 09/22/23 0956   Wound Width (cm) 1.5 cm 09/22/23 0956   Wound Depth (cm) 1.8 cm 09/22/23 0956   Wound Surface Area (cm^2) 2.25 cm^2 09/22/23 0956   Wound Volume (cm^3) 4.05 cm^3 09/22/23 0956   Calculated Wound Volume (cm^3) 4.05 cm^3 09/22/23 0956   Change in Wound Size % -3950 09/22/23 0956   Tunneling in depth located at communicates with left buttock wound at 9:00 09/22/23 0956   Undermining 4 09/22/23 0956   Undermining is depth extending from 11-6 09/22/23 0956   Drainage Amount Large 09/22/23 0956   Drainage Description Green 09/22/23 0956   Non-staged Wound Description Full thickness 09/22/23 0956   Treatments Irrigation with NSS 09/22/23 0956       Wound 08/26/20 Pressure Injury Buttocks Left (Active)   Date First Assessed/Time First Assessed: 08/26/20 1056   Primary Wound Type: Pressure Injury  Location: Buttocks  Wound Location Orientation: Left  Wound Outcome: Palliative       Wound 08/26/20 Pressure Injury Hip Left (Active)   Date First Assessed/Time First Assessed: 08/26/20 1057   Primary Wound Type: Pressure Injury  Location: Hip  Wound Location Orientation: Left  Wound Outcome: Palliative       Wound 07/29/21 Pressure Injury Back Right; Lower; Lateral (Active)   Date First Assessed: 07/29/21   Primary Wound Type: Pressure Injury  Location: Back  Wound Location Orientation: Right; Lower; Lateral  Wound Outcome: Palliative       Wound 11/05/21 Pressure Injury Perineum (Active)   Date First Assessed/Time First Assessed: 11/05/21 1059   Primary Wound Type: Pressure Injury  Location: Perineum  Wound Outcome: Palliative       [REMOVED] Wound 08/26/19 Buttocks Left;Distal;Lateral (Removed)   Resolved Date/Resolved Time: 08/26/20 1056  Date First Assessed/Time First Assessed: 08/26/19 1130   Pre-Existing Wound: Yes  Location: Buttocks  Wound Location Orientation: Left;Distal;Lateral  Wound Description (Comments): Deep ischial wound       [REMOVED] Wound 09/16/19 Buttocks Right;Distal (Removed)   Resolved Date/Resolved Time: 08/26/20 1055  Date First Assessed/Time First Assessed: 09/16/19 1657   Pre-Existing Wound: Yes  Location: Buttocks  Wound Location Orientation: Right;Distal       [REMOVED] Wound 10/28/19 Knee Left (Removed)   Resolved Date/Resolved Time: 08/26/20 1055  Date First Assessed/Time First Assessed: 10/28/19 0657   Pre-Existing Wound: Yes  Location: Knee  Wound Location Orientation: Left  Wound Description (Comments): round black  Dressing Status: Open to air       [REMOVED] Wound 10/28/19 Buttocks Left;Mid (Removed)   Resolved Date/Resolved Time: 08/26/20 1056  Date First Assessed/Time First Assessed: 10/28/19 1108   Pre-Existing Wound: Yes  Location: Buttocks  Wound Location Orientation: Left;Mid  Wound Description (Comments): Stage 2 vs traumatic injury       [REMOVED] Wound 11/01/19 Other (Comment) N/A (Removed)   Resolved Date/Resolved Time: 08/26/20 1055  Date First Assessed/Time First Assessed: 11/01/19 1640   Location: Other (Comment)  Wound Location Orientation: N/A  Wound Description (Comments): scrotum dressed with exofin       [REMOVED] Wound 05/27/22 Skin Tear Buttocks Left;Proximal (Removed)   Resolved Date/Resolved Time: 08/05/22 0851  Date First Assessed/Time First Assessed: 05/27/22 0946   Primary Wound Type: Skin Tear  Location: Buttocks  Wound Location Orientation: Left;Proximal  Wound Outcome: Healed       [REMOVED] Wound 03/17/23 Pressure Injury Hip Lateral;Left;Proximal (Removed)   Resolved Date: 08/21/23  Date First Assessed/Time First Assessed: 03/17/23 1039   Primary Wound Type: Pressure Injury  Location: Hip  Wound Location Orientation: Lateral;Left;Proximal  Wound Outcome: Healed       [REMOVED] Wound 06/19/23 Pressure Injury Hip Left;Medial (Removed)   Resolved Date: 09/18/23  Date First Assessed: 06/19/23   Present on Original Admission: No  Primary Wound Type: Pressure Injury  Location: Hip  Wound Location Orientation: Left;Medial  Wound Description (Comments): granulation  yellow serous drainage . .. Subjective:      . Mr. Adriana Wesley is a 28-year-old gentleman with paraplegia and history of multiple pressure wounds with 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. He had been rescheduled for a debridement and flap closure by plastics in August but developed a new wound on his right mid to lower back chest wall. This wound probes deep and has been closed on the outside but the tract has not been improving. He had a CT scan that shows a deep tracking to the muscle but no fistulization to the internal thoracic cavity. 02/04/2022 he returns now for re-evaluation. He still has had visiting nurses but has not been seen in the [address] since November. He denies any change or complaint    03/11/2022 no changes since been seen last.  No new complaints. 04/22/2022 no issues. No changes. 05/27/2022. Doing well. Denies fevers or chills. No issues spoke about plastics a re-evaluation but he wants to wait for COVID to wane more and maybe next fall    07/22/2022.   He has not been seen here since May mostly due to transportation issues. He has significant more pain and drainage on his right back chest wall wound. Otherwise no changes    08/05/2022 denies fevers or chills. Still in pain on the right side. 3/17/2023 he returns for evaluation. He was seen in the wound center by another provider month or 2 ago. 6/9/2023. Here for long-term follow-up. No significant changes. 7/14/2023. No changes. Doing well.    9/22/2023 he had an outpatient CT scan. The right chest wall wound has increasing drainage. The dressings from the sacral area have some greenish color      The following portions of the patient's history were reviewed and updated as appropriate: allergies, current medications, past family history, past medical history, past social history, past surgical history and problem list.    Review of Systems   Constitutional: Negative for chills and fever. HENT: Negative for ear pain and sore throat. Eyes: Negative for pain and visual disturbance. Respiratory: Negative for cough and shortness of breath. Cardiovascular: Negative for chest pain and palpitations. Gastrointestinal: Negative for abdominal pain and vomiting. Skin: Negative for color change and rash. Neurological: Positive for weakness and numbness. Psychiatric/Behavioral: Negative for agitation and behavioral problems. All other systems reviewed and are negative.         Objective:       Wound 08/26/20 Pressure Injury Buttocks Left (Active)   Wound Image Images linked 09/22/23 0951   Wound Description Pink;Probes to bone;Yellow 09/22/23 0953   Shelby-wound Assessment Intact;Scar Tissue 09/22/23 0953   Wound Length (cm) 2.5 cm 09/22/23 0953   Wound Width (cm) 2 cm 09/22/23 0953   Wound Depth (cm) 1 cm 09/22/23 0953   Wound Surface Area (cm^2) 5 cm^2 09/22/23 0953   Wound Volume (cm^3) 5 cm^3 09/22/23 0953   Calculated Wound Volume (cm^3) 5 cm^3 09/22/23 0953   Change in Wound Size % 82.64 09/22/23 9482   Tunneling in depth located at communicates with perineal wound at 3:00 09/22/23 0953   Undermining 5 09/22/23 0953   Undermining is depth extending from 6-12 deepest at 11:00 09/22/23 0953   Drainage Amount Moderate 09/22/23 0953   Drainage Description Green 09/22/23 0953       Wound 08/26/20 Pressure Injury Hip Left (Active)   Wound Image Images linked 09/22/23 0951   Wound Description Epithelialization;Pink 09/22/23 0952   Shelby-wound Assessment Dry; Intact;Scaly 09/22/23 0952   Wound Length (cm) 0.1 cm 09/22/23 0952   Wound Width (cm) 0.1 cm 09/22/23 0952   Wound Depth (cm) 0.1 cm 09/22/23 0952   Wound Surface Area (cm^2) 0.01 cm^2 09/22/23 0952   Wound Volume (cm^3) 0.001 cm^3 09/22/23 0952   Calculated Wound Volume (cm^3) 0 cm^3 09/22/23 0952   Change in Wound Size % 100 09/22/23 0952   Drainage Amount Scant 09/22/23 0952   Drainage Description Serosanguineous 09/22/23 0952   Non-staged Wound Description Full thickness 09/22/23 0952   Treatments Irrigation with NSS 09/22/23 0952       Wound 07/29/21 Pressure Injury Back Right; Lower; Lateral (Active)   Wound Image Images linked 09/22/23 1045   Wound Description DISHA; Slough;Pink (unable to assess deepest part of wound) 09/22/23 1002   Shelby-wound Assessment Intact 09/22/23 1002   Wound Length (cm) 4.7 cm 09/22/23 1002   Wound Width (cm) 9 cm 09/22/23 1002   Wound Depth (cm) 4.5 cm 09/22/23 1002   Wound Surface Area (cm^2) 42.3 cm^2 09/22/23 1002   Wound Volume (cm^3) 190.35 cm^3 09/22/23 1002   Calculated Wound Volume (cm^3) 190.35 cm^3 09/22/23 1002   Change in Wound Size % -996.49 09/22/23 1002   Tunneling in depth located at 5.2 @ 11:00 09/22/23 1002   Drainage Amount Copious 09/22/23 1002   Drainage Description Tan;Foul smelling 09/22/23 1002   Non-staged Wound Description Full thickness 09/22/23 1002   Treatments Irrigation with NSS 09/22/23 1002       Wound 11/05/21 Pressure Injury Perineum (Active)   Wound Image Images linked 09/22/23 0951   Shelby-wound Assessment Intact;Scar Tissue 09/22/23 0956   Wound Length (cm) 1.5 cm 09/22/23 0956   Wound Width (cm) 1.5 cm 09/22/23 0956   Wound Depth (cm) 1.8 cm 09/22/23 0956   Wound Surface Area (cm^2) 2.25 cm^2 09/22/23 0956   Wound Volume (cm^3) 4.05 cm^3 09/22/23 0956   Calculated Wound Volume (cm^3) 4.05 cm^3 09/22/23 0956   Change in Wound Size % -3950 09/22/23 0956   Tunneling in depth located at communicates with left buttock wound at 9:00 09/22/23 0956   Undermining 4 09/22/23 0956   Undermining is depth extending from 11-6 09/22/23 0956   Drainage Amount Large 09/22/23 0956   Drainage Description Green 09/22/23 0956   Non-staged Wound Description Full thickness 09/22/23 0956   Treatments Irrigation with NSS 09/22/23 0956       /78   Pulse 64   Temp (!) 95.6 °F (35.3 °C)   Resp 16     Physical Exam  Vitals and nursing note reviewed. Exam conducted with a chaperone present. Constitutional:       Appearance: Normal appearance. Cardiovascular:      Rate and Rhythm: Normal rate and regular rhythm. Pulmonary:      Effort: Pulmonary effort is normal.      Breath sounds: Normal breath sounds. Abdominal:      General: There is no distension. Palpations: Abdomen is soft. There is no mass. Tenderness: There is no abdominal tenderness. Comments: Ostomy   Musculoskeletal:      Comments: Paraplegia. Right hip disarticulation and removal   Skin:     General: Skin is warm and dry. Comments: See complete wound assessment   Neurological:      Mental Status: He is alert. Psychiatric:         Mood and Affect: Mood normal.         Behavior: Behavior normal.           Wound Instructions:  Orders Placed This Encounter   Procedures   • Wound cleansing and dressings         Shower, No. Do not get dressings wet. Left hips wound:  Cleanse/Irrigate wound with normal saline. Cover with silicon bordered foam.   Change three times a week.     Wound Vac to be ordered by the wound center for the right back wound. When wound vac is received, follow instructions below. STOP Dakin's. Apply skin prep to all skin around wound and under drape. Apply alginate AG to the yellow/pink area around the wound. Cover with drape. Place black foam into wound bed, and bridge to abdomen. Cover with drape. Wound Vac to run at 125mmhg continuous. Change wound vac three times a week.        R lateral back:   Apply calmoseptine to periwound, including red/yellow area around wound. Lightly pack wound with Dakin's soaked rolled gauze. Cover with ABD   Secure with paper tape of medfix tape only. Change daily or as needed for drainage.       Perineum and Left Buttock Wounds:   Skin prep to periwound. Gently pack with Dakin's soaked gauze. Cover with ABD's. Change dressings daily.        You need to keep pressure off of right back wound.       Continue VNA three daily for dressing changes, except on the day the patient goes to the wound center.       OFF-LOADING   Avoid pressure at wound site. - all wounds, including right back   Wheelchair Cushion. - ROHO cushion   Do Not Sit for Long Periods of Time. - 1 hr max for meals only, otherwise in bed and turn side to side at least   every 3 hours or more frequently   Reposition in regular bed for now at least every 1-2 hours while awake.        Follow up at the wound center in 3 weeks.       Today's Wound treatment note: Cleansed with NSS and dressed as above. Standing Status:   Future     Standing Expiration Date:   9/22/2024   • Debridement     This order was created via procedure documentation        Diagnosis ICD-10-CM Associated Orders   1. Stage IV pressure ulcer of right lower back ([facility])  L89.134 lidocaine (XYLOCAINE) 4 % topical solution 5 mL     Wound cleansing and dressings      2. Type 2 diabetes mellitus without complication, without long-term current use of insulin ([facility])  E11.9 Wound cleansing and dressings      3.  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 ([facility])  [id number] Wound cleansing and dressings      4. Left perineal ischial pressure ulcer, stage IV ([facility])  L89.324 Wound cleansing and dressings      5.  Stage IV pressure ulcer of sacral region [facility])  L89.154

## 2023-09-22 NOTE — ASSESSMENT & PLAN NOTE
With the greenish discoloration to the dressings we will switch to Dakin's daily.   Follow-up in 2 to 3 weeks

## 2023-09-23 ENCOUNTER — HOME CARE VISIT (OUTPATIENT)
Dept: HOME HEALTH SERVICES | Facility: HOME HEALTHCARE | Age: 63
End: 2023-09-23
Payer: MEDICARE

## 2023-09-23 PROCEDURE — G0299 HHS/HOSPICE OF RN EA 15 MIN: HCPCS

## 2023-09-23 PROCEDURE — 400014 VN F/U

## 2023-09-24 ENCOUNTER — HOME CARE VISIT (OUTPATIENT)
Dept: HOME HEALTH SERVICES | Facility: HOME HEALTHCARE | Age: 63
End: 2023-09-24
Payer: MEDICARE

## 2023-09-24 PROCEDURE — G0299 HHS/HOSPICE OF RN EA 15 MIN: HCPCS

## 2023-09-25 ENCOUNTER — HOME CARE VISIT (OUTPATIENT)
Dept: HOME HEALTH SERVICES | Facility: HOME HEALTHCARE | Age: 63
End: 2023-09-25
Payer: MEDICARE

## 2023-09-25 VITALS
TEMPERATURE: 97.3 F | HEART RATE: 78 BPM | RESPIRATION RATE: 18 BRPM | SYSTOLIC BLOOD PRESSURE: 110 MMHG | OXYGEN SATURATION: 96 % | DIASTOLIC BLOOD PRESSURE: 60 MMHG

## 2023-09-25 VITALS
RESPIRATION RATE: 18 BRPM | SYSTOLIC BLOOD PRESSURE: 118 MMHG | HEART RATE: 76 BPM | DIASTOLIC BLOOD PRESSURE: 70 MMHG | OXYGEN SATURATION: 97 % | TEMPERATURE: 98.1 F

## 2023-09-25 VITALS
RESPIRATION RATE: 12 BRPM | TEMPERATURE: 96.5 F | SYSTOLIC BLOOD PRESSURE: 140 MMHG | DIASTOLIC BLOOD PRESSURE: 68 MMHG | OXYGEN SATURATION: 97 % | HEART RATE: 60 BPM

## 2023-09-25 PROCEDURE — G0300 HHS/HOSPICE OF LPN EA 15 MIN: HCPCS

## 2023-09-27 ENCOUNTER — HOME CARE VISIT (OUTPATIENT)
Dept: HOME HEALTH SERVICES | Facility: HOME HEALTHCARE | Age: 63
End: 2023-09-27
Payer: MEDICARE

## 2023-09-27 VITALS
OXYGEN SATURATION: 97 % | RESPIRATION RATE: 20 BRPM | DIASTOLIC BLOOD PRESSURE: 78 MMHG | TEMPERATURE: 97.8 F | SYSTOLIC BLOOD PRESSURE: 142 MMHG

## 2023-09-27 PROCEDURE — G0299 HHS/HOSPICE OF RN EA 15 MIN: HCPCS

## 2023-09-29 ENCOUNTER — HOME CARE VISIT (OUTPATIENT)
Dept: HOME HEALTH SERVICES | Facility: HOME HEALTHCARE | Age: 63
End: 2023-09-29
Payer: MEDICARE

## 2023-09-29 PROCEDURE — G0299 HHS/HOSPICE OF RN EA 15 MIN: HCPCS

## 2023-10-01 VITALS
DIASTOLIC BLOOD PRESSURE: 68 MMHG | TEMPERATURE: 96.9 F | OXYGEN SATURATION: 98 % | HEART RATE: 72 BPM | RESPIRATION RATE: 18 BRPM | SYSTOLIC BLOOD PRESSURE: 110 MMHG

## 2023-10-02 ENCOUNTER — HOME CARE VISIT (OUTPATIENT)
Dept: HOME HEALTH SERVICES | Facility: HOME HEALTHCARE | Age: 63
End: 2023-10-02
Payer: MEDICARE

## 2023-10-02 PROCEDURE — G0299 HHS/HOSPICE OF RN EA 15 MIN: HCPCS

## 2023-10-03 VITALS
HEART RATE: 78 BPM | OXYGEN SATURATION: 97 % | RESPIRATION RATE: 18 BRPM | SYSTOLIC BLOOD PRESSURE: 110 MMHG | DIASTOLIC BLOOD PRESSURE: 60 MMHG | TEMPERATURE: 97.5 F

## 2023-10-04 ENCOUNTER — HOME CARE VISIT (OUTPATIENT)
Dept: HOME HEALTH SERVICES | Facility: HOME HEALTHCARE | Age: 63
End: 2023-10-04
Payer: MEDICARE

## 2023-10-04 PROCEDURE — G0299 HHS/HOSPICE OF RN EA 15 MIN: HCPCS

## 2023-10-06 ENCOUNTER — HOME CARE VISIT (OUTPATIENT)
Dept: HOME HEALTH SERVICES | Facility: HOME HEALTHCARE | Age: 63
End: 2023-10-06
Payer: MEDICARE

## 2023-10-06 VITALS
TEMPERATURE: 97.8 F | DIASTOLIC BLOOD PRESSURE: 60 MMHG | HEART RATE: 78 BPM | SYSTOLIC BLOOD PRESSURE: 110 MMHG | RESPIRATION RATE: 18 BRPM | OXYGEN SATURATION: 97 %

## 2023-10-06 PROCEDURE — G0299 HHS/HOSPICE OF RN EA 15 MIN: HCPCS

## 2023-10-08 VITALS
TEMPERATURE: 97.6 F | HEART RATE: 74 BPM | DIASTOLIC BLOOD PRESSURE: 60 MMHG | SYSTOLIC BLOOD PRESSURE: 110 MMHG | RESPIRATION RATE: 16 BRPM | OXYGEN SATURATION: 97 %

## 2023-10-09 ENCOUNTER — TELEPHONE (OUTPATIENT)
Dept: FAMILY MEDICINE CLINIC | Facility: CLINIC | Age: 63
End: 2023-10-09

## 2023-10-09 ENCOUNTER — HOME CARE VISIT (OUTPATIENT)
Dept: HOME HEALTH SERVICES | Facility: HOME HEALTHCARE | Age: 63
End: 2023-10-09
Payer: MEDICARE

## 2023-10-09 PROCEDURE — G0299 HHS/HOSPICE OF RN EA 15 MIN: HCPCS

## 2023-10-09 NOTE — TELEPHONE ENCOUNTER
PCP SIGNATURE NEEDED FOR [employer] RECEIVED VIA FAX AND PLACED IN PCP FOLDER TO BE DELIVERED AT ASSIGNED TIMES.     Equipment required repair

## 2023-10-10 VITALS
SYSTOLIC BLOOD PRESSURE: 118 MMHG | RESPIRATION RATE: 20 BRPM | TEMPERATURE: 97.4 F | HEART RATE: 80 BPM | DIASTOLIC BLOOD PRESSURE: 70 MMHG | OXYGEN SATURATION: 98 %

## 2023-10-11 ENCOUNTER — HOME CARE VISIT (OUTPATIENT)
Dept: HOME HEALTH SERVICES | Facility: HOME HEALTHCARE | Age: 63
End: 2023-10-11
Payer: MEDICARE

## 2023-10-11 PROCEDURE — G0299 HHS/HOSPICE OF RN EA 15 MIN: HCPCS

## 2023-10-12 VITALS
TEMPERATURE: 97.3 F | DIASTOLIC BLOOD PRESSURE: 60 MMHG | RESPIRATION RATE: 18 BRPM | OXYGEN SATURATION: 98 % | HEART RATE: 84 BPM | SYSTOLIC BLOOD PRESSURE: 112 MMHG

## 2023-10-13 ENCOUNTER — HOME CARE VISIT (OUTPATIENT)
Dept: HOME HEALTH SERVICES | Facility: HOME HEALTHCARE | Age: 63
End: 2023-10-13
Payer: MEDICARE

## 2023-10-13 ENCOUNTER — OFFICE VISIT (OUTPATIENT)
Dept: WOUND CARE | Facility: CLINIC | Age: 63
End: 2023-10-13
Payer: MEDICARE

## 2023-10-13 VITALS
HEART RATE: 72 BPM | RESPIRATION RATE: 16 BRPM | TEMPERATURE: 97.6 F | SYSTOLIC BLOOD PRESSURE: 126 MMHG | DIASTOLIC BLOOD PRESSURE: 78 MMHG

## 2023-10-13 DIAGNOSIS — L89.324 LEFT PERINEAL ISCHIAL PRESSURE ULCER, STAGE IV (HCC): ICD-10-CM

## 2023-10-13 DIAGNOSIS — E11.22 CONTROLLED TYPE 2 DIABETES MELLITUS WITH STAGE 1 CHRONIC KIDNEY DISEASE, UNSPECIFIED WHETHER LONG TERM INSULIN USE: ICD-10-CM

## 2023-10-13 DIAGNOSIS — G82.20 PARAPLEGIC SPINAL PARALYSIS (HCC): ICD-10-CM

## 2023-10-13 DIAGNOSIS — L89.223 STAGE III PRESSURE ULCER OF LEFT HIP (HCC): ICD-10-CM

## 2023-10-13 DIAGNOSIS — L89.134 STAGE IV PRESSURE ULCER OF RIGHT LOWER BACK (HCC): Primary | ICD-10-CM

## 2023-10-13 DIAGNOSIS — L89.154 STAGE IV PRESSURE ULCER OF SACRAL REGION (HCC): ICD-10-CM

## 2023-10-13 DIAGNOSIS — L89.324 DECUBITUS ULCER OF ISCHIUM, LEFT, STAGE IV (HCC): ICD-10-CM

## 2023-10-13 DIAGNOSIS — N18.1 CONTROLLED TYPE 2 DIABETES MELLITUS WITH STAGE 1 CHRONIC KIDNEY DISEASE, UNSPECIFIED WHETHER LONG TERM INSULIN USE: ICD-10-CM

## 2023-10-13 PROCEDURE — 11042 DBRDMT SUBQ TIS 1ST 20SQCM/<: CPT | Performed by: SURGERY

## 2023-10-13 NOTE — ASSESSMENT & PLAN NOTE
Whole perineal sacral ischial area the wounds all connect under some skin flaps. Still some greenish drainage we will continue with the Dakin's daily and follow-up in 2 weeks. Still in the process of getting him a hospital air mattress with difficulty from the company.

## 2023-10-13 NOTE — PROGRESS NOTES
Patient ID: Mildred Barger is a 59 y.o. male Date of Birth 1960     Chief Complaint  Chief Complaint   Patient presents with    Follow Up Wound Care Visit     Follow up visit for multiple chronic wounds. Pt arrives with wound vac intact to right side. Dressing intact to buttocks and perineum. Allergies  Patient has no known allergies. Assessment:    Stage IV pressure ulcer of sacral region ([address])  Whole perineal sacral ischial area the wounds all connect under some skin flaps. Still some greenish drainage we will continue with the Dakin's daily and follow-up in 2 weeks. Still in the process of getting him a hospital air mattress with difficulty from the company. Stage IV pressure ulcer of right lower back (HCC)  Tolerating the VAC dressing. Continue the same care. Diagnoses and all orders for this visit:    Stage IV pressure ulcer of right lower back (HCC)  -     Cancel: Wound cleansing and dressings; Future  -     Wound cleansing and dressings; Future    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)  -     Cancel: Wound cleansing and dressings; Future  -     Wound cleansing and dressings; Future    Left perineal ischial pressure ulcer, stage IV (HCC)  -     Cancel: Wound cleansing and dressings; Future  -     Wound cleansing and dressings; Future    Stage IV pressure ulcer of sacral region ([address])  -     Cancel: Wound cleansing and dressings; Future  -     Wound cleansing and dressings; Future    Controlled type 2 diabetes mellitus with stage 1 chronic kidney disease, unspecified whether long term insulin use     Paraplegic spinal paralysis (HCC)    Decubitus ulcer of ischium, left, stage IV ([address])    Other orders  -     Debridement              Debridement   Wound 08/26/20 Pressure Injury Buttocks Left    Universal Protocol:  Consent: Verbal consent obtained.   Consent given by: patient  Time out: Immediately prior to procedure a "time out" was called to verify the correct patient, procedure, equipment, support staff and site/side marked as required. Performed by: physician  Debridement type: surgical  Level of debridement: subcutaneous tissue  Pain control: lidocaine 4%  Post-debridement measurements  Length (cm): 3.5  Width (cm): 2  Depth (cm): 7  Percent debrided: 100%  Surface Area (cm^2): 7  Area debrided (cm^2): 7  Volume (cm^3): 49  Tissue and other material debrided: subcutaneous tissue  Devitalized tissue debrided: biofilm and slough  Instrument(s) utilized: curette  Procedural pain (0-10): insensate  Post-procedural pain: insensate   Response to treatment: procedure was tolerated well        Plan:     Wound 08/26/20 Pressure Injury Buttocks Left (Active)   Wound Image Images linked 10/13/23 1100   Wound Description Pale;Pink 10/13/23 1039   Shelby-wound Assessment Maceration;Scar Tissue 10/13/23 1039   Wound Length (cm) 3.5 cm 10/13/23 1039   Wound Width (cm) 2 cm 10/13/23 1039   Wound Surface Area (cm^2) 7 cm^2 10/13/23 1039   Tunneling in depth located at Communicates with Perineal wound @ 3 o'clock 10/13/23 1039   Undermining 4.9 10/13/23 1039   Undermining is depth extending from 10-11 o'clock. 10/13/23 1039   Drainage Amount Large 10/13/23 1039   Drainage Description Green;Serosanguineous; Tan 10/13/23 1039   Non-staged Wound Description Full thickness 10/13/23 1039   Treatments Irrigation with NSS 10/13/23 1039   Wound packed? Yes 10/13/23 1039   Packing- # removed 1 10/13/23 1039   Patient Tolerance Tolerated well 10/13/23 1039   Dressing Status Removed 10/13/23 1039       Wound 07/29/21 Pressure Injury Back Right; Lower; Lateral (Active)   Wound Image Images linked 10/13/23 1124   Wound Length (cm) 4.7 cm 10/13/23 1046   Wound Width (cm) 8.8 cm 10/13/23 1046   Wound Depth (cm) 4 cm 10/13/23 1046   Wound Surface Area (cm^2) 41.36 cm^2 10/13/23 1046   Wound Volume (cm^3) 165.44 cm^3 10/13/23 1046   Calculated Wound Volume (cm^3) 165.44 cm^3 10/13/23 1046   Change in Wound Size % -853 10/13/23 1046   Tunneling 5.5 cm 10/13/23 1046   Tunneling in depth located at 11 o'clock 10/13/23 1046   Undermining 4.5 10/13/23 1046   Undermining is depth extending from 10-5 o'clock, deepest @ 3 o'clock 10/13/23 1046   Drainage Amount Large 10/13/23 1046   Drainage Description Green;Tan;Serosanguineous 10/13/23 1046   Non-staged Wound Description Full thickness 10/13/23 1046   Treatments Irrigation with NSS 10/13/23 1046   Patient Tolerance Tolerated well 10/13/23 1046   Dressing Status Removed 10/13/23 1046       Wound 11/05/21 Pressure Injury Perineum (Active)   Wound Image Images linked 10/13/23 1034   Wound Description Pink;Dusky 10/13/23 1037   Wound Length (cm) 1.5 cm 10/13/23 1037   Wound Width (cm) 1 cm 10/13/23 1037   Wound Depth (cm) 1.6 cm 10/13/23 1037   Wound Surface Area (cm^2) 1.5 cm^2 10/13/23 1037   Wound Volume (cm^3) 2.4 cm^3 10/13/23 1037   Calculated Wound Volume (cm^3) 2.4 cm^3 10/13/23 1037   Change in Wound Size % -2300 10/13/23 1037   Tunneling 0 cm 10/13/23 1037   Tunneling in depth located at communicates with left buttock at 9 o'clock. 10/13/23 1037   Undermining 4 10/13/23 1037   Undermining is depth extending from 12-5 o'clock, deepest @ 2 10/13/23 1037   Drainage Amount Large 10/13/23 1037   Drainage Description Green;Serosanguineous; Tan 10/13/23 1037   Patient Tolerance Tolerated well 10/13/23 1037   Dressing Status Removed 10/13/23 1037       Wound 10/13/23 Pressure Injury Buttocks Right (Active)   Wound Image Images linked 10/13/23 1035   Wound Description Pink;Yellow 10/13/23 1035   Shelby-wound Assessment Maceration;Scar Tissue 10/13/23 1035   Wound Length (cm) 1 cm 10/13/23 1035   Wound Width (cm) 0.7 cm 10/13/23 1035   Wound Depth (cm) 0.2 cm 10/13/23 1035   Wound Surface Area (cm^2) 0.7 cm^2 10/13/23 1035   Wound Volume (cm^3) 0.14 cm^3 10/13/23 1035   Calculated Wound Volume (cm^3) 0.14 cm^3 10/13/23 1035   Drainage Amount Moderate 10/13/23 1035   Drainage Description Serosanguineous; Yellow;Green 10/13/23 1035   Non-staged Wound Description Full thickness 10/13/23 1035   Treatments Irrigation with NSS 10/13/23 1035   Patient Tolerance Tolerated well 10/13/23 1035   Dressing Status Removed 10/13/23 1035       Wound 08/26/20 Pressure Injury Buttocks Left (Active)   Date First Assessed/Time First Assessed: 08/26/20 1056   Primary Wound Type: Pressure Injury  Location: Buttocks  Wound Location Orientation: Left  Wound Outcome: Palliative       Wound 07/29/21 Pressure Injury Back Right; Lower; Lateral (Active)   Date First Assessed: 07/29/21   Primary Wound Type: Pressure Injury  Location: Back  Wound Location Orientation: Right; Lower; Lateral  Wound Outcome: Palliative       Wound 11/05/21 Pressure Injury Perineum (Active)   Date First Assessed/Time First Assessed: 11/05/21 1059   Primary Wound Type: Pressure Injury  Location: Perineum  Wound Outcome: Palliative       Wound 10/13/23 Pressure Injury Buttocks Right (Active)   Date First Assessed/Time First Assessed: 10/13/23 1035   Primary Wound Type: Pressure Injury  Location: Buttocks  Wound Location Orientation: Right       [REMOVED] Wound 08/26/19 Buttocks Left;Distal;Lateral (Removed)   Resolved Date/Resolved Time: 08/26/20 1056  Date First Assessed/Time First Assessed: 08/26/19 1130   Pre-Existing Wound: Yes  Location: Buttocks  Wound Location Orientation: Left;Distal;Lateral  Wound Description (Comments): Deep ischial wound       [REMOVED] Wound 09/16/19 Buttocks Right;Distal (Removed)   Resolved Date/Resolved Time: 08/26/20 1055  Date First Assessed/Time First Assessed: 09/16/19 1657   Pre-Existing Wound: Yes  Location: Buttocks  Wound Location Orientation: Right;Distal       [REMOVED] Wound 10/28/19 Knee Left (Removed)   Resolved Date/Resolved Time: 08/26/20 1055  Date First Assessed/Time First Assessed: 10/28/19 0657   Pre-Existing Wound: Yes  Location: Knee  Wound Location Orientation: Left  Wound Description (Comments): round black  Dressing Status: Open to air       [REMOVED] Wound 10/28/19 Buttocks Left;Mid (Removed)   Resolved Date/Resolved Time: 08/26/20 1056  Date First Assessed/Time First Assessed: 10/28/19 1108   Pre-Existing Wound: Yes  Location: Buttocks  Wound Location Orientation: Left;Mid  Wound Description (Comments): Stage 2 vs traumatic injury       [REMOVED] Wound 11/01/19 Other (Comment) N/A (Removed)   Resolved Date/Resolved Time: 08/26/20 1055  Date First Assessed/Time First Assessed: 11/01/19 1640   Location: Other (Comment)  Wound Location Orientation: N/A  Wound Description (Comments): scrotum dressed with exofin       [REMOVED] Wound 08/26/20 Pressure Injury Hip Left (Removed)   Resolved Date: 10/04/23  Date First Assessed/Time First Assessed: 08/26/20 1057   Primary Wound Type: Pressure Injury  Location: Hip  Wound Location Orientation: Left  Wound Outcome: Palliative       [REMOVED] Wound 05/27/22 Skin Tear Buttocks Left;Proximal (Removed)   Resolved Date/Resolved Time: 08/05/22 0851  Date First Assessed/Time First Assessed: 05/27/22 0946   Primary Wound Type: Skin Tear  Location: Buttocks  Wound Location Orientation: Left;Proximal  Wound Outcome: Healed       [REMOVED] Wound 03/17/23 Pressure Injury Hip Lateral;Left;Proximal (Removed)   Resolved Date: 08/21/23  Date First Assessed/Time First Assessed: 03/17/23 1039   Primary Wound Type: Pressure Injury  Location: Hip  Wound Location Orientation: Lateral;Left;Proximal  Wound Outcome: Healed       [REMOVED] Wound 06/19/23 Pressure Injury Hip Left;Medial (Removed)   Resolved Date: 09/18/23  Date First Assessed: 06/19/23   Present on Original Admission: No  Primary Wound Type: Pressure Injury  Location: Hip  Wound Location Orientation: Left;Medial  Wound Description (Comments): granulation  yellow serous drainage . .. Subjective:      .     Mr. Hayley Cast is a 70-year-old gentleman with paraplegia and history of multiple pressure wounds with 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. He had been rescheduled for a debridement and flap closure by plastics in August but developed a new wound on his right mid to lower back chest wall. This wound probes deep and has been closed on the outside but the tract has not been improving. He had a CT scan that shows a deep tracking to the muscle but no fistulization to the internal thoracic cavity. 02/04/2022 he returns now for re-evaluation. He still has had visiting nurses but has not been seen in the  [facility] since November. He denies any change or complaint    03/11/2022 no changes since been seen last.  No new complaints. 04/22/2022 no issues. No changes. 05/27/2022. Doing well. Denies fevers or chills. No issues spoke about plastics a re-evaluation but he wants to wait for COVID to wane more and maybe next fall 07/22/2022. He has not been seen here since May mostly due to transportation issues. He has significant more pain and drainage on his right back chest wall wound. Otherwise no changes    08/05/2022 denies fevers or chills. Still in pain on the right side. 3/17/2023 he returns for evaluation. He was seen in the wound center by another provider month or 2 ago. 6/9/2023. Here for long-term follow-up. No significant changes. 7/14/2023. No changes. Doing well.    9/22/2023 he had an outpatient CT scan. The right chest wall wound has increasing drainage. The dressings from the sacral area have some greenish color    10/13/2023. He did receive a VAC.   He still has greenish drainage on the dressings      The following portions of the patient's history were reviewed and updated as appropriate: allergies, current medications, past family history, past medical history, past social history, past surgical history and problem list.    Review of Systems      Objective:       Wound 08/26/20 Pressure Injury Buttocks Left (Active)   Wound Image Images linked 10/13/23 1100   Wound Description Pale;Pink 10/13/23 1039   Shelby-wound Assessment Maceration;Scar Tissue 10/13/23 1039   Wound Length (cm) 3.5 cm 10/13/23 1039   Wound Width (cm) 2 cm 10/13/23 1039   Wound Surface Area (cm^2) 7 cm^2 10/13/23 1039   Tunneling in depth located at Communicates with Perineal wound @ 3 o'clock 10/13/23 1039   Undermining 4.9 10/13/23 1039   Undermining is depth extending from 10-11 o'clock. 10/13/23 1039   Drainage Amount Large 10/13/23 1039   Drainage Description Green;Serosanguineous; Tan 10/13/23 1039   Non-staged Wound Description Full thickness 10/13/23 1039   Treatments Irrigation with NSS 10/13/23 1039   Wound packed? Yes 10/13/23 1039   Packing- # removed 1 10/13/23 1039   Patient Tolerance Tolerated well 10/13/23 1039   Dressing Status Removed 10/13/23 1039       Wound 07/29/21 Pressure Injury Back Right; Lower; Lateral (Active)   Wound Image Images linked 10/13/23 1124   Wound Length (cm) 4.7 cm 10/13/23 1046   Wound Width (cm) 8.8 cm 10/13/23 1046   Wound Depth (cm) 4 cm 10/13/23 1046   Wound Surface Area (cm^2) 41.36 cm^2 10/13/23 1046   Wound Volume (cm^3) 165.44 cm^3 10/13/23 1046   Calculated Wound Volume (cm^3) 165.44 cm^3 10/13/23 1046   Change in Wound Size % -853 10/13/23 1046   Tunneling 5.5 cm 10/13/23 1046   Tunneling in depth located at 11 o'clock 10/13/23 1046   Undermining 4.5 10/13/23 1046   Undermining is depth extending from 10-5 o'clock, deepest @ 3 o'clock 10/13/23 1046   Drainage Amount Large 10/13/23 1046   Drainage Description Green;Tan;Serosanguineous 10/13/23 1046   Non-staged Wound Description Full thickness 10/13/23 1046   Treatments Irrigation with NSS 10/13/23 1046   Patient Tolerance Tolerated well 10/13/23 1046   Dressing Status Removed 10/13/23 1046       Wound 11/05/21 Pressure Injury Perineum (Active)   Wound Image Images linked 10/13/23 1034   Wound Description Pink;Dusky 10/13/23 1037   Wound Length (cm) 1.5 cm 10/13/23 1037   Wound Width (cm) 1 cm 10/13/23 1037 Wound Depth (cm) 1.6 cm 10/13/23 1037   Wound Surface Area (cm^2) 1.5 cm^2 10/13/23 1037   Wound Volume (cm^3) 2.4 cm^3 10/13/23 1037   Calculated Wound Volume (cm^3) 2.4 cm^3 10/13/23 1037   Change in Wound Size % -2300 10/13/23 1037   Tunneling 0 cm 10/13/23 1037   Tunneling in depth located at communicates with left buttock at 9 o'clock. 10/13/23 1037   Undermining 4 10/13/23 1037   Undermining is depth extending from 12-5 o'clock, deepest @ 2 10/13/23 1037   Drainage Amount Large 10/13/23 1037   Drainage Description Green;Serosanguineous; Tan 10/13/23 1037   Patient Tolerance Tolerated well 10/13/23 1037   Dressing Status Removed 10/13/23 1037       Wound 10/13/23 Pressure Injury Buttocks Right (Active)   Wound Image Images linked 10/13/23 1035   Wound Description Pink;Yellow 10/13/23 1035   Shelby-wound Assessment Maceration;Scar Tissue 10/13/23 1035   Wound Length (cm) 1 cm 10/13/23 1035   Wound Width (cm) 0.7 cm 10/13/23 1035   Wound Depth (cm) 0.2 cm 10/13/23 1035   Wound Surface Area (cm^2) 0.7 cm^2 10/13/23 1035   Wound Volume (cm^3) 0.14 cm^3 10/13/23 1035   Calculated Wound Volume (cm^3) 0.14 cm^3 10/13/23 1035   Drainage Amount Moderate 10/13/23 1035   Drainage Description Serosanguineous; Yellow;Green 10/13/23 1035   Non-staged Wound Description Full thickness 10/13/23 1035   Treatments Irrigation with NSS 10/13/23 1035   Patient Tolerance Tolerated well 10/13/23 1035   Dressing Status Removed 10/13/23 1035       /78   Pulse 72   Temp 97.6 °F (36.4 °C) (Tympanic)   Resp 16     Physical Exam      Wound Instructions:  Orders Placed This Encounter   Procedures    Wound cleansing and dressings     Shower, No. Do not get dressings wet. R lateral back:   Apply skin prep to all skin around wound and under drape. Apply alginate AG to the yellow/pink area around the wound. Cover with drape. Place black foam into wound bed, and bridge to abdomen. Cover with drape.    Wound Vac to run at 125mmhg continuous. Change wound vac three times a week. Perineum and Left Buttock Wounds:   Skin prep to periwound. Gently pack with Dakin's soaked gauze. Cover with ABD's. Change dressings daily. New open area to right of perirenal wound:  Apply Silver alginate. Cover with gauze. Change dressings daily. If left hip wounds reopen please dress as follows:  Cleanse/Irrigate wound with normal saline. Cover with silicon bordered foam.   Change three times a week. You need to keep pressure off of right back wound. Continue VNA three daily for dressing changes, except on the day the patient goes to the wound center. OFF-LOADING   Avoid pressure at wound site. - all wounds, including right back   Wheelchair Cushion. - ROHO cushion   Do Not Sit for Long Periods of Time. - 1 hr max for meals only, otherwise in bed and turn side to side at least   every 3 hours or more frequently   Reposition in regular bed for now at least every 1-2 hours while awake. Follow up at the wound center in 2 weeks. Today's Wound treatment note: Cleansed with NSS and dressed as above. Standing Status:   Future     Standing Expiration Date:   10/13/2024    Debridement     This order was created via procedure documentation        Diagnosis ICD-10-CM Associated Orders   1. Stage IV pressure ulcer of right lower back ([facility])  L89.134 Wound cleansing and dressings      2. 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 ([facility])  G82.285 Wound cleansing and dressings      3. Left perineal ischial pressure ulcer, stage IV ([facility])  L89.324 Wound cleansing and dressings      4. Stage IV pressure ulcer of sacral region [facility])  L89.154 Wound cleansing and dressings      5. Controlled type 2 diabetes mellitus with stage 1 chronic kidney disease, unspecified whether long term insulin use   E11.22     N18.1       6. Paraplegic spinal paralysis ([address])  G82.20       7.  Decubitus ulcer of ischium, left, stage IV ([facility])  O43.035

## 2023-10-13 NOTE — PATIENT INSTRUCTIONS
Orders Placed This Encounter   Procedures    Wound cleansing and dressings     Shower, No. Do not get dressings wet. R lateral back:   Apply skin prep to all skin around wound and under drape. Apply alginate AG to the yellow/pink area around the wound. Cover with drape. Place black foam into wound bed, and bridge to abdomen. Cover with drape. Wound Vac to run at 125mmhg continuous. Change wound vac three times a week. Perineum and Left Buttock Wounds:   Skin prep to periwound. Gently pack with Dakin's soaked gauze. Cover with ABD's. Change dressings daily. New open area to right of perirenal wound:  Apply Silver alginate. Cover with gauze. Change dressings daily. If left hip wounds reopen please dress as follows:  Cleanse/Irrigate wound with normal saline. Cover with silicon bordered foam.   Change three times a week. You need to keep pressure off of right back wound. Continue VNA three daily for dressing changes, except on the day the patient goes to the wound center. OFF-LOADING   Avoid pressure at wound site. - all wounds, including right back   Wheelchair Cushion. - ROHO cushion   Do Not Sit for Long Periods of Time. - 1 hr max for meals only, otherwise in bed and turn side to side at least   every 3 hours or more frequently   Reposition in regular bed for now at least every 1-2 hours while awake. Follow up at the wound center in 2 weeks. Today's Wound treatment note: Cleansed with NSS and dressed as above.      Standing Status:   Future     Standing Expiration Date:   10/13/2024

## 2023-10-16 ENCOUNTER — HOME CARE VISIT (OUTPATIENT)
Dept: HOME HEALTH SERVICES | Facility: HOME HEALTHCARE | Age: 63
End: 2023-10-16
Payer: MEDICARE

## 2023-10-16 PROCEDURE — G0299 HHS/HOSPICE OF RN EA 15 MIN: HCPCS

## 2023-10-17 VITALS
TEMPERATURE: 97 F | OXYGEN SATURATION: 96 % | HEART RATE: 72 BPM | RESPIRATION RATE: 18 BRPM | SYSTOLIC BLOOD PRESSURE: 110 MMHG | DIASTOLIC BLOOD PRESSURE: 60 MMHG

## 2023-10-18 ENCOUNTER — HOME CARE VISIT (OUTPATIENT)
Dept: HOME HEALTH SERVICES | Facility: HOME HEALTHCARE | Age: 63
End: 2023-10-18
Payer: MEDICARE

## 2023-10-18 VITALS
OXYGEN SATURATION: 97 % | HEART RATE: 80 BPM | TEMPERATURE: 98.3 F | SYSTOLIC BLOOD PRESSURE: 120 MMHG | DIASTOLIC BLOOD PRESSURE: 66 MMHG | RESPIRATION RATE: 20 BRPM

## 2023-10-18 LAB
DME PARACHUTE DELIVERY DATE ACTUAL: NORMAL
DME PARACHUTE DELIVERY DATE EXPECTED: NORMAL
DME PARACHUTE DELIVERY DATE REQUESTED: NORMAL
DME PARACHUTE ITEM DESCRIPTION: NORMAL
DME PARACHUTE ORDER STATUS: NORMAL
DME PARACHUTE SUPPLIER NAME: NORMAL
DME PARACHUTE SUPPLIER PHONE: NORMAL

## 2023-10-18 PROCEDURE — G0299 HHS/HOSPICE OF RN EA 15 MIN: HCPCS

## 2023-10-20 ENCOUNTER — HOME CARE VISIT (OUTPATIENT)
Dept: HOME HEALTH SERVICES | Facility: HOME HEALTHCARE | Age: 63
End: 2023-10-20
Payer: MEDICARE

## 2023-10-20 VITALS
TEMPERATURE: 98 F | SYSTOLIC BLOOD PRESSURE: 118 MMHG | HEART RATE: 66 BPM | OXYGEN SATURATION: 96 % | DIASTOLIC BLOOD PRESSURE: 78 MMHG

## 2023-10-20 PROCEDURE — G0299 HHS/HOSPICE OF RN EA 15 MIN: HCPCS

## 2023-10-21 NOTE — TELEPHONE ENCOUNTER
FAXED ON 10/13/23 TO   [employer]    at [phone]. FAX CONFIRMATION RECEIVED.  SCANNED GUMARO CHART

## 2023-10-23 ENCOUNTER — HOME CARE VISIT (OUTPATIENT)
Dept: HOME HEALTH SERVICES | Facility: HOME HEALTHCARE | Age: 63
End: 2023-10-23
Payer: MEDICARE

## 2023-10-23 DIAGNOSIS — G89.29 CHRONIC LOW BACK PAIN WITHOUT SCIATICA, UNSPECIFIED BACK PAIN LATERALITY: ICD-10-CM

## 2023-10-23 DIAGNOSIS — K21.9 GASTROESOPHAGEAL REFLUX DISEASE: ICD-10-CM

## 2023-10-23 DIAGNOSIS — G82.20 PARAPLEGIC SPINAL PARALYSIS (HCC): ICD-10-CM

## 2023-10-23 DIAGNOSIS — L89.324 STAGE IV PRESSURE ULCER OF LEFT BUTTOCK (HCC): ICD-10-CM

## 2023-10-23 DIAGNOSIS — M54.50 CHRONIC LOW BACK PAIN WITHOUT SCIATICA, UNSPECIFIED BACK PAIN LATERALITY: ICD-10-CM

## 2023-10-23 PROCEDURE — G0299 HHS/HOSPICE OF RN EA 15 MIN: HCPCS

## 2023-10-23 RX ORDER — OXYCODONE HYDROCHLORIDE 15 MG/1
15 TABLET ORAL EVERY 6 HOURS PRN
Qty: 120 TABLET | Refills: 0 | Status: SHIPPED | OUTPATIENT
Start: 2023-10-23

## 2023-10-23 RX ORDER — ASPIRIN 81 MG/1
81 TABLET ORAL DAILY
Qty: 90 TABLET | Refills: 0 | Status: SHIPPED | OUTPATIENT
Start: 2023-10-23

## 2023-10-23 RX ORDER — IBUPROFEN 800 MG/1
800 TABLET ORAL EVERY 8 HOURS PRN
Qty: 60 TABLET | Refills: 0 | Status: SHIPPED | OUTPATIENT
Start: 2023-10-23

## 2023-10-23 RX ORDER — OMEPRAZOLE 40 MG/1
40 CAPSULE, DELAYED RELEASE ORAL DAILY
Qty: 30 CAPSULE | Refills: 0 | Status: SHIPPED | OUTPATIENT
Start: 2023-10-23

## 2023-10-24 VITALS
TEMPERATURE: 97.3 F | HEART RATE: 76 BPM | OXYGEN SATURATION: 97 % | SYSTOLIC BLOOD PRESSURE: 112 MMHG | RESPIRATION RATE: 18 BRPM | DIASTOLIC BLOOD PRESSURE: 62 MMHG

## 2023-10-25 ENCOUNTER — HOME CARE VISIT (OUTPATIENT)
Dept: HOME HEALTH SERVICES | Facility: HOME HEALTHCARE | Age: 63
End: 2023-10-25
Payer: MEDICARE

## 2023-10-25 PROCEDURE — G0299 HHS/HOSPICE OF RN EA 15 MIN: HCPCS

## 2023-10-27 ENCOUNTER — HOME CARE VISIT (OUTPATIENT)
Dept: HOME HEALTH SERVICES | Facility: HOME HEALTHCARE | Age: 63
End: 2023-10-27
Payer: MEDICARE

## 2023-10-27 ENCOUNTER — OFFICE VISIT (OUTPATIENT)
Dept: WOUND CARE | Facility: CLINIC | Age: 63
End: 2023-10-27
Payer: MEDICARE

## 2023-10-27 VITALS
DIASTOLIC BLOOD PRESSURE: 88 MMHG | TEMPERATURE: 96.8 F | SYSTOLIC BLOOD PRESSURE: 144 MMHG | RESPIRATION RATE: 18 BRPM | HEART RATE: 60 BPM

## 2023-10-27 DIAGNOSIS — L89.134 STAGE IV PRESSURE ULCER OF RIGHT LOWER BACK (HCC): ICD-10-CM

## 2023-10-27 DIAGNOSIS — L89.44 PRESSURE INJURY OF CONTIGUOUS REGION INVOLVING BACK AND LEFT BUTTOCK, STAGE 4 (HCC): ICD-10-CM

## 2023-10-27 DIAGNOSIS — L89.133 PRESSURE ULCER OF RIGHT LOWER BACK, STAGE 3 (HCC): Primary | ICD-10-CM

## 2023-10-27 PROCEDURE — 11042 DBRDMT SUBQ TIS 1ST 20SQCM/<: CPT | Performed by: SURGERY

## 2023-10-27 PROCEDURE — 11045 DBRDMT SUBQ TISS EACH ADDL: CPT | Performed by: SURGERY

## 2023-10-27 PROCEDURE — 97605 NEG PRS WND THER DME<=50SQCM: CPT

## 2023-10-27 NOTE — ASSESSMENT & PLAN NOTE
The wound is improved with the VAC dressing. The base was debrided. Continue the same care.   Follow-up in 2 to 3 weeks

## 2023-10-27 NOTE — ASSESSMENT & PLAN NOTE
The wound still all connect and are about the same. Unfortunate still has a green color despite the Dakin's dressings. We will switch to Acticoat to see if this can help decrease the colonization. Follow-up in 2 to 3 weeks.

## 2023-10-27 NOTE — PROGRESS NOTES
Patient ID: Ruben Albrecht is a 59 y.o. male Date of Birth 1960     Chief Complaint  Chief Complaint   Patient presents with    Follow Up Wound Care Visit     Multiple pressure injuries of bilateral buttocks, right lower back, and perineum. Allergies  Patient has no known allergies. 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 Unfortunate still has a green color despite the Dakin's dressings. We will switch to Acticoat to see if this can help decrease the colonization. Follow-up in 2 to 3 weeks. Stage IV pressure ulcer of right lower back ([address])  The wound is improved with the VAC dressing. The base was debrided. Continue the same care. 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 Consent given by: patient  Time out: Immediately prior to procedure a "time out" was called to verify the correct patient, procedure, equipment, support staff and site/side marked as required.     Performed by: physician  Debridement type: surgical  Level of debridement: subcutaneous tissue  Pain control: lidocaine 4%  Post-debridement measurements  Length (cm): 4.7  Width (cm): 8.8  Depth (cm): 3.3  Percent debrided: 50%  Surface Area (cm^2): 41.4  Area debrided (cm^2): 20.7  Volume (cm^3): 136.5  Tissue and other material debrided: subcutaneous tissue  Devitalized tissue debrided: biofilm, fibrin and slough  Instrument(s) utilized: curette  Procedural pain (0-10): 1  Post-procedural pain: 1   Response to treatment: procedure was tolerated well        Plan:     Wound 08/26/20 Pressure Injury Buttocks Left (Active)   Wound Image Images linked 10/27/23 1030   Wound Description Pale;Pink;Yellow (rolled edges) 10/27/23 1032   Pressure Injury Stage 4 10/27/23 1032   Shelby-wound Assessment Maceration;Scar Tissue 10/27/23 1032   Wound Length (cm) 2.4 cm 10/27/23 1032   Wound Width (cm) 2.2 cm 10/27/23 1032   Wound Depth (cm) 1.2 cm 10/27/23 1032   Wound Surface Area (cm^2) 5.28 cm^2 10/27/23 1032   Wound Volume (cm^3) 6.336 cm^3 10/27/23 1032   Calculated Wound Volume (cm^3) 6.34 cm^3 10/27/23 1032   Change in Wound Size % 77.99 10/27/23 1032   Tunneling 3.9 cm 10/27/23 1032   Tunneling in depth located at communicates with perineal wound @ 3 oclock 10/27/23 1032   Undermining 5 10/27/23 1032   Undermining is depth extending from 10-11 oclock 10/27/23 1032   Drainage Amount Moderate 10/27/23 1032   Drainage Description Green;Tan;Milky 10/27/23 1032   Non-staged Wound Description Full thickness 10/27/23 1032   Dressing Status Intact 10/27/23 1032       Wound 07/29/21 Pressure Injury Back Right; Lower; Lateral (Active)   Wound Image Images linked 10/27/23 1111   Wound Description Granulation tissue; Yellow;Pink;Slough 10/27/23 1056   Pressure Injury Stage 3 10/27/23 1056   Shelby-wound Assessment Maceration;Pink;Scar Tissue 10/27/23 1056   Wound Length (cm) 4.7 cm 10/27/23 1056   Wound Width (cm) 8.8 cm 10/27/23 1056   Wound Depth (cm) 3.3 cm 10/27/23 1056   Wound Surface Area (cm^2) 41.36 cm^2 10/27/23 1056   Wound Volume (cm^3) 136.488 cm^3 10/27/23 1056   Calculated Wound Volume (cm^3) 136.49 cm^3 10/27/23 1056   Change in Wound Size % -686.23 10/27/23 1056   Undermining 6 (@ 11:00) 10/27/23 1056   Undermining is depth extending from 12:0-12:00 deepest at 11:00 10/27/23 1056   Drainage Amount Large 10/27/23 1056   Drainage Description [place];Serosanguineous 10/27/23 1056   Packing- # removed 1 (black foam.  No label on dressing.) 10/27/23 1056       Wound 11/05/21 Pressure Injury Perineum (Active)   Wound Image Images linked 10/27/23 1031   Wound Description Yellow;Pink (rolled edges) 10/27/23 1034   Pressure Injury Stage 3 10/27/23 1034   Shelby-wound Assessment Intact;Scar Tissue 10/27/23 1034   Wound Length (cm) 1.4 cm 10/27/23 1034   Wound Width (cm) 1 cm 10/27/23 1034   Wound Depth (cm) 2.3 cm 10/27/23 1034   Wound Surface Area (cm^2) 1.4 cm^2 10/27/23 1034   Wound Volume (cm^3) 3.22 cm^3 10/27/23 1034   Calculated Wound Volume (cm^3) 3.22 cm^3 10/27/23 1034   Change in Wound Size % -3120 10/27/23 1034   Tunneling 4.9 cm 10/27/23 1034   Tunneling in depth located at communicates with left buttocks at 9 Oclock 10/27/23 1034   Undermining 5.4 10/27/23 1034   Undermining is depth extending from 12-5 oclock 10/27/23 1034   Drainage Amount Moderate 10/27/23 1034   Drainage Description Green;Tan;Milky 10/27/23 1034   Non-staged Wound Description Full thickness 10/27/23 1034   Dressing Status Intact 10/27/23 1034       Wound 10/13/23 Pressure Injury Buttocks Right (Active)   Wound Image Images linked 10/27/23 1031   Wound Description Pink;Yellow 10/27/23 1036   Pressure Injury Stage 3 10/27/23 1036   Shelby-wound Assessment Scar Tissue 10/27/23 1036   Wound Length (cm) 0.9 cm 10/27/23 1036   Wound Width (cm) 0.9 cm 10/27/23 1036   Wound Depth (cm) 0.1 cm 10/27/23 1036   Wound Surface Area (cm^2) 0.81 cm^2 10/27/23 1036   Wound Volume (cm^3) 0.081 cm^3 10/27/23 1036   Calculated Wound Volume (cm^3) 0.08 cm^3 10/27/23 1036   Change in Wound Size % 42.86 10/27/23 1036   Drainage Amount Small 10/27/23 1036   Drainage Description Serous; Yellow 10/27/23 1036   Non-staged Wound Description Full thickness 10/27/23 1036   Dressing Status Intact 10/27/23 1036       Wound 08/26/20 Pressure Injury Buttocks Left (Active)   Date First Assessed/Time First Assessed: 08/26/20 1056   Primary Wound Type: Pressure Injury  Location: Buttocks  Wound Location Orientation: Left  Wound Outcome: Palliative       Wound 07/29/21 Pressure Injury Back Right; Lower; Lateral (Active)   Date First Assessed: 07/29/21   Primary Wound Type: Pressure Injury  Location: Back  Wound Location Orientation: Right; Lower; Lateral  Wound Outcome: Palliative       Wound 11/05/21 Pressure Injury Perineum (Active)   Date First Assessed/Time First Assessed: 11/05/21 1059   Primary Wound Type: Pressure Injury  Location: Perineum  Wound Outcome: Palliative       Wound 10/13/23 Pressure Injury Buttocks Right (Active)   Date First Assessed/Time First Assessed: 10/13/23 1035   Primary Wound Type: Pressure Injury  Location: Buttocks  Wound Location Orientation: Right       [REMOVED] Wound 08/26/19 Buttocks Left;Distal;Lateral (Removed)   Resolved Date/Resolved Time: 08/26/20 1056  Date First Assessed/Time First Assessed: 08/26/19 1130   Pre-Existing Wound: Yes  Location: Buttocks  Wound Location Orientation: Left;Distal;Lateral  Wound Description (Comments): Deep ischial wound       [REMOVED] Wound 09/16/19 Buttocks Right;Distal (Removed)   Resolved Date/Resolved Time: 08/26/20 1055  Date First Assessed/Time First Assessed: 09/16/19 1657   Pre-Existing Wound: Yes  Location: Buttocks  Wound Location Orientation: Right;Distal       [REMOVED] Wound 10/28/19 Knee Left (Removed)   Resolved Date/Resolved Time: 08/26/20 1055  Date First Assessed/Time First Assessed: 10/28/19 0657   Pre-Existing Wound: Yes  Location: Knee  Wound Location Orientation: Left  Wound Description (Comments): round black  Dressing Status: Open to air       [REMOVED] Wound 10/28/19 Buttocks Left;Mid (Removed)   Resolved Date/Resolved Time: 08/26/20 1056  Date First Assessed/Time First Assessed: 10/28/19 1108   Pre-Existing Wound: Yes  Location: Buttocks  Wound Location Orientation: Left;Mid  Wound Description (Comments): Stage 2 vs traumatic injury       [REMOVED] Wound 11/01/19 Other (Comment) N/A (Removed)   Resolved Date/Resolved Time: 08/26/20 1055  Date First Assessed/Time First Assessed: 11/01/19 1640   Location: Other (Comment)  Wound Location Orientation: N/A  Wound Description (Comments): scrotum dressed with exofin       [REMOVED] Wound 08/26/20 Pressure Injury Hip Left (Removed)   Resolved Date: 10/04/23  Date First Assessed/Time First Assessed: 08/26/20 1057   Primary Wound Type: Pressure Injury  Location: Hip  Wound Location Orientation: Left  Wound Outcome: Palliative       [REMOVED] Wound 05/27/22 Skin Tear Buttocks Left;Proximal (Removed)   Resolved Date/Resolved Time: 08/05/22 0851  Date First Assessed/Time First Assessed: 05/27/22 0946   Primary Wound Type: Skin Tear  Location: Buttocks  Wound Location Orientation: Left;Proximal  Wound Outcome: Healed       [REMOVED] Wound 03/17/23 Pressure Injury Hip Lateral;Left;Proximal (Removed)   Resolved Date: 08/21/23  Date First Assessed/Time First Assessed: 03/17/23 1039   Primary Wound Type: Pressure Injury  Location: Hip  Wound Location Orientation: Lateral;Left;Proximal  Wound Outcome: Healed       [REMOVED] Wound 06/19/23 Pressure Injury Hip Left;Medial (Removed)   Resolved Date: 09/18/23  Date First Assessed: 06/19/23   Present on Original Admission: No  Primary Wound Type: Pressure Injury  Location: Hip  Wound Location Orientation: Left;Medial  Wound Description (Comments): granulation  yellow serous drainage . .. Subjective:      . Mr. Ela Gaston is a 31-year-old gentleman with paraplegia and history of multiple pressure wounds with 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. He had been rescheduled for a debridement and flap closure by plastics in August but developed a new wound on his right mid to lower back chest wall. This wound probes deep and has been closed on the outside but the tract has not been improving.   He had a CT scan that shows a deep tracking to the muscle but no fistulization to the internal thoracic cavity. 02/04/2022 he returns now for re-evaluation. He still has had visiting nurses but has not been seen in the [address] since November. He denies any change or complaint    03/11/2022 no changes since been seen last.  No new complaints. 04/22/2022 no issues. No changes. 05/27/2022. Doing well. Denies fevers or chills. No issues spoke about plastics a re-evaluation but he wants to wait for COVID to wane more and maybe next fall    07/22/2022. He has not been seen here since May mostly due to transportation issues. He has significant more pain and drainage on his right back chest wall wound. Otherwise no changes    08/05/2022 denies fevers or chills. Still in pain on the right side. 3/17/2023 he returns for evaluation. He was seen in the wound center by another provider month or 2 ago. 6/9/2023. Here for long-term follow-up. No significant changes. 7/14/2023. No changes. Doing well.    9/22/2023 he had an outpatient CT scan. The right chest wall wound has increasing drainage. The dressings from the sacral area have some greenish color    10/13/2023. He did receive a VAC. He still has greenish drainage on the dressings    10/27/2023 no significant changes. He states he did get his hospital mattress. Tolerating the VAC well.         The following portions of the patient's history were reviewed and updated as appropriate: allergies, current medications, past family history, past medical history, past social history, past surgical history and problem list.    Review of Systems      Objective:       Wound 08/26/20 Pressure Injury Buttocks Left (Active)   Wound Image Images linked 10/27/23 1030   Wound Description Pale;Pink;Yellow (rolled edges) 10/27/23 1032   Pressure Injury Stage 4 10/27/23 1032   Shelby-wound Assessment Maceration;Scar Tissue 10/27/23 1032   Wound Length (cm) 2.4 cm 10/27/23 1032   Wound Width (cm) 2.2 cm 10/27/23 1032   Wound Depth (cm) 1.2 cm 10/27/23 1032   Wound Surface Area (cm^2) 5.28 cm^2 10/27/23 1032   Wound Volume (cm^3) 6.336 cm^3 10/27/23 1032   Calculated Wound Volume (cm^3) 6.34 cm^3 10/27/23 1032   Change in Wound Size % 77.99 10/27/23 1032   Tunneling 3.9 cm 10/27/23 1032   Tunneling in depth located at communicates with perineal wound @ 3 oclock 10/27/23 1032   Undermining 5 10/27/23 1032   Undermining is depth extending from 10-11 oclock 10/27/23 1032   Drainage Amount Moderate 10/27/23 1032   Drainage Description Green;Tan;Milky 10/27/23 1032   Non-staged Wound Description Full thickness 10/27/23 1032   Dressing Status Intact 10/27/23 1032       Wound 07/29/21 Pressure Injury Back Right; Lower; Lateral (Active)   Wound Image Images linked 10/27/23 1111   Wound Description Granulation tissue; Yellow;Pink;Slough 10/27/23 1056   Pressure Injury Stage 3 10/27/23 1056   Shelby-wound Assessment Maceration;Pink;Scar Tissue 10/27/23 1056   Wound Length (cm) 4.7 cm 10/27/23 1056   Wound Width (cm) 8.8 cm 10/27/23 1056   Wound Depth (cm) 3.3 cm 10/27/23 1056   Wound Surface Area (cm^2) 41.36 cm^2 10/27/23 1056   Wound Volume (cm^3) 136.488 cm^3 10/27/23 1056   Calculated Wound Volume (cm^3) 136.49 cm^3 10/27/23 1056   Change in Wound Size % -686.23 10/27/23 1056   Undermining 6 (@ 11:00) 10/27/23 1056   Undermining is depth extending from 12:0-12:00 deepest at 11:00 10/27/23 1056   Drainage Amount Large 10/27/23 1056   Drainage Description Tan;Serosanguineous 10/27/23 1056   Packing- # removed 1 (black foam.  No label on dressing.) 10/27/23 1056       Wound 11/05/21 Pressure Injury Perineum (Active)   Wound Image Images linked 10/27/23 1031   Wound Description Yellow;Pink (rolled edges) 10/27/23 1034   Pressure Injury Stage 3 10/27/23 1034   Shelby-wound Assessment Intact;Scar Tissue 10/27/23 1034   Wound Length (cm) 1.4 cm 10/27/23 1034   Wound Width (cm) 1 cm 10/27/23 1034   Wound Depth (cm) 2.3 cm 10/27/23 1034   Wound Surface Area (cm^2) 1.4 cm^2 10/27/23 1034   Wound Volume (cm^3) 3.22 cm^3 10/27/23 1034   Calculated Wound Volume (cm^3) 3.22 cm^3 10/27/23 1034   Change in Wound Size % -3120 10/27/23 1034   Tunneling 4.9 cm 10/27/23 1034   Tunneling in depth located at communicates with left buttocks at 9 Oclock 10/27/23 1034   Undermining 5.4 10/27/23 1034   Undermining is depth extending from 12-5 oclock 10/27/23 1034   Drainage Amount Moderate 10/27/23 1034   Drainage Description Green;Tan;Milky 10/27/23 1034   Non-staged Wound Description Full thickness 10/27/23 1034   Dressing Status Intact 10/27/23 1034       Wound 10/13/23 Pressure Injury Buttocks Right (Active)   Wound Image Images linked 10/27/23 1031   Wound Description Pink;Yellow 10/27/23 1036   Pressure Injury Stage 3 10/27/23 1036   Shelby-wound Assessment Scar Tissue 10/27/23 1036   Wound Length (cm) 0.9 cm 10/27/23 1036   Wound Width (cm) 0.9 cm 10/27/23 1036   Wound Depth (cm) 0.1 cm 10/27/23 1036   Wound Surface Area (cm^2) 0.81 cm^2 10/27/23 1036   Wound Volume (cm^3) 0.081 cm^3 10/27/23 1036   Calculated Wound Volume (cm^3) 0.08 cm^3 10/27/23 1036   Change in Wound Size % 42.86 10/27/23 1036   Drainage Amount Small 10/27/23 1036   Drainage Description Serous; Yellow 10/27/23 1036   Non-staged Wound Description Full thickness 10/27/23 1036   Dressing Status Intact 10/27/23 1036       /88   Pulse 60   Temp (!) 96.8 °F (36 °C)   Resp 18     Physical Exam      Wound Instructions:  Orders Placed This Encounter   Procedures    Wound cleansing and dressings     Shower, No. Do not get dressings wet. R lateral back:   Apply skin prep to all skin around wound and under drape. Apply alginate AG to the yellow/pink area around the wound. Cover with drape. Place black foam into wound bed, and bridge to abdomen. Cover with drape. Wound Vac to run at 125mmhg continuous. Change wound vac three times a week.       Perineum and Left and Right Buttock Wounds:   Cleanse wounds with normal saline. Pat dry   Skin prep to gaby-wound  Apply sheet of Acticoat 3 followed by Calcium alginate. Ensure to tuck into communicating areas from left buttocks to perineum and all undermining areas   Cover with ABD's. Change dressings 3 times a week and as needed for excessive drainage        If left hip wounds reopen please dress as follows:  Cleanse/Irrigate wound with normal saline. Cover with silicon bordered foam.   Change three times a week. You need to keep pressure off of right back wound. Continue VNA three daily for dressing changes, except on the day the patient goes to the wound center. OFF-LOADING   Avoid pressure at wound site. - all wounds, including right back   Wheelchair Cushion. - ROHO cushion   Do Not Sit for Long Periods of Time. - 1 hr max for meals only, otherwise in bed and turn side to side at least   every 3 hours or more frequently   Reposition at least every 1-2 hours while awake. Today's Wound treatment note: Cleansed with NSS and dressed as above. Standing Status:   Future     Standing Expiration Date:   10/27/2024    Debridement     This order was created via procedure documentation        Diagnosis ICD-10-CM Associated Orders   1. Pressure ulcer of right lower back, stage 3 ([facility])  L89.133 Wound cleansing and dressings      2. 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  Stage IV pressure ulcer of right lower back ([address])  L89.134

## 2023-10-27 NOTE — PATIENT INSTRUCTIONS
Orders Placed This Encounter   Procedures    Wound cleansing and dressings     Shower, No. Do not get dressings wet. R lateral back:   Apply skin prep to all skin around wound and under drape. Apply alginate AG to the yellow/pink area around the wound. Cover with drape. Place black foam into wound bed, and bridge to abdomen. Cover with drape. Wound Vac to run at 125mmhg continuous. Change wound vac three times a week. Perineum and Left and Right Buttock Wounds:   Cleanse wounds with normal saline. Pat dry   Skin prep to gaby-wound  Apply sheet of Acticoat 3 followed by Calcium alginate. Ensure to tuck into communicating areas from left buttocks to perineum and all undermining areas   Cover with ABD's. Change dressings 3 times a week and as needed for excessive drainage        If left hip wounds reopen please dress as follows:  Cleanse/Irrigate wound with normal saline. Cover with silicon bordered foam.   Change three times a week. You need to keep pressure off of right back wound. Continue VNA three daily for dressing changes, except on the day the patient goes to the wound center. OFF-LOADING   Avoid pressure at wound site. - all wounds, including right back   Wheelchair Cushion. - ROHO cushion   Do Not Sit for Long Periods of Time. - 1 hr max for meals only, otherwise in bed and turn side to side at least   every 3 hours or more frequently   Reposition at least every 1-2 hours while awake. Today's Wound treatment note: Cleansed with NSS and dressed as above.      Standing Status:   Future     Standing Expiration Date:   10/27/2024

## 2023-10-27 NOTE — PROGRESS NOTES
Negative Pressure Wound Therapy    Performed by: Slime Baldwin RN  Authorized by: Mora Tomas MD  Universal Protocol:  Consent: Verbal consent obtained. Consent given by: patient  Time out: Immediately prior to procedure a "time out" was called to verify the correct patient, procedure, equipment, support staff and site/side marked as required.   Patient understanding: patient states understanding of the procedure being performed  Patient identity confirmed: verbally with patient    Performed by[de-identified]  Physician  Type[de-identified]  [facility]  Coverage Size (sq cm)::  41.4  Pressure Type[de-identified]  Constant  Pressure Setting[de-identified]  125 mmHG  Sponge Type[de-identified]  Black  Sponge Size:  Medium  Total Number Of Sponges Removed Prior To Application[de-identified]  1  Total Number Of Sponges Used For This Application[de-identified]  1

## 2023-10-30 ENCOUNTER — HOME CARE VISIT (OUTPATIENT)
Dept: HOME HEALTH SERVICES | Facility: HOME HEALTHCARE | Age: 63
End: 2023-10-30
Payer: MEDICARE

## 2023-10-30 PROCEDURE — G0299 HHS/HOSPICE OF RN EA 15 MIN: HCPCS

## 2023-10-31 ENCOUNTER — HOME CARE VISIT (OUTPATIENT)
Dept: HOME HEALTH SERVICES | Facility: HOME HEALTHCARE | Age: 63
End: 2023-10-31
Payer: MEDICARE

## 2023-10-31 VITALS
OXYGEN SATURATION: 98 % | SYSTOLIC BLOOD PRESSURE: 120 MMHG | HEART RATE: 78 BPM | RESPIRATION RATE: 18 BRPM | DIASTOLIC BLOOD PRESSURE: 74 MMHG

## 2023-11-01 ENCOUNTER — HOME CARE VISIT (OUTPATIENT)
Dept: HOME HEALTH SERVICES | Facility: HOME HEALTHCARE | Age: 63
End: 2023-11-01
Payer: MEDICARE

## 2023-11-01 PROCEDURE — G0299 HHS/HOSPICE OF RN EA 15 MIN: HCPCS

## 2023-11-03 ENCOUNTER — HOME CARE VISIT (OUTPATIENT)
Dept: HOME HEALTH SERVICES | Facility: HOME HEALTHCARE | Age: 63
End: 2023-11-03
Payer: MEDICARE

## 2023-11-03 VITALS
OXYGEN SATURATION: 97 % | TEMPERATURE: 97.7 F | RESPIRATION RATE: 20 BRPM | SYSTOLIC BLOOD PRESSURE: 120 MMHG | HEART RATE: 84 BPM | DIASTOLIC BLOOD PRESSURE: 68 MMHG

## 2023-11-03 PROCEDURE — G0299 HHS/HOSPICE OF RN EA 15 MIN: HCPCS

## 2023-11-05 VITALS
OXYGEN SATURATION: 97 % | SYSTOLIC BLOOD PRESSURE: 110 MMHG | HEART RATE: 82 BPM | DIASTOLIC BLOOD PRESSURE: 60 MMHG | TEMPERATURE: 97.6 F | RESPIRATION RATE: 18 BRPM

## 2023-11-06 ENCOUNTER — HOME CARE VISIT (OUTPATIENT)
Dept: HOME HEALTH SERVICES | Facility: HOME HEALTHCARE | Age: 63
End: 2023-11-06
Payer: MEDICARE

## 2023-11-06 PROCEDURE — G0299 HHS/HOSPICE OF RN EA 15 MIN: HCPCS

## 2023-11-07 VITALS
RESPIRATION RATE: 18 BRPM | HEART RATE: 76 BPM | TEMPERATURE: 96.9 F | SYSTOLIC BLOOD PRESSURE: 116 MMHG | OXYGEN SATURATION: 97 % | DIASTOLIC BLOOD PRESSURE: 70 MMHG

## 2023-11-08 ENCOUNTER — HOME CARE VISIT (OUTPATIENT)
Dept: HOME HEALTH SERVICES | Facility: HOME HEALTHCARE | Age: 63
End: 2023-11-08
Payer: MEDICARE

## 2023-11-08 PROCEDURE — G0299 HHS/HOSPICE OF RN EA 15 MIN: HCPCS

## 2023-11-09 VITALS
RESPIRATION RATE: 18 BRPM | TEMPERATURE: 97.4 F | HEART RATE: 78 BPM | OXYGEN SATURATION: 96 % | SYSTOLIC BLOOD PRESSURE: 110 MMHG | DIASTOLIC BLOOD PRESSURE: 60 MMHG

## 2023-11-10 ENCOUNTER — HOME CARE VISIT (OUTPATIENT)
Dept: HOME HEALTH SERVICES | Facility: HOME HEALTHCARE | Age: 63
End: 2023-11-10
Payer: MEDICARE

## 2023-11-10 PROCEDURE — G0299 HHS/HOSPICE OF RN EA 15 MIN: HCPCS

## 2023-11-12 VITALS
RESPIRATION RATE: 18 BRPM | HEART RATE: 78 BPM | OXYGEN SATURATION: 97 % | TEMPERATURE: 97.8 F | DIASTOLIC BLOOD PRESSURE: 70 MMHG | SYSTOLIC BLOOD PRESSURE: 118 MMHG

## 2023-11-13 ENCOUNTER — HOME CARE VISIT (OUTPATIENT)
Dept: HOME HEALTH SERVICES | Facility: HOME HEALTHCARE | Age: 63
End: 2023-11-13
Payer: MEDICARE

## 2023-11-13 PROCEDURE — G0299 HHS/HOSPICE OF RN EA 15 MIN: HCPCS

## 2023-11-14 VITALS
OXYGEN SATURATION: 97 % | RESPIRATION RATE: 18 BRPM | HEART RATE: 82 BPM | SYSTOLIC BLOOD PRESSURE: 112 MMHG | DIASTOLIC BLOOD PRESSURE: 64 MMHG | TEMPERATURE: 97.3 F

## 2023-11-15 ENCOUNTER — HOME CARE VISIT (OUTPATIENT)
Dept: HOME HEALTH SERVICES | Facility: HOME HEALTHCARE | Age: 63
End: 2023-11-15
Payer: MEDICARE

## 2023-11-15 PROCEDURE — G0299 HHS/HOSPICE OF RN EA 15 MIN: HCPCS

## 2023-11-17 ENCOUNTER — HOME CARE VISIT (OUTPATIENT)
Dept: HOME HEALTH SERVICES | Facility: HOME HEALTHCARE | Age: 63
End: 2023-11-17
Payer: MEDICARE

## 2023-11-17 VITALS
OXYGEN SATURATION: 96 % | HEART RATE: 74 BPM | SYSTOLIC BLOOD PRESSURE: 110 MMHG | TEMPERATURE: 97.3 F | DIASTOLIC BLOOD PRESSURE: 70 MMHG | RESPIRATION RATE: 18 BRPM

## 2023-11-17 PROCEDURE — G0299 HHS/HOSPICE OF RN EA 15 MIN: HCPCS

## 2023-11-19 VITALS
SYSTOLIC BLOOD PRESSURE: 116 MMHG | OXYGEN SATURATION: 97 % | TEMPERATURE: 96.7 F | DIASTOLIC BLOOD PRESSURE: 60 MMHG | HEART RATE: 74 BPM | RESPIRATION RATE: 18 BRPM

## 2023-11-20 ENCOUNTER — HOME CARE VISIT (OUTPATIENT)
Dept: HOME HEALTH SERVICES | Facility: HOME HEALTHCARE | Age: 63
End: 2023-11-20
Payer: MEDICARE

## 2023-11-20 PROCEDURE — G0299 HHS/HOSPICE OF RN EA 15 MIN: HCPCS

## 2023-11-21 VITALS
DIASTOLIC BLOOD PRESSURE: 70 MMHG | HEART RATE: 88 BPM | TEMPERATURE: 97 F | SYSTOLIC BLOOD PRESSURE: 110 MMHG | OXYGEN SATURATION: 97 % | RESPIRATION RATE: 18 BRPM

## 2023-11-21 DIAGNOSIS — G89.29 CHRONIC LOW BACK PAIN WITHOUT SCIATICA, UNSPECIFIED BACK PAIN LATERALITY: ICD-10-CM

## 2023-11-21 DIAGNOSIS — M54.50 CHRONIC LOW BACK PAIN WITHOUT SCIATICA, UNSPECIFIED BACK PAIN LATERALITY: ICD-10-CM

## 2023-11-21 DIAGNOSIS — G82.20 PARAPLEGIC SPINAL PARALYSIS (HCC): ICD-10-CM

## 2023-11-21 DIAGNOSIS — L89.324 STAGE IV PRESSURE ULCER OF LEFT BUTTOCK (HCC): ICD-10-CM

## 2023-11-22 ENCOUNTER — HOME CARE VISIT (OUTPATIENT)
Dept: HOME HEALTH SERVICES | Facility: HOME HEALTHCARE | Age: 63
End: 2023-11-22
Payer: MEDICARE

## 2023-11-22 ENCOUNTER — TELEPHONE (OUTPATIENT)
Dept: FAMILY MEDICINE CLINIC | Facility: CLINIC | Age: 63
End: 2023-11-22

## 2023-11-22 DIAGNOSIS — G89.29 CHRONIC LOW BACK PAIN WITHOUT SCIATICA, UNSPECIFIED BACK PAIN LATERALITY: ICD-10-CM

## 2023-11-22 DIAGNOSIS — L89.324 STAGE IV PRESSURE ULCER OF LEFT BUTTOCK (HCC): ICD-10-CM

## 2023-11-22 DIAGNOSIS — M54.50 CHRONIC LOW BACK PAIN WITHOUT SCIATICA, UNSPECIFIED BACK PAIN LATERALITY: ICD-10-CM

## 2023-11-22 DIAGNOSIS — G82.20 PARAPLEGIC SPINAL PARALYSIS (HCC): ICD-10-CM

## 2023-11-22 PROCEDURE — G0299 HHS/HOSPICE OF RN EA 15 MIN: HCPCS

## 2023-11-22 RX ORDER — OXYCODONE HYDROCHLORIDE 15 MG/1
15 TABLET ORAL EVERY 6 HOURS PRN
Qty: 120 TABLET | Refills: 0 | Status: SHIPPED | OUTPATIENT
Start: 2023-11-22

## 2023-11-22 RX ORDER — IBUPROFEN 800 MG/1
800 TABLET ORAL EVERY 8 HOURS PRN
Qty: 60 TABLET | Refills: 0 | Status: SHIPPED | OUTPATIENT
Start: 2023-11-22

## 2023-11-22 RX ORDER — ASPIRIN 81 MG/1
81 TABLET ORAL DAILY
Qty: 90 TABLET | Refills: 0 | Status: SHIPPED | OUTPATIENT
Start: 2023-11-22

## 2023-11-22 RX ORDER — OXYCODONE HYDROCHLORIDE 15 MG/1
15 TABLET ORAL EVERY 6 HOURS PRN
Qty: 120 TABLET | Refills: 0 | Status: SHIPPED | OUTPATIENT
Start: 2023-11-22 | End: 2023-11-22 | Stop reason: SDUPTHER

## 2023-11-22 NOTE — TELEPHONE ENCOUNTER
PT CALL OFFICE TO ASK IF YOU CAN SENT OXY TO [facility] [address], [facility] OXY FOR THE NEXT 2 WEEKS.

## 2023-11-23 VITALS
RESPIRATION RATE: 18 BRPM | DIASTOLIC BLOOD PRESSURE: 62 MMHG | TEMPERATURE: 98 F | OXYGEN SATURATION: 97 % | SYSTOLIC BLOOD PRESSURE: 112 MMHG | HEART RATE: 76 BPM

## 2023-11-24 ENCOUNTER — HOME CARE VISIT (OUTPATIENT)
Dept: HOME HEALTH SERVICES | Facility: HOME HEALTHCARE | Age: 63
End: 2023-11-24
Payer: MEDICARE

## 2023-11-24 PROCEDURE — G0299 HHS/HOSPICE OF RN EA 15 MIN: HCPCS

## 2023-11-26 VITALS
OXYGEN SATURATION: 97 % | RESPIRATION RATE: 18 BRPM | TEMPERATURE: 97.4 F | HEART RATE: 86 BPM | DIASTOLIC BLOOD PRESSURE: 68 MMHG | SYSTOLIC BLOOD PRESSURE: 118 MMHG

## 2023-11-27 ENCOUNTER — HOME CARE VISIT (OUTPATIENT)
Dept: HOME HEALTH SERVICES | Facility: HOME HEALTHCARE | Age: 63
End: 2023-11-27
Payer: MEDICARE

## 2023-11-27 PROCEDURE — G0299 HHS/HOSPICE OF RN EA 15 MIN: HCPCS

## 2023-11-28 VITALS
OXYGEN SATURATION: 97 % | RESPIRATION RATE: 18 BRPM | TEMPERATURE: 97.2 F | SYSTOLIC BLOOD PRESSURE: 110 MMHG | HEART RATE: 86 BPM | DIASTOLIC BLOOD PRESSURE: 68 MMHG

## 2023-11-29 ENCOUNTER — HOME CARE VISIT (OUTPATIENT)
Dept: HOME HEALTH SERVICES | Facility: HOME HEALTHCARE | Age: 63
End: 2023-11-29
Payer: MEDICARE

## 2023-11-29 PROCEDURE — G0299 HHS/HOSPICE OF RN EA 15 MIN: HCPCS

## 2023-11-30 ENCOUNTER — HOME CARE VISIT (OUTPATIENT)
Dept: HOME HEALTH SERVICES | Facility: HOME HEALTHCARE | Age: 63
End: 2023-11-30
Payer: MEDICARE

## 2023-11-30 PROCEDURE — 10330064

## 2023-12-01 ENCOUNTER — HOME CARE VISIT (OUTPATIENT)
Dept: HOME HEALTH SERVICES | Facility: HOME HEALTHCARE | Age: 63
End: 2023-12-01
Payer: MEDICARE

## 2023-12-01 VITALS
HEART RATE: 70 BPM | SYSTOLIC BLOOD PRESSURE: 110 MMHG | TEMPERATURE: 97.3 F | OXYGEN SATURATION: 97 % | RESPIRATION RATE: 18 BRPM | DIASTOLIC BLOOD PRESSURE: 62 MMHG

## 2023-12-01 PROCEDURE — G0299 HHS/HOSPICE OF RN EA 15 MIN: HCPCS

## 2023-12-03 VITALS
RESPIRATION RATE: 18 BRPM | TEMPERATURE: 97.6 F | HEART RATE: 82 BPM | DIASTOLIC BLOOD PRESSURE: 60 MMHG | SYSTOLIC BLOOD PRESSURE: 110 MMHG | OXYGEN SATURATION: 97 %

## 2023-12-04 ENCOUNTER — HOME CARE VISIT (OUTPATIENT)
Dept: HOME HEALTH SERVICES | Facility: HOME HEALTHCARE | Age: 63
End: 2023-12-04
Payer: MEDICARE

## 2023-12-04 PROCEDURE — G0299 HHS/HOSPICE OF RN EA 15 MIN: HCPCS

## 2023-12-05 ENCOUNTER — HOME CARE VISIT (OUTPATIENT)
Dept: HOME HEALTH SERVICES | Facility: HOME HEALTHCARE | Age: 63
End: 2023-12-05
Payer: MEDICARE

## 2023-12-05 VITALS
DIASTOLIC BLOOD PRESSURE: 68 MMHG | RESPIRATION RATE: 18 BRPM | HEART RATE: 78 BPM | SYSTOLIC BLOOD PRESSURE: 110 MMHG | TEMPERATURE: 97.3 F | OXYGEN SATURATION: 97 %

## 2023-12-06 ENCOUNTER — HOME CARE VISIT (OUTPATIENT)
Dept: HOME HEALTH SERVICES | Facility: HOME HEALTHCARE | Age: 63
End: 2023-12-06
Payer: MEDICARE

## 2023-12-06 PROCEDURE — G0299 HHS/HOSPICE OF RN EA 15 MIN: HCPCS

## 2023-12-08 ENCOUNTER — HOME CARE VISIT (OUTPATIENT)
Dept: HOME HEALTH SERVICES | Facility: HOME HEALTHCARE | Age: 63
End: 2023-12-08
Payer: MEDICARE

## 2023-12-08 ENCOUNTER — OFFICE VISIT (OUTPATIENT)
Dept: WOUND CARE | Facility: CLINIC | Age: 63
End: 2023-12-08
Payer: MEDICARE

## 2023-12-08 VITALS
HEART RATE: 76 BPM | SYSTOLIC BLOOD PRESSURE: 112 MMHG | DIASTOLIC BLOOD PRESSURE: 60 MMHG | OXYGEN SATURATION: 97 % | RESPIRATION RATE: 18 BRPM | TEMPERATURE: 97.3 F

## 2023-12-08 VITALS
DIASTOLIC BLOOD PRESSURE: 66 MMHG | RESPIRATION RATE: 18 BRPM | TEMPERATURE: 98.9 F | HEART RATE: 92 BPM | SYSTOLIC BLOOD PRESSURE: 118 MMHG

## 2023-12-08 DIAGNOSIS — L89.133 PRESSURE ULCER OF RIGHT LOWER BACK, STAGE 3 (HCC): ICD-10-CM

## 2023-12-08 DIAGNOSIS — L89.223 STAGE III PRESSURE ULCER OF LEFT HIP (HCC): ICD-10-CM

## 2023-12-08 DIAGNOSIS — L89.134 STAGE IV PRESSURE ULCER OF RIGHT LOWER BACK (HCC): ICD-10-CM

## 2023-12-08 DIAGNOSIS — L89.44 PRESSURE INJURY OF CONTIGUOUS REGION INVOLVING BACK AND LEFT BUTTOCK, STAGE 4 (HCC): ICD-10-CM

## 2023-12-08 DIAGNOSIS — L89.154 STAGE IV PRESSURE ULCER OF SACRAL REGION (HCC): Primary | ICD-10-CM

## 2023-12-08 PROCEDURE — 87070 CULTURE OTHR SPECIMN AEROBIC: CPT | Performed by: SURGERY

## 2023-12-08 PROCEDURE — 87147 CULTURE TYPE IMMUNOLOGIC: CPT | Performed by: SURGERY

## 2023-12-08 PROCEDURE — 87205 SMEAR GRAM STAIN: CPT | Performed by: SURGERY

## 2023-12-08 PROCEDURE — 87186 SC STD MICRODIL/AGAR DIL: CPT | Performed by: SURGERY

## 2023-12-08 PROCEDURE — 99214 OFFICE O/P EST MOD 30 MIN: CPT | Performed by: SURGERY

## 2023-12-08 PROCEDURE — 99213 OFFICE O/P EST LOW 20 MIN: CPT | Performed by: SURGERY

## 2023-12-08 RX ORDER — SODIUM HYPOCHLORITE 1.25 MG/ML
1 SOLUTION TOPICAL DAILY
Qty: 473 ML | Refills: 1 | Status: SHIPPED | OUTPATIENT
Start: 2023-12-08

## 2023-12-08 RX ORDER — SODIUM HYPOCHLORITE 2.5 MG/ML
1 SOLUTION TOPICAL ONCE
Qty: 473 ML | Refills: 0 | Status: CANCELLED | OUTPATIENT
Start: 2023-12-08 | End: 2023-12-08

## 2023-12-08 RX ORDER — LIDOCAINE HYDROCHLORIDE 40 MG/ML
5 SOLUTION TOPICAL ONCE
Status: COMPLETED | OUTPATIENT
Start: 2023-12-08 | End: 2023-12-08

## 2023-12-08 RX ADMIN — LIDOCAINE HYDROCHLORIDE 5 ML: 40 SOLUTION TOPICAL at 11:33

## 2023-12-08 NOTE — PATIENT INSTRUCTIONS
Orders Placed This Encounter   Procedures    Wound cleansing and dressings     Wound cleansing and dressings       Shower, No. Do not get dressings wet. R lateral back:   Hold wound vac today. Culture sent today from Right lateral back  Pack undermining of wounds with dakins soaked kerlix. Silver alginate on superficial area of wound. Cover with gauze and ABD's   Secure with Tape. Change DAILY. Perineum and Left and Right Buttock Wounds:   Cleanse wounds with normal saline. Pat dry   Skin prep to gaby-wound  Apply sheet of Acticoat 3 followed by Calcium alginate. Ensure to tuck into communicating areas from left buttocks to perineum and all undermining areas   Cover with ABD's. Change dressings 3 times a week and as needed for excessive drainage         If left hip wounds reopen please dress as follows:  Cleanse/Irrigate wound with normal saline. Cover with silicon bordered foam.   Change three times a week. You need to keep pressure off of right back wound. Continue VNA three daily for dressing changes, except on the day the patient goes to the wound center. Wife will do dressing changes on days VNA does not come. OFF-LOADING   Avoid pressure at wound site. - all wounds, including right back   Wheelchair Cushion. - ROHO cushion   Do Not Sit for Long Periods of Time. - 1 hr max for meals only, otherwise in bed and turn side to side at least   every 3 hours or more frequently   Reposition at least every 1-2 hours while awake. Today's Wound treatment note: Cleansed with NSS and dressed as above. CT chest ordered today. Standing Status:   Future     Standing Expiration Date:   12/8/2024    CT chest w contrast     Standing Status:   Future     Standing Expiration Date:   12/8/2027     Scheduling Instructions:      Nothing to eat 3 hours prior to this test.  We encourage you to drink clear liquid up until the time of your study.  Please bring your insurance cards, a form of photo ID and a list of your medications with you. Arrive 15 minutes prior to your appointment time to register. On the day of your test, please bring any prior CT or MRI studies of this area with you that were not performed at a [facility]. To schedule this appointment, please contact Central Scheduling at [phone]. Order Specific Question:   What is the patient's sedation requirement? If Medication for Claustrophobia is selected, order medication at this point. Answer:   No Sedation     Order Specific Question:   Contrast information:     Answer:   IV     Order Specific Question:   Did the patient ever have a reaction to x-ray dye? If yes, please verify the type of allergy and order the contrast allergy prep. Answer:   No     Order Specific Question:   Release to patient through [facility]     Answer:   Immediate     Order Specific Question:   Reason for Exam (FREE TEXT)     Answer:   right chest wound, osteo?

## 2023-12-08 NOTE — PROGRESS NOTES
Patient ID: Nicholas Munoz is a 59 y.o. male Date of Birth 1960     Chief Complaint  Chief Complaint   Patient presents with   • Follow Up Wound Care Visit     Back wound       Allergies  Patient has no known allergies. Assessment:    No problem-specific Assessment & Plan notes found for this encounter. There are no diagnoses linked to this encounter. Procedures    Plan:     Wound 08/26/20 Pressure Injury Buttocks Left (Active)   Wound Image Images linked 12/08/23 1054       Wound 11/05/21 Pressure Injury Perineum (Active)   Wound Image Images linked 12/08/23 1055   Wound Description Pink;Yellow;Slough 12/08/23 1056   Shelby-wound Assessment Scar Tissue; Maceration 12/08/23 1056   Wound Length (cm) 3 cm 12/08/23 1056   Wound Width (cm) 3.5 cm 12/08/23 1056   Wound Depth (cm) 2.2 cm 12/08/23 1056   Wound Surface Area (cm^2) 10.5 cm^2 12/08/23 1056   Wound Volume (cm^3) 23.1 cm^3 12/08/23 1056   Calculated Wound Volume (cm^3) 23.1 cm^3 12/08/23 1056   Change in Wound Size % -17410 12/08/23 1056   Tunneling 4.7 cm 12/08/23 1056   Tunneling in depth located at communicates with with left buttock at 9 oclock 12/08/23 1056   Undermining 5.1 12/08/23 1056   Undermining is depth extending from 12-4 oclock 12/08/23 1056   Drainage Amount Large 12/08/23 1056   Drainage Description Green;Tan;Milky 12/08/23 1056   Treatments Irrigation with NSS 12/08/23 1056       Wound 08/26/20 Pressure Injury Buttocks Left (Active)   Date First Assessed/Time First Assessed: 08/26/20 1056   Primary Wound Type: Pressure Injury  Location: Buttocks  Wound Location Orientation: Left  Wound Outcome: Palliative       Wound 07/29/21 Pressure Injury Back Right; Lower; Lateral (Active)   Date First Assessed: 07/29/21   Primary Wound Type: Pressure Injury  Location: Back  Wound Location Orientation: Right; Lower; Lateral  Wound Outcome: Palliative       Wound 11/05/21 Pressure Injury Perineum (Active)   Date First Assessed/Time First Assessed: 11/05/21 1059   Primary Wound Type: Pressure Injury  Location: Perineum  Wound Outcome: Palliative       [REMOVED] Wound 08/26/19 Buttocks Left;Distal;Lateral (Removed)   Resolved Date/Resolved Time: 08/26/20 1056  Date First Assessed/Time First Assessed: 08/26/19 1130   Pre-Existing Wound: Yes  Location: Buttocks  Wound Location Orientation: Left;Distal;Lateral  Wound Description (Comments): Deep ischial wound       [REMOVED] Wound 09/16/19 Buttocks Right;Distal (Removed)   Resolved Date/Resolved Time: 08/26/20 1055  Date First Assessed/Time First Assessed: 09/16/19 1657   Pre-Existing Wound: Yes  Location: Buttocks  Wound Location Orientation: Right;Distal       [REMOVED] Wound 10/28/19 Knee Left (Removed)   Resolved Date/Resolved Time: 08/26/20 1055  Date First Assessed/Time First Assessed: 10/28/19 0657   Pre-Existing Wound: Yes  Location: Knee  Wound Location Orientation: Left  Wound Description (Comments): round black  Dressing Status: Open to air       [REMOVED] Wound 10/28/19 Buttocks Left;Mid (Removed)   Resolved Date/Resolved Time: 08/26/20 1056  Date First Assessed/Time First Assessed: 10/28/19 1108   Pre-Existing Wound: Yes  Location: Buttocks  Wound Location Orientation: Left;Mid  Wound Description (Comments): Stage 2 vs traumatic injury       [REMOVED] Wound 11/01/19 Other (Comment) N/A (Removed)   Resolved Date/Resolved Time: 08/26/20 1055  Date First Assessed/Time First Assessed: 11/01/19 1640   Location: Other (Comment)  Wound Location Orientation: N/A  Wound Description (Comments): scrotum dressed with exofin       [REMOVED] Wound 08/26/20 Pressure Injury Hip Left (Removed)   Resolved Date: 10/04/23  Date First Assessed/Time First Assessed: 08/26/20 1057   Primary Wound Type: Pressure Injury  Location: Hip  Wound Location Orientation: Left  Wound Outcome: Palliative       [REMOVED] Wound 05/27/22 Skin Tear Buttocks Left;Proximal (Removed)   Resolved Date/Resolved Time: 08/05/22 0851  Date First Assessed/Time First Assessed: 05/27/22 0946   Primary Wound Type: Skin Tear  Location: Buttocks  Wound Location Orientation: Left;Proximal  Wound Outcome: Healed       [REMOVED] Wound 03/17/23 Pressure Injury Hip Lateral;Left;Proximal (Removed)   Resolved Date: 08/21/23  Date First Assessed/Time First Assessed: 03/17/23 1039   Primary Wound Type: Pressure Injury  Location: Hip  Wound Location Orientation: Lateral;Left;Proximal  Wound Outcome: Healed       [REMOVED] Wound 06/19/23 Pressure Injury Hip Left;Medial (Removed)   Resolved Date: 09/18/23  Date First Assessed: 06/19/23   Present on Original Admission: No  Primary Wound Type: Pressure Injury  Location: Hip  Wound Location Orientation: Left;Medial  Wound Description (Comments): granulation  yellow serous drainage . .. [REMOVED] Wound 10/13/23 Pressure Injury Finger (Comment which one) Right (Removed)   Resolved Date: 11/20/23  Date First Assessed/Time First Assessed: 10/13/23 1035   Primary Wound Type: Pressure Injury  Location: Finger (Comment which one)  Wound Location Orientation: Right  Wound Outcome: Healed       Subjective:      . Mr. Adriana Wesley is a 20-year-old gentleman with paraplegia and history of multiple pressure wounds with 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. He had been rescheduled for a debridement and flap closure by plastics in August but developed a new wound on his right mid to lower back chest wall. This wound probes deep and has been closed on the outside but the tract has not been improving. He had a CT scan that shows a deep tracking to the muscle but no fistulization to the internal thoracic cavity. 02/04/2022 he returns now for re-evaluation. He still has had visiting nurses but has not been seen in the  [facility] since November. He denies any change or complaint    03/11/2022 no changes since been seen last.  No new complaints. 04/22/2022 no issues. No changes. 05/27/2022. Doing well.   Denies fevers or chills. No issues spoke about plastics a re-evaluation but he wants to wait for COVID to wane more and maybe next fall    07/22/2022. He has not been seen here since May mostly due to transportation issues. He has significant more pain and drainage on his right back chest wall wound. Otherwise no changes    08/05/2022 denies fevers or chills. Still in pain on the right side. 3/17/2023 he returns for evaluation. He was seen in the wound center by another provider month or 2 ago. 6/9/2023. Here for long-term follow-up. No significant changes. 7/14/2023. No changes. Doing well.    9/22/2023 he had an outpatient CT scan. The right chest wall wound has increasing drainage. The dressings from the sacral area have some greenish color    10/13/2023. He did receive a VAC. He still has greenish drainage on the dressings    10/27/2023 no significant changes. He states he did get his hospital mattress. Tolerating the VAC well. The following portions of the patient's history were reviewed and updated as appropriate: allergies, current medications, past family history, past medical history, past social history, past surgical history and problem list.    Review of Systems      Objective:       Wound 08/26/20 Pressure Injury Buttocks Left (Active)   Wound Image Images linked 12/08/23 1054       Wound 11/05/21 Pressure Injury Perineum (Active)   Wound Image Images linked 12/08/23 1055   Wound Description Pink;Yellow;Slough 12/08/23 1056   Shelby-wound Assessment Scar Tissue; Maceration 12/08/23 1056   Wound Length (cm) 3 cm 12/08/23 1056   Wound Width (cm) 3.5 cm 12/08/23 1056   Wound Depth (cm) 2.2 cm 12/08/23 1056   Wound Surface Area (cm^2) 10.5 cm^2 12/08/23 1056   Wound Volume (cm^3) 23.1 cm^3 12/08/23 1056   Calculated Wound Volume (cm^3) 23.1 cm^3 12/08/23 1056   Change in Wound Size % -07561 12/08/23 1056   Tunneling 4.7 cm 12/08/23 1056   Tunneling in depth located at communicates with with left buttock at 9 oclock 12/08/23 1056   Undermining 5.1 12/08/23 1056   Undermining is depth extending from 12-4 oclock 12/08/23 1056   Drainage Amount Large 12/08/23 1056   Drainage Description Green;Tan;Milky 12/08/23 1056   Treatments Irrigation with NSS 12/08/23 1056       /66   Pulse 92   Temp 98.9 °F (37.2 °C)   Resp 18     Physical Exam      Wound Instructions:  No orders of the defined types were placed in this encounter. No diagnosis found. back chest wall wound.  Otherwise no changes    08/05/2022 denies fevers or chills.  Still in pain on the right side.    3/17/2023 he returns for evaluation.  He was seen in the wound center by another provider month or 2 ago.    6/9/2023.  Here for long-term follow-up.  No significant changes.    7/14/2023.  No changes.  Doing well.    9/22/2023 he had an outpatient CT scan.  The right chest wall wound has increasing drainage.  The dressings from the sacral area have some greenish color    10/13/2023.  He did receive a VAC.  He still has greenish drainage on the dressings    10/27/2023 no significant changes.  He states he did get his hospital mattress.  Tolerating the VAC well.    12/8/2023.  He has had significant creasing drainage from the VAC dressing from the right back chest wall wound.  Difficulty maintaining enough canisters for the VAC machine.        The following portions of the patient's history were reviewed and updated as appropriate: allergies, current medications, past family history, past medical history, past social history, past surgical history and problem list.    Review of Systems   Constitutional:  Negative for chills and fever.   HENT:  Negative for ear pain and sore throat.    Eyes:  Negative for pain and visual disturbance.   Respiratory:  Negative for cough and shortness of breath.    Cardiovascular:  Negative for chest pain.   Gastrointestinal:  Negative for abdominal pain and vomiting.   Skin:  Positive for wound. Negative for color change.   Neurological:  Negative for seizures and syncope.   Psychiatric/Behavioral:  Negative for agitation and behavioral problems.    All other systems reviewed and are negative.        Objective:       Wound 08/26/20 Pressure Injury Buttocks Left (Active)   Wound Image Images linked 12/08/23 1054   Wound Description Probes to bone;Pink;Yellow;Slough 12/08/23 1101   Wound Length (cm) 4.5 cm 12/08/23 1101   Wound Width (cm) 2.2 cm 12/08/23 1101   Wound  Depth (cm) 1.7 cm 12/08/23 1101   Wound Surface Area (cm^2) 9.9 cm^2 12/08/23 1101   Wound Volume (cm^3) 16.83 cm^3 12/08/23 1101   Calculated Wound Volume (cm^3) 16.83 cm^3 12/08/23 1101   Change in Wound Size % 41.56 12/08/23 1101   Tunneling in depth located at communicates with perineum at 3:00 12/08/23 1101   Undermining 5.5 12/08/23 1101   Undermining is depth extending from 7-11 12/08/23 1101   Drainage Amount Large 12/08/23 1101   Drainage Description Green;Alva;Milky 12/08/23 1101   Non-staged Wound Description Full thickness 12/08/23 1101   Treatments Irrigation with NSS 12/08/23 1101       Wound 07/29/21 Pressure Injury Back Right;Lower;Lateral (Active)   Wound Image Images linked 12/08/23 1106   Wound Description Granulation tissue;Yellow;Pink;Slough;Probes to bone;Exposed bone (probes to rib) 12/08/23 1111   Shelby-wound Assessment Maceration;Pink;Scar Tissue 12/08/23 1111   Wound Length (cm) 4.5 cm 12/08/23 1111   Wound Width (cm) 8.8 cm 12/08/23 1111   Wound Depth (cm) 3.3 cm 12/08/23 1111   Wound Surface Area (cm^2) 39.6 cm^2 12/08/23 1111   Wound Volume (cm^3) 130.68 cm^3 12/08/23 1111   Calculated Wound Volume (cm^3) 130.68 cm^3 12/08/23 1111   Change in Wound Size % -652.76 12/08/23 1111   Undermining 10 12/08/23 1111   Undermining is depth extending from 12-12:00 deepest at 11:00 (undermining to bone) 12/08/23 1111   Drainage Amount Large 12/08/23 1111   Drainage Description Alva;Serosanguineous 12/08/23 1111   Non-staged Wound Description Full thickness 12/08/23 1111   Treatments Irrigation with NSS 12/08/23 1111   Packing- # removed 1 12/08/23 1111   Patient Tolerance Tolerated well 12/08/23 1111       Wound 11/05/21 Pressure Injury Perineum (Active)   Wound Image Images linked 12/08/23 1055   Wound Description Pink;Yellow;Slough 12/08/23 1056   Shelby-wound Assessment Scar Tissue;Maceration 12/08/23 1056   Wound Length (cm) 3 cm 12/08/23 1056   Wound Width (cm) 3.5 cm 12/08/23 1056   Wound Depth  (cm) 2.2 cm 12/08/23 1056   Wound Surface Area (cm^2) 10.5 cm^2 12/08/23 1056   Wound Volume (cm^3) 23.1 cm^3 12/08/23 1056   Calculated Wound Volume (cm^3) 23.1 cm^3 12/08/23 1056   Change in Wound Size % -49535 12/08/23 1056   Tunneling 4.7 cm 12/08/23 1056   Tunneling in depth located at communicates with with left buttock at 9 oclock 12/08/23 1056   Undermining 5.1 12/08/23 1056   Undermining is depth extending from 12-4 oclock 12/08/23 1056   Drainage Amount Large 12/08/23 1056   Drainage Description Green;Tan;Milky 12/08/23 1056   Treatments Irrigation with NSS 12/08/23 1056       /66   Pulse 92   Temp 98.9 °F (37.2 °C)   Resp 18     Physical Exam  Vitals and nursing note reviewed. Exam conducted with a chaperone present.   Constitutional:       Appearance: Normal appearance.   Cardiovascular:      Rate and Rhythm: Normal rate and regular rhythm.   Pulmonary:      Effort: Pulmonary effort is normal.      Breath sounds: Normal breath sounds.   Abdominal:      General: There is no distension.      Palpations: Abdomen is soft. There is no mass.      Tenderness: There is no abdominal tenderness.   Neurological:      Mental Status: He is alert.   Psychiatric:         Mood and Affect: Mood normal.         Behavior: Behavior normal.           Wound Instructions:  Orders Placed This Encounter   Procedures    Wound culture and Gram stain     Standing Status:   Future     Number of Occurrences:   1     Standing Expiration Date:   12/8/2024     Order Specific Question:   Release to patient through [email or web address]     Answer:   Immediate     Order Specific Question:   This patient has an active home care or hospice episode. [facility]?     Answer:   No    CT chest w contrast     Standing Status:   Future     Number of Occurrences:   1     Standing Expiration Date:   12/8/2027     Scheduling Instructions:      Nothing to eat 3 hours prior to this test.  We encourage you to drink clear  liquid up until the time of your study. Please bring your insurance cards, a form of photo ID and a list of your medications with you. Arrive 15 minutes prior to your appointment time to register. On the day of your test, please bring any prior CT or MRI studies of this area with you that were not performed at a [facility].            To schedule this appointment, please contact Central Scheduling at [phone].           Order Specific Question:   What is the patient's sedation requirement? If Medication for Claustrophobia is selected, order medication at this point.     Answer:   No Sedation     Order Specific Question:   Contrast information:     Answer:   IV     Order Specific Question:   Did the patient ever have a reaction to x-ray dye? If yes, please verify the type of allergy and order the contrast allergy prep.     Answer:   No     Order Specific Question:   Release to patient through Mychart     Answer:   Immediate     Order Specific Question:   Reason for Exam (FREE TEXT)     Answer:   right chest wound, osteo?        Diagnosis ICD-10-CM Associated Orders   1. Stage IV pressure ulcer of sacral region ([facility])  L89.154       2. Pressure ulcer of right lower back, stage 3 ([facility])  L89.133 lidocaine (XYLOCAINE) 4 % topical solution 5 mL     Wound culture and Gram stain     Wound culture and Gram stain      3. Pressure injury of contiguous region involving back and left buttock, stage 4 ([facility])  L89.44 lidocaine (XYLOCAINE) 4 % topical solution 5 mL      4. Stage IV pressure ulcer of right lower back ([facility])  L89.134 CT chest w contrast     lidocaine (XYLOCAINE) 4 % topical solution 5 mL     sodium hypochlorite (DAKIN'S QUARTER-STRENGTH)      5. 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 ([facility])  L89.223 lidocaine (XYLOCAINE) 4 % topical solution 5 mL

## 2023-12-10 LAB
BACTERIA WND AEROBE CULT: NORMAL
GRAM STN SPEC: NORMAL

## 2023-12-11 ENCOUNTER — HOME CARE VISIT (OUTPATIENT)
Dept: HOME HEALTH SERVICES | Facility: HOME HEALTHCARE | Age: 63
End: 2023-12-11
Payer: MEDICARE

## 2023-12-11 PROCEDURE — G0299 HHS/HOSPICE OF RN EA 15 MIN: HCPCS

## 2023-12-11 NOTE — ASSESSMENT & PLAN NOTE
The right lower back wound has significant amount of drainage and overwhelming the VAC dressing. Wound cultures were sent to rule out any deep infection. On exam however there were 4 areas of bone exposure which is a new finding. Due to the obvious bone exposure and increased fluid output concern for bone involvement and possible osteo of the ribs. Will check a CT scan of the chest as he is too claustrophobic for an MRI. Will stop the LTAC, located within [facility] and hold it for 2 weeks and switch to Dakin's daily for control. Follow-up in the wound center.

## 2023-12-12 VITALS
TEMPERATURE: 97.6 F | SYSTOLIC BLOOD PRESSURE: 118 MMHG | RESPIRATION RATE: 18 BRPM | DIASTOLIC BLOOD PRESSURE: 68 MMHG | HEART RATE: 78 BPM | OXYGEN SATURATION: 96 %

## 2023-12-12 LAB
BACTERIA WND AEROBE CULT: ABNORMAL
BACTERIA WND AEROBE CULT: ABNORMAL
GRAM STN SPEC: ABNORMAL

## 2023-12-13 ENCOUNTER — HOME CARE VISIT (OUTPATIENT)
Dept: HOME HEALTH SERVICES | Facility: HOME HEALTHCARE | Age: 63
End: 2023-12-13
Payer: MEDICARE

## 2023-12-13 PROCEDURE — G0299 HHS/HOSPICE OF RN EA 15 MIN: HCPCS

## 2023-12-14 ENCOUNTER — HOSPITAL ENCOUNTER (OUTPATIENT)
Dept: CT IMAGING | Facility: HOSPITAL | Age: 63
End: 2023-12-14
Attending: SURGERY
Payer: MEDICARE

## 2023-12-14 DIAGNOSIS — L89.134 STAGE IV PRESSURE ULCER OF RIGHT LOWER BACK (HCC): ICD-10-CM

## 2023-12-14 PROCEDURE — G1004 CDSM NDSC: HCPCS

## 2023-12-14 PROCEDURE — 71260 CT THORAX DX C+: CPT

## 2023-12-14 RX ADMIN — IOHEXOL 75 ML: 350 INJECTION, SOLUTION INTRAVENOUS at 13:57

## 2023-12-15 ENCOUNTER — HOME CARE VISIT (OUTPATIENT)
Dept: HOME HEALTH SERVICES | Facility: HOME HEALTHCARE | Age: 63
End: 2023-12-15
Payer: MEDICARE

## 2023-12-15 VITALS
TEMPERATURE: 97.3 F | OXYGEN SATURATION: 97 % | RESPIRATION RATE: 20 BRPM | DIASTOLIC BLOOD PRESSURE: 68 MMHG | SYSTOLIC BLOOD PRESSURE: 120 MMHG | HEART RATE: 78 BPM

## 2023-12-15 PROCEDURE — G0299 HHS/HOSPICE OF RN EA 15 MIN: HCPCS

## 2023-12-18 ENCOUNTER — HOME CARE VISIT (OUTPATIENT)
Dept: HOME HEALTH SERVICES | Facility: HOME HEALTHCARE | Age: 63
End: 2023-12-18
Payer: MEDICARE

## 2023-12-18 VITALS
DIASTOLIC BLOOD PRESSURE: 60 MMHG | TEMPERATURE: 97.5 F | HEART RATE: 72 BPM | SYSTOLIC BLOOD PRESSURE: 110 MMHG | OXYGEN SATURATION: 96 % | RESPIRATION RATE: 18 BRPM

## 2023-12-18 PROCEDURE — G0299 HHS/HOSPICE OF RN EA 15 MIN: HCPCS

## 2023-12-20 ENCOUNTER — HOME CARE VISIT (OUTPATIENT)
Dept: HOME HEALTH SERVICES | Facility: HOME HEALTHCARE | Age: 63
End: 2023-12-20
Payer: MEDICARE

## 2023-12-20 VITALS
DIASTOLIC BLOOD PRESSURE: 60 MMHG | RESPIRATION RATE: 18 BRPM | OXYGEN SATURATION: 97 % | TEMPERATURE: 97.3 F | SYSTOLIC BLOOD PRESSURE: 110 MMHG | HEART RATE: 82 BPM

## 2023-12-20 DIAGNOSIS — G89.29 CHRONIC LOW BACK PAIN WITHOUT SCIATICA, UNSPECIFIED BACK PAIN LATERALITY: ICD-10-CM

## 2023-12-20 DIAGNOSIS — M54.50 CHRONIC LOW BACK PAIN WITHOUT SCIATICA, UNSPECIFIED BACK PAIN LATERALITY: ICD-10-CM

## 2023-12-20 DIAGNOSIS — G82.20 PARAPLEGIC SPINAL PARALYSIS (HCC): ICD-10-CM

## 2023-12-20 DIAGNOSIS — L89.324 STAGE IV PRESSURE ULCER OF LEFT BUTTOCK (HCC): ICD-10-CM

## 2023-12-20 PROCEDURE — G0299 HHS/HOSPICE OF RN EA 15 MIN: HCPCS

## 2023-12-20 RX ORDER — OXYCODONE HYDROCHLORIDE 15 MG/1
15 TABLET ORAL EVERY 6 HOURS PRN
Qty: 120 TABLET | Refills: 0 | Status: SHIPPED | OUTPATIENT
Start: 2023-12-20

## 2023-12-22 ENCOUNTER — HOME CARE VISIT (OUTPATIENT)
Dept: HOME HEALTH SERVICES | Facility: HOME HEALTHCARE | Age: 63
End: 2023-12-22
Payer: MEDICARE

## 2023-12-22 ENCOUNTER — OFFICE VISIT (OUTPATIENT)
Dept: WOUND CARE | Facility: CLINIC | Age: 63
End: 2023-12-22
Payer: MEDICARE

## 2023-12-22 VITALS
TEMPERATURE: 97.6 F | DIASTOLIC BLOOD PRESSURE: 70 MMHG | OXYGEN SATURATION: 97 % | SYSTOLIC BLOOD PRESSURE: 118 MMHG | HEART RATE: 84 BPM

## 2023-12-22 VITALS
SYSTOLIC BLOOD PRESSURE: 110 MMHG | HEART RATE: 76 BPM | DIASTOLIC BLOOD PRESSURE: 76 MMHG | TEMPERATURE: 98 F | RESPIRATION RATE: 16 BRPM

## 2023-12-22 DIAGNOSIS — N18.1 CONTROLLED TYPE 2 DIABETES MELLITUS WITH STAGE 1 CHRONIC KIDNEY DISEASE, UNSPECIFIED WHETHER LONG TERM INSULIN USE: ICD-10-CM

## 2023-12-22 DIAGNOSIS — E11.22 CONTROLLED TYPE 2 DIABETES MELLITUS WITH STAGE 1 CHRONIC KIDNEY DISEASE, UNSPECIFIED WHETHER LONG TERM INSULIN USE: ICD-10-CM

## 2023-12-22 DIAGNOSIS — L89.133 PRESSURE ULCER OF RIGHT LOWER BACK, STAGE 3 (HCC): Primary | ICD-10-CM

## 2023-12-22 DIAGNOSIS — L89.44 PRESSURE INJURY OF CONTIGUOUS REGION INVOLVING BACK AND LEFT BUTTOCK, STAGE 4 (HCC): ICD-10-CM

## 2023-12-22 DIAGNOSIS — L89.134 STAGE IV PRESSURE ULCER OF RIGHT LOWER BACK (HCC): ICD-10-CM

## 2023-12-22 PROBLEM — M86.28 SUBACUTE OSTEOMYELITIS, OTHER SITE (HCC): Status: ACTIVE | Noted: 2023-12-22

## 2023-12-22 PROCEDURE — 97597 DBRDMT OPN WND 1ST 20 CM/<: CPT | Performed by: SURGERY

## 2023-12-22 PROCEDURE — 99214 OFFICE O/P EST MOD 30 MIN: CPT | Performed by: SURGERY

## 2023-12-22 NOTE — PATIENT INSTRUCTIONS
Orders Placed This Encounter   Procedures    Wound cleansing and dressings     AS PER DISCUSSION WITH DR. MAR:  You are to report to emergency room if symptoms of infection presents (fever, chills, redness, foul drainage).   Please plan to go to hospitals emergency room next week for treatment of osteo to right ribs as discussed.    Wound cleansing and dressings   Shower, No. Do not get dressings wet.         R lateral back:   Con't to hold wound vac.   Pack undermining of wounds with dakins soaked rolled gauze.   Silver alginate on superficial area of wound.   Cover with gauze and ABD's   Secure with Tape.   Change DAILY.       Perineum and Left and Right Buttock Wounds:   Cleanse wounds with normal saline. Pat dry   Skin prep to gaby-wound   Apply sheet of Acticoat 3 followed by Calcium alginate.   Ensure to tuck into communicating areas from left buttocks to perineum and all undermining areas   Cover with ABD's.   Change dressings 3 times a week and as needed for excessive drainage         If left hip wounds reopen please dress as follows:   Cleanse/Irrigate wound with normal saline.   Cover with silicon bordered foam.   Change three times a week.       You need to keep pressure off of right back wound.       Continue VNA three daily for dressing changes, except on the day the patient goes to the wound center.   Wife will do dressing changes on days VNA does not come.       OFF-LOADING   Avoid pressure at wound site. - all wounds, including right back   Wheelchair Cushion. - ROHO cushion   Do Not Sit for Long Periods of Time. - 1 hr max for meals only, otherwise in bed and turn side to side at least   every 3 hours or more frequently   Reposition at least every 1-2 hours while awake.              Today's Wound treatment note: Farrah Merritt debrided your wounds, we cleansed with NSS and dressed as above.     Standing Status:   Future     Standing Expiration Date:   12/22/2024

## 2023-12-22 NOTE — PROGRESS NOTES
Patient ID: Rikki Arguello is a 63 y.o. male Date of Birth 1960     Chief Complaint  Chief Complaint   Patient presents with    Follow Up Wound Care Visit     Follow up visit for multiple chronic wounds.  Pt denies any issues or concerns since last visit.        Allergies  Patient has no known allergies.    Assessment:    Subacute osteomyelitis, other site (HCC)  I reviewed his CT scan and spoke to him about the results.  He has evidence of osteomyelitis of the 6, 7 and eighth ribs with some pathologic fractures.  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  I explained that treatment for this will need admission and evaluation by infectious disease as well as likely thoracic surgery and plastic surgery.  He may need resection of the infected bone with flap repairs.  I offered the option of proceeding to the emergency department at [facility] to be evaluated likely be admitted.  However he is not interested in being admitted over the holidays.  Currently he does not have any signs of sepsis.  I explained that if he develops any changes that he needs to go to the ER sooner.  Otherwise his plan is to go to the hospital after the holiday.  As a backup I will make an appointment for 2 weeks for long-term follow-up.    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  Continue with the Acticoat    Stage IV pressure ulcer of right lower back (HCC)  Continue with packing with Dakin's daily.       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    Debridement Details  Performed by: physician  Debridement type: selective  Pain control: lidocaine 4%      Post-debridement measurements  Length (cm): 4.5  Width (cm): 1.8  Depth (cm): 2  Percent debrided: 100%  Surface Area (cm^2): 8.1  Area Debrided (cm^2): 8.1  Volume (cm^3): 16.2    Devitalized tissue debrided: biofilm and slough  Instrument(s) utilized: curette  Procedural pain (0-10): insensate  Post-procedural pain: insensate   Response to treatment: procedure was tolerated well        Plan:     Wound 08/26/20 Pressure Injury Buttocks Left (Active)   Wound Image Images linked 12/22/23 1109   Wound Description Slough;White;Yellow;Pink;Probes to bone 12/22/23 1110   Shelby-wound Assessment Maceration;Scar Tissue (rolled edges) 12/22/23 1110   Wound Length (cm) 4.5 cm 12/22/23 1110   Wound Width (cm) 1.8 cm 12/22/23 1110   Wound Depth (cm) 2 cm 12/22/23 1110   Wound Surface Area (cm^2) 8.1 cm^2 12/22/23 1110   Wound Volume (cm^3) 16.2 cm^3 12/22/23 1110   Calculated Wound Volume (cm^3) 16.2 cm^3 12/22/23 1110   Change in Wound Size % 43.75 12/22/23 1110   Tunneling 5.5 cm 12/22/23 1110   Tunneling in depth located at communicates with perineum at 3 oclock 12/22/23 1110   Undermining 6.6 12/22/23 1110   Undermining is depth extending from 7-11 oclock 12/22/23 1110   Drainage Amount Copious 12/22/23 1110   Drainage Description Green;Alva;Milky 12/22/23 1110   Non-staged Wound Description Full thickness 12/22/23 1110   Dressing Status Intact 12/22/23 1110       Wound 07/29/21 Pressure Injury Back Right;Lower;Lateral (Active)   Wound Image " Images linked 12/22/23 1126   Wound Description Granulation tissue;Yellow;Pink;Slough;Probes to bone;Exposed bone (probes to rib cage) 12/22/23 1118   Shelby-wound Assessment Pink;Scar Tissue 12/22/23 1118   Wound Length (cm) 5.4 cm 12/22/23 1118   Wound Width (cm) 6 cm 12/22/23 1118   Wound Depth (cm) 2 cm 12/22/23 1118   Wound Surface Area (cm^2) 32.4 cm^2 12/22/23 1118   Wound Volume (cm^3) 64.8 cm^3 12/22/23 1118   Calculated Wound Volume (cm^3) 64.8 cm^3 12/22/23 1118   Change in Wound Size % -273.27 12/22/23 1118   Undermining 9.5 12/22/23 1118   Undermining is depth extending from 12-12 oclock, deepeast at 11 oclock 12/22/23 1118   Drainage Amount Copious 12/22/23 1118   Drainage Description Tan;Serosanguineous;Green 12/22/23 1118   Non-staged Wound Description Full thickness 12/22/23 1118   Packing- # removed 1 12/22/23 1118   Dressing Status Intact 12/22/23 1118       Wound 11/05/21 Pressure Injury Perineum (Active)   Wound Image Images linked 12/22/23 1109   Wound Description Pink;Yellow;Slough 12/22/23 1114   Shelby-wound Assessment Scar Tissue;Maceration 12/22/23 1114   Wound Length (cm) 3 cm 12/22/23 1114   Wound Width (cm) 2.9 cm 12/22/23 1114   Wound Depth (cm) 2.2 cm 12/22/23 1114   Wound Surface Area (cm^2) 8.7 cm^2 12/22/23 1114   Wound Volume (cm^3) 19.14 cm^3 12/22/23 1114   Calculated Wound Volume (cm^3) 19.14 cm^3 12/22/23 1114   Change in Wound Size % -58131 12/22/23 1114   Tunneling 4.4 cm 12/22/23 1114   Tunneling in depth located at cummunicates with left buttocks at 9 oclock 12/22/23 1114   Undermining 4.8 12/22/23 1114   Undermining is depth extending from 12-4 oclock 12/22/23 1114   Drainage Amount Copious 12/22/23 1114   Drainage Description Green;Tan;Milky 12/22/23 1114   Non-staged Wound Description Full thickness 12/22/23 1114   Dressing Status Intact 12/22/23 1114       Wound 08/26/20 Pressure Injury Buttocks Left (Active)   Date First Assessed/Time First Assessed: 08/26/20 1057    Primary Wound Type: Pressure Injury  Location: Buttocks  Wound Location Orientation: Left  Wound Outcome: Palliative       Wound 07/29/21 Pressure Injury Back Right;Lower;Lateral (Active)   Date First Assessed: 07/29/21   Primary Wound Type: Pressure Injury  Location: Back  Wound Location Orientation: Right;Lower;Lateral  Wound Outcome: Palliative       Wound 11/05/21 Pressure Injury Perineum (Active)   Date First Assessed/Time First Assessed: 11/05/21 1059   Primary Wound Type: Pressure Injury  Location: Perineum  Wound Outcome: Palliative       [REMOVED] Wound 08/26/19 Buttocks Left;Distal;Lateral (Removed)   Resolved Date/Resolved Time: 08/26/20 1056  Date First Assessed/Time First Assessed: 08/26/19 1130   Pre-Existing Wound: Yes  Location: Buttocks  Wound Location Orientation: Left;Distal;Lateral  Wound Description (Comments): Deep ischial wound       [REMOVED] Wound 09/16/19 Buttocks Right;Distal (Removed)   Resolved Date/Resolved Time: 08/26/20 1055  Date First Assessed/Time First Assessed: 09/16/19 1657   Pre-Existing Wound: Yes  Location: Buttocks  Wound Location Orientation: Right;Distal       [REMOVED] Wound 10/28/19 Knee Left (Removed)   Resolved Date/Resolved Time: 08/26/20 1055  Date First Assessed/Time First Assessed: 10/28/19 0657   Pre-Existing Wound: Yes  Location: Knee  Wound Location Orientation: Left  Wound Description (Comments): round black  Dressing Status: Open to air       [REMOVED] Wound 10/28/19 Buttocks Left;Mid (Removed)   Resolved Date/Resolved Time: 08/26/20 1056  Date First Assessed/Time First Assessed: 10/28/19 1108   Pre-Existing Wound: Yes  Location: Buttocks  Wound Location Orientation: Left;Mid  Wound Description (Comments): Stage 2 vs traumatic injury       [REMOVED] Wound 11/01/19 Other (Comment) N/A (Removed)   Resolved Date/Resolved Time: 08/26/20 1055  Date First Assessed/Time First Assessed: 11/01/19 1640   Location: Other (Comment)  Wound Location Orientation: N/A  Wound  Description (Comments): scrotum dressed with exofin       [REMOVED] Wound 08/26/20 Pressure Injury Hip Left (Removed)   Resolved Date: 10/04/23  Date First Assessed/Time First Assessed: 08/26/20 1057   Primary Wound Type: Pressure Injury  Location: Hip  Wound Location Orientation: Left  Wound Outcome: Palliative       [REMOVED] Wound 05/27/22 Skin Tear Buttocks Left;Proximal (Removed)   Resolved Date/Resolved Time: 08/05/22 0851  Date First Assessed/Time First Assessed: 05/27/22 0946   Primary Wound Type: Skin Tear  Location: Buttocks  Wound Location Orientation: Left;Proximal  Wound Outcome: Healed       [REMOVED] Wound 03/17/23 Pressure Injury Hip Lateral;Left;Proximal (Removed)   Resolved Date: 08/21/23  Date First Assessed/Time First Assessed: 03/17/23 1039   Primary Wound Type: Pressure Injury  Location: Hip  Wound Location Orientation: Lateral;Left;Proximal  Wound Outcome: Healed       [REMOVED] Wound 06/19/23 Pressure Injury Hip Left;Medial (Removed)   Resolved Date: 09/18/23  Date First Assessed: 06/19/23   Present on Original Admission: No  Primary Wound Type: Pressure Injury  Location: Hip  Wound Location Orientation: Left;Medial  Wound Description (Comments): granulation  yellow serous drainage ...       [REMOVED] Wound 10/13/23 Pressure Injury Finger (Comment which one) Right (Removed)   Resolved Date: 11/20/23  Date First Assessed/Time First Assessed: 10/13/23 1035   Primary Wound Type: Pressure Injury  Location: Finger (Comment which one)  Wound Location Orientation: Right  Wound Outcome: Healed       Subjective:      .    Mr. Arguello is a 61-year-old gentleman with paraplegia and history of multiple pressure wounds with 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.  He had been rescheduled for a debridement and flap closure by plastics in August but developed a new wound on his right mid to lower back chest wall.  This wound probes deep and has been closed on the outside but the tract has not been  improving.  He had a CT scan that shows a deep tracking to the muscle but no fistulization to the internal thoracic cavity.    02/04/2022 he returns now for re-evaluation.  He still has had visiting nurses but has not been seen in the Wound Center since November.  He denies any change or complaint    03/11/2022 no changes since been seen last.  No new complaints.    04/22/2022 no issues.  No changes.    05/27/2022.  Doing well.  Denies fevers or chills.  No issues spoke about plastics a re-evaluation but he wants to wait for COVID to wane more and maybe next fall    07/22/2022.  He has not been seen here since May mostly due to transportation issues.  He has significant more pain and drainage on his right back chest wall wound.  Otherwise no changes    08/05/2022 denies fevers or chills.  Still in pain on the right side.    3/17/2023 he returns for evaluation.  He was seen in the wound center by another provider month or 2 ago.    6/9/2023.  Here for long-term follow-up.  No significant changes.    7/14/2023.  No changes.  Doing well.    9/22/2023 he had an outpatient CT scan.  The right chest wall wound has increasing drainage.  The dressings from the sacral area have some greenish color    10/13/2023.  He did receive a VAC.  He still has greenish drainage on the dressings    10/27/2023 no significant changes.  He states he did get his hospital mattress.  Tolerating the VAC well.    12/8/2023.  He has had significant creasing drainage from the VAC dressing from the right back chest wall wound.  Difficulty maintaining enough canisters for the VAC machine.    12/22/2023 he denies any change.  Denies any fevers or chills.  Denies any issues.        The following portions of the patient's history were reviewed and updated as appropriate: allergies, current medications, past family history, past medical history, past social history, past surgical history and problem list.    Review of Systems   Constitutional:  Negative  for chills and fever.   HENT:  Negative for ear pain and sore throat.    Eyes:  Negative for pain and visual disturbance.   Respiratory:  Negative for cough and shortness of breath.    Cardiovascular:  Negative for chest pain.   Gastrointestinal:  Negative for abdominal pain and vomiting.   Skin:  Positive for wound. Negative for color change.   Neurological:  Negative for seizures and syncope.   Psychiatric/Behavioral:  Negative for agitation and behavioral problems.    All other systems reviewed and are negative.        Objective:       Wound 08/26/20 Pressure Injury Buttocks Left (Active)   Wound Image Images linked 12/22/23 1109   Wound Description Slough;White;Yellow;Pink;Probes to bone 12/22/23 1110   Shelby-wound Assessment Maceration;Scar Tissue (rolled edges) 12/22/23 1110   Wound Length (cm) 4.5 cm 12/22/23 1110   Wound Width (cm) 1.8 cm 12/22/23 1110   Wound Depth (cm) 2 cm 12/22/23 1110   Wound Surface Area (cm^2) 8.1 cm^2 12/22/23 1110   Wound Volume (cm^3) 16.2 cm^3 12/22/23 1110   Calculated Wound Volume (cm^3) 16.2 cm^3 12/22/23 1110   Change in Wound Size % 43.75 12/22/23 1110   Tunneling 5.5 cm 12/22/23 1110   Tunneling in depth located at communicates with perineum at 3 oclock 12/22/23 1110   Undermining 6.6 12/22/23 1110   Undermining is depth extending from 7-11 oclock 12/22/23 1110   Drainage Amount Copious 12/22/23 1110   Drainage Description Green;Alva;Milky 12/22/23 1110   Non-staged Wound Description Full thickness 12/22/23 1110   Dressing Status Intact 12/22/23 1110       Wound 07/29/21 Pressure Injury Back Right;Lower;Lateral (Active)   Wound Image Images linked 12/22/23 1126   Wound Description Granulation tissue;Yellow;Pink;Slough;Probes to bone;Exposed bone (probes to rib cage) 12/22/23 1118   Shelby-wound Assessment Pink;Scar Tissue 12/22/23 1118   Wound Length (cm) 5.4 cm 12/22/23 1118   Wound Width (cm) 6 cm 12/22/23 1118   Wound Depth (cm) 2 cm 12/22/23 1118   Wound Surface Area (cm^2)  32.4 cm^2 12/22/23 1118   Wound Volume (cm^3) 64.8 cm^3 12/22/23 1118   Calculated Wound Volume (cm^3) 64.8 cm^3 12/22/23 1118   Change in Wound Size % -273.27 12/22/23 1118   Undermining 9.5 12/22/23 1118   Undermining is depth extending from 12-12 oclock, deepeast at 11 oclock 12/22/23 1118   Drainage Amount Copious 12/22/23 1118   Drainage Description Tan;Serosanguineous;Green 12/22/23 1118   Non-staged Wound Description Full thickness 12/22/23 1118   Packing- # removed 1 12/22/23 1118   Dressing Status Intact 12/22/23 1118       Wound 11/05/21 Pressure Injury Perineum (Active)   Wound Image Images linked 12/22/23 1109   Wound Description Pink;Yellow;Slough 12/22/23 1114   Shelby-wound Assessment Scar Tissue;Maceration 12/22/23 1114   Wound Length (cm) 3 cm 12/22/23 1114   Wound Width (cm) 2.9 cm 12/22/23 1114   Wound Depth (cm) 2.2 cm 12/22/23 1114   Wound Surface Area (cm^2) 8.7 cm^2 12/22/23 1114   Wound Volume (cm^3) 19.14 cm^3 12/22/23 1114   Calculated Wound Volume (cm^3) 19.14 cm^3 12/22/23 1114   Change in Wound Size % -33978 12/22/23 1114   Tunneling 4.4 cm 12/22/23 1114   Tunneling in depth located at cummunicates with left buttocks at 9 oclock 12/22/23 1114   Undermining 4.8 12/22/23 1114   Undermining is depth extending from 12-4 oclock 12/22/23 1114   Drainage Amount Copious 12/22/23 1114   Drainage Description Green;Tan;Milky 12/22/23 1114   Non-staged Wound Description Full thickness 12/22/23 1114   Dressing Status Intact 12/22/23 1114       /76   Pulse 76   Temp 98 °F (36.7 °C) (Tympanic)   Resp 16     Physical Exam  Vitals and nursing note reviewed. Exam conducted with a chaperone present.   Constitutional:       Appearance: Normal appearance.   Cardiovascular:      Rate and Rhythm: Normal rate and regular rhythm.   Pulmonary:      Effort: Pulmonary effort is normal.      Breath sounds: Normal breath sounds.   Abdominal:      General: There is no distension.      Palpations: Abdomen is  soft. There is no mass.      Tenderness: There is no abdominal tenderness.   Neurological:      Mental Status: He is alert.   Psychiatric:         Mood and Affect: Mood normal.         Behavior: Behavior normal.           Wound Instructions:  Orders Placed This Encounter   Procedures    Wound cleansing and dressings     AS PER DISCUSSION WITH DR. MAR:  You are to report to emergency room if symptoms of infection presents (fever, chills, redness, foul drainage).   Please plan to go to \[facility]\"" emergency room next week for treatment of osteo to right ribs as discussed.    Wound cleansing and dressings   Shower, No. Do not get dressings wet.         R lateral back:   Con't to hold wound vac.   Pack undermining of wounds with dakins soaked rolled gauze.   Silver alginate on superficial area of wound.   Cover with gauze and ABD's   Secure with Tape.   Change DAILY.       Perineum and Left and Right Buttock Wounds:   Cleanse wounds with normal saline. Pat dry   Skin prep to gaby-wound   Apply sheet of Acticoat 3 followed by Calcium alginate.   Ensure to tuck into communicating areas from left buttocks to perineum and all undermining areas   Cover with ABD's.   Change dressings 3 times a week and as needed for excessive drainage         If left hip wounds reopen please dress as follows:   Cleanse/Irrigate wound with normal saline.   Cover with silicon bordered foam.   Change three times a week.       You need to keep pressure off of right back wound.       Continue VNA three daily for dressing changes, except on the day the patient goes to the wound center.   Wife will do dressing changes on days VNA does not come.       OFF-LOADING   Avoid pressure at wound site. - all wounds, including right back   Wheelchair Cushion. - ROHO cushion   Do Not Sit for Long Periods of Time. - 1 hr max for meals only, otherwise in bed and turn side to side at least   every 3 hours or more frequently   Reposition at least every 1-2 hours while  awake.              Today's Wound treatment note: Farrah Merritt debrided your wounds, we cleansed with NSS and dressed as above.     Standing Status:   Future     Standing Expiration Date:   12/22/2024    Debridement     This order was created via procedure documentation        Diagnosis ICD-10-CM Associated Orders   1. Pressure ulcer of right lower back, stage 3 ([facility])  L89.133 Wound cleansing and dressings      2. Pressure injury of contiguous region involving back and left buttock, stage 4 ([facility])  L89.44 Wound cleansing and dressings      3. Controlled type 2 diabetes mellitus with stage 1 chronic kidney disease, unspecified whether long term insulin use   E11.22     N18.1       4. Stage IV pressure ulcer of right lower back ([facility])  L89.134

## 2023-12-22 NOTE — ASSESSMENT & PLAN NOTE
I reviewed his CT scan and spoke to him about the results.  He has evidence of osteomyelitis of the 6, 7 and eighth ribs with some pathologic fractures.  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  I explained that treatment for this will need admission and evaluation by infectious disease as well as likely thoracic surgery and plastic surgery.  He may need resection of the infected bone with flap repairs.  I offered the option of proceeding to the emergency department at [facility] to be evaluated likely be admitted.  However he is not interested in being admitted over the holidays.  Currently he does not have any signs of sepsis.  I explained that if he develops any changes that he needs to go to the ER sooner.  Otherwise his plan is to go to the hospital after the holiday.  As a backup I will make an appointment for 2 weeks for long-term follow-up.

## 2023-12-23 ENCOUNTER — HOME CARE VISIT (OUTPATIENT)
Dept: HOME HEALTH SERVICES | Facility: HOME HEALTHCARE | Age: 63
End: 2023-12-23
Payer: MEDICARE

## 2023-12-23 VITALS
DIASTOLIC BLOOD PRESSURE: 60 MMHG | TEMPERATURE: 98.2 F | RESPIRATION RATE: 20 BRPM | OXYGEN SATURATION: 98 % | SYSTOLIC BLOOD PRESSURE: 102 MMHG | HEART RATE: 72 BPM

## 2023-12-23 PROCEDURE — G0299 HHS/HOSPICE OF RN EA 15 MIN: HCPCS

## 2023-12-24 ENCOUNTER — HOME CARE VISIT (OUTPATIENT)
Dept: HOME HEALTH SERVICES | Facility: HOME HEALTHCARE | Age: 63
End: 2023-12-24
Payer: MEDICARE

## 2023-12-24 VITALS
SYSTOLIC BLOOD PRESSURE: 104 MMHG | TEMPERATURE: 97.4 F | OXYGEN SATURATION: 98 % | HEART RATE: 70 BPM | RESPIRATION RATE: 16 BRPM | DIASTOLIC BLOOD PRESSURE: 58 MMHG

## 2023-12-24 PROCEDURE — G0299 HHS/HOSPICE OF RN EA 15 MIN: HCPCS

## 2023-12-25 ENCOUNTER — HOME CARE VISIT (OUTPATIENT)
Dept: HOME HEALTH SERVICES | Facility: HOME HEALTHCARE | Age: 63
End: 2023-12-25
Payer: MEDICARE

## 2023-12-25 VITALS
OXYGEN SATURATION: 98 % | DIASTOLIC BLOOD PRESSURE: 78 MMHG | SYSTOLIC BLOOD PRESSURE: 104 MMHG | TEMPERATURE: 96.5 F | RESPIRATION RATE: 14 BRPM | HEART RATE: 72 BPM

## 2023-12-25 PROCEDURE — G0300 HHS/HOSPICE OF LPN EA 15 MIN: HCPCS

## 2023-12-26 ENCOUNTER — HOME CARE VISIT (OUTPATIENT)
Dept: HOME HEALTH SERVICES | Facility: HOME HEALTHCARE | Age: 63
End: 2023-12-26
Payer: MEDICARE

## 2023-12-26 PROCEDURE — 10330064

## 2023-12-26 PROCEDURE — G0299 HHS/HOSPICE OF RN EA 15 MIN: HCPCS

## 2023-12-27 ENCOUNTER — HOME CARE VISIT (OUTPATIENT)
Dept: HOME HEALTH SERVICES | Facility: HOME HEALTHCARE | Age: 63
End: 2023-12-27
Payer: MEDICARE

## 2023-12-27 PROCEDURE — G0299 HHS/HOSPICE OF RN EA 15 MIN: HCPCS

## 2023-12-28 ENCOUNTER — HOME CARE VISIT (OUTPATIENT)
Dept: HOME HEALTH SERVICES | Facility: HOME HEALTHCARE | Age: 63
End: 2023-12-28
Payer: MEDICARE

## 2023-12-28 VITALS
HEART RATE: 78 BPM | OXYGEN SATURATION: 97 % | TEMPERATURE: 97.7 F | SYSTOLIC BLOOD PRESSURE: 110 MMHG | RESPIRATION RATE: 18 BRPM | DIASTOLIC BLOOD PRESSURE: 62 MMHG

## 2023-12-28 VITALS
SYSTOLIC BLOOD PRESSURE: 116 MMHG | OXYGEN SATURATION: 97 % | RESPIRATION RATE: 18 BRPM | DIASTOLIC BLOOD PRESSURE: 70 MMHG | HEART RATE: 76 BPM | TEMPERATURE: 97 F

## 2023-12-28 PROCEDURE — G0299 HHS/HOSPICE OF RN EA 15 MIN: HCPCS

## 2023-12-29 ENCOUNTER — HOME CARE VISIT (OUTPATIENT)
Dept: HOME HEALTH SERVICES | Facility: HOME HEALTHCARE | Age: 63
End: 2023-12-29
Payer: MEDICARE

## 2023-12-29 PROCEDURE — G0299 HHS/HOSPICE OF RN EA 15 MIN: HCPCS

## 2023-12-30 ENCOUNTER — HOME CARE VISIT (OUTPATIENT)
Dept: HOME HEALTH SERVICES | Facility: HOME HEALTHCARE | Age: 63
End: 2023-12-30
Payer: MEDICARE

## 2023-12-30 VITALS
RESPIRATION RATE: 18 BRPM | TEMPERATURE: 97.8 F | DIASTOLIC BLOOD PRESSURE: 60 MMHG | OXYGEN SATURATION: 97 % | HEART RATE: 84 BPM | SYSTOLIC BLOOD PRESSURE: 112 MMHG

## 2023-12-30 VITALS — RESPIRATION RATE: 16 BRPM

## 2023-12-30 PROCEDURE — G0299 HHS/HOSPICE OF RN EA 15 MIN: HCPCS

## 2023-12-31 ENCOUNTER — HOME CARE VISIT (OUTPATIENT)
Dept: HOME HEALTH SERVICES | Facility: HOME HEALTHCARE | Age: 63
End: 2023-12-31
Payer: MEDICARE

## 2023-12-31 VITALS
HEART RATE: 50 BPM | OXYGEN SATURATION: 100 % | SYSTOLIC BLOOD PRESSURE: 130 MMHG | TEMPERATURE: 97.8 F | RESPIRATION RATE: 16 BRPM | DIASTOLIC BLOOD PRESSURE: 80 MMHG

## 2023-12-31 VITALS
DIASTOLIC BLOOD PRESSURE: 70 MMHG | OXYGEN SATURATION: 97 % | SYSTOLIC BLOOD PRESSURE: 112 MMHG | HEART RATE: 84 BPM | RESPIRATION RATE: 16 BRPM | TEMPERATURE: 97.4 F

## 2023-12-31 PROCEDURE — G0299 HHS/HOSPICE OF RN EA 15 MIN: HCPCS

## 2024-01-01 ENCOUNTER — HOME CARE VISIT (OUTPATIENT)
Dept: HOME HEALTH SERVICES | Facility: HOME HEALTHCARE | Age: 64
End: 2024-01-01
Payer: MEDICARE

## 2024-01-01 PROCEDURE — G0299 HHS/HOSPICE OF RN EA 15 MIN: HCPCS

## 2024-01-02 ENCOUNTER — HOME CARE VISIT (OUTPATIENT)
Dept: HOME HEALTH SERVICES | Facility: HOME HEALTHCARE | Age: 64
End: 2024-01-02
Payer: MEDICARE

## 2024-01-02 VITALS
HEART RATE: 84 BPM | TEMPERATURE: 97.2 F | SYSTOLIC BLOOD PRESSURE: 96 MMHG | RESPIRATION RATE: 18 BRPM | OXYGEN SATURATION: 99 % | DIASTOLIC BLOOD PRESSURE: 68 MMHG

## 2024-01-02 PROCEDURE — G0300 HHS/HOSPICE OF LPN EA 15 MIN: HCPCS

## 2024-01-03 ENCOUNTER — HOME CARE VISIT (OUTPATIENT)
Dept: HOME HEALTH SERVICES | Facility: HOME HEALTHCARE | Age: 64
End: 2024-01-03
Payer: MEDICARE

## 2024-01-03 VITALS
HEART RATE: 84 BPM | RESPIRATION RATE: 18 BRPM | DIASTOLIC BLOOD PRESSURE: 70 MMHG | TEMPERATURE: 98.1 F | SYSTOLIC BLOOD PRESSURE: 118 MMHG | OXYGEN SATURATION: 97 %

## 2024-01-03 VITALS
OXYGEN SATURATION: 97 % | RESPIRATION RATE: 18 BRPM | TEMPERATURE: 97.4 F | HEART RATE: 78 BPM | SYSTOLIC BLOOD PRESSURE: 132 MMHG | DIASTOLIC BLOOD PRESSURE: 72 MMHG

## 2024-01-03 PROCEDURE — G0299 HHS/HOSPICE OF RN EA 15 MIN: HCPCS

## 2024-01-04 ENCOUNTER — HOME CARE VISIT (OUTPATIENT)
Dept: HOME HEALTH SERVICES | Facility: HOME HEALTHCARE | Age: 64
End: 2024-01-04
Payer: MEDICARE

## 2024-01-04 VITALS
HEART RATE: 60 BPM | RESPIRATION RATE: 16 BRPM | SYSTOLIC BLOOD PRESSURE: 120 MMHG | TEMPERATURE: 98.1 F | OXYGEN SATURATION: 100 % | DIASTOLIC BLOOD PRESSURE: 70 MMHG

## 2024-01-04 PROCEDURE — G0300 HHS/HOSPICE OF LPN EA 15 MIN: HCPCS

## 2024-01-05 ENCOUNTER — HOME CARE VISIT (OUTPATIENT)
Dept: HOME HEALTH SERVICES | Facility: HOME HEALTHCARE | Age: 64
End: 2024-01-05
Payer: MEDICARE

## 2024-01-05 VITALS
RESPIRATION RATE: 20 BRPM | OXYGEN SATURATION: 99 % | HEART RATE: 60 BPM | SYSTOLIC BLOOD PRESSURE: 122 MMHG | TEMPERATURE: 98.4 F | DIASTOLIC BLOOD PRESSURE: 80 MMHG

## 2024-01-05 PROCEDURE — G0299 HHS/HOSPICE OF RN EA 15 MIN: HCPCS

## 2024-01-06 ENCOUNTER — HOME CARE VISIT (OUTPATIENT)
Dept: HOME HEALTH SERVICES | Facility: HOME HEALTHCARE | Age: 64
End: 2024-01-06
Payer: MEDICARE

## 2024-01-06 VITALS
OXYGEN SATURATION: 98 % | TEMPERATURE: 96.9 F | SYSTOLIC BLOOD PRESSURE: 144 MMHG | HEART RATE: 52 BPM | RESPIRATION RATE: 20 BRPM | DIASTOLIC BLOOD PRESSURE: 86 MMHG

## 2024-01-06 PROCEDURE — G0300 HHS/HOSPICE OF LPN EA 15 MIN: HCPCS

## 2024-01-06 NOTE — CASE COMMUNICATION
Ship to x Pt. Home    Ordering MD Lexy Bo (home health only)    Insurance  AB    Ostomy  include Brand and order number (ordered by the box)  NOT STOCKED  Secor x   Pouches 2 piece closed ostomy pouch transparent filter. REF [id number]   1 box

## 2024-01-07 ENCOUNTER — HOME CARE VISIT (OUTPATIENT)
Dept: HOME HEALTH SERVICES | Facility: HOME HEALTHCARE | Age: 64
End: 2024-01-07
Payer: MEDICARE

## 2024-01-07 PROCEDURE — G0299 HHS/HOSPICE OF RN EA 15 MIN: HCPCS

## 2024-01-08 ENCOUNTER — HOME CARE VISIT (OUTPATIENT)
Dept: HOME HEALTH SERVICES | Facility: HOME HEALTHCARE | Age: 64
End: 2024-01-08
Payer: MEDICARE

## 2024-01-08 PROCEDURE — G0299 HHS/HOSPICE OF RN EA 15 MIN: HCPCS

## 2024-01-09 ENCOUNTER — HOME CARE VISIT (OUTPATIENT)
Dept: HOME HEALTH SERVICES | Facility: HOME HEALTHCARE | Age: 64
End: 2024-01-09
Payer: MEDICARE

## 2024-01-09 VITALS
DIASTOLIC BLOOD PRESSURE: 70 MMHG | OXYGEN SATURATION: 96 % | HEART RATE: 60 BPM | RESPIRATION RATE: 18 BRPM | SYSTOLIC BLOOD PRESSURE: 130 MMHG

## 2024-01-09 PROCEDURE — G0299 HHS/HOSPICE OF RN EA 15 MIN: HCPCS

## 2024-01-10 ENCOUNTER — HOME CARE VISIT (OUTPATIENT)
Dept: HOME HEALTH SERVICES | Facility: HOME HEALTHCARE | Age: 64
End: 2024-01-10
Payer: MEDICARE

## 2024-01-10 VITALS
DIASTOLIC BLOOD PRESSURE: 70 MMHG | SYSTOLIC BLOOD PRESSURE: 110 MMHG | RESPIRATION RATE: 18 BRPM | OXYGEN SATURATION: 97 % | HEART RATE: 76 BPM | TEMPERATURE: 97.6 F

## 2024-01-10 PROCEDURE — G0299 HHS/HOSPICE OF RN EA 15 MIN: HCPCS

## 2024-01-11 ENCOUNTER — HOME CARE VISIT (OUTPATIENT)
Dept: HOME HEALTH SERVICES | Facility: HOME HEALTHCARE | Age: 64
End: 2024-01-11
Payer: MEDICARE

## 2024-01-11 PROCEDURE — G0299 HHS/HOSPICE OF RN EA 15 MIN: HCPCS

## 2024-01-12 ENCOUNTER — HOME CARE VISIT (OUTPATIENT)
Dept: HOME HEALTH SERVICES | Facility: HOME HEALTHCARE | Age: 64
End: 2024-01-12
Payer: MEDICARE

## 2024-01-12 VITALS
SYSTOLIC BLOOD PRESSURE: 112 MMHG | TEMPERATURE: 97.6 F | HEART RATE: 78 BPM | RESPIRATION RATE: 16 BRPM | DIASTOLIC BLOOD PRESSURE: 60 MMHG | OXYGEN SATURATION: 96 %

## 2024-01-12 PROCEDURE — G0299 HHS/HOSPICE OF RN EA 15 MIN: HCPCS

## 2024-01-13 ENCOUNTER — HOME CARE VISIT (OUTPATIENT)
Dept: HOME HEALTH SERVICES | Facility: HOME HEALTHCARE | Age: 64
End: 2024-01-13
Payer: MEDICARE

## 2024-01-13 VITALS
TEMPERATURE: 98 F | OXYGEN SATURATION: 97 % | RESPIRATION RATE: 18 BRPM | HEART RATE: 76 BPM | DIASTOLIC BLOOD PRESSURE: 68 MMHG | SYSTOLIC BLOOD PRESSURE: 110 MMHG

## 2024-01-13 VITALS
HEART RATE: 60 BPM | RESPIRATION RATE: 16 BRPM | DIASTOLIC BLOOD PRESSURE: 70 MMHG | TEMPERATURE: 98.8 F | OXYGEN SATURATION: 98 % | SYSTOLIC BLOOD PRESSURE: 130 MMHG

## 2024-01-13 VITALS
DIASTOLIC BLOOD PRESSURE: 70 MMHG | HEART RATE: 80 BPM | TEMPERATURE: 97.6 F | OXYGEN SATURATION: 97 % | SYSTOLIC BLOOD PRESSURE: 118 MMHG | RESPIRATION RATE: 18 BRPM

## 2024-01-13 PROCEDURE — G0300 HHS/HOSPICE OF LPN EA 15 MIN: HCPCS

## 2024-01-14 ENCOUNTER — HOME CARE VISIT (OUTPATIENT)
Dept: HOME HEALTH SERVICES | Facility: HOME HEALTHCARE | Age: 64
End: 2024-01-14
Payer: MEDICARE

## 2024-01-14 VITALS
RESPIRATION RATE: 18 BRPM | HEART RATE: 50 BPM | OXYGEN SATURATION: 92 % | TEMPERATURE: 97.3 F | DIASTOLIC BLOOD PRESSURE: 80 MMHG | SYSTOLIC BLOOD PRESSURE: 116 MMHG

## 2024-01-14 PROCEDURE — G0299 HHS/HOSPICE OF RN EA 15 MIN: HCPCS

## 2024-01-15 ENCOUNTER — HOME CARE VISIT (OUTPATIENT)
Dept: HOME HEALTH SERVICES | Facility: HOME HEALTHCARE | Age: 64
End: 2024-01-15
Payer: MEDICARE

## 2024-01-15 PROCEDURE — G0299 HHS/HOSPICE OF RN EA 15 MIN: HCPCS

## 2024-01-17 ENCOUNTER — APPOINTMENT (EMERGENCY)
Dept: RADIOLOGY | Facility: HOSPITAL | Age: 64
DRG: 629 | End: 2024-01-17
Payer: MEDICARE

## 2024-01-17 ENCOUNTER — HOSPITAL ENCOUNTER (INPATIENT)
Facility: HOSPITAL | Age: 64
LOS: 16 days | Discharge: HOME HEALTH | DRG: 629 | End: 2024-02-02
Attending: EMERGENCY MEDICINE | Admitting: THORACIC SURGERY (CARDIOTHORACIC VASCULAR SURGERY)
Payer: MEDICARE

## 2024-01-17 ENCOUNTER — HOME CARE VISIT (OUTPATIENT)
Dept: HOME HEALTH SERVICES | Facility: HOME HEALTHCARE | Age: 64
End: 2024-01-17
Payer: MEDICARE

## 2024-01-17 VITALS
OXYGEN SATURATION: 97 % | SYSTOLIC BLOOD PRESSURE: 110 MMHG | RESPIRATION RATE: 20 BRPM | TEMPERATURE: 97.6 F | HEART RATE: 78 BPM | DIASTOLIC BLOOD PRESSURE: 60 MMHG

## 2024-01-17 DIAGNOSIS — L89.324 STAGE IV PRESSURE ULCER OF LEFT BUTTOCK (HCC): ICD-10-CM

## 2024-01-17 DIAGNOSIS — G89.29 CHRONIC LOW BACK PAIN WITHOUT SCIATICA, UNSPECIFIED BACK PAIN LATERALITY: ICD-10-CM

## 2024-01-17 DIAGNOSIS — G82.20 PARAPLEGIC SPINAL PARALYSIS (HCC): ICD-10-CM

## 2024-01-17 DIAGNOSIS — M86.28 SUBACUTE OSTEOMYELITIS, OTHER SITE (HCC): ICD-10-CM

## 2024-01-17 DIAGNOSIS — K21.9 GASTROESOPHAGEAL REFLUX DISEASE: ICD-10-CM

## 2024-01-17 DIAGNOSIS — M54.50 CHRONIC LOW BACK PAIN WITHOUT SCIATICA, UNSPECIFIED BACK PAIN LATERALITY: ICD-10-CM

## 2024-01-17 DIAGNOSIS — M86.68 OTHER CHRONIC OSTEOMYELITIS, OTHER SITE (HCC): Primary | ICD-10-CM

## 2024-01-17 DIAGNOSIS — R07.81 RIB PAIN: ICD-10-CM

## 2024-01-17 DIAGNOSIS — F11.20 CONTINUOUS OPIOID DEPENDENCE (HCC): ICD-10-CM

## 2024-01-17 DIAGNOSIS — Z16.24 MULTIPLE DRUG RESISTANT ORGANISM (MDRO) CULTURE POSITIVE: ICD-10-CM

## 2024-01-17 LAB
ABO GROUP BLD: NORMAL
ALBUMIN SERPL BCP-MCNC: 3.2 G/DL (ref 3.5–5)
ALP SERPL-CCNC: 76 U/L (ref 34–104)
ALT SERPL W P-5'-P-CCNC: 7 U/L (ref 7–52)
ANION GAP SERPL CALCULATED.3IONS-SCNC: 7 MMOL/L
APTT PPP: 29 SECONDS (ref 23–37)
AST SERPL W P-5'-P-CCNC: 17 U/L (ref 13–39)
BASOPHILS # BLD AUTO: 0.07 THOUSANDS/ÂΜL (ref 0–0.1)
BASOPHILS NFR BLD AUTO: 1 % (ref 0–1)
BILIRUB SERPL-MCNC: 0.26 MG/DL (ref 0.2–1)
BLD GP AB SCN SERPL QL: NEGATIVE
BUN SERPL-MCNC: 13 MG/DL (ref 5–25)
CALCIUM ALBUM COR SERPL-MCNC: 9.6 MG/DL (ref 8.3–10.1)
CALCIUM SERPL-MCNC: 9 MG/DL (ref 8.4–10.2)
CHLORIDE SERPL-SCNC: 106 MMOL/L (ref 96–108)
CO2 SERPL-SCNC: 23 MMOL/L (ref 21–32)
CREAT SERPL-MCNC: 0.36 MG/DL (ref 0.6–1.3)
EOSINOPHIL # BLD AUTO: 0.43 THOUSAND/ÂΜL (ref 0–0.61)
EOSINOPHIL NFR BLD AUTO: 6 % (ref 0–6)
ERYTHROCYTE [DISTWIDTH] IN BLOOD BY AUTOMATED COUNT: 21.5 % (ref 11.6–15.1)
GFR SERPL CREATININE-BSD FRML MDRD: 132 ML/MIN/1.73SQ M
GLUCOSE SERPL-MCNC: 70 MG/DL (ref 65–140)
GLUCOSE SERPL-MCNC: 85 MG/DL (ref 65–140)
GLUCOSE SERPL-MCNC: 94 MG/DL (ref 65–140)
HCT VFR BLD AUTO: 35.5 % (ref 36.5–49.3)
HGB BLD-MCNC: 9.9 G/DL (ref 12–17)
IMM GRANULOCYTES # BLD AUTO: 0.03 THOUSAND/UL (ref 0–0.2)
IMM GRANULOCYTES NFR BLD AUTO: 0 % (ref 0–2)
INR PPP: 1.06 (ref 0.84–1.19)
LYMPHOCYTES # BLD AUTO: 1.84 THOUSANDS/ÂΜL (ref 0.6–4.47)
LYMPHOCYTES NFR BLD AUTO: 24 % (ref 14–44)
MCH RBC QN AUTO: 22.2 PG (ref 26.8–34.3)
MCHC RBC AUTO-ENTMCNC: 27.9 G/DL (ref 31.4–37.4)
MCV RBC AUTO: 80 FL (ref 82–98)
MONOCYTES # BLD AUTO: 0.35 THOUSAND/ÂΜL (ref 0.17–1.22)
MONOCYTES NFR BLD AUTO: 5 % (ref 4–12)
NEUTROPHILS # BLD AUTO: 5.06 THOUSANDS/ÂΜL (ref 1.85–7.62)
NEUTS SEG NFR BLD AUTO: 64 % (ref 43–75)
NRBC BLD AUTO-RTO: 0 /100 WBCS
PLATELET # BLD AUTO: 435 THOUSANDS/UL (ref 149–390)
PMV BLD AUTO: 9.7 FL (ref 8.9–12.7)
POTASSIUM SERPL-SCNC: 4.6 MMOL/L (ref 3.5–5.3)
PROT SERPL-MCNC: 8.8 G/DL (ref 6.4–8.4)
PROTHROMBIN TIME: 13.7 SECONDS (ref 11.6–14.5)
RBC # BLD AUTO: 4.45 MILLION/UL (ref 3.88–5.62)
RH BLD: POSITIVE
SODIUM SERPL-SCNC: 136 MMOL/L (ref 135–147)
SPECIMEN EXPIRATION DATE: NORMAL
WBC # BLD AUTO: 7.78 THOUSAND/UL (ref 4.31–10.16)

## 2024-01-17 PROCEDURE — 87040 BLOOD CULTURE FOR BACTERIA: CPT | Performed by: STUDENT IN AN ORGANIZED HEALTH CARE EDUCATION/TRAINING PROGRAM

## 2024-01-17 PROCEDURE — 87147 CULTURE TYPE IMMUNOLOGIC: CPT

## 2024-01-17 PROCEDURE — 85610 PROTHROMBIN TIME: CPT | Performed by: STUDENT IN AN ORGANIZED HEALTH CARE EDUCATION/TRAINING PROGRAM

## 2024-01-17 PROCEDURE — 87205 SMEAR GRAM STAIN: CPT

## 2024-01-17 PROCEDURE — NC001 PR NO CHARGE: Performed by: THORACIC SURGERY (CARDIOTHORACIC VASCULAR SURGERY)

## 2024-01-17 PROCEDURE — 93005 ELECTROCARDIOGRAM TRACING: CPT

## 2024-01-17 PROCEDURE — 86901 BLOOD TYPING SEROLOGIC RH(D): CPT | Performed by: STUDENT IN AN ORGANIZED HEALTH CARE EDUCATION/TRAINING PROGRAM

## 2024-01-17 PROCEDURE — 99284 EMERGENCY DEPT VISIT MOD MDM: CPT

## 2024-01-17 PROCEDURE — 85025 COMPLETE CBC W/AUTO DIFF WBC: CPT | Performed by: STUDENT IN AN ORGANIZED HEALTH CARE EDUCATION/TRAINING PROGRAM

## 2024-01-17 PROCEDURE — 80053 COMPREHEN METABOLIC PANEL: CPT | Performed by: STUDENT IN AN ORGANIZED HEALTH CARE EDUCATION/TRAINING PROGRAM

## 2024-01-17 PROCEDURE — 36415 COLL VENOUS BLD VENIPUNCTURE: CPT | Performed by: STUDENT IN AN ORGANIZED HEALTH CARE EDUCATION/TRAINING PROGRAM

## 2024-01-17 PROCEDURE — 86850 RBC ANTIBODY SCREEN: CPT | Performed by: STUDENT IN AN ORGANIZED HEALTH CARE EDUCATION/TRAINING PROGRAM

## 2024-01-17 PROCEDURE — 86900 BLOOD TYPING SEROLOGIC ABO: CPT | Performed by: STUDENT IN AN ORGANIZED HEALTH CARE EDUCATION/TRAINING PROGRAM

## 2024-01-17 PROCEDURE — 71045 X-RAY EXAM CHEST 1 VIEW: CPT

## 2024-01-17 PROCEDURE — 85730 THROMBOPLASTIN TIME PARTIAL: CPT | Performed by: STUDENT IN AN ORGANIZED HEALTH CARE EDUCATION/TRAINING PROGRAM

## 2024-01-17 PROCEDURE — 82948 REAGENT STRIP/BLOOD GLUCOSE: CPT

## 2024-01-17 PROCEDURE — 99285 EMERGENCY DEPT VISIT HI MDM: CPT | Performed by: EMERGENCY MEDICINE

## 2024-01-17 PROCEDURE — 87070 CULTURE OTHR SPECIMN AEROBIC: CPT

## 2024-01-17 RX ORDER — HYDROMORPHONE HCL/PF 1 MG/ML
0.5 SYRINGE (ML) INJECTION
Status: DISCONTINUED | OUTPATIENT
Start: 2024-01-17 | End: 2024-02-02 | Stop reason: HOSPADM

## 2024-01-17 RX ORDER — OMEPRAZOLE 40 MG/1
40 CAPSULE, DELAYED RELEASE ORAL DAILY
Qty: 90 CAPSULE | Refills: 1 | Status: ON HOLD | OUTPATIENT
Start: 2024-01-17

## 2024-01-17 RX ORDER — PANTOPRAZOLE SODIUM 40 MG/1
40 TABLET, DELAYED RELEASE ORAL
Status: DISCONTINUED | OUTPATIENT
Start: 2024-01-18 | End: 2024-02-02 | Stop reason: HOSPADM

## 2024-01-17 RX ORDER — OXYCODONE HYDROCHLORIDE 10 MG/1
10 TABLET ORAL EVERY 4 HOURS PRN
Status: DISCONTINUED | OUTPATIENT
Start: 2024-01-17 | End: 2024-02-02 | Stop reason: HOSPADM

## 2024-01-17 RX ORDER — OMEPRAZOLE 40 MG/1
40 CAPSULE, DELAYED RELEASE ORAL DAILY
Qty: 30 CAPSULE | Refills: 5 | Status: SHIPPED | OUTPATIENT
Start: 2024-01-17 | End: 2024-01-17

## 2024-01-17 RX ORDER — ONDANSETRON 2 MG/ML
4 INJECTION INTRAMUSCULAR; INTRAVENOUS EVERY 6 HOURS PRN
Status: DISCONTINUED | OUTPATIENT
Start: 2024-01-17 | End: 2024-02-02 | Stop reason: HOSPADM

## 2024-01-17 RX ORDER — CALCIUM CARBONATE 500 MG/1
500 TABLET, CHEWABLE ORAL 3 TIMES DAILY PRN
Status: DISCONTINUED | OUTPATIENT
Start: 2024-01-17 | End: 2024-02-02 | Stop reason: HOSPADM

## 2024-01-17 RX ORDER — HEPARIN SODIUM 5000 [USP'U]/ML
5000 INJECTION, SOLUTION INTRAVENOUS; SUBCUTANEOUS EVERY 8 HOURS SCHEDULED
Status: DISCONTINUED | OUTPATIENT
Start: 2024-01-17 | End: 2024-02-02 | Stop reason: HOSPADM

## 2024-01-17 RX ORDER — ASPIRIN 81 MG/1
81 TABLET ORAL DAILY
Qty: 90 TABLET | Refills: 0 | Status: ON HOLD | OUTPATIENT
Start: 2024-01-17

## 2024-01-17 RX ORDER — OXYCODONE HYDROCHLORIDE 15 MG/1
15 TABLET ORAL EVERY 6 HOURS PRN
Qty: 120 TABLET | Refills: 0 | Status: ON HOLD | OUTPATIENT
Start: 2024-01-17

## 2024-01-17 RX ORDER — ACETAMINOPHEN 325 MG/1
650 TABLET ORAL EVERY 6 HOURS PRN
Status: DISCONTINUED | OUTPATIENT
Start: 2024-01-17 | End: 2024-02-02 | Stop reason: HOSPADM

## 2024-01-17 RX ORDER — SACCHAROMYCES BOULARDII 250 MG
250 CAPSULE ORAL 2 TIMES DAILY
Status: DISCONTINUED | OUTPATIENT
Start: 2024-01-17 | End: 2024-02-02 | Stop reason: HOSPADM

## 2024-01-17 RX ORDER — IBUPROFEN 800 MG/1
800 TABLET ORAL EVERY 8 HOURS PRN
Qty: 60 TABLET | Refills: 0 | Status: ON HOLD | OUTPATIENT
Start: 2024-01-17

## 2024-01-17 RX ORDER — OXYCODONE HYDROCHLORIDE 5 MG/1
5 TABLET ORAL EVERY 4 HOURS PRN
Status: DISCONTINUED | OUTPATIENT
Start: 2024-01-17 | End: 2024-02-02 | Stop reason: HOSPADM

## 2024-01-17 RX ORDER — INSULIN LISPRO 100 [IU]/ML
1-6 INJECTION, SOLUTION INTRAVENOUS; SUBCUTANEOUS
Status: DISCONTINUED | OUTPATIENT
Start: 2024-01-17 | End: 2024-01-22

## 2024-01-17 RX ADMIN — OXYCODONE HYDROCHLORIDE 10 MG: 10 TABLET ORAL at 20:22

## 2024-01-17 RX ADMIN — OXYCODONE HYDROCHLORIDE 15 MG: 10 TABLET ORAL at 14:32

## 2024-01-17 RX ADMIN — HEPARIN SODIUM 5000 UNITS: 5000 INJECTION INTRAVENOUS; SUBCUTANEOUS at 17:48

## 2024-01-17 RX ADMIN — HEPARIN SODIUM 5000 UNITS: 5000 INJECTION INTRAVENOUS; SUBCUTANEOUS at 21:26

## 2024-01-17 NOTE — ED PROVIDER NOTES
History  Chief Complaint   Patient presents with    Wound Check     Per pt he was sent here by wound care doctor for possible infection on wound. He has a wound on the right side for about 2 yrs.      64-year-old Male with past Medical History Listed below, presented for evaluation of right 6-8 rib osteomyelitis.  Patient states he was advised to come to the emergency department on 12/22/23 for evaluation of the right rib osteo with pathological fractures by his general surgeon Dr Yan.  Patient refused admission at the time, decided to come in today.  Patient has known MDRO infection.  In his note, his surgeon recommends evaluation by infectious disease, plastic surgery, thoracic surgery.  Patient denies fever/chills, SOB, chest pain, N/V. Complains of R rib pain.         Prior to Admission Medications   Prescriptions Last Dose Informant Patient Reported? Taking?   Accu-Chek FastClix Lancets MISC   No No   Sig: Inject under the skin daily   Blood Glucose Monitoring Suppl (ONE TOUCH ULTRA 2) w/Device KIT   No No   Sig: Use daily   Disposable Gloves (LATEX GLOVES LARGE) MISC  Self No No   Sig: Use as needed up to 3 times a day dispo 2 boxes   Incontinence Supply Disposable (SUNBEAM INCONTINENT UNDER PAD) MISC  Self No No   Sig: Use 1 per week, dispense 4 reusable underpads   Incontinence Supply Disposable MISC  Self No No   Sig: by Does not apply route 2 (two) times a day   Nutritional Supplements (Glucerna Shake) LIQD   No No   Sig: Take 3 Cans by mouth 3 (three) times a day   acetaminophen (TYLENOL) 325 mg tablet  Self No No   Sig: Take 2 tablets (650 mg total) by mouth every 6 (six) hours as needed for mild pain, headaches or fever   Patient not taking: Reported on 9/2/2022   aspirin (RA Aspirin EC) 81 mg EC tablet   No No   Sig: Take 1 tablet (81 mg total) by mouth daily   ergocalciferol (VITAMIN D2) 50,000 units   No No   Sig: Take 1 capsule (50,000 Units total) by mouth once a week   Patient not taking:  Reported on 9/2/2022   glucose blood (Accu-Chek Guide) test strip   No No   Sig: Use 1 each daily Use as instructed   ibuprofen (MOTRIN) 800 mg tablet   No No   Sig: Take 1 tablet (800 mg total) by mouth every 8 (eight) hours as needed for mild pain   naloxone (NARCAN) 4 mg/0.1 mL nasal spray   No No   Sig: Administer 1 spray into a nostril. If no response after 2-3 minutes, give another dose in the other nostril using a new spray.   omeprazole (PriLOSEC) 40 MG capsule   No No   Sig: TAKE 1 CAPSULE(40 MG) BY MOUTH DAILY   oxyCODONE (Roxicodone) 15 mg immediate release tablet   No No   Sig: Take 1 tablet (15 mg total) by mouth every 6 (six) hours as needed for moderate pain Max Daily Amount: 60 mg   saccharomyces boulardii (FLORASTOR) 250 mg capsule   No No   Sig: Take 1 capsule (250 mg total) by mouth 2 (two) times a day   sodium hypochlorite (DAKIN'S QUARTER-STRENGTH)   No No   Sig: Apply 1 Application topically daily      Facility-Administered Medications: None       Past Medical History:   Diagnosis Date    Anemia     Blind     r eye    Chronic cystitis     Colostomy in place ([facility])     Detrusor sphincter dyssynergia     Diabetes mellitus ([facility])     Poorly controlled type 2; Last Assessed:  3/18/14    Erectile dysfunction     Frequency of urination     GERD (gastroesophageal reflux disease)     History of diabetes mellitus     History of osteomyelitis     Hx of leg amputation ([facility])     r high upper leg    Hyperlipidemia     Hypertension     Hypospadias     Incomplete bladder emptying     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(HCC) 9/16/2019    Neurogenic bladder     Paralysis (HCC)     Paraplegia (HCC)     Spinal cord cysts     Ulcer of sacral region ([facility])     Urge incontinence        Past Surgical History:   Procedure Laterality Date    AMPUTATION      At hip; Last Assessed:  1/19/16    BLADDER SURGERY      COLON SURGERY      llq ostomy pouch    COLOSTOMY      COMPLEX CYSTOMETROGRAM  2014    CT CYSTOGRAM  9/19/2019     "CYSTOSCOPY      FL CYSTOGRAM  2015    FL CYSTOGRAM  2014    FL CYSTOGRAM  10/24/2014    IR PICC REPOSITION  2019    IR SUPRAPUBIC CATHETER PLACEMENT  2019    LEG AMPUTATION      MEATOTOMY      PENILE PROSTHESIS  REMOVAL N/A 2019    Procedure: REMOVAL PROSTHESIS PENILE;  Surgeon: Cuong Phillips MD;  [facility];  Service: Urology    PENILE PROSTHESIS IMPLANT      NM ADJT/REARRGMT SCALP/ARM/LEG 10.1-30.0 SQ CM Left 2017    Procedure: POSTERIOR THIGH V-Y ADMANCEMENT;  Surgeon: Gal Cardozo MD;  [facility];  Service: Plastics    NM MUSC MYOCUTANEOUS/FASCIOCUTANEOUS FLAP TRUNK Left 2017    Procedure: FLAP CLOSURE LEFT ISCHIAL WOUND and \"RIGHT\" ISCHIAL FLAP ADVANCEMENT * DEBRIDEMENT, VAC PLACEMENT ;  Surgeon: Gal Cardozo MD;  [facility];  Service: Plastics    NM MUSC MYOCUTANEOUS/FASCIOCUTANEOUS FLAP TRUNK Left 2017    Procedure: gluteal myocutaneous rotational flap, posterior thigh v to y advancement- wound 5 x 2.5 x 8;  Surgeon: Gal Cardozo MD;  [facility];  Service: Plastics    SPINE SURGERY      Lower back    UROFLOWMETRY SIMPLE / COMPLEX         Family History   Problem Relation Age of Onset    No Known Problems Mother     No Known Problems Father      I have reviewed and agree with the history as documented.    E-Cigarette/Vaping    E-Cigarette Use Never User      E-Cigarette/Vaping Substances    Nicotine No     THC No     CBD No     Flavoring No     Other No     Unknown No      Social History     Tobacco Use    Smoking status: Former     Current packs/day: 0.00     Average packs/day: 0.5 packs/day for 10.0 years (5.0 ttl pk-yrs)     Types: Cigarettes     Start date:      Quit date:      Years since quittin.0    Smokeless tobacco: Never    Tobacco comments:     Onset date:  11/10/17   Vaping Use    Vaping status: Never Used   Substance Use Topics    Alcohol use: Yes     Comment: Per [employer]:  Social drinker " (Onset date:  11/10/17)    Drug use: No        Review of Systems    Physical Exam  ED Triage Vitals [01/17/24 1031]   Temperature Pulse Respirations Blood Pressure SpO2   97.8 °F (36.6 °C) 70 18 (!) 140/49 98 %      Temp Source Heart Rate Source Patient Position - Orthostatic VS BP Location FiO2 (%)   Oral Monitor Sitting Left arm --      Pain Score       8             Orthostatic Vital Signs  Vitals:    01/17/24 1031 01/17/24 1400 01/17/24 1530   BP: (!) 140/49     Pulse: 70 (!) 54 (!) 54   Patient Position - Orthostatic VS: Sitting Lying        Physical Exam  Vitals reviewed.   Constitutional:       General: He is not in acute distress.  HENT:      Head: Normocephalic and atraumatic.   Cardiovascular:      Rate and Rhythm: Normal rate.      Pulses: Normal pulses.   Pulmonary:      Effort: Pulmonary effort is normal.   Chest:      Comments: Bandage on R chest wall, c/d/I.   Skin:     General: Skin is warm and dry.      Capillary Refill: Capillary refill takes less than 2 seconds.   Neurological:      General: No focal deficit present.      Mental Status: He is alert and oriented to person, place, and time.         ED Medications  Medications   oxyCODONE (ROXICODONE) IR tablet 15 mg (15 mg Oral Given 1/17/24 1432)       Diagnostic Studies  Results Reviewed       Procedure Component Value Units Date/Time    Comprehensive metabolic panel [[id number]]  (Abnormal) Collected: 01/17/24 1147    Lab Status: Final result Specimen: Blood from Arm, Right Updated: 01/17/24 1219     Sodium 136 mmol/L      Potassium 4.6 mmol/L      Chloride 106 mmol/L      CO2 23 mmol/L      ANION GAP 7 mmol/L      BUN 13 mg/dL      Creatinine 0.36 mg/dL      Glucose 70 mg/dL      Calcium 9.0 mg/dL      Corrected Calcium 9.6 mg/dL      AST 17 U/L      ALT 7 U/L      Alkaline Phosphatase 76 U/L      Total Protein 8.8 g/dL      Albumin 3.2 g/dL      Total Bilirubin 0.26 mg/dL      eGFR 132 ml/min/1.73sq m     Narrative:      National Kidney Disease  Foundation guidelines for Chronic Kidney Disease (CKD):     Stage 1 with normal or high GFR (GFR > 90 mL/min/1.73 square meters)    Stage 2 Mild CKD (GFR = 60-89 mL/min/1.73 square meters)    Stage 3A Moderate CKD (GFR = 45-59 mL/min/1.73 square meters)    Stage 3B Moderate CKD (GFR = 30-44 mL/min/1.73 square meters)    Stage 4 Severe CKD (GFR = 15-29 mL/min/1.73 square meters)    Stage 5 End Stage CKD (GFR <15 mL/min/1.73 square meters)  Note: GFR calculation is accurate only with a steady state creatinine    Protime-INR [[id number]]  (Normal) Collected: 01/17/24 1147    Lab Status: Final result Specimen: Blood from Arm, Right Updated: 01/17/24 1211     Protime 13.7 seconds      INR 1.06    APTT [[id number]]  (Normal) Collected: 01/17/24 1147    Lab Status: Final result Specimen: Blood from Arm, Right Updated: 01/17/24 1211     PTT 29 seconds     CBC and differential [[id number]]  (Abnormal) Collected: 01/17/24 1147    Lab Status: Final result Specimen: Blood from Arm, Right Updated: 01/17/24 1159     WBC 7.78 Thousand/uL      RBC 4.45 Million/uL      Hemoglobin 9.9 g/dL      Hematocrit 35.5 %      MCV 80 fL      MCH 22.2 pg      MCHC 27.9 g/dL      RDW 21.5 %      MPV 9.7 fL      Platelets 435 Thousands/uL      nRBC 0 /100 WBCs      Neutrophils Relative 64 %      Immat GRANS % 0 %      Lymphocytes Relative 24 %      Monocytes Relative 5 %      Eosinophils Relative 6 %      Basophils Relative 1 %      Neutrophils Absolute 5.06 Thousands/µL      Immature Grans Absolute 0.03 Thousand/uL      Lymphocytes Absolute 1.84 Thousands/µL      Monocytes Absolute 0.35 Thousand/µL      Eosinophils Absolute 0.43 Thousand/µL      Basophils Absolute 0.07 Thousands/µL     Blood culture [[id number]] Collected: 01/17/24 1147    Lab Status: In process Specimen: Blood from Arm, Right Updated: 01/17/24 1155    Blood culture [[id number]]     Lab Status: No result Specimen: Blood from Arm, Right                    XR chest portable ICU   Final  Result by Lupillo Jesus MD (01/17 1423)      No acute cardiopulmonary disease.                  Resident: JENNYFER PRATER I, the attending radiologist, have reviewed the images and agree with the final report above.      Workstation performed: [id number]               Procedures  Procedures      ED Course  ED Course as of 01/17/24 1643   Wed Jan 17, 2024   1131 Contacted [facility] surgery about admitting/recs. Team in the OR, will reach out when available.    1447 Spoke with surgery attending, pt is appopriate for thoracic surgery. [facility] consulted      1450 Thoracic recommends medicine admit, FM contacted about admit. FM recommends surgery admit.    1633 Thoracic surgery will admit                                       Medical Decision Making  Know R rib osteo, will reach out to surgery about admit. Labs/cultures/pain management.     Thoracic surgery will admit, pt aware     Amount and/or Complexity of Data Reviewed  External Data Reviewed: labs, radiology, ECG and notes.  Labs: ordered.  Radiology: ordered.          Disposition  Final diagnoses:   Other chronic osteomyelitis, other site (HCC)   Multiple drug resistant organism (MDRO) culture positive   Rib pain     Time reflects when diagnosis was documented in both MDM as applicable and the Disposition within this note       Time User Action Codes Description Comment    1/17/2024  4:34 PM Erich Franklin Add [M86.68] Other chronic osteomyelitis, other site (HCC)     1/17/2024  4:35 PM Erich Franklin Add [Z16.24] Multiple drug resistant organism (MDRO) culture positive     1/17/2024  4:35 PM Erich Franklin Add [R07.81] Rib pain           ED Disposition       ED Disposition   Admit    Condition   Stable    Date/Time   Wed Jan 17, 2024  4:34 PM    Comment   Case was discussed with [facility] surgery resident and the patient's admission status was agreed to be Admission Status: inpatient status to the service of Dr. Maradiaga .               Follow-up Information     None         Patient's Medications   Discharge Prescriptions    No medications on file     No discharge procedures on file.    PDMP Review         Value Time User    PDMP Reviewed  Yes 12/20/2023  8:17 AM KALPANA Philip             ED Provider  Attending physically available and evaluated Rikki Arguello. I managed the patient along with the ED Attending.    Electronically Signed by           Erich Franklin MD  01/17/24 4817

## 2024-01-17 NOTE — H&P
Consultation - Thoracic Surgery  Rikki Arguello 64 y.o. male MRN: [id number]  Unit/Bed#: [facility] Encounter: [id number]        Assessment/Plan     Assessment:  63 yo male presents with right chest wall wound concerning for osteomyelitis on most recent CT scan. Will most likely need rib resection and chest wall reconstruction.    Plan:  Admit to thoracic surgery service  Patient can have a diet  Will consult ID for assessment of whether antibiotics are warranted at this time  At this point in time will hold off on antibiotics given he is afebrile without a white count  Will consult plastic surgery to discuss reconstructive options  Consult to wound care for management of sacral wound  PRN pain meds  DVT prophylaxis    History of Present Illness     HPI:  Rikki Arguello is a 64 y.o. male who presents with a right chest wall wound. The wound has been managed by Dr. Yan in the outpatient setting. Of note, the patient has had paralysis for a long period of time and his right chest wall wound is 2/2 the way his chest wall is in contact with his wheelchair. On 12/14 the patient had a CT scan of the chest with contrast which demonstrated: right chest wall wound with concern for osteomyelitis of the right 6/7/8 ribs along with a pathologic fracture. PMHX includes chronic cystitis, colostomy, DM, GERD, Right lower extremity amputation, HLD, HTN, paralysis, sacral wound. Patient presented to the ED afebrile with stable vitals. No I and Os recorded. WBC is 7.7. Hgb is 9.9. Cr is 0.36. Blood cultures were obtained and are pending. Wound culture also obtained. See above for assessment and plan.     Review of Systems  See above, otherwise negative.    Historical Information   Past Medical History:   Diagnosis Date    Anemia     Blind     r eye    Chronic cystitis     Colostomy in place (HCC)     Detrusor sphincter dyssynergia     Diabetes mellitus (HCC)     Poorly controlled type 2; Last Assessed:  3/18/14    Erectile  "dysfunction     Frequency of urination     GERD (gastroesophageal reflux disease)     History of diabetes mellitus     History of osteomyelitis     Hx of leg amputation (HCC)     r high upper leg    Hyperlipidemia     Hypertension     Hypospadias     Incomplete bladder emptying     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(HCC) 9/16/2019    Neurogenic bladder     Paralysis (HCC)     Paraplegia (HCC)     Spinal cord cysts     Ulcer of sacral region (HCC)     Urge incontinence      Past Surgical History:   Procedure Laterality Date    AMPUTATION      At hip; Last Assessed:  1/19/16    BLADDER SURGERY      COLON SURGERY      llq ostomy pouch    COLOSTOMY      COMPLEX CYSTOMETROGRAM  2014    CT CYSTOGRAM  9/19/2019    CYSTOSCOPY  2014    FL CYSTOGRAM  1/5/2015    FL CYSTOGRAM  11/4/2014    FL CYSTOGRAM  10/24/2014    IR PICC REPOSITION  9/23/2019    IR SUPRAPUBIC CATHETER PLACEMENT  9/19/2019    LEG AMPUTATION      MEATOTOMY      PENILE PROSTHESIS  REMOVAL N/A 11/1/2019    Procedure: REMOVAL PROSTHESIS PENILE;  Surgeon: Cuong Phillips MD;  [facility];  Service: Urology    PENILE PROSTHESIS IMPLANT  2011    AL ADJT/REARRGMT SCALP/ARM/LEG 10.1-30.0 SQ CM Left 5/1/2017    Procedure: POSTERIOR THIGH V-Y ADMANCEMENT;  Surgeon: Gal Cardozo MD;  [facility];  Service: Plastics    AL MUSC MYOCUTANEOUS/FASCIOCUTANEOUS FLAP TRUNK Left 5/1/2017    Procedure: FLAP CLOSURE LEFT ISCHIAL WOUND and \"RIGHT\" ISCHIAL FLAP ADVANCEMENT * DEBRIDEMENT, VAC PLACEMENT ;  Surgeon: Gal Cardozo MD;  [facility];  Service: Plastics    AL MUSC MYOCUTANEOUS/FASCIOCUTANEOUS FLAP TRUNK Left 9/27/2017    Procedure: gluteal myocutaneous rotational flap, posterior thigh v to y advancement- wound 5 x 2.5 x 8;  Surgeon: Gal Cardozo MD;  [facility];  Service: Plastics    SPINE SURGERY      Lower back    UROFLOWMETRY SIMPLE / COMPLEX  2014     Social History   Social History     Substance and Sexual Activity   Alcohol Use Yes    " Comment: Per [employer]:  Social drinker (Onset date:  11/10/17)     Social History     Substance and Sexual Activity   Drug Use No     Social History     Tobacco Use   Smoking Status Former    Current packs/day: 0.00    Average packs/day: 0.5 packs/day for 10.0 years (5.0 ttl pk-yrs)    Types: Cigarettes    Start date:     Quit date:     Years since quittin.0   Smokeless Tobacco Never   Tobacco Comments    Onset date:  11/10/17     Family History: non-contributory    Meds/Allergies   all medications and allergies reviewed  No Known Allergies    Objective   First Vitals:   Blood Pressure: (!) 140/49 (24 1031)  Pulse: 70 (24 1031)  Temperature: 97.8 °F (36.6 °C) (24 1031)  Temp Source: Oral (24 1031)  Respirations: 18 (24 1031)  SpO2: 98 % (24 1031)    Current Vitals:   Blood Pressure: 145/80 (24 1700)  Pulse: (!) 54 (24 1700)  Temperature: 97.8 °F (36.6 °C) (24 1031)  Temp Source: Oral (24 1031)  Respirations: 19 (24 1700)  SpO2: 97 % (24 1700)    No intake or output data in the 24 hours ending 24 1741    Invasive Devices       Peripheral Intravenous Line  Duration             Peripheral IV 24 Right Antecubital <1 day                    Physical Exam:  General: No acute distress. Uses motorized wheelchair to ambulate.  Neuro: Alert, oriented to person and place  Eyes: Extraocular movements intact  CV: Well perfused, regular rate and rhythm  Chest: Right chest wall wound. Some serous drainage appreciated. No bone exposed. Nontender to palpation.   Lungs: Normal work of breathing, no increased respiratory effort  Abdomen: Soft, non-tender, non-distended.   Extremities: No edema, clubbing or cyanosis  Skin: Warm, dry  Lymph: No palpable lymph nodes  Psych: Normal mentation      Lab Results: I have personally reviewed pertinent lab results.    Imaging: I have personally reviewed pertinent reports.    EKG, Pathology, and  Other Studies: I have personally reviewed pertinent reports.      Code Status: Level 1 - Full Code  Advance Directive and Living Will:      Power of :    POLST:      Daniel Ewing MD  General Surgery Resident

## 2024-01-17 NOTE — ED ATTENDING ATTESTATION
1/17/2024  I, Rick Louise MD, saw and evaluated the patient. I have discussed the patient with the resident/non-physician practitioner and agree with the resident's/non-physician practitioner's findings, Plan of Care, and MDM as documented in the resident's/non-physician practitioner's note, except where noted. All available labs and Radiology studies were reviewed.  I was present for key portions of any procedure(s) performed by the resident/non-physician practitioner and I was immediately available to provide assistance.       At this point I agree with the current assessment done in the Emergency Department.  I have conducted an independent evaluation of this patient a history and physical is as follows:    64-year-old man presenting at the behest of his wound care doctor for ongoing right-sided wound with concern for osteomyelitis of the ribs.  Patient had initially been told to come in here in December but did not want to come in right away due to the holidays.  He has not had any particular change in his health and does not have any acute complaints.  Heart regular rate and rhythm, no murmurs rubs or gallops.  Lungs clear to auscultation bilaterally.  There is a chronic appearing wound to the right chest.  Will get labs, consult surgery, plan admission.    ED Course         Critical Care Time  Procedures

## 2024-01-18 ENCOUNTER — HOME HEALTH ADMISSION (OUTPATIENT)
Dept: HOME HEALTH SERVICES | Facility: HOME HEALTHCARE | Age: 64
End: 2024-01-18
Payer: MEDICARE

## 2024-01-18 ENCOUNTER — HOME HEALTH ADMISSION (OUTPATIENT)
Dept: HOME HEALTH SERVICES | Facility: HOME HEALTHCARE | Age: 64
End: 2024-01-18

## 2024-01-18 ENCOUNTER — HOME CARE VISIT (OUTPATIENT)
Dept: HOME HEALTH SERVICES | Facility: HOME HEALTHCARE | Age: 64
End: 2024-01-18
Payer: MEDICARE

## 2024-01-18 LAB
ALBUMIN SERPL BCP-MCNC: 3.2 G/DL (ref 3.5–5)
ALP SERPL-CCNC: 77 U/L (ref 34–104)
ALT SERPL W P-5'-P-CCNC: 7 U/L (ref 7–52)
ANION GAP SERPL CALCULATED.3IONS-SCNC: 5 MMOL/L
AST SERPL W P-5'-P-CCNC: 11 U/L (ref 13–39)
ATRIAL RATE: 108 BPM
ATRIAL RATE: 56 BPM
BASOPHILS # BLD AUTO: 0.05 THOUSANDS/ÂΜL (ref 0–0.1)
BASOPHILS NFR BLD AUTO: 1 % (ref 0–1)
BILIRUB SERPL-MCNC: 0.39 MG/DL (ref 0.2–1)
BUN SERPL-MCNC: 19 MG/DL (ref 5–25)
CALCIUM ALBUM COR SERPL-MCNC: 9.5 MG/DL (ref 8.3–10.1)
CALCIUM SERPL-MCNC: 8.9 MG/DL (ref 8.4–10.2)
CHLORIDE SERPL-SCNC: 100 MMOL/L (ref 96–108)
CO2 SERPL-SCNC: 26 MMOL/L (ref 21–32)
CREAT SERPL-MCNC: 0.44 MG/DL (ref 0.6–1.3)
EOSINOPHIL # BLD AUTO: 0.13 THOUSAND/ÂΜL (ref 0–0.61)
EOSINOPHIL NFR BLD AUTO: 2 % (ref 0–6)
ERYTHROCYTE [DISTWIDTH] IN BLOOD BY AUTOMATED COUNT: 21.4 % (ref 11.6–15.1)
GFR SERPL CREATININE-BSD FRML MDRD: 121 ML/MIN/1.73SQ M
GLUCOSE SERPL-MCNC: 74 MG/DL (ref 65–140)
GLUCOSE SERPL-MCNC: 82 MG/DL (ref 65–140)
GLUCOSE SERPL-MCNC: 97 MG/DL (ref 65–140)
HCT VFR BLD AUTO: 40.8 % (ref 36.5–49.3)
HGB BLD-MCNC: 11 G/DL (ref 12–17)
IMM GRANULOCYTES # BLD AUTO: 0.03 THOUSAND/UL (ref 0–0.2)
IMM GRANULOCYTES NFR BLD AUTO: 1 % (ref 0–2)
LACTATE SERPL-SCNC: 0.8 MMOL/L (ref 0.5–2)
LYMPHOCYTES # BLD AUTO: 0.67 THOUSANDS/ÂΜL (ref 0.6–4.47)
LYMPHOCYTES NFR BLD AUTO: 11 % (ref 14–44)
MAGNESIUM SERPL-MCNC: 1.7 MG/DL (ref 1.9–2.7)
MCH RBC QN AUTO: 22.5 PG (ref 26.8–34.3)
MCHC RBC AUTO-ENTMCNC: 27 G/DL (ref 31.4–37.4)
MCV RBC AUTO: 83 FL (ref 82–98)
MONOCYTES # BLD AUTO: 0.22 THOUSAND/ÂΜL (ref 0.17–1.22)
MONOCYTES NFR BLD AUTO: 4 % (ref 4–12)
NEUTROPHILS # BLD AUTO: 5.19 THOUSANDS/ÂΜL (ref 1.85–7.62)
NEUTS SEG NFR BLD AUTO: 81 % (ref 43–75)
NRBC BLD AUTO-RTO: 0 /100 WBCS
P AXIS: 61 DEGREES
P AXIS: 62 DEGREES
PHOSPHATE SERPL-MCNC: 3.2 MG/DL (ref 2.3–4.1)
PLATELET # BLD AUTO: 386 THOUSANDS/UL (ref 149–390)
PMV BLD AUTO: 9.9 FL (ref 8.9–12.7)
POTASSIUM SERPL-SCNC: 3.6 MMOL/L (ref 3.5–5.3)
PR INTERVAL: 152 MS
PR INTERVAL: 160 MS
PROT SERPL-MCNC: 8.6 G/DL (ref 6.4–8.4)
QRS AXIS: 48 DEGREES
QRS AXIS: 69 DEGREES
QRSD INTERVAL: 78 MS
QRSD INTERVAL: 86 MS
QT INTERVAL: 324 MS
QT INTERVAL: 414 MS
QTC INTERVAL: 399 MS
QTC INTERVAL: 434 MS
RBC # BLD AUTO: 4.89 MILLION/UL (ref 3.88–5.62)
SODIUM SERPL-SCNC: 131 MMOL/L (ref 135–147)
T WAVE AXIS: 56 DEGREES
T WAVE AXIS: 62 DEGREES
VENTRICULAR RATE: 108 BPM
VENTRICULAR RATE: 56 BPM
WBC # BLD AUTO: 6.29 THOUSAND/UL (ref 4.31–10.16)

## 2024-01-18 PROCEDURE — 84100 ASSAY OF PHOSPHORUS: CPT

## 2024-01-18 PROCEDURE — 82948 REAGENT STRIP/BLOOD GLUCOSE: CPT

## 2024-01-18 PROCEDURE — 80053 COMPREHEN METABOLIC PANEL: CPT

## 2024-01-18 PROCEDURE — 85025 COMPLETE CBC W/AUTO DIFF WBC: CPT

## 2024-01-18 PROCEDURE — 99223 1ST HOSP IP/OBS HIGH 75: CPT | Performed by: THORACIC SURGERY (CARDIOTHORACIC VASCULAR SURGERY)

## 2024-01-18 PROCEDURE — 93010 ELECTROCARDIOGRAM REPORT: CPT | Performed by: INTERNAL MEDICINE

## 2024-01-18 PROCEDURE — 87040 BLOOD CULTURE FOR BACTERIA: CPT | Performed by: SURGERY

## 2024-01-18 PROCEDURE — 99222 1ST HOSP IP/OBS MODERATE 55: CPT | Performed by: INTERNAL MEDICINE

## 2024-01-18 PROCEDURE — 83605 ASSAY OF LACTIC ACID: CPT | Performed by: SURGERY

## 2024-01-18 PROCEDURE — 87040 BLOOD CULTURE FOR BACTERIA: CPT | Performed by: STUDENT IN AN ORGANIZED HEALTH CARE EDUCATION/TRAINING PROGRAM

## 2024-01-18 PROCEDURE — 83735 ASSAY OF MAGNESIUM: CPT

## 2024-01-18 RX ORDER — SODIUM CHLORIDE, SODIUM GLUCONATE, SODIUM ACETATE, POTASSIUM CHLORIDE, MAGNESIUM CHLORIDE, SODIUM PHOSPHATE, DIBASIC, AND POTASSIUM PHOSPHATE .53; .5; .37; .037; .03; .012; .00082 G/100ML; G/100ML; G/100ML; G/100ML; G/100ML; G/100ML; G/100ML
1000 INJECTION, SOLUTION INTRAVENOUS ONCE
Status: COMPLETED | OUTPATIENT
Start: 2024-01-18 | End: 2024-01-18

## 2024-01-18 RX ORDER — MAGNESIUM SULFATE HEPTAHYDRATE 40 MG/ML
2 INJECTION, SOLUTION INTRAVENOUS ONCE
Status: COMPLETED | OUTPATIENT
Start: 2024-01-18 | End: 2024-01-18

## 2024-01-18 RX ORDER — POTASSIUM CHLORIDE 20 MEQ/1
40 TABLET, EXTENDED RELEASE ORAL ONCE
Status: COMPLETED | OUTPATIENT
Start: 2024-01-18 | End: 2024-01-18

## 2024-01-18 RX ADMIN — OXYCODONE HYDROCHLORIDE 10 MG: 10 TABLET ORAL at 18:05

## 2024-01-18 RX ADMIN — MAGNESIUM SULFATE HEPTAHYDRATE 2 G: 40 INJECTION, SOLUTION INTRAVENOUS at 16:00

## 2024-01-18 RX ADMIN — ACETAMINOPHEN 650 MG: 325 TABLET, FILM COATED ORAL at 22:15

## 2024-01-18 RX ADMIN — Medication 250 MG: at 08:23

## 2024-01-18 RX ADMIN — ASPIRIN 81 MG: 81 TABLET, COATED ORAL at 08:23

## 2024-01-18 RX ADMIN — OXYCODONE HYDROCHLORIDE 10 MG: 10 TABLET ORAL at 00:10

## 2024-01-18 RX ADMIN — HEPARIN SODIUM 5000 UNITS: 5000 INJECTION INTRAVENOUS; SUBCUTANEOUS at 22:08

## 2024-01-18 RX ADMIN — SODIUM CHLORIDE, SODIUM GLUCONATE, SODIUM ACETATE, POTASSIUM CHLORIDE, MAGNESIUM CHLORIDE, SODIUM PHOSPHATE, DIBASIC, AND POTASSIUM PHOSPHATE 1000 ML: .53; .5; .37; .037; .03; .012; .00082 INJECTION, SOLUTION INTRAVENOUS at 22:44

## 2024-01-18 RX ADMIN — OXYCODONE HYDROCHLORIDE 10 MG: 10 TABLET ORAL at 12:17

## 2024-01-18 RX ADMIN — POTASSIUM CHLORIDE 40 MEQ: 1500 TABLET, EXTENDED RELEASE ORAL at 16:00

## 2024-01-18 RX ADMIN — HEPARIN SODIUM 5000 UNITS: 5000 INJECTION INTRAVENOUS; SUBCUTANEOUS at 05:46

## 2024-01-18 RX ADMIN — OXYCODONE HYDROCHLORIDE 10 MG: 10 TABLET ORAL at 05:55

## 2024-01-18 RX ADMIN — HEPARIN SODIUM 5000 UNITS: 5000 INJECTION INTRAVENOUS; SUBCUTANEOUS at 13:50

## 2024-01-18 RX ADMIN — Medication 250 MG: at 18:04

## 2024-01-18 RX ADMIN — CALCIUM CARBONATE (ANTACID) CHEW TAB 500 MG 500 MG: 500 CHEW TAB at 03:37

## 2024-01-18 RX ADMIN — HYDROMORPHONE HYDROCHLORIDE 0.5 MG: 1 INJECTION, SOLUTION INTRAMUSCULAR; INTRAVENOUS; SUBCUTANEOUS at 20:45

## 2024-01-18 RX ADMIN — PANTOPRAZOLE SODIUM 40 MG: 40 TABLET, DELAYED RELEASE ORAL at 05:46

## 2024-01-18 NOTE — PROGRESS NOTES
Patient:    MRN:  [id number]    Jenifer Request ID:  [id number]    Level of care reserved:  [facility]    Partner Reserved:  [facility], [place] [address] [phone]    Clinical needs requested:    Geography searched:  [phone]    Start of Service:    Request sent:  12:33pm EST on 1/18/2024 by Farida Hawkins    Partner reserved:  2:07pm EST on 1/18/2024 by Farida Hawkins    Choice list shared:  2:07pm EST on 1/18/2024 by Farida Hawkins

## 2024-01-18 NOTE — CASE MANAGEMENT
Case Management Assessment & Discharge Planning Note    Patient name Rikki Arguello  Location [facility] 823/[facility] 823-01 MRN [id number]  : 1960 Date 2024       Current Admission Date: 2024  Current Admission Diagnosis:Rib pain, Multiple drug resistant organism (MDRO) culture positive, Other chronic osteomyelitis, other site ([facility])   Patient Active Problem List    Diagnosis Date Noted    Subacute osteomyelitis, other site ([facility]) 2023    Foul smelling urine 2023    Pressure ulcer of left buttock, stage 4 ([facility]) 01/10/2023    Heme positive stool 2022    Open wound of buttock, left, initial encounter 2022    Pressure injury of contiguous region involving back and left buttock, stage 4 ([facility]) 2021    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 ([facility]) 2021    Stage IV pressure ulcer of right lower back ([facility]) 2021    Type 2 diabetes mellitus without complication, without long-term current use of insulin ([facility]) 2021    Renal cyst 2019    Other male erectile dysfunction 2019    Prostate cancer screening 2019    Hypokalemia 10/29/2019    SBO (small bowel obstruction) ([facility]) 10/28/2019    Sepsis ([facility]) 10/28/2019    Stage II pressure ulcer of buttock ([facility]) 2019    Left perineal ischial pressure ulcer, stage IV ([facility]) 2019    Hyperkalemia 2019    History of Clostridium difficile colitis 2019    Neurogenic bladder 2019    Sepsis with hypotension  2019    Osteomyelitis of pelvic region ([facility]) 2019    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    Colostomy in place ([facility]) 2019    Colon cancer screening 2019    Dyspepsia 2019    Epigastric pain 2019    Continuous opioid dependence ([facility])     Decubitus ulcer of ischium, left, stage IV ([facility]) 2018    Diarrhea 2018    Amputated right leg ([facility]) 2018    Anxiety 2018    GERD (gastroesophageal reflux disease)     History of osteomyelitis      Cyst of joint of shoulder 10/20/2016    Anemia 10/20/2016    Paraplegic spinal paralysis (HCC) 10/20/2016    Sinus tachycardia 10/20/2016    Stage IV pressure ulcer of sacral region (HCC) 10/15/2016    Stage IV pressure ulcer of left heel (HCC) 10/15/2016    Diabetes mellitus type II, controlled (HCC) 03/07/2014    Benign essential hypertension 07/31/2013      LOS (days): 1  Geometric Mean LOS (GMLOS) (days):   Days to GMLOS:     OBJECTIVE:    Risk of Unplanned Readmission Score: 12.14         Current admission status: Inpatient       Preferred Pharmacy:   [id number] [place], [place] - [address]  [address]  [facility] [address]  Phone: [phone] Fax: [phone]    [id number] - [place] - [address]  [address] [facility] [address]  Phone: [phone] Fax: [phone]    Primary Care Provider: Lexy Bo MD    Primary Insurance: MEDICARE  Secondary Insurance: [facility]    ASSESSMENT:  Active Health Care Proxies       Shanna Arguello Health Care Representative - Spouse   Primary Phone: [phone] (Mobile)  Home Phone: [phone]                           Readmission Root Cause  30 Day Readmission: No    Patient Information  Admitted from:: Home  Mental Status: Alert  During Assessment patient was accompanied by: Not accompanied during assessment  Assessment information provided by:: Patient  Primary Caregiver: Self  Support Systems: Self  County of Residence: [place]  What city do you live in?: [place]  Home entry access options. Select all that apply.: Ramp  Type of Current Residence: Apartment  Floor Level: 1  Upon entering residence, is there a bedroom on the main floor (no further steps)?: Yes  Upon entering residence, is there a bathroom on the main floor (no further steps)?: Yes  Living Arrangements: Lives w/ Spouse/significant other  Is patient a ?: No    Activities of Daily Living Prior to  Admission  Functional Status: Assistance  Completes ADLs independently?: No  Level of ADL dependence: Assistance  Ambulates independently?: No  Level of ambulatory dependence: Assistance  Does patient use assisted devices?: Yes  Assisted Devices (DME) used: Wheelchair  Does patient currently own DME?: Yes  What DME does the patient currently own?: Wheelchair  Does patient have a history of Outpatient Therapy (PT/OT)?: No  Does the patient have a history of Short-Term Rehab?: No  Does patient have a history of HHC?: Yes ([facility])  Does patient currently have HHC?: Yes    Current Home Health Care  Type of Current Home Care Services: Nurse visit  Current Home Health Agency:: [facility]  Current Home Health Follow-Up Provider:: PCP    Patient Information Continued  Income Source: SSI/SSD  Does patient have prescription coverage?: Yes  Does patient receive dialysis treatments?: No  Does patient have a history of substance abuse?: No  Does patient have a history of Mental Health Diagnosis?: No         Means of Transportation  Means of Transport to Appts:: Family transport      Housing Stability: Not on file   Food Insecurity: No Food Insecurity (8/17/2023)    Hunger Vital Sign     Worried About Running Out of Food in the Last Year: Never true     Ran Out of Food in the Last Year: Never true   Transportation Needs: No Transportation Needs (8/17/2023)    PRAPARE - Transportation     Lack of Transportation (Medical): No     Lack of Transportation (Non-Medical): No   Utilities: Not on file       DISCHARGE DETAILS:    Discharge planning discussed with:: patient  Freedom of Choice: Yes  Comments - Freedom of Choice: FOC discussed, prior to admission was receiving [facility] for wound care- will place return referral  CM contacted family/caregiver?: No- see comments (pt AAO)  Were Treatment Team discharge recommendations reviewed with patient/caregiver?: Yes  Did patient/caregiver verbalize understanding of patient care needs?:  Yes  Were patient/caregiver advised of the risks associated with not following Treatment Team discharge recommendations?: Yes    Contacts  Patient Contacts: Wife- Shanna Moscat  Relationship to Patient:: Family  Contact Method: Phone  Phone Number: [phone]  Reason/Outcome: Emergency Contact, Discharge Planning, Continuity of Care    Requested Home Health Care         Is the patient interested in HHC at discharge?: Yes  Home Health Discipline requested:: Nursing  Home Health Agency Name:: [facility]  Home Health Follow-Up Provider:: SONALI  Home Health Services Needed:: Wound/Ostomy Care  Homebound Criteria Met:: Uses an Assist Device (i.e. cane, walker, etc), Requires the Assistance of Another Person for Safe Ambulation or to Leave the Home  Supporting Clincal Findings:: Bed Bound or Wheelchair Bound          CM introduced self and role to patient at bedside.   Patient reports he resides with his wife in 1st floor apartment. Pt uses power wheelchair at baseline and require assistance with ADLs.   Patient open with [facility] for wound care and would like return referral post d/c.   Wife transports, or he uses [facility] bus    [facility] Referral placed for REBECCA  Will follow.

## 2024-01-18 NOTE — PROGRESS NOTES
"Progress Note - Thoracic Surgery   Rikki Farooqcat 64 y.o. male MRN: [id number]  Unit/Bed#: [facility] 823-01 Encounter: [id number]    Assessment:  64 y.o. male who presents with a right chest wall wound. Concerns for osteomyelitis.    Episode of fever 101.3 and hypotension 81/59 at 10pm, now vitals normal on room air  UOP 1000cc    WBC pending (from 6.29)  Hb pending (from 11.0)  Cr pending (from 0.44)    1/17 Wound Culture: Beta hemolytic streptococcus group G  1/17 Blood Cultures: No growth to date  1/18 Blood Cultures: Pending    Plan:  Diet as tolerated  Appreciate ID recommendations, recommended no antibiotics yesterday given no evidence of active infection however started on Rocephin/Vanc overnight given fever and hypotension, follow up further recommendations  Plastic surgery consulted for recommendations regarding reconstruction options  Follow up blood cultures  Daily packing changes, changed on rounds this morning  May require rib resection with chest wall reconstruction, ongoing surgical planning  PRN analgesia  DVT PPX  IS/pulm toilet  OOB/ambulate    Subjective/Objective     Subjective: Episode of fever and hypotension overnight, lactate checked and normal, blood cultures sent and pending, started on antibiotics and given 1L bolus, tolerating diet, pain controlled, no new complaints    Objective:     Blood pressure 102/63, pulse 82, temperature 98.8 °F (37.1 °C), resp. rate 18, height 5' 7\" (1.702 m), weight 65.8 kg (145 lb), SpO2 97%.,Body mass index is 22.71 kg/m².      Intake/Output Summary (Last 24 hours) at 1/19/2024 0537  Last data filed at 1/19/2024 0459  Gross per 24 hour   Intake --   Output 1000 ml   Net -1000 ml       Invasive Devices       Peripheral Intravenous Line  Duration             Peripheral IV 01/17/24 Right Antecubital 1 day              Drain  Duration             Colostomy LLQ 1 day    Suprapubic Catheter 1 day                    Physical Exam:   Gen:    NAD  CV:      warm, " well-perfused  Lungs: No respiratory distress on room air, chest wall wound with moderate serous drainage, packing changed on rounds and new allevyn placed  Abd:     soft, NT/ND  Ext:      no CCE  Neuro: Paraplegia, A&Ox3

## 2024-01-18 NOTE — PLAN OF CARE

## 2024-01-18 NOTE — PROGRESS NOTES
"Thoracic Surgery  Progress Note   Rikki Arguello 64 y.o. male MRN: [id number]  Unit/Bed#: [facility] 823-01 Encounter: [id number]    Assessment:  Rikki Arguello is a 64 y.o. male who presents with a right chest wall wound. Concerns for osteomyelitis. HD stable.      Fluctuating bradycardia and tachycardia, rest of vital signs stable on room air  UOP: 200 cc    WBC pending from 7.78  Hgb pending from 9.9  Plts pending from 435  Cr pending from 0.36    Plan:  -ID Consult  -Plastic Surgery Consult  -Wound care Consult  -F/u blood cultures  -F/u wound culture  -AM labs  -Pain/ nausea control PRN  -OOB/ ambulation  -Incentive Spirometry        Subjective/Objective     Subjective:   Patient seen and examined at bedside, in no acute distress. No acute events overnight.  Patient reports the same amount of pain near the wound.  Patient denies nausea vomiting fever chills chest pain or shortness of breath.  Patient is tolerating diet and passing bowel movements.    Objective:   Vitals:Blood pressure 99/67, pulse 94, temperature 97.7 °F (36.5 °C), resp. rate 16, height 5' 7\" (1.702 m), weight 65.8 kg (145 lb), SpO2 94%.  Temp (24hrs), Av.8 °F (36.6 °C), Min:97.7 °F (36.5 °C), Max:98 °F (36.7 °C)      I/O          0701   0700  0701   0700    Urine (mL/kg/hr)  200    Total Output  200    Net  -200                  Invasive Devices       Peripheral Intravenous Line  Duration             Peripheral IV 24 Right Antecubital <1 day              Drain  Duration             Colostomy LLQ <1 day    Suprapubic Catheter <1 day                    Physical Exam:  General: No acute distress  Neuro: Awake, Alert and Oriented x 3  HEENT:  Normocephalic, atraumatic, moist mucous membranes  CV: Regular rate and rhythm  Lungs: Normal work of breathing, no increased respiratory effort  Chest: Right chest wall wound. Some serous drainage appreciated. No bone exposed.  Minimal tenderness   Abdomen: Soft, non-tender, " non-distended  Extremities: No edema, clubbing or cyanosis  Skin: Warm, dry    Lab Results   Component Value Date    WBC 7.78 01/17/2024    HGB 9.9 (L) 01/17/2024    HCT 35.5 (L) 01/17/2024    MCV 80 (L) 01/17/2024     (H) 01/17/2024      Lab Results   Component Value Date     02/17/2014    SODIUM 136 01/17/2024    K 4.6 01/17/2024     01/17/2024    CO2 23 01/17/2024    ANIONGAP 8 02/17/2014    AGAP 7 01/17/2024    BUN 13 01/17/2024    CREATININE 0.36 (L) 01/17/2024    GLUC 70 01/17/2024    GLUF 62 (L) 08/29/2023    CALCIUM 9.0 01/17/2024    AST 17 01/17/2024    ALT 7 01/17/2024    ALKPHOS 76 01/17/2024    PROT 8.0 02/17/2014    TP 8.8 (H) 01/17/2024    BILITOT 0.3 02/17/2014    TBILI 0.26 01/17/2024    EGFR 132 01/17/2024       VTE Prophylaxis: Heparin        Akhil Mills MD  1/18/2024  6:29 AM
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
## 2024-01-19 LAB
ANION GAP SERPL CALCULATED.3IONS-SCNC: 9 MMOL/L
BACTERIA WND AEROBE CULT: ABNORMAL
BACTERIA WND AEROBE CULT: ABNORMAL
BASOPHILS # BLD AUTO: 0.02 THOUSANDS/ÂΜL (ref 0–0.1)
BASOPHILS NFR BLD AUTO: 1 % (ref 0–1)
BUN SERPL-MCNC: 15 MG/DL (ref 5–25)
CALCIUM SERPL-MCNC: 8.3 MG/DL (ref 8.4–10.2)
CHLORIDE SERPL-SCNC: 106 MMOL/L (ref 96–108)
CO2 SERPL-SCNC: 23 MMOL/L (ref 21–32)
CREAT SERPL-MCNC: 0.42 MG/DL (ref 0.6–1.3)
EOSINOPHIL # BLD AUTO: 0.07 THOUSAND/ÂΜL (ref 0–0.61)
EOSINOPHIL NFR BLD AUTO: 2 % (ref 0–6)
ERYTHROCYTE [DISTWIDTH] IN BLOOD BY AUTOMATED COUNT: 21.1 % (ref 11.6–15.1)
FLUAV RNA RESP QL NAA+PROBE: NEGATIVE
FLUBV RNA RESP QL NAA+PROBE: NEGATIVE
GFR SERPL CREATININE-BSD FRML MDRD: 123 ML/MIN/1.73SQ M
GLUCOSE SERPL-MCNC: 104 MG/DL (ref 65–140)
GLUCOSE SERPL-MCNC: 114 MG/DL (ref 65–140)
GLUCOSE SERPL-MCNC: 73 MG/DL (ref 65–140)
GLUCOSE SERPL-MCNC: 76 MG/DL (ref 65–140)
GRAM STN SPEC: ABNORMAL
GRAM STN SPEC: ABNORMAL
HCT VFR BLD AUTO: 33.7 % (ref 36.5–49.3)
HGB BLD-MCNC: 9.5 G/DL (ref 12–17)
HIV 1+2 AB+HIV1 P24 AG SERPL QL IA: NORMAL
HIV1 P24 AG SER QL: NORMAL
IMM GRANULOCYTES # BLD AUTO: 0.01 THOUSAND/UL (ref 0–0.2)
IMM GRANULOCYTES NFR BLD AUTO: 0 % (ref 0–2)
LYMPHOCYTES # BLD AUTO: 1.13 THOUSANDS/ÂΜL (ref 0.6–4.47)
LYMPHOCYTES NFR BLD AUTO: 31 % (ref 14–44)
MAGNESIUM SERPL-MCNC: 1.9 MG/DL (ref 1.9–2.7)
MCH RBC QN AUTO: 22.1 PG (ref 26.8–34.3)
MCHC RBC AUTO-ENTMCNC: 28.2 G/DL (ref 31.4–37.4)
MCV RBC AUTO: 78 FL (ref 82–98)
MONOCYTES # BLD AUTO: 0.35 THOUSAND/ÂΜL (ref 0.17–1.22)
MONOCYTES NFR BLD AUTO: 10 % (ref 4–12)
NEUTROPHILS # BLD AUTO: 2.03 THOUSANDS/ÂΜL (ref 1.85–7.62)
NEUTS SEG NFR BLD AUTO: 56 % (ref 43–75)
NRBC BLD AUTO-RTO: 0 /100 WBCS
PLATELET # BLD AUTO: 326 THOUSANDS/UL (ref 149–390)
PMV BLD AUTO: 9.7 FL (ref 8.9–12.7)
POTASSIUM SERPL-SCNC: 3.3 MMOL/L (ref 3.5–5.3)
RBC # BLD AUTO: 4.3 MILLION/UL (ref 3.88–5.62)
RSV RNA RESP QL NAA+PROBE: NEGATIVE
SARS-COV-2 RNA RESP QL NAA+PROBE: NEGATIVE
SODIUM SERPL-SCNC: 138 MMOL/L (ref 135–147)
WBC # BLD AUTO: 3.61 THOUSAND/UL (ref 4.31–10.16)

## 2024-01-19 PROCEDURE — 99232 SBSQ HOSP IP/OBS MODERATE 35: CPT | Performed by: THORACIC SURGERY (CARDIOTHORACIC VASCULAR SURGERY)

## 2024-01-19 PROCEDURE — 86803 HEPATITIS C AB TEST: CPT | Performed by: THORACIC SURGERY (CARDIOTHORACIC VASCULAR SURGERY)

## 2024-01-19 PROCEDURE — 83735 ASSAY OF MAGNESIUM: CPT | Performed by: STUDENT IN AN ORGANIZED HEALTH CARE EDUCATION/TRAINING PROGRAM

## 2024-01-19 PROCEDURE — 85025 COMPLETE CBC W/AUTO DIFF WBC: CPT | Performed by: STUDENT IN AN ORGANIZED HEALTH CARE EDUCATION/TRAINING PROGRAM

## 2024-01-19 PROCEDURE — 87806 HIV AG W/HIV1&2 ANTB W/OPTIC: CPT | Performed by: THORACIC SURGERY (CARDIOTHORACIC VASCULAR SURGERY)

## 2024-01-19 PROCEDURE — 99222 1ST HOSP IP/OBS MODERATE 55: CPT | Performed by: PLASTIC SURGERY

## 2024-01-19 PROCEDURE — 87340 HEPATITIS B SURFACE AG IA: CPT | Performed by: THORACIC SURGERY (CARDIOTHORACIC VASCULAR SURGERY)

## 2024-01-19 PROCEDURE — 82948 REAGENT STRIP/BLOOD GLUCOSE: CPT

## 2024-01-19 PROCEDURE — 99232 SBSQ HOSP IP/OBS MODERATE 35: CPT | Performed by: INTERNAL MEDICINE

## 2024-01-19 PROCEDURE — 0241U HB NFCT DS VIR RESP RNA 4 TRGT: CPT | Performed by: INTERNAL MEDICINE

## 2024-01-19 PROCEDURE — 80048 BASIC METABOLIC PNL TOTAL CA: CPT | Performed by: STUDENT IN AN ORGANIZED HEALTH CARE EDUCATION/TRAINING PROGRAM

## 2024-01-19 RX ORDER — MAGNESIUM SULFATE HEPTAHYDRATE 40 MG/ML
2 INJECTION, SOLUTION INTRAVENOUS ONCE
Status: COMPLETED | OUTPATIENT
Start: 2024-01-19 | End: 2024-01-19

## 2024-01-19 RX ORDER — CEFAZOLIN SODIUM 1 G/50ML
1000 SOLUTION INTRAVENOUS EVERY 8 HOURS
Status: DISCONTINUED | OUTPATIENT
Start: 2024-01-19 | End: 2024-01-25

## 2024-01-19 RX ORDER — POTASSIUM CHLORIDE 20 MEQ/1
40 TABLET, EXTENDED RELEASE ORAL ONCE
Status: COMPLETED | OUTPATIENT
Start: 2024-01-19 | End: 2024-01-19

## 2024-01-19 RX ORDER — POTASSIUM CHLORIDE 14.9 MG/ML
20 INJECTION INTRAVENOUS ONCE
Status: COMPLETED | OUTPATIENT
Start: 2024-01-19 | End: 2024-01-19

## 2024-01-19 RX ORDER — VANCOMYCIN HYDROCHLORIDE 1 G/200ML
1000 INJECTION, SOLUTION INTRAVENOUS ONCE AS NEEDED
Status: DISCONTINUED | OUTPATIENT
Start: 2024-01-19 | End: 2024-01-19

## 2024-01-19 RX ADMIN — HEPARIN SODIUM 5000 UNITS: 5000 INJECTION INTRAVENOUS; SUBCUTANEOUS at 14:22

## 2024-01-19 RX ADMIN — ASPIRIN 81 MG: 81 TABLET, COATED ORAL at 08:58

## 2024-01-19 RX ADMIN — Medication 250 MG: at 08:58

## 2024-01-19 RX ADMIN — CEFTRIAXONE SODIUM 1000 MG: 10 INJECTION, POWDER, FOR SOLUTION INTRAVENOUS at 00:17

## 2024-01-19 RX ADMIN — POTASSIUM CHLORIDE 20 MEQ: 14.9 INJECTION, SOLUTION INTRAVENOUS at 09:26

## 2024-01-19 RX ADMIN — OXYCODONE HYDROCHLORIDE 10 MG: 10 TABLET ORAL at 20:31

## 2024-01-19 RX ADMIN — MAGNESIUM SULFATE HEPTAHYDRATE 2 G: 40 INJECTION, SOLUTION INTRAVENOUS at 11:45

## 2024-01-19 RX ADMIN — HEPARIN SODIUM 5000 UNITS: 5000 INJECTION INTRAVENOUS; SUBCUTANEOUS at 21:41

## 2024-01-19 RX ADMIN — HYDROMORPHONE HYDROCHLORIDE 0.5 MG: 1 INJECTION, SOLUTION INTRAMUSCULAR; INTRAVENOUS; SUBCUTANEOUS at 16:23

## 2024-01-19 RX ADMIN — POTASSIUM CHLORIDE 40 MEQ: 1500 TABLET, EXTENDED RELEASE ORAL at 09:19

## 2024-01-19 RX ADMIN — Medication 250 MG: at 18:22

## 2024-01-19 RX ADMIN — OXYCODONE HYDROCHLORIDE 10 MG: 10 TABLET ORAL at 09:19

## 2024-01-19 RX ADMIN — CEFAZOLIN SODIUM 1000 MG: 1 SOLUTION INTRAVENOUS at 21:41

## 2024-01-19 RX ADMIN — HEPARIN SODIUM 5000 UNITS: 5000 INJECTION INTRAVENOUS; SUBCUTANEOUS at 04:52

## 2024-01-19 RX ADMIN — VANCOMYCIN HYDROCHLORIDE 1750 MG: 1 INJECTION, POWDER, LYOPHILIZED, FOR SOLUTION INTRAVENOUS at 01:09

## 2024-01-19 RX ADMIN — OXYCODONE HYDROCHLORIDE 10 MG: 10 TABLET ORAL at 15:22

## 2024-01-19 RX ADMIN — PANTOPRAZOLE SODIUM 40 MG: 40 TABLET, DELAYED RELEASE ORAL at 04:53

## 2024-01-19 NOTE — PROGRESS NOTES
Progress Note - Infectious Disease   Rikki Farooqcat 64 y.o. male MRN: [id number]  Unit/Bed#: [facility] 823-01 Encounter: [id number]      Impression/Plan:    Brief Summary of Case & Impression:    65 y/o M with long-term paraplegia of spinal origin c/b multiple SSTI hx 2/2 pressure-related ulcers who comes here for non-emergent tx of open R lateral chest wall wound c/b OM. Wound noted 6 months ago and has been monitored by wound care OP, however wound has been increasing in size and recent CT showed OM. For this reason he was recommended by OP wound care to come to [facility] seeking potential closure of open/bone-exposed pressure wound.     On admission patient has been afebrile, VSS, without leukocytosis.  Wound and blood cultures have been obtained.  Wound has grown 4+ GBS, and blood culture still pending. Has been seen by Plastics who do plan on correcting wound, however no time-frame established at this moment.    Did have episode of fever to 101.3 ovn and sBP in 90s, is therefore now on CTX/Vanc. Note that BP did respond to 1L IVF.    # One-time Fever to 101.3 deg F  sBP at 90s  Responsive to IVF  No Associated Leukocytosis  One-time episode of high-grade fever and brief drop in SBP to low 90s  Resolved status post 1 L IVF, and all future temps not febrile  Patient had no complaints at this time.  Continues to have no fever/chills or other signs/symptoms of infection     # Open Pressure Ulcer w/ Exposed Bone on Lateral R Mid-chest Wall w/o Evidence of Cellulitis  Lat R 6th-8th Rib Fx c/b OM underlying Open Wound  Wclx with 4+ GBS  Per plastics, will be available for wound reconstruction. Awaiting d/w Thoracics re: timing of surgery  Wound care on-board, remains covered and with pressure dressings  Unlikely that ax on cx OM would cause fever or leukocytosis  Will require surgical tx of wound/OM prior to beginning long-term abx    # H/o Paraplegia   C/b Frequent Pressure-related Wounds, h/o MRSA Colonization  C/b Detrussor  Dysfxn now s/p Suprapubic Cather, Reccurrent UTI with h/o ESBL/MDRO  Patient has regular follow-up with medical and surgical wound care outpatient.  Additionally has 24/7 wound care home health  Typically has sacral decubiti ulcers, however this pressure wound on his thorax is a new complication  Has not had a recent flap procedure for other decubiti.  Do note that in the past he has had the surgeries which have not been successful  No concern of additional sources of infection from urinary or other skin/soft tissue origin     # Wheelchair-bound  This is related to patient's paraplegia and post AKA status  Seems that patient spends a good amount of time in his wheelchair, i.e. enough to cause this right lateral mid chest wall pressure wound  Is open to finding ways to avoid repeat pressure wounds on the thorax    # H/o T2DM, well-controlled, non-insulin dependent  A1c well below 7% per last check  Will likely impact wound healing overall, however should not impair wound to completely close  Non-insulin-dependent which avoid additional alleys of infection  Mgmt per primary team     # HTN  HLD  NormoTNive so far throughout hospitalization  Is on antiHTN and statin at home, would continue while IP  Mgmt per primary team        Plan:     De-escalate ceftriaxone to cefazolin.  Consider stopping antibiotics.  Osteomyelitis is not a cause of fever/leukocytosis  Follow-up with Thoracics/plastics regarding timeframe for surgical closure  Pain mgmt, DVT ppx per primary team  Monitor fever/WBC curves    Above plan and management was discussed with following providers: Will f/u with Thoracics later today  Above plan and management was discussed with patient    WILL DISCUSS FINAL PLANS ON ROUNDS AND UPDATE TEAMS ACCORDINGLY. Please wait for final recommendations from Dr. Salomon.  ID consult service will continue to follow.    I have spent 75/55 or 50/35 minutes with Patient/family, other providers and in review of records today in  completing this visit. Total time spent included review of testing, ordering medications and tests, communicating with other providers, documentation time and counseling/providing education to patient, family, caregiver and coordination of outpatient care as detailed in my note.     Antibiotics:    Current:  IV CTX, D2/?  IV Vancomycin, D2/?    24 Hour events:    Overnight had one-time episode of temp to 101.3, associated with SBP in low 90s.  Patient was given 1 L of fluid, which his blood pressure did respond to.  Further temperatures afterwards or nonfebrile.  During this time patient did not have any active complaints, however he was started on ceftriaxone and vancomycin.    Patient seen earlier this morning resting comfortably in hospital bed with family at bedside.  Still with no active complaints including no fever/chills, myalgias, congestion/rhinorrhea.    Subjective:  Patient has no fever, chills, sweats; no nausea, vomiting, diarrhea; no cough, shortness of breath; no pain. No new symptoms.    History from independent historian    Objective:  Vitals:  Temp:  [98.8 °F (37.1 °C)-101.3 °F (38.5 °C)] 99.2 °F (37.3 °C)  HR:  [57-99] 57  Resp:  [16-18] 16  BP: ()/(50-80) 158/80  SpO2:  [95 %-98 %] 97 %  Temp (24hrs), Av.7 °F (37.6 °C), Min:98.8 °F (37.1 °C), Max:101.3 °F (38.5 °C)  Current: Temperature: 99.2 °F (37.3 °C)    Physical Exam:   General Appearance:  Alert, interactive, nontoxic, no acute distress.   Throat: Oropharynx moist without lesions.    Lungs:   Clear to auscultation bilaterally; no wheezes, rhonchi or rales; respirations unlabored   Heart:  RRR; no murmur, rub or gallop   Abdomen:   Soft, non-tender, non-distended, positive bowel sounds.     Extremities: No clubbing, cyanosis or edema   Skin: Open, deep wound of ~5in diameter on R lateral mid-back. No surrounding erythema/edema/purulence. Bone is exposed. Very mild TTP of wound.        Labs, Imaging, & Other studies:   All  pertinent labs and imaging studies were personally reviewed as below.  Results from last 7 days   Lab Units 01/19/24  0457 01/18/24  0615 01/17/24  1147   WBC Thousand/uL 3.61* 6.29 7.78   HEMOGLOBIN g/dL 9.5* 11.0* 9.9*   PLATELETS Thousands/uL 326 386 435*     Results from last 7 days   Lab Units 01/19/24  0457 01/18/24  1024 01/17/24  1147   POTASSIUM mmol/L 3.3* 3.6 4.6   CHLORIDE mmol/L 106 100 106   CO2 mmol/L 23 26 23   BUN mg/dL 15 19 13   CREATININE mg/dL 0.42* 0.44* 0.36*   EGFR ml/min/1.73sq m 123 121 132   CALCIUM mg/dL 8.3* 8.9 9.0   AST U/L  --  11* 17   ALT U/L  --  7 7   ALK PHOS U/L  --  77 76     Results from last 7 days   Lab Units 01/18/24  2305 01/18/24  1024 01/17/24  1747 01/17/24  1147   BLOOD CULTURE  Received in Microbiology Lab. Culture in Progress.  Received in Microbiology Lab. Culture in Progress. Received in Microbiology Lab. Culture in Progress.  --  No Growth at 24 hrs.   GRAM STAIN RESULT   --   --  2+ Gram positive cocci in pairs and chains*  No polys seen*  --    WOUND CULTURE   --   --  4+ Growth of Beta Hemolytic Streptococcus Group G*  3+ Growth of  --        Miguel Candelaria MD  Internal Medicine Residency PGY-2  [facility]

## 2024-01-19 NOTE — PROGRESS NOTES
Rikki Arguello is a 64 y.o. male who is currently ordered Vancomycin IV with management by the Pharmacy Consult service.  Relevant clinical data and objective / subjective history reviewed.  Vancomycin Assessment:  Indication and Goal AUC/Trough: Bone/joint infection (goal -600, trough >10); Soft tissue (goal -600, trough >10)  Clinical Status: stable  Micro:     Renal Function:  SCr: 0.44 mg/dL  CrCl: 131.4 mL/min  Renal replacement: Not on dialysis  Days of Therapy: 1  Current Dose: vancomycin 1750 mg once  Vancomycin Plan:  New Dosing: vancomycin 1000 mg once as needed when vancomycin level < 15 mcg/ml  Estimated AUC: n/a  Estimated Trough: n/a  Next Level: 01/20/24 at 0600  Renal Function Monitoring: Daily BMP and UOP  Pharmacy will continue to follow closely for s/sx of nephrotoxicity, infusion reactions and appropriateness of therapy.  BMP and CBC will be ordered per protocol. We will continue to follow the patient’s culture results and clinical progress daily.    Shannan Peace, Pharmacist

## 2024-01-19 NOTE — DISCHARGE INSTR - OTHER ORDERS
Skin Care orders:  1-Silicone bordered foam to the left heel parminder with a P and date and check skin integrity every shift change every 3 days   2-Roho  cushion when out of bed in chair.  3-Moisturize skin daily with skin nourishing cream  4-Elevate Left heel to offload pressure.  5-Turn/reposition q2h for pressure re-distribution on skin  6. Right flank wound - per plastics team  7. Left  buttocks wound - cleanse with Normal saline then apply meglisorb calcium alginate then top with the silicone bordered foam change every other day   8. Left ischial wound - cleanse with Normal saline then pack with wet to dry jagjit and secure with the silicone bordered foam change daily   9. Posterior perineum cleanse with soap and water then apply triad paste to the open area TID and prn   10 P - 500 low air loss mattress     Upon discharge follow at the [facility] wound [facility] with Dr. Hendrickson

## 2024-01-19 NOTE — WOUND OSTOMY CARE
Consult Note - Wound   Rikki TEE Moscat 64 y.o. male MRN: [id number]  Unit/Bed#: [facility] 823-01 Encounter: [id number]        History and Present Illness: Patient is seen for wound care consult today . He is a 64 year old male that is a paraplegic spinal paralysis . He has a tilt and space wheelchair and has a roho cushion on the wheelchair . He has a low air loss bed at home . He has a colostomy and a catheter to manage bowel and bladder . He has 6-8 rib osteomyelitis . He has  MDRO,ESBL, and MRSA. He has several wounds that were being managed at the wound center. He has a plastic surgery consult for the area on the right lateral chest wall. This is a pressure injury due to how he sits in his wheelchair . He is on the P- 500 low air loss mattress .      Assessment Findings:   Right chest wall wound - POA stage 4 area . Wound bed is 100% granular . No foul odor noted . Drainage is moderate amount noted on the old dressing   Left heel is dry and intact with callus .   Left knee - is a closed callus scabbed area   Left buttocks - partial thickness wound 100% pink related to the tape . Small bloody drainage   Perineum wound - POA unstageable area in the rectal area that is full thickness and has 80% slough in the wound bed . Will order triad paste due to location unable to apply a dressing   Left buttocks / ischial area - POA stage 4 area . Wound bed is 75% pink red and 25%slough . Noted edges with white maceration . Moderate serosanguinous drainage .  Right leg AKA that has been healed .     Patient reports he has been in the wheelchair for about 20 years . Noted old scarring from previous flap surgery of the sacral buttocks area . Noted old scarring to the right elbow area from previous surgery. Scattered old scarring on bilateral buttocks     No induration, fluctuance, odor, warmth/temperature differences, redness, or purulence noted to the above noted wounds and skin areas assessed. New dressings applied per orders  listed below. Patient tolerated well- no s/s of non-verbal pain or discomfort observed during the encounter. Bedside nurse aware of plan of care. See flow sheets for more detailed assessment findings. Wound care will continue to follow.       Skin Care orders:  1-Silicone bordered foam to the left heel parminder with a P and date and check skin integrity every shift change every 3 days   2-Roho  cushion when out of bed in chair.  3-Moisturize skin daily with skin nourishing cream  4-Elevate Left heel to offload pressure.  5-Turn/reposition q2h for pressure re-distribution on skin  6. Right flank wound - cleanse with Normal saline then pack with wet to dry jagjit then secure with the silicone bordered foam change daily   7. Left  buttocks wound - cleanse with Normal saline then apply meglisorb calcium alginate then top with the silicone bordered foam change every other day   8. Left ischial wound - cleanse with Normal saline then pack with wet to dry jagjit and secure with the silicone bordered foam change daily   9. Posterior perineum cleanse with soap and water then apply triad paste to the open area TID and prn   10 P - 500 low air loss mattress               Wounds:  Wound 08/26/20 Pressure Injury Buttocks Left (Active)   Wound Image   01/18/24 1137   Wound Description Beefy red;Fragile;Slough;White 01/18/24 1137   Pressure Injury Stage 4 01/18/24 1137   Shelby-wound Assessment Intact;Fragile;Pink 01/17/24 1940   Wound Length (cm) 4.5 cm 01/18/24 1137   Wound Width (cm) 2 cm 01/18/24 1137   Wound Depth (cm) 1.5 cm 01/18/24 1137   Wound Surface Area (cm^2) 9 cm^2 01/18/24 1137   Wound Volume (cm^3) 13.5 cm^3 01/18/24 1137   Calculated Wound Volume (cm^3) 13.5 cm^3 01/18/24 1137   Change in Wound Size % 53.13 01/18/24 1137   Number of underminings 1 01/18/24 1137   Undermining 1 6 01/18/24 1137   Undermining 1 is depth extending from 3-12 01/18/24 1137   Drainage Amount Moderate 01/18/24 1137   Drainage Description  Serosanguineous 01/18/24 1137   Non-staged Wound Description Full thickness 01/18/24 1137   Treatments Cleansed;Irrigation with NSS;Site care 01/18/24 1137   Dressing Other (Comment) 01/18/24 1137   Dressing Changed Changed 01/18/24 1137   Patient Tolerance Tolerated well 01/18/24 1137   Dressing Status Clean;Dry;Intact 01/17/24 1940       Wound 07/29/21 Pressure Injury Back Right;Lower;Lateral (Active)       Wound 11/05/21 Pressure Injury Perineum (Active)   Wound Image   01/18/24 1140   Wound Description Fragile;Slough;Pink 01/18/24 1140   Pressure Injury Stage U 01/18/24 1140   Shelby-wound Assessment Clean;Dry;Intact 01/18/24 1140   Wound Length (cm) 1 cm 01/18/24 1140   Wound Width (cm) 2 cm 01/18/24 1140   Wound Depth (cm) 0.5 cm 01/18/24 1140   Wound Surface Area (cm^2) 2 cm^2 01/18/24 1140   Wound Volume (cm^3) 1 cm^3 01/18/24 1140   Calculated Wound Volume (cm^3) 1 cm^3 01/18/24 1140   Change in Wound Size % -900 01/18/24 1140   Drainage Amount Small 01/18/24 1140   Drainage Description Serosanguineous 01/18/24 1140   Non-staged Wound Description Full thickness 01/18/24 1140   Treatments Cleansed;Site care 01/18/24 1140   Patient Tolerance Tolerated well 01/18/24 1140       Wound 01/17/24 Pressure Injury Flank Right;Upper (Active)   Wound Image   01/18/24 1128   Wound Description Clean;Beefy red 01/18/24 1128   Pressure Injury Stage 4 01/18/24 1128   Shelby-wound Assessment Clean;Dry;Intact 01/18/24 1128   Wound Length (cm) 6 cm 01/18/24 1128   Wound Width (cm) 2.8 cm 01/18/24 1128   Wound Depth (cm) 1.7 cm 01/18/24 1128   Wound Surface Area (cm^2) 16.8 cm^2 01/18/24 1128   Wound Volume (cm^3) 28.56 cm^3 01/18/24 1128   Calculated Wound Volume (cm^3) 28.56 cm^3 01/18/24 1128   Number of underminings 1 01/18/24 1128   Undermining 1 4 01/18/24 1128   Undermining 1 is depth extending from 11-7 01/18/24 1128   Drainage Amount Moderate 01/18/24 1128   Drainage Description Serosanguineous 01/18/24 1128   Non-staged  Wound Description Full thickness 01/18/24 1128   Treatments Cleansed;Irrigation with NSS;Site care 01/18/24 1128   Dressing Other (Comment) 01/18/24 1128   Dressing Changed Changed 01/18/24 1128   Patient Tolerance Tolerated well 01/18/24 1128   Dressing Status Clean;Dry;Intact 01/17/24 1940       Wound 01/17/24 Knee Anterior;Left (Active)   Wound Image   01/18/24 1134   Wound Description Clean;Dry;Intact;Brown 01/18/24 1134   Shelby-wound Assessment Clean;Dry;Intact 01/17/24 1940   Wound Length (cm) 0 cm 01/18/24 1134   Wound Width (cm) 0 cm 01/18/24 1134   Wound Depth (cm) 0 cm 01/18/24 1134   Wound Surface Area (cm^2) 0 cm^2 01/18/24 1134   Wound Volume (cm^3) 0 cm^3 01/18/24 1134   Calculated Wound Volume (cm^3) 0 cm^3 01/18/24 1134   Drainage Amount None 01/18/24 1134   Non-staged Wound Description Partial thickness 01/17/24 1940   Treatments Site care 01/18/24 1134   Dressing Open to air 01/18/24 1134   Dressing Changed Reinforced 01/17/24 1940   Patient Tolerance Tolerated well 01/18/24 1134   Dressing Status Clean;Dry;Intact 01/17/24 1940       Wound 01/18/24 Buttocks Left (Active)   Wound Image   01/18/24 1137   Wound Description Clean;Fragile;Pink 01/18/24 1137   Shelby-wound Assessment Clean;Dry;Intact;Scar Tissue 01/18/24 1137   Wound Length (cm) 1.5 cm 01/18/24 1137   Wound Width (cm) 3.5 cm 01/18/24 1137   Wound Depth (cm) 0.1 cm 01/18/24 1137   Wound Surface Area (cm^2) 5.25 cm^2 01/18/24 1137   Wound Volume (cm^3) 0.525 cm^3 01/18/24 1137   Calculated Wound Volume (cm^3) 0.53 cm^3 01/18/24 1137   Drainage Amount Small 01/18/24 1137   Drainage Description Bloody 01/18/24 1137   Non-staged Wound Description Partial thickness 01/18/24 1137   Treatments Cleansed;Site care;Irrigation with NSS 01/18/24 1137   Dressing Calcium Alginate;Foam, Silicon (eg. Allevyn, etc) 01/18/24 1137   Dressing Changed Changed 01/18/24 1137   Patient Tolerance Tolerated well 01/18/24 1137         Wound care will follow weekly  call or tiger text with questions or concerns     Rosa José RN BSN CWOCN

## 2024-01-19 NOTE — CONSULTS
Vancomycin IV Pharmacy-to-Dose Consultation     Vancomycin has been discontinued.  Pharmacy will sign off.  Please contact or re-consult with questions.    Trista Olivo, Pharmacist

## 2024-01-19 NOTE — CONSULTS
Assessment/Plan   65yo male with right lateral chest wound  1.) Patient will likely need soft tissue reconstruction with possible LD flap  2.) Will discuss with thoracic surgery regarding planning and timing  3.) Will available for his reconstruction as needed    Discussion-I discussed with the patient my role in his care as plastic surgery would likely be for soft tissue reconstruction.  I discussed with him that there are multiple options, however the most likely option may be latissimus flap, I discussed with the patient that the options depend largely on the nature of the defect, I discussed with the patient the risks, benefits, alternatives including risk of bleeding, infection, scarring, poor wound healing, damage surrounding underlying structures and need for further surgery, seroma, DVT, flap failure, partial flap failure, all his questions were answered to his satisfaction, we will discuss in more detail with the patient as well as coordinate with thoracic surgery, I discussed with him that I will be available for his reconstructive efforts as needed.    Total time spent in consultation with the patient, reviewing his chart, documentation and coordination of care was greater than 60 minutes.    Subjective   Patient ID: Rikki Arguello is a 64 y.o. male.    Vitals:    01/19/24 0734   BP: 158/80   Pulse: 57   Resp: 16   Temp: 99.2 °F (37.3 °C)   SpO2: 97%     Wound Check      64-year-old wheelchair-bound gentleman developed a right chest wall wound secondary to chronic pressure from the way he was sitting in his wheelchair.  He developed an open wound on his right chest wall which was being managed as an outpatient in the [place] wound care center.  He underwent a chest CT on December 14, 2023 and these images were personally reviewed.  He has a large open wound over his lateral right chest wall.  The opening is over his right eighth interspace and there is some subcutaneous gas above and below this region  suggesting some undermining of his skin.  There appeared to be pathologic fractures of the seventh and likely eighth ribs highly suggestive of osteomyelitis given the open wound.  There is edema of the right chest wall around the area of the wound suggesting cellulitis, is is currently being treated with local wound care and had a fever last night and was started on antibiotics.  He is currently being considered for excision and rib resection with thoracic surgery and will likely need soft tissue reconstruction      The following portions of the patient's history were reviewed and updated as appropriate: allergies, current medications, past family history, past medical history, past social history, past surgical history, and problem list.    Review of Systems   All other systems reviewed and are negative.      Objective   Physical Exam   Constitutional  He appears well-developed and well-nourished.     Eyes  Pupils are equal, round, and reactive to light. System normal.     General -             Right: Right eye extraocular movements are normal.             Left: Left eye extraocular movements are normal.       Skin    Large wound on lateral chest with granulation tissue, no overt signs of surrounding infection or necrosis.  Bases appear clean.     Psychiatric  His behavior is normal. Judgment and thought content normal.       Past Medical History:   Diagnosis Date    Anemia     Blind     r eye    Chronic cystitis     Colostomy in place ([facility])     Detrusor sphincter dyssynergia     Diabetes mellitus ([facility])     Poorly controlled type 2; Last Assessed:  3/18/14    Erectile dysfunction     Frequency of urination     GERD (gastroesophageal reflux disease)     History of diabetes mellitus     History of osteomyelitis     Hx of leg amputation ([facility])     r high upper leg    Hyperlipidemia     Hypertension     Hypospadias     Incomplete bladder emptying     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([facility]) 9/16/2019    Neurogenic bladder      "Paralysis ([facility])     Paraplegia ([facility])     Spinal cord cysts     Ulcer of sacral region ([facility]CC)     Urge incontinence      Past Surgical History:   Procedure Laterality Date    AMPUTATION      At hip; Last Assessed:  1/19/16    BLADDER SURGERY      COLON SURGERY      llq ostomy pouch    COLOSTOMY      COMPLEX CYSTOMETROGRAM  2014    CT CYSTOGRAM  9/19/2019    CYSTOSCOPY  2014    FL CYSTOGRAM  1/5/2015    FL CYSTOGRAM  11/4/2014    FL CYSTOGRAM  10/24/2014    IR PICC REPOSITION  9/23/2019    IR SUPRAPUBIC CATHETER PLACEMENT  9/19/2019    LEG AMPUTATION      MEATOTOMY      PENILE PROSTHESIS  REMOVAL N/A 11/1/2019    Procedure: REMOVAL PROSTHESIS PENILE;  Surgeon: Cuong Phillips MD;  Location: [facility];  Service: Urology    PENILE PROSTHESIS IMPLANT  2011    KS ADJT/REARRGMT SCALP/ARM/LEG 10.1-30.0 SQ CM Left 5/1/2017    Procedure: POSTERIOR THIGH V-Y ADMANCEMENT;  Surgeon: Gal Cardozo MD;  Location: [facility];  Service: Plastics    KS MUSC MYOCUTANEOUS/FASCIOCUTANEOUS FLAP TRUNK Left 5/1/2017    Procedure: FLAP CLOSURE LEFT ISCHIAL WOUND and \"RIGHT\" ISCHIAL FLAP ADVANCEMENT * DEBRIDEMENT, VAC PLACEMENT ;  Surgeon: Gal Cardozo MD;  Location: [facility];  Service: Plastics    KS MUSC MYOCUTANEOUS/FASCIOCUTANEOUS FLAP TRUNK Left 9/27/2017    Procedure: gluteal myocutaneous rotational flap, posterior thigh v to y advancement- wound 5 x 2.5 x 8;  Surgeon: Gal Cardozo MD;  Location: [facility];  Service: Plastics    SPINE SURGERY      Lower back    UROFLOWMETRY SIMPLE / COMPLEX  2014     Current Outpatient Medications   Medication Instructions    Accu-Chek FastClix Lancets MISC Subcutaneous, Daily    acetaminophen (TYLENOL) 650 mg, Oral, Every 6 hours PRN    aspirin (RA ASPIRIN EC) 81 mg, Oral, Daily    Blood Glucose Monitoring Suppl (ONE TOUCH ULTRA 2) w/Device KIT Does not apply, Daily    Disposable Gloves (LATEX GLOVES LARGE) MISC Use as needed up to 3 times a day dispo 2 boxes    ergocalciferol (VITAMIN D2) 50,000 " Units, Oral, Weekly    glucose blood (Accu-Chek Guide) test strip 1 each, Other, Daily, Use as instructed    ibuprofen (MOTRIN) 800 mg, Oral, Every 8 hours PRN    Incontinence Supply Disposable (SUNBEAM INCONTINENT UNDER PAD) MISC Use 1 per week, dispense 4 reusable underpads    Incontinence Supply Disposable MISC Does not apply, 2 times daily    naloxone (NARCAN) 4 mg/0.1 mL nasal spray Administer 1 spray into a nostril. If no response after 2-3 minutes, give another dose in the other nostril using a new spray.    Nutritional Supplements (Glucerna Shake) LIQD 3 Cans, Oral, 3 times daily    omeprazole (PRILOSEC) 40 mg, Oral, Daily    oxyCODONE (ROXICODONE) 15 mg, Oral, Every 6 hours PRN    saccharomyces boulardii (FLORASTOR) 250 mg, Oral, 2 times daily    sodium hypochlorite (DAKIN'S QUARTER-STRENGTH) 1 Application, Topical, Daily       Social History     Social History Narrative    Native language Serbian    Scientologist    Social history reviewed, unchanged     Social History     Tobacco Use   Smoking Status Former    Current packs/day: 0.00    Average packs/day: 0.5 packs/day for 10.0 years (5.0 ttl pk-yrs)    Types: Cigarettes    Start date:     Quit date:     Years since quittin.0   Smokeless Tobacco Never   Tobacco Comments    Onset date:  11/10/17

## 2024-01-20 LAB
ANION GAP SERPL CALCULATED.3IONS-SCNC: 7 MMOL/L
BASOPHILS # BLD AUTO: 0.02 THOUSANDS/ÂΜL (ref 0–0.1)
BASOPHILS NFR BLD AUTO: 1 % (ref 0–1)
BUN SERPL-MCNC: 12 MG/DL (ref 5–25)
CALCIUM SERPL-MCNC: 8.1 MG/DL (ref 8.4–10.2)
CHLORIDE SERPL-SCNC: 104 MMOL/L (ref 96–108)
CO2 SERPL-SCNC: 24 MMOL/L (ref 21–32)
CREAT SERPL-MCNC: 0.34 MG/DL (ref 0.6–1.3)
EOSINOPHIL # BLD AUTO: 0.15 THOUSAND/ÂΜL (ref 0–0.61)
EOSINOPHIL NFR BLD AUTO: 4 % (ref 0–6)
ERYTHROCYTE [DISTWIDTH] IN BLOOD BY AUTOMATED COUNT: 20.8 % (ref 11.6–15.1)
GFR SERPL CREATININE-BSD FRML MDRD: 135 ML/MIN/1.73SQ M
GLUCOSE SERPL-MCNC: 100 MG/DL (ref 65–140)
GLUCOSE SERPL-MCNC: 82 MG/DL (ref 65–140)
GLUCOSE SERPL-MCNC: 83 MG/DL (ref 65–140)
GLUCOSE SERPL-MCNC: 92 MG/DL (ref 65–140)
GLUCOSE SERPL-MCNC: 97 MG/DL (ref 65–140)
HBV SURFACE AG SER QL: NORMAL
HCT VFR BLD AUTO: 31.7 % (ref 36.5–49.3)
HCV AB SER QL: NORMAL
HGB BLD-MCNC: 9.2 G/DL (ref 12–17)
IMM GRANULOCYTES # BLD AUTO: 0.02 THOUSAND/UL (ref 0–0.2)
IMM GRANULOCYTES NFR BLD AUTO: 1 % (ref 0–2)
LYMPHOCYTES # BLD AUTO: 1.18 THOUSANDS/ÂΜL (ref 0.6–4.47)
LYMPHOCYTES NFR BLD AUTO: 29 % (ref 14–44)
MAGNESIUM SERPL-MCNC: 2 MG/DL (ref 1.9–2.7)
MCH RBC QN AUTO: 23.1 PG (ref 26.8–34.3)
MCHC RBC AUTO-ENTMCNC: 29 G/DL (ref 31.4–37.4)
MCV RBC AUTO: 80 FL (ref 82–98)
MONOCYTES # BLD AUTO: 0.39 THOUSAND/ÂΜL (ref 0.17–1.22)
MONOCYTES NFR BLD AUTO: 10 % (ref 4–12)
NEUTROPHILS # BLD AUTO: 2.27 THOUSANDS/ÂΜL (ref 1.85–7.62)
NEUTS SEG NFR BLD AUTO: 55 % (ref 43–75)
NRBC BLD AUTO-RTO: 0 /100 WBCS
PHOSPHATE SERPL-MCNC: 2.4 MG/DL (ref 2.3–4.1)
PLATELET # BLD AUTO: 325 THOUSANDS/UL (ref 149–390)
PMV BLD AUTO: 9.4 FL (ref 8.9–12.7)
POTASSIUM SERPL-SCNC: 3.9 MMOL/L (ref 3.5–5.3)
RBC # BLD AUTO: 3.98 MILLION/UL (ref 3.88–5.62)
SODIUM SERPL-SCNC: 135 MMOL/L (ref 135–147)
WBC # BLD AUTO: 4.03 THOUSAND/UL (ref 4.31–10.16)

## 2024-01-20 PROCEDURE — 80048 BASIC METABOLIC PNL TOTAL CA: CPT | Performed by: STUDENT IN AN ORGANIZED HEALTH CARE EDUCATION/TRAINING PROGRAM

## 2024-01-20 PROCEDURE — 85025 COMPLETE CBC W/AUTO DIFF WBC: CPT | Performed by: STUDENT IN AN ORGANIZED HEALTH CARE EDUCATION/TRAINING PROGRAM

## 2024-01-20 PROCEDURE — 83735 ASSAY OF MAGNESIUM: CPT | Performed by: STUDENT IN AN ORGANIZED HEALTH CARE EDUCATION/TRAINING PROGRAM

## 2024-01-20 PROCEDURE — 99232 SBSQ HOSP IP/OBS MODERATE 35: CPT | Performed by: INTERNAL MEDICINE

## 2024-01-20 PROCEDURE — 84100 ASSAY OF PHOSPHORUS: CPT | Performed by: STUDENT IN AN ORGANIZED HEALTH CARE EDUCATION/TRAINING PROGRAM

## 2024-01-20 PROCEDURE — 99232 SBSQ HOSP IP/OBS MODERATE 35: CPT | Performed by: THORACIC SURGERY (CARDIOTHORACIC VASCULAR SURGERY)

## 2024-01-20 PROCEDURE — 82948 REAGENT STRIP/BLOOD GLUCOSE: CPT

## 2024-01-20 RX ADMIN — Medication 250 MG: at 08:06

## 2024-01-20 RX ADMIN — CEFAZOLIN SODIUM 1000 MG: 1 SOLUTION INTRAVENOUS at 05:14

## 2024-01-20 RX ADMIN — ASPIRIN 81 MG: 81 TABLET, COATED ORAL at 08:08

## 2024-01-20 RX ADMIN — PANTOPRAZOLE SODIUM 40 MG: 40 TABLET, DELAYED RELEASE ORAL at 05:14

## 2024-01-20 RX ADMIN — OXYCODONE HYDROCHLORIDE 10 MG: 10 TABLET ORAL at 05:16

## 2024-01-20 RX ADMIN — CEFAZOLIN SODIUM 1000 MG: 1 SOLUTION INTRAVENOUS at 21:25

## 2024-01-20 RX ADMIN — HEPARIN SODIUM 5000 UNITS: 5000 INJECTION INTRAVENOUS; SUBCUTANEOUS at 05:14

## 2024-01-20 RX ADMIN — Medication 250 MG: at 17:08

## 2024-01-20 RX ADMIN — HEPARIN SODIUM 5000 UNITS: 5000 INJECTION INTRAVENOUS; SUBCUTANEOUS at 21:25

## 2024-01-20 RX ADMIN — HYDROMORPHONE HYDROCHLORIDE 0.5 MG: 1 INJECTION, SOLUTION INTRAMUSCULAR; INTRAVENOUS; SUBCUTANEOUS at 08:44

## 2024-01-20 RX ADMIN — OXYCODONE HYDROCHLORIDE 10 MG: 10 TABLET ORAL at 17:23

## 2024-01-20 RX ADMIN — ACETAMINOPHEN 650 MG: 325 TABLET, FILM COATED ORAL at 21:25

## 2024-01-20 RX ADMIN — OXYCODONE HYDROCHLORIDE 10 MG: 10 TABLET ORAL at 11:42

## 2024-01-20 RX ADMIN — OXYCODONE HYDROCHLORIDE 10 MG: 10 TABLET ORAL at 21:25

## 2024-01-20 RX ADMIN — HEPARIN SODIUM 5000 UNITS: 5000 INJECTION INTRAVENOUS; SUBCUTANEOUS at 13:36

## 2024-01-20 RX ADMIN — CEFAZOLIN SODIUM 1000 MG: 1 SOLUTION INTRAVENOUS at 13:37

## 2024-01-20 NOTE — QUICK NOTE
Wound packing change        Wound repacked with saline soaked kerlix gauze. Patient tolerated the procedure well.    Akhil Mills MD  1/20/2024 11:29 AM

## 2024-01-20 NOTE — PROGRESS NOTES
Progress Note - Infectious Disease   Rikki Arguello 64 y.o. male MRN: [id number]  Unit/Bed#: [facility] 823-01 Encounter: [id number]      Impression/Recommendations:  Chronic osteomyelitis of the right 6 through 8 ribs with pathologic fracture.  Etiology of osteomyelitis is chronically nonhealing right chest wall decubitus ulcer.  This has been a chronic infection, without any acute exacerbation.  There is no evidence of cellulitis clinically.  Patient has no fever or leukocytosis.  In order to achieve cure, patient will need extensive surgery, with resection of all/most of infected bone, followed by flap closure of the right chest wall wound and long-term IV antibiotic.  Without evidence of acute infection, there is no indication for antibiotic at present.  Patient will need bone culture at the time of bone resection and flap closure to help guide long-term antibiotic course.  Antibiotic prior to this surgery would adversely affect bone culture.  Furthermore, given large and chronically open wound, any antibiotic would not be efficacious until this wound is closed.  Antibiotic was started for possible cellulitis, as in below.  Will complete a brief course of antibiotic for presumptive cellulitis, then discontinue.  Antibiotic plan for possible cellulitis, as in below.  Will discontinue antibiotic after brief course.  Flap closure per plastic surgery.  If patient is a candidate for flap closure, he will likely need resection of infected rib and bone culture.  After flap closure, patient will need long-term IV antibiotic, guided by bone culture result.     Fever overnight 1/18.  Patient had brief hypotension with it.  Vancomycin/ceftriaxone was started.  Patient has no chills and was not even aware of his fever.  Given chronicity of his wound, I suspect that his intermittent fever may also have been chronic.  There is no obvious active acute infection, except for possible chest wall cellulitis.  Recent wound culture had  growth of GBS.  Antibiotic was de-escalated to cefazolin.  Patient remains well, without further fever.  Blood cultures no growth.  COVID screen negative.  Continue IV cefazolin.  Monitor temperature/WBC.  Follow-up blood cultures.  Will likely keep antibiotic regimen short for possible soft tissue infection.     Chronic and progressing right chest wall decubitus ulcer, likely secondary to lack of padding of the wheelchair.  In order for flap closure to be successful, patient will need to offload his chest wall as much as possible.  Otherwise, with ongoing pressure at the chest wall after flap reconstruction, probability of flap failure will be very high.  I discussed with patient in great detail regarding this.  Continue local wound care for now.  Aggressive offloading of right chest wall.     4. Longstanding paraplegia, wheelchair-bound, with multiple prior decubitus ulcers, including right lower leg resulting in right AKA.     5. DM, reasonably well-controlled.  This contributes to poor wound healing and infection above.  Management per primary service.     Discussed with patient in detail regarding the above plan.    Antibiotics:  Cefazolin # 2    Subjective:  Patient feels well.  Minimal pain in right chest wall.  No respiratory symptoms.  Temperature stays down.  No chills.  He is tolerating antibiotic well.  No nausea, vomiting or diarrhea.    Objective:  Vitals:  Temp:  [98.2 °F (36.8 °C)-99.1 °F (37.3 °C)] 99.1 °F (37.3 °C)  HR:  [76-85] 76  Resp:  [16-20] 16  BP: (102-110)/(66-71) 110/71  SpO2:  [94 %-99 %] 94 %  Temp (24hrs), Av.8 °F (37.1 °C), Min:98.2 °F (36.8 °C), Max:99.1 °F (37.3 °C)  Current: Temperature: 99.1 °F (37.3 °C)    Physical Exam:     General: Awake, alert, cooperative, no distress.   Neck:  Supple. No mass.  No lymphadenopathy.   Chest:  Right chest wall wound packed.  No purulence.  No erythema/warmth.  No fluctuance.  Minimal tenderness.   Lungs: Expansion symmetric, no rales, no  wheezing, respirations unlabored.   Heart:  Regular rate and rhythm, S1 and S2 normal, no murmur.   Abdomen: Soft, nondistended, non-tender, bowel sounds active all four quadrants, no masses, no organomegaly.   Extremities: No edema. No erythema/warmth. No draining ulcer. Nontender to palpation.   Skin:  No rash.   Neuro: Stable paraplegia.     Invasive Devices       Peripheral Intravenous Line  Duration             Peripheral IV 01/19/24 Distal;Left;Upper;Ventral (anterior) Arm <1 day              Drain  Duration             Colostomy LLQ 2 days    Suprapubic Catheter 2 days                    Labs studies:   I have personally reviewed pertinent labs.  Results from last 7 days   Lab Units 01/20/24  0515 01/19/24  0457 01/18/24  1024 01/17/24  1147   POTASSIUM mmol/L 3.9 3.3* 3.6 4.6   CHLORIDE mmol/L 104 106 100 106   CO2 mmol/L 24 23 26 23   BUN mg/dL 12 15 19 13   CREATININE mg/dL 0.34* 0.42* 0.44* 0.36*   EGFR ml/min/1.73sq m 135 123 121 132   CALCIUM mg/dL 8.1* 8.3* 8.9 9.0   AST U/L  --   --  11* 17   ALT U/L  --   --  7 7   ALK PHOS U/L  --   --  77 76     Results from last 7 days   Lab Units 01/20/24  0515 01/19/24  0457 01/18/24  0615   WBC Thousand/uL 4.03* 3.61* 6.29   HEMOGLOBIN g/dL 9.2* 9.5* 11.0*   PLATELETS Thousands/uL 325 326 386     Results from last 7 days   Lab Units 01/18/24  2305 01/18/24  1024 01/17/24  1747 01/17/24  1147   BLOOD CULTURE  No Growth at 24 hrs.  No Growth at 24 hrs. No Growth at 24 hrs.  --  No Growth at 48 hrs.   GRAM STAIN RESULT   --   --  2+ Gram positive cocci in pairs and chains*  No polys seen*  --    WOUND CULTURE   --   --  4+ Growth of Beta Hemolytic Streptococcus Group G*  3+ Growth of  --        Imaging Studies:   I have personally reviewed pertinent imaging study reports and images in PACS.    EKG, Pathology, and Other Studies:   I have personally reviewed pertinent reports.

## 2024-01-20 NOTE — PLAN OF CARE
Problem: Potential for Falls  Goal: Patient will remain free of falls  Description: INTERVENTIONS:  - Educate patient/family on patient safety including physical limitations  - Instruct patient to call for assistance with activity   - Consult OT/PT to assist with strengthening/mobility   - Keep Call bell within reach  - Keep bed low and locked with side rails adjusted as appropriate  - Keep care items and personal belongings within reach  - Initiate and maintain comfort rounds  - Make Fall Risk Sign visible to staff  - Offer Toileting every 2 Hours, in advance of need  - Initiate/Maintain bed alarm  - Obtain necessary fall risk management equipment: bed alarm  - Apply yellow socks and bracelet for high fall risk patients  - Consider moving patient to room near nurses station  Outcome: Progressing     Problem: PAIN - ADULT  Goal: Verbalizes/displays adequate comfort level or baseline comfort level  Description: Interventions:  - Encourage patient to monitor pain and request assistance  - Assess pain using appropriate pain scale  - Administer analgesics based on type and severity of pain and evaluate response  - Implement non-pharmacological measures as appropriate and evaluate response  - Consider cultural and social influences on pain and pain management  - Notify physician/advanced practitioner if interventions unsuccessful or patient reports new pain  Outcome: Progressing     Problem: INFECTION - ADULT  Goal: Absence or prevention of progression during hospitalization  Description: INTERVENTIONS:  - Assess and monitor for signs and symptoms of infection  - Monitor lab/diagnostic results  - Monitor all insertion sites, i.e. indwelling lines, tubes, and drains  - Monitor endotracheal if appropriate and nasal secretions for changes in amount and color  - [place] appropriate cooling/warming therapies per order  - Administer medications as ordered  - Instruct and encourage patient and family to use good hand  hygiene technique  - Identify and instruct in appropriate isolation precautions for identified infection/condition  Outcome: Progressing  Goal: Absence of fever/infection during neutropenic period  Description: INTERVENTIONS:  - Monitor WBC    Outcome: Progressing     Problem: SAFETY ADULT  Goal: Patient will remain free of falls  Description: INTERVENTIONS:  - Educate patient/family on patient safety including physical limitations  - Instruct patient to call for assistance with activity   - Consult OT/PT to assist with strengthening/mobility   - Keep Call bell within reach  - Keep bed low and locked with side rails adjusted as appropriate  - Keep care items and personal belongings within reach  - Initiate and maintain comfort rounds  - Make Fall Risk Sign visible to staff  - Offer Toileting every 2 Hours, in advance of need  - Initiate/Maintain bed alarm  - Obtain necessary fall risk management equipment: bed alarm  - Apply yellow socks and bracelet for high fall risk patients  - Consider moving patient to room near nurses station  Outcome: Progressing  Goal: Maintain or return to baseline ADL function  Description: INTERVENTIONS:  -  Assess patient's ability to carry out ADLs; assess patient's baseline for ADL function and identify physical deficits which impact ability to perform ADLs (bathing, care of mouth/teeth, toileting, grooming, dressing, etc.)  - Assess/evaluate cause of self-care deficits   - Assess range of motion  - Assess patient's mobility; develop plan if impaired  - Assess patient's need for assistive devices and provide as appropriate  - Encourage maximum independence but intervene and supervise when necessary  - Involve family in performance of ADLs  - Assess for home care needs following discharge   - Consider OT consult to assist with ADL evaluation and planning for discharge  - Provide patient education as appropriate  Outcome: Progressing  Goal: Maintains/Returns to pre admission functional  level  Description: INTERVENTIONS:  - Perform AM-PAC 6 Click Basic Mobility/ Daily Activity assessment daily.  - Set and communicate daily mobility goal to care team and patient/family/caregiver.   - Collaborate with rehabilitation services on mobility goals if consulted  - Perform Range of Motion 3 times a day.  - Reposition patient every 2 hours.  - Dangle patient 3 times a day  - Out of bed to chair 3 times a day   - Out of bed for meals 3 times a day  - Out of bed for toileting  - Record patient progress and toleration of activity level   Outcome: Progressing     Problem: DISCHARGE PLANNING  Goal: Discharge to home or other facility with appropriate resources  Description: INTERVENTIONS:  - Identify barriers to discharge w/patient and caregiver  - Arrange for needed discharge resources and transportation as appropriate  - Identify discharge learning needs (meds, wound care, etc.)  - Arrange for interpretive services to assist at discharge as needed  - Refer to Case Management Department for coordinating discharge planning if the patient needs post-hospital services based on physician/advanced practitioner order or complex needs related to functional status, cognitive ability, or social support system  Outcome: Progressing     Problem: Knowledge Deficit  Goal: Patient/family/caregiver demonstrates understanding of disease process, treatment plan, medications, and discharge instructions  Description: Complete learning assessment and assess knowledge base.  Interventions:  - Provide teaching at level of understanding  - Provide teaching via preferred learning methods  Outcome: Progressing     Problem: Prexisting or High Potential for Compromised Skin Integrity  Goal: Skin integrity is maintained or improved  Description: INTERVENTIONS:  - Identify patients at risk for skin breakdown  - Assess and monitor skin integrity  - Assess and monitor nutrition and hydration status  - Monitor labs   - Assess for incontinence    - Turn and reposition patient  - Assist with mobility/ambulation  - Relieve pressure over bony prominences  - Avoid friction and shearing  - Provide appropriate hygiene as needed including keeping skin clean and dry  - Evaluate need for skin moisturizer/barrier cream  - Collaborate with interdisciplinary team   - Patient/family teaching  - Consider wound care consult   Outcome: Progressing     Problem: Nutrition/Hydration-ADULT  Goal: Nutrient/Hydration intake appropriate for improving, restoring or maintaining nutritional needs  Description: Monitor and assess patient's nutrition/hydration status for malnutrition. Collaborate with interdisciplinary team and initiate plan and interventions as ordered.  Monitor patient's weight and dietary intake as ordered or per policy. Utilize nutrition screening tool and intervene as necessary. Determine patient's food preferences and provide high-protein, high-caloric foods as appropriate.     INTERVENTIONS:  - Monitor oral intake, urinary output, labs, and treatment plans  - Assess nutrition and hydration status and recommend course of action  - Evaluate amount of meals eaten  - Assist patient with eating if necessary   - Allow adequate time for meals  - Recommend/ encourage appropriate diets, oral nutritional supplements, and vitamin/mineral supplements  - Order, calculate, and assess calorie counts as needed  - Recommend, monitor, and adjust tube feedings and TPN/PPN based on assessed needs  - Assess need for intravenous fluids  - Provide specific nutrition/hydration education as appropriate  - Include patient/family/caregiver in decisions related to nutrition  Outcome: Progressing

## 2024-01-20 NOTE — PROGRESS NOTES
"Thoracic Surgery  Progress Note   Rikki Farooqcat 64 y.o. male MRN: [id number]  Unit/Bed#: [facility] 823-01 Encounter: [id number]    Assessment:  64 y.o. male who presents with a right chest wall wound. Concerns for osteomyelitis.     Afebrile, soft BP 93/60, MAP 71, 98% on room air  UOP: 1200 cc p.m. shift    WBC 4.72 from 4.03  Hgb 10.0 from 9.2  Plts 295 from 325  Cr pending from 0.34     Wound Culture: Beta hemolytic streptococcus group G   repeat blood cx NG x24hr    Plan:  -regular diet,   -Ensures for supplemental nutrition  -Continue Ancef, ID on board, appreciate recs, follow cx  -Coordination w/ Plastics for OR timing, possibly   -Daily packing changes to R chest wall  -SQH  -incentive spirometry  -encourage ambulation  -pain control      Subjective/Objective     Subjective:   Patient seen and examined at bedside, in no acute distress. No acute events overnight.  Patient reports some pain near his wound.  Patient denies nausea vomiting fever chills chest pain or shortness of breath.  Patient is tolerating his diet.    Objective:   Vitals:Blood pressure 93/60, pulse 75, temperature 99.1 °F (37.3 °C), resp. rate 16, height 5' 7\" (1.702 m), weight 65.8 kg (145 lb), SpO2 98%.  Temp (24hrs), Av.1 °F (37.3 °C), Min:99.1 °F (37.3 °C), Max:99.1 °F (37.3 °C)      I/O          0701   0700  0701   0700  0701   0700    P.O.  410 290    Total Intake(mL/kg)  410 (6.2) 290 (4.4)    Urine (mL/kg/hr) 1000 (0.6) 2125 (1.3)     Stool 200 0     Total Output 1200 2125     Net -1200 -1715 +290           Unmeasured Stool Occurrence  1 x             Invasive Devices       Peripheral Intravenous Line  Duration             Peripheral IV 24 Distal;Left;Upper;Ventral (anterior) Arm 1 day    Peripheral IV 24 Proximal;Right;Ventral (anterior) Forearm <1 day              Drain  Duration             Colostomy LLQ 3 days    Suprapubic Catheter 3 days                    Physical " Exam:  General: No acute distress  Neuro: Awake, Alert and Oriented x 3  HEENT:  Normocephalic, atraumatic, moist mucous membranes  CV: Regular rate and rhythm  Lungs: Normal work of breathing, no increased respiratory effort  Chest: R chest wall wound w/ packing replaced, nontender and no fluctuance  Abdomen: Soft, non-tender, non-distended  Extremities: No edema, clubbing or cyanosis  Skin: Warm, dry        Lab Results   Component Value Date    WBC 4.72 01/21/2024    HGB 10.0 (L) 01/21/2024    HCT 35.6 (L) 01/21/2024    MCV 81 (L) 01/21/2024     01/21/2024      Lab Results   Component Value Date     02/17/2014    SODIUM 135 01/20/2024    K 3.9 01/20/2024     01/20/2024    CO2 24 01/20/2024    ANIONGAP 8 02/17/2014    AGAP 7 01/20/2024    BUN 12 01/20/2024    CREATININE 0.34 (L) 01/20/2024    GLUC 97 01/20/2024    GLUF 62 (L) 08/29/2023    CALCIUM 8.1 (L) 01/20/2024    AST 11 (L) 01/18/2024    ALT 7 01/18/2024    ALKPHOS 77 01/18/2024    PROT 8.0 02/17/2014    TP 8.6 (H) 01/18/2024    BILITOT 0.3 02/17/2014    TBILI 0.39 01/18/2024    EGFR 135 01/20/2024       VTE Prophylaxis: Heparin        Akhil Mills MD  1/21/2024  6:50 AM

## 2024-01-20 NOTE — PROGRESS NOTES
"Progress Note - Thoracic Surgery   Rikki H Moscat 64 y.o. male MRN: [id number]  Unit/Bed#: [facility] 823-01 Encounter: [id number]    Assessment:  64 y.o. male who presents with a right chest wall wound. Concerns for osteomyelitis.    Vitals stable, afebrile  WBC 4.03 from 3.61  Cr 0.34  1/17 Wound Culture: Beta hemolytic streptococcus group G  1/19 repeat blood cx NG x24hr    UOP 2125    Plan:  -reg, ensures  -on ancef, ID on board, appreciate recs, follow cx  -coordination w plastics for OR timing/etc  -Daily packing changes to R chest wall  -SQH  -incentive spirometry  -encourage ambulation  -pain control    Subjective/Objective   Subjective:   Doing well, minimal pain, tolerating diet.    Objective:     Blood pressure 103/66, pulse 84, temperature 99 °F (37.2 °C), temperature source Oral, resp. rate 20, height 5' 7\" (1.702 m), weight 65.8 kg (145 lb), SpO2 95%.,Body mass index is 22.71 kg/m².      Intake/Output Summary (Last 24 hours) at 1/20/2024 0738  Last data filed at 1/20/2024 0517  Gross per 24 hour   Intake 410 ml   Output 2125 ml   Net -1715 ml       Invasive Devices       Peripheral Intravenous Line  Duration             Peripheral IV 01/19/24 Distal;Left;Upper;Ventral (anterior) Arm <1 day              Drain  Duration             Colostomy LLQ 2 days    Suprapubic Catheter 2 days                    Physical Exam:   General: NAD  Skin: Warm, dry, anicteric, R chest wall wound w packing  HEENT: Normocephalic, atraumatic  CV: RRR, no m/r/g  Pulm: CTA b/l, no inc WOB  Abd: Soft, ND/NT  MSK: RLE amputation  Neuro: AOx3, GCS 15     Lab, Imaging and other studies:I have personally reviewed pertinent lab results.  , CBC:   Lab Results   Component Value Date    WBC 4.03 (L) 01/20/2024    HGB 9.2 (L) 01/20/2024    HCT 31.7 (L) 01/20/2024    MCV 80 (L) 01/20/2024     01/20/2024    RBC 3.98 01/20/2024    MCH 23.1 (L) 01/20/2024    MCHC 29.0 (L) 01/20/2024    RDW 20.8 (H) 01/20/2024    MPV 9.4 01/20/2024    NRBC " 0 01/20/2024   , CMP:   Lab Results   Component Value Date    SODIUM 135 01/20/2024    K 3.9 01/20/2024     01/20/2024    CO2 24 01/20/2024    BUN 12 01/20/2024    CREATININE 0.34 (L) 01/20/2024    CALCIUM 8.1 (L) 01/20/2024    EGFR 135 01/20/2024     VTE Pharmacologic Prophylaxis: Heparin  VTE Mechanical Prophylaxis: sequential compression device

## 2024-01-21 LAB
BASOPHILS # BLD AUTO: 0.02 THOUSANDS/ÂΜL (ref 0–0.1)
BASOPHILS NFR BLD AUTO: 0 % (ref 0–1)
EOSINOPHIL # BLD AUTO: 0.2 THOUSAND/ÂΜL (ref 0–0.61)
EOSINOPHIL NFR BLD AUTO: 4 % (ref 0–6)
ERYTHROCYTE [DISTWIDTH] IN BLOOD BY AUTOMATED COUNT: 20.7 % (ref 11.6–15.1)
GLUCOSE SERPL-MCNC: 82 MG/DL (ref 65–140)
GLUCOSE SERPL-MCNC: 84 MG/DL (ref 65–140)
GLUCOSE SERPL-MCNC: 90 MG/DL (ref 65–140)
GLUCOSE SERPL-MCNC: 94 MG/DL (ref 65–140)
HCT VFR BLD AUTO: 35.6 % (ref 36.5–49.3)
HGB BLD-MCNC: 10 G/DL (ref 12–17)
IMM GRANULOCYTES # BLD AUTO: 0.01 THOUSAND/UL (ref 0–0.2)
IMM GRANULOCYTES NFR BLD AUTO: 0 % (ref 0–2)
LYMPHOCYTES # BLD AUTO: 1.58 THOUSANDS/ÂΜL (ref 0.6–4.47)
LYMPHOCYTES NFR BLD AUTO: 34 % (ref 14–44)
MCH RBC QN AUTO: 22.8 PG (ref 26.8–34.3)
MCHC RBC AUTO-ENTMCNC: 28.1 G/DL (ref 31.4–37.4)
MCV RBC AUTO: 81 FL (ref 82–98)
MONOCYTES # BLD AUTO: 0.54 THOUSAND/ÂΜL (ref 0.17–1.22)
MONOCYTES NFR BLD AUTO: 11 % (ref 4–12)
NEUTROPHILS # BLD AUTO: 2.37 THOUSANDS/ÂΜL (ref 1.85–7.62)
NEUTS SEG NFR BLD AUTO: 51 % (ref 43–75)
NRBC BLD AUTO-RTO: 0 /100 WBCS
PLATELET # BLD AUTO: 295 THOUSANDS/UL (ref 149–390)
PMV BLD AUTO: 9.7 FL (ref 8.9–12.7)
RBC # BLD AUTO: 4.38 MILLION/UL (ref 3.88–5.62)
WBC # BLD AUTO: 4.72 THOUSAND/UL (ref 4.31–10.16)

## 2024-01-21 PROCEDURE — 93005 ELECTROCARDIOGRAM TRACING: CPT

## 2024-01-21 PROCEDURE — 99232 SBSQ HOSP IP/OBS MODERATE 35: CPT | Performed by: THORACIC SURGERY (CARDIOTHORACIC VASCULAR SURGERY)

## 2024-01-21 PROCEDURE — 99232 SBSQ HOSP IP/OBS MODERATE 35: CPT | Performed by: INTERNAL MEDICINE

## 2024-01-21 PROCEDURE — 85025 COMPLETE CBC W/AUTO DIFF WBC: CPT

## 2024-01-21 PROCEDURE — 82948 REAGENT STRIP/BLOOD GLUCOSE: CPT

## 2024-01-21 RX ORDER — SODIUM CHLORIDE, SODIUM GLUCONATE, SODIUM ACETATE, POTASSIUM CHLORIDE, MAGNESIUM CHLORIDE, SODIUM PHOSPHATE, DIBASIC, AND POTASSIUM PHOSPHATE .53; .5; .37; .037; .03; .012; .00082 G/100ML; G/100ML; G/100ML; G/100ML; G/100ML; G/100ML; G/100ML
1000 INJECTION, SOLUTION INTRAVENOUS ONCE
Status: COMPLETED | OUTPATIENT
Start: 2024-01-21 | End: 2024-01-21

## 2024-01-21 RX ADMIN — HEPARIN SODIUM 5000 UNITS: 5000 INJECTION INTRAVENOUS; SUBCUTANEOUS at 13:48

## 2024-01-21 RX ADMIN — HYDROMORPHONE HYDROCHLORIDE 0.5 MG: 1 INJECTION, SOLUTION INTRAMUSCULAR; INTRAVENOUS; SUBCUTANEOUS at 00:11

## 2024-01-21 RX ADMIN — ACETAMINOPHEN 650 MG: 325 TABLET, FILM COATED ORAL at 21:28

## 2024-01-21 RX ADMIN — Medication 250 MG: at 08:11

## 2024-01-21 RX ADMIN — SODIUM CHLORIDE, SODIUM GLUCONATE, SODIUM ACETATE, POTASSIUM CHLORIDE, MAGNESIUM CHLORIDE, SODIUM PHOSPHATE, DIBASIC, AND POTASSIUM PHOSPHATE 1000 ML: .53; .5; .37; .037; .03; .012; .00082 INJECTION, SOLUTION INTRAVENOUS at 01:03

## 2024-01-21 RX ADMIN — CEFAZOLIN SODIUM 1000 MG: 1 SOLUTION INTRAVENOUS at 21:29

## 2024-01-21 RX ADMIN — PANTOPRAZOLE SODIUM 40 MG: 40 TABLET, DELAYED RELEASE ORAL at 06:20

## 2024-01-21 RX ADMIN — OXYCODONE HYDROCHLORIDE 10 MG: 10 TABLET ORAL at 14:53

## 2024-01-21 RX ADMIN — CEFAZOLIN SODIUM 1000 MG: 1 SOLUTION INTRAVENOUS at 13:55

## 2024-01-21 RX ADMIN — ASPIRIN 81 MG: 81 TABLET, COATED ORAL at 08:11

## 2024-01-21 RX ADMIN — HEPARIN SODIUM 5000 UNITS: 5000 INJECTION INTRAVENOUS; SUBCUTANEOUS at 21:28

## 2024-01-21 RX ADMIN — HEPARIN SODIUM 5000 UNITS: 5000 INJECTION INTRAVENOUS; SUBCUTANEOUS at 06:20

## 2024-01-21 RX ADMIN — OXYCODONE HYDROCHLORIDE 10 MG: 10 TABLET ORAL at 10:57

## 2024-01-21 RX ADMIN — CEFAZOLIN SODIUM 1000 MG: 1 SOLUTION INTRAVENOUS at 04:54

## 2024-01-21 RX ADMIN — OXYCODONE HYDROCHLORIDE 10 MG: 10 TABLET ORAL at 21:28

## 2024-01-21 RX ADMIN — HYDROMORPHONE HYDROCHLORIDE 0.5 MG: 1 INJECTION, SOLUTION INTRAMUSCULAR; INTRAVENOUS; SUBCUTANEOUS at 13:55

## 2024-01-21 RX ADMIN — Medication 250 MG: at 17:28

## 2024-01-21 RX ADMIN — HYDROMORPHONE HYDROCHLORIDE 0.5 MG: 1 INJECTION, SOLUTION INTRAMUSCULAR; INTRAVENOUS; SUBCUTANEOUS at 17:50

## 2024-01-21 RX ADMIN — OXYCODONE HYDROCHLORIDE 10 MG: 10 TABLET ORAL at 06:20

## 2024-01-21 NOTE — PROGRESS NOTES
Progress Note - Infectious Disease   Rikki Arguello 64 y.o. male MRN: [id number]  Unit/Bed#: [facility] 823-01 Encounter: [id number]      Impression/Recommendations:  Chronic osteomyelitis of the right 6 through 8 ribs with pathologic fracture.  Etiology of osteomyelitis is chronically nonhealing right chest wall decubitus ulcer.  This has been a chronic infection, without any acute exacerbation.  There is no evidence of cellulitis clinically.  Patient has no fever or leukocytosis.  In order to achieve cure, patient will need extensive surgery, with resection of all/most of infected bone, followed by flap closure of the right chest wall wound and long-term IV antibiotic.  Without evidence of acute infection, there is no indication for antibiotic at present.  Patient will need bone culture at the time of bone resection and flap closure to help guide long-term antibiotic course.  Antibiotic prior to this surgery would adversely affect bone culture.  Furthermore, given large and chronically open wound, any antibiotic would not be efficacious until this wound is closed.  Antibiotic was started for possible cellulitis, as in below.  Will complete a brief course of antibiotic for presumptive cellulitis, then discontinue.  Antibiotic plan for possible cellulitis, as in below.    Flap closure per plastic surgery.  If patient is a candidate for flap closure, he will likely need resection of infected rib and bone culture.  After flap closure, patient will need long-term IV antibiotic, guided by bone culture result.     Fever overnight 1/18.  Patient had brief hypotension with it.  Vancomycin/ceftriaxone was started.  Patient has no chills and was not even aware of his fever.  Given chronicity of his wound, I suspect that his intermittent fever may also have been chronic.  There is no obvious active acute infection, except for possible chest wall cellulitis.  Recent wound culture had growth of GGS.  Antibiotic was de-escalated to  cefazolin.  Patient remains well, without further fever.  Blood cultures no growth.  COVID screen negative.  Continue IV cefazolin.  Monitor temperature/WBC.  Follow-up blood cultures.  Will likely keep antibiotic regimen short for possible soft tissue infection.     Chronic and progressing right chest wall decubitus ulcer, likely secondary to lack of padding of the wheelchair.  In order for flap closure to be successful, patient will need to offload his chest wall as much as possible.  Otherwise, with ongoing pressure at the chest wall after flap reconstruction, probability of flap failure will be very high.  I discussed with patient in great detail regarding this.  Continue local wound care for now.  Aggressive offloading of right chest wall.     4. Longstanding paraplegia, wheelchair-bound, with multiple prior decubitus ulcers, including right lower leg resulting in right AKA.     5. DM, reasonably well-controlled.  This contributes to poor wound healing and infection above.  Management per primary service.     Discussed with patient in detail regarding the above plan.     Antibiotics:  Cefazolin # 3     Subjective:  Patient feels well.  Minimal pain in right chest wall.  No respiratory symptoms.  Temperature stays down.  No chills.  He is tolerating antibiotic well.  No nausea, vomiting or diarrhea.       Objective:  Vitals:  Temp:  [98.2 °F (36.8 °C)-99.1 °F (37.3 °C)] 98.5 °F (36.9 °C)  HR:  [] 67  Resp:  [16] 16  BP: ()/(58-84) 98/58  SpO2:  [94 %-98 %] 94 %  Temp (24hrs), Av.6 °F (37 °C), Min:98.2 °F (36.8 °C), Max:99.1 °F (37.3 °C)  Current: Temperature: 98.5 °F (36.9 °C)    Physical Exam:     General: Awake, alert, cooperative, no distress.   Neck:  Supple. No mass.  No lymphadenopathy.   Chest:  Stable right chest wall wound.  No purulence.  No erythema/warmth.  Minimal tenderness.   Lungs: Expansion symmetric, no rales, no wheezing, respirations unlabored.   Heart:  Regular rate and  rhythm, S1 and S2 normal, no murmur.   Abdomen: Soft, nondistended, non-tender, bowel sounds active all four quadrants, no masses, no organomegaly.   Extremities: No edema. No erythema/warmth. No ulcer. Nontender to palpation.   Skin:  No rash.   Neuro: Moves all extremities.     Invasive Devices       Peripheral Intravenous Line  Duration             Peripheral IV 01/21/24 Proximal;Right;Ventral (anterior) Forearm <1 day              Drain  Duration             Colostomy LLQ 3 days    Suprapubic Catheter 3 days                    Labs studies:   I have personally reviewed pertinent labs.  Results from last 7 days   Lab Units 01/20/24  0515 01/19/24  0457 01/18/24  1024 01/17/24  1147   POTASSIUM mmol/L 3.9 3.3* 3.6 4.6   CHLORIDE mmol/L 104 106 100 106   CO2 mmol/L 24 23 26 23   BUN mg/dL 12 15 19 13   CREATININE mg/dL 0.34* 0.42* 0.44* 0.36*   EGFR ml/min/1.73sq m 135 123 121 132   CALCIUM mg/dL 8.1* 8.3* 8.9 9.0   AST U/L  --   --  11* 17   ALT U/L  --   --  7 7   ALK PHOS U/L  --   --  77 76     Results from last 7 days   Lab Units 01/21/24  0449 01/20/24  0515 01/19/24  0457   WBC Thousand/uL 4.72 4.03* 3.61*   HEMOGLOBIN g/dL 10.0* 9.2* 9.5*   PLATELETS Thousands/uL 295 325 326     Results from last 7 days   Lab Units 01/18/24  2305 01/18/24  1024 01/17/24  1747 01/17/24  1147   BLOOD CULTURE  No Growth at 48 hrs.  No Growth at 48 hrs. No Growth at 72 hrs.  --  No Growth After 4 Days.   GRAM STAIN RESULT   --   --  2+ Gram positive cocci in pairs and chains*  No polys seen*  --    WOUND CULTURE   --   --  4+ Growth of Beta Hemolytic Streptococcus Group G*  3+ Growth of  --        Imaging Studies:   I have personally reviewed pertinent imaging study reports and images in PACS.    EKG, Pathology, and Other Studies:   I have personally reviewed pertinent reports.

## 2024-01-21 NOTE — PLAN OF CARE
Problem: PAIN - ADULT  Goal: Verbalizes/displays adequate comfort level or baseline comfort level  Description: Interventions:  - Encourage patient to monitor pain and request assistance  - Assess pain using appropriate pain scale  - Administer analgesics based on type and severity of pain and evaluate response  - Implement non-pharmacological measures as appropriate and evaluate response  - Consider cultural and social influences on pain and pain management  - Notify physician/advanced practitioner if interventions unsuccessful or patient reports new pain  Outcome: Progressing     Problem: INFECTION - ADULT  Goal: Absence or prevention of progression during hospitalization  Description: INTERVENTIONS:  - Assess and monitor for signs and symptoms of infection  - Monitor lab/diagnostic results  - Monitor all insertion sites, i.e. indwelling lines, tubes, and drains  - Monitor endotracheal if appropriate and nasal secretions for changes in amount and color  - [place] appropriate cooling/warming therapies per order  - Administer medications as ordered  - Instruct and encourage patient and family to use good hand hygiene technique  - Identify and instruct in appropriate isolation precautions for identified infection/condition  Outcome: Progressing     Problem: Prexisting or High Potential for Compromised Skin Integrity  Goal: Skin integrity is maintained or improved  Description: INTERVENTIONS:  - Identify patients at risk for skin breakdown  - Assess and monitor skin integrity  - Assess and monitor nutrition and hydration status  - Monitor labs   - Assess for incontinence   - Turn and reposition patient  - Assist with mobility/ambulation  - Relieve pressure over bony prominences  - Avoid friction and shearing  - Provide appropriate hygiene as needed including keeping skin clean and dry  - Evaluate need for skin moisturizer/barrier cream  - Collaborate with interdisciplinary team   - Patient/family teaching  -  Consider wound care consult   Outcome: Progressing

## 2024-01-21 NOTE — PLAN OF CARE
Problem: Potential for Falls  Goal: Patient will remain free of falls  Description: INTERVENTIONS:  - Educate patient/family on patient safety including physical limitations  - Instruct patient to call for assistance with activity   - Consult OT/PT to assist with strengthening/mobility   - Keep Call bell within reach  - Keep bed low and locked with side rails adjusted as appropriate  - Keep care items and personal belongings within reach  - Initiate and maintain comfort rounds  - Make Fall Risk Sign visible to staff  - Apply yellow socks and bracelet for high fall risk patients  - Consider moving patient to room near nurses station  Outcome: Progressing     Problem: PAIN - ADULT  Goal: Verbalizes/displays adequate comfort level or baseline comfort level  Description: Interventions:  - Encourage patient to monitor pain and request assistance  - Assess pain using appropriate pain scale  - Administer analgesics based on type and severity of pain and evaluate response  - Implement non-pharmacological measures as appropriate and evaluate response  - Consider cultural and social influences on pain and pain management  - Notify physician/advanced practitioner if interventions unsuccessful or patient reports new pain  Outcome: Progressing     Problem: INFECTION - ADULT  Goal: Absence or prevention of progression during hospitalization  Description: INTERVENTIONS:  - Assess and monitor for signs and symptoms of infection  - Monitor lab/diagnostic results  - Monitor all insertion sites, i.e. indwelling lines, tubes, and drains  - Monitor endotracheal if appropriate and nasal secretions for changes in amount and color  - [place] appropriate cooling/warming therapies per order  - Administer medications as ordered  - Instruct and encourage patient and family to use good hand hygiene technique  - Identify and instruct in appropriate isolation precautions for identified infection/condition  Outcome: Progressing  Goal: Absence  of fever/infection during neutropenic period  Description: INTERVENTIONS:  - Monitor WBC    Outcome: Progressing     Problem: SAFETY ADULT  Goal: Patient will remain free of falls  Description: INTERVENTIONS:  - Educate patient/family on patient safety including physical limitations  - Instruct patient to call for assistance with activity   - Consult OT/PT to assist with strengthening/mobility   - Keep Call bell within reach  - Keep bed low and locked with side rails adjusted as appropriate  - Keep care items and personal belongings within reach  - Initiate and maintain comfort rounds  - Make Fall Risk Sign visible to staff  - Apply yellow socks and bracelet for high fall risk patients  - Consider moving patient to room near nurses station  Outcome: Progressing  Goal: Maintain or return to baseline ADL function  Description: INTERVENTIONS:  -  Assess patient's ability to carry out ADLs; assess patient's baseline for ADL function and identify physical deficits which impact ability to perform ADLs (bathing, care of mouth/teeth, toileting, grooming, dressing, etc.)  - Assess/evaluate cause of self-care deficits   - Assess range of motion  - Assess patient's mobility; develop plan if impaired  - Assess patient's need for assistive devices and provide as appropriate  - Encourage maximum independence but intervene and supervise when necessary  - Involve family in performance of ADLs  - Assess for home care needs following discharge   - Consider OT consult to assist with ADL evaluation and planning for discharge  - Provide patient education as appropriate  Outcome: Progressing  Goal: Maintains/Returns to pre admission functional level  Description: INTERVENTIONS:  - Perform AM-PAC 6 Click Basic Mobility/ Daily Activity assessment daily.  - Set and communicate daily mobility goal to care team and patient/family/caregiver.   - Collaborate with rehabilitation services on mobility goals if consulted  - Perform Range of Motion  3 times a day.  - Reposition patient every 2 hours.     Problem: DISCHARGE PLANNING  Goal: Discharge to home or other facility with appropriate resources  Description: INTERVENTIONS:  - Identify barriers to discharge w/patient and caregiver  - Arrange for needed discharge resources and transportation as appropriate  - Identify discharge learning needs (meds, wound care, etc.)  - Arrange for interpretive services to assist at discharge as needed  - Refer to Case Management Department for coordinating discharge planning if the patient needs post-hospital services based on physician/advanced practitioner order or complex needs related to functional status, cognitive ability, or social support system  Outcome: Progressing     Problem: Knowledge Deficit  Goal: Patient/family/caregiver demonstrates understanding of disease process, treatment plan, medications, and discharge instructions  Description: Complete learning assessment and assess knowledge base.  Interventions:  - Provide teaching at level of understanding  - Provide teaching via preferred learning methods  Outcome: Progressing     Problem: Prexisting or High Potential for Compromised Skin Integrity  Goal: Skin integrity is maintained or improved  Description: INTERVENTIONS:  - Identify patients at risk for skin breakdown  - Assess and monitor skin integrity  - Assess and monitor nutrition and hydration status  - Monitor labs   - Assess for incontinence   - Turn and reposition patient  - Assist with mobility/ambulation  - Relieve pressure over bony prominences  - Avoid friction and shearing  - Provide appropriate hygiene as needed including keeping skin clean and dry  - Evaluate need for skin moisturizer/barrier cream  - Collaborate with interdisciplinary team   - Patient/family teaching  - Consider wound care consult   Outcome: Progressing     Problem: Nutrition/Hydration-ADULT  Goal: Nutrient/Hydration intake appropriate for improving, restoring or maintaining  nutritional needs  Description: Monitor and assess patient's nutrition/hydration status for malnutrition. Collaborate with interdisciplinary team and initiate plan and interventions as ordered.  Monitor patient's weight and dietary intake as ordered or per policy. Utilize nutrition screening tool and intervene as necessary. Determine patient's food preferences and provide high-protein, high-caloric foods as appropriate.     INTERVENTIONS:  - Monitor oral intake, urinary output, labs, and treatment plans  - Assess nutrition and hydration status and recommend course of action  - Evaluate amount of meals eaten  - Assist patient with eating if necessary   - Allow adequate time for meals  - Recommend/ encourage appropriate diets, oral nutritional supplements, and vitamin/mineral supplements  - Order, calculate, and assess calorie counts as needed  - Recommend, monitor, and adjust tube feedings and TPN/PPN based on assessed needs  - Assess need for intravenous fluids  - Provide specific nutrition/hydration education as appropriate  - Include patient/family/caregiver in decisions related to nutrition  Outcome: Progressing

## 2024-01-22 LAB
ANION GAP SERPL CALCULATED.3IONS-SCNC: 5 MMOL/L
ATRIAL RATE: 121 BPM
BACTERIA BLD CULT: NORMAL
BUN SERPL-MCNC: 17 MG/DL (ref 5–25)
CALCIUM SERPL-MCNC: 8.5 MG/DL (ref 8.4–10.2)
CHLORIDE SERPL-SCNC: 104 MMOL/L (ref 96–108)
CO2 SERPL-SCNC: 30 MMOL/L (ref 21–32)
CREAT SERPL-MCNC: 0.39 MG/DL (ref 0.6–1.3)
ERYTHROCYTE [DISTWIDTH] IN BLOOD BY AUTOMATED COUNT: 20.5 % (ref 11.6–15.1)
GFR SERPL CREATININE-BSD FRML MDRD: 127 ML/MIN/1.73SQ M
GLUCOSE SERPL-MCNC: 102 MG/DL (ref 65–140)
GLUCOSE SERPL-MCNC: 104 MG/DL (ref 65–140)
GLUCOSE SERPL-MCNC: 112 MG/DL (ref 65–140)
GLUCOSE SERPL-MCNC: 71 MG/DL (ref 65–140)
GLUCOSE SERPL-MCNC: 85 MG/DL (ref 65–140)
HCT VFR BLD AUTO: 32.1 % (ref 36.5–49.3)
HGB BLD-MCNC: 9.3 G/DL (ref 12–17)
MCH RBC QN AUTO: 22.9 PG (ref 26.8–34.3)
MCHC RBC AUTO-ENTMCNC: 29 G/DL (ref 31.4–37.4)
MCV RBC AUTO: 79 FL (ref 82–98)
P AXIS: 60 DEGREES
PLATELET # BLD AUTO: 345 THOUSANDS/UL (ref 149–390)
PMV BLD AUTO: 9.6 FL (ref 8.9–12.7)
POTASSIUM SERPL-SCNC: 3.9 MMOL/L (ref 3.5–5.3)
PR INTERVAL: 154 MS
QRS AXIS: 36 DEGREES
QRSD INTERVAL: 78 MS
QT INTERVAL: 316 MS
QTC INTERVAL: 448 MS
RBC # BLD AUTO: 4.06 MILLION/UL (ref 3.88–5.62)
SODIUM SERPL-SCNC: 139 MMOL/L (ref 135–147)
T WAVE AXIS: 52 DEGREES
VENTRICULAR RATE: 121 BPM
WBC # BLD AUTO: 3.81 THOUSAND/UL (ref 4.31–10.16)

## 2024-01-22 PROCEDURE — 80048 BASIC METABOLIC PNL TOTAL CA: CPT

## 2024-01-22 PROCEDURE — 93010 ELECTROCARDIOGRAM REPORT: CPT | Performed by: INTERNAL MEDICINE

## 2024-01-22 PROCEDURE — 85027 COMPLETE CBC AUTOMATED: CPT

## 2024-01-22 PROCEDURE — NC001 PR NO CHARGE: Performed by: INTERNAL MEDICINE

## 2024-01-22 PROCEDURE — 82948 REAGENT STRIP/BLOOD GLUCOSE: CPT

## 2024-01-22 PROCEDURE — 99232 SBSQ HOSP IP/OBS MODERATE 35: CPT | Performed by: THORACIC SURGERY (CARDIOTHORACIC VASCULAR SURGERY)

## 2024-01-22 PROCEDURE — 99232 SBSQ HOSP IP/OBS MODERATE 35: CPT | Performed by: INTERNAL MEDICINE

## 2024-01-22 RX ORDER — INSULIN LISPRO 100 [IU]/ML
1-5 INJECTION, SOLUTION INTRAVENOUS; SUBCUTANEOUS
Status: DISCONTINUED | OUTPATIENT
Start: 2024-01-22 | End: 2024-02-02 | Stop reason: HOSPADM

## 2024-01-22 RX ADMIN — CEFAZOLIN SODIUM 1000 MG: 1 SOLUTION INTRAVENOUS at 13:33

## 2024-01-22 RX ADMIN — HEPARIN SODIUM 5000 UNITS: 5000 INJECTION INTRAVENOUS; SUBCUTANEOUS at 20:11

## 2024-01-22 RX ADMIN — CEFAZOLIN SODIUM 1000 MG: 1 SOLUTION INTRAVENOUS at 05:10

## 2024-01-22 RX ADMIN — OXYCODONE HYDROCHLORIDE 10 MG: 10 TABLET ORAL at 08:04

## 2024-01-22 RX ADMIN — OXYCODONE HYDROCHLORIDE 10 MG: 10 TABLET ORAL at 20:10

## 2024-01-22 RX ADMIN — Medication 250 MG: at 18:04

## 2024-01-22 RX ADMIN — CEFAZOLIN SODIUM 1000 MG: 1 SOLUTION INTRAVENOUS at 20:11

## 2024-01-22 RX ADMIN — HYDROMORPHONE HYDROCHLORIDE 0.5 MG: 1 INJECTION, SOLUTION INTRAMUSCULAR; INTRAVENOUS; SUBCUTANEOUS at 14:42

## 2024-01-22 RX ADMIN — HEPARIN SODIUM 5000 UNITS: 5000 INJECTION INTRAVENOUS; SUBCUTANEOUS at 05:52

## 2024-01-22 RX ADMIN — ACETAMINOPHEN 650 MG: 325 TABLET, FILM COATED ORAL at 05:52

## 2024-01-22 RX ADMIN — OXYCODONE HYDROCHLORIDE 10 MG: 10 TABLET ORAL at 13:47

## 2024-01-22 RX ADMIN — PANTOPRAZOLE SODIUM 40 MG: 40 TABLET, DELAYED RELEASE ORAL at 05:52

## 2024-01-22 RX ADMIN — ASPIRIN 81 MG: 81 TABLET, COATED ORAL at 08:03

## 2024-01-22 RX ADMIN — HEPARIN SODIUM 5000 UNITS: 5000 INJECTION INTRAVENOUS; SUBCUTANEOUS at 13:38

## 2024-01-22 RX ADMIN — Medication 250 MG: at 08:03

## 2024-01-22 NOTE — PLAN OF CARE
Problem: PAIN - ADULT  Goal: Verbalizes/displays adequate comfort level or baseline comfort level  Description: Interventions:  - Encourage patient to monitor pain and request assistance  - Assess pain using appropriate pain scale  - Administer analgesics based on type and severity of pain and evaluate response  - Implement non-pharmacological measures as appropriate and evaluate response  - Consider cultural and social influences on pain and pain management  - Notify physician/advanced practitioner if interventions unsuccessful or patient reports new pain  Outcome: Progressing     Problem: Prexisting or High Potential for Compromised Skin Integrity  Goal: Skin integrity is maintained or improved  Description: INTERVENTIONS:  - Identify patients at risk for skin breakdown  - Assess and monitor skin integrity  - Assess and monitor nutrition and hydration status  - Monitor labs   - Assess for incontinence   - Turn and reposition patient  - Assist with mobility/ambulation  - Relieve pressure over bony prominences  - Avoid friction and shearing  - Provide appropriate hygiene as needed including keeping skin clean and dry  - Evaluate need for skin moisturizer/barrier cream  - Collaborate with interdisciplinary team   - Patient/family teaching  - Consider wound care consult   Outcome: Progressing     Problem: Nutrition/Hydration-ADULT  Goal: Nutrient/Hydration intake appropriate for improving, restoring or maintaining nutritional needs  Description: Monitor and assess patient's nutrition/hydration status for malnutrition. Collaborate with interdisciplinary team and initiate plan and interventions as ordered.  Monitor patient's weight and dietary intake as ordered or per policy. Utilize nutrition screening tool and intervene as necessary. Determine patient's food preferences and provide high-protein, high-caloric foods as appropriate.     INTERVENTIONS:  - Monitor oral intake, urinary output, labs, and treatment plans  -  Assess nutrition and hydration status and recommend course of action  - Evaluate amount of meals eaten  - Assist patient with eating if necessary   - Allow adequate time for meals  - Recommend/ encourage appropriate diets, oral nutritional supplements, and vitamin/mineral supplements  - Order, calculate, and assess calorie counts as needed  - Recommend, monitor, and adjust tube feedings and TPN/PPN based on assessed needs  - Assess need for intravenous fluids  - Provide specific nutrition/hydration education as appropriate  - Include patient/family/caregiver in decisions related to nutrition  Outcome: Progressing

## 2024-01-22 NOTE — PROGRESS NOTES
Progress Note - Infectious Disease   Rikki Farooqcat 64 y.o. male MRN: [id number]  Unit/Bed#: [facility] 823-01 Encounter: [id number]      Impression/Plan:    Brief Summary of Case & Impression:    65 y/o M with long-term spinal related paraplegia complicated by multiple pressure related ulcers with MRSA colonization and suprapubic catheter/colostomy need with recurrent ESBL/MDRO UTI here with right chest wall wound complicated by OM.  Wound noted in early December, and confirmed on CT on 12/22 showing fracture of right lateral 6th-8th ribs and new pleural effusion but without drainable abscess.  Patient was however stable at this time and therefore Gen Surg did not believe that he required urgent admission, which is why he presents weeks since the CT.    Patient has an open wound with exposed bone on his right mid lateral/near posterior chest wall.  Wound cultures of grown and isolated 4+ GBS.  For this reason we have made it clear that antibiotics are not recommended until this wound is closed and bone covered.  However patient did have an episode of fever and leukopenia on early 1/19 a.m. and was therefore started on Ceftriaxone per [facility].    ID has since recommended de-escalating to cefazolin.  OM does not cause bacteremia nor high fevers/leukocytosis and patient otherwise is without clear source of infection.  In fact, we are more concerned for viral infection given the leukopenia.  However COVID/flu/RSV were negative.  Similarly blood cultures continue to show no growth.  Regardless we are continuing antibiotics for cellulitis.    Otherwise it appears that patient will require a complicated procedure for chest wall debridement/rib resection/wound closure with muscle flap.  This will be a joint thoracic/plastic surgery undertaking.  Current plan is OR on Thursday for chest wall resection and reconstruction.     # Open Pressure Ulcer w/ Exposed Bone on Lateral R Mid-chest Wall w/o Evidence of Cellulitis  Lat R  6th-8th Rib Fx c/b OM underlying Open Wound  Wclx with 4+ GBS  Per plastics, will be available for wound reconstruction. Awaiting d/w [facility] re: timing of surgery  Wound care on-board, remains covered and with pressure dressings  Unlikely that ax on cx OM would cause fever or leukocytosis  This febrile episode was likely related to cx fever or inflammation. Was initially consideration for COVID as he was leukopenic at this time however COVID/Flu/RSV negative  Was started on IV CTX yet de-escalated to cefazolin  Initial plan was for short-term abx I/c/o fever, however now we will simply c/w abx throughout Op date for current possible cellulitis & considering he has potential date in next few days   Will require surgical tx of wound/OM f/b 6 week abx course  ?For OR per [facility]/Plastics on Thursday  Will perform rib excision, wound closure, and chest wall debridement     # H/o Paraplegia   C/b Frequent Pressure-related Wounds, h/o MRSA Colonization  C/b Detrussor Dysfxn now s/p Suprapubic Cather, Reccurrent UTI with h/o ESBL/MDRO  Patient has regular follow-up with medical and surgical wound care outpatient.  Additionally has 24/7 wound care home health  Typically has sacral decubiti ulcers, however this pressure wound on his thorax is a new complication  Has not had a recent flap procedure for other decubiti.  Do note that in the past he has had the surgeries which have not been successful  No concern of additional sources of infection from urinary or other skin/soft tissue origin     # Wheelchair-bound  This is related to patient's paraplegia and post AKA status  Seems that patient spends a good amount of time in his wheelchair, i.e. enough to cause this right lateral mid chest wall pressure wound  Is open to finding ways to avoid repeat pressure wounds on the thorax     # H/o T2DM, well-controlled, non-insulin dependent  A1c well below 7% per last check  Will likely impact wound healing overall, however should  not impair wound to completely close  Non-insulin-dependent which avoid additional alleys of infection  Mgmt per primary team     # HTN  HLD  NormoTNive so far throughout hospitalization  Is on antiHTN and statin at home, would continue while IP  Mgmt per primary team        Plan:     C/w IV Cefazolin now & for 6 Weeks Post-op  F/U surgical clx's --> consider broadening abx if clx (+), however if clx (-) may c/w Cefazolin post-op  Pain mgmt, DVT ppx per primary team  Monitor fever/WBC curves    WILL DISCUSS FINAL PLANS ON ROUNDS AND UPDATE TEAMS ACCORDINGLY. Please wait for final recommendations from Dr. Salomon.  ID consult service will continue to follow.    I have spent 75/55 or 50/35 minutes with Patient/family, other providers and in review of records today in completing this visit. Total time spent included review of testing, ordering medications and tests, communicating with other providers, documentation time and counseling/providing education to patient, family, caregiver and coordination of outpatient care as detailed in my note.     Antibiotics:    Current:  IV Cefazolin, D3  Previous:  IV CTX x 1 dose    24 Hour events:    Patient has done well over the weekend, no longer with any temperature spikes and white count remains stable although still at LLN.  He denies any worsening pain surrounding the chest wall wound, nor any significant drainage.  COVID/flu/RSV were negative and blood cultures remain without growth x 72 hours.  He will potentially be taken to the OR this coming Thursday or wound repair and rib resection.    Subjective:  Patient has no fever, chills, sweats; no nausea, vomiting, diarrhea; no cough, shortness of breath; no pain. No new symptoms.    Objective:  Vitals:  Temp:  [97.5 °F (36.4 °C)-98.4 °F (36.9 °C)] 98.4 °F (36.9 °C)  HR:  [50-79] 79  Resp:  [15-16] 16  BP: ()/(62-69) 105/69  SpO2:  [97 %-99 %] 97 %  Temp (24hrs), Av.1 °F (36.7 °C), Min:97.5 °F (36.4 °C), Max:98.4 °F (36.9  °C)  Current: Temperature: 98.4 °F (36.9 °C)    Physical Exam:   General Appearance:  Alert, interactive, nontoxic, no acute distress.   Throat: Oropharynx moist without lesions.    Lungs:   Clear to auscultation bilaterally; no wheezes, rhonchi or rales; respirations unlabored   Heart:  RRR; no murmur, rub or gallop   Abdomen:   Soft, non-tender, non-distended, positive bowel sounds.     Extremities: No clubbing, cyanosis or edema   Skin: R lateral chest wall wound covered with pressure dressing. Mild TTP of surrounding area with slight erythema at wound edges.       Labs, Imaging, & Other studies:   All pertinent labs and imaging studies were personally reviewed as below.  Results from last 7 days   Lab Units 01/21/24  0449 01/20/24  0515 01/19/24  0457   WBC Thousand/uL 4.72 4.03* 3.61*   HEMOGLOBIN g/dL 10.0* 9.2* 9.5*   PLATELETS Thousands/uL 295 325 326     Results from last 7 days   Lab Units 01/22/24  1412 01/19/24  0457 01/18/24  1024 01/17/24  1147   POTASSIUM mmol/L 3.9   < > 3.6 4.6   CHLORIDE mmol/L 104   < > 100 106   CO2 mmol/L 30   < > 26 23   BUN mg/dL 17   < > 19 13   CREATININE mg/dL 0.39*   < > 0.44* 0.36*   EGFR ml/min/1.73sq m 127   < > 121 132   CALCIUM mg/dL 8.5   < > 8.9 9.0   AST U/L  --   --  11* 17   ALT U/L  --   --  7 7   ALK PHOS U/L  --   --  77 76    < > = values in this interval not displayed.     Results from last 7 days   Lab Units 01/18/24  2305 01/18/24  1024 01/17/24  1747 01/17/24  1147   BLOOD CULTURE  No Growth at 72 hrs.  No Growth at 72 hrs. No Growth at 72 hrs.  --  No Growth After 5 Days.   GRAM STAIN RESULT   --   --  2+ Gram positive cocci in pairs and chains*  No polys seen*  --    WOUND CULTURE   --   --  4+ Growth of Beta Hemolytic Streptococcus Group G*  3+ Growth of  --      Miguel Candelaria MD  Internal Medicine Residency PGY-2  [facility], [place]

## 2024-01-22 NOTE — PROGRESS NOTES
"Thoracic Surgery  Progress Note   Rikki Farooqcat 64 y.o. male MRN: [id number]  Unit/Bed#: [facility] 823-01 Encounter: [id number]    Assessment:  64 y.o. male who presents with a right chest wall wound. Concerns for osteomyelitis.     Afebrile, soft BP 90's/60's, MAP 70's, 98% on room air  UOP: 850 cc     WBC pending from 4.7 to  Hgb pending from 10.0  Plts pending from 295  Cr pending from 0.34     Wound Culture: Beta hemolytic streptococcus group G   repeat blood cx NG x24hr    Plan:  -regular diet,   -Ensures for supplemental nutrition  -Continue Ancef, ID on board, appreciate recs, follow cx  -Coordination w/ Plastics for OR timing, possibly   -Daily packing changes to R chest wall  -SQH  -incentive spirometry  -encourage ambulation  -pain control      Subjective/Objective     Subjective:   Patient seen and examined at bedside, in no acute distress. No acute events overnight.  Patient reports some pain near his wound.  Patient denies nausea vomiting fever chills chest pain or shortness of breath.  Patient is tolerating his diet.    Objective:   Vitals:Blood pressure 96/62, pulse 72, temperature 98.4 °F (36.9 °C), resp. rate 15, height 5' 7\" (1.702 m), weight 65.8 kg (145 lb), SpO2 99%.  Temp (24hrs), Av.4 °F (36.9 °C), Min:98.2 °F (36.8 °C), Max:98.5 °F (36.9 °C)      I/O          0701   0700  0701   0700  0701   0700    P.O.  410 290    Total Intake(mL/kg)  410 (6.2) 290 (4.4)    Urine (mL/kg/hr) 1000 (0.6) 2125 (1.3)     Stool 200 0     Total Output 1200 2125     Net -1200 -1715 +290           Unmeasured Stool Occurrence  1 x             Invasive Devices       Peripheral Intravenous Line  Duration             Peripheral IV 24 Proximal;Right;Ventral (anterior) Forearm 1 day              Drain  Duration             Colostomy LLQ 4 days    Suprapubic Catheter 4 days                    Physical Exam:  General: No acute distress  Neuro: Awake, Alert and Oriented x " 3  HEENT:  Normocephalic, atraumatic, moist mucous membranes  CV: Regular rate and rhythm  Lungs: Normal work of breathing, no increased respiratory effort  Chest: R chest wall wound w/ packing replaced, nontender and no fluctuance  Abdomen: Soft, non-tender, non-distended  Extremities: No edema, clubbing or cyanosis  Skin: Warm, dry        Lab Results   Component Value Date    WBC 4.72 01/21/2024    HGB 10.0 (L) 01/21/2024    HCT 35.6 (L) 01/21/2024    MCV 81 (L) 01/21/2024     01/21/2024      Lab Results   Component Value Date     02/17/2014    SODIUM 135 01/20/2024    K 3.9 01/20/2024     01/20/2024    CO2 24 01/20/2024    ANIONGAP 8 02/17/2014    AGAP 7 01/20/2024    BUN 12 01/20/2024    CREATININE 0.34 (L) 01/20/2024    GLUC 97 01/20/2024    GLUF 62 (L) 08/29/2023    CALCIUM 8.1 (L) 01/20/2024    AST 11 (L) 01/18/2024    ALT 7 01/18/2024    ALKPHOS 77 01/18/2024    PROT 8.0 02/17/2014    TP 8.6 (H) 01/18/2024    BILITOT 0.3 02/17/2014    TBILI 0.39 01/18/2024    EGFR 135 01/20/2024       VTE Prophylaxis: Heparin        Akhil Mills MD  1/22/2024  5:54 AM

## 2024-01-22 NOTE — PROGRESS NOTES
Progress Note - Infectious Disease   Rikki Arguello 64 y.o. male MRN: [id number]  Unit/Bed#: [facility] 823-01 Encounter: [id number]      Impression/Recommendations:  Chronic osteomyelitis of the right 6 through 8 ribs with pathologic fracture.  Etiology of osteomyelitis is chronically nonhealing right chest wall decubitus ulcer.  Patient was started on cefazolin for possible cellulitis.  Plan for repeat resection and flap closure later this week noted.  Patient will need deep tissue and bone biopsy to help guide antibiotic therapy.  He will need long-term IV antibiotic..  Antibiotic plan for possible cellulitis, as in below.    Plan for I&D, with resection and flap closure later this week noted.  Will follow-up on operative findings and operative culture.  After flap closure, patient will need long-term IV antibiotic, guided by bone culture result.     Fever overnight 1/18.  Patient had brief hypotension with it.  Vancomycin/ceftriaxone was started.  Patient has no chills and was not even aware of his fever.  Given chronicity of his wound, I suspect that his intermittent fever may also have been chronic.  There is no obvious active acute infection, except for possible chest wall cellulitis.  Recent wound culture had growth of GGS.  Antibiotic was de-escalated to cefazolin.  Patient remains well, without further fever.  Blood cultures have no growth.  COVID screen negative.  Continue IV cefazolin.  Monitor temperature/WBC.  Follow-up blood cultures.  Will likely keep antibiotic regimen short for possible soft tissue infection.     Chronic and progressing right chest wall decubitus ulcer, likely secondary to lack of padding of the wheelchair.  In order for flap closure to be successful, patient will need to offload his chest wall as much as possible.  Otherwise, with ongoing pressure at the chest wall after flap reconstruction, probability of flap failure will be very high.  I discussed with patient in great detail  regarding this.  Continue local wound care for now.  Aggressive offloading of right chest wall.     4. Longstanding paraplegia, wheelchair-bound, with multiple prior decubitus ulcers, including right lower leg resulting in right AKA.     5. DM, reasonably well-controlled.  This contributes to poor wound healing and infection above.  Management per primary service.     Discussed with patient in detail regarding the above plan.     Antibiotics:  Cefazolin # 4     Subjective:  Patient feels well.  Minimal pain in right chest wall.  No respiratory symptoms.  Temperature stays down.  No chills.  He is tolerating antibiotic well.  No nausea, vomiting or diarrhea.     Objective:  Vitals:  Temp:  [97.5 °F (36.4 °C)-98.4 °F (36.9 °C)] 98.4 °F (36.9 °C)  HR:  [50-79] 79  Resp:  [15-16] 16  BP: ()/(62-69) 105/69  SpO2:  [97 %-99 %] 97 %  Temp (24hrs), Av.1 °F (36.7 °C), Min:97.5 °F (36.4 °C), Max:98.4 °F (36.9 °C)  Current: Temperature: 98.4 °F (36.9 °C)    Physical Exam:     General: Awake, alert, cooperative, no distress.   Neck:  Supple. No mass.  No lymphadenopathy.   Lungs: Expansion symmetric, no rales, no wheezing, respirations unlabored.   Heart:  Regular rate and rhythm, S1 and S2 normal, no murmur.   Abdomen: Soft, nondistended, non-tender, bowel sounds active all four quadrants, no masses, no organomegaly.   Extremities: No edema. No erythema/warmth. No ulcer. Nontender to palpation.   Skin:  No rash.   Neuro: Moves all extremities.     Invasive Devices       Peripheral Intravenous Line  Duration             Peripheral IV 24 Proximal;Right;Ventral (anterior) Forearm 1 day              Drain  Duration             Colostomy LLQ 4 days    Suprapubic Catheter 4 days                    Labs studies:   I have personally reviewed pertinent labs.  Results from last 7 days   Lab Units 24  1412 24  0515 24  0457 24  1024 24  1147   POTASSIUM mmol/L 3.9 3.9 3.3* 3.6 4.6   CHLORIDE  mmol/L 104 104 106 100 106   CO2 mmol/L 30 24 23 26 23   BUN mg/dL 17 12 15 19 13   CREATININE mg/dL 0.39* 0.34* 0.42* 0.44* 0.36*   EGFR ml/min/1.73sq m 127 135 123 121 132   CALCIUM mg/dL 8.5 8.1* 8.3* 8.9 9.0   AST U/L  --   --   --  11* 17   ALT U/L  --   --   --  7 7   ALK PHOS U/L  --   --   --  77 76     Results from last 7 days   Lab Units 01/21/24  0449 01/20/24  0515 01/19/24  0457   WBC Thousand/uL 4.72 4.03* 3.61*   HEMOGLOBIN g/dL 10.0* 9.2* 9.5*   PLATELETS Thousands/uL 295 325 326     Results from last 7 days   Lab Units 01/18/24  2305 01/18/24  1024 01/17/24  1747 01/17/24  1147   BLOOD CULTURE  No Growth at 72 hrs.  No Growth at 72 hrs. No Growth at 72 hrs.  --  No Growth After 5 Days.   GRAM STAIN RESULT   --   --  2+ Gram positive cocci in pairs and chains*  No polys seen*  --    WOUND CULTURE   --   --  4+ Growth of Beta Hemolytic Streptococcus Group G*  3+ Growth of  --        Imaging Studies:   I have personally reviewed pertinent imaging study reports and images in PACS.    EKG, Pathology, and Other Studies:   I have personally reviewed pertinent reports.

## 2024-01-23 LAB
ANION GAP SERPL CALCULATED.3IONS-SCNC: 6 MMOL/L
BACTERIA BLD CULT: NORMAL
BASOPHILS # BLD AUTO: 0.04 THOUSANDS/ÂΜL (ref 0–0.1)
BASOPHILS NFR BLD AUTO: 1 % (ref 0–1)
BUN SERPL-MCNC: 15 MG/DL (ref 5–25)
CALCIUM SERPL-MCNC: 9.1 MG/DL (ref 8.4–10.2)
CHLORIDE SERPL-SCNC: 105 MMOL/L (ref 96–108)
CO2 SERPL-SCNC: 27 MMOL/L (ref 21–32)
CREAT SERPL-MCNC: 0.41 MG/DL (ref 0.6–1.3)
EOSINOPHIL # BLD AUTO: 0.31 THOUSAND/ÂΜL (ref 0–0.61)
EOSINOPHIL NFR BLD AUTO: 8 % (ref 0–6)
ERYTHROCYTE [DISTWIDTH] IN BLOOD BY AUTOMATED COUNT: 21 % (ref 11.6–15.1)
GFR SERPL CREATININE-BSD FRML MDRD: 125 ML/MIN/1.73SQ M
GLUCOSE SERPL-MCNC: 112 MG/DL (ref 65–140)
GLUCOSE SERPL-MCNC: 115 MG/DL (ref 65–140)
GLUCOSE SERPL-MCNC: 125 MG/DL (ref 65–140)
GLUCOSE SERPL-MCNC: 88 MG/DL (ref 65–140)
HCT VFR BLD AUTO: 40.5 % (ref 36.5–49.3)
HGB BLD-MCNC: 11.1 G/DL (ref 12–17)
IMM GRANULOCYTES # BLD AUTO: 0.01 THOUSAND/UL (ref 0–0.2)
IMM GRANULOCYTES NFR BLD AUTO: 0 % (ref 0–2)
LYMPHOCYTES # BLD AUTO: 1.52 THOUSANDS/ÂΜL (ref 0.6–4.47)
LYMPHOCYTES NFR BLD AUTO: 37 % (ref 14–44)
MCH RBC QN AUTO: 23.1 PG (ref 26.8–34.3)
MCHC RBC AUTO-ENTMCNC: 27.4 G/DL (ref 31.4–37.4)
MCV RBC AUTO: 84 FL (ref 82–98)
MONOCYTES # BLD AUTO: 0.3 THOUSAND/ÂΜL (ref 0.17–1.22)
MONOCYTES NFR BLD AUTO: 7 % (ref 4–12)
NEUTROPHILS # BLD AUTO: 1.95 THOUSANDS/ÂΜL (ref 1.85–7.62)
NEUTS SEG NFR BLD AUTO: 47 % (ref 43–75)
NRBC BLD AUTO-RTO: 1 /100 WBCS
PLATELET # BLD AUTO: 283 THOUSANDS/UL (ref 149–390)
PMV BLD AUTO: 11 FL (ref 8.9–12.7)
POTASSIUM SERPL-SCNC: 4.1 MMOL/L (ref 3.5–5.3)
RBC # BLD AUTO: 4.81 MILLION/UL (ref 3.88–5.62)
SODIUM SERPL-SCNC: 138 MMOL/L (ref 135–147)
WBC # BLD AUTO: 4.13 THOUSAND/UL (ref 4.31–10.16)

## 2024-01-23 PROCEDURE — 80048 BASIC METABOLIC PNL TOTAL CA: CPT | Performed by: STUDENT IN AN ORGANIZED HEALTH CARE EDUCATION/TRAINING PROGRAM

## 2024-01-23 PROCEDURE — 99232 SBSQ HOSP IP/OBS MODERATE 35: CPT | Performed by: INTERNAL MEDICINE

## 2024-01-23 PROCEDURE — NC001 PR NO CHARGE: Performed by: PLASTIC SURGERY

## 2024-01-23 PROCEDURE — 85025 COMPLETE CBC W/AUTO DIFF WBC: CPT | Performed by: STUDENT IN AN ORGANIZED HEALTH CARE EDUCATION/TRAINING PROGRAM

## 2024-01-23 PROCEDURE — NC001 PR NO CHARGE: Performed by: THORACIC SURGERY (CARDIOTHORACIC VASCULAR SURGERY)

## 2024-01-23 PROCEDURE — 82948 REAGENT STRIP/BLOOD GLUCOSE: CPT

## 2024-01-23 RX ADMIN — Medication 250 MG: at 17:42

## 2024-01-23 RX ADMIN — CEFAZOLIN SODIUM 1000 MG: 1 SOLUTION INTRAVENOUS at 12:20

## 2024-01-23 RX ADMIN — HYDROMORPHONE HYDROCHLORIDE 0.5 MG: 1 INJECTION, SOLUTION INTRAMUSCULAR; INTRAVENOUS; SUBCUTANEOUS at 21:12

## 2024-01-23 RX ADMIN — PANTOPRAZOLE SODIUM 40 MG: 40 TABLET, DELAYED RELEASE ORAL at 05:54

## 2024-01-23 RX ADMIN — ASPIRIN 81 MG: 81 TABLET, COATED ORAL at 09:43

## 2024-01-23 RX ADMIN — OXYCODONE HYDROCHLORIDE 10 MG: 10 TABLET ORAL at 05:54

## 2024-01-23 RX ADMIN — CEFAZOLIN SODIUM 1000 MG: 1 SOLUTION INTRAVENOUS at 21:12

## 2024-01-23 RX ADMIN — HEPARIN SODIUM 5000 UNITS: 5000 INJECTION INTRAVENOUS; SUBCUTANEOUS at 05:54

## 2024-01-23 RX ADMIN — Medication 250 MG: at 09:43

## 2024-01-23 RX ADMIN — HYDROMORPHONE HYDROCHLORIDE 0.5 MG: 1 INJECTION, SOLUTION INTRAMUSCULAR; INTRAVENOUS; SUBCUTANEOUS at 13:50

## 2024-01-23 RX ADMIN — HEPARIN SODIUM 5000 UNITS: 5000 INJECTION INTRAVENOUS; SUBCUTANEOUS at 13:50

## 2024-01-23 RX ADMIN — OXYCODONE HYDROCHLORIDE 10 MG: 10 TABLET ORAL at 12:24

## 2024-01-23 RX ADMIN — HYDROMORPHONE HYDROCHLORIDE 0.5 MG: 1 INJECTION, SOLUTION INTRAMUSCULAR; INTRAVENOUS; SUBCUTANEOUS at 07:37

## 2024-01-23 RX ADMIN — CEFAZOLIN SODIUM 1000 MG: 1 SOLUTION INTRAVENOUS at 05:54

## 2024-01-23 RX ADMIN — OXYCODONE HYDROCHLORIDE 10 MG: 10 TABLET ORAL at 19:14

## 2024-01-23 RX ADMIN — HEPARIN SODIUM 5000 UNITS: 5000 INJECTION INTRAVENOUS; SUBCUTANEOUS at 21:12

## 2024-01-23 NOTE — CASE MANAGEMENT
Case Management Discharge Planning Note    Patient name Rikki Arguello  Location [facility] 823/[facility] 823-01 MRN [id number]  : 1960 Date 2024       Current Admission Date: 2024  Current Admission Diagnosis:Rib pain, Multiple drug resistant organism (MDRO) culture positive, Other chronic osteomyelitis, other site ([facility])   Patient Active Problem List    Diagnosis Date Noted    Subacute osteomyelitis, other site ([facility]) 2023    Foul smelling urine 2023    Pressure ulcer of left buttock, stage 4 ([facility]) 01/10/2023    Heme positive stool 2022    Open wound of buttock, left, initial encounter 2022    Pressure injury of contiguous region involving back and left buttock, stage 4 ([facility]) 2021    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 ([facility]) 2021    Stage IV pressure ulcer of right lower back ([facility]) 2021    Type 2 diabetes mellitus without complication, without long-term current use of insulin ([facility]) 2021    Renal cyst 2019    Other male erectile dysfunction 2019    Prostate cancer screening 2019    Hypokalemia 10/29/2019    SBO (small bowel obstruction) ([facility]) 10/28/2019    Sepsis ([facility]) 10/28/2019    Stage II pressure ulcer of buttock ([facility]) 2019    Left perineal ischial pressure ulcer, stage IV ([facility]) 2019    Hyperkalemia 2019    History of Clostridium difficile colitis 2019    Neurogenic bladder 2019    Sepsis with hypotension  2019    Osteomyelitis of pelvic region ([facility]) 2019    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    Colostomy in place ([facility]) 2019    Colon cancer screening 2019    Dyspepsia 2019    Epigastric pain 2019    Continuous opioid dependence ([facility])     Decubitus ulcer of ischium, left, stage IV ([facility]) 2018    Diarrhea 2018    Amputated right leg ([facility]) 2018    Anxiety 2018    GERD (gastroesophageal reflux disease)     History of osteomyelitis     Cyst of joint  of shoulder 10/20/2016    Anemia 10/20/2016    Paraplegic spinal paralysis ([facility]) 10/20/2016    Sinus tachycardia 10/20/2016    Stage IV pressure ulcer of sacral region ([facility]) 10/15/2016    Stage IV pressure ulcer of left heel ([facility]) 10/15/2016    Diabetes mellitus type II, controlled ([facility]) 03/07/2014    Benign essential hypertension 07/31/2013      LOS (days): 6  Geometric Mean LOS (GMLOS) (days): 4.4  Days to GMLOS:-1.4     OBJECTIVE:  Risk of Unplanned Readmission Score: 13.1         Current admission status: Inpatient   Preferred Pharmacy:   [id number] [place] - [address]  [address] [facility] [address]  Phone: [phone] Fax: [phone]    [id number] [place] [facility] [address]  [address] [facility] [address]  Phone: [phone] Fax: [phone]    Primary Care Provider: Lexy Bo MD    Primary Insurance: MEDICARE  Secondary Insurance: [facility]    DISCHARGE DETAILS:                 CM following. Pt to go to OR for chest wall debridement 1/25 with rib resection and muscle flap coverage. Will require long term IV abx for osteomyelitis.   CM to place appropriate referrals when closer to d/c.

## 2024-01-23 NOTE — PLAN OF CARE
Problem: Potential for Falls  Goal: Patient will remain free of falls  Description: INTERVENTIONS:  - Educate patient/family on patient safety including physical limitations  - Instruct patient to call for assistance with activity   - Consult OT/PT to assist with strengthening/mobility   - Keep Call bell within reach  - Keep bed low and locked with side rails adjusted as appropriate  - Keep care items and personal belongings within reach  - Initiate and maintain comfort rounds  - Make Fall Risk Sign visible to staff  - Offer Toileting every 4 Hours, in advance of need  - Initiate/Maintain bed alarm  - Obtain necessary fall risk management equipment:   - Apply yellow socks and bracelet for high fall risk patients  - Consider moving patient to room near nurses station  Outcome: Progressing     Problem: PAIN - ADULT  Goal: Verbalizes/displays adequate comfort level or baseline comfort level  Description: Interventions:  - Encourage patient to monitor pain and request assistance  - Assess pain using appropriate pain scale  - Administer analgesics based on type and severity of pain and evaluate response  - Implement non-pharmacological measures as appropriate and evaluate response  - Consider cultural and social influences on pain and pain management  - Notify physician/advanced practitioner if interventions unsuccessful or patient reports new pain  Outcome: Progressing     Problem: INFECTION - ADULT  Goal: Absence or prevention of progression during hospitalization  Description: INTERVENTIONS:  - Assess and monitor for signs and symptoms of infection  - Monitor lab/diagnostic results  - Monitor all insertion sites, i.e. indwelling lines, tubes, and drains  - Monitor endotracheal if appropriate and nasal secretions for changes in amount and color  - [place] appropriate cooling/warming therapies per order  - Administer medications as ordered  - Instruct and encourage patient and family to use good hand hygiene  technique  - Identify and instruct in appropriate isolation precautions for identified infection/condition  Outcome: Progressing  Goal: Absence of fever/infection during neutropenic period  Description: INTERVENTIONS:  - Monitor WBC    Outcome: Progressing     Problem: SAFETY ADULT  Goal: Patient will remain free of falls  Description: INTERVENTIONS:  - Educate patient/family on patient safety including physical limitations  - Instruct patient to call for assistance with activity   - Consult OT/PT to assist with strengthening/mobility   - Keep Call bell within reach  - Keep bed low and locked with side rails adjusted as appropriate  - Keep care items and personal belongings within reach  - Initiate and maintain comfort rounds  - Make Fall Risk Sign visible to staff  - Offer Toileting every 4 Hours, in advance of need  - Initiate/Maintain bed alarm  - Obtain necessary fall risk management equipment:   - Apply yellow socks and bracelet for high fall risk patients  - Consider moving patient to room near nurses station  Outcome: Progressing  Goal: Maintain or return to baseline ADL function  Description: INTERVENTIONS:  -  Assess patient's ability to carry out ADLs; assess patient's baseline for ADL function and identify physical deficits which impact ability to perform ADLs (bathing, care of mouth/teeth, toileting, grooming, dressing, etc.)  - Assess/evaluate cause of self-care deficits   - Assess range of motion  - Assess patient's mobility; develop plan if impaired  - Assess patient's need for assistive devices and provide as appropriate  - Encourage maximum independence but intervene and supervise when necessary  - Involve family in performance of ADLs  - Assess for home care needs following discharge   - Consider OT consult to assist with ADL evaluation and planning for discharge  - Provide patient education as appropriate  Outcome: Progressing  Goal: Maintains/Returns to pre admission functional  level  Description: INTERVENTIONS:  - Perform AM-PAC 6 Click Basic Mobility/ Daily Activity assessment daily.  - Set and communicate daily mobility goal to care team and patient/family/caregiver.   - Collaborate with rehabilitation services on mobility goals if consulted  - Perform Range of Motion 3 times a day.  - Reposition patient every 3 hours.  - Dangle patient 3 times a day  - Stand patient 3 times a day  - Ambulate patient 3 times a day  - Out of bed to chair 3 times a day   - Out of bed for meals 3 times a day  - Out of bed for toileting  - Record patient progress and toleration of activity level   Outcome: Progressing     Problem: DISCHARGE PLANNING  Goal: Discharge to home or other facility with appropriate resources  Description: INTERVENTIONS:  - Identify barriers to discharge w/patient and caregiver  - Arrange for needed discharge resources and transportation as appropriate  - Identify discharge learning needs (meds, wound care, etc.)  - Arrange for interpretive services to assist at discharge as needed  - Refer to Case Management Department for coordinating discharge planning if the patient needs post-hospital services based on physician/advanced practitioner order or complex needs related to functional status, cognitive ability, or social support system  Outcome: Progressing     Problem: Knowledge Deficit  Goal: Patient/family/caregiver demonstrates understanding of disease process, treatment plan, medications, and discharge instructions  Description: Complete learning assessment and assess knowledge base.  Interventions:  - Provide teaching at level of understanding  - Provide teaching via preferred learning methods  Outcome: Progressing     Problem: Prexisting or High Potential for Compromised Skin Integrity  Goal: Skin integrity is maintained or improved  Description: INTERVENTIONS:  - Identify patients at risk for skin breakdown  - Assess and monitor skin integrity  - Assess and monitor nutrition  and hydration status  - Monitor labs   - Assess for incontinence   - Turn and reposition patient  - Assist with mobility/ambulation  - Relieve pressure over bony prominences  - Avoid friction and shearing  - Provide appropriate hygiene as needed including keeping skin clean and dry  - Evaluate need for skin moisturizer/barrier cream  - Collaborate with interdisciplinary team   - Patient/family teaching  - Consider wound care consult   Outcome: Progressing     Problem: Nutrition/Hydration-ADULT  Goal: Nutrient/Hydration intake appropriate for improving, restoring or maintaining nutritional needs  Description: Monitor and assess patient's nutrition/hydration status for malnutrition. Collaborate with interdisciplinary team and initiate plan and interventions as ordered.  Monitor patient's weight and dietary intake as ordered or per policy. Utilize nutrition screening tool and intervene as necessary. Determine patient's food preferences and provide high-protein, high-caloric foods as appropriate.     INTERVENTIONS:  - Monitor oral intake, urinary output, labs, and treatment plans  - Assess nutrition and hydration status and recommend course of action  - Evaluate amount of meals eaten  - Assist patient with eating if necessary   - Allow adequate time for meals  - Recommend/ encourage appropriate diets, oral nutritional supplements, and vitamin/mineral supplements  - Order, calculate, and assess calorie counts as needed  - Recommend, monitor, and adjust tube feedings and TPN/PPN based on assessed needs  - Assess need for intravenous fluids  - Provide specific nutrition/hydration education as appropriate  - Include patient/family/caregiver in decisions related to nutrition  Outcome: Progressing

## 2024-01-23 NOTE — PROGRESS NOTES
Progress Note - Infectious Disease   Rikki TEE Moscat 64 y.o. male MRN: [id number]  Unit/Bed#: [facility] 823-01 Encounter: [id number]      Impression/Plan:    63 y/o M with long-term spinal related paraplegia complicated by multiple pressure related ulcers with MRSA colonization and suprapubic catheter/colostomy need with recurrent ESBL/MDRO UTI here with right chest wall wound complicated by OM.  Wound noted in early December, and confirmed on CT on 12/22 showing fracture of right lateral 6th-8th ribs and new pleural effusion but without drainable abscess.  Patient was however stable at this time and therefore Gen Surg did not believe that he required urgent admission, which is why he presents weeks since the CT.     Patient has an open wound with exposed bone on his right mid lateral/near posterior chest wall.  Wound cultures of grown and isolated 4+ GBS.  For this reason we have made it clear that antibiotics are not recommended until this wound is closed and bone covered.  However patient did have an episode of fever and leukopenia on early 1/19 a.m. and was therefore started on Ceftriaxone per [facility]. ID has since recommended de-escalating to cefazolin.     Otherwise patient is planned for OR on Thursday. Afterwards he will require IV abx course as mentioned below. Can likely c/w Cefazolin pending final results from bone bx.     # Open Pressure Ulcer w/ Exposed Bone on Lateral R Mid-chest Wall w/o Evidence of Cellulitis  Lat R 6th-8th Rib Fx c/b OM underlying Open Wound  Wclx with 4+ GBS  Per plastics, will be available for wound reconstruction. Awaiting d/w [facility] re: timing of surgery  Wound care on-board, remains covered and with pressure dressings  Will require surgical tx of wound/OM f/b 6 week abx course  If Bone Bx (-) for Bacteria --> C/w Cefazolin  If Bone Bx (+) for any Bacteria other than GBS --> Consider change in Abx regimen  ?For OR per [facility]/Plastics on Thursday  Will perform rib excision,  wound closure, and chest wall debridement     # H/o Paraplegia   C/b Frequent Pressure-related Wounds, h/o MRSA Colonization  C/b Detrussor Dysfxn now s/p Suprapubic Cather, Reccurrent UTI with h/o ESBL/MDRO  Patient has regular follow-up with medical and surgical wound care outpatient.  Additionally has 24/7 wound care home health  Typically has sacral decubiti ulcers, however this pressure wound on his thorax is a new complication  Has not had a recent flap procedure for other decubiti.  Do note that in the past he has had the surgeries which have not been successful  No concern of additional sources of infection from urinary or other skin/soft tissue origin     # Wheelchair-bound  This is related to patient's paraplegia and post AKA status  Seems that patient spends a good amount of time in his wheelchair, i.e. enough to cause this right lateral mid chest wall pressure wound  Is open to finding ways to avoid repeat pressure wounds on the thorax     # H/o T2DM, well-controlled, non-insulin dependent  A1c well below 7% per last check  Will likely impact wound healing overall, however should not impair wound to completely close  Non-insulin-dependent which avoid additional alleys of infection  Mgmt per primary team     # HTN  HLD  NormoTNive so far throughout hospitalization  Is on antiHTN and statin at home, would continue while IP  Mgmt per primary team        Plan:     C/w IV Cefazolin now & for 6 Weeks Post-op  F/U surgical clx's --> consider broadening abx if clx (+), however if clx (-) may c/w Cefazolin post-op  Pain mgmt, DVT ppx per primary team  Monitor fever/WBC curves    WILL DISCUSS FINAL PLANS ON ROUNDS AND UPDATE TEAMS ACCORDINGLY. Please wait for final recommendations from Dr. Salomon.  ID consult service will continue to follow.    I have spent 75/55 or 50/35 minutes with Patient/family, other providers and in review of records today in completing this visit. Total time spent included review of testing,  ordering medications and tests, communicating with other providers, documentation time and counseling/providing education to patient, family, caregiver and coordination of outpatient care as detailed in my note.     Antibiotics:    Current:  IV Cefazolin, D4  Previous:  IV CTX x 1 dose    24 Hour events:    NAEO, Mr Arguello was seen earlier this morning resting comfortably in bed.     Subjective    Patient has no fever, chills, sweats; no nausea, vomiting, diarrhea; no cough, shortness of breath; no pain. No new symptoms.    Objective:  Vitals:  Temp:  [98.2 °F (36.8 °C)-98.6 °F (37 °C)] 98.2 °F (36.8 °C)  HR:  [56-79] 71  Resp:  [16-17] 16  BP: (105-157)/(69-84) 123/80  SpO2:  [96 %-97 %] 96 %  Temp (24hrs), Av.4 °F (36.9 °C), Min:98.2 °F (36.8 °C), Max:98.6 °F (37 °C)  Current: Temperature: 98.2 °F (36.8 °C)    Physical Exam:   General Appearance:  Alert, interactive, nontoxic, no acute distress.   Throat: Oropharynx moist without lesions.    Lungs:   Clear to auscultation bilaterally; no wheezes, rhonchi or rales; respirations unlabored   Heart:  RRR; no murmur, rub or gallop   Abdomen:   Soft, non-tender, non-distended, positive bowel sounds.     Extremities: No clubbing, cyanosis or edema   Skin: Lateral R mid-chest wall wound covered by pressure dressing. No significant surrounding erythema/edema/discharge and no significant TTP       Labs, Imaging, & Other studies:   All pertinent labs and imaging studies were personally reviewed as below.  Results from last 7 days   Lab Units 24  0647 24  1610 24  0449   WBC Thousand/uL 4.13* 3.81* 4.72   HEMOGLOBIN g/dL 11.1* 9.3* 10.0*   PLATELETS Thousands/uL 283 345 295     Results from last 7 days   Lab Units 24  0647 24  0457 24  1024 24  1147   POTASSIUM mmol/L 4.1   < > 3.6 4.6   CHLORIDE mmol/L 105   < > 100 106   CO2 mmol/L 27   < > 26 23   BUN mg/dL 15   < > 19 13   CREATININE mg/dL 0.41*   < > 0.44* 0.36*   EGFR  ml/min/1.73sq m 125   < > 121 132   CALCIUM mg/dL 9.1   < > 8.9 9.0   AST U/L  --   --  11* 17   ALT U/L  --   --  7 7   ALK PHOS U/L  --   --  77 76    < > = values in this interval not displayed.     Results from last 7 days   Lab Units 01/18/24  2305 01/18/24  1024 01/17/24  1747 01/17/24  1147   BLOOD CULTURE  No Growth After 4 Days.  No Growth After 4 Days. No Growth After 4 Days.  --  No Growth After 5 Days.   GRAM STAIN RESULT   --   --  2+ Gram positive cocci in pairs and chains*  No polys seen*  --    WOUND CULTURE   --   --  4+ Growth of Beta Hemolytic Streptococcus Group G*  3+ Growth of  --        Miguel Candelaria MD  Internal Medicine Residency PGY-2  [facility]

## 2024-01-23 NOTE — PROGRESS NOTES
Progress Note - Plastic Surgery   Rikki Arguello 64 y.o. male MRN: [id number]  Unit/Bed#: [facility] 823-01 Encounter: [id number]    Assessment:  Right lateral chest wound with underlying osteo and pathologic fracture of ribs 6-8, likely due to pressure ulcer/necrosis  Paraplegia, wheelchair bound  DM II    Plan:  I had a lengthy discussion with the patient regarding his wound and underlying etiology.  I discussed the operative treatment plan of resection of the wound and underlying ribs in coordination with thoracic surgery with immediate reconstruction with latissimus dorsi muscle flap for obliteration of dead space.  Patient likely has adequate skin laxity for primary closure over the muscle flap.  Patient will need incisional wound VAC to help with healing.  Surgical drains will be used.  I did discuss in order for flap closure to be successful, patient will need to offload his chest wall as much as possible.  Otherwise, with ongoing pressure at the chest wall after flap reconstruction, probability of flap failure will be very high.  I discussed with patient in great detail regarding this.  Patient will need his wheelchair adjusted for appropriate truncal support to prevent leaning on the right side with significant padding of the right arm as well.  The wheelchair control need to be moved to the left side.  Patient states he will be able to control the joystick with his left hand.  He did have the contact information for his wheelchair company.  I will reach out to them to discuss.  This will need to be accomplished prior to discharge home.  For now, we are planning on operative intervention on Thursday.  The patient noted his understanding of the plan of care.  He had opportunity for questions.  All were answered.  He desires to proceed.  We will continue to follow.    Time spent at bedside  and in co-coordination of care:30mins.    Subjective/Objective   Subjective: No events ON, No new complaints    Objective:  "    Blood pressure 123/80, pulse 71, temperature 98.2 °F (36.8 °C), resp. rate 16, height 5' 7\" (1.702 m), weight 65.8 kg (145 lb), SpO2 96%.,Body mass index is 22.71 kg/m².      Intake/Output Summary (Last 24 hours) at 1/23/2024 1410  Last data filed at 1/23/2024 0515  Gross per 24 hour   Intake --   Output 825 ml   Net -825 ml       Invasive Devices       Peripheral Intravenous Line  Duration             Peripheral IV 01/21/24 Proximal;Right;Ventral (anterior) Forearm 2 days              Drain  Duration             Colostomy LLQ 5 days    Suprapubic Catheter 5 days                    Physical Exam:   Gen: AAOx3, NAD  Right Chest wound in axillary line measuring approx 6x5cm with 7cm of undermining from 10-12 o'clock. Wound clean, granulating base.    Lab, Imaging and other studies:CBC:   Lab Results   Component Value Date    WBC 4.13 (L) 01/23/2024    HGB 11.1 (L) 01/23/2024    HCT 40.5 01/23/2024    MCV 84 01/23/2024     01/23/2024    RBC 4.81 01/23/2024    MCH 23.1 (L) 01/23/2024    MCHC 27.4 (L) 01/23/2024    RDW 21.0 (H) 01/23/2024    MPV 11.0 01/23/2024    NRBC 1 01/23/2024     VTE Pharmacologic Prophylaxis: Enoxaparin (Lovenox)  VTE Mechanical Prophylaxis: sequential compression device  "

## 2024-01-23 NOTE — PLAN OF CARE
Problem: Potential for Falls  Goal: Patient will remain free of falls  Description: INTERVENTIONS:  - Educate patient/family on patient safety including physical limitations  - Instruct patient to call for assistance with activity   - Consult OT/PT to assist with strengthening/mobility   - Keep Call bell within reach  - Keep bed low and locked with side rails adjusted as appropriate  - Keep care items and personal belongings within reach  - Initiate and maintain comfort rounds  - Make Fall Risk Sign visible to staff  - Apply yellow socks and bracelet for high fall risk patients  - Consider moving patient to room near nurses station  Outcome: Progressing     Problem: PAIN - ADULT  Goal: Verbalizes/displays adequate comfort level or baseline comfort level  Description: Interventions:  - Encourage patient to monitor pain and request assistance  - Assess pain using appropriate pain scale  - Administer analgesics based on type and severity of pain and evaluate response  - Implement non-pharmacological measures as appropriate and evaluate response  - Consider cultural and social influences on pain and pain management  - Notify physician/advanced practitioner if interventions unsuccessful or patient reports new pain  Outcome: Progressing     Problem: INFECTION - ADULT  Goal: Absence or prevention of progression during hospitalization  Description: INTERVENTIONS:  - Assess and monitor for signs and symptoms of infection  - Monitor lab/diagnostic results  - Monitor all insertion sites, i.e. indwelling lines, tubes, and drains  - Monitor endotracheal if appropriate and nasal secretions for changes in amount and color  - [place] appropriate cooling/warming therapies per order  - Administer medications as ordered  - Instruct and encourage patient and family to use good hand hygiene technique  - Identify and instruct in appropriate isolation precautions for identified infection/condition  Outcome: Progressing  Goal: Absence  of fever/infection during neutropenic period  Description: INTERVENTIONS:  - Monitor WBC    Outcome: Progressing     Problem: SAFETY ADULT  Goal: Patient will remain free of falls  Description: INTERVENTIONS:  - Educate patient/family on patient safety including physical limitations  - Instruct patient to call for assistance with activity   - Consult OT/PT to assist with strengthening/mobility   - Keep Call bell within reach  - Keep bed low and locked with side rails adjusted as appropriate  - Keep care items and personal belongings within reach  - Initiate and maintain comfort rounds  - Make Fall Risk Sign visible to staff  - Apply yellow socks and bracelet for high fall risk patients  - Consider moving patient to room near nurses station  Outcome: Progressing  Goal: Maintain or return to baseline ADL function  Description: INTERVENTIONS:  -  Assess patient's ability to carry out ADLs; assess patient's baseline for ADL function and identify physical deficits which impact ability to perform ADLs (bathing, care of mouth/teeth, toileting, grooming, dressing, etc.)  - Assess/evaluate cause of self-care deficits   - Assess range of motion  - Assess patient's mobility; develop plan if impaired  - Assess patient's need for assistive devices and provide as appropriate  - Encourage maximum independence but intervene and supervise when necessary  - Involve family in performance of ADLs  - Assess for home care needs following discharge   - Consider OT consult to assist with ADL evaluation and planning for discharge  - Provide patient education as appropriate  Outcome: Progressing  Goal: Maintains/Returns to pre admission functional level  Description: INTERVENTIONS:  - Perform AM-PAC 6 Click Basic Mobility/ Daily Activity assessment daily.  - Set and communicate daily mobility goal to care team and patient/family/caregiver.   - Collaborate with rehabilitation services on mobility goals if consulted  - Out of bed for  toileting  - Record patient progress and toleration of activity level   Outcome: Progressing     Problem: DISCHARGE PLANNING  Goal: Discharge to home or other facility with appropriate resources  Description: INTERVENTIONS:  - Identify barriers to discharge w/patient and caregiver  - Arrange for needed discharge resources and transportation as appropriate  - Identify discharge learning needs (meds, wound care, etc.)  - Arrange for interpretive services to assist at discharge as needed  - Refer to Case Management Department for coordinating discharge planning if the patient needs post-hospital services based on physician/advanced practitioner order or complex needs related to functional status, cognitive ability, or social support system  Outcome: Progressing     Problem: Knowledge Deficit  Goal: Patient/family/caregiver demonstrates understanding of disease process, treatment plan, medications, and discharge instructions  Description: Complete learning assessment and assess knowledge base.  Interventions:  - Provide teaching at level of understanding  - Provide teaching via preferred learning methods  Outcome: Progressing     Problem: Prexisting or High Potential for Compromised Skin Integrity  Goal: Skin integrity is maintained or improved  Description: INTERVENTIONS:  - Identify patients at risk for skin breakdown  - Assess and monitor skin integrity  - Assess and monitor nutrition and hydration status  - Monitor labs   - Assess for incontinence   - Turn and reposition patient  - Assist with mobility/ambulation  - Relieve pressure over bony prominences  - Avoid friction and shearing  - Provide appropriate hygiene as needed including keeping skin clean and dry  - Evaluate need for skin moisturizer/barrier cream  - Collaborate with interdisciplinary team   - Patient/family teaching  - Consider wound care consult   Outcome: Progressing     Problem: Nutrition/Hydration-ADULT  Goal: Nutrient/Hydration intake  appropriate for improving, restoring or maintaining nutritional needs  Description: Monitor and assess patient's nutrition/hydration status for malnutrition. Collaborate with interdisciplinary team and initiate plan and interventions as ordered.  Monitor patient's weight and dietary intake as ordered or per policy. Utilize nutrition screening tool and intervene as necessary. Determine patient's food preferences and provide high-protein, high-caloric foods as appropriate.     INTERVENTIONS:  - Monitor oral intake, urinary output, labs, and treatment plans  - Assess nutrition and hydration status and recommend course of action  - Evaluate amount of meals eaten  - Assist patient with eating if necessary   - Allow adequate time for meals  - Recommend/ encourage appropriate diets, oral nutritional supplements, and vitamin/mineral supplements  - Order, calculate, and assess calorie counts as needed  - Recommend, monitor, and adjust tube feedings and TPN/PPN based on assessed needs  - Assess need for intravenous fluids  - Provide specific nutrition/hydration education as appropriate  - Include patient/family/caregiver in decisions related to nutrition  Outcome: Progressing

## 2024-01-23 NOTE — PROGRESS NOTES
"Thoracic Surgery   Progress Note   Rikki Farooqcat 64 y.o. male MRN: [id number]  Unit/Bed#: [facility] 823-01 Encounter: [id number]    Assessment:  64 y.o. male who presents with a right chest wall wound. Concerns for osteomyelitis.     AVSS on room air  UOP: 350 cc p.m. shift    WBC pending from 3.81  Hgb pending from 9.3  Plts pending from 345  Cr pending from 0.39     Wound Culture: Beta hemolytic streptococcus group G   repeat blood cx NG x24hr    Plan:  -Continue regular diet   -Ensures for supplemental nutrition  -Continue Ancef, ID on board, appreciate recs  -Per ID: After flap closure, patient will need long-term IV antibiotic, guided by bone culture result   -OR  for chest wall debridement with rib resection followed by muscle flap coverage   -Daily packing changes to R chest wall  -SQH  -Pain/ nausea control PRN  -OOB/ ambulation  -Incentive Spirometry      Subjective/Objective     Subjective:   Patient seen and examined at bedside, in no acute distress. No acute events overnight.  Patient reports some pain near his wound.  Patient denies nausea vomiting fever chills chest pain or shortness of breath.  Patient is tolerating his diet.     Objective:   Vitals:Blood pressure 157/84, pulse 56, temperature 98.6 °F (37 °C), resp. rate 17, height 5' 7\" (1.702 m), weight 65.8 kg (145 lb), SpO2 96%.  Temp (24hrs), Av.2 °F (36.8 °C), Min:97.5 °F (36.4 °C), Max:98.6 °F (37 °C)      I/O          07 0700  0701   0700    P.O. 360 358    Total Intake(mL/kg) 360 (5.5) 358 (5.4)    Urine (mL/kg/hr) 2050 (1.3) 350 (0.2)    Stool 0     Total Output  350    Net -1690 +8          Unmeasured Stool Occurrence 1 x             Invasive Devices       Peripheral Intravenous Line  Duration             Peripheral IV 24 Proximal;Right;Ventral (anterior) Forearm 2 days              Drain  Duration             Colostomy LLQ 5 days    Suprapubic Catheter 5 days                    Physical " Exam:  General: No acute distress  Neuro: Awake, Alert and Oriented x 3  HEENT:  Normocephalic, atraumatic, moist mucous membranes  CV: Regular rate and rhythm  Lungs: Normal work of breathing, no increased respiratory effort  Chest: R chest wall wound w/ packing replaced, nontender and no fluctuance  Abdomen: Soft, non-tender, non-distended  Extremities: No edema, clubbing or cyanosis  Skin: Warm, dry        Lab Results   Component Value Date    WBC 3.81 (L) 01/22/2024    HGB 9.3 (L) 01/22/2024    HCT 32.1 (L) 01/22/2024    MCV 79 (L) 01/22/2024     01/22/2024      Lab Results   Component Value Date     02/17/2014    SODIUM 139 01/22/2024    K 3.9 01/22/2024     01/22/2024    CO2 30 01/22/2024    ANIONGAP 8 02/17/2014    AGAP 5 01/22/2024    BUN 17 01/22/2024    CREATININE 0.39 (L) 01/22/2024    GLUC 85 01/22/2024    GLUF 62 (L) 08/29/2023    CALCIUM 8.5 01/22/2024    AST 11 (L) 01/18/2024    ALT 7 01/18/2024    ALKPHOS 77 01/18/2024    PROT 8.0 02/17/2014    TP 8.6 (H) 01/18/2024    BILITOT 0.3 02/17/2014    TBILI 0.39 01/18/2024    EGFR 127 01/22/2024       VTE Prophylaxis: Heparin        Akhil Mlils MD  1/23/2024  5:12 AM

## 2024-01-24 ENCOUNTER — ANESTHESIA EVENT (INPATIENT)
Dept: PERIOP | Facility: HOSPITAL | Age: 64
DRG: 629 | End: 2024-01-24
Payer: MEDICARE

## 2024-01-24 LAB
ABO GROUP BLD: NORMAL
BACTERIA BLD CULT: NORMAL
BACTERIA BLD CULT: NORMAL
BLD GP AB SCN SERPL QL: NEGATIVE
GLUCOSE SERPL-MCNC: 110 MG/DL (ref 65–140)
GLUCOSE SERPL-MCNC: 82 MG/DL (ref 65–140)
GLUCOSE SERPL-MCNC: 95 MG/DL (ref 65–140)
RH BLD: POSITIVE
SPECIMEN EXPIRATION DATE: NORMAL

## 2024-01-24 PROCEDURE — 86850 RBC ANTIBODY SCREEN: CPT | Performed by: STUDENT IN AN ORGANIZED HEALTH CARE EDUCATION/TRAINING PROGRAM

## 2024-01-24 PROCEDURE — 86901 BLOOD TYPING SEROLOGIC RH(D): CPT | Performed by: STUDENT IN AN ORGANIZED HEALTH CARE EDUCATION/TRAINING PROGRAM

## 2024-01-24 PROCEDURE — NC001 PR NO CHARGE: Performed by: THORACIC SURGERY (CARDIOTHORACIC VASCULAR SURGERY)

## 2024-01-24 PROCEDURE — 82948 REAGENT STRIP/BLOOD GLUCOSE: CPT

## 2024-01-24 PROCEDURE — 86900 BLOOD TYPING SEROLOGIC ABO: CPT | Performed by: STUDENT IN AN ORGANIZED HEALTH CARE EDUCATION/TRAINING PROGRAM

## 2024-01-24 PROCEDURE — 99232 SBSQ HOSP IP/OBS MODERATE 35: CPT | Performed by: INTERNAL MEDICINE

## 2024-01-24 PROCEDURE — 86923 COMPATIBILITY TEST ELECTRIC: CPT

## 2024-01-24 RX ADMIN — HYDROMORPHONE HYDROCHLORIDE 0.5 MG: 1 INJECTION, SOLUTION INTRAMUSCULAR; INTRAVENOUS; SUBCUTANEOUS at 14:49

## 2024-01-24 RX ADMIN — Medication 250 MG: at 08:32

## 2024-01-24 RX ADMIN — HYDROMORPHONE HYDROCHLORIDE 0.5 MG: 1 INJECTION, SOLUTION INTRAMUSCULAR; INTRAVENOUS; SUBCUTANEOUS at 21:19

## 2024-01-24 RX ADMIN — HEPARIN SODIUM 5000 UNITS: 5000 INJECTION INTRAVENOUS; SUBCUTANEOUS at 13:05

## 2024-01-24 RX ADMIN — HYDROMORPHONE HYDROCHLORIDE 0.5 MG: 1 INJECTION, SOLUTION INTRAMUSCULAR; INTRAVENOUS; SUBCUTANEOUS at 01:50

## 2024-01-24 RX ADMIN — OXYCODONE HYDROCHLORIDE 10 MG: 10 TABLET ORAL at 00:41

## 2024-01-24 RX ADMIN — OXYCODONE HYDROCHLORIDE 10 MG: 10 TABLET ORAL at 13:41

## 2024-01-24 RX ADMIN — HEPARIN SODIUM 5000 UNITS: 5000 INJECTION INTRAVENOUS; SUBCUTANEOUS at 21:19

## 2024-01-24 RX ADMIN — PANTOPRAZOLE SODIUM 40 MG: 40 TABLET, DELAYED RELEASE ORAL at 05:33

## 2024-01-24 RX ADMIN — CEFAZOLIN SODIUM 1000 MG: 1 SOLUTION INTRAVENOUS at 13:06

## 2024-01-24 RX ADMIN — HEPARIN SODIUM 5000 UNITS: 5000 INJECTION INTRAVENOUS; SUBCUTANEOUS at 05:33

## 2024-01-24 RX ADMIN — OXYCODONE HYDROCHLORIDE 10 MG: 10 TABLET ORAL at 20:06

## 2024-01-24 RX ADMIN — OXYCODONE HYDROCHLORIDE 10 MG: 10 TABLET ORAL at 05:33

## 2024-01-24 RX ADMIN — Medication 250 MG: at 17:17

## 2024-01-24 RX ADMIN — CEFAZOLIN SODIUM 1000 MG: 1 SOLUTION INTRAVENOUS at 05:33

## 2024-01-24 RX ADMIN — ASPIRIN 81 MG: 81 TABLET, COATED ORAL at 08:32

## 2024-01-24 RX ADMIN — CEFAZOLIN SODIUM 1000 MG: 1 SOLUTION INTRAVENOUS at 21:19

## 2024-01-24 NOTE — WOUND OSTOMY CARE
Progress Note - Wound   Rikki RANDAL Moscat 64 y.o. male MRN: [id number]  Unit/Bed#: [facility] 823-01 Encounter: [id number]        Assessment:   Patient seen today for wound care follow up assessment. Patient has supra pubic catheter and colostomy for urine and stool management. Patient is mod assist with turning from side to side. Patient is independent with meals. Patient is currently on P-500 specialty bed.    Left heel intact and blanching, preventative foam in place.    Left buttocks- wound is full thickness with pink granular tissue, three small areas measured as one, periwound skin is pink scar tissue, small serosanguineous drainage.   POA unstageable perineum- wound is full thickness with 60% slough tissue and 40% beefy red tissue, periwound skin is fragile, small serosanguineous drainage present.   POA stage IV left ischium- wound appears to be mostly pink granular tissue with small area of slough tissue, periwound is macerated, significant undermining noted.     Patient to have chest wall reconstruction tomorrow with plastics team.     No induration, fluctuance, odor, warmth/temperature differences, redness, or purulence noted to the above noted wounds and skin areas assessed. New dressings applied per orders listed below. Patient tolerated well- no s/s of non-verbal pain or discomfort observed during the encounter. Bedside nurse aware of plan of care. See flow sheets for more detailed assessment findings. Wound care will continue to follow.       Skin Care orders:  1-Silicone bordered foam to the left heel parminder with a P and date and check skin integrity every shift change every 3 days   2-Roho  cushion when out of bed in chair.  3-Moisturize skin daily with skin nourishing cream  4-Elevate Left heel to offload pressure.  5-Turn/reposition q2h for pressure re-distribution on skin  6. Right flank wound - cleanse with Normal saline then pack with wet to dry jagjit then secure with the silicone bordered foam change  daily   7. Left  buttocks wound - cleanse with Normal saline then apply meglisorb calcium alginate then top with the silicone bordered foam change every other day   8. Left ischial wound - cleanse with Normal saline then pack with wet to dry jagjit and secure with the silicone bordered foam change daily   9. Posterior perineum cleanse with soap and water then apply triad paste to the open area TID and prn   10 P - 500 low air loss mattress     Wound 08/26/20 Pressure Injury Buttocks Left (Active)   Wound Image   01/24/24 0931   Wound Description Beefy red 01/24/24 0931   Pressure Injury Stage 4 01/24/24 0931   [place]-wound Assessment Maceration 01/24/24 0931   Wound Length (cm) 4 cm 01/24/24 0931   Wound Width (cm) 2 cm 01/24/24 0931   Wound Depth (cm) 1.4 cm 01/24/24 0931   Wound Surface Area (cm^2) 8 cm^2 01/24/24 0931   Wound Volume (cm^3) 11.2 cm^3 01/24/24 0931   Calculated Wound Volume (cm^3) 11.2 cm^3 01/24/24 0931   Change in Wound Size % 61.11 01/24/24 0931   Number of underminings 1 01/24/24 0931   Undermining 1 9.5 01/24/24 0931   Undermining 1 is depth extending from 3-12 01/24/24 0931   Wound Site Closure DISHA 01/24/24 0931   Drainage Amount Moderate 01/24/24 0931   Drainage Description Serosanguineous 01/24/24 0931   Non-staged Wound Description Full thickness 01/24/24 0931   Treatments Cleansed 01/24/24 0931   Dressing Foam, Silicon (eg. Allevyn, etc);Packings 01/24/24 0931   Wound packed? Yes 01/24/24 0931   Packing- # removed 1 01/24/24 0931   Packing- # inserted 1 01/24/24 0931   Dressing Changed New 01/24/24 0931   Patient Tolerance Tolerated well 01/24/24 0931   Dressing Status Clean;Dry;Intact 01/24/24 0931       Wound 11/05/21 Pressure Injury Perineum (Active)   Wound Image   01/24/24 0929   Wound Description Beefy red;Slough 01/24/24 0929   Pressure Injury Stage 4 01/24/24 0929   [place]-wound Assessment Scar Tissue 01/24/24 0929   Wound Length (cm) 5 cm 01/24/24 0929   Wound Width (cm) 2 cm 01/24/24  0929   Wound Depth (cm) 0.6 cm 01/24/24 0929   Wound Surface Area (cm^2) 10 cm^2 01/24/24 0929   Wound Volume (cm^3) 6 cm^3 01/24/24 0929   Calculated Wound Volume (cm^3) 6 cm^3 01/24/24 0929   Change in Wound Size % -5900 01/24/24 0929   Wound Site Closure DISHA 01/24/24 0929   Drainage Amount Small 01/24/24 0929   Drainage Description Serosanguineous 01/24/24 0929   Non-staged Wound Description Full thickness 01/24/24 0929   Treatments Cleansed 01/24/24 0929   Dressing Moisture barrier 01/24/24 0929   Wound packed? No 01/24/24 0929   Packing- # removed 0 01/24/24 0929   Packing- # inserted 0 01/24/24 0929   Dressing Changed New 01/24/24 0929   Patient Tolerance Tolerated well 01/24/24 0929   Dressing Status Clean;Dry;Intact 01/24/24 0929       Wound 01/17/24 Pressure Injury Flank Right;Upper (Active)   Wound Image   01/18/24 1128   Wound Description DISHA 01/24/24 0726   Pressure Injury Stage 4 01/18/24 1128   [place]-wound Assessment DISHA 01/24/24 0726   Wound Length (cm) 6 cm 01/18/24 1128   Wound Width (cm) 2.8 cm 01/18/24 1128   Wound Depth (cm) 1.7 cm 01/18/24 1128   Wound Surface Area (cm^2) 16.8 cm^2 01/18/24 1128   Wound Volume (cm^3) 28.56 cm^3 01/18/24 1128   Calculated Wound Volume (cm^3) 28.56 cm^3 01/18/24 1128   Number of underminings 1 01/18/24 1128   Undermining 1 4 01/18/24 1128   Undermining 1 is depth extending from 11-7 01/18/24 1128   Drainage Amount Moderate 01/18/24 1128   Drainage Description Serosanguineous 01/18/24 1128   Non-staged Wound Description Full thickness 01/18/24 1128   Treatments Cleansed;Irrigation with NSS;Site care 01/18/24 1128   Dressing Gauze;ABD 01/24/24 0726   Wound packed? Yes 01/19/24 2032   Dressing Changed Changed by provider 01/23/24 1230   Patient Tolerance Tolerated well 01/23/24 1230   Dressing Status Clean;Dry;Intact 01/24/24 0726       Wound 01/17/24 Knee Anterior;Left (Active)   Wound Image   01/18/24 1134   Wound Description Clean;Dry;Intact 01/24/24 6541    [place]-wound Assessment Clean;Dry;Intact 01/24/24 0726   Wound Length (cm) 0 cm 01/18/24 1134   Wound Width (cm) 0 cm 01/18/24 1134   Wound Depth (cm) 0 cm 01/18/24 1134   Wound Surface Area (cm^2) 0 cm^2 01/18/24 1134   Wound Volume (cm^3) 0 cm^3 01/18/24 1134   Calculated Wound Volume (cm^3) 0 cm^3 01/18/24 1134   Drainage Amount None 01/24/24 0726   Non-staged Wound Description Partial thickness 01/17/24 1940   Treatments Site care 01/18/24 1134   Dressing Open to air 01/24/24 0726   Dressing Changed Reinforced 01/17/24 1940   Patient Tolerance Tolerated well 01/18/24 1134   Dressing Status Clean;Dry;Intact 01/24/24 0726       Wound 01/18/24 Buttocks Left (Active)   Wound Image   01/24/24 0934   Wound Description Beefy red;Epithelialization;Fragile 01/24/24 0930   [place]-wound Assessment Pink;Scar Tissue 01/24/24 0930   Wound Length (cm) 8 cm 01/24/24 0934   Wound Width (cm) 1 cm 01/24/24 0934   Wound Depth (cm) 0.2 cm 01/24/24 0934   Wound Surface Area (cm^2) 8 cm^2 01/24/24 0934   Wound Volume (cm^3) 1.6 cm^3 01/24/24 0934   Calculated Wound Volume (cm^3) 1.6 cm^3 01/24/24 0934   Change in Wound Size % -201.89 01/24/24 0934   Wound Site Closure DISHA 01/24/24 0930   Drainage Amount Small 01/24/24 0930   Drainage Description Serosanguineous 01/24/24 0930   Non-staged Wound Description Full thickness 01/24/24 0930   Treatments Cleansed 01/24/24 0930   Dressing Foam, Silicon (eg. Allevyn, etc);Calcium Alginate 01/24/24 0930   Wound packed? No 01/24/24 0930   Packing- # removed 0 01/24/24 0930   Packing- # inserted 0 01/24/24 0930   Dressing Changed New 01/24/24 0930   Patient Tolerance Tolerated well 01/24/24 0930   Dressing Status Clean;Dry;Intact 01/24/24 0930         Yessica Hubbard BSN, RN, CWOCN

## 2024-01-24 NOTE — PLAN OF CARE
Problem: Potential for Falls  Goal: Patient will remain free of falls  Description: INTERVENTIONS:  - Educate patient/family on patient safety including physical limitations  - Instruct patient to call for assistance with activity   - Consult OT/PT to assist with strengthening/mobility   - Keep Call bell within reach  - Keep bed low and locked with side rails adjusted as appropriate  - Keep care items and personal belongings within reach  - Initiate and maintain comfort rounds  - Make Fall Risk Sign visible to staff  - Apply yellow socks and bracelet for high fall risk patients  - Consider moving patient to room near nurses station  Outcome: Progressing     Problem: PAIN - ADULT  Goal: Verbalizes/displays adequate comfort level or baseline comfort level  Description: Interventions:  - Encourage patient to monitor pain and request assistance  - Assess pain using appropriate pain scale  - Administer analgesics based on type and severity of pain and evaluate response  - Implement non-pharmacological measures as appropriate and evaluate response  - Consider cultural and social influences on pain and pain management  - Notify physician/advanced practitioner if interventions unsuccessful or patient reports new pain  Outcome: Progressing     Problem: INFECTION - ADULT  Goal: Absence or prevention of progression during hospitalization  Description: INTERVENTIONS:  - Assess and monitor for signs and symptoms of infection  - Monitor lab/diagnostic results  - Monitor all insertion sites, i.e. indwelling lines, tubes, and drains  - Monitor endotracheal if appropriate and nasal secretions for changes in amount and color  - [place] appropriate cooling/warming therapies per order  - Administer medications as ordered  - Instruct and encourage patient and family to use good hand hygiene technique  - Identify and instruct in appropriate isolation precautions for identified infection/condition  Outcome: Progressing  Goal: Absence  of fever/infection during neutropenic period  Description: INTERVENTIONS:  - Monitor WBC    Outcome: Progressing     Problem: SAFETY ADULT  Goal: Patient will remain free of falls  Description: INTERVENTIONS:  - Educate patient/family on patient safety including physical limitations  - Instruct patient to call for assistance with activity   - Consult OT/PT to assist with strengthening/mobility   - Keep Call bell within reach  - Keep bed low and locked with side rails adjusted as appropriate  - Keep care items and personal belongings within reach  - Initiate and maintain comfort rounds  - Make Fall Risk Sign visible to staff  - Apply yellow socks and bracelet for high fall risk patients  - Consider moving patient to room near nurses station  Outcome: Progressing     Problem: DISCHARGE PLANNING  Goal: Discharge to home or other facility with appropriate resources  Description: INTERVENTIONS:  - Identify barriers to discharge w/patient and caregiver  - Arrange for needed discharge resources and transportation as appropriate  - Identify discharge learning needs (meds, wound care, etc.)  - Arrange for interpretive services to assist at discharge as needed  - Refer to Case Management Department for coordinating discharge planning if the patient needs post-hospital services based on physician/advanced practitioner order or complex needs related to functional status, cognitive ability, or social support system  Outcome: Progressing     Problem: Knowledge Deficit  Goal: Patient/family/caregiver demonstrates understanding of disease process, treatment plan, medications, and discharge instructions  Description: Complete learning assessment and assess knowledge base.  Interventions:  - Provide teaching at level of understanding  - Provide teaching via preferred learning methods  Outcome: Progressing     Problem: Prexisting or High Potential for Compromised Skin Integrity  Goal: Skin integrity is maintained or  improved  Description: INTERVENTIONS:  - Identify patients at risk for skin breakdown  - Assess and monitor skin integrity  - Assess and monitor nutrition and hydration status  - Monitor labs   - Assess for incontinence   - Turn and reposition patient  - Assist with mobility/ambulation  - Relieve pressure over bony prominences  - Avoid friction and shearing  - Provide appropriate hygiene as needed including keeping skin clean and dry  - Evaluate need for skin moisturizer/barrier cream  - Collaborate with interdisciplinary team   - Patient/family teaching  - Consider wound care consult   Outcome: Progressing     Problem: Nutrition/Hydration-ADULT  Goal: Nutrient/Hydration intake appropriate for improving, restoring or maintaining nutritional needs  Description: Monitor and assess patient's nutrition/hydration status for malnutrition. Collaborate with interdisciplinary team and initiate plan and interventions as ordered.  Monitor patient's weight and dietary intake as ordered or per policy. Utilize nutrition screening tool and intervene as necessary. Determine patient's food preferences and provide high-protein, high-caloric foods as appropriate.     INTERVENTIONS:  - Monitor oral intake, urinary output, labs, and treatment plans  - Assess nutrition and hydration status and recommend course of action  - Evaluate amount of meals eaten  - Assist patient with eating if necessary   - Allow adequate time for meals  - Recommend/ encourage appropriate diets, oral nutritional supplements, and vitamin/mineral supplements  - Order, calculate, and assess calorie counts as needed  - Recommend, monitor, and adjust tube feedings and TPN/PPN based on assessed needs  - Assess need for intravenous fluids  - Provide specific nutrition/hydration education as appropriate  - Include patient/family/caregiver in decisions related to nutrition  Outcome: Progressing

## 2024-01-24 NOTE — PROGRESS NOTES
Progress Note - Infectious Disease   Rikki TEE Moscat 64 y.o. male MRN: [id number]  Unit/Bed#: [facility] 823-01 Encounter: [id number]      Impression/Plan:    65 y/o M with long-term spinal related paraplegia complicated by multiple pressure related ulcers with MRSA colonization and suprapubic catheter/colostomy need with recurrent ESBL/MDRO UTI here with right chest wall wound complicated by OM.  Wound noted in early December, and confirmed on CT on 12/22 showing fracture of right lateral 6th-8th ribs and new pleural effusion but without drainable abscess.  Patient was however stable at this time and therefore Gen Surg did not believe that he required urgent admission, which is why he presents weeks since the CT.     Patient has an open wound with exposed bone on his right mid lateral/near posterior chest wall.  Wound cultures of grown and isolated 4+ GBS.  For this reason we have made it clear that antibiotics are not recommended until this wound is closed and bone covered.  However patient did have an episode of fever and leukopenia on early 1/19 a.m. and was therefore started on Ceftriaxone per [facility]. ID has since recommended de-escalating to cefazolin.     Otherwise patient is planned for OR on Thursday. Afterwards he will require IV abx course as mentioned below. Can likely c/w Cefazolin pending final results from bone bx.     # Open Pressure Ulcer w/ Exposed Bone on Lateral R Mid-chest Wall w/o Evidence of Cellulitis  Lat R 6th-8th Rib Fx c/b OM underlying Open Wound  Wclx with 4+ GBS  Wound care on-board, remains covered and with pressure dressings  Will require surgical tx of wound/OM f/b 6 week abx course  If Bone Bx (-) for Bacteria --> C/w Cefazolin  If Bone Bx (+) for any Bacteria other than GBS --> Consider change in Abx regimen  For OR per [facility]/Plastics Tomorrow  Will perform rib excision, wound closure, and chest wall debridement     # H/o Paraplegia   C/b Frequent Pressure-related Wounds, h/o  MRSA Colonization  C/b Detrussor Dysfxn now s/p Suprapubic Cather, Reccurrent UTI with h/o ESBL/MDRO  Patient has regular follow-up with medical and surgical wound care outpatient.  Additionally has 24/7 wound care home health  Has had lengthy discussion with Plastics regarding how to avoid future chest wall wound and ensure that post-op wound healing is successful     # Wheelchair-bound  This is related to patient's paraplegia and post AKA status  Seems that patient spends a good amount of time in his wheelchair, i.e. enough to cause this right lateral mid chest wall pressure wound  Patient D/W Plastics re: strategies to ensure wound healing and avoid future wounds     # H/o T2DM, well-controlled, non-insulin dependent  A1c well below 7% per last check  Will likely impact wound healing overall, however should not impair wound to completely close  Non-insulin-dependent which avoid additional alleys of infection  Mgmt per primary team     # HTN  HLD  NormoTNive so far throughout hospitalization  Is on antiHTN and statin at home, would continue while IP  Mgmt per primary team        Plan:     C/w IV Cefazolin now & for 6 Weeks Post-op  F/U surgical clx's --> consider broadening abx if clx (+), however if clx (-) may c/w Cefazolin post-op  Pain mgmt, DVT ppx per primary team  Monitor fever/WBC curves     WILL DISCUSS FINAL PLANS ON ROUNDS AND UPDATE TEAMS ACCORDINGLY. Please wait for final recommendations from Dr. Salomon.  ID consult service will continue to follow.     I have spent 75/55 or 50/35 minutes with Patient/family, other providers and in review of records today in completing this visit. Total time spent included review of testing, ordering medications and tests, communicating with other providers, documentation time and counseling/providing education to patient, family, caregiver and coordination of outpatient care as detailed in my note.      Antibiotics:     Current:  IV Cefazolin, D5  Previous:  IV CTX x 1  dose    24 Hour events:    NAEO, Mr Arguello was seen earlier today resting comfortably in hospital bed. He is currently without any new complaints, states that his R chest wound is somewhat tender but has not changed in severity and is still without any obvious drainage. Remained afebrile ovn, WBC still <5k.    Subjective    Patient has no fever, chills, sweats; no nausea, vomiting, diarrhea; no cough, shortness of breath; no pain. No new symptoms.      Objective:  Vitals:  Temp:  [98.3 °F (36.8 °C)-98.9 °F (37.2 °C)] 98.3 °F (36.8 °C)  HR:  [71-97] 77  Resp:  [16-17] 16  BP: (102-109)/(60-71) 104/60  SpO2:  [94 %-97 %] 94 %  Temp (24hrs), Av.6 °F (37 °C), Min:98.3 °F (36.8 °C), Max:98.9 °F (37.2 °C)  Current: Temperature: 98.3 °F (36.8 °C)    Physical Exam:   General Appearance:  Alert, interactive, nontoxic, no acute distress.   Throat: Oropharynx moist without lesions.    Lungs:   Clear to auscultation bilaterally; no wheezes, rhonchi or rales; respirations unlabored   Heart:  RRR; no murmur, rub or gallop   Abdomen:   Soft, non-tender, non-distended, positive bowel sounds.     Extremities: No clubbing, cyanosis or edema   Skin: Large, open wound on lateral R chest wall. Covered by pressure dressing on exam.        Labs, Imaging, & Other studies:   All pertinent labs and imaging studies were personally reviewed as below.  Results from last 7 days   Lab Units 24  0647 24  1610 24  0449   WBC Thousand/uL 4.13* 3.81* 4.72   HEMOGLOBIN g/dL 11.1* 9.3* 10.0*   PLATELETS Thousands/uL 283 345 295     Results from last 7 days   Lab Units 24  0647 24  0457 24  1024   POTASSIUM mmol/L 4.1   < > 3.6   CHLORIDE mmol/L 105   < > 100   CO2 mmol/L 27   < > 26   BUN mg/dL 15   < > 19   CREATININE mg/dL 0.41*   < > 0.44*   EGFR ml/min/1.73sq m 125   < > 121   CALCIUM mg/dL 9.1   < > 8.9   AST U/L  --   --  11*   ALT U/L  --   --  7   ALK PHOS U/L  --   --  77    < > = values in this interval  not displayed.     Results from last 7 days   Lab Units 01/18/24  2305 01/18/24  1024 01/17/24  1747   BLOOD CULTURE  No Growth After 5 Days.  No Growth After 5 Days. No Growth After 5 Days.  --    GRAM STAIN RESULT   --   --  2+ Gram positive cocci in pairs and chains*  No polys seen*   WOUND CULTURE   --   --  4+ Growth of Beta Hemolytic Streptococcus Group G*  3+ Growth of       Miguel Candelaria MD  Internal Medicine Residency PGY-2  [facility] [address]

## 2024-01-24 NOTE — PROGRESS NOTES
"Thoracic Surgery   Progress Note   Rikki Farooqcat 64 y.o. male MRN: [id number]  Unit/Bed#: [facility] 823-01 Encounter: [id number]    Assessment:  64 y.o. male who presents with a right chest wall wound. Concerns for osteomyelitis.     AVSS on room air  UOP: 2325 cc    WBC pending from 4.13  Hgb pending from 11.1  Plts pending from 283  Cr pending from 0.41     Wound Culture: Beta hemolytic streptococcus group G   repeat blood cx NG x24hr    Plan:  -Continue regular diet   -Ensures for supplemental nutrition  -Continue Ancef, ID on board, appreciate recs  -Per ID: After flap closure, patient will need long-term IV antibiotic, guided by bone culture result   -OR  for chest wall debridement with rib resection followed by muscle flap coverage   -Daily packing changes to R chest wall  -SQH  -Pain/ nausea control PRN  -OOB/ ambulation  -Incentive Spirometry      Subjective/Objective     Subjective:   Patient seen and examined at bedside, in no acute distress. No acute events overnight.  Patient reports some pain near his wound.  Patient denies nausea vomiting fever chills chest pain or shortness of breath.  Patient is tolerating his diet.     Objective:   Vitals:Blood pressure 109/71, pulse 97, temperature 98.9 °F (37.2 °C), resp. rate 17, height 5' 7\" (1.702 m), weight 65.8 kg (145 lb), SpO2 97%.  Temp (24hrs), Av.6 °F (37 °C), Min:98.2 °F (36.8 °C), Max:98.9 °F (37.2 °C)      I/O          07 0700  0701   0700    P.O. 360 358    Total Intake(mL/kg) 360 (5.5) 358 (5.4)    Urine (mL/kg/hr) 2050 (1.3) 350 (0.2)    Stool 0     Total Output  350    Net -1690 +8          Unmeasured Stool Occurrence 1 x             Invasive Devices       Peripheral Intravenous Line  Duration             Peripheral IV 24 Proximal;Right;Ventral (anterior) Forearm 3 days              Drain  Duration             Colostomy LLQ 6 days    Suprapubic Catheter 6 days                    Physical " Exam:  General: No acute distress  Neuro: Awake, Alert and Oriented x 3  HEENT:  Normocephalic, atraumatic, moist mucous membranes  CV: Regular rate and rhythm  Lungs: Normal work of breathing, no increased respiratory effort  Chest: R chest wall wound w/ packing replaced, nontender and no fluctuance  Abdomen: Soft, non-tender, non-distended  Extremities: No edema, clubbing or cyanosis  Skin: Warm, dry        Lab Results   Component Value Date    WBC 4.13 (L) 01/23/2024    HGB 11.1 (L) 01/23/2024    HCT 40.5 01/23/2024    MCV 84 01/23/2024     01/23/2024      Lab Results   Component Value Date     02/17/2014    SODIUM 138 01/23/2024    K 4.1 01/23/2024     01/23/2024    CO2 27 01/23/2024    ANIONGAP 8 02/17/2014    AGAP 6 01/23/2024    BUN 15 01/23/2024    CREATININE 0.41 (L) 01/23/2024    GLUC 88 01/23/2024    GLUF 62 (L) 08/29/2023    CALCIUM 9.1 01/23/2024    AST 11 (L) 01/18/2024    ALT 7 01/18/2024    ALKPHOS 77 01/18/2024    PROT 8.0 02/17/2014    TP 8.6 (H) 01/18/2024    BILITOT 0.3 02/17/2014    TBILI 0.39 01/18/2024    EGFR 125 01/23/2024       VTE Prophylaxis: Heparin        Akhil Mills MD  1/24/2024  6:34 AM

## 2024-01-25 ENCOUNTER — ANESTHESIA (INPATIENT)
Dept: PERIOP | Facility: HOSPITAL | Age: 64
DRG: 629 | End: 2024-01-25
Payer: MEDICARE

## 2024-01-25 LAB
ANION GAP SERPL CALCULATED.3IONS-SCNC: 5 MMOL/L
BASOPHILS # BLD AUTO: 0.05 THOUSANDS/ÂΜL (ref 0–0.1)
BASOPHILS NFR BLD AUTO: 1 % (ref 0–1)
BUN SERPL-MCNC: 22 MG/DL (ref 5–25)
CALCIUM SERPL-MCNC: 8.8 MG/DL (ref 8.4–10.2)
CHLORIDE SERPL-SCNC: 102 MMOL/L (ref 96–108)
CO2 SERPL-SCNC: 30 MMOL/L (ref 21–32)
CREAT SERPL-MCNC: 0.35 MG/DL (ref 0.6–1.3)
EOSINOPHIL # BLD AUTO: 0.4 THOUSAND/ÂΜL (ref 0–0.61)
EOSINOPHIL NFR BLD AUTO: 7 % (ref 0–6)
ERYTHROCYTE [DISTWIDTH] IN BLOOD BY AUTOMATED COUNT: 20.3 % (ref 11.6–15.1)
GFR SERPL CREATININE-BSD FRML MDRD: 133 ML/MIN/1.73SQ M
GLUCOSE SERPL-MCNC: 76 MG/DL (ref 65–140)
GLUCOSE SERPL-MCNC: 81 MG/DL (ref 65–140)
GLUCOSE SERPL-MCNC: 89 MG/DL (ref 65–140)
HCT VFR BLD AUTO: 33.4 % (ref 36.5–49.3)
HGB BLD-MCNC: 9.3 G/DL (ref 12–17)
IMM GRANULOCYTES # BLD AUTO: 0.02 THOUSAND/UL (ref 0–0.2)
IMM GRANULOCYTES NFR BLD AUTO: 0 % (ref 0–2)
LYMPHOCYTES # BLD AUTO: 1.9 THOUSANDS/ÂΜL (ref 0.6–4.47)
LYMPHOCYTES NFR BLD AUTO: 35 % (ref 14–44)
MAGNESIUM SERPL-MCNC: 1.9 MG/DL (ref 1.9–2.7)
MCH RBC QN AUTO: 22.6 PG (ref 26.8–34.3)
MCHC RBC AUTO-ENTMCNC: 27.8 G/DL (ref 31.4–37.4)
MCV RBC AUTO: 81 FL (ref 82–98)
MONOCYTES # BLD AUTO: 0.42 THOUSAND/ÂΜL (ref 0.17–1.22)
MONOCYTES NFR BLD AUTO: 8 % (ref 4–12)
NEUTROPHILS # BLD AUTO: 2.71 THOUSANDS/ÂΜL (ref 1.85–7.62)
NEUTS SEG NFR BLD AUTO: 49 % (ref 43–75)
NRBC BLD AUTO-RTO: 0 /100 WBCS
PHOSPHATE SERPL-MCNC: 3.6 MG/DL (ref 2.3–4.1)
PLATELET # BLD AUTO: 359 THOUSANDS/UL (ref 149–390)
PMV BLD AUTO: 10.3 FL (ref 8.9–12.7)
POTASSIUM SERPL-SCNC: 4.3 MMOL/L (ref 3.5–5.3)
RBC # BLD AUTO: 4.12 MILLION/UL (ref 3.88–5.62)
SODIUM SERPL-SCNC: 137 MMOL/L (ref 135–147)
WBC # BLD AUTO: 5.5 THOUSAND/UL (ref 4.31–10.16)

## 2024-01-25 PROCEDURE — 0WB80ZZ EXCISION OF CHEST WALL, OPEN APPROACH: ICD-10-PCS | Performed by: THORACIC SURGERY (CARDIOTHORACIC VASCULAR SURGERY)

## 2024-01-25 PROCEDURE — 87147 CULTURE TYPE IMMUNOLOGIC: CPT | Performed by: THORACIC SURGERY (CARDIOTHORACIC VASCULAR SURGERY)

## 2024-01-25 PROCEDURE — 88304 TISSUE EXAM BY PATHOLOGIST: CPT | Performed by: PATHOLOGY

## 2024-01-25 PROCEDURE — 0PB10ZZ EXCISION OF 1 TO 2 RIBS, OPEN APPROACH: ICD-10-PCS | Performed by: THORACIC SURGERY (CARDIOTHORACIC VASCULAR SURGERY)

## 2024-01-25 PROCEDURE — 87070 CULTURE OTHR SPECIMN AEROBIC: CPT | Performed by: THORACIC SURGERY (CARDIOTHORACIC VASCULAR SURGERY)

## 2024-01-25 PROCEDURE — 84100 ASSAY OF PHOSPHORUS: CPT | Performed by: STUDENT IN AN ORGANIZED HEALTH CARE EDUCATION/TRAINING PROGRAM

## 2024-01-25 PROCEDURE — 87075 CULTR BACTERIA EXCEPT BLOOD: CPT | Performed by: THORACIC SURGERY (CARDIOTHORACIC VASCULAR SURGERY)

## 2024-01-25 PROCEDURE — 87186 SC STD MICRODIL/AGAR DIL: CPT | Performed by: THORACIC SURGERY (CARDIOTHORACIC VASCULAR SURGERY)

## 2024-01-25 PROCEDURE — 03HY32Z INSERTION OF MONITORING DEVICE INTO UPPER ARTERY, PERCUTANEOUS APPROACH: ICD-10-PCS | Performed by: INTERNAL MEDICINE

## 2024-01-25 PROCEDURE — 80048 BASIC METABOLIC PNL TOTAL CA: CPT | Performed by: STUDENT IN AN ORGANIZED HEALTH CARE EDUCATION/TRAINING PROGRAM

## 2024-01-25 PROCEDURE — 21600 PARTIAL REMOVAL OF RIB: CPT | Performed by: THORACIC SURGERY (CARDIOTHORACIC VASCULAR SURGERY)

## 2024-01-25 PROCEDURE — 0KRH07Z REPLACEMENT OF RIGHT THORAX MUSCLE WITH AUTOLOGOUS TISSUE SUBSTITUTE, OPEN APPROACH: ICD-10-PCS | Performed by: PLASTIC SURGERY

## 2024-01-25 PROCEDURE — 99232 SBSQ HOSP IP/OBS MODERATE 35: CPT | Performed by: INTERNAL MEDICINE

## 2024-01-25 PROCEDURE — 87205 SMEAR GRAM STAIN: CPT | Performed by: THORACIC SURGERY (CARDIOTHORACIC VASCULAR SURGERY)

## 2024-01-25 PROCEDURE — 83735 ASSAY OF MAGNESIUM: CPT | Performed by: STUDENT IN AN ORGANIZED HEALTH CARE EDUCATION/TRAINING PROGRAM

## 2024-01-25 PROCEDURE — 88311 DECALCIFY TISSUE: CPT | Performed by: PATHOLOGY

## 2024-01-25 PROCEDURE — 4A133B1 MONITORING OF ARTERIAL PRESSURE, PERIPHERAL, PERCUTANEOUS APPROACH: ICD-10-PCS | Performed by: INTERNAL MEDICINE

## 2024-01-25 PROCEDURE — 87077 CULTURE AEROBIC IDENTIFY: CPT | Performed by: THORACIC SURGERY (CARDIOTHORACIC VASCULAR SURGERY)

## 2024-01-25 PROCEDURE — C9290 INJ, BUPIVACAINE LIPOSOME: HCPCS | Performed by: THORACIC SURGERY (CARDIOTHORACIC VASCULAR SURGERY)

## 2024-01-25 PROCEDURE — 87102 FUNGUS ISOLATION CULTURE: CPT | Performed by: THORACIC SURGERY (CARDIOTHORACIC VASCULAR SURGERY)

## 2024-01-25 PROCEDURE — 88307 TISSUE EXAM BY PATHOLOGIST: CPT | Performed by: PATHOLOGY

## 2024-01-25 PROCEDURE — NC001 PR NO CHARGE: Performed by: THORACIC SURGERY (CARDIOTHORACIC VASCULAR SURGERY)

## 2024-01-25 PROCEDURE — 85025 COMPLETE CBC W/AUTO DIFF WBC: CPT | Performed by: STUDENT IN AN ORGANIZED HEALTH CARE EDUCATION/TRAINING PROGRAM

## 2024-01-25 PROCEDURE — 82948 REAGENT STRIP/BLOOD GLUCOSE: CPT

## 2024-01-25 PROCEDURE — 4A133J1 MONITORING OF ARTERIAL PULSE, PERIPHERAL, PERCUTANEOUS APPROACH: ICD-10-PCS | Performed by: INTERNAL MEDICINE

## 2024-01-25 RX ORDER — SODIUM CHLORIDE 9 MG/ML
INJECTION, SOLUTION INTRAVENOUS CONTINUOUS PRN
Status: DISCONTINUED | OUTPATIENT
Start: 2024-01-25 | End: 2024-01-25

## 2024-01-25 RX ORDER — SODIUM CHLORIDE, SODIUM LACTATE, POTASSIUM CHLORIDE, CALCIUM CHLORIDE 600; 310; 30; 20 MG/100ML; MG/100ML; MG/100ML; MG/100ML
INJECTION, SOLUTION INTRAVENOUS CONTINUOUS PRN
Status: DISCONTINUED | OUTPATIENT
Start: 2024-01-25 | End: 2024-01-25

## 2024-01-25 RX ORDER — METOCLOPRAMIDE HYDROCHLORIDE 5 MG/ML
10 INJECTION INTRAMUSCULAR; INTRAVENOUS ONCE AS NEEDED
Status: DISCONTINUED | OUTPATIENT
Start: 2024-01-25 | End: 2024-02-02 | Stop reason: HOSPADM

## 2024-01-25 RX ORDER — PROPOFOL 10 MG/ML
INJECTION, EMULSION INTRAVENOUS AS NEEDED
Status: DISCONTINUED | OUTPATIENT
Start: 2024-01-25 | End: 2024-01-25

## 2024-01-25 RX ORDER — LIDOCAINE HYDROCHLORIDE 10 MG/ML
0.5 INJECTION, SOLUTION EPIDURAL; INFILTRATION; INTRACAUDAL; PERINEURAL ONCE AS NEEDED
Status: DISCONTINUED | OUTPATIENT
Start: 2024-01-25 | End: 2024-01-25

## 2024-01-25 RX ORDER — PHENYLEPHRINE HCL IN 0.9% NACL 1 MG/10 ML
SYRINGE (ML) INTRAVENOUS AS NEEDED
Status: DISCONTINUED | OUTPATIENT
Start: 2024-01-25 | End: 2024-01-25

## 2024-01-25 RX ORDER — FENTANYL CITRATE 50 UG/ML
INJECTION, SOLUTION INTRAMUSCULAR; INTRAVENOUS AS NEEDED
Status: DISCONTINUED | OUTPATIENT
Start: 2024-01-25 | End: 2024-01-25

## 2024-01-25 RX ORDER — ROCURONIUM BROMIDE 10 MG/ML
INJECTION, SOLUTION INTRAVENOUS AS NEEDED
Status: DISCONTINUED | OUTPATIENT
Start: 2024-01-25 | End: 2024-01-25

## 2024-01-25 RX ORDER — ONDANSETRON 2 MG/ML
INJECTION INTRAMUSCULAR; INTRAVENOUS AS NEEDED
Status: DISCONTINUED | OUTPATIENT
Start: 2024-01-25 | End: 2024-01-25

## 2024-01-25 RX ORDER — CEFAZOLIN SODIUM 1 G/50ML
1000 SOLUTION INTRAVENOUS ONCE
Status: DISCONTINUED | OUTPATIENT
Start: 2024-01-25 | End: 2024-01-25 | Stop reason: HOSPADM

## 2024-01-25 RX ORDER — CEFAZOLIN SODIUM 2 G/50ML
2000 SOLUTION INTRAVENOUS EVERY 8 HOURS
Status: DISCONTINUED | OUTPATIENT
Start: 2024-01-25 | End: 2024-01-29

## 2024-01-25 RX ORDER — HYDROMORPHONE HCL IN WATER/PF 6 MG/30 ML
0.2 PATIENT CONTROLLED ANALGESIA SYRINGE INTRAVENOUS
Status: DISCONTINUED | OUTPATIENT
Start: 2024-01-25 | End: 2024-01-25

## 2024-01-25 RX ORDER — LIDOCAINE HYDROCHLORIDE 10 MG/ML
INJECTION, SOLUTION EPIDURAL; INFILTRATION; INTRACAUDAL; PERINEURAL AS NEEDED
Status: DISCONTINUED | OUTPATIENT
Start: 2024-01-25 | End: 2024-01-25

## 2024-01-25 RX ORDER — FENTANYL CITRATE/PF 50 MCG/ML
25 SYRINGE (ML) INJECTION
Status: DISCONTINUED | OUTPATIENT
Start: 2024-01-25 | End: 2024-01-25

## 2024-01-25 RX ADMIN — ONDANSETRON 4 MG: 2 INJECTION INTRAMUSCULAR; INTRAVENOUS at 12:12

## 2024-01-25 RX ADMIN — Medication 100 MCG: at 08:34

## 2024-01-25 RX ADMIN — HEPARIN SODIUM 5000 UNITS: 5000 INJECTION INTRAVENOUS; SUBCUTANEOUS at 22:14

## 2024-01-25 RX ADMIN — SODIUM CHLORIDE: 0.9 INJECTION, SOLUTION INTRAVENOUS at 08:27

## 2024-01-25 RX ADMIN — SUGAMMADEX 200 MG: 100 INJECTION, SOLUTION INTRAVENOUS at 12:21

## 2024-01-25 RX ADMIN — LIDOCAINE HYDROCHLORIDE 40 MG: 10 INJECTION, SOLUTION EPIDURAL; INFILTRATION; INTRACAUDAL; PERINEURAL at 08:21

## 2024-01-25 RX ADMIN — PHENYLEPHRINE HYDROCHLORIDE 20 MCG/MIN: 10 INJECTION INTRAVENOUS at 09:03

## 2024-01-25 RX ADMIN — HEPARIN SODIUM 5000 UNITS: 5000 INJECTION INTRAVENOUS; SUBCUTANEOUS at 05:17

## 2024-01-25 RX ADMIN — CEFAZOLIN SODIUM 2000 MG: 2 SOLUTION INTRAVENOUS at 22:13

## 2024-01-25 RX ADMIN — CEFAZOLIN SODIUM 1000 MG: 1 SOLUTION INTRAVENOUS at 09:03

## 2024-01-25 RX ADMIN — PROPOFOL 140 MG: 10 INJECTION, EMULSION INTRAVENOUS at 08:21

## 2024-01-25 RX ADMIN — FENTANYL CITRATE 50 MCG: 50 INJECTION INTRAMUSCULAR; INTRAVENOUS at 08:21

## 2024-01-25 RX ADMIN — HEPARIN SODIUM 5000 UNITS: 5000 INJECTION INTRAVENOUS; SUBCUTANEOUS at 16:53

## 2024-01-25 RX ADMIN — ROCURONIUM BROMIDE 40 MG: 10 INJECTION, SOLUTION INTRAVENOUS at 08:21

## 2024-01-25 RX ADMIN — SODIUM CHLORIDE, SODIUM LACTATE, POTASSIUM CHLORIDE, AND CALCIUM CHLORIDE: .6; .31; .03; .02 INJECTION, SOLUTION INTRAVENOUS at 09:10

## 2024-01-25 RX ADMIN — Medication 250 MG: at 17:00

## 2024-01-25 RX ADMIN — OXYCODONE HYDROCHLORIDE 10 MG: 10 TABLET ORAL at 22:37

## 2024-01-25 RX ADMIN — FENTANYL CITRATE 25 MCG: 50 INJECTION INTRAMUSCULAR; INTRAVENOUS at 12:16

## 2024-01-25 RX ADMIN — SODIUM CHLORIDE, SODIUM LACTATE, POTASSIUM CHLORIDE, AND CALCIUM CHLORIDE: .6; .31; .03; .02 INJECTION, SOLUTION INTRAVENOUS at 10:11

## 2024-01-25 RX ADMIN — CEFAZOLIN SODIUM 1000 MG: 1 SOLUTION INTRAVENOUS at 05:17

## 2024-01-25 RX ADMIN — FENTANYL CITRATE 25 MCG: 50 INJECTION INTRAMUSCULAR; INTRAVENOUS at 12:58

## 2024-01-25 NOTE — OP NOTE
OPERATIVE REPORT  PATIENT NAME: Rikki Arguello    :  1960  MRN: [id number]  Pt Location: BE OR ROOM 06    SURGERY DATE: 2024    Surgeons and Role:  Panel 1:     * Srikanth Maradiaga MD - Primary  Panel 2:     * Alessandra Bell MD - Primary     * Rhona Jiémnez PA-C - Assisting    Preop Diagnosis:  Large, chronic right chest wound  Right eighth rib osteomyelitis  Paraplegia    Post-Op Diagnosis Codes:  Large, chronic right chest wound  Right eighth rib osteomyelitis  Paraplegia    Procedure(s):  EXCISION OF RIGHT CHEST WALL WOUND  RESECTION OF SEGMENT OF EIGHTH RIB  LATISSIMUS MUSCLE FLAP AND WOUND CLOSURE    Specimen(s):  ID Type Source Tests Collected by Time Destination   1 : right chest bursa Tissue Chest Wall TISSUE EXAM Srikanth Maradiaga MD 2024 1000    2 : 8th rib bone Tissue Chest Wall TISSUE EXAM Srikanth Maradiaga MD 2024 1015    3 : additional bone Tissue Bone TISSUE EXAM Srikanth Maradiaga MD 2024 1016    A : 8th rib culture- swab Tissue Chest Wall ANAEROBIC CULTURE AND GRAM STAIN, FUNGAL CULTURE, CULTURE, TISSUE AND GRAM STAIN Srikanth Maradiaga MD 2024 1020    B : 8th rib culture Tissue Chest Wall ANAEROBIC CULTURE AND GRAM STAIN, FUNGAL CULTURE, CULTURE, TISSUE AND GRAM STAIN Srikanth Maradiaga MD 2024 1019        Estimated Blood Loss:   Minimal    Drains:  Closed/Suction Drain Lateral RUQ Bulb 15 Fr. (Active)   Number of days: 0       Closed/Suction Drain Lateral RUQ Bulb 15 Fr. (Active)   Number of days: 0       Colostomy LLQ (Active)   Stomal Appliance 2 piece 24   Stoma Assessment Pale 24 0804   Stoma Shape Recessed 24   Peristomal Assessment DISHA 24 222   Treatment Bag change 24 2225   Output (mL) 0 mL 24 0542   Number of days: 8       Suprapubic Catheter (Active)   Site Assessment Clean;Intact 24   Dressing Status Open to air 24   Collection  Container Standard drainage bag 01/24/24 1958   Irrigant Normal saline 01/22/24 1901   Suprapubic Irrigation Intake (mL) 50 mL 01/19/24 1145   Output (mL) 850 mL 01/25/24 0524   Number of days: 8       Anesthesia Type:   General    Operative Indications:  Large, chronic right chest wound  Right eighth rib osteomyelitis  Paraplegia  Mr. Arguello has a large chest wall ulcerated mass involving the skin, subcutaneous tissues, and chest wall.  This wound was created by the way he sits in his wheelchair and has been managed conservatively.  He now has evidence of osteomyelitis in his eighth rib and is being brought to the operating room for resection of the infected bone, muscle flap, and wound closure.    Operative Findings:  Approximate 4 cm piece of the eighth rib at the base of the wound was involved in a pathologic fracture.  This segment was completely excised back to healthy bone.  Bone cultures were taken.    Complications:   None    Procedure and Technique:  When I scrubbed in, Dr. Bell had excised to the wound bursa down to the level of the chest wall.  I had good exposure of the underlying ribs and the fracture of the eighth rib was visible and palpable.  Both the seventh rib and ninth rib although exposed felt solid with no evidence of osteomyelitis.  Using a Jones elevator, the periosteum of the eighth rib was exposed both proximally and distally for approximately 1 cm in either direction of the rib fracture.  The neurovascular bundle was dissected off of the underside of the eighth rib and the pleural space was not entered.  Using a  the rib was cut over healthy bone approximately 1 cm proximal and distal to the affected bone.  The excised bone was then cultured for aerobic, anaerobic, and fungal organisms.  The specimen was sent to pathology for routine evaluation.  Hemostasis was assured.  Examination of the costal arch reveals that near the anterior portion of the ninth rib where it meets the  costal arch, it is somewhat mobile and possibly subluxed but this is approximately 10 cm away from the area of infection and does not appear to be the result of infection.  The operation was then turned back over to Dr. Bell at her plastic surgery team for mobilization of the latissimus dorsi flap and, hopefully, wound closure.  I was present for all of this dictated portion of the procedure.  Co-surgeons were required for this operation due to different qualifications and expertise for the rib resection and flap reconstruction.   I was present for the entire procedure.    Patient Disposition:  PACU         SIGNATURE: Srikanth Maradiaga MD  DATE: January 25, 2024  TIME: 10:59 AM

## 2024-01-25 NOTE — ANESTHESIA PROCEDURE NOTES
Arterial Line Insertion    Performed by: Mary Kay Thompson MD  Authorized by: Mary Kay Thompson MD  Consent: Verbal consent obtained. Written consent obtained.  Consent given by: patient  Patient identity confirmed: arm band  Preparation: Patient was prepped and draped in the usual sterile fashion.  Indications: hemodynamic monitoring  Orientation:  Left  Location: radial artery  Procedure Details:  Needle gauge: 20  Seldinger technique: Seldinger technique used  Number of attempts: 1    Post-procedure:  Post-procedure: dressing applied  Patient tolerance: patient tolerated the procedure well with no immediate complications  Comments: Single skin and vessel puncture via ultrasound.  Easy thread of wires.  No apparent hematoma or complications.

## 2024-01-25 NOTE — PROGRESS NOTES
"Thoracic Surgery   Progress Note   Rikki Farooqcat 64 y.o. male MRN: [id number]  Unit/Bed#: [facility] 823-01 Encounter: [id number]    Assessment:  64 y.o. male who presents with a right chest wall wound. Concerns for osteomyelitis.  Now with plans for operative intervention on .    Afebrile, VSS  UOP 2.5L  A.m. labs pending   wound culture showing beta-hemolytic strep group G   repeat blood culture NGTD x48hr  Plan:  -N.p.o. for procedure  -Per ID: After flap closure, patient will need long-term IV antibiotic, guided by bone culture result   - Reviewed preoperative labs  - Maintain active T&S  -OR today  for chest wall debridement with rib resection followed by muscle flap coverage   -Daily packing changes to R chest wall  -SQH  -Pain/ nausea control PRN  -OOB/ ambulation  -Incentive Spirometry      Subjective/Objective     Subjective:   Patient seen and examined at bedside, in no acute distress. No acute events overnight.  Patient reports some pain near his wound.  Patient denies nausea vomiting fever chills chest pain or shortness of breath.  Patient is tolerating his diet.     Objective:   Vitals:Blood pressure 115/67, pulse 80, temperature 98.7 °F (37.1 °C), resp. rate 16, height 5' 7\" (1.702 m), weight 65.8 kg (145 lb), SpO2 95%.  Temp (24hrs), Av.5 °F (36.9 °C), Min:98.3 °F (36.8 °C), Max:98.7 °F (37.1 °C)      I/O          07 0700  0701   0700    P.O. 360 358    Total Intake(mL/kg) 360 (5.5) 358 (5.4)    Urine (mL/kg/hr) 2050 (1.3) 350 (0.2)    Stool 0     Total Output  350    Net -1690 +8          Unmeasured Stool Occurrence 1 x             Invasive Devices       Peripheral Intravenous Line  Duration             Peripheral IV 24 Distal;Left;Upper;Ventral (anterior) Arm <1 day              Drain  Duration             Colostomy LLQ 7 days    Suprapubic Catheter 7 days                    Physical Exam:  General: No acute distress  Neuro: Awake, Alert and Oriented " x 3  HEENT:  Normocephalic, atraumatic, moist mucous membranes  CV: Regular rate and rhythm  Lungs: Normal work of breathing, no increased respiratory effort  Chest: R chest wall wound w/ packing replaced, nontender and no fluctuance  Abdomen: Soft, non-tender, non-distended  Extremities: No edema, clubbing or cyanosis  Skin: Warm, dry        Lab Results   Component Value Date    WBC 4.13 (L) 01/23/2024    HGB 11.1 (L) 01/23/2024    HCT 40.5 01/23/2024    MCV 84 01/23/2024     01/23/2024      Lab Results   Component Value Date     02/17/2014    SODIUM 138 01/23/2024    K 4.1 01/23/2024     01/23/2024    CO2 27 01/23/2024    ANIONGAP 8 02/17/2014    AGAP 6 01/23/2024    BUN 15 01/23/2024    CREATININE 0.41 (L) 01/23/2024    GLUC 88 01/23/2024    GLUF 62 (L) 08/29/2023    CALCIUM 9.1 01/23/2024    AST 11 (L) 01/18/2024    ALT 7 01/18/2024    ALKPHOS 77 01/18/2024    PROT 8.0 02/17/2014    TP 8.6 (H) 01/18/2024    BILITOT 0.3 02/17/2014    TBILI 0.39 01/18/2024    EGFR 125 01/23/2024       VTE Prophylaxis: Heparin        Sreekanth Camarena DO  1/25/2024  6:09 AM

## 2024-01-25 NOTE — ANESTHESIA PROCEDURE NOTES
Arterial Line Insertion    Performed by: Mary Kay Thompson MD  Authorized by: Mary Kay Thompson MD

## 2024-01-25 NOTE — PLAN OF CARE
Problem: Potential for Falls  Goal: Patient will remain free of falls  Description: INTERVENTIONS:  - Educate patient/family on patient safety including physical limitations  - Instruct patient to call for assistance with activity   - Consult OT/PT to assist with strengthening/mobility   - Keep Call bell within reach  - Keep bed low and locked with side rails adjusted as appropriate  - Keep care items and personal belongings within reach  - Initiate and maintain comfort rounds  - Make Fall Risk Sign visible to staff  - Apply yellow socks and bracelet for high fall risk patients  - Consider moving patient to room near nurses station  Outcome: Progressing     Problem: PAIN - ADULT  Goal: Verbalizes/displays adequate comfort level or baseline comfort level  Description: Interventions:  - Encourage patient to monitor pain and request assistance  - Assess pain using appropriate pain scale  - Administer analgesics based on type and severity of pain and evaluate response  - Implement non-pharmacological measures as appropriate and evaluate response  - Consider cultural and social influences on pain and pain management  - Notify physician/advanced practitioner if interventions unsuccessful or patient reports new pain  Outcome: Progressing     Problem: INFECTION - ADULT  Goal: Absence or prevention of progression during hospitalization  Description: INTERVENTIONS:  - Assess and monitor for signs and symptoms of infection  - Monitor lab/diagnostic results  - Monitor all insertion sites, i.e. indwelling lines, tubes, and drains  - Monitor endotracheal if appropriate and nasal secretions for changes in amount and color  - [place] appropriate cooling/warming therapies per order  - Administer medications as ordered  - Instruct and encourage patient and family to use good hand hygiene technique  - Identify and instruct in appropriate isolation precautions for identified infection/condition  Outcome: Progressing  Goal: Absence  of fever/infection during neutropenic period  Description: INTERVENTIONS:  - Monitor WBC    Outcome: Progressing     Problem: SAFETY ADULT  Goal: Patient will remain free of falls  Description: INTERVENTIONS:  - Educate patient/family on patient safety including physical limitations  - Instruct patient to call for assistance with activity   - Consult OT/PT to assist with strengthening/mobility   - Keep Call bell within reach  - Keep bed low and locked with side rails adjusted as appropriate  - Keep care items and personal belongings within reach  - Initiate and maintain comfort rounds  - Make Fall Risk Sign visible to staff  - Apply yellow socks and bracelet for high fall risk patients  - Consider moving patient to room near nurses station  Outcome: Progressing     Problem: Prexisting or High Potential for Compromised Skin Integrity  Goal: Skin integrity is maintained or improved  Description: INTERVENTIONS:  - Identify patients at risk for skin breakdown  - Assess and monitor skin integrity  - Assess and monitor nutrition and hydration status  - Monitor labs   - Assess for incontinence   - Turn and reposition patient  - Assist with mobility/ambulation  - Relieve pressure over bony prominences  - Avoid friction and shearing  - Provide appropriate hygiene as needed including keeping skin clean and dry  - Evaluate need for skin moisturizer/barrier cream  - Collaborate with interdisciplinary team   - Patient/family teaching  - Consider wound care consult   Outcome: Progressing     Problem: Nutrition/Hydration-ADULT  Goal: Nutrient/Hydration intake appropriate for improving, restoring or maintaining nutritional needs  Description: Monitor and assess patient's nutrition/hydration status for malnutrition. Collaborate with interdisciplinary team and initiate plan and interventions as ordered.  Monitor patient's weight and dietary intake as ordered or per policy. Utilize nutrition screening tool and intervene as necessary.  Determine patient's food preferences and provide high-protein, high-caloric foods as appropriate.     INTERVENTIONS:  - Monitor oral intake, urinary output, labs, and treatment plans  - Assess nutrition and hydration status and recommend course of action  - Evaluate amount of meals eaten  - Assist patient with eating if necessary   - Allow adequate time for meals  - Recommend/ encourage appropriate diets, oral nutritional supplements, and vitamin/mineral supplements  - Order, calculate, and assess calorie counts as needed  - Recommend, monitor, and adjust tube feedings and TPN/PPN based on assessed needs  - Assess need for intravenous fluids  - Provide specific nutrition/hydration education as appropriate  - Include patient/family/caregiver in decisions related to nutrition  Outcome: Progressing

## 2024-01-25 NOTE — ANESTHESIA POSTPROCEDURE EVALUATION
Post-Op Assessment Note    CV Status:  Stable    Pain management: adequate       Mental Status:  Sleepy   Hydration Status:  Stable   PONV Controlled:  None   Airway Patency:  Patent     Post Op Vitals Reviewed: Yes    No anethesia notable event occurred.    Staff: Anesthesiologist               BP   90/43   Temp (!) 97.4 °F (36.3 °C) (01/25/24 1242)    Pulse  81   Resp   21   SpO2   100

## 2024-01-25 NOTE — ANESTHESIA PREPROCEDURE EVALUATION
Procedure:  RESECTION RIB R CHEST WALL RESECTION/RIB RESECTION/RECONSTRUCTION (Right: Chest)  latissimus flap (Chest)    Relevant Problems   CARDIO   (+) Benign essential hypertension      ENDO   (+) Diabetes mellitus type II, controlled (HCC)   (+) Type 2 diabetes mellitus without complication, without long-term current use of insulin (HCC)      GI/HEPATIC   (+) GERD (gastroesophageal reflux disease)   (+) SBO (small bowel obstruction) (HCC)      /RENAL   (+) Renal cyst      HEMATOLOGY   (+) Anemia      NEURO/PSYCH   (+) Anxiety   (+) Continuous opioid dependence (HCC)   (+) Paraplegic spinal paralysis (HCC) (T4 Level)      Other   (+) Osteomyelitis of pelvic region (HCC)   (+) Subacute osteomyelitis, other site (HCC)        Physical Exam    Airway    Mallampati score: IV  TM Distance: >3 FB  Neck ROM: full     Dental   No notable dental hx     Cardiovascular  Cardiovascular exam normal    Pulmonary  Pulmonary exam normal     Other Findings        Anesthesia Plan  ASA Score- 3     Anesthesia Type- general with ASA Monitors.         Additional Monitors: arterial line.    Airway Plan: ETT.           Plan Factors-    Chart reviewed. EKG reviewed. Imaging results reviewed. Existing labs reviewed. Patient summary reviewed.                  Induction- intravenous.    Postoperative Plan- Plan for postoperative opioid use. Planned trial extubation    Informed Consent- Anesthetic plan and risks discussed with patient.  I personally reviewed this patient with the CRNA. Discussed and agreed on the Anesthesia Plan with the CRNA..

## 2024-01-25 NOTE — PROGRESS NOTES
Progress Note - Infectious Disease   Rikki Farooqcat 64 y.o. male MRN: [id number]  Unit/Bed#: OR POOL Encounter: [id number]      Impression/Plan:    63 y/o M with long-term spinal related paraplegia complicated by multiple pressure related ulcers with MRSA colonization and suprapubic catheter/colostomy need with recurrent ESBL/MDRO UTI here with right chest wall wound complicated by OM.  Wound noted in early December, and confirmed on CT on 12/22 showing fracture of right lateral 6th-8th ribs and new pleural effusion but without drainable abscess.  Patient was however stable at this time and therefore Gen Surg did not believe that he required urgent admission, which is why he presents weeks since the CT.     Patient has an open wound with exposed bone on his right mid lateral/near posterior chest wall.  Wound cultures of grown and isolated 4+ GBS.  For this reason we have made it clear that antibiotics are not recommended until this wound is closed and bone covered.  However patient did have an episode of fever and leukopenia on early 1/19 a.m. and was therefore started on Ceftriaxone per [facility]. ID has since recommended de-escalating to cefazolin.     Today he is undergoing R chest wall wound repair and underlying rib removal per Plastics & [facility]. Will f/u with pt post-op, along with bone clx to determine possible need of abx change. Regardless will need 6 week course of abx tx in total, unless surgical cure is determined by surgery.     # Open Pressure Ulcer w/ Exposed Bone on Lateral R Mid-chest Wall w/o Evidence of Cellulitis  Lat R 6th-8th Rib Fx c/b OM underlying Open Wound  Wclx with 4+ GBS  Wound care on-board, remains covered and with pressure dressings  Will require surgical tx of wound/OM f/b 4-6 week abx course  If Bone Bx (-) for Bacteria --> C/w Cefazolin  If Bone Bx (+) for any Bacteria other than GBS --> Consider change in Abx regimen  Patient is currently on day 6 of IV cefazolin however at 1  g, which was to treat for possible cellulitis.  As we will no longer be treating for cellulitis in the future, but rather OM, we will need to increase this dose  For OR per [facility]  Will perform rib excision, wound closure, and chest wall debridement  To f/u bone bx, determine possibility of surgical cure of OM  NB, we do not expect there to be surgical care as these rib excisions other very difficult and not easy to get wide margins  Duration of antibiotic course does not include the cefazolin doses he received prior to his surgery     # H/o Paraplegia   C/b Frequent Pressure-related Wounds, h/o MRSA Colonization  C/b Detrussor Dysfxn now s/p Suprapubic Cather, Reccurrent UTI with h/o ESBL/MDRO  Patient has regular follow-up with medical and surgical wound care outpatient.  Additionally has 24/7 wound care home health  Has had lengthy discussion with Plastics regarding how to avoid future chest wall wound and ensure that post-op wound healing is successful     # Wheelchair-bound  This is related to patient's paraplegia and post AKA status  Seems that patient spends a good amount of time in his wheelchair, i.e. enough to cause this right lateral mid chest wall pressure wound  Patient D/W Plastics re: strategies to ensure wound healing and avoid future wounds     # H/o T2DM, well-controlled, non-insulin dependent  A1c well below 7% per last check  Will likely impact wound healing overall, however should not impair wound to completely close  Non-insulin-dependent which avoid additional alleys of infection  Mgmt per primary team     # HTN  HLD  NormoTNive so far throughout hospitalization  Is on antiHTN and statin at home, would continue while IP  Mgmt per primary team        Plan:     Increase IV cefazolin to 2 g every 8 hours (ie, per OM-dosing)  To c/w IV antibiotics x 4 to 6 weeks after surgery, with antibiotic day 0 being the first day he receives Ancef 2 g and postop  N.b. patient has 24/7 [facility]  and therefore will likely be a good candidate for home IV antibiotics  F/U surgical clx's --> consider broadening abx if clx (+), however if clx (-) may c/w Cefazolin post-op  Pain mgmt, DVT ppx per primary team  Monitor fever/WBC curves     WILL DISCUSS FINAL PLANS ON ROUNDS AND UPDATE TEAMS ACCORDINGLY. Please wait for final recommendations from Dr. Salomon.  ID consult service will continue to follow.     I have spent 75/55 or 50/35 minutes with Patient/family, other providers and in review of records today in completing this visit. Total time spent included review of testing, ordering medications and tests, communicating with other providers, documentation time and counseling/providing education to patient, family, caregiver and coordination of outpatient care as detailed in my note.     Antibiotics:    Current:  IV Cefazolin 1 gm, D6  Previous:  IV CTX x 1 dose    24 Hour events:    NAEO, Mr Arguello went down for surgery earlier this morning and therefore was not able to physically see patient today. However, per EMR he remains afebrile x 72hrs and WBC count is now trending towards WNL.    Subjective    Patient has no fever, chills, sweats; no nausea, vomiting, diarrhea; no cough, shortness of breath; no pain. No new symptoms.      Objective:  Vitals:  Temp:  [97.4 °F (36.3 °C)-98.7 °F (37.1 °C)] 97.4 °F (36.3 °C)  HR:  [76-84] 76  Resp:  [16-19] 16  BP: ()/(65-67) 86/65  SpO2:  [95 %-100 %] 100 %  Temp (24hrs), Av.2 °F (36.8 °C), Min:97.4 °F (36.3 °C), Max:98.7 °F (37.1 °C)  Current: Temperature: (!) 97.4 °F (36.3 °C)    Physical Exam:   Was unable to see patient physically today as he was already at OR during my rounds    Labs, Imaging, & Other studies:   All pertinent labs and imaging studies were personally reviewed as below.  Results from last 7 days   Lab Units 24  0518 24  0647 24  1610   WBC Thousand/uL 5.50 4.13* 3.81*   HEMOGLOBIN g/dL 9.3* 11.1* 9.3*   PLATELETS Thousands/uL 359  283 345     Results from last 7 days   Lab Units 01/25/24  0518   POTASSIUM mmol/L 4.3   CHLORIDE mmol/L 102   CO2 mmol/L 30   BUN mg/dL 22   CREATININE mg/dL 0.35*   EGFR ml/min/1.73sq m 133   CALCIUM mg/dL 8.8     Results from last 7 days   Lab Units 01/18/24  2305   BLOOD CULTURE  No Growth After 5 Days.  No Growth After 5 Days.       Miguel Candelaria MD  Internal Medicine Residency PGY-2  [facility]

## 2024-01-25 NOTE — QUICK NOTE
"Post Op Check:    Patient seen and examined at bedside, in no acute distress.  Patient reports that his pain is well-controlled.  Patient denies nausea vomiting fevers chills chest pain shortness of breath.  Patient was eating and resting comfortably in his bed.    Vitals:Blood pressure 121/76, pulse 66, temperature 97.8 °F (36.6 °C), temperature source Oral, resp. rate 16, height 5' 7\" (1.702 m), weight 65.8 kg (145 lb), SpO2 97%.  Temp (24hrs), Av °F (36.7 °C), Min:97.4 °F (36.3 °C), Max:98.7 °F (37.1 °C)      General: NAD  HENT: NCAT MMM  Neck: supple, no JVD  CV: nl rate  Lungs: nl wob. No resp distress  Chest: Soft, nontender, no crepitus or fluctuance, BALTAZAR x3 serosanguineous output  ABD: Soft, nontender, nondistended  Extrem: No CCE  Neuro: AAOx3       I/O          07 07 0701   07 07 0700    P.O. 480 540 0    I.V. (mL/kg)   2100 (31.9)    Total Intake(mL/kg) 480 (7.3) 540 (8.2) 2100 (31.9)    Urine (mL/kg/hr) 2325 (1.5) 2525 (1.6) 400 (0.5)    Drains   80    Stool 0      Total Output 2325 2525 480    Net - - +                   Invasive Devices       Peripheral Intravenous Line  Duration             Peripheral IV 24 Distal;Left;Upper;Ventral (anterior) Arm <1 day    Peripheral IV 24 Left Forearm <1 day              Drain  Duration             Colostomy LLQ 7 days    Suprapubic Catheter 7 days    Closed/Suction Drain Lateral RUQ Bulb 15 Fr. <1 day    Closed/Suction Drain Lateral RUQ Bulb 15 Fr. <1 day    Closed/Suction Drain Lateral RUQ Bulb 15 Fr. <1 day                      Plan:  Diet Regular; Regular House  Continue to monitor drain output and character  Pain and nausea control PRN    Akhil Mills MD  2024 6:47 PM    "

## 2024-01-26 LAB
ABO GROUP BLD BPU: NORMAL
ABO GROUP BLD BPU: NORMAL
ANION GAP SERPL CALCULATED.3IONS-SCNC: 8 MMOL/L
BASOPHILS # BLD AUTO: 0.04 THOUSANDS/ÂΜL (ref 0–0.1)
BASOPHILS NFR BLD AUTO: 1 % (ref 0–1)
BPU ID: NORMAL
BPU ID: NORMAL
BUN SERPL-MCNC: 20 MG/DL (ref 5–25)
CALCIUM SERPL-MCNC: 7.7 MG/DL (ref 8.4–10.2)
CHLORIDE SERPL-SCNC: 107 MMOL/L (ref 96–108)
CO2 SERPL-SCNC: 21 MMOL/L (ref 21–32)
CREAT SERPL-MCNC: 0.36 MG/DL (ref 0.6–1.3)
CROSSMATCH: NORMAL
CROSSMATCH: NORMAL
EOSINOPHIL # BLD AUTO: 0.42 THOUSAND/ÂΜL (ref 0–0.61)
EOSINOPHIL NFR BLD AUTO: 5 % (ref 0–6)
ERYTHROCYTE [DISTWIDTH] IN BLOOD BY AUTOMATED COUNT: 20.2 % (ref 11.6–15.1)
GFR SERPL CREATININE-BSD FRML MDRD: 132 ML/MIN/1.73SQ M
GLUCOSE SERPL-MCNC: 118 MG/DL (ref 65–140)
GLUCOSE SERPL-MCNC: 122 MG/DL (ref 65–140)
GLUCOSE SERPL-MCNC: 152 MG/DL (ref 65–140)
GLUCOSE SERPL-MCNC: 76 MG/DL (ref 65–140)
GLUCOSE SERPL-MCNC: 94 MG/DL (ref 65–140)
HCT VFR BLD AUTO: 29.2 % (ref 36.5–49.3)
HGB BLD-MCNC: 8.4 G/DL (ref 12–17)
IMM GRANULOCYTES # BLD AUTO: 0.03 THOUSAND/UL (ref 0–0.2)
IMM GRANULOCYTES NFR BLD AUTO: 0 % (ref 0–2)
LYMPHOCYTES # BLD AUTO: 1.7 THOUSANDS/ÂΜL (ref 0.6–4.47)
LYMPHOCYTES NFR BLD AUTO: 19 % (ref 14–44)
MAGNESIUM SERPL-MCNC: 2.1 MG/DL (ref 1.9–2.7)
MCH RBC QN AUTO: 23.1 PG (ref 26.8–34.3)
MCHC RBC AUTO-ENTMCNC: 28.8 G/DL (ref 31.4–37.4)
MCV RBC AUTO: 80 FL (ref 82–98)
MONOCYTES # BLD AUTO: 0.67 THOUSAND/ÂΜL (ref 0.17–1.22)
MONOCYTES NFR BLD AUTO: 8 % (ref 4–12)
NEUTROPHILS # BLD AUTO: 5.97 THOUSANDS/ÂΜL (ref 1.85–7.62)
NEUTS SEG NFR BLD AUTO: 67 % (ref 43–75)
NRBC BLD AUTO-RTO: 0 /100 WBCS
PHOSPHATE SERPL-MCNC: 3.3 MG/DL (ref 2.3–4.1)
PLATELET # BLD AUTO: 335 THOUSANDS/UL (ref 149–390)
PMV BLD AUTO: 10.4 FL (ref 8.9–12.7)
POTASSIUM SERPL-SCNC: 4.4 MMOL/L (ref 3.5–5.3)
RBC # BLD AUTO: 3.64 MILLION/UL (ref 3.88–5.62)
SODIUM SERPL-SCNC: 136 MMOL/L (ref 135–147)
UNIT DISPENSE STATUS: NORMAL
UNIT DISPENSE STATUS: NORMAL
UNIT PRODUCT CODE: NORMAL
UNIT PRODUCT CODE: NORMAL
UNIT PRODUCT VOLUME: 350 ML
UNIT PRODUCT VOLUME: 350 ML
UNIT RH: NORMAL
UNIT RH: NORMAL
WBC # BLD AUTO: 8.83 THOUSAND/UL (ref 4.31–10.16)

## 2024-01-26 PROCEDURE — 85025 COMPLETE CBC W/AUTO DIFF WBC: CPT | Performed by: STUDENT IN AN ORGANIZED HEALTH CARE EDUCATION/TRAINING PROGRAM

## 2024-01-26 PROCEDURE — 99232 SBSQ HOSP IP/OBS MODERATE 35: CPT | Performed by: INTERNAL MEDICINE

## 2024-01-26 PROCEDURE — 82948 REAGENT STRIP/BLOOD GLUCOSE: CPT

## 2024-01-26 PROCEDURE — 99024 POSTOP FOLLOW-UP VISIT: CPT | Performed by: THORACIC SURGERY (CARDIOTHORACIC VASCULAR SURGERY)

## 2024-01-26 PROCEDURE — 83735 ASSAY OF MAGNESIUM: CPT | Performed by: STUDENT IN AN ORGANIZED HEALTH CARE EDUCATION/TRAINING PROGRAM

## 2024-01-26 PROCEDURE — 84100 ASSAY OF PHOSPHORUS: CPT | Performed by: STUDENT IN AN ORGANIZED HEALTH CARE EDUCATION/TRAINING PROGRAM

## 2024-01-26 PROCEDURE — 80048 BASIC METABOLIC PNL TOTAL CA: CPT | Performed by: STUDENT IN AN ORGANIZED HEALTH CARE EDUCATION/TRAINING PROGRAM

## 2024-01-26 PROCEDURE — 99024 POSTOP FOLLOW-UP VISIT: CPT | Performed by: PLASTIC SURGERY

## 2024-01-26 RX ADMIN — CEFAZOLIN SODIUM 2000 MG: 2 SOLUTION INTRAVENOUS at 22:23

## 2024-01-26 RX ADMIN — CEFAZOLIN SODIUM 2000 MG: 2 SOLUTION INTRAVENOUS at 13:55

## 2024-01-26 RX ADMIN — HYDROMORPHONE HYDROCHLORIDE 0.5 MG: 1 INJECTION, SOLUTION INTRAMUSCULAR; INTRAVENOUS; SUBCUTANEOUS at 00:07

## 2024-01-26 RX ADMIN — OXYCODONE HYDROCHLORIDE 10 MG: 10 TABLET ORAL at 05:55

## 2024-01-26 RX ADMIN — Medication 250 MG: at 08:26

## 2024-01-26 RX ADMIN — PANTOPRAZOLE SODIUM 40 MG: 40 TABLET, DELAYED RELEASE ORAL at 05:41

## 2024-01-26 RX ADMIN — HEPARIN SODIUM 5000 UNITS: 5000 INJECTION INTRAVENOUS; SUBCUTANEOUS at 13:55

## 2024-01-26 RX ADMIN — OXYCODONE HYDROCHLORIDE 10 MG: 10 TABLET ORAL at 13:58

## 2024-01-26 RX ADMIN — CEFAZOLIN SODIUM 2000 MG: 2 SOLUTION INTRAVENOUS at 05:41

## 2024-01-26 RX ADMIN — HEPARIN SODIUM 5000 UNITS: 5000 INJECTION INTRAVENOUS; SUBCUTANEOUS at 22:24

## 2024-01-26 RX ADMIN — Medication 250 MG: at 18:13

## 2024-01-26 RX ADMIN — HYDROMORPHONE HYDROCHLORIDE 0.5 MG: 1 INJECTION, SOLUTION INTRAMUSCULAR; INTRAVENOUS; SUBCUTANEOUS at 08:26

## 2024-01-26 RX ADMIN — OXYCODONE HYDROCHLORIDE 10 MG: 10 TABLET ORAL at 18:13

## 2024-01-26 RX ADMIN — HEPARIN SODIUM 5000 UNITS: 5000 INJECTION INTRAVENOUS; SUBCUTANEOUS at 05:41

## 2024-01-26 RX ADMIN — ASPIRIN 81 MG: 81 TABLET, COATED ORAL at 08:26

## 2024-01-26 RX ADMIN — HYDROMORPHONE HYDROCHLORIDE 0.5 MG: 1 INJECTION, SOLUTION INTRAMUSCULAR; INTRAVENOUS; SUBCUTANEOUS at 19:22

## 2024-01-26 NOTE — CASE MANAGEMENT
Case Management Discharge Planning Note    Patient name Rikki Arguello  [facility]N [id number]  : 1960 Date 2024       Current Admission Date: 2024  Current Admission Diagnosis:Stage IV pressure ulcer of right lower back (HCC)   Patient Active Problem List    Diagnosis Date Noted    Subacute osteomyelitis, other site ([facility]) 2023    Foul smelling urine 2023    Pressure ulcer of left buttock, stage 4 ([facility]) 01/10/2023    Heme positive stool 2022    Open wound of buttock, left, initial encounter 2022    Pressure injury of contiguous region involving back and left buttock, stage 4 ([facility]) 2021    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 ([facility]) 2021    Stage IV pressure ulcer of right lower back ([facility]) 2021    Type 2 diabetes mellitus without complication, without long-term current use of insulin ([facility]) 2021    Renal cyst 2019    Other male erectile dysfunction 2019    Prostate cancer screening 2019    Hypokalemia 10/29/2019    SBO (small bowel obstruction) ([facility]) 10/28/2019    Sepsis ([facility]) 10/28/2019    Stage II pressure ulcer of buttock ([facility]) 2019    Left perineal ischial pressure ulcer, stage IV ([facility]) 2019    Hyperkalemia 2019    History of Clostridium difficile colitis 2019    Neurogenic bladder 2019    Sepsis with hypotension  2019    Osteomyelitis of pelvic region ([facility]) 2019    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    Colostomy in place ([facility]) 2019    Colon cancer screening 2019    Dyspepsia 2019    Epigastric pain 2019    Continuous opioid dependence ([facility])     Decubitus ulcer of ischium, left, stage IV ([facility]) 2018    Diarrhea 2018    Amputated right leg ([facility]) 2018    Anxiety 2018    GERD (gastroesophageal reflux disease)     History of osteomyelitis     Cyst of joint of shoulder 10/20/2016    Anemia 10/20/2016    Paraplegic spinal  paralysis (HCC) 10/20/2016    Sinus tachycardia 10/20/2016    Stage IV pressure ulcer of sacral region (HCC) 10/15/2016    Stage IV pressure ulcer of left heel (HCC) 10/15/2016    Diabetes mellitus type II, controlled (HCC) 03/07/2014    Benign essential hypertension 07/31/2013      LOS (days): 9  Geometric Mean LOS (GMLOS) (days): 3.4  Days to GMLOS:-5.3     OBJECTIVE:  Risk of Unplanned Readmission Score: 17.47         Current admission status: Inpatient   Preferred Pharmacy:   [id number] - [place] - [address]  [address]  [facility] [address]  Phone: [phone] Fax: [phone]    [id number] - [place] - [address] [facility]  [address] [facility] [address]  Phone: [phone] Fax: [phone]    Primary Care Provider: Lexy Bo MD    Primary Insurance: MEDICARE  Secondary Insurance: [facility]    DISCHARGE DETAILS:                                Requested Home Health Care         HHA External Referral Reason (only applicable if external HHA name selected): Patient has established relationship with provider  Home Health Services Needed:: Wound/Ostomy Care, Post-Op Care and Assessment, Administration of IV, IM or SC Medications, Central Line Care, Urinary Incontinence Catheter Management, Diabetes Management  Homebound Criteria Met:: Requires the Assistance of Another Person for Safe Ambulation or to Leave the Home, Requires Medical Transportation  Supporting Clincal Findings:: Bed Bound or Wheelchair Bound, Limited Endurance         Other Referral/Resources/Interventions Provided:  Interventions: HHC  Referral Comments: open to [facility], anticipate IV antibiotic x6 weeks    Would you like to participate in our [facility] service program?  : No - Declined    Treatment Team Recommendation: Home with Home Health Care  Discharge Destination Plan:: Home with Home Health Care                                          Additional Comments: 63yo male paraplegic is POD#1 resection of right chest wall wound/ribs 7 and 8 with purulent drainage/chest wall. Has 3 BALTAZAR drains, will need Cefazolin 4-6 weeks, started 1/25/24.Has colostomy, suprapubic catheter, wheelchair bound. Has hx right AKA, blind right eye, chronic osteomyelitis, DM2 poorly controlled. Hx anxiety and opiod use. Lives with wife in [place] in first floor apartment. Anticipate will need PICC line, will send antibiotic RX to [facility] when available. Is already open to [facility] for nursing care and they have accepted. Discharge date tbd.
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
## 2024-01-26 NOTE — INTERIM OP NOTE
RESECTION RIB R CHEST WALL RESECTION/RIB RESECTION/RECONSTRUCTION  Postoperative Note  PATIENT NAME: Rikki Arguello  : 1960  MRN: [id number]  BE OR ROOM 06    Surgery Date: 2024    Preop Diagnosis:  Other chronic osteomyelitis, other site (HCC) [M86.68]  Subacute osteomyelitis, other site (HCC) [M86.28]    Post-Op Diagnosis Codes:     * Other chronic osteomyelitis, other site (HCC) [M86.68]     * Subacute osteomyelitis, other site (HCC) [M86.28]    Procedure(s) (LRB):  RESECTION RIB R CHEST WALL RESECTION/RIB RESECTION/RECONSTRUCTION (Right)  latissimus flap (N/A)    Surgeons and Role:  Panel 1:     * Srikanth Maradiaga MD - Primary     * Jairo Chaudhary DO - Assisting  Panel 2:     * Alessandra Bell MD - Primary     * Rhona Jiménez PA-C - Assisting    Specimens:  ID Type Source Tests Collected by Time Destination   1 : right chest bursa Tissue Bursa/Synovial Cyst TISSUE EXAM Srikanth Maradiaga MD 2024 10:00 AM    2 : 8th rib bone Tissue Bone TISSUE EXAM Srikanth Maradiaga MD 2024 10:15 AM    3 : additional bone Tissue Bone TISSUE EXAM Srikanth Maradiaga MD 2024 10:16 AM    A : 8th rib culture- swab Tissue Chest Wall ANAEROBIC CULTURE AND GRAM STAIN, FUNGAL CULTURE, CULTURE, TISSUE AND GRAM STAIN Srikanth Maradiaga MD 2024 10:20 AM    B : 8th rib culture Tissue Chest Wall ANAEROBIC CULTURE AND GRAM STAIN, FUNGAL CULTURE, CULTURE, TISSUE AND GRAM STAIN Srikanth Maradiaga MD 2024 10:19 AM        Estimated Blood Loss:   Minimal    Anesthesia Type:   General     Findings:   Large wound bursa, pathologic fracture of 8th rib with associated osteomyelitis, Latissimus flap pink and viable.    Complications:   None      SIGNATURE: Alessandra Bell MD   DATE: 2024   TIME: 9:45 PM

## 2024-01-26 NOTE — PROGRESS NOTES
Progress Note - Infectious Disease   Rikki Arguello 64 y.o. male MRN: [id number]  Unit/Bed#: [facility] 402-01 Encounter: [id number]      Impression/Recommendations:  Chronic osteomyelitis of the right 6 through 8 ribs with pathologic fracture.  Etiology of osteomyelitis is chronically nonhealing right chest wall decubitus ulcer.  Patient was started on cefazolin for possible cellulitis.  Patient is status post I&D with partial resection of 8th rib and latissimus muscle flap and wound closure.  Patient will need long-term IV antibiotic.  Continue high-dose IV cefazolin.    Follow-up on operative culture.  Antibiotic adjustment per operative cultures.  If operative cultures have no growth, given cefazolin preop, will continue IV cefazolin for duration of treatment.  Treat x 4-6 weeks total antibiotic.     Possible chest wall cellulitis, with patient started on antibiotic preop.  Antibiotic plan as in above.  Monitor temperature/WBC.     Chronic and progressing right chest wall decubitus ulcer, likely secondary to lack of padding of the wheelchair.  In order for flap closure to be successful, patient will need to offload his chest wall as much as possible.  Otherwise, with ongoing pressure at the chest wall after flap reconstruction, probability of flap failure will be very high.  Continue local wound care for now.  Aggressive offloading of right chest wall.     4. Longstanding paraplegia, wheelchair-bound, with multiple prior decubitus ulcers, including right lower leg resulting in right AKA.     5. DM, reasonably well-controlled.  This contributes to poor wound healing and infection above.  Management per primary service.     Discussed with patient in detail regarding the above plan.    Antibiotics:  Cefazolin  POD # 1    Subjective:  Patient has some right chest wall pain, controlled.  Temperature is down.  No chills.  He is tolerating antibiotic well.  No nausea, vomiting or diarrhea.    Objective:  Vitals:  Temp:   [97.4 °F (36.3 °C)-99.5 °F (37.5 °C)] 99.2 °F (37.3 °C)  HR:  [60-83] 81  Resp:  [14-19] 16  BP: ()/(57-76) 130/67  SpO2:  [93 %-100 %] 94 %  Temp (24hrs), Av.5 °F (36.9 °C), Min:97.4 °F (36.3 °C), Max:99.5 °F (37.5 °C)  Current: Temperature: 99.2 °F (37.3 °C)    Physical Exam:     General: Awake, alert, cooperative, no distress.   Neck:  Supple. No mass.  No lymphadenopathy.   Chest:  Wound with dressing in place.  Dressing is dry.  No erythema/warm beyond dressing.  Drain serous.  Mild to moderate tenderness.   Lungs: Expansion symmetric, no rales, no wheezing, respirations unlabored.   Heart:  Regular rate and rhythm, S1 and S2 normal, no murmur.   Abdomen: Soft, nondistended, non-tender, bowel sounds active all four quadrants, no masses, no organomegaly.   Extremities: No edema. No erythema/warmth. No ulcer. Nontender to palpation.   Skin:  No rash.   Neuro: Stable chronic paraplegia.     Invasive Devices       Peripheral Intravenous Line  Duration             Peripheral IV 24 Distal;Left;Upper;Ventral (anterior) Arm 1 day    Peripheral IV 24 Left Forearm 1 day              Drain  Duration             Colostomy LLQ 8 days    Suprapubic Catheter 8 days    Closed/Suction Drain Lateral RUQ Bulb 15 Fr. 1 day    Closed/Suction Drain Lateral RUQ Bulb 15 Fr. 1 day    Closed/Suction Drain Lateral RUQ Bulb 15 Fr. 1 day                    Labs studies:   I have personally reviewed pertinent labs.  Results from last 7 days   Lab Units 24  0630 24  0518 24  0647   POTASSIUM mmol/L 4.4 4.3 4.1   CHLORIDE mmol/L 107 102 105   CO2 mmol/L 21 30 27   BUN mg/dL 20 22 15   CREATININE mg/dL 0.36* 0.35* 0.41*   EGFR ml/min/1.73sq m 132 133 125   CALCIUM mg/dL 7.7* 8.8 9.1     Results from last 7 days   Lab Units 24  0832 24  0518 24  0647   WBC Thousand/uL 8.83 5.50 4.13*   HEMOGLOBIN g/dL 8.4* 9.3* 11.1*   PLATELETS Thousands/uL 335 359 283     Results from last 7 days   Lab  Units 01/25/24  1020 01/25/24  1019   GRAM STAIN RESULT  1+ Polys  No bacteria seen Rare Polys  No organisms seen       Imaging Studies:   I have personally reviewed pertinent imaging study reports and images in PACS.    EKG, Pathology, and Other Studies:   I have personally reviewed pertinent reports.

## 2024-01-26 NOTE — TREATMENT PLAN
Rikki Arguello  1960  [id number]  01/26/24       UrologyTreatment Plan        Rikki Arguello is a 64-year-old male known to our service with a history of neurogenic bladder due to spinal cord injury and paraplegia, status post bladder augmentation and appendiceal vesicostomy, penile prosthesis, with device erosion, explantation by Dr. Bong García.  11/1/2019.    Patient is admitted for nonhealing right lateral chest wall wound.    We were contacted by surgical service to evaluate suprapubic catheter.        Plan:  16 French Solomon catheter patent for grossly clear yellow urine.  Catheter replaced by [facility] just prior to admission last week.  Manually irrigated without resistance.  Maintain to straight drainage.  Manually irrigate twice daily.  Bladder scan as needed for any suspicion of catheter obstruction.  Contact our service with any questions or  concerns.    Vitals:    01/26/24 1509   BP: 122/67   Pulse: 79   Resp:    Temp: 99.3 °F (37.4 °C)   SpO2: 97%               Lab Results   Component Value Date    WBC 8.83 01/26/2024    HGB 8.4 (L) 01/26/2024    HCT 29.2 (L) 01/26/2024    MCV 80 (L) 01/26/2024     01/26/2024     Lab Results   Component Value Date    SODIUM 136 01/26/2024    K 4.4 01/26/2024     01/26/2024    CO2 21 01/26/2024    BUN 20 01/26/2024    CREATININE 0.36 (L) 01/26/2024    GLUC 76 01/26/2024    CALCIUM 7.7 (L) 01/26/2024       Image Report--N/A            KALPANA Bryant

## 2024-01-26 NOTE — PLAN OF CARE
Problem: INFECTION - ADULT  Goal: Absence or prevention of progression during hospitalization  Description: INTERVENTIONS:  - Assess and monitor for signs and symptoms of infection  - Monitor lab/diagnostic results  - Monitor all insertion sites, i.e. indwelling lines, tubes, and drains  - Monitor endotracheal if appropriate and nasal secretions for changes in amount and color  - [place] appropriate cooling/warming therapies per order  - Administer medications as ordered  - Instruct and encourage patient and family to use good hand hygiene technique  - Identify and instruct in appropriate isolation precautions for identified infection/condition  Outcome: Progressing  Goal: Absence of fever/infection during neutropenic period  Description: INTERVENTIONS:  - Monitor WBC    Outcome: Progressing

## 2024-01-26 NOTE — PROGRESS NOTES
"Thoracic Surgery  Progress Note   Rikki Farooqcat 64 y.o. male MRN: [id number]  Unit/Bed#: [facility] 402-01 Encounter: [id number]    Assessment:  64 y.o. male who presents with a right chest wall wound. S/p Excision R chest wall wound, resection 8th R rib, lat muscle flap wound closure .     AVSS on 3 L  UOP: 900 cc  BALTAZAR x 3: 275 serosanguineous, BALTAZAR 150, BALTAZAR 2 and 190, BALTAZAR 335    WBC pending from 5.50  Hgb pending from 9.3  Plts pending from 359  Cr pending from 0.35    Plan:  -Continue regular diet  -Per ID, will need long term IV antibiotics  -Consult for PICC placement  -F/u CM for dispo planning  -Pain/ nausea control PRN  -OOB/ ambulation  -Incentive Spirometry      Subjective/Objective     Subjective:   Patient seen and examined at bedside, in no acute distress. No acute events overnight.  Patient reports that he had some chest pain near the incision yesterday.  Patient reports that pain medication did not help much.  Patient is passing flatus and bowel movements and tolerating diet.  Patient denies nausea vomiting fevers chills chest pain or shortness of breath.    Objective:   Vitals:Blood pressure 132/66, pulse 83, temperature 99.5 °F (37.5 °C), temperature source Oral, resp. rate 18, height 5' 7\" (1.702 m), weight 65.8 kg (145 lb), SpO2 96%.  Temp (24hrs), Av.2 °F (36.8 °C), Min:97.4 °F (36.3 °C), Max:99.5 °F (37.5 °C)      I/O          0701   0700  0701   0700    P.O. 540 0    I.V. (mL/kg)  2100 (31.9)    Total Intake(mL/kg) 540 (8.2) 2100 (31.9)    Urine (mL/kg/hr) 2525 (1.6) 900 (0.6)    Drains  185    Total Output 2525 1085    Net -1985 +1015                  Invasive Devices       Peripheral Intravenous Line  Duration             Peripheral IV 24 Distal;Left;Upper;Ventral (anterior) Arm 1 day    Peripheral IV 24 Left Forearm <1 day              Drain  Duration             Colostomy LLQ 8 days    Suprapubic Catheter 8 days    Closed/Suction Drain Lateral RUQ Bulb 15 Fr. " <1 day    Closed/Suction Drain Lateral RUQ Bulb 15 Fr. <1 day    Closed/Suction Drain Lateral RUQ Bulb 15 Fr. <1 day                    Physical Exam:  General: No acute distress  Neuro: Awake, Alert and Oriented x 3  HEENT:  Normocephalic, atraumatic, moist mucous membranes  CV: Regular rate and rhythm  Lungs: Normal work of breathing, no increased respiratory effort  Chest: : Soft, nontender, no crepitus or fluctuance, BALTAZAR x3 serosanguineous output   Abdomen: Soft, non-tender, non-distended.  Extremities: No edema, clubbing or cyanosis  Skin: Warm, dry    Lab Results   Component Value Date    WBC 5.50 01/25/2024    HGB 9.3 (L) 01/25/2024    HCT 33.4 (L) 01/25/2024    MCV 81 (L) 01/25/2024     01/25/2024      Lab Results   Component Value Date     02/17/2014    SODIUM 137 01/25/2024    K 4.3 01/25/2024     01/25/2024    CO2 30 01/25/2024    ANIONGAP 8 02/17/2014    AGAP 5 01/25/2024    BUN 22 01/25/2024    CREATININE 0.35 (L) 01/25/2024    GLUC 89 01/25/2024    GLUF 62 (L) 08/29/2023    CALCIUM 8.8 01/25/2024    AST 11 (L) 01/18/2024    ALT 7 01/18/2024    ALKPHOS 77 01/18/2024    PROT 8.0 02/17/2014    TP 8.6 (H) 01/18/2024    BILITOT 0.3 02/17/2014    TBILI 0.39 01/18/2024    EGFR 133 01/25/2024       VTE Prophylaxis: Heparin      Akhil Mills MD  1/26/2024  6:33 AM

## 2024-01-27 LAB
ANION GAP SERPL CALCULATED.3IONS-SCNC: 6 MMOL/L
BASOPHILS # BLD AUTO: 0.03 THOUSANDS/ÂΜL (ref 0–0.1)
BASOPHILS NFR BLD AUTO: 1 % (ref 0–1)
BUN SERPL-MCNC: 17 MG/DL (ref 5–25)
CALCIUM SERPL-MCNC: 8.5 MG/DL (ref 8.4–10.2)
CHLORIDE SERPL-SCNC: 99 MMOL/L (ref 96–108)
CO2 SERPL-SCNC: 27 MMOL/L (ref 21–32)
CREAT SERPL-MCNC: 0.41 MG/DL (ref 0.6–1.3)
EOSINOPHIL # BLD AUTO: 0.38 THOUSAND/ÂΜL (ref 0–0.61)
EOSINOPHIL NFR BLD AUTO: 6 % (ref 0–6)
ERYTHROCYTE [DISTWIDTH] IN BLOOD BY AUTOMATED COUNT: 20.1 % (ref 11.6–15.1)
GFR SERPL CREATININE-BSD FRML MDRD: 125 ML/MIN/1.73SQ M
GLUCOSE SERPL-MCNC: 123 MG/DL (ref 65–140)
GLUCOSE SERPL-MCNC: 131 MG/DL (ref 65–140)
GLUCOSE SERPL-MCNC: 79 MG/DL (ref 65–140)
GLUCOSE SERPL-MCNC: 84 MG/DL (ref 65–140)
GLUCOSE SERPL-MCNC: 86 MG/DL (ref 65–140)
GLUCOSE SERPL-MCNC: 95 MG/DL (ref 65–140)
HCT VFR BLD AUTO: 30.3 % (ref 36.5–49.3)
HGB BLD-MCNC: 8.6 G/DL (ref 12–17)
IMM GRANULOCYTES # BLD AUTO: 0.02 THOUSAND/UL (ref 0–0.2)
IMM GRANULOCYTES NFR BLD AUTO: 0 % (ref 0–2)
LYMPHOCYTES # BLD AUTO: 1.91 THOUSANDS/ÂΜL (ref 0.6–4.47)
LYMPHOCYTES NFR BLD AUTO: 30 % (ref 14–44)
MCH RBC QN AUTO: 22.9 PG (ref 26.8–34.3)
MCHC RBC AUTO-ENTMCNC: 28.4 G/DL (ref 31.4–37.4)
MCV RBC AUTO: 81 FL (ref 82–98)
MONOCYTES # BLD AUTO: 0.58 THOUSAND/ÂΜL (ref 0.17–1.22)
MONOCYTES NFR BLD AUTO: 9 % (ref 4–12)
NEUTROPHILS # BLD AUTO: 3.47 THOUSANDS/ÂΜL (ref 1.85–7.62)
NEUTS SEG NFR BLD AUTO: 54 % (ref 43–75)
NRBC BLD AUTO-RTO: 0 /100 WBCS
PLATELET # BLD AUTO: 332 THOUSANDS/UL (ref 149–390)
PMV BLD AUTO: 10.6 FL (ref 8.9–12.7)
POTASSIUM SERPL-SCNC: 3.7 MMOL/L (ref 3.5–5.3)
RBC # BLD AUTO: 3.75 MILLION/UL (ref 3.88–5.62)
SODIUM SERPL-SCNC: 132 MMOL/L (ref 135–147)
WBC # BLD AUTO: 6.39 THOUSAND/UL (ref 4.31–10.16)

## 2024-01-27 PROCEDURE — 87147 CULTURE TYPE IMMUNOLOGIC: CPT | Performed by: THORACIC SURGERY (CARDIOTHORACIC VASCULAR SURGERY)

## 2024-01-27 PROCEDURE — 99232 SBSQ HOSP IP/OBS MODERATE 35: CPT | Performed by: INTERNAL MEDICINE

## 2024-01-27 PROCEDURE — 87081 CULTURE SCREEN ONLY: CPT | Performed by: THORACIC SURGERY (CARDIOTHORACIC VASCULAR SURGERY)

## 2024-01-27 PROCEDURE — 85025 COMPLETE CBC W/AUTO DIFF WBC: CPT | Performed by: STUDENT IN AN ORGANIZED HEALTH CARE EDUCATION/TRAINING PROGRAM

## 2024-01-27 PROCEDURE — 99024 POSTOP FOLLOW-UP VISIT: CPT | Performed by: THORACIC SURGERY (CARDIOTHORACIC VASCULAR SURGERY)

## 2024-01-27 PROCEDURE — 82948 REAGENT STRIP/BLOOD GLUCOSE: CPT

## 2024-01-27 PROCEDURE — 80048 BASIC METABOLIC PNL TOTAL CA: CPT | Performed by: STUDENT IN AN ORGANIZED HEALTH CARE EDUCATION/TRAINING PROGRAM

## 2024-01-27 PROCEDURE — 99024 POSTOP FOLLOW-UP VISIT: CPT | Performed by: PLASTIC SURGERY

## 2024-01-27 RX ADMIN — HYDROMORPHONE HYDROCHLORIDE 0.5 MG: 1 INJECTION, SOLUTION INTRAMUSCULAR; INTRAVENOUS; SUBCUTANEOUS at 01:39

## 2024-01-27 RX ADMIN — OXYCODONE HYDROCHLORIDE 10 MG: 10 TABLET ORAL at 00:25

## 2024-01-27 RX ADMIN — HYDROMORPHONE HYDROCHLORIDE 0.5 MG: 1 INJECTION, SOLUTION INTRAMUSCULAR; INTRAVENOUS; SUBCUTANEOUS at 16:03

## 2024-01-27 RX ADMIN — HYDROMORPHONE HYDROCHLORIDE 0.5 MG: 1 INJECTION, SOLUTION INTRAMUSCULAR; INTRAVENOUS; SUBCUTANEOUS at 22:09

## 2024-01-27 RX ADMIN — HEPARIN SODIUM 5000 UNITS: 5000 INJECTION INTRAVENOUS; SUBCUTANEOUS at 13:42

## 2024-01-27 RX ADMIN — PANTOPRAZOLE SODIUM 40 MG: 40 TABLET, DELAYED RELEASE ORAL at 05:00

## 2024-01-27 RX ADMIN — HEPARIN SODIUM 5000 UNITS: 5000 INJECTION INTRAVENOUS; SUBCUTANEOUS at 05:00

## 2024-01-27 RX ADMIN — HYDROMORPHONE HYDROCHLORIDE 0.5 MG: 1 INJECTION, SOLUTION INTRAMUSCULAR; INTRAVENOUS; SUBCUTANEOUS at 10:37

## 2024-01-27 RX ADMIN — Medication 250 MG: at 17:09

## 2024-01-27 RX ADMIN — OXYCODONE HYDROCHLORIDE 10 MG: 10 TABLET ORAL at 20:27

## 2024-01-27 RX ADMIN — OXYCODONE HYDROCHLORIDE 10 MG: 10 TABLET ORAL at 13:42

## 2024-01-27 RX ADMIN — HEPARIN SODIUM 5000 UNITS: 5000 INJECTION INTRAVENOUS; SUBCUTANEOUS at 22:09

## 2024-01-27 RX ADMIN — ASPIRIN 81 MG: 81 TABLET, COATED ORAL at 08:24

## 2024-01-27 RX ADMIN — Medication 250 MG: at 08:24

## 2024-01-27 RX ADMIN — OXYCODONE HYDROCHLORIDE 10 MG: 10 TABLET ORAL at 08:24

## 2024-01-27 RX ADMIN — CEFAZOLIN SODIUM 2000 MG: 2 SOLUTION INTRAVENOUS at 05:00

## 2024-01-27 RX ADMIN — CEFAZOLIN SODIUM 2000 MG: 2 SOLUTION INTRAVENOUS at 22:09

## 2024-01-27 RX ADMIN — CEFAZOLIN SODIUM 2000 MG: 2 SOLUTION INTRAVENOUS at 13:42

## 2024-01-27 NOTE — OP NOTE
OPERATIVE REPORT  PATIENT NAME: Rikki Arguello    :  1960  MRN: [id number]  Pt Location: [facility] OR ROOM 06    SURGERY DATE: 2024    Surgeons and Role:  Panel 1:     * Srikanth Maradiaga MD - Primary     * Jairo Chaudhary DO - Assisting  Panel 2:     * Alessandra Bell MD - Primary     * Rhona Jiménez PA-C - Assisting    Preop Diagnosis:  Other chronic osteomyelitis, other site (HCC) [M86.68]  Subacute osteomyelitis, other site (HCC) [M86.28]    Post-Op Diagnosis Codes:     * Other chronic osteomyelitis, other site (HCC) [M86.68]     * Subacute osteomyelitis, other site (HCC) [M86.28]    Procedure(s):  Right - RESECTION RIB R CHEST WALL RESECTION/RIB RESECTION/RECONSTRUCTION  latissimus flap    Specimen(s):  ID Type Source Tests Collected by Time Destination   1 : right chest bursa Tissue Bursa/Synovial Cyst TISSUE EXAM Srikanth Maradiaga MD 2024 1000    2 : 8th rib bone Tissue Bone TISSUE EXAM Srikanth Maradiaga MD 2024 1015    3 : additional bone Tissue Bone TISSUE EXAM Srikanth Maradiaga MD 2024 1016    A : 8th rib culture- swab Tissue Chest Wall ANAEROBIC CULTURE AND GRAM STAIN, FUNGAL CULTURE, CULTURE, TISSUE AND GRAM STAIN Srikanth Maradiaga MD 2024 1020    B : 8th rib culture Tissue Chest Wall ANAEROBIC CULTURE AND GRAM STAIN, FUNGAL CULTURE, CULTURE, TISSUE AND GRAM STAIN Srikanth Maradiaga MD 2024 1019        Estimated Blood Loss:   Minimal    Drains:  Closed/Suction Drain Lateral RUQ Bulb 15 Fr. (Active)   Site Description Unable to view 24 05   Dressing Status Clean;Dry;Intact 24 0541   Drainage Appearance Serosanguineous 24   Status To bulb suction 24 0541   Output (mL) 0 mL 24 0600   Number of days: 2       Closed/Suction Drain Lateral RUQ Bulb 15 Fr. (Active)   Site Description Unable to view 24 0541   Dressing Status Clean;Dry;Intact 24 0541   Drainage Appearance  Serosanguineous 01/26/24 0541   Status To bulb suction 01/26/24 0541   Output (mL) 0 mL 01/27/24 0600   Number of days: 2       Closed/Suction Drain Lateral RUQ Bulb 15 Fr. (Active)   Site Description Unable to view 01/26/24 0541   Dressing Status Clean;Dry;Intact 01/26/24 0541   Drainage Appearance Serosanguineous 01/26/24 0541   Status To bulb suction 01/26/24 0541   Output (mL) 0 mL 01/27/24 0600   Number of days: 2       Colostomy LLQ (Active)   Stomal Appliance 2 piece 01/25/24 2213   Stoma Assessment Pale 01/25/24 2213   Stoma Shape Recessed 01/25/24 2213   Peristomal Assessment DISHA 01/25/24 2213   Treatment Bag change 01/23/24 2225   Output (mL) 0 mL 01/27/24 0600   Number of days: 10       Suprapubic Catheter (Active)   Site Assessment Clean;Intact 01/26/24 1925   Dressing Status Open to air 01/26/24 1925   Dressing Type Open to air 01/26/24 1925   Collection Container Standard drainage bag 01/25/24 2213   Irrigant Sterile water 01/26/24 0919   Suprapubic Irrigation Intake (mL) 10 mL 01/26/24 0919   Output (mL) 525 mL 01/27/24 0738   Number of days: 10       Anesthesia Type:   General    Operative Indications:  Other chronic osteomyelitis, other site (HCC) [M86.68]  Subacute osteomyelitis, other site (HCC) [M86.28]  Chronic pressure ulcer of right chest wall  Paraplegia    Operative Findings:  Chronic wound bursa, [id number]  Latissimus muscle atrophy and fatty infiltration due to disuse.  Latissimus flap pink and viable throughout.  Segment of 8th rib resected due to pathologic fx and suspected osteo    Complications:   None    Procedure and Technique:  Excision of chronic wound bursa of right chest wall pressure ulcer, [id number]  Latissimus dorsi muscle flap for wound coverage  Local advacnement flap closure of back donor site with progressive tension sutures, 300cm2.  Application of KULWINDER NPWT dressing, 450cm2, Non-DME.    Patient has a large chest wall pressure ulcer involving the skin, subcutaneous tissues,  and chest wall.  This wound was created by the way he sits in his wheelchair and has been managed conservatively.  He now has evidence of osteomyelitis in his eighth rib and is being brought to the operating room for resection of the wound,infected bone, muscle flap, and wound closure.     The patient was identified in the preoperative holding area and preoperative markings were made.  Patient was then transferred to the operating room and placed supine operating table.  Venodyne boots were placed and activated.  All pressure points were appropriately and essentially padded.  After induction general endotracheal anesthesia, the patient was placed into the left lateral decubitus position on a gel padded beanbag with the right side up.  Once again all pressure points were appropriately padded.  The entire right chest and right back area was then sterilely prepped and draped with Betadine in the usual fashion.  Attention then focused on the right lateral chest wound which was present over the seventh and eighth ribs in the anterior axillary line.  Initial wound measurements of the skin opening were 6 x 3 x 2 cm with extensive undermining from 7:00 to 11:00.  The skin edges were marked out in elliptical fashion.  The skin was then incised with a 10 blade scalpel and taken down to skin subcutaneous tissue the wound bursa was then encountered.  The bursa was grasped with Adson Brown forceps and excised circumferentially using electric cautery from the adjacent surrounding subcutaneous tissue and underlying ribs and periosteum.  The entire bursa was excised en bloc and sent to pathology as a specimen.  The remaining wound was extensive and measured 15 x 20 cm in size with underlying exposed seventh and eighth ribs.  At this point, Dr. Maradiaga entered the operating field and performed his portion of the procedure with resection of the eighth rib due to pathologic fracture and suspected osteomyelitis.  Please see his  dictation for additional details.  Once he completed his portion of procedure.  The entire wound was copiously irrigated with 1 L of Ancef irrigation.  A moist lap pad was then placed in the wound.  At this point attention was focused on harvesting the latissimus muscle for wound coverage.  An access incision was made vertically along the axillary line extending toward the axilla for approximately 10 cm.  Incision was taken down through skin and subcu tissue until the latissimus fascia was reached.  Dissection then proceeded anteriorly until the anterior edge of the latissimus muscle was identified.  Dissection proceeded similarly in the posterior fashion to the posterior edge of the latissimus muscle was also identified.  Dissection then proceeded inferiorly until the inferior edge of the chest was identified.  The muscle was then elevated and harvested in a distal to proximal manner.  Multiple intercostal perforators were encountered and ligated with hemoclips.  Once enough mobility was obtained, the muscle was then rotated into the chest wall defect.  The muscle laid over the entire defect covering the exposed ribs and resected area.  The muscle appeared pink and viable throughout.  A 15 Bengali Aubrey drain was placed in the base of the defect.  The muscle was then inset into the defect with 2-0 Vicryl figure-of-eight sutures.  Attention then focused on closing the back donor site.  Additional 2- #15 Bengali Aubrey drains were placed in the donor site and over the latissimus muscle.  A large advancement flap measuring 15 x 20 cm was developed at the level of the rib perichondrium and paraspinous fascia.  The flap was advanced into the defect with progressive tension sutures using 2-0 PDS strata fix to anchor the flap to the underlying chest wall.  Total area of local advancement flap closure was 300 cm².  The incisions were then closed in multiple layers.  The superficial fascia and deep subcutaneous tissue were  closed with 2-0 Vicryl sutures in figure-of-eight fashion.  The deep dermal layer was closed with running 3-0 Monocryl STRATAFIX.  The skin was closed with 3-0 Monocryl STRATAFIX in a running subcuticular closure.  The incision sites were cleansed and dried.  Then a addy negative pressure wound therapy dressing, 15 x 30 cm was placed over the incision to aid in wound healing.  Total area of negative pressure wound therapy dressing was 450 cm², non-DME.  The drains were placed on bulb suction.  The patient tolerated the procedure well.  There were no complications.  I was present for the entire procedure.  There is no qualified resident available for the case.   A physician assistant was required during the procedure for retraction, tissue handling, dissection and suturing.    Patient Disposition:  PACU         SIGNATURE: Alessandra Bell MD  DATE: January 27, 2024  TIME: 9:26 AM

## 2024-01-27 NOTE — PROGRESS NOTES
Progress Note - Infectious Disease   Rikki Arguello 64 y.o. male MRN: [id number]  Unit/Bed#: [facility] 402-01 Encounter: [id number]      Impression/Recommendations:  Chronic osteomyelitis of the right 6 through 8 ribs with pathologic fracture.  Etiology of osteomyelitis is chronically nonhealing right chest wall decubitus ulcer.  Patient was started on cefazolin for possible cellulitis.  Patient is status post I&D with partial resection of 8th rib and latissimus muscle flap and wound closure.  Patient will need long-term IV antibiotic.  Continue high-dose IV cefazolin.    Follow-up on operative culture.  Antibiotic adjustment per operative cultures.  If operative cultures have no growth, given cefazolin preop, will continue IV cefazolin for duration of treatment.  Treat x 6 weeks postop, through 3/8.     Possible chest wall cellulitis, with patient started on antibiotic preop.  Antibiotic plan as in above.  Monitor temperature/WBC.     Chronic and progressing right chest wall decubitus ulcer, likely secondary to lack of padding of the wheelchair.    Continue local wound care for now.  Aggressive offloading of right chest wall.     4. Longstanding paraplegia, wheelchair-bound, with multiple prior decubitus ulcers, including right lower leg resulting in right AKA.     5. DM, reasonably well-controlled.  This contributes to poor wound healing and infection above.  Management per primary service.     Discussed with patient in detail regarding the above plan.     Antibiotics:  Cefazolin  POD # 2     Subjective:  Patient has stable right chest wall pain, controlled.  Temperature is down.  No chills.  He is tolerating antibiotic well.  No nausea, vomiting or diarrhea.     Objective:  Vitals:  Temp:  [98.8 °F (37.1 °C)-99.8 °F (37.7 °C)] 98.8 °F (37.1 °C)  HR:  [79-83] 80  Resp:  [17] 17  BP: (108-127)/(64-73) 127/73  SpO2:  [97 %-98 %] 98 %  Temp (24hrs), Av.3 °F (37.4 °C), Min:98.8 °F (37.1 °C), Max:99.8 °F (37.7  °C)  Current: Temperature: 98.8 °F (37.1 °C)    Physical Exam:     General: Awake, alert, cooperative, no distress.   Neck:  Supple. No mass.  No lymphadenopathy.   Chest:  Right chest wall with dressing in place.  Dressing is dry.  No erythema/warmth.  Moderate tenderness.  Drain serous.   Lungs: Expansion symmetric, no rales, no wheezing, respirations unlabored.   Heart:  Regular rate and rhythm, S1 and S2 normal, no murmur.   Abdomen: Soft, nondistended, non-tender, bowel sounds active all four quadrants, no masses, no organomegaly.   Extremities: No edema.  Stable contractures.  No erythema/warmth. No draining ulcer. Nontender to palpation.   Skin:  No rash.   Neuro: Chronic paraplegia.     Invasive Devices       Peripheral Intravenous Line  Duration             Peripheral IV 01/25/24 Distal;Left;Upper;Ventral (anterior) Arm 2 days    Peripheral IV 01/25/24 Left Forearm 2 days              Drain  Duration             Colostomy LLQ 9 days    Suprapubic Catheter 9 days    Closed/Suction Drain Lateral RUQ Bulb 15 Fr. 1 day    Closed/Suction Drain Lateral RUQ Bulb 15 Fr. 1 day    Closed/Suction Drain Lateral RUQ Bulb 15 Fr. 1 day                    Labs studies:   I have personally reviewed pertinent labs.  Results from last 7 days   Lab Units 01/27/24  0600 01/26/24  0630 01/25/24  0518   POTASSIUM mmol/L 3.7 4.4 4.3   CHLORIDE mmol/L 99 107 102   CO2 mmol/L 27 21 30   BUN mg/dL 17 20 22   CREATININE mg/dL 0.41* 0.36* 0.35*   EGFR ml/min/1.73sq m 125 132 133   CALCIUM mg/dL 8.5 7.7* 8.8     Results from last 7 days   Lab Units 01/27/24  0600 01/26/24  0832 01/25/24  0518   WBC Thousand/uL 6.39 8.83 5.50   HEMOGLOBIN g/dL 8.6* 8.4* 9.3*   PLATELETS Thousands/uL 332 335 359     Results from last 7 days   Lab Units 01/25/24  1020 01/25/24  1019   GRAM STAIN RESULT  1+ Polys  No bacteria seen Rare Polys  No organisms seen       Imaging Studies:   I have personally reviewed pertinent imaging study reports and images  in PACS.    EKG, Pathology, and Other Studies:   I have personally reviewed pertinent reports.

## 2024-01-27 NOTE — PLAN OF CARE
Problem: INFECTION - ADULT  Goal: Absence or prevention of progression during hospitalization  Description: INTERVENTIONS:  - Assess and monitor for signs and symptoms of infection  - Monitor lab/diagnostic results  - Monitor all insertion sites, i.e. indwelling lines, tubes, and drains  - Monitor endotracheal if appropriate and nasal secretions for changes in amount and color  - [place] appropriate cooling/warming therapies per order  - Administer medications as ordered  - Instruct and encourage patient and family to use good hand hygiene technique  - Identify and instruct in appropriate isolation precautions for identified infection/condition  Outcome: Progressing

## 2024-01-27 NOTE — PROGRESS NOTES
"Progress Note - Plastic Surgery   Rikki Farooqcat 64 y.o. male MRN: [id number]  Unit/Bed#: [facility] 402-01 Encounter: [id number]    Assessment:  POD#2 S/P Excision of right chest wound bursa, right latissimus dorsi flap for wound coverage.    Plan:  Continue KULWINDER dressing x7 days  Continue drains  Continue aggressive off loading of right chest.   Will need wheelchair adjusted prior to d/c home.  Will continue to follow.    Subjective/Objective   Subjective: Pt doing well, no complaints     Objective:     Blood pressure 127/73, pulse 80, temperature 98.8 °F (37.1 °C), resp. rate 17, height 5' 7\" (1.702 m), weight 65.8 kg (145 lb), SpO2 98%.,Body mass index is 22.71 kg/m².      Intake/Output Summary (Last 24 hours) at 1/27/2024 0917  Last data filed at 1/27/2024 0738  Gross per 24 hour   Intake 832 ml   Output 3640 ml   Net -2808 ml       Invasive Devices       Peripheral Intravenous Line  Duration             Peripheral IV 01/25/24 Distal;Left;Upper;Ventral (anterior) Arm 2 days    Peripheral IV 01/25/24 Left Forearm 2 days              Drain  Duration             Colostomy LLQ 9 days    Suprapubic Catheter 9 days    Closed/Suction Drain Lateral RUQ Bulb 15 Fr. 1 day    Closed/Suction Drain Lateral RUQ Bulb 15 Fr. 1 day    Closed/Suction Drain Lateral RUQ Bulb 15 Fr. 1 day                    Physical Exam:   Gen: AAOx3, NAD  Right chest: KULWINDER dressing in place and functioning, skin flaps viable, Drains with SSOPx3. No signs of hematoma, seroma.    Lab, Imaging and other studies:CBC:   Lab Results   Component Value Date    WBC 6.39 01/27/2024    HGB 8.6 (L) 01/27/2024    HCT 30.3 (L) 01/27/2024    MCV 81 (L) 01/27/2024     01/27/2024    RBC 3.75 (L) 01/27/2024    MCH 22.9 (L) 01/27/2024    MCHC 28.4 (L) 01/27/2024    RDW 20.1 (H) 01/27/2024    MPV 10.6 01/27/2024    NRBC 0 01/27/2024     VTE Pharmacologic Prophylaxis: Heparin  VTE Mechanical Prophylaxis: sequential compression device  "

## 2024-01-27 NOTE — PROGRESS NOTES
Progress Note - Thoracic Surgery   Rikki Farooqcat 64 y.o. male MRN: [id number]  Unit/Bed#: [facility] 402-01 Encounter: [id number]    Assessment:  64-year-old male with chronic chest wound and rib osteomyelitis.  Status post 1/25 resection of eighth rib osteomyelitis, latissimus flap closure.  History of paraplegia, colostomy, right leg amputation, decubitus ulcer.    Plan:  - Continue regular diet, nutritional supplements  - Follow-up intraoperative bone cultures  - Drain management per plastic surgery  - Continue IV antibiotics  - Pain and nausea control PRN  - Encourage IS  - DVT ppx     Subjective:  No acute events overnight.  Patient reports pain around drain site.  Denies nausea, vomiting, fever, chills, shortness of breath, chest pain.  Reports tolerating p.o. intake without issue.  Voiding and passing stool.    Objective:    Vitals:   Afebrile, Normal VS on 3 L nasal cannula  Temp:  [99.2 °F (37.3 °C)-99.8 °F (37.7 °C)] 99.8 °F (37.7 °C)  HR:  [79-83] 83  Resp:  [16] 16  BP: (108-130)/(64-67) 108/64  Body mass index is 22.71 kg/m².    Physical Exam:   Gen: NAD, Resting in bed  Head: Normal Cephalic, Atraumatic  Eye: Right eye opacity  CV: Regular rate  Pulm: Normal work of breathing, No respiratory distress on 3 L nasal cannula   Abd: Soft, Non-Distended, Non-Tender   Ext: Right AKA, left leg without edema  Skin: Warm, Dry, Intact  Yolande drain in place, BALTAZAR x 3 with serosanguineous output    I/O:  U.O: 3 L.  BALTAZAR 1-3: 20, 110, 0 cc serosanguinous  Ostomy output: 0 cc, scant stool in bag    Intake/Output Summary (Last 24 hours) at 1/27/2024 0706  Last data filed at 1/27/2024 0600  Gross per 24 hour   Intake 1068 ml   Output 3115 ml   Net -2047 ml       Lab Results:  01/27/24 labs pending  Recent Labs     01/25/24  0518 01/26/24  0630 01/26/24  0832 01/27/24  0600   WBC 5.50  --  8.83 6.39   HGB 9.3*  --  8.4* 8.6*     --  335 332   SODIUM 137 136  --   --    K 4.3 4.4  --   --     107  --   --    CO2 30  21  --   --    BUN 22 20  --   --    CREATININE 0.35* 0.36*  --   --    GLUC 89 76  --   --    CALCIUM 8.8 7.7*  --   --    MG 1.9 2.1  --   --    PHOS 3.6 3.3  --   --        ---    Dai Vinson MD  General Surgery PGY-I

## 2024-01-28 LAB
ANION GAP SERPL CALCULATED.3IONS-SCNC: 5 MMOL/L
BACTERIA SPEC ANAEROBE CULT: NORMAL
BACTERIA SPEC ANAEROBE CULT: NORMAL
BACTERIA TISS AEROBE CULT: ABNORMAL
BACTERIA TISS AEROBE CULT: ABNORMAL
BASOPHILS # BLD AUTO: 0.07 THOUSANDS/ÂΜL (ref 0–0.1)
BASOPHILS NFR BLD AUTO: 1 % (ref 0–1)
BUN SERPL-MCNC: 17 MG/DL (ref 5–25)
CALCIUM SERPL-MCNC: 8.7 MG/DL (ref 8.4–10.2)
CHLORIDE SERPL-SCNC: 104 MMOL/L (ref 96–108)
CO2 SERPL-SCNC: 26 MMOL/L (ref 21–32)
CREAT SERPL-MCNC: 0.37 MG/DL (ref 0.6–1.3)
EOSINOPHIL # BLD AUTO: 0.35 THOUSAND/ÂΜL (ref 0–0.61)
EOSINOPHIL NFR BLD AUTO: 6 % (ref 0–6)
ERYTHROCYTE [DISTWIDTH] IN BLOOD BY AUTOMATED COUNT: 19.9 % (ref 11.6–15.1)
GFR SERPL CREATININE-BSD FRML MDRD: 130 ML/MIN/1.73SQ M
GLUCOSE SERPL-MCNC: 115 MG/DL (ref 65–140)
GLUCOSE SERPL-MCNC: 160 MG/DL (ref 65–140)
GLUCOSE SERPL-MCNC: 84 MG/DL (ref 65–140)
GLUCOSE SERPL-MCNC: 89 MG/DL (ref 65–140)
GRAM STN SPEC: ABNORMAL
GRAM STN SPEC: ABNORMAL
HCT VFR BLD AUTO: 28 % (ref 36.5–49.3)
HGB BLD-MCNC: 8.1 G/DL (ref 12–17)
IMM GRANULOCYTES # BLD AUTO: 0.02 THOUSAND/UL (ref 0–0.2)
IMM GRANULOCYTES NFR BLD AUTO: 0 % (ref 0–2)
LYMPHOCYTES # BLD AUTO: 1.73 THOUSANDS/ÂΜL (ref 0.6–4.47)
LYMPHOCYTES NFR BLD AUTO: 30 % (ref 14–44)
MCH RBC QN AUTO: 22.8 PG (ref 26.8–34.3)
MCHC RBC AUTO-ENTMCNC: 28.9 G/DL (ref 31.4–37.4)
MCV RBC AUTO: 79 FL (ref 82–98)
MONOCYTES # BLD AUTO: 0.53 THOUSAND/ÂΜL (ref 0.17–1.22)
MONOCYTES NFR BLD AUTO: 9 % (ref 4–12)
NEUTROPHILS # BLD AUTO: 3.06 THOUSANDS/ÂΜL (ref 1.85–7.62)
NEUTS SEG NFR BLD AUTO: 54 % (ref 43–75)
NRBC BLD AUTO-RTO: 0 /100 WBCS
PLATELET # BLD AUTO: 364 THOUSANDS/UL (ref 149–390)
PMV BLD AUTO: 10.5 FL (ref 8.9–12.7)
POTASSIUM SERPL-SCNC: 3.8 MMOL/L (ref 3.5–5.3)
RBC # BLD AUTO: 3.56 MILLION/UL (ref 3.88–5.62)
SODIUM SERPL-SCNC: 135 MMOL/L (ref 135–147)
WBC # BLD AUTO: 5.76 THOUSAND/UL (ref 4.31–10.16)

## 2024-01-28 PROCEDURE — 80048 BASIC METABOLIC PNL TOTAL CA: CPT

## 2024-01-28 PROCEDURE — 82948 REAGENT STRIP/BLOOD GLUCOSE: CPT

## 2024-01-28 PROCEDURE — 85025 COMPLETE CBC W/AUTO DIFF WBC: CPT

## 2024-01-28 PROCEDURE — 99024 POSTOP FOLLOW-UP VISIT: CPT | Performed by: THORACIC SURGERY (CARDIOTHORACIC VASCULAR SURGERY)

## 2024-01-28 PROCEDURE — 99232 SBSQ HOSP IP/OBS MODERATE 35: CPT | Performed by: INTERNAL MEDICINE

## 2024-01-28 RX ADMIN — OXYCODONE HYDROCHLORIDE 10 MG: 10 TABLET ORAL at 13:28

## 2024-01-28 RX ADMIN — OXYCODONE HYDROCHLORIDE 10 MG: 10 TABLET ORAL at 05:37

## 2024-01-28 RX ADMIN — Medication 250 MG: at 17:41

## 2024-01-28 RX ADMIN — HEPARIN SODIUM 5000 UNITS: 5000 INJECTION INTRAVENOUS; SUBCUTANEOUS at 05:31

## 2024-01-28 RX ADMIN — HYDROMORPHONE HYDROCHLORIDE 0.5 MG: 1 INJECTION, SOLUTION INTRAMUSCULAR; INTRAVENOUS; SUBCUTANEOUS at 07:28

## 2024-01-28 RX ADMIN — CEFAZOLIN SODIUM 2000 MG: 2 SOLUTION INTRAVENOUS at 05:31

## 2024-01-28 RX ADMIN — CEFAZOLIN SODIUM 2000 MG: 2 SOLUTION INTRAVENOUS at 21:11

## 2024-01-28 RX ADMIN — PANTOPRAZOLE SODIUM 40 MG: 40 TABLET, DELAYED RELEASE ORAL at 05:31

## 2024-01-28 RX ADMIN — ASPIRIN 81 MG: 81 TABLET, COATED ORAL at 08:01

## 2024-01-28 RX ADMIN — HYDROMORPHONE HYDROCHLORIDE 0.5 MG: 1 INJECTION, SOLUTION INTRAMUSCULAR; INTRAVENOUS; SUBCUTANEOUS at 15:04

## 2024-01-28 RX ADMIN — HEPARIN SODIUM 5000 UNITS: 5000 INJECTION INTRAVENOUS; SUBCUTANEOUS at 13:29

## 2024-01-28 RX ADMIN — HYDROMORPHONE HYDROCHLORIDE 0.5 MG: 1 INJECTION, SOLUTION INTRAMUSCULAR; INTRAVENOUS; SUBCUTANEOUS at 19:58

## 2024-01-28 RX ADMIN — Medication 250 MG: at 08:01

## 2024-01-28 RX ADMIN — OXYCODONE HYDROCHLORIDE 10 MG: 10 TABLET ORAL at 17:40

## 2024-01-28 RX ADMIN — CEFAZOLIN SODIUM 2000 MG: 2 SOLUTION INTRAVENOUS at 13:29

## 2024-01-28 RX ADMIN — HEPARIN SODIUM 5000 UNITS: 5000 INJECTION INTRAVENOUS; SUBCUTANEOUS at 21:12

## 2024-01-28 NOTE — PROGRESS NOTES
Progress Note - Infectious Disease   Rikki Arguello 64 y.o. male MRN: [id number]  Unit/Bed#: [facility] 402-01 Encounter: [id number]      Impression/Recommendations:  Chronic osteomyelitis of the right 6 through 8 ribs with pathologic fracture.  Etiology of osteomyelitis is chronically nonhealing right chest wall decubitus ulcer.  Patient was started on cefazolin for possible cellulitis.  Patient is status post I&D with partial resection of 8th rib and latissimus muscle flap and wound closure.  Wound culture is growing Staph aureus.  Patient will need long-term IV antibiotic.  Continue high-dose IV cefazolin.    Follow-up on operative culture.  Antibiotic adjustment per operative cultures.   Treat x 6 weeks postop, through 3/8.     Possible chest wall cellulitis, with patient started on antibiotic preop.  Antibiotic plan as in above.  Monitor temperature/WBC.     Chronic and progressing right chest wall decubitus ulcer, likely secondary to lack of padding of the wheelchair.    Continue local wound care for now.  Aggressive offloading of right chest wall.     4. Longstanding paraplegia, wheelchair-bound, with multiple prior decubitus ulcers, including right lower leg resulting in right AKA.     5. DM, reasonably well-controlled.  This contributes to poor wound healing and infection above.  Management per primary service.     Discussed with patient in detail regarding the above plan.     Antibiotics:  Cefazolin  POD # 3     Subjective:  Patient with improved pain in right chest wall.  Temperature stays down.  No chills.  He is tolerating antibiotic well.  No nausea, vomiting or diarrhea.     Objective:  Vitals:  Temp:  [98.5 °F (36.9 °C)-98.6 °F (37 °C)] 98.6 °F (37 °C)  HR:  [72-84] 84  BP: (114-128)/(64-75) 124/65  SpO2:  [95 %-99 %] 95 %  Temp (24hrs), Av.6 °F (37 °C), Min:98.5 °F (36.9 °C), Max:98.6 °F (37 °C)  Current: Temperature: 98.6 °F (37 °C)    Physical Exam:     General: Awake, alert, cooperative, no  distress.   Neck:  Supple. No mass.  No lymphadenopathy.   Chest:  Wound with dressing in place.  Dressing is dry.  No erythema/will be on dressing.  Mild and improved tenderness.  Drain serous.   Lungs: Expansion symmetric, no rales, no wheezing, respirations unlabored.   Heart:  Regular rate and rhythm, S1 and S2 normal, no murmur.   Abdomen: Soft, nondistended, non-tender, bowel sounds active all four quadrants, no masses, no organomegaly.   Extremities: No edema. No erythema/warmth. No ulcer. Nontender to palpation.   Skin:  No rash.   Neuro: Moves all extremities.     Invasive Devices       Peripheral Intravenous Line  Duration             Peripheral IV 01/25/24 Distal;Left;Upper;Ventral (anterior) Arm 3 days              Drain  Duration             Colostomy LLQ 10 days    Suprapubic Catheter 10 days    Closed/Suction Drain Lateral RUQ Bulb 15 Fr. 2 days    Closed/Suction Drain Lateral RUQ Bulb 15 Fr. 2 days    Closed/Suction Drain Lateral RUQ Bulb 15 Fr. 2 days                    Labs studies:   I have personally reviewed pertinent labs.  Results from last 7 days   Lab Units 01/28/24  0614 01/27/24  0600 01/26/24  0630   POTASSIUM mmol/L 3.8 3.7 4.4   CHLORIDE mmol/L 104 99 107   CO2 mmol/L 26 27 21   BUN mg/dL 17 17 20   CREATININE mg/dL 0.37* 0.41* 0.36*   EGFR ml/min/1.73sq m 130 125 132   CALCIUM mg/dL 8.7 8.5 7.7*     Results from last 7 days   Lab Units 01/28/24  0614 01/27/24  0600 01/26/24  0832   WBC Thousand/uL 5.76 6.39 8.83   HEMOGLOBIN g/dL 8.1* 8.6* 8.4*   PLATELETS Thousands/uL 364 332 335     Results from last 7 days   Lab Units 01/25/24  1020 01/25/24  1019   GRAM STAIN RESULT  1+ Polys  No bacteria seen Rare Polys  No organisms seen       Imaging Studies:   I have personally reviewed pertinent imaging study reports and images in PACS.    EKG, Pathology, and Other Studies:   I have personally reviewed pertinent reports.

## 2024-01-28 NOTE — PROGRESS NOTES
Progress Note - Thoracic Surgery   Rikki Farooqcat 64 y.o. male MRN: [id number]  Unit/Bed#: [facility] 402-01 Encounter: [id number]    Assessment:  64-year-old male with chronic chest wound and rib osteomyelitis.    Status post 1/25 resection of eighth rib osteomyelitis, latissimus flap closure.    History of paraplegia, colostomy, right leg amputation, decubitus ulcer.    VSS   UOP 1.5  Drains: 180 cc serosang      Plan:  - Continue regular diet, nutritional supplements  - Follow-up intraoperative bone cultures  - Drain management per plastic surgery  - Continue IV antibiotics - f/u ID plan re: outpt  - Pain and nausea control PRN  - Encourage IS  - DVT ppx         Subjective:  Patient seen and examined bedside.  No new sx.  Denies fevers/chills.    Objective:    Vitals:     Temp:  [98.5 °F (36.9 °C)-98.6 °F (37 °C)] 98.6 °F (37 °C)  HR:  [72-81] 72  BP: (114-128)/(64-75) 114/64  Body mass index is 22.71 kg/m².    Physical Exam:   General - no acute distress, responsive and cooperative  CV - warm, regular rate  Pulm - normal work of breathing, no respiratory distress  Abd - soft, nondistended  Neuro - m/s grossly intact, cn grossly intact  KULWINDER to right chest wall BALTAZAR drains serosang      I/O:      Intake/Output Summary (Last 24 hours) at 1/28/2024 0744  Last data filed at 1/28/2024 0719  Gross per 24 hour   Intake 960 ml   Output 1155 ml   Net -195 ml       Lab Results:  01/28/24 labs pending  Recent Labs     01/26/24  0630 01/26/24  0832 01/27/24  0600 01/28/24  0614   WBC  --    < > 6.39 5.76   HGB  --    < > 8.6* 8.1*   PLT  --    < > 332 364   SODIUM 136  --  132* 135   K 4.4  --  3.7 3.8     --  99 104   CO2 21  --  27 26   BUN 20  --  17 17   CREATININE 0.36*  --  0.41* 0.37*   GLUC 76  --  79 84   CALCIUM 7.7*  --  8.5 8.7   MG 2.1  --   --   --    PHOS 3.3  --   --   --     < > = values in this interval not displayed.

## 2024-01-29 ENCOUNTER — APPOINTMENT (INPATIENT)
Dept: RADIOLOGY | Facility: HOSPITAL | Age: 64
DRG: 629 | End: 2024-01-29
Payer: MEDICARE

## 2024-01-29 LAB
BACTERIA TISS AEROBE CULT: ABNORMAL
FUNGUS SPEC CULT: NORMAL
FUNGUS SPEC CULT: NORMAL
GLUCOSE SERPL-MCNC: 102 MG/DL (ref 65–140)
GLUCOSE SERPL-MCNC: 117 MG/DL (ref 65–140)
GLUCOSE SERPL-MCNC: 133 MG/DL (ref 65–140)
GLUCOSE SERPL-MCNC: 97 MG/DL (ref 65–140)
GRAM STN SPEC: ABNORMAL
GRAM STN SPEC: ABNORMAL
MRSA NOSE QL CULT: ABNORMAL
MRSA NOSE QL CULT: ABNORMAL

## 2024-01-29 PROCEDURE — 82948 REAGENT STRIP/BLOOD GLUCOSE: CPT

## 2024-01-29 PROCEDURE — 71045 X-RAY EXAM CHEST 1 VIEW: CPT

## 2024-01-29 PROCEDURE — 99232 SBSQ HOSP IP/OBS MODERATE 35: CPT | Performed by: INTERNAL MEDICINE

## 2024-01-29 PROCEDURE — C1751 CATH, INF, PER/CENT/MIDLINE: HCPCS

## 2024-01-29 PROCEDURE — 99024 POSTOP FOLLOW-UP VISIT: CPT | Performed by: THORACIC SURGERY (CARDIOTHORACIC VASCULAR SURGERY)

## 2024-01-29 PROCEDURE — 36569 INSJ PICC 5 YR+ W/O IMAGING: CPT

## 2024-01-29 RX ADMIN — ASPIRIN 81 MG: 81 TABLET, COATED ORAL at 09:28

## 2024-01-29 RX ADMIN — HEPARIN SODIUM 5000 UNITS: 5000 INJECTION INTRAVENOUS; SUBCUTANEOUS at 14:21

## 2024-01-29 RX ADMIN — VANCOMYCIN HYDROCHLORIDE 1250 MG: 10 INJECTION, POWDER, LYOPHILIZED, FOR SOLUTION INTRAVENOUS at 21:05

## 2024-01-29 RX ADMIN — OXYCODONE HYDROCHLORIDE 10 MG: 10 TABLET ORAL at 14:23

## 2024-01-29 RX ADMIN — HEPARIN SODIUM 5000 UNITS: 5000 INJECTION INTRAVENOUS; SUBCUTANEOUS at 05:20

## 2024-01-29 RX ADMIN — HYDROMORPHONE HYDROCHLORIDE 0.5 MG: 1 INJECTION, SOLUTION INTRAMUSCULAR; INTRAVENOUS; SUBCUTANEOUS at 15:46

## 2024-01-29 RX ADMIN — OXYCODONE HYDROCHLORIDE 10 MG: 10 TABLET ORAL at 21:00

## 2024-01-29 RX ADMIN — VANCOMYCIN HYDROCHLORIDE 1750 MG: 5 INJECTION, POWDER, LYOPHILIZED, FOR SOLUTION INTRAVENOUS at 09:25

## 2024-01-29 RX ADMIN — HEPARIN SODIUM 5000 UNITS: 5000 INJECTION INTRAVENOUS; SUBCUTANEOUS at 21:00

## 2024-01-29 RX ADMIN — OXYCODONE HYDROCHLORIDE 10 MG: 10 TABLET ORAL at 06:13

## 2024-01-29 RX ADMIN — Medication 250 MG: at 09:28

## 2024-01-29 RX ADMIN — CEFAZOLIN SODIUM 2000 MG: 2 SOLUTION INTRAVENOUS at 05:20

## 2024-01-29 RX ADMIN — Medication 250 MG: at 17:00

## 2024-01-29 RX ADMIN — HYDROMORPHONE HYDROCHLORIDE 0.5 MG: 1 INJECTION, SOLUTION INTRAMUSCULAR; INTRAVENOUS; SUBCUTANEOUS at 07:47

## 2024-01-29 RX ADMIN — HYDROMORPHONE HYDROCHLORIDE 0.5 MG: 1 INJECTION, SOLUTION INTRAMUSCULAR; INTRAVENOUS; SUBCUTANEOUS at 22:24

## 2024-01-29 RX ADMIN — PANTOPRAZOLE SODIUM 40 MG: 40 TABLET, DELAYED RELEASE ORAL at 05:20

## 2024-01-29 NOTE — PROCEDURES
Insert Complex Venous Access Line    Date/Time: 1/29/2024 10:19 AM    Performed by: Heather Cesar RN  Authorized by: Linda Rdz PA-C    Patient location:  Bedside  Other Assisting Provider: Yes (comment) (Sigrid Draper, [facility])    Consent:     Consent obtained:  Written (provider obtained)    Consent given by:  Patient    Risks discussed:  Arterial puncture, bleeding, infection, incorrect placement, nerve damage and pneumothorax    Alternatives discussed:  No treatment, delayed treatment and alternative treatment  Universal protocol:     Procedure explained and questions answered to patient or proxy's satisfaction: yes      Relevant documents present and verified: yes      Test results available and properly labeled: yes      Radiology Images displayed and confirmed.  If images not available, report reviewed: yes      Required blood products, implants, devices, and special equipment available: yes      Site/side marked: yes      Immediately prior to procedure, a time out was called: yes      Patient identity confirmed:  Verbally with patient, arm band, provided demographic data and hospital-assigned identification number  Pre-procedure details:     Hand hygiene: Hand hygiene performed prior to insertion      Sterile barrier technique: All elements of maximal sterile technique followed      Skin preparation:  ChloraPrep    Skin preparation agent: Skin preparation agent completely dried prior to procedure    Indications:     PICC line indications: long term antibiotics    Sedation:     Sedation type: None.  Anesthesia (see MAR for exact dosages):     Anesthesia method:  Local infiltration    Local anesthetic:  Lidocaine 1% w/o epi (2 mL administered)  Procedure details:     Location:  Basilic    Vessel type: vein      Laterality:  Left    Site selection rationale:  Largest, most patent vein    Approach: percutaneous technique used      Patient position:  Trendelenburg (25 degrees)    Procedural supplies:   Single lumen    Catheter size:  4 Fr    Landmarks identified: yes      Ultrasound guidance: yes      Ultrasound image availability:  Not saved    Sterile ultrasound techniques: Sterile gel and sterile probe covers were used      Number of attempts:  1    Successful placement: yes      Vessel of catheter tip end:  Chest Xray needed to confirm placement (Pending CXR results. Do not use PICC until CXR results are read)    Total catheter length (cm):  43    Catheter out on skin (cm):  1    Max flow rate:  999 mL/hr    Arm circumference:  31  Post-procedure details:     Post-procedure:  Dressing applied and securement device placed    Assessment:  Blood return through all ports and free fluid flow    Post-procedure complications: none      Patient tolerance of procedure:  Tolerated well, no immediate complications    Observer: Yes

## 2024-01-29 NOTE — PROGRESS NOTES
Progress Note - Infectious Disease   Rikki Arguello 64 y.o. male MRN: [id number]  Unit/Bed#: [facility] 402-01 Encounter: [id number]      Impression/Recommendations:  Chronic osteomyelitis of the right 6 through 8 ribs with pathologic fracture.  Etiology of osteomyelitis is chronically nonhealing right chest wall decubitus ulcer.  Patient was started on cefazolin for possible cellulitis.  Patient is status post I&D with partial resection of 8th rib and latissimus muscle flap and wound closure.  Although wound culture grew GBS, operative culture grew MRSA.  Patient will need long-term IV antibiotic.  Change antibiotic to IV vancomycin.  Treat x 6 weeks IV antibiotic, through 3/11.     Possible chest wall cellulitis, with patient started on antibiotic preop.  Antibiotic plan as in above.  Monitor temperature/WBC.     Chronic and progressing right chest wall decubitus ulcer, likely secondary to lack of padding of the wheelchair.    Continue local wound care for now.  Aggressive offloading of right chest wall.     4. Longstanding paraplegia, wheelchair-bound, with multiple prior decubitus ulcers, including right lower leg resulting in right AKA.     5. DM, reasonably well-controlled.  This contributes to poor wound healing and infection above.  Management per primary service.     Discussed with patient in detail regarding the above plan.  Discussed with Linda Rdz PA-C from thoracic surgery service regarding antibiotic change above.  She is agreeable.     Antibiotics:  Cefazolin  POD # 4     Subjective:  Patient with stable mild pain in right chest wall.  Temperature stays down.  No chills.  He is tolerating antibiotic well.  No nausea, vomiting or diarrhea.      Objective:  Vitals:  Temp:  [98.5 °F (36.9 °C)-99 °F (37.2 °C)] 98.7 °F (37.1 °C)  HR:  [71-88] 84  Resp:  [19] 19  BP: (111-143)/(68-77) 120/74  SpO2:  [95 %-98 %] 95 %  Temp (24hrs), Av.8 °F (37.1 °C), Min:98.5 °F (36.9 °C), Max:99 °F (37.2 °C)  Current:  Temperature: 98.7 °F (37.1 °C)    Physical Exam:     General: Awake, alert, cooperative, no distress.   Neck:  Supple. No mass.  No lymphadenopathy.   Chest:  Right chest wall wound with dressing in place.  Dressing is dry.  No erythema/warm beyond dressing.  Drain serous.  Mild and improved tenderness.   Lungs: Expansion symmetric, no rales, no wheezing, respirations unlabored.   Heart:  Regular rate and rhythm, S1 and S2 normal, no murmur.   Abdomen: Soft, nondistended, non-tender, bowel sounds active all four quadrants, no masses, no organomegaly.   Extremities: No edema. No erythema/warmth. No ulcer. Nontender to palpation.   Skin:  No rash.   Neuro: Moves all extremities.     Invasive Devices       Peripherally Inserted Central Catheter Line  Duration             PICC Line 01/29/24 Left Basilic <1 day              Peripheral Intravenous Line  Duration             Peripheral IV 01/28/24 Proximal;Right;Ventral (anterior) Forearm <1 day              Drain  Duration             Colostomy LLQ 11 days    Suprapubic Catheter 11 days    Closed/Suction Drain Lateral RUQ Bulb 15 Fr. 4 days    Closed/Suction Drain Lateral RUQ Bulb 15 Fr. 4 days    Closed/Suction Drain Lateral RUQ Bulb 15 Fr. 4 days                    Labs studies:   I have personally reviewed pertinent labs.  Results from last 7 days   Lab Units 01/28/24  0614 01/27/24  0600 01/26/24  0630   POTASSIUM mmol/L 3.8 3.7 4.4   CHLORIDE mmol/L 104 99 107   CO2 mmol/L 26 27 21   BUN mg/dL 17 17 20   CREATININE mg/dL 0.37* 0.41* 0.36*   EGFR ml/min/1.73sq m 130 125 132   CALCIUM mg/dL 8.7 8.5 7.7*     Results from last 7 days   Lab Units 01/28/24  0614 01/27/24  0600 01/26/24  0832   WBC Thousand/uL 5.76 6.39 8.83   HEMOGLOBIN g/dL 8.1* 8.6* 8.4*   PLATELETS Thousands/uL 364 332 335     Results from last 7 days   Lab Units 01/27/24  0824 01/25/24  1020 01/25/24  1019   GRAM STAIN RESULT   --  1+ Polys  No bacteria seen Rare Polys  No organisms seen   MRSA  CULTURE ONLY  Methicillin Resistant Staphylococcus aureus isolated*  This patient requires contact isolation precautions per [place] law. Contact precautions are not required in [place] for nasal surveillance cultures.  --   --        Imaging Studies:   I have personally reviewed pertinent imaging study reports and images in PACS.    EKG, Pathology, and Other Studies:   I have personally reviewed pertinent reports.

## 2024-01-29 NOTE — PROGRESS NOTES
Rikki Arguello is a 64 y.o. male who is currently ordered Vancomycin IV with management by the Pharmacy Consult service.  Relevant clinical data and objective / subjective history reviewed.  Vancomycin Assessment:  Indication and Goal AUC/Trough: Bone/joint infection (goal -600, trough >10), MRSA, -600, trough >10  Clinical Status: worsening  Micro:     Renal Function:  SCr: 0.37 mg/dL  CrCl: 190 mL/min  Renal replacement: Not on dialysis  Days of Therapy: 1  Current Dose: vancomycin 1750 mg IV x once  Vancomycin Plan:  New Dosing: vanncomycin 1250 mg IV q 12 h  Estimated AUC: 463 mcg/hr/mL  Estimated Trough: 12.5 mcg/mL  Next Level: 01/31/2024@ 0600  Renal Function Monitoring: Daily BMP and UOP  Pharmacy will continue to follow closely for s/sx of nephrotoxicity, infusion reactions and appropriateness of therapy.  BMP and CBC will be ordered per protocol. We will continue to follow the patient’s culture results and clinical progress daily.    Ruma Hartman, Pharmacist

## 2024-01-29 NOTE — PROGRESS NOTES
"Progress Note - Thoracic Surgery   Rikki Farooqcat 64 y.o. male MRN: [id number]  Unit/Bed#: [facility] 402-01 Encounter: [id number]    Assessment:  Chronic chest wall wound with rib osteomyelitis due to MRSA  Postoperative day 4 status post resection of eighth rib osteomyelitis and latissimus muscle flap closure  Longstanding paraplegia  Decubitus ulcer  History of right leg amputation, stripped colostomy    Plan:  Continue Aubrey drains to suction, plastic surgery managing  Continue wound VAC per plastic surgery  IV antibiotics switched to vancomycin  Aggressive pulmonary toilet  Nutrition    Subjective/Objective     Chief Complaint: None    Subjective: Overall, no major complaints.  Had PICC line placed today.  Eating well.      Objective:     Vitals: Blood pressure 120/74, pulse 84, temperature 98.7 °F (37.1 °C), temperature source Oral, resp. rate (!) 25, height 5' 7\" (1.702 m), weight 65.8 kg (145 lb), SpO2 95%.,Body mass index is 22.71 kg/m².    I/O         01/27 0701 01/28 0700 01/28 0701  01/29 0700 01/29 0701  01/30 0700    P.O. 480 1200 118    NG/GT 0 0 0    IV Piggyback  50 500    Total Intake(mL/kg) 480 (7.3) 1250 (19) 618 (9.4)    Urine (mL/kg/hr) 1500 (0.9) 1900 (1.2) 750 (1.4)    Drains 180 130 0    Stool 0 0 0    Total Output 1680 2030 750    Net -1200 -780 -132                   Physical Exam: Max 99, pulse 84, 120/74, 95% saturated on room air  Right chest wall incision covered with incisional VAC, otherwise clean dry and intact  Aubrey drains with 65, 40, and 25 mL serosanguineous drainage        "

## 2024-01-29 NOTE — PLAN OF CARE
Problem: Potential for Falls  Goal: Patient will remain free of falls  Description: INTERVENTIONS:  - Educate patient/family on patient safety including physical limitations  - Instruct patient to call for assistance with activity   - Consult OT/PT to assist with strengthening/mobility   - Keep Call bell within reach  - Keep bed low and locked with side rails adjusted as appropriate  - Keep care items and personal belongings within reach  - Initiate and maintain comfort rounds  - Make Fall Risk Sign visible to staff  - Offer Toileting every  Hours, in advance of need  - Initiate/Maintain alarm  - Obtain necessary fall risk management equipment:   - Apply yellow socks and bracelet for high fall risk patients  - Consider moving patient to room near nurses station  Outcome: Progressing     Problem: PAIN - ADULT  Goal: Verbalizes/displays adequate comfort level or baseline comfort level  Description: Interventions:  - Encourage patient to monitor pain and request assistance  - Assess pain using appropriate pain scale  - Administer analgesics based on type and severity of pain and evaluate response  - Implement non-pharmacological measures as appropriate and evaluate response  - Consider cultural and social influences on pain and pain management  - Notify physician/advanced practitioner if interventions unsuccessful or patient reports new pain  Outcome: Progressing     Problem: INFECTION - ADULT  Goal: Absence or prevention of progression during hospitalization  Description: INTERVENTIONS:  - Assess and monitor for signs and symptoms of infection  - Monitor lab/diagnostic results  - Monitor all insertion sites, i.e. indwelling lines, tubes, and drains  - Monitor endotracheal if appropriate and nasal secretions for changes in amount and color  - [place] appropriate cooling/warming therapies per order  - Administer medications as ordered  - Instruct and encourage patient and family to use good hand hygiene technique  -  Identify and instruct in appropriate isolation precautions for identified infection/condition  Outcome: Progressing  Goal: Absence of fever/infection during neutropenic period  Description: INTERVENTIONS:  - Monitor WBC    Outcome: Progressing     Problem: SAFETY ADULT  Goal: Patient will remain free of falls  Description: INTERVENTIONS:  - Educate patient/family on patient safety including physical limitations  - Instruct patient to call for assistance with activity   - Consult OT/PT to assist with strengthening/mobility   - Keep Call bell within reach  - Keep bed low and locked with side rails adjusted as appropriate  - Keep care items and personal belongings within reach  - Initiate and maintain comfort rounds  - Make Fall Risk Sign visible to staff  - Offer Toileting every  Hours, in advance of need  - Initiate/Maintain alarm  - Obtain necessary fall risk management equipment:   - Apply yellow socks and bracelet for high fall risk patients  - Consider moving patient to room near nurses station  Outcome: Progressing  Goal: Maintain or return to baseline ADL function  Description: INTERVENTIONS:  -  Assess patient's ability to carry out ADLs; assess patient's baseline for ADL function and identify physical deficits which impact ability to perform ADLs (bathing, care of mouth/teeth, toileting, grooming, dressing, etc.)  - Assess/evaluate cause of self-care deficits   - Assess range of motion  - Assess patient's mobility; develop plan if impaired  - Assess patient's need for assistive devices and provide as appropriate  - Encourage maximum independence but intervene and supervise when necessary  - Involve family in performance of ADLs  - Assess for home care needs following discharge   - Consider OT consult to assist with ADL evaluation and planning for discharge  - Provide patient education as appropriate  Outcome: Progressing  Goal: Maintains/Returns to pre admission functional level  Description:  INTERVENTIONS:  - Perform AM-PAC 6 Click Basic Mobility/ Daily Activity assessment daily.  - Set and communicate daily mobility goal to care team and patient/family/caregiver.   - Collaborate with rehabilitation services on mobility goals if consulted  - Perform Range of Motion  times a day.  - Reposition patient every  hours.  - Dangle patient  times a day  - Stand patient  times a day  - Ambulate patient  times a day  - Out of bed to chair  times a day   - Out of bed for meals  times a day  - Out of bed for toileting  - Record patient progress and toleration of activity level   Outcome: Progressing     Problem: DISCHARGE PLANNING  Goal: Discharge to home or other facility with appropriate resources  Description: INTERVENTIONS:  - Identify barriers to discharge w/patient and caregiver  - Arrange for needed discharge resources and transportation as appropriate  - Identify discharge learning needs (meds, wound care, etc.)  - Arrange for interpretive services to assist at discharge as needed  - Refer to Case Management Department for coordinating discharge planning if the patient needs post-hospital services based on physician/advanced practitioner order or complex needs related to functional status, cognitive ability, or social support system  Outcome: Progressing     Problem: Knowledge Deficit  Goal: Patient/family/caregiver demonstrates understanding of disease process, treatment plan, medications, and discharge instructions  Description: Complete learning assessment and assess knowledge base.  Interventions:  - Provide teaching at level of understanding  - Provide teaching via preferred learning methods  Outcome: Progressing     Problem: Prexisting or High Potential for Compromised Skin Integrity  Goal: Skin integrity is maintained or improved  Description: INTERVENTIONS:  - Identify patients at risk for skin breakdown  - Assess and monitor skin integrity  - Assess and monitor nutrition and hydration status  -  Monitor labs   - Assess for incontinence   - Turn and reposition patient  - Assist with mobility/ambulation  - Relieve pressure over bony prominences  - Avoid friction and shearing  - Provide appropriate hygiene as needed including keeping skin clean and dry  - Evaluate need for skin moisturizer/barrier cream  - Collaborate with interdisciplinary team   - Patient/family teaching  - Consider wound care consult   Outcome: Progressing     Problem: Nutrition/Hydration-ADULT  Goal: Nutrient/Hydration intake appropriate for improving, restoring or maintaining nutritional needs  Description: Monitor and assess patient's nutrition/hydration status for malnutrition. Collaborate with interdisciplinary team and initiate plan and interventions as ordered.  Monitor patient's weight and dietary intake as ordered or per policy. Utilize nutrition screening tool and intervene as necessary. Determine patient's food preferences and provide high-protein, high-caloric foods as appropriate.     INTERVENTIONS:  - Monitor oral intake, urinary output, labs, and treatment plans  - Assess nutrition and hydration status and recommend course of action  - Evaluate amount of meals eaten  - Assist patient with eating if necessary   - Allow adequate time for meals  - Recommend/ encourage appropriate diets, oral nutritional supplements, and vitamin/mineral supplements  - Order, calculate, and assess calorie counts as needed  - Recommend, monitor, and adjust tube feedings and TPN/PPN based on assessed needs  - Assess need for intravenous fluids  - Provide specific nutrition/hydration education as appropriate  - Include patient/family/caregiver in decisions related to nutrition  Outcome: Progressing

## 2024-01-30 ENCOUNTER — APPOINTMENT (INPATIENT)
Dept: RADIOLOGY | Facility: HOSPITAL | Age: 64
DRG: 629 | End: 2024-01-30
Payer: MEDICARE

## 2024-01-30 LAB
GLUCOSE SERPL-MCNC: 118 MG/DL (ref 65–140)
GLUCOSE SERPL-MCNC: 84 MG/DL (ref 65–140)
GLUCOSE SERPL-MCNC: 93 MG/DL (ref 65–140)
GLUCOSE SERPL-MCNC: 95 MG/DL (ref 65–140)

## 2024-01-30 PROCEDURE — 88311 DECALCIFY TISSUE: CPT | Performed by: PATHOLOGY

## 2024-01-30 PROCEDURE — 88307 TISSUE EXAM BY PATHOLOGIST: CPT | Performed by: PATHOLOGY

## 2024-01-30 PROCEDURE — 99024 POSTOP FOLLOW-UP VISIT: CPT | Performed by: THORACIC SURGERY (CARDIOTHORACIC VASCULAR SURGERY)

## 2024-01-30 PROCEDURE — 82948 REAGENT STRIP/BLOOD GLUCOSE: CPT

## 2024-01-30 PROCEDURE — 88304 TISSUE EXAM BY PATHOLOGIST: CPT | Performed by: PATHOLOGY

## 2024-01-30 PROCEDURE — 99232 SBSQ HOSP IP/OBS MODERATE 35: CPT | Performed by: INTERNAL MEDICINE

## 2024-01-30 PROCEDURE — 71045 X-RAY EXAM CHEST 1 VIEW: CPT

## 2024-01-30 PROCEDURE — 99024 POSTOP FOLLOW-UP VISIT: CPT | Performed by: PLASTIC SURGERY

## 2024-01-30 RX ADMIN — HYDROMORPHONE HYDROCHLORIDE 0.5 MG: 1 INJECTION, SOLUTION INTRAMUSCULAR; INTRAVENOUS; SUBCUTANEOUS at 21:40

## 2024-01-30 RX ADMIN — HEPARIN SODIUM 5000 UNITS: 5000 INJECTION INTRAVENOUS; SUBCUTANEOUS at 15:11

## 2024-01-30 RX ADMIN — HEPARIN SODIUM 5000 UNITS: 5000 INJECTION INTRAVENOUS; SUBCUTANEOUS at 21:43

## 2024-01-30 RX ADMIN — OXYCODONE HYDROCHLORIDE 10 MG: 10 TABLET ORAL at 15:10

## 2024-01-30 RX ADMIN — OXYCODONE HYDROCHLORIDE 10 MG: 10 TABLET ORAL at 08:46

## 2024-01-30 RX ADMIN — ASPIRIN 81 MG: 81 TABLET, COATED ORAL at 08:46

## 2024-01-30 RX ADMIN — Medication 250 MG: at 17:14

## 2024-01-30 RX ADMIN — HYDROMORPHONE HYDROCHLORIDE 0.5 MG: 1 INJECTION, SOLUTION INTRAMUSCULAR; INTRAVENOUS; SUBCUTANEOUS at 09:48

## 2024-01-30 RX ADMIN — HEPARIN SODIUM 5000 UNITS: 5000 INJECTION INTRAVENOUS; SUBCUTANEOUS at 06:07

## 2024-01-30 RX ADMIN — Medication 250 MG: at 08:46

## 2024-01-30 RX ADMIN — HYDROMORPHONE HYDROCHLORIDE 0.5 MG: 1 INJECTION, SOLUTION INTRAMUSCULAR; INTRAVENOUS; SUBCUTANEOUS at 17:14

## 2024-01-30 RX ADMIN — VANCOMYCIN HYDROCHLORIDE 1250 MG: 10 INJECTION, POWDER, LYOPHILIZED, FOR SOLUTION INTRAVENOUS at 08:48

## 2024-01-30 RX ADMIN — PANTOPRAZOLE SODIUM 40 MG: 40 TABLET, DELAYED RELEASE ORAL at 06:07

## 2024-01-30 RX ADMIN — OXYCODONE HYDROCHLORIDE 10 MG: 10 TABLET ORAL at 20:28

## 2024-01-30 RX ADMIN — VANCOMYCIN HYDROCHLORIDE 1250 MG: 10 INJECTION, POWDER, LYOPHILIZED, FOR SOLUTION INTRAVENOUS at 20:16

## 2024-01-30 NOTE — CASE MANAGEMENT
Case Management Discharge Planning Note    Patient name Rikki Arguello  [id number]  : 1960 Date 2024       Current Admission Date: 2024  Current Admission Diagnosis:Stage IV pressure ulcer of right lower back (HCC)   Patient Active Problem List    Diagnosis Date Noted    Subacute osteomyelitis, other site ([facility]) 2023    Foul smelling urine 2023    Pressure ulcer of left buttock, stage 4 ([facility]) 01/10/2023    Heme positive stool 2022    Open wound of buttock, left, initial encounter 2022    Pressure injury of contiguous region involving back and left buttock, stage 4 ([facility]) 2021    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 ([facility]) 2021    Stage IV pressure ulcer of right lower back ([facility]) 2021    Type 2 diabetes mellitus without complication, without long-term current use of insulin ([facility]) 2021    Renal cyst 2019    Other male erectile dysfunction 2019    Prostate cancer screening 2019    Hypokalemia 10/29/2019    SBO (small bowel obstruction) ([facility]) 10/28/2019    Sepsis ([facility]) 10/28/2019    Stage II pressure ulcer of buttock ([facility]) 2019    Left perineal ischial pressure ulcer, stage IV ([facility]) 2019    Hyperkalemia 2019    History of Clostridium difficile colitis 2019    Neurogenic bladder 2019    Sepsis with hypotension  2019    Osteomyelitis of pelvic region ([facility]) 2019    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    Colostomy in place ([facility]) 2019    Colon cancer screening 2019    Dyspepsia 2019    Epigastric pain 2019    Continuous opioid dependence ([facility])     Decubitus ulcer of ischium, left, stage IV ([facility]) 2018    Diarrhea 2018    Amputated right leg ([facility]) 2018    Anxiety 2018    GERD (gastroesophageal reflux disease)     History of osteomyelitis     Cyst of joint of shoulder 10/20/2016    Anemia 10/20/2016    Paraplegic spinal  paralysis (HCC) 10/20/2016    Sinus tachycardia 10/20/2016    Stage IV pressure ulcer of sacral region (HCC) 10/15/2016    Stage IV pressure ulcer of left heel (HCC) 10/15/2016    Diabetes mellitus type II, controlled (HCC) 03/07/2014    Benign essential hypertension 07/31/2013      LOS (days): 13  Geometric Mean LOS (GMLOS) (days): 3.4  Days to GMLOS:-9.4     OBJECTIVE:  Risk of Unplanned Readmission Score: 14.48         Current admission status: Inpatient   Preferred Pharmacy:   [id number] [place], [place] - [address]  [address]  [facility] [address]  Phone: [phone] Fax: [phone]    [id number] [place] [facility] [address]  [address] [facility] [address]  Phone: [phone] Fax: [phone]    Primary Care Provider: Lexy Bo MD    Primary Insurance: MEDICARE  Secondary Insurance: [facility]    DISCHARGE DETAILS:                                                                                                 Additional Comments: Spoke with Dr. Ana Maria ellis. pt's wheelchair, has no padding on right side contributing to his wound. CM spoke with Dr. DePadua in [facility] and she referred to [facility] specialist for equipment, Alexis Ashraf.  DENIS sent an email to him at [email or web address]. Awaiting response, Dr. Ana Maria montilla.

## 2024-01-30 NOTE — PROGRESS NOTES
"Progress Note - Plastic Surgery   Rikki Farooqcat 64 y.o. male MRN: [id number]  Unit/Bed#: [facility] 402-01 Encounter: [id number]    Assessment:  POD#5 S/P excision of right chest wall wound bursa with rib resection and latissimus flap closure. Doing well overall.     Plan:  Continue drains, continue KULWINDER dressing. Will d/c KULWINDER dressing on 2/1.  Needs wheel chair altered prior to d/c home.  Optimize nutrition, DM management  Anticipate d/c once wheel chair adjusted.    Subjective/Objective     Subjective: No complaints, no new events ON. On vanco, PICC line placed yesterday.    Objective:     Blood pressure 142/78, pulse 73, temperature 98.3 °F (36.8 °C), resp. rate (!) 25, height 5' 7\" (1.702 m), weight 65.8 kg (145 lb), SpO2 98%.,Body mass index is 22.71 kg/m².      Intake/Output Summary (Last 24 hours) at 1/30/2024 1747  Last data filed at 1/30/2024 1215  Gross per 24 hour   Intake 1096 ml   Output 1445 ml   Net -349 ml       Invasive Devices       Peripherally Inserted Central Catheter Line  Duration             PICC Line 01/29/24 Left Basilic 1 day              Peripheral Intravenous Line  Duration             Peripheral IV 01/28/24 Proximal;Right;Ventral (anterior) Forearm 1 day              Drain  Duration             Colostomy LLQ 12 days    Suprapubic Catheter 12 days    Closed/Suction Drain Lateral RUQ Bulb 15 Fr. 5 days    Closed/Suction Drain Lateral RUQ Bulb 15 Fr. 5 days    Closed/Suction Drain Lateral RUQ Bulb 15 Fr. 5 days                    Physical Exam:   Gen: AAOx3, NAD  Right lateral chest/flank with KULWINDER in place, functioning, drains x3 with serous OP. Tissue soft, supple, no tension. No erythema. No signs of seroma/hematoma.    Lab, Imaging and other studies:CBC: No results found for: \"WBC\", \"HGB\", \"HCT\", \"MCV\", \"PLT\", \"ADJUSTEDWBC\", \"RBC\", \"MCH\", \"MCHC\", \"RDW\", \"MPV\", \"NRBC\"  VTE Pharmacologic Prophylaxis: Heparin  VTE Mechanical Prophylaxis: sequential compression device  "

## 2024-01-30 NOTE — CASE MANAGEMENT
Case Management Discharge Planning Note    Patient name Rikki Arguello  [facility] [id number]  : 1960 Date 2024       Current Admission Date: 2024  Current Admission Diagnosis:Stage IV pressure ulcer of right lower back (HCC)   Patient Active Problem List    Diagnosis Date Noted    Subacute osteomyelitis, other site ([facility]) 2023    Foul smelling urine 2023    Pressure ulcer of left buttock, stage 4 ([facility]) 01/10/2023    Heme positive stool 2022    Open wound of buttock, left, initial encounter 2022    Pressure injury of contiguous region involving back and left buttock, stage 4 ([facility]) 2021    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 ([facility]) 2021    Stage IV pressure ulcer of right lower back ([facility]) 2021    Type 2 diabetes mellitus without complication, without long-term current use of insulin ([facility]) 2021    Renal cyst 2019    Other male erectile dysfunction 2019    Prostate cancer screening 2019    Hypokalemia 10/29/2019    SBO (small bowel obstruction) ([facility]) 10/28/2019    Sepsis ([facility]) 10/28/2019    Stage II pressure ulcer of buttock ([facility]) 2019    Left perineal ischial pressure ulcer, stage IV ([facility]) 2019    Hyperkalemia 2019    History of Clostridium difficile colitis 2019    Neurogenic bladder 2019    Sepsis with hypotension  2019    Osteomyelitis of pelvic region ([facility]) 2019    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    Colostomy in place ([facility]) 2019    Colon cancer screening 2019    Dyspepsia 2019    Epigastric pain 2019    Continuous opioid dependence ([facility])     Decubitus ulcer of ischium, left, stage IV ([facility]) 2018    Diarrhea 2018    Amputated right leg ([facility]) 2018    Anxiety 2018    GERD (gastroesophageal reflux disease)     History of osteomyelitis     Cyst of joint of shoulder 10/20/2016    Anemia 10/20/2016    Paraplegic spinal  paralysis (HCC) 10/20/2016    Sinus tachycardia 10/20/2016    Stage IV pressure ulcer of sacral region (HCC) 10/15/2016    Stage IV pressure ulcer of left heel (HCC) 10/15/2016    Diabetes mellitus type II, controlled (HCC) 03/07/2014    Benign essential hypertension 07/31/2013      LOS (days): 13  Geometric Mean LOS (GMLOS) (days): 3.4  Days to GMLOS:-9.5     OBJECTIVE:  Risk of Unplanned Readmission Score: 14.48         Current admission status: Inpatient   Preferred Pharmacy:   [id number] [place], [place] - [address]   [address] [facility] [address]  Phone: [phone] Fax: [phone]    [id number] [place] [facility] [address]  [address] [facility] [address]  Phone: [phone] Fax: [phone]    Primary Care Provider: Lexy Bo MD    Primary Insurance: MEDICARE  Secondary Insurance: [facility]    DISCHARGE DETAILS:                                                                                                 Additional Comments:  will see pt here on 2/5/24. Dr. Rodgers informed.

## 2024-01-30 NOTE — PLAN OF CARE
Problem: Potential for Falls  Goal: Patient will remain free of falls  Description: INTERVENTIONS:  - Educate patient/family on patient safety including physical limitations  - Instruct patient to call for assistance with activity   - Consult OT/PT to assist with strengthening/mobility   - Keep Call bell within reach  - Keep bed low and locked with side rails adjusted as appropriate  - Keep care items and personal belongings within reach  - Initiate and maintain comfort rounds  - Make Fall Risk Sign visible to staff  - Offer Toileting every 4 Hours, in advance of need  - Initiate/Maintain bed alarm  - Obtain necessary fall risk management equipment: yes  - Apply yellow socks and bracelet for high fall risk patients  - Consider moving patient to room near nurses station  Outcome: Progressing

## 2024-01-30 NOTE — PROGRESS NOTES
Rikki Arguello is a 64 y.o. male who is currently ordered Vancomycin IV with management by the Pharmacy Consult service.  Relevant clinical data and objective / subjective history reviewed.  Vancomycin Assessment:  Indication and Goal AUC/Trough: Bone/joint infection (goal -600, trough >10), MRSA, -600, trough >10  Clinical Status: stable  Micro:     Renal Function:  SCr: 0.37 mg/dL  CrCl: 190 mL/min  Renal replacement: Not on dialysis  Days of Therapy: 2  Current Dose: 1250 mg IV q12h  Vancomycin Plan:  New Dosing: vanncomycin 1250 mg IV q 12 h  Estimated AUC: 464 mcg*hr/mL  Estimated Trough: 12.5 mcg/mL  Next Level: 1/31 AM labs   Renal Function Monitoring: Daily BMP and UOP  Pharmacy will continue to follow closely for s/sx of nephrotoxicity, infusion reactions and appropriateness of therapy.  BMP and CBC will be ordered per protocol. We will continue to follow the patient’s culture results and clinical progress daily.    Santy Arroyo, Pharmacist

## 2024-01-30 NOTE — PROGRESS NOTES
"Thoracic Surgery  Progress Note   Rikki Farooqcat 64 y.o. male MRN: [id number]  Unit/Bed#: [facility] 402-01 Encounter: [id number]    Assessment:  64-year-old male with chronic chest wound and rib osteomyelitis.    Status post  resection of eighth rib osteomyelitis, latissimus flap closure.    History of paraplegia, colostomy, right leg amputation, decubitus ulcer.    PICC placed  for long term antibiotics.    AVSS on room air  UOP: 1800 cc  Drains: 105 cc serosanguineous (25, 60, 20)      A.m. labs pending    Plan:  -Continue Aubrey drains to suction, plastic surgery managing  -Continue wound VAC per plastic surgery  -Continue IV vancomycin  -ID: Treat x 6 weeks IV antibiotic, through 3/11.   -Aggressive pulmonary toilet  -Nutrition  -Pain/ nausea control PRN  -OOB/ ambulation  -Incentive Spirometry  -Disposition: discharge planning    Subjective/Objective     Subjective:   Patient seen and examined at bedside, in no acute distress. No acute events overnight.  Patient reports pain near operative site.  No nausea vomiting fevers chills chest pain or shortness of breath.  Patient is able to pull 2000 cc on incentive spirometer.    Objective:   Vitals:Blood pressure 127/74, pulse 79, temperature 98.7 °F (37.1 °C), resp. rate (!) 25, height 5' 7\" (1.702 m), weight 65.8 kg (145 lb), SpO2 96%.  Temp (24hrs), Av.6 °F (37 °C), Min:98.4 °F (36.9 °C), Max:98.7 °F (37.1 °C)      I/O          0701   0700  07 0700    P.O. 1200 236    NG/GT 0 0    IV Piggyback 50 750    Total Intake(mL/kg) 1250 (19) 986 (15)    Urine (mL/kg/hr) 1900 (1.2) 1800 (1.1)    Drains 130 105    Stool 0 0    Total Output 2030    Net -669 -124                  Invasive Devices       Peripherally Inserted Central Catheter Line  Duration             PICC Line 24 Left Basilic <1 day              Peripheral Intravenous Line  Duration             Peripheral IV 24 Proximal;Right;Ventral (anterior) Forearm 1 day      "         Drain  Duration             Colostomy LLQ 12 days    Suprapubic Catheter 12 days    Closed/Suction Drain Lateral RUQ Bulb 15 Fr. 4 days    Closed/Suction Drain Lateral RUQ Bulb 15 Fr. 4 days    Closed/Suction Drain Lateral RUQ Bulb 15 Fr. 4 days                    Physical Exam:  General: No acute distress  Neuro: Awake, Alert and Oriented x 3  HEENT:  Normocephalic, atraumatic, moist mucous membranes  CV: Regular rate and rhythm  Lungs: Normal work of breathing, no increased respiratory effort  Chest: : Soft, nontender, no crepitus or fluctuance, BALTAZAR x3 serosanguineous output    Abdomen: Soft, non-tender, non-distended.   Extremities: No edema, clubbing or cyanosis  Skin: Warm, dry    Lab Results   Component Value Date    WBC 5.76 01/28/2024    HGB 8.1 (L) 01/28/2024    HCT 28.0 (L) 01/28/2024    MCV 79 (L) 01/28/2024     01/28/2024      Lab Results   Component Value Date     02/17/2014    SODIUM 135 01/28/2024    K 3.8 01/28/2024     01/28/2024    CO2 26 01/28/2024    ANIONGAP 8 02/17/2014    AGAP 5 01/28/2024    BUN 17 01/28/2024    CREATININE 0.37 (L) 01/28/2024    GLUC 84 01/28/2024    GLUF 62 (L) 08/29/2023    CALCIUM 8.7 01/28/2024    AST 11 (L) 01/18/2024    ALT 7 01/18/2024    ALKPHOS 77 01/18/2024    PROT 8.0 02/17/2014    TP 8.6 (H) 01/18/2024    BILITOT 0.3 02/17/2014    TBILI 0.39 01/18/2024    EGFR 130 01/28/2024       VTE Prophylaxis: Heparin        Akhil Mills MD  1/30/2024  4:42 AM

## 2024-01-30 NOTE — PROGRESS NOTES
Progress Note - Infectious Disease   Rikki Arguello 64 y.o. male MRN: [id number]  Unit/Bed#: [facility] 402-01 Encounter: [id number]      Impression/Recommendations:  Chronic osteomyelitis of the right 6 through 8 ribs with pathologic fracture.  Etiology of osteomyelitis is chronically nonhealing right chest wall decubitus ulcer.  Patient was started on cefazolin for possible cellulitis.  Patient is status post I&D with partial resection of 8th rib and latissimus muscle flap and wound closure.  Although wound culture grew GBS, operative culture grew MRSA.  Patient will need long-term IV antibiotic.  Continue IV vancomycin.  Treat x 6 weeks IV antibiotic, through 3/11.     Possible chest wall cellulitis, with patient started on antibiotic preop.  Antibiotic plan as in above.  Monitor temperature/WBC.     Chronic and progressing right chest wall decubitus ulcer, likely secondary to lack of padding of the wheelchair.    Continue local wound care for now.  Aggressive offloading of right chest wall.     4. Longstanding paraplegia, wheelchair-bound, with multiple prior decubitus ulcers, including right lower leg resulting in right AKA.     5. DM, reasonably well-controlled.  This contributes to poor wound healing and infection above.  Management per primary service.     Discussed with patient in detail regarding the above plan.     Antibiotics:  Vancomycin # 2  POD # 4     Subjective:  Patient with mild pain in right chest wall.  Temperature stays down.  No chills.  He is tolerating antibiotic well.  No nausea, vomiting or diarrhea.     Objective:  Vitals:  Temp:  [98.4 °F (36.9 °C)-98.7 °F (37.1 °C)] 98.6 °F (37 °C)  HR:  [64-79] 74  BP: (127-145)/(72-77) 145/77  SpO2:  [94 %-98 %] 94 %  Temp (24hrs), Av.6 °F (37 °C), Min:98.4 °F (36.9 °C), Max:98.7 °F (37.1 °C)  Current: Temperature: 98.6 °F (37 °C)    Physical Exam:     General: Awake, alert, cooperative, no distress.   Neck:  Supple. No mass.  No  lymphadenopathy.   Chest:  Wound with dressing in place.  Dressing is dry.  Drain serous.  Mild and improved tenderness.   Lungs: Expansion symmetric, no rales, no wheezing, respirations unlabored.   Heart:  Regular rate and rhythm, S1 and S2 normal, no murmur.   Abdomen: Soft, nondistended, non-tender, bowel sounds active all four quadrants, no masses, no organomegaly.   Extremities: Stable mild edema. No erythema/warmth. No draining ulcer. Nontender to palpation.   Skin:  No rash.   Neuro: Stable paraplegia.     Invasive Devices       Peripherally Inserted Central Catheter Line  Duration             PICC Line 01/29/24 Left Basilic <1 day              Peripheral Intravenous Line  Duration             Peripheral IV 01/28/24 Proximal;Right;Ventral (anterior) Forearm 1 day              Drain  Duration             Colostomy LLQ 12 days    Suprapubic Catheter 12 days    Closed/Suction Drain Lateral RUQ Bulb 15 Fr. 4 days    Closed/Suction Drain Lateral RUQ Bulb 15 Fr. 4 days    Closed/Suction Drain Lateral RUQ Bulb 15 Fr. 4 days                    Labs studies:   I have personally reviewed pertinent labs.  Results from last 7 days   Lab Units 01/28/24  0614 01/27/24  0600 01/26/24  0630   POTASSIUM mmol/L 3.8 3.7 4.4   CHLORIDE mmol/L 104 99 107   CO2 mmol/L 26 27 21   BUN mg/dL 17 17 20   CREATININE mg/dL 0.37* 0.41* 0.36*   EGFR ml/min/1.73sq m 130 125 132   CALCIUM mg/dL 8.7 8.5 7.7*     Results from last 7 days   Lab Units 01/28/24  0614 01/27/24  0600 01/26/24  0832   WBC Thousand/uL 5.76 6.39 8.83   HEMOGLOBIN g/dL 8.1* 8.6* 8.4*   PLATELETS Thousands/uL 364 332 335     Results from last 7 days   Lab Units 01/27/24  0824 01/25/24  1020 01/25/24  1019   GRAM STAIN RESULT   --  1+ Polys  No bacteria seen Rare Polys  No organisms seen   MRSA CULTURE ONLY  Methicillin Resistant Staphylococcus aureus isolated*  This patient requires contact isolation precautions per [place] law. Contact precautions are not  required in [place] for nasal surveillance cultures.  --   --        Imaging Studies:   I have personally reviewed pertinent imaging study reports and images in PACS.    EKG, Pathology, and Other Studies:   I have personally reviewed pertinent reports.

## 2024-01-31 LAB
ANION GAP SERPL CALCULATED.3IONS-SCNC: 6 MMOL/L
BUN SERPL-MCNC: 19 MG/DL (ref 5–25)
CALCIUM SERPL-MCNC: 8.6 MG/DL (ref 8.4–10.2)
CHLORIDE SERPL-SCNC: 103 MMOL/L (ref 96–108)
CO2 SERPL-SCNC: 26 MMOL/L (ref 21–32)
CREAT SERPL-MCNC: 0.35 MG/DL (ref 0.6–1.3)
GFR SERPL CREATININE-BSD FRML MDRD: 133 ML/MIN/1.73SQ M
GLUCOSE SERPL-MCNC: 161 MG/DL (ref 65–140)
GLUCOSE SERPL-MCNC: 72 MG/DL (ref 65–140)
GLUCOSE SERPL-MCNC: 80 MG/DL (ref 65–140)
GLUCOSE SERPL-MCNC: 86 MG/DL (ref 65–140)
GLUCOSE SERPL-MCNC: 92 MG/DL (ref 65–140)
POTASSIUM SERPL-SCNC: 3.7 MMOL/L (ref 3.5–5.3)
SODIUM SERPL-SCNC: 135 MMOL/L (ref 135–147)
VANCOMYCIN SERPL-MCNC: 16.9 UG/ML (ref 10–20)

## 2024-01-31 PROCEDURE — 82948 REAGENT STRIP/BLOOD GLUCOSE: CPT

## 2024-01-31 PROCEDURE — 99024 POSTOP FOLLOW-UP VISIT: CPT | Performed by: THORACIC SURGERY (CARDIOTHORACIC VASCULAR SURGERY)

## 2024-01-31 PROCEDURE — 99232 SBSQ HOSP IP/OBS MODERATE 35: CPT | Performed by: INTERNAL MEDICINE

## 2024-01-31 PROCEDURE — 80048 BASIC METABOLIC PNL TOTAL CA: CPT | Performed by: INTERNAL MEDICINE

## 2024-01-31 PROCEDURE — 80202 ASSAY OF VANCOMYCIN: CPT | Performed by: INTERNAL MEDICINE

## 2024-01-31 RX ORDER — POTASSIUM CHLORIDE 20 MEQ/1
40 TABLET, EXTENDED RELEASE ORAL ONCE
Status: COMPLETED | OUTPATIENT
Start: 2024-01-31 | End: 2024-01-31

## 2024-01-31 RX ORDER — VANCOMYCIN HYDROCHLORIDE 1 G/200ML
1000 INJECTION, SOLUTION INTRAVENOUS EVERY 12 HOURS
Status: DISCONTINUED | OUTPATIENT
Start: 2024-01-31 | End: 2024-02-02 | Stop reason: HOSPADM

## 2024-01-31 RX ADMIN — OXYCODONE HYDROCHLORIDE 10 MG: 10 TABLET ORAL at 09:37

## 2024-01-31 RX ADMIN — HYDROMORPHONE HYDROCHLORIDE 0.5 MG: 1 INJECTION, SOLUTION INTRAMUSCULAR; INTRAVENOUS; SUBCUTANEOUS at 06:29

## 2024-01-31 RX ADMIN — OXYCODONE HYDROCHLORIDE 10 MG: 10 TABLET ORAL at 05:35

## 2024-01-31 RX ADMIN — HYDROMORPHONE HYDROCHLORIDE 0.5 MG: 1 INJECTION, SOLUTION INTRAMUSCULAR; INTRAVENOUS; SUBCUTANEOUS at 19:31

## 2024-01-31 RX ADMIN — PANTOPRAZOLE SODIUM 40 MG: 40 TABLET, DELAYED RELEASE ORAL at 05:36

## 2024-01-31 RX ADMIN — HEPARIN SODIUM 5000 UNITS: 5000 INJECTION INTRAVENOUS; SUBCUTANEOUS at 15:30

## 2024-01-31 RX ADMIN — HEPARIN SODIUM 5000 UNITS: 5000 INJECTION INTRAVENOUS; SUBCUTANEOUS at 05:36

## 2024-01-31 RX ADMIN — HEPARIN SODIUM 5000 UNITS: 5000 INJECTION INTRAVENOUS; SUBCUTANEOUS at 21:16

## 2024-01-31 RX ADMIN — HYDROMORPHONE HYDROCHLORIDE 0.5 MG: 1 INJECTION, SOLUTION INTRAMUSCULAR; INTRAVENOUS; SUBCUTANEOUS at 11:25

## 2024-01-31 RX ADMIN — VANCOMYCIN HYDROCHLORIDE 1250 MG: 10 INJECTION, POWDER, LYOPHILIZED, FOR SOLUTION INTRAVENOUS at 09:31

## 2024-01-31 RX ADMIN — OXYCODONE HYDROCHLORIDE 10 MG: 10 TABLET ORAL at 00:34

## 2024-01-31 RX ADMIN — ASPIRIN 81 MG: 81 TABLET, COATED ORAL at 09:31

## 2024-01-31 RX ADMIN — OXYCODONE HYDROCHLORIDE 10 MG: 10 TABLET ORAL at 16:34

## 2024-01-31 RX ADMIN — HYDROMORPHONE HYDROCHLORIDE 0.5 MG: 1 INJECTION, SOLUTION INTRAMUSCULAR; INTRAVENOUS; SUBCUTANEOUS at 02:24

## 2024-01-31 RX ADMIN — VANCOMYCIN HYDROCHLORIDE 1000 MG: 1 INJECTION, SOLUTION INTRAVENOUS at 21:16

## 2024-01-31 RX ADMIN — POTASSIUM CHLORIDE 40 MEQ: 1500 TABLET, EXTENDED RELEASE ORAL at 09:31

## 2024-01-31 NOTE — PHYSICAL THERAPY NOTE
01/31/24 1047   Note Type   Note type Cancelled Session   Cancel Reasons Medical status     Eval deferred- pts w/c fitting/adjustment won't be completed until 2/5, and also waiting on formal mobility instructions and precautions prior to attempting to mobilize pt.  Will follow as appropriate throughout stay.  Jules Ogden PT, DPT CSRS

## 2024-01-31 NOTE — PLAN OF CARE
Problem: INFECTION - ADULT  Goal: Absence or prevention of progression during hospitalization  Description: INTERVENTIONS:  - Assess and monitor for signs and symptoms of infection  - Monitor lab/diagnostic results  - Monitor all insertion sites, i.e. indwelling lines, tubes, and drains  - Monitor endotracheal if appropriate and nasal secretions for changes in amount and color  - [place] appropriate cooling/warming therapies per order  - Administer medications as ordered  - Instruct and encourage patient and family to use good hand hygiene technique  - Identify and instruct in appropriate isolation precautions for identified infection/condition  Outcome: Progressing     Problem: INFECTION - ADULT  Goal: Absence of fever/infection during neutropenic period  Description: INTERVENTIONS:  - Monitor WBC    Outcome: Progressing

## 2024-01-31 NOTE — ASSESSMENT & PLAN NOTE
----- Message from Dustin Krueger MA sent at 1/31/2024 10:17 AM CST -----  Regarding: PV 2/14/24 @ 1:40 Dr. Lee  1. Are there any outstanding tasks in the patient's chart? Yes, fasting labs    2. Is there any documentation in the chart? No    3.Has patient been seen in an ER, Urgent care clinic, or been admitted since last visit?  If yes, When, where, and why    4. Has patient seen any other healthcare providers since last visit?  If yes, when, where, and why    5. Has patient had any bloodwork or XR done since last visit?    6. Is patient signed up for patient portal?       Uses motorized wheelchair   Frequent repositioning at home

## 2024-01-31 NOTE — PROGRESS NOTES
Rikki Arguello is a 64 y.o. male who is currently ordered Vancomycin IV with management by the Pharmacy Consult service.  Relevant clinical data and objective / subjective history reviewed.  Vancomycin Assessment:  Indication and Goal AUC/Trough: Bone/joint infection (goal -600, trough >10), MRSA, -600, trough >10  Clinical Status: stable  Micro:   No new  Renal Function:  SCr: 0.35 mg/dL  CrCl: 200 mL/min  Renal replacement: Not on dialysis  Days of Therapy: 3  Current Dose: 1gm q12  Vancomycin Plan:  New Dosing:   Estimated AUC: 409 mcg*hr/mL  Estimated Trough: 11.5 mcg/mL  Next Level: 2-7 am  Renal Function Monitoring: Daily BMP and UOP  Pharmacy will continue to follow closely for s/sx of nephrotoxicity, infusion reactions and appropriateness of therapy.  BMP and CBC will be ordered per protocol. We will continue to follow the patient’s culture results and clinical progress daily.    Jairo Ortega, Pharmacist

## 2024-01-31 NOTE — PROGRESS NOTES
Progress Note - Infectious Disease   Rikki Arguello 64 y.o. male MRN: [id number]  Unit/Bed#: [facility] 402-01 Encounter: [id number]      Impression/Recommendations:  Chronic osteomyelitis of the right 6 through 8 ribs with pathologic fracture.  Etiology of osteomyelitis is chronically nonhealing right chest wall decubitus ulcer.  Patient was started on cefazolin for possible cellulitis.  Patient is status post I&D with partial resection of 8th rib and latissimus muscle flap and wound closure.  Although wound culture grew GBS, operative culture grew MRSA.  Patient will need long-term IV antibiotic.  Continue IV vancomycin.  Treat x 6 weeks IV antibiotic, through 3/11.     Possible chest wall cellulitis, with patient started on antibiotic preop.  Antibiotic plan as in above.  Monitor temperature/WBC.     Chronic and progressing right chest wall decubitus ulcer, likely secondary to lack of padding of the wheelchair.    Continue local wound care for now.  Aggressive offloading of right chest wall.     4. Longstanding paraplegia, wheelchair-bound, with multiple prior decubitus ulcers, including right lower leg resulting in right AKA.     5. DM, reasonably well-controlled.  This contributes to poor wound healing and infection above.  Management per primary service.     Discussed with patient in detail regarding the above plan.  Okay for discharge from ID viewpoint.  Rx on chart for home IV antibiotic.  Follow-up with us 2 weeks after discharge.     Antibiotics:  Vancomycin # 3  POD # 5     Subjective:  Patient is well.  Chest wall pain mild now.  Temperature stays down.  No chills.  He is tolerating antibiotic well.  No nausea, vomiting or diarrhea.     Objective:  Vitals:  Temp:  [98.2 °F (36.8 °C)-98.3 °F (36.8 °C)] 98.2 °F (36.8 °C)  HR:  [71-73] 72  Resp:  [16] 16  BP: (116-164)/(58-78) 116/58  SpO2:  [96 %-99 %] 96 %  Temp (24hrs), Av.3 °F (36.8 °C), Min:98.2 °F (36.8 °C), Max:98.3 °F (36.8 °C)  Current: Temperature:  98.2 °F (36.8 °C)    Physical Exam:     General: Awake, alert, cooperative, no distress.   Neck:  Supple. No mass.  No lymphadenopathy.   Chest:  With dressing in place.  Dressing is dry.  No erythema/warmth.  Minimal tenderness.  BALTAZAR drain serous.   Lungs: Decreased breath sounds on right, no rales, no wheezing, respirations unlabored.   Heart:  Regular rate and rhythm, S1 and S2 normal, no murmur.   Abdomen: Soft, nondistended, non-tender, bowel sounds active all four quadrants, no masses, no organomegaly.   Extremities: No edema. No erythema/warmth. No draining ulcer. Nontender to palpation.   Skin:  No rash.   Neuro: Stable chronic paraplegia.     Invasive Devices       Peripherally Inserted Central Catheter Line  Duration             PICC Line 01/29/24 Left Basilic 1 day              Peripheral Intravenous Line  Duration             Peripheral IV 01/28/24 Proximal;Right;Ventral (anterior) Forearm 2 days              Drain  Duration             Colostomy LLQ 13 days    Suprapubic Catheter 13 days    Closed/Suction Drain Lateral RUQ Bulb 15 Fr. 5 days    Closed/Suction Drain Lateral RUQ Bulb 15 Fr. 5 days    Closed/Suction Drain Lateral RUQ Bulb 15 Fr. 5 days                    Labs studies:   I have personally reviewed pertinent labs.  Results from last 7 days   Lab Units 01/31/24  0528 01/28/24  0614 01/27/24  0600   POTASSIUM mmol/L 3.7 3.8 3.7   CHLORIDE mmol/L 103 104 99   CO2 mmol/L 26 26 27   BUN mg/dL 19 17 17   CREATININE mg/dL 0.35* 0.37* 0.41*   EGFR ml/min/1.73sq m 133 130 125   CALCIUM mg/dL 8.6 8.7 8.5     Results from last 7 days   Lab Units 01/28/24  0614 01/27/24  0600 01/26/24  0832   WBC Thousand/uL 5.76 6.39 8.83   HEMOGLOBIN g/dL 8.1* 8.6* 8.4*   PLATELETS Thousands/uL 364 332 335     Results from last 7 days   Lab Units 01/27/24  0824 01/25/24  1020 01/25/24  1019   GRAM STAIN RESULT   --  1+ Polys  No bacteria seen Rare Polys  No organisms seen   MRSA CULTURE ONLY  Methicillin Resistant  Staphylococcus aureus isolated*  This patient requires contact isolation precautions per [place] law. Contact precautions are not required in [place] for nasal surveillance cultures.  --   --        Imaging Studies:   I have personally reviewed pertinent imaging study reports and images in PACS.    EKG, Pathology, and Other Studies:   I have personally reviewed pertinent reports.

## 2024-01-31 NOTE — WOUND OSTOMY CARE
Progress Note - Wound   Rikki RANDAL Moscat 64 y.o. male MRN: [id number]  Unit/Bed#: [facility] 402-01 Encounter: [id number]        Assessment:   Patient seen today for wound care follow up assessment. Patient has supra pubic catheter and colostomy for urine and stool management. Patient is mod assist with turning from side to side. Patient is independent with meals. Patient is currently on P-500 specialty bed. Patient had recent chest wall reconstruction surgery.     Left heel intact and blanching, preventative foam in place.     Left buttocks- wound is improving with this week's assessment and is full thickness with pink granular tissue, two small areas measured as one, periwound skin is pink scar tissue, small serosanguineous drainage.   POA unstageable perineum- significant improvement with this week's assessment, wound is full thickness with 70% slough tissue and 30% beefy red tissue, periwound skin is fragile, small serosanguineous drainage present.   POA stage IV left ischium- wound appears to be mostly pink granular tissue with small area of slough tissue, periwound is macerated, significant undermining noted, which has improved with this week's assessment.     No induration, fluctuance, odor, warmth/temperature differences, redness, or purulence noted to the above noted wounds and skin areas assessed. New dressings applied per orders listed below. Patient tolerated well- no s/s of non-verbal pain or discomfort observed during the encounter. Bedside nurse aware of plan of care. See flow sheets for more detailed assessment findings. Wound care will continue to follow.         Skin Care orders:  1-Silicone bordered foam to the left heel parminder with a P and date and check skin integrity every shift change every 3 days   2-Roho  cushion when out of bed in chair.  3-Moisturize skin daily with skin nourishing cream  4-Elevate Left heel to offload pressure.  5-Turn/reposition q2h for pressure re-distribution on skin  6. Right  flank wound - per plastics team  7. Left  buttocks wound - cleanse with Normal saline then apply meglisorb calcium alginate then top with the silicone bordered foam change every other day   8. Left ischial wound - cleanse with Normal saline then pack with wet to dry jagjit and secure with the silicone bordered foam change daily   9. Posterior perineum cleanse with soap and water then apply triad paste to the open area TID and prn   10 P - 500 low air loss mattress     Wound 08/26/20 Pressure Injury Buttocks Left (Active)   Wound Image   01/31/24 1325   Wound Description Beefy red;Rolled edges 01/31/24 1325   Pressure Injury Stage 4 01/31/24 1325   Shelby-wound Assessment Clean;Intact;Dry 01/31/24 1325   Wound Length (cm) 3 cm 01/31/24 1325   Wound Width (cm) 1.2 cm 01/31/24 1325   Wound Depth (cm) 1.4 cm 01/31/24 1325   Wound Surface Area (cm^2) 3.6 cm^2 01/31/24 1325   Wound Volume (cm^3) 5.04 cm^3 01/31/24 1325   Calculated Wound Volume (cm^3) 5.04 cm^3 01/31/24 1325   Change in Wound Size % 82.5 01/31/24 1325   Number of underminings 1 01/31/24 1325   Undermining 1 6.5 01/31/24 1325   Undermining 1 is depth extending from 3-12 01/31/24 1325   Wound Site Closure DISHA 01/31/24 1325   Drainage Amount Moderate 01/31/24 1325   Drainage Description Serosanguineous 01/31/24 1325   Non-staged Wound Description Full thickness 01/31/24 1325   Treatments Cleansed 01/31/24 1325   Dressing Foam, Silicon (eg. Allevyn, etc);Packings 01/31/24 1325   Wound packed? Yes 01/31/24 1325   Packing- # removed 1 01/31/24 1325   Packing- # inserted 1 01/31/24 1325   Dressing Changed New 01/31/24 1325   Patient Tolerance Tolerated well 01/31/24 1325   Dressing Status Clean;Dry;Intact 01/31/24 1325       Wound 11/05/21 Pressure Injury Perineum (Active)   Wound Image   01/31/24 1328   Wound Description Pink;Slough 01/31/24 1328   Pressure Injury Stage U 01/31/24 1328   Shelby-wound Assessment Fragile 01/31/24 1328   Wound Length (cm) 1 cm 01/31/24  1328   Wound Width (cm) 2 cm 01/31/24 1328   Wound Depth (cm) 0.2 cm 01/31/24 1328   Wound Surface Area (cm^2) 2 cm^2 01/31/24 1328   Wound Volume (cm^3) 0.4 cm^3 01/31/24 1328   Calculated Wound Volume (cm^3) 0.4 cm^3 01/31/24 1328   Change in Wound Size % -300 01/31/24 1328   Wound Site Closure DISHA 01/31/24 1328   Drainage Amount Small 01/31/24 1328   Drainage Description Serosanguineous 01/31/24 1328   Non-staged Wound Description Full thickness 01/31/24 1328   Treatments Cleansed 01/31/24 1328   Dressing Moisture barrier 01/31/24 1328   Wound packed? No 01/31/24 1328   Packing- # removed 0 01/31/24 1328   Packing- # inserted 0 01/31/24 1328   Dressing Changed New 01/31/24 1328   Patient Tolerance Tolerated well 01/31/24 1328   Dressing Status Clean;Dry;Intact 01/31/24 1328       Wound 01/17/24 Pressure Injury Flank Right;Upper (Active)   Wound Image   01/18/24 1128   Wound Description DISHA 01/31/24 0800   Pressure Injury Stage 4 01/18/24 1128   Shelby-wound Assessment DISHA 01/31/24 0800   Wound Length (cm) 6 cm 01/18/24 1128   Wound Width (cm) 2.8 cm 01/18/24 1128   Wound Depth (cm) 1.7 cm 01/18/24 1128   Wound Surface Area (cm^2) 16.8 cm^2 01/18/24 1128   Wound Volume (cm^3) 28.56 cm^3 01/18/24 1128   Calculated Wound Volume (cm^3) 28.56 cm^3 01/18/24 1128   Number of underminings 1 01/18/24 1128   Undermining 1 4 01/18/24 1128   Undermining 1 is depth extending from 11-7 01/18/24 1128   Drainage Amount Moderate 01/31/24 0800   Drainage Description Serosanguineous 01/31/24 0800   Non-staged Wound Description Full thickness 01/31/24 0800   Treatments Cleansed;Site care;Irrigation with NSS 01/28/24 1600   Dressing ABD;Gauze 01/31/24 0800   Wound packed? Yes 01/19/24 2032   Dressing Changed Reinforced 01/29/24 0800   Patient Tolerance Tolerated well 01/23/24 1230   Dressing Status Clean;Dry;Intact 01/31/24 0800   Wound 01/18/24 Buttocks Left (Active)   Wound Image   01/31/24 1327   Wound Description Beef  red;Epithelialization;Fragile 01/31/24 1327   Shelby-wound Assessment Scar Tissue 01/31/24 1327   Wound Length (cm) 8.5 cm 01/31/24 1327   Wound Width (cm) 1.2 cm 01/31/24 1327   Wound Depth (cm) 0.2 cm 01/31/24 1327   Wound Surface Area (cm^2) 10.2 cm^2 01/31/24 1327   Wound Volume (cm^3) 2.04 cm^3 01/31/24 1327   Calculated Wound Volume (cm^3) 2.04 cm^3 01/31/24 1327   Change in Wound Size % -284.91 01/31/24 1327   Wound Site Closure DISHA 01/31/24 1327   Drainage Amount Small 01/31/24 1327   Drainage Description Serosanguineous 01/31/24 1327   Non-staged Wound Description Full thickness 01/31/24 1327   Treatments Cleansed 01/31/24 1327   Dressing Calcium Alginate with Silver;Foam, Silicon (eg. Allevyn, etc) 01/31/24 1327   Wound packed? No 01/31/24 1327   Packing- # removed 0 01/31/24 1327   Packing- # inserted 0 01/31/24 1327   Dressing Changed New 01/31/24 1327   Patient Tolerance Tolerated well 01/31/24 1327   Dressing Status Clean;Dry;Intact 01/31/24 1327           Yessica Hubbard BSN, RN, CWOCN

## 2024-01-31 NOTE — PROGRESS NOTES
Progress Note - Thoracic Surgery   Rikki Farooqcat 64 y.o. male MRN: [id number]  Unit/Bed#: [facility] 402-01 Encounter: [id number]    Assessment:  64-year-old male with chronic chest wound and rib osteomyelitis.    Status post 1/25 resection of eighth rib osteomyelitis, latissimus flap closure.    History of paraplegia, colostomy, right leg amputation, decubitus ulcer.    VSS   UOP 2.45 L  Drains: 115 cc serosang      Plan:  - Continue regular diet, nutritional supplements  - Drain management per plastic surgery  - Continue IV antibiotics   - Pain and nausea control PRN  - Encourage IS  - DVT ppx   ID: 6 weeks IV vancomycin through 3/11   -Will need abx ordered for outpt, PICC placed 1/29        Subjective:  Patient seen and examined bedside.  No new symptoms or events.    Objective:    Vitals:     Temp:  [98.3 °F (36.8 °C)-98.6 °F (37 °C)] 98.3 °F (36.8 °C)  HR:  [71-74] 71  Resp:  [16] 16  BP: (142-164)/(70-78) 164/70  Body mass index is 22.71 kg/m².    Physical Exam:   General - no acute distress, responsive and cooperative  CV - warm, regular rate, addy vac and JPs  Pulm - normal work of breathing, no respiratory distress  Abd - soft, nondistended, ostomy viable, suprapubic cath  Neuro - m/s grossly intact, cn grossly intact  Ext - moving all extremities        I/O:      Intake/Output Summary (Last 24 hours) at 1/31/2024 0652  Last data filed at 1/31/2024 0557  Gross per 24 hour   Intake 908 ml   Output 2565 ml   Net -1657 ml       Lab Results:  01/31/24 labs pending  Recent Labs     01/31/24  0528   SODIUM 135   K 3.7      CO2 26   BUN 19   CREATININE 0.35*   GLUC 72   CALCIUM 8.6

## 2024-01-31 NOTE — OCCUPATIONAL THERAPY NOTE
OT CANCEL NOTE:    Orders for evaluation received. Will hold off on eval until further information has been obtained re mobility status and precautions, PLOF. W/c fitting/eval scheduled for 2/5 w outside company. Per chart, pt is total assist for ADL's at baseline. Will follow for OT eval as appropriate.

## 2024-02-01 LAB
GLUCOSE SERPL-MCNC: 82 MG/DL (ref 65–140)
GLUCOSE SERPL-MCNC: 94 MG/DL (ref 65–140)
GLUCOSE SERPL-MCNC: 99 MG/DL (ref 65–140)

## 2024-02-01 PROCEDURE — 99232 SBSQ HOSP IP/OBS MODERATE 35: CPT | Performed by: INTERNAL MEDICINE

## 2024-02-01 PROCEDURE — 82948 REAGENT STRIP/BLOOD GLUCOSE: CPT

## 2024-02-01 PROCEDURE — 99024 POSTOP FOLLOW-UP VISIT: CPT | Performed by: THORACIC SURGERY (CARDIOTHORACIC VASCULAR SURGERY)

## 2024-02-01 PROCEDURE — 99024 POSTOP FOLLOW-UP VISIT: CPT

## 2024-02-01 PROCEDURE — 97163 PT EVAL HIGH COMPLEX 45 MIN: CPT

## 2024-02-01 RX ORDER — POTASSIUM CHLORIDE 20 MEQ/1
40 TABLET, EXTENDED RELEASE ORAL ONCE
Status: DISCONTINUED | OUTPATIENT
Start: 2024-02-01 | End: 2024-02-01

## 2024-02-01 RX ADMIN — HYDROMORPHONE HYDROCHLORIDE 0.5 MG: 1 INJECTION, SOLUTION INTRAMUSCULAR; INTRAVENOUS; SUBCUTANEOUS at 14:09

## 2024-02-01 RX ADMIN — HYDROMORPHONE HYDROCHLORIDE 0.5 MG: 1 INJECTION, SOLUTION INTRAMUSCULAR; INTRAVENOUS; SUBCUTANEOUS at 03:40

## 2024-02-01 RX ADMIN — HEPARIN SODIUM 5000 UNITS: 5000 INJECTION INTRAVENOUS; SUBCUTANEOUS at 22:29

## 2024-02-01 RX ADMIN — ASPIRIN 81 MG: 81 TABLET, COATED ORAL at 08:32

## 2024-02-01 RX ADMIN — VANCOMYCIN HYDROCHLORIDE 1000 MG: 1 INJECTION, SOLUTION INTRAVENOUS at 08:32

## 2024-02-01 RX ADMIN — VANCOMYCIN HYDROCHLORIDE 1000 MG: 1 INJECTION, SOLUTION INTRAVENOUS at 20:06

## 2024-02-01 RX ADMIN — OXYCODONE HYDROCHLORIDE 10 MG: 10 TABLET ORAL at 02:30

## 2024-02-01 RX ADMIN — OXYCODONE HYDROCHLORIDE 10 MG: 10 TABLET ORAL at 22:26

## 2024-02-01 RX ADMIN — HYDROMORPHONE HYDROCHLORIDE 0.5 MG: 1 INJECTION, SOLUTION INTRAMUSCULAR; INTRAVENOUS; SUBCUTANEOUS at 20:14

## 2024-02-01 RX ADMIN — OXYCODONE HYDROCHLORIDE 10 MG: 10 TABLET ORAL at 12:36

## 2024-02-01 RX ADMIN — HYDROMORPHONE HYDROCHLORIDE 0.5 MG: 1 INJECTION, SOLUTION INTRAMUSCULAR; INTRAVENOUS; SUBCUTANEOUS at 08:25

## 2024-02-01 RX ADMIN — OXYCODONE HYDROCHLORIDE 10 MG: 10 TABLET ORAL at 17:52

## 2024-02-01 RX ADMIN — OXYCODONE HYDROCHLORIDE 10 MG: 10 TABLET ORAL at 06:26

## 2024-02-01 RX ADMIN — HEPARIN SODIUM 5000 UNITS: 5000 INJECTION INTRAVENOUS; SUBCUTANEOUS at 06:11

## 2024-02-01 RX ADMIN — HYDROMORPHONE HYDROCHLORIDE 0.5 MG: 1 INJECTION, SOLUTION INTRAMUSCULAR; INTRAVENOUS; SUBCUTANEOUS at 23:38

## 2024-02-01 RX ADMIN — PANTOPRAZOLE SODIUM 40 MG: 40 TABLET, DELAYED RELEASE ORAL at 06:11

## 2024-02-01 RX ADMIN — HEPARIN SODIUM 5000 UNITS: 5000 INJECTION INTRAVENOUS; SUBCUTANEOUS at 14:09

## 2024-02-01 NOTE — CASE MANAGEMENT
Case Management Discharge Planning Note    Patient name Rikki Arguello  [facility] MRN [id number]  : 1960 Date 2024       Current Admission Date: 2024  Current Admission Diagnosis:Stage IV pressure ulcer of right lower back (HCC)   Patient Active Problem List    Diagnosis Date Noted    Subacute osteomyelitis, other site ([facility]) 2023    Foul smelling urine 2023    Pressure ulcer of left buttock, stage 4 ([facility]) 01/10/2023    Heme positive stool 2022    Open wound of buttock, left, initial encounter 2022    Pressure injury of contiguous region involving back and left buttock, stage 4 ([facility]) 2021    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 ([facility]) 2021    Stage IV pressure ulcer of right lower back ([facility]) 2021    Type 2 diabetes mellitus without complication, without long-term current use of insulin ([facility]) 2021    Renal cyst 2019    Other male erectile dysfunction 2019    Prostate cancer screening 2019    Hypokalemia 10/29/2019    SBO (small bowel obstruction) ([facility]) 10/28/2019    Sepsis ([facility]) 10/28/2019    Stage II pressure ulcer of buttock ([facility]) 2019    Left perineal ischial pressure ulcer, stage IV ([facility]) 2019    Hyperkalemia 2019    History of Clostridium difficile colitis 2019    Neurogenic bladder 2019    Sepsis with hypotension  2019    Osteomyelitis of pelvic region ([facility]) 2019    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    Colostomy in place ([facility]) 2019    Colon cancer screening 2019    Dyspepsia 2019    Epigastric pain 2019    Continuous opioid dependence ([facility])     Decubitus ulcer of ischium, left, stage IV ([facility]) 2018    Diarrhea 2018    Amputated right leg ([facility]) 2018    Anxiety 2018    GERD (gastroesophageal reflux disease)     History of osteomyelitis     Cyst of joint of shoulder 10/20/2016    Anemia 10/20/2016    Paraplegic spinal  paralysis (HCC) 10/20/2016    Sinus tachycardia 10/20/2016    Stage IV pressure ulcer of sacral region (HCC) 10/15/2016    Stage IV pressure ulcer of left heel (HCC) 10/15/2016    Diabetes mellitus type II, controlled (HCC) 03/07/2014    Benign essential hypertension 07/31/2013      LOS (days): 15  Geometric Mean LOS (GMLOS) (days): 3.4  Days to GMLOS:-11.3     OBJECTIVE:  Risk of Unplanned Readmission Score: 13.55         Current admission status: Inpatient   Preferred Pharmacy:   [id number] [place] - [address]  [address]  [facility] [address]  Phone: [phone] Fax: [phone]    [id number] [place] [address]  [address] [facility] [address]  Phone: [phone] Fax: [phone]    Primary Care Provider: Lexy Bo MD    Primary Insurance: MEDICARE  Secondary Insurance: [facility]    DISCHARGE DETAILS:                                                                                                 Additional Comments: Received Vancomycin IV Rx today and sent to [facility] to verify and check copay. Per PT/OT they have many questions on allowed activities, will send to Dr. Rodgers and plastic surgery. As w/c specialist can not come before 2/5/23 pt may need to remain.

## 2024-02-01 NOTE — PROGRESS NOTES
Rikki Arguello is a 64 y.o. male who is currently ordered Vancomycin IV with management by the Pharmacy Consult service.  Relevant clinical data and objective / subjective history reviewed.  Vancomycin Assessment:  Indication and Goal AUC/Trough: Bone/joint infection (goal -600, trough >10), MRSA, -600, trough >10  Clinical Status: stable  Micro:   MRSA positive  Renal Function:  SCr: 0.35 mg/dL  CrCl: 200 mL/min  Renal replacement: Not on dialysis  Days of Therapy: 4  Current Dose: 1gm q12  Vancomycin Plan:  New Dosing:   Estimated AUC: 409 mcg*hr/mL  Estimated Trough: 11.5 mcg/mL  Next Level: 2-7 am  Renal Function Monitoring: Daily BMP and UOP  Pharmacy will continue to follow closely for s/sx of nephrotoxicity, infusion reactions and appropriateness of therapy.  BMP and CBC will be ordered per protocol. We will continue to follow the patient’s culture results and clinical progress daily.    Alessandra Ojeda, Pharmacist

## 2024-02-01 NOTE — OCCUPATIONAL THERAPY NOTE
Occupational Therapy Screen Note        Patient Name: Rikki Arguello  Today's Date: 2/1/2024 02/01/24 1131   OT Last Visit   OT Visit Date 02/01/24   Note Type   Note type Screen       OT consult received, chart reviewed. Pt admitted with chronic chest wound and rib osteomyelitis, s/p 1/25 resection of 8th rib osteomyelitis, latissimus flap closure. Pt also with hx of paraplegia, colostomy, R leg AKA, decubitus ulcer. Per pt report, CM, and PT pt requires assist for all ADL, IADL and fxnal mobility tasks with pt rolling to R normally to transfer to wheelchair, although pt is unable to complete transfers in this way due to restrictions that pt is unable to roll onto R side or put pressure on R side (NWB RUE). After discussion with PT and pt, pt is DEP to roll to L side to initiate transfer, indicating need for meg/mechanical lift (cleared by plastics as long as straps not positioned over surgical site. On this date pt is unsafe to mobilize for evaluation, although had lengthy discussion with PT regarding plan going forward. Please refer to PT note for pt recommendations regarding transfers/home needs. Pt does not meet criteria for skilled acute OT services as he has full assist for ADL and IADL from caregiver who is present 8am-9pm at pt home. Will therefore sign off at this time and recommend pt return home with needs as recommended by PT, D/C acute OT. Thank you.      Omayra Donis, PABLO, OTR/L

## 2024-02-01 NOTE — PROGRESS NOTES
Progress Note - Infectious Disease   Rikki Arguello 64 y.o. male MRN: [id number]  Unit/Bed#: [facility] 402-01 Encounter: [id number]      Impression/Recommendations:  Chronic osteomyelitis of the right 6 through 8 ribs with pathologic fracture.  Etiology of osteomyelitis is chronically nonhealing right chest wall decubitus ulcer.  Patient was started on cefazolin for possible cellulitis.  Patient is status post I&D with partial resection of 8th rib and latissimus muscle flap and wound closure.  Patient likely has residual osteomyelitis of rib.  Although wound culture grew GBS, operative culture grew MRSA.  Patient will need long-term IV antibiotic.  Continue IV vancomycin.  Treat x 6 weeks IV antibiotic, through 3/11.     Possible chest wall cellulitis, with patient started on cefazolin preop.  Antibiotic plan as in above.  Monitor temperature/WBC.     Chronic and progressing right chest wall decubitus ulcer, likely secondary to lack of padding of the wheelchair.    Continue local wound care for now.  Aggressive offloading of right chest wall.     4. Longstanding paraplegia, wheelchair-bound, with multiple prior decubitus ulcers, including right lower leg resulting in right AKA.     5. DM, reasonably well-controlled.  This contributes to poor wound healing and infection above.  Management per primary service.     Discussed with patient in detail regarding the above plan.  Okay for discharge from ID viewpoint.  Rx on chart for home IV antibiotic.  Follow-up with us 2 weeks after discharge.     Antibiotics:  Vancomycin # 4  POD # 6     Subjective:  Patient feels well.  Chest wall pain mild.  Temperature stays down.  No chills.  He is tolerating antibiotic well.  No nausea, vomiting or diarrhea.    Objective:  Vitals:  Temp:  [97.7 °F (36.5 °C)-98.7 °F (37.1 °C)] 98.3 °F (36.8 °C)  HR:  [76-90] 90  Resp:  [16-18] 18  BP: (103-133)/(60-70) 106/60  SpO2:  [97 %] 97 %  Temp (24hrs), Av.2 °F (36.8 °C), Min:97.7 °F (36.5  °C), Max:98.7 °F (37.1 °C)  Current: Temperature: 98.3 °F (36.8 °C)    Physical Exam:     General: Awake, alert, cooperative, no distress.   Neck:  Supple. No mass.  No lymphadenopathy.   Chest:  Right chest wound with dressing in place.  Dressing is dry.  Drains in place, serous.  No erythema/warm beyond dressing.  Mild and improved tenderness.   Lungs: Decreased breath sounds on right, no rales, no wheezing, respirations unlabored.   Heart:  Regular rate and rhythm, S1 and S2 normal, no murmur.   Abdomen: Soft, nondistended, non-tender, bowel sounds active all four quadrants, no masses, no organomegaly.   Extremities: No edema. No erythema/warmth. No ulcer. Nontender to palpation.   Skin:  No rash.   Neuro: Stable chronic paraplegia.     Invasive Devices       Peripherally Inserted Central Catheter Line  Duration             PICC Line 01/29/24 Left Basilic 2 days              Peripheral Intravenous Line  Duration             Peripheral IV 01/28/24 Proximal;Right;Ventral (anterior) Forearm 3 days              Drain  Duration             Colostomy LLQ 14 days    Suprapubic Catheter 14 days    Closed/Suction Drain Lateral RUQ Bulb 15 Fr. 6 days    Closed/Suction Drain Lateral RUQ Bulb 15 Fr. 6 days    Closed/Suction Drain Lateral RUQ Bulb 15 Fr. 6 days                    Labs studies:   I have personally reviewed pertinent labs.  Results from last 7 days   Lab Units 01/31/24  0528 01/28/24  0614 01/27/24  0600   POTASSIUM mmol/L 3.7 3.8 3.7   CHLORIDE mmol/L 103 104 99   CO2 mmol/L 26 26 27   BUN mg/dL 19 17 17   CREATININE mg/dL 0.35* 0.37* 0.41*   EGFR ml/min/1.73sq m 133 130 125   CALCIUM mg/dL 8.6 8.7 8.5     Results from last 7 days   Lab Units 01/28/24  0614 01/27/24  0600 01/26/24  0832   WBC Thousand/uL 5.76 6.39 8.83   HEMOGLOBIN g/dL 8.1* 8.6* 8.4*   PLATELETS Thousands/uL 364 332 335     Results from last 7 days   Lab Units 01/27/24  0824 01/25/24  1020 01/25/24  1019   GRAM STAIN RESULT   --  1+ Polys   No bacteria seen Rare Polys  No organisms seen   MRSA CULTURE ONLY  Methicillin Resistant Staphylococcus aureus isolated*  This patient requires contact isolation precautions per [place] law. Contact precautions are not required in [place] for nasal surveillance cultures.  --   --        Imaging Studies:   I have personally reviewed pertinent imaging study reports and images in PACS.    EKG, Pathology, and Other Studies:   I have personally reviewed pertinent reports.

## 2024-02-01 NOTE — CASE MANAGEMENT
Case Management Discharge Planning Note    Patient name Rikki Arguello  [facility] [id number]  : 1960 Date 2024       Current Admission Date: 2024  Current Admission Diagnosis:Stage IV pressure ulcer of right lower back (HCC)   Patient Active Problem List    Diagnosis Date Noted    Subacute osteomyelitis, other site ([facility]) 2023    Foul smelling urine 2023    Pressure ulcer of left buttock, stage 4 ([facility]) 01/10/2023    Heme positive stool 2022    Open wound of buttock, left, initial encounter 2022    Pressure injury of contiguous region involving back and left buttock, stage 4 ([facility]) 2021    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 ([facility]) 2021    Stage IV pressure ulcer of right lower back ([facility]) 2021    Type 2 diabetes mellitus without complication, without long-term current use of insulin ([facility]) 2021    Renal cyst 2019    Other male erectile dysfunction 2019    Prostate cancer screening 2019    Hypokalemia 10/29/2019    SBO (small bowel obstruction) ([facility]) 10/28/2019    Sepsis ([facility]) 10/28/2019    Stage II pressure ulcer of buttock ([facility]) 2019    Left perineal ischial pressure ulcer, stage IV ([facility]) 2019    Hyperkalemia 2019    History of Clostridium difficile colitis 2019    Neurogenic bladder 2019    Sepsis with hypotension  2019    Osteomyelitis of pelvic region ([facility]) 2019    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    Colostomy in place ([facility]) 2019    Colon cancer screening 2019    Dyspepsia 2019    Epigastric pain 2019    Continuous opioid dependence ([facility])     Decubitus ulcer of ischium, left, stage IV ([facility]) 2018    Diarrhea 2018    Amputated right leg ([facility]) 2018    Anxiety 2018    GERD (gastroesophageal reflux disease)     History of osteomyelitis     Cyst of joint of shoulder 10/20/2016    Anemia 10/20/2016    Paraplegic spinal  paralysis (HCC) 10/20/2016    Sinus tachycardia 10/20/2016    Stage IV pressure ulcer of sacral region (HCC) 10/15/2016    Stage IV pressure ulcer of left heel (HCC) 10/15/2016    Diabetes mellitus type II, controlled (HCC) 03/07/2014    Benign essential hypertension 07/31/2013      LOS (days): 15  Geometric Mean LOS (GMLOS) (days): 3.4  Days to GMLOS:-11.5     OBJECTIVE:  Risk of Unplanned Readmission Score: 13.44         Current admission status: Inpatient   Preferred Pharmacy:   [id number] [place] - [address]  [address]  [facility] [address]  Phone: [phone] Fax: [phone]    [id number] [place] [address]  [address] [facility] [address]  Phone: [phone] Fax: [phone]    Primary Care Provider: Lexy Bo MD    Primary Insurance: MEDICARE  [facility]: [facility]    DISCHARGE DETAILS:                                Requested Home Health Care         Homebound Criteria Met:: Requires Medical Transportation, Requires the Assistance of Another Person for Safe Ambulation or to Leave the Home  Supporting Clincal Findings:: Bed Bound or Wheelchair Bound, Limited Endurance    DME Referral Provided  DME Supplier Name:: [employer]                                                  IMM Given (Date):: 02/01/24  IMM Given to:: Patient     Additional Comments: CM communicated with Dr. Rodgers, Dr. Bell (plastics), PT/OT. [facility] to deliver meds here 2/2/24, will receive second dose of Vancomycin at 2000, ambulance requested for 2100 to home. [facility] will see pt Saturday @1000 for IV antibiotic teaching, wound care. Pt. is bedbound, will use sliding transfer board from ambulance to bed. Lives in first floor apt with ramp. Ambulance aware of contact isolation and cannot lie on right side or put pressure on area. Requested ambulance to transport w/c if possible as family has no way to  get it. He can no longer use this chair until adaptions are made. [employer] specialist will see pt at home on 2/5/24 to assess wheelchair/equipment. Explained all this to patient and he has a good understanding of plan. Awaiting name of ambulance company transporting home,

## 2024-02-01 NOTE — PROGRESS NOTES
Progress Note - Thoracic Surgery   Rikki Farooqcat 64 y.o. male MRN: [id number]  Unit/Bed#: [facility] 402-01 Encounter: [id number]    Assessment:  64-year-old male with chronic chest wound and rib osteomyelitis.    Status post 1/25 resection of eighth rib osteomyelitis, latissimus flap closure.    History of paraplegia, colostomy, right leg amputation, decubitus ulcer.    AVSS  UOP 3L  Drains: 25,100, 20 cc SS      Plan:  - Continue regular diet, nutritional supplements  - BALTAZAR drains per plastics  - addy per plastics plan to remove today.  - Abx per ID, appreciate recommendations. IV vanc through 3/11, PICC placed  - Pain and nausea control PRN  - Encourage IS  - DVT ppx   -Dispo planning        Subjective:  No acute events overnight. Tolerating diet, pain is well controlled.    Objective:    Vitals:     Temp:  [97.7 °F (36.5 °C)-98.7 °F (37.1 °C)] 98.3 °F (36.8 °C)  HR:  [76-90] 90  Resp:  [16-18] 18  BP: (103-133)/(60-70) 106/60  Body mass index is 22.71 kg/m².    Physical Exam:   General: NAD  HENT: NCAT MMM  Neck: supple, no JVD  CV: nl rate  Chest: addy in place c/d/I. BALTAZAR with SS drainage  Lungs: nl wob. No resp distress  ABD: Soft, nontender, nondistended  Extrem: No CCE  Neuro: AAOx3          I/O:      Intake/Output Summary (Last 24 hours) at 2/1/2024 0725  Last data filed at 2/1/2024 0601  Gross per 24 hour   Intake 1280 ml   Output 3145 ml   Net -1865 ml       Lab Results:  02/01/24 labs pending  Recent Labs     01/31/24  0528   SODIUM 135   K 3.7      CO2 26   BUN 19   CREATININE 0.35*   GLUC 72   CALCIUM 8.6

## 2024-02-01 NOTE — PROGRESS NOTES
"Progress Note - Plastic Surgery   Rikki Arguello 64 y.o. male MRN: [id number]  Unit/Bed#: [facility] 402-01 Encounter: [id number]    Assessments  Patient is a 64 year-old male who is POD#7 Right chest wall reconstruction with latissimus flap and rib resection by Dr. Maradiaga and Dr. Bell.    Plan:  -Plan to move forward with discharge.  -KULWINDER vac was removed, incisions covered with Xeroform, ABD and Tegaderms. To be conducted daily by home health nurses.  -Follow up in office in 1 week.  -Continue with recommendations by thoracic and ID.    Subjective/Objective   Patient is a 64 year-old male who is POD#7 Right chest wall reconstruction with latissimus flap and rib resection by Dr. Maradiaga and Dr. Bell. Patient is doing well at this time. No complaints. Eager to discharge to home. Pt states wheelchair company will report to the patient's home to accommodate wheelchair setup.     Objective:     Vitals: Blood pressure 106/60, pulse 90, temperature 98.3 °F (36.8 °C), resp. rate 18, height 5' 7\" (1.702 m), weight 65.8 kg (145 lb), SpO2 97%.,Body mass index is 22.71 kg/m².    I/O         01/30 0701  01/31 0700 01/31 0701  02/01 0700 02/01 0701  02/02 0700    P.O. 658 780     NG/GT 0      IV Piggyback 250 500     Total Intake(mL/kg) 908 (13.8) 1280 (19.5)     Urine (mL/kg/hr) 2450 (1.6) 3000 (1.9)     Drains 145 145     Stool 0      Total Output 2595 3145     Net -9552 -2349                    Physical Exam:  Constitutional:AAOx3. Pt is cooperative and pleasant.  Cardiovascular: Normal rate, regular rhythm.  Pulmonary/Chest: Effort normal and breath sounds normal. No respiratory distress.  Psychiatric: Has appropriate behavior and thought process.   Skin: KULWINDER removed from right back. Incision clean, dry intact. Healing well. No sign of infection or dehiscence. Drains patent with serosanguinous fluid    Rhona Jiménez PA-C  Plastic and Reconstructive Surgery   "

## 2024-02-01 NOTE — PHYSICAL THERAPY NOTE
Physical Therapy Evaluation    Patient's Name: Rikki Arguello    Admitting Diagnosis  Rib pain [R07.81]  Multiple drug resistant organism (MDRO) culture positive [Z16.24]  Other chronic osteomyelitis, other site (HCC) [M86.68]    Problem List  Patient Active Problem List   Diagnosis    Stage IV pressure ulcer of sacral region (HCC)    Stage IV pressure ulcer of left heel (HCC)    Cyst of joint of shoulder    Anemia    Paraplegic spinal paralysis (HCC)    Sinus tachycardia    GERD (gastroesophageal reflux disease)    History of osteomyelitis    Anxiety    Amputated right leg (HCC)    Benign essential hypertension    Diabetes mellitus type II, controlled (HCC)    Diarrhea    Decubitus ulcer of ischium, left, stage IV (HCC)    Continuous opioid dependence (HCC)    Colostomy in place (HCC)    Colon cancer screening    Dyspepsia    Epigastric pain    Osteomyelitis of pelvic region (HCC)    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HCC)    Neurogenic bladder    Sepsis with hypotension     History of Clostridium difficile colitis    Hyperkalemia    Stage II pressure ulcer of buttock (HCC)    Left perineal ischial pressure ulcer, stage IV (HCC)    SBO (small bowel obstruction) (HCC)    Sepsis (HCC)    Hypokalemia    Renal cyst    Other male erectile dysfunction    Prostate cancer screening    Type 2 diabetes mellitus without complication, without long-term current use of insulin (HCC)    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)    Stage IV pressure ulcer of right lower back (HCC)    Pressure injury of contiguous region involving back and left buttock, stage 4 (HCC)    Open wound of buttock, left, initial encounter    Heme positive stool    Pressure ulcer of left buttock, stage 4 (HCC)    Foul smelling urine    Subacute osteomyelitis, other site (HCC)       Past Medical History  Past Medical History:   Diagnosis Date    Anemia     Blind     r eye    Chronic cystitis     Colostomy in place (HCC)     Detrusor sphincter dyssynergia      "Diabetes mellitus (HCC)     Poorly controlled type 2; Last Assessed:  3/18/14    Erectile dysfunction     Frequency of urination     GERD (gastroesophageal reflux disease)     History of diabetes mellitus     History of osteomyelitis     Hx of leg amputation (HCC)     r high upper leg    Hyperlipidemia     Hypertension     Hypospadias     Incomplete bladder emptying     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(HCC) 9/16/2019    Neurogenic bladder     Paralysis (HCC)     Paraplegia (HCC)     Spinal cord cysts     Ulcer of sacral region (HCC)     Urge incontinence        Past Surgical History  Past Surgical History:   Procedure Laterality Date    AMPUTATION      At hip; Last Assessed:  1/19/16    BLADDER SURGERY      BONE RESECTION, RIB Right 1/25/2024    Procedure: RESECTION RIB R CHEST WALL RESECTION/RIB RESECTION/RECONSTRUCTION;  Surgeon: Srikanth Maradiaga MD;  [facility];  Service: Thoracic    COLON SURGERY      llq ostomy pouch    COLOSTOMY      COMPLEX CYSTOMETROGRAM  2014    CT CYSTOGRAM  9/19/2019    CYSTOSCOPY  2014    FL CYSTOGRAM  1/5/2015    FL CYSTOGRAM  11/4/2014    FL CYSTOGRAM  10/24/2014    IR PICC REPOSITION  9/23/2019    IR SUPRAPUBIC CATHETER PLACEMENT  9/19/2019    LEG AMPUTATION      MEATOTOMY      PENILE PROSTHESIS  REMOVAL N/A 11/1/2019    Procedure: REMOVAL PROSTHESIS PENILE;  Surgeon: Cuong Phillips MD;  Location: [facility];  Service: Urology    PENILE PROSTHESIS IMPLANT  2011    PA ADJT/REARRGMT SCALP/ARM/LEG 10.1-30.0 SQ CM Left 5/1/2017    Procedure: POSTERIOR THIGH V-Y ADMANCEMENT;  Surgeon: Gla Cardozo MD;  [facility]: [facility];  Service: Plastics    PA MUSC MYOCUTANEOUS/FASCIOCUTANEOUS FLAP TRUNK Left 5/1/2017    Procedure: FLAP CLOSURE LEFT ISCHIAL WOUND and \"RIGHT\" ISCHIAL FLAP ADVANCEMENT * DEBRIDEMENT, VAC PLACEMENT ;  Surgeon: Gal Cardozo MD;  [facility];  Service: Plastics    PA MUSC MYOCUTANEOUS/FASCIOCUTANEOUS FLAP TRUNK Left 9/27/2017    Procedure: gluteal " myocutaneous rotational flap, posterior thigh v to y advancement- wound 5 x 2.5 x 8;  Surgeon: Gal Cardozo MD;  [facility];  Service: Plastics    DE MUSC MYOCUTANEOUS/FASCIOCUTANEOUS FLAP TRUNK N/A 1/25/2024    Procedure: latissimus flap;  Surgeon: Alessandra Bell MD;  [facility];  Service: Plastics    SPINE SURGERY      Lower back    UROFLOWMETRY SIMPLE / COMPLEX  2014 02/01/24 1130   PT Last Visit   PT Visit Date 02/01/24   Note Type   Note type Evaluation   Pain Assessment   Pain Assessment Tool 0-10   Pain Score 7   Pain Location/Orientation Orientation: Right;Location: Rib Cage   Patient's Stated Pain Goal No pain   Hospital Pain Intervention(s) Repositioned   Restrictions/Precautions   Weight Bearing Precautions Per Order Yes   RUE Weight Bearing Per Order (S)  NWB  (see below for full description of restriction)   Other Precautions Fall Risk;Telemetry;Multiple lines;Pain;Impulsive;Cardiac/sternal   Home Living   Type of Home Apartment   Additional Comments Resides w/ wife in 1st floor apt.  Needs assist w/ all ADLs, self care.  reports has HHA from 8 AM-9 PM that can assist as needed.  Normally can transfer to and from electric w/c w/ aide assist.  normally does this by rolling onyl R side, then onto somach into w/c, then repositioning in seated in w/c   Prior Function   Level of [place] Needs assistance with ADLs;Needs assistance with functional mobility;Needs assistance with IADLS   Lives With Spouse   Receives Help From Home health;Personal care attendant   Falls in the last 6 months   (unknown)   General   Family/Caregiver Present No   Cognition   Overall Cognitive Status WFL   Arousal/Participation Responsive   Orientation Level Oriented X4   Memory Unable to assess   Following Commands Follows one step commands without difficulty   Subjective   Subjective pt primarily Frisian speaking, but does speak/understand most english.  lengthy discussion w/ pt re: baseline level  of function, transfer techniques.   RUE Assessment   RUE Assessment X  (baseline increased time, w/. limited strength distal > proximal)   LUE Assessment   LUE Assessment WFL   RLE Assessment   RLE Assessment   (prior AKA, but also w/ paraplagia, little to no active mvmt)   LLE Assessment   LLE Assessment   (strength grossly 0-1/5 w/ tone changes)   Coordination   Movements are Fluid and Coordinated 0   Bed Mobility   Rolling R 5  Supervision  (despite being told multiple times he is restricted and unable to roll to R- patient tried to initiate rolling to R x 2 to demonstrate baseline setup for transfers to w/c.  Reinforced he is not allowed to rol to or be positoned on R side)   Rolling L 1  Dependent  (attempted to bedform bed mob/rolling to L as this is allowable per plastics, but unable to accomplish same w/ out dependant A.  In questioning pt, states at baseline, never sits unsupported or with A on edge of bed)   Transfers   Additional Comments currently unable/ not allowed to roll to R.  This would be the only direction he could successfully transfer to, so no transfers are performed.   Endurance Deficit   Endurance Deficit Yes   Endurance Deficit Description fatigue, weakness, pain   Activity Tolerance   Activity Tolerance Patient limited by fatigue;Patient limited by pain;Treatment limited secondary to medical complications (Comment)   Nurse Made Aware yes   Assessment   Prognosis Guarded   Assessment Pt seen for limited PT evaluation, but increased time coordinating care with plastics, surgery team, case mgmt, and OT.  Portion of evaluation perfoemed as co-evaluation w/ OT due to med status concerns.  PT portion of evaluation focused on mobility attempts, planning safe d/c, care coordination.  Pt is a 65 y/o male w/ history/comorbidities of paraplegia, cystitis, colostomy, GERD, R AKA, HTN, HTN, sacral wound who is now admitted w/ R chest wall wound, due to sitting posture in w/c, due to leaning on R sided  joystick arm.  Pt also w/ OM R 6th-8th ribs w/ pathologic fx, and underwent excision of R chest wall wound, resection of R 8th rib, latissimus muscle flap and closure 1/25/24.  Due to acute medical issues, acute surgery, pain, fall risk, note unstable clinical picture.  PT consulted to assess mobility, appropriateness for d/c home.  Has been followjng peripherally, as per plastics, pts w/c needs modifications including R sided padding as well as moving joystick from R side to L.  Pt w/ appointment Monday 2/5 with uMentioned here to adjust.  Call placed to [facility], and they are unable to cooprdinate earlier appointment.  Also had been awaiting clarification from plastics and surgery as to what positions or movements pt was unable to perform given flap.  Per tiger connect message today with Dr Bell from plastics- can use L UE but not R UE for balance and strengthening.  Cannot put pressure on R side, roll or lay on R side.  Seen for bedlevel assessment to determine normal transfer techniques at home to determing if pt could perform transfers within the confines of these restrictions.  Per pt, normally rolls onto R side, then onto stomach, then repositions self in chair.  Unable to roll to L physically.  Given baseline and current restrictions, currently cannot perform transfers without disturbing surgical site.  Additional tiger connect message to Dr Fulton to see if mechanical/meg lift would at all disturb surgical site, and picture provided as to position of sling for it's use.  Dr. Fulton feels this would be fine provided he can offload R side .  Current recommendations for home when stable would be home w/ hospital bed/pressure relieving mattress, no OOB to w/c until padding added to R side of w/c per plastics, and obtaining meg lift for transfers as baseline transfer technique is not conducive to the patients flap precautions.  Given need for dependant means for transfers at this time, do not identify  further therapy needs of skill at   Barriers to Discharge   (assessment continued:...this time.  will sign off w/ recommendations as above.  All of above relayed to Omayra from OT. Elena from case mgmt, Dr Bell from plastic surgery and Dr Mills from thoracic surgery.)   Goals   PT Treatment Day 0   Plan   PT Frequency   (d/c PT)   Discharge Recommendation   Rehab Resource Intensity Level, PT No post-acute rehabilitation needs   Equipment Recommended   (hiyer lift, hospital bed w/ pressure relieving mattress, w/c adjustments per plastics)   AM-PAC Basic Mobility Inpatient   Turning in Flat Bed Without Bedrails 4   Lying on Back to Sitting on Edge of Flat Bed Without Bedrails 1   Moving Bed to Chair 1   Standing Up From Chair Using Arms 1   Walk in Room 1   Climb 3-5 Stairs With Railing 1   Basic Mobility Inpatient Raw Score 9   Turning Head Towards Sound 4   Follow Simple Instructions 4   Low Function Basic Mobility Raw Score  17   Low Function Basic Mobility Standardized Score  27.46   Highest Level Of Mobility   JH-HLM Goal 3: Sit at edge of bed   JH-HLM Achieved 2: Bed activities/Dependent transfer             Jules Ogden PT, DPT, CSRS

## 2024-02-01 NOTE — CASE MANAGEMENT
Case Management Discharge Planning Note    Patient name Rikki Arguello  [facility] [id number]  : 1960 Date 2024       Current Admission Date: 2024  Current Admission Diagnosis:Stage IV pressure ulcer of right lower back (HCC)   Patient Active Problem List    Diagnosis Date Noted    Subacute osteomyelitis, other site ([facility]) 2023    Foul smelling urine 2023    Pressure ulcer of left buttock, stage 4 ([facility]) 01/10/2023    Heme positive stool 2022    Open wound of buttock, left, initial encounter 2022    Pressure injury of contiguous region involving back and left buttock, stage 4 ([facility]) 2021    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 ([facility]) 2021    Stage IV pressure ulcer of right lower back ([facility]) 2021    Type 2 diabetes mellitus without complication, without long-term current use of insulin ([facility]) 2021    Renal cyst 2019    Other male erectile dysfunction 2019    Prostate cancer screening 2019    Hypokalemia 10/29/2019    SBO (small bowel obstruction) ([facility]) 10/28/2019    Sepsis ([facility]) 10/28/2019    Stage II pressure ulcer of buttock ([facility]) 2019    Left perineal ischial pressure ulcer, stage IV ([facility]) 2019    Hyperkalemia 2019    History of Clostridium difficile colitis 2019    Neurogenic bladder 2019    Sepsis with hypotension  2019    Osteomyelitis of pelvic region ([facility]) 2019    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[facility]) 2019    Colostomy in place ([facility]) 2019    Colon cancer screening 2019    Dyspepsia 2019    Epigastric pain 2019    Continuous opioid dependence ([facility])     Decubitus ulcer of ischium, left, stage IV ([facility]) 2018    Diarrhea 2018    Amputated right leg ([facility]) 2018    Anxiety 2018    GERD (gastroesophageal reflux disease)     History of osteomyelitis     Cyst of joint of shoulder 10/20/2016    Anemia 10/20/2016    Paraplegic spinal  paralysis (HCC) 10/20/2016    Sinus tachycardia 10/20/2016    Stage IV pressure ulcer of sacral region (HCC) 10/15/2016    Stage IV pressure ulcer of left heel (HCC) 10/15/2016    Diabetes mellitus type II, controlled (HCC) 03/07/2014    Benign essential hypertension 07/31/2013      LOS (days): 15  Geometric Mean LOS (GMLOS) (days): 3.4  Days to GMLOS:-11.6     OBJECTIVE:  Risk of Unplanned Readmission Score: 13.47         Current admission status: Inpatient   Preferred Pharmacy:   [id number]  [place] [address]   [address] [facility] [address]  Phone: [phone] Fax: [phone]    [id number] - [place] - [address]  [address] [facility] [address]  Phone: [phone] Fax: [phone]    Primary Care Provider: Lexy Bo MD    Primary Insurance: MEDICARE  Secondary Insurance: [facility]    DISCHARGE DETAILS:                                                                                        IMM Given (Date):: 02/01/24  IMM Given to:: Patient

## 2024-02-02 VITALS
TEMPERATURE: 98.3 F | RESPIRATION RATE: 18 BRPM | HEIGHT: 67 IN | OXYGEN SATURATION: 97 % | HEART RATE: 82 BPM | WEIGHT: 145 LBS | SYSTOLIC BLOOD PRESSURE: 108 MMHG | DIASTOLIC BLOOD PRESSURE: 70 MMHG | BODY MASS INDEX: 22.76 KG/M2

## 2024-02-02 LAB
GLUCOSE SERPL-MCNC: 108 MG/DL (ref 65–140)
GLUCOSE SERPL-MCNC: 123 MG/DL (ref 65–140)
GLUCOSE SERPL-MCNC: 81 MG/DL (ref 65–140)
GLUCOSE SERPL-MCNC: 97 MG/DL (ref 65–140)

## 2024-02-02 PROCEDURE — 82948 REAGENT STRIP/BLOOD GLUCOSE: CPT

## 2024-02-02 PROCEDURE — 99024 POSTOP FOLLOW-UP VISIT: CPT | Performed by: THORACIC SURGERY (CARDIOTHORACIC VASCULAR SURGERY)

## 2024-02-02 PROCEDURE — NC001 PR NO CHARGE: Performed by: THORACIC SURGERY (CARDIOTHORACIC VASCULAR SURGERY)

## 2024-02-02 RX ORDER — ACETAMINOPHEN 325 MG/1
650 TABLET ORAL EVERY 6 HOURS PRN
Qty: 40 TABLET | Refills: 0 | Status: SHIPPED | OUTPATIENT
Start: 2024-02-02

## 2024-02-02 RX ORDER — OXYCODONE HYDROCHLORIDE 10 MG/1
10 TABLET ORAL EVERY 4 HOURS PRN
Qty: 8 TABLET | Refills: 0 | Status: SHIPPED | OUTPATIENT
Start: 2024-02-02

## 2024-02-02 RX ORDER — METHOCARBAMOL 500 MG/1
500 TABLET, FILM COATED ORAL 4 TIMES DAILY
Qty: 40 TABLET | Refills: 0 | Status: SHIPPED | OUTPATIENT
Start: 2024-02-02 | End: 2024-02-12

## 2024-02-02 RX ORDER — GABAPENTIN 400 MG/1
400 CAPSULE ORAL 3 TIMES DAILY
Qty: 30 CAPSULE | Refills: 0 | Status: SHIPPED | OUTPATIENT
Start: 2024-02-02 | End: 2024-02-12

## 2024-02-02 RX ADMIN — HEPARIN SODIUM 5000 UNITS: 5000 INJECTION INTRAVENOUS; SUBCUTANEOUS at 06:13

## 2024-02-02 RX ADMIN — ASPIRIN 81 MG: 81 TABLET, COATED ORAL at 08:28

## 2024-02-02 RX ADMIN — ACETAMINOPHEN 650 MG: 325 TABLET, FILM COATED ORAL at 13:14

## 2024-02-02 RX ADMIN — OXYCODONE HYDROCHLORIDE 10 MG: 10 TABLET ORAL at 13:14

## 2024-02-02 RX ADMIN — OXYCODONE HYDROCHLORIDE 10 MG: 10 TABLET ORAL at 17:37

## 2024-02-02 RX ADMIN — VANCOMYCIN HYDROCHLORIDE 1000 MG: 1 INJECTION, SOLUTION INTRAVENOUS at 19:27

## 2024-02-02 RX ADMIN — HYDROMORPHONE HYDROCHLORIDE 0.5 MG: 1 INJECTION, SOLUTION INTRAMUSCULAR; INTRAVENOUS; SUBCUTANEOUS at 18:28

## 2024-02-02 RX ADMIN — OXYCODONE HYDROCHLORIDE 10 MG: 10 TABLET ORAL at 06:13

## 2024-02-02 RX ADMIN — HYDROMORPHONE HYDROCHLORIDE 0.5 MG: 1 INJECTION, SOLUTION INTRAMUSCULAR; INTRAVENOUS; SUBCUTANEOUS at 08:28

## 2024-02-02 RX ADMIN — PANTOPRAZOLE SODIUM 40 MG: 40 TABLET, DELAYED RELEASE ORAL at 06:13

## 2024-02-02 RX ADMIN — HEPARIN SODIUM 5000 UNITS: 5000 INJECTION INTRAVENOUS; SUBCUTANEOUS at 13:14

## 2024-02-02 RX ADMIN — HYDROMORPHONE HYDROCHLORIDE 0.5 MG: 1 INJECTION, SOLUTION INTRAMUSCULAR; INTRAVENOUS; SUBCUTANEOUS at 14:54

## 2024-02-02 RX ADMIN — VANCOMYCIN HYDROCHLORIDE 1000 MG: 1 INJECTION, SOLUTION INTRAVENOUS at 08:27

## 2024-02-02 NOTE — PLAN OF CARE
Problem: SAFETY ADULT  Goal: Maintain or return to baseline ADL function  Description: INTERVENTIONS:  -  Assess patient's ability to carry out ADLs; assess patient's baseline for ADL function and identify physical deficits which impact ability to perform ADLs (bathing, care of mouth/teeth, toileting, grooming, dressing, etc.)  - Assess/evaluate cause of self-care deficits   - Assess range of motion  - Assess patient's mobility; develop plan if impaired  - Assess patient's need for assistive devices and provide as appropriate  - Encourage maximum independence but intervene and supervise when necessary  - Involve family in performance of ADLs  - Assess for home care needs following discharge   - Consider OT consult to assist with ADL evaluation and planning for discharge  - Provide patient education as appropriate  Outcome: Progressing     Problem: INFECTION - ADULT  Goal: Absence or prevention of progression during hospitalization  Description: INTERVENTIONS:  - Assess and monitor for signs and symptoms of infection  - Monitor lab/diagnostic results  - Monitor all insertion sites, i.e. indwelling lines, tubes, and drains  - Monitor endotracheal if appropriate and nasal secretions for changes in amount and color  - [place] appropriate cooling/warming therapies per order  - Administer medications as ordered  - Instruct and encourage patient and family to use good hand hygiene technique  - Identify and instruct in appropriate isolation precautions for identified infection/condition  Outcome: Progressing

## 2024-02-02 NOTE — DISCHARGE INSTR - AVS FIRST PAGE
Thoracic Surgery Discharge Instructions    Please follow-up as instructed. If you do not already have a follow-up appointment, please call the office when you leave to schedule an appointment to be seen in 2-3 weeks for post-operative re-evaluation.    The patient will need to follow-up with plastic surgery in 1 week. The patient will also need to follow up with the infectious disease team in 2 weeks. He is to continue to take his IV antibiotic.    The [employer] team will come on 2/5 to evaluate the patient for a new wheelchair system to assist with transfers.     If the patient has any questions regarding his drains, he is to contact the plastic surgery team. Any questions regarding antibiotics, he is to call the infectious disease team.     Activity:  - Patient to remain bed bound until evaluated by the [facility] specialists  - Please followup with plastic surgery for final activity recommendations    Diet:    - You may resume your normal diet.    Wound Care:  - See wound care instructions provided by the wound care nursing team  - Do not apply any creams or ointments unless instructed to do so by your surgeon.  - You may apply ice as needed (no longer than 20 minutes at a time) for the first 48 hours.  - Bruising is not unusual and will go away with a little time. You may apply a warm, moist compress that may help the bruising resolve quicker.    Medications:    - You may resume all of your regular medications after discharge unless otherwise instructed. Please refer to your discharge medication list for further details.  - Please take the pain medications as directed.  - You are encouraged to use non-narcotic pain medications first and whenever possible. Reserve the use of narcotic pain medication for moderate to severe pain not controlled by non-narcotic medications.  - You may become constipated, especially if taking pain medications. You may take any over the counter stool softeners or laxatives as  needed. Examples: Milk of Magnesia, Colace, Senna.    Additional Instructions:  - If you have any questions or concerns after discharge please call the office.  - Call office or return to ER if fever greater than 101, chills, persistent nausea/vomiting, worsening/uncontrollable pain, and/or increasing redness or purulent/foul smelling drainage from incision(s).

## 2024-02-02 NOTE — PROGRESS NOTES
Rikki Arguello is a 64 y.o. male who is currently ordered Vancomycin IV with management by the Pharmacy Consult service.  Relevant clinical data and objective / subjective history reviewed.  Vancomycin Assessment:  Indication and Goal AUC/Trough: Bone/joint infection (goal -600, trough >10), MRSA, -600, trough >10  Clinical Status: stable  Micro:     Renal Function:  SCr: 0.35 mg/dL  CrCl: 201 mL/min  Renal replacement: Not on dialysis  Days of Therapy: 5  Current Dose: 1gm q12  Vancomycin Plan:  New Dosing: vanco  1gm q12hrs  Estimated AUC: 409 mcg*hr/mL  Estimated Trough: 11.5 mcg/mL  Next Level: 2/7 0600  Continue for 6 weeks. Through 3/11/24  Renal Function Monitoring: Daily BMP and UOP  Pharmacy will continue to follow closely for s/sx of nephrotoxicity, infusion reactions and appropriateness of therapy.  BMP and CBC will be ordered per protocol. We will continue to follow the patient’s culture results and clinical progress daily.    Ally Molina, Pharmacist

## 2024-02-02 NOTE — PROGRESS NOTES
"Thoracic Surgery  Progress Note   Rikki Farooqcat 64 y.o. male MRN: [id number]  Unit/Bed#: [facility] 402-01 Encounter: [id number]    Assessment:  64-year-old male with chronic chest wound and rib osteomyelitis.    Status post  resection of eighth rib osteomyelitis, latissimus flap closure.    History of paraplegia, colostomy, right leg amputation, decubitus ulcer.    AVSS on room air  UOP: 1770 cc  BALTAZAR drainx3 75 cc serosanguineous    Plan:  -Continue Aubrey drains to suction, plastic surgery managing  -Aggressive pulmonary toilet  -Nutrition  -Per CM: [facility] to deliver meds here 24, will receive second dose of Vancomycin at , ambulance requested for 2100 to home.  -[facility] specialist will see pt at home on 24 to assess wheelchair/equipment.  -Pain and nausea control PRN  -Encourage IS  -DVT ppx   -Disposition: DC today        Subjective/Objective     Subjective:   Patient seen and examined at bedside, in no acute distress. No acute events overnight.  Patient denies any pain patient reports he is tolerating a diet.  Patient is having bowel movements.  Patient denies nausea vomiting fevers chills chest pain or shortness of breath.    Objective:   Vitals:Blood pressure 122/76, pulse 79, temperature 98.6 °F (37 °C), resp. rate 16, height 5' 7\" (1.702 m), weight 65.8 kg (145 lb), SpO2 99%.  Temp (24hrs), Av.5 °F (36.9 °C), Min:98.3 °F (36.8 °C), Max:98.6 °F (37 °C)      I/O          0701   0700  0701   0700    P.O. 780 120    NG/GT  0    IV Piggyback 500 400    Total Intake(mL/kg) 1280 (19.5) 520 (7.9)    Urine (mL/kg/hr) 3000 (1.9) 1420 (0.9)    Drains 145 75    Total Output 3145 1495    Net -1865 -975          Unmeasured Urine Occurrence  400 x            Invasive Devices       Peripherally Inserted Central Catheter Line  Duration             PICC Line 24 Left Basilic 3 days              Drain  Duration             Colostomy LLQ 15 days    Suprapubic Catheter 15 " days    Closed/Suction Drain Lateral RUQ Bulb 15 Fr. 7 days    Closed/Suction Drain Lateral RUQ Bulb 15 Fr. 7 days    Closed/Suction Drain Lateral RUQ Bulb 15 Fr. 7 days                    Physical Exam:  General: No acute distress  Neuro: Awake, Alert and Oriented x 3  HEENT:  Normocephalic, atraumatic, moist mucous membranes  CV: Regular rate and rhythm  Chest: BALTAZAR serosanguineous output, nontender, no fluctuance or crepitus  Lungs: Normal work of breathing, no increased respiratory effort  Abdomen: Soft, non-tender, non-distended.  Ostomy  Extremities: No edema, clubbing or cyanosis  Skin: Warm, dry    Lab Results   Component Value Date    WBC 5.76 01/28/2024    HGB 8.1 (L) 01/28/2024    HCT 28.0 (L) 01/28/2024    MCV 79 (L) 01/28/2024     01/28/2024      Lab Results   Component Value Date     02/17/2014    SODIUM 135 01/31/2024    K 3.7 01/31/2024     01/31/2024    CO2 26 01/31/2024    ANIONGAP 8 02/17/2014    AGAP 6 01/31/2024    BUN 19 01/31/2024    CREATININE 0.35 (L) 01/31/2024    GLUC 72 01/31/2024    GLUF 62 (L) 08/29/2023    CALCIUM 8.6 01/31/2024    AST 11 (L) 01/18/2024    ALT 7 01/18/2024    ALKPHOS 77 01/18/2024    PROT 8.0 02/17/2014    TP 8.6 (H) 01/18/2024    BILITOT 0.3 02/17/2014    TBILI 0.39 01/18/2024    EGFR 133 01/31/2024       VTE Prophylaxis: Heparin      Akhil Mills MD  2/2/2024  6:28 AM

## 2024-02-02 NOTE — DISCHARGE SUMMARY
Discharge Summary - Rikki Arguello 64 y.o. male MRN: [id number]    Unit/Bed#: [facility] 402-01 Encounter: [id number]    Admission Date:   Admission Orders (From admission, onward)       Ordered        01/17/24 1635  INPATIENT ADMISSION  Once                            Admitting Diagnosis: Rib pain [R07.81]  Multiple drug resistant organism (MDRO) culture positive [Z16.24]  Other chronic osteomyelitis, other site (HCC) [M86.68]    HPI: Rikki Arguello is a 64 y.o. male who presents with a right chest wall wound. The wound has been managed by Dr. Yan in the outpatient setting. Of note, the patient has had paralysis for a long period of time and his right chest wall wound is 2/2 the way his chest wall is in contact with his wheelchair. On 12/14 the patient had a CT scan of the chest with contrast which demonstrated: right chest wall wound with concern for osteomyelitis of the right 6/7/8 ribs along with a pathologic fracture. PMHX includes chronic cystitis, colostomy, DM, GERD, Right lower extremity amputation, HLD, HTN, paralysis, sacral wound. Patient presented to the ED afebrile with stable vitals. No I and Os recorded. WBC is 7.7. Hgb is 9.9. Cr is 0.36. Blood cultures were obtained and are pending. Wound culture also obtained. See above for assessment and plan.      Procedures Performed: No orders of the defined types were placed in this encounter.      Summary of Hospital Course: Patient was admitted on 1/17 and underwent excision of right chest wall wound, resection of the 8th rib, and latissimus dorsi muscle flap wound and closure as a joint case with the Thoracic team and Plastic Surgery team on 1/25. There were no intra-operative complications. The patient was seen by infectious disease while inpatient for further assessment and recommendations on antibiotic management. Discharge planning included the following: Per ID recommendations, the patient will continue IV vanc for a total of 6 weeks through 3/11.  The patient will follow up with the infectious disease team in 2 weeks after discharge. The patient will be [facility] by [facility] on [facility]/5 for further assessment of his wheelchair given his original wound was 2/2 a pressure wound. Plastic surgery will manage the patients flap and his drains. He will be discharged with 3 drains. His KULWINDER dressing was already removed before. Will need to follow up with plastic surgery on discharge. The patient will go back to his originally facility. Patient has aids at home to help with ambulation. Patient is to remain bedbound until he is evaluated by [facility] specialist. Patient will have [facility] to help with drain management and transfers. The plan was communicated with the patient.     Significant Findings, Care, Treatment and Services Provided: See above    Complications: None    Discharge Diagnosis: Right chest wall osteomyelitis    Medical Problems       Resolved Problems  Date Reviewed: 8/17/2023   None         Condition at Discharge: good         Discharge instructions/Information to patient and family:   See after visit summary for information provided to patient and family.      Provisions for Follow-Up Care:  See after visit summary for information related to follow-up care and any pertinent home health orders.      PCP: Lexy Bo MD    Disposition: Extended care facility      Planned Readmission: No      Discharge Statement   I spent 30 minutes discharging the patient. This time was spent on the day of discharge. I had direct contact with the patient on the day of discharge. Additional documentation is required if more than 30 minutes were spent on discharge.     Discharge Medications:  See after visit summary for reconciled discharge medications provided to patient and family.

## 2024-02-03 ENCOUNTER — HOME CARE VISIT (OUTPATIENT)
Dept: HOME HEALTH SERVICES | Facility: HOME HEALTHCARE | Age: 64
End: 2024-02-03
Payer: MEDICARE

## 2024-02-03 VITALS
RESPIRATION RATE: 16 BRPM | DIASTOLIC BLOOD PRESSURE: 78 MMHG | HEART RATE: 69 BPM | TEMPERATURE: 98.3 F | SYSTOLIC BLOOD PRESSURE: 120 MMHG | OXYGEN SATURATION: 98 %

## 2024-02-03 PROCEDURE — 10330081 VN NO-PAY CLAIM PROCEDURE

## 2024-02-03 PROCEDURE — G0299 HHS/HOSPICE OF RN EA 15 MIN: HCPCS

## 2024-02-03 PROCEDURE — 400013 VN SOC

## 2024-02-04 ENCOUNTER — HOME CARE VISIT (OUTPATIENT)
Dept: HOME HEALTH SERVICES | Facility: HOME HEALTHCARE | Age: 64
End: 2024-02-04
Payer: MEDICARE

## 2024-02-04 VITALS
TEMPERATURE: 98 F | RESPIRATION RATE: 16 BRPM | SYSTOLIC BLOOD PRESSURE: 120 MMHG | OXYGEN SATURATION: 98 % | DIASTOLIC BLOOD PRESSURE: 80 MMHG | HEART RATE: 72 BPM

## 2024-02-04 PROCEDURE — G0299 HHS/HOSPICE OF RN EA 15 MIN: HCPCS

## 2024-02-05 ENCOUNTER — HOME CARE VISIT (OUTPATIENT)
Dept: HOME HEALTH SERVICES | Facility: HOME HEALTHCARE | Age: 64
End: 2024-02-05
Payer: MEDICARE

## 2024-02-05 ENCOUNTER — TELEPHONE (OUTPATIENT)
Dept: INFECTIOUS DISEASES | Facility: CLINIC | Age: 64
End: 2024-02-05

## 2024-02-05 VITALS — RESPIRATION RATE: 16 BRPM

## 2024-02-05 DIAGNOSIS — M86.9 OSTEOMYELITIS (HCC): Primary | ICD-10-CM

## 2024-02-05 LAB
FUNGUS SPEC CULT: NORMAL
FUNGUS SPEC CULT: NORMAL

## 2024-02-05 PROCEDURE — 10330064 DRESSING, XEROFORM STR 5"X9" (50/BX)

## 2024-02-05 PROCEDURE — 10330064 PAD, ABD 5X9" STR LF (1/PK 20PK/BX) MGM1

## 2024-02-05 PROCEDURE — G0299 HHS/HOSPICE OF RN EA 15 MIN: HCPCS

## 2024-02-05 PROCEDURE — 10330064 SPONGE, GAUZE 8PLY N/S 4"X4" (200/PK 20P

## 2024-02-05 PROCEDURE — 10330064 TAPE, RETENTION 2"X10YDS (1/BX24BX/CS)

## 2024-02-05 NOTE — PROGRESS NOTES
OPAT NOTE    Supervising/Discharge physician: Umm       Diagnosis: Chronic osteomyelitis of ribs (right 6-8)    Drug:Vancomycin     Dose/Frequency:1gQ12    Labs/Frequency: CBCD BMP VT weekly     End Date: 3/11    Vanco Trough Range: 15-20    Infusion/[facility] contact: Lamar/FLORENCIO    Next appointment:2/13        MA assigned: Emmy

## 2024-02-05 NOTE — TELEPHONE ENCOUNTER
Called and spoke with patient today.     Went over antibiotic plan, weekly labs and follow up appointment with patient today. Patient states he doses vancomycin at 10am and 10pm.   Informed patient if he has any further questions or concerns to call the office.   Patient verbalizes understanding at this time.

## 2024-02-06 ENCOUNTER — HOME CARE VISIT (OUTPATIENT)
Dept: HOME HEALTH SERVICES | Facility: HOME HEALTHCARE | Age: 64
End: 2024-02-06
Payer: MEDICARE

## 2024-02-07 ENCOUNTER — HOME CARE VISIT (OUTPATIENT)
Dept: HOME HEALTH SERVICES | Facility: HOME HEALTHCARE | Age: 64
End: 2024-02-07
Payer: MEDICARE

## 2024-02-07 ENCOUNTER — TELEPHONE (OUTPATIENT)
Dept: INFECTIOUS DISEASES | Facility: CLINIC | Age: 64
End: 2024-02-07

## 2024-02-07 ENCOUNTER — LAB REQUISITION (OUTPATIENT)
Dept: LAB | Facility: HOSPITAL | Age: 64
End: 2024-02-07
Payer: MEDICARE

## 2024-02-07 DIAGNOSIS — M86.9 OSTEOMYELITIS, UNSPECIFIED (HCC): ICD-10-CM

## 2024-02-07 LAB
ANION GAP SERPL CALCULATED.3IONS-SCNC: 6 MMOL/L
BASOPHILS # BLD AUTO: 0.08 THOUSANDS/ÂΜL (ref 0–0.1)
BASOPHILS NFR BLD AUTO: 1 % (ref 0–1)
BUN SERPL-MCNC: 20 MG/DL (ref 5–25)
CALCIUM SERPL-MCNC: 8.8 MG/DL (ref 8.4–10.2)
CHLORIDE SERPL-SCNC: 105 MMOL/L (ref 96–108)
CO2 SERPL-SCNC: 24 MMOL/L (ref 21–32)
CREAT SERPL-MCNC: 0.44 MG/DL (ref 0.6–1.3)
EOSINOPHIL # BLD AUTO: 0.43 THOUSAND/ÂΜL (ref 0–0.61)
EOSINOPHIL NFR BLD AUTO: 6 % (ref 0–6)
ERYTHROCYTE [DISTWIDTH] IN BLOOD BY AUTOMATED COUNT: 18.6 % (ref 11.6–15.1)
GFR SERPL CREATININE-BSD FRML MDRD: 121 ML/MIN/1.73SQ M
GLUCOSE P FAST SERPL-MCNC: 74 MG/DL (ref 65–99)
HCT VFR BLD AUTO: 30 % (ref 36.5–49.3)
HGB BLD-MCNC: 8.7 G/DL (ref 12–17)
IMM GRANULOCYTES # BLD AUTO: 0.01 THOUSAND/UL (ref 0–0.2)
IMM GRANULOCYTES NFR BLD AUTO: 0 % (ref 0–2)
LYMPHOCYTES # BLD AUTO: 1.69 THOUSANDS/ÂΜL (ref 0.6–4.47)
LYMPHOCYTES NFR BLD AUTO: 24 % (ref 14–44)
MCH RBC QN AUTO: 22.8 PG (ref 26.8–34.3)
MCHC RBC AUTO-ENTMCNC: 29 G/DL (ref 31.4–37.4)
MCV RBC AUTO: 79 FL (ref 82–98)
MONOCYTES # BLD AUTO: 0.61 THOUSAND/ÂΜL (ref 0.17–1.22)
MONOCYTES NFR BLD AUTO: 9 % (ref 4–12)
NEUTROPHILS # BLD AUTO: 4.33 THOUSANDS/ÂΜL (ref 1.85–7.62)
NEUTS SEG NFR BLD AUTO: 60 % (ref 43–75)
NRBC BLD AUTO-RTO: 0 /100 WBCS
PLATELET # BLD AUTO: 418 THOUSANDS/UL (ref 149–390)
PMV BLD AUTO: 10.5 FL (ref 8.9–12.7)
POTASSIUM SERPL-SCNC: 4.1 MMOL/L (ref 3.5–5.3)
RBC # BLD AUTO: 3.81 MILLION/UL (ref 3.88–5.62)
SODIUM SERPL-SCNC: 135 MMOL/L (ref 135–147)
VANCOMYCIN TROUGH SERPL-MCNC: 13.8 UG/ML (ref 10–20)
WBC # BLD AUTO: 7.15 THOUSAND/UL (ref 4.31–10.16)

## 2024-02-07 PROCEDURE — 80048 BASIC METABOLIC PNL TOTAL CA: CPT | Performed by: INTERNAL MEDICINE

## 2024-02-07 PROCEDURE — G0299 HHS/HOSPICE OF RN EA 15 MIN: HCPCS

## 2024-02-07 PROCEDURE — 80202 ASSAY OF VANCOMYCIN: CPT | Performed by: INTERNAL MEDICINE

## 2024-02-07 PROCEDURE — 85025 COMPLETE CBC W/AUTO DIFF WBC: CPT | Performed by: INTERNAL MEDICINE

## 2024-02-07 NOTE — TELEPHONE ENCOUNTER
Called [facility] and spoke with Miguel today.   Informed Miguel per Dr. Salomon will continue current antibiotic regimen.   Miguel verbalizes understanding at this time.

## 2024-02-08 ENCOUNTER — OFFICE VISIT (OUTPATIENT)
Dept: PLASTIC SURGERY | Facility: CLINIC | Age: 64
End: 2024-02-08

## 2024-02-08 DIAGNOSIS — Z09 FOLLOW-UP FOR PLASTIC SURGERY: ICD-10-CM

## 2024-02-08 DIAGNOSIS — S88.911A AMPUTATED RIGHT LEG (HCC): ICD-10-CM

## 2024-02-08 DIAGNOSIS — G82.20 PARAPLEGIC SPINAL PARALYSIS (HCC): Primary | ICD-10-CM

## 2024-02-08 PROCEDURE — 99024 POSTOP FOLLOW-UP VISIT: CPT

## 2024-02-08 NOTE — PROGRESS NOTES
[facility] Plastic and Reconstructive Surgery  [address], [address], [place] [address]  [phone]    Patient Identification: Rikki Arguello is a 64 y.o. male     History of Present Illness: The patient is a 64 y.o.  year-old male  who presents to the office for post-op visit. Patient is 14 days s/p Resection Rib R Chest Wall Resection/rib Resection/reconstruction - Right and latissimus flap  on 1/25/2024 by Dr. Bell and Dr. Maradiaga.   Patient is accompanied by his caretaker. He states one of his drains of the right side fell out. Patient continues to use wheelchair to ambulate. States the wheelchair company has not been in contact with him regarding manipulation of wheelchair setup. Pt states his pain is well controlled. He denies fevers, chills or signs of infection. Remaining drains are outputting 20cc and 50cc daily. Home health is coming three times a week. Conducting dressing changes daily of xeroform, bacitracin and ABD pads.  Patient has no complaints at this time.    Past Medical History:   Diagnosis Date    Anemia     Blind     r eye    Chronic cystitis     Colostomy in place ([facility])     Detrusor sphincter dyssynergia     Diabetes mellitus ([facility])     Poorly controlled type 2; Last Assessed:  3/18/14    Erectile dysfunction     Frequency of urination     GERD (gastroesophageal reflux disease)     History of diabetes mellitus     History of osteomyelitis     Hx of leg amputation ([facility])     r high upper leg    Hyperlipidemia     Hypertension     Hypospadias     Incomplete bladder emptying     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([facility]) 9/16/2019    Neurogenic bladder     Paralysis ([facility])     Paraplegia ([facility])     Spinal cord cysts     Ulcer of sacral region ([facility])     Urge incontinence         Review of Systems  Constitutional: Denies fevers, chills or pain.  Skin: Denies any warmth, erythema, edema or mucopurulent drainage.     Physical Exam    Right trunk/Back: Incision site is healing well. No signs of  infection or dehiscence. Remaining drains have suction in tact, serosanguinous fluid. In bulbs.      Assessment and Plan:  The patient is an 64 y.o.  year-old male who presents to the office for post-op visit. Patient is 14 days s/p Resection Rib R Chest Wall Resection/rib Resection/reconstruction - Right and latissimus flap  on 1/25/2024 by Dr. Bell and Dr. Maradiaga    -At today's visit incisions examined. Healing well with no concerns.  -Reviewed post-op restrictions with patient. Continue to sleep on left side. Avoid pressure to the right trunk. Continue to utilize ABD pads and foam padding to avoid direct pressure.   -Home health/patient care taker to conduct daily: bacitracin to the incision site, ABD pads for protection. Stabilization of drain bases with 2x2 gauze and Tegaderm.  -Continue to record drain output daily.  -Will contact  to discuss wheelchair accommodations.   -The patient is to return in 1 week for drain check.  -The patient is to call the office with any questions or concerns. All of the patient's questions were answered at this time and they agree with the plan of care.      Rhona Jiménez PA-C  [facility] Plastic and Reconstructive Surgery

## 2024-02-09 ENCOUNTER — HOME CARE VISIT (OUTPATIENT)
Dept: HOME HEALTH SERVICES | Facility: HOME HEALTHCARE | Age: 64
End: 2024-02-09
Payer: MEDICARE

## 2024-02-09 PROCEDURE — G0299 HHS/HOSPICE OF RN EA 15 MIN: HCPCS

## 2024-02-11 VITALS
OXYGEN SATURATION: 97 % | DIASTOLIC BLOOD PRESSURE: 70 MMHG | RESPIRATION RATE: 18 BRPM | TEMPERATURE: 96.9 F | SYSTOLIC BLOOD PRESSURE: 110 MMHG | HEART RATE: 78 BPM

## 2024-02-11 VITALS
DIASTOLIC BLOOD PRESSURE: 70 MMHG | HEART RATE: 82 BPM | TEMPERATURE: 97.1 F | RESPIRATION RATE: 20 BRPM | OXYGEN SATURATION: 97 % | SYSTOLIC BLOOD PRESSURE: 118 MMHG

## 2024-02-12 ENCOUNTER — HOME CARE VISIT (OUTPATIENT)
Dept: HOME HEALTH SERVICES | Facility: HOME HEALTHCARE | Age: 64
End: 2024-02-12
Payer: MEDICARE

## 2024-02-12 ENCOUNTER — TELEPHONE (OUTPATIENT)
Dept: INFECTIOUS DISEASES | Facility: CLINIC | Age: 64
End: 2024-02-12

## 2024-02-12 ENCOUNTER — TELEPHONE (OUTPATIENT)
Age: 64
End: 2024-02-12

## 2024-02-12 LAB
FUNGUS SPEC CULT: NORMAL
FUNGUS SPEC CULT: NORMAL

## 2024-02-12 PROCEDURE — G0299 HHS/HOSPICE OF RN EA 15 MIN: HCPCS

## 2024-02-12 PROCEDURE — G0180 MD CERTIFICATION HHA PATIENT: HCPCS | Performed by: INTERNAL MEDICINE

## 2024-02-12 NOTE — TELEPHONE ENCOUNTER
Nicol with [facility] alls in and states she went out to get labs on pt but was not able to get a blood return also pt explains he is a hard stick. Site looks good no redness or swelling. Nicol is wondering what provider would like her to do at this time

## 2024-02-12 NOTE — TELEPHONE ENCOUNTER
Using [employer]  Erin #[id number], contacted patient.    Let him know regarding appointment tomorrow, would like to change visit to virtual. Office will be closed. Patient agrees to this. He will connect via [employer]

## 2024-02-13 ENCOUNTER — TELEMEDICINE (OUTPATIENT)
Dept: INFECTIOUS DISEASES | Facility: CLINIC | Age: 64
End: 2024-02-13
Payer: MEDICARE

## 2024-02-13 DIAGNOSIS — M86.30 CHRONIC MULTIFOCAL OSTEOMYELITIS, UNSPECIFIED SITE (HCC): Primary | ICD-10-CM

## 2024-02-13 DIAGNOSIS — S21.109A OPEN CHEST WOUND: ICD-10-CM

## 2024-02-13 DIAGNOSIS — E11.22 CONTROLLED TYPE 2 DIABETES MELLITUS WITH STAGE 1 CHRONIC KIDNEY DISEASE, UNSPECIFIED WHETHER LONG TERM INSULIN USE: ICD-10-CM

## 2024-02-13 DIAGNOSIS — N18.1 CONTROLLED TYPE 2 DIABETES MELLITUS WITH STAGE 1 CHRONIC KIDNEY DISEASE, UNSPECIFIED WHETHER LONG TERM INSULIN USE: ICD-10-CM

## 2024-02-13 DIAGNOSIS — G82.20 PARAPLEGIC SPINAL PARALYSIS (HCC): ICD-10-CM

## 2024-02-13 PROCEDURE — 99214 OFFICE O/P EST MOD 30 MIN: CPT | Performed by: INTERNAL MEDICINE

## 2024-02-13 NOTE — PROGRESS NOTES
Virtual Regular Visit    Verification of patient location:    Patient is located at Home in the following state in which I hold an active license [place]      Assessment/Plan:  Chronic osteomyelitis of right ribs 6 through 8.  Etiology of osteomyelitis is still chronically nonhealing right chest wall decubitus wound.  Patient is status post I&D and partial resection of eighth rib, with latissimus muscle flap and wound closure.  Operative culture grew MRSA.  Patient is doing well on IV antibiotic.  He had 2 drains in place, one fell out.  The other drain is nonpurulent, per patient.  All blood work, including a vancomycin level, acceptable.  Continue IV vancomycin.    Treat x 6 weeks total, through 3/11.    Chronic nonhealing right chest wall decubitus ulcer, likely secondary to lack of padding of the wheelchair.  This problem has been fixed.  Continue offloading right chest wall.    Longstanding paraplegia, wheelchair bound, with multiple prior decubitus ulcers, including in right lower leg resulting in right AKA.    DM, reasonably well-controlled.  This can contribute to poor wound healing and infection above.  Management per PCP.    Discussed with patient in detail regarding the above plan.  Follow-up with us in 2 weeks, ideally in person, as long as weather cooperates.    Problem List Items Addressed This Visit    None           Reason for visit is follow-up of right rib osteomyelitis.    Encounter provider Meet Salomon MD    Provider located at [facility]  [address] [place] [id number]  [phone]      Recent Visits  Date Type Provider Dept   02/12/24 Telephone Omayra Vazquez [facility] [place]   02/07/24 Telephone Emmy Barone [facility] [place]   Showing recent visits within past 7 days and meeting all other requirements  Today's Visits  Date Type Provider Dept   02/13/24 Telemedicine Meet Salomon MD [facility] [place]   Showing today's visits and meeting all other requirements  Future Appointments  No visits were found meeting these conditions.  Showing future appointments within next 150 days and meeting all other requirements       The patient was identified by name and date of birth. Rikki Arguello was informed that this is a telemedicine visit and that the visit is being conducted through the Epic Embedded platform. He agrees to proceed..  My office door was closed. No one else was in the room.  He acknowledged consent and understanding of privacy and security of the video platform. The patient has agreed to participate and understands they can discontinue the visit at any time.    Patient is aware this is a billable service.     Subjective  Rikki Arguello is a 64 y.o. male, with recent hospitalization for osteomyelitis of the right ribs 6 through 8, status post I&D, rib resection and flap closure.  He is doing well.  Pain in right chest wall is much improved..  1 drain fell out.  The second 1 is doing, without purulence.  No fever or chills.  He is tolerating antibiotic well.  No nausea, vomiting or diarrhea.    Past Medical History:   Diagnosis Date    Anemia     Blind     r eye    Chronic cystitis     Colostomy in place ([facility])     Detrusor sphincter dyssynergia     Diabetes mellitus ([facility])     Poorly controlled type 2; Last Assessed:  3/18/14    Erectile dysfunction     Frequency of urination     GERD (gastroesophageal reflux disease)     History of diabetes mellitus     History of osteomyelitis     Hx of leg amputation ([facility])     r high upper leg    Hyperlipidemia     Hypertension     Hypospadias     Incomplete bladder emptying     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([facility]) 9/16/2019    Neurogenic bladder     Paralysis ([facility])     Paraplegia ([facility])     Spinal cord cysts     Ulcer of sacral region ([facility])     Urge incontinence        Past Surgical History:   Procedure Laterality Date    AMPUTATION      At hip; Last  "Assessed:  1/19/16    BLADDER SURGERY      BONE RESECTION, RIB Right 1/25/2024    Procedure: RESECTION RIB R CHEST WALL RESECTION/RIB RESECTION/RECONSTRUCTION;  Surgeon: Srikanth Maradiaga MD;  Location: [facility];  Service: Thoracic    COLON SURGERY      llq ostomy pouch    COLOSTOMY      COMPLEX CYSTOMETROGRAM  2014    CT CYSTOGRAM  9/19/2019    CYSTOSCOPY  2014    FL CYSTOGRAM  1/5/2015    FL CYSTOGRAM  11/4/2014    FL CYSTOGRAM  10/24/2014    IR PICC REPOSITION  9/23/2019    IR SUPRAPUBIC CATHETER PLACEMENT  9/19/2019    LEG AMPUTATION      MEATOTOMY      PENILE PROSTHESIS  REMOVAL N/A 11/1/2019    Procedure: REMOVAL PROSTHESIS PENILE;  Surgeon: Cuong Phillips MD;  Location: [facility];  Service: Urology    PENILE PROSTHESIS IMPLANT  2011    KY ADJT/REARRGMT SCALP/ARM/LEG 10.1-30.0 SQ CM Left 5/1/2017    Procedure: POSTERIOR THIGH V-Y ADMANCEMENT;  Surgeon: Gal Cardozo MD;  Location: [facility];  Service: Plastics    KY MUSC MYOCUTANEOUS/FASCIOCUTANEOUS FLAP TRUNK Left 5/1/2017    Procedure: FLAP CLOSURE LEFT ISCHIAL WOUND and \"RIGHT\" ISCHIAL FLAP ADVANCEMENT * DEBRIDEMENT, VAC PLACEMENT ;  Surgeon: Gal Cardozo MD;  Location: [facility];  Service: Plastics    KY MUSC MYOCUTANEOUS/FASCIOCUTANEOUS FLAP TRUNK Left 9/27/2017    Procedure: gluteal myocutaneous rotational flap, posterior thigh v to y advancement- wound 5 x 2.5 x 8;  Surgeon: Gal Cardozo MD;  [facility]: [facility];  Service: Plastics    KY MUSC MYOCUTANEOUS/FASCIOCUTANEOUS FLAP TRUNK N/A 1/25/2024    Procedure: latissimus flap;  Surgeon: Alessandra Bell MD;  Location: [facility];  Service: Plastics    SPINE SURGERY      Lower back    UROFLOWMETRY SIMPLE / COMPLEX  2014       Current Outpatient Medications   Medication Sig Dispense Refill    Accu-Chek FastClix Lancets MISC Inject under the skin daily 100 each 0    acetaminophen (TYLENOL) 325 mg tablet Take 2 tablets (650 mg total) by mouth every 6 (six) hours as needed for mild pain 40 " tablet 0    aspirin (RA Aspirin EC) 81 mg EC tablet Take 1 tablet (81 mg total) by mouth daily 90 tablet 0    Blood Glucose Monitoring Suppl (ONE TOUCH ULTRA 2) w/Device KIT Use daily 100 kit 0    Disposable Gloves (LATEX GLOVES LARGE) MISC Use as needed up to 3 times a day dispo 2 boxes 2 each 11    gabapentin (NEURONTIN) 400 mg capsule Take 1 capsule (400 mg total) by mouth 3 (three) times a day for 10 days 30 capsule 0    glucose blood (Accu-Chek Guide) test strip Use 1 each daily Use as instructed 100 each 0    ibuprofen (MOTRIN) 800 mg tablet Take 1 tablet (800 mg total) by mouth every 8 (eight) hours as needed for mild pain 60 tablet 0    Incontinence Supply Disposable (SUNBEAM INCONTINENT UNDER PAD) MISC Use 1 per week, dispense 4 reusable underpads 4 each 11    Incontinence Supply Disposable MISC by Does not apply route 2 (two) times a day 60 each 11    methocarbamol (ROBAXIN) 500 mg tablet Take 1 tablet (500 mg total) by mouth 4 (four) times a day for 10 days 40 tablet 0    oxyCODONE (ROXICODONE) 10 MG TABS Take 1 tablet (10 mg total) by mouth every 4 (four) hours as needed for moderate pain for up to 8 doses Max Daily Amount: 60 mg 8 tablet 0    Sodium Chloride Flush (NORMAL SALINE FLUSH IV) Inject 10 mL into a catheter in a vein every 12 (twelve) hours. EVERY 12HR BEFORE AND AFTER IV VANCO INFUSION.  Indications: FLUSH.      vancomycin (VANCOCIN) 1 g Inject 1 g into a catheter in a vein every 12 (twelve) hours. VANCOMYCIN HCL 1000MG/100ML ECLIPSE BALL OVER 60MIN.   Indications: OSTEO.       No current facility-administered medications for this visit.        No Known Allergies    Review of Systems   All other systems reviewed and are negative.      Video Exam    There were no vitals filed for this visit.    Physical Exam     General: Well-appearing.    Visit Time  Total Visit Duration: 30 minutes.

## 2024-02-14 ENCOUNTER — HOME CARE VISIT (OUTPATIENT)
Dept: HOME HEALTH SERVICES | Facility: HOME HEALTHCARE | Age: 64
End: 2024-02-14
Payer: MEDICARE

## 2024-02-14 PROCEDURE — G0299 HHS/HOSPICE OF RN EA 15 MIN: HCPCS

## 2024-02-15 ENCOUNTER — HOSPITAL ENCOUNTER (EMERGENCY)
Facility: HOSPITAL | Age: 64
Discharge: HOME | End: 2024-02-15
Attending: EMERGENCY MEDICINE | Admitting: EMERGENCY MEDICINE
Payer: MEDICARE

## 2024-02-15 ENCOUNTER — APPOINTMENT (EMERGENCY)
Dept: RADIOLOGY | Facility: HOSPITAL | Age: 64
End: 2024-02-15
Payer: MEDICARE

## 2024-02-15 VITALS
OXYGEN SATURATION: 97 % | DIASTOLIC BLOOD PRESSURE: 70 MMHG | RESPIRATION RATE: 18 BRPM | TEMPERATURE: 97.6 F | HEART RATE: 76 BPM | SYSTOLIC BLOOD PRESSURE: 110 MMHG

## 2024-02-15 VITALS
WEIGHT: 155 LBS | RESPIRATION RATE: 16 BRPM | TEMPERATURE: 98.1 F | HEART RATE: 82 BPM | SYSTOLIC BLOOD PRESSURE: 129 MMHG | DIASTOLIC BLOOD PRESSURE: 74 MMHG | OXYGEN SATURATION: 100 % | BODY MASS INDEX: 24.28 KG/M2

## 2024-02-15 DIAGNOSIS — T82.898A OCCLUDED PICC LINE, INITIAL ENCOUNTER (HCC): Primary | ICD-10-CM

## 2024-02-15 PROCEDURE — 99284 EMERGENCY DEPT VISIT MOD MDM: CPT | Performed by: EMERGENCY MEDICINE

## 2024-02-15 PROCEDURE — NC001 PR NO CHARGE: Performed by: EMERGENCY MEDICINE

## 2024-02-15 PROCEDURE — 71045 X-RAY EXAM CHEST 1 VIEW: CPT

## 2024-02-15 PROCEDURE — 99283 EMERGENCY DEPT VISIT LOW MDM: CPT

## 2024-02-15 RX ADMIN — ALTEPLASE 2 MG: 2.2 INJECTION, POWDER, LYOPHILIZED, FOR SOLUTION INTRAVENOUS at 16:23

## 2024-02-15 NOTE — DISCHARGE INSTRUCTIONS
You were given medication to help relieve any obstruction in your PICC line.    Please call nursing to have labs drawn.

## 2024-02-15 NOTE — ED PROVIDER NOTES
"Patient signed out to me by Dr Perla. Patient 64-year-old male with a PICC line that is able to flush but not with draw for labs.  Pending trial for Cathflo.    /4:42 PM  Patient received medications, wait 30 minutes to retry PICC after meds    5:08 PM  Per staff patient's PICC line pushes and draws well.  Will plan for DC home      Disclosure: Voice to text software was used in the preparation of this document and could have resulted in translational errors.      Occasional wrong word or \"sound a like\" substitutions may have occurred due to the inherent limitations of voice recognition software.  Read the chart carefully and recognize, using context, where substitutions have occurred.       I have independently reviewed external records are available to me to the level of detail possible within the time constraints of my patient care responsibilities in the ED.         Jeanna Ramires, DO  02/15/24 1733    "

## 2024-02-15 NOTE — ED NOTES
Pt made aware cathflow will have to remain in catheter for 30 minutes before attempting aspiration.      Silvia Humphrey RN  02/15/24 5924

## 2024-02-15 NOTE — ED PROVIDER NOTES
History  Chief Complaint   Patient presents with    Medical Problem     Pt states antibiotic can infuse through his PICC line but visiting nurses have been unable to draw blood from PICC line     A 63 yo male with pmhx of paraplegic, DM, HLD, HTN, neurogenic bladder; presents with concern that his PICC line is not functioning correctly.  Pt reports no issues when infusing his IV antibiotics, last dose this morning.  Visiting nursing however was unable to obtain labs earlier this week, which brought him to the ED.  Pt otherwise denies all symptoms; denying recent fever, chills, chest pain, SOB, abd pain, N/V/D, peripheral edema and rashes.       Pt was recently admitted for chronic osteomyelitis of right ribs 6-8 secondary to nonhealing chest wall decubitus ulcer, s/p I&D and partial resection of eighth rib with latissimus muscle flap and wound closure on 1/25.  Pt was discharged home on IV Vanco to be completed through 3/11.        History provided by:  Patient and medical records    Prior to Admission Medications   Prescriptions Last Dose Informant Patient Reported? Taking?   Accu-Chek FastClix Lancets MISC   No No   Sig: Inject under the skin daily   Blood Glucose Monitoring Suppl (ONE TOUCH ULTRA 2) w/Device KIT   No No   Sig: Use daily   Disposable Gloves (LATEX GLOVES LARGE) MISC  Self No No   Sig: Use as needed up to 3 times a day dispo 2 boxes   Incontinence Supply Disposable (SUNBEAM INCONTINENT UNDER PAD) MISC  Self No No   Sig: Use 1 per week, dispense 4 reusable underpads   Incontinence Supply Disposable MISC  Self No No   Sig: by Does not apply route 2 (two) times a day   Sodium Chloride Flush (NORMAL SALINE FLUSH IV)   Yes No   Sig: Inject 10 mL into a catheter in a vein every 12 (twelve) hours. EVERY 12HR BEFORE AND AFTER IV VANCO INFUSION.  Indications: FLUSH.   acetaminophen (TYLENOL) 325 mg tablet   No No   Sig: Take 2 tablets (650 mg total) by mouth every 6 (six) hours as needed for mild pain    aspirin (RA Aspirin EC) 81 mg EC tablet   No No   Sig: Take 1 tablet (81 mg total) by mouth daily   gabapentin (NEURONTIN) 400 mg capsule   No No   Sig: Take 1 capsule (400 mg total) by mouth 3 (three) times a day for 10 days   glucose blood (Accu-Chek Guide) test strip   No No   Sig: Use 1 each daily Use as instructed   ibuprofen (MOTRIN) 800 mg tablet   No No   Sig: Take 1 tablet (800 mg total) by mouth every 8 (eight) hours as needed for mild pain   methocarbamol (ROBAXIN) 500 mg tablet   No No   Sig: Take 1 tablet (500 mg total) by mouth 4 (four) times a day for 10 days   oxyCODONE (ROXICODONE) 10 MG TABS   No No   Sig: Take 1 tablet (10 mg total) by mouth every 4 (four) hours as needed for moderate pain for up to 8 doses Max Daily Amount: 60 mg   vancomycin (VANCOCIN) 1 g   Yes No   Sig: Inject 1 g into a catheter in a vein every 12 (twelve) hours. VANCOMYCIN HCL 1000MG/100ML ECLIPSE BALL OVER 60MIN.   Indications: OSTEO.      Facility-Administered Medications: None     Past Medical History:   Diagnosis Date    Anemia     Blind     r eye    Chronic cystitis     Colostomy in place (HCC)     Detrusor sphincter dyssynergia     Diabetes mellitus ([facility])     Poorly controlled type 2; Last Assessed:  3/18/14    Erectile dysfunction     Frequency of urination     GERD (gastroesophageal reflux disease)     History of diabetes mellitus     History of osteomyelitis     Hx of leg amputation (HCC)     r high upper leg    Hyperlipidemia     Hypertension     Hypospadias     Incomplete bladder emptying     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(HCC) 9/16/2019    Neurogenic bladder     Paralysis (HCC)     Paraplegia (HCC)     Spinal cord cysts     Ulcer of sacral region ([facility])     Urge incontinence        Past Surgical History:   Procedure Laterality Date    AMPUTATION      At hip; Last Assessed:  1/19/16    BLADDER SURGERY      BONE RESECTION, RIB Right 1/25/2024    Procedure: RESECTION RIB R CHEST WALL RESECTION/RIB  "RESECTION/RECONSTRUCTION;  Surgeon: Srikanth Maradiaga MD;  Location: [facility];  Service: Thoracic    COLON SURGERY      llq ostomy pouch    COLOSTOMY      COMPLEX CYSTOMETROGRAM  2014    CT CYSTOGRAM  9/19/2019    CYSTOSCOPY  2014    FL CYSTOGRAM  1/5/2015    FL CYSTOGRAM  11/4/2014    FL CYSTOGRAM  10/24/2014    IR PICC REPOSITION  9/23/2019    IR SUPRAPUBIC CATHETER PLACEMENT  9/19/2019    LEG AMPUTATION      MEATOTOMY      PENILE PROSTHESIS  REMOVAL N/A 11/1/2019    Procedure: REMOVAL PROSTHESIS PENILE;  Surgeon: Cuong Phillips MD;  Location: [facility];  Service: Urology    PENILE PROSTHESIS IMPLANT  2011    MA ADJT/REARRGMT SCALP/ARM/LEG 10.1-30.0 SQ CM Left 5/1/2017    Procedure: POSTERIOR THIGH V-Y ADMANCEMENT;  Surgeon: Gal Cardozo MD;  Location: [facility];  Service: Plastics    MA MUSC MYOCUTANEOUS/FASCIOCUTANEOUS FLAP TRUNK Left 5/1/2017    Procedure: FLAP CLOSURE LEFT ISCHIAL WOUND and \"RIGHT\" ISCHIAL FLAP ADVANCEMENT * DEBRIDEMENT, VAC PLACEMENT ;  Surgeon: Gal Cardozo MD;  Location: [facility];  Service: Plastics    MA MUSC MYOCUTANEOUS/FASCIOCUTANEOUS FLAP TRUNK Left 9/27/2017    Procedure: gluteal myocutaneous rotational flap, posterior thigh v to y advancement- wound 5 x 2.5 x 8;  Surgeon: Gal Cardozo MD;  Location: [facility];  Service: Plastics    MA MUSC MYOCUTANEOUS/FASCIOCUTANEOUS FLAP TRUNK N/A 1/25/2024    Procedure: latissimus flap;  Surgeon: Alessandra Bell MD;  Location: [facility];  Service: Plastics    SPINE SURGERY      Lower back    UROFLOWMETRY SIMPLE / COMPLEX  2014       Family History   Problem Relation Age of Onset    No Known Problems Mother     No Known Problems Father      I have reviewed and agree with the history as documented.    E-Cigarette/Vaping    E-Cigarette Use Never User      E-Cigarette/Vaping Substances    Nicotine No     THC No     CBD No     Flavoring No     Other No     Unknown No      Social History     Tobacco Use    Smoking status: Former     " Current packs/day: 0.00     Average packs/day: 0.5 packs/day for 10.0 years (5.0 ttl pk-yrs)     Types: Cigarettes     Start date:      Quit date:      Years since quittin.1    Smokeless tobacco: Never    Tobacco comments:     Onset date:  11/10/17   Vaping Use    Vaping status: Never Used   Substance Use Topics    Alcohol use: Yes     Comment: Per [employer]:  Social drinker (Onset date:  11/10/17)    Drug use: No       Review of Systems   All other systems reviewed and are negative.    Physical Exam  Physical Exam  General Appearance: alert and oriented, nad, non toxic appearing  Skin:  Warm, dry, intact.  No cyanosis  HEENT: Atraumatic, normocephalic.  No eye drainage.  Normal hearing.  Moist mucous membranes.    Neck: Supple, trachea midline  Cardiac: RRR; no murmurs, rub, gallops.  No pedal edema, 2+ pulses  Pulmonary: lungs CTAB; no wheezes, rales, rhonchi  Gastrointestinal: abdomen soft, nontender, nondistended; no guarding or rebound tenderness; good bowel sounds, no mass or bruits  Extremities:  Single lumen PICC line to LUE, no surrounding erythema or warmth.  Upper extremity is soft to palpation.  No deformities.  No calf tenderness, no clubbing  Neuro:  CN 2-12 grossly intact.  Paraplegic.  Psych:  Normal mood and affect, normal judgement and insight      Vital Signs  ED Triage Vitals [02/15/24 1343]   Temperature Pulse Respirations Blood Pressure SpO2   98.1 °F (36.7 °C) 82 16 129/74 100 %      Temp Source Heart Rate Source Patient Position - Orthostatic VS BP Location FiO2 (%)   Oral Monitor Sitting Left arm --      Pain Score       No Pain           Vitals:    02/15/24 1343   BP: 129/74   Pulse: 82   Patient Position - Orthostatic VS: Sitting         Visual Acuity      ED Medications  Medications   alteplase (CATHFLO) injection 2 mg (2 mg Intracatheter Given 2/15/24 1623)       Diagnostic Studies  Results Reviewed       None                   XR chest 1 view portable   ED Interpretation  by Farida Perla,  (02/15 1537)   PICC line in place.  No active disease    Image independently interpreted by myself                  Procedures  Procedures         ED Course  ED Course as of 02/16/24 0855   Thu Feb 15, 2024   1554 RN able to flush PICC line, unable to pullback blood.  Will trial cathflo                               SBIRT 20yo+      Flowsheet Row Most Recent Value   Initial Alcohol Screen: US AUDIT-C     1. How often do you have a drink containing alcohol? 0 Filed at: 02/15/2024 1348   2. How many drinks containing alcohol do you have on a typical day you are drinking?  0 Filed at: 02/15/2024 1348   3a. Male UNDER 65: How often do you have five or more drinks on one occasion? 0 Filed at: 02/15/2024 1348   3b. FEMALE Any Age, or MALE 65+: How often do you have 4 or more drinks on one occassion? 0 Filed at: 02/15/2024 1348   Audit-C Score 0 Filed at: 02/15/2024 1348   YOUSIF: How many times in the past year have you...    Used an illegal drug or used a prescription medication for non-medical reasons? Never Filed at: 02/15/2024 1348                      Medical Decision Making  A 64 male presents with concern for malfunctioning PICC line.  Will obtain CXR to confirm placement, if appropriate positioning RN will attempt to flush and withdraw off line.    Amount and/or Complexity of Data Reviewed  Radiology: ordered and independent interpretation performed.    Risk  Prescription drug management.             Disposition  Final diagnoses:   Occluded PICC line, initial encounter (HCC)     Time reflects when diagnosis was documented in both MDM as applicable and the Disposition within this note       Time User Action Codes Description Comment    2/15/2024  5:09 PM Jeanna Ramires Add [T82.898A] Occluded PICC line, initial encounter (HCC)           ED Disposition       ED Disposition   Discharge    Condition   Stable    Date/Time   Thu Feb 15, 2024 1708    Comment   Rikki Arguello discharge to  home/self care.                   Follow-up Information       Follow up With Specialties Details Why Contact Info    Lexy Bo MD Family Medicine Schedule an appointment as soon as possible for a visit in 1 week  [address] [place] [address]  [phone]              Discharge Medication List as of 2/15/2024  5:09 PM        CONTINUE these medications which have NOT CHANGED    Details   Accu-Chek FastClix Lancets MISC Inject under the skin daily, Starting Thu 8/10/2023, Normal      acetaminophen (TYLENOL) 325 mg tablet Take 2 tablets (650 mg total) by mouth every 6 (six) hours as needed for mild pain, Starting Fri 2/2/2024, Normal      aspirin (RA Aspirin EC) 81 mg EC tablet Take 1 tablet (81 mg total) by mouth daily, Starting Wed 1/17/2024, Normal      Blood Glucose Monitoring Suppl (ONE TOUCH ULTRA 2) w/Device KIT Use daily, Starting Thu 9/29/2022, Normal      Disposable Gloves (LATEX GLOVES LARGE) MISC Use as needed up to 3 times a day dispo 2 boxes, Print      gabapentin (NEURONTIN) 400 mg capsule Take 1 capsule (400 mg total) by mouth 3 (three) times a day for 10 days, Starting Fri 2/2/2024, Until Mon 2/12/2024, Normal      glucose blood (Accu-Chek Guide) test strip Use 1 each daily Use as instructed, Starting Thu 8/10/2023, Normal      ibuprofen (MOTRIN) 800 mg tablet Take 1 tablet (800 mg total) by mouth every 8 (eight) hours as needed for mild pain, Starting Wed 1/17/2024, Normal      !! Incontinence Supply Disposable (SUNBEAM INCONTINENT UNDER PAD) MISC Use 1 per week, dispense 4 reusable underpads, Print      !! Incontinence Supply Disposable MISC by Does not apply route 2 (two) times a day, Starting Wed 2/26/2020, Print      methocarbamol (ROBAXIN) 500 mg tablet Take 1 tablet (500 mg total) by mouth 4 (four) times a day for 10 days, Starting Fri 2/2/2024, Until Mon 2/12/2024, Normal      oxyCODONE (ROXICODONE) 10 MG TABS Take 1 tablet (10 mg total) by mouth every 4 (four) hours as  needed for moderate pain for up to 8 doses Max Daily Amount: 60 mg, Starting Fri 2/2/2024, Normal      Sodium Chloride Flush (NORMAL SALINE FLUSH IV) Inject 10 mL into a catheter in a vein every 12 (twelve) hours. EVERY 12HR BEFORE AND AFTER IV VANCO INFUSION.  Indications: FLUSH., Historical Med      vancomycin (VANCOCIN) 1 g Inject 1 g into a catheter in a vein every 12 (twelve) hours. VANCOMYCIN HCL 1000MG/100ML ECLIPSE BALL OVER 60MIN.   Indications: OSTEO., Historical Med       !! - Potential duplicate medications found. Please discuss with provider.          No discharge procedures on file.    PDMP Review         Value Time User    PDMP Reviewed  Yes 12/20/2023  8:17 AM KALPANA Philip            ED Provider  Electronically Signed by             Farida Perla DO  02/16/24 0875

## 2024-02-16 ENCOUNTER — HOME CARE VISIT (OUTPATIENT)
Dept: HOME HEALTH SERVICES | Facility: HOME HEALTHCARE | Age: 64
End: 2024-02-16
Payer: MEDICARE

## 2024-02-16 VITALS
RESPIRATION RATE: 18 BRPM | SYSTOLIC BLOOD PRESSURE: 118 MMHG | HEART RATE: 84 BPM | DIASTOLIC BLOOD PRESSURE: 70 MMHG | TEMPERATURE: 97.8 F | OXYGEN SATURATION: 97 %

## 2024-02-16 DIAGNOSIS — M54.50 CHRONIC LOW BACK PAIN WITHOUT SCIATICA, UNSPECIFIED BACK PAIN LATERALITY: ICD-10-CM

## 2024-02-16 DIAGNOSIS — M86.68 OTHER CHRONIC OSTEOMYELITIS, OTHER SITE (HCC): ICD-10-CM

## 2024-02-16 DIAGNOSIS — G89.29 CHRONIC LOW BACK PAIN WITHOUT SCIATICA, UNSPECIFIED BACK PAIN LATERALITY: ICD-10-CM

## 2024-02-16 PROCEDURE — G0299 HHS/HOSPICE OF RN EA 15 MIN: HCPCS

## 2024-02-16 NOTE — TELEPHONE ENCOUNTER
Called and spoke with patient today.   Informed patient I was calling to schedule 2 week follow up appointment. Patient is scheduled 3/1/24 at 3PM with KALPANA Olea at the [place] office.   Patient also states he did go to ED on 2/15/24 and was able to get PICC unclogged. Patient states visiting nurse is scheduled 2/19/24 for labs to be done.

## 2024-02-18 VITALS
OXYGEN SATURATION: 97 % | DIASTOLIC BLOOD PRESSURE: 68 MMHG | TEMPERATURE: 97.1 F | SYSTOLIC BLOOD PRESSURE: 120 MMHG | HEART RATE: 76 BPM | RESPIRATION RATE: 20 BRPM

## 2024-02-19 ENCOUNTER — TREATMENT (OUTPATIENT)
Dept: OTHER | Facility: HOSPITAL | Age: 64
End: 2024-02-19

## 2024-02-19 ENCOUNTER — TELEPHONE (OUTPATIENT)
Age: 64
End: 2024-02-19

## 2024-02-19 ENCOUNTER — HOME CARE VISIT (OUTPATIENT)
Dept: HOME HEALTH SERVICES | Facility: HOME HEALTHCARE | Age: 64
End: 2024-02-19
Payer: MEDICARE

## 2024-02-19 DIAGNOSIS — M86.9 OSTEOMYELITIS OF PELVIC REGION (HCC): Primary | ICD-10-CM

## 2024-02-19 LAB
FUNGUS SPEC CULT: NORMAL
FUNGUS SPEC CULT: NORMAL

## 2024-02-19 PROCEDURE — 10330064 PAD, ABD 5X9" STR LF (1/PK 20PK/BX) MGM1

## 2024-02-19 PROCEDURE — 10330064 CATHETER, FOLEY 2WAY 5CC 16FR (10/BX)

## 2024-02-19 PROCEDURE — 10330064 BANDAGE, GAUZE COTTON  6PLY STR 4"X4YDS

## 2024-02-19 PROCEDURE — 10330064 SYRINGE, LL 10CC (100/BX 12BX/CS)

## 2024-02-19 PROCEDURE — 10330064 CLEANSER, WND SEA-CLEANS 6OZ  COLPLT

## 2024-02-19 PROCEDURE — 10330064 TAPE, RETENTION 2"X10YDS (1/BX24BX/CS)

## 2024-02-19 PROCEDURE — G0299 HHS/HOSPICE OF RN EA 15 MIN: HCPCS

## 2024-02-19 PROCEDURE — 10330064

## 2024-02-19 PROCEDURE — 10330064 WIPE, SKIN GEL PROT DRSNG (50/BX)

## 2024-02-19 PROCEDURE — 10330064 SYRINGE, CATH TIP FLAT STR 60CC (50/CS)

## 2024-02-19 PROCEDURE — 10330064 CATH TRAY, FOLEY SYR 10CC (20/CS)

## 2024-02-20 ENCOUNTER — OFFICE VISIT (OUTPATIENT)
Dept: PLASTIC SURGERY | Facility: CLINIC | Age: 64
End: 2024-02-20

## 2024-02-20 ENCOUNTER — HOSPITAL ENCOUNTER (OUTPATIENT)
Dept: INFUSION CENTER | Facility: HOSPITAL | Age: 64
Discharge: HOME | End: 2024-02-20
Payer: MEDICARE

## 2024-02-20 DIAGNOSIS — M86.9 OSTEOMYELITIS OF PELVIC REGION (HCC): Primary | ICD-10-CM

## 2024-02-20 DIAGNOSIS — L89.134 STAGE IV PRESSURE ULCER OF RIGHT LOWER BACK (HCC): Primary | ICD-10-CM

## 2024-02-20 PROCEDURE — 99024 POSTOP FOLLOW-UP VISIT: CPT | Performed by: PHYSICIAN ASSISTANT

## 2024-02-20 PROCEDURE — 96523 IRRIG DRUG DELIVERY DEVICE: CPT

## 2024-02-20 NOTE — PLAN OF CARE
Problem: Potential for Falls  Goal: Patient will remain free of falls  Description: INTERVENTIONS:  - Educate patient/family on patient safety including physical limitations  - Instruct patient to call for assistance with activity   - Consult OT/PT to assist with strengthening/mobility   - Keep Call bell within reach  - Keep bed low and locked with side rails adjusted as appropriate  - Keep care items and personal belongings within reach  - Initiate and maintain comfort rounds  - Make Fall Risk Sign visible to staff    - Apply yellow socks and bracelet for high fall risk patients  - Consider moving patient to room near nurses station  Outcome: Progressing     
0 Hour(s) 13 Minute(s)

## 2024-02-20 NOTE — PROGRESS NOTES
Rikki TEE Moscat  into infusion for PICC troubleshooting. PICC flushed and blood return noted. Cap changed and dressing reinforced. AVS declined.

## 2024-02-21 ENCOUNTER — OFFICE VISIT (OUTPATIENT)
Dept: FAMILY MEDICINE CLINIC | Facility: CLINIC | Age: 64
End: 2024-02-21

## 2024-02-21 ENCOUNTER — TELEPHONE (OUTPATIENT)
Age: 64
End: 2024-02-21

## 2024-02-21 ENCOUNTER — HOME CARE VISIT (OUTPATIENT)
Dept: HOME HEALTH SERVICES | Facility: HOME HEALTHCARE | Age: 64
End: 2024-02-21
Payer: MEDICARE

## 2024-02-21 VITALS
OXYGEN SATURATION: 97 % | SYSTOLIC BLOOD PRESSURE: 110 MMHG | HEART RATE: 78 BPM | TEMPERATURE: 97.2 F | RESPIRATION RATE: 18 BRPM | DIASTOLIC BLOOD PRESSURE: 70 MMHG

## 2024-02-21 VITALS
SYSTOLIC BLOOD PRESSURE: 112 MMHG | TEMPERATURE: 98.3 F | OXYGEN SATURATION: 98 % | RESPIRATION RATE: 18 BRPM | DIASTOLIC BLOOD PRESSURE: 72 MMHG | HEART RATE: 51 BPM

## 2024-02-21 DIAGNOSIS — K55.069 MESENTERIC THROMBOSIS (HCC): ICD-10-CM

## 2024-02-21 DIAGNOSIS — F11.20 CONTINUOUS OPIOID DEPENDENCE (HCC): ICD-10-CM

## 2024-02-21 DIAGNOSIS — M86.68 OTHER CHRONIC OSTEOMYELITIS, OTHER SITE (HCC): ICD-10-CM

## 2024-02-21 DIAGNOSIS — M54.50 CHRONIC LOW BACK PAIN WITHOUT SCIATICA, UNSPECIFIED BACK PAIN LATERALITY: ICD-10-CM

## 2024-02-21 DIAGNOSIS — G89.29 CHRONIC LOW BACK PAIN WITHOUT SCIATICA, UNSPECIFIED BACK PAIN LATERALITY: ICD-10-CM

## 2024-02-21 DIAGNOSIS — Z23 ENCOUNTER FOR IMMUNIZATION: Primary | ICD-10-CM

## 2024-02-21 PROBLEM — Z12.5 PROSTATE CANCER SCREENING: Status: RESOLVED | Noted: 2019-11-18 | Resolved: 2024-02-21

## 2024-02-21 PROBLEM — Z12.11 COLON CANCER SCREENING: Status: RESOLVED | Noted: 2019-04-23 | Resolved: 2024-02-21

## 2024-02-21 PROBLEM — A41.9 SEPSIS (HCC): Status: RESOLVED | Noted: 2019-10-28 | Resolved: 2024-02-21

## 2024-02-21 PROBLEM — I95.9 SEPSIS WITH HYPOTENSION: Status: RESOLVED | Noted: 2019-08-23 | Resolved: 2024-02-21

## 2024-02-21 PROBLEM — A41.9 SEPSIS WITH HYPOTENSION: Status: RESOLVED | Noted: 2019-08-23 | Resolved: 2024-02-21

## 2024-02-21 PROCEDURE — 90677 PCV20 VACCINE IM: CPT | Performed by: FAMILY MEDICINE

## 2024-02-21 PROCEDURE — G0008 ADMIN INFLUENZA VIRUS VAC: HCPCS | Performed by: FAMILY MEDICINE

## 2024-02-21 PROCEDURE — 3078F DIAST BP <80 MM HG: CPT | Performed by: FAMILY MEDICINE

## 2024-02-21 PROCEDURE — 90686 IIV4 VACC NO PRSV 0.5 ML IM: CPT | Performed by: FAMILY MEDICINE

## 2024-02-21 PROCEDURE — G0299 HHS/HOSPICE OF RN EA 15 MIN: HCPCS

## 2024-02-21 PROCEDURE — 99214 OFFICE O/P EST MOD 30 MIN: CPT | Performed by: FAMILY MEDICINE

## 2024-02-21 PROCEDURE — 3074F SYST BP LT 130 MM HG: CPT | Performed by: FAMILY MEDICINE

## 2024-02-21 PROCEDURE — G0009 ADMIN PNEUMOCOCCAL VACCINE: HCPCS | Performed by: FAMILY MEDICINE

## 2024-02-21 RX ORDER — OXYCODONE HYDROCHLORIDE 15 MG/1
15 TABLET ORAL EVERY 4 HOURS PRN
Qty: 120 TABLET | Refills: 0 | Status: SHIPPED | OUTPATIENT
Start: 2024-02-21

## 2024-02-21 NOTE — TELEPHONE ENCOUNTER
Nicol from visiting nurse called with an update, said they were able to give him the vancomycin through the PICC. He is planning on calling the mobile lab to get blood drawn either Thus or Fri.

## 2024-02-21 NOTE — PROGRESS NOTES
Assessment/Plan:     Patient is a 64-year-old male who is status post resection of right rib and chest wall reconstruction via latissimus flap by Dr. Bell in conjunction with Dr. Maradiaga on 1/25/2024.  Please see HPI.    Patient returns to the office today for an incision and drain check.  Unfortunately, 1 of patient's drains  fell out the day prior to his appointment and his remaining drain was no longer connected at the time of the visit, this drain was also removed due to concerns of infection risk.     Patient and his wife were extensively counseled in regard to signs/ symptoms of seroma. He will return to the office in approx 1 week for an incision check or sooner with any questions or concerns.      There are no diagnoses linked to this encounter.      Subjective:     Patient ID: Rikki Arguello is a 64 y.o. male.    HPI    Patient presents to the office today with his wife.  He is very pleasant.  Does report drain falling out yesterday.  He has been unable to get a new wheelchair yet.    Review of Systems    See HPI    Objective:     Physical Exam      Incision is clean, dry, intact and fully healed.  Drain is serous.  Drain tubing is superiorly attached and is partially out.

## 2024-02-21 NOTE — PROGRESS NOTES
Assessment/Plan:    No problem-specific Assessment & Plan notes found for this encounter.       Diagnoses and all orders for this visit:    Encounter for immunization  -     influenza vaccine, quadrivalent, 0.5 mL, preservative-free, for adult and pediatric patients 6 mos+ (AFLURIA, FLUARIX, FLULAVAL, FLUZONE)  -     Pneumococcal Conjugate Vaccine 20-valent (Pcv20)    Other chronic osteomyelitis, other site (HCC)  -     oxyCODONE (ROXICODONE) 15 mg immediate release tablet; Take 1 tablet (15 mg total) by mouth every 4 (four) hours as needed for moderate pain Max Daily Amount: 90 mg    Chronic low back pain without sciatica, unspecified back pain laterality  -     oxyCODONE (ROXICODONE) 15 mg immediate release tablet; Take 1 tablet (15 mg total) by mouth every 4 (four) hours as needed for moderate pain Max Daily Amount: 90 mg    Continuous opioid dependence (HCC)  -     oxyCODONE (ROXICODONE) 15 mg immediate release tablet; Take 1 tablet (15 mg total) by mouth every 4 (four) hours as needed for moderate pain Max Daily Amount: 90 mg    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HCC)          Subjective:      Patient ID: Rikki Arguello is a 64 y.o. male.    Rikki Arguello is a 64 y.o. male who presents with a right chest wall wound. The wound has been managed by Dr. Yan in the outpatient setting. Pt was recently admitted for chronic osteomyelitis of right ribs 6-8 secondary to nonhealing chest wall decubitus ulcer, s/p I&D and partial resection of eighth rib with latissimus muscle flap and wound closure on 1/25.  Pt was discharged home on IV Vanco to be completed through 3/11. Patient here today for follow up  Patient has been having intermittent problems with PICC line. Seen in the ED on 2/15/2024 for  PICC that is able to flush but not with draw for labs. Patient received Cathflow in the ED and PICC was than able to flush and draw, sent home.  Seen by Plastic Surgery yesterday. Has VNA, follows with ID         The following  portions of the patient's history were reviewed and updated as appropriate: He  has a past medical history of Anemia, Blind, Chronic cystitis, Colostomy in place ([facility]), Detrusor sphincter dyssynergia, Diabetes mellitus ([facility]), Erectile dysfunction, Frequency of urination, GERD (gastroesophageal reflux disease), History of diabetes mellitus, History of osteomyelitis, leg amputation ([facility]), Hyperlipidemia, Hypertension, Hypospadias, Incomplete bladder emptying, 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([facility]) (9/16/2019), Neurogenic bladder, Paralysis ([facility]), Paraplegia ([facility]), Spinal cord cysts, Ulcer of sacral region ([facility]), and Urge incontinence.  He   Patient Active Problem List    Diagnosis Date Noted   • Subacute osteomyelitis, other site ([facility]) 12/22/2023   • Foul smelling urine 08/17/2023   • Pressure ulcer of left buttock, stage 4 ([facility]) 01/10/2023   • Heme positive stool 09/02/2022   • Open wound of buttock, left, initial encounter 05/27/2022   • Pressure injury of contiguous region involving back and left buttock, stage 4 ([facility]) 11/19/2021   • 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 ([facility]) 11/05/2021   • Stage IV pressure ulcer of right lower back ([facility]) 11/05/2021   • Type 2 diabetes mellitus without complication, without long-term current use of insulin ([facility]) 09/29/2021   • Renal cyst 11/18/2019   • Other male erectile dysfunction 11/18/2019   • Prostate cancer screening 11/18/2019   • Hypokalemia 10/29/2019   • SBO (small bowel obstruction) ([facility]) 10/28/2019   • Sepsis ([facility]) 10/28/2019   • Stage II pressure ulcer of buttock ([facility]) 09/17/2019   • Left perineal ischial pressure ulcer, stage IV ([facility]) 09/16/2019   • Hyperkalemia 09/14/2019   • History of Clostridium difficile colitis 08/25/2019   • Neurogenic bladder 08/23/2019   • Sepsis with hypotension  08/23/2019   • Osteomyelitis of pelvic region ([facility]) 05/06/2019   • 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[facility]) 05/06/2019   • Colostomy in place ([facility]) 04/23/2019   • Colon cancer screening 04/23/2019   •  Dyspepsia 04/23/2019   • Epigastric pain 04/23/2019   • Continuous opioid dependence ([facility])    • Decubitus ulcer of ischium, left, stage IV ([facility]) 11/12/2018   • Diarrhea 09/21/2018   • Amputated right leg ([facility]) 07/18/2018   • Anxiety 04/23/2018   • GERD (gastroesophageal reflux disease)    • History of osteomyelitis    • Cyst of joint of shoulder 10/20/2016   • Anemia 10/20/2016   • Paraplegic spinal paralysis ([facility]) 10/20/2016   • Sinus tachycardia 10/20/2016   • Stage IV pressure ulcer of sacral region ([facility]) 10/15/2016   • Stage IV pressure ulcer of left heel ([facility]) 10/15/2016   • Diabetes mellitus type II, controlled ([facility]) 03/07/2014   • Benign essential hypertension 07/31/2013     He  has a past surgical history that includes Spine surgery; Leg amputation; Bladder surgery; Colostomy; Amputation; pr adjt/rearrgmt scalp/arm/leg 10.1-30.0 sq cm (Left, 5/1/2017); pr musc myocutaneous/fasciocutaneous flap trunk (Left, 5/1/2017); Colon surgery; pr musc myocutaneous/fasciocutaneous flap trunk (Left, 9/27/2017); Complex cystometrogram (2014); Uroflowmetry simple / complex (2014); Cystoscopy (2014); Penile prosthesis implant (2011); Meatotomy; CT cystogram (9/19/2019); IR suprapubic catheter placement (9/19/2019); IR PICC reposition (9/23/2019); Penile prosthesis removal (N/A, 11/1/2019); [place] cystogram (1/5/2015); [place] cystogram (11/4/2014); [place] cystogram (10/24/2014); pr musc myocutaneous/fasciocutaneous flap trunk (N/A, 1/25/2024); and Bone Resection, Rib (Right, 1/25/2024).  His family history includes No Known Problems in his father and mother.  He  reports that he quit smoking about 37 years ago. His smoking use included cigarettes. He started smoking about 47 years ago. He has a 5 pack-year smoking history. He has never used smokeless tobacco. He reports current alcohol use. He reports that he does not use drugs.  Current Outpatient Medications   Medication Sig Dispense Refill   • oxyCODONE (ROXICODONE) 15 mg immediate  release tablet Take 1 tablet (15 mg total) by mouth every 4 (four) hours as needed for moderate pain Max Daily Amount: 90 mg 120 tablet 0   • Accu-Chek FastClix Lancets MISC Inject under the skin daily 100 each 0   • acetaminophen (TYLENOL) 325 mg tablet Take 2 tablets (650 mg total) by mouth every 6 (six) hours as needed for mild pain 40 tablet 0   • aspirin (RA Aspirin EC) 81 mg EC tablet Take 1 tablet (81 mg total) by mouth daily 90 tablet 0   • Blood Glucose Monitoring Suppl (ONE TOUCH ULTRA 2) w/Device KIT Use daily 100 kit 0   • Disposable Gloves (LATEX GLOVES LARGE) MISC Use as needed up to 3 times a day dispo 2 boxes 2 each 11   • gabapentin (NEURONTIN) 400 mg capsule Take 1 capsule (400 mg total) by mouth 3 (three) times a day for 10 days 30 capsule 0   • glucose blood (Accu-Chek Guide) test strip Use 1 each daily Use as instructed 100 each 0   • ibuprofen (MOTRIN) 800 mg tablet Take 1 tablet (800 mg total) by mouth every 8 (eight) hours as needed for mild pain 60 tablet 0   • Incontinence Supply Disposable (SUNBEAM INCONTINENT UNDER PAD) MISC Use 1 per week, dispense 4 reusable underpads 4 each 11   • Incontinence Supply Disposable MISC by Does not apply route 2 (two) times a day 60 each 11   • methocarbamol (ROBAXIN) 500 mg tablet Take 1 tablet (500 mg total) by mouth 4 (four) times a day for 10 days 40 tablet 0   • oxyCODONE (ROXICODONE) 10 MG TABS Take 1 tablet (10 mg total) by mouth every 4 (four) hours as needed for moderate pain for up to 8 doses Max Daily Amount: 60 mg 8 tablet 0   • Sodium Chloride Flush (NORMAL SALINE FLUSH IV) Inject 10 mL into a catheter in a vein every 12 (twelve) hours. EVERY 12HR BEFORE AND AFTER IV VANCO INFUSION.  Indications: FLUSH.     • vancomycin (VANCOCIN) 1 g Inject 1 g into a catheter in a vein every 12 (twelve) hours. VANCOMYCIN HCL 1000MG/100ML ECLIPSE BALL OVER 60MIN.   Indications: OSTEO.       No current facility-administered medications for this visit.      Facility-Administered Medications Ordered in Other Visits   Medication Dose Route Frequency Provider Last Rate Last Admin   • alteplase (CATHFLO) injection 2 mg  2 mg Intracatheter Q1MIN PRN Yaneth Winston MD         Current Outpatient Medications on File Prior to Visit   Medication Sig   • Accu-Chek FastClix Lancets MISC Inject under the skin daily   • acetaminophen (TYLENOL) 325 mg tablet Take 2 tablets (650 mg total) by mouth every 6 (six) hours as needed for mild pain   • aspirin (RA Aspirin EC) 81 mg EC tablet Take 1 tablet (81 mg total) by mouth daily   • Blood Glucose Monitoring Suppl (ONE TOUCH ULTRA 2) w/Device KIT Use daily   • Disposable Gloves (LATEX GLOVES LARGE) MISC Use as needed up to 3 times a day dispo 2 boxes   • gabapentin (NEURONTIN) 400 mg capsule Take 1 capsule (400 mg total) by mouth 3 (three) times a day for 10 days   • glucose blood (Accu-Chek Guide) test strip Use 1 each daily Use as instructed   • ibuprofen (MOTRIN) 800 mg tablet Take 1 tablet (800 mg total) by mouth every 8 (eight) hours as needed for mild pain   • Incontinence Supply Disposable (SUNBEAM INCONTINENT UNDER PAD) MISC Use 1 per week, dispense 4 reusable underpads   • Incontinence Supply Disposable MISC by Does not apply route 2 (two) times a day   • methocarbamol (ROBAXIN) 500 mg tablet Take 1 tablet (500 mg total) by mouth 4 (four) times a day for 10 days   • oxyCODONE (ROXICODONE) 10 MG TABS Take 1 tablet (10 mg total) by mouth every 4 (four) hours as needed for moderate pain for up to 8 doses Max Daily Amount: 60 mg   • Sodium Chloride Flush (NORMAL SALINE FLUSH IV) Inject 10 mL into a catheter in a vein every 12 (twelve) hours. EVERY 12HR BEFORE AND AFTER IV VANCO INFUSION.  Indications: FLUSH.   • vancomycin (VANCOCIN) 1 g Inject 1 g into a catheter in a vein every 12 (twelve) hours. VANCOMYCIN HCL 1000MG/100ML ECLIPSE BALL OVER 60MIN.   Indications: OSTEO.     Current Facility-Administered Medications on File  Prior to Visit   Medication   • alteplase (CATHFLO) injection 2 mg     He has No Known Allergies..    Review of Systems   Musculoskeletal:  Positive for arthralgias, back pain and gait problem.   All other systems reviewed and are negative.        Objective:      /72 (BP Location: Right arm, Patient Position: Sitting, Cuff Size: Standard)   Pulse (!) 51   Temp 98.3 °F (36.8 °C) (Temporal)   Resp 18   SpO2 98%          Physical Exam  Vitals and nursing note reviewed.   Constitutional:       Appearance: He is well-developed.   HENT:      Head: Normocephalic.      Right Ear: External ear normal.      Left Ear: External ear normal.      Nose: Nose normal.   Eyes:      Conjunctiva/sclera: Conjunctivae normal.      Pupils: Pupils are equal, round, and reactive to light.   Neck:      Thyroid: No thyromegaly.   Cardiovascular:      Rate and Rhythm: Normal rate and regular rhythm.      Heart sounds: Normal heart sounds.      Comments: PICC line in place  Pulmonary:      Effort: Pulmonary effort is normal.      Breath sounds: Normal breath sounds.   Abdominal:      Palpations: Abdomen is soft.      Tenderness: There is no abdominal tenderness. There is no guarding or rebound.   Musculoskeletal:         General: Tenderness present.      Cervical back: Normal range of motion and neck supple.   Skin:     General: Skin is dry.   Neurological:      Mental Status: He is alert and oriented to person, place, and time.      Deep Tendon Reflexes: Reflexes are normal and symmetric.

## 2024-02-21 NOTE — TELEPHONE ENCOUNTER
Nicol- Visiting [facility] nurse calling with reports of unable to use PICC line. Activase used last week and yesterday- each for 30 minutes. Again, PICC not returning blood despite 45 minutes of trying and repositioning etc.     Requesting venipuncture order for lab because nurse unable to stick. Described very difficult.     Also FYI, patient not receiving Vancomycin dose this morning due to troubles with collecting trough blood work. Plans to go to lab this morning after order placed.       All questions answered. No further needs at the moment.

## 2024-02-22 NOTE — TELEPHONE ENCOUNTER
Spoke with Dr. Salomon while in office. Informed Dr. Salomon patient still has not had labs done as [facility]NA not able to get blood return on PICC and not able to stick patient as he is a hard stick. Per Dr. Salomon patient will need to go to infusion center for cathflo and labs.     Called  infusion center and spoke with Enriqueta today.   Informed Enriqueta per Dr. Salomon patient needs appointment for cathflo and labs. Enriqueta states she has appointment 2/26/24 at 9AM. Informed Enriqueta I will reach out to patient.     Called spoke with patient today.   Informed patient per Dr. Salomon he will need to go to infusion center for cathflo and labs. Informed patient  infusion center has appointment 2/26/24 at 9AM. Patient states that appointment works for him.     Called  infusion back and spoke with Enriqueta.   Informed Enriqueta to schedule patient 2/26/24 at 9AM. Informed Enriqueta Salomon will place beacon orders.     Spoke with ALFREDO Camarena from [facility] via TT today.   Informed Nicol per Dr. Salomon patient will need to go to infusion center for cathflo and labs. Informed Nicol patient has an appointment 2/26/24 at 9AM at  infusion Center.

## 2024-02-22 NOTE — TELEPHONE ENCOUNTER
Confirmed with nurse Nicol.  She can go out for patient's weekly labs tomorrow.  Dr. Salomon is OK with that.  
Contacted Nicol.  She was unable to peripherally draw the blood. Nurse tried many things to get blood work and PICC to work.  Patient will need to go to [facility] ER for cathflo.  Patient incision healing and is otherwise doing well. He unfortunately pulled out another BALTAZAR drain. He goes back and forth to bed by rolling, and they pull out. She feels that this is potentially the reason for this issue with PICC line. She spent two hour with patient trying to fix the issue.    Contacted [facility], spoke with Areli. They can do noon tomorrow but that is all they can do.   
Contacted patient.  He was made aware of appointment tomorrow.  He thanks me for my help.  
Nicol Navas visiting nurses    Re: Pt of Dr. Salomon    Pt went to ER Thursday to have activase put in PICC due to occlusion    Today, pt is having the same issue with occluded PICC. Per ALFREDO Camarena, site looks good (no signs of infiltration, infection). Antibiotics have been going through ok, but this morning it's going slow. No blood return despite multiple flushes. RN unable to draw labs today.     Please advise:  Nicol cell: [phone]       
1

## 2024-02-23 ENCOUNTER — HOME CARE VISIT (OUTPATIENT)
Dept: HOME HEALTH SERVICES | Facility: HOME HEALTHCARE | Age: 64
End: 2024-02-23
Payer: MEDICARE

## 2024-02-23 VITALS
TEMPERATURE: 97.8 F | DIASTOLIC BLOOD PRESSURE: 68 MMHG | HEART RATE: 72 BPM | RESPIRATION RATE: 18 BRPM | SYSTOLIC BLOOD PRESSURE: 110 MMHG | OXYGEN SATURATION: 98 %

## 2024-02-23 PROCEDURE — G0299 HHS/HOSPICE OF RN EA 15 MIN: HCPCS

## 2024-02-23 RX ORDER — OXYCODONE HYDROCHLORIDE 10 MG/1
10 TABLET ORAL EVERY 4 HOURS PRN
Qty: 8 TABLET | Refills: 0 | OUTPATIENT
Start: 2024-02-23

## 2024-02-23 RX ORDER — ASPIRIN 81 MG/1
81 TABLET ORAL DAILY
Qty: 90 TABLET | Refills: 0 | OUTPATIENT
Start: 2024-02-23

## 2024-02-23 RX ORDER — IBUPROFEN 800 MG/1
800 TABLET ORAL EVERY 8 HOURS PRN
Qty: 60 TABLET | Refills: 0 | OUTPATIENT
Start: 2024-02-23

## 2024-02-26 ENCOUNTER — HOME CARE VISIT (OUTPATIENT)
Dept: HOME HEALTH SERVICES | Facility: HOME HEALTHCARE | Age: 64
End: 2024-02-26
Payer: MEDICARE

## 2024-02-26 ENCOUNTER — HOSPITAL ENCOUNTER (OUTPATIENT)
Dept: INFUSION CENTER | Facility: HOSPITAL | Age: 64
Discharge: HOME | End: 2024-02-26
Payer: MEDICARE

## 2024-02-26 VITALS
HEART RATE: 72 BPM | DIASTOLIC BLOOD PRESSURE: 70 MMHG | TEMPERATURE: 98.1 F | RESPIRATION RATE: 20 BRPM | SYSTOLIC BLOOD PRESSURE: 110 MMHG | OXYGEN SATURATION: 96 %

## 2024-02-26 DIAGNOSIS — M86.28 SUBACUTE OSTEOMYELITIS, OTHER SITE (HCC): Primary | ICD-10-CM

## 2024-02-26 DIAGNOSIS — M86.9 OSTEOMYELITIS (HCC): ICD-10-CM

## 2024-02-26 LAB
ANION GAP SERPL CALCULATED.3IONS-SCNC: 8 MMOL/L
BASOPHILS # BLD AUTO: 0.07 THOUSANDS/ÂΜL (ref 0–0.1)
BASOPHILS NFR BLD AUTO: 1 % (ref 0–1)
BUN SERPL-MCNC: 21 MG/DL (ref 5–25)
CALCIUM SERPL-MCNC: 9 MG/DL (ref 8.4–10.2)
CHLORIDE SERPL-SCNC: 105 MMOL/L (ref 96–108)
CO2 SERPL-SCNC: 25 MMOL/L (ref 21–32)
CREAT SERPL-MCNC: 0.38 MG/DL (ref 0.6–1.3)
EOSINOPHIL # BLD AUTO: 0.31 THOUSAND/ÂΜL (ref 0–0.61)
EOSINOPHIL NFR BLD AUTO: 5 % (ref 0–6)
ERYTHROCYTE [DISTWIDTH] IN BLOOD BY AUTOMATED COUNT: 16.5 % (ref 11.6–15.1)
FUNGUS SPEC CULT: NORMAL
FUNGUS SPEC CULT: NORMAL
GFR SERPL CREATININE-BSD FRML MDRD: 129 ML/MIN/1.73SQ M
GLUCOSE SERPL-MCNC: 71 MG/DL (ref 65–140)
HCT VFR BLD AUTO: 29.7 % (ref 36.5–49.3)
HGB BLD-MCNC: 8.5 G/DL (ref 12–17)
IMM GRANULOCYTES # BLD AUTO: 0.01 THOUSAND/UL (ref 0–0.2)
IMM GRANULOCYTES NFR BLD AUTO: 0 % (ref 0–2)
LYMPHOCYTES # BLD AUTO: 1.47 THOUSANDS/ÂΜL (ref 0.6–4.47)
LYMPHOCYTES NFR BLD AUTO: 23 % (ref 14–44)
MCH RBC QN AUTO: 22.2 PG (ref 26.8–34.3)
MCHC RBC AUTO-ENTMCNC: 28.6 G/DL (ref 31.4–37.4)
MCV RBC AUTO: 78 FL (ref 82–98)
MONOCYTES # BLD AUTO: 0.47 THOUSAND/ÂΜL (ref 0.17–1.22)
MONOCYTES NFR BLD AUTO: 7 % (ref 4–12)
NEUTROPHILS # BLD AUTO: 4.05 THOUSANDS/ÂΜL (ref 1.85–7.62)
NEUTS SEG NFR BLD AUTO: 64 % (ref 43–75)
NRBC BLD AUTO-RTO: 0 /100 WBCS
PLATELET # BLD AUTO: 369 THOUSANDS/UL (ref 149–390)
PMV BLD AUTO: 11.2 FL (ref 8.9–12.7)
POTASSIUM SERPL-SCNC: 3.9 MMOL/L (ref 3.5–5.3)
RBC # BLD AUTO: 3.83 MILLION/UL (ref 3.88–5.62)
SODIUM SERPL-SCNC: 138 MMOL/L (ref 135–147)
VANCOMYCIN TROUGH SERPL-MCNC: 11.6 UG/ML (ref 10–20)
WBC # BLD AUTO: 6.38 THOUSAND/UL (ref 4.31–10.16)

## 2024-02-26 PROCEDURE — 80048 BASIC METABOLIC PNL TOTAL CA: CPT | Performed by: INTERNAL MEDICINE

## 2024-02-26 PROCEDURE — 85025 COMPLETE CBC W/AUTO DIFF WBC: CPT | Performed by: INTERNAL MEDICINE

## 2024-02-26 PROCEDURE — 80202 ASSAY OF VANCOMYCIN: CPT | Performed by: INTERNAL MEDICINE

## 2024-02-26 PROCEDURE — G0299 HHS/HOSPICE OF RN EA 15 MIN: HCPCS

## 2024-02-26 RX ORDER — WATER 10 ML/10ML
INJECTION INTRAMUSCULAR; INTRAVENOUS; SUBCUTANEOUS
Status: COMPLETED
Start: 2024-02-26 | End: 2024-02-26

## 2024-02-26 RX ADMIN — WATER 10 ML: 1 INJECTION, SOLUTION INTRAMUSCULAR; INTRAVENOUS; SUBCUTANEOUS at 08:49

## 2024-02-26 RX ADMIN — ALTEPLASE 2 MG: 2.2 INJECTION, POWDER, LYOPHILIZED, FOR SOLUTION INTRAVENOUS at 08:49

## 2024-02-26 NOTE — PROGRESS NOTES
Pt presents to infusion center today to receive TPA through PICC. Upon arrival no blood return noted via PICC. TPA given & approximately 90 min later blood return noted. Labs obtained via port & flushed. Pt offers no complaints

## 2024-02-27 ENCOUNTER — TELEPHONE (OUTPATIENT)
Dept: INFECTIOUS DISEASES | Facility: CLINIC | Age: 64
End: 2024-02-27

## 2024-02-27 ENCOUNTER — HOME CARE VISIT (OUTPATIENT)
Dept: HOME HEALTH SERVICES | Facility: HOME HEALTHCARE | Age: 64
End: 2024-02-27
Payer: MEDICARE

## 2024-02-27 DIAGNOSIS — M86.30 CHRONIC MULTIFOCAL OSTEOMYELITIS, UNSPECIFIED SITE (HCC): Primary | ICD-10-CM

## 2024-02-27 NOTE — TELEPHONE ENCOUNTER
Spoke with Dr. Salomon while in office today. Dr. Salomon reviewed patients lab results from 2/26/24. Per Dr. Salomon increase vancomycin dose to [id number] through 3/11/24. Repeat CBCD BMP and Vanco trough prior to dose 3/1/24.     Spoke with Brian from [facility] pharmacy via TT today.   Informed Brian per Dr. Salomon  increase vancomycin dose to [id number] through 3/11/24. Repeat CBCD BMP and Vanco trough prior to dose 3/1/24.  Orders faxed at this time.     Called [facility] and spoke with oYla today.   Informed Yola per Dr. Salomon  increase vancomycin dose to [id number] through 3/11/24. Repeat CBCD BMP and Vanco trough prior to dose 3/1/24.  Lab orders placed and routed at this time.       Called and spoke with patient today.   Informed patient Dr. Salomon received and revied labs results from yesterday. Informed patient per Dr. Salomon  increase vancomycin dose to [id number] through 3/11/24. Repeat CBCD BMP and Vanco trough prior to dose 3/1/24.Informed patient both pharmacy and VNA aware.   Informed patient if there are any further questions or concerns to call the office.   Patient verbalizes understanding at this time.

## 2024-02-28 ENCOUNTER — HOME CARE VISIT (OUTPATIENT)
Dept: HOME HEALTH SERVICES | Facility: HOME HEALTHCARE | Age: 64
End: 2024-02-28
Payer: MEDICARE

## 2024-02-28 ENCOUNTER — OFFICE VISIT (OUTPATIENT)
Dept: PLASTIC SURGERY | Facility: CLINIC | Age: 64
End: 2024-02-28

## 2024-02-28 VITALS
HEART RATE: 72 BPM | DIASTOLIC BLOOD PRESSURE: 70 MMHG | TEMPERATURE: 97.3 F | RESPIRATION RATE: 18 BRPM | OXYGEN SATURATION: 98 % | SYSTOLIC BLOOD PRESSURE: 110 MMHG

## 2024-02-28 DIAGNOSIS — Z09 FOLLOW-UP FOR PLASTIC SURGERY: ICD-10-CM

## 2024-02-28 DIAGNOSIS — L89.134 STAGE IV PRESSURE ULCER OF RIGHT LOWER BACK (HCC): Primary | ICD-10-CM

## 2024-02-28 PROCEDURE — 99024 POSTOP FOLLOW-UP VISIT: CPT

## 2024-02-28 PROCEDURE — G0299 HHS/HOSPICE OF RN EA 15 MIN: HCPCS

## 2024-02-29 PROBLEM — Z45.2 PICC (PERIPHERALLY INSERTED CENTRAL CATHETER) IN PLACE: Status: ACTIVE | Noted: 2024-02-29

## 2024-02-29 PROBLEM — L98.499: Status: ACTIVE | Noted: 2024-02-29

## 2024-02-29 NOTE — PROGRESS NOTES
[facility] Plastic and Reconstructive Surgery  [address], [address], [place] [address]  [phone]    Patient Identification: Rikki Arguello is a 64 y.o. male     History of Present Illness: The patient is a 64 y.o.  year-old male  who presents to the office for post-op visit. Patient is 34 days s/p Resection Rib R Chest Wall Resection/rib Resection/reconstruction - Right and latissimus flap  on 1/25/2024 by Dr. Bell and Dr. Maradiaga.     Patient is accompanied by his caretaker. Patient continues to use wheelchair to ambulate. States the wheelchair company will be replacing wheelchair in April 2024. He uses cushioning to offload pressure to the surgical site. He denies fevers, chills, pain or signs of infection. Home health is coming three times a week. Conducting dressing changes daily of xeroform, bacitracin and ABD pads.  Patient has no complaints at this time.    Past Medical History:   Diagnosis Date    Anemia     Blind     r eye    Chronic cystitis     Colostomy in place ([facility])     Detrusor sphincter dyssynergia     Diabetes mellitus ([facility])     Poorly controlled type 2; Last Assessed:  3/18/14    Erectile dysfunction     Frequency of urination     GERD (gastroesophageal reflux disease)     History of diabetes mellitus     History of osteomyelitis     Hx of leg amputation ([facility])     r high upper leg    Hyperlipidemia     Hypertension     Hypospadias     Incomplete bladder emptying     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([facility]) 9/16/2019    Neurogenic bladder     Paralysis ([facility])     Paraplegia ([facility])     Spinal cord cysts     Ulcer of sacral region ([facility])     Urge incontinence         Review of Systems  Constitutional: Denies fevers, chills or pain.  Skin: Denies any warmth, erythema, edema or mucopurulent drainage.     Physical Exam    Right trunk/Back: Incision site is healing well. No signs of infection or dehiscence. Well perfused flaps. Drain sites healing well. See media.    Assessment and Plan:  The  patient is an 64 y.o.  year-old male who presents to the office for post-op visit. Patient is 34 days s/p Resection Rib R Chest Wall Resection/rib Resection/reconstruction - Right and latissimus flap  on 1/25/2024 by Dr. Bell and Dr. Maradiaga    -At today's visit incisions examined. Healing well with no concerns.  -Reviewed post-op restrictions with patient. Continue to sleep on left side. Avoid pressure to the right trunk. Continue to utilize ABD pads and foam padding to avoid direct pressure.   -Home health/patient care taker to conduct daily: Aquaphor to the incision site, ABD pads for protection.  -Discussed daily scar massage and use of silicone scar gel/tape to promote scar softening and flattening. The patient was educated on scar care and that it can take up to 1 year for full scar maturation.   -Continued encouragement for wheelchair accommodations  -Follow up 3-4 weeks for incision check.  -The patient is to call the office with any questions or concerns. All of the patient's questions were answered at this time and they agree with the plan of care.      Rhona Jiménez PA-C  [facility] Plastic and Reconstructive Surgery

## 2024-02-29 NOTE — PATIENT INSTRUCTIONS
Continue IV vancomycin, current dose is 1,250 mg every 12 hours, through 3/11/2024.    Trough from 3/1/2024 was in goal range, no vancomycin dose adjustment needed today.    Continue weekly lab work (CBCD, creatinine, and vancomycin trough) while on IV antibiotic.    PICC to be removed by home RN after final dose of IV antibiotic on 3/11/2024.    Recommend ongoing follow up with plastic and reconstructive surgery team.    Return to the outpatient infectious disease office as needed for follow up.

## 2024-03-01 ENCOUNTER — TELEPHONE (OUTPATIENT)
Dept: INFECTIOUS DISEASES | Facility: CLINIC | Age: 64
End: 2024-03-01

## 2024-03-01 ENCOUNTER — LAB REQUISITION (OUTPATIENT)
Dept: LAB | Facility: HOSPITAL | Age: 64
End: 2024-03-01
Payer: MEDICARE

## 2024-03-01 ENCOUNTER — OFFICE VISIT (OUTPATIENT)
Dept: INFECTIOUS DISEASES | Facility: CLINIC | Age: 64
End: 2024-03-01
Payer: MEDICARE

## 2024-03-01 ENCOUNTER — HOME CARE VISIT (OUTPATIENT)
Dept: HOME HEALTH SERVICES | Facility: HOME HEALTHCARE | Age: 64
End: 2024-03-01
Payer: MEDICARE

## 2024-03-01 VITALS
HEART RATE: 58 BPM | HEIGHT: 67 IN | BODY MASS INDEX: 24.33 KG/M2 | TEMPERATURE: 97.4 F | SYSTOLIC BLOOD PRESSURE: 115 MMHG | DIASTOLIC BLOOD PRESSURE: 72 MMHG | WEIGHT: 155 LBS | RESPIRATION RATE: 18 BRPM | OXYGEN SATURATION: 98 %

## 2024-03-01 DIAGNOSIS — L98.499: ICD-10-CM

## 2024-03-01 DIAGNOSIS — G82.20 PARAPLEGIC SPINAL PARALYSIS (HCC): ICD-10-CM

## 2024-03-01 DIAGNOSIS — E11.22 CONTROLLED TYPE 2 DIABETES MELLITUS WITH STAGE 1 CHRONIC KIDNEY DISEASE, UNSPECIFIED WHETHER LONG TERM INSULIN USE: ICD-10-CM

## 2024-03-01 DIAGNOSIS — Z45.2 PICC (PERIPHERALLY INSERTED CENTRAL CATHETER) IN PLACE: ICD-10-CM

## 2024-03-01 DIAGNOSIS — Z86.19 HISTORY OF CLOSTRIDIUM DIFFICILE COLITIS: ICD-10-CM

## 2024-03-01 DIAGNOSIS — M86.28 SUBACUTE OSTEOMYELITIS, OTHER SITE (HCC): Primary | ICD-10-CM

## 2024-03-01 DIAGNOSIS — M86.30 CHRONIC MULTIFOCAL OSTEOMYELITIS, UNSPECIFIED SITE (HCC): ICD-10-CM

## 2024-03-01 DIAGNOSIS — N18.1 CONTROLLED TYPE 2 DIABETES MELLITUS WITH STAGE 1 CHRONIC KIDNEY DISEASE, UNSPECIFIED WHETHER LONG TERM INSULIN USE: ICD-10-CM

## 2024-03-01 LAB
ANION GAP SERPL CALCULATED.3IONS-SCNC: 5 MMOL/L
BASOPHILS # BLD AUTO: 0.07 THOUSANDS/ÂΜL (ref 0–0.1)
BASOPHILS NFR BLD AUTO: 1 % (ref 0–1)
BUN SERPL-MCNC: 17 MG/DL (ref 5–25)
CALCIUM SERPL-MCNC: 8.8 MG/DL (ref 8.4–10.2)
CHLORIDE SERPL-SCNC: 107 MMOL/L (ref 96–108)
CO2 SERPL-SCNC: 26 MMOL/L (ref 21–32)
CREAT SERPL-MCNC: 0.41 MG/DL (ref 0.6–1.3)
EOSINOPHIL # BLD AUTO: 0.33 THOUSAND/ÂΜL (ref 0–0.61)
EOSINOPHIL NFR BLD AUTO: 6 % (ref 0–6)
ERYTHROCYTE [DISTWIDTH] IN BLOOD BY AUTOMATED COUNT: 16.3 % (ref 11.6–15.1)
GFR SERPL CREATININE-BSD FRML MDRD: 125 ML/MIN/1.73SQ M
GLUCOSE SERPL-MCNC: 76 MG/DL (ref 65–140)
HCT VFR BLD AUTO: 30.7 % (ref 36.5–49.3)
HGB BLD-MCNC: 9 G/DL (ref 12–17)
IMM GRANULOCYTES # BLD AUTO: 0.01 THOUSAND/UL (ref 0–0.2)
IMM GRANULOCYTES NFR BLD AUTO: 0 % (ref 0–2)
LYMPHOCYTES # BLD AUTO: 1.94 THOUSANDS/ÂΜL (ref 0.6–4.47)
LYMPHOCYTES NFR BLD AUTO: 37 % (ref 14–44)
MCH RBC QN AUTO: 22.4 PG (ref 26.8–34.3)
MCHC RBC AUTO-ENTMCNC: 29.3 G/DL (ref 31.4–37.4)
MCV RBC AUTO: 76 FL (ref 82–98)
MONOCYTES # BLD AUTO: 0.35 THOUSAND/ÂΜL (ref 0.17–1.22)
MONOCYTES NFR BLD AUTO: 7 % (ref 4–12)
NEUTROPHILS # BLD AUTO: 2.53 THOUSANDS/ÂΜL (ref 1.85–7.62)
NEUTS SEG NFR BLD AUTO: 49 % (ref 43–75)
NRBC BLD AUTO-RTO: 0 /100 WBCS
PLATELET # BLD AUTO: 453 THOUSANDS/UL (ref 149–390)
PMV BLD AUTO: 10.5 FL (ref 8.9–12.7)
POTASSIUM SERPL-SCNC: 4 MMOL/L (ref 3.5–5.3)
RBC # BLD AUTO: 4.02 MILLION/UL (ref 3.88–5.62)
SODIUM SERPL-SCNC: 138 MMOL/L (ref 135–147)
VANCOMYCIN TROUGH SERPL-MCNC: 18.7 UG/ML (ref 10–20)
WBC # BLD AUTO: 5.23 THOUSAND/UL (ref 4.31–10.16)

## 2024-03-01 PROCEDURE — 80202 ASSAY OF VANCOMYCIN: CPT | Performed by: INTERNAL MEDICINE

## 2024-03-01 PROCEDURE — 99213 OFFICE O/P EST LOW 20 MIN: CPT | Performed by: NURSE PRACTITIONER

## 2024-03-01 PROCEDURE — 85025 COMPLETE CBC W/AUTO DIFF WBC: CPT | Performed by: INTERNAL MEDICINE

## 2024-03-01 PROCEDURE — 80048 BASIC METABOLIC PNL TOTAL CA: CPT | Performed by: INTERNAL MEDICINE

## 2024-03-01 PROCEDURE — G0299 HHS/HOSPICE OF RN EA 15 MIN: HCPCS

## 2024-03-01 NOTE — TELEPHONE ENCOUNTER
Patients lab results in [facility]. Dr. Salomon reviewed labs results while in office. Per Dr. Salomon no change to current vancomycin dose.       Called [facility] pharmacy and spoke with Nicol today.  Informed Nicol Per Dr. Salomon no change to current vancomycin dose.   Nicol verbalizes understanding at this time.

## 2024-03-04 ENCOUNTER — HOME CARE VISIT (OUTPATIENT)
Dept: HOME HEALTH SERVICES | Facility: HOME HEALTHCARE | Age: 64
End: 2024-03-04
Payer: MEDICARE

## 2024-03-04 ENCOUNTER — LAB REQUISITION (OUTPATIENT)
Dept: LAB | Facility: HOSPITAL | Age: 64
End: 2024-03-04
Payer: MEDICARE

## 2024-03-04 VITALS
OXYGEN SATURATION: 97 % | SYSTOLIC BLOOD PRESSURE: 118 MMHG | RESPIRATION RATE: 18 BRPM | HEART RATE: 78 BPM | DIASTOLIC BLOOD PRESSURE: 68 MMHG | TEMPERATURE: 97 F

## 2024-03-04 VITALS
TEMPERATURE: 97.4 F | RESPIRATION RATE: 18 BRPM | OXYGEN SATURATION: 97 % | SYSTOLIC BLOOD PRESSURE: 110 MMHG | HEART RATE: 78 BPM | DIASTOLIC BLOOD PRESSURE: 70 MMHG

## 2024-03-04 DIAGNOSIS — M86.9 OSTEOMYELITIS, UNSPECIFIED (HCC): ICD-10-CM

## 2024-03-04 LAB
ANION GAP SERPL CALCULATED.3IONS-SCNC: 5 MMOL/L
BASOPHILS # BLD AUTO: 0.06 THOUSANDS/ÂΜL (ref 0–0.1)
BASOPHILS NFR BLD AUTO: 1 % (ref 0–1)
BUN SERPL-MCNC: 14 MG/DL (ref 5–25)
CALCIUM SERPL-MCNC: 8.7 MG/DL (ref 8.4–10.2)
CHLORIDE SERPL-SCNC: 109 MMOL/L (ref 96–108)
CO2 SERPL-SCNC: 25 MMOL/L (ref 21–32)
CREAT SERPL-MCNC: 0.34 MG/DL (ref 0.6–1.3)
EOSINOPHIL # BLD AUTO: 0.34 THOUSAND/ÂΜL (ref 0–0.61)
EOSINOPHIL NFR BLD AUTO: 7 % (ref 0–6)
ERYTHROCYTE [DISTWIDTH] IN BLOOD BY AUTOMATED COUNT: 16.4 % (ref 11.6–15.1)
GFR SERPL CREATININE-BSD FRML MDRD: 135 ML/MIN/1.73SQ M
GLUCOSE SERPL-MCNC: 70 MG/DL (ref 65–140)
HCT VFR BLD AUTO: 29.8 % (ref 36.5–49.3)
HGB BLD-MCNC: 8.6 G/DL (ref 12–17)
IMM GRANULOCYTES # BLD AUTO: 0.01 THOUSAND/UL (ref 0–0.2)
IMM GRANULOCYTES NFR BLD AUTO: 0 % (ref 0–2)
LYMPHOCYTES # BLD AUTO: 1.8 THOUSANDS/ÂΜL (ref 0.6–4.47)
LYMPHOCYTES NFR BLD AUTO: 35 % (ref 14–44)
MCH RBC QN AUTO: 22 PG (ref 26.8–34.3)
MCHC RBC AUTO-ENTMCNC: 28.9 G/DL (ref 31.4–37.4)
MCV RBC AUTO: 76 FL (ref 82–98)
MONOCYTES # BLD AUTO: 0.36 THOUSAND/ÂΜL (ref 0.17–1.22)
MONOCYTES NFR BLD AUTO: 7 % (ref 4–12)
NEUTROPHILS # BLD AUTO: 2.59 THOUSANDS/ÂΜL (ref 1.85–7.62)
NEUTS SEG NFR BLD AUTO: 50 % (ref 43–75)
NRBC BLD AUTO-RTO: 0 /100 WBCS
PLATELET # BLD AUTO: 390 THOUSANDS/UL (ref 149–390)
PMV BLD AUTO: 10.8 FL (ref 8.9–12.7)
POTASSIUM SERPL-SCNC: 4 MMOL/L (ref 3.5–5.3)
RBC # BLD AUTO: 3.91 MILLION/UL (ref 3.88–5.62)
SODIUM SERPL-SCNC: 139 MMOL/L (ref 135–147)
VANCOMYCIN TROUGH SERPL-MCNC: 18.3 UG/ML (ref 10–20)
WBC # BLD AUTO: 5.16 THOUSAND/UL (ref 4.31–10.16)

## 2024-03-04 PROCEDURE — G0299 HHS/HOSPICE OF RN EA 15 MIN: HCPCS

## 2024-03-04 PROCEDURE — 80202 ASSAY OF VANCOMYCIN: CPT | Performed by: INTERNAL MEDICINE

## 2024-03-04 PROCEDURE — 80048 BASIC METABOLIC PNL TOTAL CA: CPT | Performed by: INTERNAL MEDICINE

## 2024-03-04 PROCEDURE — 85025 COMPLETE CBC W/AUTO DIFF WBC: CPT | Performed by: INTERNAL MEDICINE

## 2024-03-06 ENCOUNTER — HOME CARE VISIT (OUTPATIENT)
Dept: HOME HEALTH SERVICES | Facility: HOME HEALTHCARE | Age: 64
End: 2024-03-06
Payer: MEDICARE

## 2024-03-06 PROCEDURE — G0299 HHS/HOSPICE OF RN EA 15 MIN: HCPCS

## 2024-03-07 VITALS
TEMPERATURE: 97.3 F | OXYGEN SATURATION: 97 % | HEART RATE: 76 BPM | DIASTOLIC BLOOD PRESSURE: 60 MMHG | RESPIRATION RATE: 18 BRPM | SYSTOLIC BLOOD PRESSURE: 110 MMHG

## 2024-03-08 ENCOUNTER — HOME CARE VISIT (OUTPATIENT)
Dept: HOME HEALTH SERVICES | Facility: HOME HEALTHCARE | Age: 64
End: 2024-03-08
Payer: MEDICARE

## 2024-03-08 PROCEDURE — G0299 HHS/HOSPICE OF RN EA 15 MIN: HCPCS

## 2024-03-10 VITALS
SYSTOLIC BLOOD PRESSURE: 120 MMHG | OXYGEN SATURATION: 97 % | HEART RATE: 74 BPM | TEMPERATURE: 97.2 F | RESPIRATION RATE: 18 BRPM | DIASTOLIC BLOOD PRESSURE: 60 MMHG

## 2024-03-10 VITALS
RESPIRATION RATE: 18 BRPM | SYSTOLIC BLOOD PRESSURE: 110 MMHG | HEART RATE: 82 BPM | DIASTOLIC BLOOD PRESSURE: 60 MMHG | TEMPERATURE: 96.9 F | OXYGEN SATURATION: 97 %

## 2024-03-11 ENCOUNTER — HOME CARE VISIT (OUTPATIENT)
Dept: HOME HEALTH SERVICES | Facility: HOME HEALTHCARE | Age: 64
End: 2024-03-11
Payer: MEDICARE

## 2024-03-11 ENCOUNTER — TELEPHONE (OUTPATIENT)
Dept: FAMILY MEDICINE CLINIC | Facility: CLINIC | Age: 64
End: 2024-03-11

## 2024-03-11 ENCOUNTER — TELEPHONE (OUTPATIENT)
Age: 64
End: 2024-03-11

## 2024-03-11 PROCEDURE — G0299 HHS/HOSPICE OF RN EA 15 MIN: HCPCS

## 2024-03-11 NOTE — TELEPHONE ENCOUNTER
PCP SIGNATURE NEEDED FOR [employer]/Certificate Of Medical Necessity  FORM RECEIVED VIA FAX AND PLACED IN PCP FOLDER TO BE DELIVERED AT ASSIGNED TIMES.    Received: 3/7/24  Account#: [id number]     No

## 2024-03-11 NOTE — TELEPHONE ENCOUNTER
Nicol with [facility] calling to confirm there is order to DC PICC today. Confirmed order is in to DC PICC today after last dose of Vancomycin. No further questions.

## 2024-03-13 ENCOUNTER — HOME CARE VISIT (OUTPATIENT)
Dept: HOME HEALTH SERVICES | Facility: HOME HEALTHCARE | Age: 64
End: 2024-03-13
Payer: MEDICARE

## 2024-03-13 PROCEDURE — G0299 HHS/HOSPICE OF RN EA 15 MIN: HCPCS

## 2024-03-14 VITALS
TEMPERATURE: 97.6 F | HEART RATE: 72 BPM | RESPIRATION RATE: 18 BRPM | SYSTOLIC BLOOD PRESSURE: 110 MMHG | OXYGEN SATURATION: 97 % | DIASTOLIC BLOOD PRESSURE: 60 MMHG

## 2024-03-15 ENCOUNTER — HOME CARE VISIT (OUTPATIENT)
Dept: HOME HEALTH SERVICES | Facility: HOME HEALTHCARE | Age: 64
End: 2024-03-15
Payer: MEDICARE

## 2024-03-15 PROCEDURE — G0299 HHS/HOSPICE OF RN EA 15 MIN: HCPCS

## 2024-03-16 VITALS
OXYGEN SATURATION: 97 % | DIASTOLIC BLOOD PRESSURE: 70 MMHG | RESPIRATION RATE: 18 BRPM | SYSTOLIC BLOOD PRESSURE: 116 MMHG | TEMPERATURE: 97.3 F | HEART RATE: 78 BPM

## 2024-03-18 ENCOUNTER — HOME CARE VISIT (OUTPATIENT)
Dept: HOME HEALTH SERVICES | Facility: HOME HEALTHCARE | Age: 64
End: 2024-03-18
Payer: MEDICARE

## 2024-03-18 VITALS
OXYGEN SATURATION: 97 % | DIASTOLIC BLOOD PRESSURE: 68 MMHG | RESPIRATION RATE: 18 BRPM | HEART RATE: 70 BPM | TEMPERATURE: 97.6 F | SYSTOLIC BLOOD PRESSURE: 118 MMHG

## 2024-03-18 DIAGNOSIS — F11.20 CONTINUOUS OPIOID DEPENDENCE (HCC): ICD-10-CM

## 2024-03-18 DIAGNOSIS — M86.68 OTHER CHRONIC OSTEOMYELITIS, OTHER SITE (HCC): ICD-10-CM

## 2024-03-18 DIAGNOSIS — M54.50 CHRONIC LOW BACK PAIN WITHOUT SCIATICA, UNSPECIFIED BACK PAIN LATERALITY: ICD-10-CM

## 2024-03-18 DIAGNOSIS — G89.29 CHRONIC LOW BACK PAIN WITHOUT SCIATICA, UNSPECIFIED BACK PAIN LATERALITY: ICD-10-CM

## 2024-03-19 RX ORDER — OXYCODONE HYDROCHLORIDE 15 MG/1
15 TABLET ORAL EVERY 4 HOURS PRN
Qty: 120 TABLET | Refills: 0 | Status: SHIPPED | OUTPATIENT
Start: 2024-03-19

## 2024-03-19 NOTE — PROGRESS NOTES
"On March 19, 2024, Oncology Social Worker Eileen Clements contacted pt. via telephone to follow up on referral from surgical oncologist.    OSW Tyree introduced herself, role and reason for call.  Pt. shared she's \"tired but I am doing good.\"  Pt. denies barriers to care at this time.  OSW Tyree encouraged pt. to contact her in the future if she has any questions and/or needs support.       " The wound was reexamined this morning  On further examination the superior wound has a good base tissue  There is no necrotic tissue for aggressive debridement  The inferior wound tracks towards the perineum and towards the penis  There is some exposed bone consistent with chronic osteomyelitis  In addition a Solomon catheter was easily palpated and pulled into the wound  Will discuss with Urology options

## 2024-03-20 ENCOUNTER — HOME CARE VISIT (OUTPATIENT)
Dept: HOME HEALTH SERVICES | Facility: HOME HEALTHCARE | Age: 64
End: 2024-03-20
Payer: MEDICARE

## 2024-03-20 PROCEDURE — G0299 HHS/HOSPICE OF RN EA 15 MIN: HCPCS

## 2024-03-21 ENCOUNTER — TELEPHONE (OUTPATIENT)
Dept: HOME HEALTH SERVICES | Facility: HOME HEALTHCARE | Age: 64
End: 2024-03-21

## 2024-03-22 ENCOUNTER — HOME CARE VISIT (OUTPATIENT)
Dept: HOME HEALTH SERVICES | Facility: HOME HEALTHCARE | Age: 64
End: 2024-03-22
Payer: MEDICARE

## 2024-03-22 VITALS
HEART RATE: 72 BPM | TEMPERATURE: 97.2 F | RESPIRATION RATE: 16 BRPM | OXYGEN SATURATION: 98 % | SYSTOLIC BLOOD PRESSURE: 110 MMHG | DIASTOLIC BLOOD PRESSURE: 68 MMHG

## 2024-03-25 ENCOUNTER — HOME CARE VISIT (OUTPATIENT)
Dept: HOME HEALTH SERVICES | Facility: HOME HEALTHCARE | Age: 64
End: 2024-03-25
Payer: MEDICARE

## 2024-03-25 PROCEDURE — G0299 HHS/HOSPICE OF RN EA 15 MIN: HCPCS

## 2024-03-27 ENCOUNTER — HOME CARE VISIT (OUTPATIENT)
Dept: HOME HEALTH SERVICES | Facility: HOME HEALTHCARE | Age: 64
End: 2024-03-27
Payer: MEDICARE

## 2024-03-27 VITALS
OXYGEN SATURATION: 97 % | SYSTOLIC BLOOD PRESSURE: 118 MMHG | HEART RATE: 78 BPM | RESPIRATION RATE: 18 BRPM | DIASTOLIC BLOOD PRESSURE: 70 MMHG | TEMPERATURE: 97.4 F

## 2024-03-27 PROCEDURE — G0299 HHS/HOSPICE OF RN EA 15 MIN: HCPCS

## 2024-03-29 ENCOUNTER — HOME CARE VISIT (OUTPATIENT)
Dept: HOME HEALTH SERVICES | Facility: HOME HEALTHCARE | Age: 64
End: 2024-03-29
Payer: MEDICARE

## 2024-03-29 PROCEDURE — G0299 HHS/HOSPICE OF RN EA 15 MIN: HCPCS

## 2024-03-31 VITALS
TEMPERATURE: 97.4 F | OXYGEN SATURATION: 97 % | HEART RATE: 72 BPM | RESPIRATION RATE: 18 BRPM | DIASTOLIC BLOOD PRESSURE: 68 MMHG | SYSTOLIC BLOOD PRESSURE: 110 MMHG

## 2024-04-01 ENCOUNTER — HOME CARE VISIT (OUTPATIENT)
Dept: HOME HEALTH SERVICES | Facility: HOME HEALTHCARE | Age: 64
End: 2024-04-01
Payer: MEDICARE

## 2024-04-01 VITALS
RESPIRATION RATE: 16 BRPM | HEART RATE: 74 BPM | TEMPERATURE: 97.3 F | DIASTOLIC BLOOD PRESSURE: 60 MMHG | OXYGEN SATURATION: 96 % | SYSTOLIC BLOOD PRESSURE: 120 MMHG

## 2024-04-01 PROCEDURE — G0299 HHS/HOSPICE OF RN EA 15 MIN: HCPCS

## 2024-04-02 ENCOUNTER — OFFICE VISIT (OUTPATIENT)
Dept: PLASTIC SURGERY | Facility: CLINIC | Age: 64
End: 2024-04-02

## 2024-04-02 ENCOUNTER — HOME CARE VISIT (OUTPATIENT)
Dept: HOME HEALTH SERVICES | Facility: HOME HEALTHCARE | Age: 64
End: 2024-04-02
Payer: MEDICARE

## 2024-04-02 DIAGNOSIS — L89.134 STAGE IV PRESSURE ULCER OF RIGHT LOWER BACK (HCC): Primary | ICD-10-CM

## 2024-04-02 DIAGNOSIS — Z09 FOLLOW-UP FOR PLASTIC SURGERY: ICD-10-CM

## 2024-04-02 PROCEDURE — 99024 POSTOP FOLLOW-UP VISIT: CPT

## 2024-04-02 NOTE — PROGRESS NOTES
[facility] Plastic and Reconstructive Surgery  [address], [address], [place] [address]  [phone]    Patient Identification: Rikki Arguello is a 64 y.o. male     History of Present Illness: The patient is a 64 y.o.  year-old male  who presents to the office for post-op visit. Patient is 68 days s/p Resection Rib R Chest Wall Resection/rib Resection/reconstruction - Right and latissimus flap  on 1/25/2024 by Dr. Bell and Dr. Maradiaga.     Patient is accompanied by his caretaker. Patient continues to use wheelchair to ambulate. States the wheelchair company will be replacing wheelchair in April 2024. He uses cushioning to offload pressure to the surgical site. He denies fevers, chills, pain or signs of infection. Applying scar cream to incisions daily.  Patient has no complaints at this time.    Past Medical History:   Diagnosis Date    Anemia     Blind     r eye    Chronic cystitis     Colostomy in place ([facility])     Detrusor sphincter dyssynergia     Diabetes mellitus ([facility])     Poorly controlled type 2; Last Assessed:  3/18/14    Erectile dysfunction     Frequency of urination     GERD (gastroesophageal reflux disease)     History of diabetes mellitus     History of osteomyelitis     Hx of leg amputation ([facility])     r high upper leg    Hyperlipidemia     Hypertension     Hypospadias     Incomplete bladder emptying     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([facility]) 9/16/2019    Neurogenic bladder     Paralysis ([facility])     Paraplegia ([facility])     Spinal cord cysts     Ulcer of sacral region ([facility])     Urge incontinence         Review of Systems  Constitutional: Denies fevers, chills or pain.  Skin: Denies any warmth, erythema, edema or mucopurulent drainage.     Physical Exam    Right trunk/Back: Incision site is healing well. Curvature of scar has an area of thin tissue. No obvious wounds.  No signs of infection or dehiscence. Well perfused flaps. Drain sites healing well. See media.    Assessment and Plan:  The patient is  an 64 y.o.  year-old male who presents to the office for post-op visit. Patient is 68 days s/p Resection Rib R Chest Wall Resection/rib Resection/reconstruction - Right and latissimus flap  on 1/25/2024 by Dr. Bell and Dr. Maradiaga    -At today's visit incisions examined. Healing well. Area of thin tissue at scar curvature. Discussed the importance of avoiding pressure to this area, as it can lead to development of pressure ulcer. Pt understands.  -Discussed wheelchair accommodations. Pt states he is to get a new wheelchair this month via insurance. Offered documentation if necessary for wheelchair controls to be on the left side rather than right. Pt will reach out if necessary.   -Discussed daily scar massage and use of silicone scar gel/tape to promote scar softening and flattening. The patient was educated on scar care and that it can take up to 1 year for full scar maturation.   -Continued encouragement for wheelchair accommodations  -Follow up in 2-3 months for an incision check.  -The patient is to call the office with any questions or concerns. All of the patient's questions were answered at this time and they agree with the plan of care.      Rhona Jiménez PA-C  [facility] Plastic and Reconstructive Surgery

## 2024-04-03 ENCOUNTER — HOME CARE VISIT (OUTPATIENT)
Dept: HOME HEALTH SERVICES | Facility: HOME HEALTHCARE | Age: 64
End: 2024-04-03
Payer: MEDICARE

## 2024-04-03 PROCEDURE — 400014 VN F/U

## 2024-04-03 PROCEDURE — G0299 HHS/HOSPICE OF RN EA 15 MIN: HCPCS

## 2024-04-04 VITALS
RESPIRATION RATE: 18 BRPM | TEMPERATURE: 97.8 F | OXYGEN SATURATION: 97 % | DIASTOLIC BLOOD PRESSURE: 70 MMHG | HEART RATE: 76 BPM | SYSTOLIC BLOOD PRESSURE: 118 MMHG

## 2024-04-05 ENCOUNTER — HOME CARE VISIT (OUTPATIENT)
Dept: HOME HEALTH SERVICES | Facility: HOME HEALTHCARE | Age: 64
End: 2024-04-05
Payer: MEDICARE

## 2024-04-05 PROCEDURE — G0299 HHS/HOSPICE OF RN EA 15 MIN: HCPCS

## 2024-04-07 VITALS
DIASTOLIC BLOOD PRESSURE: 80 MMHG | OXYGEN SATURATION: 97 % | TEMPERATURE: 97.2 F | RESPIRATION RATE: 18 BRPM | HEART RATE: 70 BPM | SYSTOLIC BLOOD PRESSURE: 110 MMHG

## 2024-04-07 VITALS
DIASTOLIC BLOOD PRESSURE: 68 MMHG | HEART RATE: 78 BPM | TEMPERATURE: 97.3 F | RESPIRATION RATE: 16 BRPM | SYSTOLIC BLOOD PRESSURE: 110 MMHG | OXYGEN SATURATION: 96 %

## 2024-04-08 ENCOUNTER — HOME CARE VISIT (OUTPATIENT)
Dept: HOME HEALTH SERVICES | Facility: HOME HEALTHCARE | Age: 64
End: 2024-04-08
Payer: MEDICARE

## 2024-04-08 ENCOUNTER — TELEPHONE (OUTPATIENT)
Age: 64
End: 2024-04-08

## 2024-04-08 PROCEDURE — G0299 HHS/HOSPICE OF RN EA 15 MIN: HCPCS

## 2024-04-08 NOTE — TELEPHONE ENCOUNTER
Tex called requesting any records from 2023 be faxed to her. Advised that we have no records on file from 2023.

## 2024-04-10 ENCOUNTER — HOME CARE VISIT (OUTPATIENT)
Dept: HOME HEALTH SERVICES | Facility: HOME HEALTHCARE | Age: 64
End: 2024-04-10
Payer: MEDICARE

## 2024-04-10 PROCEDURE — G0299 HHS/HOSPICE OF RN EA 15 MIN: HCPCS

## 2024-04-11 VITALS
OXYGEN SATURATION: 96 % | DIASTOLIC BLOOD PRESSURE: 70 MMHG | TEMPERATURE: 97.8 F | SYSTOLIC BLOOD PRESSURE: 118 MMHG | HEART RATE: 72 BPM | RESPIRATION RATE: 18 BRPM

## 2024-04-12 ENCOUNTER — HOME CARE VISIT (OUTPATIENT)
Dept: HOME HEALTH SERVICES | Facility: HOME HEALTHCARE | Age: 64
End: 2024-04-12
Payer: MEDICARE

## 2024-04-12 PROCEDURE — G0299 HHS/HOSPICE OF RN EA 15 MIN: HCPCS

## 2024-04-15 ENCOUNTER — HOME CARE VISIT (OUTPATIENT)
Dept: HOME HEALTH SERVICES | Facility: HOME HEALTHCARE | Age: 64
End: 2024-04-15
Payer: MEDICARE

## 2024-04-15 ENCOUNTER — TELEPHONE (OUTPATIENT)
Dept: HOME HEALTH SERVICES | Facility: HOME HEALTHCARE | Age: 64
End: 2024-04-15

## 2024-04-15 VITALS
RESPIRATION RATE: 20 BRPM | OXYGEN SATURATION: 97 % | DIASTOLIC BLOOD PRESSURE: 70 MMHG | SYSTOLIC BLOOD PRESSURE: 118 MMHG | TEMPERATURE: 97.8 F | HEART RATE: 76 BPM

## 2024-04-15 VITALS
TEMPERATURE: 97.8 F | RESPIRATION RATE: 18 BRPM | DIASTOLIC BLOOD PRESSURE: 70 MMHG | SYSTOLIC BLOOD PRESSURE: 110 MMHG | OXYGEN SATURATION: 97 % | HEART RATE: 78 BPM

## 2024-04-17 ENCOUNTER — HOME CARE VISIT (OUTPATIENT)
Dept: HOME HEALTH SERVICES | Facility: HOME HEALTHCARE | Age: 64
End: 2024-04-17
Payer: MEDICARE

## 2024-04-17 VITALS
TEMPERATURE: 97.9 F | DIASTOLIC BLOOD PRESSURE: 72 MMHG | SYSTOLIC BLOOD PRESSURE: 118 MMHG | RESPIRATION RATE: 18 BRPM | OXYGEN SATURATION: 97 % | HEART RATE: 78 BPM

## 2024-04-17 PROCEDURE — G0299 HHS/HOSPICE OF RN EA 15 MIN: HCPCS

## 2024-04-18 ENCOUNTER — TELEPHONE (OUTPATIENT)
Dept: HOME HEALTH SERVICES | Facility: HOME HEALTHCARE | Age: 64
End: 2024-04-18

## 2024-04-19 ENCOUNTER — HOME CARE VISIT (OUTPATIENT)
Dept: HOME HEALTH SERVICES | Facility: HOME HEALTHCARE | Age: 64
End: 2024-04-19
Payer: MEDICARE

## 2024-04-19 ENCOUNTER — OFFICE VISIT (OUTPATIENT)
Dept: WOUND CARE | Facility: CLINIC | Age: 64
End: 2024-04-19
Payer: MEDICARE

## 2024-04-19 VITALS
DIASTOLIC BLOOD PRESSURE: 70 MMHG | RESPIRATION RATE: 14 BRPM | HEART RATE: 62 BPM | SYSTOLIC BLOOD PRESSURE: 100 MMHG | TEMPERATURE: 95.3 F

## 2024-04-19 DIAGNOSIS — G82.20 PARAPLEGIC SPINAL PARALYSIS (HCC): Primary | ICD-10-CM

## 2024-04-19 DIAGNOSIS — M54.50 CHRONIC LOW BACK PAIN WITHOUT SCIATICA, UNSPECIFIED BACK PAIN LATERALITY: ICD-10-CM

## 2024-04-19 DIAGNOSIS — G89.29 CHRONIC LOW BACK PAIN WITHOUT SCIATICA, UNSPECIFIED BACK PAIN LATERALITY: ICD-10-CM

## 2024-04-19 DIAGNOSIS — M86.68 OTHER CHRONIC OSTEOMYELITIS, OTHER SITE (HCC): ICD-10-CM

## 2024-04-19 DIAGNOSIS — F11.20 CONTINUOUS OPIOID DEPENDENCE (HCC): ICD-10-CM

## 2024-04-19 DIAGNOSIS — L89.154 STAGE IV PRESSURE ULCER OF SACRAL REGION (HCC): Chronic | ICD-10-CM

## 2024-04-19 DIAGNOSIS — L89.223 STAGE III PRESSURE ULCER OF LEFT HIP (HCC): ICD-10-CM

## 2024-04-19 PROCEDURE — 99213 OFFICE O/P EST LOW 20 MIN: CPT | Performed by: SURGERY

## 2024-04-19 PROCEDURE — 11042 DBRDMT SUBQ TIS 1ST 20SQCM/<: CPT | Performed by: SURGERY

## 2024-04-19 RX ORDER — OXYCODONE HYDROCHLORIDE 15 MG/1
15 TABLET ORAL EVERY 4 HOURS PRN
Qty: 40 TABLET | Refills: 0 | Status: SHIPPED | OUTPATIENT
Start: 2024-04-19

## 2024-04-19 RX ORDER — OXYCODONE HYDROCHLORIDE 15 MG/1
15 TABLET ORAL EVERY 4 HOURS PRN
Qty: 120 TABLET | Refills: 0 | Status: CANCELLED | OUTPATIENT
Start: 2024-04-19

## 2024-04-19 RX ORDER — NALOXONE HYDROCHLORIDE 4 MG/.1ML
SPRAY NASAL
Qty: 1 EACH | Refills: 1 | Status: SHIPPED | OUTPATIENT
Start: 2024-04-19 | End: 2025-04-19

## 2024-04-19 NOTE — ASSESSMENT & PLAN NOTE
The sacral wound connects to the perianal area under the skin bridge.  The base was debrided of some slough.  There is no evidence of green drainage like previous.  Will continue with alginate dressing changes.  Follow-up in 1 month.

## 2024-04-19 NOTE — PROGRESS NOTES
Wound Procedure Treatment Pressure Injury Sacrum    Performed by: Syd Del Rosario RN  Authorized by: Tex Yan MD  Associated wounds:   Wound 04/19/24 Pressure Injury Sacrum  Wound cleansed with:  NSS  Applied primary dressing:  Calcium alginate  Applied secondary dressing:  Gauze and ABD  Wound secured with:  Tape  Home care instructions:  Calcium alginate applied to wound bed followed by fluffed 4x4 and ABD and tape

## 2024-04-19 NOTE — PROGRESS NOTES
Wound Procedure Treatment Pressure Injury Left Hip    Performed by: Syd Del Rosario RN  Authorized by: Tex Yan MD  Associated wounds:   Wound 01/18/24 Pressure Injury Hip Left  Wound cleansed with:  NSS  Applied primary dressing:  Silicone bordered foam

## 2024-04-19 NOTE — PROGRESS NOTES
"Patient ID: Rikki Arguello is a 64 y.o. male Date of Birth 1960     Chief Complaint  Chief Complaint   Patient presents with    Follow Up Wound Care Visit     Buttock and sacral wounds       Allergies  Patient has no known allergies.    Assessment:    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)  Hip wound is very healthy small open area.    Stage IV pressure ulcer of sacral region (HCC)  The sacral wound connects to the perianal area under the skin bridge.  The base was debrided of some slough.  There is no evidence of green drainage like previous.  Will continue with alginate dressing changes.  Follow-up in 1 month.         Diagnoses and all orders for this visit:    Paraplegic spinal paralysis (HCC)    Stage IV pressure ulcer of sacral region (HCC)    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)    Other orders  -     Debridement                Debridement   Wound 04/19/24 Pressure Injury Sacrum    Universal Protocol:  Consent: Verbal consent obtained.  Consent given by: patient  Time out: Immediately prior to procedure a \"time out\" was called to verify the correct patient, procedure, equipment, support staff and site/side marked as required.    Debridement Details  Performed by: physician  Debridement type: surgical  Level of debridement: subcutaneous tissue  Pain control: lidocaine 4%      Post-debridement measurements  Length (cm): 3.5  Width (cm): 10  Depth (cm): 1.9  Percent debrided: 20%  Surface Area (cm^2): 35  Area Debrided (cm^2): 7  Volume (cm^3): 66.5    Tissue and other material debrided: subcutaneous tissue  Devitalized tissue debrided: biofilm and slough  Instrument(s) utilized: curette  Procedural pain (0-10): 1  Post-procedural pain: 1   Response to treatment: procedure was tolerated well        Plan:     Wound 01/18/24 Hip Left (Active)   Wound Image Images linked 04/19/24 1052       Wound 04/19/24 Pressure Injury Sacrum (Active)   Wound Image Images linked 04/19/24 1058   Wound Description " Pink;Rolled edges;Pale 04/19/24 1058   Pressure Injury Stage 4 04/19/24 1058   Shelby-wound Assessment Scar Tissue;Hyperpigmented;Maceration 04/19/24 1058   Wound Length (cm) 3.5 cm (2 open areas which communicate under a 3.4 cm skin bridge) 04/19/24 1058   Wound Width (cm) 10 cm 04/19/24 1058   Wound Depth (cm) 1.9 cm 04/19/24 1058   Wound Surface Area (cm^2) 35 cm^2 04/19/24 1058   Wound Volume (cm^3) 66.5 cm^3 04/19/24 1058   Calculated Wound Volume (cm^3) 66.5 cm^3 04/19/24 1058   Drainage Amount Copious 04/19/24 1058   Drainage Description Yellow;Alva 04/19/24 1058       [REMOVED] Wound 08/26/20 Pressure Injury Buttocks Left (Removed)   Wound Image Images linked 04/19/24 1052       Wound 01/18/24 Hip Left (Active)   Date First Assessed/Time First Assessed: 01/18/24 1137   Location: Hip  Wound Location Orientation: Left       Wound 04/19/24 Pressure Injury Sacrum (Active)   Date First Assessed: 04/19/24   Primary Wound Type: Pressure Injury  Location: Sacrum  Wound Outcome: Palliative       [REMOVED] Wound 08/26/19 Buttocks Left;Distal;Lateral (Removed)   Resolved Date/Resolved Time: 08/26/20 1056  Date First Assessed/Time First Assessed: 08/26/19 1130   Pre-Existing Wound: Yes  Location: Buttocks  Wound Location Orientation: Left;Distal;Lateral  Wound Description (Comments): Deep ischial wound       [REMOVED] Wound 09/16/19 Buttocks Right;Distal (Removed)   Resolved Date/Resolved Time: 08/26/20 1055  Date First Assessed/Time First Assessed: 09/16/19 1657   Pre-Existing Wound: Yes  Location: Buttocks  Wound Location Orientation: Right;Distal       [REMOVED] Wound 10/28/19 Knee Left (Removed)   Resolved Date/Resolved Time: 08/26/20 1055  Date First Assessed/Time First Assessed: 10/28/19 0657   Pre-Existing Wound: Yes  Location: Knee  Wound Location Orientation: Left  Wound Description (Comments): round black  Dressing Status: Open to air       [REMOVED] Wound 10/28/19 Buttocks Left;Mid (Removed)   Resolved  Date/Resolved Time: 08/26/20 1056  Date First Assessed/Time First Assessed: 10/28/19 1108   Pre-Existing Wound: Yes  Location: Buttocks  Wound Location Orientation: Left;Mid  Wound Description (Comments): Stage 2 vs traumatic injury       [REMOVED] Wound 11/01/19 Other (Comment) N/A (Removed)   Resolved Date/Resolved Time: 08/26/20 1055  Date First Assessed/Time First Assessed: 11/01/19 1640   Location: Other (Comment)  Wound Location Orientation: N/A  Wound Description (Comments): scrotum dressed with exofin       [REMOVED] Wound 08/26/20 Pressure Injury Buttocks Left (Removed)   Resolved Date: 04/19/24  Date First Assessed/Time First Assessed: 08/26/20 1056   Primary Wound Type: Pressure Injury  Location: Buttocks  Wound Location Orientation: Left  Wound Outcome: (c) Converged       [REMOVED] Wound 08/26/20 Pressure Injury Hip Left (Removed)   Resolved Date: 10/04/23  Date First Assessed/Time First Assessed: 08/26/20 1057   Primary Wound Type: Pressure Injury  Location: Hip  Wound Location Orientation: Left  Wound Outcome: Palliative       [REMOVED] Wound 07/29/21 Pressure Injury Back Right;Lower;Lateral (Removed)   Resolved Date/Resolved Time: 01/23/24 1934  Date First Assessed: 07/29/21   Primary Wound Type: Pressure Injury  Location: Back  Wound Location Orientation: Right;Lower;Lateral  Wound Outcome: Healed       [REMOVED] Wound 11/05/21 Pressure Injury Sacrum (Removed)   Resolved Date: 04/19/24  Date First Assessed/Time First Assessed: 11/05/21 1059   Present on Original Admission: Yes  Primary Wound Type: Pressure Injury  Location: Sacrum  Wound Outcome: (c) Palliative       [REMOVED] Wound 05/27/22 Skin Tear Buttocks Left;Proximal (Removed)   Resolved Date/Resolved Time: 08/05/22 0851  Date First Assessed/Time First Assessed: 05/27/22 0946   Primary Wound Type: Skin Tear  Location: Buttocks  Wound Location Orientation: Left;Proximal  Wound Outcome: Healed       [REMOVED] Wound 03/17/23 Pressure Injury Hip  Lateral;Left;Proximal (Removed)   Resolved Date: 08/21/23  Date First Assessed/Time First Assessed: 03/17/23 1039   Primary Wound Type: Pressure Injury  Location: Hip  Wound Location Orientation: Lateral;Left;Proximal  Wound Outcome: Healed       [REMOVED] Wound 06/19/23 Pressure Injury Hip Left;Medial (Removed)   Resolved Date: 09/18/23  Date First Assessed: 06/19/23   Present on Original Admission: No  Primary Wound Type: Pressure Injury  Location: Hip  Wound Location Orientation: Left;Medial  Wound Description (Comments): granulation  yellow serous drainage ...       [REMOVED] Wound 10/13/23 Pressure Injury Finger (Comment which one) Right (Removed)   Resolved Date: 11/20/23  Date First Assessed/Time First Assessed: 10/13/23 1035   Primary Wound Type: Pressure Injury  Location: Finger (Comment which one)  Wound Location Orientation: Right  Wound Outcome: Healed       [REMOVED] Wound 01/17/24 Pressure Injury Flank Right;Upper (Removed)   Resolved Date: 02/02/24  Date First Assessed/Time First Assessed: 01/17/24 1926   Present on Original Admission: Yes  Primary Wound Type: Pressure Injury  Location: Flank  Wound Location Orientation: Right;Upper       [REMOVED] Wound 01/17/24 Knee Anterior;Left (Removed)   Resolved Date: 02/02/24  Date First Assessed/Time First Assessed: 01/17/24 1930   Location: Knee  Wound Location Orientation: Anterior;Left       [REMOVED] Wound 01/25/24 Chest Right (Removed)   Resolved Date: 03/15/24  Date First Assessed/Time First Assessed: 01/25/24 1205   Location: Chest  Wound Location Orientation: Right  Wound Description (Comments): addy [id number] negative pressure wound therapy system  Incision's 1st Dressing: DRESSING ...       Subjective:      .    Mr. Arguello is a 61-year-old gentleman with paraplegia and history of multiple pressure wounds with 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.  He had been rescheduled for a debridement and flap closure by plastics in August but  developed a new wound on his right mid to lower back chest wall.  This wound probes deep and has been closed on the outside but the tract has not been improving.  He had a CT scan that shows a deep tracking to the muscle but no fistulization to the internal thoracic cavity.    02/04/2022 he returns now for re-evaluation.  He still has had visiting nurses but has not been seen in the Wound Center since November.  He denies any change or complaint    03/11/2022 no changes since been seen last.  No new complaints.    04/22/2022 no issues.  No changes.    05/27/2022.  Doing well.  Denies fevers or chills.  No issues spoke about plastics a re-evaluation but he wants to wait for COVID to wane more and maybe next fall    07/22/2022.  He has not been seen here since May mostly due to transportation issues.  He has significant more pain and drainage on his right back chest wall wound.  Otherwise no changes    08/05/2022 denies fevers or chills.  Still in pain on the right side.    3/17/2023 he returns for evaluation.  He was seen in the wound center by another provider month or 2 ago.    6/9/2023.  Here for long-term follow-up.  No significant changes.    7/14/2023.  No changes.  Doing well.    9/22/2023 he had an outpatient CT scan.  The right chest wall wound has increasing drainage.  The dressings from the sacral area have some greenish color    10/13/2023.  He did receive a VAC.  He still has greenish drainage on the dressings    10/27/2023 no significant changes.  He states he did get his hospital mattress.  Tolerating the VAC well.    12/8/2023.  He has had significant creasing drainage from the VAC dressing from the right back chest wall wound.  Difficulty maintaining enough canisters for the VAC machine.    12/22/2023 he denies any change.  Denies any fevers or chills.  Denies any issues.    4/19/2024 he returns now for resumption of care.  Since being seen last he did get admitted to [facility]  underwent extensive debridement of the chest wall wound rib resection with latissimus dorsi flap closure.  That wound did very well and the wound is healed.  He is here for evaluation of his chronic sacral and hip wounds.        The following portions of the patient's history were reviewed and updated as appropriate: allergies, current medications, past family history, past medical history, past social history, past surgical history and problem list.    Review of Systems   Constitutional:  Negative for chills and fever.   HENT:  Negative for ear pain and sore throat.    Eyes:  Negative for pain and visual disturbance.   Respiratory:  Negative for cough and shortness of breath.    Cardiovascular:  Negative for chest pain.   Gastrointestinal:  Negative for abdominal pain and vomiting.   Skin:  Positive for wound. Negative for color change.   Neurological:  Negative for seizures and syncope.   Psychiatric/Behavioral:  Negative for agitation and behavioral problems.    All other systems reviewed and are negative.        Objective:       Wound 01/18/24 Hip Left (Active)   Wound Image Images linked 04/19/24 1052       Wound 04/19/24 Pressure Injury Sacrum (Active)   Wound Image Images linked 04/19/24 1058   Wound Description Pink;Rolled edges;Pale 04/19/24 1058   Pressure Injury Stage 4 04/19/24 1058   Shelby-wound Assessment Scar Tissue;Hyperpigmented;Maceration 04/19/24 1058   Wound Length (cm) 3.5 cm (2 open areas which communicate under a 3.4 cm skin bridge) 04/19/24 1058   Wound Width (cm) 10 cm 04/19/24 1058   Wound Depth (cm) 1.9 cm 04/19/24 1058   Wound Surface Area (cm^2) 35 cm^2 04/19/24 1058   Wound Volume (cm^3) 66.5 cm^3 04/19/24 1058   Calculated Wound Volume (cm^3) 66.5 cm^3 04/19/24 1058   Drainage Amount Copious 04/19/24 1058   Drainage Description Yellow;Alva 04/19/24 1058       [REMOVED] Wound 08/26/20 Pressure Injury Buttocks Left (Removed)   Wound Image Images linked 04/19/24 1052       /70    Pulse 62   Temp (!) 95.3 °F (35.2 °C)   Resp 14     Physical Exam  Vitals and nursing note reviewed. Exam conducted with a chaperone present.   Constitutional:       Appearance: Normal appearance.   Cardiovascular:      Rate and Rhythm: Normal rate and regular rhythm.   Pulmonary:      Effort: Pulmonary effort is normal.      Breath sounds: Normal breath sounds.   Abdominal:      General: There is no distension.      Palpations: Abdomen is soft. There is no mass.      Tenderness: There is no abdominal tenderness.      Comments: Ostomy   Neurological:      Mental Status: He is alert.   Psychiatric:         Mood and Affect: Mood normal.         Behavior: Behavior normal.           Wound Instructions:  Orders Placed This Encounter   Procedures    Debridement     This order was created via procedure documentation        Diagnosis ICD-10-CM Associated Orders   1. Paraplegic spinal paralysis ([facility])  G82.20       2. Stage IV pressure ulcer of sacral region ([facility])  L89.154       3. 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 ([facility])  L89.223

## 2024-04-19 NOTE — PATIENT INSTRUCTIONS
Orders Placed This Encounter   Procedures    Debridement Pressure Injury Sacrum     This order was created via procedure documentation    Wound cleansing and dressings     Wound cleansing and dressings   Wound cleansing and dressings          Shower, No. Do not get dressings wet.               Sacral wound:  Cleanse wounds with normal saline. Pat dry   Skin prep to gaby-wound  Apply Calcium Alginate to all wound surfaces and under skin bridge followed by fluffed 4 x 4's to keep alginate in place.   Cover with ABD's.   Change dressings daily and as needed for excessive drainage       Left hip wound  Cleanse with normal saline.   Cover with silicon bordered foam.   Change three times a week.      Keep pressure off of all wounds as much as possible, limit sitting in chair as much as possible       Continue VNA three x per week (wife will do in between) for dressing changes, except on the day the patient goes to the wound center.     OFF-LOADING   Avoid pressure at wound site. - all wounds, including right back   Wheelchair Cushion. - ROHO cushion   Do Not Sit for Long Periods of Time. - 1 hr max for meals only, otherwise in bed and turn side to side at least   every 3 hours or more frequently   Reposition at least every 1-2 hours while awake.       Follow up in 4 weeks Dr. Yna     Standing Status:   Future     Standing Expiration Date:   4/26/2024    Wound Procedure Treatment Pressure Injury Left Hip     This order was created via procedure documentation    Wound Procedure Treatment Pressure Injury Sacrum     This order was created via procedure documentation

## 2024-04-21 DIAGNOSIS — E11.9 TYPE 2 DIABETES MELLITUS WITHOUT COMPLICATION, WITHOUT LONG-TERM CURRENT USE OF INSULIN (HCC): ICD-10-CM

## 2024-04-22 ENCOUNTER — HOME CARE VISIT (OUTPATIENT)
Dept: HOME HEALTH SERVICES | Facility: HOME HEALTHCARE | Age: 64
End: 2024-04-22
Payer: MEDICARE

## 2024-04-22 VITALS
RESPIRATION RATE: 18 BRPM | SYSTOLIC BLOOD PRESSURE: 110 MMHG | OXYGEN SATURATION: 97 % | HEART RATE: 72 BPM | DIASTOLIC BLOOD PRESSURE: 80 MMHG | TEMPERATURE: 97.8 F

## 2024-04-22 PROCEDURE — G0299 HHS/HOSPICE OF RN EA 15 MIN: HCPCS

## 2024-04-22 RX ORDER — BLOOD SUGAR DIAGNOSTIC
1 STRIP MISCELLANEOUS DAILY
Qty: 100 EACH | Refills: 0 | Status: SHIPPED | OUTPATIENT
Start: 2024-04-22

## 2024-04-24 ENCOUNTER — HOME CARE VISIT (OUTPATIENT)
Dept: HOME HEALTH SERVICES | Facility: HOME HEALTHCARE | Age: 64
End: 2024-04-24
Payer: MEDICARE

## 2024-04-24 PROCEDURE — G0299 HHS/HOSPICE OF RN EA 15 MIN: HCPCS

## 2024-04-26 ENCOUNTER — HOME CARE VISIT (OUTPATIENT)
Dept: HOME HEALTH SERVICES | Facility: HOME HEALTHCARE | Age: 64
End: 2024-04-26
Payer: MEDICARE

## 2024-04-26 PROCEDURE — G0299 HHS/HOSPICE OF RN EA 15 MIN: HCPCS

## 2024-04-28 VITALS
RESPIRATION RATE: 18 BRPM | DIASTOLIC BLOOD PRESSURE: 70 MMHG | HEART RATE: 68 BPM | OXYGEN SATURATION: 98 % | SYSTOLIC BLOOD PRESSURE: 116 MMHG | TEMPERATURE: 97.4 F

## 2024-04-28 VITALS
TEMPERATURE: 97.3 F | SYSTOLIC BLOOD PRESSURE: 110 MMHG | DIASTOLIC BLOOD PRESSURE: 70 MMHG | OXYGEN SATURATION: 97 % | HEART RATE: 74 BPM | RESPIRATION RATE: 18 BRPM

## 2024-04-29 ENCOUNTER — HOME CARE VISIT (OUTPATIENT)
Dept: HOME HEALTH SERVICES | Facility: HOME HEALTHCARE | Age: 64
End: 2024-04-29
Payer: MEDICARE

## 2024-04-29 PROCEDURE — G0299 HHS/HOSPICE OF RN EA 15 MIN: HCPCS

## 2024-05-01 ENCOUNTER — HOME CARE VISIT (OUTPATIENT)
Dept: HOME HEALTH SERVICES | Facility: HOME HEALTHCARE | Age: 64
End: 2024-05-01
Payer: MEDICARE

## 2024-05-01 VITALS
DIASTOLIC BLOOD PRESSURE: 60 MMHG | OXYGEN SATURATION: 97 % | SYSTOLIC BLOOD PRESSURE: 110 MMHG | TEMPERATURE: 97.7 F | HEART RATE: 78 BPM | RESPIRATION RATE: 18 BRPM

## 2024-05-01 PROCEDURE — G0299 HHS/HOSPICE OF RN EA 15 MIN: HCPCS

## 2024-05-02 ENCOUNTER — HOME CARE VISIT (OUTPATIENT)
Dept: HOME HEALTH SERVICES | Facility: HOME HEALTHCARE | Age: 64
End: 2024-05-02
Payer: MEDICARE

## 2024-05-02 ENCOUNTER — OFFICE VISIT (OUTPATIENT)
Dept: FAMILY MEDICINE CLINIC | Facility: CLINIC | Age: 64
End: 2024-05-02

## 2024-05-02 ENCOUNTER — TELEPHONE (OUTPATIENT)
Dept: HOME HEALTH SERVICES | Facility: HOME HEALTHCARE | Age: 64
End: 2024-05-02

## 2024-05-02 VITALS
RESPIRATION RATE: 18 BRPM | HEIGHT: 67 IN | BODY MASS INDEX: 24.28 KG/M2 | DIASTOLIC BLOOD PRESSURE: 72 MMHG | OXYGEN SATURATION: 98 % | SYSTOLIC BLOOD PRESSURE: 110 MMHG | TEMPERATURE: 98.4 F | HEART RATE: 61 BPM

## 2024-05-02 DIAGNOSIS — G89.29 CHRONIC LOW BACK PAIN WITHOUT SCIATICA, UNSPECIFIED BACK PAIN LATERALITY: ICD-10-CM

## 2024-05-02 DIAGNOSIS — M86.68 OTHER CHRONIC OSTEOMYELITIS, OTHER SITE (HCC): ICD-10-CM

## 2024-05-02 DIAGNOSIS — K21.9 GASTROESOPHAGEAL REFLUX DISEASE WITHOUT ESOPHAGITIS: ICD-10-CM

## 2024-05-02 DIAGNOSIS — I10 BENIGN ESSENTIAL HYPERTENSION: Primary | ICD-10-CM

## 2024-05-02 DIAGNOSIS — Z12.11 COLON CANCER SCREENING: ICD-10-CM

## 2024-05-02 DIAGNOSIS — Z93.59 SUPRAPUBIC CATHETER (HCC): ICD-10-CM

## 2024-05-02 DIAGNOSIS — R30.0 DYSURIA: ICD-10-CM

## 2024-05-02 DIAGNOSIS — E11.9 TYPE 2 DIABETES MELLITUS WITHOUT COMPLICATION, WITHOUT LONG-TERM CURRENT USE OF INSULIN (HCC): ICD-10-CM

## 2024-05-02 DIAGNOSIS — F11.20 CONTINUOUS OPIOID DEPENDENCE (HCC): ICD-10-CM

## 2024-05-02 DIAGNOSIS — N30.90 CYSTITIS: ICD-10-CM

## 2024-05-02 DIAGNOSIS — M54.50 CHRONIC LOW BACK PAIN WITHOUT SCIATICA, UNSPECIFIED BACK PAIN LATERALITY: ICD-10-CM

## 2024-05-02 DIAGNOSIS — G82.20 PARAPLEGIC SPINAL PARALYSIS (HCC): ICD-10-CM

## 2024-05-02 LAB
SL AMB  POCT GLUCOSE, UA: NORMAL
SL AMB LEUKOCYTE ESTERASE,UA: 3
SL AMB POCT BILIRUBIN,UA: NORMAL
SL AMB POCT BLOOD,UA: NORMAL
SL AMB POCT CLARITY,UA: NORMAL
SL AMB POCT COLOR,UA: YELLOW
SL AMB POCT HEMOGLOBIN AIC: 5.3 (ref ?–6.5)
SL AMB POCT KETONES,UA: NORMAL
SL AMB POCT NITRITE,UA: NORMAL
SL AMB POCT PH,UA: 7.5
SL AMB POCT SPECIFIC GRAVITY,UA: 1.01
SL AMB POCT URINE PROTEIN: NORMAL
SL AMB POCT UROBILINOGEN: NORMAL

## 2024-05-02 PROCEDURE — 99215 OFFICE O/P EST HI 40 MIN: CPT | Performed by: NURSE PRACTITIONER

## 2024-05-02 PROCEDURE — 87186 SC STD MICRODIL/AGAR DIL: CPT | Performed by: NURSE PRACTITIONER

## 2024-05-02 PROCEDURE — 80307 DRUG TEST PRSMV CHEM ANLYZR: CPT | Performed by: NURSE PRACTITIONER

## 2024-05-02 PROCEDURE — 83036 HEMOGLOBIN GLYCOSYLATED A1C: CPT | Performed by: NURSE PRACTITIONER

## 2024-05-02 PROCEDURE — 80349 CANNABINOIDS NATURAL: CPT | Performed by: NURSE PRACTITIONER

## 2024-05-02 PROCEDURE — 3078F DIAST BP <80 MM HG: CPT | Performed by: NURSE PRACTITIONER

## 2024-05-02 PROCEDURE — 87077 CULTURE AEROBIC IDENTIFY: CPT | Performed by: NURSE PRACTITIONER

## 2024-05-02 PROCEDURE — 3074F SYST BP LT 130 MM HG: CPT | Performed by: NURSE PRACTITIONER

## 2024-05-02 PROCEDURE — 87086 URINE CULTURE/COLONY COUNT: CPT | Performed by: NURSE PRACTITIONER

## 2024-05-02 PROCEDURE — 81002 URINALYSIS NONAUTO W/O SCOPE: CPT | Performed by: NURSE PRACTITIONER

## 2024-05-02 RX ORDER — OXYCODONE HYDROCHLORIDE 15 MG/1
15 TABLET ORAL EVERY 6 HOURS PRN
Qty: 120 TABLET | Refills: 0 | Status: SHIPPED | OUTPATIENT
Start: 2024-05-02

## 2024-05-02 RX ORDER — ASPIRIN 81 MG/1
81 TABLET ORAL DAILY
Qty: 90 TABLET | Refills: 0 | Status: SHIPPED | OUTPATIENT
Start: 2024-05-02

## 2024-05-02 RX ORDER — OMEPRAZOLE 20 MG/1
20 CAPSULE, DELAYED RELEASE ORAL
Qty: 90 CAPSULE | Refills: 3 | Status: SHIPPED | OUTPATIENT
Start: 2024-05-02 | End: 2025-04-27

## 2024-05-02 RX ORDER — NALOXONE HYDROCHLORIDE 4 MG/.1ML
SPRAY NASAL
Qty: 1 EACH | Refills: 1 | Status: SHIPPED | OUTPATIENT
Start: 2024-05-02 | End: 2025-05-02

## 2024-05-02 RX ORDER — ACETAMINOPHEN 325 MG/1
650 TABLET ORAL EVERY 6 HOURS PRN
Qty: 40 TABLET | Refills: 0 | Status: SHIPPED | OUTPATIENT
Start: 2024-05-02

## 2024-05-02 RX ORDER — IBUPROFEN 800 MG/1
800 TABLET ORAL EVERY 8 HOURS PRN
Qty: 60 TABLET | Refills: 0 | Status: SHIPPED | OUTPATIENT
Start: 2024-05-02

## 2024-05-02 RX ORDER — SULFAMETHOXAZOLE AND TRIMETHOPRIM 800; 160 MG/1; MG/1
1 TABLET ORAL 2 TIMES DAILY
Qty: 6 TABLET | Refills: 0 | Status: SHIPPED | OUTPATIENT
Start: 2024-05-02 | End: 2024-05-05

## 2024-05-02 NOTE — ASSESSMENT & PLAN NOTE
Currently prescribed oxycodone 15 mg Q 4 hours PRN which comes to 135 MME/day, recommendations are to prescribe than 50 MME/ day to decrease risk of respiratory depression, overdose, death   Reviewed this with patient, he understands  We will slowly wean to a safer level of medication, within guidelines   For today will change medication from every 4 hours to every 6 hours (90 MME/ day) essentially a little over 10% decrease, we will slowly continue this taper

## 2024-05-02 NOTE — PROGRESS NOTES
Name: Rikki Arguello      : 1960      MRN: [id number]  Encounter Provider: KALPANA Yañez  Encounter Date: 2024   Encounter department: [facility] [place]    Assessment & Plan     1. Benign essential hypertension  Assessment & Plan:  BP within goal, not on any antihypertensive medications     Orders:  -     Comprehensive metabolic panel; Future  -     CBC and differential; Future    2. Type 2 diabetes mellitus without complication, without long-term current use of insulin (HCC)  Assessment & Plan:    Lab Results   Component Value Date    HGBA1C 5.3 2024     Well controlled, not on any medications     Orders:  -     POCT hemoglobin A1c  -     Hemoglobin A1C; Future  -     Lipid panel; Future  -     Comprehensive metabolic panel; Future  -     CBC and differential; Future  -     Albumin / creatinine urine ratio; Future    3. Continuous opioid dependence (HCC)  Assessment & Plan:  Currently prescribed oxycodone 15 mg Q 4 hours PRN which comes to 135 MME/day, recommendations are to prescribe than 50 MME/ day to decrease risk of respiratory depression, overdose, death   Reviewed this with patient, he understands  We will slowly wean to a safer level of medication, within guidelines   For today will change medication from every 4 hours to every 6 hours (90 MME/ day) essentially a little over 10% decrease, we will slowly continue this taper       Orders:  -     Drug Screen Routine w /Conf and Adulteration, urine.  -     oxyCODONE (ROXICODONE) 15 mg immediate release tablet; Take 1 tablet (15 mg total) by mouth every 6 (six) hours as needed for severe pain Max Daily Amount: 60 mg  -     naloxone (NARCAN) 4 mg/0.1 mL nasal spray; Administer 1 spray into a nostril. If no response after 2-3 minutes, give another dose in the other nostril using a new spray.    4. Cystitis  -     sulfamethoxazole-trimethoprim (BACTRIM DS) 800-160 mg per tablet; Take 1 tablet by mouth 2  (two) times a day for 3 days    5. Paraplegic spinal paralysis ([facility])  Assessment & Plan:  Wheelchair bound       6. Suprapubic catheter ([facility])  Assessment & Plan:  Today reports malodorous, discolored urine   Urine dip +leukocytes, nitrites   Will send for culture  Monthly catheter change, follow up with VNA/ urology       7. Dysuria  -     POCT urine dip  -     Urine culture    8. Colon cancer screening  -     Ambulatory Referral to Gastroenterology; Future    9. Gastroesophageal reflux disease without esophagitis  -     omeprazole (PriLOSEC) 20 mg delayed release capsule; Take 1 capsule (20 mg total) by mouth daily before breakfast    10. Other chronic osteomyelitis, other site ([facility])  -     acetaminophen (TYLENOL) 325 mg tablet; Take 2 tablets (650 mg total) by mouth every 6 (six) hours as needed for mild pain  -     oxyCODONE (ROXICODONE) 15 mg immediate release tablet; Take 1 tablet (15 mg total) by mouth every 6 (six) hours as needed for severe pain Max Daily Amount: 60 mg    11. Chronic low back pain without sciatica, unspecified back pain laterality  -     aspirin (RA Aspirin EC) 81 mg EC tablet; Take 1 tablet (81 mg total) by mouth daily  -     ibuprofen (MOTRIN) 800 mg tablet; Take 1 tablet (800 mg total) by mouth every 8 (eight) hours as needed for mild pain  -     oxyCODONE (ROXICODONE) 15 mg immediate release tablet; Take 1 tablet (15 mg total) by mouth every 6 (six) hours as needed for severe pain Max Daily Amount: 60 mg           Subjective     Patient is a 65 YO male who  has a past medical history of Anemia, Blind, Chronic cystitis, Colostomy in place ([facility]), Detrusor sphincter dyssynergia, Diabetes mellitus ([facility]), Erectile dysfunction, Frequency of urination, GERD (gastroesophageal reflux disease), History of diabetes mellitus, History of osteomyelitis, leg amputation ([facility]), Hyperlipidemia, Hypertension, Hypospadias, Incomplete bladder emptying, 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([facility]) (9/16/2019), Neurogenic  bladder, Paralysis (HCC), Paraplegia ([facility]), Spinal cord cysts, Ulcer of sacral region (HCC), and Urge incontinence and is here for a follow up.    Patient reports no change in condition. Continues to follow with wound care for pressure ulcers to the sacral area and right hip. VNA is assisting with dressing changes. He has no new concerns or issues.     The following portions of the patient's history were reviewed and updated as appropriate: allergies, current medications, past family history, past medical history, past social history, past surgical history and problem list.      Review of Systems   Constitutional:  Negative for chills and fever.   HENT:  Negative for ear pain and sore throat.    Eyes:  Negative for pain and visual disturbance.   Respiratory:  Negative for cough and shortness of breath.    Cardiovascular:  Negative for chest pain and palpitations.   Gastrointestinal:  Negative for abdominal pain and vomiting.   Genitourinary:  Negative for dysuria and hematuria.   Musculoskeletal:  Positive for arthralgias, back pain, gait problem, myalgias and neck pain.   Skin:  Negative for color change and rash.   Neurological:  Negative for seizures and syncope.   All other systems reviewed and are negative.      Past Medical History:   Diagnosis Date    Anemia     Blind     r eye    Chronic cystitis     Colostomy in place ([facility])     Detrusor sphincter dyssynergia     Diabetes mellitus ([facility])     Poorly controlled type 2; Last Assessed:  3/18/14    Erectile dysfunction     Frequency of urination     GERD (gastroesophageal reflux disease)     History of diabetes mellitus     History of osteomyelitis     Hx of leg amputation ([facility])     r high upper leg    Hyperlipidemia     Hypertension     Hypospadias     Incomplete bladder emptying     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([facility]) 9/16/2019    Neurogenic bladder     Paralysis (HCC)     Paraplegia (HCC)     Spinal cord cysts     Ulcer of sacral region (HCC)     Urge incontinence   "    Past Surgical History:   Procedure Laterality Date    AMPUTATION      At hip; Last Assessed:  1/19/16    BLADDER SURGERY      BONE RESECTION, RIB Right 1/25/2024    Procedure: RESECTION RIB R CHEST WALL RESECTION/RIB RESECTION/RECONSTRUCTION;  Surgeon: Srikanth Maradiaga MD;  Location: [facility];  Service: Thoracic    COLON SURGERY      llq ostomy pouch    COLOSTOMY      COMPLEX CYSTOMETROGRAM  2014    CT CYSTOGRAM  9/19/2019    CYSTOSCOPY  2014    FL CYSTOGRAM  1/5/2015    FL CYSTOGRAM  11/4/2014    FL CYSTOGRAM  10/24/2014    IR PICC REPOSITION  9/23/2019    IR SUPRAPUBIC CATHETER PLACEMENT  9/19/2019    LEG AMPUTATION      MEATOTOMY      PENILE PROSTHESIS  REMOVAL N/A 11/1/2019    Procedure: REMOVAL PROSTHESIS PENILE;  Surgeon: Cuong Phillips MD;  Location: [facility];  Service: Urology    PENILE PROSTHESIS IMPLANT  2011    MS ADJT/REARRGMT SCALP/ARM/LEG 10.1-30.0 SQ CM Left 5/1/2017    Procedure: POSTERIOR THIGH V-Y ADMANCEMENT;  Surgeon: Gal Cardozo MD;  Location: [facility];  Service: Plastics    MS MUSC MYOCUTANEOUS/FASCIOCUTANEOUS FLAP TRUNK Left 5/1/2017    Procedure: FLAP CLOSURE LEFT ISCHIAL WOUND and \"RIGHT\" ISCHIAL FLAP ADVANCEMENT * DEBRIDEMENT, VAC PLACEMENT ;  Surgeon: Gal Cardozo MD;  Location: [facility];  Service: Plastics    MS MUSC MYOCUTANEOUS/FASCIOCUTANEOUS FLAP TRUNK Left 9/27/2017    Procedure: gluteal myocutaneous rotational flap, posterior thigh v to y advancement- wound 5 x 2.5 x 8;  Surgeon: Gal Cardozo MD;  Location: [facility];  Service: Plastics    MS MUSC MYOCUTANEOUS/FASCIOCUTANEOUS FLAP TRUNK N/A 1/25/2024    Procedure: latissimus flap;  Surgeon: Alessandra Bell MD;  Location: [facility];  Service: Plastics    SPINE SURGERY      Lower back    UROFLOWMETRY SIMPLE / COMPLEX  2014     Family History   Problem Relation Age of Onset    No Known Problems Mother     No Known Problems Father      Social History     Socioeconomic History    Marital status: /Civil " Union     Spouse name: None    Number of children: None    Years of education: None    Highest education level: None   Occupational History    None   Tobacco Use    Smoking status: Former     Current packs/day: 0.00     Average packs/day: 0.5 packs/day for 10.0 years (5.0 ttl pk-yrs)     Types: Cigarettes     Start date:      Quit date:      Years since quittin.3    Smokeless tobacco: Never    Tobacco comments:     Onset date:  11/10/17   Vaping Use    Vaping status: Never Used   Substance and Sexual Activity    Alcohol use: Yes     Comment: Per [employer]:  Social drinker (Onset date:  11/10/17)    Drug use: No    Sexual activity: Not Currently     Partners: Female   Other Topics Concern    None   Social History Narrative    Native language German    Episcopal    Social history reviewed, unchanged     Social Determinants of Health     Financial Resource Strain: Low Risk  (2023)    Overall Financial Resource Strain (CARDIA)     Difficulty of Paying Living Expenses: Not very hard   Food Insecurity: No Food Insecurity (2023)    Hunger Vital Sign     Worried About Running Out of Food in the Last Year: Never true     Ran Out of Food in the Last Year: Never true   Transportation Needs: No Transportation Needs (2023)    PRAPARE - Transportation     Lack of Transportation (Medical): No     Lack of Transportation (Non-Medical): No   Physical Activity: Not on file   Stress: Not on file   Social Connections: Not on file   Intimate Partner Violence: Not on file   Housing Stability: Unknown (2024)    Housing Stability Vital Sign     Unable to Pay for Housing in the Last Year: No     Number of Places Lived in the Last Year: Not on file     Unstable Housing in the Last Year: No     Current Outpatient Medications on File Prior to Visit   Medication Sig    Accu-Chek FastClix Lancets MISC Inject under the skin daily    Blood Glucose Monitoring Suppl (ONE TOUCH ULTRA 2) w/Device KIT Use daily     Disposable Gloves (LATEX GLOVES LARGE) MISC Use as needed up to 3 times a day dispo 2 boxes    gabapentin (NEURONTIN) 400 mg capsule Take 1 capsule (400 mg total) by mouth 3 (three) times a day for 10 days    glucose blood (Accu-Chek Guide) test strip Use 1 each daily Use as instructed    Incontinence Supply Disposable (SUNBEAM INCONTINENT UNDER PAD) MISC Use 1 per week, dispense 4 reusable underpads    Incontinence Supply Disposable MISC by Does not apply route 2 (two) times a day    methocarbamol (ROBAXIN) 500 mg tablet Take 1 tablet (500 mg total) by mouth 4 (four) times a day for 10 days    [DISCONTINUED] acetaminophen (TYLENOL) 325 mg tablet Take 2 tablets (650 mg total) by mouth every 6 (six) hours as needed for mild pain    [DISCONTINUED] aspirin (RA Aspirin EC) 81 mg EC tablet Take 1 tablet (81 mg total) by mouth daily    [DISCONTINUED] ibuprofen (MOTRIN) 800 mg tablet Take 1 tablet (800 mg total) by mouth every 8 (eight) hours as needed for mild pain    [DISCONTINUED] naloxone (NARCAN) 4 mg/0.1 mL nasal spray Administer 1 spray into a nostril. If no response after 2-3 minutes, give another dose in the other nostril using a new spray.    [DISCONTINUED] oxyCODONE (ROXICODONE) 15 mg immediate release tablet Take 1 tablet (15 mg total) by mouth every 4 (four) hours as needed for moderate pain Max Daily Amount: 90 mg    [DISCONTINUED] Sodium Chloride Flush (NORMAL SALINE FLUSH IV) Inject 10 mL into a catheter in a vein every 12 (twelve) hours. EVERY 12HR BEFORE AND AFTER IV VANCO INFUSION.  Indications: FLUSH.    [DISCONTINUED] vancomycin (VANCOCIN) 1 g Inject 1,250 mg into a catheter in a vein every 12 (twelve) hours. VANCOMYCIN HCL 1250MG/100ML ECLIPSE BALL OVER 60MIN. q 12hrs  Indications: OSTEO.     No Known Allergies  Immunization History   Administered Date(s) Administered    COVID-19 MODERNA VACC 0.5 ML IM 05/18/2021, 06/15/2021    INFLUENZA 01/31/2014    Influenza, injectable, quadrivalent, preservative  "free 0.5 mL 02/21/2024    Influenza, recombinant, quadrivalent,injectable, preservative free 10/28/2019    Influenza, seasonal, injectable 10/23/2014    Pneumococcal Conjugate Vaccine 20-valent (Pcv20), Polysace 02/21/2024       Objective     /72 (BP Location: Left arm, Patient Position: Sitting, Cuff Size: Standard)   Pulse 61   Temp 98.4 °F (36.9 °C) (Temporal)   Resp 18   Ht 5' 7\" (1.702 m)   SpO2 98%   BMI 24.28 kg/m²     Physical Exam  Constitutional:       General: He is not in acute distress.     Appearance: He is not toxic-appearing.   HENT:      Head: Normocephalic and atraumatic.      Right Ear: Tympanic membrane and external ear normal.      Left Ear: Tympanic membrane and external ear normal.      Nose: Nose normal.      Mouth/Throat:      Mouth: Mucous membranes are moist.   Eyes:      General:         Right eye: No discharge.         Left eye: No discharge.      Comments: Right eye cloudy/ blind   Left eye WNL    Cardiovascular:      Rate and Rhythm: Normal rate and regular rhythm.   Pulmonary:      Effort: Pulmonary effort is normal. No respiratory distress.      Breath sounds: No wheezing.   Abdominal:      General: Bowel sounds are normal.   Lymphadenopathy:      Cervical: No cervical adenopathy.   Neurological:      Mental Status: He is alert. Mental status is at baseline.      Sensory: Sensory deficit present.      Motor: Weakness present.      Gait: Gait abnormal.      Comments: Wheelchair   Right arm weakness/ contracture       KALPANA Yañez      I have spent a total time of 60 minutes on 05/02/24 in caring for this patient including Diagnostic results, Risks and benefits of tx options, Instructions for management, Patient and family education, Importance of tx compliance, Risk factor reductions, Reviewing / ordering tests, medicine, procedures  , and Obtaining or reviewing history  .      "

## 2024-05-02 NOTE — ASSESSMENT & PLAN NOTE
Lab Results   Component Value Date    HGBA1C 5.3 05/02/2024     Well controlled, not on any medications

## 2024-05-02 NOTE — ASSESSMENT & PLAN NOTE
Today reports malodorous, discolored urine   Urine dip +leukocytes, nitrites   Will send for culture  Monthly catheter change, follow up with VNA/ urology

## 2024-05-03 ENCOUNTER — HOME CARE VISIT (OUTPATIENT)
Dept: HOME HEALTH SERVICES | Facility: HOME HEALTHCARE | Age: 64
End: 2024-05-03
Payer: MEDICARE

## 2024-05-03 VITALS
TEMPERATURE: 97.2 F | SYSTOLIC BLOOD PRESSURE: 118 MMHG | RESPIRATION RATE: 18 BRPM | OXYGEN SATURATION: 99 % | DIASTOLIC BLOOD PRESSURE: 72 MMHG | HEART RATE: 69 BPM

## 2024-05-05 LAB
BACTERIA UR CULT: ABNORMAL
BACTERIA UR CULT: ABNORMAL

## 2024-05-06 ENCOUNTER — TELEPHONE (OUTPATIENT)
Dept: FAMILY MEDICINE CLINIC | Facility: CLINIC | Age: 64
End: 2024-05-06

## 2024-05-06 ENCOUNTER — HOME CARE VISIT (OUTPATIENT)
Dept: HOME HEALTH SERVICES | Facility: HOME HEALTHCARE | Age: 64
End: 2024-05-06
Payer: MEDICARE

## 2024-05-06 PROCEDURE — G0299 HHS/HOSPICE OF RN EA 15 MIN: HCPCS

## 2024-05-06 NOTE — TELEPHONE ENCOUNTER
PCP SIGNATURE NEEDED FOR [facility]  FORM RECEIVED VIA FAX AND PLACED IN PCP FOLDER TO BE DELIVERED AT ASSIGNED TIMES.    GLUCOSE TEST STRIPS

## 2024-05-07 ENCOUNTER — HOSPITAL ENCOUNTER (OUTPATIENT)
Dept: CT IMAGING | Facility: HOSPITAL | Age: 64
Discharge: HOME | End: 2024-05-07
Payer: MEDICARE

## 2024-05-07 ENCOUNTER — HOME CARE VISIT (OUTPATIENT)
Dept: HOME HEALTH SERVICES | Facility: HOME HEALTHCARE | Age: 64
End: 2024-05-07
Payer: MEDICARE

## 2024-05-07 DIAGNOSIS — Q13.89 OTHER CONGENITAL MALFORMATIONS OF ANTERIOR SEGMENT OF EYE: ICD-10-CM

## 2024-05-07 LAB
6MAM UR QL SCN: NEGATIVE NG/ML
ACCEPTABLE CREAT UR QL: 58 MG/DL
AMPHET UR QL SCN: NEGATIVE NG/ML
BARBITURATES UR QL SCN: NEGATIVE NG/ML
BENZODIAZ UR QL SCN: NEGATIVE NG/ML
BUPRENORPHINE UR QL CFM: NEGATIVE NG/ML
CANNABINOIDS UR QL SCN: ABNORMAL NG/ML
CANNABINOIDS/CREAT UR: 45 NG/MG CREAT
CARISOPRODOL UR QL: NEGATIVE NG/ML
COCAINE+BZE UR QL SCN: NEGATIVE NG/ML
ETHYL GLUCURONIDE UR QL SCN: NEGATIVE NG/ML
FENTANYL UR QL SCN: NEGATIVE NG/ML
GABAPENTIN SERPLBLD QL SCN: NEGATIVE UG/ML
METHADONE UR QL SCN: NEGATIVE NG/ML
NITRITE UR QL STRIP: NEGATIVE UG/ML
OPIATES UR QL SCN: NEGATIVE NG/ML
OXYCODONE+OXYMORPHONE UR QL SCN: NEGATIVE NG/ML
PCP UR QL SCN: NEGATIVE NG/ML
PROPOXYPH UR QL SCN: NEGATIVE NG/ML
SPECIMEN PH ACCEPTABLE UR: 8.9 (ref 4.5–8.9)
TAPENTADOL UR QL SCN: NEGATIVE NG/ML
TRAMADOL UR QL SCN: NEGATIVE NG/ML

## 2024-05-07 PROCEDURE — 70481 CT ORBIT/EAR/FOSSA W/DYE: CPT

## 2024-05-07 PROCEDURE — 70470 CT HEAD/BRAIN W/O & W/DYE: CPT

## 2024-05-07 RX ADMIN — IOHEXOL 85 ML: 350 INJECTION, SOLUTION INTRAVENOUS at 12:49

## 2024-05-08 ENCOUNTER — HOME CARE VISIT (OUTPATIENT)
Dept: HOME HEALTH SERVICES | Facility: HOME HEALTHCARE | Age: 64
End: 2024-05-08
Payer: MEDICARE

## 2024-05-08 VITALS
DIASTOLIC BLOOD PRESSURE: 60 MMHG | OXYGEN SATURATION: 97 % | SYSTOLIC BLOOD PRESSURE: 112 MMHG | HEART RATE: 76 BPM | TEMPERATURE: 97.3 F | RESPIRATION RATE: 18 BRPM

## 2024-05-08 PROCEDURE — G0299 HHS/HOSPICE OF RN EA 15 MIN: HCPCS

## 2024-05-10 ENCOUNTER — HOME CARE VISIT (OUTPATIENT)
Dept: HOME HEALTH SERVICES | Facility: HOME HEALTHCARE | Age: 64
End: 2024-05-10
Payer: MEDICARE

## 2024-05-10 VITALS
DIASTOLIC BLOOD PRESSURE: 60 MMHG | TEMPERATURE: 97.3 F | SYSTOLIC BLOOD PRESSURE: 110 MMHG | HEART RATE: 75 BPM | OXYGEN SATURATION: 97 %

## 2024-05-10 PROCEDURE — G0299 HHS/HOSPICE OF RN EA 15 MIN: HCPCS

## 2024-05-13 ENCOUNTER — HOME CARE VISIT (OUTPATIENT)
Dept: HOME HEALTH SERVICES | Facility: HOME HEALTHCARE | Age: 64
End: 2024-05-13
Payer: MEDICARE

## 2024-05-13 VITALS — RESPIRATION RATE: 16 BRPM | TEMPERATURE: 98.3 F

## 2024-05-13 PROCEDURE — G0299 HHS/HOSPICE OF RN EA 15 MIN: HCPCS

## 2024-05-15 ENCOUNTER — HOME CARE VISIT (OUTPATIENT)
Dept: HOME HEALTH SERVICES | Facility: HOME HEALTHCARE | Age: 64
End: 2024-05-15
Payer: MEDICARE

## 2024-05-15 VITALS
HEART RATE: 93 BPM | SYSTOLIC BLOOD PRESSURE: 110 MMHG | OXYGEN SATURATION: 97 % | DIASTOLIC BLOOD PRESSURE: 60 MMHG | TEMPERATURE: 97.2 F | RESPIRATION RATE: 16 BRPM

## 2024-05-15 PROCEDURE — G0299 HHS/HOSPICE OF RN EA 15 MIN: HCPCS

## 2024-05-17 ENCOUNTER — APPOINTMENT (OUTPATIENT)
Dept: LAB | Facility: HOSPITAL | Age: 64
End: 2024-05-17
Payer: MEDICARE

## 2024-05-17 ENCOUNTER — HOME CARE VISIT (OUTPATIENT)
Dept: HOME HEALTH SERVICES | Facility: HOME HEALTHCARE | Age: 64
End: 2024-05-17
Payer: MEDICARE

## 2024-05-17 VITALS
HEART RATE: 61 BPM | TEMPERATURE: 97.3 F | SYSTOLIC BLOOD PRESSURE: 110 MMHG | DIASTOLIC BLOOD PRESSURE: 60 MMHG | OXYGEN SATURATION: 99 % | RESPIRATION RATE: 16 BRPM

## 2024-05-17 DIAGNOSIS — E11.9 TYPE 2 DIABETES MELLITUS WITHOUT COMPLICATION, WITHOUT LONG-TERM CURRENT USE OF INSULIN (HCC): ICD-10-CM

## 2024-05-17 DIAGNOSIS — I10 BENIGN ESSENTIAL HYPERTENSION: ICD-10-CM

## 2024-05-17 LAB
ALBUMIN SERPL BCP-MCNC: 3.4 G/DL (ref 3.5–5)
ALP SERPL-CCNC: 74 U/L (ref 34–104)
ALT SERPL W P-5'-P-CCNC: 10 U/L (ref 7–52)
ANION GAP SERPL CALCULATED.3IONS-SCNC: 11 MMOL/L (ref 4–13)
AST SERPL W P-5'-P-CCNC: 14 U/L (ref 13–39)
BASOPHILS # BLD AUTO: 0.07 THOUSANDS/ÂΜL (ref 0–0.1)
BASOPHILS NFR BLD AUTO: 2 % (ref 0–1)
BILIRUB SERPL-MCNC: 0.33 MG/DL (ref 0.2–1)
BUN SERPL-MCNC: 16 MG/DL (ref 5–25)
CALCIUM ALBUM COR SERPL-MCNC: 9.9 MG/DL (ref 8.3–10.1)
CALCIUM SERPL-MCNC: 9.4 MG/DL (ref 8.4–10.2)
CHLORIDE SERPL-SCNC: 103 MMOL/L (ref 96–108)
CHOLEST SERPL-MCNC: 124 MG/DL
CO2 SERPL-SCNC: 24 MMOL/L (ref 21–32)
CREAT SERPL-MCNC: 0.37 MG/DL (ref 0.6–1.3)
EOSINOPHIL # BLD AUTO: 0.2 THOUSAND/ÂΜL (ref 0–0.61)
EOSINOPHIL NFR BLD AUTO: 5 % (ref 0–6)
ERYTHROCYTE [DISTWIDTH] IN BLOOD BY AUTOMATED COUNT: 19.8 % (ref 11.6–15.1)
EST. AVERAGE GLUCOSE BLD GHB EST-MCNC: 105 MG/DL
GFR SERPL CREATININE-BSD FRML MDRD: 130 ML/MIN/1.73SQ M
GLUCOSE SERPL-MCNC: 73 MG/DL (ref 65–140)
HBA1C MFR BLD: 5.3 %
HCT VFR BLD AUTO: 34 % (ref 36.5–49.3)
HDLC SERPL-MCNC: 37 MG/DL
HGB BLD-MCNC: 9.4 G/DL (ref 12–17)
IMM GRANULOCYTES # BLD AUTO: 0 THOUSAND/UL (ref 0–0.2)
IMM GRANULOCYTES NFR BLD AUTO: 0 % (ref 0–2)
LDLC SERPL CALC-MCNC: 68 MG/DL (ref 0–100)
LYMPHOCYTES # BLD AUTO: 1.54 THOUSANDS/ÂΜL (ref 0.6–4.47)
LYMPHOCYTES NFR BLD AUTO: 34 % (ref 14–44)
MCH RBC QN AUTO: 20.3 PG (ref 26.8–34.3)
MCHC RBC AUTO-ENTMCNC: 27.6 G/DL (ref 31.4–37.4)
MCV RBC AUTO: 73 FL (ref 82–98)
MONOCYTES # BLD AUTO: 0.31 THOUSAND/ÂΜL (ref 0.17–1.22)
MONOCYTES NFR BLD AUTO: 7 % (ref 4–12)
NEUTROPHILS # BLD AUTO: 2.37 THOUSANDS/ÂΜL (ref 1.85–7.62)
NEUTS SEG NFR BLD AUTO: 52 % (ref 43–75)
NONHDLC SERPL-MCNC: 87 MG/DL
NRBC BLD AUTO-RTO: 0 /100 WBCS
PLATELET # BLD AUTO: 457 THOUSANDS/UL (ref 149–390)
PMV BLD AUTO: 10.3 FL (ref 8.9–12.7)
POTASSIUM SERPL-SCNC: 3.8 MMOL/L (ref 3.5–5.3)
PROT SERPL-MCNC: 8.3 G/DL (ref 6.4–8.4)
RBC # BLD AUTO: 4.64 MILLION/UL (ref 3.88–5.62)
SODIUM SERPL-SCNC: 138 MMOL/L (ref 135–147)
TRIGL SERPL-MCNC: 96 MG/DL
WBC # BLD AUTO: 4.49 THOUSAND/UL (ref 4.31–10.16)

## 2024-05-17 PROCEDURE — 80061 LIPID PANEL: CPT

## 2024-05-17 PROCEDURE — 85025 COMPLETE CBC W/AUTO DIFF WBC: CPT

## 2024-05-17 PROCEDURE — 36415 COLL VENOUS BLD VENIPUNCTURE: CPT

## 2024-05-17 PROCEDURE — G0299 HHS/HOSPICE OF RN EA 15 MIN: HCPCS

## 2024-05-17 PROCEDURE — 80053 COMPREHEN METABOLIC PANEL: CPT

## 2024-05-17 PROCEDURE — 83036 HEMOGLOBIN GLYCOSYLATED A1C: CPT

## 2024-05-20 ENCOUNTER — HOME CARE VISIT (OUTPATIENT)
Dept: HOME HEALTH SERVICES | Facility: HOME HEALTHCARE | Age: 64
End: 2024-05-20
Payer: MEDICARE

## 2024-05-20 PROCEDURE — G0299 HHS/HOSPICE OF RN EA 15 MIN: HCPCS

## 2024-05-21 DIAGNOSIS — R19.5 HEME POSITIVE STOOL: ICD-10-CM

## 2024-05-21 DIAGNOSIS — R97.20 ELEVATED PSA: ICD-10-CM

## 2024-05-21 DIAGNOSIS — D50.9 MICROCYTIC ANEMIA: Primary | ICD-10-CM

## 2024-05-22 ENCOUNTER — HOME CARE VISIT (OUTPATIENT)
Dept: HOME HEALTH SERVICES | Facility: HOME HEALTHCARE | Age: 64
End: 2024-05-22
Payer: MEDICARE

## 2024-05-22 VITALS
DIASTOLIC BLOOD PRESSURE: 80 MMHG | OXYGEN SATURATION: 97 % | SYSTOLIC BLOOD PRESSURE: 120 MMHG | HEART RATE: 63 BPM | TEMPERATURE: 97 F

## 2024-05-22 VITALS
OXYGEN SATURATION: 98 % | RESPIRATION RATE: 18 BRPM | DIASTOLIC BLOOD PRESSURE: 72 MMHG | TEMPERATURE: 97.5 F | HEART RATE: 78 BPM | SYSTOLIC BLOOD PRESSURE: 112 MMHG

## 2024-05-22 PROCEDURE — G0299 HHS/HOSPICE OF RN EA 15 MIN: HCPCS

## 2024-05-24 ENCOUNTER — HOME CARE VISIT (OUTPATIENT)
Dept: HOME HEALTH SERVICES | Facility: HOME HEALTHCARE | Age: 64
End: 2024-05-24
Payer: MEDICARE

## 2024-05-27 ENCOUNTER — HOME CARE VISIT (OUTPATIENT)
Dept: HOME HEALTH SERVICES | Facility: HOME HEALTHCARE | Age: 64
End: 2024-05-27
Payer: MEDICARE

## 2024-05-28 ENCOUNTER — HOME CARE VISIT (OUTPATIENT)
Dept: HOME HEALTH SERVICES | Facility: HOME HEALTHCARE | Age: 64
End: 2024-05-28
Payer: MEDICARE

## 2024-05-29 ENCOUNTER — HOME CARE VISIT (OUTPATIENT)
Dept: HOME HEALTH SERVICES | Facility: HOME HEALTHCARE | Age: 64
End: 2024-05-29
Payer: MEDICARE

## 2024-05-29 VITALS
SYSTOLIC BLOOD PRESSURE: 110 MMHG | DIASTOLIC BLOOD PRESSURE: 60 MMHG | HEART RATE: 76 BPM | TEMPERATURE: 97.3 F | OXYGEN SATURATION: 98 %

## 2024-05-29 DIAGNOSIS — E11.9 TYPE 2 DIABETES MELLITUS WITHOUT COMPLICATION, WITHOUT LONG-TERM CURRENT USE OF INSULIN (HCC): ICD-10-CM

## 2024-05-29 DIAGNOSIS — M86.68 OTHER CHRONIC OSTEOMYELITIS, OTHER SITE (HCC): ICD-10-CM

## 2024-05-29 DIAGNOSIS — M54.50 CHRONIC LOW BACK PAIN WITHOUT SCIATICA, UNSPECIFIED BACK PAIN LATERALITY: ICD-10-CM

## 2024-05-29 DIAGNOSIS — K21.9 GASTROESOPHAGEAL REFLUX DISEASE WITHOUT ESOPHAGITIS: ICD-10-CM

## 2024-05-29 DIAGNOSIS — F11.20 CONTINUOUS OPIOID DEPENDENCE (HCC): ICD-10-CM

## 2024-05-29 DIAGNOSIS — G89.29 CHRONIC LOW BACK PAIN WITHOUT SCIATICA, UNSPECIFIED BACK PAIN LATERALITY: ICD-10-CM

## 2024-05-29 PROCEDURE — G0299 HHS/HOSPICE OF RN EA 15 MIN: HCPCS

## 2024-05-29 RX ORDER — LANCETS
EACH MISCELLANEOUS DAILY
Qty: 100 EACH | Refills: 0 | Status: SHIPPED | OUTPATIENT
Start: 2024-05-29

## 2024-05-30 ENCOUNTER — OFFICE VISIT (OUTPATIENT)
Dept: PLASTIC SURGERY | Facility: CLINIC | Age: 64
End: 2024-05-30
Payer: MEDICARE

## 2024-05-30 DIAGNOSIS — Z09 FOLLOW-UP FOR PLASTIC SURGERY: ICD-10-CM

## 2024-05-30 DIAGNOSIS — L89.134 STAGE IV PRESSURE ULCER OF RIGHT LOWER BACK (HCC): Primary | ICD-10-CM

## 2024-05-30 DIAGNOSIS — G82.20 PARAPLEGIC SPINAL PARALYSIS (HCC): ICD-10-CM

## 2024-05-30 PROCEDURE — 99212 OFFICE O/P EST SF 10 MIN: CPT

## 2024-05-30 NOTE — PROGRESS NOTES
[facility]  [address], [address], [place] [address]  [phone]    Patient Identification: Rikki Arguello is a 64 y.o. male     History of Present Illness: The patient is a 64 y.o.  year-old male  who presents to the office for post-op visit. Patient is 126 days s/p Resection Rib R Chest Wall Resection/rib Resection/reconstruction - Right and latissimus flap  on 1/25/2024 by Dr. Bell and Dr. Maradiaga.     Patient continues to use wheelchair to ambulate. Last visit, pt stated velingo will be replacing wheelchair in April 2024, he has yet to received new wheelchair. He uses cushioning to offload pressure to the surgical site. He denies fevers, chills, pain or signs of infection. Applying scar cream to incisions daily. He has no complaints.  Patient has no complaints at this time.    Past Medical History:   Diagnosis Date    Anemia     Blind     r eye    Chronic cystitis     Colostomy in place ([facility])     Detrusor sphincter dyssynergia     Diabetes mellitus ([facility])     Poorly controlled type 2; Last Assessed:  3/18/14    Erectile dysfunction     Frequency of urination     GERD (gastroesophageal reflux disease)     History of diabetes mellitus     History of osteomyelitis     Hx of leg amputation ([facility])     r high upper leg    Hyperlipidemia     Hypertension     Hypospadias     Incomplete bladder emptying     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([facility]) 9/16/2019    Neurogenic bladder     Paralysis (HCC)     Paraplegia (HCC)     Spinal cord cysts     Ulcer of sacral region ([facility])     Urge incontinence         Review of Systems  Constitutional: Denies fevers, chills or pain.  Skin: Denies any warmth, erythema, edema or mucopurulent drainage.     Physical Exam    Right trunk/Back: Incision site is healing well. Scar well healed. No breakdown or wounds. No signs of infection or dehiscence. Well perfused flaps. Drain sites healing well. See media.    Assessment and Plan:  The  patient is an 64 y.o.  year-old male who presents to the office for post-op visit. Patient is 126 days s/p Resection Rib R Chest Wall Resection/rib Resection/reconstruction - Right and latissimus flap  on 1/25/2024 by Dr. Bell and Dr. Maradiaga    -At today's visit incisions examined. Healing well, no breakdown or wounds. Discussed the importance of avoiding pressure to this area, as it can lead to development of pressure ulcer. Pt understands.  -Discussed wheelchair accommodations. Offered documentation if necessary for wheelchair controls to be on the left side rather than right. Pt will reach out if necessary.   -Discussed daily scar massage and use of silicone scar gel/tape to promote scar softening and flattening. The patient was educated on scar care and that it can take up to 1 year for full scar maturation.   -Continued encouragement for wheelchair accommodations  -Patient would like to follow up as needed at this time, may call with questions or concerns.  -The patient is to call the office with any questions or concerns. All of the patient's questions were answered at this time and they agree with the plan of care.      Rhona Jiménez PA-C  [facility]

## 2024-05-31 ENCOUNTER — HOME CARE VISIT (OUTPATIENT)
Dept: HOME HEALTH SERVICES | Facility: HOME HEALTHCARE | Age: 64
End: 2024-05-31
Payer: MEDICARE

## 2024-05-31 ENCOUNTER — OFFICE VISIT (OUTPATIENT)
Dept: WOUND CARE | Facility: CLINIC | Age: 64
End: 2024-05-31
Payer: MEDICARE

## 2024-05-31 VITALS
SYSTOLIC BLOOD PRESSURE: 98 MMHG | TEMPERATURE: 95.7 F | DIASTOLIC BLOOD PRESSURE: 70 MMHG | RESPIRATION RATE: 14 BRPM | HEART RATE: 64 BPM

## 2024-05-31 DIAGNOSIS — G82.20 PARAPLEGIC SPINAL PARALYSIS (HCC): Primary | ICD-10-CM

## 2024-05-31 DIAGNOSIS — L89.154 STAGE IV PRESSURE ULCER OF SACRAL REGION (HCC): ICD-10-CM

## 2024-05-31 DIAGNOSIS — L89.223 STAGE III PRESSURE ULCER OF LEFT HIP (HCC): ICD-10-CM

## 2024-05-31 DIAGNOSIS — E11.9 TYPE 2 DIABETES MELLITUS WITHOUT COMPLICATION, WITHOUT LONG-TERM CURRENT USE OF INSULIN (HCC): ICD-10-CM

## 2024-05-31 PROCEDURE — 11045 DBRDMT SUBQ TISS EACH ADDL: CPT | Performed by: SURGERY

## 2024-05-31 PROCEDURE — 99214 OFFICE O/P EST MOD 30 MIN: CPT | Performed by: SURGERY

## 2024-05-31 PROCEDURE — 11042 DBRDMT SUBQ TIS 1ST 20SQCM/<: CPT | Performed by: SURGERY

## 2024-05-31 RX ORDER — OMEPRAZOLE 20 MG/1
20 CAPSULE, DELAYED RELEASE ORAL
Qty: 90 CAPSULE | Refills: 0 | Status: SHIPPED | OUTPATIENT
Start: 2024-05-31 | End: 2025-05-26

## 2024-05-31 RX ORDER — ASPIRIN 81 MG/1
81 TABLET ORAL DAILY
Qty: 90 TABLET | Refills: 0 | Status: SHIPPED | OUTPATIENT
Start: 2024-05-31

## 2024-05-31 RX ORDER — OXYCODONE HYDROCHLORIDE 15 MG/1
15 TABLET ORAL EVERY 6 HOURS PRN
Qty: 120 TABLET | Refills: 0 | Status: SHIPPED | OUTPATIENT
Start: 2024-05-31

## 2024-05-31 RX ORDER — IBUPROFEN 800 MG/1
800 TABLET ORAL EVERY 8 HOURS PRN
Qty: 60 TABLET | Refills: 0 | Status: SHIPPED | OUTPATIENT
Start: 2024-05-31

## 2024-05-31 RX ORDER — BLOOD SUGAR DIAGNOSTIC
1 STRIP MISCELLANEOUS DAILY
Qty: 100 EACH | Refills: 0 | Status: SHIPPED | OUTPATIENT
Start: 2024-05-31

## 2024-05-31 RX ORDER — ACETAMINOPHEN 325 MG/1
650 TABLET ORAL EVERY 6 HOURS PRN
Qty: 40 TABLET | Refills: 0 | Status: SHIPPED | OUTPATIENT
Start: 2024-05-31

## 2024-05-31 RX ORDER — LIDOCAINE HYDROCHLORIDE 40 MG/ML
5 SOLUTION TOPICAL ONCE
Status: COMPLETED | OUTPATIENT
Start: 2024-05-31 | End: 2024-05-31

## 2024-05-31 RX ORDER — NALOXONE HYDROCHLORIDE 4 MG/.1ML
SPRAY NASAL
Qty: 1 EACH | Refills: 0 | Status: SHIPPED | OUTPATIENT
Start: 2024-05-31 | End: 2025-05-29

## 2024-05-31 RX ADMIN — LIDOCAINE HYDROCHLORIDE 5 ML: 40 SOLUTION TOPICAL at 11:44

## 2024-05-31 NOTE — PATIENT INSTRUCTIONS
Orders Placed This Encounter   Procedures    Wound cleansing and dressings     Shower, No. Do not get dressings wet.                Sacral wound:  Cleanse wounds with normal saline. Pat dry   Skin prep to gaby-wound  Apply Calcium Alginate to all wound surfaces and under skin bridge followed by fluffed 4 x 4's to keep alginate in place.   Cover with ABD's.   Change dressings daily and as needed for excessive drainage       Left hip wound  Cleanse with normal saline.   Cover with silicon bordered foam.   Change three times a week.      Keep pressure off of all wounds as much as possible, limit sitting in chair as much as possible       Continue VNA three x per week (wife will do in between) for dressing changes, except on the day the patient goes to the wound center.      OFF-LOADING   Avoid pressure at wound site. - all wounds, including right back   Wheelchair Cushion. - ROHO cushion   Do Not Sit for Long Periods of Time. - 1 hr max for meals only, otherwise in bed and turn side to side at least   every 3 hours or more frequently   Reposition at least every 1-2 hours while awake.       Follow up in 4 weeks Dr. Yan     Standing Status:   Future     Standing Expiration Date:   6/7/2024

## 2024-05-31 NOTE — ASSESSMENT & PLAN NOTE
The sacral wound still connects towards the perianal area.  There was some slough that was debrided with a curette.  Continue the same care.  Follow-up in 1 month

## 2024-05-31 NOTE — PROGRESS NOTES
"Patient ID: Rikki Argulelo is a 64 y.o. male Date of Birth 1960     Chief Complaint  Chief Complaint   Patient presents with    Follow Up Wound Care Visit     Left hip and sacral wounds       Allergies  Patient has no known allergies.    Assessment:    Stage IV pressure ulcer of sacral region (HCC)  The sacral wound still connects towards the perianal area.  There was some slough that was debrided with a curette.  Continue the same care.  Follow-up in 1 month    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)  Only a small 1 to 2 mm opening remaining.  Continue to cover           Diagnoses and all orders for this visit:    Paraplegic spinal paralysis ([facility])  -     Wound cleansing and dressings; Future    Stage IV pressure ulcer of sacral region (HCC)  -     Wound cleansing and dressings; Future  -     lidocaine (XYLOCAINE) 4 % topical solution 5 mL  -     Wound Procedure Treatment Pressure Injury Sacrum    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)  -     Wound cleansing and dressings; Future  -     Wound Procedure Treatment Pressure Injury Left Hip    Type 2 diabetes mellitus without complication, without long-term current use of insulin ([facility])    Other orders  -     Debridement                Debridement   Wound 04/19/24 Pressure Injury Sacrum    Universal Protocol:  Consent: Verbal consent obtained.  Consent given by: patient  Time out: Immediately prior to procedure a \"time out\" was called to verify the correct patient, procedure, equipment, support staff and site/side marked as required.    Debridement Details  Performed by: physician  Debridement type: surgical  Level of debridement: subcutaneous tissue  Pain control: lidocaine 4%      Post-debridement measurements  Length (cm): 4  Width (cm): 9  Depth (cm): 2.3  Percent debrided: 60%  Surface Area (cm^2): 36  Area Debrided (cm^2): 21.6  Volume (cm^3): 82.8    Tissue and other material debrided: subcutaneous tissue  Devitalized tissue debrided: biofilm and " diamanteugh  Instrument(s) utilized: curette  Procedural pain (0-10): 1  Post-procedural pain: 1   Response to treatment: procedure was tolerated well        Plan:     Wound 01/18/24 Pressure Injury Hip Left (Active)   Wound Image Images linked 05/31/24 1111   Wound Description Pink;Epithelialization 05/31/24 1109   Pressure Injury Stage 3 05/31/24 1109   Shelby-wound Assessment Scar Tissue;Intact;[place] 05/31/24 1109   Wound Length (cm) 0.3 cm 05/31/24 1109   Wound Width (cm) 0.2 cm 05/31/24 1109   Wound Depth (cm) 0.1 cm 05/31/24 1109   Wound Surface Area (cm^2) 0.06 cm^2 05/31/24 1109   Wound Volume (cm^3) 0.006 cm^3 05/31/24 1109   Calculated Wound Volume (cm^3) 0.01 cm^3 05/31/24 1109   Change in Wound Size % 98.11 05/31/24 1109   Drainage Amount Scant 05/31/24 1109   Drainage Description Serous 05/31/24 1109   Non-staged Wound Description Full thickness 05/31/24 1109   Treatments Irrigation with NSS 05/31/24 1109   Wound packed? No 05/31/24 1109       Wound 04/19/24 Pressure Injury Sacrum (Active)   Wound Image Images linked 05/31/24 1135   Wound Description Pink;Rolled edges;Yellow 05/31/24 1113   Shelby-wound Assessment Intact;Scar Tissue;Pink 05/31/24 1113   Wound Length (cm) 4 cm 05/31/24 1113   Wound Width (cm) 9 cm 05/31/24 1113   Wound Depth (cm) 2.3 cm 05/31/24 1113   Wound Surface Area (cm^2) 36 cm^2 05/31/24 1113   Wound Volume (cm^3) 82.8 cm^3 05/31/24 1113   Calculated Wound Volume (cm^3) 82.8 cm^3 05/31/24 1113   Change in Wound Size % -24.51 05/31/24 1113   Undermining 1 2.8 05/31/24 1113   Undermining 2 3.2 05/31/24 1113   Undermining 1 is depth extending from 7-9 05/31/24 1113   Undermining 2 is depth extending from 1-2 05/31/24 1113   Drainage Amount Copious 05/31/24 1113   Drainage Description Yellow;Tan 05/31/24 1113   Non-staged Wound Description Full thickness 05/31/24 1113   Treatments Irrigation with NSS 05/31/24 1113       Wound 01/18/24 Pressure Injury Hip Left (Active)   Date First Assessed/Time  First Assessed: 01/18/24 1137   Primary Wound Type: Pressure Injury  Location: Hip  Wound Location Orientation: Left       Wound 04/19/24 Pressure Injury Sacrum (Active)   Date First Assessed: 04/19/24   Primary Wound Type: Pressure Injury  Location: Sacrum  Wound Outcome: Palliative       [REMOVED] Wound 08/26/19 Buttocks Left;Distal;Lateral (Removed)   Resolved Date/Resolved Time: 08/26/20 1056  Date First Assessed/Time First Assessed: 08/26/19 1130   Pre-Existing Wound: Yes  Location: Buttocks  Wound Location Orientation: Left;Distal;Lateral  Wound Description (Comments): Deep ischial wound       [REMOVED] Wound 09/16/19 Buttocks Right;Distal (Removed)   Resolved Date/Resolved Time: 08/26/20 1055  Date First Assessed/Time First Assessed: 09/16/19 1657   Pre-Existing Wound: Yes  Location: Buttocks  Wound Location Orientation: Right;Distal       [REMOVED] Wound 10/28/19 Knee Left (Removed)   Resolved Date/Resolved Time: 08/26/20 1055  Date First Assessed/Time First Assessed: 10/28/19 0657   Pre-Existing Wound: Yes  Location: Knee  Wound Location Orientation: Left  Wound Description (Comments): round black  Dressing Status: Open to air       [REMOVED] Wound 10/28/19 Buttocks Left;Mid (Removed)   Resolved Date/Resolved Time: 08/26/20 1056  Date First Assessed/Time First Assessed: 10/28/19 1108   Pre-Existing Wound: Yes  Location: Buttocks  Wound Location Orientation: Left;Mid  Wound Description (Comments): Stage 2 vs traumatic injury       [REMOVED] Wound 11/01/19 Other (Comment) N/A (Removed)   Resolved Date/Resolved Time: 08/26/20 1055  Date First Assessed/Time First Assessed: 11/01/19 1640   Location: Other (Comment)  Wound Location Orientation: N/A  Wound Description (Comments): scrotum dressed with exofin       [REMOVED] Wound 08/26/20 Pressure Injury Buttocks Left (Removed)   Resolved Date: 04/19/24  Date First Assessed/Time First Assessed: 08/26/20 1056   Primary Wound Type: Pressure Injury  Location: Buttocks   Wound Location Orientation: Left  Wound Outcome: (c) Converged       [REMOVED] Wound 08/26/20 Pressure Injury Hip Left (Removed)   Resolved Date: 10/04/23  Date First Assessed/Time First Assessed: 08/26/20 1057   Primary Wound Type: Pressure Injury  Location: Hip  Wound Location Orientation: Left  Wound Outcome: Palliative       [REMOVED] Wound 07/29/21 Pressure Injury Back Right;Lower;Lateral (Removed)   Resolved Date/Resolved Time: 01/23/24 1934  Date First Assessed: 07/29/21   Primary Wound Type: Pressure Injury  Location: Back  Wound Location Orientation: Right;Lower;Lateral  Wound Outcome: Healed       [REMOVED] Wound 11/05/21 Pressure Injury Sacrum (Removed)   Resolved Date: 04/19/24  Date First Assessed/Time First Assessed: 11/05/21 1059   Present on Original Admission: Yes  Primary Wound Type: Pressure Injury  Location: Sacrum  Wound Outcome: (c) Palliative       [REMOVED] Wound 05/27/22 Skin Tear Buttocks Left;Proximal (Removed)   Resolved Date/Resolved Time: 08/05/22 0851  Date First Assessed/Time First Assessed: 05/27/22 0946   Primary Wound Type: Skin Tear  Location: Buttocks  Wound Location Orientation: Left;Proximal  Wound Outcome: Healed       [REMOVED] Wound 03/17/23 Pressure Injury Hip Lateral;Left;Proximal (Removed)   Resolved Date: 08/21/23  Date First Assessed/Time First Assessed: 03/17/23 1039   Primary Wound Type: Pressure Injury  Location: Hip  Wound Location Orientation: Lateral;Left;Proximal  Wound Outcome: Healed       [REMOVED] Wound 06/19/23 Pressure Injury Hip Left;Medial (Removed)   Resolved Date: 09/18/23  Date First Assessed: 06/19/23   Present on Original Admission: No  Primary Wound Type: Pressure Injury  Location: Hip  Wound Location Orientation: Left;Medial  Wound Description (Comments): granulation  yellow serous drainage ...       [REMOVED] Wound 10/13/23 Pressure Injury Finger (Comment which one) Right (Removed)   Resolved Date: 11/20/23  Date First Assessed/Time First  Assessed: 10/13/23 1035   Primary Wound Type: Pressure Injury  Location: Finger (Comment which one)  Wound Location Orientation: Right  Wound Outcome: Healed       [REMOVED] Wound 01/17/24 Pressure Injury Flank Right;Upper (Removed)   Resolved Date: 02/02/24  Date First Assessed/Time First Assessed: 01/17/24 1926   Present on Original Admission: Yes  Primary Wound Type: Pressure Injury  Location: Flank  Wound Location Orientation: Right;Upper       [REMOVED] Wound 01/17/24 Knee Anterior;Left (Removed)   Resolved Date: 02/02/24  Date First Assessed/Time First Assessed: 01/17/24 1930   Location: Knee  Wound Location Orientation: Anterior;Left       [REMOVED] Wound 01/25/24 Chest Right (Removed)   Resolved Date: 03/15/24  Date First Assessed/Time First Assessed: 01/25/24 1205   Location: Chest  Wound Location Orientation: Right  Wound Description (Comments): addy [id number] negative pressure wound therapy system  [employer]'s 1st Dressing: DRESSING ...       Subjective:      .    Mr. Arguello is a 61-year-old gentleman with paraplegia and history of multiple pressure wounds with 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.  He had been rescheduled for a debridement and flap closure by plastics in August but developed a new wound on his right mid to lower back chest wall.  This wound probes deep and has been closed on the outside but the tract has not been improving.  He had a CT scan that shows a deep tracking to the muscle but no fistulization to the internal thoracic cavity.    02/04/2022 he returns now for re-evaluation.  He still has had visiting nurses but has not been seen in the Wound Center since November.  He denies any change or complaint    03/11/2022 no changes since been seen last.  No new complaints.    04/22/2022 no issues.  No changes.    05/27/2022.  Doing well.  Denies fevers or chills.  No issues spoke about plastics a re-evaluation but he wants to wait for COVID to wane more and maybe next  fall    07/22/2022.  He has not been seen here since May mostly due to transportation issues.  He has significant more pain and drainage on his right back chest wall wound.  Otherwise no changes    08/05/2022 denies fevers or chills.  Still in pain on the right side.    3/17/2023 he returns for evaluation.  He was seen in the wound center by another provider month or 2 ago.    6/9/2023.  Here for long-term follow-up.  No significant changes.    7/14/2023.  No changes.  Doing well.    9/22/2023 he had an outpatient CT scan.  The right chest wall wound has increasing drainage.  The dressings from the sacral area have some greenish color    10/13/2023.  He did receive a VAC.  He still has greenish drainage on the dressings    10/27/2023 no significant changes.  He states he did get his hospital mattress.  Tolerating the VAC well.    12/8/2023.  He has had significant creasing drainage from the VAC dressing from the right back chest wall wound.  Difficulty maintaining enough canisters for the VAC machine.    12/22/2023 he denies any change.  Denies any fevers or chills.  Denies any issues.    4/19/2024 he returns now for resumption of care.  Since being seen last he did get admitted to [facility] and underwent extensive debridement of the chest wall wound rib resection with latissimus dorsi flap closure.  That wound did very well and the wound is healed.  He is here for evaluation of his chronic sacral and hip wounds.    5/31/2024.  Overall is doing well.  He was seen by plastics and his right side is healing well.  He is complain of more pain today from the sacrum.  Denies fevers or chills.  Visit nurses and his wife are doing dressing changes            The following portions of the patient's history were reviewed and updated as appropriate: allergies, current medications, past family history, past medical history, past social history, past surgical history and problem list.    Review of Systems    Constitutional:  Negative for chills and fever.   HENT:  Negative for ear pain and sore throat.    Eyes:  Negative for pain and visual disturbance.   Respiratory:  Negative for cough and shortness of breath.    Cardiovascular:  Negative for chest pain.   Gastrointestinal:  Negative for abdominal pain and vomiting.   Skin:  Positive for wound. Negative for color change.   Neurological:  Negative for seizures and syncope.   Psychiatric/Behavioral:  Negative for agitation and behavioral problems.    All other systems reviewed and are negative.        Objective:       Wound 01/18/24 Pressure Injury Hip Left (Active)   Wound Image Images linked 05/31/24 1111   Wound Description Pink;Epithelialization 05/31/24 1109   Pressure Injury Stage 3 05/31/24 1109   Shelby-wound Assessment Scar Tissue;Intact;[place] 05/31/24 1109   Wound Length (cm) 0.3 cm 05/31/24 1109   Wound Width (cm) 0.2 cm 05/31/24 1109   Wound Depth (cm) 0.1 cm 05/31/24 1109   Wound Surface Area (cm^2) 0.06 cm^2 05/31/24 1109   Wound Volume (cm^3) 0.006 cm^3 05/31/24 1109   Calculated Wound Volume (cm^3) 0.01 cm^3 05/31/24 1109   Change in Wound Size % 98.11 05/31/24 1109   Drainage Amount Scant 05/31/24 1109   Drainage Description Serous 05/31/24 1109   Non-staged Wound Description Full thickness 05/31/24 1109   Treatments Irrigation with NSS 05/31/24 1109   Wound packed? No 05/31/24 1109       Wound 04/19/24 Pressure Injury Sacrum (Active)   Wound Image Images linked 05/31/24 1135   Wound Description Pink;Rolled edges;Yellow 05/31/24 1113   Shelby-wound Assessment Intact;Scar Tissue;Pink 05/31/24 1113   Wound Length (cm) 4 cm 05/31/24 1113   Wound Width (cm) 9 cm 05/31/24 1113   Wound Depth (cm) 2.3 cm 05/31/24 1113   Wound Surface Area (cm^2) 36 cm^2 05/31/24 1113   Wound Volume (cm^3) 82.8 cm^3 05/31/24 1113   Calculated Wound Volume (cm^3) 82.8 cm^3 05/31/24 1113   Change in Wound Size % -24.51 05/31/24 1113   Undermining 1 2.8 05/31/24 1113   Undermining 2  3.2 05/31/24 1113   Undermining 1 is depth extending from 7-9 05/31/24 1113   Undermining 2 is depth extending from 1-2 05/31/24 1113   Drainage Amount Copious 05/31/24 1113   Drainage Description Yellow;Tan 05/31/24 1113   Non-staged Wound Description Full thickness 05/31/24 1113   Treatments Irrigation with NSS 05/31/24 1113       BP 98/70   Pulse 64   Temp (!) 95.7 °F (35.4 °C)   Resp 14     Physical Exam  Vitals and nursing note reviewed. Exam conducted with a chaperone present.   Constitutional:       Appearance: Normal appearance.   Cardiovascular:      Rate and Rhythm: Normal rate and regular rhythm.   Pulmonary:      Effort: Pulmonary effort is normal.      Breath sounds: Normal breath sounds.   Abdominal:      General: There is no distension.      Palpations: Abdomen is soft. There is no mass.      Tenderness: There is no abdominal tenderness.      Comments: Ostomy   Neurological:      Mental Status: He is alert.   Psychiatric:         Mood and Affect: Mood normal.         Behavior: Behavior normal.           Wound Instructions:  Orders Placed This Encounter   Procedures    Wound cleansing and dressings     Shower, No. Do not get dressings wet.                Sacral wound:  Cleanse wounds with normal saline. Pat dry   Skin prep to gaby-wound  Apply Calcium Alginate to all wound surfaces and under skin bridge followed by fluffed 4 x 4's to keep alginate in place.   Cover with ABD's.   Change dressings daily and as needed for excessive drainage       Left hip wound  Cleanse with normal saline.   Cover with silicon bordered foam.   Change three times a week.      Keep pressure off of all wounds as much as possible, limit sitting in chair as much as possible       Continue VNA three x per week (wife will do in between) for dressing changes, except on the day the patient goes to the wound center.      OFF-LOADING   Avoid pressure at wound site. - all wounds, including right back   Wheelchair Cushion. - ANIRUDH  cushion   Do Not Sit for Long Periods of Time. - 1 hr max for meals only, otherwise in bed and turn side to side at least   every 3 hours or more frequently   Reposition at least every 1-2 hours while awake.       Follow up in 4 weeks Dr. Yan     Standing Status:   Future     Standing Expiration Date:   6/7/2024    Wound Procedure Treatment Pressure Injury Left Hip     This order was created via procedure documentation    Wound Procedure Treatment Pressure Injury Sacrum     This order was created via procedure documentation    Debridement     This order was created via procedure documentation        Diagnosis ICD-10-CM Associated Orders   1. Paraplegic spinal paralysis ([facility])  G82.20 Wound cleansing and dressings      2. Stage IV pressure ulcer of sacral region ([facility])  L89.154 Wound cleansing and dressings     lidocaine (XYLOCAINE) 4 % topical solution 5 mL     Wound Procedure Treatment Pressure Injury Sacrum      3. 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 ([facility])  L89.223 Wound cleansing and dressings     Wound Procedure Treatment Pressure Injury Left Hip      4. Type 2 diabetes mellitus without complication, without long-term current use of insulin ([facility])  E11.9

## 2024-05-31 NOTE — PROGRESS NOTES
Wound Procedure Treatment Pressure Injury Left Hip    Performed by: Syd Del Rosario RN  Authorized by: Tex Yan MD    Associated wounds:   Wound 01/18/24 Pressure Injury Hip Left  Wound cleansed with:  NSS  Applied secondary dressing:  Foam  Comments:  Mepilex

## 2024-05-31 NOTE — PROGRESS NOTES
Wound Procedure Treatment Pressure Injury Sacrum    Performed by: Syd Del Rosario RN  Authorized by: eTx Yan MD    Associated wounds:   Wound 04/19/24 Pressure Injury Sacrum  Wound cleansed with:  NSS  Applied primary dressing:  Calcium alginate  Applied secondary dressing:  Gauze and ABD  Dressing secured with:  Tape

## 2024-06-03 ENCOUNTER — HOME CARE VISIT (OUTPATIENT)
Dept: HOME HEALTH SERVICES | Facility: HOME HEALTHCARE | Age: 64
End: 2024-06-03
Payer: MEDICARE

## 2024-06-03 VITALS
TEMPERATURE: 97.1 F | OXYGEN SATURATION: 96 % | DIASTOLIC BLOOD PRESSURE: 60 MMHG | SYSTOLIC BLOOD PRESSURE: 110 MMHG | HEART RATE: 86 BPM

## 2024-06-03 PROCEDURE — 400014 VN F/U

## 2024-06-03 PROCEDURE — G0299 HHS/HOSPICE OF RN EA 15 MIN: HCPCS

## 2024-06-05 ENCOUNTER — HOME CARE VISIT (OUTPATIENT)
Dept: HOME HEALTH SERVICES | Facility: HOME HEALTHCARE | Age: 64
End: 2024-06-05
Payer: MEDICARE

## 2024-06-05 VITALS
SYSTOLIC BLOOD PRESSURE: 120 MMHG | TEMPERATURE: 97.1 F | RESPIRATION RATE: 16 BRPM | HEART RATE: 68 BPM | DIASTOLIC BLOOD PRESSURE: 70 MMHG | OXYGEN SATURATION: 97 %

## 2024-06-05 PROCEDURE — G0299 HHS/HOSPICE OF RN EA 15 MIN: HCPCS

## 2024-06-06 DIAGNOSIS — M25.511 ACUTE PAIN OF RIGHT SHOULDER: Primary | ICD-10-CM

## 2024-06-07 ENCOUNTER — HOME CARE VISIT (OUTPATIENT)
Dept: HOME HEALTH SERVICES | Facility: HOME HEALTHCARE | Age: 64
End: 2024-06-07
Payer: MEDICARE

## 2024-06-07 VITALS
DIASTOLIC BLOOD PRESSURE: 68 MMHG | SYSTOLIC BLOOD PRESSURE: 112 MMHG | HEART RATE: 59 BPM | OXYGEN SATURATION: 98 % | RESPIRATION RATE: 18 BRPM | TEMPERATURE: 97.7 F

## 2024-06-07 PROCEDURE — G0299 HHS/HOSPICE OF RN EA 15 MIN: HCPCS

## 2024-06-10 ENCOUNTER — HOME CARE VISIT (OUTPATIENT)
Dept: HOME HEALTH SERVICES | Facility: HOME HEALTHCARE | Age: 64
End: 2024-06-10
Payer: MEDICARE

## 2024-06-10 VITALS
HEART RATE: 66 BPM | DIASTOLIC BLOOD PRESSURE: 72 MMHG | OXYGEN SATURATION: 100 % | TEMPERATURE: 97.1 F | SYSTOLIC BLOOD PRESSURE: 122 MMHG

## 2024-06-10 PROCEDURE — G0299 HHS/HOSPICE OF RN EA 15 MIN: HCPCS

## 2024-06-12 ENCOUNTER — HOME CARE VISIT (OUTPATIENT)
Dept: HOME HEALTH SERVICES | Facility: HOME HEALTHCARE | Age: 64
End: 2024-06-12
Payer: MEDICARE

## 2024-06-12 VITALS
DIASTOLIC BLOOD PRESSURE: 72 MMHG | OXYGEN SATURATION: 99 % | TEMPERATURE: 97.5 F | SYSTOLIC BLOOD PRESSURE: 132 MMHG | HEART RATE: 59 BPM

## 2024-06-12 PROCEDURE — G0299 HHS/HOSPICE OF RN EA 15 MIN: HCPCS

## 2024-06-14 ENCOUNTER — HOME CARE VISIT (OUTPATIENT)
Dept: HOME HEALTH SERVICES | Facility: HOME HEALTHCARE | Age: 64
End: 2024-06-14
Payer: MEDICARE

## 2024-06-14 PROCEDURE — G0299 HHS/HOSPICE OF RN EA 15 MIN: HCPCS

## 2024-06-17 ENCOUNTER — HOME CARE VISIT (OUTPATIENT)
Dept: HOME HEALTH SERVICES | Facility: HOME HEALTHCARE | Age: 64
End: 2024-06-17
Payer: MEDICARE

## 2024-06-19 ENCOUNTER — HOME CARE VISIT (OUTPATIENT)
Dept: HOME HEALTH SERVICES | Facility: HOME HEALTHCARE | Age: 64
End: 2024-06-19
Payer: MEDICARE

## 2024-06-19 PROCEDURE — G0299 HHS/HOSPICE OF RN EA 15 MIN: HCPCS

## 2024-06-21 ENCOUNTER — HOME CARE VISIT (OUTPATIENT)
Dept: HOME HEALTH SERVICES | Facility: HOME HEALTHCARE | Age: 64
End: 2024-06-21
Payer: MEDICARE

## 2024-06-21 PROCEDURE — G0299 HHS/HOSPICE OF RN EA 15 MIN: HCPCS

## 2024-06-24 ENCOUNTER — HOME CARE VISIT (OUTPATIENT)
Dept: HOME HEALTH SERVICES | Facility: HOME HEALTHCARE | Age: 64
End: 2024-06-24
Payer: MEDICARE

## 2024-06-24 PROCEDURE — G0299 HHS/HOSPICE OF RN EA 15 MIN: HCPCS

## 2024-06-25 VITALS
OXYGEN SATURATION: 99 % | HEART RATE: 88 BPM | SYSTOLIC BLOOD PRESSURE: 102 MMHG | DIASTOLIC BLOOD PRESSURE: 62 MMHG | RESPIRATION RATE: 16 BRPM | TEMPERATURE: 97.2 F

## 2024-06-25 VITALS — RESPIRATION RATE: 16 BRPM

## 2024-06-26 ENCOUNTER — HOME CARE VISIT (OUTPATIENT)
Dept: HOME HEALTH SERVICES | Facility: HOME HEALTHCARE | Age: 64
End: 2024-06-26
Payer: MEDICARE

## 2024-06-26 PROCEDURE — G0299 HHS/HOSPICE OF RN EA 15 MIN: HCPCS

## 2024-06-27 DIAGNOSIS — M54.50 CHRONIC LOW BACK PAIN WITHOUT SCIATICA, UNSPECIFIED BACK PAIN LATERALITY: ICD-10-CM

## 2024-06-27 DIAGNOSIS — G89.29 CHRONIC LOW BACK PAIN WITHOUT SCIATICA, UNSPECIFIED BACK PAIN LATERALITY: ICD-10-CM

## 2024-06-27 DIAGNOSIS — M86.68 OTHER CHRONIC OSTEOMYELITIS, OTHER SITE (HCC): ICD-10-CM

## 2024-06-27 DIAGNOSIS — F11.20 CONTINUOUS OPIOID DEPENDENCE (HCC): ICD-10-CM

## 2024-06-28 ENCOUNTER — HOME CARE VISIT (OUTPATIENT)
Dept: HOME HEALTH SERVICES | Facility: HOME HEALTHCARE | Age: 64
End: 2024-06-28
Payer: MEDICARE

## 2024-06-28 ENCOUNTER — APPOINTMENT (OUTPATIENT)
Dept: LAB | Facility: HOSPITAL | Age: 64
End: 2024-06-28
Payer: MEDICARE

## 2024-06-28 ENCOUNTER — HOSPITAL ENCOUNTER (OUTPATIENT)
Dept: RADIOLOGY | Facility: HOSPITAL | Age: 64
End: 2024-06-28
Payer: MEDICARE

## 2024-06-28 ENCOUNTER — OFFICE VISIT (OUTPATIENT)
Dept: WOUND CARE | Facility: CLINIC | Age: 64
End: 2024-06-28
Payer: MEDICARE

## 2024-06-28 VITALS — DIASTOLIC BLOOD PRESSURE: 82 MMHG | TEMPERATURE: 97.4 F | SYSTOLIC BLOOD PRESSURE: 148 MMHG

## 2024-06-28 DIAGNOSIS — L89.223 STAGE III PRESSURE ULCER OF LEFT HIP (HCC): ICD-10-CM

## 2024-06-28 DIAGNOSIS — D50.9 MICROCYTIC ANEMIA: ICD-10-CM

## 2024-06-28 DIAGNOSIS — M24.412 CHRONIC DISLOCATION OF LEFT SHOULDER: Primary | ICD-10-CM

## 2024-06-28 DIAGNOSIS — G82.20 PARAPLEGIC SPINAL PARALYSIS (HCC): Primary | ICD-10-CM

## 2024-06-28 DIAGNOSIS — L89.154 STAGE IV PRESSURE ULCER OF SACRAL REGION (HCC): ICD-10-CM

## 2024-06-28 DIAGNOSIS — L89.44 PRESSURE INJURY OF CONTIGUOUS REGION INVOLVING BACK AND LEFT BUTTOCK, STAGE 4 (HCC): ICD-10-CM

## 2024-06-28 DIAGNOSIS — R97.20 ELEVATED PSA: ICD-10-CM

## 2024-06-28 DIAGNOSIS — M25.511 ACUTE PAIN OF RIGHT SHOULDER: ICD-10-CM

## 2024-06-28 LAB
FERRITIN SERPL-MCNC: 10 NG/ML (ref 24–336)
IRON SATN MFR SERPL: 7 % (ref 15–50)
IRON SERPL-MCNC: 29 UG/DL (ref 50–212)
PSA FREE MFR SERPL: 8.18 %
PSA FREE SERPL-MCNC: 0.36 NG/ML
PSA SERPL-MCNC: 4.35 NG/ML (ref 0–4)
TIBC SERPL-MCNC: 387 UG/DL (ref 250–450)
UIBC SERPL-MCNC: 358 UG/DL (ref 155–355)

## 2024-06-28 PROCEDURE — 83540 ASSAY OF IRON: CPT

## 2024-06-28 PROCEDURE — 99214 OFFICE O/P EST MOD 30 MIN: CPT | Performed by: SURGERY

## 2024-06-28 PROCEDURE — 73030 X-RAY EXAM OF SHOULDER: CPT

## 2024-06-28 PROCEDURE — 11045 DBRDMT SUBQ TISS EACH ADDL: CPT | Performed by: SURGERY

## 2024-06-28 PROCEDURE — 82728 ASSAY OF FERRITIN: CPT

## 2024-06-28 PROCEDURE — 84153 ASSAY OF PSA TOTAL: CPT

## 2024-06-28 PROCEDURE — 11042 DBRDMT SUBQ TIS 1ST 20SQCM/<: CPT | Performed by: SURGERY

## 2024-06-28 PROCEDURE — 83550 IRON BINDING TEST: CPT

## 2024-06-28 PROCEDURE — 84154 ASSAY OF PSA FREE: CPT

## 2024-06-28 NOTE — PATIENT INSTRUCTIONS
Orders Placed This Encounter   Procedures    Wound cleansing and dressings     Shower, No. Do not get dressings wet.              Sacral wound:  Cleanse wounds with normal saline. Pat dry   Skin prep to gaby-wound  Apply Calcium Alginate to all wound surfaces and under skin bridge followed by fluffed 4 x 4's to keep alginate in place.   Cover with ABD's.   Change dressings daily and as needed for excessive drainage       Left hip wound  This is closed today,  please keep covered with silicone bordered foam dressing for protection.       Keep pressure off of all wounds as much as possible, limit sitting in chair as much as possible       Continue VNA three x per week (wife will do in between) for dressing changes, except on the day the patient goes to the wound center.      OFF-LOADING   Avoid pressure at wound site. - all wounds, including right back   Wheelchair Cushion. - ROHO cushion   Do Not Sit for Long Periods of Time. - 1 hr max for meals only, otherwise in bed and turn side to side at least   every 3 hours or more frequently   Reposition at least every 1-2 hours while awake.       Follow up in 4 weeks Dr. Yan     Standing Status:   Future     Standing Expiration Date:   7/5/2024

## 2024-06-28 NOTE — PROGRESS NOTES
"Patient ID: Rikki Arguello is a 64 y.o. male Date of Birth 1960     Chief Complaint  Chief Complaint   Patient presents with    Follow Up Wound Care Visit     Follow up visit for palliative wounds to sacrum & left hip.  Pt denies any issues or concerns since last visit.        Allergies  Patient has no known allergies.    Assessment:    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)  Tentatively healed    Stage IV pressure ulcer of sacral region (HCC)  Wound was debrided. 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  Consent given by: patient  Time out: Immediately prior to procedure a \"time out\" was called to verify the correct patient, procedure, equipment, support staff and site/side marked as required.    Debridement Details  Performed by: physician  Debridement type: surgical  Level of debridement: subcutaneous tissue  Pain control: lidocaine 4%      Post-debridement measurements  Length (cm): 8.5  Width (cm): 5.2  Depth (cm): 0.7  Percent debrided: 100%  Surface Area (cm^2): 44.2  Area Debrided (cm^2): 44.2  Volume (cm^3): 30.94    Tissue and other material debrided: subcutaneous tissue  Devitalized tissue debrided: biofilm and slough  Instrument(s) utilized: curette  Procedural pain (0-10): insensate  Post-procedural pain: insensate   Response to treatment: procedure was tolerated well        Plan:     Wound 01/18/24 Pressure Injury Hip Left (Active)   Wound Image Images " linked 06/28/24 1027   Wound Description Pink;Epithelialization;Dry 06/28/24 1022   Shelby-wound Assessment Scar Tissue;Intact;Pink 06/28/24 1022   Wound Length (cm) 0 cm 06/28/24 1022   Wound Width (cm) 0 cm 06/28/24 1022   Wound Depth (cm) 0 cm 06/28/24 1022   Wound Surface Area (cm^2) 0 cm^2 06/28/24 1022   Wound Volume (cm^3) 0 cm^3 06/28/24 1022   Calculated Wound Volume (cm^3) 0 cm^3 06/28/24 1022   Change in Wound Size % 100 06/28/24 1022   Drainage Amount None 06/28/24 1022   Drainage Description DISHA 06/28/24 1022   Non-staged Wound Description Not applicable 06/28/24 1022       Wound 04/19/24 Pressure Injury Sacrum (Active)   Wound Image Images linked 06/28/24 1106   Wound Description Pink;Rolled edges;Yellow (probes to bone) 06/28/24 1022   Pressure Injury Stage 4 06/28/24 1022   Shelby-wound Assessment Intact;Scar Tissue;Pink 06/28/24 1022   Wound Length (cm) 8.5 cm 06/28/24 1022   Wound Width (cm) 5.2 cm 06/28/24 1022   Wound Depth (cm) 0.7 cm 06/28/24 1022   Wound Surface Area (cm^2) 44.2 cm^2 06/28/24 1022   Wound Volume (cm^3) 30.94 cm^3 06/28/24 1022   Calculated Wound Volume (cm^3) 30.94 cm^3 06/28/24 1022   Change in Wound Size % 53.47 06/28/24 1022   Undermining 1 1.4 06/28/24 1022   Undermining 1 is depth extending from 7-10 o 'clock 06/28/24 1022   Drainage Amount Large 06/28/24 1022   Drainage Description Yellow;Tan 06/28/24 1022   Non-staged Wound Description Full thickness 06/28/24 1022       Wound 01/18/24 Pressure Injury Hip Left (Active)   Date First Assessed/Time First Assessed: 01/18/24 1137   Primary Wound Type: Pressure Injury  Location: Hip  Wound Location Orientation: Left       Wound 04/19/24 Pressure Injury Sacrum (Active)   Date First Assessed: 04/19/24   Primary Wound Type: Pressure Injury  Location: Sacrum  Wound Outcome: Palliative       [REMOVED] Wound 08/26/19 Buttocks Left;Distal;Lateral (Removed)   Resolved Date/Resolved Time: 08/26/20 1056  Date First Assessed/Time First  Assessed: 08/26/19 1130   Pre-Existing Wound: Yes  Location: Buttocks  Wound Location Orientation: Left;Distal;Lateral  Wound Description (Comments): Deep ischial wound       [REMOVED] Wound 09/16/19 Buttocks Right;Distal (Removed)   Resolved Date/Resolved Time: 08/26/20 1055  Date First Assessed/Time First Assessed: 09/16/19 1657   Pre-Existing Wound: Yes  Location: Buttocks  Wound Location Orientation: Right;Distal       [REMOVED] Wound 10/28/19 Knee Left (Removed)   Resolved Date/Resolved Time: 08/26/20 1055  Date First Assessed/Time First Assessed: 10/28/19 0657   Pre-Existing Wound: Yes  Location: Knee  Wound Location Orientation: Left  Wound Description (Comments): round black  Dressing Status: Open to air       [REMOVED] Wound 10/28/19 Buttocks Left;Mid (Removed)   Resolved Date/Resolved Time: 08/26/20 1056  Date First Assessed/Time First Assessed: 10/28/19 1108   Pre-Existing Wound: Yes  Location: Buttocks  Wound Location Orientation: Left;Mid  Wound Description (Comments): Stage 2 vs traumatic injury       [REMOVED] Wound 11/01/19 Other (Comment) N/A (Removed)   Resolved Date/Resolved Time: 08/26/20 1055  Date First Assessed/Time First Assessed: 11/01/19 1640   Location: Other (Comment)  Wound Location Orientation: N/A  Wound Description (Comments): scrotum dressed with exofin       [REMOVED] Wound 08/26/20 Pressure Injury Buttocks Left (Removed)   Resolved Date: 04/19/24  Date First Assessed/Time First Assessed: 08/26/20 1056   Primary Wound Type: Pressure Injury  Location: Buttocks  Wound Location Orientation: Left  Wound Outcome: (c) Converged       [REMOVED] Wound 08/26/20 Pressure Injury Hip Left (Removed)   Resolved Date: 10/04/23  Date First Assessed/Time First Assessed: 08/26/20 1057   Primary Wound Type: Pressure Injury  Location: Hip  Wound Location Orientation: Left  Wound Outcome: Palliative       [REMOVED] Wound 07/29/21 Pressure Injury Back Right;Lower;Lateral (Removed)   Resolved  Date/Resolved Time: 01/23/24 1934  Date First Assessed: 07/29/21   Primary Wound Type: Pressure Injury  Location: Back  Wound Location Orientation: Right;Lower;Lateral  Wound Outcome: Healed       [REMOVED] Wound 11/05/21 Pressure Injury Sacrum (Removed)   Resolved Date: 04/19/24  Date First Assessed/Time First Assessed: 11/05/21 1059   Present on Original Admission: Yes  Primary Wound Type: Pressure Injury  Location: Sacrum  Wound Outcome: (c) Palliative       [REMOVED] Wound 05/27/22 Skin Tear Buttocks Left;Proximal (Removed)   Resolved Date/Resolved Time: 08/05/22 0851  Date First Assessed/Time First Assessed: 05/27/22 0946   Primary Wound Type: Skin Tear  Location: Buttocks  Wound Location Orientation: Left;Proximal  Wound Outcome: Healed       [REMOVED] Wound 03/17/23 Pressure Injury Hip Lateral;Left;Proximal (Removed)   Resolved Date: 08/21/23  Date First Assessed/Time First Assessed: 03/17/23 1039   Primary Wound Type: Pressure Injury  Location: Hip  Wound Location Orientation: Lateral;Left;Proximal  Wound Outcome: Healed       [REMOVED] Wound 06/19/23 Pressure Injury Hip Left;Medial (Removed)   Resolved Date: 09/18/23  Date First Assessed: 06/19/23   Present on Original Admission: No  Primary Wound Type: Pressure Injury  Location: Hip  Wound Location Orientation: Left;Medial  Wound Description (Comments): granulation  yellow serous drainage ...       [REMOVED] Wound 10/13/23 Pressure Injury Finger (Comment which one) Right (Removed)   Resolved Date: 11/20/23  Date First Assessed/Time First Assessed: 10/13/23 1035   Primary Wound Type: Pressure Injury  Location: Finger (Comment which one)  Wound Location Orientation: Right  Wound Outcome: Healed       [REMOVED] Wound 01/17/24 Pressure Injury Flank Right;Upper (Removed)   Resolved Date: 02/02/24  Date First Assessed/Time First Assessed: 01/17/24 1926   Present on Original Admission: Yes  Primary Wound Type: Pressure Injury  Location: Flank  Wound Location  Orientation: Right;Upper       [REMOVED] Wound 01/17/24 Knee Anterior;Left (Removed)   Resolved Date: 02/02/24  Date First Assessed/Time First Assessed: 01/17/24 1930   Location: Knee  Wound Location Orientation: Anterior;Left       [REMOVED] Wound 01/25/24 Chest Right (Removed)   Resolved Date: 03/15/24  Date First Assessed/Time First Assessed: 01/25/24 1205   Location: Chest  Wound Location Orientation: Right  Wound Description (Comments): addy [id number] negative pressure wound therapy system  [employer]'s 1st Dressing: DRESSING ...       Subjective:      .    Mr. Arguello is a 61-year-old gentleman with paraplegia and history of multiple pressure wounds with 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.  He had been rescheduled for a debridement and flap closure by plastics in August but developed a new wound on his right mid to lower back chest wall.  This wound probes deep and has been closed on the outside but the tract has not been improving.  He had a CT scan that shows a deep tracking to the muscle but no fistulization to the internal thoracic cavity.    02/04/2022 he returns now for re-evaluation.  He still has had visiting nurses but has not been seen in the Wound Center since November.  He denies any change or complaint    03/11/2022 no changes since been seen last.  No new complaints.    04/22/2022 no issues.  No changes.    05/27/2022.  Doing well.  Denies fevers or chills.  No issues spoke about plastics a re-evaluation but he wants to wait for COVID to wane more and maybe next fall 07/22/2022.  He has not been seen here since May mostly due to transportation issues.  He has significant more pain and drainage on his right back chest wall wound.  Otherwise no changes    08/05/2022 denies fevers or chills.  Still in pain on the right side.    3/17/2023 he returns for evaluation.  He was seen in the wound center by another provider month or 2 ago.    6/9/2023.  Here for long-term follow-up.  No  significant changes.    7/14/2023.  No changes.  Doing well.    9/22/2023 he had an outpatient CT scan.  The right chest wall wound has increasing drainage.  The dressings from the sacral area have some greenish color    10/13/2023.  He did receive a VAC.  He still has greenish drainage on the dressings    10/27/2023 no significant changes.  He states he did get his hospital mattress.  Tolerating the VAC well.    12/8/2023.  He has had significant creasing drainage from the VAC dressing from the right back chest wall wound.  Difficulty maintaining enough canisters for the VAC machine.    12/22/2023 he denies any change.  Denies any fevers or chills.  Denies any issues.    4/19/2024 he returns now for resumption of care.  Since being seen last he did get admitted to [facility] and underwent extensive debridement of the chest wall wound rib resection with latissimus dorsi flap closure.  That wound did very well and the wound is healed.  He is here for evaluation of his chronic sacral and hip wounds.    5/31/2024.  Overall is doing well.  He was seen by plastics and his right side is healing well.  He is complain of more pain today from the sacrum.  Denies fevers or chills.  Visit nurses and his wife are doing dressing changes      6/20/2024.  Note complaints or changes since seen last.            The following portions of the patient's history were reviewed and updated as appropriate: allergies, current medications, past family history, past medical history, past social history, past surgical history and problem list.    Review of Systems   Constitutional:  Negative for chills and fever.   HENT:  Negative for ear pain and sore throat.    Eyes:  Negative for pain and visual disturbance.   Respiratory:  Negative for cough and shortness of breath.    Cardiovascular:  Negative for chest pain.   Gastrointestinal:  Negative for abdominal pain and vomiting.   Skin:  Positive for wound. Negative for color change.    Neurological:  Negative for seizures and syncope.   Psychiatric/Behavioral:  Negative for agitation and behavioral problems.    All other systems reviewed and are negative.        Objective:       Wound 01/18/24 Pressure Injury Hip Left (Active)   Wound Image Images linked 06/28/24 1027   Wound Description Pink;Epithelialization;Dry 06/28/24 1022   Shelby-wound Assessment Scar Tissue;Intact;Pink 06/28/24 1022   Wound Length (cm) 0 cm 06/28/24 1022   Wound Width (cm) 0 cm 06/28/24 1022   Wound Depth (cm) 0 cm 06/28/24 1022   Wound Surface Area (cm^2) 0 cm^2 06/28/24 1022   Wound Volume (cm^3) 0 cm^3 06/28/24 1022   Calculated Wound Volume (cm^3) 0 cm^3 06/28/24 1022   Change in Wound Size % 100 06/28/24 1022   Drainage Amount None 06/28/24 1022   Drainage Description DISHA 06/28/24 1022   Non-staged Wound Description Not applicable 06/28/24 1022       Wound 04/19/24 Pressure Injury Sacrum (Active)   Wound Image Images linked 06/28/24 1106   Wound Description Pink;Rolled edges;Yellow (probes to bone) 06/28/24 1022   Pressure Injury Stage 4 06/28/24 1022   Shelby-wound Assessment Intact;Scar Tissue;Pink 06/28/24 1022   Wound Length (cm) 8.5 cm 06/28/24 1022   Wound Width (cm) 5.2 cm 06/28/24 1022   Wound Depth (cm) 0.7 cm 06/28/24 1022   Wound Surface Area (cm^2) 44.2 cm^2 06/28/24 1022   Wound Volume (cm^3) 30.94 cm^3 06/28/24 1022   Calculated Wound Volume (cm^3) 30.94 cm^3 06/28/24 1022   Change in Wound Size % 53.47 06/28/24 1022   Undermining 1 1.4 06/28/24 1022   Undermining 1 is depth extending from 7-10 o 'clock 06/28/24 1022   Drainage Amount Large 06/28/24 1022   Drainage Description Yellow;Tan 06/28/24 1022   Non-staged Wound Description Full thickness 06/28/24 1022       /82   Temp (!) 97.4 °F (36.3 °C) (Tympanic)     Physical Exam  Vitals and nursing note reviewed. Exam conducted with a chaperone present.   Constitutional:       Appearance: Normal appearance.   Cardiovascular:      Rate and Rhythm:  Normal rate and regular rhythm.   Pulmonary:      Effort: Pulmonary effort is normal.      Breath sounds: Normal breath sounds.   Abdominal:      General: There is no distension.      Palpations: Abdomen is soft. There is no mass.      Tenderness: There is no abdominal tenderness.      Comments: Ostomy   Neurological:      Mental Status: He is alert.   Psychiatric:         Mood and Affect: Mood normal.         Behavior: Behavior normal.           Wound Instructions:  Orders Placed This Encounter   Procedures    Wound cleansing and dressings     Shower, No. Do not get dressings wet.              Sacral wound:  Cleanse wounds with normal saline. Pat dry   Skin prep to gaby-wound  Apply Calcium Alginate to all wound surfaces and under skin bridge followed by fluffed 4 x 4's to keep alginate in place.   Cover with ABD's.   Change dressings daily and as needed for excessive drainage       Left hip wound  This is closed today,  please keep covered with silicone bordered foam dressing for protection.       Keep pressure off of all wounds as much as possible, limit sitting in chair as much as possible       Continue VNA three x per week (wife will do in between) for dressing changes, except on the day the patient goes to the wound center.      OFF-LOADING   Avoid pressure at wound site. - all wounds, including right back   Wheelchair Cushion. - ROHO cushion   Do Not Sit for Long Periods of Time. - 1 hr max for meals only, otherwise in bed and turn side to side at least   every 3 hours or more frequently   Reposition at least every 1-2 hours while awake.       Follow up in 4 weeks Dr. Yan     Standing Status:   Future     Standing Expiration Date:   7/5/2024    Debridement     This order was created via procedure documentation        Diagnosis ICD-10-CM Associated Orders   1. Paraplegic spinal paralysis ([facility])  G82.20 Wound cleansing and dressings      2. Stage IV pressure ulcer of sacral region ([facility])  L89.154 Wound  cleansing and dressings      3. 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 ([facility])  L89.223 Wound cleansing and dressings      4. Pressure injury of contiguous region involving back and left buttock, stage 4 ([facility])  L89.44

## 2024-06-30 VITALS — OXYGEN SATURATION: 97 % | HEART RATE: 82 BPM | RESPIRATION RATE: 16 BRPM | TEMPERATURE: 97.7 F

## 2024-07-01 ENCOUNTER — HOME CARE VISIT (OUTPATIENT)
Dept: HOME HEALTH SERVICES | Facility: HOME HEALTHCARE | Age: 64
End: 2024-07-01
Payer: MEDICARE

## 2024-07-01 ENCOUNTER — TELEPHONE (OUTPATIENT)
Dept: FAMILY MEDICINE CLINIC | Facility: CLINIC | Age: 64
End: 2024-07-01

## 2024-07-01 PROCEDURE — G0299 HHS/HOSPICE OF RN EA 15 MIN: HCPCS

## 2024-07-01 RX ORDER — OXYCODONE HYDROCHLORIDE 15 MG/1
15 TABLET ORAL EVERY 6 HOURS PRN
Qty: 120 TABLET | Refills: 0 | Status: SHIPPED | OUTPATIENT
Start: 2024-07-01

## 2024-07-01 RX ORDER — NALOXONE HYDROCHLORIDE 4 MG/.1ML
SPRAY NASAL
Qty: 1 EACH | Refills: 0 | Status: SHIPPED | OUTPATIENT
Start: 2024-07-01 | End: 2025-06-25

## 2024-07-01 NOTE — TELEPHONE ENCOUNTER
PCP SIGNATURE NEEDED FOR [employer]  FORM RECEIVED VIA FAX AND PLACED IN PCP FOLDER TO BE DELIVERED AT ASSIGNED TIMES.    Physician Repair order Request for Wheelchair.

## 2024-07-02 ENCOUNTER — HOME CARE VISIT (OUTPATIENT)
Dept: HOME HEALTH SERVICES | Facility: HOME HEALTHCARE | Age: 64
End: 2024-07-02
Payer: MEDICARE

## 2024-07-02 PROCEDURE — G0299 HHS/HOSPICE OF RN EA 15 MIN: HCPCS

## 2024-07-03 ENCOUNTER — TELEPHONE (OUTPATIENT)
Dept: OBGYN CLINIC | Facility: MEDICAL CENTER | Age: 64
End: 2024-07-03

## 2024-07-03 ENCOUNTER — HOME CARE VISIT (OUTPATIENT)
Dept: HOME HEALTH SERVICES | Facility: HOME HEALTHCARE | Age: 64
End: 2024-07-03
Payer: MEDICARE

## 2024-07-03 VITALS
RESPIRATION RATE: 15 BRPM | OXYGEN SATURATION: 98 % | HEART RATE: 104 BPM | SYSTOLIC BLOOD PRESSURE: 102 MMHG | RESPIRATION RATE: 15 BRPM | TEMPERATURE: 97.5 F | DIASTOLIC BLOOD PRESSURE: 58 MMHG

## 2024-07-03 VITALS
DIASTOLIC BLOOD PRESSURE: 59 MMHG | RESPIRATION RATE: 16 BRPM | SYSTOLIC BLOOD PRESSURE: 108 MMHG | HEART RATE: 80 BPM | TEMPERATURE: 98.8 F

## 2024-07-03 PROCEDURE — G0299 HHS/HOSPICE OF RN EA 15 MIN: HCPCS

## 2024-07-03 NOTE — TELEPHONE ENCOUNTER
FAXED ON 07/03/24 TO [facility]  at [phone]. FAX CONFIRMATION RECEIVED.    Scanned into pt chart.

## 2024-07-03 NOTE — TELEPHONE ENCOUNTER
Patient was originally scheduled on 7/3  with Dr. Armijo but his appointment was cancelled. I called and left a voice message to reschedule him with Dr. Conteh.

## 2024-07-05 ENCOUNTER — HOME CARE VISIT (OUTPATIENT)
Dept: HOME HEALTH SERVICES | Facility: HOME HEALTHCARE | Age: 64
End: 2024-07-05
Payer: MEDICARE

## 2024-07-05 VITALS — RESPIRATION RATE: 16 BRPM

## 2024-07-05 PROCEDURE — G0299 HHS/HOSPICE OF RN EA 15 MIN: HCPCS

## 2024-07-08 ENCOUNTER — HOME CARE VISIT (OUTPATIENT)
Dept: HOME HEALTH SERVICES | Facility: HOME HEALTHCARE | Age: 64
End: 2024-07-08
Payer: MEDICARE

## 2024-07-08 VITALS
TEMPERATURE: 97.5 F | HEART RATE: 73 BPM | OXYGEN SATURATION: 95 % | SYSTOLIC BLOOD PRESSURE: 120 MMHG | RESPIRATION RATE: 16 BRPM | DIASTOLIC BLOOD PRESSURE: 74 MMHG

## 2024-07-08 PROCEDURE — G0299 HHS/HOSPICE OF RN EA 15 MIN: HCPCS

## 2024-07-09 ENCOUNTER — HOME CARE VISIT (OUTPATIENT)
Dept: HOME HEALTH SERVICES | Facility: HOME HEALTHCARE | Age: 64
End: 2024-07-09
Payer: MEDICARE

## 2024-07-09 ENCOUNTER — TELEPHONE (OUTPATIENT)
Dept: FAMILY MEDICINE CLINIC | Facility: CLINIC | Age: 64
End: 2024-07-09

## 2024-07-09 NOTE — TELEPHONE ENCOUNTER
Send this request to [facility]     [employer]    Shut off date: 07/16/24    Account number: [id number]    Name on Account: Rikkipankaj Farooqnichol    Relationship to Patient:Pt    Address on Bill: [address] [place] [id number]     Phone number: [phone]     Last visit: 5/2/24

## 2024-07-10 ENCOUNTER — HOME CARE VISIT (OUTPATIENT)
Dept: HOME HEALTH SERVICES | Facility: HOME HEALTHCARE | Age: 64
End: 2024-07-10
Payer: MEDICARE

## 2024-07-10 VITALS — RESPIRATION RATE: 15 BRPM

## 2024-07-10 PROCEDURE — G0299 HHS/HOSPICE OF RN EA 15 MIN: HCPCS

## 2024-07-12 ENCOUNTER — HOME CARE VISIT (OUTPATIENT)
Dept: HOME HEALTH SERVICES | Facility: HOME HEALTHCARE | Age: 64
End: 2024-07-12
Payer: MEDICARE

## 2024-07-12 VITALS
OXYGEN SATURATION: 96 % | SYSTOLIC BLOOD PRESSURE: 120 MMHG | RESPIRATION RATE: 18 BRPM | DIASTOLIC BLOOD PRESSURE: 80 MMHG | TEMPERATURE: 97.4 F | HEART RATE: 68 BPM

## 2024-07-12 PROCEDURE — G0299 HHS/HOSPICE OF RN EA 15 MIN: HCPCS

## 2024-07-12 NOTE — TELEPHONE ENCOUNTER
[employer] Medical Certification signed by Leigh Ann Toure and faxed on 7/11/24. Confirmation received and scanned into chart. Patient has been informed.

## 2024-07-15 ENCOUNTER — HOME CARE VISIT (OUTPATIENT)
Dept: HOME HEALTH SERVICES | Facility: HOME HEALTHCARE | Age: 64
End: 2024-07-15
Payer: MEDICARE

## 2024-07-15 VITALS
HEART RATE: 64 BPM | DIASTOLIC BLOOD PRESSURE: 82 MMHG | TEMPERATURE: 97.1 F | OXYGEN SATURATION: 99 % | SYSTOLIC BLOOD PRESSURE: 120 MMHG | RESPIRATION RATE: 16 BRPM

## 2024-07-15 PROCEDURE — G0299 HHS/HOSPICE OF RN EA 15 MIN: HCPCS

## 2024-07-17 ENCOUNTER — HOME CARE VISIT (OUTPATIENT)
Dept: HOME HEALTH SERVICES | Facility: HOME HEALTHCARE | Age: 64
End: 2024-07-17
Payer: MEDICARE

## 2024-07-17 VITALS
TEMPERATURE: 97.2 F | DIASTOLIC BLOOD PRESSURE: 72 MMHG | OXYGEN SATURATION: 97 % | RESPIRATION RATE: 16 BRPM | HEART RATE: 53 BPM | SYSTOLIC BLOOD PRESSURE: 130 MMHG

## 2024-07-17 PROCEDURE — G0299 HHS/HOSPICE OF RN EA 15 MIN: HCPCS

## 2024-07-19 ENCOUNTER — HOME CARE VISIT (OUTPATIENT)
Dept: HOME HEALTH SERVICES | Facility: HOME HEALTHCARE | Age: 64
End: 2024-07-19
Payer: MEDICARE

## 2024-07-19 ENCOUNTER — TELEPHONE (OUTPATIENT)
Dept: HOME HEALTH SERVICES | Facility: HOME HEALTHCARE | Age: 64
End: 2024-07-19

## 2024-07-22 ENCOUNTER — HOME CARE VISIT (OUTPATIENT)
Dept: HOME HEALTH SERVICES | Facility: HOME HEALTHCARE | Age: 64
End: 2024-07-22
Payer: MEDICARE

## 2024-07-22 PROCEDURE — G0299 HHS/HOSPICE OF RN EA 15 MIN: HCPCS

## 2024-07-24 ENCOUNTER — TELEPHONE (OUTPATIENT)
Dept: HOME HEALTH SERVICES | Facility: HOME HEALTHCARE | Age: 64
End: 2024-07-24

## 2024-07-24 ENCOUNTER — HOME CARE VISIT (OUTPATIENT)
Dept: HOME HEALTH SERVICES | Facility: HOME HEALTHCARE | Age: 64
End: 2024-07-24
Payer: MEDICARE

## 2024-07-24 VITALS
RESPIRATION RATE: 18 BRPM | OXYGEN SATURATION: 97 % | TEMPERATURE: 97.4 F | SYSTOLIC BLOOD PRESSURE: 120 MMHG | DIASTOLIC BLOOD PRESSURE: 70 MMHG | HEART RATE: 78 BPM

## 2024-07-26 ENCOUNTER — HOME CARE VISIT (OUTPATIENT)
Dept: HOME HEALTH SERVICES | Facility: HOME HEALTHCARE | Age: 64
End: 2024-07-26
Payer: MEDICARE

## 2024-07-26 PROCEDURE — G0299 HHS/HOSPICE OF RN EA 15 MIN: HCPCS

## 2024-07-29 ENCOUNTER — HOME CARE VISIT (OUTPATIENT)
Dept: HOME HEALTH SERVICES | Facility: HOME HEALTHCARE | Age: 64
End: 2024-07-29
Payer: MEDICARE

## 2024-07-30 ENCOUNTER — HOME CARE VISIT (OUTPATIENT)
Dept: HOME HEALTH SERVICES | Facility: HOME HEALTHCARE | Age: 64
End: 2024-07-30
Payer: MEDICARE

## 2024-07-30 VITALS
OXYGEN SATURATION: 97 % | RESPIRATION RATE: 18 BRPM | HEART RATE: 70 BPM | DIASTOLIC BLOOD PRESSURE: 60 MMHG | TEMPERATURE: 96.4 F | SYSTOLIC BLOOD PRESSURE: 120 MMHG

## 2024-07-30 DIAGNOSIS — E11.9 TYPE 2 DIABETES MELLITUS WITHOUT COMPLICATION, WITHOUT LONG-TERM CURRENT USE OF INSULIN (HCC): ICD-10-CM

## 2024-07-30 DIAGNOSIS — M54.50 CHRONIC LOW BACK PAIN WITHOUT SCIATICA, UNSPECIFIED BACK PAIN LATERALITY: ICD-10-CM

## 2024-07-30 DIAGNOSIS — M86.68 OTHER CHRONIC OSTEOMYELITIS, OTHER SITE (HCC): ICD-10-CM

## 2024-07-30 DIAGNOSIS — G89.29 CHRONIC LOW BACK PAIN WITHOUT SCIATICA, UNSPECIFIED BACK PAIN LATERALITY: ICD-10-CM

## 2024-07-30 DIAGNOSIS — F11.20 CONTINUOUS OPIOID DEPENDENCE (HCC): ICD-10-CM

## 2024-07-30 PROCEDURE — G0299 HHS/HOSPICE OF RN EA 15 MIN: HCPCS

## 2024-07-31 ENCOUNTER — HOME CARE VISIT (OUTPATIENT)
Dept: HOME HEALTH SERVICES | Facility: HOME HEALTHCARE | Age: 64
End: 2024-07-31
Payer: MEDICARE

## 2024-07-31 PROCEDURE — G0299 HHS/HOSPICE OF RN EA 15 MIN: HCPCS

## 2024-08-01 DIAGNOSIS — K21.9 GASTROESOPHAGEAL REFLUX DISEASE WITHOUT ESOPHAGITIS: ICD-10-CM

## 2024-08-02 ENCOUNTER — TELEPHONE (OUTPATIENT)
Dept: LAB | Facility: HOSPITAL | Age: 64
End: 2024-08-02

## 2024-08-02 ENCOUNTER — HOME CARE VISIT (OUTPATIENT)
Dept: HOME HEALTH SERVICES | Facility: HOME HEALTHCARE | Age: 64
End: 2024-08-02
Payer: MEDICARE

## 2024-08-02 VITALS
TEMPERATURE: 97.2 F | DIASTOLIC BLOOD PRESSURE: 62 MMHG | SYSTOLIC BLOOD PRESSURE: 120 MMHG | OXYGEN SATURATION: 95 % | RESPIRATION RATE: 18 BRPM | HEART RATE: 76 BPM

## 2024-08-02 DIAGNOSIS — M54.50 CHRONIC LOW BACK PAIN WITHOUT SCIATICA, UNSPECIFIED BACK PAIN LATERALITY: ICD-10-CM

## 2024-08-02 DIAGNOSIS — F11.20 CONTINUOUS OPIOID DEPENDENCE (HCC): ICD-10-CM

## 2024-08-02 DIAGNOSIS — M86.68 OTHER CHRONIC OSTEOMYELITIS, OTHER SITE (HCC): ICD-10-CM

## 2024-08-02 DIAGNOSIS — G89.29 CHRONIC LOW BACK PAIN WITHOUT SCIATICA, UNSPECIFIED BACK PAIN LATERALITY: ICD-10-CM

## 2024-08-02 PROCEDURE — G0299 HHS/HOSPICE OF RN EA 15 MIN: HCPCS

## 2024-08-02 PROCEDURE — 400014 VN F/U

## 2024-08-02 RX ORDER — OMEPRAZOLE 20 MG/1
20 CAPSULE, DELAYED RELEASE ORAL
Qty: 90 CAPSULE | Refills: 0 | Status: SHIPPED | OUTPATIENT
Start: 2024-08-02 | End: 2025-07-28

## 2024-08-02 RX ORDER — ACETAMINOPHEN 325 MG/1
650 TABLET ORAL EVERY 6 HOURS PRN
Qty: 40 TABLET | Refills: 0 | Status: SHIPPED | OUTPATIENT
Start: 2024-08-02

## 2024-08-02 RX ORDER — BLOOD SUGAR DIAGNOSTIC
1 STRIP MISCELLANEOUS DAILY
Qty: 100 EACH | Refills: 0 | Status: SHIPPED | OUTPATIENT
Start: 2024-08-02

## 2024-08-02 RX ORDER — OXYCODONE HYDROCHLORIDE 15 MG/1
15 TABLET ORAL EVERY 6 HOURS PRN
Qty: 120 TABLET | Refills: 0 | Status: SHIPPED | OUTPATIENT
Start: 2024-08-02

## 2024-08-02 RX ORDER — OXYCODONE HYDROCHLORIDE 15 MG/1
15 TABLET ORAL EVERY 6 HOURS PRN
Qty: 120 TABLET | Refills: 0 | OUTPATIENT
Start: 2024-08-02

## 2024-08-02 RX ORDER — IBUPROFEN 800 MG/1
800 TABLET ORAL EVERY 8 HOURS PRN
Qty: 60 TABLET | Refills: 0 | Status: SHIPPED | OUTPATIENT
Start: 2024-08-02

## 2024-08-02 RX ORDER — ASPIRIN 81 MG/1
81 TABLET ORAL DAILY
Qty: 90 TABLET | Refills: 0 | Status: SHIPPED | OUTPATIENT
Start: 2024-08-02

## 2024-08-05 ENCOUNTER — HOME CARE VISIT (OUTPATIENT)
Dept: HOME HEALTH SERVICES | Facility: HOME HEALTHCARE | Age: 64
End: 2024-08-05
Payer: MEDICARE

## 2024-08-05 VITALS
RESPIRATION RATE: 18 BRPM | SYSTOLIC BLOOD PRESSURE: 120 MMHG | DIASTOLIC BLOOD PRESSURE: 60 MMHG | TEMPERATURE: 97.1 F | OXYGEN SATURATION: 97 % | HEART RATE: 70 BPM

## 2024-08-05 PROCEDURE — G0299 HHS/HOSPICE OF RN EA 15 MIN: HCPCS

## 2024-08-07 ENCOUNTER — HOME CARE VISIT (OUTPATIENT)
Dept: HOME HEALTH SERVICES | Facility: HOME HEALTHCARE | Age: 64
End: 2024-08-07
Payer: MEDICARE

## 2024-08-07 VITALS
TEMPERATURE: 97.1 F | HEART RATE: 76 BPM | RESPIRATION RATE: 18 BRPM | DIASTOLIC BLOOD PRESSURE: 80 MMHG | OXYGEN SATURATION: 97 % | SYSTOLIC BLOOD PRESSURE: 120 MMHG

## 2024-08-07 PROCEDURE — G0299 HHS/HOSPICE OF RN EA 15 MIN: HCPCS

## 2024-08-09 ENCOUNTER — HOME CARE VISIT (OUTPATIENT)
Dept: HOME HEALTH SERVICES | Facility: HOME HEALTHCARE | Age: 64
End: 2024-08-09
Payer: MEDICARE

## 2024-08-09 ENCOUNTER — OFFICE VISIT (OUTPATIENT)
Dept: WOUND CARE | Facility: CLINIC | Age: 64
End: 2024-08-09
Payer: MEDICARE

## 2024-08-09 VITALS
OXYGEN SATURATION: 98 % | SYSTOLIC BLOOD PRESSURE: 120 MMHG | HEART RATE: 78 BPM | RESPIRATION RATE: 18 BRPM | TEMPERATURE: 97.4 F | DIASTOLIC BLOOD PRESSURE: 80 MMHG

## 2024-08-09 VITALS — RESPIRATION RATE: 16 BRPM | SYSTOLIC BLOOD PRESSURE: 110 MMHG | DIASTOLIC BLOOD PRESSURE: 82 MMHG | TEMPERATURE: 97.9 F

## 2024-08-09 DIAGNOSIS — G82.20 PARAPLEGIC SPINAL PARALYSIS (HCC): Primary | ICD-10-CM

## 2024-08-09 DIAGNOSIS — E11.9 TYPE 2 DIABETES MELLITUS WITHOUT COMPLICATION, WITHOUT LONG-TERM CURRENT USE OF INSULIN (HCC): ICD-10-CM

## 2024-08-09 DIAGNOSIS — L89.154 STAGE IV PRESSURE ULCER OF SACRAL REGION (HCC): ICD-10-CM

## 2024-08-09 PROCEDURE — 11042 DBRDMT SUBQ TIS 1ST 20SQCM/<: CPT | Performed by: SURGERY

## 2024-08-09 PROCEDURE — 11045 DBRDMT SUBQ TISS EACH ADDL: CPT | Performed by: SURGERY

## 2024-08-09 RX ORDER — LIDOCAINE HYDROCHLORIDE 40 MG/ML
5 SOLUTION TOPICAL ONCE
Status: COMPLETED | OUTPATIENT
Start: 2024-08-09 | End: 2024-08-09

## 2024-08-09 RX ADMIN — LIDOCAINE HYDROCHLORIDE 5 ML: 40 SOLUTION TOPICAL at 09:49

## 2024-08-09 NOTE — PROGRESS NOTES
"Patient ID: Rikki Arguello is a 64 y.o. male Date of Birth 1960     Chief Complaint  Chief Complaint   Patient presents with    Follow Up Wound Care Visit     Left hip and sacral wounds       Allergies  Patient has no known allergies.    Assessment:    Stage IV pressure ulcer of sacral region (HCC)  The wounds are about the same.  There are still some undermining and skin bridge in the middle.  The base is debrided of slough and nonviable tissue and biofilm.  Continue the same care.  Follow-up in a month               Diagnoses and all orders for this visit:    Paraplegic spinal paralysis (HCC)  -     Wound cleansing and dressings Pressure Injury Sacrum; Future    Stage IV pressure ulcer of sacral region (HCC)  -     Wound cleansing and dressings Pressure Injury Sacrum; Future  -     lidocaine (XYLOCAINE) 4 % topical solution 5 mL  -     Wound Procedure Treatment Pressure Injury Sacrum    Type 2 diabetes mellitus without complication, without long-term current use of insulin ([facility])    Other orders  -     Debridement                Debridement   Wound 04/19/24 Pressure Injury Sacrum    Universal Protocol:  Consent: Verbal consent obtained.  Consent given by: patient  Time out: Immediately prior to procedure a \"time out\" was called to verify the correct patient, procedure, equipment, support staff and site/side marked as required.    Debridement Details  Performed by: physician  Debridement type: surgical  Level of debridement: subcutaneous tissue  Pain control: lidocaine 4%      Post-debridement measurements  Length (cm): 5.5  Width (cm): 8  Depth (cm): 2.5  Percent debrided: 100%  Surface Area (cm^2): 44  Area Debrided (cm^2): 44  Volume (cm^3): 110    Tissue and other material debrided: subcutaneous tissue  Devitalized tissue debrided: biofilm and slough  Instrument(s) utilized: curette  Procedural pain (0-10): insensate  Post-procedural pain: insensate   Response to treatment: procedure was tolerated " well        Plan:     Wound 04/19/24 Pressure Injury Sacrum (Active)   Wound Image Images linked 08/09/24 0942   Wound Description Pink;Yellow;Other (Comment) (skin bridge with communication at 3) 08/09/24 0925   Shelby-wound Assessment Intact;Scar Tissue;Pink 08/09/24 0925   Wound Length (cm) 5.5 cm 08/09/24 0925   Wound Width (cm) 8 cm 08/09/24 0925   Wound Depth (cm) 2.5 cm 08/09/24 0925   Wound Surface Area (cm^2) 44 cm^2 08/09/24 0925   Wound Volume (cm^3) 110 cm^3 08/09/24 0925   Calculated Wound Volume (cm^3) 110 cm^3 08/09/24 0925   Change in Wound Size % -65.41 08/09/24 0925   Undermining 1 4.4 08/09/24 0925   Undermining 2 2.8 08/09/24 0925   Undermining 1 is depth extending from 7-10 deepest at 4.4 08/09/24 0925   Undermining 2 is depth extending from 1-3 08/09/24 0925   Drainage Amount Large 08/09/24 0925   Drainage Description Yellow;Tan 08/09/24 0925   Non-staged Wound Description Full thickness 08/09/24 0925   Treatments Irrigation with NSS 08/09/24 0925       [REMOVED] Wound 01/18/24 Pressure Injury Hip Left (Removed)   Wound Image Images linked 08/09/24 0924       Wound 04/19/24 Pressure Injury Sacrum (Active)   Date First Assessed: 04/19/24   Primary Wound Type: Pressure Injury  Location: Sacrum  Wound Outcome: Palliative       [REMOVED] Wound 08/26/19 Buttocks Left;Distal;Lateral (Removed)   Resolved Date/Resolved Time: 08/26/20 1056  Date First Assessed/Time First Assessed: 08/26/19 1130   Pre-Existing Wound: Yes  Location: Buttocks  Wound Location Orientation: Left;Distal;Lateral  Wound Description (Comments): Deep ischial wound       [REMOVED] Wound 09/16/19 Buttocks Right;Distal (Removed)   Resolved Date/Resolved Time: 08/26/20 1055  Date First Assessed/Time First Assessed: 09/16/19 1657   Pre-Existing Wound: Yes  Location: Buttocks  Wound Location Orientation: Right;Distal       [REMOVED] Wound 10/28/19 Knee Left (Removed)   Resolved Date/Resolved Time: 08/26/20 1051  Date First Assessed/Time  First Assessed: 10/28/19 0657   Pre-Existing Wound: Yes  Location: Knee  Wound Location Orientation: Left  Wound Description (Comments): round black  Dressing Status: Open to air       [REMOVED] Wound 10/28/19 Buttocks Left;Mid (Removed)   Resolved Date/Resolved Time: 08/26/20 1056  Date First Assessed/Time First Assessed: 10/28/19 1108   Pre-Existing Wound: Yes  Location: Buttocks  Wound Location Orientation: Left;Mid  Wound Description (Comments): Stage 2 vs traumatic injury       [REMOVED] Wound 11/01/19 Other (Comment) N/A (Removed)   Resolved Date/Resolved Time: 08/26/20 1055  Date First Assessed/Time First Assessed: 11/01/19 1640   Location: Other (Comment)  Wound Location Orientation: N/A  Wound Description (Comments): scrotum dressed with exofin       [REMOVED] Wound 08/26/20 Pressure Injury Buttocks Left (Removed)   Resolved Date: 04/19/24  Date First Assessed/Time First Assessed: 08/26/20 1056   Primary Wound Type: Pressure Injury  Location: Buttocks  Wound Location Orientation: Left  Wound Outcome: (c) Converged       [REMOVED] Wound 08/26/20 Pressure Injury Hip Left (Removed)   Resolved Date: 10/04/23  Date First Assessed/Time First Assessed: 08/26/20 1057   Primary Wound Type: Pressure Injury  Location: Hip  Wound Location Orientation: Left  Wound Outcome: Palliative       [REMOVED] Wound 07/29/21 Pressure Injury Back Right;Lower;Lateral (Removed)   Resolved Date/Resolved Time: 01/23/24 1934  Date First Assessed: 07/29/21   Primary Wound Type: Pressure Injury  Location: Back  Wound Location Orientation: Right;Lower;Lateral  Wound Outcome: Healed       [REMOVED] Wound 11/05/21 Pressure Injury Sacrum (Removed)   Resolved Date: 04/19/24  Date First Assessed/Time First Assessed: 11/05/21 1059   Present on Original Admission: Yes  Primary Wound Type: Pressure Injury  Location: Sacrum  Wound Outcome: (c) Palliative       [REMOVED] Wound 05/27/22 Skin Tear Buttocks Left;Proximal (Removed)   Resolved  Date/Resolved Time: 08/05/22 0851  Date First Assessed/Time First Assessed: 05/27/22 0946   Primary Wound Type: Skin Tear  Location: Buttocks  Wound Location Orientation: Left;Proximal  Wound Outcome: Healed       [REMOVED] Wound 03/17/23 Pressure Injury Hip Lateral;Left;Proximal (Removed)   Resolved Date: 08/21/23  Date First Assessed/Time First Assessed: 03/17/23 1039   Primary Wound Type: Pressure Injury  Location: Hip  Wound Location Orientation: Lateral;Left;Proximal  Wound Outcome: Healed       [REMOVED] Wound 06/19/23 Pressure Injury Hip Left;Medial (Removed)   Resolved Date: 09/18/23  Date First Assessed: 06/19/23   Present on Original Admission: No  Primary Wound Type: Pressure Injury  Location: Hip  Wound Location Orientation: Left;Medial  Wound Description (Comments): granulation  yellow serous drainage ...       [REMOVED] Wound 10/13/23 Pressure Injury Finger (Comment which one) Right (Removed)   Resolved Date: 11/20/23  Date First Assessed/Time First Assessed: 10/13/23 1035   Primary Wound Type: Pressure Injury  Location: Finger (Comment which one)  Wound Location Orientation: Right  Wound Outcome: Healed       [REMOVED] Wound 01/17/24 Pressure Injury Flank Right;Upper (Removed)   Resolved Date: 02/02/24  Date First Assessed/Time First Assessed: 01/17/24 1926   Present on Original Admission: Yes  Primary Wound Type: Pressure Injury  Location: Flank  Wound Location Orientation: Right;Upper       [REMOVED] Wound 01/17/24 Knee Anterior;Left (Removed)   Resolved Date: 02/02/24  Date First Assessed/Time First Assessed: 01/17/24 1930   Location: Knee  Wound Location Orientation: Anterior;Left       [REMOVED] Wound 01/18/24 Pressure Injury Hip Left (Removed)   Resolved Date: 08/09/24  Date First Assessed/Time First Assessed: 01/18/24 1137   Primary Wound Type: Pressure Injury  Location: Hip  Wound Location Orientation: Left  Wound Outcome: Healed       [REMOVED] Wound 01/25/24 Chest Right (Removed)   Resolved  Date: 03/15/24  Date First Assessed/Time First Assessed: 01/25/24 1205   Location: Chest  Wound Location Orientation: Right  Wound Description (Comments): addy [id number] negative pressure wound therapy system  Incision's 1st Dressing: DRESSING ...       Subjective:      .    Mr. Arguello is a 61-year-old gentleman with paraplegia and history of multiple pressure wounds with 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.  He had been rescheduled for a debridement and flap closure by plastics in August but developed a new wound on his right mid to lower back chest wall.  This wound probes deep and has been closed on the outside but the tract has not been improving.  He had a CT scan that shows a deep tracking to the muscle but no fistulization to the internal thoracic cavity.    02/04/2022 he returns now for re-evaluation.  He still has had visiting nurses but has not been seen in the Wound Center since November.  He denies any change or complaint    03/11/2022 no changes since been seen last.  No new complaints.    04/22/2022 no issues.  No changes.    05/27/2022.  Doing well.  Denies fevers or chills.  No issues spoke about plastics a re-evaluation but he wants to wait for COVID to wane more and maybe next fall    07/22/2022.  He has not been seen here since May mostly due to transportation issues.  He has significant more pain and drainage on his right back chest wall wound.  Otherwise no changes    08/05/2022 denies fevers or chills.  Still in pain on the right side.    3/17/2023 he returns for evaluation.  He was seen in the wound center by another provider month or 2 ago.    6/9/2023.  Here for long-term follow-up.  No significant changes.    7/14/2023.  No changes.  Doing well.    9/22/2023 he had an outpatient CT scan.  The right chest wall wound has increasing drainage.  The dressings from the sacral area have some greenish color    10/13/2023.  He did receive a VAC.  He still has greenish drainage on the  dressings    10/27/2023 no significant changes.  He states he did get his hospital mattress.  Tolerating the VAC well.    12/8/2023.  He has had significant creasing drainage from the VAC dressing from the right back chest wall wound.  Difficulty maintaining enough canisters for the VAC machine.    12/22/2023 he denies any change.  Denies any fevers or chills.  Denies any issues.    4/19/2024 he returns now for resumption of care.  Since being seen last he did get admitted to [facility] and underwent extensive debridement of the chest wall wound rib resection with latissimus dorsi flap closure.  That wound did very well and the wound is healed.  He is here for evaluation of his chronic sacral and hip wounds.    5/31/2024.  Overall is doing well.  He was seen by plastics and his right side is healing well.  He is complain of more pain today from the sacrum.  Denies fevers or chills.  Visit nurses and his wife are doing dressing changes      6/20/2024.  Note complaints or changes since seen last.    8/9/2024 no issues.  No changes.  Still the same dressing changes.            The following portions of the patient's history were reviewed and updated as appropriate: allergies, current medications, past family history, past medical history, past social history, past surgical history and problem list.    Review of Systems   Constitutional:  Negative for chills and fever.   HENT:  Negative for ear pain and sore throat.    Eyes:  Negative for pain and visual disturbance.   Respiratory:  Negative for cough and shortness of breath.    Cardiovascular:  Negative for chest pain.   Gastrointestinal:  Negative for abdominal pain and vomiting.   Skin:  Positive for wound. Negative for color change.   Neurological:  Negative for seizures and syncope.   Psychiatric/Behavioral:  Negative for agitation and behavioral problems.    All other systems reviewed and are negative.        Objective:       Wound 04/19/24 Pressure  Injury Sacrum (Active)   Wound Image Images linked 08/09/24 0942   Wound Description Pink;Yellow;Other (Comment) (skin bridge with communication at 3) 08/09/24 0925   Shelby-wound Assessment Intact;Scar Tissue;Pink 08/09/24 0925   Wound Length (cm) 5.5 cm 08/09/24 0925   Wound Width (cm) 8 cm 08/09/24 0925   Wound Depth (cm) 2.5 cm 08/09/24 0925   Wound Surface Area (cm^2) 44 cm^2 08/09/24 0925   Wound Volume (cm^3) 110 cm^3 08/09/24 0925   Calculated Wound Volume (cm^3) 110 cm^3 08/09/24 0925   Change in Wound Size % -65.41 08/09/24 0925   Undermining 1 4.4 08/09/24 0925   Undermining 2 2.8 08/09/24 0925   Undermining 1 is depth extending from 7-10 deepest at 4.4 08/09/24 0925   Undermining 2 is depth extending from 1-3 08/09/24 0925   Drainage Amount Large 08/09/24 0925   Drainage Description Yellow;Tan 08/09/24 0925   Non-staged Wound Description Full thickness 08/09/24 0925   Treatments Irrigation with NSS 08/09/24 0925       [REMOVED] Wound 01/18/24 Pressure Injury Hip Left (Removed)   Wound Image Images linked 08/09/24 0924       /82   Temp 97.9 °F (36.6 °C)   Resp 16     Physical Exam  Vitals and nursing note reviewed. Exam conducted with a chaperone present.   Constitutional:       Appearance: Normal appearance.   Cardiovascular:      Rate and Rhythm: Normal rate and regular rhythm.   Pulmonary:      Effort: Pulmonary effort is normal.      Breath sounds: Normal breath sounds.   Abdominal:      General: There is no distension.      Palpations: Abdomen is soft. There is no mass.      Tenderness: There is no abdominal tenderness.      Comments: Ostomy   Neurological:      Mental Status: He is alert.   Psychiatric:         Mood and Affect: Mood normal.         Behavior: Behavior normal.           Wound Instructions:  Orders Placed This Encounter   Procedures    Wound cleansing and dressings Pressure Injury Sacrum     Shower, No. Do not get dressings wet.              Sacral wound:  Cleanse wounds with  normal saline. Pat dry   Skin prep to gaby-wound  Apply Calcium Alginate to all wound surfaces and under skin bridge followed by fluffed 4 x 4's to keep alginate in place.   Cover with ABD's.   Change dressings daily and as needed for excessive drainage        Keep pressure off of all wounds as much as possible, limit sitting in chair as much as possible       Continue VNA three x per week (wife will do in between) for dressing changes, except on the day the patient goes to the wound center.      OFF-LOADING   Avoid pressure at wound site. - all wounds, including right back   Wheelchair Cushion. - ROHO cushion     Do Not Sit for Long Periods of Time. - 1 hr max for meals only, otherwise in bed and turn side to side at least   every 3 hours or more frequently       Reposition at least every 1-2 hours while awake.       Follow up in 4 weeks with Dr. Yan.     Standing Status:   Future     Standing Expiration Date:   8/16/2024    Wound Procedure Treatment Pressure Injury Sacrum     This order was created via procedure documentation    Debridement     This order was created via procedure documentation        Diagnosis ICD-10-CM Associated Orders   1. Paraplegic spinal paralysis ([facility])  G82.20 Wound cleansing and dressings Pressure Injury Sacrum      2. Stage IV pressure ulcer of sacral region ([facility])  L89.154 Wound cleansing and dressings Pressure Injury Sacrum     lidocaine (XYLOCAINE) 4 % topical solution 5 mL     Wound Procedure Treatment Pressure Injury Sacrum      3. Type 2 diabetes mellitus without complication, without long-term current use of insulin ([facility])  E11.9

## 2024-08-09 NOTE — PATIENT INSTRUCTIONS
Orders Placed This Encounter   Procedures    Wound cleansing and dressings Pressure Injury Sacrum     Shower, No. Do not get dressings wet.              Sacral wound:  Cleanse wounds with normal saline. Pat dry   Skin prep to gaby-wound  Apply Calcium Alginate to all wound surfaces and under skin bridge followed by fluffed 4 x 4's to keep alginate in place.   Cover with ABD's.   Change dressings daily and as needed for excessive drainage        Keep pressure off of all wounds as much as possible, limit sitting in chair as much as possible       Continue VNA three x per week (wife will do in between) for dressing changes, except on the day the patient goes to the wound center.      OFF-LOADING   Avoid pressure at wound site. - all wounds, including right back   Wheelchair Cushion. - ROHO cushion     Do Not Sit for Long Periods of Time. - 1 hr max for meals only, otherwise in bed and turn side to side at least   every 3 hours or more frequently       Reposition at least every 1-2 hours while awake.       Follow up in 4 weeks with Dr. Yan.     Standing Status:   Future     Standing Expiration Date:   8/16/2024

## 2024-08-09 NOTE — ASSESSMENT & PLAN NOTE
The wounds are about the same.  There are still some undermining and skin bridge in the middle.  The base is debrided of slough and nonviable tissue and biofilm.  Continue the same care.  Follow-up in a month

## 2024-08-09 NOTE — PROGRESS NOTES
Wound Procedure Treatment Pressure Injury Sacrum    Performed by: Syd Del Rosario RN  Authorized by: Tex Yan MD    Associated wounds:   Wound 04/19/24 Pressure Injury Sacrum  Wound cleansed with:  NSS  Applied primary dressing:  Calcium alginate  Applied secondary dressing:  Gauze and ABD  Dressing secured with:  Tape

## 2024-08-12 ENCOUNTER — HOME CARE VISIT (OUTPATIENT)
Dept: HOME HEALTH SERVICES | Facility: HOME HEALTHCARE | Age: 64
End: 2024-08-12
Payer: MEDICARE

## 2024-08-12 PROCEDURE — G0299 HHS/HOSPICE OF RN EA 15 MIN: HCPCS

## 2024-08-13 ENCOUNTER — HOME CARE VISIT (OUTPATIENT)
Dept: HOME HEALTH SERVICES | Facility: HOME HEALTHCARE | Age: 64
End: 2024-08-13
Payer: MEDICARE

## 2024-08-13 VITALS
RESPIRATION RATE: 20 BRPM | DIASTOLIC BLOOD PRESSURE: 60 MMHG | HEART RATE: 76 BPM | TEMPERATURE: 97.6 F | OXYGEN SATURATION: 98 % | SYSTOLIC BLOOD PRESSURE: 120 MMHG

## 2024-08-14 ENCOUNTER — TELEPHONE (OUTPATIENT)
Dept: FAMILY MEDICINE CLINIC | Facility: CLINIC | Age: 64
End: 2024-08-14

## 2024-08-14 ENCOUNTER — HOME CARE VISIT (OUTPATIENT)
Dept: HOME HEALTH SERVICES | Facility: HOME HEALTHCARE | Age: 64
End: 2024-08-14
Payer: MEDICARE

## 2024-08-14 VITALS
RESPIRATION RATE: 16 BRPM | TEMPERATURE: 98 F | DIASTOLIC BLOOD PRESSURE: 68 MMHG | HEART RATE: 68 BPM | SYSTOLIC BLOOD PRESSURE: 100 MMHG

## 2024-08-14 NOTE — TELEPHONE ENCOUNTER
Pt called needs letter to be sent to [facility], [place]    Account number [id number]  Account Name: Rikki Farooqnichol     Shut off date 8/20/24

## 2024-08-16 ENCOUNTER — HOME CARE VISIT (OUTPATIENT)
Dept: HOME HEALTH SERVICES | Facility: HOME HEALTHCARE | Age: 64
End: 2024-08-16
Payer: MEDICARE

## 2024-08-16 PROCEDURE — G0299 HHS/HOSPICE OF RN EA 15 MIN: HCPCS

## 2024-08-18 VITALS
DIASTOLIC BLOOD PRESSURE: 60 MMHG | OXYGEN SATURATION: 97 % | RESPIRATION RATE: 18 BRPM | SYSTOLIC BLOOD PRESSURE: 120 MMHG | TEMPERATURE: 97.5 F | HEART RATE: 78 BPM

## 2024-08-19 ENCOUNTER — HOME CARE VISIT (OUTPATIENT)
Dept: HOME HEALTH SERVICES | Facility: HOME HEALTHCARE | Age: 64
End: 2024-08-19
Payer: MEDICARE

## 2024-08-19 PROCEDURE — G0299 HHS/HOSPICE OF RN EA 15 MIN: HCPCS

## 2024-08-19 NOTE — TELEPHONE ENCOUNTER
[employer] Medical Certification signed by Leigh Ann Toure and faxed on 8/19. Confirmation received and scanned into chart.

## 2024-08-21 ENCOUNTER — HOME CARE VISIT (OUTPATIENT)
Dept: HOME HEALTH SERVICES | Facility: HOME HEALTHCARE | Age: 64
End: 2024-08-21
Payer: MEDICARE

## 2024-08-21 VITALS
DIASTOLIC BLOOD PRESSURE: 60 MMHG | HEART RATE: 78 BPM | SYSTOLIC BLOOD PRESSURE: 120 MMHG | RESPIRATION RATE: 18 BRPM | OXYGEN SATURATION: 97 % | TEMPERATURE: 97.8 F

## 2024-08-22 NOTE — CASE COMMUNICATION
FYI only, CMS regulation require us to notify you that there is a deviation from SN visit pattern for this weeks Pt will be seen 2x this week instead of projected 3x/wk visit pattern. Next visit is scheduled for 8/23/24. Pt agreeable to call VNA w/ questions/concerns.

## 2024-08-23 ENCOUNTER — HOME CARE VISIT (OUTPATIENT)
Dept: HOME HEALTH SERVICES | Facility: HOME HEALTHCARE | Age: 64
End: 2024-08-23
Payer: MEDICARE

## 2024-08-23 PROCEDURE — G0299 HHS/HOSPICE OF RN EA 15 MIN: HCPCS

## 2024-08-25 VITALS
DIASTOLIC BLOOD PRESSURE: 60 MMHG | TEMPERATURE: 97.8 F | HEART RATE: 84 BPM | OXYGEN SATURATION: 98 % | SYSTOLIC BLOOD PRESSURE: 120 MMHG | RESPIRATION RATE: 8 BRPM

## 2024-08-26 ENCOUNTER — HOME CARE VISIT (OUTPATIENT)
Dept: HOME HEALTH SERVICES | Facility: HOME HEALTHCARE | Age: 64
End: 2024-08-26
Payer: MEDICARE

## 2024-08-26 PROCEDURE — G0299 HHS/HOSPICE OF RN EA 15 MIN: HCPCS

## 2024-08-27 VITALS
RESPIRATION RATE: 18 BRPM | TEMPERATURE: 97.2 F | DIASTOLIC BLOOD PRESSURE: 60 MMHG | SYSTOLIC BLOOD PRESSURE: 120 MMHG | OXYGEN SATURATION: 97 % | HEART RATE: 78 BPM

## 2024-08-28 ENCOUNTER — HOME CARE VISIT (OUTPATIENT)
Dept: HOME HEALTH SERVICES | Facility: HOME HEALTHCARE | Age: 64
End: 2024-08-28
Payer: MEDICARE

## 2024-08-28 DIAGNOSIS — F11.20 CONTINUOUS OPIOID DEPENDENCE (HCC): ICD-10-CM

## 2024-08-28 DIAGNOSIS — G89.29 CHRONIC LOW BACK PAIN WITHOUT SCIATICA, UNSPECIFIED BACK PAIN LATERALITY: ICD-10-CM

## 2024-08-28 DIAGNOSIS — M54.50 CHRONIC LOW BACK PAIN WITHOUT SCIATICA, UNSPECIFIED BACK PAIN LATERALITY: ICD-10-CM

## 2024-08-28 DIAGNOSIS — M86.68 OTHER CHRONIC OSTEOMYELITIS, OTHER SITE (HCC): ICD-10-CM

## 2024-08-28 PROCEDURE — G0299 HHS/HOSPICE OF RN EA 15 MIN: HCPCS

## 2024-08-29 ENCOUNTER — TELEPHONE (OUTPATIENT)
Dept: HOME HEALTH SERVICES | Facility: HOME HEALTHCARE | Age: 64
End: 2024-08-29

## 2024-08-29 VITALS — HEART RATE: 67 BPM | RESPIRATION RATE: 16 BRPM | TEMPERATURE: 97.4 F | OXYGEN SATURATION: 98 %

## 2024-08-30 ENCOUNTER — HOME CARE VISIT (OUTPATIENT)
Dept: HOME HEALTH SERVICES | Facility: HOME HEALTHCARE | Age: 64
End: 2024-08-30
Payer: MEDICARE

## 2024-08-30 RX ORDER — OXYCODONE HYDROCHLORIDE 15 MG/1
15 TABLET ORAL EVERY 6 HOURS PRN
Qty: 120 TABLET | Refills: 0 | Status: SHIPPED | OUTPATIENT
Start: 2024-08-30

## 2024-09-02 ENCOUNTER — HOME CARE VISIT (OUTPATIENT)
Dept: HOME HEALTH SERVICES | Facility: HOME HEALTHCARE | Age: 64
End: 2024-09-02
Payer: MEDICARE

## 2024-09-02 PROCEDURE — G0299 HHS/HOSPICE OF RN EA 15 MIN: HCPCS

## 2024-09-03 ENCOUNTER — HOME CARE VISIT (OUTPATIENT)
Dept: HOME HEALTH SERVICES | Facility: HOME HEALTHCARE | Age: 64
End: 2024-09-03
Payer: MEDICARE

## 2024-09-03 VITALS
RESPIRATION RATE: 20 BRPM | OXYGEN SATURATION: 98 % | DIASTOLIC BLOOD PRESSURE: 74 MMHG | HEART RATE: 100 BPM | SYSTOLIC BLOOD PRESSURE: 108 MMHG | TEMPERATURE: 47.7 F

## 2024-09-03 PROCEDURE — G0300 HHS/HOSPICE OF LPN EA 15 MIN: HCPCS

## 2024-09-04 ENCOUNTER — HOME CARE VISIT (OUTPATIENT)
Dept: HOME HEALTH SERVICES | Facility: HOME HEALTHCARE | Age: 64
End: 2024-09-04
Payer: MEDICARE

## 2024-09-04 VITALS
HEART RATE: 80 BPM | SYSTOLIC BLOOD PRESSURE: 116 MMHG | TEMPERATURE: 97.5 F | DIASTOLIC BLOOD PRESSURE: 68 MMHG | RESPIRATION RATE: 18 BRPM | OXYGEN SATURATION: 99 %

## 2024-09-04 NOTE — CASE COMMUNICATION
No SN visit needed 9/6. Patient has an appt with Dr. Yan for wound care. Patient is aware of same. Please update schedule. Agreeable to next scheduled visit on Monday 9/9/24.

## 2024-09-04 NOTE — CASE COMMUNICATION
[employer]  16fr 5cc cath [id number] QTY 1   Syringe 10cc [id number] QTY 1   10cc cath tray [id number] QTY 1   Leg bag large BARD [id number] QTY 3

## 2024-09-06 ENCOUNTER — HOME CARE VISIT (OUTPATIENT)
Dept: HOME HEALTH SERVICES | Facility: HOME HEALTHCARE | Age: 64
End: 2024-09-06
Payer: MEDICARE

## 2024-09-07 ENCOUNTER — HOME CARE VISIT (OUTPATIENT)
Dept: HOME HEALTH SERVICES | Facility: HOME HEALTHCARE | Age: 64
End: 2024-09-07
Payer: MEDICARE

## 2024-09-07 PROCEDURE — G0299 HHS/HOSPICE OF RN EA 15 MIN: HCPCS

## 2024-09-09 ENCOUNTER — HOME CARE VISIT (OUTPATIENT)
Dept: HOME HEALTH SERVICES | Facility: HOME HEALTHCARE | Age: 64
End: 2024-09-09
Payer: MEDICARE

## 2024-09-09 PROCEDURE — G0299 HHS/HOSPICE OF RN EA 15 MIN: HCPCS

## 2024-09-11 ENCOUNTER — HOME CARE VISIT (OUTPATIENT)
Dept: HOME HEALTH SERVICES | Facility: HOME HEALTHCARE | Age: 64
End: 2024-09-11
Payer: MEDICARE

## 2024-09-11 ENCOUNTER — HOME CARE VISIT (OUTPATIENT)
Dept: HOME HEALTH SERVICES | Facility: HOME HEALTHCARE | Age: 64
End: 2024-09-11

## 2024-09-11 VITALS
OXYGEN SATURATION: 96 % | TEMPERATURE: 97.3 F | SYSTOLIC BLOOD PRESSURE: 108 MMHG | RESPIRATION RATE: 18 BRPM | DIASTOLIC BLOOD PRESSURE: 60 MMHG | HEART RATE: 78 BPM

## 2024-09-11 VITALS
DIASTOLIC BLOOD PRESSURE: 80 MMHG | TEMPERATURE: 97.3 F | OXYGEN SATURATION: 100 % | SYSTOLIC BLOOD PRESSURE: 130 MMHG | RESPIRATION RATE: 18 BRPM | HEART RATE: 75 BPM

## 2024-09-11 PROCEDURE — G0299 HHS/HOSPICE OF RN EA 15 MIN: HCPCS

## 2024-09-11 NOTE — ASSESSMENT & PLAN NOTE
· repleted Communicate Risk of Fall with Harm to all staff/Reinforce activity limits and safety measures with patient and family/Tailored Fall Risk Interventions/Visual Cue: Yellow wristband and red socks/Bed in lowest position, wheels locked, appropriate side rails in place/Call bell, personal items and telephone in reach/Instruct patient to call for assistance before getting out of bed or chair/Non-slip footwear when patient is out of bed/[place] to call system/Physically safe environment - no spills, clutter or unnecessary equipment/Purposeful Proactive Rounding/Room/bathroom lighting operational, light cord in reach

## 2024-09-13 ENCOUNTER — HOME CARE VISIT (OUTPATIENT)
Dept: HOME HEALTH SERVICES | Facility: HOME HEALTHCARE | Age: 64
End: 2024-09-13
Payer: MEDICARE

## 2024-09-16 ENCOUNTER — HOME CARE VISIT (OUTPATIENT)
Dept: HOME HEALTH SERVICES | Facility: HOME HEALTHCARE | Age: 64
End: 2024-09-16
Payer: MEDICARE

## 2024-09-16 VITALS
DIASTOLIC BLOOD PRESSURE: 60 MMHG | TEMPERATURE: 97.3 F | RESPIRATION RATE: 20 BRPM | OXYGEN SATURATION: 99 % | SYSTOLIC BLOOD PRESSURE: 112 MMHG | HEART RATE: 63 BPM

## 2024-09-16 PROCEDURE — G0299 HHS/HOSPICE OF RN EA 15 MIN: HCPCS

## 2024-09-17 ENCOUNTER — TELEPHONE (OUTPATIENT)
Dept: FAMILY MEDICINE CLINIC | Facility: CLINIC | Age: 64
End: 2024-09-17

## 2024-09-17 NOTE — TELEPHONE ENCOUNTER
Pt called needs letter to be sent to [facility]     Account number [id number]  Account Name: Rikki Farooqnichol      Shut off date 9/18/24

## 2024-09-17 NOTE — TELEPHONE ENCOUNTER
[employer] Medical Certification signed by Leigh Ann Toure and faxed on 9/17/24. Confirmation received and scanned into chart. Left message to inform patient.

## 2024-09-18 ENCOUNTER — TELEPHONE (OUTPATIENT)
Dept: FAMILY MEDICINE CLINIC | Facility: CLINIC | Age: 64
End: 2024-09-18

## 2024-09-18 ENCOUNTER — HOME CARE VISIT (OUTPATIENT)
Dept: HOME HEALTH SERVICES | Facility: HOME HEALTHCARE | Age: 64
End: 2024-09-18
Payer: MEDICARE

## 2024-09-18 PROCEDURE — G0299 HHS/HOSPICE OF RN EA 15 MIN: HCPCS

## 2024-09-20 ENCOUNTER — HOME CARE VISIT (OUTPATIENT)
Dept: HOME HEALTH SERVICES | Facility: HOME HEALTHCARE | Age: 64
End: 2024-09-20
Payer: MEDICARE

## 2024-09-20 ENCOUNTER — OFFICE VISIT (OUTPATIENT)
Dept: WOUND CARE | Facility: CLINIC | Age: 64
End: 2024-09-20
Payer: MEDICARE

## 2024-09-20 VITALS
DIASTOLIC BLOOD PRESSURE: 60 MMHG | OXYGEN SATURATION: 98 % | TEMPERATURE: 97.2 F | HEART RATE: 72 BPM | SYSTOLIC BLOOD PRESSURE: 120 MMHG | RESPIRATION RATE: 18 BRPM

## 2024-09-20 VITALS
RESPIRATION RATE: 18 BRPM | HEART RATE: 68 BPM | TEMPERATURE: 97.2 F | DIASTOLIC BLOOD PRESSURE: 82 MMHG | SYSTOLIC BLOOD PRESSURE: 126 MMHG

## 2024-09-20 DIAGNOSIS — L89.223 STAGE III PRESSURE ULCER OF LEFT HIP (HCC): Primary | ICD-10-CM

## 2024-09-20 DIAGNOSIS — L89.324 LEFT PERINEAL ISCHIAL PRESSURE ULCER, STAGE IV (HCC): ICD-10-CM

## 2024-09-20 DIAGNOSIS — L89.154 STAGE IV PRESSURE ULCER OF SACRAL REGION (HCC): ICD-10-CM

## 2024-09-20 PROCEDURE — 11042 DBRDMT SUBQ TIS 1ST 20SQCM/<: CPT | Performed by: SURGERY

## 2024-09-20 PROCEDURE — 11045 DBRDMT SUBQ TISS EACH ADDL: CPT | Performed by: SURGERY

## 2024-09-20 NOTE — PATIENT INSTRUCTIONS
Orders Placed This Encounter   Procedures    Wound cleansing and dressings Pressure Injury Sacrum     Wash your hands with soap and water.    Remove old dressing, discard into plastic bag and place in trash.    Cleanse the wound with Mild Soap & Water prior to applying a clean dressing.   Do not use tissue or cotton balls. Do not scrub the wound. Pat dry using gauze.    Sacral wound:  Cleanse wounds with normal saline. Pat dry   Skin prep to gaby-wound  Apply Calcium Alginate to all wound surfaces and under skin bridge followed by fluffed 4 x 4's to keep alginate in place.   Cover with ABD's.   Change dressings daily and as needed for excessive drainage         Keep pressure off of all wounds as much as possible, limit sitting in chair as much as possible       Continue VNA three x per week (wife will do in between) for dressing changes, except on the day the patient goes to the wound center.      OFF-LOADING   Avoid pressure at wound site. - all wounds, including right back   Wheelchair Cushion. - ROHO cushion      Do Not Sit for Long Periods of Time. - 1 hr max for meals only, otherwise in bed and turn side to side at least   every 3 hours or more frequently         Reposition at least every 1-2 hours while awake.       Follow up in 4 weeks with Dr. Yan.     Standing Status:   Future     Standing Expiration Date:   9/27/2024    Wound Procedure Treatment Pressure Injury Sacrum     This order was created via procedure documentation

## 2024-09-20 NOTE — PROGRESS NOTES
"Patient ID: Rikki Arguello is a 64 y.o. male Date of Birth 1960     Chief Complaint  Chief Complaint   Patient presents with    Follow Up Wound Care Visit     Sacral wound       Allergies  Patient has no known allergies.    Assessment:    Stage IV pressure ulcer of sacral region (HCC)  The wound is similar in size.  Still with areas of undermining and a few skin bridges.  The base was debrided with a curette of all nonviable and slough.  Continue the same care.  Follow-up in 1 month.                 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  Consent given by: patient  Time out: Immediately prior to procedure a \"time out\" was called to verify the correct patient, procedure, equipment, support staff and site/side marked as required.    Debridement Details  Performed by: physician  Debridement type: surgical  Level of debridement: subcutaneous tissue  Pain control: lidocaine 4%      Post-debridement measurements  Length (cm): 5.7  Width (cm): 8.4  Depth (cm): 2.6  Percent debrided: 100%  Surface Area (cm^2): 47.88  Area Debrided (cm^2): 47.88  Volume (cm^3): 124.49    Tissue and other material debrided: subcutaneous tissue  Devitalized tissue debrided: biofilm and slough  Instrument(s) utilized: curette  Procedural pain (0-10): insensate  Post-procedural pain: insensate   Response to treatment: procedure was tolerated well        Plan:     Wound 04/19/24 Pressure Injury Sacrum (Active)   Wound Image Images linked 09/20/24 1043   Wound Description Pink;Yellow;Other (Comment) (skin bridge) 09/20/24 1025   Pressure Injury Stage " 4 09/20/24 1025   Shelby-wound Assessment Intact;Scar Tissue;Pink 09/20/24 1025   Wound Length (cm) 5.7 cm 09/20/24 1025   Wound Width (cm) 8.4 cm 09/20/24 1025   Wound Depth (cm) 2.6 cm 09/20/24 1025   Wound Surface Area (cm^2) 47.88 cm^2 09/20/24 1025   Wound Volume (cm^3) 124.488 cm^3 09/20/24 1025   Calculated Wound Volume (cm^3) 124.49 cm^3 09/20/24 1025   Change in Wound Size % -87.2 09/20/24 1025   Tunneling 1 3 cm 09/20/24 1025   Tunneling 1 in depth located at 1 o clock 09/20/24 1025   Undermining 1 6.5 09/20/24 1025   Undermining 1 is depth extending from 3 o clock to 9 o clock deepest at 4 o clock 09/20/24 1025   Drainage Amount Large 09/20/24 1025   Drainage Description Milky;Tan;Yellow 09/20/24 1025   Non-staged Wound Description Full thickness 09/20/24 1025   Wound packed? Yes 09/20/24 1025   Packing- # removed 1 09/20/24 1025       Wound 04/19/24 Pressure Injury Sacrum (Active)   Date First Assessed: 04/19/24   Primary Wound Type: Pressure Injury  Location: Sacrum  Wound Outcome: Palliative       [REMOVED] Wound 08/26/19 Buttocks Left;Distal;Lateral (Removed)   Resolved Date/Resolved Time: 08/26/20 1056  Date First Assessed/Time First Assessed: 08/26/19 1130   Pre-Existing Wound: Yes  Location: Buttocks  Wound Location Orientation: Left;Distal;Lateral  Wound Description (Comments): Deep ischial wound       [REMOVED] Wound 09/16/19 Buttocks Right;Distal (Removed)   Resolved Date/Resolved Time: 08/26/20 1055  Date First Assessed/Time First Assessed: 09/16/19 1657   Pre-Existing Wound: Yes  Location: Buttocks  Wound Location Orientation: Right;Distal       [REMOVED] Wound 10/28/19 Knee Left (Removed)   Resolved Date/Resolved Time: 08/26/20 1055  Date First Assessed/Time First Assessed: 10/28/19 0657   Pre-Existing Wound: Yes  Location: Knee  Wound Location Orientation: Left  Wound Description (Comments): round black  Dressing Status: Open to air       [REMOVED] Wound 10/28/19 Buttocks Left;Mid (Removed)    Resolved Date/Resolved Time: 08/26/20 1056  Date First Assessed/Time First Assessed: 10/28/19 1108   Pre-Existing Wound: Yes  Location: Buttocks  Wound Location Orientation: Left;Mid  Wound Description (Comments): Stage 2 vs traumatic injury       [REMOVED] Wound 11/01/19 Other (Comment) N/A (Removed)   Resolved Date/Resolved Time: 08/26/20 1055  Date First Assessed/Time First Assessed: 11/01/19 1640   Location: Other (Comment)  Wound Location Orientation: N/A  Wound Description (Comments): scrotum dressed with exofin       [REMOVED] Wound 08/26/20 Pressure Injury Buttocks Left (Removed)   Resolved Date: 04/19/24  Date First Assessed/Time First Assessed: 08/26/20 1056   Primary Wound Type: Pressure Injury  Location: Buttocks  Wound Location Orientation: Left  Wound Outcome: (c) Converged       [REMOVED] Wound 08/26/20 Pressure Injury Hip Left (Removed)   Resolved Date: 10/04/23  Date First Assessed/Time First Assessed: 08/26/20 1057   Primary Wound Type: Pressure Injury  Location: Hip  Wound Location Orientation: Left  Wound Outcome: Palliative       [REMOVED] Wound 07/29/21 Pressure Injury Back Right;Lower;Lateral (Removed)   Resolved Date/Resolved Time: 01/23/24 1934  Date First Assessed: 07/29/21   Primary Wound Type: Pressure Injury  Location: Back  Wound Location Orientation: Right;Lower;Lateral  Wound Outcome: Healed       [REMOVED] Wound 11/05/21 Pressure Injury Sacrum (Removed)   Resolved Date: 04/19/24  Date First Assessed/Time First Assessed: 11/05/21 1059   Present on Original Admission: Yes  Primary Wound Type: Pressure Injury  Location: Sacrum  Wound Outcome: (c) Palliative       [REMOVED] Wound 05/27/22 Skin Tear Buttocks Left;Proximal (Removed)   Resolved Date/Resolved Time: 08/05/22 0851  Date First Assessed/Time First Assessed: 05/27/22 0946   Primary Wound Type: Skin Tear  Location: Buttocks  Wound Location Orientation: Left;Proximal  Wound Outcome: Healed       [REMOVED] Wound 03/17/23 Pressure  Injury Hip Lateral;Left;Proximal (Removed)   Resolved Date: 08/21/23  Date First Assessed/Time First Assessed: 03/17/23 1039   Primary Wound Type: Pressure Injury  Location: Hip  Wound Location Orientation: Lateral;Left;Proximal  Wound Outcome: Healed       [REMOVED] Wound 06/19/23 Pressure Injury Hip Left;Medial (Removed)   Resolved Date: 09/18/23  Date First Assessed: 06/19/23   Present on Original Admission: No  Primary Wound Type: Pressure Injury  Location: Hip  Wound Location Orientation: Left;Medial  Wound Description (Comments): granulation  yellow serous drainage ...       [REMOVED] Wound 10/13/23 Pressure Injury Finger (Comment which one) Right (Removed)   Resolved Date: 11/20/23  Date First Assessed/Time First Assessed: 10/13/23 1035   Primary Wound Type: Pressure Injury  Location: Finger (Comment which one)  Wound Location Orientation: Right  Wound Outcome: Healed       [REMOVED] Wound 01/17/24 Pressure Injury Flank Right;Upper (Removed)   Resolved Date: 02/02/24  Date First Assessed/Time First Assessed: 01/17/24 1926   Present on Original Admission: Yes  Primary Wound Type: Pressure Injury  Location: Flank  Wound Location Orientation: Right;Upper       [REMOVED] Wound 01/17/24 Knee Anterior;Left (Removed)   Resolved Date: 02/02/24  Date First Assessed/Time First Assessed: 01/17/24 1930   Location: Knee  Wound Location Orientation: Anterior;Left       [REMOVED] Wound 01/18/24 Pressure Injury Hip Left (Removed)   Resolved Date: 08/09/24  Date First Assessed/Time First Assessed: 01/18/24 1137   Primary Wound Type: Pressure Injury  Location: Hip  Wound Location Orientation: Left  Wound Outcome: Healed       [REMOVED] Wound 01/25/24 Chest Right (Removed)   Resolved Date: 03/15/24  Date First Assessed/Time First Assessed: 01/25/24 1205   Location: Chest  Wound Location Orientation: Right  Wound Description (Comments): addy [id number] negative pressure wound therapy system  Incision's 1st Dressing: DRESSING ...        Subjective:      .    Mr. Arguello is a 61-year-old gentleman with paraplegia and history of multiple pressure wounds with 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.  He had been rescheduled for a debridement and flap closure by plastics in August but developed a new wound on his right mid to lower back chest wall.  This wound probes deep and has been closed on the outside but the tract has not been improving.  He had a CT scan that shows a deep tracking to the muscle but no fistulization to the internal thoracic cavity.    02/04/2022 he returns now for re-evaluation.  He still has had visiting nurses but has not been seen in the Wound Center since November.  He denies any change or complaint    03/11/2022 no changes since been seen last.  No new complaints.    04/22/2022 no issues.  No changes.    05/27/2022.  Doing well.  Denies fevers or chills.  No issues spoke about plastics a re-evaluation but he wants to wait for COVID to wane more and maybe next fall    07/22/2022.  He has not been seen here since May mostly due to transportation issues.  He has significant more pain and drainage on his right back chest wall wound.  Otherwise no changes    08/05/2022 denies fevers or chills.  Still in pain on the right side.    3/17/2023 he returns for evaluation.  He was seen in the wound center by another provider month or 2 ago.    6/9/2023.  Here for long-term follow-up.  No significant changes.    7/14/2023.  No changes.  Doing well.    9/22/2023 he had an outpatient CT scan.  The right chest wall wound has increasing drainage.  The dressings from the sacral area have some greenish color    10/13/2023.  He did receive a VAC.  He still has greenish drainage on the dressings    10/27/2023 no significant changes.  He states he did get his hospital mattress.  Tolerating the VAC well.    12/8/2023.  He has had significant creasing drainage from the VAC dressing from the right back chest wall wound.  Difficulty  maintaining enough canisters for the VAC machine.    12/22/2023 he denies any change.  Denies any fevers or chills.  Denies any issues.    4/19/2024 he returns now for resumption of care.  Since being seen last he did get admitted to [facility] and underwent extensive debridement of the chest wall wound rib resection with latissimus dorsi flap closure.  That wound did very well and the wound is healed.  He is here for evaluation of his chronic sacral and hip wounds.    5/31/2024.  Overall is doing well.  He was seen by plastics and his right side is healing well.  He is complain of more pain today from the sacrum.  Denies fevers or chills.  Visit nurses and his wife are doing dressing changes      6/20/2024.  Note complaints or changes since seen last.    8/9/2024 no issues.  No changes.  Still the same dressing changes.    9/20/2024.  He reports no changes.  Doing well.  No new wounds.            The following portions of the patient's history were reviewed and updated as appropriate: allergies, current medications, past family history, past medical history, past social history, past surgical history and problem list.    Review of Systems   Constitutional:  Negative for chills and fever.   HENT:  Negative for ear pain and sore throat.    Eyes:  Negative for pain and visual disturbance.   Respiratory:  Negative for cough and shortness of breath.    Cardiovascular:  Negative for chest pain.   Gastrointestinal:  Negative for abdominal pain and vomiting.   Skin:  Positive for wound. Negative for color change.   Neurological:  Negative for seizures and syncope.   Psychiatric/Behavioral:  Negative for agitation and behavioral problems.    All other systems reviewed and are negative.        Objective:       Wound 04/19/24 Pressure Injury Sacrum (Active)   Wound Image Images linked 09/20/24 1043   Wound Description Pink;Yellow;Other (Comment) (skin bridge) 09/20/24 1025   Pressure Injury Stage 4 09/20/24 1025    Shelby-wound Assessment Intact;Scar Tissue;Pink 09/20/24 1025   Wound Length (cm) 5.7 cm 09/20/24 1025   Wound Width (cm) 8.4 cm 09/20/24 1025   Wound Depth (cm) 2.6 cm 09/20/24 1025   Wound Surface Area (cm^2) 47.88 cm^2 09/20/24 1025   Wound Volume (cm^3) 124.488 cm^3 09/20/24 1025   Calculated Wound Volume (cm^3) 124.49 cm^3 09/20/24 1025   Change in Wound Size % -87.2 09/20/24 1025   Tunneling 1 3 cm 09/20/24 1025   Tunneling 1 in depth located at 1 o clock 09/20/24 1025   Undermining 1 6.5 09/20/24 1025   Undermining 1 is depth extending from 3 o clock to 9 o clock deepest at 4 o clock 09/20/24 1025   Drainage Amount Large 09/20/24 1025   Drainage Description Milky;Tan;Yellow 09/20/24 1025   Non-staged Wound Description Full thickness 09/20/24 1025   Wound packed? Yes 09/20/24 1025   Packing- # removed 1 09/20/24 1025       /82   Pulse 68   Temp (!) 97.2 °F (36.2 °C)   Resp 18     Physical Exam  Vitals and nursing note reviewed. Exam conducted with a chaperone present.   Constitutional:       Appearance: Normal appearance.   Cardiovascular:      Rate and Rhythm: Normal rate and regular rhythm.   Pulmonary:      Effort: Pulmonary effort is normal.      Breath sounds: Normal breath sounds.   Abdominal:      General: There is no distension.      Palpations: Abdomen is soft. There is no mass.      Tenderness: There is no abdominal tenderness.      Comments: Ostomy   Neurological:      Mental Status: He is alert.   Psychiatric:         Mood and Affect: Mood normal.         Behavior: Behavior normal.           Wound Instructions:  Orders Placed This Encounter   Procedures    Wound cleansing and dressings Pressure Injury Sacrum     Wash your hands with soap and water.    Remove old dressing, discard into plastic bag and place in trash.    Cleanse the wound with Mild Soap & Water prior to applying a clean dressing.   Do not use tissue or cotton balls. Do not scrub the wound. Pat dry using gauze.    Sacral  wound:  Cleanse wounds with normal saline. Pat dry   Skin prep to gaby-wound  Apply Calcium Alginate to all wound surfaces and under skin bridge followed by fluffed 4 x 4's to keep alginate in place.   Cover with ABD's.   Change dressings daily and as needed for excessive drainage         Keep pressure off of all wounds as much as possible, limit sitting in chair as much as possible       Continue VNA three x per week (wife will do in between) for dressing changes, except on the day the patient goes to the wound center.      OFF-LOADING   Avoid pressure at wound site. - all wounds, including right back   Wheelchair Cushion. - ROHO cushion      Do Not Sit for Long Periods of Time. - 1 hr max for meals only, otherwise in bed and turn side to side at least   every 3 hours or more frequently         Reposition at least every 1-2 hours while awake.       Follow up in 4 weeks with Dr. Yan.     Standing Status:   Future     Standing Expiration Date:   9/27/2024    Wound Procedure Treatment Pressure Injury Sacrum     This order was created via procedure documentation    Debridement     This order was created via procedure documentation        Diagnosis ICD-10-CM Associated Orders   1. 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 ([facility])  L89.223       2. Left perineal ischial pressure ulcer, stage IV ([facility])  L89.324       3. Stage IV pressure ulcer of sacral region (HCC)  L89.154 Wound cleansing and dressings Pressure Injury Sacrum     Wound Procedure Treatment Pressure Injury Sacrum

## 2024-09-20 NOTE — PROGRESS NOTES
Wound Procedure Treatment Pressure Injury Sacrum    Performed by: Tran Worrell RN  Authorized by: Tex Yan MD    Associated wounds:   Wound 04/19/24 Pressure Injury Sacrum  Wound cleansed with:  NSS  Applied primary dressing:  Silver and Calcium alginate  Applied secondary dressing:  Gauze and ABD  Dressing secured with:  Tape

## 2024-09-20 NOTE — ASSESSMENT & PLAN NOTE
The wound is similar in size.  Still with areas of undermining and a few skin bridges.  The base was debrided with a curette of all nonviable and slough.  Continue the same care.  Follow-up in 1 month.

## 2024-09-23 ENCOUNTER — HOME CARE VISIT (OUTPATIENT)
Dept: HOME HEALTH SERVICES | Facility: HOME HEALTHCARE | Age: 64
End: 2024-09-23
Payer: MEDICARE

## 2024-09-23 PROCEDURE — G0299 HHS/HOSPICE OF RN EA 15 MIN: HCPCS

## 2024-09-24 VITALS
SYSTOLIC BLOOD PRESSURE: 110 MMHG | OXYGEN SATURATION: 97 % | DIASTOLIC BLOOD PRESSURE: 60 MMHG | RESPIRATION RATE: 18 BRPM | TEMPERATURE: 97.3 F | HEART RATE: 76 BPM

## 2024-09-25 ENCOUNTER — HOME CARE VISIT (OUTPATIENT)
Dept: HOME HEALTH SERVICES | Facility: HOME HEALTHCARE | Age: 64
End: 2024-09-25
Payer: MEDICARE

## 2024-09-25 PROCEDURE — G0299 HHS/HOSPICE OF RN EA 15 MIN: HCPCS

## 2024-09-26 ENCOUNTER — OFFICE VISIT (OUTPATIENT)
Dept: FAMILY MEDICINE CLINIC | Facility: CLINIC | Age: 64
End: 2024-09-26

## 2024-09-26 ENCOUNTER — APPOINTMENT (OUTPATIENT)
Dept: LAB | Facility: CLINIC | Age: 64
End: 2024-09-26
Payer: MEDICARE

## 2024-09-26 ENCOUNTER — TELEPHONE (OUTPATIENT)
Age: 64
End: 2024-09-26

## 2024-09-26 VITALS
RESPIRATION RATE: 18 BRPM | OXYGEN SATURATION: 98 % | HEART RATE: 76 BPM | SYSTOLIC BLOOD PRESSURE: 106 MMHG | TEMPERATURE: 98.4 F | DIASTOLIC BLOOD PRESSURE: 72 MMHG

## 2024-09-26 DIAGNOSIS — R97.20 ELEVATED PROSTATE SPECIFIC ANTIGEN (PSA): ICD-10-CM

## 2024-09-26 DIAGNOSIS — M86.68 OTHER CHRONIC OSTEOMYELITIS, OTHER SITE (HCC): ICD-10-CM

## 2024-09-26 DIAGNOSIS — Z23 FLU VACCINE NEED: ICD-10-CM

## 2024-09-26 DIAGNOSIS — F11.20 CONTINUOUS OPIOID DEPENDENCE (HCC): ICD-10-CM

## 2024-09-26 DIAGNOSIS — D50.9 IRON DEFICIENCY ANEMIA, UNSPECIFIED IRON DEFICIENCY ANEMIA TYPE: ICD-10-CM

## 2024-09-26 DIAGNOSIS — M54.50 CHRONIC LOW BACK PAIN WITHOUT SCIATICA, UNSPECIFIED BACK PAIN LATERALITY: ICD-10-CM

## 2024-09-26 DIAGNOSIS — G82.20 PARAPLEGIC SPINAL PARALYSIS (HCC): ICD-10-CM

## 2024-09-26 DIAGNOSIS — E11.9 TYPE 2 DIABETES MELLITUS WITHOUT COMPLICATION, WITHOUT LONG-TERM CURRENT USE OF INSULIN (HCC): Primary | ICD-10-CM

## 2024-09-26 DIAGNOSIS — K21.9 GASTROESOPHAGEAL REFLUX DISEASE WITHOUT ESOPHAGITIS: ICD-10-CM

## 2024-09-26 DIAGNOSIS — G89.29 CHRONIC LOW BACK PAIN WITHOUT SCIATICA, UNSPECIFIED BACK PAIN LATERALITY: ICD-10-CM

## 2024-09-26 DIAGNOSIS — I10 BENIGN ESSENTIAL HYPERTENSION: ICD-10-CM

## 2024-09-26 PROBLEM — S31.829A: Status: RESOLVED | Noted: 2022-05-27 | Resolved: 2024-09-26

## 2024-09-26 PROBLEM — L89.44 PRESSURE INJURY OF CONTIGUOUS REGION INVOLVING BACK AND LEFT BUTTOCK, STAGE 4 (HCC): Status: RESOLVED | Noted: 2021-11-19 | Resolved: 2024-09-26

## 2024-09-26 PROBLEM — E87.6 HYPOKALEMIA: Status: RESOLVED | Noted: 2019-10-29 | Resolved: 2024-09-26

## 2024-09-26 PROBLEM — L89.324 PRESSURE ULCER OF LEFT BUTTOCK, STAGE 4 (HCC): Status: RESOLVED | Noted: 2023-01-10 | Resolved: 2024-09-26

## 2024-09-26 PROBLEM — L89.302 STAGE II PRESSURE ULCER OF BUTTOCK (HCC): Status: RESOLVED | Noted: 2019-09-17 | Resolved: 2024-09-26

## 2024-09-26 PROBLEM — E87.5 HYPERKALEMIA: Status: RESOLVED | Noted: 2019-09-14 | Resolved: 2024-09-26

## 2024-09-26 LAB
BASOPHILS # BLD AUTO: 0.06 THOUSANDS/ΜL (ref 0–0.1)
BASOPHILS NFR BLD AUTO: 1 % (ref 0–1)
EOSINOPHIL # BLD AUTO: 0.2 THOUSAND/ΜL (ref 0–0.61)
EOSINOPHIL NFR BLD AUTO: 4 % (ref 0–6)
ERYTHROCYTE [DISTWIDTH] IN BLOOD BY AUTOMATED COUNT: 20.1 % (ref 11.6–15.1)
FERRITIN SERPL-MCNC: 12 NG/ML (ref 24–336)
HCT VFR BLD AUTO: 36 % (ref 36.5–49.3)
HGB BLD-MCNC: 10.1 G/DL (ref 12–17)
IMM GRANULOCYTES # BLD AUTO: 0.01 THOUSAND/UL (ref 0–0.2)
IMM GRANULOCYTES NFR BLD AUTO: 0 % (ref 0–2)
IRON SATN MFR SERPL: 4 % (ref 15–50)
IRON SERPL-MCNC: 13 UG/DL (ref 50–212)
LYMPHOCYTES # BLD AUTO: 1.56 THOUSANDS/ΜL (ref 0.6–4.47)
LYMPHOCYTES NFR BLD AUTO: 30 % (ref 14–44)
MCH RBC QN AUTO: 21.1 PG (ref 26.8–34.3)
MCHC RBC AUTO-ENTMCNC: 28.1 G/DL (ref 31.4–37.4)
MCV RBC AUTO: 75 FL (ref 82–98)
MONOCYTES # BLD AUTO: 0.41 THOUSAND/ΜL (ref 0.17–1.22)
MONOCYTES NFR BLD AUTO: 8 % (ref 4–12)
NEUTROPHILS # BLD AUTO: 3.04 THOUSANDS/ΜL (ref 1.85–7.62)
NEUTS SEG NFR BLD AUTO: 57 % (ref 43–75)
NRBC BLD AUTO-RTO: 0 /100 WBCS
PLATELET # BLD AUTO: 466 THOUSANDS/UL (ref 149–390)
PMV BLD AUTO: 10.5 FL (ref 8.9–12.7)
PSA FREE MFR SERPL: 7.15 %
PSA FREE SERPL-MCNC: 0.27 NG/ML
PSA SERPL-MCNC: 3.83 NG/ML (ref 0–4)
RBC # BLD AUTO: 4.79 MILLION/UL (ref 3.88–5.62)
SL AMB POCT HEMOGLOBIN AIC: 5.3 (ref ?–6.5)
TIBC SERPL-MCNC: 290 UG/DL (ref 250–450)
UIBC SERPL-MCNC: 277 UG/DL (ref 155–355)
WBC # BLD AUTO: 5.28 THOUSAND/UL (ref 4.31–10.16)

## 2024-09-26 PROCEDURE — 83036 HEMOGLOBIN GLYCOSYLATED A1C: CPT | Performed by: NURSE PRACTITIONER

## 2024-09-26 PROCEDURE — 3078F DIAST BP <80 MM HG: CPT | Performed by: NURSE PRACTITIONER

## 2024-09-26 PROCEDURE — 3074F SYST BP LT 130 MM HG: CPT | Performed by: NURSE PRACTITIONER

## 2024-09-26 PROCEDURE — 83540 ASSAY OF IRON: CPT

## 2024-09-26 PROCEDURE — 85025 COMPLETE CBC W/AUTO DIFF WBC: CPT

## 2024-09-26 PROCEDURE — 84154 ASSAY OF PSA FREE: CPT

## 2024-09-26 PROCEDURE — 84153 ASSAY OF PSA TOTAL: CPT

## 2024-09-26 PROCEDURE — G0008 ADMIN INFLUENZA VIRUS VAC: HCPCS | Performed by: NURSE PRACTITIONER

## 2024-09-26 PROCEDURE — 90673 RIV3 VACCINE NO PRESERV IM: CPT | Performed by: NURSE PRACTITIONER

## 2024-09-26 PROCEDURE — 82728 ASSAY OF FERRITIN: CPT

## 2024-09-26 PROCEDURE — 99214 OFFICE O/P EST MOD 30 MIN: CPT | Performed by: NURSE PRACTITIONER

## 2024-09-26 PROCEDURE — 36415 COLL VENOUS BLD VENIPUNCTURE: CPT

## 2024-09-26 PROCEDURE — 83550 IRON BINDING TEST: CPT

## 2024-09-26 NOTE — ASSESSMENT & PLAN NOTE
Lab Results   Component Value Date    WBC 4.49 05/17/2024    HGB 9.4 (L) 05/17/2024    HCT 34.0 (L) 05/17/2024    MCV 73 (L) 05/17/2024     (H) 05/17/2024     Lab Results   Component Value Date    IRON 29 (L) 06/28/2024    TIBC 387 06/28/2024    FERRITIN 10 (L) 06/28/2024     Recommend repeat PSA, he is due for colonoscopy   Orders:    Iron Panel (Includes Ferritin, Iron Sat%, Iron, and TIBC); Future    CBC and differential; Future

## 2024-09-26 NOTE — ASSESSMENT & PLAN NOTE
Lab Results   Component Value Date    HGBA1C 5.3 09/26/2024     Well controlled, diet controlled     Orders:    POCT hemoglobin A1c

## 2024-09-26 NOTE — ASSESSMENT & PLAN NOTE
Currently prescribed oxycodone 15 mg Q 6 hours (90 MME/ day), patient is requesting to increase back to previous dose ( 20 mg q 4 hours) reviewed why this is not recommended or indicated   Discussed alternative treatment plans   Continue with oxycodone at current dose, next appointment plan to decrease further ~10%

## 2024-09-26 NOTE — TELEPHONE ENCOUNTER
Rec'd call from patient requesting to schedule a surgery or consult to get a wound on his back flattened. Patient stated that he had surgery on 1/25/24 that Dr. Bell assisted with and he was requesting to schedule for something similar to this.    Please contact patient if we are able to schedule him for consult/surgery. Thank you.

## 2024-09-26 NOTE — ASSESSMENT & PLAN NOTE
Lab Results   Component Value Date    PSA 4.350 (H) 06/28/2024    PSA 4.5 (H) 03/02/2023    PSA 3.2 03/10/2022       Orders:    PSA, total and free; Future

## 2024-09-27 ENCOUNTER — TELEPHONE (OUTPATIENT)
Dept: PLASTIC SURGERY | Facility: CLINIC | Age: 64
End: 2024-09-27

## 2024-09-27 ENCOUNTER — HOME CARE VISIT (OUTPATIENT)
Dept: HOME HEALTH SERVICES | Facility: HOME HEALTHCARE | Age: 64
End: 2024-09-27
Payer: MEDICARE

## 2024-09-27 VITALS
RESPIRATION RATE: 18 BRPM | DIASTOLIC BLOOD PRESSURE: 60 MMHG | TEMPERATURE: 97.3 F | OXYGEN SATURATION: 97 % | SYSTOLIC BLOOD PRESSURE: 120 MMHG | HEART RATE: 78 BPM

## 2024-09-27 LAB
CREAT UR-MCNC: 64.3 MG/DL
MICROALBUMIN UR-MCNC: 54 MG/L
MICROALBUMIN/CREAT 24H UR: 84 MG/G CREATININE (ref 0–30)

## 2024-09-27 PROCEDURE — G0299 HHS/HOSPICE OF RN EA 15 MIN: HCPCS

## 2024-09-30 ENCOUNTER — HOME CARE VISIT (OUTPATIENT)
Dept: HOME HEALTH SERVICES | Facility: HOME HEALTHCARE | Age: 64
End: 2024-09-30
Payer: MEDICARE

## 2024-09-30 PROCEDURE — 400014 VN F/U

## 2024-09-30 PROCEDURE — G0299 HHS/HOSPICE OF RN EA 15 MIN: HCPCS

## 2024-09-30 RX ORDER — ACETAMINOPHEN 325 MG/1
650 TABLET ORAL EVERY 6 HOURS PRN
Qty: 40 TABLET | Refills: 0 | Status: SHIPPED | OUTPATIENT
Start: 2024-09-30

## 2024-09-30 RX ORDER — ASPIRIN 81 MG/1
81 TABLET ORAL DAILY
Qty: 90 TABLET | Refills: 0 | Status: SHIPPED | OUTPATIENT
Start: 2024-09-30

## 2024-09-30 RX ORDER — IBUPROFEN 800 MG/1
800 TABLET, FILM COATED ORAL EVERY 8 HOURS PRN
Qty: 60 TABLET | Refills: 0 | Status: SHIPPED | OUTPATIENT
Start: 2024-09-30

## 2024-09-30 RX ORDER — OXYCODONE HYDROCHLORIDE 15 MG/1
15 TABLET ORAL EVERY 6 HOURS PRN
Qty: 120 TABLET | Refills: 0 | Status: SHIPPED | OUTPATIENT
Start: 2024-09-30

## 2024-10-01 VITALS
OXYGEN SATURATION: 99 % | HEART RATE: 78 BPM | DIASTOLIC BLOOD PRESSURE: 60 MMHG | RESPIRATION RATE: 18 BRPM | SYSTOLIC BLOOD PRESSURE: 120 MMHG | TEMPERATURE: 97.3 F

## 2024-10-02 ENCOUNTER — HOME CARE VISIT (OUTPATIENT)
Dept: HOME HEALTH SERVICES | Facility: HOME HEALTHCARE | Age: 64
End: 2024-10-02
Payer: MEDICARE

## 2024-10-02 VITALS
DIASTOLIC BLOOD PRESSURE: 60 MMHG | SYSTOLIC BLOOD PRESSURE: 120 MMHG | TEMPERATURE: 97.6 F | HEART RATE: 78 BPM | OXYGEN SATURATION: 99 % | RESPIRATION RATE: 18 BRPM

## 2024-10-02 PROCEDURE — G0299 HHS/HOSPICE OF RN EA 15 MIN: HCPCS

## 2024-10-04 ENCOUNTER — HOME CARE VISIT (OUTPATIENT)
Dept: HOME HEALTH SERVICES | Facility: HOME HEALTHCARE | Age: 64
End: 2024-10-04
Payer: MEDICARE

## 2024-10-04 PROCEDURE — G0299 HHS/HOSPICE OF RN EA 15 MIN: HCPCS

## 2024-10-06 VITALS
SYSTOLIC BLOOD PRESSURE: 120 MMHG | OXYGEN SATURATION: 97 % | TEMPERATURE: 97.1 F | HEART RATE: 72 BPM | RESPIRATION RATE: 18 BRPM | DIASTOLIC BLOOD PRESSURE: 60 MMHG

## 2024-10-06 VITALS
TEMPERATURE: 97.4 F | RESPIRATION RATE: 20 BRPM | SYSTOLIC BLOOD PRESSURE: 124 MMHG | HEART RATE: 68 BPM | OXYGEN SATURATION: 98 % | DIASTOLIC BLOOD PRESSURE: 70 MMHG

## 2024-10-07 ENCOUNTER — TELEPHONE (OUTPATIENT)
Age: 64
End: 2024-10-07

## 2024-10-07 ENCOUNTER — HOME CARE VISIT (OUTPATIENT)
Dept: HOME HEALTH SERVICES | Facility: HOME HEALTHCARE | Age: 64
End: 2024-10-07
Payer: MEDICARE

## 2024-10-07 VITALS — RESPIRATION RATE: 16 BRPM

## 2024-10-07 PROCEDURE — G0299 HHS/HOSPICE OF RN EA 15 MIN: HCPCS

## 2024-10-07 NOTE — TELEPHONE ENCOUNTER
Dr. García wants to see patient in the office as last office visit was almost 2 years ago and was also a virtual. Patient was called and r/s appt. Patient aware that appt needs to be in person.

## 2024-10-07 NOTE — TELEPHONE ENCOUNTER
PT called and stated transportation will not be able to bring him to the appt this morning. Please review if pt can have a VV if not pt will need to r/s appt     PT call back-[phone]

## 2024-10-09 ENCOUNTER — HOME CARE VISIT (OUTPATIENT)
Dept: HOME HEALTH SERVICES | Facility: HOME HEALTHCARE | Age: 64
End: 2024-10-09
Payer: MEDICARE

## 2024-10-09 VITALS
SYSTOLIC BLOOD PRESSURE: 110 MMHG | HEART RATE: 66 BPM | OXYGEN SATURATION: 98 % | RESPIRATION RATE: 16 BRPM | TEMPERATURE: 98.8 F | DIASTOLIC BLOOD PRESSURE: 60 MMHG

## 2024-10-09 PROCEDURE — G0300 HHS/HOSPICE OF LPN EA 15 MIN: HCPCS

## 2024-10-10 DIAGNOSIS — D50.9 MICROCYTIC ANEMIA: Primary | ICD-10-CM

## 2024-10-11 ENCOUNTER — HOME CARE VISIT (OUTPATIENT)
Dept: HOME HEALTH SERVICES | Facility: HOME HEALTHCARE | Age: 64
End: 2024-10-11
Payer: MEDICARE

## 2024-10-11 ENCOUNTER — E-CONSULT (OUTPATIENT)
Dept: HEMATOLOGY ONCOLOGY | Facility: CLINIC | Age: 64
End: 2024-10-11
Payer: MEDICARE

## 2024-10-11 VITALS
SYSTOLIC BLOOD PRESSURE: 128 MMHG | RESPIRATION RATE: 20 BRPM | DIASTOLIC BLOOD PRESSURE: 78 MMHG | OXYGEN SATURATION: 99 % | HEART RATE: 80 BPM

## 2024-10-11 DIAGNOSIS — D50.9 IRON DEFICIENCY ANEMIA, UNSPECIFIED IRON DEFICIENCY ANEMIA TYPE: Primary | ICD-10-CM

## 2024-10-11 PROCEDURE — G0299 HHS/HOSPICE OF RN EA 15 MIN: HCPCS

## 2024-10-11 PROCEDURE — 99446 NTRPROF PH1/NTRNET/EHR 5-10: CPT | Performed by: PHYSICIAN ASSISTANT

## 2024-10-11 NOTE — PROGRESS NOTES
E-Consult  Rikki Farooqcat 64 y.o. male MRN: [id number]  Encounter Date: 10/11/24      Reason for Consult / Principal Problem: chronic iron def anemia     Consulting Provider: Guillermina Brown PA-C    Requesting Provider: Leigh Ann Toure, *       ASSESSMENT:      RECOMMENDATIONS:  Iron deficiency is not a primary hematological process.  Causes of iron deficiency to include:   Blood loss (GI bleed, frequent blood donation, frequent epistaxis)  Malabsorption (Karen-en Y or sleeve gastrectomy, active or recent H. pylori, chronic prolonged PPI use)  Insufficient dietary intake      Pursue work up for cause of iron def anemia.     Recommend IV iron loading dose, Venofer 200 mg IV weekly x 5.     Total time spent 5-10 minutes, >50% of the total time devoted to medical consultative verbal/EMR discussion between providers. Written report will be generated in the EMR. .

## 2024-10-14 ENCOUNTER — HOME CARE VISIT (OUTPATIENT)
Dept: HOME HEALTH SERVICES | Facility: HOME HEALTHCARE | Age: 64
End: 2024-10-14
Payer: MEDICARE

## 2024-10-14 VITALS
TEMPERATURE: 98.9 F | RESPIRATION RATE: 20 BRPM | HEART RATE: 84 BPM | SYSTOLIC BLOOD PRESSURE: 106 MMHG | DIASTOLIC BLOOD PRESSURE: 64 MMHG | OXYGEN SATURATION: 95 %

## 2024-10-14 PROCEDURE — G0299 HHS/HOSPICE OF RN EA 15 MIN: HCPCS

## 2024-10-16 ENCOUNTER — HOME CARE VISIT (OUTPATIENT)
Dept: HOME HEALTH SERVICES | Facility: HOME HEALTHCARE | Age: 64
End: 2024-10-16
Payer: MEDICARE

## 2024-10-16 ENCOUNTER — TELEPHONE (OUTPATIENT)
Dept: FAMILY MEDICINE CLINIC | Facility: CLINIC | Age: 64
End: 2024-10-16

## 2024-10-16 VITALS
HEART RATE: 66 BPM | SYSTOLIC BLOOD PRESSURE: 110 MMHG | TEMPERATURE: 98.1 F | RESPIRATION RATE: 16 BRPM | DIASTOLIC BLOOD PRESSURE: 60 MMHG | OXYGEN SATURATION: 100 %

## 2024-10-16 PROCEDURE — G0300 HHS/HOSPICE OF LPN EA 15 MIN: HCPCS

## 2024-10-16 NOTE — TELEPHONE ENCOUNTER
----- Message from KALPANA Cardoza sent at 10/16/2024  3:58 PM EDT -----  Please schedule patient for labs review, can be virtual if he is ok without

## 2024-10-18 ENCOUNTER — HOME CARE VISIT (OUTPATIENT)
Dept: HOME HEALTH SERVICES | Facility: HOME HEALTHCARE | Age: 64
End: 2024-10-18
Payer: MEDICARE

## 2024-10-18 ENCOUNTER — OFFICE VISIT (OUTPATIENT)
Dept: WOUND CARE | Facility: CLINIC | Age: 64
End: 2024-10-18
Payer: MEDICARE

## 2024-10-18 ENCOUNTER — TELEPHONE (OUTPATIENT)
Dept: FAMILY MEDICINE CLINIC | Facility: CLINIC | Age: 64
End: 2024-10-18

## 2024-10-18 VITALS
DIASTOLIC BLOOD PRESSURE: 78 MMHG | HEART RATE: 76 BPM | TEMPERATURE: 97 F | SYSTOLIC BLOOD PRESSURE: 106 MMHG | RESPIRATION RATE: 16 BRPM

## 2024-10-18 DIAGNOSIS — G82.20 PARAPLEGIC SPINAL PARALYSIS (HCC): ICD-10-CM

## 2024-10-18 DIAGNOSIS — L89.154 STAGE IV PRESSURE ULCER OF SACRAL REGION (HCC): Primary | ICD-10-CM

## 2024-10-18 PROCEDURE — 11042 DBRDMT SUBQ TIS 1ST 20SQCM/<: CPT | Performed by: SURGERY

## 2024-10-18 PROCEDURE — 11045 DBRDMT SUBQ TISS EACH ADDL: CPT | Performed by: SURGERY

## 2024-10-18 RX ORDER — LIDOCAINE HYDROCHLORIDE 40 MG/ML
5 SOLUTION TOPICAL ONCE
Status: COMPLETED | OUTPATIENT
Start: 2024-10-18 | End: 2024-10-18

## 2024-10-18 RX ADMIN — LIDOCAINE HYDROCHLORIDE 5 ML: 40 SOLUTION TOPICAL at 10:58

## 2024-10-18 NOTE — PATIENT INSTRUCTIONS
Orders Placed This Encounter   Procedures    Wound cleansing and dressings Pressure Injury Sacrum     Wash your hands with soap and water.    Remove old dressing, discard into plastic bag and place in trash.    Cleanse the wound with Mild Soap & Water prior to applying a clean dressing.   Do not use tissue or cotton balls. Do not scrub the wound. Pat dry using gauze.     Sacral wound:  Cleanse wounds with normal saline. Pat dry   Skin prep to gaby-wound  Apply Calcium Alginate to all wound surfaces and under skin bridge followed by fluffed 4 x 4's to keep alginate in place.   Cover with ABD's.   Change dressings daily and as needed for excessive drainage         Keep pressure off of all wounds as much as possible, limit sitting in chair as much as possible       Continue VNA three x per week (wife will do in between) for dressing changes, except on the day the patient goes to the wound center.      OFF-LOADING   Avoid pressure at wound site. - all wounds, including right back   Wheelchair Cushion. - ROHO cushion      Do Not Sit for Long Periods of Time. - 1 hr max for meals only, otherwise in bed and turn side to side at least   every 3 hours or more frequently         Reposition at least every 1-2 hours while awake.       Follow up in 4 weeks with Dr. Yan.     Standing Status:   Future     Standing Expiration Date:   10/25/2024    \[id number]\

## 2024-10-18 NOTE — PROGRESS NOTES
"Patient ID: Rikki Arguello is a 64 y.o. male Date of Birth 1960     Chief Complaint  Chief Complaint   Patient presents with    Follow Up Wound Care Visit     Left hip and sacral wound       Allergies  Patient has no known allergies.    Assessment:    Stage IV pressure ulcer of sacral region (HCC)  The wound was debrided with a curette.  All the bone is still covered.  Still similar size and undermining.                   Diagnoses and all orders for this visit:    Stage IV pressure ulcer of sacral region (HCC)  -     Wound cleansing and dressings Pressure Injury Sacrum; Future  -     lidocaine (XYLOCAINE) 4 % topical solution 5 mL  -     Wound Procedure Treatment Pressure Injury Sacrum    Paraplegic spinal paralysis (HCC)    Other orders  -     Debridement Pressure Injury Sacrum                Debridement    Universal Protocol:  Consent: Verbal consent obtained.  Consent given by: patient  Time out: Immediately prior to procedure a \"time out\" was called to verify the correct patient, procedure, equipment, support staff and site/side marked as required.    Debridement Details  Performed by: physician  Debridement type: surgical  Level of debridement: subcutaneous tissue  Pain control: lidocaine 4%      Post-debridement measurements  Length (cm): 7  Width (cm): 8.4  Depth (cm): 2.3  Percent debrided: 50%  Surface Area (cm^2): 58.8  Area Debrided (cm^2): 29.4  Volume (cm^3): 135.24    Tissue and other material debrided: subcutaneous tissue  Devitalized tissue debrided: biofilm and slough  Instrument(s) utilized: curette  Procedural pain (0-10): insensate  Post-procedural pain: insensate   Response to treatment: procedure was tolerated well        Plan:     Wound 04/19/24 Pressure Injury Sacrum (Active)   Wound Image Images linked 10/18/24 1055   Wound Description Pink;Hypergranulation;Yellow 10/18/24 1018   Pressure Injury Stage 4 10/18/24 1018   Shelby-wound Assessment Intact;Maceration 10/18/24 1018   Wound " Length (cm) 7 cm 10/18/24 1018   Wound Width (cm) 8.4 cm 10/18/24 1018   Wound Depth (cm) 2.3 cm 10/18/24 1018   Wound Surface Area (cm^2) 58.8 cm^2 10/18/24 1018   Wound Volume (cm^3) 135.24 cm^3 10/18/24 1018   Calculated Wound Volume (cm^3) 135.24 cm^3 10/18/24 1018   Change in Wound Size % -103.37 10/18/24 1018   Tunneling 1 3 cm 10/18/24 1018   Tunneling 1 in depth located at 1:00 10/18/24 1018   Undermining 1 3.5 10/18/24 1018   Undermining 1 is depth extending from 8:00-11:00 10/18/24 1018   Drainage Amount Large 10/18/24 1018   Drainage Description Serosanguineous 10/18/24 1018   Non-staged Wound Description Full thickness 10/18/24 1018   Treatments Irrigation with NSS 10/18/24 1018       Wound 04/19/24 Pressure Injury Sacrum (Active)   Date First Assessed: 04/19/24   Primary Wound Type: Pressure Injury  Location: Sacrum  Wound Outcome: Palliative       Wound 09/30/24 Skin Tear Abrasion(s) Hip Left (Active)   Date First Assessed: 09/30/24   Primary Wound Type: Skin Tear  Traumatic Wound Type: Abrasion(s)  Location: Hip  Wound Location Orientation: Left  Wound Description (Comments): pink gran pc of skin peeled back   Incision's 1st Dressing: cleansed with ...       [REMOVED] Wound 08/26/19 Buttocks Left;Distal;Lateral (Removed)   Resolved Date/Resolved Time: 08/26/20 1056  Date First Assessed/Time First Assessed: 08/26/19 1130   Pre-Existing Wound: Yes  Location: Buttocks  Wound Location Orientation: Left;Distal;Lateral  Wound Description (Comments): Deep ischial wound       [REMOVED] Wound 09/16/19 Buttocks Right;Distal (Removed)   Resolved Date/Resolved Time: 08/26/20 1055  Date First Assessed/Time First Assessed: 09/16/19 1657   Pre-Existing Wound: Yes  Location: Buttocks  Wound Location Orientation: Right;Distal       [REMOVED] Wound 10/28/19 Knee Left (Removed)   Resolved Date/Resolved Time: 08/26/20 1055  Date First Assessed/Time First Assessed: 10/28/19 0657   Pre-Existing Wound: Yes  Location: Knee   Wound Location Orientation: Left  Wound Description (Comments): round black  Dressing Status: Open to air       [REMOVED] Wound 10/28/19 Buttocks Left;Mid (Removed)   Resolved Date/Resolved Time: 08/26/20 1056  Date First Assessed/Time First Assessed: 10/28/19 1108   Pre-Existing Wound: Yes  Location: Buttocks  Wound Location Orientation: Left;Mid  Wound Description (Comments): Stage 2 vs traumatic injury       [REMOVED] Wound 11/01/19 Other (Comment) N/A (Removed)   Resolved Date/Resolved Time: 08/26/20 1055  Date First Assessed/Time First Assessed: 11/01/19 1640   Location: Other (Comment)  Wound Location Orientation: N/A  Wound Description (Comments): scrotum dressed with exofin       [REMOVED] Wound 08/26/20 Pressure Injury Buttocks Left (Removed)   Resolved Date: 04/19/24  Date First Assessed/Time First Assessed: 08/26/20 1056   Primary Wound Type: Pressure Injury  Location: Buttocks  Wound Location Orientation: Left  Wound Outcome: (c) Converged       [REMOVED] Wound 08/26/20 Pressure Injury Hip Left (Removed)   Resolved Date: 10/04/23  Date First Assessed/Time First Assessed: 08/26/20 1057   Primary Wound Type: Pressure Injury  Location: Hip  Wound Location Orientation: Left  Wound Outcome: Palliative       [REMOVED] Wound 07/29/21 Pressure Injury Back Right;Lower;Lateral (Removed)   Resolved Date/Resolved Time: 01/23/24 1934  Date First Assessed: 07/29/21   Primary Wound Type: Pressure Injury  Location: Back  Wound Location Orientation: Right;Lower;Lateral  Wound Outcome: Healed       [REMOVED] Wound 11/05/21 Pressure Injury Sacrum (Removed)   Resolved Date: 04/19/24  Date First Assessed/Time First Assessed: 11/05/21 1059   Present on Original Admission: Yes  Primary Wound Type: Pressure Injury  Location: Sacrum  Wound Outcome: (c) Palliative       [REMOVED] Wound 05/27/22 Skin Tear Buttocks Left;Proximal (Removed)   Resolved Date/Resolved Time: 08/05/22 0851  Date First Assessed/Time First Assessed:  05/27/22 0946   Primary Wound Type: Skin Tear  Location: Buttocks  Wound Location Orientation: Left;Proximal  Wound Outcome: Healed       [REMOVED] Wound 03/17/23 Pressure Injury Hip Lateral;Left;Proximal (Removed)   Resolved Date: 08/21/23  Date First Assessed/Time First Assessed: 03/17/23 1039   Primary Wound Type: Pressure Injury  Location: Hip  Wound Location Orientation: Lateral;Left;Proximal  Wound Outcome: Healed       [REMOVED] Wound 06/19/23 Pressure Injury Hip Left;Medial (Removed)   Resolved Date: 09/18/23  Date First Assessed: 06/19/23   Present on Original Admission: No  Primary Wound Type: Pressure Injury  Location: Hip  Wound Location Orientation: Left;Medial  Wound Description (Comments): granulation  yellow serous drainage ...       [REMOVED] Wound 10/13/23 Pressure Injury Finger (Comment which one) Right (Removed)   Resolved Date: 11/20/23  Date First Assessed/Time First Assessed: 10/13/23 1035   Primary Wound Type: Pressure Injury  Location: Finger (Comment which one)  Wound Location Orientation: Right  Wound Outcome: Healed       [REMOVED] Wound 01/17/24 Pressure Injury Flank Right;Upper (Removed)   Resolved Date: 02/02/24  Date First Assessed/Time First Assessed: 01/17/24 1926   Present on Original Admission: Yes  Primary Wound Type: Pressure Injury  Location: Flank  Wound Location Orientation: Right;Upper       [REMOVED] Wound 01/17/24 Knee Anterior;Left (Removed)   Resolved Date: 02/02/24  Date First Assessed/Time First Assessed: 01/17/24 1930   Location: Knee  Wound Location Orientation: Anterior;Left       [REMOVED] Wound 01/18/24 Pressure Injury Hip Left (Removed)   Resolved Date: 08/09/24  Date First Assessed/Time First Assessed: 01/18/24 1137   Primary Wound Type: Pressure Injury  Location: Hip  Wound Location Orientation: Left  Wound Outcome: Healed       [REMOVED] Wound 01/25/24 Chest Right (Removed)   Resolved Date: 03/15/24  Date First Assessed/Time First Assessed: 01/25/24 1205    Location: Chest  Wound Location Orientation: Right  Wound Description (Comments): addy [id number] negative pressure wound therapy system  Incision's 1st Dressing: DRESSING ...       Subjective:      .    Mr. Arguello is a 61-year-old gentleman with paraplegia and history of multiple pressure wounds with 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.  He had been rescheduled for a debridement and flap closure by plastics in August but developed a new wound on his right mid to lower back chest wall.  This wound probes deep and has been closed on the outside but the tract has not been improving.  He had a CT scan that shows a deep tracking to the muscle but no fistulization to the internal thoracic cavity.    02/04/2022 he returns now for re-evaluation.  He still has had visiting nurses but has not been seen in the Wound Center since November.  He denies any change or complaint    03/11/2022 no changes since been seen last.  No new complaints.    04/22/2022 no issues.  No changes.    05/27/2022.  Doing well.  Denies fevers or chills.  No issues spoke about plastics a re-evaluation but he wants to wait for COVID to wane more and maybe next fall    07/22/2022.  He has not been seen here since May mostly due to transportation issues.  He has significant more pain and drainage on his right back chest wall wound.  Otherwise no changes    08/05/2022 denies fevers or chills.  Still in pain on the right side.    3/17/2023 he returns for evaluation.  He was seen in the wound center by another provider month or 2 ago.    6/9/2023.  Here for long-term follow-up.  No significant changes.    7/14/2023.  No changes.  Doing well.    9/22/2023 he had an outpatient CT scan.  The right chest wall wound has increasing drainage.  The dressings from the sacral area have some greenish color    10/13/2023.  He did receive a VAC.  He still has greenish drainage on the dressings    10/27/2023 no significant changes.  He states he did get his  hospital mattress.  Tolerating the VAC well.    12/8/2023.  He has had significant creasing drainage from the VAC dressing from the right back chest wall wound.  Difficulty maintaining enough canisters for the VAC machine.    12/22/2023 he denies any change.  Denies any fevers or chills.  Denies any issues.    4/19/2024 he returns now for resumption of care.  Since being seen last he did get admitted to [facility] and underwent extensive debridement of the chest wall wound rib resection with latissimus dorsi flap closure.  That wound did very well and the wound is healed.  He is here for evaluation of his chronic sacral and hip wounds.    5/31/2024.  Overall is doing well.  He was seen by plastics and his right side is healing well.  He is complain of more pain today from the sacrum.  Denies fevers or chills.  Visit nurses and his wife are doing dressing changes      6/20/2024.  Note complaints or changes since seen last.    8/9/2024 no issues.  No changes.  Still the same dressing changes.    9/20/2024.  He reports no changes.  Doing well.  No new wounds.    10/18/2024 still doing very well.  Doing dressing changes without difficulty.  No new issues.            The following portions of the patient's history were reviewed and updated as appropriate: allergies, current medications, past family history, past medical history, past social history, past surgical history and problem list.    Review of Systems   Constitutional:  Negative for chills and fever.   HENT:  Negative for ear pain and sore throat.    Eyes:  Negative for pain and visual disturbance.   Respiratory:  Negative for cough and shortness of breath.    Cardiovascular:  Negative for chest pain.   Gastrointestinal:  Negative for abdominal pain and vomiting.   Skin:  Positive for wound. Negative for color change.   Neurological:  Negative for seizures and syncope.   Psychiatric/Behavioral:  Negative for agitation and behavioral problems.    All  other systems reviewed and are negative.        Objective:       Wound 04/19/24 Pressure Injury Sacrum (Active)   Wound Image Images linked 10/18/24 1055   Wound Description Pink;Hypergranulation;Yellow 10/18/24 1018   Pressure Injury Stage 4 10/18/24 1018   Shelby-wound Assessment Intact;Maceration 10/18/24 1018   Wound Length (cm) 7 cm 10/18/24 1018   Wound Width (cm) 8.4 cm 10/18/24 1018   Wound Depth (cm) 2.3 cm 10/18/24 1018   Wound Surface Area (cm^2) 58.8 cm^2 10/18/24 1018   Wound Volume (cm^3) 135.24 cm^3 10/18/24 1018   Calculated Wound Volume (cm^3) 135.24 cm^3 10/18/24 1018   Change in Wound Size % -103.37 10/18/24 1018   Tunneling 1 3 cm 10/18/24 1018   Tunneling 1 in depth located at 1:00 10/18/24 1018   Undermining 1 3.5 10/18/24 1018   Undermining 1 is depth extending from 8:00-11:00 10/18/24 1018   Drainage Amount Large 10/18/24 1018   Drainage Description Serosanguineous 10/18/24 1018   Non-staged Wound Description Full thickness 10/18/24 1018   Treatments Irrigation with NSS 10/18/24 1018       /78   Pulse 76   Temp (!) 97 °F (36.1 °C)   Resp 16     Physical Exam  Vitals and nursing note reviewed. Exam conducted with a chaperone present.   Constitutional:       Appearance: Normal appearance.   Cardiovascular:      Rate and Rhythm: Normal rate and regular rhythm.   Pulmonary:      Effort: Pulmonary effort is normal.      Breath sounds: Normal breath sounds.   Abdominal:      General: There is no distension.      Palpations: Abdomen is soft. There is no mass.      Tenderness: There is no abdominal tenderness.      Comments: Ostomy   Neurological:      Mental Status: He is alert.   Psychiatric:         Mood and Affect: Mood normal.         Behavior: Behavior normal.           Wound Instructions:  Orders Placed This Encounter   Procedures    Wound cleansing and dressings Pressure Injury Sacrum     Wash your hands with soap and water.    Remove old dressing, discard into plastic bag and place in  trash.    Cleanse the wound with Mild Soap & Water prior to applying a clean dressing.   Do not use tissue or cotton balls. Do not scrub the wound. Pat dry using gauze.     Sacral wound:  Cleanse wounds with normal saline. Pat dry   Skin prep to gaby-wound  Apply Calcium Alginate to all wound surfaces and under skin bridge followed by fluffed 4 x 4's to keep alginate in place.   Cover with ABD's.   Change dressings daily and as needed for excessive drainage         Keep pressure off of all wounds as much as possible, limit sitting in chair as much as possible       Continue VNA three x per week (wife will do in between) for dressing changes, except on the day the patient goes to the wound center.      OFF-LOADING   Avoid pressure at wound site. - all wounds, including right back   Wheelchair Cushion. - ROHO cushion      Do Not Sit for Long Periods of Time. - 1 hr max for meals only, otherwise in bed and turn side to side at least   every 3 hours or more frequently         Reposition at least every 1-2 hours while awake.       Follow up in 4 weeks with Dr. Yan.     Standing Status:   Future     Standing Expiration Date:   10/25/2024    Wound Procedure Treatment Pressure Injury Sacrum     This order was created via procedure documentation    Debridement Pressure Injury Sacrum     This order was created via procedure documentation        Diagnosis ICD-10-CM Associated Orders   1. Stage IV pressure ulcer of sacral region ([facility])  L89.154 Wound cleansing and dressings Pressure Injury Sacrum     lidocaine (XYLOCAINE) 4 % topical solution 5 mL     Wound Procedure Treatment Pressure Injury Sacrum      2. Paraplegic spinal paralysis ([facility])  G82.20

## 2024-10-18 NOTE — TELEPHONE ENCOUNTER
Send this request to Rosa Souffront     Utility Company ([employer] or [employer]): [employer]    Shut off date: 10/18/24    Account number: [id number]     Name on Account: RADHA KENDRICK    Relationship to Patient:SELF    Address on Bill: [address] [facility] [address]    Phone number: [phone]

## 2024-10-18 NOTE — TELEPHONE ENCOUNTER
[employer] Medical Certification signed by Leigh Ann Toure and faxed on 10/18/24. Confirmation received and scanned into chart. Left message to inform patient.

## 2024-10-19 NOTE — ASSESSMENT & PLAN NOTE
The wound was debrided with a curette.  All the bone is still covered.  Still similar size and undermining.

## 2024-10-21 ENCOUNTER — HOME CARE VISIT (OUTPATIENT)
Dept: HOME HEALTH SERVICES | Facility: HOME HEALTHCARE | Age: 64
End: 2024-10-21
Payer: MEDICARE

## 2024-10-21 PROCEDURE — G0299 HHS/HOSPICE OF RN EA 15 MIN: HCPCS

## 2024-10-23 ENCOUNTER — HOME CARE VISIT (OUTPATIENT)
Dept: HOME HEALTH SERVICES | Facility: HOME HEALTHCARE | Age: 64
End: 2024-10-23
Payer: MEDICARE

## 2024-10-23 VITALS
OXYGEN SATURATION: 98 % | SYSTOLIC BLOOD PRESSURE: 120 MMHG | HEART RATE: 76 BPM | RESPIRATION RATE: 18 BRPM | DIASTOLIC BLOOD PRESSURE: 60 MMHG | TEMPERATURE: 97.5 F

## 2024-10-23 PROCEDURE — G0299 HHS/HOSPICE OF RN EA 15 MIN: HCPCS

## 2024-10-25 ENCOUNTER — HOME CARE VISIT (OUTPATIENT)
Dept: HOME HEALTH SERVICES | Facility: HOME HEALTHCARE | Age: 64
End: 2024-10-25
Payer: MEDICARE

## 2024-10-25 VITALS
TEMPERATURE: 97.2 F | RESPIRATION RATE: 18 BRPM | DIASTOLIC BLOOD PRESSURE: 60 MMHG | SYSTOLIC BLOOD PRESSURE: 120 MMHG | OXYGEN SATURATION: 97 % | HEART RATE: 84 BPM

## 2024-10-25 PROCEDURE — G0299 HHS/HOSPICE OF RN EA 15 MIN: HCPCS

## 2024-10-28 ENCOUNTER — HOME CARE VISIT (OUTPATIENT)
Dept: HOME HEALTH SERVICES | Facility: HOME HEALTHCARE | Age: 64
End: 2024-10-28
Payer: MEDICARE

## 2024-10-28 DIAGNOSIS — F11.20 CONTINUOUS OPIOID DEPENDENCE (HCC): ICD-10-CM

## 2024-10-28 DIAGNOSIS — G89.29 CHRONIC LOW BACK PAIN WITHOUT SCIATICA, UNSPECIFIED BACK PAIN LATERALITY: ICD-10-CM

## 2024-10-28 DIAGNOSIS — M54.50 CHRONIC LOW BACK PAIN WITHOUT SCIATICA, UNSPECIFIED BACK PAIN LATERALITY: ICD-10-CM

## 2024-10-28 DIAGNOSIS — M86.68 OTHER CHRONIC OSTEOMYELITIS, OTHER SITE (HCC): ICD-10-CM

## 2024-10-28 DIAGNOSIS — K21.9 GASTROESOPHAGEAL REFLUX DISEASE WITHOUT ESOPHAGITIS: ICD-10-CM

## 2024-10-28 PROCEDURE — G0299 HHS/HOSPICE OF RN EA 15 MIN: HCPCS

## 2024-10-29 VITALS
HEART RATE: 78 BPM | OXYGEN SATURATION: 97 % | DIASTOLIC BLOOD PRESSURE: 60 MMHG | SYSTOLIC BLOOD PRESSURE: 110 MMHG | RESPIRATION RATE: 18 BRPM | TEMPERATURE: 97.2 F

## 2024-10-30 ENCOUNTER — HOME CARE VISIT (OUTPATIENT)
Dept: HOME HEALTH SERVICES | Facility: HOME HEALTHCARE | Age: 64
End: 2024-10-30
Payer: MEDICARE

## 2024-10-30 PROCEDURE — G0299 HHS/HOSPICE OF RN EA 15 MIN: HCPCS

## 2024-10-30 RX ORDER — OXYCODONE HYDROCHLORIDE 15 MG/1
15 TABLET ORAL EVERY 6 HOURS PRN
Qty: 120 TABLET | Refills: 0 | Status: SHIPPED | OUTPATIENT
Start: 2024-10-30

## 2024-10-30 RX ORDER — ACETAMINOPHEN 325 MG/1
650 TABLET ORAL EVERY 6 HOURS PRN
Qty: 40 TABLET | Refills: 0 | Status: SHIPPED | OUTPATIENT
Start: 2024-10-30

## 2024-10-30 RX ORDER — IBUPROFEN 800 MG/1
800 TABLET, FILM COATED ORAL EVERY 8 HOURS PRN
Qty: 60 TABLET | Refills: 0 | Status: SHIPPED | OUTPATIENT
Start: 2024-10-30

## 2024-10-30 RX ORDER — ASPIRIN 81 MG/1
81 TABLET ORAL DAILY
Qty: 90 TABLET | Refills: 0 | Status: SHIPPED | OUTPATIENT
Start: 2024-10-30

## 2024-10-31 VITALS
OXYGEN SATURATION: 99 % | TEMPERATURE: 97.2 F | RESPIRATION RATE: 18 BRPM | SYSTOLIC BLOOD PRESSURE: 110 MMHG | DIASTOLIC BLOOD PRESSURE: 60 MMHG | HEART RATE: 71 BPM

## 2024-11-01 ENCOUNTER — HOME CARE VISIT (OUTPATIENT)
Dept: HOME HEALTH SERVICES | Facility: HOME HEALTHCARE | Age: 64
End: 2024-11-01
Payer: MEDICARE

## 2024-11-01 PROCEDURE — G0299 HHS/HOSPICE OF RN EA 15 MIN: HCPCS

## 2024-11-04 ENCOUNTER — HOME CARE VISIT (OUTPATIENT)
Dept: HOME HEALTH SERVICES | Facility: HOME HEALTHCARE | Age: 64
End: 2024-11-04
Payer: MEDICARE

## 2024-11-04 VITALS
RESPIRATION RATE: 18 BRPM | OXYGEN SATURATION: 97 % | TEMPERATURE: 97.3 F | DIASTOLIC BLOOD PRESSURE: 60 MMHG | SYSTOLIC BLOOD PRESSURE: 110 MMHG | HEART RATE: 72 BPM

## 2024-11-04 VITALS
SYSTOLIC BLOOD PRESSURE: 120 MMHG | DIASTOLIC BLOOD PRESSURE: 80 MMHG | HEART RATE: 75 BPM | TEMPERATURE: 97.3 F | RESPIRATION RATE: 18 BRPM | OXYGEN SATURATION: 96 %

## 2024-11-04 PROCEDURE — G0299 HHS/HOSPICE OF RN EA 15 MIN: HCPCS

## 2024-11-06 ENCOUNTER — HOME CARE VISIT (OUTPATIENT)
Dept: HOME HEALTH SERVICES | Facility: HOME HEALTHCARE | Age: 64
End: 2024-11-06
Payer: MEDICARE

## 2024-11-06 PROCEDURE — G0299 HHS/HOSPICE OF RN EA 15 MIN: HCPCS

## 2024-11-08 ENCOUNTER — HOME CARE VISIT (OUTPATIENT)
Dept: HOME HEALTH SERVICES | Facility: HOME HEALTHCARE | Age: 64
End: 2024-11-08
Payer: MEDICARE

## 2024-11-08 PROCEDURE — G0299 HHS/HOSPICE OF RN EA 15 MIN: HCPCS

## 2024-11-10 VITALS
HEART RATE: 81 BPM | OXYGEN SATURATION: 97 % | DIASTOLIC BLOOD PRESSURE: 60 MMHG | SYSTOLIC BLOOD PRESSURE: 120 MMHG | TEMPERATURE: 97.3 F | RESPIRATION RATE: 18 BRPM

## 2024-11-11 ENCOUNTER — HOME CARE VISIT (OUTPATIENT)
Dept: HOME HEALTH SERVICES | Facility: HOME HEALTHCARE | Age: 64
End: 2024-11-11
Payer: MEDICARE

## 2024-11-11 VITALS
TEMPERATURE: 97 F | DIASTOLIC BLOOD PRESSURE: 60 MMHG | SYSTOLIC BLOOD PRESSURE: 110 MMHG | HEART RATE: 74 BPM | OXYGEN SATURATION: 96 % | RESPIRATION RATE: 16 BRPM

## 2024-11-11 PROCEDURE — G0299 HHS/HOSPICE OF RN EA 15 MIN: HCPCS

## 2024-11-12 ENCOUNTER — TELEMEDICINE (OUTPATIENT)
Dept: FAMILY MEDICINE CLINIC | Facility: CLINIC | Age: 64
End: 2024-11-12

## 2024-11-12 DIAGNOSIS — D50.0 IRON DEFICIENCY ANEMIA DUE TO CHRONIC BLOOD LOSS: ICD-10-CM

## 2024-11-12 DIAGNOSIS — I10 BENIGN ESSENTIAL HYPERTENSION: Primary | ICD-10-CM

## 2024-11-12 DIAGNOSIS — R97.20 ELEVATED PROSTATE SPECIFIC ANTIGEN (PSA): ICD-10-CM

## 2024-11-12 DIAGNOSIS — E11.9 TYPE 2 DIABETES MELLITUS WITHOUT COMPLICATION, WITHOUT LONG-TERM CURRENT USE OF INSULIN (HCC): ICD-10-CM

## 2024-11-12 DIAGNOSIS — G82.20 PARAPLEGIC SPINAL PARALYSIS (HCC): ICD-10-CM

## 2024-11-12 DIAGNOSIS — R19.5 HEME POSITIVE STOOL: ICD-10-CM

## 2024-11-12 DIAGNOSIS — R80.9 MICROALBUMINURIA: ICD-10-CM

## 2024-11-12 DIAGNOSIS — D64.9 ANEMIA, UNSPECIFIED TYPE: ICD-10-CM

## 2024-11-12 PROCEDURE — G2211 COMPLEX E/M VISIT ADD ON: HCPCS | Performed by: NURSE PRACTITIONER

## 2024-11-12 PROCEDURE — 3078F DIAST BP <80 MM HG: CPT | Performed by: NURSE PRACTITIONER

## 2024-11-12 PROCEDURE — 3074F SYST BP LT 130 MM HG: CPT | Performed by: NURSE PRACTITIONER

## 2024-11-12 PROCEDURE — 99213 OFFICE O/P EST LOW 20 MIN: CPT | Performed by: NURSE PRACTITIONER

## 2024-11-12 RX ORDER — LISINOPRIL 2.5 MG/1
2.5 TABLET ORAL DAILY
Qty: 30 TABLET | Refills: 5 | Status: SHIPPED | OUTPATIENT
Start: 2024-11-12

## 2024-11-12 RX ORDER — SODIUM CHLORIDE 9 MG/ML
20 INJECTION, SOLUTION INTRAVENOUS ONCE
Status: CANCELLED | OUTPATIENT
Start: 2024-11-26

## 2024-11-12 NOTE — ASSESSMENT & PLAN NOTE
Lab Results   Component Value Date    WBC 5.28 09/26/2024    HGB 10.1 (L) 09/26/2024    HCT 36.0 (L) 09/26/2024    MCV 75 (L) 09/26/2024     (H) 09/26/2024     Lab Results   Component Value Date    IRON 13 (L) 09/26/2024    TIBC 290 09/26/2024    FERRITIN 12 (L) 09/26/2024     he is due for colonoscopy, referral placed  Also recommend follow up with colorectal surgery   Will also have patient complete Venofer infusions x 5 as per hematology recommends, referral team to assist with scheduling

## 2024-11-12 NOTE — ASSESSMENT & PLAN NOTE
Lab Results   Component Value Date    HGBA1C 5.3 09/26/2024     Diet controlled  Not on statin, starting ACE   Eye exam UTD  Hx of amputation R,     Orders:    lisinopril (ZESTRIL) 2.5 mg tablet; Take 1 tablet (2.5 mg total) by mouth daily

## 2024-11-12 NOTE — ASSESSMENT & PLAN NOTE
Lab Results   Component Value Date    PSA 3.830 09/26/2024    PSA 4.350 (H) 06/28/2024    PSA 4.5 (H) 03/02/2023

## 2024-11-12 NOTE — ASSESSMENT & PLAN NOTE
He is overdue for colonoscopy and follow up with colorectal (Dr. Davis)   Referrals placed     Orders:    Ambulatory Referral to Gastroenterology; Future    Ambulatory Referral to Colorectal Surgery; Future

## 2024-11-12 NOTE — PROGRESS NOTES
Virtual Regular Visit  Name: Rikki Arguello      : 1960      MRN: [id number]  Encounter Provider: KALPANA Yañez  Encounter Date: 2024   Encounter department: [facility]    Verification of patient location:    Patient is located at Home in the following state in which I hold an active license [place]    Assessment & Plan  Benign essential hypertension  BP within goal, not on any antihypertensive medications, however will add low dose ACE due to microalbuminuria            Paraplegic spinal paralysis (HCC)  Wheelchair bound, continue to work with home care for pressure ulcer prevention/ weight offloading            Type 2 diabetes mellitus without complication, without long-term current use of insulin (HCC)    Lab Results   Component Value Date    HGBA1C 5.3 2024     Diet controlled  Not on statin, starting ACE   Eye exam UTD  Hx of amputation R,     Orders:    lisinopril (ZESTRIL) 2.5 mg tablet; Take 1 tablet (2.5 mg total) by mouth daily    Elevated prostate specific antigen (PSA)  Lab Results   Component Value Date    PSA 3.830 2024    PSA 4.350 (H) 2024    PSA 4.5 (H) 2023              Heme positive stool  He is overdue for colonoscopy and follow up with colorectal (Dr. Davis)   Referrals placed     Orders:    Ambulatory Referral to Gastroenterology; Future    Ambulatory Referral to Colorectal Surgery; Future    Microalbuminuria    Orders:    lisinopril (ZESTRIL) 2.5 mg tablet; Take 1 tablet (2.5 mg total) by mouth daily    Iron deficiency anemia due to chronic blood loss  Lab Results   Component Value Date    WBC 5.28 2024    HGB 10.1 (L) 2024    HCT 36.0 (L) 2024    MCV 75 (L) 2024     (H) 2024     Lab Results   Component Value Date    IRON 13 (L) 2024    TIBC 290 2024    FERRITIN 12 (L) 2024     he is due for colonoscopy, referral placed  Also recommend follow up with  colorectal surgery   Will also have patient complete Venofer infusions x 5 as per hematology recommends, referral team to assist with scheduling            Anemia, unspecified type  Lab Results   Component Value Date    WBC 5.28 09/26/2024    HGB 10.1 (L) 09/26/2024    HCT 36.0 (L) 09/26/2024    MCV 75 (L) 09/26/2024     (H) 09/26/2024     Lab Results   Component Value Date    IRON 13 (L) 09/26/2024    TIBC 290 09/26/2024    FERRITIN 12 (L) 09/26/2024     he is due for colonoscopy, referral placed  Also recommend follow up with colorectal surgery   Will also have patient complete Venofer infusions x 5 as per hematology recommends, referral team to assist with scheduling                 Encounter provider KALPANA Yañez    The patient was identified by name and date of birth. Rikki Arguello was informed that this is a telemedicine visit and that the visit is being conducted through the Epic Embedded platform. He agrees to proceed..  My office door was closed. No one else was in the room.  He acknowledged consent and understanding of privacy and security of the video platform. The patient has agreed to participate and understands they can discontinue the visit at any time.    Patient is aware this is a billable service.     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    Patient reports no change in  condition. Continues to follow with wound care for pressure ulcers to the sacral area and right hip. VNA is assisting with dressing changes. He has no new concerns or issues.     The following portions of the patient's history were reviewed and updated as appropriate: allergies, current medications, past family history, past medical history, past social history, past surgical history and problem list.          History obtained from : patient  Review of Systems   Constitutional:  Negative for chills and fever.   HENT:  Negative for ear pain and sore throat.    Eyes:  Negative for pain and visual disturbance.   Respiratory:  Negative for cough and shortness of breath.    Cardiovascular:  Negative for chest pain and palpitations.   Gastrointestinal:  Negative for abdominal pain and vomiting.   Genitourinary:  Negative for dysuria and hematuria.   Musculoskeletal:  Positive for arthralgias, back pain, gait problem, myalgias and neck pain.   Skin:  Negative for color change and rash.   Neurological:  Negative for seizures and syncope.   All other systems reviewed and are negative.          Objective     There were no vitals taken for this visit.  Physical Exam  Pulmonary:      Effort: No respiratory distress.   Neurological:      Mental Status: He is alert and oriented to person, place, and time.   Psychiatric:         Mood and Affect: Mood normal.         Behavior: Behavior normal.         Visit Time  Total Visit Duration: 15 minutes

## 2024-11-12 NOTE — ASSESSMENT & PLAN NOTE
BP within goal, not on any antihypertensive medications, however will add low dose ACE due to microalbuminuria

## 2024-11-12 NOTE — ASSESSMENT & PLAN NOTE
Wheelchair bound, continue to work with home care for pressure ulcer prevention/ weight offloading

## 2024-11-13 ENCOUNTER — HOME CARE VISIT (OUTPATIENT)
Dept: HOME HEALTH SERVICES | Facility: HOME HEALTHCARE | Age: 64
End: 2024-11-13
Payer: MEDICARE

## 2024-11-13 VITALS
TEMPERATURE: 97.3 F | RESPIRATION RATE: 18 BRPM | SYSTOLIC BLOOD PRESSURE: 110 MMHG | DIASTOLIC BLOOD PRESSURE: 60 MMHG | OXYGEN SATURATION: 97 % | HEART RATE: 78 BPM

## 2024-11-13 PROCEDURE — G0299 HHS/HOSPICE OF RN EA 15 MIN: HCPCS

## 2024-11-14 ENCOUNTER — TELEPHONE (OUTPATIENT)
Dept: FAMILY MEDICINE CLINIC | Facility: CLINIC | Age: 64
End: 2024-11-14

## 2024-11-14 NOTE — TELEPHONE ENCOUNTER
Send this request to Rosa Souffront     Utility Company ([employer]): [employer]    Shut off date: 11/15    Account number: [id number]    Name on Account: Rikkipankaj Farooqnichol    Relationship to Patient:Self    Address on Bill: [address] [place] [id number]     Phone number: [phone]

## 2024-11-15 ENCOUNTER — HOME CARE VISIT (OUTPATIENT)
Dept: HOME HEALTH SERVICES | Facility: HOME HEALTHCARE | Age: 64
End: 2024-11-15
Payer: MEDICARE

## 2024-11-15 ENCOUNTER — OFFICE VISIT (OUTPATIENT)
Dept: WOUND CARE | Facility: CLINIC | Age: 64
End: 2024-11-15
Payer: MEDICARE

## 2024-11-15 VITALS
HEART RATE: 78 BPM | TEMPERATURE: 97.1 F | OXYGEN SATURATION: 99 % | DIASTOLIC BLOOD PRESSURE: 62 MMHG | RESPIRATION RATE: 18 BRPM | SYSTOLIC BLOOD PRESSURE: 120 MMHG

## 2024-11-15 VITALS
DIASTOLIC BLOOD PRESSURE: 72 MMHG | HEART RATE: 64 BPM | SYSTOLIC BLOOD PRESSURE: 110 MMHG | RESPIRATION RATE: 16 BRPM | TEMPERATURE: 96.7 F

## 2024-11-15 DIAGNOSIS — E11.9 TYPE 2 DIABETES MELLITUS WITHOUT COMPLICATION, WITHOUT LONG-TERM CURRENT USE OF INSULIN (HCC): ICD-10-CM

## 2024-11-15 DIAGNOSIS — L89.154 STAGE IV PRESSURE ULCER OF SACRAL REGION (HCC): Primary | ICD-10-CM

## 2024-11-15 PROCEDURE — 11042 DBRDMT SUBQ TIS 1ST 20SQCM/<: CPT | Performed by: SURGERY

## 2024-11-15 PROCEDURE — 11045 DBRDMT SUBQ TISS EACH ADDL: CPT | Performed by: SURGERY

## 2024-11-15 NOTE — TELEPHONE ENCOUNTER
[employer] Medical Certification signed by Leigh Ann Toure and faxed on 11/14/24. Confirmation received and scanned into chart. Patient has been informed.

## 2024-11-15 NOTE — PROGRESS NOTES
Wound Procedure Treatment Pressure Injury Sacrum    Performed by: Anila Cheng RN  Authorized by: Tex Yan MD    Associated wounds:   Wound 04/19/24 Pressure Injury Sacrum  Wound cleansed with:  NSS  Applied to periwound:  Skin prep  Applied primary dressing:  Calcium alginate  Applied secondary dressing:  Gauze and ABD  Dressing secured with:  Tape

## 2024-11-15 NOTE — PROGRESS NOTES
"Patient ID: Rikki Arguello is a 64 y.o. male Date of Birth 1960     Chief Complaint  Chief Complaint   Patient presents with    Follow Up Wound Care Visit       Allergies  Patient has no known allergies.    Assessment:    Stage IV pressure ulcer of sacral region (HCC)  The wound is about the same with significant portions of undermining and tunneling with a skin bridge in the middle.  The base was debrided of some slough and biofilm.  Continue the same care.  Follow-up in a month.                     Diagnoses and all orders for this visit:    Stage IV pressure ulcer of sacral region (HCC)  -     Wound cleansing and dressings Pressure Injury Sacrum; Future  -     Wound Procedure Treatment Pressure Injury Sacrum    Type 2 diabetes mellitus without complication, without long-term current use of insulin (HCC)    Other orders  -     Debridement                Debridement   Wound 04/19/24 Pressure Injury Sacrum    Universal Protocol:  Consent: Verbal consent obtained.  Consent given by: patient  Time out: Immediately prior to procedure a \"time out\" was called to verify the correct patient, procedure, equipment, support staff and site/side marked as required.    Debridement Details  Performed by: physician  Debridement type: surgical  Level of debridement: subcutaneous tissue  Pain control: lidocaine 4%      Post-debridement measurements  Length (cm): 8.5  Width (cm): 8  Depth (cm): 2.5  Percent debrided: 40%  Surface Area (cm^2): 68  Area Debrided (cm^2): 27.2  Volume (cm^3): 170    Tissue and other material debrided: subcutaneous tissue  Devitalized tissue debrided: biofilm and slough  Instrument(s) utilized: curette  Procedural pain (0-10): insensate  Post-procedural pain: insensate   Response to treatment: procedure was tolerated well        Plan:     Wound 04/19/24 Pressure Injury Sacrum (Active)   Wound Image Images linked 11/15/24 6575   Wound Description Granulation " tissue;Yellow;Pink;Slough;Epithelialization 11/15/24 1109   Pressure Injury Stage 4 11/15/24 1109   Shelby-wound Assessment Intact;Maceration 11/15/24 1109   Wound Length (cm) 8.5 cm 11/15/24 1109   Wound Width (cm) 8 cm 11/15/24 1109   Wound Depth (cm) 2.5 cm 11/15/24 1109   Wound Surface Area (cm^2) 68 cm^2 11/15/24 1109   Wound Volume (cm^3) 170 cm^3 11/15/24 1109   Calculated Wound Volume (cm^3) 170 cm^3 11/15/24 1109   Change in Wound Size % -155.64 11/15/24 1109   Number of tunnels 1 11/15/24 1109   Tunneling 1 3.2 cm 11/15/24 1109   Tunneling 1 in depth located at 1 oclock 11/15/24 1109   Number of underminings 1 11/15/24 1109   Undermining 1 2.2 11/15/24 1109   Undermining 1 is depth extending from 8-11 oclock 11/15/24 1109   Drainage Amount Copious 11/15/24 1109   Drainage Description Serosanguineous;Alva;Milky 11/15/24 1109   Non-staged Wound Description Full thickness 11/15/24 1109   Wound packed? Yes 11/15/24 1109   Packing- # removed 1 11/15/24 1109   Dressing Status Intact 11/15/24 1109       Wound 04/19/24 Pressure Injury Sacrum (Active)   Date First Assessed: 04/19/24   Primary Wound Type: Pressure Injury  Location: Sacrum  Wound Outcome: Palliative       Wound 09/30/24 Skin Tear Abrasion(s) Hip Left (Active)   Date First Assessed: 09/30/24   Primary Wound Type: Skin Tear  Traumatic Wound Type: Abrasion(s)  Location: Hip  Wound Location Orientation: Left  Wound Description (Comments): pink gran pc of skin peeled back   Incision's 1st Dressing: cleansed with ...       [REMOVED] Wound 08/26/19 Buttocks Left;Distal;Lateral (Removed)   Resolved Date/Resolved Time: 08/26/20 1056  Date First Assessed/Time First Assessed: 08/26/19 1130   Pre-Existing Wound: Yes  Location: Buttocks  Wound Location Orientation: Left;Distal;Lateral  Wound Description (Comments): Deep ischial wound       [REMOVED] Wound 09/16/19 Buttocks Right;Distal (Removed)   Resolved Date/Resolved Time: 08/26/20 1055  Date First Assessed/Time  First Assessed: 09/16/19 1657   Pre-Existing Wound: Yes  Location: Buttocks  Wound Location Orientation: Right;Distal       [REMOVED] Wound 10/28/19 Knee Left (Removed)   Resolved Date/Resolved Time: 08/26/20 1055  Date First Assessed/Time First Assessed: 10/28/19 0657   Pre-Existing Wound: Yes  Location: Knee  Wound Location Orientation: Left  Wound Description (Comments): round black  Dressing Status: Open to air       [REMOVED] Wound 10/28/19 Buttocks Left;Mid (Removed)   Resolved Date/Resolved Time: 08/26/20 1056  Date First Assessed/Time First Assessed: 10/28/19 1108   Pre-Existing Wound: Yes  Location: Buttocks  Wound Location Orientation: Left;Mid  Wound Description (Comments): Stage 2 vs traumatic injury       [REMOVED] Wound 11/01/19 Other (Comment) N/A (Removed)   Resolved Date/Resolved Time: 08/26/20 1055  Date First Assessed/Time First Assessed: 11/01/19 1640   Location: Other (Comment)  Wound Location Orientation: N/A  Wound Description (Comments): scrotum dressed with exofin       [REMOVED] Wound 08/26/20 Pressure Injury Buttocks Left (Removed)   Resolved Date: 04/19/24  Date First Assessed/Time First Assessed: 08/26/20 1056   Primary Wound Type: Pressure Injury  Location: Buttocks  Wound Location Orientation: Left  Wound Outcome: (c) Converged       [REMOVED] Wound 08/26/20 Pressure Injury Hip Left (Removed)   Resolved Date: 10/04/23  Date First Assessed/Time First Assessed: 08/26/20 1057   Primary Wound Type: Pressure Injury  Location: Hip  Wound Location Orientation: Left  Wound Outcome: Palliative       [REMOVED] Wound 07/29/21 Pressure Injury Back Right;Lower;Lateral (Removed)   Resolved Date/Resolved Time: 01/23/24 1934  Date First Assessed: 07/29/21   Primary Wound Type: Pressure Injury  Location: Back  Wound Location Orientation: Right;Lower;Lateral  Wound Outcome: Healed       [REMOVED] Wound 11/05/21 Pressure Injury Sacrum (Removed)   Resolved Date: 04/19/24  Date First Assessed/Time First  Assessed: 11/05/21 1059   Present on Original Admission: Yes  Primary Wound Type: Pressure Injury  Location: Sacrum  Wound Outcome: (c) Palliative       [REMOVED] Wound 05/27/22 Skin Tear Buttocks Left;Proximal (Removed)   Resolved Date/Resolved Time: 08/05/22 0851  Date First Assessed/Time First Assessed: 05/27/22 0946   Primary Wound Type: Skin Tear  Location: Buttocks  Wound Location Orientation: Left;Proximal  Wound Outcome: Healed       [REMOVED] Wound 03/17/23 Pressure Injury Hip Lateral;Left;Proximal (Removed)   Resolved Date: 08/21/23  Date First Assessed/Time First Assessed: 03/17/23 1039   Primary Wound Type: Pressure Injury  Location: Hip  Wound Location Orientation: Lateral;Left;Proximal  Wound Outcome: Healed       [REMOVED] Wound 06/19/23 Pressure Injury Hip Left;Medial (Removed)   Resolved Date: 09/18/23  Date First Assessed: 06/19/23   Present on Original Admission: No  Primary Wound Type: Pressure Injury  Location: Hip  Wound Location Orientation: Left;Medial  Wound Description (Comments): granulation  yellow serous drainage ...       [REMOVED] Wound 10/13/23 Pressure Injury Finger (Comment which one) Right (Removed)   Resolved Date: 11/20/23  Date First Assessed/Time First Assessed: 10/13/23 1035   Primary Wound Type: Pressure Injury  Location: Finger (Comment which one)  Wound Location Orientation: Right  Wound Outcome: Healed       [REMOVED] Wound 01/17/24 Pressure Injury Flank Right;Upper (Removed)   Resolved Date: 02/02/24  Date First Assessed/Time First Assessed: 01/17/24 1926   Present on Original Admission: Yes  Primary Wound Type: Pressure Injury  Location: Flank  Wound Location Orientation: Right;Upper       [REMOVED] Wound 01/17/24 Knee Anterior;Left (Removed)   Resolved Date: 02/02/24  Date First Assessed/Time First Assessed: 01/17/24 1930   Location: Knee  Wound Location Orientation: Anterior;Left       [REMOVED] Wound 01/18/24 Pressure Injury Hip Left (Removed)   Resolved Date:  08/09/24  Date First Assessed/Time First Assessed: 01/18/24 1137   Primary Wound Type: Pressure Injury  Location: Hip  Wound Location Orientation: Left  Wound Outcome: Healed       [REMOVED] Wound 01/25/24 Chest Right (Removed)   Resolved Date: 03/15/24  Date First Assessed/Time First Assessed: 01/25/24 1205   Location: Chest  Wound Location Orientation: Right  Wound Description (Comments): addy [id number] negative pressure wound therapy system  Incision's 1st Dressing: DRESSING ...       Subjective:      .    Mr. Arguello is a 61-year-old gentleman with paraplegia and history of multiple pressure wounds with 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.  He had been rescheduled for a debridement and flap closure by plastics in August but developed a new wound on his right mid to lower back chest wall.  This wound probes deep and has been closed on the outside but the tract has not been improving.  He had a CT scan that shows a deep tracking to the muscle but no fistulization to the internal thoracic cavity.    02/04/2022 he returns now for re-evaluation.  He still has had visiting nurses but has not been seen in the Wound Center since November.  He denies any change or complaint    03/11/2022 no changes since been seen last.  No new complaints.    04/22/2022 no issues.  No changes.    05/27/2022.  Doing well.  Denies fevers or chills.  No issues spoke about plastics a re-evaluation but he wants to wait for COVID to wane more and maybe next fall    07/22/2022.  He has not been seen here since May mostly due to transportation issues.  He has significant more pain and drainage on his right back chest wall wound.  Otherwise no changes    08/05/2022 denies fevers or chills.  Still in pain on the right side.    3/17/2023 he returns for evaluation.  He was seen in the wound center by another provider month or 2 ago.    6/9/2023.  Here for long-term follow-up.  No significant changes.    7/14/2023.  No changes.  Doing  well.    9/22/2023 he had an outpatient CT scan.  The right chest wall wound has increasing drainage.  The dressings from the sacral area have some greenish color    10/13/2023.  He did receive a VAC.  He still has greenish drainage on the dressings    10/27/2023 no significant changes.  He states he did get his hospital mattress.  Tolerating the VAC well.    12/8/2023.  He has had significant creasing drainage from the VAC dressing from the right back chest wall wound.  Difficulty maintaining enough canisters for the VAC machine.    12/22/2023 he denies any change.  Denies any fevers or chills.  Denies any issues.    4/19/2024 he returns now for resumption of care.  Since being seen last he did get admitted to [facility] and underwent extensive debridement of the chest wall wound rib resection with latissimus dorsi flap closure.  That wound did very well and the wound is healed.  He is here for evaluation of his chronic sacral and hip wounds.    5/31/2024.  Overall is doing well.  He was seen by plastics and his right side is healing well.  He is complain of more pain today from the sacrum.  Denies fevers or chills.  Visit nurses and his wife are doing dressing changes      6/20/2024.  Note complaints or changes since seen last.    8/9/2024 no issues.  No changes.  Still the same dressing changes.    9/20/2024.  He reports no changes.  Doing well.  No new wounds.    10/18/2024 still doing very well.  Doing dressing changes without difficulty.  No new issues.    11/15/2024 no new issues no changes.                The following portions of the patient's history were reviewed and updated as appropriate: allergies, current medications, past family history, past medical history, past social history, past surgical history and problem list.    Review of Systems   Constitutional:  Negative for chills and fever.   HENT:  Negative for ear pain and sore throat.    Eyes:  Negative for pain and visual disturbance.    Respiratory:  Negative for cough and shortness of breath.    Cardiovascular:  Negative for chest pain.   Gastrointestinal:  Negative for abdominal pain and vomiting.   Skin:  Positive for wound. Negative for color change.   Neurological:  Negative for seizures and syncope.   Psychiatric/Behavioral:  Negative for agitation and behavioral problems.    All other systems reviewed and are negative.        Objective:       Wound 04/19/24 Pressure Injury Sacrum (Active)   Wound Image Images linked 11/15/24 1125   Wound Description Granulation tissue;Yellow;Pink;Slough;Epithelialization 11/15/24 1109   Pressure Injury Stage 4 11/15/24 1109   Shelby-wound Assessment Intact;Maceration 11/15/24 1109   Wound Length (cm) 8.5 cm 11/15/24 1109   Wound Width (cm) 8 cm 11/15/24 1109   Wound Depth (cm) 2.5 cm 11/15/24 1109   Wound Surface Area (cm^2) 68 cm^2 11/15/24 1109   Wound Volume (cm^3) 170 cm^3 11/15/24 1109   Calculated Wound Volume (cm^3) 170 cm^3 11/15/24 1109   Change in Wound Size % -155.64 11/15/24 1109   Number of tunnels 1 11/15/24 1109   Tunneling 1 3.2 cm 11/15/24 1109   Tunneling 1 in depth located at 1 oclock 11/15/24 1109   Number of underminings 1 11/15/24 1109   Undermining 1 2.2 11/15/24 1109   Undermining 1 is depth extending from 8-11 oclock 11/15/24 1109   Drainage Amount Copious 11/15/24 1109   Drainage Description Serosanguineous;Alva;Milky 11/15/24 1109   Non-staged Wound Description Full thickness 11/15/24 1109   Wound packed? Yes 11/15/24 1109   Packing- # removed 1 11/15/24 1109   Dressing Status Intact 11/15/24 1109       /72   Pulse 64   Temp (!) 96.7 °F (35.9 °C)   Resp 16     Physical Exam  Vitals and nursing note reviewed. Exam conducted with a chaperone present.   Constitutional:       Appearance: Normal appearance.   Cardiovascular:      Rate and Rhythm: Normal rate and regular rhythm.   Pulmonary:      Effort: Pulmonary effort is normal.      Breath sounds: Normal breath sounds.    Abdominal:      General: There is no distension.      Palpations: Abdomen is soft. There is no mass.      Tenderness: There is no abdominal tenderness.      Comments: Ostomy   Neurological:      Mental Status: He is alert.   Psychiatric:         Mood and Affect: Mood normal.         Behavior: Behavior normal.           Wound Instructions:  Orders Placed This Encounter   Procedures    Wound cleansing and dressings Pressure Injury Sacrum     Wash your hands with soap and water.    Remove old dressing, discard into plastic bag and place in trash.    Cleanse the wound with Mild Soap & Water or normal saline prior to applying a clean dressing.   Do not use tissue or cotton balls. Do not scrub the wound. Pat dry using gauze.     Sacral wound:  Skin prep to gaby-wound  Apply Calcium Alginate to all wound surfaces and under skin bridge followed by fluffed 4 x 4's to keep alginate in place.   Cover with ABD's.   Tape in place   Change dressings daily and as needed for excessive drainage         Keep pressure off of all wounds as much as possible, limit sitting in chair as much as possible       Continue VNA three x per week (wife will do in between) for dressing changes, except on the day the patient goes to the wound center.      OFF-LOADING   Avoid pressure at wound site. - all wounds, including right back   Wheelchair Cushion. - ROHO cushion      Do Not Sit for Long Periods of Time. - 1 hr max for meals only, otherwise in bed and turn side to side at least   every 3 hours or more frequently         Reposition at least every 1-2 hours while awake.       Follow up in 4 weeks with Dr. Yan.     Standing Status:   Future     Expiration Date:   12/13/2024    Wound Procedure Treatment Pressure Injury Sacrum     This order was created via procedure documentation    Debridement     This order was created via procedure documentation        Diagnosis ICD-10-CM Associated Orders   1. Stage IV pressure ulcer of sacral region  ([facility])  L89.154 Wound cleansing and dressings Pressure Injury Sacrum     Wound Procedure Treatment Pressure Injury Sacrum      2. Type 2 diabetes mellitus without complication, without long-term current use of insulin ([facility])  E11.9

## 2024-11-15 NOTE — ASSESSMENT & PLAN NOTE
The wound is about the same with significant portions of undermining and tunneling with a skin bridge in the middle.  The base was debrided of some slough and biofilm.  Continue the same care.  Follow-up in a month.

## 2024-11-15 NOTE — PATIENT INSTRUCTIONS
Orders Placed This Encounter   Procedures    Wound cleansing and dressings Pressure Injury Sacrum     Wash your hands with soap and water.    Remove old dressing, discard into plastic bag and place in trash.    Cleanse the wound with Mild Soap & Water or normal saline prior to applying a clean dressing.   Do not use tissue or cotton balls. Do not scrub the wound. Pat dry using gauze.     Sacral wound:  Skin prep to gaby-wound  Apply Calcium Alginate to all wound surfaces and under skin bridge followed by fluffed 4 x 4's to keep alginate in place.   Cover with ABD's.   Tape in place   Change dressings daily and as needed for excessive drainage         Keep pressure off of all wounds as much as possible, limit sitting in chair as much as possible       Continue VNA three x per week (wife will do in between) for dressing changes, except on the day the patient goes to the wound center.      OFF-LOADING   Avoid pressure at wound site. - all wounds, including right back   Wheelchair Cushion. - ROHO cushion      Do Not Sit for Long Periods of Time. - 1 hr max for meals only, otherwise in bed and turn side to side at least   every 3 hours or more frequently         Reposition at least every 1-2 hours while awake.       Follow up in 4 weeks with Dr. Yan.     Standing Status:   Future     Expiration Date:   12/13/2024

## 2024-11-18 ENCOUNTER — HOME CARE VISIT (OUTPATIENT)
Dept: HOME HEALTH SERVICES | Facility: HOME HEALTHCARE | Age: 64
End: 2024-11-18
Payer: MEDICARE

## 2024-11-18 PROCEDURE — G0299 HHS/HOSPICE OF RN EA 15 MIN: HCPCS

## 2024-11-20 ENCOUNTER — HOME CARE VISIT (OUTPATIENT)
Dept: HOME HEALTH SERVICES | Facility: HOME HEALTHCARE | Age: 64
End: 2024-11-20
Payer: MEDICARE

## 2024-11-20 VITALS
SYSTOLIC BLOOD PRESSURE: 120 MMHG | DIASTOLIC BLOOD PRESSURE: 60 MMHG | TEMPERATURE: 97.4 F | HEART RATE: 76 BPM | RESPIRATION RATE: 18 BRPM | OXYGEN SATURATION: 97 %

## 2024-11-20 PROCEDURE — G0299 HHS/HOSPICE OF RN EA 15 MIN: HCPCS

## 2024-11-21 DIAGNOSIS — D64.9 ANEMIA, UNSPECIFIED TYPE: ICD-10-CM

## 2024-11-21 DIAGNOSIS — R19.5 HEME POSITIVE STOOL: Primary | ICD-10-CM

## 2024-11-21 RX ORDER — SODIUM CHLORIDE 9 MG/ML
20 INJECTION, SOLUTION INTRAVENOUS ONCE
OUTPATIENT
Start: 2025-01-01

## 2024-11-22 ENCOUNTER — HOME CARE VISIT (OUTPATIENT)
Dept: HOME HEALTH SERVICES | Facility: HOME HEALTHCARE | Age: 64
End: 2024-11-22
Payer: MEDICARE

## 2024-11-22 PROCEDURE — G0299 HHS/HOSPICE OF RN EA 15 MIN: HCPCS

## 2024-11-24 VITALS
RESPIRATION RATE: 18 BRPM | DIASTOLIC BLOOD PRESSURE: 60 MMHG | TEMPERATURE: 97.4 F | SYSTOLIC BLOOD PRESSURE: 110 MMHG | HEART RATE: 68 BPM | OXYGEN SATURATION: 96 %

## 2024-11-24 VITALS
SYSTOLIC BLOOD PRESSURE: 120 MMHG | DIASTOLIC BLOOD PRESSURE: 60 MMHG | RESPIRATION RATE: 18 BRPM | HEART RATE: 78 BPM | TEMPERATURE: 97.4 F | OXYGEN SATURATION: 97 %

## 2024-11-25 ENCOUNTER — HOME CARE VISIT (OUTPATIENT)
Dept: HOME HEALTH SERVICES | Facility: HOME HEALTHCARE | Age: 64
End: 2024-11-25
Payer: MEDICARE

## 2024-11-25 VITALS
HEART RATE: 68 BPM | TEMPERATURE: 97.1 F | SYSTOLIC BLOOD PRESSURE: 120 MMHG | RESPIRATION RATE: 20 BRPM | OXYGEN SATURATION: 97 % | DIASTOLIC BLOOD PRESSURE: 70 MMHG

## 2024-11-25 DIAGNOSIS — F11.20 CONTINUOUS OPIOID DEPENDENCE (HCC): ICD-10-CM

## 2024-11-25 DIAGNOSIS — M86.68 OTHER CHRONIC OSTEOMYELITIS, OTHER SITE (HCC): ICD-10-CM

## 2024-11-25 DIAGNOSIS — R80.9 MICROALBUMINURIA: ICD-10-CM

## 2024-11-25 DIAGNOSIS — E11.9 TYPE 2 DIABETES MELLITUS WITHOUT COMPLICATION, WITHOUT LONG-TERM CURRENT USE OF INSULIN (HCC): ICD-10-CM

## 2024-11-25 DIAGNOSIS — K21.9 GASTROESOPHAGEAL REFLUX DISEASE WITHOUT ESOPHAGITIS: ICD-10-CM

## 2024-11-25 DIAGNOSIS — M54.50 CHRONIC LOW BACK PAIN WITHOUT SCIATICA, UNSPECIFIED BACK PAIN LATERALITY: ICD-10-CM

## 2024-11-25 DIAGNOSIS — G89.29 CHRONIC LOW BACK PAIN WITHOUT SCIATICA, UNSPECIFIED BACK PAIN LATERALITY: ICD-10-CM

## 2024-11-25 PROCEDURE — G0299 HHS/HOSPICE OF RN EA 15 MIN: HCPCS

## 2024-11-27 ENCOUNTER — HOME CARE VISIT (OUTPATIENT)
Dept: HOME HEALTH SERVICES | Facility: HOME HEALTHCARE | Age: 64
End: 2024-11-27
Payer: MEDICARE

## 2024-11-27 PROCEDURE — G0299 HHS/HOSPICE OF RN EA 15 MIN: HCPCS

## 2024-11-29 ENCOUNTER — HOME CARE VISIT (OUTPATIENT)
Dept: HOME HEALTH SERVICES | Facility: HOME HEALTHCARE | Age: 64
End: 2024-11-29
Payer: MEDICARE

## 2024-11-29 PROCEDURE — G0299 HHS/HOSPICE OF RN EA 15 MIN: HCPCS

## 2024-11-29 PROCEDURE — 400014 VN F/U

## 2024-11-29 RX ORDER — LANCETS
EACH MISCELLANEOUS DAILY
Qty: 100 EACH | Refills: 0 | Status: SHIPPED | OUTPATIENT
Start: 2024-11-29

## 2024-12-02 ENCOUNTER — HOME CARE VISIT (OUTPATIENT)
Dept: HOME HEALTH SERVICES | Facility: HOME HEALTHCARE | Age: 64
End: 2024-12-02
Payer: MEDICARE

## 2024-12-02 VITALS
RESPIRATION RATE: 18 BRPM | SYSTOLIC BLOOD PRESSURE: 120 MMHG | OXYGEN SATURATION: 97 % | TEMPERATURE: 97.2 F | DIASTOLIC BLOOD PRESSURE: 60 MMHG | HEART RATE: 78 BPM

## 2024-12-02 PROCEDURE — G0299 HHS/HOSPICE OF RN EA 15 MIN: HCPCS

## 2024-12-03 ENCOUNTER — TELEPHONE (OUTPATIENT)
Dept: PLASTIC SURGERY | Facility: CLINIC | Age: 64
End: 2024-12-03

## 2024-12-03 VITALS
RESPIRATION RATE: 18 BRPM | SYSTOLIC BLOOD PRESSURE: 110 MMHG | DIASTOLIC BLOOD PRESSURE: 62 MMHG | HEART RATE: 68 BPM | TEMPERATURE: 97.7 F | OXYGEN SATURATION: 97 %

## 2024-12-03 RX ORDER — ACETAMINOPHEN 325 MG/1
650 TABLET ORAL EVERY 6 HOURS PRN
Qty: 40 TABLET | Refills: 0 | Status: SHIPPED | OUTPATIENT
Start: 2024-12-03

## 2024-12-03 RX ORDER — LISINOPRIL 2.5 MG/1
2.5 TABLET ORAL DAILY
Qty: 30 TABLET | Refills: 0 | Status: SHIPPED | OUTPATIENT
Start: 2024-12-03

## 2024-12-03 RX ORDER — OXYCODONE HYDROCHLORIDE 15 MG/1
15 TABLET ORAL EVERY 6 HOURS PRN
Qty: 120 TABLET | Refills: 0 | Status: SHIPPED | OUTPATIENT
Start: 2024-12-03

## 2024-12-03 RX ORDER — IBUPROFEN 800 MG/1
800 TABLET, FILM COATED ORAL EVERY 8 HOURS PRN
Qty: 60 TABLET | Refills: 0 | Status: SHIPPED | OUTPATIENT
Start: 2024-12-03

## 2024-12-03 RX ORDER — ASPIRIN 81 MG/1
81 TABLET ORAL DAILY
Qty: 90 TABLET | Refills: 0 | Status: SHIPPED | OUTPATIENT
Start: 2024-12-03

## 2024-12-03 NOTE — TELEPHONE ENCOUNTER
Emailed patient that we had to change his appointment time from 2:45 to 3 pm on Friday 12/6.  Gave main number if any questions.

## 2024-12-04 ENCOUNTER — HOME CARE VISIT (OUTPATIENT)
Dept: HOME HEALTH SERVICES | Facility: HOME HEALTHCARE | Age: 64
End: 2024-12-04
Payer: MEDICARE

## 2024-12-04 ENCOUNTER — TELEPHONE (OUTPATIENT)
Dept: UROLOGY | Facility: AMBULATORY SURGERY CENTER | Age: 64
End: 2024-12-04

## 2024-12-04 VITALS
OXYGEN SATURATION: 97 % | SYSTOLIC BLOOD PRESSURE: 120 MMHG | DIASTOLIC BLOOD PRESSURE: 60 MMHG | RESPIRATION RATE: 18 BRPM | TEMPERATURE: 97.8 F | HEART RATE: 68 BPM

## 2024-12-04 PROCEDURE — G0299 HHS/HOSPICE OF RN EA 15 MIN: HCPCS

## 2024-12-04 NOTE — TELEPHONE ENCOUNTER
Incoming Fax received from [facility] Transportation Program Out of Service Area Certification Form.    -Scanned Into

## 2024-12-05 NOTE — TELEPHONE ENCOUNTER
Patient was calling in regards to the [employer] transportation form that was filled out for him. It is missing information. The second part of question #1 asks for the Name and Address of the facility the patient is requesting transportation to outside of the service area.     Can this please be filled out and faxed back again?    Needs to be done ASAP because the appt is on Monday. Patient requesting a call back when it has been completed.

## 2024-12-05 NOTE — TELEPHONE ENCOUNTER
Patient calling in for update on [employer] [facility] transport for his upcoming apt on 12/9 with . Requesting phone call back.       CB: [phone]

## 2024-12-06 ENCOUNTER — HOME CARE VISIT (OUTPATIENT)
Dept: HOME HEALTH SERVICES | Facility: HOME HEALTHCARE | Age: 64
End: 2024-12-06
Payer: MEDICARE

## 2024-12-06 ENCOUNTER — OFFICE VISIT (OUTPATIENT)
Dept: PLASTIC SURGERY | Facility: CLINIC | Age: 64
End: 2024-12-06
Payer: MEDICARE

## 2024-12-06 VITALS
TEMPERATURE: 97.4 F | HEART RATE: 76 BPM | DIASTOLIC BLOOD PRESSURE: 60 MMHG | SYSTOLIC BLOOD PRESSURE: 110 MMHG | OXYGEN SATURATION: 96 % | RESPIRATION RATE: 18 BRPM

## 2024-12-06 DIAGNOSIS — L89.324 DECUBITUS ULCER OF ISCHIUM, LEFT, STAGE IV (HCC): Primary | ICD-10-CM

## 2024-12-06 PROCEDURE — 99214 OFFICE O/P EST MOD 30 MIN: CPT | Performed by: PLASTIC SURGERY

## 2024-12-06 PROCEDURE — G0299 HHS/HOSPICE OF RN EA 15 MIN: HCPCS

## 2024-12-06 NOTE — TELEPHONE ENCOUNTER
Pt called and stated his transportation  arrangements for his 12/9 appointment are relying on a form that needs to be completed and faxed back directly to [facility]. Part of question #1 was completed incorrectly and they need an updated form containing the [place] office address.     Please call pt directly to confirm form has been resubmitted.

## 2024-12-09 ENCOUNTER — HOME CARE VISIT (OUTPATIENT)
Dept: HOME HEALTH SERVICES | Facility: HOME HEALTHCARE | Age: 64
End: 2024-12-09
Payer: MEDICARE

## 2024-12-09 VITALS
HEART RATE: 66 BPM | SYSTOLIC BLOOD PRESSURE: 122 MMHG | OXYGEN SATURATION: 98 % | TEMPERATURE: 98.6 F | DIASTOLIC BLOOD PRESSURE: 70 MMHG

## 2024-12-09 PROCEDURE — G0299 HHS/HOSPICE OF RN EA 15 MIN: HCPCS

## 2024-12-09 NOTE — TELEPHONE ENCOUNTER
Transportation form was on received so pt r/s appt and asking if form can be sent for [facility]    Pt call [phone]

## 2024-12-10 VITALS
SYSTOLIC BLOOD PRESSURE: 110 MMHG | RESPIRATION RATE: 18 BRPM | TEMPERATURE: 97.4 F | OXYGEN SATURATION: 98 % | HEART RATE: 88 BPM | DIASTOLIC BLOOD PRESSURE: 64 MMHG

## 2024-12-11 ENCOUNTER — HOME CARE VISIT (OUTPATIENT)
Dept: HOME HEALTH SERVICES | Facility: HOME HEALTHCARE | Age: 64
End: 2024-12-11
Payer: MEDICARE

## 2024-12-11 VITALS
TEMPERATURE: 97.9 F | SYSTOLIC BLOOD PRESSURE: 122 MMHG | DIASTOLIC BLOOD PRESSURE: 72 MMHG | HEART RATE: 72 BPM | RESPIRATION RATE: 16 BRPM | OXYGEN SATURATION: 97 %

## 2024-12-11 PROCEDURE — G0299 HHS/HOSPICE OF RN EA 15 MIN: HCPCS

## 2024-12-13 ENCOUNTER — HOME CARE VISIT (OUTPATIENT)
Dept: HOME HEALTH SERVICES | Facility: HOME HEALTHCARE | Age: 64
End: 2024-12-13
Payer: MEDICARE

## 2024-12-16 ENCOUNTER — HOME CARE VISIT (OUTPATIENT)
Dept: HOME HEALTH SERVICES | Facility: HOME HEALTHCARE | Age: 64
End: 2024-12-16
Payer: MEDICARE

## 2024-12-18 ENCOUNTER — HOME CARE VISIT (OUTPATIENT)
Dept: HOME HEALTH SERVICES | Facility: HOME HEALTHCARE | Age: 64
End: 2024-12-18
Payer: MEDICARE

## 2024-12-18 PROCEDURE — G0299 HHS/HOSPICE OF RN EA 15 MIN: HCPCS

## 2024-12-19 ENCOUNTER — HOME CARE VISIT (OUTPATIENT)
Dept: HOME HEALTH SERVICES | Facility: HOME HEALTHCARE | Age: 64
End: 2024-12-19
Payer: MEDICARE

## 2024-12-19 VITALS
DIASTOLIC BLOOD PRESSURE: 60 MMHG | OXYGEN SATURATION: 97 % | SYSTOLIC BLOOD PRESSURE: 122 MMHG | RESPIRATION RATE: 18 BRPM | HEART RATE: 64 BPM | TEMPERATURE: 97.4 F

## 2024-12-20 ENCOUNTER — HOME CARE VISIT (OUTPATIENT)
Dept: HOME HEALTH SERVICES | Facility: HOME HEALTHCARE | Age: 64
End: 2024-12-20
Payer: MEDICARE

## 2024-12-20 PROCEDURE — G0299 HHS/HOSPICE OF RN EA 15 MIN: HCPCS

## 2024-12-22 ENCOUNTER — HOME CARE VISIT (OUTPATIENT)
Dept: HOME HEALTH SERVICES | Facility: HOME HEALTHCARE | Age: 64
End: 2024-12-22
Payer: MEDICARE

## 2024-12-22 VITALS
HEART RATE: 55 BPM | SYSTOLIC BLOOD PRESSURE: 142 MMHG | OXYGEN SATURATION: 99 % | RESPIRATION RATE: 18 BRPM | TEMPERATURE: 97.6 F | DIASTOLIC BLOOD PRESSURE: 88 MMHG

## 2024-12-22 PROCEDURE — G0299 HHS/HOSPICE OF RN EA 15 MIN: HCPCS

## 2024-12-23 ENCOUNTER — HOME CARE VISIT (OUTPATIENT)
Dept: HOME HEALTH SERVICES | Facility: HOME HEALTHCARE | Age: 64
End: 2024-12-23
Payer: MEDICARE

## 2024-12-23 VITALS
HEART RATE: 80 BPM | OXYGEN SATURATION: 97 % | DIASTOLIC BLOOD PRESSURE: 62 MMHG | SYSTOLIC BLOOD PRESSURE: 120 MMHG | TEMPERATURE: 97.7 F | RESPIRATION RATE: 18 BRPM

## 2024-12-23 PROCEDURE — G0299 HHS/HOSPICE OF RN EA 15 MIN: HCPCS

## 2024-12-24 ENCOUNTER — HOME CARE VISIT (OUTPATIENT)
Dept: HOME HEALTH SERVICES | Facility: HOME HEALTHCARE | Age: 64
End: 2024-12-24
Payer: MEDICARE

## 2024-12-24 PROCEDURE — G0299 HHS/HOSPICE OF RN EA 15 MIN: HCPCS

## 2024-12-25 ENCOUNTER — HOME CARE VISIT (OUTPATIENT)
Dept: HOME HEALTH SERVICES | Facility: HOME HEALTHCARE | Age: 64
End: 2024-12-25
Payer: MEDICARE

## 2024-12-25 PROCEDURE — G0299 HHS/HOSPICE OF RN EA 15 MIN: HCPCS

## 2024-12-26 ENCOUNTER — HOME CARE VISIT (OUTPATIENT)
Dept: HOME HEALTH SERVICES | Facility: HOME HEALTHCARE | Age: 64
End: 2024-12-26
Payer: MEDICARE

## 2024-12-26 VITALS
TEMPERATURE: 97.5 F | HEART RATE: 78 BPM | DIASTOLIC BLOOD PRESSURE: 60 MMHG | OXYGEN SATURATION: 97 % | RESPIRATION RATE: 18 BRPM | SYSTOLIC BLOOD PRESSURE: 120 MMHG

## 2024-12-26 DIAGNOSIS — F11.20 CONTINUOUS OPIOID DEPENDENCE (HCC): ICD-10-CM

## 2024-12-26 DIAGNOSIS — G89.29 CHRONIC LOW BACK PAIN WITHOUT SCIATICA, UNSPECIFIED BACK PAIN LATERALITY: ICD-10-CM

## 2024-12-26 DIAGNOSIS — M86.68 OTHER CHRONIC OSTEOMYELITIS, OTHER SITE (HCC): ICD-10-CM

## 2024-12-26 DIAGNOSIS — M54.50 CHRONIC LOW BACK PAIN WITHOUT SCIATICA, UNSPECIFIED BACK PAIN LATERALITY: ICD-10-CM

## 2024-12-26 PROCEDURE — G0299 HHS/HOSPICE OF RN EA 15 MIN: HCPCS

## 2024-12-27 ENCOUNTER — HOME CARE VISIT (OUTPATIENT)
Dept: HOME HEALTH SERVICES | Facility: HOME HEALTHCARE | Age: 64
End: 2024-12-27
Payer: MEDICARE

## 2024-12-27 VITALS
TEMPERATURE: 97.6 F | HEART RATE: 74 BPM | DIASTOLIC BLOOD PRESSURE: 60 MMHG | SYSTOLIC BLOOD PRESSURE: 110 MMHG | OXYGEN SATURATION: 97 % | RESPIRATION RATE: 18 BRPM

## 2024-12-27 PROCEDURE — G0299 HHS/HOSPICE OF RN EA 15 MIN: HCPCS

## 2024-12-28 ENCOUNTER — HOME CARE VISIT (OUTPATIENT)
Dept: HOME HEALTH SERVICES | Facility: HOME HEALTHCARE | Age: 64
End: 2024-12-28
Payer: MEDICARE

## 2024-12-28 VITALS
OXYGEN SATURATION: 99 % | SYSTOLIC BLOOD PRESSURE: 102 MMHG | TEMPERATURE: 97.6 F | DIASTOLIC BLOOD PRESSURE: 80 MMHG | RESPIRATION RATE: 18 BRPM | HEART RATE: 57 BPM

## 2024-12-28 PROCEDURE — G0299 HHS/HOSPICE OF RN EA 15 MIN: HCPCS

## 2024-12-28 NOTE — PROGRESS NOTES
[facility]   Plastic and Reconstructive Surgery   [address] [place] [address]     HISTORY & PHYSICAL        History obtained with use of  Tessie # [id number]  Assessment & Plan  Decubitus ulcer of ischium, left, stage IV (HCC)  Rikki is a 65 y/o male with a history of paraplegia who presents today to discuss management of a left ischial pressure ulcer. Patient is wheel chair bound. Per patient records he has had this reconstructed twice in 2017 with local advancement flaps   Patient notes that his current wound has been present for approximately 5 years and is improving with wound cares. He currently has padding for his wheel chair and shower chair at home.  He does not have a pressure offloading bed at home. He notes that he shifts positions every 1-2 hours.  He has a suprapubic urinary catheter and a colostomy for stool management.     Given that we do not have a meg lift at the office I was unable to examine this patient today.  Discussed with the patient that we will arrange for him to be seen in wound clinic where I can assess his wound and get a better understanding of what we are dealing with.  Additionally, it was difficult to obtain a complete history given difficulties with our . I would like to see him again to further discuss his health history.    Discussed with the patient that once I can assess his wounds I will have a better understanding of his condition and possible assistance I can offer.          History of Present Illness   Rikki Arguello is a 64 y.o. male with a history of paraplegia who presents with a chronic left ischial pressure ulcer.  Patient notes that he first became wheelchair bound in 2000.  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  He notes that his lower extremity strength worsened after each surgery with him finally losing the use of his legs after the third surgery.  Patient notes that he first  developed a pressure sore on the left side in 2014 that improved with wound care.  At some point he developed a wound of the left hip and underwent hip disarticulation in what sounds like a filet of leg flap for soft tissue coverage.  In 2017 he underwent V-Y advancement for reconstruction of left ischial pressure wound, then underwent readvancement later that year.  Patient notes that his right leg was amputated secondary to infection.    He notes that his current wound began approximately 5 years ago and is improving with packing and wound care.    He currently has an ostomy for fecal management.  He has a suprapubic urinary catheter.  He has cushioning of his wheel chair seat and his shower seat at home.  He does not have a low air loss mattress at home.     Review of Systems   Constitutional:  Negative for chills, fatigue and fever.   HENT:  Negative for sore throat.    Respiratory:  Negative for chest tightness and shortness of breath.    Cardiovascular:  Negative for chest pain.   Gastrointestinal:  Negative for abdominal pain.   Skin:         Notes left ischial pressure ulcer   Neurological:  Negative for light-headedness and headaches.   Psychiatric/Behavioral:  Negative for confusion.        Past Medical History:   Diagnosis Date    Anemia     Blind     r eye    Chronic cystitis     Colostomy in place ([facility])     Detrusor sphincter dyssynergia     Diabetes mellitus ([facility])     Poorly controlled type 2; Last Assessed:  3/18/14    Erectile dysfunction     Frequency of urination     GERD (gastroesophageal reflux disease)     History of diabetes mellitus     History of osteomyelitis     Hx of leg amputation ([facility])     r high upper leg    Hyperlipidemia     Hypertension     Hypospadias     Incomplete bladder emptying     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([facility]) 9/16/2019    Neurogenic bladder     Paralysis ([facility])     Paraplegia ([facility])     Spinal cord cysts     Ulcer of sacral region ([facility])     Urge incontinence         Past  "Surgical History:   Procedure Laterality Date    AMPUTATION      At hip; Last Assessed:  1/19/16    BLADDER SURGERY      BONE RESECTION, RIB Right 1/25/2024    Procedure: RESECTION RIB R CHEST WALL RESECTION/RIB RESECTION/RECONSTRUCTION;  Surgeon: Srikanth Maradiaga MD;  [facility];  Service: Thoracic    COLON SURGERY      llq ostomy pouch    COLOSTOMY      COMPLEX CYSTOMETROGRAM  2014    CT CYSTOGRAM  9/19/2019    CYSTOSCOPY  2014    FL CYSTOGRAM  1/5/2015    FL CYSTOGRAM  11/4/2014    FL CYSTOGRAM  10/24/2014    IR PICC REPOSITION  9/23/2019    IR SUPRAPUBIC CATHETER PLACEMENT  9/19/2019    LEG AMPUTATION      MEATOTOMY      PENILE PROSTHESIS  REMOVAL N/A 11/1/2019    Procedure: REMOVAL PROSTHESIS PENILE;  Surgeon: Cuong Phillips MD;  [facility];  Service: Urology    PENILE PROSTHESIS IMPLANT  2011    IA ADJT/REARRGMT SCALP/ARM/LEG 10.1-30.0 SQ CM Left 5/1/2017    Procedure: POSTERIOR THIGH V-Y ADMANCEMENT;  Surgeon: Gal Cardozo MD;  [facility];  Service: Plastics    IA MUSC MYOCUTANEOUS/FASCIOCUTANEOUS FLAP TRUNK Left 5/1/2017    Procedure: FLAP CLOSURE LEFT ISCHIAL WOUND and \"RIGHT\" ISCHIAL FLAP ADVANCEMENT * DEBRIDEMENT, VAC PLACEMENT ;  Surgeon: Gal Cardozo MD;  [facility];  Service: Plastics    IA MUSC MYOCUTANEOUS/FASCIOCUTANEOUS FLAP TRUNK Left 9/27/2017    Procedure: gluteal myocutaneous rotational flap, posterior thigh v to y advancement- wound 5 x 2.5 x 8;  Surgeon: Gal Cardozo MD;  [facility];  Service: Plastics    IA MUSC MYOCUTANEOUS/FASCIOCUTANEOUS FLAP TRUNK N/A 1/25/2024    Procedure: latissimus flap;  Surgeon: Alessandra Bell MD;  [facility];  Service: Plastics    SPINE SURGERY      Lower back    UROFLOWMETRY SIMPLE / COMPLEX  2014       Current Outpatient Medications on File Prior to Visit   Medication Sig Dispense Refill    Accu-Chek FastClix Lancets MISC Inject under the skin daily 100 each 0    acetaminophen (TYLENOL) 325 " mg tablet Take 2 tablets (650 mg total) by mouth every 6 (six) hours as needed for mild pain 40 tablet 0    aspirin (RA Aspirin EC) 81 mg EC tablet Take 1 tablet (81 mg total) by mouth daily 90 tablet 0    Blood Glucose Monitoring Suppl (ONE TOUCH ULTRA 2) w/Device KIT Use daily (Patient not taking: Reported on 7/3/2024) 100 kit 0    Disposable Gloves (LATEX GLOVES LARGE) MISC Use as needed up to 3 times a day dispo 2 boxes (Patient not taking: Reported on 7/3/2024) 2 each 11    gabapentin (NEURONTIN) 400 mg capsule Take 1 capsule (400 mg total) by mouth 3 (three) times a day for 10 days 30 capsule 0    glucose blood (Accu-Chek Guide) test strip Use 1 each daily Use as instructed 100 each 0    ibuprofen (MOTRIN) 800 mg tablet Take 1 tablet (800 mg total) by mouth every 8 (eight) hours as needed for mild pain 60 tablet 0    Incontinence Supply Disposable (SUNBEAM INCONTINENT UNDER PAD) MISC Use 1 per week, dispense 4 reusable underpads (Patient not taking: Reported on 7/3/2024) 4 each 11    Incontinence Supply Disposable MISC by Does not apply route 2 (two) times a day (Patient not taking: Reported on 7/3/2024) 60 each 11    lisinopril (ZESTRIL) 2.5 mg tablet Take 1 tablet (2.5 mg total) by mouth daily 30 tablet 0    naloxone (NARCAN) 4 mg/0.1 mL nasal spray Administer 1 spray into a nostril. If no response after 2-3 minutes, give another dose in the other nostril using a new spray. 1 each 0    omeprazole (PriLOSEC) 20 mg delayed release capsule Take 1 capsule (20 mg total) by mouth daily before breakfast 90 capsule 0    oxyCODONE (ROXICODONE) 15 mg immediate release tablet Take 1 tablet (15 mg total) by mouth every 6 (six) hours as needed for severe pain Max Daily Amount: 60 mg 120 tablet 0     No current facility-administered medications on file prior to visit.       No Known Allergies    Social History     Socioeconomic History    Marital status: /Civil Union     Spouse name: Not on file    Number of  children: Not on file    Years of education: Not on file    Highest education level: Not on file   Occupational History    Not on file   Tobacco Use    Smoking status: Former     Current packs/day: 0.00     Average packs/day: 0.5 packs/day for 10.0 years (5.0 ttl pk-yrs)     Types: Cigarettes     Start date:      Quit date:      Years since quittin.0    Smokeless tobacco: Never    Tobacco comments:     Onset date:  11/10/17   Vaping Use    Vaping status: Never Used   Substance and Sexual Activity    Alcohol use: Yes     Comment: Per [employer]:  Social drinker (Onset date:  11/10/17)    Drug use: No    Sexual activity: Not Currently     Partners: Female   Other Topics Concern    Not on file   Social History Narrative    Native language Faroese    Denominational    Social history reviewed, unchanged     Social Drivers of Health     Financial Resource Strain: Low Risk  (2024)    Overall Financial Resource Strain (CARDIA)     Difficulty of Paying Living Expenses: Not hard at all   Food Insecurity: No Food Insecurity (2024)    Nursing - Inadequate Food Risk Classification     Worried About Running Out of Food in the Last Year: Never true     Ran Out of Food in the Last Year: Never true     Ran Out of Food in the Last Year: Not on file   Transportation Needs: No Transportation Needs (2024)    PRAPARE - Transportation     Lack of Transportation (Medical): No     Lack of Transportation (Non-Medical): No   Physical Activity: Not on file   Stress: Not on file   Social Connections: Not on file   Intimate Partner Violence: Not on file   Housing Stability: Unknown (2024)    Housing Stability Vital Sign     Unable to Pay for Housing in the Last Year: No     Number of Times Moved in the Last Year: Not on file     Homeless in the Last Year: No         Unable to fully examine patient as we do not have a meg lift in the clinic.   Physical Exam  Constitutional:       Appearance: Normal appearance.    HENT:      Head: Normocephalic and atraumatic.   Neurological:      Mental Status: He is alert.     Remainder of exam deferred.

## 2024-12-29 ENCOUNTER — HOME CARE VISIT (OUTPATIENT)
Dept: HOME HEALTH SERVICES | Facility: HOME HEALTHCARE | Age: 64
End: 2024-12-29
Payer: MEDICARE

## 2024-12-29 VITALS
RESPIRATION RATE: 18 BRPM | SYSTOLIC BLOOD PRESSURE: 106 MMHG | HEART RATE: 72 BPM | TEMPERATURE: 97.8 F | OXYGEN SATURATION: 100 % | DIASTOLIC BLOOD PRESSURE: 70 MMHG

## 2024-12-29 PROCEDURE — G0299 HHS/HOSPICE OF RN EA 15 MIN: HCPCS

## 2024-12-29 NOTE — ASSESSMENT & PLAN NOTE
Rikki is a 63 y/o male with a history of paraplegia who presents today to discuss management of a left ischial pressure ulcer. Patient is wheel chair bound. Per patient records he has had this reconstructed twice in 2017 with local advancement flaps   Patient notes that his current wound has been present for approximately 5 years and is improving with wound cares. He currently has padding for his wheel chair and shower chair at home.  He does not have a pressure offloading bed at home. He notes that he shifts positions every 1-2 hours.  He has a suprapubic urinary catheter and a colostomy for stool management.     Given that we do not have a meg lift at the office I was unable to examine this patient today.  Discussed with the patient that we will arrange for him to be seen in wound clinic where I can assess his wound and get a better understanding of what we are dealing with.  Additionally, it was difficult to obtain a complete history given difficulties with our . I would like to see him again to further discuss his health history.    Discussed with the patient that once I can assess his wounds I will have a better understanding of his condition and possible assistance I can offer.

## 2024-12-30 ENCOUNTER — HOME CARE VISIT (OUTPATIENT)
Dept: HOME HEALTH SERVICES | Facility: HOME HEALTHCARE | Age: 64
End: 2024-12-30
Payer: MEDICARE

## 2024-12-30 VITALS
RESPIRATION RATE: 20 BRPM | SYSTOLIC BLOOD PRESSURE: 118 MMHG | DIASTOLIC BLOOD PRESSURE: 70 MMHG | TEMPERATURE: 97.5 F | OXYGEN SATURATION: 100 % | HEART RATE: 68 BPM

## 2024-12-30 VITALS
OXYGEN SATURATION: 98 % | TEMPERATURE: 97.5 F | DIASTOLIC BLOOD PRESSURE: 60 MMHG | HEART RATE: 84 BPM | SYSTOLIC BLOOD PRESSURE: 112 MMHG | RESPIRATION RATE: 18 BRPM

## 2024-12-30 VITALS
HEART RATE: 82 BPM | TEMPERATURE: 97.6 F | DIASTOLIC BLOOD PRESSURE: 70 MMHG | SYSTOLIC BLOOD PRESSURE: 110 MMHG | OXYGEN SATURATION: 98 % | RESPIRATION RATE: 18 BRPM

## 2024-12-30 DIAGNOSIS — R80.9 MICROALBUMINURIA: ICD-10-CM

## 2024-12-30 DIAGNOSIS — E11.9 TYPE 2 DIABETES MELLITUS WITHOUT COMPLICATION, WITHOUT LONG-TERM CURRENT USE OF INSULIN (HCC): ICD-10-CM

## 2024-12-30 PROCEDURE — G0299 HHS/HOSPICE OF RN EA 15 MIN: HCPCS

## 2024-12-31 ENCOUNTER — HOME CARE VISIT (OUTPATIENT)
Dept: HOME HEALTH SERVICES | Facility: HOME HEALTHCARE | Age: 64
End: 2024-12-31
Payer: MEDICARE

## 2024-12-31 PROCEDURE — G0299 HHS/HOSPICE OF RN EA 15 MIN: HCPCS

## 2024-12-31 RX ORDER — LISINOPRIL 2.5 MG/1
2.5 TABLET ORAL DAILY
Qty: 90 TABLET | Refills: 0 | Status: SHIPPED | OUTPATIENT
Start: 2024-12-31

## 2024-12-31 RX ORDER — OXYCODONE HYDROCHLORIDE 15 MG/1
15 TABLET ORAL EVERY 6 HOURS PRN
Qty: 120 TABLET | Refills: 0 | Status: SHIPPED | OUTPATIENT
Start: 2024-12-31

## 2025-01-01 ENCOUNTER — HOME CARE VISIT (OUTPATIENT)
Dept: HOME HEALTH SERVICES | Facility: HOME HEALTHCARE | Age: 65
End: 2025-01-01
Payer: MEDICARE

## 2025-01-01 PROCEDURE — G0299 HHS/HOSPICE OF RN EA 15 MIN: HCPCS

## 2025-01-02 ENCOUNTER — HOME CARE VISIT (OUTPATIENT)
Dept: HOME HEALTH SERVICES | Facility: HOME HEALTHCARE | Age: 65
End: 2025-01-02
Payer: MEDICARE

## 2025-01-02 VITALS
RESPIRATION RATE: 18 BRPM | HEART RATE: 82 BPM | TEMPERATURE: 97.3 F | DIASTOLIC BLOOD PRESSURE: 62 MMHG | OXYGEN SATURATION: 97 % | SYSTOLIC BLOOD PRESSURE: 118 MMHG

## 2025-01-02 VITALS
HEART RATE: 76 BPM | SYSTOLIC BLOOD PRESSURE: 110 MMHG | OXYGEN SATURATION: 97 % | TEMPERATURE: 97.4 F | DIASTOLIC BLOOD PRESSURE: 60 MMHG | RESPIRATION RATE: 18 BRPM

## 2025-01-02 DIAGNOSIS — M54.50 CHRONIC LOW BACK PAIN WITHOUT SCIATICA, UNSPECIFIED BACK PAIN LATERALITY: ICD-10-CM

## 2025-01-02 DIAGNOSIS — G89.29 CHRONIC LOW BACK PAIN WITHOUT SCIATICA, UNSPECIFIED BACK PAIN LATERALITY: ICD-10-CM

## 2025-01-02 DIAGNOSIS — K21.9 GASTROESOPHAGEAL REFLUX DISEASE WITHOUT ESOPHAGITIS: ICD-10-CM

## 2025-01-02 DIAGNOSIS — M86.68 OTHER CHRONIC OSTEOMYELITIS, OTHER SITE (HCC): ICD-10-CM

## 2025-01-02 PROCEDURE — G0299 HHS/HOSPICE OF RN EA 15 MIN: HCPCS

## 2025-01-02 RX ORDER — ACETAMINOPHEN 325 MG/1
650 TABLET ORAL EVERY 6 HOURS PRN
Qty: 40 TABLET | Refills: 0 | Status: SHIPPED | OUTPATIENT
Start: 2025-01-02

## 2025-01-02 RX ORDER — IBUPROFEN 800 MG/1
800 TABLET, FILM COATED ORAL EVERY 8 HOURS PRN
Qty: 60 TABLET | Refills: 0 | Status: SHIPPED | OUTPATIENT
Start: 2025-01-02

## 2025-01-02 RX ORDER — ASPIRIN 81 MG/1
81 TABLET ORAL DAILY
Qty: 90 TABLET | Refills: 0 | OUTPATIENT
Start: 2025-01-02

## 2025-01-03 ENCOUNTER — HOME CARE VISIT (OUTPATIENT)
Dept: HOME HEALTH SERVICES | Facility: HOME HEALTHCARE | Age: 65
End: 2025-01-03
Payer: MEDICARE

## 2025-01-03 VITALS — TEMPERATURE: 97.3 F | DIASTOLIC BLOOD PRESSURE: 72 MMHG | RESPIRATION RATE: 16 BRPM | SYSTOLIC BLOOD PRESSURE: 142 MMHG

## 2025-01-03 VITALS
TEMPERATURE: 98.2 F | RESPIRATION RATE: 16 BRPM | SYSTOLIC BLOOD PRESSURE: 138 MMHG | OXYGEN SATURATION: 100 % | HEART RATE: 59 BPM | DIASTOLIC BLOOD PRESSURE: 74 MMHG

## 2025-01-03 PROCEDURE — G0299 HHS/HOSPICE OF RN EA 15 MIN: HCPCS

## 2025-01-04 ENCOUNTER — HOME CARE VISIT (OUTPATIENT)
Dept: HOME HEALTH SERVICES | Facility: HOME HEALTHCARE | Age: 65
End: 2025-01-04
Payer: MEDICARE

## 2025-01-04 VITALS
OXYGEN SATURATION: 99 % | RESPIRATION RATE: 18 BRPM | TEMPERATURE: 97.8 F | HEART RATE: 73 BPM | SYSTOLIC BLOOD PRESSURE: 122 MMHG | DIASTOLIC BLOOD PRESSURE: 80 MMHG

## 2025-01-04 PROCEDURE — G0299 HHS/HOSPICE OF RN EA 15 MIN: HCPCS

## 2025-01-05 ENCOUNTER — HOME CARE VISIT (OUTPATIENT)
Dept: HOME HEALTH SERVICES | Facility: HOME HEALTHCARE | Age: 65
End: 2025-01-05
Payer: MEDICARE

## 2025-01-05 VITALS
RESPIRATION RATE: 18 BRPM | SYSTOLIC BLOOD PRESSURE: 128 MMHG | HEART RATE: 78 BPM | DIASTOLIC BLOOD PRESSURE: 82 MMHG | TEMPERATURE: 97.6 F | OXYGEN SATURATION: 100 %

## 2025-01-05 PROCEDURE — G0299 HHS/HOSPICE OF RN EA 15 MIN: HCPCS

## 2025-01-06 ENCOUNTER — HOME CARE VISIT (OUTPATIENT)
Dept: HOME HEALTH SERVICES | Facility: HOME HEALTHCARE | Age: 65
End: 2025-01-06
Payer: MEDICARE

## 2025-01-06 VITALS
TEMPERATURE: 96.6 F | DIASTOLIC BLOOD PRESSURE: 58 MMHG | HEART RATE: 76 BPM | SYSTOLIC BLOOD PRESSURE: 96 MMHG | OXYGEN SATURATION: 98 % | RESPIRATION RATE: 20 BRPM

## 2025-01-06 PROCEDURE — G0299 HHS/HOSPICE OF RN EA 15 MIN: HCPCS

## 2025-01-06 NOTE — CASE COMMUNICATION
Ship to   Pt. Home  XX      Ordering MD Dr. Yan    Wound 1 - Sacrum  -    Full  XX   -  Frequency - Daily  Wound 2  -    L hip -  Partial XX   -  Frequency  - 3x week             Dry Dressings   Gauze 4x4 N/S 200 4x4s per unit  [id number] ....1       Tape  [facility]. [facility] [id number] ....1 box      Sub for Mepilex:  Silicone adhesive foam 3x3 border NOT STOCKED [id number]...  20

## 2025-01-07 ENCOUNTER — HOME CARE VISIT (OUTPATIENT)
Dept: HOME HEALTH SERVICES | Facility: HOME HEALTHCARE | Age: 65
End: 2025-01-07
Payer: MEDICARE

## 2025-01-07 PROCEDURE — G0299 HHS/HOSPICE OF RN EA 15 MIN: HCPCS

## 2025-01-08 ENCOUNTER — HOME CARE VISIT (OUTPATIENT)
Dept: HOME HEALTH SERVICES | Facility: HOME HEALTHCARE | Age: 65
End: 2025-01-08
Payer: MEDICARE

## 2025-01-08 PROCEDURE — G0299 HHS/HOSPICE OF RN EA 15 MIN: HCPCS

## 2025-01-09 ENCOUNTER — HOME CARE VISIT (OUTPATIENT)
Dept: HOME HEALTH SERVICES | Facility: HOME HEALTHCARE | Age: 65
End: 2025-01-09
Payer: MEDICARE

## 2025-01-09 ENCOUNTER — TELEPHONE (OUTPATIENT)
Dept: FAMILY MEDICINE CLINIC | Facility: CLINIC | Age: 65
End: 2025-01-09

## 2025-01-09 VITALS
TEMPERATURE: 97.5 F | OXYGEN SATURATION: 97 % | SYSTOLIC BLOOD PRESSURE: 130 MMHG | RESPIRATION RATE: 18 BRPM | HEART RATE: 76 BPM | DIASTOLIC BLOOD PRESSURE: 80 MMHG

## 2025-01-09 VITALS
OXYGEN SATURATION: 97 % | SYSTOLIC BLOOD PRESSURE: 120 MMHG | HEART RATE: 67 BPM | DIASTOLIC BLOOD PRESSURE: 80 MMHG | TEMPERATURE: 97.2 F | RESPIRATION RATE: 18 BRPM

## 2025-01-09 PROCEDURE — G0299 HHS/HOSPICE OF RN EA 15 MIN: HCPCS

## 2025-01-10 ENCOUNTER — HOME CARE VISIT (OUTPATIENT)
Dept: HOME HEALTH SERVICES | Facility: HOME HEALTHCARE | Age: 65
End: 2025-01-10
Payer: MEDICARE

## 2025-01-10 DIAGNOSIS — L89.324 STAGE IV PRESSURE ULCER OF LEFT BUTTOCK (HCC): ICD-10-CM

## 2025-01-10 DIAGNOSIS — M86.68 OTHER CHRONIC OSTEOMYELITIS, OTHER SITE (HCC): Primary | ICD-10-CM

## 2025-01-10 PROCEDURE — G0299 HHS/HOSPICE OF RN EA 15 MIN: HCPCS

## 2025-01-11 ENCOUNTER — HOME CARE VISIT (OUTPATIENT)
Dept: HOME HEALTH SERVICES | Facility: HOME HEALTHCARE | Age: 65
End: 2025-01-11
Payer: MEDICARE

## 2025-01-11 VITALS
TEMPERATURE: 97.3 F | OXYGEN SATURATION: 98 % | HEART RATE: 68 BPM | SYSTOLIC BLOOD PRESSURE: 130 MMHG | DIASTOLIC BLOOD PRESSURE: 82 MMHG | RESPIRATION RATE: 16 BRPM

## 2025-01-11 PROCEDURE — G0300 HHS/HOSPICE OF LPN EA 15 MIN: HCPCS

## 2025-01-12 ENCOUNTER — HOME CARE VISIT (OUTPATIENT)
Dept: HOME HEALTH SERVICES | Facility: HOME HEALTHCARE | Age: 65
End: 2025-01-12
Payer: MEDICARE

## 2025-01-12 VITALS
TEMPERATURE: 99 F | HEART RATE: 73 BPM | SYSTOLIC BLOOD PRESSURE: 138 MMHG | RESPIRATION RATE: 15 BRPM | DIASTOLIC BLOOD PRESSURE: 88 MMHG | OXYGEN SATURATION: 98 %

## 2025-01-12 PROCEDURE — G0299 HHS/HOSPICE OF RN EA 15 MIN: HCPCS

## 2025-01-12 NOTE — PROGRESS NOTES
UROLOGY PROGRESS NOTE   [facility]  [address] [place] [address] [place]  [address]  [place] [address]  [phone]  Fax:[phone]  [email or web address]      NAME: Rikki Arguello  AGE: 65 y.o. SEX: male  : 1960   MRN: [id number]    DATE: 2025  TIME: 8:56 AM    Assessment and Plan:    Neurogenic bladder due to spinal cord injury  with urinary retention status post augmentation with appendicovesicostomy now with SP tube-he would like the SP tube removed and go back and straight cath.  Will set him up for cystoscopy and if everything looks okay from the urethra standpoint, we can remove the SP tube at that time and have him start doing straight cath again.  No recent upper tract imaging, so we will get a kidney bladder ultrasound prior to cystoscopy.    Elevated PSA-stable and hemoglobin lower than 2019.  Has a large prostate on CT scan.               Chief Complaint     Chief Complaint   Patient presents with    Elevated PSA       History of Present Illness   Follow-up neurogenic bladder due to spinal cord injury and paraplegia with a history of bladder augmentation and appendiceal vesicostomy by me years ago, and history of penile prosthesis placement which had to be explanted.  Now managed with an indwelling suprapubic tube changed monthly.  I last saw him by telemedicine 2023.  He was set up for cystoscopy to see if he would go back to straight catheterization at that point.  That never happened.  He was seen in consultation by KALPANA Bryant 2024 when hospitalized for nonhealing right lateral chest wall wound.  He would like to go back on straight catheterization because the bag on his knee causes open wounds.  Still has the SP tube.  No recent hospitalizations.  No recent UTI.No gross hematuria.      Elevated PSA-he has undergone prostate biopsy by Dr. Phillips in the past.  Basically it is stable from 2019.  Component  Ref Range & Units (hoanali) 24  5:21  PM 3/2/23 11:32 AM 3/10/22 12:01 PM 2/11/21  9:50 AM 6/2/20  9:32 AM 12/5/19  9:26 AM   PSA, Diagnostic 4.350 High  4.5 High  R, CM 3.2 R, CM 3.4 R, CM 4.4 High  R, CM 4.4 High  R, CM     The following portions of the patient's history were reviewed and updated as appropriate: allergies, current medications, past family history, past medical history, past social history, past surgical history and problem list.  Past Medical History:   Diagnosis Date    Anemia     Blind     r eye    Chronic cystitis     Colostomy in place (HCC)     Detrusor sphincter dyssynergia     Diabetes mellitus ([facility])     Poorly controlled type 2; Last Assessed:  3/18/14    Erectile dysfunction     Frequency of urination     GERD (gastroesophageal reflux disease)     History of diabetes mellitus     History of osteomyelitis     Hx of leg amputation (HCC)     r high upper leg    Hyperlipidemia     Hypertension     Hypospadias     Incomplete bladder emptying     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(HCC) 9/16/2019    Neurogenic bladder     Paralysis (HCC)     Paraplegia ([facility])     Spinal cord cysts     Ulcer of sacral region ([facility])     Urge incontinence      Past Surgical History:   Procedure Laterality Date    AMPUTATION      At hip; Last Assessed:  1/19/16    BLADDER SURGERY      BONE RESECTION, RIB Right 1/25/2024    Procedure: RESECTION RIB R CHEST WALL RESECTION/RIB RESECTION/RECONSTRUCTION;  Surgeon: Srikanth Maradiaga MD;  Location:  MAIN OR;  Service: Thoracic    COLON SURGERY      llq ostomy pouch    COLOSTOMY      COMPLEX CYSTOMETROGRAM  2014    CT CYSTOGRAM  9/19/2019    CYSTOSCOPY  2014    FL CYSTOGRAM  1/5/2015    FL CYSTOGRAM  11/4/2014    FL CYSTOGRAM  10/24/2014    IR PICC REPOSITION  9/23/2019    IR SUPRAPUBIC CATHETER PLACEMENT  9/19/2019    LEG AMPUTATION      MEATOTOMY      PENILE PROSTHESIS  REMOVAL N/A 11/1/2019    Procedure: REMOVAL PROSTHESIS PENILE;  Surgeon: Cuong Phillips MD;  [facility];  Service: Urology     "PENILE PROSTHESIS IMPLANT  2011    WA ADJT/REARRGMT SCALP/ARM/LEG 10.1-30.0 SQ CM Left 5/1/2017    Procedure: POSTERIOR THIGH V-Y ADMANCEMENT;  Surgeon: Gal Cardozo MD;  [facility];  Service: Plastics    WA MUSC MYOCUTANEOUS/FASCIOCUTANEOUS FLAP TRUNK Left 5/1/2017    Procedure: FLAP CLOSURE LEFT ISCHIAL WOUND and \"RIGHT\" ISCHIAL FLAP ADVANCEMENT * DEBRIDEMENT, VAC PLACEMENT ;  Surgeon: Gal Cardozo MD;  [facility];  Service: Plastics    WA MUSC MYOCUTANEOUS/FASCIOCUTANEOUS FLAP TRUNK Left 9/27/2017    Procedure: gluteal myocutaneous rotational flap, posterior thigh v to y advancement- wound 5 x 2.5 x 8;  Surgeon: Gal Cardozo MD;  [facility];  Service: Plastics    WA MUSC MYOCUTANEOUS/FASCIOCUTANEOUS FLAP TRUNK N/A 1/25/2024    Procedure: latissimus flap;  Surgeon: Alessandra Bell MD;  [facility];  Service: Plastics    SPINE SURGERY      Lower back    UROFLOWMETRY SIMPLE / COMPLEX  2014       Review of Systems   Review of Systems   Constitutional:  Negative for fever.   Respiratory:  Negative for shortness of breath.    Cardiovascular:  Negative for chest pain.   Gastrointestinal:         Has colostomy   Genitourinary:         As per hpi       Active Problem List     Patient Active Problem List   Diagnosis    Stage IV pressure ulcer of sacral region (HCC)    Cyst of joint of shoulder    Anemia    Paraplegic spinal paralysis (HCC)    Sinus tachycardia    GERD (gastroesophageal reflux disease)    History of osteomyelitis    Anxiety    Amputated right leg (HCC)    Benign essential hypertension    Diarrhea    Decubitus ulcer of ischium, left, stage IV (HCC)    Continuous opioid dependence (HCC)    Colostomy in place (HCC)    Dyspepsia    Epigastric pain    Osteomyelitis of pelvic region (HCC)    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HCC)    Suprapubic catheter (HCC)    History of Clostridium difficile colitis    Left perineal ischial pressure ulcer, stage IV (HCC)    SBO (small bowel " obstruction) (HCC)    Renal cyst    Other male erectile dysfunction    Type 2 diabetes mellitus without complication, without long-term current use of insulin (HCC)    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)    Stage IV pressure ulcer of right lower back (HCC)    Heme positive stool    Foul smelling urine    Subacute osteomyelitis, other site (HCC)    Chest wall ulcer (HCC)    PICC (peripherally inserted central catheter) in place    Elevated prostate specific antigen (PSA)    Other chronic osteomyelitis, other site (HCC)    Stage IV pressure ulcer of left buttock (HCC)       Objective   /80 (BP Location: Left arm, Patient Position: Sitting, Cuff Size: Adult)   Pulse 76   Temp 97.7 °F (36.5 °C) (Temporal)   Resp 16   SpO2 97%     Physical Exam  Vitals reviewed.   Constitutional:       Appearance: Normal appearance. He is normal weight.   HENT:      Head: Normocephalic and atraumatic.   Eyes:      Extraocular Movements: Extraocular movements intact.   Pulmonary:      Effort: Pulmonary effort is normal.   Musculoskeletal:         General: Normal range of motion.      Cervical back: Normal range of motion.   Skin:     Coloration: Skin is not jaundiced or pale.   Neurological:      General: No focal deficit present.      Mental Status: He is alert and oriented to person, place, and time.      Comments: Paraplegic in wheelchair   Psychiatric:         Mood and Affect: Mood normal.         Behavior: Behavior normal.         Thought Content: Thought content normal.         Judgment: Judgment normal.             Current Medications     Current Outpatient Medications:     Accu-Chek FastClix Lancets MISC, Inject under the skin daily, Disp: 100 each, Rfl: 0    acetaminophen (TYLENOL) 325 mg tablet, Take 2 tablets (650 mg total) by mouth every 6 (six) hours as needed for mild pain, Disp: 40 tablet, Rfl: 0    aspirin (RA Aspirin EC) 81 mg EC tablet, Take 1 tablet (81 mg total) by mouth daily, Disp: 90 tablet, Rfl:  0    glucose blood (Accu-Chek Guide) test strip, Use 1 each daily Use as instructed, Disp: 100 each, Rfl: 0    ibuprofen (MOTRIN) 800 mg tablet, Take 1 tablet (800 mg total) by mouth every 8 (eight) hours as needed for mild pain, Disp: 60 tablet, Rfl: 0    lisinopril (ZESTRIL) 2.5 mg tablet, TAKE 1 TABLET(2.5 MG) BY MOUTH DAILY, Disp: 90 tablet, Rfl: 0    naloxone (NARCAN) 4 mg/0.1 mL nasal spray, Administer 1 spray into a nostril. If no response after 2-3 minutes, give another dose in the other nostril using a new spray., Disp: 1 each, Rfl: 0    omeprazole (PriLOSEC) 20 mg delayed release capsule, Take 1 capsule (20 mg total) by mouth daily before breakfast, Disp: 90 capsule, Rfl: 0    oxyCODONE (ROXICODONE) 15 mg immediate release tablet, Take 1 tablet (15 mg total) by mouth every 6 (six) hours as needed for severe pain Max Daily Amount: 60 mg, Disp: 120 tablet, Rfl: 0    Blood Glucose Monitoring Suppl (ONE TOUCH ULTRA 2) w/Device KIT, Use daily (Patient not taking: Reported on 7/3/2024), Disp: 100 kit, Rfl: 0    Disposable Gloves (LATEX GLOVES LARGE) MISC, Use as needed up to 3 times a day dispo 2 boxes (Patient not taking: Reported on 7/3/2024), Disp: 2 each, Rfl: 11    gabapentin (NEURONTIN) 400 mg capsule, Take 1 capsule (400 mg total) by mouth 3 (three) times a day for 10 days, Disp: 30 capsule, Rfl: 0    Incontinence Supply Disposable (SUNBEAM INCONTINENT UNDER PAD) MISC, Use 1 per week, dispense 4 reusable underpads (Patient not taking: Reported on 7/3/2024), Disp: 4 each, Rfl: 11    Incontinence Supply Disposable MISC, by Does not apply route 2 (two) times a day (Patient not taking: Reported on 7/3/2024), Disp: 60 each, Rfl: 11        Bong García MD

## 2025-01-13 ENCOUNTER — HOME CARE VISIT (OUTPATIENT)
Dept: HOME HEALTH SERVICES | Facility: HOME HEALTHCARE | Age: 65
End: 2025-01-13
Payer: MEDICARE

## 2025-01-13 ENCOUNTER — OFFICE VISIT (OUTPATIENT)
Dept: UROLOGY | Facility: CLINIC | Age: 65
End: 2025-01-13
Payer: MEDICARE

## 2025-01-13 VITALS
RESPIRATION RATE: 18 BRPM | SYSTOLIC BLOOD PRESSURE: 120 MMHG | OXYGEN SATURATION: 96 % | HEART RATE: 78 BPM | DIASTOLIC BLOOD PRESSURE: 60 MMHG | TEMPERATURE: 97.3 F

## 2025-01-13 VITALS
DIASTOLIC BLOOD PRESSURE: 80 MMHG | TEMPERATURE: 97.7 F | SYSTOLIC BLOOD PRESSURE: 124 MMHG | HEART RATE: 76 BPM | OXYGEN SATURATION: 97 % | RESPIRATION RATE: 16 BRPM

## 2025-01-13 VITALS
RESPIRATION RATE: 18 BRPM | SYSTOLIC BLOOD PRESSURE: 110 MMHG | OXYGEN SATURATION: 97 % | TEMPERATURE: 97.5 F | DIASTOLIC BLOOD PRESSURE: 60 MMHG | HEART RATE: 74 BPM

## 2025-01-13 VITALS — RESPIRATION RATE: 18 BRPM

## 2025-01-13 DIAGNOSIS — R97.20 ELEVATED PSA: ICD-10-CM

## 2025-01-13 DIAGNOSIS — R33.9 URINARY RETENTION: ICD-10-CM

## 2025-01-13 DIAGNOSIS — N31.9 NEUROGENIC BLADDER: Primary | ICD-10-CM

## 2025-01-13 PROCEDURE — 99213 OFFICE O/P EST LOW 20 MIN: CPT | Performed by: UROLOGY

## 2025-01-13 PROCEDURE — G0299 HHS/HOSPICE OF RN EA 15 MIN: HCPCS

## 2025-01-14 ENCOUNTER — HOME CARE VISIT (OUTPATIENT)
Dept: HOME HEALTH SERVICES | Facility: HOME HEALTHCARE | Age: 65
End: 2025-01-14
Payer: MEDICARE

## 2025-01-14 ENCOUNTER — TELEPHONE (OUTPATIENT)
Dept: FAMILY MEDICINE CLINIC | Facility: CLINIC | Age: 65
End: 2025-01-14

## 2025-01-14 PROCEDURE — G0299 HHS/HOSPICE OF RN EA 15 MIN: HCPCS

## 2025-01-14 NOTE — TELEPHONE ENCOUNTER
FAXED ON 01/14/25 TO [facility] at [phone]. FAX CONFIRMATION RECEIVED.      [facility] called and asked to have this order faxed to them

## 2025-01-14 NOTE — TELEPHONE ENCOUNTER
Faxed order to fax # provided.  
How Severe Are Your Spot(S)?: mild
Jahaira from [facility] out pt rehab Wheel chair clinic asked about a prescription for wheel chair modifications    She said they requested in December and never got it. Has a history of pressure wounds hip problem and paraplegic.      Wants to get seating cushions with air in it and it will stop wounds on backside    Requesting Fax sent to [phone]  
What Type Of Note Output Would You Prefer (Optional)?: Bullet Format
What Is The Reason For Today's Visit?: Full Body Skin Examination
What Is The Reason For Today's Visit? (Being Monitored For X): concerning skin lesions on an annual basis

## 2025-01-15 ENCOUNTER — HOME CARE VISIT (OUTPATIENT)
Dept: HOME HEALTH SERVICES | Facility: HOME HEALTHCARE | Age: 65
End: 2025-01-15
Payer: MEDICARE

## 2025-01-15 PROCEDURE — G0299 HHS/HOSPICE OF RN EA 15 MIN: HCPCS

## 2025-01-16 ENCOUNTER — HOME CARE VISIT (OUTPATIENT)
Dept: HOME HEALTH SERVICES | Facility: HOME HEALTHCARE | Age: 65
End: 2025-01-16
Payer: MEDICARE

## 2025-01-16 VITALS
OXYGEN SATURATION: 98 % | SYSTOLIC BLOOD PRESSURE: 132 MMHG | TEMPERATURE: 96.9 F | RESPIRATION RATE: 20 BRPM | HEART RATE: 68 BPM | DIASTOLIC BLOOD PRESSURE: 86 MMHG

## 2025-01-16 PROCEDURE — G0300 HHS/HOSPICE OF LPN EA 15 MIN: HCPCS

## 2025-01-17 ENCOUNTER — HOME CARE VISIT (OUTPATIENT)
Dept: HOME HEALTH SERVICES | Facility: HOME HEALTHCARE | Age: 65
End: 2025-01-17
Payer: MEDICARE

## 2025-01-17 ENCOUNTER — HOSPITAL ENCOUNTER (OUTPATIENT)
Dept: ULTRASOUND IMAGING | Facility: MEDICAL CENTER | Age: 65
Discharge: HOME | End: 2025-01-17
Attending: UROLOGY
Payer: MEDICARE

## 2025-01-17 VITALS
OXYGEN SATURATION: 98 % | TEMPERATURE: 98 F | RESPIRATION RATE: 20 BRPM | HEART RATE: 88 BPM | DIASTOLIC BLOOD PRESSURE: 82 MMHG | SYSTOLIC BLOOD PRESSURE: 132 MMHG

## 2025-01-17 VITALS
SYSTOLIC BLOOD PRESSURE: 112 MMHG | RESPIRATION RATE: 16 BRPM | DIASTOLIC BLOOD PRESSURE: 60 MMHG | HEART RATE: 78 BPM | TEMPERATURE: 97.2 F | OXYGEN SATURATION: 97 %

## 2025-01-17 VITALS
SYSTOLIC BLOOD PRESSURE: 120 MMHG | HEART RATE: 88 BPM | RESPIRATION RATE: 18 BRPM | DIASTOLIC BLOOD PRESSURE: 70 MMHG | OXYGEN SATURATION: 98 % | TEMPERATURE: 97.6 F

## 2025-01-17 DIAGNOSIS — N31.9 NEUROGENIC BLADDER: ICD-10-CM

## 2025-01-17 PROCEDURE — 76775 US EXAM ABDO BACK WALL LIM: CPT

## 2025-01-17 PROCEDURE — G0299 HHS/HOSPICE OF RN EA 15 MIN: HCPCS

## 2025-01-18 ENCOUNTER — HOME CARE VISIT (OUTPATIENT)
Dept: HOME HEALTH SERVICES | Facility: HOME HEALTHCARE | Age: 65
End: 2025-01-18
Payer: MEDICARE

## 2025-01-18 VITALS
RESPIRATION RATE: 16 BRPM | DIASTOLIC BLOOD PRESSURE: 82 MMHG | SYSTOLIC BLOOD PRESSURE: 130 MMHG | OXYGEN SATURATION: 98 % | HEART RATE: 70 BPM

## 2025-01-18 PROCEDURE — G0299 HHS/HOSPICE OF RN EA 15 MIN: HCPCS

## 2025-01-19 ENCOUNTER — HOME CARE VISIT (OUTPATIENT)
Dept: HOME HEALTH SERVICES | Facility: HOME HEALTHCARE | Age: 65
End: 2025-01-19
Payer: MEDICARE

## 2025-01-19 VITALS
DIASTOLIC BLOOD PRESSURE: 62 MMHG | TEMPERATURE: 97.7 F | OXYGEN SATURATION: 98 % | RESPIRATION RATE: 20 BRPM | SYSTOLIC BLOOD PRESSURE: 100 MMHG | HEART RATE: 63 BPM

## 2025-01-19 PROCEDURE — G0299 HHS/HOSPICE OF RN EA 15 MIN: HCPCS

## 2025-01-20 ENCOUNTER — HOME CARE VISIT (OUTPATIENT)
Dept: HOME HEALTH SERVICES | Facility: HOME HEALTHCARE | Age: 65
End: 2025-01-20
Payer: MEDICARE

## 2025-01-20 ENCOUNTER — TELEPHONE (OUTPATIENT)
Dept: FAMILY MEDICINE CLINIC | Facility: CLINIC | Age: 65
End: 2025-01-20

## 2025-01-20 PROCEDURE — G0299 HHS/HOSPICE OF RN EA 15 MIN: HCPCS

## 2025-01-20 NOTE — TELEPHONE ENCOUNTER
PCP SIGNATURE NEEDED FOR [employer] FORM RECEIVED VIA FAX AND PLACED IN PCP FOLDER TO BE DELIVERED AT ASSIGNED TIMES.        [employer]

## 2025-01-22 ENCOUNTER — HOME CARE VISIT (OUTPATIENT)
Dept: HOME HEALTH SERVICES | Facility: HOME HEALTHCARE | Age: 65
End: 2025-01-22
Payer: MEDICARE

## 2025-01-22 VITALS
OXYGEN SATURATION: 97 % | TEMPERATURE: 97.4 F | RESPIRATION RATE: 18 BRPM | SYSTOLIC BLOOD PRESSURE: 120 MMHG | HEART RATE: 76 BPM | DIASTOLIC BLOOD PRESSURE: 70 MMHG

## 2025-01-22 PROCEDURE — G0299 HHS/HOSPICE OF RN EA 15 MIN: HCPCS

## 2025-01-24 ENCOUNTER — HOME CARE VISIT (OUTPATIENT)
Dept: HOME HEALTH SERVICES | Facility: HOME HEALTHCARE | Age: 65
End: 2025-01-24
Payer: MEDICARE

## 2025-01-24 DIAGNOSIS — E11.9 TYPE 2 DIABETES MELLITUS WITHOUT COMPLICATION, WITHOUT LONG-TERM CURRENT USE OF INSULIN (HCC): ICD-10-CM

## 2025-01-24 DIAGNOSIS — R80.9 MICROALBUMINURIA: ICD-10-CM

## 2025-01-24 PROCEDURE — G0299 HHS/HOSPICE OF RN EA 15 MIN: HCPCS

## 2025-01-24 RX ORDER — LISINOPRIL 2.5 MG/1
2.5 TABLET ORAL DAILY
Qty: 90 TABLET | Refills: 0 | Status: SHIPPED | OUTPATIENT
Start: 2025-01-24

## 2025-01-27 ENCOUNTER — HOME CARE VISIT (OUTPATIENT)
Dept: HOME HEALTH SERVICES | Facility: HOME HEALTHCARE | Age: 65
End: 2025-01-27
Payer: MEDICARE

## 2025-01-27 VITALS
HEART RATE: 72 BPM | RESPIRATION RATE: 18 BRPM | TEMPERATURE: 98.5 F | DIASTOLIC BLOOD PRESSURE: 84 MMHG | SYSTOLIC BLOOD PRESSURE: 126 MMHG | OXYGEN SATURATION: 97 %

## 2025-01-27 VITALS
RESPIRATION RATE: 18 BRPM | TEMPERATURE: 97.4 F | HEART RATE: 60 BPM | DIASTOLIC BLOOD PRESSURE: 60 MMHG | OXYGEN SATURATION: 97 % | SYSTOLIC BLOOD PRESSURE: 120 MMHG

## 2025-01-27 DIAGNOSIS — F11.20 CONTINUOUS OPIOID DEPENDENCE (HCC): ICD-10-CM

## 2025-01-27 DIAGNOSIS — M86.68 OTHER CHRONIC OSTEOMYELITIS, OTHER SITE (HCC): ICD-10-CM

## 2025-01-27 DIAGNOSIS — M54.50 CHRONIC LOW BACK PAIN WITHOUT SCIATICA, UNSPECIFIED BACK PAIN LATERALITY: ICD-10-CM

## 2025-01-27 DIAGNOSIS — G89.29 CHRONIC LOW BACK PAIN WITHOUT SCIATICA, UNSPECIFIED BACK PAIN LATERALITY: ICD-10-CM

## 2025-01-27 PROCEDURE — G0300 HHS/HOSPICE OF LPN EA 15 MIN: HCPCS

## 2025-01-28 VITALS
DIASTOLIC BLOOD PRESSURE: 68 MMHG | OXYGEN SATURATION: 97 % | RESPIRATION RATE: 18 BRPM | HEART RATE: 76 BPM | TEMPERATURE: 97.4 F | SYSTOLIC BLOOD PRESSURE: 120 MMHG

## 2025-01-29 ENCOUNTER — HOME CARE VISIT (OUTPATIENT)
Dept: HOME HEALTH SERVICES | Facility: HOME HEALTHCARE | Age: 65
End: 2025-01-29
Payer: MEDICARE

## 2025-01-29 PROCEDURE — G0299 HHS/HOSPICE OF RN EA 15 MIN: HCPCS

## 2025-01-29 PROCEDURE — 400014 VN F/U

## 2025-01-30 RX ORDER — OXYCODONE HYDROCHLORIDE 15 MG/1
15 TABLET ORAL EVERY 6 HOURS PRN
Qty: 120 TABLET | Refills: 0 | Status: SHIPPED | OUTPATIENT
Start: 2025-01-30

## 2025-01-31 ENCOUNTER — HOME CARE VISIT (OUTPATIENT)
Dept: HOME HEALTH SERVICES | Facility: HOME HEALTHCARE | Age: 65
End: 2025-01-31
Payer: MEDICARE

## 2025-01-31 PROCEDURE — G0299 HHS/HOSPICE OF RN EA 15 MIN: HCPCS

## 2025-02-02 VITALS
SYSTOLIC BLOOD PRESSURE: 110 MMHG | OXYGEN SATURATION: 97 % | TEMPERATURE: 97.6 F | DIASTOLIC BLOOD PRESSURE: 70 MMHG | HEART RATE: 80 BPM | RESPIRATION RATE: 18 BRPM

## 2025-02-02 VITALS
OXYGEN SATURATION: 98 % | DIASTOLIC BLOOD PRESSURE: 70 MMHG | SYSTOLIC BLOOD PRESSURE: 120 MMHG | RESPIRATION RATE: 18 BRPM | TEMPERATURE: 97.2 F | HEART RATE: 80 BPM

## 2025-02-03 ENCOUNTER — HOME CARE VISIT (OUTPATIENT)
Dept: HOME HEALTH SERVICES | Facility: HOME HEALTHCARE | Age: 65
End: 2025-02-03
Payer: MEDICARE

## 2025-02-03 VITALS
SYSTOLIC BLOOD PRESSURE: 116 MMHG | HEART RATE: 58 BPM | TEMPERATURE: 96.3 F | RESPIRATION RATE: 18 BRPM | DIASTOLIC BLOOD PRESSURE: 84 MMHG | OXYGEN SATURATION: 99 %

## 2025-02-03 PROCEDURE — G0300 HHS/HOSPICE OF LPN EA 15 MIN: HCPCS

## 2025-02-05 ENCOUNTER — HOME CARE VISIT (OUTPATIENT)
Dept: HOME HEALTH SERVICES | Facility: HOME HEALTHCARE | Age: 65
End: 2025-02-05
Payer: MEDICARE

## 2025-02-05 VITALS
TEMPERATURE: 97.3 F | HEART RATE: 60 BPM | RESPIRATION RATE: 20 BRPM | SYSTOLIC BLOOD PRESSURE: 120 MMHG | DIASTOLIC BLOOD PRESSURE: 80 MMHG | OXYGEN SATURATION: 97 %

## 2025-02-05 PROCEDURE — G0299 HHS/HOSPICE OF RN EA 15 MIN: HCPCS

## 2025-02-07 ENCOUNTER — HOME CARE VISIT (OUTPATIENT)
Dept: HOME HEALTH SERVICES | Facility: HOME HEALTHCARE | Age: 65
End: 2025-02-07
Payer: MEDICARE

## 2025-02-07 VITALS
RESPIRATION RATE: 20 BRPM | DIASTOLIC BLOOD PRESSURE: 78 MMHG | TEMPERATURE: 97.9 F | SYSTOLIC BLOOD PRESSURE: 118 MMHG | OXYGEN SATURATION: 96 % | HEART RATE: 62 BPM

## 2025-02-07 PROCEDURE — G0299 HHS/HOSPICE OF RN EA 15 MIN: HCPCS

## 2025-02-10 ENCOUNTER — HOME CARE VISIT (OUTPATIENT)
Dept: HOME HEALTH SERVICES | Facility: HOME HEALTHCARE | Age: 65
End: 2025-02-10
Payer: MEDICARE

## 2025-02-10 VITALS
HEART RATE: 61 BPM | DIASTOLIC BLOOD PRESSURE: 62 MMHG | OXYGEN SATURATION: 100 % | RESPIRATION RATE: 18 BRPM | TEMPERATURE: 97.4 F | SYSTOLIC BLOOD PRESSURE: 106 MMHG

## 2025-02-10 PROCEDURE — G0300 HHS/HOSPICE OF LPN EA 15 MIN: HCPCS

## 2025-02-12 ENCOUNTER — HOME CARE VISIT (OUTPATIENT)
Dept: HOME HEALTH SERVICES | Facility: HOME HEALTHCARE | Age: 65
End: 2025-02-12
Payer: MEDICARE

## 2025-02-12 PROCEDURE — G0299 HHS/HOSPICE OF RN EA 15 MIN: HCPCS

## 2025-02-14 ENCOUNTER — OFFICE VISIT (OUTPATIENT)
Dept: WOUND CARE | Facility: CLINIC | Age: 65
End: 2025-02-14
Payer: MEDICARE

## 2025-02-14 ENCOUNTER — HOME CARE VISIT (OUTPATIENT)
Dept: HOME HEALTH SERVICES | Facility: HOME HEALTHCARE | Age: 65
End: 2025-02-14
Payer: MEDICARE

## 2025-02-14 DIAGNOSIS — L89.154 STAGE IV PRESSURE ULCER OF SACRAL REGION (HCC): Primary | ICD-10-CM

## 2025-02-14 DIAGNOSIS — L89.223 STAGE III PRESSURE ULCER OF LEFT HIP (HCC): ICD-10-CM

## 2025-02-14 PROCEDURE — 11042 DBRDMT SUBQ TIS 1ST 20SQCM/<: CPT | Performed by: SURGERY

## 2025-02-14 PROCEDURE — 11045 DBRDMT SUBQ TISS EACH ADDL: CPT | Performed by: SURGERY

## 2025-02-14 PROCEDURE — 99213 OFFICE O/P EST LOW 20 MIN: CPT | Performed by: SURGERY

## 2025-02-14 NOTE — PATIENT INSTRUCTIONS
Orders Placed This Encounter   Procedures    Wound cleansing and dressings Pressure Injury Sacrum     Wash your hands with soap and water.    Remove old dressing, discard into plastic bag and place in trash.    Cleanse the wound with Mild Soap & Water or normal saline prior to applying a clean dressing.   Do not use tissue or cotton balls. Do not scrub the wound. Pat dry using gauze.     Sacral wound:  Skin prep to gaby-wound  Apply Calcium Alginate to all wound surfaces and under skin bridge followed by fluffed 4 x 4's to keep alginate in place.   Cover with ABD's.   Tape in place   Change dressings daily and as needed for excessive drainage         Keep pressure off of all wounds as much as possible, limit sitting in chair as much as possible       Continue VNA three x per week (wife will do in between) for dressing changes, except on the day the patient goes to the wound center.      OFF-LOADING   Avoid pressure at wound site. - all wounds, including right back   Wheelchair Cushion. - ROHO cushion      Do Not Sit for Long Periods of Time. - 1 hr max for meals only, otherwise in bed and turn side to side at least   every 3 hours or more frequently         Reposition at least every 1-2 hours while awake.       Follow up in 4 weeks with Dr. Yan.     Standing Status:   Future     Expiration Date:   2/21/2025    Wound cleansing and dressings Skin Tear Left Hip     Left hip:    Change 3x a week and as needed.      Keep pressure off of all wounds as much as possible, limit sitting in chair as much as possible       Continue VNA three x per week (wife will do in between) for dressing changes, except on the day the patient goes to the wound center.          OFF-LOADING   Avoid pressure at wound site. - all wounds, including right back   Wheelchair Cushion. - ROHO cushion      Do Not Sit for Long Periods of Time. - 1 hr max for meals only, otherwise in bed and turn side to side at least   every 3 hours or more  frequently           Reposition at least every 1-2 hours while awake.       Follow up in 4 weeks with Dr. Yan.     Standing Status:   Future     Expiration Date:   2/21/2025

## 2025-02-14 NOTE — PROGRESS NOTES
Wound Procedure Treatment Pressure Injury Sacrum    Performed by: Valentina Painting RN  Authorized by: Tex Yan MD    Associated wounds:   Wound 04/19/24 Pressure Injury Sacrum  Wound cleansed with:  Wound aggrssively cleansed with NSS and gauze  Applied primary dressing:  Calcium alginate  Applied secondary dressing:  ABD and Gauze  Dressing secured with:  Juan and Tape  Wound Procedure Treatment Skin Tear Left Hip    Performed by: Valentina Painting RN  Authorized by: Tex Yan MD    Associated wounds:   Wound 09/30/24 Skin Tear Abrasion(s) Hip Left  Wound cleansed with:  Wound aggrssively cleansed with NSS and gauze  Applied primary dressing:  Silicone bordered foam

## 2025-02-16 ENCOUNTER — HOME CARE VISIT (OUTPATIENT)
Dept: HOME HEALTH SERVICES | Facility: HOME HEALTHCARE | Age: 65
End: 2025-02-16
Payer: MEDICARE

## 2025-02-16 VITALS
DIASTOLIC BLOOD PRESSURE: 60 MMHG | HEART RATE: 64 BPM | RESPIRATION RATE: 18 BRPM | SYSTOLIC BLOOD PRESSURE: 110 MMHG | OXYGEN SATURATION: 97 % | TEMPERATURE: 97.2 F

## 2025-02-17 ENCOUNTER — HOME CARE VISIT (OUTPATIENT)
Dept: HOME HEALTH SERVICES | Facility: HOME HEALTHCARE | Age: 65
End: 2025-02-17
Payer: MEDICARE

## 2025-02-17 VITALS
SYSTOLIC BLOOD PRESSURE: 140 MMHG | TEMPERATURE: 98.2 F | DIASTOLIC BLOOD PRESSURE: 84 MMHG | RESPIRATION RATE: 20 BRPM | OXYGEN SATURATION: 98 %

## 2025-02-17 PROCEDURE — G0299 HHS/HOSPICE OF RN EA 15 MIN: HCPCS

## 2025-02-19 ENCOUNTER — TELEPHONE (OUTPATIENT)
Dept: FAMILY MEDICINE CLINIC | Facility: CLINIC | Age: 65
End: 2025-02-19

## 2025-02-19 ENCOUNTER — HOME CARE VISIT (OUTPATIENT)
Dept: HOME HEALTH SERVICES | Facility: HOME HEALTHCARE | Age: 65
End: 2025-02-19
Payer: MEDICARE

## 2025-02-19 VITALS
RESPIRATION RATE: 18 BRPM | OXYGEN SATURATION: 99 % | TEMPERATURE: 97.6 F | SYSTOLIC BLOOD PRESSURE: 142 MMHG | HEART RATE: 62 BPM | DIASTOLIC BLOOD PRESSURE: 84 MMHG

## 2025-02-19 PROCEDURE — G0300 HHS/HOSPICE OF LPN EA 15 MIN: HCPCS

## 2025-02-21 ENCOUNTER — HOME CARE VISIT (OUTPATIENT)
Dept: HOME HEALTH SERVICES | Facility: HOME HEALTHCARE | Age: 65
End: 2025-02-21
Payer: MEDICARE

## 2025-02-21 VITALS
HEART RATE: 96 BPM | RESPIRATION RATE: 18 BRPM | SYSTOLIC BLOOD PRESSURE: 114 MMHG | DIASTOLIC BLOOD PRESSURE: 86 MMHG | OXYGEN SATURATION: 99 % | TEMPERATURE: 97.8 F

## 2025-02-21 PROCEDURE — G0300 HHS/HOSPICE OF LPN EA 15 MIN: HCPCS

## 2025-02-24 ENCOUNTER — HOME CARE VISIT (OUTPATIENT)
Dept: HOME HEALTH SERVICES | Facility: HOME HEALTHCARE | Age: 65
End: 2025-02-24
Payer: MEDICARE

## 2025-02-25 DIAGNOSIS — G89.29 CHRONIC LOW BACK PAIN WITHOUT SCIATICA, UNSPECIFIED BACK PAIN LATERALITY: ICD-10-CM

## 2025-02-25 DIAGNOSIS — M54.50 CHRONIC LOW BACK PAIN WITHOUT SCIATICA, UNSPECIFIED BACK PAIN LATERALITY: ICD-10-CM

## 2025-02-25 DIAGNOSIS — M86.68 OTHER CHRONIC OSTEOMYELITIS, OTHER SITE (HCC): ICD-10-CM

## 2025-02-25 DIAGNOSIS — F11.20 CONTINUOUS OPIOID DEPENDENCE (HCC): ICD-10-CM

## 2025-02-26 ENCOUNTER — HOME CARE VISIT (OUTPATIENT)
Dept: HOME HEALTH SERVICES | Facility: HOME HEALTHCARE | Age: 65
End: 2025-02-26
Payer: MEDICARE

## 2025-02-26 VITALS
OXYGEN SATURATION: 99 % | HEART RATE: 70 BPM | SYSTOLIC BLOOD PRESSURE: 124 MMHG | RESPIRATION RATE: 18 BRPM | TEMPERATURE: 97.7 F | DIASTOLIC BLOOD PRESSURE: 88 MMHG

## 2025-02-26 PROCEDURE — G0300 HHS/HOSPICE OF LPN EA 15 MIN: HCPCS

## 2025-02-27 RX ORDER — OXYCODONE HYDROCHLORIDE 15 MG/1
15 TABLET ORAL EVERY 6 HOURS PRN
Qty: 120 TABLET | Refills: 0 | Status: SHIPPED | OUTPATIENT
Start: 2025-02-27

## 2025-02-28 ENCOUNTER — HOME CARE VISIT (OUTPATIENT)
Dept: HOME HEALTH SERVICES | Facility: HOME HEALTHCARE | Age: 65
End: 2025-02-28
Payer: MEDICARE

## 2025-02-28 VITALS — DIASTOLIC BLOOD PRESSURE: 82 MMHG | RESPIRATION RATE: 20 BRPM | TEMPERATURE: 97.8 F | SYSTOLIC BLOOD PRESSURE: 132 MMHG

## 2025-02-28 DIAGNOSIS — E11.9 TYPE 2 DIABETES MELLITUS WITHOUT COMPLICATION, WITHOUT LONG-TERM CURRENT USE OF INSULIN (HCC): ICD-10-CM

## 2025-02-28 PROCEDURE — G0299 HHS/HOSPICE OF RN EA 15 MIN: HCPCS

## 2025-03-03 ENCOUNTER — TELEPHONE (OUTPATIENT)
Dept: FAMILY MEDICINE CLINIC | Facility: CLINIC | Age: 65
End: 2025-03-03

## 2025-03-03 ENCOUNTER — HOME CARE VISIT (OUTPATIENT)
Dept: HOME HEALTH SERVICES | Facility: HOME HEALTHCARE | Age: 65
End: 2025-03-03
Payer: MEDICARE

## 2025-03-03 PROCEDURE — G0299 HHS/HOSPICE OF RN EA 15 MIN: HCPCS

## 2025-03-03 RX ORDER — LANCETS
EACH MISCELLANEOUS
Qty: 102 EACH | Refills: 3 | Status: SHIPPED | OUTPATIENT
Start: 2025-03-03

## 2025-03-03 NOTE — TELEPHONE ENCOUNTER
PCP SIGNATURE NEEDED FOR [employer] RECEIVED VIA FAX AND PLACED IN PCP FOLDER TO BE DELIVERED AT ASSIGNED TIMES.      Account#: [id number]    Certificate Of Medical Necessity    Diagnosis:    [id number]  [id number]  [id number]

## 2025-03-04 ENCOUNTER — OFFICE VISIT (OUTPATIENT)
Dept: FAMILY MEDICINE CLINIC | Facility: CLINIC | Age: 65
End: 2025-03-04

## 2025-03-04 VITALS
TEMPERATURE: 98 F | OXYGEN SATURATION: 98 % | HEART RATE: 64 BPM | RESPIRATION RATE: 16 BRPM | DIASTOLIC BLOOD PRESSURE: 60 MMHG | HEIGHT: 67 IN | BODY MASS INDEX: 24.28 KG/M2 | SYSTOLIC BLOOD PRESSURE: 130 MMHG

## 2025-03-04 DIAGNOSIS — E11.9 TYPE 2 DIABETES MELLITUS WITHOUT COMPLICATION, WITHOUT LONG-TERM CURRENT USE OF INSULIN (HCC): Primary | ICD-10-CM

## 2025-03-04 DIAGNOSIS — Z00.00 MEDICARE ANNUAL WELLNESS VISIT, SUBSEQUENT: ICD-10-CM

## 2025-03-04 DIAGNOSIS — I10 BENIGN ESSENTIAL HYPERTENSION: ICD-10-CM

## 2025-03-04 DIAGNOSIS — G82.20 PARAPLEGIC SPINAL PARALYSIS (HCC): ICD-10-CM

## 2025-03-04 DIAGNOSIS — D64.9 ANEMIA, UNSPECIFIED TYPE: ICD-10-CM

## 2025-03-04 DIAGNOSIS — F11.20 CONTINUOUS OPIOID DEPENDENCE (HCC): ICD-10-CM

## 2025-03-04 DIAGNOSIS — E46 PROTEIN MALNUTRITION (HCC): ICD-10-CM

## 2025-03-04 LAB — SL AMB POCT HEMOGLOBIN AIC: 5.3 (ref ?–6.5)

## 2025-03-04 PROCEDURE — 3079F DIAST BP 80-89 MM HG: CPT | Performed by: NURSE PRACTITIONER

## 2025-03-04 PROCEDURE — 99214 OFFICE O/P EST MOD 30 MIN: CPT | Performed by: NURSE PRACTITIONER

## 2025-03-04 PROCEDURE — 83036 HEMOGLOBIN GLYCOSYLATED A1C: CPT | Performed by: NURSE PRACTITIONER

## 2025-03-04 PROCEDURE — G0438 PPPS, INITIAL VISIT: HCPCS | Performed by: NURSE PRACTITIONER

## 2025-03-04 PROCEDURE — G2211 COMPLEX E/M VISIT ADD ON: HCPCS | Performed by: NURSE PRACTITIONER

## 2025-03-04 PROCEDURE — 3074F SYST BP LT 130 MM HG: CPT | Performed by: NURSE PRACTITIONER

## 2025-03-04 RX ORDER — ZINC OXIDE 216 MG/ML
1 LOTION TOPICAL 3 TIMES DAILY
Qty: 21330 ML | Refills: 3 | Status: SHIPPED | OUTPATIENT
Start: 2025-03-04

## 2025-03-04 RX ORDER — ZINC OXIDE 216 MG/ML
1 LOTION TOPICAL 3 TIMES DAILY
Qty: 21330 ML | Refills: 3 | Status: SHIPPED | OUTPATIENT
Start: 2025-03-04 | End: 2025-03-04

## 2025-03-04 NOTE — ASSESSMENT & PLAN NOTE
Wheelchair bound, continue to work with home care for pressure ulcer healing/ weight offloading     Due to diagnosis of paraplegic spinal paralysis the patient has significant mobility limitations and is totally unable to perform ADLS/ MRADLS or iADLS. The patient would benefit and have increased ability to complete ADL's with power scooter which he currently uses and is able to manipulate w/o difficulty. Current power scooter is old and patient would benefit from new, upgraded model in order to help improve functioning and prevent any additional pressure ulcer formation.     Orders:    Nutritional Supplement LIQD; Take 1 Dose by mouth 3 (three) times a day Boost Protein Shakes TID    Ambulatory referral to chronic care management; Future

## 2025-03-04 NOTE — ASSESSMENT & PLAN NOTE
Orders:    Iron Panel (Includes Ferritin, Iron Sat%, Iron, and TIBC); Future    Vitamin B12; Future    CBC and differential; Future    Nutritional Supplement LIQD; Take 1 Dose by mouth 3 (three) times a day Boost Protein Shakes TID

## 2025-03-04 NOTE — PROGRESS NOTES
Name: Rikki Arguello      : 1960      MRN: [id number]  Encounter Provider: KALPANA Yañez  Encounter Date: 3/4/2025   Encounter department: [facility]    Assessment & Plan  Type 2 diabetes mellitus without complication, without long-term current use of insulin ([facility])    Lab Results   Component Value Date    HGBA1C 5.3 2025     Diet controlled  Not on statin, starting ACE   Eye exam UTD  Hx of amputation RLE,     Orders:    POCT hemoglobin A1c    Iron Panel (Includes Ferritin, Iron Sat%, Iron, and TIBC); Future    Vitamin B12; Future    CBC and differential; Future    Comprehensive metabolic panel; Future    Lipid panel; Future    Albumin / creatinine urine ratio; Future    glucose blood (Accu-Chek Guide) test strip; Use 1 each daily Use as instructed    Nutritional Supplement LIQD; Take 1 Dose by mouth 3 (three) times a day Boost Protein Shakes TID      Benign essential hypertension  BP within goal,  Continue with lisinopril 2.5 mg daily     Orders:    Iron Panel (Includes Ferritin, Iron Sat%, Iron, and TIBC); Future    Vitamin B12; Future    CBC and differential; Future    Comprehensive metabolic panel; Future    Lipid panel; Future    Albumin / creatinine urine ratio; Future    Nutritional Supplement LIQD; Take 1 Dose by mouth 3 (three) times a day Boost Protein Shakes TID        Paraplegic spinal paralysis (HCC)  Wheelchair bound, continue to work with home care for pressure ulcer healing/ weight offloading     Due to diagnosis of paraplegic spinal paralysis the patient has significant mobility limitations and is totally unable to perform ADLS/ MRADLS or iADLS. The patient would benefit and have increased ability to complete ADL's with power scooter which he currently uses and is able to manipulate w/o difficulty. Current power scooter is old and patient would benefit from new, upgraded model in order to help improve functioning and prevent any  additional pressure ulcer formation.     Orders:    Nutritional Supplement LIQD; Take 1 Dose by mouth 3 (three) times a day Boost Protein Shakes TID    Ambulatory referral to chronic care management; Future        Continuous opioid dependence (HCC)  Currently prescribed oxycodone 15 mg Q 6 hours (90 MME/ day)  Has been on slow decreasing taper   Discussed alternative treatment plans   Continue with oxycodone at current dose, next appointment plan to decrease further ~10%    Orders:    Nutritional Supplement LIQD; Take 1 Dose by mouth 3 (three) times a day Boost Protein Shakes TID      Medicare annual wellness visit, subsequent    Orders:    Nutritional Supplement LIQD; Take 1 Dose by mouth 3 (three) times a day Boost Protein Shakes TID    Anemia, unspecified type    Orders:    Iron Panel (Includes Ferritin, Iron Sat%, Iron, and TIBC); Future    Vitamin B12; Future    CBC and differential; Future    Nutritional Supplement LIQD; Take 1 Dose by mouth 3 (three) times a day Boost Protein Shakes TID    Protein malnutrition (HCC)    Orders:    Nutritional Supplement LIQD; Take 1 Dose by mouth 3 (three) times a day Boost Protein Shakes TID    BMI Counseling: Body mass index is 24.28 kg/m². Follow-up plan was not completed due to patient receiving palliative care.     Depression Screening and Follow-up Plan: Patient was screened for depression during today's encounter. They screened negative with a PHQ-2 score of 0.        Preventive health issues were discussed with patient, and age appropriate screening tests were ordered as noted in patient's After Visit Summary. Personalized health advice and appropriate referrals for health education or preventive services given if needed, as noted in patient's After Visit Summary.    History of Present Illness     Patient is a 64 YO male who  has a past medical history of Anemia, Blind, Chronic cystitis, Colostomy in place (Formerly [facility]), Detrusor sphincter dyssynergia, Diabetes mellitus ([facility]),  Erectile dysfunction, Frequency of urination, GERD (gastroesophageal reflux disease), History of diabetes mellitus, History of osteomyelitis, leg amputation ([facility]), Hyperlipidemia, Hypertension, Hypospadias, Incomplete bladder emptying, 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(HCC) (9/16/2019), Neurogenic bladder, Paralysis (HCC), Paraplegia (HCC), Spinal cord cysts, Ulcer of sacral region (HCC), and Urge incontinence and is here for a follow up and AWV.    Patient reports no change in condition. Continues to follow with wound care for pressure ulcers to the sacral area and right hip. VNA is assisting with dressing changes. He has no new concerns or issues.     The following portions of the patient's history were reviewed and updated as appropriate: allergies, current medications, past family history, past medical history, past social history, past surgical history and problem list.         Patient Care Team:  KALPANA Yañez as PCP - General (Family Medicine)  Santy Stock MD as PCP - Wound (Wound Care)  MD Bong Hope MD (Urology)    Review of Systems   Constitutional:  Negative for chills and fever.   HENT:  Negative for ear pain and sore throat.    Eyes:  Negative for pain and visual disturbance.   Respiratory:  Negative for cough and shortness of breath.    Cardiovascular:  Negative for chest pain and palpitations.   Gastrointestinal:  Negative for abdominal pain and vomiting.   Genitourinary:  Negative for dysuria and hematuria.   Musculoskeletal:  Positive for arthralgias, back pain, gait problem, myalgias, neck pain and neck stiffness.   Skin:  Positive for wound. Negative for color change and rash.   Neurological:  Negative for seizures and syncope.   All other systems reviewed and are negative.    Medical History Reviewed by provider this encounter:  Meds  Problems       Annual Wellness Visit Questionnaire   Rikki is here for his Subsequent Wellness visit.     Health Risk Assessment:    Patient rates overall health as good. Patient feels that their physical health rating is same. Patient is satisfied with their life. Eyesight was rated as same. Hearing was rated as same. Patient feels that their emotional and mental health rating is same. Patients states they are never, rarely angry. Patient states they are never, rarely unusually tired/fatigued. Pain experienced in the last 7 days has been some. Patient's pain rating has been 8/10.     Depression Screening:   PHQ-2 Score: 0      Fall Risk Screening:   In the past year, patient has experienced: no history of falling in past year      Home Safety:  Patient has trouble with stairs inside or outside of their home. Patient has working smoke alarms and has working carbon monoxide detector. Home safety hazards include: none.     Nutrition:   Current diet is Regular.     Medications:   Patient is not currently taking any over-the-counter supplements. Patient is able to manage medications.     Activities of Daily Living (ADLs)/Instrumental Activities of Daily Living (IADLs):   Walk and transfer into and out of bed and chair?: No  Dress and groom yourself?: No    Bathe or shower yourself?: No    Feed yourself? No  Do your laundry/housekeeping?: No  Manage your money, pay your bills and track your expenses?: No  Make your own meals?: No    Do your own shopping?: No    Previous Hospitalizations:   Any hospitalizations or ED visits within the last 12 months?: No      Advance Care Planning:   Living will: No    Durable POA for healthcare: No    Advanced directive: No      PREVENTIVE SCREENINGS      Cardiovascular Screening:    General: Screening Current      Diabetes Screening:     General: Screening Not Indicated and History Diabetes      Colorectal Cancer Screening:     General: Screening Current      Prostate Cancer Screening:    General: Screening Current      Abdominal Aortic Aneurysm (AAA) Screening:    Risk factors include: tobacco use        Lung  "Cancer Screening:     General: Screening Not Indicated      Hepatitis C Screening:    General: Screening Current    Screening, Brief Intervention, and Referral to Treatment (SBIRT)     Screening  Typical number of drinks in a day: 0  Typical number of drinks in a week: 0  Interpretation: Low risk drinking behavior.    Single Item Drug Screening:  How often have you used an illegal drug (including marijuana) or a prescription medication for non-medical reasons in the past year? never    Single Item Drug Screen Score: 0  Interpretation: Negative screen for possible drug use disorder    Social Drivers of Health     Financial Resource Strain: Low Risk  (3/4/2025)    Overall Financial Resource Strain (CARDIA)     Difficulty of Paying Living Expenses: Not hard at all   Food Insecurity: No Food Insecurity (3/4/2025)    Hunger Vital Sign     Worried About Running Out of Food in the Last Year: Never true     Ran Out of Food in the Last Year: Never true   Transportation Needs: No Transportation Needs (3/4/2025)    PRAPARE - Transportation     Lack of Transportation (Medical): No     Lack of Transportation (Non-Medical): No   Housing Stability: Low Risk  (3/4/2025)    Housing Stability Vital Sign     Unable to Pay for Housing in the Last Year: No     Number of Times Moved in the Last Year: 1     Homeless in the Last Year: No   Utilities: Not At Risk (3/4/2025)    [facility] Utilities     Threatened with loss of utilities: No     No results found.    Objective   /60 (BP Location: Right arm, Patient Position: Sitting, Cuff Size: Standard)   Pulse 64   Temp 98 °F (36.7 °C) (Temporal)   Resp 16   Ht 5' 7\" (1.702 m)   SpO2 98%   BMI 24.28 kg/m²     Physical Exam  Vitals and nursing note reviewed.   Constitutional:       General: He is not in acute distress.     Appearance: Normal appearance. He is not toxic-appearing or diaphoretic.   HENT:      Head: Normocephalic and atraumatic.      Right Ear: External ear normal.      Left " Ear: External ear normal.      Nose: Nose normal.      Mouth/Throat:      Mouth: Mucous membranes are moist.   Eyes:      General:         Right eye: No discharge.         Left eye: No discharge.      Comments: Right eye clouded / blind    Cardiovascular:      Rate and Rhythm: Normal rate and regular rhythm.      Pulses: Normal pulses.      Heart sounds: Normal heart sounds.   Pulmonary:      Effort: Pulmonary effort is normal. No respiratory distress.      Breath sounds: Normal breath sounds. No wheezing.   Musculoskeletal:      Cervical back: Normal range of motion and neck supple.   Skin:     General: Skin is warm and dry.   Neurological:      Mental Status: He is alert and oriented to person, place, and time. Mental status is at baseline.      Cranial Nerves: Cranial nerve deficit present.      Sensory: Sensory deficit present.      Motor: Weakness present.      Coordination: Coordination abnormal.      Gait: Gait abnormal.      Deep Tendon Reflexes: Reflexes abnormal.      Comments: Wheelchair   Right arm contracture    Psychiatric:         Behavior: Behavior normal.

## 2025-03-04 NOTE — ASSESSMENT & PLAN NOTE
Lab Results   Component Value Date    HGBA1C 5.3 03/04/2025     Diet controlled  Not on statin, starting ACE   Eye exam UTD  Hx of amputation RLE,     Orders:    POCT hemoglobin A1c    Iron Panel (Includes Ferritin, Iron Sat%, Iron, and TIBC); Future    Vitamin B12; Future    CBC and differential; Future    Comprehensive metabolic panel; Future    Lipid panel; Future    Albumin / creatinine urine ratio; Future    glucose blood (Accu-Chek Guide) test strip; Use 1 each daily Use as instructed    Nutritional Supplement LIQD; Take 1 Dose by mouth 3 (three) times a day Boost Protein Shakes TID

## 2025-03-04 NOTE — ASSESSMENT & PLAN NOTE
Currently prescribed oxycodone 15 mg Q 6 hours (90 MME/ day)  Has been on slow decreasing taper   Discussed alternative treatment plans   Continue with oxycodone at current dose, next appointment plan to decrease further ~10%    Orders:    Nutritional Supplement LIQD; Take 1 Dose by mouth 3 (three) times a day Boost Protein Shakes TID

## 2025-03-04 NOTE — ASSESSMENT & PLAN NOTE
BP within goal,  Continue with lisinopril 2.5 mg daily     Orders:    Iron Panel (Includes Ferritin, Iron Sat%, Iron, and TIBC); Future    Vitamin B12; Future    CBC and differential; Future    Comprehensive metabolic panel; Future    Lipid panel; Future    Albumin / creatinine urine ratio; Future    Nutritional Supplement LIQD; Take 1 Dose by mouth 3 (three) times a day Boost Protein Shakes TID

## 2025-03-05 ENCOUNTER — HOME CARE VISIT (OUTPATIENT)
Dept: HOME HEALTH SERVICES | Facility: HOME HEALTHCARE | Age: 65
End: 2025-03-05
Payer: MEDICARE

## 2025-03-05 VITALS — RESPIRATION RATE: 18 BRPM | TEMPERATURE: 97.8 F | DIASTOLIC BLOOD PRESSURE: 70 MMHG | SYSTOLIC BLOOD PRESSURE: 120 MMHG

## 2025-03-05 VITALS
SYSTOLIC BLOOD PRESSURE: 122 MMHG | TEMPERATURE: 97.3 F | OXYGEN SATURATION: 100 % | DIASTOLIC BLOOD PRESSURE: 82 MMHG | HEART RATE: 65 BPM | RESPIRATION RATE: 18 BRPM

## 2025-03-05 PROCEDURE — G0300 HHS/HOSPICE OF LPN EA 15 MIN: HCPCS

## 2025-03-07 ENCOUNTER — HOME CARE VISIT (OUTPATIENT)
Dept: HOME HEALTH SERVICES | Facility: HOME HEALTHCARE | Age: 65
End: 2025-03-07
Payer: MEDICARE

## 2025-03-07 ENCOUNTER — TELEPHONE (OUTPATIENT)
Dept: FAMILY MEDICINE CLINIC | Facility: CLINIC | Age: 65
End: 2025-03-07

## 2025-03-07 NOTE — TELEPHONE ENCOUNTER
[employer] is calling in regards to a form they have been faxing over I let her know we haven't received the fax from them so they will be faxing over another form. She also needs face to face office notes faxed to her at [phone] and the note needs to have mention of the power wheelchair.

## 2025-03-10 ENCOUNTER — PATIENT OUTREACH (OUTPATIENT)
Dept: FAMILY MEDICINE CLINIC | Facility: CLINIC | Age: 65
End: 2025-03-10

## 2025-03-10 ENCOUNTER — TELEPHONE (OUTPATIENT)
Dept: FAMILY MEDICINE CLINIC | Facility: CLINIC | Age: 65
End: 2025-03-10

## 2025-03-10 ENCOUNTER — HOME CARE VISIT (OUTPATIENT)
Dept: HOME HEALTH SERVICES | Facility: HOME HEALTHCARE | Age: 65
End: 2025-03-10
Payer: MEDICARE

## 2025-03-10 PROCEDURE — G0299 HHS/HOSPICE OF RN EA 15 MIN: HCPCS

## 2025-03-10 NOTE — TELEPHONE ENCOUNTER
PCP SIGNATURE NEEDED FOR [employer]  FORM RECEIVED VIA FAX AND PLACED IN PCP FOLDER TO BE DELIVERED AT ASSIGNED TIMES.      Requesting a copy of Face-to-Face Encounter/Office Visit Notes from a Mobility Exam

## 2025-03-10 NOTE — PROGRESS NOTES
Patient identified for CCM billing.    I called the patient using [employer] but received voicemail.  Message was left asking for a return call.  I will continue further outreach.    Chart reviewed.

## 2025-03-11 ENCOUNTER — HOME CARE VISIT (OUTPATIENT)
Dept: HOME HEALTH SERVICES | Facility: HOME HEALTHCARE | Age: 65
End: 2025-03-11
Payer: MEDICARE

## 2025-03-11 NOTE — CASE COMMUNICATION
Ship to   Pt. Home XX      Ordering MD - Dr. Tex Yna    Wound 1  - Sacral area  -  Full - xx  -   Frequency - daily        Dry Dressings   ABD 5x9 [id number] ....25      Tape  Tape. Mefix 2inch [id number] ...2      Calcium Alginates  (In place of Aquacel or Maxsorb)  Alginate 4x4 [id number] ....14      Ostomy  include Brand and order number (ordered by the box)  NOT STOCKED  [place] -xx  [facility] -  [id number] -  1 box

## 2025-03-11 NOTE — CASE COMMUNICATION
Ship to  Clinician  XX      Ordering MD - Dr. Tex Yan    Wound 1  - Sacral area  -  Full - xx  -   Frequency - daily      Tape  Tape. Mefix 2inch [id number] ... 1      Calcium Alginates  (In place of Aquacel or Maxsorb)  Alginate 4x4 [id number] ....2

## 2025-03-12 ENCOUNTER — HOME CARE VISIT (OUTPATIENT)
Dept: HOME HEALTH SERVICES | Facility: HOME HEALTHCARE | Age: 65
End: 2025-03-12
Payer: MEDICARE

## 2025-03-12 VITALS
OXYGEN SATURATION: 96 % | RESPIRATION RATE: 20 BRPM | HEART RATE: 86 BPM | SYSTOLIC BLOOD PRESSURE: 124 MMHG | TEMPERATURE: 96.7 F | DIASTOLIC BLOOD PRESSURE: 84 MMHG

## 2025-03-12 PROCEDURE — G0299 HHS/HOSPICE OF RN EA 15 MIN: HCPCS

## 2025-03-14 ENCOUNTER — PATIENT OUTREACH (OUTPATIENT)
Dept: FAMILY MEDICINE CLINIC | Facility: CLINIC | Age: 65
End: 2025-03-14

## 2025-03-14 ENCOUNTER — OFFICE VISIT (OUTPATIENT)
Dept: WOUND CARE | Facility: CLINIC | Age: 65
End: 2025-03-14
Payer: MEDICARE

## 2025-03-14 ENCOUNTER — HOME CARE VISIT (OUTPATIENT)
Dept: HOME HEALTH SERVICES | Facility: HOME HEALTHCARE | Age: 65
End: 2025-03-14
Payer: MEDICARE

## 2025-03-14 DIAGNOSIS — L89.154 STAGE IV PRESSURE ULCER OF SACRAL REGION (HCC): Primary | ICD-10-CM

## 2025-03-14 DIAGNOSIS — G89.29 CHRONIC LOW BACK PAIN WITHOUT SCIATICA, UNSPECIFIED BACK PAIN LATERALITY: ICD-10-CM

## 2025-03-14 DIAGNOSIS — E11.9 TYPE 2 DIABETES MELLITUS WITHOUT COMPLICATION, WITHOUT LONG-TERM CURRENT USE OF INSULIN (HCC): ICD-10-CM

## 2025-03-14 DIAGNOSIS — K21.9 GASTROESOPHAGEAL REFLUX DISEASE WITHOUT ESOPHAGITIS: ICD-10-CM

## 2025-03-14 DIAGNOSIS — G82.20 PARAPLEGIC SPINAL PARALYSIS (HCC): ICD-10-CM

## 2025-03-14 DIAGNOSIS — M54.50 CHRONIC LOW BACK PAIN WITHOUT SCIATICA, UNSPECIFIED BACK PAIN LATERALITY: ICD-10-CM

## 2025-03-14 DIAGNOSIS — M86.68 OTHER CHRONIC OSTEOMYELITIS, OTHER SITE (HCC): ICD-10-CM

## 2025-03-14 PROCEDURE — 11045 DBRDMT SUBQ TISS EACH ADDL: CPT | Performed by: SURGERY

## 2025-03-14 PROCEDURE — 11042 DBRDMT SUBQ TIS 1ST 20SQCM/<: CPT | Performed by: SURGERY

## 2025-03-14 RX ORDER — ACETAMINOPHEN 325 MG/1
650 TABLET ORAL EVERY 6 HOURS PRN
Qty: 40 TABLET | Refills: 0 | Status: SHIPPED | OUTPATIENT
Start: 2025-03-14

## 2025-03-14 RX ORDER — IBUPROFEN 800 MG/1
800 TABLET, FILM COATED ORAL EVERY 8 HOURS PRN
Qty: 60 TABLET | Refills: 0 | Status: SHIPPED | OUTPATIENT
Start: 2025-03-14

## 2025-03-14 RX ORDER — OMEPRAZOLE 20 MG/1
20 CAPSULE, DELAYED RELEASE ORAL
Qty: 90 CAPSULE | Refills: 0 | Status: SHIPPED | OUTPATIENT
Start: 2025-03-14 | End: 2026-03-09

## 2025-03-14 RX ORDER — ASPIRIN 81 MG/1
81 TABLET ORAL DAILY
Qty: 90 TABLET | Refills: 0 | Status: SHIPPED | OUTPATIENT
Start: 2025-03-14

## 2025-03-14 NOTE — PROGRESS NOTES
"Patient ID: Rikki Arguello is a 65 y.o. male Date of Birth 1960     Chief Complaint  Chief Complaint   Patient presents with    Follow Up Wound Care Visit     Left hip and sacral wounds       Allergies  Patient has no known allergies.    Assessment:    Stage IV pressure ulcer of sacral region (HCC)  The wounds are continue to improve.  On the patient's left the undermining towards the hip has almost healed down and there is some new skin developing.  On the right side again the wounds are healing down less undermining and new skin growth.  Continue the same care.  Follow-up in a month                         Diagnoses and all orders for this visit:    Stage IV pressure ulcer of sacral region (HCC)  -     Wound cleansing and dressings Pressure Injury Sacrum; Future  -     Wound Procedure Treatment Pressure Injury Sacrum    Paraplegic spinal paralysis (HCC)  -     Wound cleansing and dressings Pressure Injury Sacrum; Future    Type 2 diabetes mellitus without complication, without long-term current use of insulin ([facility])  -     Wound cleansing and dressings Pressure Injury Sacrum; Future    Other orders  -     Debridement                Debridement     Date/Time: 3/14/2025 10:30 AM  Universal Protocol:  Consent: Verbal consent obtained.  Consent given by: patient  Time out: Immediately prior to procedure a \"time out\" was called to verify the correct patient, procedure, equipment, support staff and site/side marked as required.    Debridement Details  Performed by: physician  Debridement type: surgical  Level of debridement: subcutaneous tissue  Pain control: lidocaine 4%    Post-debridement measurements  Length (cm): 6  Width (cm): 6  Depth (cm): 2.5  Percent debrided: 100%  Surface Area (cm^2): 36  Area Debrided (cm^2): 36  Volume (cm^3): 90    Tissue and other material debrided: subcutaneous tissue  Devitalized tissue debrided: biofilm and slough  Instrument(s) utilized: curette  Procedural pain (0-10): " insensate  Post-procedural pain: insensate   Response to treatment: procedure was tolerated well        Plan:     Wound 04/19/24 Pressure Injury Sacrum (Active)   Wound Image Images linked 03/14/25 1034   Wound Description Pink;Yellow 03/14/25 1018   Pressure Injury Stage 4 03/14/25 1018   Non-staged Wound Description Full thickness 03/14/25 1018   Wound Length (cm) 6 cm 03/14/25 1018   Wound Width (cm) 6 cm 03/14/25 1018   Wound Depth (cm) 2.5 cm 03/14/25 1018   Wound Surface Area (cm^2) 36 cm^2 03/14/25 1018   Wound Volume (cm^3) 90 cm^3 03/14/25 1018   Calculated Wound Volume (cm^3) 90 cm^3 03/14/25 1018   Change in Wound Size % -35.34 03/14/25 1018   Tunneling 1 2.7 cm 03/14/25 1018   Tunneling 1 in depth located at 1:00 03/14/25 1018   Undermining 1 6 03/14/25 1018   Undermining 2 0 03/14/25 1018   Undermining 1 is depth extending from 8-11, deepest at 10 03/14/25 1018   Undermining 2 is depth extending from resolved 03/14/25 1018   Drainage Amount Copious 03/14/25 1018   Drainage Description Tan;Serosanguineous 03/14/25 1018   Shelby-wound Assessment Intact;Scar Tissue;Maceration 03/14/25 1018   Treatments Irrigation with NSS 03/14/25 1018       Wound 09/30/24 Skin Tear Hip Left (Active)   Wound Image Images linked 03/14/25 1020   Wound Description Epithelialization 03/14/25 1018   Non-staged Wound Description Full thickness 03/14/25 1018   Wound Length (cm) 0 cm 03/14/25 1018   Wound Width (cm) 0 cm 03/14/25 1018   Wound Depth (cm) 0 cm 03/14/25 1018   Wound Surface Area (cm^2) 0 cm^2 03/14/25 1018   Wound Volume (cm^3) 0 cm^3 03/14/25 1018   Calculated Wound Volume (cm^3) 0 cm^3 03/14/25 1018   Change in Wound Size % 100 03/14/25 1018   Drainage Amount None 03/14/25 1018   Shelby-wound Assessment Intact;Scar Tissue 03/14/25 1018   Wound packed? No 03/14/25 1018       Wound 04/19/24 Pressure Injury Sacrum (Active)   Date First Assessed: 04/19/24   Primary Wound Type: Pressure Injury  Location: Sacrum  Wound  Outcome: Palliative       Wound 09/30/24 Skin Tear Hip Left (Active)   Date First Assessed: 09/30/24   Primary Wound Type: Skin Tear  Location: Hip  Wound Location Orientation: Left  Wound Description (Comments): pink gran pc of skin peeled back   Incision's 1st Dressing: cleansed with saline pat dry skin prep then silco...       [REMOVED] Wound 08/26/19 Buttocks Left;Distal;Lateral (Removed)   Resolved Date/Resolved Time: 08/26/20 1056  Date First Assessed/Time First Assessed: 08/26/19 1130   Pre-Existing Wound: Yes  Location: Buttocks  Wound Location Orientation: Left;Distal;Lateral  Wound Description (Comments): Deep ischial wound       [REMOVED] Wound 09/16/19 Buttocks Right;Distal (Removed)   Resolved Date/Resolved Time: 08/26/20 1055  Date First Assessed/Time First Assessed: 09/16/19 1657   Pre-Existing Wound: Yes  Location: Buttocks  Wound Location Orientation: Right;Distal       [REMOVED] Wound 10/28/19 Knee Left (Removed)   Resolved Date/Resolved Time: 08/26/20 1055  Date First Assessed/Time First Assessed: 10/28/19 0657   Pre-Existing Wound: Yes  Location: Knee  Wound Location Orientation: Left  Wound Description (Comments): round black  Dressing Status: Open to air       [REMOVED] Wound 10/28/19 Buttocks Left;Mid (Removed)   Resolved Date/Resolved Time: 08/26/20 1056  Date First Assessed/Time First Assessed: 10/28/19 1108   Pre-Existing Wound: Yes  Location: Buttocks  Wound Location Orientation: Left;Mid  Wound Description (Comments): Stage 2 vs traumatic injury       [REMOVED] Wound 11/01/19 Other (Comment) N/A (Removed)   Resolved Date/Resolved Time: 08/26/20 1055  Date First Assessed/Time First Assessed: 11/01/19 1640   Location: Other (Comment)  Wound Location Orientation: N/A  Wound Description (Comments): scrotum dressed with exofin       [REMOVED] Wound 08/26/20 Pressure Injury Buttocks Left (Removed)   Resolved Date: 04/19/24  Date First Assessed/Time First Assessed: 08/26/20 1056   Primary Wound  Type: Pressure Injury  Location: Buttocks  Wound Location Orientation: Left  Wound Outcome: (c) Converged       [REMOVED] Wound 08/26/20 Pressure Injury Hip Left (Removed)   Resolved Date: 10/04/23  Date First Assessed/Time First Assessed: 08/26/20 1057   Primary Wound Type: Pressure Injury  Location: Hip  Wound Location Orientation: Left  Wound Outcome: Palliative       [REMOVED] Wound 07/29/21 Pressure Injury Back Right;Lower;Lateral (Removed)   Resolved Date/Resolved Time: 01/23/24 1934  Date First Assessed: 07/29/21   Primary Wound Type: Pressure Injury  Location: Back  Wound Location Orientation: Right;Lower;Lateral  Wound Outcome: Healed       [REMOVED] Wound 11/05/21 Pressure Injury Sacrum (Removed)   Resolved Date: 04/19/24  Date First Assessed/Time First Assessed: 11/05/21 1059   Present on Original Admission: Yes  Primary Wound Type: Pressure Injury  Location: Sacrum  Wound Outcome: (c) Palliative       [REMOVED] Wound 05/27/22 Skin Tear Buttocks Left;Proximal (Removed)   Resolved Date/Resolved Time: 08/05/22 0851  Date First Assessed/Time First Assessed: 05/27/22 0946   Primary Wound Type: Skin Tear  Location: Buttocks  Wound Location Orientation: Left;Proximal  Wound Outcome: Healed       [REMOVED] Wound 03/17/23 Pressure Injury Hip Lateral;Left;Proximal (Removed)   Resolved Date: 08/21/23  Date First Assessed/Time First Assessed: 03/17/23 1039   Primary Wound Type: Pressure Injury  Location: Hip  Wound Location Orientation: Lateral;Left;Proximal  Wound Outcome: Healed       [REMOVED] Wound 06/19/23 Pressure Injury Hip Left;Medial (Removed)   Resolved Date: 09/18/23  Date First Assessed: 06/19/23   Present on Original Admission: No  Primary Wound Type: Pressure Injury  Location: Hip  Wound Location Orientation: Left;Medial  Wound Description (Comments): granulation  yellow serous drainage ...       [REMOVED] Wound 10/13/23 Pressure Injury Finger (Comment which one) Right (Removed)   Resolved Date:  11/20/23  Date First Assessed/Time First Assessed: 10/13/23 1035   Primary Wound Type: Pressure Injury  Location: Finger (Comment which one)  Wound Location Orientation: Right  Wound Outcome: Healed       [REMOVED] Wound 01/17/24 Pressure Injury Flank Right;Upper (Removed)   Resolved Date: 02/02/24  Date First Assessed/Time First Assessed: 01/17/24 1926   Present on Original Admission: Yes  Primary Wound Type: Pressure Injury  Location: Flank  Wound Location Orientation: Right;Upper       [REMOVED] Wound 01/17/24 Knee Anterior;Left (Removed)   Resolved Date: 02/02/24  Date First Assessed/Time First Assessed: 01/17/24 1930   Location: Knee  Wound Location Orientation: Anterior;Left       [REMOVED] Wound 01/18/24 Pressure Injury Hip Left (Removed)   Resolved Date: 08/09/24  Date First Assessed/Time First Assessed: 01/18/24 1137   Primary Wound Type: Pressure Injury  Location: Hip  Wound Location Orientation: Left  Wound Outcome: Healed       [REMOVED] Wound 01/25/24 Chest Right (Removed)   Resolved Date: 03/15/24  Date First Assessed/Time First Assessed: 01/25/24 1205   Location: Chest  Wound Location Orientation: Right  Wound Description (Comments): addy [id number] negative pressure wound therapy system  Incision's 1st Dressing: DRESSING ...       Subjective:      .    Mr. Arguello is a 61-year-old gentleman with paraplegia and history of multiple pressure wounds with 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.  He had been rescheduled for a debridement and flap closure by plastics in August but developed a new wound on his right mid to lower back chest wall.  This wound probes deep and has been closed on the outside but the tract has not been improving.  He had a CT scan that shows a deep tracking to the muscle but no fistulization to the internal thoracic cavity.    02/04/2022 he returns now for re-evaluation.  He still has had visiting nurses but has not been seen in the Wound Center since November.  He denies  any change or complaint    03/11/2022 no changes since been seen last.  No new complaints.    04/22/2022 no issues.  No changes.    05/27/2022.  Doing well.  Denies fevers or chills.  No issues spoke about plastics a re-evaluation but he wants to wait for COVID to wane more and maybe next fall    07/22/2022.  He has not been seen here since May mostly due to transportation issues.  He has significant more pain and drainage on his right back chest wall wound.  Otherwise no changes    08/05/2022 denies fevers or chills.  Still in pain on the right side.    3/17/2023 he returns for evaluation.  He was seen in the wound center by another provider month or 2 ago.    6/9/2023.  Here for long-term follow-up.  No significant changes.    7/14/2023.  No changes.  Doing well.    9/22/2023 he had an outpatient CT scan.  The right chest wall wound has increasing drainage.  The dressings from the sacral area have some greenish color    10/13/2023.  He did receive a VAC.  He still has greenish drainage on the dressings    10/27/2023 no significant changes.  He states he did get his hospital mattress.  Tolerating the VAC well.    12/8/2023.  He has had significant creasing drainage from the VAC dressing from the right back chest wall wound.  Difficulty maintaining enough canisters for the VAC machine.    12/22/2023 he denies any change.  Denies any fevers or chills.  Denies any issues.    4/19/2024 he returns now for resumption of care.  Since being seen last he did get admitted to [facility] and underwent extensive debridement of the chest wall wound rib resection with latissimus dorsi flap closure.  That wound did very well and the wound is healed.  He is here for evaluation of his chronic sacral and hip wounds.    5/31/2024.  Overall is doing well.  He was seen by plastics and his right side is healing well.  He is complain of more pain today from the sacrum.  Denies fevers or chills.  Visit nurses and his wife are  doing dressing changes      6/20/2024.  Note complaints or changes since seen last.    8/9/2024 no issues.  No changes.  Still the same dressing changes.    9/20/2024.  He reports no changes.  Doing well.  No new wounds.    10/18/2024 still doing very well.  Doing dressing changes without difficulty.  No new issues.    11/15/2024 no new issues no changes.    2/14/2025 overall doing very well.  Tolerating dressing changes with difficulty.  Denies fevers or chills.                The following portions of the patient's history were reviewed and updated as appropriate: allergies, current medications, past family history, past medical history, past social history, past surgical history and problem list.    Review of Systems   Constitutional:  Negative for chills and fever.   HENT:  Negative for ear pain and sore throat.    Eyes:  Negative for pain and visual disturbance.   Respiratory:  Negative for cough and shortness of breath.    Cardiovascular:  Negative for chest pain.   Gastrointestinal:  Negative for abdominal pain and vomiting.   Skin:  Positive for wound. Negative for color change.   Neurological:  Negative for seizures and syncope.   Psychiatric/Behavioral:  Negative for agitation and behavioral problems.    All other systems reviewed and are negative.        Objective:       Wound 04/19/24 Pressure Injury Sacrum (Active)   Wound Image Images linked 03/14/25 1034   Wound Description Pink;Yellow 03/14/25 1018   Pressure Injury Stage 4 03/14/25 1018   Non-staged Wound Description Full thickness 03/14/25 1018   Wound Length (cm) 6 cm 03/14/25 1018   Wound Width (cm) 6 cm 03/14/25 1018   Wound Depth (cm) 2.5 cm 03/14/25 1018   Wound Surface Area (cm^2) 36 cm^2 03/14/25 1018   Wound Volume (cm^3) 90 cm^3 03/14/25 1018   Calculated Wound Volume (cm^3) 90 cm^3 03/14/25 1018   Change in Wound Size % -35.34 03/14/25 1018   Tunneling 1 2.7 cm 03/14/25 1018   Tunneling 1 in depth located at 1:00 03/14/25 1018    Undermining 1 6 03/14/25 1018   Undermining 2 0 03/14/25 1018   Undermining 1 is depth extending from 8-11, deepest at 10 03/14/25 1018   Undermining 2 is depth extending from resolved 03/14/25 1018   Drainage Amount Copious 03/14/25 1018   Drainage Description Tan;Serosanguineous 03/14/25 1018   Shelby-wound Assessment Intact;Scar Tissue;Maceration 03/14/25 1018   Treatments Irrigation with NSS 03/14/25 1018       Wound 09/30/24 Skin Tear Hip Left (Active)   Wound Image Images linked 03/14/25 1020   Wound Description Epithelialization 03/14/25 1018   Non-staged Wound Description Full thickness 03/14/25 1018   Wound Length (cm) 0 cm 03/14/25 1018   Wound Width (cm) 0 cm 03/14/25 1018   Wound Depth (cm) 0 cm 03/14/25 1018   Wound Surface Area (cm^2) 0 cm^2 03/14/25 1018   Wound Volume (cm^3) 0 cm^3 03/14/25 1018   Calculated Wound Volume (cm^3) 0 cm^3 03/14/25 1018   Change in Wound Size % 100 03/14/25 1018   Drainage Amount None 03/14/25 1018   Shelby-wound Assessment Intact;Scar Tissue 03/14/25 1018   Wound packed? No 03/14/25 1018       There were no vitals taken for this visit.    Physical Exam  Vitals and nursing note reviewed. Exam conducted with a chaperone present.   Constitutional:       Appearance: Normal appearance.   Cardiovascular:      Rate and Rhythm: Normal rate and regular rhythm.   Pulmonary:      Effort: Pulmonary effort is normal.      Breath sounds: Normal breath sounds.   Abdominal:      General: There is no distension.      Palpations: Abdomen is soft. There is no mass.      Tenderness: There is no abdominal tenderness.      Comments: Ostomy   Neurological:      Mental Status: He is alert.   Psychiatric:         Mood and Affect: Mood normal.         Behavior: Behavior normal.           Wound Instructions:  Orders Placed This Encounter   Procedures    Wound cleansing and dressings Pressure Injury Sacrum     Wash your hands with soap and water.    Remove old dressing, discard into plastic bag and  place in trash.    Cleanse the wound with Mild Soap & Water or normal saline prior to applying a clean dressing.   Do not use tissue or cotton balls. Do not scrub the wound. Pat dry using gauze.      Left hip wound is healed. No dressing needed.     Sacral wound:  Skin prep to gaby-wound  Apply Calcium Alginate to all wound surfaces and under skin bridge followed by fluffed 4 x 4's to keep alginate in place.   Cover with ABD's.   Secure with medfix tape.   Change dressings daily and as needed for excessive drainage         OFF-LOADING   Avoid pressure at wound site. - all wounds, including right back   Use Wheelchair Cushion. - ROHO cushion      Do Not Sit for Long Periods of Time. - 1 hr max for meals only, otherwise in bed and turn side to side at least   every 3 hours or more frequently         Reposition at least every 1-2 hours while awake.         Continue VNA three x per week (wife will do in between) for dressing changes, except on the day the patient goes to the wound center.               Follow up in 4 weeks with Dr. Yan.     Standing Status:   Future     Expiration Date:   3/21/2025    Wound Procedure Treatment Pressure Injury Sacrum     This order was created via procedure documentation    Debridement     This order was created via procedure documentation        Diagnosis ICD-10-CM Associated Orders   1. Stage IV pressure ulcer of sacral region ([facility])  L89.154 Wound cleansing and dressings Pressure Injury Sacrum     Wound Procedure Treatment Pressure Injury Sacrum      2. Paraplegic spinal paralysis ([facility])  G82.20 Wound cleansing and dressings Pressure Injury Sacrum      3. Type 2 diabetes mellitus without complication, without long-term current use of insulin ([facility])  E11.9 Wound cleansing and dressings Pressure Injury Sacrum

## 2025-03-14 NOTE — PATIENT INSTRUCTIONS
Orders Placed This Encounter   Procedures    Wound cleansing and dressings Pressure Injury Sacrum     Wash your hands with soap and water.    Remove old dressing, discard into plastic bag and place in trash.    Cleanse the wound with Mild Soap & Water or normal saline prior to applying a clean dressing.   Do not use tissue or cotton balls. Do not scrub the wound. Pat dry using gauze.      Left hip wound is healed. No dressing needed.     Sacral wound:  Skin prep to gaby-wound  Apply Calcium Alginate to all wound surfaces and under skin bridge followed by fluffed 4 x 4's to keep alginate in place.   Cover with ABD's.   Secure with medfix tape.   Change dressings daily and as needed for excessive drainage         OFF-LOADING   Avoid pressure at wound site. - all wounds, including right back   Use Wheelchair Cushion. - ROHO cushion      Do Not Sit for Long Periods of Time. - 1 hr max for meals only, otherwise in bed and turn side to side at least   every 3 hours or more frequently         Reposition at least every 1-2 hours while awake.         Continue VNA three x per week (wife will do in between) for dressing changes, except on the day the patient goes to the wound center.               Follow up in 4 weeks with Dr. Yan.     Standing Status:   Future     Expiration Date:   3/21/2025    Wound Procedure Treatment Pressure Injury Sacrum     This order was created via procedure documentation

## 2025-03-14 NOTE — LETTER
March 14, 2025        Dear Mr. Arguello,    We would like to invite you to participate in our Chronic Care Management program with the goal of improving your health. We will match you up with a care manager who will work with you to keep your chronic conditions under control. Please call your clinic or log on to [employer] if you have any questions or would like to enroll.    We look forward to working toward a healthier you together.         Sincerely,      Adriane FUENTES Care Manager  [phone]

## 2025-03-14 NOTE — PROGRESS NOTES
I called the patient using [employer] but received voicemail.  Message was left asking for a return call.  I am sending the UTR letter via [employer] and closing this case.

## 2025-03-14 NOTE — ASSESSMENT & PLAN NOTE
The wounds are continue to improve.  On the patient's left the undermining towards the hip has almost healed down and there is some new skin developing.  On the right side again the wounds are healing down less undermining and new skin growth.  Continue the same care.  Follow-up in a month

## 2025-03-17 ENCOUNTER — HOME CARE VISIT (OUTPATIENT)
Dept: HOME HEALTH SERVICES | Facility: HOME HEALTHCARE | Age: 65
End: 2025-03-17
Payer: MEDICARE

## 2025-03-17 VITALS
HEART RATE: 82 BPM | DIASTOLIC BLOOD PRESSURE: 80 MMHG | RESPIRATION RATE: 16 BRPM | OXYGEN SATURATION: 96 % | TEMPERATURE: 98.9 F | SYSTOLIC BLOOD PRESSURE: 120 MMHG

## 2025-03-17 VITALS
HEART RATE: 72 BPM | RESPIRATION RATE: 18 BRPM | SYSTOLIC BLOOD PRESSURE: 128 MMHG | TEMPERATURE: 97.4 F | OXYGEN SATURATION: 95 % | DIASTOLIC BLOOD PRESSURE: 82 MMHG

## 2025-03-17 PROCEDURE — G0300 HHS/HOSPICE OF LPN EA 15 MIN: HCPCS

## 2025-03-19 ENCOUNTER — HOME CARE VISIT (OUTPATIENT)
Dept: HOME HEALTH SERVICES | Facility: HOME HEALTHCARE | Age: 65
End: 2025-03-19
Payer: MEDICARE

## 2025-03-19 VITALS
HEART RATE: 72 BPM | DIASTOLIC BLOOD PRESSURE: 86 MMHG | OXYGEN SATURATION: 96 % | TEMPERATURE: 98.5 F | RESPIRATION RATE: 22 BRPM | SYSTOLIC BLOOD PRESSURE: 120 MMHG

## 2025-03-19 PROCEDURE — G0299 HHS/HOSPICE OF RN EA 15 MIN: HCPCS

## 2025-03-19 NOTE — CASE COMMUNICATION
Ship to    Pt. Home  XX    Ordering MD Haris [facility] Supplies    16fr 5cc cath [id number] QTY 1     Syringe 10cc [id number] QTY 1     10cc cath tray [id number] QTY 1     Leg bag large BARD [id number] QTY 3

## 2025-03-21 ENCOUNTER — HOME CARE VISIT (OUTPATIENT)
Dept: HOME HEALTH SERVICES | Facility: HOME HEALTHCARE | Age: 65
End: 2025-03-21
Payer: MEDICARE

## 2025-03-21 VITALS
SYSTOLIC BLOOD PRESSURE: 118 MMHG | DIASTOLIC BLOOD PRESSURE: 78 MMHG | HEART RATE: 76 BPM | RESPIRATION RATE: 18 BRPM | TEMPERATURE: 98.7 F | OXYGEN SATURATION: 98 %

## 2025-03-21 PROCEDURE — G0300 HHS/HOSPICE OF LPN EA 15 MIN: HCPCS

## 2025-03-24 ENCOUNTER — HOME CARE VISIT (OUTPATIENT)
Dept: HOME HEALTH SERVICES | Facility: HOME HEALTHCARE | Age: 65
End: 2025-03-24
Payer: MEDICARE

## 2025-03-25 ENCOUNTER — HOME CARE VISIT (OUTPATIENT)
Dept: HOME HEALTH SERVICES | Facility: HOME HEALTHCARE | Age: 65
End: 2025-03-25
Payer: MEDICARE

## 2025-03-26 ENCOUNTER — HOME CARE VISIT (OUTPATIENT)
Dept: HOME HEALTH SERVICES | Facility: HOME HEALTHCARE | Age: 65
End: 2025-03-26
Payer: MEDICARE

## 2025-03-26 VITALS
RESPIRATION RATE: 18 BRPM | DIASTOLIC BLOOD PRESSURE: 84 MMHG | TEMPERATURE: 96.7 F | HEART RATE: 64 BPM | OXYGEN SATURATION: 98 % | SYSTOLIC BLOOD PRESSURE: 136 MMHG

## 2025-03-26 DIAGNOSIS — M86.68 OTHER CHRONIC OSTEOMYELITIS, OTHER SITE (HCC): ICD-10-CM

## 2025-03-26 DIAGNOSIS — M54.50 CHRONIC LOW BACK PAIN WITHOUT SCIATICA, UNSPECIFIED BACK PAIN LATERALITY: ICD-10-CM

## 2025-03-26 DIAGNOSIS — G89.29 CHRONIC LOW BACK PAIN WITHOUT SCIATICA, UNSPECIFIED BACK PAIN LATERALITY: ICD-10-CM

## 2025-03-26 DIAGNOSIS — F11.20 CONTINUOUS OPIOID DEPENDENCE (HCC): ICD-10-CM

## 2025-03-26 PROCEDURE — G0299 HHS/HOSPICE OF RN EA 15 MIN: HCPCS

## 2025-03-26 RX ORDER — OXYCODONE HYDROCHLORIDE 15 MG/1
15 TABLET ORAL EVERY 6 HOURS PRN
Qty: 120 TABLET | Refills: 0 | Status: SHIPPED | OUTPATIENT
Start: 2025-03-26

## 2025-03-27 ENCOUNTER — HOME CARE VISIT (OUTPATIENT)
Dept: HOME HEALTH SERVICES | Facility: HOME HEALTHCARE | Age: 65
End: 2025-03-27
Payer: MEDICARE

## 2025-03-27 RX ORDER — OXYCODONE HYDROCHLORIDE 15 MG/1
15 TABLET ORAL EVERY 6 HOURS PRN
Qty: 120 TABLET | Refills: 0 | OUTPATIENT
Start: 2025-03-27

## 2025-03-27 NOTE — CASE COMMUNICATION
Ship to   Clinician   XX    MD ordering  - Dr. Yan      Wound 1 - Sacral wound   -  Full -  xx  Frequency -  Daily          Gauze 4x4 N/S 200 4x4s per unit  [id number] ....1      Tape    Transpore white  2in [id number] ...1

## 2025-03-27 NOTE — CASE COMMUNICATION
Ship to  Pt. Home XX      Wound 1 - Sacral wound   -  Full -  xx  Frequency -  Daily      [facility] Supp[employer]  16fr 5cc cath [id number]....1    Syringe 10cc [id number] ...1    10cc cath tray [id number] ...1      Gauze 4x4 N/S 200 4x4s per unit  [id number] ....1    Gauze Kerlix (fluff roll) 4inch sterile [id number] ....6    ABD 5x9 [id number] ......28      Tape    Tape. Mefix 2inch [id number] ...2

## 2025-03-28 ENCOUNTER — HOME CARE VISIT (OUTPATIENT)
Dept: HOME HEALTH SERVICES | Facility: HOME HEALTHCARE | Age: 65
End: 2025-03-28
Payer: MEDICARE

## 2025-03-28 VITALS
OXYGEN SATURATION: 98 % | HEART RATE: 100 BPM | SYSTOLIC BLOOD PRESSURE: 124 MMHG | DIASTOLIC BLOOD PRESSURE: 82 MMHG | RESPIRATION RATE: 24 BRPM | TEMPERATURE: 97.8 F

## 2025-03-28 PROCEDURE — G0299 HHS/HOSPICE OF RN EA 15 MIN: HCPCS

## 2025-03-31 ENCOUNTER — HOME CARE VISIT (OUTPATIENT)
Dept: HOME HEALTH SERVICES | Facility: HOME HEALTHCARE | Age: 65
End: 2025-03-31
Payer: MEDICARE

## 2025-03-31 VITALS
HEART RATE: 88 BPM | TEMPERATURE: 97.4 F | DIASTOLIC BLOOD PRESSURE: 78 MMHG | SYSTOLIC BLOOD PRESSURE: 114 MMHG | RESPIRATION RATE: 120 BRPM | OXYGEN SATURATION: 98 %

## 2025-03-31 PROCEDURE — G0299 HHS/HOSPICE OF RN EA 15 MIN: HCPCS

## 2025-03-31 PROCEDURE — 400014 VN F/U

## 2025-04-01 DIAGNOSIS — G89.29 CHRONIC LOW BACK PAIN WITHOUT SCIATICA, UNSPECIFIED BACK PAIN LATERALITY: ICD-10-CM

## 2025-04-01 DIAGNOSIS — K21.9 GASTROESOPHAGEAL REFLUX DISEASE WITHOUT ESOPHAGITIS: ICD-10-CM

## 2025-04-01 DIAGNOSIS — M86.68 OTHER CHRONIC OSTEOMYELITIS, OTHER SITE (HCC): ICD-10-CM

## 2025-04-01 DIAGNOSIS — M54.50 CHRONIC LOW BACK PAIN WITHOUT SCIATICA, UNSPECIFIED BACK PAIN LATERALITY: ICD-10-CM

## 2025-04-01 RX ORDER — ACETAMINOPHEN 325 MG/1
650 TABLET ORAL EVERY 6 HOURS PRN
Qty: 40 TABLET | Refills: 0 | OUTPATIENT
Start: 2025-04-01

## 2025-04-01 RX ORDER — OMEPRAZOLE 20 MG/1
20 CAPSULE, DELAYED RELEASE ORAL
Qty: 90 CAPSULE | Refills: 0 | OUTPATIENT
Start: 2025-04-01 | End: 2026-03-27

## 2025-04-01 RX ORDER — ASPIRIN 81 MG/1
81 TABLET ORAL DAILY
Qty: 90 TABLET | Refills: 0 | OUTPATIENT
Start: 2025-04-01

## 2025-04-01 RX ORDER — IBUPROFEN 800 MG/1
800 TABLET, FILM COATED ORAL EVERY 8 HOURS PRN
Qty: 60 TABLET | Refills: 0 | OUTPATIENT
Start: 2025-04-01

## 2025-04-02 ENCOUNTER — HOME CARE VISIT (OUTPATIENT)
Dept: HOME HEALTH SERVICES | Facility: HOME HEALTHCARE | Age: 65
End: 2025-04-02
Payer: MEDICARE

## 2025-04-02 VITALS
RESPIRATION RATE: 20 BRPM | HEART RATE: 64 BPM | TEMPERATURE: 97 F | OXYGEN SATURATION: 96 % | SYSTOLIC BLOOD PRESSURE: 126 MMHG | DIASTOLIC BLOOD PRESSURE: 84 MMHG

## 2025-04-02 PROCEDURE — G0299 HHS/HOSPICE OF RN EA 15 MIN: HCPCS

## 2025-04-04 ENCOUNTER — HOME CARE VISIT (OUTPATIENT)
Dept: HOME HEALTH SERVICES | Facility: HOME HEALTHCARE | Age: 65
End: 2025-04-04
Payer: MEDICARE

## 2025-04-04 VITALS
DIASTOLIC BLOOD PRESSURE: 86 MMHG | SYSTOLIC BLOOD PRESSURE: 136 MMHG | RESPIRATION RATE: 24 BRPM | TEMPERATURE: 97.5 F | OXYGEN SATURATION: 98 % | HEART RATE: 76 BPM

## 2025-04-04 PROCEDURE — G0299 HHS/HOSPICE OF RN EA 15 MIN: HCPCS

## 2025-04-04 NOTE — CASE COMMUNICATION
60 day summary of care for DOS from  to 3/28/25 -  Next certification period begins 3/29/25    Patient has been seen in the past 60 days for wound care management and instruction and sp dangelo care and management  Focus of care for next 60 days for each discipline ordered: wound care primary suprapubic cath changes secondary  Primary diagnoses/co-morbidities/recent procedures in past 60 days that impact current episode: stage 4 decubiti    Vital signs for 3/28/25 visit -  T 97.8 -   and regular R 24  /82 - Pulse ox 98%  Changes in patient condition in the last 60 days (i.e. labs, med changes, falls, assessment findings) include  None  Changes to wound care orders in the past 60 days include  None  Current wound measurements 8cm x 8cm x 3cm with tunnnelling of 4cm at 10 o'clock and 3cm at 2 o'clock  Current description of wound and surrounding tissue  - wound  bed has small amount of necrotic tissue and rest is pink, yellow and white - surrounding skin fragile, scar tissue and intact  The patient remains homebound due to paraplegia/wheelchair bound - needs assist with ADL's  The availability of a willing and able caregiver - wife -  has cg through [facility] 7 days per week for 7 hours  Patient is unable to perform wound care due to location - wife can do on Tuesdays, Thursdays, Sat and Sundays  -  unable to do on M-W-F due to her work schedule  -  wife does not live with patient   MD. appointments -  last [facility] appointment was 3/14/25 -  to return on 4/11/25    Patient will require continued care for wound care.    All home health orders including treatments and visit order frequencies were reviewed and deemed appropriate at the time of this summary.

## 2025-04-06 ENCOUNTER — HOME CARE VISIT (OUTPATIENT)
Dept: HOME HEALTH SERVICES | Facility: HOME HEALTHCARE | Age: 65
End: 2025-04-06
Payer: MEDICARE

## 2025-04-06 NOTE — CASE COMMUNICATION
Ship to Pt. Home XX       Wound 1 - Sacral wound - Full - xx Frequency - Daily         Gauze 4x4 N/S 200 4x4s per unit [id number] ....1       Tape     Tape. Mefix [facility] [id number] ...2

## 2025-04-06 NOTE — CASE COMMUNICATION
Ship to Clinician XX     MD ordering - Dr. Yan       Wound 1 - Sacral wound - Full - xx Frequency - Daily           Gauze 4x4 N/S 200 4x4s per unit [id number] ....1

## 2025-04-07 ENCOUNTER — TELEPHONE (OUTPATIENT)
Dept: HOME HEALTH SERVICES | Facility: HOME HEALTHCARE | Age: 65
End: 2025-04-07

## 2025-04-07 ENCOUNTER — HOME CARE VISIT (OUTPATIENT)
Dept: HOME HEALTH SERVICES | Facility: HOME HEALTHCARE | Age: 65
End: 2025-04-07
Payer: MEDICARE

## 2025-04-07 VITALS
DIASTOLIC BLOOD PRESSURE: 78 MMHG | RESPIRATION RATE: 20 BRPM | TEMPERATURE: 97.4 F | OXYGEN SATURATION: 98 % | SYSTOLIC BLOOD PRESSURE: 114 MMHG | HEART RATE: 88 BPM

## 2025-04-08 DIAGNOSIS — M86.68 OTHER CHRONIC OSTEOMYELITIS, OTHER SITE (HCC): ICD-10-CM

## 2025-04-08 DIAGNOSIS — G89.29 CHRONIC LOW BACK PAIN WITHOUT SCIATICA, UNSPECIFIED BACK PAIN LATERALITY: ICD-10-CM

## 2025-04-08 DIAGNOSIS — M54.50 CHRONIC LOW BACK PAIN WITHOUT SCIATICA, UNSPECIFIED BACK PAIN LATERALITY: ICD-10-CM

## 2025-04-08 RX ORDER — ACETAMINOPHEN 325 MG/1
650 TABLET ORAL EVERY 6 HOURS PRN
Qty: 40 TABLET | Refills: 0 | Status: SHIPPED | OUTPATIENT
Start: 2025-04-08

## 2025-04-08 RX ORDER — IBUPROFEN 800 MG/1
800 TABLET, FILM COATED ORAL EVERY 8 HOURS PRN
Qty: 60 TABLET | Refills: 0 | Status: SHIPPED | OUTPATIENT
Start: 2025-04-08

## 2025-04-09 ENCOUNTER — HOME CARE VISIT (OUTPATIENT)
Dept: HOME HEALTH SERVICES | Facility: HOME HEALTHCARE | Age: 65
End: 2025-04-09
Payer: MEDICARE

## 2025-04-09 VITALS
SYSTOLIC BLOOD PRESSURE: 116 MMHG | OXYGEN SATURATION: 97 % | DIASTOLIC BLOOD PRESSURE: 78 MMHG | RESPIRATION RATE: 20 BRPM | HEART RATE: 60 BPM | TEMPERATURE: 97.6 F

## 2025-04-09 PROCEDURE — G0299 HHS/HOSPICE OF RN EA 15 MIN: HCPCS

## 2025-04-10 ENCOUNTER — HOME CARE VISIT (OUTPATIENT)
Dept: HOME HEALTH SERVICES | Facility: HOME HEALTHCARE | Age: 65
End: 2025-04-10
Payer: MEDICARE

## 2025-04-10 NOTE — CASE COMMUNICATION
Ship to Pt. Home XX       Wound 1 - Sacral wound - Full - xx Frequency - Daily         ABD 5x9 [id number] ......40

## 2025-04-14 ENCOUNTER — HOME CARE VISIT (OUTPATIENT)
Dept: HOME HEALTH SERVICES | Facility: HOME HEALTHCARE | Age: 65
End: 2025-04-14
Payer: MEDICARE

## 2025-04-14 VITALS
OXYGEN SATURATION: 97 % | SYSTOLIC BLOOD PRESSURE: 108 MMHG | HEART RATE: 90 BPM | DIASTOLIC BLOOD PRESSURE: 80 MMHG | RESPIRATION RATE: 20 BRPM | TEMPERATURE: 97.9 F

## 2025-04-14 PROCEDURE — G0299 HHS/HOSPICE OF RN EA 15 MIN: HCPCS

## 2025-04-16 ENCOUNTER — HOME CARE VISIT (OUTPATIENT)
Dept: HOME HEALTH SERVICES | Facility: HOME HEALTHCARE | Age: 65
End: 2025-04-16
Payer: MEDICARE

## 2025-04-16 ENCOUNTER — DOCUMENTATION (OUTPATIENT)
Dept: WOUND CARE | Facility: CLINIC | Age: 65
End: 2025-04-16

## 2025-04-16 PROCEDURE — G0299 HHS/HOSPICE OF RN EA 15 MIN: HCPCS

## 2025-04-16 NOTE — PROGRESS NOTES
Returned call from [facility] nurse Steff Hdz. She reports that she noted 3 small superficial open areas on Rikki's left hip during her visit today. She surmises they are a result of tape. She dressed them together with a silicone bordered foam.  She is seeking approval for this.  She was advised to continue the silicone coated bordered foam to the area and to change every 3 days until the wounds are healed or the patient's next appointment. She verbalized understanding and is in agreement with this plan.  Patient's next appointment scheduled 4/25. Steff was also instructed to call for sooner appointment is areas worsen.

## 2025-04-17 ENCOUNTER — HOME CARE VISIT (OUTPATIENT)
Dept: HOME HEALTH SERVICES | Facility: HOME HEALTHCARE | Age: 65
End: 2025-04-17
Payer: MEDICARE

## 2025-04-17 VITALS
TEMPERATURE: 97.2 F | HEART RATE: 68 BPM | RESPIRATION RATE: 20 BRPM | DIASTOLIC BLOOD PRESSURE: 80 MMHG | OXYGEN SATURATION: 98 % | SYSTOLIC BLOOD PRESSURE: 112 MMHG

## 2025-04-17 NOTE — CASE COMMUNICATION
...  Ordering MD - Dr. Bong [facility] [employer]     16fr 5cc cath [id number]....QTY 1     Syringe 10cc [id number].... QTY 1     10cc cath tray [id number]..... QTY 1     Leg bag large BARD 879607....QTY 3     Colostomy supplies  - Darin -  New Image 2 piece  Ref # [id number]...1 box                       New Image 2 piece Flextend  Ref # [id number]...2 boxes

## 2025-04-17 NOTE — Clinical Note
Sorry, Thanks. I was having trouble with my computer yesterday and after I wrote the supply note I tried to sync and I could not and then when I looked for the order today to make sure it was finished I could not find it in past visits so I resent the entire thing again.  ----- Message -----  From: Jessa Chambers RN  Sent: 4/17/2025   8:53 AM EDT  To: Ysabel Hdz RN      I only ordered the 4x4 and medfix tape because you had ordered the other items in a prior supply note today  ----- Message -----  From: Ysabel Hdz RN  Sent: 4/17/2025   8:24 AM EDT  To: /[facility] Supplies    Ship to Pt. Home XX       Wound 1 - Sacral wound - Full - xx Frequency - Daily       [employer] [employer]   16fr 5cc cath [id number]....1     Syringe 10cc [id number] ...1     10cc cath tray 808089 ...1     Leg bag large BARD 175411....  3         Gauze 4x4 N/S 200 4x4s per unit 386555 ....1     Tape   Tape. Mefix 2inch [id number] ...2       Ostomy include Brand and order number (ordered by the box) NOT STOCKED   [place] -xx   Pouches - New Image 2 piece  - Ref # [id number] - 1 box     Wafers -  New Image 2 piece -  Ref # [id number]....2 boxes

## 2025-04-17 NOTE — CASE COMMUNICATION
Ship to Pt. Home XX       Wound 1 - Sacral wound - Full - xx Frequency - Daily       [employer] [employer]   16fr 5cc cath 054745....1     Syringe 10cc 4689028 ...1     10cc cath tray 533446 ...1     Leg bag large BARD 727270....  3         Gauze 4x4 N/S 200 4x4s per unit 398088 ....1     Tape   Tape. Mefix 2inc 2008900 ...2       Ostomy include Brand and order number (ordered by the box) NOT STOCKED   [place] -xx   Pouches - New Image 2 pi daphney  - Ref # [id number] - 1 box     Wafers -  New Image 2 piece -  Ref # [id number]....2 boxes

## 2025-04-18 ENCOUNTER — HOME CARE VISIT (OUTPATIENT)
Dept: HOME HEALTH SERVICES | Facility: HOME HEALTHCARE | Age: 65
End: 2025-04-18
Payer: MEDICARE

## 2025-04-18 VITALS
SYSTOLIC BLOOD PRESSURE: 136 MMHG | OXYGEN SATURATION: 98 % | TEMPERATURE: 96.8 F | RESPIRATION RATE: 16 BRPM | HEART RATE: 64 BPM | DIASTOLIC BLOOD PRESSURE: 88 MMHG

## 2025-04-18 PROCEDURE — G0299 HHS/HOSPICE OF RN EA 15 MIN: HCPCS

## 2025-04-22 ENCOUNTER — HOME CARE VISIT (OUTPATIENT)
Dept: HOME HEALTH SERVICES | Facility: HOME HEALTHCARE | Age: 65
End: 2025-04-22
Payer: MEDICARE

## 2025-04-22 ENCOUNTER — TELEPHONE (OUTPATIENT)
Dept: FAMILY MEDICINE CLINIC | Facility: CLINIC | Age: 65
End: 2025-04-22

## 2025-04-22 NOTE — TELEPHONE ENCOUNTER
Called patient back but he said he didn't leave a voicemail. Explained if he thinks of anything to please give us a call.

## 2025-04-23 ENCOUNTER — HOME CARE VISIT (OUTPATIENT)
Dept: HOME HEALTH SERVICES | Facility: HOME HEALTHCARE | Age: 65
End: 2025-04-23
Payer: MEDICARE

## 2025-04-23 VITALS
HEART RATE: 68 BPM | TEMPERATURE: 96.4 F | DIASTOLIC BLOOD PRESSURE: 84 MMHG | SYSTOLIC BLOOD PRESSURE: 152 MMHG | OXYGEN SATURATION: 99 % | RESPIRATION RATE: 22 BRPM

## 2025-04-23 PROCEDURE — G0299 HHS/HOSPICE OF RN EA 15 MIN: HCPCS

## 2025-04-24 ENCOUNTER — HOME CARE VISIT (OUTPATIENT)
Dept: HOME HEALTH SERVICES | Facility: HOME HEALTHCARE | Age: 65
End: 2025-04-24
Payer: MEDICARE

## 2025-04-24 NOTE — CASE COMMUNICATION
Ship to Pt. Home XX       Wound 1 - Sacral wound - Full - xx Frequency - Daily         Gauze 4x4 N/S 200 4x4s per unit 889532 ....2    ABD 5x9 704829 ......40     Tape     Tape. Mefix 2inch [id number] ...3 boxes    Calcium Alginates  (In place of Aquacel or Maxsorb)  Alginate 4x4 558732 ....8

## 2025-04-25 ENCOUNTER — HOME CARE VISIT (OUTPATIENT)
Dept: HOME HEALTH SERVICES | Facility: HOME HEALTHCARE | Age: 65
End: 2025-04-25
Payer: MEDICARE

## 2025-04-28 ENCOUNTER — HOME CARE VISIT (OUTPATIENT)
Dept: HOME HEALTH SERVICES | Facility: HOME HEALTHCARE | Age: 65
End: 2025-04-28
Payer: MEDICARE

## 2025-04-28 VITALS
SYSTOLIC BLOOD PRESSURE: 114 MMHG | RESPIRATION RATE: 20 BRPM | DIASTOLIC BLOOD PRESSURE: 80 MMHG | TEMPERATURE: 98 F | HEART RATE: 80 BPM | OXYGEN SATURATION: 97 %

## 2025-04-28 DIAGNOSIS — E11.9 TYPE 2 DIABETES MELLITUS WITHOUT COMPLICATION, WITHOUT LONG-TERM CURRENT USE OF INSULIN (HCC): ICD-10-CM

## 2025-04-28 DIAGNOSIS — R80.9 MICROALBUMINURIA: ICD-10-CM

## 2025-04-28 PROCEDURE — G0299 HHS/HOSPICE OF RN EA 15 MIN: HCPCS

## 2025-04-28 RX ORDER — LISINOPRIL 2.5 MG/1
2.5 TABLET ORAL DAILY
Qty: 90 TABLET | Refills: 0 | Status: SHIPPED | OUTPATIENT
Start: 2025-04-28

## 2025-04-29 DIAGNOSIS — G89.29 CHRONIC LOW BACK PAIN WITHOUT SCIATICA, UNSPECIFIED BACK PAIN LATERALITY: ICD-10-CM

## 2025-04-29 DIAGNOSIS — M86.68 OTHER CHRONIC OSTEOMYELITIS, OTHER SITE (HCC): ICD-10-CM

## 2025-04-29 DIAGNOSIS — M54.50 CHRONIC LOW BACK PAIN WITHOUT SCIATICA, UNSPECIFIED BACK PAIN LATERALITY: ICD-10-CM

## 2025-04-29 DIAGNOSIS — F11.20 CONTINUOUS OPIOID DEPENDENCE (HCC): ICD-10-CM

## 2025-04-30 ENCOUNTER — TELEPHONE (OUTPATIENT)
Dept: FAMILY MEDICINE CLINIC | Facility: CLINIC | Age: 65
End: 2025-04-30

## 2025-04-30 ENCOUNTER — HOME CARE VISIT (OUTPATIENT)
Dept: HOME HEALTH SERVICES | Facility: HOME HEALTHCARE | Age: 65
End: 2025-04-30
Payer: MEDICARE

## 2025-04-30 VITALS
TEMPERATURE: 96.5 F | HEART RATE: 80 BPM | SYSTOLIC BLOOD PRESSURE: 120 MMHG | RESPIRATION RATE: 20 BRPM | DIASTOLIC BLOOD PRESSURE: 80 MMHG | OXYGEN SATURATION: 97 %

## 2025-04-30 PROCEDURE — G0299 HHS/HOSPICE OF RN EA 15 MIN: HCPCS

## 2025-04-30 NOTE — TELEPHONE ENCOUNTER
Myriam  [facility]   [facility] [phone]    Last office visit: 03/04/2025  Next appt: 06/03/2025    Seeking notation info from PCP; specifically noting pt is wheelchair bound or in need of a new power wheel chair.     Updated office notes are required specifying information listed above.    Please advise,   Thank you.

## 2025-05-02 ENCOUNTER — OFFICE VISIT (OUTPATIENT)
Dept: WOUND CARE | Facility: CLINIC | Age: 65
End: 2025-05-02
Payer: MEDICARE

## 2025-05-02 VITALS
TEMPERATURE: 97 F | HEART RATE: 88 BPM | SYSTOLIC BLOOD PRESSURE: 110 MMHG | RESPIRATION RATE: 16 BRPM | DIASTOLIC BLOOD PRESSURE: 80 MMHG

## 2025-05-02 DIAGNOSIS — E11.9 TYPE 2 DIABETES MELLITUS WITHOUT COMPLICATION, WITHOUT LONG-TERM CURRENT USE OF INSULIN (HCC): ICD-10-CM

## 2025-05-02 DIAGNOSIS — G82.20 PARAPLEGIC SPINAL PARALYSIS (HCC): ICD-10-CM

## 2025-05-02 DIAGNOSIS — L89.154 STAGE IV PRESSURE ULCER OF SACRAL REGION (HCC): Primary | ICD-10-CM

## 2025-05-02 PROCEDURE — 97597 DBRDMT OPN WND 1ST 20 CM/<: CPT | Performed by: NURSE PRACTITIONER

## 2025-05-02 PROCEDURE — 97598 DBRDMT OPN WND ADDL 20CM/<: CPT | Performed by: NURSE PRACTITIONER

## 2025-05-02 RX ORDER — LIDOCAINE HYDROCHLORIDE 40 MG/ML
5 SOLUTION TOPICAL ONCE
Status: COMPLETED | OUTPATIENT
Start: 2025-05-02 | End: 2025-05-02

## 2025-05-02 RX ADMIN — LIDOCAINE HYDROCHLORIDE 5 ML: 40 SOLUTION TOPICAL at 13:50

## 2025-05-02 NOTE — PROGRESS NOTES
Wound Procedure Treatment Pressure Injury Sacrum    Performed by: Sarah White RN  Authorized by: KALPANA Shrestha  Associated wounds:   Wound 04/19/24 Pressure Injury Sacrum    Wound cleansed with:  NSS   Applied to periwound:  Skin prep   Applied primary dressing:  Calcium alginate   Applied secondary dressing:  Gauze and ABD   Dressing secured with:  Tape

## 2025-05-02 NOTE — PATIENT INSTRUCTIONS
Orders Placed This Encounter   Procedures    Wound cleansing and dressings     Wound cleansing and dressings Pressure Injury Sacrum   Wash your hands with soap and water.    Remove old dressing, discard into plastic bag and place in trash.    Cleanse the wound with Mild Soap & Water or normal saline prior to applying a clean dressing.   Do not use tissue or cotton balls. Do not scrub the wound. Pat dry using gauze.        Left hip superior wound- apply bordered foam.      Sacral wound:  Skin prep to gaby-wound  Apply Calcium Alginate to all wound surfaces and under skin bridge followed by fluffed 4 x 4's to keep alginate in place.   Cover with ABD's.   Secure with medfix tape.   Change dressings daily and as needed for excessive drainage         OFF-LOADING   Avoid pressure at wound site. - all wounds, including right back   Use Wheelchair Cushion. - ROHO cushion      Do Not Sit for Long Periods of Time. - 1 hr max for meals only, otherwise in bed and turn side to side at least   every 3 hours or more frequently         Reposition at least every 1-2 hours while awake.          Continue VNA three x per week (wife will do in between) for dressing changes, except on the day the patient goes to the wound center.                Follow up in 4 weeks with Dr. Yan     Standing Status:   Future     Expiration Date:   5/9/2025    Debridement Pressure Injury Sacrum     This order was created via procedure documentation

## 2025-05-02 NOTE — PROGRESS NOTES
Name: Rikki Arguello      : 1960      MRN: [id number]  Encounter Provider: KALPANA Shrestha  Encounter Date: 2025   Encounter department: [facility] WOUND [facility]  :  Assessment & Plan  Stage IV pressure ulcer of sacral region ([facility])    Orders:    Wound cleansing and dressings; Future    lidocaine (XYLOCAINE) 4 % topical solution 5 mL    Wound Procedure Treatment Pressure Injury Sacrum    Wound Procedure Treatment Left;Superior Hip    Type 2 diabetes mellitus without complication, without long-term current use of insulin ([facility])    Lab Results   Component Value Date    HGBA1C 5.3 2025       Orders:    Wound cleansing and dressings; Future    Wound Procedure Treatment Pressure Injury Sacrum    Paraplegic spinal paralysis ([facility])    Orders:    Wound cleansing and dressings; Future    Wound Procedure Treatment Pressure Injury Sacrum    Plan:  1.  F/U palliative visit.  Sacral wound debrided.  Wound will with clean granular wound base.  Continue current plan of care  2.  A1C results reviewed with the patient today.  Patient maintaining tight glycemic control  3.  Patient will follow-up in 4 weeks with Dr. Yan    History of Present Illness   Chief Complaint   Patient presents with    Follow Up Wound Care Visit     Sacral wound   Here for wound follow up.  F/u palliative visit for stage IV pressure ulcer of sacral region.  No new complaints.  He denies any pain, fevers, or chills.      Objective   /80   Pulse 88   Temp (!) 97 °F (36.1 °C)   Resp 16       Wound 24 Pressure Injury Sacrum (Active)   Wound Image   25 1318   Wound Description Probes to bone;Pink;Epithelialization;Slough 25 1318   Pressure Injury Stage 4 25 1018   Non-staged Wound Description Full thickness 25 1318   Wound Length (cm) 10.5 cm 25 1318   Wound Width (cm) 6 cm 25 1318   Wound Depth (cm) 4 cm 25 1318   Wound Surface Area (cm^2) 63 cm^2 25  1318   Wound Volume (cm^3) 252 cm^3 05/02/25 1318   Calculated Wound Volume (cm^3) 252 cm^3 05/02/25 1318   Change in Wound Size % -278.95 05/02/25 1318   Number of tunnels 1 11/15/24 1109   Tunneling 1 2.7 cm 03/14/25 1018   Tunneling 1 in depth located at 1:00 03/14/25 1018   Number of underminings 1 02/14/25 1042   Undermining 1 3 05/02/25 1318   Undermining 2 0 03/14/25 1018   Undermining 1 is depth extending from 8-10 oclock 05/02/25 1318   Undermining 2 is depth extending from resolved 03/14/25 1018   Drainage Amount Copious 05/02/25 1318   Drainage Description Tan;Serosanguineous 05/02/25 1318   Shelby-wound Assessment Intact;Scar Tissue;Maceration 05/02/25 1318   Treatments Irrigation with NSS 05/02/25 1318   Wound packed? Yes 02/14/25 1042   Packing- # removed 1 02/14/25 1042   Dressing Status Intact;Old drainage 02/14/25 1042       Wound 04/14/25 Hip Left;Superior (Active)   Wound Image   05/02/25 1314   Wound Description Epithelialization;Pink 05/02/25 1314   Wound Length (cm) 0.1 cm 05/02/25 1314   Wound Width (cm) 0.1 cm 05/02/25 1314   Wound Depth (cm) 0.1 cm 05/02/25 1314   Wound Surface Area (cm^2) 0.01 cm^2 05/02/25 1314   Wound Volume (cm^3) 0.001 cm^3 05/02/25 1314   Calculated Wound Volume (cm^3) 0 cm^3 05/02/25 1314   Drainage Amount Small 05/02/25 1314   Drainage Description Serous 05/02/25 1314   Treatments Irrigation with NSS 05/02/25 1314       Wound 04/14/25 Hip Left;Mid (Active)   Wound Image   05/02/25 1316   Wound Description Epithelialization 05/02/25 1318   Wound Length (cm) 0 cm 05/02/25 1318   Wound Width (cm) 0 cm 05/02/25 1318   Wound Depth (cm) 0 cm 05/02/25 1318   Wound Surface Area (cm^2) 0 cm^2 05/02/25 1318   Wound Volume (cm^3) 0 cm^3 05/02/25 1318   Calculated Wound Volume (cm^3) 0 cm^3 05/02/25 1318   Drainage Amount None 05/02/25 1318   Shelby-wound Assessment Pink 05/02/25 1318       Wound 04/14/25 Hip Distal;Left (Active)   Wound Image   05/02/25 1317   Wound Description  "Epithelialization 05/02/25 1317   Wound Length (cm) 0 cm 05/02/25 1315   Wound Width (cm) 0 cm 05/02/25 1315   Wound Depth (cm) 0 cm 05/02/25 1315   Wound Surface Area (cm^2) 0 cm^2 05/02/25 1315   Wound Volume (cm^3) 0 cm^3 05/02/25 1315   Calculated Wound Volume (cm^3) 0 cm^3 05/02/25 1315   Drainage Amount None 05/02/25 1315   Treatments Irrigation with NSS 05/02/25 1315       Debridement   Wound 04/19/24 Pressure Injury Sacrum     Date/Time: 5/2/2025 1:00 PM  Universal Protocol:  procedure performed by consultantConsent: Written consent obtained.  Consent given by: patient  Time out: Immediately prior to procedure a \"time out\" was called to verify the correct patient, procedure, equipment, support staff and site/side marked as required.  Timeout called at: 5/2/2025 1:39 PM.  Patient identity confirmed: verbally with patient    Debridement Details  Performed by: NP  Debridement type: selective  Pain control: lidocaine 4%    Post-debridement measurements  Length (cm): 10.5  Width (cm): 6  Depth (cm): 4  Percent debrided: 100%  Surface Area (cm^2): 63  Area Debrided (cm^2): 63  Volume (cm^3): 252    Devitalized tissue debrided: biofilm, fibrin and slough  Instrument(s) utilized: curette  Bleeding: small  Hemostasis obtained with: pressure  Procedural pain (0-10): 0  Post-procedural pain: 0   Response to treatment: procedure was tolerated well               "

## 2025-05-02 NOTE — TELEPHONE ENCOUNTER
I contacted Myriam from [employer] for a fax number to fax over most recent office notes. No answer, lvm for a call back.

## 2025-05-02 NOTE — ASSESSMENT & PLAN NOTE
Orders:    Wound cleansing and dressings; Future    Wound Procedure Treatment Pressure Injury Sacrum

## 2025-05-02 NOTE — TELEPHONE ENCOUNTER
Patient call requesting update on following medication refill       oxyCODONE (ROXICODONE) 15 mg immediate release tablet

## 2025-05-02 NOTE — PROGRESS NOTES
Wound Procedure Treatment Left;Superior Hip    Performed by: Sarah White RN  Authorized by: KALPANA Shrestha  Associated wounds:   Wound 04/14/25 Hip Left;Superior    Wound cleansed with:  NSS   Applied to periwound:  Skin prep   Applied primary dressing:  Silicone bordered foam

## 2025-05-02 NOTE — ASSESSMENT & PLAN NOTE
Lab Results   Component Value Date    HGBA1C 5.3 03/04/2025       Orders:    Wound cleansing and dressings; Future    Wound Procedure Treatment Pressure Injury Sacrum

## 2025-05-05 ENCOUNTER — HOME CARE VISIT (OUTPATIENT)
Dept: HOME HEALTH SERVICES | Facility: HOME HEALTHCARE | Age: 65
End: 2025-05-05
Payer: MEDICARE

## 2025-05-05 DIAGNOSIS — M86.68 OTHER CHRONIC OSTEOMYELITIS, OTHER SITE (HCC): ICD-10-CM

## 2025-05-05 DIAGNOSIS — M54.50 CHRONIC LOW BACK PAIN WITHOUT SCIATICA, UNSPECIFIED BACK PAIN LATERALITY: ICD-10-CM

## 2025-05-05 DIAGNOSIS — F11.20 CONTINUOUS OPIOID DEPENDENCE (HCC): ICD-10-CM

## 2025-05-05 DIAGNOSIS — G89.29 CHRONIC LOW BACK PAIN WITHOUT SCIATICA, UNSPECIFIED BACK PAIN LATERALITY: ICD-10-CM

## 2025-05-05 PROCEDURE — G0299 HHS/HOSPICE OF RN EA 15 MIN: HCPCS

## 2025-05-05 NOTE — TELEPHONE ENCOUNTER
Patient request refill on following medication     oxyCODONE (ROXICODONE) 15 mg immediate release tablet

## 2025-05-05 NOTE — TELEPHONE ENCOUNTER
Pt needs refill oxyCODONE (ROXICODONE) 15 mg immediate release tablet     Please advise, thank you

## 2025-05-06 ENCOUNTER — HOME CARE VISIT (OUTPATIENT)
Dept: HOME HEALTH SERVICES | Facility: HOME HEALTHCARE | Age: 65
End: 2025-05-06
Payer: MEDICARE

## 2025-05-06 RX ORDER — OXYCODONE HYDROCHLORIDE 15 MG/1
15 TABLET ORAL EVERY 6 HOURS PRN
Qty: 120 TABLET | Refills: 0 | Status: SHIPPED | OUTPATIENT
Start: 2025-05-06

## 2025-05-06 NOTE — CASE COMMUNICATION
Ship to Pt. Home XX       Wound 1 - Sacral wound - Full - xx Frequency - Daily         Gauze 4x4 N/S 200 4x4s per unit [id number] ....2       Gauze Kerlix (fluff roll) 4inch sterile 561808 ....6       Tape   Tape. Mefix 2inch [id number] ...3 boxes

## 2025-05-07 ENCOUNTER — HOME CARE VISIT (OUTPATIENT)
Dept: HOME HEALTH SERVICES | Facility: HOME HEALTHCARE | Age: 65
End: 2025-05-07
Payer: MEDICARE

## 2025-05-07 VITALS
SYSTOLIC BLOOD PRESSURE: 96 MMHG | TEMPERATURE: 96.7 F | DIASTOLIC BLOOD PRESSURE: 62 MMHG | RESPIRATION RATE: 20 BRPM | HEART RATE: 84 BPM | OXYGEN SATURATION: 97 %

## 2025-05-07 PROCEDURE — G0299 HHS/HOSPICE OF RN EA 15 MIN: HCPCS

## 2025-05-07 RX ORDER — OXYCODONE HYDROCHLORIDE 15 MG/1
15 TABLET ORAL EVERY 6 HOURS PRN
Qty: 120 TABLET | Refills: 0 | OUTPATIENT
Start: 2025-05-07

## 2025-05-09 NOTE — TELEPHONE ENCOUNTER
[employer] [facility] [employer] called in regards to the matter, I have faxed the office notes and received confirmation.

## 2025-05-12 ENCOUNTER — HOME CARE VISIT (OUTPATIENT)
Dept: HOME HEALTH SERVICES | Facility: HOME HEALTHCARE | Age: 65
End: 2025-05-12
Payer: MEDICARE

## 2025-05-12 VITALS
OXYGEN SATURATION: 97 % | SYSTOLIC BLOOD PRESSURE: 114 MMHG | RESPIRATION RATE: 20 BRPM | TEMPERATURE: 98.7 F | DIASTOLIC BLOOD PRESSURE: 62 MMHG | HEART RATE: 96 BPM

## 2025-05-12 VITALS
OXYGEN SATURATION: 98 % | RESPIRATION RATE: 20 BRPM | SYSTOLIC BLOOD PRESSURE: 124 MMHG | TEMPERATURE: 97.6 F | HEART RATE: 88 BPM | DIASTOLIC BLOOD PRESSURE: 80 MMHG

## 2025-05-12 PROCEDURE — G0299 HHS/HOSPICE OF RN EA 15 MIN: HCPCS

## 2025-05-13 ENCOUNTER — TELEPHONE (OUTPATIENT)
Dept: GASTROENTEROLOGY | Facility: MEDICAL CENTER | Age: 65
End: 2025-05-13

## 2025-05-16 ENCOUNTER — HOME CARE VISIT (OUTPATIENT)
Dept: HOME HEALTH SERVICES | Facility: HOME HEALTHCARE | Age: 65
End: 2025-05-16
Payer: MEDICARE

## 2025-05-16 VITALS
HEART RATE: 84 BPM | RESPIRATION RATE: 16 BRPM | DIASTOLIC BLOOD PRESSURE: 84 MMHG | SYSTOLIC BLOOD PRESSURE: 120 MMHG | OXYGEN SATURATION: 99 % | TEMPERATURE: 99.3 F

## 2025-05-16 PROCEDURE — G0299 HHS/HOSPICE OF RN EA 15 MIN: HCPCS

## 2025-05-20 ENCOUNTER — OFFICE VISIT (OUTPATIENT)
Dept: FAMILY MEDICINE CLINIC | Facility: CLINIC | Age: 65
End: 2025-05-20
Payer: MEDICARE

## 2025-05-20 VITALS
BODY MASS INDEX: 28.19 KG/M2 | WEIGHT: 180 LBS | HEART RATE: 63 BPM | OXYGEN SATURATION: 98 % | DIASTOLIC BLOOD PRESSURE: 60 MMHG | SYSTOLIC BLOOD PRESSURE: 100 MMHG

## 2025-05-20 DIAGNOSIS — S24.102A: ICD-10-CM

## 2025-05-20 DIAGNOSIS — I10 BENIGN ESSENTIAL HYPERTENSION: ICD-10-CM

## 2025-05-20 DIAGNOSIS — R97.20 ELEVATED PROSTATE SPECIFIC ANTIGEN (PSA): ICD-10-CM

## 2025-05-20 DIAGNOSIS — Z13.820 SCREENING FOR OSTEOPOROSIS: ICD-10-CM

## 2025-05-20 DIAGNOSIS — E55.9 VITAMIN D DEFICIENCY: ICD-10-CM

## 2025-05-20 DIAGNOSIS — Z93.3 COLOSTOMY IN PLACE (HCC): ICD-10-CM

## 2025-05-20 DIAGNOSIS — K56.609 SBO (SMALL BOWEL OBSTRUCTION) (HCC): ICD-10-CM

## 2025-05-20 DIAGNOSIS — L89.223 STAGE III PRESSURE ULCER OF LEFT HIP (HCC): ICD-10-CM

## 2025-05-20 DIAGNOSIS — Z12.11 SCREEN FOR COLON CANCER: ICD-10-CM

## 2025-05-20 DIAGNOSIS — K55.069 MESENTERIC THROMBOSIS (HCC): ICD-10-CM

## 2025-05-20 DIAGNOSIS — M86.68 OTHER CHRONIC OSTEOMYELITIS, OTHER SITE (HCC): ICD-10-CM

## 2025-05-20 DIAGNOSIS — F11.20 CONTINUOUS OPIOID DEPENDENCE (HCC): ICD-10-CM

## 2025-05-20 DIAGNOSIS — E11.9 TYPE 2 DIABETES MELLITUS WITHOUT COMPLICATION, WITHOUT LONG-TERM CURRENT USE OF INSULIN (HCC): ICD-10-CM

## 2025-05-20 DIAGNOSIS — G82.20 PARAPLEGIC SPINAL PARALYSIS (HCC): ICD-10-CM

## 2025-05-20 DIAGNOSIS — N31.9 NEUROGENIC BLADDER: ICD-10-CM

## 2025-05-20 DIAGNOSIS — E46 PROTEIN MALNUTRITION (HCC): ICD-10-CM

## 2025-05-20 DIAGNOSIS — Z99.3 DEPENDENCE ON WHEELCHAIR: Primary | ICD-10-CM

## 2025-05-20 DIAGNOSIS — D64.9 ANEMIA, UNSPECIFIED TYPE: ICD-10-CM

## 2025-05-20 DIAGNOSIS — L89.324 STAGE IV PRESSURE ULCER OF LEFT BUTTOCK (HCC): ICD-10-CM

## 2025-05-20 DIAGNOSIS — Z93.59 SUPRAPUBIC CATHETER (HCC): ICD-10-CM

## 2025-05-20 DIAGNOSIS — S88.911A AMPUTATED RIGHT LEG (HCC): ICD-10-CM

## 2025-05-20 PROBLEM — Z45.2 PICC (PERIPHERALLY INSERTED CENTRAL CATHETER) IN PLACE: Status: RESOLVED | Noted: 2024-02-29 | Resolved: 2025-05-20

## 2025-05-20 PROBLEM — R19.7 DIARRHEA: Status: RESOLVED | Noted: 2018-09-21 | Resolved: 2025-05-20

## 2025-05-20 PROBLEM — M86.28 SUBACUTE OSTEOMYELITIS, OTHER SITE (HCC): Status: RESOLVED | Noted: 2023-12-22 | Resolved: 2025-05-20

## 2025-05-20 PROBLEM — R82.90 FOUL SMELLING URINE: Status: RESOLVED | Noted: 2023-08-17 | Resolved: 2025-05-20

## 2025-05-20 PROBLEM — K21.9 GERD (GASTROESOPHAGEAL REFLUX DISEASE): Status: ACTIVE | Noted: 2019-04-23

## 2025-05-20 PROCEDURE — 99205 OFFICE O/P NEW HI 60 MIN: CPT | Performed by: NURSE PRACTITIONER

## 2025-05-20 RX ORDER — PREGABALIN 50 MG/1
50 CAPSULE ORAL 2 TIMES DAILY
Qty: 60 CAPSULE | Refills: 3 | Status: SHIPPED | OUTPATIENT
Start: 2025-05-20

## 2025-05-20 NOTE — ASSESSMENT & PLAN NOTE
Has artificial leg  Wheelchair dependant   Needs new chair  [facility] [phone] Attn: richard   Has cecy olivera
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
## 2025-05-20 NOTE — ASSESSMENT & PLAN NOTE
Patient feels he has infection   UA ordered and culture     Orders:  •  Urinalysis with microscopic; Future  •  Urine culture; Future

## 2025-05-20 NOTE — ASSESSMENT & PLAN NOTE
Patient is on oxycodone therapy after multiple extensive surgery  Current chronic would which are being palliative cared for

## 2025-05-20 NOTE — ASSESSMENT & PLAN NOTE
Secondary to spinal tumor removal   For additional pain management we discussed the initiation of lyrica   Patient in agreement   Follow up in 8 weeks   Orders:  •  pregabalin (LYRICA) 50 mg capsule; Take 1 capsule (50 mg total) by mouth 2 (two) times a day

## 2025-05-20 NOTE — ASSESSMENT & PLAN NOTE
Patient reports he sees [facility] for eye exam- blind in right eye   A1C well controlled   Follow up labs ordered   Not on any medications     Lab Results   Component Value Date    HGBA1C 5.3 03/04/2025       Orders:  •  Lipid Panel with Direct LDL reflex; Future  •  Comprehensive metabolic panel  •  TSH, 3rd generation with Free T4 reflex; Future  •  Albumin / creatinine urine ratio; Future

## 2025-05-20 NOTE — ASSESSMENT & PLAN NOTE
Patient is able to provide self care of colostomy changes     Orders:  •  Ambulatory Referral to Gastroenterology; Future

## 2025-05-20 NOTE — ASSESSMENT & PLAN NOTE
Patient is taking vitamin b12   Most likely related to malnutrition     Orders:  •  CBC and differential; Future  •  Ferritin; Future  •  TIBC Panel (incl. Iron, TIBC, % Iron Saturation); Future

## 2025-05-21 ENCOUNTER — HOME CARE VISIT (OUTPATIENT)
Dept: HOME HEALTH SERVICES | Facility: HOME HEALTHCARE | Age: 65
End: 2025-05-21
Payer: MEDICARE

## 2025-05-21 VITALS
TEMPERATURE: 97.6 F | DIASTOLIC BLOOD PRESSURE: 62 MMHG | RESPIRATION RATE: 20 BRPM | HEART RATE: 94 BPM | OXYGEN SATURATION: 99 % | SYSTOLIC BLOOD PRESSURE: 102 MMHG

## 2025-05-21 PROCEDURE — G0299 HHS/HOSPICE OF RN EA 15 MIN: HCPCS

## 2025-05-22 ENCOUNTER — APPOINTMENT (OUTPATIENT)
Dept: LAB | Facility: HOSPITAL | Age: 65
End: 2025-05-22
Payer: MEDICARE

## 2025-05-22 DIAGNOSIS — Z93.59 SUPRAPUBIC CATHETER (HCC): ICD-10-CM

## 2025-05-22 DIAGNOSIS — N31.9 NEUROGENIC BLADDER: ICD-10-CM

## 2025-05-22 DIAGNOSIS — E46 PROTEIN MALNUTRITION (HCC): ICD-10-CM

## 2025-05-22 DIAGNOSIS — D64.9 ANEMIA, UNSPECIFIED TYPE: ICD-10-CM

## 2025-05-22 DIAGNOSIS — E11.9 TYPE 2 DIABETES MELLITUS WITHOUT COMPLICATION, WITHOUT LONG-TERM CURRENT USE OF INSULIN (HCC): ICD-10-CM

## 2025-05-22 LAB
25(OH)D3 SERPL-MCNC: 21.6 NG/ML (ref 30–100)
ALBUMIN SERPL BCG-MCNC: 3.4 G/DL (ref 3.5–5)
ALP SERPL-CCNC: 79 U/L (ref 34–104)
ALT SERPL W P-5'-P-CCNC: 11 U/L (ref 7–52)
ANION GAP SERPL CALCULATED.3IONS-SCNC: 7 MMOL/L (ref 4–13)
AST SERPL W P-5'-P-CCNC: 9 U/L (ref 13–39)
BACTERIA UR QL AUTO: ABNORMAL /HPF
BASOPHILS # BLD AUTO: 0.05 THOUSANDS/ÂΜL (ref 0–0.1)
BASOPHILS NFR BLD AUTO: 1 % (ref 0–1)
BILIRUB SERPL-MCNC: 0.27 MG/DL (ref 0.2–1)
BILIRUB UR QL STRIP: NEGATIVE
BUN SERPL-MCNC: 14 MG/DL (ref 5–25)
CALCIUM ALBUM COR SERPL-MCNC: 9.8 MG/DL (ref 8.3–10.1)
CALCIUM SERPL-MCNC: 9.3 MG/DL (ref 8.4–10.2)
CHLORIDE SERPL-SCNC: 104 MMOL/L (ref 96–108)
CHOLEST SERPL-MCNC: 116 MG/DL (ref ?–200)
CLARITY UR: ABNORMAL
CO2 SERPL-SCNC: 27 MMOL/L (ref 21–32)
COLOR UR: YELLOW
CREAT SERPL-MCNC: 0.46 MG/DL (ref 0.6–1.3)
CREAT UR-MCNC: 86.4 MG/DL
EOSINOPHIL # BLD AUTO: 0.16 THOUSAND/ÂΜL (ref 0–0.61)
EOSINOPHIL NFR BLD AUTO: 2 % (ref 0–6)
ERYTHROCYTE [DISTWIDTH] IN BLOOD BY AUTOMATED COUNT: 17.5 % (ref 11.6–15.1)
FERRITIN SERPL-MCNC: 72 NG/ML (ref 30–336)
GFR SERPL CREATININE-BSD FRML MDRD: 118 ML/MIN/1.73SQ M
GLUCOSE SERPL-MCNC: 86 MG/DL (ref 65–140)
GLUCOSE UR STRIP-MCNC: NEGATIVE MG/DL
HCT VFR BLD AUTO: 32.2 % (ref 36.5–49.3)
HDLC SERPL-MCNC: 37 MG/DL
HGB BLD-MCNC: 9.4 G/DL (ref 12–17)
HGB UR QL STRIP.AUTO: 150
HYALINE CASTS #/AREA URNS LPF: ABNORMAL /LPF
IMM GRANULOCYTES # BLD AUTO: 0.05 THOUSAND/UL (ref 0–0.2)
IMM GRANULOCYTES NFR BLD AUTO: 1 % (ref 0–2)
IRON SERPL-MCNC: <10 UG/DL (ref 50–212)
KETONES UR STRIP-MCNC: NEGATIVE MG/DL
LDLC SERPL CALC-MCNC: 66 MG/DL (ref 0–100)
LEUKOCYTE ESTERASE UR QL STRIP: 500
LYMPHOCYTES # BLD AUTO: 1.85 THOUSANDS/ÂΜL (ref 0.6–4.47)
LYMPHOCYTES NFR BLD AUTO: 18 % (ref 14–44)
MCH RBC QN AUTO: 22.6 PG (ref 26.8–34.3)
MCHC RBC AUTO-ENTMCNC: 29.2 G/DL (ref 31.4–37.4)
MCV RBC AUTO: 77 FL (ref 82–98)
MICROALBUMIN UR-MCNC: 132.8 MG/L
MICROALBUMIN/CREAT 24H UR: 154 MG/G CREATININE (ref 0–30)
MONOCYTES # BLD AUTO: 0.63 THOUSAND/ÂΜL (ref 0.17–1.22)
MONOCYTES NFR BLD AUTO: 6 % (ref 4–12)
MUCOUS THREADS UR QL AUTO: ABNORMAL
NEUTROPHILS # BLD AUTO: 7.73 THOUSANDS/ÂΜL (ref 1.85–7.62)
NEUTS SEG NFR BLD AUTO: 72 % (ref 43–75)
NITRITE UR QL STRIP: POSITIVE
NON-SQ EPI CELLS URNS QL MICRO: ABNORMAL /HPF
NRBC BLD AUTO-RTO: 0 /100 WBCS
PH UR STRIP.AUTO: 6 [PH]
PLATELET # BLD AUTO: 490 THOUSANDS/UL (ref 149–390)
PMV BLD AUTO: 9.3 FL (ref 8.9–12.7)
POTASSIUM SERPL-SCNC: 3.6 MMOL/L (ref 3.5–5.3)
PREALB SERPL-MCNC: 14.4 MG/DL (ref 17–34)
PROT SERPL-MCNC: 8.4 G/DL (ref 6.4–8.4)
PROT UR STRIP-MCNC: ABNORMAL MG/DL
RBC # BLD AUTO: 4.16 MILLION/UL (ref 3.88–5.62)
RBC #/AREA URNS AUTO: ABNORMAL /HPF
SODIUM SERPL-SCNC: 138 MMOL/L (ref 135–147)
SP GR UR STRIP.AUTO: 1.01 (ref 1–1.04)
TIBC SERPL-MCNC: 294 UG/DL (ref 250–450)
TRANSFERRIN SERPL-MCNC: 210 MG/DL (ref 203–362)
TRIGL SERPL-MCNC: 65 MG/DL (ref ?–150)
TSH SERPL DL<=0.05 MIU/L-ACNC: 1.03 UIU/ML (ref 0.45–4.5)
UROBILINOGEN UA: NEGATIVE MG/DL
VIT B12 SERPL-MCNC: 436 PG/ML (ref 180–914)
WBC # BLD AUTO: 10.47 THOUSAND/UL (ref 4.31–10.16)
WBC #/AREA URNS AUTO: ABNORMAL /HPF

## 2025-05-22 PROCEDURE — 83550 IRON BINDING TEST: CPT

## 2025-05-22 PROCEDURE — 80053 COMPREHEN METABOLIC PANEL: CPT | Performed by: NURSE PRACTITIONER

## 2025-05-22 PROCEDURE — 87077 CULTURE AEROBIC IDENTIFY: CPT

## 2025-05-22 PROCEDURE — 82043 UR ALBUMIN QUANTITATIVE: CPT

## 2025-05-22 PROCEDURE — 84134 ASSAY OF PREALBUMIN: CPT

## 2025-05-22 PROCEDURE — 80061 LIPID PANEL: CPT

## 2025-05-22 PROCEDURE — 82306 VITAMIN D 25 HYDROXY: CPT | Performed by: NURSE PRACTITIONER

## 2025-05-22 PROCEDURE — 87181 SC STD AGAR DILUTION PER AGT: CPT

## 2025-05-22 PROCEDURE — 36415 COLL VENOUS BLD VENIPUNCTURE: CPT | Performed by: NURSE PRACTITIONER

## 2025-05-22 PROCEDURE — 87186 SC STD MICRODIL/AGAR DIL: CPT

## 2025-05-22 PROCEDURE — 81001 URINALYSIS AUTO W/SCOPE: CPT

## 2025-05-22 PROCEDURE — 82570 ASSAY OF URINE CREATININE: CPT

## 2025-05-22 PROCEDURE — 82728 ASSAY OF FERRITIN: CPT

## 2025-05-22 PROCEDURE — 84443 ASSAY THYROID STIM HORMONE: CPT

## 2025-05-22 PROCEDURE — 85025 COMPLETE CBC W/AUTO DIFF WBC: CPT

## 2025-05-22 PROCEDURE — 83540 ASSAY OF IRON: CPT

## 2025-05-22 PROCEDURE — 82607 VITAMIN B-12: CPT | Performed by: NURSE PRACTITIONER

## 2025-05-22 PROCEDURE — 87086 URINE CULTURE/COLONY COUNT: CPT

## 2025-05-23 ENCOUNTER — HOME CARE VISIT (OUTPATIENT)
Dept: HOME HEALTH SERVICES | Facility: HOME HEALTHCARE | Age: 65
End: 2025-05-23
Payer: MEDICARE

## 2025-05-23 PROCEDURE — G0299 HHS/HOSPICE OF RN EA 15 MIN: HCPCS

## 2025-05-23 NOTE — CASE COMMUNICATION
Ship to Pt. Home XX       Wound 1 - Sacral wound - Full - xx Frequency - Daily       Cleansers  Tucker Romero 031030 ....1    Gauze 4x4 N/S 200 4x4s per unit 799704 ....2     ABD 5x9 202200 ......40     Tape   Tape. Mefix 2inch [id number] ...3 boxes     Calcium Alginates (In place of Aquacel or Maxsorb)   Alginate 4x4 713779 ....6        [employer]   16fr 5cc cath 575274....1     Syringe 10cc 3614239 ...1     10cc cath tray 691517 ...1        Ostomy include Brand and order number (ordered by the box) NOT STOCKED   [place] -xx   Pouches - New Image 2 piece - Ref # [id number] - 1 box

## 2025-05-23 NOTE — CASE COMMUNICATION
Ship to  Clinician  XX      Wound 1 - Sacral wound - Full - xx Frequency - Daily           Gauze 4x4 N/S 200 4x4s per unit 254553 ....2     Tape  Transpore white  2in 425872 ...1

## 2025-05-24 VITALS
OXYGEN SATURATION: 94 % | DIASTOLIC BLOOD PRESSURE: 54 MMHG | TEMPERATURE: 97.7 F | HEART RATE: 80 BPM | RESPIRATION RATE: 18 BRPM | SYSTOLIC BLOOD PRESSURE: 100 MMHG

## 2025-05-25 LAB — BACTERIA UR CULT: ABNORMAL

## 2025-05-28 ENCOUNTER — HOME CARE VISIT (OUTPATIENT)
Dept: HOME HEALTH SERVICES | Facility: HOME HEALTHCARE | Age: 65
End: 2025-05-28
Payer: MEDICARE

## 2025-05-28 PROCEDURE — G0299 HHS/HOSPICE OF RN EA 15 MIN: HCPCS

## 2025-05-28 PROCEDURE — 400014 VN F/U

## 2025-05-29 NOTE — CASE COMMUNICATION
60 day summary of care for DOS from 3/29/25 to 5/27/25-  Next certification period begins 5/28/25  Patient has been seen in the past 60 days for wound care management and instruction and sp dangelo care and management Focus of care for next 60 days for each discipline ordered: wound care primary suprapubic cath changes secondary Primary diagnoses/co-morbidities/recent procedures in past 60 days that impact current episode: stage 4 sacral  pressure ulcer Vital signs from 5/23/25 visit -  T 97.7 -  AP 80 and regular R 18  /54 - Pulse ox 94% Changes in patient condition in the last 60 days (i.e. labs, med changes, falls, assessment findings) include  New to [place] Changes to wound care orders in the past 60 days include  None Current wound measurements 9.5cm x 8.5cm x 3.5cm  with tunnnelling of 4.5cm at 10 o'clock and 2.5 cm at 2 o'clock Current description of wound a nd surrounding tissue  - wound bed  pink  and white - surrounding skin fragile, scar tissue and intact The patient remains homebound due to paraplegia/wheelchair bound - needs assist with ADL's The availability of a willing and able caregiver - wife -  [facility] Patient is unable to perform wound care due to location - wife can do on Tuesdays, Thursdays, Sat and Sundays -  unable to do on M-W-F du e to her work schedule  -  wife does not live with patient MD. appointments -  w[facility] 5/2/25 -  nex appointment 5/30/25 - Saw new pcp 5/20/25  Patient will require continued care for wound care.  All home health orders including treatments and visit order frequencies were reviewed and deemed appropriate at the time of this summary.

## 2025-05-30 ENCOUNTER — OFFICE VISIT (OUTPATIENT)
Dept: WOUND CARE | Facility: CLINIC | Age: 65
End: 2025-05-30
Payer: MEDICARE

## 2025-05-30 ENCOUNTER — HOME CARE VISIT (OUTPATIENT)
Dept: HOME HEALTH SERVICES | Facility: HOME HEALTHCARE | Age: 65
End: 2025-05-30
Payer: MEDICARE

## 2025-05-30 VITALS
SYSTOLIC BLOOD PRESSURE: 98 MMHG | DIASTOLIC BLOOD PRESSURE: 60 MMHG | TEMPERATURE: 97.1 F | RESPIRATION RATE: 18 BRPM | HEART RATE: 104 BPM

## 2025-05-30 DIAGNOSIS — G89.29 CHRONIC LOW BACK PAIN WITHOUT SCIATICA, UNSPECIFIED BACK PAIN LATERALITY: ICD-10-CM

## 2025-05-30 DIAGNOSIS — L89.154 STAGE IV PRESSURE ULCER OF SACRAL REGION (HCC): Primary | ICD-10-CM

## 2025-05-30 DIAGNOSIS — M86.68 OTHER CHRONIC OSTEOMYELITIS, OTHER SITE (HCC): ICD-10-CM

## 2025-05-30 DIAGNOSIS — F11.20 CONTINUOUS OPIOID DEPENDENCE (HCC): ICD-10-CM

## 2025-05-30 DIAGNOSIS — G82.20 PARAPLEGIC SPINAL PARALYSIS (HCC): ICD-10-CM

## 2025-05-30 DIAGNOSIS — M54.50 CHRONIC LOW BACK PAIN WITHOUT SCIATICA, UNSPECIFIED BACK PAIN LATERALITY: ICD-10-CM

## 2025-05-30 PROCEDURE — 11042 DBRDMT SUBQ TIS 1ST 20SQCM/<: CPT | Performed by: SURGERY

## 2025-05-30 PROCEDURE — 11045 DBRDMT SUBQ TISS EACH ADDL: CPT | Performed by: SURGERY

## 2025-05-30 NOTE — ASSESSMENT & PLAN NOTE
I think the wounds have increased in size since I saw him last especially inferiorly and the area under the connecting skin bridge.  The wounds were debrided.  Continue the same care.  I explained to him he needs to ensure he is appropriately offloading to prevent these areas from deteriorating.

## 2025-05-30 NOTE — PROGRESS NOTES
Wound Procedure Treatment Pressure Injury Sacrum    Performed by: Jackie Villalpando RN  Authorized by: Tex Yan MD  Associated wounds:   Wound 04/19/24 Pressure Injury Sacrum    Applied to periwound:  Skin prep   Applied primary dressing:  Calcium alginate   Applied secondary dressing:  Gauze and ABD   Dressing secured with:  Tape

## 2025-05-30 NOTE — PATIENT INSTRUCTIONS
Orders Placed This Encounter   Procedures    Wound cleansing and dressings Pressure Injury Sacrum     Wound cleansing and dressings Pressure Injury Sacrum         Wash your hands with soap and water.    Remove old dressing, discard into plastic bag and place in trash.    Cleanse the wound with Mild Soap & Water or normal saline prior to applying a clean dressing.   Do not use tissue or cotton balls. Do not scrub the wound. Pat dry using gauze.        Sacral wound:  Skin prep to gaby-wound  Apply Calcium Alginate to all wound surfaces and under skin bridge followed by fluffed 4 x 4's to keep alginate in place.   Cover with ABD's.   Secure with medfix tape.   Change dressings daily and as needed for excessive drainage         OFF-LOADING   Avoid pressure at wound site. - all wounds, including right back   Use Wheelchair Cushion. - ROHO cushion      Do Not Sit for Long Periods of Time. - 1 hr max for meals only, otherwise in bed and turn side to side at least   every 3 hours or more frequently         Reposition at least every 1-2 hours while awake.          Continue VNA three x per week (wife will do in between) for dressing changes, except on the day the patient goes to the wound center.             Follow up in 4 weeks with Dr. Yan     Standing Status:   Future     Expiration Date:   6/6/2025

## 2025-05-30 NOTE — PROGRESS NOTES
"Patient ID: Rikki Arguello is a 65 y.o. male Date of Birth 1960     Chief Complaint  Chief Complaint   Patient presents with    Follow Up Wound Care Visit       Allergies  Patient has no known allergies.    Assessment:    Type 2 diabetes mellitus without complication, without long-term current use of insulin ([facility])    Lab Results   Component Value Date    HGBA1C 5.3 03/04/2025       Stage IV pressure ulcer of sacral region (HCC)  I think the wounds have increased in size since I saw him last especially inferiorly and the area under the connecting skin bridge.  The wounds were debrided.  Continue the same care.  I explained to him he needs to ensure he is appropriately offloading to prevent these areas from deteriorating.                           Diagnoses and all orders for this visit:    Stage IV pressure ulcer of sacral region (HCC)  -     Wound cleansing and dressings Pressure Injury Sacrum; Future  -     Wound Procedure Treatment Pressure Injury Sacrum    Paraplegic spinal paralysis (HCC)    Other orders  -     Debridement                Debridement   Wound 04/19/24 Pressure Injury Sacrum     Date/Time: 5/30/2025 10:00 AM    Universal Protocol:  Consent: Verbal consent obtained  Consent given by: patient  Time out: Immediately prior to procedure a \"time out\" was called to verify the correct patient, procedure, equipment, support staff and site/side marked as required.    Debridement Details  Performed by: physician  Debridement type: surgical  Level of debridement: subcutaneous tissue  Pain control: lidocaine 4%    Post-debridement measurements  Length (cm): 11  Width (cm): 9  Depth (cm): 2.7  Percent debrided: 60%  Surface Area (cm^2): 77.75  Area Debrided (cm^2): 46.65  Volume (cm^3): 139.96    Tissue and other material debrided: subcutaneous tissue  Devitalized tissue debrided: biofilm and slough  Instrument(s) utilized: curette  Procedural pain (0-10): insensate  Post-procedural pain: insensate "   Response to treatment: procedure was tolerated well        Plan:     Wound 04/19/24 Pressure Injury Sacrum (Active)   Wound Image Images linked 05/30/25 1031   Wound Description Probes to bone;Pink;Epithelialization;Slough 05/30/25 1004   Pressure Injury Stage 4 05/30/25 1004   Non-staged Wound Description Full thickness 05/30/25 1004   Wound Length (cm) 11 cm 05/30/25 1004   Wound Width (cm) 9 cm 05/30/25 1004   Wound Depth (cm) 2.7 cm 05/30/25 1004   Wound Surface Area (cm^2) 77.75 cm^2 05/30/25 1004   Wound Volume (cm^3) 139.958 cm^3 05/30/25 1004   Calculated Wound Volume (cm^3) 267.3 cm^3 05/30/25 1004   Change in Wound Size % -301.95 05/30/25 1004   Undermining 1 5 05/30/25 1004   Undermining 1 is depth extending from 9 o clock to 11 o clock deepest at 10 o clock 05/30/25 1004   Drainage Amount Copious 05/30/25 1004   Drainage Description Tan;Serosanguineous;Yellow;Foul smelling 05/30/25 1004   Shelby-wound Assessment Intact;Scar Tissue;Maceration 05/30/25 1004       Wound 04/19/24 Pressure Injury Sacrum (Active)   Date First Assessed: 04/19/24   Primary Wound Type: Pressure Injury  Location: Sacrum  Wound Outcome: Palliative       [REMOVED] Wound 08/26/19 Buttocks Left;Distal;Lateral (Removed)   Resolved Date/Resolved Time: 08/26/20 1056  Date First Assessed/Time First Assessed: 08/26/19 1130   Pre-Existing Wound: Yes  Location: Buttocks  Wound Location Orientation: Left;Distal;Lateral  Wound Description (Comments): Deep ischial wound       [REMOVED] Wound 09/16/19 Buttocks Right;Distal (Removed)   Resolved Date/Resolved Time: 08/26/20 1055  Date First Assessed/Time First Assessed: 09/16/19 1657   Pre-Existing Wound: Yes  Location: Buttocks  Wound Location Orientation: Right;Distal       [REMOVED] Wound 10/28/19 Knee Left (Removed)   Resolved Date/Resolved Time: 08/26/20 1055  Date First Assessed/Time First Assessed: 10/28/19 0657   Pre-Existing Wound: Yes  Location: Knee  Wound Location Orientation: Left   Wound Description (Comments): round black  Dressing Status: Open to air       [REMOVED] Wound 10/28/19 Buttocks Left;Mid (Removed)   Resolved Date/Resolved Time: 08/26/20 1056  Date First Assessed/Time First Assessed: 10/28/19 1108   Pre-Existing Wound: Yes  Location: Buttocks  Wound Location Orientation: Left;Mid  Wound Description (Comments): Stage 2 vs traumatic injury       [REMOVED] Wound 11/01/19 Other (Comment) N/A (Removed)   Resolved Date/Resolved Time: 08/26/20 1055  Date First Assessed/Time First Assessed: 11/01/19 1640   Location: Other (Comment)  Wound Location Orientation: N/A  Wound Description (Comments): scrotum dressed with exofin       [REMOVED] Wound 08/26/20 Pressure Injury Buttocks Left (Removed)   Resolved Date: 04/19/24  Date First Assessed/Time First Assessed: 08/26/20 1056   Primary Wound Type: Pressure Injury  Location: Buttocks  Wound Location Orientation: Left  Wound Outcome: (c) Converged       [REMOVED] Wound 08/26/20 Pressure Injury Hip Left (Removed)   Resolved Date: 10/04/23  Date First Assessed/Time First Assessed: 08/26/20 1057   Primary Wound Type: Pressure Injury  Location: Hip  Wound Location Orientation: Left  Wound Outcome: Palliative       [REMOVED] Wound 07/29/21 Pressure Injury Back Right;Lower;Lateral (Removed)   Resolved Date/Resolved Time: 01/23/24 1934  Date First Assessed: 07/29/21   Primary Wound Type: Pressure Injury  Location: Back  Wound Location Orientation: Right;Lower;Lateral  Wound Outcome: Healed       [REMOVED] Wound 11/05/21 Pressure Injury Sacrum (Removed)   Resolved Date: 04/19/24  Date First Assessed/Time First Assessed: 11/05/21 1059   Present on Original Admission: Yes  Primary Wound Type: Pressure Injury  Location: Sacrum  Wound Outcome: (c) Palliative       [REMOVED] Wound 05/27/22 Skin Tear Buttocks Left;Proximal (Removed)   Resolved Date/Resolved Time: 08/05/22 0851  Date First Assessed/Time First Assessed: 05/27/22 0946   Primary Wound Type: Skin  Tear  Location: Buttocks  Wound Location Orientation: Left;Proximal  Wound Outcome: Healed       [REMOVED] Wound 03/17/23 Pressure Injury Hip Lateral;Left;Proximal (Removed)   Resolved Date: 08/21/23  Date First Assessed/Time First Assessed: 03/17/23 1039   Primary Wound Type: Pressure Injury  Location: Hip  Wound Location Orientation: Lateral;Left;Proximal  Wound Outcome: Healed       [REMOVED] Wound 06/19/23 Pressure Injury Hip Left;Medial (Removed)   Resolved Date: 09/18/23  Date First Assessed: 06/19/23   Present on Original Admission: No  Primary Wound Type: Pressure Injury  Location: Hip  Wound Location Orientation: Left;Medial  Wound Description (Comments): granulation  yellow serous drainage ...       [REMOVED] Wound 10/13/23 Pressure Injury Finger (Comment which one) Right (Removed)   Resolved Date: 11/20/23  Date First Assessed/Time First Assessed: 10/13/23 1035   Primary Wound Type: Pressure Injury  Location: Finger (Comment which one)  Wound Location Orientation: Right  Wound Outcome: Healed       [REMOVED] Wound 01/17/24 Pressure Injury Flank Right;Upper (Removed)   Resolved Date: 02/02/24  Date First Assessed/Time First Assessed: 01/17/24 1926   Present on Original Admission: Yes  Primary Wound Type: Pressure Injury  Location: Flank  Wound Location Orientation: Right;Upper       [REMOVED] Wound 01/17/24 Knee Anterior;Left (Removed)   Resolved Date: 02/02/24  Date First Assessed/Time First Assessed: 01/17/24 1930   Location: Knee  Wound Location Orientation: Anterior;Left       [REMOVED] Wound 01/18/24 Pressure Injury Hip Left (Removed)   Resolved Date: 08/09/24  Date First Assessed/Time First Assessed: 01/18/24 1137   Primary Wound Type: Pressure Injury  Location: Hip  Wound Location Orientation: Left  Wound Outcome: Healed       [REMOVED] Wound 01/25/24 Chest Right (Removed)   Resolved Date: 03/15/24  Date First Assessed/Time First Assessed: 01/25/24 1205   Location: Chest  Wound Location Orientation:  Right  Wound Description (Comments): addy [id number] negative pressure wound therapy system  Incision's 1st Dressing: DRESSING ...       [REMOVED] Wound 09/30/24 Skin Tear Hip Left (Removed)   Resolved Date: 03/14/25  Date First Assessed: 09/30/24   Primary Wound Type: Skin Tear  Location: Hip  Wound Location Orientation: Left  Wound Description (Comments): pink gran pc of skin peeled back   Incision's 1st Dressing: cleansed with saline pat...       [REMOVED] Wound 04/14/25 Hip Left;Superior (Removed)   Resolved Date: 05/23/25  Date First Assessed: 04/14/25   Present on Original Admission: No  Location: Hip  Wound Location Orientation: Left;Superior       [REMOVED] Wound 04/14/25 Hip Left;Mid (Removed)   Resolved Date: 05/02/25  Date First Assessed: 04/14/25   Present on Original Admission: No  Location: Hip  Wound Location Orientation: Left;Mid  Wound Outcome: Healed       [REMOVED] Wound 04/14/25 Hip Distal;Left (Removed)   Resolved Date: 05/23/25  Date First Assessed: 04/14/25   Present on Original Admission: No  Location: Hip  Wound Location Orientation: Distal;Left  Wound Outcome: Healed       Subjective:      .    Mr. Arguello is a 61-year-old gentleman with paraplegia and history of multiple pressure wounds with 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.  He had been rescheduled for a debridement and flap closure by plastics in August but developed a new wound on his right mid to lower back chest wall.  This wound probes deep and has been closed on the outside but the tract has not been improving.  He had a CT scan that shows a deep tracking to the muscle but no fistulization to the internal thoracic cavity.    02/04/2022 he returns now for re-evaluation.  He still has had visiting nurses but has not been seen in the Wound Center since November.  He denies any change or complaint    03/11/2022 no changes since been seen last.  No new complaints.    04/22/2022 no issues.  No changes.    05/27/2022.  Doing  well.  Denies fevers or chills.  No issues spoke about plastics a re-evaluation but he wants to wait for COVID to wane more and maybe next fall    07/22/2022.  He has not been seen here since May mostly due to transportation issues.  He has significant more pain and drainage on his right back chest wall wound.  Otherwise no changes    08/05/2022 denies fevers or chills.  Still in pain on the right side.    3/17/2023 he returns for evaluation.  He was seen in the wound center by another provider month or 2 ago.    6/9/2023.  Here for long-term follow-up.  No significant changes.    7/14/2023.  No changes.  Doing well.    9/22/2023 he had an outpatient CT scan.  The right chest wall wound has increasing drainage.  The dressings from the sacral area have some greenish color    10/13/2023.  He did receive a VAC.  He still has greenish drainage on the dressings    10/27/2023 no significant changes.  He states he did get his hospital mattress.  Tolerating the VAC well.    12/8/2023.  He has had significant creasing drainage from the VAC dressing from the right back chest wall wound.  Difficulty maintaining enough canisters for the VAC machine.    12/22/2023 he denies any change.  Denies any fevers or chills.  Denies any issues.    4/19/2024 he returns now for resumption of care.  Since being seen last he did get admitted to [facility] and underwent extensive debridement of the chest wall wound rib resection with latissimus dorsi flap closure.  That wound did very well and the wound is healed.  He is here for evaluation of his chronic sacral and hip wounds.    5/31/2024.  Overall is doing well.  He was seen by plastics and his right side is healing well.  He is complain of more pain today from the sacrum.  Denies fevers or chills.  Visit nurses and his wife are doing dressing changes      6/20/2024.  Note complaints or changes since seen last.    8/9/2024 no issues.  No changes.  Still the same dressing  changes.    9/20/2024.  He reports no changes.  Doing well.  No new wounds.    10/18/2024 still doing very well.  Doing dressing changes without difficulty.  No new issues.    11/15/2024 no new issues no changes.    2/14/2025 overall doing very well.  Tolerating dressing changes with difficulty.  Denies fevers or chills.    5/30/2025 overall he reports no significant changes.              The following portions of the patient's history were reviewed and updated as appropriate: allergies, current medications, past family history, past medical history, past social history, past surgical history and problem list.    Review of Systems   Constitutional:  Negative for chills and fever.   HENT:  Negative for ear pain and sore throat.    Eyes:  Negative for pain and visual disturbance.   Respiratory:  Negative for cough and shortness of breath.    Cardiovascular:  Negative for chest pain.   Gastrointestinal:  Negative for abdominal pain and vomiting.   Skin:  Positive for wound. Negative for color change.   Neurological:  Negative for seizures and syncope.   Psychiatric/Behavioral:  Negative for agitation and behavioral problems.    All other systems reviewed and are negative.        Objective:       Wound 04/19/24 Pressure Injury Sacrum (Active)   Wound Image Images linked 05/30/25 1031   Wound Description Probes to bone;Pink;Epithelialization;Slough 05/30/25 1004   Pressure Injury Stage 4 05/30/25 1004   Non-staged Wound Description Full thickness 05/30/25 1004   Wound Length (cm) 11 cm 05/30/25 1004   Wound Width (cm) 9 cm 05/30/25 1004   Wound Depth (cm) 2.7 cm 05/30/25 1004   Wound Surface Area (cm^2) 77.75 cm^2 05/30/25 1004   Wound Volume (cm^3) 139.958 cm^3 05/30/25 1004   Calculated Wound Volume (cm^3) 267.3 cm^3 05/30/25 1004   Change in Wound Size % -301.95 05/30/25 1004   Undermining 1 5 05/30/25 1004   Undermining 1 is depth extending from 9 o clock to 11 o clock deepest at 10 o clock 05/30/25 1004   Drainage  Amount Copious 05/30/25 1004   Drainage Description Tan;Serosanguineous;Yellow;Foul smelling 05/30/25 1004   Gaby-wound Assessment Intact;Scar Tissue;Maceration 05/30/25 1004       BP 98/60   Pulse 104   Temp (!) 97.1 °F (36.2 °C)   Resp 18     Physical Exam  Vitals and nursing note reviewed. Exam conducted with a chaperone present.   Constitutional:       Appearance: Normal appearance.     Cardiovascular:      Rate and Rhythm: Normal rate and regular rhythm.   Pulmonary:      Effort: Pulmonary effort is normal.      Breath sounds: Normal breath sounds.   Abdominal:      General: There is no distension.      Palpations: Abdomen is soft. There is no mass.      Tenderness: There is no abdominal tenderness.      Comments: Ostomy     Neurological:      Mental Status: He is alert.     Psychiatric:         Mood and Affect: Mood normal.         Behavior: Behavior normal.           Wound Instructions:  Orders Placed This Encounter   Procedures    Wound cleansing and dressings Pressure Injury Sacrum     Wound cleansing and dressings Pressure Injury Sacrum         Wash your hands with soap and water.    Remove old dressing, discard into plastic bag and place in trash.    Cleanse the wound with Mild Soap & Water or normal saline prior to applying a clean dressing.   Do not use tissue or cotton balls. Do not scrub the wound. Pat dry using gauze.        Sacral wound:  Skin prep to gaby-wound  Apply Calcium Alginate to all wound surfaces and under skin bridge followed by fluffed 4 x 4's to keep alginate in place.   Cover with ABD's.   Secure with medfix tape.   Change dressings daily and as needed for excessive drainage         OFF-LOADING   Avoid pressure at wound site. - all wounds, including right back   Use Wheelchair Cushion. - ROHO cushion      Do Not Sit for Long Periods of Time. - 1 hr max for meals only, otherwise in bed and turn side to side at least   every 3 hours or more frequently         Reposition at least  every 1-2 hours while awake.          Continue VNA three x per week (wife will do in between) for dressing changes, except on the day the patient goes to the wound center.             Follow up in 4 weeks with Dr. Yan     Standing Status:   Future     Expiration Date:   6/6/2025    Wound Procedure Treatment Pressure Injury Sacrum     This order was created via procedure documentation    Debridement     This order was created via procedure documentation        Diagnosis ICD-10-CM Associated Orders   1. Stage IV pressure ulcer of sacral region ([facility])  L89.154 Wound cleansing and dressings Pressure Injury Sacrum     Wound Procedure Treatment Pressure Injury Sacrum      2. Paraplegic spinal paralysis ([facility])  G82.20

## 2025-05-31 VITALS
DIASTOLIC BLOOD PRESSURE: 68 MMHG | OXYGEN SATURATION: 98 % | RESPIRATION RATE: 20 BRPM | HEART RATE: 90 BPM | SYSTOLIC BLOOD PRESSURE: 100 MMHG | TEMPERATURE: 97.1 F

## 2025-06-03 NOTE — TELEPHONE ENCOUNTER
Patient is calling to check the status of this refill request, can they be sent over today?    Patient also had multiple abnormal results, can his labs be reviewed?    Please advise

## 2025-06-04 ENCOUNTER — HOME CARE VISIT (OUTPATIENT)
Dept: HOME HEALTH SERVICES | Facility: HOME HEALTHCARE | Age: 65
End: 2025-06-04
Payer: MEDICARE

## 2025-06-04 VITALS
HEART RATE: 94 BPM | OXYGEN SATURATION: 96 % | RESPIRATION RATE: 20 BRPM | SYSTOLIC BLOOD PRESSURE: 90 MMHG | DIASTOLIC BLOOD PRESSURE: 60 MMHG | TEMPERATURE: 97.7 F

## 2025-06-04 PROCEDURE — G0299 HHS/HOSPICE OF RN EA 15 MIN: HCPCS

## 2025-06-04 RX ORDER — OXYCODONE HYDROCHLORIDE 15 MG/1
15 TABLET ORAL EVERY 6 HOURS PRN
Qty: 120 TABLET | Refills: 0 | Status: SHIPPED | OUTPATIENT
Start: 2025-06-04

## 2025-06-04 NOTE — TELEPHONE ENCOUNTER
Patient called to check the status of his refill. Patient advised unfortunately the refill has not been approved by pcp at this time. Patient stated he is currently out of medication at this time and is asking if pcp could please call in the medication as soon as possible. Please advise.

## 2025-06-05 ENCOUNTER — HOME CARE VISIT (OUTPATIENT)
Dept: HOME HEALTH SERVICES | Facility: HOME HEALTHCARE | Age: 65
End: 2025-06-05
Payer: MEDICARE

## 2025-06-05 NOTE — CASE COMMUNICATION
Ship to  [facility]   XX     MD ordering -  Dr. Yan      Wound 1 - Sacral wound - Full - xx Frequency - Daily         Tape  [facility] [id number] ...1 box

## 2025-06-06 ENCOUNTER — HOME CARE VISIT (OUTPATIENT)
Dept: HOME HEALTH SERVICES | Facility: HOME HEALTHCARE | Age: 65
End: 2025-06-06
Payer: MEDICARE

## 2025-06-06 VITALS
RESPIRATION RATE: 20 BRPM | OXYGEN SATURATION: 94 % | HEART RATE: 88 BPM | TEMPERATURE: 98.4 F | SYSTOLIC BLOOD PRESSURE: 92 MMHG | DIASTOLIC BLOOD PRESSURE: 56 MMHG

## 2025-06-06 DIAGNOSIS — M54.50 CHRONIC LOW BACK PAIN WITHOUT SCIATICA, UNSPECIFIED BACK PAIN LATERALITY: ICD-10-CM

## 2025-06-06 DIAGNOSIS — M86.68 OTHER CHRONIC OSTEOMYELITIS, OTHER SITE (HCC): ICD-10-CM

## 2025-06-06 DIAGNOSIS — G89.29 CHRONIC LOW BACK PAIN WITHOUT SCIATICA, UNSPECIFIED BACK PAIN LATERALITY: ICD-10-CM

## 2025-06-06 DIAGNOSIS — K21.9 GASTROESOPHAGEAL REFLUX DISEASE WITHOUT ESOPHAGITIS: ICD-10-CM

## 2025-06-06 PROCEDURE — G0299 HHS/HOSPICE OF RN EA 15 MIN: HCPCS

## 2025-06-06 RX ORDER — ASPIRIN 81 MG/1
81 TABLET ORAL DAILY
Qty: 90 TABLET | Refills: 0 | Status: SHIPPED | OUTPATIENT
Start: 2025-06-06

## 2025-06-06 RX ORDER — ACETAMINOPHEN 325 MG/1
650 TABLET ORAL EVERY 6 HOURS PRN
Qty: 40 TABLET | Refills: 0 | Status: SHIPPED | OUTPATIENT
Start: 2025-06-06

## 2025-06-06 RX ORDER — IBUPROFEN 800 MG/1
800 TABLET, FILM COATED ORAL EVERY 8 HOURS PRN
Qty: 60 TABLET | Refills: 0 | Status: SHIPPED | OUTPATIENT
Start: 2025-06-06

## 2025-06-06 RX ORDER — OMEPRAZOLE 20 MG/1
20 CAPSULE, DELAYED RELEASE ORAL
Qty: 90 CAPSULE | Refills: 0 | Status: SHIPPED | OUTPATIENT
Start: 2025-06-06 | End: 2026-06-01

## 2025-06-08 NOTE — CASE COMMUNICATION
Ship to  Pt. Home  XX      Ordering MD - Dr. Tex Yan    Wound 1 L hip -  2 wounds    Full - XX      Frequency - 3x week      Sub for Mepilex:  Silicone Foam with border 4x4 NOT STOCKED [id number] ....20

## 2025-06-11 ENCOUNTER — HOME CARE VISIT (OUTPATIENT)
Dept: HOME HEALTH SERVICES | Facility: HOME HEALTHCARE | Age: 65
End: 2025-06-11
Payer: MEDICARE

## 2025-06-11 VITALS
HEART RATE: 72 BPM | SYSTOLIC BLOOD PRESSURE: 100 MMHG | DIASTOLIC BLOOD PRESSURE: 70 MMHG | TEMPERATURE: 97.4 F | RESPIRATION RATE: 24 BRPM | OXYGEN SATURATION: 94 %

## 2025-06-11 PROCEDURE — G0299 HHS/HOSPICE OF RN EA 15 MIN: HCPCS

## 2025-06-13 ENCOUNTER — HOME CARE VISIT (OUTPATIENT)
Dept: HOME HEALTH SERVICES | Facility: HOME HEALTHCARE | Age: 65
End: 2025-06-13
Payer: MEDICARE

## 2025-06-13 VITALS
OXYGEN SATURATION: 98 % | DIASTOLIC BLOOD PRESSURE: 86 MMHG | RESPIRATION RATE: 22 BRPM | SYSTOLIC BLOOD PRESSURE: 118 MMHG | TEMPERATURE: 97.4 F | HEART RATE: 72 BPM

## 2025-06-13 PROCEDURE — G0299 HHS/HOSPICE OF RN EA 15 MIN: HCPCS

## 2025-06-16 ENCOUNTER — HOME CARE VISIT (OUTPATIENT)
Dept: HOME HEALTH SERVICES | Facility: HOME HEALTHCARE | Age: 65
End: 2025-06-16
Payer: MEDICARE

## 2025-06-18 ENCOUNTER — RESULTS FOLLOW-UP (OUTPATIENT)
Dept: FAMILY MEDICINE CLINIC | Facility: CLINIC | Age: 65
End: 2025-06-18

## 2025-06-18 ENCOUNTER — HOME CARE VISIT (OUTPATIENT)
Dept: HOME HEALTH SERVICES | Facility: HOME HEALTHCARE | Age: 65
End: 2025-06-18
Payer: MEDICARE

## 2025-06-18 VITALS
DIASTOLIC BLOOD PRESSURE: 54 MMHG | TEMPERATURE: 96.8 F | OXYGEN SATURATION: 95 % | RESPIRATION RATE: 20 BRPM | SYSTOLIC BLOOD PRESSURE: 106 MMHG | HEART RATE: 72 BPM

## 2025-06-18 DIAGNOSIS — N30.00 ACUTE CYSTITIS WITHOUT HEMATURIA: ICD-10-CM

## 2025-06-18 DIAGNOSIS — D50.9 MICROCYTIC ANEMIA: Primary | ICD-10-CM

## 2025-06-18 PROCEDURE — G0299 HHS/HOSPICE OF RN EA 15 MIN: HCPCS

## 2025-06-18 RX ORDER — SULFAMETHOXAZOLE AND TRIMETHOPRIM 800; 160 MG/1; MG/1
1 TABLET ORAL 2 TIMES DAILY
Qty: 14 TABLET | Refills: 0 | Status: SHIPPED | OUTPATIENT
Start: 2025-06-18 | End: 2025-06-25

## 2025-06-18 RX ORDER — MULTIVIT-MIN/IRON/FOLIC ACID/K 18-600-40
1 CAPSULE ORAL DAILY
Qty: 90 CAPSULE | Refills: 3 | Status: SHIPPED | OUTPATIENT
Start: 2025-06-18

## 2025-06-18 RX ORDER — CALCIUM CARBONATE/VITAMIN D3 600 MG-10
250 TABLET ORAL DAILY
Qty: 90 TABLET | Refills: 3 | Status: SHIPPED | OUTPATIENT
Start: 2025-06-18 | End: 2025-08-04 | Stop reason: SDUPTHER

## 2025-06-19 ENCOUNTER — HOME CARE VISIT (OUTPATIENT)
Dept: HOME HEALTH SERVICES | Facility: HOME HEALTHCARE | Age: 65
End: 2025-06-19
Payer: MEDICARE

## 2025-06-19 NOTE — CASE COMMUNICATION
Ship to  Home XX    Ordering MD - Dr. Bong Solomon supplies    Leg bags ....Bard - Dispoz-A-Bag....Leb bag with Flip-Jayesh drainaing valve -   Reference #  [id number] -  Large 32oz.....3

## 2025-06-20 ENCOUNTER — HOME CARE VISIT (OUTPATIENT)
Dept: HOME HEALTH SERVICES | Facility: HOME HEALTHCARE | Age: 65
End: 2025-06-20
Payer: MEDICARE

## 2025-06-20 PROCEDURE — G0299 HHS/HOSPICE OF RN EA 15 MIN: HCPCS

## 2025-06-21 VITALS
SYSTOLIC BLOOD PRESSURE: 108 MMHG | HEART RATE: 74 BPM | RESPIRATION RATE: 24 BRPM | TEMPERATURE: 96.1 F | OXYGEN SATURATION: 95 % | DIASTOLIC BLOOD PRESSURE: 80 MMHG

## 2025-06-25 ENCOUNTER — HOME CARE VISIT (OUTPATIENT)
Dept: HOME HEALTH SERVICES | Facility: HOME HEALTHCARE | Age: 65
End: 2025-06-25
Payer: MEDICARE

## 2025-06-25 PROCEDURE — G0299 HHS/HOSPICE OF RN EA 15 MIN: HCPCS

## 2025-06-26 ENCOUNTER — HOME CARE VISIT (OUTPATIENT)
Dept: HOME HEALTH SERVICES | Facility: HOME HEALTHCARE | Age: 65
End: 2025-06-26
Payer: MEDICARE

## 2025-06-26 ENCOUNTER — TELEPHONE (OUTPATIENT)
Dept: GASTROENTEROLOGY | Facility: AMBULARY SURGERY CENTER | Age: 65
End: 2025-06-26

## 2025-06-26 VITALS
DIASTOLIC BLOOD PRESSURE: 68 MMHG | HEART RATE: 90 BPM | OXYGEN SATURATION: 95 % | RESPIRATION RATE: 20 BRPM | SYSTOLIC BLOOD PRESSURE: 96 MMHG | TEMPERATURE: 96.3 F

## 2025-06-26 DIAGNOSIS — G89.29 CHRONIC LOW BACK PAIN WITHOUT SCIATICA, UNSPECIFIED BACK PAIN LATERALITY: ICD-10-CM

## 2025-06-26 DIAGNOSIS — F11.20 CONTINUOUS OPIOID DEPENDENCE (HCC): ICD-10-CM

## 2025-06-26 DIAGNOSIS — M86.68 OTHER CHRONIC OSTEOMYELITIS, OTHER SITE (HCC): ICD-10-CM

## 2025-06-26 DIAGNOSIS — M54.50 CHRONIC LOW BACK PAIN WITHOUT SCIATICA, UNSPECIFIED BACK PAIN LATERALITY: ICD-10-CM

## 2025-06-26 NOTE — CASE COMMUNICATION
Ship to    Pt. Home  XX      Ordering MD  -  Dr. Yan    Wound 1 -  Sacrum -  Full XX -   Frequency - daily          Cleansers  Tucker Romero [id number] ....1      Gauze Kerlix (fluff roll) 4inch sterile [id number] ....8      Tape  Tape. Mefix 2inch [id number] ...3 boxes      Calcium Alginates    Alginate 4x4 [id number] ....8

## 2025-06-26 NOTE — LETTER
June 26, 2025    Rikki RANDAL Arguello  [address]   [address]  [facility] [address]      Dear Mr. Arguello:      COLONOSCOPY  MIRALAX/Dulcolax Bowel Preparation Instructions    The OR/GI Lab will contact you the evening prior to your procedure with your exact arrival time.    Our practice requires a 1 week notice for any cancellations or rescheduling. We kindly ask that you immediately notify us of any changes including any new medications that are prescribed. Thank you for your cooperation.     WEEK BEFORE YOUR PROCEDURE:  Stop taking Iron tablets.  5 days prior, AVOID vegetables and fruits with skins or seeds, nuts, corn, popcorn and whole grain breads.   Purchase: One (1) 238-gram container of Miralax (polyethylene glycol 3350), four (4) 5 mg Dulcolax (bisacodyl) tablets, and one (1) 64-ounce bottle of Gatorade (sports drink) - no red, orange, or purple. These may be purchased at any pharmacy without a prescription. Generic products are permissible.   Arrange responsible transportation for day of the procedure.     DAY BEFORE THE PROCEDURE:   CLEAR liquids only for entire day prior. Nothing red, orange or purple.    You MAY have:                                                               Soda  Water  Broth Gatorade  Jello  Popsicles Coffee/tea without milk/creamer     YOU MAY NOT HAVE:  Solid foods   Milk and milk products    Juice with pulp    BOWEL PREPARATION:  Includes: One (1) 238-gram container of Miralax (polyethylene glycol 3350), four (4) 5 mg Dulcolax (bisacodyl) tablets, and one (1) 64-ounce bottle of Gatorade (sports drink).  Preparation may be refrigerated.  Entire bowel prep should be completed.     Afternoon before the procedure (2:00 pm - 5:00 pm):    Take two (2) 5 mg Dulcolax laxative tablets.     Evening before the procedure (6:00 pm):  Mix entire container of Miralax with one (1) 64-ounce bottle of Gatorade and shake until all medication is dissolved.   Begin drinking solution. Drink an  eight (8) ounce cup every 10-15 minutes until you have consumed half (32 ounces) of the solution.  Refrigerate remaining solution.    Night before the procedure (8:00 pm):  Take two (2) 5 mg Dulcolax laxative tablets.     Beginning 5 hours before your procedure:  Drink the remaining amount of prepared solution (32 ounces).  Drink an eight (8) ounce cup every 10-15 minutes until you have consumed the remaining solution.     Bowel prep should be completed 4 hours prior to procedure time.    NOTHING TO EAT OR DRINK AFTER MIDNIGHT- EXCEPT FOR YOUR PREP    DAY OF THE PROCEDURE:  You may brush your teeth.  Leave all jewelry at home.  Please arrive for your procedure as indicated by the OR / GI Lab / Endoscopy Unit. The hospital will contact you the day before with your exact arrival time.   Make sure you have arranged ahead of time for a responsible adult (18 or older) to accompany and drive you home after the procedure.  Please discuss any transportation concerns with our staff prior to your procedure.    The effects of the anesthesia can persist for 24 hours.  After receiving the sedation, you must exercise caution before engaging in any activity that could harm yourself and others (such as driving a car).  Do not make any important decisions or do not drink any alcoholic beverages during this time period.  After your procedure, you may have anything you'd like to eat or drink.  You will probably want to start with something light.  Please include plenty of fluids.  Avoid items that cause gas such as sodas and salads.    SPECIAL INSTRUCTIONS:    For patients currently taking blood thinners and/or antiplatelet therapy our office will contact the prescribing provider.  Our office will contact you with any required changes to your medication regimen.     Blood thinner (i.e. - Coumadin, Pradaxa, Lovenox, Xarelto, Eliquis)  ?  Continue (Do Not Stop)  ? Stop______________for_____________days prior to the  procedure.    Antiplatelet (i.e. - Plavix, Aggrenox, Effient, Brilinta)  ?  Continue (Do Not Stop)  ? Stop______________for_____________days prior to the procedure.       Diabetes:   If you are Diabetic, please see separate Diabetic Instruction Sheet.          Prescribed medications:  Do not stop your aspirin, or any of your other medications (unless instructed otherwise).    Take the rest of your prescribed medications with small sips of water at least 2 hours prior to your procedure.      For any questions or concerns related to your bowel preparation or pre-procedure instructions, please contact our office at [phone].  Thank you for choosing [facility]!

## 2025-06-26 NOTE — CASE COMMUNICATION
Ship to   Clinician XX      Ordering MD  -  Dr. Yan    Wound 1 -  Sacrum -  Full XX -   Frequency - daily          Tape  Transpore white  2in [id number] ....1

## 2025-06-26 NOTE — TELEPHONE ENCOUNTER
Scheduled date of colonoscopy (as of today): 7/15/25  Physician performing colonoscopy: RIC  Location of colonoscopy:   Bowel prep reviewed with patient: JOVITA / ILENE  Instructions reviewed with patient by: ROSHNI  Clearances:  N/A

## 2025-06-26 NOTE — TELEPHONE ENCOUNTER
06/26/25  Screened by: Khalida Farley    Referring Provider     Pre- Screening:     There is no height or weight on file to calculate BMI.  Has patient been referred for a routine screening Colonoscopy? yes  Is the patient between 45-75 years old? yes      Previous Colonoscopy yes   If yes:    Date: 1/2023    Facility:     Reason:     Does the patient want to see a Gastroenterologist prior to their procedure OR are they having any GI symptoms?     Has the patient been hospitalized or had abdominal surgery in the past 6 months? no    Does the patient use supplemental oxygen? no    Does the patient take Coumadin, Lovenox, Plavix, Elliquis, Xarelto, or other blood thinning medication? no    Has the patient had a stroke, cardiac event, or stent placed in the past year? no    If patient is between 45yrs - 49yrs, please advise patient that we will have to confirm benefits & coverage with their insurance company for a routine screening colonoscopy.

## 2025-06-27 ENCOUNTER — HOME CARE VISIT (OUTPATIENT)
Dept: HOME HEALTH SERVICES | Facility: HOME HEALTHCARE | Age: 65
End: 2025-06-27
Payer: MEDICARE

## 2025-06-27 ENCOUNTER — OFFICE VISIT (OUTPATIENT)
Dept: WOUND CARE | Facility: CLINIC | Age: 65
End: 2025-06-27
Payer: MEDICARE

## 2025-06-27 VITALS
HEART RATE: 80 BPM | TEMPERATURE: 96.6 F | DIASTOLIC BLOOD PRESSURE: 64 MMHG | RESPIRATION RATE: 18 BRPM | SYSTOLIC BLOOD PRESSURE: 96 MMHG

## 2025-06-27 DIAGNOSIS — L89.154 STAGE IV PRESSURE ULCER OF SACRAL REGION (HCC): Primary | ICD-10-CM

## 2025-06-27 DIAGNOSIS — G82.20 PARAPLEGIC SPINAL PARALYSIS (HCC): ICD-10-CM

## 2025-06-27 PROCEDURE — 11045 DBRDMT SUBQ TISS EACH ADDL: CPT | Performed by: SURGERY

## 2025-06-27 PROCEDURE — 11042 DBRDMT SUBQ TIS 1ST 20SQCM/<: CPT | Performed by: SURGERY

## 2025-06-27 RX ORDER — LIDOCAINE HYDROCHLORIDE 40 MG/ML
5 SOLUTION TOPICAL ONCE
Status: COMPLETED | OUTPATIENT
Start: 2025-06-27 | End: 2025-06-27

## 2025-06-27 RX ADMIN — LIDOCAINE HYDROCHLORIDE 5 ML: 40 SOLUTION TOPICAL at 09:45

## 2025-06-27 NOTE — PROGRESS NOTES
"Patient ID: Rikki Arguello is a 65 y.o. male Date of Birth 1960     Chief Complaint  Chief Complaint   Patient presents with    Follow Up Wound Care Visit       Allergies  Patient has no known allergies.    Assessment:    Stage IV pressure ulcer of sacral region (HCC)  The wounds have improved in the last month.  Less drainage less odor.  The base was debrided with a curette to clean up some nonviable and biofilm.  Continue the same care.  Follow-up in a month                             Diagnoses and all orders for this visit:    Stage IV pressure ulcer of sacral region (HCC)  -     Wound cleansing and dressings Pressure Injury Sacrum; Future  -     lidocaine (XYLOCAINE) 4 % topical solution 5 mL  -     Wound Procedure Treatment Pressure Injury Sacrum    Paraplegic spinal paralysis (HCC)    Other orders  -     Debridement                Debridement     Date/Time: 6/27/2025 10:00 AM    Universal Protocol:  Consent: Verbal consent obtained  Consent given by: patient  Time out: Immediately prior to procedure a \"time out\" was called to verify the correct patient, procedure, equipment, support staff and site/side marked as required.    Debridement Details  Performed by: physician  Debridement type: surgical  Level of debridement: subcutaneous tissue  Pain control: lidocaine 4%    Post-debridement measurements  Length (cm): 9  Width (cm): 10  Depth (cm): 2.9  Percent debrided: 50%  Surface Area (cm^2): 70.69  Area Debrided (cm^2): 35.35  Volume (cm^3): 136.66    Tissue and other material debrided: subcutaneous tissue  Devitalized tissue debrided: biofilm and slough  Instrument(s) utilized: curette  Procedural pain (0-10): insensate  Post-procedural pain: insensate   Response to treatment: procedure was tolerated well        Plan:     Wound 04/19/24 Pressure Injury Sacrum (Active)   Wound Image Images linked 06/27/25 1024   Wound Description Probes to bone;Pink;Epithelialization;Slough;Gray;Yellow 06/27/25 0945 "   Pressure Injury Stage 4 06/27/25 0945   Non-staged Wound Description Full thickness 06/27/25 0945   Wound Length (cm) 9 cm 06/27/25 0945   Wound Width (cm) 10 cm 06/27/25 0945   Wound Depth (cm) 2.9 cm 06/27/25 0945   Wound Surface Area (cm^2) 70.69 cm^2 06/27/25 0945   Wound Volume (cm^3) 136.659 cm^3 06/27/25 0945   Calculated Wound Volume (cm^3) 261 cm^3 06/27/25 0945   Change in Wound Size % -292.48 06/27/25 0945   Undermining 1 5.8 06/27/25 0945   Undermining 1 is depth extending from 6 o clock to 11 o clock deepest at 5 o clock 06/27/25 0945   Drainage Amount Large 06/27/25 0945   Drainage Description Tan;Serosanguineous;Yellow 06/27/25 0945   Shelby-wound Assessment Intact;Scar Tissue;Maceration 06/27/25 0945       [REMOVED] Wound 06/06/25 Hip Left;Superior (Removed)   Wound Image Images linked 06/27/25 0942   Wound Description Epithelialization;Pink 06/27/25 0951   Wound Length (cm) 0 cm 06/27/25 0951   Wound Width (cm) 0 cm 06/27/25 0951   Wound Depth (cm) 0 cm 06/27/25 0951   Wound Surface Area (cm^2) 0 cm^2 06/27/25 0951   Wound Volume (cm^3) 0 cm^3 06/27/25 0951   Calculated Wound Volume (cm^3) 0 cm^3 06/27/25 0951   Change in Wound Size % 100 06/27/25 0951   Drainage Amount None 06/27/25 0951   Shelby-wound Assessment Dry;Scar Tissue 06/27/25 0951       [REMOVED] Wound 06/05/25 Hip Left;Inferior (Removed)   Wound Image Images linked 06/27/25 0943   Wound Description Epithelialization;Pink 06/27/25 0951   Wound Length (cm) 0 cm 06/27/25 0951   Wound Width (cm) 0 cm 06/27/25 0951   Wound Depth (cm) 0 cm 06/27/25 0951   Wound Surface Area (cm^2) 0 cm^2 06/27/25 0951   Wound Volume (cm^3) 0 cm^3 06/27/25 0951   Calculated Wound Volume (cm^3) 0 cm^3 06/27/25 0951   Change in Wound Size % 100 06/27/25 0951   Drainage Amount None 06/27/25 0951   Shelby-wound Assessment Dry;Scar Tissue 06/27/25 0951       Wound 04/19/24 Pressure Injury Sacrum (Active)   Date First Assessed: 04/19/24   Primary Wound Type: Pressure  Injury  Location: Sacrum  Wound Outcome: Palliative       [REMOVED] Wound 08/26/19 Buttocks Left;Distal;Lateral (Removed)   Resolved Date/Resolved Time: 08/26/20 1056  Date First Assessed/Time First Assessed: 08/26/19 1130   Pre-Existing Wound: Yes  Location: Buttocks  Wound Location Orientation: Left;Distal;Lateral  Wound Description (Comments): Deep ischial wound       [REMOVED] Wound 09/16/19 Buttocks Right;Distal (Removed)   Resolved Date/Resolved Time: 08/26/20 1055  Date First Assessed/Time First Assessed: 09/16/19 1657   Pre-Existing Wound: Yes  Location: Buttocks  Wound Location Orientation: Right;Distal       [REMOVED] Wound 10/28/19 Knee Left (Removed)   Resolved Date/Resolved Time: 08/26/20 1055  Date First Assessed/Time First Assessed: 10/28/19 0657   Pre-Existing Wound: Yes  Location: Knee  Wound Location Orientation: Left  Wound Description (Comments): round black  Dressing Status: Open to air       [REMOVED] Wound 10/28/19 Buttocks Left;Mid (Removed)   Resolved Date/Resolved Time: 08/26/20 1056  Date First Assessed/Time First Assessed: 10/28/19 1108   Pre-Existing Wound: Yes  Location: Buttocks  Wound Location Orientation: Left;Mid  Wound Description (Comments): Stage 2 vs traumatic injury       [REMOVED] Wound 11/01/19 Other (Comment) N/A (Removed)   Resolved Date/Resolved Time: 08/26/20 1055  Date First Assessed/Time First Assessed: 11/01/19 1640   Location: Other (Comment)  Wound Location Orientation: N/A  Wound Description (Comments): scrotum dressed with exofin       [REMOVED] Wound 08/26/20 Pressure Injury Buttocks Left (Removed)   Resolved Date: 04/19/24  Date First Assessed/Time First Assessed: 08/26/20 1056   Primary Wound Type: Pressure Injury  Location: Buttocks  Wound Location Orientation: Left  Wound Outcome: (c) Converged       [REMOVED] Wound 08/26/20 Pressure Injury Hip Left (Removed)   Resolved Date: 10/04/23  Date First Assessed/Time First Assessed: 08/26/20 1057   Primary Wound  Type: Pressure Injury  Location: Hip  Wound Location Orientation: Left  Wound Outcome: Palliative       [REMOVED] Wound 07/29/21 Pressure Injury Back Right;Lower;Lateral (Removed)   Resolved Date/Resolved Time: 01/23/24 1934  Date First Assessed: 07/29/21   Primary Wound Type: Pressure Injury  Location: Back  Wound Location Orientation: Right;Lower;Lateral  Wound Outcome: Healed       [REMOVED] Wound 11/05/21 Pressure Injury Sacrum (Removed)   Resolved Date: 04/19/24  Date First Assessed/Time First Assessed: 11/05/21 1059   Present on Original Admission: Yes  Primary Wound Type: Pressure Injury  Location: Sacrum  Wound Outcome: (c) Palliative       [REMOVED] Wound 05/27/22 Skin Tear Buttocks Left;Proximal (Removed)   Resolved Date/Resolved Time: 08/05/22 0851  Date First Assessed/Time First Assessed: 05/27/22 0946   Primary Wound Type: Skin Tear  Location: Buttocks  Wound Location Orientation: Left;Proximal  Wound Outcome: Healed       [REMOVED] Wound 03/17/23 Pressure Injury Hip Lateral;Left;Proximal (Removed)   Resolved Date: 08/21/23  Date First Assessed/Time First Assessed: 03/17/23 1039   Primary Wound Type: Pressure Injury  Location: Hip  Wound Location Orientation: Lateral;Left;Proximal  Wound Outcome: Healed       [REMOVED] Wound 06/19/23 Pressure Injury Hip Left;Medial (Removed)   Resolved Date: 09/18/23  Date First Assessed: 06/19/23   Present on Original Admission: No  Primary Wound Type: Pressure Injury  Location: Hip  Wound Location Orientation: Left;Medial  Wound Description (Comments): granulation  yellow serous drainage ...       [REMOVED] Wound 10/13/23 Pressure Injury Finger (Comment which one) Right (Removed)   Resolved Date: 11/20/23  Date First Assessed/Time First Assessed: 10/13/23 1035   Primary Wound Type: Pressure Injury  Location: Finger (Comment which one)  Wound Location Orientation: Right  Wound Outcome: Healed       [REMOVED] Wound 01/17/24 Pressure Injury Flank Right;Upper (Removed)    Resolved Date: 02/02/24  Date First Assessed/Time First Assessed: 01/17/24 1926   Present on Original Admission: Yes  Primary Wound Type: Pressure Injury  Location: Flank  Wound Location Orientation: Right;Upper       [REMOVED] Wound 01/17/24 Knee Anterior;Left (Removed)   Resolved Date: 02/02/24  Date First Assessed/Time First Assessed: 01/17/24 1930   Location: Knee  Wound Location Orientation: Anterior;Left       [REMOVED] Wound 01/18/24 Pressure Injury Hip Left (Removed)   Resolved Date: 08/09/24  Date First Assessed/Time First Assessed: 01/18/24 1137   Primary Wound Type: Pressure Injury  Location: Hip  Wound Location Orientation: Left  Wound Outcome: Healed       [REMOVED] Wound 01/25/24 Chest Right (Removed)   Resolved Date: 03/15/24  Date First Assessed/Time First Assessed: 01/25/24 1205   Location: Chest  Wound Location Orientation: Right  Wound Description (Comments): addy [id number] negative pressure wound therapy system  Incision's 1st Dressing: DRESSING ...       [REMOVED] Wound 09/30/24 Skin Tear Hip Left (Removed)   Resolved Date: 03/14/25  Date First Assessed: 09/30/24   Primary Wound Type: Skin Tear  Location: Hip  Wound Location Orientation: Left  Wound Description (Comments): pink gran pc of skin peeled back   Incision's 1st Dressing: cleansed with saline pat...       [REMOVED] Wound 04/14/25 Hip Left;Superior (Removed)   Resolved Date: 05/23/25  Date First Assessed: 04/14/25   Present on Original Admission: No  Location: Hip  Wound Location Orientation: Left;Superior       [REMOVED] Wound 04/14/25 Hip Left;Mid (Removed)   Resolved Date: 05/02/25  Date First Assessed: 04/14/25   Present on Original Admission: No  Location: Hip  Wound Location Orientation: Left;Mid  Wound Outcome: Healed       [REMOVED] Wound 04/14/25 Hip Distal;Left (Removed)   Resolved Date: 05/23/25  Date First Assessed: 04/14/25   Present on Original Admission: No  Location: Hip  Wound Location Orientation: Distal;Left  Wound  Outcome: Healed       [REMOVED] Wound 06/06/25 Hip Left;Superior (Removed)   Resolved Date: 06/27/25  Date First Assessed: 06/06/25   Present on Original Admission: No  Location: Hip  Wound Location Orientation: Left;Superior  Wound Outcome: Healed       [REMOVED] Wound 06/05/25 Hip Left;Inferior (Removed)   Resolved Date: 06/27/25  Date First Assessed: 06/05/25   Present on Original Admission: No  Location: Hip  Wound Location Orientation: Left;Inferior  Wound Outcome: Healed       Subjective:      .    Mr. Arguello is a 61-year-old gentleman with paraplegia and history of multiple pressure wounds with 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.  He had been rescheduled for a debridement and flap closure by plastics in August but developed a new wound on his right mid to lower back chest wall.  This wound probes deep and has been closed on the outside but the tract has not been improving.  He had a CT scan that shows a deep tracking to the muscle but no fistulization to the internal thoracic cavity.    02/04/2022 he returns now for re-evaluation.  He still has had visiting nurses but has not been seen in the Wound Center since November.  He denies any change or complaint    03/11/2022 no changes since been seen last.  No new complaints.    04/22/2022 no issues.  No changes.    05/27/2022.  Doing well.  Denies fevers or chills.  No issues spoke about plastics a re-evaluation but he wants to wait for COVID to wane more and maybe next fall    07/22/2022.  He has not been seen here since May mostly due to transportation issues.  He has significant more pain and drainage on his right back chest wall wound.  Otherwise no changes    08/05/2022 denies fevers or chills.  Still in pain on the right side.    3/17/2023 he returns for evaluation.  He was seen in the wound center by another provider month or 2 ago.    6/9/2023.  Here for long-term follow-up.  No significant changes.    7/14/2023.  No changes.  Doing  well.    9/22/2023 he had an outpatient CT scan.  The right chest wall wound has increasing drainage.  The dressings from the sacral area have some greenish color    10/13/2023.  He did receive a VAC.  He still has greenish drainage on the dressings    10/27/2023 no significant changes.  He states he did get his hospital mattress.  Tolerating the VAC well.    12/8/2023.  He has had significant creasing drainage from the VAC dressing from the right back chest wall wound.  Difficulty maintaining enough canisters for the VAC machine.    12/22/2023 he denies any change.  Denies any fevers or chills.  Denies any issues.    4/19/2024 he returns now for resumption of care.  Since being seen last he did get admitted to [facility] and underwent extensive debridement of the chest wall wound rib resection with latissimus dorsi flap closure.  That wound did very well and the wound is healed.  He is here for evaluation of his chronic sacral and hip wounds.    5/31/2024.  Overall is doing well.  He was seen by plastics and his right side is healing well.  He is complain of more pain today from the sacrum.  Denies fevers or chills.  Visit nurses and his wife are doing dressing changes      6/20/2024.  Note complaints or changes since seen last.    8/9/2024 no issues.  No changes.  Still the same dressing changes.    9/20/2024.  He reports no changes.  Doing well.  No new wounds.    10/18/2024 still doing very well.  Doing dressing changes without difficulty.  No new issues.    11/15/2024 no new issues no changes.    2/14/2025 overall doing very well.  Tolerating dressing changes with difficulty.  Denies fevers or chills.    5/30/2025 overall he reports no significant changes.    6/27/2025 doing well.  No complaints.  No changes              The following portions of the patient's history were reviewed and updated as appropriate: allergies, current medications, past family history, past medical history, past social  history, past surgical history and problem list.    Review of Systems   Constitutional:  Negative for chills and fever.   HENT:  Negative for ear pain and sore throat.    Eyes:  Negative for pain and visual disturbance.   Respiratory:  Negative for cough and shortness of breath.    Cardiovascular:  Negative for chest pain.   Gastrointestinal:  Negative for abdominal pain and vomiting.   Skin:  Positive for wound. Negative for color change.   Neurological:  Negative for seizures and syncope.   Psychiatric/Behavioral:  Negative for agitation and behavioral problems.    All other systems reviewed and are negative.        Objective:       Wound 04/19/24 Pressure Injury Sacrum (Active)   Wound Image Images linked 06/27/25 1024   Wound Description Probes to bone;Pink;Epithelialization;Slough;Gray;Yellow 06/27/25 0945   Pressure Injury Stage 4 06/27/25 0945   Non-staged Wound Description Full thickness 06/27/25 0945   Wound Length (cm) 9 cm 06/27/25 0945   Wound Width (cm) 10 cm 06/27/25 0945   Wound Depth (cm) 2.9 cm 06/27/25 0945   Wound Surface Area (cm^2) 70.69 cm^2 06/27/25 0945   Wound Volume (cm^3) 136.659 cm^3 06/27/25 0945   Calculated Wound Volume (cm^3) 261 cm^3 06/27/25 0945   Change in Wound Size % -292.48 06/27/25 0945   Undermining 1 5.8 06/27/25 0945   Undermining 1 is depth extending from 6 o clock to 11 o clock deepest at 5 o clock 06/27/25 0945   Drainage Amount Large 06/27/25 0945   Drainage Description Tan;Serosanguineous;Yellow 06/27/25 0945   Shelby-wound Assessment Intact;Scar Tissue;Maceration 06/27/25 0945       [REMOVED] Wound 06/06/25 Hip Left;Superior (Removed)   Wound Image Images linked 06/27/25 0942   Wound Description Epithelialization;Pink 06/27/25 0951   Wound Length (cm) 0 cm 06/27/25 0951   Wound Width (cm) 0 cm 06/27/25 0951   Wound Depth (cm) 0 cm 06/27/25 0951   Wound Surface Area (cm^2) 0 cm^2 06/27/25 0951   Wound Volume (cm^3) 0 cm^3 06/27/25 0951   Calculated Wound Volume (cm^3) 0  cm^3 06/27/25 0951   Change in Wound Size % 100 06/27/25 0951   Drainage Amount None 06/27/25 0951   Gaby-wound Assessment Dry;Scar Tissue 06/27/25 0951       [REMOVED] Wound 06/05/25 Hip Left;Inferior (Removed)   Wound Image Images linked 06/27/25 0943   Wound Description Epithelialization;Pink 06/27/25 0951   Wound Length (cm) 0 cm 06/27/25 0951   Wound Width (cm) 0 cm 06/27/25 0951   Wound Depth (cm) 0 cm 06/27/25 0951   Wound Surface Area (cm^2) 0 cm^2 06/27/25 0951   Wound Volume (cm^3) 0 cm^3 06/27/25 0951   Calculated Wound Volume (cm^3) 0 cm^3 06/27/25 0951   Change in Wound Size % 100 06/27/25 0951   Drainage Amount None 06/27/25 0951   Gaby-wound Assessment Dry;Scar Tissue 06/27/25 0951       BP 96/64   Pulse 80   Temp (!) 96.6 °F (35.9 °C)   Resp 18     Physical Exam  Vitals and nursing note reviewed. Exam conducted with a chaperone present.   Constitutional:       Appearance: Normal appearance.     Cardiovascular:      Rate and Rhythm: Normal rate and regular rhythm.   Pulmonary:      Effort: Pulmonary effort is normal.      Breath sounds: Normal breath sounds.   Abdominal:      General: There is no distension.      Palpations: Abdomen is soft. There is no mass.      Tenderness: There is no abdominal tenderness.      Comments: Ostomy     Neurological:      Mental Status: He is alert.     Psychiatric:         Mood and Affect: Mood normal.         Behavior: Behavior normal.           Wound Instructions:  Orders Placed This Encounter   Procedures    Wound cleansing and dressings Pressure Injury Sacrum     Wound cleansing and dressings Pressure Injury Sacrum          Wash your hands with soap and water.    Remove old dressing, discard into plastic bag and place in trash.    Cleanse the wound with Mild Soap & Water or normal saline prior to applying a clean dressing.   Do not use tissue or cotton balls. Do not scrub the wound. Pat dry using gauze.        Sacral wound:  Skin prep to gaby-wound  Apply  Calcium Alginate to all wound surfaces and under skin bridge followed by fluffed 4 x 4's to keep alginate in place.   Cover with ABD's.   Secure with medfix tape.   Change dressings daily and as needed for excessive drainage         OFF-LOADING   Avoid pressure at wound site. - all wounds, including right back   Use Wheelchair Cushion. - ROHO cushion      Do Not Sit for Long Periods of Time. - 1 hr max for meals only, otherwise in bed and turn side to side at least   every 3 hours or more frequently         Reposition at least every 1-2 hours while awake.          Continue VNA three x per week (wife will do in between) for dressing changes, except on the day the patient goes to the wound center.             Follow up in 4 weeks with Dr. Yan     Standing Status:   Future     Expiration Date:   7/4/2025    Wound Procedure Treatment Pressure Injury Sacrum     This order was created via procedure documentation    Debridement     This order was created via procedure documentation        Diagnosis ICD-10-CM Associated Orders   1. Stage IV pressure ulcer of sacral region ([facility])  L89.154 Wound cleansing and dressings Pressure Injury Sacrum     lidocaine (XYLOCAINE) 4 % topical solution 5 mL     Wound Procedure Treatment Pressure Injury Sacrum      2. Paraplegic spinal paralysis ([facility])  G82.20

## 2025-06-27 NOTE — PROGRESS NOTES
Wound Procedure Treatment Pressure Injury Sacrum    Performed by: Jackie Villalpando RN  Authorized by: Tex Yan MD  Associated wounds:   Wound 04/19/24 Pressure Injury Sacrum    Wound cleansed with:  NSS   Applied to periwound:  Skin prep   Applied primary dressing:  Calcium alginate   Applied secondary dressing:  Gauze and ABD   Dressing secured with:  Tape

## 2025-06-27 NOTE — PATIENT INSTRUCTIONS
Orders Placed This Encounter   Procedures    Wound cleansing and dressings Pressure Injury Sacrum     Wound cleansing and dressings Pressure Injury Sacrum          Wash your hands with soap and water.    Remove old dressing, discard into plastic bag and place in trash.    Cleanse the wound with Mild Soap & Water or normal saline prior to applying a clean dressing.   Do not use tissue or cotton balls. Do not scrub the wound. Pat dry using gauze.        Sacral wound:  Skin prep to gaby-wound  Apply Calcium Alginate to all wound surfaces and under skin bridge followed by fluffed 4 x 4's to keep alginate in place.   Cover with ABD's.   Secure with medfix tape.   Change dressings daily and as needed for excessive drainage         OFF-LOADING   Avoid pressure at wound site. - all wounds, including right back   Use Wheelchair Cushion. - ROHO cushion      Do Not Sit for Long Periods of Time. - 1 hr max for meals only, otherwise in bed and turn side to side at least   every 3 hours or more frequently         Reposition at least every 1-2 hours while awake.          Continue VNA three x per week (wife will do in between) for dressing changes, except on the day the patient goes to the wound center.             Follow up in 4 weeks with Dr. Yan     Standing Status:   Future     Expiration Date:   7/4/2025      
No

## 2025-06-27 NOTE — ASSESSMENT & PLAN NOTE
The wounds have improved in the last month.  Less drainage less odor.  The base was debrided with a curette to clean up some nonviable and biofilm.  Continue the same care.  Follow-up in a month

## 2025-06-30 RX ORDER — OXYCODONE HYDROCHLORIDE 15 MG/1
15 TABLET ORAL EVERY 6 HOURS PRN
Qty: 120 TABLET | Refills: 0 | Status: SHIPPED | OUTPATIENT
Start: 2025-06-30

## 2025-07-01 ENCOUNTER — OFFICE VISIT (OUTPATIENT)
Dept: FAMILY MEDICINE CLINIC | Facility: CLINIC | Age: 65
End: 2025-07-01
Payer: MEDICARE

## 2025-07-01 VITALS — SYSTOLIC BLOOD PRESSURE: 100 MMHG | OXYGEN SATURATION: 96 % | HEART RATE: 83 BPM | DIASTOLIC BLOOD PRESSURE: 70 MMHG

## 2025-07-01 DIAGNOSIS — N30.00 ACUTE CYSTITIS WITHOUT HEMATURIA: Primary | ICD-10-CM

## 2025-07-01 DIAGNOSIS — E55.9 VITAMIN D DEFICIENCY: ICD-10-CM

## 2025-07-01 DIAGNOSIS — G82.20 PARAPLEGIC SPINAL PARALYSIS (HCC): ICD-10-CM

## 2025-07-01 DIAGNOSIS — D50.9 MICROCYTIC ANEMIA: ICD-10-CM

## 2025-07-01 PROCEDURE — 99214 OFFICE O/P EST MOD 30 MIN: CPT | Performed by: NURSE PRACTITIONER

## 2025-07-01 PROCEDURE — G2211 COMPLEX E/M VISIT ADD ON: HCPCS | Performed by: NURSE PRACTITIONER

## 2025-07-01 RX ORDER — PREGABALIN 100 MG/1
100 CAPSULE ORAL 2 TIMES DAILY
Qty: 60 CAPSULE | Refills: 3 | Status: SHIPPED | OUTPATIENT
Start: 2025-07-01

## 2025-07-01 NOTE — PROGRESS NOTES
Name: Rikki Arguello      : 1960      MRN: [id number]  Encounter Provider: KALPANA Verduzco  Encounter Date: 2025   Encounter department: [facility]  :  Assessment & Plan  Paraplegic spinal paralysis (HCC)  Patient has not noticed much change in pain with lyrica 50mg   Increase to 100mg twice per day   Follow up as scheduled   Forms were completed a few days ago after our office received a fax regarding prescription for power chair     Orders:  •  pregabalin (LYRICA) 100 mg capsule; Take 1 capsule (100 mg total) by mouth 2 (two) times a day    Acute cystitis without hematuria  Patient completed antibiotics and is reports urine is no longer cloudy        Vitamin D deficiency  Recent vitamin d level 21   Now taking 2000 units daily        Microcytic anemia  Recent ferritin level is 72 and normal                   History of Present Illness   Patient presents today because he states that [employer] told  him he needs to have me sign forms for the final step to get his new power chair     Patient states that his wife arrives very late and sometimes and he doesn't get his dressing changed. His VNA services were reduced to twice per week instead of three times per week so there are times which he is not having his dressing changed for over a day.   Wife lives separate form the home     We are also here to review his labs      Review of Systems   Constitutional:  Negative for appetite change.   Respiratory: Negative.     Cardiovascular: Negative.        Objective   /70 (BP Location: Right arm, Patient Position: Sitting, Cuff Size: Standard)   Pulse 83   SpO2 96%      Physical Exam  Vitals and nursing note reviewed.   Constitutional:       Appearance: Normal appearance.      Comments: Presents in chair      Cardiovascular:      Rate and Rhythm: Regular rhythm.      Heart sounds: Normal heart sounds.   Pulmonary:      Breath sounds: Normal breath sounds.      Neurological:      Mental Status: He is alert and oriented to person, place, and time.

## 2025-07-02 ENCOUNTER — HOME CARE VISIT (OUTPATIENT)
Dept: HOME HEALTH SERVICES | Facility: HOME HEALTHCARE | Age: 65
End: 2025-07-02
Payer: MEDICARE

## 2025-07-02 DIAGNOSIS — G89.29 CHRONIC LOW BACK PAIN WITHOUT SCIATICA, UNSPECIFIED BACK PAIN LATERALITY: ICD-10-CM

## 2025-07-02 DIAGNOSIS — M54.50 CHRONIC LOW BACK PAIN WITHOUT SCIATICA, UNSPECIFIED BACK PAIN LATERALITY: ICD-10-CM

## 2025-07-02 DIAGNOSIS — K21.9 GASTROESOPHAGEAL REFLUX DISEASE WITHOUT ESOPHAGITIS: ICD-10-CM

## 2025-07-02 DIAGNOSIS — M86.68 OTHER CHRONIC OSTEOMYELITIS, OTHER SITE (HCC): ICD-10-CM

## 2025-07-02 PROCEDURE — G0299 HHS/HOSPICE OF RN EA 15 MIN: HCPCS

## 2025-07-02 RX ORDER — ACETAMINOPHEN 325 MG/1
650 TABLET ORAL EVERY 6 HOURS PRN
Qty: 40 TABLET | Refills: 0 | Status: SHIPPED | OUTPATIENT
Start: 2025-07-02

## 2025-07-02 RX ORDER — IBUPROFEN 800 MG/1
800 TABLET, FILM COATED ORAL EVERY 8 HOURS PRN
Qty: 60 TABLET | Refills: 0 | Status: SHIPPED | OUTPATIENT
Start: 2025-07-02

## 2025-07-02 RX ORDER — OMEPRAZOLE 20 MG/1
20 CAPSULE, DELAYED RELEASE ORAL
Qty: 90 CAPSULE | Refills: 0 | OUTPATIENT
Start: 2025-07-02 | End: 2026-06-27

## 2025-07-02 RX ORDER — ASPIRIN 81 MG/1
81 TABLET ORAL DAILY
Qty: 90 TABLET | Refills: 0 | OUTPATIENT
Start: 2025-07-02

## 2025-07-03 VITALS
RESPIRATION RATE: 18 BRPM | SYSTOLIC BLOOD PRESSURE: 120 MMHG | OXYGEN SATURATION: 99 % | DIASTOLIC BLOOD PRESSURE: 70 MMHG | HEART RATE: 64 BPM | TEMPERATURE: 97.4 F

## 2025-07-04 ENCOUNTER — HOME CARE VISIT (OUTPATIENT)
Dept: HOME HEALTH SERVICES | Facility: HOME HEALTHCARE | Age: 65
End: 2025-07-04
Payer: MEDICARE

## 2025-07-04 VITALS
TEMPERATURE: 97.8 F | RESPIRATION RATE: 12 BRPM | SYSTOLIC BLOOD PRESSURE: 110 MMHG | OXYGEN SATURATION: 98 % | HEART RATE: 80 BPM | DIASTOLIC BLOOD PRESSURE: 80 MMHG

## 2025-07-04 PROCEDURE — G0300 HHS/HOSPICE OF LPN EA 15 MIN: HCPCS

## 2025-07-09 ENCOUNTER — HOME CARE VISIT (OUTPATIENT)
Dept: HOME HEALTH SERVICES | Facility: HOME HEALTHCARE | Age: 65
End: 2025-07-09
Payer: MEDICARE

## 2025-07-09 PROCEDURE — G0299 HHS/HOSPICE OF RN EA 15 MIN: HCPCS

## 2025-07-10 VITALS
SYSTOLIC BLOOD PRESSURE: 116 MMHG | RESPIRATION RATE: 19 BRPM | TEMPERATURE: 97.3 F | DIASTOLIC BLOOD PRESSURE: 78 MMHG | OXYGEN SATURATION: 97 % | HEART RATE: 68 BPM

## 2025-07-10 NOTE — ASSESSMENT & PLAN NOTE
Patient has not noticed much change in pain with lyrica 50mg   Increase to 100mg twice per day   Follow up as scheduled   Forms were completed a few days ago after our office received a fax regarding prescription for power chair     Orders:  •  pregabalin (LYRICA) 100 mg capsule; Take 1 capsule (100 mg total) by mouth 2 (two) times a day

## 2025-07-11 ENCOUNTER — HOME CARE VISIT (OUTPATIENT)
Dept: HOME HEALTH SERVICES | Facility: HOME HEALTHCARE | Age: 65
End: 2025-07-11
Payer: MEDICARE

## 2025-07-11 ENCOUNTER — TELEPHONE (OUTPATIENT)
Dept: WOUND CARE | Facility: CLINIC | Age: 65
End: 2025-07-11

## 2025-07-11 VITALS
DIASTOLIC BLOOD PRESSURE: 76 MMHG | SYSTOLIC BLOOD PRESSURE: 126 MMHG | RESPIRATION RATE: 12 BRPM | HEART RATE: 68 BPM | OXYGEN SATURATION: 99 % | TEMPERATURE: 97.5 F

## 2025-07-11 DIAGNOSIS — L89.223 STAGE III PRESSURE ULCER OF LEFT HIP (HCC): Primary | ICD-10-CM

## 2025-07-11 PROCEDURE — G0300 HHS/HOSPICE OF LPN EA 15 MIN: HCPCS

## 2025-07-11 NOTE — CASE COMMUNICATION
Ship to Pt. Home XX   Ordering MD - Dr. Yan   Wound 1 - Sacrum - Full XX - Frequency - daily   Cleansers   [place]ens [id number] ....1   4x4 N/S guaze   [id number]   2  Tape   Tape. Mefix 2inch [id number] ...3 boxes   Calcium Alginates   Alginate 4x4 [id number] ....6

## 2025-07-11 NOTE — TELEPHONE ENCOUNTER
Telephone call received from Ryanne regarding 2 new open areas.     Ryanne reports Rikki's wounds to his left hip have reopened.  Photos were uploaded by nurse.  Photos were reviewed by Kellen ACUNA.     Advised to place silver alginate to wound bed. Cover with Silicone bordered foam dressing. Change 3 x weekly & prn.    All of the above related back to nurse & patient, whom verbalized understanding.     Patient should follow up as scheduled 7/25/25.

## 2025-07-15 ENCOUNTER — OFFICE VISIT (OUTPATIENT)
Dept: FAMILY MEDICINE CLINIC | Facility: CLINIC | Age: 65
End: 2025-07-15
Payer: MEDICARE

## 2025-07-15 VITALS — HEART RATE: 77 BPM | DIASTOLIC BLOOD PRESSURE: 76 MMHG | SYSTOLIC BLOOD PRESSURE: 100 MMHG | OXYGEN SATURATION: 96 %

## 2025-07-15 DIAGNOSIS — E46 PROTEIN MALNUTRITION (HCC): ICD-10-CM

## 2025-07-15 DIAGNOSIS — G82.20 PARAPLEGIC SPINAL PARALYSIS (HCC): ICD-10-CM

## 2025-07-15 DIAGNOSIS — M86.68 OTHER CHRONIC OSTEOMYELITIS, OTHER SITE (HCC): ICD-10-CM

## 2025-07-15 DIAGNOSIS — E11.9 TYPE 2 DIABETES MELLITUS WITHOUT COMPLICATION, WITHOUT LONG-TERM CURRENT USE OF INSULIN (HCC): Primary | ICD-10-CM

## 2025-07-15 DIAGNOSIS — F11.20 CONTINUOUS OPIOID DEPENDENCE (HCC): ICD-10-CM

## 2025-07-15 DIAGNOSIS — L89.324 DECUBITUS ULCER OF ISCHIUM, LEFT, STAGE IV (HCC): ICD-10-CM

## 2025-07-15 DIAGNOSIS — D50.9 MICROCYTIC ANEMIA: ICD-10-CM

## 2025-07-15 PROCEDURE — 99214 OFFICE O/P EST MOD 30 MIN: CPT | Performed by: NURSE PRACTITIONER

## 2025-07-15 PROCEDURE — G2211 COMPLEX E/M VISIT ADD ON: HCPCS | Performed by: NURSE PRACTITIONER

## 2025-07-15 RX ORDER — FOLIC ACID/MULTIVIT,IRON,MINER 0.4MG-18MG
1 TABLET ORAL DAILY
Qty: 90 CAPSULE | Refills: 3 | Status: SHIPPED | OUTPATIENT
Start: 2025-07-15

## 2025-07-15 RX ORDER — PROTEIN SUPPLEMENT
2 POWDER (GRAM) ORAL 2 TIMES DAILY
Qty: 100 EACH | Refills: 5 | Status: SHIPPED | OUTPATIENT
Start: 2025-07-15

## 2025-07-15 RX ORDER — OXYCODONE HYDROCHLORIDE 15 MG/1
15 TABLET ORAL EVERY 6 HOURS PRN
Qty: 120 TABLET | Refills: 0 | Status: SHIPPED | OUTPATIENT
Start: 2025-07-31

## 2025-07-15 NOTE — ASSESSMENT & PLAN NOTE
New open areas noted on the left buttocks and hip. Being managed by wound care and [facility] has new orders

## 2025-07-15 NOTE — ASSESSMENT & PLAN NOTE
Patient remains on oxycodone due to his chronic medical issues  Pdmp reviewed rx sent ahead to be released 7/31 for 8/2 refill since provider will be out of office   Orders:  •  oxyCODONE (ROXICODONE) 15 mg immediate release tablet; Take 1 tablet (15 mg total) by mouth every 6 (six) hours as needed for severe pain Max Daily Amount: 60 mg Do not start before July 31, 2025.

## 2025-07-15 NOTE — ASSESSMENT & PLAN NOTE
Patient reports he feels lyrica is slowly helping his pain as well  Hoping to continue to titrate this to then start reducing oxycodone use   Recheck in 3 months

## 2025-07-15 NOTE — ASSESSMENT & PLAN NOTE
Patient prealbumin level low at 14.4   He has new wounds  Recommend starting prosource to help with wound healing  Also was previously on sena   May need to get this from [facility]   Recheck labs in 3 months   Orders:  •  Protein (Prosource) PACK; Take 2 each by mouth in the morning and 2 each before bedtime.  •  Prealbumin; Future  •  Omega-3 Fatty Acids (Omega-3 Fish Oil) 1200 MG CAPS; Take 1 capsule (1,200 mg total) by mouth in the morning

## 2025-07-15 NOTE — PROGRESS NOTES
Name: Rikki Arguello      : 1960      MRN: [id number]  Encounter Provider: KALPANA Verduzco  Encounter Date: 7/15/2025   Encounter department: [facility] PRIMARY [facility]  :  Assessment & Plan  Type 2 diabetes mellitus without complication, without long-term current use of insulin (HCC)  Patient A1C well controlled  Not on any medications  Follow up A1C ordered for the fall     Lab Results   Component Value Date    HGBA1C 5.3 2025     Orders:  •  Lipid Panel with Direct LDL reflex; Future  •  Comprehensive metabolic panel; Future  •  Hemoglobin A1C; Future    Paraplegic spinal paralysis (HCC)  Patient reports he feels lyrica is slowly helping his pain as well  Hoping to continue to titrate this to then start reducing oxycodone use   Recheck in 3 months        Microcytic anemia  Recent ferritin level is 72 and normal   Follow up lab ordered     Orders:  •  CBC and differential; Future     Protein malnutrition (HCC)  Patient prealbumin level low at 14.4   He has new wounds  Recommend starting prosource to help with wound healing  Also was previously on sena   May need to get this from [facility]   Recheck labs in 3 months   Orders:  •  Protein (Prosource) PACK; Take 2 each by mouth in the morning and 2 each before bedtime.  •  Prealbumin; Future  •  Omega-3 Fatty Acids (Omega-3 Fish Oil) 1200 MG CAPS; Take 1 capsule (1,200 mg total) by mouth in the morning    Decubitus ulcer of ischium, left, stage IV (HCC)  New open areas noted on the left buttocks and hip. Being managed by wound care and [facility] has new orders        Other chronic osteomyelitis, other site ([facility])  Patient being followed by [facility] and wound care   Suffered Right leg amputation     Orders:  •  oxyCODONE (ROXICODONE) 15 mg immediate release tablet; Take 1 tablet (15 mg total) by mouth every 6 (six) hours as needed for severe pain Max Daily Amount: 60 mg Do not start before 2025.    Continuous opioid dependence (HCC)  Patient  remains on oxycodone due to his chronic medical issues  Pdmp reviewed rx sent ahead to be released 7/31 for 8/2 refill since provider will be out of office   Orders:  •  oxyCODONE (ROXICODONE) 15 mg immediate release tablet; Take 1 tablet (15 mg total) by mouth every 6 (six) hours as needed for severe pain Max Daily Amount: 60 mg Do not start before July 31, 2025.           History of Present Illness     Patient has two new wound on his left hip. Reviewed and managed by wound care.   About two weeks ago patient lyrica was increased to 100mg BID to help with pain management.   Paraplegic spinal paralysis  Vitamin b12  Prealbumin 14.4     Patient report he was previously on sena         Review of Systems   Constitutional: Negative.    Respiratory:  Negative for chest tightness and shortness of breath.    Cardiovascular:  Negative for chest pain, palpitations and leg swelling.   Skin:  Positive for wound.       Objective   /76 (BP Location: Left arm, Patient Position: Sitting, Cuff Size: Standard)   Pulse 77   SpO2 96%      Physical Exam  Vitals and nursing note reviewed.   Constitutional:       Appearance: Normal appearance.      Comments: In wheelchair     Cardiovascular:      Rate and Rhythm: Regular rhythm.      Heart sounds: Normal heart sounds, S1 normal and S2 normal.   Pulmonary:      Breath sounds: Normal breath sounds.     Musculoskeletal:      Right lower leg: Right lower leg edema: absent right leg.      Left lower leg: Edema present.     Neurological:      Mental Status: He is alert and oriented to person, place, and time.     Psychiatric:         Mood and Affect: Mood normal.         Behavior: Behavior normal.         Thought Content: Thought content normal.         Judgment: Judgment normal.

## 2025-07-15 NOTE — ASSESSMENT & PLAN NOTE
Patient A1C well controlled  Not on any medications  Follow up A1C ordered for the fall     Lab Results   Component Value Date    HGBA1C 5.3 03/04/2025     Orders:  •  Lipid Panel with Direct LDL reflex; Future  •  Comprehensive metabolic panel; Future  •  Hemoglobin A1C; Future

## 2025-07-15 NOTE — ASSESSMENT & PLAN NOTE
Recent ferritin level is 72 and normal   Follow up lab ordered     Orders:  •  CBC and differential; Future    Not Available

## 2025-07-15 NOTE — ASSESSMENT & PLAN NOTE
Patient being followed by [facility] and wound care   Suffered Right leg amputation     Orders:  •  oxyCODONE (ROXICODONE) 15 mg immediate release tablet; Take 1 tablet (15 mg total) by mouth every 6 (six) hours as needed for severe pain Max Daily Amount: 60 mg Do not start before July 31, 2025.

## 2025-07-16 ENCOUNTER — HOME CARE VISIT (OUTPATIENT)
Dept: HOME HEALTH SERVICES | Facility: HOME HEALTHCARE | Age: 65
End: 2025-07-16
Payer: MEDICARE

## 2025-07-16 ENCOUNTER — DOCUMENTATION (OUTPATIENT)
Dept: WOUND CARE | Facility: CLINIC | Age: 65
End: 2025-07-16

## 2025-07-16 ENCOUNTER — TELEPHONE (OUTPATIENT)
Age: 65
End: 2025-07-16

## 2025-07-16 VITALS
OXYGEN SATURATION: 96 % | SYSTOLIC BLOOD PRESSURE: 92 MMHG | RESPIRATION RATE: 20 BRPM | HEART RATE: 76 BPM | TEMPERATURE: 97.6 F | DIASTOLIC BLOOD PRESSURE: 60 MMHG

## 2025-07-16 PROCEDURE — G0299 HHS/HOSPICE OF RN EA 15 MIN: HCPCS

## 2025-07-16 NOTE — PROGRESS NOTES
Call received from Ysabel at [facility] who called to report 2 new open areas on patients Left posterior and lateral hip/thigh area, presumable from tape. She reports she dressed them with the silver alginate today. Patient's next visit here on 7/25.

## 2025-07-16 NOTE — TELEPHONE ENCOUNTER
[facility] [employer] will be faxing over patient's order for nutrition supplement for Primary Care Provider to sign. Provided fax number [phone].

## 2025-07-17 ENCOUNTER — TELEPHONE (OUTPATIENT)
Dept: FAMILY MEDICINE CLINIC | Facility: CLINIC | Age: 65
End: 2025-07-17

## 2025-07-17 NOTE — CASE COMMUNICATION
Ship to Pt. Home XX       Wound 1 - Sacral wound - Full - xx Frequency - Daily   [employer]         ABD 5x9 [id number] .....40    [employer]   16fr 5cc cath [id number]....1     10cc cath tray [id number] ...1

## 2025-07-18 ENCOUNTER — HOME CARE VISIT (OUTPATIENT)
Dept: HOME HEALTH SERVICES | Facility: HOME HEALTHCARE | Age: 65
End: 2025-07-18
Payer: MEDICARE

## 2025-07-18 VITALS
DIASTOLIC BLOOD PRESSURE: 76 MMHG | RESPIRATION RATE: 18 BRPM | HEART RATE: 80 BPM | OXYGEN SATURATION: 98 % | TEMPERATURE: 96.7 F | SYSTOLIC BLOOD PRESSURE: 108 MMHG

## 2025-07-18 PROCEDURE — G0299 HHS/HOSPICE OF RN EA 15 MIN: HCPCS

## 2025-07-18 NOTE — TELEPHONE ENCOUNTER
Pharmacy sent request for the medication in this request.     Insurance is not covering fish oil- this is over the counter patient can get this without script instead

## 2025-07-23 ENCOUNTER — HOME CARE VISIT (OUTPATIENT)
Dept: HOME HEALTH SERVICES | Facility: HOME HEALTHCARE | Age: 65
End: 2025-07-23
Payer: MEDICARE

## 2025-07-23 PROCEDURE — G0299 HHS/HOSPICE OF RN EA 15 MIN: HCPCS

## 2025-07-24 ENCOUNTER — TELEPHONE (OUTPATIENT)
Age: 65
End: 2025-07-24

## 2025-07-24 NOTE — TELEPHONE ENCOUNTER
Patient called to inquire if office got the paperwork for the order for his transportation to next week's appointment. He needs that filled out and sent back so he can come to the office visit on 7/29 with Dr García.    Please advise and call back   #[phone]

## 2025-07-24 NOTE — TELEPHONE ENCOUNTER
Voicemail message left for the patient stating the office does not have the paperwork needed for next week's appointment.  Asked to call the office back.

## 2025-07-25 ENCOUNTER — HOME CARE VISIT (OUTPATIENT)
Dept: HOME HEALTH SERVICES | Facility: HOME HEALTHCARE | Age: 65
End: 2025-07-25
Payer: MEDICARE

## 2025-07-25 ENCOUNTER — OFFICE VISIT (OUTPATIENT)
Dept: WOUND CARE | Facility: CLINIC | Age: 65
End: 2025-07-25
Payer: MEDICARE

## 2025-07-25 VITALS
TEMPERATURE: 97.7 F | DIASTOLIC BLOOD PRESSURE: 70 MMHG | OXYGEN SATURATION: 98 % | SYSTOLIC BLOOD PRESSURE: 118 MMHG | HEART RATE: 98 BPM | RESPIRATION RATE: 16 BRPM

## 2025-07-25 VITALS
RESPIRATION RATE: 16 BRPM | SYSTOLIC BLOOD PRESSURE: 97 MMHG | DIASTOLIC BLOOD PRESSURE: 65 MMHG | TEMPERATURE: 97 F | HEART RATE: 71 BPM

## 2025-07-25 DIAGNOSIS — E11.9 TYPE 2 DIABETES MELLITUS WITHOUT COMPLICATION, WITHOUT LONG-TERM CURRENT USE OF INSULIN (HCC): ICD-10-CM

## 2025-07-25 DIAGNOSIS — G82.20 PARAPLEGIC SPINAL PARALYSIS (HCC): ICD-10-CM

## 2025-07-25 DIAGNOSIS — L89.154 STAGE IV PRESSURE ULCER OF SACRAL REGION (HCC): Primary | ICD-10-CM

## 2025-07-25 PROCEDURE — 11042 DBRDMT SUBQ TIS 1ST 20SQCM/<: CPT | Performed by: SURGERY

## 2025-07-25 PROCEDURE — 11045 DBRDMT SUBQ TISS EACH ADDL: CPT | Performed by: SURGERY

## 2025-07-28 ENCOUNTER — TELEPHONE (OUTPATIENT)
Dept: PAIN MEDICINE | Facility: CLINIC | Age: 65
End: 2025-07-28

## 2025-07-28 NOTE — TELEPHONE ENCOUNTER
Spoke twice with pt - due to his transportation needs and disability, transportation to [place] location has been unsucessful.  Discussed with pt other options, has decided to schedule [facility] closer to his [place] location

## 2025-07-28 NOTE — TELEPHONE ENCOUNTER
Patient called today to say that his insurance wont approve transport to [facility].     Can Lyft be arranged? Pt is scheduled with Dr García tomorrow 7/29 at 3 PM for a Cysto.     Please review.    Call back [phone]

## 2025-07-30 ENCOUNTER — HOME CARE VISIT (OUTPATIENT)
Dept: HOME HEALTH SERVICES | Facility: HOME HEALTHCARE | Age: 65
End: 2025-07-30
Payer: MEDICARE

## 2025-07-30 PROCEDURE — G0299 HHS/HOSPICE OF RN EA 15 MIN: HCPCS

## 2025-07-31 ENCOUNTER — HOME CARE VISIT (OUTPATIENT)
Dept: HOME HEALTH SERVICES | Facility: HOME HEALTHCARE | Age: 65
End: 2025-07-31
Payer: MEDICARE

## 2025-07-31 VITALS
OXYGEN SATURATION: 96 % | DIASTOLIC BLOOD PRESSURE: 70 MMHG | RESPIRATION RATE: 24 BRPM | SYSTOLIC BLOOD PRESSURE: 110 MMHG | HEART RATE: 82 BPM | TEMPERATURE: 97.9 F

## 2025-08-01 ENCOUNTER — HOME CARE VISIT (OUTPATIENT)
Dept: HOME HEALTH SERVICES | Facility: HOME HEALTHCARE | Age: 65
End: 2025-08-01
Payer: MEDICARE

## 2025-08-01 VITALS
DIASTOLIC BLOOD PRESSURE: 66 MMHG | RESPIRATION RATE: 18 BRPM | HEART RATE: 94 BPM | SYSTOLIC BLOOD PRESSURE: 98 MMHG | TEMPERATURE: 96.2 F | OXYGEN SATURATION: 95 %

## 2025-08-01 PROCEDURE — G0299 HHS/HOSPICE OF RN EA 15 MIN: HCPCS

## 2025-08-04 DIAGNOSIS — G89.29 CHRONIC LOW BACK PAIN WITHOUT SCIATICA, UNSPECIFIED BACK PAIN LATERALITY: ICD-10-CM

## 2025-08-04 DIAGNOSIS — M54.50 CHRONIC LOW BACK PAIN WITHOUT SCIATICA, UNSPECIFIED BACK PAIN LATERALITY: ICD-10-CM

## 2025-08-04 DIAGNOSIS — D50.9 MICROCYTIC ANEMIA: ICD-10-CM

## 2025-08-04 DIAGNOSIS — M86.68 OTHER CHRONIC OSTEOMYELITIS, OTHER SITE (HCC): ICD-10-CM

## 2025-08-04 DIAGNOSIS — K21.9 GASTROESOPHAGEAL REFLUX DISEASE WITHOUT ESOPHAGITIS: ICD-10-CM

## 2025-08-05 RX ORDER — ASPIRIN 81 MG/1
81 TABLET ORAL DAILY
Qty: 90 TABLET | Refills: 0 | Status: SHIPPED | OUTPATIENT
Start: 2025-08-05

## 2025-08-05 RX ORDER — IBUPROFEN 800 MG/1
800 TABLET, FILM COATED ORAL EVERY 8 HOURS PRN
Qty: 60 TABLET | Refills: 0 | Status: SHIPPED | OUTPATIENT
Start: 2025-08-05

## 2025-08-05 RX ORDER — ACETAMINOPHEN 325 MG/1
650 TABLET ORAL EVERY 6 HOURS PRN
Qty: 40 TABLET | Refills: 0 | Status: SHIPPED | OUTPATIENT
Start: 2025-08-05

## 2025-08-05 RX ORDER — OMEPRAZOLE 20 MG/1
20 CAPSULE, DELAYED RELEASE ORAL
Qty: 90 CAPSULE | Refills: 0 | Status: SHIPPED | OUTPATIENT
Start: 2025-08-05 | End: 2026-07-31

## 2025-08-06 ENCOUNTER — HOME CARE VISIT (OUTPATIENT)
Dept: HOME HEALTH SERVICES | Facility: HOME HEALTHCARE | Age: 65
End: 2025-08-06
Payer: MEDICARE

## 2025-08-06 RX ORDER — MULTIVIT-MIN/IRON/FOLIC ACID/K 18-600-40
1 CAPSULE ORAL DAILY
Qty: 90 CAPSULE | Refills: 0 | OUTPATIENT
Start: 2025-08-06

## 2025-08-07 ENCOUNTER — HOME CARE VISIT (OUTPATIENT)
Dept: HOME HEALTH SERVICES | Facility: HOME HEALTHCARE | Age: 65
End: 2025-08-07
Payer: MEDICARE

## 2025-08-07 RX ORDER — CALCIUM CARBONATE/VITAMIN D3 600 MG-10
250 TABLET ORAL DAILY
Qty: 90 TABLET | Refills: 3 | Status: SHIPPED | OUTPATIENT
Start: 2025-08-07

## 2025-08-08 ENCOUNTER — HOME CARE VISIT (OUTPATIENT)
Dept: HOME HEALTH SERVICES | Facility: HOME HEALTHCARE | Age: 65
End: 2025-08-08
Payer: MEDICARE

## 2025-08-13 ENCOUNTER — HOME CARE VISIT (OUTPATIENT)
Dept: HOME HEALTH SERVICES | Facility: HOME HEALTHCARE | Age: 65
End: 2025-08-13
Payer: MEDICARE

## 2025-08-15 ENCOUNTER — HOME CARE VISIT (OUTPATIENT)
Dept: HOME HEALTH SERVICES | Facility: HOME HEALTHCARE | Age: 65
End: 2025-08-15
Payer: MEDICARE

## 2025-08-15 PROCEDURE — G0299 HHS/HOSPICE OF RN EA 15 MIN: HCPCS

## 2025-08-18 VITALS
HEART RATE: 57 BPM | OXYGEN SATURATION: 99 % | TEMPERATURE: 97.7 F | DIASTOLIC BLOOD PRESSURE: 72 MMHG | RESPIRATION RATE: 18 BRPM | SYSTOLIC BLOOD PRESSURE: 140 MMHG

## 2025-08-19 DIAGNOSIS — K21.9 GASTROESOPHAGEAL REFLUX DISEASE: ICD-10-CM

## 2025-08-19 RX ORDER — OMEPRAZOLE 40 MG/1
40 CAPSULE, DELAYED RELEASE ORAL DAILY
Qty: 30 CAPSULE | Refills: 5 | OUTPATIENT
Start: 2025-08-19

## 2025-08-22 ENCOUNTER — OFFICE VISIT (OUTPATIENT)
Dept: WOUND CARE | Facility: CLINIC | Age: 65
End: 2025-08-22
Payer: MEDICARE

## 2025-08-22 ENCOUNTER — HOME CARE VISIT (OUTPATIENT)
Dept: HOME HEALTH SERVICES | Facility: HOME HEALTHCARE | Age: 65
End: 2025-08-22
Payer: MEDICARE

## 2025-08-22 VITALS
RESPIRATION RATE: 14 BRPM | SYSTOLIC BLOOD PRESSURE: 121 MMHG | TEMPERATURE: 97 F | DIASTOLIC BLOOD PRESSURE: 73 MMHG | HEART RATE: 73 BPM

## 2025-08-22 DIAGNOSIS — E11.9 TYPE 2 DIABETES MELLITUS WITHOUT COMPLICATION, WITHOUT LONG-TERM CURRENT USE OF INSULIN (HCC): ICD-10-CM

## 2025-08-22 DIAGNOSIS — L89.154 STAGE IV PRESSURE ULCER OF SACRAL REGION (HCC): Primary | ICD-10-CM

## 2025-08-22 DIAGNOSIS — G82.20 PARAPLEGIC SPINAL PARALYSIS (HCC): ICD-10-CM

## 2025-08-22 PROCEDURE — 11045 DBRDMT SUBQ TISS EACH ADDL: CPT | Performed by: SURGERY

## 2025-08-22 PROCEDURE — 11042 DBRDMT SUBQ TIS 1ST 20SQCM/<: CPT | Performed by: SURGERY

## 2025-08-22 RX ORDER — SODIUM HYPOCHLORITE 2.5 MG/ML
1 SOLUTION TOPICAL ONCE
Qty: 473 ML | Refills: 0 | Status: SHIPPED | OUTPATIENT
Start: 2025-08-22 | End: 2025-08-22

## (undated) PROCEDURE — 0WB80ZZ EXCISION OF CHEST WALL, OPEN APPROACH: ICD-10-PCS | Performed by: THORACIC SURGERY (CARDIOTHORACIC VASCULAR SURGERY)

## (undated) DEVICE — VAC DRESSING SENSATRAC LRG

## (undated) DEVICE — HYDROPHILIC WOUND DRESSING WITH ZINC PLUS VITAMINS A AND B6.: Brand: DERMAGRAN®-B

## (undated) DEVICE — JACKSON-PRATT 100CC BULB RESERVOIR: Brand: CARDINAL HEALTH

## (undated) DEVICE — SCD SEQUENTIAL COMPRESSION COMFORT SLEEVE MEDIUM KNEE LENGTH: Brand: KENDALL SCD

## (undated) DEVICE — REM POLYHESIVE ADULT PATIENT RETURN ELECTRODE: Brand: VALLEYLAB

## (undated) DEVICE — V.A.C. DRAPE: Brand: V.A.C.®

## (undated) DEVICE — SUT PDS II 2-0 SH 27 IN Z317H

## (undated) DEVICE — VIOLET BRAIDED (POLYGLACTIN 910), SYNTHETIC ABSORBABLE SUTURE: Brand: COATED VICRYL

## (undated) DEVICE — GLOVE INDICATOR PI UNDERGLOVE SZ 8 BLUE

## (undated) DEVICE — GAUZE SPONGES,16 PLY: Brand: CURITY

## (undated) DEVICE — ANTIBACTERIAL UNDYED BRAIDED (POLYGLACTIN 910), SYNTHETIC ABSORBABLE SUTURE: Brand: COATED VICRYL

## (undated) DEVICE — 3000CC GUARDIAN II: Brand: GUARDIAN

## (undated) DEVICE — STERILE MUSCLE FLAP PACK: Brand: CARDINAL HEALTH

## (undated) DEVICE — TRAY FOLEY 16FR URIMETER SURESTEP

## (undated) DEVICE — GLOVE SRG BIOGEL 8

## (undated) DEVICE — SUT MONOCRYL 4-0 PS-2 27 IN Y426H

## (undated) DEVICE — WOUND RETRACTOR AND PROTECTOR: Brand: ALEXIS WOUND PROTECTOR-RETRACTOR

## (undated) DEVICE — SUT SILK 2-0 SH 30 IN K833H

## (undated) DEVICE — ULTRACLEAN ACCESSORY ELECTRODE, 1 INCH COATED NEEDLE WITH EXTENDED INSULATION: Brand: ULTRACLEAN

## (undated) DEVICE — SUT VICRYL 2-0 SH 27 IN UNDYED J417H

## (undated) DEVICE — CHLORAPREP HI-LITE 26ML ORANGE

## (undated) DEVICE — INTENDED FOR TISSUE SEPARATION, AND OTHER PROCEDURES THAT REQUIRE A SHARP SURGICAL BLADE TO PUNCTURE OR CUT.: Brand: BARD-PARKER SAFETY BLADES SIZE 10, STERILE

## (undated) DEVICE — CULTURE TUBE AEROBIC

## (undated) DEVICE — ELECTRODE LAP L WIRE E-Z CLEAN 33CM -0100

## (undated) DEVICE — ELECTRODE BLADE MOD E-Z CLEAN  2.75IN 7CM -0012AM

## (undated) DEVICE — GLOVE SRG BIOGEL ECLIPSE 7.5

## (undated) DEVICE — HEMOCLIP CARTRIDGE MED

## (undated) DEVICE — PLASMABLADE X PS210-030S-LIGHT 3.0SL: Brand: PLASMABLADE™ X

## (undated) DEVICE — TOWEL SURG XR DETECT GREEN STRL RFD

## (undated) DEVICE — PEANUT 5 PK

## (undated) DEVICE — SUT PDS II 1 CTX 36 IN Z371T

## (undated) DEVICE — INSTRUMENT POUCH: Brand: CONVERTORS

## (undated) DEVICE — 3M™ TEGADERM™ TRANSPARENT FILM DRESSING FRAME STYLE, 1624W, 2-3/8 IN X 2-3/4 IN (6 CM X 7 CM), 100/CT 4CT/CASE: Brand: 3M™ TEGADERM™

## (undated) DEVICE — INTENDED FOR TISSUE SEPARATION, AND OTHER PROCEDURES THAT REQUIRE A SHARP SURGICAL BLADE TO PUNCTURE OR CUT.: Brand: BARD-PARKER SAFETY BLADES SIZE 15, STERILE

## (undated) DEVICE — SUT MONOCRYL 3-0 SH 27 IN Y416H

## (undated) DEVICE — CULTURE TUBE ANAEROBIC

## (undated) DEVICE — PAD GROUNDING ADULT

## (undated) DEVICE — TUBING SUCTION 5MM X 12 FT

## (undated) DEVICE — SUT CHROMIC 3-0 SH 27 IN G122H

## (undated) DEVICE — SUT MONOCRYL 4-0 PS-2 18 IN Y496G

## (undated) DEVICE — THE SIMPULSE SOLO SYSTEM WITH ULTREX RETRACTABLE SPLASH SHIELD TIP: Brand: SIMPULSE SOLO

## (undated) DEVICE — MAYO STAND COVER: Brand: CONVERTORS

## (undated) DEVICE — CURITY NON-ADHERENT STRIPS: Brand: CURITY

## (undated) DEVICE — SPONGE STICK WITH PVP-I: Brand: KENDALL

## (undated) DEVICE — RETRACTOR RING 14.1 X 14.1 CM DISP

## (undated) DEVICE — CATH FOLEY 16FR 5ML 2WAY LUBRICATH

## (undated) DEVICE — PLUMEPEN PRO 10FT

## (undated) DEVICE — INSUFFLATION TUBING PRIMFLO

## (undated) DEVICE — DRESSING BIOPATCH ANTIMICROBIOL 3/4 IN DISC

## (undated) DEVICE — PICO 7 SINGLE 15X30CM: Brand: PICO™ 7

## (undated) DEVICE — TROCAR: Brand: KII FIOS FIRST ENTRY

## (undated) DEVICE — SUT ETHILON 3-0 PS-1 18 IN 1663G

## (undated) DEVICE — STAYS ELASTIC 5MM SHARP HOOK LONE STAR

## (undated) DEVICE — SUT MONOCRYL 2-0 SH 27 IN Y417H

## (undated) DEVICE — VAC CANISTER 500ML

## (undated) DEVICE — ELECTRODE BLADE MOD  E-Z CLEAN 6.5IN -0014M

## (undated) DEVICE — CYSTO TUBING SINGLE IRRIGATION

## (undated) DEVICE — DRAPE EQUIPMENT RF WAND

## (undated) DEVICE — SCROTAL SUPPORT XL

## (undated) DEVICE — SUT CHROMIC 3-0 PS-2 27 1638H

## (undated) DEVICE — SUT VICRYL 2-0 REEL 54 IN J286G

## (undated) DEVICE — PENCIL ELECTROSURG E-Z CLEAN -0035H

## (undated) DEVICE — MEDI-VAC YANK SUCT HNDL W/TPRD BULBOUS TIP: Brand: CARDINAL HEALTH

## (undated) DEVICE — STERILE THORACIC PACK: Brand: CARDINAL HEALTH

## (undated) DEVICE — ENDOPATH 5MM ENDOSCOPIC BLUNT TIP DISSECTORS (12 POUCHES CONTAINING 3 DISSECTORS EACH): Brand: ENDOPATH

## (undated) DEVICE — NEEDLE 25G X 1 1/2

## (undated) DEVICE — MICRODISSECTION NEEDLE: Brand: COLORADO

## (undated) DEVICE — BAG DECANTER

## (undated) DEVICE — JP CHAN DRN SIL HUBLESS 15FR W/TRO: Brand: CARDINAL HEALTH

## (undated) DEVICE — STRETCH BANDAGE: Brand: CURITY

## (undated) DEVICE — HOOK ELASTIC STAY 12MM BLUNT SNGL STRL

## (undated) DEVICE — LIGACLIP MCA MULTIPLE CLIP APPLIERS, 20 MEDIUM CLIPS: Brand: LIGACLIP

## (undated) DEVICE — PROXIMATE SKIN STAPLERS (35 WIDE) CONTAINS 35 STAINLESS STEEL STAPLES (FIXED HEAD): Brand: PROXIMATE

## (undated) DEVICE — 3M™ IOBAN™ 2 ANTIMICROBIAL INCISE DRAPE 6650EZ: Brand: IOBAN™ 2

## (undated) DEVICE — CARDINAL HEALTH LAP SPONGE  8 X 36 IN. PREWASHED X-RAY DETECTABLE SOFTPACK: Brand: CARDINAL HEALTH

## (undated) DEVICE — ABDOMINAL PAD: Brand: DERMACEA

## (undated) DEVICE — SUT VICRYL 0 CT-1 27 IN J260H

## (undated) DEVICE — TISSEEL FIBRIN 10 ML FROZEN

## (undated) DEVICE — SUT ETHILON 3-0 FSLX 30 IN 1673H

## (undated) DEVICE — HOOK ELASTIC STAY 12MM BLUNT DUAL LEAD

## (undated) DEVICE — ADHESIVE SKIN HIGH VISCOSITY EXOFIN 1ML

## (undated) DEVICE — SUT VICRYL 3-0 SH 27 IN J416H

## (undated) DEVICE — BETHLEHEM UNIVERSAL MINOR GEN: Brand: CARDINAL HEALTH

## (undated) DEVICE — SUT ETHILON 2-0 FSLX 30 IN 1674H

## (undated) DEVICE — DRESSING XEROFORM 5 X 9

## (undated) DEVICE — MEDI-VAC YANKAUER SUCTION HANDLE W/BULBOUS AND CONTROL VENT: Brand: CARDINAL HEALTH

## (undated) DEVICE — TOOL 26BA40 LEGEND 26CM 4MM BALL: Brand: MIDAS REX®

## (undated) DEVICE — GLOVE SRG BIOGEL 6.5